# Patient Record
Sex: MALE | Race: WHITE | NOT HISPANIC OR LATINO | Employment: OTHER | URBAN - NONMETROPOLITAN AREA
[De-identification: names, ages, dates, MRNs, and addresses within clinical notes are randomized per-mention and may not be internally consistent; named-entity substitution may affect disease eponyms.]

---

## 2017-06-16 DIAGNOSIS — I10 ESSENTIAL HYPERTENSION: ICD-10-CM

## 2017-06-16 RX ORDER — METOPROLOL TARTRATE 50 MG/1
TABLET, FILM COATED ORAL
Qty: 180 TABLET | Refills: 0 | OUTPATIENT
Start: 2017-06-16

## 2017-06-17 DIAGNOSIS — I10 ESSENTIAL HYPERTENSION: ICD-10-CM

## 2017-06-19 RX ORDER — METOPROLOL TARTRATE 50 MG/1
TABLET, FILM COATED ORAL
Qty: 180 TABLET | Refills: 0 | OUTPATIENT
Start: 2017-06-19

## 2017-07-03 ENCOUNTER — APPOINTMENT (OUTPATIENT)
Dept: MRI IMAGING | Facility: HOSPITAL | Age: 52
End: 2017-07-03

## 2017-07-03 ENCOUNTER — HOSPITAL ENCOUNTER (INPATIENT)
Facility: HOSPITAL | Age: 52
LOS: 16 days | Discharge: HOME | End: 2017-07-19
Attending: EMERGENCY MEDICINE | Admitting: INTERNAL MEDICINE

## 2017-07-03 ENCOUNTER — APPOINTMENT (OUTPATIENT)
Dept: CT IMAGING | Facility: HOSPITAL | Age: 52
End: 2017-07-03

## 2017-07-03 ENCOUNTER — APPOINTMENT (OUTPATIENT)
Dept: GENERAL RADIOLOGY | Facility: HOSPITAL | Age: 52
End: 2017-07-03

## 2017-07-03 DIAGNOSIS — T78.3XXS ANGIOEDEMA, SEQUELA: ICD-10-CM

## 2017-07-03 DIAGNOSIS — R49.1 VOICE ABSENCE: ICD-10-CM

## 2017-07-03 DIAGNOSIS — T56.0X1D LEAD-INDUCED CHRONIC GOUT OF FOOT WITHOUT TOPHUS, UNSPECIFIED LATERALITY, SUBSEQUENT ENCOUNTER: ICD-10-CM

## 2017-07-03 DIAGNOSIS — R56.9 SEIZURES (HCC): ICD-10-CM

## 2017-07-03 DIAGNOSIS — R13.12 OROPHARYNGEAL DYSPHAGIA: Primary | ICD-10-CM

## 2017-07-03 DIAGNOSIS — F10.931 ALCOHOL WITHDRAWAL, WITH DELIRIUM (HCC): ICD-10-CM

## 2017-07-03 DIAGNOSIS — Z74.09 IMPAIRED MOBILITY: ICD-10-CM

## 2017-07-03 DIAGNOSIS — M1A.1790 LEAD-INDUCED CHRONIC GOUT OF FOOT WITHOUT TOPHUS, UNSPECIFIED LATERALITY, SUBSEQUENT ENCOUNTER: ICD-10-CM

## 2017-07-03 PROBLEM — T78.3XXA ANGIO-EDEMA: Status: ACTIVE | Noted: 2017-07-03

## 2017-07-03 LAB
ALBUMIN SERPL-MCNC: 4 G/DL (ref 3.5–5)
ALBUMIN/GLOB SERPL: 1.4 G/DL (ref 1.1–2.5)
ALP SERPL-CCNC: 69 U/L (ref 24–120)
ALT SERPL W P-5'-P-CCNC: 39 U/L (ref 0–54)
AMPHET+METHAMPHET UR QL: NEGATIVE
ANION GAP SERPL CALCULATED.3IONS-SCNC: 10 MMOL/L (ref 4–13)
AST SERPL-CCNC: 72 U/L (ref 7–45)
BACTERIA UR QL AUTO: ABNORMAL /HPF
BARBITURATES UR QL SCN: NEGATIVE
BASOPHILS # BLD AUTO: 0.02 10*3/MM3 (ref 0–0.2)
BASOPHILS NFR BLD AUTO: 0.2 % (ref 0–2)
BENZODIAZ UR QL SCN: NEGATIVE
BILIRUB SERPL-MCNC: 0.6 MG/DL (ref 0.1–1)
BILIRUB UR QL STRIP: NEGATIVE
BUN BLD-MCNC: 9 MG/DL (ref 5–21)
BUN/CREAT SERPL: 7.6 (ref 7–25)
CALCIUM SPEC-SCNC: 8.8 MG/DL (ref 8.4–10.4)
CANNABINOIDS SERPL QL: NEGATIVE
CHLORIDE SERPL-SCNC: 102 MMOL/L (ref 98–110)
CLARITY UR: CLEAR
CO2 SERPL-SCNC: 29 MMOL/L (ref 24–31)
COCAINE UR QL: NEGATIVE
COLOR UR: YELLOW
CREAT BLD-MCNC: 1.19 MG/DL (ref 0.5–1.4)
DEPRECATED RDW RBC AUTO: 44 FL (ref 40–54)
EOSINOPHIL # BLD AUTO: 0.03 10*3/MM3 (ref 0–0.7)
EOSINOPHIL NFR BLD AUTO: 0.3 % (ref 0–4)
ERYTHROCYTE [DISTWIDTH] IN BLOOD BY AUTOMATED COUNT: 14 % (ref 12–15)
GFR SERPL CREATININE-BSD FRML MDRD: 64 ML/MIN/1.73
GLOBULIN UR ELPH-MCNC: 2.9 GM/DL
GLUCOSE BLD-MCNC: 127 MG/DL (ref 70–100)
GLUCOSE UR STRIP-MCNC: NEGATIVE MG/DL
HCT VFR BLD AUTO: 34.1 % (ref 40–52)
HGB BLD-MCNC: 11.9 G/DL (ref 14–18)
HGB UR QL STRIP.AUTO: ABNORMAL
HYALINE CASTS UR QL AUTO: ABNORMAL /LPF
IMM GRANULOCYTES # BLD: 0.04 10*3/MM3 (ref 0–0.03)
IMM GRANULOCYTES NFR BLD: 0.3 % (ref 0–5)
KETONES UR QL STRIP: NEGATIVE
LEUKOCYTE ESTERASE UR QL STRIP.AUTO: NEGATIVE
LYMPHOCYTES # BLD AUTO: 0.99 10*3/MM3 (ref 0.72–4.86)
LYMPHOCYTES NFR BLD AUTO: 8.3 % (ref 15–45)
MCH RBC QN AUTO: 30.3 PG (ref 28–32)
MCHC RBC AUTO-ENTMCNC: 34.9 G/DL (ref 33–36)
MCV RBC AUTO: 86.8 FL (ref 82–95)
METHADONE UR QL SCN: NEGATIVE
MONOCYTES # BLD AUTO: 1.06 10*3/MM3 (ref 0.19–1.3)
MONOCYTES NFR BLD AUTO: 8.9 % (ref 4–12)
NEUTROPHILS # BLD AUTO: 9.81 10*3/MM3 (ref 1.87–8.4)
NEUTROPHILS NFR BLD AUTO: 82 % (ref 39–78)
NITRITE UR QL STRIP: NEGATIVE
OPIATES UR QL: NEGATIVE
PCP UR QL SCN: NEGATIVE
PH UR STRIP.AUTO: 7.5 [PH] (ref 5–8)
PLATELET # BLD AUTO: 245 10*3/MM3 (ref 130–400)
PMV BLD AUTO: 9.6 FL (ref 6–12)
POTASSIUM BLD-SCNC: 3 MMOL/L (ref 3.5–5.3)
PROT SERPL-MCNC: 6.9 G/DL (ref 6.3–8.7)
PROT UR QL STRIP: ABNORMAL
RBC # BLD AUTO: 3.93 10*6/MM3 (ref 4.8–5.9)
RBC # UR: ABNORMAL /HPF
REF LAB TEST METHOD: ABNORMAL
SODIUM BLD-SCNC: 141 MMOL/L (ref 135–145)
SP GR UR STRIP: 1.01 (ref 1–1.03)
SQUAMOUS #/AREA URNS HPF: ABNORMAL /HPF
UROBILINOGEN UR QL STRIP: ABNORMAL
WBC NRBC COR # BLD: 11.95 10*3/MM3 (ref 4.8–10.8)
WBC UR QL AUTO: ABNORMAL /HPF

## 2017-07-03 PROCEDURE — 80307 DRUG TEST PRSMV CHEM ANLYZR: CPT | Performed by: EMERGENCY MEDICINE

## 2017-07-03 PROCEDURE — 81001 URINALYSIS AUTO W/SCOPE: CPT | Performed by: EMERGENCY MEDICINE

## 2017-07-03 PROCEDURE — G8996 SWALLOW CURRENT STATUS: HCPCS

## 2017-07-03 PROCEDURE — G8997 SWALLOW GOAL STATUS: HCPCS

## 2017-07-03 PROCEDURE — 85025 COMPLETE CBC W/AUTO DIFF WBC: CPT | Performed by: EMERGENCY MEDICINE

## 2017-07-03 PROCEDURE — G0378 HOSPITAL OBSERVATION PER HR: HCPCS

## 2017-07-03 PROCEDURE — 99219 PR INITIAL OBSERVATION CARE/DAY 50 MINUTES: CPT | Performed by: PSYCHIATRY & NEUROLOGY

## 2017-07-03 PROCEDURE — 73030 X-RAY EXAM OF SHOULDER: CPT

## 2017-07-03 PROCEDURE — 70553 MRI BRAIN STEM W/O & W/DYE: CPT

## 2017-07-03 PROCEDURE — 99253 IP/OBS CNSLTJ NEW/EST LOW 45: CPT | Performed by: PHYSICIAN ASSISTANT

## 2017-07-03 PROCEDURE — 94799 UNLISTED PULMONARY SVC/PX: CPT

## 2017-07-03 PROCEDURE — A9577 INJ MULTIHANCE: HCPCS | Performed by: EMERGENCY MEDICINE

## 2017-07-03 PROCEDURE — 25010000002 DEXAMETHASONE PER 1 MG: Performed by: EMERGENCY MEDICINE

## 2017-07-03 PROCEDURE — 93010 ELECTROCARDIOGRAM REPORT: CPT | Performed by: INTERNAL MEDICINE

## 2017-07-03 PROCEDURE — 0 GADOBENATE DIMEGLUMINE 529 MG/ML SOLUTION: Performed by: EMERGENCY MEDICINE

## 2017-07-03 PROCEDURE — 93005 ELECTROCARDIOGRAM TRACING: CPT | Performed by: EMERGENCY MEDICINE

## 2017-07-03 PROCEDURE — 25010000003 LEVETIRACETAM IN NACL 0.75% 1000 MG/100ML SOLUTION: Performed by: PSYCHIATRY & NEUROLOGY

## 2017-07-03 PROCEDURE — 92610 EVALUATE SWALLOWING FUNCTION: CPT

## 2017-07-03 PROCEDURE — 99284 EMERGENCY DEPT VISIT MOD MDM: CPT

## 2017-07-03 PROCEDURE — 25010000002 DIPHENHYDRAMINE PER 50 MG: Performed by: EMERGENCY MEDICINE

## 2017-07-03 PROCEDURE — 80053 COMPREHEN METABOLIC PANEL: CPT | Performed by: EMERGENCY MEDICINE

## 2017-07-03 PROCEDURE — 70450 CT HEAD/BRAIN W/O DYE: CPT

## 2017-07-03 RX ORDER — AMLODIPINE BESYLATE 10 MG/1
10 TABLET ORAL
Status: DISCONTINUED | OUTPATIENT
Start: 2017-07-03 | End: 2017-07-03

## 2017-07-03 RX ORDER — FAMOTIDINE 10 MG/ML
20 INJECTION, SOLUTION INTRAVENOUS EVERY 12 HOURS SCHEDULED
Status: DISCONTINUED | OUTPATIENT
Start: 2017-07-03 | End: 2017-07-10 | Stop reason: CLARIF

## 2017-07-03 RX ORDER — HYDROCODONE BITARTRATE AND ACETAMINOPHEN 5; 325 MG/1; MG/1
1 TABLET ORAL EVERY 4 HOURS PRN
Status: DISCONTINUED | OUTPATIENT
Start: 2017-07-03 | End: 2017-07-19 | Stop reason: HOSPADM

## 2017-07-03 RX ORDER — LEVETIRACETAM 10 MG/ML
1000 INJECTION INTRAVASCULAR ONCE
Status: COMPLETED | OUTPATIENT
Start: 2017-07-03 | End: 2017-07-03

## 2017-07-03 RX ORDER — HYDROCODONE BITARTRATE AND ACETAMINOPHEN 10; 325 MG/1; MG/1
1 TABLET ORAL EVERY 4 HOURS PRN
Status: DISCONTINUED | OUTPATIENT
Start: 2017-07-03 | End: 2017-07-19 | Stop reason: HOSPADM

## 2017-07-03 RX ORDER — DEXAMETHASONE SODIUM PHOSPHATE 10 MG/ML
10 INJECTION INTRAMUSCULAR; INTRAVENOUS ONCE
Status: COMPLETED | OUTPATIENT
Start: 2017-07-03 | End: 2017-07-03

## 2017-07-03 RX ORDER — NALOXONE HCL 0.4 MG/ML
0.4 VIAL (ML) INJECTION
Status: DISCONTINUED | OUTPATIENT
Start: 2017-07-03 | End: 2017-07-05

## 2017-07-03 RX ORDER — IBUPROFEN 800 MG/1
800 TABLET ORAL EVERY 6 HOURS PRN
COMMUNITY
End: 2017-07-19 | Stop reason: HOSPADM

## 2017-07-03 RX ORDER — LORAZEPAM 2 MG/ML
2 INJECTION INTRAMUSCULAR
Status: DISCONTINUED | OUTPATIENT
Start: 2017-07-03 | End: 2017-07-04

## 2017-07-03 RX ORDER — POTASSIUM CHLORIDE 20MEQ/15ML
40 LIQUID (ML) ORAL ONCE
Status: COMPLETED | OUTPATIENT
Start: 2017-07-03 | End: 2017-07-03

## 2017-07-03 RX ORDER — FAMOTIDINE 10 MG/ML
20 INJECTION, SOLUTION INTRAVENOUS ONCE
Status: COMPLETED | OUTPATIENT
Start: 2017-07-03 | End: 2017-07-03

## 2017-07-03 RX ORDER — CLONIDINE HYDROCHLORIDE 0.1 MG/1
0.2 TABLET ORAL ONCE
Status: COMPLETED | OUTPATIENT
Start: 2017-07-03 | End: 2017-07-03

## 2017-07-03 RX ORDER — DIPHENHYDRAMINE HYDROCHLORIDE 50 MG/ML
50 INJECTION INTRAMUSCULAR; INTRAVENOUS ONCE
Status: COMPLETED | OUTPATIENT
Start: 2017-07-03 | End: 2017-07-03

## 2017-07-03 RX ORDER — SODIUM CHLORIDE 0.9 % (FLUSH) 0.9 %
1-10 SYRINGE (ML) INJECTION AS NEEDED
Status: DISCONTINUED | OUTPATIENT
Start: 2017-07-03 | End: 2017-07-19 | Stop reason: HOSPADM

## 2017-07-03 RX ORDER — NICOTINE 21 MG/24HR
1 PATCH, TRANSDERMAL 24 HOURS TRANSDERMAL EVERY 24 HOURS
Status: DISCONTINUED | OUTPATIENT
Start: 2017-07-03 | End: 2017-07-06

## 2017-07-03 RX ORDER — METOPROLOL TARTRATE 50 MG/1
50 TABLET, FILM COATED ORAL EVERY 12 HOURS
Status: DISCONTINUED | OUTPATIENT
Start: 2017-07-03 | End: 2017-07-03

## 2017-07-03 RX ORDER — METOPROLOL TARTRATE 50 MG/1
50 TABLET, FILM COATED ORAL EVERY 12 HOURS
COMMUNITY
End: 2017-07-19 | Stop reason: HOSPADM

## 2017-07-03 RX ORDER — LEVETIRACETAM 5 MG/ML
500 INJECTION INTRAVASCULAR EVERY 12 HOURS SCHEDULED
Status: DISCONTINUED | OUTPATIENT
Start: 2017-07-04 | End: 2017-07-07

## 2017-07-03 RX ORDER — ONDANSETRON 2 MG/ML
4 INJECTION INTRAMUSCULAR; INTRAVENOUS EVERY 6 HOURS PRN
Status: DISCONTINUED | OUTPATIENT
Start: 2017-07-03 | End: 2017-07-19 | Stop reason: HOSPADM

## 2017-07-03 RX ORDER — ACETAMINOPHEN 325 MG/1
650 TABLET ORAL EVERY 4 HOURS PRN
Status: DISCONTINUED | OUTPATIENT
Start: 2017-07-03 | End: 2017-07-19 | Stop reason: HOSPADM

## 2017-07-03 RX ORDER — DEXAMETHASONE SODIUM PHOSPHATE 4 MG/ML
4 INJECTION, SOLUTION INTRA-ARTICULAR; INTRALESIONAL; INTRAMUSCULAR; INTRAVENOUS; SOFT TISSUE EVERY 6 HOURS
Status: DISCONTINUED | OUTPATIENT
Start: 2017-07-03 | End: 2017-07-03

## 2017-07-03 RX ORDER — CLONIDINE HYDROCHLORIDE 0.1 MG/1
0.1 TABLET ORAL NIGHTLY
Status: DISCONTINUED | OUTPATIENT
Start: 2017-07-03 | End: 2017-07-03

## 2017-07-03 RX ADMIN — DIPHENHYDRAMINE HYDROCHLORIDE 50 MG: 50 INJECTION, SOLUTION INTRAMUSCULAR; INTRAVENOUS at 08:09

## 2017-07-03 RX ADMIN — CLONIDINE HYDROCHLORIDE 0.2 MG: 0.1 TABLET ORAL at 08:32

## 2017-07-03 RX ADMIN — POTASSIUM CHLORIDE 40 MEQ: 20 SOLUTION ORAL at 17:59

## 2017-07-03 RX ADMIN — NICARDIPINE HYDROCHLORIDE 5 MG/HR: 25 INJECTION INTRAVENOUS at 16:02

## 2017-07-03 RX ADMIN — FAMOTIDINE 20 MG: 10 INJECTION INTRAVENOUS at 22:11

## 2017-07-03 RX ADMIN — GADOBENATE DIMEGLUMINE 19 ML: 529 INJECTION, SOLUTION INTRAVENOUS at 12:19

## 2017-07-03 RX ADMIN — DEXAMETHASONE SODIUM PHOSPHATE 10 MG: 10 INJECTION, SOLUTION INTRAMUSCULAR; INTRAVENOUS at 08:09

## 2017-07-03 RX ADMIN — FAMOTIDINE 20 MG: 10 INJECTION, SOLUTION INTRAVENOUS at 08:09

## 2017-07-03 RX ADMIN — LEVETIRACETAM 1000 MG: 1000 INJECTION, SOLUTION INTRAVENOUS at 19:06

## 2017-07-03 RX ADMIN — NICOTINE 1 PATCH: 14 PATCH, EXTENDED RELEASE TRANSDERMAL at 17:58

## 2017-07-03 RX ADMIN — DEXAMETHASONE SODIUM PHOSPHATE 10 MG: 10 INJECTION, SOLUTION INTRAMUSCULAR; INTRAVENOUS at 14:06

## 2017-07-03 RX ADMIN — ACETAMINOPHEN 650 MG: 325 TABLET, FILM COATED ORAL at 20:10

## 2017-07-03 RX ADMIN — HYDROCODONE BITARTRATE AND ACETAMINOPHEN 1 TABLET: 10; 325 TABLET ORAL at 17:58

## 2017-07-03 RX ADMIN — NICARDIPINE HYDROCHLORIDE 7.5 MG/HR: 25 INJECTION INTRAVENOUS at 20:00

## 2017-07-03 NOTE — CONSULTS
Patient Care Team:  Linda Hoffman DNP, EPI as PCP - General  Linda Hoffman DNP, APRN as PCP - Family Medicine    Chief Complaint   Patient presents with   • Angioedema   • Seizures        Subjective     Nishant Savage is a 52 y.o. male who presents for evaluation.  History of Present Illness  Patient consult due to tongue swelling and biting tongue during a likely seizure this morning. The patient states that he was taking lisinopril and this morning went to talk to his father about his tongue feeling swollen and thick. During that time he blacked out and his father told EMS he was shaking throughout his body. Patient was found to have PRES on his MRI and likely had convulsive syncope due to an episode of severe hypertension.     Review of Systems  Review of Systems   Constitutional: Negative for activity change, appetite change, chills, diaphoresis, fatigue, fever and unexpected weight change.   HENT: Negative for congestion, ear discharge, ear pain, facial swelling, hearing loss, mouth sores, nosebleeds, postnasal drip, rhinorrhea, sinus pressure, sneezing, sore throat, tinnitus, trouble swallowing and voice change.         Tongue swelling, pain   Eyes: Negative for pain, discharge, redness, itching and visual disturbance.   Respiratory: Negative for apnea, cough, choking, chest tightness, shortness of breath, wheezing and stridor.    Gastrointestinal: Negative for nausea and vomiting.   Endocrine: Negative for cold intolerance and heat intolerance.   Musculoskeletal: Negative for arthralgias, back pain, gait problem, neck pain and neck stiffness.   Skin: Negative for rash.   Allergic/Immunologic: Negative for environmental allergies and food allergies.   Neurological: Negative for dizziness, tremors, seizures, syncope, facial asymmetry, speech difficulty, weakness, light-headedness, numbness and headaches.   Hematological: Negative for adenopathy. Does not bruise/bleed easily.      Psychiatric/Behavioral: Negative for behavioral problems, sleep disturbance and suicidal ideas. The patient is not nervous/anxious and is not hyperactive.        History  Past Medical History:   Diagnosis Date   • Anxiety    • Arthritis    • Gout    • HTN (hypertension)    • Hyperlipidemia      History reviewed. No pertinent surgical history.  Family History   Problem Relation Age of Onset   • Hypertension Mother    • Diabetes Mother    • Heart disease Father    • Hypertension Father    • No Known Problems Daughter    • No Known Problems Son    • Diabetes Maternal Grandmother    • No Known Problems Maternal Grandfather    • No Known Problems Paternal Grandmother    • Heart attack Paternal Grandfather    • Kidney disease Sister      Social History   Substance Use Topics   • Smoking status: Current Every Day Smoker     Packs/day: 0.33     Years: 30.00     Types: Cigarettes   • Smokeless tobacco: None   • Alcohol use 1.2 oz/week     2 Cans of beer per week      Comment: PER DAY FOR 30 YEARS       Current Facility-Administered Medications:   •  acetaminophen (TYLENOL) tablet 650 mg, 650 mg, Oral, Q4H PRN, Jairon Perez, DO  •  amLODIPine (NORVASC) tablet 10 mg, 10 mg, Oral, Q24H, Jairon Perez, DO  •  CloNIDine (CATAPRES) tablet 0.1 mg, 0.1 mg, Oral, Nightly, Jairon Perez DO  •  dexamethasone (DECADRON) injection 4 mg, 4 mg, Intravenous, Q6H, Jairon Perez DO  •  famotidine (PEPCID) injection 20 mg, 20 mg, Intravenous, Q12H, Jairon Perez DO  •  HYDROcodone-acetaminophen (NORCO)  MG per tablet 1 tablet, 1 tablet, Oral, Q4H PRN, Jairon Perez DO  •  HYDROcodone-acetaminophen (NORCO) 5-325 MG per tablet 1 tablet, 1 tablet, Oral, Q4H PRN, Jairon Perez DO  •  HYDROmorphone (DILAUDID) injection 0.5 mg, 0.5 mg, Intravenous, Q3H PRN **AND** naloxone (NARCAN) injection 0.4 mg, 0.4 mg, Intravenous, Q5 Min PRN, Jairon Perez DO  •  metoprolol tartrate (LOPRESSOR) tablet 50 mg, 50 mg, Oral, Q12H, Jairon ADAMS  DO Ana  •  niCARdipine (CARDENE) 25 mg/250 mL (0.1 mg/mL) 0.9% NS infusion, 5-15 mg/hr, Intravenous, Titrated, Jairon Perez DO, Last Rate: 75 mL/hr at 07/03/17 1645, 7.5 mg/hr at 07/03/17 1645  •  nicotine (NICODERM CQ) 14 MG/24HR patch 1 patch, 1 patch, Transdermal, Q24H, Jairon Perez DO  •  ondansetron (ZOFRAN) injection 4 mg, 4 mg, Intravenous, Q6H PRN, Jairon Perez DO  •  potassium chloride (KAYCIEL) 20 MEQ/15ML (10%) solution 40 mEq, 40 mEq, Oral, Once, Jairon Perez DO  •  sodium chloride 0.9 % flush 1-10 mL, 1-10 mL, Intravenous, PRN, Jairon Perez DO  Allergies:  Lipitor [atorvastatin]    Objective     Vital Signs  Temp:  [98 °F (36.7 °C)] 98 °F (36.7 °C)  Heart Rate:  [62-97] 62  Resp:  [18] 18  BP: (162-207)/() 177/93    Physical Exam:  Physical Exam   Constitutional: He is oriented to person, place, and time. He appears well-developed and well-nourished. No distress.   HENT:   Head: Normocephalic and atraumatic.   Right Ear: External ear normal.   Left Ear: External ear normal.   Nose: Nose normal.   Mouth/Throat: Uvula is midline and oropharynx is clear and moist.       Eyes: Conjunctivae and EOM are normal. Pupils are equal, round, and reactive to light. Right eye exhibits no discharge. Left eye exhibits no discharge. No scleral icterus.   Cardiovascular: Normal rate.    Pulmonary/Chest: Effort normal. No respiratory distress.   Neurological: He is alert and oriented to person, place, and time.   Skin: Skin is warm and dry. He is not diaphoretic.   Psychiatric: He has a normal mood and affect. His behavior is normal. Judgment and thought content normal.   Vitals reviewed.      Results Review:   I reviewed the patient's new clinical results.    Assessment/Plan     Problems Addressed this Visit     None      Visit Diagnoses     Oropharyngeal dysphagia    -  Primary    Angioedema, sequela              My findings and recommendations were discussed and questions were answered.  Continue care as directed by Neurology and Medicine. ENT will follow as needed. Tongue swelling and bruising will likely resolve. If swelling worsens or causes airway compromise patient may need trach. Due to PRES will hold steroids.    YEVGENIY [place]  07/03/17  5:37 PM

## 2017-07-03 NOTE — THERAPY EVALUATION
Acute Care - Speech Language Pathology   Swallow Initial Evaluation [facility]     Patient Name: Nishant Savage  : 1965  MRN: [id number]  Today's Date: 7/3/2017        Referring Physician: Dr. Zamarripa      Admit Date: 7/3/2017    SPEECH-LANGUAGE PATHOLOGY EVALUATION - SWALLOW  Subjective: The patient was seen on this date for a Clinical Swallow evaluation.  Patient was alert and cooperative.    Objective: Textures given included thin liquid and puree consistency.  Assessment: Difficulties were noted with thin liquid and puree consistency. Pt completed trials of pureed and thin liquid consistencies. Limited trials were proived due to lingual pain and swelling which compromised the pt's ability to manipulate the bolus. With trials presented the pt was noted to have 1-2 multiple trials per swallow as well as mild oral residue post pureed trials. However, the pt was able to clear residue with a thin liquid wash. The pt's vocal quality remained unchanged throughout PO trials. No overt s/s of aspiration were noted.   SLP Findings:  Patient presents with moderate oropharyngeal dysphagia, without esophageal component.   Recommendations: Diet Textures: thin liquid, puree consistency food.  Medications should be taken whole or crushed with thin liquids. May have water and ice between meals after oral care, under staff or family supervision and with the recommended strategies for safe swallowing.   Recommended Strategies: Upright for PO and small bites and sips. Oral care before breakfast, after all meals and PRN.   Dysphagia therapy is recommended.   Kevin Zacarias MS CCC-SLP 7/3/2017 10:33 AM  Visit Dx:     ICD-10-CM ICD-9-CM   1. Oropharyngeal dysphagia R13.12 787.22     Patient Active Problem List   Diagnosis   • Hyperlipidemia   • HTN (hypertension)   • Gout   • Anxiety   • Arthritis   • Erectile dysfunction     Past Medical History:   Diagnosis Date   • Anxiety    • Arthritis    • Gout    • HTN (hypertension)    •  Hyperlipidemia      History reviewed. No pertinent surgical history.       SWALLOW EVALUATION (last 72 hours)      Swallow Evaluation       07/03/17 0930                Rehab Evaluation    Document Type evaluation  -CS        Subjective Information no complaints;agree to therapy  -CS        General Information    Patient Profile Review yes  -CS        Onset of Illness/Injury 07/03/17  -CS        Referring Physician Dr. Zamarripa  -        Current Diet Limitations NPO  -CS        Precautions/Limitations, Vision WFL  -CS        Precautions/Limitations, Hearing WFL  -CS        Prior Level of Function- Communication functional in all spheres  -CS        Prior Level of Function- Swallowing no diet consistency restrictions  -CS        Plans/Goals Discussed With patient  -CS        Barriers to Rehab none identified  -CS        Clinical Impression    Patient's Goals For Discharge patient did not state  -CS        Family Goals For Discharge family did not state  -CS        SLP Swallowing Diagnosis moderate dysphagia  -CS        Rehab Potential/Prognosis, Swallowing good, to achieve stated therapy goals  -CS        Criteria for Skilled Therapeutic Interventions Met skilled criteria for dysphagia intervention met  -CS        FCM, Swallowing 4-->Level 4  -CS        Therapy Frequency 3-5 times/wk  -CS        Predicted Duration Therapy Interv (days) until discharge  -CS        Expected Duration Therapy Session (min) 15-30 minutes  -CS        SLP Diet Recommendation II - pureed;thin liquids  -CS        SLP Rec. for Method of Medication Administration meds whole with thin liquid;meds whole in pudding/applesauce  -CS        Monitor For Signs Of Aspiration cough;gurgly voice;throat clearing  -CS        Anticipated Discharge Disposition home  -CS        Pain Assessment    Pain Assessment 0-10  -CS        Pain Score 5  -CS        Oral Motor Structure and Function    Oral Lesions or Structural Abnormalities other (see comments)   Swollen  tongue  -CS        Dentition Assessment present and adequate  -CS        Secretion Management WNL/WFL  -CS        Mucosal Quality moist, healthy  -CS        Velar Elevation WFL (within functional limits)  -CS        Volitional Swallow no difficulties initiating volitional swallow  -CS        Volitional Cough no difficulties initiating volitional cough  -CS        Oral Musculature General Assessment lingual impairment  -CS        Lingual Strength and Mobility reduced ROM  -CS        General Feeding/Swallowing Observations    Current Feeding Method NPO  -CS        Respiratory Support Currently in Use nasal cannula in use  -CS        Observations of Posture During Feeding Upright in bed  -CS        Clinical Swallow Exam    Mode of Presentation fed by clinician;spoon;straw  -CS        Oral Residue pudding:;tongue residue  -CS        Oral Phase Results impaired oral phase, signs of dysfunction present  -CS        Pharyngeal Phase Results multiple swallows  -CS        Summary of Clinical Exam Pt completed trials of pureed and thin liquid consistencies. Limited trials were proived due to lingual pain and swelling which compromised the pt's ability to manipulate the bolus. With trials presented the pt was noted to have 1-2 multiple trials per swallow as well as mild oral residue post pureed trials. However, the pt was able to clear residue with a thin liquid wash. The pt's vocal quality remained unchanged throughout PO trials. No overt s/s of aspiration were noted.   -CS        Swallow Recommendations    Eating Assistance needs occasional supervision during self eating activity  -CS        Oral Care oral care with toothbrush and dentifrice BID and PRN  -CS        Modified Eating Strategies upright positioning 90 degrees;alternate food and liquid swallows  -CS        Other Recommendations puree;thin;meds whole;meds crushed and mixed  -CS        Recommended Diet II - pureed;thin liquids  -CS          User Key  (r) = Recorded  By, (t) = Taken By, (c) = Cosigned By    Initials Name Effective Dates    CS Kevin Zacarias MS [facility]-SLP 06/28/17 -         EDUCATION  The patient has been educated in the following areas:   Dysphagia (Swallowing Impairment).    SLP Recommendation and Plan  SLP Swallowing Diagnosis: moderate dysphagia  SLP Diet Recommendation: II - pureed, thin liquids     SLP Rec. for Method of Medication Administration: meds whole with thin liquid, meds whole in pudding/applesauce  Monitor For Signs Of Aspiration: cough, gurgly voice, throat clearing     Criteria for Skilled Therapeutic Interventions Met: skilled criteria for dysphagia intervention met  Anticipated Discharge Disposition: home  Rehab Potential/Prognosis, Swallowing: good, to achieve stated therapy goals  Therapy Frequency: 3-5 times/wk             Plan of Care Review  Plan Of Care Reviewed With: patient  Progress: no change (Initial evaluation.)  Outcome Summary/Follow up Plan: CBSE completed. ST recommends a pureed diet with thin liquids. Meds ok to be completed whole with thin liquids. If pt exhibits difficulty with pureed foods a liquid diet may be beneficial at this time. ST will follow.          IP SLP Goals       07/03/17 1027          Safely Consume Diet    Safely Consume Diet- SLP, Date Established 07/03/17  -CS      Safely Consume Diet- SLP, Time to Achieve by discharge  -CS      Safely Consume Diet- SLP, Additional Goal Pt will tolerate recommended diet as well as trials of upgraded diet consistencies w/o any overt s/s of aspiration.  -CS      Safely Consume Diet- SLP, Outcome goal ongoing  -CS        User Key  (r) = Recorded By, (t) = Taken By, (c) = Cosigned By    Initials Name Provider Type    CS Kevin Zacarias MS CCC-SLP Speech and Language Pathologist             SLP Outcome Measures (last 72 hours)      SLP Outcome Measures       07/03/17 1000          SLP Outcome Measures    Outcome Measure Used? Adult NOMS  -CS      OTHER    SLP Diagnoses Dysphagia   -CS      FCM Scores    FCM Chosen Swallowing  -CS      Swallowing FCM Score 4  -CS        User Key  (r) = Recorded By, (t) = Taken By, (c) = Cosigned By    Initials Name Effective Dates     Kevin Zacarias MS CCC-SLP 06/28/17 -            Time Calculation:         Time Calculation- SLP       07/03/17 1032          Time Calculation- SLP    SLP Start Time 0930  -CS      SLP Stop Time 1032  -CS      SLP Time Calculation (min) 62 min  -CS      SLP Received On 07/03/17  -CS      SLP Goal Re-Cert Due Date 07/13/17  -CS        User Key  (r) = Recorded By, (t) = Taken By, (c) = Cosigned By    Initials Name Provider Type    OMAYRA Zacarias MS CCC-SLP Speech and Language Pathologist          Therapy Charges for Today     Code Description Service Date Service Provider Modifiers Qty    06632912607 HC ST SWALLOWING CURRENT STATUS 7/3/2017 Kevin Zacarias MS CCC-SLP GN, CK 1    66897180799 HC ST SWALLOWING PROJECTED 7/3/2017 Kevin Zacarias MS CCC-ALEXIA GN, CI 1    [id number] HC ST EVAL ORAL PHARYNG SWALLOW 4 7/3/2017 Kevin Zacarias MS CCC-SLP GN 1          SLP G-Codes  SLP NOMS Used?: Yes  Functional Limitations: Swallowing  Swallow Current Status (): At least 40 percent but less than 60 percent impaired, limited or restricted  Swallow Goal Status (): At least 1 percent but less than 20 percent impaired, limited or restricted    Kevin Zacarias MS CCC-SLP  7/3/2017

## 2017-07-03 NOTE — PLAN OF CARE
Problem: Patient Care Overview (Adult)  Goal: Plan of Care Review  Outcome: Ongoing (interventions implemented as appropriate)    07/03/17 1024   Coping/Psychosocial Response Interventions   Plan Of Care Reviewed With patient   Patient Care Overview   Progress no change  (Initial evaluation.)   Outcome Evaluation   Outcome Summary/Follow up Plan CBSE completed. ST recommends a pureed diet with thin liquids. Meds ok to be completed whole with thin liquids. If pt exhibits difficulty with pureed foods a liquid diet may be beneficial at this time. ST will follow.

## 2017-07-03 NOTE — H&P
"    [facility]  HISTORY AND PHYSICAL    Date of Admission: 7/3/2017  Primary Care Physician: Linda Hoffman, DNP, APRN    Subjective     Chief Complaint: tongue swelling preceded possible seizure activity    History of Present Illness  This is a 52-year-old  gentleman who resides at home in [place], [place].  He sees Linda Hoffman for primary care.  He has a long-standing history of hypertension.  He tells me that this morning he went to his father's home to discuss with him the fact that his tongue had been swelling and feeling thick since early this morning.  He tells me that he was speaking with his father and ultimately the next thing that he remembers was having tunnel vision and waking up with EMS there.  His father told EMS that the patient had \"diffuse shaking throughout his body.\"  The patient tells me that he has never had a seizure.  There is no family history of seizure.  He apparently had taken his blood pressure while at his father's home and he had a 212 systolic reading.    His blood pressures typically well-controlled.  He tells me that he has been on lisinopril for \"years.\"  He has never had any problems with tongue swelling with this.  He says that his speech had become thick and he was having difficulty with swallowing.  He ultimately also bit his tongue in the ensuing convulsive episode.  This only complicated matters.  He was given IV Decadron in the emergency department.  He has been referred for admission secondary to escalated hypertension, tongue swelling, and possible new onset seizure.  However, he has been found to have PRES on his MRI and likely had convulsive syncope after an episode of severe hypertension.    Review of Systems   Constitutional: Negative.    HENT: Positive for drooling. Negative for congestion, facial swelling, sinus pressure, sore throat and trouble swallowing.    Respiratory: Positive for apnea (he has a " history of obstructive sleep apnea, but is noncompliant with his device.). Negative for cough, choking, chest tightness, shortness of breath, wheezing and stridor.    Cardiovascular: Positive for leg swelling. Negative for chest pain and palpitations.   Gastrointestinal: Negative.    Genitourinary: Negative.    Musculoskeletal: Negative.    Skin: Negative.    Neurological: Positive for seizures, syncope (likely) and speech difficulty (because of his tongue issue). Negative for dizziness, tremors, weakness, light-headedness and headaches.   Psychiatric/Behavioral: Negative.      Otherwise complete ROS reviewed and negative except as mentioned in the HPI.    Past Medical History:   Past Medical History:   Diagnosis Date   • Anxiety    • Arthritis    • Gout    • HTN (hypertension)    • Hyperlipidemia      Past Surgical History:History reviewed. No pertinent surgical history.    Social History:  reports that he has been smoking Cigarettes.  He has a 9.90 pack-year smoking history. He does not have any smokeless tobacco history on file. He reports that he drinks about 1.2 oz of alcohol per week  He reports that he does not use illicit drugs.    Family History: family history includes Diabetes in his maternal grandmother and mother; Heart attack in his paternal grandfather; Heart disease in his father; Hypertension in his father and mother; Kidney disease in his sister; No Known Problems in his daughter, maternal grandfather, paternal grandmother, and son.       Allergies:  Allergies   Allergen Reactions   • Lipitor [Atorvastatin] Rash       Medications:  Prior to Admission medications    Medication Sig Start Date End Date Taking? Authorizing Provider   cloNIDine (CATAPRES) 0.1 MG tablet Take 0.1 mg by mouth every night.   Yes Historical Provider, MD   fenofibrate (TRICOR) 145 MG tablet Take 1 tablet by mouth Every Night. 9/30/16  Yes Linda Hoffman, SUSHMA, APRN   ibuprofen (ADVIL,MOTRIN) 800 MG tablet Take 800 mg by  "mouth Every 6 (Six) Hours As Needed for Moderate Pain (4-6).   Yes Historical Provider, MD   indomethacin (INDOCIN) 50 MG capsule Take 1 capsule by mouth 3 (Three) Times a Day As Needed for mild pain (1-3).  Patient taking differently: Take 50 mg by mouth 3 (Three) Times a Day As Needed for Mild Pain (1-3) (gout). 9/30/16  Yes Linda Hoffman DNP, APRN   lisinopril-hydrochlorothiazide (PRINZIDE,ZESTORETIC) 20-25 MG per tablet Take 1 tablet by mouth Daily. 9/30/16  Yes Linda Hoffman DNP, APRN   metoprolol tartrate (LOPRESSOR) 50 MG tablet Take 50 mg by mouth Every 12 (Twelve) Hours.   Yes Historical Provider, MD   metoprolol tartrate (LOPRESSOR) 50 MG tablet Take 1 tablet by mouth 2 (Two) Times a Day. 9/30/16 7/3/17 Yes Linda Hoffman DNP, APRN   vardenafil (LEVITRA) 10 MG tablet Take 1 tablet by mouth Daily As Needed for erectile dysfunction. 9/30/16   Linda Hoffman DNP, APRN   allopurinol (ZYLOPRIM) 300 MG tablet Take 1 tablet by mouth Daily. prn 9/30/16 7/3/17  Linda Hoffman DNP, APRN   ibuprofen (ADVIL,MOTRIN) 800 MG tablet Take 1 tablet by mouth Every 6 (Six) Hours As Needed for mild pain (1-3) or moderate pain (4-6).  Patient taking differently: Take 800 mg by mouth Every 6 (Six) Hours As Needed for Moderate Pain (4-6). 9/30/16 7/3/17  Linda Hoffman DNP, APRN       Objective     Vital Signs: /93  Pulse 62  Temp 98 °F (36.7 °C)  Resp 18  Ht 68\" (172.7 cm)  Wt 200 lb (90.7 kg)  SpO2 98%  BMI 30.41 kg/m2  Physical Exam   Constitutional: He is oriented to person, place, and time. He appears well-developed and well-nourished.   HENT:   Head: Normocephalic and atraumatic.   Significant bruising to his tongue as well as a sublingual hematoma.  He does not appear to have any trouble handling secretions at this point in time.   Eyes: Conjunctivae and EOM are normal. Pupils are equal, round, and reactive to light.   Neck: Neck supple. No JVD present. No tracheal deviation present. " No thyromegaly present.   Cardiovascular: Normal rate, regular rhythm, normal heart sounds and intact distal pulses.  Exam reveals no gallop and no friction rub.    No murmur heard.  Pulmonary/Chest: Effort normal and breath sounds normal. No stridor. No respiratory distress. He has no wheezes. He has no rales. He exhibits no tenderness.   Abdominal: Soft. Bowel sounds are normal. He exhibits no distension. There is no tenderness. There is no rebound and no guarding.   Musculoskeletal: Normal range of motion. He exhibits edema (1+). He exhibits no tenderness or deformity.   Lymphadenopathy:     He has no cervical adenopathy.   Neurological: He is alert and oriented to person, place, and time. He displays normal reflexes. No cranial nerve deficit. He exhibits normal muscle tone.   Skin: Skin is warm and dry. No rash noted.   Psychiatric: He has a normal mood and affect. His behavior is normal. Judgment and thought content normal.     Results Reviewed:  Lab Results (last 24 hours)     Procedure Component Value Units Date/Time    CBC & Differential [[id number]] Collected:  07/03/17 0807    Specimen:  Blood Updated:  07/03/17 0832    Narrative:       The following orders were created for panel order CBC & Differential.  Procedure                               Abnormality         Status                     ---------                               -----------         ------                     CBC Auto Differential[[id number]]        Abnormal            Final result                 Please view results for these tests on the individual orders.    CBC Auto Differential [[id number]]  (Abnormal) Collected:  07/03/17 0807    Specimen:  Blood Updated:  07/03/17 0832     WBC 11.95 (H) 10*3/mm3      RBC 3.93 (L) 10*6/mm3      Hemoglobin 11.9 (L) g/dL      Hematocrit 34.1 (L) %      MCV 86.8 fL      MCH 30.3 pg      MCHC 34.9 g/dL      RDW 14.0 %      RDW-SD 44.0 fl      MPV 9.6 fL      Platelets 245 10*3/mm3      Neutrophil % 82.0 (H)  %      Lymphocyte % 8.3 (L) %      Monocyte % 8.9 %      Eosinophil % 0.3 %      Basophil % 0.2 %      Immature Grans % 0.3 %      Neutrophils, Absolute 9.81 (H) 10*3/mm3      Lymphocytes, Absolute 0.99 10*3/mm3      Monocytes, Absolute 1.06 10*3/mm3      Eosinophils, Absolute 0.03 10*3/mm3      Basophils, Absolute 0.02 10*3/mm3      Immature Grans, Absolute 0.04 (H) 10*3/mm3     Comprehensive Metabolic Panel [[id number]]  (Abnormal) Collected:  07/03/17 0807    Specimen:  Blood Updated:  07/03/17 0841     Glucose 127 (H) mg/dL      BUN 9 mg/dL      Creatinine 1.19 mg/dL      Sodium 141 mmol/L      Potassium 3.0 (L) mmol/L      Chloride 102 mmol/L      CO2 29.0 mmol/L      Calcium 8.8 mg/dL      Total Protein 6.9 g/dL      Albumin 4.00 g/dL      ALT (SGPT) 39 U/L      AST (SGOT) 72 (H) U/L      Alkaline Phosphatase 69 U/L      Total Bilirubin 0.6 mg/dL      eGFR Non African Amer 64 mL/min/1.73      Globulin 2.9 gm/dL      A/G Ratio 1.4 g/dL      BUN/Creatinine Ratio 7.6     Anion Gap 10.0 mmol/L     Urinalysis With / Culture If Indicated [[id number]]  (Abnormal) Collected:  07/03/17 0834    Specimen:  Urine from Urine, Clean Catch Updated:  07/03/17 0858     Color, UA Yellow     Appearance, UA Clear     pH, UA 7.5     Specific Gravity, UA 1.014     Glucose, UA Negative     Ketones, UA Negative     Bilirubin, UA Negative     Blood, UA Small (1+) (A)     Protein, UA 30 mg/dL (1+) (A)     Leuk Esterase, UA Negative     Nitrite, UA Negative     Urobilinogen, UA 0.2 E.U./dL    Urinalysis, Microscopic Only [[id number]]  (Abnormal) Collected:  07/03/17 0834    Specimen:  Urine from Urine, Clean Catch Updated:  07/03/17 0858     RBC, UA 3-5 (A) /HPF      WBC, UA 0-2 (A) /HPF      Bacteria, UA None Seen /HPF      Squamous Epithelial Cells, UA 0-2 /HPF      Hyaline Casts, UA None Seen /LPF      Methodology Automated Microscopy    Urine Drug Screen [[id number]]  (Normal) Collected:  07/03/17 0834    Specimen:  Urine from Urine, Clean  Catch Updated:  07/03/17 0930     Amphetamine Screen, Urine Negative     Barbiturates Screen, Urine Negative     Benzodiazepine Screen, Urine Negative     Cocaine Screen, Urine Negative     Methadone Screen, Urine Negative     Opiate Screen Negative     Phencyclidine (PCP), Urine Negative     THC, Screen, Urine Negative    Narrative:       Negative Thresholds For Drugs Screened in Urine:    Amphetamines          500 ng/ml  Barbiturates          200 ng/ml  Benzodiazepines       200 ng/ml  Cocaine               150 ng/ml  Methadone             150 ng/ml  Opiates               300 ng/ml  Phencyclidine         25 ng/ml  THC                      50 ng/ml    The normal value for all drugs tested is negative. This report includes final unconfirmed screening results.  A positive result by this assay can be, at your request, sent to the Reference Lab for confirmation by gas chromatography. Unconfirmed results must not be used for non-medical purposes, such as employment or legal testing. Clinical consideration should be applied to any drug of abuse test result, particularly when unconfirmed results are used.        Imaging Results (last 24 hours)     Procedure Component Value Units Date/Time    XR Shoulder 2+ View Right [[id number]] Collected:  07/03/17 0913     Updated:  07/03/17 0918    Narrative:       EXAMINATION: XR SHOULDER 2+ VW RIGHT-     7/3/2017 10:02 AM EDT     HISTORY: Right shoulder pain.     Right shoulder, 3 views.     Old healed fracture of the right clavicle at the junction of the mid and  distal one third.  Bony hypertrophy of the distal clavicle with narrowing of the outlet and  increased risk for impingement.  No AC joint separation.     Mild humeral head spurring.     High riding humeral head with the appearance of rotator cuff  insufficiency.     Summary:  1. Bony narrowing of the outlet based on the clavicle.  2. High riding humeral head.  3. MRI correlation recommended to assess the rotator cuff  integrity..  This report was finalized on 07/03/2017 09:15 by Dr. Camilo Almodovar MD.    CT Head Without Contrast [[id number]] Collected:  07/03/17 1114     Updated:  07/03/17 1123    Narrative:       EXAMINATION: CT HEAD WO CONTRAST-      7/3/2017 9:43 AM EDT     HISTORY: seizures     In order to have a CT radiation dose as low as reasonably achievable  Automated Exposure Control was utilized for adjustment of the mA and/or  KV according to patient size.     DLP in mGycm= 821.     Ill-defined low density within both occipital lobes. No mass effect. No  acute hemorrhage.     Normal ventricle size.     No skull fracture is seen.  Clear paranasal sinuses.     Summary:  1. Bilateral occipital lobe hypodensity compatible with subacute  ischemic change. MRI correlation recommended.  2. Report called to Dr. Zamarripa in the emergency room at 11:15 AM.                                   This report was finalized on 07/03/2017 11:20 by Dr. Camilo Almodovar MD.    MRI Brain With & Without Contrast [[id number]] Collected:  07/03/17 1238     Updated:  07/03/17 1306    Narrative:       MRI BRAIN W WO CONTRAST- 7/3/2017 11:57 AM CDT     HISTORY: Ischemic changes; R13.12-Dysphagia, oropharyngeal phase     COMPARISON: None.       TECHNIQUE: Multiplanar imaging of the brain was performed in a routine  fashion before and after the intravenous injection of gadolinium  contrast.     FINDINGS:   Diffusion: No restriction of diffusion to suggest acute ischemia.     Midline structures: Nondisplaced.     Ventricles: Normal in configuration and symmetric in size.     Masses: No masses or mass effect.     Basilar cisterns: Maintained.     Extra axial space: No abnormal extra-axial fluid.     Gray-white matter signal: There is abnormal signal. There is bilateral  subcortical signal on the T2 and FLAIR sequences involving the  cerebellum and posterior occipital and parietal lobes. There is no  associated enhancement. Most likely this is posterior  reversible  encephalopathy syndrome. (P RES)     Cerebellum: Abnormal signal is noted in the cerebellum most likely  associated with the pres.     Brainstem: Normal.     Enhancement: No abnormal enhancement.     Other: Proximal cervical spinal cord is normal. Bilateral globes and  orbits are normal in appearance. Normal cerebrovascular flow voids  noted. Mucous cyst is noted in the right maxillary antrum mucosal  thickening is present in the left maxillary antrum       Impression:       1. Posterior reversible encephalopathy syndrome. Follow-up in 8 weeks is  suggested.         This report was finalized on 07/03/2017 12:53 by Dr. Adolph Echols MD.        I have personally reviewed and interpreted the radiology studies and ECG obtained at time of admission.     Assessment / Plan     Assessment & Plan   1.  Possible angioedema related to lisinopril.  2.  Escalated hypertension.  3.  Possible new onset seizure disorder versus convulsive syncope.  4.  Posterior reversible encephalopathy syndrome on MRI.  5.  Tongue bite with sublingual hematoma.  6.  Tobacco abuse.  7.  Obstructive sleep apnea, noncompliant with device.  8.  Gouty arthritis.    He will be admitted to my service at [facility].  He is placed in the cardiac care unit to follow his blood pressure and also be placed on a Cardene drip.  I will escalate his antihypertensive regimen and obviously hold his lisinopril.  Also wanted to do a 2-D echocardiogram given his edema and difficult to control hypertension.    Consult neurology to evaluate the finding of PRES on his MRI.  I doubt very seriously that he had a true seizure today.  He likely had an episode related to this finding or also possibly convulsive syncope.  I doubt he will need antiepileptic drugs.  I will leave whether or not he needs an EEG to the neurologist.    Consult otolaryngology to evaluate his tongue hematoma and sublingual hematoma.  Hopefully this issue will not worsen.  He  also apparently had angioedema that preceded this issue.  Remain on IV Decadron and Pepcid.  Hold his nonsteroidal anti-inflammatory drugs for now as well.  Pain control with oral Norco or IV Dilaudid.    SCDs for DVT prophylaxis.    Code Status: Full.      I discussed the patients findings and my recommendations with the patient, his daughter, and his nurse, Ginny.     Estimated length of stay is at least overnight.     Jairon Perez DO   07/03/17   3:39 PM

## 2017-07-03 NOTE — ED NOTES
Pt resting.  No improvement in tongue swelling.  No diff swallowing or  Tolerating secretions. C/O of feeling flushed.  Requesting fan.         Aurea Laboy RN  07/03/17 4668

## 2017-07-03 NOTE — ED PROVIDER NOTES
Subjective   HPI Comments: 52-year-old gentleman presents for our facility via ambulance with a complaint of a seizure and swelling of the tongue.    Yesterday he went to sleep at about 9 o'clock he slept well woke up this morning and HIS tongue was swollen at that point he went to his dad's house drove and later he had a sensation of vomiting stars and seeing colors and lights and subsequently he recalls seeing the EMS personnel.    History is collaborated from the EMS who states that he had an aura type of experience followed by seizure-like activity which was witnessed by the dad and went activated 911.  Upon their arrival they noted him to have good eye contact and was alert oriented ×3 given the relatively good history is significant swelling of the tongue and bluish discoloration of the tongue and blood in the mouth.  There appears to be no other traumatic events of.    Patient is not complaining of chest pain no shortness of breath no blurred vision no double vision no headaches no abdominal pain at this time.  Patient is a 52 y.o. male presenting with seizures.   History provided by:  Patient, EMS personnel and relative   used: No    Seizures   Seizure activity on arrival: no    Seizure type:  Grand mal  Preceding symptoms: aura    Initial focality:  None  Return to baseline: yes    Severity:  Moderate  Timing:  Once  PTA treatment:  None  History of seizures: no        Review of Systems   Constitutional: Negative.    HENT: Negative.    Cardiovascular: Negative.    Gastrointestinal: Negative.    Genitourinary: Negative.    Musculoskeletal: Negative.    Allergic/Immunologic: Negative.    Neurological: Positive for seizures.   Hematological: Negative.    Psychiatric/Behavioral: Negative.    All other systems reviewed and are negative.      Past Medical History:   Diagnosis Date   • Anxiety    • Arthritis    • Gout    • HTN (hypertension)    • Hyperlipidemia        Allergies   Allergen  Reactions   • Lipitor [Atorvastatin] Rash       History reviewed. No pertinent surgical history.    Family History   Problem Relation Age of Onset   • Hypertension Mother    • Diabetes Mother    • Heart disease Father    • Hypertension Father    • No Known Problems Daughter    • No Known Problems Son    • Diabetes Maternal Grandmother    • No Known Problems Maternal Grandfather    • No Known Problems Paternal Grandmother    • Heart attack Paternal Grandfather    • Kidney disease Sister        Social History     Social History   • Marital status: Single     Spouse name: N/A   • Number of children: N/A   • Years of education: N/A     Social History Main Topics   • Smoking status: Current Every Day Smoker     Packs/day: 0.33     Years: 30.00     Types: Cigarettes   • Smokeless tobacco: None   • Alcohol use 1.2 oz/week     2 Cans of beer per week      Comment: PER DAY FOR 30 YEARS   • Drug use: No   • Sexual activity: Defer     Other Topics Concern   • None     Social History Narrative           Objective   Physical Exam   Constitutional: He is oriented to person, place, and time. He appears well-developed and well-nourished.   HENT:   Head: Normocephalic and atraumatic.   Eyes: EOM are normal. Pupils are equal, round, and reactive to light.   Neck: Normal range of motion.   Cardiovascular: Normal rate and regular rhythm.    Pulmonary/Chest: Effort normal and breath sounds normal.   Abdominal: Soft. Bowel sounds are normal.   Musculoskeletal: Normal range of motion.   Neurological: He is alert and oriented to person, place, and time. He has normal reflexes.   Skin: Skin is warm and dry.   Psychiatric: He has a normal mood and affect. His behavior is normal. Judgment and thought content normal.   Nursing note and vitals reviewed.      Procedures         ED Course  ED Course   Value Comment By Time    Patient is resting comfortably on reexamination daughter is at bedside.  There is no further history that I am able to  elicit in addition to what has been documented in the HPI. Ming GIBBS MD 07/03 1120    Spoke to the radiologist regarding the CT scan of the head which reveals a possible acute ischemic event in the occipital area at this time were ordering an MRI of the brain with and without IV contrast. Ming GIBBS MD 07/03 1121   Anion Gap: 10.0 (Reviewed) Ming GIBBS MD 07/03 1305    MRI:  IMPRESSION:  1. Posterior reversible encephalopathy syndrome. Follow-up in 8 weeks is  suggested. Ming GIBBS MD 07/03 1306                  MDM  Number of Diagnoses or Management Options  Angioedema, sequela: new and requires workup  Seizures: new and requires workup  Diagnosis management comments: He is angioedema most likely secondary to his medications (Lisinopril) and a seizure which is convincing story given that he has a laceration of his tongue.  His CT of the head and MRI shows no acute bleed I spoke to the daughter at bedside and the patient regarding our plan to admit Spoke to the hospitalist patient will be admitted to ICU.  I gave a total 2 doses of direct Decadron.  Repeat examination at 1:15 PM he seems to be better however there is significant amount of swelling of the tongue still.       Amount and/or Complexity of Data Reviewed  Clinical lab tests: reviewed and ordered  Tests in the radiology section of CPT®: reviewed and ordered  Discussion of test results with the performing providers: yes  Obtain history from someone other than the patient: yes  Discuss the patient with other providers: yes  Independent visualization of images, tracings, or specimens: yes    Risk of Complications, Morbidity, and/or Mortality  Presenting problems: high  Diagnostic procedures: high  Management options: high    Critical Care  Total time providing critical care: < 30 minutes    Patient Progress  Patient progress: stable      Final diagnoses:   Seizures   Angioedema, sequela            Ming GIBBS MD  07/03/17 4919

## 2017-07-03 NOTE — CONSULTS
Neurology Consult Note    Referring Provider: Jairon Perez  Reason for Consultation: Seizure, PRES      History of present illness:      52 year old with a history of HTN, gout, hyperlipidemia.  On lisinopril per his primary care provider.  This morning, he woke up with some headache but feeling tired and sports.  He checked his blood pressure at home which was 237/120.  His father's house where he lost consciousness.  At that point he suffered a generalized tonic-clonic seizure.  Seizure was of unknown duration, he did bite his tongue, no bladder or bowel incontinence.  He has no prior history of seizures, no family history of seizures.  He woke up with EMS by his side.  He was brought to the ER where MRI of the brain showed swelling and edema in the posterior region consistent with PRES.  admitted to the ICU for further monitoring.  Dates that he has been on lisinopril for years and the tongue swelling that he is exhibiting started before he had the seizure and bit his tongue.  It is unclear whether this was some angioedema prior.  He currently denies any headache, nausea or vomiting, chest pain.  He does have chronic shoulder pain and dislocated shoulders, right greater than left.  He also has a history of sleep apnea but does not wear his CPAP machine.His legs have been swelling for quite some time.  He notes drinking a case of beer a week, his last drink was 2 days ago.    Past Medical History:   Diagnosis Date   • Anxiety    • Arthritis    • Gout    • HTN (hypertension)    • Hyperlipidemia        Allergies   Allergen Reactions   • Lipitor [Atorvastatin] Rash     No current facility-administered medications on file prior to encounter.      Current Outpatient Prescriptions on File Prior to Encounter   Medication Sig   • cloNIDine (CATAPRES) 0.1 MG tablet Take 0.1 mg by mouth every night.   • fenofibrate (TRICOR) 145 MG tablet Take 1 tablet by mouth Every Night.   • indomethacin (INDOCIN) 50 MG capsule Take 1  capsule by mouth 3 (Three) Times a Day As Needed for mild pain (1-3). (Patient taking differently: Take 50 mg by mouth 3 (Three) Times a Day As Needed for Mild Pain (1-3) (gout).)   • lisinopril-hydrochlorothiazide (PRINZIDE,ZESTORETIC) 20-25 MG per tablet Take 1 tablet by mouth Daily.   • [DISCONTINUED] metoprolol tartrate (LOPRESSOR) 50 MG tablet Take 1 tablet by mouth 2 (Two) Times a Day.   • vardenafil (LEVITRA) 10 MG tablet Take 1 tablet by mouth Daily As Needed for erectile dysfunction.   • [DISCONTINUED] allopurinol (ZYLOPRIM) 300 MG tablet Take 1 tablet by mouth Daily. prn   • [DISCONTINUED] ibuprofen (ADVIL,MOTRIN) 800 MG tablet Take 1 tablet by mouth Every 6 (Six) Hours As Needed for mild pain (1-3) or moderate pain (4-6). (Patient taking differently: Take 800 mg by mouth Every 6 (Six) Hours As Needed for Moderate Pain (4-6).)       Social History     Social History   • Marital status: Single     Spouse name: N/A   • Number of children: N/A   • Years of education: N/A     Occupational History   • Not on file.     Social History Main Topics   • Smoking status: Current Every Day Smoker     Packs/day: 0.33     Years: 30.00     Types: Cigarettes   • Smokeless tobacco: Not on file   • Alcohol use 1.2 oz/week     2 Cans of beer per week      Comment: PER DAY FOR 30 YEARS   • Drug use: No   • Sexual activity: Defer     Other Topics Concern   • Not on file     Social History Narrative     Family History   Problem Relation Age of Onset   • Hypertension Mother    • Diabetes Mother    • Heart disease Father    • Hypertension Father    • No Known Problems Daughter    • No Known Problems Son    • Diabetes Maternal Grandmother    • No Known Problems Maternal Grandfather    • No Known Problems Paternal Grandmother    • Heart attack Paternal Grandfather    • Kidney disease Sister        Review of Systems  A 14 point review of systems was reviewed and was negative except for noted in HPI    Vital Signs   Temp:  [98 °F  (36.7 °C)] 98 °F (36.7 °C)  Heart Rate:  [62-97] 62  Resp:  [18] 18  BP: (162-207)/() 177/93    General Exam:  Head:  Normal cephalic, atraumatic  HEENT:  Neck supple  CVS:  Regular rate and rhythm.  No murmurs  Carotid Examination:  No bruits  Lungs:  Clear to auscultation  Abdomen:  Non-tender, Non-distended  Extremities:  No signs of peripheral edema  Skin:  No rashes  Tongue: swollen with hematoma    Neurologic Exam:    Mental Status:    -Awake, Alert, Oriented X 3  -No word finding difficulties  -No aphasia  -Mild dysarthria, tongue swelling  -Follows simple and complex commands    CN II:  Visual fields full.  Pupils equally reactive to light  CN III, IV, VI:  Extraocular Muscles full with no signs of nystagmus  CN V:  Facial sensory is symmetric with no asymetries.  CN VII:  Facial motor symmetric  CN VIII:  Gross hearing intact bilaterally  CN IX:  Palate elevates symmetrically  CN X:  Palate elevates symmetrically  CN XI:  Shoulder shrug symmetric  CN XII:  Tongue is midline on protrusion, discolored, significantly swollen    Motor: (strength out of 5:  1= minimal movement, 2 = movement in plane of gravity, 3 = movement against gravity, 4 = movement against some resistance, 5 = full strength)    -Right Upper Ext: Proximal: limited ROM chronic Distal: 5  -Left Upper Ext: Proximal: 5 Distal: 5    -Right Lower Ext: Proximal: 5 Distal: 5  -Left Lower Ext: Proximal: 5 Distal: 5    DTR:  -Right   Bicep: 2+ Tricep: 2+ Brachoradialis: 2+   Patella: 2+ Ankle: 2+ Babinski W/D  -Left   Bicep: 2+ Tricep: 2+ Brachoradialis: 2+   Patella: 2+ Ankle: 2+ Babinski W/D    Sensory:  -Intact to light touch, pinprick, temperature, pain, and proprioception    Coordination:  -Finger to nose intact, difficult on the right secondary to shoulder pain  -Heel to shin intact  -No ataxia    Gait  -No signs of ataxia  -ambulates unassisted      Results Review:  Lab Results (last 24 hours)     Procedure Component Value Units Date/Time     CBC & Differential [[id number]] Collected:  07/03/17 0807    Specimen:  Blood Updated:  07/03/17 0832    Narrative:       The following orders were created for panel order CBC & Differential.  Procedure                               Abnormality         Status                     ---------                               -----------         ------                     CBC Auto Differential[[id number]]        Abnormal            Final result                 Please view results for these tests on the individual orders.    CBC Auto Differential [[id number]]  (Abnormal) Collected:  07/03/17 0807    Specimen:  Blood Updated:  07/03/17 0832     WBC 11.95 (H) 10*3/mm3      RBC 3.93 (L) 10*6/mm3      Hemoglobin 11.9 (L) g/dL      Hematocrit 34.1 (L) %      MCV 86.8 fL      MCH 30.3 pg      MCHC 34.9 g/dL      RDW 14.0 %      RDW-SD 44.0 fl      MPV 9.6 fL      Platelets 245 10*3/mm3      Neutrophil % 82.0 (H) %      Lymphocyte % 8.3 (L) %      Monocyte % 8.9 %      Eosinophil % 0.3 %      Basophil % 0.2 %      Immature Grans % 0.3 %      Neutrophils, Absolute 9.81 (H) 10*3/mm3      Lymphocytes, Absolute 0.99 10*3/mm3      Monocytes, Absolute 1.06 10*3/mm3      Eosinophils, Absolute 0.03 10*3/mm3      Basophils, Absolute 0.02 10*3/mm3      Immature Grans, Absolute 0.04 (H) 10*3/mm3     Comprehensive Metabolic Panel [[id number]]  (Abnormal) Collected:  07/03/17 0807    Specimen:  Blood Updated:  07/03/17 0841     Glucose 127 (H) mg/dL      BUN 9 mg/dL      Creatinine 1.19 mg/dL      Sodium 141 mmol/L      Potassium 3.0 (L) mmol/L      Chloride 102 mmol/L      CO2 29.0 mmol/L      Calcium 8.8 mg/dL      Total Protein 6.9 g/dL      Albumin 4.00 g/dL      ALT (SGPT) 39 U/L      AST (SGOT) 72 (H) U/L      Alkaline Phosphatase 69 U/L      Total Bilirubin 0.6 mg/dL      eGFR Non African Amer 64 mL/min/1.73      Globulin 2.9 gm/dL      A/G Ratio 1.4 g/dL      BUN/Creatinine Ratio 7.6     Anion Gap 10.0 mmol/L     Urinalysis With / Culture  If Indicated [[id number]]  (Abnormal) Collected:  07/03/17 0834    Specimen:  Urine from Urine, Clean Catch Updated:  07/03/17 0858     Color, UA Yellow     Appearance, UA Clear     pH, UA 7.5     Specific Gravity, UA 1.014     Glucose, UA Negative     Ketones, UA Negative     Bilirubin, UA Negative     Blood, UA Small (1+) (A)     Protein, UA 30 mg/dL (1+) (A)     Leuk Esterase, UA Negative     Nitrite, UA Negative     Urobilinogen, UA 0.2 E.U./dL    Urinalysis, Microscopic Only [[id number]]  (Abnormal) Collected:  07/03/17 0834    Specimen:  Urine from Urine, Clean Catch Updated:  07/03/17 0858     RBC, UA 3-5 (A) /HPF      WBC, UA 0-2 (A) /HPF      Bacteria, UA None Seen /HPF      Squamous Epithelial Cells, UA 0-2 /HPF      Hyaline Casts, UA None Seen /LPF      Methodology Automated Microscopy    Urine Drug Screen [[id number]]  (Normal) Collected:  07/03/17 0834    Specimen:  Urine from Urine, Clean Catch Updated:  07/03/17 0930     Amphetamine Screen, Urine Negative     Barbiturates Screen, Urine Negative     Benzodiazepine Screen, Urine Negative     Cocaine Screen, Urine Negative     Methadone Screen, Urine Negative     Opiate Screen Negative     Phencyclidine (PCP), Urine Negative     THC, Screen, Urine Negative    Narrative:       Negative Thresholds For Drugs Screened in Urine:    Amphetamines          500 ng/ml  Barbiturates          200 ng/ml  Benzodiazepines       200 ng/ml  Cocaine               150 ng/ml  Methadone             150 ng/ml  Opiates               300 ng/ml  Phencyclidine         25 ng/ml  THC                      50 ng/ml    The normal value for all drugs tested is negative. This report includes final unconfirmed screening results.  A positive result by this assay can be, at your request, sent to the Reference Lab for confirmation by gas chromatography. Unconfirmed results must not be used for non-medical purposes, such as employment or legal testing. Clinical consideration should be applied to  any drug of abuse test result, particularly when unconfirmed results are used.          .  Imaging Results (last 24 hours)     Procedure Component Value Units Date/Time    XR Shoulder 2+ View Right [[id number]] Collected:  07/03/17 0913     Updated:  07/03/17 0918    Narrative:       EXAMINATION: XR SHOULDER 2+ VW RIGHT-     7/3/2017 10:02 AM EDT     HISTORY: Right shoulder pain.     Right shoulder, 3 views.     Old healed fracture of the right clavicle at the junction of the mid and  distal one third.  Bony hypertrophy of the distal clavicle with narrowing of the outlet and  increased risk for impingement.  No AC joint separation.     Mild humeral head spurring.     High riding humeral head with the appearance of rotator cuff  insufficiency.     Summary:  1. Bony narrowing of the outlet based on the clavicle.  2. High riding humeral head.  3. MRI correlation recommended to assess the rotator cuff integrity..  This report was finalized on 07/03/2017 09:15 by Dr. Camilo Almodovar MD.    CT Head Without Contrast [[id number]] Collected:  07/03/17 1114     Updated:  07/03/17 1123    Narrative:       EXAMINATION: CT HEAD WO CONTRAST-      7/3/2017 9:43 AM EDT     HISTORY: seizures     In order to have a CT radiation dose as low as reasonably achievable  Automated Exposure Control was utilized for adjustment of the mA and/or  KV according to patient size.     DLP in mGycm= 821.     Ill-defined low density within both occipital lobes. No mass effect. No  acute hemorrhage.     Normal ventricle size.     No skull fracture is seen.  Clear paranasal sinuses.     Summary:  1. Bilateral occipital lobe hypodensity compatible with subacute  ischemic change. MRI correlation recommended.  2. Report called to Dr. Zamarripa in the emergency room at 11:15 AM.                                   This report was finalized on 07/03/2017 11:20 by Dr. Camilo Almodovar MD.    MRI Brain With & Without Contrast [[id number]] Collected:  07/03/17 1238      Updated:  07/03/17 1306    Narrative:       MRI BRAIN W WO CONTRAST- 7/3/2017 11:57 AM CDT     HISTORY: Ischemic changes; R13.12-Dysphagia, oropharyngeal phase     COMPARISON: None.       TECHNIQUE: Multiplanar imaging of the brain was performed in a routine  fashion before and after the intravenous injection of gadolinium  contrast.     FINDINGS:   Diffusion: No restriction of diffusion to suggest acute ischemia.     Midline structures: Nondisplaced.     Ventricles: Normal in configuration and symmetric in size.     Masses: No masses or mass effect.     Basilar cisterns: Maintained.     Extra axial space: No abnormal extra-axial fluid.     Gray-white matter signal: There is abnormal signal. There is bilateral  subcortical signal on the T2 and FLAIR sequences involving the  cerebellum and posterior occipital and parietal lobes. There is no  associated enhancement. Most likely this is posterior reversible  encephalopathy syndrome. (P RES)     Cerebellum: Abnormal signal is noted in the cerebellum most likely  associated with the pres.     Brainstem: Normal.     Enhancement: No abnormal enhancement.     Other: Proximal cervical spinal cord is normal. Bilateral globes and  orbits are normal in appearance. Normal cerebrovascular flow voids  noted. Mucous cyst is noted in the right maxillary antrum mucosal  thickening is present in the left maxillary antrum       Impression:       1. Posterior reversible encephalopathy syndrome. Follow-up in 8 weeks is  suggested.         This report was finalized on 07/03/2017 12:53 by Dr. Adolph Echols MD.              Impression  2-year-old gentleman who has a history of hypertension, admitted with PRES most likely related to his malignant hypertension.  He apparently had a seizure today and bit his tongue.  He has significant tongue swelling.  He notes swelling prior to his seizure and may have had some angioedema secondary to his ACE inhibitor.    Plan  1.  The treatment of PRES is  as follows:   --Slowly lowering his blood pressure with no more than 25% reduction in the first 24 hours.  We should strive for a diastolic blood pressure less than 100.  On Cardene drip   --Typically, anticonvulsants are used to treat seizures associated with this disorder.  The plan will be to start him on medication and continue this until he has a repeat MRI in 4-6 weeks.  At that time, if his MRI is clear, he can be weaned off of the anti-epileptic.  An EEG at this point would be low yield.  I suspect that this is a provoked seizure secondary to his underlying medical issue.  2.  He notes a history of drinking.  We need to watch him closely for any evidence of DTs.  I suspect that his seizure is related to his PRES and not evidence of alcohol withdrawal.  3.  Tongue swelling:  Angioedema?  ENT consulted.  Thickened liquids    I discussed the patients findings and my recommendations with patient, family and consulting provider     Thank you for the opportunity to participate in his care.      Royal Campos MD  07/03/17  5:22 PM

## 2017-07-03 NOTE — PLAN OF CARE
Problem: Inpatient SLP  Goal: Dysphagia- Patient will safely consume diet as per recommendation with no signs/symptoms of aspiration  Outcome: Ongoing (interventions implemented as appropriate)    07/03/17 1027   Safely Consume Diet   Safely Consume Diet- SLP, Date Established 07/03/17   Safely Consume Diet- SLP, Time to Achieve by discharge   Safely Consume Diet- SLP, Additional Goal Pt will tolerate recommended diet as well as trials of upgraded diet consistencies w/o any overt s/s of aspiration.   Safely Consume Diet- SLP, Outcome goal ongoing

## 2017-07-04 ENCOUNTER — APPOINTMENT (OUTPATIENT)
Dept: GENERAL RADIOLOGY | Facility: HOSPITAL | Age: 52
End: 2017-07-04

## 2017-07-04 ENCOUNTER — APPOINTMENT (OUTPATIENT)
Dept: CARDIOLOGY | Facility: HOSPITAL | Age: 52
End: 2017-07-04
Attending: INTERNAL MEDICINE

## 2017-07-04 PROBLEM — R56.9 SEIZURES (HCC): Status: ACTIVE | Noted: 2017-07-04

## 2017-07-04 LAB
ANION GAP SERPL CALCULATED.3IONS-SCNC: 11 MMOL/L (ref 4–13)
ARTERIAL PATENCY WRIST A: ABNORMAL
ATMOSPHERIC PRESS: ABNORMAL MMHG
BASE EXCESS BLDA CALC-SCNC: 2.1 MMOL/L (ref -2–2)
BDY SITE: ABNORMAL
BH CV ECHO MEAS - AO MAX PG (FULL): 3 MMHG
BH CV ECHO MEAS - AO MAX PG: 10.5 MMHG
BH CV ECHO MEAS - AO MEAN PG (FULL): 2 MMHG
BH CV ECHO MEAS - AO MEAN PG: 5 MMHG
BH CV ECHO MEAS - AO ROOT AREA: 10.8 CM^2
BH CV ECHO MEAS - AO ROOT DIAM: 3.7 CM
BH CV ECHO MEAS - AO V2 MAX: 162 CM/SEC
BH CV ECHO MEAS - AO V2 MEAN: 101 CM/SEC
BH CV ECHO MEAS - AO V2 VTI: 28.4 CM
BH CV ECHO MEAS - AVA(I,A): 3.2 CM^2
BH CV ECHO MEAS - AVA(I,D): 3.2 CM^2
BH CV ECHO MEAS - AVA(V,A): 2.9 CM^2
BH CV ECHO MEAS - AVA(V,D): 2.9 CM^2
BH CV ECHO MEAS - CONTRAST EF 4CH: 70.7 ML/M^2
BH CV ECHO MEAS - EDV(CUBED): 80.1 ML
BH CV ECHO MEAS - EDV(MOD-SP4): 161 ML
BH CV ECHO MEAS - EDV(TEICH): 83.5 ML
BH CV ECHO MEAS - EF(CUBED): 78 %
BH CV ECHO MEAS - EF(MOD-SP4): 70.7 %
BH CV ECHO MEAS - EF(TEICH): 70.5 %
BH CV ECHO MEAS - ESV(CUBED): 17.6 ML
BH CV ECHO MEAS - ESV(MOD-SP4): 47.1 ML
BH CV ECHO MEAS - ESV(TEICH): 24.6 ML
BH CV ECHO MEAS - FS: 39.7 %
BH CV ECHO MEAS - IVS/LVPW: 0.66
BH CV ECHO MEAS - IVSD: 0.99 CM
BH CV ECHO MEAS - LA DIMENSION: 3.6 CM
BH CV ECHO MEAS - LA/AO: 0.97
BH CV ECHO MEAS - LAT PEAK E' VEL: 10.3 CM/SEC
BH CV ECHO MEAS - LV MASS(C)D: 194.2 GRAMS
BH CV ECHO MEAS - LV MAX PG: 7.5 MMHG
BH CV ECHO MEAS - LV MEAN PG: 3 MMHG
BH CV ECHO MEAS - LV V1 MAX: 137 CM/SEC
BH CV ECHO MEAS - LV V1 MEAN: 81 CM/SEC
BH CV ECHO MEAS - LV V1 VTI: 26.4 CM
BH CV ECHO MEAS - LVIDD: 4.3 CM
BH CV ECHO MEAS - LVIDS: 2.6 CM
BH CV ECHO MEAS - LVLD AP4: 9.3 CM
BH CV ECHO MEAS - LVLS AP4: 7.7 CM
BH CV ECHO MEAS - LVOT AREA (M): 3.5 CM^2
BH CV ECHO MEAS - LVOT AREA: 3.5 CM^2
BH CV ECHO MEAS - LVOT DIAM: 2.1 CM
BH CV ECHO MEAS - LVPWD: 1.5 CM
BH CV ECHO MEAS - MV A MAX VEL: 97.5 CM/SEC
BH CV ECHO MEAS - MV DEC TIME: 0.22 SEC
BH CV ECHO MEAS - MV E MAX VEL: 87.8 CM/SEC
BH CV ECHO MEAS - MV E/A: 0.9
BH CV ECHO MEAS - SV(AO): 305.4 ML
BH CV ECHO MEAS - SV(CUBED): 62.5 ML
BH CV ECHO MEAS - SV(LVOT): 91.4 ML
BH CV ECHO MEAS - SV(MOD-SP4): 113.9 ML
BH CV ECHO MEAS - SV(TEICH): 58.9 ML
BUN BLD-MCNC: 12 MG/DL (ref 5–21)
BUN/CREAT SERPL: 10.6 (ref 7–25)
CALCIUM SPEC-SCNC: 9.1 MG/DL (ref 8.4–10.4)
CHLORIDE SERPL-SCNC: 105 MMOL/L (ref 98–110)
CO2 SERPL-SCNC: 26 MMOL/L (ref 24–31)
CREAT BLD-MCNC: 1.13 MG/DL (ref 0.5–1.4)
E/E' RATIO: 10.6
GFR SERPL CREATININE-BSD FRML MDRD: 68 ML/MIN/1.73
GLUCOSE BLD-MCNC: 126 MG/DL (ref 70–100)
HCO3 BLDA-SCNC: 27.2 MMOL/L (ref 22–26)
HOROWITZ INDEX BLD+IHG-RTO: 60 %
LEFT ATRIUM VOLUME: 55 CM3
MAGNESIUM SERPL-MCNC: 2 MG/DL (ref 1.4–2.2)
MODALITY: ABNORMAL
PCO2 BLDA: 44.1 MM HG (ref 35–45)
PEEP RESPIRATORY: 5 CM[H2O]
PH BLDA: 7.41 PH UNITS (ref 7.35–7.45)
PO2 BLDA: 235.2 MM HG (ref 80–100)
POTASSIUM BLD-SCNC: 3.8 MMOL/L (ref 3.5–5.3)
SAO2 % BLDCOA: 99.5 % (ref 94–100)
SAO2 % BLDCOA: 99.5 % (ref 94–100)
SODIUM BLD-SCNC: 142 MMOL/L (ref 135–145)
TOTAL RATE: 10 BREATHS/MINUTE
VENT CPAP/PEEP: 5
VENTILATOR MODE: AC

## 2017-07-04 PROCEDURE — 94640 AIRWAY INHALATION TREATMENT: CPT

## 2017-07-04 PROCEDURE — 25010000002 MIDAZOLAM PER 1 MG

## 2017-07-04 PROCEDURE — 31500 INSERT EMERGENCY AIRWAY: CPT | Performed by: INTERNAL MEDICINE

## 2017-07-04 PROCEDURE — 99232 SBSQ HOSP IP/OBS MODERATE 35: CPT | Performed by: PSYCHIATRY & NEUROLOGY

## 2017-07-04 PROCEDURE — 25010000002 LEVETIRACETAM IN NACL 0.82% 500 MG/100ML SOLUTION: Performed by: PSYCHIATRY & NEUROLOGY

## 2017-07-04 PROCEDURE — 93306 TTE W/DOPPLER COMPLETE: CPT | Performed by: INTERNAL MEDICINE

## 2017-07-04 PROCEDURE — 25010000002 HALOPERIDOL LACTATE PER 5 MG: Performed by: INTERNAL MEDICINE

## 2017-07-04 PROCEDURE — 25010000002 SUCCINYLCHOLINE PER 20 MG: Performed by: INTERNAL MEDICINE

## 2017-07-04 PROCEDURE — 25010000002 PERFLUTREN 6.52 MG/ML SUSPENSION

## 2017-07-04 PROCEDURE — 0BH17EZ INSERTION OF ENDOTRACHEAL AIRWAY INTO TRACHEA, VIA NATURAL OR ARTIFICIAL OPENING: ICD-10-PCS | Performed by: INTERNAL MEDICINE

## 2017-07-04 PROCEDURE — 94799 UNLISTED PULMONARY SVC/PX: CPT

## 2017-07-04 PROCEDURE — 25010000002 SUCCINYLCHOLINE PER 20 MG

## 2017-07-04 PROCEDURE — 25010000002 PERFLUTREN (DEFINITY) 8.476 MG IN SODIUM CHLORIDE 10 ML INJECTION: Performed by: INTERNAL MEDICINE

## 2017-07-04 PROCEDURE — 82803 BLOOD GASES ANY COMBINATION: CPT

## 2017-07-04 PROCEDURE — C8929 TTE W OR WO FOL WCON,DOPPLER: HCPCS

## 2017-07-04 PROCEDURE — 25010000002 PROPOFOL 1000 MG/ML EMULSION

## 2017-07-04 PROCEDURE — 25010000002 THIAMINE PER 100 MG: Performed by: INTERNAL MEDICINE

## 2017-07-04 PROCEDURE — 25010000002 PROPOFOL 1000 MG/ML EMULSION: Performed by: INTERNAL MEDICINE

## 2017-07-04 PROCEDURE — 71010 HC CHEST PA OR AP: CPT

## 2017-07-04 PROCEDURE — 80048 BASIC METABOLIC PNL TOTAL CA: CPT | Performed by: INTERNAL MEDICINE

## 2017-07-04 PROCEDURE — 36600 WITHDRAWAL OF ARTERIAL BLOOD: CPT

## 2017-07-04 PROCEDURE — 83735 ASSAY OF MAGNESIUM: CPT | Performed by: INTERNAL MEDICINE

## 2017-07-04 PROCEDURE — 25010000002 LORAZEPAM PER 2 MG: Performed by: INTERNAL MEDICINE

## 2017-07-04 PROCEDURE — 94002 VENT MGMT INPAT INIT DAY: CPT

## 2017-07-04 RX ORDER — DIAZEPAM 10 MG/1
10 TABLET ORAL EVERY 8 HOURS SCHEDULED
Status: DISCONTINUED | OUTPATIENT
Start: 2017-07-04 | End: 2017-07-05

## 2017-07-04 RX ORDER — LORAZEPAM 2 MG/ML
1 INJECTION INTRAMUSCULAR EVERY 6 HOURS PRN
Status: DISCONTINUED | OUTPATIENT
Start: 2017-07-04 | End: 2017-07-04

## 2017-07-04 RX ORDER — LORAZEPAM 2 MG/ML
1 INJECTION INTRAMUSCULAR
Status: DISCONTINUED | OUTPATIENT
Start: 2017-07-04 | End: 2017-07-04

## 2017-07-04 RX ORDER — MIDAZOLAM HYDROCHLORIDE 1 MG/ML
5 INJECTION INTRAMUSCULAR; INTRAVENOUS ONCE
Status: COMPLETED | OUTPATIENT
Start: 2017-07-04 | End: 2017-07-04

## 2017-07-04 RX ORDER — METOPROLOL TARTRATE 50 MG/1
50 TABLET, FILM COATED ORAL EVERY 12 HOURS SCHEDULED
Status: DISCONTINUED | OUTPATIENT
Start: 2017-07-04 | End: 2017-07-05

## 2017-07-04 RX ORDER — THIAMINE HYDROCHLORIDE 100 MG/ML
100 INJECTION, SOLUTION INTRAMUSCULAR; INTRAVENOUS ONCE
Status: DISCONTINUED | OUTPATIENT
Start: 2017-07-04 | End: 2017-07-04

## 2017-07-04 RX ORDER — CHLORHEXIDINE GLUCONATE 0.12 MG/ML
15 RINSE ORAL EVERY 12 HOURS SCHEDULED
Status: DISCONTINUED | OUTPATIENT
Start: 2017-07-04 | End: 2017-07-09 | Stop reason: ALTCHOICE

## 2017-07-04 RX ORDER — LANOLIN ALCOHOL/MO/W.PET/CERES
400 CREAM (GRAM) TOPICAL DAILY
Status: DISCONTINUED | OUTPATIENT
Start: 2017-07-04 | End: 2017-07-06

## 2017-07-04 RX ORDER — ETOMIDATE 2 MG/ML
20 INJECTION INTRAVENOUS ONCE
Status: COMPLETED | OUTPATIENT
Start: 2017-07-04 | End: 2017-07-04

## 2017-07-04 RX ORDER — MIDAZOLAM HYDROCHLORIDE 1 MG/ML
INJECTION INTRAMUSCULAR; INTRAVENOUS
Status: COMPLETED
Start: 2017-07-04 | End: 2017-07-04

## 2017-07-04 RX ORDER — LORAZEPAM 2 MG/ML
2 INJECTION INTRAMUSCULAR
Status: DISCONTINUED | OUTPATIENT
Start: 2017-07-04 | End: 2017-07-05

## 2017-07-04 RX ORDER — LORAZEPAM 2 MG/ML
2 INJECTION INTRAMUSCULAR ONCE
Status: COMPLETED | OUTPATIENT
Start: 2017-07-04 | End: 2017-07-04

## 2017-07-04 RX ORDER — DIAZEPAM 5 MG/1
5 TABLET ORAL EVERY 8 HOURS SCHEDULED
Status: DISCONTINUED | OUTPATIENT
Start: 2017-07-04 | End: 2017-07-04

## 2017-07-04 RX ORDER — ALBUTEROL SULFATE 2.5 MG/3ML
2.5 SOLUTION RESPIRATORY (INHALATION)
Status: DISCONTINUED | OUTPATIENT
Start: 2017-07-04 | End: 2017-07-18

## 2017-07-04 RX ORDER — ETOMIDATE 2 MG/ML
20 INJECTION INTRAVENOUS ONCE
Status: DISCONTINUED | OUTPATIENT
Start: 2017-07-04 | End: 2017-07-04

## 2017-07-04 RX ORDER — HALOPERIDOL 5 MG/ML
5 INJECTION INTRAMUSCULAR ONCE
Status: COMPLETED | OUTPATIENT
Start: 2017-07-04 | End: 2017-07-04

## 2017-07-04 RX ORDER — SUCCINYLCHOLINE CHLORIDE 20 MG/ML
50 INJECTION INTRAMUSCULAR; INTRAVENOUS ONCE
Status: COMPLETED | OUTPATIENT
Start: 2017-07-04 | End: 2017-07-04

## 2017-07-04 RX ADMIN — HALOPERIDOL LACTATE 5 MG: 5 INJECTION, SOLUTION INTRAMUSCULAR at 14:39

## 2017-07-04 RX ADMIN — ETOMIDATE 20 MG: 2 INJECTION INTRAVENOUS at 15:14

## 2017-07-04 RX ADMIN — LORAZEPAM 2 MG: 2 INJECTION INTRAMUSCULAR; INTRAVENOUS at 20:32

## 2017-07-04 RX ADMIN — PROPOFOL 75 MCG/KG/MIN: 10 INJECTION, EMULSION INTRAVENOUS at 20:19

## 2017-07-04 RX ADMIN — CHLORHEXIDINE GLUCONATE 15 ML: 1.2 RINSE ORAL at 20:32

## 2017-07-04 RX ADMIN — NICOTINE 1 PATCH: 14 PATCH, EXTENDED RELEASE TRANSDERMAL at 17:49

## 2017-07-04 RX ADMIN — MIDAZOLAM HYDROCHLORIDE 5 MG: 1 INJECTION INTRAMUSCULAR; INTRAVENOUS at 15:28

## 2017-07-04 RX ADMIN — LEVETIRACETAM 500 MG: 5 INJECTION INTRAVENOUS at 10:23

## 2017-07-04 RX ADMIN — METOPROLOL TARTRATE 50 MG: 50 TABLET ORAL at 10:18

## 2017-07-04 RX ADMIN — SUCCINYLCHOLINE CHLORIDE 50 MG: 20 INJECTION, SOLUTION INTRAMUSCULAR; INTRAVENOUS at 15:20

## 2017-07-04 RX ADMIN — DIAZEPAM 10 MG: 10 TABLET ORAL at 13:42

## 2017-07-04 RX ADMIN — LEVETIRACETAM 500 MG: 5 INJECTION INTRAVENOUS at 20:34

## 2017-07-04 RX ADMIN — DIAZEPAM 10 MG: 10 TABLET ORAL at 08:07

## 2017-07-04 RX ADMIN — THIAMINE HYDROCHLORIDE 100 MG: 100 INJECTION, SOLUTION INTRAMUSCULAR; INTRAVENOUS at 18:29

## 2017-07-04 RX ADMIN — LORAZEPAM 2 MG: 2 INJECTION INTRAMUSCULAR; INTRAVENOUS at 22:38

## 2017-07-04 RX ADMIN — LORAZEPAM 1 MG: 2 INJECTION INTRAMUSCULAR; INTRAVENOUS at 09:52

## 2017-07-04 RX ADMIN — LORAZEPAM 1 MG: 2 INJECTION INTRAMUSCULAR; INTRAVENOUS at 17:17

## 2017-07-04 RX ADMIN — PROPOFOL 75 MCG/KG/MIN: 10 INJECTION, EMULSION INTRAVENOUS at 17:17

## 2017-07-04 RX ADMIN — MIDAZOLAM HYDROCHLORIDE 5 MG: 1 INJECTION, SOLUTION INTRAMUSCULAR; INTRAVENOUS at 15:28

## 2017-07-04 RX ADMIN — PROPOFOL 50 MCG/KG/MIN: 10 INJECTION, EMULSION INTRAVENOUS at 15:25

## 2017-07-04 RX ADMIN — LORAZEPAM 2 MG: 2 INJECTION INTRAMUSCULAR; INTRAVENOUS at 14:09

## 2017-07-04 RX ADMIN — PERFLUTREN 3 MG: 6.52 INJECTION, SUSPENSION INTRAVENOUS at 10:10

## 2017-07-04 RX ADMIN — ALBUTEROL SULFATE 2.5 MG: 2.5 SOLUTION RESPIRATORY (INHALATION) at 18:17

## 2017-07-04 RX ADMIN — ETOMIDATE 20 MG: 2 INJECTION INTRAVENOUS at 15:17

## 2017-07-04 RX ADMIN — NICARDIPINE HYDROCHLORIDE 2.5 MG/HR: 25 INJECTION INTRAVENOUS at 17:49

## 2017-07-04 RX ADMIN — SODIUM CHLORIDE 3 ML: 9 INJECTION INTRAMUSCULAR; INTRAVENOUS; SUBCUTANEOUS at 10:45

## 2017-07-04 RX ADMIN — PROPOFOL 75 MCG/KG/MIN: 10 INJECTION, EMULSION INTRAVENOUS at 23:10

## 2017-07-04 NOTE — PROCEDURES
"Intubation  Date/Time: 7/4/2017 3:28 PM  Performed by: LAURA CASH  Authorized by: LAURA CASH   Consent: The procedure was performed in an emergent situation. Verbal consent obtained. Written consent not obtained.  Risks and benefits: risks, benefits and alternatives were discussed  Consent given by: His daughter.  Required items: required blood products, implants, devices, and special equipment available  Patient identity confirmed: arm band  Time out: Immediately prior to procedure a \"time out\" was called to verify the correct patient, procedure, equipment, support staff and site/side marked as required.  Indications: airway protection (Alcohol withdrawal complicated by hyperactive activity and combativeness.)  Intubation method: video-assisted  Patient status: paralyzed (RSI)  Preoxygenation: BVM  Pretreatment medications: midazolam  Sedatives: etomidate  Paralytic: succinylcholine  Laryngoscope size: Mac 3  Tube size: 8.0 mm  Tube type: cuffed  Number of attempts: 2  Ventilation between attempts: BVM  Cricoid pressure: no  Cords visualized: yes  Post-procedure assessment: chest rise,  ETCO2 monitor and CO2 detector  Breath sounds: equal  ETT to lip: 24 cm  Tube secured with: ETT lopez  Patient tolerance: Patient tolerated the procedure well with no immediate complications      The patient minimized the amount of alcohol intake yesterday when initially interviewed.  He began to experience minor withdrawal symptoms this morning.  Dr. Campos and I placed him on scheduled Valium and as needed Ativan.  The patient has had worsening of symptoms through the course of the day.  He ultimately became very combative and required Haldol administration as well as restraints. I felt that the patient was a danger not only to himself, but others.  I discussed this with his daughter and we electively intubated him.  He will be sedated with Diprivan.     Stat 1 view CXR is pending.     Place on Pepcid (Protonix shortage) " for PUD prophylaxis.     I plan to manage his ventilator for now.     Jairon Perez,   07/04/17  3:31 PM

## 2017-07-04 NOTE — PROGRESS NOTES
Neurology Progress Note      Chief Complaint:  PRES, Seizure, possible W/D alcohol    Subjective     Subjective:    Overnight, off and on Cardene, he remains agitated at times, low grade fevers, no seizure activity but confused    Medications:  Current Facility-Administered Medications   Medication Dose Route Frequency Provider Last Rate Last Dose   • acetaminophen (TYLENOL) tablet 650 mg  650 mg Oral Q4H PRN Jairon Perez DO   650 mg at 07/03/17 2010   • famotidine (PEPCID) injection 20 mg  20 mg Intravenous Q12H Jairon Perez DO   20 mg at 07/03/17 2211   • HYDROcodone-acetaminophen (NORCO)  MG per tablet 1 tablet  1 tablet Oral Q4H PRN Jairon Perez DO   1 tablet at 07/03/17 1758   • HYDROcodone-acetaminophen (NORCO) 5-325 MG per tablet 1 tablet  1 tablet Oral Q4H PRN Jairon Perez DO       • HYDROmorphone (DILAUDID) injection 0.5 mg  0.5 mg Intravenous Q3H PRN Jairon Perez DO        And   • naloxone (NARCAN) injection 0.4 mg  0.4 mg Intravenous Q5 Min PRN Jairon Perez DO       • levETIRAcetam in NaCl 0.82% (KEPPRA) IVPB 500 mg  500 mg Intravenous Q12H Royal Campos MD       • LORazepam (ATIVAN) injection 2 mg  2 mg Intravenous Q5 Min PRN Royal Campos MD       • niCARdipine (CARDENE) 25 mg/250 mL (0.1 mg/mL) 0.9% NS infusion  5-15 mg/hr Intravenous Titrated Jairon Perez DO 50 mL/hr at 07/04/17 0636 5 mg/hr at 07/04/17 0636   • nicotine (NICODERM CQ) 14 MG/24HR patch 1 patch  1 patch Transdermal Q24H Jairon Perez DO   1 patch at 07/03/17 1758   • ondansetron (ZOFRAN) injection 4 mg  4 mg Intravenous Q6H PRN Jairon Perez DO       • sodium chloride 0.9 % flush 1-10 mL  1-10 mL Intravenous PRN Jairon Perez DO           Review of Systems:   -A 14 point review of systems is completed and is negative except for tongue pain, agitation      Objective      Vital Signs  Temp:  [98 °F (36.7 °C)-99.7 °F (37.6 °C)] 99.4 °F (37.4 °C)  Heart Rate:  [] 84  Resp:  [18-22]  20  BP: (133-207)/() 166/122    Physical Exam:  NC/AT  Tongue remains bruised, swollen  Tachycardic  CTAB  Abd NT  Ext:  No C/C/E  Diaphoretic    Neuro:  Awake, alert, oriented X 3, agitated  CN:  PERRL, Face symmetric, tongue midline, palate symmetric, hearing intact  Moving all extremities 5/5 throughout  DTRs:  1+ and symmetric, W/D to Babinski  Mild finger to nose tremor  Following all commands  Fluent, mild dysarthria     Results Review:    I reviewed the patient's new clinical results.      Results from last 7 days  Lab Units 07/03/17  0807   WBC 10*3/mm3 11.95*   HEMOGLOBIN g/dL 11.9*   HEMATOCRIT % 34.1*   PLATELETS 10*3/mm3 245          Results from last 7 days  Lab Units 07/03/17  0807   SODIUM mmol/L 141   POTASSIUM mmol/L 3.0*   CHLORIDE mmol/L 102   CO2 mmol/L 29.0   BUN mg/dL 9   CREATININE mg/dL 1.19   CALCIUM mg/dL 8.8   BILIRUBIN mg/dL 0.6   ALK PHOS U/L 69   ALT (SGPT) U/L 39   AST (SGOT) U/L 72*   GLUCOSE mg/dL 127*        No results found for: PHOS, MG, PROTIME, INR, APTT  No components found for: POCGLUC  No components found for: A1C  Lab Results   Component Value Date    HDL 36 02/04/2016     No components found for: B12  No results found for: TSH    Assessment/Plan     Hospital Problem List    Active Problems:    Angio-edema    Impression  52-year-old gentleman who has a history of hypertension, admitted EtOH use, admitted with PRES, seizure, now with some agitation.  Also bit his tongue and has some swelling, may have developed angioedema prior to the PRES.     Plan  1. PRES   --Continue to slowly lower blood pressure, goal DBP < 100, SBPs in the 170s range.    --titrate Cardene as necessary.     --Continue Keppra.  Will be on this until he has a clear MRI as o/p in 4-6 weeks.    2. History of heavy EtOH use.  Now agitated, tachycardic, diaphoretic   --Last known drink was several days ago.  I believe he is currently withdrawing.     --Spoke with Primary who will start BZD.     --This  is likely an underlying problem and most likely not the cause of his seizure.   --Monitor BPs closely on BZD, adjust Cardene as necessary   --Electrolytes, fluids per Primary.  Start Banana Bag    3. Tongue swelling: Angioedema? ENT consulted. Advance diet as tolerated     Continue ICU monitoring      Royal Campos MD  07/04/17  7:35 AM

## 2017-07-04 NOTE — NURSING NOTE
"Around 1415 patient transferred from CCU 4 to ICU 7 in a lateral transfer.  Patient tolerated move well, and daughter was at bedside.  Patient then became agitated and was trying to get out of bed frequently and did not reorient.  Several nurses at bedside to get patient back into bed each time.  At approx 1430 the patient got out of bed and refused to get back in bed.  Patient became aggressive and pushing at nurses to get out of his way.  A code gray was called and security helped assist to get patient in bed.  Dr Perez was paged and 5mg haldol IM was ordered and 4 point restraints.  Patient was placed in restraints.  Patient then began asking for \"2 budweiser's and a handful of valium\" as well as another incomprehensible request.  Patient still very agitated and kicking at footboard despite restraints.  Yelling and agitated.  Dr Perez paged again.  Patient to be intubated to protect airway, especially due to tongue edema.  Patient was then placed in the proper position and medications given per MAR and MD order.  Patient on 75mcg propofol per MD order and still intermittently agitated and pulling at restraints.   "

## 2017-07-04 NOTE — PROGRESS NOTES
"    [facility] Medicine Services  INPATIENT PROGRESS NOTE    Length of Stay: 0  Date of Admission: 7/3/2017  Primary Care Physician: Linda Hoffman, DNP, APRN    Subjective   Chief Complaint: PRES  HPI   He seems to have developed a bit of a problem with mild alcohol withdrawal overnight.  The patient told me yesterday that he does drink beer, but likely minimized his intake.  He gave a higher level of alcohol intake to Dr. campbell on his interview.  His mother is at the bedside today and does admit that he drinks beer on a regular basis.  She tells me that she has noticed that he has \"not been right\" for at least a week.  She tells me that he has exhibited confusion and strange behavior.  I explained to her that he could have been having problems with encephalopathy related to malignant hypertension and PRES.     Review of Systems   All pertinent negatives and positives are as above. All other systems have been reviewed and are negative unless otherwise stated.     Objective    Temp:  [98.4 °F (36.9 °C)-99.7 °F (37.6 °C)] 99.4 °F (37.4 °C)  Heart Rate:  [] 98  Resp:  [18-22] 18  BP: (133-191)/() 150/100  Physical Exam  Constitutional: He is oriented to person, place, and time. He appears well-developed and well-nourished. Discussed with his nurse, Shorty.  He currently is having his echocardiogram done.  His mother is at the bedside and questions were answered with her.  Head: Normocephalic and atraumatic.   Significant bruising to his tongue as well as a sublingual hematoma. No worse. He does not appear to have any trouble handling secretions at this point in time.   Eyes: Conjunctivae and EOM are normal. Pupils are equal, round, and reactive to light.   Neck: Neck supple. No JVD present. No tracheal deviation present. No thyromegaly present.   Cardiovascular: Normal rate, regular rhythm, normal heart sounds and intact distal pulses. Exam reveals no gallop and no " friction rub. No murmur heard.  Pulmonary/Chest: Effort normal and breath sounds normal. No stridor. No respiratory distress. He has no wheezes. He has no rales. He exhibits no tenderness.   Abdominal: Soft. Bowel sounds are normal. He exhibits no distension. There is no tenderness. There is no rebound and no guarding.   Musculoskeletal: Normal range of motion. He exhibits edema (1+). He exhibits no tenderness or deformity. Neurological: He is alert and oriented to person, place, and time. He displays normal reflexes. No cranial nerve deficit. He exhibits normal muscle tone. A bit tremulous with no confusion. Boisterous.   Skin: Skin is warm and dry. No rash noted.   Psychiatric: He has a normal mood and affect. His behavior is normal. Judgment and thought content normal.     Results Review:  I have reviewed the labs, radiology results, and diagnostic studies.    Laboratory Data:   Repeat BMP and magnesium level now.    I have reviewed the patient current medications.     Assessment/Plan   Assessment:   1. Possible angioedema related to lisinopril.  2. Malignant hypertension.  3. Possible new onset seizure disorder versus convulsive syncope.  4. Posterior reversible encephalopathy syndrome on MRI.  5. Tongue bite with sublingual hematoma.  6. Tobacco abuse.  7. Obstructive sleep apnea, noncompliant with device.  8. Gouty arthritis.  9. Daily alcohol use with minor withdrawal symptoms.  10. Hypokalemia.      Plan:  Recheck BMP and magnesium now.    Case discussed with Dr. Campos from neurology. He does have findings consistent with PRES on his MRI. He started the patient on Keppra last night.  No current need for EEG.  Continue to treat the patient's blood pressure conservatively and become more aggressive over the next few days.  He currently is not on Cardene, but has a blood pressure 179/113.  I would like to restart him back on his home metoprolol.  Continue to hold his other antihypertensives.  Recommend not being  placed back on lisinopril.     I consulted otolaryngology yesterday to evaluate his tongue hematoma and sublingual hematoma.  I had initially ordered steroids, but they discontinued them at this point in time we will just watch his progress.      Scheduled oral Valium with as needed IV Ativan.  Multivitamin and thiamine replacement.  Nicotine replacement.     SCDs for DVT prophylaxis.    Remain in the CCU for now.    Discharge Planning: I expect patient to be discharged to home in 2-3 days.    Jairon Perez DO   07/04/17   9:10 AM

## 2017-07-04 NOTE — PLAN OF CARE
Problem: Patient Care Overview (Adult)  Goal: Plan of Care Review  Outcome: Ongoing (interventions implemented as appropriate)    07/04/17 0643   Coping/Psychosocial Response Interventions   Plan Of Care Reviewed With patient   Patient Care Overview   Progress improving   Outcome Evaluation   Outcome Summary/Follow up Plan Increasingly restless throughout shift. VSS on cardene. Keppra for seizure prophylaxis. Continue to monitor for withdrawal.         Problem: Seizure Disorder/Epilepsy (Adult)  Goal: Signs and Symptoms of Listed Potential Problems Will be Absent or Manageable (Seizure Disorder/Epilepsy)  Outcome: Ongoing (interventions implemented as appropriate)    Problem: Fall Risk (Adult)  Goal: Identify Related Risk Factors and Signs and Symptoms  Outcome: Outcome(s) achieved Date Met:  07/04/17  Goal: Absence of Falls  Outcome: Ongoing (interventions implemented as appropriate)    Problem: Pressure Ulcer Risk (Roman Scale) (Adult,Obstetrics,Pediatric)  Goal: Identify Related Risk Factors and Signs and Symptoms  Outcome: Ongoing (interventions implemented as appropriate)  Goal: Skin Integrity  Outcome: Ongoing (interventions implemented as appropriate)

## 2017-07-05 ENCOUNTER — APPOINTMENT (OUTPATIENT)
Dept: GENERAL RADIOLOGY | Facility: HOSPITAL | Age: 52
End: 2017-07-05

## 2017-07-05 LAB
ANION GAP SERPL CALCULATED.3IONS-SCNC: 10 MMOL/L (ref 4–13)
ARTERIAL PATENCY WRIST A: ABNORMAL
ARTERIAL PATENCY WRIST A: ABNORMAL
ATMOSPHERIC PRESS: ABNORMAL MMHG
ATMOSPHERIC PRESS: ABNORMAL MMHG
BASE EXCESS BLDA CALC-SCNC: 0.4 MMOL/L (ref -2–2)
BASE EXCESS BLDA CALC-SCNC: 2 MMOL/L (ref -2–2)
BDY SITE: ABNORMAL
BDY SITE: ABNORMAL
BUN BLD-MCNC: 16 MG/DL (ref 5–21)
BUN/CREAT SERPL: 14.5 (ref 7–25)
CALCIUM SPEC-SCNC: 9.1 MG/DL (ref 8.4–10.4)
CHLORIDE SERPL-SCNC: 105 MMOL/L (ref 98–110)
CO2 SERPL-SCNC: 28 MMOL/L (ref 24–31)
CREAT BLD-MCNC: 1.1 MG/DL (ref 0.5–1.4)
DEPRECATED RDW RBC AUTO: 49.1 FL (ref 40–54)
ERYTHROCYTE [DISTWIDTH] IN BLOOD BY AUTOMATED COUNT: 14.7 % (ref 12–15)
GFR SERPL CREATININE-BSD FRML MDRD: 71 ML/MIN/1.73
GLUCOSE BLD-MCNC: 96 MG/DL (ref 70–100)
HCO3 BLDA-SCNC: 25.1 MMOL/L (ref 22–26)
HCO3 BLDA-SCNC: 26.9 MMOL/L (ref 22–26)
HCT VFR BLD AUTO: 38.1 % (ref 40–52)
HGB BLD-MCNC: 12.8 G/DL (ref 14–18)
HOROWITZ INDEX BLD+IHG-RTO: 30 %
HOROWITZ INDEX BLD+IHG-RTO: 50 %
MCH RBC QN AUTO: 30.5 PG (ref 28–32)
MCHC RBC AUTO-ENTMCNC: 33.6 G/DL (ref 33–36)
MCV RBC AUTO: 90.9 FL (ref 82–95)
MODALITY: ABNORMAL
MODALITY: ABNORMAL
PCO2 BLDA: 40.7 MM HG (ref 35–45)
PCO2 BLDA: 43 MM HG (ref 35–45)
PEEP RESPIRATORY: 5 CM[H2O]
PEEP RESPIRATORY: 5 CM[H2O]
PH BLDA: 7.41 PH UNITS (ref 7.35–7.45)
PH BLDA: 7.41 PH UNITS (ref 7.35–7.45)
PLATELET # BLD AUTO: 246 10*3/MM3 (ref 130–400)
PMV BLD AUTO: 9.6 FL (ref 6–12)
PO2 BLDA: 101.5 MM HG (ref 80–100)
PO2 BLDA: 86.5 MM HG (ref 80–100)
POTASSIUM BLD-SCNC: 3.7 MMOL/L (ref 3.5–5.3)
RBC # BLD AUTO: 4.19 10*6/MM3 (ref 4.8–5.9)
SAO2 % BLDCOA: 96.6 % (ref 94–100)
SAO2 % BLDCOA: 96.6 % (ref 94–100)
SAO2 % BLDCOA: 97.7 % (ref 94–100)
SAO2 % BLDCOA: 97.7 % (ref 94–100)
SODIUM BLD-SCNC: 143 MMOL/L (ref 135–145)
TOTAL RATE: 10 BREATHS/MINUTE
TOTAL RATE: 10 BREATHS/MINUTE
VENT CPAP/PEEP: 5
VENT CPAP/PEEP: 5
VENTILATOR MODE: ABNORMAL
VENTILATOR MODE: ABNORMAL
WBC NRBC COR # BLD: 15.95 10*3/MM3 (ref 4.8–10.8)

## 2017-07-05 PROCEDURE — 94770: CPT

## 2017-07-05 PROCEDURE — 25010000002 LORAZEPAM PER 2 MG: Performed by: INTERNAL MEDICINE

## 2017-07-05 PROCEDURE — 80048 BASIC METABOLIC PNL TOTAL CA: CPT | Performed by: INTERNAL MEDICINE

## 2017-07-05 PROCEDURE — 82803 BLOOD GASES ANY COMBINATION: CPT

## 2017-07-05 PROCEDURE — 85027 COMPLETE CBC AUTOMATED: CPT | Performed by: INTERNAL MEDICINE

## 2017-07-05 PROCEDURE — 94799 UNLISTED PULMONARY SVC/PX: CPT

## 2017-07-05 PROCEDURE — 25010000002 LEVETIRACETAM IN NACL 0.82% 500 MG/100ML SOLUTION: Performed by: PSYCHIATRY & NEUROLOGY

## 2017-07-05 PROCEDURE — 36600 WITHDRAWAL OF ARTERIAL BLOOD: CPT

## 2017-07-05 PROCEDURE — 94003 VENT MGMT INPAT SUBQ DAY: CPT

## 2017-07-05 PROCEDURE — 74000 HC ABDOMEN KUB: CPT

## 2017-07-05 PROCEDURE — 99231 SBSQ HOSP IP/OBS SF/LOW 25: CPT | Performed by: OTOLARYNGOLOGY

## 2017-07-05 PROCEDURE — 25010000002 PROPOFOL 1000 MG/ML EMULSION: Performed by: INTERNAL MEDICINE

## 2017-07-05 PROCEDURE — 25010000002 THIAMINE PER 100 MG: Performed by: INTERNAL MEDICINE

## 2017-07-05 PROCEDURE — 71010 HC CHEST PA OR AP: CPT

## 2017-07-05 PROCEDURE — 63710000001 FLINTSTONES COMPLETE 60 MG CHEWABLE TABLET: Performed by: INTERNAL MEDICINE

## 2017-07-05 PROCEDURE — 94760 N-INVAS EAR/PLS OXIMETRY 1: CPT

## 2017-07-05 PROCEDURE — 99232 SBSQ HOSP IP/OBS MODERATE 35: CPT | Performed by: PSYCHIATRY & NEUROLOGY

## 2017-07-05 RX ORDER — NALOXONE HCL 0.4 MG/ML
0.4 VIAL (ML) INJECTION
Status: DISCONTINUED | OUTPATIENT
Start: 2017-07-05 | End: 2017-07-18

## 2017-07-05 RX ORDER — METOPROLOL TARTRATE 50 MG/1
50 TABLET, FILM COATED ORAL EVERY 12 HOURS SCHEDULED
Status: DISCONTINUED | OUTPATIENT
Start: 2017-07-05 | End: 2017-07-11

## 2017-07-05 RX ORDER — LORAZEPAM 2 MG/ML
2 INJECTION INTRAMUSCULAR EVERY 4 HOURS PRN
Status: DISCONTINUED | OUTPATIENT
Start: 2017-07-05 | End: 2017-07-06

## 2017-07-05 RX ORDER — DIAZEPAM 10 MG/1
10 TABLET ORAL EVERY 8 HOURS SCHEDULED
Status: DISCONTINUED | OUTPATIENT
Start: 2017-07-05 | End: 2017-07-06

## 2017-07-05 RX ADMIN — NICARDIPINE HYDROCHLORIDE 5 MG/HR: 25 INJECTION INTRAVENOUS at 18:57

## 2017-07-05 RX ADMIN — ALBUTEROL SULFATE 2.5 MG: 2.5 SOLUTION RESPIRATORY (INHALATION) at 19:19

## 2017-07-05 RX ADMIN — FAMOTIDINE 20 MG: 10 INJECTION INTRAVENOUS at 20:17

## 2017-07-05 RX ADMIN — NICOTINE 1 PATCH: 14 PATCH, EXTENDED RELEASE TRANSDERMAL at 16:39

## 2017-07-05 RX ADMIN — METOPROLOL TARTRATE 50 MG: 50 TABLET ORAL at 12:25

## 2017-07-05 RX ADMIN — FAMOTIDINE 20 MG: 10 INJECTION INTRAVENOUS at 08:42

## 2017-07-05 RX ADMIN — THIAMINE HYDROCHLORIDE 100 MG: 100 INJECTION, SOLUTION INTRAMUSCULAR; INTRAVENOUS at 09:50

## 2017-07-05 RX ADMIN — NICARDIPINE HYDROCHLORIDE 2.5 MG/HR: 25 INJECTION INTRAVENOUS at 02:43

## 2017-07-05 RX ADMIN — LEVETIRACETAM 500 MG: 5 INJECTION INTRAVENOUS at 20:17

## 2017-07-05 RX ADMIN — PROPOFOL 75 MCG/KG/MIN: 10 INJECTION, EMULSION INTRAVENOUS at 07:39

## 2017-07-05 RX ADMIN — PROPOFOL 75 MCG/KG/MIN: 10 INJECTION, EMULSION INTRAVENOUS at 20:47

## 2017-07-05 RX ADMIN — ALBUTEROL SULFATE 2.5 MG: 2.5 SOLUTION RESPIRATORY (INHALATION) at 06:43

## 2017-07-05 RX ADMIN — PROPOFOL 75 MCG/KG/MIN: 10 INJECTION, EMULSION INTRAVENOUS at 01:55

## 2017-07-05 RX ADMIN — DIAZEPAM 10 MG: 10 TABLET ORAL at 13:50

## 2017-07-05 RX ADMIN — FAMOTIDINE 20 MG: 10 INJECTION INTRAVENOUS at 01:20

## 2017-07-05 RX ADMIN — PROPOFOL 75 MCG/KG/MIN: 10 INJECTION, EMULSION INTRAVENOUS at 04:41

## 2017-07-05 RX ADMIN — LORAZEPAM 2 MG: 2 INJECTION INTRAMUSCULAR; INTRAVENOUS at 04:40

## 2017-07-05 RX ADMIN — ALBUTEROL SULFATE 2.5 MG: 2.5 SOLUTION RESPIRATORY (INHALATION) at 12:01

## 2017-07-05 RX ADMIN — LORAZEPAM 2 MG: 2 INJECTION INTRAMUSCULAR; INTRAVENOUS at 02:44

## 2017-07-05 RX ADMIN — CHLORHEXIDINE GLUCONATE 15 ML: 1.2 RINSE ORAL at 11:18

## 2017-07-05 RX ADMIN — LORAZEPAM 2 MG: 2 INJECTION INTRAMUSCULAR; INTRAVENOUS at 00:37

## 2017-07-05 RX ADMIN — ALBUTEROL SULFATE 2.5 MG: 2.5 SOLUTION RESPIRATORY (INHALATION) at 00:08

## 2017-07-05 RX ADMIN — PROPOFOL 75 MCG/KG/MIN: 10 INJECTION, EMULSION INTRAVENOUS at 16:53

## 2017-07-05 RX ADMIN — PROPOFOL 75 MCG/KG/MIN: 10 INJECTION, EMULSION INTRAVENOUS at 09:49

## 2017-07-05 RX ADMIN — PROPOFOL 75 MCG/KG/MIN: 10 INJECTION, EMULSION INTRAVENOUS at 18:34

## 2017-07-05 RX ADMIN — LORAZEPAM 2 MG: 2 INJECTION INTRAMUSCULAR; INTRAVENOUS at 06:43

## 2017-07-05 RX ADMIN — PROPOFOL 75 MCG/KG/MIN: 10 INJECTION, EMULSION INTRAVENOUS at 23:48

## 2017-07-05 RX ADMIN — DIAZEPAM 10 MG: 10 TABLET ORAL at 22:21

## 2017-07-05 RX ADMIN — LORAZEPAM 2 MG: 2 INJECTION INTRAMUSCULAR; INTRAVENOUS at 18:43

## 2017-07-05 RX ADMIN — LEVETIRACETAM 500 MG: 5 INJECTION INTRAVENOUS at 08:35

## 2017-07-05 RX ADMIN — ACETAMINOPHEN 400 MCG: 400 TABLET ORAL at 12:25

## 2017-07-05 RX ADMIN — Medication 1 TABLET: at 12:25

## 2017-07-05 RX ADMIN — METOPROLOL TARTRATE 50 MG: 50 TABLET ORAL at 20:17

## 2017-07-05 RX ADMIN — LORAZEPAM 2 MG: 2 INJECTION INTRAMUSCULAR; INTRAVENOUS at 12:51

## 2017-07-05 RX ADMIN — NICARDIPINE HYDROCHLORIDE 5 MG/HR: 25 INJECTION INTRAVENOUS at 11:17

## 2017-07-05 RX ADMIN — PROPOFOL 75 MCG/KG/MIN: 10 INJECTION, EMULSION INTRAVENOUS at 11:18

## 2017-07-05 NOTE — SIGNIFICANT NOTE
On vent.  Will d/c from ST. Reconsult when extubated.  Daniela Bentley, MS CCC-SLP 7/5/2017 9:28 AM     07/05/17 0927   Time Calculation- SLP   SLP Start Time 0920   SLP Stop Time 0928   SLP Time Calculation (min) 8 min   SLP Received On 07/05/17

## 2017-07-05 NOTE — PROGRESS NOTES
[facility] Medicine Services  INPATIENT PROGRESS NOTE    Length of Stay: 1  Date of Admission: 7/3/2017  Primary Care Physician: Linda Hoffman, DNP, APRN    Subjective   Chief Complaint: Alcohol withdrawal  HPI   He required intubation yesterday afternoon.  Please see separate documentation.    He has required max propofol and recurrent benzodiazepine administration throughout the course of the night to calm.  Dr. Campos saw him this morning with the propofol on cause and felt the patient followed commands, but still very agitated and tremulous.    Review of Systems   Unable to assess secondary to the patient's intubated and sedated state.     Objective    Temp:  [98.5 °F (36.9 °C)-98.9 °F (37.2 °C)] 98.5 °F (36.9 °C)  Heart Rate:  [0-103] 67  Resp:  [12-20] 19  BP: (126-181)/() 135/95  FiO2 (%):  [30 %-60 %] 30 %  Physical Exam   Constitutional: No distress.   Seen and discussed with his nurse, Rehana. No family present. He is calm and sedate. ETT secure, PIVs.    HENT:   Head: Normocephalic and atraumatic.   Eyes: Pupils are equal, round, and reactive to light.   Neck: Neck supple. No JVD present.   Cardiovascular: Normal rate and regular rhythm.    Pulmonary/Chest: Effort normal and breath sounds normal.   Ventilated.    Abdominal: Soft. Bowel sounds are normal.   Musculoskeletal: He exhibits edema (trace).   Neurological:   Currently he is sedated with propofol and Ativan.  Dr. Campos examined him and felt him to be appropriate neurologically   Skin: Skin is warm and dry.       Intake/Output Summary (Last 24 hours) at 07/05/17 0756  Last data filed at 07/05/17 0600   Gross per 24 hour   Intake           1117.7 ml   Output             1425 ml   Net           -307.3 ml     Results Review:  I have reviewed the labs, radiology results, and diagnostic studies.    Laboratory Data:     Results from last 7 days  Lab Units 07/05/17  0158 07/03/17  0807   WBC 10*3/mm3 15.95*  11.95*   HEMOGLOBIN g/dL 12.8* 11.9*   HEMATOCRIT % 38.1* 34.1*   PLATELETS 10*3/mm3 246 245       Results from last 7 days  Lab Units 07/05/17  0158 07/04/17  0937 07/03/17  0807   SODIUM mmol/L 143 142 141   POTASSIUM mmol/L 3.7 3.8 3.0*   CHLORIDE mmol/L 105 105 102   CO2 mmol/L 28.0 26.0 29.0   BUN mg/dL 16 12 9   CREATININE mg/dL 1.10 1.13 1.19   CALCIUM mg/dL 9.1 9.1 8.8   BILIRUBIN mg/dL  --   --  0.6   ALK PHOS U/L  --   --  69   ALT (SGPT) U/L  --   --  39   AST (SGOT) U/L  --   --  72*   GLUCOSE mg/dL 96 126* 127*   Magnesium 2.0.     Radiology Data:   Imaging Results (last 24 hours)     Procedure Component Value Units Date/Time    XR Chest 1 View [[id number]] Collected:  07/04/17 1543     Updated:  07/04/17 1548    Narrative:       EXAMINATION: XR CHEST 1 VW- 7/4/2017 14:43 CST     HISTORY: Intubation     Comparison: None     Findings:  Endotracheal tube is in place with tip approximately 3 cm above the  jolly. Low lung volumes are present bilaterally. There is atelectasis  at the left lung base. The lung bases otherwise appear clear. The  pulmonary vasculature appears normal.       Impression:       Impression:  1. Apparent appropriate endotracheal tube position.  2. Left basilar atelectasis.  This report was finalized on 07/04/2017 15:45 by Dr. Driss Ward MD.    XR Chest 1 View [[id number]] Collected:  07/05/17 0724     Updated:  07/05/17 0728    Narrative:       Frontal supine radiograph of the chest 7/5/2017 3:10 AM CDT     History: Intubated Patient; R13.12-Dysphagia, oropharyngeal phase;  R56.9-Unspecified convulsions; T78.3XXS-Angioneurotic edema, sequela;  F10.231-Alcohol dependence with withdrawal delirium     Comparison: 07/04/2017      Findings:   Lines and tubes are stable in position. No new opacities or  pneumothoraces are visualized in the chest. The cardiomediastinal  silhouette and pulmonary vascularity are unchanged.       No acute osseous or soft tissue abnormality is noted.         Impression:       Impression:   1. No significant interval change since previous exam.        This report was finalized on 07/05/2017 07:25 by Dr. Dilshad Silver MD.        ECHO Interpretation Summary   · Hyperdyamic LVEF (>70%).  · Left ventricular wall thickness is consistent with borderline concentric hypertrophy.  · Right ventricular cavity is moderately dilated.  · Insufficient TR velocity profile to estimate the right ventricular systolic pressure.  · No significant valvular pathology.           Results from last 7 days  Lab Units 07/05/17  0322   PH, ARTERIAL pH units 7.414   PO2 ART mm Hg 86.5   PCO2, ARTERIAL mm Hg 43.0   HCO3 ART mmol/L 26.9*   Vent Mode: VC/AC  FiO2 (%):  [30 %-60 %] 30 %  S RR:  [10] 10  PEEP/CPAP (cm H2O):  [5 cm H20-10 cm H20] 5 cm H20  HI SUP:  [0 cm H20-5 cm H20] 0 cm H20  MAP (cm H2O):  [0-15] 9.6    I have reviewed the patient current medications.     Assessment/Plan   Assessment:   1. Possible angioedema related to lisinopril.  2. Malignant hypertension.  3. Possible new onset seizure disorder versus convulsive syncope.  4. Posterior reversible encephalopathy syndrome on MRI.  5. Tongue bite with sublingual hematoma.  6. Tobacco abuse.  7. Obstructive sleep apnea, noncompliant with device.  8. Gouty arthritis.  9. Daily alcohol use with Delirium tremens requiring sedation and mechanical ventilation.  10. Hypokalemia.       Plan:  He experienced worsening alcohol withdrawal throughout the course the day yesterday.  Ultimately elected to intubate the patient after discussing the case with his daughter.  He requires propofol and as needed Ativan at this point in time.  I like to place a nasogastric tube or a Dobbhoff today and continue scheduled Valium to limit the use of IV Ativan.     Case discussed with Dr. Campos from neurology. He does have findings consistent with PRES on his MRI. He started the patient on Keppra on 7/3. No current need for EEG. Continue to treat the patient's  blood pressure conservatively and become more aggressive over the next few days. He has been on and off of Cardene. To be back on metoprolol. Continue to hold his other antihypertensives. Recommend not being placed back on lisinopril.      Ootolaryngology evaluated his tongue hematoma and sublingual hematoma.  I had initially ordered steroids, but they discontinued them at this point in time. We will just watch his progress.      Multivitamin and thiamine replacement. Nicotine replacement.     SCDs for DVT prophylaxis.  We did not use Lovenox because of his tongue hematoma.     Plan for him to remain ventilated and sedated for at least another 24-48 hours to give his delirium tremens time to subside.    Jairon Perez, DO   07/05/17   7:45 AM     Approximately 35 minutes of critical care time were spent managing the patient exclusive of billable procedures. I will continue to manage ventilatory support for now.

## 2017-07-05 NOTE — PLAN OF CARE
Problem: Patient Care Overview (Adult)  Goal: Plan of Care Review  Outcome: Ongoing (interventions implemented as appropriate)    07/05/17 0509   Coping/Psychosocial Response Interventions   Plan Of Care Reviewed With patient   Patient Care Overview   Progress no change   Outcome Evaluation   Outcome Summary/Follow up Plan Patient continues to be restless through the shift. Sedation at max and PRN ativan given every two hours. Cardene still keeping BP in goal range of diastolic below 100. FiO2 turned down to 30%. Patient tolerated well.        Goal: Adult Individualization and Mutuality  Outcome: Ongoing (interventions implemented as appropriate)  Goal: Discharge Needs Assessment  Outcome: Ongoing (interventions implemented as appropriate)    Problem: Seizure Disorder/Epilepsy (Adult)  Goal: Signs and Symptoms of Listed Potential Problems Will be Absent or Manageable (Seizure Disorder/Epilepsy)  Outcome: Ongoing (interventions implemented as appropriate)    Problem: Fall Risk (Adult)  Goal: Absence of Falls  Outcome: Ongoing (interventions implemented as appropriate)    Problem: Pressure Ulcer Risk (Roman Scale) (Adult,Obstetrics,Pediatric)  Goal: Identify Related Risk Factors and Signs and Symptoms  Outcome: Ongoing (interventions implemented as appropriate)  Goal: Skin Integrity  Outcome: Ongoing (interventions implemented as appropriate)    Problem: SAFETY - NON-VIOLENT RESTRAINT  Goal: Remains free of injury from restraints (Non-Violent Restraint)  Outcome: Ongoing (interventions implemented as appropriate)  Goal: Free from restraint(s) (Non-Violent Restraint)  Outcome: Ongoing (interventions implemented as appropriate)

## 2017-07-05 NOTE — PROGRESS NOTES
Neurology Progress Note      Chief Complaint:  PRES, Seizure, DTs    Subjective     Subjective:    Nurse notes that daughter stated he drinks about a case a day.  He developed severe DT's yesterday, no seizure activity but very combative, BPs increasing, was intubated for patient safety.  Requiring large doses of propofol and Ativan to maintain sedation.  Medications:  Current Facility-Administered Medications   Medication Dose Route Frequency Provider Last Rate Last Dose   • acetaminophen (TYLENOL) tablet 650 mg  650 mg Oral Q4H PRN Jairon Perez DO   650 mg at 07/03/17 2010   • albuterol (PROVENTIL) nebulizer solution 0.083% 2.5 mg/3mL  2.5 mg Nebulization Q6H - RT Jairon Perez DO   2.5 mg at 07/05/17 0643   • chlorhexidine (PERIDEX) 0.12 % solution 15 mL  15 mL Mouth/Throat Q12H Jairon Perez DO   15 mL at 07/04/17 2032   • diazePAM (VALIUM) tablet 10 mg  10 mg Oral Q8H Jairon Perez DO   10 mg at 07/04/17 1342   • famotidine (PEPCID) injection 20 mg  20 mg Intravenous Q12H Jairon Perez DO   20 mg at 07/05/17 0120   • flintstones complete (FLINTSTONES) chewable tablet 1 tablet  1 tablet Oral Daily Jairon Perez DO       • folic acid (FOLVITE) tablet 400 mcg  400 mcg Oral Daily Royal Campos MD       • HYDROcodone-acetaminophen (NORCO)  MG per tablet 1 tablet  1 tablet Oral Q4H PRN Jairon Perez DO   1 tablet at 07/03/17 1758   • HYDROcodone-acetaminophen (NORCO) 5-325 MG per tablet 1 tablet  1 tablet Oral Q4H PRN Jairon Perez DO       • HYDROmorphone (DILAUDID) injection 0.5 mg  0.5 mg Intravenous Q3H PRN Jairon Perez DO        And   • naloxone (NARCAN) injection 0.4 mg  0.4 mg Intravenous Q5 Min PRN Jairon Perez DO       • levETIRAcetam in NaCl 0.82% (KEPPRA) IVPB 500 mg  500 mg Intravenous Q12H Royal Campos MD   500 mg at 07/04/17 2034   • LORazepam (ATIVAN) injection 2 mg  2 mg Intravenous Q2H PRN Jairon Perez DO   2 mg at 07/05/17 0643   • metoprolol tartrate  (LOPRESSOR) tablet 50 mg  50 mg Oral Q12H Jairon Perez, DO   50 mg at 07/04/17 1018   • niCARdipine (CARDENE) 25 mg/250 mL (0.1 mg/mL) 0.9% NS infusion  5-15 mg/hr Intravenous Titrated Jairon Perez DO 25 mL/hr at 07/05/17 0243 2.5 mg/hr at 07/05/17 0243   • nicotine (NICODERM CQ) 14 MG/24HR patch 1 patch  1 patch Transdermal Q24H Jairon Perez DO   1 patch at 07/04/17 1749   • ondansetron (ZOFRAN) injection 4 mg  4 mg Intravenous Q6H PRN Jairon Perez DO       • propofol (DIPRIVAN) infusion 10 mg/mL 100 mL  5-50 mcg/kg/min Intravenous Titrated Jairon Perez DO 37.3 mL/hr at 07/05/17 0441 75 mcg/kg/min at 07/05/17 0441   • sodium chloride 0.9 % flush 1-10 mL  1-10 mL Intravenous PRN Jairon Perez DO       • thiamine (B-1) 100 mg in sodium chloride 0.9 % 100 mL IVPB  100 mg Intravenous Daily Jairon Perez  mL/hr at 07/04/17 1829 100 mg at 07/04/17 1829       Review of Systems:   -Unable to obtain. Sedated      Objective      Vital Signs  Temp:  [98.5 °F (36.9 °C)-98.9 °F (37.2 °C)] 98.5 °F (36.9 °C)  Heart Rate:  [0-103] 67  Resp:  [12-20] 19  BP: (126-181)/() 135/95  FiO2 (%):  [30 %-60 %] 30 %    Physical Exam:  Off sedation:    Awakens  CTAB  RRR  Abdomen distended, NT  Ext:  No edema    Neuro:  Awake, opens eyes to command, voice  Follows commands  Restrained but actively pulling against restraints X 4, symmetric  DTR:  Difficult to assess, 2+ in P/BC bilaterally, W/D Babinski  Withdraws to pain X 4  PERRL, EOMi, Positive Dolls, positive Gag     Results Review:    I reviewed the patient's new clinical results.      Results from last 7 days  Lab Units 07/05/17  0158 07/03/17  0807   WBC 10*3/mm3 15.95* 11.95*   HEMOGLOBIN g/dL 12.8* 11.9*   HEMATOCRIT % 38.1* 34.1*   PLATELETS 10*3/mm3 246 245          Results from last 7 days  Lab Units 07/05/17  0158 07/04/17  0937 07/03/17  0807   SODIUM mmol/L 143 142 141   POTASSIUM mmol/L 3.7 3.8 3.0*   CHLORIDE mmol/L 105 105 102   CO2 mmol/L 28.0  26.0 29.0   BUN mg/dL 16 12 9   CREATININE mg/dL 1.10 1.13 1.19   CALCIUM mg/dL 9.1 9.1 8.8   BILIRUBIN mg/dL  --   --  0.6   ALK PHOS U/L  --   --  69   ALT (SGPT) U/L  --   --  39   AST (SGOT) U/L  --   --  72*   GLUCOSE mg/dL 96 126* 127*        Lab Results   Component Value Date    MG 2.0 07/04/2017     No components found for: POCGLUC  No components found for: A1C  Lab Results   Component Value Date    HDL 36 02/04/2016     No components found for: B12  No results found for: TSH     Echo results:  Interpretation Summary      · Hyperdyamic LVEF (>70%).  · Left ventricular wall thickness is consistent with borderline concentric hypertrophy.  · Right ventricular cavity is moderately dilated.  · Insufficient TR velocity profile to estimate the right ventricular systolic pressure.  · No significant valvular pathology.         Assessment/Plan     Hospital Problem List    Active Problems:    Angio-edema    Seizures    Impression  52-year-old gentleman who has a history of hypertension, admitted EtOH use (reported case/day), admitted with PRES, seizure, now DTs.   Also bit his tongue and has some swelling, may have developed angioedema prior to the PRES.      Plan  1. PRES  --DBPs under 100 overnight, continue Cardene.  Slowly lower BPs over the next 24-48 hours  --titrate Cardene as necessary.   --Continue Keppra. No evidence of seizures.  Will be on this until he has a clear MRI as o/p in 4-6 weeks.  --Plan to repeat imaging when stable or if condition worsens.  His neuro exam is stable.     2. DTs  --Continue Ativan, propofol  --Now intubated and sedated.  He was too agitated and required intubation and sedation.   --Worried about protection of airway   --Agitation was causing significant rise in blood pressure     3. Tongue swelling:  Angioedema ENT following          Royal Campos MD  07/05/17  7:32 AM

## 2017-07-05 NOTE — PLAN OF CARE
Problem: Patient Care Overview (Adult)  Goal: Plan of Care Review  Outcome: Ongoing (interventions implemented as appropriate)    07/05/17 0926   Outcome Evaluation   Outcome Summary/Follow up Plan Pt on vent. Will d/c from ST. Reconsult when extubated.         Problem: Inpatient SLP  Goal: Dysphagia- Patient will safely consume diet as per recommendation with no signs/symptoms of aspiration  Outcome: Unable to achieve outcome(s) by discharge Date Met:  07/05/17 07/03/17 1027 07/05/17 0926   Safely Consume Diet   Safely Consume Diet- SLP, Date Established 07/03/17 --    Safely Consume Diet- SLP, Time to Achieve by discharge --    Safely Consume Diet- SLP, Additional Goal Pt will tolerate recommended diet as well as trials of upgraded diet consistencies w/o any overt s/s of aspiration. --    Safely Consume Diet- SLP, Date Goal Reviewed --  07/05/17   Safely Consume Diet- SLP, Outcome --  goal no longer appropriate   Safely Consume Diet- SLP, Reason Goal Not Met --  medical status inhibits participation

## 2017-07-05 NOTE — PROGRESS NOTES
Discharge Planning Assessment  [facility]     Patient Name: Nishant Savage  MRN: [id number]  Today's Date: 7/5/2017    Admit Date: 7/3/2017          Discharge Needs Assessment       07/05/17 0824    Living Environment    Lives With alone    Living Arrangements house    Transportation Available car;family or friend will provide    Living Environment    Provides Primary Care For no one    Quality Of Family Relationships supportive    Able to Return to Prior Living Arrangements yes    Discharge Needs Assessment    Concerns To Be Addressed discharge planning concerns;substance/tobacco abuse/use concerns;financial/insurance concerns    Readmission Within The Last 30 Days no previous admission in last 30 days    Anticipated Changes Related to Illness inability to care for self    Equipment Currently Used at Home none    Current Discharge Risk financial support inadequate;substance abuse    Discharge Planning Comments Patient admitted from home where he resides alone.  Patient is currently in ICU on vent.  Noted patient has been screened by [facility] and is eligible for [place] Medicaid which is pending.  Patient will be followed closely during hospitalization.  When patient is able to communicate he will be provided with chemical dependency packet and offered assistance with substance abuse treatment options.   Patient does have a PCP.  If [place] Medicaid is still pending at time of discharge patient may require assistance with discharge medications.            Discharge Plan     None        Discharge Placement     No information found        Expected Discharge Date and Time     Expected Discharge Date Expected Discharge Time    Jul 7, 2017               Demographic Summary     None            Functional Status     None            Psychosocial     None            Abuse/Neglect     None            Legal     None            Substance Abuse     None            Patient Forms     None          HENRIETTA Nava

## 2017-07-05 NOTE — PLAN OF CARE
Problem: Patient Care Overview (Adult)  Goal: Plan of Care Review  Outcome: Ongoing (interventions implemented as appropriate)  NG tube inserted to use for meds and tube feeds of Jevity 1.2. Pt bladder scanned (545 ml) and in and out cathed (500) removed.     07/05/17 1343 07/05/17 1711   Coping/Psychosocial Response Interventions   Plan Of Care Reviewed With --  patient;mother   Patient Care Overview   Progress --  no change   Outcome Evaluation   Outcome Summary/Follow up Plan Pt sedated on mech vent. TF consult per MD to initiate enteral feeds. Start Jevity 1.2 at 20 ml/hr for 8 hrs, then advance as tolerated every 4 hrs by 10 ml/hr, to a goal rate of 57 ml/hr . Auto flush of 25 ml/hr.  --        Goal: Adult Individualization and Mutuality  Outcome: Ongoing (interventions implemented as appropriate)  Goal: Discharge Needs Assessment  Outcome: Ongoing (interventions implemented as appropriate)    Problem: Seizure Disorder/Epilepsy (Adult)  Goal: Signs and Symptoms of Listed Potential Problems Will be Absent or Manageable (Seizure Disorder/Epilepsy)  Outcome: Ongoing (interventions implemented as appropriate)    Problem: Fall Risk (Adult)  Goal: Absence of Falls  Outcome: Ongoing (interventions implemented as appropriate)    Problem: Pressure Ulcer Risk (Roman Scale) (Adult,Obstetrics,Pediatric)  Goal: Identify Related Risk Factors and Signs and Symptoms  Outcome: Ongoing (interventions implemented as appropriate)  Goal: Skin Integrity  Outcome: Ongoing (interventions implemented as appropriate)    Problem: SAFETY - NON-VIOLENT RESTRAINT  Goal: Remains free of injury from restraints (Non-Violent Restraint)  Outcome: Ongoing (interventions implemented as appropriate)  Goal: Free from restraint(s) (Non-Violent Restraint)  Outcome: Ongoing (interventions implemented as appropriate)    Problem: Nutrition, Enteral (Adult)  Goal: Signs and Symptoms of Listed Potential Problems Will be Absent or Manageable (Nutrition,  Enteral)  Outcome: Ongoing (interventions implemented as appropriate)

## 2017-07-05 NOTE — PLAN OF CARE
Problem: Patient Care Overview (Adult)  Goal: Plan of Care Review  Outcome: Ongoing (interventions implemented as appropriate)    07/05/17 1343   Coping/Psychosocial Response Interventions   Plan Of Care Reviewed With other (see comments)  (nursing)   Patient Care Overview   Progress no change   Outcome Evaluation   Outcome Summary/Follow up Plan Pt sedated on mech vent. TF consult per MD to initiate enteral feeds. Start Jevity 1.2 at 20 ml/hr for 8 hrs, then advance as tolerated every 4 hrs by 10 ml/hr, to a goal rate of 57 ml/hr . Auto flush of 25 ml/hr.          Problem: Nutrition, Enteral (Adult)  Goal: Signs and Symptoms of Listed Potential Problems Will be Absent or Manageable (Nutrition, Enteral)  Outcome: Ongoing (interventions implemented as appropriate)

## 2017-07-05 NOTE — PROGRESS NOTES
[facility] ([facility]) Progress Note:       LOS: 1 day   Patient Care Team:  Linda Hoffman DNP, EPI as PCP - General  Linda Hoffman DNP, APRN as PCP - Family Medicine    Active consulting complaint: Angioedema    Subjective     Interval History:     Status of active consulting problem: Per Rn: tongue swelling decreasing.  Plan is to wean to extubate tomorrow.  Currently on 30% FiO2.  Patient is nonresponsive due to ventilator status.      History taken from: RN    Review of Systems:    Review of Systems   Unable to perform ROS: Intubated       Objective     Vital Signs  Temp:  [97.2 °F (36.2 °C)-98.9 °F (37.2 °C)] 98.1 °F (36.7 °C)  Heart Rate:  [] 71  Resp:  [12-19] 19  BP: (111-178)/() 151/89  FiO2 (%):  [30 %-50 %] 30 %    Physical Exam:   Physical Exam   Constitutional: No distress. He is intubated.   HENT:   Head: Normocephalic and atraumatic.   Right Ear: External ear normal.   Left Ear: External ear normal.   Ecchymotic, slightly swollen tongue.  No bleeding.   Neck: Neck supple.   Pulmonary/Chest: He is intubated.   Lymphadenopathy:     He has no cervical adenopathy.   Skin: He is not diaphoretic.        Results Review:       Lab Results (last 24 hours)     Procedure Component Value Units Date/Time    CBC (No Diff) [[id number]]  (Abnormal) Collected:  07/05/17 0158    Specimen:  Blood Updated:  07/05/17 0213     WBC 15.95 (H) 10*3/mm3      RBC 4.19 (L) 10*6/mm3      Hemoglobin 12.8 (L) g/dL      Hematocrit 38.1 (L) %      MCV 90.9 fL      MCH 30.5 pg      MCHC 33.6 g/dL      RDW 14.7 %      RDW-SD 49.1 fl      MPV 9.6 fL      Platelets 246 10*3/mm3     Blood Gas, Arterial [[id number]]  (Abnormal) Collected:  07/05/17 0224    Specimen:  Arterial Blood Updated:  07/05/17 0227     Site Arterial: right radial     Gera's Test --      Documented in Rapid Comm        pH, Arterial 7.408 pH units      pCO2, Arterial 40.7 mm Hg      pO2, Arterial 101.5 (H) mm Hg      HCO3, Arterial 25.1 mmol/L       Base Excess, Arterial 0.4 mmol/L      O2 Saturation, Arterial 97.7 %      O2 Saturation Calculated 97.7 %      Barometric Pressure for Blood Gas -- mmHg       Component not reported at this site.        Modality Ventilator     FIO2 50 %      Ventilator Mode Assist     Rate 10.0 Breaths/minute      PEEP 5.0     Vent CPAP/PEEP 5.0    Narrative:       Serial Number: [id number]    : [id number]    Basic Metabolic Panel [[id number]]  (Normal) Collected:  07/05/17 0158    Specimen:  Blood Updated:  07/05/17 0229     Glucose 96 mg/dL      BUN 16 mg/dL      Creatinine 1.10 mg/dL      Sodium 143 mmol/L      Potassium 3.7 mmol/L      Chloride 105 mmol/L      CO2 28.0 mmol/L      Calcium 9.1 mg/dL      eGFR Non African Amer 71 mL/min/1.73      BUN/Creatinine Ratio 14.5     Anion Gap 10.0 mmol/L     Narrative:       GFR Normal >60  Chronic Kidney Disease <60  Kidney Failure <15    Blood Gas, Arterial [[id number]]  (Abnormal) Collected:  07/05/17 0322    Specimen:  Arterial Blood Updated:  07/05/17 0326     Site Arterial: right radial     Gera's Test --      Documented in Rapid Comm        pH, Arterial 7.414 pH units      pCO2, Arterial 43.0 mm Hg      pO2, Arterial 86.5 mm Hg      HCO3, Arterial 26.9 (H) mmol/L      Base Excess, Arterial 2.0 mmol/L      O2 Saturation, Arterial 96.6 %      O2 Saturation Calculated 96.6 %      Barometric Pressure for Blood Gas -- mmHg       Component not reported at this site.        Modality Ventilator     FIO2 30 %      Ventilator Mode Assist     Rate 10.0 Breaths/minute      PEEP 5.0     Vent CPAP/PEEP 5.0    Narrative:       Serial Number: [id number]    : [id number]        Imaging Results (last 24 hours)     Procedure Component Value Units Date/Time    XR Chest 1 View [[id number]] Collected:  07/05/17 0724     Updated:  07/05/17 0728    Narrative:       Frontal supine radiograph of the chest 7/5/2017 3:10 AM CDT     History: Intubated Patient; R13.12-Dysphagia, oropharyngeal  phase;  R56.9-Unspecified convulsions; T78.3XXS-Angioneurotic edema, sequela;  F10.231-Alcohol dependence with withdrawal delirium     Comparison: 07/04/2017      Findings:   Lines and tubes are stable in position. No new opacities or  pneumothoraces are visualized in the chest. The cardiomediastinal  silhouette and pulmonary vascularity are unchanged.       No acute osseous or soft tissue abnormality is noted.        Impression:       Impression:   1. No significant interval change since previous exam.        This report was finalized on 07/05/2017 07:25 by Dr. Dilshad Silver MD.    XR Abdomen KUB [[id number]] Collected:  07/05/17 1207     Updated:  07/05/17 1210    Narrative:       EXAMINATION:   XR ABDOMEN KUB-  7/5/2017 12:07 PM CDT     HISTORY: NG tube placement     Single view the abdomen is obtained. Nasogastric tube is present with  the distal tip satisfactorily positioned in the fundus the stomach.  Bowel gas pattern is nonspecific.       Impression:       Satisfactory placement of nasogastric tube  This report was finalized on [id number] by Dr. Adolph Echols MD.          Medication Review:     Current Facility-Administered Medications   Medication Dose Route Frequency Provider Last Rate Last Dose   • acetaminophen (TYLENOL) tablet 650 mg  650 mg Oral Q4H PRN Jairon Perez DO   650 mg at 07/03/17 2010   • albuterol (PROVENTIL) nebulizer solution 0.083% 2.5 mg/3mL  2.5 mg Nebulization Q6H - RT Jairon Perez DO   2.5 mg at 07/05/17 1201   • chlorhexidine (PERIDEX) 0.12 % solution 15 mL  15 mL Mouth/Throat Q12H Jairon Perez DO   15 mL at 07/05/17 1118   • diazePAM (VALIUM) tablet 10 mg  10 mg Nasogastric Q8H Jairon Perez DO   10 mg at 07/05/17 1350   • famotidine (PEPCID) injection 20 mg  20 mg Intravenous Q12H Jairon Perez DO   20 mg at 07/05/17 0842   • flintstones complete (FLINTSTONES) chewable tablet 1 tablet  1 tablet Oral Daily Jairon Perez DO   1 tablet at 07/05/17 1225   • folic  acid (FOLVITE) tablet 400 mcg  400 mcg Oral Daily Royal Campos MD   400 mcg at 07/05/17 1225   • HYDROcodone-acetaminophen (NORCO)  MG per tablet 1 tablet  1 tablet Oral Q4H PRN Jairon Perez DO   1 tablet at 07/03/17 1758   • HYDROcodone-acetaminophen (NORCO) 5-325 MG per tablet 1 tablet  1 tablet Oral Q4H PRN Jairon Perez DO       • HYDROmorphone (DILAUDID) injection 1 mg  1 mg Intravenous Q3H PRN Jairon Perez DO        And   • naloxone (NARCAN) injection 0.4 mg  0.4 mg Intravenous Q5 Min PRN Jairon Perez DO       • levETIRAcetam in NaCl 0.82% (KEPPRA) IVPB 500 mg  500 mg Intravenous Q12H Royal Campos MD   500 mg at 07/05/17 0835   • LORazepam (ATIVAN) injection 2 mg  2 mg Intravenous Q4H PRN Jairon Perez DO   2 mg at 07/05/17 1251   • metoprolol tartrate (LOPRESSOR) tablet 50 mg  50 mg Nasogastric Q12H Jairon Perez DO   50 mg at 07/05/17 1225   • niCARdipine (CARDENE) 25 mg/250 mL (0.1 mg/mL) 0.9% NS infusion  5-15 mg/hr Intravenous Titrated Jairon Perez DO 25 mL/hr at 07/05/17 1405 2.5 mg/hr at 07/05/17 1405   • nicotine (NICODERM CQ) 14 MG/24HR patch 1 patch  1 patch Transdermal Q24H Jairon Perez DO   1 patch at 07/05/17 1639   • ondansetron (ZOFRAN) injection 4 mg  4 mg Intravenous Q6H PRN Jairon Perez DO       • propofol (DIPRIVAN) infusion 10 mg/mL 100 mL  5-50 mcg/kg/min Intravenous Titrated Jairon Perez DO 37.3 mL/hr at 07/05/17 1653 75 mcg/kg/min at 07/05/17 1653   • sodium chloride 0.9 % flush 1-10 mL  1-10 mL Intravenous PRN Jairon Perez DO       • thiamine (B-1) 100 mg in sodium chloride 0.9 % 100 mL IVPB  100 mg Intravenous Daily Jairon Perez,  mL/hr at 07/05/17 0950 100 mg at 07/05/17 0950       Assessment/Plan     Active Problems:    Angio-edema    Seizures      Plan is to extubate tomorrow.  Tongue swelling down.  Will sign off.  Reconsult if needed.    Jairon Pierson MD  7/5/2017  6:26 PM        Jairon Pierson MD  07/05/17  6:29  PM

## 2017-07-06 ENCOUNTER — APPOINTMENT (OUTPATIENT)
Dept: GENERAL RADIOLOGY | Facility: HOSPITAL | Age: 52
End: 2017-07-06

## 2017-07-06 LAB
ANION GAP SERPL CALCULATED.3IONS-SCNC: 7 MMOL/L (ref 4–13)
ARTERIAL PATENCY WRIST A: NORMAL
ATMOSPHERIC PRESS: NORMAL MMHG
BASE EXCESS BLDA CALC-SCNC: -0.5 MMOL/L (ref -2–2)
BDY SITE: NORMAL
BUN BLD-MCNC: 15 MG/DL (ref 5–21)
BUN/CREAT SERPL: 15.3 (ref 7–25)
CALCIUM SPEC-SCNC: 8.4 MG/DL (ref 8.4–10.4)
CHLORIDE SERPL-SCNC: 108 MMOL/L (ref 98–110)
CO2 SERPL-SCNC: 27 MMOL/L (ref 24–31)
CREAT BLD-MCNC: 0.98 MG/DL (ref 0.5–1.4)
DEPRECATED RDW RBC AUTO: 48.2 FL (ref 40–54)
ERYTHROCYTE [DISTWIDTH] IN BLOOD BY AUTOMATED COUNT: 14.7 % (ref 12–15)
GFR SERPL CREATININE-BSD FRML MDRD: 81 ML/MIN/1.73
GLUCOSE BLD-MCNC: 87 MG/DL (ref 70–100)
GLUCOSE BLDC GLUCOMTR-MCNC: 104 MG/DL (ref 70–130)
HCO3 BLDA-SCNC: 23.3 MMOL/L (ref 22–26)
HCT VFR BLD AUTO: 35.4 % (ref 40–52)
HGB BLD-MCNC: 12.1 G/DL (ref 14–18)
HOROWITZ INDEX BLD+IHG-RTO: 30 %
MAGNESIUM SERPL-MCNC: 2.1 MG/DL (ref 1.4–2.2)
MCH RBC QN AUTO: 30.7 PG (ref 28–32)
MCHC RBC AUTO-ENTMCNC: 34.2 G/DL (ref 33–36)
MCV RBC AUTO: 89.8 FL (ref 82–95)
MODALITY: NORMAL
PCO2 BLDA: 35.9 MM HG (ref 35–45)
PEEP RESPIRATORY: 5 CM[H2O]
PH BLDA: 7.43 PH UNITS (ref 7.35–7.45)
PLATELET # BLD AUTO: 216 10*3/MM3 (ref 130–400)
PMV BLD AUTO: 9.7 FL (ref 6–12)
PO2 BLDA: 95.6 MM HG (ref 80–100)
POTASSIUM BLD-SCNC: 3 MMOL/L (ref 3.5–5.3)
RBC # BLD AUTO: 3.94 10*6/MM3 (ref 4.8–5.9)
SAO2 % BLDCOA: 97.5 % (ref 94–100)
SAO2 % BLDCOA: 97.5 % (ref 94–100)
SODIUM BLD-SCNC: 142 MMOL/L (ref 135–145)
TOTAL RATE: 10 BREATHS/MINUTE
VENT CPAP/PEEP: 5
VENTILATOR MODE: AC
WBC NRBC COR # BLD: 9.27 10*3/MM3 (ref 4.8–10.8)

## 2017-07-06 PROCEDURE — 94760 N-INVAS EAR/PLS OXIMETRY 1: CPT

## 2017-07-06 PROCEDURE — 25010000002 HYDROMORPHONE PER 4 MG: Performed by: INTERNAL MEDICINE

## 2017-07-06 PROCEDURE — 25010000002 THIAMINE PER 100 MG: Performed by: FAMILY MEDICINE

## 2017-07-06 PROCEDURE — 94799 UNLISTED PULMONARY SVC/PX: CPT

## 2017-07-06 PROCEDURE — 82803 BLOOD GASES ANY COMBINATION: CPT

## 2017-07-06 PROCEDURE — 94003 VENT MGMT INPAT SUBQ DAY: CPT

## 2017-07-06 PROCEDURE — 25010000002 MAGNESIUM SULFATE PER 500 MG OF MAGNESIUM: Performed by: FAMILY MEDICINE

## 2017-07-06 PROCEDURE — 25010000002 LORAZEPAM PER 2 MG: Performed by: FAMILY MEDICINE

## 2017-07-06 PROCEDURE — 85027 COMPLETE CBC AUTOMATED: CPT | Performed by: INTERNAL MEDICINE

## 2017-07-06 PROCEDURE — 25010000002 LEVETIRACETAM IN NACL 0.82% 500 MG/100ML SOLUTION: Performed by: PSYCHIATRY & NEUROLOGY

## 2017-07-06 PROCEDURE — 82962 GLUCOSE BLOOD TEST: CPT

## 2017-07-06 PROCEDURE — 87493 C DIFF AMPLIFIED PROBE: CPT | Performed by: FAMILY MEDICINE

## 2017-07-06 PROCEDURE — 25010000002 THIAMINE PER 100 MG: Performed by: INTERNAL MEDICINE

## 2017-07-06 PROCEDURE — 87999 UNLISTED MICROBIOLOGY PX: CPT | Performed by: INTERNAL MEDICINE

## 2017-07-06 PROCEDURE — 71010 HC CHEST PA OR AP: CPT

## 2017-07-06 PROCEDURE — 83735 ASSAY OF MAGNESIUM: CPT | Performed by: INTERNAL MEDICINE

## 2017-07-06 PROCEDURE — 94770: CPT

## 2017-07-06 PROCEDURE — 25010000002 PROPOFOL 1000 MG/ML EMULSION: Performed by: INTERNAL MEDICINE

## 2017-07-06 PROCEDURE — 36600 WITHDRAWAL OF ARTERIAL BLOOD: CPT

## 2017-07-06 PROCEDURE — 99232 SBSQ HOSP IP/OBS MODERATE 35: CPT | Performed by: PSYCHIATRY & NEUROLOGY

## 2017-07-06 PROCEDURE — 80048 BASIC METABOLIC PNL TOTAL CA: CPT | Performed by: INTERNAL MEDICINE

## 2017-07-06 PROCEDURE — 25010000002 LORAZEPAM PER 2 MG: Performed by: INTERNAL MEDICINE

## 2017-07-06 PROCEDURE — 63710000001 FLINTSTONES COMPLETE 60 MG CHEWABLE TABLET: Performed by: INTERNAL MEDICINE

## 2017-07-06 RX ORDER — POTASSIUM CHLORIDE 20MEQ/15ML
20 LIQUID (ML) ORAL 2 TIMES DAILY
Status: COMPLETED | OUTPATIENT
Start: 2017-07-06 | End: 2017-07-06

## 2017-07-06 RX ORDER — LORAZEPAM 2 MG/ML
2 INJECTION INTRAMUSCULAR
Status: DISCONTINUED | OUTPATIENT
Start: 2017-07-06 | End: 2017-07-15

## 2017-07-06 RX ORDER — LORAZEPAM 1 MG/1
4 TABLET ORAL
Status: DISCONTINUED | OUTPATIENT
Start: 2017-07-06 | End: 2017-07-15

## 2017-07-06 RX ORDER — AMLODIPINE BESYLATE 5 MG/1
5 TABLET ORAL
Status: DISCONTINUED | OUTPATIENT
Start: 2017-07-06 | End: 2017-07-07

## 2017-07-06 RX ORDER — HALOPERIDOL 5 MG/ML
5 INJECTION INTRAMUSCULAR EVERY 6 HOURS PRN
Status: DISCONTINUED | OUTPATIENT
Start: 2017-07-06 | End: 2017-07-11

## 2017-07-06 RX ORDER — LORAZEPAM 2 MG/ML
4 INJECTION INTRAMUSCULAR
Status: DISCONTINUED | OUTPATIENT
Start: 2017-07-06 | End: 2017-07-15

## 2017-07-06 RX ORDER — LORAZEPAM 2 MG/ML
1 INJECTION INTRAMUSCULAR
Status: DISCONTINUED | OUTPATIENT
Start: 2017-07-06 | End: 2017-07-15

## 2017-07-06 RX ORDER — LORAZEPAM 1 MG/1
1 TABLET ORAL
Status: DISCONTINUED | OUTPATIENT
Start: 2017-07-06 | End: 2017-07-15

## 2017-07-06 RX ORDER — LORAZEPAM 1 MG/1
2 TABLET ORAL
Status: DISCONTINUED | OUTPATIENT
Start: 2017-07-06 | End: 2017-07-15

## 2017-07-06 RX ADMIN — LORAZEPAM 2 MG: 2 INJECTION INTRAMUSCULAR; INTRAVENOUS at 00:54

## 2017-07-06 RX ADMIN — THIAMINE HYDROCHLORIDE 100 MG: 100 INJECTION, SOLUTION INTRAMUSCULAR; INTRAVENOUS at 08:09

## 2017-07-06 RX ADMIN — LORAZEPAM 2 MG: 2 INJECTION INTRAMUSCULAR; INTRAVENOUS at 22:44

## 2017-07-06 RX ADMIN — PROPOFOL 75 MCG/KG/MIN: 10 INJECTION, EMULSION INTRAVENOUS at 07:59

## 2017-07-06 RX ADMIN — LORAZEPAM 4 MG: 2 INJECTION INTRAMUSCULAR at 19:05

## 2017-07-06 RX ADMIN — LORAZEPAM 4 MG: 2 INJECTION INTRAMUSCULAR at 12:24

## 2017-07-06 RX ADMIN — NICARDIPINE HYDROCHLORIDE 2.5 MG/HR: 25 INJECTION INTRAVENOUS at 01:32

## 2017-07-06 RX ADMIN — THIAMINE HYDROCHLORIDE 100 ML/HR: 100 INJECTION, SOLUTION INTRAMUSCULAR; INTRAVENOUS at 11:52

## 2017-07-06 RX ADMIN — LEVETIRACETAM 500 MG: 5 INJECTION INTRAVENOUS at 08:09

## 2017-07-06 RX ADMIN — FAMOTIDINE 20 MG: 10 INJECTION INTRAVENOUS at 08:08

## 2017-07-06 RX ADMIN — AMLODIPINE BESYLATE 5 MG: 5 TABLET ORAL at 11:51

## 2017-07-06 RX ADMIN — ALBUTEROL SULFATE 2.5 MG: 2.5 SOLUTION RESPIRATORY (INHALATION) at 06:58

## 2017-07-06 RX ADMIN — NICARDIPINE HYDROCHLORIDE 10 MG/HR: 25 INJECTION INTRAVENOUS at 18:21

## 2017-07-06 RX ADMIN — Medication 1 TABLET: at 08:08

## 2017-07-06 RX ADMIN — FAMOTIDINE 20 MG: 10 INJECTION INTRAVENOUS at 20:12

## 2017-07-06 RX ADMIN — POTASSIUM CHLORIDE 20 MEQ: 20 SOLUTION ORAL at 17:10

## 2017-07-06 RX ADMIN — PROPOFOL 75 MCG/KG/MIN: 10 INJECTION, EMULSION INTRAVENOUS at 21:35

## 2017-07-06 RX ADMIN — LORAZEPAM 2 MG: 2 INJECTION INTRAMUSCULAR; INTRAVENOUS at 07:57

## 2017-07-06 RX ADMIN — NICARDIPINE HYDROCHLORIDE 12.5 MG/HR: 25 INJECTION INTRAVENOUS at 15:42

## 2017-07-06 RX ADMIN — PROPOFOL 65 MCG/KG/MIN: 10 INJECTION, EMULSION INTRAVENOUS at 02:31

## 2017-07-06 RX ADMIN — LORAZEPAM 4 MG: 2 INJECTION INTRAMUSCULAR at 13:41

## 2017-07-06 RX ADMIN — LORAZEPAM 4 MG: 2 INJECTION INTRAMUSCULAR at 20:32

## 2017-07-06 RX ADMIN — HYDROMORPHONE HYDROCHLORIDE 1 MG: 1 INJECTION, SOLUTION INTRAMUSCULAR; INTRAVENOUS; SUBCUTANEOUS at 23:23

## 2017-07-06 RX ADMIN — NICARDIPINE HYDROCHLORIDE 15 MG/HR: 25 INJECTION INTRAVENOUS at 10:52

## 2017-07-06 RX ADMIN — NICARDIPINE HYDROCHLORIDE 7.5 MG/HR: 25 INJECTION INTRAVENOUS at 20:39

## 2017-07-06 RX ADMIN — NICARDIPINE HYDROCHLORIDE 12.5 MG/HR: 25 INJECTION INTRAVENOUS at 13:17

## 2017-07-06 RX ADMIN — PROPOFOL 75 MCG/KG/MIN: 10 INJECTION, EMULSION INTRAVENOUS at 05:47

## 2017-07-06 RX ADMIN — PROPOFOL 75 MCG/KG/MIN: 10 INJECTION, EMULSION INTRAVENOUS at 16:54

## 2017-07-06 RX ADMIN — ACETAMINOPHEN 400 MCG: 400 TABLET ORAL at 08:08

## 2017-07-06 RX ADMIN — LEVETIRACETAM 500 MG: 5 INJECTION INTRAVENOUS at 20:12

## 2017-07-06 RX ADMIN — POTASSIUM CHLORIDE 20 MEQ: 20 SOLUTION ORAL at 11:52

## 2017-07-06 RX ADMIN — DIAZEPAM 10 MG: 10 TABLET ORAL at 05:47

## 2017-07-06 RX ADMIN — CHLORHEXIDINE GLUCONATE 15 ML: 1.2 RINSE ORAL at 09:29

## 2017-07-06 RX ADMIN — PROPOFOL 75 MCG/KG/MIN: 10 INJECTION, EMULSION INTRAVENOUS at 13:46

## 2017-07-06 RX ADMIN — ALBUTEROL SULFATE 2.5 MG: 2.5 SOLUTION RESPIRATORY (INHALATION) at 18:58

## 2017-07-06 RX ADMIN — PROPOFOL 75 MCG/KG/MIN: 10 INJECTION, EMULSION INTRAVENOUS at 10:52

## 2017-07-06 RX ADMIN — LORAZEPAM 4 MG: 2 INJECTION INTRAMUSCULAR at 17:07

## 2017-07-06 RX ADMIN — PROPOFOL 75 MCG/KG/MIN: 10 INJECTION, EMULSION INTRAVENOUS at 18:47

## 2017-07-06 RX ADMIN — METOPROLOL TARTRATE 50 MG: 50 TABLET ORAL at 08:09

## 2017-07-06 RX ADMIN — ALBUTEROL SULFATE 2.5 MG: 2.5 SOLUTION RESPIRATORY (INHALATION) at 12:35

## 2017-07-06 RX ADMIN — CHLORHEXIDINE GLUCONATE 15 ML: 1.2 RINSE ORAL at 21:34

## 2017-07-06 RX ADMIN — ALBUTEROL SULFATE 2.5 MG: 2.5 SOLUTION RESPIRATORY (INHALATION) at 01:14

## 2017-07-06 RX ADMIN — METOPROLOL TARTRATE 50 MG: 50 TABLET ORAL at 20:12

## 2017-07-06 NOTE — PROGRESS NOTES
[facility] Medicine Services  INPATIENT PROGRESS NOTE    Length of Stay: 2  Date of Admission: 7/3/2017  Primary Care Physician: Linda Hoffman, DNP, APRN    Subjective     Chief Complaint:   Alcohol withdrawal      HPI     The patient is intubated and remains quite agitated and is struggling against 4. restraints despite maximum propofol dosage and scheduled Valium.  I discussed at length with the patient's mother and daughter the plan for extubation today and his continuation of propofol in favor of an aggressive Ativan detoxification protocol.  The patient's previously noted tongue swelling from ACE inhibitor and a self-inflicted bite has resolved.  ENT has signed off the case.  The patient continues to require a Cardene drip for blood pressure control largely secondary to agitation from alcohol withdrawal.  He continues on antiseizure medication.      Review of Systems     Unable to obtain secondary to intubation and sedation  All pertinent negatives and positives are as above. All other systems have been reviewed and are negative unless otherwise stated.     Objective    Temp:  [98.1 °F (36.7 °C)-99.6 °F (37.6 °C)] 99.6 °F (37.6 °C)  Heart Rate:  [] 110  Resp:  [11-18] 14  BP: (111-168)/() 164/114  FiO2 (%):  [30 %] 30 %    Lab Results (last 24 hours)     Procedure Component Value Units Date/Time    CBC (No Diff) [[id number]]  (Abnormal) Collected:  07/06/17 0222    Specimen:  Blood Updated:  07/06/17 0256     WBC 9.27 10*3/mm3      RBC 3.94 (L) 10*6/mm3      Hemoglobin 12.1 (L) g/dL      Hematocrit 35.4 (L) %      MCV 89.8 fL      MCH 30.7 pg      MCHC 34.2 g/dL      RDW 14.7 %      RDW-SD 48.2 fl      MPV 9.7 fL      Platelets 216 10*3/mm3     Basic Metabolic Panel [[id number]]  (Abnormal) Collected:  07/06/17 0222    Specimen:  Blood Updated:  07/06/17 0257     Glucose 87 mg/dL      BUN 15 mg/dL      Creatinine 0.98 mg/dL      Sodium 142 mmol/L      Potassium  3.0 (L) mmol/L      Chloride 108 mmol/L      CO2 27.0 mmol/L      Calcium 8.4 mg/dL      eGFR Non African Amer 81 mL/min/1.73      BUN/Creatinine Ratio 15.3     Anion Gap 7.0 mmol/L     Narrative:       GFR Normal >60  Chronic Kidney Disease <60  Kidney Failure <15    Magnesium [[id number]]  (Normal) Collected:  07/06/17 0222    Specimen:  Blood Updated:  07/06/17 0257     Magnesium 2.1 mg/dL     Blood Gas, Arterial [[id number]] Collected:  07/06/17 0356    Specimen:  Arterial Blood Updated:  07/06/17 0401     Site Arterial: right radial     Gera's Test --      Documented in Rapid Comm        pH, Arterial 7.430 pH units      pCO2, Arterial 35.9 mm Hg      pO2, Arterial 95.6 mm Hg      HCO3, Arterial 23.3 mmol/L      Base Excess, Arterial -0.5 mmol/L      O2 Saturation, Arterial 97.5 %      O2 Saturation Calculated 97.5 %      Barometric Pressure for Blood Gas -- mmHg       Component not reported at this site.        Modality Ventilator     FIO2 30 %      Ventilator Mode AC     Rate 10.0 Breaths/minute      PEEP 5.0     Vent CPAP/PEEP 5.0    Narrative:       Serial Number: [id number]    : [id number]    POC Glucose Fingerstick [[id number]]  (Normal) Collected:  07/06/17 0738    Specimen:  Blood Updated:  07/06/17 0750     Glucose 104 mg/dL       : [id number] Azalia Ginny LMeter ID: [id number]             Imaging Results (last 24 hours)     Procedure Component Value Units Date/Time    XR Abdomen KUB [[id number]] Collected:  07/05/17 1207     Updated:  07/05/17 1210    Narrative:       EXAMINATION:   XR ABDOMEN KUB-  7/5/2017 12:07 PM CDT     HISTORY: NG tube placement     Single view the abdomen is obtained. Nasogastric tube is present with  the distal tip satisfactorily positioned in the fundus the stomach.  Bowel gas pattern is nonspecific.       Impression:       Satisfactory placement of nasogastric tube  This report was finalized on [id number] by Dr. Adolph Echols MD.    XR Chest 1 View [[id number]]  Collected:  07/06/17 0718     Updated:  07/06/17 0722    Narrative:       Frontal supine radiograph of the chest 7/6/2017 3:04 AM CDT     History: Intubated Patient; R13.12-Dysphagia, oropharyngeal phase;  R56.9-Unspecified convulsions; T78.3XXS-Angioneurotic edema, sequela;  F10.231-Alcohol dependence with withdrawal delirium     Comparison: 07/05/2017      Findings:   The endotracheal tube is stable in position. An NG tube has been  advanced into the stomach.. No new opacities or pneumothoraces are  visualized in the chest. The cardiomediastinal silhouette and pulmonary  vascularity are unchanged.       No acute osseous or soft tissue abnormality is noted.        Impression:       Impression:   1. No significant interval change since previous exam.        This report was finalized on 07/06/2017 07:19 by Dr. Dilshad Silver MD.             Intake/Output Summary (Last 24 hours) at 07/06/17 1011  Last data filed at 07/06/17 0400   Gross per 24 hour   Intake          2211.26 ml   Output             1150 ml   Net          1061.26 ml       Physical Exam    Constitutional: No distress.   Seen and discussed with his nurse, Bao. No family present. He is agitated and pulling against restraints. ETT secure, PIVs.    HENT:   Head: Normocephalic and atraumatic.   Eyes: Pupils are equal, round, and reactive to light.   Neck: Neck supple. No JVD present.   Cardiovascular: Normal rate and regular rhythm.   Pulmonary/Chest: Effort normal and breath sounds normal.   Ventilated.    Abdominal: Soft. Bowel sounds are normal.   Musculoskeletal: He exhibits edema (trace).   Neurological:   Currently he is sedated with propofol and Ativan. Dr. Campos examined him and felt him to be appropriate neurologically.   Skin: Skin is warm and dry.       Results Review:  I have reviewed the labs, radiology results, and diagnostic studies since my last progress note and made treatment changes reflective of the results.   I have reviewed the  current medications.    Assessment/Plan     Hospital Problem List     Angio-edema    Seizures      Assessment:   1. Possible angioedema related to lisinopril.  2. Malignant hypertension.  3. Possible new onset seizure disorder versus convulsive syncope.  4. Posterior reversible encephalopathy syndrome on MRI.  5. Tongue bite with sublingual hematoma.  6. Tobacco abuse.  7. Obstructive sleep apnea, noncompliant with device.  8. Gouty arthritis.  9. Daily alcohol use with Delirium tremens requiring sedation and mechanical ventilation.  10. Hypokalemia.     PLAN:  Discontinue scheduled valium in favor of alcohol detoxification order set with liberal use of IV or oral Ativan.  Extubate today.  Add amlodipine 5 mg daily per G tube and hopefully can wean Cardene.   Discontinue nicotine patch    Cristino Encinas,    07/06/17   10:11 AM     Approximately 40 minutes of critical care time were spent managing the patient exclusive of billable procedures.

## 2017-07-06 NOTE — PLAN OF CARE
Problem: Patient Care Overview (Adult)  Goal: Plan of Care Review    07/05/17 1711 07/06/17 0604   Coping/Psychosocial Response Interventions   Plan Of Care Reviewed With --  patient   Patient Care Overview   Progress no change --    Outcome Evaluation   Outcome Summary/Follow up Plan --  Cardene on hold since approx 2am. Patient rested well, aggitated with sedation vacation but still following commands.         Problem: SAFETY - NON-VIOLENT RESTRAINT  Goal: Remains free of injury from restraints (Non-Violent Restraint)  Outcome: Ongoing (interventions implemented as appropriate)  Goal: Free from restraint(s) (Non-Violent Restraint)  Outcome: Unable to achieve outcome(s) by discharge Date Met:  07/06/17

## 2017-07-06 NOTE — PROGRESS NOTES
Neurology Progress Note      Chief Complaint:  PRES, seizure, EtOH w/d    Subjective     Subjective:    No new issues overnight.  No seizures reported.  Required Cardene off and on but generally SBPs labile, DBPs 70s to 90s, occasional low 100s.  Requiring less Cardene and on Metoprolol.  Remains intubated for now.  Agitation is better but still requiring high levels of propofol.  Needing less Ativan, received one dose earlier in the night.  On Valium scheduled, last dose this am.   Requiring straight cath, 600cc last cath.    Medications:  Current Facility-Administered Medications   Medication Dose Route Frequency Provider Last Rate Last Dose   • acetaminophen (TYLENOL) tablet 650 mg  650 mg Oral Q4H PRN Jairon Perez DO   650 mg at 07/03/17 2010   • albuterol (PROVENTIL) nebulizer solution 0.083% 2.5 mg/3mL  2.5 mg Nebulization Q6H - RT Jairon Perez DO   2.5 mg at 07/06/17 0114   • chlorhexidine (PERIDEX) 0.12 % solution 15 mL  15 mL Mouth/Throat Q12H Jairon Perez DO   15 mL at 07/05/17 1118   • diazePAM (VALIUM) tablet 10 mg  10 mg Nasogastric Q8H Jairon Perez DO   10 mg at 07/06/17 0547   • famotidine (PEPCID) injection 20 mg  20 mg Intravenous Q12H Jairon Perez DO   20 mg at 07/05/17 2017   • flintstones complete (FLINTSTONES) chewable tablet 1 tablet  1 tablet Oral Daily Jairon Perez DO   1 tablet at 07/05/17 1225   • folic acid (FOLVITE) tablet 400 mcg  400 mcg Oral Daily Royal Campos MD   400 mcg at 07/05/17 1225   • HYDROcodone-acetaminophen (NORCO)  MG per tablet 1 tablet  1 tablet Oral Q4H PRN Jairon Perez DO   1 tablet at 07/03/17 1758   • HYDROcodone-acetaminophen (NORCO) 5-325 MG per tablet 1 tablet  1 tablet Oral Q4H PRN Jairon Perez DO       • HYDROmorphone (DILAUDID) injection 1 mg  1 mg Intravenous Q3H PRN Jairon Perez DO        And   • naloxone (NARCAN) injection 0.4 mg  0.4 mg Intravenous Q5 Min PRN Jairon Perez DO       • levETIRAcetam in NaCl 0.82%  (KEPPRA) IVPB 500 mg  500 mg Intravenous Q12H Royal Campos MD   500 mg at 07/05/17 2017   • LORazepam (ATIVAN) injection 2 mg  2 mg Intravenous Q4H PRN Jairon Perez DO   2 mg at 07/06/17 0054   • metoprolol tartrate (LOPRESSOR) tablet 50 mg  50 mg Nasogastric Q12H Jairon Perez DO   50 mg at 07/05/17 2017   • niCARdipine (CARDENE) 25 mg/250 mL (0.1 mg/mL) 0.9% NS infusion  5-15 mg/hr Intravenous Titrated Jairon Perez DO   Stopped at 07/06/17 0149   • nicotine (NICODERM CQ) 14 MG/24HR patch 1 patch  1 patch Transdermal Q24H Jairon Perez DO   1 patch at 07/05/17 1639   • ondansetron (ZOFRAN) injection 4 mg  4 mg Intravenous Q6H PRN Jairon Perez DO       • propofol (DIPRIVAN) infusion 10 mg/mL 100 mL  5-50 mcg/kg/min Intravenous Titrated Jairon Perez DO 37.3 mL/hr at 07/06/17 0648 75 mcg/kg/min at 07/06/17 0648   • sodium chloride 0.9 % flush 1-10 mL  1-10 mL Intravenous PRN Jairon Perez DO       • thiamine (B-1) 100 mg in sodium chloride 0.9 % 100 mL IVPB  100 mg Intravenous Daily Jairon Perez  mL/hr at 07/05/17 0950 100 mg at 07/05/17 0950       Review of Systems:   Could not be completed, sedated, intubated      Objective      Vital Signs  Temp:  [97.2 °F (36.2 °C)-98.6 °F (37 °C)] 98.6 °F (37 °C)  Heart Rate:  [] 87  Resp:  [11-18] 14  BP: (111-178)/() 161/100  FiO2 (%):  [30 %] 30 %    Physical Exam:  Sedated but breaks throught  Intubated  CTAB  RRR  Abdoment Distended  Ext:  2+ edema in feet    Neuro:  Currently on propofol  Opens eyes, tracks and follows voice  Following simple commands  PERRL, Dolls positive, Blinks  Moving all extremities actively against restraints, no focal weakness noted  W/D to pain throughout     Results Review:    I reviewed the patient's new clinical results.      Results from last 7 days  Lab Units 07/06/17  0222 07/05/17  0158 07/03/17  0807   WBC 10*3/mm3 9.27 15.95* 11.95*   HEMOGLOBIN g/dL 12.1* 12.8* 11.9*   HEMATOCRIT % 35.4* 38.1*  34.1*   PLATELETS 10*3/mm3 216 246 245          Results from last 7 days  Lab Units 07/06/17  0222 07/05/17  0158 07/04/17  0937 07/03/17  0807   SODIUM mmol/L 142 143 142 141   POTASSIUM mmol/L 3.0* 3.7 3.8 3.0*   CHLORIDE mmol/L 108 105 105 102   CO2 mmol/L 27.0 28.0 26.0 29.0   BUN mg/dL 15 16 12 9   CREATININE mg/dL 0.98 1.10 1.13 1.19   CALCIUM mg/dL 8.4 9.1 9.1 8.8   BILIRUBIN mg/dL  --   --   --  0.6   ALK PHOS U/L  --   --   --  69   ALT (SGPT) U/L  --   --   --  39   AST (SGOT) U/L  --   --   --  72*   GLUCOSE mg/dL 87 96 126* 127*        Lab Results   Component Value Date    MG 2.1 07/06/2017     No components found for: POCGLUC  No components found for: A1C  Lab Results   Component Value Date    HDL 36 02/04/2016     No components found for: B12  No results found for: TSH    Assessment/Plan     Hospital Problem List    Active Problems:    Angio-edema    Seizures    Impression  52 year old admitted with seizures, malignant HTN, PRES, now in EtOH w/d.  Currently intubated for airway protection, sedated for agitations.  Neuro exam remains stable.      Plan  1. PRES  --BPs are improving.  Continue to titrate PO meds and wean off Cardene.  I will defer to medicine regarding HTN meds  --Continue Keppra. No evidence of seizures. Will be on this until he has a clear MRI as o/p in 4-6 weeks.  --Plan to repeat imaging when stable or if condition worsens      2. DTs  --Continue Ativan, propofol, wean over the next 1-2 days, no seizures reported.  Vitals more stable on meds  --Will defer timing of extubation per medicine.  No reason from my standpoint not to extubate  --Continue Vitamin Folic Acid, Thiamine replacement.  --Watch Potassium  --Will need D/C planning and possible rehab.      3. Tongue swelling: ENT has signed off.  Tongue swelling stable.      4.  I had a long discussion with his mother yesterday explaining his current situation.  She understand the nature of PRES and understands that he is withdrawing.   She confirms heavy EtOH use          Royal Campos MD  07/06/17  6:57 AM

## 2017-07-06 NOTE — CONSULTS
CRITICAL CARE NOTE - [facility]    Nishant Savage   MR# [id number]  Acct# [id number]  7/6/2017   5:01 PM    Referring Provider: Cristino Encinas DO    Chief Complaint: Respiratory failure requiring mechanical ventilation.      HPI: We are consulted by Cristino Encinas DO to see this critically ill 52 y.o. year old male born on 1965 who presented on 7/3/2017.  He had been admitted on the July 3 because of angioedema and subsequent seizure activity.  He subsequently required intubation because of severe agitation felt to be due to ethanol withdrawal.  When an attempt was made to wean him from the ventilator he had problems with hyperventilation and he still is requiring sedation to control agitation but has not had any further seizures apparently.  He obviously can provide no history.    Past Medical History  Past Medical History:   Diagnosis Date   • Anxiety    • Arthritis    • Gout    • HTN (hypertension)    • Hyperlipidemia      History reviewed. No pertinent surgical history.  Allergies   Allergen Reactions   • Lipitor [Atorvastatin] Rash     Medications    albuterol 2.5 mg Nebulization Q6H - RT   amLODIPine 5 mg Per G Tube Q24H   chlorhexidine 15 mL Mouth/Throat Q12H   famotidine 20 mg Intravenous Q12H   levETIRAcetam 500 mg Intravenous Q12H   metoprolol tartrate 50 mg Nasogastric Q12H   IV Fluids 1000 mL + additives 100 mL/hr Intravenous Daily   potassium chloride 20 mEq Per G Tube BID   thiamine (VITAMIN B1) IVPB 100 mg Intravenous Daily       niCARdipine 5-15 mg/hr Last Rate: 12.5 mg/hr (07/06/17 1542)   propofol 5-50 mcg/kg/min Last Rate: 75 mcg/kg/min (07/06/17 1654)     Social History   reports that he has been smoking Cigarettes.  He has a 9.90 pack-year smoking history. He does not have any smokeless tobacco history on file. He reports that he drinks about 1.2 oz of alcohol per week  He reports that he does not use illicit drugs.  Family History  family history includes Diabetes  in his maternal grandmother and mother; Heart attack in his paternal grandfather; Heart disease in his father; Hypertension in his father and mother; Kidney disease in his sister; No Known Problems in his daughter, maternal grandfather, paternal grandmother, and son.  Review of Systems:  The patient notably is critically ill and connected to a ventilator.  As such patient cannot communicate and provide any history whatsoever, including any history of present illness or interval history since arrival or review of systems. The interested reviewer may note this fact, as an attempt has been made at collecting and documenting these portions of the patient history, but this information is unobtainable despite attempted review and therefore cannot be documented at this time.   Physical Exam:  Temp:  [98.4 °F (36.9 °C)-99.6 °F (37.6 °C)] 99.6 °F (37.6 °C)  Heart Rate:  [] 98  Resp:  [11-18] 14  BP: (112-178)/() 130/76  FiO2 (%):  [30 %] 30 %  Intake/Output Summary (Last 24 hours) at 07/06/17 1701  Last data filed at 07/06/17 0400   Gross per 24 hour   Intake           1334.1 ml   Output              650 ml   Net            684.1 ml     Last 2 weights    07/04/17  0400 07/06/17  0400   Weight: 182 lb 9.6 oz (82.8 kg) 185 lb 12.8 oz (84.3 kg)     SpO2 Readings from Last 3 Encounters:   07/06/17 93%   09/30/16 98%   02/04/16 96%     Ventilator Settings:  Vent Mode: VC/AC  Vt (Set, L): 0.65 L  Resp Rate (Set): 10  Pressure Support (cm H2O): 0 cm H20  FiO2 (%): 30 %  PEEP/CPAP (cm H2O): 5 cm H20  Minute Ventilation (L/min) (Obs): 11.3 L/min  Resp Rate (Observed) Vent: 17  I:E Ratio (Set): 1:2.20  I:E Ratio (Obs): 1:2  PIP Observed (cm H2O): 21 cm H2O  Plateau Pressure (cm H2O): 21 cm H2O  RSBI: 202  Physical Exam Gen.: He is a middle-aged white male who sedated and intubated on mechanical ventilatory support.  HEENT: Endotracheal tube is in place.  Neck: Supple.  Respiratory: Fair air movement bilaterally.  Cardiac:  No murmur or gallop noted.  His pulse is running in the mid 90s.  Abdomen: Soft.  Neurologic: Cannot assess as he is sedated and intubated.  Psychiatric: Cannot assess as he is sedated and intubated.  Dermatologic: No rash noted.  Extremities: SCDs are in place.  Musculoskeletal: No joint deformities noted.  Lymphatic: No adenopathy palpated    Results from last 7 days  Lab Units 07/06/17  0222 07/05/17  0158 07/03/17  0807   WBC 10*3/mm3 9.27 15.95* 11.95*   HEMOGLOBIN g/dL 12.1* 12.8* 11.9*   PLATELETS 10*3/mm3 216 246 245       Results from last 7 days  Lab Units 07/06/17  0222 07/05/17  0158 07/04/17  0937   SODIUM mmol/L 142 143 142   POTASSIUM mmol/L 3.0* 3.7 3.8   CO2 mmol/L 27.0 28.0 26.0   BUN mg/dL 15 16 12   CREATININE mg/dL 0.98 1.10 1.13   MAGNESIUM mg/dL 2.1  --  2.0   GLUCOSE mg/dL 87 96 126*       Results from last 7 days  Lab Units 07/06/17  0356 07/05/17  0322 07/05/17  0224   PH, ARTERIAL pH units 7.430 7.414 7.408   PCO2, ARTERIAL mm Hg 35.9 43.0 40.7   PO2 ART mm Hg 95.6 86.5 101.5*   FIO2 % 30 30 50        No results found for: PROBNP  Recent films:  Imaging Results (last 72 hours)     Procedure Component Value Units Date/Time    XR Chest 1 View [[id number]] Collected:  07/04/17 1543     Updated:  07/04/17 1548    Narrative:       EXAMINATION: XR CHEST 1 VW- 7/4/2017 14:43 CST     HISTORY: Intubation     Comparison: None     Findings:  Endotracheal tube is in place with tip approximately 3 cm above the  jolly. Low lung volumes are present bilaterally. There is atelectasis  at the left lung base. The lung bases otherwise appear clear. The  pulmonary vasculature appears normal.       Impression:       Impression:  1. Apparent appropriate endotracheal tube position.  2. Left basilar atelectasis.  This report was finalized on 07/04/2017 15:45 by Dr. Driss Ward MD.    XR Chest 1 View [[id number]] Collected:  07/05/17 0724     Updated:  07/05/17 0728    Narrative:       Frontal supine radiograph  of the chest 7/5/2017 3:10 AM CDT     History: Intubated Patient; R13.12-Dysphagia, oropharyngeal phase;  R56.9-Unspecified convulsions; T78.3XXS-Angioneurotic edema, sequela;  F10.231-Alcohol dependence with withdrawal delirium     Comparison: 07/04/2017      Findings:   Lines and tubes are stable in position. No new opacities or  pneumothoraces are visualized in the chest. The cardiomediastinal  silhouette and pulmonary vascularity are unchanged.       No acute osseous or soft tissue abnormality is noted.        Impression:       Impression:   1. No significant interval change since previous exam.        This report was finalized on 07/05/2017 07:25 by Dr. Dilshad Silver MD.    XR Abdomen KUB [[id number]] Collected:  07/05/17 1207     Updated:  07/05/17 1210    Narrative:       EXAMINATION:   XR ABDOMEN KUB-  7/5/2017 12:07 PM CDT     HISTORY: NG tube placement     Single view the abdomen is obtained. Nasogastric tube is present with  the distal tip satisfactorily positioned in the fundus the stomach.  Bowel gas pattern is nonspecific.       Impression:       Satisfactory placement of nasogastric tube  This report was finalized on [id number] by Dr. Adolph Echols MD.    XR Chest 1 View [[id number]] Collected:  07/06/17 0718     Updated:  07/06/17 0722    Narrative:       Frontal supine radiograph of the chest 7/6/2017 3:04 AM CDT     History: Intubated Patient; R13.12-Dysphagia, oropharyngeal phase;  R56.9-Unspecified convulsions; T78.3XXS-Angioneurotic edema, sequela;  F10.231-Alcohol dependence with withdrawal delirium     Comparison: 07/05/2017      Findings:   The endotracheal tube is stable in position. An NG tube has been  advanced into the stomach.. No new opacities or pneumothoraces are  visualized in the chest. The cardiomediastinal silhouette and pulmonary  vascularity are unchanged.       No acute osseous or soft tissue abnormality is noted.        Impression:       Impression:   1. No significant  interval change since previous exam.        This report was finalized on 07/06/2017 07:19 by Dr. Dilshad Silver MD.      My impression of chest x-ray: I suspect he has some left basilar atelectasis.  Problem list:  Patient Active Problem List   Diagnosis   • Hyperlipidemia   • HTN (hypertension)   • Gout   • Anxiety   • Arthritis   • Erectile dysfunction   • Angio-edema   • Seizures     Pulmonary Assessment:  1. Respiratory failure related to agitation from ethanol withdrawal.  2. Recent problems with angioedema which have resolved.    3. History of chronic ethanol use.    Recommend:   · Continue ventilatory support and sedation as need be to control his ethanol withdrawal.  If he just had some mild problems with agitation could consider Precedex as an adjunct to weaning (requiring rarely extensive sedation to treat his ethanol withdrawal then he is not currently be a candidate for weaning from the ventilator and did not tolerate even a brief CPAP trial earlier today.    Critical Care Time: 40 minutes from 4:20 PM until 5 PM  Critical care time was necessary to treat or prevent imminent or life-threatening deterioration of the following conditions:   1.  Respiratory failure with failure to wean from the ventilator.  2.  Severe ethanol withdrawal with recent seizure activity.  3.  Recent episode of angioedema with possible upper airway compromise.    Time was spent by me performing the following activities:   1.  Review of the patient's chart, including the condition surrounding his admission, review of the patient's imaging studies, EKG, and 2-D echocardiogram.  2.  Bedside evaluation the patient's restaurant status as part of the physical exam with conference with respiratory therapy regarding ventilator settings.  3.  Discussion of his sedation needs and possible adjustment of same with nursing staff.    Electronically signed by Indra Gallo MD on 7/6/2017 at 5:01 PM

## 2017-07-06 NOTE — PLAN OF CARE
Problem: Patient Care Overview (Adult)  Goal: Plan of Care Review  Outcome: Ongoing (interventions implemented as appropriate)    07/05/17 1711 07/06/17 0604   Coping/Psychosocial Response Interventions   Plan Of Care Reviewed With --  patient   Patient Care Overview   Progress no change --    Outcome Evaluation   Outcome Summary/Follow up Plan --  Cardene on hold since approx 2am. Patient rested well, aggitated with sedation vacation but still following commands.       Goal: Adult Individualization and Mutuality  Outcome: Ongoing (interventions implemented as appropriate)    Problem: Seizure Disorder/Epilepsy (Adult)  Goal: Signs and Symptoms of Listed Potential Problems Will be Absent or Manageable (Seizure Disorder/Epilepsy)  Outcome: Ongoing (interventions implemented as appropriate)    Problem: Fall Risk (Adult)  Goal: Absence of Falls  Outcome: Ongoing (interventions implemented as appropriate)    Problem: Pressure Ulcer Risk (Roman Scale) (Adult,Obstetrics,Pediatric)  Goal: Identify Related Risk Factors and Signs and Symptoms  Outcome: Ongoing (interventions implemented as appropriate)  Goal: Skin Integrity  Outcome: Ongoing (interventions implemented as appropriate)    Problem: SAFETY - NON-VIOLENT RESTRAINT  Goal: Remains free of injury from restraints (Non-Violent Restraint)  Outcome: Ongoing (interventions implemented as appropriate)    Problem: Nutrition, Enteral (Adult)  Goal: Signs and Symptoms of Listed Potential Problems Will be Absent or Manageable (Nutrition, Enteral)  Outcome: Ongoing (interventions implemented as appropriate)

## 2017-07-07 ENCOUNTER — APPOINTMENT (OUTPATIENT)
Dept: ULTRASOUND IMAGING | Facility: HOSPITAL | Age: 52
End: 2017-07-07

## 2017-07-07 ENCOUNTER — APPOINTMENT (OUTPATIENT)
Dept: GENERAL RADIOLOGY | Facility: HOSPITAL | Age: 52
End: 2017-07-07

## 2017-07-07 ENCOUNTER — APPOINTMENT (OUTPATIENT)
Dept: NEUROLOGY | Facility: HOSPITAL | Age: 52
End: 2017-07-07
Attending: PSYCHIATRY & NEUROLOGY

## 2017-07-07 LAB
ADV 40+41 DNA STL QL NAA+NON-PROBE: NOT DETECTED
ANION GAP SERPL CALCULATED.3IONS-SCNC: 8 MMOL/L (ref 4–13)
ARTERIAL PATENCY WRIST A: ABNORMAL
ASTRO TYP 1-8 RNA STL QL NAA+NON-PROBE: NOT DETECTED
ATMOSPHERIC PRESS: ABNORMAL MMHG
BASE EXCESS BLDA CALC-SCNC: -2.5 MMOL/L (ref -2–2)
BDY SITE: ABNORMAL
BUN BLD-MCNC: 14 MG/DL (ref 5–21)
BUN/CREAT SERPL: 13.3 (ref 7–25)
C CAYETANENSIS DNA STL QL NAA+NON-PROBE: NOT DETECTED
C DIFF TOX GENS STL QL NAA+PROBE: DETECTED
C DIFF TOX GENS STL QL NAA+PROBE: POSITIVE
CALCIUM SPEC-SCNC: 8 MG/DL (ref 8.4–10.4)
CAMPY SP DNA.DIARRHEA STL QL NAA+PROBE: NOT DETECTED
CHLORIDE SERPL-SCNC: 111 MMOL/L (ref 98–110)
CO2 SERPL-SCNC: 23 MMOL/L (ref 24–31)
CREAT BLD-MCNC: 1.05 MG/DL (ref 0.5–1.4)
CRYPTOSP STL CULT: NOT DETECTED
E COLI DNA SPEC QL NAA+PROBE: NOT DETECTED
E HISTOLYT AG STL-ACNC: NOT DETECTED
EAEC PAA PLAS AGGR+AATA ST NAA+NON-PRB: NOT DETECTED
EC STX1+STX2 GENES STL QL NAA+NON-PROBE: NOT DETECTED
EPEC EAE GENE STL QL NAA+NON-PROBE: NOT DETECTED
ETEC LTA+ST1A+ST1B TOX ST NAA+NON-PROBE: NOT DETECTED
G LAMBLIA DNA SPEC QL NAA+PROBE: NOT DETECTED
GFR SERPL CREATININE-BSD FRML MDRD: 74 ML/MIN/1.73
GLUCOSE BLD-MCNC: 119 MG/DL (ref 70–100)
HCO3 BLDA-SCNC: 20.6 MMOL/L (ref 22–26)
HOROWITZ INDEX BLD+IHG-RTO: 30 %
MODALITY: ABNORMAL
NOROVIRUS GI+II RNA STL QL NAA+NON-PROBE: NOT DETECTED
P SHIGELLOIDES DNA STL QL NAA+NON-PROBE: NOT DETECTED
PCO2 BLDA: 31.5 MM HG (ref 35–45)
PEEP RESPIRATORY: 5 CM[H2O]
PH BLDA: 7.43 PH UNITS (ref 7.35–7.45)
PO2 BLDA: 76.4 MM HG (ref 80–100)
POTASSIUM BLD-SCNC: 3.2 MMOL/L (ref 3.5–5.3)
RV RNA STL NAA+PROBE: NOT DETECTED
SALMONELLA DNA SPEC QL NAA+PROBE: NOT DETECTED
SAO2 % BLDCOA: 95.8 % (ref 94–100)
SAO2 % BLDCOA: 95.8 % (ref 94–100)
SAPO I+II+IV+V RNA STL QL NAA+NON-PROBE: NOT DETECTED
SHIGELLA SP+EIEC IPAH ST NAA+NON-PROBE: NOT DETECTED
SODIUM BLD-SCNC: 142 MMOL/L (ref 135–145)
TOTAL RATE: 10 BREATHS/MINUTE
V CHOLERAE DNA SPEC QL NAA+PROBE: NOT DETECTED
VENT CPAP/PEEP: 5
VENTILATOR MODE: AC
VIBRIO DNA SPEC NAA+PROBE: NOT DETECTED
YERSINIA STL CULT: NOT DETECTED

## 2017-07-07 PROCEDURE — 99232 SBSQ HOSP IP/OBS MODERATE 35: CPT | Performed by: PSYCHIATRY & NEUROLOGY

## 2017-07-07 PROCEDURE — 94760 N-INVAS EAR/PLS OXIMETRY 1: CPT

## 2017-07-07 PROCEDURE — 25010000002 HYDROMORPHONE PER 4 MG: Performed by: INTERNAL MEDICINE

## 2017-07-07 PROCEDURE — 71010 HC CHEST PA OR AP: CPT

## 2017-07-07 PROCEDURE — 93971 EXTREMITY STUDY: CPT

## 2017-07-07 PROCEDURE — 25010000003 LEVETIRACETAM IN NACL 0.75% 1000 MG/100ML SOLUTION: Performed by: PSYCHIATRY & NEUROLOGY

## 2017-07-07 PROCEDURE — 94799 UNLISTED PULMONARY SVC/PX: CPT

## 2017-07-07 PROCEDURE — 94770: CPT

## 2017-07-07 PROCEDURE — 95816 EEG AWAKE AND DROWSY: CPT | Performed by: PSYCHIATRY & NEUROLOGY

## 2017-07-07 PROCEDURE — 25010000002 THIAMINE PER 100 MG: Performed by: INTERNAL MEDICINE

## 2017-07-07 PROCEDURE — 25010000002 PROPOFOL 1000 MG/ML EMULSION: Performed by: INTERNAL MEDICINE

## 2017-07-07 PROCEDURE — 25010000002 MAGNESIUM SULFATE PER 500 MG OF MAGNESIUM: Performed by: FAMILY MEDICINE

## 2017-07-07 PROCEDURE — 82803 BLOOD GASES ANY COMBINATION: CPT

## 2017-07-07 PROCEDURE — 94003 VENT MGMT INPAT SUBQ DAY: CPT

## 2017-07-07 PROCEDURE — C1751 CATH, INF, PER/CENT/MIDLINE: HCPCS

## 2017-07-07 PROCEDURE — 25010000002 THIAMINE PER 100 MG: Performed by: FAMILY MEDICINE

## 2017-07-07 PROCEDURE — 25010000002 LORAZEPAM PER 2 MG: Performed by: FAMILY MEDICINE

## 2017-07-07 PROCEDURE — 80048 BASIC METABOLIC PNL TOTAL CA: CPT | Performed by: FAMILY MEDICINE

## 2017-07-07 PROCEDURE — 25010000002 HALOPERIDOL LACTATE PER 5 MG: Performed by: FAMILY MEDICINE

## 2017-07-07 PROCEDURE — 36600 WITHDRAWAL OF ARTERIAL BLOOD: CPT

## 2017-07-07 PROCEDURE — 95816 EEG AWAKE AND DROWSY: CPT

## 2017-07-07 PROCEDURE — 93931 UPPER EXTREMITY STUDY: CPT

## 2017-07-07 RX ORDER — AMLODIPINE BESYLATE 10 MG/1
10 TABLET ORAL
Status: DISCONTINUED | OUTPATIENT
Start: 2017-07-08 | End: 2017-07-10

## 2017-07-07 RX ORDER — LEVETIRACETAM 10 MG/ML
1000 INJECTION INTRAVASCULAR EVERY 12 HOURS SCHEDULED
Status: DISCONTINUED | OUTPATIENT
Start: 2017-07-07 | End: 2017-07-14

## 2017-07-07 RX ADMIN — LORAZEPAM 4 MG: 2 INJECTION INTRAMUSCULAR at 15:05

## 2017-07-07 RX ADMIN — PROPOFOL 75 MCG/KG/MIN: 10 INJECTION, EMULSION INTRAVENOUS at 06:01

## 2017-07-07 RX ADMIN — METOPROLOL TARTRATE 50 MG: 50 TABLET ORAL at 21:50

## 2017-07-07 RX ADMIN — AMLODIPINE BESYLATE 5 MG: 5 TABLET ORAL at 08:19

## 2017-07-07 RX ADMIN — CHLORHEXIDINE GLUCONATE 15 ML: 1.2 RINSE ORAL at 08:19

## 2017-07-07 RX ADMIN — LORAZEPAM 4 MG: 2 INJECTION INTRAMUSCULAR at 08:19

## 2017-07-07 RX ADMIN — ALBUTEROL SULFATE 2.5 MG: 2.5 SOLUTION RESPIRATORY (INHALATION) at 18:55

## 2017-07-07 RX ADMIN — CHLORHEXIDINE GLUCONATE 15 ML: 1.2 RINSE ORAL at 21:50

## 2017-07-07 RX ADMIN — HALOPERIDOL LACTATE 5 MG: 5 INJECTION, SOLUTION INTRAMUSCULAR at 09:51

## 2017-07-07 RX ADMIN — LORAZEPAM 4 MG: 2 INJECTION INTRAMUSCULAR at 17:27

## 2017-07-07 RX ADMIN — METRONIDAZOLE 500 MG: 500 INJECTION, SOLUTION INTRAVENOUS at 17:22

## 2017-07-07 RX ADMIN — METOPROLOL TARTRATE 50 MG: 50 TABLET ORAL at 08:19

## 2017-07-07 RX ADMIN — NICARDIPINE HYDROCHLORIDE 5 MG/HR: 25 INJECTION INTRAVENOUS at 08:56

## 2017-07-07 RX ADMIN — PROPOFOL 55 MCG/KG/MIN: 10 INJECTION, EMULSION INTRAVENOUS at 17:27

## 2017-07-07 RX ADMIN — FAMOTIDINE 20 MG: 10 INJECTION INTRAVENOUS at 08:19

## 2017-07-07 RX ADMIN — PROPOFOL 75 MCG/KG/MIN: 10 INJECTION, EMULSION INTRAVENOUS at 08:56

## 2017-07-07 RX ADMIN — HYDROMORPHONE HYDROCHLORIDE 1 MG: 1 INJECTION, SOLUTION INTRAMUSCULAR; INTRAVENOUS; SUBCUTANEOUS at 09:02

## 2017-07-07 RX ADMIN — ALBUTEROL SULFATE 2.5 MG: 2.5 SOLUTION RESPIRATORY (INHALATION) at 01:07

## 2017-07-07 RX ADMIN — NICARDIPINE HYDROCHLORIDE 5 MG/HR: 25 INJECTION INTRAVENOUS at 21:34

## 2017-07-07 RX ADMIN — PROPOFOL 75 MCG/KG/MIN: 10 INJECTION, EMULSION INTRAVENOUS at 00:58

## 2017-07-07 RX ADMIN — THIAMINE HYDROCHLORIDE 100 MG: 100 INJECTION, SOLUTION INTRAMUSCULAR; INTRAVENOUS at 08:19

## 2017-07-07 RX ADMIN — METRONIDAZOLE 500 MG: 500 INJECTION, SOLUTION INTRAVENOUS at 09:02

## 2017-07-07 RX ADMIN — LORAZEPAM 4 MG: 2 INJECTION INTRAMUSCULAR at 19:40

## 2017-07-07 RX ADMIN — LORAZEPAM 4 MG: 2 INJECTION INTRAMUSCULAR at 20:39

## 2017-07-07 RX ADMIN — METRONIDAZOLE 500 MG: 500 INJECTION, SOLUTION INTRAVENOUS at 02:14

## 2017-07-07 RX ADMIN — PROPOFOL 50 MCG/KG/MIN: 10 INJECTION, EMULSION INTRAVENOUS at 21:33

## 2017-07-07 RX ADMIN — FAMOTIDINE 20 MG: 10 INJECTION INTRAVENOUS at 21:50

## 2017-07-07 RX ADMIN — PROPOFOL 75 MCG/KG/MIN: 10 INJECTION, EMULSION INTRAVENOUS at 12:25

## 2017-07-07 RX ADMIN — NICARDIPINE HYDROCHLORIDE 5 MG/HR: 25 INJECTION INTRAVENOUS at 00:32

## 2017-07-07 RX ADMIN — THIAMINE HYDROCHLORIDE 100 ML/HR: 100 INJECTION, SOLUTION INTRAMUSCULAR; INTRAVENOUS at 08:19

## 2017-07-07 RX ADMIN — ALBUTEROL SULFATE 2.5 MG: 2.5 SOLUTION RESPIRATORY (INHALATION) at 12:00

## 2017-07-07 RX ADMIN — PROPOFOL 75 MCG/KG/MIN: 10 INJECTION, EMULSION INTRAVENOUS at 15:05

## 2017-07-07 RX ADMIN — LEVETIRACETAM 1000 MG: 1000 INJECTION, SOLUTION INTRAVENOUS at 08:19

## 2017-07-07 RX ADMIN — PROPOFOL 75 MCG/KG/MIN: 10 INJECTION, EMULSION INTRAVENOUS at 03:05

## 2017-07-07 RX ADMIN — HALOPERIDOL LACTATE 5 MG: 5 INJECTION, SOLUTION INTRAMUSCULAR at 18:40

## 2017-07-07 RX ADMIN — ALBUTEROL SULFATE 2.5 MG: 2.5 SOLUTION RESPIRATORY (INHALATION) at 06:34

## 2017-07-07 RX ADMIN — LEVETIRACETAM 1000 MG: 1000 INJECTION, SOLUTION INTRAVENOUS at 21:50

## 2017-07-07 NOTE — PROGRESS NOTES
[facility] Medicine Services  INPATIENT PROGRESS NOTE    Length of Stay: 3  Date of Admission: 7/3/2017  Primary Care Physician: Linda Hoffman, DNP, APRN    Subjective     Chief Complaint:     The patient is intubated and sedated    HPI     The patient developed recurrent diarrhea yesterday.  GI panel shows PCR positive for Clostridium difficile.  He is currently on metronidazole IV.  Temperature is elevated to 100.6.  Blood pressure is stable.  The patient failed to extubate yesterday.  Pulmonology and neurology continues to follow.  EEG is pending.  ABGs and chest x-ray is stable.  Dr. Campos and I discussed the patient's case together this morning.  Pulmonology recommends continuing ventilator until neurological issues are improved.  The patient remains on a Cardene drip for blood pressure control however it has been weaned down to a tiny dose.  Hopefully can discontinue the drip soon.      Review of Systems     Unable to obtain secondary to intubation and sedation  All pertinent negatives and positives are as above. All other systems have been reviewed and are negative unless otherwise stated.     Objective    Temp:  [97.5 °F (36.4 °C)-100.7 °F (38.2 °C)] 100.6 °F (38.1 °C)  Heart Rate:  [] 92  Resp:  [11-20] 11  BP: (111-175)/() 129/74  FiO2 (%):  [30 %] 30 %    Lab Results (last 24 hours)     Procedure Component Value Units Date/Time    Clostridium Difficile Toxin, PCR [[id number]] Collected:  07/06/17 1844    Specimen:  Stool from Per Rectum Updated:  07/06/17 1852    Gastrointestinal Panel, PCR [[id number]]  (Abnormal) Collected:  07/06/17 2327    Specimen:  Stool from Per Rectum Updated:  07/07/17 0124     Campylobacter Not Detected     Clostridium difficile (toxin A/B) Detected (A)        Due to the high asymptomatic carriage rates, especially in young children, the clinical relevance of the detection of toxigenic C. difficile from stool should be considered  in the context of other clinical findings, patient age, and risk factors including hospitalization and antibiotic exposure.        Plesiomonas shigelloides Not Detected     Salmonella Not Detected     Vibrio Not Detected     Vibrio cholerae Not Detected     Yersinia enterocolitica Not Detected     Enteroaggregative E. coli (EAEC) Not Detected     Enteropathogenic E. coli (EPEC) Not Detected     Enterotoxigenic E. coli (ETEC) lt/st Not Detected     Shiga-like toxin-producing E. coli (STEC) stx1/stx2 Not Detected     E. coli O157 Not Detected     Shigella/Enteroinvasive E. coli (EIEC) Not Detected     Cryptosporidium Not Detected     Cyclospora cayetanensis Not Detected     Entamoeba histolytica Not Detected     Giardia lamblia Not Detected     Adenovirus F40/41 Not Detected     Astrovirus Not Detected     Norovirus GI/GII Not Detected     Rotavirus A Not Detected     Sapovirus (I, II, IV or V) Not Detected    Basic Metabolic Panel [[id number]]  (Abnormal) Collected:  07/07/17 0204    Specimen:  Blood Updated:  07/07/17 0317     Glucose 119 (H) mg/dL      BUN 14 mg/dL      Creatinine 1.05 mg/dL      Sodium 142 mmol/L      Potassium 3.2 (L) mmol/L      Chloride 111 (H) mmol/L      CO2 23.0 (L) mmol/L      Calcium 8.0 (L) mg/dL      eGFR Non African Amer 74 mL/min/1.73      BUN/Creatinine Ratio 13.3     Anion Gap 8.0 mmol/L     Narrative:       GFR Normal >60  Chronic Kidney Disease <60  Kidney Failure <15    Blood Gas, Arterial [[id number]]  (Abnormal) Collected:  07/07/17 0339    Specimen:  Arterial Blood Updated:  07/07/17 0343     Site Arterial: right radial     Gera's Test --      Documented in Rapid Comm        pH, Arterial 7.434 pH units      pCO2, Arterial 31.5 (L) mm Hg      pO2, Arterial 76.4 (L) mm Hg      HCO3, Arterial 20.6 (L) mmol/L      Base Excess, Arterial -2.5 (L) mmol/L      O2 Saturation, Arterial 95.8 %      O2 Saturation Calculated 95.8 %      Barometric Pressure for Blood Gas -- mmHg        Component not reported at this site.        Modality Ventilator     FIO2 30 %      Ventilator Mode AC     Rate 10.0 Breaths/minute      PEEP 5.0     Vent CPAP/PEEP 5.0    Narrative:       Serial Number: [id number]    : [id number]          Imaging Results (last 24 hours)     Procedure Component Value Units Date/Time    XR Chest 1 View [[id number]] Collected:  07/07/17 0708     Updated:  07/07/17 0712    Narrative:       EXAMINATION:   XR CHEST 1 VW-  7/7/2017 7:08 AM CDT     HISTORY: Intubated patient     Single view the chest obtained. The lungs are clear. Cardiac silhouettes  mildly enlarged. Endotracheal tube nasogastric tube are satisfactorily  position.     This compared prior study 07/06/2017.     Obdulia and stable single view chest  This report was finalized on 07/07/2017 07:09 by Dr. Adolph Echols MD.             Intake/Output Summary (Last 24 hours) at 07/07/17 1107  Last data filed at 07/07/17 0400   Gross per 24 hour   Intake          4036.35 ml   Output                0 ml   Net          4036.35 ml       Physical Exam    Constitutional: No distress.   Seen and discussed with his nurse, Bao. No family present. He Responds to Ativan sedation well and is no longer pulling at tubes and restraints.. ETT secure, PIVs.    HENT:   Head: Normocephalic and atraumatic.   Eyes: Pupils are equal, round, and reactive to light.   Neck: Neck supple. No JVD present.   Cardiovascular: Normal rate and regular rhythm.   Pulmonary/Chest: Effort normal and breath sounds normal.   Ventilated.    Abdominal: Soft. Bowel sounds are normal.   Musculoskeletal: He exhibits edema (trace).   Neurological:   Currently he is sedated with propofol and Ativan. Dr. Campos and I discussed his case.  He continues to become very agitated when sedation is tapered.   Skin: Skin is warm and dry.          Results Review:  I have reviewed the labs, radiology results, and diagnostic studies since my last progress note and made treatment changes  reflective of the results.   I have reviewed the current medications.    Assessment/Plan     Hospital Problem List     Angio-edema    Seizures      1. Possible angioedema related to lisinopril.  2. Malignant hypertension.  3. Possible new onset seizure disorder versus convulsive syncope.  4. Posterior reversible encephalopathy syndrome on MRI.  5. Tongue bite with sublingual hematoma.  6. Tobacco abuse.  7. Obstructive sleep apnea, noncompliant with device.  8. Gouty arthritis.  9. Daily alcohol use with Delirium tremens requiring sedation and mechanical ventilation.  10. Hypokalemia.   11. C. Difficile colitis    PLAN:  Continue current alcohol withdrawal protocol  Continue Flagyl IV  Increase amlodipine to 10 mg daily     Cristino Encinas,    07/07/17   11:07 AM     Approximately 40 minutes of critical care time were spent managing the patient exclusive of billable procedures.

## 2017-07-07 NOTE — PLAN OF CARE
Problem: Patient Care Overview (Adult)  Goal: Plan of Care Review  Outcome: Ongoing (interventions implemented as appropriate)    07/07/17 0535   Coping/Psychosocial Response Interventions   Plan Of Care Reviewed With patient   Patient Care Overview   Progress no change   Outcome Evaluation   Outcome Summary/Follow up Plan cardene at 25 mg/hr. Tube feed at goal with minimal residual. In/out cath x1. Pt still requiring 75 of propofol but less prn medication        Goal: Adult Individualization and Mutuality  Outcome: Ongoing (interventions implemented as appropriate)  Goal: Discharge Needs Assessment  Outcome: Ongoing (interventions implemented as appropriate)    Problem: Seizure Disorder/Epilepsy (Adult)  Goal: Signs and Symptoms of Listed Potential Problems Will be Absent or Manageable (Seizure Disorder/Epilepsy)  Outcome: Ongoing (interventions implemented as appropriate)    Problem: Fall Risk (Adult)  Goal: Absence of Falls  Outcome: Ongoing (interventions implemented as appropriate)    Problem: Pressure Ulcer Risk (Roman Scale) (Adult,Obstetrics,Pediatric)  Goal: Identify Related Risk Factors and Signs and Symptoms  Outcome: Ongoing (interventions implemented as appropriate)  Goal: Skin Integrity  Outcome: Ongoing (interventions implemented as appropriate)    Problem: SAFETY - NON-VIOLENT RESTRAINT  Goal: Remains free of injury from restraints (Non-Violent Restraint)  Outcome: Ongoing (interventions implemented as appropriate)  Goal: Free from restraint(s) (Non-Violent Restraint)  Outcome: Ongoing (interventions implemented as appropriate)    Problem: Nutrition, Enteral (Adult)  Goal: Signs and Symptoms of Listed Potential Problems Will be Absent or Manageable (Nutrition, Enteral)  Outcome: Ongoing (interventions implemented as appropriate)

## 2017-07-07 NOTE — PLAN OF CARE
Problem: Patient Care Overview (Adult)  Goal: Plan of Care Review  Outcome: Ongoing (interventions implemented as appropriate)    07/07/17 0535   Coping/Psychosocial Response Interventions   Plan Of Care Reviewed With patient   Patient Care Overview   Progress no change       Goal: Adult Individualization and Mutuality  Outcome: Ongoing (interventions implemented as appropriate)  Goal: Discharge Needs Assessment  Outcome: Ongoing (interventions implemented as appropriate)    Problem: Seizure Disorder/Epilepsy (Adult)  Goal: Signs and Symptoms of Listed Potential Problems Will be Absent or Manageable (Seizure Disorder/Epilepsy)  Outcome: Ongoing (interventions implemented as appropriate)    Problem: Fall Risk (Adult)  Goal: Absence of Falls  Outcome: Ongoing (interventions implemented as appropriate)    Problem: Pressure Ulcer Risk (Roman Scale) (Adult,Obstetrics,Pediatric)  Goal: Identify Related Risk Factors and Signs and Symptoms  Outcome: Ongoing (interventions implemented as appropriate)  Goal: Skin Integrity  Outcome: Ongoing (interventions implemented as appropriate)    Problem: SAFETY - NON-VIOLENT RESTRAINT  Goal: Remains free of injury from restraints (Non-Violent Restraint)  Outcome: Ongoing (interventions implemented as appropriate)  Goal: Free from restraint(s) (Non-Violent Restraint)  Outcome: Ongoing (interventions implemented as appropriate)    Problem: Nutrition, Enteral (Adult)  Goal: Signs and Symptoms of Listed Potential Problems Will be Absent or Manageable (Nutrition, Enteral)  Outcome: Ongoing (interventions implemented as appropriate)

## 2017-07-07 NOTE — PROGRESS NOTES
Continued Stay Note  DONELL Rushing     Patient Name: Nishant Savage  MRN: [id number]  Today's Date: 7/7/2017    Admit Date: 7/3/2017          Discharge Plan       07/07/17 0908    Case Management/Social Work Plan    Plan unclear    Additional Comments Patient remains in ICU on vent.  Patient is eligible for [place] Medicaid per [employer], however, this is still pending.  Until Medicaid is approved, patient is without payor source.  This will limit patient's discharge options at this time. Following for medical stability.  Once stable chemical dependency treatment will need to be discussed with patient.                Discharge Codes     None        Expected Discharge Date and Time     Expected Discharge Date Expected Discharge Time    Jul 7, 2017             HENRIETTA Nava

## 2017-07-07 NOTE — NURSING NOTE
Talked to pts family this am. They stated that the pts tongue edema is worse today than it was yesterday

## 2017-07-07 NOTE — PROGRESS NOTES
PULMONARY AND CRITICAL CARE PROGRESS NOTE - [facility]    Patient: Nishant Savage    1965    MR# [id number]    Acct# [id number]  07/07/17   8:39 AM  Referring Provider: Cristino Encinas DO    Chief Complaint: Mechanically ventilated    Interval history: Remains on vent, stirring around with Diprivan infusing. He's getting ready to have an EEG done at bedside. ABG's and CXR stable. No overnight events reported. No family at bedside this morning.     Meds:    albuterol 2.5 mg Nebulization Q6H - RT   amLODIPine 5 mg Per G Tube Q24H   chlorhexidine 15 mL Mouth/Throat Q12H   famotidine 20 mg Intravenous Q12H   levETIRAcetam 1,000 mg Intravenous Q12H   metoprolol tartrate 50 mg Nasogastric Q12H   metroNIDAZOLE 500 mg Intravenous Q8H   IV Fluids 1000 mL + additives 100 mL/hr Intravenous Daily   thiamine (VITAMIN B1) IVPB 100 mg Intravenous Daily       niCARdipine 5-15 mg/hr Last Rate: 2.5 mg/hr (07/07/17 0205)   propofol 5-50 mcg/kg/min Last Rate: 75 mcg/kg/min (07/07/17 0601)     Review of Systems:   Cannot obtain due to mechanical ventilation.  The patient notably is critically ill and connected to a ventilator.  As such patient cannot communicate and provide any history whatsoever, including any history of present illness or interval history since arrival or review of systems. The interested reviewer may note this fact, as an attempt has been made at collecting and documenting these portions of the patient history, but this information is unobtainable despite attempted review and therefore cannot be documented at this time.     Ventilator Settings:  Vent Mode: VC/AC  Vt (Set, L): 0.65 L  Resp Rate (Set): 10  Pressure Support (cm H2O): 0 cm H20  FiO2 (%): 30 %  PEEP/CPAP (cm H2O): 5 cm H20  Minute Ventilation (L/min) (Obs): 14.5 L/min  Resp Rate (Observed) Vent: 20  I:E Ratio (Set): 1:1.20  I:E Ratio (Obs): 1:1.5  PIP Observed (cm H2O): 26 cm H2O  RSBI: 202    Physical Exam:  Temp:  [97.5 °F  (36.4 °C)-100.7 °F (38.2 °C)] 100.6 °F (38.1 °C)  Heart Rate:  [] 105  Resp:  [11-20] 11  BP: (111-178)/() 155/84  FiO2 (%):  [30 %] 30 %  Intake/Output Summary (Last 24 hours) at 07/07/17 0839  Last data filed at 07/07/17 0400   Gross per 24 hour   Intake          4036.35 ml   Output                0 ml   Net          4036.35 ml     SpO2 Readings from Last 3 Encounters:   07/07/17 97%   09/30/16 98%   02/04/16 96%     Physical Exam   Gen.: Sedated and intubated on mechanical ventilatory support.  HEENT: Endotracheal tube is in place. Tongue is swollen and bruised.  Neck: Supple.  Respiratory: Fair air movement bilaterally with coarse sounds in upper lobes.   Cardiac: Sinus tach. No murmur or gallop noted.   Abdomen: Soft, distended, bowel sounds present with nutrition infusing.   Neurologic: He is stirring around some.   Psychiatric: Cannot assess as he is sedated and intubated.  Dermatologic: No rash noted.  Extremities: SCDs are in place.  Musculoskeletal: No joint deformities noted.  Lymphatic: No adenopathy palpated    Results from last 7 days  Lab Units 07/06/17  0222 07/05/17  0158 07/03/17  0807   WBC 10*3/mm3 9.27 15.95* 11.95*   HEMOGLOBIN g/dL 12.1* 12.8* 11.9*   PLATELETS 10*3/mm3 216 246 245       Results from last 7 days  Lab Units 07/07/17  0204 07/06/17  0222 07/05/17  0158   SODIUM mmol/L 142 142 143   POTASSIUM mmol/L 3.2* 3.0* 3.7   BUN mg/dL 14 15 16   CREATININE mg/dL 1.05 0.98 1.10       Results from last 7 days  Lab Units 07/07/17  0339 07/06/17  0356 07/05/17  0322   PH, ARTERIAL pH units 7.434 7.430 7.414   PCO2, ARTERIAL mm Hg 31.5* 35.9 43.0   PO2 ART mm Hg 76.4* 95.6 86.5   FIO2 % 30 30 30        Recent films:  Imaging Results (last 24 hours)     Procedure Component Value Units Date/Time    XR Chest 1 View [[id number]] Collected:  07/07/17 0708     Updated:  07/07/17 0712    Narrative:       EXAMINATION:   XR CHEST 1 VW-  7/7/2017 7:08 AM CDT     HISTORY: Intubated patient        Single view the chest obtained. The lungs are clear. Cardiac silhouettes  mildly enlarged. Endotracheal tube nasogastric tube are satisfactorily  position.     This compared prior study 07/06/2017.     Obdulia and stable single view chest  This report was finalized on 07/07/2017 07:09 by Dr. Adolph Echols MD.        Films reviewed personally by me.  My interpretation: Stable from previous    Pulmonary Assessment:  1. Respiratory failure related to agitation from ethanol withdrawal.  2. Recent problems with angioedema which have resolved  3. History of chronic ethanol use  4. C-diff  5. Tongue hematoma       Recommend:   · Continue vent support until neurological issues are determined. Will defer that determination to Dr. Campos.  · Continue nebs  · Ativan PRN for withdrawal    Electronically signed by EPI Lincoln on 7/7/2017 at 8:39 AM    Physician substantive contribution:  Pertinent symptoms/interval history include: He remains sedated and intubated on mechanical ventilatory support  Respiratory exam shows pertinent findings of clear lung fields on chest exam  Plan includes: We will assess him regarding ventilator weaning when his neurologic status hopefully permits.  I have seen and examined patient personally, performing a face-to-face diagnostic evaluation with plan of care reviewed and developed with APRN and nursing staff. I have addended and/or modified the above history of present illness, physical examination, and assessment and plan to reflect my findings and impressions. Essential elements of the care plan were discussed with APRN above.  Agree with findings and assessment/plan as documented above.    Electronically signed by Indra Gallo MD, on 7/7/2017, 5:05 PM

## 2017-07-07 NOTE — PROGRESS NOTES
Neurology Progress Note      Chief Complaint:  PRES, EtOH w/d, Seizure    Subjective     Subjective:  Overnight, he remained agitations at times.  Now on Ativan as well as Diprovan.   Fighting against restraints.  BPS have been better overnight, DBPs 80s-90s.  Not following commands when more awake.  Now has C.diff.  To unstable to extubate yesterday.    Medications:  Current Facility-Administered Medications   Medication Dose Route Frequency Provider Last Rate Last Dose   • acetaminophen (TYLENOL) tablet 650 mg  650 mg Oral Q4H PRN Jairon Perez DO   650 mg at 07/03/17 2010   • albuterol (PROVENTIL) nebulizer solution 0.083% 2.5 mg/3mL  2.5 mg Nebulization Q6H - RT Jairon Perez DO   2.5 mg at 07/07/17 0634   • amLODIPine (NORVASC) tablet 5 mg  5 mg Per G Tube Q24H Cristino Encinas, DO   5 mg at 07/06/17 1151   • chlorhexidine (PERIDEX) 0.12 % solution 15 mL  15 mL Mouth/Throat Q12H Jairon Perez, DO   15 mL at 07/06/17 2134   • famotidine (PEPCID) injection 20 mg  20 mg Intravenous Q12H Jairon Perez DO   20 mg at 07/06/17 2012   • haloperidol lactate (HALDOL) injection 5 mg  5 mg Intravenous Q6H PRN Cristino Encinas DO       • HYDROcodone-acetaminophen (NORCO)  MG per tablet 1 tablet  1 tablet Oral Q4H PRN Jairon Perez DO   1 tablet at 07/03/17 1758   • HYDROcodone-acetaminophen (NORCO) 5-325 MG per tablet 1 tablet  1 tablet Oral Q4H PRN Jairon Perez DO       • HYDROmorphone (DILAUDID) injection 1 mg  1 mg Intravenous Q3H PRN Jairon Perez DO   1 mg at 07/06/17 2323    And   • naloxone (NARCAN) injection 0.4 mg  0.4 mg Intravenous Q5 Min PRN Jairon Perez DO       • levETIRAcetam in NaCl 0.75% (KEPPRA) IVPB 1,000 mg  1,000 mg Intravenous Q12H Royal Campos MD       • LORazepam (ATIVAN) tablet 1 mg  1 mg Oral Q2H PRN Cristino Encinas, DO        Or   • LORazepam (ATIVAN) injection 1 mg  1 mg Intravenous Q2H PRN Cristino Encinas, DO        Or   • LORazepam (ATIVAN) tablet 2 mg   2 mg Oral Q1H PRN Cristino Encinas, DO        Or   • LORazepam (ATIVAN) injection 2 mg  2 mg Intravenous Q1H PRN Cristino Encinas, DO   2 mg at 07/06/17 2244    Or   • LORazepam (ATIVAN) injection 2 mg  2 mg Intravenous Q15 Min PRN Cristino Encinas, DO        Or   • LORazepam (ATIVAN) injection 2 mg  2 mg Intramuscular Q15 Min PRN Cristino Encinas DO        Or   • LORazepam (ATIVAN) tablet 4 mg  4 mg Oral Q1H PRN Cristino Encinas, DO        Or   • LORazepam (ATIVAN) injection 4 mg  4 mg Intravenous Q1H PRN Cristino Encinas, DO   4 mg at 07/06/17 2032   • metoprolol tartrate (LOPRESSOR) tablet 50 mg  50 mg Nasogastric Q12H Jairon Perez DO   50 mg at 07/06/17 2012   • metroNIDAZOLE (FLAGYL) IVPB 500 mg  500 mg Intravenous Q8H Robe Dorsey, DO   500 mg at 07/07/17 0214   • multiple vitamin (M.V.I. Adult) 10 mL, thiamine (B-1) 100 mg, folic acid 1 mg, magnesium sulfate 2 g in sodium chloride 0.9 % 1,000 mL infusion  100 mL/hr Intravenous Daily Cristino Encinas DO   Stopped at 07/07/17 0514   • niCARdipine (CARDENE) 25 mg/250 mL (0.1 mg/mL) 0.9% NS infusion  5-15 mg/hr Intravenous Titrated Jairon Perez DO 25 mL/hr at 07/07/17 0205 2.5 mg/hr at 07/07/17 0205   • ondansetron (ZOFRAN) injection 4 mg  4 mg Intravenous Q6H PRN Jairon Perez DO       • propofol (DIPRIVAN) infusion 10 mg/mL 100 mL  5-50 mcg/kg/min Intravenous Titrated Jairon Perez DO 37.3 mL/hr at 07/07/17 0601 75 mcg/kg/min at 07/07/17 0601   • sodium chloride 0.9 % flush 1-10 mL  1-10 mL Intravenous PRN Jairon Perez DO       • thiamine (B-1) 100 mg in sodium chloride 0.9 % 100 mL IVPB  100 mg Intravenous Daily Jairon Perez  mL/hr at 07/06/17 0809 100 mg at 07/06/17 0809       Review of Systems:   Unable to obtain      Objective      Vital Signs  Temp:  [97.5 °F (36.4 °C)-100.7 °F (38.2 °C)] 100.5 °F (38.1 °C)  Heart Rate:  [] 90  Resp:  [11-20] 11  BP: (111-178)/() 133/74  FiO2 (%):  [30 %] 30  %    Physical Exam:  Sedated, Intubated  NC/AT, tongue swelling persists  CTAB  RRR  Abdomen distended  2+ edema in the legs, 1+ in the hands  Skin warm and dry    Neuro:  Not responsive.  Tries to open eyes to loud stimulus, painful stimuli  CN:  PERRL, Positive Doll's, Positive Blink  No w/d to pain X 4  No spontaneous movment     Results Review:    I reviewed the patient's new clinical results.      Results from last 7 days  Lab Units 07/06/17 0222 07/05/17 0158 07/03/17  0807   WBC 10*3/mm3 9.27 15.95* 11.95*   HEMOGLOBIN g/dL 12.1* 12.8* 11.9*   HEMATOCRIT % 35.4* 38.1* 34.1*   PLATELETS 10*3/mm3 216 246 245          Results from last 7 days  Lab Units 07/07/17  0204 07/06/17 0222 07/05/17 0158  07/03/17  0807   SODIUM mmol/L 142 142 143  < > 141   POTASSIUM mmol/L 3.2* 3.0* 3.7  < > 3.0*   CHLORIDE mmol/L 111* 108 105  < > 102   CO2 mmol/L 23.0* 27.0 28.0  < > 29.0   BUN mg/dL 14 15 16  < > 9   CREATININE mg/dL 1.05 0.98 1.10  < > 1.19   CALCIUM mg/dL 8.0* 8.4 9.1  < > 8.8   BILIRUBIN mg/dL  --   --   --   --  0.6   ALK PHOS U/L  --   --   --   --  69   ALT (SGPT) U/L  --   --   --   --  39   AST (SGOT) U/L  --   --   --   --  72*   GLUCOSE mg/dL 119* 87 96  < > 127*   < > = values in this interval not displayed.     Lab Results   Component Value Date    MG 2.1 07/06/2017     No components found for: POCGLUC  No components found for: A1C  Lab Results   Component Value Date    HDL 36 02/04/2016     No components found for: B12  No results found for: TSH    Assessment/Plan     Hospital Problem List    Active Problems:    Angio-edema    Seizures    Impression  52 year old admitted with seizures, malignant HTN, PRES, EtOH w/d. Agitation, intubated, sedated      Plan  1. PRES  --BPS are better   --Medicine attempting to wean cardene with additional of BP meds   --No overt seizure activity   --EEG today to rule out status   --Increase Keppra to 1000mg IV BID      2. DTs  --On Ativan, sedated  --Intubated  --On  Thiamine, FA      3. Tongue swelling: ENT has signed off. Currently intubated, more difficult to assess.  Angioedema +/- tongue biting from seizure      At this point, I suspect multiple issues are contributing to his AMS including PRES and W/D.  Will check EEG today to rule out status although on high dose propofol, Ativan and Keppra, I doubt.  I will increase Keppra to 1000mg to cover empirically.  Will follow closely.          Royal Campos MD  07/07/17  7:27 AM

## 2017-07-07 NOTE — NURSING NOTE
Dr. Sandoval at bedside. Examined the pts tongue because black and odorous. Was going to do soft tissue scan and consult ENT saw where they had already been consulted and had signed off. No new orders

## 2017-07-08 ENCOUNTER — APPOINTMENT (OUTPATIENT)
Dept: CT IMAGING | Facility: HOSPITAL | Age: 52
End: 2017-07-08

## 2017-07-08 ENCOUNTER — APPOINTMENT (OUTPATIENT)
Dept: GENERAL RADIOLOGY | Facility: HOSPITAL | Age: 52
End: 2017-07-08

## 2017-07-08 LAB
ANION GAP SERPL CALCULATED.3IONS-SCNC: 11 MMOL/L (ref 4–13)
ARTERIAL PATENCY WRIST A: ABNORMAL
ATMOSPHERIC PRESS: ABNORMAL MMHG
BASE EXCESS BLDA CALC-SCNC: -1.1 MMOL/L (ref -2–2)
BASOPHILS # BLD AUTO: 0.02 10*3/MM3 (ref 0–0.2)
BASOPHILS NFR BLD AUTO: 0.2 % (ref 0–2)
BDY SITE: ABNORMAL
BUN BLD-MCNC: 11 MG/DL (ref 5–21)
BUN/CREAT SERPL: 11.2 (ref 7–25)
CALCIUM SPEC-SCNC: 8 MG/DL (ref 8.4–10.4)
CHLORIDE SERPL-SCNC: 111 MMOL/L (ref 98–110)
CO2 SERPL-SCNC: 21 MMOL/L (ref 24–31)
CREAT BLD-MCNC: 0.98 MG/DL (ref 0.5–1.4)
DEPRECATED RDW RBC AUTO: 48.3 FL (ref 40–54)
EOSINOPHIL # BLD AUTO: 0.32 10*3/MM3 (ref 0–0.7)
EOSINOPHIL NFR BLD AUTO: 2.6 % (ref 0–4)
ERYTHROCYTE [DISTWIDTH] IN BLOOD BY AUTOMATED COUNT: 14.6 % (ref 12–15)
GFR SERPL CREATININE-BSD FRML MDRD: 81 ML/MIN/1.73
GLUCOSE BLD-MCNC: 131 MG/DL (ref 70–100)
HCO3 BLDA-SCNC: 22.8 MMOL/L (ref 22–26)
HCT VFR BLD AUTO: 31.5 % (ref 40–52)
HGB BLD-MCNC: 10.4 G/DL (ref 14–18)
HOROWITZ INDEX BLD+IHG-RTO: 30 %
IMM GRANULOCYTES # BLD: 0.12 10*3/MM3 (ref 0–0.03)
IMM GRANULOCYTES NFR BLD: 1 % (ref 0–5)
LYMPHOCYTES # BLD AUTO: 1.44 10*3/MM3 (ref 0.72–4.86)
LYMPHOCYTES NFR BLD AUTO: 11.7 % (ref 15–45)
MCH RBC QN AUTO: 29.8 PG (ref 28–32)
MCHC RBC AUTO-ENTMCNC: 33 G/DL (ref 33–36)
MCV RBC AUTO: 90.3 FL (ref 82–95)
MODALITY: ABNORMAL
MONOCYTES # BLD AUTO: 1.65 10*3/MM3 (ref 0.19–1.3)
MONOCYTES NFR BLD AUTO: 13.4 % (ref 4–12)
NEUTROPHILS # BLD AUTO: 8.75 10*3/MM3 (ref 1.87–8.4)
NEUTROPHILS NFR BLD AUTO: 71.1 % (ref 39–78)
PCO2 BLDA: 35.8 MM HG (ref 35–45)
PEEP RESPIRATORY: 5 CM[H2O]
PH BLDA: 7.42 PH UNITS (ref 7.35–7.45)
PLATELET # BLD AUTO: 199 10*3/MM3 (ref 130–400)
PMV BLD AUTO: 9.8 FL (ref 6–12)
PO2 BLDA: 71.1 MM HG (ref 80–100)
POTASSIUM BLD-SCNC: 3.5 MMOL/L (ref 3.5–5.3)
RBC # BLD AUTO: 3.49 10*6/MM3 (ref 4.8–5.9)
SAO2 % BLDCOA: 94.7 % (ref 94–100)
SAO2 % BLDCOA: 94.7 % (ref 94–100)
SODIUM BLD-SCNC: 143 MMOL/L (ref 135–145)
TOTAL RATE: 10 BREATHS/MINUTE
VENT CPAP/PEEP: 5
VENTILATOR MODE: ABNORMAL
WBC NRBC COR # BLD: 12.3 10*3/MM3 (ref 4.8–10.8)

## 2017-07-08 PROCEDURE — 87185 SC STD ENZYME DETCJ PER NZM: CPT | Performed by: INTERNAL MEDICINE

## 2017-07-08 PROCEDURE — 25010000003 LEVETIRACETAM IN NACL 0.75% 1000 MG/100ML SOLUTION: Performed by: PSYCHIATRY & NEUROLOGY

## 2017-07-08 PROCEDURE — 94799 UNLISTED PULMONARY SVC/PX: CPT

## 2017-07-08 PROCEDURE — 80048 BASIC METABOLIC PNL TOTAL CA: CPT | Performed by: FAMILY MEDICINE

## 2017-07-08 PROCEDURE — 99232 SBSQ HOSP IP/OBS MODERATE 35: CPT | Performed by: PSYCHIATRY & NEUROLOGY

## 2017-07-08 PROCEDURE — 25010000002 THIAMINE PER 100 MG: Performed by: INTERNAL MEDICINE

## 2017-07-08 PROCEDURE — 25010000002 PROPOFOL 1000 MG/ML EMULSION: Performed by: INTERNAL MEDICINE

## 2017-07-08 PROCEDURE — 25010000002 VANCOMYCIN PER 500 MG: Performed by: FAMILY MEDICINE

## 2017-07-08 PROCEDURE — 25010000002 CEFTRIAXONE: Performed by: FAMILY MEDICINE

## 2017-07-08 PROCEDURE — 94003 VENT MGMT INPAT SUBQ DAY: CPT

## 2017-07-08 PROCEDURE — 25010000002 THIAMINE PER 100 MG: Performed by: FAMILY MEDICINE

## 2017-07-08 PROCEDURE — 0 IOPAMIDOL PER 1 ML: Performed by: FAMILY MEDICINE

## 2017-07-08 PROCEDURE — 94640 AIRWAY INHALATION TREATMENT: CPT

## 2017-07-08 PROCEDURE — 25010000002 PIPERACILLIN SOD-TAZOBACTAM PER 1 G: Performed by: FAMILY MEDICINE

## 2017-07-08 PROCEDURE — 25010000002 MAGNESIUM SULFATE PER 500 MG OF MAGNESIUM: Performed by: FAMILY MEDICINE

## 2017-07-08 PROCEDURE — 25010000002 LORAZEPAM PER 2 MG: Performed by: FAMILY MEDICINE

## 2017-07-08 PROCEDURE — 87070 CULTURE OTHR SPECIMN AEROBIC: CPT | Performed by: INTERNAL MEDICINE

## 2017-07-08 PROCEDURE — 87205 SMEAR GRAM STAIN: CPT | Performed by: INTERNAL MEDICINE

## 2017-07-08 PROCEDURE — 70470 CT HEAD/BRAIN W/O & W/DYE: CPT

## 2017-07-08 PROCEDURE — 70492 CT SFT TSUE NCK W/O & W/DYE: CPT

## 2017-07-08 PROCEDURE — 94770: CPT

## 2017-07-08 PROCEDURE — 25010000002 HYDROMORPHONE PER 4 MG: Performed by: INTERNAL MEDICINE

## 2017-07-08 PROCEDURE — 87147 CULTURE TYPE IMMUNOLOGIC: CPT | Performed by: INTERNAL MEDICINE

## 2017-07-08 PROCEDURE — 82803 BLOOD GASES ANY COMBINATION: CPT

## 2017-07-08 PROCEDURE — 87186 SC STD MICRODIL/AGAR DIL: CPT | Performed by: INTERNAL MEDICINE

## 2017-07-08 PROCEDURE — 25010000002 ENOXAPARIN PER 10 MG: Performed by: FAMILY MEDICINE

## 2017-07-08 PROCEDURE — 25010000002 HALOPERIDOL LACTATE PER 5 MG: Performed by: FAMILY MEDICINE

## 2017-07-08 PROCEDURE — 85025 COMPLETE CBC W/AUTO DIFF WBC: CPT | Performed by: FAMILY MEDICINE

## 2017-07-08 PROCEDURE — 71010 HC CHEST PA OR AP: CPT

## 2017-07-08 PROCEDURE — 36600 WITHDRAWAL OF ARTERIAL BLOOD: CPT

## 2017-07-08 PROCEDURE — 62270 DX LMBR SPI PNXR: CPT | Performed by: PSYCHIATRY & NEUROLOGY

## 2017-07-08 RX ORDER — DEXTROMETHORPHAN POLISTIREX 30 MG/5ML
30 SUSPENSION ORAL 2 TIMES DAILY
Status: DISCONTINUED | OUTPATIENT
Start: 2017-07-08 | End: 2017-07-18

## 2017-07-08 RX ORDER — VANCOMYCIN HYDROCHLORIDE 1 G/200ML
1000 INJECTION, SOLUTION INTRAVENOUS EVERY 12 HOURS
Status: DISCONTINUED | OUTPATIENT
Start: 2017-07-08 | End: 2017-07-08 | Stop reason: ALTCHOICE

## 2017-07-08 RX ADMIN — PROPOFOL 50 MCG/KG/MIN: 10 INJECTION, EMULSION INTRAVENOUS at 00:27

## 2017-07-08 RX ADMIN — ALBUTEROL SULFATE 2.5 MG: 2.5 SOLUTION RESPIRATORY (INHALATION) at 00:01

## 2017-07-08 RX ADMIN — TAZOBACTAM SODIUM AND PIPERACILLIN SODIUM 3.38 G: 375; 3 INJECTION, SOLUTION INTRAVENOUS at 17:37

## 2017-07-08 RX ADMIN — TAZOBACTAM SODIUM AND PIPERACILLIN SODIUM 3.38 G: 375; 3 INJECTION, SOLUTION INTRAVENOUS at 12:42

## 2017-07-08 RX ADMIN — NICARDIPINE HYDROCHLORIDE 5 MG/HR: 25 INJECTION INTRAVENOUS at 12:42

## 2017-07-08 RX ADMIN — HYDROMORPHONE HYDROCHLORIDE 1 MG: 1 INJECTION, SOLUTION INTRAMUSCULAR; INTRAVENOUS; SUBCUTANEOUS at 19:59

## 2017-07-08 RX ADMIN — ALBUTEROL SULFATE 2.5 MG: 2.5 SOLUTION RESPIRATORY (INHALATION) at 11:24

## 2017-07-08 RX ADMIN — PROPOFOL 75 MCG/KG/MIN: 10 INJECTION, EMULSION INTRAVENOUS at 04:27

## 2017-07-08 RX ADMIN — METOPROLOL TARTRATE 50 MG: 50 TABLET ORAL at 08:17

## 2017-07-08 RX ADMIN — CHLORHEXIDINE GLUCONATE 15 ML: 1.2 RINSE ORAL at 08:26

## 2017-07-08 RX ADMIN — PROPOFOL 75 MCG/KG/MIN: 10 INJECTION, EMULSION INTRAVENOUS at 07:30

## 2017-07-08 RX ADMIN — DEXTROMETHORPHAN 30 MG: 30 SUSPENSION, EXTENDED RELEASE ORAL at 17:37

## 2017-07-08 RX ADMIN — AMLODIPINE BESYLATE 10 MG: 10 TABLET ORAL at 08:17

## 2017-07-08 RX ADMIN — PROPOFOL 75 MCG/KG/MIN: 10 INJECTION, EMULSION INTRAVENOUS at 13:25

## 2017-07-08 RX ADMIN — THIAMINE HYDROCHLORIDE 100 ML/HR: 100 INJECTION, SOLUTION INTRAMUSCULAR; INTRAVENOUS at 08:18

## 2017-07-08 RX ADMIN — FAMOTIDINE 20 MG: 10 INJECTION INTRAVENOUS at 20:30

## 2017-07-08 RX ADMIN — FAMOTIDINE 20 MG: 10 INJECTION INTRAVENOUS at 08:18

## 2017-07-08 RX ADMIN — PROPOFOL 70 MCG/KG/MIN: 10 INJECTION, EMULSION INTRAVENOUS at 18:35

## 2017-07-08 RX ADMIN — ALBUTEROL SULFATE 2.5 MG: 2.5 SOLUTION RESPIRATORY (INHALATION) at 06:43

## 2017-07-08 RX ADMIN — METOPROLOL TARTRATE 50 MG: 50 TABLET ORAL at 20:30

## 2017-07-08 RX ADMIN — CEFTRIAXONE 2 G: 2 INJECTION, POWDER, FOR SOLUTION INTRAMUSCULAR; INTRAVENOUS at 20:52

## 2017-07-08 RX ADMIN — VANCOMYCIN HYDROCHLORIDE 2000 MG: 1 INJECTION, POWDER, LYOPHILIZED, FOR SOLUTION INTRAVENOUS at 21:25

## 2017-07-08 RX ADMIN — NICARDIPINE HYDROCHLORIDE 12.5 MG/HR: 25 INJECTION INTRAVENOUS at 02:47

## 2017-07-08 RX ADMIN — ALBUTEROL SULFATE 2.5 MG: 2.5 SOLUTION RESPIRATORY (INHALATION) at 18:41

## 2017-07-08 RX ADMIN — PROPOFOL 70 MCG/KG/MIN: 10 INJECTION, EMULSION INTRAVENOUS at 16:10

## 2017-07-08 RX ADMIN — HALOPERIDOL LACTATE 5 MG: 5 INJECTION, SOLUTION INTRAMUSCULAR at 04:20

## 2017-07-08 RX ADMIN — METRONIDAZOLE 500 MG: 500 INJECTION, SOLUTION INTRAVENOUS at 02:09

## 2017-07-08 RX ADMIN — ENOXAPARIN SODIUM 40 MG: 40 INJECTION SUBCUTANEOUS at 08:26

## 2017-07-08 RX ADMIN — NICARDIPINE HYDROCHLORIDE 15 MG/HR: 25 INJECTION INTRAVENOUS at 06:27

## 2017-07-08 RX ADMIN — CHLORHEXIDINE GLUCONATE 15 ML: 1.2 RINSE ORAL at 20:30

## 2017-07-08 RX ADMIN — METRONIDAZOLE 500 MG: 500 INJECTION, SOLUTION INTRAVENOUS at 10:51

## 2017-07-08 RX ADMIN — LEVETIRACETAM 1000 MG: 1000 INJECTION, SOLUTION INTRAVENOUS at 20:30

## 2017-07-08 RX ADMIN — HYDROMORPHONE HYDROCHLORIDE 1 MG: 1 INJECTION, SOLUTION INTRAMUSCULAR; INTRAVENOUS; SUBCUTANEOUS at 03:06

## 2017-07-08 RX ADMIN — NICARDIPINE HYDROCHLORIDE 15 MG/HR: 25 INJECTION INTRAVENOUS at 04:45

## 2017-07-08 RX ADMIN — DEXTROMETHORPHAN 30 MG: 30 SUSPENSION, EXTENDED RELEASE ORAL at 08:18

## 2017-07-08 RX ADMIN — NICARDIPINE HYDROCHLORIDE 7.5 MG/HR: 25 INJECTION INTRAVENOUS at 00:13

## 2017-07-08 RX ADMIN — IOPAMIDOL 100 ML: 755 INJECTION, SOLUTION INTRAVENOUS at 12:30

## 2017-07-08 RX ADMIN — LORAZEPAM 4 MG: 2 INJECTION INTRAMUSCULAR at 02:48

## 2017-07-08 RX ADMIN — METRONIDAZOLE 500 MG: 500 INJECTION, SOLUTION INTRAVENOUS at 17:38

## 2017-07-08 RX ADMIN — PROPOFOL 75 MCG/KG/MIN: 10 INJECTION, EMULSION INTRAVENOUS at 10:51

## 2017-07-08 RX ADMIN — PROPOFOL 70 MCG/KG/MIN: 10 INJECTION, EMULSION INTRAVENOUS at 21:31

## 2017-07-08 RX ADMIN — THIAMINE HYDROCHLORIDE 100 MG: 100 INJECTION, SOLUTION INTRAMUSCULAR; INTRAVENOUS at 08:17

## 2017-07-08 RX ADMIN — NICARDIPINE HYDROCHLORIDE 10 MG/HR: 25 INJECTION INTRAVENOUS at 09:50

## 2017-07-08 RX ADMIN — LORAZEPAM 4 MG: 2 INJECTION INTRAMUSCULAR at 09:26

## 2017-07-08 RX ADMIN — LEVETIRACETAM 1000 MG: 1000 INJECTION, SOLUTION INTRAVENOUS at 08:17

## 2017-07-08 NOTE — PROGRESS NOTES
PULMONARY AND CRITICAL CARE PROGRESS NOTE - [facility]    Patient: Nishant Savage    1965    MR# [id number]    Acct# [id number]  07/08/17   10:14 AM  Referring Provider: Cristino Encinas DO    Chief Complaint: Mechanically ventilated    Interval history: Remains on ventilator with propofol gtt infusing.  Cardene is currently on hold.  RN reports he was off propofol for about an hour 2' to IV lines infiltrating and the patient was very agitated and would not follow commands.  RN also reports patient has required and average of ativan 4mg IV 4-5x daily.  Green secretions noted per nursing, respiratory culture pending.  Currently FiO2 30% with O2 sat 93%.  No other aggravating or allevitating factors.         Meds:    albuterol 2.5 mg Nebulization Q6H - RT   amLODIPine 10 mg Per G Tube Q24H   chlorhexidine 15 mL Mouth/Throat Q12H   dextromethorphan polistirex ER 30 mg Oral BID   enoxaparin 40 mg Subcutaneous Daily   famotidine 20 mg Intravenous Q12H   levETIRAcetam 1,000 mg Intravenous Q12H   metoprolol tartrate 50 mg Nasogastric Q12H   metroNIDAZOLE 500 mg Intravenous Q8H   piperacillin-tazobactam 3.375 g Intravenous Q6H   thiamine (VITAMIN B1) IVPB 100 mg Intravenous Daily       niCARdipine 5-15 mg/hr Last Rate: 10 mg/hr (07/08/17 0950)   propofol 5-50 mcg/kg/min Last Rate: 75 mcg/kg/min (07/08/17 0730)     Review of Systems:   Cannot obtain due to mechanical ventilation.  The patient notably is critically ill and connected to a ventilator.  As such patient cannot communicate and provide any history whatsoever, including any history of present illness or interval history since arrival or review of systems. The interested reviewer may note this fact, as an attempt has been made at collecting and documenting these portions of the patient history, but this information is unobtainable despite attempted review and therefore cannot be documented at this time.     Ventilator Settings:  Vent Mode:  VC/AC  Vt (Set, L): 0.65 L  Resp Rate (Set): 10  Pressure Support (cm H2O): 0 cm H20  FiO2 (%): 30 %  PEEP/CPAP (cm H2O): 5 cm H20  Minute Ventilation (L/min) (Obs): 9.06 L/min  Resp Rate (Observed) Vent: 14  I:E Ratio (Set): 1:1.90  I:E Ratio (Obs): 1:1.5  PIP Observed (cm H2O): 34 cm H2O  RSBI: 202    Physical Exam:  Temp:  [98.8 °F (37.1 °C)-99.8 °F (37.7 °C)] 98.8 °F (37.1 °C)  Heart Rate:  [] 77  Resp:  [16-21] 16  BP: (117-182)/() 155/89  FiO2 (%):  [30 %] 30 %    Intake/Output Summary (Last 24 hours) at 07/08/17 1014  Last data filed at 07/08/17 0400   Gross per 24 hour   Intake           3240.8 ml   Output             1550 ml   Net           1690.8 ml     SpO2 Readings from Last 3 Encounters:   07/08/17 96%   09/30/16 98%   02/04/16 96%     Physical Exam   Gen.: Sedated and intubated on mechanical ventilatory support.  HEENT: Endotracheal tube is in place. Tongue is swollen and bruised.  Neck: Supple.  Respiratory: Fair air movement bilaterally, no wheezing or rhonchi.   Cardiac: Sinus rhythm. No murmur or gallop noted.   Abdomen: Soft, distended, bowel sounds present with nutrition infusing.   Neurologic: He is stirring around some when touched.   Psychiatric: Cannot assess as he is sedated and intubated.  Dermatologic: No rash noted.  Extremities: SCDs are in place.  Musculoskeletal: No joint deformities noted.  Lymphatic: No adenopathy palpated    Results from last 7 days  Lab Units 07/06/17  0222 07/05/17  0158 07/03/17  0807   WBC 10*3/mm3 9.27 15.95* 11.95*   HEMOGLOBIN g/dL 12.1* 12.8* 11.9*   PLATELETS 10*3/mm3 216 246 245       Results from last 7 days  Lab Units 07/08/17  0411 07/07/17  0204 07/06/17  0222   SODIUM mmol/L 143 142 142   POTASSIUM mmol/L 3.5 3.2* 3.0*   BUN mg/dL 11 14 15   CREATININE mg/dL 0.98 1.05 0.98       Results from last 7 days  Lab Units 07/08/17  0214 07/07/17  0339 07/06/17  0356   PH, ARTERIAL pH units 7.422 7.434 7.430   PCO2, ARTERIAL mm Hg 35.8 31.5* 35.9      PO2 ART mm Hg 71.1* 76.4* 95.6   FIO2 % 30 30 30        Recent films:  Imaging Results (last 24 hours)     Procedure Component Value Units Date/Time    XR Chest 1 View [[id number]] Collected:  07/07/17 2137     Updated:  07/07/17 2153    Narrative:       EXAMINATION: XR CHEST 1 VW- 7/7/2017 9:31 PM CDT     HISTORY: Central line placement.     COMPARISON: 07/07/2017.     FINDINGS:  The left subclavian central line has been placed with tip in the  proximal SVC. No pneumothorax is identified. The endotracheal and  enteric tubes remain in place. Scattered areas of subsegmental  atelectasis are present. The heart is magnified but felt to be mildly  enlarged. The pulmonary vasculature is stable.       Impression:       Interval placement of left subclavian central line without  evidence of pneumothorax.  This report was finalized on 07/07/2017 21:50 by Dr. Driss Ward MD.    US Venous Doppler Upper Extremity Left (duplex) [[id number]] Collected:  07/08/17 0719     Updated:  07/08/17 0723    Narrative:       EXAMINATION:   US VENOUS DOPPLER UPPER EXTREMITY LEFT (DUPLEX)-   7/8/2017 7:19 AM CDT     HISTORY: Venous Doppler analysis of the left upper extremity.     Irineo vein demonstrates compression and color flow.     Left subclavian vein demonstrates color flow and augmentation.     Axillary vein demonstrates compression color flow and augmentation.     The brachial vein demonstrates color flow and compression. The left  basilic vein demonstrates lack of compression above the elbow. This lack  of compression is consistent with thrombus in the basilic vein. The  cephalic vein demonstrates color flow compression. The radial vein  demonstrates color flow compression. The ulnar vein demonstrates color  flow compression.       Impression:       Thrombus is visualized in the left basilic vein above the  elbow.  This report was finalized on 07/08/2017 07:20 by Dr. Adolph Echols MD.    US Arterial Doppler Upper Extremity Left  [[id number]] Collected:  07/08/17 0720     Updated:  07/08/17 0725    Narrative:       EXAMINATION:   US ARTERIAL DOPPLER UPPER EXTREMITY  LEFT-  7/8/2017 7:20  AM CDT     HISTORY: Left upper extremity duplex arterial analysis     On this examination the left subclavian artery demonstrates color flow  and peak systolic velocity 45 cm/s. Axillary artery demonstrates color  flow with a peak systolic velocity 104 cm/s. The brachial artery  demonstrates peak systolic velocity 130 cm/s with triphasic waveform.  The radial artery demonstrates color flow and triphasic waveform peak  systolic velocity 78 cm/s. The ulnar artery demonstrates triphasic  waveform color flow and peak systolic velocity 67 cm/s.       Impression:       Negative left upper extremity arterial duplex ultrasound  This report was finalized on 07/08/2017 07:22 by Dr. Adolph Echols MD.    XR Chest 1 View [[id number]] Collected:  07/08/17 0846     Updated:  07/08/17 0935    Narrative:       EXAMINATION:   XR CHEST 1 VW-  7/8/2017 8:45 AM CDT     HISTORY: Frontal supine radiograph of the chest 7/8/2017 3:22 AM CDT     COMPARISON: 07/07/2017.     FINDINGS:   The lungs are clear. The cardiomediastinal silhouette and pulmonary  vascularity are within normal limits. Endotracheal tube is  satisfactorily positioned. Left subclavian catheter is unchanged.     The osseous structures and surrounding soft tissues demonstrate no acute  abnormality.       Impression:       1. No radiographic evidence of acute cardiopulmonary process.     This report was finalized on 07/08/2017 09:31 by Dr. Adolph Echols MD.        Films reviewed personally by me.  My interpretation: Stable from previous    Pulmonary Assessment:  1. Respiratory failure related to agitation from ethanol withdrawal.  2. Malignant HTN   3. PRES   4. Recent problems with angioedema which have resolved  5. History of chronic ethanol use  6. C-diff  7. Tongue hematoma     Recommend:   · Continue current  mechanical ventilation.  Not candidate for weaning trials 2' to neuro status.   · Continues to require high doses of propofol and ativan  · Respiratory culture pending 2' green secretions  · Lumbar puncture today per nicole     Electronically signed by EPI Manzo on 7/8/2017 at 10:14 AM    Physician substantive contribution:  Pertinent symptoms/interval history include: His sedation has been weaned down yesterday but then sedation had to be completely interrupted temporarily due to lack of IV access and once IV access was reestablished he subsequently required higher levels of sedation.  Respiratory exam shows pertinent findings of clear fields on exam  Plan includes: Continue ventilatory support.  I have seen and examined patient personally, performing a face-to-face diagnostic evaluation with plan of care reviewed and developed with APRN and nursing staff. I have addended and/or modified the above history of present illness, physical examination, and assessment and plan to reflect my findings and impressions. Essential elements of the care plan were discussed with APRN above.  Agree with findings and assessment/plan as documented above.    Electronically signed by Indra Gallo MD, on 7/8/2017, 12:46 PM

## 2017-07-08 NOTE — PLAN OF CARE
Problem: Patient Care Overview (Adult)  Goal: Plan of Care Review  Outcome: Ongoing (interventions implemented as appropriate)    07/08/17 0538   Coping/Psychosocial Response Interventions   Plan Of Care Reviewed With patient   Patient Care Overview   Progress declining   Outcome Evaluation   Outcome Summary/Follow up Plan cardene at 15 mg/hr. pt more restless/agitated on vent tonight. left subclavian triple lumen central line placed. pt has a thrombus in the left arm. Pt is also having copious amounts of creamy yellow/green sputum from ETT. Tongue remains very swollen/black on the tip/malodorous.        Goal: Adult Individualization and Mutuality  Outcome: Ongoing (interventions implemented as appropriate)  Goal: Discharge Needs Assessment  Outcome: Ongoing (interventions implemented as appropriate)    Problem: Seizure Disorder/Epilepsy (Adult)  Goal: Signs and Symptoms of Listed Potential Problems Will be Absent or Manageable (Seizure Disorder/Epilepsy)  Outcome: Ongoing (interventions implemented as appropriate)    Problem: Fall Risk (Adult)  Goal: Absence of Falls  Outcome: Ongoing (interventions implemented as appropriate)    Problem: Pressure Ulcer Risk (Roman Scale) (Adult,Obstetrics,Pediatric)  Goal: Identify Related Risk Factors and Signs and Symptoms  Outcome: Ongoing (interventions implemented as appropriate)  Goal: Skin Integrity  Outcome: Ongoing (interventions implemented as appropriate)    Problem: SAFETY - NON-VIOLENT RESTRAINT  Goal: Remains free of injury from restraints (Non-Violent Restraint)  Outcome: Ongoing (interventions implemented as appropriate)  Goal: Free from restraint(s) (Non-Violent Restraint)  Outcome: Ongoing (interventions implemented as appropriate)    Problem: Nutrition, Enteral (Adult)  Goal: Signs and Symptoms of Listed Potential Problems Will be Absent or Manageable (Nutrition, Enteral)  Outcome: Ongoing (interventions implemented as appropriate)

## 2017-07-08 NOTE — PROGRESS NOTES
Neurology Progress Note      Chief Complaint:  PRES, heavy EtOH use, severe W/D    Subjective     Subjective:    Overnight, lost his IV lines, central line placed.  He was off Propofol for about an hour and was extremely agitated.  Reportedly opened his eyes but did not follow any commands.  Was moving all extremities at that time.  BPs in the 170s-180s/90s at that time but overall, BPs have been more stable.  DIarrhea.  C.Diff positive, on Flagyl.  Tongue swelling remains.  EEG showed no status.  Very slow throughout    Medications:  Current Facility-Administered Medications   Medication Dose Route Frequency Provider Last Rate Last Dose   • acetaminophen (TYLENOL) tablet 650 mg  650 mg Oral Q4H PRN Jairon Perez DO   650 mg at 07/03/17 2010   • albuterol (PROVENTIL) nebulizer solution 0.083% 2.5 mg/3mL  2.5 mg Nebulization Q6H - RT Jairon Perez DO   2.5 mg at 07/08/17 0643   • amLODIPine (NORVASC) tablet 10 mg  10 mg Per G Tube Q24H Cristino Encinas DO       • chlorhexidine (PERIDEX) 0.12 % solution 15 mL  15 mL Mouth/Throat Q12H Jairon Perez DO   15 mL at 07/07/17 2150   • dextromethorphan polistirex ER (DELSYM) 30 MG/5ML oral suspension 30 mg  30 mg Oral BID Robe Dorsey DO       • famotidine (PEPCID) injection 20 mg  20 mg Intravenous Q12H Jairon Perez DO   20 mg at 07/07/17 2150   • haloperidol lactate (HALDOL) injection 5 mg  5 mg Intravenous Q6H PRN Cristino Encinas DO   5 mg at 07/08/17 0420   • HYDROcodone-acetaminophen (NORCO)  MG per tablet 1 tablet  1 tablet Oral Q4H PRN Jairon Perez DO   1 tablet at 07/03/17 1758   • HYDROcodone-acetaminophen (NORCO) 5-325 MG per tablet 1 tablet  1 tablet Oral Q4H PRN Jairon Perez DO       • HYDROmorphone (DILAUDID) injection 1 mg  1 mg Intravenous Q3H PRN Jairon Perez DO   1 mg at 07/08/17 0306    And   • naloxone (NARCAN) injection 0.4 mg  0.4 mg Intravenous Q5 Min PRN Jairon Perez DO       • levETIRAcetam in NaCl 0.75%  (KEPPRA) IVPB 1,000 mg  1,000 mg Intravenous Q12H Royal Campos MD 0 mL/hr at 07/07/17 0850 1,000 mg at 07/07/17 2150   • LORazepam (ATIVAN) tablet 1 mg  1 mg Oral Q2H PRN Cristino Encinas, DO        Or   • LORazepam (ATIVAN) injection 1 mg  1 mg Intravenous Q2H PRN Cristino Encinas, DO        Or   • LORazepam (ATIVAN) tablet 2 mg  2 mg Oral Q1H PRN Cristino Encinas, DO        Or   • LORazepam (ATIVAN) injection 2 mg  2 mg Intravenous Q1H PRN Cristino Encinas, DO   2 mg at 07/06/17 2244    Or   • LORazepam (ATIVAN) injection 2 mg  2 mg Intravenous Q15 Min PRN Cristino Encinas, DO        Or   • LORazepam (ATIVAN) injection 2 mg  2 mg Intramuscular Q15 Min PRN Cristino Encinas, DO        Or   • LORazepam (ATIVAN) tablet 4 mg  4 mg Oral Q1H PRN Cristino Encinas, DO        Or   • LORazepam (ATIVAN) injection 4 mg  4 mg Intravenous Q1H PRN Cristino Encinas, DO   4 mg at 07/08/17 0248   • metoprolol tartrate (LOPRESSOR) tablet 50 mg  50 mg Nasogastric Q12H Jairon Perez, DO   50 mg at 07/07/17 2150   • metroNIDAZOLE (FLAGYL) IVPB 500 mg  500 mg Intravenous Q8H Robe Dorsey, DO   500 mg at 07/08/17 0209   • multiple vitamin (M.V.I. Adult) 10 mL, thiamine (B-1) 100 mg, folic acid 1 mg, magnesium sulfate 2 g in sodium chloride 0.9 % 1,000 mL infusion  100 mL/hr Intravenous Daily Cristino Encinas DO   Stopped at 07/07/17 1945   • niCARdipine (CARDENE) 25 mg/250 mL (0.1 mg/mL) 0.9% NS infusion  5-15 mg/hr Intravenous Titrated Jairon Perez  mL/hr at 07/08/17 0627 15 mg/hr at 07/08/17 0627   • ondansetron (ZOFRAN) injection 4 mg  4 mg Intravenous Q6H PRN Jairon C Perez, DO       • propofol (DIPRIVAN) infusion 10 mg/mL 100 mL  5-50 mcg/kg/min Intravenous Titrated Jairon Perez DO 37.3 mL/hr at 07/08/17 0730 75 mcg/kg/min at 07/08/17 0730   • sodium chloride 0.9 % flush 1-10 mL  1-10 mL Intravenous PRN Jairon Perez DO       • thiamine (B-1) 100 mg in sodium chloride 0.9 % 100 mL IVPB   100 mg Intravenous Daily Jairon Perez DO   Stopped at 07/07/17 0859       Review of Systems:   Could not obtain      Objective      Vital Signs  Temp:  [98.8 °F (37.1 °C)-99.8 °F (37.7 °C)] 98.8 °F (37.1 °C)  Heart Rate:  [] 93  Resp:  [18-21] 18  BP: (117-182)/() 160/87  FiO2 (%):  [30 %] 30 %    Physical Exam:  Intubated, sedated.  Tongue remains swollen  CTAB  RRR  Abdomen distended  2+ edema in the hands and feet    Neuro:  On max Propofol and Ativan Q 2 hours  Not awakening to loud voice or sternal rub  Eyes closed.  Pupils pinpoint, negative Doll's, negative gag  No w/d to painful stim  No spontaneous movment  DTRs absent  Equivocal Babinski     Results Review:    I reviewed the patient's new clinical results.      Results from last 7 days  Lab Units 07/06/17  0222 07/05/17  0158 07/03/17  0807   WBC 10*3/mm3 9.27 15.95* 11.95*   HEMOGLOBIN g/dL 12.1* 12.8* 11.9*   HEMATOCRIT % 35.4* 38.1* 34.1*   PLATELETS 10*3/mm3 216 246 245          Results from last 7 days  Lab Units 07/08/17  0411 07/07/17  0204 07/06/17  0222  07/03/17  0807   SODIUM mmol/L 143 142 142  < > 141   POTASSIUM mmol/L 3.5 3.2* 3.0*  < > 3.0*   CHLORIDE mmol/L 111* 111* 108  < > 102   CO2 mmol/L 21.0* 23.0* 27.0  < > 29.0   BUN mg/dL 11 14 15  < > 9   CREATININE mg/dL 0.98 1.05 0.98  < > 1.19   CALCIUM mg/dL 8.0* 8.0* 8.4  < > 8.8   BILIRUBIN mg/dL  --   --   --   --  0.6   ALK PHOS U/L  --   --   --   --  69   ALT (SGPT) U/L  --   --   --   --  39   AST (SGOT) U/L  --   --   --   --  72*   GLUCOSE mg/dL 131* 119* 87  < > 127*   < > = values in this interval not displayed.     Lab Results   Component Value Date    MG 2.1 07/06/2017     No components found for: POCGLUC  No components found for: A1C  Lab Results   Component Value Date    HDL 36 02/04/2016     No components found for: B12  No results found for: TSH    Assessment/Plan     Hospital Problem List    Active Problems:    Angio-edema    Seizures    Impression  52 year  old admitted with seizures, malignant HTN, PRES, EtOH w/d. Agitation, intubated, sedated, C.diff, low grade temps      Plan  1. PRES  --BPS are better.   Remains on Cardene but pressures are improved overall  --No overt seizure activity  --EEG showed no evidence of status.    --On Keppra to 1000mg IV BID      2. DTs  --On Ativan, Propofol  --Intubated  --On Thiamine, FA  --Wean as tolerated    3.  C.diff  --likely source of fevers  --On Flagyl  --Will attempt LP today, although my suspicion for CNS infection at this point is low.      4. Tongue swelling: Appears about the same.  Intubated currently     Neuro exam is extremely difficult given the high doses of Propofol and Ativan required to keep him sedated.  Will continue to follow closely.        Royal Campos MD  07/08/17  7:38 AM

## 2017-07-08 NOTE — PROGRESS NOTES
1135 left icu 7 for CT. ETCO2 28mmhg and satuation 100%. Bagged patient on 100% thru out transport and procedure.  ETCO2 remained 28mmhg-35mmhg from leaving icu 7 to procedure and back at room. O2 satuation remained 100-95% thru out transport, procedure and upon returning to room.  Pt tolerated transport and procedure well.    Louisa Gresham RRT

## 2017-07-08 NOTE — PROGRESS NOTES
[facility] Medicine Services  INPATIENT PROGRESS NOTE    Length of Stay: 4  Date of Admission: 7/3/2017  Primary Care Physician: Linda Hoffman, DNP, APRN    Subjective     Chief Complaint:     The patient is intubated and sedated    HPI     The patient remains febrile and he has developed a purulent oral discharge.  Patient's tongue edema continues to increase as well.   CBC for today remains pending.  A fecal management system has been initiated and his diarrhea is slowing.  ABGs look good.  The patient still becomes very agitated with Diprivan taper.  Metabolic panel is essentially normal.  I will obtain a CT scan of the head and neck assessing for infection or abscess.  Lumbar puncture is in progress per Dr. Campos.  We discussed the case together and will initiate empiric therapy for meningitis with Rocephin and vancomycin.  Both of those agents should also cover anaerobes for lingual or sublingual abscess.   ENT has signed off his case but I will ask them to return for reassessment and recommendations.  Peripheral IVs infiltrated last night and general surgery has inserted a central line.  LUE swelling and induration noted and venous ultrasound shows evidence of basilic vein thrombus.  DVT prophylaxis had been held on admission because of the sublingual hematoma and will be initiated today.      Review of Systems   Unable to obtain secondary to intubation and sedation  All pertinent negatives and positives are as above. All other systems have been reviewed and are negative unless otherwise stated.     Objective    Temp:  [98.8 °F (37.1 °C)-99.8 °F (37.7 °C)] 98.8 °F (37.1 °C)  Heart Rate:  [] 77  Resp:  [16-21] 16  BP: (117-182)/() 155/89  FiO2 (%):  [30 %] 30 %    Lab Results (last 24 hours)     Procedure Component Value Units Date/Time    Clostridium Difficile Toxin, PCR [[id number]]  (Abnormal) Collected:  07/06/17 5672    Specimen:  Stool from Per Rectum  Updated:  07/07/17 1348     C. Difficile Toxins by PCR Positive (A)    Blood Gas, Arterial [[id number]]  (Abnormal) Collected:  07/08/17 0214    Specimen:  Arterial Blood Updated:  07/08/17 0217     Site Arterial: right radial     Gera's Test --      Documented in Rapid Comm        pH, Arterial 7.422 pH units      pCO2, Arterial 35.8 mm Hg      pO2, Arterial 71.1 (L) mm Hg      HCO3, Arterial 22.8 mmol/L      Base Excess, Arterial -1.1 mmol/L      O2 Saturation, Arterial 94.7 %      O2 Saturation Calculated 94.7 %      Barometric Pressure for Blood Gas -- mmHg       Component not reported at this site.        Modality Ventilator     FIO2 30 %      Ventilator Mode Assist     Rate 10.0 Breaths/minute      PEEP 5.0     Vent CPAP/PEEP 5.0    Narrative:       Serial Number: [id number]    : [id number]    Respiratory Culture [[id number]] Collected:  07/08/17 0403    Specimen:  Sputum from NT Suction Updated:  07/08/17 0407    Basic Metabolic Panel [[id number]]  (Abnormal) Collected:  07/08/17 0411    Specimen:  Blood Updated:  07/08/17 0451     Glucose 131 (H) mg/dL      BUN 11 mg/dL      Creatinine 0.98 mg/dL      Sodium 143 mmol/L      Potassium 3.5 mmol/L      Chloride 111 (H) mmol/L      CO2 21.0 (L) mmol/L      Calcium 8.0 (L) mg/dL      eGFR Non African Amer 81 mL/min/1.73      BUN/Creatinine Ratio 11.2     Anion Gap 11.0 mmol/L     Narrative:       GFR Normal >60  Chronic Kidney Disease <60  Kidney Failure <15          Imaging Results (last 24 hours)     Procedure Component Value Units Date/Time    XR Chest 1 View [[id number]] Collected:  07/07/17 2137     Updated:  07/07/17 2153    Narrative:       EXAMINATION: XR CHEST 1 VW- 7/7/2017 9:31 PM CDT     HISTORY: Central line placement.     COMPARISON: 07/07/2017.     FINDINGS:  The left subclavian central line has been placed with tip in the  proximal SVC. No pneumothorax is identified. The endotracheal and  enteric tubes remain in place. Scattered areas of  subsegmental  atelectasis are present. The heart is magnified but felt to be mildly  enlarged. The pulmonary vasculature is stable.       Impression:       Interval placement of left subclavian central line without  evidence of pneumothorax.  This report was finalized on 07/07/2017 21:50 by Dr. Driss Ward MD.    US Venous Doppler Upper Extremity Left (duplex) [[id number]] Collected:  07/08/17 0719     Updated:  07/08/17 0723    Narrative:       EXAMINATION:   US VENOUS DOPPLER UPPER EXTREMITY LEFT (DUPLEX)-   7/8/2017 7:19 AM CDT     HISTORY: Venous Doppler analysis of the left upper extremity.     Irineo vein demonstrates compression and color flow.     Left subclavian vein demonstrates color flow and augmentation.     Axillary vein demonstrates compression color flow and augmentation.     The brachial vein demonstrates color flow and compression. The left  basilic vein demonstrates lack of compression above the elbow. This lack  of compression is consistent with thrombus in the basilic vein. The  cephalic vein demonstrates color flow compression. The radial vein  demonstrates color flow compression. The ulnar vein demonstrates color  flow compression.       Impression:       Thrombus is visualized in the left basilic vein above the  elbow.  This report was finalized on 07/08/2017 07:20 by Dr. Adolph Echols MD.    US Arterial Doppler Upper Extremity Left [[id number]] Collected:  07/08/17 0720     Updated:  07/08/17 0725    Narrative:       EXAMINATION:   US ARTERIAL DOPPLER UPPER EXTREMITY  LEFT-  7/8/2017 7:20  AM CDT     HISTORY: Left upper extremity duplex arterial analysis     On this examination the left subclavian artery demonstrates color flow  and peak systolic velocity 45 cm/s. Axillary artery demonstrates color  flow with a peak systolic velocity 104 cm/s. The brachial artery  demonstrates peak systolic velocity 130 cm/s with triphasic waveform.  The radial artery demonstrates color flow and triphasic  waveform peak  systolic velocity 78 cm/s. The ulnar artery demonstrates triphasic  waveform color flow and peak systolic velocity 67 cm/s.       Impression:       Negative left upper extremity arterial duplex ultrasound  This report was finalized on 07/08/2017 07:22 by Dr. Adolph Echols MD.    XR Chest 1 View [[id number]] Collected:  07/08/17 0846     Updated:  07/08/17 0935    Narrative:       EXAMINATION:   XR CHEST 1 VW-  7/8/2017 8:45 AM CDT     HISTORY: Frontal supine radiograph of the chest 7/8/2017 3:22 AM CDT     COMPARISON: 07/07/2017.     FINDINGS:   The lungs are clear. The cardiomediastinal silhouette and pulmonary  vascularity are within normal limits. Endotracheal tube is  satisfactorily positioned. Left subclavian catheter is unchanged.     The osseous structures and surrounding soft tissues demonstrate no acute  abnormality.       Impression:       1. No radiographic evidence of acute cardiopulmonary process.     This report was finalized on 07/08/2017 09:31 by Dr. Adolph Echols MD.             Intake/Output Summary (Last 24 hours) at 07/08/17 0957  Last data filed at 07/08/17 0400   Gross per 24 hour   Intake           3240.8 ml   Output             1550 ml   Net           1690.8 ml       Physical Exam    Constitutional: No distress.   Seen and discussed with his nurse, Bao. No family present. He responds to Ativan plus Diprivan sedation reasonably well. ETT secure, Central line present.    HENT:   Head: Normocephalic and atraumatic.   Eyes: Pupils are equal, round, and reactive to light.  The patient's tongue continues to swell and there is malodoruos purulent drainage noted from the oral cavity.  Neck: Neck supple. No JVD present.   Cardiovascular: Normal rate and regular rhythm.   Pulmonary/Chest: Effort normal and breath sounds normal.   Ventilated.    Abdominal: Soft. Bowel sounds are normal.   Musculoskeletal: He exhibits edema (trace).   Neurological:   Currently he is sedated with propofol  and Ativan. Dr. Campos and I discussed his case. He continues to become very agitated when sedation is tapered and is still not appropriate for extubation because of his lingual swelling.  Dr. Campos is attempting a lumbar puncture at present and requested to initiate treatment empirically for possible meningitis.  Skin: Skin is warm and dry.       Results Review:  I have reviewed the labs, radiology results, and diagnostic studies since my last progress note and made treatment changes reflective of the results.   I have reviewed the current medications.    Assessment/Plan     Hospital Problem List     Angio-edema    Seizures           1. Possible angioedema related to lisinopril.  2. Malignant hypertension.  3. Possible new onset seizure disorder versus convulsive syncope.  4. Posterior reversible encephalopathy syndrome on MRI.  5. Tongue bite with sublingual hematoma.  6. Tobacco abuse.  7. Obstructive sleep apnea, noncompliant with device.  8. Gouty arthritis.  9. Daily alcohol use with Delirium tremens requiring sedation and mechanical ventilation.  10. Hypokalemia.   11. C. Difficile colitilis  12. Lingual or sublingual infection or abcess      PLAN:    Rocephin 2gm IV q 12h  Vancomycin 1 g every 12 hours with pharmacy to dose thereafter  CT scan head and neck with and without contrast  Reconsult ENT  VTE prophylaxis with Lovenox    Cristino Encinas DO   07/08/17   9:57 AM     Approximately 40 minutes of critical care time were spent managing the patient exclusive of billable procedures.

## 2017-07-08 NOTE — CONSULTS
Minna Tran MD FACS Consult Note    Referring Provider: Nishant Barksdale MD    Patient Care Team:  Linda Hoffman DNP, APRN as PCP - General  Linda Hoffman DNP, APRN as PCP - Family Medicine    Chief complaint request for central line    Subjective .     History of present illness:  The patient is a 52-year-old male who is sedated on the vent and has lost venous access.  Surgical consultation has been requested for central line placement.    Review of Systems  All systems were reviewed and negative for    Constitution:chills, fevers, night sweats and weight loss, weight gain  Eyes:  double vision, blurriness and loss of vision  ENT:  earaches, hearing loss and hoarseness  Respiratory:  cough, dry, cough, productive, hemoptysis and shortness of air  Cardiovascular:  chest pressure / pain, at rest, chest pressure / pain, on exertion, irregular pulse and palpitations  Gastrointestinal: bright red blood per rectum, change in bowel habits, constipation, diarrhea, heartburn, hematemesis, melena, nausea, pain and vomiting  Genitourinary:  difficulty / inability to void, pain, blood in urine and painful urination  Integument:  itching, rash, redness and swelling  Breast:  lump / mass, nipple discharge and pain  Hematologic / Lymphatic: easy bruising and lymphadenopathy  Musculoskeletal: joint pain, muscle pain and muscle weakness  Neurological: dizziness, loss of consciousness, numbness, vertigo and weakness  Behavioral/Psych: anxiety and depression  Endocrine: diabetes, thyroid disorder      History  Past Medical History:   Diagnosis Date   • Anxiety    • Arthritis    • Gout    • HTN (hypertension)    • Hyperlipidemia    , History reviewed. No pertinent surgical history.,   Family History   Problem Relation Age of Onset   • Hypertension Mother    • Diabetes Mother    • Heart disease Father    • Hypertension Father    • No Known Problems Daughter    • No Known Problems Son    • Diabetes Maternal Grandmother     • No Known Problems Maternal Grandfather    • No Known Problems Paternal Grandmother    • Heart attack Paternal Grandfather    • Kidney disease Sister    ,   Social History   Substance Use Topics   • Smoking status: Current Every Day Smoker     Packs/day: 0.33     Years: 30.00     Types: Cigarettes   • Smokeless tobacco: None   • Alcohol use 1.2 oz/week     2 Cans of beer per week      Comment: PER DAY FOR 30 YEARS   ,   Prescriptions Prior to Admission   Medication Sig Dispense Refill Last Dose   • cloNIDine (CATAPRES) 0.1 MG tablet Take 0.1 mg by mouth every night.   Past Month at Unknown time   • fenofibrate (TRICOR) 145 MG tablet Take 1 tablet by mouth Every Night. 90 tablet 1 Past Month at Unknown time   • ibuprofen (ADVIL,MOTRIN) 800 MG tablet Take 800 mg by mouth Every 6 (Six) Hours As Needed for Moderate Pain (4-6).   Past Month at Unknown time   • indomethacin (INDOCIN) 50 MG capsule Take 1 capsule by mouth 3 (Three) Times a Day As Needed for mild pain (1-3). (Patient taking differently: Take 50 mg by mouth 3 (Three) Times a Day As Needed for Mild Pain (1-3) (gout).) 60 capsule 5 7/3/2017 at 0600   • lisinopril-hydrochlorothiazide (PRINZIDE,ZESTORETIC) 20-25 MG per tablet Take 1 tablet by mouth Daily. 90 tablet 11 7/3/2017 at 0600   • metoprolol tartrate (LOPRESSOR) 50 MG tablet Take 50 mg by mouth Every 12 (Twelve) Hours.   Past Week at Unknown time   • vardenafil (LEVITRA) 10 MG tablet Take 1 tablet by mouth Daily As Needed for erectile dysfunction. 10 tablet 5 More than a month at Unknown time    and Allergies:  Lipitor [atorvastatin]    Current Facility-Administered Medications:   •  acetaminophen (TYLENOL) tablet 650 mg, 650 mg, Oral, Q4H PRN, Jairon Perez DO, 650 mg at 07/03/17 2010  •  albuterol (PROVENTIL) nebulizer solution 0.083% 2.5 mg/3mL, 2.5 mg, Nebulization, Q6H - RT, Jairon Perez DO, 2.5 mg at 07/07/17 4095  •  [START ON 7/8/2017] amLODIPine (NORVASC) tablet 10 mg, 10 mg, Per G  Tube, Q24H, Cristino Encinas DO  •  chlorhexidine (PERIDEX) 0.12 % solution 15 mL, 15 mL, Mouth/Throat, Q12H, Jairon Perez DO, 15 mL at 07/07/17 0819  •  famotidine (PEPCID) injection 20 mg, 20 mg, Intravenous, Q12H, Jairon Perez DO, 20 mg at 07/07/17 0819  •  haloperidol lactate (HALDOL) injection 5 mg, 5 mg, Intravenous, Q6H PRN, Cristino Encinas DO, 5 mg at 07/07/17 1840  •  HYDROcodone-acetaminophen (NORCO)  MG per tablet 1 tablet, 1 tablet, Oral, Q4H PRN, Jairon Perez DO, 1 tablet at 07/03/17 1758  •  HYDROcodone-acetaminophen (NORCO) 5-325 MG per tablet 1 tablet, 1 tablet, Oral, Q4H PRN, Jairon Perez DO  •  HYDROmorphone (DILAUDID) injection 1 mg, 1 mg, Intravenous, Q3H PRN, 1 mg at 07/07/17 0902 **AND** naloxone (NARCAN) injection 0.4 mg, 0.4 mg, Intravenous, Q5 Min PRN, Jairon Perez DO  •  levETIRAcetam in NaCl 0.75% (KEPPRA) IVPB 1,000 mg, 1,000 mg, Intravenous, Q12H, Royal Campos MD, Stopped at 07/07/17 0850  •  LORazepam (ATIVAN) tablet 1 mg, 1 mg, Oral, Q2H PRN **OR** LORazepam (ATIVAN) injection 1 mg, 1 mg, Intravenous, Q2H PRN **OR** LORazepam (ATIVAN) tablet 2 mg, 2 mg, Oral, Q1H PRN **OR** LORazepam (ATIVAN) injection 2 mg, 2 mg, Intravenous, Q1H PRN, 2 mg at 07/06/17 2244 **OR** LORazepam (ATIVAN) injection 2 mg, 2 mg, Intravenous, Q15 Min PRN **OR** LORazepam (ATIVAN) injection 2 mg, 2 mg, Intramuscular, Q15 Min PRN **OR** LORazepam (ATIVAN) tablet 4 mg, 4 mg, Oral, Q1H PRN **OR** LORazepam (ATIVAN) injection 4 mg, 4 mg, Intravenous, Q1H PRN, Cristino Encinas, DO, 4 mg at 07/07/17 2039  •  metoprolol tartrate (LOPRESSOR) tablet 50 mg, 50 mg, Nasogastric, Q12H, Jairon Perez, DO, 50 mg at 07/07/17 0819  •  metroNIDAZOLE (FLAGYL) IVPB 500 mg, 500 mg, Intravenous, Q8H, Robe Dorsey, DO, 500 mg at 07/07/17 1722  •  multiple vitamin (M.V.I. Adult) 10 mL, thiamine (B-1) 100 mg, folic acid 1 mg, magnesium sulfate 2 g in sodium chloride 0.9 % 1,000 mL infusion, 100  mL/hr, Intravenous, Daily, Cristino Encinas DO, Last Rate: 100 mL/hr at 07/07/17 0819, 100 mL/hr at 07/07/17 0819  •  niCARdipine (CARDENE) 25 mg/250 mL (0.1 mg/mL) 0.9% NS infusion, 5-15 mg/hr, Intravenous, Titrated, Jairon Perez DO, Last Rate: 50 mL/hr at 07/07/17 1843, 5 mg/hr at 07/07/17 1843  •  ondansetron (ZOFRAN) injection 4 mg, 4 mg, Intravenous, Q6H PRN, Jairon Perez DO  •  propofol (DIPRIVAN) infusion 10 mg/mL 100 mL, 5-50 mcg/kg/min, Intravenous, Titrated, Jairon Perez DO, Last Rate: 24.8 mL/hr at 07/07/17 1758, 50 mcg/kg/min at 07/07/17 1758  •  sodium chloride 0.9 % flush 1-10 mL, 1-10 mL, Intravenous, PRN, Jairon Perez DO  •  thiamine (B-1) 100 mg in sodium chloride 0.9 % 100 mL IVPB, 100 mg, Intravenous, Daily, Jairon Perez DO, Stopped at 07/07/17 0859    Objective     Vital Signs   Temp:  [97.5 °F (36.4 °C)-100.6 °F (38.1 °C)] 100.6 °F (38.1 °C)  Heart Rate:  [] 109  Resp:  [11-21] 21  BP: (111-171)/(69-92) 171/88  FiO2 (%):  [30 %] 30 %    Physical Exam:  General appearance - sedated, on vent  Mental status - sedated  Neck - supple, no significant adenopathy  Chest - clear to auscultation, no wheezes, rales or rhonchi, symmetric air entry  Heart - normal rate, regular rhythm, normal S1, S2, no murmurs, rubs, clicks or gallops  Abdomen - soft, nontender, nondistended, no masses or organomegaly  Neurological - alert, oriented, normal speech, no focal findings or movement disorder noted  Musculoskeletal - no joint tenderness, deformity or swelling  Extremities - peripheral pulses normal, no pedal edema, no clubbing or cyanosis    Results Review:    Lab Results (last 24 hours)     Procedure Component Value Units Date/Time    Gastrointestinal Panel, PCR [[id number]]  (Abnormal) Collected:  07/06/17 9691    Specimen:  Stool from Per Rectum Updated:  07/07/17 0124     Campylobacter Not Detected     Clostridium difficile (toxin A/B) Detected (A)        Due to the high asymptomatic  carriage rates, especially in young children, the clinical relevance of the detection of toxigenic C. difficile from stool should be considered in the context of other clinical findings, patient age, and risk factors including hospitalization and antibiotic exposure.        Plesiomonas shigelloides Not Detected     Salmonella Not Detected     Vibrio Not Detected     Vibrio cholerae Not Detected     Yersinia enterocolitica Not Detected     Enteroaggregative E. coli (EAEC) Not Detected     Enteropathogenic E. coli (EPEC) Not Detected     Enterotoxigenic E. coli (ETEC) lt/st Not Detected     Shiga-like toxin-producing E. coli (STEC) stx1/stx2 Not Detected     E. coli O157 Not Detected     Shigella/Enteroinvasive E. coli (EIEC) Not Detected     Cryptosporidium Not Detected     Cyclospora cayetanensis Not Detected     Entamoeba histolytica Not Detected     Giardia lamblia Not Detected     Adenovirus F40/41 Not Detected     Astrovirus Not Detected     Norovirus GI/GII Not Detected     Rotavirus A Not Detected     Sapovirus (I, II, IV or V) Not Detected    Basic Metabolic Panel [[id number]]  (Abnormal) Collected:  07/07/17 0204    Specimen:  Blood Updated:  07/07/17 0317     Glucose 119 (H) mg/dL      BUN 14 mg/dL      Creatinine 1.05 mg/dL      Sodium 142 mmol/L      Potassium 3.2 (L) mmol/L      Chloride 111 (H) mmol/L      CO2 23.0 (L) mmol/L      Calcium 8.0 (L) mg/dL      eGFR Non African Amer 74 mL/min/1.73      BUN/Creatinine Ratio 13.3     Anion Gap 8.0 mmol/L     Narrative:       GFR Normal >60  Chronic Kidney Disease <60  Kidney Failure <15    Blood Gas, Arterial [[id number]]  (Abnormal) Collected:  07/07/17 0339    Specimen:  Arterial Blood Updated:  07/07/17 0343     Site Arterial: right radial     Gera's Test --      Documented in Rapid Comm        pH, Arterial 7.434 pH units      pCO2, Arterial 31.5 (L) mm Hg      pO2, Arterial 76.4 (L) mm Hg      HCO3, Arterial 20.6 (L) mmol/L      Base Excess, Arterial -2.5  (L) mmol/L      O2 Saturation, Arterial 95.8 %      O2 Saturation Calculated 95.8 %      Barometric Pressure for Blood Gas -- mmHg       Component not reported at this site.        Modality Ventilator     FIO2 30 %      Ventilator Mode AC     Rate 10.0 Breaths/minute      PEEP 5.0     Vent CPAP/PEEP 5.0    Narrative:       Serial Number: [id number]    : [id number]    Clostridium Difficile Toxin, PCR [[id number]]  (Abnormal) Collected:  07/06/17 1844    Specimen:  Stool from Per Rectum Updated:  07/07/17 1348     C. Difficile Toxins by PCR Positive (A)        Imaging Results (last 24 hours)     Procedure Component Value Units Date/Time    XR Chest 1 View [[id number]] Collected:  07/07/17 0708     Updated:  07/07/17 0712    Narrative:       EXAMINATION:   XR CHEST 1 VW-  7/7/2017 7:08 AM CDT     HISTORY: Intubated patient     Single view the chest obtained. The lungs are clear. Cardiac silhouettes  mildly enlarged. Endotracheal tube nasogastric tube are satisfactorily  position.     This compared prior study 07/06/2017.     Obdulia and stable single view chest  This report was finalized on 07/07/2017 07:09 by Dr. Adolph Echols MD.                Assessment/Plan       Need for venous access.  A central line will be placed.  Consent was received from daughter.      Minna Tran MD  07/07/17  9:13 PM

## 2017-07-08 NOTE — PLAN OF CARE
Problem: Patient Care Overview (Adult)  Goal: Plan of Care Review  Outcome: Ongoing (interventions implemented as appropriate)    07/08/17 0538   Coping/Psychosocial Response Interventions   Plan Of Care Reviewed With patient   Patient Care Overview   Progress declining   Outcome Evaluation   Outcome Summary/Follow up Plan cardene at 15 mg/hr. pt more restless/agitated on vent tonight. IV's in Left arm infiltrated left subclavian triple lumen central line placed. pt has a thrombus in the left arm. Pt is also having copious amounts of creamy yellow/green sputum from ETT        Goal: Adult Individualization and Mutuality  Outcome: Ongoing (interventions implemented as appropriate)  Goal: Discharge Needs Assessment  Outcome: Ongoing (interventions implemented as appropriate)    Problem: Seizure Disorder/Epilepsy (Adult)  Goal: Signs and Symptoms of Listed Potential Problems Will be Absent or Manageable (Seizure Disorder/Epilepsy)  Outcome: Ongoing (interventions implemented as appropriate)    Problem: Fall Risk (Adult)  Goal: Absence of Falls  Outcome: Ongoing (interventions implemented as appropriate)    Problem: Pressure Ulcer Risk (Roman Scale) (Adult,Obstetrics,Pediatric)  Goal: Identify Related Risk Factors and Signs and Symptoms  Outcome: Ongoing (interventions implemented as appropriate)  Goal: Skin Integrity  Outcome: Ongoing (interventions implemented as appropriate)    Problem: SAFETY - NON-VIOLENT RESTRAINT  Goal: Remains free of injury from restraints (Non-Violent Restraint)  Outcome: Ongoing (interventions implemented as appropriate)  Goal: Free from restraint(s) (Non-Violent Restraint)  Outcome: Ongoing (interventions implemented as appropriate)    Problem: Nutrition, Enteral (Adult)  Goal: Signs and Symptoms of Listed Potential Problems Will be Absent or Manageable (Nutrition, Enteral)  Outcome: Ongoing (interventions implemented as appropriate)

## 2017-07-08 NOTE — PROCEDURES
Procedure:  Lumbar Puncture    Indication:  AMS    Consent:  Risks and benefits explained to the family including bleeding, pain, infection, nerve damage.  Consent signed    Time Out:  Completed with nurse    Procedure:    Sterile technique was observed throughout.  The patient was placed in the right lateral position.  L3-4 interspace was palpated.  Area was prepped with Betadine solution.  5 cc of 1% Lidocaine without epi was used for anesthesia.  LP needle was introduced in 2 passes with no return of spinal fluid.  Procedure was terminated.  No blood loss.  Good hemostasis.  Area was cleaned and pressure bandaged applied    While the patient is on Lovenox, I felt that the risks outweighed the benefits given his ongoing AMS, changes on MRI and fevers.  After 2 passes I terminated the procedure to minimize the risk of bleeding and  Spoke with the PCP regarding empiric CNS coverage.  We will add Vanc and Rocephin at CNS doses for now.

## 2017-07-08 NOTE — OP NOTE
Preoperative diagnosis:  Need for central access  Postoperative diagnosis:  Same  Procedure:  Central line placement  Surgeon:  Minna Tran MD  Anesthesia:  1% lidocaine  Ivf:  None  Indications:  The patient is a 52-year-old male who is sedated on the vent and without venous access.    Description of procedure:  The patient was laid in trendelenburg position.  The left chest was prepped and draped.  1% lidocaine was used for local anesthesia.  Access needle was used to aspirate venous blood from the left subclavian vein.  A 0.035mm J wire was placed into the vein through the needle.  The needle was removed.  The skin was enlarged at the needle site.  The tract was dilated over the wire.  A triple lumen catheter was placed into the vein over the wire.  Venous access was aspirated from each line and each line was flushed with sterile saline.  The catheter was anchored with 3-0 silk x 2.  Dressings were applied.    Complications:  None  Disposition:  The patient tolerated the procedure well.  We will check a CXR for placement.

## 2017-07-09 ENCOUNTER — APPOINTMENT (OUTPATIENT)
Dept: GENERAL RADIOLOGY | Facility: HOSPITAL | Age: 52
End: 2017-07-09

## 2017-07-09 LAB
ANION GAP SERPL CALCULATED.3IONS-SCNC: 10 MMOL/L (ref 4–13)
ARTERIAL PATENCY WRIST A: NORMAL
ATMOSPHERIC PRESS: NORMAL MMHG
BASE EXCESS BLDA CALC-SCNC: -1.1 MMOL/L (ref -2–2)
BDY SITE: NORMAL
BILIRUB UR QL STRIP: NEGATIVE
BUN BLD-MCNC: 11 MG/DL (ref 5–21)
BUN/CREAT SERPL: 12.1 (ref 7–25)
CALCIUM SPEC-SCNC: 8 MG/DL (ref 8.4–10.4)
CHLORIDE SERPL-SCNC: 111 MMOL/L (ref 98–110)
CLARITY UR: CLEAR
CO2 SERPL-SCNC: 22 MMOL/L (ref 24–31)
COLOR UR: ABNORMAL
CREAT BLD-MCNC: 0.91 MG/DL (ref 0.5–1.4)
GFR SERPL CREATININE-BSD FRML MDRD: 88 ML/MIN/1.73
GLUCOSE BLD-MCNC: 108 MG/DL (ref 70–100)
GLUCOSE UR STRIP-MCNC: NEGATIVE MG/DL
HCO3 BLDA-SCNC: 22.9 MMOL/L (ref 22–26)
HGB UR QL STRIP.AUTO: NEGATIVE
HOROWITZ INDEX BLD+IHG-RTO: 30 %
KETONES UR QL STRIP: NEGATIVE
LEUKOCYTE ESTERASE UR QL STRIP.AUTO: NEGATIVE
MODALITY: NORMAL
NITRITE UR QL STRIP: NEGATIVE
PCO2 BLDA: 36.3 MM HG (ref 35–45)
PEEP RESPIRATORY: 5 CM[H2O]
PH BLDA: 7.42 PH UNITS (ref 7.35–7.45)
PH UR STRIP.AUTO: 5.5 [PH] (ref 5–8)
PO2 BLDA: 80.8 MM HG (ref 80–100)
POTASSIUM BLD-SCNC: 3.7 MMOL/L (ref 3.5–5.3)
PROT UR QL STRIP: NEGATIVE
SAO2 % BLDCOA: 96.2 % (ref 94–100)
SAO2 % BLDCOA: 96.2 % (ref 94–100)
SODIUM BLD-SCNC: 143 MMOL/L (ref 135–145)
SP GR UR STRIP: 1.02 (ref 1–1.03)
TOTAL RATE: 10 BREATHS/MINUTE
UROBILINOGEN UR QL STRIP: ABNORMAL
VENT CPAP/PEEP: 5
VENTILATOR MODE: NORMAL

## 2017-07-09 PROCEDURE — 25010000002 HALOPERIDOL LACTATE PER 5 MG: Performed by: FAMILY MEDICINE

## 2017-07-09 PROCEDURE — 82803 BLOOD GASES ANY COMBINATION: CPT

## 2017-07-09 PROCEDURE — 25010000002 PROPOFOL 1000 MG/ML EMULSION: Performed by: INTERNAL MEDICINE

## 2017-07-09 PROCEDURE — 25010000003 DEXAMETHASONE SODIUM PHOSPHATE 100 MG/10ML SOLUTION 10 ML VIAL: Performed by: FAMILY MEDICINE

## 2017-07-09 PROCEDURE — 81003 URINALYSIS AUTO W/O SCOPE: CPT | Performed by: FAMILY MEDICINE

## 2017-07-09 PROCEDURE — 25010000002 THIAMINE PER 100 MG: Performed by: INTERNAL MEDICINE

## 2017-07-09 PROCEDURE — 25010000002 ONDANSETRON PER 1 MG: Performed by: INTERNAL MEDICINE

## 2017-07-09 PROCEDURE — 94760 N-INVAS EAR/PLS OXIMETRY 1: CPT

## 2017-07-09 PROCEDURE — 71010 HC CHEST PA OR AP: CPT

## 2017-07-09 PROCEDURE — 36600 WITHDRAWAL OF ARTERIAL BLOOD: CPT

## 2017-07-09 PROCEDURE — 25010000003 LEVETIRACETAM IN NACL 0.75% 1000 MG/100ML SOLUTION: Performed by: PSYCHIATRY & NEUROLOGY

## 2017-07-09 PROCEDURE — 94770: CPT

## 2017-07-09 PROCEDURE — 99232 SBSQ HOSP IP/OBS MODERATE 35: CPT | Performed by: PSYCHIATRY & NEUROLOGY

## 2017-07-09 PROCEDURE — 94799 UNLISTED PULMONARY SVC/PX: CPT

## 2017-07-09 PROCEDURE — 25010000002 VANCOMYCIN 10 G RECONSTITUTED SOLUTION: Performed by: FAMILY MEDICINE

## 2017-07-09 PROCEDURE — 80048 BASIC METABOLIC PNL TOTAL CA: CPT | Performed by: FAMILY MEDICINE

## 2017-07-09 PROCEDURE — 94003 VENT MGMT INPAT SUBQ DAY: CPT

## 2017-07-09 PROCEDURE — 25010000002 LORAZEPAM PER 2 MG: Performed by: FAMILY MEDICINE

## 2017-07-09 PROCEDURE — 25010000002 HYDROMORPHONE PER 4 MG: Performed by: INTERNAL MEDICINE

## 2017-07-09 PROCEDURE — 25010000002 ENOXAPARIN PER 10 MG: Performed by: FAMILY MEDICINE

## 2017-07-09 PROCEDURE — 25010000002 CEFTRIAXONE: Performed by: FAMILY MEDICINE

## 2017-07-09 PROCEDURE — 99232 SBSQ HOSP IP/OBS MODERATE 35: CPT | Performed by: NURSE PRACTITIONER

## 2017-07-09 RX ORDER — CHLORHEXIDINE GLUCONATE 0.12 MG/ML
15 RINSE ORAL EVERY 12 HOURS SCHEDULED
Status: DISCONTINUED | OUTPATIENT
Start: 2017-07-09 | End: 2017-07-16

## 2017-07-09 RX ADMIN — FAMOTIDINE 20 MG: 10 INJECTION INTRAVENOUS at 08:53

## 2017-07-09 RX ADMIN — CEFTRIAXONE 2 G: 2 INJECTION, POWDER, FOR SOLUTION INTRAMUSCULAR; INTRAVENOUS at 19:27

## 2017-07-09 RX ADMIN — LORAZEPAM 4 MG: 2 INJECTION INTRAMUSCULAR at 13:03

## 2017-07-09 RX ADMIN — VANCOMYCIN HYDROCHLORIDE 1250 MG: 10 INJECTION, POWDER, LYOPHILIZED, FOR SOLUTION INTRAVENOUS at 08:54

## 2017-07-09 RX ADMIN — ALBUTEROL SULFATE 2.5 MG: 2.5 SOLUTION RESPIRATORY (INHALATION) at 00:03

## 2017-07-09 RX ADMIN — PROPOFOL 70 MCG/KG/MIN: 10 INJECTION, EMULSION INTRAVENOUS at 18:15

## 2017-07-09 RX ADMIN — ENOXAPARIN SODIUM 40 MG: 40 INJECTION SUBCUTANEOUS at 08:55

## 2017-07-09 RX ADMIN — HYDROMORPHONE HYDROCHLORIDE 1 MG: 1 INJECTION, SOLUTION INTRAMUSCULAR; INTRAVENOUS; SUBCUTANEOUS at 11:44

## 2017-07-09 RX ADMIN — NICARDIPINE HYDROCHLORIDE 5 MG/HR: 25 INJECTION INTRAVENOUS at 16:54

## 2017-07-09 RX ADMIN — PROPOFOL 70 MCG/KG/MIN: 10 INJECTION, EMULSION INTRAVENOUS at 23:06

## 2017-07-09 RX ADMIN — DEXAMETHASONE SODIUM PHOSPHATE 6 MG: 10 INJECTION, SOLUTION INTRAMUSCULAR; INTRAVENOUS at 18:14

## 2017-07-09 RX ADMIN — DEXAMETHASONE SODIUM PHOSPHATE 6 MG: 10 INJECTION, SOLUTION INTRAMUSCULAR; INTRAVENOUS at 11:57

## 2017-07-09 RX ADMIN — CEFTRIAXONE 2 G: 2 INJECTION, POWDER, FOR SOLUTION INTRAMUSCULAR; INTRAVENOUS at 07:44

## 2017-07-09 RX ADMIN — CHLORHEXIDINE GLUCONATE 15 ML: 1.2 RINSE ORAL at 08:53

## 2017-07-09 RX ADMIN — CHLORHEXIDINE GLUCONATE 15 ML: 1.2 RINSE ORAL at 21:05

## 2017-07-09 RX ADMIN — LEVETIRACETAM 1000 MG: 1000 INJECTION, SOLUTION INTRAVENOUS at 21:05

## 2017-07-09 RX ADMIN — PROPOFOL 70 MCG/KG/MIN: 10 INJECTION, EMULSION INTRAVENOUS at 11:55

## 2017-07-09 RX ADMIN — METRONIDAZOLE 500 MG: 500 INJECTION, SOLUTION INTRAVENOUS at 17:44

## 2017-07-09 RX ADMIN — PROPOFOL 70 MCG/KG/MIN: 10 INJECTION, EMULSION INTRAVENOUS at 08:55

## 2017-07-09 RX ADMIN — NICARDIPINE HYDROCHLORIDE 5 MG/HR: 25 INJECTION INTRAVENOUS at 10:49

## 2017-07-09 RX ADMIN — METOPROLOL TARTRATE 50 MG: 50 TABLET ORAL at 21:06

## 2017-07-09 RX ADMIN — AMLODIPINE BESYLATE 10 MG: 10 TABLET ORAL at 08:54

## 2017-07-09 RX ADMIN — METRONIDAZOLE 500 MG: 500 INJECTION, SOLUTION INTRAVENOUS at 02:25

## 2017-07-09 RX ADMIN — IBUPROFEN 200 MG: 100 SUSPENSION ORAL at 13:42

## 2017-07-09 RX ADMIN — METOPROLOL TARTRATE 50 MG: 50 TABLET ORAL at 08:53

## 2017-07-09 RX ADMIN — HYDROMORPHONE HYDROCHLORIDE 1 MG: 1 INJECTION, SOLUTION INTRAMUSCULAR; INTRAVENOUS; SUBCUTANEOUS at 03:21

## 2017-07-09 RX ADMIN — ALBUTEROL SULFATE 2.5 MG: 2.5 SOLUTION RESPIRATORY (INHALATION) at 06:13

## 2017-07-09 RX ADMIN — LEVETIRACETAM 1000 MG: 1000 INJECTION, SOLUTION INTRAVENOUS at 08:54

## 2017-07-09 RX ADMIN — HALOPERIDOL LACTATE 5 MG: 5 INJECTION, SOLUTION INTRAMUSCULAR at 07:27

## 2017-07-09 RX ADMIN — ACETAMINOPHEN 650 MG: 325 TABLET, FILM COATED ORAL at 10:03

## 2017-07-09 RX ADMIN — PROPOFOL 70 MCG/KG/MIN: 10 INJECTION, EMULSION INTRAVENOUS at 03:35

## 2017-07-09 RX ADMIN — PROPOFOL 70 MCG/KG/MIN: 10 INJECTION, EMULSION INTRAVENOUS at 14:56

## 2017-07-09 RX ADMIN — PROPOFOL 70 MCG/KG/MIN: 10 INJECTION, EMULSION INTRAVENOUS at 21:06

## 2017-07-09 RX ADMIN — FAMOTIDINE 20 MG: 10 INJECTION INTRAVENOUS at 21:06

## 2017-07-09 RX ADMIN — ONDANSETRON 4 MG: 2 INJECTION INTRAMUSCULAR; INTRAVENOUS at 17:15

## 2017-07-09 RX ADMIN — PROPOFOL 70 MCG/KG/MIN: 10 INJECTION, EMULSION INTRAVENOUS at 06:25

## 2017-07-09 RX ADMIN — HALOPERIDOL LACTATE 5 MG: 5 INJECTION, SOLUTION INTRAMUSCULAR at 19:09

## 2017-07-09 RX ADMIN — DEXTROMETHORPHAN 30 MG: 30 SUSPENSION, EXTENDED RELEASE ORAL at 09:09

## 2017-07-09 RX ADMIN — LORAZEPAM 2 MG: 2 INJECTION INTRAMUSCULAR; INTRAVENOUS at 03:36

## 2017-07-09 RX ADMIN — DEXAMETHASONE SODIUM PHOSPHATE 6 MG: 10 INJECTION, SOLUTION INTRAMUSCULAR; INTRAVENOUS at 23:58

## 2017-07-09 RX ADMIN — ALBUTEROL SULFATE 2.5 MG: 2.5 SOLUTION RESPIRATORY (INHALATION) at 23:56

## 2017-07-09 RX ADMIN — NICARDIPINE HYDROCHLORIDE 7.5 MG/HR: 25 INJECTION INTRAVENOUS at 21:06

## 2017-07-09 RX ADMIN — DEXTROMETHORPHAN 30 MG: 30 SUSPENSION, EXTENDED RELEASE ORAL at 17:45

## 2017-07-09 RX ADMIN — VANCOMYCIN HYDROCHLORIDE 1250 MG: 10 INJECTION, POWDER, LYOPHILIZED, FOR SOLUTION INTRAVENOUS at 21:05

## 2017-07-09 RX ADMIN — ALBUTEROL SULFATE 2.5 MG: 2.5 SOLUTION RESPIRATORY (INHALATION) at 18:05

## 2017-07-09 RX ADMIN — THIAMINE HYDROCHLORIDE 100 MG: 100 INJECTION, SOLUTION INTRAMUSCULAR; INTRAVENOUS at 09:09

## 2017-07-09 RX ADMIN — PROPOFOL 70 MCG/KG/MIN: 10 INJECTION, EMULSION INTRAVENOUS at 00:29

## 2017-07-09 RX ADMIN — HALOPERIDOL LACTATE 5 MG: 5 INJECTION, SOLUTION INTRAMUSCULAR at 01:03

## 2017-07-09 RX ADMIN — HYDROMORPHONE HYDROCHLORIDE 1 MG: 1 INJECTION, SOLUTION INTRAMUSCULAR; INTRAVENOUS; SUBCUTANEOUS at 17:14

## 2017-07-09 RX ADMIN — ALBUTEROL SULFATE 2.5 MG: 2.5 SOLUTION RESPIRATORY (INHALATION) at 11:39

## 2017-07-09 RX ADMIN — LORAZEPAM 4 MG: 2 INJECTION INTRAMUSCULAR at 05:43

## 2017-07-09 RX ADMIN — METRONIDAZOLE 500 MG: 500 INJECTION, SOLUTION INTRAVENOUS at 10:45

## 2017-07-09 NOTE — PLAN OF CARE
Problem: Patient Care Overview (Adult)  Goal: Plan of Care Review  Outcome: Ongoing (interventions implemented as appropriate)    07/09/17 6999   Coping/Psychosocial Response Interventions   Plan Of Care Reviewed With daughter;mother;sibling   Patient Care Overview   Progress no change   Outcome Evaluation   Outcome Summary/Follow up Plan patient back on cardene and still on propofol. patient did follow commands this morning but has not for most of the shift. he does respond to painful stimuli and repeated stimulus. still has copious amounts of secretions in oral cavity, have been providing oral suction as well as ET suction.

## 2017-07-09 NOTE — PROGRESS NOTES
PULMONARY AND CRITICAL CARE PROGRESS NOTE - [facility]    Patient: Nishant Savage    1965    MR# [id number]    Acct# [id number]  07/09/17   10:29 AM  Referring Provider: Cristino Encinas DO    Chief Complaint: Mechanically ventilated    Interval history: Remains on ventilator with propofol gtt infusing.  Cardene is currently off, but noted hypertension.  RN states patient has improved from a neurological standpoint, opening eyes and following some commands.  Currently FiO2 30% with O2 sat 100%.  No other aggravating or allevitating factors.         Meds:    albuterol 2.5 mg Nebulization Q6H - RT   amLODIPine 10 mg Per G Tube Q24H   ceftriaxone 2 g Intravenous Q12H   chlorhexidine 15 mL Mouth/Throat Q12H   dexamethasone 6 mg Intravenous Q6H   dextromethorphan polistirex ER 30 mg Oral BID   enoxaparin 40 mg Subcutaneous Daily   famotidine 20 mg Intravenous Q12H   levETIRAcetam 1,000 mg Intravenous Q12H   metoprolol tartrate 50 mg Nasogastric Q12H   metroNIDAZOLE 500 mg Intravenous Q8H   thiamine (VITAMIN B1) IVPB 100 mg Intravenous Daily   vancomycin 1,250 mg Intravenous Q12H       niCARdipine 5-15 mg/hr Last Rate: 5 mg/hr (07/09/17 0815)   Pharmacy to dose vancomycin     propofol 5-50 mcg/kg/min Last Rate: 70 mcg/kg/min (07/09/17 0855)     Review of Systems:   Cannot obtain due to mechanical ventilation.  The patient notably is critically ill and connected to a ventilator.  As such patient cannot communicate and provide any history whatsoever, including any history of present illness or interval history since arrival or review of systems. The interested reviewer may note this fact, as an attempt has been made at collecting and documenting these portions of the patient history, but this information is unobtainable despite attempted review and therefore cannot be documented at this time.     Ventilator Settings:  Vent Mode: VC/AC  Vt (Set, L): 0.65 L  Resp Rate (Set): 10  Pressure Support (cm  H2O): 0 cm H20  FiO2 (%): 30 %  PEEP/CPAP (cm H2O): 5 cm H20  Minute Ventilation (L/min) (Obs): 14.7 L/min  Resp Rate (Observed) Vent: 22  I:E Ratio (Set): 1:1.30  I:E Ratio (Obs): 1:1.4  PIP Observed (cm H2O): 29 cm H2O  RSBI: 202    Physical Exam:  Temp:  [98.4 °F (36.9 °C)-100 °F (37.8 °C)] 100 °F (37.8 °C)  Heart Rate:  [] 99  Resp:  [14-24] 24  BP: (110-191)/() 163/95  FiO2 (%):  [30 %] 30 %    Intake/Output Summary (Last 24 hours) at 07/09/17 1029  Last data filed at 07/09/17 0900   Gross per 24 hour   Intake           2921.6 ml   Output             2000 ml   Net            921.6 ml     SpO2 Readings from Last 3 Encounters:   07/09/17 96%   09/30/16 98%   02/04/16 96%     Physical Exam   Gen.: Sedated and intubated on mechanical ventilatory support.  HEENT: Endotracheal tube is in place. Tongue is swollen and bruised.  Neck: Supple.  Respiratory: Fair air movement bilaterally, no wheezing, mild course rhonchi worse on the right.   Cardiac: Sinus rhythm. No murmur or gallop noted.   Abdomen: Soft, distended, bowel sounds present with nutrition infusing.   Neurologic: He has been opening eyes and following commands per nursing.   Psychiatric: Cannot assess as he is sedated and intubated.  Dermatologic: No rash noted.  Extremities: SCDs are in place.  Musculoskeletal: No joint deformities noted.  Lymphatic: No adenopathy palpated    Results from last 7 days  Lab Units 07/08/17  1057 07/06/17  0222 07/05/17  0158   WBC 10*3/mm3 12.30* 9.27 15.95*   HEMOGLOBIN g/dL 10.4* 12.1* 12.8*   PLATELETS 10*3/mm3 199 216 246       Results from last 7 days  Lab Units 07/09/17  0348 07/08/17  0411 07/07/17  0204   SODIUM mmol/L 143 143 142   POTASSIUM mmol/L 3.7 3.5 3.2*   BUN mg/dL 11 11 14   CREATININE mg/dL 0.91 0.98 1.05       Results from last 7 days  Lab Units 07/09/17  0159 07/08/17  0214 07/07/17  0339   PH, ARTERIAL pH units 7.417 7.422 7.434   PCO2, ARTERIAL mm Hg 36.3 35.8 31.5*   PO2 ART mm Hg 80.8  71.1* 76.4*   FIO2 % 30 30 30        Recent films:  Imaging Results (last 24 hours)     Procedure Component Value Units Date/Time    CT Head With & Without Contrast [[id number]] Collected:  07/08/17 1233     Updated:  07/08/17 1239    Narrative:       CT BRAIN without and with contrast dated 7/8/2017 11:43 AM CDT     HISTORY: Possible sublingual abscess     COMPARISON: None      DOSE LENGTH PRODUCT: 1779 mGy cm     In order to have a CT radiation dose as low as reasonably achievable,  Automated Exposure Control was utilized for adjustment of the mA and/or  KV according to patient size.     TECHNIQUE: Serial axial tomographic images of the brain were obtained  without and with the use of intravenous contrast.      FINDINGS:   The midline structures are nondisplaced. The ventricles and basilar  cisterns are normal in size and configuration. There is no evidence of  intracranial hemorrhage or mass-effect. The gray-white matter  differentiation is maintained. The sulci have a normal configuration,  and there are no abnormal extra-axial fluid collections. The structures  of the posterior fossa are unremarkable.      The included orbits and their contents are unremarkable.    . The  visualized osseous structures and overlying soft tissues of the skull  and face are unremarkable.      There is no abnormal enhancement.       Impression:       1. Negative CT brain without and with contrast        This report was finalized on 07/08/2017 12:36 by Dr. Adolph Echols MD.    CT Soft Tissue Neck With & Without Contrast [[id number]] Collected:  07/08/17 1258     Updated:  07/08/17 1307    Narrative:       CT SOFT TISSUE NECK W WO CONTRAST- 7/8/2017 11:43 AM CDT     HISTORY: Possible sublingual or ligual abcess; R13.12-Dysphagia,  oropharyngeal phase; R56.9-Unspecified convulsions;  T78.3XXS-Angioneurotic edema, sequela; F10.231-Alcohol dependence with  withdrawal delirium      COMPARISON: NONE      DOSE LENGTH PRODUCT: 602 mGy cm.  Automated exposure control was also  utilized to decrease patient radiation dose.     TECHNIQUE: Serial helical tomographic images of the neck were obtained  in the standard fashion following the intravenous administration of  Isovue contrast.       FINDINGS:    Orbits, paranasal sinuses, and skull base: Normal     Nasopharynx: A nasogastric tube is present. Fluid is present in the  right maxillary antrum mucosal thickening is present left maxillary  antrum     Suprahyoid neck: Endotracheal tube is present. Pleural cavities obscured  by dental work. The base the tongue appears symmetric as visualized.  Submandibular glands are visualized. Sternomastoids sternocleidomastoid  muscles are symmetric.     Infrahyoid neck: Normal larynx, hypopharynx and supraglottis.     Thyroid: Normal.     Thoracic inlet: Pleural thickening is noted in the apex of the right  chest.  Lymph nodes: Normal. No lymphadenopathy.     Vascular structures: Normal.     Bones: Degenerative disc disease and degenerative spondylosis is present  in the cervical spine.     Other findings: None.       Impression:       1. No acute abnormalities identified on the enhanced CT soft tissue the  neck.  2. Nasogastric tube and endotracheal tube are present. Artifact from  dental work is noted.  3. Fluid in the right maxillary antrum is present  4. Degenerative spondylosis in the cervical spine        This report was finalized on 07/08/2017 13:04 by Dr. Adolph Echols MD.    XR Chest 1 View [[id number]] Collected:  07/09/17 0729     Updated:  07/09/17 0733    Narrative:       EXAMINATION:   XR CHEST 1 VW-  7/9/2017 7:29 AM CDT     HISTORY: Patient intubated     Single view the chest obtained. Atelectasis right lung base is noted.  Left lung is clear. Cardiac silhouettes mildly enlarged. Nasogastric  tube and endotracheal tube are satisfactorily position.       Impression:       Mild atelectasis right lung base slightly increased since  July 8  This report was  finalized on 07/09/2017 07:30 by Dr. Adolph Echols MD.        Films reviewed personally by me.  My interpretation: slight increase in R side atelectasis     Pulmonary Assessment:  1. Respiratory failure related to agitation from ethanol withdrawal.  2. Malignant HTN   3. PRES   4. Recent problems with angioedema which have resolved  5. History of chronic ethanol use  6. C-diff   7. Tongue hematoma  8. MRSA respiratory culture      Recommend:   · Continue current mechanical ventilation.  Not candidate for weaning trials 2' to neuro status, hypertension, and tongue swelling  · ENT note reviewed   · Respiratory culture with MRSA, already on Vancomycin     Electronically signed by EPI Manzo on 7/9/2017 at 10:29 AM  Physician substantive contribution:  Pertinent symptoms/interval history include: The patient is able to respond to questioning even on his Diprivan drip.  There is concern about his tongue swelling which could certainly be a problem in terms of possible extubation even if his neurologic status permits.  Respiratory exam shows pertinent findings of clear lung fields on chest exam  Plan includes: There are no plans to consider weaning the patient to extubation at present in view of his ENT and neurologic issues.  I have seen and examined patient personally, performing a face-to-face diagnostic evaluation with plan of care reviewed and developed with APRN and nursing staff. I have addended and/or modified the above history of present illness, physical examination, and assessment and plan to reflect my findings and impressions. Essential elements of the care plan were discussed with APRN above.  Agree with findings and assessment/plan as documented above.    Electronically signed by Indra Gallo MD, on 7/9/2017, 1:39 PM

## 2017-07-09 NOTE — PROGRESS NOTES
"Pharmacokinetic Follow-up Note - [facility]    Nishant Savage is a 52 y.o. male  [Ht: 67\" (170.2 cm); Wt: 232 lb 14.4 oz (106 kg)]    Estimated Creatinine Clearance: 111.5 mL/min (by C-G formula based on Cr of 0.91).   Lab Results   Component Value Date    CREATININE 0.91 07/09/2017    CREATININE 0.98 07/08/2017    CREATININE 1.05 07/07/2017      Lab Results   Component Value Date    WBC 12.30 (H) 07/08/2017    WBC 9.27 07/06/2017    WBC 15.95 (H) 07/05/2017      Microbiology Results (last 10 days)       Procedure Component Value - Date/Time      Respiratory Culture [[id number]]  (Abnormal) Collected:  07/08/17 0403    Lab Status:  Preliminary result Specimen:  Sputum from NT Suction Updated:  07/09/17 0830     Respiratory Culture Heavy growth (4+) Staphylococcus aureus (A)     BETA LACTAMASE Positive     Gram Stain Result Greater than 25 WBCs per low power field      Many (4+) Mixed gram positive evelio    Gastrointestinal Panel, PCR [[id number]]  (Abnormal) Collected:  07/06/17 2327    Lab Status:  Final result Specimen:  Stool from Per Rectum Updated:  07/07/17 0124     Campylobacter Not Detected     Clostridium difficile (toxin A/B) Detected (A)        Due to the high asymptomatic carriage rates, especially in young children, the clinical relevance of the detection of toxigenic C. difficile from stool should be considered in the context of other clinical findings, patient age, and risk factors including hospitalization and antibiotic exposure.        Plesiomonas shigelloides Not Detected     Salmonella Not Detected     Vibrio Not Detected     Vibrio cholerae Not Detected     Yersinia enterocolitica Not Detected     Enteroaggregative E. coli (EAEC) Not Detected     Enteropathogenic E. coli (EPEC) Not Detected     Enterotoxigenic E. coli (ETEC) lt/st Not Detected     Shiga-like toxin-producing E. coli (STEC) stx1/stx2 Not Detected     E. coli O157 Not Detected     Shigella/Enteroinvasive E. coli (EIEC) Not Detected "     Cryptosporidium Not Detected     Cyclospora cayetanensis Not Detected     Entamoeba histolytica Not Detected     Giardia lamblia Not Detected     Adenovirus F40/41 Not Detected     Astrovirus Not Detected     Norovirus GI/GII Not Detected     Rotavirus A Not Detected     Sapovirus (I, II, IV or V) Not Detected    Clostridium Difficile Toxin, PCR [[id number]]  (Abnormal) Collected:  07/06/17 1844    Lab Status:  Final result Specimen:  Stool from Per Rectum Updated:  07/07/17 1348     C. Difficile Toxins by PCR Positive (A)            Current Vancomycin Dose:  1250 mg IVPB every 12 hours, day 2 of therapy.    Indication for use: possible CNS infection; resp cultures growing staph    Assessment/Plan:  Vancomycin initiated last PM. SCr=0.91 (stable). Will order trough prior to AM dose tomorrow (09:00). Pharmacy will continue to monitor renal function and adjust dose accordingly.     Maureen, Pharm D  9:33 AM  7/9/2017

## 2017-07-09 NOTE — PROGRESS NOTES
Neurology Progress Note      Chief Complaint:  Seizure, Malignant HTN, PRES, EtOH w/d, Respiratory failure on Vent    Subjective     Subjective:    He remains on max dose Propofol with prn Ativan.  With that said, he started waking up overnight.  Opened eyes and following commands.  Continues to have loose stools but improving.  Off Cardene all night and blood pressures were generally controlled, although the more awake he is the more agitated he tends to get and the higher his blood pressure will go.  No seizure activity reported    Medications:  Current Facility-Administered Medications   Medication Dose Route Frequency Provider Last Rate Last Dose   • acetaminophen (TYLENOL) tablet 650 mg  650 mg Oral Q4H PRN Jairon Perez, DO   650 mg at 07/03/17 2010   • albuterol (PROVENTIL) nebulizer solution 0.083% 2.5 mg/3mL  2.5 mg Nebulization Q6H - RT Jairon Perez DO   2.5 mg at 07/09/17 0613   • amLODIPine (NORVASC) tablet 10 mg  10 mg Per G Tube Q24H Cristino Encinas, DO   10 mg at 07/08/17 0817   • cefTRIAXone (ROCEPHIN) 2 g/100 mL 0.9% NS VTB (ALEX)  2 g Intravenous Q12H Cristino Encinas, DO   2 g at 07/08/17 2052   • chlorhexidine (PERIDEX) 0.12 % solution 15 mL  15 mL Mouth/Throat Q12H Jairon Perez, DO   15 mL at 07/08/17 2030   • dextromethorphan polistirex ER (DELSYM) 30 MG/5ML oral suspension 30 mg  30 mg Oral BID Robe Dorsey, DO   30 mg at 07/08/17 1737   • enoxaparin (LOVENOX) syringe 40 mg  40 mg Subcutaneous Daily Cristino Encinas DO   40 mg at 07/08/17 0826   • famotidine (PEPCID) injection 20 mg  20 mg Intravenous Q12H Jairon Perez, DO   20 mg at 07/08/17 2030   • haloperidol lactate (HALDOL) injection 5 mg  5 mg Intravenous Q6H PRN Cristino Encinas, DO   5 mg at 07/09/17 0103   • HYDROcodone-acetaminophen (NORCO)  MG per tablet 1 tablet  1 tablet Oral Q4H PRN Jairon C Perez, DO   1 tablet at 07/03/17 1758   • HYDROcodone-acetaminophen (NORCO) 5-325 MG per tablet 1 tablet  1  tablet Oral Q4H PRN Jairon Perez DO       • HYDROmorphone (DILAUDID) injection 1 mg  1 mg Intravenous Q3H PRN Jairon Perez DO   1 mg at 07/09/17 0321    And   • naloxone (NARCAN) injection 0.4 mg  0.4 mg Intravenous Q5 Min PRN Jairon Perez DO       • levETIRAcetam in NaCl 0.75% (KEPPRA) IVPB 1,000 mg  1,000 mg Intravenous Q12H Royal Campos MD 0 mL/hr at 07/07/17 0850 1,000 mg at 07/08/17 2030   • LORazepam (ATIVAN) tablet 1 mg  1 mg Oral Q2H PRN Cristino Encinas DO        Or   • LORazepam (ATIVAN) injection 1 mg  1 mg Intravenous Q2H PRN Cristino Encinas DO        Or   • LORazepam (ATIVAN) tablet 2 mg  2 mg Oral Q1H PRN Cristino Encinas DO        Or   • LORazepam (ATIVAN) injection 2 mg  2 mg Intravenous Q1H PRN Cristino Encinas DO   2 mg at 07/09/17 0336    Or   • LORazepam (ATIVAN) injection 2 mg  2 mg Intravenous Q15 Min PRN Cristino Encinas DO        Or   • LORazepam (ATIVAN) injection 2 mg  2 mg Intramuscular Q15 Min PRN Cristino Encinas DO        Or   • LORazepam (ATIVAN) tablet 4 mg  4 mg Oral Q1H PRN Cristino Encinas DO        Or   • LORazepam (ATIVAN) injection 4 mg  4 mg Intravenous Q1H PRN Cristino Encinas DO   4 mg at 07/09/17 0543   • metoprolol tartrate (LOPRESSOR) tablet 50 mg  50 mg Nasogastric Q12H Jairon Perez, DO   50 mg at 07/08/17 2030   • metroNIDAZOLE (FLAGYL) IVPB 500 mg  500 mg Intravenous Q8H Robe Dorsey, DO   500 mg at 07/09/17 0225   • niCARdipine (CARDENE) 25 mg/250 mL (0.1 mg/mL) 0.9% NS infusion  5-15 mg/hr Intravenous Titrated Jairon Perez, DO   Stopped at 07/09/17 0422   • ondansetron (ZOFRAN) injection 4 mg  4 mg Intravenous Q6H PRN Jairon Perez DO       • Pharmacy to dose vancomycin   Does not apply Continuous PRN Cristino Encinas DO       • propofol (DIPRIVAN) infusion 10 mg/mL 100 mL  5-50 mcg/kg/min Intravenous Titrated Jairon Perez DO 34.8 mL/hr at 07/09/17 0625 70 mcg/kg/min at 07/09/17 0625   • sodium chloride 0.9 %  flush 1-10 mL  1-10 mL Intravenous PRN Jairon Perez DO       • thiamine (B-1) 100 mg in sodium chloride 0.9 % 100 mL IVPB  100 mg Intravenous Daily Jairon Perez  mL/hr at 07/08/17 0817 100 mg at 07/08/17 0817   • vancomycin 1250 mg/250 mL 0.9% NS IVPB ([facility])  1,250 mg Intravenous Q12H Cristino Encinas DO           Review of Systems:   Could not obtain      Objective      Vital Signs  Temp:  [98.4 °F (36.9 °C)-99 °F (37.2 °C)] 99 °F (37.2 °C)  Heart Rate:  [] 95  Resp:  [14-22] 20  BP: (110-191)/() 186/101  FiO2 (%):  [30 %] 30 %    Physical Exam:  Heavily sedated  NC/AT, tongue remains swollen  Sclera anicteric  Coarse BS bilaterally  RRR  Abdomen distended  2+ edema in feet and hands    Neuro:  Awakens to loud voice and pain.  Opens eyes  --Squeezes hand and wiggles toes  --Will track some with eyes  --PERRL  --Positive Blink  --Moving all extremities, more so off sedation  --No w/d to pain currently  --DTRs absent throughout, equivocal [place]i     Results Review:    I reviewed the patient's new clinical results.      Results from last 7 days  Lab Units 07/08/17  1057 07/06/17  0222 07/05/17  0158   WBC 10*3/mm3 12.30* 9.27 15.95*   HEMOGLOBIN g/dL 10.4* 12.1* 12.8*   HEMATOCRIT % 31.5* 35.4* 38.1*   PLATELETS 10*3/mm3 199 216 246          Results from last 7 days  Lab Units 07/09/17  0348 07/08/17  0411 07/07/17  0204  07/03/17  0807   SODIUM mmol/L 143 143 142  < > 141   POTASSIUM mmol/L 3.7 3.5 3.2*  < > 3.0*   CHLORIDE mmol/L 111* 111* 111*  < > 102   CO2 mmol/L 22.0* 21.0* 23.0*  < > 29.0   BUN mg/dL 11 11 14  < > 9   CREATININE mg/dL 0.91 0.98 1.05  < > 1.19   CALCIUM mg/dL 8.0* 8.0* 8.0*  < > 8.8   BILIRUBIN mg/dL  --   --   --   --  0.6   ALK PHOS U/L  --   --   --   --  69   ALT (SGPT) U/L  --   --   --   --  39   AST (SGOT) U/L  --   --   --   --  72*   GLUCOSE mg/dL 108* 131* 119*  < > 127*   < > = values in this interval not displayed.     Lab Results   Component Value Date     MG 2.1 07/06/2017     No components found for: POCGLUC  No components found for: A1C  Lab Results   Component Value Date    HDL 36 02/04/2016     No components found for: B12  No results found for: TSH    Assessment/Plan     Hospital Problem List    Active Problems:    Angio-edema    Seizures    Impression  52 year old admitted with seizures, malignant HTN, PRES, EtOH w/d. Agitation, intubated, sedated, C.diff, low grade temps      Plan  1. PRES  --BPS generally are more stable.  Off Cardene currently but use as needed to maintain more normalized BPS, DBP < 100  --No overt seizure activity reported  --EEG showed no evidence of status. --On Keppra to 1000mg IV BID.  Will remain on this until MRI normal, recheck in 6 weeks  --Attempted LP yesterday for fevers and continue AMS.  After 2 passes stopped as patient on Lovenox.  Started empiric coverage for now although doubt CNS infection  --Overall, some improvement this morning      2. DTs  --On Ativan, Propofol  --Intubated  --On Thiamine, FA  --Wean as tolerated     3. C.diff  --likely source of fevers  --On Flagyl      4. Tongue swelling: Appears about the same. Intubated currently    5.  GI:  Jevitiy 1.2 at 50/hr TF    6.  Resp:  Intubated, on Vent      More optimistic this morning with exam.   Will follow with serial exams.      Royal Campos MD  07/09/17  7:26 AM

## 2017-07-09 NOTE — PLAN OF CARE
Problem: Patient Care Overview (Adult)  Goal: Plan of Care Review  Outcome: Ongoing (interventions implemented as appropriate)    07/09/17 0455   Coping/Psychosocial Response Interventions   Plan Of Care Reviewed With patient;family   Patient Care Overview   Progress improving   Outcome Evaluation   Outcome Summary/Follow up Plan cardene off most of night. Pt now following commands. BP still rises with sedation paused. Pt continues to have copious amounts of secretions.        Goal: Adult Individualization and Mutuality  Outcome: Ongoing (interventions implemented as appropriate)  Goal: Discharge Needs Assessment  Outcome: Ongoing (interventions implemented as appropriate)    Problem: Seizure Disorder/Epilepsy (Adult)  Goal: Signs and Symptoms of Listed Potential Problems Will be Absent or Manageable (Seizure Disorder/Epilepsy)  Outcome: Ongoing (interventions implemented as appropriate)    Problem: Fall Risk (Adult)  Goal: Absence of Falls  Outcome: Ongoing (interventions implemented as appropriate)    Problem: Pressure Ulcer Risk (Roman Scale) (Adult,Obstetrics,Pediatric)  Goal: Identify Related Risk Factors and Signs and Symptoms  Outcome: Ongoing (interventions implemented as appropriate)  Goal: Skin Integrity  Outcome: Ongoing (interventions implemented as appropriate)    Problem: SAFETY - NON-VIOLENT RESTRAINT  Goal: Remains free of injury from restraints (Non-Violent Restraint)  Outcome: Ongoing (interventions implemented as appropriate)  Goal: Free from restraint(s) (Non-Violent Restraint)  Outcome: Ongoing (interventions implemented as appropriate)    Problem: Nutrition, Enteral (Adult)  Goal: Signs and Symptoms of Listed Potential Problems Will be Absent or Manageable (Nutrition, Enteral)  Outcome: Ongoing (interventions implemented as appropriate)

## 2017-07-09 NOTE — PROGRESS NOTES
[facility] Medicine Services  INPATIENT PROGRESS NOTE    Length of Stay: 5  Date of Admission: 7/3/2017  Primary Care Physician: Linda Hoffman, DNP, APRN    Subjective     Chief Complaint:     The patient is intubated and sedated    HPI     Able to follow commands when off sedation.  Less agitated off sedation now. Purulent sputum and oral secretions still present.  Tongue swelling still persistent. The patient continues to bite down on his tongue was continuously. Urine is dark. His Padron is draining well.   He continues to require maximum dose propofol and intermittent Ativan.        Review of Systems   Unable to obtain secondary to intubation and sedation  All pertinent negatives and positives are as above. All other systems have been reviewed and are negative unless otherwise stated.     Objective    Temp:  [98.4 °F (36.9 °C)-100 °F (37.8 °C)] 100 °F (37.8 °C)  Heart Rate:  [] 99  Resp:  [14-24] 24  BP: (110-191)/() 163/95  FiO2 (%):  [30 %] 30 %     Specimen:  Blood from Arm, Right Updated:  07/08/17 1118     WBC 12.30 (H) 10*3/mm3      RBC 3.49 (L) 10*6/mm3      Hemoglobin 10.4 (L) g/dL      Hematocrit 31.5 (L) %      MCV 90.3 fL      MCH 29.8 pg      MCHC 33.0 g/dL      RDW 14.6 %      RDW-SD 48.3 fl      MPV 9.8 fL      Platelets 199 10*3/mm3      Neutrophil % 71.1 %      Lymphocyte % 11.7 (L) %      Monocyte % 13.4 (H) %      Eosinophil % 2.6 %      Basophil % 0.2 %      Immature Grans % 1.0 %      Neutrophils, Absolute 8.75 (H) 10*3/mm3      Lymphocytes, Absolute 1.44 10*3/mm3      Monocytes, Absolute 1.65 (H) 10*3/mm3      Eosinophils, Absolute 0.32 10*3/mm3      Basophils, Absolute 0.02 10*3/mm3      Immature Grans, Absolute 0.12 (H) 10*3/mm3     Blood Gas, Arterial [[id number]] Collected:  07/09/17 0159    Specimen:  Arterial Blood Updated:  07/09/17 0203     Site Arterial: right radial     Gera's Test --      Documented in Rapid Comm        pH,  Arterial 7.417 pH units      pCO2, Arterial 36.3 mm Hg      pO2, Arterial 80.8 mm Hg      HCO3, Arterial 22.9 mmol/L      Base Excess, Arterial -1.1 mmol/L      O2 Saturation, Arterial 96.2 %      O2 Saturation Calculated 96.2 %      Barometric Pressure for Blood Gas -- mmHg       Component not reported at this site.        Modality Ventilator     FIO2 30 %      Ventilator Mode Assist     Rate 10.0 Breaths/minute      PEEP 5.0     Vent CPAP/PEEP 5.0    Narrative:       Serial Number: [id number]    : [id number]    Basic Metabolic Panel [[id number]]  (Abnormal) Collected:  07/09/17 0348    Specimen:  Blood Updated:  07/09/17 0406     Glucose 108 (H) mg/dL      BUN 11 mg/dL      Creatinine 0.91 mg/dL      Sodium 143 mmol/L      Potassium 3.7 mmol/L      Chloride 111 (H) mmol/L      CO2 22.0 (L) mmol/L      Calcium 8.0 (L) mg/dL      eGFR Non African Amer 88 mL/min/1.73      BUN/Creatinine Ratio 12.1     Anion Gap 10.0 mmol/L     Respiratory Culture [[id number]]  (Abnormal) Collected:  07/08/17 0403    Specimen:  Sputum from NT Suction Updated:  07/09/17 0830     Respiratory Culture Heavy growth (4+) Staphylococcus aureus (A)     BETA LACTAMASE Positive     Gram Stain Result Greater than 25 WBCs per low power field      Many (4+) Mixed gram positive evelio          Imaging Results (last 24 hours)     Procedure Component Value Units Date/Time    CT Head With & Without Contrast [[id number]] Collected:  07/08/17 1233     Updated:  07/08/17 1239    Narrative:       CT BRAIN without and with contrast dated 7/8/2017 11:43 AM CDT     HISTORY: Possible sublingual abscess     COMPARISON: None      DOSE LENGTH PRODUCT: 1779 mGy cm     In order to have a CT radiation dose as low as reasonably achievable,  Automated Exposure Control was utilized for adjustment of the mA and/or  KV according to patient size.     TECHNIQUE: Serial axial tomographic images of the brain were obtained  without and with the use of intravenous contrast.         FINDINGS:   The midline structures are nondisplaced. The ventricles and basilar  cisterns are normal in size and configuration. There is no evidence of  intracranial hemorrhage or mass-effect. The gray-white matter  differentiation is maintained. The sulci have a normal configuration,  and there are no abnormal extra-axial fluid collections. The structures  of the posterior fossa are unremarkable.      The included orbits and their contents are unremarkable.    . The  visualized osseous structures and overlying soft tissues of the skull  and face are unremarkable.      There is no abnormal enhancement.       Impression:       1. Negative CT brain without and with contrast        This report was finalized on 07/08/2017 12:36 by Dr. Adolph Echols MD.    CT Soft Tissue Neck With & Without Contrast [[id number]] Collected:  07/08/17 1258     Updated:  07/08/17 1307    Narrative:       CT SOFT TISSUE NECK W WO CONTRAST- 7/8/2017 11:43 AM CDT     HISTORY: Possible sublingual or ligual abcess; R13.12-Dysphagia,  oropharyngeal phase; R56.9-Unspecified convulsions;  T78.3XXS-Angioneurotic edema, sequela; F10.231-Alcohol dependence with  withdrawal delirium      COMPARISON: NONE      DOSE LENGTH PRODUCT: 602 mGy cm. Automated exposure control was also  utilized to decrease patient radiation dose.     TECHNIQUE: Serial helical tomographic images of the neck were obtained  in the standard fashion following the intravenous administration of  Isovue contrast.       FINDINGS:    Orbits, paranasal sinuses, and skull base: Normal     Nasopharynx: A nasogastric tube is present. Fluid is present in the  right maxillary antrum mucosal thickening is present left maxillary  antrum     Suprahyoid neck: Endotracheal tube is present. Pleural cavities obscured  by dental work. The base the tongue appears symmetric as visualized.  Submandibular glands are visualized. Sternomastoids sternocleidomastoid  muscles are symmetric.     Infrahyoid  neck: Normal larynx, hypopharynx and supraglottis.     Thyroid: Normal.     Thoracic inlet: Pleural thickening is noted in the apex of the right  chest.  Lymph nodes: Normal. No lymphadenopathy.     Vascular structures: Normal.     Bones: Degenerative disc disease and degenerative spondylosis is present  in the cervical spine.     Other findings: None.       Impression:       1. No acute abnormalities identified on the enhanced CT soft tissue the  neck.  2. Nasogastric tube and endotracheal tube are present. Artifact from  dental work is noted.  3. Fluid in the right maxillary antrum is present  4. Degenerative spondylosis in the cervical spine        This report was finalized on 07/08/2017 13:04 by Dr. Adolph Echols MD.    XR Chest 1 View [[id number]] Collected:  07/09/17 0729     Updated:  07/09/17 0733    Narrative:       EXAMINATION:   XR CHEST 1 VW-  7/9/2017 7:29 AM CDT     HISTORY: Patient intubated     Single view the chest obtained. Atelectasis right lung base is noted.  Left lung is clear. Cardiac silhouettes mildly enlarged. Nasogastric  tube and endotracheal tube are satisfactorily position.       Impression:       Mild atelectasis right lung base slightly increased since  July 8  This report was finalized on 07/09/2017 07:30 by Dr. Adolph Echols MD.             Intake/Output Summary (Last 24 hours) at 07/09/17 1011  Last data filed at 07/09/17 0900   Gross per 24 hour   Intake           2921.6 ml   Output             2000 ml   Net            921.6 ml       Physical Exam    Constitutional: No distress.   Seen and discussed with his nurse, Bao. No family present. He responds to Ativan plus Diprivan sedation well. ETT secure, Central line present.    HENT:   Head: Normocephalic and atraumatic.   Eyes: Pupils are equal, round, and reactive to light.  The patient's tongue continues to swell and there is perhaps a bit less malodoruos purulent drainage noted from the oral cavity.   Neck: Neck supple. No JVD  present.   Cardiovascular: Normal rate and regular rhythm.   Pulmonary/Chest: Effort normal and breath sounds normal.   Ventilated.    Abdominal: Soft. Bowel sounds are normal.   Musculoskeletal: He exhibits edema (trace).   Neurological:   Currently he is sedated with propofol and Ativan.  Discussed case with ENT.  Not appropriate for extubation because of his lingual swelling.  He will likely require a tracheostomy.  Patient is more responsive and will follow commands such as moving feet and hands but still becomes agitated off sedation.  Skin: Skin is warm and dry.          Results Review:  I have reviewed the labs, radiology results, and diagnostic studies since my last progress note and made treatment changes reflective of the results.   I have reviewed the current medications.    Assessment/Plan     Hospital Problem List     Angio-edema    Seizures      1. Possible angioedema related to lisinopril.  2. Malignant hypertension.  3. Possible new onset seizure disorder versus convulsive syncope.  4. Posterior reversible encephalopathy syndrome on MRI.  5. Tongue bite with sublingual hematoma.  6. Tobacco abuse.  7. Obstructive sleep apnea, noncompliant with device.  8. Gouty arthritis.  9. Daily alcohol use with Delirium tremens requiring sedation and mechanical ventilation.  10. Hypokalemia.   11. C. Difficile colitilis  12. Lingual or sublingual infection or abcess    PLAN:    Will discuss trach with family.  Decadron 6mg q 6 hoiurs  UA with C&S if indicated  CMP and CBC in a.m.  Continue current sedation      Cristino Encinas DO   07/09/17     Approximately 45 minutes of critical care time were spent managing the patient exclusive of billable procedures.     10:11 AM

## 2017-07-09 NOTE — PLAN OF CARE
Problem: Patient Care Overview (Adult)  Goal: Adult Individualization and Mutuality  Outcome: Ongoing (interventions implemented as appropriate)    07/09/17 1637   Individualization   Patient Specific Preferences have been using lubrucant on patients tongue throughout the day due to tongue protrusion

## 2017-07-09 NOTE — PLAN OF CARE
Problem: Pressure Ulcer Risk (Roman Scale) (Adult,Obstetrics,Pediatric)  Goal: Skin Integrity  Outcome: Ongoing (interventions implemented as appropriate)    07/09/17 1631   Pressure Ulcer Risk (Roman Scale) (Adult,Obstetrics,Pediatric)   Skin Integrity making progress toward outcome

## 2017-07-09 NOTE — PROGRESS NOTES
"Pharmacokinetic Initial Note - Vancomycin    Nishant JAIME Savage is a 52 y.o. male   [Ht: 67\" (170.2 cm); Wt: 202 lb 14.4 oz (92 kg)]    Estimated Creatinine Clearance: 96.5 mL/min (by C-G formula based on Cr of 0.98).   Lab Results   Component Value Date    CREATININE 0.98 07/08/2017    CREATININE 1.05 07/07/2017    CREATININE 0.98 07/06/2017      Lab Results   Component Value Date    WBC 12.30 (H) 07/08/2017    WBC 9.27 07/06/2017    WBC 15.95 (H) 07/05/2017      Baseline culture results:  Microbiology Results (last 10 days)     Procedure Component Value - Date/Time    Respiratory Culture [[id number]] Collected:  07/08/17 0403    Lab Status:  Preliminary result Specimen:  Sputum from NT Suction Updated:  07/08/17 1138     Gram Stain Result Greater than 25 WBCs per low power field      Many (4+) Mixed gram positive evelio    Gastrointestinal Panel, PCR [[id number]]  (Abnormal) Collected:  07/06/17 0247    Lab Status:  Final result Specimen:  Stool from Per Rectum Updated:  07/07/17 0124     Campylobacter Not Detected     Clostridium difficile (toxin A/B) Detected (A)        Due to the high asymptomatic carriage rates, especially in young children, the clinical relevance of the detection of toxigenic C. difficile from stool should be considered in the context of other clinical findings, patient age, and risk factors including hospitalization and antibiotic exposure.        Plesiomonas shigelloides Not Detected     Salmonella Not Detected     Vibrio Not Detected     Vibrio cholerae Not Detected     Yersinia enterocolitica Not Detected     Enteroaggregative E. coli (EAEC) Not Detected     Enteropathogenic E. coli (EPEC) Not Detected     Enterotoxigenic E. coli (ETEC) lt/st Not Detected     Shiga-like toxin-producing E. coli (STEC) stx1/stx2 Not Detected     E. coli O157 Not Detected     Shigella/Enteroinvasive E. coli (EIEC) Not Detected     Cryptosporidium Not Detected     Cyclospora cayetanensis Not Detected     Entamoeba " histolytica Not Detected     Giardia lamblia Not Detected     Adenovirus F40/41 Not Detected     Astrovirus Not Detected     Norovirus GI/GII Not Detected     Rotavirus A Not Detected     Sapovirus (I, II, IV or V) Not Detected    Clostridium Difficile Toxin, PCR [[id number]]  (Abnormal) Collected:  07/06/17 1844    Lab Status:  Final result Specimen:  Stool from Per Rectum Updated:  07/07/17 1348     C. Difficile Toxins by PCR Positive (A)          Indication for use: Central Nervous System Infection     Assessment/Plan  Initiated Vancomycin 2000 mg IVPB once, followed by 1250 mg Q12H. Will order Vancomycin trough when pharmacokinetically appropriate.  Pharmacy will monitor renal function and adjust dose accordingly.    SRINIVAS Cummins RPH  07/08/17 7:11 PM

## 2017-07-09 NOTE — PLAN OF CARE
Problem: Patient Care Overview (Adult)  Goal: Plan of Care Review  Outcome: Ongoing (interventions implemented as appropriate)    07/09/17 1641   Coping/Psychosocial Response Interventions   Plan Of Care Reviewed With daughter;mother;sibling   Patient Care Overview   Progress no change   Outcome Evaluation   Outcome Summary/Follow up Plan ENT advised Dr Encinas that steroid would be beneficial to care, this order was added. Also added was order for oral suspension ibuprofen for fever, patient is now afebrile.

## 2017-07-09 NOTE — PROGRESS NOTES
[facility] ([facility]) Progress Note:       LOS: 5 days   Patient Care Team:  Linda Hoffman DNP, APRN as PCP - General  Linda Hoffman DNP, APRN as PCP - Family Medicine    Active consulting complaint: Tongue swelling per nurses    Subjective     Interval History:     Status of active consulting problem: Tongue swelling/angioedema    History taken from: patient RN    Review of Systems:    Review of Systems  Tongue swelling per nurse  Objective     Vital Signs  Temp:  [98.4 °F (36.9 °C)-100 °F (37.8 °C)] 100 °F (37.8 °C)  Heart Rate:  [] 99  Resp:  [14-24] 24  BP: (110-191)/() 163/95  FiO2 (%):  [30 %] 30 %    Physical Exam:   Physical Exam     CONSTITUTIONAL: well nourished sedate on ventilator     COMMUNICATION AND VOICE: sedated    HEAD: normocephalic, no lesions, atraumatic, no tenderness, no masses     FACE: appearance normal, no lesions, no tenderness, no deformities, facial motion symmetric    SALIVARY GLANDS: parotid glands with no tenderness, no swelling, no masses, submandibular glands with normal size, nontender    EYES: eyelids normal, orbits normal, no proptosis, conjunctiva normal , pupils equal, round     EARS:  Hearing: response to conversational voice normal bilaterally   External Ears: auricles without lesions      NOSE:  External Nose: structure normal, no tenderness on palpation, no nasal discharge, no lesions, no evidence of trauma, nostrils patent   Intranasal Exam: nasal mucosa normal, vestibule within normal limits, inferior turbinate normal, nasal septum midline       ORAL:  Lips: upper and lower lips without lesion   Teeth: dentition within normal limits for age   Gums: gingivae healthy   Oral Mucosa: oral mucosa normal, no mucosal lesions   Floor of Mouth: Warthin’s duct patent, mucosa normal  Tongue: tongue edema with left ventral/lingual ulcerations (resting bite into tongue)  Palate: unable to visualize posterior pharynx  Oropharynx: oropharyngeal mucosa normal    NECK:  neck appearance normal, no mass,  noted without erythema or tenderness    THYROID: no overt thyromegaly, no tenderness, nodules or mass present on palpation, position midline     LYMPH NODES: no lymphadenopathy    CHEST/RESPIRATORY: respiratory effort normal - per vent    CARDIOVASCULAR: rate and rhythm normal, extremities without cyanosis or edema      NEUROLOGIC/PSYCHIATRIC:sedate on diprivan (agitated off of sedation)    Results Review:       Lab Results (last 24 hours)     Procedure Component Value Units Date/Time    CBC & Differential [[id number]] Collected:  07/08/17 1057    Specimen:  Blood Updated:  07/08/17 1118    Narrative:       The following orders were created for panel order CBC & Differential.  Procedure                               Abnormality         Status                     ---------                               -----------         ------                     CBC Auto Differential[[id number]]        Abnormal            Final result                 Please view results for these tests on the individual orders.    CBC Auto Differential [[id number]]  (Abnormal) Collected:  07/08/17 1057    Specimen:  Blood from Arm, Right Updated:  07/08/17 1118     WBC 12.30 (H) 10*3/mm3      RBC 3.49 (L) 10*6/mm3      Hemoglobin 10.4 (L) g/dL      Hematocrit 31.5 (L) %      MCV 90.3 fL      MCH 29.8 pg      MCHC 33.0 g/dL      RDW 14.6 %      RDW-SD 48.3 fl      MPV 9.8 fL      Platelets 199 10*3/mm3      Neutrophil % 71.1 %      Lymphocyte % 11.7 (L) %      Monocyte % 13.4 (H) %      Eosinophil % 2.6 %      Basophil % 0.2 %      Immature Grans % 1.0 %      Neutrophils, Absolute 8.75 (H) 10*3/mm3      Lymphocytes, Absolute 1.44 10*3/mm3      Monocytes, Absolute 1.65 (H) 10*3/mm3      Eosinophils, Absolute 0.32 10*3/mm3      Basophils, Absolute 0.02 10*3/mm3      Immature Grans, Absolute 0.12 (H) 10*3/mm3     Blood Gas, Arterial [[id number]] Collected:  07/09/17 0159    Specimen:  Arterial Blood Updated:  07/09/17  0203     Site Arterial: right radial     Gera's Test --      Documented in Rapid Comm        pH, Arterial 7.417 pH units      pCO2, Arterial 36.3 mm Hg      pO2, Arterial 80.8 mm Hg      HCO3, Arterial 22.9 mmol/L      Base Excess, Arterial -1.1 mmol/L      O2 Saturation, Arterial 96.2 %      O2 Saturation Calculated 96.2 %      Barometric Pressure for Blood Gas -- mmHg       Component not reported at this site.        Modality Ventilator     FIO2 30 %      Ventilator Mode Assist     Rate 10.0 Breaths/minute      PEEP 5.0     Vent CPAP/PEEP 5.0    Narrative:       Serial Number: [id number]    : [id number]    Basic Metabolic Panel [[id number]]  (Abnormal) Collected:  07/09/17 0348    Specimen:  Blood Updated:  07/09/17 0406     Glucose 108 (H) mg/dL      BUN 11 mg/dL      Creatinine 0.91 mg/dL      Sodium 143 mmol/L      Potassium 3.7 mmol/L      Chloride 111 (H) mmol/L      CO2 22.0 (L) mmol/L      Calcium 8.0 (L) mg/dL      eGFR Non African Amer 88 mL/min/1.73      BUN/Creatinine Ratio 12.1     Anion Gap 10.0 mmol/L     Narrative:       GFR Normal >60  Chronic Kidney Disease <60  Kidney Failure <15    Respiratory Culture [[id number]]  (Abnormal) Collected:  07/08/17 0403    Specimen:  Sputum from NT Suction Updated:  07/09/17 0830     Respiratory Culture Heavy growth (4+) Staphylococcus aureus (A)     BETA LACTAMASE Positive     Gram Stain Result Greater than 25 WBCs per low power field      Many (4+) Mixed gram positive evelio        Imaging Results (last 24 hours)     Procedure Component Value Units Date/Time    CT Head With & Without Contrast [[id number]] Collected:  07/08/17 1233     Updated:  07/08/17 1239    Narrative:       CT BRAIN without and with contrast dated 7/8/2017 11:43 AM CDT     HISTORY: Possible sublingual abscess     COMPARISON: None      DOSE LENGTH PRODUCT: 1779 mGy cm     In order to have a CT radiation dose as low as reasonably achievable,  Automated Exposure Control was utilized for adjustment  of the mA and/or  KV according to patient size.     TECHNIQUE: Serial axial tomographic images of the brain were obtained  without and with the use of intravenous contrast.      FINDINGS:   The midline structures are nondisplaced. The ventricles and basilar  cisterns are normal in size and configuration. There is no evidence of  intracranial hemorrhage or mass-effect. The gray-white matter  differentiation is maintained. The sulci have a normal configuration,  and there are no abnormal extra-axial fluid collections. The structures  of the posterior fossa are unremarkable.      The included orbits and their contents are unremarkable.    . The  visualized osseous structures and overlying soft tissues of the skull  and face are unremarkable.      There is no abnormal enhancement.       Impression:       1. Negative CT brain without and with contrast        This report was finalized on 07/08/2017 12:36 by Dr. Adolph Echols MD.    CT Soft Tissue Neck With & Without Contrast [[id number]] Collected:  07/08/17 1258     Updated:  07/08/17 1307    Narrative:       CT SOFT TISSUE NECK W WO CONTRAST- 7/8/2017 11:43 AM CDT     HISTORY: Possible sublingual or ligual abcess; R13.12-Dysphagia,  oropharyngeal phase; R56.9-Unspecified convulsions;  T78.3XXS-Angioneurotic edema, sequela; F10.231-Alcohol dependence with  withdrawal delirium      COMPARISON: NONE      DOSE LENGTH PRODUCT: 602 mGy cm. Automated exposure control was also  utilized to decrease patient radiation dose.     TECHNIQUE: Serial helical tomographic images of the neck were obtained  in the standard fashion following the intravenous administration of  Isovue contrast.       FINDINGS:    Orbits, paranasal sinuses, and skull base: Normal     Nasopharynx: A nasogastric tube is present. Fluid is present in the  right maxillary antrum mucosal thickening is present left maxillary  antrum     Suprahyoid neck: Endotracheal tube is present. Pleural cavities obscured  by  dental work. The base the tongue appears symmetric as visualized.  Submandibular glands are visualized. Sternomastoids sternocleidomastoid  muscles are symmetric.     Infrahyoid neck: Normal larynx, hypopharynx and supraglottis.     Thyroid: Normal.     Thoracic inlet: Pleural thickening is noted in the apex of the right  chest.  Lymph nodes: Normal. No lymphadenopathy.     Vascular structures: Normal.     Bones: Degenerative disc disease and degenerative spondylosis is present  in the cervical spine.     Other findings: None.       Impression:       1. No acute abnormalities identified on the enhanced CT soft tissue the  neck.  2. Nasogastric tube and endotracheal tube are present. Artifact from  dental work is noted.  3. Fluid in the right maxillary antrum is present  4. Degenerative spondylosis in the cervical spine        This report was finalized on 07/08/2017 13:04 by Dr. Adolph Echols MD.    XR Chest 1 View [[id number]] Collected:  07/09/17 0729     Updated:  07/09/17 0733    Narrative:       EXAMINATION:   XR CHEST 1 VW-  7/9/2017 7:29 AM CDT     HISTORY: Patient intubated     Single view the chest obtained. Atelectasis right lung base is noted.  Left lung is clear. Cardiac silhouettes mildly enlarged. Nasogastric  tube and endotracheal tube are satisfactorily position.       Impression:       Mild atelectasis right lung base slightly increased since  July 8  This report was finalized on 07/09/2017 07:30 by Dr. Adolph Echols MD.          Medication Review:     Current Facility-Administered Medications   Medication Dose Route Frequency Provider Last Rate Last Dose   • acetaminophen (TYLENOL) tablet 650 mg  650 mg Oral Q4H PRN Jairon Perez DO   650 mg at 07/09/17 1003   • albuterol (PROVENTIL) nebulizer solution 0.083% 2.5 mg/3mL  2.5 mg Nebulization Q6H - RT Jairon Perez DO   2.5 mg at 07/09/17 0613   • amLODIPine (NORVASC) tablet 10 mg  10 mg Per G Tube Q24H Cristino Encinas, DO   10 mg at 07/09/17  0854   • cefTRIAXone (ROCEPHIN) 2 g/100 mL 0.9% NS VTB (ALEX)  2 g Intravenous Q12H Cristino Encinas, DO   2 g at 07/09/17 0744   • chlorhexidine (PERIDEX) 0.12 % solution 15 mL  15 mL Mouth/Throat Q12H Jairon Perez, DO   15 mL at 07/09/17 0853   • dextromethorphan polistirex ER (DELSYM) 30 MG/5ML oral suspension 30 mg  30 mg Oral BID Robe Dorsey, DO   30 mg at 07/09/17 0909   • enoxaparin (LOVENOX) syringe 40 mg  40 mg Subcutaneous Daily Cristino Encinas, DO   40 mg at 07/09/17 0855   • famotidine (PEPCID) injection 20 mg  20 mg Intravenous Q12H Jairon Perez, DO   20 mg at 07/09/17 0853   • haloperidol lactate (HALDOL) injection 5 mg  5 mg Intravenous Q6H PRN Cristino Encinas, DO   5 mg at 07/09/17 0727   • HYDROcodone-acetaminophen (NORCO)  MG per tablet 1 tablet  1 tablet Oral Q4H PRN Jairon Perez DO   1 tablet at 07/03/17 1758   • HYDROcodone-acetaminophen (NORCO) 5-325 MG per tablet 1 tablet  1 tablet Oral Q4H PRN Jairon Perez DO       • HYDROmorphone (DILAUDID) injection 1 mg  1 mg Intravenous Q3H PRN Jairon Perez DO   1 mg at 07/09/17 0321    And   • naloxone (NARCAN) injection 0.4 mg  0.4 mg Intravenous Q5 Min PRN Jairon Perez DO       • levETIRAcetam in NaCl 0.75% (KEPPRA) IVPB 1,000 mg  1,000 mg Intravenous Q12H Royal Campos MD 0 mL/hr at 07/07/17 0850 1,000 mg at 07/09/17 0854   • LORazepam (ATIVAN) tablet 1 mg  1 mg Oral Q2H PRN Cristino Encinas DO        Or   • LORazepam (ATIVAN) injection 1 mg  1 mg Intravenous Q2H PRN Cristino Encinas DO        Or   • LORazepam (ATIVAN) tablet 2 mg  2 mg Oral Q1H PRN Cristino Encinas, DO        Or   • LORazepam (ATIVAN) injection 2 mg  2 mg Intravenous Q1H PRN Cristino Encinas, DO   2 mg at 07/09/17 0336    Or   • LORazepam (ATIVAN) injection 2 mg  2 mg Intravenous Q15 Min PRN Cristino Encinas DO        Or   • LORazepam (ATIVAN) injection 2 mg  2 mg Intramuscular Q15 Min PRN Cristino Encinas, DO        Or   •  LORazepam (ATIVAN) tablet 4 mg  4 mg Oral Q1H PRN Cristino Encinas DO        Or   • LORazepam (ATIVAN) injection 4 mg  4 mg Intravenous Q1H PRN Cristino Encinas DO   4 mg at 07/09/17 0543   • metoprolol tartrate (LOPRESSOR) tablet 50 mg  50 mg Nasogastric Q12H Jairon Perez DO   50 mg at 07/09/17 0853   • metroNIDAZOLE (FLAGYL) IVPB 500 mg  500 mg Intravenous Q8H Robe Dorsey, DO   500 mg at 07/09/17 0225   • niCARdipine (CARDENE) 25 mg/250 mL (0.1 mg/mL) 0.9% NS infusion  5-15 mg/hr Intravenous Titrated Jairon Perez DO 50 mL/hr at 07/09/17 0815 5 mg/hr at 07/09/17 0815   • ondansetron (ZOFRAN) injection 4 mg  4 mg Intravenous Q6H PRN Jairon Perez DO       • Pharmacy to dose vancomycin   Does not apply Continuous PRN Cristino Encinas DO       • propofol (DIPRIVAN) infusion 10 mg/mL 100 mL  5-50 mcg/kg/min Intravenous Titrated Jairon Perez DO 34.8 mL/hr at 07/09/17 0855 70 mcg/kg/min at 07/09/17 0855   • sodium chloride 0.9 % flush 1-10 mL  1-10 mL Intravenous PRN Jairon Perez DO       • thiamine (B-1) 100 mg in sodium chloride 0.9 % 100 mL IVPB  100 mg Intravenous Daily Jairon Perez  mL/hr at 07/09/17 0909 100 mg at 07/09/17 0909   • vancomycin 1250 mg/250 mL 0.9% NS IVPB ([facility])  1,250 mg Intravenous Q12H Cristino Encinas DO   1,250 mg at 07/09/17 0854       Assessment/Plan     Active Problems:    Angio-edema    Seizures  Acute respiratory failure/airway compromise  Tongue ulcerations    Plan:  Continue good oral hygiene, peridex.  Do not feel that extubation is safe until tongue edema is improved.  Tracheostomy may be necessary until improvement to wean from ventilator.  Discuss with Dr. Pierson.  Discuss with Dr. Encinas to add steroids.            Lauren Aden, EPI  07/09/17  10:09 AM

## 2017-07-09 NOTE — PLAN OF CARE
Problem: Patient Care Overview (Adult)  Goal: Discharge Needs Assessment  Outcome: Ongoing (interventions implemented as appropriate)    07/09/17 5355   Discharge Needs Assessment   Concerns To Be Addressed discharge planning concerns;medication concerns;substance/tobacco abuse/use concerns   Readmission Within The Last 30 Days no previous admission in last 30 days   Current Discharge Risk financial support inadequate;substance abuse   Discharge Planning Comments patient is still on ventilator, swelling of the tongue is severe and ENT consulted. Possibility of tracheostomy in order to become extubated. Patient is on sedation medications and does not tolerate being awake on ventilator. Family has been here throughout the day and care plan has been explained to patients mother, sister and daughter.    Current Health   Outpatient/Agency/Support Group Needs inpatient rehabilitation facility (specify)   Anticipated Changes Related to Illness inability to care for self   Self-Care   Equipment Currently Used at Home none   Living Environment   Transportation Available car;family or friend will provide

## 2017-07-09 NOTE — PLAN OF CARE
Problem: Seizure Disorder/Epilepsy (Adult)  Goal: Signs and Symptoms of Listed Potential Problems Will be Absent or Manageable (Seizure Disorder/Epilepsy)  Outcome: Ongoing (interventions implemented as appropriate)    07/09/17 7562   Seizure Disorder/Epilepsy   Problems Assessed (Seizure Disorder/Epilepsy) all   Problems Present (Seizure Disorder/Epilepsy) situational response

## 2017-07-09 NOTE — PLAN OF CARE
Problem: Pressure Ulcer Risk (Roman Scale) (Adult,Obstetrics,Pediatric)  Goal: Identify Related Risk Factors and Signs and Symptoms  Outcome: Ongoing (interventions implemented as appropriate)    07/09/17 1631   Pressure Ulcer Risk (Roman Scale)   Related Risk Factors (Pressure Ulcer Risk (Roman Scale)) cognitive impairment;critical care admission;hospitalization prolonged;infection;mechanical forces;mobility impaired;tissue perfusion altered

## 2017-07-09 NOTE — PLAN OF CARE
Problem: SAFETY - NON-VIOLENT RESTRAINT  Goal: Free from restraint(s) (Non-Violent Restraint)  Outcome: Ongoing (interventions implemented as appropriate)

## 2017-07-10 ENCOUNTER — APPOINTMENT (OUTPATIENT)
Dept: GENERAL RADIOLOGY | Facility: HOSPITAL | Age: 52
End: 2017-07-10

## 2017-07-10 ENCOUNTER — APPOINTMENT (OUTPATIENT)
Dept: CT IMAGING | Facility: HOSPITAL | Age: 52
End: 2017-07-10

## 2017-07-10 LAB
ALBUMIN SERPL-MCNC: 3.4 G/DL (ref 3.5–5)
ALBUMIN/GLOB SERPL: 1.1 G/DL (ref 1.1–2.5)
ALP SERPL-CCNC: 155 U/L (ref 24–120)
ALT SERPL W P-5'-P-CCNC: 42 U/L (ref 0–54)
ANION GAP SERPL CALCULATED.3IONS-SCNC: 9 MMOL/L (ref 4–13)
ARTERIAL PATENCY WRIST A: ABNORMAL
AST SERPL-CCNC: 51 U/L (ref 7–45)
ATMOSPHERIC PRESS: ABNORMAL MMHG
B-LACTAMASE USUAL SUSC ISLT: POSITIVE
BACTERIA SPEC RESP CULT: ABNORMAL
BASE EXCESS BLDA CALC-SCNC: -1.2 MMOL/L (ref -2–2)
BDY SITE: ABNORMAL
BILIRUB SERPL-MCNC: 0.5 MG/DL (ref 0.1–1)
BUN BLD-MCNC: 13 MG/DL (ref 5–21)
BUN/CREAT SERPL: 13.3 (ref 7–25)
CALCIUM SPEC-SCNC: 8.6 MG/DL (ref 8.4–10.4)
CHLORIDE SERPL-SCNC: 111 MMOL/L (ref 98–110)
CO2 SERPL-SCNC: 24 MMOL/L (ref 24–31)
CREAT BLD-MCNC: 0.98 MG/DL (ref 0.5–1.4)
GFR SERPL CREATININE-BSD FRML MDRD: 81 ML/MIN/1.73
GLOBULIN UR ELPH-MCNC: 3.1 GM/DL
GLUCOSE BLD-MCNC: 182 MG/DL (ref 70–100)
GRAM STN SPEC: ABNORMAL
GRAM STN SPEC: ABNORMAL
HCO3 BLDA-SCNC: 22 MMOL/L (ref 22–26)
HOROWITZ INDEX BLD+IHG-RTO: 30 %
MODALITY: ABNORMAL
PCO2 BLDA: 32.9 MM HG (ref 35–45)
PEEP RESPIRATORY: 5 CM[H2O]
PH BLDA: 7.44 PH UNITS (ref 7.35–7.45)
PO2 BLDA: 55.9 MM HG (ref 80–100)
POTASSIUM BLD-SCNC: 4.1 MMOL/L (ref 3.5–5.3)
PROT SERPL-MCNC: 6.5 G/DL (ref 6.3–8.7)
SAO2 % BLDCOA: 90.6 % (ref 94–100)
SAO2 % BLDCOA: 90.6 % (ref 94–100)
SODIUM BLD-SCNC: 144 MMOL/L (ref 135–145)
TOTAL RATE: 10 BREATHS/MINUTE
VANCOMYCIN TROUGH SERPL-MCNC: 12.99 MCG/ML (ref 10–20)
VENT CPAP/PEEP: 5

## 2017-07-10 PROCEDURE — 25010000002 VANCOMYCIN 10 G RECONSTITUTED SOLUTION: Performed by: FAMILY MEDICINE

## 2017-07-10 PROCEDURE — 25010000002 FUROSEMIDE PER 20 MG: Performed by: FAMILY MEDICINE

## 2017-07-10 PROCEDURE — 25010000003 DEXAMETHASONE SODIUM PHOSPHATE 100 MG/10ML SOLUTION 1 ML VIAL: Performed by: FAMILY MEDICINE

## 2017-07-10 PROCEDURE — 94799 UNLISTED PULMONARY SVC/PX: CPT

## 2017-07-10 PROCEDURE — 36600 WITHDRAWAL OF ARTERIAL BLOOD: CPT

## 2017-07-10 PROCEDURE — 94003 VENT MGMT INPAT SUBQ DAY: CPT

## 2017-07-10 PROCEDURE — 71010 HC CHEST PA OR AP: CPT

## 2017-07-10 PROCEDURE — 80202 ASSAY OF VANCOMYCIN: CPT | Performed by: FAMILY MEDICINE

## 2017-07-10 PROCEDURE — 82803 BLOOD GASES ANY COMBINATION: CPT

## 2017-07-10 PROCEDURE — 97162 PT EVAL MOD COMPLEX 30 MIN: CPT

## 2017-07-10 PROCEDURE — 74176 CT ABD & PELVIS W/O CONTRAST: CPT

## 2017-07-10 PROCEDURE — 25010000002 THIAMINE PER 100 MG: Performed by: INTERNAL MEDICINE

## 2017-07-10 PROCEDURE — 25010000002 ALBUMIN HUMAN 25% PER 50 ML: Performed by: FAMILY MEDICINE

## 2017-07-10 PROCEDURE — 99232 SBSQ HOSP IP/OBS MODERATE 35: CPT | Performed by: PSYCHIATRY & NEUROLOGY

## 2017-07-10 PROCEDURE — 25010000002 ENOXAPARIN PER 10 MG: Performed by: FAMILY MEDICINE

## 2017-07-10 PROCEDURE — G8978 MOBILITY CURRENT STATUS: HCPCS

## 2017-07-10 PROCEDURE — 80053 COMPREHEN METABOLIC PANEL: CPT | Performed by: FAMILY MEDICINE

## 2017-07-10 PROCEDURE — 25010000002 PROPOFOL 1000 MG/ML EMULSION: Performed by: INTERNAL MEDICINE

## 2017-07-10 PROCEDURE — 25010000002 LORAZEPAM PER 2 MG: Performed by: FAMILY MEDICINE

## 2017-07-10 PROCEDURE — 25010000003 LEVETIRACETAM IN NACL 0.75% 1000 MG/100ML SOLUTION: Performed by: PSYCHIATRY & NEUROLOGY

## 2017-07-10 PROCEDURE — P9046 ALBUMIN (HUMAN), 25%, 20 ML: HCPCS | Performed by: FAMILY MEDICINE

## 2017-07-10 PROCEDURE — 25010000002 CEFTRIAXONE: Performed by: FAMILY MEDICINE

## 2017-07-10 PROCEDURE — 94760 N-INVAS EAR/PLS OXIMETRY 1: CPT

## 2017-07-10 PROCEDURE — 25010000002 HALOPERIDOL LACTATE PER 5 MG: Performed by: FAMILY MEDICINE

## 2017-07-10 PROCEDURE — 25010000002 HYDROMORPHONE PER 4 MG: Performed by: INTERNAL MEDICINE

## 2017-07-10 PROCEDURE — 94770: CPT

## 2017-07-10 PROCEDURE — G8979 MOBILITY GOAL STATUS: HCPCS

## 2017-07-10 PROCEDURE — 74000 HC ABDOMEN KUB: CPT

## 2017-07-10 RX ORDER — AMLODIPINE BESYLATE 5 MG/1
5 TABLET ORAL
Status: DISCONTINUED | OUTPATIENT
Start: 2017-07-11 | End: 2017-07-13

## 2017-07-10 RX ORDER — FAMOTIDINE 20 MG/1
20 TABLET, FILM COATED ORAL EVERY 12 HOURS SCHEDULED
Status: DISCONTINUED | OUTPATIENT
Start: 2017-07-10 | End: 2017-07-19 | Stop reason: HOSPADM

## 2017-07-10 RX ORDER — METRONIDAZOLE 500 MG/1
500 TABLET ORAL EVERY 8 HOURS SCHEDULED
Status: DISCONTINUED | OUTPATIENT
Start: 2017-07-10 | End: 2017-07-18

## 2017-07-10 RX ORDER — ALBUMIN (HUMAN) 12.5 G/50ML
25 SOLUTION INTRAVENOUS EVERY 12 HOURS SCHEDULED
Status: COMPLETED | OUTPATIENT
Start: 2017-07-10 | End: 2017-07-10

## 2017-07-10 RX ORDER — FUROSEMIDE 10 MG/ML
40 INJECTION INTRAMUSCULAR; INTRAVENOUS EVERY 12 HOURS SCHEDULED
Status: COMPLETED | OUTPATIENT
Start: 2017-07-10 | End: 2017-07-10

## 2017-07-10 RX ORDER — FUROSEMIDE 10 MG/ML
40 INJECTION INTRAMUSCULAR; INTRAVENOUS ONCE
Status: DISCONTINUED | OUTPATIENT
Start: 2017-07-10 | End: 2017-07-10

## 2017-07-10 RX ADMIN — THIAMINE HYDROCHLORIDE 100 MG: 100 INJECTION, SOLUTION INTRAMUSCULAR; INTRAVENOUS at 08:41

## 2017-07-10 RX ADMIN — VANCOMYCIN HYDROCHLORIDE 1250 MG: 10 INJECTION, POWDER, LYOPHILIZED, FOR SOLUTION INTRAVENOUS at 08:43

## 2017-07-10 RX ADMIN — PROPOFOL 75 MCG/KG/MIN: 10 INJECTION, EMULSION INTRAVENOUS at 18:40

## 2017-07-10 RX ADMIN — LEVETIRACETAM 1000 MG: 1000 INJECTION, SOLUTION INTRAVENOUS at 08:42

## 2017-07-10 RX ADMIN — CEFTRIAXONE 2 G: 2 INJECTION, POWDER, FOR SOLUTION INTRAMUSCULAR; INTRAVENOUS at 21:59

## 2017-07-10 RX ADMIN — VANCOMYCIN HYDROCHLORIDE 1250 MG: 10 INJECTION, POWDER, LYOPHILIZED, FOR SOLUTION INTRAVENOUS at 21:59

## 2017-07-10 RX ADMIN — PROPOFOL 75 MCG/KG/MIN: 10 INJECTION, EMULSION INTRAVENOUS at 13:06

## 2017-07-10 RX ADMIN — FAMOTIDINE 20 MG: 10 INJECTION INTRAVENOUS at 08:42

## 2017-07-10 RX ADMIN — PROPOFOL 75 MCG/KG/MIN: 10 INJECTION, EMULSION INTRAVENOUS at 21:35

## 2017-07-10 RX ADMIN — LORAZEPAM 4 MG: 2 INJECTION INTRAMUSCULAR at 08:42

## 2017-07-10 RX ADMIN — CLONIDINE 1 PATCH: 0.2 PATCH TRANSDERMAL at 10:30

## 2017-07-10 RX ADMIN — FUROSEMIDE 40 MG: 10 INJECTION, SOLUTION INTRAMUSCULAR; INTRAVENOUS at 21:59

## 2017-07-10 RX ADMIN — ALBUMIN HUMAN 25 G: 0.25 SOLUTION INTRAVENOUS at 21:59

## 2017-07-10 RX ADMIN — METRONIDAZOLE 500 MG: 500 INJECTION, SOLUTION INTRAVENOUS at 10:31

## 2017-07-10 RX ADMIN — LORAZEPAM 4 MG: 2 INJECTION INTRAMUSCULAR at 02:37

## 2017-07-10 RX ADMIN — LORAZEPAM 4 MG: 2 INJECTION INTRAMUSCULAR at 16:59

## 2017-07-10 RX ADMIN — METRONIDAZOLE 500 MG: 500 TABLET, FILM COATED ORAL at 17:40

## 2017-07-10 RX ADMIN — DEXAMETHASONE SODIUM PHOSPHATE 6 MG: 10 INJECTION, SOLUTION INTRAMUSCULAR; INTRAVENOUS at 10:30

## 2017-07-10 RX ADMIN — HYDROMORPHONE HYDROCHLORIDE 1 MG: 1 INJECTION, SOLUTION INTRAMUSCULAR; INTRAVENOUS; SUBCUTANEOUS at 03:35

## 2017-07-10 RX ADMIN — LEVETIRACETAM 1000 MG: 1000 INJECTION, SOLUTION INTRAVENOUS at 21:59

## 2017-07-10 RX ADMIN — DEXTROMETHORPHAN 30 MG: 30 SUSPENSION, EXTENDED RELEASE ORAL at 17:40

## 2017-07-10 RX ADMIN — FUROSEMIDE 40 MG: 10 INJECTION, SOLUTION INTRAMUSCULAR; INTRAVENOUS at 10:30

## 2017-07-10 RX ADMIN — PROPOFOL 75 MCG/KG/MIN: 10 INJECTION, EMULSION INTRAVENOUS at 15:50

## 2017-07-10 RX ADMIN — PROPOFOL 75 MCG/KG/MIN: 10 INJECTION, EMULSION INTRAVENOUS at 04:48

## 2017-07-10 RX ADMIN — ALBUMIN HUMAN 25 G: 0.25 SOLUTION INTRAVENOUS at 10:30

## 2017-07-10 RX ADMIN — DEXTROMETHORPHAN 30 MG: 30 SUSPENSION, EXTENDED RELEASE ORAL at 08:44

## 2017-07-10 RX ADMIN — NICARDIPINE HYDROCHLORIDE 5 MG/HR: 25 INJECTION INTRAVENOUS at 04:48

## 2017-07-10 RX ADMIN — DEXAMETHASONE SODIUM PHOSPHATE 6 MG: 10 INJECTION, SOLUTION INTRAMUSCULAR; INTRAVENOUS at 04:46

## 2017-07-10 RX ADMIN — HALOPERIDOL LACTATE 5 MG: 5 INJECTION, SOLUTION INTRAMUSCULAR at 08:42

## 2017-07-10 RX ADMIN — PROPOFOL 75 MCG/KG/MIN: 10 INJECTION, EMULSION INTRAVENOUS at 07:48

## 2017-07-10 RX ADMIN — CEFTRIAXONE 2 G: 2 INJECTION, POWDER, FOR SOLUTION INTRAMUSCULAR; INTRAVENOUS at 07:42

## 2017-07-10 RX ADMIN — PROPOFOL 75 MCG/KG/MIN: 10 INJECTION, EMULSION INTRAVENOUS at 10:40

## 2017-07-10 RX ADMIN — HYDROMORPHONE HYDROCHLORIDE 1 MG: 1 INJECTION, SOLUTION INTRAMUSCULAR; INTRAVENOUS; SUBCUTANEOUS at 00:30

## 2017-07-10 RX ADMIN — HALOPERIDOL LACTATE 5 MG: 5 INJECTION, SOLUTION INTRAMUSCULAR at 01:49

## 2017-07-10 RX ADMIN — CHLORHEXIDINE GLUCONATE 15 ML: 1.2 RINSE ORAL at 08:44

## 2017-07-10 RX ADMIN — ALBUTEROL SULFATE 2.5 MG: 2.5 SOLUTION RESPIRATORY (INHALATION) at 19:32

## 2017-07-10 RX ADMIN — METOPROLOL TARTRATE 50 MG: 50 TABLET ORAL at 21:59

## 2017-07-10 RX ADMIN — LORAZEPAM 4 MG: 2 INJECTION INTRAMUSCULAR at 13:54

## 2017-07-10 RX ADMIN — HYDROMORPHONE HYDROCHLORIDE 1 MG: 1 INJECTION, SOLUTION INTRAMUSCULAR; INTRAVENOUS; SUBCUTANEOUS at 13:54

## 2017-07-10 RX ADMIN — METOPROLOL TARTRATE 50 MG: 50 TABLET ORAL at 08:40

## 2017-07-10 RX ADMIN — PROPOFOL 70 MCG/KG/MIN: 10 INJECTION, EMULSION INTRAVENOUS at 02:12

## 2017-07-10 RX ADMIN — FAMOTIDINE 20 MG: 20 TABLET, FILM COATED ORAL at 21:59

## 2017-07-10 RX ADMIN — CHLORHEXIDINE GLUCONATE 15 ML: 1.2 RINSE ORAL at 21:59

## 2017-07-10 RX ADMIN — ENOXAPARIN SODIUM 40 MG: 40 INJECTION SUBCUTANEOUS at 08:40

## 2017-07-10 RX ADMIN — DEXAMETHASONE SODIUM PHOSPHATE 6 MG: 10 INJECTION, SOLUTION INTRAMUSCULAR; INTRAVENOUS at 17:41

## 2017-07-10 RX ADMIN — AMLODIPINE BESYLATE 10 MG: 10 TABLET ORAL at 08:40

## 2017-07-10 RX ADMIN — ALBUTEROL SULFATE 2.5 MG: 2.5 SOLUTION RESPIRATORY (INHALATION) at 12:08

## 2017-07-10 RX ADMIN — ALBUTEROL SULFATE 2.5 MG: 2.5 SOLUTION RESPIRATORY (INHALATION) at 06:33

## 2017-07-10 RX ADMIN — METRONIDAZOLE 500 MG: 500 INJECTION, SOLUTION INTRAVENOUS at 01:51

## 2017-07-10 NOTE — PLAN OF CARE
Problem: Patient Care Overview (Adult)  Goal: Plan of Care Review  Outcome: Ongoing (interventions implemented as appropriate)    07/10/17 0810   Coping/Psychosocial Response Interventions   Plan Of Care Reviewed With patient   Outcome Evaluation   Outcome Summary/Follow up Plan PT eval completed. Pt. on the vent, sedation on and eyes closed. Pt. did not follow any active commands at this time. Pt. did resist PROM to L UE. PT completed PROM B UE/LE. PT will work to progres AROM/strengthening and will work to maintain joint/skin integrity while on the vent. PT will re-eval to update POC/goals once pt. extubated.          Problem: Inpatient Physical Therapy  Goal: Bed Mobility Goal LTG- PT  Outcome: Ongoing (interventions implemented as appropriate)    07/10/17 0810   Bed Mobility PT LTG   Bed Mobility PT LTG, Date Established 07/10/17   Bed Mobility PT LTG, Time to Achieve by discharge   Bed Mobility PT LTG, Activity Type roll left/roll right   Bed Mobility PT LTG, [place] Level moderate assist (50% patient effort)   Bed Mobility PT Goal LTG, Assist Device bed rails       Goal: Strength Goal LTG- PT  Outcome: Ongoing (interventions implemented as appropriate)    07/10/17 0810   Strength Goal PT LTG   Strength Goal PT LTG, Date Established 07/10/17   Strength Goal PT LTG, Time to Achieve by discharge   Strength Goal PT LTG, Measure to Achieve AAROM/AROM, B UE/LE, 10-20 reps, min assist       Goal: Physical Therapy Goal LTG- PT  Outcome: Ongoing (interventions implemented as appropriate)    07/10/17 0810   Physical Therapy PT LTG   Physical Therapy PT LTG, Date Established 07/10/17   Physical Therapy PT LTG, Time to Achieve by discharge   Physical Therapy PT LTG, Activity Type No new evidence of skin breakdown or contractures while pt. on the vent.

## 2017-07-10 NOTE — PROGRESS NOTES
"Pharmacokinetic Follow-up Note - Vancomycin    Nishant Savage is a 52 y.o. male  [Ht: 67\" (170.2 cm); Wt: 234 lb 6.4 oz (106 kg)]    Estimated Creatinine Clearance: 103.6 mL/min (by C-G formula based on Cr of 0.98).   Lab Results   Component Value Date    CREATININE 0.98 07/10/2017    CREATININE 0.91 07/09/2017    CREATININE 0.98 07/08/2017      Lab Results   Component Value Date    WBC 12.30 (H) 07/08/2017    WBC 9.27 07/06/2017    WBC 15.95 (H) 07/05/2017      Lab Results   Component Value Date    VANCOTROUGH 12.99 07/10/2017       Current Vancomycin Dose:  1250 mg IVPB every 12 hours, day 3 of therapy.    Indication for use: possible CNS infection; resp cultures growing S. aureus    Assessment/Plan:  7/10 0830 Vancomycin trough = 12.99 (11.5 hours post 1250mg dose on 7/09 at 2105); continue Vancomycin 1250mg iv q12h  Pharmacy will continue to  monitor renal function and adjust dose accordingly.    Reilly Cuevas [facility]   07/10/17 11:12 AM    "

## 2017-07-10 NOTE — PROGRESS NOTES
PULMONARY AND CRITICAL CARE PROGRESS NOTE - [facility]    Patient: Nishant Savage    1965    MR# [id number]    Acct# [id number]  07/10/17   8:07 AM  Referring Provider: Cristino Encinas DO    Chief Complaint: Mechanically ventilated    Interval history: Remains on ventilator with propofol gtt infusing.  Cardene is currently on.  He will open his eyes and follow some commands despite propofol.  Abdomen remains distended, but is now firmer than it has been in previous assessments, will obtain KUB.  Large amount of oral secretions.  Tongue is still bruised and edematous protruding form mouth.  Currently FiO2 40% with O2 sat 94%.  FiO2 has been increased since yesterday 2' AM ABGs with PO2 55.  No other aggravating or allevitating factors.       Meds:    albuterol 2.5 mg Nebulization Q6H - RT   amLODIPine 10 mg Per G Tube Q24H   ceftriaxone 2 g Intravenous Q12H   chlorhexidine 15 mL Mouth/Throat Q12H   dexamethasone 6 mg Intravenous Q6H   dextromethorphan polistirex ER 30 mg Oral BID   enoxaparin 40 mg Subcutaneous Daily   famotidine 20 mg Intravenous Q12H   furosemide 40 mg Intravenous Once   levETIRAcetam 1,000 mg Intravenous Q12H   metoprolol tartrate 50 mg Nasogastric Q12H   metroNIDAZOLE 500 mg Intravenous Q8H   thiamine (VITAMIN B1) IVPB 100 mg Intravenous Daily   vancomycin 1,250 mg Intravenous Q12H       niCARdipine 5-15 mg/hr Last Rate: 5 mg/hr (07/10/17 0448)   Pharmacy to dose vancomycin     propofol 5-50 mcg/kg/min Last Rate: 75 mcg/kg/min (07/10/17 0714)     Review of Systems:   Cannot obtain due to mechanical ventilation.  The patient notably is critically ill and connected to a ventilator.  As such patient cannot communicate and provide any history whatsoever, including any history of present illness or interval history since arrival or review of systems. The interested reviewer may note this fact, as an attempt has been made at collecting and documenting these portions of the  patient history, but this information is unobtainable despite attempted review and therefore cannot be documented at this time.     Ventilator Settings:  Vent Mode: VC/AC  Vt (Set, L): 0.65 L  Resp Rate (Set): 10  Pressure Support (cm H2O): 0 cm H20  FiO2 (%): 40 %  PEEP/CPAP (cm H2O): 5 cm H20  Minute Ventilation (L/min) (Obs): 12.6 L/min  Resp Rate (Observed) Vent: 25  I:E Ratio (Set): 1:1.10  I:E Ratio (Obs): 1:1.4  PIP Observed (cm H2O): 35 cm H2O  RSBI: 202    Physical Exam:  Temp:  [98.2 °F (36.8 °C)-100.7 °F (38.2 °C)] 98.7 °F (37.1 °C)  Heart Rate:  [] 98  Resp:  [10-24] 17  BP: (127-182)/() 157/97  FiO2 (%):  [30 %-40 %] 40 %    Intake/Output Summary (Last 24 hours) at 07/10/17 0807  Last data filed at 07/10/17 0800   Gross per 24 hour   Intake           4284.6 ml   Output             3355 ml   Net            929.6 ml     SpO2 Readings from Last 3 Encounters:   07/10/17 91%   09/30/16 98%   02/04/16 96%     Physical Exam   Gen.: Sedated and intubated on mechanical ventilatory support.  HEENT: Endotracheal tube is in place. Tongue is swollen and bruised. Oral secretions.   Neck: Supple.  Respiratory: Fair air movement bilaterally, coarse rales bilaterally.   Cardiac: Sinus rhythm. No murmur or gallop noted.   Abdomen: firmer than previous assessments, distended, bowel sounds present.   Neurologic: He has been opening eyes and following commands per nursing.   Psychiatric: Cannot assess as he is sedated and intubated.  Dermatologic: No rash noted.  Extremities: +generalized edema, SCDs are in place.  Musculoskeletal: No joint deformities noted.  Lymphatic: No adenopathy palpated    Results from last 7 days  Lab Units 07/08/17  1057 07/06/17  0222 07/05/17  0158   WBC 10*3/mm3 12.30* 9.27 15.95*   HEMOGLOBIN g/dL 10.4* 12.1* 12.8*   PLATELETS 10*3/mm3 199 216 246       Results from last 7 days  Lab Units 07/10/17  0338 07/09/17  0348 07/08/17  0411   SODIUM mmol/L 144 143 143   POTASSIUM mmol/L 4.1  3.7 3.5   BUN mg/dL 13 11 11   CREATININE mg/dL 0.98 0.91 0.98       Results from last 7 days  Lab Units 07/10/17  0255 07/09/17  0159 07/08/17  0214   PH, ARTERIAL pH units 7.443 7.417 7.422   PCO2, ARTERIAL mm Hg 32.9* 36.3 35.8   PO2 ART mm Hg 55.9* 80.8 71.1*   FIO2 % 30 30 30        Recent films:  Imaging Results (last 24 hours)     Procedure Component Value Units Date/Time    XR Chest 1 View [[id number]] Collected:  07/10/17 0708     Updated:  07/10/17 0712    Narrative:       EXAMINATION:   XR CHEST 1 VW-  7/10/2017 7:08 AM CDT     HISTORY: Intubated     Single view the chest obtained. Left lung is clear. Cardiac silhouettes  mildly enlarged unchanged from July 9. Left subclavian catheter is  present. Nasogastric tubes present. Mild atelectasis right lung base is  present.       Impression:       Atelectasis right lung base persists not significantly  changed from July 9.        2. Mild-to-moderate cardiomegaly also unchanged  This report was finalized on 07/10/2017 07:09 by Dr. Adolph Echols MD.        Films reviewed personally by me.  My interpretation: CXR still with R lung atelectasis     Pulmonary Assessment:  1. Respiratory failure related to agitation from ethanol withdrawal.  2. Malignant HTN   3. PRES   4. Recent problems with angioedema which have resolved  5. History of chronic ethanol use  6. C-diff   7. Tongue hematoma  8. MRSA respiratory culture      Recommend:   · Continue current mechanical ventilation.  Not candidate for weaning trials 2' to neuro status, hypertension, and tongue swelling.  FiO2 already increased to 40%.     · KUB today   · Patient is edematous, with course rales, and net I/O +9L, consider diuretic and also consider changing cardene to cleviprex for less volume.       Electronically signed by EPI Manzo on 7/10/2017 at 8:07 AM    Physician substantive contribution:  Pertinent symptoms/interval history include: Patient remains intubated on mechanical  ventilatory support.  Respiratory exam shows pertinent findings of minimal basilar rhonchi on exam.  Plan includes: If aggressive treatment is to be continued I think he will need tracheostomy.  I have seen and examined patient personally, performing a face-to-face diagnostic evaluation with plan of care reviewed and developed with APRN and nursing staff. I have addended and/or modified the above history of present illness, physical examination, and assessment and plan to reflect my findings and impressions. Essential elements of the care plan were discussed with APRN above.  Agree with findings and assessment/plan as documented above.    Electronically signed by Indra Gallo MD, on 7/10/2017, 11:56 AM

## 2017-07-10 NOTE — PROGRESS NOTES
Continued Stay Note   Сергей     Patient Name: Nishant Savage  MRN: [id number]  Today's Date: 7/10/2017    Admit Date: 7/3/2017          Discharge Plan       07/10/17 1114    Case Management/Social Work Plan    Plan LTAC?    Additional Comments Patient remains in ICU on vent.  Patient's payor source is now approved.  Could look at LTAC as an option when medically appropriate.  Will follow.              Discharge Codes     None        Expected Discharge Date and Time     Expected Discharge Date Expected Discharge Time    Jul 7, 2017             HENRIETTA Nava

## 2017-07-10 NOTE — PROGRESS NOTES
Automatic IV to PO Conversion Pharmacy Note - General    Nishant Savage is a 52 y.o. male who meets the following criteria for IV to PO therapy conversion :     · Tolerating oral fluids or 40ml/hour of enteral nutrition and oral route not otherwise compromised  · Receiving other oral medications on a scheduled basis    Assessment/Plan  Based on this criteria, Famotidine 20 mg IV Q12H has been changed to Famotidine 20 mg G-tube Q12H per the directives and guidelines established by [facility] Pharmacy and Therapeutics Committee and [facility] Medical Executive Committee .       Reilly Cuevas [facility]  07/10/1711:31 AM

## 2017-07-10 NOTE — PLAN OF CARE
Problem: Patient Care Overview (Adult)  Goal: Plan of Care Review  Outcome: Ongoing (interventions implemented as appropriate)    07/10/17 0441   Coping/Psychosocial Response Interventions   Plan Of Care Reviewed With patient   Patient Care Overview   Progress no change   Outcome Evaluation   Outcome Summary/Follow up Plan Pt on and off cardene throughout the night. Pt was more restless/agitated on the vent. on 75 of propofol . PRN ativan, dilaudid, and haldol given. Pt continues to follow commands. With sedation pauses pts BP siginificanly increases. Pt continues to have copious amounts of oral /ETT secreations. The secreations out of the ETT are blood tinged        Goal: Adult Individualization and Mutuality  Outcome: Ongoing (interventions implemented as appropriate)  Goal: Discharge Needs Assessment  Outcome: Ongoing (interventions implemented as appropriate)    Problem: Seizure Disorder/Epilepsy (Adult)  Goal: Signs and Symptoms of Listed Potential Problems Will be Absent or Manageable (Seizure Disorder/Epilepsy)  Outcome: Ongoing (interventions implemented as appropriate)    Problem: Fall Risk (Adult)  Goal: Absence of Falls  Outcome: Ongoing (interventions implemented as appropriate)    Problem: Pressure Ulcer Risk (Roman Scale) (Adult,Obstetrics,Pediatric)  Goal: Identify Related Risk Factors and Signs and Symptoms  Outcome: Ongoing (interventions implemented as appropriate)  Goal: Skin Integrity  Outcome: Ongoing (interventions implemented as appropriate)    Problem: SAFETY - NON-VIOLENT RESTRAINT  Goal: Remains free of injury from restraints (Non-Violent Restraint)  Outcome: Ongoing (interventions implemented as appropriate)  Goal: Free from restraint(s) (Non-Violent Restraint)  Outcome: Ongoing (interventions implemented as appropriate)    Problem: Nutrition, Enteral (Adult)  Goal: Signs and Symptoms of Listed Potential Problems Will be Absent or Manageable (Nutrition, Enteral)  Outcome: Ongoing  (interventions implemented as appropriate)

## 2017-07-10 NOTE — PLAN OF CARE
Problem: Patient Care Overview (Adult)  Goal: Plan of Care Review  Outcome: Ongoing (interventions implemented as appropriate)    07/10/17 2707   Coping/Psychosocial Response Interventions   Plan Of Care Reviewed With family   Outcome Evaluation   Outcome Summary/Follow up Plan lasix and albumin given today to balance fluid. patient remains sedated. prn ativan and dilaudid given. trach planned for later in week if angioedema does not resolve.         Problem: Seizure Disorder/Epilepsy (Adult)  Goal: Signs and Symptoms of Listed Potential Problems Will be Absent or Manageable (Seizure Disorder/Epilepsy)  Outcome: Ongoing (interventions implemented as appropriate)    Problem: Fall Risk (Adult)  Goal: Absence of Falls  Outcome: Ongoing (interventions implemented as appropriate)    Problem: Pressure Ulcer Risk (Roman Scale) (Adult,Obstetrics,Pediatric)  Goal: Identify Related Risk Factors and Signs and Symptoms  Outcome: Ongoing (interventions implemented as appropriate)    Problem: SAFETY - NON-VIOLENT RESTRAINT  Goal: Remains free of injury from restraints (Non-Violent Restraint)  Outcome: Ongoing (interventions implemented as appropriate)  Goal: Free from restraint(s) (Non-Violent Restraint)  Outcome: Ongoing (interventions implemented as appropriate)    Problem: Nutrition, Enteral (Adult)  Goal: Signs and Symptoms of Listed Potential Problems Will be Absent or Manageable (Nutrition, Enteral)  Outcome: Ongoing (interventions implemented as appropriate)

## 2017-07-10 NOTE — PROGRESS NOTES
Neurology Progress Note      Chief Complaint:  Seizure, Malignant HTN, PRES, EtOH w/d, Respiratory failure on Vent    Subjective     Subjective:    He is neurologically improving according to the nursing staff.  Overnight he was able to squeeze hands to command bilaterally.  This morning with the propofol momentarily positivity is able to re-create this.  He does have some new issues of anasarca and abdominal distention.    Medications:  Current Facility-Administered Medications   Medication Dose Route Frequency Provider Last Rate Last Dose   • acetaminophen (TYLENOL) tablet 650 mg  650 mg Oral Q4H PRN Jairon Perez, DO   650 mg at 07/09/17 1003   • albuterol (PROVENTIL) nebulizer solution 0.083% 2.5 mg/3mL  2.5 mg Nebulization Q6H - RT Jairon Perez, DO   2.5 mg at 07/10/17 0633   • amLODIPine (NORVASC) tablet 10 mg  10 mg Per G Tube Q24H Cristino Encinas, DO   10 mg at 07/10/17 0840   • cefTRIAXone (ROCEPHIN) 2 g/100 mL 0.9% NS VTB (ALEX)  2 g Intravenous Q12H Cristino Encinas, DO 0 mL/hr at 07/09/17 0800 2 g at 07/10/17 0742   • chlorhexidine (PERIDEX) 0.12 % solution 15 mL  15 mL Mouth/Throat Q12H Cristino Encinas, DO   15 mL at 07/10/17 0844   • dexamethasone sodium phosphate 6 mg in sodium chloride 0.9 % IVPB  6 mg Intravenous Q6H Cristino Encinas, DO 0 mL/hr at 07/09/17 1245 6 mg at 07/10/17 0446   • dextromethorphan polistirex ER (DELSYM) 30 MG/5ML oral suspension 30 mg  30 mg Oral BID Robe Dorsey, DO   30 mg at 07/10/17 0844   • enoxaparin (LOVENOX) syringe 40 mg  40 mg Subcutaneous Daily Cristino Encinas, DO   40 mg at 07/10/17 0840   • famotidine (PEPCID) injection 20 mg  20 mg Intravenous Q12H Jairon Perez, DO   20 mg at 07/10/17 0842   • haloperidol lactate (HALDOL) injection 5 mg  5 mg Intravenous Q6H PRN Cristino Encinas DO   5 mg at 07/10/17 0842   • HYDROcodone-acetaminophen (NORCO)  MG per tablet 1 tablet  1 tablet Oral Q4H PRN Jairon Perez DO   1 tablet at 07/03/17  1758   • HYDROcodone-acetaminophen (NORCO) 5-325 MG per tablet 1 tablet  1 tablet Oral Q4H PRN Jairon Perez, DO       • HYDROmorphone (DILAUDID) injection 1 mg  1 mg Intravenous Q3H PRN Jairon Perez, DO   1 mg at 07/10/17 0335    And   • naloxone (NARCAN) injection 0.4 mg  0.4 mg Intravenous Q5 Min PRN Jairon Perez DO       • ibuprofen (ADVIL,MOTRIN) 100 MG/5ML suspension 200 mg  200 mg Oral Q6H PRN Cristino Encinas, DO   200 mg at 07/09/17 1342   • levETIRAcetam in NaCl 0.75% (KEPPRA) IVPB 1,000 mg  1,000 mg Intravenous Q12H Royal Campos MD 0 mL/hr at 07/09/17 0930 1,000 mg at 07/10/17 0842   • LORazepam (ATIVAN) tablet 1 mg  1 mg Oral Q2H PRN Cristino Encinas DO        Or   • LORazepam (ATIVAN) injection 1 mg  1 mg Intravenous Q2H PRN Cristino Encinas DO        Or   • LORazepam (ATIVAN) tablet 2 mg  2 mg Oral Q1H PRN Cristino Encinas DO        Or   • LORazepam (ATIVAN) injection 2 mg  2 mg Intravenous Q1H PRN Cristino Encinas DO   2 mg at 07/09/17 0336    Or   • LORazepam (ATIVAN) injection 2 mg  2 mg Intravenous Q15 Min PRN Cristino Encinas DO        Or   • LORazepam (ATIVAN) injection 2 mg  2 mg Intramuscular Q15 Min PRN Cristino Encinas DO        Or   • LORazepam (ATIVAN) tablet 4 mg  4 mg Oral Q1H PRN Cristino Encinas DO        Or   • LORazepam (ATIVAN) injection 4 mg  4 mg Intravenous Q1H PRN Cristino Encinas DO   4 mg at 07/10/17 0842   • metoprolol tartrate (LOPRESSOR) tablet 50 mg  50 mg Nasogastric Q12H Jairon Perez, DO   50 mg at 07/10/17 0840   • metroNIDAZOLE (FLAGYL) IVPB 500 mg  500 mg Intravenous Q8H Robe Dorsey, DO 0 mL/hr at 07/09/17 1158 500 mg at 07/10/17 0151   • niCARdipine (CARDENE) 25 mg/250 mL (0.1 mg/mL) 0.9% NS infusion  5-15 mg/hr Intravenous Titrated Jairon Perez DO 50 mL/hr at 07/10/17 0448 5 mg/hr at 07/10/17 0448   • ondansetron (ZOFRAN) injection 4 mg  4 mg Intravenous Q6H PRN Jairon Perez DO   4 mg at 07/09/17 1715   • Pharmacy  to dose vancomycin   Does not apply Continuous PRN Cristino Encinas, DO       • propofol (DIPRIVAN) infusion 10 mg/mL 100 mL  5-50 mcg/kg/min Intravenous Titrated Jairon Perez DO 37.3 mL/hr at 07/10/17 0748 75 mcg/kg/min at 07/10/17 0748   • sodium chloride 0.9 % flush 1-10 mL  1-10 mL Intravenous PRN Jairon Perez DO       • thiamine (B-1) 100 mg in sodium chloride 0.9 % 100 mL IVPB  100 mg Intravenous Daily Jairon Perez  mL/hr at 07/10/17 0841 100 mg at 07/10/17 0841   • vancomycin 1250 mg/250 mL 0.9% NS IVPB ([facility])  1,250 mg Intravenous Q12H Cristino Encinas, DO 0 mL/hr at 07/09/17 1000 1,250 mg at 07/10/17 0843       Review of Systems:   Could not obtain      Objective      Vital Signs  Temp:  [98.2 °F (36.8 °C)-100.7 °F (38.2 °C)] 98.7 °F (37.1 °C)  Heart Rate:  [] 98  Resp:  [10-24] 17  BP: (127-175)/() 157/97  FiO2 (%):  [30 %-40 %] 40 %    Physical Exam:  Heavily sedated  --We will squeeze hands to command left greater than right  NC/AT, tongue remains swollen  Sclera anicteric  Coarse BS bilaterally  RRR  Abdomen distended  3+ edema in feet and hands    Neuro:  Awakens to loud voice and pain.  Opens eyes  --Squeezes hand and wiggles toes  --Will track some with eyes  --PERRL  --Positive Blink  --Moving all extremities, more so off sedation  --No w/d to pain currently  --DTRs absent throughout, equivocal Bab[place]i     Results Review:    I reviewed the patient's new clinical results.      Results from last 7 days  Lab Units 07/08/17  1057 07/06/17  0222 07/05/17  0158   WBC 10*3/mm3 12.30* 9.27 15.95*   HEMOGLOBIN g/dL 10.4* 12.1* 12.8*   HEMATOCRIT % 31.5* 35.4* 38.1*   PLATELETS 10*3/mm3 199 216 246          Results from last 7 days  Lab Units 07/10/17  0338 07/09/17  0348 07/08/17  0411   SODIUM mmol/L 144 143 143   POTASSIUM mmol/L 4.1 3.7 3.5   CHLORIDE mmol/L 111* 111* 111*   CO2 mmol/L 24.0 22.0* 21.0*   BUN mg/dL 13 11 11   CREATININE mg/dL 0.98 0.91 0.98   CALCIUM mg/dL  8.6 8.0* 8.0*   BILIRUBIN mg/dL 0.5  --   --    ALK PHOS U/L 155*  --   --    ALT (SGPT) U/L 42  --   --    AST (SGOT) U/L 51*  --   --    GLUCOSE mg/dL 182* 108* 131*        Lab Results   Component Value Date    MG 2.1 07/06/2017     No components found for: POCGLUC  No components found for: A1C  Lab Results   Component Value Date    HDL 36 02/04/2016     No components found for: B12  No results found for: TSH     Labs reviewed    Assessment/Plan     Hospital Problem List    Active Problems:    Angio-edema    Seizures    Impression  52 year old admitted with seizures, malignant HTN, PRES, EtOH w/d. Agitation, intubated, sedated, C.diff, low grade temps      Plan  1. PRES  --BPS generally are more stable.  Off Cardene currently but use as needed to maintain more normalized BPS, DBP < 100  --No overt seizure activity reported  --EEG showed no evidence of status. --On Keppra to 1000mg IV BID.  Will remain on this until MRI normal, recheck in 6 weeks  --Attempted LP yesterday for fevers and continue AMS.  After 2 passes stopped as patient on Lovenox.  Started empiric coverage for now although doubt CNS infection  --Neuro exam stable and improving      2. DTs  --On Ativan, Propofol  --Intubated  --On Thiamine, FA  --Wean as tolerated     3. C.diff  --likely source of fevers  --On Flagyl      4. Tongue swelling: Appears about the same. Intubated currently    5.  GI:  Jevitiy 1.2 at 50/hr TF    6.  Resp:  Intubated, on Vent      More optimistic this morning with exam.   Will follow with serial exams.      Harpreet Arroyo MD  07/10/17  9:35 AM

## 2017-07-10 NOTE — THERAPY EVALUATION
"Acute Care - Physical Therapy Initial Evaluation  [facility]     Patient Name: Nishant Savage  : 1965  MRN: [id number]  Today's Date: 7/10/2017   Onset of Illness/Injury or Date of Surgery Date: 17  Date of Referral to PT: 17  Referring Physician: Dr. Encinas      Admit Date: 7/3/2017     Visit Dx:    ICD-10-CM ICD-9-CM   1. Oropharyngeal dysphagia R13.12 787.22   2. Seizures R56.9 780.39   3. Angioedema, sequela T78.3XXS 909.9   4. Alcohol withdrawal, with delirium F10.231 291.0   5. Impaired mobility Z74.09 799.89     Patient Active Problem List   Diagnosis   • Hyperlipidemia   • HTN (hypertension)   • Gout   • Anxiety   • Arthritis   • Erectile dysfunction   • Angio-edema   • Seizures     Past Medical History:   Diagnosis Date   • Anxiety    • Arthritis    • Gout    • HTN (hypertension)    • Hyperlipidemia      History reviewed. No pertinent surgical history.       PT ASSESSMENT (last 72 hours)      PT Evaluation       07/10/17 0810 17    Rehab Evaluation    Document Type evaluation   See MAR  -MARJORIE     Subjective Information unable to respond   on vent  -MARJORIE     General Information    Patient Profile Review yes  -MARJORIE     Onset of Illness/Injury or Date of Surgery Date 17  -MARJORIE     Referring Physician Dr. Encinas  -MARJORIE     General Observations Fowlers in bed, on vent  -MARJORIE     Pertinent History Of Current Problem Tongue swelling, \"diffuse shaking throughout his body.\" Dx: Possible angioedema related to lisinopril, malignant HTN, possible new onset seizure vs. convulsive syncope, post. reversible encephalopathy syndrome, tongue bite with sublingual hematoma, Hx ETOH use; DT's. Intubated , resp. failure related to agitation from ETOH withdrawal, c-diff.  central line placement,  lumbar puncture, 7/10 cxr,  neck CT,  head CT,  L UE venous scan: thrombus in L basilic vein above the elbow,  L UE arterial doppler; negative. 7/3 R shoulder,  KUB, 7/3 brain MRI: " Post. reversible encephalopathy syndrome.   -MARJORIE     Precautions/Limitations fall precautions;oxygen therapy device and L/min   Vent, c-diff, rectal tube, B UE restraints  -MARJORIE     Prior Level of Function --   unknown, on the vent, no family present  -MARJORIE     Equipment Currently Used at Home --   unknown, on the vent, no family present  -MARJORIE     Plans/Goals Discussed With patient  -MARJORIE     Risks Reviewed patient:;LOB;nausea/vomiting;dizziness;increased discomfort;change in vital signs;increased drainage;lines disloged  -MARJORIE     Benefits Reviewed patient:;improve function;increase independence;increase strength;increase balance;decrease pain;increase knowledge;improve skin integrity  -MARJORIE     Barriers to Rehab medically complex  -MARJORIE     Living Environment    Lives With --   unknown, on the vent, no family present  -MARJORIE     Clinical Impression    Date of Referral to PT 07/06/17  -MARJORIE     PT Diagnosis Impaired mobility, weakness  -MARJORIE     Functional Level At Time Of Evaluation PROM B UE/LE, on vent  -MARJORIE     Patient/Family Goals Statement PT goal to increase strength/mobility  -MARJORIE     Criteria for Skilled Therapeutic Interventions Met yes;treatment indicated  -MARJORIE     Impairments Found (describe specific impairments) arousal, attention, and cognition;gait, locomotion, and balance  -MARJORIE     Rehab Potential fair, will monitor progress closely  -MARJORIE     Predicted Duration of Therapy Intervention (days/wks) until d/c  -MARJORIE     Vital Signs    Post Systolic BP Rehab 159  -MARJORIE     Post Treatment Diastolic   -MARJORIE     Posttreatment Heart Rate (beats/min) 98  -MARJORIE     Post SpO2 (%) 95  -MARJORIE     O2 Delivery Post Treatment supplemental O2   vent  -MARJORIE     Pre Patient Position Supine  -MARJORIE     Intra Patient Position Supine  -MARJORIE     Post Patient Position Supine  -MARJORIE     Pain Assessment    Pain Assessment Rivera-Narvaez FACES  -MARJORIE     Rivera-Narvaez FACES Pain Rating 0  -MARJORIE     Vision Assessment/Intervention    Visual Impairment unable/difficult to assess   -MARJORIE     Cognitive Assessment/Intervention    Current Cognitive/Communication Assessment impaired  -MARJORIE     Orientation Status unable/difficult to assess   sedation on  -MARJORIE     Follows Commands/Answers Questions unable/difficult to assess   Not following active commands at this time, eyes closed  -MARJORIE     Personal Safety unable/difficult to assess  -MARJORIE     Personal Safety Interventions fall prevention program maintained;muscle strengthening facilitated;nonskid shoes/slippers when out of bed  -MARJORIE     ROM (Range of Motion)    General ROM Detail PROM WFL R UE, B LE. L UE PROM impaired ~ 50% (pt. resisted ROM).  -MARJORIE     MMT (Manual Muscle Testing)    General MMT Assessment Detail No active purposeful movement observed this AM.  -MARJORIE     Muscle Tone Assessment    Muscle Tone Assessment  --  -RM    Bilateral Upper Extremities Muscle Tone Assessment  --  -RM    Bilateral Lower Extremities Muscle Tone Assessment  --  -RM    Bed Mobility, Assessment/Treatment    Bed Mobility, Comment Deferred, not following commands, sedation on.  -MARJORIE     Therapy Exercises    Bilateral Lower Extremities PROM:;10 reps;supine;ankle pumps/circles;heel slides;hip abduction/adduction  -MARJORIE     Bilateral Upper Extremity PROM:;10 reps;supine;hand pumps;elbow flexion/extension;shoulder extension/flexion;shoulder horizontal abd/add  -MARJORIE     Edema Management    Edema Amount --   B hand swelling  -MARJORIE     Positioning and Restraints    Pre-Treatment Position in bed  -MARJORIE     Post Treatment Position bed  -MARJORIE     In Bed fowlers;call light within reach;encouraged to call for assist;patient within staff view;with family/caregiver;with nsg;RUE elevated;LUE elevated;RLE elevated;LLE elevated;SCD pump applied  -MARJORIE     Restraints released:;reapplied:;soft limb   B UE  -MARJORIE       07/09/17 1632 07/09/17 1600    General Information    Equipment Currently Used at Home none  -AF     Living Environment    Transportation Available car;family or friend will provide  -AF      Muscle Tone Assessment    Muscle Tone Assessment  Bilateral Upper Extremities;Bilateral Lower Extremities  -AF    Bilateral Upper Extremities Muscle Tone Assessment  flaccid  -AF    Bilateral Lower Extremities Muscle Tone Assessment  flaccid  -AF      User Key  (r) = Recorded By, (t) = Taken By, (c) = Cosigned By    Initials Name Provider Type    MARJORIE Olsen, PT DPT Physical Therapist    AVIVA Persaud Registered Nurse    MICH Cao RN Registered Nurse          Physical Therapy Education     Title: PT OT SLP Therapies (Active)     Topic: Physical Therapy (Active)     Point: Home exercise program (Active)    Learning Progress Summary    Learner Readiness Method Response Comment Documented by Status   Patient Acceptance E NR Educated pt on progression of PT POC and benefits of activity MARJORIE 07/10/17 0810 Active                      User Key     Initials Effective Dates Name Provider Type Discipline    MARJORIE 08/02/16 -  Jaime Olsen, PT DPT Physical Therapist PT                PT Recommendation and Plan  Anticipated Discharge Disposition:  (unknown while on the vent)  Planned Therapy Interventions: (S) bed mobility training, balance training, home exercise program, patient/family education, ROM (Range of Motion), strengthening (PT will re-eval to update POC/goals once extubated. )  PT Frequency: daily, 2 times/day  Plan of Care Review  Plan Of Care Reviewed With: patient  Outcome Summary/Follow up Plan: PT eval completed. Pt. on the vent, sedation on and eyes closed. Pt. did not follow any active commands at this time. Pt. did resist PROM to L UE. PT completed PROM B UE/LE. PT will work to progres AROM/strengthening and will work to maintain joint/skin integrity while on the vent. PT will re-eval to update POC/goals once pt. extubated.           IP PT Goals       07/10/17 0810          Bed Mobility PT LTG    Bed Mobility PT LTG, Date Established 07/10/17  -MARJORIE      Bed Mobility PT LTG, Time  to Achieve by discharge  -MARJORIE      Bed Mobility PT LTG, Activity Type roll left/roll right  -MARJORIE      Bed Mobility PT LTG, [place] Level moderate assist (50% patient effort)  -MARJORIE      Bed Mobility PT Goal  LTG, Assist Device bed rails  -MARJORIE      Strength Goal PT LTG    Strength Goal PT LTG, Date Established 07/10/17  -MARJORIE      Strength Goal PT LTG, Time to Achieve by discharge  -MARJORIE      Strength Goal PT LTG, Measure to Achieve AAROM/AROM, B UE/LE, 10-20 reps, min assist  -MARJORIE      Physical Therapy PT LTG    Physical Therapy PT LTG, Date Established 07/10/17  -MARJORIE      Physical Therapy PT LTG, Time to Achieve by discharge  -MARJORIE      Physical Therapy PT LTG, Activity Type No new evidence of skin breakdown or contractures while pt. on the vent.   -MARJORIE        User Key  (r) = Recorded By, (t) = Taken By, (c) = Cosigned By    Initials Name Provider Type    MARJORIE Olsen, PT DPT Physical Therapist                Outcome Measures       07/10/17 0810          How much help from another person do you currently need...    Turning from your back to your side while in flat bed without using bedrails? 1  -MARJORIE      Moving from lying on back to sitting on the side of a flat bed without bedrails? 1  -MARJORIE      Moving to and from a bed to a chair (including a wheelchair)? 1  -MARJORIE      Standing up from a chair using your arms (e.g., wheelchair, bedside chair)? 1  -MARJORIE      Climbing 3-5 steps with a railing? 1  -MARJORIE      To walk in hospital room? 1  -MARJORIE      AM-PAC 6 Clicks Score 6  -MARJORIE      Functional Assessment    Outcome Measure Options AM-PAC 6 Clicks Basic Mobility (PT)  -MARJORIE        User Key  (r) = Recorded By, (t) = Taken By, (c) = Cosigned By    Initials Name Provider Type    MARJORIE Olsen, PT DPT Physical Therapist           Time Calculation:         PT Charges       07/10/17 1009          Time Calculation    Start Time 0810  -MARJORIE      Stop Time 0915  -MARJORIE      Time Calculation (min) 65 min  -MARJORIE      PT Received On 07/10/17   -MARJORIE      PT Goal Re-Cert Due Date 07/20/17  -MARJORIE        User Key  (r) = Recorded By, (t) = Taken By, (c) = Cosigned By    Initials Name Provider Type    MARJORIE Olsen, PT DPT Physical Therapist          Therapy Charges for Today     Code Description Service Date Service Provider Modifiers Qty    70715038452  PT MOBILITY CURRENT 7/10/2017 Jaime Olsen, PT DPT GP, CN 1    [id number] HC PT MOBILITY PROJECTED 7/10/2017 Jaime Olsen, PT DPT GP, CM 1    [id number] HC PT EVAL MOD COMPLEXITY 4 7/10/2017 Jaime Olsen, PT DPT GP 1          PT G-Codes  Outcome Measure Options: AM-PAC 6 Clicks Basic Mobility (PT)  Score: 6  Functional Limitation: Mobility: Walking and moving around  Mobility: Walking and Moving Around Current Status (): 100 percent impaired, limited or restricted  Mobility: Walking and Moving Around Goal Status (): At least 80 percent but less than 100 percent impaired, limited or restricted      Jaime Olsen PT DPT  7/10/2017

## 2017-07-10 NOTE — PROGRESS NOTES
[facility] Medicine Services  INPATIENT PROGRESS NOTE    Length of Stay: 6  Date of Admission: 7/3/2017  Primary Care Physician: Linda Hoffman, DNP, APRN    Subjective     Chief Complaint:     The patient is intubated and sedated    HPI     The patient's blood pressure has been elevated once again and he is back on a Cardene drip.  Cultures of purulent oral secretions noted yesterday positive for MSSA.  The patient is already on vancomycin.  The patient has developed diffuse edema over the past 36 hours.   The patient's tongue is still swollen and protruding.  IV antibiotics for empiric treatment of CNS infection continues.      Review of Systems     An effort to obtain secondary to intubation and sedation  All pertinent negatives and positives are as above. All other systems have been reviewed and are negative unless otherwise stated.     Objective    Temp:  [98.2 °F (36.8 °C)-100.7 °F (38.2 °C)] 98.7 °F (37.1 °C)  Heart Rate:  [] 98  Resp:  [10-24] 17  BP: (127-175)/() 157/97  FiO2 (%):  [30 %-40 %] 40 %    Lab Results (last 24 hours)     Procedure Component Value Units Date/Time    Urinalysis With / Culture If Indicated [[id number]]  (Abnormal) Collected:  07/09/17 1100    Specimen:  Urine from Urine, Clean Catch Updated:  07/09/17 1109     Color, UA Dark Yellow (A)     Appearance, UA Clear     pH, UA 5.5     Specific Gravity, UA 1.022     Glucose, UA Negative     Ketones, UA Negative     Bilirubin, UA Negative     Blood, UA Negative     Protein, UA Negative     Leuk Esterase, UA Negative     Nitrite, UA Negative     Urobilinogen, UA 1.0 E.U./dL    Narrative:       Urine microscopic not indicated.    Blood Gas, Arterial [[id number]]  (Abnormal) Collected:  07/10/17 0255    Specimen:  Arterial Blood Updated:  07/10/17 0259     Site Arterial: right radial     Gera's Test --      Documented in Rapid Comm        pH, Arterial 7.443 pH units      pCO2, Arterial 32.9  (L) mm Hg      pO2, Arterial 55.9 (L) mm Hg      HCO3, Arterial 22.0 mmol/L      Base Excess, Arterial -1.2 mmol/L      O2 Saturation, Arterial 90.6 (L) %      O2 Saturation Calculated 90.6 (L) %      Barometric Pressure for Blood Gas -- mmHg       Component not reported at this site.        Modality Ventilator     FIO2 30 %      Rate 10.0 Breaths/minute      PEEP 5.0     Vent CPAP/PEEP 5.0    Narrative:       Serial Number: [id number]    : [id number]    Comprehensive Metabolic Panel [[id number]]  (Abnormal) Collected:  07/10/17 0338    Specimen:  Blood Updated:  07/10/17 0404     Glucose 182 (H) mg/dL      BUN 13 mg/dL      Creatinine 0.98 mg/dL      Sodium 144 mmol/L      Potassium 4.1 mmol/L      Chloride 111 (H) mmol/L      CO2 24.0 mmol/L      Calcium 8.6 mg/dL      Total Protein 6.5 g/dL      Albumin 3.40 (L) g/dL      ALT (SGPT) 42 U/L      AST (SGOT) 51 (H) U/L      Alkaline Phosphatase 155 (H) U/L      Total Bilirubin 0.5 mg/dL      eGFR Non African Amer 81 mL/min/1.73      Globulin 3.1 gm/dL      A/G Ratio 1.1 g/dL      BUN/Creatinine Ratio 13.3     Anion Gap 9.0 mmol/L     Respiratory Culture [[id number]]  (Abnormal)  (Susceptibility) Collected:  07/08/17 0403    Specimen:  Sputum from NT Suction Updated:  07/10/17 0744     Respiratory Culture Heavy growth (4+) Staphylococcus aureus (A)     BETA LACTAMASE Positive     Gram Stain Result Greater than 25 WBCs per low power field      Many (4+) Mixed gram positive evelio    Narrative:       For Staphylococcus, penicillin-resistant, oxacillin-susceptible strains are resistant to B-lactamase labile penicillins but susceptible to other B-lactamase stable penicillins, B-lactamase inhibitor combinations, relavant cephems, and carbapenems.    Susceptibility      Staphylococcus aureus     [place]     Clindamycin <=0.25 ug/ml Susceptible     Erythromycin <=0.25 ug/ml Susceptible     Gentamicin <=0.5 ug/ml Susceptible     Inducible Clindamycin Resistance NEG  Negative      Levofloxacin <=0.12 ug/ml Susceptible  [1]      Oxacillin 0.5 ug/ml Susceptible     Penicillin G >=0.5 ug/ml Resistant     Tetracycline <=1 ug/ml Susceptible     Trimethoprim + Sulfamethoxazole <=10 ug/ml Susceptible     Vancomycin <=0.5 ug/ml Susceptible            [1]   Staphylococcus species may develop resistance during prolonged therapy with quinolones. Isolates that are initially susceptible may become resistant within three to four days after initiation of therapy. Testing of repeat isolates may be warranted.              Susceptibility Comments     Staphylococcus aureus      This isolate does not demonstrate inducible clindamycin resistance in vitro.               Vancomycin, Trough [[id number]]  (Normal) Collected:  07/10/17 0830    Specimen:  Blood Updated:  07/10/17 0906     Vancomycin Trough 12.99 mcg/mL           Imaging Results (last 24 hours)     Procedure Component Value Units Date/Time    XR Chest 1 View [[id number]] Collected:  07/10/17 0708     Updated:  07/10/17 0712    Narrative:       EXAMINATION:   XR CHEST 1 VW-  7/10/2017 7:08 AM CDT     HISTORY: Intubated     Single view the chest obtained. Left lung is clear. Cardiac silhouettes  mildly enlarged unchanged from July 9. Left subclavian catheter is  present. Nasogastric tubes present. Mild atelectasis right lung base is  present.       Impression:       Atelectasis right lung base persists not significantly  changed from July 9.        2. Mild-to-moderate cardiomegaly also unchanged  This report was finalized on 07/10/2017 07:09 by Dr. Adolph Echols MD.             Intake/Output Summary (Last 24 hours) at 07/10/17 0951  Last data filed at 07/10/17 0800   Gross per 24 hour   Intake           4190.6 ml   Output             3355 ml   Net            835.6 ml       Physical Exam    Constitutional: No distress.   Seen and discussed with his nurse, Abrahan. No family present. He responds to Ativan plus Diprivan sedation well. ETT secure, Central line  present.    HENT:   Head: Normocephalic and atraumatic.   Eyes: Pupils are equal, round, and reactive to light. The patient's tongue remains swollen and protruded and there is perhaps a bit less malodoruos purulent drainage noted from the oral cavity.   Neck: Neck supple. No JVD present.   Cardiovascular: Normal rate and regular rhythm.  Diffuse edema is noted in both the upper and lower extremities.  Pulmonary/Chest: Effort normal and breath sounds normal.   Ventilated.    Abdominal: Soft. Bowel sounds are normal.   Musculoskeletal: He exhibits edema (trace).   Neurological:   Currently he is sedated with propofol and Ativan.  Discussed case with ENT. Not appropriate for extubation because of his lingual swelling.  If the patient's inguinal swelling is not improved within the next 24 hours, he will likely require a tracheostomy. Patient is more responsive and will follow commands such as moving feet and hands but still becomes agitated off sedation.  Skin: Skin is warm and dry.       Results Review:  I have reviewed the labs, radiology results, and diagnostic studies since my last progress note and made treatment changes reflective of the results.   I have reviewed the current medications.    Assessment/Plan     Hospital Problem List     Angio-edema    Seizures      1. Possible angioedema related to lisinopril.  2. Malignant hypertension.  3. Possible new onset seizure disorder versus convulsive syncope.  4. Posterior reversible encephalopathy syndrome on MRI.  5. Tongue bite with sublingual hematoma.  6. Tobacco abuse.  7. Obstructive sleep apnea, noncompliant with device.  8. Gouty arthritis.  9. Daily alcohol use with Delirium tremens requiring sedation and mechanical ventilation.  10. Hypokalemia.   11. C. Difficile colitilis  12. Lingual or sublingual infection, MSSA  13. Anasarca    PLAN:  Lasix 40 mg IV every 12 hours ×2 doses   Albumin 25 g IV every 12 hours ×2 doses  Add Catapres TTS-2 patch  Decrease  amlodipine to 5mg  Increase metoprolol  Change Flagyl from IV to oral  Continue IV steroids  Continue to consider tracheostomy  I will discuss with Dr. Arroyo the need for continued empiric treatment of CNS infection    Cristino Encinas,    07/10/17   9:51 AM     Approximately 40 minutes of critical care time were spent managing the patient exclusive of billable procedures.

## 2017-07-10 NOTE — PAYOR COMM NOTE
"FROM: HANK SOLIS  PHONE: [phone]  FAX: [phone]    ID: [id number]    Nishant Wahl (52 y.o. Male)     Date of Birth Social Security Number Address Home Phone MRN    1965  [address] [facility] [address] [phone] [id number]    Congregation Marital Status          [facility] Single       Admission Date Admission Type Admitting Provider Attending Provider Department, Room/Bed    7/3/17 Emergency Cristino Encinas DO Robinson, Maurice S, DO [facility] INTENSIVE CARE, I007/1    Discharge Date Discharge Disposition Discharge Destination                      Attending Provider: Cristino Encinas DO     Allergies:  Lipitor [Atorvastatin]    Isolation:  Spore   Infection:  C.difficile (07/07/17)   Code Status:  FULL    Ht:  67\" (170.2 cm)   Wt:  234 lb 6.4 oz (106 kg)    Admission Cmt:  None   Principal Problem:  None                Active Insurance as of 7/3/2017     Primary Coverage     Payor Plan Insurance Group Employer/Plan Group    [facility]      Payor Plan Address Payor Plan Phone Number Effective From Effective To    [address] [address]  7/3/2017     [place] [address]       Subscriber Name Subscriber Birth Date Member ID       NSIHANT WAHL 1965 [id number]                 Emergency Contacts      (Rel.) Home Phone Work Phone Mobile Phone    Yvette Pollack [phone] -- [phone]               History & Physical      Jairon Perez DO at 7/3/2017  3:39 PM              [facility] Medicine Services  HISTORY AND PHYSICAL    Date of Admission: 7/3/2017  Primary Care Physician: Linda Hoffman, DNP, APRN    Subjective     Chief Complaint: tongue swelling preceded possible seizure activity    History of Present Illness  This is a 52-year-old  gentleman who resides at home in [place], [place].  He sees Linda Hoffman for primary care.  He has a long-standing history " "of hypertension.  He tells me that this morning he went to his father's home to discuss with him the fact that his tongue had been swelling and feeling thick since early this morning.  He tells me that he was speaking with his father and ultimately the next thing that he remembers was having tunnel vision and waking up with EMS there.  His father told EMS that the patient had \"diffuse shaking throughout his body.\"  The patient tells me that he has never had a seizure.  There is no family history of seizure.  He apparently had taken his blood pressure while at his father's home and he had a 212 systolic reading.    His blood pressures typically well-controlled.  He tells me that he has been on lisinopril for \"years.\"  He has never had any problems with tongue swelling with this.  He says that his speech had become thick and he was having difficulty with swallowing.  He ultimately also bit his tongue in the ensuing convulsive episode.  This only complicated matters.  He was given IV Decadron in the emergency department.  He has been referred for admission secondary to escalated hypertension, tongue swelling, and possible new onset seizure.  However, he has been found to have PRES on his MRI and likely had convulsive syncope after an episode of severe hypertension.    Review of Systems   Constitutional: Negative.    HENT: Positive for drooling. Negative for congestion, facial swelling, sinus pressure, sore throat and trouble swallowing.    Respiratory: Positive for apnea (he has a history of obstructive sleep apnea, but is noncompliant with his device.). Negative for cough, choking, chest tightness, shortness of breath, wheezing and stridor.    Cardiovascular: Positive for leg swelling. Negative for chest pain and palpitations.   Gastrointestinal: Negative.    Genitourinary: Negative.    Musculoskeletal: Negative.    Skin: Negative.    Neurological: Positive for seizures, syncope (likely) and speech difficulty (because " of his tongue issue). Negative for dizziness, tremors, weakness, light-headedness and headaches.   Psychiatric/Behavioral: Negative.      Otherwise complete ROS reviewed and negative except as mentioned in the HPI.    Past Medical History:   Past Medical History:   Diagnosis Date   • Anxiety    • Arthritis    • Gout    • HTN (hypertension)    • Hyperlipidemia      Past Surgical History:History reviewed. No pertinent surgical history.    Social History:  reports that he has been smoking Cigarettes.  He has a 9.90 pack-year smoking history. He does not have any smokeless tobacco history on file. He reports that he drinks about 1.2 oz of alcohol per week  He reports that he does not use illicit drugs.    Family History: family history includes Diabetes in his maternal grandmother and mother; Heart attack in his paternal grandfather; Heart disease in his father; Hypertension in his father and mother; Kidney disease in his sister; No Known Problems in his daughter, maternal grandfather, paternal grandmother, and son.       Allergies:  Allergies   Allergen Reactions   • Lipitor [Atorvastatin] Rash       Medications:  Prior to Admission medications    Medication Sig Start Date End Date Taking? Authorizing Provider   cloNIDine (CATAPRES) 0.1 MG tablet Take 0.1 mg by mouth every night.   Yes Historical Provider, MD   fenofibrate (TRICOR) 145 MG tablet Take 1 tablet by mouth Every Night. 9/30/16  Yes Linda Hoffman DNP, APRN   ibuprofen (ADVIL,MOTRIN) 800 MG tablet Take 800 mg by mouth Every 6 (Six) Hours As Needed for Moderate Pain (4-6).   Yes Historical Provider, MD   indomethacin (INDOCIN) 50 MG capsule Take 1 capsule by mouth 3 (Three) Times a Day As Needed for mild pain (1-3).  Patient taking differently: Take 50 mg by mouth 3 (Three) Times a Day As Needed for Mild Pain (1-3) (gout). 9/30/16  Yes Linda Hoffman DNP, APRN   lisinopril-hydrochlorothiazide (PRINZIDE,ZESTORETIC) 20-25 MG per tablet Take 1 tablet by  "mouth Daily. 9/30/16  Yes Linda Hoffman DNP, APRN   metoprolol tartrate (LOPRESSOR) 50 MG tablet Take 50 mg by mouth Every 12 (Twelve) Hours.   Yes Historical Provider, MD   metoprolol tartrate (LOPRESSOR) 50 MG tablet Take 1 tablet by mouth 2 (Two) Times a Day. 9/30/16 7/3/17 Yes Linda Hoffman DNP, APRN   vardenafil (LEVITRA) 10 MG tablet Take 1 tablet by mouth Daily As Needed for erectile dysfunction. 9/30/16   Linda Hoffman DNP, APRN   allopurinol (ZYLOPRIM) 300 MG tablet Take 1 tablet by mouth Daily. prn 9/30/16 7/3/17  Linda Hoffman DNP, APRN   ibuprofen (ADVIL,MOTRIN) 800 MG tablet Take 1 tablet by mouth Every 6 (Six) Hours As Needed for mild pain (1-3) or moderate pain (4-6).  Patient taking differently: Take 800 mg by mouth Every 6 (Six) Hours As Needed for Moderate Pain (4-6). 9/30/16 7/3/17  Linda Hoffman DNP, APRN       Objective     Vital Signs: /93  Pulse 62  Temp 98 °F (36.7 °C)  Resp 18  Ht 68\" (172.7 cm)  Wt 200 lb (90.7 kg)  SpO2 98%  BMI 30.41 kg/m2  Physical Exam   Constitutional: He is oriented to person, place, and time. He appears well-developed and well-nourished.   HENT:   Head: Normocephalic and atraumatic.   Significant bruising to his tongue as well as a sublingual hematoma.  He does not appear to have any trouble handling secretions at this point in time.   Eyes: Conjunctivae and EOM are normal. Pupils are equal, round, and reactive to light.   Neck: Neck supple. No JVD present. No tracheal deviation present. No thyromegaly present.   Cardiovascular: Normal rate, regular rhythm, normal heart sounds and intact distal pulses.  Exam reveals no gallop and no friction rub.    No murmur heard.  Pulmonary/Chest: Effort normal and breath sounds normal. No stridor. No respiratory distress. He has no wheezes. He has no rales. He exhibits no tenderness.   Abdominal: Soft. Bowel sounds are normal. He exhibits no distension. There is no tenderness. There is no " rebound and no guarding.   Musculoskeletal: Normal range of motion. He exhibits edema (1+). He exhibits no tenderness or deformity.   Lymphadenopathy:     He has no cervical adenopathy.   Neurological: He is alert and oriented to person, place, and time. He displays normal reflexes. No cranial nerve deficit. He exhibits normal muscle tone.   Skin: Skin is warm and dry. No rash noted.   Psychiatric: He has a normal mood and affect. His behavior is normal. Judgment and thought content normal.     Results Reviewed:  Lab Results (last 24 hours)     Procedure Component Value Units Date/Time    CBC & Differential [[id number]] Collected:  07/03/17 0807    Specimen:  Blood Updated:  07/03/17 0832    Narrative:       The following orders were created for panel order CBC & Differential.  Procedure                               Abnormality         Status                     ---------                               -----------         ------                     CBC Auto Differential[[id number]]        Abnormal            Final result                 Please view results for these tests on the individual orders.    CBC Auto Differential [[id number]]  (Abnormal) Collected:  07/03/17 0807    Specimen:  Blood Updated:  07/03/17 0832     WBC 11.95 (H) 10*3/mm3      RBC 3.93 (L) 10*6/mm3      Hemoglobin 11.9 (L) g/dL      Hematocrit 34.1 (L) %      MCV 86.8 fL      MCH 30.3 pg      MCHC 34.9 g/dL      RDW 14.0 %      RDW-SD 44.0 fl      MPV 9.6 fL      Platelets 245 10*3/mm3      Neutrophil % 82.0 (H) %      Lymphocyte % 8.3 (L) %      Monocyte % 8.9 %      Eosinophil % 0.3 %      Basophil % 0.2 %      Immature Grans % 0.3 %      Neutrophils, Absolute 9.81 (H) 10*3/mm3      Lymphocytes, Absolute 0.99 10*3/mm3      Monocytes, Absolute 1.06 10*3/mm3      Eosinophils, Absolute 0.03 10*3/mm3      Basophils, Absolute 0.02 10*3/mm3      Immature Grans, Absolute 0.04 (H) 10*3/mm3     Comprehensive Metabolic Panel [[id number]]  (Abnormal) Collected:   07/03/17 0807    Specimen:  Blood Updated:  07/03/17 0841     Glucose 127 (H) mg/dL      BUN 9 mg/dL      Creatinine 1.19 mg/dL      Sodium 141 mmol/L      Potassium 3.0 (L) mmol/L      Chloride 102 mmol/L      CO2 29.0 mmol/L      Calcium 8.8 mg/dL      Total Protein 6.9 g/dL      Albumin 4.00 g/dL      ALT (SGPT) 39 U/L      AST (SGOT) 72 (H) U/L      Alkaline Phosphatase 69 U/L      Total Bilirubin 0.6 mg/dL      eGFR Non African Amer 64 mL/min/1.73      Globulin 2.9 gm/dL      A/G Ratio 1.4 g/dL      BUN/Creatinine Ratio 7.6     Anion Gap 10.0 mmol/L     Urinalysis With / Culture If Indicated [[id number]]  (Abnormal) Collected:  07/03/17 0834    Specimen:  Urine from Urine, Clean Catch Updated:  07/03/17 0858     Color, UA Yellow     Appearance, UA Clear     pH, UA 7.5     Specific Gravity, UA 1.014     Glucose, UA Negative     Ketones, UA Negative     Bilirubin, UA Negative     Blood, UA Small (1+) (A)     Protein, UA 30 mg/dL (1+) (A)     Leuk Esterase, UA Negative     Nitrite, UA Negative     Urobilinogen, UA 0.2 E.U./dL    Urinalysis, Microscopic Only [[id number]]  (Abnormal) Collected:  07/03/17 0834    Specimen:  Urine from Urine, Clean Catch Updated:  07/03/17 0858     RBC, UA 3-5 (A) /HPF      WBC, UA 0-2 (A) /HPF      Bacteria, UA None Seen /HPF      Squamous Epithelial Cells, UA 0-2 /HPF      Hyaline Casts, UA None Seen /LPF      Methodology Automated Microscopy    Urine Drug Screen [[id number]]  (Normal) Collected:  07/03/17 0834    Specimen:  Urine from Urine, Clean Catch Updated:  07/03/17 0930     Amphetamine Screen, Urine Negative     Barbiturates Screen, Urine Negative     Benzodiazepine Screen, Urine Negative     Cocaine Screen, Urine Negative     Methadone Screen, Urine Negative     Opiate Screen Negative     Phencyclidine (PCP), Urine Negative     THC, Screen, Urine Negative    Narrative:       Negative Thresholds For Drugs Screened in Urine:    Amphetamines          500 ng/ml  Barbiturates           200 ng/ml  Benzodiazepines       200 ng/ml  Cocaine               150 ng/ml  Methadone             150 ng/ml  Opiates               300 ng/ml  Phencyclidine         25 ng/ml  THC                      50 ng/ml    The normal value for all drugs tested is negative. This report includes final unconfirmed screening results.  A positive result by this assay can be, at your request, sent to the Reference Lab for confirmation by gas chromatography. Unconfirmed results must not be used for non-medical purposes, such as employment or legal testing. Clinical consideration should be applied to any drug of abuse test result, particularly when unconfirmed results are used.        Imaging Results (last 24 hours)     Procedure Component Value Units Date/Time    XR Shoulder 2+ View Right [[id number]] Collected:  07/03/17 0913     Updated:  07/03/17 0918    Narrative:       EXAMINATION: XR SHOULDER 2+ VW RIGHT-     7/3/2017 10:02 AM EDT     HISTORY: Right shoulder pain.     Right shoulder, 3 views.     Old healed fracture of the right clavicle at the junction of the mid and  distal one third.  Bony hypertrophy of the distal clavicle with narrowing of the outlet and  increased risk for impingement.  No AC joint separation.     Mild humeral head spurring.     High riding humeral head with the appearance of rotator cuff  insufficiency.     Summary:  1. Bony narrowing of the outlet based on the clavicle.  2. High riding humeral head.  3. MRI correlation recommended to assess the rotator cuff integrity..  This report was finalized on 07/03/2017 09:15 by Dr. Camilo Almodovar MD.    CT Head Without Contrast [[id number]] Collected:  07/03/17 1114     Updated:  07/03/17 1123    Narrative:       EXAMINATION: CT HEAD WO CONTRAST-      7/3/2017 9:43 AM EDT     HISTORY: seizures     In order to have a CT radiation dose as low as reasonably achievable  Automated Exposure Control was utilized for adjustment of the mA and/or  KV according to patient  size.     DLP in mGycm= 821.     Ill-defined low density within both occipital lobes. No mass effect. No  acute hemorrhage.     Normal ventricle size.     No skull fracture is seen.  Clear paranasal sinuses.     Summary:  1. Bilateral occipital lobe hypodensity compatible with subacute  ischemic change. MRI correlation recommended.  2. Report called to Dr. Zamarripa in the emergency room at 11:15 AM.                                   This report was finalized on 07/03/2017 11:20 by Dr. Camilo Almodovar MD.    MRI Brain With & Without Contrast [[id number]] Collected:  07/03/17 1238     Updated:  07/03/17 1306    Narrative:       MRI BRAIN W WO CONTRAST- 7/3/2017 11:57 AM CDT     HISTORY: Ischemic changes; R13.12-Dysphagia, oropharyngeal phase     COMPARISON: None.       TECHNIQUE: Multiplanar imaging of the brain was performed in a routine  fashion before and after the intravenous injection of gadolinium  contrast.     FINDINGS:   Diffusion: No restriction of diffusion to suggest acute ischemia.     Midline structures: Nondisplaced.     Ventricles: Normal in configuration and symmetric in size.     Masses: No masses or mass effect.     Basilar cisterns: Maintained.     Extra axial space: No abnormal extra-axial fluid.     Gray-white matter signal: There is abnormal signal. There is bilateral  subcortical signal on the T2 and FLAIR sequences involving the  cerebellum and posterior occipital and parietal lobes. There is no  associated enhancement. Most likely this is posterior reversible  encephalopathy syndrome. (P RES)     Cerebellum: Abnormal signal is noted in the cerebellum most likely  associated with the pres.     Brainstem: Normal.     Enhancement: No abnormal enhancement.     Other: Proximal cervical spinal cord is normal. Bilateral globes and  orbits are normal in appearance. Normal cerebrovascular flow voids  noted. Mucous cyst is noted in the right maxillary antrum mucosal  thickening is present in the left  maxillary antrum       Impression:       1. Posterior reversible encephalopathy syndrome. Follow-up in 8 weeks is  suggested.         This report was finalized on 07/03/2017 12:53 by Dr. Adolph Echols MD.        I have personally reviewed and interpreted the radiology studies and ECG obtained at time of admission.     Assessment / Plan     Assessment & Plan   1.  Possible angioedema related to lisinopril.  2.  Escalated hypertension.  3.  Possible new onset seizure disorder versus convulsive syncope.  4.  Posterior reversible encephalopathy syndrome on MRI.  5.  Tongue bite with sublingual hematoma.  6.  Tobacco abuse.  7.  Obstructive sleep apnea, noncompliant with device.  8.  Gouty arthritis.    He will be admitted to my service at [facility].  He is placed in the cardiac care unit to follow his blood pressure and also be placed on a Cardene drip.  I will escalate his antihypertensive regimen and obviously hold his lisinopril.  Also wanted to do a 2-D echocardiogram given his edema and difficult to control hypertension.    Consult neurology to evaluate the finding of PRES on his MRI.  I doubt very seriously that he had a true seizure today.  He likely had an episode related to this finding or also possibly convulsive syncope.  I doubt he will need antiepileptic drugs.  I will leave whether or not he needs an EEG to the neurologist.    Consult otolaryngology to evaluate his tongue hematoma and sublingual hematoma.  Hopefully this issue will not worsen.  He also apparently had angioedema that preceded this issue.  Remain on IV Decadron and Pepcid.  Hold his nonsteroidal anti-inflammatory drugs for now as well.  Pain control with oral Norco or IV Dilaudid.    SCDs for DVT prophylaxis.    Code Status: Full.      I discussed the patients findings and my recommendations with the patient, his daughter, and his nurse, Ginny.     Estimated length of stay is at least overnight.     Jairon Perez, DO      07/03/17   3:39 PM               Electronically signed by Jairon Perez DO at 7/3/2017  4:10 PM           Physician Progress Notes (last 7 days) (Notes from 7/3/2017  7:52 AM through 7/10/2017  7:52 AM)      Royal Campos MD at 7/4/2017  7:35 AM  Version 2 of 2           Neurology Progress Note      Chief Complaint:  PRES, Seizure, possible W/D alcohol    Subjective     Subjective:    Overnight, off and on Cardene, he remains agitated at times, low grade fevers, no seizure activity but confused    Review of Systems:   -A 14 point review of systems is completed and is negative except for tongue pain, agitation      Objective      Vital Signs  Temp:  [98 °F (36.7 °C)-99.7 °F (37.6 °C)] 99.4 °F (37.4 °C)  Heart Rate:  [] 84  Resp:  [18-22] 20  BP: (133-207)/() 166/122    Physical Exam:  NC/AT  Tongue remains bruised, swollen  Tachycardic  CTAB  Abd NT  Ext:  No C/C/E  Diaphoretic    Neuro:  Awake, alert, oriented X 3, agitated  CN:  PERRL, Face symmetric, tongue midline, palate symmetric, hearing intact  Moving all extremities 5/5 throughout  DTRs:  1+ and symmetric, W/D to Babinski  Mild finger to nose tremor  Following all commands  Fluent, mild dysarthria     Results Review:    I reviewed the patient's new clinical results.      Results from last 7 days  Lab Units 07/03/17  0807   WBC 10*3/mm3 11.95*   HEMOGLOBIN g/dL 11.9*   HEMATOCRIT % 34.1*   PLATELETS 10*3/mm3 245          Results from last 7 days  Lab Units 07/03/17  0807   SODIUM mmol/L 141   POTASSIUM mmol/L 3.0*   CHLORIDE mmol/L 102   CO2 mmol/L 29.0   BUN mg/dL 9   CREATININE mg/dL 1.19   CALCIUM mg/dL 8.8   BILIRUBIN mg/dL 0.6   ALK PHOS U/L 69   ALT (SGPT) U/L 39   AST (SGOT) U/L 72*   GLUCOSE mg/dL 127*        No results found for: PHOS, MG, PROTIME, INR, APTT  No components found for: POCGLUC  No components found for: A1C  Lab Results   Component Value Date    HDL 36 02/04/2016     No components found for: B12  No results found  for: TSH    Assessment/Plan     Hospital Problem List    Active Problems:    Angio-edema    Impression  52-year-old gentleman who has a history of hypertension, admitted EtOH use, admitted with PRES, seizure, now with some agitation.  Also bit his tongue and has some swelling, may have developed angioedema prior to the PRES.     Plan  1. PRES   --Continue to slowly lower blood pressure, goal DBP < 100, SBPs in the 170s range.    --titrate Cardene as necessary.     --Continue Keppra.  Will be on this until he has a clear MRI as o/p in 4-6 weeks.    2. History of heavy EtOH use.  Now agitated, tachycardic, diaphoretic   --Last known drink was several days ago.  I believe he is currently withdrawing.     --Spoke with Primary who will start BZD.     --This is likely an underlying problem and most likely not the cause of his seizure.   --Monitor BPs closely on BZD, adjust Cardene as necessary   --Electrolytes, fluids per Primary.  Start Banana Bag    3. Tongue swelling: Angioedema? ENT consulted. Advance diet as tolerated     Continue ICU monitoring      Royal Campos MD  07/04/17  7:35 AM       Electronically signed by Roayl Campos MD at 7/4/2017  7:55 AM      Royal Campos MD at 7/4/2017  7:35 AM  Version 1 of 2           Neurology Progress Note      Chief Complaint:  PRES, Seizure, possible W/D alcohol    Subjective     Subjective:    Overnight, off and on Cardene, he remains agitated at times, low grade fevers, no seizure activity but confused      Objective      Vital Signs  Temp:  [98 °F (36.7 °C)-99.7 °F (37.6 °C)] 99.4 °F (37.4 °C)  Heart Rate:  [] 84  Resp:  [18-22] 20  BP: (133-207)/() 166/122    Physical Exam:  NC/AT  Tongue remains bruised, swollen  Tachycardic  CTAB  Abd NT  Ext:  No C/C/E  Diaphoretic    Neuro:  Awake, alert, oriented X 3, agitated  CN:  PERRL, Face symmetric, tongue midline, palate symmetric, hearing intact  Moving all extremities 5/5 throughout  DTRs:  1+  and symmetric, W/D to Babinski  Mild finger to nose tremor  Following all commands  Fluent, mild dysarthria     Results Review:    I reviewed the patient's new clinical results.      Results from last 7 days  Lab Units 07/03/17  0807   WBC 10*3/mm3 11.95*   HEMOGLOBIN g/dL 11.9*   HEMATOCRIT % 34.1*   PLATELETS 10*3/mm3 245          Results from last 7 days  Lab Units 07/03/17  0807   SODIUM mmol/L 141   POTASSIUM mmol/L 3.0*   CHLORIDE mmol/L 102   CO2 mmol/L 29.0   BUN mg/dL 9   CREATININE mg/dL 1.19   CALCIUM mg/dL 8.8   BILIRUBIN mg/dL 0.6   ALK PHOS U/L 69   ALT (SGPT) U/L 39   AST (SGOT) U/L 72*   GLUCOSE mg/dL 127*        No results found for: PHOS, MG, PROTIME, INR, APTT  No components found for: POCGLUC  No components found for: A1C  Lab Results   Component Value Date    HDL 36 02/04/2016     No components found for: B12  No results found for: TSH    Assessment/Plan     Hospital Problem List    Active Problems:    Angio-edema    Impression  52-year-old gentleman who has a history of hypertension, admitted EtOH use, admitted with PRES, seizure, now with some agitation.  Also bit his tongue and has some swelling, may have developed angioedema prior to the PRES.     Plan  1. PRES   --Continue to slowly lower blood pressure, goal DBP < 100, SBPs in the 170s range.    --titrate Cardene as necessary.     --Continue Keppra.  Will be on this until he has a clear MRI as o/p in 4-6 weeks.    2. History of heavy EtOH use.  Now agitated, tachycardic, diaphoretic   --Last known drink was several days ago.  I believe he is currently withdrawing.     --Spoke with Primary who will start BZD.     --This is likely an underlying problem and most likely not the cause of his seizure.   --Monitor BPs closely on BZD, adjust Cardene as necessary    3. Tongue swelling: Angioedema? ENT consulted. Advance diet as tolerated     Continue ICU monitoring      Royal Campos MD  07/04/17  7:35 AM       Electronically signed by  "Royal Campos MD at 7/4/2017  7:50 AM      Jairon Perez DO at 7/4/2017  9:10 AM  Version 1 of 1             [facility] Medicine Services  INPATIENT PROGRESS NOTE    Length of Stay: 0  Date of Admission: 7/3/2017  Primary Care Physician: Linda Hoffman, DNP, APRN    Subjective   Chief Complaint: PRES  HPI   He seems to have developed a bit of a problem with mild alcohol withdrawal overnight.  The patient told me yesterday that he does drink beer, but likely minimized his intake.  He gave a higher level of alcohol intake to Dr. campbell on his interview.  His mother is at the bedside today and does admit that he drinks beer on a regular basis.  She tells me that she has noticed that he has \"not been right\" for at least a week.  She tells me that he has exhibited confusion and strange behavior.  I explained to her that he could have been having problems with encephalopathy related to malignant hypertension and PRES.     Review of Systems   All pertinent negatives and positives are as above. All other systems have been reviewed and are negative unless otherwise stated.     Objective    Temp:  [98.4 °F (36.9 °C)-99.7 °F (37.6 °C)] 99.4 °F (37.4 °C)  Heart Rate:  [] 98  Resp:  [18-22] 18  BP: (133-191)/() 150/100  Physical Exam  Constitutional: He is oriented to person, place, and time. He appears well-developed and well-nourished. Discussed with his nurse, Shorty.  He currently is having his echocardiogram done.  His mother is at the bedside and questions were answered with her.  Head: Normocephalic and atraumatic.   Significant bruising to his tongue as well as a sublingual hematoma. No worse. He does not appear to have any trouble handling secretions at this point in time.   Eyes: Conjunctivae and EOM are normal. Pupils are equal, round, and reactive to light.   Neck: Neck supple. No JVD present. No tracheal deviation present. No thyromegaly present.   Cardiovascular: " Normal rate, regular rhythm, normal heart sounds and intact distal pulses. Exam reveals no gallop and no friction rub. No murmur heard.  Pulmonary/Chest: Effort normal and breath sounds normal. No stridor. No respiratory distress. He has no wheezes. He has no rales. He exhibits no tenderness.   Abdominal: Soft. Bowel sounds are normal. He exhibits no distension. There is no tenderness. There is no rebound and no guarding.   Musculoskeletal: Normal range of motion. He exhibits edema (1+). He exhibits no tenderness or deformity. Neurological: He is alert and oriented to person, place, and time. He displays normal reflexes. No cranial nerve deficit. He exhibits normal muscle tone. A bit tremulous with no confusion. Boisterous.   Skin: Skin is warm and dry. No rash noted.   Psychiatric: He has a normal mood and affect. His behavior is normal. Judgment and thought content normal.     Results Review:  I have reviewed the labs, radiology results, and diagnostic studies.    Laboratory Data:   Repeat BMP and magnesium level now.    I have reviewed the patient current medications.     Assessment/Plan   Assessment:   1. Possible angioedema related to lisinopril.  2. Malignant hypertension.  3. Possible new onset seizure disorder versus convulsive syncope.  4. Posterior reversible encephalopathy syndrome on MRI.  5. Tongue bite with sublingual hematoma.  6. Tobacco abuse.  7. Obstructive sleep apnea, noncompliant with device.  8. Gouty arthritis.  9. Daily alcohol use with minor withdrawal symptoms.  10. Hypokalemia.      Plan:  Recheck BMP and magnesium now.    Case discussed with Dr. Campos from neurology. He does have findings consistent with PRES on his MRI. He started the patient on Keppra last night.  No current need for EEG.  Continue to treat the patient's blood pressure conservatively and become more aggressive over the next few days.  He currently is not on Cardene, but has a blood pressure 179/113.  I would like to  restart him back on his home metoprolol.  Continue to hold his other antihypertensives.  Recommend not being placed back on lisinopril.     I consulted otolaryngology yesterday to evaluate his tongue hematoma and sublingual hematoma.  I had initially ordered steroids, but they discontinued them at this point in time we will just watch his progress.      Scheduled oral Valium with as needed IV Ativan.  Multivitamin and thiamine replacement.  Nicotine replacement.     SCDs for DVT prophylaxis.    Remain in the CCU for now.    Discharge Planning: I expect patient to be discharged to home in 2-3 days.    Jairon Perez DO   07/04/17   9:10 AM       Electronically signed by Jairon Perez DO at 7/4/2017  9:30 AM      Royal Campos MD at 7/5/2017  7:32 AM  Version 2 of 2           Neurology Progress Note      Chief Complaint:  PRES, Seizure, DTs    Subjective     Subjective:    Nurse notes that daughter stated he drinks about a case a day.  He developed severe DT's yesterday, no seizure activity but very combative, BPs increasing, was intubated for patient safety.  Requiring large doses of propofol and Ativan to maintain sedation.    Review of Systems:   -Unable to obtain. Sedated      Objective      Vital Signs  Temp:  [98.5 °F (36.9 °C)-98.9 °F (37.2 °C)] 98.5 °F (36.9 °C)  Heart Rate:  [0-103] 67  Resp:  [12-20] 19  BP: (126-181)/() 135/95  FiO2 (%):  [30 %-60 %] 30 %    Physical Exam:  Off sedation:    Awakens  CTAB  RRR  Abdomen distended, NT  Ext:  No edema    Neuro:  Awake, opens eyes to command, voice  Follows commands  Restrained but actively pulling against restraints X 4, symmetric  DTR:  Difficult to assess, 2+ in P/BC bilaterally, W/D Babinski  Withdraws to pain X 4  PERRL, EOMi, Positive Dolls, positive Gag     Results Review:    I reviewed the patient's new clinical results.      Results from last 7 days  Lab Units 07/05/17  0158 07/03/17  0807   WBC 10*3/mm3 15.95* 11.95*   HEMOGLOBIN g/dL  12.8* 11.9*   HEMATOCRIT % 38.1* 34.1*   PLATELETS 10*3/mm3 246 245          Results from last 7 days  Lab Units 07/05/17  0158 07/04/17  0937 07/03/17  0807   SODIUM mmol/L 143 142 141   POTASSIUM mmol/L 3.7 3.8 3.0*   CHLORIDE mmol/L 105 105 102   CO2 mmol/L 28.0 26.0 29.0   BUN mg/dL 16 12 9   CREATININE mg/dL 1.10 1.13 1.19   CALCIUM mg/dL 9.1 9.1 8.8   BILIRUBIN mg/dL  --   --  0.6   ALK PHOS U/L  --   --  69   ALT (SGPT) U/L  --   --  39   AST (SGOT) U/L  --   --  72*   GLUCOSE mg/dL 96 126* 127*        Lab Results   Component Value Date    MG 2.0 07/04/2017     No components found for: POCGLUC  No components found for: A1C  Lab Results   Component Value Date    HDL 36 02/04/2016     No components found for: B12  No results found for: TSH     Echo results:  Interpretation Summary      · Hyperdyamic LVEF (>70%).  · Left ventricular wall thickness is consistent with borderline concentric hypertrophy.  · Right ventricular cavity is moderately dilated.  · Insufficient TR velocity profile to estimate the right ventricular systolic pressure.  · No significant valvular pathology.         Assessment/Plan     Hospital Problem List    Active Problems:    Angio-edema    Seizures    Impression  52-year-old gentleman who has a history of hypertension, admitted EtOH use (reported case/day), admitted with PRES, seizure, now DTs.   Also bit his tongue and has some swelling, may have developed angioedema prior to the PRES.      Plan  1. PRES  --DBPs under 100 overnight, continue Cardene.  Slowly lower BPs over the next 24-48 hours  --titrate Cardene as necessary.   --Continue Keppra. No evidence of seizures.  Will be on this until he has a clear MRI as o/p in 4-6 weeks.  --Plan to repeat imaging when stable or if condition worsens.  His neuro exam is stable.     2. DTs  --Continue Ativan, propofol  --Now intubated and sedated.  He was too agitated and required intubation and sedation.   --Worried about protection of  airway   --Agitation was causing significant rise in blood pressure     3. Tongue swelling:  Angioedema ENT following          Royal Campos MD  07/05/17  7:32 AM       Electronically signed by Royal Campos MD at 7/5/2017  7:45 AM      Royal Campos MD at 7/5/2017  7:32 AM  Version 1 of 2           Neurology Progress Note      Chief Complaint:  PRES, Seizure, DTs    Subjective     Subjective:    Nurse notes that daughter stated he drinks about a case a day.  He developed severe DT's yesterday, no seizure activity but very combative, BPs increasing, was intubated for patient safety.  Requiring large doses of propofol and Ativan to maintain sedation.      Objective      Vital Signs  Temp:  [98.5 °F (36.9 °C)-98.9 °F (37.2 °C)] 98.5 °F (36.9 °C)  Heart Rate:  [0-103] 67  Resp:  [12-20] 19  BP: (126-181)/() 135/95  FiO2 (%):  [30 %-60 %] 30 %    Physical Exam:  Off sedation:    Awakens  CTAB  RRR  Abdomen distended, NT  Ext:  No edema    Neuro:  Awake, opens eyes to command, voice  Follows commands  Restrained but actively pulling against restraints X 4, symmetric  DTR:  Difficult to assess, 2+ in P/BC bilaterally, W/D Babinski  Withdraws to pain X 4  PERRL, EOMi, Positive Dolls, positive Gag     Results Review:    I reviewed the patient's new clinical results.      Results from last 7 days  Lab Units 07/05/17  0158 07/03/17  0807   WBC 10*3/mm3 15.95* 11.95*   HEMOGLOBIN g/dL 12.8* 11.9*   HEMATOCRIT % 38.1* 34.1*   PLATELETS 10*3/mm3 246 245          Results from last 7 days  Lab Units 07/05/17  0158 07/04/17  0937 07/03/17  0807   SODIUM mmol/L 143 142 141   POTASSIUM mmol/L 3.7 3.8 3.0*   CHLORIDE mmol/L 105 105 102   CO2 mmol/L 28.0 26.0 29.0   BUN mg/dL 16 12 9   CREATININE mg/dL 1.10 1.13 1.19   CALCIUM mg/dL 9.1 9.1 8.8   BILIRUBIN mg/dL  --   --  0.6   ALK PHOS U/L  --   --  69   ALT (SGPT) U/L  --   --  39   AST (SGOT) U/L  --   --  72*   GLUCOSE mg/dL 96 126* 127*        Lab Results    Component Value Date    MG 2.0 07/04/2017     No components found for: POCGLUC  No components found for: A1C  Lab Results   Component Value Date    HDL 36 02/04/2016     No components found for: B12  No results found for: TSH    Assessment/Plan     Hospital Problem List    Active Problems:    Angio-edema    Seizures    Impression  52-year-old gentleman who has a history of hypertension, admitted EtOH use (reported case/day), admitted with PRES, seizure, now DTs.   Also bit his tongue and has some swelling, may have developed angioedema prior to the PRES.      Plan  1. PRES  --DBPs under 100 overnight, continue Cardene.  Slowly lower BPs over the next 24-48 hours  --titrate Cardene as necessary.   --Continue Keppra. No evidence of seizures.  Will be on this until he has a clear MRI as o/p in 4-6 weeks.  --Plan to repeat imaging when stable or if condition worsens.  His neuro exam is stable.     2. DTs  --Continue Ativan, propofol  --Now intubated and sedated.  He was too agitated and required intubation and sedation.   --Worried about protection of airway   --Agitation was causing significant rise in blood pressure     3. Tongue swelling:  Angioedema ENT following          Royal Campos MD  07/05/17  7:32 AM       Electronically signed by Royal Campos MD at 7/5/2017  7:43 AM      Jairon Perez DO at 7/5/2017  7:45 AM  Version 2 of 2             [facility] Medicine Services  INPATIENT PROGRESS NOTE    Length of Stay: 1  Date of Admission: 7/3/2017  Primary Care Physician: Linda Hoffman, DNP, APRN    Subjective   Chief Complaint: Alcohol withdrawal  HPI   He required intubation yesterday afternoon.  Please see separate documentation.    He has required max propofol and recurrent benzodiazepine administration throughout the course of the night to calm.  Dr. Campos saw him this morning with the propofol on cause and felt the patient followed commands, but still very  agitated and tremulous.    Review of Systems   Unable to assess secondary to the patient's intubated and sedated state.     Objective    Temp:  [98.5 °F (36.9 °C)-98.9 °F (37.2 °C)] 98.5 °F (36.9 °C)  Heart Rate:  [0-103] 67  Resp:  [12-20] 19  BP: (126-181)/() 135/95  FiO2 (%):  [30 %-60 %] 30 %  Physical Exam   Constitutional: No distress.   Seen and discussed with his nurse, Rehana. No family present. He is calm and sedate. ETT secure, PIVs.    HENT:   Head: Normocephalic and atraumatic.   Eyes: Pupils are equal, round, and reactive to light.   Neck: Neck supple. No JVD present.   Cardiovascular: Normal rate and regular rhythm.    Pulmonary/Chest: Effort normal and breath sounds normal.   Ventilated.    Abdominal: Soft. Bowel sounds are normal.   Musculoskeletal: He exhibits edema (trace).   Neurological:   Currently he is sedated with propofol and Ativan.  Dr. Campos examined him and felt him to be appropriate neurologically   Skin: Skin is warm and dry.       Intake/Output Summary (Last 24 hours) at 07/05/17 0756  Last data filed at 07/05/17 0600   Gross per 24 hour   Intake           1117.7 ml   Output             1425 ml   Net           -307.3 ml     Results Review:  I have reviewed the labs, radiology results, and diagnostic studies.    Laboratory Data:     Results from last 7 days  Lab Units 07/05/17  0158 07/03/17  0807   WBC 10*3/mm3 15.95* 11.95*   HEMOGLOBIN g/dL 12.8* 11.9*   HEMATOCRIT % 38.1* 34.1*   PLATELETS 10*3/mm3 246 245       Results from last 7 days  Lab Units 07/05/17  0158 07/04/17  0937 07/03/17  0807   SODIUM mmol/L 143 142 141   POTASSIUM mmol/L 3.7 3.8 3.0*   CHLORIDE mmol/L 105 105 102   CO2 mmol/L 28.0 26.0 29.0   BUN mg/dL 16 12 9   CREATININE mg/dL 1.10 1.13 1.19   CALCIUM mg/dL 9.1 9.1 8.8   BILIRUBIN mg/dL  --   --  0.6   ALK PHOS U/L  --   --  69   ALT (SGPT) U/L  --   --  39   AST (SGOT) U/L  --   --  72*   GLUCOSE mg/dL 96 126* 127*   Magnesium 2.0.     Radiology Data:      Imaging Results (last 24 hours)     Procedure Component Value Units Date/Time    XR Chest 1 View [[id number]] Collected:  07/04/17 1543     Updated:  07/04/17 1548    Narrative:       EXAMINATION: XR CHEST 1 VW- 7/4/2017 14:43 CST     HISTORY: Intubation     Comparison: None     Findings:  Endotracheal tube is in place with tip approximately 3 cm above the  jolly. Low lung volumes are present bilaterally. There is atelectasis  at the left lung base. The lung bases otherwise appear clear. The  pulmonary vasculature appears normal.       Impression:       Impression:  1. Apparent appropriate endotracheal tube position.  2. Left basilar atelectasis.  This report was finalized on 07/04/2017 15:45 by Dr. Driss Ward MD.    XR Chest 1 View [[id number]] Collected:  07/05/17 0724     Updated:  07/05/17 0728    Narrative:       Frontal supine radiograph of the chest 7/5/2017 3:10 AM CDT     History: Intubated Patient; R13.12-Dysphagia, oropharyngeal phase;  R56.9-Unspecified convulsions; T78.3XXS-Angioneurotic edema, sequela;  F10.231-Alcohol dependence with withdrawal delirium     Comparison: 07/04/2017      Findings:   Lines and tubes are stable in position. No new opacities or  pneumothoraces are visualized in the chest. The cardiomediastinal  silhouette and pulmonary vascularity are unchanged.       No acute osseous or soft tissue abnormality is noted.        Impression:       Impression:   1. No significant interval change since previous exam.        This report was finalized on 07/05/2017 07:25 by Dr. Dilshad Silver MD.        ECHO Interpretation Summary   · Hyperdyamic LVEF (>70%).  · Left ventricular wall thickness is consistent with borderline concentric hypertrophy.  · Right ventricular cavity is moderately dilated.  · Insufficient TR velocity profile to estimate the right ventricular systolic pressure.  · No significant valvular pathology.           Results from last 7 days  Lab Units 07/05/17  0322   PH,  ARTERIAL pH units 7.414   PO2 ART mm Hg 86.5   PCO2, ARTERIAL mm Hg 43.0   HCO3 ART mmol/L 26.9*   Vent Mode: VC/AC  FiO2 (%):  [30 %-60 %] 30 %  S RR:  [10] 10  PEEP/CPAP (cm H2O):  [5 cm H20-10 cm H20] 5 cm H20  [place] SUP:  [0 cm H20-5 cm H20] 0 cm H20  MAP (cm H2O):  [0-15] 9.6    I have reviewed the patient current medications.     Assessment/Plan   Assessment:   1. Possible angioedema related to lisinopril.  2. Malignant hypertension.  3. Possible new onset seizure disorder versus convulsive syncope.  4. Posterior reversible encephalopathy syndrome on MRI.  5. Tongue bite with sublingual hematoma.  6. Tobacco abuse.  7. Obstructive sleep apnea, noncompliant with device.  8. Gouty arthritis.  9. Daily alcohol use with Delirium tremens requiring sedation and mechanical ventilation.  10. Hypokalemia.       Plan:  He experienced worsening alcohol withdrawal throughout the course the day yesterday.  Ultimately elected to intubate the patient after discussing the case with his daughter.  He requires propofol and as needed Ativan at this point in time.  I like to place a nasogastric tube or a Dobbhoff today and continue scheduled Valium to limit the use of IV Ativan.     Case discussed with Dr. Campos from neurology. He does have findings consistent with PRES on his MRI. He started the patient on Keppra on 7/3. No current need for EEG. Continue to treat the patient's blood pressure conservatively and become more aggressive over the next few days. He has been on and off of Cardene. To be back on metoprolol. Continue to hold his other antihypertensives. Recommend not being placed back on lisinopril.      Ootolaryngology evaluated his tongue hematoma and sublingual hematoma.  I had initially ordered steroids, but they discontinued them at this point in time. We will just watch his progress.      Multivitamin and thiamine replacement. Nicotine replacement.     SCDs for DVT prophylaxis.  We did not use Lovenox because of his  tongue hematoma.     Plan for him to remain ventilated and sedated for at least another 24-48 hours to give his delirium tremens time to subside.    Jairon Perez DO   07/05/17   7:45 AM     Approximately 35 minutes of critical care time were spent managing the patient exclusive of billable procedures. I will continue to manage ventilatory support for now.          Electronically signed by Jairon Perez DO at 7/5/2017  8:03 AM      Jairon Perez DO at 7/5/2017  7:45 AM  Version 1 of 2             [facility] Medicine Services  INPATIENT PROGRESS NOTE    Length of Stay: 1  Date of Admission: 7/3/2017  Primary Care Physician: Linda Hoffman, DNP, APRN    Subjective   Chief Complaint: Alcohol withdrawal  HPI   He required intubation yesterday afternoon.  Please see separate documentation.    He has required max propofol and recurrent benzodiazepine administration throughout the course of the night to calm.  Dr. Campos saw him this morning with the propofol on cause and felt the patient followed commands, but still very agitated and tremulous.    Review of Systems   Unable to assess secondary to the patient's intubated and sedated state.     Objective    Temp:  [98.5 °F (36.9 °C)-98.9 °F (37.2 °C)] 98.5 °F (36.9 °C)  Heart Rate:  [0-103] 67  Resp:  [12-20] 19  BP: (126-181)/() 135/95  FiO2 (%):  [30 %-60 %] 30 %  Physical Exam   Constitutional: No distress.   Seen and discussed with his nurse, Rehana. No family present. He is calm and sedate. ETT secure, PIVs.    HENT:   Head: Normocephalic and atraumatic.   Eyes: Pupils are equal, round, and reactive to light.   Neck: Neck supple. No JVD present.   Cardiovascular: Normal rate and regular rhythm.    Pulmonary/Chest: Effort normal and breath sounds normal.   Ventilated.    Abdominal: Soft. Bowel sounds are normal.   Musculoskeletal: He exhibits edema (trace).   Neurological:   Currently he is sedated with propofol and Ativan.   Dr. Campos examined him and felt him to be appropriate neurologically   Skin: Skin is warm and dry.       Intake/Output Summary (Last 24 hours) at 07/05/17 0756  Last data filed at 07/05/17 0600   Gross per 24 hour   Intake           1117.7 ml   Output             1425 ml   Net           -307.3 ml     Results Review:  I have reviewed the labs, radiology results, and diagnostic studies.    Laboratory Data:     Results from last 7 days  Lab Units 07/05/17  0158 07/03/17  0807   WBC 10*3/mm3 15.95* 11.95*   HEMOGLOBIN g/dL 12.8* 11.9*   HEMATOCRIT % 38.1* 34.1*   PLATELETS 10*3/mm3 246 245       Results from last 7 days  Lab Units 07/05/17  0158 07/04/17  0937 07/03/17  0807   SODIUM mmol/L 143 142 141   POTASSIUM mmol/L 3.7 3.8 3.0*   CHLORIDE mmol/L 105 105 102   CO2 mmol/L 28.0 26.0 29.0   BUN mg/dL 16 12 9   CREATININE mg/dL 1.10 1.13 1.19   CALCIUM mg/dL 9.1 9.1 8.8   BILIRUBIN mg/dL  --   --  0.6   ALK PHOS U/L  --   --  69   ALT (SGPT) U/L  --   --  39   AST (SGOT) U/L  --   --  72*   GLUCOSE mg/dL 96 126* 127*   Magnesium 2.0.     Radiology Data:   Imaging Results (last 24 hours)     Procedure Component Value Units Date/Time    XR Chest 1 View [[id number]] Collected:  07/04/17 1543     Updated:  07/04/17 1548    Narrative:       EXAMINATION: XR CHEST 1 VW- 7/4/2017 14:43 CST     HISTORY: Intubation     Comparison: None     Findings:  Endotracheal tube is in place with tip approximately 3 cm above the  jolly. Low lung volumes are present bilaterally. There is atelectasis  at the left lung base. The lung bases otherwise appear clear. The  pulmonary vasculature appears normal.       Impression:       Impression:  1. Apparent appropriate endotracheal tube position.  2. Left basilar atelectasis.  This report was finalized on 07/04/2017 15:45 by Dr. Driss Ward MD.    XR Chest 1 View [[id number]] Collected:  07/05/17 0724     Updated:  07/05/17 0728    Narrative:       Frontal supine radiograph of the chest 7/5/2017  3:10 AM CDT     History: Intubated Patient; R13.12-Dysphagia, oropharyngeal phase;  R56.9-Unspecified convulsions; T78.3XXS-Angioneurotic edema, sequela;  F10.231-Alcohol dependence with withdrawal delirium     Comparison: 07/04/2017      Findings:   Lines and tubes are stable in position. No new opacities or  pneumothoraces are visualized in the chest. The cardiomediastinal  silhouette and pulmonary vascularity are unchanged.       No acute osseous or soft tissue abnormality is noted.        Impression:       Impression:   1. No significant interval change since previous exam.        This report was finalized on 07/05/2017 07:25 by Dr. Dilshad Silver MD.        ECHO Interpretation Summary   · Hyperdyamic LVEF (>70%).  · Left ventricular wall thickness is consistent with borderline concentric hypertrophy.  · Right ventricular cavity is moderately dilated.  · Insufficient TR velocity profile to estimate the right ventricular systolic pressure.  · No significant valvular pathology.           Results from last 7 days  Lab Units 07/05/17  0322   PH, ARTERIAL pH units 7.414   PO2 ART mm Hg 86.5   PCO2, ARTERIAL mm Hg 43.0   HCO3 ART mmol/L 26.9*   Vent Mode: VC/AC  FiO2 (%):  [30 %-60 %] 30 %  S RR:  [10] 10  PEEP/CPAP (cm H2O):  [5 cm H20-10 cm H20] 5 cm H20  [place] SUP:  [0 cm H20-5 cm H20] 0 cm H20  MAP (cm H2O):  [0-15] 9.6    I have reviewed the patient current medications.     Assessment/Plan   Assessment:   1. Possible angioedema related to lisinopril.  2. Malignant hypertension.  3. Possible new onset seizure disorder versus convulsive syncope.  4. Posterior reversible encephalopathy syndrome on MRI.  5. Tongue bite with sublingual hematoma.  6. Tobacco abuse.  7. Obstructive sleep apnea, noncompliant with device.  8. Gouty arthritis.  9. Daily alcohol use with Delirium tremens requiring sedation and mechanical ventilation.  10. Hypokalemia.       Plan:  He experienced worsening alcohol withdrawal throughout the  course the day yesterday.  Ultimately elected to intubate the patient after discussing the case with his daughter.  He requires propofol and as needed Ativan at this point in time.  I like to place a nasogastric tube or a Dobbhoff today and continue scheduled Valium to limit the use of IV Ativan.     Case discussed with Dr. Campos from neurology. He does have findings consistent with PRES on his MRI. He started the patient on Keppra on 7/3. No current need for EEG. Continue to treat the patient's blood pressure conservatively and become more aggressive over the next few days. He has been on and off of Cardene. To be back on metoprolol. Continue to hold his other antihypertensives. Recommend not being placed back on lisinopril.      Ootolaryngology evaluated his tongue hematoma and sublingual hematoma.  I had initially ordered steroids, but they discontinued them at this point in time. We will just watch his progress.      Multivitamin and thiamine replacement. Nicotine replacement.     SCDs for DVT prophylaxis.     Plan for him to remain ventilated and sedated for at least another 24-48 hours to give his delirium tremens time to subside.    Jairon Perez DO   07/05/17   7:45 AM       Electronically signed by Jairon Perez DO at 7/5/2017  7:58 AM      Jairon Pierson MD at 7/5/2017  6:25 PM  Version 1 of 1         ENT/[facility] ([facility]) Progress Note:       LOS: 1 day   Patient Care Team:  Linda Hoffman DNP, APRDICKSON as PCP - General  Linda Hoffman DNP, APRN as PCP - Family Medicine    Active consulting complaint: Angioedema    Subjective     Interval History:     Status of active consulting problem: Per Rn: tongue swelling decreasing.  Plan is to wean to extubate tomorrow.  Currently on 30% FiO2.  Patient is nonresponsive due to ventilator status.      History taken from: RN    Review of Systems:    Review of Systems   Unable to perform ROS: Intubated       Objective     Vital Signs  Temp:  [97.2 °F (36.2  °C)-98.9 °F (37.2 °C)] 98.1 °F (36.7 °C)  Heart Rate:  [] 71  Resp:  [12-19] 19  BP: (111-178)/() 151/89  FiO2 (%):  [30 %-50 %] 30 %    Physical Exam:   Physical Exam   Constitutional: No distress. He is intubated.   HENT:   Head: Normocephalic and atraumatic.   Right Ear: External ear normal.   Left Ear: External ear normal.   Ecchymotic, slightly swollen tongue.  No bleeding.   Neck: Neck supple.   Pulmonary/Chest: He is intubated.   Lymphadenopathy:     He has no cervical adenopathy.   Skin: He is not diaphoretic.        Results Review:       Lab Results (last 24 hours)     Procedure Component Value Units Date/Time    CBC (No Diff) [[id number]]  (Abnormal) Collected:  07/05/17 0158    Specimen:  Blood Updated:  07/05/17 0213     WBC 15.95 (H) 10*3/mm3      RBC 4.19 (L) 10*6/mm3      Hemoglobin 12.8 (L) g/dL      Hematocrit 38.1 (L) %      MCV 90.9 fL      MCH 30.5 pg      MCHC 33.6 g/dL      RDW 14.7 %      RDW-SD 49.1 fl      MPV 9.6 fL      Platelets 246 10*3/mm3     Blood Gas, Arterial [[id number]]  (Abnormal) Collected:  07/05/17 0224    Specimen:  Arterial Blood Updated:  07/05/17 0227     Site Arterial: right radial     Gera's Test --      Documented in Rapid Comm        pH, Arterial 7.408 pH units      pCO2, Arterial 40.7 mm Hg      pO2, Arterial 101.5 (H) mm Hg      HCO3, Arterial 25.1 mmol/L      Base Excess, Arterial 0.4 mmol/L      O2 Saturation, Arterial 97.7 %      O2 Saturation Calculated 97.7 %      Barometric Pressure for Blood Gas -- mmHg       Component not reported at this site.        Modality Ventilator     FIO2 50 %      Ventilator Mode Assist     Rate 10.0 Breaths/minute      PEEP 5.0     Vent CPAP/PEEP 5.0    Narrative:       Serial Number: [id number]    : [id number]    Basic Metabolic Panel [[id number]]  (Normal) Collected:  07/05/17 0158    Specimen:  Blood Updated:  07/05/17 0229     Glucose 96 mg/dL      BUN 16 mg/dL      Creatinine 1.10 mg/dL      Sodium 143 mmol/L       Potassium 3.7 mmol/L      Chloride 105 mmol/L      CO2 28.0 mmol/L      Calcium 9.1 mg/dL      eGFR Non African Amer 71 mL/min/1.73      BUN/Creatinine Ratio 14.5     Anion Gap 10.0 mmol/L     Narrative:       GFR Normal >60  Chronic Kidney Disease <60  Kidney Failure <15    Blood Gas, Arterial [[id number]]  (Abnormal) Collected:  07/05/17 0322    Specimen:  Arterial Blood Updated:  07/05/17 0326     Site Arterial: right radial     Gera's Test --      Documented in Rapid Comm        pH, Arterial 7.414 pH units      pCO2, Arterial 43.0 mm Hg      pO2, Arterial 86.5 mm Hg      HCO3, Arterial 26.9 (H) mmol/L      Base Excess, Arterial 2.0 mmol/L      O2 Saturation, Arterial 96.6 %      O2 Saturation Calculated 96.6 %      Barometric Pressure for Blood Gas -- mmHg       Component not reported at this site.        Modality Ventilator     FIO2 30 %      Ventilator Mode Assist     Rate 10.0 Breaths/minute      PEEP 5.0     Vent CPAP/PEEP 5.0    Narrative:       Serial Number: [id number]    : [id number]        Imaging Results (last 24 hours)     Procedure Component Value Units Date/Time    XR Chest 1 View [[id number]] Collected:  07/05/17 0724     Updated:  07/05/17 0728    Narrative:       Frontal supine radiograph of the chest 7/5/2017 3:10 AM CDT     History: Intubated Patient; R13.12-Dysphagia, oropharyngeal phase;  R56.9-Unspecified convulsions; T78.3XXS-Angioneurotic edema, sequela;  F10.231-Alcohol dependence with withdrawal delirium     Comparison: 07/04/2017      Findings:   Lines and tubes are stable in position. No new opacities or  pneumothoraces are visualized in the chest. The cardiomediastinal  silhouette and pulmonary vascularity are unchanged.       No acute osseous or soft tissue abnormality is noted.        Impression:       Impression:   1. No significant interval change since previous exam.        This report was finalized on 07/05/2017 07:25 by Dr. Dilshad Silver MD.    XR Abdomen KUB [[id number]]  Collected:  07/05/17 1207     Updated:  07/05/17 1210    Narrative:       EXAMINATION:   XR ABDOMEN KUB-  7/5/2017 12:07 PM CDT     HISTORY: NG tube placement     Single view the abdomen is obtained. Nasogastric tube is present with  the distal tip satisfactorily positioned in the fundus the stomach.  Bowel gas pattern is nonspecific.       Impression:       Satisfactory placement of nasogastric tube  This report was finalized on [id number] by Dr. Adolph Echols MD.          Assessment/Plan     Active Problems:    Angio-edema    Seizures      Plan is to extubate tomorrow.  Tongue swelling down.  Will sign off.  Reconsult if needed.    Jairon Pierson MD  7/5/2017  6:26 PM        Jairon Pierson MD  07/05/17  6:29 PM         Electronically signed by Jairon Pierson MD at 7/5/2017  6:30 PM      Royal Campos MD at 7/6/2017  6:57 AM  Version 1 of 1           Neurology Progress Note      Chief Complaint:  PRES, seizure, EtOH w/d    Subjective     Subjective:    No new issues overnight.  No seizures reported.  Required Cardene off and on but generally SBPs labile, DBPs 70s to 90s, occasional low 100s.  Requiring less Cardene and on Metoprolol.  Remains intubated for now.  Agitation is better but still requiring high levels of propofol.  Needing less Ativan, received one dose earlier in the night.  On Valium scheduled, last dose this am.   Requiring straight cath, 600cc last cath.  Review of Systems:   Could not be completed, sedated, intubated      Objective      Vital Signs  Temp:  [97.2 °F (36.2 °C)-98.6 °F (37 °C)] 98.6 °F (37 °C)  Heart Rate:  [] 87  Resp:  [11-18] 14  BP: (111-178)/() 161/100  FiO2 (%):  [30 %] 30 %    Physical Exam:  Sedated but breaks throught  Intubated  CTAB  RRR  Abdoment Distended  Ext:  2+ edema in feet    Neuro:  Currently on propofol  Opens eyes, tracks and follows voice  Following simple commands  PERRL, Dolls positive, Blinks  Moving all extremities actively  against restraints, no focal weakness noted  W/D to pain throughout     Results Review:    I reviewed the patient's new clinical results.      Results from last 7 days  Lab Units 07/06/17 0222 07/05/17  0158 07/03/17  0807   WBC 10*3/mm3 9.27 15.95* 11.95*   HEMOGLOBIN g/dL 12.1* 12.8* 11.9*   HEMATOCRIT % 35.4* 38.1* 34.1*   PLATELETS 10*3/mm3 216 246 245          Results from last 7 days  Lab Units 07/06/17 0222 07/05/17  0158 07/04/17  0937 07/03/17  0807   SODIUM mmol/L 142 143 142 141   POTASSIUM mmol/L 3.0* 3.7 3.8 3.0*   CHLORIDE mmol/L 108 105 105 102   CO2 mmol/L 27.0 28.0 26.0 29.0   BUN mg/dL 15 16 12 9   CREATININE mg/dL 0.98 1.10 1.13 1.19   CALCIUM mg/dL 8.4 9.1 9.1 8.8   BILIRUBIN mg/dL  --   --   --  0.6   ALK PHOS U/L  --   --   --  69   ALT (SGPT) U/L  --   --   --  39   AST (SGOT) U/L  --   --   --  72*   GLUCOSE mg/dL 87 96 126* 127*        Lab Results   Component Value Date    MG 2.1 07/06/2017     No components found for: POCGLUC  No components found for: A1C  Lab Results   Component Value Date    HDL 36 02/04/2016     No components found for: B12  No results found for: TSH    Assessment/Plan     Hospital Problem List    Active Problems:    Angio-edema    Seizures    Impression  52 year old admitted with seizures, malignant HTN, PRES, now in EtOH w/d.  Currently intubated for airway protection, sedated for agitations.  Neuro exam remains stable.      Plan  1. PRES  --BPs are improving.  Continue to titrate PO meds and wean off Cardene.  I will defer to medicine regarding HTN meds  --Continue Keppra. No evidence of seizures. Will be on this until he has a clear MRI as o/p in 4-6 weeks.  --Plan to repeat imaging when stable or if condition worsens      2. DTs  --Continue Ativan, propofol, wean over the next 1-2 days, no seizures reported.  Vitals more stable on meds  --Will defer timing of extubation per medicine.  No reason from my standpoint not to extubate  --Continue Vitamin Folic Acid,  Thiamine replacement.  --Watch Potassium  --Will need D/C planning and possible rehab.      3. Tongue swelling: ENT has signed off.  Tongue swelling stable.      4.  I had a long discussion with his mother yesterday explaining his current situation.  She understand the nature of PRES and understands that he is withdrawing.  She confirms heavy EtOH use          Royal Campos MD  07/06/17  6:57 AM       Electronically signed by Royal Campos MD at 7/6/2017  7:09 AM      Cristino Encinas DO at 7/6/2017 10:11 AM  Version 1 of 1             [facility] Medicine Services  INPATIENT PROGRESS NOTE    Length of Stay: 2  Date of Admission: 7/3/2017  Primary Care Physician: Linda Hoffman, DNP, APRN    Subjective     Chief Complaint:   Alcohol withdrawal      HPI     The patient is intubated and remains quite agitated and is struggling against 4. restraints despite maximum propofol dosage and scheduled Valium.  I discussed at length with the patient's mother and daughter the plan for extubation today and his continuation of propofol in favor of an aggressive Ativan detoxification protocol.  The patient's previously noted tongue swelling from ACE inhibitor and a self-inflicted bite has resolved.  ENT has signed off the case.  The patient continues to require a Cardene drip for blood pressure control largely secondary to agitation from alcohol withdrawal.  He continues on antiseizure medication.      Review of Systems     Unable to obtain secondary to intubation and sedation  All pertinent negatives and positives are as above. All other systems have been reviewed and are negative unless otherwise stated.     Objective    Temp:  [98.1 °F (36.7 °C)-99.6 °F (37.6 °C)] 99.6 °F (37.6 °C)  Heart Rate:  [] 110  Resp:  [11-18] 14  BP: (111-168)/() 164/114  FiO2 (%):  [30 %] 30 %    Lab Results (last 24 hours)     Procedure Component Value Units Date/Time    CBC (No Diff)  [[id number]]  (Abnormal) Collected:  07/06/17 0222    Specimen:  Blood Updated:  07/06/17 0256     WBC 9.27 10*3/mm3      RBC 3.94 (L) 10*6/mm3      Hemoglobin 12.1 (L) g/dL      Hematocrit 35.4 (L) %      MCV 89.8 fL      MCH 30.7 pg      MCHC 34.2 g/dL      RDW 14.7 %      RDW-SD 48.2 fl      MPV 9.7 fL      Platelets 216 10*3/mm3     Basic Metabolic Panel [[id number]]  (Abnormal) Collected:  07/06/17 0222    Specimen:  Blood Updated:  07/06/17 0257     Glucose 87 mg/dL      BUN 15 mg/dL      Creatinine 0.98 mg/dL      Sodium 142 mmol/L      Potassium 3.0 (L) mmol/L      Chloride 108 mmol/L      CO2 27.0 mmol/L      Calcium 8.4 mg/dL      eGFR Non African Amer 81 mL/min/1.73      BUN/Creatinine Ratio 15.3     Anion Gap 7.0 mmol/L     Narrative:       GFR Normal >60  Chronic Kidney Disease <60  Kidney Failure <15    Magnesium [[id number]]  (Normal) Collected:  07/06/17 0222    Specimen:  Blood Updated:  07/06/17 0257     Magnesium 2.1 mg/dL     Blood Gas, Arterial [[id number]] Collected:  07/06/17 0356    Specimen:  Arterial Blood Updated:  07/06/17 0401     Site Arterial: right radial     Gera's Test --      Documented in Rapid Comm        pH, Arterial 7.430 pH units      pCO2, Arterial 35.9 mm Hg      pO2, Arterial 95.6 mm Hg      HCO3, Arterial 23.3 mmol/L      Base Excess, Arterial -0.5 mmol/L      O2 Saturation, Arterial 97.5 %      O2 Saturation Calculated 97.5 %      Barometric Pressure for Blood Gas -- mmHg       Component not reported at this site.        Modality Ventilator     FIO2 30 %      Ventilator Mode AC     Rate 10.0 Breaths/minute      PEEP 5.0     Vent CPAP/PEEP 5.0    Narrative:       Serial Number: [id number]    : [id number]    POC Glucose Fingerstick [[id number]]  (Normal) Collected:  07/06/17 0738    Specimen:  Blood Updated:  07/06/17 0750     Glucose 104 mg/dL       : [id number] Azalia Gross LMeter ID: [id number]             Imaging Results (last 24 hours)     Procedure Component Value  Units Date/Time    XR Abdomen KUB [[id number]] Collected:  07/05/17 1207     Updated:  07/05/17 1210    Narrative:       EXAMINATION:   XR ABDOMEN KUB-  7/5/2017 12:07 PM CDT     HISTORY: NG tube placement     Single view the abdomen is obtained. Nasogastric tube is present with  the distal tip satisfactorily positioned in the fundus the stomach.  Bowel gas pattern is nonspecific.       Impression:       Satisfactory placement of nasogastric tube  This report was finalized on [id number] by Dr. Adolph Echols MD.    XR Chest 1 View [[id number]] Collected:  07/06/17 0718     Updated:  07/06/17 0722    Narrative:       Frontal supine radiograph of the chest 7/6/2017 3:04 AM CDT     History: Intubated Patient; R13.12-Dysphagia, oropharyngeal phase;  R56.9-Unspecified convulsions; T78.3XXS-Angioneurotic edema, sequela;  F10.231-Alcohol dependence with withdrawal delirium     Comparison: 07/05/2017      Findings:   The endotracheal tube is stable in position. An NG tube has been  advanced into the stomach.. No new opacities or pneumothoraces are  visualized in the chest. The cardiomediastinal silhouette and pulmonary  vascularity are unchanged.       No acute osseous or soft tissue abnormality is noted.        Impression:       Impression:   1. No significant interval change since previous exam.        This report was finalized on 07/06/2017 07:19 by Dr. Dilshad Silver MD.             Intake/Output Summary (Last 24 hours) at 07/06/17 1011  Last data filed at 07/06/17 0400   Gross per 24 hour   Intake          2211.26 ml   Output             1150 ml   Net          1061.26 ml       Physical Exam    Constitutional: No distress.   Seen and discussed with his nurse, Bao. No family present. He is agitated and pulling against restraints. ETT secure, PIVs.    HENT:   Head: Normocephalic and atraumatic.   Eyes: Pupils are equal, round, and reactive to light.   Neck: Neck supple. No JVD present.   Cardiovascular: Normal rate  and regular rhythm.   Pulmonary/Chest: Effort normal and breath sounds normal.   Ventilated.    Abdominal: Soft. Bowel sounds are normal.   Musculoskeletal: He exhibits edema (trace).   Neurological:   Currently he is sedated with propofol and Ativan. Dr. Campos examined him and felt him to be appropriate neurologically.   Skin: Skin is warm and dry.       Results Review:  I have reviewed the labs, radiology results, and diagnostic studies since my last progress note and made treatment changes reflective of the results.   I have reviewed the current medications.    Assessment/Plan     Hospital Problem List     Angio-edema    Seizures      Assessment:   1. Possible angioedema related to lisinopril.  2. Malignant hypertension.  3. Possible new onset seizure disorder versus convulsive syncope.  4. Posterior reversible encephalopathy syndrome on MRI.  5. Tongue bite with sublingual hematoma.  6. Tobacco abuse.  7. Obstructive sleep apnea, noncompliant with device.  8. Gouty arthritis.  9. Daily alcohol use with Delirium tremens requiring sedation and mechanical ventilation.  10. Hypokalemia.     PLAN:  Discontinue scheduled valium in favor of alcohol detoxification order set with liberal use of IV or oral Ativan.  Extubate today.  Add amlodipine 5 mg daily per G tube and hopefully can wean Cardene.   Discontinue nicotine patch    Cristino Encinas DO   07/06/17   10:11 AM     Approximately 40 minutes of critical care time were spent managing the patient exclusive of billable procedures.          Electronically signed by Cristino Encinas DO at 7/6/2017 11:27 AM      Royal Campos MD at 7/7/2017  7:27 AM  Version 1 of 1           Neurology Progress Note      Chief Complaint:  PRES, EtOH w/d, Seizure    Subjective     Subjective:  Overnight, he remained agitations at times.  Now on Ativan as well as Diprovan.   Fighting against restraints.  BPS have been better overnight, DBPs 80s-90s.  Not following commands  when more awake.  Now has C.diff.  To unstable to extubate yesterday.    Review of Systems:   Unable to obtain      Objective      Vital Signs  Temp:  [97.5 °F (36.4 °C)-100.7 °F (38.2 °C)] 100.5 °F (38.1 °C)  Heart Rate:  [] 90  Resp:  [11-20] 11  BP: (111-178)/() 133/74  FiO2 (%):  [30 %] 30 %    Physical Exam:  Sedated, Intubated  NC/AT, tongue swelling persists  CTAB  RRR  Abdomen distended  2+ edema in the legs, 1+ in the hands  Skin warm and dry    Neuro:  Not responsive.  Tries to open eyes to loud stimulus, painful stimuli  CN:  PERRL, Positive Doll's, Positive Blink  No w/d to pain X 4  No spontaneous movment     Results Review:    I reviewed the patient's new clinical results.      Results from last 7 days  Lab Units 07/06/17  0222 07/05/17  0158 07/03/17  0807   WBC 10*3/mm3 9.27 15.95* 11.95*   HEMOGLOBIN g/dL 12.1* 12.8* 11.9*   HEMATOCRIT % 35.4* 38.1* 34.1*   PLATELETS 10*3/mm3 216 246 245          Results from last 7 days  Lab Units 07/07/17  0204 07/06/17  0222 07/05/17  0158  07/03/17  0807   SODIUM mmol/L 142 142 143  < > 141   POTASSIUM mmol/L 3.2* 3.0* 3.7  < > 3.0*   CHLORIDE mmol/L 111* 108 105  < > 102   CO2 mmol/L 23.0* 27.0 28.0  < > 29.0   BUN mg/dL 14 15 16  < > 9   CREATININE mg/dL 1.05 0.98 1.10  < > 1.19   CALCIUM mg/dL 8.0* 8.4 9.1  < > 8.8   BILIRUBIN mg/dL  --   --   --   --  0.6   ALK PHOS U/L  --   --   --   --  69   ALT (SGPT) U/L  --   --   --   --  39   AST (SGOT) U/L  --   --   --   --  72*   GLUCOSE mg/dL 119* 87 96  < > 127*   < > = values in this interval not displayed.     Lab Results   Component Value Date    MG 2.1 07/06/2017     No components found for: POCGLUC  No components found for: A1C  Lab Results   Component Value Date    HDL 36 02/04/2016     No components found for: B12  No results found for: TSH    Assessment/Plan     Hospital Problem List    Active Problems:    Angio-edema    Seizures    Impression  52 year old admitted with seizures, malignant  HTN, PRES, EtOH w/d. Agitation, intubated, sedated      Plan  1. PRES  --BPS are better   --Medicine attempting to wean cardene with additional of BP meds   --No overt seizure activity   --EEG today to rule out status   --Increase Keppra to 1000mg IV BID      2. DTs  --On Ativan, sedated  --Intubated  --On Thiamine, FA      3. Tongue swelling: ENT has signed off. Currently intubated, more difficult to assess.  Angioedema +/- tongue biting from seizure      At this point, I suspect multiple issues are contributing to his AMS including PRES and W/D.  Will check EEG today to rule out status although on high dose propofol, Ativan and Keppra, I doubt.  I will increase Keppra to 1000mg to cover empirically.  Will follow closely.          Royal Campos MD  07/07/17  7:27 AM       Electronically signed by Royal Campos MD at 7/7/2017  7:36 AM      Indra Gallo MD at 7/7/2017  8:37 AM  Version 2 of 2             PULMONARY AND CRITICAL CARE PROGRESS NOTE - [facility]    Patient: Nishant Savage    1965    MR# [id number]    Acct# [id number]  07/07/17   8:39 AM  Referring Provider: Cristino Encinas DO    Chief Complaint: Mechanically ventilated    Interval history: Remains on vent, stirring around with Diprivan infusing. He's getting ready to have an EEG done at bedside. ABG's and CXR stable. No overnight events reported. No family at bedside this morning.     Meds:    albuterol 2.5 mg Nebulization Q6H - RT   amLODIPine 5 mg Per G Tube Q24H   chlorhexidine 15 mL Mouth/Throat Q12H   famotidine 20 mg Intravenous Q12H   levETIRAcetam 1,000 mg Intravenous Q12H   metoprolol tartrate 50 mg Nasogastric Q12H   metroNIDAZOLE 500 mg Intravenous Q8H   IV Fluids 1000 mL + additives 100 mL/hr Intravenous Daily   thiamine (VITAMIN B1) IVPB 100 mg Intravenous Daily       niCARdipine 5-15 mg/hr Last Rate: 2.5 mg/hr (07/07/17 0205)   propofol 5-50 mcg/kg/min Last Rate: 75 mcg/kg/min (07/07/17 0601)          Review of Systems:   Cannot obtain due to mechanical ventilation.  The patient notably is critically ill and connected to a ventilator.  As such patient cannot communicate and provide any history whatsoever, including any history of present illness or interval history since arrival or review of systems. The interested reviewer may note this fact, as an attempt has been made at collecting and documenting these portions of the patient history, but this information is unobtainable despite attempted review and therefore cannot be documented at this time.     Ventilator Settings:  Vent Mode: VC/AC  Vt (Set, L): 0.65 L  Resp Rate (Set): 10  Pressure Support (cm H2O): 0 cm H20  FiO2 (%): 30 %  PEEP/CPAP (cm H2O): 5 cm H20  Minute Ventilation (L/min) (Obs): 14.5 L/min  Resp Rate (Observed) Vent: 20  I:E Ratio (Set): 1:1.20  I:E Ratio (Obs): 1:1.5  PIP Observed (cm H2O): 26 cm H2O  RSBI: 202    Physical Exam:  Temp:  [97.5 °F (36.4 °C)-100.7 °F (38.2 °C)] 100.6 °F (38.1 °C)  Heart Rate:  [] 105  Resp:  [11-20] 11  BP: (111-178)/() 155/84  FiO2 (%):  [30 %] 30 %  Intake/Output Summary (Last 24 hours) at 07/07/17 0839  Last data filed at 07/07/17 0400   Gross per 24 hour   Intake          4036.35 ml   Output                0 ml   Net          4036.35 ml     SpO2 Readings from Last 3 Encounters:   07/07/17 97%   09/30/16 98%   02/04/16 96%     Physical Exam   Gen.: Sedated and intubated on mechanical ventilatory support.  HEENT: Endotracheal tube is in place. Tongue is swollen and bruised.  Neck: Supple.  Respiratory: Fair air movement bilaterally with coarse sounds in upper lobes.   Cardiac: Sinus tach. No murmur or gallop noted.   Abdomen: Soft, distended, bowel sounds present with nutrition infusing.   Neurologic: He is stirring around some.   Psychiatric: Cannot assess as he is sedated and intubated.  Dermatologic: No rash noted.  Extremities: SCDs are in place.  Musculoskeletal: No joint deformities  noted.  Lymphatic: No adenopathy palpated    Results from last 7 days  Lab Units 07/06/17  0222 07/05/17  0158 07/03/17  0807   WBC 10*3/mm3 9.27 15.95* 11.95*   HEMOGLOBIN g/dL 12.1* 12.8* 11.9*   PLATELETS 10*3/mm3 216 246 245       Results from last 7 days  Lab Units 07/07/17  0204 07/06/17  0222 07/05/17  0158   SODIUM mmol/L 142 142 143   POTASSIUM mmol/L 3.2* 3.0* 3.7   BUN mg/dL 14 15 16   CREATININE mg/dL 1.05 0.98 1.10       Results from last 7 days  Lab Units 07/07/17  0339 07/06/17  0356 07/05/17  0322   PH, ARTERIAL pH units 7.434 7.430 7.414   PCO2, ARTERIAL mm Hg 31.5* 35.9 43.0   PO2 ART mm Hg 76.4* 95.6 86.5   FIO2 % 30 30 30        Recent films:  Imaging Results (last 24 hours)     Procedure Component Value Units Date/Time    XR Chest 1 View [[id number]] Collected:  07/07/17 0708     Updated:  07/07/17 0712    Narrative:       EXAMINATION:   XR CHEST 1 VW-  7/7/2017 7:08 AM CDT     HISTORY: Intubated patient     Single view the chest obtained. The lungs are clear. Cardiac silhouettes  mildly enlarged. Endotracheal tube nasogastric tube are satisfactorily  position.     This compared prior study 07/06/2017.     Obdulia and stable single view chest  This report was finalized on 07/07/2017 07:09 by Dr. Adolph Echols MD.        Films reviewed personally by me.  My interpretation: Stable from previous    Pulmonary Assessment:  1. Respiratory failure related to agitation from ethanol withdrawal.  2. Recent problems with angioedema which have resolved  3. History of chronic ethanol use  4. C-diff  5. Tongue hematoma       Recommend:   · Continue vent support until neurological issues are determined. Will defer that determination to Dr. Campos.  · Continue nebs  · Ativan PRN for withdrawal    Electronically signed by EPI Lincoln on 7/7/2017 at 8:39 AM    Physician substantive contribution:  Pertinent symptoms/interval history include: He remains sedated and intubated on mechanical ventilatory  support  Respiratory exam shows pertinent findings of clear lung fields on chest exam  Plan includes: We will assess him regarding ventilator weaning when his neurologic status hopefully permits.  I have seen and examined patient personally, performing a face-to-face diagnostic evaluation with plan of care reviewed and developed with APRN and nursing staff. I have addended and/or modified the above history of present illness, physical examination, and assessment and plan to reflect my findings and impressions. Essential elements of the care plan were discussed with APRN above.  Agree with findings and assessment/plan as documented above.    Electronically signed by Indra Gallo MD, on 7/7/2017, 5:05 PM           Electronically signed by Indra Gallo MD at 7/7/2017  5:05 PM      EIP Lincoln at 7/7/2017  8:37 AM  Version 1 of 2             PULMONARY AND CRITICAL CARE PROGRESS NOTE - [facility]    Patient: Nishant Savage    1965    MR# [id number]    Acct# [id number]  07/07/17   8:39 AM  Referring Provider: Cristino Encinas DO    Chief Complaint: Mechanically ventilated    Interval history: Remains on vent, stirring around with Diprivan infusing. He's getting ready to have an EEG done at bedside. ABG's and CXR stable. No overnight events reported. No family at bedside this morning.     Meds:    albuterol 2.5 mg Nebulization Q6H - RT   amLODIPine 5 mg Per G Tube Q24H   chlorhexidine 15 mL Mouth/Throat Q12H   famotidine 20 mg Intravenous Q12H   levETIRAcetam 1,000 mg Intravenous Q12H   metoprolol tartrate 50 mg Nasogastric Q12H   metroNIDAZOLE 500 mg Intravenous Q8H   IV Fluids 1000 mL + additives 100 mL/hr Intravenous Daily   thiamine (VITAMIN B1) IVPB 100 mg Intravenous Daily       niCARdipine 5-15 mg/hr Last Rate: 2.5 mg/hr (07/07/17 0205)   propofol 5-50 mcg/kg/min Last Rate: 75 mcg/kg/min (07/07/17 0601)     Review of Systems:   Cannot obtain due to mechanical  ventilation.  The patient notably is critically ill and connected to a ventilator.  As such patient cannot communicate and provide any history whatsoever, including any history of present illness or interval history since arrival or review of systems. The interested reviewer may note this fact, as an attempt has been made at collecting and documenting these portions of the patient history, but this information is unobtainable despite attempted review and therefore cannot be documented at this time.     Ventilator Settings:  Vent Mode: VC/AC  Vt (Set, L): 0.65 L  Resp Rate (Set): 10  Pressure Support (cm H2O): 0 cm H20  FiO2 (%): 30 %  PEEP/CPAP (cm H2O): 5 cm H20  Minute Ventilation (L/min) (Obs): 14.5 L/min  Resp Rate (Observed) Vent: 20  I:E Ratio (Set): 1:1.20  I:E Ratio (Obs): 1:1.5  PIP Observed (cm H2O): 26 cm H2O  RSBI: 202    Physical Exam:  Temp:  [97.5 °F (36.4 °C)-100.7 °F (38.2 °C)] 100.6 °F (38.1 °C)  Heart Rate:  [] 105  Resp:  [11-20] 11  BP: (111-178)/() 155/84  FiO2 (%):  [30 %] 30 %  Intake/Output Summary (Last 24 hours) at 07/07/17 0839  Last data filed at 07/07/17 0400   Gross per 24 hour   Intake          4036.35 ml   Output                0 ml   Net          4036.35 ml     SpO2 Readings from Last 3 Encounters:   07/07/17 97%   09/30/16 98%   02/04/16 96%     Physical Exam   Gen.: Sedated and intubated on mechanical ventilatory support.  HEENT: Endotracheal tube is in place. Tongue is swollen and bruised.  Neck: Supple.  Respiratory: Fair air movement bilaterally with coarse sounds in upper lobes.   Cardiac: Sinus tach. No murmur or gallop noted.   Abdomen: Soft, distended, bowel sounds present with nutrition infusing.   Neurologic: He is stirring around some.   Psychiatric: Cannot assess as he is sedated and intubated.  Dermatologic: No rash noted.  Extremities: SCDs are in place.  Musculoskeletal: No joint deformities noted.  Lymphatic: No adenopathy palpated    Results from last 7  days  Lab Units 07/06/17  0222 07/05/17  0158 07/03/17  0807   WBC 10*3/mm3 9.27 15.95* 11.95*   HEMOGLOBIN g/dL 12.1* 12.8* 11.9*   PLATELETS 10*3/mm3 216 246 245       Results from last 7 days  Lab Units 07/07/17  0204 07/06/17  0222 07/05/17  0158   SODIUM mmol/L 142 142 143   POTASSIUM mmol/L 3.2* 3.0* 3.7   BUN mg/dL 14 15 16   CREATININE mg/dL 1.05 0.98 1.10       Results from last 7 days  Lab Units 07/07/17  0339 07/06/17  0356 07/05/17  0322   PH, ARTERIAL pH units 7.434 7.430 7.414   PCO2, ARTERIAL mm Hg 31.5* 35.9 43.0   PO2 ART mm Hg 76.4* 95.6 86.5   FIO2 % 30 30 30        Recent films:  Imaging Results (last 24 hours)     Procedure Component Value Units Date/Time    XR Chest 1 View [[id number]] Collected:  07/07/17 0708     Updated:  07/07/17 0712    Narrative:       EXAMINATION:   XR CHEST 1 VW-  7/7/2017 7:08 AM CDT     HISTORY: Intubated patient     Single view the chest obtained. The lungs are clear. Cardiac silhouettes  mildly enlarged. Endotracheal tube nasogastric tube are satisfactorily  position.     This compared prior study 07/06/2017.     Obdulia and stable single view chest  This report was finalized on 07/07/2017 07:09 by Dr. Adolph Echols MD.        Films reviewed personally by me.  My interpretation: Stable from previous    Pulmonary Assessment:  6. Respiratory failure related to agitation from ethanol withdrawal.  7. Recent problems with angioedema which have resolved  8. History of chronic ethanol use  9. C-diff  10. Tongue hematoma       Recommend:   · Continue vent support until neurological issues are determined. Will defer that determination to Dr. Campos.  · Continue nebs  · Ativan PRN for withdrawal    Electronically signed by EPI Lincoln on 7/7/2017 at 8:39 AM         Electronically signed by EPI Lincoln at 7/7/2017  8:52 AM      Cristino Encinas DO at 7/7/2017 11:06 AM  Version 1 of 1             [facility]  Medicine Services  INPATIENT PROGRESS NOTE    Length of Stay: 3  Date of Admission: 7/3/2017  Primary Care Physician: Linda Hoffman, DNP, APRN    Subjective     Chief Complaint:     The patient is intubated and sedated    HPI     The patient developed recurrent diarrhea yesterday.  GI panel shows PCR positive for Clostridium difficile.  He is currently on metronidazole IV.  Temperature is elevated to 100.6.  Blood pressure is stable.  The patient failed to extubate yesterday.  Pulmonology and neurology continues to follow.  EEG is pending.  ABGs and chest x-ray is stable.  Dr. Campos and I discussed the patient's case together this morning.  Pulmonology recommends continuing ventilator until neurological issues are improved.  The patient remains on a Cardene drip for blood pressure control however it has been weaned down to a tiny dose.  Hopefully can discontinue the drip soon.      Review of Systems     Unable to obtain secondary to intubation and sedation  All pertinent negatives and positives are as above. All other systems have been reviewed and are negative unless otherwise stated.     Objective    Temp:  [97.5 °F (36.4 °C)-100.7 °F (38.2 °C)] 100.6 °F (38.1 °C)  Heart Rate:  [] 92  Resp:  [11-20] 11  BP: (111-175)/() 129/74  FiO2 (%):  [30 %] 30 %    Lab Results (last 24 hours)     Procedure Component Value Units Date/Time    Clostridium Difficile Toxin, PCR [[id number]] Collected:  07/06/17 1844    Specimen:  Stool from Per Rectum Updated:  07/06/17 1852    Gastrointestinal Panel, PCR [[id number]]  (Abnormal) Collected:  07/06/17 2327    Specimen:  Stool from Per Rectum Updated:  07/07/17 0124     Campylobacter Not Detected     Clostridium difficile (toxin A/B) Detected (A)        Due to the high asymptomatic carriage rates, especially in young children, the clinical relevance of the detection of toxigenic C. difficile from stool should be considered in the context of other clinical findings,  patient age, and risk factors including hospitalization and antibiotic exposure.        Plesiomonas shigelloides Not Detected     Salmonella Not Detected     Vibrio Not Detected     Vibrio cholerae Not Detected     Yersinia enterocolitica Not Detected     Enteroaggregative E. coli (EAEC) Not Detected     Enteropathogenic E. coli (EPEC) Not Detected     Enterotoxigenic E. coli (ETEC) lt/st Not Detected     Shiga-like toxin-producing E. coli (STEC) stx1/stx2 Not Detected     E. coli O157 Not Detected     Shigella/Enteroinvasive E. coli (EIEC) Not Detected     Cryptosporidium Not Detected     Cyclospora cayetanensis Not Detected     Entamoeba histolytica Not Detected     Giardia lamblia Not Detected     Adenovirus F40/41 Not Detected     Astrovirus Not Detected     Norovirus GI/GII Not Detected     Rotavirus A Not Detected     Sapovirus (I, II, IV or V) Not Detected    Basic Metabolic Panel [[id number]]  (Abnormal) Collected:  07/07/17 0204    Specimen:  Blood Updated:  07/07/17 0317     Glucose 119 (H) mg/dL      BUN 14 mg/dL      Creatinine 1.05 mg/dL      Sodium 142 mmol/L      Potassium 3.2 (L) mmol/L      Chloride 111 (H) mmol/L      CO2 23.0 (L) mmol/L      Calcium 8.0 (L) mg/dL      eGFR Non African Amer 74 mL/min/1.73      BUN/Creatinine Ratio 13.3     Anion Gap 8.0 mmol/L     Narrative:       GFR Normal >60  Chronic Kidney Disease <60  Kidney Failure <15    Blood Gas, Arterial [[id number]]  (Abnormal) Collected:  07/07/17 0339    Specimen:  Arterial Blood Updated:  07/07/17 0343     Site Arterial: right radial     Gera's Test --      Documented in Rapid Comm        pH, Arterial 7.434 pH units      pCO2, Arterial 31.5 (L) mm Hg      pO2, Arterial 76.4 (L) mm Hg      HCO3, Arterial 20.6 (L) mmol/L      Base Excess, Arterial -2.5 (L) mmol/L      O2 Saturation, Arterial 95.8 %      O2 Saturation Calculated 95.8 %      Barometric Pressure for Blood Gas -- mmHg       Component not reported at this site.         Modality Ventilator     FIO2 30 %      Ventilator Mode AC     Rate 10.0 Breaths/minute      PEEP 5.0     Vent CPAP/PEEP 5.0    Narrative:       Serial Number: [id number]    : [id number]          Imaging Results (last 24 hours)     Procedure Component Value Units Date/Time    XR Chest 1 View [[id number]] Collected:  07/07/17 0708     Updated:  07/07/17 0712    Narrative:       EXAMINATION:   XR CHEST 1 VW-  7/7/2017 7:08 AM CDT     HISTORY: Intubated patient     Single view the chest obtained. The lungs are clear. Cardiac silhouettes  mildly enlarged. Endotracheal tube nasogastric tube are satisfactorily  position.     This compared prior study 07/06/2017.     Obdulia and stable single view chest  This report was finalized on 07/07/2017 07:09 by Dr. Adolph Echols MD.             Intake/Output Summary (Last 24 hours) at 07/07/17 1107  Last data filed at 07/07/17 0400   Gross per 24 hour   Intake          4036.35 ml   Output                0 ml   Net          4036.35 ml       Physical Exam    Constitutional: No distress.   Seen and discussed with his nurse, Bao. No family present. He Responds to Ativan sedation well and is no longer pulling at tubes and restraints.. ETT secure, PIVs.    HENT:   Head: Normocephalic and atraumatic.   Eyes: Pupils are equal, round, and reactive to light.   Neck: Neck supple. No JVD present.   Cardiovascular: Normal rate and regular rhythm.   Pulmonary/Chest: Effort normal and breath sounds normal.   Ventilated.    Abdominal: Soft. Bowel sounds are normal.   Musculoskeletal: He exhibits edema (trace).   Neurological:   Currently he is sedated with propofol and Ativan. Dr. Campos and I discussed his case.  He continues to become very agitated when sedation is tapered.   Skin: Skin is warm and dry.          Results Review:  I have reviewed the labs, radiology results, and diagnostic studies since my last progress note and made treatment changes reflective of the results.   I have reviewed the  current medications.    Assessment/Plan     Hospital Problem List     Angio-edema    Seizures      1. Possible angioedema related to lisinopril.  2. Malignant hypertension.  3. Possible new onset seizure disorder versus convulsive syncope.  4. Posterior reversible encephalopathy syndrome on MRI.  5. Tongue bite with sublingual hematoma.  6. Tobacco abuse.  7. Obstructive sleep apnea, noncompliant with device.  8. Gouty arthritis.  9. Daily alcohol use with Delirium tremens requiring sedation and mechanical ventilation.  10. Hypokalemia.   11. C. Difficile colitis    PLAN:  Continue current alcohol withdrawal protocol  Continue Flagyl IV  Increase amlodipine to 10 mg daily     Cristino Encinas DO   07/07/17   11:07 AM     Approximately 40 minutes of critical care time were spent managing the patient exclusive of billable procedures.          Electronically signed by Cristino Encinas DO at 7/7/2017 11:29 AM      Royal Campos MD at 7/8/2017  7:37 AM  Version 1 of 1           Neurology Progress Note      Chief Complaint:  PRES, heavy EtOH use, severe W/D    Subjective     Subjective:    Overnight, lost his IV lines, central line placed.  He was off Propofol for about an hour and was extremely agitated.  Reportedly opened his eyes but did not follow any commands.  Was moving all extremities at that time.  BPs in the 170s-180s/90s at that time but overall, BPs have been more stable.  DIarrhea.  C.Diff positive, on Flagyl.  Tongue swelling remains.  EEG showed no status.  Very slow throughout        Review of Systems:   Could not obtain      Objective      Vital Signs  Temp:  [98.8 °F (37.1 °C)-99.8 °F (37.7 °C)] 98.8 °F (37.1 °C)  Heart Rate:  [] 93  Resp:  [18-21] 18  BP: (117-182)/() 160/87  FiO2 (%):  [30 %] 30 %    Physical Exam:  Intubated, sedated.  Tongue remains swollen  CTAB  RRR  Abdomen distended  2+ edema in the hands and feet    Neuro:  On max Propofol and Ativan Q 2 hours  Not  awakening to loud voice or sternal rub  Eyes closed.  Pupils pinpoint, negative Doll's, negative gag  No w/d to painful stim  No spontaneous movment  DTRs absent  Equivocal Babinski     Results Review:    I reviewed the patient's new clinical results.      Results from last 7 days  Lab Units 07/06/17  0222 07/05/17  0158 07/03/17  0807   WBC 10*3/mm3 9.27 15.95* 11.95*   HEMOGLOBIN g/dL 12.1* 12.8* 11.9*   HEMATOCRIT % 35.4* 38.1* 34.1*   PLATELETS 10*3/mm3 216 246 245          Results from last 7 days  Lab Units 07/08/17  0411 07/07/17  0204 07/06/17  0222  07/03/17  0807   SODIUM mmol/L 143 142 142  < > 141   POTASSIUM mmol/L 3.5 3.2* 3.0*  < > 3.0*   CHLORIDE mmol/L 111* 111* 108  < > 102   CO2 mmol/L 21.0* 23.0* 27.0  < > 29.0   BUN mg/dL 11 14 15  < > 9   CREATININE mg/dL 0.98 1.05 0.98  < > 1.19   CALCIUM mg/dL 8.0* 8.0* 8.4  < > 8.8   BILIRUBIN mg/dL  --   --   --   --  0.6   ALK PHOS U/L  --   --   --   --  69   ALT (SGPT) U/L  --   --   --   --  39   AST (SGOT) U/L  --   --   --   --  72*   GLUCOSE mg/dL 131* 119* 87  < > 127*   < > = values in this interval not displayed.     Lab Results   Component Value Date    MG 2.1 07/06/2017     No components found for: POCGLUC  No components found for: A1C  Lab Results   Component Value Date    HDL 36 02/04/2016     No components found for: B12  No results found for: TSH    Assessment/Plan     Hospital Problem List    Active Problems:    Angio-edema    Seizures    Impression  52 year old admitted with seizures, malignant HTN, PRES, EtOH w/d. Agitation, intubated, sedated, C.diff, low grade temps      Plan  1. PRES  --BPS are better.   Remains on Cardene but pressures are improved overall  --No overt seizure activity  --EEG showed no evidence of status.    --On Keppra to 1000mg IV BID      2. DTs  --On Ativan, Propofol  --Intubated  --On Thiamine, FA  --Wean as tolerated    3.  C.diff  --likely source of fevers  --On Flagyl  --Will attempt LP today, although my  suspicion for CNS infection at this point is low.      4. Tongue swelling: Appears about the same.  Intubated currently     Neuro exam is extremely difficult given the high doses of Propofol and Ativan required to keep him sedated.  Will continue to follow closely.        Royal Campos MD  07/08/17  7:38 AM       Electronically signed by Royal Campos MD at 7/8/2017  7:48 AM      Cristino Encinas DO at 7/8/2017  9:57 AM  Version 1 of 1             [facility] Medicine Services  INPATIENT PROGRESS NOTE    Length of Stay: 4  Date of Admission: 7/3/2017  Primary Care Physician: Linda Hoffman, DNP, APRN    Subjective     Chief Complaint:     The patient is intubated and sedated    HPI     The patient remains febrile and he has developed a purulent oral discharge.  Patient's tongue edema continues to increase as well.   CBC for today remains pending.  A fecal management system has been initiated and his diarrhea is slowing.  ABGs look good.  The patient still becomes very agitated with Diprivan taper.  Metabolic panel is essentially normal.  I will obtain a CT scan of the head and neck assessing for infection or abscess.  Lumbar puncture is in progress per Dr. Campos.  We discussed the case together and will initiate empiric therapy for meningitis with Rocephin and vancomycin.  Both of those agents should also cover anaerobes for lingual or sublingual abscess.   ENT has signed off his case but I will ask them to return for reassessment and recommendations.  Peripheral IVs infiltrated last night and general surgery has inserted a central line.  LUE swelling and induration noted and venous ultrasound shows evidence of basilic vein thrombus.  DVT prophylaxis had been held on admission because of the sublingual hematoma and will be initiated today.      Review of Systems   Unable to obtain secondary to intubation and sedation  All pertinent negatives and positives are as  above. All other systems have been reviewed and are negative unless otherwise stated.     Objective    Temp:  [98.8 °F (37.1 °C)-99.8 °F (37.7 °C)] 98.8 °F (37.1 °C)  Heart Rate:  [] 77  Resp:  [16-21] 16  BP: (117-182)/() 155/89  FiO2 (%):  [30 %] 30 %    Lab Results (last 24 hours)     Procedure Component Value Units Date/Time    Clostridium Difficile Toxin, PCR [[id number]]  (Abnormal) Collected:  07/06/17 1844    Specimen:  Stool from Per Rectum Updated:  07/07/17 1348     C. Difficile Toxins by PCR Positive (A)    Blood Gas, Arterial [[id number]]  (Abnormal) Collected:  07/08/17 0214    Specimen:  Arterial Blood Updated:  07/08/17 0217     Site Arterial: right radial     Gera's Test --      Documented in Rapid Comm        pH, Arterial 7.422 pH units      pCO2, Arterial 35.8 mm Hg      pO2, Arterial 71.1 (L) mm Hg      HCO3, Arterial 22.8 mmol/L      Base Excess, Arterial -1.1 mmol/L      O2 Saturation, Arterial 94.7 %      O2 Saturation Calculated 94.7 %      Barometric Pressure for Blood Gas -- mmHg       Component not reported at this site.        Modality Ventilator     FIO2 30 %      Ventilator Mode Assist     Rate 10.0 Breaths/minute      PEEP 5.0     Vent CPAP/PEEP 5.0    Narrative:       Serial Number: [id number]    : [id number]    Respiratory Culture [[id number]] Collected:  07/08/17 0403    Specimen:  Sputum from NT Suction Updated:  07/08/17 0407    Basic Metabolic Panel [[id number]]  (Abnormal) Collected:  07/08/17 0411    Specimen:  Blood Updated:  07/08/17 0451     Glucose 131 (H) mg/dL      BUN 11 mg/dL      Creatinine 0.98 mg/dL      Sodium 143 mmol/L      Potassium 3.5 mmol/L      Chloride 111 (H) mmol/L      CO2 21.0 (L) mmol/L      Calcium 8.0 (L) mg/dL      eGFR Non African Amer 81 mL/min/1.73      BUN/Creatinine Ratio 11.2     Anion Gap 11.0 mmol/L     Narrative:       GFR Normal >60  Chronic Kidney Disease <60  Kidney Failure <15          Imaging Results (last 24 hours)      Procedure Component Value Units Date/Time    XR Chest 1 View [[id number]] Collected:  07/07/17 2137     Updated:  07/07/17 2153    Narrative:       EXAMINATION: XR CHEST 1 VW- 7/7/2017 9:31 PM CDT     HISTORY: Central line placement.     COMPARISON: 07/07/2017.     FINDINGS:  The left subclavian central line has been placed with tip in the  proximal SVC. No pneumothorax is identified. The endotracheal and  enteric tubes remain in place. Scattered areas of subsegmental  atelectasis are present. The heart is magnified but felt to be mildly  enlarged. The pulmonary vasculature is stable.       Impression:       Interval placement of left subclavian central line without  evidence of pneumothorax.  This report was finalized on 07/07/2017 21:50 by Dr. Driss Ward MD.    US Venous Doppler Upper Extremity Left (duplex) [[id number]] Collected:  07/08/17 0719     Updated:  07/08/17 0723    Narrative:       EXAMINATION:   US VENOUS DOPPLER UPPER EXTREMITY LEFT (DUPLEX)-   7/8/2017 7:19 AM CDT     HISTORY: Venous Doppler analysis of the left upper extremity.     Irineo vein demonstrates compression and color flow.     Left subclavian vein demonstrates color flow and augmentation.     Axillary vein demonstrates compression color flow and augmentation.     The brachial vein demonstrates color flow and compression. The left  basilic vein demonstrates lack of compression above the elbow. This lack  of compression is consistent with thrombus in the basilic vein. The  cephalic vein demonstrates color flow compression. The radial vein  demonstrates color flow compression. The ulnar vein demonstrates color  flow compression.       Impression:       Thrombus is visualized in the left basilic vein above the  elbow.  This report was finalized on 07/08/2017 07:20 by Dr. Adolph Echols MD.    US Arterial Doppler Upper Extremity Left [[id number]] Collected:  07/08/17 0720     Updated:  07/08/17 0725    Narrative:       EXAMINATION:   US  ARTERIAL DOPPLER UPPER EXTREMITY  LEFT-  7/8/2017 7:20  AM CDT     HISTORY: Left upper extremity duplex arterial analysis     On this examination the left subclavian artery demonstrates color flow  and peak systolic velocity 45 cm/s. Axillary artery demonstrates color  flow with a peak systolic velocity 104 cm/s. The brachial artery  demonstrates peak systolic velocity 130 cm/s with triphasic waveform.  The radial artery demonstrates color flow and triphasic waveform peak  systolic velocity 78 cm/s. The ulnar artery demonstrates triphasic  waveform color flow and peak systolic velocity 67 cm/s.       Impression:       Negative left upper extremity arterial duplex ultrasound  This report was finalized on 07/08/2017 07:22 by Dr. Adolph Echols MD.    XR Chest 1 View [[id number]] Collected:  07/08/17 0846     Updated:  07/08/17 0935    Narrative:       EXAMINATION:   XR CHEST 1 VW-  7/8/2017 8:45 AM CDT     HISTORY: Frontal supine radiograph of the chest 7/8/2017 3:22 AM CDT     COMPARISON: 07/07/2017.     FINDINGS:   The lungs are clear. The cardiomediastinal silhouette and pulmonary  vascularity are within normal limits. Endotracheal tube is  satisfactorily positioned. Left subclavian catheter is unchanged.     The osseous structures and surrounding soft tissues demonstrate no acute  abnormality.       Impression:       1. No radiographic evidence of acute cardiopulmonary process.     This report was finalized on 07/08/2017 09:31 by Dr. Adolph Echols MD.             Intake/Output Summary (Last 24 hours) at 07/08/17 0957  Last data filed at 07/08/17 0400   Gross per 24 hour   Intake           3240.8 ml   Output             1550 ml   Net           1690.8 ml       Physical Exam    Constitutional: No distress.   Seen and discussed with his nurse, Bao. No family present. He responds to Ativan plus Diprivan sedation reasonably well. ETT secure, Central line present.    HENT:   Head: Normocephalic and atraumatic.   Eyes:  Pupils are equal, round, and reactive to light.  The patient's tongue continues to swell and there is malodoruos purulent drainage noted from the oral cavity.  Neck: Neck supple. No JVD present.   Cardiovascular: Normal rate and regular rhythm.   Pulmonary/Chest: Effort normal and breath sounds normal.   Ventilated.    Abdominal: Soft. Bowel sounds are normal.   Musculoskeletal: He exhibits edema (trace).   Neurological:   Currently he is sedated with propofol and Ativan. Dr. Campos and I discussed his case. He continues to become very agitated when sedation is tapered and is still not appropriate for extubation because of his lingual swelling.  Dr. Campos is attempting a lumbar puncture at present and requested to initiate treatment empirically for possible meningitis.  Skin: Skin is warm and dry.       Results Review:  I have reviewed the labs, radiology results, and diagnostic studies since my last progress note and made treatment changes reflective of the results.   I have reviewed the current medications.    Assessment/Plan     Hospital Problem List     Angio-edema    Seizures           1. Possible angioedema related to lisinopril.  2. Malignant hypertension.  3. Possible new onset seizure disorder versus convulsive syncope.  4. Posterior reversible encephalopathy syndrome on MRI.  5. Tongue bite with sublingual hematoma.  6. Tobacco abuse.  7. Obstructive sleep apnea, noncompliant with device.  8. Gouty arthritis.  9. Daily alcohol use with Delirium tremens requiring sedation and mechanical ventilation.  10. Hypokalemia.   11. C. Difficile colitilis  12. Lingual or sublingual infection or abcess      PLAN:    Rocephin 2gm IV q 12h  Vancomycin 1 g every 12 hours with pharmacy to dose thereafter  CT scan head and neck with and without contrast  Reconsult ENT  VTE prophylaxis with Lovenox    Cristino Encinas DO   07/08/17   9:57 AM     Approximately 40 minutes of critical care time were spent managing the patient  exclusive of billable procedures.        Electronically signed by Cristino Encinas DO at 7/8/2017 10:31 AM      Indra Gallo MD at 7/8/2017 10:14 AM  Version 2 of 2             PULMONARY AND CRITICAL CARE PROGRESS NOTE - [facility]    Patient: Nishant Savage    1965    MR# [id number]    Acct# [id number]  07/08/17   10:14 AM  Referring Provider: Cristino Encinas DO    Chief Complaint: Mechanically ventilated    Interval history: Remains on ventilator with propofol gtt infusing.  Cardene is currently on hold.  RN reports he was off propofol for about an hour 2' to IV lines infiltrating and the patient was very agitated and would not follow commands.  RN also reports patient has required and average of ativan 4mg IV 4-5x daily.  Green secretions noted per nursing, respiratory culture pending.  Currently FiO2 30% with O2 sat 93%.  No other aggravating or allevitating factors.           Review of Systems:   Cannot obtain due to mechanical ventilation.  The patient notably is critically ill and connected to a ventilator.  As such patient cannot communicate and provide any history whatsoever, including any history of present illness or interval history since arrival or review of systems. The interested reviewer may note this fact, as an attempt has been made at collecting and documenting these portions of the patient history, but this information is unobtainable despite attempted review and therefore cannot be documented at this time.     Ventilator Settings:  Vent Mode: VC/AC  Vt (Set, L): 0.65 L  Resp Rate (Set): 10  Pressure Support (cm H2O): 0 cm H20  FiO2 (%): 30 %  PEEP/CPAP (cm H2O): 5 cm H20  Minute Ventilation (L/min) (Obs): 9.06 L/min  Resp Rate (Observed) Vent: 14  I:E Ratio (Set): 1:1.90  I:E Ratio (Obs): 1:1.5  PIP Observed (cm H2O): 34 cm H2O  RSBI: 202    Physical Exam:  Temp:  [98.8 °F (37.1 °C)-99.8 °F (37.7 °C)] 98.8 °F (37.1 °C)  Heart Rate:  [] 77  Resp:  [16-21]  16  BP: (117-182)/() 155/89  FiO2 (%):  [30 %] 30 %    Intake/Output Summary (Last 24 hours) at 07/08/17 1014  Last data filed at 07/08/17 0400   Gross per 24 hour   Intake           3240.8 ml   Output             1550 ml   Net           1690.8 ml     SpO2 Readings from Last 3 Encounters:   07/08/17 96%   09/30/16 98%   02/04/16 96%     Physical Exam   Gen.: Sedated and intubated on mechanical ventilatory support.  HEENT: Endotracheal tube is in place. Tongue is swollen and bruised.  Neck: Supple.  Respiratory: Fair air movement bilaterally, no wheezing or rhonchi.   Cardiac: Sinus rhythm. No murmur or gallop noted.   Abdomen: Soft, distended, bowel sounds present with nutrition infusing.   Neurologic: He is stirring around some when touched.   Psychiatric: Cannot assess as he is sedated and intubated.  Dermatologic: No rash noted.  Extremities: SCDs are in place.  Musculoskeletal: No joint deformities noted.  Lymphatic: No adenopathy palpated    Results from last 7 days  Lab Units 07/06/17  0222 07/05/17  0158 07/03/17  0807   WBC 10*3/mm3 9.27 15.95* 11.95*   HEMOGLOBIN g/dL 12.1* 12.8* 11.9*   PLATELETS 10*3/mm3 216 246 245       Results from last 7 days  Lab Units 07/08/17  0411 07/07/17  0204 07/06/17  0222   SODIUM mmol/L 143 142 142   POTASSIUM mmol/L 3.5 3.2* 3.0*   BUN mg/dL 11 14 15   CREATININE mg/dL 0.98 1.05 0.98       Results from last 7 days  Lab Units 07/08/17  0214 07/07/17  0339 07/06/17  0356   PH, ARTERIAL pH units 7.422 7.434 7.430   PCO2, ARTERIAL mm Hg 35.8 31.5* 35.9   PO2 ART mm Hg 71.1* 76.4* 95.6   FIO2 % 30 30 30        Recent films:  Imaging Results (last 24 hours)     Procedure Component Value Units Date/Time    XR Chest 1 View [[id number]] Collected:  07/07/17 2137     Updated:  07/07/17 2153    Narrative:       EXAMINATION: XR CHEST 1 VW- 7/7/2017 9:31 PM CDT     HISTORY: Central line placement.     COMPARISON: 07/07/2017.     FINDINGS:  The left subclavian central line has been  placed with tip in the  proximal SVC. No pneumothorax is identified. The endotracheal and  enteric tubes remain in place. Scattered areas of subsegmental  atelectasis are present. The heart is magnified but felt to be mildly  enlarged. The pulmonary vasculature is stable.       Impression:       Interval placement of left subclavian central line without  evidence of pneumothorax.  This report was finalized on 07/07/2017 21:50 by Dr. Driss Ward MD.    US Venous Doppler Upper Extremity Left (duplex) [[id number]] Collected:  07/08/17 0719     Updated:  07/08/17 0723    Narrative:       EXAMINATION:   US VENOUS DOPPLER UPPER EXTREMITY LEFT (DUPLEX)-   7/8/2017 7:19 AM CDT     HISTORY: Venous Doppler analysis of the left upper extremity.     Irineo vein demonstrates compression and color flow.     Left subclavian vein demonstrates color flow and augmentation.     Axillary vein demonstrates compression color flow and augmentation.     The brachial vein demonstrates color flow and compression. The left  basilic vein demonstrates lack of compression above the elbow. This lack  of compression is consistent with thrombus in the basilic vein. The  cephalic vein demonstrates color flow compression. The radial vein  demonstrates color flow compression. The ulnar vein demonstrates color  flow compression.       Impression:       Thrombus is visualized in the left basilic vein above the  elbow.  This report was finalized on 07/08/2017 07:20 by Dr. Adolph Echols MD.    US Arterial Doppler Upper Extremity Left [[id number]] Collected:  07/08/17 0720     Updated:  07/08/17 0725    Narrative:       EXAMINATION:   US ARTERIAL DOPPLER UPPER EXTREMITY  LEFT-  7/8/2017 7:20  AM CDT     HISTORY: Left upper extremity duplex arterial analysis     On this examination the left subclavian artery demonstrates color flow  and peak systolic velocity 45 cm/s. Axillary artery demonstrates color  flow with a peak systolic velocity 104 cm/s. The  brachial artery  demonstrates peak systolic velocity 130 cm/s with triphasic waveform.  The radial artery demonstrates color flow and triphasic waveform peak  systolic velocity 78 cm/s. The ulnar artery demonstrates triphasic  waveform color flow and peak systolic velocity 67 cm/s.       Impression:       Negative left upper extremity arterial duplex ultrasound  This report was finalized on 07/08/2017 07:22 by Dr. Adolph Echols MD.    XR Chest 1 View [[id number]] Collected:  07/08/17 0846     Updated:  07/08/17 0935    Narrative:       EXAMINATION:   XR CHEST 1 VW-  7/8/2017 8:45 AM CDT     HISTORY: Frontal supine radiograph of the chest 7/8/2017 3:22 AM CDT     COMPARISON: 07/07/2017.     FINDINGS:   The lungs are clear. The cardiomediastinal silhouette and pulmonary  vascularity are within normal limits. Endotracheal tube is  satisfactorily positioned. Left subclavian catheter is unchanged.     The osseous structures and surrounding soft tissues demonstrate no acute  abnormality.       Impression:       1. No radiographic evidence of acute cardiopulmonary process.     This report was finalized on 07/08/2017 09:31 by Dr. Adolph Echols MD.        Films reviewed personally by me.  My interpretation: Stable from previous    Pulmonary Assessment:  11. Respiratory failure related to agitation from ethanol withdrawal.  12. Malignant HTN   13. PRES   14. Recent problems with angioedema which have resolved  15. History of chronic ethanol use  16. C-diff  17. Tongue hematoma     Recommend:   · Continue current mechanical ventilation.  Not candidate for weaning trials 2' to neuro status.   · Continues to require high doses of propofol and ativan  · Respiratory culture pending 2' green secretions  · Lumbar puncture today per nicole     Electronically signed by EPI Manzo on 7/8/2017 at 10:14 AM    Physician substantive contribution:  Pertinent symptoms/interval history include: His sedation has been weaned down  yesterday but then sedation had to be completely interrupted temporarily due to lack of IV access and once IV access was reestablished he subsequently required higher levels of sedation.  Respiratory exam shows pertinent findings of clear fields on exam  Plan includes: Continue ventilatory support.  I have seen and examined patient personally, performing a face-to-face diagnostic evaluation with plan of care reviewed and developed with APRN and nursing staff. I have addended and/or modified the above history of present illness, physical examination, and assessment and plan to reflect my findings and impressions. Essential elements of the care plan were discussed with APRN above.  Agree with findings and assessment/plan as documented above.    Electronically signed by Indra Gallo MD, on 7/8/2017, 12:46 PM           Electronically signed by Indra Gallo MD at 7/8/2017 12:47 PM      Trinity Medina APRN at 7/8/2017 10:14 AM  Version 1 of 2             PULMONARY AND CRITICAL CARE PROGRESS NOTE - [facility]    Patient: Nishant Savage    1965    MR# [id number]    Acct# [id number]  07/08/17   10:14 AM  Referring Provider: Cristino Encinas DO    Chief Complaint: Mechanically ventilated    Interval history: Remains on ventilator with propofol gtt infusing.  Cardene is currently on hold.  RN reports he was off propofol for about an hour 2' to IV lines infiltrating and the patient was very agitated and would not follow commands.  RN also reports patient has required and average of ativan 4mg IV 4-5x daily.  Green secretions noted per nursing, respiratory culture pending.  Currently FiO2 30% with O2 sat 93%.  No other aggravating or allevitating factors.           Review of Systems:   Cannot obtain due to mechanical ventilation.  The patient notably is critically ill and connected to a ventilator.  As such patient cannot communicate and provide any history whatsoever, including any  history of present illness or interval history since arrival or review of systems. The interested reviewer may note this fact, as an attempt has been made at collecting and documenting these portions of the patient history, but this information is unobtainable despite attempted review and therefore cannot be documented at this time.     Ventilator Settings:  Vent Mode: VC/AC  Vt (Set, L): 0.65 L  Resp Rate (Set): 10  Pressure Support (cm H2O): 0 cm H20  FiO2 (%): 30 %  PEEP/CPAP (cm H2O): 5 cm H20  Minute Ventilation (L/min) (Obs): 9.06 L/min  Resp Rate (Observed) Vent: 14  I:E Ratio (Set): 1:1.90  I:E Ratio (Obs): 1:1.5  PIP Observed (cm H2O): 34 cm H2O  RSBI: 202    Physical Exam:  Temp:  [98.8 °F (37.1 °C)-99.8 °F (37.7 °C)] 98.8 °F (37.1 °C)  Heart Rate:  [] 77  Resp:  [16-21] 16  BP: (117-182)/() 155/89  FiO2 (%):  [30 %] 30 %    Intake/Output Summary (Last 24 hours) at 07/08/17 1014  Last data filed at 07/08/17 0400   Gross per 24 hour   Intake           3240.8 ml   Output             1550 ml   Net           1690.8 ml     SpO2 Readings from Last 3 Encounters:   07/08/17 96%   09/30/16 98%   02/04/16 96%     Physical Exam   Gen.: Sedated and intubated on mechanical ventilatory support.  HEENT: Endotracheal tube is in place. Tongue is swollen and bruised.  Neck: Supple.  Respiratory: Fair air movement bilaterally, no wheezing or rhonchi.   Cardiac: Sinus rhythm. No murmur or gallop noted.   Abdomen: Soft, distended, bowel sounds present with nutrition infusing.   Neurologic: He is stirring around some when touched.   Psychiatric: Cannot assess as he is sedated and intubated.  Dermatologic: No rash noted.  Extremities: SCDs are in place.  Musculoskeletal: No joint deformities noted.  Lymphatic: No adenopathy palpated    Results from last 7 days  Lab Units 07/06/17  0222 07/05/17  0158 07/03/17  0807   WBC 10*3/mm3 9.27 15.95* 11.95*   HEMOGLOBIN g/dL 12.1* 12.8* 11.9*   PLATELETS 10*3/mm3 216 564 241         Results from last 7 days  Lab Units 07/08/17  0411 07/07/17  0204 07/06/17  0222   SODIUM mmol/L 143 142 142   POTASSIUM mmol/L 3.5 3.2* 3.0*   BUN mg/dL 11 14 15   CREATININE mg/dL 0.98 1.05 0.98       Results from last 7 days  Lab Units 07/08/17  0214 07/07/17  0339 07/06/17  0356   PH, ARTERIAL pH units 7.422 7.434 7.430   PCO2, ARTERIAL mm Hg 35.8 31.5* 35.9   PO2 ART mm Hg 71.1* 76.4* 95.6   FIO2 % 30 30 30     Pulmonary Assessment:  18. Respiratory failure related to agitation from ethanol withdrawal.  19. Malignant HTN   20. PRES   21. Recent problems with angioedema which have resolved  22. History of chronic ethanol use  23. C-diff  24. Tongue hematoma     Recommend:   · Continue current mechanical ventilation.  Not candidate for weaning trials 2' to neuro status.   · Continues to require high doses of propofol and ativan  · Respiratory culture pending 2' green secretions  · Lumbar puncture today per richuo     Electronically signed by EPI Manzo on 7/8/2017 at 10:14 AM         Electronically signed by EPI Manzo at 7/8/2017 10:33 AM      Royal Campos MD at 7/9/2017  7:26 AM  Version 1 of 1           Neurology Progress Note      Chief Complaint:  Seizure, Malignant HTN, PRES, EtOH w/d, Respiratory failure on Vent    Subjective     Subjective:    He remains on max dose Propofol with prn Ativan.  With that said, he started waking up overnight.  Opened eyes and following commands.  Continues to have loose stools but improving.  Off Cardene all night and blood pressures were generally controlled, although the more awake he is the more agitated he tends to get and the higher his blood pressure will go.  No seizure activity reported    Review of Systems:   Could not obtain      Objective      Vital Signs  Temp:  [98.4 °F (36.9 °C)-99 °F (37.2 °C)] 99 °F (37.2 °C)  Heart Rate:  [] 95  Resp:  [14-22] 20  BP: (110-191)/() 186/101  FiO2 (%):  [30 %] 30  %    Physical Exam:  Heavily sedated  NC/AT, tongue remains swollen  Sclera anicteric  Coarse BS bilaterally  RRR  Abdomen distended  2+ edema in feet and hands    Neuro:  Awakens to loud voice and pain.  Opens eyes  --Squeezes hand and wiggles toes  --Will track some with eyes  --PERRL  --Positive Blink  --Moving all extremities, more so off sedation  --No w/d to pain currently  --DTRs absent throughout, equivocal Babinksi     Results Review:    I reviewed the patient's new clinical results.      Results from last 7 days  Lab Units 07/08/17  1057 07/06/17  0222 07/05/17  0158   WBC 10*3/mm3 12.30* 9.27 15.95*   HEMOGLOBIN g/dL 10.4* 12.1* 12.8*   HEMATOCRIT % 31.5* 35.4* 38.1*   PLATELETS 10*3/mm3 199 216 246          Results from last 7 days  Lab Units 07/09/17  0348 07/08/17  0411 07/07/17  0204  07/03/17  0807   SODIUM mmol/L 143 143 142  < > 141   POTASSIUM mmol/L 3.7 3.5 3.2*  < > 3.0*   CHLORIDE mmol/L 111* 111* 111*  < > 102   CO2 mmol/L 22.0* 21.0* 23.0*  < > 29.0   BUN mg/dL 11 11 14  < > 9   CREATININE mg/dL 0.91 0.98 1.05  < > 1.19   CALCIUM mg/dL 8.0* 8.0* 8.0*  < > 8.8   BILIRUBIN mg/dL  --   --   --   --  0.6   ALK PHOS U/L  --   --   --   --  69   ALT (SGPT) U/L  --   --   --   --  39   AST (SGOT) U/L  --   --   --   --  72*   GLUCOSE mg/dL 108* 131* 119*  < > 127*   < > = values in this interval not displayed.     Lab Results   Component Value Date    MG 2.1 07/06/2017     No components found for: POCGLUC  No components found for: A1C  Lab Results   Component Value Date    HDL 36 02/04/2016     No components found for: B12  No results found for: TSH    Assessment/Plan     Hospital Problem List    Active Problems:    Angio-edema    Seizures    Impression  52 year old admitted with seizures, malignant HTN, PRES, EtOH w/d. Agitation, intubated, sedated, C.diff, low grade temps      Plan  1. PRES  --BPS generally are more stable.  Off Cardene currently but use as needed to maintain more normalized BPS,  DBP < 100  --No overt seizure activity reported  --EEG showed no evidence of status. --On Keppra to 1000mg IV BID.  Will remain on this until MRI normal, recheck in 6 weeks  --Attempted LP yesterday for fevers and continue AMS.  After 2 passes stopped as patient on Lovenox.  Started empiric coverage for now although doubt CNS infection  --Overall, some improvement this morning      2. DTs  --On Ativan, Propofol  --Intubated  --On Thiamine, FA  --Wean as tolerated     3. C.diff  --likely source of fevers  --On Flagyl      4. Tongue swelling: Appears about the same. Intubated currently    5.  GI:  Jevitiy 1.2 at 50/hr TF    6.  Resp:  Intubated, on Vent      More optimistic this morning with exam.   Will follow with serial exams.      Royal Campos MD  07/09/17  7:26 AM       Electronically signed by Royal Campos MD at 7/9/2017  7:36 AM      EPI Armenta at 7/9/2017 10:08 AM  Version 1 of 1         ENT/[facility] ([facility]) Progress Note:       LOS: 5 days   Patient Care Team:  Linda Hoffman DNP, APRN as PCP - General  Linda Hoffman DNP, APRN as PCP - Family Medicine    Active consulting complaint: Tongue swelling per nurses    Subjective     Interval History:     Status of active consulting problem: Tongue swelling/angioedema    History taken from: patient RN    Review of Systems:    Review of Systems  Tongue swelling per nurse  Objective     Vital Signs  Temp:  [98.4 °F (36.9 °C)-100 °F (37.8 °C)] 100 °F (37.8 °C)  Heart Rate:  [] 99  Resp:  [14-24] 24  BP: (110-191)/() 163/95  FiO2 (%):  [30 %] 30 %    Physical Exam:   Physical Exam     CONSTITUTIONAL: well nourished sedate on ventilator     COMMUNICATION AND VOICE: sedated    HEAD: normocephalic, no lesions, atraumatic, no tenderness, no masses     FACE: appearance normal, no lesions, no tenderness, no deformities, facial motion symmetric    SALIVARY GLANDS: parotid glands with no tenderness, no swelling, no masses, submandibular  glands with normal size, nontender    EYES: eyelids normal, orbits normal, no proptosis, conjunctiva normal , pupils equal, round     EARS:  Hearing: response to conversational voice normal bilaterally   External Ears: auricles without lesions      NOSE:  External Nose: structure normal, no tenderness on palpation, no nasal discharge, no lesions, no evidence of trauma, nostrils patent   Intranasal Exam: nasal mucosa normal, vestibule within normal limits, inferior turbinate normal, nasal septum midline       ORAL:  Lips: upper and lower lips without lesion   Teeth: dentition within normal limits for age   Gums: gingivae healthy   Oral Mucosa: oral mucosa normal, no mucosal lesions   Floor of Mouth: Warthin’s duct patent, mucosa normal  Tongue: tongue edema with left ventral/lingual ulcerations (resting bite into tongue)  Palate: unable to visualize posterior pharynx  Oropharynx: oropharyngeal mucosa normal    NECK: neck appearance normal, no mass,  noted without erythema or tenderness    THYROID: no overt thyromegaly, no tenderness, nodules or mass present on palpation, position midline     LYMPH NODES: no lymphadenopathy    CHEST/RESPIRATORY: respiratory effort normal - per vent    CARDIOVASCULAR: rate and rhythm normal, extremities without cyanosis or edema      NEUROLOGIC/PSYCHIATRIC:sedate on diprivan (agitated off of sedation)    Results Review:       Lab Results (last 24 hours)     Procedure Component Value Units Date/Time    CBC & Differential [[id number]] Collected:  07/08/17 1057    Specimen:  Blood Updated:  07/08/17 1118    Narrative:       The following orders were created for panel order CBC & Differential.  Procedure                               Abnormality         Status                     ---------                               -----------         ------                     CBC Auto Differential[[id number]]        Abnormal            Final result                 Please view results for these tests on  the individual orders.    CBC Auto Differential [[id number]]  (Abnormal) Collected:  07/08/17 1057    Specimen:  Blood from Arm, Right Updated:  07/08/17 1118     WBC 12.30 (H) 10*3/mm3      RBC 3.49 (L) 10*6/mm3      Hemoglobin 10.4 (L) g/dL      Hematocrit 31.5 (L) %      MCV 90.3 fL      MCH 29.8 pg      MCHC 33.0 g/dL      RDW 14.6 %      RDW-SD 48.3 fl      MPV 9.8 fL      Platelets 199 10*3/mm3      Neutrophil % 71.1 %      Lymphocyte % 11.7 (L) %      Monocyte % 13.4 (H) %      Eosinophil % 2.6 %      Basophil % 0.2 %      Immature Grans % 1.0 %      Neutrophils, Absolute 8.75 (H) 10*3/mm3      Lymphocytes, Absolute 1.44 10*3/mm3      Monocytes, Absolute 1.65 (H) 10*3/mm3      Eosinophils, Absolute 0.32 10*3/mm3      Basophils, Absolute 0.02 10*3/mm3      Immature Grans, Absolute 0.12 (H) 10*3/mm3     Blood Gas, Arterial [[id number]] Collected:  07/09/17 0159    Specimen:  Arterial Blood Updated:  07/09/17 0203     Site Arterial: right radial     Gera's Test --      Documented in Rapid Comm        pH, Arterial 7.417 pH units      pCO2, Arterial 36.3 mm Hg      pO2, Arterial 80.8 mm Hg      HCO3, Arterial 22.9 mmol/L      Base Excess, Arterial -1.1 mmol/L      O2 Saturation, Arterial 96.2 %      O2 Saturation Calculated 96.2 %      Barometric Pressure for Blood Gas -- mmHg       Component not reported at this site.        Modality Ventilator     FIO2 30 %      Ventilator Mode Assist     Rate 10.0 Breaths/minute      PEEP 5.0     Vent CPAP/PEEP 5.0    Narrative:       Serial Number: [id number]    : [id number]    Basic Metabolic Panel [[id number]]  (Abnormal) Collected:  07/09/17 0348    Specimen:  Blood Updated:  07/09/17 0406     Glucose 108 (H) mg/dL      BUN 11 mg/dL      Creatinine 0.91 mg/dL      Sodium 143 mmol/L      Potassium 3.7 mmol/L      Chloride 111 (H) mmol/L      CO2 22.0 (L) mmol/L      Calcium 8.0 (L) mg/dL      eGFR Non African Amer 88 mL/min/1.73      BUN/Creatinine Ratio 12.1     Anion Gap 10.0  mmol/L     Narrative:       GFR Normal >60  Chronic Kidney Disease <60  Kidney Failure <15    Respiratory Culture [[id number]]  (Abnormal) Collected:  07/08/17 0403    Specimen:  Sputum from NT Suction Updated:  07/09/17 0830     Respiratory Culture Heavy growth (4+) Staphylococcus aureus (A)     BETA LACTAMASE Positive     Gram Stain Result Greater than 25 WBCs per low power field      Many (4+) Mixed gram positive evelio        Imaging Results (last 24 hours)     Procedure Component Value Units Date/Time    CT Head With & Without Contrast [[id number]] Collected:  07/08/17 1233     Updated:  07/08/17 1239    Narrative:       CT BRAIN without and with contrast dated 7/8/2017 11:43 AM CDT     HISTORY: Possible sublingual abscess     COMPARISON: None      DOSE LENGTH PRODUCT: 1779 mGy cm     In order to have a CT radiation dose as low as reasonably achievable,  Automated Exposure Control was utilized for adjustment of the mA and/or  KV according to patient size.     TECHNIQUE: Serial axial tomographic images of the brain were obtained  without and with the use of intravenous contrast.      FINDINGS:   The midline structures are nondisplaced. The ventricles and basilar  cisterns are normal in size and configuration. There is no evidence of  intracranial hemorrhage or mass-effect. The gray-white matter  differentiation is maintained. The sulci have a normal configuration,  and there are no abnormal extra-axial fluid collections. The structures  of the posterior fossa are unremarkable.      The included orbits and their contents are unremarkable.    . The  visualized osseous structures and overlying soft tissues of the skull  and face are unremarkable.      There is no abnormal enhancement.       Impression:       1. Negative CT brain without and with contrast        This report was finalized on 07/08/2017 12:36 by Dr. Adolph Echols MD.    CT Soft Tissue Neck With & Without Contrast [[id number]] Collected:  07/08/17 1258      Updated:  07/08/17 1307    Narrative:       CT SOFT TISSUE NECK W WO CONTRAST- 7/8/2017 11:43 AM CDT     HISTORY: Possible sublingual or ligual abcess; R13.12-Dysphagia,  oropharyngeal phase; R56.9-Unspecified convulsions;  T78.3XXS-Angioneurotic edema, sequela; F10.231-Alcohol dependence with  withdrawal delirium      COMPARISON: NONE      DOSE LENGTH PRODUCT: 602 mGy cm. Automated exposure control was also  utilized to decrease patient radiation dose.     TECHNIQUE: Serial helical tomographic images of the neck were obtained  in the standard fashion following the intravenous administration of  Isovue contrast.       FINDINGS:    Orbits, paranasal sinuses, and skull base: Normal     Nasopharynx: A nasogastric tube is present. Fluid is present in the  right maxillary antrum mucosal thickening is present left maxillary  antrum     Suprahyoid neck: Endotracheal tube is present. Pleural cavities obscured  by dental work. The base the tongue appears symmetric as visualized.  Submandibular glands are visualized. Sternomastoids sternocleidomastoid  muscles are symmetric.     Infrahyoid neck: Normal larynx, hypopharynx and supraglottis.     Thyroid: Normal.     Thoracic inlet: Pleural thickening is noted in the apex of the right  chest.  Lymph nodes: Normal. No lymphadenopathy.     Vascular structures: Normal.     Bones: Degenerative disc disease and degenerative spondylosis is present  in the cervical spine.     Other findings: None.       Impression:       1. No acute abnormalities identified on the enhanced CT soft tissue the  neck.  2. Nasogastric tube and endotracheal tube are present. Artifact from  dental work is noted.  3. Fluid in the right maxillary antrum is present  4. Degenerative spondylosis in the cervical spine        This report was finalized on 07/08/2017 13:04 by Dr. Adolph Echols MD.    XR Chest 1 View [[id number]] Collected:  07/09/17 0729     Updated:  07/09/17 0733    Narrative:       EXAMINATION:    XR CHEST 1 VW-  7/9/2017 7:29 AM CDT     HISTORY: Patient intubated     Single view the chest obtained. Atelectasis right lung base is noted.  Left lung is clear. Cardiac silhouettes mildly enlarged. Nasogastric  tube and endotracheal tube are satisfactorily position.       Impression:       Mild atelectasis right lung base slightly increased since  July 8  This report was finalized on 07/09/2017 07:30 by Dr. Adolph Echols MD.          Assessment/Plan     Active Problems:    Angio-edema    Seizures  Acute respiratory failure/airway compromise  Tongue ulcerations    Plan:  Continue good oral hygiene, peridex.  Do not feel that extubation is safe until tongue edema is improved.  Tracheostomy may be necessary until improvement to wean from ventilator.  Discuss with Dr. Pierson.  Discuss with Dr. Encinas to add steroids.            EPI Márquez  07/09/17  10:09 AM       Electronically signed by EPI Armenta at 7/9/2017 10:16 AM      Cristino Encinas, DO at 7/9/2017 10:11 AM  Version 1 of 1             [facility] Medicine Services  INPATIENT PROGRESS NOTE    Length of Stay: 5  Date of Admission: 7/3/2017  Primary Care Physician: Linda Hoffman, DNP, APRN    Subjective     Chief Complaint:     The patient is intubated and sedated    HPI     Able to follow commands when off sedation.  Less agitated off sedation now. Purulent sputum and oral secretions still present.  Tongue swelling still persistent. The patient continues to bite down on his tongue was continuously. Urine is dark. His Padron is draining well.   He continues to require maximum dose propofol and intermittent Ativan.        Review of Systems   Unable to obtain secondary to intubation and sedation  All pertinent negatives and positives are as above. All other systems have been reviewed and are negative unless otherwise stated.     Objective    Temp:  [98.4 °F (36.9 °C)-100 °F (37.8 °C)] 100 °F (37.8 °C)  Heart  Rate:  [] 99  Resp:  [14-24] 24  BP: (110-191)/() 163/95  FiO2 (%):  [30 %] 30 %     Specimen:  Blood from Arm, Right Updated:  07/08/17 1118     WBC 12.30 (H) 10*3/mm3      RBC 3.49 (L) 10*6/mm3      Hemoglobin 10.4 (L) g/dL      Hematocrit 31.5 (L) %      MCV 90.3 fL      MCH 29.8 pg      MCHC 33.0 g/dL      RDW 14.6 %      RDW-SD 48.3 fl      MPV 9.8 fL      Platelets 199 10*3/mm3      Neutrophil % 71.1 %      Lymphocyte % 11.7 (L) %      Monocyte % 13.4 (H) %      Eosinophil % 2.6 %      Basophil % 0.2 %      Immature Grans % 1.0 %      Neutrophils, Absolute 8.75 (H) 10*3/mm3      Lymphocytes, Absolute 1.44 10*3/mm3      Monocytes, Absolute 1.65 (H) 10*3/mm3      Eosinophils, Absolute 0.32 10*3/mm3      Basophils, Absolute 0.02 10*3/mm3      Immature Grans, Absolute 0.12 (H) 10*3/mm3     Blood Gas, Arterial [[id number]] Collected:  07/09/17 0159    Specimen:  Arterial Blood Updated:  07/09/17 0203     Site Arterial: right radial     Gera's Test --      Documented in Rapid Comm        pH, Arterial 7.417 pH units      pCO2, Arterial 36.3 mm Hg      pO2, Arterial 80.8 mm Hg      HCO3, Arterial 22.9 mmol/L      Base Excess, Arterial -1.1 mmol/L      O2 Saturation, Arterial 96.2 %      O2 Saturation Calculated 96.2 %      Barometric Pressure for Blood Gas -- mmHg       Component not reported at this site.        Modality Ventilator     FIO2 30 %      Ventilator Mode Assist     Rate 10.0 Breaths/minute      PEEP 5.0     Vent CPAP/PEEP 5.0    Narrative:       Serial Number: [id number]    : [id number]    Basic Metabolic Panel [[id number]]  (Abnormal) Collected:  07/09/17 0348    Specimen:  Blood Updated:  07/09/17 0406     Glucose 108 (H) mg/dL      BUN 11 mg/dL      Creatinine 0.91 mg/dL      Sodium 143 mmol/L      Potassium 3.7 mmol/L      Chloride 111 (H) mmol/L      CO2 22.0 (L) mmol/L      Calcium 8.0 (L) mg/dL      eGFR Non African Amer 88 mL/min/1.73      BUN/Creatinine Ratio 12.1     Anion Gap 10.0  mmol/L     Respiratory Culture [[id number]]  (Abnormal) Collected:  07/08/17 0403    Specimen:  Sputum from NT Suction Updated:  07/09/17 0830     Respiratory Culture Heavy growth (4+) Staphylococcus aureus (A)     BETA LACTAMASE Positive     Gram Stain Result Greater than 25 WBCs per low power field      Many (4+) Mixed gram positive evelio          Imaging Results (last 24 hours)     Procedure Component Value Units Date/Time    CT Head With & Without Contrast [[id number]] Collected:  07/08/17 1233     Updated:  07/08/17 1239    Narrative:       CT BRAIN without and with contrast dated 7/8/2017 11:43 AM CDT     HISTORY: Possible sublingual abscess     COMPARISON: None      DOSE LENGTH PRODUCT: 1779 mGy cm     In order to have a CT radiation dose as low as reasonably achievable,  Automated Exposure Control was utilized for adjustment of the mA and/or  KV according to patient size.     TECHNIQUE: Serial axial tomographic images of the brain were obtained  without and with the use of intravenous contrast.      FINDINGS:   The midline structures are nondisplaced. The ventricles and basilar  cisterns are normal in size and configuration. There is no evidence of  intracranial hemorrhage or mass-effect. The gray-white matter  differentiation is maintained. The sulci have a normal configuration,  and there are no abnormal extra-axial fluid collections. The structures  of the posterior fossa are unremarkable.      The included orbits and their contents are unremarkable.    . The  visualized osseous structures and overlying soft tissues of the skull  and face are unremarkable.      There is no abnormal enhancement.       Impression:       1. Negative CT brain without and with contrast        This report was finalized on 07/08/2017 12:36 by Dr. Adolph Echols MD.    CT Soft Tissue Neck With & Without Contrast [[id number]] Collected:  07/08/17 1258     Updated:  07/08/17 1307    Narrative:       CT SOFT TISSUE NECK W WO CONTRAST-  7/8/2017 11:43 AM CDT     HISTORY: Possible sublingual or ligual abcess; R13.12-Dysphagia,  oropharyngeal phase; R56.9-Unspecified convulsions;  T78.3XXS-Angioneurotic edema, sequela; F10.231-Alcohol dependence with  withdrawal delirium      COMPARISON: NONE      DOSE LENGTH PRODUCT: 602 mGy cm. Automated exposure control was also  utilized to decrease patient radiation dose.     TECHNIQUE: Serial helical tomographic images of the neck were obtained  in the standard fashion following the intravenous administration of  Isovue contrast.       FINDINGS:    Orbits, paranasal sinuses, and skull base: Normal     Nasopharynx: A nasogastric tube is present. Fluid is present in the  right maxillary antrum mucosal thickening is present left maxillary  antrum     Suprahyoid neck: Endotracheal tube is present. Pleural cavities obscured  by dental work. The base the tongue appears symmetric as visualized.  Submandibular glands are visualized. Sternomastoids sternocleidomastoid  muscles are symmetric.     Infrahyoid neck: Normal larynx, hypopharynx and supraglottis.     Thyroid: Normal.     Thoracic inlet: Pleural thickening is noted in the apex of the right  chest.  Lymph nodes: Normal. No lymphadenopathy.     Vascular structures: Normal.     Bones: Degenerative disc disease and degenerative spondylosis is present  in the cervical spine.     Other findings: None.       Impression:       1. No acute abnormalities identified on the enhanced CT soft tissue the  neck.  2. Nasogastric tube and endotracheal tube are present. Artifact from  dental work is noted.  3. Fluid in the right maxillary antrum is present  4. Degenerative spondylosis in the cervical spine        This report was finalized on 07/08/2017 13:04 by Dr. Adolph Echols MD.    XR Chest 1 View [[id number]] Collected:  07/09/17 0729     Updated:  07/09/17 0733    Narrative:       EXAMINATION:   XR CHEST 1 VW-  7/9/2017 7:29 AM CDT     HISTORY: Patient intubated     Single  view the chest obtained. Atelectasis right lung base is noted.  Left lung is clear. Cardiac silhouettes mildly enlarged. Nasogastric  tube and endotracheal tube are satisfactorily position.       Impression:       Mild atelectasis right lung base slightly increased since  July 8  This report was finalized on 07/09/2017 07:30 by Dr. Adolph Echols MD.             Intake/Output Summary (Last 24 hours) at 07/09/17 1011  Last data filed at 07/09/17 0900   Gross per 24 hour   Intake           2921.6 ml   Output             2000 ml   Net            921.6 ml       Physical Exam    Constitutional: No distress.   Seen and discussed with his nurse, Bao. No family present. He responds to Ativan plus Diprivan sedation well. ETT secure, Central line present.    HENT:   Head: Normocephalic and atraumatic.   Eyes: Pupils are equal, round, and reactive to light.  The patient's tongue continues to swell and there is perhaps a bit less malodoruos purulent drainage noted from the oral cavity.   Neck: Neck supple. No JVD present.   Cardiovascular: Normal rate and regular rhythm.   Pulmonary/Chest: Effort normal and breath sounds normal.   Ventilated.    Abdominal: Soft. Bowel sounds are normal.   Musculoskeletal: He exhibits edema (trace).   Neurological:   Currently he is sedated with propofol and Ativan.  Discussed case with ENT.  Not appropriate for extubation because of his lingual swelling.  He will likely require a tracheostomy.  Patient is more responsive and will follow commands such as moving feet and hands but still becomes agitated off sedation.  Skin: Skin is warm and dry.          Results Review:  I have reviewed the labs, radiology results, and diagnostic studies since my last progress note and made treatment changes reflective of the results.   I have reviewed the current medications.    Assessment/Plan     Hospital Problem List     Angio-edema    Seizures      1. Possible angioedema related to lisinopril.  2. Malignant  hypertension.  3. Possible new onset seizure disorder versus convulsive syncope.  4. Posterior reversible encephalopathy syndrome on MRI.  5. Tongue bite with sublingual hematoma.  6. Tobacco abuse.  7. Obstructive sleep apnea, noncompliant with device.  8. Gouty arthritis.  9. Daily alcohol use with Delirium tremens requiring sedation and mechanical ventilation.  10. Hypokalemia.   11. C. Difficile colitilis  12. Lingual or sublingual infection or abcess    PLAN:    Will discuss trach with family.  Decadron 6mg q 6 hoiurs  UA with C&S if indicated  CMP and CBC in a.m.  Continue current sedation      Cristino Encinas DO   07/09/17     Approximately 45 minutes of critical care time were spent managing the patient exclusive of billable procedures.     10:11 AM       Electronically signed by Cristino Encinas DO at 7/9/2017 10:45 AM      Indra Gallo MD at 7/9/2017 10:29 AM  Version 2 of 2             PULMONARY AND CRITICAL CARE PROGRESS NOTE - [facility]    Patient: Nishant Savage    1965    MR# [id number]    Acct# [id number]  07/09/17   10:29 AM  Referring Provider: Cristino Encinas DO    Chief Complaint: Mechanically ventilated    Interval history: Remains on ventilator with propofol gtt infusing.  Cardene is currently off, but noted hypertension.  RN states patient has improved from a neurological standpoint, opening eyes and following some commands.  Currently FiO2 30% with O2 sat 100%.  No other aggravating or allevitating factors.       Review of Systems:   Cannot obtain due to mechanical ventilation.  The patient notably is critically ill and connected to a ventilator.  As such patient cannot communicate and provide any history whatsoever, including any history of present illness or interval history since arrival or review of systems. The interested reviewer may note this fact, as an attempt has been made at collecting and documenting these portions of the patient history,  but this information is unobtainable despite attempted review and therefore cannot be documented at this time.     Ventilator Settings:  Vent Mode: VC/AC  Vt (Set, L): 0.65 L  Resp Rate (Set): 10  Pressure Support (cm H2O): 0 cm H20  FiO2 (%): 30 %  PEEP/CPAP (cm H2O): 5 cm H20  Minute Ventilation (L/min) (Obs): 14.7 L/min  Resp Rate (Observed) Vent: 22  I:E Ratio (Set): 1:1.30  I:E Ratio (Obs): 1:1.4  PIP Observed (cm H2O): 29 cm H2O  RSBI: 202    Physical Exam:  Temp:  [98.4 °F (36.9 °C)-100 °F (37.8 °C)] 100 °F (37.8 °C)  Heart Rate:  [] 99  Resp:  [14-24] 24  BP: (110-191)/() 163/95  FiO2 (%):  [30 %] 30 %    Intake/Output Summary (Last 24 hours) at 07/09/17 1029  Last data filed at 07/09/17 0900   Gross per 24 hour   Intake           2921.6 ml   Output             2000 ml   Net            921.6 ml     SpO2 Readings from Last 3 Encounters:   07/09/17 96%   09/30/16 98%   02/04/16 96%     Physical Exam   Gen.: Sedated and intubated on mechanical ventilatory support.  HEENT: Endotracheal tube is in place. Tongue is swollen and bruised.  Neck: Supple.  Respiratory: Fair air movement bilaterally, no wheezing, mild course rhonchi worse on the right.   Cardiac: Sinus rhythm. No murmur or gallop noted.   Abdomen: Soft, distended, bowel sounds present with nutrition infusing.   Neurologic: He has been opening eyes and following commands per nursing.   Psychiatric: Cannot assess as he is sedated and intubated.  Dermatologic: No rash noted.  Extremities: SCDs are in place.  Musculoskeletal: No joint deformities noted.  Lymphatic: No adenopathy palpated    Results from last 7 days  Lab Units 07/08/17  1057 07/06/17  0222 07/05/17  0158   WBC 10*3/mm3 12.30* 9.27 15.95*   HEMOGLOBIN g/dL 10.4* 12.1* 12.8*   PLATELETS 10*3/mm3 199 216 246       Results from last 7 days  Lab Units 07/09/17  0348 07/08/17  0411 07/07/17  0204   SODIUM mmol/L 143 143 142   POTASSIUM mmol/L 3.7 3.5 3.2*   BUN mg/dL 11 11 14      CREATININE mg/dL 0.91 0.98 1.05       Results from last 7 days  Lab Units 07/09/17  0159 07/08/17  0214 07/07/17  0339   PH, ARTERIAL pH units 7.417 7.422 7.434   PCO2, ARTERIAL mm Hg 36.3 35.8 31.5*   PO2 ART mm Hg 80.8 71.1* 76.4*   FIO2 % 30 30 30        Recent films:  Imaging Results (last 24 hours)     Procedure Component Value Units Date/Time    CT Head With & Without Contrast [[id number]] Collected:  07/08/17 1233     Updated:  07/08/17 1239    Narrative:       CT BRAIN without and with contrast dated 7/8/2017 11:43 AM CDT     HISTORY: Possible sublingual abscess     COMPARISON: None      DOSE LENGTH PRODUCT: 1779 mGy cm     In order to have a CT radiation dose as low as reasonably achievable,  Automated Exposure Control was utilized for adjustment of the mA and/or  KV according to patient size.     TECHNIQUE: Serial axial tomographic images of the brain were obtained  without and with the use of intravenous contrast.      FINDINGS:   The midline structures are nondisplaced. The ventricles and basilar  cisterns are normal in size and configuration. There is no evidence of  intracranial hemorrhage or mass-effect. The gray-white matter  differentiation is maintained. The sulci have a normal configuration,  and there are no abnormal extra-axial fluid collections. The structures  of the posterior fossa are unremarkable.      The included orbits and their contents are unremarkable.    . The  visualized osseous structures and overlying soft tissues of the skull  and face are unremarkable.      There is no abnormal enhancement.       Impression:       1. Negative CT brain without and with contrast        This report was finalized on 07/08/2017 12:36 by Dr. Adolph Echols MD.    CT Soft Tissue Neck With & Without Contrast [[id number]] Collected:  07/08/17 1258     Updated:  07/08/17 1307    Narrative:       CT SOFT TISSUE NECK W WO CONTRAST- 7/8/2017 11:43 AM CDT     HISTORY: Possible sublingual or ligual abcess;  R13.12-Dysphagia,  oropharyngeal phase; R56.9-Unspecified convulsions;  T78.3XXS-Angioneurotic edema, sequela; F10.231-Alcohol dependence with  withdrawal delirium      COMPARISON: NONE      DOSE LENGTH PRODUCT: 602 mGy cm. Automated exposure control was also  utilized to decrease patient radiation dose.     TECHNIQUE: Serial helical tomographic images of the neck were obtained  in the standard fashion following the intravenous administration of  Isovue contrast.       FINDINGS:    Orbits, paranasal sinuses, and skull base: Normal     Nasopharynx: A nasogastric tube is present. Fluid is present in the  right maxillary antrum mucosal thickening is present left maxillary  antrum     Suprahyoid neck: Endotracheal tube is present. Pleural cavities obscured  by dental work. The base the tongue appears symmetric as visualized.  Submandibular glands are visualized. Sternomastoids sternocleidomastoid  muscles are symmetric.     Infrahyoid neck: Normal larynx, hypopharynx and supraglottis.     Thyroid: Normal.     Thoracic inlet: Pleural thickening is noted in the apex of the right  chest.  Lymph nodes: Normal. No lymphadenopathy.     Vascular structures: Normal.     Bones: Degenerative disc disease and degenerative spondylosis is present  in the cervical spine.     Other findings: None.       Impression:       1. No acute abnormalities identified on the enhanced CT soft tissue the  neck.  2. Nasogastric tube and endotracheal tube are present. Artifact from  dental work is noted.  3. Fluid in the right maxillary antrum is present  4. Degenerative spondylosis in the cervical spine        This report was finalized on 07/08/2017 13:04 by Dr. Adolph Echols MD.    XR Chest 1 View [[id number]] Collected:  07/09/17 0729     Updated:  07/09/17 0733    Narrative:       EXAMINATION:   XR CHEST 1 VW-  7/9/2017 7:29 AM CDT     HISTORY: Patient intubated     Single view the chest obtained. Atelectasis right lung base is noted.  Left lung  is clear. Cardiac silhouettes mildly enlarged. Nasogastric  tube and endotracheal tube are satisfactorily position.       Impression:       Mild atelectasis right lung base slightly increased since  July 8  This report was finalized on 07/09/2017 07:30 by Dr. Adolph Echols MD.        Films reviewed personally by me.  My interpretation: slight increase in R side atelectasis     Pulmonary Assessment:  25. Respiratory failure related to agitation from ethanol withdrawal.  26. Malignant HTN   27. PRES   28. Recent problems with angioedema which have resolved  29. History of chronic ethanol use  30. C-diff   31. Tongue hematoma  32. MRSA respiratory culture      Recommend:   · Continue current mechanical ventilation.  Not candidate for weaning trials 2' to neuro status, hypertension, and tongue swelling  · ENT note reviewed   · Respiratory culture with MRSA, already on Vancomycin     Electronically signed by EPI Manzo on 7/9/2017 at 10:29 AM  Physician substantive contribution:  Pertinent symptoms/interval history include: The patient is able to respond to questioning even on his Diprivan drip.  There is concern about his tongue swelling which could certainly be a problem in terms of possible extubation even if his neurologic status permits.  Respiratory exam shows pertinent findings of clear lung fields on chest exam  Plan includes: There are no plans to consider weaning the patient to extubation at present in view of his ENT and neurologic issues.  I have seen and examined patient personally, performing a face-to-face diagnostic evaluation with plan of care reviewed and developed with APRN and nursing staff. I have addended and/or modified the above history of present illness, physical examination, and assessment and plan to reflect my findings and impressions. Essential elements of the care plan were discussed with APRN above.  Agree with findings and assessment/plan as documented  above.    Electronically signed by Indra Gallo MD, on 7/9/2017, 1:39 PM               Electronically signed by Indra Gallo MD at 7/9/2017  1:41 PM      EPI Manzo at 7/9/2017 10:29 AM  Version 1 of 2             PULMONARY AND CRITICAL CARE PROGRESS NOTE - [facility]    Patient: Nishant Savage    1965    MR# [id number]    Acct# [id number]  07/09/17   10:29 AM  Referring Provider: Cristino Encinas DO    Chief Complaint: Mechanically ventilated    Interval history: Remains on ventilator with propofol gtt infusing.  Cardene is currently off, but noted hypertension.  RN states patient has improved from a neurological standpoint, opening eyes and following some commands.  Currently FiO2 30% with O2 sat 100%.  No other aggravating or allevitating factors.         Meds:    albuterol 2.5 mg Nebulization Q6H - RT   amLODIPine 10 mg Per G Tube Q24H   ceftriaxone 2 g Intravenous Q12H   chlorhexidine 15 mL Mouth/Throat Q12H   dexamethasone 6 mg Intravenous Q6H   dextromethorphan polistirex ER 30 mg Oral BID   enoxaparin 40 mg Subcutaneous Daily   famotidine 20 mg Intravenous Q12H   levETIRAcetam 1,000 mg Intravenous Q12H   metoprolol tartrate 50 mg Nasogastric Q12H   metroNIDAZOLE 500 mg Intravenous Q8H   thiamine (VITAMIN B1) IVPB 100 mg Intravenous Daily   vancomycin 1,250 mg Intravenous Q12H       niCARdipine 5-15 mg/hr Last Rate: 5 mg/hr (07/09/17 0815)   Pharmacy to dose vancomycin     propofol 5-50 mcg/kg/min Last Rate: 70 mcg/kg/min (07/09/17 0855)     Review of Systems:   Cannot obtain due to mechanical ventilation.  The patient notably is critically ill and connected to a ventilator.  As such patient cannot communicate and provide any history whatsoever, including any history of present illness or interval history since arrival or review of systems. The interested reviewer may note this fact, as an attempt has been made at collecting and documenting these portions  of the patient history, but this information is unobtainable despite attempted review and therefore cannot be documented at this time.     Ventilator Settings:  Vent Mode: VC/AC  Vt (Set, L): 0.65 L  Resp Rate (Set): 10  Pressure Support (cm H2O): 0 cm H20  FiO2 (%): 30 %  PEEP/CPAP (cm H2O): 5 cm H20  Minute Ventilation (L/min) (Obs): 14.7 L/min  Resp Rate (Observed) Vent: 22  I:E Ratio (Set): 1:1.30  I:E Ratio (Obs): 1:1.4  PIP Observed (cm H2O): 29 cm H2O  RSBI: 202    Physical Exam:  Temp:  [98.4 °F (36.9 °C)-100 °F (37.8 °C)] 100 °F (37.8 °C)  Heart Rate:  [] 99  Resp:  [14-24] 24  BP: (110-191)/() 163/95  FiO2 (%):  [30 %] 30 %    Intake/Output Summary (Last 24 hours) at 07/09/17 1029  Last data filed at 07/09/17 0900   Gross per 24 hour   Intake           2921.6 ml   Output             2000 ml   Net            921.6 ml     SpO2 Readings from Last 3 Encounters:   07/09/17 96%   09/30/16 98%   02/04/16 96%     Physical Exam   Gen.: Sedated and intubated on mechanical ventilatory support.  HEENT: Endotracheal tube is in place. Tongue is swollen and bruised.  Neck: Supple.  Respiratory: Fair air movement bilaterally, no wheezing, mild course rhonchi worse on the right.   Cardiac: Sinus rhythm. No murmur or gallop noted.   Abdomen: Soft, distended, bowel sounds present with nutrition infusing.   Neurologic: He has been opening eyes and following commands per nursing.   Psychiatric: Cannot assess as he is sedated and intubated.  Dermatologic: No rash noted.  Extremities: SCDs are in place.  Musculoskeletal: No joint deformities noted.  Lymphatic: No adenopathy palpated    Results from last 7 days  Lab Units 07/08/17  1057 07/06/17  0222 07/05/17  0158   WBC 10*3/mm3 12.30* 9.27 15.95*   HEMOGLOBIN g/dL 10.4* 12.1* 12.8*   PLATELETS 10*3/mm3 199 216 246       Results from last 7 days  Lab Units 07/09/17  0348 07/08/17  0411 07/07/17  0204   SODIUM mmol/L 143 143 142   POTASSIUM mmol/L 3.7 3.5 3.2*   BUN  mg/dL 11 11 14   CREATININE mg/dL 0.91 0.98 1.05       Results from last 7 days  Lab Units 07/09/17  0159 07/08/17  0214 07/07/17  0339   PH, ARTERIAL pH units 7.417 7.422 7.434   PCO2, ARTERIAL mm Hg 36.3 35.8 31.5*   PO2 ART mm Hg 80.8 71.1* 76.4*   FIO2 % 30 30 30        Recent films:  Imaging Results (last 24 hours)     Procedure Component Value Units Date/Time    CT Head With & Without Contrast [[id number]] Collected:  07/08/17 1233     Updated:  07/08/17 1239    Narrative:       CT BRAIN without and with contrast dated 7/8/2017 11:43 AM CDT     HISTORY: Possible sublingual abscess     COMPARISON: None      DOSE LENGTH PRODUCT: 1779 mGy cm     In order to have a CT radiation dose as low as reasonably achievable,  Automated Exposure Control was utilized for adjustment of the mA and/or  KV according to patient size.     TECHNIQUE: Serial axial tomographic images of the brain were obtained  without and with the use of intravenous contrast.      FINDINGS:   The midline structures are nondisplaced. The ventricles and basilar  cisterns are normal in size and configuration. There is no evidence of  intracranial hemorrhage or mass-effect. The gray-white matter  differentiation is maintained. The sulci have a normal configuration,  and there are no abnormal extra-axial fluid collections. The structures  of the posterior fossa are unremarkable.      The included orbits and their contents are unremarkable.    . The  visualized osseous structures and overlying soft tissues of the skull  and face are unremarkable.      There is no abnormal enhancement.       Impression:       1. Negative CT brain without and with contrast        This report was finalized on 07/08/2017 12:36 by Dr. Adolph Echols MD.    CT Soft Tissue Neck With & Without Contrast [[id number]] Collected:  07/08/17 1258     Updated:  07/08/17 1307    Narrative:       CT SOFT TISSUE NECK W WO CONTRAST- 7/8/2017 11:43 AM CDT     HISTORY: Possible sublingual or  ligual abcess; R13.12-Dysphagia,  oropharyngeal phase; R56.9-Unspecified convulsions;  T78.3XXS-Angioneurotic edema, sequela; F10.231-Alcohol dependence with  withdrawal delirium      COMPARISON: NONE      DOSE LENGTH PRODUCT: 602 mGy cm. Automated exposure control was also  utilized to decrease patient radiation dose.     TECHNIQUE: Serial helical tomographic images of the neck were obtained  in the standard fashion following the intravenous administration of  Isovue contrast.       FINDINGS:    Orbits, paranasal sinuses, and skull base: Normal     Nasopharynx: A nasogastric tube is present. Fluid is present in the  right maxillary antrum mucosal thickening is present left maxillary  antrum     Suprahyoid neck: Endotracheal tube is present. Pleural cavities obscured  by dental work. The base the tongue appears symmetric as visualized.  Submandibular glands are visualized. Sternomastoids sternocleidomastoid  muscles are symmetric.     Infrahyoid neck: Normal larynx, hypopharynx and supraglottis.     Thyroid: Normal.     Thoracic inlet: Pleural thickening is noted in the apex of the right  chest.  Lymph nodes: Normal. No lymphadenopathy.     Vascular structures: Normal.     Bones: Degenerative disc disease and degenerative spondylosis is present  in the cervical spine.     Other findings: None.       Impression:       1. No acute abnormalities identified on the enhanced CT soft tissue the  neck.  2. Nasogastric tube and endotracheal tube are present. Artifact from  dental work is noted.  3. Fluid in the right maxillary antrum is present  4. Degenerative spondylosis in the cervical spine        This report was finalized on 07/08/2017 13:04 by Dr. Adolph Echols MD.    XR Chest 1 View [[id number]] Collected:  07/09/17 0729     Updated:  07/09/17 0733    Narrative:       EXAMINATION:   XR CHEST 1 VW-  7/9/2017 7:29 AM CDT     HISTORY: Patient intubated     Single view the chest obtained. Atelectasis right lung base is  noted.  Left lung is clear. Cardiac silhouettes mildly enlarged. Nasogastric  tube and endotracheal tube are satisfactorily position.       Impression:       Mild atelectasis right lung base slightly increased since  July 8  This report was finalized on 07/09/2017 07:30 by Dr. Adolph Echols MD.        Films reviewed personally by me.  My interpretation: slight increase in R side atelectasis     Pulmonary Assessment:  33. Respiratory failure related to agitation from ethanol withdrawal.  34. Malignant HTN   35. PRES   36. Recent problems with angioedema which have resolved  37. History of chronic ethanol use  38. C-diff   39. Tongue hematoma  40. MRSA respiratory culture      Recommend:   · Continue current mechanical ventilation.  Not candidate for weaning trials 2' to neuro status, hypertension, and tongue swelling  · ENT note reviewed   · Respiratory culture with MRSA, already on Vancomycin     Electronically signed by EPI Manzo on 7/9/2017 at 10:29 AM           Electronically signed by EPI Manzo at 7/9/2017 10:42 AM

## 2017-07-11 ENCOUNTER — APPOINTMENT (OUTPATIENT)
Dept: GENERAL RADIOLOGY | Facility: HOSPITAL | Age: 52
End: 2017-07-11

## 2017-07-11 PROBLEM — J18.9 HCAP (HEALTHCARE-ASSOCIATED PNEUMONIA): Status: ACTIVE | Noted: 2017-07-11

## 2017-07-11 LAB
ABO GROUP BLD: NORMAL
ANION GAP SERPL CALCULATED.3IONS-SCNC: 13 MMOL/L (ref 4–13)
ARTERIAL PATENCY WRIST A: ABNORMAL
ATMOSPHERIC PRESS: ABNORMAL MMHG
BASE EXCESS BLDA CALC-SCNC: 1.1 MMOL/L (ref -2–2)
BDY SITE: ABNORMAL
BUN BLD-MCNC: 21 MG/DL (ref 5–21)
BUN/CREAT SERPL: 19.6 (ref 7–25)
CALCIUM SPEC-SCNC: 8.9 MG/DL (ref 8.4–10.4)
CHLORIDE SERPL-SCNC: 109 MMOL/L (ref 98–110)
CO2 SERPL-SCNC: 25 MMOL/L (ref 24–31)
CREAT BLD-MCNC: 1.07 MG/DL (ref 0.5–1.4)
GFR SERPL CREATININE-BSD FRML MDRD: 73 ML/MIN/1.73
GLUCOSE BLD-MCNC: 143 MG/DL (ref 70–100)
HCO3 BLDA-SCNC: 23.4 MMOL/L (ref 22–26)
HOROWITZ INDEX BLD+IHG-RTO: 40 %
MODALITY: ABNORMAL
PCO2 BLDA: 31.1 MM HG (ref 35–45)
PEEP RESPIRATORY: 10 CM[H2O]
PH BLDA: 7.5 PH UNITS (ref 7.35–7.45)
PO2 BLDA: 76.1 MM HG (ref 80–100)
POTASSIUM BLD-SCNC: 3.7 MMOL/L (ref 3.5–5.3)
RH BLD: NEGATIVE
SAO2 % BLDCOA: 96.3 % (ref 94–100)
SAO2 % BLDCOA: 96.3 % (ref 94–100)
SODIUM BLD-SCNC: 147 MMOL/L (ref 135–145)
TOTAL RATE: 10 BREATHS/MINUTE
VENT CPAP/PEEP: 10
VENTILATOR MODE: AC

## 2017-07-11 PROCEDURE — 82803 BLOOD GASES ANY COMBINATION: CPT

## 2017-07-11 PROCEDURE — 25010000002 FUROSEMIDE PER 20 MG: Performed by: FAMILY MEDICINE

## 2017-07-11 PROCEDURE — 25010000002 ENOXAPARIN PER 10 MG: Performed by: FAMILY MEDICINE

## 2017-07-11 PROCEDURE — 25010000002 LORAZEPAM PER 2 MG: Performed by: FAMILY MEDICINE

## 2017-07-11 PROCEDURE — 80048 BASIC METABOLIC PNL TOTAL CA: CPT | Performed by: FAMILY MEDICINE

## 2017-07-11 PROCEDURE — P9017 PLASMA 1 DONOR FRZ W/IN 8 HR: HCPCS

## 2017-07-11 PROCEDURE — 25010000002 PROPOFOL 1000 MG/ML EMULSION: Performed by: INTERNAL MEDICINE

## 2017-07-11 PROCEDURE — 71010 HC CHEST PA OR AP: CPT

## 2017-07-11 PROCEDURE — 25010000002 THIAMINE PER 100 MG: Performed by: INTERNAL MEDICINE

## 2017-07-11 PROCEDURE — 25010000002 LEVOFLOXACIN PER 250 MG: Performed by: FAMILY MEDICINE

## 2017-07-11 PROCEDURE — 94770: CPT

## 2017-07-11 PROCEDURE — 25010000003 DEXAMETHASONE SODIUM PHOSPHATE 100 MG/10ML SOLUTION 1 ML VIAL: Performed by: FAMILY MEDICINE

## 2017-07-11 PROCEDURE — 86900 BLOOD TYPING SEROLOGIC ABO: CPT | Performed by: FAMILY MEDICINE

## 2017-07-11 PROCEDURE — 97110 THERAPEUTIC EXERCISES: CPT

## 2017-07-11 PROCEDURE — 94760 N-INVAS EAR/PLS OXIMETRY 1: CPT

## 2017-07-11 PROCEDURE — 94799 UNLISTED PULMONARY SVC/PX: CPT

## 2017-07-11 PROCEDURE — 94003 VENT MGMT INPAT SUBQ DAY: CPT

## 2017-07-11 PROCEDURE — 99233 SBSQ HOSP IP/OBS HIGH 50: CPT | Performed by: PSYCHIATRY & NEUROLOGY

## 2017-07-11 PROCEDURE — 25010000003 DEXAMETHASONE SODIUM PHOSPHATE 100 MG/10ML SOLUTION 10 ML VIAL: Performed by: FAMILY MEDICINE

## 2017-07-11 PROCEDURE — 36600 WITHDRAWAL OF ARTERIAL BLOOD: CPT

## 2017-07-11 PROCEDURE — 25010000002 CEFTRIAXONE: Performed by: FAMILY MEDICINE

## 2017-07-11 PROCEDURE — 25010000003 LEVETIRACETAM IN NACL 0.75% 1000 MG/100ML SOLUTION: Performed by: PSYCHIATRY & NEUROLOGY

## 2017-07-11 PROCEDURE — 99231 SBSQ HOSP IP/OBS SF/LOW 25: CPT | Performed by: NURSE PRACTITIONER

## 2017-07-11 PROCEDURE — 86901 BLOOD TYPING SEROLOGIC RH(D): CPT | Performed by: FAMILY MEDICINE

## 2017-07-11 PROCEDURE — 36430 TRANSFUSION BLD/BLD COMPNT: CPT

## 2017-07-11 PROCEDURE — 25010000002 VANCOMYCIN 10 G RECONSTITUTED SOLUTION: Performed by: FAMILY MEDICINE

## 2017-07-11 PROCEDURE — 86927 PLASMA FRESH FROZEN: CPT

## 2017-07-11 PROCEDURE — 25010000002 PIPERACILLIN SOD-TAZOBACTAM PER 1 G: Performed by: FAMILY MEDICINE

## 2017-07-11 RX ORDER — LEVOFLOXACIN 5 MG/ML
750 INJECTION, SOLUTION INTRAVENOUS EVERY 24 HOURS
Status: DISCONTINUED | OUTPATIENT
Start: 2017-07-11 | End: 2017-07-16

## 2017-07-11 RX ORDER — FUROSEMIDE 10 MG/ML
40 INJECTION INTRAMUSCULAR; INTRAVENOUS EVERY 12 HOURS SCHEDULED
Status: COMPLETED | OUTPATIENT
Start: 2017-07-11 | End: 2017-07-11

## 2017-07-11 RX ORDER — TERAZOSIN 5 MG/1
5 CAPSULE ORAL NIGHTLY
Status: DISCONTINUED | OUTPATIENT
Start: 2017-07-11 | End: 2017-07-12

## 2017-07-11 RX ORDER — METOPROLOL TARTRATE 100 MG/1
100 TABLET ORAL EVERY 12 HOURS SCHEDULED
Status: DISCONTINUED | OUTPATIENT
Start: 2017-07-11 | End: 2017-07-14

## 2017-07-11 RX ADMIN — DEXAMETHASONE SODIUM PHOSPHATE 6 MG: 10 INJECTION, SOLUTION INTRAMUSCULAR; INTRAVENOUS at 16:19

## 2017-07-11 RX ADMIN — FUROSEMIDE 40 MG: 10 INJECTION, SOLUTION INTRAMUSCULAR; INTRAVENOUS at 11:26

## 2017-07-11 RX ADMIN — METRONIDAZOLE 500 MG: 500 TABLET, FILM COATED ORAL at 08:08

## 2017-07-11 RX ADMIN — FUROSEMIDE 40 MG: 10 INJECTION, SOLUTION INTRAMUSCULAR; INTRAVENOUS at 22:44

## 2017-07-11 RX ADMIN — THIAMINE HYDROCHLORIDE 100 MG: 100 INJECTION, SOLUTION INTRAMUSCULAR; INTRAVENOUS at 08:09

## 2017-07-11 RX ADMIN — PROPOFOL 75 MCG/KG/MIN: 10 INJECTION, EMULSION INTRAVENOUS at 16:46

## 2017-07-11 RX ADMIN — PROPOFOL 75 MCG/KG/MIN: 10 INJECTION, EMULSION INTRAVENOUS at 06:09

## 2017-07-11 RX ADMIN — PROPOFOL 75 MCG/KG/MIN: 10 INJECTION, EMULSION INTRAVENOUS at 01:13

## 2017-07-11 RX ADMIN — ALBUTEROL SULFATE 2.5 MG: 2.5 SOLUTION RESPIRATORY (INHALATION) at 01:00

## 2017-07-11 RX ADMIN — PROPOFOL 75 MCG/KG/MIN: 10 INJECTION, EMULSION INTRAVENOUS at 22:44

## 2017-07-11 RX ADMIN — ENOXAPARIN SODIUM 40 MG: 40 INJECTION SUBCUTANEOUS at 08:09

## 2017-07-11 RX ADMIN — FAMOTIDINE 20 MG: 20 TABLET, FILM COATED ORAL at 22:42

## 2017-07-11 RX ADMIN — VANCOMYCIN HYDROCHLORIDE 1250 MG: 10 INJECTION, POWDER, LYOPHILIZED, FOR SOLUTION INTRAVENOUS at 09:07

## 2017-07-11 RX ADMIN — ALBUTEROL SULFATE 2.5 MG: 2.5 SOLUTION RESPIRATORY (INHALATION) at 19:19

## 2017-07-11 RX ADMIN — CLONIDINE 1 PATCH: 0.3 PATCH TRANSDERMAL at 14:23

## 2017-07-11 RX ADMIN — CHLORHEXIDINE GLUCONATE 15 ML: 1.2 RINSE ORAL at 08:10

## 2017-07-11 RX ADMIN — TAZOBACTAM SODIUM AND PIPERACILLIN SODIUM 3.38 G: 375; 3 INJECTION, SOLUTION INTRAVENOUS at 16:18

## 2017-07-11 RX ADMIN — METRONIDAZOLE 500 MG: 500 TABLET, FILM COATED ORAL at 14:40

## 2017-07-11 RX ADMIN — TERAZOSIN HYDROCHLORIDE 5 MG: 5 CAPSULE ORAL at 22:42

## 2017-07-11 RX ADMIN — ALBUTEROL SULFATE 2.5 MG: 2.5 SOLUTION RESPIRATORY (INHALATION) at 06:29

## 2017-07-11 RX ADMIN — ALBUTEROL SULFATE 2.5 MG: 2.5 SOLUTION RESPIRATORY (INHALATION) at 11:58

## 2017-07-11 RX ADMIN — TAZOBACTAM SODIUM AND PIPERACILLIN SODIUM 3.38 G: 375; 3 INJECTION, SOLUTION INTRAVENOUS at 11:26

## 2017-07-11 RX ADMIN — METRONIDAZOLE 500 MG: 500 TABLET, FILM COATED ORAL at 22:42

## 2017-07-11 RX ADMIN — LEVOFLOXACIN 750 MG: 5 INJECTION, SOLUTION INTRAVENOUS at 11:26

## 2017-07-11 RX ADMIN — PROPOFOL 75 MCG/KG/MIN: 10 INJECTION, EMULSION INTRAVENOUS at 11:52

## 2017-07-11 RX ADMIN — LORAZEPAM 4 MG: 2 INJECTION INTRAMUSCULAR at 11:45

## 2017-07-11 RX ADMIN — METRONIDAZOLE 500 MG: 500 TABLET, FILM COATED ORAL at 00:34

## 2017-07-11 RX ADMIN — AMLODIPINE BESYLATE 5 MG: 5 TABLET ORAL at 08:09

## 2017-07-11 RX ADMIN — METOPROLOL TARTRATE 50 MG: 50 TABLET ORAL at 08:08

## 2017-07-11 RX ADMIN — FAMOTIDINE 20 MG: 20 TABLET, FILM COATED ORAL at 08:09

## 2017-07-11 RX ADMIN — DEXAMETHASONE SODIUM PHOSPHATE 6 MG: 10 INJECTION, SOLUTION INTRAMUSCULAR; INTRAVENOUS at 11:26

## 2017-07-11 RX ADMIN — DEXTROMETHORPHAN 30 MG: 30 SUSPENSION, EXTENDED RELEASE ORAL at 18:27

## 2017-07-11 RX ADMIN — VANCOMYCIN HYDROCHLORIDE 1250 MG: 10 INJECTION, POWDER, LYOPHILIZED, FOR SOLUTION INTRAVENOUS at 22:43

## 2017-07-11 RX ADMIN — DEXAMETHASONE SODIUM PHOSPHATE 6 MG: 10 INJECTION, SOLUTION INTRAMUSCULAR; INTRAVENOUS at 05:25

## 2017-07-11 RX ADMIN — CEFTRIAXONE 2 G: 2 INJECTION, POWDER, FOR SOLUTION INTRAMUSCULAR; INTRAVENOUS at 09:07

## 2017-07-11 RX ADMIN — LEVETIRACETAM 1000 MG: 1000 INJECTION, SOLUTION INTRAVENOUS at 22:43

## 2017-07-11 RX ADMIN — DEXTROMETHORPHAN 30 MG: 30 SUSPENSION, EXTENDED RELEASE ORAL at 08:10

## 2017-07-11 RX ADMIN — PROPOFOL 75 MCG/KG/MIN: 10 INJECTION, EMULSION INTRAVENOUS at 14:40

## 2017-07-11 RX ADMIN — PROPOFOL 75 MCG/KG/MIN: 10 INJECTION, EMULSION INTRAVENOUS at 09:06

## 2017-07-11 RX ADMIN — CHLORHEXIDINE GLUCONATE 15 ML: 1.2 RINSE ORAL at 22:43

## 2017-07-11 RX ADMIN — PROPOFOL 75 MCG/KG/MIN: 10 INJECTION, EMULSION INTRAVENOUS at 03:49

## 2017-07-11 RX ADMIN — LEVETIRACETAM 1000 MG: 1000 INJECTION, SOLUTION INTRAVENOUS at 08:10

## 2017-07-11 RX ADMIN — PROPOFOL 75 MCG/KG/MIN: 10 INJECTION, EMULSION INTRAVENOUS at 19:36

## 2017-07-11 RX ADMIN — DEXAMETHASONE SODIUM PHOSPHATE 6 MG: 10 INJECTION, SOLUTION INTRAMUSCULAR; INTRAVENOUS at 00:34

## 2017-07-11 NOTE — PROGRESS NOTES
[facility] Progress Note:       LOS: 7 days   Patient Care Team:  Linda Hoffman DNP, APRN as PCP - General  Linda Hoffman DNP, APRN as PCP - Family Medicine    Active consulting complaint: tongue swelling/angioedema    Subjective     Interval History:     Status of active consulting problem: tongue swelling slightly improved per RN and family after famotidine and steroids    History taken from: family RN    Review of Systems:    Review of Systems   Unable to perform ROS: Intubated       Objective     Vital Signs  Temp:  [98.7 °F (37.1 °C)-99.2 °F (37.3 °C)] 99.2 °F (37.3 °C)  Heart Rate:  [] 85  Resp:  [12-22] 21  BP: (133-206)/() 191/116  FiO2 (%):  [40 %] 40 %    Physical Exam:   Physical Exam   Constitutional: No distress. He is sedated and intubated.   HENT:   ET tube noted- on vent. Tongue with minimal improvement in edema, bruised and protruding. Left lingual ulceration healing.    Pulmonary/Chest: No stridor. He is intubated. No respiratory distress.        Results Review:       Lab Results (last 24 hours)     Procedure Component Value Units Date/Time    Basic Metabolic Panel [[id number]]  (Abnormal) Collected:  07/11/17 0142    Specimen:  Blood Updated:  07/11/17 0222     Glucose 143 (H) mg/dL      BUN 21 mg/dL      Creatinine 1.07 mg/dL      Sodium 147 (H) mmol/L      Potassium 3.7 mmol/L      Chloride 109 mmol/L      CO2 25.0 mmol/L      Calcium 8.9 mg/dL      eGFR Non African Amer 73 mL/min/1.73      BUN/Creatinine Ratio 19.6     Anion Gap 13.0 mmol/L     Narrative:       GFR Normal >60  Chronic Kidney Disease <60  Kidney Failure <15    Blood Gas, Arterial [[id number]]  (Abnormal) Collected:  07/11/17 0327    Specimen:  Arterial Blood Updated:  07/11/17 0330     Site Arterial: right radial     Gera's Test --      Documented in Rapid Comm        pH, Arterial 7.495 (H) pH units      pCO2, Arterial 31.1 (L) mm Hg      pO2, Arterial 76.1 (L) mm Hg      HCO3, Arterial 23.4 mmol/L       Base Excess, Arterial 1.1 mmol/L      O2 Saturation, Arterial 96.3 %      O2 Saturation Calculated 96.3 %      Barometric Pressure for Blood Gas -- mmHg       Component not reported at this site.        Modality Ventilator     FIO2 40 %      Ventilator Mode AC     Rate 10.0 Breaths/minute      PEEP 10.0     Vent CPAP/PEEP 10.0    Narrative:       Serial Number: [id number]    : [id number]        Imaging Results (last 24 hours)     Procedure Component Value Units Date/Time    XR Abdomen KUB [[id number]] Collected:  07/10/17 1314     Updated:  07/10/17 1319    Narrative:       HISTORY: Abdomen distended and firm.     FINDINGS: KUB radiograph demonstrates a nonspecific bowel gas pattern.  There is noted to be a distended loop of what I suspect represents the  sigmoid colon with a sigmoid volvulus in the differential. I do not see  evidence of obstruction or pneumatosis. There is no evidence of  pneumoperitoneum.       Impression:       . Nonspecific bowel gas pattern with gas in both small bowel  and colon. There is some asymmetric distention of what appears to be a  loop of the sigmoid colon with a sigmoid volvulus a differential  although considered unlikely given the lack of distention of the more  proximal bowel and the patient's age. If symptoms are persistent follow  up with CT imaging could be obtained for further characterization.  This report was finalized on 07/10/2017 13:16 by Dr. Dilshad Silver MD.    CT Abdomen Pelvis Without Contrast [[id number]] Collected:  07/10/17 2035     Updated:  07/10/17 2047    Narrative:       EXAMINATION: CT ABDOMEN PELVIS WO CONTRAST- 7/10/2017 8:35 PM CDT     HISTORY: Abdominal distention and pain, possible sigmoid volvulus.     DOSE: 1821 mGycm (Automatic exposure control technique was implemented  in an effort to keep the radiation dose as low as possible without  compromising image quality)     REPORT: Spiral CT of the abdomen and pelvis was performed  without  intravenous or oral contrast from the lung bases through the pubic  symphysis. Reconstructed coronal and sagittal images are also reviewed.  Comparison: KUB on the same date. There is no previous CT. Lung windows  demonstrate consolidation of the lower lobe laterally with air  bronchograms, probable bilateral pneumonia mixed with atelectasis. There  are tiny bilateral pleural effusions. A nasogastric tube is present in  good position. There is mild cardiomegaly. The liver and spleen appear  homogeneous. The gallbladder is slightly contracted. Pancreas and  adrenal glands are within normal limits. No nephrolithiasis is seen and  there is no hydronephrosis. There is perinephric fat stranding  bilaterally, which is nonspecific. There is no evidence of bowel  obstruction or sigmoid volvulus. The sigmoid colon is redundant. There  is moderate sigmoid diverticulosis. There is nonspecific increased  attenuation of fat planes in the pelvis, including presacral fat planes  and fat planes at the level of the aortic bifurcation and inguinal  regions. There is also mildly increased attenuation of fat planes over  the flanks, slightly greater on the right. There are fat containing  inguinal hernias bilaterally. No intra-abdominal abscess or free air is  identified. The inflammatory changes do not appear to be associated with  the sigmoid colon. The appendix is normal. There is a small  fat-containing periumbilical hernia that measures 2.6 cm. Review of bone  windows is unremarkable.       Impression:       1. No evidence of bowel obstruction or sigmoid volvulus. There is  moderate sigmoid diverticulosis without acute diverticulitis.  2. Nonspecific mild fat stranding/inflammation within the  retroperitoneum and extraperitoneal fat planes in the pelvis, without  evidence of an abscess.  3. Extensive infiltrates in the lower lobes with air bronchograms  suspicious for acute lateral lower lobe pneumonia probably combined  with  atelectasis.        This report was finalized on 07/10/2017 20:44 by Dr. Marvin Mar MD.    XR Chest 1 View [[id number]] Collected:  07/11/17 0722     Updated:  07/11/17 0726    Narrative:       EXAMINATION:   XR CHEST 1 VW-  7/11/2017 7:22 AM CDT     HISTORY: Respiratory failure.     Frontal upright radiograph of the chest 7/11/2017 3:15 AM CDT     COMPARISON: 07/10/2017.     FINDINGS:   The lungs are clear. Cardiac silhouettes mildly enlarged. Left  subclavian catheter and endotracheal tube are present and satisfactorily  positioned.      The osseous structures and surrounding soft tissues demonstrate no acute  abnormality.       Impression:       1. No radiographic evidence of acute cardiopulmonary process.        This report was finalized on 07/11/2017 07:23 by Dr. Adolph Echols MD.          Medication Review:     Current Facility-Administered Medications   Medication Dose Route Frequency Provider Last Rate Last Dose   • acetaminophen (TYLENOL) tablet 650 mg  650 mg Oral Q4H PRN Jairon Perez DO   650 mg at 07/09/17 1003   • albuterol (PROVENTIL) nebulizer solution 0.083% 2.5 mg/3mL  2.5 mg Nebulization Q6H - RT Jairon Perez DO   2.5 mg at 07/11/17 0629   • amLODIPine (NORVASC) tablet 5 mg  5 mg Per G Tube Q24H Cristino Encinas DO   5 mg at 07/11/17 0809   • chlorhexidine (PERIDEX) 0.12 % solution 15 mL  15 mL Mouth/Throat Q12H Cristino Encinas DO   15 mL at 07/11/17 0810   • CloNIDine (CATAPRES-TTS) 0.3 MG/24HR patch 1 patch  1 patch Transdermal Weekly Cristino Encinas DO       • dexamethasone sodium phosphate 6 mg in sodium chloride 0.9 % IVPB  6 mg Intravenous Q6H Cristino Encinas DO 0 mL/hr at 07/09/17 1245 6 mg at 07/11/17 0525   • dextromethorphan polistirex ER (DELSYM) 30 MG/5ML oral suspension 30 mg  30 mg Oral BID Robe Dorsey, DO   30 mg at 07/11/17 0810   • enoxaparin (LOVENOX) syringe 40 mg  40 mg Subcutaneous Daily Cristino Encinas DO   40 mg at 07/11/17 0809   •  famotidine (PEPCID) tablet 20 mg  20 mg Per G Tube Q12H Cristino Encinas, DO   20 mg at 07/11/17 0809   • HYDROcodone-acetaminophen (NORCO)  MG per tablet 1 tablet  1 tablet Oral Q4H PRN Jairon Perez DO   1 tablet at 07/03/17 1758   • HYDROcodone-acetaminophen (NORCO) 5-325 MG per tablet 1 tablet  1 tablet Oral Q4H PRN Jairon Perez DO       • HYDROmorphone (DILAUDID) injection 1 mg  1 mg Intravenous Q3H PRN Jairon Perez DO   1 mg at 07/10/17 1354    And   • naloxone (NARCAN) injection 0.4 mg  0.4 mg Intravenous Q5 Min PRN Jairon Perez DO       • ibuprofen (ADVIL,MOTRIN) 100 MG/5ML suspension 200 mg  200 mg Oral Q6H PRN Cristino Encinas, DO   200 mg at 07/09/17 1342   • levETIRAcetam in NaCl 0.75% (KEPPRA) IVPB 1,000 mg  1,000 mg Intravenous Q12H Royal Campos MD 0 mL/hr at 07/09/17 0930 1,000 mg at 07/11/17 0810   • levoFLOXacin (LEVAQUIN) 750 mg/150 mL D5W (premix) (LEVAQUIN) 750 mg  750 mg Intravenous Q24H Cristino Encinas DO       • LORazepam (ATIVAN) tablet 1 mg  1 mg Oral Q2H PRN Cristino Encinas DO        Or   • LORazepam (ATIVAN) injection 1 mg  1 mg Intravenous Q2H PRN Cristino Encinas DO        Or   • LORazepam (ATIVAN) tablet 2 mg  2 mg Oral Q1H PRN Cristino Encinas DO        Or   • LORazepam (ATIVAN) injection 2 mg  2 mg Intravenous Q1H PRN Cristino Encinas DO   2 mg at 07/09/17 0336    Or   • LORazepam (ATIVAN) injection 2 mg  2 mg Intravenous Q15 Min PRN Cristino Encinas DO        Or   • LORazepam (ATIVAN) injection 2 mg  2 mg Intramuscular Q15 Min PRN Cristino Encinas DO        Or   • LORazepam (ATIVAN) tablet 4 mg  4 mg Oral Q1H PRN Cristino Encinas DO        Or   • LORazepam (ATIVAN) injection 4 mg  4 mg Intravenous Q1H PRN Cristino Encinas DO   4 mg at 07/10/17 3272   • metoprolol tartrate (LOPRESSOR) tablet 100 mg  100 mg Nasogastric Q12H Cristino Encinas DO       • metroNIDAZOLE (FLAGYL) tablet 500 mg  500 mg Per G Tube Q8H Cristino LAGUNAS  Huang, DO   500 mg at 07/11/17 0808   • niCARdipine (CARDENE) 25 mg/250 mL (0.1 mg/mL) 0.9% NS infusion  5-15 mg/hr Intravenous Titrated Jairon Perez DO 50 mL/hr at 07/10/17 0448 5 mg/hr at 07/10/17 0448   • ondansetron (ZOFRAN) injection 4 mg  4 mg Intravenous Q6H PRN Jairon Perez DO   4 mg at 07/09/17 1715   • Pharmacy to dose vancomycin   Does not apply Continuous PRN Cristino Encinas DO       • piperacillin-tazobactam (ZOSYN) 3.375 g in iso-osmotic dextrose 50 ml (premix)  3.375 g Intravenous Q6H Cristino Encinas DO       • propofol (DIPRIVAN) infusion 10 mg/mL 100 mL  5-50 mcg/kg/min Intravenous Titrated Jairon Perez DO 37.3 mL/hr at 07/11/17 0906 75 mcg/kg/min at 07/11/17 0906   • sodium chloride 0.9 % flush 1-10 mL  1-10 mL Intravenous PRN Jairon Perez DO       • terazosin (HYTRIN) capsule 5 mg  5 mg Per G Tube Nightly Cristino Encinas DO       • thiamine (B-1) 100 mg in sodium chloride 0.9 % 100 mL IVPB  100 mg Intravenous Daily Jairon Perez  mL/hr at 07/11/17 0809 100 mg at 07/11/17 0809   • vancomycin 1250 mg/250 mL 0.9% NS IVPB ([facility])  1,250 mg Intravenous Q12H Cristino Encinas DO 0 mL/hr at 07/09/17 1000 1,250 mg at 07/11/17 0907       Assessment/Plan     Active Problems:    Angio-edema    Seizures    HCAP (healthcare-associated pneumonia)      Minimal improvement of tongue swelling after steroids and famotidine. Will discuss this patient with Dr. Pierson regarding considering FFP vs tracheostomy. Continue local care of tongue.       Beatriz Briggs, APRN  07/11/17  10:09 AM

## 2017-07-11 NOTE — PLAN OF CARE
Problem: Patient Care Overview (Adult)  Goal: Plan of Care Review    07/11/17 0702   Coping/Psychosocial Response Interventions   Plan Of Care Reviewed With family   Patient Care Overview   Progress no change   Outcome Evaluation   Outcome Summary/Follow up Plan Second dose of albumin/lasix given this shift. 3250cc UOP for 12 hours. Patient following commands when sedation paused. Copious amounts of secretions.          Problem: Pressure Ulcer Risk (Roman Scale) (Adult,Obstetrics,Pediatric)  Goal: Identify Related Risk Factors and Signs and Symptoms  Outcome: Outcome(s) achieved Date Met:  07/11/17

## 2017-07-11 NOTE — PROGRESS NOTES
Neurology Progress Note      Chief Complaint:  Seizure, Malignant HTN, PRES, EtOH w/d, Respiratory failure on Vent    Subjective     Subjective:    No neurologic changes today.  He is still requiring sedation because of agitation.  Dr. Encinas is decreasing antibiotics today to cover respiratory infections.    Medications:  Current Facility-Administered Medications   Medication Dose Route Frequency Provider Last Rate Last Dose   • acetaminophen (TYLENOL) tablet 650 mg  650 mg Oral Q4H PRN Jairon Perez, DO   650 mg at 07/09/17 1003   • albuterol (PROVENTIL) nebulizer solution 0.083% 2.5 mg/3mL  2.5 mg Nebulization Q6H - RT Jairon Perez DO   2.5 mg at 07/11/17 0629   • amLODIPine (NORVASC) tablet 5 mg  5 mg Per G Tube Q24H Cristino Encinas DO   5 mg at 07/11/17 0809   • chlorhexidine (PERIDEX) 0.12 % solution 15 mL  15 mL Mouth/Throat Q12H Cristino Encinas DO   15 mL at 07/11/17 0810   • CloNIDine (CATAPRES-TTS) 0.3 MG/24HR patch 1 patch  1 patch Transdermal Weekly Cristino Encinas DO       • dexamethasone sodium phosphate 6 mg in sodium chloride 0.9 % IVPB  6 mg Intravenous Q6H Cristino Encinas DO 0 mL/hr at 07/09/17 1245 6 mg at 07/11/17 0525   • dextromethorphan polistirex ER (DELSYM) 30 MG/5ML oral suspension 30 mg  30 mg Oral BID Robe Dorsey, DO   30 mg at 07/11/17 0810   • enoxaparin (LOVENOX) syringe 40 mg  40 mg Subcutaneous Daily Cristino Encinas DO   40 mg at 07/11/17 0809   • famotidine (PEPCID) tablet 20 mg  20 mg Per G Tube Q12H Cristino Encinas DO   20 mg at 07/11/17 0809   • HYDROcodone-acetaminophen (NORCO)  MG per tablet 1 tablet  1 tablet Oral Q4H PRN Jairon Perez DO   1 tablet at 07/03/17 1758   • HYDROcodone-acetaminophen (NORCO) 5-325 MG per tablet 1 tablet  1 tablet Oral Q4H PRN Jairon Perez, DO       • HYDROmorphone (DILAUDID) injection 1 mg  1 mg Intravenous Q3H PRN Jairon Perez DO   1 mg at 07/10/17 1354    And   • naloxone (NARCAN) injection 0.4  mg  0.4 mg Intravenous Q5 Min PRN Jairon Perez, DO       • ibuprofen (ADVIL,MOTRIN) 100 MG/5ML suspension 200 mg  200 mg Oral Q6H PRN Crisitno Encinas, DO   200 mg at 07/09/17 1342   • levETIRAcetam in NaCl 0.75% (KEPPRA) IVPB 1,000 mg  1,000 mg Intravenous Q12H Royal Campos MD 0 mL/hr at 07/09/17 0930 1,000 mg at 07/11/17 0810   • levoFLOXacin (LEVAQUIN) 750 mg/150 mL D5W (premix) (LEVAQUIN) 750 mg  750 mg Intravenous Q24H Cristino Encinas DO       • LORazepam (ATIVAN) tablet 1 mg  1 mg Oral Q2H PRN Cristino Encinas, DO        Or   • LORazepam (ATIVAN) injection 1 mg  1 mg Intravenous Q2H PRN Cristino Encinas, DO        Or   • LORazepam (ATIVAN) tablet 2 mg  2 mg Oral Q1H PRN Cristino Encinas DO        Or   • LORazepam (ATIVAN) injection 2 mg  2 mg Intravenous Q1H PRN Cristino Encinas, DO   2 mg at 07/09/17 0336    Or   • LORazepam (ATIVAN) injection 2 mg  2 mg Intravenous Q15 Min PRN Cristino Encinas DO        Or   • LORazepam (ATIVAN) injection 2 mg  2 mg Intramuscular Q15 Min PRN Cristino Encinas DO        Or   • LORazepam (ATIVAN) tablet 4 mg  4 mg Oral Q1H PRN Cristino Encinas DO        Or   • LORazepam (ATIVAN) injection 4 mg  4 mg Intravenous Q1H PRN Cristino Encinas, DO   4 mg at 07/10/17 1659   • metoprolol tartrate (LOPRESSOR) tablet 100 mg  100 mg Nasogastric Q12H Cristino Encinas DO       • metroNIDAZOLE (FLAGYL) tablet 500 mg  500 mg Per G Tube Q8H Cristino Encinas, DO   500 mg at 07/11/17 0808   • niCARdipine (CARDENE) 25 mg/250 mL (0.1 mg/mL) 0.9% NS infusion  5-15 mg/hr Intravenous Titrated Jairon Perez, DO 50 mL/hr at 07/10/17 0448 5 mg/hr at 07/10/17 0448   • ondansetron (ZOFRAN) injection 4 mg  4 mg Intravenous Q6H PRN Jairon Perez DO   4 mg at 07/09/17 1715   • Pharmacy to dose vancomycin   Does not apply Continuous PRN Cristino Encinas DO       • piperacillin-tazobactam (ZOSYN) 3.375 g in iso-osmotic dextrose 50 ml (premix)  3.375 g Intravenous Q6H  Cristino Encinas DO       • propofol (DIPRIVAN) infusion 10 mg/mL 100 mL  5-50 mcg/kg/min Intravenous Titrated Jairon Perez DO 37.3 mL/hr at 07/11/17 0906 75 mcg/kg/min at 07/11/17 0906   • sodium chloride 0.9 % flush 1-10 mL  1-10 mL Intravenous PRN Jairon Perez DO       • terazosin (HYTRIN) capsule 5 mg  5 mg Per G Tube Nightly Cristino Encinas DO       • thiamine (B-1) 100 mg in sodium chloride 0.9 % 100 mL IVPB  100 mg Intravenous Daily Jairon Perez  mL/hr at 07/11/17 0809 100 mg at 07/11/17 0809   • vancomycin 1250 mg/250 mL 0.9% NS IVPB ([facility])  1,250 mg Intravenous Q12H Cristino Encinas DO 0 mL/hr at 07/09/17 1000 1,250 mg at 07/11/17 0907       Review of Systems:   Could not obtain      Objective      Vital Signs  Temp:  [98.7 °F (37.1 °C)-99.2 °F (37.3 °C)] 99.2 °F (37.3 °C)  Heart Rate:  [] 85  Resp:  [12-22] 21  BP: (133-206)/() 191/116  FiO2 (%):  [40 %] 40 %    Physical Exam:  Heavily sedated  --We will squeeze hands to command right greater than left  NC/AT, tongue remains swollen  Sclera anicteric  Coarse BS bilaterally  RRR  Abdomen distended  4+ edema in feet and hands    Neuro:  Awakens to loud voice and pain.  Opens eyes  --Squeezes hand and wiggles toes  --Will track some with eyes  --PERRL  --Positive Blink  --Moving all extremities, more so off sedation  --No w/d to pain currently  --DTRs absent throughout, equivocal Babinksi     Results Review:    I reviewed the patient's new clinical results.      Results from last 7 days  Lab Units 07/08/17  1057 07/06/17  0222 07/05/17  0158   WBC 10*3/mm3 12.30* 9.27 15.95*   HEMOGLOBIN g/dL 10.4* 12.1* 12.8*   HEMATOCRIT % 31.5* 35.4* 38.1*   PLATELETS 10*3/mm3 199 216 246          Results from last 7 days  Lab Units 07/11/17  0142 07/10/17  0338 07/09/17  0348   SODIUM mmol/L 147* 144 143   POTASSIUM mmol/L 3.7 4.1 3.7   CHLORIDE mmol/L 109 111* 111*   CO2 mmol/L 25.0 24.0 22.0*   BUN mg/dL 21 13 11   CREATININE mg/dL  1.07 0.98 0.91   CALCIUM mg/dL 8.9 8.6 8.0*   BILIRUBIN mg/dL  --  0.5  --    ALK PHOS U/L  --  155*  --    ALT (SGPT) U/L  --  42  --    AST (SGOT) U/L  --  51*  --    GLUCOSE mg/dL 143* 182* 108*        Lab Results   Component Value Date    MG 2.1 07/06/2017     No components found for: POCGLUC  No components found for: A1C  Lab Results   Component Value Date    HDL 36 02/04/2016     No components found for: B12  No results found for: TSH     Labs reviewed    Assessment/Plan     Hospital Problem List    Active Problems:    Angio-edema    Seizures    HCAP (healthcare-associated pneumonia)    Impression  52 year old admitted with seizures, malignant HTN, PRES, EtOH w/d. Agitation, intubated, sedated, C.diff, low grade temps      Plan  1. PRES  --BPS high again.  Primary team continues to titrate medications  --EEG showed no evidence of status. --On Keppra to 1000mg IV BID.  Will remain on this until MRI normal, recheck in 6 weeks  --No signs of CNS infection      2. DTs  --On Ativan, Propofol  --Intubated  --On Thiamine, FA  --Wean as tolerated     3. C.diff  --likely source of fevers  --On Flagyl      4. Tongue swelling: Appears about the same. Intubated currently    5.  GI:  Jevitiy 1.2 at 50/hr TF    6.  Resp:  Intubated, on Vent  --considering trach        Spoke with family.  Over 35 min face to face with family in counseling.       Harpreet Arroyo MD  07/11/17  9:59 AM

## 2017-07-11 NOTE — PLAN OF CARE
Problem: Patient Care Overview (Adult)  Goal: Plan of Care Review  Outcome: Ongoing (interventions implemented as appropriate)    07/11/17 0832   Coping/Psychosocial Response Interventions   Plan Of Care Reviewed With patient   Patient Care Overview   Progress no change   Outcome Evaluation   Outcome Summary/Follow up Plan Pt. continues to be sedated on vent. Performed PROM and stretrching to all extremities. Will continue to work with pt. to prevent contractures while pt. on vent.

## 2017-07-11 NOTE — PLAN OF CARE
Problem: Patient Care Overview (Adult)  Goal: Plan of Care Review  Outcome: Ongoing (interventions implemented as appropriate)    07/11/17 6287   Coping/Psychosocial Response Interventions   Plan Of Care Reviewed With family;daughter   Patient Care Overview   Progress progress toward functional goals as expected       Goal: Adult Individualization and Mutuality  Outcome: Ongoing (interventions implemented as appropriate)  Goal: Discharge Needs Assessment  Outcome: Ongoing (interventions implemented as appropriate)    Problem: Seizure Disorder/Epilepsy (Adult)  Goal: Signs and Symptoms of Listed Potential Problems Will be Absent or Manageable (Seizure Disorder/Epilepsy)  Outcome: Ongoing (interventions implemented as appropriate)    Problem: Fall Risk (Adult)  Goal: Absence of Falls  Outcome: Ongoing (interventions implemented as appropriate)    Problem: Pressure Ulcer Risk (Roman Scale) (Adult,Obstetrics,Pediatric)  Goal: Skin Integrity  Outcome: Ongoing (interventions implemented as appropriate)    Problem: SAFETY - NON-VIOLENT RESTRAINT  Goal: Remains free of injury from restraints (Non-Violent Restraint)  Outcome: Ongoing (interventions implemented as appropriate)  Goal: Free from restraint(s) (Non-Violent Restraint)  Outcome: Ongoing (interventions implemented as appropriate)

## 2017-07-11 NOTE — PROGRESS NOTES
PULMONARY AND CRITICAL CARE PROGRESS NOTE - [facility]    Patient: Nishant Savage    1965    MR# [id number]    Acct# [id number]  07/11/17   8:38 AM  Referring Provider: Cristino Encinas DO    Chief Complaint: Mechanically ventilated    Interval history: Remains on ventilator with propofol gtt infusing.  Cardene is currently off.  He will open his eyes and follow some commands despite propofol.  Abdomen remains distended, somewhat softer after diuresis.  Still with purulent oral secretions.  Tongue is still bruised and edematous protruding form mouth, but does look slightly less swollen today.  Currently FiO2 40% with O2 sat 96%.  No other aggravating or allevitating factors.       Meds:    albuterol 2.5 mg Nebulization Q6H - RT   amLODIPine 5 mg Per G Tube Q24H   ceftriaxone 2 g Intravenous Q12H   chlorhexidine 15 mL Mouth/Throat Q12H   CloNIDine 1 patch Transdermal Weekly   dexamethasone 6 mg Intravenous Q6H   dextromethorphan polistirex ER 30 mg Oral BID   enoxaparin 40 mg Subcutaneous Daily   famotidine 20 mg Per G Tube Q12H   levETIRAcetam 1,000 mg Intravenous Q12H   metoprolol tartrate 50 mg Nasogastric Q12H   metroNIDAZOLE 500 mg Per G Tube Q8H   thiamine (VITAMIN B1) IVPB 100 mg Intravenous Daily   vancomycin 1,250 mg Intravenous Q12H       niCARdipine 5-15 mg/hr Last Rate: 5 mg/hr (07/10/17 0448)   Pharmacy to dose vancomycin     propofol 5-50 mcg/kg/min Last Rate: 75 mcg/kg/min (07/11/17 0609)     Review of Systems:   Cannot obtain due to mechanical ventilation.  The patient notably is critically ill and connected to a ventilator.  As such patient cannot communicate and provide any history whatsoever, including any history of present illness or interval history since arrival or review of systems. The interested reviewer may note this fact, as an attempt has been made at collecting and documenting these portions of the patient history, but this information is unobtainable despite  attempted review and therefore cannot be documented at this time.     Ventilator Settings:  Vent Mode: VC/AC  Vt (Set, L): 0.65 L  Resp Rate (Set): 10  Pressure Support (cm H2O): 0 cm H20  FiO2 (%): 40 %  PEEP/CPAP (cm H2O): 10 cm H20  Minute Ventilation (L/min) (Obs): 15.4 L/min  Resp Rate (Observed) Vent: 23  I:E Ratio (Set): 1:1.30  I:E Ratio (Obs): 1:1.3  PIP Observed (cm H2O): 31 cm H2O  RSBI: 202    Physical Exam:  Temp:  [98.7 °F (37.1 °C)-99.2 °F (37.3 °C)] 99.2 °F (37.3 °C)  Heart Rate:  [] 85  Resp:  [12-22] 21  BP: (133-206)/() 191/116  FiO2 (%):  [40 %] 40 %    Intake/Output Summary (Last 24 hours) at 07/11/17 0838  Last data filed at 07/11/17 0400   Gross per 24 hour   Intake          2722.37 ml   Output             7100 ml   Net         -4377.63 ml     SpO2 Readings from Last 3 Encounters:   07/11/17 98%   09/30/16 98%   02/04/16 96%     Physical Exam   Gen: Sedated and intubated on mechanical ventilatory support.  HEENT: Endotracheal tube is in place. Tongue is swollen and bruised. Oral secretions.   Neck: Supple.  Respiratory: Fair air movement bilaterally, mild rhonchi today, rales have improved today.   Cardiac: Sinus rhythm. No murmur or gallop noted.   Abdomen: firmer than previous assessments, distended, bowel sounds present.   Neurologic: He has been opening eyes and following commands per nursing.   Psychiatric: Cannot assess as he is sedated and intubated.  Dermatologic: No rash noted.  Extremities: +generalized edema, SCDs are in place.  Musculoskeletal: No joint deformities noted.  Lymphatic: No adenopathy palpated    Results from last 7 days  Lab Units 07/08/17  1057 07/06/17  0222 07/05/17  0158   WBC 10*3/mm3 12.30* 9.27 15.95*   HEMOGLOBIN g/dL 10.4* 12.1* 12.8*   PLATELETS 10*3/mm3 199 216 246       Results from last 7 days  Lab Units 07/11/17  0142 07/10/17  0338 07/09/17  0348   SODIUM mmol/L 147* 144 143   POTASSIUM mmol/L 3.7 4.1 3.7   BUN mg/dL 21 13 11   CREATININE  mg/dL 1.07 0.98 0.91       Results from last 7 days  Lab Units 07/11/17  0327 07/10/17  0255 07/09/17  0159   PH, ARTERIAL pH units 7.495* 7.443 7.417   PCO2, ARTERIAL mm Hg 31.1* 32.9* 36.3   PO2 ART mm Hg 76.1* 55.9* 80.8   FIO2 % 40 30 30        Recent films:  Imaging Results (last 24 hours)     Procedure Component Value Units Date/Time    XR Abdomen KUB [[id number]] Collected:  07/10/17 1314     Updated:  07/10/17 1319    Narrative:       HISTORY: Abdomen distended and firm.     FINDINGS: KUB radiograph demonstrates a nonspecific bowel gas pattern.  There is noted to be a distended loop of what I suspect represents the  sigmoid colon with a sigmoid volvulus in the differential. I do not see  evidence of obstruction or pneumatosis. There is no evidence of  pneumoperitoneum.       Impression:       . Nonspecific bowel gas pattern with gas in both small bowel  and colon. There is some asymmetric distention of what appears to be a  loop of the sigmoid colon with a sigmoid volvulus a differential  although considered unlikely given the lack of distention of the more  proximal bowel and the patient's age. If symptoms are persistent follow  up with CT imaging could be obtained for further characterization.  This report was finalized on 07/10/2017 13:16 by Dr. Dilshad Silver MD.    CT Abdomen Pelvis Without Contrast [[id number]] Collected:  07/10/17 2035     Updated:  07/10/17 2047    Narrative:       EXAMINATION: CT ABDOMEN PELVIS WO CONTRAST- 7/10/2017 8:35 PM CDT     HISTORY: Abdominal distention and pain, possible sigmoid volvulus.     DOSE: 1821 mGycm (Automatic exposure control technique was implemented  in an effort to keep the radiation dose as low as possible without  compromising image quality)     REPORT: Spiral CT of the abdomen and pelvis was performed without  intravenous or oral contrast from the lung bases through the pubic  symphysis. Reconstructed coronal and sagittal images are also  reviewed.  Comparison: KUB on the same date. There is no previous CT. Lung windows  demonstrate consolidation of the lower lobe laterally with air  bronchograms, probable bilateral pneumonia mixed with atelectasis. There  are tiny bilateral pleural effusions. A nasogastric tube is present in  good position. There is mild cardiomegaly. The liver and spleen appear  homogeneous. The gallbladder is slightly contracted. Pancreas and  adrenal glands are within normal limits. No nephrolithiasis is seen and  there is no hydronephrosis. There is perinephric fat stranding  bilaterally, which is nonspecific. There is no evidence of bowel  obstruction or sigmoid volvulus. The sigmoid colon is redundant. There  is moderate sigmoid diverticulosis. There is nonspecific increased  attenuation of fat planes in the pelvis, including presacral fat planes  and fat planes at the level of the aortic bifurcation and inguinal  regions. There is also mildly increased attenuation of fat planes over  the flanks, slightly greater on the right. There are fat containing  inguinal hernias bilaterally. No intra-abdominal abscess or free air is  identified. The inflammatory changes do not appear to be associated with  the sigmoid colon. The appendix is normal. There is a small  fat-containing periumbilical hernia that measures 2.6 cm. Review of bone  windows is unremarkable.       Impression:       1. No evidence of bowel obstruction or sigmoid volvulus. There is  moderate sigmoid diverticulosis without acute diverticulitis.  2. Nonspecific mild fat stranding/inflammation within the  retroperitoneum and extraperitoneal fat planes in the pelvis, without  evidence of an abscess.  3. Extensive infiltrates in the lower lobes with air bronchograms  suspicious for acute lateral lower lobe pneumonia probably combined with  atelectasis.        This report was finalized on 07/10/2017 20:44 by Dr. Marvin Mar MD.    XR Chest 1 View [[id number]] Collected:   07/11/17 0722     Updated:  07/11/17 0726    Narrative:       EXAMINATION:   XR CHEST 1 VW-  7/11/2017 7:22 AM CDT     HISTORY: Respiratory failure.     Frontal upright radiograph of the chest 7/11/2017 3:15 AM CDT     COMPARISON: 07/10/2017.     FINDINGS:   The lungs are clear. Cardiac silhouettes mildly enlarged. Left  subclavian catheter and endotracheal tube are present and satisfactorily  positioned.      The osseous structures and surrounding soft tissues demonstrate no acute  abnormality.       Impression:       1. No radiographic evidence of acute cardiopulmonary process.        This report was finalized on 07/11/2017 07:23 by Dr. Adolph Echols MD.        Films reviewed personally by me.  My interpretation: CXR remains stable     Pulmonary Assessment:  1. Respiratory failure related to agitation from ethanol withdrawal.  2. Malignant HTN   3. PRES   4. Recent problems with angioedema which are improving   5. History of chronic ethanol use  6. C-diff   7. Tongue hematoma  8. MSSA respiratory culture       Recommend:   · Continue current mechanical ventilation.  Not candidate for weaning trials 2' to neuro status, hypertension, and tongue swelling.  Most likely going to require a tracheostomy.       · Volume status much improved today after albumin/lasix per attending. net I/O -5L vs +9 yesterday     Electronically signed by EPI Manzo on 7/11/2017 at 8:38 AM    Physician substantive contribution:  Pertinent symptoms/interval history include: He remains intubated.  Again he is somewhat responsive even with sedation ongoing.  I suspect his bibasilar infiltrates on abdominal CT represent predominantly atelectasis.  Respiratory exam shows pertinent findings of fair air movement on chest exam.  Plan includes: Even if his sedation can be weaned further he would be a poor candidate for a weaning to extubation trial because his upper airway issues at present.  I have seen and examined patient  personally, performing a face-to-face diagnostic evaluation with plan of care reviewed and developed with APRN and nursing staff. I have addended and/or modified the above history of present illness, physical examination, and assessment and plan to reflect my findings and impressions. Essential elements of the care plan were discussed with APRN above.  Agree with findings and assessment/plan as documented above.    Electronically signed by Indra Gallo MD, on 7/11/2017, 3:18 PM

## 2017-07-11 NOTE — PROGRESS NOTES
[facility] Medicine Services  INPATIENT PROGRESS NOTE    Length of Stay: 7  Date of Admission: 7/3/2017  Primary Care Physician: Linda Hoffman, DNP, APRN    Subjective     Chief Complaint:     The patient is intubated and sedated    HPI     The patient was successfully diuresed yesterday over 5 L.  CT scan of the abdomen shows no evidence of volvulus or acute intra-abdominal process but extensive infiltrates in the lower lobes with air bronchograms were noted (CXR same day shows only atelectasis).  Blood pressure remains labile.  Cardene drip has been discontinued.  The patient has low-grade temperature at 99.2°.  Dr. Arroyo of neurology and I had long discussion with the family today.  He had multiple questions and the questions were answered.  I think it is evident that the patient requires a tracheostomy.  He continues to become agitated when sedation is decreased.        Review of Systems     All pertinent negatives and positives are as above. All other systems have been reviewed and are negative unless otherwise stated.     Objective    Temp:  [98.7 °F (37.1 °C)-99.2 °F (37.3 °C)] 99.2 °F (37.3 °C)  Heart Rate:  [] 85  Resp:  [12-22] 21  BP: (133-206)/() 191/116  FiO2 (%):  [40 %] 40 %    Lab Results (last 24 hours)     Procedure Component Value Units Date/Time    Basic Metabolic Panel [[id number]]  (Abnormal) Collected:  07/11/17 0142    Specimen:  Blood Updated:  07/11/17 0222     Glucose 143 (H) mg/dL      BUN 21 mg/dL      Creatinine 1.07 mg/dL      Sodium 147 (H) mmol/L      Potassium 3.7 mmol/L      Chloride 109 mmol/L      CO2 25.0 mmol/L      Calcium 8.9 mg/dL      eGFR Non African Amer 73 mL/min/1.73      BUN/Creatinine Ratio 19.6     Anion Gap 13.0 mmol/L     Blood Gas, Arterial [[id number]]  (Abnormal) Collected:  07/11/17 0327    Specimen:  Arterial Blood Updated:  07/11/17 0330     Site Arterial: right radial     Gera's Test --      Documented in  Rapid Comm        pH, Arterial 7.495 (H) pH units      pCO2, Arterial 31.1 (L) mm Hg      pO2, Arterial 76.1 (L) mm Hg      HCO3, Arterial 23.4 mmol/L      Base Excess, Arterial 1.1 mmol/L      O2 Saturation, Arterial 96.3 %      O2 Saturation Calculated 96.3 %      Barometric Pressure for Blood Gas -- mmHg       Component not reported at this site.        Modality Ventilator     FIO2 40 %      Ventilator Mode AC     Rate 10.0 Breaths/minute      PEEP 10.0     Vent CPAP/PEEP 10.0    Narrative:       Serial Number: [id number]    : [id number]          Imaging Results (last 24 hours)     Procedure Component Value Units Date/Time    XR Abdomen KUB [[id number]] Collected:  07/10/17 1314     Updated:  07/10/17 1319    Narrative:       HISTORY: Abdomen distended and firm.     FINDINGS: KUB radiograph demonstrates a nonspecific bowel gas pattern.  There is noted to be a distended loop of what I suspect represents the  sigmoid colon with a sigmoid volvulus in the differential. I do not see  evidence of obstruction or pneumatosis. There is no evidence of  pneumoperitoneum.       Impression:       . Nonspecific bowel gas pattern with gas in both small bowel  and colon. There is some asymmetric distention of what appears to be a  loop of the sigmoid colon with a sigmoid volvulus a differential  although considered unlikely given the lack of distention of the more  proximal bowel and the patient's age. If symptoms are persistent follow  up with CT imaging could be obtained for further characterization.  This report was finalized on 07/10/2017 13:16 by Dr. Dilshad Silver MD.    CT Abdomen Pelvis Without Contrast [[id number]] Collected:  07/10/17 2035     Updated:  07/10/17 2047    Narrative:       EXAMINATION: CT ABDOMEN PELVIS WO CONTRAST- 7/10/2017 8:35 PM CDT     HISTORY: Abdominal distention and pain, possible sigmoid volvulus.     DOSE: 1821 mGycm (Automatic exposure control technique was implemented  in an effort to keep  the radiation dose as low as possible without  compromising image quality)     REPORT: Spiral CT of the abdomen and pelvis was performed without  intravenous or oral contrast from the lung bases through the pubic  symphysis. Reconstructed coronal and sagittal images are also reviewed.  Comparison: KUB on the same date. There is no previous CT. Lung windows  demonstrate consolidation of the lower lobe laterally with air  bronchograms, probable bilateral pneumonia mixed with atelectasis. There  are tiny bilateral pleural effusions. A nasogastric tube is present in  good position. There is mild cardiomegaly. The liver and spleen appear  homogeneous. The gallbladder is slightly contracted. Pancreas and  adrenal glands are within normal limits. No nephrolithiasis is seen and  there is no hydronephrosis. There is perinephric fat stranding  bilaterally, which is nonspecific. There is no evidence of bowel  obstruction or sigmoid volvulus. The sigmoid colon is redundant. There  is moderate sigmoid diverticulosis. There is nonspecific increased  attenuation of fat planes in the pelvis, including presacral fat planes  and fat planes at the level of the aortic bifurcation and inguinal  regions. There is also mildly increased attenuation of fat planes over  the flanks, slightly greater on the right. There are fat containing  inguinal hernias bilaterally. No intra-abdominal abscess or free air is  identified. The inflammatory changes do not appear to be associated with  the sigmoid colon. The appendix is normal. There is a small  fat-containing periumbilical hernia that measures 2.6 cm. Review of bone  windows is unremarkable.       Impression:       1. No evidence of bowel obstruction or sigmoid volvulus. There is  moderate sigmoid diverticulosis without acute diverticulitis.  2. Nonspecific mild fat stranding/inflammation within the  retroperitoneum and extraperitoneal fat planes in the pelvis, without  evidence of an  abscess.  3. Extensive infiltrates in the lower lobes with air bronchograms  suspicious for acute lateral lower lobe pneumonia probably combined with  atelectasis.        This report was finalized on 07/10/2017 20:44 by Dr. Marvin Mar MD.    XR Chest 1 View [[id number]] Collected:  07/11/17 0722     Updated:  07/11/17 0726    Narrative:       EXAMINATION:   XR CHEST 1 VW-  7/11/2017 7:22 AM CDT     HISTORY: Respiratory failure.     Frontal upright radiograph of the chest 7/11/2017 3:15 AM CDT     COMPARISON: 07/10/2017.     FINDINGS:   The lungs are clear. Cardiac silhouettes mildly enlarged. Left  subclavian catheter and endotracheal tube are present and satisfactorily  positioned.      The osseous structures and surrounding soft tissues demonstrate no acute  abnormality.       Impression:       1. No radiographic evidence of acute cardiopulmonary process.        This report was finalized on 07/11/2017 07:23 by Dr. Adolph Echols MD.             Intake/Output Summary (Last 24 hours) at 07/11/17 0933  Last data filed at 07/11/17 0400   Gross per 24 hour   Intake          2722.37 ml   Output             7100 ml   Net         -4377.63 ml       Physical Exam    Constitutional: No distress.   Seen and discussed with his nurse, Ginny. No family present. He responds to Ativan plus Diprivan sedation well. ETT secure, Central line present.    HENT:   Head: Normocephalic and atraumatic.   Eyes: Pupils are equal, round, and reactive to light. The patient's tongue edema is reduced by about 20%.  It remains bruised and protruding.   Neck: Neck supple. No JVD present.   Cardiovascular: Normal rate and regular rhythm.  Diffuse edema is noted in both the upper and lower extremities.  Pulmonary/Chest: Effort normal and breath sounds normal.   Ventilated.    Abdominal: Soft. Bowel sounds are normal.   Musculoskeletal: He exhibits edema (trace).   Neurological:   Currently he is sedated with propofol and Ativan. Discussed case  with ENT. Not appropriate for extubation because of his lingual swelling and extreme agitation when sedation is weaned. Patient is more responsive and will follow commands such as moving feet and hands but still becomes agitated off sedation  Awakens to loud voice and pain. Opens eyes  --Squeezes hand and wiggles toes  --Will track some with eyes  --PERRL  --Positive Blink  --Moving all extremities, more so off sedation  --No w/d to pain currently  --DTRs absent throughout, equivocal [place]  Skin: Skin is warm and dry.      Results Review:  I have reviewed the labs, radiology results, and diagnostic studies since my last progress note and made treatment changes reflective of the results.   I have reviewed the current medications.    Assessment/Plan     Hospital Problem List     Angio-edema    Seizures    HCAP (healthcare-associated pneumonia)            1. Possible angioedema related to lisinopril.  2. Malignant hypertension.  3. Possible new onset seizure disorder versus convulsive syncope.  4. Posterior reversible encephalopathy syndrome on MRI.  5. Tongue bite with sublingual hematoma.  6. Tobacco abuse.  7. Obstructive sleep apnea, noncompliant with device.  8. Gouty arthritis.  9. Daily alcohol use with Delirium tremens requiring sedation and mechanical ventilation.  10. Hypokalemia.   11. C. Difficile colitilis  12. Lingual or sublingual infection, MSSA  13. Anasarca      PLAN:  De-escalate antibiotics for possible meningeal infection  Continue antibiotics for pneumonia with positive MRSA cultures based on CT findings.  Continue vancomycin and Flagyl  Discontinue Rocephin  Add Zosyn and Levaquin to current regimen.  Increase Catapres to TTS-3  Increase metoprolol to 100 mg every 12 hours  Add Hytrin 5mg per G tube daily  Trach recommended at ENT's earliest opportunity  Lasix 40 mg IV every 12 hours ×2 doses  Restart tube feeding at previous rate.    Cristino Encinas,    07/11/17   9:33 AM      Approximately 50 minutes of critical care time were spent managing the patient exclusive of billable procedures.

## 2017-07-11 NOTE — THERAPY TREATMENT NOTE
Acute Care - Physical Therapy Treatment Note  [facility]     Patient Name: Nishant Savage  : 1965  MRN: [id number]  Today's Date: 2017  Onset of Illness/Injury or Date of Surgery Date: 17  Date of Referral to PT: 17  Referring Physician: Dr. Encinas    Admit Date: 7/3/2017    Visit Dx:    ICD-10-CM ICD-9-CM   1. Oropharyngeal dysphagia R13.12 787.22   2. Seizures R56.9 780.39   3. Angioedema, sequela T78.3XXS 909.9   4. Alcohol withdrawal, with delirium F10.231 291.0   5. Impaired mobility Z74.09 799.89     Patient Active Problem List   Diagnosis   • Hyperlipidemia   • HTN (hypertension)   • Gout   • Anxiety   • Arthritis   • Erectile dysfunction   • Angio-edema   • Seizures               Adult Rehabilitation Note       17 0832          Rehab Assessment/Intervention    Discipline physical therapy assistant  -      Document Type therapy note (daily note)  -      Subjective Information unable to respond   pt's sedation off near end of tx and pt. waking up slightly  -      Precautions/Limitations oxygen therapy device and L/min   vent, restraints, c-diff  -MF      Recorded by [] Uma Restrepo PTA      Pain Assessment    Pain Assessment Rivera-Baker FACES  -      Rivera-Baker FACES Pain Rating 0  -MF      Recorded by [] Uma Restrepo PTA      Therapy Exercises    Bilateral Lower Extremities PROM:;20 reps;supine;ankle pumps/circles;calf stretch;heel slides;hip abduction/adduction;hip IR;SLR  -      Bilateral Upper Extremity PROM:;20 reps;supine;elbow flexion/extension;hand pumps;shoulder abduction/adduction;shoulder extension/flexion  -      Recorded by [] Uma Restrepo PTA      Positioning and Restraints    Pre-Treatment Position in bed  -      Post Treatment Position bed  -MF      In Bed fowlers;notified nsg;call light within reach;patient within staff view;side rails up x2;SCD pump applied  -      Restraints released:;reapplied:;notified nsg:  -       Recorded by [] Uma Restrepo PTA        User Key  (r) = Recorded By, (t) = Taken By, (c) = Cosigned By    Initials Name Effective Dates     Uma Restrepo PTA 08/02/16 -                 IP PT Goals       07/10/17 0810          Bed Mobility PT LTG    Bed Mobility PT LTG, Date Established 07/10/17  -MARJORIE      Bed Mobility PT LTG, Time to Achieve by discharge  -MARJORIE      Bed Mobility PT LTG, Activity Type roll left/roll right  -MARJORIE      Bed Mobility PT LTG, [place] Level moderate assist (50% patient effort)  -MARJORIE      Bed Mobility PT Goal  LTG, Assist Device bed rails  -MARJORIE      Strength Goal PT LTG    Strength Goal PT LTG, Date Established 07/10/17  -MARJORIE      Strength Goal PT LTG, Time to Achieve by discharge  -MARJORIE      Strength Goal PT LTG, Measure to Achieve AAROM/AROM, B UE/LE, 10-20 reps, min assist  -MARJORIE      Physical Therapy PT LTG    Physical Therapy PT LTG, Date Established 07/10/17  -MARJORIE      Physical Therapy PT LTG, Time to Achieve by discharge  -MARJORIE      Physical Therapy PT LTG, Activity Type No new evidence of skin breakdown or contractures while pt. on the vent.   -MARJORIE        User Key  (r) = Recorded By, (t) = Taken By, (c) = Cosigned By    Initials Name Provider Type    MARJORIE Olsen, PT DPT Physical Therapist          Physical Therapy Education     Title: PT OT SLP Therapies (Active)     Topic: Physical Therapy (Active)     Point: Mobility training (Active)    Learning Progress Summary    Learner Readiness Method Response Comment Documented by Status   Patient Nonacceptance E NL Pt. sedated on vent, no evidence of learning.  07/11/17 0905 Active               Point: Home exercise program (Active)    Learning Progress Summary    Learner Readiness Method Response Comment Documented by Status   Patient Nonacceptance E NL Pt. sedated on vent, no evidence of learning.  07/11/17 0905 Active    Acceptance E NR Educated pt on progression of PT POC and benefits of activity  07/10/17 0810  Active               Point: Body mechanics (Active)    Learning Progress Summary    Learner Readiness Method Response Comment Documented by Status   Patient Nonacceptance E NL Pt. sedated on vent, no evidence of learning.  07/11/17 0905 Active               Point: Precautions (Active)    Learning Progress Summary    Learner Readiness Method Response Comment Documented by Status   Patient Nonacceptance E NL Pt. sedated on vent, no evidence of learning.  07/11/17 0905 Active                      User Key     Initials Effective Dates Name Provider Type Discipline     08/02/16 -  Jaime Olsen, PT DPT Physical Therapist PT     08/02/16 -  Uma Restrepo PTA Physical Therapy Assistant PT                    PT Recommendation and Plan  Anticipated Discharge Disposition:  (unknown while on the vent)  Planned Therapy Interventions: (S) bed mobility training, balance training, home exercise program, patient/family education, ROM (Range of Motion), strengthening (PT will re-eval to update POC/goals once extubated. )  PT Frequency: daily, 2 times/day  Plan of Care Review  Plan Of Care Reviewed With: patient  Progress: no change  Outcome Summary/Follow up Plan: Pt. continues to be sedated on vent. Performed PROM and stretrching to all extremities. Will continue to work with pt. to prevent contractures while pt. on vent.          Outcome Measures       07/11/17 0832 07/10/17 0810       How much help from another person do you currently need...    Turning from your back to your side while in flat bed without using bedrails? 1  - 1  -MARJORIE     Moving from lying on back to sitting on the side of a flat bed without bedrails? 1  - 1  -MARJORIE     Moving to and from a bed to a chair (including a wheelchair)? 1  - 1  -MARJORIE     Standing up from a chair using your arms (e.g., wheelchair, bedside chair)? 1  -MF 1  -MARJORIE     Climbing 3-5 steps with a railing? 1  -MF 1  -MARJORIE     To walk in hospital room? 1  -MF 1  -MARJORIE     AM-PAC 6  Clicks Score 6  -MF 6  -MARJORIE     Functional Assessment    Outcome Measure Options AM-PAC 6 Clicks Basic Mobility (PT)  -MF AM-PAC 6 Clicks Basic Mobility (PT)  -MARJORIE       User Key  (r) = Recorded By, (t) = Taken By, (c) = Cosigned By    Initials Name Provider Type    MARJORIE Olsen, PT DPT Physical Therapist     Uma Restrepo PTA Physical Therapy Assistant           Time Calculation:         PT Charges       07/11/17 0907          Time Calculation    Start Time 0832  -      Stop Time 0900  -      Time Calculation (min) 28 min  -      PT Non-Billable Time (min) 0 min  -      PT Received On 07/11/17  -      PT Goal Re-Cert Due Date 07/20/17  -      Time Calculation- PT    Total Timed Code Minutes- PT 28 minute(s)  -        User Key  (r) = Recorded By, (t) = Taken By, (c) = Cosigned By    Initials Name Provider Type     Uma Restrepo PTA Physical Therapy Assistant          Therapy Charges for Today     Code Description Service Date Service Provider Modifiers Qty    62623409596 HC PT THER PROC EA 15 MIN 7/11/2017 Uma Restrepo PTA GP 2          PT G-Codes  Outcome Measure Options: AM-PAC 6 Clicks Basic Mobility (PT)  Score: 6  Functional Limitation: Mobility: Walking and moving around  Mobility: Walking and Moving Around Current Status (): 100 percent impaired, limited or restricted  Mobility: Walking and Moving Around Goal Status (): At least 80 percent but less than 100 percent impaired, limited or restricted    Uma Restrepo PTA  7/11/2017

## 2017-07-12 ENCOUNTER — APPOINTMENT (OUTPATIENT)
Dept: GENERAL RADIOLOGY | Facility: HOSPITAL | Age: 52
End: 2017-07-12

## 2017-07-12 ENCOUNTER — ANESTHESIA (OUTPATIENT)
Dept: PERIOP | Facility: HOSPITAL | Age: 52
End: 2017-07-12

## 2017-07-12 ENCOUNTER — APPOINTMENT (OUTPATIENT)
Dept: ULTRASOUND IMAGING | Facility: HOSPITAL | Age: 52
End: 2017-07-12

## 2017-07-12 ENCOUNTER — ANESTHESIA EVENT (OUTPATIENT)
Dept: PERIOP | Facility: HOSPITAL | Age: 52
End: 2017-07-12

## 2017-07-12 ENCOUNTER — PREP FOR SURGERY (OUTPATIENT)
Dept: OTHER | Facility: HOSPITAL | Age: 52
End: 2017-07-12

## 2017-07-12 DIAGNOSIS — T78.3XXD ANGIOEDEMA, SUBSEQUENT ENCOUNTER: Primary | ICD-10-CM

## 2017-07-12 LAB
ANION GAP SERPL CALCULATED.3IONS-SCNC: 13 MMOL/L (ref 4–13)
ARTERIAL PATENCY WRIST A: ABNORMAL
ATMOSPHERIC PRESS: ABNORMAL MMHG
BASE EXCESS BLDA CALC-SCNC: 4.6 MMOL/L (ref -2–2)
BDY SITE: ABNORMAL
BUN BLD-MCNC: 29 MG/DL (ref 5–21)
BUN/CREAT SERPL: 27.4 (ref 7–25)
CALCIUM SPEC-SCNC: 9 MG/DL (ref 8.4–10.4)
CHLORIDE SERPL-SCNC: 106 MMOL/L (ref 98–110)
CO2 SERPL-SCNC: 28 MMOL/L (ref 24–31)
CREAT BLD-MCNC: 1.06 MG/DL (ref 0.5–1.4)
DEPRECATED RDW RBC AUTO: 47.8 FL (ref 40–54)
ERYTHROCYTE [DISTWIDTH] IN BLOOD BY AUTOMATED COUNT: 14.7 % (ref 12–15)
GFR SERPL CREATININE-BSD FRML MDRD: 74 ML/MIN/1.73
GLUCOSE BLD-MCNC: 147 MG/DL (ref 70–100)
HCO3 BLDA-SCNC: 27.8 MMOL/L (ref 22–26)
HCT VFR BLD AUTO: 31.3 % (ref 40–52)
HGB BLD-MCNC: 10.4 G/DL (ref 14–18)
HOROWITZ INDEX BLD+IHG-RTO: 40 %
HYPOCHROMIA BLD QL: ABNORMAL
LYMPHOCYTES # BLD MANUAL: 3.83 10*3/MM3 (ref 0.72–4.86)
LYMPHOCYTES NFR BLD MANUAL: 23 % (ref 15–45)
LYMPHOCYTES NFR BLD MANUAL: 3 % (ref 4–12)
MCH RBC QN AUTO: 29.8 PG (ref 28–32)
MCHC RBC AUTO-ENTMCNC: 33.2 G/DL (ref 33–36)
MCV RBC AUTO: 89.7 FL (ref 82–95)
METAMYELOCYTES NFR BLD MANUAL: 2 % (ref 0–0)
MODALITY: ABNORMAL
MONOCYTES # BLD AUTO: 0.5 10*3/MM3 (ref 0.19–1.3)
MYELOCYTES NFR BLD MANUAL: 3 % (ref 0–0)
NEUTROPHILS # BLD AUTO: 11.32 10*3/MM3 (ref 1.87–8.4)
NEUTROPHILS NFR BLD MANUAL: 61 % (ref 39–78)
NEUTS BAND NFR BLD MANUAL: 7 % (ref 0–10)
PCO2 BLDA: 36.6 MM HG (ref 35–45)
PEEP RESPIRATORY: 10 CM[H2O]
PH BLDA: 7.5 PH UNITS (ref 7.35–7.45)
PLAT MORPH BLD: NORMAL
PLATELET # BLD AUTO: 349 10*3/MM3 (ref 130–400)
PMV BLD AUTO: 9.6 FL (ref 6–12)
PO2 BLDA: 76.8 MM HG (ref 80–100)
POTASSIUM BLD-SCNC: 3.3 MMOL/L (ref 3.5–5.3)
RBC # BLD AUTO: 3.49 10*6/MM3 (ref 4.8–5.9)
SAO2 % BLDCOA: 96.4 % (ref 94–100)
SAO2 % BLDCOA: 96.4 % (ref 94–100)
SCAN SLIDE: NORMAL
SODIUM BLD-SCNC: 147 MMOL/L (ref 135–145)
TOTAL RATE: 10 BREATHS/MINUTE
VARIANT LYMPHS NFR BLD MANUAL: 1 % (ref 0–5)
VENT CPAP/PEEP: 10
VENTILATOR MODE: AC
WBC MORPH BLD: NORMAL
WBC NRBC COR # BLD: 16.64 10*3/MM3 (ref 4.8–10.8)

## 2017-07-12 PROCEDURE — 25010000002 FENTANYL CITRATE (PF) 100 MCG/2ML SOLUTION: Performed by: NURSE ANESTHETIST, CERTIFIED REGISTERED

## 2017-07-12 PROCEDURE — 25010000002 DEXAMETHASONE PER 1 MG: Performed by: FAMILY MEDICINE

## 2017-07-12 PROCEDURE — 94770: CPT

## 2017-07-12 PROCEDURE — 80048 BASIC METABOLIC PNL TOTAL CA: CPT | Performed by: FAMILY MEDICINE

## 2017-07-12 PROCEDURE — 31600 PLANNED TRACHEOSTOMY: CPT | Performed by: OTOLARYNGOLOGY

## 2017-07-12 PROCEDURE — 25010000002 PIPERACILLIN SOD-TAZOBACTAM PER 1 G: Performed by: FAMILY MEDICINE

## 2017-07-12 PROCEDURE — 97110 THERAPEUTIC EXERCISES: CPT

## 2017-07-12 PROCEDURE — 25010000002 HYDROMORPHONE PER 4 MG: Performed by: INTERNAL MEDICINE

## 2017-07-12 PROCEDURE — 25010000003 POTASSIUM CHLORIDE PER 2 MEQ: Performed by: FAMILY MEDICINE

## 2017-07-12 PROCEDURE — 99024 POSTOP FOLLOW-UP VISIT: CPT | Performed by: OTOLARYNGOLOGY

## 2017-07-12 PROCEDURE — 74000 HC ABDOMEN KUB: CPT

## 2017-07-12 PROCEDURE — 25010000003 LEVETIRACETAM IN NACL 0.75% 1000 MG/100ML SOLUTION: Performed by: PSYCHIATRY & NEUROLOGY

## 2017-07-12 PROCEDURE — 25010000002 THIAMINE PER 100 MG: Performed by: INTERNAL MEDICINE

## 2017-07-12 PROCEDURE — 25010000002 VANCOMYCIN 10 G RECONSTITUTED SOLUTION: Performed by: FAMILY MEDICINE

## 2017-07-12 PROCEDURE — 76775 US EXAM ABDO BACK WALL LIM: CPT

## 2017-07-12 PROCEDURE — 99232 SBSQ HOSP IP/OBS MODERATE 35: CPT | Performed by: PSYCHIATRY & NEUROLOGY

## 2017-07-12 PROCEDURE — 85007 BL SMEAR W/DIFF WBC COUNT: CPT | Performed by: FAMILY MEDICINE

## 2017-07-12 PROCEDURE — 71010 HC CHEST PA OR AP: CPT

## 2017-07-12 PROCEDURE — 94799 UNLISTED PULMONARY SVC/PX: CPT

## 2017-07-12 PROCEDURE — 94760 N-INVAS EAR/PLS OXIMETRY 1: CPT

## 2017-07-12 PROCEDURE — 85025 COMPLETE CBC W/AUTO DIFF WBC: CPT | Performed by: FAMILY MEDICINE

## 2017-07-12 PROCEDURE — 94003 VENT MGMT INPAT SUBQ DAY: CPT

## 2017-07-12 PROCEDURE — 25010000002 PROPOFOL 1000 MG/ML EMULSION: Performed by: INTERNAL MEDICINE

## 2017-07-12 PROCEDURE — 0B110F4 BYPASS TRACHEA TO CUTANEOUS WITH TRACHEOSTOMY DEVICE, OPEN APPROACH: ICD-10-PCS | Performed by: INTERNAL MEDICINE

## 2017-07-12 PROCEDURE — 5A1955Z RESPIRATORY VENTILATION, GREATER THAN 96 CONSECUTIVE HOURS: ICD-10-PCS | Performed by: OTOLARYNGOLOGY

## 2017-07-12 PROCEDURE — 25010000003 DEXAMETHASONE SODIUM PHOSPHATE 100 MG/10ML SOLUTION 1 ML VIAL: Performed by: FAMILY MEDICINE

## 2017-07-12 PROCEDURE — 25010000002 LEVOFLOXACIN PER 250 MG: Performed by: FAMILY MEDICINE

## 2017-07-12 PROCEDURE — 82803 BLOOD GASES ANY COMBINATION: CPT

## 2017-07-12 PROCEDURE — 25010000002 PROPOFOL 10 MG/ML EMULSION: Performed by: NURSE ANESTHETIST, CERTIFIED REGISTERED

## 2017-07-12 PROCEDURE — 36600 WITHDRAWAL OF ARTERIAL BLOOD: CPT

## 2017-07-12 PROCEDURE — 93975 VASCULAR STUDY: CPT

## 2017-07-12 RX ORDER — TERAZOSIN 10 MG/1
10 CAPSULE ORAL NIGHTLY
Status: DISCONTINUED | OUTPATIENT
Start: 2017-07-12 | End: 2017-07-14

## 2017-07-12 RX ORDER — SCOLOPAMINE TRANSDERMAL SYSTEM 1 MG/1
1 PATCH, EXTENDED RELEASE TRANSDERMAL
Status: DISCONTINUED | OUTPATIENT
Start: 2017-07-12 | End: 2017-07-13

## 2017-07-12 RX ORDER — PROPOFOL 10 MG/ML
VIAL (ML) INTRAVENOUS AS NEEDED
Status: DISCONTINUED | OUTPATIENT
Start: 2017-07-12 | End: 2017-07-12 | Stop reason: SURG

## 2017-07-12 RX ORDER — DEXAMETHASONE SODIUM PHOSPHATE 4 MG/ML
4 INJECTION, SOLUTION INTRA-ARTICULAR; INTRALESIONAL; INTRAMUSCULAR; INTRAVENOUS; SOFT TISSUE EVERY 6 HOURS
Status: DISCONTINUED | OUTPATIENT
Start: 2017-07-12 | End: 2017-07-13

## 2017-07-12 RX ORDER — FENTANYL CITRATE 50 UG/ML
INJECTION, SOLUTION INTRAMUSCULAR; INTRAVENOUS AS NEEDED
Status: DISCONTINUED | OUTPATIENT
Start: 2017-07-12 | End: 2017-07-12 | Stop reason: SURG

## 2017-07-12 RX ORDER — POTASSIUM CHLORIDE 29.8 MG/ML
20 INJECTION INTRAVENOUS ONCE
Status: COMPLETED | OUTPATIENT
Start: 2017-07-12 | End: 2017-07-12

## 2017-07-12 RX ADMIN — CHLORHEXIDINE GLUCONATE 15 ML: 1.2 RINSE ORAL at 20:09

## 2017-07-12 RX ADMIN — TAZOBACTAM SODIUM AND PIPERACILLIN SODIUM 3.38 G: 375; 3 INJECTION, SOLUTION INTRAVENOUS at 00:25

## 2017-07-12 RX ADMIN — ALBUTEROL SULFATE 2.5 MG: 2.5 SOLUTION RESPIRATORY (INHALATION) at 01:10

## 2017-07-12 RX ADMIN — PROPOFOL 75 MCG/KG/MIN: 10 INJECTION, EMULSION INTRAVENOUS at 14:40

## 2017-07-12 RX ADMIN — FAMOTIDINE 20 MG: 20 TABLET, FILM COATED ORAL at 20:09

## 2017-07-12 RX ADMIN — LEVETIRACETAM 1000 MG: 1000 INJECTION, SOLUTION INTRAVENOUS at 08:43

## 2017-07-12 RX ADMIN — SCOPOLAMINE 1 PATCH: 1 PATCH, EXTENDED RELEASE TRANSDERMAL at 02:53

## 2017-07-12 RX ADMIN — PROPOFOL 100 MG: 10 INJECTION, EMULSION INTRAVENOUS at 12:47

## 2017-07-12 RX ADMIN — THIAMINE HYDROCHLORIDE 100 MG: 100 INJECTION, SOLUTION INTRAMUSCULAR; INTRAVENOUS at 08:51

## 2017-07-12 RX ADMIN — HYDROMORPHONE HYDROCHLORIDE 1 MG: 1 INJECTION, SOLUTION INTRAMUSCULAR; INTRAVENOUS; SUBCUTANEOUS at 14:55

## 2017-07-12 RX ADMIN — PROPOFOL 75 MCG/KG/MIN: 10 INJECTION, EMULSION INTRAVENOUS at 04:05

## 2017-07-12 RX ADMIN — LEVETIRACETAM 1000 MG: 1000 INJECTION, SOLUTION INTRAVENOUS at 20:09

## 2017-07-12 RX ADMIN — VANCOMYCIN HYDROCHLORIDE 1250 MG: 10 INJECTION, POWDER, LYOPHILIZED, FOR SOLUTION INTRAVENOUS at 22:13

## 2017-07-12 RX ADMIN — DEXTROMETHORPHAN 30 MG: 30 SUSPENSION, EXTENDED RELEASE ORAL at 09:22

## 2017-07-12 RX ADMIN — TERAZOSIN HYDROCHLORIDE ANHYDROUS 10 MG: 10 CAPSULE ORAL at 20:09

## 2017-07-12 RX ADMIN — DEXAMETHASONE SODIUM PHOSPHATE 6 MG: 10 INJECTION, SOLUTION INTRAMUSCULAR; INTRAVENOUS at 06:05

## 2017-07-12 RX ADMIN — AMLODIPINE BESYLATE 5 MG: 5 TABLET ORAL at 09:22

## 2017-07-12 RX ADMIN — PROPOFOL 75 MCG/KG/MIN: 10 INJECTION, EMULSION INTRAVENOUS at 17:11

## 2017-07-12 RX ADMIN — TAZOBACTAM SODIUM AND PIPERACILLIN SODIUM 3.38 MG OF PIPERACILLIN: 375; 3 INJECTION, SOLUTION INTRAVENOUS at 12:52

## 2017-07-12 RX ADMIN — METRONIDAZOLE 500 MG: 500 TABLET, FILM COATED ORAL at 14:40

## 2017-07-12 RX ADMIN — ALBUTEROL SULFATE 2.5 MG: 2.5 SOLUTION RESPIRATORY (INHALATION) at 19:43

## 2017-07-12 RX ADMIN — FAMOTIDINE 20 MG: 20 TABLET, FILM COATED ORAL at 08:44

## 2017-07-12 RX ADMIN — LEVOFLOXACIN 750 MG: 5 INJECTION, SOLUTION INTRAVENOUS at 11:29

## 2017-07-12 RX ADMIN — DEXAMETHASONE SODIUM PHOSPHATE 4 MG: 4 INJECTION, SOLUTION INTRAMUSCULAR; INTRAVENOUS at 18:11

## 2017-07-12 RX ADMIN — PROPOFOL 75 MCG/KG/MIN: 10 INJECTION, EMULSION INTRAVENOUS at 01:28

## 2017-07-12 RX ADMIN — DEXAMETHASONE SODIUM PHOSPHATE 6 MG: 10 INJECTION, SOLUTION INTRAMUSCULAR; INTRAVENOUS at 01:22

## 2017-07-12 RX ADMIN — FENTANYL CITRATE 100 MCG: 50 INJECTION, SOLUTION INTRAMUSCULAR; INTRAVENOUS at 12:49

## 2017-07-12 RX ADMIN — POTASSIUM CHLORIDE 20 MEQ: 400 INJECTION, SOLUTION INTRAVENOUS at 11:30

## 2017-07-12 RX ADMIN — METRONIDAZOLE 500 MG: 500 TABLET, FILM COATED ORAL at 22:13

## 2017-07-12 RX ADMIN — TAZOBACTAM SODIUM AND PIPERACILLIN SODIUM 3.38 G: 375; 3 INJECTION, SOLUTION INTRAVENOUS at 06:03

## 2017-07-12 RX ADMIN — VANCOMYCIN HYDROCHLORIDE 1250 MG: 10 INJECTION, POWDER, LYOPHILIZED, FOR SOLUTION INTRAVENOUS at 09:23

## 2017-07-12 RX ADMIN — PROPOFOL 75 MCG/KG/MIN: 10 INJECTION, EMULSION INTRAVENOUS at 19:49

## 2017-07-12 RX ADMIN — PROPOFOL 75 MCG/KG/MIN: 10 INJECTION, EMULSION INTRAVENOUS at 22:30

## 2017-07-12 RX ADMIN — PROPOFOL 75 MCG/KG/MIN: 10 INJECTION, EMULSION INTRAVENOUS at 11:38

## 2017-07-12 RX ADMIN — DEXTROMETHORPHAN 30 MG: 30 SUSPENSION, EXTENDED RELEASE ORAL at 18:11

## 2017-07-12 RX ADMIN — ALBUTEROL SULFATE 2.5 MG: 2.5 SOLUTION RESPIRATORY (INHALATION) at 06:32

## 2017-07-12 RX ADMIN — PROPOFOL 75 MCG/KG/MIN: 10 INJECTION, EMULSION INTRAVENOUS at 06:09

## 2017-07-12 RX ADMIN — CHLORHEXIDINE GLUCONATE 15 ML: 1.2 RINSE ORAL at 11:29

## 2017-07-12 RX ADMIN — TAZOBACTAM SODIUM AND PIPERACILLIN SODIUM 3.38 G: 375; 3 INJECTION, SOLUTION INTRAVENOUS at 18:11

## 2017-07-12 RX ADMIN — PROPOFOL 75 MCG/KG/MIN: 10 INJECTION, EMULSION INTRAVENOUS at 08:41

## 2017-07-12 RX ADMIN — ALBUTEROL SULFATE 2.5 MG: 2.5 SOLUTION RESPIRATORY (INHALATION) at 11:36

## 2017-07-12 RX ADMIN — METRONIDAZOLE 500 MG: 500 TABLET, FILM COATED ORAL at 07:00

## 2017-07-12 NOTE — PRE-PROCEDURE NOTE
Pt going to surgery via ambu bag at 100%. [place] at 24cm, etco2 35mmhg, sat 100% leaving icu 7. Trough out transport sat remained %, etco2 35-36mmhg. CNRA at this point took pt over.     Louisa Gresham RRT

## 2017-07-12 NOTE — PLAN OF CARE
Problem: Patient Care Overview (Adult)  Goal: Plan of Care Review  Outcome: Ongoing (interventions implemented as appropriate)    07/12/17 1140   Coping/Psychosocial Response Interventions   Plan Of Care Reviewed With other (see comments)  (nursing)   Patient Care Overview   Progress no change   Outcome Evaluation   Outcome Summary/Follow up Plan Pt tube feeds on hold for trach today. Recommend if TF conintues may benefit from PEG tube placement in the future. Cont to follow. Resume TF per protocol s/p trach surgery.          Problem: Nutrition, Enteral (Adult)  Goal: Signs and Symptoms of Listed Potential Problems Will be Absent or Manageable (Nutrition, Enteral)  Outcome: Ongoing (interventions implemented as appropriate)

## 2017-07-12 NOTE — PLAN OF CARE
Problem: Patient Care Overview (Adult)  Goal: Plan of Care Review  Outcome: Ongoing (interventions implemented as appropriate)    07/12/17 0836   Coping/Psychosocial Response Interventions   Plan Of Care Reviewed With patient   Patient Care Overview   Progress no change   Outcome Evaluation   Outcome Summary/Follow up Plan Pt. continues to be sedated on vent. Performed PROM and stretching to all extremities. While Dr. Arroyo was present, sedation was paused and pt. squeeze each of his hands. Will contnue to work with pt., preventing contractures, while he is here.

## 2017-07-12 NOTE — PLAN OF CARE
Problem: Patient Care Overview (Adult)  Goal: Plan of Care Review    07/12/17 0633   Coping/Psychosocial Response Interventions   Plan Of Care Reviewed With family   Patient Care Overview   Progress progress toward functional goals as expected   Outcome Evaluation   Outcome Summary/Follow up Plan NG inadvertantly removed this shift. Reinserted to 60. 1 small BM. Adequate UOP. Scopolamine patch ordered for copious secretions, behind right ear.

## 2017-07-12 NOTE — PAYOR COMM NOTE
"[id number]   [facility]     Cont in ICU     Nishant Wahl (52 y.o. Male)     Date of Birth Social Security Number Address Home Phone MRN    1965  [address] [facility]  [place] [address] [phone] [id number]    Muslim Marital Status          [facility] Single       Admission Date Admission Type Admitting Provider Attending Provider Department, Room/Bed    7/3/17 Emergency Cristino Encinas DO Robinson, Maurice S, DO [facility] INTENSIVE CARE, I007/1    Discharge Date Discharge Disposition Discharge Destination                      Attending Provider: Cristino Encinas DO     Allergies:  Lipitor [Atorvastatin]    Isolation:  Spore   Infection:  C.difficile (07/07/17)   Code Status:  FULL    Ht:  67\" (170.2 cm)   Wt:  204 lb 11.2 oz (92.9 kg)    Admission Cmt:  None   Principal Problem:  None                Active Insurance as of 7/3/2017     Primary Coverage     Payor Plan Insurance Group Employer/Plan Group    [facility] [place] [facility]      Payor Plan Address Payor Plan Phone Number Effective From Effective To    [address] [address]  7/3/2017     [place] [address]       Subscriber Name Subscriber Birth Date Member ID       NISHANT WAHL 1965 [id number]                 Emergency Contacts      (Rel.) Home Phone Work Phone Mobile Phone    Yvette Pollack [phone] -- [phone]        Patrick Amezquita RN Registered Nurse Signed  Plan of Care Date of Service: 7/12/2017  6:37 AM         Problem: Patient Care Overview (Adult)  Goal: Plan of Care Review     07/12/17 0633   Coping/Psychosocial Response Interventions   Plan Of Care Reviewed With family   Patient Care Overview   Progress progress toward functional goals as expected   Outcome Evaluation   Outcome Summary/Follow up Plan NG inadvertantly removed this shift. Reinserted to 60. 1 small BM. Adequate UOP. Scopolamine patch ordered for copious secretions, behind " right ear.                                         Physician Progress Notes (last 24 hours) (Notes from 7/11/2017  8:32 AM through 7/12/2017  8:32 AM)      Indra Gallo MD at 7/11/2017  8:38 AM  Version 2 of 2             PULMONARY AND CRITICAL CARE PROGRESS NOTE - [facility]    Patient: Nishant Savage    1965    MR# [id number]    Acct# [id number]  07/11/17   8:38 AM  Referring Provider: Cristino Encinas DO    Chief Complaint: Mechanically ventilated    Interval history: Remains on ventilator with propofol gtt infusing.  Cardene is currently off.  He will open his eyes and follow some commands despite propofol.  Abdomen remains distended, somewhat softer after diuresis.  Still with purulent oral secretions.  Tongue is still bruised and edematous protruding form mouth, but does look slightly less swollen today.  Currently FiO2 40% with O2 sat 96%.  No other aggravating or allevitating factors.       Meds:    albuterol 2.5 mg Nebulization Q6H - RT   amLODIPine 5 mg Per G Tube Q24H   ceftriaxone 2 g Intravenous Q12H   chlorhexidine 15 mL Mouth/Throat Q12H   CloNIDine 1 patch Transdermal Weekly   dexamethasone 6 mg Intravenous Q6H   dextromethorphan polistirex ER 30 mg Oral BID   enoxaparin 40 mg Subcutaneous Daily   famotidine 20 mg Per G Tube Q12H   levETIRAcetam 1,000 mg Intravenous Q12H   metoprolol tartrate 50 mg Nasogastric Q12H   metroNIDAZOLE 500 mg Per G Tube Q8H   thiamine (VITAMIN B1) IVPB 100 mg Intravenous Daily   vancomycin 1,250 mg Intravenous Q12H       niCARdipine 5-15 mg/hr Last Rate: 5 mg/hr (07/10/17 0448)   Pharmacy to dose vancomycin     propofol 5-50 mcg/kg/min Last Rate: 75 mcg/kg/min (07/11/17 0609)     Review of Systems:   Cannot obtain due to mechanical ventilation.  The patient notably is critically ill and connected to a ventilator.  As such patient cannot communicate and provide any history whatsoever, including any history of present illness or interval  history since arrival or review of systems. The interested reviewer may note this fact, as an attempt has been made at collecting and documenting these portions of the patient history, but this information is unobtainable despite attempted review and therefore cannot be documented at this time.     Ventilator Settings:  Vent Mode: VC/AC  Vt (Set, L): 0.65 L  Resp Rate (Set): 10  Pressure Support (cm H2O): 0 cm H20  FiO2 (%): 40 %  PEEP/CPAP (cm H2O): 10 cm H20  Minute Ventilation (L/min) (Obs): 15.4 L/min  Resp Rate (Observed) Vent: 23  I:E Ratio (Set): 1:1.30  I:E Ratio (Obs): 1:1.3  PIP Observed (cm H2O): 31 cm H2O  RSBI: 202    Physical Exam:  Temp:  [98.7 °F (37.1 °C)-99.2 °F (37.3 °C)] 99.2 °F (37.3 °C)  Heart Rate:  [] 85  Resp:  [12-22] 21  BP: (133-206)/() 191/116  FiO2 (%):  [40 %] 40 %    Intake/Output Summary (Last 24 hours) at 07/11/17 0838  Last data filed at 07/11/17 0400   Gross per 24 hour   Intake          2722.37 ml   Output             7100 ml   Net         -4377.63 ml     SpO2 Readings from Last 3 Encounters:   07/11/17 98%   09/30/16 98%   02/04/16 96%     Physical Exam   Gen: Sedated and intubated on mechanical ventilatory support.  HEENT: Endotracheal tube is in place. Tongue is swollen and bruised. Oral secretions.   Neck: Supple.  Respiratory: Fair air movement bilaterally, mild rhonchi today, rales have improved today.   Cardiac: Sinus rhythm. No murmur or gallop noted.   Abdomen: firmer than previous assessments, distended, bowel sounds present.   Neurologic: He has been opening eyes and following commands per nursing.   Psychiatric: Cannot assess as he is sedated and intubated.  Dermatologic: No rash noted.  Extremities: +generalized edema, SCDs are in place.  Musculoskeletal: No joint deformities noted.  Lymphatic: No adenopathy palpated    Results from last 7 days  Lab Units 07/08/17  1057 07/06/17  0222 07/05/17  0158   WBC 10*3/mm3 12.30* 9.27 15.95*   HEMOGLOBIN g/dL  10.4* 12.1* 12.8*   PLATELETS 10*3/mm3 199 216 246       Results from last 7 days  Lab Units 07/11/17  0142 07/10/17  0338 07/09/17  0348   SODIUM mmol/L 147* 144 143   POTASSIUM mmol/L 3.7 4.1 3.7   BUN mg/dL 21 13 11   CREATININE mg/dL 1.07 0.98 0.91       Results from last 7 days  Lab Units 07/11/17  0327 07/10/17  0255 07/09/17  0159   PH, ARTERIAL pH units 7.495* 7.443 7.417   PCO2, ARTERIAL mm Hg 31.1* 32.9* 36.3   PO2 ART mm Hg 76.1* 55.9* 80.8   FIO2 % 40 30 30        Recent films:  Imaging Results (last 24 hours)     Procedure Component Value Units Date/Time    XR Abdomen KUB [[id number]] Collected:  07/10/17 1314     Updated:  07/10/17 1319    Narrative:       HISTORY: Abdomen distended and firm.     FINDINGS: KUB radiograph demonstrates a nonspecific bowel gas pattern.  There is noted to be a distended loop of what I suspect represents the  sigmoid colon with a sigmoid volvulus in the differential. I do not see  evidence of obstruction or pneumatosis. There is no evidence of  pneumoperitoneum.       Impression:       . Nonspecific bowel gas pattern with gas in both small bowel  and colon. There is some asymmetric distention of what appears to be a  loop of the sigmoid colon with a sigmoid volvulus a differential  although considered unlikely given the lack of distention of the more  proximal bowel and the patient's age. If symptoms are persistent follow  up with CT imaging could be obtained for further characterization.  This report was finalized on 07/10/2017 13:16 by Dr. Dilshad Silver MD.    CT Abdomen Pelvis Without Contrast [[id number]] Collected:  07/10/17 2035     Updated:  07/10/17 2047    Narrative:       EXAMINATION: CT ABDOMEN PELVIS WO CONTRAST- 7/10/2017 8:35 PM CDT     HISTORY: Abdominal distention and pain, possible sigmoid volvulus.     DOSE: 1821 mGycm (Automatic exposure control technique was implemented  in an effort to keep the radiation dose as low as possible without  compromising  image quality)     REPORT: Spiral CT of the abdomen and pelvis was performed without  intravenous or oral contrast from the lung bases through the pubic  symphysis. Reconstructed coronal and sagittal images are also reviewed.  Comparison: KUB on the same date. There is no previous CT. Lung windows  demonstrate consolidation of the lower lobe laterally with air  bronchograms, probable bilateral pneumonia mixed with atelectasis. There  are tiny bilateral pleural effusions. A nasogastric tube is present in  good position. There is mild cardiomegaly. The liver and spleen appear  homogeneous. The gallbladder is slightly contracted. Pancreas and  adrenal glands are within normal limits. No nephrolithiasis is seen and  there is no hydronephrosis. There is perinephric fat stranding  bilaterally, which is nonspecific. There is no evidence of bowel  obstruction or sigmoid volvulus. The sigmoid colon is redundant. There  is moderate sigmoid diverticulosis. There is nonspecific increased  attenuation of fat planes in the pelvis, including presacral fat planes  and fat planes at the level of the aortic bifurcation and inguinal  regions. There is also mildly increased attenuation of fat planes over  the flanks, slightly greater on the right. There are fat containing  inguinal hernias bilaterally. No intra-abdominal abscess or free air is  identified. The inflammatory changes do not appear to be associated with  the sigmoid colon. The appendix is normal. There is a small  fat-containing periumbilical hernia that measures 2.6 cm. Review of bone  windows is unremarkable.       Impression:       1. No evidence of bowel obstruction or sigmoid volvulus. There is  moderate sigmoid diverticulosis without acute diverticulitis.  2. Nonspecific mild fat stranding/inflammation within the  retroperitoneum and extraperitoneal fat planes in the pelvis, without  evidence of an abscess.  3. Extensive infiltrates in the lower lobes with air  bronchograms  suspicious for acute lateral lower lobe pneumonia probably combined with  atelectasis.        This report was finalized on 07/10/2017 20:44 by Dr. Marvin Mar MD.    XR Chest 1 View [[id number]] Collected:  07/11/17 0722     Updated:  07/11/17 0726    Narrative:       EXAMINATION:   XR CHEST 1 VW-  7/11/2017 7:22 AM CDT     HISTORY: Respiratory failure.     Frontal upright radiograph of the chest 7/11/2017 3:15 AM CDT     COMPARISON: 07/10/2017.     FINDINGS:   The lungs are clear. Cardiac silhouettes mildly enlarged. Left  subclavian catheter and endotracheal tube are present and satisfactorily  positioned.      The osseous structures and surrounding soft tissues demonstrate no acute  abnormality.       Impression:       1. No radiographic evidence of acute cardiopulmonary process.        This report was finalized on 07/11/2017 07:23 by Dr. Adolph Echols MD.        Films reviewed personally by me.  My interpretation: CXR remains stable     Pulmonary Assessment:  1. Respiratory failure related to agitation from ethanol withdrawal.  2. Malignant HTN   3. PRES   4. Recent problems with angioedema which are improving   5. History of chronic ethanol use  6. C-diff   7. Tongue hematoma  8. MSSA respiratory culture       Recommend:   · Continue current mechanical ventilation.  Not candidate for weaning trials 2' to neuro status, hypertension, and tongue swelling.  Most likely going to require a tracheostomy.       · Volume status much improved today after albumin/lasix per attending. net I/O -5L vs +9 yesterday     Electronically signed by EPI Manzo on 7/11/2017 at 8:38 AM    Physician substantive contribution:  Pertinent symptoms/interval history include: He remains intubated.  Again he is somewhat responsive even with sedation ongoing.  I suspect his bibasilar infiltrates on abdominal CT represent predominantly atelectasis.  Respiratory exam shows pertinent findings of fair air movement  on chest exam.  Plan includes: Even if his sedation can be weaned further he would be a poor candidate for a weaning to extubation trial because his upper airway issues at present.  I have seen and examined patient personally, performing a face-to-face diagnostic evaluation with plan of care reviewed and developed with APRN and nursing staff. I have addended and/or modified the above history of present illness, physical examination, and assessment and plan to reflect my findings and impressions. Essential elements of the care plan were discussed with APRN above.  Agree with findings and assessment/plan as documented above.    Electronically signed by Indra Gallo MD, on 7/11/2017, 3:18 PM           Electronically signed by Indra Gallo MD at 7/11/2017  3:31 PM      EPI Manzo at 7/11/2017  8:38 AM  Version 1 of 2             PULMONARY AND CRITICAL CARE PROGRESS NOTE - [facility]    Patient: Nishant Savage    1965    MR# [id number]    Acct# [id number]  07/11/17   8:38 AM  Referring Provider: Cristino Encinas DO    Chief Complaint: Mechanically ventilated    Interval history: Remains on ventilator with propofol gtt infusing.  Cardene is currently off.  He will open his eyes and follow some commands despite propofol.  Abdomen remains distended, somewhat softer after diuresis.  Still with purulent oral secretions.  Tongue is still bruised and edematous protruding form mouth, but does look slightly less swollen today.  Currently FiO2 40% with O2 sat 96%.  No other aggravating or allevitating factors.       Meds:    albuterol 2.5 mg Nebulization Q6H - RT   amLODIPine 5 mg Per G Tube Q24H   ceftriaxone 2 g Intravenous Q12H   chlorhexidine 15 mL Mouth/Throat Q12H   CloNIDine 1 patch Transdermal Weekly   dexamethasone 6 mg Intravenous Q6H   dextromethorphan polistirex ER 30 mg Oral BID   enoxaparin 40 mg Subcutaneous Daily   famotidine 20 mg Per G Tube Q12H      levETIRAcetam 1,000 mg Intravenous Q12H   metoprolol tartrate 50 mg Nasogastric Q12H   metroNIDAZOLE 500 mg Per G Tube Q8H   thiamine (VITAMIN B1) IVPB 100 mg Intravenous Daily   vancomycin 1,250 mg Intravenous Q12H       niCARdipine 5-15 mg/hr Last Rate: 5 mg/hr (07/10/17 0448)   Pharmacy to dose vancomycin     propofol 5-50 mcg/kg/min Last Rate: 75 mcg/kg/min (07/11/17 0609)     Review of Systems:   Cannot obtain due to mechanical ventilation.  The patient notably is critically ill and connected to a ventilator.  As such patient cannot communicate and provide any history whatsoever, including any history of present illness or interval history since arrival or review of systems. The interested reviewer may note this fact, as an attempt has been made at collecting and documenting these portions of the patient history, but this information is unobtainable despite attempted review and therefore cannot be documented at this time.     Ventilator Settings:  Vent Mode: VC/AC  Vt (Set, L): 0.65 L  Resp Rate (Set): 10  Pressure Support (cm H2O): 0 cm H20  FiO2 (%): 40 %  PEEP/CPAP (cm H2O): 10 cm H20  Minute Ventilation (L/min) (Obs): 15.4 L/min  Resp Rate (Observed) Vent: 23  I:E Ratio (Set): 1:1.30  I:E Ratio (Obs): 1:1.3  PIP Observed (cm H2O): 31 cm H2O  RSBI: 202    Physical Exam:  Temp:  [98.7 °F (37.1 °C)-99.2 °F (37.3 °C)] 99.2 °F (37.3 °C)  Heart Rate:  [] 85  Resp:  [12-22] 21  BP: (133-206)/() 191/116  FiO2 (%):  [40 %] 40 %    Intake/Output Summary (Last 24 hours) at 07/11/17 0838  Last data filed at 07/11/17 0400   Gross per 24 hour   Intake          2722.37 ml   Output             7100 ml   Net         -4377.63 ml     SpO2 Readings from Last 3 Encounters:   07/11/17 98%   09/30/16 98%   02/04/16 96%     Physical Exam   Gen: Sedated and intubated on mechanical ventilatory support.  HEENT: Endotracheal tube is in place. Tongue is swollen and bruised. Oral secretions.   Neck: Supple.  Respiratory:  Fair air movement bilaterally, mild rhonchi today, rales have improved today.   Cardiac: Sinus rhythm. No murmur or gallop noted.   Abdomen: firmer than previous assessments, distended, bowel sounds present.   Neurologic: He has been opening eyes and following commands per nursing.   Psychiatric: Cannot assess as he is sedated and intubated.  Dermatologic: No rash noted.  Extremities: +generalized edema, SCDs are in place.  Musculoskeletal: No joint deformities noted.  Lymphatic: No adenopathy palpated    Results from last 7 days  Lab Units 07/08/17  1057 07/06/17  0222 07/05/17  0158   WBC 10*3/mm3 12.30* 9.27 15.95*   HEMOGLOBIN g/dL 10.4* 12.1* 12.8*   PLATELETS 10*3/mm3 199 216 246       Results from last 7 days  Lab Units 07/11/17  0142 07/10/17  0338 07/09/17  0348   SODIUM mmol/L 147* 144 143   POTASSIUM mmol/L 3.7 4.1 3.7   BUN mg/dL 21 13 11   CREATININE mg/dL 1.07 0.98 0.91       Results from last 7 days  Lab Units 07/11/17  0327 07/10/17  0255 07/09/17  0159   PH, ARTERIAL pH units 7.495* 7.443 7.417   PCO2, ARTERIAL mm Hg 31.1* 32.9* 36.3   PO2 ART mm Hg 76.1* 55.9* 80.8   FIO2 % 40 30 30        Recent films:  Imaging Results (last 24 hours)     Procedure Component Value Units Date/Time    XR Abdomen KUB [[id number]] Collected:  07/10/17 1314     Updated:  07/10/17 1319    Narrative:       HISTORY: Abdomen distended and firm.     FINDINGS: KUB radiograph demonstrates a nonspecific bowel gas pattern.  There is noted to be a distended loop of what I suspect represents the  sigmoid colon with a sigmoid volvulus in the differential. I do not see  evidence of obstruction or pneumatosis. There is no evidence of  pneumoperitoneum.       Impression:       . Nonspecific bowel gas pattern with gas in both small bowel  and colon. There is some asymmetric distention of what appears to be a  loop of the sigmoid colon with a sigmoid volvulus a differential  although considered unlikely given the lack of distention of  the more  proximal bowel and the patient's age. If symptoms are persistent follow  up with CT imaging could be obtained for further characterization.  This report was finalized on 07/10/2017 13:16 by Dr. Dilshad Silver MD.    CT Abdomen Pelvis Without Contrast [[id number]] Collected:  07/10/17 2035     Updated:  07/10/17 2047    Narrative:       EXAMINATION: CT ABDOMEN PELVIS WO CONTRAST- 7/10/2017 8:35 PM CDT     HISTORY: Abdominal distention and pain, possible sigmoid volvulus.     DOSE: 1821 mGycm (Automatic exposure control technique was implemented  in an effort to keep the radiation dose as low as possible without  compromising image quality)     REPORT: Spiral CT of the abdomen and pelvis was performed without  intravenous or oral contrast from the lung bases through the pubic  symphysis. Reconstructed coronal and sagittal images are also reviewed.  Comparison: KUB on the same date. There is no previous CT. Lung windows  demonstrate consolidation of the lower lobe laterally with air  bronchograms, probable bilateral pneumonia mixed with atelectasis. There  are tiny bilateral pleural effusions. A nasogastric tube is present in  good position. There is mild cardiomegaly. The liver and spleen appear  homogeneous. The gallbladder is slightly contracted. Pancreas and  adrenal glands are within normal limits. No nephrolithiasis is seen and  there is no hydronephrosis. There is perinephric fat stranding  bilaterally, which is nonspecific. There is no evidence of bowel  obstruction or sigmoid volvulus. The sigmoid colon is redundant. There  is moderate sigmoid diverticulosis. There is nonspecific increased  attenuation of fat planes in the pelvis, including presacral fat planes  and fat planes at the level of the aortic bifurcation and inguinal  regions. There is also mildly increased attenuation of fat planes over  the flanks, slightly greater on the right. There are fat containing  inguinal hernias bilaterally.  No intra-abdominal abscess or free air is  identified. The inflammatory changes do not appear to be associated with  the sigmoid colon. The appendix is normal. There is a small  fat-containing periumbilical hernia that measures 2.6 cm. Review of bone  windows is unremarkable.       Impression:       1. No evidence of bowel obstruction or sigmoid volvulus. There is  moderate sigmoid diverticulosis without acute diverticulitis.  2. Nonspecific mild fat stranding/inflammation within the  retroperitoneum and extraperitoneal fat planes in the pelvis, without  evidence of an abscess.  3. Extensive infiltrates in the lower lobes with air bronchograms  suspicious for acute lateral lower lobe pneumonia probably combined with  atelectasis.        This report was finalized on 07/10/2017 20:44 by Dr. Marvin Mar MD.    XR Chest 1 View [[id number]] Collected:  07/11/17 0722     Updated:  07/11/17 0726    Narrative:       EXAMINATION:   XR CHEST 1 VW-  7/11/2017 7:22 AM CDT     HISTORY: Respiratory failure.     Frontal upright radiograph of the chest 7/11/2017 3:15 AM CDT     COMPARISON: 07/10/2017.     FINDINGS:   The lungs are clear. Cardiac silhouettes mildly enlarged. Left  subclavian catheter and endotracheal tube are present and satisfactorily  positioned.      The osseous structures and surrounding soft tissues demonstrate no acute  abnormality.       Impression:       1. No radiographic evidence of acute cardiopulmonary process.        This report was finalized on 07/11/2017 07:23 by Dr. Adolph Echols MD.        Films reviewed personally by me.  My interpretation: CXR remains stable     Pulmonary Assessment:  9. Respiratory failure related to agitation from ethanol withdrawal.  10. Malignant HTN   11. PRES   12. Recent problems with angioedema which are improving   13. History of chronic ethanol use  14. C-diff   15. Tongue hematoma  16. MSSA respiratory culture       Recommend:   · Continue current mechanical  ventilation.  Not candidate for weaning trials 2' to neuro status, hypertension, and tongue swelling.  Most likely going to require a tracheostomy.       · Volume status much improved today after albumin/lasix per attending. net I/O +5L vs +9 yesterday     Electronically signed by EPI Manzo on 7/11/2017 at 8:38 AM         Electronically signed by EPI Manzo at 7/11/2017  8:46 AM      Cristino Encinas DO at 7/11/2017  9:33 AM  Version 1 of 1             [facility] Medicine Services  INPATIENT PROGRESS NOTE    Length of Stay: 7  Date of Admission: 7/3/2017  Primary Care Physician: Linda Hoffman, DNP, APRN    Subjective     Chief Complaint:     The patient is intubated and sedated    HPI     The patient was successfully diuresed yesterday over 5 L.  CT scan of the abdomen shows no evidence of volvulus or acute intra-abdominal process but extensive infiltrates in the lower lobes with air bronchograms were noted (CXR same day shows only atelectasis).  Blood pressure remains labile.  Cardene drip has been discontinued.  The patient has low-grade temperature at 99.2° .  Dr. Arroyo of neurology and I had long discussion with the family today.  He had multiple questions and the questions were answered.  I think it is evident that the patient requires a tracheostomy.  He continues to become agitated when sedation is decreased.        Review of Systems     All pertinent negatives and positives are as above. All other systems have been reviewed and are negative unless otherwise stated.     Objective    Temp:  [98.7 °F (37.1 °C)-99.2 °F (37.3 °C)] 99.2 °F (37.3 °C)  Heart Rate:  [] 85  Resp:  [12-22] 21  BP: (133-206)/() 191/116  FiO2 (%):  [40 %] 40 %    Lab Results (last 24 hours)     Procedure Component Value Units Date/Time    Basic Metabolic Panel [[id number]]  (Abnormal) Collected:  07/11/17 0142    Specimen:  Blood Updated:  07/11/17 0222        Glucose 143 (H) mg/dL      BUN 21 mg/dL      Creatinine 1.07 mg/dL      Sodium 147 (H) mmol/L      Potassium 3.7 mmol/L      Chloride 109 mmol/L      CO2 25.0 mmol/L      Calcium 8.9 mg/dL      eGFR Non African Amer 73 mL/min/1.73      BUN/Creatinine Ratio 19.6     Anion Gap 13.0 mmol/L     Blood Gas, Arterial [[id number]]  (Abnormal) Collected:  07/11/17 0327    Specimen:  Arterial Blood Updated:  07/11/17 0330     Site Arterial: right radial     Gera's Test --      Documented in Rapid Comm        pH, Arterial 7.495 (H) pH units      pCO2, Arterial 31.1 (L) mm Hg      pO2, Arterial 76.1 (L) mm Hg      HCO3, Arterial 23.4 mmol/L      Base Excess, Arterial 1.1 mmol/L      O2 Saturation, Arterial 96.3 %      O2 Saturation Calculated 96.3 %      Barometric Pressure for Blood Gas -- mmHg       Component not reported at this site.        Modality Ventilator     FIO2 40 %      Ventilator Mode AC     Rate 10.0 Breaths/minute      PEEP 10.0     Vent CPAP/PEEP 10.0    Narrative:       Serial Number: [id number]    : [id number]          Imaging Results (last 24 hours)     Procedure Component Value Units Date/Time    XR Abdomen KUB [[id number]] Collected:  07/10/17 1314     Updated:  07/10/17 1319    Narrative:       HISTORY: Abdomen distended and firm.     FINDINGS: KUB radiograph demonstrates a nonspecific bowel gas pattern.  There is noted to be a distended loop of what I suspect represents the  sigmoid colon with a sigmoid volvulus in the differential. I do not see  evidence of obstruction or pneumatosis. There is no evidence of  pneumoperitoneum.       Impression:       . Nonspecific bowel gas pattern with gas in both small bowel  and colon. There is some asymmetric distention of what appears to be a  loop of the sigmoid colon with a sigmoid volvulus a differential  although considered unlikely given the lack of distention of the more  proximal bowel and the patient's age. If symptoms are persistent follow  up with CT  imaging could be obtained for further characterization.  This report was finalized on 07/10/2017 13:16 by Dr. Dilshad Silver MD.    CT Abdomen Pelvis Without Contrast [[id number]] Collected:  07/10/17 2035     Updated:  07/10/17 2047    Narrative:       EXAMINATION: CT ABDOMEN PELVIS WO CONTRAST- 7/10/2017 8:35 PM CDT     HISTORY: Abdominal distention and pain, possible sigmoid volvulus.     DOSE: 1821 mGycm (Automatic exposure control technique was implemented  in an effort to keep the radiation dose as low as possible without  compromising image quality)     REPORT: Spiral CT of the abdomen and pelvis was performed without  intravenous or oral contrast from the lung bases through the pubic  symphysis. Reconstructed coronal and sagittal images are also reviewed.  Comparison: KUB on the same date. There is no previous CT. Lung windows  demonstrate consolidation of the lower lobe laterally with air  bronchograms, probable bilateral pneumonia mixed with atelectasis. There  are tiny bilateral pleural effusions. A nasogastric tube is present in  good position. There is mild cardiomegaly. The liver and spleen appear  homogeneous. The gallbladder is slightly contracted. Pancreas and  adrenal glands are within normal limits. No nephrolithiasis is seen and  there is no hydronephrosis. There is perinephric fat stranding  bilaterally, which is nonspecific. There is no evidence of bowel  obstruction or sigmoid volvulus. The sigmoid colon is redundant. There  is moderate sigmoid diverticulosis. There is nonspecific increased  attenuation of fat planes in the pelvis, including presacral fat planes  and fat planes at the level of the aortic bifurcation and inguinal  regions. There is also mildly increased attenuation of fat planes over  the flanks, slightly greater on the right. There are fat containing  inguinal hernias bilaterally. No intra-abdominal abscess or free air is  identified. The inflammatory changes do not appear  to be associated with  the sigmoid colon. The appendix is normal. There is a small  fat-containing periumbilical hernia that measures 2.6 cm. Review of bone  windows is unremarkable.       Impression:       1. No evidence of bowel obstruction or sigmoid volvulus. There is  moderate sigmoid diverticulosis without acute diverticulitis.  2. Nonspecific mild fat stranding/inflammation within the  retroperitoneum and extraperitoneal fat planes in the pelvis, without  evidence of an abscess.  3. Extensive infiltrates in the lower lobes with air bronchograms  suspicious for acute lateral lower lobe pneumonia probably combined with  atelectasis.        This report was finalized on 07/10/2017 20:44 by Dr. Marvin Mar MD.    XR Chest 1 View [[id number]] Collected:  07/11/17 0722     Updated:  07/11/17 0726    Narrative:       EXAMINATION:   XR CHEST 1 VW-  7/11/2017 7:22 AM CDT     HISTORY: Respiratory failure.     Frontal upright radiograph of the chest 7/11/2017 3:15 AM CDT     COMPARISON: 07/10/2017.     FINDINGS:   The lungs are clear. Cardiac silhouettes mildly enlarged. Left  subclavian catheter and endotracheal tube are present and satisfactorily  positioned.      The osseous structures and surrounding soft tissues demonstrate no acute  abnormality.       Impression:       1. No radiographic evidence of acute cardiopulmonary process.        This report was finalized on 07/11/2017 07:23 by Dr. Adolph Echols MD.             Intake/Output Summary (Last 24 hours) at 07/11/17 0933  Last data filed at 07/11/17 0400   Gross per 24 hour   Intake          2722.37 ml   Output             7100 ml   Net         -4377.63 ml       Physical Exam    Constitutional: No distress.   Seen and discussed with his nurse, Ginny. No family present. He responds to Ativan plus Diprivan sedation well. ETT secure, Central line present.    HENT:   Head: Normocephalic and atraumatic.   Eyes: Pupils are equal, round, and reactive to light. The  patient's tongue edema is reduced by about 20%.  It remains bruised and protruding.   Neck: Neck supple. No JVD present.   Cardiovascular: Normal rate and regular rhythm.  Diffuse edema is noted in both the upper and lower extremities.  Pulmonary/Chest: Effort normal and breath sounds normal.   Ventilated.    Abdominal: Soft. Bowel sounds are normal.   Musculoskeletal: He exhibits edema (trace).   Neurological:   Currently he is sedated with propofol and Ativan. Discussed case with ENT. Not appropriate for extubation because of his lingual swelling and extreme agitation when sedation is weaned. Patient is more responsive and will follow commands such as moving feet and hands but still becomes agitated off sedation  Awakens to loud voice and pain. Opens eyes  --Squeezes hand and wiggles toes  --Will track some with eyes  --PERRL  --Positive Blink  --Moving all extremities, more so off sedation  --No w/d to pain currently  --DTRs absent throughout, equivocal Babinksi  Skin: Skin is warm and dry.      Results Review:  I have reviewed the labs, radiology results, and diagnostic studies since my last progress note and made treatment changes reflective of the results.   I have reviewed the current medications.    Assessment/Plan     Hospital Problem List     Angio-edema    Seizures    HCAP (healthcare-associated pneumonia)            1. Possible angioedema related to lisinopril.  2. Malignant hypertension.  3. Possible new onset seizure disorder versus convulsive syncope.  4. Posterior reversible encephalopathy syndrome on MRI.  5. Tongue bite with sublingual hematoma.  6. Tobacco abuse.  7. Obstructive sleep apnea, noncompliant with device.  8. Gouty arthritis.  9. Daily alcohol use with Delirium tremens requiring sedation and mechanical ventilation.  10. Hypokalemia.   11. C. Difficile colitilis  12. Lingual or sublingual infection, MSSA  13. Anasarca      PLAN:  De-escalate antibiotics for possible meningeal  infection  Continue antibiotics for pneumonia with positive MRSA cultures based on CT findings.  Continue vancomycin and Flagyl  Discontinue Rocephin  Add Zosyn and Levaquin to current regimen.  Increase Catapres to TTS-3  Increase metoprolol to 100 mg every 12 hours  Add Hytrin 5mg per G tube daily  Trach recommended at ENT's earliest opportunity  Lasix 40 mg IV every 12 hours ×2 doses  Restart tube feeding at previous rate.    Cristino Encinas DO   07/11/17   9:33 AM     Approximately 50 minutes of critical care time were spent managing the patient exclusive of billable procedures.          Electronically signed by Cristino Encinas DO at 7/11/2017 10:26 AM      Harpreet Arroyo MD at 7/11/2017  9:59 AM  Version 1 of 1           Neurology Progress Note      Chief Complaint:  Seizure, Malignant HTN, PRES, EtOH w/d, Respiratory failure on Vent    Subjective     Subjective:    No neurologic changes today.  He is still requiring sedation because of agitation.  Dr. Encinas is decreasing antibiotics today to cover respiratory infections.    Medications:  Current Facility-Administered Medications   Medication Dose Route Frequency Provider Last Rate Last Dose   • acetaminophen (TYLENOL) tablet 650 mg  650 mg Oral Q4H PRN Jairon Perez DO   650 mg at 07/09/17 1003   • albuterol (PROVENTIL) nebulizer solution 0.083% 2.5 mg/3mL  2.5 mg Nebulization Q6H - RT Jairon Perez DO   2.5 mg at 07/11/17 0629   • amLODIPine (NORVASC) tablet 5 mg  5 mg Per G Tube Q24H Cristino Encinas DO   5 mg at 07/11/17 0809   • chlorhexidine (PERIDEX) 0.12 % solution 15 mL  15 mL Mouth/Throat Q12H Cristino Encinas DO   15 mL at 07/11/17 0810   • CloNIDine (CATAPRES-TTS) 0.3 MG/24HR patch 1 patch  1 patch Transdermal Weekly Cristino Encinas DO       • dexamethasone sodium phosphate 6 mg in sodium chloride 0.9 % IVPB  6 mg Intravenous Q6H Cristino Encinas DO 0 mL/hr at 07/09/17 1245 6 mg at 07/11/17 0525   • dextromethorphan  polistirex ER (DELSYM) 30 MG/5ML oral suspension 30 mg  30 mg Oral BID Robe Dorsey, DO   30 mg at 07/11/17 0810   • enoxaparin (LOVENOX) syringe 40 mg  40 mg Subcutaneous Daily Cristino Encinas, DO   40 mg at 07/11/17 0809   • famotidine (PEPCID) tablet 20 mg  20 mg Per G Tube Q12H Cristino Encinas DO   20 mg at 07/11/17 0809   • HYDROcodone-acetaminophen (NORCO)  MG per tablet 1 tablet  1 tablet Oral Q4H PRN Jairon Perez DO   1 tablet at 07/03/17 1758   • HYDROcodone-acetaminophen (NORCO) 5-325 MG per tablet 1 tablet  1 tablet Oral Q4H PRN Jairon Perez DO       • HYDROmorphone (DILAUDID) injection 1 mg  1 mg Intravenous Q3H PRN Jairon Perez DO   1 mg at 07/10/17 1354    And   • naloxone (NARCAN) injection 0.4 mg  0.4 mg Intravenous Q5 Min PRN Jairon Perez DO       • ibuprofen (ADVIL,MOTRIN) 100 MG/5ML suspension 200 mg  200 mg Oral Q6H PRN Cristino Encinas DO   200 mg at 07/09/17 1342   • levETIRAcetam in NaCl 0.75% (KEPPRA) IVPB 1,000 mg  1,000 mg Intravenous Q12H Royal Campos MD 0 mL/hr at 07/09/17 0930 1,000 mg at 07/11/17 0810   • levoFLOXacin (LEVAQUIN) 750 mg/150 mL D5W (premix) (LEVAQUIN) 750 mg  750 mg Intravenous Q24H Cristino Encinas DO       • LORazepam (ATIVAN) tablet 1 mg  1 mg Oral Q2H PRN Cristino Encinas DO        Or   • LORazepam (ATIVAN) injection 1 mg  1 mg Intravenous Q2H PRN Cristino Encinas DO        Or   • LORazepam (ATIVAN) tablet 2 mg  2 mg Oral Q1H PRN Cristino Encinas DO        Or   • LORazepam (ATIVAN) injection 2 mg  2 mg Intravenous Q1H PRN Cristino Encinas DO   2 mg at 07/09/17 0336    Or   • LORazepam (ATIVAN) injection 2 mg  2 mg Intravenous Q15 Min PRN Cristino Encinas, DO        Or   • LORazepam (ATIVAN) injection 2 mg  2 mg Intramuscular Q15 Min PRN Cristino Encinas, DO        Or   • LORazepam (ATIVAN) tablet 4 mg  4 mg Oral Q1H PRN Cristino Encinas, DO        Or   • LORazepam (ATIVAN) injection 4 mg  4 mg Intravenous  Q1H PRN Cristino Encinas DO   4 mg at 07/10/17 1659   • metoprolol tartrate (LOPRESSOR) tablet 100 mg  100 mg Nasogastric Q12H Cristino Encinas DO       • metroNIDAZOLE (FLAGYL) tablet 500 mg  500 mg Per G Tube Q8H Cristino Encinas DO   500 mg at 07/11/17 0808   • niCARdipine (CARDENE) 25 mg/250 mL (0.1 mg/mL) 0.9% NS infusion  5-15 mg/hr Intravenous Titrated Jairon Perez DO 50 mL/hr at 07/10/17 0448 5 mg/hr at 07/10/17 0448   • ondansetron (ZOFRAN) injection 4 mg  4 mg Intravenous Q6H PRN Jairon Perez DO   4 mg at 07/09/17 1715   • Pharmacy to dose vancomycin   Does not apply Continuous PRN Cristino Encinas DO       • piperacillin-tazobactam (ZOSYN) 3.375 g in iso-osmotic dextrose 50 ml (premix)  3.375 g Intravenous Q6H Cristino Encinas DO       • propofol (DIPRIVAN) infusion 10 mg/mL 100 mL  5-50 mcg/kg/min Intravenous Titrated Jairon Perez DO 37.3 mL/hr at 07/11/17 0906 75 mcg/kg/min at 07/11/17 0906   • sodium chloride 0.9 % flush 1-10 mL  1-10 mL Intravenous PRN Jairon Perez DO       • terazosin (HYTRIN) capsule 5 mg  5 mg Per G Tube Nightly Cristino Encinas DO       • thiamine (B-1) 100 mg in sodium chloride 0.9 % 100 mL IVPB  100 mg Intravenous Daily Jairon Perez  mL/hr at 07/11/17 0809 100 mg at 07/11/17 0809   • vancomycin 1250 mg/250 mL 0.9% NS IVPB ([facility])  1,250 mg Intravenous Q12H Cristino Encinas, DO 0 mL/hr at 07/09/17 1000 1,250 mg at 07/11/17 0907       Review of Systems:   Could not obtain      Objective      Vital Signs  Temp:  [98.7 °F (37.1 °C)-99.2 °F (37.3 °C)] 99.2 °F (37.3 °C)  Heart Rate:  [] 85  Resp:  [12-22] 21  BP: (133-206)/() 191/116  FiO2 (%):  [40 %] 40 %    Physical Exam:  Heavily sedated  --We will squeeze hands to command right greater than left  NC/AT, tongue remains swollen  Sclera anicteric  Coarse BS bilaterally  RRR  Abdomen distended  4+ edema in feet and hands    Neuro:  Awakens to loud voice and pain.  Opens  eyes  --Squeezes hand and wiggles toes  --Will track some with eyes  --PERRL  --Positive Blink  --Moving all extremities, more so off sedation  --No w/d to pain currently  --DTRs absent throughout, equivocal Babinksi     Results Review:    I reviewed the patient's new clinical results.      Results from last 7 days  Lab Units 07/08/17  1057 07/06/17  0222 07/05/17  0158   WBC 10*3/mm3 12.30* 9.27 15.95*   HEMOGLOBIN g/dL 10.4* 12.1* 12.8*   HEMATOCRIT % 31.5* 35.4* 38.1*   PLATELETS 10*3/mm3 199 216 246          Results from last 7 days  Lab Units 07/11/17  0142 07/10/17  0338 07/09/17  0348   SODIUM mmol/L 147* 144 143   POTASSIUM mmol/L 3.7 4.1 3.7   CHLORIDE mmol/L 109 111* 111*   CO2 mmol/L 25.0 24.0 22.0*   BUN mg/dL 21 13 11   CREATININE mg/dL 1.07 0.98 0.91   CALCIUM mg/dL 8.9 8.6 8.0*   BILIRUBIN mg/dL  --  0.5  --    ALK PHOS U/L  --  155*  --    ALT (SGPT) U/L  --  42  --    AST (SGOT) U/L  --  51*  --    GLUCOSE mg/dL 143* 182* 108*        Lab Results   Component Value Date    MG 2.1 07/06/2017     No components found for: POCGLUC  No components found for: A1C  Lab Results   Component Value Date    HDL 36 02/04/2016     No components found for: B12  No results found for: TSH     Labs reviewed    Assessment/Plan     Hospital Problem List    Active Problems:    Angio-edema    Seizures    HCAP (healthcare-associated pneumonia)    Impression  52 year old admitted with seizures, malignant HTN, PRES, EtOH w/d. Agitation, intubated, sedated, C.diff, low grade temps      Plan  1. PRES  --BPS high again.  Primary team continues to titrate medications  --EEG showed no evidence of status. --On Keppra to 1000mg IV BID.  Will remain on this until MRI normal, recheck in 6 weeks  --No signs of CNS infection      2. DTs  --On Ativan, Propofol  --Intubated  --On Thiamine, FA  --Wean as tolerated     3. C.diff  --likely source of fevers  --On Flagyl      4. Tongue swelling: Appears about the same. Intubated currently    5.   GI:  Jevitiy 1.2 at 50/hr TF    6.  Resp:  Intubated, on Vent  --considering trach        Spoke with family.  Over 35 min face to face with family in counseling.       Harpreet Arroyo MD  07/11/17  9:59 AM       Electronically signed by Harpreet Arroyo MD at 7/11/2017 10:21 AM      EPI Jain at 7/11/2017 10:09 AM  Version 1 of 1         ENT/[facility] ([facility]) Progress Note:       LOS: 7 days   Patient Care Team:  Linda Hoffman DNP, APRN as PCP - General  Linda Hoffman DNP, APRN as PCP - Family Medicine    Active consulting complaint: tongue swelling/angioedema    Subjective     Interval History:     Status of active consulting problem: tongue swelling slightly improved per RN and family after famotidine and steroids    History taken from: family RN    Review of Systems:    Review of Systems   Unable to perform ROS: Intubated       Objective     Vital Signs  Temp:  [98.7 °F (37.1 °C)-99.2 °F (37.3 °C)] 99.2 °F (37.3 °C)  Heart Rate:  [] 85  Resp:  [12-22] 21  BP: (133-206)/() 191/116  FiO2 (%):  [40 %] 40 %    Physical Exam:   Physical Exam   Constitutional: No distress. He is sedated and intubated.   HENT:   ET tube noted- on vent. Tongue with minimal improvement in edema, bruised and protruding. Left lingual ulceration healing.    Pulmonary/Chest: No stridor. He is intubated. No respiratory distress.        Results Review:       Lab Results (last 24 hours)     Procedure Component Value Units Date/Time    Basic Metabolic Panel [[id number]]  (Abnormal) Collected:  07/11/17 0142    Specimen:  Blood Updated:  07/11/17 0222     Glucose 143 (H) mg/dL      BUN 21 mg/dL      Creatinine 1.07 mg/dL      Sodium 147 (H) mmol/L      Potassium 3.7 mmol/L      Chloride 109 mmol/L      CO2 25.0 mmol/L      Calcium 8.9 mg/dL      eGFR Non African Amer 73 mL/min/1.73      BUN/Creatinine Ratio 19.6     Anion Gap 13.0 mmol/L     Narrative:       GFR Normal >60  Chronic Kidney Disease <60  Kidney  Failure <15    Blood Gas, Arterial [[id number]]  (Abnormal) Collected:  07/11/17 0327    Specimen:  Arterial Blood Updated:  07/11/17 0330     Site Arterial: right radial     Gera's Test --      Documented in Rapid Comm        pH, Arterial 7.495 (H) pH units      pCO2, Arterial 31.1 (L) mm Hg      pO2, Arterial 76.1 (L) mm Hg      HCO3, Arterial 23.4 mmol/L      Base Excess, Arterial 1.1 mmol/L      O2 Saturation, Arterial 96.3 %      O2 Saturation Calculated 96.3 %      Barometric Pressure for Blood Gas -- mmHg       Component not reported at this site.        Modality Ventilator     FIO2 40 %      Ventilator Mode AC     Rate 10.0 Breaths/minute      PEEP 10.0     Vent CPAP/PEEP 10.0    Narrative:       Serial Number: [id number]    : [id number]        Imaging Results (last 24 hours)     Procedure Component Value Units Date/Time    XR Abdomen KUB [[id number]] Collected:  07/10/17 1314     Updated:  07/10/17 1319    Narrative:       HISTORY: Abdomen distended and firm.     FINDINGS: KUB radiograph demonstrates a nonspecific bowel gas pattern.  There is noted to be a distended loop of what I suspect represents the  sigmoid colon with a sigmoid volvulus in the differential. I do not see  evidence of obstruction or pneumatosis. There is no evidence of  pneumoperitoneum.       Impression:       . Nonspecific bowel gas pattern with gas in both small bowel  and colon. There is some asymmetric distention of what appears to be a  loop of the sigmoid colon with a sigmoid volvulus a differential  although considered unlikely given the lack of distention of the more  proximal bowel and the patient's age. If symptoms are persistent follow  up with CT imaging could be obtained for further characterization.  This report was finalized on 07/10/2017 13:16 by Dr. Dlishad Silver MD.    CT Abdomen Pelvis Without Contrast [[id number]] Collected:  07/10/17 2035     Updated:  07/10/17 2047    Narrative:       EXAMINATION: CT ABDOMEN  PELVIS WO CONTRAST- 7/10/2017 8:35 PM CDT     HISTORY: Abdominal distention and pain, possible sigmoid volvulus.     DOSE: 1821 mGycm (Automatic exposure control technique was implemented  in an effort to keep the radiation dose as low as possible without  compromising image quality)     REPORT: Spiral CT of the abdomen and pelvis was performed without  intravenous or oral contrast from the lung bases through the pubic  symphysis. Reconstructed coronal and sagittal images are also reviewed.  Comparison: KUB on the same date. There is no previous CT. Lung windows  demonstrate consolidation of the lower lobe laterally with air  bronchograms, probable bilateral pneumonia mixed with atelectasis. There  are tiny bilateral pleural effusions. A nasogastric tube is present in  good position. There is mild cardiomegaly. The liver and spleen appear  homogeneous. The gallbladder is slightly contracted. Pancreas and  adrenal glands are within normal limits. No nephrolithiasis is seen and  there is no hydronephrosis. There is perinephric fat stranding  bilaterally, which is nonspecific. There is no evidence of bowel  obstruction or sigmoid volvulus. The sigmoid colon is redundant. There  is moderate sigmoid diverticulosis. There is nonspecific increased  attenuation of fat planes in the pelvis, including presacral fat planes  and fat planes at the level of the aortic bifurcation and inguinal  regions. There is also mildly increased attenuation of fat planes over  the flanks, slightly greater on the right. There are fat containing  inguinal hernias bilaterally. No intra-abdominal abscess or free air is  identified. The inflammatory changes do not appear to be associated with  the sigmoid colon. The appendix is normal. There is a small  fat-containing periumbilical hernia that measures 2.6 cm. Review of bone  windows is unremarkable.       Impression:       1. No evidence of bowel obstruction or sigmoid volvulus. There  is  moderate sigmoid diverticulosis without acute diverticulitis.  2. Nonspecific mild fat stranding/inflammation within the  retroperitoneum and extraperitoneal fat planes in the pelvis, without  evidence of an abscess.  3. Extensive infiltrates in the lower lobes with air bronchograms  suspicious for acute lateral lower lobe pneumonia probably combined with  atelectasis.        This report was finalized on 07/10/2017 20:44 by Dr. Marvin Mar MD.    XR Chest 1 View [[id number]] Collected:  07/11/17 0722     Updated:  07/11/17 0726    Narrative:       EXAMINATION:   XR CHEST 1 VW-  7/11/2017 7:22 AM CDT     HISTORY: Respiratory failure.     Frontal upright radiograph of the chest 7/11/2017 3:15 AM CDT     COMPARISON: 07/10/2017.     FINDINGS:   The lungs are clear. Cardiac silhouettes mildly enlarged. Left  subclavian catheter and endotracheal tube are present and satisfactorily  positioned.      The osseous structures and surrounding soft tissues demonstrate no acute  abnormality.       Impression:       1. No radiographic evidence of acute cardiopulmonary process.        This report was finalized on 07/11/2017 07:23 by Dr. Adolph Echols MD.          Medication Review:     Current Facility-Administered Medications   Medication Dose Route Frequency Provider Last Rate Last Dose   • acetaminophen (TYLENOL) tablet 650 mg  650 mg Oral Q4H PRN Jairon Perez DO   650 mg at 07/09/17 1003   • albuterol (PROVENTIL) nebulizer solution 0.083% 2.5 mg/3mL  2.5 mg Nebulization Q6H - RT Jairon Perez DO   2.5 mg at 07/11/17 0629   • amLODIPine (NORVASC) tablet 5 mg  5 mg Per G Tube Q24H Cristino Encinas DO   5 mg at 07/11/17 0809   • chlorhexidine (PERIDEX) 0.12 % solution 15 mL  15 mL Mouth/Throat Q12H Cristino Encinas DO   15 mL at 07/11/17 0810   • CloNIDine (CATAPRES-TTS) 0.3 MG/24HR patch 1 patch  1 patch Transdermal Weekly Cristino Encinas DO       • dexamethasone sodium phosphate 6 mg in sodium chloride 0.9  % IVPB  6 mg Intravenous Q6H Cristino Encinas DO 0 mL/hr at 07/09/17 1245 6 mg at 07/11/17 0525   • dextromethorphan polistirex ER (DELSYM) 30 MG/5ML oral suspension 30 mg  30 mg Oral BID Robe Hiwot Dorsey, DO   30 mg at 07/11/17 0810   • enoxaparin (LOVENOX) syringe 40 mg  40 mg Subcutaneous Daily Cristino Encinas DO   40 mg at 07/11/17 0809   • famotidine (PEPCID) tablet 20 mg  20 mg Per G Tube Q12H Cristino Encinas DO   20 mg at 07/11/17 0809   • HYDROcodone-acetaminophen (NORCO)  MG per tablet 1 tablet  1 tablet Oral Q4H PRN Jairon Perez DO   1 tablet at 07/03/17 1758   • HYDROcodone-acetaminophen (NORCO) 5-325 MG per tablet 1 tablet  1 tablet Oral Q4H PRN Jairon Perez DO       • HYDROmorphone (DILAUDID) injection 1 mg  1 mg Intravenous Q3H PRN Jairon Peerz DO   1 mg at 07/10/17 1354    And   • naloxone (NARCAN) injection 0.4 mg  0.4 mg Intravenous Q5 Min PRN Jairon Perez DO       • ibuprofen (ADVIL,MOTRIN) 100 MG/5ML suspension 200 mg  200 mg Oral Q6H PRN Cristino Encinas DO   200 mg at 07/09/17 1342   • levETIRAcetam in NaCl 0.75% (KEPPRA) IVPB 1,000 mg  1,000 mg Intravenous Q12H Royal Campos MD 0 mL/hr at 07/09/17 0930 1,000 mg at 07/11/17 0810   • levoFLOXacin (LEVAQUIN) 750 mg/150 mL D5W (premix) (LEVAQUIN) 750 mg  750 mg Intravenous Q24H Cristino Encinas DO       • LORazepam (ATIVAN) tablet 1 mg  1 mg Oral Q2H PRN Cristino Encinas DO        Or   • LORazepam (ATIVAN) injection 1 mg  1 mg Intravenous Q2H PRN Cristino Encinas DO        Or   • LORazepam (ATIVAN) tablet 2 mg  2 mg Oral Q1H PRN Cristino Encinas, DO        Or   • LORazepam (ATIVAN) injection 2 mg  2 mg Intravenous Q1H PRN Cristino Encinas, DO   2 mg at 07/09/17 0336    Or   • LORazepam (ATIVAN) injection 2 mg  2 mg Intravenous Q15 Min PRN Cristino Encinas, DO        Or   • LORazepam (ATIVAN) injection 2 mg  2 mg Intramuscular Q15 Min PRN Cristino Encinas, DO        Or   • LORazepam (ATIVAN)  tablet 4 mg  4 mg Oral Q1H PRN Cristino Encinas DO        Or   • LORazepam (ATIVAN) injection 4 mg  4 mg Intravenous Q1H PRN Cristino Encinas DO   4 mg at 07/10/17 1659   • metoprolol tartrate (LOPRESSOR) tablet 100 mg  100 mg Nasogastric Q12H Cristino Encinas DO       • metroNIDAZOLE (FLAGYL) tablet 500 mg  500 mg Per G Tube Q8H Cristino Encinas DO   500 mg at 07/11/17 0808   • niCARdipine (CARDENE) 25 mg/250 mL (0.1 mg/mL) 0.9% NS infusion  5-15 mg/hr Intravenous Titrated Jairon Perez DO 50 mL/hr at 07/10/17 0448 5 mg/hr at 07/10/17 0448   • ondansetron (ZOFRAN) injection 4 mg  4 mg Intravenous Q6H PRN Jairon Perez DO   4 mg at 07/09/17 1715   • Pharmacy to dose vancomycin   Does not apply Continuous PRN Cristino Encinas DO       • piperacillin-tazobactam (ZOSYN) 3.375 g in iso-osmotic dextrose 50 ml (premix)  3.375 g Intravenous Q6H Cristino Encinas DO       • propofol (DIPRIVAN) infusion 10 mg/mL 100 mL  5-50 mcg/kg/min Intravenous Titrated Jairon Perez DO 37.3 mL/hr at 07/11/17 0906 75 mcg/kg/min at 07/11/17 0906   • sodium chloride 0.9 % flush 1-10 mL  1-10 mL Intravenous PRN Jairon Perez DO       • terazosin (HYTRIN) capsule 5 mg  5 mg Per G Tube Nightly Cristino Encinas DO       • thiamine (B-1) 100 mg in sodium chloride 0.9 % 100 mL IVPB  100 mg Intravenous Daily Jairon Perez  mL/hr at 07/11/17 0809 100 mg at 07/11/17 0809   • vancomycin 1250 mg/250 mL 0.9% NS IVPB ([facility])  1,250 mg Intravenous Q12H Cristino Encinas DO 0 mL/hr at 07/09/17 1000 1,250 mg at 07/11/17 0907       Assessment/Plan     Active Problems:    Angio-edema    Seizures    HCAP (healthcare-associated pneumonia)      Minimal improvement of tongue swelling after steroids and famotidine. Will discuss this patient with Dr. Pierson regarding considering FFP vs tracheostomy. Continue local care of tongue.       EPI Bowie  07/11/17  10:09 AM       Electronically signed by EPI Jain  at 7/11/2017 10:52 AM

## 2017-07-12 NOTE — SIGNIFICANT NOTE
Still on vent and sedated.  Not ready per RN.  Will check tomorrow to see if starting to extubate.  Daniela Bentley, MS CCC-SLP 7/12/2017 3:56 PM     07/12/17 1554   Rehab Treatment   Discipline speech language pathologist   Rehab Evaluation   Evaluation Not Performed unable to evaluate, medical status change

## 2017-07-12 NOTE — ANESTHESIA PREPROCEDURE EVALUATION
Anesthesia Evaluation     Patient summary reviewed and Nursing notes reviewed   NPO Solid Status: > 8 hours       Airway   Mallampati: IV  TM distance: >3 FB  difficult intubation highly probable  Dental      Pulmonary - normal exam   (+) pneumonia stable , shortness of breath (Resp failure on VENT),   (-) rhonchi  Cardiovascular   Exercise tolerance: unable to assess    NYHA Classification: I  ECG reviewed  Rhythm: regular  Rate: normal    (+) hypertension (malignant hypertension) poorly controlled, hyperlipidemia  (-) past MI      Neuro/Psych  (+) seizures well controlled,    GI/Hepatic/Renal/Endo    (+) obesity, morbid obesity,     Musculoskeletal     Abdominal   (+) obese,     Abdomen: soft.   Substance History   (+) alcohol use,      OB/GYN          Other        (-) arthritis      Phys Exam Other: Tongue is swollen, intubated.                                Anesthesia Plan    ASA 3     general     intravenous induction   Anesthetic plan and risks discussed with other.

## 2017-07-12 NOTE — THERAPY TREATMENT NOTE
Acute Care - Physical Therapy Treatment Note  [facility]     Patient Name: Nishant Savage  : 1965  MRN: [id number]  Today's Date: 2017  Onset of Illness/Injury or Date of Surgery Date: 17  Date of Referral to PT: 17  Referring Physician: Dr. Encinas    Admit Date: 7/3/2017    Visit Dx:    ICD-10-CM ICD-9-CM   1. Oropharyngeal dysphagia R13.12 787.22   2. Seizures R56.9 780.39   3. Angioedema, sequela T78.3XXS 909.9   4. Alcohol withdrawal, with delirium F10.231 291.0   5. Impaired mobility Z74.09 799.89     Patient Active Problem List   Diagnosis   • Hyperlipidemia   • HTN (hypertension)   • Gout   • Anxiety   • Arthritis   • Erectile dysfunction   • Angio-edema   • Seizures   • HCAP (healthcare-associated pneumonia)               Adult Rehabilitation Note       17 0836 17 0832       Rehab Assessment/Intervention    Discipline physical therapy assistant  - physical therapy assistant  -     Document Type therapy note (daily note)  - therapy note (daily note)  -     Subjective Information unable to respond  -MF unable to respond   pt's sedation off near end of tx and pt. waking up slightly  -     Precautions/Limitations oxygen therapy device and L/min   -vent, restraints, c-diff  -MF oxygen therapy device and L/min   vent, restraints, c-diff  -MF     Recorded by [MF] Uma Restrepo PTA [MF] Uma Restrepo PTA     Pain Assessment    Pain Assessment Rivera-Baker FACES  - Rivera-Baker FACES  -     Rivera-Baker FACES Pain Rating 0  - 0  -MF     Recorded by [MF] Uma Restrepo PTA [MF] Uma Restrepo PTA     Therapy Exercises    Bilateral Lower Extremities PROM:;20 reps;supine;ankle pumps/circles;calf stretch;heel slides;hip abduction/adduction;SLR;hip IR  -MF PROM:;20 reps;supine;ankle pumps/circles;calf stretch;heel slides;hip abduction/adduction;hip IR;SLR  -MF     Bilateral Upper Extremity PROM:;20 reps;supine;elbow flexion/extension;hand  pumps;shoulder abduction/adduction;shoulder extension/flexion   while sedation paused, pt. squeezed MD's hand  -MF PROM:;20 reps;supine;elbow flexion/extension;hand pumps;shoulder abduction/adduction;shoulder extension/flexion  -MF     Recorded by [MUUL] Uma Restrepo PTA [MF] Uma Restrepo PTA     Positioning and Restraints    Pre-Treatment Position in bed  -MF in bed  -MF     Post Treatment Position bed  -MF bed  -MF     In Bed fowlers;call light within reach;patient within staff view;notified nsg;side rails up x2;SCD pump applied;LUE elevated;RUE elevated;L heel elevated;R heel elevated  -MF fowlers;notified nsg;call light within reach;patient within staff view;side rails up x2;SCD pump applied  -MF     Restraints released:;reapplied:;notified nsg:  -MF released:;reapplied:;notified nsg:  -MF     Recorded by [MULU] Uma Restrepo PTA [] Uma Restrepo PTA       User Key  (r) = Recorded By, (t) = Taken By, (c) = Cosigned By    Initials Name Effective Dates    MULU Restrepo PTA 08/02/16 -                 IP PT Goals       07/10/17 0810          Bed Mobility PT LTG    Bed Mobility PT LTG, Date Established 07/10/17  -MARJORIE      Bed Mobility PT LTG, Time to Achieve by discharge  -MARJORIE      Bed Mobility PT LTG, Activity Type roll left/roll right  -MARJORIE      Bed Mobility PT LTG, [place] Level moderate assist (50% patient effort)  -MARJORIE      Bed Mobility PT Goal  LTG, Assist Device bed rails  -MARJORIE      Strength Goal PT LTG    Strength Goal PT LTG, Date Established 07/10/17  -MARJORIE      Strength Goal PT LTG, Time to Achieve by discharge  -MARJORIE      Strength Goal PT LTG, Measure to Achieve AAROM/AROM, B UE/LE, 10-20 reps, min assist  -MARJORIE      Physical Therapy PT LTG    Physical Therapy PT LTG, Date Established 07/10/17  -MARJORIE      Physical Therapy PT LTG, Time to Achieve by discharge  -MARJORIE      Physical Therapy PT LTG, Activity Type No new evidence of skin breakdown or contractures while pt. on the vent.    -MARJORIE        User Key  (r) = Recorded By, (t) = Taken By, (c) = Cosigned By    Initials Name Provider Type    MARJORIE Olsen, PT DPT Physical Therapist          Physical Therapy Education     Title: PT OT SLP Therapies (Active)     Topic: Physical Therapy (Active)     Point: Mobility training (Active)    Learning Progress Summary    Learner Readiness Method Response Comment Documented by Status   Patient Nonacceptance E NL Pt. sedated on vent, no evidence of learning.  07/11/17 0905 Active               Point: Home exercise program (Active)    Learning Progress Summary    Learner Readiness Method Response Comment Documented by Status   Patient Nonacceptance E NL Pt. sedated on vent, no evidence of learning.  07/12/17 0837 Active    Nonacceptance E NL Pt. sedated on vent, no evidence of learning.  07/11/17 0905 Active    Acceptance E NR Educated pt on progression of PT POC and benefits of activity  07/10/17 0810 Active               Point: Body mechanics (Active)    Learning Progress Summary    Learner Readiness Method Response Comment Documented by Status   Patient Nonacceptance E NL Pt. sedated on vent, no evidence of learning.  07/11/17 0905 Active               Point: Precautions (Active)    Learning Progress Summary    Learner Readiness Method Response Comment Documented by Status   Patient Nonacceptance E NL Pt. sedated on vent, no evidence of learning.  07/11/17 0905 Active                      User Key     Initials Effective Dates Name Provider Type Discipline     08/02/16 -  Jaime Olsen, PT DPT Physical Therapist PT     08/02/16 -  Uma Restrepo PTA Physical Therapy Assistant PT                    PT Recommendation and Plan  Anticipated Discharge Disposition:  (unknown while on the vent)  Planned Therapy Interventions: (S) bed mobility training, balance training, home exercise program, patient/family education, ROM (Range of Motion), strengthening (PT will re-eval to update  POC/goals once extubated. )  PT Frequency: daily, 2 times/day  Plan of Care Review  Plan Of Care Reviewed With: patient  Progress: no change  Outcome Summary/Follow up Plan: Pt. continues to be sedated on vent. Performed PROM and stretching to all extremities. While Dr. Arroyo was present, sedation was paused and pt. squeeze each of his hands. Will contnue to work with pt., preventing contractures, while he is here.          Outcome Measures       07/12/17 0836 07/11/17 0832 07/10/17 0810    How much help from another person do you currently need...    Turning from your back to your side while in flat bed without using bedrails? 1  -MF 1  -MF 1  -MARJORIE    Moving from lying on back to sitting on the side of a flat bed without bedrails? 1  -MF 1  -MF 1  -MARJORIE    Moving to and from a bed to a chair (including a wheelchair)? 1  -MF 1  -MF 1  -MARJORIE    Standing up from a chair using your arms (e.g., wheelchair, bedside chair)? 1  -MF 1  -MF 1  -MARJORIE    Climbing 3-5 steps with a railing? 1  -MF 1  -MF 1  -MARJORIE    To walk in hospital room? 1  -MF 1  -MF 1  -MARJORIE    AM-PAC 6 Clicks Score 6  -MF 6  -MF 6  -MARJORIE    Functional Assessment    Outcome Measure Options AM-PAC 6 Clicks Basic Mobility (PT)  -MF AM-PAC 6 Clicks Basic Mobility (PT)  -MF AM-PAC 6 Clicks Basic Mobility (PT)  -MARJORIE      User Key  (r) = Recorded By, (t) = Taken By, (c) = Cosigned By    Initials Name Provider Type    MARJORIE Olsen, PT DPT Physical Therapist     Uma Restrepo, PTA Physical Therapy Assistant           Time Calculation:         PT Charges       07/12/17 0922          Time Calculation    Start Time 0836  -      Stop Time 0900  -      Time Calculation (min) 24 min  -      PT Non-Billable Time (min) 0 min  -      PT Received On 07/12/17  -      PT Goal Re-Cert Due Date 07/20/17  -      Time Calculation- PT    Total Timed Code Minutes- PT 24 minute(s)  -        User Key  (r) = Recorded By, (t) = Taken By, (c) = Cosigned By    Initials  Name Provider Type     Uma Restrepo PTA Physical Therapy Assistant          Therapy Charges for Today     Code Description Service Date Service Provider Modifiers Qty    96412911153 HC PT THER PROC EA 15 MIN 7/11/2017 Uma Restrepo PTA GP 2    25326424857 HC PT THER PROC EA 15 MIN 7/12/2017 Uma Restrepo PTA GP 2          PT G-Codes  Outcome Measure Options: AM-PAC 6 Clicks Basic Mobility (PT)  Score: 6  Functional Limitation: Mobility: Walking and moving around  Mobility: Walking and Moving Around Current Status (): 100 percent impaired, limited or restricted  Mobility: Walking and Moving Around Goal Status (): At least 80 percent but less than 100 percent impaired, limited or restricted    Uma Restrepo PTA  7/12/2017

## 2017-07-12 NOTE — PROGRESS NOTES
Neurology Progress Note      Chief Complaint:  Seizure, Malignant HTN, PRES, EtOH w/d, Respiratory failure on Vent    Subjective     Subjective:    No new issues.  Trach planned by ENT.  Waking up with sedation paused.    Medications:  Current Facility-Administered Medications   Medication Dose Route Frequency Provider Last Rate Last Dose   • acetaminophen (TYLENOL) tablet 650 mg  650 mg Oral Q4H PRN Jairon Perez DO   650 mg at 07/09/17 1003   • albuterol (PROVENTIL) nebulizer solution 0.083% 2.5 mg/3mL  2.5 mg Nebulization Q6H - RT Jairon Perez DO   2.5 mg at 07/12/17 0632   • amLODIPine (NORVASC) tablet 5 mg  5 mg Per G Tube Q24H Cristino Encinas DO   5 mg at 07/11/17 0809   • chlorhexidine (PERIDEX) 0.12 % solution 15 mL  15 mL Mouth/Throat Q12H Cristino Encinas DO   15 mL at 07/11/17 2243   • CloNIDine (CATAPRES-TTS) 0.3 MG/24HR patch 1 patch  1 patch Transdermal Weekly Cristino Encinas DO   1 patch at 07/11/17 1423   • dexamethasone sodium phosphate 6 mg in sodium chloride 0.9 % IVPB  6 mg Intravenous Q6H Cristino Encinas DO 0 mL/hr at 07/09/17 1245 6 mg at 07/12/17 0605   • dextromethorphan polistirex ER (DELSYM) 30 MG/5ML oral suspension 30 mg  30 mg Oral BID Robe Dorsey, DO   30 mg at 07/11/17 1827   • enoxaparin (LOVENOX) syringe 40 mg  40 mg Subcutaneous Daily Cristino Encinas DO   40 mg at 07/11/17 0809   • famotidine (PEPCID) tablet 20 mg  20 mg Per G Tube Q12H Cristino Encinas DO   20 mg at 07/12/17 0844   • HYDROcodone-acetaminophen (NORCO)  MG per tablet 1 tablet  1 tablet Oral Q4H PRN Jairon Perez DO   1 tablet at 07/03/17 1758   • HYDROcodone-acetaminophen (NORCO) 5-325 MG per tablet 1 tablet  1 tablet Oral Q4H PRN Jairon C Perez, DO       • HYDROmorphone (DILAUDID) injection 1 mg  1 mg Intravenous Q3H PRN Jairon Perez,    1 mg at 07/10/17 1354    And   • naloxone (NARCAN) injection 0.4 mg  0.4 mg Intravenous Q5 Min PRN Jairon Perez, DO       •  ibuprofen (ADVIL,MOTRIN) 100 MG/5ML suspension 200 mg  200 mg Oral Q6H PRN Cristino Encinas, DO   200 mg at 07/09/17 1342   • levETIRAcetam in NaCl 0.75% (KEPPRA) IVPB 1,000 mg  1,000 mg Intravenous Q12H Royal Campos MD 0 mL/hr at 07/09/17 0930 1,000 mg at 07/12/17 0843   • levoFLOXacin (LEVAQUIN) 750 mg/150 mL D5W (premix) (LEVAQUIN) 750 mg  750 mg Intravenous Q24H Cristino Encinas, DO   750 mg at 07/11/17 1126   • LORazepam (ATIVAN) tablet 1 mg  1 mg Oral Q2H PRN Cristino Encinas, DO        Or   • LORazepam (ATIVAN) injection 1 mg  1 mg Intravenous Q2H PRN Cristino Encinas, DO        Or   • LORazepam (ATIVAN) tablet 2 mg  2 mg Oral Q1H PRN Cristino Encinas, DO        Or   • LORazepam (ATIVAN) injection 2 mg  2 mg Intravenous Q1H PRN Cristino Encinas, DO   2 mg at 07/09/17 0336    Or   • LORazepam (ATIVAN) injection 2 mg  2 mg Intravenous Q15 Min PRN Cristino Encinas DO        Or   • LORazepam (ATIVAN) injection 2 mg  2 mg Intramuscular Q15 Min PRN Cristino Encinas, DO        Or   • LORazepam (ATIVAN) tablet 4 mg  4 mg Oral Q1H PRN Cristino Encinas, DO        Or   • LORazepam (ATIVAN) injection 4 mg  4 mg Intravenous Q1H PRN Cristino Encinas, DO   4 mg at 07/11/17 1145   • metoprolol tartrate (LOPRESSOR) tablet 100 mg  100 mg Nasogastric Q12H Cristino Encinas, DO       • metroNIDAZOLE (FLAGYL) tablet 500 mg  500 mg Per G Tube Q8H Cristino Encinas, DO   500 mg at 07/12/17 0700   • niCARdipine (CARDENE) 25 mg/250 mL (0.1 mg/mL) 0.9% NS infusion  5-15 mg/hr Intravenous Titrated Jairon Perez, DO 50 mL/hr at 07/10/17 0448 5 mg/hr at 07/10/17 0448   • ondansetron (ZOFRAN) injection 4 mg  4 mg Intravenous Q6H PRN Jairon Perez, DO   4 mg at 07/09/17 1715   • Pharmacy to dose vancomycin   Does not apply Continuous PRN Cristino Encinas DO       • piperacillin-tazobactam (ZOSYN) 3.375 g in iso-osmotic dextrose 50 ml (premix)  3.375 g Intravenous Q6H Cristino Encinas, DO 0 mL/hr at  07/12/17 0200 3.375 g at 07/12/17 0603   • propofol (DIPRIVAN) infusion 10 mg/mL 100 mL  5-50 mcg/kg/min Intravenous Titrated Jiaron Perez DO 37.3 mL/hr at 07/12/17 0841 75 mcg/kg/min at 07/12/17 0841   • Scopolamine (TRANSDERM-SCOP) 1.5 MG/3DAYS patch 1 patch  1 patch Transdermal Q72H Erwin Glass MD   1 patch at 07/12/17 0253   • sodium chloride 0.9 % flush 1-10 mL  1-10 mL Intravenous PRN Jairon Perez DO       • terazosin (HYTRIN) capsule 5 mg  5 mg Per G Tube Nightly Cristino Encinas DO   5 mg at 07/11/17 2242   • thiamine (B-1) 100 mg in sodium chloride 0.9 % 100 mL IVPB  100 mg Intravenous Daily Jairon Perez  mL/hr at 07/11/17 0809 100 mg at 07/11/17 0809   • vancomycin 1250 mg/250 mL 0.9% NS IVPB ([facility])  1,250 mg Intravenous Q12H Cristino Encinas DO 0 mL/hr at 07/09/17 1000 1,250 mg at 07/11/17 2243       Review of Systems:   Could not obtain      Objective      Vital Signs  Temp:  [97.7 °F (36.5 °C)-99.4 °F (37.4 °C)] 99.4 °F (37.4 °C)  Heart Rate:  [47-80] 57  Resp:  [13-23] 17  BP: (154-186)/() 176/100  FiO2 (%):  [40 %] 40 %    Physical Exam:  Heavily sedated  --We will squeeze hands to command bilaterally  NC/AT, tongue remains swollen  Sclera anicteric  Coarse BS bilaterally  RRR  Abdomen distended  4+ edema in feet and hands    Neuro:  Awakens to loud voice and pain.  Opens eyes  --Squeezes hand and wiggles toes  --Will track some with eyes  --PERRL  --Positive Blink  --Moving all extremities, more so off sedation  --No w/d to pain currently  --DTRs absent throughout, equivocal Bab[place]i     Results Review:    I reviewed the patient's new clinical results.      Results from last 7 days  Lab Units 07/12/17  0226 07/08/17  1057 07/06/17  0222   WBC 10*3/mm3 16.64* 12.30* 9.27   HEMOGLOBIN g/dL 10.4* 10.4* 12.1*   HEMATOCRIT % 31.3* 31.5* 35.4*   PLATELETS 10*3/mm3 349 199 216          Results from last 7 days  Lab Units 07/12/17  0226 07/11/17  0142 07/10/17  0338   SODIUM  mmol/L 147* 147* 144   POTASSIUM mmol/L 3.3* 3.7 4.1   CHLORIDE mmol/L 106 109 111*   CO2 mmol/L 28.0 25.0 24.0   BUN mg/dL 29* 21 13   CREATININE mg/dL 1.06 1.07 0.98   CALCIUM mg/dL 9.0 8.9 8.6   BILIRUBIN mg/dL  --   --  0.5   ALK PHOS U/L  --   --  155*   ALT (SGPT) U/L  --   --  42   AST (SGOT) U/L  --   --  51*   GLUCOSE mg/dL 147* 143* 182*        Lab Results   Component Value Date    MG 2.1 07/06/2017     No components found for: POCGLUC  No components found for: A1C  Lab Results   Component Value Date    HDL 36 02/04/2016     No components found for: B12  No results found for: TSH     Labs reviewed    Assessment/Plan     Hospital Problem List    Active Problems:    Angio-edema    Seizures    HCAP (healthcare-associated pneumonia)    Impression  52 year old admitted with seizures, malignant HTN, PRES, EtOH w/d. Agitation, intubated, sedated, C.diff, low grade temps      Plan  1. PRES  --BPS remain somewhat.  Primary team continues to titrate medications  --EEG showed no evidence of status. --On Keppra to 1000mg IV BID.  Will remain on this until MRI normal, recheck in 6 weeks  --No signs of CNS infection      2. DTs  --On Ativan, Propofol  --Intubated  --On Thiamine, FA  --Wean as tolerated     3. C.diff  --likely source of fevers  --On Flagyl      4. Tongue swelling: slowly improving    5.  GI:  Jevitiy 1.2 at 50/hr TF    6.  Resp:  Intubated, on Vent  --considering trach      Harpreet Arroyo MD  07/12/17  8:48 AM

## 2017-07-12 NOTE — PROGRESS NOTES
[facility] Medicine Services  INPATIENT PROGRESS NOTE    Length of Stay: 8  Date of Admission: 7/3/2017  Primary Care Physician: Linda Hoffman, DNP, APRN    Subjective     Chief Complaint:     The patient is intubated and sedated    HPI     The patient awakens with pause in sedation.  He is able to follow some commands.  Edema of his extremities persists.  This time remains bruised and swollen and protruding and is a definite source for upper airway obstruction if extubated.  Tracheostomy is planned for today.  Temperature elevated to 99.4 this morning.  Blood pressure remains elevated despite multiple medications.  Beta blocker held this a.m. secondary to heart rate in the low 50s.  The patient has had 2 bowel movements, both loose.  I discussed his case with his family again today and they are encouraged that he is having a tracheostomy which should aid us in eventual extubation.        Review of Systems   Unable to obtain secondary to intubation and sedation  All pertinent negatives and positives are as above. All other systems have been reviewed and are negative unless otherwise stated.     Objective    Temp:  [97.7 °F (36.5 °C)-99.4 °F (37.4 °C)] 99.4 °F (37.4 °C)  Heart Rate:  [47-80] 57  Resp:  [13-23] 17  BP: (154-182)/() 176/100  FiO2 (%):  [40 %] 40 %    Lab Results (last 24 hours)     Procedure Component Value Units Date/Time    Blood Gas, Arterial [[id number]]  (Abnormal) Collected:  07/12/17 0310    Specimen:  Arterial Blood Updated:  07/12/17 0314     Site Arterial: right radial     Gera's Test --      Documented in Rapid Comm        pH, Arterial 7.498 (H) pH units      pCO2, Arterial 36.6 mm Hg      pO2, Arterial 76.8 (L) mm Hg      HCO3, Arterial 27.8 (H) mmol/L      Base Excess, Arterial 4.6 (H) mmol/L      O2 Saturation, Arterial 96.4 %      O2 Saturation Calculated 96.4 %      Barometric Pressure for Blood Gas -- mmHg       Component not reported at  this site.        Modality Ventilator     FIO2 40 %      Ventilator Mode AC     Rate 10.0 Breaths/minute      PEEP 10.0     Vent CPAP/PEEP 10.0    Narrative:       Serial Number: [id number]    : [id number]    Basic Metabolic Panel [[id number]]  (Abnormal) Collected:  07/12/17 0226    Specimen:  Blood Updated:  07/12/17 0334     Glucose 147 (H) mg/dL      BUN 29 (H) mg/dL      Creatinine 1.06 mg/dL      Sodium 147 (H) mmol/L      Potassium 3.3 (L) mmol/L      Chloride 106 mmol/L      CO2 28.0 mmol/L      Calcium 9.0 mg/dL      eGFR Non African Amer 74 mL/min/1.73      BUN/Creatinine Ratio 27.4 (H)     Anion Gap 13.0 mmol/L     CBC Auto Differential [[id number]]  (Abnormal) Collected:  07/12/17 0226    Specimen:  Blood Updated:  07/12/17 0416     WBC 16.64 (H) 10*3/mm3      RBC 3.49 (L) 10*6/mm3      Hemoglobin 10.4 (L) g/dL      Hematocrit 31.3 (L) %      MCV 89.7 fL      MCH 29.8 pg      MCHC 33.2 g/dL      RDW 14.7 %      RDW-SD 47.8 fl      MPV 9.6 fL      Platelets 349 10*3/mm3     Scan Slide [[id number]] Collected:  07/12/17 0226    Specimen:  Blood Updated:  07/12/17 0416     Scan Slide --      See Manual Differential Results       Manual Differential [[id number]]  (Abnormal) Collected:  07/12/17 0226    Specimen:  Blood Updated:  07/12/17 0416     Neutrophil % 61.0 %      Lymphocyte % 23.0 %      Monocyte % 3.0 (L) %      Bands %  7.0 %      Metamyelocyte % 2.0 (H) %      Myelocyte % 3.0 (H) %      Atypical Lymphocyte % 1.0 %      Neutrophils Absolute 11.32 (H) 10*3/mm3      Lymphocytes Absolute 3.83 10*3/mm3      Monocytes Absolute 0.50 10*3/mm3      Hypochromia Slight/1+     WBC Morphology Normal     Platelet Morphology Normal          Imaging Results (last 24 hours)     Procedure Component Value Units Date/Time    XR Chest 1 View [[id number]] Collected:  07/12/17 0705     Updated:  07/12/17 0710    Narrative:       EXAMINATION:   XR CHEST 1 VW-  7/12/2017 7:05 AM CDT     HISTORY: Respiratory failure     Single  view the chest obtained. Left lung is clear. Mild right  infrahilar atelectasis is present. Left diaphragms indistinct consistent  with atelectasis left lower lobe. Infiltrate tube nasogastric tube are  present. Left subclavian catheter is present.     IMPRESSION  1. Atelectasis left lower lobe.        2. Mild atelectasis present in the right infrahilar region.  This report was finalized on 07/12/2017 07:07 by Dr. Adolph Echols MD.    XR Abdomen KUB [[id number]] Collected:  07/12/17 0713     Updated:  07/12/17 0716    Narrative:       EXAMINATION:   XR ABDOMEN KUB-  7/12/2017 7:13 AM CDT     HISTORY: NG tube     Single view the abdomen is obtained. Nasogastric tube is present  satisfactorily positioned in the fundus the stomach.       Impression:       Satisfactory placement of nasogastric tube  This report was finalized on 07/12/2017 07:13 by Dr. Adolph Echols MD.             Intake/Output Summary (Last 24 hours) at 07/12/17 1030  Last data filed at 07/12/17 0933   Gross per 24 hour   Intake          3534.71 ml   Output             5825 ml   Net         -2290.29 ml       Physical Exam    Constitutional: No distress.   Seen and discussed with his nurse, Brittani. Family present.  He remains sedated with Ativan and different than ETT secure, Central line present.    HENT:   Head: Normocephalic and atraumatic.   Eyes: Pupils are equal, round, and reactive to light. The patient's tongue edema is improved.  It remains bruised and protruding.   Neck: Neck supple. No JVD present.   Cardiovascular: Normal rate and regular rhythm. Diffuse edema is noted in both the upper and lower extremities.  Pulmonary/Chest: Effort normal and breath sounds normal.   Ventilated.    Abdominal: Soft. Bowel sounds are normal.   Musculoskeletal: He exhibits decreased edema compared to yesterday.   Neurological:   Currently he is sedated with propofol and Ativan.  The patient is still not appropriate for extubation because of his lingual  swelling and agitation off sedation. Patient is more responsive and will follow commands such as moving feet and hands but still becomes agitated off sedation  Awakens to loud voice and pain. Opens eyes  --Squeezes hand and wiggles toes  --Will track some with eyes  --PERRL  --Positive Blink  --Moving all extremities, more so off sedation  --No w/d to pain currently  --DTRs absent throughout, equivocal Bab[place]i  Skin: Skin is warm and dry.      Results Review:  I have reviewed the labs, radiology results, and diagnostic studies since my last progress note and made treatment changes reflective of the results.   I have reviewed the current medications.    Assessment/Plan     Hospital Problem List     Angio-edema    Seizures    HCAP (healthcare-associated pneumonia)      1. Possible angioedema related to lisinopril.  2. Malignant hypertension.  3. Possible new onset seizure disorder versus convulsive syncope.  4. Posterior reversible encephalopathy syndrome on MRI.  5. Tongue bite with sublingual hematoma.  6. Tobacco abuse.  7. Obstructive sleep apnea, noncompliant with device.  8. Gouty arthritis.  9. Daily alcohol use with Delirium tremens requiring sedation and mechanical ventilation.  10. Hypokalemia.   11. C. Difficile colitilis  12. Lingual or sublingual infection, MSSA  13. Anasarca, improved    PLAN:  Tracheostomy placement for today  Crease Decadron to 4 mg every 6 hours  Increase terazosin to 10 mg  Bilateral renal artery ultrasound to assess for possible stenosis given difficult to control blood pressure  Hold beta blocker for heart rate less than 50.    Cristino Encinas DO   07/12/17   10:30 AM     Approximately 45 minutes of critical care time were spent managing the patient exclusive of billable procedures.

## 2017-07-12 NOTE — ANESTHESIA POSTPROCEDURE EVALUATION
Patient: Nishant Savage    Procedure Summary     Date Anesthesia Start Anesthesia Stop Room / Location    07/12/17 1244 1394  [facility] OR 09 / [facility] OR       Procedure Diagnosis Surgeon Provider    TRACHEOSTOMY WITH TRACHEOSCOPY (N/A Neck) Angioedema, subsequent encounter  (Angioedema, subsequent encounter [T78.3XXD]) MD Cristi Craig CRNA          Anesthesia Type: general  Last vitals  BP      Temp      Pulse     Resp      SpO2        Post Anesthesia Care and Evaluation    Patient location during evaluation: ICU  Patient participation: complete - patient cannot participate  Level of consciousness: obtunded/minimal responses  Airway patency: patent  Anesthetic complications: No anesthetic complications  PONV Status: none  Cardiovascular status: acceptable  Respiratory status: acceptable and intubated  Hydration status: acceptable

## 2017-07-12 NOTE — PROGRESS NOTES
ENT/[facility] ([facility]) Progress Note:       LOS: 8 days   Patient Care Team:  Linda Hoffman DNP, EPI as PCP - General  Linda Hoffman DNP, APRN as PCP - Family Medicine    Active consulting complaint: Tongue swelling    Subjective     Interval History:     Status of active consulting problem: Tongue swelling persists.  His tongue still was not fit within the dental arch.  As per respiratory, his vent settings are minimal and would be able to be extubated from a pulmonary standpoint.  The problem, however, is the persistent angioedema and concern for airway obstruction.  He has received steroids, antihistamines, and FFP without significant improvement.    He is now been intubated for 8 days and the family is wishing to proceed with a tracheostomy.  I had a discussion with the mother and sister about the risks, benefits, alternatives, and potential complications of a tracheostomy.  They are interested in proceeding.    History taken from: chart family RN    Review of Systems:    Review of Systems   Unable to perform ROS: Intubated       Objective     Vital Signs  Temp:  [97.7 °F (36.5 °C)-99.4 °F (37.4 °C)] 99.4 °F (37.4 °C)  Heart Rate:  [47-80] 57  Resp:  [13-23] 17  BP: (154-186)/() 176/100  FiO2 (%):  [40 %] 40 %    Physical Exam:   Physical Exam   Constitutional: He appears well-developed and well-nourished. He is sedated and intubated.   HENT:   Head: Normocephalic and atraumatic.   Right Ear: External ear normal.   Left Ear: External ear normal.   Nose: Nose normal.   The tongue is still ecchymotic and swollen.  This does not fit within the dental arch.   Eyes: Conjunctivae are normal.   Neck: Normal range of motion. Neck supple. No tracheal deviation present. No thyromegaly present.   Pulmonary/Chest: He is intubated.   Lymphadenopathy:     He has no cervical adenopathy.   Skin: He is not diaphoretic.        Results Review:       Lab Results (last 24 hours)     Procedure Component Value Units  Date/Time    Blood Gas, Arterial [[id number]]  (Abnormal) Collected:  07/12/17 0310    Specimen:  Arterial Blood Updated:  07/12/17 0314     Site Arterial: right radial     Gera's Test --      Documented in Rapid Comm        pH, Arterial 7.498 (H) pH units      pCO2, Arterial 36.6 mm Hg      pO2, Arterial 76.8 (L) mm Hg      HCO3, Arterial 27.8 (H) mmol/L      Base Excess, Arterial 4.6 (H) mmol/L      O2 Saturation, Arterial 96.4 %      O2 Saturation Calculated 96.4 %      Barometric Pressure for Blood Gas -- mmHg       Component not reported at this site.        Modality Ventilator     FIO2 40 %      Ventilator Mode AC     Rate 10.0 Breaths/minute      PEEP 10.0     Vent CPAP/PEEP 10.0    Narrative:       Serial Number: [id number]    : [id number]    Basic Metabolic Panel [[id number]]  (Abnormal) Collected:  07/12/17 0226    Specimen:  Blood Updated:  07/12/17 0334     Glucose 147 (H) mg/dL      BUN 29 (H) mg/dL      Creatinine 1.06 mg/dL      Sodium 147 (H) mmol/L      Potassium 3.3 (L) mmol/L      Chloride 106 mmol/L      CO2 28.0 mmol/L      Calcium 9.0 mg/dL      eGFR Non African Amer 74 mL/min/1.73      BUN/Creatinine Ratio 27.4 (H)     Anion Gap 13.0 mmol/L     Narrative:       GFR Normal >60  Chronic Kidney Disease <60  Kidney Failure <15    CBC & Differential [[id number]] Collected:  07/12/17 0226    Specimen:  Blood Updated:  07/12/17 0416    Narrative:       The following orders were created for panel order CBC & Differential.  Procedure                               Abnormality         Status                     ---------                               -----------         ------                     Manual Differential[[id number]]          Abnormal            Final result               Scan Slide[[id number]]                                       Final result               CBC Auto Differential[[id number]]        Abnormal            Final result                 Please view results for these tests on the individual  orders.    CBC Auto Differential [[id number]]  (Abnormal) Collected:  07/12/17 0226    Specimen:  Blood Updated:  07/12/17 0416     WBC 16.64 (H) 10*3/mm3      RBC 3.49 (L) 10*6/mm3      Hemoglobin 10.4 (L) g/dL      Hematocrit 31.3 (L) %      MCV 89.7 fL      MCH 29.8 pg      MCHC 33.2 g/dL      RDW 14.7 %      RDW-SD 47.8 fl      MPV 9.6 fL      Platelets 349 10*3/mm3     Scan Slide [[id number]] Collected:  07/12/17 0226    Specimen:  Blood Updated:  07/12/17 0416     Scan Slide --      See Manual Differential Results       Manual Differential [[id number]]  (Abnormal) Collected:  07/12/17 0226    Specimen:  Blood Updated:  07/12/17 0416     Neutrophil % 61.0 %      Lymphocyte % 23.0 %      Monocyte % 3.0 (L) %      Bands %  7.0 %      Metamyelocyte % 2.0 (H) %      Myelocyte % 3.0 (H) %      Atypical Lymphocyte % 1.0 %      Neutrophils Absolute 11.32 (H) 10*3/mm3      Lymphocytes Absolute 3.83 10*3/mm3      Monocytes Absolute 0.50 10*3/mm3      Hypochromia Slight/1+     WBC Morphology Normal     Platelet Morphology Normal        Imaging Results (last 24 hours)     Procedure Component Value Units Date/Time    XR Chest 1 View [[id number]] Collected:  07/12/17 0705     Updated:  07/12/17 0710    Narrative:       EXAMINATION:   XR CHEST 1 VW-  7/12/2017 7:05 AM CDT     HISTORY: Respiratory failure     Single view the chest obtained. Left lung is clear. Mild right  infrahilar atelectasis is present. Left diaphragms indistinct consistent  with atelectasis left lower lobe. Infiltrate tube nasogastric tube are  present. Left subclavian catheter is present.     IMPRESSION  1. Atelectasis left lower lobe.        2. Mild atelectasis present in the right infrahilar region.  This report was finalized on 07/12/2017 07:07 by Dr. Adolph Echols MD.    XR Abdomen KUB [[id number]] Collected:  07/12/17 0713     Updated:  07/12/17 0716    Narrative:       EXAMINATION:   XR ABDOMEN KUB-  7/12/2017 7:13 AM CDT     HISTORY: NG tube      Single view the abdomen is obtained. Nasogastric tube is present  satisfactorily positioned in the fundus the stomach.       Impression:       Satisfactory placement of nasogastric tube  This report was finalized on 07/12/2017 07:13 by Dr. Adolph Echols MD.          Medication Review:     Current Facility-Administered Medications   Medication Dose Route Frequency Provider Last Rate Last Dose   • acetaminophen (TYLENOL) tablet 650 mg  650 mg Oral Q4H PRN Jairon Perez, DO   650 mg at 07/09/17 1003   • albuterol (PROVENTIL) nebulizer solution 0.083% 2.5 mg/3mL  2.5 mg Nebulization Q6H - RT Jairon Perez, DO   2.5 mg at 07/12/17 0632   • amLODIPine (NORVASC) tablet 5 mg  5 mg Per G Tube Q24H Cristino Encinas DO   5 mg at 07/11/17 0809   • chlorhexidine (PERIDEX) 0.12 % solution 15 mL  15 mL Mouth/Throat Q12H Cristino Encinas DO   15 mL at 07/11/17 2243   • CloNIDine (CATAPRES-TTS) 0.3 MG/24HR patch 1 patch  1 patch Transdermal Weekly Cristino Encinas DO   1 patch at 07/11/17 1423   • dexamethasone sodium phosphate 6 mg in sodium chloride 0.9 % IVPB  6 mg Intravenous Q6H Cristino Encinas DO 0 mL/hr at 07/09/17 1245 6 mg at 07/12/17 0605   • dextromethorphan polistirex ER (DELSYM) 30 MG/5ML oral suspension 30 mg  30 mg Oral BID oRbe Dorsey, DO   30 mg at 07/11/17 1827   • enoxaparin (LOVENOX) syringe 40 mg  40 mg Subcutaneous Daily Cristino Encinas DO   40 mg at 07/11/17 0809   • famotidine (PEPCID) tablet 20 mg  20 mg Per G Tube Q12H Cristino Encinas DO   20 mg at 07/11/17 2242   • HYDROcodone-acetaminophen (NORCO)  MG per tablet 1 tablet  1 tablet Oral Q4H PRN Jairon Perez, DO   1 tablet at 07/03/17 1758   • HYDROcodone-acetaminophen (NORCO) 5-325 MG per tablet 1 tablet  1 tablet Oral Q4H PRN Jairon Perez DO       • HYDROmorphone (DILAUDID) injection 1 mg  1 mg Intravenous Q3H PRN Jairon Perez DO   1 mg at 07/10/17 1354    And   • naloxone (NARCAN) injection 0.4 mg  0.4 mg  Intravenous Q5 Min PRN Jairon Perez, DO       • ibuprofen (ADVIL,MOTRIN) 100 MG/5ML suspension 200 mg  200 mg Oral Q6H PRN Cristino Encinas, DO   200 mg at 07/09/17 1342   • levETIRAcetam in NaCl 0.75% (KEPPRA) IVPB 1,000 mg  1,000 mg Intravenous Q12H Royal Campos MD 0 mL/hr at 07/09/17 0930 1,000 mg at 07/11/17 2243   • levoFLOXacin (LEVAQUIN) 750 mg/150 mL D5W (premix) (LEVAQUIN) 750 mg  750 mg Intravenous Q24H Cristino Encinas, DO   750 mg at 07/11/17 1126   • LORazepam (ATIVAN) tablet 1 mg  1 mg Oral Q2H PRN Cristino Encinas, DO        Or   • LORazepam (ATIVAN) injection 1 mg  1 mg Intravenous Q2H PRN Cristino Encinas, DO        Or   • LORazepam (ATIVAN) tablet 2 mg  2 mg Oral Q1H PRN Cristino Encinas DO        Or   • LORazepam (ATIVAN) injection 2 mg  2 mg Intravenous Q1H PRN Cristino Encinas DO   2 mg at 07/09/17 0336    Or   • LORazepam (ATIVAN) injection 2 mg  2 mg Intravenous Q15 Min PRN Cristino Encinas DO        Or   • LORazepam (ATIVAN) injection 2 mg  2 mg Intramuscular Q15 Min PRN Cristino Encinas DO        Or   • LORazepam (ATIVAN) tablet 4 mg  4 mg Oral Q1H PRN Cristino Encinas DO        Or   • LORazepam (ATIVAN) injection 4 mg  4 mg Intravenous Q1H PRN Cristino Encinas, DO   4 mg at 07/11/17 1145   • metoprolol tartrate (LOPRESSOR) tablet 100 mg  100 mg Nasogastric Q12H Cristino Encinas DO       • metroNIDAZOLE (FLAGYL) tablet 500 mg  500 mg Per G Tube Q8H Cristino Encinas, DO   500 mg at 07/12/17 0700   • niCARdipine (CARDENE) 25 mg/250 mL (0.1 mg/mL) 0.9% NS infusion  5-15 mg/hr Intravenous Titrated Jairon Perez, DO 50 mL/hr at 07/10/17 0448 5 mg/hr at 07/10/17 0448   • ondansetron (ZOFRAN) injection 4 mg  4 mg Intravenous Q6H PRN Jairon Perez DO   4 mg at 07/09/17 1715   • Pharmacy to dose vancomycin   Does not apply Continuous PRN Cristino Encinas DO       • piperacillin-tazobactam (ZOSYN) 3.375 g in iso-osmotic dextrose 50 ml (premix)  3.375 g  Intravenous Q6H Cristino Encinas, DO 0 mL/hr at 07/12/17 0200 3.375 g at 07/12/17 0603   • propofol (DIPRIVAN) infusion 10 mg/mL 100 mL  5-50 mcg/kg/min Intravenous Titrated Jairon Perez, DO 37.3 mL/hr at 07/12/17 0609 75 mcg/kg/min at 07/12/17 0609   • Scopolamine (TRANSDERM-SCOP) 1.5 MG/3DAYS patch 1 patch  1 patch Transdermal Q72H Erwin Glass MD   1 patch at 07/12/17 0253   • sodium chloride 0.9 % flush 1-10 mL  1-10 mL Intravenous PRN Jairon Perez DO       • terazosin (HYTRIN) capsule 5 mg  5 mg Per G Tube Nightly Cristino Encinas, DO   5 mg at 07/11/17 2242   • thiamine (B-1) 100 mg in sodium chloride 0.9 % 100 mL IVPB  100 mg Intravenous Daily Jairon Perez  mL/hr at 07/11/17 0809 100 mg at 07/11/17 0809   • vancomycin 1250 mg/250 mL 0.9% NS IVPB ([facility])  1,250 mg Intravenous Q12H Cristino Encinas, DO 0 mL/hr at 07/09/17 1000 1,250 mg at 07/11/17 2243       Assessment/Plan     Active Problems:    Angio-edema    Seizures    HCAP (healthcare-associated pneumonia)      We will proceed with tracheostomy.  We will see with a OR will allow this.  Discussed risks, benefits, alternatives, and potential, locations with the sister and mother.            Jairon Pierson MD  07/12/17  8:37 AM

## 2017-07-12 NOTE — PROGRESS NOTES
PULMONARY AND CRITICAL CARE PROGRESS NOTE - [facility]    Patient: Nishant Savage    1965    MR# [id number]    Acct# [id number]  07/12/17   9:55 AM  Referring Provider: Cristino Encinas DO    Chief Complaint: Mechanically ventilated    Interval history: Remains on ventilator with propofol gtt infusing. He will open his eyes and follow some commands despite high rate of propofol. Tongue remains bruised and edematous protruding from mouth around ETT. ABG's and CXR stable (with continued atelectasis). No overnight events other than ongoing issues with hypertension.   No other aggravating or allevitating factors.       Meds:    albuterol 2.5 mg Nebulization Q6H - RT   amLODIPine 5 mg Per G Tube Q24H   chlorhexidine 15 mL Mouth/Throat Q12H   CloNIDine 1 patch Transdermal Weekly   dexamethasone 6 mg Intravenous Q6H   dextromethorphan polistirex ER 30 mg Oral BID   enoxaparin 40 mg Subcutaneous Daily   famotidine 20 mg Per G Tube Q12H   levETIRAcetam 1,000 mg Intravenous Q12H   levoFLOXacin 750 mg Intravenous Q24H   metoprolol tartrate 100 mg Nasogastric Q12H   metroNIDAZOLE 500 mg Per G Tube Q8H   piperacillin-tazobactam 3.375 g Intravenous Q6H   Scopolamine 1 patch Transdermal Q72H   terazosin 5 mg Per G Tube Nightly   thiamine (VITAMIN B1) IVPB 100 mg Intravenous Daily   vancomycin 1,250 mg Intravenous Q12H       niCARdipine 5-15 mg/hr Last Rate: 5 mg/hr (07/10/17 0448)   Pharmacy to dose vancomycin     propofol 5-50 mcg/kg/min Last Rate: 75 mcg/kg/min (07/12/17 0428)     Review of Systems:   Cannot obtain due to mechanical ventilation.  The patient notably is critically ill and connected to a ventilator.  As such patient cannot communicate and provide any history whatsoever, including any history of present illness or interval history since arrival or review of systems. The interested reviewer may note this fact, as an attempt has been made at collecting and documenting these portions of the  patient history, but this information is unobtainable despite attempted review and therefore cannot be documented at this time.     Ventilator Settings:  Vent Mode: VC/AC  Vt (Set, L): 0.65 L  Resp Rate (Set): 10  Pressure Support (cm H2O): 0 cm H20  FiO2 (%): 40 %  PEEP/CPAP (cm H2O): 10 cm H20  Minute Ventilation (L/min) (Obs): 10.9 L/min  Resp Rate (Observed) Vent: 18  I:E Ratio (Set): 1:1.60  I:E Ratio (Obs): 1:2.5  PIP Observed (cm H2O): 37 cm H2O  RSBI: 202    Physical Exam:  Temp:  [97.7 °F (36.5 °C)-99.4 °F (37.4 °C)] 99.4 °F (37.4 °C)  Heart Rate:  [47-80] 57  Resp:  [13-23] 17  BP: (154-182)/() 176/100  FiO2 (%):  [40 %] 40 %    Intake/Output Summary (Last 24 hours) at 07/12/17 0955  Last data filed at 07/12/17 0933   Gross per 24 hour   Intake          3534.71 ml   Output             5825 ml   Net         -2290.29 ml     SpO2 Readings from Last 3 Encounters:   07/12/17 98%   09/30/16 98%   02/04/16 96%     Physical Exam   Gen: Sedated and intubated on mechanical ventilatory support.  HEENT: Endotracheal tube is in place. Tongue is swollen and bruised. Oral secretions are present.   Neck: Supple.  Respiratory: Fair air movement bilaterally with a few scattered rhonchi.   Cardiac: bradycardic rhythm in the 50's. No murmur or gallop noted.   Abdomen: distended, soft, bowel sounds present.   Neurologic: He has been opening eyes and following commands per nursing. He is sedated.   Psychiatric: Cannot assess as he is sedated and intubated.  Dermatologic: No rash noted.  Extremities: +generalized edema, SCDs are in place.  Musculoskeletal: No joint deformities noted.  Lymphatic: No adenopathy palpated    Results from last 7 days  Lab Units 07/12/17  0226 07/08/17  1057 07/06/17  0222   WBC 10*3/mm3 16.64* 12.30* 9.27   HEMOGLOBIN g/dL 10.4* 10.4* 12.1*   PLATELETS 10*3/mm3 349 199 216       Results from last 7 days  Lab Units 07/12/17  0226 07/11/17  0142 07/10/17  0338   SODIUM mmol/L 147* 147* 144      POTASSIUM mmol/L 3.3* 3.7 4.1   BUN mg/dL 29* 21 13   CREATININE mg/dL 1.06 1.07 0.98       Results from last 7 days  Lab Units 07/12/17  0310 07/11/17  0327 07/10/17  0255   PH, ARTERIAL pH units 7.498* 7.495* 7.443   PCO2, ARTERIAL mm Hg 36.6 31.1* 32.9*   PO2 ART mm Hg 76.8* 76.1* 55.9*   FIO2 % 40 40 30        Recent films:  Imaging Results (last 24 hours)     Procedure Component Value Units Date/Time    XR Chest 1 View [[id number]] Collected:  07/12/17 0705     Updated:  07/12/17 0710    Narrative:       EXAMINATION:   XR CHEST 1 VW-  7/12/2017 7:05 AM CDT     HISTORY: Respiratory failure     Single view the chest obtained. Left lung is clear. Mild right  infrahilar atelectasis is present. Left diaphragms indistinct consistent  with atelectasis left lower lobe. Infiltrate tube nasogastric tube are  present. Left subclavian catheter is present.     IMPRESSION  1. Atelectasis left lower lobe.        2. Mild atelectasis present in the right infrahilar region.  This report was finalized on 07/12/2017 07:07 by Dr. Adolph Echols MD.    XR Abdomen KUB [[id number]] Collected:  07/12/17 0713     Updated:  07/12/17 0716    Narrative:       EXAMINATION:   XR ABDOMEN KUB-  7/12/2017 7:13 AM CDT     HISTORY: NG tube     Single view the abdomen is obtained. Nasogastric tube is present  satisfactorily positioned in the fundus the stomach.       Impression:       Satisfactory placement of nasogastric tube  This report was finalized on 07/12/2017 07:13 by Dr. Adolph Echols MD.        Films reviewed personally by me.  My interpretation: LLL atelectasis     Pulmonary Assessment:  1. Respiratory failure related to agitation from ethanol withdrawal.  2. Malignant HTN   3. PRES   4. Recent problems with angioedema which are improving   5. History of chronic ethanol use  6. C-diff   7. Tongue hematoma  8. MSSA respiratory culture       Recommend:   · Continue current mechanical ventilation.  Not candidate for weaning trials 2' to  neuro status, hypertension, and tongue swelling.    · Plans for tracheostomy placement later today   · Peep decreased from 10 to 8    Electronically signed by EPI Lincoln on 7/12/2017 at 9:55 AM  Physician substantive contribution:  Pertinent symptoms/interval history include: The patient remains on the ventilator with plans for tracheostomy later today.  Respiratory exam shows pertinent findings of minimal rhonchi on chest exam  Plan includes: Agree with proceeding with tracheostomy.  I have seen and examined patient personally, performing a face-to-face diagnostic evaluation with plan of care reviewed and developed with APRN and nursing staff. I have addended and/or modified the above history of present illness, physical examination, and assessment and plan to reflect my findings and impressions. Essential elements of the care plan were discussed with APRN above.  Agree with findings and assessment/plan as documented above.    Electronically signed by Indra Gallo MD, on 7/12/2017, 11:59 AM

## 2017-07-12 NOTE — OP NOTE
Preprocedure diagnosis: Angioedema     Post procedure diagnosis: Angioedema    Procedure performed: 1) Tracheostomy  2) tracheoscopy     Surgeon: Jairon Pierson M.D.     Anesthesia: General     IV fluids: Per anesthesia     Estimated blood loss: Scant     Drains: None     Disposition: PACU     Complications: None     Specimens: None     Details:   the patient was transferred to the operating room table. A shoulder roll was placed. I infiltrated 4 mL 1% lidocaine with 1-100,000 epinephrine into the cervical skin after cleansing with alcohol. The patient was sterilely prepped and draped.     A 15 blade used to make an incision approximately 2 cm above the suprasternal notch. Superior and inferior's skin flaps were elevated. I then performed a midline lipectomy by grasping the fat using Allis clamps and trouble getting this using cautery set at 20. I then dissected through the linea alba of the strap musculature and identified the cricoid and the thyroid isthmus. I suture ligated the thyroid isthmus using tonsil clamps to clamp each end of the isthmus, incise the isthmus using cautery set at 20, and placed 3-0 Vicryl suture ligatures.      The anesthetist then deflated the cuff on the endotracheal tube. I made an incision under the second tracheal ring using a 15 blade and made a small inferiorly based Barb flap and secured this to the inferior skin with a 3-0 chromic. The endotracheal tube was withdrawn above the stoma and a 6 DCT Shiley was then placed, the inner cannula placed, this was connected to the anesthesia circuit. O2 saturation and end tidal CO2 was maintained. I then secured the trachostomy tube to the cervical skin using 4-0 silk sutures. A trach tie was placed and a dressing was placed    The anesthetist reported pressures in the 40.  As a result of performed bedside tracheoscopy through the tracheostomy to be tube.  I used the flexible fiberoptic scope and passes through the inner cannula.  The  tracheostomy tube was in excellent position.  The jolly was healthy.  There is moderate amount of mucus superiorly but not obstructive.  The scope was withdrawn and patient was placed back on the circuit.  This point the anesthetist reported that the peak pressures were in the 24  range.

## 2017-07-13 ENCOUNTER — APPOINTMENT (OUTPATIENT)
Dept: GENERAL RADIOLOGY | Facility: HOSPITAL | Age: 52
End: 2017-07-13

## 2017-07-13 LAB
ABO + RH BLD: NORMAL
ABO + RH BLD: NORMAL
ARTERIAL PATENCY WRIST A: ABNORMAL
ATMOSPHERIC PRESS: ABNORMAL MMHG
BASE EXCESS BLDA CALC-SCNC: 1.8 MMOL/L (ref -2–2)
BDY SITE: ABNORMAL
BH BB BLOOD EXPIRATION DATE: NORMAL
BH BB BLOOD EXPIRATION DATE: NORMAL
BH BB BLOOD TYPE BARCODE: 5100
BH BB BLOOD TYPE BARCODE: 5100
BH BB DISPENSE STATUS: NORMAL
BH BB DISPENSE STATUS: NORMAL
BH BB PRODUCT CODE: NORMAL
BH BB PRODUCT CODE: NORMAL
BH BB UNIT NUMBER: NORMAL
BH BB UNIT NUMBER: NORMAL
HCO3 BLDA-SCNC: 25.8 MMOL/L (ref 22–26)
HOROWITZ INDEX BLD+IHG-RTO: 40 %
MODALITY: ABNORMAL
PCO2 BLDA: 38.8 MM HG (ref 35–45)
PEEP RESPIRATORY: 10 CM[H2O]
PH BLDA: 7.44 PH UNITS (ref 7.35–7.45)
PO2 BLDA: 105.3 MM HG (ref 80–100)
SAO2 % BLDCOA: 98 % (ref 94–100)
SAO2 % BLDCOA: 98 % (ref 94–100)
TOTAL RATE: 10 BREATHS/MINUTE
UNIT  ABO: NORMAL
UNIT  ABO: NORMAL
UNIT  RH: NORMAL
UNIT  RH: NORMAL
VENT CPAP/PEEP: 10
VENTILATOR MODE: AC

## 2017-07-13 PROCEDURE — 25010000002 THIAMINE PER 100 MG: Performed by: INTERNAL MEDICINE

## 2017-07-13 PROCEDURE — 25010000002 VANCOMYCIN 10 G RECONSTITUTED SOLUTION: Performed by: FAMILY MEDICINE

## 2017-07-13 PROCEDURE — 99231 SBSQ HOSP IP/OBS SF/LOW 25: CPT | Performed by: NURSE PRACTITIONER

## 2017-07-13 PROCEDURE — 25010000002 LORAZEPAM PER 2 MG: Performed by: FAMILY MEDICINE

## 2017-07-13 PROCEDURE — 25010000002 LORAZEPAM PER 2 MG: Performed by: INTERNAL MEDICINE

## 2017-07-13 PROCEDURE — 94799 UNLISTED PULMONARY SVC/PX: CPT

## 2017-07-13 PROCEDURE — 99232 SBSQ HOSP IP/OBS MODERATE 35: CPT | Performed by: PSYCHIATRY & NEUROLOGY

## 2017-07-13 PROCEDURE — 25010000002 PROPOFOL 1000 MG/ML EMULSION: Performed by: INTERNAL MEDICINE

## 2017-07-13 PROCEDURE — 94003 VENT MGMT INPAT SUBQ DAY: CPT

## 2017-07-13 PROCEDURE — 25010000002 HYDROMORPHONE PER 4 MG: Performed by: INTERNAL MEDICINE

## 2017-07-13 PROCEDURE — 02HV33Z INSERTION OF INFUSION DEVICE INTO SUPERIOR VENA CAVA, PERCUTANEOUS APPROACH: ICD-10-PCS | Performed by: FAMILY MEDICINE

## 2017-07-13 PROCEDURE — 25010000002 DEXAMETHASONE PER 1 MG: Performed by: FAMILY MEDICINE

## 2017-07-13 PROCEDURE — 25010000002 PIPERACILLIN SOD-TAZOBACTAM PER 1 G: Performed by: FAMILY MEDICINE

## 2017-07-13 PROCEDURE — 82803 BLOOD GASES ANY COMBINATION: CPT

## 2017-07-13 PROCEDURE — 25010000003 LEVETIRACETAM IN NACL 0.75% 1000 MG/100ML SOLUTION: Performed by: PSYCHIATRY & NEUROLOGY

## 2017-07-13 PROCEDURE — 25010000002 ENOXAPARIN PER 10 MG: Performed by: FAMILY MEDICINE

## 2017-07-13 PROCEDURE — 25010000002 ZIPRASIDONE MESYLATE PER 10 MG: Performed by: FAMILY MEDICINE

## 2017-07-13 PROCEDURE — 71010 HC CHEST PA OR AP: CPT

## 2017-07-13 PROCEDURE — 36600 WITHDRAWAL OF ARTERIAL BLOOD: CPT

## 2017-07-13 PROCEDURE — C1751 CATH, INF, PER/CENT/MIDLINE: HCPCS

## 2017-07-13 PROCEDURE — 94770: CPT

## 2017-07-13 PROCEDURE — 25010000002 LEVOFLOXACIN PER 250 MG: Performed by: FAMILY MEDICINE

## 2017-07-13 PROCEDURE — 97110 THERAPEUTIC EXERCISES: CPT

## 2017-07-13 RX ORDER — ZIPRASIDONE MESYLATE 20 MG/ML
10 INJECTION, POWDER, LYOPHILIZED, FOR SOLUTION INTRAMUSCULAR EVERY 6 HOURS PRN
Status: DISCONTINUED | OUTPATIENT
Start: 2017-07-13 | End: 2017-07-16

## 2017-07-13 RX ORDER — LIDOCAINE HYDROCHLORIDE 10 MG/ML
0.5 INJECTION, SOLUTION EPIDURAL; INFILTRATION; INTRACAUDAL; PERINEURAL ONCE
Status: COMPLETED | OUTPATIENT
Start: 2017-07-13 | End: 2017-07-13

## 2017-07-13 RX ORDER — LORAZEPAM 2 MG/ML
1 INJECTION INTRAMUSCULAR EVERY 8 HOURS PRN
Status: DISCONTINUED | OUTPATIENT
Start: 2017-07-13 | End: 2017-07-19 | Stop reason: HOSPADM

## 2017-07-13 RX ORDER — LISINOPRIL 20 MG/1
20 TABLET ORAL
Status: DISCONTINUED | OUTPATIENT
Start: 2017-07-13 | End: 2017-07-13

## 2017-07-13 RX ORDER — AMLODIPINE BESYLATE 10 MG/1
10 TABLET ORAL
Status: DISCONTINUED | OUTPATIENT
Start: 2017-07-14 | End: 2017-07-19 | Stop reason: HOSPADM

## 2017-07-13 RX ADMIN — ZIPRASIDONE MESYLATE 10 MG: 20 INJECTION, POWDER, LYOPHILIZED, FOR SOLUTION INTRAMUSCULAR at 18:17

## 2017-07-13 RX ADMIN — LEVOFLOXACIN 750 MG: 5 INJECTION, SOLUTION INTRAVENOUS at 11:41

## 2017-07-13 RX ADMIN — VANCOMYCIN HYDROCHLORIDE 1250 MG: 10 INJECTION, POWDER, LYOPHILIZED, FOR SOLUTION INTRAVENOUS at 08:47

## 2017-07-13 RX ADMIN — ALBUTEROL SULFATE 2.5 MG: 2.5 SOLUTION RESPIRATORY (INHALATION) at 00:25

## 2017-07-13 RX ADMIN — TAZOBACTAM SODIUM AND PIPERACILLIN SODIUM 3.38 G: 375; 3 INJECTION, SOLUTION INTRAVENOUS at 17:18

## 2017-07-13 RX ADMIN — DEXTROMETHORPHAN 30 MG: 30 SUSPENSION, EXTENDED RELEASE ORAL at 08:47

## 2017-07-13 RX ADMIN — TAZOBACTAM SODIUM AND PIPERACILLIN SODIUM 3.38 G: 375; 3 INJECTION, SOLUTION INTRAVENOUS at 23:10

## 2017-07-13 RX ADMIN — ZIPRASIDONE MESYLATE 10 MG: 20 INJECTION, POWDER, LYOPHILIZED, FOR SOLUTION INTRAMUSCULAR at 11:41

## 2017-07-13 RX ADMIN — PROPOFOL 75 MCG/KG/MIN: 10 INJECTION, EMULSION INTRAVENOUS at 01:25

## 2017-07-13 RX ADMIN — TAZOBACTAM SODIUM AND PIPERACILLIN SODIUM 3.38 G: 375; 3 INJECTION, SOLUTION INTRAVENOUS at 11:40

## 2017-07-13 RX ADMIN — TAZOBACTAM SODIUM AND PIPERACILLIN SODIUM 3.38 G: 375; 3 INJECTION, SOLUTION INTRAVENOUS at 00:08

## 2017-07-13 RX ADMIN — LEVETIRACETAM 1000 MG: 1000 INJECTION, SOLUTION INTRAVENOUS at 20:07

## 2017-07-13 RX ADMIN — METRONIDAZOLE 500 MG: 500 TABLET, FILM COATED ORAL at 06:22

## 2017-07-13 RX ADMIN — FAMOTIDINE 20 MG: 20 TABLET, FILM COATED ORAL at 20:06

## 2017-07-13 RX ADMIN — METRONIDAZOLE 500 MG: 500 TABLET, FILM COATED ORAL at 14:31

## 2017-07-13 RX ADMIN — DEXAMETHASONE SODIUM PHOSPHATE 4 MG: 4 INJECTION, SOLUTION INTRAMUSCULAR; INTRAVENOUS at 00:08

## 2017-07-13 RX ADMIN — ENOXAPARIN SODIUM 40 MG: 40 INJECTION SUBCUTANEOUS at 08:47

## 2017-07-13 RX ADMIN — ALBUTEROL SULFATE 2.5 MG: 2.5 SOLUTION RESPIRATORY (INHALATION) at 06:40

## 2017-07-13 RX ADMIN — PROPOFOL 40 MCG/KG/MIN: 10 INJECTION, EMULSION INTRAVENOUS at 10:24

## 2017-07-13 RX ADMIN — LEVETIRACETAM 1000 MG: 1000 INJECTION, SOLUTION INTRAVENOUS at 08:43

## 2017-07-13 RX ADMIN — THIAMINE HYDROCHLORIDE 100 MG: 100 INJECTION, SOLUTION INTRAMUSCULAR; INTRAVENOUS at 08:47

## 2017-07-13 RX ADMIN — VANCOMYCIN HYDROCHLORIDE 1250 MG: 10 INJECTION, POWDER, LYOPHILIZED, FOR SOLUTION INTRAVENOUS at 20:07

## 2017-07-13 RX ADMIN — PROPOFOL 75 MCG/KG/MIN: 10 INJECTION, EMULSION INTRAVENOUS at 04:36

## 2017-07-13 RX ADMIN — TAZOBACTAM SODIUM AND PIPERACILLIN SODIUM 3.38 G: 375; 3 INJECTION, SOLUTION INTRAVENOUS at 04:11

## 2017-07-13 RX ADMIN — LORAZEPAM 4 MG: 2 INJECTION INTRAMUSCULAR at 14:57

## 2017-07-13 RX ADMIN — LORAZEPAM 1 MG: 2 INJECTION INTRAMUSCULAR; INTRAVENOUS at 23:10

## 2017-07-13 RX ADMIN — ALBUTEROL SULFATE 2.5 MG: 2.5 SOLUTION RESPIRATORY (INHALATION) at 18:29

## 2017-07-13 RX ADMIN — AMLODIPINE BESYLATE 5 MG: 5 TABLET ORAL at 08:47

## 2017-07-13 RX ADMIN — LIDOCAINE HYDROCHLORIDE 0.5 ML: 10 INJECTION, SOLUTION EPIDURAL; INFILTRATION; INTRACAUDAL; PERINEURAL at 15:37

## 2017-07-13 RX ADMIN — METRONIDAZOLE 500 MG: 500 TABLET, FILM COATED ORAL at 21:40

## 2017-07-13 RX ADMIN — CHLORHEXIDINE GLUCONATE 15 ML: 1.2 RINSE ORAL at 08:47

## 2017-07-13 RX ADMIN — FAMOTIDINE 20 MG: 20 TABLET, FILM COATED ORAL at 08:47

## 2017-07-13 RX ADMIN — PROPOFOL 20 MCG/KG/MIN: 10 INJECTION, EMULSION INTRAVENOUS at 16:01

## 2017-07-13 RX ADMIN — PROPOFOL 75 MCG/KG/MIN: 10 INJECTION, EMULSION INTRAVENOUS at 06:58

## 2017-07-13 RX ADMIN — DEXTROMETHORPHAN 30 MG: 30 SUSPENSION, EXTENDED RELEASE ORAL at 17:18

## 2017-07-13 RX ADMIN — DEXAMETHASONE SODIUM PHOSPHATE 4 MG: 4 INJECTION, SOLUTION INTRAMUSCULAR; INTRAVENOUS at 06:19

## 2017-07-13 RX ADMIN — LORAZEPAM 2 MG: 2 INJECTION INTRAMUSCULAR; INTRAVENOUS at 10:09

## 2017-07-13 RX ADMIN — CHLORHEXIDINE GLUCONATE 15 ML: 1.2 RINSE ORAL at 20:07

## 2017-07-13 RX ADMIN — HYDROMORPHONE HYDROCHLORIDE 1 MG: 1 INJECTION, SOLUTION INTRAMUSCULAR; INTRAVENOUS; SUBCUTANEOUS at 09:53

## 2017-07-13 RX ADMIN — ALBUTEROL SULFATE 2.5 MG: 2.5 SOLUTION RESPIRATORY (INHALATION) at 10:50

## 2017-07-13 RX ADMIN — TERAZOSIN HYDROCHLORIDE ANHYDROUS 10 MG: 10 CAPSULE ORAL at 20:07

## 2017-07-13 NOTE — CONSULTS
Pt had 5 [place] triple lumen PICC placed in right cephalic vein. Pt tolerated procedure well. 43 cm of catheter internal and 2 cm external. Tip confirmation by 3cg. All lumens flush and draw well. Sterile dressing applied.

## 2017-07-13 NOTE — THERAPY TREATMENT NOTE
Acute Care - Physical Therapy Treatment Note  [facility]     Patient Name: Nishant Savage  : 1965  MRN: [id number]  Today's Date: 2017  Onset of Illness/Injury or Date of Surgery Date: 17  Date of Referral to PT: 17  Referring Physician: Dr. Encinas    Admit Date: 7/3/2017    Visit Dx:    ICD-10-CM ICD-9-CM   1. Oropharyngeal dysphagia R13.12 787.22   2. Seizures R56.9 780.39   3. Angioedema, sequela T78.3XXS 909.9   4. Alcohol withdrawal, with delirium F10.231 291.0   5. Impaired mobility Z74.09 799.89     Patient Active Problem List   Diagnosis   • Hyperlipidemia   • HTN (hypertension)   • Gout   • Anxiety   • Arthritis   • Erectile dysfunction   • Angio-edema   • Seizures   • HCAP (healthcare-associated pneumonia)               Adult Rehabilitation Note       17 0823 17 0836 17 0832    Rehab Assessment/Intervention    Discipline physical therapy assistant  -SUKHDEEP physical therapy assistant  - physical therapy assistant  -    Document Type therapy note (daily note)  -SUKHDEEP therapy note (daily note)  - therapy note (daily note)  -    Subjective Information agree to therapy  -SUKHDEEP unable to respond  - unable to respond   pt's sedation off near end of tx and pt. waking up slightly  -    Precautions/Limitations oxygen therapy device and L/min   vent, restraints,c-diff  -SUKHDEEP oxygen therapy device and L/min   -vent, restraints, c-diff  -MF oxygen therapy device and L/min   vent, restraints, c-diff  -MF    Recorded by [SUKHDEEP] Britt Fuller PTA [] Uma Restrepo PTA [] Uma Restrepo, NATALIE    Vital Signs    Pretreatment Heart Rate (beats/min) 50  -SUKHDEEP      Posttreatment Heart Rate (beats/min) 55  -SUKHDEEP      Pre SpO2 (%) 96  -SUKHDEEP      Intra SpO2 (%) 99  -SUKHDEEP      Post SpO2 (%) 100  -SUKHDEEP      Recorded by [SUKHDEEP] Britt Fuller PTA      Pain Assessment    Pain Assessment No/denies pain  -SUKHDEEP Rivera-Narvaez FACES  -MF Rivera-Baker FACES  -MF    Rivera-Baker FACES Pain Rating  0   -MF 0  -MF    Recorded by [SUKHDEEP] Britt Fuller PTA [MF] Uma Restrepo PTA [MF] Uma Restrepo PTA    Therapy Exercises    Bilateral Lower Extremities PROM:;AAROM:;20 reps;supine;ankle pumps/circles;heel slides;hip abduction/adduction;hip ER;hip IR;SLR   10 SLR  -SUKHDEEP PROM:;20 reps;supine;ankle pumps/circles;calf stretch;heel slides;hip abduction/adduction;SLR;hip IR  -MF PROM:;20 reps;supine;ankle pumps/circles;calf stretch;heel slides;hip abduction/adduction;hip IR;SLR  -MF    Bilateral Upper Extremity PROM:;AAROM:;20 reps;supine;elbow flexion/extension;hand pumps;shoulder abduction/adduction;shoulder extension/flexion;shoulder ER/IR;shoulder horizontal abd/add  -SUKHDEEP PROM:;20 reps;supine;elbow flexion/extension;hand pumps;shoulder abduction/adduction;shoulder extension/flexion   while sedation paused, pt. squeezed MD's hand  - PROM:;20 reps;supine;elbow flexion/extension;hand pumps;shoulder abduction/adduction;shoulder extension/flexion  -    Recorded by [SUKHDEEP] Britt Fuller PTA [MF] Uma Restrepo PTA [MF] Uma Restrepo PTA    Positioning and Restraints    Pre-Treatment Position in bed  - in bed  - in bed  -    Post Treatment Position bed  - bed  - bed  -    In Bed fowlers;call light within reach;encouraged to call for assist;with nsg;side rails up x3;RUE elevated;LUE elevated;SCD pump applied  -SUKHDEEP fowlers;call light within reach;patient within staff view;notified nsg;side rails up x2;SCD pump applied;LUE elevated;RUE elevated;L heel elevated;R heel elevated  - fowlers;notified nsg;call light within reach;patient within staff view;side rails up x2;SCD pump applied  -    Restraints released:;reapplied:;soft limb  -SUKHDEEP released:;reapplied:;notified nsg:  -MF released:;reapplied:;notified nsg:  -MF    Recorded by [SUKHDEEP] Britt Fuller PTA [MF] Uma Restrepo, PTA [MF] Uma Restrepo, PTA      User Key  (r) = Recorded By, (t) = Taken By, (c) = Cosigned By    Initials  Name Effective Dates     Britt Fuller, PTA 08/02/16 -      Uma Restrepo, PTA 08/02/16 -                 IP PT Goals       07/10/17 0810          Bed Mobility PT LTG    Bed Mobility PT LTG, Date Established 07/10/17  -MARJORIE      Bed Mobility PT LTG, Time to Achieve by discharge  -MARJORIE      Bed Mobility PT LTG, Activity Type roll left/roll right  -MARJORIE      Bed Mobility PT LTG, Taylor Level moderate assist (50% patient effort)  -MARJORIE      Bed Mobility PT Goal  LTG, Assist Device bed rails  -MARJORIE      Strength Goal PT LTG    Strength Goal PT LTG, Date Established 07/10/17  -MARJORIE      Strength Goal PT LTG, Time to Achieve by discharge  -MARJORIE      Strength Goal PT LTG, Measure to Achieve AAROM/AROM, B UE/LE, 10-20 reps, min assist  -MARJORIE      Physical Therapy PT LTG    Physical Therapy PT LTG, Date Established 07/10/17  -MARJORIE      Physical Therapy PT LTG, Time to Achieve by discharge  -MARJORIE      Physical Therapy PT LTG, Activity Type No new evidence of skin breakdown or contractures while pt. on the vent.   -MARJORIE        User Key  (r) = Recorded By, (t) = Taken By, (c) = Cosigned By    Initials Name Provider Type    MARJORIE Olsen, PT DPT Physical Therapist          Physical Therapy Education     Title: PT OT SLP Therapies (Active)     Topic: Physical Therapy (Active)     Point: Mobility training (Active)    Learning Progress Summary    Learner Readiness Method Response Comment Documented by Status   Patient Acceptance E,D NR Benefit of activities; ex's  07/13/17 0847 Active    Nonacceptance E NL Pt. sedated on vent, no evidence of learning.  07/11/17 0905 Active               Point: Home exercise program (Active)    Learning Progress Summary    Learner Readiness Method Response Comment Documented by Status   Patient Nonacceptance E NL Pt. sedated on vent, no evidence of learning.  07/12/17 0837 Active    Nonacceptance E NL Pt. sedated on vent, no evidence of learning.  07/11/17 0905 Active    Acceptance E NR  Educated pt on progression of PT POC and benefits of activity  07/10/17 0810 Active               Point: Body mechanics (Active)    Learning Progress Summary    Learner Readiness Method Response Comment Documented by Status   Patient Nonacceptance E NL Pt. sedated on vent, no evidence of learning.  07/11/17 0905 Active               Point: Precautions (Active)    Learning Progress Summary    Learner Readiness Method Response Comment Documented by Status   Patient Nonacceptance E NL Pt. sedated on vent, no evidence of learning.  07/11/17 0905 Active                      User Key     Initials Effective Dates Name Provider Type Discipline    MARJORIE 08/02/16 -  Jaime Olsen, PT DPT Physical Therapist PT     08/02/16 -  Britt Fuller, PTA Physical Therapy Assistant PT     08/02/16 -  Uma Restrepo PTA Physical Therapy Assistant PT                    PT Recommendation and Plan  Anticipated Discharge Disposition:  (unknown while on the vent)  Planned Therapy Interventions: (S) bed mobility training, balance training, home exercise program, patient/family education, ROM (Range of Motion), strengthening (PT will re-eval to update POC/goals once extubated. )  PT Frequency: daily, 2 times/day  Plan of Care Review  Plan Of Care Reviewed With: patient  Progress: progress toward functional goals is gradual  Outcome Summary/Follow up Plan: Pt. participated approx 25% of time with cues. Exercises performed passive to active assist.  Tolerated well. Did resist at times. Will benefit from further strengthening.          Outcome Measures       07/13/17 0800 07/12/17 0836 07/11/17 0832    How much help from another person do you currently need...    Turning from your back to your side while in flat bed without using bedrails? 1  -SUKHDEEP 1  -MF 1  -MF    Moving from lying on back to sitting on the side of a flat bed without bedrails? 1  -SUKHDEEP 1  -MF 1  -MF    Moving to and from a bed to a chair (including a wheelchair)? 1   -SUKHDEEP 1  -MF 1  -MF    Standing up from a chair using your arms (e.g., wheelchair, bedside chair)? 1  -SUKHDEEP 1  -MF 1  -MF    Climbing 3-5 steps with a railing? 1  -SUKHDEEP 1  -MF 1  -MF    To walk in hospital room? 1  -SUKHDEEP 1  -MF 1  -MF    AM-PAC 6 Clicks Score 6  -SUKHDEEP 6  -MF 6  -MF    Functional Assessment    Outcome Measure Options  AM-PAC 6 Clicks Basic Mobility (PT)  -MF AM-PAC 6 Clicks Basic Mobility (PT)  -MF      User Key  (r) = Recorded By, (t) = Taken By, (c) = Cosigned By    Initials Name Provider Type    SUKHDEEP Fuller, NATALIE Physical Therapy Assistant    MULU Restrepo [facility] Physical Therapy Assistant           Time Calculation:           PT G-Codes  Outcome Measure Options: AM-PAC 6 Clicks Basic Mobility (PT)  Score: 6  Functional Limitation: Mobility: Walking and moving around  Mobility: Walking and Moving Around Current Status (): 100 percent impaired, limited or restricted  Mobility: Walking and Moving Around Goal Status (): At least 80 percent but less than 100 percent impaired, limited or restricted    Britt Fuller, PTA  7/13/2017

## 2017-07-13 NOTE — PROGRESS NOTES
[facility] Medicine Services  INPATIENT PROGRESS NOTE    Length of Stay: 9  Date of Admission: 7/3/2017  Primary Care Physician: Linda Hoffman, DNP, APRN    Subjective     Chief Complaint:     Patient is intubated and sedated    HPI     The patient continues to have agitation and requires intermittent sedation but is off propofol drip.  He is able to follow some commands. Long discussion with family again about condition and LTAC inquiry.  Renal artery ultrasound shows no evidence of stenosis.  Recurrent diarrhea is noted.      Review of Systems     All pertinent negatives and positives are as above. All other systems have been reviewed and are negative unless otherwise stated.     Objective    Temp:  [97.7 °F (36.5 °C)] 97.7 °F (36.5 °C)  Heart Rate:  [42-77] 54  Resp:  [13] 13  BP: (135-185)/() 185/102  FiO2 (%):  [40 %] 40 %    Lab Results (last 24 hours)     Procedure Component Value Units Date/Time    Blood Gas, Arterial [[id number]]  (Abnormal) Collected:  07/13/17 0317    Specimen:  Arterial Blood Updated:  07/13/17 0320     Site Arterial: right radial     Gera's Test --      Documented in Rapid Comm        pH, Arterial 7.441 pH units      pCO2, Arterial 38.8 mm Hg      pO2, Arterial 105.3 (H) mm Hg      HCO3, Arterial 25.8 mmol/L      Base Excess, Arterial 1.8 mmol/L      O2 Saturation, Arterial 98.0 %      O2 Saturation Calculated 98.0 %      Barometric Pressure for Blood Gas -- mmHg       Component not reported at this site.        Modality Ventilator     FIO2 40 %      Ventilator Mode AC     Rate 10.0 Breaths/minute      PEEP 10.0     Vent CPAP/PEEP 10.0    Narrative:       Serial Number: [id number]    : [id number]          Imaging Results (last 24 hours)     Procedure Component Value Units Date/Time    US Renal Bilateral [[id number]] Updated:  07/12/17 1949    US Renal Artery Complete [[id number]] Collected:  07/12/17 2008     Updated:  07/12/17 2016     Narrative:       EXAMINATION: US RENAL ARTERY COMPLETE- 7/12/2017 8:08 PM CDT     HISTORY: Uncontrolled hypertension.     REPORT: Sonographic images of the kidneys were obtained, examination  includes Doppler analysis of the renal arteries, using color, gray scale  and spectral Doppler techniques were duplex study. There are no  comparison studies.     The right kidney measures 10.3 x 5.5 x 4.9 cm and has normal morphology  and echogenicity. There is no hydronephrosis. The tail flow velocities  are provided on the ultrasound worksheet, however the origin the right  renal artery is obscured. The mid right renal artery, the peak systolic  velocity measures 116.8 cm/s, this corresponds with the velocity ratio  compared to the abdominal aorta of 1.72. The resistive index measures  0.55. Velocities in the distal right renal arteries are normal.     Left kidney measures 11.5 x 6.7 x 5.3 cm and has normal morphology and  echogenicity, there is no hydronephrosis on the left. Doppler images are  limited for the left kidney, both the origin and left mid renal artery  are obscured. The PSV at the distal left renal artery measures 99.2 cm/s  with a velocity ratio compared to the abdominal aorta of 1.46 and  resistive index of 0.60. The bladder appears unremarkable.       Impression:       1. Normal morphological appearance of both kidneys.  2. Limited study with obscuration of the renal artery origins as well as  the mid left renal artery, no Doppler evidence for significant renal  artery stenosis is seen however. If more detailed evaluation is  indicated clinically, CTA would be the best study.  This report was finalized on 07/12/2017 20:13 by Dr. Marvin Mar MD.    XR Chest 1 View [[id number]] Collected:  07/13/17 0705     Updated:  07/13/17 0709    Narrative:       EXAMINATION: XR CHEST 1 VW-. 7/13/2017 7:05 AM CDT     CHEST, ONE VIEW:     HISTORY: Respiratory failure     COMPARISON: 07/12/2017     A single frontal chest  radiograph was obtained.     FINDINGS:     Tracheostomy tube and NG tube identified. Left-sided central venous  catheter observed.     Patchy lower lobe airspace opacities again noted with diminished lung  volumes with shallow inspiration likely stable.                                     Impression:       1. Stable one-day appearance the chest.     This report was finalized on 07/13/2017 07:06 by Dr. Pierre Pan MD.             Intake/Output Summary (Last 24 hours) at 07/13/17 1045  Last data filed at 07/13/17 0728   Gross per 24 hour   Intake           2447.7 ml   Output             3525 ml   Net          -1077.3 ml       Physical Exam    Constitutional: No distress.   Seen and discussed with his nurse, Brittani. Family present.  Propofol has been weaned and discontinued.  Trach in place.  HENT:   Head: Normocephalic and atraumatic.   Eyes: Pupils are equal, round, and reactive to light. The patient's tongue edema is improved.  He is able to close his mouth and retract this time without difficulty  Neck: Neck supple. No JVD present.   Cardiovascular: Normal rate and regular rhythm. Diffuse edema is noted in both the upper and lower extremities.  Pulmonary/Chest: Effort normal and breath sounds normal.   Ventilated.    Abdominal: Soft. Bowel sounds are normal.   Musculoskeletal: He exhibits 1/4 edema of lower extremities.    Neurological:  Patient is more responsive and will follow commands such as moving feet and hands.  He mouths answers to questions when asked.      Results Review:  I have reviewed the labs, radiology results, and diagnostic studies since my last progress note and made treatment changes reflective of the results.   I have reviewed the current medications.    Assessment/Plan     Hospital Problem List     Angio-edema    Seizures    HCAP (healthcare-associated pneumonia)      1. Possible angioedema related to lisinopril.  2. Malignant hypertension.  3. Possible new onset seizure disorder versus  convulsive syncope.  4. Posterior reversible encephalopathy syndrome on MRI.  5. Tongue bite with sublingual hematoma.  6. Tobacco abuse.  7. Obstructive sleep apnea, noncompliant with device.  8. Gouty arthritis.  9. Daily alcohol use with Delirium tremens requiring sedation and mechanical ventilation.  10. Hypokalemia.   11. C. Difficile colitilis  12. Lingual or sublingual infection, MSSA  13. Anasarca, improved  14.  Recurrent diarrhea    PLAN:  Discontinue IV Decadron  Geodon 10 mg IM every 6 hours when necessary agitation  CBC, BMP in a.m.  LTAC inquiry.  Add lisinopril 20 mg daily to antihypertensive regimen  Restart fecal management system  Discontinue central line in favor of PICC  Increase amlodipine to 10 mg daily    Cristino Encinas DO   07/13/17   10:45 AM  Approximately 40 minutes of critical care time were spent managing the patient exclusive of billable procedures.

## 2017-07-13 NOTE — PLAN OF CARE
Problem: Patient Care Overview (Adult)  Goal: Plan of Care Review  Outcome: Ongoing (interventions implemented as appropriate)    07/13/17 1307   Coping/Psychosocial Response Interventions   Plan Of Care Reviewed With other (see comments)  (nursing)   Patient Care Overview   Progress progress toward functional goals is gradual   Outcome Evaluation   Outcome Summary/Follow up Plan TF Formula change to Vital AF 1.2 50 ml/hr conitnuous, auto flush of 25 ml/hr. per pump. Cont to follow         Problem: Nutrition, Enteral (Adult)  Goal: Signs and Symptoms of Listed Potential Problems Will be Absent or Manageable (Nutrition, Enteral)  Outcome: Ongoing (interventions implemented as appropriate)

## 2017-07-13 NOTE — PLAN OF CARE
Problem: Patient Care Overview (Adult)  Goal: Plan of Care Review  Outcome: Ongoing (interventions implemented as appropriate)    07/13/17 0848   Coping/Psychosocial Response Interventions   Plan Of Care Reviewed With patient   Patient Care Overview   Progress progress toward functional goals is gradual   Outcome Evaluation   Outcome Summary/Follow up Plan Pt. participated approx 25% of time with cues. Exercises performed passive to active assist. Tolerated well. Did resist at times. Will benefit from further strengthening.

## 2017-07-13 NOTE — PROGRESS NOTES
Neurology Progress Note      Chief Complaint:  Seizure, Malignant HTN, PRES, EtOH w/d, Respiratory failure on Vent    Subjective     Subjective:    Trach performed.  Patient is tolerating less sedation now.  He is awake and following commands.      Medications:  Current Facility-Administered Medications   Medication Dose Route Frequency Provider Last Rate Last Dose   • acetaminophen (TYLENOL) tablet 650 mg  650 mg Oral Q4H PRN Jairon Perez, DO   650 mg at 07/09/17 1003   • albuterol (PROVENTIL) nebulizer solution 0.083% 2.5 mg/3mL  2.5 mg Nebulization Q6H - RT Jairon Perez, DO   2.5 mg at 07/13/17 0640   • amLODIPine (NORVASC) tablet 5 mg  5 mg Per G Tube Q24H Cristino Encinas DO   5 mg at 07/13/17 0847   • chlorhexidine (PERIDEX) 0.12 % solution 15 mL  15 mL Mouth/Throat Q12H Cristino Encinas, DO   15 mL at 07/13/17 0847   • CloNIDine (CATAPRES-TTS) 0.3 MG/24HR patch 1 patch  1 patch Transdermal Weekly Cristino Encinas DO   1 patch at 07/11/17 1423   • dexamethasone (DECADRON) injection 4 mg  4 mg Intravenous Q6H Cristino Encinas DO   4 mg at 07/13/17 0619   • dextromethorphan polistirex ER (DELSYM) 30 MG/5ML oral suspension 30 mg  30 mg Oral BID Robe Dorsey, DO   30 mg at 07/13/17 0847   • enoxaparin (LOVENOX) syringe 40 mg  40 mg Subcutaneous Daily Cristino Encinas DO   40 mg at 07/13/17 0847   • famotidine (PEPCID) tablet 20 mg  20 mg Per G Tube Q12H Cristino Encinas DO   20 mg at 07/13/17 0847   • HYDROcodone-acetaminophen (NORCO)  MG per tablet 1 tablet  1 tablet Oral Q4H PRN Cristino Encinas DO   1 tablet at 07/03/17 1758   • HYDROcodone-acetaminophen (NORCO) 5-325 MG per tablet 1 tablet  1 tablet Oral Q4H PRN Cristino Encinas DO       • HYDROmorphone (DILAUDID) injection 1 mg  1 mg Intravenous Q3H PRN Jairon Perez,    1 mg at 07/12/17 1455    And   • naloxone (NARCAN) injection 0.4 mg  0.4 mg Intravenous Q5 Min PRN Jairon Perez DO       • ibuprofen  (ADVIL,MOTRIN) 100 MG/5ML suspension 200 mg  200 mg Oral Q6H PRN Cristino Encinas, DO   200 mg at 07/09/17 1342   • levETIRAcetam in NaCl 0.75% (KEPPRA) IVPB 1,000 mg  1,000 mg Intravenous Q12H Royal Campos MD 0 mL/hr at 07/09/17 0930 1,000 mg at 07/13/17 0843   • levoFLOXacin (LEVAQUIN) 750 mg/150 mL D5W (premix) (LEVAQUIN) 750 mg  750 mg Intravenous Q24H Cristino Encinas, DO   750 mg at 07/12/17 1129   • LORazepam (ATIVAN) tablet 1 mg  1 mg Oral Q2H PRN Cristino Encinas, DO        Or   • LORazepam (ATIVAN) injection 1 mg  1 mg Intravenous Q2H PRN Cristino Encinas, DO        Or   • LORazepam (ATIVAN) tablet 2 mg  2 mg Oral Q1H PRN Cristino Encinas, DO        Or   • LORazepam (ATIVAN) injection 2 mg  2 mg Intravenous Q1H PRN Cristino Encinas, DO   2 mg at 07/09/17 0336    Or   • LORazepam (ATIVAN) injection 2 mg  2 mg Intravenous Q15 Min PRN Cristino Encinas DO        Or   • LORazepam (ATIVAN) injection 2 mg  2 mg Intramuscular Q15 Min PRN Cristino Encinas, DO        Or   • LORazepam (ATIVAN) tablet 4 mg  4 mg Oral Q1H PRN Cristino Encinas, DO        Or   • LORazepam (ATIVAN) injection 4 mg  4 mg Intravenous Q1H PRN Cristino Encinas, DO   4 mg at 07/11/17 1145   • metoprolol tartrate (LOPRESSOR) tablet 100 mg  100 mg Nasogastric Q12H Cristino Encinas, DO       • metroNIDAZOLE (FLAGYL) tablet 500 mg  500 mg Per G Tube Q8H Cristino Encinas, DO   500 mg at 07/13/17 0622   • niCARdipine (CARDENE) 25 mg/250 mL (0.1 mg/mL) 0.9% NS infusion  5-15 mg/hr Intravenous Titrated Jairon Perez, DO 50 mL/hr at 07/10/17 0448 5 mg/hr at 07/10/17 0448   • ondansetron (ZOFRAN) injection 4 mg  4 mg Intravenous Q6H PRN Jairon Perez, DO   4 mg at 07/09/17 1715   • Pharmacy to dose vancomycin   Does not apply Continuous PRN rCistino Encinas DO       • piperacillin-tazobactam (ZOSYN) 3.375 g in iso-osmotic dextrose 50 ml (premix)  3.375 g Intravenous Q6H Cristino Encinas DO 0 mL/hr at 07/12/17 0200  3.375 g at 07/13/17 0411   • propofol (DIPRIVAN) infusion 10 mg/mL 100 mL  5-50 mcg/kg/min Intravenous Titrated Jairon Perez DO 27.3 mL/hr at 07/13/17 0846 55 mcg/kg/min at 07/13/17 0846   • Scopolamine (TRANSDERM-SCOP) 1.5 MG/3DAYS patch 1 patch  1 patch Transdermal Q72H Erwin Glass MD   1 patch at 07/12/17 0253   • sodium chloride 0.9 % flush 1-10 mL  1-10 mL Intravenous PRN Jairon Perez DO       • terazosin (HYTRIN) capsule 10 mg  10 mg Per G Tube Nightly Cristino Encinas DO   10 mg at 07/12/17 2009   • thiamine (B-1) 100 mg in sodium chloride 0.9 % 100 mL IVPB  100 mg Intravenous Daily Jairon Perez  mL/hr at 07/13/17 0847 100 mg at 07/13/17 0847   • vancomycin 1250 mg/250 mL 0.9% NS IVPB ([facility])  1,250 mg Intravenous Q12H Cristino Encinas DO 0 mL/hr at 07/09/17 1000 1,250 mg at 07/13/17 0847       Review of Systems:   Could not obtain      Objective      Vital Signs  Temp:  [97.7 °F (36.5 °C)] 97.7 °F (36.5 °C)  Heart Rate:  [42-77] 45  Resp:  [13] 13  BP: (135-180)/() 172/94  FiO2 (%):  [40 %] 40 %    Physical Exam:  Sedation lessened.   --Will move all extremities to command and answers questions yes and no appropriately with head nodding  NC/AT, tongue remains swollen  Sclera anicteric  Coarse BS bilaterally  RRR  Abdomen distended  4+ edema in feet and hands    Neuro:  Awakens to loud voice and pain.  Opens eyes  --Squeezes hand and wiggles toes  --Will track some with eyes  --PERRL  --Positive Blink  --Moving all extremities anti-gravity     Results Review:    I reviewed the patient's new clinical results.      Results from last 7 days  Lab Units 07/12/17  0226 07/08/17  1057   WBC 10*3/mm3 16.64* 12.30*   HEMOGLOBIN g/dL 10.4* 10.4*   HEMATOCRIT % 31.3* 31.5*   PLATELETS 10*3/mm3 349 199          Results from last 7 days  Lab Units 07/12/17  0226 07/11/17  0142 07/10/17  0338   SODIUM mmol/L 147* 147* 144   POTASSIUM mmol/L 3.3* 3.7 4.1   CHLORIDE mmol/L 106 109 111*   CO2 mmol/L  28.0 25.0 24.0   BUN mg/dL 29* 21 13   CREATININE mg/dL 1.06 1.07 0.98   CALCIUM mg/dL 9.0 8.9 8.6   BILIRUBIN mg/dL  --   --  0.5   ALK PHOS U/L  --   --  155*   ALT (SGPT) U/L  --   --  42   AST (SGOT) U/L  --   --  51*   GLUCOSE mg/dL 147* 143* 182*        Lab Results   Component Value Date    MG 2.1 07/06/2017     No components found for: POCGLUC  No components found for: A1C  Lab Results   Component Value Date    HDL 36 02/04/2016     No components found for: B12  No results found for: TSH     Labs reviewed    Assessment/Plan     Hospital Problem List    Active Problems:    Angio-edema    Seizures    HCAP (healthcare-associated pneumonia)    Impression  52 year old admitted with seizures, malignant HTN, PRES, EtOH w/d. Agitation, intubated, sedated, C.diff, low grade temps      Plan  1. PRES  --BPS remain somewhat elevated.  Primary team continues to titrate medications  --EEG showed no evidence of status. --On Keppra to 1000mg IV BID.  Will remain on this until MRI normal, recheck in 6 weeks  --No signs of CNS infection      2. DTs  --On Ativan, Propofol  --Intubated  --On Thiamine, FA  --Wean as tolerated     3. C.diff  --likely source of fevers  --On Flagyl      4. Tongue swelling: slowly improving    5.  GI:  Jevitiy 1.2 at 50/hr TF    6.  Resp:  I  --trach placed on 7/12 and tolerating well      Harpreet Arroyo MD  07/13/17  9:36 AM

## 2017-07-13 NOTE — PROGRESS NOTES
[facility] ([facility]) Progress Note:       LOS: 9 days   Patient Care Team:  Linda Hoffman, SUSHMA, APRN as PCP - General  Linda Hoffman DNP, APRN as PCP - Family Medicine    Active consulting complaint: s/p trach POD #1    Subjective     Interval History:     Status of active consulting problem: tongue swelling continues to mildly improve. No issues with trach per RN. He is ventilating well. Small amount of bleeding to dressing. No active bleeding.     History taken from: RN    Review of Systems:    Review of Systems   Reason unable to perform ROS: on vent.       Objective     Vital Signs  Temp:  [97.7 °F (36.5 °C)] 97.7 °F (36.5 °C)  Heart Rate:  [42-77] 50  Resp:  [10-17] 10  BP: (135-185)/() 185/102  FiO2 (%):  [40 %] 40 %    Physical Exam:   Physical Exam   Constitutional: He is cooperative. No distress.   In bed, getting changed by nurses   HENT:   Tongue swelling improving- able to fit within dental arch. Ecchymotic.    Neck: Neck supple.   Trach stable and secure with sutures. Bloody drainage noted to dressing- no active bleeding. Ventilating well.    Pulmonary/Chest: No respiratory distress.   Neurological: He is alert.        Results Review:       Lab Results (last 24 hours)     Procedure Component Value Units Date/Time    Blood Gas, Arterial [[id number]]  (Abnormal) Collected:  07/13/17 0317    Specimen:  Arterial Blood Updated:  07/13/17 0320     Site Arterial: right radial     Gera's Test --      Documented in Rapid Comm        pH, Arterial 7.441 pH units      pCO2, Arterial 38.8 mm Hg      pO2, Arterial 105.3 (H) mm Hg      HCO3, Arterial 25.8 mmol/L      Base Excess, Arterial 1.8 mmol/L      O2 Saturation, Arterial 98.0 %      O2 Saturation Calculated 98.0 %      Barometric Pressure for Blood Gas -- mmHg       Component not reported at this site.        Modality Ventilator     FIO2 40 %      Ventilator Mode AC     Rate 10.0 Breaths/minute      PEEP 10.0     Vent CPAP/PEEP 10.0    Narrative:        Serial Number: [id number]    : [id number]        Imaging Results (last 24 hours)     Procedure Component Value Units Date/Time    US Renal Bilateral [[id number]] Updated:  07/12/17 1949    US Renal Artery Complete [[id number]] Collected:  07/12/17 2008     Updated:  07/12/17 2016    Narrative:       EXAMINATION: US RENAL ARTERY COMPLETE- 7/12/2017 8:08 PM CDT     HISTORY: Uncontrolled hypertension.     REPORT: Sonographic images of the kidneys were obtained, examination  includes Doppler analysis of the renal arteries, using color, gray scale  and spectral Doppler techniques were duplex study. There are no  comparison studies.     The right kidney measures 10.3 x 5.5 x 4.9 cm and has normal morphology  and echogenicity. There is no hydronephrosis. The tail flow velocities  are provided on the ultrasound worksheet, however the origin the right  renal artery is obscured. The mid right renal artery, the peak systolic  velocity measures 116.8 cm/s, this corresponds with the velocity ratio  compared to the abdominal aorta of 1.72. The resistive index measures  0.55. Velocities in the distal right renal arteries are normal.     Left kidney measures 11.5 x 6.7 x 5.3 cm and has normal morphology and  echogenicity, there is no hydronephrosis on the left. Doppler images are  limited for the left kidney, both the origin and left mid renal artery  are obscured. The PSV at the distal left renal artery measures 99.2 cm/s  with a velocity ratio compared to the abdominal aorta of 1.46 and  resistive index of 0.60. The bladder appears unremarkable.       Impression:       1. Normal morphological appearance of both kidneys.  2. Limited study with obscuration of the renal artery origins as well as  the mid left renal artery, no Doppler evidence for significant renal  artery stenosis is seen however. If more detailed evaluation is  indicated clinically, CTA would be the best study.  This report was finalized on 07/12/2017 20:13 by   Marvin Mar MD.    XR Chest 1 View [[id number]] Collected:  07/13/17 0705     Updated:  07/13/17 0709    Narrative:       EXAMINATION: XR CHEST 1 VW-. 7/13/2017 7:05 AM CDT     CHEST, ONE VIEW:     HISTORY: Respiratory failure     COMPARISON: 07/12/2017     A single frontal chest radiograph was obtained.     FINDINGS:     Tracheostomy tube and NG tube identified. Left-sided central venous  catheter observed.     Patchy lower lobe airspace opacities again noted with diminished lung  volumes with shallow inspiration likely stable.                                     Impression:       1. Stable one-day appearance the chest.     This report was finalized on 07/13/2017 07:06 by Dr. Pierre Pan MD.          Medication Review:     Current Facility-Administered Medications   Medication Dose Route Frequency Provider Last Rate Last Dose   • acetaminophen (TYLENOL) tablet 650 mg  650 mg Oral Q4H PRN Jairon Perez DO   650 mg at 07/09/17 1003   • albuterol (PROVENTIL) nebulizer solution 0.083% 2.5 mg/3mL  2.5 mg Nebulization Q6H - RT Jairon Perez DO   2.5 mg at 07/13/17 1050   • amLODIPine (NORVASC) tablet 5 mg  5 mg Per G Tube Q24H Cristino Encinas DO   5 mg at 07/13/17 0847   • chlorhexidine (PERIDEX) 0.12 % solution 15 mL  15 mL Mouth/Throat Q12H Cristino Encinas DO   15 mL at 07/13/17 0847   • CloNIDine (CATAPRES-TTS) 0.3 MG/24HR patch 1 patch  1 patch Transdermal Weekly Cristino Encinas DO   1 patch at 07/11/17 1423   • dextromethorphan polistirex ER (DELSYM) 30 MG/5ML oral suspension 30 mg  30 mg Oral BID Robe Dorsey DO   30 mg at 07/13/17 0847   • enoxaparin (LOVENOX) syringe 40 mg  40 mg Subcutaneous Daily Cristino Encinas DO   40 mg at 07/13/17 0847   • famotidine (PEPCID) tablet 20 mg  20 mg Per G Tube Q12H Cristino Encinas DO   20 mg at 07/13/17 0847   • HYDROcodone-acetaminophen (NORCO)  MG per tablet 1 tablet  1 tablet Oral Q4H PRN Cristino Encinas,    1 tablet at 07/03/17  1758   • HYDROcodone-acetaminophen (NORCO) 5-325 MG per tablet 1 tablet  1 tablet Oral Q4H PRN Cristino Encinas DO       • HYDROmorphone (DILAUDID) injection 1 mg  1 mg Intravenous Q3H PRN Jairon Perez, DO   1 mg at 07/13/17 0953    And   • naloxone (NARCAN) injection 0.4 mg  0.4 mg Intravenous Q5 Min PRN Jairon Perez, DO       • ibuprofen (ADVIL,MOTRIN) 100 MG/5ML suspension 200 mg  200 mg Oral Q6H PRN Cristino Encinas, DO   200 mg at 07/09/17 1342   • levETIRAcetam in NaCl 0.75% (KEPPRA) IVPB 1,000 mg  1,000 mg Intravenous Q12H Royal Campos MD 0 mL/hr at 07/09/17 0930 1,000 mg at 07/13/17 0843   • levoFLOXacin (LEVAQUIN) 750 mg/150 mL D5W (premix) (LEVAQUIN) 750 mg  750 mg Intravenous Q24H Cristino Encinas, DO   750 mg at 07/12/17 1129   • lisinopril (PRINIVIL,ZESTRIL) tablet 20 mg  20 mg Per G Tube Q24H Cristino Encinas DO       • LORazepam (ATIVAN) tablet 1 mg  1 mg Oral Q2H PRN Cristino Encinas DO        Or   • LORazepam (ATIVAN) injection 1 mg  1 mg Intravenous Q2H PRN Cristino Encinas DO        Or   • LORazepam (ATIVAN) tablet 2 mg  2 mg Oral Q1H PRN Cristino Encinas DO        Or   • LORazepam (ATIVAN) injection 2 mg  2 mg Intravenous Q1H PRN Cristino Encinas DO   2 mg at 07/13/17 1009    Or   • LORazepam (ATIVAN) injection 2 mg  2 mg Intravenous Q15 Min PRN Cristino Encinas DO        Or   • LORazepam (ATIVAN) injection 2 mg  2 mg Intramuscular Q15 Min PRN Cristino Encinas DO        Or   • LORazepam (ATIVAN) tablet 4 mg  4 mg Oral Q1H PRN Cristino Encinas DO        Or   • LORazepam (ATIVAN) injection 4 mg  4 mg Intravenous Q1H PRN Cristino Encinas DO   4 mg at 07/11/17 1145   • metoprolol tartrate (LOPRESSOR) tablet 100 mg  100 mg Nasogastric Q12H Cristino Encinas DO       • metroNIDAZOLE (FLAGYL) tablet 500 mg  500 mg Per G Tube Q8H Cristino Encinas DO   500 mg at 07/13/17 0622   • niCARdipine (CARDENE) 25 mg/250 mL (0.1 mg/mL) 0.9% NS infusion  5-15 mg/hr  Intravenous Titrated Jairon Perez, DO 50 mL/hr at 07/10/17 0448 5 mg/hr at 07/10/17 0448   • ondansetron (ZOFRAN) injection 4 mg  4 mg Intravenous Q6H PRN Jairon Perez DO   4 mg at 07/09/17 1715   • Pharmacy to dose vancomycin   Does not apply Continuous PRN Cristino Encinas DO       • piperacillin-tazobactam (ZOSYN) 3.375 g in iso-osmotic dextrose 50 ml (premix)  3.375 g Intravenous Q6H Cristino Encinas, DO 0 mL/hr at 07/12/17 0200 3.375 g at 07/13/17 0411   • propofol (DIPRIVAN) infusion 10 mg/mL 100 mL  5-50 mcg/kg/min Intravenous Titrated Jairon Perez DO 19.87 mL/hr at 07/13/17 1024 40 mcg/kg/min at 07/13/17 1024   • sodium chloride 0.9 % flush 1-10 mL  1-10 mL Intravenous PRN Jairon Perez DO       • terazosin (HYTRIN) capsule 10 mg  10 mg Per G Tube Nightly Cristino Encinas, DO   10 mg at 07/12/17 2009   • thiamine (B-1) 100 mg in sodium chloride 0.9 % 100 mL IVPB  100 mg Intravenous Daily Jairon Perez,  mL/hr at 07/13/17 0847 100 mg at 07/13/17 0847   • vancomycin 1250 mg/250 mL 0.9% NS IVPB ([facility])  1,250 mg Intravenous Q12H Cristino Encinas, DO 0 mL/hr at 07/09/17 1000 1,250 mg at 07/13/17 0847   • ziprasidone (GEODON) injection 10 mg  10 mg Intramuscular Q6H PRN Cristino Encinas DO           Assessment/Plan     Active Problems:    Angio-edema    Seizures    HCAP (healthcare-associated pneumonia)      Routine trach care. Will D/C sutures around POD #5. Call for any problems.       Beatriz Briggs, APRN  07/13/17  11:21 AM

## 2017-07-13 NOTE — PROGRESS NOTES
Continued Stay Note   [facility]     Patient Name: Nishant Savage  MRN: [id number]  Today's Date: 7/13/2017    Admit Date: 7/3/2017          Discharge Plan       07/13/17 0910    Case Management/Social Work Plan    Plan LTAC?    Additional Comments Patient remains in ICU on vent.  Would patient benefit from LTAC?  Please order LTAC referral when appropriate.              Discharge Codes     None        Expected Discharge Date and Time     Expected Discharge Date Expected Discharge Time    Jul 7, 2017             HENRIETTA Nava

## 2017-07-13 NOTE — PROGRESS NOTES
PULMONARY AND CRITICAL CARE PROGRESS NOTE - [facility]    Patient: Nishant Savage    1965    MR# [id number]    Acct# [id number]  07/13/17   8:45 AM  Referring Provider: Cristino Encinas DO    Chief Complaint: Mechanically ventilated, s/p tracheostomy     Interval history: No post op issues reported. He's awake and very alert with propofol gtt being weaned off. Tongue remains bruised but looks much improved now that ETT is removed from mouth. ABG's and CXR stable. No other aggravating or allevitating factors.       Meds:    albuterol 2.5 mg Nebulization Q6H - RT   amLODIPine 5 mg Per G Tube Q24H   chlorhexidine 15 mL Mouth/Throat Q12H   CloNIDine 1 patch Transdermal Weekly   dexamethasone 4 mg Intravenous Q6H   dextromethorphan polistirex ER 30 mg Oral BID   enoxaparin 40 mg Subcutaneous Daily   famotidine 20 mg Per G Tube Q12H   levETIRAcetam 1,000 mg Intravenous Q12H   levoFLOXacin 750 mg Intravenous Q24H   metoprolol tartrate 100 mg Nasogastric Q12H   metroNIDAZOLE 500 mg Per G Tube Q8H   piperacillin-tazobactam 3.375 g Intravenous Q6H   Scopolamine 1 patch Transdermal Q72H   terazosin 10 mg Per G Tube Nightly   thiamine (VITAMIN B1) IVPB 100 mg Intravenous Daily   vancomycin 1,250 mg Intravenous Q12H       niCARdipine 5-15 mg/hr Last Rate: 5 mg/hr (07/10/17 0448)   Pharmacy to dose vancomycin     propofol 5-50 mcg/kg/min Last Rate: 75 mcg/kg/min (07/13/17 0658)     Review of Systems:   Cannot obtain due to mechanical ventilation.  The patient notably is critically ill and connected to a ventilator.  As such patient cannot communicate and provide any history whatsoever, including any history of present illness or interval history since arrival or review of systems. The interested reviewer may note this fact, as an attempt has been made at collecting and documenting these portions of the patient history, but this information is unobtainable despite attempted review and therefore cannot be  documented at this time.     Ventilator Settings:  Vent Mode: VC/AC  Vt (Set, L): 0.65 L  Resp Rate (Set): 10  Pressure Support (cm H2O): 0 cm H20  FiO2 (%): 40 %  PEEP/CPAP (cm H2O): 8 cm H20  Minute Ventilation (L/min) (Obs): 6.92 L/min  Resp Rate (Observed) Vent: 13  I:E Ratio (Set): 1:4.10  I:E Ratio (Obs): 1:2.4  PIP Observed (cm H2O): 51 cm H2O  RSBI: 202    Physical Exam:  Temp:  [97.7 °F (36.5 °C)] 97.7 °F (36.5 °C)  Heart Rate:  [42-77] 45  Resp:  [13] 13  BP: (135-181)/() 172/94  FiO2 (%):  [40 %] 40 %    Intake/Output Summary (Last 24 hours) at 07/13/17 0845  Last data filed at 07/13/17 0728   Gross per 24 hour   Intake           2859.7 ml   Output             3975 ml   Net          -1115.3 ml     SpO2 Readings from Last 3 Encounters:   07/13/17 97%   09/30/16 98%   02/04/16 96%     Physical Exam   Gen: Awake and alert despite some diprivan infusing.   HEENT: Endotracheal tube to trach with bloody drainage on dressing. Tongue is less swollen and bruised today, patient able to stick tongue out. Oral secretions are improved.   Neck: Supple, ETT to trach.  Respiratory: Fair air movement bilaterally with a few scattered rhonchi.   Cardiac: bradycardic rhythm in the 50's. No murmur or gallop noted.   Abdomen: distended, soft, bowel sounds present.   Neurologic: He is alert and following all commands. Sedation is being weaned off.  Psychiatric: Fairly calm, appears mildly agitated in trying to communicate.   Dermatologic: No rash noted.  Extremities: +generalized edema, SCDs are in place.  Musculoskeletal: No joint deformities noted.  Lymphatic: No adenopathy palpated    Results from last 7 days  Lab Units 07/12/17  0226 07/08/17  1057   WBC 10*3/mm3 16.64* 12.30*   HEMOGLOBIN g/dL 10.4* 10.4*   PLATELETS 10*3/mm3 349 199       Results from last 7 days  Lab Units 07/12/17  0226 07/11/17  0142 07/10/17  0338   SODIUM mmol/L 147* 147* 144   POTASSIUM mmol/L 3.3* 3.7 4.1   BUN mg/dL 29* 21 13   CREATININE  mg/dL 1.06 1.07 0.98       Results from last 7 days  Lab Units 07/13/17  0317 07/12/17  0310 07/11/17  0327   PH, ARTERIAL pH units 7.441 7.498* 7.495*   PCO2, ARTERIAL mm Hg 38.8 36.6 31.1*   PO2 ART mm Hg 105.3* 76.8* 76.1*   FIO2 % 40 40 40        Recent films:  Imaging Results (last 24 hours)     Procedure Component Value Units Date/Time    US Renal Bilateral [[id number]] Updated:  07/12/17 1949    US Renal Artery Complete [[id number]] Collected:  07/12/17 2008     Updated:  07/12/17 2016    Narrative:       EXAMINATION: US RENAL ARTERY COMPLETE- 7/12/2017 8:08 PM CDT     HISTORY: Uncontrolled hypertension.     REPORT: Sonographic images of the kidneys were obtained, examination  includes Doppler analysis of the renal arteries, using color, gray scale  and spectral Doppler techniques were duplex study. There are no  comparison studies.     The right kidney measures 10.3 x 5.5 x 4.9 cm and has normal morphology  and echogenicity. There is no hydronephrosis. The tail flow velocities  are provided on the ultrasound worksheet, however the origin the right  renal artery is obscured. The mid right renal artery, the peak systolic  velocity measures 116.8 cm/s, this corresponds with the velocity ratio  compared to the abdominal aorta of 1.72. The resistive index measures  0.55. Velocities in the distal right renal arteries are normal.     Left kidney measures 11.5 x 6.7 x 5.3 cm and has normal morphology and  echogenicity, there is no hydronephrosis on the left. Doppler images are  limited for the left kidney, both the origin and left mid renal artery  are obscured. The PSV at the distal left renal artery measures 99.2 cm/s  with a velocity ratio compared to the abdominal aorta of 1.46 and  resistive index of 0.60. The bladder appears unremarkable.       Impression:       1. Normal morphological appearance of both kidneys.  2. Limited study with obscuration of the renal artery origins as well as  the mid left renal  artery, no Doppler evidence for significant renal  artery stenosis is seen however. If more detailed evaluation is  indicated clinically, CTA would be the best study.  This report was finalized on 07/12/2017 20:13 by Dr. Marvin Mar MD.    XR Chest 1 View [[id number]] Collected:  07/13/17 0705     Updated:  07/13/17 0709    Narrative:       EXAMINATION: XR CHEST 1 VW-. 7/13/2017 7:05 AM CDT     CHEST, ONE VIEW:     HISTORY: Respiratory failure     COMPARISON: 07/12/2017     A single frontal chest radiograph was obtained.     FINDINGS:     Tracheostomy tube and NG tube identified. Left-sided central venous  catheter observed.     Patchy lower lobe airspace opacities again noted with diminished lung  volumes with shallow inspiration likely stable.                                     Impression:       1. Stable one-day appearance the chest.     This report was finalized on 07/13/2017 07:06 by Dr. Pierre Pan MD.        Films reviewed personally by me.  My interpretation: trach in place, stable from previous    Pulmonary Assessment:  1. Respiratory failure related to agitation from ethanol withdrawal.  2. Malignant HTN   3. PRES   4. Recent problems with angioedema which are improving   5. History of chronic ethanol use  6. C-diff   7. Tongue hematoma  8. MSSA respiratory culture    9. S/p tracheostomy placement -7-12-17   -  Recommend:   · Continue current mechanical ventilation, no vent changes made.  · Wean off propofol.  · Will need speech to evaluate and work with before we begin any wean efforts from vent.  · PT can now increase daily efforts with sedation being weaned off    Electronically signed by EPI Lincoln on 7/13/2017 at 8:45 AM    Physician substantive contribution:  Pertinent symptoms/interval history include: He is stable post tracheostomy.  Respiratory exam shows pertinent findings of reasonable air movement on chest exam.  Plan includes: Speech pathologist to evaluate patient.   Agree with continuing to try to wean sedation.  I have seen and examined patient personally, performing a face-to-face diagnostic evaluation with plan of care reviewed and developed with APRN and nursing staff. I have addended and/or modified the above history of present illness, physical examination, and assessment and plan to reflect my findings and impressions. Essential elements of the care plan were discussed with APRN above.  Agree with findings and assessment/plan as documented above.    Electronically signed by Indra Gallo MD, on 7/13/2017, 2:42 PM

## 2017-07-13 NOTE — PROGRESS NOTES
Nutrition Services    Patient Name:  Nishant Savage  YOB: 1965  MRN: [id number]  Admit Date:  7/3/2017    Nutrition follow up note. Per discussion with nursing this am, pt has had diarrhea and abdominal distention. Adjust enteral feeds to Vital  AF 1.2 at 50 ml/hr with auto flush of 25 ml/hr.  Rate may need to be adjusted as propofol is decreased. Cont to follow.      Electronically signed by:  Cherise Chaidez MS,RDN,LD  07/13/17 1:12 PM

## 2017-07-13 NOTE — PROGRESS NOTES
Continued Stay Note   [place] [facility]     Patient Name: Nishant Savage  MRN: [id number]  Today's Date: 7/13/2017    Admit Date: 7/3/2017          Discharge Plan       07/13/17 1107    Case Management/Social Work Plan    Plan LTAC referral    Additional Comments LTAC referral received.  Informed Janay Guillen with [facility] of referral.      07/13/17 0910    Case Management/Social Work Plan    Plan LTAC?    Additional Comments Patient remains in ICU on vent.  Would patient benefit from LTAC?  Please order LTAC referral when appropriate.              Discharge Codes     None        Expected Discharge Date and Time     Expected Discharge Date Expected Discharge Time    Jul 7, 2017             HENRIETTA Nava

## 2017-07-13 NOTE — PLAN OF CARE
Problem: Patient Care Overview (Adult)  Goal: Plan of Care Review  Outcome: Ongoing (interventions implemented as appropriate)    07/12/17 1140 07/13/17 0649   Coping/Psychosocial Response Interventions   Plan Of Care Reviewed With other (see comments)  (nursing) --    Patient Care Overview   Progress no change --    Outcome Evaluation   Outcome Summary/Follow up Plan --  pt more alert will follow commands and nod appropriately, VSS, belly distended hyperactive and tinkling sounds. resumed tube feeding          Problem: Seizure Disorder/Epilepsy (Adult)  Goal: Signs and Symptoms of Listed Potential Problems Will be Absent or Manageable (Seizure Disorder/Epilepsy)  Outcome: Ongoing (interventions implemented as appropriate)    Problem: Fall Risk (Adult)  Goal: Absence of Falls  Outcome: Ongoing (interventions implemented as appropriate)    Problem: Pressure Ulcer Risk (Roman Scale) (Adult,Obstetrics,Pediatric)  Goal: Skin Integrity  Outcome: Ongoing (interventions implemented as appropriate)    Problem: SAFETY - NON-VIOLENT RESTRAINT  Goal: Remains free of injury from restraints (Non-Violent Restraint)  Outcome: Ongoing (interventions implemented as appropriate)  Goal: Free from restraint(s) (Non-Violent Restraint)  Outcome: Ongoing (interventions implemented as appropriate)    Problem: Nutrition, Enteral (Adult)  Goal: Signs and Symptoms of Listed Potential Problems Will be Absent or Manageable (Nutrition, Enteral)  Outcome: Ongoing (interventions implemented as appropriate)

## 2017-07-13 NOTE — SIGNIFICANT NOTE
Spoke with nursing. She reported pt is still on vent, more alert, but agitated and not appropriate to have cuff deflated for Passy Ceci valve today. SLP will attempt Passy Ceci eval tomorrow if appropriate.     07/13/17 1224   Time Calculation- SLP   SLP Start Time 1219   SLP Stop Time 1224   SLP Time Calculation (min) 5 min   SLP Non-Billable Time (min) 5 min   SLP Received On 07/13/17   Daniela Heck CCC-SLP 7/13/2017 12:26 PM

## 2017-07-14 ENCOUNTER — APPOINTMENT (OUTPATIENT)
Dept: GENERAL RADIOLOGY | Facility: HOSPITAL | Age: 52
End: 2017-07-14

## 2017-07-14 LAB
ANION GAP SERPL CALCULATED.3IONS-SCNC: 12 MMOL/L (ref 4–13)
ARTERIAL PATENCY WRIST A: ABNORMAL
ATMOSPHERIC PRESS: ABNORMAL MMHG
BASE EXCESS BLDA CALC-SCNC: -0.7 MMOL/L (ref -2–2)
BASOPHILS # BLD AUTO: 0.04 10*3/MM3 (ref 0–0.2)
BASOPHILS NFR BLD AUTO: 0.2 % (ref 0–2)
BDY SITE: ABNORMAL
BUN BLD-MCNC: 24 MG/DL (ref 5–21)
BUN/CREAT SERPL: 24.2 (ref 7–25)
CALCIUM SPEC-SCNC: 9.1 MG/DL (ref 8.4–10.4)
CHLORIDE SERPL-SCNC: 111 MMOL/L (ref 98–110)
CO2 SERPL-SCNC: 24 MMOL/L (ref 24–31)
CREAT BLD-MCNC: 0.99 MG/DL (ref 0.5–1.4)
DEPRECATED RDW RBC AUTO: 47.7 FL (ref 40–54)
EOSINOPHIL # BLD AUTO: 0.1 10*3/MM3 (ref 0–0.7)
EOSINOPHIL NFR BLD AUTO: 0.6 % (ref 0–4)
ERYTHROCYTE [DISTWIDTH] IN BLOOD BY AUTOMATED COUNT: 14.5 % (ref 12–15)
GFR SERPL CREATININE-BSD FRML MDRD: 80 ML/MIN/1.73
GLUCOSE BLD-MCNC: 105 MG/DL (ref 70–100)
HCO3 BLDA-SCNC: 22.6 MMOL/L (ref 22–26)
HCT VFR BLD AUTO: 32.8 % (ref 40–52)
HGB BLD-MCNC: 10.9 G/DL (ref 14–18)
HOROWITZ INDEX BLD+IHG-RTO: 40 %
IMM GRANULOCYTES # BLD: 0.65 10*3/MM3 (ref 0–0.03)
IMM GRANULOCYTES NFR BLD: 4 % (ref 0–5)
LYMPHOCYTES # BLD AUTO: 3.02 10*3/MM3 (ref 0.72–4.86)
LYMPHOCYTES NFR BLD AUTO: 18.7 % (ref 15–45)
MCH RBC QN AUTO: 29.9 PG (ref 28–32)
MCHC RBC AUTO-ENTMCNC: 33.2 G/DL (ref 33–36)
MCV RBC AUTO: 90.1 FL (ref 82–95)
MODALITY: ABNORMAL
MONOCYTES # BLD AUTO: 1.29 10*3/MM3 (ref 0.19–1.3)
MONOCYTES NFR BLD AUTO: 8 % (ref 4–12)
NEUTROPHILS # BLD AUTO: 11.04 10*3/MM3 (ref 1.87–8.4)
NEUTROPHILS NFR BLD AUTO: 68.5 % (ref 39–78)
PCO2 BLDA: 33.7 MM HG (ref 35–45)
PEEP RESPIRATORY: 8 CM[H2O]
PH BLDA: 7.44 PH UNITS (ref 7.35–7.45)
PLATELET # BLD AUTO: 400 10*3/MM3 (ref 130–400)
PMV BLD AUTO: 9.8 FL (ref 6–12)
PO2 BLDA: 75.2 MM HG (ref 80–100)
POTASSIUM BLD-SCNC: 2.9 MMOL/L (ref 3.5–5.3)
RBC # BLD AUTO: 3.64 10*6/MM3 (ref 4.8–5.9)
SAO2 % BLDCOA: 95.7 % (ref 94–100)
SAO2 % BLDCOA: 95.7 % (ref 94–100)
SODIUM BLD-SCNC: 147 MMOL/L (ref 135–145)
TOTAL RATE: 10 BREATHS/MINUTE
VENT CPAP/PEEP: 8
VENTILATOR MODE: ABNORMAL
WBC NRBC COR # BLD: 16.14 10*3/MM3 (ref 4.8–10.8)

## 2017-07-14 PROCEDURE — 82803 BLOOD GASES ANY COMBINATION: CPT

## 2017-07-14 PROCEDURE — 25010000002 LEVOFLOXACIN PER 250 MG: Performed by: FAMILY MEDICINE

## 2017-07-14 PROCEDURE — 85025 COMPLETE CBC W/AUTO DIFF WBC: CPT | Performed by: FAMILY MEDICINE

## 2017-07-14 PROCEDURE — 94003 VENT MGMT INPAT SUBQ DAY: CPT

## 2017-07-14 PROCEDURE — 25010000002 ENOXAPARIN PER 10 MG: Performed by: FAMILY MEDICINE

## 2017-07-14 PROCEDURE — 25010000002 HYDROMORPHONE PER 4 MG: Performed by: FAMILY MEDICINE

## 2017-07-14 PROCEDURE — 25010000002 THIAMINE PER 100 MG: Performed by: INTERNAL MEDICINE

## 2017-07-14 PROCEDURE — 94799 UNLISTED PULMONARY SVC/PX: CPT

## 2017-07-14 PROCEDURE — 99232 SBSQ HOSP IP/OBS MODERATE 35: CPT | Performed by: PSYCHIATRY & NEUROLOGY

## 2017-07-14 PROCEDURE — 36600 WITHDRAWAL OF ARTERIAL BLOOD: CPT

## 2017-07-14 PROCEDURE — 71010 HC CHEST PA OR AP: CPT

## 2017-07-14 PROCEDURE — 94770: CPT

## 2017-07-14 PROCEDURE — 94760 N-INVAS EAR/PLS OXIMETRY 1: CPT

## 2017-07-14 PROCEDURE — 25010000002 LORAZEPAM PER 2 MG: Performed by: INTERNAL MEDICINE

## 2017-07-14 PROCEDURE — 25010000002 PIPERACILLIN SOD-TAZOBACTAM PER 1 G: Performed by: FAMILY MEDICINE

## 2017-07-14 PROCEDURE — 80048 BASIC METABOLIC PNL TOTAL CA: CPT | Performed by: FAMILY MEDICINE

## 2017-07-14 PROCEDURE — 25010000003 LEVETIRACETAM IN NACL 0.75% 1000 MG/100ML SOLUTION: Performed by: PSYCHIATRY & NEUROLOGY

## 2017-07-14 PROCEDURE — 99231 SBSQ HOSP IP/OBS SF/LOW 25: CPT | Performed by: OTOLARYNGOLOGY

## 2017-07-14 PROCEDURE — 25010000002 VANCOMYCIN 10 G RECONSTITUTED SOLUTION: Performed by: FAMILY MEDICINE

## 2017-07-14 PROCEDURE — 74000 HC ABDOMEN KUB: CPT

## 2017-07-14 PROCEDURE — 25010000002 ONDANSETRON PER 1 MG: Performed by: INTERNAL MEDICINE

## 2017-07-14 PROCEDURE — 97110 THERAPEUTIC EXERCISES: CPT

## 2017-07-14 PROCEDURE — 25010000002 MAGNESIUM SULFATE IN D5W 1G/100ML (PREMIX) 1-5 GM/100ML-% SOLUTION: Performed by: INTERNAL MEDICINE

## 2017-07-14 RX ORDER — POTASSIUM CHLORIDE 20MEQ/15ML
40 LIQUID (ML) ORAL 2 TIMES DAILY
Status: DISCONTINUED | OUTPATIENT
Start: 2017-07-14 | End: 2017-07-15

## 2017-07-14 RX ORDER — LEVETIRACETAM 100 MG/ML
1000 SOLUTION ORAL EVERY 12 HOURS SCHEDULED
Status: DISCONTINUED | OUTPATIENT
Start: 2017-07-14 | End: 2017-07-16

## 2017-07-14 RX ORDER — POTASSIUM CHLORIDE 20MEQ/15ML
40 LIQUID (ML) ORAL ONCE
Status: COMPLETED | OUTPATIENT
Start: 2017-07-14 | End: 2017-07-14

## 2017-07-14 RX ORDER — DIAZEPAM 2 MG/1
2 TABLET ORAL EVERY 8 HOURS SCHEDULED
Status: DISCONTINUED | OUTPATIENT
Start: 2017-07-14 | End: 2017-07-17

## 2017-07-14 RX ORDER — MAGNESIUM SULFATE 1 G/100ML
1 INJECTION INTRAVENOUS ONCE
Status: COMPLETED | OUTPATIENT
Start: 2017-07-14 | End: 2017-07-14

## 2017-07-14 RX ORDER — CARVEDILOL 25 MG/1
25 TABLET ORAL EVERY 12 HOURS SCHEDULED
Status: DISCONTINUED | OUTPATIENT
Start: 2017-07-14 | End: 2017-07-19 | Stop reason: HOSPADM

## 2017-07-14 RX ORDER — LOSARTAN POTASSIUM 50 MG/1
50 TABLET ORAL
Status: DISCONTINUED | OUTPATIENT
Start: 2017-07-14 | End: 2017-07-17

## 2017-07-14 RX ADMIN — ALBUTEROL SULFATE 2.5 MG: 2.5 SOLUTION RESPIRATORY (INHALATION) at 07:05

## 2017-07-14 RX ADMIN — LEVOFLOXACIN 750 MG: 5 INJECTION, SOLUTION INTRAVENOUS at 11:28

## 2017-07-14 RX ADMIN — ALBUTEROL SULFATE 2.5 MG: 2.5 SOLUTION RESPIRATORY (INHALATION) at 00:12

## 2017-07-14 RX ADMIN — METOPROLOL TARTRATE 100 MG: 100 TABLET ORAL at 08:09

## 2017-07-14 RX ADMIN — FAMOTIDINE 20 MG: 20 TABLET, FILM COATED ORAL at 08:09

## 2017-07-14 RX ADMIN — AMLODIPINE BESYLATE 10 MG: 10 TABLET ORAL at 08:09

## 2017-07-14 RX ADMIN — POTASSIUM CHLORIDE 40 MEQ: 20 SOLUTION ORAL at 17:23

## 2017-07-14 RX ADMIN — DEXTROMETHORPHAN 30 MG: 30 SUSPENSION, EXTENDED RELEASE ORAL at 08:09

## 2017-07-14 RX ADMIN — DEXTROMETHORPHAN 30 MG: 30 SUSPENSION, EXTENDED RELEASE ORAL at 17:23

## 2017-07-14 RX ADMIN — HYDROMORPHONE HYDROCHLORIDE 1 MG: 1 INJECTION, SOLUTION INTRAMUSCULAR; INTRAVENOUS; SUBCUTANEOUS at 08:08

## 2017-07-14 RX ADMIN — ENOXAPARIN SODIUM 40 MG: 40 INJECTION SUBCUTANEOUS at 08:10

## 2017-07-14 RX ADMIN — METRONIDAZOLE 500 MG: 500 TABLET, FILM COATED ORAL at 14:29

## 2017-07-14 RX ADMIN — THIAMINE HYDROCHLORIDE 100 MG: 100 INJECTION, SOLUTION INTRAMUSCULAR; INTRAVENOUS at 08:09

## 2017-07-14 RX ADMIN — ONDANSETRON 4 MG: 2 INJECTION INTRAMUSCULAR; INTRAVENOUS at 18:37

## 2017-07-14 RX ADMIN — TAZOBACTAM SODIUM AND PIPERACILLIN SODIUM 3.38 G: 375; 3 INJECTION, SOLUTION INTRAVENOUS at 17:22

## 2017-07-14 RX ADMIN — POTASSIUM CHLORIDE 40 MEQ: 20 SOLUTION ORAL at 13:06

## 2017-07-14 RX ADMIN — LEVETIRACETAM 1000 MG: 1000 INJECTION, SOLUTION INTRAVENOUS at 08:09

## 2017-07-14 RX ADMIN — HYDROMORPHONE HYDROCHLORIDE 1 MG: 1 INJECTION, SOLUTION INTRAMUSCULAR; INTRAVENOUS; SUBCUTANEOUS at 18:37

## 2017-07-14 RX ADMIN — CARVEDILOL 25 MG: 25 TABLET, FILM COATED ORAL at 21:29

## 2017-07-14 RX ADMIN — TAZOBACTAM SODIUM AND PIPERACILLIN SODIUM 3.38 G: 375; 3 INJECTION, SOLUTION INTRAVENOUS at 21:35

## 2017-07-14 RX ADMIN — LORAZEPAM 1 MG: 2 INJECTION INTRAMUSCULAR; INTRAVENOUS at 13:07

## 2017-07-14 RX ADMIN — CHLORHEXIDINE GLUCONATE 15 ML: 1.2 RINSE ORAL at 21:34

## 2017-07-14 RX ADMIN — TAZOBACTAM SODIUM AND PIPERACILLIN SODIUM 3.38 G: 375; 3 INJECTION, SOLUTION INTRAVENOUS at 04:04

## 2017-07-14 RX ADMIN — ALBUTEROL SULFATE 2.5 MG: 2.5 SOLUTION RESPIRATORY (INHALATION) at 18:58

## 2017-07-14 RX ADMIN — FAMOTIDINE 20 MG: 20 TABLET, FILM COATED ORAL at 21:29

## 2017-07-14 RX ADMIN — MAGNESIUM SULFATE HEPTAHYDRATE 1 G: 1 INJECTION, SOLUTION INTRAVENOUS at 09:17

## 2017-07-14 RX ADMIN — METRONIDAZOLE 500 MG: 500 TABLET, FILM COATED ORAL at 06:25

## 2017-07-14 RX ADMIN — DIAZEPAM 2 MG: 2 TABLET ORAL at 21:29

## 2017-07-14 RX ADMIN — POTASSIUM CHLORIDE 40 MEQ: 20 SOLUTION ORAL at 04:27

## 2017-07-14 RX ADMIN — LOSARTAN POTASSIUM 50 MG: 50 TABLET ORAL at 13:06

## 2017-07-14 RX ADMIN — CHLORHEXIDINE GLUCONATE 15 ML: 1.2 RINSE ORAL at 08:09

## 2017-07-14 RX ADMIN — LEVETIRACETAM 1000 MG: 100 SOLUTION ORAL at 22:33

## 2017-07-14 RX ADMIN — METRONIDAZOLE 500 MG: 500 TABLET, FILM COATED ORAL at 21:29

## 2017-07-14 RX ADMIN — VANCOMYCIN HYDROCHLORIDE 1250 MG: 10 INJECTION, POWDER, LYOPHILIZED, FOR SOLUTION INTRAVENOUS at 08:09

## 2017-07-14 RX ADMIN — ALBUTEROL SULFATE 2.5 MG: 2.5 SOLUTION RESPIRATORY (INHALATION) at 13:14

## 2017-07-14 RX ADMIN — TAZOBACTAM SODIUM AND PIPERACILLIN SODIUM 3.38 G: 375; 3 INJECTION, SOLUTION INTRAVENOUS at 10:34

## 2017-07-14 RX ADMIN — DIAZEPAM 2 MG: 2 TABLET ORAL at 13:06

## 2017-07-14 NOTE — PLAN OF CARE
Problem: Pressure Ulcer Risk (Roman Scale) (Adult,Obstetrics,Pediatric)  Goal: Skin Integrity  Outcome: Ongoing (interventions implemented as appropriate)    07/14/17 3926   Pressure Ulcer Risk (Roman Scale) (Adult,Obstetrics,Pediatric)   Skin Integrity making progress toward outcome

## 2017-07-14 NOTE — PLAN OF CARE
Problem: Seizure Disorder/Epilepsy (Adult)  Goal: Signs and Symptoms of Listed Potential Problems Will be Absent or Manageable (Seizure Disorder/Epilepsy)  Outcome: Ongoing (interventions implemented as appropriate)    07/14/17 1559   Seizure Disorder/Epilepsy   Problems Assessed (Seizure Disorder/Epilepsy) all   Problems Present (Seizure Disorder/Epilepsy) situational response

## 2017-07-14 NOTE — PROGRESS NOTES
[facility] Medicine Services  INPATIENT PROGRESS NOTE    Length of Stay: 10  Date of Admission: 7/3/2017  Primary Care Physician: Linda Hoffman, DNP, APRN    Subjective   Chief Complaint: alcohol withdrawal complicated by hypertension and respiratory failure  HPI   I took care of this patient the last time that I was on.  I am familiar with his case.  He ultimately required tracheostomy placement with Dr. Pierson secondary to failure to wean from ventilatory support complicated by the problems with his tongue.    He is awake and alert today.  He does not seem combative or agitated to me at this point in time, but nursing tells me that when they try to have him out of restraints that he pulls at tubes and lines.    Review of Systems   Difficult given his tracheostomy status.  He does seem to follow simple commands and does not localize any pain.    Objective    Temp:  [97.2 °F (36.2 °C)-97.8 °F (36.6 °C)] 97.2 °F (36.2 °C)  Heart Rate:  [41-88] 79  Resp:  [10-17] 14  BP: (148-218)/() 183/103  FiO2 (%):  [40 %] 40 %  Physical Exam  Constitutional: No distress.   Seen and discussed with his nurse, Bao. No family present at the moment. He is calm on tracheostomy driven ventilatory support. PICC.      Head: Normocephalic and atraumatic.   Eyes: Pupils are equal, round, and reactive to light.   Neck: Neck supple. No JVD present.   Cardiovascular: Normal rate and regular rhythm.   Pulmonary/Chest: Effort normal and breath sounds normal. Ventilated.    Abdominal: Soft. Bowel sounds are normal.   Musculoskeletal: He exhibits edema (trace).   Neurological: He seems appropriate.  He follows simple commands and tries to discuss things with me even though I cannot understand him because of the tracheostomy.  Skin: Skin is warm and dry.     Intake/Output Summary (Last 24 hours) at 07/14/17 0817  Last data filed at 07/14/17 0352   Gross per 24 hour   Intake           2154.1 ml      Output             2200 ml   Net            -45.9 ml     Results Review:  I have reviewed the labs, radiology results, and diagnostic studies.    Laboratory Data:     Results from last 7 days  Lab Units 07/14/17  0257 07/12/17  0226 07/08/17  1057   WBC 10*3/mm3 16.14* 16.64* 12.30*   HEMOGLOBIN g/dL 10.9* 10.4* 10.4*   HEMATOCRIT % 32.8* 31.3* 31.5*   PLATELETS 10*3/mm3 400 349 199       Results from last 7 days  Lab Units 07/14/17  0257 07/12/17  0226 07/11/17  0142 07/10/17  0338   SODIUM mmol/L 147* 147* 147* 144   POTASSIUM mmol/L 2.9* 3.3* 3.7 4.1   CHLORIDE mmol/L 111* 106 109 111*   CO2 mmol/L 24.0 28.0 25.0 24.0   BUN mg/dL 24* 29* 21 13   CREATININE mg/dL 0.99 1.06 1.07 0.98   CALCIUM mg/dL 9.1 9.0 8.9 8.6   BILIRUBIN mg/dL  --   --   --  0.5   ALK PHOS U/L  --   --   --  155*   ALT (SGPT) U/L  --   --   --  42   AST (SGOT) U/L  --   --   --  51*   GLUCOSE mg/dL 105* 147* 143* 182*     Culture Data:   Respiratory Culture   Date Value Ref Range Status   07/08/2017 Heavy growth (4+) Staphylococcus aureus (A)  Final     Radiology Data:   Imaging Results (last 24 hours)     Procedure Component Value Units Date/Time    US Renal Artery Complete [[id number]] Collected:  07/12/17 2008     Updated:  07/13/17 1645    Narrative:       EXAMINATION: US RENAL ARTERY COMPLETE- 7/12/2017 8:08 PM CDT     HISTORY: Uncontrolled hypertension.     REPORT: Sonographic images of the kidneys were obtained, examination  includes Doppler analysis of the renal arteries, using color, gray scale  and spectral Doppler techniques were duplex study. There are no  comparison studies.     The right kidney measures 10.3 x 5.5 x 4.9 cm and has normal morphology  and echogenicity. There is no hydronephrosis. The tail flow velocities  are provided on the ultrasound worksheet, however the origin the right  renal artery is obscured. The mid right renal artery, the peak systolic  velocity measures 116.8 cm/s, this corresponds with the velocity  ratio  compared to the abdominal aorta of 1.72. The resistive index measures  0.55. Velocities in the distal right renal arteries are normal.     Left kidney measures 11.5 x 6.7 x 5.3 cm and has normal morphology and  echogenicity, there is no hydronephrosis on the left. Doppler images are  limited for the left kidney, both the origin and left mid renal artery  are obscured. The PSV at the distal left renal artery measures 99.2 cm/s  with a velocity ratio compared to the abdominal aorta of 1.46 and  resistive index of 0.60. The bladder appears unremarkable.       Impression:       1. Normal morphological appearance of both kidneys.  2. Limited study with obscuration of the renal artery origins as well as  the mid left renal artery, no Doppler evidence for significant renal  artery stenosis is seen however. If more detailed evaluation is  indicated clinically, CTA would be the best study.  This report was finalized on 07/12/2017 20:13 by Dr. Marvin Mar MD.    US Renal Bilateral [[id number]] Collected:  07/12/17 2008     Updated:  07/13/17 1645    Narrative:       EXAMINATION: US RENAL ARTERY COMPLETE- 7/12/2017 8:08 PM CDT     HISTORY: Uncontrolled hypertension.     REPORT: Sonographic images of the kidneys were obtained, examination  includes Doppler analysis of the renal arteries, using color, gray scale  and spectral Doppler techniques were duplex study. There are no  comparison studies.     The right kidney measures 10.3 x 5.5 x 4.9 cm and has normal morphology  and echogenicity. There is no hydronephrosis. The tail flow velocities  are provided on the ultrasound worksheet, however the origin the right  renal artery is obscured. The mid right renal artery, the peak systolic  velocity measures 116.8 cm/s, this corresponds with the velocity ratio  compared to the abdominal aorta of 1.72. The resistive index measures  0.55. Velocities in the distal right renal arteries are normal.     Left kidney measures 11.5 x  6.7 x 5.3 cm and has normal morphology and  echogenicity, there is no hydronephrosis on the left. Doppler images are  limited for the left kidney, both the origin and left mid renal artery  are obscured. The PSV at the distal left renal artery measures 99.2 cm/s  with a velocity ratio compared to the abdominal aorta of 1.46 and  resistive index of 0.60. The bladder appears unremarkable.       Impression:       1. Normal morphological appearance of both kidneys.  2. Limited study with obscuration of the renal artery origins as well as  the mid left renal artery, no Doppler evidence for significant renal  artery stenosis is seen however. If more detailed evaluation is  indicated clinically, CTA would be the best study.  This report was finalized on 07/12/2017 20:13 by Dr. Marvin Mar MD.    XR Chest 1 View [[id number]] Collected:  07/14/17 0721     Updated:  07/14/17 0725    Narrative:       Frontal supine radiograph of the chest 7/14/2017 2:10 CST     History: respiratory failure, patient intubated.; R13.12-Dysphagia,  oropharyngeal phase; R56.9-Unspecified convulsions;  T78.3XXS-Angioneurotic edema, sequela; F10.231-Alcohol dependence with  withdrawal delirium; Z74.09-Other reduced mobility     Comparison: Chest x-ray dated 07/13/2017      Findings:   Right PICC in place terminating near the atrial caval junction. Left  subclavian catheter has been removed. Remaining lines and tubes are  stable in position. No new opacities or pneumothoraces are visualized in  the chest. The cardiomediastinal silhouette and pulmonary vascularity  are unchanged.       No acute osseous or soft tissue abnormality is noted.        Impression:       Impression:    No significant interval change since previous exam.        This report was finalized on 07/14/2017 07:22 by Dr. Martita Finnegan MD.          Results from last 7 days  Lab Units 07/14/17  0225   PH, ARTERIAL pH units 7.444   PO2 ART mm Hg 75.2*   PCO2, ARTERIAL mm Hg 33.7*      HCO3 ART mmol/L 22.6     I have reviewed the patient current medications.     Assessment/Plan   Assessment:   1. Possible angioedema related to lisinopril.  2. Malignant hypertension.  3. Possible new onset seizure disorder versus convulsive syncope.  4. Posterior reversible encephalopathy syndrome on MRI.  5. Tongue bite with sublingual hematoma.  6. Tobacco abuse.  7. Obstructive sleep apnea, noncompliant with device.  8. Gouty arthritis.  9. Daily alcohol use with Delirium tremens requiring sedation and mechanical ventilation.  10. Hypokalemia.   11. Clostridium difficile colitis.  12. Possible lingual or sublingual infection, MSSA on culture.  13. Bilateral lower lobe infiltrates consistent with ventilator associated pneumonia combined with atelectasis.      Plan:  He continues to have problems with difficult to control blood pressure.  Dr. Encinas had been increasing his medications recently.  I would change his metoprolol to carvedilol.  I would stop the terazosin.  I would place him on losartan.  There was some question as to whether or not he had tongue swelling on lisinopril.  However, angiotensin receptor blockers should be okay as he needs these for blood pressure control.  Continue amlodipine.    He will remain on Keppra for at least 6-8 weeks per the neurology service.     He has been on broad-spectrum antibiotics for potential pneumonia since the 11th.  The Staph in his sputum is MSSA.  I would discontinue vancomycin for now.  Continue with Zosyn and Levaquin.  He is also completing Flagyl Clostridium difficile toxin present in his stool.    Pulmonology continuing with vent weaning.    Dr. nEcinas placed him on as needed Geodon for agitation.  I would place him back on a small dose of scheduled Valium.    Replace potassium aggressively.  He has not had a recent magnesium level.    There has been some discussion about LTAC placement.  I will discuss with  today.    Jairon Perez, DO    07/14/17   8:15 AM

## 2017-07-14 NOTE — PROGRESS NOTES
PULMONARY AND CRITICAL CARE PROGRESS NOTE - [facility]    Patient: Nishant Savage    1965    MR# [id number]    Acct# [id number]  07/14/17   7:19 AM  Referring Provider: Jairon Perez DO    Chief Complaint: Mechanically ventilated, s/p tracheostomy     Interval history: He's awake and very alert, off propofol now. Tongue edema is much improved and speech is working with him. ABG's and CXR remain stable. I turned him to CPAP mode and he is tolerating well and gives me a thumbs up sign. No other aggravating or allevitating factors.       Meds:    albuterol 2.5 mg Nebulization Q6H - RT   amLODIPine 10 mg Per G Tube Q24H   chlorhexidine 15 mL Mouth/Throat Q12H   CloNIDine 1 patch Transdermal Weekly   dextromethorphan polistirex ER 30 mg Oral BID   enoxaparin 40 mg Subcutaneous Daily   famotidine 20 mg Per G Tube Q12H   levETIRAcetam 1,000 mg Intravenous Q12H   levoFLOXacin 750 mg Intravenous Q24H   metoprolol tartrate 100 mg Nasogastric Q12H   metroNIDAZOLE 500 mg Per G Tube Q8H   piperacillin-tazobactam 3.375 g Intravenous Q6H   terazosin 10 mg Per G Tube Nightly   thiamine (VITAMIN B1) IVPB 100 mg Intravenous Daily   vancomycin 1,250 mg Intravenous Q12H       niCARdipine 5-15 mg/hr Last Rate: 5 mg/hr (07/10/17 0448)   Pharmacy to dose vancomycin     propofol 5-50 mcg/kg/min Last Rate: Stopped (07/13/17 1943)     Review of Systems:   Cannot obtain due to mechanical ventilation/tracheostomy.    Ventilator Settings:  Vent Mode: VC/AC  Vt (Set, L): 0.65 L  Resp Rate (Set): 10  Pressure Support (cm H2O): 0 cm H20  FiO2 (%): 40 %  PEEP/CPAP (cm H2O): 8 cm H20  Minute Ventilation (L/min) (Obs): 15.2 L/min  Resp Rate (Observed) Vent: 24  I:E Ratio (Set): 1.00:1  I:E Ratio (Obs): 1:2.4  PIP Observed (cm H2O): 23 cm H2O  RSBI: 202    Physical Exam:  Temp:  [97.2 °F (36.2 °C)-97.8 °F (36.6 °C)] 97.2 °F (36.2 °C)  Heart Rate:  [41-76] 67  Resp:  [10-17] 14  BP: (148-218)/() 173/104  FiO2 (%):  [40 %]  40 %    Intake/Output Summary (Last 24 hours) at 07/14/17 0719  Last data filed at 07/14/17 0352   Gross per 24 hour   Intake           2514.9 ml   Output             3350 ml   Net           -835.1 ml     SpO2 Readings from Last 3 Encounters:   07/14/17 97%   09/30/16 98%   02/04/16 96%     Physical Exam   Gen: Awake and alert, following commands. Appears oriented.   HEENT: Endotracheal tube to trach with some scant bloody secretions. Tongue edema is improved.   Neck: Supple, ETT to trach.  Respiratory: Fair air movement bilaterally with a few scattered rhonchi.   Cardiac: regular rate and rhythm in the 60's. B/P remains very elevated. No murmur or gallop noted.   Abdomen: distended, soft, bowel sounds present.   Neurologic: He is alert and following all commands.   Psychiatric: Fairly calm, trying to sit up.    Dermatologic: No rash noted.  Extremities: +generalized edema, SCDs are in place.  Musculoskeletal: No joint deformities noted.  Lymphatic: No adenopathy palpated    Results from last 7 days  Lab Units 07/14/17  0257 07/12/17  0226 07/08/17  1057   WBC 10*3/mm3 16.14* 16.64* 12.30*   HEMOGLOBIN g/dL 10.9* 10.4* 10.4*   PLATELETS 10*3/mm3 400 349 199       Results from last 7 days  Lab Units 07/14/17  0257 07/12/17  0226 07/11/17  0142   SODIUM mmol/L 147* 147* 147*   POTASSIUM mmol/L 2.9* 3.3* 3.7   BUN mg/dL 24* 29* 21   CREATININE mg/dL 0.99 1.06 1.07       Results from last 7 days  Lab Units 07/14/17  0225 07/13/17  0317 07/12/17  0310   PH, ARTERIAL pH units 7.444 7.441 7.498*   PCO2, ARTERIAL mm Hg 33.7* 38.8 36.6   PO2 ART mm Hg 75.2* 105.3* 76.8*   FIO2 % 40 40 40        Recent films:  Imaging Results (last 24 hours)     Procedure Component Value Units Date/Time    US Renal Artery Complete [[id number]] Collected:  07/12/17 2008     Updated:  07/13/17 1645    Narrative:       EXAMINATION: US RENAL ARTERY COMPLETE- 7/12/2017 8:08 PM CDT     HISTORY: Uncontrolled hypertension.     REPORT: Sonographic  images of the kidneys were obtained, examination  includes Doppler analysis of the renal arteries, using color, gray scale  and spectral Doppler techniques were duplex study. There are no  comparison studies.     The right kidney measures 10.3 x 5.5 x 4.9 cm and has normal morphology  and echogenicity. There is no hydronephrosis. The tail flow velocities  are provided on the ultrasound worksheet, however the origin the right  renal artery is obscured. The mid right renal artery, the peak systolic  velocity measures 116.8 cm/s, this corresponds with the velocity ratio  compared to the abdominal aorta of 1.72. The resistive index measures  0.55. Velocities in the distal right renal arteries are normal.     Left kidney measures 11.5 x 6.7 x 5.3 cm and has normal morphology and  echogenicity, there is no hydronephrosis on the left. Doppler images are  limited for the left kidney, both the origin and left mid renal artery  are obscured. The PSV at the distal left renal artery measures 99.2 cm/s  with a velocity ratio compared to the abdominal aorta of 1.46 and  resistive index of 0.60. The bladder appears unremarkable.       Impression:       1. Normal morphological appearance of both kidneys.  2. Limited study with obscuration of the renal artery origins as well as  the mid left renal artery, no Doppler evidence for significant renal  artery stenosis is seen however. If more detailed evaluation is  indicated clinically, CTA would be the best study.  This report was finalized on 07/12/2017 20:13 by Dr. Marvin Mar MD.    US Renal Bilateral [[id number]] Collected:  07/12/17 2008     Updated:  07/13/17 1645    Narrative:       EXAMINATION: US RENAL ARTERY COMPLETE- 7/12/2017 8:08 PM CDT     HISTORY: Uncontrolled hypertension.     REPORT: Sonographic images of the kidneys were obtained, examination  includes Doppler analysis of the renal arteries, using color, gray scale  and spectral Doppler techniques were duplex  study. There are no  comparison studies.     The right kidney measures 10.3 x 5.5 x 4.9 cm and has normal morphology  and echogenicity. There is no hydronephrosis. The tail flow velocities  are provided on the ultrasound worksheet, however the origin the right  renal artery is obscured. The mid right renal artery, the peak systolic  velocity measures 116.8 cm/s, this corresponds with the velocity ratio  compared to the abdominal aorta of 1.72. The resistive index measures  0.55. Velocities in the distal right renal arteries are normal.     Left kidney measures 11.5 x 6.7 x 5.3 cm and has normal morphology and  echogenicity, there is no hydronephrosis on the left. Doppler images are  limited for the left kidney, both the origin and left mid renal artery  are obscured. The PSV at the distal left renal artery measures 99.2 cm/s  with a velocity ratio compared to the abdominal aorta of 1.46 and  resistive index of 0.60. The bladder appears unremarkable.       Impression:       1. Normal morphological appearance of both kidneys.  2. Limited study with obscuration of the renal artery origins as well as  the mid left renal artery, no Doppler evidence for significant renal  artery stenosis is seen however. If more detailed evaluation is  indicated clinically, CTA would be the best study.  This report was finalized on 07/12/2017 20:13 by Dr. Marvin Mar MD.    XR Chest 1 View [[id number]] Updated:  07/14/17 0328        Films reviewed personally by me.  My interpretation: trach in place, stable from previous    Pulmonary Assessment:  1. Respiratory failure related to agitation from ethanol withdrawal.  2. Malignant HTN-persistent  3. History of chronic ethanol use  4. C-diff   5. Tongue hematoma-improving  6. MSSA respiratory culture    7. S/p tracheostomy placement -7-12-17   -  Recommend:   · CPAP mode as tolerated for weaning efforts.  · Allow patient to sit up on the side of the bed with PT assist today, he's eager to  move around.  · Continue nebs.     Electronically signed by EPI Lincoln on 7/14/2017 at 7:19 AM    Physician substantive contribution:  Pertinent symptoms/interval history include: He is currently on CPAP and is tolerating this well.  Respiratory exam shows pertinent findings of reasonable air movement on chest exam.  Plan includes: We will try him on trach collar as he tolerates.  I have seen and examined patient personally, performing a face-to-face diagnostic evaluation with plan of care reviewed and developed with APRN and nursing staff. I have addended and/or modified the above history of present illness, physical examination, and assessment and plan to reflect my findings and impressions. Essential elements of the care plan were discussed with APRN above.  Agree with findings and assessment/plan as documented above.    Electronically signed by Indra Gallo MD, on 7/14/2017, 3:40 PM

## 2017-07-14 NOTE — PROGRESS NOTES
Neurology Progress Note      Chief Complaint:  Seizure, Malignant HTN, PRES, EtOH w/d, Respiratory failure on Vent    Subjective     Subjective:      The patient is awake this morning and utilizing the communication board correctly.  He is waking up well and following commands.  No agitation.      Medications:  Current Facility-Administered Medications   Medication Dose Route Frequency Provider Last Rate Last Dose   • acetaminophen (TYLENOL) tablet 650 mg  650 mg Oral Q4H PRN Jairon Perez DO   650 mg at 07/09/17 1003   • albuterol (PROVENTIL) nebulizer solution 0.083% 2.5 mg/3mL  2.5 mg Nebulization Q6H - RT Jairon Perez, DO   2.5 mg at 07/14/17 0705   • amLODIPine (NORVASC) tablet 10 mg  10 mg Per G Tube Q24H Cristino Encinas DO   10 mg at 07/14/17 0809   • carvedilol (COREG) tablet 25 mg  25 mg Oral Q12H Jairon Perez DO       • chlorhexidine (PERIDEX) 0.12 % solution 15 mL  15 mL Mouth/Throat Q12H Cristino Encinas DO   15 mL at 07/14/17 0809   • CloNIDine (CATAPRES-TTS) 0.3 MG/24HR patch 1 patch  1 patch Transdermal Weekly Cristino Encinas DO   1 patch at 07/11/17 1423   • dextromethorphan polistirex ER (DELSYM) 30 MG/5ML oral suspension 30 mg  30 mg Oral BID Robe Dorsey, DO   30 mg at 07/14/17 0809   • diazePAM (VALIUM) tablet 2 mg  2 mg Nasogastric Q8H Jairon Perez DO       • enoxaparin (LOVENOX) syringe 40 mg  40 mg Subcutaneous Daily Cristino Encinas DO   40 mg at 07/14/17 0810   • famotidine (PEPCID) tablet 20 mg  20 mg Per G Tube Q12H Cristino Encinas DO   20 mg at 07/14/17 0809   • HYDROcodone-acetaminophen (NORCO)  MG per tablet 1 tablet  1 tablet Oral Q4H PRN Cristino Encinas DO   1 tablet at 07/03/17 1758   • HYDROcodone-acetaminophen (NORCO) 5-325 MG per tablet 1 tablet  1 tablet Oral Q4H PRN Cristino Encinas DO       • HYDROmorphone (DILAUDID) injection 1 mg  1 mg Intravenous Q3H PRN Cristino Encinas DO   1 mg at 07/14/17 0808   • ibuprofen  (ADVIL,MOTRIN) 100 MG/5ML suspension 200 mg  200 mg Oral Q6H PRN Cristino Encinas, DO   200 mg at 07/09/17 1342   • levETIRAcetam in NaCl 0.75% (KEPPRA) IVPB 1,000 mg  1,000 mg Intravenous Q12H Royal Campos MD 0 mL/hr at 07/09/17 0930 1,000 mg at 07/14/17 0809   • levoFLOXacin (LEVAQUIN) 750 mg/150 mL D5W (premix) (LEVAQUIN) 750 mg  750 mg Intravenous Q24H Cristino Encinas, DO   750 mg at 07/13/17 1141   • LORazepam (ATIVAN) tablet 1 mg  1 mg Oral Q2H PRN Cristino Encinas DO        Or   • LORazepam (ATIVAN) injection 1 mg  1 mg Intravenous Q2H PRN Cristino Encinas DO        Or   • LORazepam (ATIVAN) tablet 2 mg  2 mg Oral Q1H PRN Cristino Encinas DO        Or   • LORazepam (ATIVAN) injection 2 mg  2 mg Intravenous Q1H PRN Cristino Encinas DO   2 mg at 07/13/17 1009    Or   • LORazepam (ATIVAN) injection 2 mg  2 mg Intravenous Q15 Min PRN Cristino Encinas DO        Or   • LORazepam (ATIVAN) injection 2 mg  2 mg Intramuscular Q15 Min PRN Cristino Encinas DO        Or   • LORazepam (ATIVAN) tablet 4 mg  4 mg Oral Q1H PRN Cristino Encinas DO        Or   • LORazepam (ATIVAN) injection 4 mg  4 mg Intravenous Q1H PRN Cristino Encinas DO   4 mg at 07/13/17 1457   • LORazepam (ATIVAN) injection 1 mg  1 mg Intravenous Q8H PRN Erwin Glass MD   1 mg at 07/13/17 2310   • losartan (COZAAR) tablet 50 mg  50 mg Oral Q24H Jairon Perez DO       • metroNIDAZOLE (FLAGYL) tablet 500 mg  500 mg Per G Tube Q8H Cristino Encinas DO   500 mg at 07/14/17 0625   • naloxone (NARCAN) injection 0.4 mg  0.4 mg Intravenous Q5 Min PRN Jairon Perez DO       • niCARdipine (CARDENE) 25 mg/250 mL (0.1 mg/mL) 0.9% NS infusion  5-15 mg/hr Intravenous Titrated Jairon Perez DO 50 mL/hr at 07/10/17 0448 5 mg/hr at 07/10/17 0448   • ondansetron (ZOFRAN) injection 4 mg  4 mg Intravenous Q6H PRN Jairon Perez DO   4 mg at 07/09/17 1715   • piperacillin-tazobactam (ZOSYN) 3.375 g in iso-osmotic dextrose 50 ml  (premix)  3.375 g Intravenous Q6H Cristino Encinas DO 0 mL/hr at 07/12/17 0200 3.375 g at 07/14/17 0404   • potassium chloride (KAYCIEL) 20 MEQ/15ML (10%) solution 40 mEq  40 mEq Nasogastric BID Jairon Perez DO       • propofol (DIPRIVAN) infusion 10 mg/mL 100 mL  5-50 mcg/kg/min Intravenous Titrated Jairon Perez DO   Stopped at 07/13/17 1943   • sodium chloride 0.9 % flush 1-10 mL  1-10 mL Intravenous PRN Jairon Perez DO       • thiamine (B-1) 100 mg in sodium chloride 0.9 % 100 mL IVPB  100 mg Intravenous Daily Jairon Perez  mL/hr at 07/14/17 0809 100 mg at 07/14/17 0809   • ziprasidone (GEODON) injection 10 mg  10 mg Intramuscular Q6H PRN Cristino Encinas DO   10 mg at 07/13/17 1817       Review of Systems:   Could not obtain      Objective      Vital Signs  Temp:  [97.2 °F (36.2 °C)-97.8 °F (36.6 °C)] 97.2 °F (36.2 °C)  Heart Rate:  [41-88] 82  Resp:  [10-17] 14  BP: (148-218)/() 176/109  FiO2 (%):  [40 %] 40 %    Physical Exam:  Awake.  Trach in place  NC/AT, tongue remains swollen  Sclera anicteric  Coarse BS bilaterally  RRR  Abdomen distended  4+ edema in feet and hands    Neuro:  -Awake and answering questions appropriately.  Following commands.    --PERRL  --Positive Blink  --Moving all extremities against resistance.      Results Review:    I reviewed the patient's new clinical results.      Results from last 7 days  Lab Units 07/14/17  0257 07/12/17 0226 07/08/17  1057   WBC 10*3/mm3 16.14* 16.64* 12.30*   HEMOGLOBIN g/dL 10.9* 10.4* 10.4*   HEMATOCRIT % 32.8* 31.3* 31.5*   PLATELETS 10*3/mm3 400 349 199          Results from last 7 days  Lab Units 07/14/17  0257 07/12/17  0226 07/11/17  0142 07/10/17  0338   SODIUM mmol/L 147* 147* 147* 144   POTASSIUM mmol/L 2.9* 3.3* 3.7 4.1   CHLORIDE mmol/L 111* 106 109 111*   CO2 mmol/L 24.0 28.0 25.0 24.0   BUN mg/dL 24* 29* 21 13   CREATININE mg/dL 0.99 1.06 1.07 0.98   CALCIUM mg/dL 9.1 9.0 8.9 8.6   BILIRUBIN mg/dL  --   --   --   0.5   ALK PHOS U/L  --   --   --  155*   ALT (SGPT) U/L  --   --   --  42   AST (SGOT) U/L  --   --   --  51*   GLUCOSE mg/dL 105* 147* 143* 182*        Lab Results   Component Value Date    MG 2.1 07/06/2017     No components found for: POCGLUC  No components found for: A1C  Lab Results   Component Value Date    HDL 36 02/04/2016     No components found for: B12  No results found for: TSH     Labs reviewed    Assessment/Plan     Hospital Problem List    Active Problems:    Angio-edema    Seizures    HCAP (healthcare-associated pneumonia)    Impression  52 year old admitted with seizures, malignant HTN, PRES, EtOH w/d. Agitation, intubated, sedated, C.diff, low grade temps      Plan  1. PRES  --BPS remain somewhat elevated.  Primary team continues to titrate medications  --EEG showed no evidence of status. --On Keppra to 1000mg IV BID.  Will remain on this until MRI normal, recheck in 6 weeks  --No signs of CNS infection      2. DTs  --no signs of agitation.     3. C.diff  --On Flagyl      4. Tongue swelling: slowly improving    5.  GI:  Jevitiy 1.2 at 50/hr TF    6.  Resp:  I  --trach placed on 7/12 and tolerating well      Harpreet Arroyo MD  07/14/17  9:33 AM

## 2017-07-14 NOTE — PLAN OF CARE
Problem: Fall Risk (Adult)  Goal: Absence of Falls  Outcome: Ongoing (interventions implemented as appropriate)    07/14/17 1956   Fall Risk (Adult)   Absence of Falls making progress toward outcome

## 2017-07-14 NOTE — PLAN OF CARE
Problem: Patient Care Overview (Adult)  Goal: Discharge Needs Assessment  Outcome: Ongoing (interventions implemented as appropriate)    07/14/17 1600   Discharge Needs Assessment   Concerns To Be Addressed adjustment to diagnosis/illness concerns;cognitive/perceptual concerns;compliance issue concerns;substance/tobacco abuse/use concerns   Readmission Within The Last 30 Days no previous admission in last 30 days   Equipment Needed After Discharge feeding device;oxygen;nutrition supplies;respiratory supplies;trach supplies   Discharge Facility/Level Of Care Needs nursing facility, skilled   Current Discharge Risk substance abuse   Discharge Disposition still a patient   Current Health   Outpatient/Agency/Support Group Needs skilled nursing facility (specify)   Self-Care   Equipment Currently Used at Home none   Living Environment   Transportation Available family or friend will provide;car

## 2017-07-14 NOTE — SIGNIFICANT NOTE
Still on vent with thick secretions per RT. Should be coming off vent soon. RT recommended waiting until weekend to attempt PMV.     07/14/17 1330   Rehab Treatment   Discipline speech language pathologist   Rehab Evaluation   Evaluation Not Performed other (see comments)  (Still on vent with thick secretions per RT. Should be coming off vent soon.)   Daniela Heck CCC-SLP 7/14/2017 1:32 PM

## 2017-07-14 NOTE — PLAN OF CARE
Problem: Patient Care Overview (Adult)  Goal: Plan of Care Review  Outcome: Ongoing (interventions implemented as appropriate)    07/13/17 1307 07/14/17 0629   Coping/Psychosocial Response Interventions   Plan Of Care Reviewed With other (see comments)  (nursing) --    Patient Care Overview   Progress progress toward functional goals is gradual --    Outcome Evaluation   Outcome Summary/Follow up Plan --  pt very anxious wants to get up and walk and is very agitated closer to the morning, off of sedation meds, gave ativan once, VSS         Problem: Seizure Disorder/Epilepsy (Adult)  Goal: Signs and Symptoms of Listed Potential Problems Will be Absent or Manageable (Seizure Disorder/Epilepsy)  Outcome: Ongoing (interventions implemented as appropriate)    Problem: Fall Risk (Adult)  Goal: Absence of Falls  Outcome: Ongoing (interventions implemented as appropriate)    Problem: Pressure Ulcer Risk (Roman Scale) (Adult,Obstetrics,Pediatric)  Goal: Skin Integrity  Outcome: Ongoing (interventions implemented as appropriate)    Problem: SAFETY - NON-VIOLENT RESTRAINT  Goal: Remains free of injury from restraints (Non-Violent Restraint)  Outcome: Ongoing (interventions implemented as appropriate)  Goal: Free from restraint(s) (Non-Violent Restraint)  Outcome: Ongoing (interventions implemented as appropriate)    Problem: Nutrition, Enteral (Adult)  Goal: Signs and Symptoms of Listed Potential Problems Will be Absent or Manageable (Nutrition, Enteral)  Outcome: Ongoing (interventions implemented as appropriate)

## 2017-07-14 NOTE — PROGRESS NOTES
ENT/[facility] ([facility]) Progress Note:       LOS: 10 days   Patient Care Team:  Linda Hoffman DNP, EPI as PCP - General  Linda Hoffman DNP, APRN as PCP - Family Medicine    Active consulting complaint: Angioedema, improving    Subjective     Interval History:     Status of active consulting problem: Postop day 2 tracheostomy.  He is doing quite well, he denies shortness of breath.  His ventilating well.  His tongue swelling is down.    History taken from: patient    Review of Systems:    Review of Systems   HENT:        Tongue swelling has decreased.     Respiratory: Negative for shortness of breath and stridor.    Musculoskeletal: Negative for neck pain.   Psychiatric/Behavioral: Positive for agitation.       Objective     Vital Signs  Temp:  [97.2 °F (36.2 °C)-97.8 °F (36.6 °C)] 97.2 °F (36.2 °C)  Heart Rate:  [41-88] 74  Resp:  [10-17] 14  BP: (148-218)/() 176/109  FiO2 (%):  [40 %] 40 %    Physical Exam:   Physical Exam   Constitutional: He appears well-developed and well-nourished.   HENT:   Head: Normocephalic and atraumatic.   Right Ear: External ear normal.   Left Ear: External ear normal.   Nose: Nose normal.   Mouth/Throat: Oropharynx is clear and moist.   Tongue swelling has nearly resolved.  There is some residual ecchymosis.  Tongue fits in the dental arch well.   Eyes: EOM are normal.   Neck: Neck supple.   Tracheostomy tube in place and secure.  No bleeding.  No stridor.  Ventilating well   Pulmonary/Chest: Effort normal.   On vent   Neurological: He is alert. No cranial nerve deficit.   Psychiatric: His mood appears anxious.        Results Review:       Lab Results (last 24 hours)     Procedure Component Value Units Date/Time    Blood Gas, Arterial [[id number]]  (Abnormal) Collected:  07/14/17 0225    Specimen:  Arterial Blood Updated:  07/14/17 0229     Site Arterial: right radial     Gera's Test --      Documented in Rapid Comm        pH, Arterial 7.444 pH units      pCO2, Arterial 33.7  (L) mm Hg      pO2, Arterial 75.2 (L) mm Hg      HCO3, Arterial 22.6 mmol/L      Base Excess, Arterial -0.7 mmol/L      O2 Saturation, Arterial 95.7 %      O2 Saturation Calculated 95.7 %      Barometric Pressure for Blood Gas -- mmHg       Component not reported at this site.        Modality Ventilator     FIO2 40 %      Ventilator Mode Assist     Rate 10.0 Breaths/minute      PEEP 8.0     Vent CPAP/PEEP 8.0    Narrative:       Serial Number: [id number]    : [id number]    CBC & Differential [[id number]] Collected:  07/14/17 0257    Specimen:  Blood Updated:  07/14/17 0317    Narrative:       The following orders were created for panel order CBC & Differential.  Procedure                               Abnormality         Status                     ---------                               -----------         ------                     CBC Auto Differential[[id number]]        Abnormal            Final result                 Please view results for these tests on the individual orders.    CBC Auto Differential [[id number]]  (Abnormal) Collected:  07/14/17 0257    Specimen:  Blood Updated:  07/14/17 0317     WBC 16.14 (H) 10*3/mm3      RBC 3.64 (L) 10*6/mm3      Hemoglobin 10.9 (L) g/dL      Hematocrit 32.8 (L) %      MCV 90.1 fL      MCH 29.9 pg      MCHC 33.2 g/dL      RDW 14.5 %      RDW-SD 47.7 fl      MPV 9.8 fL      Platelets 400 10*3/mm3      Neutrophil % 68.5 %      Lymphocyte % 18.7 %      Monocyte % 8.0 %      Eosinophil % 0.6 %      Basophil % 0.2 %      Immature Grans % 4.0 %      Neutrophils, Absolute 11.04 (H) 10*3/mm3      Lymphocytes, Absolute 3.02 10*3/mm3      Monocytes, Absolute 1.29 10*3/mm3      Eosinophils, Absolute 0.10 10*3/mm3      Basophils, Absolute 0.04 10*3/mm3      Immature Grans, Absolute 0.65 (H) 10*3/mm3     Basic Metabolic Panel [[id number]]  (Abnormal) Collected:  07/14/17 0257    Specimen:  Blood Updated:  07/14/17 0337     Glucose 105 (H) mg/dL      BUN 24 (H) mg/dL      Creatinine 0.99  mg/dL      Sodium 147 (H) mmol/L      Potassium 2.9 (C) mmol/L      Chloride 111 (H) mmol/L      CO2 24.0 mmol/L      Calcium 9.1 mg/dL      eGFR Non African Amer 80 mL/min/1.73      BUN/Creatinine Ratio 24.2     Anion Gap 12.0 mmol/L     Narrative:       GFR Normal >60  Chronic Kidney Disease <60  Kidney Failure <15        Imaging Results (last 24 hours)     Procedure Component Value Units Date/Time    US Renal Artery Complete [[id number]] Collected:  07/12/17 2008     Updated:  07/13/17 1645    Narrative:       EXAMINATION: US RENAL ARTERY COMPLETE- 7/12/2017 8:08 PM CDT     HISTORY: Uncontrolled hypertension.     REPORT: Sonographic images of the kidneys were obtained, examination  includes Doppler analysis of the renal arteries, using color, gray scale  and spectral Doppler techniques were duplex study. There are no  comparison studies.     The right kidney measures 10.3 x 5.5 x 4.9 cm and has normal morphology  and echogenicity. There is no hydronephrosis. The tail flow velocities  are provided on the ultrasound worksheet, however the origin the right  renal artery is obscured. The mid right renal artery, the peak systolic  velocity measures 116.8 cm/s, this corresponds with the velocity ratio  compared to the abdominal aorta of 1.72. The resistive index measures  0.55. Velocities in the distal right renal arteries are normal.     Left kidney measures 11.5 x 6.7 x 5.3 cm and has normal morphology and  echogenicity, there is no hydronephrosis on the left. Doppler images are  limited for the left kidney, both the origin and left mid renal artery  are obscured. The PSV at the distal left renal artery measures 99.2 cm/s  with a velocity ratio compared to the abdominal aorta of 1.46 and  resistive index of 0.60. The bladder appears unremarkable.       Impression:       1. Normal morphological appearance of both kidneys.  2. Limited study with obscuration of the renal artery origins as well as  the mid left renal  artery, no Doppler evidence for significant renal  artery stenosis is seen however. If more detailed evaluation is  indicated clinically, CTA would be the best study.  This report was finalized on 07/12/2017 20:13 by Dr. Marvin Mar MD.    US Renal Bilateral [[id number]] Collected:  07/12/17 2008     Updated:  07/13/17 1645    Narrative:       EXAMINATION: US RENAL ARTERY COMPLETE- 7/12/2017 8:08 PM CDT     HISTORY: Uncontrolled hypertension.     REPORT: Sonographic images of the kidneys were obtained, examination  includes Doppler analysis of the renal arteries, using color, gray scale  and spectral Doppler techniques were duplex study. There are no  comparison studies.     The right kidney measures 10.3 x 5.5 x 4.9 cm and has normal morphology  and echogenicity. There is no hydronephrosis. The tail flow velocities  are provided on the ultrasound worksheet, however the origin the right  renal artery is obscured. The mid right renal artery, the peak systolic  velocity measures 116.8 cm/s, this corresponds with the velocity ratio  compared to the abdominal aorta of 1.72. The resistive index measures  0.55. Velocities in the distal right renal arteries are normal.     Left kidney measures 11.5 x 6.7 x 5.3 cm and has normal morphology and  echogenicity, there is no hydronephrosis on the left. Doppler images are  limited for the left kidney, both the origin and left mid renal artery  are obscured. The PSV at the distal left renal artery measures 99.2 cm/s  with a velocity ratio compared to the abdominal aorta of 1.46 and  resistive index of 0.60. The bladder appears unremarkable.       Impression:       1. Normal morphological appearance of both kidneys.  2. Limited study with obscuration of the renal artery origins as well as  the mid left renal artery, no Doppler evidence for significant renal  artery stenosis is seen however. If more detailed evaluation is  indicated clinically, CTA would be the best study.  This  report was finalized on 07/12/2017 20:13 by Dr. Marvin Mar MD.    XR Chest 1 View [[id number]] Collected:  07/14/17 0721     Updated:  07/14/17 0725    Narrative:       Frontal supine radiograph of the chest 7/14/2017 2:10 CST     History: respiratory failure, patient intubated.; R13.12-Dysphagia,  oropharyngeal phase; R56.9-Unspecified convulsions;  T78.3XXS-Angioneurotic edema, sequela; F10.231-Alcohol dependence with  withdrawal delirium; Z74.09-Other reduced mobility     Comparison: Chest x-ray dated 07/13/2017      Findings:   Right PICC in place terminating near the atrial caval junction. Left  subclavian catheter has been removed. Remaining lines and tubes are  stable in position. No new opacities or pneumothoraces are visualized in  the chest. The cardiomediastinal silhouette and pulmonary vascularity  are unchanged.       No acute osseous or soft tissue abnormality is noted.        Impression:       Impression:    No significant interval change since previous exam.        This report was finalized on 07/14/2017 07:22 by Dr. Martita Finnegan MD.          Medication Review:     Current Facility-Administered Medications   Medication Dose Route Frequency Provider Last Rate Last Dose   • acetaminophen (TYLENOL) tablet 650 mg  650 mg Oral Q4H PRN Jairon Perez DO   650 mg at 07/09/17 1003   • albuterol (PROVENTIL) nebulizer solution 0.083% 2.5 mg/3mL  2.5 mg Nebulization Q6H - RT Jairon Perez DO   2.5 mg at 07/14/17 0705   • amLODIPine (NORVASC) tablet 10 mg  10 mg Per G Tube Q24H Cristino Encinas DO   10 mg at 07/14/17 0809   • carvedilol (COREG) tablet 25 mg  25 mg Oral Q12H Jairon Perez DO       • chlorhexidine (PERIDEX) 0.12 % solution 15 mL  15 mL Mouth/Throat Q12H Cristino Encinas DO   15 mL at 07/14/17 0809   • CloNIDine (CATAPRES-TTS) 0.3 MG/24HR patch 1 patch  1 patch Transdermal Weekly Cristino Encinas DO   1 patch at 07/11/17 1423   • dextromethorphan polistirex ER (DELSYM) 30  MG/5ML oral suspension 30 mg  30 mg Oral BID Robe Hiwot Willian, DO   30 mg at 07/14/17 0809   • diazePAM (VALIUM) tablet 2 mg  2 mg Nasogastric Q8H Jairon Perez,        • enoxaparin (LOVENOX) syringe 40 mg  40 mg Subcutaneous Daily Cristino Encinas, DO   40 mg at 07/14/17 0810   • famotidine (PEPCID) tablet 20 mg  20 mg Per G Tube Q12H Cristino Encinas, DO   20 mg at 07/14/17 0809   • HYDROcodone-acetaminophen (NORCO)  MG per tablet 1 tablet  1 tablet Oral Q4H PRN Cristino Encinas, DO   1 tablet at 07/03/17 1758   • HYDROcodone-acetaminophen (NORCO) 5-325 MG per tablet 1 tablet  1 tablet Oral Q4H PRN Cristino Encinas, DO       • HYDROmorphone (DILAUDID) injection 1 mg  1 mg Intravenous Q3H PRN Cristino Encinas, DO   1 mg at 07/14/17 0808   • ibuprofen (ADVIL,MOTRIN) 100 MG/5ML suspension 200 mg  200 mg Oral Q6H PRN Cristino Encinas, DO   200 mg at 07/09/17 1342   • levETIRAcetam in NaCl 0.75% (KEPPRA) IVPB 1,000 mg  1,000 mg Intravenous Q12H Royal Campos MD 0 mL/hr at 07/09/17 0930 1,000 mg at 07/14/17 0809   • levoFLOXacin (LEVAQUIN) 750 mg/150 mL D5W (premix) (LEVAQUIN) 750 mg  750 mg Intravenous Q24H Cristino Encinas DO   750 mg at 07/13/17 1141   • LORazepam (ATIVAN) tablet 1 mg  1 mg Oral Q2H PRN Cristino Encinas DO        Or   • LORazepam (ATIVAN) injection 1 mg  1 mg Intravenous Q2H PRN Cristino Encinas DO        Or   • LORazepam (ATIVAN) tablet 2 mg  2 mg Oral Q1H PRN Cristino Encinas DO        Or   • LORazepam (ATIVAN) injection 2 mg  2 mg Intravenous Q1H PRN Cristino Encinas, DO   2 mg at 07/13/17 1009    Or   • LORazepam (ATIVAN) injection 2 mg  2 mg Intravenous Q15 Min PRN Cristino Encinas, DO        Or   • LORazepam (ATIVAN) injection 2 mg  2 mg Intramuscular Q15 Min PRN Cristino Encinas, DO        Or   • LORazepam (ATIVAN) tablet 4 mg  4 mg Oral Q1H PRN Cristino Encinas DO        Or   • LORazepam (ATIVAN) injection 4 mg  4 mg Intravenous Q1H PRN Cristino  JANNETH Encinas, DO   4 mg at 07/13/17 1457   • LORazepam (ATIVAN) injection 1 mg  1 mg Intravenous Q8H PRN Erwin Glass MD   1 mg at 07/13/17 2310   • losartan (COZAAR) tablet 50 mg  50 mg Oral Q24H Jairon Perez DO       • magnesium sulfate in D5W 1g/100mL (PREMIX)  1 g Intravenous Once Jairon Perez DO       • metroNIDAZOLE (FLAGYL) tablet 500 mg  500 mg Per G Tube Q8H Cristino Encinas DO   500 mg at 07/14/17 0625   • naloxone (NARCAN) injection 0.4 mg  0.4 mg Intravenous Q5 Min PRN Jairon Perez DO       • niCARdipine (CARDENE) 25 mg/250 mL (0.1 mg/mL) 0.9% NS infusion  5-15 mg/hr Intravenous Titrated Jairon Perez DO 50 mL/hr at 07/10/17 0448 5 mg/hr at 07/10/17 0448   • ondansetron (ZOFRAN) injection 4 mg  4 mg Intravenous Q6H PRN Jairon Perez DO   4 mg at 07/09/17 1715   • piperacillin-tazobactam (ZOSYN) 3.375 g in iso-osmotic dextrose 50 ml (premix)  3.375 g Intravenous Q6H Cristino Encinas DO 0 mL/hr at 07/12/17 0200 3.375 g at 07/14/17 0404   • potassium chloride (KAYCIEL) 20 MEQ/15ML (10%) solution 40 mEq  40 mEq Nasogastric BID Jairon Perez DO       • propofol (DIPRIVAN) infusion 10 mg/mL 100 mL  5-50 mcg/kg/min Intravenous Titrated Jairon Perez DO   Stopped at 07/13/17 1943   • sodium chloride 0.9 % flush 1-10 mL  1-10 mL Intravenous PRN Jairon Perez DO       • thiamine (B-1) 100 mg in sodium chloride 0.9 % 100 mL IVPB  100 mg Intravenous Daily Jairon Perez  mL/hr at 07/14/17 0809 100 mg at 07/14/17 0809   • ziprasidone (GEODON) injection 10 mg  10 mg Intramuscular Q6H PRN Cristino Encinas DO   10 mg at 07/13/17 1814       Assessment/Plan     Active Problems:    Angio-edema    Seizures    HCAP (healthcare-associated pneumonia)    Continuing to wean from the vent.  Continue current tracheostomy care.  Should the swelling not recur, will consider decannulation.            Jairon Pierson MD  07/14/17  9:15 AM

## 2017-07-14 NOTE — PLAN OF CARE
Problem: Patient Care Overview (Adult)  Goal: Plan of Care Review  Outcome: Ongoing (interventions implemented as appropriate)    07/14/17 0921   Coping/Psychosocial Response Interventions   Plan Of Care Reviewed With patient   Patient Care Overview   Progress progress toward functional goals is gradual   Outcome Evaluation   Outcome Summary/Follow up Plan Pt. participated with exercising all 4 extremeties. Tolerated well.

## 2017-07-14 NOTE — THERAPY TREATMENT NOTE
Acute Care - Physical Therapy Treatment Note  [facility]     Patient Name: Nishant Savage  : 1965  MRN: [id number]  Today's Date: 2017  Onset of Illness/Injury or Date of Surgery Date: 17  Date of Referral to PT: 17  Referring Physician: Dr. Encinas    Admit Date: 7/3/2017    Visit Dx:    ICD-10-CM ICD-9-CM   1. Oropharyngeal dysphagia R13.12 787.22   2. Seizures R56.9 780.39   3. Angioedema, sequela T78.3XXS 909.9   4. Alcohol withdrawal, with delirium F10.231 291.0   5. Impaired mobility Z74.09 799.89     Patient Active Problem List   Diagnosis   • Hyperlipidemia   • HTN (hypertension)   • Gout   • Anxiety   • Arthritis   • Erectile dysfunction   • Angio-edema   • Seizures   • HCAP (healthcare-associated pneumonia)               Adult Rehabilitation Note       17 0854 17 0823 17 0836    Rehab Assessment/Intervention    Discipline physical therapy assistant  -SUKHDEEP physical therapy assistant  - physical therapy assistant  -    Document Type therapy note (daily note)  - therapy note (daily note)  - therapy note (daily note)  -    Subjective Information no complaints;agree to therapy  -SUKHDEEP agree to therapy  -SUKHDEEP unable to respond  -    Precautions/Limitations oxygen therapy device and L/min   vent, restraints, NG tube  -SUKHDEEP oxygen therapy device and L/min   vent, restraints,c-diff  -SUKHDEEP oxygen therapy device and L/min   -vent, restraints, c-diff  -MF    Recorded by [SUKHDEEP] Britt Fuller PTA [SUKHDEEP] Britt Fuller PTA [] Uma Restrepo PTA    Vital Signs    Pretreatment Heart Rate (beats/min)  50  -SUKHDEEP     Posttreatment Heart Rate (beats/min)  55  -SUKHDEEP     Pre SpO2 (%)  96  -SUKHDEEP     Intra SpO2 (%)  99  -SUKHDEEP     Post SpO2 (%)  100  -SUKHDEEP     Recorded by  [SUKHDEEP] Britt Fuller PTA     Pain Assessment    Pain Assessment No/denies pain  -SUKHDEEP No/denies pain  -SUKHDEEP Rivera-Narvaez FACES  -MF    Rivera-Baker FACES Pain Rating   0  -MF    Recorded by [SUKHDEEP] Britt Fuller PTA [SUKHDEEP]  Britt Fuller PTA [MF] Uma Restrepo PTA    Therapy Exercises    Bilateral Lower Extremities AAROM:;AROM:;20 reps;supine;ankle pumps/circles;heel slides;hip abduction/adduction;hip ER;hip IR;SAQ  -SUKHDEEP PROM:;AAROM:;20 reps;supine;ankle pumps/circles;heel slides;hip abduction/adduction;hip ER;hip IR;SLR   10 SLR  -SUKHDEEP PROM:;20 reps;supine;ankle pumps/circles;calf stretch;heel slides;hip abduction/adduction;SLR;hip IR  -MF    Bilateral Upper Extremity AAROM:;AROM:;15 reps;20 reps;supine;elbow flexion/extension;hand pumps;shoulder abduction/adduction;shoulder extension/flexion;shoulder ER/IR;shoulder horizontal abd/add  -SUKHDEEP PROM:;AAROM:;20 reps;supine;elbow flexion/extension;hand pumps;shoulder abduction/adduction;shoulder extension/flexion;shoulder ER/IR;shoulder horizontal abd/add  -SUKHDEEP PROM:;20 reps;supine;elbow flexion/extension;hand pumps;shoulder abduction/adduction;shoulder extension/flexion   while sedation paused, pt. squeezed MD's hand  -MF    Recorded by [SUKHDEEP] Britt Fuller PTA [SUKHDEEP] Britt Fuller PTA [MF] Uma Restrepo PTA    Positioning and Restraints    Pre-Treatment Position in bed  - in bed  - in bed  -    Post Treatment Position bed  - bed  - bed  -    In Bed fowlers;call light within reach;encouraged to call for assist;with nsg;side rails up x3;RUE elevated;LUE elevated;SCD pump applied  - fowlers;call light within reach;encouraged to call for assist;with nsg;side rails up x3;RUE elevated;LUE elevated;SCD pump applied  - fowlers;call light within reach;patient within staff view;notified nsg;side rails up x2;SCD pump applied;LUE elevated;RUE elevated;L heel elevated;R heel elevated  -    Restraints released:;reapplied:;soft limb  -SUKHDEEP released:;reapplied:;soft limb  -SUKHDEEP released:;reapplied:;notified nsg:  -MF    Recorded by [SUKHDEEP] Britt Fuller, PTA [SUKHDEEP] Britt Fuller, PTA [MF] Uma Restrepo, NATALIE      User Key  (r) = Recorded By, (t) = Taken By, (c) =  Cosigned By    Initials Name Effective Dates     Britt JEMMA Fuller, PTA 08/02/16 -      Uma Restrepo, PTA 08/02/16 -                 IP PT Goals       07/10/17 0810          Bed Mobility PT LTG    Bed Mobility PT LTG, Date Established 07/10/17  -MARJORIE      Bed Mobility PT LTG, Time to Achieve by discharge  -MARJORIE      Bed Mobility PT LTG, Activity Type roll left/roll right  -MARJORIE      Bed Mobility PT LTG, [place] Level moderate assist (50% patient effort)  -MARJORIE      Bed Mobility PT Goal  LTG, Assist Device bed rails  -MARJORIE      Strength Goal PT LTG    Strength Goal PT LTG, Date Established 07/10/17  -MARJORIE      Strength Goal PT LTG, Time to Achieve by discharge  -MARJORIE      Strength Goal PT LTG, Measure to Achieve AAROM/AROM, B UE/LE, 10-20 reps, min assist  -MARJORIE      Physical Therapy PT LTG    Physical Therapy PT LTG, Date Established 07/10/17  -MARJORIE      Physical Therapy PT LTG, Time to Achieve by discharge  -MARJORIE      Physical Therapy PT LTG, Activity Type No new evidence of skin breakdown or contractures while pt. on the vent.   -MARJORIE        User Key  (r) = Recorded By, (t) = Taken By, (c) = Cosigned By    Initials Name Provider Type    MARJORIE Olsen, PT DPT Physical Therapist          Physical Therapy Education     Title: PT OT SLP Therapies (Active)     Topic: Physical Therapy (Active)     Point: Mobility training (Done)    Learning Progress Summary    Learner Readiness Method Response Comment Documented by Status   Patient Acceptance E,D DU Benefits of activity;ex's  07/14/17 0920 Done    Acceptance E,D NR Benefit of activities; ex's  07/13/17 0847 Active    Nonacceptance E NL Pt. sedated on vent, no evidence of learning.  07/11/17 0905 Active               Point: Home exercise program (Active)    Learning Progress Summary    Learner Readiness Method Response Comment Documented by Status   Patient Nonacceptance E NL Pt. sedated on vent, no evidence of learning.  07/12/17 0837 Active    Nonacceptance E NL  Pt. sedated on vent, no evidence of learning.  07/11/17 0905 Active    Acceptance E NR Educated pt on progression of PT POC and benefits of activity  07/10/17 0810 Active               Point: Body mechanics (Active)    Learning Progress Summary    Learner Readiness Method Response Comment Documented by Status   Patient Nonacceptance E NL Pt. sedated on vent, no evidence of learning.  07/11/17 0905 Active               Point: Precautions (Active)    Learning Progress Summary    Learner Readiness Method Response Comment Documented by Status   Patient Nonacceptance E NL Pt. sedated on vent, no evidence of learning.  07/11/17 0905 Active                      User Key     Initials Effective Dates Name Provider Type Discipline     08/02/16 -  Jaime Olsen, PT DPT Physical Therapist PT     08/02/16 -  Britt Fuller, PTA Physical Therapy Assistant PT     08/02/16 -  Uma Restrepo, PTA Physical Therapy Assistant PT                    PT Recommendation and Plan  Anticipated Discharge Disposition:  (unknown while on the vent)  Planned Therapy Interventions: (S) bed mobility training, balance training, home exercise program, patient/family education, ROM (Range of Motion), strengthening (PT will re-eval to update POC/goals once extubated. )  PT Frequency: daily, 2 times/day  Plan of Care Review  Plan Of Care Reviewed With: patient  Progress: progress toward functional goals is gradual  Outcome Summary/Follow up Plan: Pt. participated with exercising all 4 extremeties. Tolerated well.          Outcome Measures       07/14/17 0900 07/13/17 0800 07/12/17 0836    How much help from another person do you currently need...    Turning from your back to your side while in flat bed without using bedrails? 2  -SUKHDEEP 1  -SUKHDEEP 1  -MF    Moving from lying on back to sitting on the side of a flat bed without bedrails? 2  -SUKHDEEP 1  -SUKHDEEP 1  -MF    Moving to and from a bed to a chair (including a wheelchair)? 2  -SUKHDEEP 1  -SUKHDEEP 1   -MF    Standing up from a chair using your arms (e.g., wheelchair, bedside chair)? 2  -SUKHDEEP 1  -SUKHDEEP 1  -MF    Climbing 3-5 steps with a railing? 1  -SUKHDEEP 1  -SUKHDEEP 1  -MF    To walk in hospital room? 1  -SUKHDEEP 1  -SUKHDEEP 1  -MF    AM-PAC 6 Clicks Score 10  -SUKHDEEP 6  -SUKHDEEP 6  -MF    Functional Assessment    Outcome Measure Options   AM-PAC 6 Clicks Basic Mobility (PT)  -MF      User Key  (r) = Recorded By, (t) = Taken By, (c) = Cosigned By    Initials Name Provider Type    SUKHDEEP Fuller PTA Physical Therapy Assistant    MULU Restrepo PTA Physical Therapy Assistant           Time Calculation:       Therapy Charges for Today     Code Description Service Date Service Provider Modifiers Qty    21697259034 HC PT THER PROC EA 15 MIN 7/13/2017 Britt Fuller PTA GP 2          PT G-Codes  Outcome Measure Options: AM-PAC 6 Clicks Basic Mobility (PT)  Score: 6  Functional Limitation: Mobility: Walking and moving around  Mobility: Walking and Moving Around Current Status (): 100 percent impaired, limited or restricted  Mobility: Walking and Moving Around Goal Status (): At least 80 percent but less than 100 percent impaired, limited or restricted    Britt Fuller PTA  7/14/2017

## 2017-07-14 NOTE — PROGRESS NOTES
Nutrition Services    Patient Name:  Nishant Savage  YOB: 1965  MRN: [id number]  Admit Date:  7/3/2017    Nutrition follow up. Per discussion with nursing pt has had abdominal distention/cramping. TF held since last pm. Discussed with nursing recommend restart Vital AF 1.2 at 15 ml/hr for 8 hrs, then advance as tolerated by 10 ml/hr every 6 hrs to goal rate of 50 ml/hr. If unable to tolerate my benefit from GI consult and or for parenteral nutrition. Cont to follow.      Electronically signed by:  Cherise Chaidez MS,RDN,LD  07/14/17 1:13 PM

## 2017-07-14 NOTE — PLAN OF CARE
Problem: Patient Care Overview (Adult)  Goal: Plan of Care Review  Outcome: Ongoing (interventions implemented as appropriate)    07/14/17 1604   Coping/Psychosocial Response Interventions   Plan Of Care Reviewed With patient;family   Patient Care Overview   Progress improving   Outcome Evaluation   Outcome Summary/Follow up Plan patient has trach and has been removed from mechanical ventilation as of 1600. he is tolerating off the vent, is still in soft restraints, as he pulled his external catheter off and his NG tube out this morning. NG was replaced and patient is using the urinal with assistance. he is communicating with boards and dry erase markers. Stool output has slowed tremendously today, but is still loose.

## 2017-07-14 NOTE — PROGRESS NOTES
Continued Stay Note   [facility]     Patient Name: Nishant Savage  MRN: [id number]  Today's Date: 7/14/2017    Admit Date: 7/3/2017          Discharge Plan       07/14/17 1414    Case Management/Social Work Plan    Plan LTAC precert pending    Patient/Family In Agreement With Plan yes    Additional Comments Insurance precert pending for LTAC placement.              Discharge Codes     None        Expected Discharge Date and Time     Expected Discharge Date Expected Discharge Time    Jul 7, 2017             HENRIETTA Nava

## 2017-07-14 NOTE — PLAN OF CARE
Problem: Nutrition, Enteral (Adult)  Goal: Signs and Symptoms of Listed Potential Problems Will be Absent or Manageable (Nutrition, Enteral)  Outcome: Ongoing (interventions implemented as appropriate)    07/14/17 1557   Nutrition, Enteral   Problems Assessed (Enteral Nutrition) all   Problems Present (Enteral Nutrition) diarrhea;situational response;abdominal distension

## 2017-07-15 ENCOUNTER — APPOINTMENT (OUTPATIENT)
Dept: GENERAL RADIOLOGY | Facility: HOSPITAL | Age: 52
End: 2017-07-15

## 2017-07-15 LAB
ANION GAP SERPL CALCULATED.3IONS-SCNC: 11 MMOL/L (ref 4–13)
ARTERIAL PATENCY WRIST A: NORMAL
ATMOSPHERIC PRESS: NORMAL MMHG
BASE EXCESS BLDA CALC-SCNC: -0.4 MMOL/L (ref -2–2)
BDY SITE: NORMAL
BUN BLD-MCNC: 18 MG/DL (ref 5–21)
BUN/CREAT SERPL: 20 (ref 7–25)
CALCIUM SPEC-SCNC: 8.8 MG/DL (ref 8.4–10.4)
CHLORIDE SERPL-SCNC: 110 MMOL/L (ref 98–110)
CO2 SERPL-SCNC: 25 MMOL/L (ref 24–31)
CREAT BLD-MCNC: 0.9 MG/DL (ref 0.5–1.4)
DEPRECATED RDW RBC AUTO: 47 FL (ref 40–54)
ERYTHROCYTE [DISTWIDTH] IN BLOOD BY AUTOMATED COUNT: 14.4 % (ref 12–15)
GAS FLOW AIRWAY: 35 LPM
GFR SERPL CREATININE-BSD FRML MDRD: 89 ML/MIN/1.73
GLUCOSE BLD-MCNC: 93 MG/DL (ref 70–100)
HCO3 BLDA-SCNC: 23.2 MMOL/L (ref 22–26)
HCT VFR BLD AUTO: 33.8 % (ref 40–52)
HGB BLD-MCNC: 11.2 G/DL (ref 14–18)
MAGNESIUM SERPL-MCNC: 2.1 MG/DL (ref 1.4–2.2)
MCH RBC QN AUTO: 29.8 PG (ref 28–32)
MCHC RBC AUTO-ENTMCNC: 33.1 G/DL (ref 33–36)
MCV RBC AUTO: 89.9 FL (ref 82–95)
MODALITY: NORMAL
PCO2 BLDA: 35.4 MM HG (ref 35–45)
PH BLDA: 7.43 PH UNITS (ref 7.35–7.45)
PLATELET # BLD AUTO: 366 10*3/MM3 (ref 130–400)
PMV BLD AUTO: 9.8 FL (ref 6–12)
PO2 BLDA: 85.1 MM HG (ref 80–100)
POTASSIUM BLD-SCNC: 3.4 MMOL/L (ref 3.5–5.3)
RBC # BLD AUTO: 3.76 10*6/MM3 (ref 4.8–5.9)
SAO2 % BLDCOA: 96.8 % (ref 94–100)
SAO2 % BLDCOA: 96.8 % (ref 94–100)
SODIUM BLD-SCNC: 146 MMOL/L (ref 135–145)
WBC NRBC COR # BLD: 16.11 10*3/MM3 (ref 4.8–10.8)

## 2017-07-15 PROCEDURE — 63710000001 FLINTSTONES COMPLETE 60 MG CHEWABLE TABLET: Performed by: INTERNAL MEDICINE

## 2017-07-15 PROCEDURE — 94760 N-INVAS EAR/PLS OXIMETRY 1: CPT

## 2017-07-15 PROCEDURE — 36600 WITHDRAWAL OF ARTERIAL BLOOD: CPT

## 2017-07-15 PROCEDURE — 94003 VENT MGMT INPAT SUBQ DAY: CPT

## 2017-07-15 PROCEDURE — G8997 SWALLOW GOAL STATUS: HCPCS | Performed by: SPEECH-LANGUAGE PATHOLOGIST

## 2017-07-15 PROCEDURE — 25010000002 ENOXAPARIN PER 10 MG: Performed by: FAMILY MEDICINE

## 2017-07-15 PROCEDURE — 82803 BLOOD GASES ANY COMBINATION: CPT

## 2017-07-15 PROCEDURE — 80048 BASIC METABOLIC PNL TOTAL CA: CPT | Performed by: FAMILY MEDICINE

## 2017-07-15 PROCEDURE — 25010000002 LEVOFLOXACIN PER 250 MG: Performed by: FAMILY MEDICINE

## 2017-07-15 PROCEDURE — G8996 SWALLOW CURRENT STATUS: HCPCS | Performed by: SPEECH-LANGUAGE PATHOLOGIST

## 2017-07-15 PROCEDURE — 92610 EVALUATE SWALLOWING FUNCTION: CPT | Performed by: SPEECH-LANGUAGE PATHOLOGIST

## 2017-07-15 PROCEDURE — 94799 UNLISTED PULMONARY SVC/PX: CPT

## 2017-07-15 PROCEDURE — 85027 COMPLETE CBC AUTOMATED: CPT | Performed by: FAMILY MEDICINE

## 2017-07-15 PROCEDURE — 25010000002 HYDRALAZINE PER 20 MG: Performed by: INTERNAL MEDICINE

## 2017-07-15 PROCEDURE — 83735 ASSAY OF MAGNESIUM: CPT | Performed by: INTERNAL MEDICINE

## 2017-07-15 PROCEDURE — 71010 HC CHEST PA OR AP: CPT

## 2017-07-15 PROCEDURE — 25010000002 PIPERACILLIN SOD-TAZOBACTAM PER 1 G: Performed by: FAMILY MEDICINE

## 2017-07-15 RX ORDER — HYDRALAZINE HYDROCHLORIDE 20 MG/ML
10 INJECTION INTRAMUSCULAR; INTRAVENOUS EVERY 6 HOURS PRN
Status: DISCONTINUED | OUTPATIENT
Start: 2017-07-15 | End: 2017-07-19 | Stop reason: HOSPADM

## 2017-07-15 RX ORDER — THIAMINE MONONITRATE (VIT B1) 100 MG
100 TABLET ORAL DAILY
Status: DISCONTINUED | OUTPATIENT
Start: 2017-07-15 | End: 2017-07-16

## 2017-07-15 RX ORDER — POTASSIUM CHLORIDE 750 MG/1
40 CAPSULE, EXTENDED RELEASE ORAL 2 TIMES DAILY WITH MEALS
Status: DISCONTINUED | OUTPATIENT
Start: 2017-07-15 | End: 2017-07-18

## 2017-07-15 RX ADMIN — ALBUTEROL SULFATE 2.5 MG: 2.5 SOLUTION RESPIRATORY (INHALATION) at 12:02

## 2017-07-15 RX ADMIN — LEVETIRACETAM 1000 MG: 100 SOLUTION ORAL at 21:14

## 2017-07-15 RX ADMIN — ALBUTEROL SULFATE 2.5 MG: 2.5 SOLUTION RESPIRATORY (INHALATION) at 06:50

## 2017-07-15 RX ADMIN — LEVOFLOXACIN 750 MG: 5 INJECTION, SOLUTION INTRAVENOUS at 10:58

## 2017-07-15 RX ADMIN — CARVEDILOL 25 MG: 25 TABLET, FILM COATED ORAL at 21:14

## 2017-07-15 RX ADMIN — TAZOBACTAM SODIUM AND PIPERACILLIN SODIUM 3.38 G: 375; 3 INJECTION, SOLUTION INTRAVENOUS at 04:11

## 2017-07-15 RX ADMIN — ENOXAPARIN SODIUM 40 MG: 40 INJECTION SUBCUTANEOUS at 08:48

## 2017-07-15 RX ADMIN — CHLORHEXIDINE GLUCONATE 15 ML: 1.2 RINSE ORAL at 11:57

## 2017-07-15 RX ADMIN — LEVETIRACETAM 1000 MG: 100 SOLUTION ORAL at 11:56

## 2017-07-15 RX ADMIN — ALBUTEROL SULFATE 2.5 MG: 2.5 SOLUTION RESPIRATORY (INHALATION) at 00:01

## 2017-07-15 RX ADMIN — Medication 100 MG: at 11:55

## 2017-07-15 RX ADMIN — AMLODIPINE BESYLATE 10 MG: 10 TABLET ORAL at 11:58

## 2017-07-15 RX ADMIN — DIAZEPAM 2 MG: 2 TABLET ORAL at 21:14

## 2017-07-15 RX ADMIN — TAZOBACTAM SODIUM AND PIPERACILLIN SODIUM 3.38 G: 375; 3 INJECTION, SOLUTION INTRAVENOUS at 09:35

## 2017-07-15 RX ADMIN — DEXTROMETHORPHAN 30 MG: 30 SUSPENSION, EXTENDED RELEASE ORAL at 16:36

## 2017-07-15 RX ADMIN — METRONIDAZOLE 500 MG: 500 TABLET, FILM COATED ORAL at 13:12

## 2017-07-15 RX ADMIN — METRONIDAZOLE 500 MG: 500 TABLET, FILM COATED ORAL at 21:14

## 2017-07-15 RX ADMIN — HYDRALAZINE HYDROCHLORIDE 10 MG: 20 INJECTION INTRAMUSCULAR; INTRAVENOUS at 10:58

## 2017-07-15 RX ADMIN — Medication 1 TABLET: at 11:55

## 2017-07-15 RX ADMIN — DEXTROMETHORPHAN 30 MG: 30 SUSPENSION, EXTENDED RELEASE ORAL at 11:56

## 2017-07-15 RX ADMIN — FAMOTIDINE 20 MG: 20 TABLET, FILM COATED ORAL at 21:14

## 2017-07-15 RX ADMIN — LOSARTAN POTASSIUM 50 MG: 50 TABLET ORAL at 11:58

## 2017-07-15 RX ADMIN — POTASSIUM CHLORIDE 40 MEQ: 750 CAPSULE, EXTENDED RELEASE ORAL at 13:12

## 2017-07-15 RX ADMIN — DIAZEPAM 2 MG: 2 TABLET ORAL at 13:12

## 2017-07-15 RX ADMIN — POTASSIUM CHLORIDE 40 MEQ: 20 SOLUTION ORAL at 11:56

## 2017-07-15 RX ADMIN — FAMOTIDINE 20 MG: 20 TABLET, FILM COATED ORAL at 11:57

## 2017-07-15 RX ADMIN — ALBUTEROL SULFATE 2.5 MG: 2.5 SOLUTION RESPIRATORY (INHALATION) at 19:40

## 2017-07-15 RX ADMIN — TAZOBACTAM SODIUM AND PIPERACILLIN SODIUM 3.38 G: 375; 3 INJECTION, SOLUTION INTRAVENOUS at 21:23

## 2017-07-15 RX ADMIN — TAZOBACTAM SODIUM AND PIPERACILLIN SODIUM 3.38 G: 375; 3 INJECTION, SOLUTION INTRAVENOUS at 16:34

## 2017-07-15 RX ADMIN — CARVEDILOL 25 MG: 25 TABLET, FILM COATED ORAL at 11:55

## 2017-07-15 RX ADMIN — HYDROCODONE BITARTRATE AND ACETAMINOPHEN 1 TABLET: 10; 325 TABLET ORAL at 15:50

## 2017-07-15 NOTE — THERAPY EVALUATION
Acute Care - Speech Language Pathology   Swallow Initial Evaluation [facility]     Patient Name: Nishant Savage  : 1965  MRN: [id number]  Today's Date: 7/15/2017  Onset of Illness/Injury or Date of Surgery Date: 17     Referring Physician: Dr. Zamarripa      Admit Date: 7/3/2017    SPEECH-LANGUAGE PATHOLOGY EVALUATION - SWALLOW  Subjective: The patient was seen on this date for a Clinical Swallow evaluation.  Patient was alert and cooperative.    The patient's history is significant for trach and vent now extubated and pulled DHT.   Objective: Textures given included thin liquid, nectar thick liquid, honey thick liquid, puree consistency and regular consistency.  Assessment: Difficulties were noted with none of the above consistencies, characterized by no overt s/s of aspiration, however will be more cautious with patient due to trach tube, just extubated and recent history.  SLP Findings:  Patient presents with minimal swallow, without esophageal component.   Recommendations: Diet Textures: nectar thick liquid, mechanical soft consistency food.  Medications should be taken whole with thickened liquids or puree. May have water and ice between meals after oral care, under staff or family supervision and with the recommended strategies for safe swallowing.   Recommended Strategies: Upright for PO and small bites and sips. Oral care before breakfast, after all meals and PRN.  Other Recommended Evaluations: PMV     Dysphagia therapy is recommended.   Daniela Bentley MS CCC-SLP 7/15/2017 12:05 PM    Visit Dx:     ICD-10-CM ICD-9-CM   1. Oropharyngeal dysphagia R13.12 787.22   2. Seizures R56.9 780.39   3. Angioedema, sequela T78.3XXS 909.9   4. Alcohol withdrawal, with delirium F10.231 291.0   5. Impaired mobility Z74.09 799.89     Patient Active Problem List   Diagnosis   • Hyperlipidemia   • HTN (hypertension)   • Gout   • Anxiety   • Arthritis   • Erectile dysfunction   • Angio-edema   • Seizures   • HCAP  (healthcare-associated pneumonia)     Past Medical History:   Diagnosis Date   • Anxiety    • Arthritis    • Gout    • HTN (hypertension)    • Hyperlipidemia      Past Surgical History:   Procedure Laterality Date   • TRACHEOSTOMY N/A 7/12/2017    Procedure: TRACHEOSTOMY WITH TRACHEOSCOPY;  Surgeon: Jairon Pierson MD;  Location: [facility];  Service:           SWALLOW EVALUATION (last 72 hours)      Swallow Evaluation       07/15/17 1130                Rehab Evaluation    Document Type evaluation  -KW        Subjective Information no complaints  -KW        General Information    Patient Profile Review yes  -KW        Pertinent History Of Current Problem Trach tube, on vent, DHT now pulled.  -KW        Current Diet Limitations NPO  -KW        Precautions/Limitations, Vision WFL  -KW        Precautions/Limitations, Hearing WFL  -KW        Prior Level of Function- Communication functional in all spheres  -KW        Prior Level of Function- Swallowing no diet consistency restrictions  -KW        Plans/Goals Discussed With patient;family  -KW        Barriers to Rehab none identified  -KW        Clinical Impression    Patient's Goals For Discharge return to all previous roles/activities  -KW        Family Goals For Discharge patient able to return to all previous activities/roles  -KW        SLP Swallowing Diagnosis mild dysphagia  -KW        Rehab Potential/Prognosis, Swallowing good, to achieve stated therapy goals  -KW        Criteria for Skilled Therapeutic Interventions Met demonstrates skilled criteria for intervention  -KW        FCM, Swallowing 4-->Level 4  -KW        Therapy Frequency 3-5 times/wk  -KW        Predicted Duration Therapy Interv (days) until discharge  -KW        Expected Duration Therapy Session (min) 15-30 minutes  -KW        SLP Diet Recommendation soft textures;ground;nectar/syrup-thick liquids  -KW        Recommended Feeding/Eating Techniques maintain upright posture during/after eating for 30  mins;small sips/bites  -KW        SLP Rec. for Method of Medication Administration meds whole with thickened liquid;meds whole in pudding/applesauce  -KW        Monitor For Signs Of Aspiration pneumonia;right lower lobe infiltrates  -KW        Anticipated Discharge Disposition home with assist  -KW        Pain Assessment    Pain Assessment No/denies pain  -KW        Oral Motor Structure and Function    Oral Motor Anatomy and Physiology patient demonstrates anatomy and physiology that is WNL  -KW        Dentition Assessment present and adequate  -KW        Secretion Management WNL/WFL  -KW        Mucosal Quality moist, healthy  -KW        Velar Elevation WFL (within functional limits)  -KW        Volitional Swallow no difficulties initiating volitional swallow  -KW        Volitional Cough no difficulties initiating volitional cough  -KW        Oral Musculature General Assessment WFL (within functional limits)  -KW        General Feeding/Swallowing Observations    Current Feeding Method NPO  -KW        Respiratory Support Currently in Use tracheostomy in place  -KW        Observations of Self Feeding Skills appropriate self feeding skills observed  -KW        Clinical Swallow Exam    Mode of Presentation fed by clinician;spoon;self fed;cup  -KW        Oral Phase Results intact oral phase without signs of dysfunction  -KW        Pharyngeal Phase Results susupected impaired pharyngeal phase of swallowing  -KW        Summary of Clinical Exam Swallow evaluation completed.  Cuff deflated, no cough wtih cuff deflation.  Able to acheive some voicing.  No overt s/s of aspiration with full range of consistencies, however with hx and trach tube will be more cautious until PMV placed and able to gain more accurate view with voicing. Recommend: 1) Mech soft diet with nectar thick liquids 2) Water between meals 3) PMV trials with SLP hopefully tomorrow.  -KW        Swallow Recommendations    Other Recommendations mechanical  soft;nectar thick;water between meals  -KW          User Key  (r) = Recorded By, (t) = Taken By, (c) = Cosigned By    Initials Name Effective Dates    KW Daniela CATALAN Nelsonsheila, MS [facility]-SLP 08/02/16 -         EDUCATION  The patient has been educated in the following areas:   Dysphagia (Swallowing Impairment).    SLP Recommendation and Plan  SLP Swallowing Diagnosis: mild dysphagia  SLP Diet Recommendation: soft textures, ground, nectar/syrup-thick liquids  Recommended Feeding/Eating Techniques: maintain upright posture during/after eating for 30 mins, small sips/bites  SLP Rec. for Method of Medication Administration: meds whole with thickened liquid, meds whole in pudding/applesauce  Monitor For Signs Of Aspiration: pneumonia, right lower lobe infiltrates     Criteria for Skilled Therapeutic Interventions Met: demonstrates skilled criteria for intervention  Anticipated Discharge Disposition: home with assist  Rehab Potential/Prognosis, Swallowing: good, to achieve stated therapy goals  Therapy Frequency: 3-5 times/wk             Plan of Care Review  Plan Of Care Reviewed With: patient, family, caregiver  Progress: improving  Outcome Summary/Follow up Plan: Swallow evaluation completed. Cuff deflated, no cough wtih cuff deflation. Able to acheive some voicing. No overt s/s of aspiration with full range of consistencies, however with hx and trach tube will be more cautious until PMV placed and able to gain more accurate view with voicing. Recommend: 1) Mech soft diet with nectar thick liquids 2) Water between meals 3) PMV trials with SLP hopefully tomorrow.          IP SLP Goals       07/15/17 1157 07/05/17 0926 07/03/17 1027    Safely Consume Diet    Safely Consume Diet- SLP, Date Established 07/15/17  -KW  07/03/17  -CS    Safely Consume Diet- SLP, Time to Achieve by discharge  -KW  by discharge  -CS    Safely Consume Diet- SLP, Additional Goal Patient will tolerate upgraded PO trials with SLP w/ no s/s of aspiration.  -KW   Pt will tolerate recommended diet as well as trials of upgraded diet consistencies w/o any overt s/s of aspiration.  -CS    Safely Consume Diet- SLP, Date Goal Reviewed 07/15/17  -KW 07/05/17  -KW     Safely Consume Diet- SLP, Outcome  goal no longer appropriate  -KW goal ongoing  -CS    Safely Consume Diet- SLP, Reason Goal Not Met  medical status inhibits participation  -KW       User Key  (r) = Recorded By, (t) = Taken By, (c) = Cosigned By    Initials Name Provider Type    ANASTACIO Bentley MS CCC-SLP Speech and Language Pathologist    CS Kevin Zacarias MS CCC-SLP Speech and Language Pathologist             SLP Outcome Measures (last 72 hours)      SLP Outcome Measures       07/15/17 1100          SLP Outcome Measures    Outcome Measure Used? Adult NOMS  -      FCM Scores    FCM Chosen Swallowing  -      Swallowing FCM Score 4  -KW        User Key  (r) = Recorded By, (t) = Taken By, (c) = Cosigned By    Initials Name Effective Dates    ANASTACIO Bentley MS CCC-LAEXIA 08/02/16 -            Time Calculation:         Time Calculation- SLP       07/15/17 1204          Time Calculation- SLP    SLP Start Time 1130  -KW      SLP Stop Time 1204  -KW      SLP Time Calculation (min) 34 min  -KW      SLP Received On 07/15/17  -KW      SLP Goal Re-Cert Due Date 07/25/17  -KW        User Key  (r) = Recorded By, (t) = Taken By, (c) = Cosigned By    Initials Name Provider Type    ANASTACIO Bentley MS CCC-SLP Speech and Language Pathologist          Therapy Charges for Today     Code Description Service Date Service Provider Modifiers Qty    60849659301 HC ST SWALLOWING CURRENT STATUS 7/15/2017 Daniela Bentley MS CCC-SLP GN, CK 1    29369361011 HC ST SWALLOWING PROJECTED 7/15/2017 MS LEONIDES Patino GN, CI 1    08788161238 HC ST EVAL ORAL PHARYNG SWALLOW 2 7/15/2017 MS LEONIDES Patino GN 1          SLP G-Codes  SLP NOMS Used?: Yes  Functional Limitations: Swallowing  Swallow Current Status (): At least  40 percent but less than 60 percent impaired, limited or restricted  Swallow Goal Status (): At least 1 percent but less than 20 percent impaired, limited or restricted    Daniela Bentley MS CCC-SLP  7/15/2017

## 2017-07-15 NOTE — PLAN OF CARE
Problem: Patient Care Overview (Adult)  Goal: Plan of Care Review  Outcome: Ongoing (interventions implemented as appropriate)    07/15/17 0502   Coping/Psychosocial Response Interventions   Plan Of Care Reviewed With patient   Patient Care Overview   Progress improving   Outcome Evaluation   Outcome Summary/Follow up Plan Patient tolerating being off the vent very well. Oxygen saturation adequate all night. Patient pulled out NG tube while restrained. Placed on bed pan several times through the night.        Goal: Adult Individualization and Mutuality  Outcome: Ongoing (interventions implemented as appropriate)  Goal: Discharge Needs Assessment  Outcome: Ongoing (interventions implemented as appropriate)    Problem: Seizure Disorder/Epilepsy (Adult)  Goal: Signs and Symptoms of Listed Potential Problems Will be Absent or Manageable (Seizure Disorder/Epilepsy)  Outcome: Ongoing (interventions implemented as appropriate)    Problem: Fall Risk (Adult)  Goal: Absence of Falls  Outcome: Ongoing (interventions implemented as appropriate)    Problem: Pressure Ulcer Risk (Roman Scale) (Adult,Obstetrics,Pediatric)  Goal: Skin Integrity  Outcome: Ongoing (interventions implemented as appropriate)    Problem: SAFETY - NON-VIOLENT RESTRAINT  Goal: Remains free of injury from restraints (Non-Violent Restraint)  Outcome: Ongoing (interventions implemented as appropriate)  Goal: Free from restraint(s) (Non-Violent Restraint)  Outcome: Ongoing (interventions implemented as appropriate)    Problem: Nutrition, Enteral (Adult)  Goal: Signs and Symptoms of Listed Potential Problems Will be Absent or Manageable (Nutrition, Enteral)  Outcome: Ongoing (interventions implemented as appropriate)

## 2017-07-15 NOTE — PROGRESS NOTES
[facility]  INPATIENT PROGRESS NOTE    Length of Stay: 11  Date of Admission: 7/3/2017  Primary Care Physician: Linda Hoffman, DNP, APRN    Subjective   Chief Complaint: alcohol withdrawal complicated by hypertension and respiratory failure  HPI   He was removed from ventilatory support yesterday afternoon and has done well since then.  He actually has good saturations on room air right now.    He is doing so much better today.  He actually is up and out of bed.  He is writing intelligible and thoughtful notes on a notebook pad.  He is able to converse with plugging his tracheostomy.  He wants to eat.    Review of Systems   Difficult given his tracheostomy status, but he is cooperative and communicative today.     Objective    Temp:  [97.1 °F (36.2 °C)-98 °F (36.7 °C)] 97.3 °F (36.3 °C)  Heart Rate:  [65-97] 87  Resp:  [14-21] 16  BP: (115-177)/() 158/94  FiO2 (%):  [40 %] 40 %  Physical Exam  Constitutional: No distress.   Seen and discussed with his nurse, Sadia. No family present at the moment. He is calm, polite, conversant with writing and plugging his tracheostomy. PICC.     Head: Normocephalic and atraumatic.   Eyes: Pupils are equal, round, and reactive to light.   Neck: Neck supple. No JVD present.   Cardiovascular: Normal rate and regular rhythm.   Pulmonary/Chest: Effort normal and breath sounds normal. Ventilated.    Abdominal: Soft. Bowel sounds are normal.   Musculoskeletal: He exhibits edema (trace).   Neurological: He is appropriate. He is ambulatory with assistance.   Skin: Skin is warm and dry.     Intake/Output Summary (Last 24 hours) at 07/15/17 0819  Last data filed at 07/15/17 0400   Gross per 24 hour   Intake           1576.7 ml   Output              525 ml   Net           1051.7 ml     Results Review:  I have reviewed the labs, radiology results, and diagnostic studies.    Laboratory Data:     Results from last 7 days  Lab  Units 07/15/17  0143 07/14/17  0257 07/12/17  0226   WBC 10*3/mm3 16.11* 16.14* 16.64*   HEMOGLOBIN g/dL 11.2* 10.9* 10.4*   HEMATOCRIT % 33.8* 32.8* 31.3*   PLATELETS 10*3/mm3 366 400 349       Results from last 7 days  Lab Units 07/15/17  0143 07/14/17  0257 07/12/17  0226  07/10/17  0338   SODIUM mmol/L 146* 147* 147*  < > 144   POTASSIUM mmol/L 3.4* 2.9* 3.3*  < > 4.1   CHLORIDE mmol/L 110 111* 106  < > 111*   CO2 mmol/L 25.0 24.0 28.0  < > 24.0   BUN mg/dL 18 24* 29*  < > 13   CREATININE mg/dL 0.90 0.99 1.06  < > 0.98   CALCIUM mg/dL 8.8 9.1 9.0  < > 8.6   BILIRUBIN mg/dL  --   --   --   --  0.5   ALK PHOS U/L  --   --   --   --  155*   ALT (SGPT) U/L  --   --   --   --  42   AST (SGOT) U/L  --   --   --   --  51*   GLUCOSE mg/dL 93 105* 147*  < > 182*   < > = values in this interval not displayed.    Radiology Data:   Imaging Results (last 24 hours)     Procedure Component Value Units Date/Time    XR Abdomen KUB [[id number]] Collected:  07/14/17 1254     Updated:  07/14/17 1257    Narrative:       EXAMINATION:   XR ABDOMEN KUB-  7/14/2017 12:54 PM CDT     HISTORY: NG tube placement     Single view the abdomen is obtained. Nasogastric tube is present  satisfactorily position in the fundus the stomach.       Impression:       Satisfactory placement nasogastric tube  This report was finalized on 07/14/2017 12:54 by Dr. Adolph Echols MD.    XR Chest 1 View [[id number]] Updated:  07/15/17 0351    XR Chest 1 View [[id number]] Updated:  07/15/17 0352        I have reviewed the patient current medications.     Assessment/Plan   Assessment:   1. Possible angioedema related to lisinopril.  2. Malignant hypertension.  3. Possible new onset seizure disorder versus convulsive syncope.  4. Posterior reversible encephalopathy syndrome on MRI.  5. Tongue bite with sublingual hematoma.  6. Tobacco abuse.  7. Obstructive sleep apnea, noncompliant with device.  8. Gouty arthritis.  9. Daily alcohol use with Delirium tremens  requiring sedation and mechanical ventilation.  10. Hypokalemia.   11. Clostridium difficile colitis.  12. Possible lingual or sublingual infection, MSSA on culture.  13. Bilateral lower lobe infiltrates consistent with ventilator associated pneumonia combined with atelectasis.      Plan:  Difficult to control blood pressure at times. Changed metoprolol to carvedilol on 7/14. Placed on losartan on 7/14. There was some question as to whether or not he had tongue swelling on lisinopril. However, angiotensin receptor blockers should be okay as he needs these for blood pressure control. Continue amlodipine and clonidine patch.  His blood pressure seems to be improving with these changes.  I would like to follow him on a full day of these medications before making any additional adjustments.     He will remain on Keppra for at least 6-8 weeks per the neurology service.      He has been on broad-spectrum antibiotics for potential pneumonia since the 11th. The Staph in his sputum is MSSA. Discontinued vancomycin on 7/14. Continue with Zosyn and Levaquin. He is also completing Flagyl Clostridium difficile toxin present in his stool.     Pulmonology seperated from ventilatory support during the afternoon of 7/14.      Dr. Encinas placed him on as needed Geodon for agitation. He should not need this any longer. I placed him back on a small dose of scheduled Valium as well on 7/14.     Replace potassium aggressively. Recent magnesium level is 2.1.    Speech therapy consultation.      Discharge Planning: Possible LTAC placement with pre-cert pending. He may not require this given his vast improvement over the last 24 hours.     Jairon Perez, DO   07/15/17   8:18 AM

## 2017-07-15 NOTE — PROGRESS NOTES
PULMONARY AND CRITICAL CARE PROGRESS NOTE - [facility]    Patient: Nishant Savage    1965    MR# [id number]    Acct# [id number]  07/15/17   1:06 PM  Referring Provider: Jairon Perez DO    Chief Complaint: S/p tracheostomy     Interval history: He continues to make progress, he's been off the vent since yesterday and tolerating well. He continues to want to do more activity and nursing has been encouraged to get him up in the chair today. No other aggravating or allevitating factors.       Meds:    albuterol 2.5 mg Nebulization Q6H - RT   amLODIPine 10 mg Per G Tube Q24H   carvedilol 25 mg Oral Q12H   chlorhexidine 15 mL Mouth/Throat Q12H   CloNIDine 1 patch Transdermal Weekly   dextromethorphan polistirex ER 30 mg Oral BID   diazePAM 2 mg Nasogastric Q8H   enoxaparin 40 mg Subcutaneous Daily   famotidine 20 mg Per G Tube Q12H   flintstones complete 1 tablet Oral Daily   levETIRAcetam 1,000 mg Per G Tube Q12H   levoFLOXacin 750 mg Intravenous Q24H   losartan 50 mg Oral Q24H   metroNIDAZOLE 500 mg Per G Tube Q8H   piperacillin-tazobactam 3.375 g Intravenous Q6H   potassium chloride 40 mEq Oral BID With Meals   thiamine 100 mg Oral Daily       niCARdipine 5-15 mg/hr Last Rate: 5 mg/hr (07/10/17 0448)     Review of Systems:   Cannot fully obtain due to tracheostomy but he is trying to communicate and has a cooperative attitude.     Physical Exam:  Temp:  [97.1 °F (36.2 °C)-98 °F (36.7 °C)] 97.8 °F (36.6 °C)  Heart Rate:  [65-97] 85  Resp:  [14-21] 21  BP: (115-177)/() 172/105  FiO2 (%):  [40 %] 40 %    Intake/Output Summary (Last 24 hours) at 07/15/17 1306  Last data filed at 07/15/17 1200   Gross per 24 hour   Intake           1694.7 ml   Output              325 ml   Net           1369.7 ml     SpO2 Readings from Last 3 Encounters:   07/15/17 100%   09/30/16 98%   02/04/16 96%     Physical Exam   Gen: Awake and alert, following commands, indicating that he wants to get up out of bed.      HEENT: Normocephalic and atraumatic. Tongue edema is improved.   Neck: Supple, trach in place.  Respiratory: Fair air movement bilaterally. No distress.   Cardiac: regular rate and rhythm in the 60's. B/P remains very elevated. No murmur or gallop noted.   Abdomen: distended, soft, bowel sounds present.   Neurologic: He is alert and following all commands.   Psychiatric: Fairly calm, trying to sit up.    Dermatologic: No rash noted.  Extremities: +generalized edema, SCDs are in place.  Musculoskeletal: No joint deformities noted.  Lymphatic: No adenopathy palpated    Results from last 7 days  Lab Units 07/15/17  0143 07/14/17  0257 07/12/17  0226   WBC 10*3/mm3 16.11* 16.14* 16.64*   HEMOGLOBIN g/dL 11.2* 10.9* 10.4*   PLATELETS 10*3/mm3 366 400 349       Results from last 7 days  Lab Units 07/15/17  0143 07/14/17  0257 07/12/17  0226   SODIUM mmol/L 146* 147* 147*   POTASSIUM mmol/L 3.4* 2.9* 3.3*   BUN mg/dL 18 24* 29*   CREATININE mg/dL 0.90 0.99 1.06       Results from last 7 days  Lab Units 07/15/17  0217 07/14/17  0225 07/13/17  0317 07/12/17  0310   PH, ARTERIAL pH units 7.435 7.444 7.441 7.498*   PCO2, ARTERIAL mm Hg 35.4 33.7* 38.8 36.6   PO2 ART mm Hg 85.1 75.2* 105.3* 76.8*   FIO2 %  --  40 40 40        Recent films:  Imaging Results (last 24 hours)     Procedure Component Value Units Date/Time    XR Chest 1 View [[id number]] Collected:  07/15/17 1012     Updated:  07/15/17 1017    Narrative:       HISTORY: Respiratory failure, intubated     CXR: A frontal view the chest is obtained.     Comparison: 07/14/2017     Findings: Tracheostomy tube is appropriate in position. The prior  enteric tube has been removed. A new right upper extremity PICC line is  in place with the tip near the atrial caval junction.     There is a diminished level of inspiration. There is mild elevation of  the right hemidiaphragm. There are left lower lobe opacities. There is a  questionable small left pleural effusion. There is no  pneumothorax. The  cardiomediastinal contours are similar       Impression:       1. Removal of the enteric tube with placement of a right upper extremity  PICC line. Tracheostomy tube appropriate in position.  2. Left lower lobe opacities may be patchy atelectasis. Consolidation  considered.      This report was finalized on 07/15/2017 10:14 by Dr. Sri Rivera MD.        Films reviewed personally by me.  My interpretation: trach in place, suspect atelectasis over consolidation.     Pulmonary Assessment:  1. Respiratory failure related to agitation from ethanol withdrawal.  2. Malignant HTN-persistent  3. History of chronic ethanol use  4. C-diff   5. Tongue hematoma-improving  6. MSSA respiratory culture    7. S/p tracheostomy placement -7-12-17   -  Recommend:   · Speech eval completed with plan for mech soft diet with nectar thick liquids and H2O between meals.   · Can proceed with PMV trials when speech feels he's ready  · Out of bed today  · Continue nebs.   · He's making excellent progress daily at this point.     Electronically signed by EPI Lincoln on 7/15/2017 at 1:06 PM    Physician substantive contribution:  Pertinent symptoms/interval history include: still with some diarrhea.  No increased shortness of breath  Respiratory exam shows pertinent findings of:diminished breath sounds, stable, no accessory muscle use.  Off vent.  Plan includes: mobilize, advance ST activity.  I have seen and examined patient personally, performing a face-to-face diagnostic evaluation with plan of care reviewed and developed with APRN and nursing staff. I have addended and/or modified the above history of present illness, physical examination, and assessment and plan to reflect my findings and impressions. Essential elements of the care plan were discussed with APRN above.  Agree with findings and assessment/plan as documented above.    Electronically signed by Gonzalo Petersen MD, on 7/15/2017, 4:55  PM

## 2017-07-15 NOTE — PLAN OF CARE
Problem: Patient Care Overview (Adult)  Goal: Plan of Care Review  Outcome: Ongoing (interventions implemented as appropriate)    07/15/17 1157   Coping/Psychosocial Response Interventions   Plan Of Care Reviewed With patient;family;caregiver   Patient Care Overview   Progress improving   Outcome Evaluation   Outcome Summary/Follow up Plan Swallow evaluation completed. Cuff deflated, no cough wtih cuff deflation. Able to acheive some voicing. No overt s/s of aspiration with full range of consistencies, however with hx and trach tube will be more cautious until PMV placed and able to gain more accurate view with voicing. Recommend: 1) Mech soft diet with nectar thick liquids 2) Water between meals 3) PMV trials with SLP hopefully tomorrow.         Problem: Inpatient SLP  Goal: Dysphagia- Patient will safely consume diet as per recommendation with no signs/symptoms of aspiration  Outcome: Ongoing (interventions implemented as appropriate)    07/15/17 1157   Safely Consume Diet   Safely Consume Diet- SLP, Date Established 07/15/17   Safely Consume Diet- SLP, Time to Achieve by discharge   Safely Consume Diet- SLP, Additional Goal Patient will tolerate upgraded PO trials with SLP w/ no s/s of aspiration.   Safely Consume Diet- SLP, Date Goal Reviewed 07/15/17

## 2017-07-16 ENCOUNTER — APPOINTMENT (OUTPATIENT)
Dept: ULTRASOUND IMAGING | Facility: HOSPITAL | Age: 52
End: 2017-07-16

## 2017-07-16 LAB
ANION GAP SERPL CALCULATED.3IONS-SCNC: 10 MMOL/L (ref 4–13)
BUN BLD-MCNC: 18 MG/DL (ref 5–21)
BUN/CREAT SERPL: 18.9 (ref 7–25)
CALCIUM SPEC-SCNC: 8.8 MG/DL (ref 8.4–10.4)
CHLORIDE SERPL-SCNC: 110 MMOL/L (ref 98–110)
CO2 SERPL-SCNC: 23 MMOL/L (ref 24–31)
CREAT BLD-MCNC: 0.95 MG/DL (ref 0.5–1.4)
DEPRECATED RDW RBC AUTO: 46.8 FL (ref 40–54)
ERYTHROCYTE [DISTWIDTH] IN BLOOD BY AUTOMATED COUNT: 14.4 % (ref 12–15)
GFR SERPL CREATININE-BSD FRML MDRD: 84 ML/MIN/1.73
GLUCOSE BLD-MCNC: 131 MG/DL (ref 70–100)
HCT VFR BLD AUTO: 34 % (ref 40–52)
HGB BLD-MCNC: 11.3 G/DL (ref 14–18)
MCH RBC QN AUTO: 29.4 PG (ref 28–32)
MCHC RBC AUTO-ENTMCNC: 33.2 G/DL (ref 33–36)
MCV RBC AUTO: 88.5 FL (ref 82–95)
PLATELET # BLD AUTO: 357 10*3/MM3 (ref 130–400)
PMV BLD AUTO: 9.8 FL (ref 6–12)
POTASSIUM BLD-SCNC: 3.6 MMOL/L (ref 3.5–5.3)
RBC # BLD AUTO: 3.84 10*6/MM3 (ref 4.8–5.9)
SODIUM BLD-SCNC: 143 MMOL/L (ref 135–145)
WBC NRBC COR # BLD: 19.16 10*3/MM3 (ref 4.8–10.8)

## 2017-07-16 PROCEDURE — G8979 MOBILITY GOAL STATUS: HCPCS

## 2017-07-16 PROCEDURE — G8978 MOBILITY CURRENT STATUS: HCPCS

## 2017-07-16 PROCEDURE — 93970 EXTREMITY STUDY: CPT

## 2017-07-16 PROCEDURE — 92526 ORAL FUNCTION THERAPY: CPT | Performed by: SPEECH-LANGUAGE PATHOLOGIST

## 2017-07-16 PROCEDURE — 25010000002 ENOXAPARIN PER 10 MG: Performed by: FAMILY MEDICINE

## 2017-07-16 PROCEDURE — 97116 GAIT TRAINING THERAPY: CPT

## 2017-07-16 PROCEDURE — 63710000001 FLINTSTONES COMPLETE 60 MG CHEWABLE TABLET: Performed by: INTERNAL MEDICINE

## 2017-07-16 PROCEDURE — G9175 SPEECH LANG GOAL STATUS: HCPCS | Performed by: SPEECH-LANGUAGE PATHOLOGIST

## 2017-07-16 PROCEDURE — 97164 PT RE-EVAL EST PLAN CARE: CPT

## 2017-07-16 PROCEDURE — 94760 N-INVAS EAR/PLS OXIMETRY 1: CPT

## 2017-07-16 PROCEDURE — 25010000002 PIPERACILLIN SOD-TAZOBACTAM PER 1 G: Performed by: FAMILY MEDICINE

## 2017-07-16 PROCEDURE — 85027 COMPLETE CBC AUTOMATED: CPT | Performed by: FAMILY MEDICINE

## 2017-07-16 PROCEDURE — 92597 ORAL SPEECH DEVICE EVAL: CPT | Performed by: SPEECH-LANGUAGE PATHOLOGIST

## 2017-07-16 PROCEDURE — G9176 SPEECH LANG D/C STATUS: HCPCS | Performed by: SPEECH-LANGUAGE PATHOLOGIST

## 2017-07-16 PROCEDURE — G9174 SPEECH LANG CURRENT STATUS: HCPCS | Performed by: SPEECH-LANGUAGE PATHOLOGIST

## 2017-07-16 PROCEDURE — 94799 UNLISTED PULMONARY SVC/PX: CPT

## 2017-07-16 PROCEDURE — 80048 BASIC METABOLIC PNL TOTAL CA: CPT | Performed by: FAMILY MEDICINE

## 2017-07-16 PROCEDURE — L8501 TRACHEOSTOMY SPEAKING VALVE: HCPCS | Performed by: SPEECH-LANGUAGE PATHOLOGIST

## 2017-07-16 RX ORDER — LEVOFLOXACIN 750 MG/1
750 TABLET ORAL EVERY 24 HOURS
Status: DISCONTINUED | OUTPATIENT
Start: 2017-07-16 | End: 2017-07-18

## 2017-07-16 RX ORDER — LEVETIRACETAM 500 MG/1
1000 TABLET ORAL EVERY 12 HOURS SCHEDULED
Status: DISCONTINUED | OUTPATIENT
Start: 2017-07-16 | End: 2017-07-19 | Stop reason: HOSPADM

## 2017-07-16 RX ADMIN — DEXTROMETHORPHAN 30 MG: 30 SUSPENSION, EXTENDED RELEASE ORAL at 17:32

## 2017-07-16 RX ADMIN — HYDROCODONE BITARTRATE AND ACETAMINOPHEN 1 TABLET: 5; 325 TABLET ORAL at 00:36

## 2017-07-16 RX ADMIN — FAMOTIDINE 20 MG: 20 TABLET, FILM COATED ORAL at 08:41

## 2017-07-16 RX ADMIN — POTASSIUM CHLORIDE 40 MEQ: 750 CAPSULE, EXTENDED RELEASE ORAL at 08:42

## 2017-07-16 RX ADMIN — LEVETIRACETAM 1000 MG: 500 TABLET, FILM COATED ORAL at 20:29

## 2017-07-16 RX ADMIN — FAMOTIDINE 20 MG: 20 TABLET, FILM COATED ORAL at 20:29

## 2017-07-16 RX ADMIN — HYDROCODONE BITARTRATE AND ACETAMINOPHEN 1 TABLET: 5; 325 TABLET ORAL at 20:28

## 2017-07-16 RX ADMIN — TAZOBACTAM SODIUM AND PIPERACILLIN SODIUM 3.38 G: 375; 3 INJECTION, SOLUTION INTRAVENOUS at 04:31

## 2017-07-16 RX ADMIN — ALBUTEROL SULFATE 2.5 MG: 2.5 SOLUTION RESPIRATORY (INHALATION) at 11:45

## 2017-07-16 RX ADMIN — ALBUTEROL SULFATE 2.5 MG: 2.5 SOLUTION RESPIRATORY (INHALATION) at 23:50

## 2017-07-16 RX ADMIN — ENOXAPARIN SODIUM 40 MG: 40 INJECTION SUBCUTANEOUS at 08:42

## 2017-07-16 RX ADMIN — LOSARTAN POTASSIUM 50 MG: 50 TABLET ORAL at 08:42

## 2017-07-16 RX ADMIN — HYDROCODONE BITARTRATE AND ACETAMINOPHEN 1 TABLET: 5; 325 TABLET ORAL at 04:28

## 2017-07-16 RX ADMIN — CARVEDILOL 25 MG: 25 TABLET, FILM COATED ORAL at 20:29

## 2017-07-16 RX ADMIN — METRONIDAZOLE 500 MG: 500 TABLET, FILM COATED ORAL at 14:10

## 2017-07-16 RX ADMIN — AMLODIPINE BESYLATE 10 MG: 10 TABLET ORAL at 08:42

## 2017-07-16 RX ADMIN — DIAZEPAM 2 MG: 2 TABLET ORAL at 21:10

## 2017-07-16 RX ADMIN — METRONIDAZOLE 500 MG: 500 TABLET, FILM COATED ORAL at 07:10

## 2017-07-16 RX ADMIN — DIAZEPAM 2 MG: 2 TABLET ORAL at 07:10

## 2017-07-16 RX ADMIN — Medication 1 TABLET: at 17:32

## 2017-07-16 RX ADMIN — METRONIDAZOLE 500 MG: 500 TABLET, FILM COATED ORAL at 21:10

## 2017-07-16 RX ADMIN — DEXTROMETHORPHAN 30 MG: 30 SUSPENSION, EXTENDED RELEASE ORAL at 08:42

## 2017-07-16 RX ADMIN — ALBUTEROL SULFATE 2.5 MG: 2.5 SOLUTION RESPIRATORY (INHALATION) at 01:14

## 2017-07-16 RX ADMIN — ALBUTEROL SULFATE 2.5 MG: 2.5 SOLUTION RESPIRATORY (INHALATION) at 07:02

## 2017-07-16 RX ADMIN — CARVEDILOL 25 MG: 25 TABLET, FILM COATED ORAL at 08:42

## 2017-07-16 RX ADMIN — DIAZEPAM 2 MG: 2 TABLET ORAL at 14:10

## 2017-07-16 RX ADMIN — LEVOFLOXACIN 750 MG: 750 TABLET, FILM COATED ORAL at 09:22

## 2017-07-16 RX ADMIN — HYDROCODONE BITARTRATE AND ACETAMINOPHEN 1 TABLET: 10; 325 TABLET ORAL at 14:10

## 2017-07-16 RX ADMIN — POTASSIUM CHLORIDE 40 MEQ: 750 CAPSULE, EXTENDED RELEASE ORAL at 17:31

## 2017-07-16 RX ADMIN — LEVETIRACETAM 1000 MG: 500 TABLET, FILM COATED ORAL at 09:22

## 2017-07-16 NOTE — PROGRESS NOTES
"    [facility]  INPATIENT PROGRESS NOTE    Length of Stay: 12  Date of Admission: 7/3/2017  Primary Care Physician: Linda Hoffman, DNP, APRN    Subjective   Chief Complaint: weakness  HPI   His mom says he is \"back to normal.\" He Tells me that he feels very well.  His blood pressure has improved.  He was cleared for a diet by speech therapy and has eaten well.    He complains of symmetrical pain to his bilateral lower extremities on the tops of his feet in the extensor surfaces of his legs.  I explained to he and his mother this is likely related to being in a moribound state recently.  He needs to increase activity and conduct physical therapy.    Review of Systems   All pertinent negatives and positives are as above. All other systems have been reviewed and are negative unless otherwise stated.     Objective    Temp:  [97.6 °F (36.4 °C)-99.4 °F (37.4 °C)] 97.6 °F (36.4 °C)  Heart Rate:  [78-95] 87  Resp:  [15-24] 15  BP: (103-176)/() 139/89  Physical Exam  Constitutional: No distress.   Seen and discussed with his nurse, Trinity. His mother is present. He is calm, polite, conversant with plugging his tracheostomy. PICC.    Head: Normocephalic and atraumatic.   Eyes: Pupils are equal, round, and reactive to light.   Neck: Neck supple. No JVD present. Trach clean.   Cardiovascular: Normal rate and regular rhythm.   Pulmonary/Chest: Effort normal and breath sounds normal. On room air via trach.    Abdominal: Soft. Bowel sounds are normal.   Musculoskeletal: He exhibits edema (trace).   Neurological: He is appropriate. He is ambulatory with assistance.   Skin: Skin is warm and dry.     Results Review:  I have reviewed the labs, radiology results, and diagnostic studies.    Laboratory Data:     Results from last 7 days  Lab Units 07/16/17  0230 07/15/17  0143 07/14/17  0257   WBC 10*3/mm3 19.16* 16.11* 16.14*   HEMOGLOBIN g/dL 11.3* 11.2* 10.9*   HEMATOCRIT % " 34.0* 33.8* 32.8*   PLATELETS 10*3/mm3 357 366 400       Results from last 7 days  Lab Units 07/16/17  0230 07/15/17  0143 07/14/17  0257  07/10/17  0338   SODIUM mmol/L 143 146* 147*  < > 144   POTASSIUM mmol/L 3.6 3.4* 2.9*  < > 4.1   CHLORIDE mmol/L 110 110 111*  < > 111*   CO2 mmol/L 23.0* 25.0 24.0  < > 24.0   BUN mg/dL 18 18 24*  < > 13   CREATININE mg/dL 0.95 0.90 0.99  < > 0.98   CALCIUM mg/dL 8.8 8.8 9.1  < > 8.6   BILIRUBIN mg/dL  --   --   --   --  0.5   ALK PHOS U/L  --   --   --   --  155*   ALT (SGPT) U/L  --   --   --   --  42   AST (SGOT) U/L  --   --   --   --  51*   GLUCOSE mg/dL 131* 93 105*  < > 182*   < > = values in this interval not displayed.    Radiology Data:   Imaging Results (last 24 hours)     Procedure Component Value Units Date/Time    XR Chest 1 View [[id number]] Collected:  07/15/17 1012     Updated:  07/15/17 1017    Narrative:       HISTORY: Respiratory failure, intubated     CXR: A frontal view the chest is obtained.     Comparison: 07/14/2017     Findings: Tracheostomy tube is appropriate in position. The prior  enteric tube has been removed. A new right upper extremity PICC line is  in place with the tip near the atrial caval junction.     There is a diminished level of inspiration. There is mild elevation of  the right hemidiaphragm. There are left lower lobe opacities. There is a  questionable small left pleural effusion. There is no pneumothorax. The  cardiomediastinal contours are similar       Impression:       1. Removal of the enteric tube with placement of a right upper extremity  PICC line. Tracheostomy tube appropriate in position.  2. Left lower lobe opacities may be patchy atelectasis. Consolidation  considered.      This report was finalized on 07/15/2017 10:14 by Dr. Sri Rivera MD.        I have reviewed the patient current medications.     Assessment/Plan   Assessment:   1. Possible angioedema related to lisinopril.  2. Malignant hypertension.  3. Possible new  onset seizure disorder versus convulsive syncope.  4. Posterior reversible encephalopathy syndrome on MRI.  5. Tongue bite with sublingual hematoma.  6. Tobacco abuse.  7. Obstructive sleep apnea, noncompliant with device.  8. Gouty arthritis.  9. Daily alcohol use with Delirium tremens requiring sedation and mechanical ventilation.  10. Hypokalemia.   11. Clostridium difficile colitis.  12. Possible lingual or sublingual infection, MSSA on culture.  13. Bilateral lower lobe infiltrates consistent with ventilator associated pneumonia combined with atelectasis.      Plan:  His blood pressure seems much improved over last 24 hours.  Still a few hypertensive numbers, but overall doing much better.  Changed metoprolol to carvedilol on 7/14. Placed on losartan on 7/14. There was some question as to whether or not he had tongue swelling on lisinopril. However, angiotensin receptor blockers should be okay as he needs these for blood pressure control. Continue amlodipine and clonidine patch.        He will remain on Keppra for at least 6-8 weeks per the neurology service.       He has been on broad-spectrum antibiotics for potential pneumonia since the 11th. The Staph in his sputum is MSSA. Discontinued vancomycin on 7/14. Continue with Zosyn and Levaquin. He is also completing Flagyl Clostridium difficile toxin present in his stool.  No recent stools.      Pulmonology seperated from ventilatory support during the afternoon of 7/14.  He is now on room air and doing very well with his tracheostomy.  ENT hopefully can consider decannulation in the near future.      Replace potassium aggressively. Recent magnesium level is 2.1.     Speech therapy cleared for soft foods with nectar thick liquids.  Continue physical and occupational therapy.    To the floor today.      Discharge Planning: Given his vast improvement, I do not believe that he will require LTAC placement.  His mother would like to take him home to her house with  home health at discharge.  I think that that would be completely reasonable.     Jairon Perez,    07/16/17   8:09 AM

## 2017-07-16 NOTE — THERAPY EVALUATION
Acute Care - Speech Language Pathology Passy [place] Valve Evaluation  [facility]     Patient Name: Nishant Savage  : 1965  MRN: [id number]  Today's Date: 2017  Onset of Illness/Injury or Date of Surgery Date: 17     Referring Physician: Dr. Zamarripa      Admit Date: 7/3/2017   PMV placed. Pateint had expulsion of valve x1 but otherwise seemed to tolerate. Voice harsh but intelligible. Able to place and remove with minimal cueing. Recommend use of PMV with supervision only. Patient aware of percautions including not wearing when sleeping. Difficult to determine toleration of thin liquids, therefore will complete dysphagia study tomorrow.  Daniela Bentley, MS CCC-SLP 2017 11:50 AM    Visit Dx:    ICD-10-CM ICD-9-CM   1. Oropharyngeal dysphagia R13.12 787.22   2. Seizures R56.9 780.39   3. Angioedema, sequela T78.3XXS 909.9   4. Alcohol withdrawal, with delirium F10.231 291.0   5. Impaired mobility Z74.09 799.89   6. Voice absence R49.1 784.41     Patient Active Problem List   Diagnosis   • Hyperlipidemia   • HTN (hypertension)   • Gout   • Anxiety   • Arthritis   • Erectile dysfunction   • Angio-edema   • Seizures   • HCAP (healthcare-associated pneumonia)     Past Medical History:   Diagnosis Date   • Anxiety    • Arthritis    • Gout    • HTN (hypertension)    • Hyperlipidemia      Past Surgical History:   Procedure Laterality Date   • TRACHEOSTOMY N/A 2017    Procedure: TRACHEOSTOMY WITH TRACHEOSCOPY;  Surgeon: Jairon Pierson MD;  Location: [facility];  Service:           SLP EVALUATION (last 72 hours)      SLP Evaluation       17 1055                Rehab Evaluation    Document Type therapy note (daily note);evaluation  -KW        General Information    Patient Profile Review yes  -KW        Pertinent History Of Current Problem Has trach tube, now off vent  -KW        Current Diet Limitations nectar thick liquids;mechanical soft  -KW        Precautions/Limitations, Vision WFL  -KW         Precautions/Limitations, Hearing WFL  -KW        Prior Level of Function- Communication functional in all spheres  -KW        Prior Level of Function- Swallowing no diet consistency restrictions  -KW        Plans/Goals Discussed With patient  -KW        Barriers to Rehab none identified  -KW        Clinical Impression    Functional Level At Time Of Evaluation impaired  -KW        Patient's Goals For Discharge return to all previous roles/activities  -KW        Criteria for Skilled Therapeutic Interventions Met skilled criteria for speech language intervention met  -KW        Rehab Potential good, to achieve stated therapy goals  -KW        Therapy Frequency 3-5 times/wk  -KW        Predicted Duration of Therapy Intervention (days/wks) until discharge  -KW        Expected Duration of Therapy Session (min) 15-30 minutes  -KW        Anticipated Discharge Disposition home  -KW        Pain Assessment    Pain Assessment No/denies pain  -KW        Communication Assessment/Intervention    Additional Documentation Trach/Speaking Valve Asses/Intervention (Group)  -KW        Trach/Speaking Valve Assessment    Passy-Ceci Speaking Valve Type PMV-2000 (Clear)  -KW        Articulation Skills with Trach/Valve within normal limits  -KW        Phonation with Occlusion audible at 1'  -KW        Vocal Quality on Phonation vocal quality is breathy;vocal quality is noticeably hoarse  -KW        Type of Intervention finger occlusion;tracheostomy speaking valve  -KW        Response to Intervention tolerated for  -KW        Passy-Ceci Valve Tolerance - Minutes 10  -KW        PMV Placement Recommendations Speech/RN/Trained family member & Staff  -KW        Communication Treatment Objective and Progress    Receptive Language Treatment Objectives --  -KW        Additional 1 Communication Treatment    Additional 1 Comm Treatment Objective Patient will tolerate PMV trials for 30 minutes without any s/s of distress  -KW        Status:  Additional 1 New  -KW        Additional 2 Communication Treatment    Additional 2 Comm Treatment Objective Patient will produce speech over background noise with PMV in place at 3 feet away.  -KW        Status: Additional 2 New  -KW        Additional 3 Communication Treatment    Additional 3 Comm Treatment Objective Patient will independently place and remove PMV.  -KW        Status: Additional 3 New  -KW          User Key  (r) = Recorded By, (t) = Taken By, (c) = Cosigned By    Initials Name Effective Dates    KW Daniela Bentley MS [facility]SLP 08/02/16 -            EDUCATION  The patient has been educated in the following areas:   Speaking Valve.    SLP Recommendation and Plan        Rehab Potential: good, to achieve stated therapy goals  Criteria for Skilled Therapeutic Interventions Met: skilled criteria for speech language intervention met  Anticipated Discharge Disposition: home     Therapy Frequency: 3-5 times/wk  Predicted Duration of Therapy Intervention (days/wks): until discharge  Expected Duration of Therapy Session (min): 15-30 minutes    Plan of Care Review  Plan Of Care Reviewed With: patient  Progress: improving  Outcome Summary/Follow up Plan: PMV placed.  Pateint had expulsion of valve x1 but otherwise seemed to tolerate.  Voice harsh but intelligible.  Able to place and remove with minimal cueing.  Recommend use of PMV with supervision only.  Patient aware of percautions including not wearing when sleeping.  Difficult to determine toleration of thin liquids, therefore will complete dysphagia study tomorrow.          IP SLP Goals       07/16/17 1141 07/16/17 1139 07/16/17 1131    Safely Consume Diet    Safely Consume Diet- SLP, Date Goal Reviewed   07/16/17  -KW    Safely Consume Diet- SLP, Outcome goal ongoing  -KW      SLP- Additional Goal 1    Additional Goal 1- SLP, Date Established  07/16/17  -KW     Additional Goal 1- SLP, Time to Achieve  by discharge  -KW     Additional Goal 1- SLP, Activity  Level  Patient will tolerate PMV without any s/s of dsitress and produce speech over background noise at 3 feet away.  -KW     Additional Goal 1- SLP, Additional Goal  Patient will independently place and remove PMV.  -KW     Additional Goal 1- SLP, Date Goal Reviewed  07/16/17  -KW       07/15/17 1157 07/05/17 0926 07/03/17 1027    Safely Consume Diet    Safely Consume Diet- SLP, Date Established 07/15/17  -KW  07/03/17  -CS    Safely Consume Diet- SLP, Time to Achieve by discharge  -KW  by discharge  -CS    Safely Consume Diet- SLP, Additional Goal Patient will tolerate upgraded PO trials with SLP w/ no s/s of aspiration.  -KW  Pt will tolerate recommended diet as well as trials of upgraded diet consistencies w/o any overt s/s of aspiration.  -CS    Safely Consume Diet- SLP, Date Goal Reviewed 07/15/17  -KW 07/05/17  -KW     Safely Consume Diet- SLP, Outcome  goal no longer appropriate  -KW goal ongoing  -CS    Safely Consume Diet- SLP, Reason Goal Not Met  medical status inhibits participation  -KW       User Key  (r) = Recorded By, (t) = Taken By, (c) = Cosigned By    Initials Name Provider Type    KW Daniela Bentley MS CCC-SLP Speech and Language Pathologist    OMARYA Zacarias MS CCC-SLP Speech and Language Pathologist             SLP Outcome Measures (last 72 hours)      SLP Outcome Measures       07/16/17 1100 07/15/17 1100       SLP Outcome Measures    Outcome Measure Used? Adult NOMS  -KW Adult NOMS  -KW     FCM Scores    FCM Chosen Voice Following Tracheostomy  -KW Swallowing  -KW     Swallowing FCM Score  4  -KW     Voice Following Tracheostomy FCM Score 6  -KW        User Key  (r) = Recorded By, (t) = Taken By, (c) = Cosigned By    Initials Name Effective Dates    KW Daniela Bentley MS CCC-SLP 08/02/16 -           Time Calculation:         Time Calculation- SLP       07/16/17 1148          Time Calculation- SLP    SLP Start Time 1055  -KW      SLP Stop Time 1140  -KW      SLP Time Calculation (min) 45  min  -KW      SLP Received On 07/16/17  -KW        User Key  (r) = Recorded By, (t) = Taken By, (c) = Cosigned By    Initials Name Provider Type    ANASTACIO Bentley MS CCC-SLP Speech and Language Pathologist          Therapy Charges for Today     Code Description Service Date Service Provider Modifiers Qty    88659993056 HC ST SWALLOWING CURRENT STATUS 7/15/2017 Daniela Bentley MS CCC-SLP GN, CK 1    46375955017 HC ST SWALLOWING PROJECTED 7/15/2017 Daniela Bentley MS CCC-ALEXIA GN, CI 1    78152691569 HC ST EVAL ORAL PHARYNG SWALLOW 2 7/15/2017 Daniela Bentley MS CCC-SLP GN 1    81668985720 HC ST OTHER FUNCT LIMIT CURRENT 7/16/2017 Daniela Bentley MS CCC-SLP GN, CI 1    40417016874 HC ST OTHER FUNCT LIMIT PROJECTED 7/16/2017 MS KRISTINE PatinoSLP GN, CI 1    17186875532 HC ST OTHER FUNCT LIMIT DISCHARGE 7/16/2017 Daniela Bentley MS CCC-SLP GN, CI 1    34293355978 HC ST TREATMENT SWALLOW 1 7/16/2017 Daniela Bentley MS CCC-SLP GN 1    78524091806 HC ST EVALUATION FIT VOICE PROSTH 2 7/16/2017 Daniela Bentley MS CCC-SLP  1    80721715842 HC VALVE TRACH PMV SERIES 7/16/2017 Daniela Bentley MS CCC-SLP  1             ADULT NOMS (last 72 hours)      Adult NOMS       07/16/17 1100 07/15/17 1100             FCM Scores    FCM Chosen Voice Following Tracheostomy  -KW Swallowing  -KW       Swallowing FCM Score  4  -KW       Voice Following Tracheostomy FCM Score 6  -KW          User Key  (r) = Recorded By, (t) = Taken By, (c) = Cosigned By    Initials Name Effective Dates    MS LEONIDES Clancy 08/02/16 -         SLP G-Codes  SLP NOMS Used?: Yes  Functional Limitations: Other Speech Language Pathology (voice after trach)  Swallow Current Status (): At least 40 percent but less than 60 percent impaired, limited or restricted  Swallow Goal Status (): At least 1 percent but less than 20 percent impaired, limited or restricted  Other Speech-Language Pathology Functional Limitation Current Status (): At  least 1 percent but less than 20 percent impaired, limited or restricted  Other Speech-Language Pathology Functional Limitation Goal Status (): At least 1 percent but less than 20 percent impaired, limited or restricted  Other Speech-Language Pathology Functional Limitation Discharge Status (): At least 1 percent but less than 20 percent impaired, limited or restricted      Daniela Bentley MS CCC-SLP  2017 and Acute Care - Speech Language Pathology   Swallow Treatment Note [facility]     Patient Name: Nishant Savage  : 1965  MRN: [id number]  Today's Date: 2017  Onset of Illness/Injury or Date of Surgery Date: 17     Referring Physician: Dr. Zamarripa      Admit Date: 7/3/2017    Visit Dx:      ICD-10-CM ICD-9-CM   1. Oropharyngeal dysphagia R13.12 787.22   2. Seizures R56.9 780.39   3. Angioedema, sequela T78.3XXS 909.9   4. Alcohol withdrawal, with delirium F10.231 291.0   5. Impaired mobility Z74.09 799.89   6. Voice absence R49.1 784.41     Patient Active Problem List   Diagnosis   • Hyperlipidemia   • HTN (hypertension)   • Gout   • Anxiety   • Arthritis   • Erectile dysfunction   • Angio-edema   • Seizures   • HCAP (healthcare-associated pneumonia)             Adult Rehabilitation Note       17 1055 07/15/17 1130 07/15/17 0836    Rehab Assessment/Intervention    Discipline speech language pathologist  -KW  physical therapy assistant  -MF    Subjective Information no complaints  -KW      Patient Effort, Rehab Treatment excellent  -KW      Treatment Not Performed   other (see comments)  -MF    Treatment Not Performed, Comment   Pt. off vent and is currently up with nurisng on toilet. Notified nursing that pt. will need new orders and re-mayte to continue PT therapy.  -MF    Recorded by [KW] Daniela Bentley MS CCC-SLP  [MF] Uma Restrepo PTA    Dysphagia/Swallow Intervention    Dysphagia/Swallow Therapeutic Feed Took trials of thin liquids.  Difficulty to determine safety.   No overt coughing but some wet voice vs harsh vocal quality.  Will complete Dysphagia Study Monday to determine safet diet consistency.  -KW Patient will tolerate upgraded diet consistency with SLP w/ no overt s/s of aspiration.  -KW     Recorded by [KW] Daniela Bentley MS CCC-SLP [KW] Daniela Bentley MS CCC-SLP       07/14/17 0854          Rehab Assessment/Intervention    Discipline physical therapy assistant  -SUKHDEEP      Document Type therapy note (daily note)  -SUKHDEEP      Subjective Information no complaints;agree to therapy  -SUKHDEEP      Precautions/Limitations oxygen therapy device and L/min   vent, restraints, NG tube  -SUKHDEEP      Recorded by [SUKHDEEP] Britt Fuller PTA      Pain Assessment    Pain Assessment No/denies pain  -SUKHDEEP      Recorded by [SUKHDEEP] Britt Fuller PTA      Therapy Exercises    Bilateral Lower Extremities AAROM:;AROM:;20 reps;supine;ankle pumps/circles;heel slides;hip abduction/adduction;hip ER;hip IR;SAQ  -SUKHDEEP      Bilateral Upper Extremity AAROM:;AROM:;15 reps;20 reps;supine;elbow flexion/extension;hand pumps;shoulder abduction/adduction;shoulder extension/flexion;shoulder ER/IR;shoulder horizontal abd/add  -SUKHDEEP      Recorded by [SUKHDEEP] Britt Fuller PTA      Positioning and Restraints    Pre-Treatment Position in bed  -SUKHDEEP      Post Treatment Position bed  -SUKHDEEP      In Bed fowlers;call light within reach;encouraged to call for assist;with nsg;side rails up x3;RUE elevated;LUE elevated;SCD pump applied  -SUKHDEEP      Restraints released:;reapplied:;soft limb  -SUKHDEEP      Recorded by [SUKHDEEP] Britt Fuller PTA        User Key  (r) = Recorded By, (t) = Taken By, (c) = Cosigned By    Initials Name Effective Dates    SUKHDEEP Britt Fuller, NATALIE 08/02/16 -     KW Daniela Bentley MS CCC-SLP 08/02/16 -     MF Uma Restrepo, NATALIE 08/02/16 -                   IP SLP Goals       07/16/17 1141 07/16/17 1139 07/16/17 1131    Safely Consume Diet    Safely Consume Diet- SLP, Date Goal Reviewed   07/16/17  -KW    Safely  Consume Diet- SLP, Outcome goal ongoing  -KW      SLP- Additional Goal 1    Additional Goal 1- SLP, Date Established  07/16/17  -KW     Additional Goal 1- SLP, Time to Achieve  by discharge  -KW     Additional Goal 1- SLP, Activity Level  Patient will tolerate PMV without any s/s of dsitress and produce speech over background noise at 3 feet away.  -KW     Additional Goal 1- SLP, Additional Goal  Patient will independently place and remove PMV.  -KW     Additional Goal 1- SLP, Date Goal Reviewed  07/16/17  -KW       07/15/17 1157 07/05/17 0926 07/03/17 1027    Safely Consume Diet    Safely Consume Diet- SLP, Date Established 07/15/17  -KW  07/03/17  -CS    Safely Consume Diet- SLP, Time to Achieve by discharge  -KW  by discharge  -CS    Safely Consume Diet- SLP, Additional Goal Patient will tolerate upgraded PO trials with SLP w/ no s/s of aspiration.  -KW  Pt will tolerate recommended diet as well as trials of upgraded diet consistencies w/o any overt s/s of aspiration.  -CS    Safely Consume Diet- SLP, Date Goal Reviewed 07/15/17  -KW 07/05/17  -KW     Safely Consume Diet- SLP, Outcome  goal no longer appropriate  -KW goal ongoing  -CS    Safely Consume Diet- SLP, Reason Goal Not Met  medical status inhibits participation  -KW       User Key  (r) = Recorded By, (t) = Taken By, (c) = Cosigned By    Initials Name Provider Type    ANASTACIO Bentley MS CCC-SLP Speech and Language Pathologist    OMAYRA Zacarias MS CCC-SLP Speech and Language Pathologist          EDUCATION  The patient has been educated in the following areas:   Dysphagia (Swallowing Impairment).    SLP Recommendation and Plan                       Anticipated Discharge Disposition: home        Predicted Duration of Therapy Intervention (days/wks): until discharge  Expected Duration of Therapy Session (min): 15-30 minutes       Plan of Care Review  Plan Of Care Reviewed With: patient  Progress: improving  Outcome Summary/Follow up Plan: PMV placed.   Pateint had expulsion of valve x1 but otherwise seemed to tolerate.  Voice harsh but intelligible.  Able to place and remove with minimal cueing.  Recommend use of PMV with supervision only.  Patient aware of percautions including not wearing when sleeping.  Difficult to determine toleration of thin liquids, therefore will complete dysphagia study tomorrow.       SLP Outcome Measures (last 72 hours)      SLP Outcome Measures       07/16/17 1100 07/15/17 1100       SLP Outcome Measures    Outcome Measure Used? Adult NOMS  -KW Adult NOMS  -KW     FCM Scores    FCM Chosen Voice Following Tracheostomy  -KW Swallowing  -KW     Swallowing FCM Score  4  -KW     Voice Following Tracheostomy FCM Score 6  -KW        User Key  (r) = Recorded By, (t) = Taken By, (c) = Cosigned By    Initials Name Effective Dates    ANASTACIO Bentley MS CCC-ALEXIA 08/02/16 -              Time Calculation:         Time Calculation- SLP       07/16/17 1148          Time Calculation- SLP    SLP Start Time 1055  -KW      SLP Stop Time 1140  -KW      SLP Time Calculation (min) 45 min  -KW      SLP Received On 07/16/17  -KW        User Key  (r) = Recorded By, (t) = Taken By, (c) = Cosigned By    Initials Name Provider Type    ANASTACIO Bentley MS CCC-SLP Speech and Language Pathologist          Therapy Charges for Today     Code Description Service Date Service Provider Modifiers Qty    50754489512 HC ST SWALLOWING CURRENT STATUS 7/15/2017 Daniela Bentley MS CCC-SLP GN, CK 1    99588898499 HC ST SWALLOWING PROJECTED 7/15/2017 MS LEONIDES Patino GN, CI 1    63029359288 HC ST EVAL ORAL PHARYNG SWALLOW 2 7/15/2017 MS LEONIDES Patino GN 1    55665494430 HC ST OTHER FUNCT LIMIT CURRENT 7/16/2017 MS LEONIDES Patino GN, CI 1    34765017957 HC ST OTHER FUNCT LIMIT PROJECTED 7/16/2017 Daniela Bentley MS CCC-SLP GN, CI 1    30819864388 HC ST OTHER FUNCT LIMIT DISCHARGE 7/16/2017 MS LEONIDES Patino GN, CI 1    46565028701 HC ST  TREATMENT SWALLOW 1 7/16/2017 MS LEONIDES Patino GN 1    27687869839 HC ST EVALUATION FIT VOICE PROSTH 2 7/16/2017 MS KRISTINE PatinoSLP  1    95940711886 HC VALVE TRACH PMV SERIES 7/16/2017 MS KRISTINE PatinoSLP  1          SLP G-Codes  SLP NOMS Used?: Yes  Functional Limitations: Other Speech Language Pathology (voice after trach)  Swallow Current Status (): At least 40 percent but less than 60 percent impaired, limited or restricted  Swallow Goal Status (): At least 1 percent but less than 20 percent impaired, limited or restricted  Other Speech-Language Pathology Functional Limitation Current Status (): At least 1 percent but less than 20 percent impaired, limited or restricted  Other Speech-Language Pathology Functional Limitation Goal Status (): At least 1 percent but less than 20 percent impaired, limited or restricted  Other Speech-Language Pathology Functional Limitation Discharge Status (): At least 1 percent but less than 20 percent impaired, limited or restricted      MS LEONIDES Alva  7/16/2017

## 2017-07-16 NOTE — PLAN OF CARE
Problem: Patient Care Overview (Adult)  Goal: Plan of Care Review  Outcome: Ongoing (interventions implemented as appropriate)    07/16/17 1141   Coping/Psychosocial Response Interventions   Plan Of Care Reviewed With patient   Patient Care Overview   Progress improving   Outcome Evaluation   Outcome Summary/Follow up Plan PMV placed. Pateint had expulsion of valve x1 but otherwise seemed to tolerate. Voice harsh but intelligible. Able to place and remove with minimal cueing. Recommend use of PMV with supervision only. Patient aware of percautions including not wearing when sleeping. Difficult to determine toleration of thin liquids, therefore will complete dysphagia study tomorrow.         Problem: Inpatient SLP  Goal: Dysphagia- Patient will safely consume diet as per recommendation with no signs/symptoms of aspiration  Outcome: Ongoing (interventions implemented as appropriate)    07/05/17 0926 07/15/17 1157 07/16/17 1131   Safely Consume Diet   Safely Consume Diet- SLP, Date Established --  07/15/17 --    Safely Consume Diet- SLP, Time to Achieve --  by discharge --    Safely Consume Diet- SLP, Additional Goal --  Patient will tolerate upgraded PO trials with SLP w/ no s/s of aspiration. --    Safely Consume Diet- SLP, Date Goal Reviewed --  --  07/16/17   Safely Consume Diet- SLP, Outcome --  --  --    Safely Consume Diet- SLP, Reason Goal Not Met medical status inhibits participation --  --      07/16/17 1141   Safely Consume Diet   Safely Consume Diet- SLP, Date Established --    Safely Consume Diet- SLP, Time to Achieve --    Safely Consume Diet- SLP, Additional Goal --    Safely Consume Diet- SLP, Date Goal Reviewed --    Safely Consume Diet- SLP, Outcome goal ongoing   Safely Consume Diet- SLP, Reason Goal Not Met --        Goal: SLP Additional Goal 1  Outcome: Ongoing (interventions implemented as appropriate)    07/16/17 1139   SLP- Additional Goal 1   Additional Goal 1- SLP, Date Established 07/16/17    Additional Goal 1- SLP, Time to Achieve by discharge   Additional Goal 1- SLP, Activity Level Patient will tolerate PMV without any s/s of dsitress and produce speech over background noise at 3 feet away.   Additional Goal 1- SLP, Additional Goal Patient will independently place and remove PMV.   Additional Goal 1- SLP, Date Goal Reviewed 07/16/17

## 2017-07-16 NOTE — THERAPY RE-EVALUATION
Acute Care - Physical Therapy Re-Evaluation  [facility]     Patient Name: Nishant Savage  : 1965  MRN: [id number]  Today's Date: 2017   Onset of Illness/Injury or Date of Surgery Date: 17  Date of Referral to PT: 17  Referring Physician: Dr. Perez      Admit Date: 7/3/2017     Visit Dx:    ICD-10-CM ICD-9-CM   1. Oropharyngeal dysphagia R13.12 787.22   2. Seizures R56.9 780.39   3. Angioedema, sequela T78.3XXS 909.9   4. Alcohol withdrawal, with delirium F10.231 291.0   5. Impaired mobility Z74.09 799.89   6. Voice absence R49.1 784.41     Patient Active Problem List   Diagnosis   • Hyperlipidemia   • HTN (hypertension)   • Gout   • Anxiety   • Arthritis   • Erectile dysfunction   • Angio-edema   • Seizures   • HCAP (healthcare-associated pneumonia)     Past Medical History:   Diagnosis Date   • Anxiety    • Arthritis    • Gout    • HTN (hypertension)    • Hyperlipidemia      Past Surgical History:   Procedure Laterality Date   • TRACHEOSTOMY N/A 2017    Procedure: TRACHEOSTOMY WITH TRACHEOSCOPY;  Surgeon: Jairon Pierson MD;  Location: [facility] OR;  Service:           PT ASSESSMENT (last 72 hours)      PT Evaluation       17 1441 17 1055    Rehab Evaluation    Document Type re-evaluation   See MAR  -MARJORIE therapy note (daily note);evaluation  -KW    Subjective Information agree to therapy;complains of;weakness;fatigue  -MARJORIE no complaints  -KW    Patient Effort, Rehab Treatment  excellent  -KW    General Information    Patient Profile Review yes  -MARJORIE     Onset of Illness/Injury or Date of Surgery Date 17  -MARJORIE     Referring Physician Dr. Perez  -MARJORIE     General Observations Fowlers in bed, alert, daughter in room.   -MARJORIE     Pertinent History Of Current Problem s/p  tracheostomy.  B LE venous scan: negative. 7/15 cxr,  KUB,  renal US.   -MARJORIE     Precautions/Limitations fall precautions   c-diff, trach  -MARJORIE     Prior Level of Function independent:;all  household mobility;community mobility;ADL's;dressing;bathing  -MARJORIE     Equipment Currently Used at Home --   has access to RW  -MARJORIE     Plans/Goals Discussed With patient and family;agreed upon  -MARJORIE     Risks Reviewed patient and family:;LOB;nausea/vomiting;dizziness;increased discomfort;change in vital signs  -MARJORIE     Benefits Reviewed patient and family:;improve function;increase independence;increase strength;increase balance;decrease pain;increase knowledge;improve skin integrity  -MARJORIE     Barriers to Rehab medically complex  -MARJORIE     Living Environment    Lives With parent(s)   plans to d/c home with family/parents  -MARJORIE     Living Arrangements house  -MARJORIE     Home Accessibility bed and bath on same level;stairs to enter home  -MARJORIE     Number of Stairs to Enter Home 3  -MARJORIE     Clinical Impression    Date of Referral to PT 07/16/17  -MARJORIE     PT Diagnosis Impaired mobility, weakness  -MARJORIE     Functional Level At Time Of Evaluation Min assist gait ~ 70 ft. with RW.   -MARJORIE     Patient/Family Goals Statement Retun home with family, increase strength/mobility  -MARJORIE     Criteria for Skilled Therapeutic Interventions Met yes;treatment indicated  -MARJORIE     Impairments Found (describe specific impairments) gait, locomotion, and balance  -MARJORIE     Rehab Potential good, to achieve stated therapy goals  -MARJORIE     Predicted Duration of Therapy Intervention (days/wks) until d/c  -MARJORIE     Pain Assessment    Pain Assessment No/denies pain  -MARJORIE No/denies pain  -KW    Vision Assessment/Intervention    Visual Impairment WFL   per pt  -MARJORIE     Cognitive Assessment/Intervention    Current Cognitive/Communication Assessment functional  -MARJORIE     Orientation Status oriented x 4  -MARJORIE     Follows Commands/Answers Questions able to follow multi-step instructions;100% of the time  -MARJORIE     Personal Safety --   fall risk  -MARJORIE     Personal Safety Interventions fall prevention program maintained;gait belt;muscle strengthening facilitated;nonskid shoes/slippers  when out of bed  -MARJORIE     ROM (Range of Motion)    General ROM Detail B shoulder AROM impaired ~ 25% (chronic rotator cuff problems), B elbow/hand AROM WFL. B LE AROM impaired ~ 25%  -MARJORIE     MMT (Manual Muscle Testing)    General MMT Assessment Detail B UE functionally 4-/5. B LE functionally 3+/5.   -MARJORIE     Bed Mobility, Assessment/Treatment    Bed Mobility, Assistive Device head of bed elevated;bed rails  -MARJORIE     Bed Mobility, Roll Left, Wirt supervision required  -MARJORIE     Bed Mobility, Roll Right, Wirt supervision required  -MARJORIE     Bed Mobility, Scoot/Bridge, Wirt supervision required  -MARJORIE     Bed Mob, Supine to Sit, Wirt verbal cues required;minimum assist (75% patient effort)  -MARJORIE     Bed Mob, Sit to Supine, Wirt verbal cues required;contact guard assist  -MARJORIE     Bed Mobility, Safety Issues decreased use of arms for pushing/pulling;decreased use of legs for bridging/pushing  -MARJORIE     Bed Mobility, Impairments strength decreased  -MARJORIE     Transfer Assessment/Treatment    Transfers, Sit-Stand Wirt verbal cues required;minimum assist (75% patient effort)  -MARJORIE     Transfers, Stand-Sit Wirt verbal cues required;minimum assist (75% patient effort)  -MARJORIE     Transfers, Sit-Stand-Sit, Assist Device rolling walker  -MARJORIE     Transfer, Safety Issues step length decreased  -MARJORIE     Transfer, Impairments strength decreased;impaired balance  -MARJORIE     Gait Assessment/Treatment    Gait, Wirt Level verbal cues required;minimum assist (75% patient effort)  -MARJORIE     Gait, Assistive Device rolling walker  -MARJORIE     Gait, Distance (Feet) 70  -MARJORIE     Gait, Gait Pattern Analysis swing-to gait  -MARJORIE     Gait, Gait Deviations bilateral:;mile decreased;step length decreased  -MARJORIE     Gait, Safety Issues step length decreased  -MARJORIE     Gait, Impairments strength decreased;impaired balance  -MARJORIE     Motor Skills/Interventions    Additional Documentation Balance Skills Training (Group)   -MARJORIE     Balance Skills Training    Sitting-Level of Assistance Close supervision  -MARJORIE     Sitting-Balance Support Right upper extremity supported;Left upper extremity supported;Feet supported  -MARJORIE     Standing-Level of Assistance Contact guard;Minimum assistance  -MARJORIE     Static Standing Balance Support assistive device  -MARJORIE     Gait Balance-Level of Assistance Contact guard;Minimum assistance  -MARJORIE     Gait Balance Support assistive device  -MARJORIE     Sensory Assessment/Intervention    Sensory Impairment hypersensitivity   B LE  -MARJORIE     Positioning and Restraints    Pre-Treatment Position in bed  -MARJORIE     Post Treatment Position bed  -MARJORIE     In Bed notified nsg;fowlers;call light within reach;encouraged to call for assist;with family/caregiver  -MARJORIE       07/15/17 1130 07/14/17 1600    Rehab Evaluation    Document Type evaluation  -KW     Subjective Information no complaints  -KW     General Information    Equipment Currently Used at Home  none  -AF    Living Environment    Transportation Available  family or friend will provide;car  -AF    Pain Assessment    Pain Assessment No/denies pain  -KW       07/14/17 1330 07/14/17 0854    Rehab Evaluation    Document Type  therapy note (daily note)  -SUKHDEEP    Subjective Information  no complaints;agree to therapy  -SUKHDEEP    Evaluation Not Performed other (see comments)   Still on vent with thick secretions per RT. Should be coming off vent soon.  -MB     General Information    Precautions/Limitations  oxygen therapy device and L/min   vent, restraints, NG tube  -SUKHDEEP    Pain Assessment    Pain Assessment  No/denies pain  -SUKHDEEP    Therapy Exercises    Bilateral Lower Extremities  AAROM:;AROM:;20 reps;supine;ankle pumps/circles;heel slides;hip abduction/adduction;hip ER;hip IR;SAQ  -SUKHDEEP    Bilateral Upper Extremity  AAROM:;AROM:;15 reps;20 reps;supine;elbow flexion/extension;hand pumps;shoulder abduction/adduction;shoulder extension/flexion;shoulder ER/IR;shoulder horizontal abd/add  -SUKHDEEP     Positioning and Restraints    Pre-Treatment Position  in bed  -SUKHDEEP    Post Treatment Position  bed  -SUKHDEEP    In Bed  fowlers;call light within reach;encouraged to call for assist;with nsg;side rails up x3;RUE elevated;LUE elevated;SCD pump applied  -SUKHDEEP    Restraints  released:;reapplied:;soft limb  -SUKHDEEP      User Key  (r) = Recorded By, (t) = Taken By, (c) = Cosigned By    Initials Name Provider Type    KASSIE Heck, CCC-SLP Speech and Language Pathologist    MARJORIE Olsen, PT DPT Physical Therapist    SUKHDEEP Fuller, PTA Physical Therapy Assistant    KW Daniela Bentley, MS CCC-SLP Speech and Language Pathologist    PATRICIA Garcia, RN Registered Nurse          Physical Therapy Education     Title: PT OT SLP Therapies (Active)     Topic: Physical Therapy (Active)     Point: Mobility training (Done)    Learning Progress Summary    Learner Readiness Method Response Comment Documented by Status   Patient Acceptance E VU,DU,NR Educated pt/family on progression of PT POC and benefits of activity  07/16/17 1441 Done    Acceptance E,D DU Benefits of activity;ex's  07/14/17 0920 Done    Acceptance E,D NR Benefit of activities; ex's  07/13/17 0847 Active    Nonacceptance E NL Pt. sedated on vent, no evidence of learning.  07/11/17 0905 Active   Family Acceptance E VU,DU,NR Educated pt/family on progression of PT POC and benefits of activity  07/16/17 1441 Done               Point: Home exercise program (Active)    Learning Progress Summary    Learner Readiness Method Response Comment Documented by Status   Patient Nonacceptance E NL Pt. sedated on vent, no evidence of learning.  07/12/17 0837 Active    Nonacceptance E NL Pt. sedated on vent, no evidence of learning.  07/11/17 0905 Active    Acceptance E NR Educated pt on progression of PT POC and benefits of activity  07/10/17 0810 Active               Point: Body mechanics (Active)    Learning Progress Summary    Learner Readiness Method  Response Comment Documented by Status   Patient Nonacceptance E NL Pt. sedated on vent, no evidence of learning.  07/11/17 0905 Active               Point: Precautions (Active)    Learning Progress Summary    Learner Readiness Method Response Comment Documented by Status   Patient Nonacceptance E NL Pt. sedated on vent, no evidence of learning.  07/11/17 0905 Active                      User Key     Initials Effective Dates Name Provider Type Discipline     08/02/16 -  Jaime Olsen, PT DPT Physical Therapist PT     08/02/16 -  Britt Fuller, PTA Physical Therapy Assistant PT     08/02/16 -  Uma Restrepo, PTA Physical Therapy Assistant PT                PT Recommendation and Plan  Anticipated Discharge Disposition: home with assist, home with home health, home with 24/7 care, transitional care, skilled nursing facility, inpatient rehabilitation facility (pending progress)  Planned Therapy Interventions: bed mobility training, transfer training, gait training, balance training, home exercise program, patient/family education, postural re-education, stair training, strengthening  PT Frequency: daily, 2 times/day  Plan of Care Review  Plan Of Care Reviewed With: patient  Progress: improving  Outcome Summary/Follow up Plan: PT completed Re-eval. Pt. off the vent and required min assist to stand and ambulate ~ 70 ft. with RW. Pt. with generalized weakness and hypersensitivity in B LE, worse in L LE. PT will continue to work to progress pt's functional mobility, balance, and strength. Consider rehab vs TCU vs home with family and HH, pending progress for d/c needs and location.           IP PT Goals       07/16/17 1441 07/10/17 0810       Bed Mobility PT LTG    Bed Mobility PT LTG, Date Established 07/16/17  -MARJORIE 07/10/17  -MARJORIE     Bed Mobility PT LTG, Time to Achieve by discharge  -MARJORIE by discharge  -MARJORIE     Bed Mobility PT LTG, Activity Type all bed mobility  -MARJORIE roll left/roll right  -MARJORIE     Bed  Mobility PT LTG, Jewell Level independent  -MARJORIE moderate assist (50% patient effort)  -MARJORIE     Bed Mobility PT Goal  LTG, Assist Device bed rails  -MARJORIE bed rails  -MARJORIE     Bed Mobility PT LTG, Date Goal Reviewed 07/16/17  -MARJORIE      Bed Mobility PT LTG, Outcome goal revised  -MARJORIE      Bed Mobility PT LTG, Reason Goal Not Met goals revised this date   d/c 7/10 goal, goal updated  -MARJORIE      Transfer Training PT LTG    Transfer Training PT LTG, Date Established 07/16/17  -MARJORIE      Transfer Training PT LTG, Time to Achieve by discharge  -MARJORIE      Transfer Training PT LTG, Activity Type bed to chair /chair to bed;sit to stand/stand to sit  -MARJORIE      Transfer Training PT LTG, Jewell Level conditional independence;independent  -MARJORIE      Transfer Training PT LTG, Assist Device walker, rolling  -MARJORIE      Gait Training PT LTG    Gait Training Goal PT LTG, Date Established 07/16/17  -MARJORIE      Gait Training Goal PT LTG, Time to Achieve by discharge  -MARJORIE      Gait Training Goal PT LTG, Jewell Level supervision required;conditional independence  -MARJORIE      Gait Training Goal PT LTG, Assist Device walker, rolling  -MARJORIE      Gait Training Goal PT LTG, Distance to Achieve 200  -MARJORIE      Stair Training PT LTG    Stair Training Goal PT LTG, Date Established 07/16/17  -MARJORIE      Stair Training Goal PT LTG, Time to Achieve by discharge  -MARJORIE      Stair Training Goal PT LTG, Number of Steps 3  -MARJORIE      Stair Training Goal PT LTG, Jewell Level contact guard assist  -MARJORIE      Stair Training Goal PT LTG, Distance to Achieve Attempt stairs if pt. to d/c home  -MARJORIE      Strength Goal PT LTG    Strength Goal PT LTG, Date Established  07/10/17  -MARJORIE     Strength Goal PT LTG, Time to Achieve  by discharge  -MARJORIE     Strength Goal PT LTG, Measure to Achieve  AAROM/AROM, B UE/LE, 10-20 reps, min assist  -MARJORIE     Strength Goal PT LTG, Date Goal Reviewed 07/16/17  -MARJORIE      Strength Goal PT LTG, Outcome goal partially met  -MARJORIE      Strength Goal PT LTG,  Reason Goal Not Met progress slower than expected  -MARJORIE      Physical Therapy PT LTG    Physical Therapy PT LTG, Date Established  07/10/17  -MARJORIE     Physical Therapy PT LTG, Time to Achieve  by discharge  -MARJORIE     Physical Therapy PT LTG, Activity Type  No new evidence of skin breakdown or contractures while pt. on the vent.   -MARJORIE     Physical Therapy PT LTG, Date Goal Reviewed 07/16/17  -MARJORIE      Physical Therapy PT LTG, Outcome goal met  -MARJORIE      Physical Therapy PT LTG, Reason Goal Not Met goals revised this date   d/c goal  -MARJORIE        User Key  (r) = Recorded By, (t) = Taken By, (c) = Cosigned By    Initials Name Provider Type    MARJORIE Olsen, PT DPT Physical Therapist                Outcome Measures       07/16/17 1441 07/14/17 0900       How much help from another person do you currently need...    Turning from your back to your side while in flat bed without using bedrails? 3  -MARJORIE 2  -SUKHDEEP     Moving from lying on back to sitting on the side of a flat bed without bedrails? 3  -MARJORIE 2  -SUKHDEEP     Moving to and from a bed to a chair (including a wheelchair)? 3  -MARJORIE 2  -SUKHDEEP     Standing up from a chair using your arms (e.g., wheelchair, bedside chair)? 3  -MARJORIE 2  -SUKHDEEP     Climbing 3-5 steps with a railing? 2  -MARJORIE 1  -SUKHDEEP     To walk in hospital room? 3  -MARJORIE 1  -SUKHDEEP     AM-PAC 6 Clicks Score 17  -MARJORIE 10  -SUKHDEEP     Functional Assessment    Outcome Measure Options AM-PAC 6 Clicks Basic Mobility (PT)  -MARJORIE        User Key  (r) = Recorded By, (t) = Taken By, (c) = Cosigned By    Initials Name Provider Type    MARJORIE Olsen, PT DPT Physical Therapist    SUKHDEEP Fuller, PTA Physical Therapy Assistant           Time Calculation:         PT Charges       07/16/17 1552          Time Calculation    Start Time 1441  -MARJORIE      Stop Time 1530  -MARJORIE      Time Calculation (min) 49 min  -MARJORIE      PT Received On 07/16/17  -MARJORIE      PT Goal Re-Cert Due Date 07/26/17  -MARJORIE      Time Calculation- PT    Total Timed Code Minutes- PT 19  minute(s)  -MARJORIE        User Key  (r) = Recorded By, (t) = Taken By, (c) = Cosigned By    Initials Name Provider Type    MARJORIE Olsen, PT DPT Physical Therapist          Therapy Charges for Today     Code Description Service Date Service Provider Modifiers Qty    50327646267 HC PT MOBILITY CURRENT 7/16/2017 Jaime Olsen, PT DPT GP, CK 1    48951615181 HC PT MOBILITY PROJECTED 7/16/2017 Jaime Olsen, PT DPT GP, CI 1    83785205141 HC PT RE-EVAL ESTABLISHED PLAN 2 7/16/2017 Jaime Olsen, PT DPT GP 1    09196713825 HC GAIT TRAINING EA 15 MIN 7/16/2017 Jaime Olsen, PT DPT GP 1          PT G-Codes  Outcome Measure Options: AM-PAC 6 Clicks Basic Mobility (PT)  Score: 17  Functional Limitation: Mobility: Walking and moving around  Mobility: Walking and Moving Around Current Status (): At least 40 percent but less than 60 percent impaired, limited or restricted  Mobility: Walking and Moving Around Goal Status (): At least 1 percent but less than 20 percent impaired, limited or restricted      Jaime Olsen, PT DPT  7/16/2017

## 2017-07-16 NOTE — PLAN OF CARE
Problem: Patient Care Overview (Adult)  Goal: Plan of Care Review  Outcome: Ongoing (interventions implemented as appropriate)    07/16/17 1818   Coping/Psychosocial Response Interventions   Plan Of Care Reviewed With patient   Patient Care Overview   Progress improving         Problem: Seizure Disorder/Epilepsy (Adult)  Goal: Signs and Symptoms of Listed Potential Problems Will be Absent or Manageable (Seizure Disorder/Epilepsy)  Outcome: Outcome(s) achieved Date Met:  07/16/17    Problem: Fall Risk (Adult)  Goal: Absence of Falls  Outcome: Ongoing (interventions implemented as appropriate)    Problem: Pressure Ulcer Risk (Roman Scale) (Adult,Obstetrics,Pediatric)  Goal: Skin Integrity  Outcome: Ongoing (interventions implemented as appropriate)    Problem: SAFETY - NON-VIOLENT RESTRAINT  Goal: Remains free of injury from restraints (Non-Violent Restraint)  Outcome: Outcome(s) achieved Date Met:  07/16/17  Goal: Free from restraint(s) (Non-Violent Restraint)  Outcome: Outcome(s) achieved Date Met:  07/16/17    Problem: Nutrition, Enteral (Adult)  Goal: Signs and Symptoms of Listed Potential Problems Will be Absent or Manageable (Nutrition, Enteral)  Outcome: Outcome(s) achieved Date Met:  07/16/17

## 2017-07-16 NOTE — PROGRESS NOTES
PULMONARY AND CRITICAL CARE PROGRESS NOTE - [facility]    Patient: Nishant Savage    1965    MR# [id number]    Acct# [id number]  07/16/17   8:32 AM  Referring Provider: Jairon Perez DO    Chief Complaint: S/p tracheostomy     Interval history: He's on room air, plugging his trach with strong speech. He say's he feels great and is glad to be able to eat some, he has no new complaints. He has family members at bedside visiting this morning. He looks well enough to go to the floor. No other aggravating or allevitating factors.       Meds:    albuterol 2.5 mg Nebulization Q6H - RT   amLODIPine 10 mg Per G Tube Q24H   carvedilol 25 mg Oral Q12H   CloNIDine 1 patch Transdermal Weekly   dextromethorphan polistirex ER 30 mg Oral BID   diazePAM 2 mg Nasogastric Q8H   enoxaparin 40 mg Subcutaneous Daily   famotidine 20 mg Per G Tube Q12H   flintstones complete 1 tablet Oral Daily   levETIRAcetam 1,000 mg Oral Q12H   levoFLOXacin 750 mg Oral Q24H   losartan 50 mg Oral Q24H   metroNIDAZOLE 500 mg Per G Tube Q8H   potassium chloride 40 mEq Oral BID With Meals        Review of Systems:   Review of Systems - History obtained from the patient  General ROS: negative  ENT ROS: negative  Respiratory ROS: no cough, shortness of breath, or wheezing  Cardiovascular ROS: no chest pain or dyspnea on exertion  Gastrointestinal ROS: no abdominal pain, change in bowel habits, or black or bloody stools  Musculoskeletal ROS: positive for - gait disturbance and muscular weakness  Neurological ROS: negative  Dermatological ROS: negative    Physical Exam:  Temp:  [97.6 °F (36.4 °C)-99.4 °F (37.4 °C)] 97.6 °F (36.4 °C)  Heart Rate:  [78-95] 87  Resp:  [15-24] 15  BP: (103-176)/() 139/89    Intake/Output Summary (Last 24 hours) at 07/16/17 0832  Last data filed at 07/16/17 0500   Gross per 24 hour   Intake              295 ml   Output              250 ml   Net               45 ml     SpO2 Readings from Last 3  Encounters:   07/16/17 100%   09/30/16 98%   02/04/16 96%     Physical Exam   Gen: Awake and alert, very interactive.   HEENT: Normocephalic and atraumatic. Voice is strong when patient plugs trach.   Neck: Supple, trach in place with some scant secretions.   Respiratory: Good air movement throughout. No distress.   Cardiac: regular rate and rhythm. No murmur or gallop noted.   Abdomen: non tender, soft, bowel sounds present.   Neurologic: He is alert and following all commands.   Psychiatric: Alert and oriented.   Dermatologic: No rash noted.  Extremities: Moves all extremities, some generalized weakness to legs.   Musculoskeletal: No joint deformities noted.  Lymphatic: No adenopathy palpated    Results from last 7 days  Lab Units 07/16/17  0230 07/15/17  0143 07/14/17  0257   WBC 10*3/mm3 19.16* 16.11* 16.14*   HEMOGLOBIN g/dL 11.3* 11.2* 10.9*   PLATELETS 10*3/mm3 357 366 400       Results from last 7 days  Lab Units 07/16/17  0230 07/15/17  0143 07/14/17  0257   SODIUM mmol/L 143 146* 147*   POTASSIUM mmol/L 3.6 3.4* 2.9*   BUN mg/dL 18 18 24*   CREATININE mg/dL 0.95 0.90 0.99       Results from last 7 days  Lab Units 07/15/17  0217 07/14/17  0225 07/13/17  0317 07/12/17  0310   PH, ARTERIAL pH units 7.435 7.444 7.441 7.498*   PCO2, ARTERIAL mm Hg 35.4 33.7* 38.8 36.6   PO2 ART mm Hg 85.1 75.2* 105.3* 76.8*   FIO2 %  --  40 40 40        Recent films:  Films reviewed personally by me.  My interpretation: No new today    Pulmonary Assessment:  1. Respiratory failure related to agitation from ethanol withdrawal-now resolved   2. Malignant HTN-persistent  3. History of chronic ethanol use  4. C-diff   5. Tongue hematoma-improved  6. MSSA respiratory culture    7. S/p tracheostomy placement -7-12-17, very stable   -  Recommend:   · Can proceed with PMV trials today   · Continue nebs.   · Continues to make excellent progress  · Okay to floor  · Pulmonology will sign off.     Electronically signed by Patrick  EPI Gold on 7/16/2017 at 8:32 AM      Physician substantive contribution:  Pertinent symptoms/interval history include: He has no pulmonary complaints.  Good voicing with trach occlusion  Respiratory exam shows pertinent findings of:diminished breath sounds, stable, no accessory mm use.  Plan includes: ok to floor, supportive care, pmv.  On room air, stable.  I have seen and examined patient personally, performing a face-to-face diagnostic evaluation with plan of care reviewed and developed with APRN and nursing staff. I have addended and/or modified the above history of present illness, physical examination, and assessment and plan to reflect my findings and impressions. Essential elements of the care plan were discussed with APRN above.  Agree with findings and assessment/plan as documented above.    Electronically signed by Gonzalo Petersen MD, on 7/16/2017, 1:19 PM

## 2017-07-16 NOTE — PLAN OF CARE
Problem: Patient Care Overview (Adult)  Goal: Plan of Care Review  Outcome: Ongoing (interventions implemented as appropriate)    07/16/17 1441   Coping/Psychosocial Response Interventions   Plan Of Care Reviewed With patient   Patient Care Overview   Progress improving   Outcome Evaluation   Outcome Summary/Follow up Plan PT completed Re-eval. Pt. off the vent and required min assist to stand and ambulate ~ 70 ft. with RW. Pt. with generalized weakness and hypersensitivity in B LE, worse in L LE. PT will continue to work to progress pt's functional mobility, balance, and strength. Consider rehab vs TCU vs home with family and HH, pending progress for d/c needs and location.          Problem: Inpatient Physical Therapy  Goal: Bed Mobility Goal LTG- PT  Outcome: Revised    07/16/17 1441   Bed Mobility PT LTG   Bed Mobility PT LTG, Date Established 07/16/17   Bed Mobility PT LTG, Time to Achieve by discharge   Bed Mobility PT LTG, Activity Type all bed mobility   Bed Mobility PT LTG, [place] Level independent   Bed Mobility PT Goal LTG, Assist Device bed rails   Bed Mobility PT LTG, Date Goal Reviewed 07/16/17   Bed Mobility PT LTG, Outcome goal revised   Bed Mobility PT LTG, Reason Goal Not Met goals revised this date  (d/c 7/10 goal, goal updated)       Goal: Strength Goal LTG- PT  Outcome: Ongoing (interventions implemented as appropriate)    07/10/17 0810 07/16/17 1441   Strength Goal PT LTG   Strength Goal PT LTG, Date Established 07/10/17 --    Strength Goal PT LTG, Time to Achieve by discharge --    Strength Goal PT LTG, Measure to Achieve AAROM/AROM, B UE/LE, 10-20 reps, min assist --    Strength Goal PT LTG, Date Goal Reviewed --  07/16/17   Strength Goal PT LTG, Outcome --  goal partially met   Strength Goal PT LTG, Reason Goal Not Met --  progress slower than expected       Goal: Physical Therapy Goal LTG- PT  Outcome: Outcome(s) achieved Date Met:  07/16/17    07/10/17 0810 07/16/17 1441   Physical  Therapy PT LTG   Physical Therapy PT LTG, Date Established 07/10/17 --    Physical Therapy PT LTG, Time to Achieve by discharge --    Physical Therapy PT LTG, Activity Type No new evidence of skin breakdown or contractures while pt. on the vent.  --    Physical Therapy PT LTG, Date Goal Reviewed --  07/16/17   Physical Therapy PT LTG, Outcome --  goal met   Physical Therapy PT LTG, Reason Goal Not Met --  goals revised this date  (d/c goal)       Goal: Transfer Training Goal 1 LTG- PT  Outcome: Ongoing (interventions implemented as appropriate)    07/16/17 1441   Transfer Training PT LTG   Transfer Training PT LTG, Date Established 07/16/17   Transfer Training PT LTG, Time to Achieve by discharge   Transfer Training PT LTG, Activity Type bed to chair /chair to bed;sit to stand/stand to sit   Transfer Training PT LTG, [place] Level conditional independence;independent   Transfer Training PT LTG, Assist Device walker, rolling       Goal: Gait Training Goal LTG- PT  Outcome: Ongoing (interventions implemented as appropriate)    07/16/17 1441   Gait Training PT LTG   Gait Training Goal PT LTG, Date Established 07/16/17   Gait Training Goal PT LTG, Time to Achieve by discharge   Gait Training Goal PT LTG, [place] Level supervision required;conditional independence   Gait Training Goal PT LTG, Assist Device walker, rolling   Gait Training Goal PT LTG, Distance to Achieve 200       Goal: Stair Training Goal LTG- PT  Outcome: Ongoing (interventions implemented as appropriate)    07/16/17 1441   Stair Training PT LTG   Stair Training Goal PT LTG, Date Established 07/16/17   Stair Training Goal PT LTG, Time to Achieve by discharge   Stair Training Goal PT LTG, Number of Steps 3   Stair Training Goal PT LTG, [place] Level contact guard assist   Stair Training Goal PT LTG, Distance to Achieve Attempt stairs if pt. to d/c home

## 2017-07-17 ENCOUNTER — APPOINTMENT (OUTPATIENT)
Dept: GENERAL RADIOLOGY | Facility: HOSPITAL | Age: 52
End: 2017-07-17

## 2017-07-17 LAB
ANION GAP SERPL CALCULATED.3IONS-SCNC: 11 MMOL/L (ref 4–13)
BUN BLD-MCNC: 17 MG/DL (ref 5–21)
BUN/CREAT SERPL: 17.9 (ref 7–25)
CALCIUM SPEC-SCNC: 9.3 MG/DL (ref 8.4–10.4)
CHLORIDE SERPL-SCNC: 109 MMOL/L (ref 98–110)
CO2 SERPL-SCNC: 22 MMOL/L (ref 24–31)
CREAT BLD-MCNC: 0.95 MG/DL (ref 0.5–1.4)
DEPRECATED RDW RBC AUTO: 48.4 FL (ref 40–54)
ERYTHROCYTE [DISTWIDTH] IN BLOOD BY AUTOMATED COUNT: 14.7 % (ref 12–15)
GFR SERPL CREATININE-BSD FRML MDRD: 84 ML/MIN/1.73
GLUCOSE BLD-MCNC: 86 MG/DL (ref 70–100)
HCT VFR BLD AUTO: 27.4 % (ref 40–52)
HGB BLD-MCNC: 9.2 G/DL (ref 14–18)
MCH RBC QN AUTO: 30.2 PG (ref 28–32)
MCHC RBC AUTO-ENTMCNC: 33.6 G/DL (ref 33–36)
MCV RBC AUTO: 89.8 FL (ref 82–95)
PLATELET # BLD AUTO: 493 10*3/MM3 (ref 130–400)
PMV BLD AUTO: 10.2 FL (ref 6–12)
POTASSIUM BLD-SCNC: 3.9 MMOL/L (ref 3.5–5.3)
RBC # BLD AUTO: 3.05 10*6/MM3 (ref 4.8–5.9)
SODIUM BLD-SCNC: 142 MMOL/L (ref 135–145)
WBC NRBC COR # BLD: 23.3 10*3/MM3 (ref 4.8–10.8)

## 2017-07-17 PROCEDURE — 94799 UNLISTED PULMONARY SVC/PX: CPT

## 2017-07-17 PROCEDURE — 97116 GAIT TRAINING THERAPY: CPT

## 2017-07-17 PROCEDURE — 92507 TX SP LANG VOICE COMM INDIV: CPT

## 2017-07-17 PROCEDURE — 97110 THERAPEUTIC EXERCISES: CPT

## 2017-07-17 PROCEDURE — 63710000001 FLINTSTONES COMPLETE 60 MG CHEWABLE TABLET: Performed by: INTERNAL MEDICINE

## 2017-07-17 PROCEDURE — 74230 X-RAY XM SWLNG FUNCJ C+: CPT

## 2017-07-17 PROCEDURE — 92611 MOTION FLUOROSCOPY/SWALLOW: CPT

## 2017-07-17 PROCEDURE — 85027 COMPLETE CBC AUTOMATED: CPT | Performed by: FAMILY MEDICINE

## 2017-07-17 PROCEDURE — 94760 N-INVAS EAR/PLS OXIMETRY 1: CPT

## 2017-07-17 PROCEDURE — G8996 SWALLOW CURRENT STATUS: HCPCS

## 2017-07-17 PROCEDURE — 99231 SBSQ HOSP IP/OBS SF/LOW 25: CPT | Performed by: NURSE PRACTITIONER

## 2017-07-17 PROCEDURE — 31502 CHANGE OF WINDPIPE AIRWAY: CPT | Performed by: NURSE PRACTITIONER

## 2017-07-17 PROCEDURE — 25010000002 ENOXAPARIN PER 10 MG: Performed by: FAMILY MEDICINE

## 2017-07-17 PROCEDURE — G8997 SWALLOW GOAL STATUS: HCPCS

## 2017-07-17 PROCEDURE — 80048 BASIC METABOLIC PNL TOTAL CA: CPT | Performed by: FAMILY MEDICINE

## 2017-07-17 PROCEDURE — 97530 THERAPEUTIC ACTIVITIES: CPT

## 2017-07-17 RX ORDER — COLCHICINE 0.6 MG/1
0.6 TABLET ORAL EVERY 12 HOURS SCHEDULED
Status: DISCONTINUED | OUTPATIENT
Start: 2017-07-17 | End: 2017-07-19 | Stop reason: HOSPADM

## 2017-07-17 RX ORDER — LOSARTAN POTASSIUM 50 MG/1
100 TABLET ORAL
Status: DISCONTINUED | OUTPATIENT
Start: 2017-07-18 | End: 2017-07-19 | Stop reason: HOSPADM

## 2017-07-17 RX ORDER — INDOMETHACIN 75 MG/1
75 CAPSULE, EXTENDED RELEASE ORAL 2 TIMES DAILY WITH MEALS
Status: DISCONTINUED | OUTPATIENT
Start: 2017-07-17 | End: 2017-07-19 | Stop reason: HOSPADM

## 2017-07-17 RX ORDER — DIAZEPAM 2 MG/1
2 TABLET ORAL EVERY 12 HOURS SCHEDULED
Status: DISCONTINUED | OUTPATIENT
Start: 2017-07-17 | End: 2017-07-18

## 2017-07-17 RX ADMIN — CARVEDILOL 25 MG: 25 TABLET, FILM COATED ORAL at 21:28

## 2017-07-17 RX ADMIN — INDOMETHACIN 75 MG: 75 CAPSULE, EXTENDED RELEASE ORAL at 17:21

## 2017-07-17 RX ADMIN — METRONIDAZOLE 500 MG: 500 TABLET, FILM COATED ORAL at 21:28

## 2017-07-17 RX ADMIN — ALBUTEROL SULFATE 2.5 MG: 2.5 SOLUTION RESPIRATORY (INHALATION) at 11:29

## 2017-07-17 RX ADMIN — INDOMETHACIN 75 MG: 75 CAPSULE, EXTENDED RELEASE ORAL at 11:11

## 2017-07-17 RX ADMIN — CARVEDILOL 25 MG: 25 TABLET, FILM COATED ORAL at 09:12

## 2017-07-17 RX ADMIN — METRONIDAZOLE 500 MG: 500 TABLET, FILM COATED ORAL at 14:16

## 2017-07-17 RX ADMIN — FAMOTIDINE 20 MG: 20 TABLET, FILM COATED ORAL at 09:13

## 2017-07-17 RX ADMIN — HYDROCODONE BITARTRATE AND ACETAMINOPHEN 1 TABLET: 5; 325 TABLET ORAL at 09:12

## 2017-07-17 RX ADMIN — ENOXAPARIN SODIUM 40 MG: 40 INJECTION SUBCUTANEOUS at 09:12

## 2017-07-17 RX ADMIN — HYDROCODONE BITARTRATE AND ACETAMINOPHEN 1 TABLET: 10; 325 TABLET ORAL at 06:25

## 2017-07-17 RX ADMIN — COLCHICINE 0.6 MG: 0.6 TABLET, FILM COATED ORAL at 11:11

## 2017-07-17 RX ADMIN — POTASSIUM CHLORIDE 40 MEQ: 750 CAPSULE, EXTENDED RELEASE ORAL at 09:13

## 2017-07-17 RX ADMIN — DEXTROMETHORPHAN 30 MG: 30 SUSPENSION, EXTENDED RELEASE ORAL at 09:13

## 2017-07-17 RX ADMIN — DEXTROMETHORPHAN 30 MG: 30 SUSPENSION, EXTENDED RELEASE ORAL at 17:21

## 2017-07-17 RX ADMIN — FAMOTIDINE 20 MG: 20 TABLET, FILM COATED ORAL at 21:28

## 2017-07-17 RX ADMIN — POTASSIUM CHLORIDE 40 MEQ: 750 CAPSULE, EXTENDED RELEASE ORAL at 17:21

## 2017-07-17 RX ADMIN — COLCHICINE 0.6 MG: 0.6 TABLET, FILM COATED ORAL at 21:28

## 2017-07-17 RX ADMIN — HYDROCODONE BITARTRATE AND ACETAMINOPHEN 1 TABLET: 10; 325 TABLET ORAL at 13:45

## 2017-07-17 RX ADMIN — DIAZEPAM 2 MG: 2 TABLET ORAL at 21:28

## 2017-07-17 RX ADMIN — DIAZEPAM 2 MG: 2 TABLET ORAL at 06:25

## 2017-07-17 RX ADMIN — ALBUTEROL SULFATE 2.5 MG: 2.5 SOLUTION RESPIRATORY (INHALATION) at 19:18

## 2017-07-17 RX ADMIN — LEVETIRACETAM 1000 MG: 500 TABLET, FILM COATED ORAL at 21:28

## 2017-07-17 RX ADMIN — Medication 1 TABLET: at 13:46

## 2017-07-17 RX ADMIN — LEVETIRACETAM 1000 MG: 500 TABLET, FILM COATED ORAL at 09:12

## 2017-07-17 RX ADMIN — AMLODIPINE BESYLATE 10 MG: 10 TABLET ORAL at 09:12

## 2017-07-17 RX ADMIN — LEVOFLOXACIN 750 MG: 750 TABLET, FILM COATED ORAL at 09:14

## 2017-07-17 RX ADMIN — LOSARTAN POTASSIUM 50 MG: 50 TABLET ORAL at 09:12

## 2017-07-17 RX ADMIN — HYDROCODONE BITARTRATE AND ACETAMINOPHEN 1 TABLET: 5; 325 TABLET ORAL at 16:28

## 2017-07-17 RX ADMIN — METRONIDAZOLE 500 MG: 500 TABLET, FILM COATED ORAL at 06:25

## 2017-07-17 NOTE — PAYOR COMM NOTE
"Nishant Wahl (52 y.o. Male)     Date of Birth Social Security Number Address Home Phone MRN    1965  [address] [facility]  [place] [address] [phone] [id number]    Latter-day Marital Status          [facility] Single       Admission Date Admission Type Admitting Provider Attending Provider Department, Room/Bed    7/3/17 Emergency Jairon Perez DO Hancock, John C, DO [facility] 4C, 479/1    Discharge Date Discharge Disposition Discharge Destination                      Attending Provider: Jairon Perez DO     Allergies:  Lipitor [Atorvastatin]    Isolation:  Spore   Infection:  C.difficile (07/07/17)   Code Status:  FULL    Ht:  67\" (170.2 cm)   Wt:  210 lb 11.2 oz (95.6 kg)    Admission Cmt:  None   Principal Problem:  None                Active Insurance as of 7/3/2017     Primary Coverage     Payor Plan Insurance Group Employer/Plan Group    [facility]      Payor Plan Address Payor Plan Phone Number Effective From Effective To    [address] [address]  7/3/2017     [place] [address]       Subscriber Name Subscriber Birth Date Member ID       NISHANT WAHL 1965 [id number]                 Emergency Contacts      (Rel.) Home Phone Work Phone Mobile Phone    Yvette Pollack [phone] -- [phone]           Progress Notes  Date of Service: 7/16/2017 7:32 AM  Gonzalo Petersen MD   Pulmonology      []Hide copied text       PULMONARY AND CRITICAL CARE PROGRESS NOTE - [facility]     Patient: Nishant Wahl 1965 [facility] [id number] [id number]  07/16/17 8:32 AM Referring Provider: Jairon Perez DO     Chief Complaint: S/p tracheostomy      Interval history: He's on room air, plugging his trach with strong speech. He say's he feels great and is glad to be able to eat some, he has no new complaints. He has family members at bedside visiting this morning. He looks well enough to go to the floor. No other " aggravating or allevitating factors.       Meds:     albuterol 2.5 mg Nebulization Q6H - RT   amLODIPine 10 mg Per G Tube Q24H   carvedilol 25 mg Oral Q12H   CloNIDine 1 patch Transdermal Weekly   dextromethorphan polistirex ER 30 mg Oral BID   diazePAM 2 mg Nasogastric Q8H   enoxaparin 40 mg Subcutaneous Daily   famotidine 20 mg Per G Tube Q12H   flintstones complete 1 tablet Oral Daily   levETIRAcetam 1,000 mg Oral Q12H   levoFLOXacin 750 mg Oral Q24H   losartan 50 mg Oral Q24H   metroNIDAZOLE 500 mg Per G Tube Q8H   potassium chloride 40 mEq Oral BID With Meals         Review of Systems:   Review of Systems - History obtained from the patient  General ROS: negative  ENT ROS: negative  Respiratory ROS: no cough, shortness of breath, or wheezing  Cardiovascular ROS: no chest pain or dyspnea on exertion  Gastrointestinal ROS: no abdominal pain, change in bowel habits, or black or bloody stools  Musculoskeletal ROS: positive for - gait disturbance and muscular weakness  Neurological ROS: negative  Dermatological ROS: negative     Physical Exam:  Temp: [97.6 °F (36.4 °C)-99.4 °F (37.4 °C)] 97.6 °F (36.4 °C)  Heart Rate: [78-95] 87  Resp: [15-24] 15  BP: (103-176)/() 139/89     Intake/Output Summary (Last 24 hours) at 07/16/17 0832  Last data filed at 07/16/17 0500    Gross per 24 hour   Intake 295 ml   Output 250 ml   Net 45 ml          SpO2 Readings from Last 3 Encounters:   07/16/17 100%   09/30/16 98%   02/04/16 96%      Physical Exam   Gen: Awake and alert, very interactive.   HEENT: Normocephalic and atraumatic. Voice is strong when patient plugs trach.   Neck: Supple, trach in place with some scant secretions.   Respiratory: Good air movement throughout. No distress.   Cardiac: regular rate and rhythm. No murmur or gallop noted.   Abdomen: non tender, soft, bowel sounds present.   Neurologic: He is alert and following all commands.   Psychiatric: Alert and oriented.   Dermatologic: No rash  noted.  Extremities: Moves all extremities, some generalized weakness to legs.   Musculoskeletal: No joint deformities noted.  Lymphatic: No adenopathy palpated     Results from last 7 days  Lab Units 07/16/17  0230 07/15/17  0143 07/14/17  0257   WBC 10*3/mm3 19.16* 16.11* 16.14*   HEMOGLOBIN g/dL 11.3* 11.2* 10.9*   PLATELETS 10*3/mm3 357 366 400         Results from last 7 days  Lab Units 07/16/17  0230 07/15/17  0143 07/14/17  0257   SODIUM mmol/L 143 146* 147*   POTASSIUM mmol/L 3.6 3.4* 2.9*   BUN mg/dL 18 18 24*   CREATININE mg/dL 0.95 0.90 0.99         Results from last 7 days  Lab Units 07/15/17  0217 07/14/17  0225 07/13/17  0317 07/12/17  0310   PH, ARTERIAL pH units 7.435 7.444 7.441 7.498*   PCO2, ARTERIAL mm Hg 35.4 33.7* 38.8 36.6   PO2 ART mm Hg 85.1 75.2* 105.3* 76.8*   FIO2 % --  40 40 40          Recent films:  Films reviewed personally by me. My interpretation: No new today     Pulmonary Assessment:  1. Respiratory failure related to agitation from ethanol withdrawal-now resolved   2. Malignant HTN-persistent  3. History of chronic ethanol use  4. C-diff   5. Tongue hematoma-improved  6. MSSA respiratory culture   7. S/p tracheostomy placement -7-12-17, very stable   -  Recommend:   · Can proceed with PMV trials today   · Continue nebs.   · Continues to make excellent progress  · Okay to floor  · Pulmonology will sign off.      Electronically signed by EPI Lincoln on 7/16/2017 at 8:32 AM        Physician substantive contribution:  Pertinent symptoms/interval history include: He has no pulmonary complaints. Good voicing with trach occlusion  Respiratory exam shows pertinent findings of:diminished breath sounds, stable, no accessory mm use.  Plan includes: ok to floor, supportive care, pmv. On room air, stable.  I have seen and examined patient personally, performing a face-to-face diagnostic evaluation with plan of care reviewed and developed with APRN and nursing staff. I have  "addended and/or modified the above history of present illness, physical examination, and assessment and plan to reflect my findings and impressions. Essential elements of the care plan were discussed with APRN above. Agree with findings and assessment/plan as documented above.     Electronically signed by Gonzalo Petersen MD, on 7/16/2017, 1:19 PM                       ED to Hosp-Admission (Current) on 7/3/2017              Revision History              Detailed Report            Progress Notes  Date of Service: 7/16/2017 8:09 AM  Jairon Perez DO   Medicine      []Hide copied text       [facility] Medicine Services  INPATIENT PROGRESS NOTE     Length of Stay: 12  Date of Admission: 7/3/2017  Primary Care Physician: Linda Hoffman, DNP, APRN     Subjective   Chief Complaint: weakness  HPI   His mom says he is \"back to normal.\" He Tells me that he feels very well. His blood pressure has improved. He was cleared for a diet by speech therapy and has eaten well.     He complains of symmetrical pain to his bilateral lower extremities on the tops of his feet in the extensor surfaces of his legs. I explained to he and his mother this is likely related to being in a moribound state recently. He needs to increase activity and conduct physical therapy.     Review of Systems   All pertinent negatives and positives are as above. All other systems have been reviewed and are negative unless otherwise stated.      Objective    Temp: [97.6 °F (36.4 °C)-99.4 °F (37.4 °C)] 97.6 °F (36.4 °C)  Heart Rate: [78-95] 87  Resp: [15-24] 15  BP: (103-176)/() 139/89  Physical Exam  Constitutional: No distress.   Seen and discussed with his nurse, [place]. His mother is present. He is calm, polite, conversant with plugging his tracheostomy. PICC.    Head: Normocephalic and atraumatic.   Eyes: Pupils are equal, round, and reactive to light.   Neck: Neck supple. No JVD present. Trach clean. "   Cardiovascular: Normal rate and regular rhythm.   Pulmonary/Chest: Effort normal and breath sounds normal. On room air via trach.    Abdominal: Soft. Bowel sounds are normal.   Musculoskeletal: He exhibits edema (trace).   Neurological: He is appropriate. He is ambulatory with assistance.   Skin: Skin is warm and dry.      Results Review:  I have reviewed the labs, radiology results, and diagnostic studies.     Laboratory Data:      Results from last 7 days  Lab Units 07/16/17  0230 07/15/17  0143 07/14/17  0257   WBC 10*3/mm3 19.16* 16.11* 16.14*   HEMOGLOBIN g/dL 11.3* 11.2* 10.9*   HEMATOCRIT % 34.0* 33.8* 32.8*   PLATELETS 10*3/mm3 357 366 400         Results from last 7 days  Lab Units 07/16/17  0230 07/15/17  0143 07/14/17  0257   07/10/17  0338   SODIUM mmol/L 143 146* 147* < > 144   POTASSIUM mmol/L 3.6 3.4* 2.9* < > 4.1   CHLORIDE mmol/L 110 110 111* < > 111*   CO2 mmol/L 23.0* 25.0 24.0 < > 24.0   BUN mg/dL 18 18 24* < > 13   CREATININE mg/dL 0.95 0.90 0.99 < > 0.98   CALCIUM mg/dL 8.8 8.8 9.1 < > 8.6   BILIRUBIN mg/dL --  --  --  --  0.5   ALK PHOS U/L --  --  --  --  155*   ALT (SGPT) U/L --  --  --  --  42   AST (SGOT) U/L --  --  --  --  51*   GLUCOSE mg/dL 131* 93 105* < > 182*   < > = values in this interval not displayed.     Radiology Data:   Imaging Results (last 24 hours)     Procedure Component Value Units Date/Time     XR Chest 1 View [[id number]] Collected: 07/15/17 1012       Updated: 07/15/17 1017     Narrative:       HISTORY: Respiratory failure, intubated      CXR: A frontal view the chest is obtained.      Comparison: 07/14/2017      Findings: Tracheostomy tube is appropriate in position. The prior  enteric tube has been removed. A new right upper extremity PICC line is  in place with the tip near the atrial caval junction.      There is a diminished level of inspiration. There is mild elevation of  the right hemidiaphragm. There are left lower lobe opacities. There is a  questionable  small left pleural effusion. There is no pneumothorax. The  cardiomediastinal contours are similar         Impression:       1. Removal of the enteric tube with placement of a right upper extremity  PICC line. Tracheostomy tube appropriate in position.  2. Left lower lobe opacities may be patchy atelectasis. Consolidation  considered.       This report was finalized on 07/15/2017 10:14 by Dr. Sri Rivera MD.         I have reviewed the patient current medications.      Assessment/Plan   Assessment:   1. Possible angioedema related to lisinopril.  2. Malignant hypertension.  3. Possible new onset seizure disorder versus convulsive syncope.  4. Posterior reversible encephalopathy syndrome on MRI.  5. Tongue bite with sublingual hematoma.  6. Tobacco abuse.  7. Obstructive sleep apnea, noncompliant with device.  8. Gouty arthritis.  9. Daily alcohol use with Delirium tremens requiring sedation and mechanical ventilation.  10. Hypokalemia.   11. Clostridium difficile colitis.  12. Possible lingual or sublingual infection, MSSA on culture.  13. Bilateral lower lobe infiltrates consistent with ventilator associated pneumonia combined with atelectasis.      Plan:  His blood pressure seems much improved over last 24 hours. Still a few hypertensive numbers, but overall doing much better. Changed metoprolol to carvedilol on 7/14. Placed on losartan on 7/14. There was some question as to whether or not he had tongue swelling on lisinopril. However, angiotensin receptor blockers should be okay as he needs these for blood pressure control. Continue amlodipine and clonidine patch.       He will remain on Keppra for at least 6-8 weeks per the neurology service.       He has been on broad-spectrum antibiotics for potential pneumonia since the 11th. The Staph in his sputum is MSSA. Discontinued vancomycin on 7/14. Continue with Zosyn and Levaquin. He is also completing Flagyl Clostridium difficile toxin present in his stool. No  recent stools.      Pulmonology seperated from ventilatory support during the afternoon of .  He is now on room air and doing very well with his tracheostomy. ENT hopefully can consider decannulation in the near future.      Replace potassium aggressively. Recent magnesium level is 2.1.      Speech therapy cleared for soft foods with nectar thick liquids.  Continue physical and occupational therapy.     To the floor today.       Discharge Planning: Given his vast improvement, I do not believe that he will require LTAC placement. His mother would like to take him home to her house with home health at discharge. I think that that would be completely reasonable.      Jairon Perez,    17   8:09 AM              ED to Hosp-Admission (Current) on 7/3/2017              Detailed Report            Plan of Care  Date of Service: 2017 9:06 AM  Flor Camp PTA   Physical Therapy      Problem: Patient Care Overview (Adult)  Goal: Plan of Care Review  Outcome: Ongoing (interventions implemented as appropriate)     17 0904   Coping/Psychosocial Response Interventions   Plan Of Care Reviewed With patient   Patient Care Overview   Progress declining   Outcome Evaluation   Outcome Summary/Follow up Plan Pt unable to walk farther than 15 ft today due to pain in B feet. Pt was cga with RW. Pt did perform sitting ex's x 10 reps.                      ED to Hosp-Admission (Current) on 7/3/2017              Detailed Report            Therapy Treatment Note  Date of Service: 2017 9:07 AM  Flor Camp PTA   Physical Therapy      []Hide copied text  []Hover for attribution information  Acute Care - Physical Therapy Treatment Note  [facility]     Patient Name: Nishant Savage  : 1965  MRN: [id number]  Today's Date: 2017  Onset of Illness/Injury or Date of Surgery Date: 17  Date of Referral to PT: 17  Referring Physician: Dr. Perez    Admit Date: 7/3/2017     Visit Dx:       ICD-10-CM ICD-9-CM   1. Oropharyngeal dysphagia R13.12 787.22   2. Seizures R56.9 780.39   3. Angioedema, sequela T78.3XXS 909.9   4. Alcohol withdrawal, with delirium F10.231 291.0   5. Impaired mobility Z74.09 799.89   6. Voice absence R49.1 784.41          Patient Active Problem List   Diagnosis   • Hyperlipidemia   • HTN (hypertension)   • Gout   • Anxiety   • Arthritis   • Erectile dysfunction   • Angio-edema   • Seizures   • HCAP (healthcare-associated pneumonia)                    Adult Rehabilitation Note        07/17/17 0820 07/16/17 1055 07/15/17 1130            Rehab Assessment/Intervention     Discipline physical therapy assistant  -AE speech language pathologist  -KW       Document Type therapy note (daily note)  -AE         Subjective Information agree to therapy;complains of;pain  -AE no complaints  -KW       Patient Effort, Rehab Treatment   excellent  -KW       Precautions/Limitations fall precautions   c-diff, trach  -AE         Recorded by [AE] Flor Camp PTA [KW] Daniela Bentley MS CCC-SLP       Pain Assessment     Pain Assessment 0-10  -AE         Pain Score 8  -AE         Pain Location Foot  -AE         Pain Orientation Left;Right  -AE         Pain Descriptors Aching;Burning  -AE         Pain Frequency Intermittent  -AE         Recorded by [AE] Flor Camp PTA         Dysphagia/Swallow Intervention     Dysphagia/Swallow Therapeutic Feed   Took trials of thin liquids. Difficulty to determine safety. No overt coughing but some wet voice vs harsh vocal quality. Will complete Dysphagia Study Monday to determine safet diet consistency.  -KW Patient will tolerate upgraded diet consistency with SLP w/ no overt s/s of aspiration.  -KW     Recorded by   [KW] Daniela Bentley MS CCC-SLP [KW] Daniela Bentley MS CCC-SLP     Bed Mobility, Assessment/Treatment     Bed Mobility, Comment up in chair   assisted with dressing  -AE         Recorded by [AE] Flor Camp PTA         Transfer  Assessment/Treatment     Transfers, Sit-Stand [place] minimum assist (75% patient effort);verbal cues required  -AE         Transfers, Stand-Sit [place] contact guard assist;verbal cues required  -AE         Recorded by [AE] Flor Camp PTA         Gait Assessment/Treatment     Gait, [place] Level contact guard assist  -AE         Gait, Assistive Device rolling walker  -AE         Gait, Distance (Feet) 15   Pt in too much pain to walk  -AE         Gait, Gait Pattern Analysis swing-to gait  -AE         Gait, Gait Deviations bilateral:;mile decreased;antalgic  -AE         Gait, Safety Issues step length decreased  -AE         Gait, Impairments strength decreased  -AE         Recorded by [AE] Flor Camp PTA         Therapy Exercises     Bilateral Lower Extremities AROM:;10 reps;sitting  -AE         Recorded by [AE] Flor Camp PTA         Sensory Assessment/Intervention     Sensory Impairment hypersensitivity   B LE More on L  -AE         Recorded by [AE] Flor Camp PTA         Positioning and Restraints     Pre-Treatment Position bathroom  -AE         Post Treatment Position bed  -AE         In Bed sitting EOB;call light within reach  -AE         Recorded by [AE] Flor Camp PTA           07/15/17 0836                Rehab Assessment/Intervention     Discipline physical therapy assistant  -MF         Treatment Not Performed other (see comments)  -MF         Treatment Not Performed, Comment Pt. off vent and is currently up with nurisng on toilet. Notified nursing that pt. will need new orders and re-mayte to continue PT therapy.  -MF         Recorded by [MF] Uma Restrepo PTA             User Key  (r) = Recorded By, (t) = Taken By, (c) = Cosigned By    Initials Name Effective Dates     AE Flor Camp PTA 06/22/15 -      KW Daniela Bentley MS CCC-SLP 08/02/16 -       Uma Restrepo PTA 08/02/16 -                     IP PT Goals        07/16/17 1441 07/10/17 0810        Bed Mobility PT LTG     Bed Mobility PT LTG, Date Established 07/16/17  -MARJORIE 07/10/17  -MARJORIE       Bed Mobility PT LTG, Time to Achieve by discharge  -MARJORIE by discharge  -MARJORIE       Bed Mobility PT LTG, Activity Type all bed mobility  -MARJORIE roll left/roll right  -MARJORIE       Bed Mobility PT LTG, [place] Level independent  -MARJORIE moderate assist (50% patient effort)  -MARJORIE       Bed Mobility PT Goal LTG, Assist Device bed rails  -MARJORIE bed rails  -MARJORIE       Bed Mobility PT LTG, Date Goal Reviewed 07/16/17  -MARJORIE         Bed Mobility PT LTG, Outcome goal revised  -MARJORIE         Bed Mobility PT LTG, Reason Goal Not Met goals revised this date   d/c 7/10 goal, goal updated  -MARJORIE         Transfer Training PT LTG     Transfer Training PT LTG, Date Established 07/16/17  -MARJORIE         Transfer Training PT LTG, Time to Achieve by discharge  -MARJORIE         Transfer Training PT LTG, Activity Type bed to chair /chair to bed;sit to stand/stand to sit  -MARJORIE         Transfer Training PT LTG, [place] Level conditional independence;independent  -MARJORIE         Transfer Training PT LTG, Assist Device walker, rolling  -MARJORIE         Gait Training PT LTG     Gait Training Goal PT LTG, Date Established 07/16/17  -MARJORIE         Gait Training Goal PT LTG, Time to Achieve by discharge  -MARJORIE         Gait Training Goal PT LTG, [place] Level supervision required;conditional independence  -MARJORIE         Gait Training Goal PT LTG, Assist Device walker, rolling  -MARJORIE         Gait Training Goal PT LTG, Distance to Achieve 200  -MARJORIE         Stair Training PT LTG     Stair Training Goal PT LTG, Date Established 07/16/17  -MARJORIE         Stair Training Goal PT LTG, Time to Achieve by discharge  -MARJORIE         Stair Training Goal PT LTG, Number of Steps 3  -MARJORIE         Stair Training Goal PT LTG, [place] Level contact guard assist  -MARJORIE         Stair Training Goal PT LTG, Distance to Achieve Attempt stairs if pt. to d/c home  -MARJORIE         Strength Goal PT LTG     Strength Goal PT LTG,  Date Established   07/10/17  -MARJORIE       Strength Goal PT LTG, Time to Achieve   by discharge  -MARJORIE       Strength Goal PT LTG, Measure to Achieve   AAROM/AROM, B UE/LE, 10-20 reps, min assist  -MARJORIE       Strength Goal PT LTG, Date Goal Reviewed 07/16/17  -MARJORIE         Strength Goal PT LTG, Outcome goal partially met  -MARJORIE         Strength Goal PT LTG, Reason Goal Not Met progress slower than expected  -MARJORIE         Physical Therapy PT LTG     Physical Therapy PT LTG, Date Established   07/10/17  -MARJORIE       Physical Therapy PT LTG, Time to Achieve   by discharge  -MARJORIE       Physical Therapy PT LTG, Activity Type   No new evidence of skin breakdown or contractures while pt. on the vent.  -MARJORIE       Physical Therapy PT LTG, Date Goal Reviewed 07/16/17  -MARJORIE         Physical Therapy PT LTG, Outcome goal met  -MARJORIE         Physical Therapy PT LTG, Reason Goal Not Met goals revised this date   d/c goal  -MARJORIE              User Key  (r) = Recorded By, (t) = Taken By, (c) = Cosigned By    Initials Name Provider Type     MARJORIE Olsen, PT DPT Physical Therapist                Physical Therapy Education           Title: PT OT SLP Therapies (Active)                Topic: Physical Therapy (Active)           Point: Mobility training (Active)      Learning Progress Summary               Learner Readiness Method Response Comment Documented by Status             Patient Acceptance E NR t/f's benefits of activity AE 07/17/17 0901 Active      Acceptance E LAZARO,KURT,NR Educated pt/family on progression of PT POC and benefits of activity MARJORIE 07/16/17 1441 Done      Acceptance E,D DU Benefits of activity;ex's SUKHDEEP 07/14/17 0920 Done      Acceptance E,D NR Benefit of activities; ex's  07/13/17 0847 Active      Nonacceptance E NL Pt. sedated on vent, no evidence of learning. MF 07/11/17 0905 Active    Family Acceptance E VU,DU,NR Educated pt/family on progression of PT POC and benefits of activity MARJORIE 07/16/17 1441 Done                       Point: Home  exercise program (Active)      Learning Progress Summary               Learner Readiness Method Response Comment Documented by Status             Patient Nonacceptance E NL Pt. sedated on vent, no evidence of learning.  07/12/17 0837 Active      Nonacceptance E NL Pt. sedated on vent, no evidence of learning.  07/11/17 0905 Active      Acceptance E NR Educated pt on progression of PT POC and benefits of activity  07/10/17 0810 Active                       Point: Body mechanics (Active)      Learning Progress Summary               Learner Readiness Method Response Comment Documented by Status             Patient Nonacceptance E NL Pt. sedated on vent, no evidence of learning.  07/11/17 0905 Active                       Point: Precautions (Active)      Learning Progress Summary               Learner Readiness Method Response Comment Documented by Status             Patient Nonacceptance E NL Pt. sedated on vent, no evidence of learning.  07/11/17 0905 Active                                         User Key               Initials Effective Dates Name Provider Type Discipline               AE 06/22/15 -  Flor Camp, PTA Physical Therapy Assistant PT      MARJORIE 08/02/16 -  Jaime Olsen, PT DPT Physical Therapist PT       08/02/16 -  Britt Fuller, PTA Physical Therapy Assistant PT       08/02/16 -  Uma Restrepo, PTA Physical Therapy Assistant PT                            PT Recommendation and Plan  Anticipated Discharge Disposition: home with assist, home with home health, home with 24/7 care, transitional care, skilled nursing facility, inpatient rehabilitation facility (pending progress)  Planned Therapy Interventions: bed mobility training, transfer training, gait training, balance training, home exercise program, patient/family education, postural re-education, stair training, strengthening  PT Frequency: daily, 2 times/day  Plan of Care Review  Plan Of Care Reviewed With:  patient  Progress: declining  Outcome Summary/Follow up Plan: Pt unable to walk farther than 15 ft today due to pain. Pt was cga with RW. Pt did performm sitting ex's              Outcome Measures        07/17/17 0820 07/16/17 1441              How much help from another person do you currently need...     Turning from your back to your side while in flat bed without using bedrails? 3  -AE 3  -MARJORIE       Moving from lying on back to sitting on the side of a flat bed without bedrails? 3  -AE 3  -MARJORIE       Moving to and from a bed to a chair (including a wheelchair)? 3  -AE 3  -MARJORIE       Standing up from a chair using your arms (e.g., wheelchair, bedside chair)? 3  -AE 3  -MARJORIE       Climbing 3-5 steps with a railing? 2  -AE 2  -MARJORIE       To walk in hospital room? 3  -AE 3  -MARJORIE       AM-PAC 6 Clicks Score 17  -AE 17  -MARJORIE       Functional Assessment     Outcome Measure Options AM-PAC 6 Clicks Basic Mobility (PT)  -AE AM-PAC 6 Clicks Basic Mobility (PT)  -MARJORIE            User Key  (r) = Recorded By, (t) = Taken By, (c) = Cosigned By    Initials Name Provider Type     AE Flor Camp PTA Physical Therapy Assistant     MARJORIE Olsen, PT DPT Physical Therapist            Time Calculation:           PT Charges        07/17/17 0902                Time Calculation     Start Time 0820  -AE         Stop Time 0900  -AE         Time Calculation (min) 40 min  -AE         PT Received On 07/17/17  -AE         PT Goal Re-Cert Due Date 07/26/17  -AE         Time Calculation- PT     Total Timed Code Minutes- PT 40 minute(s)  -AE              User Key  (r) = Recorded By, (t) = Taken By, (c) = Cosigned By    Initials Name Provider Type     AE Flor Camp PTA Physical Therapy Assistant            Therapy Charges for Today     Code Description Service Date Service Provider Modifiers Qty     95551943843 HC GAIT TRAINING EA 15 MIN 7/17/2017 Flor Camp PTA GP 1     46530094793 HC PT THERAPEUTIC ACT EA 15 MIN 7/17/2017 Flor Camp  PTA GP 1     99218593818 HC PT THER PROC EA 15 MIN 7/17/2017 Flor Camp, PTA GP 1            PT G-Codes  Outcome Measure Options: AM-PAC 6 Clicks Basic Mobility (PT)  Score: 17  Functional Limitation: Mobility: Walking and moving around  Mobility: Walking and Moving Around Current Status (): At least 40 percent but less than 60 percent impaired, limited or restricted  Mobility: Walking and Moving Around Goal Status (): At least 1 percent but less than 20 percent impaired, limited or restricted     Flor Camp PTA  7/17/2017                ED to Hosp-Admission (Current) on 7/3/2017              Detailed Report         Basic Metabolic Panel [LAB15] (Order [id number])   Lab   Date: 7/17/2017 Department: [address] Released By: Cristino Encinas DO (auto-released) Authorizing: Jairon Perez DO          Basic Metabolic Panel   Order: [id number]   Status:  Final result   Visible to patient:  No (Not Released)      Ref Range & Units 3:43 AM     Glucose 70 - 100 mg/dL 86   BUN 5 - 21 mg/dL 17   Comments: Specimen hemolyzed. Results may be affected.   Creatinine 0.50 - 1.40 mg/dL 0.95   Sodium 135 - 145 mmol/L 142   Potassium 3.5 - 5.3 mmol/L 3.9   Comments: Specimen hemolyzed.  Results may be affected.   Chloride 98 - 110 mmol/L 109   CO2 24.0 - 31.0 mmol/L 22.0 (L)   Calcium 8.4 - 10.4 mg/dL 9.3   eGFR Non African Amer >60 mL/min/1.73 84   BUN/Creatinine Ratio 7.0 - 25.0 17.9   Anion Gap 4.0 - 13.0 mmol/L 11.0   Resulting Agency  [facility]   Narrative   GFR Normal >60  Chronic Kidney Disease <60  Kidney Failure <15      Specimen Collected: 07/17/17  3:43 AM Last Resulted: 07/17/17  5:13 AM                              CBC (No Diff) [WNQ533] (Order [id number])   Lab   Date: 7/17/2017 Department: [address] Released By: Cristino Encinas DO (auto-released) Authorizing: Jairon Perez DO          CBC (No Diff)   Order: [id number]   Status:  Final result   Visible to  patient:  No (Not Released)      Ref Range & Units 3:43 AM     WBC 4.80 - 10.80 10*3/mm3 23.30 (H)   RBC 4.80 - 5.90 10*6/mm3 3.05 (L)   Hemoglobin 14.0 - 18.0 g/dL 9.2 (L)   Hematocrit 40.0 - 52.0 % 27.4 (L)   MCV 82.0 - 95.0 fL 89.8   MCH 28.0 - 32.0 pg 30.2   MCHC 33.0 - 36.0 g/dL 33.6   RDW 12.0 - 15.0 % 14.7   RDW-SD 40.0 - 54.0 fl 48.4   MPV 6.0 - 12.0 fL 10.2   Platelets 130 - 400 10*3/mm3 493 (H)   Resulting Agency   [facility]      Specimen Collected: 07/17/17  3:43 AM Last Resulted: 07/17/17  4:42 AM                       Related Result Highlights                Hospital Medications (active)       Dose Frequency Start End    acetaminophen (TYLENOL) tablet 650 mg 650 mg Every 4 Hours PRN 7/3/2017     Sig - Route: Take 2 tablets by mouth Every 4 (Four) Hours As Needed for Mild Pain (1-3). - Oral    albuterol (PROVENTIL) nebulizer solution 0.083% 2.5 mg/3mL 2.5 mg Every 6 Hours - RT 7/4/2017     Sig - Route: Take 2.5 mg by nebulization Every 6 (Six) Hours. - Nebulization    amLODIPine (NORVASC) tablet 10 mg 10 mg Every 24 Hours Scheduled 7/14/2017     Sig - Route: 1 tablet by Per G Tube route Daily. - Per G Tube    carvedilol (COREG) tablet 25 mg 25 mg Every 12 Hours Scheduled 7/14/2017     Sig - Route: Take 1 tablet by mouth Every 12 (Twelve) Hours. - Oral    CloNIDine (CATAPRES-TTS) 0.3 MG/24HR patch 1 patch 1 patch Weekly 7/11/2017     Sig - Route: Place 1 patch on the skin 1 (One) Time Per Week. - Transdermal    dextromethorphan polistirex ER (DELSYM) 30 MG/5ML oral suspension 30 mg 30 mg 2 Times Daily 7/8/2017     Sig - Route: Take 30 mg by mouth 2 (Two) Times a Day. - Oral    diazePAM (VALIUM) tablet 2 mg 2 mg Every 8 Hours Scheduled 7/14/2017 7/24/2017    Sig - Route: 1 tablet by Nasogastric route Every 8 (Eight) Hours. - Nasogastric    enoxaparin (LOVENOX) syringe 40 mg 40 mg Daily 7/8/2017     Sig - Route: Inject 0.4 mL under the skin Daily. - Subcutaneous    famotidine (PEPCID) tablet 20 mg 20 mg  Every 12 Hours Scheduled 7/10/2017     Sig - Route: 1 tablet by Per G Tube route Every 12 (Twelve) Hours. - Per G Tube    flintstones complete (FLINTSTONES) chewable tablet 1 tablet 1 tablet Daily 7/15/2017     Sig - Route: Chew 1 tablet Daily. - Oral    hydrALAZINE (APRESOLINE) injection 10 mg 10 mg Every 6 Hours PRN 7/15/2017     Sig - Route: Infuse 0.5 mL into a venous catheter Every 6 (Six) Hours As Needed for High Blood Pressure. - Intravenous    HYDROcodone-acetaminophen (NORCO)  MG per tablet 1 tablet 1 tablet Every 4 Hours PRN 7/3/2017 7/22/2017    Sig - Route: Take 1 tablet by mouth Every 4 (Four) Hours As Needed for Severe Pain . - Oral    HYDROcodone-acetaminophen (NORCO) 5-325 MG per tablet 1 tablet 1 tablet Every 4 Hours PRN 7/3/2017 7/22/2017    Sig - Route: Take 1 tablet by mouth Every 4 (Four) Hours As Needed for Moderate Pain . - Oral    ibuprofen (ADVIL,MOTRIN) 100 MG/5ML suspension 200 mg 200 mg Every 6 Hours PRN 7/9/2017     Sig - Route: Take 10 mL by mouth Every 6 (Six) Hours As Needed for Fever. - Oral    levETIRAcetam (KEPPRA) tablet 1,000 mg 1,000 mg Every 12 Hours Scheduled 7/16/2017     Sig - Route: Take 2 tablets by mouth Every 12 (Twelve) Hours. - Oral    levoFLOXacin (LEVAQUIN) tablet 750 mg 750 mg Every 24 Hours 7/16/2017     Sig - Route: Take 1 tablet by mouth Daily. - Oral    LORazepam (ATIVAN) injection 1 mg 1 mg Every 8 Hours PRN 7/13/2017 7/23/2017    Sig - Route: Infuse 0.5 mL into a venous catheter Every 8 (Eight) Hours As Needed for Anxiety. - Intravenous    losartan (COZAAR) tablet 50 mg 50 mg Every 24 Hours Scheduled 7/14/2017     Sig - Route: Take 1 tablet by mouth Daily. - Oral    metroNIDAZOLE (FLAGYL) tablet 500 mg 500 mg Every 8 Hours Scheduled 7/10/2017     Sig - Route: 1 tablet by Per G Tube route Every 8 (Eight) Hours. - Per G Tube    naloxone (NARCAN) injection 0.4 mg 0.4 mg Every 5 Minutes PRN 7/5/2017     Sig - Route: Infuse 1 mL into a venous catheter Every 5  "(Five) Minutes As Needed for Respiratory Depression. - Intravenous    Linked Group 1:  \"And\" Linked Group Details        ondansetron (ZOFRAN) injection 4 mg 4 mg Every 6 Hours PRN 7/3/2017     Sig - Route: Infuse 2 mL into a venous catheter Every 6 (Six) Hours As Needed for Nausea or Vomiting. - Intravenous    potassium chloride (MICRO-K) CR capsule 40 mEq 40 mEq 2 Times Daily With Meals 7/15/2017     Sig - Route: Take 4 capsules by mouth 2 (Two) Times a Day With Meals. - Oral    sodium chloride 0.9 % flush 1-10 mL 1-10 mL As Needed 7/3/2017     Sig - Route: Infuse 1-10 mL into a venous catheter As Needed for Line Care. - Intravenous          "

## 2017-07-17 NOTE — PLAN OF CARE
Problem: Patient Care Overview (Adult)  Goal: Plan of Care Review  Outcome: Ongoing (interventions implemented as appropriate)    07/17/17 0904   Coping/Psychosocial Response Interventions   Plan Of Care Reviewed With patient   Patient Care Overview   Progress declining   Outcome Evaluation   Outcome Summary/Follow up Plan Pt unable to walk farther than 15 ft today due to pain in B feet. Pt was cga with RW. Pt did perform sitting ex's x 10 reps.

## 2017-07-17 NOTE — PLAN OF CARE
Problem: Patient Care Overview (Adult)  Goal: Plan of Care Review  Outcome: Ongoing (interventions implemented as appropriate)    07/17/17 1506   Coping/Psychosocial Response Interventions   Plan Of Care Reviewed With patient;mother   Patient Care Overview   Progress progress toward functional goals as expected   Outcome Evaluation   Outcome Summary/Follow up Plan Pt wore PMV for 30 minutes with no s/s of distress. His oxygen stated at 96% when checked periodically. Pt was able to place and remove valve independently. No backpressure observed with valve removal. Pt may wear valve when someone in room.         Problem: Inpatient SLP  Goal: SLP Additional Goal 1  Outcome: Ongoing (interventions implemented as appropriate)    07/16/17 1139 07/17/17 1506   SLP- Additional Goal 1   Additional Goal 1- SLP, Date Established 07/16/17 --    Additional Goal 1- SLP, Time to Achieve by discharge --    Additional Goal 1- SLP, Activity Level Patient will tolerate PMV without any s/s of dsitress and produce speech over background noise at 3 feet away. --    Additional Goal 1- SLP, Additional Goal Patient will independently place and remove PMV. --    Additional Goal 1- SLP, Date Goal Reviewed --  07/17/17   Additional Goal 1- SLP, Outcome --  goal ongoing

## 2017-07-17 NOTE — MBS/VFSS/FEES
Acute Care - Speech Language Pathology   Swallow Re-Evaluation [facility]     Patient Name: Nishant Savage  : 1965  MRN: [id number]  Today's Date: 2017  Onset of Illness/Injury or Date of Surgery Date: 17     Referring Physician: Dr. Zamarripa      Admit Date: 7/3/2017  SPEECH-LANGUAGE PATHOLOGY EVALUTION - VFSS  Subjective: The patient was seen on this date for a VFSS(Videofluoroscopic Swallowing Study).  Patient was alert and cooperative.    The patient's history is significant for tracheostomy.   Objective: Risks/benefits were reviewed with the patient, and consent was obtained. The study was completed with SLP and Radiologist present. The patient was seen in lateral view(s). Textures given included thin liquid, nectar thick liquid, honey thick liquid, puree consistency, mechanical soft consistency and regular consistency.  Assessment: Difficulties were noted with thin liquid, nectar thick liquid, puree consistency and regular consistency, characterized by decreased base of tongue strength and decreased epiglottic inversion with nectar and thin. He had mild-moderate residue in the vallecula and pyriforms following the first thin liquid trial. He had premature loss to the vallecula secondary to a delay with pudding thick barium. He had sluggish epiglottic inversion with the second trial of thin barium. He also had decreased rotary chew of the regular solid. No definite penetration or aspiration was observed throughout the study.     Silent Thin Nectar Honey Puree Fork Mashed Mech Soft Regular Liquid Wash   Penetration             Aspiration               Residue  X            SLP Findings: Patient presents with functional swallow.   Recommendations: Diet Textures: thin liquid, regular consistency food. Medications should be taken whole with thin liquids.   Recommended Strategies: Upright for PO and small bites and sips. Oral care before breakfast, after all meals and PRN.  Dysphagia therapy is  recommended to follow-up for diet tolerance.  Daniela Heck, CCC-SLP 7/17/2017 2:16 PM    Visit Dx:     ICD-10-CM ICD-9-CM   1. Oropharyngeal dysphagia R13.12 787.22   2. Seizures R56.9 780.39   3. Angioedema, sequela T78.3XXS 909.9   4. Alcohol withdrawal, with delirium F10.231 291.0   5. Impaired mobility Z74.09 799.89   6. Voice absence R49.1 784.41     Patient Active Problem List   Diagnosis   • Hyperlipidemia   • HTN (hypertension)   • Gout   • Anxiety   • Arthritis   • Erectile dysfunction   • Angio-edema   • Seizures   • HCAP (healthcare-associated pneumonia)     Past Medical History:   Diagnosis Date   • Anxiety    • Arthritis    • Gout    • HTN (hypertension)    • Hyperlipidemia      Past Surgical History:   Procedure Laterality Date   • TRACHEOSTOMY N/A 7/12/2017    Procedure: TRACHEOSTOMY WITH TRACHEOSCOPY;  Surgeon: Jairon Pierson MD;  Location: [facility] OR;  Service:           SWALLOW EVALUATION (last 72 hours)      Swallow Evaluation       07/17/17 1321 07/15/17 1130             Rehab Evaluation    Document Type re-evaluation  -MB evaluation  -KW       Subjective Information no complaints;agree to therapy  -MB no complaints  -KW       General Information    Patient Profile Review  yes  -KW       Pertinent History Of Current Problem Trach, off vent now  -MB Trach tube, on vent, DHT now pulled.  -KW       Current Diet Limitations mechanical soft;nectar thick liquids  -MB NPO  -KW       Precautions/Limitations, Vision WFL  -MB WFL  -KW       Precautions/Limitations, Hearing WFL  -MB WFL  -KW       Prior Level of Function- Communication functional in all spheres  -MB functional in all spheres  -KW       Prior Level of Function- Swallowing no diet consistency restrictions  -MB no diet consistency restrictions  -KW       Plans/Goals Discussed With patient  -MB patient;family  -KW       Barriers to Rehab none identified  -MB none identified  -KW       Clinical Impression    Patient's Goals For Discharge  patient did not state  -MB return to all previous roles/activities  -KW       Family Goals For Discharge  patient able to return to all previous activities/roles  -KW       SLP Swallowing Diagnosis other (see comments)   WFL  -MB mild dysphagia  -KW       Rehab Potential/Prognosis, Swallowing good, to achieve stated therapy goals  -MB good, to achieve stated therapy goals  -KW       Criteria for Skilled Therapeutic Interventions Met demonstrates skilled criteria for intervention;other (see comments)   for diet tolerance  -MB demonstrates skilled criteria for intervention  -KW       FCM, Swallowing  4-->Level 4  -KW       Therapy Frequency PRN  -MB 3-5 times/wk  -KW       Predicted Duration Therapy Interv (days) 1-2 days  -MB until discharge  -KW       Expected Duration Therapy Session (min) 15-30 minutes  -MB 15-30 minutes  -KW       SLP Diet Recommendation regular textures;thin liquids  -MB soft textures;ground;nectar/syrup-thick liquids  -KW       Recommended Feeding/Eating Techniques maintain upright posture during/after eating for 30 mins  -MB maintain upright posture during/after eating for 30 mins;small sips/bites  -KW       SLP Rec. for Method of Medication Administration meds whole with thin liquid  -MB meds whole with thickened liquid;meds whole in pudding/applesauce  -KW       Monitor For Signs Of Aspiration cough;gurgly voice;throat clearing;pneumonia  -MB pneumonia;right lower lobe infiltrates  -KW       Anticipated Discharge Disposition home  -MB home with assist  -KW       Pain Assessment    Pain Assessment  No/denies pain  -KW       Cognitive Assessment/Intervention    Current Cognitive/Communication Assessment functional  -MB        Follows Commands/Answers Questions 100% of the time  -MB        Oral Motor Structure and Function    Oral Motor Anatomy and Physiology  patient demonstrates anatomy and physiology that is WNL  -KW       Dentition Assessment present and adequate  -MB present and adequate   -KW       Secretion Management WNL/WFL  -MB WNL/WFL  -KW       Mucosal Quality moist, healthy  -MB moist, healthy  -       Velar Elevation  WFL (within functional limits)  -       Volitional Swallow  no difficulties initiating volitional swallow  -       Volitional Cough  no difficulties initiating volitional cough  -       Oral Musculature General Assessment WFL (within functional limits)  -MB WFL (within functional limits)  -       General Feeding/Swallowing Observations    Current Feeding Method oral feeding methods  -MB NPO  -       Respiratory Support Currently in Use tracheostomy in place  -MB tracheostomy in place  -       Observations of Self Feeding Skills  appropriate self feeding skills observed  -       Clinical Swallow Exam    Mode of Presentation  fed by clinician;spoon;self fed;cup  -       Oral Phase Results  intact oral phase without signs of dysfunction  -       Pharyngeal Phase Results  susupected impaired pharyngeal phase of swallowing  -       Summary of Clinical Exam  Swallow evaluation completed.  Cuff deflated, no cough wtih cuff deflation.  Able to acheive some voicing.  No overt s/s of aspiration with full range of consistencies, however with hx and trach tube will be more cautious until PMV placed and able to gain more accurate view with voicing. Recommend: 1) Mech soft diet with nectar thick liquids 2) Water between meals 3) PMV trials with SLP hopefully tomorrow.  -       Videofluoroscopic Swallowing Exam    Risks/Benefits Reviewed risks/benefits explained;agreed to eval;patient  -MB        Tracheostomy Present tracheostomy present  -MB        Radiologic Views Used for Examination left lateral view  -MB        VFSS    Mode of Presentation thin:;nectar:;straw;honey:;pudding:;cohesive solid:;spoon  -MB        Pharyngeal Phase Physiologic Impairment pudding:;bolus to valleculae before initiation of response;thin:;nectar:;tongue base retraction reduced;incomplete  epiglottic inversion  -MB        Post Swallow Residue thin:;residue present pyriform sinuses;residue present in valleculae  -MB        Pharyngeal Phase Results functional swallow  -MB        Summary of VFSS Full range of consistencies presented. Pt had decreased base of tongue strength and decreased epiglottic inversion with nectar and thin. He had mild-moderate residue in the vallecula and pyriforms following the first thin liquid trial. He had premature loss to the vallecula secondary to a delay with pudding thick barium. He had sluggish epiglottic inversion with the second trial of thin barium. He also had decreased rotary chew of the regular solid. No definite penetration or aspiration was observed throughout the study.  -MB        VFSS Swallow Recommendations    Eating Assistance no assistance needed with self eating  -MB        Oral Care oral care with toothbrush and dentifrice BID and PRN  -MB        Modified Eating Strategies upright positioning 90 degrees  -MB        Recommended Diet regular textures;thin liquids  -MB        Swallow Recommendations    Other Recommendations  mechanical soft;nectar thick;water between meals  -         User Key  (r) = Recorded By, (t) = Taken By, (c) = Cosigned By    Initials Name Effective Dates    MB Daniela Heck, [facility]-SLP 08/02/16 -     KW Daniela Bentley, MS [facility]-SLP 08/02/16 -         EDUCATION  The patient has been educated in the following areas:   Dysphagia (Swallowing Impairment).    SLP Recommendation and Plan  SLP Swallowing Diagnosis: other (see comments) (WFL)  SLP Diet Recommendation: regular textures, thin liquids  Recommended Feeding/Eating Techniques: maintain upright posture during/after eating for 30 mins  SLP Rec. for Method of Medication Administration: meds whole with thin liquid  Monitor For Signs Of Aspiration: cough, gurgly voice, throat clearing, pneumonia     Criteria for Skilled Therapeutic Interventions Met: demonstrates skilled criteria for  intervention, other (see comments) (for diet tolerance)  Anticipated Discharge Disposition: home  Rehab Potential/Prognosis, Swallowing: good, to achieve stated therapy goals  Therapy Frequency: PRN             Plan of Care Review  Plan Of Care Reviewed With: patient, caregiver  Progress: improving  Outcome Summary/Follow up Plan: VFSS completed. Full range of consistencies presented. Pt had decreased base of tongue strength and decreased epiglottic inversion with nectar and thin. He had mild-moderate residue in the vallecula and pyriforms following the first thin liquid trial. He had premature loss to the vallecula secondary to a delay with pudding thick barium. He had sluggish epiglottic inversion with the second trial of thin barium. He also had decreased rotary chew of the regular solid. No definite penetration or aspiration was observed throughout the study. Recommend ugprade to regular solids and thin liquids. SLP will follow-up for diet tolerance and to work with PMV.          IP SLP Goals       07/17/17 1411 07/16/17 1141 07/16/17 1139    Safely Consume Diet    Safely Consume Diet- SLP, Date Goal Reviewed 07/17/17  -MB      Safely Consume Diet- SLP, Outcome  goal ongoing  -KW     SLP- Additional Goal 1    Additional Goal 1- SLP, Date Established   07/16/17  -KW    Additional Goal 1- SLP, Time to Achieve   by discharge  -KW    Additional Goal 1- SLP, Activity Level   Patient will tolerate PMV without any s/s of dsitress and produce speech over background noise at 3 feet away.  -KW    Additional Goal 1- SLP, Additional Goal   Patient will independently place and remove PMV.  -KW    Additional Goal 1- SLP, Date Goal Reviewed   07/16/17  -KW      07/16/17 1131 07/15/17 1157 07/05/17 0926    Safely Consume Diet    Safely Consume Diet- SLP, Date Established  07/15/17  -KW     Safely Consume Diet- SLP, Time to Achieve  by discharge  -KW     Safely Consume Diet- SLP, Additional Goal  Patient will tolerate upgraded  PO trials with SLP w/ no s/s of aspiration.  -KW     Safely Consume Diet- SLP, Date Goal Reviewed 07/16/17  -KW 07/15/17  -KW 07/05/17  -KW    Safely Consume Diet- SLP, Outcome   goal no longer appropriate  -KW    Safely Consume Diet- SLP, Reason Goal Not Met   medical status inhibits participation  -KW      User Key  (r) = Recorded By, (t) = Taken By, (c) = Cosigned By    Initials Name Provider Type    KASSIE Heck CCC-SLP Speech and Language Pathologist    KW Daniela Bentley, MS CCC-SLP Speech and Language Pathologist             SLP Outcome Measures (last 72 hours)      SLP Outcome Measures       07/17/17 1400 07/16/17 1100 07/15/17 1100    SLP Outcome Measures    Outcome Measure Used? Adult NOMS  -MB Adult NOMS  -KW Adult NOMS  -KW    FCM Scores    FCM Chosen Swallowing  -MB Voice Following Tracheostomy  -KW Swallowing  -KW    Swallowing FCM Score 6  -MB  4  -KW    Voice Following Tracheostomy FCM Score  6  -KW       User Key  (r) = Recorded By, (t) = Taken By, (c) = Cosigned By    Initials Name Effective Dates    KASSIE Heck CCC-SLP 08/02/16 -     ANASTACIO Bentley, MS CCC-SLP 08/02/16 -            Time Calculation:         Time Calculation- SLP       07/17/17 1415          Time Calculation- SLP    SLP Start Time 1321  -MB      SLP Stop Time 1415  -MB      SLP Time Calculation (min) 54 min  -MB      SLP Received On 07/17/17  -MB      SLP Goal Re-Cert Due Date 07/27/17  -MB        User Key  (r) = Recorded By, (t) = Taken By, (c) = Cosigned By    Initials Name Provider Type    KASSIE Heck CCC-SLP Speech and Language Pathologist          Therapy Charges for Today     Code Description Service Date Service Provider Modifiers Qty    55971040460 HC ST SWALLOWING CURRENT STATUS 7/17/2017 LEONIDES Michaud GN, CI 1    29909685677 HC ST SWALLOWING PROJECTED 7/17/2017 LEONIDES Michaud GN, CH 1    01676280464 HC ST MOTION FLUORO EVAL SWALLOW 4 7/17/2017 Daniela Heck  CCC-SLP GN 1          SLP G-Codes  SLP NOMS Used?: Yes  Functional Limitations: Swallowing  Swallow Current Status (): At least 1 percent but less than 20 percent impaired, limited or restricted  Swallow Goal Status (): 0 percent impaired, limited or restricted  Other Speech-Language Pathology Functional Limitation Current Status (): At least 1 percent but less than 20 percent impaired, limited or restricted  Other Speech-Language Pathology Functional Limitation Goal Status (): At least 1 percent but less than 20 percent impaired, limited or restricted  Other Speech-Language Pathology Functional Limitation Discharge Status (): At least 1 percent but less than 20 percent impaired, limited or restricted    Daniela Heck, CCC-SLP  7/17/2017

## 2017-07-17 NOTE — PLAN OF CARE
Problem: Patient Care Overview (Adult)  Goal: Plan of Care Review  Outcome: Ongoing (interventions implemented as appropriate)    07/17/17 3210   Coping/Psychosocial Response Interventions   Plan Of Care Reviewed With patient   Patient Care Overview   Progress progress toward functional goals as expected         Problem: Fall Risk (Adult)  Goal: Absence of Falls  Outcome: Ongoing (interventions implemented as appropriate)    Problem: Pressure Ulcer Risk (Roman Scale) (Adult,Obstetrics,Pediatric)  Goal: Skin Integrity  Outcome: Ongoing (interventions implemented as appropriate)

## 2017-07-17 NOTE — PROGRESS NOTES
ENT/FPRS ([facility]) Progress Note:       LOS: 13 days   Patient Care Team:  Linda Hoffman DNP, APRDICKSON as PCP - General  Linda Hoffman DNP, APRN as PCP - Family Medicine    Active consulting complaint: Angioedema, improving    Subjective     Interval History:     Status of active consulting problem: Postop day 5 tracheostomy.  He is doing very well, he denies shortness of breath. He states he is breathing well on room air via trach.  He was weaned from the vent 3 days ago.  His tongue swelling is down. He reports mild, thin secretions from trach which are not bothersome. Using PMV with supervision per ST. Scheduled for dysphagia study today.     History taken from: patient    Review of Systems:    Review of Systems   Constitutional: Negative for chills and fever.   HENT:        Tongue swelling decreased     Respiratory: Negative for shortness of breath and stridor.    Musculoskeletal: Negative for neck pain.       Objective     Vital Signs  Temp:  [97.8 °F (36.6 °C)-99.1 °F (37.3 °C)] 98.8 °F (37.1 °C)  Heart Rate:  [88-98] 97  Resp:  [18-20] 20  BP: (136-178)/(72-92) 178/72    Physical Exam:   Physical Exam   Constitutional: He is oriented to person, place, and time. He appears well-developed and well-nourished. He is cooperative. No distress.   HENT:   Head: Normocephalic and atraumatic.   Right Ear: External ear normal.   Left Ear: External ear normal.   Nose: Nose normal.   Mouth/Throat: Oropharynx is clear and moist.   Tongue swelling almost resolved. Tongue fits in the dental arch well. Tongue lacerations healing well.    Eyes: EOM are normal.   Neck: Neck supple.   Trach deflated, stable, and secure. Sutures removed. #6 deflated, cuffed Shiley removed. #6 uncuffed Shiley replaced. Stoma healthy without granulation. Voicing well with finger occlusion.     Pulmonary/Chest: Effort normal. No respiratory distress.   On room air   Neurological: He is alert and oriented to person, place, and time. No cranial  nerve deficit.   Psychiatric: He has a normal mood and affect. His behavior is normal.        Results Review:       Lab Results (last 24 hours)     Procedure Component Value Units Date/Time    CBC (No Diff) [[id number]]  (Abnormal) Collected:  07/17/17 0343    Specimen:  Blood Updated:  07/17/17 0442     WBC 23.30 (H) 10*3/mm3      RBC 3.05 (L) 10*6/mm3      Hemoglobin 9.2 (L) g/dL      Hematocrit 27.4 (L) %      MCV 89.8 fL      MCH 30.2 pg      MCHC 33.6 g/dL      RDW 14.7 %      RDW-SD 48.4 fl      MPV 10.2 fL      Platelets 493 (H) 10*3/mm3     Basic Metabolic Panel [[id number]]  (Abnormal) Collected:  07/17/17 0343    Specimen:  Blood Updated:  07/17/17 0513     Glucose 86 mg/dL      BUN 17 mg/dL       Specimen hemolyzed. Results may be affected.        Creatinine 0.95 mg/dL      Sodium 142 mmol/L      Potassium 3.9 mmol/L       Specimen hemolyzed.  Results may be affected.        Chloride 109 mmol/L      CO2 22.0 (L) mmol/L      Calcium 9.3 mg/dL      eGFR Non African Amer 84 mL/min/1.73      BUN/Creatinine Ratio 17.9     Anion Gap 11.0 mmol/L     Narrative:       GFR Normal >60  Chronic Kidney Disease <60  Kidney Failure <15        Imaging Results (last 24 hours)     ** No results found for the last 24 hours. **          Medication Review:     Current Facility-Administered Medications   Medication Dose Route Frequency Provider Last Rate Last Dose   • acetaminophen (TYLENOL) tablet 650 mg  650 mg Oral Q4H PRN Jairon Perez, DO   650 mg at 07/09/17 1003   • albuterol (PROVENTIL) nebulizer solution 0.083% 2.5 mg/3mL  2.5 mg Nebulization Q6H - RT Jairon Perez DO   2.5 mg at 07/17/17 1129   • amLODIPine (NORVASC) tablet 10 mg  10 mg Per G Tube Q24H Cristino Encinas DO   10 mg at 07/17/17 0912   • carvedilol (COREG) tablet 25 mg  25 mg Oral Q12H Jairon Perez DO   25 mg at 07/17/17 0912   • CloNIDine (CATAPRES-TTS) 0.3 MG/24HR patch 1 patch  1 patch Transdermal Weekly Cristino Encinas,    1 patch at  07/11/17 1423   • colchicine tablet 0.6 mg  0.6 mg Oral Q12H Jairon Perez, DO   0.6 mg at 07/17/17 1111   • dextromethorphan polistirex ER (DELSYM) 30 MG/5ML oral suspension 30 mg  30 mg Oral BID Robe Hiwot Dorsey, DO   30 mg at 07/17/17 0913   • diazePAM (VALIUM) tablet 2 mg  2 mg Nasogastric Q12H Jairon Perez, DO       • enoxaparin (LOVENOX) syringe 40 mg  40 mg Subcutaneous Daily Cristino Encinas, DO   40 mg at 07/17/17 0912   • famotidine (PEPCID) tablet 20 mg  20 mg Per G Tube Q12H Cristino Encinas, DO   20 mg at 07/17/17 0913   • flintstones complete (FLINTSTONES) chewable tablet 1 tablet  1 tablet Oral Daily Jairon Perez, DO   1 tablet at 07/16/17 1732   • hydrALAZINE (APRESOLINE) injection 10 mg  10 mg Intravenous Q6H PRN Jairon Perez, DO   10 mg at 07/15/17 1058   • HYDROcodone-acetaminophen (NORCO)  MG per tablet 1 tablet  1 tablet Oral Q4H PRN Cristino Encinas, DO   1 tablet at 07/17/17 0625   • HYDROcodone-acetaminophen (NORCO) 5-325 MG per tablet 1 tablet  1 tablet Oral Q4H PRN Cristino Encinas, DO   1 tablet at 07/17/17 0912   • ibuprofen (ADVIL,MOTRIN) 100 MG/5ML suspension 200 mg  200 mg Oral Q6H PRN Cristino Encinas, DO   200 mg at 07/09/17 1342   • indomethacin SR (INDOCIN SR) CR capsule 75 mg  75 mg Oral BID With Meals Jairon Perez, DO   75 mg at 07/17/17 1111   • levETIRAcetam (KEPPRA) tablet 1,000 mg  1,000 mg Oral Q12H Jairon Perez, DO   1,000 mg at 07/17/17 0912   • levoFLOXacin (LEVAQUIN) tablet 750 mg  750 mg Oral Q24H Jairon Perez, DO   750 mg at 07/17/17 0914   • lidocaine-Maalox-diphenhydramine (MIRACLE MOUTHWASH) oral suspension 30 mL  30 mL Oral 4x Daily PRN Jairon Perez DO       • LORazepam (ATIVAN) injection 1 mg  1 mg Intravenous Q8H PRN Erwin Glass MD   1 mg at 07/14/17 1307   • [START ON 7/18/2017] losartan (COZAAR) tablet 100 mg  100 mg Oral Q24H Jairon Perez DO       • metroNIDAZOLE (FLAGYL) tablet 500 mg  500 mg Per G Tube Q8H Cristino LAGUNAS  Huang, DO   500 mg at 07/17/17 0625   • naloxone (NARCAN) injection 0.4 mg  0.4 mg Intravenous Q5 Min PRN Jairon Perez DO       • ondansetron (ZOFRAN) injection 4 mg  4 mg Intravenous Q6H PRN Jairon Perez DO   4 mg at 07/14/17 1837   • potassium chloride (MICRO-K) CR capsule 40 mEq  40 mEq Oral BID With Meals Jairon Perez DO   40 mEq at 07/17/17 0913   • sodium chloride 0.9 % flush 1-10 mL  1-10 mL Intravenous PRN Jairon Perez DO           Assessment/Plan     Active Problems:    Angio-edema    Seizures    HCAP (healthcare-associated pneumonia)       Continue current tracheostomy care.  Trach changed to #6 uncuffed Shiley (nonfenestrated- there are no fenestrated available). Continue PMV as tolerated when awake and ST at bedside. Dysphagia study today. Consider capping trach if he continues to do well.       Beatriz Briggs, APRN  07/17/17  1:29 PM

## 2017-07-17 NOTE — THERAPY TREATMENT NOTE
Acute Care - Physical Therapy Treatment Note  [facility]     Patient Name: Nishant Savage  : 1965  MRN: [id number]  Today's Date: 2017  Onset of Illness/Injury or Date of Surgery Date: 17  Date of Referral to PT: 17  Referring Physician: Dr. Perez    Admit Date: 7/3/2017    Visit Dx:    ICD-10-CM ICD-9-CM   1. Oropharyngeal dysphagia R13.12 787.22   2. Seizures R56.9 780.39   3. Angioedema, sequela T78.3XXS 909.9   4. Alcohol withdrawal, with delirium F10.231 291.0   5. Impaired mobility Z74.09 799.89   6. Voice absence R49.1 784.41     Patient Active Problem List   Diagnosis   • Hyperlipidemia   • HTN (hypertension)   • Gout   • Anxiety   • Arthritis   • Erectile dysfunction   • Angio-edema   • Seizures   • HCAP (healthcare-associated pneumonia)               Adult Rehabilitation Note       17 0820 17 1055 07/15/17 1130    Rehab Assessment/Intervention    Discipline physical therapy assistant  -AE speech language pathologist  -KW     Document Type therapy note (daily note)  -AE      Subjective Information agree to therapy;complains of;pain  -AE no complaints  -KW     Patient Effort, Rehab Treatment  excellent  -KW     Precautions/Limitations fall precautions   c-diff, trach  -AE      Recorded by [AE] Flor Camp PTA [KW] Daniela Bentley MS CCC-SLP     Pain Assessment    Pain Assessment 0-10  -AE      Pain Score 8  -AE      Pain Location Foot  -AE      Pain Orientation Left;Right  -AE      Pain Descriptors Aching;Burning  -AE      Pain Frequency Intermittent  -AE      Recorded by [AE] Flor Camp PTA      Dysphagia/Swallow Intervention    Dysphagia/Swallow Therapeutic Feed  Took trials of thin liquids.  Difficulty to determine safety.  No overt coughing but some wet voice vs harsh vocal quality.  Will complete Dysphagia Study Monday to determine safet diet consistency.  -KW Patient will tolerate upgraded diet consistency with SLP w/ no overt s/s of aspiration.  -KW     Recorded by  [KW] Daniela Bentley MS CCC-SLP [KW] Daniela Bentley MS CCC-SLP    Bed Mobility, Assessment/Treatment    Bed Mobility, Comment up in chair   assisted with dressing  -AE      Recorded by [AE] Flor Camp PTA      Transfer Assessment/Treatment    Transfers, Sit-Stand [place] minimum assist (75% patient effort);verbal cues required  -AE      Transfers, Stand-Sit [place] contact guard assist;verbal cues required  -AE      Recorded by [AE] Flor Camp PTA      Gait Assessment/Treatment    Gait, [place] Level contact guard assist  -AE      Gait, Assistive Device rolling walker  -AE      Gait, Distance (Feet) 15   Pt in too much pain to walk  -AE      Gait, Gait Pattern Analysis swing-to gait  -AE      Gait, Gait Deviations bilateral:;mile decreased;antalgic  -AE      Gait, Safety Issues step length decreased  -AE      Gait, Impairments strength decreased  -AE      Recorded by [AE] Flor Camp PTA      Therapy Exercises    Bilateral Lower Extremities AROM:;10 reps;sitting  -AE      Recorded by [AE] Flor Camp PTA      Sensory Assessment/Intervention    Sensory Impairment hypersensitivity   B LE More on L  -AE      Recorded by [AE] Flor Camp PTA      Positioning and Restraints    Pre-Treatment Position bathroom  -AE      Post Treatment Position bed  -AE      In Bed sitting EOB;call light within reach  -AE      Recorded by [AE] Flor Camp PTA        07/15/17 0836          Rehab Assessment/Intervention    Discipline physical therapy assistant  -MF      Treatment Not Performed other (see comments)  -MF      Treatment Not Performed, Comment Pt. off vent and is currently up with nurisng on toilet. Notified nursing that pt. will need new orders and re-mayte to continue PT therapy.  -MF      Recorded by [MF] Uma Restrepo PTA        User Key  (r) = Recorded By, (t) = Taken By, (c) = Cosigned By    Initials Name Effective Dates    AE Flor Camp PTA 06/22/15 -      ANASTACIO Bentley, MS CCC-SLP 08/02/16 -     MF mUa Restrepo, PTA 08/02/16 -                 IP PT Goals       07/16/17 1441 07/10/17 0810       Bed Mobility PT LTG    Bed Mobility PT LTG, Date Established 07/16/17  -MARJORIE 07/10/17  -MARJORIE     Bed Mobility PT LTG, Time to Achieve by discharge  -MARJORIE by discharge  -MARJORIE     Bed Mobility PT LTG, Activity Type all bed mobility  -MARJORIE roll left/roll right  -MARJORIE     Bed Mobility PT LTG, [place] Level independent  -MARJORIE moderate assist (50% patient effort)  -MARJORIE     Bed Mobility PT Goal  LTG, Assist Device bed rails  -MARJORIE bed rails  -MARJORIE     Bed Mobility PT LTG, Date Goal Reviewed 07/16/17  -MARJORIE      Bed Mobility PT LTG, Outcome goal revised  -MARJORIE      Bed Mobility PT LTG, Reason Goal Not Met goals revised this date   d/c 7/10 goal, goal updated  -MARJORIE      Transfer Training PT LTG    Transfer Training PT LTG, Date Established 07/16/17  -MARJORIE      Transfer Training PT LTG, Time to Achieve by discharge  -MARJORIE      Transfer Training PT LTG, Activity Type bed to chair /chair to bed;sit to stand/stand to sit  -MARJORIE      Transfer Training PT LTG, [place] Level conditional independence;independent  -MARJORIE      Transfer Training PT LTG, Assist Device walker, rolling  -MARJORIE      Gait Training PT LTG    Gait Training Goal PT LTG, Date Established 07/16/17  -MARJORIE      Gait Training Goal PT LTG, Time to Achieve by discharge  -MARJORIE      Gait Training Goal PT LTG, [place] Level supervision required;conditional independence  -MARJORIE      Gait Training Goal PT LTG, Assist Device walker, rolling  -MARJORIE      Gait Training Goal PT LTG, Distance to Achieve 200  -MARJORIE      Stair Training PT LTG    Stair Training Goal PT LTG, Date Established 07/16/17  -MARJORIE      Stair Training Goal PT LTG, Time to Achieve by discharge  -MARJORIE      Stair Training Goal PT LTG, Number of Steps 3  -MARJORIE      Stair Training Goal PT LTG, [place] Level contact guard assist  -MARJORIE      Stair Training Goal PT LTG, Distance to Achieve Attempt  stairs if pt. to d/c home  -MARJORIE      Strength Goal PT LTG    Strength Goal PT LTG, Date Established  07/10/17  -MARJORIE     Strength Goal PT LTG, Time to Achieve  by discharge  -MARJORIE     Strength Goal PT LTG, Measure to Achieve  AAROM/AROM, B UE/LE, 10-20 reps, min assist  -MARJORIE     Strength Goal PT LTG, Date Goal Reviewed 07/16/17  -MARJORIE      Strength Goal PT LTG, Outcome goal partially met  -MARJORIE      Strength Goal PT LTG, Reason Goal Not Met progress slower than expected  -MARJORIE      Physical Therapy PT LTG    Physical Therapy PT LTG, Date Established  07/10/17  -MARJORIE     Physical Therapy PT LTG, Time to Achieve  by discharge  -MARJORIE     Physical Therapy PT LTG, Activity Type  No new evidence of skin breakdown or contractures while pt. on the vent.   -MARJORIE     Physical Therapy PT LTG, Date Goal Reviewed 07/16/17  -MARJORIE      Physical Therapy PT LTG, Outcome goal met  -MARJORIE      Physical Therapy PT LTG, Reason Goal Not Met goals revised this date   d/c goal  -MARJORIE        User Key  (r) = Recorded By, (t) = Taken By, (c) = Cosigned By    Initials Name Provider Type    MARJORIE Olsen, PT DPT Physical Therapist          Physical Therapy Education     Title: PT OT SLP Therapies (Active)     Topic: Physical Therapy (Active)     Point: Mobility training (Active)    Learning Progress Summary    Learner Readiness Method Response Comment Documented by Status   Patient Acceptance E NR t/f's benefits of activity AE 07/17/17 0901 Active    Acceptance E LAZARO,KURT,NR Educated pt/family on progression of PT POC and benefits of activity MARJORIE 07/16/17 1441 Done    Acceptance E,D DU Benefits of activity;ex's SUKHDEEP 07/14/17 0920 Done    Acceptance E,D NR Benefit of activities; ex's SUKHDEEP 07/13/17 0847 Active    Nonacceptance E NL Pt. sedated on vent, no evidence of learning. MF 07/11/17 0905 Active   Family Acceptance E LAZARO,KURT,NR Educated pt/family on progression of PT POC and benefits of activity MARJORIE 07/16/17 1441 Done               Point: Home exercise program (Active)     Learning Progress Summary    Learner Readiness Method Response Comment Documented by Status   Patient Nonacceptance E NL Pt. sedated on vent, no evidence of learning.  07/12/17 0837 Active    Nonacceptance E NL Pt. sedated on vent, no evidence of learning.  07/11/17 0905 Active    Acceptance E NR Educated pt on progression of PT POC and benefits of activity MARJORIE 07/10/17 0810 Active               Point: Body mechanics (Active)    Learning Progress Summary    Learner Readiness Method Response Comment Documented by Status   Patient Nonacceptance E NL Pt. sedated on vent, no evidence of learning.  07/11/17 0905 Active               Point: Precautions (Active)    Learning Progress Summary    Learner Readiness Method Response Comment Documented by Status   Patient Nonacceptance E NL Pt. sedated on vent, no evidence of learning.  07/11/17 0905 Active                      User Key     Initials Effective Dates Name Provider Type Discipline    AE 06/22/15 -  Flor Camp, PTA Physical Therapy Assistant PT     08/02/16 -  Jaime Olsen, PT DPT Physical Therapist PT     08/02/16 -  Britt Fuller, PTA Physical Therapy Assistant PT     08/02/16 -  Uma Restrepo, PTA Physical Therapy Assistant PT                    PT Recommendation and Plan  Anticipated Discharge Disposition: home with assist, home with home health, home with 24/7 care, transitional care, skilled nursing facility, inpatient rehabilitation facility (pending progress)  Planned Therapy Interventions: bed mobility training, transfer training, gait training, balance training, home exercise program, patient/family education, postural re-education, stair training, strengthening  PT Frequency: daily, 2 times/day  Plan of Care Review  Plan Of Care Reviewed With: patient  Progress: declining  Outcome Summary/Follow up Plan: Pt unable to walk farther than 15 ft today due to pain. Pt was cga with RW. Pt did performm sitting ex's           Outcome  Measures       07/17/17 0820 07/16/17 1441       How much help from another person do you currently need...    Turning from your back to your side while in flat bed without using bedrails? 3  -AE 3  -MARJORIE     Moving from lying on back to sitting on the side of a flat bed without bedrails? 3  -AE 3  -MARJORIE     Moving to and from a bed to a chair (including a wheelchair)? 3  -AE 3  -MARJORIE     Standing up from a chair using your arms (e.g., wheelchair, bedside chair)? 3  -AE 3  -MARJORIE     Climbing 3-5 steps with a railing? 2  -AE 2  -MARJORIE     To walk in hospital room? 3  -AE 3  -MARJORIE     AM-PAC 6 Clicks Score 17  -AE 17  -MARJORIE     Functional Assessment    Outcome Measure Options AM-PAC 6 Clicks Basic Mobility (PT)  -AE AM-PAC 6 Clicks Basic Mobility (PT)  -MARJORIE       User Key  (r) = Recorded By, (t) = Taken By, (c) = Cosigned By    Initials Name Provider Type    AE Flor Camp PTA Physical Therapy Assistant    MARJORIE Olsen, PT DPT Physical Therapist           Time Calculation:         PT Charges       07/17/17 0902          Time Calculation    Start Time 0820  -AE      Stop Time 0900  -AE      Time Calculation (min) 40 min  -AE      PT Received On 07/17/17  -AE      PT Goal Re-Cert Due Date 07/26/17  -AE      Time Calculation- PT    Total Timed Code Minutes- PT 40 minute(s)  -AE        User Key  (r) = Recorded By, (t) = Taken By, (c) = Cosigned By    Initials Name Provider Type    AE Flor Camp PTA Physical Therapy Assistant          Therapy Charges for Today     Code Description Service Date Service Provider Modifiers Qty    20656457488 HC GAIT TRAINING EA 15 MIN 7/17/2017 Flor Camp PTA GP 1    34920335995 HC PT THERAPEUTIC ACT EA 15 MIN 7/17/2017 Flor Camp PTA GP 1    42567660886 HC PT THER PROC EA 15 MIN 7/17/2017 Flor Camp PTA GP 1          PT G-Codes  Outcome Measure Options: AM-PAC 6 Clicks Basic Mobility (PT)  Score: 17  Functional Limitation: Mobility: Walking and moving around  Mobility: Walking  and Moving Around Current Status (): At least 40 percent but less than 60 percent impaired, limited or restricted  Mobility: Walking and Moving Around Goal Status (): At least 1 percent but less than 20 percent impaired, limited or restricted    Flor Camp, PTA  7/17/2017

## 2017-07-17 NOTE — PROGRESS NOTES
Continued Stay Note   [place]     Patient Name: Nishant Savage  MRN: [id number]  Today's Date: 7/17/2017    Admit Date: 7/3/2017          Discharge Plan       07/17/17 1013    Case Management/Social Work Plan    Plan Home with mother and home health    Patient/Family In Agreement With Plan yes    Additional Comments Pt is on 4C now and has made vast improvement. Spoke with Dr. Perez and pt now wants to go home with his mother and home health. LTAC is not necessary. Informed Janay 307Lashawn at [facility] and she said they just got a denial from the insurance anyway. Noted that pt has a trach so will need specific orders to arrange those supplies if pt goes home with the trach. Will check back tomorrow.               Discharge Codes     None        Expected Discharge Date and Time     Expected Discharge Date Expected Discharge Time    Jul 7, 2017             FLAKITA Petty

## 2017-07-17 NOTE — PLAN OF CARE
Problem: Patient Care Overview (Adult)  Goal: Plan of Care Review  Outcome: Ongoing (interventions implemented as appropriate)    07/17/17 0513   Coping/Psychosocial Response Interventions   Plan Of Care Reviewed With patient;family   Patient Care Overview   Progress improving   Outcome Evaluation   Outcome Summary/Follow up Plan Pt has rested well this shift. C/o of pain x1, PRN given. Pt remains on room air, O2 sats in mid 90's. Pt is a/o x4. Family is at bedside. VSS. WIll continue to monitor.        Goal: Adult Individualization and Mutuality  Outcome: Ongoing (interventions implemented as appropriate)  Goal: Discharge Needs Assessment  Outcome: Ongoing (interventions implemented as appropriate)    Problem: Fall Risk (Adult)  Goal: Absence of Falls  Outcome: Ongoing (interventions implemented as appropriate)    Problem: Pressure Ulcer Risk (Roman Scale) (Adult,Obstetrics,Pediatric)  Goal: Skin Integrity  Outcome: Ongoing (interventions implemented as appropriate)

## 2017-07-17 NOTE — PLAN OF CARE
Problem: Patient Care Overview (Adult)  Goal: Plan of Care Review  Outcome: Ongoing (interventions implemented as appropriate)    07/17/17 1411   Coping/Psychosocial Response Interventions   Plan Of Care Reviewed With patient;caregiver   Patient Care Overview   Progress improving   Outcome Evaluation   Outcome Summary/Follow up Plan VFSS completed. Full range of consistencies presented. Pt had decreased base of tongue strength and decreased epiglottic inversion with nectar and thin. He had mild-moderate residue in the vallecula and pyriforms following the first thin liquid trial. He had premature loss to the vallecula secondary to a delay with pudding thick barium. He had sluggish epiglottic inversion with the second trial of thin barium. He also had decreased rotary chew of the regular solid. No definite penetration or aspiration was observed throughout the study. Recommend ugprade to regular solids and thin liquids. SLP will follow-up for diet tolerance and to work with PMV.         Problem: Inpatient SLP  Goal: Dysphagia- Patient will safely consume diet as per recommendation with no signs/symptoms of aspiration  Outcome: Ongoing (interventions implemented as appropriate)    07/05/17 0926 07/15/17 1157 07/16/17 1141   Safely Consume Diet   Safely Consume Diet- SLP, Date Established --  07/15/17 --    Safely Consume Diet- SLP, Time to Achieve --  by discharge --    Safely Consume Diet- SLP, Additional Goal --  Patient will tolerate upgraded PO trials with SLP w/ no s/s of aspiration. --    Safely Consume Diet- SLP, Date Goal Reviewed --  --  --    Safely Consume Diet- SLP, Outcome --  --  goal ongoing   Safely Consume Diet- SLP, Reason Goal Not Met medical status inhibits participation --  --      07/17/17 1411   Safely Consume Diet   Safely Consume Diet- SLP, Date Established --    Safely Consume Diet- SLP, Time to Achieve --    Safely Consume Diet- SLP, Additional Goal --    Safely Consume Diet- SLP, Date Goal  Reviewed 07/17/17   Safely Consume Diet- SLP, Outcome --    Safely Consume Diet- SLP, Reason Goal Not Met --

## 2017-07-17 NOTE — PROCEDURES
Pre-procedure Diagnoses:               Procedures:     TRACHEOSTOMY REPLACEMENT [AUM577 (Custom)]               Procedure Note:      Procedure:   Tracheostomy Change     Preoperative Diagnosis:   Angioedema requiring tracheostomy     Postoperative Diagnosis:   Angioedema requiring trachestomy     Description of Procedure:  Patient was placed in upright position in bed. Existing Shiley trach was removed without incident. Stoma patent and intact without granulation. #6 Shiley nonfenestrated uncuffed trach was placed without difficulty. Patient tolerated procedure well.      Plan:   Continue current trach care. Change inner cannula daily

## 2017-07-17 NOTE — PROGRESS NOTES
[facility] Medicine Services  INPATIENT PROGRESS NOTE    Length of Stay: 13  Date of Admission: 7/3/2017  Primary Care Physician: Linda Hoffman, DNP, APRN    Subjective   Chief Complaint:   HPI   Complains of pain in his left great toe.  He does have a history of gout.     Nursing tells me that he had a formed bowel movement yesterday and is no longer having loose stool.     He denies any difficulty with breathing.  He is having no problems with secretions from the tracheostomy.  He states that he is eating fairly well, but his sister says that he has not.    Review of Systems   All pertinent negatives and positives are as above. All other systems have been reviewed and are negative unless otherwise stated.     Objective    Temp:  [97.3 °F (36.3 °C)-99.1 °F (37.3 °C)] 98.8 °F (37.1 °C)  Heart Rate:  [88-98] 98  Resp:  [18-20] 20  BP: (136-178)/(72-94) 178/72  Physical Exam  Constitutional: No distress.   Discussed with his nurse, Lynn. His sister is present. He is calm, polite, conversant with plugging his tracheostomy. PICC.    Head: Normocephalic and atraumatic.   Eyes: Pupils are equal, round, and reactive to light.   Neck: Neck supple. No JVD present. Trach clean.   Cardiovascular: Normal rate and regular rhythm.   Pulmonary/Chest: Effort normal and breath sounds normal. On room air via trach.    Abdominal: Soft. Bowel sounds are normal.   Musculoskeletal: He exhibits edema (trace).  New calor and erythema of the left great toe at the MTP joint.    Neurological: He is appropriate.   Skin: Skin is warm and dry.     Results Review:  I have reviewed the labs, radiology results, and diagnostic studies.    Laboratory Data:     Results from last 7 days  Lab Units 07/17/17  0343 07/16/17  0230 07/15/17  0143   WBC 10*3/mm3 23.30* 19.16* 16.11*   HEMOGLOBIN g/dL 9.2* 11.3* 11.2*   HEMATOCRIT % 27.4* 34.0* 33.8*   PLATELETS 10*3/mm3 493* 357 366       Results from last 7 days  Lab  Units 07/17/17  0343 07/16/17  0230 07/15/17  0143   SODIUM mmol/L 142 143 146*   POTASSIUM mmol/L 3.9 3.6 3.4*   CHLORIDE mmol/L 109 110 110   CO2 mmol/L 22.0* 23.0* 25.0   BUN mg/dL 17 18 18   CREATININE mg/dL 0.95 0.95 0.90   CALCIUM mg/dL 9.3 8.8 8.8   GLUCOSE mg/dL 86 131* 93     Radiology Data:   Imaging Results (last 24 hours)     Procedure Component Value Units Date/Time    US Venous Doppler Lower Extremity Bilateral (duplex) [[id number]] Collected:  07/16/17 1022     Updated:  07/16/17 1025    Narrative:       DUPLEX ULTRASOUND OF THE LOWER EXTREMITIES 7/16/2017 8:44 CST     HISTORY: BLE edema & pain; R13.12-Dysphagia, oropharyngeal phase;  R56.9-Unspecified convulsions; T78.3XXS-Angioneurotic edema, sequela;  F10.231-Alcohol dependence with withdrawal delirium; Z74.09-Other  reduced mobility     COMPARISON: NONE     FINDINGS:  Transverse and longitudinal grayscale and Doppler sonographic images of  bilateral lower extremities were obtained.     There is normal flow and compressibility of bilateral common femoral,  superficial femoral, popliteal, peroneal, anterior tibial, and posterior  tibial veins.       Impression:       1. Normal duplex ultrasound of bilateral lower extremities without  evidence of DVT.     This report was finalized on 07/16/2017 10:22 by Dr. Sri Rivera MD.        I have reviewed the patient current medications.     Assessment/Plan   Assessment:   1. Possible angioedema related to lisinopril prior to presentation and tongue bite.  2. Malignant hypertension at presentation and still difficult to control.   3. Possible new onset seizure disorder versus convulsive syncope.  4. Posterior reversible encephalopathy syndrome on MRI.  5. Tongue bite with sublingual hematoma, much improved.  6. Tobacco abuse.  7. Obstructive sleep apnea, noncompliant with device.  8. Gouty arthritis.  9. Daily alcohol use with recent Delirium tremens requiring sedation and mechanical ventilation.  10.  Hypokalemia, replaced.   11. Clostridium difficile colitis.  12. Possible lingual or sublingual infection, MSSA on culture.  13. Bilateral lower lobe infiltrates consistent with ventilator associated pneumonia combined with atelectasis.  14. Acute gouty arthritis of the left great toe.      Plan:  His blood pressure seems much improved over last 24 hours. Still a few hypertensive numbers, but overall doing much better. Changed metoprolol to carvedilol on 7/14. Placed on losartan on 7/14 and will increase today. There was some question as to whether or not he had tongue swelling on lisinopril. However, angiotensin receptor blockers should be okay as he needs these for blood pressure control. Continue amlodipine and clonidine patch.       He will remain on Keppra for at least 6-8 weeks per the neurology service.       He has been on broad-spectrum antibiotics for potential pneumonia since the 11th. The Staph in his sputum is MSSA. Discontinued vancomycin on 7/14. Discontinued Zosyn 7/16. Levaquin continues for now. He is also completing Flagyl Clostridium difficile toxin present in his stool. Today is day 11. No recent loose stools.  His white blood cell count continues to increase, however, his infections appear to be well-controlled. He has had infiltrates that appear to be improved on recent chest x-ray.  However, left lower lobe opacities have persisted.  He last received steroids on the 12th.    Place on indomethacin and colchicine.      Pulmonology seperated from ventilatory support during the afternoon of 7/14.  He is now on room air and doing very well with his tracheostomy. ENT hopefully can consider decannulation in the near future.      Potassium remains normal. Recent magnesium level is 2.1.    Wean Valium dosing.      Speech therapy cleared for soft foods with nectar thick liquids.  Continue physical and occupational therapy.      Discharge Planning: Given his vast improvement, I do not believe that he  will require LTAC placement. His mother would like to take him home to her house with home health at discharge. I think that that would be completely reasonable.    Jairon Perez,    07/17/17   9:25 AM

## 2017-07-17 NOTE — THERAPY TREATMENT NOTE
Acute Care - Speech Language Pathology Treatment Note  [facility]     Patient Name: Nishant Savage  : 1965  MRN: [id number]  Today's Date: 2017  Referring Physician: Dr. Zamarripa      Admit Date: 7/3/2017  Pt wore PMV for 30 minutes with no s/s of distress. His oxygen stated at 96% when checked periodically. Pt was able to place and remove valve independently. No backpressure observed with valve removal. Pt may wear valve when someone in room.  LEONIDES Dvais 2017 3:08 PM    Visit Dx:      ICD-10-CM ICD-9-CM   1. Oropharyngeal dysphagia R13.12 787.22   2. Seizures R56.9 780.39   3. Angioedema, sequela T78.3XXS 909.9   4. Alcohol withdrawal, with delirium F10.231 291.0   5. Impaired mobility Z74.09 799.89   6. Voice absence R49.1 784.41     Patient Active Problem List   Diagnosis   • Hyperlipidemia   • HTN (hypertension)   • Gout   • Anxiety   • Arthritis   • Erectile dysfunction   • Angio-edema   • Seizures   • HCAP (healthcare-associated pneumonia)              Adult Rehabilitation Note       17 1435 17 1321 17 0820    Rehab Assessment/Intervention    Discipline speech language pathologist  -MB  physical therapy assistant  -AE    Document Type therapy note (daily note)  -MB  therapy note (daily note)  -AE    Subjective Information agree to therapy;pain  -MB  agree to therapy;complains of;pain  -AE    Patient Effort, Rehab Treatment good  -MB      Precautions/Limitations   fall precautions   c-diff, trach  -AE    Recorded by [MB] KRISTINE MichaudSLP  [AE] Flor Camp PTA    Pain Assessment    Pain Assessment 0-10  -MB  0-10  -AE    Pain Score 10  -MB  8  -AE    Pain Location Foot  -MB  Foot  -AE    Pain Orientation Left  -MB  Left;Right  -AE    Pain Descriptors   Aching;Burning  -AE    Pain Frequency   Intermittent  -AE    Recorded by [MB] LEONIDES Michaud  [AE] Flor Camp PTA    Additional 1 Communication Treatment    Additional 1 Progress  100%;without cues  -MB      Recorded by [MB] LEONIDES Michaud      Additional 2 Communication Treatment    Additional 2 Progress 100%;without cues  -MB      Recorded by [MB] LEONIDES Michaud      Additional 3 Communication Treatment    Additional 3 Progress 80%;without cues  -MB      Recorded by [MB] LEONIDES Michaud      Dysphagia/Swallow Intervention    Dysphagia/Swallow Therapeutic Feed  Pt will tolerate trials of current diet with no s/s of aspiration.  -MB     Recorded by  [MB] LEONIDES Michaud     Bed Mobility, Assessment/Treatment    Bed Mobility, Comment   up in chair   assisted with dressing  -AE    Recorded by   [AE] Flor Camp PTA    Transfer Assessment/Treatment    Transfers, Sit-Stand [place]   minimum assist (75% patient effort);verbal cues required  -AE    Transfers, Stand-Sit [place]   contact guard assist;verbal cues required  -AE    Recorded by   [AE] Flor Camp PTA    Gait Assessment/Treatment    Gait, [place] Level   contact guard assist  -AE    Gait, Assistive Device   rolling walker  -AE    Gait, Distance (Feet)   15   Pt in too much pain to walk  -AE    Gait, Gait Pattern Analysis   swing-to gait  -AE    Gait, Gait Deviations   bilateral:;mile decreased;antalgic  -AE    Gait, Safety Issues   step length decreased  -AE    Gait, Impairments   strength decreased  -AE    Recorded by   [AE] Flor Camp PTA    Therapy Exercises    Bilateral Lower Extremities   AROM:;10 reps;sitting  -AE    Recorded by   [AE] Flor Camp PTA    Sensory Assessment/Intervention    Sensory Impairment   hypersensitivity   B LE More on L  -AE    Recorded by   [AE] Flor Camp PTA    Positioning and Restraints    Pre-Treatment Position   bathroom  -AE    Post Treatment Position   bed  -AE    In Bed   sitting EOB;call light within reach  -AE    Recorded by   [AE] Flor Camp PTA      07/16/17 1055 07/15/17 1130 07/15/17 0836    Rehab  Assessment/Intervention    Discipline speech language pathologist  -KW  physical therapy assistant  -MF    Subjective Information no complaints  -KW      Patient Effort, Rehab Treatment excellent  -KW      Treatment Not Performed   other (see comments)  -MF    Treatment Not Performed, Comment   Pt. off vent and is currently up with nurisng on toilet. Notified nursing that pt. will need new orders and re-mayte to continue PT therapy.  -MF    Recorded by [KW] Daniela Bentley, MS CCC-SLP  [MF] Uma Restrepo, [facility]    Dysphagia/Swallow Intervention    Dysphagia/Swallow Therapeutic Feed Took trials of thin liquids.  Difficulty to determine safety.  No overt coughing but some wet voice vs harsh vocal quality.  Will complete Dysphagia Study Monday to determine safet diet consistency.  -KW Patient will tolerate upgraded diet consistency with SLP w/ no overt s/s of aspiration.  -KW     Recorded by [KW] Daniela Bentley, MS CCC-SLP [KW] Daniela Bentley, MS CCC-SLP       User Key  (r) = Recorded By, (t) = Taken By, (c) = Cosigned By    Initials Name Effective Dates    MB Daniela Heck, [facility]-SLP 08/02/16 -     AE Flor Camp, PTA 06/22/15 -     KW Daniela Bentley, MS CCC-SLP 08/02/16 -      Uma Restrepo, [facility] 08/02/16 -               IP SLP Goals       07/17/17 1506 07/17/17 1411 07/16/17 1141    Safely Consume Diet    Safely Consume Diet- SLP, Date Goal Reviewed  07/17/17  -MB     Safely Consume Diet- SLP, Outcome   goal ongoing  -    SLP- Additional Goal 1    Additional Goal 1- SLP, Date Goal Reviewed 07/17/17  -MB      Additional Goal 1- SLP, Outcome goal ongoing  -MB        07/16/17 1139 07/16/17 1131 07/15/17 1157    Safely Consume Diet    Safely Consume Diet- SLP, Date Established   07/15/17  -KW    Safely Consume Diet- SLP, Time to Achieve   by discharge  -KW    Safely Consume Diet- SLP, Additional Goal   Patient will tolerate upgraded PO trials with SLP w/ no s/s of aspiration.  -KW    Safely Consume Diet-  SLP, Date Goal Reviewed  07/16/17  -KW 07/15/17  -KW    SLP- Additional Goal 1    Additional Goal 1- SLP, Date Established 07/16/17  -KW      Additional Goal 1- SLP, Time to Achieve by discharge  -KW      Additional Goal 1- SLP, Activity Level Patient will tolerate PMV without any s/s of dsitress and produce speech over background noise at 3 feet away.  -KW      Additional Goal 1- SLP, Additional Goal Patient will independently place and remove PMV.  -KW      Additional Goal 1- SLP, Date Goal Reviewed 07/16/17  -KW        07/05/17 0926          Safely Consume Diet    Safely Consume Diet- SLP, Date Goal Reviewed 07/05/17  -KW      Safely Consume Diet- SLP, Outcome goal no longer appropriate  -KW      Safely Consume Diet- SLP, Reason Goal Not Met medical status inhibits participation  -KW        User Key  (r) = Recorded By, (t) = Taken By, (c) = Cosigned By    Initials Name Provider Type    KASSIE Heck, CCC-SLP Speech and Language Pathologist    ANASTACIO Bentley, MS CCC-SLP Speech and Language Pathologist          EDUCATION  The patient has been educated in the following areas:   Communication Impairment.    SLP Recommendation and Plan              Anticipated Discharge Disposition: home                Plan of Care Review  Plan Of Care Reviewed With: patient, mother  Progress: progress toward functional goals as expected  Outcome Summary/Follow up Plan: Pt wore PMV for 30 minutes with no s/s of distress. His oxygen stated at 96% when checked periodically. Pt was able to place and remove valve independently. No backpressure observed with valve removal. Pt may wear valve when someone in room.         SLP Outcome Measures (last 72 hours)      SLP Outcome Measures       07/17/17 1400 07/16/17 1100 07/15/17 1100    SLP Outcome Measures    Outcome Measure Used? Adult NOMS  -MB Adult NOMS  -KW Adult NOMS  -KW    FCM Scores    FCM Chosen Swallowing  -MB Voice Following Tracheostomy  -KW Swallowing  -KW    Swallowing  FCM Score 6  -MB  4  -KW    Voice Following Tracheostomy FCM Score  6  -KW       User Key  (r) = Recorded By, (t) = Taken By, (c) = Cosigned By    Initials Name Effective Dates    LEONIDES Steen 08/02/16 -     KW Daniela Bentley, MS ERICKSON-SLP 08/02/16 -           Time Calculation:         Time Calculation- SLP       07/17/17 1507 07/17/17 1415       Time Calculation- SLP    SLP Start Time 1435  -MB 1321  -MB     SLP Stop Time 1500  -MB 1415  -MB     SLP Time Calculation (min) 25 min  -MB 54 min  -MB     SLP Received On 07/17/17  -MB 07/17/17  -MB     SLP Goal Re-Cert Due Date  07/27/17  -MB       User Key  (r) = Recorded By, (t) = Taken By, (c) = Cosigned By    Initials Name Provider Type    KRISTINE SteenSLP Speech and Language Pathologist          Therapy Charges for Today     Code Description Service Date Service Provider Modifiers Qty    14282835064 HC ST SWALLOWING CURRENT STATUS 7/17/2017 KRISTINE MichaudSLP GN, CI 1    79243942263 HC ST SWALLOWING PROJECTED 7/17/2017 LEONIDES Michaud GN, CH 1    15446695307 HC ST MOTION FLUORO EVAL SWALLOW 4 7/17/2017 KRISTINE MichaudSLP GN 1    55503827647 HC ST TREATMENT SPEECH 2 7/17/2017 LEONIDES Michaud GN 1          SLP G-Codes  SLP NOMS Used?: Yes  Functional Limitations: Swallowing  Swallow Current Status (): At least 1 percent but less than 20 percent impaired, limited or restricted  Swallow Goal Status (): 0 percent impaired, limited or restricted  Other Speech-Language Pathology Functional Limitation Current Status (): At least 1 percent but less than 20 percent impaired, limited or restricted  Other Speech-Language Pathology Functional Limitation Goal Status (): At least 1 percent but less than 20 percent impaired, limited or restricted  Other Speech-Language Pathology Functional Limitation Discharge Status (): At least 1 percent but less than 20 percent impaired, limited or  restricted    Daniela Heck, CCC-SLP  7/17/2017

## 2017-07-18 LAB
ANION GAP SERPL CALCULATED.3IONS-SCNC: 14 MMOL/L (ref 4–13)
BASOPHILS # BLD AUTO: 0.03 10*3/MM3 (ref 0–0.2)
BASOPHILS NFR BLD AUTO: 0.2 % (ref 0–2)
BUN BLD-MCNC: 22 MG/DL (ref 5–21)
BUN/CREAT SERPL: 18.8 (ref 7–25)
CALCIUM SPEC-SCNC: 9.7 MG/DL (ref 8.4–10.4)
CHLORIDE SERPL-SCNC: 108 MMOL/L (ref 98–110)
CO2 SERPL-SCNC: 22 MMOL/L (ref 24–31)
CREAT BLD-MCNC: 1.17 MG/DL (ref 0.5–1.4)
DEPRECATED RDW RBC AUTO: 47.6 FL (ref 40–54)
EOSINOPHIL # BLD AUTO: 0.32 10*3/MM3 (ref 0–0.7)
EOSINOPHIL NFR BLD AUTO: 2.3 % (ref 0–4)
ERYTHROCYTE [DISTWIDTH] IN BLOOD BY AUTOMATED COUNT: 14.4 % (ref 12–15)
GFR SERPL CREATININE-BSD FRML MDRD: 66 ML/MIN/1.73
GLUCOSE BLD-MCNC: 85 MG/DL (ref 70–100)
HCT VFR BLD AUTO: 36 % (ref 40–52)
HGB BLD-MCNC: 11.8 G/DL (ref 14–18)
IMM GRANULOCYTES # BLD: 0.1 10*3/MM3 (ref 0–0.03)
IMM GRANULOCYTES NFR BLD: 0.7 % (ref 0–5)
LYMPHOCYTES # BLD AUTO: 1.94 10*3/MM3 (ref 0.72–4.86)
LYMPHOCYTES NFR BLD AUTO: 13.7 % (ref 15–45)
MCH RBC QN AUTO: 29.5 PG (ref 28–32)
MCHC RBC AUTO-ENTMCNC: 32.8 G/DL (ref 33–36)
MCV RBC AUTO: 90 FL (ref 82–95)
MONOCYTES # BLD AUTO: 1.4 10*3/MM3 (ref 0.19–1.3)
MONOCYTES NFR BLD AUTO: 9.9 % (ref 4–12)
NEUTROPHILS # BLD AUTO: 10.41 10*3/MM3 (ref 1.87–8.4)
NEUTROPHILS NFR BLD AUTO: 73.2 % (ref 39–78)
PLATELET # BLD AUTO: 413 10*3/MM3 (ref 130–400)
PMV BLD AUTO: 10.4 FL (ref 6–12)
POTASSIUM BLD-SCNC: 3.9 MMOL/L (ref 3.5–5.3)
RBC # BLD AUTO: 4 10*6/MM3 (ref 4.8–5.9)
SODIUM BLD-SCNC: 144 MMOL/L (ref 135–145)
URATE SERPL-MCNC: 7.1 MG/DL (ref 3.5–8.5)
WBC NRBC COR # BLD: 14.2 10*3/MM3 (ref 4.8–10.8)

## 2017-07-18 PROCEDURE — 94762 N-INVAS EAR/PLS OXIMTRY CONT: CPT

## 2017-07-18 PROCEDURE — 97116 GAIT TRAINING THERAPY: CPT

## 2017-07-18 PROCEDURE — 80048 BASIC METABOLIC PNL TOTAL CA: CPT | Performed by: FAMILY MEDICINE

## 2017-07-18 PROCEDURE — 25010000002 ENOXAPARIN PER 10 MG: Performed by: FAMILY MEDICINE

## 2017-07-18 PROCEDURE — 94760 N-INVAS EAR/PLS OXIMETRY 1: CPT

## 2017-07-18 PROCEDURE — 97110 THERAPEUTIC EXERCISES: CPT

## 2017-07-18 PROCEDURE — 94799 UNLISTED PULMONARY SVC/PX: CPT

## 2017-07-18 PROCEDURE — 85025 COMPLETE CBC W/AUTO DIFF WBC: CPT | Performed by: INTERNAL MEDICINE

## 2017-07-18 PROCEDURE — 84550 ASSAY OF BLOOD/URIC ACID: CPT | Performed by: INTERNAL MEDICINE

## 2017-07-18 PROCEDURE — 92507 TX SP LANG VOICE COMM INDIV: CPT

## 2017-07-18 PROCEDURE — 99231 SBSQ HOSP IP/OBS SF/LOW 25: CPT | Performed by: NURSE PRACTITIONER

## 2017-07-18 PROCEDURE — 63710000001 FLINTSTONES COMPLETE 60 MG CHEWABLE TABLET: Performed by: INTERNAL MEDICINE

## 2017-07-18 RX ORDER — POTASSIUM CHLORIDE 750 MG/1
40 CAPSULE, EXTENDED RELEASE ORAL DAILY
Status: DISCONTINUED | OUTPATIENT
Start: 2017-07-19 | End: 2017-07-19 | Stop reason: HOSPADM

## 2017-07-18 RX ORDER — DIAZEPAM 2 MG/1
2 TABLET ORAL EVERY 12 HOURS SCHEDULED
Status: DISCONTINUED | OUTPATIENT
Start: 2017-07-18 | End: 2017-07-19 | Stop reason: HOSPADM

## 2017-07-18 RX ORDER — METRONIDAZOLE 500 MG/1
500 TABLET ORAL EVERY 8 HOURS SCHEDULED
Status: DISCONTINUED | OUTPATIENT
Start: 2017-07-18 | End: 2017-07-19 | Stop reason: HOSPADM

## 2017-07-18 RX ORDER — ALBUTEROL SULFATE 2.5 MG/3ML
2.5 SOLUTION RESPIRATORY (INHALATION) EVERY 6 HOURS PRN
Status: DISCONTINUED | OUTPATIENT
Start: 2017-07-18 | End: 2017-07-19 | Stop reason: HOSPADM

## 2017-07-18 RX ADMIN — HYDROCODONE BITARTRATE AND ACETAMINOPHEN 1 TABLET: 10; 325 TABLET ORAL at 17:11

## 2017-07-18 RX ADMIN — FAMOTIDINE 20 MG: 20 TABLET, FILM COATED ORAL at 21:12

## 2017-07-18 RX ADMIN — METRONIDAZOLE 500 MG: 500 TABLET, FILM COATED ORAL at 13:04

## 2017-07-18 RX ADMIN — DIAZEPAM 2 MG: 2 TABLET ORAL at 21:12

## 2017-07-18 RX ADMIN — COLCHICINE 0.6 MG: 0.6 TABLET, FILM COATED ORAL at 21:12

## 2017-07-18 RX ADMIN — FAMOTIDINE 20 MG: 20 TABLET, FILM COATED ORAL at 08:27

## 2017-07-18 RX ADMIN — METRONIDAZOLE 500 MG: 500 TABLET, FILM COATED ORAL at 21:12

## 2017-07-18 RX ADMIN — LEVETIRACETAM 1000 MG: 500 TABLET, FILM COATED ORAL at 08:27

## 2017-07-18 RX ADMIN — Medication 1 TABLET: at 12:53

## 2017-07-18 RX ADMIN — CARVEDILOL 25 MG: 25 TABLET, FILM COATED ORAL at 08:27

## 2017-07-18 RX ADMIN — AMLODIPINE BESYLATE 10 MG: 10 TABLET ORAL at 08:27

## 2017-07-18 RX ADMIN — CLONIDINE 1 PATCH: 0.3 PATCH TRANSDERMAL at 08:30

## 2017-07-18 RX ADMIN — ENOXAPARIN SODIUM 40 MG: 40 INJECTION SUBCUTANEOUS at 08:28

## 2017-07-18 RX ADMIN — CARVEDILOL 25 MG: 25 TABLET, FILM COATED ORAL at 21:12

## 2017-07-18 RX ADMIN — LEVOFLOXACIN 750 MG: 750 TABLET, FILM COATED ORAL at 08:27

## 2017-07-18 RX ADMIN — HYDROCODONE BITARTRATE AND ACETAMINOPHEN 1 TABLET: 10; 325 TABLET ORAL at 21:12

## 2017-07-18 RX ADMIN — INDOMETHACIN 75 MG: 75 CAPSULE, EXTENDED RELEASE ORAL at 08:26

## 2017-07-18 RX ADMIN — HYDROCODONE BITARTRATE AND ACETAMINOPHEN 1 TABLET: 10; 325 TABLET ORAL at 08:26

## 2017-07-18 RX ADMIN — ALBUTEROL SULFATE 2.5 MG: 2.5 SOLUTION RESPIRATORY (INHALATION) at 00:37

## 2017-07-18 RX ADMIN — INDOMETHACIN 75 MG: 75 CAPSULE, EXTENDED RELEASE ORAL at 17:11

## 2017-07-18 RX ADMIN — LOSARTAN POTASSIUM 100 MG: 50 TABLET ORAL at 08:28

## 2017-07-18 RX ADMIN — HYDROCODONE BITARTRATE AND ACETAMINOPHEN 1 TABLET: 5; 325 TABLET ORAL at 13:04

## 2017-07-18 RX ADMIN — ALBUTEROL SULFATE 2.5 MG: 2.5 SOLUTION RESPIRATORY (INHALATION) at 07:06

## 2017-07-18 RX ADMIN — DEXTROMETHORPHAN 30 MG: 30 SUSPENSION, EXTENDED RELEASE ORAL at 08:29

## 2017-07-18 RX ADMIN — DIAZEPAM 2 MG: 2 TABLET ORAL at 08:26

## 2017-07-18 RX ADMIN — METRONIDAZOLE 500 MG: 500 TABLET, FILM COATED ORAL at 05:13

## 2017-07-18 RX ADMIN — POTASSIUM CHLORIDE 40 MEQ: 750 CAPSULE, EXTENDED RELEASE ORAL at 08:28

## 2017-07-18 RX ADMIN — COLCHICINE 0.6 MG: 0.6 TABLET, FILM COATED ORAL at 08:27

## 2017-07-18 RX ADMIN — LEVETIRACETAM 1000 MG: 500 TABLET, FILM COATED ORAL at 21:12

## 2017-07-18 NOTE — THERAPY TREATMENT NOTE
Acute Care - Speech Language Pathology Treatment Note  [facility]     Patient Name: Nishant Savage  : 1965  MRN: [id number]  Today's Date: 2017  Referring Physician: Dr. Zamarripa      Admit Date: 7/3/2017  Pt appears to be tolerating valve with no s/s of distress. SLP educated on proper cleaning methods for valve and provided a shower collar for his trach. Pt completed trials of thin liquids with no overt s/s of aspiration.  LEONIDES Davis 2017 11:00 AM    Visit Dx:      ICD-10-CM ICD-9-CM   1. Oropharyngeal dysphagia R13.12 787.22   2. Seizures R56.9 780.39   3. Angioedema, sequela T78.3XXS 909.9   4. Alcohol withdrawal, with delirium F10.231 291.0   5. Impaired mobility Z74.09 799.89   6. Voice absence R49.1 784.41     Patient Active Problem List   Diagnosis   • Hyperlipidemia   • HTN (hypertension)   • Gout   • Anxiety   • Arthritis   • Erectile dysfunction   • Angio-edema   • Seizures   • HCAP (healthcare-associated pneumonia)              Adult Rehabilitation Note       17 1048 17 1034 17 1435    Rehab Assessment/Intervention    Discipline physical therapy assistant  -MF speech language pathologist  -MB speech language pathologist  -MB    Document Type  therapy note (daily note)  -MB therapy note (daily note)  -MB    Subjective Information  no complaints;agree to therapy  -MB agree to therapy;pain  -MB    Patient Effort, Rehab Treatment  good  -MB good  -MB    Recorded by [] Uma Restrepo, PTA [MB] LEONIDES Michaud [MB] LEONIDES Michaud    Pain Assessment    Pain Assessment   0-10  -MB    Pain Score   10  -MB    Pain Location   Foot  -MB    Pain Orientation   Left  -MB    Recorded by   [MB] LEONIDES Michaud    Additional 1 Communication Treatment    Additional 1 Progress  100%;discontinue achieved  -%;without cues  -MB    Recorded by  [MB] LEONIDES Michaud [MB] LEONIDES Michaud    Additional 2  Communication Treatment    Additional 2 Progress  100%;discontinue achieved  -%;without cues  -MB    Recorded by  [MB] LEONIDES Michaud [MB] LEONIDES Michaud    Additional 3 Communication Treatment    Additional 3 Progress   80%;without cues  -MB    Recorded by   [MB] LEONIDES Michaud    Dysphagia/Swallow Intervention    Dysphagia/Swallow Therapeutic Feed  Pt completed trials of thin liquids with no overt s/s of aspiration.   -MB     Recorded by  [MB] LEONIDES Michaud       07/17/17 1321 07/17/17 0820 07/16/17 1055    Rehab Assessment/Intervention    Discipline  physical therapy assistant  -AE speech language pathologist  -KW    Document Type  therapy note (daily note)  -AE     Subjective Information  agree to therapy;complains of;pain  -AE no complaints  -KW    Patient Effort, Rehab Treatment   excellent  -KW    Precautions/Limitations  fall precautions   c-diff, trach  -AE     Recorded by  [AE] Flor Camp PTA [KW] Daniela Bentley MS CCC-SLP    Pain Assessment    Pain Assessment  0-10  -AE     Pain Score  8  -AE     Pain Location  Foot  -AE     Pain Orientation  Left;Right  -AE     Pain Descriptors  Aching;Burning  -AE     Pain Frequency  Intermittent  -AE     Recorded by  [AE] Flor Camp PTA     Dysphagia/Swallow Intervention    Dysphagia/Swallow Therapeutic Feed Pt will tolerate trials of current diet with no s/s of aspiration.  -MB  Took trials of thin liquids.  Difficulty to determine safety.  No overt coughing but some wet voice vs harsh vocal quality.  Will complete Dysphagia Study Monday to determine safet diet consistency.  -KW    Recorded by [MB] LEONIDES Michaud  [KW] Daniela Bentley MS CCC-SLP    Bed Mobility, Assessment/Treatment    Bed Mobility, Comment  up in chair   assisted with dressing  -AE     Recorded by  [AE] Flor Camp PTA     Transfer Assessment/Treatment    Transfers, Sit-Stand [place]  minimum assist (75% patient  effort);verbal cues required  -AE     Transfers, Stand-Sit [place]  contact guard assist;verbal cues required  -AE     Recorded by  [AE] Flor Camp PTA     Gait Assessment/Treatment    Gait, [place] Level  contact guard assist  -AE     Gait, Assistive Device  rolling walker  -AE     Gait, Distance (Feet)  15   Pt in too much pain to walk  -AE     Gait, Gait Pattern Analysis  swing-to gait  -AE     Gait, Gait Deviations  bilateral:;mile decreased;antalgic  -AE     Gait, Safety Issues  step length decreased  -AE     Gait, Impairments  strength decreased  -AE     Recorded by  [AE] Flor Camp PTA     Therapy Exercises    Bilateral Lower Extremities  AROM:;10 reps;sitting  -AE     Recorded by  [AE] Flor Camp PTA     Sensory Assessment/Intervention    Sensory Impairment  hypersensitivity   B LE More on L  -AE     Recorded by  [AE] Flor Camp PTA     Positioning and Restraints    Pre-Treatment Position  bathroom  -AE     Post Treatment Position  bed  -AE     In Bed  sitting EOB;call light within reach  -AE     Recorded by  [AE] Flor Camp PTA       07/15/17 1130          Dysphagia/Swallow Intervention    Dysphagia/Swallow Therapeutic Feed Patient will tolerate upgraded diet consistency with SLP w/ no overt s/s of aspiration.  -KW      Recorded by [KW] Daniela Bentley MS CCC-SLP        User Key  (r) = Recorded By, (t) = Taken By, (c) = Cosigned By    Initials Name Effective Dates    MB Daniela Heck, [facility]-SLP 08/02/16 -     AE Flor Camp, [facility] 06/22/15 -     KW Daniela Bentley MS [facility]-SLP 08/02/16 -     MF Uma Restrepo, [facility] [place] 08/02/16 -               IP SLP Goals       07/18/17 1056 07/17/17 1506 07/17/17 1411    Safely Consume Diet    Safely Consume Diet- SLP, Date Goal Reviewed 07/18/17  -MB  07/17/17  -MB    SLP- Additional Goal 1    Additional Goal 1- SLP, Date Goal Reviewed 07/18/17  -MB 07/17/17  -MB     Additional Goal 1- SLP, Outcome  goal ongoing  -MB       07/16/17  1141 07/16/17 1139 07/16/17 1131    Safely Consume Diet    Safely Consume Diet- SLP, Date Goal Reviewed   07/16/17  -KW    Safely Consume Diet- SLP, Outcome goal ongoing  -KW      SLP- Additional Goal 1    Additional Goal 1- SLP, Date Established  07/16/17  -KW     Additional Goal 1- SLP, Time to Achieve  by discharge  -KW     Additional Goal 1- SLP, Activity Level  Patient will tolerate PMV without any s/s of dsitress and produce speech over background noise at 3 feet away.  -KW     Additional Goal 1- SLP, Additional Goal  Patient will independently place and remove PMV.  -KW     Additional Goal 1- SLP, Date Goal Reviewed  07/16/17  -KW       07/15/17 1157 07/05/17 0926       Safely Consume Diet    Safely Consume Diet- SLP, Date Established 07/15/17  -KW      Safely Consume Diet- SLP, Time to Achieve by discharge  -KW      Safely Consume Diet- SLP, Additional Goal Patient will tolerate upgraded PO trials with SLP w/ no s/s of aspiration.  -KW      Safely Consume Diet- SLP, Date Goal Reviewed 07/15/17  -KW 07/05/17  -KW     Safely Consume Diet- SLP, Outcome  goal no longer appropriate  -KW     Safely Consume Diet- SLP, Reason Goal Not Met  medical status inhibits participation  -KW       User Key  (r) = Recorded By, (t) = Taken By, (c) = Cosigned By    Initials Name Provider Type    KASSIE Heck, CCC-SLP Speech and Language Pathologist    ANASTACIO Bentley, MS CCC-SLP Speech and Language Pathologist          EDUCATION  The patient has been educated in the following areas:   Communication Impairment Dysphagia (Swallowing Impairment).    SLP Recommendation and Plan              Anticipated Discharge Disposition: home                Plan of Care Review  Plan Of Care Reviewed With: patient, family  Progress: progress toward functional goals as expected  Outcome Summary/Follow up Plan: Pt appears to be tolerating valve with no s/s of distress. SLP educated on proper cleaning methods for valve and provided a  shower collar for his trach. Pt completed trials of thin liquids with no overt s/s of aspiration.         SLP Outcome Measures (last 72 hours)      SLP Outcome Measures       07/17/17 1400 07/16/17 1100       SLP Outcome Measures    Outcome Measure Used? Adult NOMS  -MB Adult NOMS  -KW     FCM Scores    FCM Chosen Swallowing  -MB Voice Following Tracheostomy  -KW     Swallowing FCM Score 6  -MB      Voice Following Tracheostomy FCM Score  6  -KW       User Key  (r) = Recorded By, (t) = Taken By, (c) = Cosigned By    Initials Name Effective Dates    KASSIE Heck CCC-ALEXIA 08/02/16 -     KW Daniela Bentley, MS ERICKSON-SLP 08/02/16 -           Time Calculation:         Time Calculation- SLP       07/18/17 1058          Time Calculation- SLP    SLP Start Time 1034  -MB      SLP Stop Time 1048  -MB      SLP Time Calculation (min) 14 min  -MB      SLP Received On 07/18/17  -MB        User Key  (r) = Recorded By, (t) = Taken By, (c) = Cosigned By    Initials Name Provider Type    LEONIDES Steen Speech and Language Pathologist          Therapy Charges for Today     Code Description Service Date Service Provider Modifiers Qty    39832209645 HC ST SWALLOWING CURRENT STATUS 7/17/2017 LEONIDES Michaud GN, CI 1    50450236008 HC ST SWALLOWING PROJECTED 7/17/2017 LEONIDES Michaud GN, CH 1    38745751550 HC ST MOTION FLUORO EVAL SWALLOW 4 7/17/2017 LEONIDES Michaud GN 1    15446993877 HC ST TREATMENT SPEECH 2 7/17/2017 LEONIDES Michaud GN 1    39973407336 HC ST TREATMENT SPEECH 1 7/18/2017 LEONIDES Michaud GN 1          SLP G-Codes  SLP NOMS Used?: Yes  Functional Limitations: Swallowing  Swallow Current Status (): At least 1 percent but less than 20 percent impaired, limited or restricted  Swallow Goal Status (): 0 percent impaired, limited or restricted  Other Speech-Language Pathology Functional Limitation Current Status (): At least 1 percent but  less than 20 percent impaired, limited or restricted  Other Speech-Language Pathology Functional Limitation Goal Status (): At least 1 percent but less than 20 percent impaired, limited or restricted  Other Speech-Language Pathology Functional Limitation Discharge Status (): At least 1 percent but less than 20 percent impaired, limited or restricted    Daniela Heck CCC-SLP  7/18/2017

## 2017-07-18 NOTE — PROGRESS NOTES
[facility] Medicine Services  INPATIENT PROGRESS NOTE    Length of Stay: 14  Date of Admission: 7/3/2017  Primary Care Physician: Linda Hoffman, DNP, APRN    Subjective   Chief Complaint: gout  HPI   He has been up and had a shower this morning.  He has been walking the halls.  He no longer complains of any pain in his feet or legs.  The Indocin and colchicine have helped immensely.    He says that he plans to never smoke or drink alcohol again.    He wants to go home tomorrow if possible.    Review of Systems   All pertinent negatives and positives are as above. All other systems have been reviewed and are negative unless otherwise stated.     Objective    Temp:  [97.4 °F (36.3 °C)-98.5 °F (36.9 °C)] 97.4 °F (36.3 °C)  Heart Rate:  [73-97] 97  Resp:  [18-20] 18  BP: (110-129)/(69-81) 129/81  Physical Exam  Constitutional: No distress.   Discussed with his nurse, Lynn. His sister is present. He is calm, polite, conversant. PICC has been removed.    Head: Normocephalic and atraumatic.   Eyes: Pupils are equal, round, and reactive to light.   Neck: Neck supple. No JVD present. Trach clean and with PMV.   Cardiovascular: Normal rate and regular rhythm.   Pulmonary/Chest: Effort normal and breath sounds normal. On room air via trach.    Abdominal: Soft. Bowel sounds are normal.   Musculoskeletal: He exhibits edema (trace).  Improved calor and erythema of the left great toe at the MTP joint.   Neurological: He is appropriate. Good strength. Ambulatory.   Skin: Skin is warm and dry.     Results Review:  I have reviewed the labs, radiology results, and diagnostic studies.    Laboratory Data:     Results from last 7 days  Lab Units 07/18/17  0510 07/17/17  0343 07/16/17  0230   WBC 10*3/mm3 14.20* 23.30* 19.16*   HEMOGLOBIN g/dL 11.8* 9.2* 11.3*   HEMATOCRIT % 36.0* 27.4* 34.0*   PLATELETS 10*3/mm3 413* 493* 357       Results from last 7 days  Lab Units 07/18/17  0510 07/17/17  0343  07/16/17  0230   SODIUM mmol/L 144 142 143   POTASSIUM mmol/L 3.9 3.9 3.6   CHLORIDE mmol/L 108 109 110   CO2 mmol/L 22.0* 22.0* 23.0*   BUN mg/dL 22* 17 18   CREATININE mg/dL 1.17 0.95 0.95   CALCIUM mg/dL 9.7 9.3 8.8   GLUCOSE mg/dL 85 86 131*     Radiology Data:   Imaging Results (last 24 hours)     Procedure Component Value Units Date/Time    FL Video Swallow With Speech [[id number]] Collected:  07/17/17 1339     Updated:  07/17/17 1343    Narrative:       EXAMINATION: FL VIDEO SWALLOW W SPEECH-  7/17/2017 2:39 PM EDT     HISTORY: Dysphagia     COMPARISON:None     TECHNIQUE:     Image number: 1     Fluoroscopy time: 1.2 minutes     FINDINGS:     The oral and pharyngeal phase of swallowing was evaluated with multiple  barium consistencies.     Patient tolerated all consistencies well without laryngeal penetration  or aspiration.       Impression:       1. Negative dysphagia study.  This report was finalized on 07/17/2017 13:40 by Dr. Pierre Pan MD.        I have reviewed the patient current medications.     Assessment/Plan   Assessment:   1. Possible angioedema related to lisinopril prior to presentation and tongue bite.  2. Malignant hypertension at presentation and still difficult to control.   3. Possible new onset seizure disorder versus convulsive syncope.  4. Posterior reversible encephalopathy syndrome on MRI.  5. Tongue bite with sublingual hematoma, much improved.  6. Tobacco abuse.  7. Obstructive sleep apnea, noncompliant with device.  8. Daily alcohol use with recent Delirium tremens requiring sedation and mechanical ventilation.  9. Hypokalemia, replaced.   10. Clostridium difficile colitis.  11. Possible lingual or sublingual infection, MSSA on culture.  12. Bilateral lower lobe infiltrates consistent with ventilator associated pneumonia combined with atelectasis.  13. Acute gouty arthritis of the left great toe.      Plan:  His blood pressure is much improved over last 24-48 hours. Still a few  hypertensive numbers, but overall doing much better. Changed metoprolol to carvedilol on 7/14. Placed on losartan on 7/14 and will increase today. There was some question as to whether or not he had tongue swelling on lisinopril. However, angiotensin receptor blockers should be okay as he needs these for blood pressure control. Continue amlodipine and clonidine patch as well.       He will remain on Keppra for at least 6-8 weeks per the neurology service.       He had been on broad-spectrum antibiotics for potential pneumonia since the 11th. The Staph in his sputum is MSSA. Discontinued vancomycin on 7/14. Discontinued Zosyn 7/16. Levaquin to discontinue today, received 7 days. He is also completing Flagyl Clostridium difficile toxin present in his stool. Today is day 12, will treat for 14. No recent loose stools. His white blood cell count has decreased today. No signs of ongoing infections and no fevers.      Placed on indomethacin and colchicine on 7/17 for gout.       Pulmonology seperated from ventilatory support during the afternoon of 7/14. He is now on room air and doing very well with his tracheostomy. ENT hopefully can consider decannulation in the near future.      Potassium remains normal. Recent magnesium level is 2.1.     Weaned Valium dosing on 7/17.      Speech therapy cleared for regular foods with thin liquids after a dysphagia study on 7/17. Continue physical and occupational therapy.      Discharge Planning: Going home with his mother. Probably tomorrow.      Jairon Perez,    07/18/17   9:42 AM

## 2017-07-18 NOTE — PLAN OF CARE
Problem: Patient Care Overview (Adult)  Goal: Plan of Care Review  Outcome: Ongoing (interventions implemented as appropriate)    07/18/17 1515   Coping/Psychosocial Response Interventions   Plan Of Care Reviewed With patient   Patient Care Overview   Progress progress toward functional goals as expected         Problem: Fall Risk (Adult)  Goal: Absence of Falls  Outcome: Ongoing (interventions implemented as appropriate)    Problem: Pressure Ulcer Risk (Roman Scale) (Adult,Obstetrics,Pediatric)  Goal: Skin Integrity  Outcome: Outcome(s) achieved Date Met:  07/18/17

## 2017-07-18 NOTE — THERAPY TREATMENT NOTE
Acute Care - Physical Therapy Treatment Note  [facility]     Patient Name: Nishant Savage  : 1965  MRN: [id number]  Today's Date: 2017  Onset of Illness/Injury or Date of Surgery Date: 17  Date of Referral to PT: 17  Referring Physician: Dr. Perez    Admit Date: 7/3/2017    Visit Dx:    ICD-10-CM ICD-9-CM   1. Oropharyngeal dysphagia R13.12 787.22   2. Seizures R56.9 780.39   3. Angioedema, sequela T78.3XXS 909.9   4. Alcohol withdrawal, with delirium F10.231 291.0   5. Impaired mobility Z74.09 799.89   6. Voice absence R49.1 784.41     Patient Active Problem List   Diagnosis   • Hyperlipidemia   • HTN (hypertension)   • Gout   • Anxiety   • Arthritis   • Erectile dysfunction   • Angio-edema   • Seizures   • HCAP (healthcare-associated pneumonia)               Adult Rehabilitation Note       17 1048 17 1034 17 1435    Rehab Assessment/Intervention    Discipline physical therapy assistant  - speech language pathologist  -MB speech language pathologist  -MB    Document Type therapy note (daily note)  - therapy note (daily note)  -MB therapy note (daily note)  -MB    Subjective Information agree to therapy;no complaints  - no complaints;agree to therapy  -MB agree to therapy;pain  -MB    Patient Effort, Rehab Treatment  good  -MB good  -MB    Precautions/Limitations fall precautions   c-diff, impulsive, trach, but able to verbalize  -      Recorded by [] Uma Restrepo PTA [MB] LEONIDES Michaud [MB] LEONIDES Michaud    Pain Assessment    Pain Assessment No/denies pain  -  0-10  -MB    Pain Score   10  -MB    Pain Location   Foot  -MB    Pain Orientation   Left  -MB    Recorded by [] Uma Restrepo PTA  [MB] LEONIDES Michaud    Additional 1 Communication Treatment    Additional 1 Progress  100%;discontinue achieved  -%;without cues  -MB    Recorded by  [MB] LEONIDES Michaud [MB] LEONIDES Michaud     Additional 2 Communication Treatment    Additional 2 Progress  100%;discontinue achieved  -%;without cues  -MB    Recorded by  [MB] Daniela Heck CCC-SLP [MB] LEONIDES Michaud    Additional 3 Communication Treatment    Additional 3 Progress   80%;without cues  -MB    Recorded by   [MB] LEONIDES Michaud    Dysphagia/Swallow Intervention    Dysphagia/Swallow Therapeutic Feed  Pt completed trials of thin liquids with no overt s/s of aspiration.   -MB     Recorded by  [MB] LEONIDES Michaud     Bed Mobility, Assessment/Treatment    Bed Mobility, Assistive Device bed rails  -      Bed Mob, Supine to Sit, [place] conditional independence  -      Recorded by [MF] Uma Restrepo PTA      Transfer Assessment/Treatment    Transfers, Sit-Stand [place] independent  -      Transfers, Stand-Sit [place] independent  -      Transfer, Comment stood x 4  -      Recorded by [MF] Uma Restrepo PTA      Gait Assessment/Treatment    Gait, [place] Level stand by assist;contact guard assist  -      Gait, Assistive Device rolling walker  -      Gait, Distance (Feet) 200  -      Gait, Gait Deviations mile decreased;decreased heel strike;step length decreased  -      Gait, Safety Issues balance decreased during turns;step length decreased  -      Gait, Impairments impaired balance  -      Gait, Comment Pt. did well with rwx, but required it for decreased balance..  -MF      Recorded by [MF] Uma Restrepo PTA      Motor Skills/Interventions    Additional Documentation Balance Skills Training (Group)  -MF      Recorded by [] Uma Restrepo PTA      Balance Skills Training    Sitting-Level of Assistance Independent  -      Sitting-Balance Support Feet supported  -      Standing-Level of Assistance Contact guard;Close supervision  -      Static Standing Balance Support --   One UE at a time.  -      Standing-Balance Activities  Reaching for objects;Reaching across midline   standing leg exercises.  -      Standing Balance # of Minutes 8  -MF      Gait Balance-Level of Assistance Contact guard  -      Gait Balance Support assistive device;Left upper extremity supported;Right upper extremity supported  -MF      Recorded by [MF] Uma Restrepo PTA      Therapy Exercises    Bilateral Lower Extremities AROM:;20 reps;sitting;ankle pumps/circles;LAQ   Hip flexion and ABD/ADD 15 reps standing.  -MF      Recorded by [MF] Uma Restrepo PTA      Positioning and Restraints    Pre-Treatment Position in bed  -MF      Post Treatment Position chair  -MF      In Chair sitting;call light within reach;encouraged to call for assist  -MF      Recorded by [MF] Uma Restrepo PTA        07/17/17 1321 07/17/17 0820 07/16/17 1055    Rehab Assessment/Intervention    Discipline  physical therapy assistant  -AE speech language pathologist  -KW    Document Type  therapy note (daily note)  -AE     Subjective Information  agree to therapy;complains of;pain  -AE no complaints  -KW    Patient Effort, Rehab Treatment   excellent  -KW    Precautions/Limitations  fall precautions   c-diff, trach  -AE     Recorded by  [AE] Flor Camp PTA [KW] Daniela Bentley, MS CCC-SLP    Pain Assessment    Pain Assessment  0-10  -AE     Pain Score  8  -AE     Pain Location  Foot  -AE     Pain Orientation  Left;Right  -AE     Pain Descriptors  Aching;Burning  -AE     Pain Frequency  Intermittent  -AE     Recorded by  [AE] Flor Camp PTA     Dysphagia/Swallow Intervention    Dysphagia/Swallow Therapeutic Feed Pt will tolerate trials of current diet with no s/s of aspiration.  -MB  Took trials of thin liquids.  Difficulty to determine safety.  No overt coughing but some wet voice vs harsh vocal quality.  Will complete Dysphagia Study Monday to determine safet diet consistency.  -KW    Recorded by [MB] Daniela Heck, CCC-SLP  [KW] Daniela Bentley, MS CCC-SLP     Bed Mobility, Assessment/Treatment    Bed Mobility, Comment  up in chair   assisted with dressing  -AE     Recorded by  [AE] Flor Camp PTA     Transfer Assessment/Treatment    Transfers, Sit-Stand Snyder  minimum assist (75% patient effort);verbal cues required  -AE     Transfers, Stand-Sit [place]  contact guard assist;verbal cues required  -AE     Recorded by  [AE] Flor Camp PTA     Gait Assessment/Treatment    Gait, [place] Level  contact guard assist  -AE     Gait, Assistive Device  rolling walker  -AE     Gait, Distance (Feet)  15   Pt in too much pain to walk  -AE     Gait, Gait Pattern Analysis  swing-to gait  -AE     Gait, Gait Deviations  bilateral:;mile decreased;antalgic  -AE     Gait, Safety Issues  step length decreased  -AE     Gait, Impairments  strength decreased  -AE     Recorded by  [AE] Flor Camp PTA     Therapy Exercises    Bilateral Lower Extremities  AROM:;10 reps;sitting  -AE     Recorded by  [AE] Flor Camp PTA     Sensory Assessment/Intervention    Sensory Impairment  hypersensitivity   B LE More on L  -AE     Recorded by  [AE] Flor Camp PTA     Positioning and Restraints    Pre-Treatment Position  bathroom  -AE     Post Treatment Position  bed  -AE     In Bed  sitting EOB;call light within reach  -AE     Recorded by  [AE] Flor Camp PTA       07/15/17 1130          Dysphagia/Swallow Intervention    Dysphagia/Swallow Therapeutic Feed Patient will tolerate upgraded diet consistency with SLP w/ no overt s/s of aspiration.  -KW      Recorded by [KW] Daniela Bentley, MS CCC-SLP        User Key  (r) = Recorded By, (t) = Taken By, (c) = Cosigned By    Initials Name Effective Dates    KASSIE Heck [facility]-SLP 08/02/16 -     AE Flor Camp, PTA 06/22/15 -     KW Daniela Bentley MS [facility]-SLP 08/02/16 -     MF Uma Restrepo, [facility] 08/02/16 -                 IP PT Goals       07/16/17 1441 07/10/17 0810       Bed Mobility PT LTG    Bed  Mobility PT LTG, Date Established 07/16/17  -MARJORIE 07/10/17  -MARJORIE     Bed Mobility PT LTG, Time to Achieve by discharge  -MARJORIE by discharge  -MARJORIE     Bed Mobility PT LTG, Activity Type all bed mobility  -MARJORIE roll left/roll right  -MARJORIE     Bed Mobility PT LTG, [place] Level independent  -MARJORIE moderate assist (50% patient effort)  -MARJORIE     Bed Mobility PT Goal  LTG, Assist Device bed rails  -MARJORIE bed rails  -MARJORIE     Bed Mobility PT LTG, Date Goal Reviewed 07/16/17  -MARJORIE      Bed Mobility PT LTG, Outcome goal revised  -MARJORIE      Bed Mobility PT LTG, Reason Goal Not Met goals revised this date   d/c 7/10 goal, goal updated  -MARJORIE      Transfer Training PT LTG    Transfer Training PT LTG, Date Established 07/16/17  -MARJORIE      Transfer Training PT LTG, Time to Achieve by discharge  -MARJORIE      Transfer Training PT LTG, Activity Type bed to chair /chair to bed;sit to stand/stand to sit  -MARJORIE      Transfer Training PT LTG, [place] Level conditional independence;independent  -MARJORIE      Transfer Training PT LTG, Assist Device walker, rolling  -MARJORIE      Gait Training PT LTG    Gait Training Goal PT LTG, Date Established 07/16/17  -MARJORIE      Gait Training Goal PT LTG, Time to Achieve by discharge  -MARJORIE      Gait Training Goal PT LTG, [place] Level supervision required;conditional independence  -MARJORIE      Gait Training Goal PT LTG, Assist Device walker, rolling  -MARJORIE      Gait Training Goal PT LTG, Distance to Achieve 200  -MARJORIE      Stair Training PT LTG    Stair Training Goal PT LTG, Date Established 07/16/17  -MARJORIE      Stair Training Goal PT LTG, Time to Achieve by discharge  -MARJORIE      Stair Training Goal PT LTG, Number of Steps 3  -MARJORIE      Stair Training Goal PT LTG, [place] Level contact guard assist  -MARJORIE      Stair Training Goal PT LTG, Distance to Achieve Attempt stairs if pt. to d/c home  -MARJORIE      Strength Goal PT LTG    Strength Goal PT LTG, Date Established  07/10/17  -MARJORIE     Strength Goal PT LTG, Time to Achieve  by discharge  -MARJORIE      Strength Goal PT LTG, Measure to Achieve  AAROM/AROM, B UE/LE, 10-20 reps, min assist  -MARJORIE     Strength Goal PT LTG, Date Goal Reviewed 07/16/17  -MARJORIE      Strength Goal PT LTG, Outcome goal partially met  -MARJORIE      Strength Goal PT LTG, Reason Goal Not Met progress slower than expected  -MARJORIE      Physical Therapy PT LTG    Physical Therapy PT LTG, Date Established  07/10/17  -MARJORIE     Physical Therapy PT LTG, Time to Achieve  by discharge  -MARJORIE     Physical Therapy PT LTG, Activity Type  No new evidence of skin breakdown or contractures while pt. on the vent.   -MARJORIE     Physical Therapy PT LTG, Date Goal Reviewed 07/16/17  -MARJORIE      Physical Therapy PT LTG, Outcome goal met  -MARJORIE      Physical Therapy PT LTG, Reason Goal Not Met goals revised this date   d/c goal  -MARJORIE        User Key  (r) = Recorded By, (t) = Taken By, (c) = Cosigned By    Initials Name Provider Type    MARJORIE Olsen, PT DPT Physical Therapist          Physical Therapy Education     Title: PT OT SLP Therapies (Active)     Topic: Physical Therapy (Active)     Point: Mobility training (Done)    Learning Progress Summary    Learner Readiness Method Response Comment Documented by Status   Patient Acceptance E,D NR,VU Educated pt. on benefits of activity, gait, safety concerns, and progression of care.  07/18/17 1121 Done    Acceptance E NR t/f's benefits of activity  07/17/17 0901 Active    Acceptance E VU,DU,NR Educated pt/family on progression of PT POC and benefits of activity MARJORIE 07/16/17 1441 Done    Acceptance E,D DU Benefits of activity;ex's SUKHDEEP 07/14/17 0920 Done    Acceptance E,D NR Benefit of activities; ex's  07/13/17 0847 Active    Nonacceptance E NL Pt. sedated on vent, no evidence of learning.  07/11/17 0905 Active   Family Acceptance E VU,DU,NR Educated pt/family on progression of PT POC and benefits of activity MARJORIE 07/16/17 1441 Done               Point: Home exercise program (Done)    Learning Progress Summary    Learner Readiness  Method Response Comment Documented by Status   Patient Acceptance E,D NR,VU Educated pt. on benefits of activity, gait, safety concerns, and progression of care.  07/18/17 1121 Done    Nonacceptance E NL Pt. sedated on vent, no evidence of learning.  07/12/17 0837 Active    Nonacceptance E NL Pt. sedated on vent, no evidence of learning.  07/11/17 0905 Active    Acceptance E NR Educated pt on progression of PT POC and benefits of activity  07/10/17 0810 Active               Point: Body mechanics (Active)    Learning Progress Summary    Learner Readiness Method Response Comment Documented by Status   Patient Nonacceptance E NL Pt. sedated on vent, no evidence of learning.  07/11/17 0905 Active               Point: Precautions (Active)    Learning Progress Summary    Learner Readiness Method Response Comment Documented by Status   Patient Nonacceptance E NL Pt. sedated on vent, no evidence of learning.  07/11/17 0905 Active                      User Key     Initials Effective Dates Name Provider Type Discipline    AE 06/22/15 -  Flor Camp, PTA Physical Therapy Assistant PT    MARJORIE 08/02/16 -  Jaime Olsen, PT DPT Physical Therapist PT    SUKHDEEP 08/02/16 -  Britt Fuller, PTA Physical Therapy Assistant PT     08/02/16 -  Uma Restrepo, PTA Physical Therapy Assistant PT                    PT Recommendation and Plan  Anticipated Discharge Disposition: home with assist, home with home health, home with 24/7 care, transitional care, skilled nursing facility, inpatient rehabilitation facility (pending progress)  Planned Therapy Interventions: bed mobility training, transfer training, gait training, balance training, home exercise program, patient/family education, postural re-education, stair training, strengthening  PT Frequency: daily, 2 times/day  Plan of Care Review  Plan Of Care Reviewed With: patient  Progress: progress toward functional goals as expected  Outcome Summary/Follow up Plan: Pt.  progressing well with therapy. Pt. is independent with bed mobility and transfers. Pt. walked 200' with CGA/SBA with rwx. Pt. is having decreased balance at times and the rwx is beneficial for this at this time. Will continue to work on balance, strengthening, and stair climbing with pt.          Outcome Measures       07/18/17 1048 07/17/17 0820 07/16/17 1441    How much help from another person do you currently need...    Turning from your back to your side while in flat bed without using bedrails? 4  -MF 3  -AE 3  -MARJORIE    Moving from lying on back to sitting on the side of a flat bed without bedrails? 4  -MF 3  -AE 3  -MARJORIE    Moving to and from a bed to a chair (including a wheelchair)? 4  -MF 3  -AE 3  -MARJORIE    Standing up from a chair using your arms (e.g., wheelchair, bedside chair)? 4  - 3  -AE 3  -MARJORIE    Climbing 3-5 steps with a railing? 3  - 2  -AE 2  -MARJORIE    To walk in hospital room? 3  - 3  -AE 3  -MARJORIE    AM-PAC 6 Clicks Score 22  - 17  -AE 17  -MARJORIE    Functional Assessment    Outcome Measure Options AM-PAC 6 Clicks Basic Mobility (PT)  -MF AM-PAC 6 Clicks Basic Mobility (PT)  -AE AM-PAC 6 Clicks Basic Mobility (PT)  -MARJORIE      User Key  (r) = Recorded By, (t) = Taken By, (c) = Cosigned By    Initials Name Provider Type    NAYA Camp PTA Physical Therapy Assistant    MARJORIE Olsen, PT DPT Physical Therapist    MULU Restrepo PTA Physical Therapy Assistant           Time Calculation:         PT Charges       07/18/17 1124          Time Calculation    Start Time 1048  -      Stop Time 1115  -      Time Calculation (min) 27 min  -      PT Non-Billable Time (min) 4 min  -      PT Received On 07/18/17  -      PT Goal Re-Cert Due Date 07/26/17  -      Time Calculation- PT    Total Timed Code Minutes- PT 23 minute(s)  -        User Key  (r) = Recorded By, (t) = Taken By, (c) = Cosigned By    Initials Name Provider Type    MULU Restrepo PTA Physical Therapy Assistant           Therapy Charges for Today     Code Description Service Date Service Provider Modifiers Qty    64226296187 HC GAIT TRAINING EA 15 MIN 7/18/2017 Uma Restrepo PTA GP 1    07557367686 HC PT THER PROC EA 15 MIN 7/18/2017 Uma Restrepo PTA GP 1          PT G-Codes  Outcome Measure Options: AM-PAC 6 Clicks Basic Mobility (PT)  Score: 17  Functional Limitation: Mobility: Walking and moving around  Mobility: Walking and Moving Around Current Status (): At least 40 percent but less than 60 percent impaired, limited or restricted  Mobility: Walking and Moving Around Goal Status (): At least 1 percent but less than 20 percent impaired, limited or restricted    Uma Restrepo PTA  7/18/2017

## 2017-07-18 NOTE — PLAN OF CARE
Problem: Patient Care Overview (Adult)  Goal: Plan of Care Review  Outcome: Ongoing (interventions implemented as appropriate)    07/18/17 2453   Coping/Psychosocial Response Interventions   Plan Of Care Reviewed With patient   Patient Care Overview   Progress progress toward functional goals as expected   Outcome Evaluation   Outcome Summary/Follow up Plan Pt. improving with therapy. Pt. is independent for transfers. Pt. walked without rwx and was CGA-SBA without any LOB. Pt. went up and down 9 steps without difficulty. Pt. performed standing exercises and balance activities.. Pt's O2 sats 99% on room air after activity. Pt. is planning on returning home with his parents when discharged. Pt. would benefit from home health services for a short time to improve balance and strength

## 2017-07-18 NOTE — PROGRESS NOTES
Continued Stay Note   Серг[facility]     Patient Name: Nishant Savage  MRN: [id number]  Today's Date: 7/18/2017    Admit Date: 7/3/2017          Discharge Plan       07/18/17 0903    Case Management/Social Work Plan    Plan Home with mother and home health    Patient/Family In Agreement With Plan yes    Additional Comments Left a message for Dr. Dangelo Jerome's nurse [phone], to request orders for trach supplies if pt will be going home with it.               Discharge Codes     None        Expected Discharge Date and Time     Expected Discharge Date Expected Discharge Time    Jul 7, 2017             FLAKITA Petty

## 2017-07-18 NOTE — PLAN OF CARE
Problem: Patient Care Overview (Adult)  Goal: Plan of Care Review  Outcome: Ongoing (interventions implemented as appropriate)    Problem: Fall Risk (Adult)  Goal: Absence of Falls  Outcome: Ongoing (interventions implemented as appropriate)    Problem: Pressure Ulcer Risk (Roman Scale) (Adult,Obstetrics,Pediatric)  Goal: Skin Integrity  Outcome: Ongoing (interventions implemented as appropriate)

## 2017-07-18 NOTE — THERAPY TREATMENT NOTE
Acute Care - Physical Therapy Treatment Note  [facility]     Patient Name: Nishant Savage  : 1965  MRN: [id number]  Today's Date: 2017  Onset of Illness/Injury or Date of Surgery Date: 17  Date of Referral to PT: 17  Referring Physician: Dr. Perez    Admit Date: 7/3/2017    Visit Dx:    ICD-10-CM ICD-9-CM   1. Oropharyngeal dysphagia R13.12 787.22   2. Seizures R56.9 780.39   3. Angioedema, sequela T78.3XXS 909.9   4. Alcohol withdrawal, with delirium F10.231 291.0   5. Impaired mobility Z74.09 799.89   6. Voice absence R49.1 784.41     Patient Active Problem List   Diagnosis   • Hyperlipidemia   • HTN (hypertension)   • Gout   • Anxiety   • Arthritis   • Erectile dysfunction   • Angio-edema   • Seizures   • HCAP (healthcare-associated pneumonia)               Adult Rehabilitation Note       17 1549 17 1048 17 1034    Rehab Assessment/Intervention    Discipline physical therapy assistant  - physical therapy assistant  - speech language pathologist  -MB    Document Type therapy note (daily note)  - therapy note (daily note)  - therapy note (daily note)  -MB    Subjective Information agree to therapy;complains of;pain  - agree to therapy;no complaints  - no complaints;agree to therapy  -MB    Patient Effort, Rehab Treatment   good  -MB    Precautions/Limitations fall precautions   c-diff, trach  - fall precautions   c-diff, impulsive, trach, but able to verbalize  -     Recorded by [] Uma Restrepo PTA [] Uma Restrepo PTA [MB] Daniela Heck, CCC-SLP    Pain Assessment    Pain Assessment 0-10  - No/denies pain  -     Pain Score 6  -      Post Pain Score 9  -      Pain Type Acute pain  -      Pain Location Foot  -      Pain Orientation Right;Left  -      Pain Descriptors Aching  -      Pain Frequency Intermittent  -      Pain Intervention(s) Repositioned  -      Response to Interventions tolerated, notified nursing  -       Recorded by [MF] Uma Restrepo PTA [MF] Uma Restrepo PTA     Additional 1 Communication Treatment    Additional 1 Progress   100%;discontinue achieved  -MB    Recorded by   [MB] Daniela Heck CCC-SLP    Additional 2 Communication Treatment    Additional 2 Progress   100%;discontinue achieved  -MB    Recorded by   [MB] KRISTINE MichaudSLP    Dysphagia/Swallow Intervention    Dysphagia/Swallow Therapeutic Feed   Pt completed trials of thin liquids with no overt s/s of aspiration.   -MB    Recorded by   [MB] Daniela Heck CCC-SLP    Bed Mobility, Assessment/Treatment    Bed Mobility, Assistive Device  bed rails  -     Bed Mob, Supine to Sit, [place]  conditional independence  -     Bed Mobility, Comment up in chair  -MF      Recorded by [MF] Uma Restrepo PTA [MF] Uma Restrepo PTA     Transfer Assessment/Treatment    Transfers, Sit-Stand [place] independent  - independent  -MF     Transfers, Stand-Sit [place] independent  - independent  -     Transfer, Comment stood x 3  -MF stood x 4  -MF     Recorded by [MF] Uma Restrepo PTA [MF] Uma Restrepo PTA     Gait Assessment/Treatment    Gait, [place] Level stand by assist;contact guard assist  - stand by assist;contact guard assist  -     Gait, Assistive Device  rolling walker  -     Gait, Distance (Feet) --   300' CGA and 125' SBA  - 200  -MF     Gait, Gait Deviations mile decreased;decreased heel strike;step length decreased  -MF mile decreased;decreased heel strike;step length decreased  -     Gait, Safety Issues balance decreased during turns;weight-shifting ability decreased  - balance decreased during turns;step length decreased  -     Gait, Impairments impaired balance  - impaired balance  -     Gait, Comment ambulated without rwx and did not have any LOB  -MF Pt. did well with rwx, but required it for decreased balance..  -MF     Recorded by [MF] Uma  ROGER Restrepo PTA [] Uma Restrepo PTA     Stairs Assessment/Treatment    Number of Stairs 9  -      Stairs, Handrail Location left side (ascending)  -      Stairs, [place] Level verbal cues required;contact guard assist  -      Stairs, Technique Used step over step (ascending);step to step (descending)  -      Recorded by [] Uma Restrepo PTA      Motor Skills/Interventions    Additional Documentation  Balance Skills Training (Group)  -     Recorded by  [] Uma Restrepo PTA     Balance Skills Training    Sitting-Level of Assistance  Independent  -     Sitting-Balance Support  Feet supported  -     Standing-Level of Assistance  Contact guard;Close supervision  -     Static Standing Balance Support  --   One UE at a time.  -     Standing-Balance Activities  Reaching for objects;Reaching across midline   standing leg exercises.  -     Standing Balance # of Minutes  8  -     Gait Balance-Level of Assistance Close supervision;Contact guard  - Contact guard  -     Gait Balance Support No upper extremity supported  - assistive device;Left upper extremity supported;Right upper extremity supported  -     Gait Balance Activities backwards;side-stepping  -      Gait Balance # of Minutes 5  -      Recorded by [] Uma Restrepo PTA [] Uma Restrepo PTA     Therapy Exercises    Bilateral Lower Extremities AROM:;15 reps;10 reps;standing;heel raises;hip abduction/adduction;hip flexion;mini squats  - AROM:;20 reps;sitting;ankle pumps/circles;LAQ   Hip flexion and ABD/ADD 15 reps standing.  -     Recorded by [] Uma Restrepo PTA [] Uma Restrepo PTA     Positioning and Restraints    Pre-Treatment Position sitting in chair/recliner  - in bed  -     Post Treatment Position chair  - chair  -     In Chair notified nsg;sitting;call light within reach;encouraged to call for assist;with family/caregiver  - sitting;call light within  reach;encouraged to call for assist  -MF     Recorded by [MF] Uma Restrepo PTA [] Uma Restrepo, NATALIE       07/17/17 1435 07/17/17 1321 07/17/17 0820    Rehab Assessment/Intervention    Discipline speech language pathologist  -MB  physical therapy assistant  -AE    Document Type therapy note (daily note)  -MB  therapy note (daily note)  -AE    Subjective Information agree to therapy;pain  -MB  agree to therapy;complains of;pain  -AE    Patient Effort, Rehab Treatment good  -MB      Precautions/Limitations   fall precautions   c-diff, trach  -AE    Recorded by [MB] LEONIDES Michaud  [AE] Flor Camp PTA    Pain Assessment    Pain Assessment 0-10  -MB  0-10  -AE    Pain Score 10  -MB  8  -AE    Pain Location Foot  -MB  Foot  -AE    Pain Orientation Left  -MB  Left;Right  -AE    Pain Descriptors   Aching;Burning  -AE    Pain Frequency   Intermittent  -AE    Recorded by [MB] LEONIDES Michaud  [AE] Flor Camp PTA    Additional 1 Communication Treatment    Additional 1 Progress 100%;without cues  -MB      Recorded by [MB] LEONIDES Michaud      Additional 2 Communication Treatment    Additional 2 Progress 100%;without cues  -MB      Recorded by [MB] LEONIDES Michaud      Additional 3 Communication Treatment    Additional 3 Progress 80%;without cues  -MB      Recorded by [MB] LEONIDES Michaud      Dysphagia/Swallow Intervention    Dysphagia/Swallow Therapeutic Feed  Pt will tolerate trials of current diet with no s/s of aspiration.  -MB     Recorded by  [MB] LEONIDES Michaud     Bed Mobility, Assessment/Treatment    Bed Mobility, Comment   up in chair   assisted with dressing  -AE    Recorded by   [AE] Flor Camp PTA    Transfer Assessment/Treatment    Transfers, Sit-Stand [place]   minimum assist (75% patient effort);verbal cues required  -AE    Transfers, Stand-Sit [place]   contact guard assist;verbal cues required  -AE     Recorded by   [AE] Flor Camp PTA    Gait Assessment/Treatment    Gait, [place] Level   contact guard assist  -AE    Gait, Assistive Device   rolling walker  -AE    Gait, Distance (Feet)   15   Pt in too much pain to walk  -AE    Gait, Gait Pattern Analysis   swing-to gait  -AE    Gait, Gait Deviations   bilateral:;mile decreased;antalgic  -AE    Gait, Safety Issues   step length decreased  -AE    Gait, Impairments   strength decreased  -AE    Recorded by   [AE] Flor Camp PTA    Therapy Exercises    Bilateral Lower Extremities   AROM:;10 reps;sitting  -AE    Recorded by   [AE] Flor Camp PTA    Sensory Assessment/Intervention    Sensory Impairment   hypersensitivity   B LE More on L  -AE    Recorded by   [AE] Flor Camp PTA    Positioning and Restraints    Pre-Treatment Position   bathroom  -AE    Post Treatment Position   bed  -AE    In Bed   sitting EOB;call light within reach  -AE    Recorded by   [AE] Flor Camp PTA      07/16/17 1055          Rehab Assessment/Intervention    Discipline speech language pathologist  -KW      Subjective Information no complaints  -KW      Patient Effort, Rehab Treatment excellent  -KW      Recorded by [KW] Daniela Bentley MS CCC-SLP      Dysphagia/Swallow Intervention    Dysphagia/Swallow Therapeutic Feed Took trials of thin liquids.  Difficulty to determine safety.  No overt coughing but some wet voice vs harsh vocal quality.  Will complete Dysphagia Study Monday to determine safet diet consistency.  -KW      Recorded by [KW] Daniela Bentley MS CCC-SLP        User Key  (r) = Recorded By, (t) = Taken By, (c) = Cosigned By    Initials Name Effective Dates    KASSIE Heck [facility]-SLP 08/02/16 -     AE Flor Camp, PTA 06/22/15 -     KW Daniela Bentley MS CCC-SLP 08/02/16 -      Uma Restrepo, [facility] 08/02/16 -                 IP PT Goals       07/16/17 1441 07/10/17 0810       Bed Mobility PT LTG    Bed Mobility PT LTG, Date Established  07/16/17  -MARJORIE 07/10/17  -MARJORIE     Bed Mobility PT LTG, Time to Achieve by discharge  -MARJORIE by discharge  -MARJORIE     Bed Mobility PT LTG, Activity Type all bed mobility  -MARJORIE roll left/roll right  -MARJORIE     Bed Mobility PT LTG, [place] Level independent  -MARJORIE moderate assist (50% patient effort)  -MARJORIE     Bed Mobility PT Goal  LTG, Assist Device bed rails  -MARJORIE bed rails  -MARJORIE     Bed Mobility PT LTG, Date Goal Reviewed 07/16/17  -MARJORIE      Bed Mobility PT LTG, Outcome goal revised  -MARJORIE      Bed Mobility PT LTG, Reason Goal Not Met goals revised this date   d/c 7/10 goal, goal updated  -MARJORIE      Transfer Training PT LTG    Transfer Training PT LTG, Date Established 07/16/17  -MARJORIE      Transfer Training PT LTG, Time to Achieve by discharge  -MARJOREI      Transfer Training PT LTG, Activity Type bed to chair /chair to bed;sit to stand/stand to sit  -MARJORIE      Transfer Training PT LTG, [place] Level conditional independence;independent  -MARJORIE      Transfer Training PT LTG, Assist Device walker, rolling  -MARJORIE      Gait Training PT LTG    Gait Training Goal PT LTG, Date Established 07/16/17  -MARJORIE      Gait Training Goal PT LTG, Time to Achieve by discharge  -MARJORIE      Gait Training Goal PT LTG, [place] Level supervision required;conditional independence  -MARJORIE      Gait Training Goal PT LTG, Assist Device walker, rolling  -MARJORIE      Gait Training Goal PT LTG, Distance to Achieve 200  -MARJORIE      Stair Training PT LTG    Stair Training Goal PT LTG, Date Established 07/16/17  -MARJORIE      Stair Training Goal PT LTG, Time to Achieve by discharge  -MARJORIE      Stair Training Goal PT LTG, Number of Steps 3  -MARJORIE      Stair Training Goal PT LTG, [place] Level contact guard assist  -MARJORIE      Stair Training Goal PT LTG, Distance to Achieve Attempt stairs if pt. to d/c home  -MARJORIE      Strength Goal PT LTG    Strength Goal PT LTG, Date Established  07/10/17  -MARJORIE     Strength Goal PT LTG, Time to Achieve  by discharge  -MARJORIE     Strength Goal PT LTG, Measure to  Achieve  AAROM/AROM, B UE/LE, 10-20 reps, min assist  -MARJORIE     Strength Goal PT LTG, Date Goal Reviewed 07/16/17  -MARJORIE      Strength Goal PT LTG, Outcome goal partially met  -MARJORIE      Strength Goal PT LTG, Reason Goal Not Met progress slower than expected  -MARJORIE      Physical Therapy PT LTG    Physical Therapy PT LTG, Date Established  07/10/17  -MARJORIE     Physical Therapy PT LTG, Time to Achieve  by discharge  -MARJORIE     Physical Therapy PT LTG, Activity Type  No new evidence of skin breakdown or contractures while pt. on the vent.   -MARJORIE     Physical Therapy PT LTG, Date Goal Reviewed 07/16/17  -MARJORIE      Physical Therapy PT LTG, Outcome goal met  -MARJORIE      Physical Therapy PT LTG, Reason Goal Not Met goals revised this date   d/c goal  -MARJORIE        User Key  (r) = Recorded By, (t) = Taken By, (c) = Cosigned By    Initials Name Provider Type    MARJORIE Olsen, PT DPT Physical Therapist          Physical Therapy Education     Title: PT OT SLP Therapies (Active)     Topic: Physical Therapy (Active)     Point: Mobility training (Done)    Learning Progress Summary    Learner Readiness Method Response Comment Documented by Status   Patient Acceptance E,D NR,VU Educated pt. on benefits of activity, gait, safety concerns, and progression of care.  07/18/17 1121 Done    Acceptance E NR t/f's benefits of activity  07/17/17 0901 Active    Acceptance E VU,DU,NR Educated pt/family on progression of PT POC and benefits of activity MARJORIE 07/16/17 1441 Done    Acceptance E,D DU Benefits of activity;ex's  07/14/17 0920 Done    Acceptance E,D NR Benefit of activities; ex's  07/13/17 0847 Active    Nonacceptance E NL Pt. sedated on vent, no evidence of learning.  07/11/17 0905 Active   Family Acceptance E VU,DU,NR Educated pt/family on progression of PT POC and benefits of activity MARJORIE 07/16/17 1441 Done               Point: Home exercise program (Done)    Learning Progress Summary    Learner Readiness Method Response Comment Documented  by Status   Patient Acceptance E,D NR,VU Educated pt. on benefits of activity, gait, safety concerns, and progression of care.  07/18/17 1121 Done    Nonacceptance E NL Pt. sedated on vent, no evidence of learning.  07/12/17 0837 Active    Nonacceptance E NL Pt. sedated on vent, no evidence of learning.  07/11/17 0905 Active    Acceptance E NR Educated pt on progression of PT POC and benefits of activity  07/10/17 0810 Active               Point: Body mechanics (Active)    Learning Progress Summary    Learner Readiness Method Response Comment Documented by Status   Patient Nonacceptance E NL Pt. sedated on vent, no evidence of learning.  07/11/17 0905 Active               Point: Precautions (Active)    Learning Progress Summary    Learner Readiness Method Response Comment Documented by Status   Patient Nonacceptance E NL Pt. sedated on vent, no evidence of learning.  07/11/17 0905 Active                      User Key     Initials Effective Dates Name Provider Type Discipline    AE 06/22/15 -  Flor Camp, PTA Physical Therapy Assistant PT    MARJORIE 08/02/16 -  Jaime Olsen, PT DPT Physical Therapist PT     08/02/16 -  Britt Fuller, PTA Physical Therapy Assistant PT     08/02/16 -  Uma Restrepo, PTA Physical Therapy Assistant PT                    PT Recommendation and Plan  Anticipated Discharge Disposition: home with assist, home with home health, home with 24/7 care, transitional care, skilled nursing facility, inpatient rehabilitation facility (pending progress)  Planned Therapy Interventions: bed mobility training, transfer training, gait training, balance training, home exercise program, patient/family education, postural re-education, stair training, strengthening  PT Frequency: daily, 2 times/day  Plan of Care Review  Plan Of Care Reviewed With: patient  Progress: progress toward functional goals as expected  Outcome Summary/Follow up Plan: Pt. improving with therapy. Pt. is  independent for transfers. Pt. walked without rwx and was CGA-SBA without any LOB. Pt. went up and down 9 steps without  difficulty. Pt. performed standing exercises and balance activities.. Pt's O2 sats 99% on room air after activity. Pt. is planning on returning home with his parents when discharged.  Pt. would benefit from home health services for a short time to improve balance and strength          Outcome Measures       07/18/17 1549 07/18/17 1048 07/17/17 0820    How much help from another person do you currently need...    Turning from your back to your side while in flat bed without using bedrails? 4  -MF 4  -MF 3  -AE    Moving from lying on back to sitting on the side of a flat bed without bedrails? 4  -MF 4  -MF 3  -AE    Moving to and from a bed to a chair (including a wheelchair)? 4  -MF 4  -MF 3  -AE    Standing up from a chair using your arms (e.g., wheelchair, bedside chair)? 4  -MF 4  -MF 3  -AE    Climbing 3-5 steps with a railing? 3  -MF 3  -MF 2  -AE    To walk in hospital room? 3  -MF 3  -MF 3  -AE    AM-PAC 6 Clicks Score 22  -MF 22  -MF 17  -AE    Functional Assessment    Outcome Measure Options AM-PAC 6 Clicks Basic Mobility (PT)  -MF AM-PAC 6 Clicks Basic Mobility (PT)  -MF AM-PAC 6 Clicks Basic Mobility (PT)  -AE      07/16/17 1441          How much help from another person do you currently need...    Turning from your back to your side while in flat bed without using bedrails? 3  -MARJORIE      Moving from lying on back to sitting on the side of a flat bed without bedrails? 3  -MARJORIE      Moving to and from a bed to a chair (including a wheelchair)? 3  -MARJORIE      Standing up from a chair using your arms (e.g., wheelchair, bedside chair)? 3  -MARJORIE      Climbing 3-5 steps with a railing? 2  -MARJORIE      To walk in hospital room? 3  -MARJORIE      AM-PAC 6 Clicks Score 17  -MARJORIE      Functional Assessment    Outcome Measure Options AM-PAC 6 Clicks Basic Mobility (PT)  -MARJORIE        User Key  (r) = Recorded By, (t) =  Taken By, (c) = Cosigned By    Initials Name Provider Type    NAYA Camp PTA Physical Therapy Assistant    MARJORIE Olsen, PT DPT Physical Therapist    MULU Restrepo PTA Physical Therapy Assistant           Time Calculation:         PT Charges       07/18/17 1624 07/18/17 1124       Time Calculation    Start Time 1549  -MF 1048  -MF     Stop Time 1615  -MF 1115  -MF     Time Calculation (min) 26 min  -MF 27 min  -MF     PT Non-Billable Time (min) 0 min  -MF 4 min  -MF     PT Received On 07/18/17  -MF 07/18/17  -MF     PT Goal Re-Cert Due Date 07/26/17  -MF 07/26/17  -MF     Time Calculation- PT    Total Timed Code Minutes- PT 26 minute(s)  -MF 23 minute(s)  -MF       User Key  (r) = Recorded By, (t) = Taken By, (c) = Cosigned By    Initials Name Provider Type     Uma Restrepo PTA Physical Therapy Assistant          Therapy Charges for Today     Code Description Service Date Service Provider Modifiers Qty    31985302656 HC GAIT TRAINING EA 15 MIN 7/18/2017 Uma Restrepo, PTA GP 1    94951192379 HC PT THER PROC EA 15 MIN 7/18/2017 Uma Restrepo, PTA GP 1    28595018837 HC GAIT TRAINING EA 15 MIN 7/18/2017 Uma Restrepo PTA GP 1    93652050809 HC PT THER PROC EA 15 MIN 7/18/2017 Uma Restrepo PTA GP 1          PT G-Codes  Outcome Measure Options: AM-PAC 6 Clicks Basic Mobility (PT)  Score: 17  Functional Limitation: Mobility: Walking and moving around  Mobility: Walking and Moving Around Current Status (): At least 40 percent but less than 60 percent impaired, limited or restricted  Mobility: Walking and Moving Around Goal Status (): At least 1 percent but less than 20 percent impaired, limited or restricted    Uma Restrepo PTA  7/18/2017

## 2017-07-18 NOTE — PLAN OF CARE
Problem: Patient Care Overview (Adult)  Goal: Plan of Care Review  Outcome: Ongoing (interventions implemented as appropriate)    07/18/17 2690   Coping/Psychosocial Response Interventions   Plan Of Care Reviewed With patient;mother   Patient Care Overview   Progress improving   Outcome Evaluation   Outcome Summary/Follow up Plan Pt now on Regular solids/thin liquids per SLP; Cardiac diet restrictions. PO intake 67%/3meals/1 day. Pt indicates appetite is fair and continues to improve. RDN encouraged intake and will r/s weekly or FU PRN.

## 2017-07-18 NOTE — PROGRESS NOTES
[facility] Progress Note:       LOS: 14 days   Patient Care Team:  Linda Hoffman DNP, EPI as PCP - General  Linda Hoffman DNP, APRN as PCP - Family Medicine    Active consulting complaint: Angioedema, improving    Subjective     Interval History:     Status of active consulting problem: Postop day 6 tracheostomy.  He is doing very well, he denies shortness of breath, stridor, or stertor. He states he is breathing well on room air via trach. He is tolerating the PMV without difficulty. His voice in strong. His tongue swelling remains down. No dysphagia. Tolerating regular diet with thin liquids.     History taken from: patient    Review of Systems:    Review of Systems   Constitutional: Negative for chills and fever.   HENT: Negative for trouble swallowing.         Tongue swelling decreased   Respiratory: Negative for choking, shortness of breath and stridor.    Musculoskeletal: Negative for neck pain.       Objective     Vital Signs  Temp:  [97.4 °F (36.3 °C)-98.5 °F (36.9 °C)] 97.4 °F (36.3 °C)  Heart Rate:  [73-97] 97  Resp:  [18] 18  BP: (110-129)/(69-81) 129/81    Physical Exam:   Physical Exam   Constitutional: He is oriented to person, place, and time. He appears well-developed and well-nourished. He is cooperative. No distress.   HENT:   Head: Normocephalic and atraumatic.   Right Ear: External ear normal.   Left Ear: External ear normal.   Nose: Nose normal.   Mouth/Throat: Oropharynx is clear and moist.   Tongue swelling almost resolved. Tongue fits in the dental arch well. Tongue lacerations healing well.    Eyes: EOM are normal.   Neck: Neck supple.   Trach in place and secure. Voicing well with PMV.    Pulmonary/Chest: Effort normal. No respiratory distress.   On room air   Neurological: He is alert and oriented to person, place, and time. No cranial nerve deficit.   Psychiatric: He has a normal mood and affect. His behavior is normal.        Results Review:       Lab Results (last 24  hours)     Procedure Component Value Units Date/Time    CBC & Differential [[id number]] Collected:  07/18/17 0510    Specimen:  Blood Updated:  07/18/17 0551    Narrative:       The following orders were created for panel order CBC & Differential.  Procedure                               Abnormality         Status                     ---------                               -----------         ------                     CBC Auto Differential[[id number]]        Abnormal            Final result                 Please view results for these tests on the individual orders.    CBC Auto Differential [[id number]]  (Abnormal) Collected:  07/18/17 0510    Specimen:  Blood Updated:  07/18/17 0551     WBC 14.20 (H) 10*3/mm3      RBC 4.00 (L) 10*6/mm3      Hemoglobin 11.8 (L) g/dL      Hematocrit 36.0 (L) %      MCV 90.0 fL      MCH 29.5 pg      MCHC 32.8 (L) g/dL      RDW 14.4 %      RDW-SD 47.6 fl      MPV 10.4 fL      Platelets 413 (H) 10*3/mm3      Neutrophil % 73.2 %      Lymphocyte % 13.7 (L) %      Monocyte % 9.9 %      Eosinophil % 2.3 %      Basophil % 0.2 %      Immature Grans % 0.7 %      Neutrophils, Absolute 10.41 (H) 10*3/mm3      Lymphocytes, Absolute 1.94 10*3/mm3      Monocytes, Absolute 1.40 (H) 10*3/mm3      Eosinophils, Absolute 0.32 10*3/mm3      Basophils, Absolute 0.03 10*3/mm3      Immature Grans, Absolute 0.10 (H) 10*3/mm3     Basic Metabolic Panel [[id number]]  (Abnormal) Collected:  07/18/17 0510    Specimen:  Blood Updated:  07/18/17 0600     Glucose 85 mg/dL      BUN 22 (H) mg/dL      Creatinine 1.17 mg/dL      Sodium 144 mmol/L      Potassium 3.9 mmol/L      Chloride 108 mmol/L      CO2 22.0 (L) mmol/L      Calcium 9.7 mg/dL      eGFR Non African Amer 66 mL/min/1.73      BUN/Creatinine Ratio 18.8     Anion Gap 14.0 (H) mmol/L     Narrative:       GFR Normal >60  Chronic Kidney Disease <60  Kidney Failure <15    Uric Acid [[id number]]  (Normal) Collected:  07/18/17 0510    Specimen:  Blood Updated:   07/18/17 0751     Uric Acid 7.1 mg/dL         Imaging Results (last 24 hours)     Procedure Component Value Units Date/Time    FL Video Swallow With Speech [[id number]] Collected:  07/17/17 1339     Updated:  07/17/17 1343    Narrative:       EXAMINATION: FL VIDEO SWALLOW W SPEECH-  7/17/2017 2:39 PM EDT     HISTORY: Dysphagia     COMPARISON:None     TECHNIQUE:     Image number: 1     Fluoroscopy time: 1.2 minutes     FINDINGS:     The oral and pharyngeal phase of swallowing was evaluated with multiple  barium consistencies.     Patient tolerated all consistencies well without laryngeal penetration  or aspiration.       Impression:       1. Negative dysphagia study.  This report was finalized on 07/17/2017 13:40 by Dr. Pierre Pan MD.          Medication Review:     Current Facility-Administered Medications   Medication Dose Route Frequency Provider Last Rate Last Dose   • acetaminophen (TYLENOL) tablet 650 mg  650 mg Oral Q4H PRN Jairon Perez DO   650 mg at 07/09/17 1003   • albuterol (PROVENTIL) nebulizer solution 0.083% 2.5 mg/3mL  2.5 mg Nebulization Q6H PRN Jairon Perez DO       • amLODIPine (NORVASC) tablet 10 mg  10 mg Per G Tube Q24H Cristino Encinas DO   10 mg at 07/18/17 0827   • carvedilol (COREG) tablet 25 mg  25 mg Oral Q12H Jairon Perez DO   25 mg at 07/18/17 0827   • CloNIDine (CATAPRES-TTS) 0.3 MG/24HR patch 1 patch  1 patch Transdermal Weekly Cristino Encinas DO   1 patch at 07/18/17 0830   • colchicine tablet 0.6 mg  0.6 mg Oral Q12H Jairon Perez DO   0.6 mg at 07/18/17 0827   • diazePAM (VALIUM) tablet 2 mg  2 mg Nasogastric Q12H Jairon Perez DO   2 mg at 07/18/17 0826   • enoxaparin (LOVENOX) syringe 40 mg  40 mg Subcutaneous Daily Cristino Encinas DO   40 mg at 07/18/17 0828   • famotidine (PEPCID) tablet 20 mg  20 mg Per G Tube Q12H Cristino Encinas DO   20 mg at 07/18/17 0827   • flintstones complete (FLINTSTONES) chewable tablet 1 tablet  1 tablet Oral Daily Jairon ADAMS  Ana, DO   1 tablet at 07/18/17 1253   • hydrALAZINE (APRESOLINE) injection 10 mg  10 mg Intravenous Q6H PRN Jairon Perez DO   10 mg at 07/15/17 1058   • HYDROcodone-acetaminophen (NORCO)  MG per tablet 1 tablet  1 tablet Oral Q4H PRN Cristino Encinas, DO   1 tablet at 07/18/17 0826   • HYDROcodone-acetaminophen (NORCO) 5-325 MG per tablet 1 tablet  1 tablet Oral Q4H PRN Cristino Encinas, DO   1 tablet at 07/18/17 1304   • indomethacin SR (INDOCIN SR) CR capsule 75 mg  75 mg Oral BID With Meals Jairon Perez DO   75 mg at 07/18/17 0826   • levETIRAcetam (KEPPRA) tablet 1,000 mg  1,000 mg Oral Q12H Jairon Perez, DO   1,000 mg at 07/18/17 0827   • lidocaine-Maalox-diphenhydramine (MIRACLE MOUTHWASH) oral suspension 30 mL  30 mL Oral 4x Daily PRN Jairon Perez DO       • LORazepam (ATIVAN) injection 1 mg  1 mg Intravenous Q8H PRN Ewrin Glass MD   1 mg at 07/14/17 1307   • losartan (COZAAR) tablet 100 mg  100 mg Oral Q24H Jairon Perez DO   100 mg at 07/18/17 0828   • metroNIDAZOLE (FLAGYL) tablet 500 mg  500 mg Oral Q8H Jairon Perez DO   500 mg at 07/18/17 1304   • ondansetron (ZOFRAN) injection 4 mg  4 mg Intravenous Q6H PRN Jairon Perez DO   4 mg at 07/14/17 1837   • [START ON 7/19/2017] potassium chloride (MICRO-K) CR capsule 40 mEq  40 mEq Oral Daily Jairon Perez DO       • sodium chloride 0.9 % flush 1-10 mL  1-10 mL Intravenous PRN Jairon Perez DO           Assessment/Plan     Active Problems:    Angio-edema    Seizures    HCAP (healthcare-associated pneumonia)       Continue current tracheostomy care.  He is tolerating the PMV without difficulty. Will cap trach today and monitor oxygen saturations. If he does well, we will proceed with overnight pulse ox with trach capped. If he passes overnight pulse ox, will consider decannulation tomorrow.     Beatriz Briggs, APRN  07/18/17  1:21 PM

## 2017-07-18 NOTE — PLAN OF CARE
Problem: Patient Care Overview (Adult)  Goal: Plan of Care Review  Outcome: Ongoing (interventions implemented as appropriate)    07/18/17 1048   Coping/Psychosocial Response Interventions   Plan Of Care Reviewed With patient   Patient Care Overview   Progress progress toward functional goals as expected   Outcome Evaluation   Outcome Summary/Follow up Plan Pt. progressing well with therapy. Pt. is independent with bed mobility and transfers. Pt. walked 200' with CGA/SBA with rwx. Pt. is having decreased balance at times and the rwx is beneficial for this at this time. Will continue to work on balance, strengthening, and stair climbing with pt.

## 2017-07-18 NOTE — PROGRESS NOTES
Continued Stay Note  DONELL Rushing     Patient Name: Nishant Savage  MRN: [id number]  Today's Date: 7/18/2017    Admit Date: 7/3/2017          Discharge Plan       07/18/17 1448    Case Management/Social Work Plan    Plan Home with mother and home health    Patient/Family In Agreement With Plan yes    Additional Comments Spoke with MICHELE Henderson for Dr. Pierson and she said they are capping the trach and doing an overnight pulse ox. If pt does well, they will decannulate him. Will check back tomorrow.               Discharge Codes     None        Expected Discharge Date and Time     Expected Discharge Date Expected Discharge Time    Jul 7, 2017             FLAKITA Petty

## 2017-07-19 ENCOUNTER — TRANSITIONAL CARE MANAGEMENT TELEPHONE ENCOUNTER (OUTPATIENT)
Dept: FAMILY MEDICINE CLINIC | Facility: CLINIC | Age: 52
End: 2017-07-19

## 2017-07-19 VITALS
TEMPERATURE: 97.2 F | RESPIRATION RATE: 20 BRPM | SYSTOLIC BLOOD PRESSURE: 146 MMHG | HEIGHT: 67 IN | BODY MASS INDEX: 33.82 KG/M2 | WEIGHT: 215.5 LBS | OXYGEN SATURATION: 99 % | HEART RATE: 96 BPM | DIASTOLIC BLOOD PRESSURE: 86 MMHG

## 2017-07-19 LAB
ANION GAP SERPL CALCULATED.3IONS-SCNC: 12 MMOL/L (ref 4–13)
BASOPHILS # BLD AUTO: 0.03 10*3/MM3 (ref 0–0.2)
BASOPHILS NFR BLD AUTO: 0.4 % (ref 0–2)
BUN BLD-MCNC: 21 MG/DL (ref 5–21)
BUN/CREAT SERPL: 19.1 (ref 7–25)
CALCIUM SPEC-SCNC: 9.2 MG/DL (ref 8.4–10.4)
CHLORIDE SERPL-SCNC: 112 MMOL/L (ref 98–110)
CO2 SERPL-SCNC: 20 MMOL/L (ref 24–31)
CREAT BLD-MCNC: 1.1 MG/DL (ref 0.5–1.4)
DEPRECATED RDW RBC AUTO: 47.8 FL (ref 40–54)
EOSINOPHIL # BLD AUTO: 0.34 10*3/MM3 (ref 0–0.7)
EOSINOPHIL NFR BLD AUTO: 4 % (ref 0–4)
ERYTHROCYTE [DISTWIDTH] IN BLOOD BY AUTOMATED COUNT: 14.5 % (ref 12–15)
GFR SERPL CREATININE-BSD FRML MDRD: 71 ML/MIN/1.73
GLUCOSE BLD-MCNC: 87 MG/DL (ref 70–100)
HCT VFR BLD AUTO: 32 % (ref 40–52)
HGB BLD-MCNC: 10.3 G/DL (ref 14–18)
IMM GRANULOCYTES # BLD: 0.07 10*3/MM3 (ref 0–0.03)
IMM GRANULOCYTES NFR BLD: 0.8 % (ref 0–5)
LYMPHOCYTES # BLD AUTO: 1.68 10*3/MM3 (ref 0.72–4.86)
LYMPHOCYTES NFR BLD AUTO: 19.8 % (ref 15–45)
MCH RBC QN AUTO: 29 PG (ref 28–32)
MCHC RBC AUTO-ENTMCNC: 32.2 G/DL (ref 33–36)
MCV RBC AUTO: 90.1 FL (ref 82–95)
MONOCYTES # BLD AUTO: 1.18 10*3/MM3 (ref 0.19–1.3)
MONOCYTES NFR BLD AUTO: 13.9 % (ref 4–12)
NEUTROPHILS # BLD AUTO: 5.2 10*3/MM3 (ref 1.87–8.4)
NEUTROPHILS NFR BLD AUTO: 61.1 % (ref 39–78)
PLATELET # BLD AUTO: 357 10*3/MM3 (ref 130–400)
PMV BLD AUTO: 9.9 FL (ref 6–12)
POTASSIUM BLD-SCNC: 4.4 MMOL/L (ref 3.5–5.3)
RBC # BLD AUTO: 3.55 10*6/MM3 (ref 4.8–5.9)
SODIUM BLD-SCNC: 144 MMOL/L (ref 135–145)
WBC NRBC COR # BLD: 8.5 10*3/MM3 (ref 4.8–10.8)

## 2017-07-19 PROCEDURE — 25010000002 ENOXAPARIN PER 10 MG: Performed by: FAMILY MEDICINE

## 2017-07-19 PROCEDURE — G8997 SWALLOW GOAL STATUS: HCPCS

## 2017-07-19 PROCEDURE — 85025 COMPLETE CBC W/AUTO DIFF WBC: CPT | Performed by: INTERNAL MEDICINE

## 2017-07-19 PROCEDURE — 63710000001 FLINTSTONES COMPLETE 60 MG CHEWABLE TABLET: Performed by: INTERNAL MEDICINE

## 2017-07-19 PROCEDURE — 92526 ORAL FUNCTION THERAPY: CPT

## 2017-07-19 PROCEDURE — 99231 SBSQ HOSP IP/OBS SF/LOW 25: CPT | Performed by: NURSE PRACTITIONER

## 2017-07-19 PROCEDURE — 80048 BASIC METABOLIC PNL TOTAL CA: CPT | Performed by: FAMILY MEDICINE

## 2017-07-19 PROCEDURE — G8998 SWALLOW D/C STATUS: HCPCS

## 2017-07-19 RX ORDER — HYDROCODONE BITARTRATE AND ACETAMINOPHEN 5; 325 MG/1; MG/1
1 TABLET ORAL EVERY 4 HOURS PRN
Qty: 12 TABLET | Refills: 0 | Status: SHIPPED | OUTPATIENT
Start: 2017-07-19 | End: 2017-07-22

## 2017-07-19 RX ORDER — LOSARTAN POTASSIUM 100 MG/1
100 TABLET ORAL
Qty: 30 TABLET | Refills: 1 | Status: SHIPPED | OUTPATIENT
Start: 2017-07-19 | End: 2017-09-08 | Stop reason: SDUPTHER

## 2017-07-19 RX ORDER — LEVETIRACETAM 500 MG/1
500 TABLET ORAL EVERY 12 HOURS SCHEDULED
Qty: 60 TABLET | Refills: 1 | Status: SHIPPED | OUTPATIENT
Start: 2017-07-19 | End: 2017-08-17 | Stop reason: SDUPTHER

## 2017-07-19 RX ORDER — COLCHICINE 0.6 MG/1
0.6 TABLET ORAL EVERY 12 HOURS PRN
Qty: 14 TABLET | Refills: 1 | Status: SHIPPED | OUTPATIENT
Start: 2017-07-19 | End: 2017-07-26 | Stop reason: SDUPTHER

## 2017-07-19 RX ORDER — INDOMETHACIN 50 MG/1
50 CAPSULE ORAL 2 TIMES DAILY PRN
Qty: 14 CAPSULE | Refills: 1 | Status: SHIPPED | OUTPATIENT
Start: 2017-07-19 | End: 2017-08-17

## 2017-07-19 RX ORDER — CARVEDILOL 25 MG/1
25 TABLET ORAL EVERY 12 HOURS SCHEDULED
Qty: 60 TABLET | Refills: 1 | Status: SHIPPED | OUTPATIENT
Start: 2017-07-19 | End: 2017-09-11 | Stop reason: SDUPTHER

## 2017-07-19 RX ORDER — AMLODIPINE BESYLATE 10 MG/1
10 TABLET ORAL
Qty: 30 TABLET | Refills: 1 | Status: SHIPPED | OUTPATIENT
Start: 2017-07-19 | End: 2017-09-11 | Stop reason: SDUPTHER

## 2017-07-19 RX ORDER — METRONIDAZOLE 500 MG/1
500 TABLET ORAL EVERY 8 HOURS SCHEDULED
Qty: 6 TABLET | Refills: 0 | Status: SHIPPED | OUTPATIENT
Start: 2017-07-19 | End: 2017-08-17

## 2017-07-19 RX ADMIN — METRONIDAZOLE 500 MG: 500 TABLET, FILM COATED ORAL at 05:57

## 2017-07-19 RX ADMIN — POTASSIUM CHLORIDE 40 MEQ: 750 CAPSULE, EXTENDED RELEASE ORAL at 08:44

## 2017-07-19 RX ADMIN — ENOXAPARIN SODIUM 40 MG: 40 INJECTION SUBCUTANEOUS at 08:44

## 2017-07-19 RX ADMIN — HYDROCODONE BITARTRATE AND ACETAMINOPHEN 1 TABLET: 10; 325 TABLET ORAL at 05:57

## 2017-07-19 RX ADMIN — Medication 1 TABLET: at 13:44

## 2017-07-19 RX ADMIN — HYDROCODONE BITARTRATE AND ACETAMINOPHEN 1 TABLET: 10; 325 TABLET ORAL at 13:44

## 2017-07-19 RX ADMIN — LOSARTAN POTASSIUM 100 MG: 50 TABLET ORAL at 08:43

## 2017-07-19 RX ADMIN — COLCHICINE 0.6 MG: 0.6 TABLET, FILM COATED ORAL at 08:43

## 2017-07-19 RX ADMIN — CARVEDILOL 25 MG: 25 TABLET, FILM COATED ORAL at 08:43

## 2017-07-19 RX ADMIN — METRONIDAZOLE 500 MG: 500 TABLET, FILM COATED ORAL at 13:44

## 2017-07-19 RX ADMIN — LEVETIRACETAM 1000 MG: 500 TABLET, FILM COATED ORAL at 08:44

## 2017-07-19 RX ADMIN — FAMOTIDINE 20 MG: 20 TABLET, FILM COATED ORAL at 08:44

## 2017-07-19 RX ADMIN — DIAZEPAM 2 MG: 2 TABLET ORAL at 08:44

## 2017-07-19 RX ADMIN — INDOMETHACIN 75 MG: 75 CAPSULE, EXTENDED RELEASE ORAL at 08:43

## 2017-07-19 RX ADMIN — AMLODIPINE BESYLATE 10 MG: 10 TABLET ORAL at 08:43

## 2017-07-19 NOTE — DISCHARGE PLACEMENT REQUEST
"[facility]  [phone]    Nishant Wahl (52 y.o. Male)     Date of Birth Social Security Number Address Home Phone MRN    1965  [address] [facility] [id number] [phone] [id number]    Jew Marital Status          Hinduism Single       Admission Date Admission Type Admitting Provider Attending Provider Department, Room/Bed    7/3/17 Emergency Jairon Perez DO Hancock, John C, DO [address], 479/1    Discharge Date Discharge Disposition Discharge Destination         Home or Self Care             Attending Provider: Jairon Perez DO     Allergies:  Lipitor [Atorvastatin]    Isolation:  Spore   Infection:  C.difficile (17)   Code Status:  FULL    Ht:  67\" (170.2 cm)   Wt:  215 lb 8 oz (97.8 kg)    Admission Cmt:  None   Principal Problem:  None                Active Insurance as of 7/3/2017     Primary Coverage     Payor Plan Insurance Group Employer/Plan Group    [facility]      Payor Plan Address Payor Plan Phone Number Effective From Effective To    [address] [address]  7/3/2017     [place] [address]       Subscriber Name Subscriber Birth Date Member ID       NISHANT WAHL 1965 [id number]                 Emergency Contacts      (Rel.) Home Phone Work Phone Mobile Phone    Yvette Pollack [phone] -- [phone]        [address] [address]  Phone:  [phone]  Fax:          Patient:     Nishant Wahl MRN:  [id number]   [address] [facility] [address] :  1965  SSN:    Phone: [phone] Sex:  M      INSURANCE PAYOR PLAN GROUP # SUBSCRIBER ID   [facility] [place] [id number]   [id number]   Admitting Diagnosis: Angio-edema, initial encounter [T78.3XXA]  Order Date:  2017         Inpatient Consult to Case Management        (Order ID: [id number])     Diagnosis:         Priority:  " "Routine Expected Date:   Expiration Date:        Interval:   Count:    Reason for Consult? need home health for trach care     Specimen Type:   Specimen Source:   Specimen Taken Date:   Specimen Taken Time:                   Verbal Order Mode: Verbal with readback   Authorizing Provider: Jairon Perez DO  Authorizing Provider's NPI: [id number]     Order Entered By: Surekha Whittaker RN 7/19/2017  3:46 PM     Electronically signed by:              History & Physical      Jairon Perez DO at 7/3/2017  3:39 PM              [facility]  HISTORY AND PHYSICAL    Date of Admission: 7/3/2017  Primary Care Physician: Linda Hoffman, DNP, APRN    Subjective     Chief Complaint: tongue swelling preceded possible seizure activity    History of Present Illness  This is a 52-year-old  gentleman who resides at home in [place], [place].  He sees Linda Hoffman for primary care.  He has a long-standing history of hypertension.  He tells me that this morning he went to his father's home to discuss with him the fact that his tongue had been swelling and feeling thick since early this morning.  He tells me that he was speaking with his father and ultimately the next thing that he remembers was having tunnel vision and waking up with EMS there.  His father told EMS that the patient had \"diffuse shaking throughout his body.\"  The patient tells me that he has never had a seizure.  There is no family history of seizure.  He apparently had taken his blood pressure while at his father's home and he had a 212 systolic reading.    His blood pressures typically well-controlled.  He tells me that he has been on lisinopril for \"years.\"  He has never had any problems with tongue swelling with this.  He says that his speech had become thick and he was having difficulty with swallowing.  He ultimately also bit his tongue in the ensuing convulsive episode.  This only complicated " matters.  He was given IV Decadron in the emergency department.  He has been referred for admission secondary to escalated hypertension, tongue swelling, and possible new onset seizure.  However, he has been found to have PRES on his MRI and likely had convulsive syncope after an episode of severe hypertension.    Review of Systems   Constitutional: Negative.    HENT: Positive for drooling. Negative for congestion, facial swelling, sinus pressure, sore throat and trouble swallowing.    Respiratory: Positive for apnea (he has a history of obstructive sleep apnea, but is noncompliant with his device.). Negative for cough, choking, chest tightness, shortness of breath, wheezing and stridor.    Cardiovascular: Positive for leg swelling. Negative for chest pain and palpitations.   Gastrointestinal: Negative.    Genitourinary: Negative.    Musculoskeletal: Negative.    Skin: Negative.    Neurological: Positive for seizures, syncope (likely) and speech difficulty (because of his tongue issue). Negative for dizziness, tremors, weakness, light-headedness and headaches.   Psychiatric/Behavioral: Negative.      Otherwise complete ROS reviewed and negative except as mentioned in the HPI.    Past Medical History:   Past Medical History:   Diagnosis Date   • Anxiety    • Arthritis    • Gout    • HTN (hypertension)    • Hyperlipidemia      Past Surgical History:History reviewed. No pertinent surgical history.    Social History:  reports that he has been smoking Cigarettes.  He has a 9.90 pack-year smoking history. He does not have any smokeless tobacco history on file. He reports that he drinks about 1.2 oz of alcohol per week  He reports that he does not use illicit drugs.    Family History: family history includes Diabetes in his maternal grandmother and mother; Heart attack in his paternal grandfather; Heart disease in his father; Hypertension in his father and mother; Kidney disease in his sister; No Known Problems in his  "daughter, maternal grandfather, paternal grandmother, and son.       Allergies:  Allergies   Allergen Reactions   • Lipitor [Atorvastatin] Rash       Medications:  Prior to Admission medications    Medication Sig Start Date End Date Taking? Authorizing Provider   cloNIDine (CATAPRES) 0.1 MG tablet Take 0.1 mg by mouth every night.   Yes Historical Provider, MD   fenofibrate (TRICOR) 145 MG tablet Take 1 tablet by mouth Every Night. 9/30/16  Yes Linda Hoffman DNP, APRN   ibuprofen (ADVIL,MOTRIN) 800 MG tablet Take 800 mg by mouth Every 6 (Six) Hours As Needed for Moderate Pain (4-6).   Yes Historical Provider, MD   indomethacin (INDOCIN) 50 MG capsule Take 1 capsule by mouth 3 (Three) Times a Day As Needed for mild pain (1-3).  Patient taking differently: Take 50 mg by mouth 3 (Three) Times a Day As Needed for Mild Pain (1-3) (gout). 9/30/16  Yes Linda Hoffman DNP, APRN   lisinopril-hydrochlorothiazide (PRINZIDE,ZESTORETIC) 20-25 MG per tablet Take 1 tablet by mouth Daily. 9/30/16  Yes Linda Hoffman DNP, APRN   metoprolol tartrate (LOPRESSOR) 50 MG tablet Take 50 mg by mouth Every 12 (Twelve) Hours.   Yes Historical Provider, MD   metoprolol tartrate (LOPRESSOR) 50 MG tablet Take 1 tablet by mouth 2 (Two) Times a Day. 9/30/16 7/3/17 Yes Linda Hoffman DNP, APRN   vardenafil (LEVITRA) 10 MG tablet Take 1 tablet by mouth Daily As Needed for erectile dysfunction. 9/30/16   Linda Hoffman DNP, APRN   allopurinol (ZYLOPRIM) 300 MG tablet Take 1 tablet by mouth Daily. prn 9/30/16 7/3/17  Linda Hoffman DNP, APRN   ibuprofen (ADVIL,MOTRIN) 800 MG tablet Take 1 tablet by mouth Every 6 (Six) Hours As Needed for mild pain (1-3) or moderate pain (4-6).  Patient taking differently: Take 800 mg by mouth Every 6 (Six) Hours As Needed for Moderate Pain (4-6). 9/30/16 7/3/17  Crys Dalton, DNP, APRN       Objective     Vital Signs: /93  Pulse 62  Temp 98 °F (36.7 °C)  Resp 18  Ht 68\" (172.7 " cm)  Wt 200 lb (90.7 kg)  SpO2 98%  BMI 30.41 kg/m2  Physical Exam   Constitutional: He is oriented to person, place, and time. He appears well-developed and well-nourished.   HENT:   Head: Normocephalic and atraumatic.   Significant bruising to his tongue as well as a sublingual hematoma.  He does not appear to have any trouble handling secretions at this point in time.   Eyes: Conjunctivae and EOM are normal. Pupils are equal, round, and reactive to light.   Neck: Neck supple. No JVD present. No tracheal deviation present. No thyromegaly present.   Cardiovascular: Normal rate, regular rhythm, normal heart sounds and intact distal pulses.  Exam reveals no gallop and no friction rub.    No murmur heard.  Pulmonary/Chest: Effort normal and breath sounds normal. No stridor. No respiratory distress. He has no wheezes. He has no rales. He exhibits no tenderness.   Abdominal: Soft. Bowel sounds are normal. He exhibits no distension. There is no tenderness. There is no rebound and no guarding.   Musculoskeletal: Normal range of motion. He exhibits edema (1+). He exhibits no tenderness or deformity.   Lymphadenopathy:     He has no cervical adenopathy.   Neurological: He is alert and oriented to person, place, and time. He displays normal reflexes. No cranial nerve deficit. He exhibits normal muscle tone.   Skin: Skin is warm and dry. No rash noted.   Psychiatric: He has a normal mood and affect. His behavior is normal. Judgment and thought content normal.     Results Reviewed:  Lab Results (last 24 hours)     Procedure Component Value Units Date/Time    CBC & Differential [[id number]] Collected:  07/03/17 0807    Specimen:  Blood Updated:  07/03/17 0832    Narrative:       The following orders were created for panel order CBC & Differential.  Procedure                               Abnormality         Status                     ---------                               -----------         ------                     CBC  Auto Differential[[id number]]        Abnormal            Final result                 Please view results for these tests on the individual orders.    CBC Auto Differential [[id number]]  (Abnormal) Collected:  07/03/17 0807    Specimen:  Blood Updated:  07/03/17 0832     WBC 11.95 (H) 10*3/mm3      RBC 3.93 (L) 10*6/mm3      Hemoglobin 11.9 (L) g/dL      Hematocrit 34.1 (L) %      MCV 86.8 fL      MCH 30.3 pg      MCHC 34.9 g/dL      RDW 14.0 %      RDW-SD 44.0 fl      MPV 9.6 fL      Platelets 245 10*3/mm3      Neutrophil % 82.0 (H) %      Lymphocyte % 8.3 (L) %      Monocyte % 8.9 %      Eosinophil % 0.3 %      Basophil % 0.2 %      Immature Grans % 0.3 %      Neutrophils, Absolute 9.81 (H) 10*3/mm3      Lymphocytes, Absolute 0.99 10*3/mm3      Monocytes, Absolute 1.06 10*3/mm3      Eosinophils, Absolute 0.03 10*3/mm3      Basophils, Absolute 0.02 10*3/mm3      Immature Grans, Absolute 0.04 (H) 10*3/mm3     Comprehensive Metabolic Panel [[id number]]  (Abnormal) Collected:  07/03/17 0807    Specimen:  Blood Updated:  07/03/17 0841     Glucose 127 (H) mg/dL      BUN 9 mg/dL      Creatinine 1.19 mg/dL      Sodium 141 mmol/L      Potassium 3.0 (L) mmol/L      Chloride 102 mmol/L      CO2 29.0 mmol/L      Calcium 8.8 mg/dL      Total Protein 6.9 g/dL      Albumin 4.00 g/dL      ALT (SGPT) 39 U/L      AST (SGOT) 72 (H) U/L      Alkaline Phosphatase 69 U/L      Total Bilirubin 0.6 mg/dL      eGFR Non African Amer 64 mL/min/1.73      Globulin 2.9 gm/dL      A/G Ratio 1.4 g/dL      BUN/Creatinine Ratio 7.6     Anion Gap 10.0 mmol/L     Urinalysis With / Culture If Indicated [[id number]]  (Abnormal) Collected:  07/03/17 0834    Specimen:  Urine from Urine, Clean Catch Updated:  07/03/17 0858     Color, UA Yellow     Appearance, UA Clear     pH, UA 7.5     Specific Gravity, UA 1.014     Glucose, UA Negative     Ketones, UA Negative     Bilirubin, UA Negative     Blood, UA Small (1+) (A)     Protein, UA 30 mg/dL (1+) (A)     Leuk  Esterase, UA Negative     Nitrite, UA Negative     Urobilinogen, UA 0.2 E.U./dL    Urinalysis, Microscopic Only [[id number]]  (Abnormal) Collected:  07/03/17 0834    Specimen:  Urine from Urine, Clean Catch Updated:  07/03/17 0858     RBC, UA 3-5 (A) /HPF      WBC, UA 0-2 (A) /HPF      Bacteria, UA None Seen /HPF      Squamous Epithelial Cells, UA 0-2 /HPF      Hyaline Casts, UA None Seen /LPF      Methodology Automated Microscopy    Urine Drug Screen [[id number]]  (Normal) Collected:  07/03/17 0834    Specimen:  Urine from Urine, Clean Catch Updated:  07/03/17 0930     Amphetamine Screen, Urine Negative     Barbiturates Screen, Urine Negative     Benzodiazepine Screen, Urine Negative     Cocaine Screen, Urine Negative     Methadone Screen, Urine Negative     Opiate Screen Negative     Phencyclidine (PCP), Urine Negative     THC, Screen, Urine Negative    Narrative:       Negative Thresholds For Drugs Screened in Urine:    Amphetamines          500 ng/ml  Barbiturates          200 ng/ml  Benzodiazepines       200 ng/ml  Cocaine               150 ng/ml  Methadone             150 ng/ml  Opiates               300 ng/ml  Phencyclidine         25 ng/ml  THC                      50 ng/ml    The normal value for all drugs tested is negative. This report includes final unconfirmed screening results.  A positive result by this assay can be, at your request, sent to the Reference Lab for confirmation by gas chromatography. Unconfirmed results must not be used for non-medical purposes, such as employment or legal testing. Clinical consideration should be applied to any drug of abuse test result, particularly when unconfirmed results are used.        Imaging Results (last 24 hours)     Procedure Component Value Units Date/Time    XR Shoulder 2+ View Right [[id number]] Collected:  07/03/17 0913     Updated:  07/03/17 0918    Narrative:       EXAMINATION: XR SHOULDER 2+ VW RIGHT-     7/3/2017 10:02 AM EDT     HISTORY: Right shoulder  pain.     Right shoulder, 3 views.     Old healed fracture of the right clavicle at the junction of the mid and  distal one third.  Bony hypertrophy of the distal clavicle with narrowing of the outlet and  increased risk for impingement.  No AC joint separation.     Mild humeral head spurring.     High riding humeral head with the appearance of rotator cuff  insufficiency.     Summary:  1. Bony narrowing of the outlet based on the clavicle.  2. High riding humeral head.  3. MRI correlation recommended to assess the rotator cuff integrity..  This report was finalized on 07/03/2017 09:15 by Dr. Camilo Almodovar MD.    CT Head Without Contrast [[id number]] Collected:  07/03/17 1114     Updated:  07/03/17 1123    Narrative:       EXAMINATION: CT HEAD WO CONTRAST-      7/3/2017 9:43 AM EDT     HISTORY: seizures     In order to have a CT radiation dose as low as reasonably achievable  Automated Exposure Control was utilized for adjustment of the mA and/or  KV according to patient size.     DLP in mGycm= 821.     Ill-defined low density within both occipital lobes. No mass effect. No  acute hemorrhage.     Normal ventricle size.     No skull fracture is seen.  Clear paranasal sinuses.     Summary:  1. Bilateral occipital lobe hypodensity compatible with subacute  ischemic change. MRI correlation recommended.  2. Report called to Dr. Zamarripa in the emergency room at 11:15 AM.                                   This report was finalized on 07/03/2017 11:20 by Dr. Camilo Almodovar MD.    MRI Brain With & Without Contrast [[id number]] Collected:  07/03/17 1238     Updated:  07/03/17 1306    Narrative:       MRI BRAIN W WO CONTRAST- 7/3/2017 11:57 AM CDT     HISTORY: Ischemic changes; R13.12-Dysphagia, oropharyngeal phase     COMPARISON: None.       TECHNIQUE: Multiplanar imaging of the brain was performed in a routine  fashion before and after the intravenous injection of gadolinium  contrast.     FINDINGS:   Diffusion: No restriction of  diffusion to suggest acute ischemia.     Midline structures: Nondisplaced.     Ventricles: Normal in configuration and symmetric in size.     Masses: No masses or mass effect.     Basilar cisterns: Maintained.     Extra axial space: No abnormal extra-axial fluid.     Gray-white matter signal: There is abnormal signal. There is bilateral  subcortical signal on the T2 and FLAIR sequences involving the  cerebellum and posterior occipital and parietal lobes. There is no  associated enhancement. Most likely this is posterior reversible  encephalopathy syndrome. (P RES)     Cerebellum: Abnormal signal is noted in the cerebellum most likely  associated with the pres.     Brainstem: Normal.     Enhancement: No abnormal enhancement.     Other: Proximal cervical spinal cord is normal. Bilateral globes and  orbits are normal in appearance. Normal cerebrovascular flow voids  noted. Mucous cyst is noted in the right maxillary antrum mucosal  thickening is present in the left maxillary antrum       Impression:       1. Posterior reversible encephalopathy syndrome. Follow-up in 8 weeks is  suggested.         This report was finalized on 07/03/2017 12:53 by Dr. Adolph Echols MD.        I have personally reviewed and interpreted the radiology studies and ECG obtained at time of admission.     Assessment / Plan     Assessment & Plan   1.  Possible angioedema related to lisinopril.  2.  Escalated hypertension.  3.  Possible new onset seizure disorder versus convulsive syncope.  4.  Posterior reversible encephalopathy syndrome on MRI.  5.  Tongue bite with sublingual hematoma.  6.  Tobacco abuse.  7.  Obstructive sleep apnea, noncompliant with device.  8.  Gouty arthritis.    He will be admitted to my service at [facility].  He is placed in the cardiac care unit to follow his blood pressure and also be placed on a Cardene drip.  I will escalate his antihypertensive regimen and obviously hold his lisinopril.  Also wanted to  do a 2-D echocardiogram given his edema and difficult to control hypertension.    Consult neurology to evaluate the finding of PRES on his MRI.  I doubt very seriously that he had a true seizure today.  He likely had an episode related to this finding or also possibly convulsive syncope.  I doubt he will need antiepileptic drugs.  I will leave whether or not he needs an EEG to the neurologist.    Consult otolaryngology to evaluate his tongue hematoma and sublingual hematoma.  Hopefully this issue will not worsen.  He also apparently had angioedema that preceded this issue.  Remain on IV Decadron and Pepcid.  Hold his nonsteroidal anti-inflammatory drugs for now as well.  Pain control with oral Norco or IV Dilaudid.    SCDs for DVT prophylaxis.    Code Status: Full.      I discussed the patients findings and my recommendations with the patient, his daughter, and his nurse, Ginny.     Estimated length of stay is at least overnight.     Jairon Perez DO   07/03/17   3:39 PM               Electronically signed by Jairon Perez DO at 7/3/2017  4:10 PM           Discharge Summary      Jairon Perez DO at 7/19/2017 10:18 AM              [facility]  DISCHARGE SUMMARY       Date of Admission: 7/3/2017  Date of Discharge:  7/19/2017  Primary Care Physician: Linda Hoffman, SUSHMA, APRN    Presenting Problem/History of Present Illness:  Tongue swelling which preceded possible seizure activity.     Final Discharge Diagnoses:  1. Possible angioedema related to lisinopril prior to presentation and tongue bite.  2. Malignant hypertension at presentation and still difficult to control.   3. Possible new onset seizure disorder versus convulsive syncope.  4. Posterior reversible encephalopathy syndrome on MRI.  5. Tongue bite with sublingual hematoma, much improved.  6. Tobacco abuse.  7. Obstructive sleep apnea, noncompliant with device.  8. Daily alcohol use with recent  Delirium tremens requiring sedation and mechanical ventilation.  9. Hypokalemia, replaced.   10. Clostridium difficile colitis.  11. Possible lingual or sublingual infection, MSSA on culture.  12. Bilateral lower lobe infiltrates consistent with ventilator associated pneumonia combined with atelectasis.  13. Acute gouty arthritis of the left great toe.     Consults:   1. Dr. Campos and Dr. Arroyo with neurology.   2. Dr. Gallo and Dr. Petersen with pulmonary.   3. Dr. Pierson with ENT.   4. Dr. Minna Tran with general surgery.     Procedures Performed: Tracheostomy with Dr. Pierson. Central line placement by Dr. Minna Tran.      Pertinent Test Results:   Imaging Results (all)     Procedure Component Value Units Date/Time    XR Shoulder 2+ View Right [[id number]] Collected:  07/03/17 0913     Updated:  07/03/17 0918    Narrative:       EXAMINATION: XR SHOULDER 2+ VW RIGHT-     7/3/2017 10:02 AM EDT     HISTORY: Right shoulder pain.     Right shoulder, 3 views.     Old healed fracture of the right clavicle at the junction of the mid and  distal one third.  Bony hypertrophy of the distal clavicle with narrowing of the outlet and  increased risk for impingement.  No AC joint separation.     Mild humeral head spurring.     High riding humeral head with the appearance of rotator cuff  insufficiency.     Summary:  1. Bony narrowing of the outlet based on the clavicle.  2. High riding humeral head.  3. MRI correlation recommended to assess the rotator cuff integrity..  This report was finalized on 07/03/2017 09:15 by Dr. Camilo Almodovar MD.    CT Head Without Contrast [[id number]] Collected:  07/03/17 1114     Updated:  07/03/17 1123    Narrative:       EXAMINATION: CT HEAD WO CONTRAST-      7/3/2017 9:43 AM EDT     HISTORY: seizures     In order to have a CT radiation dose as low as reasonably achievable  Automated Exposure Control was utilized for adjustment of the mA and/or  KV according to patient size.     DLP in mGycm=  821.     Ill-defined low density within both occipital lobes. No mass effect. No  acute hemorrhage.     Normal ventricle size.     No skull fracture is seen.  Clear paranasal sinuses.     Summary:  1. Bilateral occipital lobe hypodensity compatible with subacute  ischemic change. MRI correlation recommended.  2. Report called to Dr. Zamarripa in the emergency room at 11:15 AM.                                   This report was finalized on 07/03/2017 11:20 by Dr. Camilo Almodovar MD.    MRI Brain With & Without Contrast [[id number]] Collected:  07/03/17 1238     Updated:  07/03/17 1306    Narrative:       MRI BRAIN W WO CONTRAST- 7/3/2017 11:57 AM CDT     HISTORY: Ischemic changes; R13.12-Dysphagia, oropharyngeal phase     COMPARISON: None.       TECHNIQUE: Multiplanar imaging of the brain was performed in a routine  fashion before and after the intravenous injection of gadolinium  contrast.     FINDINGS:   Diffusion: No restriction of diffusion to suggest acute ischemia.     Midline structures: Nondisplaced.     Ventricles: Normal in configuration and symmetric in size.     Masses: No masses or mass effect.     Basilar cisterns: Maintained.     Extra axial space: No abnormal extra-axial fluid.     Gray-white matter signal: There is abnormal signal. There is bilateral  subcortical signal on the T2 and FLAIR sequences involving the  cerebellum and posterior occipital and parietal lobes. There is no  associated enhancement. Most likely this is posterior reversible  encephalopathy syndrome. (P RES)     Cerebellum: Abnormal signal is noted in the cerebellum most likely  associated with the pres.     Brainstem: Normal.     Enhancement: No abnormal enhancement.     Other: Proximal cervical spinal cord is normal. Bilateral globes and  orbits are normal in appearance. Normal cerebrovascular flow voids  noted. Mucous cyst is noted in the right maxillary antrum mucosal  thickening is present in the left maxillary antrum        Impression:       1. Posterior reversible encephalopathy syndrome. Follow-up in 8 weeks is  suggested.         This report was finalized on 07/03/2017 12:53 by Dr. Adolph Echols MD.    XR Chest 1 View [[id number]] Collected:  07/04/17 1543     Updated:  07/04/17 1548    Narrative:       EXAMINATION: XR CHEST 1 VW- 7/4/2017 14:43 CST     HISTORY: Intubation     Comparison: None     Findings:  Endotracheal tube is in place with tip approximately 3 cm above the  jolly. Low lung volumes are present bilaterally. There is atelectasis  at the left lung base. The lung bases otherwise appear clear. The  pulmonary vasculature appears normal.       Impression:       Impression:  1. Apparent appropriate endotracheal tube position.  2. Left basilar atelectasis.  This report was finalized on 07/04/2017 15:45 by Dr. Driss Ward MD.    XR Chest 1 View [[id number]] Collected:  07/05/17 0724     Updated:  07/05/17 0728    Narrative:       Frontal supine radiograph of the chest 7/5/2017 3:10 AM CDT     History: Intubated Patient; R13.12-Dysphagia, oropharyngeal phase;  R56.9-Unspecified convulsions; T78.3XXS-Angioneurotic edema, sequela;  F10.231-Alcohol dependence with withdrawal delirium     Comparison: 07/04/2017      Findings:   Lines and tubes are stable in position. No new opacities or  pneumothoraces are visualized in the chest. The cardiomediastinal  silhouette and pulmonary vascularity are unchanged.       No acute osseous or soft tissue abnormality is noted.        Impression:       Impression:   1. No significant interval change since previous exam.        This report was finalized on 07/05/2017 07:25 by Dr. Dilshad Silver MD.    XR Abdomen KUB [[id number]] Collected:  07/05/17 1207     Updated:  07/05/17 1210    Narrative:       EXAMINATION:   XR ABDOMEN KUB-  7/5/2017 12:07 PM CDT     HISTORY: NG tube placement     Single view the abdomen is obtained. Nasogastric tube is present with  the distal tip satisfactorily  positioned in the fundus the stomach.  Bowel gas pattern is nonspecific.       Impression:       Satisfactory placement of nasogastric tube  This report was finalized on [id number] by Dr. Adolph Echols MD.    XR Chest 1 View [[id number]] Collected:  07/06/17 0718     Updated:  07/06/17 0722    Narrative:       Frontal supine radiograph of the chest 7/6/2017 3:04 AM CDT     History: Intubated Patient; R13.12-Dysphagia, oropharyngeal phase;  R56.9-Unspecified convulsions; T78.3XXS-Angioneurotic edema, sequela;  F10.231-Alcohol dependence with withdrawal delirium     Comparison: 07/05/2017      Findings:   The endotracheal tube is stable in position. An NG tube has been  advanced into the stomach.. No new opacities or pneumothoraces are  visualized in the chest. The cardiomediastinal silhouette and pulmonary  vascularity are unchanged.       No acute osseous or soft tissue abnormality is noted.        Impression:       Impression:   1. No significant interval change since previous exam.        This report was finalized on 07/06/2017 07:19 by Dr. Dilshad Silver MD.    XR Chest 1 View [[id number]] Collected:  07/07/17 0708     Updated:  07/07/17 0712    Narrative:       EXAMINATION:   XR CHEST 1 VW-  7/7/2017 7:08 AM CDT     HISTORY: Intubated patient     Single view the chest obtained. The lungs are clear. Cardiac silhouettes  mildly enlarged. Endotracheal tube nasogastric tube are satisfactorily  position.     This compared prior study 07/06/2017.     Obdulia and stable single view chest  This report was finalized on 07/07/2017 07:09 by Dr. Adolph Echols MD.    XR Chest 1 View [[id number]] Collected:  07/07/17 2137     Updated:  07/07/17 2153    Narrative:       EXAMINATION: XR CHEST 1 VW- 7/7/2017 9:31 PM CDT     HISTORY: Central line placement.     COMPARISON: 07/07/2017.     FINDINGS:  The left subclavian central line has been placed with tip in the  proximal SVC. No pneumothorax is identified. The endotracheal  and  enteric tubes remain in place. Scattered areas of subsegmental  atelectasis are present. The heart is magnified but felt to be mildly  enlarged. The pulmonary vasculature is stable.       Impression:       Interval placement of left subclavian central line without  evidence of pneumothorax.  This report was finalized on 07/07/2017 21:50 by Dr. Driss Ward MD.    US Venous Doppler Upper Extremity Left (duplex) [[id number]] Collected:  07/08/17 0719     Updated:  07/08/17 0723    Narrative:       EXAMINATION:   US VENOUS DOPPLER UPPER EXTREMITY LEFT (DUPLEX)-   7/8/2017 7:19 AM CDT     HISTORY: Venous Doppler analysis of the left upper extremity.     Irineo vein demonstrates compression and color flow.     Left subclavian vein demonstrates color flow and augmentation.     Axillary vein demonstrates compression color flow and augmentation.     The brachial vein demonstrates color flow and compression. The left  basilic vein demonstrates lack of compression above the elbow. This lack  of compression is consistent with thrombus in the basilic vein. The  cephalic vein demonstrates color flow compression. The radial vein  demonstrates color flow compression. The ulnar vein demonstrates color  flow compression.       Impression:       Thrombus is visualized in the left basilic vein above the  elbow.  This report was finalized on 07/08/2017 07:20 by Dr. Adolph Echols MD.    US Arterial Doppler Upper Extremity Left [[id number]] Collected:  07/08/17 0720     Updated:  07/08/17 0725    Narrative:       EXAMINATION:   US ARTERIAL DOPPLER UPPER EXTREMITY  LEFT-  7/8/2017 7:20  AM CDT     HISTORY: Left upper extremity duplex arterial analysis     On this examination the left subclavian artery demonstrates color flow  and peak systolic velocity 45 cm/s. Axillary artery demonstrates color  flow with a peak systolic velocity 104 cm/s. The brachial artery  demonstrates peak systolic velocity 130 cm/s with triphasic  waveform.  The radial artery demonstrates color flow and triphasic waveform peak  systolic velocity 78 cm/s. The ulnar artery demonstrates triphasic  waveform color flow and peak systolic velocity 67 cm/s.       Impression:       Negative left upper extremity arterial duplex ultrasound  This report was finalized on 07/08/2017 07:22 by Dr. Adolph Echols MD.    XR Chest 1 View [[id number]] Collected:  07/08/17 0846     Updated:  07/08/17 0935    Narrative:       EXAMINATION:   XR CHEST 1 VW-  7/8/2017 8:45 AM CDT     HISTORY: Frontal supine radiograph of the chest 7/8/2017 3:22 AM CDT     COMPARISON: 07/07/2017.     FINDINGS:   The lungs are clear. The cardiomediastinal silhouette and pulmonary  vascularity are within normal limits. Endotracheal tube is  satisfactorily positioned. Left subclavian catheter is unchanged.     The osseous structures and surrounding soft tissues demonstrate no acute  abnormality.       Impression:       1. No radiographic evidence of acute cardiopulmonary process.     This report was finalized on 07/08/2017 09:31 by Dr. Adolph Echols MD.    CT Head With & Without Contrast [[id number]] Collected:  07/08/17 1233     Updated:  07/08/17 1239    Narrative:       CT BRAIN without and with contrast dated 7/8/2017 11:43 AM CDT     HISTORY: Possible sublingual abscess     COMPARISON: None      DOSE LENGTH PRODUCT: 1779 mGy cm     In order to have a CT radiation dose as low as reasonably achievable,  Automated Exposure Control was utilized for adjustment of the mA and/or  KV according to patient size.     TECHNIQUE: Serial axial tomographic images of the brain were obtained  without and with the use of intravenous contrast.      FINDINGS:   The midline structures are nondisplaced. The ventricles and basilar  cisterns are normal in size and configuration. There is no evidence of  intracranial hemorrhage or mass-effect. The gray-white matter  differentiation is maintained. The sulci have a normal  configuration,  and there are no abnormal extra-axial fluid collections. The structures  of the posterior fossa are unremarkable.      The included orbits and their contents are unremarkable.    . The  visualized osseous structures and overlying soft tissues of the skull  and face are unremarkable.      There is no abnormal enhancement.       Impression:       1. Negative CT brain without and with contrast        This report was finalized on 07/08/2017 12:36 by Dr. Adolph Echols MD.    CT Soft Tissue Neck With & Without Contrast [[id number]] Collected:  07/08/17 1258     Updated:  07/08/17 1307    Narrative:       CT SOFT TISSUE NECK W WO CONTRAST- 7/8/2017 11:43 AM CDT     HISTORY: Possible sublingual or ligual abcess; R13.12-Dysphagia,  oropharyngeal phase; R56.9-Unspecified convulsions;  T78.3XXS-Angioneurotic edema, sequela; F10.231-Alcohol dependence with  withdrawal delirium      COMPARISON: NONE      DOSE LENGTH PRODUCT: 602 mGy cm. Automated exposure control was also  utilized to decrease patient radiation dose.     TECHNIQUE: Serial helical tomographic images of the neck were obtained  in the standard fashion following the intravenous administration of  Isovue contrast.       FINDINGS:    Orbits, paranasal sinuses, and skull base: Normal     Nasopharynx: A nasogastric tube is present. Fluid is present in the  right maxillary antrum mucosal thickening is present left maxillary  antrum     Suprahyoid neck: Endotracheal tube is present. Pleural cavities obscured  by dental work. The base the tongue appears symmetric as visualized.  Submandibular glands are visualized. Sternomastoids sternocleidomastoid  muscles are symmetric.     Infrahyoid neck: Normal larynx, hypopharynx and supraglottis.     Thyroid: Normal.     Thoracic inlet: Pleural thickening is noted in the apex of the right  chest.  Lymph nodes: Normal. No lymphadenopathy.     Vascular structures: Normal.     Bones: Degenerative disc disease and  degenerative spondylosis is present  in the cervical spine.     Other findings: None.       Impression:       1. No acute abnormalities identified on the enhanced CT soft tissue the  neck.  2. Nasogastric tube and endotracheal tube are present. Artifact from  dental work is noted.  3. Fluid in the right maxillary antrum is present  4. Degenerative spondylosis in the cervical spine        This report was finalized on 07/08/2017 13:04 by Dr. Adolph Echols MD.    XR Chest 1 View [[id number]] Collected:  07/09/17 0729     Updated:  07/09/17 0733    Narrative:       EXAMINATION:   XR CHEST 1 VW-  7/9/2017 7:29 AM CDT     HISTORY: Patient intubated     Single view the chest obtained. Atelectasis right lung base is noted.  Left lung is clear. Cardiac silhouettes mildly enlarged. Nasogastric  tube and endotracheal tube are satisfactorily position.       Impression:       Mild atelectasis right lung base slightly increased since  July 8  This report was finalized on 07/09/2017 07:30 by Dr. Adolph Echols MD.    XR Chest 1 View [[id number]] Collected:  07/10/17 0708     Updated:  07/10/17 0712    Narrative:       EXAMINATION:   XR CHEST 1 VW-  7/10/2017 7:08 AM CDT     HISTORY: Intubated     Single view the chest obtained. Left lung is clear. Cardiac silhouettes  mildly enlarged unchanged from July 9. Left subclavian catheter is  present. Nasogastric tubes present. Mild atelectasis right lung base is  present.       Impression:       Atelectasis right lung base persists not significantly  changed from July 9.        2. Mild-to-moderate cardiomegaly also unchanged  This report was finalized on 07/10/2017 07:09 by Dr. Adolph Echols MD.    XR Abdomen KUB [[id number]] Collected:  07/10/17 1314     Updated:  07/10/17 1319    Narrative:       HISTORY: Abdomen distended and firm.     FINDINGS: KUB radiograph demonstrates a nonspecific bowel gas pattern.  There is noted to be a distended loop of what I suspect represents the  sigmoid  colon with a sigmoid volvulus in the differential. I do not see  evidence of obstruction or pneumatosis. There is no evidence of  pneumoperitoneum.       Impression:       . Nonspecific bowel gas pattern with gas in both small bowel  and colon. There is some asymmetric distention of what appears to be a  loop of the sigmoid colon with a sigmoid volvulus a differential  although considered unlikely given the lack of distention of the more  proximal bowel and the patient's age. If symptoms are persistent follow  up with CT imaging could be obtained for further characterization.  This report was finalized on 07/10/2017 13:16 by Dr. Dilshad Silver MD.    CT Abdomen Pelvis Without Contrast [[id number]] Collected:  07/10/17 2035     Updated:  07/10/17 2047    Narrative:       EXAMINATION: CT ABDOMEN PELVIS WO CONTRAST- 7/10/2017 8:35 PM CDT     HISTORY: Abdominal distention and pain, possible sigmoid volvulus.     DOSE: 1821 mGycm (Automatic exposure control technique was implemented  in an effort to keep the radiation dose as low as possible without  compromising image quality)     REPORT: Spiral CT of the abdomen and pelvis was performed without  intravenous or oral contrast from the lung bases through the pubic  symphysis. Reconstructed coronal and sagittal images are also reviewed.  Comparison: KUB on the same date. There is no previous CT. Lung windows  demonstrate consolidation of the lower lobe laterally with air  bronchograms, probable bilateral pneumonia mixed with atelectasis. There  are tiny bilateral pleural effusions. A nasogastric tube is present in  good position. There is mild cardiomegaly. The liver and spleen appear  homogeneous. The gallbladder is slightly contracted. Pancreas and  adrenal glands are within normal limits. No nephrolithiasis is seen and  there is no hydronephrosis. There is perinephric fat stranding  bilaterally, which is nonspecific. There is no evidence of bowel  obstruction or  sigmoid volvulus. The sigmoid colon is redundant. There  is moderate sigmoid diverticulosis. There is nonspecific increased  attenuation of fat planes in the pelvis, including presacral fat planes  and fat planes at the level of the aortic bifurcation and inguinal  regions. There is also mildly increased attenuation of fat planes over  the flanks, slightly greater on the right. There are fat containing  inguinal hernias bilaterally. No intra-abdominal abscess or free air is  identified. The inflammatory changes do not appear to be associated with  the sigmoid colon. The appendix is normal. There is a small  fat-containing periumbilical hernia that measures 2.6 cm. Review of bone  windows is unremarkable.       Impression:       1. No evidence of bowel obstruction or sigmoid volvulus. There is  moderate sigmoid diverticulosis without acute diverticulitis.  2. Nonspecific mild fat stranding/inflammation within the  retroperitoneum and extraperitoneal fat planes in the pelvis, without  evidence of an abscess.  3. Extensive infiltrates in the lower lobes with air bronchograms  suspicious for acute lateral lower lobe pneumonia probably combined with  atelectasis.        This report was finalized on 07/10/2017 20:44 by Dr. Marvin Mar MD.    XR Chest 1 View [[id number]] Collected:  07/11/17 0722     Updated:  07/11/17 0726    Narrative:       EXAMINATION:   XR CHEST 1 VW-  7/11/2017 7:22 AM CDT     HISTORY: Respiratory failure.     Frontal upright radiograph of the chest 7/11/2017 3:15 AM CDT     COMPARISON: 07/10/2017.     FINDINGS:   The lungs are clear. Cardiac silhouettes mildly enlarged. Left  subclavian catheter and endotracheal tube are present and satisfactorily  positioned.      The osseous structures and surrounding soft tissues demonstrate no acute  abnormality.       Impression:       1. No radiographic evidence of acute cardiopulmonary process.        This report was finalized on 07/11/2017 07:23 by   Adolph Echols MD.    XR Chest 1 View [[id number]] Collected:  07/12/17 0705     Updated:  07/12/17 0710    Narrative:       EXAMINATION:   XR CHEST 1 VW-  7/12/2017 7:05 AM CDT     HISTORY: Respiratory failure     Single view the chest obtained. Left lung is clear. Mild right  infrahilar atelectasis is present. Left diaphragms indistinct consistent  with atelectasis left lower lobe. Infiltrate tube nasogastric tube are  present. Left subclavian catheter is present.     IMPRESSION  1. Atelectasis left lower lobe.        2. Mild atelectasis present in the right infrahilar region.  This report was finalized on 07/12/2017 07:07 by Dr. Adolph Echols MD.    XR Abdomen KUB [[id number]] Collected:  07/12/17 0713     Updated:  07/12/17 0716    Narrative:       EXAMINATION:   XR ABDOMEN KUB-  7/12/2017 7:13 AM CDT     HISTORY: NG tube     Single view the abdomen is obtained. Nasogastric tube is present  satisfactorily positioned in the fundus the stomach.       Impression:       Satisfactory placement of nasogastric tube  This report was finalized on 07/12/2017 07:13 by Dr. Adolph Echols MD.    XR Chest 1 View [[id number]] Collected:  07/13/17 0705     Updated:  07/13/17 0709    Narrative:       EXAMINATION: XR CHEST 1 VW-. 7/13/2017 7:05 AM CDT     CHEST, ONE VIEW:     HISTORY: Respiratory failure     COMPARISON: 07/12/2017     A single frontal chest radiograph was obtained.     FINDINGS:     Tracheostomy tube and NG tube identified. Left-sided central venous  catheter observed.     Patchy lower lobe airspace opacities again noted with diminished lung  volumes with shallow inspiration likely stable.                                     Impression:       1. Stable one-day appearance the chest.     This report was finalized on 07/13/2017 07:06 by Dr. Pierre Pan MD.    US Renal Artery Complete [[id number]] Collected:  07/12/17 2008     Updated:  07/13/17 1645    Narrative:       EXAMINATION: US RENAL ARTERY COMPLETE- 7/12/2017  8:08 PM CDT     HISTORY: Uncontrolled hypertension.     REPORT: Sonographic images of the kidneys were obtained, examination  includes Doppler analysis of the renal arteries, using color, gray scale  and spectral Doppler techniques were duplex study. There are no  comparison studies.     The right kidney measures 10.3 x 5.5 x 4.9 cm and has normal morphology  and echogenicity. There is no hydronephrosis. The tail flow velocities  are provided on the ultrasound worksheet, however the origin the right  renal artery is obscured. The mid right renal artery, the peak systolic  velocity measures 116.8 cm/s, this corresponds with the velocity ratio  compared to the abdominal aorta of 1.72. The resistive index measures  0.55. Velocities in the distal right renal arteries are normal.     Left kidney measures 11.5 x 6.7 x 5.3 cm and has normal morphology and  echogenicity, there is no hydronephrosis on the left. Doppler images are  limited for the left kidney, both the origin and left mid renal artery  are obscured. The PSV at the distal left renal artery measures 99.2 cm/s  with a velocity ratio compared to the abdominal aorta of 1.46 and  resistive index of 0.60. The bladder appears unremarkable.       Impression:       1. Normal morphological appearance of both kidneys.  2. Limited study with obscuration of the renal artery origins as well as  the mid left renal artery, no Doppler evidence for significant renal  artery stenosis is seen however. If more detailed evaluation is  indicated clinically, CTA would be the best study.  This report was finalized on 07/12/2017 20:13 by Dr. Marvin Mar MD.    US Renal Bilateral [[id number]] Collected:  07/12/17 2008     Updated:  07/13/17 1645    Narrative:       EXAMINATION: US RENAL ARTERY COMPLETE- 7/12/2017 8:08 PM CDT     HISTORY: Uncontrolled hypertension.     REPORT: Sonographic images of the kidneys were obtained, examination  includes Doppler analysis of the renal  arteries, using color, gray scale  and spectral Doppler techniques were duplex study. There are no  comparison studies.     The right kidney measures 10.3 x 5.5 x 4.9 cm and has normal morphology  and echogenicity. There is no hydronephrosis. The tail flow velocities  are provided on the ultrasound worksheet, however the origin the right  renal artery is obscured. The mid right renal artery, the peak systolic  velocity measures 116.8 cm/s, this corresponds with the velocity ratio  compared to the abdominal aorta of 1.72. The resistive index measures  0.55. Velocities in the distal right renal arteries are normal.     Left kidney measures 11.5 x 6.7 x 5.3 cm and has normal morphology and  echogenicity, there is no hydronephrosis on the left. Doppler images are  limited for the left kidney, both the origin and left mid renal artery  are obscured. The PSV at the distal left renal artery measures 99.2 cm/s  with a velocity ratio compared to the abdominal aorta of 1.46 and  resistive index of 0.60. The bladder appears unremarkable.       Impression:       1. Normal morphological appearance of both kidneys.  2. Limited study with obscuration of the renal artery origins as well as  the mid left renal artery, no Doppler evidence for significant renal  artery stenosis is seen however. If more detailed evaluation is  indicated clinically, CTA would be the best study.  This report was finalized on 07/12/2017 20:13 by Dr. Marvin Mar MD.    XR Chest 1 View [[id number]] Collected:  07/14/17 0721     Updated:  07/14/17 0725    Narrative:       Frontal supine radiograph of the chest 7/14/2017 2:10 CST     History: respiratory failure, patient intubated.; R13.12-Dysphagia,  oropharyngeal phase; R56.9-Unspecified convulsions;  T78.3XXS-Angioneurotic edema, sequela; F10.231-Alcohol dependence with  withdrawal delirium; Z74.09-Other reduced mobility     Comparison: Chest x-ray dated 07/13/2017      Findings:   Right PICC in place  terminating near the atrial caval junction. Left  subclavian catheter has been removed. Remaining lines and tubes are  stable in position. No new opacities or pneumothoraces are visualized in  the chest. The cardiomediastinal silhouette and pulmonary vascularity  are unchanged.       No acute osseous or soft tissue abnormality is noted.        Impression:       Impression:    No significant interval change since previous exam.        This report was finalized on 07/14/2017 07:22 by Dr. Martita Finnegan MD.    XR Abdomen KUB [[id number]] Collected:  07/14/17 1254     Updated:  07/14/17 1257    Narrative:       EXAMINATION:   XR ABDOMEN KUB-  7/14/2017 12:54 PM CDT     HISTORY: NG tube placement     Single view the abdomen is obtained. Nasogastric tube is present  satisfactorily position in the fundus the stomach.       Impression:       Satisfactory placement nasogastric tube  This report was finalized on 07/14/2017 12:54 by Dr. Adolph Echols MD.    XR Chest 1 View [[id number]] Collected:  07/15/17 1012     Updated:  07/15/17 1017    Narrative:       HISTORY: Respiratory failure, intubated     CXR: A frontal view the chest is obtained.     Comparison: 07/14/2017     Findings: Tracheostomy tube is appropriate in position. The prior  enteric tube has been removed. A new right upper extremity PICC line is  in place with the tip near the atrial caval junction.     There is a diminished level of inspiration. There is mild elevation of  the right hemidiaphragm. There are left lower lobe opacities. There is a  questionable small left pleural effusion. There is no pneumothorax. The  cardiomediastinal contours are similar       Impression:       1. Removal of the enteric tube with placement of a right upper extremity  PICC line. Tracheostomy tube appropriate in position.  2. Left lower lobe opacities may be patchy atelectasis. Consolidation  considered.      This report was finalized on 07/15/2017 10:14 by Dr. Sri Rivera  MD.    US Venous Doppler Lower Extremity Bilateral (duplex) [[id number]] Collected:  07/16/17 1022     Updated:  07/16/17 1025    Narrative:       DUPLEX ULTRASOUND OF THE LOWER EXTREMITIES 7/16/2017 8:44 CST     HISTORY: BLE edema & pain; R13.12-Dysphagia, oropharyngeal phase;  R56.9-Unspecified convulsions; T78.3XXS-Angioneurotic edema, sequela;  F10.231-Alcohol dependence with withdrawal delirium; Z74.09-Other  reduced mobility     COMPARISON: NONE     FINDINGS:  Transverse and longitudinal grayscale and Doppler sonographic images of  bilateral lower extremities were obtained.     There is normal flow and compressibility of bilateral common femoral,  superficial femoral, popliteal, peroneal, anterior tibial, and posterior  tibial veins.       Impression:       1. Normal duplex ultrasound of bilateral lower extremities without  evidence of DVT.     This report was finalized on 07/16/2017 10:22 by Dr. Sri Rivera MD.    FL Video Swallow With Speech [[id number]] Collected:  07/17/17 1339     Updated:  07/17/17 1343    Narrative:       EXAMINATION: FL VIDEO SWALLOW W SPEECH-  7/17/2017 2:39 PM EDT     HISTORY: Dysphagia     COMPARISON:None     TECHNIQUE:     Image number: 1     Fluoroscopy time: 1.2 minutes     FINDINGS:     The oral and pharyngeal phase of swallowing was evaluated with multiple  barium consistencies.     Patient tolerated all consistencies well without laryngeal penetration  or aspiration.       Impression:       1. Negative dysphagia study.  This report was finalized on 07/17/2017 13:40 by Dr. Pierre Pan MD.        Lab Results (all)     Procedure Component Value Units Date/Time    CBC & Differential [[id number]] Collected:  07/03/17 0807    Specimen:  Blood Updated:  07/03/17 0832    Narrative:       The following orders were created for panel order CBC & Differential.  Procedure                               Abnormality         Status                     ---------                                -----------         ------                     CBC Auto Differential[[id number]]        Abnormal            Final result                 Please view results for these tests on the individual orders.    CBC Auto Differential [[id number]]  (Abnormal) Collected:  07/03/17 0807    Specimen:  Blood Updated:  07/03/17 0832     WBC 11.95 (H) 10*3/mm3      RBC 3.93 (L) 10*6/mm3      Hemoglobin 11.9 (L) g/dL      Hematocrit 34.1 (L) %      MCV 86.8 fL      MCH 30.3 pg      MCHC 34.9 g/dL      RDW 14.0 %      RDW-SD 44.0 fl      MPV 9.6 fL      Platelets 245 10*3/mm3      Neutrophil % 82.0 (H) %      Lymphocyte % 8.3 (L) %      Monocyte % 8.9 %      Eosinophil % 0.3 %      Basophil % 0.2 %      Immature Grans % 0.3 %      Neutrophils, Absolute 9.81 (H) 10*3/mm3      Lymphocytes, Absolute 0.99 10*3/mm3      Monocytes, Absolute 1.06 10*3/mm3      Eosinophils, Absolute 0.03 10*3/mm3      Basophils, Absolute 0.02 10*3/mm3      Immature Grans, Absolute 0.04 (H) 10*3/mm3     Comprehensive Metabolic Panel [[id number]]  (Abnormal) Collected:  07/03/17 0807    Specimen:  Blood Updated:  07/03/17 0841     Glucose 127 (H) mg/dL      BUN 9 mg/dL      Creatinine 1.19 mg/dL      Sodium 141 mmol/L      Potassium 3.0 (L) mmol/L      Chloride 102 mmol/L      CO2 29.0 mmol/L      Calcium 8.8 mg/dL      Total Protein 6.9 g/dL      Albumin 4.00 g/dL      ALT (SGPT) 39 U/L      AST (SGOT) 72 (H) U/L      Alkaline Phosphatase 69 U/L      Total Bilirubin 0.6 mg/dL      eGFR Non African Amer 64 mL/min/1.73      Globulin 2.9 gm/dL      A/G Ratio 1.4 g/dL      BUN/Creatinine Ratio 7.6     Anion Gap 10.0 mmol/L     Urinalysis With / Culture If Indicated [[id number]]  (Abnormal) Collected:  07/03/17 0834    Specimen:  Urine from Urine, Clean Catch Updated:  07/03/17 0858     Color, UA Yellow     Appearance, UA Clear     pH, UA 7.5     Specific Gravity, UA 1.014     Glucose, UA Negative     Ketones, UA Negative     Bilirubin, UA Negative     Blood, UA Small  (1+) (A)     Protein, UA 30 mg/dL (1+) (A)     Leuk Esterase, UA Negative     Nitrite, UA Negative     Urobilinogen, UA 0.2 E.U./dL    Urinalysis, Microscopic Only [[id number]]  (Abnormal) Collected:  07/03/17 0834    Specimen:  Urine from Urine, Clean Catch Updated:  07/03/17 0858     RBC, UA 3-5 (A) /HPF      WBC, UA 0-2 (A) /HPF      Bacteria, UA None Seen /HPF      Squamous Epithelial Cells, UA 0-2 /HPF      Hyaline Casts, UA None Seen /LPF      Methodology Automated Microscopy    Urine Drug Screen [[id number]]  (Normal) Collected:  07/03/17 0834    Specimen:  Urine from Urine, Clean Catch Updated:  07/03/17 0930     Amphetamine Screen, Urine Negative     Barbiturates Screen, Urine Negative     Benzodiazepine Screen, Urine Negative     Cocaine Screen, Urine Negative     Methadone Screen, Urine Negative     Opiate Screen Negative     Phencyclidine (PCP), Urine Negative     THC, Screen, Urine Negative    Narrative:       Negative Thresholds For Drugs Screened in Urine:    Amphetamines          500 ng/ml  Barbiturates          200 ng/ml  Benzodiazepines       200 ng/ml  Cocaine               150 ng/ml  Methadone             150 ng/ml  Opiates               300 ng/ml  Phencyclidine         25 ng/ml  THC                      50 ng/ml    The normal value for all drugs tested is negative. This report includes final unconfirmed screening results.  A positive result by this assay can be, at your request, sent to the Reference [facility] for confirmation by gas chromatography. Unconfirmed results must not be used for non-medical purposes, such as employment or legal testing. Clinical consideration should be applied to any drug of abuse test result, particularly when unconfirmed results are used.    Basic Metabolic Panel [[id number]]  (Abnormal) Collected:  07/04/17 0937    Specimen:  Blood Updated:  07/04/17 0957     Glucose 126 (H) mg/dL      BUN 12 mg/dL      Creatinine 1.13 mg/dL      Sodium 142 mmol/L      Potassium 3.8 mmol/L       Chloride 105 mmol/L      CO2 26.0 mmol/L      Calcium 9.1 mg/dL      eGFR Non African Amer 68 mL/min/1.73      BUN/Creatinine Ratio 10.6     Anion Gap 11.0 mmol/L     Narrative:       GFR Normal >60  Chronic Kidney Disease <60  Kidney Failure <15    Magnesium [[id number]]  (Normal) Collected:  07/04/17 0937    Specimen:  Blood Updated:  07/04/17 0957     Magnesium 2.0 mg/dL     Blood Gas, Arterial [[id number]]  (Abnormal) Collected:  07/04/17 1635    Specimen:  Arterial Blood Updated:  07/04/17 1640     Site Arterial: left brachial     Gera's Test --      Documented in Rapid Comm        pH, Arterial 7.408 pH units      pCO2, Arterial 44.1 mm Hg      pO2, Arterial 235.2 (H) mm Hg      HCO3, Arterial 27.2 (H) mmol/L      Base Excess, Arterial 2.1 (H) mmol/L      O2 Saturation, Arterial 99.5 %      O2 Saturation Calculated 99.5 %      Barometric Pressure for Blood Gas -- mmHg       Component not reported at this site.        Modality Ventilator     FIO2 60 %      Ventilator Mode AC     Rate 10.0 Breaths/minute      PEEP 5.0     Vent CPAP/PEEP 5.0    Narrative:       Serial Number: [id number]    : [id number]    CBC (No Diff) [[id number]]  (Abnormal) Collected:  07/05/17 0158    Specimen:  Blood Updated:  07/05/17 0213     WBC 15.95 (H) 10*3/mm3      RBC 4.19 (L) 10*6/mm3      Hemoglobin 12.8 (L) g/dL      Hematocrit 38.1 (L) %      MCV 90.9 fL      MCH 30.5 pg      MCHC 33.6 g/dL      RDW 14.7 %      RDW-SD 49.1 fl      MPV 9.6 fL      Platelets 246 10*3/mm3     Blood Gas, Arterial [[id number]]  (Abnormal) Collected:  07/05/17 0224    Specimen:  Arterial Blood Updated:  07/05/17 0227     Site Arterial: right radial     Gera's Test --      Documented in Rapid Comm        pH, Arterial 7.408 pH units      pCO2, Arterial 40.7 mm Hg      pO2, Arterial 101.5 (H) mm Hg      HCO3, Arterial 25.1 mmol/L      Base Excess, Arterial 0.4 mmol/L      O2 Saturation, Arterial 97.7 %      O2 Saturation Calculated 97.7 %      Barometric  Pressure for Blood Gas -- mmHg       Component not reported at this site.        Modality Ventilator     FIO2 50 %      Ventilator Mode Assist     Rate 10.0 Breaths/minute      PEEP 5.0     Vent CPAP/PEEP 5.0    Narrative:       Serial Number: [id number]    : [id number]    Basic Metabolic Panel [[id number]]  (Normal) Collected:  07/05/17 0158    Specimen:  Blood Updated:  07/05/17 0229     Glucose 96 mg/dL      BUN 16 mg/dL      Creatinine 1.10 mg/dL      Sodium 143 mmol/L      Potassium 3.7 mmol/L      Chloride 105 mmol/L      CO2 28.0 mmol/L      Calcium 9.1 mg/dL      eGFR Non African Amer 71 mL/min/1.73      BUN/Creatinine Ratio 14.5     Anion Gap 10.0 mmol/L     Narrative:       GFR Normal >60  Chronic Kidney Disease <60  Kidney Failure <15    Blood Gas, Arterial [[id number]]  (Abnormal) Collected:  07/05/17 0322    Specimen:  Arterial Blood Updated:  07/05/17 0326     Site Arterial: right radial     Gera's Test --      Documented in Rapid Comm        pH, Arterial 7.414 pH units      pCO2, Arterial 43.0 mm Hg      pO2, Arterial 86.5 mm Hg      HCO3, Arterial 26.9 (H) mmol/L      Base Excess, Arterial 2.0 mmol/L      O2 Saturation, Arterial 96.6 %      O2 Saturation Calculated 96.6 %      Barometric Pressure for Blood Gas -- mmHg       Component not reported at this site.        Modality Ventilator     FIO2 30 %      Ventilator Mode Assist     Rate 10.0 Breaths/minute      PEEP 5.0     Vent CPAP/PEEP 5.0    Narrative:       Serial Number: [id number]    : [id number]    CBC (No Diff) [[id number]]  (Abnormal) Collected:  07/06/17 0222    Specimen:  Blood Updated:  07/06/17 0256     WBC 9.27 10*3/mm3      RBC 3.94 (L) 10*6/mm3      Hemoglobin 12.1 (L) g/dL      Hematocrit 35.4 (L) %      MCV 89.8 fL      MCH 30.7 pg      MCHC 34.2 g/dL      RDW 14.7 %      RDW-SD 48.2 fl      MPV 9.7 fL      Platelets 216 10*3/mm3     Basic Metabolic Panel [[id number]]  (Abnormal) Collected:  07/06/17 0222    Specimen:  Blood Updated:   07/06/17 0257     Glucose 87 mg/dL      BUN 15 mg/dL      Creatinine 0.98 mg/dL      Sodium 142 mmol/L      Potassium 3.0 (L) mmol/L      Chloride 108 mmol/L      CO2 27.0 mmol/L      Calcium 8.4 mg/dL      eGFR Non African Amer 81 mL/min/1.73      BUN/Creatinine Ratio 15.3     Anion Gap 7.0 mmol/L     Narrative:       GFR Normal >60  Chronic Kidney Disease <60  Kidney Failure <15    Magnesium [[id number]]  (Normal) Collected:  07/06/17 0222    Specimen:  Blood Updated:  07/06/17 0257     Magnesium 2.1 mg/dL     Blood Gas, Arterial [[id number]] Collected:  07/06/17 0356    Specimen:  Arterial Blood Updated:  07/06/17 0401     Site Arterial: right radial     Gera's Test --      Documented in Rapid Comm        pH, Arterial 7.430 pH units      pCO2, Arterial 35.9 mm Hg      pO2, Arterial 95.6 mm Hg      HCO3, Arterial 23.3 mmol/L      Base Excess, Arterial -0.5 mmol/L      O2 Saturation, Arterial 97.5 %      O2 Saturation Calculated 97.5 %      Barometric Pressure for Blood Gas -- mmHg       Component not reported at this site.        Modality Ventilator     FIO2 30 %      Ventilator Mode AC     Rate 10.0 Breaths/minute      PEEP 5.0     Vent CPAP/PEEP 5.0    Narrative:       Serial Number: [id number]    : [id number]    POC Glucose Fingerstick [[id number]]  (Normal) Collected:  07/06/17 0738    Specimen:  Blood Updated:  07/06/17 0750     Glucose 104 mg/dL       : [id number] Azalia Mcfarlane ID: [id number]       Gastrointestinal Panel, PCR [[id number]]  (Abnormal) Collected:  07/06/17 2327    Specimen:  Stool from Per Rectum Updated:  07/07/17 0124     Campylobacter Not Detected     Clostridium difficile (toxin A/B) Detected (A)        Due to the high asymptomatic carriage rates, especially in young children, the clinical relevance of the detection of toxigenic C. difficile from stool should be considered in the context of other clinical findings, patient age, and risk factors including hospitalization and  antibiotic exposure.        Plesiomonas shigelloides Not Detected     Salmonella Not Detected     Vibrio Not Detected     Vibrio cholerae Not Detected     Yersinia enterocolitica Not Detected     Enteroaggregative E. coli (EAEC) Not Detected     Enteropathogenic E. coli (EPEC) Not Detected     Enterotoxigenic E. coli (ETEC) lt/st Not Detected     Shiga-like toxin-producing E. coli (STEC) stx1/stx2 Not Detected     E. coli O157 Not Detected     Shigella/Enteroinvasive E. coli (EIEC) Not Detected     Cryptosporidium Not Detected     Cyclospora cayetanensis Not Detected     Entamoeba histolytica Not Detected     Giardia lamblia Not Detected     Adenovirus F40/41 Not Detected     Astrovirus Not Detected     Norovirus GI/GII Not Detected     Rotavirus A Not Detected     Sapovirus (I, II, IV or V) Not Detected    Basic Metabolic Panel [[id number]]  (Abnormal) Collected:  07/07/17 0204    Specimen:  Blood Updated:  07/07/17 0317     Glucose 119 (H) mg/dL      BUN 14 mg/dL      Creatinine 1.05 mg/dL      Sodium 142 mmol/L      Potassium 3.2 (L) mmol/L      Chloride 111 (H) mmol/L      CO2 23.0 (L) mmol/L      Calcium 8.0 (L) mg/dL      eGFR Non African Amer 74 mL/min/1.73      BUN/Creatinine Ratio 13.3     Anion Gap 8.0 mmol/L     Narrative:       GFR Normal >60  Chronic Kidney Disease <60  Kidney Failure <15    Blood Gas, Arterial [[id number]]  (Abnormal) Collected:  07/07/17 0339    Specimen:  Arterial Blood Updated:  07/07/17 0343     Site Arterial: right radial     Gera's Test --      Documented in Rapid Comm        pH, Arterial 7.434 pH units      pCO2, Arterial 31.5 (L) mm Hg      pO2, Arterial 76.4 (L) mm Hg      HCO3, Arterial 20.6 (L) mmol/L      Base Excess, Arterial -2.5 (L) mmol/L      O2 Saturation, Arterial 95.8 %      O2 Saturation Calculated 95.8 %      Barometric Pressure for Blood Gas -- mmHg       Component not reported at this site.        Modality Ventilator     FIO2 30 %      Ventilator Mode AC     Rate  10.0 Breaths/minute      PEEP 5.0     Vent CPAP/PEEP 5.0    Narrative:       Serial Number: [id number]    : [id number]    Clostridium Difficile Toxin, PCR [[id number]]  (Abnormal) Collected:  07/06/17 1844    Specimen:  Stool from Per Rectum Updated:  07/07/17 1348     C. Difficile Toxins by PCR Positive (A)    Blood Gas, Arterial [[id number]]  (Abnormal) Collected:  07/08/17 0214    Specimen:  Arterial Blood Updated:  07/08/17 0217     Site Arterial: right radial     Gera's Test --      Documented in Rapid Comm        pH, Arterial 7.422 pH units      pCO2, Arterial 35.8 mm Hg      pO2, Arterial 71.1 (L) mm Hg      HCO3, Arterial 22.8 mmol/L      Base Excess, Arterial -1.1 mmol/L      O2 Saturation, Arterial 94.7 %      O2 Saturation Calculated 94.7 %      Barometric Pressure for Blood Gas -- mmHg       Component not reported at this site.        Modality Ventilator     FIO2 30 %      Ventilator Mode Assist     Rate 10.0 Breaths/minute      PEEP 5.0     Vent CPAP/PEEP 5.0    Narrative:       Serial Number: [id number]    : [id number]    Basic Metabolic Panel [[id number]]  (Abnormal) Collected:  07/08/17 0411    Specimen:  Blood Updated:  07/08/17 0451     Glucose 131 (H) mg/dL      BUN 11 mg/dL      Creatinine 0.98 mg/dL      Sodium 143 mmol/L      Potassium 3.5 mmol/L      Chloride 111 (H) mmol/L      CO2 21.0 (L) mmol/L      Calcium 8.0 (L) mg/dL      eGFR Non African Amer 81 mL/min/1.73      BUN/Creatinine Ratio 11.2     Anion Gap 11.0 mmol/L     Narrative:       GFR Normal >60  Chronic Kidney Disease <60  Kidney Failure <15    CBC & Differential [[id number]] Collected:  07/08/17 1057    Specimen:  Blood Updated:  07/08/17 1118    Narrative:       The following orders were created for panel order CBC & Differential.  Procedure                               Abnormality         Status                     ---------                               -----------         ------                     CBC Auto  Differential[[id number]]        Abnormal            Final result                 Please view results for these tests on the individual orders.    CBC Auto Differential [[id number]]  (Abnormal) Collected:  07/08/17 1057    Specimen:  Blood from Arm, Right Updated:  07/08/17 1118     WBC 12.30 (H) 10*3/mm3      RBC 3.49 (L) 10*6/mm3      Hemoglobin 10.4 (L) g/dL      Hematocrit 31.5 (L) %      MCV 90.3 fL      MCH 29.8 pg      MCHC 33.0 g/dL      RDW 14.6 %      RDW-SD 48.3 fl      MPV 9.8 fL      Platelets 199 10*3/mm3      Neutrophil % 71.1 %      Lymphocyte % 11.7 (L) %      Monocyte % 13.4 (H) %      Eosinophil % 2.6 %      Basophil % 0.2 %      Immature Grans % 1.0 %      Neutrophils, Absolute 8.75 (H) 10*3/mm3      Lymphocytes, Absolute 1.44 10*3/mm3      Monocytes, Absolute 1.65 (H) 10*3/mm3      Eosinophils, Absolute 0.32 10*3/mm3      Basophils, Absolute 0.02 10*3/mm3      Immature Grans, Absolute 0.12 (H) 10*3/mm3     Blood Gas, Arterial [[id number]] Collected:  07/09/17 0159    Specimen:  Arterial Blood Updated:  07/09/17 0203     Site Arterial: right radial     Gera's Test --      Documented in Rapid Comm        pH, Arterial 7.417 pH units      pCO2, Arterial 36.3 mm Hg      pO2, Arterial 80.8 mm Hg      HCO3, Arterial 22.9 mmol/L      Base Excess, Arterial -1.1 mmol/L      O2 Saturation, Arterial 96.2 %      O2 Saturation Calculated 96.2 %      Barometric Pressure for Blood Gas -- mmHg       Component not reported at this site.        Modality Ventilator     FIO2 30 %      Ventilator Mode Assist     Rate 10.0 Breaths/minute      PEEP 5.0     Vent CPAP/PEEP 5.0    Narrative:       Serial Number: [id number]    : [id number]    Basic Metabolic Panel [[id number]]  (Abnormal) Collected:  07/09/17 0348    Specimen:  Blood Updated:  07/09/17 0406     Glucose 108 (H) mg/dL      BUN 11 mg/dL      Creatinine 0.91 mg/dL      Sodium 143 mmol/L      Potassium 3.7 mmol/L      Chloride 111 (H) mmol/L      CO2 22.0 (L) mmol/L       Calcium 8.0 (L) mg/dL      eGFR Non African Amer 88 mL/min/1.73      BUN/Creatinine Ratio 12.1     Anion Gap 10.0 mmol/L     Narrative:       GFR Normal >60  Chronic Kidney Disease <60  Kidney Failure <15    Urinalysis With / Culture If Indicated [[id number]]  (Abnormal) Collected:  07/09/17 1100    Specimen:  Urine from Urine, Clean Catch Updated:  07/09/17 1109     Color, UA Dark Yellow (A)     Appearance, UA Clear     pH, UA 5.5     Specific Gravity, UA 1.022     Glucose, UA Negative     Ketones, UA Negative     Bilirubin, UA Negative     Blood, UA Negative     Protein, UA Negative     Leuk Esterase, UA Negative     Nitrite, UA Negative     Urobilinogen, UA 1.0 E.U./dL    Narrative:       Urine microscopic not indicated.    Blood Gas, Arterial [[id number]]  (Abnormal) Collected:  07/10/17 0255    Specimen:  Arterial Blood Updated:  07/10/17 0259     Site Arterial: right radial     Gera's Test --      Documented in Rapid Comm        pH, Arterial 7.443 pH units      pCO2, Arterial 32.9 (L) mm Hg      pO2, Arterial 55.9 (L) mm Hg      HCO3, Arterial 22.0 mmol/L      Base Excess, Arterial -1.2 mmol/L      O2 Saturation, Arterial 90.6 (L) %      O2 Saturation Calculated 90.6 (L) %      Barometric Pressure for Blood Gas -- mmHg       Component not reported at this site.        Modality Ventilator     FIO2 30 %      Rate 10.0 Breaths/minute      PEEP 5.0     Vent CPAP/PEEP 5.0    Narrative:       Serial Number: [id number]    : [id number]    Comprehensive Metabolic Panel [[id number]]  (Abnormal) Collected:  07/10/17 0338    Specimen:  Blood Updated:  07/10/17 0404     Glucose 182 (H) mg/dL      BUN 13 mg/dL      Creatinine 0.98 mg/dL      Sodium 144 mmol/L      Potassium 4.1 mmol/L      Chloride 111 (H) mmol/L      CO2 24.0 mmol/L      Calcium 8.6 mg/dL      Total Protein 6.5 g/dL      Albumin 3.40 (L) g/dL      ALT (SGPT) 42 U/L      AST (SGOT) 51 (H) U/L      Alkaline Phosphatase 155 (H) U/L      Total Bilirubin 0.5  mg/dL      eGFR Non African Amer 81 mL/min/1.73      Globulin 3.1 gm/dL      A/G Ratio 1.1 g/dL      BUN/Creatinine Ratio 13.3     Anion Gap 9.0 mmol/L     Respiratory Culture [[id number]]  (Abnormal)  (Susceptibility) Collected:  07/08/17 0403    Specimen:  Sputum from NT Suction Updated:  07/10/17 0744     Respiratory Culture Heavy growth (4+) Staphylococcus aureus (A)     BETA LACTAMASE Positive     Gram Stain Result Greater than 25 WBCs per low power field      Many (4+) Mixed gram positive evelio    Narrative:       For Staphylococcus, penicillin-resistant, oxacillin-susceptible strains are resistant to B-lactamase labile penicillins but susceptible to other B-lactamase stable penicillins, B-lactamase inhibitor combinations, relavant cephems, and carbapenems.    Susceptibility      Staphylococcus aureus     [place]     Clindamycin <=0.25 ug/ml Susceptible     Erythromycin <=0.25 ug/ml Susceptible     Gentamicin <=0.5 ug/ml Susceptible     Inducible Clindamycin Resistance NEG  Negative     Levofloxacin <=0.12 ug/ml Susceptible  [1]      Oxacillin 0.5 ug/ml Susceptible     Penicillin G >=0.5 ug/ml Resistant     Tetracycline <=1 ug/ml Susceptible     Trimethoprim + Sulfamethoxazole <=10 ug/ml Susceptible     Vancomycin <=0.5 ug/ml Susceptible            [1]   Staphylococcus species may develop resistance during prolonged therapy with quinolones. Isolates that are initially susceptible may become resistant within three to four days after initiation of therapy. Testing of repeat isolates may be warranted.              Susceptibility Comments     Staphylococcus aureus      This isolate does not demonstrate inducible clindamycin resistance in vitro.               Vancomycin, Trough [[id number]]  (Normal) Collected:  07/10/17 0830    Specimen:  Blood Updated:  07/10/17 0906     Vancomycin Trough 12.99 mcg/mL     Basic Metabolic Panel [[id number]]  (Abnormal) Collected:  07/11/17 0142    Specimen:  Blood Updated:  07/11/17 0222      Glucose 143 (H) mg/dL      BUN 21 mg/dL      Creatinine 1.07 mg/dL      Sodium 147 (H) mmol/L      Potassium 3.7 mmol/L      Chloride 109 mmol/L      CO2 25.0 mmol/L      Calcium 8.9 mg/dL      eGFR Non African Amer 73 mL/min/1.73      BUN/Creatinine Ratio 19.6     Anion Gap 13.0 mmol/L     Narrative:       GFR Normal >60  Chronic Kidney Disease <60  Kidney Failure <15    Blood Gas, Arterial [[id number]]  (Abnormal) Collected:  07/11/17 0327    Specimen:  Arterial Blood Updated:  07/11/17 0330     Site Arterial: right radial     Gera's Test --      Documented in Rapid Comm        pH, Arterial 7.495 (H) pH units      pCO2, Arterial 31.1 (L) mm Hg      pO2, Arterial 76.1 (L) mm Hg      HCO3, Arterial 23.4 mmol/L      Base Excess, Arterial 1.1 mmol/L      O2 Saturation, Arterial 96.3 %      O2 Saturation Calculated 96.3 %      Barometric Pressure for Blood Gas -- mmHg       Component not reported at this site.        Modality Ventilator     FIO2 40 %      Ventilator Mode AC     Rate 10.0 Breaths/minute      PEEP 10.0     Vent CPAP/PEEP 10.0    Narrative:       Serial Number: [id number]    : [id number]    Blood Gas, Arterial [[id number]]  (Abnormal) Collected:  07/12/17 0310    Specimen:  Arterial Blood Updated:  07/12/17 0314     Site Arterial: right radial     Gera's Test --      Documented in Rapid Comm        pH, Arterial 7.498 (H) pH units      pCO2, Arterial 36.6 mm Hg      pO2, Arterial 76.8 (L) mm Hg      HCO3, Arterial 27.8 (H) mmol/L      Base Excess, Arterial 4.6 (H) mmol/L      O2 Saturation, Arterial 96.4 %      O2 Saturation Calculated 96.4 %      Barometric Pressure for Blood Gas -- mmHg       Component not reported at this site.        Modality Ventilator     FIO2 40 %      Ventilator Mode AC     Rate 10.0 Breaths/minute      PEEP 10.0     Vent CPAP/PEEP 10.0    Narrative:       Serial Number: [id number]    : [id number]    Basic Metabolic Panel [[id number]]  (Abnormal) Collected:  07/12/17 0226     Specimen:  Blood Updated:  07/12/17 0334     Glucose 147 (H) mg/dL      BUN 29 (H) mg/dL      Creatinine 1.06 mg/dL      Sodium 147 (H) mmol/L      Potassium 3.3 (L) mmol/L      Chloride 106 mmol/L      CO2 28.0 mmol/L      Calcium 9.0 mg/dL      eGFR Non African Amer 74 mL/min/1.73      BUN/Creatinine Ratio 27.4 (H)     Anion Gap 13.0 mmol/L     Narrative:       GFR Normal >60  Chronic Kidney Disease <60  Kidney Failure <15    CBC & Differential [[id number]] Collected:  07/12/17 0226    Specimen:  Blood Updated:  07/12/17 0416    Narrative:       The following orders were created for panel order CBC & Differential.  Procedure                               Abnormality         Status                     ---------                               -----------         ------                     Manual Differential[[id number]]          Abnormal            Final result               Scan Slide[[id number]]                                       Final result               CBC Auto Differential[[id number]]        Abnormal            Final result                 Please view results for these tests on the individual orders.    CBC Auto Differential [[id number]]  (Abnormal) Collected:  07/12/17 0226    Specimen:  Blood Updated:  07/12/17 0416     WBC 16.64 (H) 10*3/mm3      RBC 3.49 (L) 10*6/mm3      Hemoglobin 10.4 (L) g/dL      Hematocrit 31.3 (L) %      MCV 89.7 fL      MCH 29.8 pg      MCHC 33.2 g/dL      RDW 14.7 %      RDW-SD 47.8 fl      MPV 9.6 fL      Platelets 349 10*3/mm3     Scan Slide [[id number]] Collected:  07/12/17 0226    Specimen:  Blood Updated:  07/12/17 0416     Scan Slide --      See Manual Differential Results       Manual Differential [[id number]]  (Abnormal) Collected:  07/12/17 0226    Specimen:  Blood Updated:  07/12/17 0416     Neutrophil % 61.0 %      Lymphocyte % 23.0 %      Monocyte % 3.0 (L) %      Bands %  7.0 %      Metamyelocyte % 2.0 (H) %      Myelocyte % 3.0 (H) %      Atypical Lymphocyte % 1.0 %       Neutrophils Absolute 11.32 (H) 10*3/mm3      Lymphocytes Absolute 3.83 10*3/mm3      Monocytes Absolute 0.50 10*3/mm3      Hypochromia Slight/1+     WBC Morphology Normal     Platelet Morphology Normal    Blood Gas, Arterial [[id number]]  (Abnormal) Collected:  07/13/17 0317    Specimen:  Arterial Blood Updated:  07/13/17 0320     Site Arterial: right radial     Gera's Test --      Documented in Rapid Comm        pH, Arterial 7.441 pH units      pCO2, Arterial 38.8 mm Hg      pO2, Arterial 105.3 (H) mm Hg      HCO3, Arterial 25.8 mmol/L      Base Excess, Arterial 1.8 mmol/L      O2 Saturation, Arterial 98.0 %      O2 Saturation Calculated 98.0 %      Barometric Pressure for Blood Gas -- mmHg       Component not reported at this site.        Modality Ventilator     FIO2 40 %      Ventilator Mode AC     Rate 10.0 Breaths/minute      PEEP 10.0     Vent CPAP/PEEP 10.0    Narrative:       Serial Number: [id number]    : [id number]    Blood Gas, Arterial [[id number]]  (Abnormal) Collected:  07/14/17 0225    Specimen:  Arterial Blood Updated:  07/14/17 0229     Site Arterial: right radial     Gera's Test --      Documented in Rapid Comm        pH, Arterial 7.444 pH units      pCO2, Arterial 33.7 (L) mm Hg      pO2, Arterial 75.2 (L) mm Hg      HCO3, Arterial 22.6 mmol/L      Base Excess, Arterial -0.7 mmol/L      O2 Saturation, Arterial 95.7 %      O2 Saturation Calculated 95.7 %      Barometric Pressure for Blood Gas -- mmHg       Component not reported at this site.        Modality Ventilator     FIO2 40 %      Ventilator Mode Assist     Rate 10.0 Breaths/minute      PEEP 8.0     Vent CPAP/PEEP 8.0    Narrative:       Serial Number: [id number]    : [id number]    CBC & Differential [[id number]] Collected:  07/14/17 0257    Specimen:  Blood Updated:  07/14/17 0317    Narrative:       The following orders were created for panel order CBC & Differential.  Procedure                               Abnormality         Status                      ---------                               -----------         ------                     CBC Auto Differential[[id number]]        Abnormal            Final result                 Please view results for these tests on the individual orders.    CBC Auto Differential [[id number]]  (Abnormal) Collected:  07/14/17 0257    Specimen:  Blood Updated:  07/14/17 0317     WBC 16.14 (H) 10*3/mm3      RBC 3.64 (L) 10*6/mm3      Hemoglobin 10.9 (L) g/dL      Hematocrit 32.8 (L) %      MCV 90.1 fL      MCH 29.9 pg      MCHC 33.2 g/dL      RDW 14.5 %      RDW-SD 47.7 fl      MPV 9.8 fL      Platelets 400 10*3/mm3      Neutrophil % 68.5 %      Lymphocyte % 18.7 %      Monocyte % 8.0 %      Eosinophil % 0.6 %      Basophil % 0.2 %      Immature Grans % 4.0 %      Neutrophils, Absolute 11.04 (H) 10*3/mm3      Lymphocytes, Absolute 3.02 10*3/mm3      Monocytes, Absolute 1.29 10*3/mm3      Eosinophils, Absolute 0.10 10*3/mm3      Basophils, Absolute 0.04 10*3/mm3      Immature Grans, Absolute 0.65 (H) 10*3/mm3     Basic Metabolic Panel [[id number]]  (Abnormal) Collected:  07/14/17 0257    Specimen:  Blood Updated:  07/14/17 0337     Glucose 105 (H) mg/dL      BUN 24 (H) mg/dL      Creatinine 0.99 mg/dL      Sodium 147 (H) mmol/L      Potassium 2.9 (C) mmol/L      Chloride 111 (H) mmol/L      CO2 24.0 mmol/L      Calcium 9.1 mg/dL      eGFR Non African Amer 80 mL/min/1.73      BUN/Creatinine Ratio 24.2     Anion Gap 12.0 mmol/L     Narrative:       GFR Normal >60  Chronic Kidney Disease <60  Kidney Failure <15    CBC (No Diff) [[id number]]  (Abnormal) Collected:  07/15/17 0143    Specimen:  Blood Updated:  07/15/17 0217     WBC 16.11 (H) 10*3/mm3      RBC 3.76 (L) 10*6/mm3      Hemoglobin 11.2 (L) g/dL      Hematocrit 33.8 (L) %      MCV 89.9 fL      MCH 29.8 pg      MCHC 33.1 g/dL      RDW 14.4 %      RDW-SD 47.0 fl      MPV 9.8 fL      Platelets 366 10*3/mm3     Blood Gas, Arterial [[id number]] Collected:  07/15/17 0219     Specimen:  Arterial Blood Updated:  07/15/17 0223     Site Arterial: right radial     Gera's Test --      Documented in Rapid [facility]        pH, Arterial 7.435 pH units      pCO2, Arterial 35.4 mm Hg      pO2, Arterial 85.1 mm Hg      HCO3, Arterial 23.2 mmol/L      Base Excess, Arterial -0.4 mmol/L      O2 Saturation, Arterial 96.8 %      O2 Saturation Calculated 96.8 %      Barometric Pressure for Blood Gas -- mmHg       Component not reported at this site.        Modality Cool Aerosol     Flow Rate 35.00 lpm     Narrative:       Serial Number: [id number]    : [id number]    Basic Metabolic Panel [[id number]]  (Abnormal) Collected:  07/15/17 0143    Specimen:  Blood Updated:  07/15/17 0227     Glucose 93 mg/dL      BUN 18 mg/dL      Creatinine 0.90 mg/dL      Sodium 146 (H) mmol/L      Potassium 3.4 (L) mmol/L      Chloride 110 mmol/L      CO2 25.0 mmol/L      Calcium 8.8 mg/dL      eGFR Non African Amer 89 mL/min/1.73      BUN/Creatinine Ratio 20.0     Anion Gap 11.0 mmol/L     Narrative:       GFR Normal >60  Chronic Kidney Disease <60  Kidney Failure <15    Magnesium [[id number]]  (Normal) Collected:  07/15/17 0143    Specimen:  Blood Updated:  07/15/17 0227     Magnesium 2.1 mg/dL     CBC (No Diff) [[id number]]  (Abnormal) Collected:  07/16/17 0230    Specimen:  Blood Updated:  07/16/17 0253     WBC 19.16 (H) 10*3/mm3      RBC 3.84 (L) 10*6/mm3      Hemoglobin 11.3 (L) g/dL      Hematocrit 34.0 (L) %      MCV 88.5 fL      MCH 29.4 pg      MCHC 33.2 g/dL      RDW 14.4 %      RDW-SD 46.8 fl      MPV 9.8 fL      Platelets 357 10*3/mm3     Basic Metabolic Panel [[id number]]  (Abnormal) Collected:  07/16/17 0230    Specimen:  Blood Updated:  07/16/17 0305     Glucose 131 (H) mg/dL      BUN 18 mg/dL      Creatinine 0.95 mg/dL      Sodium 143 mmol/L      Potassium 3.6 mmol/L      Chloride 110 mmol/L      CO2 23.0 (L) mmol/L      Calcium 8.8 mg/dL      eGFR Non African Amer 84 mL/min/1.73      BUN/Creatinine Ratio 18.9      Anion Gap 10.0 mmol/L     Narrative:       GFR Normal >60  Chronic Kidney Disease <60  Kidney Failure <15    CBC (No Diff) [[id number]]  (Abnormal) Collected:  07/17/17 0343    Specimen:  Blood Updated:  07/17/17 0442     WBC 23.30 (H) 10*3/mm3      RBC 3.05 (L) 10*6/mm3      Hemoglobin 9.2 (L) g/dL      Hematocrit 27.4 (L) %      MCV 89.8 fL      MCH 30.2 pg      MCHC 33.6 g/dL      RDW 14.7 %      RDW-SD 48.4 fl      MPV 10.2 fL      Platelets 493 (H) 10*3/mm3     Basic Metabolic Panel [[id number]]  (Abnormal) Collected:  07/17/17 0343    Specimen:  Blood Updated:  07/17/17 0513     Glucose 86 mg/dL      BUN 17 mg/dL       Specimen hemolyzed. Results may be affected.        Creatinine 0.95 mg/dL      Sodium 142 mmol/L      Potassium 3.9 mmol/L       Specimen hemolyzed.  Results may be affected.        Chloride 109 mmol/L      CO2 22.0 (L) mmol/L      Calcium 9.3 mg/dL      eGFR Non African Amer 84 mL/min/1.73      BUN/Creatinine Ratio 17.9     Anion Gap 11.0 mmol/L     Narrative:       GFR Normal >60  Chronic Kidney Disease <60  Kidney Failure <15    CBC & Differential [[id number]] Collected:  07/18/17 0510    Specimen:  Blood Updated:  07/18/17 0551    Narrative:       The following orders were created for panel order CBC & Differential.  Procedure                               Abnormality         Status                     ---------                               -----------         ------                     CBC Auto Differential[[id number]]        Abnormal            Final result                 Please view results for these tests on the individual orders.    CBC Auto Differential [[id number]]  (Abnormal) Collected:  07/18/17 0510    Specimen:  Blood Updated:  07/18/17 0551     WBC 14.20 (H) 10*3/mm3      RBC 4.00 (L) 10*6/mm3      Hemoglobin 11.8 (L) g/dL      Hematocrit 36.0 (L) %      MCV 90.0 fL      MCH 29.5 pg      MCHC 32.8 (L) g/dL      RDW 14.4 %      RDW-SD 47.6 fl      MPV 10.4 fL      Platelets 413 (H)  10*3/mm3      Neutrophil % 73.2 %      Lymphocyte % 13.7 (L) %      Monocyte % 9.9 %      Eosinophil % 2.3 %      Basophil % 0.2 %      Immature Grans % 0.7 %      Neutrophils, Absolute 10.41 (H) 10*3/mm3      Lymphocytes, Absolute 1.94 10*3/mm3      Monocytes, Absolute 1.40 (H) 10*3/mm3      Eosinophils, Absolute 0.32 10*3/mm3      Basophils, Absolute 0.03 10*3/mm3      Immature Grans, Absolute 0.10 (H) 10*3/mm3     Basic Metabolic Panel [[id number]]  (Abnormal) Collected:  07/18/17 0510    Specimen:  Blood Updated:  07/18/17 0600     Glucose 85 mg/dL      BUN 22 (H) mg/dL      Creatinine 1.17 mg/dL      Sodium 144 mmol/L      Potassium 3.9 mmol/L      Chloride 108 mmol/L      CO2 22.0 (L) mmol/L      Calcium 9.7 mg/dL      eGFR Non African Amer 66 mL/min/1.73      BUN/Creatinine Ratio 18.8     Anion Gap 14.0 (H) mmol/L     Narrative:       GFR Normal >60  Chronic Kidney Disease <60  Kidney Failure <15    Uric Acid [[id number]]  (Normal) Collected:  07/18/17 0510    Specimen:  Blood Updated:  07/18/17 0751     Uric Acid 7.1 mg/dL     CBC & Differential [[id number]] Collected:  07/19/17 0550    Specimen:  Blood Updated:  07/19/17 0557    Narrative:       The following orders were created for panel order CBC & Differential.  Procedure                               Abnormality         Status                     ---------                               -----------         ------                     CBC Auto Differential[[id number]]        Abnormal            Final result                 Please view results for these tests on the individual orders.    CBC Auto Differential [[id number]]  (Abnormal) Collected:  07/19/17 0550    Specimen:  Blood Updated:  07/19/17 0557     WBC 8.50 10*3/mm3      RBC 3.55 (L) 10*6/mm3      Hemoglobin 10.3 (L) g/dL      Hematocrit 32.0 (L) %      MCV 90.1 fL      MCH 29.0 pg      MCHC 32.2 (L) g/dL      RDW 14.5 %      RDW-SD 47.8 fl      MPV 9.9 fL      Platelets 357 10*3/mm3      Neutrophil %  61.1 %      Lymphocyte % 19.8 %      Monocyte % 13.9 (H) %      Eosinophil % 4.0 %      Basophil % 0.4 %      Immature Grans % 0.8 %      Neutrophils, Absolute 5.20 10*3/mm3      Lymphocytes, Absolute 1.68 10*3/mm3      Monocytes, Absolute 1.18 10*3/mm3      Eosinophils, Absolute 0.34 10*3/mm3      Basophils, Absolute 0.03 10*3/mm3      Immature Grans, Absolute 0.07 (H) 10*3/mm3     Basic Metabolic Panel [[id number]]  (Abnormal) Collected:  07/19/17 0550    Specimen:  Blood Updated:  07/19/17 0618     Glucose 87 mg/dL      BUN 21 mg/dL      Creatinine 1.10 mg/dL      Sodium 144 mmol/L      Potassium 4.4 mmol/L      Chloride 112 (H) mmol/L      CO2 20.0 (L) mmol/L      Calcium 9.2 mg/dL      eGFR Non African Amer 71 mL/min/1.73      BUN/Creatinine Ratio 19.1     Anion Gap 12.0 mmol/L     Narrative:       GFR Normal >60  Chronic Kidney Disease <60  Kidney Failure <15        Hospital Course:  The patient is a 52 y.o. male who presented to [facility] with Complaints of a seizure and elevated blood pressure.  He also complained of his tongue have been swelling prior to the seizure activity and then additionally biting his tongue.  He was found to have posterior reversible encephalopathy syndrome on an MRI done in the emergency department.  He was admitted to the intensive care unit and neurology consulted as well.  We were initially very tactful about his blood pressure control.  We did not want him to strictly to because of his PRES and malignant hypertension.  Dr. Campos placed him on Keppra.  The patient on my first day with him, the day of admission, denied any significant alcohol intake.  He told me that he had a few beers on occasion.  However, by hospital day #2 the patient was tremulous and displaying signs of alcohol withdrawal.  His daughter did endorse that he indeed did drink significantly more alcohol than what he had admitted to.  He was placed on scheduled Valium and as needed Ativan.   Unfortunately, he became increasingly combative and agitated.  He developed delirium tremens.  Dr. Campos and I felt that we had no choice but to place him on ventilatory support to get him through his withdrawal period.     His intensive care course was complicated by possible ventilator associated pneumonia, Clostridium difficile colitis, and he eventually required tracheostomy placement to wean from ventilatory support.  He was fortunately able to do this and ultimately rebounded very nicely after coming off of ventilatory support late in the evening of July 14th. He is now on room air and doing very well with his tracheostomy. ENT hopefully can consider decannulation in the near future.  They will see him as an outpatient setting.  He has been capping his trach and also using a Passy-Ceci valve.    His blood pressure is much improved over last 48-72 hours. Still a few hypertensive numbers, but overall doing much better. Changed metoprolol to carvedilol on 7/14. Placed on losartan on 7/14 and will increase today. There was some question as to whether or not he had tongue swelling on lisinopril. However, angiotensin receptor blockers should be okay as he needs these for blood pressure control. Continue amlodipine, Change clonidine patch back to oral at this point.       He will remain on Keppra for at least 6-8 weeks per the neurology service.  I will decrease his dose to 500 mg twice per day for now and continue.  He will follow-up with neurology in 3 weeks and have an outpatient MRI.  Also instructed he and his family that he is not to drive for 90 days post his seizure      He had been on broad-spectrum antibiotics for potential pneumonia since the 11th. The Staph in his sputum was MSSA. Discontinued vancomycin on 7/14. Discontinued Zosyn 7/16. Levaquin discontinued on 7/18, received 7 days. He is also completing Flagyl Clostridium difficile toxin present in his stool. Today is day 13, will treat for 14. No  "recent loose stools. His white blood cell count has finally normalized today. No signs of ongoing infections and no fevers.      Placed on indomethacin and colchicine on 7/17 for gout.  He has done very well with this and at this point in time can start taking them on an as-needed basis.  He is going to discuss with primary care next week about when to reinitiate allopurinol.  His uric acid was 7.1.  He knows to not take allopurinol in the acute phase of acute gout flare.      Speech therapy cleared for regular foods with thin liquids after a dysphagia study on 7/17.  He did well with physical and occupational therapy.  At this point in time, he is ambulatory and able to conduct all of his own activities of daily living.  For safety purposes, he is going to be staying with his mother for at least the first future.    Condition on Discharge: Good.     Physical Exam on Discharge:  /86  Pulse 96  Temp 97.2 °F (36.2 °C)  Resp 20  Ht 67\" (170.2 cm)  Wt 215 lb 8 oz (97.8 kg)  SpO2 99%  BMI 33.75 kg/m2  Physical Exam  Constitutional: No distress.   Seen and discussed with his nurse, Elin. His sister and mother are present. He is calm, polite, conversant. PICC has been removed.    Head: Normocephalic and atraumatic.   Eyes: Pupils are equal, round, and reactive to light.   Neck: Neck supple. No JVD present. Trach clean and with PMV.   Cardiovascular: Normal rate and regular rhythm.   Pulmonary/Chest: Effort normal and breath sounds normal. On room air via trach.    Abdominal: Soft. Bowel sounds are normal.   Musculoskeletal: He exhibits edema (trace).  Improved calor and erythema of the left great toe at the MTP joint.   Neurological: He is appropriate. Good strength. Ambulatory.   Skin: Skin is warm and dry.     Discharge Disposition:  Home to his mother's house.     Discharge Medications:   Nishant Savage   Home Medication Instructions EMMETT:[id number]    Printed on:07/19/17 1019   Medication Information    "                   amLODIPine (NORVASC) 10 MG tablet  Take 1 tablet by mouth Daily.             carvedilol (COREG) 25 MG tablet  Take 1 tablet by mouth Every 12 (Twelve) Hours.             cloNIDine (CATAPRES) 0.1 MG tablet  Take 0.1 mg by mouth every night.             colchicine 0.6 MG tablet  Take 1 tablet by mouth Every 12 (Twelve) Hours As Needed for Muscle / Joint Pain.             fenofibrate (TRICOR) 145 MG tablet  Take 1 tablet by mouth Every Night.             HYDROcodone-acetaminophen (NORCO) 5-325 MG per tablet  Take 1 tablet by mouth Every 4 (Four) Hours As Needed for Moderate Pain  for up to 3 days.             indomethacin (INDOCIN) 50 MG capsule  Take 1 capsule by mouth 2 (Two) Times a Day As Needed for Mild Pain . Do not take for greater then 3-5 days.             levETIRAcetam (KEPPRA) 500 MG tablet  Take 1 tablet by mouth Every 12 (Twelve) Hours.             losartan (COZAAR) 100 MG tablet  Take 1 tablet by mouth Daily.             metroNIDAZOLE (FLAGYL) 500 MG tablet  Take 1 tablet by mouth Every 8 (Eight) Hours. Indications: Infection Within the Abdomen             vardenafil (LEVITRA) 10 MG tablet  Take 1 tablet by mouth Daily As Needed for erectile dysfunction.               Discharge Diet:   Diet Instructions     Diet: Regular; Thin Liquids, No Restrictions       Discharge Diet:  Regular   Fluid Consistency:  Thin Liquids, No Restrictions               Activity at Discharge:   Activity Instructions     Activity as Tolerated           Other Instructions (Specify)       No driving for 90 days.               Follow-up Appointments:   1.  EPI [facility] in 1 week.  2.  Dr. Pierson to be determined.   3.  EPI Pardo in 3 weeks.    Test Results Pending at Discharge: None.    Jairon Perez DO  07/19/17  10:19 AM    Time: 40 minutes.            Electronically signed by Jairon Perez DO at 7/19/2017 10:35 AM

## 2017-07-19 NOTE — PAYOR COMM NOTE
"Nishant Wahl (52 y.o. Male)     Date of Birth Social Security Number Address Home Phone MRN    1965  [id number]  [place] [address] [phone] [id number]    Anglican Marital Status          [facility] Single       Admission Date Admission Type Admitting Provider Attending Provider Department, Room/Bed    7/3/17 Emergency Jairon Perez DO Hancock, John C, DO [facility] 4C, 479/1    Discharge Date Discharge Disposition Discharge Destination         Home or Self Care             Attending Provider: Jairon Perez DO     Allergies:  Lipitor [Atorvastatin]    Isolation:  Spore   Infection:  C.difficile (07/07/17)   Code Status:  FULL    Ht:  67\" (170.2 cm)   Wt:  215 lb 8 oz (97.8 kg)    Admission Cmt:  None   Principal Problem:  None                Active Insurance as of 7/3/2017     Primary Coverage     Payor Plan Insurance Group Employer/Plan Group    [facility]      Payor Plan Address Payor Plan Phone Number Effective From Effective To    [address] [address]  7/3/2017     [place] [address]       Subscriber Name Subscriber Birth Date Member ID       NISHANT WAHL 1965 [id number]                 Emergency Contacts      (Rel.) Home Phone Work Phone Mobile Phone    Yvette Pollack [phone] -- [phone]                                                            Discharge Summaries       Date of Service: 7/19/2017 10:18 AM           Jairon Perez DO    Medicine                                  Hide copied text                                                                                                                                                         [facility] Medicine Services    DISCHARGE SUMMARY               Date of Admission: 7/3/2017    Date of Discharge:  7/19/2017    Primary Care Physician: Linda Hoffman, DNP, APRN         Presenting Problem/History of " Present Illness:    Tongue swelling which preceded possible seizure activity.          Final Discharge Diagnoses:    1. Possible angioedema related to lisinopril prior to presentation and tongue bite.    2. Malignant hypertension at presentation and still difficult to control.     3. Possible new onset seizure disorder versus convulsive syncope.    4. Posterior reversible encephalopathy syndrome on MRI.    5. Tongue bite with sublingual hematoma, much improved.    6. Tobacco abuse.    7. Obstructive sleep apnea, noncompliant with device.    8. Daily alcohol use with recent Delirium tremens requiring sedation and mechanical ventilation.    9. Hypokalemia, replaced.     10. Clostridium difficile colitis.    11. Possible lingual or sublingual infection, MSSA on culture.    12. Bilateral lower lobe infiltrates consistent with ventilator associated pneumonia combined with atelectasis.    13. Acute gouty arthritis of the left great toe.         Consults:     1. Dr. Campos and Dr. Arroyo with neurology.     2. Dr. Gallo and Dr. Petersen with pulmonary.     3. Dr. Pierson with ENT.     4. Dr. Minna Tran with general surgery.          Procedures Performed: Tracheostomy with Dr. Pierson. Central line placement by Dr. Minna Tran.           Pertinent Test Results:       Imaging Results (all)               Procedure     Component     Value     Units     Date/Time               XR Shoulder 2+ View Right [[id number]]     Collected:  07/03/17 0913                   Updated:  07/03/17 0918             Narrative:                EXAMINATION: XR SHOULDER 2+ VW RIGHT-          7/3/2017 10:02 AM EDT          HISTORY: Right shoulder pain.          Right shoulder, 3 views.          Old healed fracture of the right clavicle at the junction of the mid and    distal one third.    Bony hypertrophy of the distal clavicle with narrowing of the outlet and    increased risk for impingement.    No AC joint separation.          Mild humeral head  spurring.          High riding humeral head with the appearance of rotator cuff    insufficiency.          Summary:    1. Bony narrowing of the outlet based on the clavicle.    2. High riding humeral head.    3. MRI correlation recommended to assess the rotator cuff integrity..    This report was finalized on 07/03/2017 09:15 by Dr. Camilo Almodovar MD.             CT Head Without Contrast [[id number]]     Collected:  07/03/17 1114                   Updated:  07/03/17 1123             Narrative:                EXAMINATION: CT HEAD WO CONTRAST-           7/3/2017 9:43 AM EDT          HISTORY: seizures          In order to have a CT radiation dose as low as reasonably achievable    Automated Exposure Control was utilized for adjustment of the mA and/or    KV according to patient size.          DLP in mGycm= 821.          Ill-defined low density within both occipital lobes. No mass effect. No    acute hemorrhage.          Normal ventricle size.          No skull fracture is seen.    Clear paranasal sinuses.          Summary:    1. Bilateral occipital lobe hypodensity compatible with subacute    ischemic change. MRI correlation recommended.    2. Report called to Dr. Zamarripa in the emergency room at 11:15 AM.                                                                      This report was finalized on 07/03/2017 11:20 by Dr. Camilo Almodovar MD.             MRI Brain With & Without Contrast [[id number]]     Collected:  07/03/17 1238                   Updated:  07/03/17 1306             Narrative:                MRI BRAIN W WO CONTRAST- 7/3/2017 11:57 AM CDT          HISTORY: Ischemic changes; R13.12-Dysphagia, oropharyngeal phase          COMPARISON: None.            TECHNIQUE: Multiplanar imaging of the brain was performed in a routine    fashion before and after the intravenous injection of gadolinium    contrast.          FINDINGS:     Diffusion: No restriction of diffusion to suggest acute ischemia.          Midline  structures: Nondisplaced.          Ventricles: Normal in configuration and symmetric in size.          Masses: No masses or mass effect.          Basilar cisterns: Maintained.          Extra axial space: No abnormal extra-axial fluid.          Gray-white matter signal: There is abnormal signal. There is bilateral    subcortical signal on the T2 and FLAIR sequences involving the    cerebellum and posterior occipital and parietal lobes. There is no    associated enhancement. Most likely this is posterior reversible    encephalopathy syndrome. (P RES)          Cerebellum: Abnormal signal is noted in the cerebellum most likely    associated with the pres.          Brainstem: Normal.          Enhancement: No abnormal enhancement.          Other: Proximal cervical spinal cord is normal. Bilateral globes and    orbits are normal in appearance. Normal cerebrovascular flow voids    noted. Mucous cyst is noted in the right maxillary antrum mucosal    thickening is present in the left maxillary antrum                   Impression:                1. Posterior reversible encephalopathy syndrome. Follow-up in 8 weeks is    suggested.                 This report was finalized on 07/03/2017 12:53 by Dr. Adolph Echols MD.             XR Chest 1 View [[id number]]     Collected:  07/04/17 1543                   Updated:  07/04/17 1548             Narrative:                EXAMINATION: XR CHEST 1 VW- 7/4/2017 14:43 CST          HISTORY: Intubation          Comparison: None          Findings:    Endotracheal tube is in place with tip approximately 3 cm above the    jolly. Low lung volumes are present bilaterally. There is atelectasis    at the left lung base. The lung bases otherwise appear clear. The    pulmonary vasculature appears normal.                   Impression:                Impression:    1. Apparent appropriate endotracheal tube position.    2. Left basilar atelectasis.    This report was finalized on 07/04/2017 15:45 by  Dr. Driss Ward MD.             XR Chest 1 View [[id number]]     Collected:  07/05/17 0724                   Updated:  07/05/17 0728             Narrative:                Frontal supine radiograph of the chest 7/5/2017 3:10 AM CDT          History: Intubated Patient; R13.12-Dysphagia, oropharyngeal phase;    R56.9-Unspecified convulsions; T78.3XXS-Angioneurotic edema, sequela;    F10.231-Alcohol dependence with withdrawal delirium          Comparison: 07/04/2017           Findings:     Lines and tubes are stable in position. No new opacities or    pneumothoraces are visualized in the chest. The cardiomediastinal    silhouette and pulmonary vascularity are unchanged.            No acute osseous or soft tissue abnormality is noted.                    Impression:                Impression:     1. No significant interval change since previous exam.                This report was finalized on 07/05/2017 07:25 by Dr. Dilshad Silver MD.             XR Abdomen KUB [[id number]]     Collected:  07/05/17 1207                   Updated:  07/05/17 1210             Narrative:                EXAMINATION:   XR ABDOMEN KUB-  7/5/2017 12:07 PM CDT          HISTORY: NG tube placement          Single view the abdomen is obtained. Nasogastric tube is present with    the distal tip satisfactorily positioned in the fundus the stomach.    Bowel gas pattern is nonspecific.                   Impression:                Satisfactory placement of nasogastric tube    This report was finalized on [id number] by Dr. Adolph Echols MD.             XR Chest 1 View [[id number]]     Collected:  07/06/17 0718                   Updated:  07/06/17 0722             Narrative:                Frontal supine radiograph of the chest 7/6/2017 3:04 AM CDT          History: Intubated Patient; R13.12-Dysphagia, oropharyngeal phase;    R56.9-Unspecified convulsions; T78.3XXS-Angioneurotic edema, sequela;    F10.231-Alcohol dependence with withdrawal  delirium          Comparison: 07/05/2017           Findings:     The endotracheal tube is stable in position. An NG tube has been    advanced into the stomach.. No new opacities or pneumothoraces are    visualized in the chest. The cardiomediastinal silhouette and pulmonary    vascularity are unchanged.            No acute osseous or soft tissue abnormality is noted.                    Impression:                Impression:     1. No significant interval change since previous exam.                This report was finalized on 07/06/2017 07:19 by Dr. Dilshad Silver MD.             XR Chest 1 View [[id number]]     Collected:  07/07/17 0708                   Updated:  07/07/17 0712             Narrative:                EXAMINATION:   XR CHEST 1 VW-  7/7/2017 7:08 AM CDT          HISTORY: Intubated patient          Single view the chest obtained. The lungs are clear. Cardiac silhouettes    mildly enlarged. Endotracheal tube nasogastric tube are satisfactorily    position.          This compared prior study 07/06/2017.          Obdulia and stable single view chest    This report was finalized on 07/07/2017 07:09 by Dr. Adolph Echols MD.             XR Chest 1 View [[id number]]     Collected:  07/07/17 2137                   Updated:  07/07/17 2153             Narrative:                EXAMINATION: XR CHEST 1 VW- 7/7/2017 9:31 PM CDT          HISTORY: Central line placement.          COMPARISON: 07/07/2017.          FINDINGS:    The left subclavian central line has been placed with tip in the    proximal SVC. No pneumothorax is identified. The endotracheal and    enteric tubes remain in place. Scattered areas of subsegmental    atelectasis are present. The heart is magnified but felt to be mildly    enlarged. The pulmonary vasculature is stable.                   Impression:                Interval placement of left subclavian central line without    evidence of pneumothorax.    This report was finalized on 07/07/2017  21:50 by Dr. Driss Ward MD.             US Venous Doppler Upper Extremity Left (duplex) [[id number]]     Collected:  07/08/17 0719                   Updated:  07/08/17 0723             Narrative:                EXAMINATION:   US VENOUS DOPPLER UPPER EXTREMITY LEFT (DUPLEX)-     7/8/2017 7:19 AM CDT          HISTORY: Venous Doppler analysis of the left upper extremity.          Irineo vein demonstrates compression and color flow.          Left subclavian vein demonstrates color flow and augmentation.          Axillary vein demonstrates compression color flow and augmentation.          The brachial vein demonstrates color flow and compression. The left    basilic vein demonstrates lack of compression above the elbow. This lack    of compression is consistent with thrombus in the basilic vein. The    cephalic vein demonstrates color flow compression. The radial vein    demonstrates color flow compression. The ulnar vein demonstrates color    flow compression.                   Impression:                Thrombus is visualized in the left basilic vein above the    elbow.    This report was finalized on 07/08/2017 07:20 by Dr. Adolph Echols MD.             US Arterial Doppler Upper Extremity Left [[id number]]     Collected:  07/08/17 0720                   Updated:  07/08/17 0725             Narrative:                EXAMINATION:   US ARTERIAL DOPPLER UPPER EXTREMITY  LEFT-  7/8/2017 7:20    AM CDT          HISTORY: Left upper extremity duplex arterial analysis          On this examination the left subclavian artery demonstrates color flow    and peak systolic velocity 45 cm/s. Axillary artery demonstrates color    flow with a peak systolic velocity 104 cm/s. The brachial artery    demonstrates peak systolic velocity 130 cm/s with triphasic waveform.    The radial artery demonstrates color flow and triphasic waveform peak    systolic velocity 78 cm/s. The ulnar artery demonstrates triphasic    waveform color flow and  peak systolic velocity 67 cm/s.                   Impression:                Negative left upper extremity arterial duplex ultrasound    This report was finalized on 07/08/2017 07:22 by Dr. Adolph Echols MD.             XR Chest 1 View [[id number]]     Collected:  07/08/17 0846                   Updated:  07/08/17 0935             Narrative:                EXAMINATION:   XR CHEST 1 VW-  7/8/2017 8:45 AM CDT          HISTORY: Frontal supine radiograph of the chest 7/8/2017 3:22 AM CDT          COMPARISON: 07/07/2017.          FINDINGS:     The lungs are clear. The cardiomediastinal silhouette and pulmonary    vascularity are within normal limits. Endotracheal tube is    satisfactorily positioned. Left subclavian catheter is unchanged.          The osseous structures and surrounding soft tissues demonstrate no acute    abnormality.                   Impression:                1. No radiographic evidence of acute cardiopulmonary process.          This report was finalized on 07/08/2017 09:31 by Dr. Adolph Echols MD.             CT Head With & Without Contrast [[id number]]     Collected:  07/08/17 1233                   Updated:  07/08/17 1239             Narrative:                CT BRAIN without and with contrast dated 7/8/2017 11:43 AM CDT          HISTORY: Possible sublingual abscess          COMPARISON: None           DOSE LENGTH PRODUCT: 1779 mGy cm          In order to have a CT radiation dose as low as reasonably achievable,    Automated Exposure Control was utilized for adjustment of the mA and/or    KV according to patient size.          TECHNIQUE: Serial axial tomographic images of the brain were obtained    without and with the use of intravenous contrast.           FINDINGS:     The midline structures are nondisplaced. The ventricles and basilar    cisterns are normal in size and configuration. There is no evidence of    intracranial hemorrhage or mass-effect. The gray-white matter    differentiation is  maintained. The sulci have a normal configuration,    and there are no abnormal extra-axial fluid collections. The structures    of the posterior fossa are unremarkable.           The included orbits and their contents are unremarkable.    . The    visualized osseous structures and overlying soft tissues of the skull    and face are unremarkable.           There is no abnormal enhancement.                   Impression:                1. Negative CT brain without and with contrast                This report was finalized on 07/08/2017 12:36 by Dr. Adolph Echols MD.             CT Soft Tissue Neck With & Without Contrast [[id number]]     Collected:  07/08/17 1258                   Updated:  07/08/17 1307             Narrative:                CT SOFT TISSUE NECK W WO CONTRAST- 7/8/2017 11:43 AM CDT          HISTORY: Possible sublingual or ligual abcess; R13.12-Dysphagia,    oropharyngeal phase; R56.9-Unspecified convulsions;    T78.3XXS-Angioneurotic edema, sequela; F10.231-Alcohol dependence with    withdrawal delirium           COMPARISON: NONE           DOSE LENGTH PRODUCT: 602 mGy cm. Automated exposure control was also    utilized to decrease patient radiation dose.          TECHNIQUE: Serial helical tomographic images of the neck were obtained    in the standard fashion following the intravenous administration of    Isovue contrast.            FINDINGS:      Orbits, paranasal sinuses, and skull base: Normal          Nasopharynx: A nasogastric tube is present. Fluid is present in the    right maxillary antrum mucosal thickening is present left maxillary    antrum          Suprahyoid neck: Endotracheal tube is present. Pleural cavities obscured    by dental work. The base the tongue appears symmetric as visualized.    Submandibular glands are visualized. Sternomastoids sternocleidomastoid    muscles are symmetric.          Infrahyoid neck: Normal larynx, hypopharynx and supraglottis.          Thyroid: Normal.           Thoracic inlet: Pleural thickening is noted in the apex of the right    chest.    Lymph nodes: Normal. No lymphadenopathy.          Vascular structures: Normal.          Bones: Degenerative disc disease and degenerative spondylosis is present    in the cervical spine.          Other findings: None.                   Impression:                1. No acute abnormalities identified on the enhanced CT soft tissue the    neck.    2. Nasogastric tube and endotracheal tube are present. Artifact from    dental work is noted.    3. Fluid in the right maxillary antrum is present    4. Degenerative spondylosis in the cervical spine                This report was finalized on 07/08/2017 13:04 by Dr. Adolph Echols MD.             XR Chest 1 View [[id number]]     Collected:  07/09/17 0729                   Updated:  07/09/17 0733             Narrative:                EXAMINATION:   XR CHEST 1 -  7/9/2017 7:29 AM CDT          HISTORY: Patient intubated          Single view the chest obtained. Atelectasis right lung base is noted.    Left lung is clear. Cardiac silhouettes mildly enlarged. Nasogastric    tube and endotracheal tube are satisfactorily position.                   Impression:                Mild atelectasis right lung base slightly increased since    July 8    This report was finalized on 07/09/2017 07:30 by Dr. Adolph Echols MD.             XR Chest 1 View [[id number]]     Collected:  07/10/17 0708                   Updated:  07/10/17 0712             Narrative:                EXAMINATION:   XR CHEST 1 -  7/10/2017 7:08 AM CDT          HISTORY: Intubated          Single view the chest obtained. Left lung is clear. Cardiac silhouettes    mildly enlarged unchanged from July 9. Left subclavian catheter is    present. Nasogastric tubes present. Mild atelectasis right lung base is    present.                   Impression:                Atelectasis right lung base persists not significantly    changed from July  9.                2. Mild-to-moderate cardiomegaly also unchanged    This report was finalized on 07/10/2017 07:09 by Dr. Adolph Echols MD.             XR Abdomen KUB [[id number]]     Collected:  07/10/17 1314                   Updated:  07/10/17 1319             Narrative:                HISTORY: Abdomen distended and firm.          FINDINGS: KUB radiograph demonstrates a nonspecific bowel gas pattern.    There is noted to be a distended loop of what I suspect represents the    sigmoid colon with a sigmoid volvulus in the differential. I do not see    evidence of obstruction or pneumatosis. There is no evidence of    pneumoperitoneum.                   Impression:                . Nonspecific bowel gas pattern with gas in both small bowel    and colon. There is some asymmetric distention of what appears to be a    loop of the sigmoid colon with a sigmoid volvulus a differential    although considered unlikely given the lack of distention of the more    proximal bowel and the patient's age. If symptoms are persistent follow    up with CT imaging could be obtained for further characterization.    This report was finalized on 07/10/2017 13:16 by Dr. Dilshad Silver MD.             CT Abdomen Pelvis Without Contrast [[id number]]     Collected:  07/10/17 2035                   Updated:  07/10/17 2047             Narrative:                EXAMINATION: CT ABDOMEN PELVIS WO CONTRAST- 7/10/2017 8:35 PM CDT          HISTORY: Abdominal distention and pain, possible sigmoid volvulus.          DOSE: 1821 mGycm (Automatic exposure control technique was implemented    in an effort to keep the radiation dose as low as possible without    compromising image quality)          REPORT: Spiral CT of the abdomen and pelvis was performed without    intravenous or oral contrast from the lung bases through the pubic    symphysis. Reconstructed coronal and sagittal images are also reviewed.    Comparison: KUB on the same date. There is  no previous CT. Lung windows    demonstrate consolidation of the lower lobe laterally with air    bronchograms, probable bilateral pneumonia mixed with atelectasis. There    are tiny bilateral pleural effusions. A nasogastric tube is present in    good position. There is mild cardiomegaly. The liver and spleen appear    homogeneous. The gallbladder is slightly contracted. Pancreas and    adrenal glands are within normal limits. No nephrolithiasis is seen and    there is no hydronephrosis. There is perinephric fat stranding    bilaterally, which is nonspecific. There is no evidence of bowel    obstruction or sigmoid volvulus. The sigmoid colon is redundant. There    is moderate sigmoid diverticulosis. There is nonspecific increased    attenuation of fat planes in the pelvis, including presacral fat planes    and fat planes at the level of the aortic bifurcation and inguinal    regions. There is also mildly increased attenuation of fat planes over    the flanks, slightly greater on the right. There are fat containing    inguinal hernias bilaterally. No intra-abdominal abscess or free air is    identified. The inflammatory changes do not appear to be associated with    the sigmoid colon. The appendix is normal. There is a small    fat-containing periumbilical hernia that measures 2.6 cm. Review of bone    windows is unremarkable.                   Impression:                1. No evidence of bowel obstruction or sigmoid volvulus. There is    moderate sigmoid diverticulosis without acute diverticulitis.    2. Nonspecific mild fat stranding/inflammation within the    retroperitoneum and extraperitoneal fat planes in the pelvis, without    evidence of an abscess.    3. Extensive infiltrates in the lower lobes with air bronchograms    suspicious for acute lateral lower lobe pneumonia probably combined with    atelectasis.                This report was finalized on 07/10/2017 20:44 by Dr. Marvin Mar MD.             XR  Chest 1 View [[id number]]     Collected:  07/11/17 0722                   Updated:  07/11/17 0726             Narrative:                EXAMINATION:   XR CHEST 1 VW-  7/11/2017 7:22 AM CDT          HISTORY: Respiratory failure.          Frontal upright radiograph of the chest 7/11/2017 3:15 AM CDT          COMPARISON: 07/10/2017.          FINDINGS:     The lungs are clear. Cardiac silhouettes mildly enlarged. Left    subclavian catheter and endotracheal tube are present and satisfactorily    positioned.           The osseous structures and surrounding soft tissues demonstrate no acute    abnormality.                   Impression:                1. No radiographic evidence of acute cardiopulmonary process.                This report was finalized on 07/11/2017 07:23 by Dr. Adolph Echols MD.             XR Chest 1 View [[id number]]     Collected:  07/12/17 0705                   Updated:  07/12/17 0710             Narrative:                EXAMINATION:   XR CHEST 1 VW-  7/12/2017 7:05 AM CDT          HISTORY: Respiratory failure          Single view the chest obtained. Left lung is clear. Mild right    infrahilar atelectasis is present. Left diaphragms indistinct consistent    with atelectasis left lower lobe. Infiltrate tube nasogastric tube are    present. Left subclavian catheter is present.          IMPRESSION    1. Atelectasis left lower lobe.                2. Mild atelectasis present in the right infrahilar region.    This report was finalized on 07/12/2017 07:07 by Dr. Adolph Echols MD.             XR Abdomen KUB [[id number]]     Collected:  07/12/17 0713                   Updated:  07/12/17 0716             Narrative:                EXAMINATION:   XR ABDOMEN KUB-  7/12/2017 7:13 AM CDT          HISTORY: NG tube          Single view the abdomen is obtained. Nasogastric tube is present    satisfactorily positioned in the fundus the stomach.                   Impression:                Satisfactory placement of  nasogastric tube    This report was finalized on 07/12/2017 07:13 by Dr. Adolph Echols MD.             XR Chest 1 View [[id number]]     Collected:  07/13/17 0705                   Updated:  07/13/17 0709             Narrative:                EXAMINATION: XR CHEST 1 VW-. 7/13/2017 7:05 AM CDT          CHEST, ONE VIEW:          HISTORY: Respiratory failure          COMPARISON: 07/12/2017          A single frontal chest radiograph was obtained.          FINDINGS:          Tracheostomy tube and NG tube identified. Left-sided central venous    catheter observed.          Patchy lower lobe airspace opacities again noted with diminished lung    volumes with shallow inspiration likely stable.                         Impression:                1. Stable one-day appearance the chest.          This report was finalized on 07/13/2017 07:06 by Dr. Pierre Pan MD.             US Renal Artery Complete [[id number]]     Collected:  07/12/17 2008                   Updated:  07/13/17 1645             Narrative:                EXAMINATION: US RENAL ARTERY COMPLETE- 7/12/2017 8:08 PM CDT          HISTORY: Uncontrolled hypertension.          REPORT: Sonographic images of the kidneys were obtained, examination    includes Doppler analysis of the renal arteries, using color, gray scale    and spectral Doppler techniques were duplex study. There are no    comparison studies.          The right kidney measures 10.3 x 5.5 x 4.9 cm and has normal morphology    and echogenicity. There is no hydronephrosis. The tail flow velocities    are provided on the ultrasound worksheet, however the origin the right    renal artery is obscured. The mid right renal artery, the peak systolic    velocity measures 116.8 cm/s, this corresponds with the velocity ratio    compared to the abdominal aorta of 1.72. The resistive index measures    0.55. Velocities in the distal right renal arteries are normal.          Left kidney measures 11.5 x 6.7 x 5.3 cm and has  normal morphology and    echogenicity, there is no hydronephrosis on the left. Doppler images are    limited for the left kidney, both the origin and left mid renal artery    are obscured. The PSV at the distal left renal artery measures 99.2 cm/s    with a velocity ratio compared to the abdominal aorta of 1.46 and    resistive index of 0.60. The bladder appears unremarkable.                   Impression:                1. Normal morphological appearance of both kidneys.    2. Limited study with obscuration of the renal artery origins as well as    the mid left renal artery, no Doppler evidence for significant renal    artery stenosis is seen however. If more detailed evaluation is    indicated clinically, CTA would be the best study.    This report was finalized on 07/12/2017 20:13 by Dr. Marvin Mar MD.             US Renal Bilateral [[id number]]     Collected:  07/12/17 2008                   Updated:  07/13/17 1645             Narrative:                EXAMINATION: US RENAL ARTERY COMPLETE- 7/12/2017 8:08 PM CDT          HISTORY: Uncontrolled hypertension.          REPORT: Sonographic images of the kidneys were obtained, examination    includes Doppler analysis of the renal arteries, using color, gray scale    and spectral Doppler techniques were duplex study. There are no    comparison studies.          The right kidney measures 10.3 x 5.5 x 4.9 cm and has normal morphology    and echogenicity. There is no hydronephrosis. The tail flow velocities    are provided on the ultrasound worksheet, however the origin the right    renal artery is obscured. The mid right renal artery, the peak systolic    velocity measures 116.8 cm/s, this corresponds with the velocity ratio    compared to the abdominal aorta of 1.72. The resistive index measures    0.55. Velocities in the distal right renal arteries are normal.          Left kidney measures 11.5 x 6.7 x 5.3 cm and has normal morphology and    echogenicity, there is  no hydronephrosis on the left. Doppler images are    limited for the left kidney, both the origin and left mid renal artery    are obscured. The PSV at the distal left renal artery measures 99.2 cm/s    with a velocity ratio compared to the abdominal aorta of 1.46 and    resistive index of 0.60. The bladder appears unremarkable.                   Impression:                1. Normal morphological appearance of both kidneys.    2. Limited study with obscuration of the renal artery origins as well as    the mid left renal artery, no Doppler evidence for significant renal    artery stenosis is seen however. If more detailed evaluation is    indicated clinically, CTA would be the best study.    This report was finalized on 07/12/2017 20:13 by Dr. Marvin Mar MD.             XR Chest 1 View [[id number]]     Collected:  07/14/17 0721                   Updated:  07/14/17 0725             Narrative:                Frontal supine radiograph of the chest 7/14/2017 2:10 CST          History: respiratory failure, patient intubated.; R13.12-Dysphagia,    oropharyngeal phase; R56.9-Unspecified convulsions;    T78.3XXS-Angioneurotic edema, sequela; F10.231-Alcohol dependence with    withdrawal delirium; Z74.09-Other reduced mobility          Comparison: Chest x-ray dated 07/13/2017           Findings:     Right PICC in place terminating near the atrial caval junction. Left    subclavian catheter has been removed. Remaining lines and tubes are    stable in position. No new opacities or pneumothoraces are visualized in    the chest. The cardiomediastinal silhouette and pulmonary vascularity    are unchanged.            No acute osseous or soft tissue abnormality is noted.                    Impression:                Impression:      No significant interval change since previous exam.                This report was finalized on 07/14/2017 07:22 by Dr. Martita Finnegan MD.             XR Abdomen KUB [[id number]]     Collected:   07/14/17 1254                   Updated:  07/14/17 1257             Narrative:                EXAMINATION:   XR ABDOMEN KUB-  7/14/2017 12:54 PM CDT          HISTORY: NG tube placement          Single view the abdomen is obtained. Nasogastric tube is present    satisfactorily position in the fundus the stomach.                   Impression:                Satisfactory placement nasogastric tube    This report was finalized on 07/14/2017 12:54 by Dr. Adolph Echols MD.             XR Chest 1 View [[id number]]     Collected:  07/15/17 1012                   Updated:  07/15/17 1017             Narrative:                HISTORY: Respiratory failure, intubated          CXR: A frontal view the chest is obtained.          Comparison: 07/14/2017          Findings: Tracheostomy tube is appropriate in position. The prior    enteric tube has been removed. A new right upper extremity PICC line is    in place with the tip near the atrial caval junction.          There is a diminished level of inspiration. There is mild elevation of    the right hemidiaphragm. There are left lower lobe opacities. There is a    questionable small left pleural effusion. There is no pneumothorax. The    cardiomediastinal contours are similar                   Impression:                1. Removal of the enteric tube with placement of a right upper extremity    PICC line. Tracheostomy tube appropriate in position.    2. Left lower lobe opacities may be patchy atelectasis. Consolidation    considered.           This report was finalized on 07/15/2017 10:14 by Dr. Sri Rivera MD.             US Venous Doppler Lower Extremity Bilateral (duplex) [[id number]]     Collected:  07/16/17 1022                   Updated:  07/16/17 1025             Narrative:                DUPLEX ULTRASOUND OF THE LOWER EXTREMITIES 7/16/2017 8:44 CST          HISTORY: BLE edema & pain; R13.12-Dysphagia, oropharyngeal phase;    R56.9-Unspecified convulsions;  T78.3XXS-Angioneurotic edema, sequela;    F10.231-Alcohol dependence with withdrawal delirium; Z74.09-Other    reduced mobility          COMPARISON: NONE          FINDINGS:    Transverse and longitudinal grayscale and Doppler sonographic images of    bilateral lower extremities were obtained.          There is normal flow and compressibility of bilateral common femoral,    superficial femoral, popliteal, peroneal, anterior tibial, and posterior    tibial veins.                   Impression:                1. Normal duplex ultrasound of bilateral lower extremities without    evidence of DVT.          This report was finalized on 07/16/2017 10:22 by Dr. Sri Rivera MD.             FL Video Swallow With Speech [[id number]]     Collected:  07/17/17 1339                   Updated:  07/17/17 1343             Narrative:                EXAMINATION: FL VIDEO SWALLOW W SPEECH-  7/17/2017 2:39 PM EDT          HISTORY: Dysphagia          COMPARISON:None          TECHNIQUE:          Image number: 1          Fluoroscopy time: 1.2 minutes          FINDINGS:          The oral and pharyngeal phase of swallowing was evaluated with multiple    barium consistencies.          Patient tolerated all consistencies well without laryngeal penetration    or aspiration.                   Impression:                1. Negative dysphagia study.    This report was finalized on 07/17/2017 13:40 by Dr. Pierre Pan MD.                       Lab Results (all)               Procedure     Component     Value     Units     Date/Time                       CBC & Differential [[id number]]     Collected:  07/03/17 0807             Specimen:  Blood     Updated:  07/03/17 0832             Narrative:                The following orders were created for panel order CBC & Differential.    Procedure                               Abnormality         Status                       ---------                               -----------         ------                        CBC Auto Differential[[id number]]        Abnormal            Final result                      Please view results for these tests on the individual orders.             CBC Auto Differential [[id number]]  (Abnormal)     Collected:  07/03/17 0807             Specimen:  Blood     Updated:  07/03/17 0832                   WBC     11.95 (H)     10*3/mm3                         RBC     3.93 (L)     10*6/mm3                         Hemoglobin     11.9 (L)     g/dL                         Hematocrit     34.1 (L)     %                         MCV     86.8     fL                         MCH     30.3     pg                         MCHC     34.9     g/dL                         RDW     14.0     %                         RDW-SD     44.0     fl                         MPV     9.6     fL                         Platelets     245     10*3/mm3                         Neutrophil %     82.0 (H)     %                         Lymphocyte %     8.3 (L)     %                         Monocyte %     8.9     %                         Eosinophil %     0.3     %                         Basophil %     0.2     %                         Immature Grans %     0.3     %                         Neutrophils, Absolute     9.81 (H)     10*3/mm3                         Lymphocytes, Absolute     0.99     10*3/mm3                         Monocytes, Absolute     1.06     10*3/mm3                         Eosinophils, Absolute     0.03     10*3/mm3                         Basophils, Absolute     0.02     10*3/mm3                         Immature Grans, Absolute     0.04 (H)     10*3/mm3                   Comprehensive Metabolic Panel [[id number]]  (Abnormal)     Collected:  07/03/17 0807             Specimen:  Blood     Updated:  07/03/17 0841                   Glucose     127 (H)     mg/dL                         BUN     9     mg/dL                         Creatinine     1.19     mg/dL                         Sodium     141     mmol/L                          Potassium     3.0 (L)     mmol/L                         Chloride     102     mmol/L                         CO2     29.0     mmol/L                         Calcium     8.8     mg/dL                         Total Protein     6.9     g/dL                         Albumin     4.00     g/dL                         ALT (SGPT)     39     U/L                         AST (SGOT)     72 (H)     U/L                         Alkaline Phosphatase     69     U/L                         Total Bilirubin     0.6     mg/dL                         eGFR Non African Amer     64     mL/min/1.73                         Globulin     2.9     gm/dL                         A/G Ratio     1.4     g/dL                         BUN/Creatinine Ratio     7.6                   Anion Gap     10.0     mmol/L                   Urinalysis With / Culture If Indicated [[id number]]  (Abnormal)     Collected:  07/03/17 0834             Specimen:  Urine from Urine, Clean Catch     Updated:  07/03/17 0858                   Color, UA     Yellow                   Appearance, UA     Clear                   pH, UA     7.5                   Specific [place], UA     1.014                   Glucose, UA     Negative                   Ketones, UA     Negative                   Bilirubin, UA     Negative                   Blood, UA     Small (1+) (A)                   Protein, UA     30 mg/dL (1+) (A)                   Leuk Esterase, UA     Negative                   Nitrite, UA     Negative                   Urobilinogen, UA     0.2 E.U./dL             Urinalysis, Microscopic Only [[id number]]  (Abnormal)     Collected:  07/03/17 0834             Specimen:  Urine from Urine, Clean Catch     Updated:  07/03/17 0858                   RBC, UA     3-5 (A)     /HPF                         WBC, UA     0-2 (A)     /HPF                         Bacteria, UA     None Seen     /HPF                         Squamous Epithelial Cells, UA     0-2     /HPF                          Hyaline Casts, UA     None Seen     /LPF                         Methodology     Automated Microscopy             Urine Drug Screen [[id number]]  (Normal)     Collected:  07/03/17 0834             Specimen:  Urine from Urine, Clean Catch     Updated:  07/03/17 0930                   Amphetamine Screen, Urine     Negative                   Barbiturates Screen, Urine     Negative                   Benzodiazepine Screen, Urine     Negative                   Cocaine Screen, Urine     Negative                   Methadone Screen, Urine     Negative                   Opiate Screen     Negative                   Phencyclidine (PCP), Urine     Negative                   THC, Screen, Urine     Negative             Narrative:                Negative Thresholds For Drugs Screened in Urine:         Amphetamines          500 ng/ml    Barbiturates          200 ng/ml    Benzodiazepines       200 ng/ml    Cocaine               150 ng/ml    Methadone             150 ng/ml    Opiates               300 ng/ml    Phencyclidine         25 ng/ml    THC                                                       50 ng/ml         The normal value for all drugs tested is negative. This report includes final unconfirmed screening results.  A positive result by this assay can be, at your request, sent to the Reference Lab for confirmation by gas chromatography. Unconfirmed results must not be used for non-medical purposes, such as employment or legal testing. Clinical consideration should be applied to any drug of abuse test result, particularly when unconfirmed results are used.             Basic Metabolic Panel [[id number]]  (Abnormal)     Collected:  07/04/17 0937             Specimen:  Blood     Updated:  07/04/17 0957                   Glucose     126 (H)     mg/dL                         BUN     12     mg/dL                         Creatinine     1.13     mg/dL                         Sodium     142     mmol/L                         Potassium      3.8     mmol/L                         Chloride     105     mmol/L                         CO2     26.0     mmol/L                         Calcium     9.1     mg/dL                         eGFR Non African Amer     68     mL/min/1.73                         BUN/Creatinine Ratio     10.6                   Anion Gap     11.0     mmol/L                   Narrative:                GFR Normal >60    Chronic Kidney Disease <60    Kidney Failure <15             Magnesium [[id number]]  (Normal)     Collected:  07/04/17 0937             Specimen:  Blood     Updated:  07/04/17 0957                   Magnesium     2.0     mg/dL                   Blood Gas, Arterial [[id number]]  (Abnormal)     Collected:  07/04/17 1635             Specimen:  Arterial Blood     Updated:  07/04/17 1640                   Site     Arterial: left brachial                   Gera's Test     --                             Documented in [facility]                           pH, Arterial     7.408     pH units                         pCO2, Arterial     44.1     mm Hg                         pO2, Arterial     235.2 (H)     mm Hg                         HCO3, Arterial     27.2 (H)     mmol/L                         Base Excess, Arterial     2.1 (H)     mmol/L                         O2 Saturation, Arterial     99.5     %                         O2 Saturation Calculated     99.5     %                         Barometric Pressure for Blood Gas     --     mmHg                                   Component not reported at this site.                           Modality     Ventilator                   FIO2     60     %                         Ventilator Mode     AC                   Rate     10.0     Breaths/minute                         PEEP     5.0                   Vent CPAP/PEEP     5.0             Narrative:                Serial Number: [id number]    : [id number]             CBC (No Diff) [[id number]]  (Abnormal)     Collected:  07/05/17 0156              Specimen:  Blood     Updated:  07/05/17 0213                   WBC     15.95 (H)     10*3/mm3                         RBC     4.19 (L)     10*6/mm3                         Hemoglobin     12.8 (L)     g/dL                         Hematocrit     38.1 (L)     %                         MCV     90.9     fL                         MCH     30.5     pg                         MCHC     33.6     g/dL                         RDW     14.7     %                         RDW-SD     49.1     fl                         MPV     9.6     fL                         Platelets     246     10*3/mm3                   Blood Gas, Arterial [[id number]]  (Abnormal)     Collected:  07/05/17 0224             Specimen:  Arterial Blood     Updated:  07/05/17 0227                   Site     Arterial: right radial                   Gera's Test     --                             Documented in [facility]                           pH, Arterial     7.408     pH units                         pCO2, Arterial     40.7     mm Hg                         pO2, Arterial     101.5 (H)     mm Hg                         HCO3, Arterial     25.1     mmol/L                         Base Excess, Arterial     0.4     mmol/L                         O2 Saturation, Arterial     97.7     %                         O2 Saturation Calculated     97.7     %                         Barometric Pressure for Blood Gas     --     mmHg                                   Component not reported at this site.                           Modality     Ventilator                   FIO2     50     %                         Ventilator Mode     Assist                   Rate     10.0     Breaths/minute                         PEEP     5.0                   Vent CPAP/PEEP     5.0             Narrative:                Serial Number: [id number]    : [id number]             Basic Metabolic Panel [[id number]]  (Normal)     Collected:  07/05/17 0158             Specimen:  Blood     Updated:  07/05/17  0229                   Glucose     96     mg/dL                         BUN     16     mg/dL                         Creatinine     1.10     mg/dL                         Sodium     143     mmol/L                         Potassium     3.7     mmol/L                         Chloride     105     mmol/L                         CO2     28.0     mmol/L                         Calcium     9.1     mg/dL                         eGFR Non African Amer     71     mL/min/1.73                         BUN/Creatinine Ratio     14.5                   Anion Gap     10.0     mmol/L                   Narrative:                GFR Normal >60    Chronic Kidney Disease <60    Kidney Failure <15             Blood Gas, Arterial [[id number]]  (Abnormal)     Collected:  07/05/17 0322             Specimen:  Arterial Blood     Updated:  07/05/17 0326                   Site     Arterial: right radial                   Gera's Test     --                             Documented in [facility]                           pH, Arterial     7.414     pH units                         pCO2, Arterial     43.0     mm Hg                         pO2, Arterial     86.5     mm Hg                         HCO3, Arterial     26.9 (H)     mmol/L                         Base Excess, Arterial     2.0     mmol/L                         O2 Saturation, Arterial     96.6     %                         O2 Saturation Calculated     96.6     %                         Barometric Pressure for Blood Gas     --     mmHg                                   Component not reported at this site.                           Modality     Ventilator                   FIO2     30     %                         Ventilator Mode     Assist                   Rate     10.0     Breaths/minute                         PEEP     5.0                   Vent CPAP/PEEP     5.0             Narrative:                Serial Number: [id number]    : [id number]             CBC (No Diff) [[id number]]  (Abnormal)      Collected:  07/06/17 0222             Specimen:  Blood     Updated:  07/06/17 0256                   WBC     9.27     10*3/mm3                         RBC     3.94 (L)     10*6/mm3                         Hemoglobin     12.1 (L)     g/dL                         Hematocrit     35.4 (L)     %                         MCV     89.8     fL                         MCH     30.7     pg                         MCHC     34.2     g/dL                         RDW     14.7     %                         RDW-SD     48.2     fl                         MPV     9.7     fL                         Platelets     216     10*3/mm3                   Basic Metabolic Panel [[id number]]  (Abnormal)     Collected:  07/06/17 0222             Specimen:  Blood     Updated:  07/06/17 0257                   Glucose     87     mg/dL                         BUN     15     mg/dL                         Creatinine     0.98     mg/dL                         Sodium     142     mmol/L                         Potassium     3.0 (L)     mmol/L                         Chloride     108     mmol/L                         CO2     27.0     mmol/L                         Calcium     8.4     mg/dL                         eGFR Non African Amer     81     mL/min/1.73                         BUN/Creatinine Ratio     15.3                   Anion Gap     7.0     mmol/L                   Narrative:                GFR Normal >60    Chronic Kidney Disease <60    Kidney Failure <15             Magnesium [[id number]]  (Normal)     Collected:  07/06/17 0222             Specimen:  Blood     Updated:  07/06/17 0257                   Magnesium     2.1     mg/dL                   Blood Gas, Arterial [[id number]]     Collected:  07/06/17 0356             Specimen:  Arterial Blood     Updated:  07/06/17 0401                   Site     Arterial: right radial                   Gera's Test     --                             Documented in Rapid Comm                           pH,  Arterial     7.430     pH units                         pCO2, Arterial     35.9     mm Hg                         pO2, Arterial     95.6     mm Hg                         HCO3, Arterial     23.3     mmol/L                         Base Excess, Arterial     -0.5     mmol/L                         O2 Saturation, Arterial     97.5     %                         O2 Saturation Calculated     97.5     %                         Barometric Pressure for Blood Gas     --     mmHg                                   Component not reported at this site.                           Modality     Ventilator                   FIO2     30     %                         Ventilator Mode     AC                   Rate     10.0     Breaths/minute                         PEEP     5.0                   Vent CPAP/PEEP     5.0             Narrative:                Serial Number: [id number]    : [id number]             POC Glucose Fingerstick [[id number]]  (Normal)     Collected:  07/06/17 0738             Specimen:  Blood     Updated:  07/06/17 0750                   Glucose     104     mg/dL                                   : [id number] Azalia Gross LMeter ID: [id number]                     Gastrointestinal Panel, PCR [[id number]]  (Abnormal)     Collected:  07/06/17 2327             Specimen:  Stool from Per Rectum     Updated:  07/07/17 0124                   Campylobacter     Not Detected                   Clostridium difficile (toxin A/B)     Detected (A)                                  Due to the high asymptomatic carriage rates, especially in young children, the clinical relevance of the detection of toxigenic C. difficile from stool should be considered in the context of other clinical findings, patient age, and risk factors including hospitalization and antibiotic exposure.                           Plesiomonas shigelloides     Not Detected                   Salmonella     Not Detected                   Vibrio     Not Detected                    Vibrio cholerae     Not Detected                   Yersinia enterocolitica     Not Detected                   Enteroaggregative E. coli (EAEC)     Not Detected                   Enteropathogenic E. coli (EPEC)     Not Detected                   Enterotoxigenic E. coli (ETEC) lt/st     Not Detected                   Shiga-like toxin-producing E. coli (STEC) stx1/stx2     Not Detected                   E. coli O157     Not Detected                   Shigella/Enteroinvasive E. coli (EIEC)     Not Detected                   Cryptosporidium     Not Detected                   Cyclospora cayetanensis     Not Detected                   Entamoeba histolytica     Not Detected                   Giardia lamblia     Not Detected                   Adenovirus F40/41     Not Detected                   Astrovirus     Not Detected                   Norovirus GI/GII     Not Detected                   Rotavirus A     Not Detected                   Sapovirus (I, II, IV or V)     Not Detected             Basic Metabolic Panel [[id number]]  (Abnormal)     Collected:  07/07/17 0204             Specimen:  Blood     Updated:  07/07/17 0317                   Glucose     119 (H)     mg/dL                         BUN     14     mg/dL                         Creatinine     1.05     mg/dL                         Sodium     142     mmol/L                         Potassium     3.2 (L)     mmol/L                         Chloride     111 (H)     mmol/L                         CO2     23.0 (L)     mmol/L                         Calcium     8.0 (L)     mg/dL                         eGFR Non African Amer     74     mL/min/1.73                         BUN/Creatinine Ratio     13.3                   Anion Gap     8.0     mmol/L                   Narrative:                GFR Normal >60    Chronic Kidney Disease <60    Kidney Failure <15             Blood Gas, Arterial [[id number]]  (Abnormal)     Collected:  07/07/17 0339             Specimen:   Arterial Blood     Updated:  07/07/17 0343                   Site     Arterial: right radial                   Gera's Test     --                             Documented in Rapid Comm                           pH, Arterial     7.434     pH units                         pCO2, Arterial     31.5 (L)     mm Hg                         pO2, Arterial     76.4 (L)     mm Hg                         HCO3, Arterial     20.6 (L)     mmol/L                         Base Excess, Arterial     -2.5 (L)     mmol/L                         O2 Saturation, Arterial     95.8     %                         O2 Saturation Calculated     95.8     %                         Barometric Pressure for Blood Gas     --     mmHg                                   Component not reported at this site.                           Modality     Ventilator                   FIO2     30     %                         Ventilator Mode     AC                   Rate     10.0     Breaths/minute                         PEEP     5.0                   Vent CPAP/PEEP     5.0             Narrative:                Serial Number: [id number]    : [id number]             Clostridium Difficile Toxin, PCR [[id number]]  (Abnormal)     Collected:  07/06/17 1844             Specimen:  Stool from Per Rectum     Updated:  07/07/17 1348                   C. Difficile Toxins by PCR     Positive (A)             Blood Gas, Arterial [[id number]]  (Abnormal)     Collected:  07/08/17 0214             Specimen:  Arterial Blood     Updated:  07/08/17 0217                   Site     Arterial: right radial                   Gear's Test     --                             Documented in Rapid Comm                           pH, Arterial     7.422     pH units                         pCO2, Arterial     35.8     mm Hg                         pO2, Arterial     71.1 (L)     mm Hg                         HCO3, Arterial     22.8     mmol/L                         Base Excess, Arterial     -1.1     mmol/L                          O2 Saturation, Arterial     94.7     %                         O2 Saturation Calculated     94.7     %                         Barometric Pressure for Blood Gas     --     mmHg                                   Component not reported at this site.                           Modality     Ventilator                   FIO2     30     %                         Ventilator Mode     Assist                   Rate     10.0     Breaths/minute                         PEEP     5.0                   Vent CPAP/PEEP     5.0             Narrative:                Serial Number: [id number]    : [id number]             Basic Metabolic Panel [[id number]]  (Abnormal)     Collected:  07/08/17 0411             Specimen:  Blood     Updated:  07/08/17 0451                   Glucose     131 (H)     mg/dL                         BUN     11     mg/dL                         Creatinine     0.98     mg/dL                         Sodium     143     mmol/L                         Potassium     3.5     mmol/L                         Chloride     111 (H)     mmol/L                         CO2     21.0 (L)     mmol/L                         Calcium     8.0 (L)     mg/dL                         eGFR Non African Amer     81     mL/min/1.73                         BUN/Creatinine Ratio     11.2                   Anion Gap     11.0     mmol/L                   Narrative:                GFR Normal >60    Chronic Kidney Disease <60    Kidney Failure <15             CBC & Differential [[id number]]     Collected:  07/08/17 1057             Specimen:  Blood     Updated:  07/08/17 1118             Narrative:                The following orders were created for panel order CBC & Differential.    Procedure                               Abnormality         Status                       ---------                               -----------         ------                       CBC Auto Differential[[id number]]        Abnormal            Final result                       Please view results for these tests on the individual orders.             CBC Auto Differential [[id number]]  (Abnormal)     Collected:  07/08/17 1057             Specimen:  Blood from Arm, Right     Updated:  07/08/17 1118                   WBC     12.30 (H)     10*3/mm3                         RBC     3.49 (L)     10*6/mm3                         Hemoglobin     10.4 (L)     g/dL                         Hematocrit     31.5 (L)     %                         MCV     90.3     fL                         MCH     29.8     pg                         MCHC     33.0     g/dL                         RDW     14.6     %                         RDW-SD     48.3     fl                         MPV     9.8     fL                         Platelets     199     10*3/mm3                         Neutrophil %     71.1     %                         Lymphocyte %     11.7 (L)     %                         Monocyte %     13.4 (H)     %                         Eosinophil %     2.6     %                         Basophil %     0.2     %                         Immature Grans %     1.0     %                         Neutrophils, Absolute     8.75 (H)     10*3/mm3                         Lymphocytes, Absolute     1.44     10*3/mm3                         Monocytes, Absolute     1.65 (H)     10*3/mm3                         Eosinophils, Absolute     0.32     10*3/mm3                         Basophils, Absolute     0.02     10*3/mm3                         Immature Grans, Absolute     0.12 (H)     10*3/mm3                   Blood Gas, Arterial [[id number]]     Collected:  07/09/17 0159             Specimen:  Arterial Blood     Updated:  07/09/17 0203                   Site     Arterial: right radial                   Gera's Test     --                             Documented in Rapid Comm                           pH, Arterial     7.417     pH units                         pCO2, Arterial     36.3     mm Hg                          pO2, Arterial     80.8     mm Hg                         HCO3, Arterial     22.9     mmol/L                         Base Excess, Arterial     -1.1     mmol/L                         O2 Saturation, Arterial     96.2     %                         O2 Saturation Calculated     96.2     %                         Barometric Pressure for Blood Gas     --     mmHg                                   Component not reported at this site.                           Modality     Ventilator                   FIO2     30     %                         Ventilator Mode     Assist                   Rate     10.0     Breaths/minute                         PEEP     5.0                   Vent CPAP/PEEP     5.0             Narrative:                Serial Number: [id number]    : [id number]             Basic Metabolic Panel [[id number]]  (Abnormal)     Collected:  07/09/17 0348             Specimen:  Blood     Updated:  07/09/17 0406                   Glucose     108 (H)     mg/dL                         BUN     11     mg/dL                         Creatinine     0.91     mg/dL                         Sodium     143     mmol/L                         Potassium     3.7     mmol/L                         Chloride     111 (H)     mmol/L                         CO2     22.0 (L)     mmol/L                         Calcium     8.0 (L)     mg/dL                         eGFR Non African Amer     88     mL/min/1.73                         BUN/Creatinine Ratio     12.1                   Anion Gap     10.0     mmol/L                   Narrative:                GFR Normal >60    Chronic Kidney Disease <60    Kidney Failure <15             Urinalysis With / Culture If Indicated [[id number]]  (Abnormal)     Collected:  07/09/17 1100             Specimen:  Urine from Urine, Clean Catch     Updated:  07/09/17 1109                   Color, UA     Dark Yellow (A)                   Appearance, UA     Clear                   pH, UA     5.5                    Specific Gravity, UA     1.022                   Glucose, UA     Negative                   Ketones, UA     Negative                   Bilirubin, UA     Negative                   Blood, UA     Negative                   Protein, UA     Negative                   Leuk Esterase, UA     Negative                   Nitrite, UA     Negative                   Urobilinogen, UA     1.0 E.U./dL             Narrative:                Urine microscopic not indicated.             Blood Gas, Arterial [[id number]]  (Abnormal)     Collected:  07/10/17 0255             Specimen:  Arterial Blood     Updated:  07/10/17 0259                   Site     Arterial: right radial                   Gera's Test     --                             Documented in Rapid Comm                           pH, Arterial     7.443     pH units                         pCO2, Arterial     32.9 (L)     mm Hg                         pO2, Arterial     55.9 (L)     mm Hg                         HCO3, Arterial     22.0     mmol/L                         Base Excess, Arterial     -1.2     mmol/L                         O2 Saturation, Arterial     90.6 (L)     %                         O2 Saturation Calculated     90.6 (L)     %                         Barometric Pressure for Blood Gas     --     mmHg                                   Component not reported at this site.                           Modality     Ventilator                   FIO2     30     %                         Rate     10.0     Breaths/minute                         PEEP     5.0                   Vent CPAP/PEEP     5.0             Narrative:                Serial Number: [id number]    : [id number]             Comprehensive Metabolic Panel [[id number]]  (Abnormal)     Collected:  07/10/17 0338             Specimen:  Blood     Updated:  07/10/17 0404                   Glucose     182 (H)     mg/dL                         BUN     13     mg/dL                         Creatinine     0.98     mg/dL                          Sodium     144     mmol/L                         Potassium     4.1     mmol/L                         Chloride     111 (H)     mmol/L                         CO2     24.0     mmol/L                         Calcium     8.6     mg/dL                         Total Protein     6.5     g/dL                         Albumin     3.40 (L)     g/dL                         ALT (SGPT)     42     U/L                         AST (SGOT)     51 (H)     U/L                         Alkaline Phosphatase     155 (H)     U/L                         Total Bilirubin     0.5     mg/dL                         eGFR Non African Amer     81     mL/min/1.73                         Globulin     3.1     gm/dL                         A/G Ratio     1.1     g/dL                         BUN/Creatinine Ratio     13.3                   Anion Gap     9.0     mmol/L                   Respiratory Culture [[id number]]  (Abnormal)  (Susceptibility)     Collected:  07/08/17 0403             Specimen:  Sputum from NT Suction     Updated:  07/10/17 0744                   Respiratory Culture     Heavy growth (4+) Staphylococcus aureus (A)                   BETA LACTAMASE     Positive                   Gram Stain Result     Greater than 25 WBCs per low power field                         Many (4+) Mixed gram positive evelio             Narrative:                For Staphylococcus, penicillin-resistant, oxacillin-susceptible strains are resistant to B-lactamase labile penicillins but susceptible to other B-lactamase stable penicillins, B-lactamase inhibitor combinations, relavant cephems, and carbapenems.                               Susceptibility                                  Staphylococcus aureus                   [place]                   Clindamycin     <=0.25 ug/ml     Susceptible                   Erythromycin     <=0.25 ug/ml     Susceptible                   Gentamicin     <=0.5 ug/ml     Susceptible                   Inducible  Clindamycin Resistance     NEG      Negative                   Levofloxacin     <=0.12 ug/ml     Susceptible  [1]                    Oxacillin     0.5 ug/ml     Susceptible                   Penicillin G     >=0.5 ug/ml     Resistant                   Tetracycline     <=1 ug/ml     Susceptible                   Trimethoprim + Sulfamethoxazole     <=10 ug/ml     Susceptible                   Vancomycin     <=0.5 ug/ml     Susceptible                                                         [1]            Staphylococcus species may develop resistance during prolonged therapy with quinolones. Isolates that are initially susceptible may become resistant within three to four days after initiation of therapy. Testing of repeat isolates may be warranted.                                                Susceptibility Comments                       Staphylococcus aureus                           This isolate does not demonstrate inducible clindamycin resistance in vitro.                                           Vancomycin, Trough [[id number]]  (Normal)     Collected:  07/10/17 0830             Specimen:  Blood     Updated:  07/10/17 0906                   Vancomycin Trough     12.99     mcg/mL                   Basic Metabolic Panel [[id number]]  (Abnormal)     Collected:  07/11/17 0142             Specimen:  Blood     Updated:  07/11/17 0222                   Glucose     143 (H)     mg/dL                         BUN     21     mg/dL                         Creatinine     1.07     mg/dL                         Sodium     147 (H)     mmol/L                         Potassium     3.7     mmol/L                         Chloride     109     mmol/L                         CO2     25.0     mmol/L                         Calcium     8.9     mg/dL                         eGFR Non African Amer     73     mL/min/1.73                         BUN/Creatinine Ratio     19.6                   Anion Gap     13.0     mmol/L                    Narrative:                GFR Normal >60    Chronic Kidney Disease <60    Kidney Failure <15             Blood Gas, Arterial [[id number]]  (Abnormal)     Collected:  07/11/17 0327             Specimen:  Arterial Blood     Updated:  07/11/17 0330                   Site     Arterial: right radial                   Gera's Test     --                             Documented in Rapid Comm                           pH, Arterial     7.495 (H)     pH units                         pCO2, Arterial     31.1 (L)     mm Hg                         pO2, Arterial     76.1 (L)     mm Hg                         HCO3, Arterial     23.4     mmol/L                         Base Excess, Arterial     1.1     mmol/L                         O2 Saturation, Arterial     96.3     %                         O2 Saturation Calculated     96.3     %                         Barometric Pressure for Blood Gas     --     mmHg                                   Component not reported at this site.                           Modality     Ventilator                   FIO2     40     %                         Ventilator Mode     AC                   Rate     10.0     Breaths/minute                         PEEP     10.0                   Vent CPAP/PEEP     10.0             Narrative:                Serial Number: [id number]    : [id number]             Blood Gas, Arterial [[id number]]  (Abnormal)     Collected:  07/12/17 0310             Specimen:  Arterial Blood     Updated:  07/12/17 0314                   Site     Arterial: right radial                   Gera's Test     --                             Documented in Rapid Comm                           pH, Arterial     7.498 (H)     pH units                         pCO2, Arterial     36.6     mm Hg                         pO2, Arterial     76.8 (L)     mm Hg                         HCO3, Arterial     27.8 (H)     mmol/L                         Base Excess, Arterial     4.6 (H)     mmol/L                          O2 Saturation, Arterial     96.4     %                         O2 Saturation Calculated     96.4     %                         Barometric Pressure for Blood Gas     --     mmHg                                   Component not reported at this site.                           Modality     Ventilator                   FIO2     40     %                         Ventilator Mode     AC                   Rate     10.0     Breaths/minute                         PEEP     10.0                   Vent CPAP/PEEP     10.0             Narrative:                Serial Number: [id number]    : [id number]             Basic Metabolic Panel [[id number]]  (Abnormal)     Collected:  07/12/17 0226             Specimen:  Blood     Updated:  07/12/17 0334                   Glucose     147 (H)     mg/dL                         BUN     29 (H)     mg/dL                         Creatinine     1.06     mg/dL                         Sodium     147 (H)     mmol/L                         Potassium     3.3 (L)     mmol/L                         Chloride     106     mmol/L                         CO2     28.0     mmol/L                         Calcium     9.0     mg/dL                         eGFR Non African Amer     74     mL/min/1.73                         BUN/Creatinine Ratio     27.4 (H)                   Anion Gap     13.0     mmol/L                   Narrative:                GFR Normal >60    Chronic Kidney Disease <60    Kidney Failure <15             CBC & Differential [[id number]]     Collected:  07/12/17 0226             Specimen:  Blood     Updated:  07/12/17 0416             Narrative:                The following orders were created for panel order CBC & Differential.    Procedure                               Abnormality         Status                       ---------                               -----------         ------                       Manual Differential[[id number]]          Abnormal            Final result                  Scan Slide[[id number]]                                       Final result                 CBC Auto Differential[[id number]]        Abnormal            Final result                      Please view results for these tests on the individual orders.             CBC Auto Differential [[id number]]  (Abnormal)     Collected:  07/12/17 0226             Specimen:  Blood     Updated:  07/12/17 0416                   WBC     16.64 (H)     10*3/mm3                         RBC     3.49 (L)     10*6/mm3                         Hemoglobin     10.4 (L)     g/dL                         Hematocrit     31.3 (L)     %                         MCV     89.7     fL                         MCH     29.8     pg                         MCHC     33.2     g/dL                         RDW     14.7     %                         RDW-SD     47.8     fl                         MPV     9.6     fL                         Platelets     349     10*3/mm3                   Scan Slide [[id number]]     Collected:  07/12/17 0226             Specimen:  Blood     Updated:  07/12/17 0416                   Scan Slide     --                             See Manual Differential Results                     Manual Differential [[id number]]  (Abnormal)     Collected:  07/12/17 0226             Specimen:  Blood     Updated:  07/12/17 0416                   Neutrophil %     61.0     %                         Lymphocyte %     23.0     %                         Monocyte %     3.0 (L)     %                         Bands %      7.0     %                         Metamyelocyte %     2.0 (H)     %                         Myelocyte %     3.0 (H)     %                         Atypical Lymphocyte %     1.0     %                         Neutrophils Absolute     11.32 (H)     10*3/mm3                         Lymphocytes Absolute     3.83     10*3/mm3                         Monocytes Absolute     0.50     10*3/mm3                         Hypochromia     Slight/1+                    WBC Morphology     Normal                   Platelet Morphology     Normal             Blood Gas, Arterial [[id number]]  (Abnormal)     Collected:  07/13/17 0317             Specimen:  Arterial Blood     Updated:  07/13/17 0320                   Site     Arterial: right radial                   [facility]era's Test     --                             Documented in [facility]                           pH, Arterial     7.441     pH units                         pCO2, Arterial     38.8     mm Hg                         pO2, Arterial     105.3 (H)     mm Hg                         HCO3, Arterial     25.8     mmol/L                         Base Excess, Arterial     1.8     mmol/L                         O2 Saturation, Arterial     98.0     %                         O2 Saturation Calculated     98.0     %                         Barometric Pressure for Blood Gas     --     mmHg                                   Component not reported at this site.                           Modality     Ventilator                   FIO2     40     %                         Ventilator Mode     AC                   Rate     10.0     Breaths/minute                         PEEP     10.0                   Vent CPAP/PEEP     10.0             Narrative:                Serial Number: [id number]    : [id number]             Blood Gas, Arterial [[id number]]  (Abnormal)     Collected:  07/14/17 0225             Specimen:  Arterial Blood     Updated:  07/14/17 0229                   Site     Arterial: right radial                   [facility]era's Test     --                             Documented in [facility]                           pH, Arterial     7.444     pH units                         pCO2, Arterial     33.7 (L)     mm Hg                         pO2, Arterial     75.2 (L)     mm Hg                         HCO3, Arterial     22.6     mmol/L                         Base Excess, Arterial     -0.7     mmol/L                         O2 Saturation, Arterial      95.7     %                         O2 Saturation Calculated     95.7     %                         Barometric Pressure for Blood Gas     --     mmHg                                   Component not reported at this site.                           Modality     Ventilator                   FIO2     40     %                         Ventilator Mode     Assist                   Rate     10.0     Breaths/minute                         PEEP     8.0                   Vent CPAP/PEEP     8.0             Narrative:                Serial Number: [id number]    : [id number]             CBC & Differential [[id number]]     Collected:  07/14/17 0257             Specimen:  Blood     Updated:  07/14/17 0317             Narrative:                The following orders were created for panel order CBC & Differential.    Procedure                               Abnormality         Status                       ---------                               -----------         ------                       CBC Auto Differential[[id number]]        Abnormal            Final result                      Please view results for these tests on the individual orders.             CBC Auto Differential [[id number]]  (Abnormal)     Collected:  07/14/17 0257             Specimen:  Blood     Updated:  07/14/17 0317                   WBC     16.14 (H)     10*3/mm3                         RBC     3.64 (L)     10*6/mm3                         Hemoglobin     10.9 (L)     g/dL                         Hematocrit     32.8 (L)     %                         MCV     90.1     fL                         MCH     29.9     pg                         MCHC     33.2     g/dL                         RDW     14.5     %                         RDW-SD     47.7     fl                         MPV     9.8     fL                         Platelets     400     10*3/mm3                         Neutrophil %     68.5     %                         Lymphocyte %     18.7     %                          Monocyte %     8.0     %                         Eosinophil %     0.6     %                         Basophil %     0.2     %                         Immature Grans %     4.0     %                         Neutrophils, Absolute     11.04 (H)     10*3/mm3                         Lymphocytes, Absolute     3.02     10*3/mm3                         Monocytes, Absolute     1.29     10*3/mm3                         Eosinophils, Absolute     0.10     10*3/mm3                         Basophils, Absolute     0.04     10*3/mm3                         Immature Grans, Absolute     0.65 (H)     10*3/mm3                   Basic Metabolic Panel [[id number]]  (Abnormal)     Collected:  07/14/17 0257             Specimen:  Blood     Updated:  07/14/17 0337                   Glucose     105 (H)     mg/dL                         BUN     24 (H)     mg/dL                         Creatinine     0.99     mg/dL                         Sodium     147 (H)     mmol/L                         Potassium     2.9 (C)     mmol/L                         Chloride     111 (H)     mmol/L                         CO2     24.0     mmol/L                         Calcium     9.1     mg/dL                         eGFR Non African Amer     80     mL/min/1.73                         BUN/Creatinine Ratio     24.2                   Anion Gap     12.0     mmol/L                   Narrative:                GFR Normal >60    Chronic Kidney Disease <60    Kidney Failure <15             CBC (No Diff) [[id number]]  (Abnormal)     Collected:  07/15/17 0143             Specimen:  Blood     Updated:  07/15/17 0217                   WBC     16.11 (H)     10*3/mm3                         RBC     3.76 (L)     10*6/mm3                         Hemoglobin     11.2 (L)     g/dL                         Hematocrit     33.8 (L)     %                         MCV     89.9     fL                         MCH     29.8     pg                         MCHC     33.1     g/dL                          RDW     14.4     %                         RDW-SD     47.0     fl                         MPV     9.8     fL                         Platelets     366     10*3/mm3                   Blood Gas, Arterial [[id number]]     Collected:  07/15/17 0217             Specimen:  Arterial Blood     Updated:  07/15/17 0223                   Site     Arterial: right radial                   [facility]era's Test     --                             Documented in [facility]                           pH, Arterial     7.435     pH units                         pCO2, Arterial     35.4     mm Hg                         pO2, Arterial     85.1     mm Hg                         HCO3, Arterial     23.2     mmol/L                         Base Excess, Arterial     -0.4     mmol/L                         O2 Saturation, Arterial     96.8     %                         O2 Saturation Calculated     96.8     %                         Barometric Pressure for Blood Gas     --     mmHg                                   Component not reported at this site.                           Modality     Cool Aerosol                   Flow Rate     35.00     lpm                   Narrative:                Serial Number: [id number]    : [id number]             Basic Metabolic Panel [[id number]]  (Abnormal)     Collected:  07/15/17 0143             Specimen:  Blood     Updated:  07/15/17 0227                   Glucose     93     mg/dL                         BUN     18     mg/dL                         Creatinine     0.90     mg/dL                         Sodium     146 (H)     mmol/L                         Potassium     3.4 (L)     mmol/L                         Chloride     110     mmol/L                         CO2     25.0     mmol/L                         Calcium     8.8     mg/dL                         eGFR Non African Amer     89     mL/min/1.73                         BUN/Creatinine Ratio     20.0                   Anion Gap     11.0     mmol/L                    Narrative:                GFR Normal >60    Chronic Kidney Disease <60    Kidney Failure <15             Magnesium [[id number]]  (Normal)     Collected:  07/15/17 0143             Specimen:  Blood     Updated:  07/15/17 0227                   Magnesium     2.1     mg/dL                   CBC (No Diff) [[id number]]  (Abnormal)     Collected:  07/16/17 0230             Specimen:  Blood     Updated:  07/16/17 0253                   WBC     19.16 (H)     10*3/mm3                         RBC     3.84 (L)     10*6/mm3                         Hemoglobin     11.3 (L)     g/dL                         Hematocrit     34.0 (L)     %                         MCV     88.5     fL                         MCH     29.4     pg                         MCHC     33.2     g/dL                         RDW     14.4     %                         RDW-SD     46.8     fl                         MPV     9.8     fL                         Platelets     357     10*3/mm3                   Basic Metabolic Panel [[id number]]  (Abnormal)     Collected:  07/16/17 0230             Specimen:  Blood     Updated:  07/16/17 0305                   Glucose     131 (H)     mg/dL                         BUN     18     mg/dL                         Creatinine     0.95     mg/dL                         Sodium     143     mmol/L                         Potassium     3.6     mmol/L                         Chloride     110     mmol/L                         CO2     23.0 (L)     mmol/L                         Calcium     8.8     mg/dL                         eGFR Non African Amer     84     mL/min/1.73                         BUN/Creatinine Ratio     18.9                   Anion Gap     10.0     mmol/L                   Narrative:                GFR Normal >60    Chronic Kidney Disease <60    Kidney Failure <15             CBC (No Diff) [[id number]]  (Abnormal)     Collected:  07/17/17 0343             Specimen:  Blood     Updated:  07/17/17 0443                    WBC     23.30 (H)     10*3/mm3                         RBC     3.05 (L)     10*6/mm3                         Hemoglobin     9.2 (L)     g/dL                         Hematocrit     27.4 (L)     %                         MCV     89.8     fL                         MCH     30.2     pg                         MCHC     33.6     g/dL                         RDW     14.7     %                         RDW-SD     48.4     fl                         MPV     10.2     fL                         Platelets     493 (H)     10*3/mm3                   Basic Metabolic Panel [[id number]]  (Abnormal)     Collected:  07/17/17 0343             Specimen:  Blood     Updated:  07/17/17 0513                   Glucose     86     mg/dL                         BUN     17     mg/dL                                   Specimen hemolyzed. Results may be affected.                           Creatinine     0.95     mg/dL                         Sodium     142     mmol/L                         Potassium     3.9     mmol/L                                   Specimen hemolyzed.  Results may be affected.                           Chloride     109     mmol/L                         CO2     22.0 (L)     mmol/L                         Calcium     9.3     mg/dL                         eGFR Non African Amer     84     mL/min/1.73                         BUN/Creatinine Ratio     17.9                   Anion Gap     11.0     mmol/L                   Narrative:                GFR Normal >60    Chronic Kidney Disease <60    Kidney Failure <15             CBC & Differential [[id number]]     Collected:  07/18/17 0510             Specimen:  Blood     Updated:  07/18/17 0551             Narrative:                The following orders were created for panel order CBC & Differential.    Procedure                               Abnormality         Status                       ---------                               -----------         ------                       CBC Auto  Differential[[id number]]        Abnormal            Final result                      Please view results for these tests on the individual orders.             CBC Auto Differential [[id number]]  (Abnormal)     Collected:  07/18/17 0510             Specimen:  Blood     Updated:  07/18/17 0551                   WBC     14.20 (H)     10*3/mm3                         RBC     4.00 (L)     10*6/mm3                         Hemoglobin     11.8 (L)     g/dL                         Hematocrit     36.0 (L)     %                         MCV     90.0     fL                         MCH     29.5     pg                         MCHC     32.8 (L)     g/dL                         RDW     14.4     %                         RDW-SD     47.6     fl                         MPV     10.4     fL                         Platelets     413 (H)     10*3/mm3                         Neutrophil %     73.2     %                         Lymphocyte %     13.7 (L)     %                         Monocyte %     9.9     %                         Eosinophil %     2.3     %                         Basophil %     0.2     %                         Immature Grans %     0.7     %                         Neutrophils, Absolute     10.41 (H)     10*3/mm3                         Lymphocytes, Absolute     1.94     10*3/mm3                         Monocytes, Absolute     1.40 (H)     10*3/mm3                         Eosinophils, Absolute     0.32     10*3/mm3                         Basophils, Absolute     0.03     10*3/mm3                         Immature Grans, Absolute     0.10 (H)     10*3/mm3                   Basic Metabolic Panel [[id number]]  (Abnormal)     Collected:  07/18/17 0510             Specimen:  Blood     Updated:  07/18/17 0600                   Glucose     85     mg/dL                         BUN     22 (H)     mg/dL                         Creatinine     1.17     mg/dL                         Sodium     144     mmol/L                          Potassium     3.9     mmol/L                         Chloride     108     mmol/L                         CO2     22.0 (L)     mmol/L                         Calcium     9.7     mg/dL                         eGFR Non African Amer     66     mL/min/1.73                         BUN/Creatinine Ratio     18.8                   Anion Gap     14.0 (H)     mmol/L                   Narrative:                GFR Normal >60    Chronic Kidney Disease <60    Kidney Failure <15             Uric Acid [[id number]]  (Normal)     Collected:  07/18/17 0510             Specimen:  Blood     Updated:  07/18/17 0751                   Uric Acid     7.1     mg/dL                   CBC & Differential [[id number]]     Collected:  07/19/17 0550             Specimen:  Blood     Updated:  07/19/17 0557             Narrative:                The following orders were created for panel order CBC & Differential.    Procedure                               Abnormality         Status                       ---------                               -----------         ------                       CBC Auto Differential[[id number]]        Abnormal            Final result                      Please view results for these tests on the individual orders.             CBC Auto Differential [[id number]]  (Abnormal)     Collected:  07/19/17 0550             Specimen:  Blood     Updated:  07/19/17 0557                   WBC     8.50     10*3/mm3                         RBC     3.55 (L)     10*6/mm3                         Hemoglobin     10.3 (L)     g/dL                         Hematocrit     32.0 (L)     %                         MCV     90.1     fL                         MCH     29.0     pg                         MCHC     32.2 (L)     g/dL                         RDW     14.5     %                         RDW-SD     47.8     fl                         MPV     9.9     fL                         Platelets     357     10*3/mm3                         Neutrophil %      61.1     %                         Lymphocyte %     19.8     %                         Monocyte %     13.9 (H)     %                         Eosinophil %     4.0     %                         Basophil %     0.4     %                         Immature Grans %     0.8     %                         Neutrophils, Absolute     5.20     10*3/mm3                         Lymphocytes, Absolute     1.68     10*3/mm3                         Monocytes, Absolute     1.18     10*3/mm3                         Eosinophils, Absolute     0.34     10*3/mm3                         Basophils, Absolute     0.03     10*3/mm3                         Immature Grans, Absolute     0.07 (H)     10*3/mm3                   Basic Metabolic Panel [[id number]]  (Abnormal)     Collected:  07/19/17 0550             Specimen:  Blood     Updated:  07/19/17 0618                   Glucose     87     mg/dL                         BUN     21     mg/dL                         Creatinine     1.10     mg/dL                         Sodium     144     mmol/L                         Potassium     4.4     mmol/L                         Chloride     112 (H)     mmol/L                         CO2     20.0 (L)     mmol/L                         Calcium     9.2     mg/dL                         eGFR Non African Amer     71     mL/min/1.73                         BUN/Creatinine Ratio     19.1                   Anion Gap     12.0     mmol/L                   Narrative:                GFR Normal >60    Chronic Kidney Disease <60    Kidney Failure <15                     Hospital Course:    The patient is a 52 y.o. male who presented to [facility] with Complaints of a seizure and elevated blood pressure.  He also complained of his tongue have been swelling prior to the seizure activity and then additionally biting his tongue.  He was found to have posterior reversible encephalopathy syndrome on an MRI done in the emergency department.  He was admitted to the  intensive care unit and neurology consulted as well.  We were initially very tactful about his blood pressure control.  We did not want him to strictly to because of his PRES and malignant hypertension.  Dr. Campos placed him on Keppra.  The patient on my first day with him, the day of admission, denied any significant alcohol intake.  He told me that he had a few beers on occasion.  However, by hospital day #2 the patient was tremulous and displaying signs of alcohol withdrawal.  His daughter did endorse that he indeed did drink significantly more alcohol than what he had admitted to.  He was placed on scheduled Valium and as needed Ativan.  Unfortunately, he became increasingly combative and agitated.  He developed delirium tremens.  Dr. Campos and I felt that we had no choice but to place him on ventilatory support to get him through his withdrawal period.          His intensive care course was complicated by possible ventilator associated pneumonia, Clostridium difficile colitis, and he eventually required tracheostomy placement to wean from ventilatory support.  He was fortunately able to do this and ultimately rebounded very nicely after coming off of ventilatory support late in the evening of July 14th. He is now on room air and doing very well with his tracheostomy. ENT hopefully can consider decannulation in the near future.  They will see him as an outpatient setting.  He has been capping his trach and also using a Passy-Ceci valve.         His blood pressure is much improved over last 48-72 hours. Still a few hypertensive numbers, but overall doing much better. Changed metoprolol to carvedilol on 7/14. Placed on losartan on 7/14 and will increase today. There was some question as to whether or not he had tongue swelling on lisinopril. However, angiotensin receptor blockers should be okay as he needs these for blood pressure control. Continue amlodipine, Change clonidine patch back to oral at this point.        "    He will remain on Keppra for at least 6-8 weeks per the neurology service.  I will decrease his dose to 500 mg twice per day for now and continue.  He will follow-up with neurology in 3 weeks and have an outpatient MRI.  Also instructed he and his family that he is not to drive for 90 days post his seizure          He had been on broad-spectrum antibiotics for potential pneumonia since the 11th. The Staph in his sputum was MSSA. Discontinued vancomycin on 7/14. Discontinued Zosyn 7/16. Levaquin discontinued on 7/18, received 7 days. He is also completing Flagyl Clostridium difficile toxin present in his stool. Today is day 13, will treat for 14. No recent loose stools. His white blood cell count has finally normalized today. No signs of ongoing infections and no fevers.           Placed on indomethacin and colchicine on 7/17 for gout.  He has done very well with this and at this point in time can start taking them on an as-needed basis.  He is going to discuss with primary care next week about when to reinitiate allopurinol.  His uric acid was 7.1.  He knows to not take allopurinol in the acute phase of acute gout flare.          Speech therapy cleared for regular foods with thin liquids after a dysphagia study on 7/17.  He did well with physical and occupational therapy.  At this point in time, he is ambulatory and able to conduct all of his own activities of daily living.  For safety purposes, he is going to be staying with his mother for at least the first future.         Condition on Discharge: Good.          Physical Exam on Discharge:    /86  Pulse 96  Temp 97.2 °F (36.2 °C)  Resp 20  Ht 67\" (170.2 cm)  Wt 215 lb 8 oz (97.8 kg)  SpO2 99%  BMI 33.75 kg/m2    Physical Exam    Constitutional: No distress.   Seen and discussed with his nurse, Elin. His sister and mother are present. He is calm, polite, conversant. PICC has been removed.      Head: Normocephalic and atraumatic.     Eyes: Pupils " are equal, round, and reactive to light.     Neck: Neck supple. No JVD present. Trach clean and with PMV.     Cardiovascular: Normal rate and regular rhythm.     Pulmonary/Chest: Effort normal and breath sounds normal. On room air via trach.      Abdominal: Soft. Bowel sounds are normal.   Musculoskeletal: He exhibits edema (trace).  Improved calor and erythema of the left great toe at the MTP joint.   Neurological: He is appropriate. Good strength. Ambulatory.     Skin: Skin is warm and dry.          Discharge Disposition:    Home to his mother's house.          Discharge Medications:                        Nishant Savage       Home Medication Instructions     EMMETT:[id number]             Printed on:07/19/17 1011         Medication Information                                                                                                                 amLODIPine (NORVASC) 10 MG tablet    Take 1 tablet by mouth Daily.                                                         carvedilol (COREG) 25 MG tablet    Take 1 tablet by mouth Every 12 (Twelve) Hours.                                                         cloNIDine (CATAPRES) 0.1 MG tablet    Take 0.1 mg by mouth every night.                                                         colchicine 0.6 MG tablet    Take 1 tablet by mouth Every 12 (Twelve) Hours As Needed for Muscle / Joint Pain.                                                         fenofibrate (TRICOR) 145 MG tablet    Take 1 tablet by mouth Every Night.                                                         HYDROcodone-acetaminophen (NORCO) 5-325 MG per tablet    Take 1 tablet by mouth Every 4 (Four) Hours As Needed for Moderate Pain  for up to 3 days.                                                         indomethacin (INDOCIN) 50 MG capsule    Take 1 capsule by mouth 2 (Two) Times a Day As Needed for Mild Pain . Do not take for greater then 3-5 days.                                                          levETIRAcetam (KEPPRA) 500 MG tablet    Take 1 tablet by mouth Every 12 (Twelve) Hours.                                                         losartan (COZAAR) 100 MG tablet    Take 1 tablet by mouth Daily.                                                         metroNIDAZOLE (FLAGYL) 500 MG tablet    Take 1 tablet by mouth Every 8 (Eight) Hours. Indications: Infection Within the Abdomen                                                         vardenafil (LEVITRA) 10 MG tablet    Take 1 tablet by mouth Daily As Needed for erectile dysfunction.                                                              Discharge Diet:           Diet Instructions                Diet: Regular; Thin Liquids, No Restrictions                    Discharge Diet:  Regular       Fluid Consistency:  Thin Liquids, No Restrictions                                            Activity at Discharge:           Activity Instructions                Activity as Tolerated                              Other Instructions (Specify)                    No driving for 90 days.                                            Follow-up Appointments:     1.  EPI [facility] in 1 week.    2.  Dr. Pierson to be determined.     3.  EPI [facility]ardo in 3 weeks.         Test Results Pending at Discharge: None.         Jairon Perez DO    07/19/17    10:19 AM         Time: 40 minutes.                                                                           Discharge Order     Start     Ordered    07/19/17 1017  Discharge patient  Once     Expected Discharge Date:  07/19/17    Discharge Disposition:  Home or Self Care        07/19/17 1018

## 2017-07-19 NOTE — PROGRESS NOTES
[facility] Progress Note:       LOS: 15 days   Patient Care Team:  Linda Hoffman DNP, EPI as PCP - General  Linda Hoffman DNP, APRN as PCP - Family Medicine    Active consulting complaint: Angioedema, improving    Subjective     Interval History:     Status of active consulting problem: Postop day 7 tracheostomy.  He is doing very well. He states he is breathing well and denies shortness of breath, stridor, or stertor while trach capped. He reports he completed overnight pulse ox without difficulty. His voice is strong. His tongue swelling continues to improve. No dysphagia. Tolerating regular diet with thin liquids.     History taken from: patient    Review of Systems:    Review of Systems   Constitutional: Negative for chills and fever.   HENT: Negative for trouble swallowing.         Tongue swelling decreased   Respiratory: Negative for choking, shortness of breath and stridor.    Musculoskeletal: Negative for neck pain.       Objective     Vital Signs  Temp:  [97.2 °F (36.2 °C)-99 °F (37.2 °C)] 97.2 °F (36.2 °C)  Heart Rate:  [85-96] 96  Resp:  [18-20] 20  BP: (116-146)/(65-86) 146/86    Physical Exam:   Physical Exam   Constitutional: He is oriented to person, place, and time. He appears well-developed and well-nourished. He is cooperative. No distress.   HENT:   Head: Normocephalic and atraumatic.   Right Ear: External ear normal.   Left Ear: External ear normal.   Nose: Nose normal.   Mouth/Throat: Oropharynx is clear and moist.   Tongue swelling almost resolved. Tongue fits in the dental arch well. Tongue lacerations healing well.    Eyes: EOM are normal.   Neck: Neck supple.   Trach is capped and in place and secure. Voicing well   Pulmonary/Chest: Effort normal. No respiratory distress.   On room air   Neurological: He is alert and oriented to person, place, and time. No cranial nerve deficit.   Psychiatric: He has a normal mood and affect. His behavior is normal.        Results Review:        Lab Results (last 24 hours)     Procedure Component Value Units Date/Time    CBC & Differential [[id number]] Collected:  07/19/17 0550    Specimen:  Blood Updated:  07/19/17 0557    Narrative:       The following orders were created for panel order CBC & Differential.  Procedure                               Abnormality         Status                     ---------                               -----------         ------                     CBC Auto Differential[[id number]]        Abnormal            Final result                 Please view results for these tests on the individual orders.    CBC Auto Differential [[id number]]  (Abnormal) Collected:  07/19/17 0550    Specimen:  Blood Updated:  07/19/17 0557     WBC 8.50 10*3/mm3      RBC 3.55 (L) 10*6/mm3      Hemoglobin 10.3 (L) g/dL      Hematocrit 32.0 (L) %      MCV 90.1 fL      MCH 29.0 pg      MCHC 32.2 (L) g/dL      RDW 14.5 %      RDW-SD 47.8 fl      MPV 9.9 fL      Platelets 357 10*3/mm3      Neutrophil % 61.1 %      Lymphocyte % 19.8 %      Monocyte % 13.9 (H) %      Eosinophil % 4.0 %      Basophil % 0.4 %      Immature Grans % 0.8 %      Neutrophils, Absolute 5.20 10*3/mm3      Lymphocytes, Absolute 1.68 10*3/mm3      Monocytes, Absolute 1.18 10*3/mm3      Eosinophils, Absolute 0.34 10*3/mm3      Basophils, Absolute 0.03 10*3/mm3      Immature Grans, Absolute 0.07 (H) 10*3/mm3     Basic Metabolic Panel [[id number]]  (Abnormal) Collected:  07/19/17 0550    Specimen:  Blood Updated:  07/19/17 0618     Glucose 87 mg/dL      BUN 21 mg/dL      Creatinine 1.10 mg/dL      Sodium 144 mmol/L      Potassium 4.4 mmol/L      Chloride 112 (H) mmol/L      CO2 20.0 (L) mmol/L      Calcium 9.2 mg/dL      eGFR Non African Amer 71 mL/min/1.73      BUN/Creatinine Ratio 19.1     Anion Gap 12.0 mmol/L     Narrative:       GFR Normal >60  Chronic Kidney Disease <60  Kidney Failure <15        Imaging Results (last 24 hours)     ** No results found for the last 24 hours. **           Medication Review:     Current Facility-Administered Medications   Medication Dose Route Frequency Provider Last Rate Last Dose   • acetaminophen (TYLENOL) tablet 650 mg  650 mg Oral Q4H PRN Jairon Perez DO   650 mg at 07/09/17 1003   • albuterol (PROVENTIL) nebulizer solution 0.083% 2.5 mg/3mL  2.5 mg Nebulization Q6H PRN Jairon Perez DO       • amLODIPine (NORVASC) tablet 10 mg  10 mg Per G Tube Q24H Cristino Encinas DO   10 mg at 07/19/17 0843   • carvedilol (COREG) tablet 25 mg  25 mg Oral Q12H Jairon Perez DO   25 mg at 07/19/17 0843   • CloNIDine (CATAPRES-TTS) 0.3 MG/24HR patch 1 patch  1 patch Transdermal Weekly Cristino Encinas DO   1 patch at 07/18/17 0830   • colchicine tablet 0.6 mg  0.6 mg Oral Q12H Jairon Perez DO   0.6 mg at 07/19/17 0843   • diazePAM (VALIUM) tablet 2 mg  2 mg Oral Q12H Jairon Perez DO   2 mg at 07/19/17 0844   • enoxaparin (LOVENOX) syringe 40 mg  40 mg Subcutaneous Daily Cristino Encinas DO   40 mg at 07/19/17 0844   • famotidine (PEPCID) tablet 20 mg  20 mg Per G Tube Q12H Cristino Encinas DO   20 mg at 07/19/17 0844   • flintstones complete (FLINTSTONES) chewable tablet 1 tablet  1 tablet Oral Daily Jairon Perez DO   1 tablet at 07/18/17 1253   • hydrALAZINE (APRESOLINE) injection 10 mg  10 mg Intravenous Q6H PRN Jairon Perez DO   10 mg at 07/15/17 1058   • HYDROcodone-acetaminophen (NORCO)  MG per tablet 1 tablet  1 tablet Oral Q4H PRN Cristino Encinas DO   1 tablet at 07/19/17 0557   • HYDROcodone-acetaminophen (NORCO) 5-325 MG per tablet 1 tablet  1 tablet Oral Q4H PRN Cristino Encinas DO   1 tablet at 07/18/17 1304   • indomethacin SR (INDOCIN SR) CR capsule 75 mg  75 mg Oral BID With Meals Jairon Perez DO   75 mg at 07/19/17 0843   • levETIRAcetam (KEPPRA) tablet 1,000 mg  1,000 mg Oral Q12H Jairon Perez DO   1,000 mg at 07/19/17 0844   • lidocaine-Maalox-diphenhydramine (MIRACLE MOUTHWASH) oral suspension 30 mL  30 mL  Oral 4x Daily PRN Jairon Perez, DO       • LORazepam (ATIVAN) injection 1 mg  1 mg Intravenous Q8H PRN Erwin Glass MD   1 mg at 07/14/17 1307   • losartan (COZAAR) tablet 100 mg  100 mg Oral Q24H Jairon Perez, DO   100 mg at 07/19/17 0843   • metroNIDAZOLE (FLAGYL) tablet 500 mg  500 mg Oral Q8H Jairon Perez, DO   500 mg at 07/19/17 0557   • ondansetron (ZOFRAN) injection 4 mg  4 mg Intravenous Q6H PRN Jairon Perez, DO   4 mg at 07/14/17 1837   • potassium chloride (MICRO-K) CR capsule 40 mEq  40 mEq Oral Daily Jairon Perez DO   40 mEq at 07/19/17 0844   • sodium chloride 0.9 % flush 1-10 mL  1-10 mL Intravenous PRN Jairon Perez DO           Assessment/Plan     Active Problems:    Angio-edema    Seizures    HCAP (healthcare-associated pneumonia)       Continue current tracheostomy care. He is doing well with his tracheostomy capped. Discussed overnight pulse ox results with Dr. Pierson. We will leave the trach in for another 2-3 weeks to ensure he does not have any recurrent angioedema/tongue swelling. Patient is agreeable to this. We will have him follow-up in the office in 2-3 weeks for decannulation.     Beatriz Briggs, APRN  07/19/17  10:16 AM

## 2017-07-19 NOTE — DISCHARGE PLACEMENT REQUEST
"Colette Julien [phone]  Nishant Wahl (52 y.o. Male)     Date of Birth Social Security Number Address Home Phone MRN    1965  [id number] [facility] [facility] [phone] [id number]    Nondenominational Marital Status          [facility] Single       Admission Date Admission Type Admitting Provider Attending Provider Department, Room/Bed    7/3/17 Emergency Jairon Perez DO Hancock, John C, DO [facility] 4C, 479/1    Discharge Date Discharge Disposition Discharge Destination         Home or Self Care             Attending Provider: Jairon Perez DO     Allergies:  Lipitor [Atorvastatin]    Isolation:  Spore   Infection:  C.difficile (07/07/17)   Code Status:  FULL    Ht:  67\" (170.2 cm)   Wt:  215 lb 8 oz (97.8 kg)    Admission Cmt:  None   Principal Problem:  None                Active Insurance as of 7/3/2017     Primary Coverage     Payor Plan Insurance Group Employer/Plan Group    [facility]      Payor Plan Address Payor Plan Phone Number Effective From Effective To    [address] [address]  7/3/2017     [place] [address]       Subscriber Name Subscriber Birth Date Member ID       NISHANT WAHL 1965 [id number]                 Emergency Contacts      (Rel.) Home Phone Work Phone Mobile Phone    Yvette Pollack [phone] -- [phone]               History & Physical      Jairon Perez DO at 7/3/2017  3:39 PM              [facility] Medicine Services  HISTORY AND PHYSICAL    Date of Admission: 7/3/2017  Primary Care Physician: Linda Hoffman, DNP, APRN    Subjective     Chief Complaint: tongue swelling preceded possible seizure activity    History of Present Illness  This is a 52-year-old  gentleman who resides at home in [place], [place].  He sees Linda Hoffman for primary care.  He has a long-standing history of hypertension.  He tells me that this morning he went to " "his father's home to discuss with him the fact that his tongue had been swelling and feeling thick since early this morning.  He tells me that he was speaking with his father and ultimately the next thing that he remembers was having tunnel vision and waking up with EMS there.  His father told EMS that the patient had \"diffuse shaking throughout his body.\"  The patient tells me that he has never had a seizure.  There is no family history of seizure.  He apparently had taken his blood pressure while at his father's home and he had a 212 systolic reading.    His blood pressures typically well-controlled.  He tells me that he has been on lisinopril for \"years.\"  He has never had any problems with tongue swelling with this.  He says that his speech had become thick and he was having difficulty with swallowing.  He ultimately also bit his tongue in the ensuing convulsive episode.  This only complicated matters.  He was given IV Decadron in the emergency department.  He has been referred for admission secondary to escalated hypertension, tongue swelling, and possible new onset seizure.  However, he has been found to have PRES on his MRI and likely had convulsive syncope after an episode of severe hypertension.    Review of Systems   Constitutional: Negative.    HENT: Positive for drooling. Negative for congestion, facial swelling, sinus pressure, sore throat and trouble swallowing.    Respiratory: Positive for apnea (he has a history of obstructive sleep apnea, but is noncompliant with his device.). Negative for cough, choking, chest tightness, shortness of breath, wheezing and stridor.    Cardiovascular: Positive for leg swelling. Negative for chest pain and palpitations.   Gastrointestinal: Negative.    Genitourinary: Negative.    Musculoskeletal: Negative.    Skin: Negative.    Neurological: Positive for seizures, syncope (likely) and speech difficulty (because of his tongue issue). Negative for dizziness, tremors, " weakness, light-headedness and headaches.   Psychiatric/Behavioral: Negative.      Otherwise complete ROS reviewed and negative except as mentioned in the HPI.    Past Medical History:   Past Medical History:   Diagnosis Date   • Anxiety    • Arthritis    • Gout    • HTN (hypertension)    • Hyperlipidemia      Past Surgical History:History reviewed. No pertinent surgical history.    Social History:  reports that he has been smoking Cigarettes.  He has a 9.90 pack-year smoking history. He does not have any smokeless tobacco history on file. He reports that he drinks about 1.2 oz of alcohol per week  He reports that he does not use illicit drugs.    Family History: family history includes Diabetes in his maternal grandmother and mother; Heart attack in his paternal grandfather; Heart disease in his father; Hypertension in his father and mother; Kidney disease in his sister; No Known Problems in his daughter, maternal grandfather, paternal grandmother, and son.       Allergies:  Allergies   Allergen Reactions   • Lipitor [Atorvastatin] Rash       Medications:  Prior to Admission medications    Medication Sig Start Date End Date Taking? Authorizing Provider   cloNIDine (CATAPRES) 0.1 MG tablet Take 0.1 mg by mouth every night.   Yes Historical Provider, MD   fenofibrate (TRICOR) 145 MG tablet Take 1 tablet by mouth Every Night. 9/30/16  Yes Linda Hoffman DNP, APRN   ibuprofen (ADVIL,MOTRIN) 800 MG tablet Take 800 mg by mouth Every 6 (Six) Hours As Needed for Moderate Pain (4-6).   Yes Historical Provider, MD   indomethacin (INDOCIN) 50 MG capsule Take 1 capsule by mouth 3 (Three) Times a Day As Needed for mild pain (1-3).  Patient taking differently: Take 50 mg by mouth 3 (Three) Times a Day As Needed for Mild Pain (1-3) (gout). 9/30/16  Yes Linda Hoffman DNP, APRN   lisinopril-hydrochlorothiazide (PRINZIDE,ZESTORETIC) 20-25 MG per tablet Take 1 tablet by mouth Daily. 9/30/16  Yes Linda Hoffman DNP, APRN   "  metoprolol tartrate (LOPRESSOR) 50 MG tablet Take 50 mg by mouth Every 12 (Twelve) Hours.   Yes Historical Provider, MD   metoprolol tartrate (LOPRESSOR) 50 MG tablet Take 1 tablet by mouth 2 (Two) Times a Day. 9/30/16 7/3/17 Yes Linda Hoffman DNP, APRN   vardenafil (LEVITRA) 10 MG tablet Take 1 tablet by mouth Daily As Needed for erectile dysfunction. 9/30/16   Linda Hoffman DNP, APRN   allopurinol (ZYLOPRIM) 300 MG tablet Take 1 tablet by mouth Daily. prn 9/30/16 7/3/17  Linda Hoffman DNP, APRN   ibuprofen (ADVIL,MOTRIN) 800 MG tablet Take 1 tablet by mouth Every 6 (Six) Hours As Needed for mild pain (1-3) or moderate pain (4-6).  Patient taking differently: Take 800 mg by mouth Every 6 (Six) Hours As Needed for Moderate Pain (4-6). 9/30/16 7/3/17  Linda Hoffman DNP, APRN       Objective     Vital Signs: /93  Pulse 62  Temp 98 °F (36.7 °C)  Resp 18  Ht 68\" (172.7 cm)  Wt 200 lb (90.7 kg)  SpO2 98%  BMI 30.41 kg/m2  Physical Exam   Constitutional: He is oriented to person, place, and time. He appears well-developed and well-nourished.   HENT:   Head: Normocephalic and atraumatic.   Significant bruising to his tongue as well as a sublingual hematoma.  He does not appear to have any trouble handling secretions at this point in time.   Eyes: Conjunctivae and EOM are normal. Pupils are equal, round, and reactive to light.   Neck: Neck supple. No JVD present. No tracheal deviation present. No thyromegaly present.   Cardiovascular: Normal rate, regular rhythm, normal heart sounds and intact distal pulses.  Exam reveals no gallop and no friction rub.    No murmur heard.  Pulmonary/Chest: Effort normal and breath sounds normal. No stridor. No respiratory distress. He has no wheezes. He has no rales. He exhibits no tenderness.   Abdominal: Soft. Bowel sounds are normal. He exhibits no distension. There is no tenderness. There is no rebound and no guarding.   Musculoskeletal: Normal range " of motion. He exhibits edema (1+). He exhibits no tenderness or deformity.   Lymphadenopathy:     He has no cervical adenopathy.   Neurological: He is alert and oriented to person, place, and time. He displays normal reflexes. No cranial nerve deficit. He exhibits normal muscle tone.   Skin: Skin is warm and dry. No rash noted.   Psychiatric: He has a normal mood and affect. His behavior is normal. Judgment and thought content normal.     Results Reviewed:  Lab Results (last 24 hours)     Procedure Component Value Units Date/Time    CBC & Differential [[id number]] Collected:  07/03/17 0807    Specimen:  Blood Updated:  07/03/17 0832    Narrative:       The following orders were created for panel order CBC & Differential.  Procedure                               Abnormality         Status                     ---------                               -----------         ------                     CBC Auto Differential[[id number]]        Abnormal            Final result                 Please view results for these tests on the individual orders.    CBC Auto Differential [[id number]]  (Abnormal) Collected:  07/03/17 0807    Specimen:  Blood Updated:  07/03/17 0832     WBC 11.95 (H) 10*3/mm3      RBC 3.93 (L) 10*6/mm3      Hemoglobin 11.9 (L) g/dL      Hematocrit 34.1 (L) %      MCV 86.8 fL      MCH 30.3 pg      MCHC 34.9 g/dL      RDW 14.0 %      RDW-SD 44.0 fl      MPV 9.6 fL      Platelets 245 10*3/mm3      Neutrophil % 82.0 (H) %      Lymphocyte % 8.3 (L) %      Monocyte % 8.9 %      Eosinophil % 0.3 %      Basophil % 0.2 %      Immature Grans % 0.3 %      Neutrophils, Absolute 9.81 (H) 10*3/mm3      Lymphocytes, Absolute 0.99 10*3/mm3      Monocytes, Absolute 1.06 10*3/mm3      Eosinophils, Absolute 0.03 10*3/mm3      Basophils, Absolute 0.02 10*3/mm3      Immature Grans, Absolute 0.04 (H) 10*3/mm3     Comprehensive Metabolic Panel [[id number]]  (Abnormal) Collected:  07/03/17 0807    Specimen:  Blood Updated:  07/03/17  0841     Glucose 127 (H) mg/dL      BUN 9 mg/dL      Creatinine 1.19 mg/dL      Sodium 141 mmol/L      Potassium 3.0 (L) mmol/L      Chloride 102 mmol/L      CO2 29.0 mmol/L      Calcium 8.8 mg/dL      Total Protein 6.9 g/dL      Albumin 4.00 g/dL      ALT (SGPT) 39 U/L      AST (SGOT) 72 (H) U/L      Alkaline Phosphatase 69 U/L      Total Bilirubin 0.6 mg/dL      eGFR Non African Amer 64 mL/min/1.73      Globulin 2.9 gm/dL      A/G Ratio 1.4 g/dL      BUN/Creatinine Ratio 7.6     Anion Gap 10.0 mmol/L     Urinalysis With / Culture If Indicated [[id number]]  (Abnormal) Collected:  07/03/17 0834    Specimen:  Urine from Urine, Clean Catch Updated:  07/03/17 0858     Color, UA Yellow     Appearance, UA Clear     pH, UA 7.5     Specific Gravity, UA 1.014     Glucose, UA Negative     Ketones, UA Negative     Bilirubin, UA Negative     Blood, UA Small (1+) (A)     Protein, UA 30 mg/dL (1+) (A)     Leuk Esterase, UA Negative     Nitrite, UA Negative     Urobilinogen, UA 0.2 E.U./dL    Urinalysis, Microscopic Only [[id number]]  (Abnormal) Collected:  07/03/17 0834    Specimen:  Urine from Urine, Clean Catch Updated:  07/03/17 0858     RBC, UA 3-5 (A) /HPF      WBC, UA 0-2 (A) /HPF      Bacteria, UA None Seen /HPF      Squamous Epithelial Cells, UA 0-2 /HPF      Hyaline Casts, UA None Seen /LPF      Methodology Automated Microscopy    Urine Drug Screen [[id number]]  (Normal) Collected:  07/03/17 0834    Specimen:  Urine from Urine, Clean Catch Updated:  07/03/17 0930     Amphetamine Screen, Urine Negative     Barbiturates Screen, Urine Negative     Benzodiazepine Screen, Urine Negative     Cocaine Screen, Urine Negative     Methadone Screen, Urine Negative     Opiate Screen Negative     Phencyclidine (PCP), Urine Negative     THC, Screen, Urine Negative    Narrative:       Negative Thresholds For Drugs Screened in Urine:    Amphetamines          500 ng/ml  Barbiturates          200 ng/ml  Benzodiazepines       200 ng/ml  Cocaine                150 ng/ml  Methadone             150 ng/ml  Opiates               300 ng/ml  Phencyclidine         25 ng/ml  THC                      50 ng/ml    The normal value for all drugs tested is negative. This report includes final unconfirmed screening results.  A positive result by this assay can be, at your request, sent to the Reference Lab for confirmation by gas chromatography. Unconfirmed results must not be used for non-medical purposes, such as employment or legal testing. Clinical consideration should be applied to any drug of abuse test result, particularly when unconfirmed results are used.        Imaging Results (last 24 hours)     Procedure Component Value Units Date/Time    XR Shoulder 2+ View Right [[id number]] Collected:  07/03/17 0913     Updated:  07/03/17 0918    Narrative:       EXAMINATION: XR SHOULDER 2+ VW RIGHT-     7/3/2017 10:02 AM EDT     HISTORY: Right shoulder pain.     Right shoulder, 3 views.     Old healed fracture of the right clavicle at the junction of the mid and  distal one third.  Bony hypertrophy of the distal clavicle with narrowing of the outlet and  increased risk for impingement.  No AC joint separation.     Mild humeral head spurring.     High riding humeral head with the appearance of rotator cuff  insufficiency.     Summary:  1. Bony narrowing of the outlet based on the clavicle.  2. High riding humeral head.  3. MRI correlation recommended to assess the rotator cuff integrity..  This report was finalized on 07/03/2017 09:15 by Dr. Camilo Almodovar MD.    CT Head Without Contrast [[id number]] Collected:  07/03/17 1114     Updated:  07/03/17 1123    Narrative:       EXAMINATION: CT HEAD WO CONTRAST-      7/3/2017 9:43 AM EDT     HISTORY: seizures     In order to have a CT radiation dose as low as reasonably achievable  Automated Exposure Control was utilized for adjustment of the mA and/or  KV according to patient size.     DLP in mGycm= 821.     Ill-defined low density  within both occipital lobes. No mass effect. No  acute hemorrhage.     Normal ventricle size.     No skull fracture is seen.  Clear paranasal sinuses.     Summary:  1. Bilateral occipital lobe hypodensity compatible with subacute  ischemic change. MRI correlation recommended.  2. Report called to Dr. Zamarripa in the emergency room at 11:15 AM.                                   This report was finalized on 07/03/2017 11:20 by Dr. Camilo Almodovar MD.    MRI Brain With & Without Contrast [[id number]] Collected:  07/03/17 1238     Updated:  07/03/17 1306    Narrative:       MRI BRAIN W WO CONTRAST- 7/3/2017 11:57 AM CDT     HISTORY: Ischemic changes; R13.12-Dysphagia, oropharyngeal phase     COMPARISON: None.       TECHNIQUE: Multiplanar imaging of the brain was performed in a routine  fashion before and after the intravenous injection of gadolinium  contrast.     FINDINGS:   Diffusion: No restriction of diffusion to suggest acute ischemia.     Midline structures: Nondisplaced.     Ventricles: Normal in configuration and symmetric in size.     Masses: No masses or mass effect.     Basilar cisterns: Maintained.     Extra axial space: No abnormal extra-axial fluid.     Gray-white matter signal: There is abnormal signal. There is bilateral  subcortical signal on the T2 and FLAIR sequences involving the  cerebellum and posterior occipital and parietal lobes. There is no  associated enhancement. Most likely this is posterior reversible  encephalopathy syndrome. (P RES)     Cerebellum: Abnormal signal is noted in the cerebellum most likely  associated with the pres.     Brainstem: Normal.     Enhancement: No abnormal enhancement.     Other: Proximal cervical spinal cord is normal. Bilateral globes and  orbits are normal in appearance. Normal cerebrovascular flow voids  noted. Mucous cyst is noted in the right maxillary antrum mucosal  thickening is present in the left maxillary antrum       Impression:       1. Posterior reversible  encephalopathy syndrome. Follow-up in 8 weeks is  suggested.         This report was finalized on 07/03/2017 12:53 by Dr. Adolph Echols MD.        I have personally reviewed and interpreted the radiology studies and ECG obtained at time of admission.     Assessment / Plan     Assessment & Plan   1.  Possible angioedema related to lisinopril.  2.  Escalated hypertension.  3.  Possible new onset seizure disorder versus convulsive syncope.  4.  Posterior reversible encephalopathy syndrome on MRI.  5.  Tongue bite with sublingual hematoma.  6.  Tobacco abuse.  7.  Obstructive sleep apnea, noncompliant with device.  8.  Gouty arthritis.    He will be admitted to my service at [facility].  He is placed in the cardiac care unit to follow his blood pressure and also be placed on a Cardene drip.  I will escalate his antihypertensive regimen and obviously hold his lisinopril.  Also wanted to do a 2-D echocardiogram given his edema and difficult to control hypertension.    Consult neurology to evaluate the finding of PRES on his MRI.  I doubt very seriously that he had a true seizure today.  He likely had an episode related to this finding or also possibly convulsive syncope.  I doubt he will need antiepileptic drugs.  I will leave whether or not he needs an EEG to the neurologist.    Consult otolaryngology to evaluate his tongue hematoma and sublingual hematoma.  Hopefully this issue will not worsen.  He also apparently had angioedema that preceded this issue.  Remain on IV Decadron and Pepcid.  Hold his nonsteroidal anti-inflammatory drugs for now as well.  Pain control with oral Norco or IV Dilaudid.    SCDs for DVT prophylaxis.    Code Status: Full.      I discussed the patients findings and my recommendations with the patient, his daughter, and his nurse, Ginny.     Estimated length of stay is at least overnight.     Jairon Perez,    07/03/17   3:39 PM               Electronically signed by Jairon ADAMS  DO Ana at 7/3/2017  4:10 PM        Operative/Procedure Notes (last 24 hours) (Notes from 7/18/2017  1:40 PM through 7/19/2017  1:40 PM)     No notes of this type exist for this encounter.           Physician Progress Notes (last 24 hours) (Notes from 7/18/2017  1:41 PM through 7/19/2017  1:41 PM)      EPI Jain at 7/19/2017  8:56 AM  Version 1 of 1         ENT/[facility]PR[facility] ([facility]) Progress Note:       LOS: 15 days   Patient Care Team:  Linda Hoffman DNP, APRN as PCP - General  Linda Hoffman DNP, APRN as PCP - Family Medicine    Active consulting complaint: Angioedema, improving    Subjective     Interval History:     Status of active consulting problem: Postop day 7 tracheostomy.  He is doing very well. He states he is breathing well and denies shortness of breath, stridor, or stertor while trach capped. He reports he completed overnight pulse ox without difficulty. His voice is strong. His tongue swelling continues to improve. No dysphagia. Tolerating regular diet with thin liquids.     History taken from: patient    Review of Systems:    Review of Systems   Constitutional: Negative for chills and fever.   HENT: Negative for trouble swallowing.         Tongue swelling decreased   Respiratory: Negative for choking, shortness of breath and stridor.    Musculoskeletal: Negative for neck pain.       Objective     Vital Signs  Temp:  [97.2 °F (36.2 °C)-99 °F (37.2 °C)] 97.2 °F (36.2 °C)  Heart Rate:  [85-96] 96  Resp:  [18-20] 20  BP: (116-146)/(65-86) 146/86    Physical Exam:   Physical Exam   Constitutional: He is oriented to person, place, and time. He appears well-developed and well-nourished. He is cooperative. No distress.   HENT:   Head: Normocephalic and atraumatic.   Right Ear: External ear normal.   Left Ear: External ear normal.   Nose: Nose normal.   Mouth/Throat: Oropharynx is clear and moist.   Tongue swelling almost resolved. Tongue fits in the dental arch well. Tongue lacerations  healing well.    Eyes: EOM are normal.   Neck: Neck supple.   Trach is capped and in place and secure. Voicing well   Pulmonary/Chest: Effort normal. No respiratory distress.   On room air   Neurological: He is alert and oriented to person, place, and time. No cranial nerve deficit.   Psychiatric: He has a normal mood and affect. His behavior is normal.        Results Review:       Lab Results (last 24 hours)     Procedure Component Value Units Date/Time    CBC & Differential [[id number]] Collected:  07/19/17 0550    Specimen:  Blood Updated:  07/19/17 0557    Narrative:       The following orders were created for panel order CBC & Differential.  Procedure                               Abnormality         Status                     ---------                               -----------         ------                     CBC Auto Differential[[id number]]        Abnormal            Final result                 Please view results for these tests on the individual orders.    CBC Auto Differential [[id number]]  (Abnormal) Collected:  07/19/17 0550    Specimen:  Blood Updated:  07/19/17 0557     WBC 8.50 10*3/mm3      RBC 3.55 (L) 10*6/mm3      Hemoglobin 10.3 (L) g/dL      Hematocrit 32.0 (L) %      MCV 90.1 fL      MCH 29.0 pg      MCHC 32.2 (L) g/dL      RDW 14.5 %      RDW-SD 47.8 fl      MPV 9.9 fL      Platelets 357 10*3/mm3      Neutrophil % 61.1 %      Lymphocyte % 19.8 %      Monocyte % 13.9 (H) %      Eosinophil % 4.0 %      Basophil % 0.4 %      Immature Grans % 0.8 %      Neutrophils, Absolute 5.20 10*3/mm3      Lymphocytes, Absolute 1.68 10*3/mm3      Monocytes, Absolute 1.18 10*3/mm3      Eosinophils, Absolute 0.34 10*3/mm3      Basophils, Absolute 0.03 10*3/mm3      Immature Grans, Absolute 0.07 (H) 10*3/mm3     Basic Metabolic Panel [[id number]]  (Abnormal) Collected:  07/19/17 0550    Specimen:  Blood Updated:  07/19/17 0618     Glucose 87 mg/dL      BUN 21 mg/dL      Creatinine 1.10 mg/dL      Sodium 144  mmol/L      Potassium 4.4 mmol/L      Chloride 112 (H) mmol/L      CO2 20.0 (L) mmol/L      Calcium 9.2 mg/dL      eGFR Non African Amer 71 mL/min/1.73      BUN/Creatinine Ratio 19.1     Anion Gap 12.0 mmol/L     Narrative:       GFR Normal >60  Chronic Kidney Disease <60  Kidney Failure <15        Imaging Results (last 24 hours)     ** No results found for the last 24 hours. **          Medication Review:     Current Facility-Administered Medications   Medication Dose Route Frequency Provider Last Rate Last Dose   • acetaminophen (TYLENOL) tablet 650 mg  650 mg Oral Q4H PRN Jairon Perez DO   650 mg at 07/09/17 1003   • albuterol (PROVENTIL) nebulizer solution 0.083% 2.5 mg/3mL  2.5 mg Nebulization Q6H PRN Jairon Perez DO       • amLODIPine (NORVASC) tablet 10 mg  10 mg Per G Tube Q24H Cristino Encinas DO   10 mg at 07/19/17 0843   • carvedilol (COREG) tablet 25 mg  25 mg Oral Q12H Jairon Perez DO   25 mg at 07/19/17 0843   • CloNIDine (CATAPRES-TTS) 0.3 MG/24HR patch 1 patch  1 patch Transdermal Weekly Cristino Encinas DO   1 patch at 07/18/17 0830   • colchicine tablet 0.6 mg  0.6 mg Oral Q12H Jairon Perez DO   0.6 mg at 07/19/17 0843   • diazePAM (VALIUM) tablet 2 mg  2 mg Oral Q12H Jairon Perez DO   2 mg at 07/19/17 0844   • enoxaparin (LOVENOX) syringe 40 mg  40 mg Subcutaneous Daily Cristino Encinas DO   40 mg at 07/19/17 0844   • famotidine (PEPCID) tablet 20 mg  20 mg Per G Tube Q12H Cristino Encinas DO   20 mg at 07/19/17 0844   • flintstones complete (FLINTSTONES) chewable tablet 1 tablet  1 tablet Oral Daily Jairon Perez DO   1 tablet at 07/18/17 1253   • hydrALAZINE (APRESOLINE) injection 10 mg  10 mg Intravenous Q6H PRN Jairon Perez DO   10 mg at 07/15/17 1058   • HYDROcodone-acetaminophen (NORCO)  MG per tablet 1 tablet  1 tablet Oral Q4H PRN Cristino Encinas DO   1 tablet at 07/19/17 0557   • HYDROcodone-acetaminophen (NORCO) 5-325 MG per tablet 1 tablet  1  tablet Oral Q4H PRN Cristino Encinas, DO   1 tablet at 07/18/17 1304   • indomethacin SR (INDOCIN SR) CR capsule 75 mg  75 mg Oral BID With Meals Jairon Perez DO   75 mg at 07/19/17 0843   • levETIRAcetam (KEPPRA) tablet 1,000 mg  1,000 mg Oral Q12H Jairon Perez, DO   1,000 mg at 07/19/17 0844   • lidocaine-Maalox-diphenhydramine (MIRACLE MOUTHWASH) oral suspension 30 mL  30 mL Oral 4x Daily PRN Jairon Perez DO       • LORazepam (ATIVAN) injection 1 mg  1 mg Intravenous Q8H PRN Erwin Glass MD   1 mg at 07/14/17 1307   • losartan (COZAAR) tablet 100 mg  100 mg Oral Q24H Jairon Perez, DO   100 mg at 07/19/17 0843   • metroNIDAZOLE (FLAGYL) tablet 500 mg  500 mg Oral Q8H Jairon Perez, DO   500 mg at 07/19/17 0557   • ondansetron (ZOFRAN) injection 4 mg  4 mg Intravenous Q6H PRN Jairon Perez DO   4 mg at 07/14/17 1837   • potassium chloride (MICRO-K) CR capsule 40 mEq  40 mEq Oral Daily Jairon Perez DO   40 mEq at 07/19/17 0844   • sodium chloride 0.9 % flush 1-10 mL  1-10 mL Intravenous PRN Jairon Perez DO           Assessment/Plan     Active Problems:    Angio-edema    Seizures    HCAP (healthcare-associated pneumonia)       Continue current tracheostomy care. He is doing well with his tracheostomy capped. Discussed overnight pulse ox results with Dr. Pierson. We will leave the trach in for another 2-3 weeks to ensure he does not have any recurrent angioedema/tongue swelling. Patient is agreeable to this. We will have him follow-up in the office in 2-3 weeks for decannulation.     EPI Bowie  07/19/17  10:16 AM         Electronically signed by EPI Jain at 7/19/2017 10:23 AM        Consult Notes (last 24 hours) (Notes from 7/18/2017  1:41 PM through 7/19/2017  1:41 PM)     No notes of this type exist for this encounter.        Respiratory Therapy NoteInpatient Consult to Case Management  [UTC238] (Order [id number])   Consult   Date: 7/19/2017 Department:  [address] Released By: Trinity Bryson RN (auto-released) Authorizing: Jairon Pierson MD   Order History   Inpatient   Date/Time Action Taken User Additional Information   07/19/17 1328 Release Trinity Bryson RN (auto-released) From Order: [id number]   Order Details   Frequency Duration Priority Order Class   Once 1  occurrence Routine Hospital Performed   Order Questions   Question Answer Comment   Reason for Consult? SUCTION HEAD; CATHETER; 6 SHILEY; TIES; INTERCANULA AND DRESSING KITS    Verbal Order Info   Action Created on Order Mode Entered by Responsible Provider Signed by Signed on   Ordering 07/19/17 1327 Verbal with readback COMPA Lopez MD     Consult Order Info   ID Description Priority Start Date Start Time   [id number] Inpatient Consult to Case Management  Routine 07/19/2017  1:28 PM   Provider Specialty Referred to   ______________________________________ _____________________________________   Reprint Order Requisition   Inpatient Consult to Case Management  (Order #[id number]) on 7/19/17   Encounter   View Encounter       Order Provider Info       Office phone Pager E-mail   Ordering User Trinity Bryson RN -- -- --   Authorizing Provider Jairon Pierson [phone] -- --   Attending Provider Jairon Perez [phone] -- --   Order Transmittal Tracking   Inpatient Consult to Case Management  (Order #[id number]) on 7/19/17     s (last 24 hours) (Notes from 7/18/2017  1:41 PM through 7/19/2017  1:41 PM)     No notes of this type exist for this encounter.

## 2017-07-19 NOTE — PROGRESS NOTES
Continued Stay Note  [facility]     Patient Name: Nishant Savage  MRN: [id number]  Today's Date: 7/19/2017    Admit Date: 7/3/2017          Discharge Plan       07/19/17 1606    Case Management/Social Work Plan    Plan Home with [facility]    Patient/Family In Agreement With Plan yes    Final Note    Final Note Rec'd orders for home health. Pt has already chosen [facility] so faxed the orders to them at [phone] and called them at [phone]. They will touch base with pt. Checked with [employer] and spoke with Jodi to make sure the trach supplies were covered by insurance and she said Mylene (who Colette was working with) has left until Monday and she did not mention the trach supplies so they were not aware. Refaxed the referral to them at [phone] and they will have the supplies for pt tomorrow. Informed pt's nurse Elin and she has given pt the supplies he needs, and she said he does not have anything to suction. [facility]       07/19/17 1347    Case Management/Social Work Plan    Plan Home with Mother    Additional Comments Pt prefers [employer] for trach supplies that have been ordered. Spoke with Mylene there [phone] and faxed needed information/orders to her at [phone]. They will deliver to pt's home.  RN concerned with no HH care being ordered, left message for Dr Pierson's office.                Discharge Codes     None        Expected Discharge Date and Time     Expected Discharge Date Expected Discharge Time    Jul 19, 2017             FLAKITA Petty

## 2017-07-19 NOTE — DISCHARGE PLACEMENT REQUEST
"[facility]  [phone]    Nishant Wahl (52 y.o. Male)     Date of Birth Social Security Number Address Home Phone MRN    1965  [address] [facility] [id number] [phone] [id number]    Mandaen Marital Status          Hoahaoism Single       Admission Date Admission Type Admitting Provider Attending Provider Department, Room/Bed    7/3/17 Emergency Jairon Perez DO Hancock, John C, DO [address], 479/1    Discharge Date Discharge Disposition Discharge Destination         Home or Self Care             Attending Provider: Jairon Perez DO     Allergies:  Lipitor [Atorvastatin]    Isolation:  Spore   Infection:  C.difficile (17)   Code Status:  FULL    Ht:  67\" (170.2 cm)   Wt:  215 lb 8 oz (97.8 kg)    Admission Cmt:  None   Principal Problem:  None                Active Insurance as of 7/3/2017     Primary Coverage     Payor Plan Insurance Group Employer/Plan Group    [facility] [employer] [facility]      Payor Plan Address Payor Plan Phone Number Effective From Effective To    [address] [address]  7/3/2017     [place] [address]       Subscriber Name Subscriber Birth Date Member ID       NISHANT WAHL 1965 [id number]                 Emergency Contacts      (Rel.) Home Phone Work Phone Mobile Phone    Yvette Pollack [phone] -- [phone]        [address] [address]  Phone:  [phone]  Fax:          Patient:     Nishant Wahl MRN:  [id number]   [address] [facility] [address] :  1965  SSN:    Phone: [phone] Sex:  M      INSURANCE PAYOR PLAN GROUP # SUBSCRIBER ID   [facility] [place] [id number]   [id number]   Admitting Diagnosis: Angio-edema, initial encounter [T78.3XXA]  Order Date:  2017               Inpatient Consult to Case Management        (Order ID: [id number])     Diagnosis:    " "     Priority:  Routine Expected Date:   Expiration Date:        Interval:  Once Count:    Reason for Consult? SUCTION HEAD; CATHETER; 6 SHILEY; TIES; INTERCANULA AND DRESSING KITS     Specimen Type:   Specimen Source:   Specimen Taken Date:   Specimen Taken Time:                         Verbal Order Mode: Verbal with readback   Authorizing Provider: Jairon Pierson MD  Authorizing Provider's NPI: [id number]     Order Entered By: Trinity Bryson RN 7/19/2017  1:27 PM     Electronically signed by:                  History & Physical      Jairon Perez DO at 7/3/2017  3:39 PM              [facility] Medicine Services  HISTORY AND PHYSICAL    Date of Admission: 7/3/2017  Primary Care Physician: Linda Hoffman, DNP, APRN    Subjective     Chief Complaint: tongue swelling preceded possible seizure activity    History of Present Illness  This is a 52-year-old  gentleman who resides at home in [place], [place].  He sees Linda Hoffman for primary care.  He has a long-standing history of hypertension.  He tells me that this morning he went to his father's home to discuss with him the fact that his tongue had been swelling and feeling thick since early this morning.  He tells me that he was speaking with his father and ultimately the next thing that he remembers was having tunnel vision and waking up with EMS there.  His father told EMS that the patient had \"diffuse shaking throughout his body.\"  The patient tells me that he has never had a seizure.  There is no family history of seizure.  He apparently had taken his blood pressure while at his father's home and he had a 212 systolic reading.    His blood pressures typically well-controlled.  He tells me that he has been on lisinopril for \"years.\"  He has never had any problems with tongue swelling with this.  He says that his speech had become thick and he was having difficulty with swallowing.  He ultimately also bit his " tongue in the ensuing convulsive episode.  This only complicated matters.  He was given IV Decadron in the emergency department.  He has been referred for admission secondary to escalated hypertension, tongue swelling, and possible new onset seizure.  However, he has been found to have PRES on his MRI and likely had convulsive syncope after an episode of severe hypertension.    Review of Systems   Constitutional: Negative.    HENT: Positive for drooling. Negative for congestion, facial swelling, sinus pressure, sore throat and trouble swallowing.    Respiratory: Positive for apnea (he has a history of obstructive sleep apnea, but is noncompliant with his device.). Negative for cough, choking, chest tightness, shortness of breath, wheezing and stridor.    Cardiovascular: Positive for leg swelling. Negative for chest pain and palpitations.   Gastrointestinal: Negative.    Genitourinary: Negative.    Musculoskeletal: Negative.    Skin: Negative.    Neurological: Positive for seizures, syncope (likely) and speech difficulty (because of his tongue issue). Negative for dizziness, tremors, weakness, light-headedness and headaches.   Psychiatric/Behavioral: Negative.      Otherwise complete ROS reviewed and negative except as mentioned in the HPI.    Past Medical History:   Past Medical History:   Diagnosis Date   • Anxiety    • Arthritis    • Gout    • HTN (hypertension)    • Hyperlipidemia      Past Surgical History:History reviewed. No pertinent surgical history.    Social History:  reports that he has been smoking Cigarettes.  He has a 9.90 pack-year smoking history. He does not have any smokeless tobacco history on file. He reports that he drinks about 1.2 oz of alcohol per week  He reports that he does not use illicit drugs.    Family History: family history includes Diabetes in his maternal grandmother and mother; Heart attack in his paternal grandfather; Heart disease in his father; Hypertension in his father and  mother; Kidney disease in his sister; No Known Problems in his daughter, maternal grandfather, paternal grandmother, and son.       Allergies:  Allergies   Allergen Reactions   • Lipitor [Atorvastatin] Rash       Medications:  Prior to Admission medications    Medication Sig Start Date End Date Taking? Authorizing Provider   cloNIDine (CATAPRES) 0.1 MG tablet Take 0.1 mg by mouth every night.   Yes Historical Provider, MD   fenofibrate (TRICOR) 145 MG tablet Take 1 tablet by mouth Every Night. 9/30/16  Yes Linda Hoffman DNP, APRN   ibuprofen (ADVIL,MOTRIN) 800 MG tablet Take 800 mg by mouth Every 6 (Six) Hours As Needed for Moderate Pain (4-6).   Yes Historical Provider, MD   indomethacin (INDOCIN) 50 MG capsule Take 1 capsule by mouth 3 (Three) Times a Day As Needed for mild pain (1-3).  Patient taking differently: Take 50 mg by mouth 3 (Three) Times a Day As Needed for Mild Pain (1-3) (gout). 9/30/16  Yes Linda Hoffman DNP, APRN   lisinopril-hydrochlorothiazide (PRINZIDE,ZESTORETIC) 20-25 MG per tablet Take 1 tablet by mouth Daily. 9/30/16  Yes Linda Hoffman DNP, APRN   metoprolol tartrate (LOPRESSOR) 50 MG tablet Take 50 mg by mouth Every 12 (Twelve) Hours.   Yes Historical Provider, MD   metoprolol tartrate (LOPRESSOR) 50 MG tablet Take 1 tablet by mouth 2 (Two) Times a Day. 9/30/16 7/3/17 Yes Linda Hoffman DNP, APRN   vardenafil (LEVITRA) 10 MG tablet Take 1 tablet by mouth Daily As Needed for erectile dysfunction. 9/30/16   Linda Hoffman DNP, APRN   allopurinol (ZYLOPRIM) 300 MG tablet Take 1 tablet by mouth Daily. prn 9/30/16 7/3/17  Linda Hoffman DNP, APRN   ibuprofen (ADVIL,MOTRIN) 800 MG tablet Take 1 tablet by mouth Every 6 (Six) Hours As Needed for mild pain (1-3) or moderate pain (4-6).  Patient taking differently: Take 800 mg by mouth Every 6 (Six) Hours As Needed for Moderate Pain (4-6). 9/30/16 7/3/17  Linda Hoffman, DNP, APRN       Objective     Vital Signs: BP  "177/93  Pulse 62  Temp 98 °F (36.7 °C)  Resp 18  Ht 68\" (172.7 cm)  Wt 200 lb (90.7 kg)  SpO2 98%  BMI 30.41 kg/m2  Physical Exam   Constitutional: He is oriented to person, place, and time. He appears well-developed and well-nourished.   HENT:   Head: Normocephalic and atraumatic.   Significant bruising to his tongue as well as a sublingual hematoma.  He does not appear to have any trouble handling secretions at this point in time.   Eyes: Conjunctivae and EOM are normal. Pupils are equal, round, and reactive to light.   Neck: Neck supple. No JVD present. No tracheal deviation present. No thyromegaly present.   Cardiovascular: Normal rate, regular rhythm, normal heart sounds and intact distal pulses.  Exam reveals no gallop and no friction rub.    No murmur heard.  Pulmonary/Chest: Effort normal and breath sounds normal. No stridor. No respiratory distress. He has no wheezes. He has no rales. He exhibits no tenderness.   Abdominal: Soft. Bowel sounds are normal. He exhibits no distension. There is no tenderness. There is no rebound and no guarding.   Musculoskeletal: Normal range of motion. He exhibits edema (1+). He exhibits no tenderness or deformity.   Lymphadenopathy:     He has no cervical adenopathy.   Neurological: He is alert and oriented to person, place, and time. He displays normal reflexes. No cranial nerve deficit. He exhibits normal muscle tone.   Skin: Skin is warm and dry. No rash noted.   Psychiatric: He has a normal mood and affect. His behavior is normal. Judgment and thought content normal.     Results Reviewed:  Lab Results (last 24 hours)     Procedure Component Value Units Date/Time    CBC & Differential [[id number]] Collected:  07/03/17 0807    Specimen:  Blood Updated:  07/03/17 0832    Narrative:       The following orders were created for panel order CBC & Differential.  Procedure                               Abnormality         Status                     ---------                   "             -----------         ------                     CBC Auto Differential[[id number]]        Abnormal            Final result                 Please view results for these tests on the individual orders.    CBC Auto Differential [[id number]]  (Abnormal) Collected:  07/03/17 0807    Specimen:  Blood Updated:  07/03/17 0832     WBC 11.95 (H) 10*3/mm3      RBC 3.93 (L) 10*6/mm3      Hemoglobin 11.9 (L) g/dL      Hematocrit 34.1 (L) %      MCV 86.8 fL      MCH 30.3 pg      MCHC 34.9 g/dL      RDW 14.0 %      RDW-SD 44.0 fl      MPV 9.6 fL      Platelets 245 10*3/mm3      Neutrophil % 82.0 (H) %      Lymphocyte % 8.3 (L) %      Monocyte % 8.9 %      Eosinophil % 0.3 %      Basophil % 0.2 %      Immature Grans % 0.3 %      Neutrophils, Absolute 9.81 (H) 10*3/mm3      Lymphocytes, Absolute 0.99 10*3/mm3      Monocytes, Absolute 1.06 10*3/mm3      Eosinophils, Absolute 0.03 10*3/mm3      Basophils, Absolute 0.02 10*3/mm3      Immature Grans, Absolute 0.04 (H) 10*3/mm3     Comprehensive Metabolic Panel [[id number]]  (Abnormal) Collected:  07/03/17 0807    Specimen:  Blood Updated:  07/03/17 0841     Glucose 127 (H) mg/dL      BUN 9 mg/dL      Creatinine 1.19 mg/dL      Sodium 141 mmol/L      Potassium 3.0 (L) mmol/L      Chloride 102 mmol/L      CO2 29.0 mmol/L      Calcium 8.8 mg/dL      Total Protein 6.9 g/dL      Albumin 4.00 g/dL      ALT (SGPT) 39 U/L      AST (SGOT) 72 (H) U/L      Alkaline Phosphatase 69 U/L      Total Bilirubin 0.6 mg/dL      eGFR Non African Amer 64 mL/min/1.73      Globulin 2.9 gm/dL      A/G Ratio 1.4 g/dL      BUN/Creatinine Ratio 7.6     Anion Gap 10.0 mmol/L     Urinalysis With / Culture If Indicated [[id number]]  (Abnormal) Collected:  07/03/17 0834    Specimen:  Urine from Urine, Clean Catch Updated:  07/03/17 0858     Color, UA Yellow     Appearance, UA Clear     pH, UA 7.5     Specific Gravity, UA 1.014     Glucose, UA Negative     Ketones, UA Negative     Bilirubin, UA Negative     Blood,  UA Small (1+) (A)     Protein, UA 30 mg/dL (1+) (A)     Leuk Esterase, UA Negative     Nitrite, UA Negative     Urobilinogen, UA 0.2 E.U./dL    Urinalysis, Microscopic Only [[id number]]  (Abnormal) Collected:  07/03/17 0834    Specimen:  Urine from Urine, Clean Catch Updated:  07/03/17 0858     RBC, UA 3-5 (A) /HPF      WBC, UA 0-2 (A) /HPF      Bacteria, UA None Seen /HPF      Squamous Epithelial Cells, UA 0-2 /HPF      Hyaline Casts, UA None Seen /LPF      Methodology Automated Microscopy    Urine Drug Screen [[id number]]  (Normal) Collected:  07/03/17 0834    Specimen:  Urine from Urine, Clean Catch Updated:  07/03/17 0930     Amphetamine Screen, Urine Negative     Barbiturates Screen, Urine Negative     Benzodiazepine Screen, Urine Negative     Cocaine Screen, Urine Negative     Methadone Screen, Urine Negative     Opiate Screen Negative     Phencyclidine (PCP), Urine Negative     THC, Screen, Urine Negative    Narrative:       Negative Thresholds For Drugs Screened in Urine:    Amphetamines          500 ng/ml  Barbiturates          200 ng/ml  Benzodiazepines       200 ng/ml  Cocaine               150 ng/ml  Methadone             150 ng/ml  Opiates               300 ng/ml  Phencyclidine         25 ng/ml  THC                      50 ng/ml    The normal value for all drugs tested is negative. This report includes final unconfirmed screening results.  A positive result by this assay can be, at your request, sent to the Reference Lab for confirmation by gas chromatography. Unconfirmed results must not be used for non-medical purposes, such as employment or legal testing. Clinical consideration should be applied to any drug of abuse test result, particularly when unconfirmed results are used.        Imaging Results (last 24 hours)     Procedure Component Value Units Date/Time    XR Shoulder 2+ View Right [[id number]] Collected:  07/03/17 0913     Updated:  07/03/17 0918    Narrative:       EXAMINATION: XR SHOULDER 2+ VW  RIGHT-     7/3/2017 10:02 AM EDT     HISTORY: Right shoulder pain.     Right shoulder, 3 views.     Old healed fracture of the right clavicle at the junction of the mid and  distal one third.  Bony hypertrophy of the distal clavicle with narrowing of the outlet and  increased risk for impingement.  No AC joint separation.     Mild humeral head spurring.     High riding humeral head with the appearance of rotator cuff  insufficiency.     Summary:  1. Bony narrowing of the outlet based on the clavicle.  2. High riding humeral head.  3. MRI correlation recommended to assess the rotator cuff integrity..  This report was finalized on 07/03/2017 09:15 by Dr. Camilo Almodovar MD.    CT Head Without Contrast [[id number]] Collected:  07/03/17 1114     Updated:  07/03/17 1123    Narrative:       EXAMINATION: CT HEAD WO CONTRAST-      7/3/2017 9:43 AM EDT     HISTORY: seizures     In order to have a CT radiation dose as low as reasonably achievable  Automated Exposure Control was utilized for adjustment of the mA and/or  KV according to patient size.     DLP in mGycm= 821.     Ill-defined low density within both occipital lobes. No mass effect. No  acute hemorrhage.     Normal ventricle size.     No skull fracture is seen.  Clear paranasal sinuses.     Summary:  1. Bilateral occipital lobe hypodensity compatible with subacute  ischemic change. MRI correlation recommended.  2. Report called to Dr. Zamarripa in the emergency room at 11:15 AM.                                   This report was finalized on 07/03/2017 11:20 by Dr. Camilo Almodovar MD.    MRI Brain With & Without Contrast [[id number]] Collected:  07/03/17 1238     Updated:  07/03/17 1306    Narrative:       MRI BRAIN W WO CONTRAST- 7/3/2017 11:57 AM CDT     HISTORY: Ischemic changes; R13.12-Dysphagia, oropharyngeal phase     COMPARISON: None.       TECHNIQUE: Multiplanar imaging of the brain was performed in a routine  fashion before and after the intravenous injection of  gadolinium  contrast.     FINDINGS:   Diffusion: No restriction of diffusion to suggest acute ischemia.     Midline structures: Nondisplaced.     Ventricles: Normal in configuration and symmetric in size.     Masses: No masses or mass effect.     Basilar cisterns: Maintained.     Extra axial space: No abnormal extra-axial fluid.     Gray-white matter signal: There is abnormal signal. There is bilateral  subcortical signal on the T2 and FLAIR sequences involving the  cerebellum and posterior occipital and parietal lobes. There is no  associated enhancement. Most likely this is posterior reversible  encephalopathy syndrome. (P RES)     Cerebellum: Abnormal signal is noted in the cerebellum most likely  associated with the pres.     Brainstem: Normal.     Enhancement: No abnormal enhancement.     Other: Proximal cervical spinal cord is normal. Bilateral globes and  orbits are normal in appearance. Normal cerebrovascular flow voids  noted. Mucous cyst is noted in the right maxillary antrum mucosal  thickening is present in the left maxillary antrum       Impression:       1. Posterior reversible encephalopathy syndrome. Follow-up in 8 weeks is  suggested.         This report was finalized on 07/03/2017 12:53 by Dr. Adolph Echols MD.        I have personally reviewed and interpreted the radiology studies and ECG obtained at time of admission.     Assessment / Plan     Assessment & Plan   1.  Possible angioedema related to lisinopril.  2.  Escalated hypertension.  3.  Possible new onset seizure disorder versus convulsive syncope.  4.  Posterior reversible encephalopathy syndrome on MRI.  5.  Tongue bite with sublingual hematoma.  6.  Tobacco abuse.  7.  Obstructive sleep apnea, noncompliant with device.  8.  Gouty arthritis.    He will be admitted to my service at [facility].  He is placed in the cardiac care unit to follow his blood pressure and also be placed on a Cardene drip.  I will escalate his  antihypertensive regimen and obviously hold his lisinopril.  Also wanted to do a 2-D echocardiogram given his edema and difficult to control hypertension.    Consult neurology to evaluate the finding of PRES on his MRI.  I doubt very seriously that he had a true seizure today.  He likely had an episode related to this finding or also possibly convulsive syncope.  I doubt he will need antiepileptic drugs.  I will leave whether or not he needs an EEG to the neurologist.    Consult otolaryngology to evaluate his tongue hematoma and sublingual hematoma.  Hopefully this issue will not worsen.  He also apparently had angioedema that preceded this issue.  Remain on IV Decadron and Pepcid.  Hold his nonsteroidal anti-inflammatory drugs for now as well.  Pain control with oral Norco or IV Dilaudid.    SCDs for DVT prophylaxis.    Code Status: Full.      I discussed the patients findings and my recommendations with the patient, his daughter, and his nurse, Ginny.     Estimated length of stay is at least overnight.     Jairon Perez DO   07/03/17   3:39 PM               Electronically signed by Jairon Perez DO at 7/3/2017  4:10 PM           Operative/Procedure Notes (last 72 hours) (Notes from 7/16/2017  3:55 PM through 7/19/2017  3:55 PM)      EPI Jain at 7/17/2017  1:59 PM  Version 1 of 1     Pre-procedure Diagnoses:    1. History of angioedema [Z87.898]           Procedures:    1. TRACHEOSTOMY REPLACEMENT [EQF745 (Custom)]               Pre-procedure Diagnoses:               Procedures:     TRACHEOSTOMY REPLACEMENT [HFI837 (Custom)]               Procedure Note:      Procedure:   Tracheostomy Change     Preoperative Diagnosis:   Angioedema requiring tracheostomy     Postoperative Diagnosis:   Angioedema requiring trachestomy     Description of Procedure:  Patient was placed in upright position in bed. Existing Shiley trach was removed without incident. Stoma patent and intact without granulation. #6  Shiley nonfenestrated uncuffed trach was placed without difficulty. Patient tolerated procedure well.      Plan:   Continue current trach care. Change inner cannula daily             Electronically signed by EPI Jain at 7/17/2017  2:01 PM           Discharge Summary      Jairon Perez DO at 7/19/2017 10:18 AM              [facility] Medicine Services  DISCHARGE SUMMARY       Date of Admission: 7/3/2017  Date of Discharge:  7/19/2017  Primary Care Physician: Linda Hoffman, SUSHMA, APRN    Presenting Problem/History of Present Illness:  Tongue swelling which preceded possible seizure activity.     Final Discharge Diagnoses:  1. Possible angioedema related to lisinopril prior to presentation and tongue bite.  2. Malignant hypertension at presentation and still difficult to control.   3. Possible new onset seizure disorder versus convulsive syncope.  4. Posterior reversible encephalopathy syndrome on MRI.  5. Tongue bite with sublingual hematoma, much improved.  6. Tobacco abuse.  7. Obstructive sleep apnea, noncompliant with device.  8. Daily alcohol use with recent Delirium tremens requiring sedation and mechanical ventilation.  9. Hypokalemia, replaced.   10. Clostridium difficile colitis.  11. Possible lingual or sublingual infection, MSSA on culture.  12. Bilateral lower lobe infiltrates consistent with ventilator associated pneumonia combined with atelectasis.  13. Acute gouty arthritis of the left great toe.     Consults:   1. Dr. Campos and Dr. Arroyo with neurology.   2. Dr. Gallo and Dr. Petersen with pulmonary.   3. Dr. Pierson with ENT.   4. Dr. Minna Tran with general surgery.     Procedures Performed: Tracheostomy with Dr. Pierson. Central line placement by Dr. Minna Tran.      Pertinent Test Results:   Imaging Results (all)     Procedure Component Value Units Date/Time    XR Shoulder 2+ View Right [[id number]] Collected:  07/03/17 0913     Updated:   07/03/17 0918    Narrative:       EXAMINATION: XR SHOULDER 2+ VW RIGHT-     7/3/2017 10:02 AM EDT     HISTORY: Right shoulder pain.     Right shoulder, 3 views.     Old healed fracture of the right clavicle at the junction of the mid and  distal one third.  Bony hypertrophy of the distal clavicle with narrowing of the outlet and  increased risk for impingement.  No AC joint separation.     Mild humeral head spurring.     High riding humeral head with the appearance of rotator cuff  insufficiency.     Summary:  1. Bony narrowing of the outlet based on the clavicle.  2. High riding humeral head.  3. MRI correlation recommended to assess the rotator cuff integrity..  This report was finalized on 07/03/2017 09:15 by Dr. Camilo Almodovar MD.    CT Head Without Contrast [[id number]] Collected:  07/03/17 1114     Updated:  07/03/17 1123    Narrative:       EXAMINATION: CT HEAD WO CONTRAST-      7/3/2017 9:43 AM EDT     HISTORY: seizures     In order to have a CT radiation dose as low as reasonably achievable  Automated Exposure Control was utilized for adjustment of the mA and/or  KV according to patient size.     DLP in mGycm= 821.     Ill-defined low density within both occipital lobes. No mass effect. No  acute hemorrhage.     Normal ventricle size.     No skull fracture is seen.  Clear paranasal sinuses.     Summary:  1. Bilateral occipital lobe hypodensity compatible with subacute  ischemic change. MRI correlation recommended.  2. Report called to Dr. Zamarripa in the emergency room at 11:15 AM.                                   This report was finalized on 07/03/2017 11:20 by Dr. Camilo Almodovar MD.    MRI Brain With & Without Contrast [[id number]] Collected:  07/03/17 1238     Updated:  07/03/17 1306    Narrative:       MRI BRAIN W WO CONTRAST- 7/3/2017 11:57 AM CDT     HISTORY: Ischemic changes; R13.12-Dysphagia, oropharyngeal phase     COMPARISON: None.       TECHNIQUE: Multiplanar imaging of the brain was performed in a  routine  fashion before and after the intravenous injection of gadolinium  contrast.     FINDINGS:   Diffusion: No restriction of diffusion to suggest acute ischemia.     Midline structures: Nondisplaced.     Ventricles: Normal in configuration and symmetric in size.     Masses: No masses or mass effect.     Basilar cisterns: Maintained.     Extra axial space: No abnormal extra-axial fluid.     Gray-white matter signal: There is abnormal signal. There is bilateral  subcortical signal on the T2 and FLAIR sequences involving the  cerebellum and posterior occipital and parietal lobes. There is no  associated enhancement. Most likely this is posterior reversible  encephalopathy syndrome. (P RES)     Cerebellum: Abnormal signal is noted in the cerebellum most likely  associated with the pres.     Brainstem: Normal.     Enhancement: No abnormal enhancement.     Other: Proximal cervical spinal cord is normal. Bilateral globes and  orbits are normal in appearance. Normal cerebrovascular flow voids  noted. Mucous cyst is noted in the right maxillary antrum mucosal  thickening is present in the left maxillary antrum       Impression:       1. Posterior reversible encephalopathy syndrome. Follow-up in 8 weeks is  suggested.         This report was finalized on 07/03/2017 12:53 by Dr. Adolph Echols MD.    XR Chest 1 View [[id number]] Collected:  07/04/17 1543     Updated:  07/04/17 1548    Narrative:       EXAMINATION: XR CHEST 1 VW- 7/4/2017 14:43 CST     HISTORY: Intubation     Comparison: None     Findings:  Endotracheal tube is in place with tip approximately 3 cm above the  jolly. Low lung volumes are present bilaterally. There is atelectasis  at the left lung base. The lung bases otherwise appear clear. The  pulmonary vasculature appears normal.       Impression:       Impression:  1. Apparent appropriate endotracheal tube position.  2. Left basilar atelectasis.  This report was finalized on 07/04/2017 15:45 by   Driss Ward MD.    XR Chest 1 View [[id number]] Collected:  07/05/17 0724     Updated:  07/05/17 0728    Narrative:       Frontal supine radiograph of the chest 7/5/2017 3:10 AM CDT     History: Intubated Patient; R13.12-Dysphagia, oropharyngeal phase;  R56.9-Unspecified convulsions; T78.3XXS-Angioneurotic edema, sequela;  F10.231-Alcohol dependence with withdrawal delirium     Comparison: 07/04/2017      Findings:   Lines and tubes are stable in position. No new opacities or  pneumothoraces are visualized in the chest. The cardiomediastinal  silhouette and pulmonary vascularity are unchanged.       No acute osseous or soft tissue abnormality is noted.        Impression:       Impression:   1. No significant interval change since previous exam.        This report was finalized on 07/05/2017 07:25 by Dr. Dilshad Silver MD.    XR Abdomen KUB [[id number]] Collected:  07/05/17 1207     Updated:  07/05/17 1210    Narrative:       EXAMINATION:   XR ABDOMEN KUB-  7/5/2017 12:07 PM CDT     HISTORY: NG tube placement     Single view the abdomen is obtained. Nasogastric tube is present with  the distal tip satisfactorily positioned in the fundus the stomach.  Bowel gas pattern is nonspecific.       Impression:       Satisfactory placement of nasogastric tube  This report was finalized on [id number] by Dr. Adolph Echosl MD.    XR Chest 1 View [[id number]] Collected:  07/06/17 0718     Updated:  07/06/17 0722    Narrative:       Frontal supine radiograph of the chest 7/6/2017 3:04 AM CDT     History: Intubated Patient; R13.12-Dysphagia, oropharyngeal phase;  R56.9-Unspecified convulsions; T78.3XXS-Angioneurotic edema, sequela;  F10.231-Alcohol dependence with withdrawal delirium     Comparison: 07/05/2017      Findings:   The endotracheal tube is stable in position. An NG tube has been  advanced into the stomach.. No new opacities or pneumothoraces are  visualized in the chest. The cardiomediastinal silhouette and  pulmonary  vascularity are unchanged.       No acute osseous or soft tissue abnormality is noted.        Impression:       Impression:   1. No significant interval change since previous exam.        This report was finalized on 07/06/2017 07:19 by Dr. Dilshad Silver MD.    XR Chest 1 View [[id number]] Collected:  07/07/17 0708     Updated:  07/07/17 0712    Narrative:       EXAMINATION:   XR CHEST 1 VW-  7/7/2017 7:08 AM CDT     HISTORY: Intubated patient     Single view the chest obtained. The lungs are clear. Cardiac silhouettes  mildly enlarged. Endotracheal tube nasogastric tube are satisfactorily  position.     This compared prior study 07/06/2017.     Obdulia and stable single view chest  This report was finalized on 07/07/2017 07:09 by Dr. Adolph Echols MD.    XR Chest 1 View [[id number]] Collected:  07/07/17 2137     Updated:  07/07/17 2153    Narrative:       EXAMINATION: XR CHEST 1 VW- 7/7/2017 9:31 PM CDT     HISTORY: Central line placement.     COMPARISON: 07/07/2017.     FINDINGS:  The left subclavian central line has been placed with tip in the  proximal SVC. No pneumothorax is identified. The endotracheal and  enteric tubes remain in place. Scattered areas of subsegmental  atelectasis are present. The heart is magnified but felt to be mildly  enlarged. The pulmonary vasculature is stable.       Impression:       Interval placement of left subclavian central line without  evidence of pneumothorax.  This report was finalized on 07/07/2017 21:50 by Dr. Driss Ward MD.    US Venous Doppler Upper Extremity Left (duplex) [[id number]] Collected:  07/08/17 0719     Updated:  07/08/17 0723    Narrative:       EXAMINATION:   US VENOUS DOPPLER UPPER EXTREMITY LEFT (DUPLEX)-   7/8/2017 7:19 AM CDT     HISTORY: Venous Doppler analysis of the left upper extremity.     Irineo vein demonstrates compression and color flow.     Left subclavian vein demonstrates color flow and augmentation.     Axillary vein  demonstrates compression color flow and augmentation.     The brachial vein demonstrates color flow and compression. The left  basilic vein demonstrates lack of compression above the elbow. This lack  of compression is consistent with thrombus in the basilic vein. The  cephalic vein demonstrates color flow compression. The radial vein  demonstrates color flow compression. The ulnar vein demonstrates color  flow compression.       Impression:       Thrombus is visualized in the left basilic vein above the  elbow.  This report was finalized on 07/08/2017 07:20 by Dr. Adolph Echols MD.    US Arterial Doppler Upper Extremity Left [[id number]] Collected:  07/08/17 0720     Updated:  07/08/17 0725    Narrative:       EXAMINATION:   US ARTERIAL DOPPLER UPPER EXTREMITY  LEFT-  7/8/2017 7:20  AM CDT     HISTORY: Left upper extremity duplex arterial analysis     On this examination the left subclavian artery demonstrates color flow  and peak systolic velocity 45 cm/s. Axillary artery demonstrates color  flow with a peak systolic velocity 104 cm/s. The brachial artery  demonstrates peak systolic velocity 130 cm/s with triphasic waveform.  The radial artery demonstrates color flow and triphasic waveform peak  systolic velocity 78 cm/s. The ulnar artery demonstrates triphasic  waveform color flow and peak systolic velocity 67 cm/s.       Impression:       Negative left upper extremity arterial duplex ultrasound  This report was finalized on 07/08/2017 07:22 by Dr. Adolph Echols MD.    XR Chest 1 View [[id number]] Collected:  07/08/17 0846     Updated:  07/08/17 0935    Narrative:       EXAMINATION:   XR CHEST 1 VW-  7/8/2017 8:45 AM CDT     HISTORY: Frontal supine radiograph of the chest 7/8/2017 3:22 AM CDT     COMPARISON: 07/07/2017.     FINDINGS:   The lungs are clear. The cardiomediastinal silhouette and pulmonary  vascularity are within normal limits. Endotracheal tube is  satisfactorily positioned. Left subclavian catheter  is unchanged.     The osseous structures and surrounding soft tissues demonstrate no acute  abnormality.       Impression:       1. No radiographic evidence of acute cardiopulmonary process.     This report was finalized on 07/08/2017 09:31 by Dr. Adolph Echols MD.    CT Head With & Without Contrast [[id number]] Collected:  07/08/17 1233     Updated:  07/08/17 1239    Narrative:       CT BRAIN without and with contrast dated 7/8/2017 11:43 AM CDT     HISTORY: Possible sublingual abscess     COMPARISON: None      DOSE LENGTH PRODUCT: 1779 mGy cm     In order to have a CT radiation dose as low as reasonably achievable,  Automated Exposure Control was utilized for adjustment of the mA and/or  KV according to patient size.     TECHNIQUE: Serial axial tomographic images of the brain were obtained  without and with the use of intravenous contrast.      FINDINGS:   The midline structures are nondisplaced. The ventricles and basilar  cisterns are normal in size and configuration. There is no evidence of  intracranial hemorrhage or mass-effect. The gray-white matter  differentiation is maintained. The sulci have a normal configuration,  and there are no abnormal extra-axial fluid collections. The structures  of the posterior fossa are unremarkable.      The included orbits and their contents are unremarkable.    . The  visualized osseous structures and overlying soft tissues of the skull  and face are unremarkable.      There is no abnormal enhancement.       Impression:       1. Negative CT brain without and with contrast        This report was finalized on 07/08/2017 12:36 by Dr. Adolph Echols MD.    CT Soft Tissue Neck With & Without Contrast [[id number]] Collected:  07/08/17 1258     Updated:  07/08/17 1307    Narrative:       CT SOFT TISSUE NECK W WO CONTRAST- 7/8/2017 11:43 AM CDT     HISTORY: Possible sublingual or ligual abcess; R13.12-Dysphagia,  oropharyngeal phase; R56.9-Unspecified  convulsions;  T78.3XXS-Angioneurotic edema, sequela; F10.231-Alcohol dependence with  withdrawal delirium      COMPARISON: NONE      DOSE LENGTH PRODUCT: 602 mGy cm. Automated exposure control was also  utilized to decrease patient radiation dose.     TECHNIQUE: Serial helical tomographic images of the neck were obtained  in the standard fashion following the intravenous administration of  Isovue contrast.       FINDINGS:    Orbits, paranasal sinuses, and skull base: Normal     Nasopharynx: A nasogastric tube is present. Fluid is present in the  right maxillary antrum mucosal thickening is present left maxillary  antrum     Suprahyoid neck: Endotracheal tube is present. Pleural cavities obscured  by dental work. The base the tongue appears symmetric as visualized.  Submandibular glands are visualized. Sternomastoids sternocleidomastoid  muscles are symmetric.     Infrahyoid neck: Normal larynx, hypopharynx and supraglottis.     Thyroid: Normal.     Thoracic inlet: Pleural thickening is noted in the apex of the right  chest.  Lymph nodes: Normal. No lymphadenopathy.     Vascular structures: Normal.     Bones: Degenerative disc disease and degenerative spondylosis is present  in the cervical spine.     Other findings: None.       Impression:       1. No acute abnormalities identified on the enhanced CT soft tissue the  neck.  2. Nasogastric tube and endotracheal tube are present. Artifact from  dental work is noted.  3. Fluid in the right maxillary antrum is present  4. Degenerative spondylosis in the cervical spine        This report was finalized on 07/08/2017 13:04 by Dr. Adolph Echols MD.    XR Chest 1 View [[id number]] Collected:  07/09/17 0729     Updated:  07/09/17 0733    Narrative:       EXAMINATION:   XR CHEST 1 VW-  7/9/2017 7:29 AM CDT     HISTORY: Patient intubated     Single view the chest obtained. Atelectasis right lung base is noted.  Left lung is clear. Cardiac silhouettes mildly enlarged.  Nasogastric  tube and endotracheal tube are satisfactorily position.       Impression:       Mild atelectasis right lung base slightly increased since  July 8  This report was finalized on 07/09/2017 07:30 by Dr. Adolph Echols MD.    XR Chest 1 View [[id number]] Collected:  07/10/17 0708     Updated:  07/10/17 0712    Narrative:       EXAMINATION:   XR CHEST 1 VW-  7/10/2017 7:08 AM CDT     HISTORY: Intubated     Single view the chest obtained. Left lung is clear. Cardiac silhouettes  mildly enlarged unchanged from July 9. Left subclavian catheter is  present. Nasogastric tubes present. Mild atelectasis right lung base is  present.       Impression:       Atelectasis right lung base persists not significantly  changed from July 9.        2. Mild-to-moderate cardiomegaly also unchanged  This report was finalized on 07/10/2017 07:09 by Dr. Adolph Echols MD.    XR Abdomen KUB [[id number]] Collected:  07/10/17 1314     Updated:  07/10/17 1319    Narrative:       HISTORY: Abdomen distended and firm.     FINDINGS: KUB radiograph demonstrates a nonspecific bowel gas pattern.  There is noted to be a distended loop of what I suspect represents the  sigmoid colon with a sigmoid volvulus in the differential. I do not see  evidence of obstruction or pneumatosis. There is no evidence of  pneumoperitoneum.       Impression:       . Nonspecific bowel gas pattern with gas in both small bowel  and colon. There is some asymmetric distention of what appears to be a  loop of the sigmoid colon with a sigmoid volvulus a differential  although considered unlikely given the lack of distention of the more  proximal bowel and the patient's age. If symptoms are persistent follow  up with CT imaging could be obtained for further characterization.  This report was finalized on 07/10/2017 13:16 by Dr. Dilshad Silver MD.    CT Abdomen Pelvis Without Contrast [[id number]] Collected:  07/10/17 2035     Updated:  07/10/17 2047    Narrative:        EXAMINATION: CT ABDOMEN PELVIS WO CONTRAST- 7/10/2017 8:35 PM CDT     HISTORY: Abdominal distention and pain, possible sigmoid volvulus.     DOSE: 1821 mGycm (Automatic exposure control technique was implemented  in an effort to keep the radiation dose as low as possible without  compromising image quality)     REPORT: Spiral CT of the abdomen and pelvis was performed without  intravenous or oral contrast from the lung bases through the pubic  symphysis. Reconstructed coronal and sagittal images are also reviewed.  Comparison: KUB on the same date. There is no previous CT. Lung windows  demonstrate consolidation of the lower lobe laterally with air  bronchograms, probable bilateral pneumonia mixed with atelectasis. There  are tiny bilateral pleural effusions. A nasogastric tube is present in  good position. There is mild cardiomegaly. The liver and spleen appear  homogeneous. The gallbladder is slightly contracted. Pancreas and  adrenal glands are within normal limits. No nephrolithiasis is seen and  there is no hydronephrosis. There is perinephric fat stranding  bilaterally, which is nonspecific. There is no evidence of bowel  obstruction or sigmoid volvulus. The sigmoid colon is redundant. There  is moderate sigmoid diverticulosis. There is nonspecific increased  attenuation of fat planes in the pelvis, including presacral fat planes  and fat planes at the level of the aortic bifurcation and inguinal  regions. There is also mildly increased attenuation of fat planes over  the flanks, slightly greater on the right. There are fat containing  inguinal hernias bilaterally. No intra-abdominal abscess or free air is  identified. The inflammatory changes do not appear to be associated with  the sigmoid colon. The appendix is normal. There is a small  fat-containing periumbilical hernia that measures 2.6 cm. Review of bone  windows is unremarkable.       Impression:       1. No evidence of bowel obstruction or sigmoid  volvulus. There is  moderate sigmoid diverticulosis without acute diverticulitis.  2. Nonspecific mild fat stranding/inflammation within the  retroperitoneum and extraperitoneal fat planes in the pelvis, without  evidence of an abscess.  3. Extensive infiltrates in the lower lobes with air bronchograms  suspicious for acute lateral lower lobe pneumonia probably combined with  atelectasis.        This report was finalized on 07/10/2017 20:44 by Dr. Marvin Mar MD.    XR Chest 1 View [[id number]] Collected:  07/11/17 0722     Updated:  07/11/17 0726    Narrative:       EXAMINATION:   XR CHEST 1 VW-  7/11/2017 7:22 AM CDT     HISTORY: Respiratory failure.     Frontal upright radiograph of the chest 7/11/2017 3:15 AM CDT     COMPARISON: 07/10/2017.     FINDINGS:   The lungs are clear. Cardiac silhouettes mildly enlarged. Left  subclavian catheter and endotracheal tube are present and satisfactorily  positioned.      The osseous structures and surrounding soft tissues demonstrate no acute  abnormality.       Impression:       1. No radiographic evidence of acute cardiopulmonary process.        This report was finalized on 07/11/2017 07:23 by Dr. Adolph Echols MD.    XR Chest 1 View [[id number]] Collected:  07/12/17 0705     Updated:  07/12/17 0710    Narrative:       EXAMINATION:   XR CHEST 1 -  7/12/2017 7:05 AM CDT     HISTORY: Respiratory failure     Single view the chest obtained. Left lung is clear. Mild right  infrahilar atelectasis is present. Left diaphragms indistinct consistent  with atelectasis left lower lobe. Infiltrate tube nasogastric tube are  present. Left subclavian catheter is present.     IMPRESSION  1. Atelectasis left lower lobe.        2. Mild atelectasis present in the right infrahilar region.  This report was finalized on 07/12/2017 07:07 by Dr. Adolph Echols MD.    XR Abdomen KUB [[id number]] Collected:  07/12/17 0713     Updated:  07/12/17 0716    Narrative:       EXAMINATION:   XR ABDOMEN  KUB-  7/12/2017 7:13 AM CDT     HISTORY: NG tube     Single view the abdomen is obtained. Nasogastric tube is present  satisfactorily positioned in the fundus the stomach.       Impression:       Satisfactory placement of nasogastric tube  This report was finalized on 07/12/2017 07:13 by Dr. Adolph Echols MD.    XR Chest 1 View [[id number]] Collected:  07/13/17 0705     Updated:  07/13/17 0709    Narrative:       EXAMINATION: XR CHEST 1 VW-. 7/13/2017 7:05 AM CDT     CHEST, ONE VIEW:     HISTORY: Respiratory failure     COMPARISON: 07/12/2017     A single frontal chest radiograph was obtained.     FINDINGS:     Tracheostomy tube and NG tube identified. Left-sided central venous  catheter observed.     Patchy lower lobe airspace opacities again noted with diminished lung  volumes with shallow inspiration likely stable.                                     Impression:       1. Stable one-day appearance the chest.     This report was finalized on 07/13/2017 07:06 by Dr. Pierre Pan MD.    US Renal Artery Complete [[id number]] Collected:  07/12/17 2008     Updated:  07/13/17 1645    Narrative:       EXAMINATION: US RENAL ARTERY COMPLETE- 7/12/2017 8:08 PM CDT     HISTORY: Uncontrolled hypertension.     REPORT: Sonographic images of the kidneys were obtained, examination  includes Doppler analysis of the renal arteries, using color, gray scale  and spectral Doppler techniques were duplex study. There are no  comparison studies.     The right kidney measures 10.3 x 5.5 x 4.9 cm and has normal morphology  and echogenicity. There is no hydronephrosis. The tail flow velocities  are provided on the ultrasound worksheet, however the origin the right  renal artery is obscured. The mid right renal artery, the peak systolic  velocity measures 116.8 cm/s, this corresponds with the velocity ratio  compared to the abdominal aorta of 1.72. The resistive index measures  0.55. Velocities in the distal right renal arteries are normal.      Left kidney measures 11.5 x 6.7 x 5.3 cm and has normal morphology and  echogenicity, there is no hydronephrosis on the left. Doppler images are  limited for the left kidney, both the origin and left mid renal artery  are obscured. The PSV at the distal left renal artery measures 99.2 cm/s  with a velocity ratio compared to the abdominal aorta of 1.46 and  resistive index of 0.60. The bladder appears unremarkable.       Impression:       1. Normal morphological appearance of both kidneys.  2. Limited study with obscuration of the renal artery origins as well as  the mid left renal artery, no Doppler evidence for significant renal  artery stenosis is seen however. If more detailed evaluation is  indicated clinically, CTA would be the best study.  This report was finalized on 07/12/2017 20:13 by Dr. Marvin Mar MD.    US Renal Bilateral [[id number]] Collected:  07/12/17 2008     Updated:  07/13/17 1645    Narrative:       EXAMINATION: US RENAL ARTERY COMPLETE- 7/12/2017 8:08 PM CDT     HISTORY: Uncontrolled hypertension.     REPORT: Sonographic images of the kidneys were obtained, examination  includes Doppler analysis of the renal arteries, using color, gray scale  and spectral Doppler techniques were duplex study. There are no  comparison studies.     The right kidney measures 10.3 x 5.5 x 4.9 cm and has normal morphology  and echogenicity. There is no hydronephrosis. The tail flow velocities  are provided on the ultrasound worksheet, however the origin the right  renal artery is obscured. The mid right renal artery, the peak systolic  velocity measures 116.8 cm/s, this corresponds with the velocity ratio  compared to the abdominal aorta of 1.72. The resistive index measures  0.55. Velocities in the distal right renal arteries are normal.     Left kidney measures 11.5 x 6.7 x 5.3 cm and has normal morphology and  echogenicity, there is no hydronephrosis on the left. Doppler images are  limited for the left  kidney, both the origin and left mid renal artery  are obscured. The PSV at the distal left renal artery measures 99.2 cm/s  with a velocity ratio compared to the abdominal aorta of 1.46 and  resistive index of 0.60. The bladder appears unremarkable.       Impression:       1. Normal morphological appearance of both kidneys.  2. Limited study with obscuration of the renal artery origins as well as  the mid left renal artery, no Doppler evidence for significant renal  artery stenosis is seen however. If more detailed evaluation is  indicated clinically, CTA would be the best study.  This report was finalized on 07/12/2017 20:13 by Dr. Marvin Mar MD.    XR Chest 1 View [[id number]] Collected:  07/14/17 0721     Updated:  07/14/17 0725    Narrative:       Frontal supine radiograph of the chest 7/14/2017 2:10 CST     History: respiratory failure, patient intubated.; R13.12-Dysphagia,  oropharyngeal phase; R56.9-Unspecified convulsions;  T78.3XXS-Angioneurotic edema, sequela; F10.231-Alcohol dependence with  withdrawal delirium; Z74.09-Other reduced mobility     Comparison: Chest x-ray dated 07/13/2017      Findings:   Right PICC in place terminating near the atrial caval junction. Left  subclavian catheter has been removed. Remaining lines and tubes are  stable in position. No new opacities or pneumothoraces are visualized in  the chest. The cardiomediastinal silhouette and pulmonary vascularity  are unchanged.       No acute osseous or soft tissue abnormality is noted.        Impression:       Impression:    No significant interval change since previous exam.        This report was finalized on 07/14/2017 07:22 by Dr. Martita Finnegan MD.    XR Abdomen KUB [[id number]] Collected:  07/14/17 1254     Updated:  07/14/17 1257    Narrative:       EXAMINATION:   XR ABDOMEN KUB-  7/14/2017 12:54 PM CDT     HISTORY: NG tube placement     Single view the abdomen is obtained. Nasogastric tube is present  satisfactorily  position in the fundus the stomach.       Impression:       Satisfactory placement nasogastric tube  This report was finalized on 07/14/2017 12:54 by Dr. Adolph Echols MD.    XR Chest 1 View [[id number]] Collected:  07/15/17 1012     Updated:  07/15/17 1017    Narrative:       HISTORY: Respiratory failure, intubated     CXR: A frontal view the chest is obtained.     Comparison: 07/14/2017     Findings: Tracheostomy tube is appropriate in position. The prior  enteric tube has been removed. A new right upper extremity PICC line is  in place with the tip near the atrial caval junction.     There is a diminished level of inspiration. There is mild elevation of  the right hemidiaphragm. There are left lower lobe opacities. There is a  questionable small left pleural effusion. There is no pneumothorax. The  cardiomediastinal contours are similar       Impression:       1. Removal of the enteric tube with placement of a right upper extremity  PICC line. Tracheostomy tube appropriate in position.  2. Left lower lobe opacities may be patchy atelectasis. Consolidation  considered.      This report was finalized on 07/15/2017 10:14 by Dr. Sri Rivera MD.    US Venous Doppler Lower Extremity Bilateral (duplex) [[id number]] Collected:  07/16/17 1022     Updated:  07/16/17 1025    Narrative:       DUPLEX ULTRASOUND OF THE LOWER EXTREMITIES 7/16/2017 8:44 CST     HISTORY: BLE edema & pain; R13.12-Dysphagia, oropharyngeal phase;  R56.9-Unspecified convulsions; T78.3XXS-Angioneurotic edema, sequela;  F10.231-Alcohol dependence with withdrawal delirium; Z74.09-Other  reduced mobility     COMPARISON: NONE     FINDINGS:  Transverse and longitudinal grayscale and Doppler sonographic images of  bilateral lower extremities were obtained.     There is normal flow and compressibility of bilateral common femoral,  superficial femoral, popliteal, peroneal, anterior tibial, and posterior  tibial veins.       Impression:       1. Normal  duplex ultrasound of bilateral lower extremities without  evidence of DVT.     This report was finalized on 07/16/2017 10:22 by Dr. Sri Rivera MD.    FL Video Swallow With Speech [[id number]] Collected:  07/17/17 1339     Updated:  07/17/17 1343    Narrative:       EXAMINATION: FL VIDEO SWALLOW W SPEECH-  7/17/2017 2:39 PM EDT     HISTORY: Dysphagia     COMPARISON:None     TECHNIQUE:     Image number: 1     Fluoroscopy time: 1.2 minutes     FINDINGS:     The oral and pharyngeal phase of swallowing was evaluated with multiple  barium consistencies.     Patient tolerated all consistencies well without laryngeal penetration  or aspiration.       Impression:       1. Negative dysphagia study.  This report was finalized on 07/17/2017 13:40 by Dr. Pierre Pan MD.        Lab Results (all)     Procedure Component Value Units Date/Time    CBC & Differential [[id number]] Collected:  07/03/17 0807    Specimen:  Blood Updated:  07/03/17 0832    Narrative:       The following orders were created for panel order CBC & Differential.  Procedure                               Abnormality         Status                     ---------                               -----------         ------                     CBC Auto Differential[[id number]]        Abnormal            Final result                 Please view results for these tests on the individual orders.    CBC Auto Differential [[id number]]  (Abnormal) Collected:  07/03/17 0807    Specimen:  Blood Updated:  07/03/17 0832     WBC 11.95 (H) 10*3/mm3      RBC 3.93 (L) 10*6/mm3      Hemoglobin 11.9 (L) g/dL      Hematocrit 34.1 (L) %      MCV 86.8 fL      MCH 30.3 pg      MCHC 34.9 g/dL      RDW 14.0 %      RDW-SD 44.0 fl      MPV 9.6 fL      Platelets 245 10*3/mm3      Neutrophil % 82.0 (H) %      Lymphocyte % 8.3 (L) %      Monocyte % 8.9 %      Eosinophil % 0.3 %      Basophil % 0.2 %      Immature Grans % 0.3 %      Neutrophils, Absolute 9.81 (H) 10*3/mm3      Lymphocytes,  Absolute 0.99 10*3/mm3      Monocytes, Absolute 1.06 10*3/mm3      Eosinophils, Absolute 0.03 10*3/mm3      Basophils, Absolute 0.02 10*3/mm3      Immature Grans, Absolute 0.04 (H) 10*3/mm3     Comprehensive Metabolic Panel [[id number]]  (Abnormal) Collected:  07/03/17 0807    Specimen:  Blood Updated:  07/03/17 0841     Glucose 127 (H) mg/dL      BUN 9 mg/dL      Creatinine 1.19 mg/dL      Sodium 141 mmol/L      Potassium 3.0 (L) mmol/L      Chloride 102 mmol/L      CO2 29.0 mmol/L      Calcium 8.8 mg/dL      Total Protein 6.9 g/dL      Albumin 4.00 g/dL      ALT (SGPT) 39 U/L      AST (SGOT) 72 (H) U/L      Alkaline Phosphatase 69 U/L      Total Bilirubin 0.6 mg/dL      eGFR Non African Amer 64 mL/min/1.73      Globulin 2.9 gm/dL      A/G Ratio 1.4 g/dL      BUN/Creatinine Ratio 7.6     Anion Gap 10.0 mmol/L     Urinalysis With / Culture If Indicated [[id number]]  (Abnormal) Collected:  07/03/17 0834    Specimen:  Urine from Urine, Clean Catch Updated:  07/03/17 0858     Color, UA Yellow     Appearance, UA Clear     pH, UA 7.5     Specific Gravity, UA 1.014     Glucose, UA Negative     Ketones, UA Negative     Bilirubin, UA Negative     Blood, UA Small (1+) (A)     Protein, UA 30 mg/dL (1+) (A)     Leuk Esterase, UA Negative     Nitrite, UA Negative     Urobilinogen, UA 0.2 E.U./dL    Urinalysis, Microscopic Only [[id number]]  (Abnormal) Collected:  07/03/17 0834    Specimen:  Urine from Urine, Clean Catch Updated:  07/03/17 0858     RBC, UA 3-5 (A) /HPF      WBC, UA 0-2 (A) /HPF      Bacteria, UA None Seen /HPF      Squamous Epithelial Cells, UA 0-2 /HPF      Hyaline Casts, UA None Seen /LPF      Methodology Automated Microscopy    Urine Drug Screen [[id number]]  (Normal) Collected:  07/03/17 0834    Specimen:  Urine from Urine, Clean Catch Updated:  07/03/17 0930     Amphetamine Screen, Urine Negative     Barbiturates Screen, Urine Negative     Benzodiazepine Screen, Urine Negative     Cocaine Screen, Urine Negative      Methadone Screen, Urine Negative     Opiate Screen Negative     Phencyclidine (PCP), Urine Negative     THC, Screen, Urine Negative    Narrative:       Negative Thresholds For Drugs Screened in Urine:    Amphetamines          500 ng/ml  Barbiturates          200 ng/ml  Benzodiazepines       200 ng/ml  Cocaine               150 ng/ml  Methadone             150 ng/ml  Opiates               300 ng/ml  Phencyclidine         25 ng/ml  THC                      50 ng/ml    The normal value for all drugs tested is negative. This report includes final unconfirmed screening results.  A positive result by this assay can be, at your request, sent to the [facility] for confirmation by gas chromatography. Unconfirmed results must not be used for non-medical purposes, such as employment or legal testing. Clinical consideration should be applied to any drug of abuse test result, particularly when unconfirmed results are used.    Basic Metabolic Panel [[id number]]  (Abnormal) Collected:  07/04/17 0937    Specimen:  Blood Updated:  07/04/17 0957     Glucose 126 (H) mg/dL      BUN 12 mg/dL      Creatinine 1.13 mg/dL      Sodium 142 mmol/L      Potassium 3.8 mmol/L      Chloride 105 mmol/L      CO2 26.0 mmol/L      Calcium 9.1 mg/dL      eGFR Non African Amer 68 mL/min/1.73      BUN/Creatinine Ratio 10.6     Anion Gap 11.0 mmol/L     Narrative:       GFR Normal >60  Chronic Kidney Disease <60  Kidney Failure <15    Magnesium [[id number]]  (Normal) Collected:  07/04/17 0937    Specimen:  Blood Updated:  07/04/17 0957     Magnesium 2.0 mg/dL     Blood Gas, Arterial [[id number]]  (Abnormal) Collected:  07/04/17 1635    Specimen:  Arterial Blood Updated:  07/04/17 1640     Site Arterial: left brachial     Gera's Test --      Documented in Rapid Comm        pH, Arterial 7.408 pH units      pCO2, Arterial 44.1 mm Hg      pO2, Arterial 235.2 (H) mm Hg      HCO3, Arterial 27.2 (H) mmol/L      Base Excess, Arterial 2.1 (H) mmol/L      O2  Saturation, Arterial 99.5 %      O2 Saturation Calculated 99.5 %      Barometric Pressure for Blood Gas -- mmHg       Component not reported at this site.        Modality Ventilator     FIO2 60 %      Ventilator Mode AC     Rate 10.0 Breaths/minute      PEEP 5.0     Vent CPAP/PEEP 5.0    Narrative:       Serial Number: [id number]    : [id number]    CBC (No Diff) [[id number]]  (Abnormal) Collected:  07/05/17 0158    Specimen:  Blood Updated:  07/05/17 0213     WBC 15.95 (H) 10*3/mm3      RBC 4.19 (L) 10*6/mm3      Hemoglobin 12.8 (L) g/dL      Hematocrit 38.1 (L) %      MCV 90.9 fL      MCH 30.5 pg      MCHC 33.6 g/dL      RDW 14.7 %      RDW-SD 49.1 fl      MPV 9.6 fL      Platelets 246 10*3/mm3     Blood Gas, Arterial [[id number]]  (Abnormal) Collected:  07/05/17 0224    Specimen:  Arterial Blood Updated:  07/05/17 0227     Site Arterial: right radial     Gera's Test --      Documented in Rapid Comm        pH, Arterial 7.408 pH units      pCO2, Arterial 40.7 mm Hg      pO2, Arterial 101.5 (H) mm Hg      HCO3, Arterial 25.1 mmol/L      Base Excess, Arterial 0.4 mmol/L      O2 Saturation, Arterial 97.7 %      O2 Saturation Calculated 97.7 %      Barometric Pressure for Blood Gas -- mmHg       Component not reported at this site.        Modality Ventilator     FIO2 50 %      Ventilator Mode Assist     Rate 10.0 Breaths/minute      PEEP 5.0     Vent CPAP/PEEP 5.0    Narrative:       Serial Number: [id number]    : [id number]    Basic Metabolic Panel [[id number]]  (Normal) Collected:  07/05/17 0158    Specimen:  Blood Updated:  07/05/17 0229     Glucose 96 mg/dL      BUN 16 mg/dL      Creatinine 1.10 mg/dL      Sodium 143 mmol/L      Potassium 3.7 mmol/L      Chloride 105 mmol/L      CO2 28.0 mmol/L      Calcium 9.1 mg/dL      eGFR Non African Amer 71 mL/min/1.73      BUN/Creatinine Ratio 14.5     Anion Gap 10.0 mmol/L     Narrative:       GFR Normal >60  Chronic Kidney Disease <60  Kidney Failure <15    Blood Gas, Arterial  [[id number]]  (Abnormal) Collected:  07/05/17 0322    Specimen:  Arterial Blood Updated:  07/05/17 0326     Site Arterial: right radial     Gera's Test --      Documented in Rapid Comm        pH, Arterial 7.414 pH units      pCO2, Arterial 43.0 mm Hg      pO2, Arterial 86.5 mm Hg      HCO3, Arterial 26.9 (H) mmol/L      Base Excess, Arterial 2.0 mmol/L      O2 Saturation, Arterial 96.6 %      O2 Saturation Calculated 96.6 %      Barometric Pressure for Blood Gas -- mmHg       Component not reported at this site.        Modality Ventilator     FIO2 30 %      Ventilator Mode Assist     Rate 10.0 Breaths/minute      PEEP 5.0     Vent CPAP/PEEP 5.0    Narrative:       Serial Number: [id number]    : [id number]    CBC (No Diff) [[id number]]  (Abnormal) Collected:  07/06/17 0222    Specimen:  Blood Updated:  07/06/17 0256     WBC 9.27 10*3/mm3      RBC 3.94 (L) 10*6/mm3      Hemoglobin 12.1 (L) g/dL      Hematocrit 35.4 (L) %      MCV 89.8 fL      MCH 30.7 pg      MCHC 34.2 g/dL      RDW 14.7 %      RDW-SD 48.2 fl      MPV 9.7 fL      Platelets 216 10*3/mm3     Basic Metabolic Panel [[id number]]  (Abnormal) Collected:  07/06/17 0222    Specimen:  Blood Updated:  07/06/17 0257     Glucose 87 mg/dL      BUN 15 mg/dL      Creatinine 0.98 mg/dL      Sodium 142 mmol/L      Potassium 3.0 (L) mmol/L      Chloride 108 mmol/L      CO2 27.0 mmol/L      Calcium 8.4 mg/dL      eGFR Non African Amer 81 mL/min/1.73      BUN/Creatinine Ratio 15.3     Anion Gap 7.0 mmol/L     Narrative:       GFR Normal >60  Chronic Kidney Disease <60  Kidney Failure <15    Magnesium [[id number]]  (Normal) Collected:  07/06/17 0222    Specimen:  Blood Updated:  07/06/17 0257     Magnesium 2.1 mg/dL     Blood Gas, Arterial [[id number]] Collected:  07/06/17 0356    Specimen:  Arterial Blood Updated:  07/06/17 0401     Site Arterial: right radial     Gera's Test --      Documented in Rapid Comm        pH, Arterial 7.430 pH units      pCO2, Arterial 35.9 mm Hg       pO2, Arterial 95.6 mm Hg      HCO3, Arterial 23.3 mmol/L      Base Excess, Arterial -0.5 mmol/L      O2 Saturation, Arterial 97.5 %      O2 Saturation Calculated 97.5 %      Barometric Pressure for Blood Gas -- mmHg       Component not reported at this site.        Modality Ventilator     FIO2 30 %      Ventilator Mode AC     Rate 10.0 Breaths/minute      PEEP 5.0     Vent CPAP/PEEP 5.0    Narrative:       Serial Number: [id number]    : [id number]    POC Glucose Fingerstick [[id number]]  (Normal) Collected:  07/06/17 0738    Specimen:  Blood Updated:  07/06/17 0750     Glucose 104 mg/dL       : [id number] Azalia Gross LMeter ID: [id number]       Gastrointestinal Panel, PCR [[id number]]  (Abnormal) Collected:  07/06/17 2327    Specimen:  Stool from Per Rectum Updated:  07/07/17 0124     Campylobacter Not Detected     Clostridium difficile (toxin A/B) Detected (A)        Due to the high asymptomatic carriage rates, especially in young children, the clinical relevance of the detection of toxigenic C. difficile from stool should be considered in the context of other clinical findings, patient age, and risk factors including hospitalization and antibiotic exposure.        Plesiomonas shigelloides Not Detected     Salmonella Not Detected     Vibrio Not Detected     Vibrio cholerae Not Detected     Yersinia enterocolitica Not Detected     Enteroaggregative E. coli (EAEC) Not Detected     Enteropathogenic E. coli (EPEC) Not Detected     Enterotoxigenic E. coli (ETEC) lt/st Not Detected     Shiga-like toxin-producing E. coli (STEC) stx1/stx2 Not Detected     E. coli O157 Not Detected     Shigella/Enteroinvasive E. coli (EIEC) Not Detected     Cryptosporidium Not Detected     Cyclospora cayetanensis Not Detected     Entamoeba histolytica Not Detected     Giardia lamblia Not Detected     Adenovirus F40/41 Not Detected     Astrovirus Not Detected     Norovirus GI/GII Not Detected     Rotavirus A Not Detected      Sapovirus (I, II, IV or V) Not Detected    Basic Metabolic Panel [[id number]]  (Abnormal) Collected:  07/07/17 0204    Specimen:  Blood Updated:  07/07/17 0317     Glucose 119 (H) mg/dL      BUN 14 mg/dL      Creatinine 1.05 mg/dL      Sodium 142 mmol/L      Potassium 3.2 (L) mmol/L      Chloride 111 (H) mmol/L      CO2 23.0 (L) mmol/L      Calcium 8.0 (L) mg/dL      eGFR Non African Amer 74 mL/min/1.73      BUN/Creatinine Ratio 13.3     Anion Gap 8.0 mmol/L     Narrative:       GFR Normal >60  Chronic Kidney Disease <60  Kidney Failure <15    Blood Gas, Arterial [[id number]]  (Abnormal) Collected:  07/07/17 0339    Specimen:  Arterial Blood Updated:  07/07/17 0343     Site Arterial: right radial     Gera's Test --      Documented in Rapid Comm        pH, Arterial 7.434 pH units      pCO2, Arterial 31.5 (L) mm Hg      pO2, Arterial 76.4 (L) mm Hg      HCO3, Arterial 20.6 (L) mmol/L      Base Excess, Arterial -2.5 (L) mmol/L      O2 Saturation, Arterial 95.8 %      O2 Saturation Calculated 95.8 %      Barometric Pressure for Blood Gas -- mmHg       Component not reported at this site.        Modality Ventilator     FIO2 30 %      Ventilator Mode AC     Rate 10.0 Breaths/minute      PEEP 5.0     Vent CPAP/PEEP 5.0    Narrative:       Serial Number: [id number]    : [id number]    Clostridium Difficile Toxin, PCR [[id number]]  (Abnormal) Collected:  07/06/17 1844    Specimen:  Stool from Per Rectum Updated:  07/07/17 1348     C. Difficile Toxins by PCR Positive (A)    Blood Gas, Arterial [[id number]]  (Abnormal) Collected:  07/08/17 0214    Specimen:  Arterial Blood Updated:  07/08/17 0217     Site Arterial: right radial     Gera's Test --      Documented in Rapid Comm        pH, Arterial 7.422 pH units      pCO2, Arterial 35.8 mm Hg      pO2, Arterial 71.1 (L) mm Hg      HCO3, Arterial 22.8 mmol/L      Base Excess, Arterial -1.1 mmol/L      O2 Saturation, Arterial 94.7 %      O2 Saturation Calculated 94.7 %      Barometric  Pressure for Blood Gas -- mmHg       Component not reported at this site.        Modality Ventilator     FIO2 30 %      Ventilator Mode Assist     Rate 10.0 Breaths/minute      PEEP 5.0     Vent CPAP/PEEP 5.0    Narrative:       Serial Number: [id number]    : [id number]    Basic Metabolic Panel [[id number]]  (Abnormal) Collected:  07/08/17 0411    Specimen:  Blood Updated:  07/08/17 0451     Glucose 131 (H) mg/dL      BUN 11 mg/dL      Creatinine 0.98 mg/dL      Sodium 143 mmol/L      Potassium 3.5 mmol/L      Chloride 111 (H) mmol/L      CO2 21.0 (L) mmol/L      Calcium 8.0 (L) mg/dL      eGFR Non African Amer 81 mL/min/1.73      BUN/Creatinine Ratio 11.2     Anion Gap 11.0 mmol/L     Narrative:       GFR Normal >60  Chronic Kidney Disease <60  Kidney Failure <15    CBC & Differential [[id number]] Collected:  07/08/17 1057    Specimen:  Blood Updated:  07/08/17 1118    Narrative:       The following orders were created for panel order CBC & Differential.  Procedure                               Abnormality         Status                     ---------                               -----------         ------                     CBC Auto Differential[[id number]]        Abnormal            Final result                 Please view results for these tests on the individual orders.    CBC Auto Differential [[id number]]  (Abnormal) Collected:  07/08/17 1057    Specimen:  Blood from Arm, Right Updated:  07/08/17 1118     WBC 12.30 (H) 10*3/mm3      RBC 3.49 (L) 10*6/mm3      Hemoglobin 10.4 (L) g/dL      Hematocrit 31.5 (L) %      MCV 90.3 fL      MCH 29.8 pg      MCHC 33.0 g/dL      RDW 14.6 %      RDW-SD 48.3 fl      MPV 9.8 fL      Platelets 199 10*3/mm3      Neutrophil % 71.1 %      Lymphocyte % 11.7 (L) %      Monocyte % 13.4 (H) %      Eosinophil % 2.6 %      Basophil % 0.2 %      Immature Grans % 1.0 %      Neutrophils, Absolute 8.75 (H) 10*3/mm3      Lymphocytes, Absolute 1.44 10*3/mm3      Monocytes, Absolute 1.65 (H)  10*3/mm3      Eosinophils, Absolute 0.32 10*3/mm3      Basophils, Absolute 0.02 10*3/mm3      Immature Grans, Absolute 0.12 (H) 10*3/mm3     Blood Gas, Arterial [[id number]] Collected:  07/09/17 0159    Specimen:  Arterial Blood Updated:  07/09/17 0203     Site Arterial: right radial     Gera's Test --      Documented in Rapid Comm        pH, Arterial 7.417 pH units      pCO2, Arterial 36.3 mm Hg      pO2, Arterial 80.8 mm Hg      HCO3, Arterial 22.9 mmol/L      Base Excess, Arterial -1.1 mmol/L      O2 Saturation, Arterial 96.2 %      O2 Saturation Calculated 96.2 %      Barometric Pressure for Blood Gas -- mmHg       Component not reported at this site.        Modality Ventilator     FIO2 30 %      Ventilator Mode Assist     Rate 10.0 Breaths/minute      PEEP 5.0     Vent CPAP/PEEP 5.0    Narrative:       Serial Number: [id number]    : [id number]    Basic Metabolic Panel [[id number]]  (Abnormal) Collected:  07/09/17 0348    Specimen:  Blood Updated:  07/09/17 0406     Glucose 108 (H) mg/dL      BUN 11 mg/dL      Creatinine 0.91 mg/dL      Sodium 143 mmol/L      Potassium 3.7 mmol/L      Chloride 111 (H) mmol/L      CO2 22.0 (L) mmol/L      Calcium 8.0 (L) mg/dL      eGFR Non African Amer 88 mL/min/1.73      BUN/Creatinine Ratio 12.1     Anion Gap 10.0 mmol/L     Narrative:       GFR Normal >60  Chronic Kidney Disease <60  Kidney Failure <15    Urinalysis With / Culture If Indicated [[id number]]  (Abnormal) Collected:  07/09/17 1100    Specimen:  Urine from Urine, Clean Catch Updated:  07/09/17 1109     Color, UA Dark Yellow (A)     Appearance, UA Clear     pH, UA 5.5     Specific Gravity, UA 1.022     Glucose, UA Negative     Ketones, UA Negative     Bilirubin, UA Negative     Blood, UA Negative     Protein, UA Negative     Leuk Esterase, UA Negative     Nitrite, UA Negative     Urobilinogen, UA 1.0 E.U./dL    Narrative:       Urine microscopic not indicated.    Blood Gas, Arterial [[id number]]  (Abnormal) Collected:   07/10/17 0255    Specimen:  Arterial Blood Updated:  07/10/17 0259     Site Arterial: right radial     Gera's Test --      Documented in Rapid Comm        pH, Arterial 7.443 pH units      pCO2, Arterial 32.9 (L) mm Hg      pO2, Arterial 55.9 (L) mm Hg      HCO3, Arterial 22.0 mmol/L      Base Excess, Arterial -1.2 mmol/L      O2 Saturation, Arterial 90.6 (L) %      O2 Saturation Calculated 90.6 (L) %      Barometric Pressure for Blood Gas -- mmHg       Component not reported at this site.        Modality Ventilator     FIO2 30 %      Rate 10.0 Breaths/minute      PEEP 5.0     Vent CPAP/PEEP 5.0    Narrative:       Serial Number: [id number]    : [id number]    Comprehensive Metabolic Panel [[id number]]  (Abnormal) Collected:  07/10/17 0338    Specimen:  Blood Updated:  07/10/17 0404     Glucose 182 (H) mg/dL      BUN 13 mg/dL      Creatinine 0.98 mg/dL      Sodium 144 mmol/L      Potassium 4.1 mmol/L      Chloride 111 (H) mmol/L      CO2 24.0 mmol/L      Calcium 8.6 mg/dL      Total Protein 6.5 g/dL      Albumin 3.40 (L) g/dL      ALT (SGPT) 42 U/L      AST (SGOT) 51 (H) U/L      Alkaline Phosphatase 155 (H) U/L      Total Bilirubin 0.5 mg/dL      eGFR Non African Amer 81 mL/min/1.73      Globulin 3.1 gm/dL      A/G Ratio 1.1 g/dL      BUN/Creatinine Ratio 13.3     Anion Gap 9.0 mmol/L     Respiratory Culture [[id number]]  (Abnormal)  (Susceptibility) Collected:  07/08/17 0403    Specimen:  Sputum from NT Suction Updated:  07/10/17 0744     Respiratory Culture Heavy growth (4+) Staphylococcus aureus (A)     BETA LACTAMASE Positive     Gram Stain Result Greater than 25 WBCs per low power field      Many (4+) Mixed gram positive evelio    Narrative:       For Staphylococcus, penicillin-resistant, oxacillin-susceptible strains are resistant to B-lactamase labile penicillins but susceptible to other B-lactamase stable penicillins, B-lactamase inhibitor combinations, relavant cephems, and carbapenems.    Susceptibility       Staphylococcus aureus     [place]     Clindamycin <=0.25 ug/ml Susceptible     Erythromycin <=0.25 ug/ml Susceptible     Gentamicin <=0.5 ug/ml Susceptible     Inducible Clindamycin Resistance NEG  Negative     Levofloxacin <=0.12 ug/ml Susceptible  [1]      Oxacillin 0.5 ug/ml Susceptible     Penicillin G >=0.5 ug/ml Resistant     Tetracycline <=1 ug/ml Susceptible     Trimethoprim + Sulfamethoxazole <=10 ug/ml Susceptible     Vancomycin <=0.5 ug/ml Susceptible            [1]   Staphylococcus species may develop resistance during prolonged therapy with quinolones. Isolates that are initially susceptible may become resistant within three to four days after initiation of therapy. Testing of repeat isolates may be warranted.              Susceptibility Comments     Staphylococcus aureus      This isolate does not demonstrate inducible clindamycin resistance in vitro.               Vancomycin, Trough [[id number]]  (Normal) Collected:  07/10/17 0830    Specimen:  Blood Updated:  07/10/17 0906     Vancomycin Trough 12.99 mcg/mL     Basic Metabolic Panel [[id number]]  (Abnormal) Collected:  07/11/17 0142    Specimen:  Blood Updated:  07/11/17 0222     Glucose 143 (H) mg/dL      BUN 21 mg/dL      Creatinine 1.07 mg/dL      Sodium 147 (H) mmol/L      Potassium 3.7 mmol/L      Chloride 109 mmol/L      CO2 25.0 mmol/L      Calcium 8.9 mg/dL      eGFR Non African Amer 73 mL/min/1.73      BUN/Creatinine Ratio 19.6     Anion Gap 13.0 mmol/L     Narrative:       GFR Normal >60  Chronic Kidney Disease <60  Kidney Failure <15    Blood Gas, Arterial [[id number]]  (Abnormal) Collected:  07/11/17 0327    Specimen:  Arterial Blood Updated:  07/11/17 0330     Site Arterial: right radial     Gera's Test --      Documented in Rapid Comm        pH, Arterial 7.495 (H) pH units      pCO2, Arterial 31.1 (L) mm Hg      pO2, Arterial 76.1 (L) mm Hg      HCO3, Arterial 23.4 mmol/L      Base Excess, Arterial 1.1 mmol/L      O2 Saturation, Arterial 96.3  %      O2 Saturation Calculated 96.3 %      Barometric Pressure for Blood Gas -- mmHg       Component not reported at this site.        Modality Ventilator     FIO2 40 %      Ventilator Mode AC     Rate 10.0 Breaths/minute      PEEP 10.0     Vent CPAP/PEEP 10.0    Narrative:       Serial Number: [id number]    : [id number]    Blood Gas, Arterial [[id number]]  (Abnormal) Collected:  07/12/17 0310    Specimen:  Arterial Blood Updated:  07/12/17 0314     Site Arterial: right radial     Gera's Test --      Documented in Rapid Comm        pH, Arterial 7.498 (H) pH units      pCO2, Arterial 36.6 mm Hg      pO2, Arterial 76.8 (L) mm Hg      HCO3, Arterial 27.8 (H) mmol/L      Base Excess, Arterial 4.6 (H) mmol/L      O2 Saturation, Arterial 96.4 %      O2 Saturation Calculated 96.4 %      Barometric Pressure for Blood Gas -- mmHg       Component not reported at this site.        Modality Ventilator     FIO2 40 %      Ventilator Mode AC     Rate 10.0 Breaths/minute      PEEP 10.0     Vent CPAP/PEEP 10.0    Narrative:       Serial Number: [id number]    : [id number]    Basic Metabolic Panel [[id number]]  (Abnormal) Collected:  07/12/17 0226    Specimen:  Blood Updated:  07/12/17 0334     Glucose 147 (H) mg/dL      BUN 29 (H) mg/dL      Creatinine 1.06 mg/dL      Sodium 147 (H) mmol/L      Potassium 3.3 (L) mmol/L      Chloride 106 mmol/L      CO2 28.0 mmol/L      Calcium 9.0 mg/dL      eGFR Non African Amer 74 mL/min/1.73      BUN/Creatinine Ratio 27.4 (H)     Anion Gap 13.0 mmol/L     Narrative:       GFR Normal >60  Chronic Kidney Disease <60  Kidney Failure <15    CBC & Differential [[id number]] Collected:  07/12/17 0226    Specimen:  Blood Updated:  07/12/17 0416    Narrative:       The following orders were created for panel order CBC & Differential.  Procedure                               Abnormality         Status                     ---------                               -----------         ------                      Manual Differential[[id number]]          Abnormal            Final result               Scan Slide[[id number]]                                       Final result               CBC Auto Differential[[id number]]        Abnormal            Final result                 Please view results for these tests on the individual orders.    CBC Auto Differential [[id number]]  (Abnormal) Collected:  07/12/17 0226    Specimen:  Blood Updated:  07/12/17 0416     WBC 16.64 (H) 10*3/mm3      RBC 3.49 (L) 10*6/mm3      Hemoglobin 10.4 (L) g/dL      Hematocrit 31.3 (L) %      MCV 89.7 fL      MCH 29.8 pg      MCHC 33.2 g/dL      RDW 14.7 %      RDW-SD 47.8 fl      MPV 9.6 fL      Platelets 349 10*3/mm3     Scan Slide [[id number]] Collected:  07/12/17 0226    Specimen:  Blood Updated:  07/12/17 0416     Scan Slide --      See Manual Differential Results       Manual Differential [[id number]]  (Abnormal) Collected:  07/12/17 0226    Specimen:  Blood Updated:  07/12/17 0416     Neutrophil % 61.0 %      Lymphocyte % 23.0 %      Monocyte % 3.0 (L) %      Bands %  7.0 %      Metamyelocyte % 2.0 (H) %      Myelocyte % 3.0 (H) %      Atypical Lymphocyte % 1.0 %      Neutrophils Absolute 11.32 (H) 10*3/mm3      Lymphocytes Absolute 3.83 10*3/mm3      Monocytes Absolute 0.50 10*3/mm3      Hypochromia Slight/1+     WBC Morphology Normal     Platelet Morphology Normal    Blood Gas, Arterial [[id number]]  (Abnormal) Collected:  07/13/17 0317    Specimen:  Arterial Blood Updated:  07/13/17 0320     Site Arterial: right radial     Gera's Test --      Documented in Rapid Comm        pH, Arterial 7.441 pH units      pCO2, Arterial 38.8 mm Hg      pO2, Arterial 105.3 (H) mm Hg      HCO3, Arterial 25.8 mmol/L      Base Excess, Arterial 1.8 mmol/L      O2 Saturation, Arterial 98.0 %      O2 Saturation Calculated 98.0 %      Barometric Pressure for Blood Gas -- mmHg       Component not reported at this site.        Modality Ventilator     FIO2 40 %       Ventilator Mode AC     Rate 10.0 Breaths/minute      PEEP 10.0     Vent CPAP/PEEP 10.0    Narrative:       Serial Number: [id number]    : [id number]    Blood Gas, Arterial [[id number]]  (Abnormal) Collected:  07/14/17 0225    Specimen:  Arterial Blood Updated:  07/14/17 0229     Site Arterial: right radial     Gera's Test --      Documented in Rapid Comm        pH, Arterial 7.444 pH units      pCO2, Arterial 33.7 (L) mm Hg      pO2, Arterial 75.2 (L) mm Hg      HCO3, Arterial 22.6 mmol/L      Base Excess, Arterial -0.7 mmol/L      O2 Saturation, Arterial 95.7 %      O2 Saturation Calculated 95.7 %      Barometric Pressure for Blood Gas -- mmHg       Component not reported at this site.        Modality Ventilator     FIO2 40 %      Ventilator Mode Assist     Rate 10.0 Breaths/minute      PEEP 8.0     Vent CPAP/PEEP 8.0    Narrative:       Serial Number: [id number]    : [id number]    CBC & Differential [[id number]] Collected:  07/14/17 0257    Specimen:  Blood Updated:  07/14/17 0317    Narrative:       The following orders were created for panel order CBC & Differential.  Procedure                               Abnormality         Status                     ---------                               -----------         ------                     CBC Auto Differential[[id number]]        Abnormal            Final result                 Please view results for these tests on the individual orders.    CBC Auto Differential [[id number]]  (Abnormal) Collected:  07/14/17 0257    Specimen:  Blood Updated:  07/14/17 0317     WBC 16.14 (H) 10*3/mm3      RBC 3.64 (L) 10*6/mm3      Hemoglobin 10.9 (L) g/dL      Hematocrit 32.8 (L) %      MCV 90.1 fL      MCH 29.9 pg      MCHC 33.2 g/dL      RDW 14.5 %      RDW-SD 47.7 fl      MPV 9.8 fL      Platelets 400 10*3/mm3      Neutrophil % 68.5 %      Lymphocyte % 18.7 %      Monocyte % 8.0 %      Eosinophil % 0.6 %      Basophil % 0.2 %      Immature Grans % 4.0 %      Neutrophils, Absolute  11.04 (H) 10*3/mm3      Lymphocytes, Absolute 3.02 10*3/mm3      Monocytes, Absolute 1.29 10*3/mm3      Eosinophils, Absolute 0.10 10*3/mm3      Basophils, Absolute 0.04 10*3/mm3      Immature Grans, Absolute 0.65 (H) 10*3/mm3     Basic Metabolic Panel [[id number]]  (Abnormal) Collected:  07/14/17 0257    Specimen:  Blood Updated:  07/14/17 0337     Glucose 105 (H) mg/dL      BUN 24 (H) mg/dL      Creatinine 0.99 mg/dL      Sodium 147 (H) mmol/L      Potassium 2.9 (C) mmol/L      Chloride 111 (H) mmol/L      CO2 24.0 mmol/L      Calcium 9.1 mg/dL      eGFR Non African Amer 80 mL/min/1.73      BUN/Creatinine Ratio 24.2     Anion Gap 12.0 mmol/L     Narrative:       GFR Normal >60  Chronic Kidney Disease <60  Kidney Failure <15    CBC (No Diff) [[id number]]  (Abnormal) Collected:  07/15/17 0143    Specimen:  Blood Updated:  07/15/17 0217     WBC 16.11 (H) 10*3/mm3      RBC 3.76 (L) 10*6/mm3      Hemoglobin 11.2 (L) g/dL      Hematocrit 33.8 (L) %      MCV 89.9 fL      MCH 29.8 pg      MCHC 33.1 g/dL      RDW 14.4 %      RDW-SD 47.0 fl      MPV 9.8 fL      Platelets 366 10*3/mm3     Blood Gas, Arterial [[id number]] Collected:  07/15/17 0217    Specimen:  Arterial Blood Updated:  07/15/17 0223     Site Arterial: right radial     Gera's Test --      Documented in Rapid Comm        pH, Arterial 7.435 pH units      pCO2, Arterial 35.4 mm Hg      pO2, Arterial 85.1 mm Hg      HCO3, Arterial 23.2 mmol/L      Base Excess, Arterial -0.4 mmol/L      O2 Saturation, Arterial 96.8 %      O2 Saturation Calculated 96.8 %      Barometric Pressure for Blood Gas -- mmHg       Component not reported at this site.        Modality Cool Aerosol     Flow Rate 35.00 lpm     Narrative:       Serial Number: [id number]    : [id number]    Basic Metabolic Panel [[id number]]  (Abnormal) Collected:  07/15/17 0143    Specimen:  Blood Updated:  07/15/17 0227     Glucose 93 mg/dL      BUN 18 mg/dL      Creatinine 0.90 mg/dL      Sodium 146 (H) mmol/L       Potassium 3.4 (L) mmol/L      Chloride 110 mmol/L      CO2 25.0 mmol/L      Calcium 8.8 mg/dL      eGFR Non African Amer 89 mL/min/1.73      BUN/Creatinine Ratio 20.0     Anion Gap 11.0 mmol/L     Narrative:       GFR Normal >60  Chronic Kidney Disease <60  Kidney Failure <15    Magnesium [[id number]]  (Normal) Collected:  07/15/17 0143    Specimen:  Blood Updated:  07/15/17 0227     Magnesium 2.1 mg/dL     CBC (No Diff) [[id number]]  (Abnormal) Collected:  07/16/17 0230    Specimen:  Blood Updated:  07/16/17 0253     WBC 19.16 (H) 10*3/mm3      RBC 3.84 (L) 10*6/mm3      Hemoglobin 11.3 (L) g/dL      Hematocrit 34.0 (L) %      MCV 88.5 fL      MCH 29.4 pg      MCHC 33.2 g/dL      RDW 14.4 %      RDW-SD 46.8 fl      MPV 9.8 fL      Platelets 357 10*3/mm3     Basic Metabolic Panel [[id number]]  (Abnormal) Collected:  07/16/17 0230    Specimen:  Blood Updated:  07/16/17 0305     Glucose 131 (H) mg/dL      BUN 18 mg/dL      Creatinine 0.95 mg/dL      Sodium 143 mmol/L      Potassium 3.6 mmol/L      Chloride 110 mmol/L      CO2 23.0 (L) mmol/L      Calcium 8.8 mg/dL      eGFR Non African Amer 84 mL/min/1.73      BUN/Creatinine Ratio 18.9     Anion Gap 10.0 mmol/L     Narrative:       GFR Normal >60  Chronic Kidney Disease <60  Kidney Failure <15    CBC (No Diff) [[id number]]  (Abnormal) Collected:  07/17/17 0343    Specimen:  Blood Updated:  07/17/17 0442     WBC 23.30 (H) 10*3/mm3      RBC 3.05 (L) 10*6/mm3      Hemoglobin 9.2 (L) g/dL      Hematocrit 27.4 (L) %      MCV 89.8 fL      MCH 30.2 pg      MCHC 33.6 g/dL      RDW 14.7 %      RDW-SD 48.4 fl      MPV 10.2 fL      Platelets 493 (H) 10*3/mm3     Basic Metabolic Panel [[id number]]  (Abnormal) Collected:  07/17/17 0343    Specimen:  Blood Updated:  07/17/17 0513     Glucose 86 mg/dL      BUN 17 mg/dL       Specimen hemolyzed. Results may be affected.        Creatinine 0.95 mg/dL      Sodium 142 mmol/L      Potassium 3.9 mmol/L       Specimen hemolyzed.  Results may be  affected.        Chloride 109 mmol/L      CO2 22.0 (L) mmol/L      Calcium 9.3 mg/dL      eGFR Non African Amer 84 mL/min/1.73      BUN/Creatinine Ratio 17.9     Anion Gap 11.0 mmol/L     Narrative:       GFR Normal >60  Chronic Kidney Disease <60  Kidney Failure <15    CBC & Differential [[id number]] Collected:  07/18/17 0510    Specimen:  Blood Updated:  07/18/17 0551    Narrative:       The following orders were created for panel order CBC & Differential.  Procedure                               Abnormality         Status                     ---------                               -----------         ------                     CBC Auto Differential[[id number]]        Abnormal            Final result                 Please view results for these tests on the individual orders.    CBC Auto Differential [[id number]]  (Abnormal) Collected:  07/18/17 0510    Specimen:  Blood Updated:  07/18/17 0551     WBC 14.20 (H) 10*3/mm3      RBC 4.00 (L) 10*6/mm3      Hemoglobin 11.8 (L) g/dL      Hematocrit 36.0 (L) %      MCV 90.0 fL      MCH 29.5 pg      MCHC 32.8 (L) g/dL      RDW 14.4 %      RDW-SD 47.6 fl      MPV 10.4 fL      Platelets 413 (H) 10*3/mm3      Neutrophil % 73.2 %      Lymphocyte % 13.7 (L) %      Monocyte % 9.9 %      Eosinophil % 2.3 %      Basophil % 0.2 %      Immature Grans % 0.7 %      Neutrophils, Absolute 10.41 (H) 10*3/mm3      Lymphocytes, Absolute 1.94 10*3/mm3      Monocytes, Absolute 1.40 (H) 10*3/mm3      Eosinophils, Absolute 0.32 10*3/mm3      Basophils, Absolute 0.03 10*3/mm3      Immature Grans, Absolute 0.10 (H) 10*3/mm3     Basic Metabolic Panel [[id number]]  (Abnormal) Collected:  07/18/17 0510    Specimen:  Blood Updated:  07/18/17 0600     Glucose 85 mg/dL      BUN 22 (H) mg/dL      Creatinine 1.17 mg/dL      Sodium 144 mmol/L      Potassium 3.9 mmol/L      Chloride 108 mmol/L      CO2 22.0 (L) mmol/L      Calcium 9.7 mg/dL      eGFR Non African Amer 66 mL/min/1.73      BUN/Creatinine Ratio  18.8     Anion Gap 14.0 (H) mmol/L     Narrative:       GFR Normal >60  Chronic Kidney Disease <60  Kidney Failure <15    Uric Acid [[id number]]  (Normal) Collected:  07/18/17 0510    Specimen:  Blood Updated:  07/18/17 0751     Uric Acid 7.1 mg/dL     CBC & Differential [[id number]] Collected:  07/19/17 0550    Specimen:  Blood Updated:  07/19/17 0557    Narrative:       The following orders were created for panel order CBC & Differential.  Procedure                               Abnormality         Status                     ---------                               -----------         ------                     CBC Auto Differential[[id number]]        Abnormal            Final result                 Please view results for these tests on the individual orders.    CBC Auto Differential [[id number]]  (Abnormal) Collected:  07/19/17 0550    Specimen:  Blood Updated:  07/19/17 0557     WBC 8.50 10*3/mm3      RBC 3.55 (L) 10*6/mm3      Hemoglobin 10.3 (L) g/dL      Hematocrit 32.0 (L) %      MCV 90.1 fL      MCH 29.0 pg      MCHC 32.2 (L) g/dL      RDW 14.5 %      RDW-SD 47.8 fl      MPV 9.9 fL      Platelets 357 10*3/mm3      Neutrophil % 61.1 %      Lymphocyte % 19.8 %      Monocyte % 13.9 (H) %      Eosinophil % 4.0 %      Basophil % 0.4 %      Immature Grans % 0.8 %      Neutrophils, Absolute 5.20 10*3/mm3      Lymphocytes, Absolute 1.68 10*3/mm3      Monocytes, Absolute 1.18 10*3/mm3      Eosinophils, Absolute 0.34 10*3/mm3      Basophils, Absolute 0.03 10*3/mm3      Immature Grans, Absolute 0.07 (H) 10*3/mm3     Basic Metabolic Panel [[id number]]  (Abnormal) Collected:  07/19/17 0550    Specimen:  Blood Updated:  07/19/17 0618     Glucose 87 mg/dL      BUN 21 mg/dL      Creatinine 1.10 mg/dL      Sodium 144 mmol/L      Potassium 4.4 mmol/L      Chloride 112 (H) mmol/L      CO2 20.0 (L) mmol/L      Calcium 9.2 mg/dL      eGFR Non African Amer 71 mL/min/1.73      BUN/Creatinine Ratio 19.1     Anion Gap 12.0 mmol/L      Narrative:       GFR Normal >60  Chronic Kidney Disease <60  Kidney Failure <15        Hospital Course:  The patient is a 52 y.o. male who presented to [facility] with Complaints of a seizure and elevated blood pressure.  He also complained of his tongue have been swelling prior to the seizure activity and then additionally biting his tongue.  He was found to have posterior reversible encephalopathy syndrome on an MRI done in the emergency department.  He was admitted to the intensive care unit and neurology consulted as well.  We were initially very tactful about his blood pressure control.  We did not want him to strictly to because of his PRES and malignant hypertension.  Dr. Campos placed him on Keppra.  The patient on my first day with him, the day of admission, denied any significant alcohol intake.  He told me that he had a few beers on occasion.  However, by hospital day #2 the patient was tremulous and displaying signs of alcohol withdrawal.  His daughter did endorse that he indeed did drink significantly more alcohol than what he had admitted to.  He was placed on scheduled Valium and as needed Ativan.  Unfortunately, he became increasingly combative and agitated.  He developed delirium tremens.  Dr. Campos and I felt that we had no choice but to place him on ventilatory support to get him through his withdrawal period.     His intensive care course was complicated by possible ventilator associated pneumonia, Clostridium difficile colitis, and he eventually required tracheostomy placement to wean from ventilatory support.  He was fortunately able to do this and ultimately rebounded very nicely after coming off of ventilatory support late in the evening of July 14th. He is now on room air and doing very well with his tracheostomy. ENT hopefully can consider decannulation in the near future.  They will see him as an outpatient setting.  He has been capping his trach and also using a Passy-Ceci  valve.    His blood pressure is much improved over last 48-72 hours. Still a few hypertensive numbers, but overall doing much better. Changed metoprolol to carvedilol on 7/14. Placed on losartan on 7/14 and will increase today. There was some question as to whether or not he had tongue swelling on lisinopril. However, angiotensin receptor blockers should be okay as he needs these for blood pressure control. Continue amlodipine, Change clonidine patch back to oral at this point.       He will remain on Keppra for at least 6-8 weeks per the neurology service.  I will decrease his dose to 500 mg twice per day for now and continue.  He will follow-up with neurology in 3 weeks and have an outpatient MRI.  Also instructed he and his family that he is not to drive for 90 days post his seizure      He had been on broad-spectrum antibiotics for potential pneumonia since the 11th. The Staph in his sputum was MSSA. Discontinued vancomycin on 7/14. Discontinued Zosyn 7/16. Levaquin discontinued on 7/18, received 7 days. He is also completing Flagyl Clostridium difficile toxin present in his stool. Today is day 13, will treat for 14. No recent loose stools. His white blood cell count has finally normalized today. No signs of ongoing infections and no fevers.      Placed on indomethacin and colchicine on 7/17 for gout.  He has done very well with this and at this point in time can start taking them on an as-needed basis.  He is going to discuss with primary care next week about when to reinitiate allopurinol.  His uric acid was 7.1.  He knows to not take allopurinol in the acute phase of acute gout flare.      Speech therapy cleared for regular foods with thin liquids after a dysphagia study on 7/17.  He did well with physical and occupational therapy.  At this point in time, he is ambulatory and able to conduct all of his own activities of daily living.  For safety purposes, he is going to be staying with his mother for at least  "the first future.    Condition on Discharge: Good.     Physical Exam on Discharge:  /86  Pulse 96  Temp 97.2 °F (36.2 °C)  Resp 20  Ht 67\" (170.2 cm)  Wt 215 lb 8 oz (97.8 kg)  SpO2 99%  BMI 33.75 kg/m2  Physical Exam  Constitutional: No distress.   Seen and discussed with his nurse, Elin. His sister and mother are present. He is calm, polite, conversant. PICC has been removed.    Head: Normocephalic and atraumatic.   Eyes: Pupils are equal, round, and reactive to light.   Neck: Neck supple. No JVD present. Trach clean and with PMV.   Cardiovascular: Normal rate and regular rhythm.   Pulmonary/Chest: Effort normal and breath sounds normal. On room air via trach.    Abdominal: Soft. Bowel sounds are normal.   Musculoskeletal: He exhibits edema (trace).  Improved calor and erythema of the left great toe at the MTP joint.   Neurological: He is appropriate. Good strength. Ambulatory.   Skin: Skin is warm and dry.     Discharge Disposition:  Home to his mother's house.     Discharge Medications:   Nishant Savage   Home Medication Instructions EMMETT:[id number]    Printed on:07/19/17 1012   Medication Information                      amLODIPine (NORVASC) 10 MG tablet  Take 1 tablet by mouth Daily.             carvedilol (COREG) 25 MG tablet  Take 1 tablet by mouth Every 12 (Twelve) Hours.             cloNIDine (CATAPRES) 0.1 MG tablet  Take 0.1 mg by mouth every night.             colchicine 0.6 MG tablet  Take 1 tablet by mouth Every 12 (Twelve) Hours As Needed for Muscle / Joint Pain.             fenofibrate (TRICOR) 145 MG tablet  Take 1 tablet by mouth Every Night.             HYDROcodone-acetaminophen (NORCO) 5-325 MG per tablet  Take 1 tablet by mouth Every 4 (Four) Hours As Needed for Moderate Pain  for up to 3 days.             indomethacin (INDOCIN) 50 MG capsule  Take 1 capsule by mouth 2 (Two) Times a Day As Needed for Mild Pain . Do not take for greater then 3-5 days.             levETIRAcetam " (KEPPRA) 500 MG tablet  Take 1 tablet by mouth Every 12 (Twelve) Hours.             losartan (COZAAR) 100 MG tablet  Take 1 tablet by mouth Daily.             metroNIDAZOLE (FLAGYL) 500 MG tablet  Take 1 tablet by mouth Every 8 (Eight) Hours. Indications: Infection Within the Abdomen             vardenafil (LEVITRA) 10 MG tablet  Take 1 tablet by mouth Daily As Needed for erectile dysfunction.               Discharge Diet:   Diet Instructions     Diet: Regular; Thin Liquids, No Restrictions       Discharge Diet:  Regular   Fluid Consistency:  Thin Liquids, No Restrictions               Activity at Discharge:   Activity Instructions     Activity as Tolerated           Other Instructions (Specify)       No driving for 90 days.               Follow-up Appointments:   1.  EPI [facility]ote in 1 week.  2.  Dr. Pierson to be determined.   3.  EPI Pardo in 3 weeks.    Test Results Pending at Discharge: None.    Jairon Perez DO  07/19/17  10:19 AM    Time: 40 minutes.            Electronically signed by Jairon Perez DO at 7/19/2017 10:35 AM

## 2017-07-19 NOTE — THERAPY DISCHARGE NOTE
Acute Care - Speech Language Pathology   Swallow Treatment Note/Discharge   [facility]     Patient Name: Nishant Savage  : 1965  MRN: [id number]  Today's Date: 2017  Onset of Illness/Injury or Date of Surgery Date: 17     Referring Physician: Dr. Zamarripa      Admit Date: 7/3/2017  Pt's trach now capped. Pt completed trials of thin liquids with no overt s/s of aspiration. SLP will sign off.  Daniela Heck CCC-SLP 2017 9:56 AM    Visit Dx:      ICD-10-CM ICD-9-CM   1. Oropharyngeal dysphagia R13.12 787.22   2. Seizures R56.9 780.39   3. Angioedema, sequela T78.3XXS 909.9   4. Alcohol withdrawal, with delirium F10.231 291.0   5. Impaired mobility Z74.09 799.89   6. Voice absence R49.1 784.41     Patient Active Problem List   Diagnosis   • Hyperlipidemia   • HTN (hypertension)   • Gout   • Anxiety   • Arthritis   • Erectile dysfunction   • Angio-edema   • Seizures   • HCAP (healthcare-associated pneumonia)             Adult Rehabilitation Note       17 0941 17 1549 17 1048    Rehab Assessment/Intervention    Discipline speech language pathologist  -MB physical therapy assistant  -MF physical therapy assistant  -    Document Type therapy note (daily note);discharge summary  -MB therapy note (daily note)  - therapy note (daily note)  -    Subjective Information no complaints;agree to therapy  -MB agree to therapy;complains of;pain  - agree to therapy;no complaints  -    Patient Effort, Rehab Treatment good  -MB      Precautions/Limitations  fall precautions   c-diff, trach  - fall precautions   c-diff, impulsive, trach, but able to verbalize  -    Recorded by [MB] Daniela Heck CCC-SLP [] Uma Restrepo PTA [] Uma Restrepo PTA    Pain Assessment    Pain Assessment  0-10  - No/denies pain  -    Pain Score  6  -MF     Post Pain Score  9  -     Pain Type  Acute pain  -     Pain Location  Foot  -     Pain Orientation  Right;Left  -      Pain Descriptors  Aching  -     Pain Frequency  Intermittent  -     Pain Intervention(s)  Repositioned  -     Response to Interventions  tolerated, notified nursing  -MF     Recorded by  [MF] Uma Restrepo PTA [MF] Uma Restrepo PTA    Additional 3 Communication Treatment    Additional 3 Progress discontinue achieved  -MB      Comments: Additional 3 Pt's trach capped  -MB      Recorded by [MB] Daniela Heck CCC-SLP      Dysphagia/Swallow Intervention    Dysphagia/Swallow Therapeutic Feed Pt completed trials of thin liquids with no overt s/s of aspiration. Discontinue - goal met  -MB      Recorded by [MB] Daniela Heck CCC-SLP      Bed Mobility, Assessment/Treatment    Bed Mobility, Assistive Device   bed rails  -    Bed Mob, Supine to Sit, [place]   conditional independence  -    Bed Mobility, Comment  up in chair  -     Recorded by  [MF] Uma Restrepo PTA [MF] Uma Restrepo PTA    Transfer Assessment/Treatment    Transfers, Sit-Stand [place]  independent  - independent  -    Transfers, Stand-Sit [place]  independent  - independent  -    Transfer, Comment  stood x 3  -MF stood x 4  -MF    Recorded by  [MF] Uma Restrepo PTA [MF] Uma Restrepo PTA    Gait Assessment/Treatment    Gait, [place] Level  stand by assist;contact guard assist  - stand by assist;contact guard assist  -    Gait, Assistive Device   rolling walker  -    Gait, Distance (Feet)  --   300' CGA and 125' SBA  - 200  -MF    Gait, Gait Deviations  mile decreased;decreased heel strike;step length decreased  - mile decreased;decreased heel strike;step length decreased  -    Gait, Safety Issues  balance decreased during turns;weight-shifting ability decreased  - balance decreased during turns;step length decreased  -    Gait, Impairments  impaired balance  - impaired balance  -    Gait, Comment  ambulated without rwx and did not have any LOB   - Pt. did well with rwx, but required it for decreased balance..  -    Recorded by  [] Uma Restrepo PTA [] Uma Restrepo PTA    Stairs Assessment/Treatment    Number of Stairs  9  -     Stairs, Handrail Location  left side (ascending)  -     Stairs, [place] Level  verbal cues required;contact guard assist  -     Stairs, Technique Used  step over step (ascending);step to step (descending)  -MF     Recorded by  [] Uma Restrepo PTA     Motor Skills/Interventions    Additional Documentation   Balance Skills Training (Group)  -    Recorded by   [] Uma Restrepo PTA    Balance Skills Training    Sitting-Level of Assistance   Independent  -    Sitting-Balance Support   Feet supported  -    Standing-Level of Assistance   Contact guard;Close supervision  -    Static Standing Balance Support   --   One UE at a time.  -    Standing-Balance Activities   Reaching for objects;Reaching across midline   standing leg exercises.  -    Standing Balance # of Minutes   8  -    Gait Balance-Level of Assistance  Close supervision;Contact guard  - Contact guard  -    Gait Balance Support  No upper extremity supported  - assistive device;Left upper extremity supported;Right upper extremity supported  -    Gait Balance Activities  backwards;side-stepping  -     Gait Balance # of Minutes  5  -     Recorded by  [] Uma Restrepo PTA [] Uma Restrepo PTA    Therapy Exercises    Bilateral Lower Extremities  AROM:;15 reps;10 reps;standing;heel raises;hip abduction/adduction;hip flexion;mini squats  - AROM:;20 reps;sitting;ankle pumps/circles;LAQ   Hip flexion and ABD/ADD 15 reps standing.  -    Recorded by  [] Uma Restrepo PTA [MF] Uma Restrepo PTA    Positioning and Restraints    Pre-Treatment Position  sitting in chair/recliner  - in bed  -    Post Treatment Position  chair  - chair  -    In Chair  notified nsg;sitting;call  light within reach;encouraged to call for assist;with family/caregiver  -MF sitting;call light within reach;encouraged to call for assist  -MF    Recorded by  [MF] Uma Restrepo PTA [MF] Uma Restrepo PTA      07/18/17 1034 07/17/17 1435 07/17/17 1321    Rehab Assessment/Intervention    Discipline speech language pathologist  -MB speech language pathologist  -MB     Document Type therapy note (daily note)  -MB therapy note (daily note)  -MB     Subjective Information no complaints;agree to therapy  -MB agree to therapy;pain  -MB     Patient Effort, Rehab Treatment good  -MB good  -MB     Recorded by [MB] LEONIDES Michaud [MB] LEONIDES Michaud     Pain Assessment    Pain Assessment  0-10  -MB     Pain Score  10  -MB     Pain Location  Foot  -MB     Pain Orientation  Left  -MB     Recorded by  [MB] LEONIDES Michaud     Additional 1 Communication Treatment    Additional 1 Progress 100%;discontinue achieved  -%;without cues  -MB     Recorded by [MB] LEONIDES Michaud [MB] LEONIDES Michaud     Additional 2 Communication Treatment    Additional 2 Progress 100%;discontinue achieved  -%;without cues  -MB     Recorded by [MB] LEONIDES Michaud [MB] LEONIDES Michaud     Additional 3 Communication Treatment    Additional 3 Progress  80%;without cues  -MB     Recorded by  [MB] LEONIDES Michaud     Dysphagia/Swallow Intervention    Dysphagia/Swallow Therapeutic Feed Pt completed trials of thin liquids with no overt s/s of aspiration.   -MB  Pt will tolerate trials of current diet with no s/s of aspiration.  -MB    Recorded by [MB] LEONIDES Michaud  [MB] LEONIDES Michaud      07/17/17 0820 07/16/17 1055       Rehab Assessment/Intervention    Discipline physical therapy assistant  -AE speech language pathologist  -KW     Document Type therapy note (daily note)  -AE      Subjective Information agree to  therapy;complains of;pain  -AE no complaints  -KW     Patient Effort, Rehab Treatment  excellent  -KW     Precautions/Limitations fall precautions   c-diff, trach  -AE      Recorded by [AE] Flor Camp PTA [KW] Daniela Bentley MS CCC-SLP     Pain Assessment    Pain Assessment 0-10  -AE      Pain Score 8  -AE      Pain Location Foot  -AE      Pain Orientation Left;Right  -AE      Pain Descriptors Aching;Burning  -AE      Pain Frequency Intermittent  -AE      Recorded by [AE] Flor Camp PTA      Dysphagia/Swallow Intervention    Dysphagia/Swallow Therapeutic Feed  Took trials of thin liquids.  Difficulty to determine safety.  No overt coughing but some wet voice vs harsh vocal quality.  Will complete Dysphagia Study Monday to determine safet diet consistency.  -KW     Recorded by  [KW] Daniela Bentley MS CCC-SLP     Bed Mobility, Assessment/Treatment    Bed Mobility, Comment up in chair   assisted with dressing  -AE      Recorded by [AE] Flor Camp PTA      Transfer Assessment/Treatment    Transfers, Sit-Stand [place] minimum assist (75% patient effort);verbal cues required  -AE      Transfers, Stand-Sit [place] contact guard assist;verbal cues required  -AE      Recorded by [AE] Flor Camp PTA      Gait Assessment/Treatment    Gait, [place] Level contact guard assist  -AE      Gait, Assistive Device rolling walker  -AE      Gait, Distance (Feet) 15   Pt in too much pain to walk  -AE      Gait, Gait Pattern Analysis swing-to gait  -AE      Gait, Gait Deviations bilateral:;mile decreased;antalgic  -AE      Gait, Safety Issues step length decreased  -AE      Gait, Impairments strength decreased  -AE      Recorded by [AE] Flor Camp PTA      Therapy Exercises    Bilateral Lower Extremities AROM:;10 reps;sitting  -AE      Recorded by [AE] Flor Camp PTA      Sensory Assessment/Intervention    Sensory Impairment hypersensitivity   B LE More on L  -AE      Recorded by [AE]  Flor Camp PTA      Positioning and Restraints    Pre-Treatment Position bathroom  -AE      Post Treatment Position bed  -AE      In Bed sitting EOB;call light within reach  -AE      Recorded by [AE] Flor Camp PTA        User Key  (r) = Recorded By, (t) = Taken By, (c) = Cosigned By    Initials Name Effective Dates    MB Yulisadeep Heck, [facility]-SLP 08/02/16 -     AE Flor Camp, PTA 06/22/15 -     KW Daniela Bentley, MS [facility]-SLP 08/02/16 -     MF Uma Restrepo, PTA 08/02/16 -               IP SLP Goals       07/19/17 0953 07/18/17 1056 07/17/17 1506    Safely Consume Diet    Safely Consume Diet- SLP, Date Goal Reviewed 07/19/17  -MB 07/18/17  -MB     Safely Consume Diet- SLP, Outcome goal met  -MB      SLP- Additional Goal 1    Additional Goal 1- SLP, Date Goal Reviewed 07/19/17  -MB 07/18/17  -MB 07/17/17  -MB    Additional Goal 1- SLP, Outcome goal met  -MB  goal ongoing  -MB      07/17/17 1411 07/16/17 1141 07/16/17 1139    Safely Consume Diet    Safely Consume Diet- SLP, Date Goal Reviewed 07/17/17  -MB      Safely Consume Diet- SLP, Outcome  goal ongoing  -KW     SLP- Additional Goal 1    Additional Goal 1- SLP, Date Established   07/16/17  -KW    Additional Goal 1- SLP, Time to Achieve   by discharge  -KW    Additional Goal 1- SLP, Activity Level   Patient will tolerate PMV without any s/s of dsitress and produce speech over background noise at 3 feet away.  -KW    Additional Goal 1- SLP, Additional Goal   Patient will independently place and remove PMV.  -KW    Additional Goal 1- SLP, Date Goal Reviewed   07/16/17  -KW      07/16/17 1131 07/15/17 1157       Safely Consume Diet    Safely Consume Diet- SLP, Date Established  07/15/17  -KW     Safely Consume Diet- SLP, Time to Achieve  by discharge  -KW     Safely Consume Diet- SLP, Additional Goal  Patient will tolerate upgraded PO trials with SLP w/ no s/s of aspiration.  -KW     Safely Consume Diet- SLP, Date Goal Reviewed 07/16/17  -KW  07/15/17  -ANASTACIO       User Key  (r) = Recorded By, (t) = Taken By, (c) = Cosigned By    Initials Name Provider Type    KASSIE Heck CCC-SLP Speech and Language Pathologist    ANASTACIO Bentley MS CCC-SLP Speech and Language Pathologist          EDUCATION  The patient has been educated in the following areas:   Dysphagia (Swallowing Impairment).    SLP Recommendation and Plan                       Anticipated Discharge Disposition: home                   Plan of Care Review  Plan Of Care Reviewed With: patient, family  Progress: progress toward functional goals as expected  Outcome Summary/Follow up Plan: Pt's trach now capped. Pt completed trials of thin liquids with no overt s/s of aspiration. SLP will sign off.       SLP Outcome Measures (last 72 hours)      SLP Outcome Measures       07/19/17 0900 07/17/17 1400 07/16/17 1100    SLP Outcome Measures    Outcome Measure Used? Adult NOMS  -MB Adult NOMS  -MB Adult NOMS  -KW    FCM Scores    FCM Chosen Swallowing  -MB Swallowing  -MB Voice Following Tracheostomy  -KW    Swallowing FCM Score 7  -MB 6  -MB     Voice Following Tracheostomy FCM Score   6  -KW      User Key  (r) = Recorded By, (t) = Taken By, (c) = Cosigned By    Initials Name Effective Dates    LEONIDES Steen 08/02/16 -     ANASTACIO Bentley MS CCC-ALEXIA 08/02/16 -              Time Calculation:         Time Calculation- SLP       07/19/17 0955          Time Calculation- SLP    SLP Start Time 0941  -MB      SLP Stop Time 0949  -MB      SLP Time Calculation (min) 8 min  -MB      SLP Received On 07/19/17  -MB        User Key  (r) = Recorded By, (t) = Taken By, (c) = Cosigned By    Initials Name Provider Type    KASSIE Heck CCC-SLP Speech and Language Pathologist          Therapy Charges for Today     Code Description Service Date Service Provider Modifiers Qty    15756122240 HC ST TREATMENT SPEECH 1 7/18/2017 LEONIDES Michaud GN 1    12742175971 HC ST SWALLOWING  PROJECTED 7/19/2017 LEONIDES Michaud, CH 1    98343765955 HC ST SWALLOWING DISCHARGE 7/19/2017 LEONIDES Michaud,  1    [id number] HC ST TREATMENT SWALLOW 1 7/19/2017 LEONIDES Michaud 1          SLP G-Codes  SLP NOMS Used?: Yes  Functional Limitations: Swallowing  Swallow Current Status (): At least 1 percent but less than 20 percent impaired, limited or restricted  Swallow Goal Status (): 0 percent impaired, limited or restricted  Swallow Discharge Status (): 0 percent impaired, limited or restricted  Other Speech-Language Pathology Functional Limitation Current Status (): At least 1 percent but less than 20 percent impaired, limited or restricted  Other Speech-Language Pathology Functional Limitation Goal Status (): At least 1 percent but less than 20 percent impaired, limited or restricted  Other Speech-Language Pathology Functional Limitation Discharge Status (): At least 1 percent but less than 20 percent impaired, limited or restricted    SLP Discharge Summary  Anticipated Discharge Disposition: home  Reason for Discharge: All goals achieved  Outcomes Achieved: Able to achieve all goals within established timeline    LEONIDES Davis  7/19/2017

## 2017-07-19 NOTE — DISCHARGE SUMMARY
[facility] Medicine Services  DISCHARGE SUMMARY       Date of Admission: 7/3/2017  Date of Discharge:  7/19/2017  Primary Care Physician: Linda Hoffman, DNP, APRN    Presenting Problem/History of Present Illness:  Tongue swelling which preceded possible seizure activity.     Final Discharge Diagnoses:  1. Possible angioedema related to lisinopril prior to presentation and tongue bite.  2. Malignant hypertension at presentation and still difficult to control.   3. Possible new onset seizure disorder versus convulsive syncope.  4. Posterior reversible encephalopathy syndrome on MRI.  5. Tongue bite with sublingual hematoma, much improved.  6. Tobacco abuse.  7. Obstructive sleep apnea, noncompliant with device.  8. Daily alcohol use with recent Delirium tremens requiring sedation and mechanical ventilation.  9. Hypokalemia, replaced.   10. Clostridium difficile colitis.  11. Possible lingual or sublingual infection, MSSA on culture.  12. Bilateral lower lobe infiltrates consistent with ventilator associated pneumonia combined with atelectasis.  13. Acute gouty arthritis of the left great toe.     Consults:   1. Dr. Campos and Dr. Arroyo with neurology.   2. Dr. Gallo and Dr. Petersen with pulmonary.   3. Dr. Pierson with ENT.   4. Dr. Minna Tran with general surgery.     Procedures Performed: Tracheostomy with Dr. Pierson. Central line placement by Dr. Minna Tran.      Pertinent Test Results:   Imaging Results (all)     Procedure Component Value Units Date/Time    XR Shoulder 2+ View Right [[id number]] Collected:  07/03/17 0913     Updated:  07/03/17 0918    Narrative:       EXAMINATION: XR SHOULDER 2+ VW RIGHT-     7/3/2017 10:02 AM EDT     HISTORY: Right shoulder pain.     Right shoulder, 3 views.     Old healed fracture of the right clavicle at the junction of the mid and  distal one third.  Bony hypertrophy of the distal clavicle with narrowing of the outlet  and  increased risk for impingement.  No AC joint separation.     Mild humeral head spurring.     High riding humeral head with the appearance of rotator cuff  insufficiency.     Summary:  1. Bony narrowing of the outlet based on the clavicle.  2. High riding humeral head.  3. MRI correlation recommended to assess the rotator cuff integrity..  This report was finalized on 07/03/2017 09:15 by Dr. Camilo Almodovar MD.    CT Head Without Contrast [[id number]] Collected:  07/03/17 1114     Updated:  07/03/17 1123    Narrative:       EXAMINATION: CT HEAD WO CONTRAST-      7/3/2017 9:43 AM EDT     HISTORY: seizures     In order to have a CT radiation dose as low as reasonably achievable  Automated Exposure Control was utilized for adjustment of the mA and/or  KV according to patient size.     DLP in mGycm= 821.     Ill-defined low density within both occipital lobes. No mass effect. No  acute hemorrhage.     Normal ventricle size.     No skull fracture is seen.  Clear paranasal sinuses.     Summary:  1. Bilateral occipital lobe hypodensity compatible with subacute  ischemic change. MRI correlation recommended.  2. Report called to Dr. Zamarripa in the emergency room at 11:15 AM.                                   This report was finalized on 07/03/2017 11:20 by Dr. Camilo Almodovar MD.    MRI Brain With & Without Contrast [[id number]] Collected:  07/03/17 1238     Updated:  07/03/17 1306    Narrative:       MRI BRAIN W WO CONTRAST- 7/3/2017 11:57 AM CDT     HISTORY: Ischemic changes; R13.12-Dysphagia, oropharyngeal phase     COMPARISON: None.       TECHNIQUE: Multiplanar imaging of the brain was performed in a routine  fashion before and after the intravenous injection of gadolinium  contrast.     FINDINGS:   Diffusion: No restriction of diffusion to suggest acute ischemia.     Midline structures: Nondisplaced.     Ventricles: Normal in configuration and symmetric in size.     Masses: No masses or mass effect.     Basilar cisterns:  Maintained.     Extra axial space: No abnormal extra-axial fluid.     Gray-white matter signal: There is abnormal signal. There is bilateral  subcortical signal on the T2 and FLAIR sequences involving the  cerebellum and posterior occipital and parietal lobes. There is no  associated enhancement. Most likely this is posterior reversible  encephalopathy syndrome. (P RES)     Cerebellum: Abnormal signal is noted in the cerebellum most likely  associated with the pres.     Brainstem: Normal.     Enhancement: No abnormal enhancement.     Other: Proximal cervical spinal cord is normal. Bilateral globes and  orbits are normal in appearance. Normal cerebrovascular flow voids  noted. Mucous cyst is noted in the right maxillary antrum mucosal  thickening is present in the left maxillary antrum       Impression:       1. Posterior reversible encephalopathy syndrome. Follow-up in 8 weeks is  suggested.         This report was finalized on 07/03/2017 12:53 by Dr. Adolph Echols MD.    XR Chest 1 View [[id number]] Collected:  07/04/17 1543     Updated:  07/04/17 1548    Narrative:       EXAMINATION: XR CHEST 1 VW- 7/4/2017 14:43 CST     HISTORY: Intubation     Comparison: None     Findings:  Endotracheal tube is in place with tip approximately 3 cm above the  jolly. Low lung volumes are present bilaterally. There is atelectasis  at the left lung base. The lung bases otherwise appear clear. The  pulmonary vasculature appears normal.       Impression:       Impression:  1. Apparent appropriate endotracheal tube position.  2. Left basilar atelectasis.  This report was finalized on 07/04/2017 15:45 by Dr. Driss Ward MD.    XR Chest 1 View [[id number]] Collected:  07/05/17 0724     Updated:  07/05/17 0728    Narrative:       Frontal supine radiograph of the chest 7/5/2017 3:10 AM CDT     History: Intubated Patient; R13.12-Dysphagia, oropharyngeal phase;  R56.9-Unspecified convulsions; T78.3XXS-Angioneurotic edema,  sequela;  F10.231-Alcohol dependence with withdrawal delirium     Comparison: 07/04/2017      Findings:   Lines and tubes are stable in position. No new opacities or  pneumothoraces are visualized in the chest. The cardiomediastinal  silhouette and pulmonary vascularity are unchanged.       No acute osseous or soft tissue abnormality is noted.        Impression:       Impression:   1. No significant interval change since previous exam.        This report was finalized on 07/05/2017 07:25 by Dr. Dilshad Silver MD.    XR Abdomen KUB [[id number]] Collected:  07/05/17 1207     Updated:  07/05/17 1210    Narrative:       EXAMINATION:   XR ABDOMEN KUB-  7/5/2017 12:07 PM CDT     HISTORY: NG tube placement     Single view the abdomen is obtained. Nasogastric tube is present with  the distal tip satisfactorily positioned in the fundus the stomach.  Bowel gas pattern is nonspecific.       Impression:       Satisfactory placement of nasogastric tube  This report was finalized on [id number] by Dr. Adolph Echols MD.    XR Chest 1 View [[id number]] Collected:  07/06/17 0718     Updated:  07/06/17 0722    Narrative:       Frontal supine radiograph of the chest 7/6/2017 3:04 AM CDT     History: Intubated Patient; R13.12-Dysphagia, oropharyngeal phase;  R56.9-Unspecified convulsions; T78.3XXS-Angioneurotic edema, sequela;  F10.231-Alcohol dependence with withdrawal delirium     Comparison: 07/05/2017      Findings:   The endotracheal tube is stable in position. An NG tube has been  advanced into the stomach.. No new opacities or pneumothoraces are  visualized in the chest. The cardiomediastinal silhouette and pulmonary  vascularity are unchanged.       No acute osseous or soft tissue abnormality is noted.        Impression:       Impression:   1. No significant interval change since previous exam.        This report was finalized on 07/06/2017 07:19 by Dr. Dilshad Silver MD.    XR Chest 1 View [[id number]] Collected:   07/07/17 0708     Updated:  07/07/17 0712    Narrative:       EXAMINATION:   XR CHEST 1 VW-  7/7/2017 7:08 AM CDT     HISTORY: Intubated patient     Single view the chest obtained. The lungs are clear. Cardiac silhouettes  mildly enlarged. Endotracheal tube nasogastric tube are satisfactorily  position.     This compared prior study 07/06/2017.     Obdulia and stable single view chest  This report was finalized on 07/07/2017 07:09 by Dr. Adolph Echols MD.    XR Chest 1 View [[id number]] Collected:  07/07/17 2137     Updated:  07/07/17 2153    Narrative:       EXAMINATION: XR CHEST 1 VW- 7/7/2017 9:31 PM CDT     HISTORY: Central line placement.     COMPARISON: 07/07/2017.     FINDINGS:  The left subclavian central line has been placed with tip in the  proximal SVC. No pneumothorax is identified. The endotracheal and  enteric tubes remain in place. Scattered areas of subsegmental  atelectasis are present. The heart is magnified but felt to be mildly  enlarged. The pulmonary vasculature is stable.       Impression:       Interval placement of left subclavian central line without  evidence of pneumothorax.  This report was finalized on 07/07/2017 21:50 by Dr. Driss Ward MD.    US Venous Doppler Upper Extremity Left (duplex) [[id number]] Collected:  07/08/17 0719     Updated:  07/08/17 0723    Narrative:       EXAMINATION:   US VENOUS DOPPLER UPPER EXTREMITY LEFT (DUPLEX)-   7/8/2017 7:19 AM CDT     HISTORY: Venous Doppler analysis of the left upper extremity.     Irineo vein demonstrates compression and color flow.     Left subclavian vein demonstrates color flow and augmentation.     Axillary vein demonstrates compression color flow and augmentation.     The brachial vein demonstrates color flow and compression. The left  basilic vein demonstrates lack of compression above the elbow. This lack  of compression is consistent with thrombus in the basilic vein. The  cephalic vein demonstrates color flow compression. The  radial vein  demonstrates color flow compression. The ulnar vein demonstrates color  flow compression.       Impression:       Thrombus is visualized in the left basilic vein above the  elbow.  This report was finalized on 07/08/2017 07:20 by Dr. Adolph Echols MD.    US Arterial Doppler Upper Extremity Left [[id number]] Collected:  07/08/17 0720     Updated:  07/08/17 0725    Narrative:       EXAMINATION:   US ARTERIAL DOPPLER UPPER EXTREMITY  LEFT-  7/8/2017 7:20  AM CDT     HISTORY: Left upper extremity duplex arterial analysis     On this examination the left subclavian artery demonstrates color flow  and peak systolic velocity 45 cm/s. Axillary artery demonstrates color  flow with a peak systolic velocity 104 cm/s. The brachial artery  demonstrates peak systolic velocity 130 cm/s with triphasic waveform.  The radial artery demonstrates color flow and triphasic waveform peak  systolic velocity 78 cm/s. The ulnar artery demonstrates triphasic  waveform color flow and peak systolic velocity 67 cm/s.       Impression:       Negative left upper extremity arterial duplex ultrasound  This report was finalized on 07/08/2017 07:22 by Dr. Adolph Echols MD.    XR Chest 1 View [[id number]] Collected:  07/08/17 0846     Updated:  07/08/17 0935    Narrative:       EXAMINATION:   XR CHEST 1 VW-  7/8/2017 8:45 AM CDT     HISTORY: Frontal supine radiograph of the chest 7/8/2017 3:22 AM CDT     COMPARISON: 07/07/2017.     FINDINGS:   The lungs are clear. The cardiomediastinal silhouette and pulmonary  vascularity are within normal limits. Endotracheal tube is  satisfactorily positioned. Left subclavian catheter is unchanged.     The osseous structures and surrounding soft tissues demonstrate no acute  abnormality.       Impression:       1. No radiographic evidence of acute cardiopulmonary process.     This report was finalized on 07/08/2017 09:31 by Dr. Adolph Echols MD.    CT Head With & Without Contrast [[id number]] Collected:   07/08/17 1233     Updated:  07/08/17 1239    Narrative:       CT BRAIN without and with contrast dated 7/8/2017 11:43 AM CDT     HISTORY: Possible sublingual abscess     COMPARISON: None      DOSE LENGTH PRODUCT: 1779 mGy cm     In order to have a CT radiation dose as low as reasonably achievable,  Automated Exposure Control was utilized for adjustment of the mA and/or  KV according to patient size.     TECHNIQUE: Serial axial tomographic images of the brain were obtained  without and with the use of intravenous contrast.      FINDINGS:   The midline structures are nondisplaced. The ventricles and basilar  cisterns are normal in size and configuration. There is no evidence of  intracranial hemorrhage or mass-effect. The gray-white matter  differentiation is maintained. The sulci have a normal configuration,  and there are no abnormal extra-axial fluid collections. The structures  of the posterior fossa are unremarkable.      The included orbits and their contents are unremarkable.    . The  visualized osseous structures and overlying soft tissues of the skull  and face are unremarkable.      There is no abnormal enhancement.       Impression:       1. Negative CT brain without and with contrast        This report was finalized on 07/08/2017 12:36 by Dr. Adolph Echols MD.    CT Soft Tissue Neck With & Without Contrast [[id number]] Collected:  07/08/17 1258     Updated:  07/08/17 1307    Narrative:       CT SOFT TISSUE NECK W WO CONTRAST- 7/8/2017 11:43 AM CDT     HISTORY: Possible sublingual or ligual abcess; R13.12-Dysphagia,  oropharyngeal phase; R56.9-Unspecified convulsions;  T78.3XXS-Angioneurotic edema, sequela; F10.231-Alcohol dependence with  withdrawal delirium      COMPARISON: NONE      DOSE LENGTH PRODUCT: 602 mGy cm. Automated exposure control was also  utilized to decrease patient radiation dose.     TECHNIQUE: Serial helical tomographic images of the neck were obtained  in the standard fashion  following the intravenous administration of  Isovue contrast.       FINDINGS:    Orbits, paranasal sinuses, and skull base: Normal     Nasopharynx: A nasogastric tube is present. Fluid is present in the  right maxillary antrum mucosal thickening is present left maxillary  antrum     Suprahyoid neck: Endotracheal tube is present. Pleural cavities obscured  by dental work. The base the tongue appears symmetric as visualized.  Submandibular glands are visualized. Sternomastoids sternocleidomastoid  muscles are symmetric.     Infrahyoid neck: Normal larynx, hypopharynx and supraglottis.     Thyroid: Normal.     Thoracic inlet: Pleural thickening is noted in the apex of the right  chest.  Lymph nodes: Normal. No lymphadenopathy.     Vascular structures: Normal.     Bones: Degenerative disc disease and degenerative spondylosis is present  in the cervical spine.     Other findings: None.       Impression:       1. No acute abnormalities identified on the enhanced CT soft tissue the  neck.  2. Nasogastric tube and endotracheal tube are present. Artifact from  dental work is noted.  3. Fluid in the right maxillary antrum is present  4. Degenerative spondylosis in the cervical spine        This report was finalized on 07/08/2017 13:04 by Dr. Adolph Echols MD.    XR Chest 1 View [[id number]] Collected:  07/09/17 0729     Updated:  07/09/17 0733    Narrative:       EXAMINATION:   XR CHEST 1 VW-  7/9/2017 7:29 AM CDT     HISTORY: Patient intubated     Single view the chest obtained. Atelectasis right lung base is noted.  Left lung is clear. Cardiac silhouettes mildly enlarged. Nasogastric  tube and endotracheal tube are satisfactorily position.       Impression:       Mild atelectasis right lung base slightly increased since  July 8  This report was finalized on 07/09/2017 07:30 by Dr. Adolph Echols MD.    XR Chest 1 View [[id number]] Collected:  07/10/17 0708     Updated:  07/10/17 0712    Narrative:       EXAMINATION:   XR  CHEST 1 VW-  7/10/2017 7:08 AM CDT     HISTORY: Intubated     Single view the chest obtained. Left lung is clear. Cardiac silhouettes  mildly enlarged unchanged from July 9. Left subclavian catheter is  present. Nasogastric tubes present. Mild atelectasis right lung base is  present.       Impression:       Atelectasis right lung base persists not significantly  changed from July 9.        2. Mild-to-moderate cardiomegaly also unchanged  This report was finalized on 07/10/2017 07:09 by Dr. Adolph Echols MD.    XR Abdomen KUB [[id number]] Collected:  07/10/17 1314     Updated:  07/10/17 1319    Narrative:       HISTORY: Abdomen distended and firm.     FINDINGS: KUB radiograph demonstrates a nonspecific bowel gas pattern.  There is noted to be a distended loop of what I suspect represents the  sigmoid colon with a sigmoid volvulus in the differential. I do not see  evidence of obstruction or pneumatosis. There is no evidence of  pneumoperitoneum.       Impression:       . Nonspecific bowel gas pattern with gas in both small bowel  and colon. There is some asymmetric distention of what appears to be a  loop of the sigmoid colon with a sigmoid volvulus a differential  although considered unlikely given the lack of distention of the more  proximal bowel and the patient's age. If symptoms are persistent follow  up with CT imaging could be obtained for further characterization.  This report was finalized on 07/10/2017 13:16 by Dr. Dilshad Silver MD.    CT Abdomen Pelvis Without Contrast [[id number]] Collected:  07/10/17 2035     Updated:  07/10/17 2047    Narrative:       EXAMINATION: CT ABDOMEN PELVIS WO CONTRAST- 7/10/2017 8:35 PM CDT     HISTORY: Abdominal distention and pain, possible sigmoid volvulus.     DOSE: 1821 mGycm (Automatic exposure control technique was implemented  in an effort to keep the radiation dose as low as possible without  compromising image quality)     REPORT: Spiral CT of the abdomen and  pelvis was performed without  intravenous or oral contrast from the lung bases through the pubic  symphysis. Reconstructed coronal and sagittal images are also reviewed.  Comparison: KUB on the same date. There is no previous CT. Lung windows  demonstrate consolidation of the lower lobe laterally with air  bronchograms, probable bilateral pneumonia mixed with atelectasis. There  are tiny bilateral pleural effusions. A nasogastric tube is present in  good position. There is mild cardiomegaly. The liver and spleen appear  homogeneous. The gallbladder is slightly contracted. Pancreas and  adrenal glands are within normal limits. No nephrolithiasis is seen and  there is no hydronephrosis. There is perinephric fat stranding  bilaterally, which is nonspecific. There is no evidence of bowel  obstruction or sigmoid volvulus. The sigmoid colon is redundant. There  is moderate sigmoid diverticulosis. There is nonspecific increased  attenuation of fat planes in the pelvis, including presacral fat planes  and fat planes at the level of the aortic bifurcation and inguinal  regions. There is also mildly increased attenuation of fat planes over  the flanks, slightly greater on the right. There are fat containing  inguinal hernias bilaterally. No intra-abdominal abscess or free air is  identified. The inflammatory changes do not appear to be associated with  the sigmoid colon. The appendix is normal. There is a small  fat-containing periumbilical hernia that measures 2.6 cm. Review of bone  windows is unremarkable.       Impression:       1. No evidence of bowel obstruction or sigmoid volvulus. There is  moderate sigmoid diverticulosis without acute diverticulitis.  2. Nonspecific mild fat stranding/inflammation within the  retroperitoneum and extraperitoneal fat planes in the pelvis, without  evidence of an abscess.  3. Extensive infiltrates in the lower lobes with air bronchograms  suspicious for acute lateral lower lobe  pneumonia probably combined with  atelectasis.        This report was finalized on 07/10/2017 20:44 by Dr. Marvin Mar MD.    XR Chest 1 View [[id number]] Collected:  07/11/17 0722     Updated:  07/11/17 0726    Narrative:       EXAMINATION:   XR CHEST 1 VW-  7/11/2017 7:22 AM CDT     HISTORY: Respiratory failure.     Frontal upright radiograph of the chest 7/11/2017 3:15 AM CDT     COMPARISON: 07/10/2017.     FINDINGS:   The lungs are clear. Cardiac silhouettes mildly enlarged. Left  subclavian catheter and endotracheal tube are present and satisfactorily  positioned.      The osseous structures and surrounding soft tissues demonstrate no acute  abnormality.       Impression:       1. No radiographic evidence of acute cardiopulmonary process.        This report was finalized on 07/11/2017 07:23 by Dr. Adolph Echols MD.    XR Chest 1 View [[id number]] Collected:  07/12/17 0705     Updated:  07/12/17 0710    Narrative:       EXAMINATION:   XR CHEST 1 VW-  7/12/2017 7:05 AM CDT     HISTORY: Respiratory failure     Single view the chest obtained. Left lung is clear. Mild right  infrahilar atelectasis is present. Left diaphragms indistinct consistent  with atelectasis left lower lobe. Infiltrate tube nasogastric tube are  present. Left subclavian catheter is present.     IMPRESSION  1. Atelectasis left lower lobe.        2. Mild atelectasis present in the right infrahilar region.  This report was finalized on 07/12/2017 07:07 by Dr. Adolph Echols MD.    XR Abdomen KUB [[id number]] Collected:  07/12/17 0713     Updated:  07/12/17 0716    Narrative:       EXAMINATION:   XR ABDOMEN KUB-  7/12/2017 7:13 AM CDT     HISTORY: NG tube     Single view the abdomen is obtained. Nasogastric tube is present  satisfactorily positioned in the fundus the stomach.       Impression:       Satisfactory placement of nasogastric tube  This report was finalized on 07/12/2017 07:13 by Dr. Adolph Echols MD.    XR Chest 1 View [[id number]]  Collected:  07/13/17 0705     Updated:  07/13/17 0709    Narrative:       EXAMINATION: XR CHEST 1 VW-. 7/13/2017 7:05 AM CDT     CHEST, ONE VIEW:     HISTORY: Respiratory failure     COMPARISON: 07/12/2017     A single frontal chest radiograph was obtained.     FINDINGS:     Tracheostomy tube and NG tube identified. Left-sided central venous  catheter observed.     Patchy lower lobe airspace opacities again noted with diminished lung  volumes with shallow inspiration likely stable.                                     Impression:       1. Stable one-day appearance the chest.     This report was finalized on 07/13/2017 07:06 by Dr. Pierre Pan MD.    US Renal Artery Complete [[id number]] Collected:  07/12/17 2008     Updated:  07/13/17 1645    Narrative:       EXAMINATION: US RENAL ARTERY COMPLETE- 7/12/2017 8:08 PM CDT     HISTORY: Uncontrolled hypertension.     REPORT: Sonographic images of the kidneys were obtained, examination  includes Doppler analysis of the renal arteries, using color, gray scale  and spectral Doppler techniques were duplex study. There are no  comparison studies.     The right kidney measures 10.3 x 5.5 x 4.9 cm and has normal morphology  and echogenicity. There is no hydronephrosis. The tail flow velocities  are provided on the ultrasound worksheet, however the origin the right  renal artery is obscured. The mid right renal artery, the peak systolic  velocity measures 116.8 cm/s, this corresponds with the velocity ratio  compared to the abdominal aorta of 1.72. The resistive index measures  0.55. Velocities in the distal right renal arteries are normal.     Left kidney measures 11.5 x 6.7 x 5.3 cm and has normal morphology and  echogenicity, there is no hydronephrosis on the left. Doppler images are  limited for the left kidney, both the origin and left mid renal artery  are obscured. The PSV at the distal left renal artery measures 99.2 cm/s  with a velocity ratio compared to the abdominal  aorta of 1.46 and  resistive index of 0.60. The bladder appears unremarkable.       Impression:       1. Normal morphological appearance of both kidneys.  2. Limited study with obscuration of the renal artery origins as well as  the mid left renal artery, no Doppler evidence for significant renal  artery stenosis is seen however. If more detailed evaluation is  indicated clinically, CTA would be the best study.  This report was finalized on 07/12/2017 20:13 by Dr. Marvin Mar MD.    US Renal Bilateral [[id number]] Collected:  07/12/17 2008     Updated:  07/13/17 1645    Narrative:       EXAMINATION: US RENAL ARTERY COMPLETE- 7/12/2017 8:08 PM CDT     HISTORY: Uncontrolled hypertension.     REPORT: Sonographic images of the kidneys were obtained, examination  includes Doppler analysis of the renal arteries, using color, gray scale  and spectral Doppler techniques were duplex study. There are no  comparison studies.     The right kidney measures 10.3 x 5.5 x 4.9 cm and has normal morphology  and echogenicity. There is no hydronephrosis. The tail flow velocities  are provided on the ultrasound worksheet, however the origin the right  renal artery is obscured. The mid right renal artery, the peak systolic  velocity measures 116.8 cm/s, this corresponds with the velocity ratio  compared to the abdominal aorta of 1.72. The resistive index measures  0.55. Velocities in the distal right renal arteries are normal.     Left kidney measures 11.5 x 6.7 x 5.3 cm and has normal morphology and  echogenicity, there is no hydronephrosis on the left. Doppler images are  limited for the left kidney, both the origin and left mid renal artery  are obscured. The PSV at the distal left renal artery measures 99.2 cm/s  with a velocity ratio compared to the abdominal aorta of 1.46 and  resistive index of 0.60. The bladder appears unremarkable.       Impression:       1. Normal morphological appearance of both kidneys.  2. Limited study  with obscuration of the renal artery origins as well as  the mid left renal artery, no Doppler evidence for significant renal  artery stenosis is seen however. If more detailed evaluation is  indicated clinically, CTA would be the best study.  This report was finalized on 07/12/2017 20:13 by Dr. Marvin Mar MD.    XR Chest 1 View [[id number]] Collected:  07/14/17 0721     Updated:  07/14/17 0725    Narrative:       Frontal supine radiograph of the chest 7/14/2017 2:10 CST     History: respiratory failure, patient intubated.; R13.12-Dysphagia,  oropharyngeal phase; R56.9-Unspecified convulsions;  T78.3XXS-Angioneurotic edema, sequela; F10.231-Alcohol dependence with  withdrawal delirium; Z74.09-Other reduced mobility     Comparison: Chest x-ray dated 07/13/2017      Findings:   Right PICC in place terminating near the atrial caval junction. Left  subclavian catheter has been removed. Remaining lines and tubes are  stable in position. No new opacities or pneumothoraces are visualized in  the chest. The cardiomediastinal silhouette and pulmonary vascularity  are unchanged.       No acute osseous or soft tissue abnormality is noted.        Impression:       Impression:    No significant interval change since previous exam.        This report was finalized on 07/14/2017 07:22 by Dr. Martita Finnegan MD.    XR Abdomen KUB [[id number]] Collected:  07/14/17 1254     Updated:  07/14/17 1257    Narrative:       EXAMINATION:   XR ABDOMEN KUB-  7/14/2017 12:54 PM CDT     HISTORY: NG tube placement     Single view the abdomen is obtained. Nasogastric tube is present  satisfactorily position in the fundus the stomach.       Impression:       Satisfactory placement nasogastric tube  This report was finalized on 07/14/2017 12:54 by Dr. Adolph Echols MD.    XR Chest 1 View [[id number]] Collected:  07/15/17 1012     Updated:  07/15/17 1017    Narrative:       HISTORY: Respiratory failure, intubated     CXR: A frontal view the chest  is obtained.     Comparison: 07/14/2017     Findings: Tracheostomy tube is appropriate in position. The prior  enteric tube has been removed. A new right upper extremity PICC line is  in place with the tip near the atrial caval junction.     There is a diminished level of inspiration. There is mild elevation of  the right hemidiaphragm. There are left lower lobe opacities. There is a  questionable small left pleural effusion. There is no pneumothorax. The  cardiomediastinal contours are similar       Impression:       1. Removal of the enteric tube with placement of a right upper extremity  PICC line. Tracheostomy tube appropriate in position.  2. Left lower lobe opacities may be patchy atelectasis. Consolidation  considered.      This report was finalized on 07/15/2017 10:14 by Dr. Sri Rivera MD.    US Venous Doppler Lower Extremity Bilateral (duplex) [[id number]] Collected:  07/16/17 1022     Updated:  07/16/17 1025    Narrative:       DUPLEX ULTRASOUND OF THE LOWER EXTREMITIES 7/16/2017 8:44 CST     HISTORY: BLE edema & pain; R13.12-Dysphagia, oropharyngeal phase;  R56.9-Unspecified convulsions; T78.3XXS-Angioneurotic edema, sequela;  F10.231-Alcohol dependence with withdrawal delirium; Z74.09-Other  reduced mobility     COMPARISON: NONE     FINDINGS:  Transverse and longitudinal grayscale and Doppler sonographic images of  bilateral lower extremities were obtained.     There is normal flow and compressibility of bilateral common femoral,  superficial femoral, popliteal, peroneal, anterior tibial, and posterior  tibial veins.       Impression:       1. Normal duplex ultrasound of bilateral lower extremities without  evidence of DVT.     This report was finalized on 07/16/2017 10:22 by Dr. Sri Rivera MD.    FL Video Swallow With Speech [[id number]] Collected:  07/17/17 1339     Updated:  07/17/17 1343    Narrative:       EXAMINATION: FL VIDEO SWALLOW W SPEECH-  7/17/2017 2:39 PM EDT     HISTORY:  Dysphagia     COMPARISON:None     TECHNIQUE:     Image number: 1     Fluoroscopy time: 1.2 minutes     FINDINGS:     The oral and pharyngeal phase of swallowing was evaluated with multiple  barium consistencies.     Patient tolerated all consistencies well without laryngeal penetration  or aspiration.       Impression:       1. Negative dysphagia study.  This report was finalized on 07/17/2017 13:40 by Dr. Pierre Pan MD.        Lab Results (all)     Procedure Component Value Units Date/Time    CBC & Differential [[id number]] Collected:  07/03/17 0807    Specimen:  Blood Updated:  07/03/17 0832    Narrative:       The following orders were created for panel order CBC & Differential.  Procedure                               Abnormality         Status                     ---------                               -----------         ------                     CBC Auto Differential[[id number]]        Abnormal            Final result                 Please view results for these tests on the individual orders.    CBC Auto Differential [[id number]]  (Abnormal) Collected:  07/03/17 0807    Specimen:  Blood Updated:  07/03/17 0832     WBC 11.95 (H) 10*3/mm3      RBC 3.93 (L) 10*6/mm3      Hemoglobin 11.9 (L) g/dL      Hematocrit 34.1 (L) %      MCV 86.8 fL      MCH 30.3 pg      MCHC 34.9 g/dL      RDW 14.0 %      RDW-SD 44.0 fl      MPV 9.6 fL      Platelets 245 10*3/mm3      Neutrophil % 82.0 (H) %      Lymphocyte % 8.3 (L) %      Monocyte % 8.9 %      Eosinophil % 0.3 %      Basophil % 0.2 %      Immature Grans % 0.3 %      Neutrophils, Absolute 9.81 (H) 10*3/mm3      Lymphocytes, Absolute 0.99 10*3/mm3      Monocytes, Absolute 1.06 10*3/mm3      Eosinophils, Absolute 0.03 10*3/mm3      Basophils, Absolute 0.02 10*3/mm3      Immature Grans, Absolute 0.04 (H) 10*3/mm3     Comprehensive Metabolic Panel [[id number]]  (Abnormal) Collected:  07/03/17 0807    Specimen:  Blood Updated:  07/03/17 0841     Glucose 127 (H) mg/dL      BUN  9 mg/dL      Creatinine 1.19 mg/dL      Sodium 141 mmol/L      Potassium 3.0 (L) mmol/L      Chloride 102 mmol/L      CO2 29.0 mmol/L      Calcium 8.8 mg/dL      Total Protein 6.9 g/dL      Albumin 4.00 g/dL      ALT (SGPT) 39 U/L      AST (SGOT) 72 (H) U/L      Alkaline Phosphatase 69 U/L      Total Bilirubin 0.6 mg/dL      eGFR Non African Amer 64 mL/min/1.73      Globulin 2.9 gm/dL      A/G Ratio 1.4 g/dL      BUN/Creatinine Ratio 7.6     Anion Gap 10.0 mmol/L     Urinalysis With / Culture If Indicated [[id number]]  (Abnormal) Collected:  07/03/17 0834    Specimen:  Urine from Urine, Clean Catch Updated:  07/03/17 0858     Color, UA Yellow     Appearance, UA Clear     pH, UA 7.5     Specific Gravity, UA 1.014     Glucose, UA Negative     Ketones, UA Negative     Bilirubin, UA Negative     Blood, UA Small (1+) (A)     Protein, UA 30 mg/dL (1+) (A)     Leuk Esterase, UA Negative     Nitrite, UA Negative     Urobilinogen, UA 0.2 E.U./dL    Urinalysis, Microscopic Only [[id number]]  (Abnormal) Collected:  07/03/17 0834    Specimen:  Urine from Urine, Clean Catch Updated:  07/03/17 0858     RBC, UA 3-5 (A) /HPF      WBC, UA 0-2 (A) /HPF      Bacteria, UA None Seen /HPF      Squamous Epithelial Cells, UA 0-2 /HPF      Hyaline Casts, UA None Seen /LPF      Methodology Automated Microscopy    Urine Drug Screen [[id number]]  (Normal) Collected:  07/03/17 0834    Specimen:  Urine from Urine, Clean Catch Updated:  07/03/17 0930     Amphetamine Screen, Urine Negative     Barbiturates Screen, Urine Negative     Benzodiazepine Screen, Urine Negative     Cocaine Screen, Urine Negative     Methadone Screen, Urine Negative     Opiate Screen Negative     Phencyclidine (PCP), Urine Negative     THC, Screen, Urine Negative    Narrative:       Negative Thresholds For Drugs Screened in Urine:    Amphetamines          500 ng/ml  Barbiturates          200 ng/ml  Benzodiazepines       200 ng/ml  Cocaine               150 ng/ml  Methadone              150 ng/ml  Opiates               300 ng/ml  Phencyclidine         25 ng/ml  THC                      50 ng/ml    The normal value for all drugs tested is negative. This report includes final unconfirmed screening results.  A positive result by this assay can be, at your request, sent to the [facility] for confirmation by gas chromatography. Unconfirmed results must not be used for non-medical purposes, such as employment or legal testing. Clinical consideration should be applied to any drug of abuse test result, particularly when unconfirmed results are used.    Basic Metabolic Panel [[id number]]  (Abnormal) Collected:  07/04/17 0937    Specimen:  Blood Updated:  07/04/17 0957     Glucose 126 (H) mg/dL      BUN 12 mg/dL      Creatinine 1.13 mg/dL      Sodium 142 mmol/L      Potassium 3.8 mmol/L      Chloride 105 mmol/L      CO2 26.0 mmol/L      Calcium 9.1 mg/dL      eGFR Non African Amer 68 mL/min/1.73      BUN/Creatinine Ratio 10.6     Anion Gap 11.0 mmol/L     Narrative:       GFR Normal >60  Chronic Kidney Disease <60  Kidney Failure <15    Magnesium [[id number]]  (Normal) Collected:  07/04/17 0937    Specimen:  Blood Updated:  07/04/17 0957     Magnesium 2.0 mg/dL     Blood Gas, Arterial [[id number]]  (Abnormal) Collected:  07/04/17 1635    Specimen:  Arterial Blood Updated:  07/04/17 1640     Site Arterial: left brachial     Gera's Test --      Documented in Rapid Comm        pH, Arterial 7.408 pH units      pCO2, Arterial 44.1 mm Hg      pO2, Arterial 235.2 (H) mm Hg      HCO3, Arterial 27.2 (H) mmol/L      Base Excess, Arterial 2.1 (H) mmol/L      O2 Saturation, Arterial 99.5 %      O2 Saturation Calculated 99.5 %      Barometric Pressure for Blood Gas -- mmHg       Component not reported at this site.        Modality Ventilator     FIO2 60 %      Ventilator Mode AC     Rate 10.0 Breaths/minute      PEEP 5.0     Vent CPAP/PEEP 5.0    Narrative:       Serial Number: [id number]    : [id number]    CBC  (No Diff) [[id number]]  (Abnormal) Collected:  07/05/17 0158    Specimen:  Blood Updated:  07/05/17 0213     WBC 15.95 (H) 10*3/mm3      RBC 4.19 (L) 10*6/mm3      Hemoglobin 12.8 (L) g/dL      Hematocrit 38.1 (L) %      MCV 90.9 fL      MCH 30.5 pg      MCHC 33.6 g/dL      RDW 14.7 %      RDW-SD 49.1 fl      MPV 9.6 fL      Platelets 246 10*3/mm3     Blood Gas, Arterial [[id number]]  (Abnormal) Collected:  07/05/17 0224    Specimen:  Arterial Blood Updated:  07/05/17 0227     Site Arterial: right radial     Gera's Test --      Documented in Rapid [facility]        pH, Arterial 7.408 pH units      pCO2, Arterial 40.7 mm Hg      pO2, Arterial 101.5 (H) mm Hg      HCO3, Arterial 25.1 mmol/L      Base Excess, Arterial 0.4 mmol/L      O2 Saturation, Arterial 97.7 %      O2 Saturation Calculated 97.7 %      Barometric Pressure for Blood Gas -- mmHg       Component not reported at this site.        Modality Ventilator     FIO2 50 %      Ventilator Mode Assist     Rate 10.0 Breaths/minute      PEEP 5.0     Vent CPAP/PEEP 5.0    Narrative:       Serial Number: [id number]    : [id number]    Basic Metabolic Panel [[id number]]  (Normal) Collected:  07/05/17 0158    Specimen:  Blood Updated:  07/05/17 0229     Glucose 96 mg/dL      BUN 16 mg/dL      Creatinine 1.10 mg/dL      Sodium 143 mmol/L      Potassium 3.7 mmol/L      Chloride 105 mmol/L      CO2 28.0 mmol/L      Calcium 9.1 mg/dL      eGFR Non African Amer 71 mL/min/1.73      BUN/Creatinine Ratio 14.5     Anion Gap 10.0 mmol/L     Narrative:       GFR Normal >60  Chronic Kidney Disease <60  Kidney Failure <15    Blood Gas, Arterial [[id number]]  (Abnormal) Collected:  07/05/17 0322    Specimen:  Arterial Blood Updated:  07/05/17 0326     Site Arterial: right radial     Gera's Test --      Documented in Rapid Comm        pH, Arterial 7.414 pH units      pCO2, Arterial 43.0 mm Hg      pO2, Arterial 86.5 mm Hg      HCO3, Arterial 26.9 (H) mmol/L      Base Excess, Arterial 2.0 mmol/L       O2 Saturation, Arterial 96.6 %      O2 Saturation Calculated 96.6 %      Barometric Pressure for Blood Gas -- mmHg       Component not reported at this site.        Modality Ventilator     FIO2 30 %      Ventilator Mode Assist     Rate 10.0 Breaths/minute      PEEP 5.0     Vent CPAP/PEEP 5.0    Narrative:       Serial Number: [id number]    : [id number]    CBC (No Diff) [[id number]]  (Abnormal) Collected:  07/06/17 0222    Specimen:  Blood Updated:  07/06/17 0256     WBC 9.27 10*3/mm3      RBC 3.94 (L) 10*6/mm3      Hemoglobin 12.1 (L) g/dL      Hematocrit 35.4 (L) %      MCV 89.8 fL      MCH 30.7 pg      MCHC 34.2 g/dL      RDW 14.7 %      RDW-SD 48.2 fl      MPV 9.7 fL      Platelets 216 10*3/mm3     Basic Metabolic Panel [[id number]]  (Abnormal) Collected:  07/06/17 0222    Specimen:  Blood Updated:  07/06/17 0257     Glucose 87 mg/dL      BUN 15 mg/dL      Creatinine 0.98 mg/dL      Sodium 142 mmol/L      Potassium 3.0 (L) mmol/L      Chloride 108 mmol/L      CO2 27.0 mmol/L      Calcium 8.4 mg/dL      eGFR Non African Amer 81 mL/min/1.73      BUN/Creatinine Ratio 15.3     Anion Gap 7.0 mmol/L     Narrative:       GFR Normal >60  Chronic Kidney Disease <60  Kidney Failure <15    Magnesium [[id number]]  (Normal) Collected:  07/06/17 0222    Specimen:  Blood Updated:  07/06/17 0257     Magnesium 2.1 mg/dL     Blood Gas, Arterial [[id number]] Collected:  07/06/17 0356    Specimen:  Arterial Blood Updated:  07/06/17 0401     Site Arterial: right radial     Gera's Test --      Documented in Rapid [facility]        pH, Arterial 7.430 pH units      pCO2, Arterial 35.9 mm Hg      pO2, Arterial 95.6 mm Hg      HCO3, Arterial 23.3 mmol/L      Base Excess, Arterial -0.5 mmol/L      O2 Saturation, Arterial 97.5 %      O2 Saturation Calculated 97.5 %      Barometric Pressure for Blood Gas -- mmHg       Component not reported at this site.        Modality Ventilator     FIO2 30 %      Ventilator Mode AC     Rate 10.0 Breaths/minute       PEEP 5.0     Vent CPAP/PEEP 5.0    Narrative:       Serial Number: [id number]    : [id number]    POC Glucose Fingerstick [[id number]]  (Normal) Collected:  07/06/17 0738    Specimen:  Blood Updated:  07/06/17 0750     Glucose 104 mg/dL       : [id number] Azalia Mcfarlane ID: [id number]       Gastrointestinal Panel, PCR [[id number]]  (Abnormal) Collected:  07/06/17 2327    Specimen:  Stool from Per Rectum Updated:  07/07/17 0124     Campylobacter Not Detected     Clostridium difficile (toxin A/B) Detected (A)        Due to the high asymptomatic carriage rates, especially in young children, the clinical relevance of the detection of toxigenic C. difficile from stool should be considered in the context of other clinical findings, patient age, and risk factors including hospitalization and antibiotic exposure.        Plesiomonas shigelloides Not Detected     Salmonella Not Detected     Vibrio Not Detected     Vibrio cholerae Not Detected     Yersinia enterocolitica Not Detected     Enteroaggregative E. coli (EAEC) Not Detected     Enteropathogenic E. coli (EPEC) Not Detected     Enterotoxigenic E. coli (ETEC) lt/st Not Detected     Shiga-like toxin-producing E. coli (STEC) stx1/stx2 Not Detected     E. coli O157 Not Detected     Shigella/Enteroinvasive E. coli (EIEC) Not Detected     Cryptosporidium Not Detected     Cyclospora cayetanensis Not Detected     Entamoeba histolytica Not Detected     Giardia lamblia Not Detected     Adenovirus F40/41 Not Detected     Astrovirus Not Detected     Norovirus GI/GII Not Detected     Rotavirus A Not Detected     Sapovirus (I, II, IV or V) Not Detected    Basic Metabolic Panel [[id number]]  (Abnormal) Collected:  07/07/17 0204    Specimen:  Blood Updated:  07/07/17 0317     Glucose 119 (H) mg/dL      BUN 14 mg/dL      Creatinine 1.05 mg/dL      Sodium 142 mmol/L      Potassium 3.2 (L) mmol/L      Chloride 111 (H) mmol/L      CO2 23.0 (L) mmol/L      Calcium 8.0 (L) mg/dL       eGFR Non African Amer 74 mL/min/1.73      BUN/Creatinine Ratio 13.3     Anion Gap 8.0 mmol/L     Narrative:       GFR Normal >60  Chronic Kidney Disease <60  Kidney Failure <15    Blood Gas, Arterial [[id number]]  (Abnormal) Collected:  07/07/17 0339    Specimen:  Arterial Blood Updated:  07/07/17 0343     Site Arterial: right radial     Gera's Test --      Documented in Rapid Comm        pH, Arterial 7.434 pH units      pCO2, Arterial 31.5 (L) mm Hg      pO2, Arterial 76.4 (L) mm Hg      HCO3, Arterial 20.6 (L) mmol/L      Base Excess, Arterial -2.5 (L) mmol/L      O2 Saturation, Arterial 95.8 %      O2 Saturation Calculated 95.8 %      Barometric Pressure for Blood Gas -- mmHg       Component not reported at this site.        Modality Ventilator     FIO2 30 %      Ventilator Mode AC     Rate 10.0 Breaths/minute      PEEP 5.0     Vent CPAP/PEEP 5.0    Narrative:       Serial Number: [id number]    : [id number]    Clostridium Difficile Toxin, PCR [[id number]]  (Abnormal) Collected:  07/06/17 1844    Specimen:  Stool from Per Rectum Updated:  07/07/17 1348     C. Difficile Toxins by PCR Positive (A)    Blood Gas, Arterial [[id number]]  (Abnormal) Collected:  07/08/17 0214    Specimen:  Arterial Blood Updated:  07/08/17 0217     Site Arterial: right radial     Gera's Test --      Documented in Rapid [facility]        pH, Arterial 7.422 pH units      pCO2, Arterial 35.8 mm Hg      pO2, Arterial 71.1 (L) mm Hg      HCO3, Arterial 22.8 mmol/L      Base Excess, Arterial -1.1 mmol/L      O2 Saturation, Arterial 94.7 %      O2 Saturation Calculated 94.7 %      Barometric Pressure for Blood Gas -- mmHg       Component not reported at this site.        Modality Ventilator     FIO2 30 %      Ventilator Mode Assist     Rate 10.0 Breaths/minute      PEEP 5.0     Vent CPAP/PEEP 5.0    Narrative:       Serial Number: [id number]    : [id number]    Basic Metabolic Panel [[id number]]  (Abnormal) Collected:  07/08/17 0411    Specimen:  Blood  Updated:  07/08/17 0451     Glucose 131 (H) mg/dL      BUN 11 mg/dL      Creatinine 0.98 mg/dL      Sodium 143 mmol/L      Potassium 3.5 mmol/L      Chloride 111 (H) mmol/L      CO2 21.0 (L) mmol/L      Calcium 8.0 (L) mg/dL      eGFR Non African Amer 81 mL/min/1.73      BUN/Creatinine Ratio 11.2     Anion Gap 11.0 mmol/L     Narrative:       GFR Normal >60  Chronic Kidney Disease <60  Kidney Failure <15    CBC & Differential [[id number]] Collected:  07/08/17 1057    Specimen:  Blood Updated:  07/08/17 1118    Narrative:       The following orders were created for panel order CBC & Differential.  Procedure                               Abnormality         Status                     ---------                               -----------         ------                     CBC Auto Differential[[id number]]        Abnormal            Final result                 Please view results for these tests on the individual orders.    CBC Auto Differential [[id number]]  (Abnormal) Collected:  07/08/17 1057    Specimen:  Blood from Arm, Right Updated:  07/08/17 1118     WBC 12.30 (H) 10*3/mm3      RBC 3.49 (L) 10*6/mm3      Hemoglobin 10.4 (L) g/dL      Hematocrit 31.5 (L) %      MCV 90.3 fL      MCH 29.8 pg      MCHC 33.0 g/dL      RDW 14.6 %      RDW-SD 48.3 fl      MPV 9.8 fL      Platelets 199 10*3/mm3      Neutrophil % 71.1 %      Lymphocyte % 11.7 (L) %      Monocyte % 13.4 (H) %      Eosinophil % 2.6 %      Basophil % 0.2 %      Immature Grans % 1.0 %      Neutrophils, Absolute 8.75 (H) 10*3/mm3      Lymphocytes, Absolute 1.44 10*3/mm3      Monocytes, Absolute 1.65 (H) 10*3/mm3      Eosinophils, Absolute 0.32 10*3/mm3      Basophils, Absolute 0.02 10*3/mm3      Immature Grans, Absolute 0.12 (H) 10*3/mm3     Blood Gas, Arterial [[id number]] Collected:  07/09/17 0159    Specimen:  Arterial Blood Updated:  07/09/17 0203     Site Arterial: right radial     Gera's Test --      Documented in Rapid Comm        pH, Arterial 7.417 pH  units      pCO2, Arterial 36.3 mm Hg      pO2, Arterial 80.8 mm Hg      HCO3, Arterial 22.9 mmol/L      Base Excess, Arterial -1.1 mmol/L      O2 Saturation, Arterial 96.2 %      O2 Saturation Calculated 96.2 %      Barometric Pressure for Blood Gas -- mmHg       Component not reported at this site.        Modality Ventilator     FIO2 30 %      Ventilator Mode Assist     Rate 10.0 Breaths/minute      PEEP 5.0     Vent CPAP/PEEP 5.0    Narrative:       Serial Number: [id number]    : [id number]    Basic Metabolic Panel [[id number]]  (Abnormal) Collected:  07/09/17 0348    Specimen:  Blood Updated:  07/09/17 0406     Glucose 108 (H) mg/dL      BUN 11 mg/dL      Creatinine 0.91 mg/dL      Sodium 143 mmol/L      Potassium 3.7 mmol/L      Chloride 111 (H) mmol/L      CO2 22.0 (L) mmol/L      Calcium 8.0 (L) mg/dL      eGFR Non African Amer 88 mL/min/1.73      BUN/Creatinine Ratio 12.1     Anion Gap 10.0 mmol/L     Narrative:       GFR Normal >60  Chronic Kidney Disease <60  Kidney Failure <15    Urinalysis With / Culture If Indicated [[id number]]  (Abnormal) Collected:  07/09/17 1100    Specimen:  Urine from Urine, Clean Catch Updated:  07/09/17 1109     Color, UA Dark Yellow (A)     Appearance, UA Clear     pH, UA 5.5     Specific Gravity, UA 1.022     Glucose, UA Negative     Ketones, UA Negative     Bilirubin, UA Negative     Blood, UA Negative     Protein, UA Negative     Leuk Esterase, UA Negative     Nitrite, UA Negative     Urobilinogen, UA 1.0 E.U./dL    Narrative:       Urine microscopic not indicated.    Blood Gas, Arterial [[id number]]  (Abnormal) Collected:  07/10/17 0255    Specimen:  Arterial Blood Updated:  07/10/17 0259     Site Arterial: right radial     Gera's Test --      Documented in Rapid Comm        pH, Arterial 7.443 pH units      pCO2, Arterial 32.9 (L) mm Hg      pO2, Arterial 55.9 (L) mm Hg      HCO3, Arterial 22.0 mmol/L      Base Excess, Arterial -1.2 mmol/L      O2 Saturation, Arterial 90.6 (L)  %      O2 Saturation Calculated 90.6 (L) %      Barometric Pressure for Blood Gas -- mmHg       Component not reported at this site.        Modality Ventilator     FIO2 30 %      Rate 10.0 Breaths/minute      PEEP 5.0     Vent CPAP/PEEP 5.0    Narrative:       Serial Number: [id number]    : [id number]    Comprehensive Metabolic Panel [[id number]]  (Abnormal) Collected:  07/10/17 0338    Specimen:  Blood Updated:  07/10/17 0404     Glucose 182 (H) mg/dL      BUN 13 mg/dL      Creatinine 0.98 mg/dL      Sodium 144 mmol/L      Potassium 4.1 mmol/L      Chloride 111 (H) mmol/L      CO2 24.0 mmol/L      Calcium 8.6 mg/dL      Total Protein 6.5 g/dL      Albumin 3.40 (L) g/dL      ALT (SGPT) 42 U/L      AST (SGOT) 51 (H) U/L      Alkaline Phosphatase 155 (H) U/L      Total Bilirubin 0.5 mg/dL      eGFR Non African Amer 81 mL/min/1.73      Globulin 3.1 gm/dL      A/G Ratio 1.1 g/dL      BUN/Creatinine Ratio 13.3     Anion Gap 9.0 mmol/L     Respiratory Culture [[id number]]  (Abnormal)  (Susceptibility) Collected:  07/08/17 0403    Specimen:  Sputum from NT Suction Updated:  07/10/17 0744     Respiratory Culture Heavy growth (4+) Staphylococcus aureus (A)     BETA LACTAMASE Positive     Gram Stain Result Greater than 25 WBCs per low power field      Many (4+) Mixed gram positive evelio    Narrative:       For Staphylococcus, penicillin-resistant, oxacillin-susceptible strains are resistant to B-lactamase labile penicillins but susceptible to other B-lactamase stable penicillins, B-lactamase inhibitor combinations, relavant cephems, and carbapenems.    Susceptibility      Staphylococcus aureus     [facility]     Clindamycin <=0.25 ug/ml Susceptible     Erythromycin <=0.25 ug/ml Susceptible     Gentamicin <=0.5 ug/ml Susceptible     Inducible Clindamycin Resistance NEG  Negative     Levofloxacin <=0.12 ug/ml Susceptible  [1]      Oxacillin 0.5 ug/ml Susceptible     Penicillin G >=0.5 ug/ml Resistant     Tetracycline <=1 ug/ml  Susceptible     Trimethoprim + Sulfamethoxazole <=10 ug/ml Susceptible     Vancomycin <=0.5 ug/ml Susceptible            [1]   Staphylococcus species may develop resistance during prolonged therapy with quinolones. Isolates that are initially susceptible may become resistant within three to four days after initiation of therapy. Testing of repeat isolates may be warranted.              Susceptibility Comments     Staphylococcus aureus      This isolate does not demonstrate inducible clindamycin resistance in vitro.               Vancomycin, Trough [[id number]]  (Normal) Collected:  07/10/17 0830    Specimen:  Blood Updated:  07/10/17 0906     Vancomycin Trough 12.99 mcg/mL     Basic Metabolic Panel [[id number]]  (Abnormal) Collected:  07/11/17 0142    Specimen:  Blood Updated:  07/11/17 0222     Glucose 143 (H) mg/dL      BUN 21 mg/dL      Creatinine 1.07 mg/dL      Sodium 147 (H) mmol/L      Potassium 3.7 mmol/L      Chloride 109 mmol/L      CO2 25.0 mmol/L      Calcium 8.9 mg/dL      eGFR Non African Amer 73 mL/min/1.73      BUN/Creatinine Ratio 19.6     Anion Gap 13.0 mmol/L     Narrative:       GFR Normal >60  Chronic Kidney Disease <60  Kidney Failure <15    Blood Gas, Arterial [[id number]]  (Abnormal) Collected:  07/11/17 0327    Specimen:  Arterial Blood Updated:  07/11/17 0330     Site Arterial: right radial     Gera's Test --      Documented in Rapid Comm        pH, Arterial 7.495 (H) pH units      pCO2, Arterial 31.1 (L) mm Hg      pO2, Arterial 76.1 (L) mm Hg      HCO3, Arterial 23.4 mmol/L      Base Excess, Arterial 1.1 mmol/L      O2 Saturation, Arterial 96.3 %      O2 Saturation Calculated 96.3 %      Barometric Pressure for Blood Gas -- mmHg       Component not reported at this site.        Modality Ventilator     FIO2 40 %      Ventilator Mode AC     Rate 10.0 Breaths/minute      PEEP 10.0     Vent CPAP/PEEP 10.0    Narrative:       Serial Number: [id number]    : [id number]    Blood Gas, Arterial  [[id number]]  (Abnormal) Collected:  07/12/17 0310    Specimen:  Arterial Blood Updated:  07/12/17 0314     Site Arterial: right radial     Gera's Test --      Documented in Rapid Comm        pH, Arterial 7.498 (H) pH units      pCO2, Arterial 36.6 mm Hg      pO2, Arterial 76.8 (L) mm Hg      HCO3, Arterial 27.8 (H) mmol/L      Base Excess, Arterial 4.6 (H) mmol/L      O2 Saturation, Arterial 96.4 %      O2 Saturation Calculated 96.4 %      Barometric Pressure for Blood Gas -- mmHg       Component not reported at this site.        Modality Ventilator     FIO2 40 %      Ventilator Mode AC     Rate 10.0 Breaths/minute      PEEP 10.0     Vent CPAP/PEEP 10.0    Narrative:       Serial Number: [id number]    : [id number]    Basic Metabolic Panel [[id number]]  (Abnormal) Collected:  07/12/17 0226    Specimen:  Blood Updated:  07/12/17 0334     Glucose 147 (H) mg/dL      BUN 29 (H) mg/dL      Creatinine 1.06 mg/dL      Sodium 147 (H) mmol/L      Potassium 3.3 (L) mmol/L      Chloride 106 mmol/L      CO2 28.0 mmol/L      Calcium 9.0 mg/dL      eGFR Non African Amer 74 mL/min/1.73      BUN/Creatinine Ratio 27.4 (H)     Anion Gap 13.0 mmol/L     Narrative:       GFR Normal >60  Chronic Kidney Disease <60  Kidney Failure <15    CBC & Differential [[id number]] Collected:  07/12/17 0226    Specimen:  Blood Updated:  07/12/17 0416    Narrative:       The following orders were created for panel order CBC & Differential.  Procedure                               Abnormality         Status                     ---------                               -----------         ------                     Manual Differential[[id number]]          Abnormal            Final result               Scan Slide[[id number]]                                       Final result               CBC Auto Differential[[id number]]        Abnormal            Final result                 Please view results for these tests on the individual orders.    CBC Auto Differential  [[id number]]  (Abnormal) Collected:  07/12/17 0226    Specimen:  Blood Updated:  07/12/17 0416     WBC 16.64 (H) 10*3/mm3      RBC 3.49 (L) 10*6/mm3      Hemoglobin 10.4 (L) g/dL      Hematocrit 31.3 (L) %      MCV 89.7 fL      MCH 29.8 pg      MCHC 33.2 g/dL      RDW 14.7 %      RDW-SD 47.8 fl      MPV 9.6 fL      Platelets 349 10*3/mm3     Scan Slide [[id number]] Collected:  07/12/17 0226    Specimen:  Blood Updated:  07/12/17 0416     Scan Slide --      See Manual Differential Results       Manual Differential [[id number]]  (Abnormal) Collected:  07/12/17 0226    Specimen:  Blood Updated:  07/12/17 0416     Neutrophil % 61.0 %      Lymphocyte % 23.0 %      Monocyte % 3.0 (L) %      Bands %  7.0 %      Metamyelocyte % 2.0 (H) %      Myelocyte % 3.0 (H) %      Atypical Lymphocyte % 1.0 %      Neutrophils Absolute 11.32 (H) 10*3/mm3      Lymphocytes Absolute 3.83 10*3/mm3      Monocytes Absolute 0.50 10*3/mm3      Hypochromia Slight/1+     WBC Morphology Normal     Platelet Morphology Normal    Blood Gas, Arterial [[id number]]  (Abnormal) Collected:  07/13/17 0317    Specimen:  Arterial Blood Updated:  07/13/17 0320     Site Arterial: right radial     Gera's Test --      Documented in Rapid Comm        pH, Arterial 7.441 pH units      pCO2, Arterial 38.8 mm Hg      pO2, Arterial 105.3 (H) mm Hg      HCO3, Arterial 25.8 mmol/L      Base Excess, Arterial 1.8 mmol/L      O2 Saturation, Arterial 98.0 %      O2 Saturation Calculated 98.0 %      Barometric Pressure for Blood Gas -- mmHg       Component not reported at this site.        Modality Ventilator     FIO2 40 %      Ventilator Mode AC     Rate 10.0 Breaths/minute      PEEP 10.0     Vent CPAP/PEEP 10.0    Narrative:       Serial Number: [id number]    : [id number]    Blood Gas, Arterial [[id number]]  (Abnormal) Collected:  07/14/17 0225    Specimen:  Arterial Blood Updated:  07/14/17 0229     Site Arterial: right radial     Gera's Test --      Documented in Rapid Comm           pH, Arterial 7.444 pH units      pCO2, Arterial 33.7 (L) mm Hg      pO2, Arterial 75.2 (L) mm Hg      HCO3, Arterial 22.6 mmol/L      Base Excess, Arterial -0.7 mmol/L      O2 Saturation, Arterial 95.7 %      O2 Saturation Calculated 95.7 %      Barometric Pressure for Blood Gas -- mmHg       Component not reported at this site.        Modality Ventilator     FIO2 40 %      Ventilator Mode Assist     Rate 10.0 Breaths/minute      PEEP 8.0     Vent CPAP/PEEP 8.0    Narrative:       Serial Number: [id number]    : [id number]    CBC & Differential [[id number]] Collected:  07/14/17 0257    Specimen:  Blood Updated:  07/14/17 0317    Narrative:       The following orders were created for panel order CBC & Differential.  Procedure                               Abnormality         Status                     ---------                               -----------         ------                     CBC Auto Differential[[id number]]        Abnormal            Final result                 Please view results for these tests on the individual orders.    CBC Auto Differential [[id number]]  (Abnormal) Collected:  07/14/17 0257    Specimen:  Blood Updated:  07/14/17 0317     WBC 16.14 (H) 10*3/mm3      RBC 3.64 (L) 10*6/mm3      Hemoglobin 10.9 (L) g/dL      Hematocrit 32.8 (L) %      MCV 90.1 fL      MCH 29.9 pg      MCHC 33.2 g/dL      RDW 14.5 %      RDW-SD 47.7 fl      MPV 9.8 fL      Platelets 400 10*3/mm3      Neutrophil % 68.5 %      Lymphocyte % 18.7 %      Monocyte % 8.0 %      Eosinophil % 0.6 %      Basophil % 0.2 %      Immature Grans % 4.0 %      Neutrophils, Absolute 11.04 (H) 10*3/mm3      Lymphocytes, Absolute 3.02 10*3/mm3      Monocytes, Absolute 1.29 10*3/mm3      Eosinophils, Absolute 0.10 10*3/mm3      Basophils, Absolute 0.04 10*3/mm3      Immature Grans, Absolute 0.65 (H) 10*3/mm3     Basic Metabolic Panel [[id number]]  (Abnormal) Collected:  07/14/17 0257    Specimen:  Blood Updated:  07/14/17 0337     Glucose  105 (H) mg/dL      BUN 24 (H) mg/dL      Creatinine 0.99 mg/dL      Sodium 147 (H) mmol/L      Potassium 2.9 (C) mmol/L      Chloride 111 (H) mmol/L      CO2 24.0 mmol/L      Calcium 9.1 mg/dL      eGFR Non African Amer 80 mL/min/1.73      BUN/Creatinine Ratio 24.2     Anion Gap 12.0 mmol/L     Narrative:       GFR Normal >60  Chronic Kidney Disease <60  Kidney Failure <15    CBC (No Diff) [[id number]]  (Abnormal) Collected:  07/15/17 0143    Specimen:  Blood Updated:  07/15/17 0217     WBC 16.11 (H) 10*3/mm3      RBC 3.76 (L) 10*6/mm3      Hemoglobin 11.2 (L) g/dL      Hematocrit 33.8 (L) %      MCV 89.9 fL      MCH 29.8 pg      MCHC 33.1 g/dL      RDW 14.4 %      RDW-SD 47.0 fl      MPV 9.8 fL      Platelets 366 10*3/mm3     Blood Gas, Arterial [[id number]] Collected:  07/15/17 0217    Specimen:  Arterial Blood Updated:  07/15/17 0223     Site Arterial: right radial     Gera's Test --      Documented in Rapid Comm        pH, Arterial 7.435 pH units      pCO2, Arterial 35.4 mm Hg      pO2, Arterial 85.1 mm Hg      HCO3, Arterial 23.2 mmol/L      Base Excess, Arterial -0.4 mmol/L      O2 Saturation, Arterial 96.8 %      O2 Saturation Calculated 96.8 %      Barometric Pressure for Blood Gas -- mmHg       Component not reported at this site.        Modality Cool Aerosol     Flow Rate 35.00 lpm     Narrative:       Serial Number: [id number]    : [id number]    Basic Metabolic Panel [[id number]]  (Abnormal) Collected:  07/15/17 0143    Specimen:  Blood Updated:  07/15/17 0227     Glucose 93 mg/dL      BUN 18 mg/dL      Creatinine 0.90 mg/dL      Sodium 146 (H) mmol/L      Potassium 3.4 (L) mmol/L      Chloride 110 mmol/L      CO2 25.0 mmol/L      Calcium 8.8 mg/dL      eGFR Non African Amer 89 mL/min/1.73      BUN/Creatinine Ratio 20.0     Anion Gap 11.0 mmol/L     Narrative:       GFR Normal >60  Chronic Kidney Disease <60  Kidney Failure <15    Magnesium [[id number]]  (Normal) Collected:  07/15/17 0143    Specimen:  Blood  Updated:  07/15/17 0227     Magnesium 2.1 mg/dL     CBC (No Diff) [[id number]]  (Abnormal) Collected:  07/16/17 0230    Specimen:  Blood Updated:  07/16/17 0253     WBC 19.16 (H) 10*3/mm3      RBC 3.84 (L) 10*6/mm3      Hemoglobin 11.3 (L) g/dL      Hematocrit 34.0 (L) %      MCV 88.5 fL      MCH 29.4 pg      MCHC 33.2 g/dL      RDW 14.4 %      RDW-SD 46.8 fl      MPV 9.8 fL      Platelets 357 10*3/mm3     Basic Metabolic Panel [[id number]]  (Abnormal) Collected:  07/16/17 0230    Specimen:  Blood Updated:  07/16/17 0305     Glucose 131 (H) mg/dL      BUN 18 mg/dL      Creatinine 0.95 mg/dL      Sodium 143 mmol/L      Potassium 3.6 mmol/L      Chloride 110 mmol/L      CO2 23.0 (L) mmol/L      Calcium 8.8 mg/dL      eGFR Non African Amer 84 mL/min/1.73      BUN/Creatinine Ratio 18.9     Anion Gap 10.0 mmol/L     Narrative:       GFR Normal >60  Chronic Kidney Disease <60  Kidney Failure <15    CBC (No Diff) [[id number]]  (Abnormal) Collected:  07/17/17 0343    Specimen:  Blood Updated:  07/17/17 0442     WBC 23.30 (H) 10*3/mm3      RBC 3.05 (L) 10*6/mm3      Hemoglobin 9.2 (L) g/dL      Hematocrit 27.4 (L) %      MCV 89.8 fL      MCH 30.2 pg      MCHC 33.6 g/dL      RDW 14.7 %      RDW-SD 48.4 fl      MPV 10.2 fL      Platelets 493 (H) 10*3/mm3     Basic Metabolic Panel [[id number]]  (Abnormal) Collected:  07/17/17 0343    Specimen:  Blood Updated:  07/17/17 0513     Glucose 86 mg/dL      BUN 17 mg/dL       Specimen hemolyzed. Results may be affected.        Creatinine 0.95 mg/dL      Sodium 142 mmol/L      Potassium 3.9 mmol/L       Specimen hemolyzed.  Results may be affected.        Chloride 109 mmol/L      CO2 22.0 (L) mmol/L      Calcium 9.3 mg/dL      eGFR Non African Amer 84 mL/min/1.73      BUN/Creatinine Ratio 17.9     Anion Gap 11.0 mmol/L     Narrative:       GFR Normal >60  Chronic Kidney Disease <60  Kidney Failure <15    CBC & Differential [[id number]] Collected:  07/18/17 0510    Specimen:  Blood Updated:   07/18/17 0551    Narrative:       The following orders were created for panel order CBC & Differential.  Procedure                               Abnormality         Status                     ---------                               -----------         ------                     CBC Auto Differential[[id number]]        Abnormal            Final result                 Please view results for these tests on the individual orders.    CBC Auto Differential [[id number]]  (Abnormal) Collected:  07/18/17 0510    Specimen:  Blood Updated:  07/18/17 0551     WBC 14.20 (H) 10*3/mm3      RBC 4.00 (L) 10*6/mm3      Hemoglobin 11.8 (L) g/dL      Hematocrit 36.0 (L) %      MCV 90.0 fL      MCH 29.5 pg      MCHC 32.8 (L) g/dL      RDW 14.4 %      RDW-SD 47.6 fl      MPV 10.4 fL      Platelets 413 (H) 10*3/mm3      Neutrophil % 73.2 %      Lymphocyte % 13.7 (L) %      Monocyte % 9.9 %      Eosinophil % 2.3 %      Basophil % 0.2 %      Immature Grans % 0.7 %      Neutrophils, Absolute 10.41 (H) 10*3/mm3      Lymphocytes, Absolute 1.94 10*3/mm3      Monocytes, Absolute 1.40 (H) 10*3/mm3      Eosinophils, Absolute 0.32 10*3/mm3      Basophils, Absolute 0.03 10*3/mm3      Immature Grans, Absolute 0.10 (H) 10*3/mm3     Basic Metabolic Panel [[id number]]  (Abnormal) Collected:  07/18/17 0510    Specimen:  Blood Updated:  07/18/17 0600     Glucose 85 mg/dL      BUN 22 (H) mg/dL      Creatinine 1.17 mg/dL      Sodium 144 mmol/L      Potassium 3.9 mmol/L      Chloride 108 mmol/L      CO2 22.0 (L) mmol/L      Calcium 9.7 mg/dL      eGFR Non African Amer 66 mL/min/1.73      BUN/Creatinine Ratio 18.8     Anion Gap 14.0 (H) mmol/L     Narrative:       GFR Normal >60  Chronic Kidney Disease <60  Kidney Failure <15    Uric Acid [[id number]]  (Normal) Collected:  07/18/17 0510    Specimen:  Blood Updated:  07/18/17 0751     Uric Acid 7.1 mg/dL     CBC & Differential [[id number]] Collected:  07/19/17 0550    Specimen:  Blood Updated:  07/19/17 0557     Narrative:       The following orders were created for panel order CBC & Differential.  Procedure                               Abnormality         Status                     ---------                               -----------         ------                     CBC Auto Differential[[id number]]        Abnormal            Final result                 Please view results for these tests on the individual orders.    CBC Auto Differential [[id number]]  (Abnormal) Collected:  07/19/17 0550    Specimen:  Blood Updated:  07/19/17 0557     WBC 8.50 10*3/mm3      RBC 3.55 (L) 10*6/mm3      Hemoglobin 10.3 (L) g/dL      Hematocrit 32.0 (L) %      MCV 90.1 fL      MCH 29.0 pg      MCHC 32.2 (L) g/dL      RDW 14.5 %      RDW-SD 47.8 fl      MPV 9.9 fL      Platelets 357 10*3/mm3      Neutrophil % 61.1 %      Lymphocyte % 19.8 %      Monocyte % 13.9 (H) %      Eosinophil % 4.0 %      Basophil % 0.4 %      Immature Grans % 0.8 %      Neutrophils, Absolute 5.20 10*3/mm3      Lymphocytes, Absolute 1.68 10*3/mm3      Monocytes, Absolute 1.18 10*3/mm3      Eosinophils, Absolute 0.34 10*3/mm3      Basophils, Absolute 0.03 10*3/mm3      Immature Grans, Absolute 0.07 (H) 10*3/mm3     Basic Metabolic Panel [[id number]]  (Abnormal) Collected:  07/19/17 0550    Specimen:  Blood Updated:  07/19/17 0618     Glucose 87 mg/dL      BUN 21 mg/dL      Creatinine 1.10 mg/dL      Sodium 144 mmol/L      Potassium 4.4 mmol/L      Chloride 112 (H) mmol/L      CO2 20.0 (L) mmol/L      Calcium 9.2 mg/dL      eGFR Non African Amer 71 mL/min/1.73      BUN/Creatinine Ratio 19.1     Anion Gap 12.0 mmol/L     Narrative:       GFR Normal >60  Chronic Kidney Disease <60  Kidney Failure <15        Hospital Course:  The patient is a 52 y.o. male who presented to [facility] with Complaints of a seizure and elevated blood pressure.  He also complained of his tongue have been swelling prior to the seizure activity and then additionally biting his tongue.   He was found to have posterior reversible encephalopathy syndrome on an MRI done in the emergency department.  He was admitted to the intensive care unit and neurology consulted as well.  We were initially very tactful about his blood pressure control.  We did not want him to strictly to because of his PRES and malignant hypertension.  Dr. Campos placed him on Keppra.  The patient on my first day with him, the day of admission, denied any significant alcohol intake.  He told me that he had a few beers on occasion.  However, by hospital day #2 the patient was tremulous and displaying signs of alcohol withdrawal.  His daughter did endorse that he indeed did drink significantly more alcohol than what he had admitted to.  He was placed on scheduled Valium and as needed Ativan.  Unfortunately, he became increasingly combative and agitated.  He developed delirium tremens.  Dr. Campos and I felt that we had no choice but to place him on ventilatory support to get him through his withdrawal period.     His intensive care course was complicated by possible ventilator associated pneumonia, Clostridium difficile colitis, and he eventually required tracheostomy placement to wean from ventilatory support.  He was fortunately able to do this and ultimately rebounded very nicely after coming off of ventilatory support late in the evening of July 14th. He is now on room air and doing very well with his tracheostomy. ENT hopefully can consider decannulation in the near future.  They will see him as an outpatient setting.  He has been capping his trach and also using a Passy-Bynum valve.    His blood pressure is much improved over last 48-72 hours. Still a few hypertensive numbers, but overall doing much better. Changed metoprolol to carvedilol on 7/14. Placed on losartan on 7/14 and will increase today. There was some question as to whether or not he had tongue swelling on lisinopril. However, angiotensin receptor blockers should be okay  "as he needs these for blood pressure control. Continue amlodipine, Change clonidine patch back to oral at this point.       He will remain on Keppra for at least 6-8 weeks per the neurology service.  I will decrease his dose to 500 mg twice per day for now and continue.  He will follow-up with neurology in 3 weeks and have an outpatient MRI.  Also instructed he and his family that he is not to drive for 90 days post his seizure      He had been on broad-spectrum antibiotics for potential pneumonia since the 11th. The Staph in his sputum was MSSA. Discontinued vancomycin on 7/14. Discontinued Zosyn 7/16. Levaquin discontinued on 7/18, received 7 days. He is also completing Flagyl Clostridium difficile toxin present in his stool. Today is day 13, will treat for 14. No recent loose stools. His white blood cell count has finally normalized today. No signs of ongoing infections and no fevers.      Placed on indomethacin and colchicine on 7/17 for gout.  He has done very well with this and at this point in time can start taking them on an as-needed basis.  He is going to discuss with primary care next week about when to reinitiate allopurinol.  His uric acid was 7.1.  He knows to not take allopurinol in the acute phase of acute gout flare.      Speech therapy cleared for regular foods with thin liquids after a dysphagia study on 7/17.  He did well with physical and occupational therapy.  At this point in time, he is ambulatory and able to conduct all of his own activities of daily living.  For safety purposes, he is going to be staying with his mother for at least the first future.    Condition on Discharge: Good.     Physical Exam on Discharge:  /86  Pulse 96  Temp 97.2 °F (36.2 °C)  Resp 20  Ht 67\" (170.2 cm)  Wt 215 lb 8 oz (97.8 kg)  SpO2 99%  BMI 33.75 kg/m2  Physical Exam  Constitutional: No distress.   Seen and discussed with his nurse, Elin. His sister and mother are present. He is calm, polite, " conversant. PICC has been removed.    Head: Normocephalic and atraumatic.   Eyes: Pupils are equal, round, and reactive to light.   Neck: Neck supple. No JVD present. Trach clean and with PMV.   Cardiovascular: Normal rate and regular rhythm.   Pulmonary/Chest: Effort normal and breath sounds normal. On room air via trach.    Abdominal: Soft. Bowel sounds are normal.   Musculoskeletal: He exhibits edema (trace).  Improved calor and erythema of the left great toe at the MTP joint.   Neurological: He is appropriate. Good strength. Ambulatory.   Skin: Skin is warm and dry.     Discharge Disposition:  Home to his mother's house.     Discharge Medications:   Nishant Savage   Home Medication Instructions EMMETT:[id number]    Printed on:07/19/17 0658   Medication Information                      amLODIPine (NORVASC) 10 MG tablet  Take 1 tablet by mouth Daily.             carvedilol (COREG) 25 MG tablet  Take 1 tablet by mouth Every 12 (Twelve) Hours.             cloNIDine (CATAPRES) 0.1 MG tablet  Take 0.1 mg by mouth every night.             colchicine 0.6 MG tablet  Take 1 tablet by mouth Every 12 (Twelve) Hours As Needed for Muscle / Joint Pain.             fenofibrate (TRICOR) 145 MG tablet  Take 1 tablet by mouth Every Night.             HYDROcodone-acetaminophen (NORCO) 5-325 MG per tablet  Take 1 tablet by mouth Every 4 (Four) Hours As Needed for Moderate Pain  for up to 3 days.             indomethacin (INDOCIN) 50 MG capsule  Take 1 capsule by mouth 2 (Two) Times a Day As Needed for Mild Pain . Do not take for greater then 3-5 days.             levETIRAcetam (KEPPRA) 500 MG tablet  Take 1 tablet by mouth Every 12 (Twelve) Hours.             losartan (COZAAR) 100 MG tablet  Take 1 tablet by mouth Daily.             metroNIDAZOLE (FLAGYL) 500 MG tablet  Take 1 tablet by mouth Every 8 (Eight) Hours. Indications: Infection Within the Abdomen             vardenafil (LEVITRA) 10 MG tablet  Take 1 tablet by mouth Daily  As Needed for erectile dysfunction.               Discharge Diet:   Diet Instructions     Diet: Regular; Thin Liquids, No Restrictions       Discharge Diet:  Regular   Fluid Consistency:  Thin Liquids, No Restrictions               Activity at Discharge:   Activity Instructions     Activity as Tolerated           Other Instructions (Specify)       No driving for 90 days.               Follow-up Appointments:   1.  EPI [facility]ote in 1 week.  2.  Dr. Pierson to be determined.   3.  EPI Pardo in 3 weeks.    Test Results Pending at Discharge: None.    Jairon Perez DO  07/19/17  10:19 AM    Time: 40 minutes.

## 2017-07-19 NOTE — PROGRESS NOTES
Continued Stay Note   [place]     Patient Name: Nishant Savage  MRN: [id number]  Today's Date: 7/19/2017    Admit Date: 7/3/2017          Discharge Plan       07/19/17 1302    Case Management/Social Work Plan    Plan HOME WITH MOTHER    Patient/Family In Agreement With Plan yes    Additional Comments [facility] AT [facility] ASKING IF SUPPLIES NEEDED FOR TRACHE      07/19/17 1052    Case Management/Social Work Plan    Plan Home with mother    Patient/Family In Agreement With Plan yes    Additional Comments Pt is being d/c'ed home today. He has been independently ambulating. He did well with his overnight pulse ox but they are not going to decannulate for a few weeks. Left a message for Dr. Dangelo Jerome's nurse [phone], to see if pt will need anything for the capped trach so it can be ordered.               Discharge Codes     None        Expected Discharge Date and Time     Expected Discharge Date Expected Discharge Time    Jul 19, 2017             Trinity Bryson RN

## 2017-07-19 NOTE — PLAN OF CARE
Problem: Patient Care Overview (Adult)  Goal: Plan of Care Review  Outcome: Ongoing (interventions implemented as appropriate)    07/19/17 0527   Coping/Psychosocial Response Interventions   Plan Of Care Reviewed With patient   Patient Care Overview   Progress improving   Outcome Evaluation   Outcome Summary/Follow up Plan Pt rested well this shift. C/o of pain x1, PRN med given. Pt remains on room air. Trach is capped off. VSS. Will continue to monitor.        Goal: Adult Individualization and Mutuality  Outcome: Ongoing (interventions implemented as appropriate)  Goal: Discharge Needs Assessment  Outcome: Ongoing (interventions implemented as appropriate)    Problem: Fall Risk (Adult)  Goal: Absence of Falls  Outcome: Ongoing (interventions implemented as appropriate)

## 2017-07-19 NOTE — PLAN OF CARE
Problem: Patient Care Overview (Adult)  Goal: Plan of Care Review  Outcome: Ongoing (interventions implemented as appropriate)    07/19/17 0953   Coping/Psychosocial Response Interventions   Plan Of Care Reviewed With patient;family   Outcome Evaluation   Outcome Summary/Follow up Plan Pt's trach now capped. Pt completed trials of thin liquids with no overt s/s of aspiration. SLP will sign off.         Problem: Inpatient SLP  Goal: Dysphagia- Patient will safely consume diet as per recommendation with no signs/symptoms of aspiration  Outcome: Outcome(s) achieved Date Met:  07/19/17    07/15/17 1157 07/19/17 0953   Safely Consume Diet   Safely Consume Diet- SLP, Date Established 07/15/17 --    Safely Consume Diet- SLP, Time to Achieve by discharge --    Safely Consume Diet- SLP, Additional Goal Patient will tolerate upgraded PO trials with SLP w/ no s/s of aspiration. --    Safely Consume Diet- SLP, Date Goal Reviewed --  07/19/17   Safely Consume Diet- SLP, Outcome --  goal met       Goal: SLP Additional Goal 1  Outcome: Outcome(s) achieved Date Met:  07/19/17 07/16/17 1139 07/19/17 0953   SLP- Additional Goal 1   Additional Goal 1- SLP, Date Established 07/16/17 --    Additional Goal 1- SLP, Time to Achieve by discharge --    Additional Goal 1- SLP, Activity Level Patient will tolerate PMV without any s/s of dsitress and produce speech over background noise at 3 feet away. --    Additional Goal 1- SLP, Additional Goal Patient will independently place and remove PMV. --    Additional Goal 1- SLP, Date Goal Reviewed --  07/19/17   Additional Goal 1- SLP, Outcome --  goal met

## 2017-07-19 NOTE — PROGRESS NOTES
Continued Stay Note   [facility]     Patient Name: Nishant Savage  MRN: [id number]  Today's Date: 7/19/2017    Admit Date: 7/3/2017          Discharge Plan       07/19/17 1052    Case Management/Social Work Plan    Plan Home with mother    Patient/Family In Agreement With Plan yes    Additional Comments Pt is being d/c'ed home today. He has been independently ambulating. He did well with his overnight pulse ox but they are not going to decannulate for a few weeks. Left a message for Dr. Dangelo Jerome's nurse [phone], to see if pt will need anything for the capped trach so it can be ordered.               Discharge Codes     None        Expected Discharge Date and Time     Expected Discharge Date Expected Discharge Time    Jul 19, 2017             FLAKITA Petty

## 2017-07-19 NOTE — PROGRESS NOTES
Continued Stay Note  [facility]     Patient Name: Nishant Savage  MRN: [id number]  Today's Date: 7/19/2017    Admit Date: 7/3/2017          Discharge Plan       07/19/17 1347    Case Management/Social Work Plan    Plan Home with Mother    Additional Comments Pt prefers [facility] for trach supplies that have been ordered. Spoke with Mylene there [phone] and faxed needed information/orders to her at [phone]. They will deliver to pt's home.  RN concerned with no HH care being ordered, left message for Dr Pierson's office.        07/19/17 1302    Case Management/Social Work Plan    Plan HOME WITH MOTHER    Patient/Family In Agreement With Plan yes    Additional Comments VM TO [facility] AT [facility] ASKING IF SUPPLIES NEEDED FOR TRACHE      07/19/17 1052    Case Management/Social Work Plan    Plan Home with mother    Patient/Family In Agreement With Plan yes    Additional Comments Pt is being d/c'ed home today. He has been independently ambulating. He did well with his overnight pulse ox but they are not going to decannulate for a few weeks. Left a message for Dr. Dangelo Jerome's nurse [phone], to see if pt will need anything for the capped trach so it can be ordered.               Discharge Codes     None        Expected Discharge Date and Time     Expected Discharge Date Expected Discharge Time    Jul 19, 2017             FLAKITA Mireles

## 2017-07-20 ENCOUNTER — TRANSITIONAL CARE MANAGEMENT TELEPHONE ENCOUNTER (OUTPATIENT)
Dept: FAMILY MEDICINE CLINIC | Facility: CLINIC | Age: 52
End: 2017-07-20

## 2017-07-20 NOTE — PAYOR COMM NOTE
"DC HOME 7-19-17  [id number]    Nishant Wahl (52 y.o. Male)     Date of Birth Social Security Number Address Home Phone MRN    1965  [id number]  [place] [address] [phone] [id number]    Tenriism Marital Status          [facility] Single       Admission Date Admission Type Admitting Provider Attending Provider Department, Room/Bed    7/3/17 Emergency Jairon Perez DO  [facility] 4C, 479/1    Discharge Date Discharge Disposition Discharge Destination        7/19/2017 Home or Self Care Home            Attending Provider: (none)    Allergies:  Lipitor [Atorvastatin]    Isolation:  Spore   Infection:  C.difficile (07/07/17)   Code Status:  Prior    Ht:  67\" (170.2 cm)   Wt:  215 lb 8 oz (97.8 kg)    Admission Cmt:  None   Principal Problem:  None                Active Insurance as of 7/3/2017     Primary Coverage     Payor Plan Insurance Group Employer/Plan Group    [facility]      Payor Plan Address Payor Plan Phone Number Effective From Effective To    [address] [address]  7/3/2017     [place] [address]       Subscriber Name Subscriber Birth Date Member ID       NISHANT WAHL 1965 [id number]                 Emergency Contacts      (Rel.) Home Phone Work Phone Mobile Phone    Yvette Pollack [phone] -- [phone]               Discharge Summary      Jairon Perez DO at 7/19/2017 10:18 AM              [facility]  DISCHARGE SUMMARY       Date of Admission: 7/3/2017  Date of Discharge:  7/19/2017  Primary Care Physician: Linda Hoffman, DNP, APRN    Presenting Problem/History of Present Illness:  Tongue swelling which preceded possible seizure activity.     Final Discharge Diagnoses:  1. Possible angioedema related to lisinopril prior to presentation and tongue bite.  2. Malignant hypertension at presentation and still difficult to control.   3. Possible " new onset seizure disorder versus convulsive syncope.  4. Posterior reversible encephalopathy syndrome on MRI.  5. Tongue bite with sublingual hematoma, much improved.  6. Tobacco abuse.  7. Obstructive sleep apnea, noncompliant with device.  8. Daily alcohol use with recent Delirium tremens requiring sedation and mechanical ventilation.  9. Hypokalemia, replaced.   10. Clostridium difficile colitis.  11. Possible lingual or sublingual infection, MSSA on culture.  12. Bilateral lower lobe infiltrates consistent with ventilator associated pneumonia combined with atelectasis.  13. Acute gouty arthritis of the left great toe.     Consults:   1. Dr. Campos and Dr. Arroyo with neurology.   2. Dr. Gallo and Dr. Petersen with pulmonary.   3. Dr. Pierson with ENT.   4. Dr. Minna Tran with general surgery.     Procedures Performed: Tracheostomy with Dr. Pierson. Central line placement by Dr. Minna Tran.      Pertinent Test Results:   Imaging Results (all)     Procedure Component Value Units Date/Time    XR Shoulder 2+ View Right [[id number]] Collected:  07/03/17 0913     Updated:  07/03/17 0918    Narrative:       EXAMINATION: XR SHOULDER 2+ VW RIGHT-     7/3/2017 10:02 AM EDT     HISTORY: Right shoulder pain.     Right shoulder, 3 views.     Old healed fracture of the right clavicle at the junction of the mid and  distal one third.  Bony hypertrophy of the distal clavicle with narrowing of the outlet and  increased risk for impingement.  No AC joint separation.     Mild humeral head spurring.     High riding humeral head with the appearance of rotator cuff  insufficiency.     Summary:  1. Bony narrowing of the outlet based on the clavicle.  2. High riding humeral head.  3. MRI correlation recommended to assess the rotator cuff integrity..  This report was finalized on 07/03/2017 09:15 by Dr. Camilo Almodovar MD.    CT Head Without Contrast [[id number]] Collected:  07/03/17 1114     Updated:  07/03/17 1123    Narrative:        EXAMINATION: CT HEAD WO CONTRAST-      7/3/2017 9:43 AM EDT     HISTORY: seizures     In order to have a CT radiation dose as low as reasonably achievable  Automated Exposure Control was utilized for adjustment of the mA and/or  KV according to patient size.     DLP in mGycm= 821.     Ill-defined low density within both occipital lobes. No mass effect. No  acute hemorrhage.     Normal ventricle size.     No skull fracture is seen.  Clear paranasal sinuses.     Summary:  1. Bilateral occipital lobe hypodensity compatible with subacute  ischemic change. MRI correlation recommended.  2. Report called to Dr. Zamarripa in the emergency room at 11:15 AM.                                   This report was finalized on 07/03/2017 11:20 by Dr. Camilo Almodovar MD.    MRI Brain With & Without Contrast [[id number]] Collected:  07/03/17 1238     Updated:  07/03/17 1306    Narrative:       MRI BRAIN W WO CONTRAST- 7/3/2017 11:57 AM CDT     HISTORY: Ischemic changes; R13.12-Dysphagia, oropharyngeal phase     COMPARISON: None.       TECHNIQUE: Multiplanar imaging of the brain was performed in a routine  fashion before and after the intravenous injection of gadolinium  contrast.     FINDINGS:   Diffusion: No restriction of diffusion to suggest acute ischemia.     Midline structures: Nondisplaced.     Ventricles: Normal in configuration and symmetric in size.     Masses: No masses or mass effect.     Basilar cisterns: Maintained.     Extra axial space: No abnormal extra-axial fluid.     Gray-white matter signal: There is abnormal signal. There is bilateral  subcortical signal on the T2 and FLAIR sequences involving the  cerebellum and posterior occipital and parietal lobes. There is no  associated enhancement. Most likely this is posterior reversible  encephalopathy syndrome. (P RES)     Cerebellum: Abnormal signal is noted in the cerebellum most likely  associated with the pres.     Brainstem: Normal.     Enhancement: No abnormal  enhancement.     Other: Proximal cervical spinal cord is normal. Bilateral globes and  orbits are normal in appearance. Normal cerebrovascular flow voids  noted. Mucous cyst is noted in the right maxillary antrum mucosal  thickening is present in the left maxillary antrum       Impression:       1. Posterior reversible encephalopathy syndrome. Follow-up in 8 weeks is  suggested.         This report was finalized on 07/03/2017 12:53 by Dr. Adolph Echols MD.    XR Chest 1 View [[id number]] Collected:  07/04/17 1543     Updated:  07/04/17 1548    Narrative:       EXAMINATION: XR CHEST 1 VW- 7/4/2017 14:43 CST     HISTORY: Intubation     Comparison: None     Findings:  Endotracheal tube is in place with tip approximately 3 cm above the  jolly. Low lung volumes are present bilaterally. There is atelectasis  at the left lung base. The lung bases otherwise appear clear. The  pulmonary vasculature appears normal.       Impression:       Impression:  1. Apparent appropriate endotracheal tube position.  2. Left basilar atelectasis.  This report was finalized on 07/04/2017 15:45 by Dr. Driss Ward MD.    XR Chest 1 View [[id number]] Collected:  07/05/17 0724     Updated:  07/05/17 0728    Narrative:       Frontal supine radiograph of the chest 7/5/2017 3:10 AM CDT     History: Intubated Patient; R13.12-Dysphagia, oropharyngeal phase;  R56.9-Unspecified convulsions; T78.3XXS-Angioneurotic edema, sequela;  F10.231-Alcohol dependence with withdrawal delirium     Comparison: 07/04/2017      Findings:   Lines and tubes are stable in position. No new opacities or  pneumothoraces are visualized in the chest. The cardiomediastinal  silhouette and pulmonary vascularity are unchanged.       No acute osseous or soft tissue abnormality is noted.        Impression:       Impression:   1. No significant interval change since previous exam.        This report was finalized on 07/05/2017 07:25 by Dr. Dilshad Silver MD.    XR Abdomen KUB  [[id number]] Collected:  07/05/17 1207     Updated:  07/05/17 1210    Narrative:       EXAMINATION:   XR ABDOMEN KUB-  7/5/2017 12:07 PM CDT     HISTORY: NG tube placement     Single view the abdomen is obtained. Nasogastric tube is present with  the distal tip satisfactorily positioned in the fundus the stomach.  Bowel gas pattern is nonspecific.       Impression:       Satisfactory placement of nasogastric tube  This report was finalized on [id number] by Dr. Adolph Echols MD.    XR Chest 1 View [[id number]] Collected:  07/06/17 0718     Updated:  07/06/17 0722    Narrative:       Frontal supine radiograph of the chest 7/6/2017 3:04 AM CDT     History: Intubated Patient; R13.12-Dysphagia, oropharyngeal phase;  R56.9-Unspecified convulsions; T78.3XXS-Angioneurotic edema, sequela;  F10.231-Alcohol dependence with withdrawal delirium     Comparison: 07/05/2017      Findings:   The endotracheal tube is stable in position. An NG tube has been  advanced into the stomach.. No new opacities or pneumothoraces are  visualized in the chest. The cardiomediastinal silhouette and pulmonary  vascularity are unchanged.       No acute osseous or soft tissue abnormality is noted.        Impression:       Impression:   1. No significant interval change since previous exam.        This report was finalized on 07/06/2017 07:19 by Dr. Dilshad Silver MD.    XR Chest 1 View [[id number]] Collected:  07/07/17 0708     Updated:  07/07/17 0712    Narrative:       EXAMINATION:   XR CHEST 1 VW-  7/7/2017 7:08 AM CDT     HISTORY: Intubated patient     Single view the chest obtained. The lungs are clear. Cardiac silhouettes  mildly enlarged. Endotracheal tube nasogastric tube are satisfactorily  position.     This compared prior study 07/06/2017.     Obdulia and stable single view chest  This report was finalized on 07/07/2017 07:09 by Dr. Adolph Echols MD.    XR Chest 1 View [[id number]] Collected:  07/07/17 2137     Updated:  07/07/17 2153       Narrative:       EXAMINATION: XR CHEST 1 VW- 7/7/2017 9:31 PM CDT     HISTORY: Central line placement.     COMPARISON: 07/07/2017.     FINDINGS:  The left subclavian central line has been placed with tip in the  proximal SVC. No pneumothorax is identified. The endotracheal and  enteric tubes remain in place. Scattered areas of subsegmental  atelectasis are present. The heart is magnified but felt to be mildly  enlarged. The pulmonary vasculature is stable.       Impression:       Interval placement of left subclavian central line without  evidence of pneumothorax.  This report was finalized on 07/07/2017 21:50 by Dr. Driss Ward MD.    US Venous Doppler Upper Extremity Left (duplex) [[id number]] Collected:  07/08/17 0719     Updated:  07/08/17 0723    Narrative:       EXAMINATION:   US VENOUS DOPPLER UPPER EXTREMITY LEFT (DUPLEX)-   7/8/2017 7:19 AM CDT     HISTORY: Venous Doppler analysis of the left upper extremity.     Irineo vein demonstrates compression and color flow.     Left subclavian vein demonstrates color flow and augmentation.     Axillary vein demonstrates compression color flow and augmentation.     The brachial vein demonstrates color flow and compression. The left  basilic vein demonstrates lack of compression above the elbow. This lack  of compression is consistent with thrombus in the basilic vein. The  cephalic vein demonstrates color flow compression. The radial vein  demonstrates color flow compression. The ulnar vein demonstrates color  flow compression.       Impression:       Thrombus is visualized in the left basilic vein above the  elbow.  This report was finalized on 07/08/2017 07:20 by Dr. Adolph Echols MD.    US Arterial Doppler Upper Extremity Left [[id number]] Collected:  07/08/17 0720     Updated:  07/08/17 0725    Narrative:       EXAMINATION:   US ARTERIAL DOPPLER UPPER EXTREMITY  LEFT-  7/8/2017 7:20  AM CDT     HISTORY: Left upper extremity duplex arterial analysis     On  this examination the left subclavian artery demonstrates color flow  and peak systolic velocity 45 cm/s. Axillary artery demonstrates color  flow with a peak systolic velocity 104 cm/s. The brachial artery  demonstrates peak systolic velocity 130 cm/s with triphasic waveform.  The radial artery demonstrates color flow and triphasic waveform peak  systolic velocity 78 cm/s. The ulnar artery demonstrates triphasic  waveform color flow and peak systolic velocity 67 cm/s.       Impression:       Negative left upper extremity arterial duplex ultrasound  This report was finalized on 07/08/2017 07:22 by Dr. Adolph Echols MD.    XR Chest 1 View [[id number]] Collected:  07/08/17 0846     Updated:  07/08/17 0935    Narrative:       EXAMINATION:   XR CHEST 1 VW-  7/8/2017 8:45 AM CDT     HISTORY: Frontal supine radiograph of the chest 7/8/2017 3:22 AM CDT     COMPARISON: 07/07/2017.     FINDINGS:   The lungs are clear. The cardiomediastinal silhouette and pulmonary  vascularity are within normal limits. Endotracheal tube is  satisfactorily positioned. Left subclavian catheter is unchanged.     The osseous structures and surrounding soft tissues demonstrate no acute  abnormality.       Impression:       1. No radiographic evidence of acute cardiopulmonary process.     This report was finalized on 07/08/2017 09:31 by Dr. Adolph Echols MD.    CT Head With & Without Contrast [[id number]] Collected:  07/08/17 1233     Updated:  07/08/17 1239    Narrative:       CT BRAIN without and with contrast dated 7/8/2017 11:43 AM CDT     HISTORY: Possible sublingual abscess     COMPARISON: None      DOSE LENGTH PRODUCT: 1779 mGy cm     In order to have a CT radiation dose as low as reasonably achievable,  Automated Exposure Control was utilized for adjustment of the mA and/or  KV according to patient size.     TECHNIQUE: Serial axial tomographic images of the brain were obtained  without and with the use of intravenous contrast.       FINDINGS:   The midline structures are nondisplaced. The ventricles and basilar  cisterns are normal in size and configuration. There is no evidence of  intracranial hemorrhage or mass-effect. The gray-white matter  differentiation is maintained. The sulci have a normal configuration,  and there are no abnormal extra-axial fluid collections. The structures  of the posterior fossa are unremarkable.      The included orbits and their contents are unremarkable.    . The  visualized osseous structures and overlying soft tissues of the skull  and face are unremarkable.      There is no abnormal enhancement.       Impression:       1. Negative CT brain without and with contrast        This report was finalized on 07/08/2017 12:36 by Dr. Adolph Echols MD.    CT Soft Tissue Neck With & Without Contrast [[id number]] Collected:  07/08/17 1258     Updated:  07/08/17 1307    Narrative:       CT SOFT TISSUE NECK W WO CONTRAST- 7/8/2017 11:43 AM CDT     HISTORY: Possible sublingual or ligual abcess; R13.12-Dysphagia,  oropharyngeal phase; R56.9-Unspecified convulsions;  T78.3XXS-Angioneurotic edema, sequela; F10.231-Alcohol dependence with  withdrawal delirium      COMPARISON: NONE      DOSE LENGTH PRODUCT: 602 mGy cm. Automated exposure control was also  utilized to decrease patient radiation dose.     TECHNIQUE: Serial helical tomographic images of the neck were obtained  in the standard fashion following the intravenous administration of  Isovue contrast.       FINDINGS:    Orbits, paranasal sinuses, and skull base: Normal     Nasopharynx: A nasogastric tube is present. Fluid is present in the  right maxillary antrum mucosal thickening is present left maxillary  antrum     Suprahyoid neck: Endotracheal tube is present. Pleural cavities obscured  by dental work. The base the tongue appears symmetric as visualized.  Submandibular glands are visualized. Sternomastoids sternocleidomastoid  muscles are symmetric.     Infrahyoid  neck: Normal larynx, hypopharynx and supraglottis.     Thyroid: Normal.     Thoracic inlet: Pleural thickening is noted in the apex of the right  chest.  Lymph nodes: Normal. No lymphadenopathy.     Vascular structures: Normal.     Bones: Degenerative disc disease and degenerative spondylosis is present  in the cervical spine.     Other findings: None.       Impression:       1. No acute abnormalities identified on the enhanced CT soft tissue the  neck.  2. Nasogastric tube and endotracheal tube are present. Artifact from  dental work is noted.  3. Fluid in the right maxillary antrum is present  4. Degenerative spondylosis in the cervical spine        This report was finalized on 07/08/2017 13:04 by Dr. Adolph Echols MD.    XR Chest 1 View [[id number]] Collected:  07/09/17 0729     Updated:  07/09/17 0733    Narrative:       EXAMINATION:   XR CHEST 1 -  7/9/2017 7:29 AM CDT     HISTORY: Patient intubated     Single view the chest obtained. Atelectasis right lung base is noted.  Left lung is clear. Cardiac silhouettes mildly enlarged. Nasogastric  tube and endotracheal tube are satisfactorily position.       Impression:       Mild atelectasis right lung base slightly increased since  July 8  This report was finalized on 07/09/2017 07:30 by Dr. Adolph Echols MD.    XR Chest 1 View [[id number]] Collected:  07/10/17 0708     Updated:  07/10/17 0712    Narrative:       EXAMINATION:   XR CHEST 1 -  7/10/2017 7:08 AM CDT     HISTORY: Intubated     Single view the chest obtained. Left lung is clear. Cardiac silhouettes  mildly enlarged unchanged from July 9. Left subclavian catheter is  present. Nasogastric tubes present. Mild atelectasis right lung base is  present.       Impression:       Atelectasis right lung base persists not significantly  changed from July 9.        2. Mild-to-moderate cardiomegaly also unchanged  This report was finalized on 07/10/2017 07:09 by Dr. Adolph Echols MD.    XR Abdomen KUB  [[id number]] Collected:  07/10/17 1314     Updated:  07/10/17 1319    Narrative:       HISTORY: Abdomen distended and firm.     FINDINGS: KUB radiograph demonstrates a nonspecific bowel gas pattern.  There is noted to be a distended loop of what I suspect represents the  sigmoid colon with a sigmoid volvulus in the differential. I do not see  evidence of obstruction or pneumatosis. There is no evidence of  pneumoperitoneum.       Impression:       . Nonspecific bowel gas pattern with gas in both small bowel  and colon. There is some asymmetric distention of what appears to be a  loop of the sigmoid colon with a sigmoid volvulus a differential  although considered unlikely given the lack of distention of the more  proximal bowel and the patient's age. If symptoms are persistent follow  up with CT imaging could be obtained for further characterization.  This report was finalized on 07/10/2017 13:16 by Dr. Dilshad Silver MD.    CT Abdomen Pelvis Without Contrast [[id number]] Collected:  07/10/17 2035     Updated:  07/10/17 2047    Narrative:       EXAMINATION: CT ABDOMEN PELVIS WO CONTRAST- 7/10/2017 8:35 PM CDT     HISTORY: Abdominal distention and pain, possible sigmoid volvulus.     DOSE: 1821 mGycm (Automatic exposure control technique was implemented  in an effort to keep the radiation dose as low as possible without  compromising image quality)     REPORT: Spiral CT of the abdomen and pelvis was performed without  intravenous or oral contrast from the lung bases through the pubic  symphysis. Reconstructed coronal and sagittal images are also reviewed.  Comparison: KUB on the same date. There is no previous CT. Lung windows  demonstrate consolidation of the lower lobe laterally with air  bronchograms, probable bilateral pneumonia mixed with atelectasis. There  are tiny bilateral pleural effusions. A nasogastric tube is present in  good position. There is mild cardiomegaly. The liver and spleen  appear  homogeneous. The gallbladder is slightly contracted. Pancreas and  adrenal glands are within normal limits. No nephrolithiasis is seen and  there is no hydronephrosis. There is perinephric fat stranding  bilaterally, which is nonspecific. There is no evidence of bowel  obstruction or sigmoid volvulus. The sigmoid colon is redundant. There  is moderate sigmoid diverticulosis. There is nonspecific increased  attenuation of fat planes in the pelvis, including presacral fat planes  and fat planes at the level of the aortic bifurcation and inguinal  regions. There is also mildly increased attenuation of fat planes over  the flanks, slightly greater on the right. There are fat containing  inguinal hernias bilaterally. No intra-abdominal abscess or free air is  identified. The inflammatory changes do not appear to be associated with  the sigmoid colon. The appendix is normal. There is a small  fat-containing periumbilical hernia that measures 2.6 cm. Review of bone  windows is unremarkable.       Impression:       1. No evidence of bowel obstruction or sigmoid volvulus. There is  moderate sigmoid diverticulosis without acute diverticulitis.  2. Nonspecific mild fat stranding/inflammation within the  retroperitoneum and extraperitoneal fat planes in the pelvis, without  evidence of an abscess.  3. Extensive infiltrates in the lower lobes with air bronchograms  suspicious for acute lateral lower lobe pneumonia probably combined with  atelectasis.        This report was finalized on 07/10/2017 20:44 by Dr. Marvin Mar MD.    XR Chest 1 View [[id number]] Collected:  07/11/17 0722     Updated:  07/11/17 0726    Narrative:       EXAMINATION:   XR CHEST 1 VW-  7/11/2017 7:22 AM CDT     HISTORY: Respiratory failure.     Frontal upright radiograph of the chest 7/11/2017 3:15 AM CDT     COMPARISON: 07/10/2017.     FINDINGS:   The lungs are clear. Cardiac silhouettes mildly enlarged. Left  subclavian catheter and  endotracheal tube are present and satisfactorily  positioned.      The osseous structures and surrounding soft tissues demonstrate no acute  abnormality.       Impression:       1. No radiographic evidence of acute cardiopulmonary process.        This report was finalized on 07/11/2017 07:23 by Dr. Adolph Echols MD.    XR Chest 1 View [[id number]] Collected:  07/12/17 0705     Updated:  07/12/17 0710    Narrative:       EXAMINATION:   XR CHEST 1 VW-  7/12/2017 7:05 AM CDT     HISTORY: Respiratory failure     Single view the chest obtained. Left lung is clear. Mild right  infrahilar atelectasis is present. Left diaphragms indistinct consistent  with atelectasis left lower lobe. Infiltrate tube nasogastric tube are  present. Left subclavian catheter is present.     IMPRESSION  1. Atelectasis left lower lobe.        2. Mild atelectasis present in the right infrahilar region.  This report was finalized on 07/12/2017 07:07 by Dr. Adolph Echols MD.    XR Abdomen KUB [[id number]] Collected:  07/12/17 0713     Updated:  07/12/17 0716    Narrative:       EXAMINATION:   XR ABDOMEN KUB-  7/12/2017 7:13 AM CDT     HISTORY: NG tube     Single view the abdomen is obtained. Nasogastric tube is present  satisfactorily positioned in the fundus the stomach.       Impression:       Satisfactory placement of nasogastric tube  This report was finalized on 07/12/2017 07:13 by Dr. Adolph Echols MD.    XR Chest 1 View [[id number]] Collected:  07/13/17 0705     Updated:  07/13/17 0709    Narrative:       EXAMINATION: XR CHEST 1 VW-. 7/13/2017 7:05 AM CDT     CHEST, ONE VIEW:     HISTORY: Respiratory failure     COMPARISON: 07/12/2017     A single frontal chest radiograph was obtained.     FINDINGS:     Tracheostomy tube and NG tube identified. Left-sided central venous  catheter observed.     Patchy lower lobe airspace opacities again noted with diminished lung  volumes with shallow inspiration likely stable.                                      Impression:       1. Stable one-day appearance the chest.     This report was finalized on 07/13/2017 07:06 by Dr. Pierre Pan MD.    US Renal Artery Complete [[id number]] Collected:  07/12/17 2008     Updated:  07/13/17 1645    Narrative:       EXAMINATION: US RENAL ARTERY COMPLETE- 7/12/2017 8:08 PM CDT     HISTORY: Uncontrolled hypertension.     REPORT: Sonographic images of the kidneys were obtained, examination  includes Doppler analysis of the renal arteries, using color, gray scale  and spectral Doppler techniques were duplex study. There are no  comparison studies.     The right kidney measures 10.3 x 5.5 x 4.9 cm and has normal morphology  and echogenicity. There is no hydronephrosis. The tail flow velocities  are provided on the ultrasound worksheet, however the origin the right  renal artery is obscured. The mid right renal artery, the peak systolic  velocity measures 116.8 cm/s, this corresponds with the velocity ratio  compared to the abdominal aorta of 1.72. The resistive index measures  0.55. Velocities in the distal right renal arteries are normal.     Left kidney measures 11.5 x 6.7 x 5.3 cm and has normal morphology and  echogenicity, there is no hydronephrosis on the left. Doppler images are  limited for the left kidney, both the origin and left mid renal artery  are obscured. The PSV at the distal left renal artery measures 99.2 cm/s  with a velocity ratio compared to the abdominal aorta of 1.46 and  resistive index of 0.60. The bladder appears unremarkable.       Impression:       1. Normal morphological appearance of both kidneys.  2. Limited study with obscuration of the renal artery origins as well as  the mid left renal artery, no Doppler evidence for significant renal  artery stenosis is seen however. If more detailed evaluation is  indicated clinically, CTA would be the best study.  This report was finalized on 07/12/2017 20:13 by Dr. Marvin Mar MD.    US Renal Bilateral  [[id number]] Collected:  07/12/17 2008     Updated:  07/13/17 1645    Narrative:       EXAMINATION: US RENAL ARTERY COMPLETE- 7/12/2017 8:08 PM CDT     HISTORY: Uncontrolled hypertension.     REPORT: Sonographic images of the kidneys were obtained, examination  includes Doppler analysis of the renal arteries, using color, gray scale  and spectral Doppler techniques were duplex study. There are no  comparison studies.     The right kidney measures 10.3 x 5.5 x 4.9 cm and has normal morphology  and echogenicity. There is no hydronephrosis. The tail flow velocities  are provided on the ultrasound worksheet, however the origin the right  renal artery is obscured. The mid right renal artery, the peak systolic  velocity measures 116.8 cm/s, this corresponds with the velocity ratio  compared to the abdominal aorta of 1.72. The resistive index measures  0.55. Velocities in the distal right renal arteries are normal.     Left kidney measures 11.5 x 6.7 x 5.3 cm and has normal morphology and  echogenicity, there is no hydronephrosis on the left. Doppler images are  limited for the left kidney, both the origin and left mid renal artery  are obscured. The PSV at the distal left renal artery measures 99.2 cm/s  with a velocity ratio compared to the abdominal aorta of 1.46 and  resistive index of 0.60. The bladder appears unremarkable.       Impression:       1. Normal morphological appearance of both kidneys.  2. Limited study with obscuration of the renal artery origins as well as  the mid left renal artery, no Doppler evidence for significant renal  artery stenosis is seen however. If more detailed evaluation is  indicated clinically, CTA would be the best study.  This report was finalized on 07/12/2017 20:13 by Dr. Marvin Mar MD.    XR Chest 1 View [[id number]] Collected:  07/14/17 0721     Updated:  07/14/17 0725    Narrative:       Frontal supine radiograph of the chest 7/14/2017 2:10 CST     History: respiratory  failure, patient intubated.; R13.12-Dysphagia,  oropharyngeal phase; R56.9-Unspecified convulsions;  T78.3XXS-Angioneurotic edema, sequela; F10.231-Alcohol dependence with  withdrawal delirium; Z74.09-Other reduced mobility     Comparison: Chest x-ray dated 07/13/2017      Findings:   Right PICC in place terminating near the atrial caval junction. Left  subclavian catheter has been removed. Remaining lines and tubes are  stable in position. No new opacities or pneumothoraces are visualized in  the chest. The cardiomediastinal silhouette and pulmonary vascularity  are unchanged.       No acute osseous or soft tissue abnormality is noted.        Impression:       Impression:    No significant interval change since previous exam.        This report was finalized on 07/14/2017 07:22 by Dr. Martita Finnegan MD.    XR Abdomen KUB [[id number]] Collected:  07/14/17 1254     Updated:  07/14/17 1257    Narrative:       EXAMINATION:   XR ABDOMEN KUB-  7/14/2017 12:54 PM CDT     HISTORY: NG tube placement     Single view the abdomen is obtained. Nasogastric tube is present  satisfactorily position in the fundus the stomach.       Impression:       Satisfactory placement nasogastric tube  This report was finalized on 07/14/2017 12:54 by Dr. Adolph Echols MD.    XR Chest 1 View [[id number]] Collected:  07/15/17 1012     Updated:  07/15/17 1017    Narrative:       HISTORY: Respiratory failure, intubated     CXR: A frontal view the chest is obtained.     Comparison: 07/14/2017     Findings: Tracheostomy tube is appropriate in position. The prior  enteric tube has been removed. A new right upper extremity PICC line is  in place with the tip near the atrial caval junction.     There is a diminished level of inspiration. There is mild elevation of  the right hemidiaphragm. There are left lower lobe opacities. There is a  questionable small left pleural effusion. There is no pneumothorax. The  cardiomediastinal contours are similar        Impression:       1. Removal of the enteric tube with placement of a right upper extremity  PICC line. Tracheostomy tube appropriate in position.  2. Left lower lobe opacities may be patchy atelectasis. Consolidation  considered.      This report was finalized on 07/15/2017 10:14 by Dr. Sri Rivera MD.    US Venous Doppler Lower Extremity Bilateral (duplex) [[id number]] Collected:  07/16/17 1022     Updated:  07/16/17 1025    Narrative:       DUPLEX ULTRASOUND OF THE LOWER EXTREMITIES 7/16/2017 8:44 CST     HISTORY: BLE edema & pain; R13.12-Dysphagia, oropharyngeal phase;  R56.9-Unspecified convulsions; T78.3XXS-Angioneurotic edema, sequela;  F10.231-Alcohol dependence with withdrawal delirium; Z74.09-Other  reduced mobility     COMPARISON: NONE     FINDINGS:  Transverse and longitudinal grayscale and Doppler sonographic images of  bilateral lower extremities were obtained.     There is normal flow and compressibility of bilateral common femoral,  superficial femoral, popliteal, peroneal, anterior tibial, and posterior  tibial veins.       Impression:       1. Normal duplex ultrasound of bilateral lower extremities without  evidence of DVT.     This report was finalized on 07/16/2017 10:22 by Dr. Sri Rivera MD.    FL Video Swallow With Speech [[id number]] Collected:  07/17/17 1339     Updated:  07/17/17 1343    Narrative:       EXAMINATION: FL VIDEO SWALLOW W SPEECH-  7/17/2017 2:39 PM EDT     HISTORY: Dysphagia     COMPARISON:None     TECHNIQUE:     Image number: 1     Fluoroscopy time: 1.2 minutes     FINDINGS:     The oral and pharyngeal phase of swallowing was evaluated with multiple  barium consistencies.     Patient tolerated all consistencies well without laryngeal penetration  or aspiration.       Impression:       1. Negative dysphagia study.  This report was finalized on 07/17/2017 13:40 by Dr. Pierre Pan MD.        Lab Results (all)     Procedure Component Value Units Date/Time    CBC &  Differential [[id number]] Collected:  07/03/17 0807    Specimen:  Blood Updated:  07/03/17 0832    Narrative:       The following orders were created for panel order CBC & Differential.  Procedure                               Abnormality         Status                     ---------                               -----------         ------                     CBC Auto Differential[[id number]]        Abnormal            Final result                 Please view results for these tests on the individual orders.    CBC Auto Differential [[id number]]  (Abnormal) Collected:  07/03/17 0807    Specimen:  Blood Updated:  07/03/17 0832     WBC 11.95 (H) 10*3/mm3      RBC 3.93 (L) 10*6/mm3      Hemoglobin 11.9 (L) g/dL      Hematocrit 34.1 (L) %      MCV 86.8 fL      MCH 30.3 pg      MCHC 34.9 g/dL      RDW 14.0 %      RDW-SD 44.0 fl      MPV 9.6 fL      Platelets 245 10*3/mm3      Neutrophil % 82.0 (H) %      Lymphocyte % 8.3 (L) %      Monocyte % 8.9 %      Eosinophil % 0.3 %      Basophil % 0.2 %      Immature Grans % 0.3 %      Neutrophils, Absolute 9.81 (H) 10*3/mm3      Lymphocytes, Absolute 0.99 10*3/mm3      Monocytes, Absolute 1.06 10*3/mm3      Eosinophils, Absolute 0.03 10*3/mm3      Basophils, Absolute 0.02 10*3/mm3      Immature Grans, Absolute 0.04 (H) 10*3/mm3     Comprehensive Metabolic Panel [[id number]]  (Abnormal) Collected:  07/03/17 0807    Specimen:  Blood Updated:  07/03/17 0841     Glucose 127 (H) mg/dL      BUN 9 mg/dL      Creatinine 1.19 mg/dL      Sodium 141 mmol/L      Potassium 3.0 (L) mmol/L      Chloride 102 mmol/L      CO2 29.0 mmol/L      Calcium 8.8 mg/dL      Total Protein 6.9 g/dL      Albumin 4.00 g/dL      ALT (SGPT) 39 U/L      AST (SGOT) 72 (H) U/L      Alkaline Phosphatase 69 U/L      Total Bilirubin 0.6 mg/dL      eGFR Non African Amer 64 mL/min/1.73      Globulin 2.9 gm/dL      A/G Ratio 1.4 g/dL      BUN/Creatinine Ratio 7.6     Anion Gap 10.0 mmol/L     Urinalysis With / Culture If  Indicated [[id number]]  (Abnormal) Collected:  07/03/17 0834    Specimen:  Urine from Urine, Clean Catch Updated:  07/03/17 0858     Color, UA Yellow     Appearance, UA Clear     pH, UA 7.5     Specific Gravity, UA 1.014     Glucose, UA Negative     Ketones, UA Negative     Bilirubin, UA Negative     Blood, UA Small (1+) (A)     Protein, UA 30 mg/dL (1+) (A)     Leuk Esterase, UA Negative     Nitrite, UA Negative     Urobilinogen, UA 0.2 E.U./dL    Urinalysis, Microscopic Only [[id number]]  (Abnormal) Collected:  07/03/17 0834    Specimen:  Urine from Urine, Clean Catch Updated:  07/03/17 0858     RBC, UA 3-5 (A) /HPF      WBC, UA 0-2 (A) /HPF      Bacteria, UA None Seen /HPF      Squamous Epithelial Cells, UA 0-2 /HPF      Hyaline Casts, UA None Seen /LPF      Methodology Automated Microscopy    Urine Drug Screen [[id number]]  (Normal) Collected:  07/03/17 0834    Specimen:  Urine from Urine, Clean Catch Updated:  07/03/17 0930     Amphetamine Screen, Urine Negative     Barbiturates Screen, Urine Negative     Benzodiazepine Screen, Urine Negative     Cocaine Screen, Urine Negative     Methadone Screen, Urine Negative     Opiate Screen Negative     Phencyclidine (PCP), Urine Negative     THC, Screen, Urine Negative    Narrative:       Negative Thresholds For Drugs Screened in Urine:    Amphetamines          500 ng/ml  Barbiturates          200 ng/ml  Benzodiazepines       200 ng/ml  Cocaine               150 ng/ml  Methadone             150 ng/ml  Opiates               300 ng/ml  Phencyclidine         25 ng/ml  THC                      50 ng/ml    The normal value for all drugs tested is negative. This report includes final unconfirmed screening results.  A positive result by this assay can be, at your request, sent to the [facility] for confirmation by gas chromatography. Unconfirmed results must not be used for non-medical purposes, such as employment or legal testing. Clinical consideration should be applied to any  drug of abuse test result, particularly when unconfirmed results are used.    Basic Metabolic Panel [[id number]]  (Abnormal) Collected:  07/04/17 0937    Specimen:  Blood Updated:  07/04/17 0957     Glucose 126 (H) mg/dL      BUN 12 mg/dL      Creatinine 1.13 mg/dL      Sodium 142 mmol/L      Potassium 3.8 mmol/L      Chloride 105 mmol/L      CO2 26.0 mmol/L      Calcium 9.1 mg/dL      eGFR Non African Amer 68 mL/min/1.73      BUN/Creatinine Ratio 10.6     Anion Gap 11.0 mmol/L     Narrative:       GFR Normal >60  Chronic Kidney Disease <60  Kidney Failure <15    Magnesium [[id number]]  (Normal) Collected:  07/04/17 0937    Specimen:  Blood Updated:  07/04/17 0957     Magnesium 2.0 mg/dL     Blood Gas, Arterial [[id number]]  (Abnormal) Collected:  07/04/17 1635    Specimen:  Arterial Blood Updated:  07/04/17 1640     Site Arterial: left brachial     Gera's Test --      Documented in Rapid [facility]        pH, Arterial 7.408 pH units      pCO2, Arterial 44.1 mm Hg      pO2, Arterial 235.2 (H) mm Hg      HCO3, Arterial 27.2 (H) mmol/L      Base Excess, Arterial 2.1 (H) mmol/L      O2 Saturation, Arterial 99.5 %      O2 Saturation Calculated 99.5 %      Barometric Pressure for Blood Gas -- mmHg       Component not reported at this site.        Modality Ventilator     FIO2 60 %      Ventilator Mode AC     Rate 10.0 Breaths/minute      PEEP 5.0     Vent CPAP/PEEP 5.0    Narrative:       Serial Number: [id number]    : [id number]    CBC (No Diff) [[id number]]  (Abnormal) Collected:  07/05/17 0158    Specimen:  Blood Updated:  07/05/17 0213     WBC 15.95 (H) 10*3/mm3      RBC 4.19 (L) 10*6/mm3      Hemoglobin 12.8 (L) g/dL      Hematocrit 38.1 (L) %      MCV 90.9 fL      MCH 30.5 pg      MCHC 33.6 g/dL      RDW 14.7 %      RDW-SD 49.1 fl      MPV 9.6 fL      Platelets 246 10*3/mm3     Blood Gas, Arterial [[id number]]  (Abnormal) Collected:  07/05/17 0224    Specimen:  Arterial Blood Updated:  07/05/17 0227     Site Arterial: right  radial     Gera's Test --      Documented in Rapid [facility]        pH, Arterial 7.408 pH units      pCO2, Arterial 40.7 mm Hg      pO2, Arterial 101.5 (H) mm Hg      HCO3, Arterial 25.1 mmol/L      Base Excess, Arterial 0.4 mmol/L      O2 Saturation, Arterial 97.7 %      O2 Saturation Calculated 97.7 %      Barometric Pressure for Blood Gas -- mmHg       Component not reported at this site.        Modality Ventilator     FIO2 50 %      Ventilator Mode Assist     Rate 10.0 Breaths/minute      PEEP 5.0     Vent CPAP/PEEP 5.0    Narrative:       Serial Number: [id number]    : [id number]    Basic Metabolic Panel [[id number]]  (Normal) Collected:  07/05/17 0158    Specimen:  Blood Updated:  07/05/17 0229     Glucose 96 mg/dL      BUN 16 mg/dL      Creatinine 1.10 mg/dL      Sodium 143 mmol/L      Potassium 3.7 mmol/L      Chloride 105 mmol/L      CO2 28.0 mmol/L      Calcium 9.1 mg/dL      eGFR Non African Amer 71 mL/min/1.73      BUN/Creatinine Ratio 14.5     Anion Gap 10.0 mmol/L     Narrative:       GFR Normal >60  Chronic Kidney Disease <60  Kidney Failure <15    Blood Gas, Arterial [[id number]]  (Abnormal) Collected:  07/05/17 0322    Specimen:  Arterial Blood Updated:  07/05/17 0326     Site Arterial: right radial     Gera's Test --      Documented in Rapid [facility]        pH, Arterial 7.414 pH units      pCO2, Arterial 43.0 mm Hg      pO2, Arterial 86.5 mm Hg      HCO3, Arterial 26.9 (H) mmol/L      Base Excess, Arterial 2.0 mmol/L      O2 Saturation, Arterial 96.6 %      O2 Saturation Calculated 96.6 %      Barometric Pressure for Blood Gas -- mmHg       Component not reported at this site.        Modality Ventilator     FIO2 30 %      Ventilator Mode Assist     Rate 10.0 Breaths/minute      PEEP 5.0     Vent CPAP/PEEP 5.0    Narrative:       Serial Number: [id number]    : [id number]    CBC (No Diff) [[id number]]  (Abnormal) Collected:  07/06/17 0222    Specimen:  Blood Updated:  07/06/17 0256     WBC 9.27 10*3/mm3      RBC  3.94 (L) 10*6/mm3      Hemoglobin 12.1 (L) g/dL      Hematocrit 35.4 (L) %      MCV 89.8 fL      MCH 30.7 pg      MCHC 34.2 g/dL      RDW 14.7 %      RDW-SD 48.2 fl      MPV 9.7 fL      Platelets 216 10*3/mm3     Basic Metabolic Panel [[id number]]  (Abnormal) Collected:  07/06/17 0222    Specimen:  Blood Updated:  07/06/17 0257     Glucose 87 mg/dL      BUN 15 mg/dL      Creatinine 0.98 mg/dL      Sodium 142 mmol/L      Potassium 3.0 (L) mmol/L      Chloride 108 mmol/L      CO2 27.0 mmol/L      Calcium 8.4 mg/dL      eGFR Non African Amer 81 mL/min/1.73      BUN/Creatinine Ratio 15.3     Anion Gap 7.0 mmol/L     Narrative:       GFR Normal >60  Chronic Kidney Disease <60  Kidney Failure <15    Magnesium [[id number]]  (Normal) Collected:  07/06/17 0222    Specimen:  Blood Updated:  07/06/17 0257     Magnesium 2.1 mg/dL     Blood Gas, Arterial [[id number]] Collected:  07/06/17 0356    Specimen:  Arterial Blood Updated:  07/06/17 0401     Site Arterial: right radial     Gera's Test --      Documented in Rapid [facility]        pH, Arterial 7.430 pH units      pCO2, Arterial 35.9 mm Hg      pO2, Arterial 95.6 mm Hg      HCO3, Arterial 23.3 mmol/L      Base Excess, Arterial -0.5 mmol/L      O2 Saturation, Arterial 97.5 %      O2 Saturation Calculated 97.5 %      Barometric Pressure for Blood Gas -- mmHg       Component not reported at this site.        Modality Ventilator     FIO2 30 %      Ventilator Mode AC     Rate 10.0 Breaths/minute      PEEP 5.0     Vent CPAP/PEEP 5.0    Narrative:       Serial Number: [id number]    : [id number]    POC Glucose Fingerstick [[id number]]  (Normal) Collected:  07/06/17 0738    Specimen:  Blood Updated:  07/06/17 0750     Glucose 104 mg/dL       : [id number] Azalia Gross LMeter ID: [id number]       Gastrointestinal Panel, PCR [[id number]]  (Abnormal) Collected:  07/06/17 2327    Specimen:  Stool from Per Rectum Updated:  07/07/17 0124     Campylobacter Not Detected     Clostridium difficile  (toxin A/B) Detected (A)        Due to the high asymptomatic carriage rates, especially in young children, the clinical relevance of the detection of toxigenic C. difficile from stool should be considered in the context of other clinical findings, patient age, and risk factors including hospitalization and antibiotic exposure.        Plesiomonas shigelloides Not Detected     Salmonella Not Detected     Vibrio Not Detected     Vibrio cholerae Not Detected     Yersinia enterocolitica Not Detected     Enteroaggregative E. coli (EAEC) Not Detected     Enteropathogenic E. coli (EPEC) Not Detected     Enterotoxigenic E. coli (ETEC) lt/st Not Detected     Shiga-like toxin-producing E. coli (STEC) stx1/stx2 Not Detected     E. coli O157 Not Detected     Shigella/Enteroinvasive E. coli (EIEC) Not Detected     Cryptosporidium Not Detected     Cyclospora cayetanensis Not Detected     Entamoeba histolytica Not Detected     Giardia lamblia Not Detected     Adenovirus F40/41 Not Detected     Astrovirus Not Detected     Norovirus GI/GII Not Detected     Rotavirus A Not Detected     Sapovirus (I, II, IV or V) Not Detected    Basic Metabolic Panel [[id number]]  (Abnormal) Collected:  07/07/17 0204    Specimen:  Blood Updated:  07/07/17 0317     Glucose 119 (H) mg/dL      BUN 14 mg/dL      Creatinine 1.05 mg/dL      Sodium 142 mmol/L      Potassium 3.2 (L) mmol/L      Chloride 111 (H) mmol/L      CO2 23.0 (L) mmol/L      Calcium 8.0 (L) mg/dL      eGFR Non African Amer 74 mL/min/1.73      BUN/Creatinine Ratio 13.3     Anion Gap 8.0 mmol/L     Narrative:       GFR Normal >60  Chronic Kidney Disease <60  Kidney Failure <15    Blood Gas, Arterial [[id number]]  (Abnormal) Collected:  07/07/17 0339    Specimen:  Arterial Blood Updated:  07/07/17 0343     Site Arterial: right radial     Gera's Test --      Documented in Rapid [facility]        pH, Arterial 7.434 pH units      pCO2, Arterial 31.5 (L) mm Hg      pO2, Arterial 76.4 (L) mm Hg       HCO3, Arterial 20.6 (L) mmol/L      Base Excess, Arterial -2.5 (L) mmol/L      O2 Saturation, Arterial 95.8 %      O2 Saturation Calculated 95.8 %      Barometric Pressure for Blood Gas -- mmHg       Component not reported at this site.        Modality Ventilator     FIO2 30 %      Ventilator Mode AC     Rate 10.0 Breaths/minute      PEEP 5.0     Vent CPAP/PEEP 5.0    Narrative:       Serial Number: [id number]    : [id number]    Clostridium Difficile Toxin, PCR [[id number]]  (Abnormal) Collected:  07/06/17 1844    Specimen:  Stool from Per Rectum Updated:  07/07/17 1348     C. Difficile Toxins by PCR Positive (A)    Blood Gas, Arterial [[id number]]  (Abnormal) Collected:  07/08/17 0214    Specimen:  Arterial Blood Updated:  07/08/17 0217     Site Arterial: right radial     Gera's Test --      Documented in Rapid [facility]        pH, Arterial 7.422 pH units      pCO2, Arterial 35.8 mm Hg      pO2, Arterial 71.1 (L) mm Hg      HCO3, Arterial 22.8 mmol/L      Base Excess, Arterial -1.1 mmol/L      O2 Saturation, Arterial 94.7 %      O2 Saturation Calculated 94.7 %      Barometric Pressure for Blood Gas -- mmHg       Component not reported at this site.        Modality Ventilator     FIO2 30 %      Ventilator Mode Assist     Rate 10.0 Breaths/minute      PEEP 5.0     Vent CPAP/PEEP 5.0    Narrative:       Serial Number: [id number]    : [id number]    Basic Metabolic Panel [[id number]]  (Abnormal) Collected:  07/08/17 0411    Specimen:  Blood Updated:  07/08/17 0451     Glucose 131 (H) mg/dL      BUN 11 mg/dL      Creatinine 0.98 mg/dL      Sodium 143 mmol/L      Potassium 3.5 mmol/L      Chloride 111 (H) mmol/L      CO2 21.0 (L) mmol/L      Calcium 8.0 (L) mg/dL      eGFR Non African Amer 81 mL/min/1.73      BUN/Creatinine Ratio 11.2     Anion Gap 11.0 mmol/L     Narrative:       GFR Normal >60  Chronic Kidney Disease <60  Kidney Failure <15    CBC & Differential [[id number]] Collected:  07/08/17 1057    Specimen:  Blood Updated:   07/08/17 1118    Narrative:       The following orders were created for panel order CBC & Differential.  Procedure                               Abnormality         Status                     ---------                               -----------         ------                     CBC Auto Differential[[id number]]        Abnormal            Final result                 Please view results for these tests on the individual orders.    CBC Auto Differential [[id number]]  (Abnormal) Collected:  07/08/17 1057    Specimen:  Blood from Arm, Right Updated:  07/08/17 1118     WBC 12.30 (H) 10*3/mm3      RBC 3.49 (L) 10*6/mm3      Hemoglobin 10.4 (L) g/dL      Hematocrit 31.5 (L) %      MCV 90.3 fL      MCH 29.8 pg      MCHC 33.0 g/dL      RDW 14.6 %      RDW-SD 48.3 fl      MPV 9.8 fL      Platelets 199 10*3/mm3      Neutrophil % 71.1 %      Lymphocyte % 11.7 (L) %      Monocyte % 13.4 (H) %      Eosinophil % 2.6 %      Basophil % 0.2 %      Immature Grans % 1.0 %      Neutrophils, Absolute 8.75 (H) 10*3/mm3      Lymphocytes, Absolute 1.44 10*3/mm3      Monocytes, Absolute 1.65 (H) 10*3/mm3      Eosinophils, Absolute 0.32 10*3/mm3      Basophils, Absolute 0.02 10*3/mm3      Immature Grans, Absolute 0.12 (H) 10*3/mm3     Blood Gas, Arterial [[id number]] Collected:  07/09/17 0159    Specimen:  Arterial Blood Updated:  07/09/17 0203     Site Arterial: right radial     Gera's Test --      Documented in Rapid [facility]        pH, Arterial 7.417 pH units      pCO2, Arterial 36.3 mm Hg      pO2, Arterial 80.8 mm Hg      HCO3, Arterial 22.9 mmol/L      Base Excess, Arterial -1.1 mmol/L      O2 Saturation, Arterial 96.2 %      O2 Saturation Calculated 96.2 %      Barometric Pressure for Blood Gas -- mmHg       Component not reported at this site.        Modality Ventilator     FIO2 30 %      Ventilator Mode Assist     Rate 10.0 Breaths/minute      PEEP 5.0     Vent CPAP/PEEP 5.0    Narrative:       Serial Number: [id number]    : [id number]     Basic Metabolic Panel [[id number]]  (Abnormal) Collected:  07/09/17 0348    Specimen:  Blood Updated:  07/09/17 0406     Glucose 108 (H) mg/dL      BUN 11 mg/dL      Creatinine 0.91 mg/dL      Sodium 143 mmol/L      Potassium 3.7 mmol/L      Chloride 111 (H) mmol/L      CO2 22.0 (L) mmol/L      Calcium 8.0 (L) mg/dL      eGFR Non African Amer 88 mL/min/1.73      BUN/Creatinine Ratio 12.1     Anion Gap 10.0 mmol/L     Narrative:       GFR Normal >60  Chronic Kidney Disease <60  Kidney Failure <15    Urinalysis With / Culture If Indicated [[id number]]  (Abnormal) Collected:  07/09/17 1100    Specimen:  Urine from Urine, Clean Catch Updated:  07/09/17 1109     Color, UA Dark Yellow (A)     Appearance, UA Clear     pH, UA 5.5     Specific Gravity, UA 1.022     Glucose, UA Negative     Ketones, UA Negative     Bilirubin, UA Negative     Blood, UA Negative     Protein, UA Negative     Leuk Esterase, UA Negative     Nitrite, UA Negative     Urobilinogen, UA 1.0 E.U./dL    Narrative:       Urine microscopic not indicated.    Blood Gas, Arterial [[id number]]  (Abnormal) Collected:  07/10/17 0255    Specimen:  Arterial Blood Updated:  07/10/17 0259     Site Arterial: right radial     Gera's Test --      Documented in Rapid [facility]        pH, Arterial 7.443 pH units      pCO2, Arterial 32.9 (L) mm Hg      pO2, Arterial 55.9 (L) mm Hg      HCO3, Arterial 22.0 mmol/L      Base Excess, Arterial -1.2 mmol/L      O2 Saturation, Arterial 90.6 (L) %      O2 Saturation Calculated 90.6 (L) %      Barometric Pressure for Blood Gas -- mmHg       Component not reported at this site.        Modality Ventilator     FIO2 30 %      Rate 10.0 Breaths/minute      PEEP 5.0     Vent CPAP/PEEP 5.0    Narrative:       Serial Number: [id number]    : [id number]    Comprehensive Metabolic Panel [[id number]]  (Abnormal) Collected:  07/10/17 0338    Specimen:  Blood Updated:  07/10/17 0404     Glucose 182 (H) mg/dL      BUN 13 mg/dL      Creatinine 0.98 mg/dL       Sodium 144 mmol/L      Potassium 4.1 mmol/L      Chloride 111 (H) mmol/L      CO2 24.0 mmol/L      Calcium 8.6 mg/dL      Total Protein 6.5 g/dL      Albumin 3.40 (L) g/dL      ALT (SGPT) 42 U/L      AST (SGOT) 51 (H) U/L      Alkaline Phosphatase 155 (H) U/L      Total Bilirubin 0.5 mg/dL      eGFR Non African Amer 81 mL/min/1.73      Globulin 3.1 gm/dL      A/G Ratio 1.1 g/dL      BUN/Creatinine Ratio 13.3     Anion Gap 9.0 mmol/L     Respiratory Culture [[id number]]  (Abnormal)  (Susceptibility) Collected:  07/08/17 0403    Specimen:  Sputum from NT Suction Updated:  07/10/17 0744     Respiratory Culture Heavy growth (4+) Staphylococcus aureus (A)     BETA LACTAMASE Positive     Gram Stain Result Greater than 25 WBCs per low power field      Many (4+) Mixed gram positive evelio    Narrative:       For Staphylococcus, penicillin-resistant, oxacillin-susceptible strains are resistant to B-lactamase labile penicillins but susceptible to other B-lactamase stable penicillins, B-lactamase inhibitor combinations, relavant cephems, and carbapenems.    Susceptibility      Staphylococcus aureus     [place]     Clindamycin <=0.25 ug/ml Susceptible     Erythromycin <=0.25 ug/ml Susceptible     Gentamicin <=0.5 ug/ml Susceptible     Inducible Clindamycin Resistance NEG  Negative     Levofloxacin <=0.12 ug/ml Susceptible  [1]      Oxacillin 0.5 ug/ml Susceptible     Penicillin G >=0.5 ug/ml Resistant     Tetracycline <=1 ug/ml Susceptible     Trimethoprim + Sulfamethoxazole <=10 ug/ml Susceptible     Vancomycin <=0.5 ug/ml Susceptible            [1]   Staphylococcus species may develop resistance during prolonged therapy with quinolones. Isolates that are initially susceptible may become resistant within three to four days after initiation of therapy. Testing of repeat isolates may be warranted.              Susceptibility Comments     Staphylococcus aureus      This isolate does not demonstrate inducible clindamycin resistance  in vitro.               Vancomycin, Trough [[id number]]  (Normal) Collected:  07/10/17 0830    Specimen:  Blood Updated:  07/10/17 0906     Vancomycin Trough 12.99 mcg/mL     Basic Metabolic Panel [[id number]]  (Abnormal) Collected:  07/11/17 0142    Specimen:  Blood Updated:  07/11/17 0222     Glucose 143 (H) mg/dL      BUN 21 mg/dL      Creatinine 1.07 mg/dL      Sodium 147 (H) mmol/L      Potassium 3.7 mmol/L      Chloride 109 mmol/L      CO2 25.0 mmol/L      Calcium 8.9 mg/dL      eGFR Non African Amer 73 mL/min/1.73      BUN/Creatinine Ratio 19.6     Anion Gap 13.0 mmol/L     Narrative:       GFR Normal >60  Chronic Kidney Disease <60  Kidney Failure <15    Blood Gas, Arterial [[id number]]  (Abnormal) Collected:  07/11/17 0327    Specimen:  Arterial Blood Updated:  07/11/17 0330     Site Arterial: right radial     Gera's Test --      Documented in Rapid Comm        pH, Arterial 7.495 (H) pH units      pCO2, Arterial 31.1 (L) mm Hg      pO2, Arterial 76.1 (L) mm Hg      HCO3, Arterial 23.4 mmol/L      Base Excess, Arterial 1.1 mmol/L      O2 Saturation, Arterial 96.3 %      O2 Saturation Calculated 96.3 %      Barometric Pressure for Blood Gas -- mmHg       Component not reported at this site.        Modality Ventilator     FIO2 40 %      Ventilator Mode AC     Rate 10.0 Breaths/minute      PEEP 10.0     Vent CPAP/PEEP 10.0    Narrative:       Serial Number: [id number]    : [id number]    Blood Gas, Arterial [[id number]]  (Abnormal) Collected:  07/12/17 0310    Specimen:  Arterial Blood Updated:  07/12/17 0314     Site Arterial: right radial     Gera's Test --      Documented in Rapid Comm        pH, Arterial 7.498 (H) pH units      pCO2, Arterial 36.6 mm Hg      pO2, Arterial 76.8 (L) mm Hg      HCO3, Arterial 27.8 (H) mmol/L      Base Excess, Arterial 4.6 (H) mmol/L      O2 Saturation, Arterial 96.4 %      O2 Saturation Calculated 96.4 %      Barometric Pressure for Blood Gas -- mmHg       Component not reported  at this site.        Modality Ventilator     FIO2 40 %      Ventilator Mode AC     Rate 10.0 Breaths/minute      PEEP 10.0     Vent CPAP/PEEP 10.0    Narrative:       Serial Number: [id number]    : [id number]    Basic Metabolic Panel [[id number]]  (Abnormal) Collected:  07/12/17 0226    Specimen:  Blood Updated:  07/12/17 0334     Glucose 147 (H) mg/dL      BUN 29 (H) mg/dL      Creatinine 1.06 mg/dL      Sodium 147 (H) mmol/L      Potassium 3.3 (L) mmol/L      Chloride 106 mmol/L      CO2 28.0 mmol/L      Calcium 9.0 mg/dL      eGFR Non African Amer 74 mL/min/1.73      BUN/Creatinine Ratio 27.4 (H)     Anion Gap 13.0 mmol/L     Narrative:       GFR Normal >60  Chronic Kidney Disease <60  Kidney Failure <15    CBC & Differential [[id number]] Collected:  07/12/17 0226    Specimen:  Blood Updated:  07/12/17 0416    Narrative:       The following orders were created for panel order CBC & Differential.  Procedure                               Abnormality         Status                     ---------                               -----------         ------                     Manual Differential[[id number]]          Abnormal            Final result               Scan Slide[[id number]]                                       Final result               CBC Auto Differential[[id number]]        Abnormal            Final result                 Please view results for these tests on the individual orders.    CBC Auto Differential [[id number]]  (Abnormal) Collected:  07/12/17 0226    Specimen:  Blood Updated:  07/12/17 0416     WBC 16.64 (H) 10*3/mm3      RBC 3.49 (L) 10*6/mm3      Hemoglobin 10.4 (L) g/dL      Hematocrit 31.3 (L) %      MCV 89.7 fL      MCH 29.8 pg      MCHC 33.2 g/dL      RDW 14.7 %      RDW-SD 47.8 fl      MPV 9.6 fL      Platelets 349 10*3/mm3     Scan Slide [[id number]] Collected:  07/12/17 0226    Specimen:  Blood Updated:  07/12/17 0416     Scan Slide --      See Manual Differential Results       Manual  Differential [[id number]]  (Abnormal) Collected:  07/12/17 0226    Specimen:  Blood Updated:  07/12/17 0416     Neutrophil % 61.0 %      Lymphocyte % 23.0 %      Monocyte % 3.0 (L) %      Bands %  7.0 %      Metamyelocyte % 2.0 (H) %      Myelocyte % 3.0 (H) %      Atypical Lymphocyte % 1.0 %      Neutrophils Absolute 11.32 (H) 10*3/mm3      Lymphocytes Absolute 3.83 10*3/mm3      Monocytes Absolute 0.50 10*3/mm3      Hypochromia Slight/1+     WBC Morphology Normal     Platelet Morphology Normal    Blood Gas, Arterial [[id number]]  (Abnormal) Collected:  07/13/17 0317    Specimen:  Arterial Blood Updated:  07/13/17 0320     Site Arterial: right radial     Gera's Test --      Documented in Rapid Comm        pH, Arterial 7.441 pH units      pCO2, Arterial 38.8 mm Hg      pO2, Arterial 105.3 (H) mm Hg      HCO3, Arterial 25.8 mmol/L      Base Excess, Arterial 1.8 mmol/L      O2 Saturation, Arterial 98.0 %      O2 Saturation Calculated 98.0 %      Barometric Pressure for Blood Gas -- mmHg       Component not reported at this site.        Modality Ventilator     FIO2 40 %      Ventilator Mode AC     Rate 10.0 Breaths/minute      PEEP 10.0     Vent CPAP/PEEP 10.0    Narrative:       Serial Number: [id number]    : [id number]    Blood Gas, Arterial [[id number]]  (Abnormal) Collected:  07/14/17 0225    Specimen:  Arterial Blood Updated:  07/14/17 0229     Site Arterial: right radial     Gera's Test --      Documented in Rapid Comm        pH, Arterial 7.444 pH units      pCO2, Arterial 33.7 (L) mm Hg      pO2, Arterial 75.2 (L) mm Hg      HCO3, Arterial 22.6 mmol/L      Base Excess, Arterial -0.7 mmol/L      O2 Saturation, Arterial 95.7 %      O2 Saturation Calculated 95.7 %      Barometric Pressure for Blood Gas -- mmHg       Component not reported at this site.        Modality Ventilator     FIO2 40 %      Ventilator Mode Assist     Rate 10.0 Breaths/minute      PEEP 8.0     Vent CPAP/PEEP 8.0    Narrative:       Serial  Number: [id number]    : [id number]    CBC & Differential [[id number]] Collected:  07/14/17 0257    Specimen:  Blood Updated:  07/14/17 0317    Narrative:       The following orders were created for panel order CBC & Differential.  Procedure                               Abnormality         Status                     ---------                               -----------         ------                     CBC Auto Differential[[id number]]        Abnormal            Final result                 Please view results for these tests on the individual orders.    CBC Auto Differential [[id number]]  (Abnormal) Collected:  07/14/17 0257    Specimen:  Blood Updated:  07/14/17 0317     WBC 16.14 (H) 10*3/mm3      RBC 3.64 (L) 10*6/mm3      Hemoglobin 10.9 (L) g/dL      Hematocrit 32.8 (L) %      MCV 90.1 fL      MCH 29.9 pg      MCHC 33.2 g/dL      RDW 14.5 %      RDW-SD 47.7 fl      MPV 9.8 fL      Platelets 400 10*3/mm3      Neutrophil % 68.5 %      Lymphocyte % 18.7 %      Monocyte % 8.0 %      Eosinophil % 0.6 %      Basophil % 0.2 %      Immature Grans % 4.0 %      Neutrophils, Absolute 11.04 (H) 10*3/mm3      Lymphocytes, Absolute 3.02 10*3/mm3      Monocytes, Absolute 1.29 10*3/mm3      Eosinophils, Absolute 0.10 10*3/mm3      Basophils, Absolute 0.04 10*3/mm3      Immature Grans, Absolute 0.65 (H) 10*3/mm3     Basic Metabolic Panel [[id number]]  (Abnormal) Collected:  07/14/17 0257    Specimen:  Blood Updated:  07/14/17 0337     Glucose 105 (H) mg/dL      BUN 24 (H) mg/dL      Creatinine 0.99 mg/dL      Sodium 147 (H) mmol/L      Potassium 2.9 (C) mmol/L      Chloride 111 (H) mmol/L      CO2 24.0 mmol/L      Calcium 9.1 mg/dL      eGFR Non African Amer 80 mL/min/1.73      BUN/Creatinine Ratio 24.2     Anion Gap 12.0 mmol/L     Narrative:       GFR Normal >60  Chronic Kidney Disease <60  Kidney Failure <15    CBC (No Diff) [[id number]]  (Abnormal) Collected:  07/15/17 0143    Specimen:  Blood Updated:  07/15/17 0217     WBC  16.11 (H) 10*3/mm3      RBC 3.76 (L) 10*6/mm3      Hemoglobin 11.2 (L) g/dL      Hematocrit 33.8 (L) %      MCV 89.9 fL      MCH 29.8 pg      MCHC 33.1 g/dL      RDW 14.4 %      RDW-SD 47.0 fl      MPV 9.8 fL      Platelets 366 10*3/mm3     Blood Gas, Arterial [[id number]] Collected:  07/15/17 0217    Specimen:  Arterial Blood Updated:  07/15/17 0223     Site Arterial: right radial     Gera's Test --      Documented in Rapid Comm        pH, Arterial 7.435 pH units      pCO2, Arterial 35.4 mm Hg      pO2, Arterial 85.1 mm Hg      HCO3, Arterial 23.2 mmol/L      Base Excess, Arterial -0.4 mmol/L      O2 Saturation, Arterial 96.8 %      O2 Saturation Calculated 96.8 %      Barometric Pressure for Blood Gas -- mmHg       Component not reported at this site.        Modality Cool Aerosol     Flow Rate 35.00 lpm     Narrative:       Serial Number: [id number]    : [id number]    Basic Metabolic Panel [[id number]]  (Abnormal) Collected:  07/15/17 0143    Specimen:  Blood Updated:  07/15/17 0227     Glucose 93 mg/dL      BUN 18 mg/dL      Creatinine 0.90 mg/dL      Sodium 146 (H) mmol/L      Potassium 3.4 (L) mmol/L      Chloride 110 mmol/L      CO2 25.0 mmol/L      Calcium 8.8 mg/dL      eGFR Non African Amer 89 mL/min/1.73      BUN/Creatinine Ratio 20.0     Anion Gap 11.0 mmol/L     Narrative:       GFR Normal >60  Chronic Kidney Disease <60  Kidney Failure <15    Magnesium [[id number]]  (Normal) Collected:  07/15/17 0143    Specimen:  Blood Updated:  07/15/17 0227     Magnesium 2.1 mg/dL     CBC (No Diff) [[id number]]  (Abnormal) Collected:  07/16/17 0230    Specimen:  Blood Updated:  07/16/17 0253     WBC 19.16 (H) 10*3/mm3      RBC 3.84 (L) 10*6/mm3      Hemoglobin 11.3 (L) g/dL      Hematocrit 34.0 (L) %      MCV 88.5 fL      MCH 29.4 pg      MCHC 33.2 g/dL      RDW 14.4 %      RDW-SD 46.8 fl      MPV 9.8 fL      Platelets 357 10*3/mm3     Basic Metabolic Panel [[id number]]  (Abnormal) Collected:  07/16/17 0230    Specimen:   Blood Updated:  07/16/17 0305     Glucose 131 (H) mg/dL      BUN 18 mg/dL      Creatinine 0.95 mg/dL      Sodium 143 mmol/L      Potassium 3.6 mmol/L      Chloride 110 mmol/L      CO2 23.0 (L) mmol/L      Calcium 8.8 mg/dL      eGFR Non African Amer 84 mL/min/1.73      BUN/Creatinine Ratio 18.9     Anion Gap 10.0 mmol/L     Narrative:       GFR Normal >60  Chronic Kidney Disease <60  Kidney Failure <15    CBC (No Diff) [[id number]]  (Abnormal) Collected:  07/17/17 0343    Specimen:  Blood Updated:  07/17/17 0442     WBC 23.30 (H) 10*3/mm3      RBC 3.05 (L) 10*6/mm3      Hemoglobin 9.2 (L) g/dL      Hematocrit 27.4 (L) %      MCV 89.8 fL      MCH 30.2 pg      MCHC 33.6 g/dL      RDW 14.7 %      RDW-SD 48.4 fl      MPV 10.2 fL      Platelets 493 (H) 10*3/mm3     Basic Metabolic Panel [[id number]]  (Abnormal) Collected:  07/17/17 0343    Specimen:  Blood Updated:  07/17/17 0513     Glucose 86 mg/dL      BUN 17 mg/dL       Specimen hemolyzed. Results may be affected.        Creatinine 0.95 mg/dL      Sodium 142 mmol/L      Potassium 3.9 mmol/L       Specimen hemolyzed.  Results may be affected.        Chloride 109 mmol/L      CO2 22.0 (L) mmol/L      Calcium 9.3 mg/dL      eGFR Non African Amer 84 mL/min/1.73      BUN/Creatinine Ratio 17.9     Anion Gap 11.0 mmol/L     Narrative:       GFR Normal >60  Chronic Kidney Disease <60  Kidney Failure <15    CBC & Differential [[id number]] Collected:  07/18/17 0510    Specimen:  Blood Updated:  07/18/17 0551    Narrative:       The following orders were created for panel order CBC & Differential.  Procedure                               Abnormality         Status                     ---------                               -----------         ------                     CBC Auto Differential[[id number]]        Abnormal            Final result                 Please view results for these tests on the individual orders.    CBC Auto Differential [[id number]]  (Abnormal) Collected:   07/18/17 0510    Specimen:  Blood Updated:  07/18/17 0551     WBC 14.20 (H) 10*3/mm3      RBC 4.00 (L) 10*6/mm3      Hemoglobin 11.8 (L) g/dL      Hematocrit 36.0 (L) %      MCV 90.0 fL      MCH 29.5 pg      MCHC 32.8 (L) g/dL      RDW 14.4 %      RDW-SD 47.6 fl      MPV 10.4 fL      Platelets 413 (H) 10*3/mm3      Neutrophil % 73.2 %      Lymphocyte % 13.7 (L) %      Monocyte % 9.9 %      Eosinophil % 2.3 %      Basophil % 0.2 %      Immature Grans % 0.7 %      Neutrophils, Absolute 10.41 (H) 10*3/mm3      Lymphocytes, Absolute 1.94 10*3/mm3      Monocytes, Absolute 1.40 (H) 10*3/mm3      Eosinophils, Absolute 0.32 10*3/mm3      Basophils, Absolute 0.03 10*3/mm3      Immature Grans, Absolute 0.10 (H) 10*3/mm3     Basic Metabolic Panel [[id number]]  (Abnormal) Collected:  07/18/17 0510    Specimen:  Blood Updated:  07/18/17 0600     Glucose 85 mg/dL      BUN 22 (H) mg/dL      Creatinine 1.17 mg/dL      Sodium 144 mmol/L      Potassium 3.9 mmol/L      Chloride 108 mmol/L      CO2 22.0 (L) mmol/L      Calcium 9.7 mg/dL      eGFR Non African Amer 66 mL/min/1.73      BUN/Creatinine Ratio 18.8     Anion Gap 14.0 (H) mmol/L     Narrative:       GFR Normal >60  Chronic Kidney Disease <60  Kidney Failure <15    Uric Acid [[id number]]  (Normal) Collected:  07/18/17 0510    Specimen:  Blood Updated:  07/18/17 0751     Uric Acid 7.1 mg/dL     CBC & Differential [[id number]] Collected:  07/19/17 0550    Specimen:  Blood Updated:  07/19/17 0557    Narrative:       The following orders were created for panel order CBC & Differential.  Procedure                               Abnormality         Status                     ---------                               -----------         ------                     CBC Auto Differential[[id number]]        Abnormal            Final result                 Please view results for these tests on the individual orders.    CBC Auto Differential [[id number]]  (Abnormal) Collected:  07/19/17 0550     Specimen:  Blood Updated:  07/19/17 0557     WBC 8.50 10*3/mm3      RBC 3.55 (L) 10*6/mm3      Hemoglobin 10.3 (L) g/dL      Hematocrit 32.0 (L) %      MCV 90.1 fL      MCH 29.0 pg      MCHC 32.2 (L) g/dL      RDW 14.5 %      RDW-SD 47.8 fl      MPV 9.9 fL      Platelets 357 10*3/mm3      Neutrophil % 61.1 %      Lymphocyte % 19.8 %      Monocyte % 13.9 (H) %      Eosinophil % 4.0 %      Basophil % 0.4 %      Immature Grans % 0.8 %      Neutrophils, Absolute 5.20 10*3/mm3      Lymphocytes, Absolute 1.68 10*3/mm3      Monocytes, Absolute 1.18 10*3/mm3      Eosinophils, Absolute 0.34 10*3/mm3      Basophils, Absolute 0.03 10*3/mm3      Immature Grans, Absolute 0.07 (H) 10*3/mm3     Basic Metabolic Panel [[id number]]  (Abnormal) Collected:  07/19/17 0550    Specimen:  Blood Updated:  07/19/17 0618     Glucose 87 mg/dL      BUN 21 mg/dL      Creatinine 1.10 mg/dL      Sodium 144 mmol/L      Potassium 4.4 mmol/L      Chloride 112 (H) mmol/L      CO2 20.0 (L) mmol/L      Calcium 9.2 mg/dL      eGFR Non African Amer 71 mL/min/1.73      BUN/Creatinine Ratio 19.1     Anion Gap 12.0 mmol/L     Narrative:       GFR Normal >60  Chronic Kidney Disease <60  Kidney Failure <15        Hospital Course:  The patient is a 52 y.o. male who presented to [facility] with Complaints of a seizure and elevated blood pressure.  He also complained of his tongue have been swelling prior to the seizure activity and then additionally biting his tongue.  He was found to have posterior reversible encephalopathy syndrome on an MRI done in the emergency department.  He was admitted to the intensive care unit and neurology consulted as well.  We were initially very tactful about his blood pressure control.  We did not want him to strictly to because of his PRES and malignant hypertension.  Dr. Campos placed him on Keppra.  The patient on my first day with him, the day of admission, denied any significant alcohol intake.  He told me that he  had a few beers on occasion.  However, by hospital day #2 the patient was tremulous and displaying signs of alcohol withdrawal.  His daughter did endorse that he indeed did drink significantly more alcohol than what he had admitted to.  He was placed on scheduled Valium and as needed Ativan.  Unfortunately, he became increasingly combative and agitated.  He developed delirium tremens.  Dr. Campos and I felt that we had no choice but to place him on ventilatory support to get him through his withdrawal period.     His intensive care course was complicated by possible ventilator associated pneumonia, Clostridium difficile colitis, and he eventually required tracheostomy placement to wean from ventilatory support.  He was fortunately able to do this and ultimately rebounded very nicely after coming off of ventilatory support late in the evening of July 14th. He is now on room air and doing very well with his tracheostomy. ENT hopefully can consider decannulation in the near future.  They will see him as an outpatient setting.  He has been capping his trach and also using a Passy-Ceci valve.    His blood pressure is much improved over last 48-72 hours. Still a few hypertensive numbers, but overall doing much better. Changed metoprolol to carvedilol on 7/14. Placed on losartan on 7/14 and will increase today. There was some question as to whether or not he had tongue swelling on lisinopril. However, angiotensin receptor blockers should be okay as he needs these for blood pressure control. Continue amlodipine, Change clonidine patch back to oral at this point.       He will remain on Keppra for at least 6-8 weeks per the neurology service.  I will decrease his dose to 500 mg twice per day for now and continue.  He will follow-up with neurology in 3 weeks and have an outpatient MRI.  Also instructed he and his family that he is not to drive for 90 days post his seizure      He had been on broad-spectrum antibiotics for  "potential pneumonia since the 11th. The Staph in his sputum was MSSA. Discontinued vancomycin on 7/14. Discontinued Zosyn 7/16. Levaquin discontinued on 7/18, received 7 days. He is also completing Flagyl Clostridium difficile toxin present in his stool. Today is day 13, will treat for 14. No recent loose stools. His white blood cell count has finally normalized today. No signs of ongoing infections and no fevers.      Placed on indomethacin and colchicine on 7/17 for gout.  He has done very well with this and at this point in time can start taking them on an as-needed basis.  He is going to discuss with primary care next week about when to reinitiate allopurinol.  His uric acid was 7.1.  He knows to not take allopurinol in the acute phase of acute gout flare.      Speech therapy cleared for regular foods with thin liquids after a dysphagia study on 7/17.  He did well with physical and occupational therapy.  At this point in time, he is ambulatory and able to conduct all of his own activities of daily living.  For safety purposes, he is going to be staying with his mother for at least the first future.    Condition on Discharge: Good.     Physical Exam on Discharge:  /86  Pulse 96  Temp 97.2 °F (36.2 °C)  Resp 20  Ht 67\" (170.2 cm)  Wt 215 lb 8 oz (97.8 kg)  SpO2 99%  BMI 33.75 kg/m2  Physical Exam  Constitutional: No distress.   Seen and discussed with his nurse, Elin. His sister and mother are present. He is calm, polite, conversant. PICC has been removed.    Head: Normocephalic and atraumatic.   Eyes: Pupils are equal, round, and reactive to light.   Neck: Neck supple. No JVD present. Trach clean and with PMV.   Cardiovascular: Normal rate and regular rhythm.   Pulmonary/Chest: Effort normal and breath sounds normal. On room air via trach.    Abdominal: Soft. Bowel sounds are normal.   Musculoskeletal: He exhibits edema (trace).  Improved calor and erythema of the left great toe at the MTP joint. "   Neurological: He is appropriate. Good strength. Ambulatory.   Skin: Skin is warm and dry.     Discharge Disposition:  Home to his mother's house.     Discharge Medications:   Nishant Savage   Home Medication Instructions EMMETT:[id number]    Printed on:07/19/17 1018   Medication Information                      amLODIPine (NORVASC) 10 MG tablet  Take 1 tablet by mouth Daily.             carvedilol (COREG) 25 MG tablet  Take 1 tablet by mouth Every 12 (Twelve) Hours.             cloNIDine (CATAPRES) 0.1 MG tablet  Take 0.1 mg by mouth every night.             colchicine 0.6 MG tablet  Take 1 tablet by mouth Every 12 (Twelve) Hours As Needed for Muscle / Joint Pain.             fenofibrate (TRICOR) 145 MG tablet  Take 1 tablet by mouth Every Night.             HYDROcodone-acetaminophen (NORCO) 5-325 MG per tablet  Take 1 tablet by mouth Every 4 (Four) Hours As Needed for Moderate Pain  for up to 3 days.             indomethacin (INDOCIN) 50 MG capsule  Take 1 capsule by mouth 2 (Two) Times a Day As Needed for Mild Pain . Do not take for greater then 3-5 days.             levETIRAcetam (KEPPRA) 500 MG tablet  Take 1 tablet by mouth Every 12 (Twelve) Hours.             losartan (COZAAR) 100 MG tablet  Take 1 tablet by mouth Daily.             metroNIDAZOLE (FLAGYL) 500 MG tablet  Take 1 tablet by mouth Every 8 (Eight) Hours. Indications: Infection Within the Abdomen             vardenafil (LEVITRA) 10 MG tablet  Take 1 tablet by mouth Daily As Needed for erectile dysfunction.               Discharge Diet:   Diet Instructions     Diet: Regular; Thin Liquids, No Restrictions       Discharge Diet:  Regular   Fluid Consistency:  Thin Liquids, No Restrictions               Activity at Discharge:   Activity Instructions     Activity as Tolerated           Other Instructions (Specify)       No driving for 90 days.               Follow-up Appointments:   1.  EPI Cote in 1 week.  2.  Dr. Pierson to be determined.    3.  EPI Pardo in 3 weeks.    Test Results Pending at Discharge: None.    Jairon Perez DO  07/19/17  10:19 AM    Time: 40 minutes.            Electronically signed by Jairon Perez DO at 7/19/2017 10:35 AM

## 2017-07-20 NOTE — PLAN OF CARE
Problem: Inpatient Physical Therapy  Goal: Bed Mobility Goal LTG- PT  Outcome: Outcome(s) achieved Date Met:  07/20/17 07/20/17 0745   Bed Mobility PT LTG   Bed Mobility PT LTG, Date Goal Reviewed 07/20/17   Bed Mobility PT LTG, Outcome goal met       Goal: Strength Goal LTG- PT  Outcome: Outcome(s) achieved Date Met:  07/20/17 07/20/17 0745   Strength Goal PT LTG   Strength Goal PT LTG, Date Goal Reviewed 07/20/17   Strength Goal PT LTG, Outcome goal met       Goal: Physical Therapy Goal LTG- PT  Outcome: Outcome(s) achieved Date Met:  07/20/17 07/20/17 0745   Physical Therapy PT LTG   Physical Therapy PT LTG, Date Goal Reviewed 07/20/17   Physical Therapy PT LTG, Outcome goal met       Goal: Transfer Training Goal 1 LTG- PT  Outcome: Outcome(s) achieved Date Met:  07/20/17 07/20/17 0745   Transfer Training PT LTG   Transfer Training PT LTG, Date Goal Reviewed 07/20/17   Transfer Training PT LTG, Outcome goal met       Goal: Gait Training Goal LTG- PT  Outcome: Outcome(s) achieved Date Met:  07/20/17 07/20/17 0745   Gait Training PT LTG   Gait Training Goal PT LTG, Date Goal Reviewed 07/20/17   Gait Training Goal PT LTG, Outcome goal met       Goal: Stair Training Goal LTG- PT  Outcome: Outcome(s) achieved Date Met:  07/20/17 07/20/17 0745   Stair Training PT LTG   Stair Training Goal PT LTG, Date Goal Reviewed 07/20/17   Stair Training Goal PT LTG, Outcome goal met

## 2017-07-20 NOTE — THERAPY DISCHARGE NOTE
Acute Care - Physical Therapy Discharge Summary  [facility]       Patient Name: Nishant Savage  : 1965  MRN: [id number]    Today's Date: 2017  Onset of Illness/Injury or Date of Surgery Date: 17    Date of Referral to PT: 17  Referring Physician: Dr. Perez      Admit Date: 7/3/2017      PT Recommendation and Plan    Visit Dx:    ICD-10-CM ICD-9-CM   1. Oropharyngeal dysphagia R13.12 787.22   2. Seizures R56.9 780.39   3. Angioedema, sequela T78.3XXS 909.9   4. Alcohol withdrawal, with delirium F10.231 291.0   5. Impaired mobility Z74.09 799.89   6. Voice absence R49.1 784.41   7. 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    Turning from your back to your side while in flat bed without using bedrails? 4  -MF 4  -MF 3  -AE    Moving from lying on back to sitting on the side of a flat bed without bedrails? 4  -MF 4  -MF 3  -AE    Moving to and from a bed to a chair (including a wheelchair)? 4  -MF 4  -MF 3  -AE    Standing up from a chair using your arms (e.g., wheelchair, bedside chair)? 4  -MF 4  -MF 3  -AE    Climbing 3-5 steps with a railing? 3  -MF 3  -MF 2  -AE    To walk in hospital room? 3  -MF 3  -MF 3  -AE    AM-PAC 6 Clicks Score 22  -MF 22  -MF 17  -AE    Functional Assessment    Outcome Measure Options AM-PAC 6 Clicks Basic Mobility (PT)  - AM-PAC 6 Clicks Basic Mobility (PT)  -MF AM-PAC 6 Clicks Basic Mobility (PT)  -AE      User Key  (r) = Recorded By, (t) = Taken By, (c) = Cosigned By    Initials Name Provider Type    AE Flor Camp PTA Physical Therapy Assistant    MF Uma E. Restrepo, PTA Physical Therapy Assistant                      IP PT Goals       17 0745 17 1441 07/10/17 0810    Bed Mobility PT LTG    Bed Mobility PT LTG, Date  Established  07/16/17  -MARJORIE 07/10/17  -MARJORIE    Bed Mobility PT LTG, Time to Achieve  by discharge  -MARJORIE by discharge  -MARJORIE    Bed Mobility PT LTG, Activity Type  all bed mobility  -MARJORIE roll left/roll right  -MARJORIE    Bed Mobility PT LTG, [place] Level  independent  -MARJORIE moderate assist (50% patient effort)  -MARJORIE    Bed Mobility PT Goal  LTG, Assist Device  bed rails  -MARJORIE bed rails  -MARJORIE    Bed Mobility PT LTG, Date Goal Reviewed 07/20/17  -MF 07/16/17  -MARJORIE     Bed Mobility PT LTG, Outcome goal met  -MF goal revised  -MARJORIE     Bed Mobility PT LTG, Reason Goal Not Met  goals revised this date   d/c 7/10 goal, goal updated  -MARJORIE     Transfer Training PT LTG    Transfer Training PT LTG, Date Established  07/16/17  -MARJORIE     Transfer Training PT LTG, Time to Achieve  by discharge  -MARJORIE     Transfer Training PT LTG, Activity Type  bed to chair /chair to bed;sit to stand/stand to sit  -MARJORIE     Transfer Training PT LTG, [place] Level  conditional independence;independent  -MARJORIE     Transfer Training PT LTG, Assist Device  walker, rolling  -MARJORIE     Transfer Training PT  LTG, Date Goal Reviewed 07/20/17  -      Transfer Training PT LTG, Outcome goal met  -MF      Gait Training PT LTG    Gait Training Goal PT LTG, Date Established  07/16/17  -MARJORIE     Gait Training Goal PT LTG, Time to Achieve  by discharge  -MARJORIE     Gait Training Goal PT LTG, [place] Level  supervision required;conditional independence  -MARJORIE     Gait Training Goal PT LTG, Assist Device  walker, rolling  -MARJORIE     Gait Training Goal PT LTG, Distance to Achieve  200  -MARJORIE     Gait Training Goal PT LTG, Date Goal Reviewed 07/20/17  -MF      Gait Training Goal PT LTG, Outcome goal met  -MF      Stair Training PT LTG    Stair Training Goal PT LTG, Date Established  07/16/17  -MARJORIE     Stair Training Goal PT LTG, Time to Achieve  by discharge  -MARJORIE     Stair Training Goal PT LTG, Number of Steps  3  -MARJORIE     Stair Training Goal PT LTG, [place] Level  contact guard assist  -MARJORIE      Stair Training Goal PT LTG, Distance to Achieve  Attempt stairs if pt. to d/c home  -MARJORIE     Stair Training Goal PT LTG, Date Goal Reviewed 07/20/17  -      Stair Training Goal PT LTG, Outcome goal met  -      Strength Goal PT LTG    Strength Goal PT LTG, Date Established   07/10/17  -MARJORIE    Strength Goal PT LTG, Time to Achieve   by discharge  -MARJORIE    Strength Goal PT LTG, Measure to Achieve   AAROM/AROM, B UE/LE, 10-20 reps, min assist  -MARJORIE    Strength Goal PT LTG, Date Goal Reviewed 07/20/17  - 07/16/17  -MARJORIE     Strength Goal PT LTG, Outcome goal met  - goal partially met  -MARJORIE     Strength Goal PT LTG, Reason Goal Not Met  progress slower than expected  -MARJORIE     Physical Therapy PT LTG    Physical Therapy PT LTG, Date Established   07/10/17  -MARJORIE    Physical Therapy PT LTG, Time to Achieve   by discharge  -MARJORIE    Physical Therapy PT LTG, Activity Type   No new evidence of skin breakdown or contractures while pt. on the vent.   -MARJORIE    Physical Therapy PT LTG, Date Goal Reviewed 07/20/17  - 07/16/17  -MARJORIE     Physical Therapy PT LTG, Outcome goal met  - goal met  -MARJORIE     Physical Therapy PT LTG, Reason Goal Not Met  goals revised this date   d/c goal  -MARJORIE       User Key  (r) = Recorded By, (t) = Taken By, (c) = Cosigned By    Initials Name Provider Type    MARJORIE Olsen, PT DPT Physical Therapist    MULU Restrepo, PTA Physical Therapy Assistant              PT Discharge Summary  Anticipated Discharge Disposition: home with assist  Reason for Discharge: Discharge from facility  Outcomes Achieved: Able to achieve all goals within established timeline  Discharge Destination: Home with assist      Uma Restrepo, NATALIE   7/20/2017

## 2017-07-21 DIAGNOSIS — I10 ESSENTIAL HYPERTENSION: Primary | ICD-10-CM

## 2017-07-21 RX ORDER — CLONIDINE HYDROCHLORIDE 0.1 MG/1
0.1 TABLET ORAL NIGHTLY
Qty: 60 TABLET | Refills: 2 | Status: SHIPPED | OUTPATIENT
Start: 2017-07-21 | End: 2017-10-11

## 2017-07-21 NOTE — TELEPHONE ENCOUNTER
[facility] home health nurse called requesting refill for Clonidine. Patient BP elevated.   The patient actually came

## 2017-07-26 ENCOUNTER — OFFICE VISIT (OUTPATIENT)
Dept: FAMILY MEDICINE CLINIC | Facility: CLINIC | Age: 52
End: 2017-07-26

## 2017-07-26 VITALS
DIASTOLIC BLOOD PRESSURE: 82 MMHG | WEIGHT: 201 LBS | BODY MASS INDEX: 31.55 KG/M2 | HEIGHT: 67 IN | SYSTOLIC BLOOD PRESSURE: 122 MMHG | HEART RATE: 85 BPM | OXYGEN SATURATION: 99 % | TEMPERATURE: 98.1 F | RESPIRATION RATE: 16 BRPM

## 2017-07-26 DIAGNOSIS — Z91.199 NON-COMPLIANT PATIENT: ICD-10-CM

## 2017-07-26 DIAGNOSIS — F10.10 ALCOHOL ABUSE: ICD-10-CM

## 2017-07-26 DIAGNOSIS — A49.01 STAPH AUREUS INFECTION: ICD-10-CM

## 2017-07-26 DIAGNOSIS — Z98.890 HX OF TRACHEOSTOMY: ICD-10-CM

## 2017-07-26 DIAGNOSIS — Z71.6 ENCOUNTER FOR SMOKING CESSATION COUNSELING: ICD-10-CM

## 2017-07-26 DIAGNOSIS — M10.9 ACUTE GOUT INVOLVING TOE OF LEFT FOOT, UNSPECIFIED CAUSE: ICD-10-CM

## 2017-07-26 DIAGNOSIS — A49.01 MSSA (METHICILLIN SUSCEPTIBLE STAPHYLOCOCCUS AUREUS) INFECTION: ICD-10-CM

## 2017-07-26 DIAGNOSIS — M47.892 OTHER OSTEOARTHRITIS OF SPINE, CERVICAL REGION: ICD-10-CM

## 2017-07-26 DIAGNOSIS — G47.33 OBSTRUCTIVE SLEEP APNEA: ICD-10-CM

## 2017-07-26 DIAGNOSIS — Z72.0 TOBACCO ABUSE: ICD-10-CM

## 2017-07-26 DIAGNOSIS — Z09 HOSPITAL DISCHARGE FOLLOW-UP: Primary | ICD-10-CM

## 2017-07-26 DIAGNOSIS — N52.9 ERECTILE DYSFUNCTION, UNSPECIFIED ERECTILE DYSFUNCTION TYPE: ICD-10-CM

## 2017-07-26 PROCEDURE — 99214 OFFICE O/P EST MOD 30 MIN: CPT | Performed by: NURSE PRACTITIONER

## 2017-07-26 RX ORDER — VARDENAFIL HYDROCHLORIDE 10 MG/1
10 TABLET ORAL DAILY PRN
Qty: 10 TABLET | Refills: 5 | Status: SHIPPED | OUTPATIENT
Start: 2017-07-26 | End: 2018-05-16 | Stop reason: SDUPTHER

## 2017-07-26 RX ORDER — COLCHICINE 0.6 MG/1
TABLET ORAL
Qty: 14 TABLET | Refills: 3 | Status: SHIPPED | OUTPATIENT
Start: 2017-07-26 | End: 2017-09-12 | Stop reason: SDUPTHER

## 2017-07-26 RX ORDER — HYDROCODONE BITARTRATE AND ACETAMINOPHEN 10; 325 MG/1; MG/1
1 TABLET ORAL EVERY 6 HOURS PRN
Qty: 9 TABLET | Refills: 0 | Status: SHIPPED | OUTPATIENT
Start: 2017-07-26 | End: 2017-08-23

## 2017-07-26 NOTE — PROGRESS NOTES
Transitional Care Follow Up Visit  Subjective     Nishant Savage is a 52 y.o. male who presents for a transitional care management visit.    Within 48 business hours after discharge our office contacted him via telephone to coordinate his care and needs.      I reviewed and discussed the details of that call along with the discharge summary, hospital problems, inpatient lab results, inpatient diagnostic studies, and consultation reports with Nishant.    Date of TCM Phone Call 7/20/2017   [facility]   Date of Admission 7/3/2017   Date of Discharge 7/19/2017   Discharge Disposition Home or Self Care       History of Present Illness   Nishant presented to [facility] 7/3/17 for complaints of a seizure and elevated BP.  He complained of tongue swelling and biting his tongue. He had posterioir reversible encephalopathy syndrome on MRI.  He was admitted to ICU and neuro was consulted.  He was placed on Keppra.  He denied any significant alcohol intake however on day 2 he displaying  Signs of alcohol withdrawal.  Family however, endorsed he did drink significantly more alcohol and was experiencing signs of alcohol withdrawal.  He was give valium and ativan as needed. He became increasing live combative and agitated and had delirium tremens.   He was put on ventilatory  Support to get him through his withdrawal period. Health was complicated by possible ventilator associated pneumonia, Cdiff and eventually required tracheostomy placement to wean from ventilatory support.  He rebounded very nicely.  His pressure improved within 48-72 hours, and was capping his trach and using the Passy-Ceci valve.  Ent with consider decannulation in the near future. His BP meds were adjusted and he was to continue amlodipine, clonidine to oral, continue Keppra for 6-8 weeks, put on broad spectrum antibiotics for potential pneumonia, Staph and MSSA in suptum. He was placed on indomethacin and colchicine for gout.  He did well with  speech therapy and occupational therapy.  He was discharged and instructed to follow up with PCP.      Discharge diagnosis:  Possible angioedema related to lisinopril prior to presentation and tongue bite  Malignant hypertension at presentation and still difficult to control  Possible new onset seizure disorder versus convulsive syncope  Posterior reversible encephalopathy syndrome on MRI  Tongue bite with sublingual hematoma, much improved  Tobacco abuse  Obstructive sleeep apnea, mom comppliant with device  Daily alcohol use with recent delirium tremens requiring sedation and mechanical ventilation  Hypokalemia, replaced  Clostridium difficile colitis  Possible lingual or sublingual infection, MSSA on culture  Bilateral lower lob infiltrates consistent with ventilator associated pneumonia compbined with atelectasis  Acute gouty arthritis of left great toe    Consults:  Dr. Campos and Dr Arroyo with neurology  Dr. Gallo and Dr. Petersen with pulmonary  Dr. Pierson with ENT  Dr. Minna Tran with general surgery.      I have reviewed all tests and imaging during hospitalization  Xray Right Shoulder:  Bony narrowing of the outlet based on the clavicle. High riding humeral head.  MRI correlation recommended to assess the rotator cuff integrity    CT head W/O contrast:  Bilateral occipital lobe hypodensity compatible with subacute ischemic change    MRI Brain with and without contrast:  Posterior reversible encephalopathy syndrome, follow up in 8 weeks    CXR: Apparent appropriate endotracheal tube position, left basilar atelectasis.    KUB: satisfactory placement of ng tube. Nonspecific bowel gas pattern with gas in both small bowel and colon.  There is some asymmetric distention of what appears to be loop of the sigmoid colon with a sigmoid volvulus a differential  Although considered unlikely given the lack of distention of the more proximal bowel and the pts age.      Venous doppler upper extremity left:  Thrombus is  visualized in the left basilic vein above the elbow    Arterial doppler upper extremity Left: Negative left upper extremity arterial duplex US    CT head with and without contrast:  Negative CT brain without contrast    CT soft tissue neck with and without contrast:  No acute abnormalities identified on the enhanced CT soft tissue the neck.  Nasogastric tube and endotracheal tube present.  Artifact from the dental work is noted.  Fluid in the right maxillary antrum is present.  Degenerative spondylosis in cervical spine.     CT abdomen Pelvis without contrast: No evidence of bowel obstruction or sigmoid volvulus.  Three is mod sigmoid diverticulosis without acute diverticulitis.  Nonspecific mild fat stranding/inflammation within the retroperitoneum and extraperitoneal fat planes in the pelvis without evidence of an abscess.  Extensive infiltrates in the lower lobes with air bronchograms suspicious for acute lateral lower lobe pneumonia probably combined with atelectasis    Renal artery Complete:  Normal morphological appearance of both kidneys.  Limited study with obscuration of the renal artery origins as well as the mid left renal artery, no dopple evidience for significant renal artery stenosis is seen. However, if more detailed evaluation is indicated clinically, CTA would be the best study.      US Duplex of the lower extremities:  Normal duplex US of bilateral lower extremities without evidence of DVT.      FL Video Swallow with speech:  Negative dysphagia study    Labs: all reviewed:  CBC with diff, CMP, UA with culture, ua microscopic only, Urine drug screen, BMP, Magnesium, CBC, Arterial blood gas, POC glucose fingerstick  GI panel, PCR. Cdiff, respiratory culture, rowdy tests    All medications were reviewed pt is to continue as prescribed    Has follow up with Chichi ruiz 3 weeks, has appt with Dr. Pierson to be determined               The following portions of the patient's history were reviewed and  updated as appropriate: allergies, current medications, past family history, past medical history, past social history, past surgical history and problem list.    Review of Systems   Constitutional: Positive for activity change, appetite change and fatigue.   HENT: Negative.    Respiratory: Negative for apnea, cough, chest tightness and shortness of breath.    Cardiovascular: Negative for chest pain, palpitations and leg swelling.   Musculoskeletal: Negative.    Hematological: Negative for adenopathy.   Psychiatric/Behavioral: Negative for agitation and behavioral problems. The patient is not nervous/anxious.    All other systems reviewed and are negative.      Objective   Physical Exam   Constitutional: He is oriented to person, place, and time. Vital signs are normal. He appears well-developed and well-nourished. He is cooperative.   HENT:   Head: Normocephalic and atraumatic.       Right Ear: Hearing, tympanic membrane, external ear and ear canal normal.   Left Ear: Hearing, tympanic membrane, external ear and ear canal normal.   Nose: Nose normal.   Mouth/Throat: Uvula is midline and oropharynx is clear and moist.   Eyes: Conjunctivae and EOM are normal. Pupils are equal, round, and reactive to light.   Neck: Normal range of motion. Neck supple.   Cardiovascular: Normal rate, regular rhythm, normal heart sounds and intact distal pulses.    Pulmonary/Chest: Effort normal and breath sounds normal.   Abdominal: Soft. Normal appearance and bowel sounds are normal.   Musculoskeletal: Normal range of motion.      During the foot exam he had a monofilament test not performed.     Neurological: He is alert and oriented to person, place, and time. He has normal strength. No cranial nerve deficit or sensory deficit. He displays a negative Romberg sign.   Skin: Skin is warm, dry and intact.   Psychiatric: He has a normal mood and affect. His speech is normal and behavior is normal. Judgment and thought content normal.  Cognition and memory are normal.   Nursing note and vitals reviewed.      Assessment/Plan   Nishant was seen today for follow-up.    Diagnoses and all orders for this visit:    Hospital discharge follow-up       -    Continue all medications as prescribed       -    Keep all follow up appts as scheduled       -    Continue living with mother until able to live on own    Non-compliant patient      -  Discussed recent hospitalization and the need to be compliant with all meds and appts    Obstructive sleep apnea        -does not wear appliance says he doesn't like it    Staph aureus infection  MSSA (methicillin susceptible Staphylococcus aureus) infection      - continue all meds as prescribed    Hx of tracheostomy       -   Follow up with Dr. Pierson for removal    Acute gout involving toe of left foot, unspecified cause  -     HYDROcodone-acetaminophen (NORCO)  MG per tablet; Take 1 tablet by mouth Every 6 (Six) Hours As Needed for Moderate Pain (4-6) or Severe Pain  (7-10) (Gout).  -     colchicine 0.6 MG tablet; Take 2 pills now, 1 pill in one hr.  Then Can take daily        -     KVNG query complete. Treatment plan to include limited course of prescribed controlled substance. Risks including addiction, benefits, and alternatives presented to patient.         -     The patient has read and signed the [facility] Controlled Substance Contract.  I will continue to see patient for regular follow up appointments.  They are well controlled on their                medication.  KVNG is updated every 3 months. The patient is aware of the potential for addiction and dependence.   The patient has read and signed the [facility] Controlled              Substance Contract.  I will continue to see patient for regular follow up appointments.  They are well controlled on their medication.  VKNG is updated every 3 months. The patient is              aware of the potential for addiction. The patient has read and  signed the [facility] Controlled Substance Contract.  I will continue to see patient for regular follow up appointments.               They are well controlled on their medication.  KVNG is updated every 3 months. The patient is aware of the potential for addiction and dependence.on and dependence.    Other osteoarthritis of spine, cervical region    Erectile dysfunction, unspecified erectile dysfunction type  -     vardenafil (LEVITRA) 10 MG tablet; Take 1 tablet by mouth Daily As Needed for erectile dysfunction.    Alcohol abuse       -   Refrain from alcohol use, if starts drinking again, recommend AA    Tobacco abuse  Encounter for smoking cessation counseling  Tobacco abuse: Smoking Cessation discussed today for  >3 minutes.  I advised the patient regarding the risks of continued tobacco use.  We discussed methods in which to succeed at smoking cessation.  Handouts were offered to the patient regarding tobacco cessation. The patient has expressed a willingness to quit.  We discussed benefits/risks of oral medications to include Chantix/Wellbutrin. Discussed benefits and risks to nicotine patches, gum. Discussed no benefit and no recommendation at this time to switch to E. Cigarettes as health hazards are not yet known.  We discussed lifestyle changes, discussed BH cessation class and handout offered to patient today.  Discussed to consider ration technique to quit with time (Each day set out the number of cigarettes that you allow yourself to smoke.  Each day decrease this number by 1 cigarrette until you get to a point that you feel you need another cigarette.  You can hold at that level x 1 week.  Continue to decrease until you either are tobacco free or until you cannot decline any further). When you cannot decrease this any further you can return and we can discussed medication options again.  Initial goal with reduction technique is 25% in 3 months.    1.   Smoking cessation  a. Set a quit  date  b. We discussed the benefits/risks of oral medication s to include Chantix and               Wellbutrin, nicoderm gum/patches   c. Chew gum, candy, when has desire to smoke  d. Get a straw to hold in your hand and keep your hands busy  E.  Each day decrease the number of cigarettes by 1 cigarette until you get to a               point that you feel you need another cigarette.  You can hold at that level x 1               week.  Continue to decreased until you either are tobacco free or until ou cannot               decline any further.  When you cannot decrease this any further you can return                and we can discuss medication options again.  Initial goal with reduction                technique Is 25% in 3 months.    f. Risks, benefits, side effects, (depression, suicidal thoughts, behavior change,               agitation, hostility  g. Develop behavior interventions to cope with temptations     2 Develop a quit contract  a. Pick a date that you plan to quit  b.  Write down all the reasons your want to quit (children, , healthy, money)  c. Make a table and one side why you smoke, the other side why you need to quit  d. Don’t buy cartons, buy one pack at a time so cigarettes are not available  e. Write down ways that you plan to stop the cravings (chew gum, eat candy, drink              a glass of water, kiss your child, take a walk, read a book, throw a ball to the               dog, deep breathing exercises)  f. Reduce stress in life  g. Sign contract and have a supportive witness to sign and post on refrigerator  h. Throw away all smoking paraphernalia - left over cigarettes,, matches, lighters,               ashtrays, cigarette lighter in car  i. Earmark the money you are spending on cigarettes and put money spent into a               bank (Make a plan to reward yourself when you quit)  j. Avoid too much caffeine, it will cause jitters when trying to quit.  k. In place of smoking cigarettes  try sunflower seeds, sugar free lollipops, gum,                   carrot or celery sticks  l. Try a cup of herbal tea when you have craving to smoke, it will relax you  m. Instead of a smoking break at work play a game of SigFig  n. Create a smoke free zone, don’t allow anyone to smoke in your home, car, or               sitting next to you in a restaurant  o. Make actual no smoking signs and post in your house  p. Make an index card and any time you are tempted to light up, take it out and               read it---Smoking increases the risk of lung, bladder, pancreatic, mouth,               esophageal and other cancers.  Smoking increases the risk of heart disease,                stroke, high blood pressure. Smoking reduces levels of folate, low levels can               increase the risk for depression, alzheimers disease and risk of heart disease.                Smoking affects mental capacity and memory. Smoking contributes               to thin bones. Smoking increases likelihood of impotence. Smoking affects the               ability to smell and taste. Smoking results in low birthrate babies  q. Sit in a different chair at breakfast or take a different route to work  r. Don’t bottle up your emotions.  If something makes you angry, express it instead               of smothering with cigarette smoke  s. Search the internet on ways to quit smoking.   t. Report any side effects of smoking cessation medications to your health care               Provider  U.         Http://smokefree.gov/smokefreetxt/ and www.heart.org      Return in about 2 weeks (around 8/9/2017).    Linda Hoffman, SUSHMA, APRN-BC  07/26/2017

## 2017-07-31 ENCOUNTER — TELEPHONE (OUTPATIENT)
Dept: FAMILY MEDICINE CLINIC | Facility: CLINIC | Age: 52
End: 2017-07-31

## 2017-07-31 PROBLEM — A49.01 MSSA (METHICILLIN SUSCEPTIBLE STAPHYLOCOCCUS AUREUS) INFECTION: Status: ACTIVE | Noted: 2017-07-31

## 2017-07-31 NOTE — PATIENT INSTRUCTIONS
Tobacco Use Disorder  Tobacco use disorder (TUD) is a mental disorder. It is the long-term use of tobacco in spite of related health problems or difficulty with normal life activities. Tobacco is most commonly smoked as cigarettes and less commonly as cigars or pipes. Smokeless chewing tobacco and snuff are also popular. People with TUD get a feeling of extreme pleasure (euphoria) from using tobacco and have a desire to use it again and again. Repeated use of tobacco can cause problems.  The addictive effects of tobacco are due mainly to the ingredient nicotine. Nicotine also causes a rush of adrenaline (epinephrine) in the body. This leads to increased blood pressure, heart rate, and breathing rate. These changes may cause problems for people with high blood pressure, weak hearts, or lung disease. High doses of nicotine in children and pets can lead to seizures and death.   Tobacco contains a number of other unsafe chemicals. These chemicals are especially harmful when inhaled as smoke and can damage almost every organ in the body. Smokers live shorter lives than nonsmokers and are at risk of dying from a number of diseases and cancers. Tobacco smoke can also cause health problems for nonsmokers (due to inhaling secondhand smoke). Smoking is also a fire hazard.   TUD usually starts in the late teenage years and is most common in young adults between the ages of 18 and 25 years. People who start smoking earlier in life are more likely to continue smoking as adults. TUD is somewhat more common in men than women. People with TUD are at higher risk for using alcohol and other drugs of abuse.  RISK FACTORS  Risk factors for TUD include:   · Having family members with the disorder.  · Being around people who use tobacco.  · Having an existing mental health issue such as schizophrenia, depression, bipolar disorder, ADHD, or posttraumatic stress disorder (PTSD).  SIGNS AND SYMPTOMS   People with tobacco use disorder have  two or more of the following signs and symptoms within 12 months:   · Use of more tobacco over a longer period than intended.    · Not able to cut down or control tobacco use.    · A lot of time spent obtaining or using tobacco.    · Strong desire or urge to use tobacco (craving). Cravings may last for 6 months or longer after quitting.  · Use of tobacco even when use leads to major problems at work, school, or home.    · Use of tobacco even when use leads to relationship problems.    · Giving up or cutting down on important life activities because of tobacco use.    · Repeatedly using tobacco in situations where it puts you or others in physical danger, like smoking in bed.    · Use of tobacco even when it is known that a physical or mental problem is likely related to tobacco use.      Physical problems are numerous and may include chronic bronchitis, emphysema, lung and other cancers, gum disease, high blood pressure, heart disease, and stroke.      Mental problems caused by tobacco may include difficulty sleeping and anxiety.  · Need to use greater amounts of tobacco to get the same effect. This means you have developed a tolerance.    · Withdrawal symptoms as a result of stopping or rapidly cutting back use. These symptoms may last a month or more after quitting and include the following:      Depressed, anxious, or irritable mood.      Difficulty concentrating.        Increased appetite.    Restlessness or trouble sleeping.      Use of tobacco to avoid withdrawal symptoms.  DIAGNOSIS   Tobacco use disorder is diagnosed by your health care provider. A diagnosis may be made by:  · Your health care provider asking questions about your tobacco use and any problems it may be causing.  · A physical exam.  · Lab tests.  · You may be referred to a mental health professional or addiction specialist.  The severity of tobacco use disorder depends on the number of signs and symptoms you have:   · Mild--Two or three  symptoms.  · Moderate--Four or five symptoms.    · Severe--Six or more symptoms.    TREATMENT   Many people with tobacco use disorder are unable to quit on their own and need help. Treatment options include the following:  · Nicotine replacement therapy (NRT). NRT provides nicotine without the other harmful chemicals in tobacco. NRT gradually lowers the dosage of nicotine in the body and reduces withdrawal symptoms. NRT is available in over-the-counter forms (gum, lozenges, and skin patches) as well as prescription forms (mouth inhaler and nasal spray).  · Medicines. This may include:    Antidepressant medicine that may reduce nicotine cravings.    A medicine that acts on nicotine receptors in the brain to reduce cravings and withdrawal symptoms. It may also block the effects of tobacco in people with TUD who relapse.  · Counseling or talk therapy. A form of talk therapy called behavioral therapy is commonly used to treat people with TUD. Behavioral therapy looks at triggers for tobacco use, how to avoid them, and how to cope with cravings. It is most effective in person or by phone but is also available in self-help forms (books and Internet websites).  · Support groups. These provide emotional support, advice, and guidance for quitting tobacco.  The most effective treatment for TUD is usually a combination of medicine, talk therapy, and support groups.  HOME CARE INSTRUCTIONS  · Keep all follow-up visits as directed by your health care provider. This is important.  · Take medicines only as directed by your health care provider.  · Check with your health care provider before starting new prescription or over-the-counter medicines.  SEEK MEDICAL CARE IF:  · You are not able to take your medicines as prescribed.  · Treatment is not helping your TUD and your symptoms get worse.  SEEK IMMEDIATE MEDICAL CARE IF:  · You have serious thoughts about hurting yourself or others.  · You have trouble breathing, chest pain,  sudden weakness, or sudden numbness in part of your body.     This information is not intended to replace advice given to you by your health care provider. Make sure you discuss any questions you have with your health care provider.     Document Released: 08/23/2005 Document Revised: 04/10/2017 Document Reviewed: 02/13/2015  [employer] Interactive Patient Education ©2017 [employer]

## 2017-08-09 ENCOUNTER — OFFICE VISIT (OUTPATIENT)
Dept: OTOLARYNGOLOGY | Facility: CLINIC | Age: 52
End: 2017-08-09

## 2017-08-09 VITALS
HEART RATE: 80 BPM | WEIGHT: 205 LBS | SYSTOLIC BLOOD PRESSURE: 121 MMHG | RESPIRATION RATE: 20 BRPM | TEMPERATURE: 98 F | BODY MASS INDEX: 32.18 KG/M2 | DIASTOLIC BLOOD PRESSURE: 78 MMHG | HEIGHT: 67 IN

## 2017-08-09 DIAGNOSIS — T78.3XXD ANGIO-EDEMA, SUBSEQUENT ENCOUNTER: Primary | ICD-10-CM

## 2017-08-09 DIAGNOSIS — Z93.0 STATUS POST TRACHEOSTOMY (HCC): ICD-10-CM

## 2017-08-09 PROCEDURE — 99213 OFFICE O/P EST LOW 20 MIN: CPT | Performed by: OTOLARYNGOLOGY

## 2017-08-09 NOTE — PROGRESS NOTES
PRIMARY CARE PROVIDER: Linda Hoffman, DNP, APRN  REFERRING PROVIDER: No ref. provider found    Chief Complaint   Patient presents with   • Follow-up     HOSPITAL TRACH F/U       Subjective   History of Present Illness:  Nishant Savage is a  52 y.o. male who is here for follow up. He has had a tracheostomy that he feels is ready for decannulation. He is s/p tracheostomy on 7/12/17 due to angioedema. He does report some numbness to his tongue, but denies any recurrent tongue swelling or angioedema. He denies any dysphagia, SOB, stridor, or stertor. He states he continues to breath well with his trach capped. His voice is strong.     Review of Systems:  Review of Systems   Constitutional: Negative for chills and fever.   HENT: Negative for sore throat, trouble swallowing and voice change.    Respiratory: Negative for choking, shortness of breath and stridor.    All other systems reviewed and are negative.      Past History:  Past Medical History:   Diagnosis Date   • Angio-edema 7/3/2017   • Anxiety    • Arthritis    • Clostridium difficile colitis    • Erectile dysfunction 10/6/2016   • Gout    • HCAP (healthcare-associated pneumonia) 7/11/2017   • Hyperlipidemia    • Malignant hypertension    • MSSA (methicillin susceptible Staphylococcus aureus) infection 7/31/2017   • Obstructive sleep apnea    • Seizures 7/4/2017     Past Surgical History:   Procedure Laterality Date   • TRACHEOSTOMY N/A 7/12/2017    Procedure: TRACHEOSTOMY WITH TRACHEOSCOPY;  Surgeon: Jairon Pierson MD;  Location: [facility];  Service:      Family History   Problem Relation Age of Onset   • Hypertension Mother    • Diabetes Mother    • Heart disease Father    • Hypertension Father    • No Known Problems Daughter    • No Known Problems Son    • Diabetes Maternal Grandmother    • No Known Problems Maternal Grandfather    • No Known Problems Paternal Grandmother    • Heart attack Paternal Grandfather    • Kidney disease Sister      Social  History   Substance Use Topics   • Smoking status: Former Smoker     Packs/day: 0.33     Years: 30.00     Types: Cigarettes     Quit date: 7/3/2017   • Smokeless tobacco: None   • Alcohol use No      Comment: PER DAY FOR 30 YEARS; Last drink July 3, 2017     Allergies:  Lipitor [atorvastatin] and Lisinopril    Current Outpatient Prescriptions:   •  amLODIPine (NORVASC) 10 MG tablet, Take 1 tablet by mouth Daily., Disp: 30 tablet, Rfl: 1  •  carvedilol (COREG) 25 MG tablet, Take 1 tablet by mouth Every 12 (Twelve) Hours., Disp: 60 tablet, Rfl: 1  •  CloNIDine (CATAPRES) 0.1 MG tablet, Take 1 tablet by mouth Every Night., Disp: 60 tablet, Rfl: 2  •  colchicine 0.6 MG tablet, Take 2 pills now, 1 pill in one hr.  Then Can take daily, Disp: 14 tablet, Rfl: 3  •  fenofibrate (TRICOR) 145 MG tablet, Take 1 tablet by mouth Every Night., Disp: 90 tablet, Rfl: 1  •  HYDROcodone-acetaminophen (NORCO)  MG per tablet, Take 1 tablet by mouth Every 6 (Six) Hours As Needed for Moderate Pain (4-6) or Severe Pain  (7-10) (Gout)., Disp: 9 tablet, Rfl: 0  •  indomethacin (INDOCIN) 50 MG capsule, Take 1 capsule by mouth 2 (Two) Times a Day As Needed for Mild Pain . Do not take for greater then 3-5 days., Disp: 14 capsule, Rfl: 1  •  levETIRAcetam (KEPPRA) 500 MG tablet, Take 1 tablet by mouth Every 12 (Twelve) Hours., Disp: 60 tablet, Rfl: 1  •  losartan (COZAAR) 100 MG tablet, Take 1 tablet by mouth Daily., Disp: 30 tablet, Rfl: 1  •  metroNIDAZOLE (FLAGYL) 500 MG tablet, Take 1 tablet by mouth Every 8 (Eight) Hours. Indications: Infection Within the Abdomen, Disp: 6 tablet, Rfl: 0  •  vardenafil (LEVITRA) 10 MG tablet, Take 1 tablet by mouth Daily As Needed for erectile dysfunction., Disp: 10 tablet, Rfl: 5      Objective     Vital Signs:  Temp:  [98 °F (36.7 °C)] 98 °F (36.7 °C)  Heart Rate:  [80] 80  Resp:  [20] 20  BP: (121)/(78) 121/78    Physical Exam:  Physical Exam   Constitutional: He is oriented to person, place, and  time. He appears well-developed and well-nourished. He is cooperative. No distress.   HENT:   Head: Normocephalic and atraumatic.   Right Ear: External ear normal.   Left Ear: External ear normal.   Nose: Nose normal. No nasal deformity.   Mouth/Throat: Uvula is midline, oropharynx is clear and moist and mucous membranes are normal.   Eyes: Conjunctivae, EOM and lids are normal.   Neck: Phonation normal. Neck supple.   Tracheostomy tube in place- decannulated and occlusive dressing placed. Stoma is healthy.   Mirror exam normal- bilateral vocal cord with normal mobility   Pulmonary/Chest: Effort normal. No stridor. No respiratory distress.   Lymphadenopathy:        Head (right side): No submental, no submandibular, no tonsillar, no preauricular, no posterior auricular and no occipital adenopathy present.        Head (left side): No submental, no submandibular, no tonsillar, no preauricular, no posterior auricular and no occipital adenopathy present.     He has no cervical adenopathy.   Neurological: He is alert and oriented to person, place, and time. No cranial nerve deficit.   Psychiatric: He has a normal mood and affect. His behavior is normal. Judgment and thought content normal.     Assessment   Assessment:  1. Angio-edema, subsequent encounter    2. Status post tracheostomy        Plan   Plan:    He is doing well. The tracheostomy tube was removed.  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  Family was instructed on daily dressing changes. Supplies were given.     Return in about 2 weeks (around 8/23/2017), or if symptoms worsen or fail to improve, for Recheck.    My findings and recommendations were discussed and questions were answered.     Jairon Pierson MD  08/09/17  4:28 PM

## 2017-08-17 ENCOUNTER — OFFICE VISIT (OUTPATIENT)
Dept: NEUROLOGY | Facility: CLINIC | Age: 52
End: 2017-08-17

## 2017-08-17 VITALS
OXYGEN SATURATION: 99 % | HEART RATE: 64 BPM | RESPIRATION RATE: 20 BRPM | HEIGHT: 68 IN | DIASTOLIC BLOOD PRESSURE: 98 MMHG | BODY MASS INDEX: 30.16 KG/M2 | WEIGHT: 199 LBS | SYSTOLIC BLOOD PRESSURE: 142 MMHG

## 2017-08-17 DIAGNOSIS — R56.9 SEIZURES (HCC): ICD-10-CM

## 2017-08-17 DIAGNOSIS — E78.5 HYPERLIPIDEMIA, UNSPECIFIED HYPERLIPIDEMIA TYPE: Primary | ICD-10-CM

## 2017-08-17 DIAGNOSIS — I67.83 POSTERIOR REVERSIBLE ENCEPHALOPATHY SYNDROME: ICD-10-CM

## 2017-08-17 DIAGNOSIS — I15.9 SECONDARY HYPERTENSION: ICD-10-CM

## 2017-08-17 PROCEDURE — 99214 OFFICE O/P EST MOD 30 MIN: CPT | Performed by: CLINICAL NURSE SPECIALIST

## 2017-08-17 RX ORDER — OMEPRAZOLE 10 MG/1
10 CAPSULE, DELAYED RELEASE ORAL DAILY
COMMUNITY
End: 2017-11-20 | Stop reason: SDUPTHER

## 2017-08-17 RX ORDER — RANITIDINE HCL 75 MG
75 TABLET ORAL 2 TIMES DAILY
COMMUNITY
End: 2018-02-14

## 2017-08-17 RX ORDER — LEVETIRACETAM 500 MG/1
500 TABLET ORAL EVERY 12 HOURS SCHEDULED
Qty: 60 TABLET | Refills: 1 | Status: SHIPPED | OUTPATIENT
Start: 2017-08-17 | End: 2017-09-11 | Stop reason: SDUPTHER

## 2017-08-17 NOTE — PATIENT INSTRUCTIONS
"You Can Quit Smoking  If you are ready to quit smoking or are thinking about it, congratulations! You have chosen to help yourself be healthier and live longer! There are lots of different ways to quit smoking. Nicotine gum, nicotine patches, a nicotine inhaler, or nicotine nasal spray can help with physical craving. Hypnosis, support groups, and medicines help break the habit of smoking.  TIPS TO GET OFF AND STAY OFF CIGARETTES  · Learn to predict your moods. Do not let a bad situation be your excuse to have a cigarette. Some situations in your life might tempt you to have a cigarette.  · Ask friends and co-workers not to smoke around you.  · Make your home smoke-free.  · Never have \"just one\" cigarette. It leads to wanting another and another. Remind yourself of your decision to quit.  · On a card, make a list of your reasons for not smoking. Read it at least the same number of times a day as you have a cigarette. Tell yourself everyday, \"I do not want to smoke. I choose not to smoke.\"  · Ask someone at home or work to help you with your plan to quit smoking.  · Have something planned after you eat or have a cup of coffee. Take a walk or get other exercise to perk you up. This will help to keep you from overeating.  · Try a relaxation exercise to calm you down and decrease your stress. Remember, you may be tense and nervous the first two weeks after you quit. This will pass.  · Find new activities to keep your hands busy. Play with a pen, coin, or rubber band. Doodle or draw things on paper.  · Brush your teeth right after eating. This will help cut down the craving for the taste of tobacco after meals. You can try mouthwash too.  · Try gum, breath mints, or diet candy to keep something in your mouth.  IF YOU SMOKE AND WANT TO QUIT:  · Do not stock up on cigarettes. Never buy a carton. Wait until one pack is finished before you buy another.  · Never carry cigarettes with you at work or at home.  · Keep cigarettes " "as far away from you as possible. Leave them with someone else.  · Never carry matches or a lighter with you.  · Ask yourself, \"Do I need this cigarette or is this just a reflex?\"  · Bet with someone that you can quit. Put cigarette money in a "[employer] bank every morning. If you smoke, you give up the money. If you do not smoke, by the end of the week, you keep the money.  · Keep trying. It takes 21 days to change a habit!  · Talk to your doctor about using medicines to help you quit. These include nicotine replacement gum, lozenges, or skin patches.     This information is not intended to replace advice given to you by your health care provider. Make sure you discuss any questions you have with your health care provider.     Document Released: 10/14/2010 Document Revised: 03/11/2013 Document Reviewed: 05/03/2016  [employer] Patient Education ©2017 [employer]  Epilepsy  Epilepsy is a disorder in which a person has repeated seizures over time. A seizure is a release of abnormal electrical activity in the brain. Seizures can cause a change in attention, behavior, or the ability to remain awake and alert (altered mental status). Seizures often involve uncontrollable shaking (convulsions).   Most people with epilepsy lead normal lives. However, people with epilepsy are at an increased risk of falls, accidents, and injuries. Therefore, it is important to begin treatment right away.  CAUSES   Epilepsy has many possible causes. Anything that disturbs the normal pattern of brain cell activity can lead to seizures. This may include:   · Head injury.  · Birth trauma.  · High fever as a child.  · Stroke.  · Bleeding into or around the brain.  · Certain drugs.  · Prolonged low oxygen, such as what occurs after CPR efforts.  · Abnormal brain development.  · Certain illnesses, such as meningitis, encephalitis (brain infection), malaria, and other infections.  · An imbalance of nerve signaling chemicals " (neurotransmitters).    SIGNS AND SYMPTOMS   The symptoms of a seizure can vary greatly from one person to another. Right before a seizure, you may have a warning (aura) that a seizure is about to occur. An aura may include the following symptoms:  · Fear or anxiety.  · Nausea.  · Feeling like the room is spinning (vertigo).  · Vision changes, such as seeing flashing lights or spots.  Common symptoms during a seizure include:  · Abnormal sensations, such as an abnormal smell or a bitter taste in the mouth.    · Sudden, general body stiffness.    · Convulsions that involve rhythmic jerking of the face, arm, or leg on one or both sides.    · Sudden change in consciousness.      Appearing to be awake but not responding.      Appearing to be asleep but cannot be awakened.    · Grimacing, chewing, lip smacking, drooling, tongue biting, or loss of bowel or bladder control.  After a seizure, you may feel sleepy for a while.   DIAGNOSIS   Your health care provider will ask about your symptoms and take a medical history. Descriptions from any witnesses to your seizures will be very helpful in the diagnosis. A physical exam, including a detailed neurological exam, is necessary. Various tests may be done, such as:   · An electroencephalogram (EEG). This is a painless test of your brain waves. In this test, a diagram is created of your brain waves. These diagrams can be interpreted by a specialist.  · An MRI of the brain.    · A CT scan of the brain.    · A spinal tap (lumbar puncture, LP).  · Blood tests to check for signs of infection or abnormal blood chemistry.  TREATMENT   There is no cure for epilepsy, but it is generally treatable. Once epilepsy is diagnosed, it is important to begin treatment as soon as possible. For most people with epilepsy, seizures can be controlled with medicines. The following may also be used:  · A pacemaker for the brain (vagus nerve stimulator) can be used for people with seizures that are not  well controlled by medicine.  · Surgery on the brain.  For some people, epilepsy eventually goes away.  HOME CARE INSTRUCTIONS   ·  Follow your health care provider's recommendations on driving and safety in normal activities.  · Get enough rest. Lack of sleep can cause seizures.  · Only take over-the-counter or prescription medicines as directed by your health care provider. Take any prescribed medicine exactly as directed.  · Avoid any known triggers of your seizures.  · Keep a seizure diary. Record what you recall about any seizure, especially any possible trigger.    · Make sure the people you live and work with know that you are prone to seizures. They should receive instructions on how to help you. In general, a witness to a seizure should:      Cushion your head and body.      Turn you on your side.      Avoid unnecessarily restraining you.      Not place anything inside your mouth.      Call for emergency medical help if there is any question about what has occurred.    · Follow up with your health care provider as directed. You may need regular blood tests to monitor the levels of your medicine.    SEEK MEDICAL CARE IF:   · You develop signs of infection or other illness. This might increase the risk of a seizure.    · You seem to be having more frequent seizures.    · Your seizure pattern is changing.    SEEK IMMEDIATE MEDICAL CARE IF:   · You have a seizure that does not stop after a few moments.    · You have a seizure that causes any difficulty in breathing.    · You have a seizure that results in a very severe headache.    · You have a seizure that leaves you with the inability to speak or use a part of your body.       This information is not intended to replace advice given to you by your health care provider. Make sure you discuss any questions you have with your health care provider.     Document Released: 12/18/2006 Document Revised: 04/10/2017 Document Reviewed: 07/30/2014  [employer]  Patient Education ©2017 [employer]

## 2017-08-17 NOTE — PROGRESS NOTES
Subjective     Chief Complaint   Patient presents with   • Seizures     hospital follow up       Nishant Savage is a 52 y.o. male right handed  at [employer] in [place]. He is here today for follow up for seizure in the setting of PRES. He is accompanied by his mother. He presented to [facility] ED after tongue swelling and seizure. He was admitted to ICU and BP was tactfully controlled. However, on day 2 the patient began to show signs of DT as he was not completely forthright with use of ETOH. Dr. ANTONIETA Campos, neurology [facility], and [facility] ist, then to get the patient through DTs to sedate and intubate. Unfortunately the patient developed healthcare associated pneumonia, C. Diff, and eventually required tracheostomy which patient had reversed last week. The patient was initially started on Keppra 1000 mg BID and at discharge was reduced to 500 mg BID. Since discharge he denies seizure, staring spell, incontinence, tongue biting, or involuntary tremor. BP have been improved but is have some elevated BP that is managed by PCP. He has also not resumed ETOH and has stopped smoking. Patient states for the most part has returned to his baseline but does have times notice some cognition changes.     Seizures    This is a new problem. Episode onset: 7/3/17. Associated symptoms include sleepiness and confusion. Pertinent negatives include no headaches, no sore throat, no chest pain, no nausea, no vomiting and no diarrhea. Characteristics include rhythmic jerking, loss of consciousness and bit tongue. Characteristics do not include apnea. angioedema ETOH, PRES        Current Outpatient Prescriptions   Medication Sig Dispense Refill   • amLODIPine (NORVASC) 10 MG tablet Take 1 tablet by mouth Daily. 30 tablet 1   • carvedilol (COREG) 25 MG tablet Take 1 tablet by mouth Every 12 (Twelve) Hours. 60 tablet 1   • CloNIDine (CATAPRES) 0.1 MG tablet Take 1 tablet by mouth Every Night. 60 tablet 2   • colchicine 0.6  MG tablet Take 2 pills now, 1 pill in one hr.  Then Can take daily 14 tablet 3   • HYDROcodone-acetaminophen (NORCO)  MG per tablet Take 1 tablet by mouth Every 6 (Six) Hours As Needed for Moderate Pain (4-6) or Severe Pain  (7-10) (Gout). 9 tablet 0   • levETIRAcetam (KEPPRA) 500 MG tablet Take 1 tablet by mouth Every 12 (Twelve) Hours. 60 tablet 1   • losartan (COZAAR) 100 MG tablet Take 1 tablet by mouth Daily. 30 tablet 1   • omeprazole (PRILOSEC) 10 MG capsule Take 10 mg by mouth Daily.     • raNITIdine (ZANTAC) 75 MG tablet Take 75 mg by mouth 2 (Two) Times a Day.     • vardenafil (LEVITRA) 10 MG tablet Take 1 tablet by mouth Daily As Needed for erectile dysfunction. 10 tablet 5     No current facility-administered medications for this visit.        Past Medical History:   Diagnosis Date   • Angio-edema 7/3/2017   • Anxiety    • Arthritis    • Clostridium difficile colitis    • Erectile dysfunction 10/6/2016   • Gout    • HCAP (healthcare-associated pneumonia) 7/11/2017   • Hyperlipidemia    • Malignant hypertension    • MSSA (methicillin susceptible Staphylococcus aureus) infection 7/31/2017   • Obstructive sleep apnea    • Seizures 7/4/2017       Past Surgical History:   Procedure Laterality Date   • TRACHEOSTOMY N/A 7/12/2017    Procedure: TRACHEOSTOMY WITH TRACHEOSCOPY;  Surgeon: Jairon Pierson MD;  Location: [facility];  Service:        family history includes Diabetes in his maternal grandmother and mother; Heart attack in his paternal grandfather; Heart disease in his father; Hypertension in his father and mother; Kidney disease in his sister; No Known Problems in his daughter, maternal grandfather, paternal grandmother, and son.    Social History   Substance Use Topics   • Smoking status: Former Smoker     Packs/day: 0.33     Years: 30.00     Types: Cigarettes     Quit date: 7/3/2017   • Smokeless tobacco: None   • Alcohol use No      Comment: PER DAY FOR 30 YEARS; Last drink July 3, 2017  "      Review of Systems   Constitutional: Negative.  Negative for fatigue and fever.   HENT: Negative.  Negative for rhinorrhea and sore throat.         Tracheostomy reversed 8/2017   Eyes: Negative.    Respiratory: Negative.  Negative for apnea, choking and shortness of breath.    Cardiovascular: Negative.  Negative for chest pain.   Gastrointestinal: Negative.  Negative for constipation, diarrhea, nausea and vomiting.   Endocrine: Negative.    Genitourinary: Negative.  Negative for dysuria and frequency.   Musculoskeletal: Negative for arthralgias and myalgias.   Skin: Negative.    Allergic/Immunologic: Negative.    Neurological: Positive for seizures and loss of consciousness. Negative for dizziness, tremors, weakness and headaches.   Hematological: Negative.  Negative for adenopathy.   Psychiatric/Behavioral: Positive for confusion. Negative for agitation and hallucinations.   All other systems reviewed and are negative.      Objective     /98 (BP Location: Left arm, Patient Position: Sitting, Cuff Size: Adult)  Pulse 64  Resp 20  Ht 68\" (172.7 cm)  Wt 199 lb (90.3 kg)  SpO2 99%  BMI 30.26 kg/m2, Body mass index is 30.26 kg/(m^2).    Physical Exam   Constitutional: He is oriented to person, place, and time. He appears well-developed and well-nourished.   HENT:   Head: Normocephalic and atraumatic.   Right Ear: External ear normal.   Left Ear: External ear normal.   Nose: Nose normal.   Mouth/Throat: Oropharynx is clear and moist.   Eyes: Conjunctivae, EOM and lids are normal. Pupils are equal, round, and reactive to light.   Neck: Normal range of motion. Neck supple. Carotid bruit is not present.   Cardiovascular: Normal rate, regular rhythm, S1 normal, S2 normal and normal heart sounds.    Pulmonary/Chest: Effort normal and breath sounds normal.   Abdominal: Soft. Bowel sounds are normal.   Musculoskeletal: Normal range of motion.   Neurological: He is alert and oriented to person, place, and time. " He has normal strength and normal reflexes. He displays no tremor. No cranial nerve deficit or sensory deficit. He exhibits normal muscle tone. He displays a negative Romberg sign. Coordination and gait normal. GCS eye subscore is 4. GCS verbal subscore is 5. GCS motor subscore is 6.   Reflex Scores:       Tricep reflexes are 2+ on the right side and 2+ on the left side.       Bicep reflexes are 2+ on the right side and 2+ on the left side.       Brachioradialis reflexes are 2+ on the right side and 2+ on the left side.       Patellar reflexes are 2+ on the right side and 2+ on the left side.       Achilles reflexes are 2+ on the right side and 2+ on the left side.  Skin: Skin is warm and dry.   Psychiatric: He has a normal mood and affect. His speech is normal and behavior is normal. Cognition and memory are normal.   Nursing note and vitals reviewed.      Results for orders placed or performed during the hospital encounter of 07/03/17   Clostridium Difficile Toxin, PCR   Result Value Ref Range    C. Difficile Toxins by PCR Positive (A) Negative   Gastrointestinal Panel, PCR   Result Value Ref Range    Campylobacter Not Detected Not Detected    Clostridium difficile (toxin A/B) Detected (A) Not Detected, NA formed stool, C diff not applicable on patient less than one year of age    Plesiomonas shigelloides Not Detected Not Detected    Salmonella Not Detected Not Detected    Vibrio Not Detected Not Detected    Vibrio cholerae Not Detected Not Detected    Yersinia enterocolitica Not Detected Not Detected    Enteroaggregative E. coli (EAEC) Not Detected Not Detected    Enteropathogenic E. coli (EPEC) Not Detected Not Detected    Enterotoxigenic E. coli (ETEC) lt/st Not Detected Not Detected    Shiga-like toxin-producing E. coli (STEC) stx1/stx2 Not Detected Not Detected    E. coli O157 Not Detected Not Detected    Shigella/Enteroinvasive E. coli (EIEC) Not Detected Not Detected    Cryptosporidium Not Detected Not  Detected    Cyclospora cayetanensis Not Detected Not Detected    Entamoeba histolytica Not Detected Not Detected    Giardia lamblia Not Detected Not Detected    Adenovirus F40/41 Not Detected Not Detected    Astrovirus Not Detected Not Detected    Norovirus GI/GII Not Detected Not Detected    Rotavirus A Not Detected Not Detected    Sapovirus (I, II, IV or V) Not Detected Not Detected   Respiratory Culture   Result Value Ref Range    Respiratory Culture Heavy growth (4+) Staphylococcus aureus (A)     BETA LACTAMASE Positive     Gram Stain Result Greater than 25 WBCs per low power field     Gram Stain Result Many (4+) Mixed gram positive evelio        Susceptibility    Staphylococcus aureus - REMINGTON*     Clindamycin <=0.25 Susceptible ug/ml     Erythromycin <=0.25 Susceptible ug/ml     Gentamicin <=0.5 Susceptible ug/ml     Inducible Clindamycin Resistance NEG Negative      Levofloxacin* <=0.12 Susceptible ug/ml      * Staphylococcus species may develop resistance during prolonged therapy with quinolones. Isolates that are initially susceptible may become resistant within three to four days after initiation of therapy. Testing of repeat isolates may be warranted.      Oxacillin 0.5 Susceptible ug/ml     Penicillin G >=0.5 Resistant ug/ml     Tetracycline <=1 Susceptible ug/ml     Trimethoprim + Sulfamethoxazole <=10 Susceptible ug/ml     Vancomycin <=0.5 Susceptible ug/ml     *   This isolate does not demonstrate inducible clindamycin resistance in vitro.     Comprehensive Metabolic Panel   Result Value Ref Range    Glucose 127 (H) 70 - 100 mg/dL    BUN 9 5 - 21 mg/dL    Creatinine 1.19 0.50 - 1.40 mg/dL    Sodium 141 135 - 145 mmol/L    Potassium 3.0 (L) 3.5 - 5.3 mmol/L    Chloride 102 98 - 110 mmol/L    CO2 29.0 24.0 - 31.0 mmol/L    Calcium 8.8 8.4 - 10.4 mg/dL    Total Protein 6.9 6.3 - 8.7 g/dL    Albumin 4.00 3.50 - 5.00 g/dL    ALT (SGPT) 39 0 - 54 U/L    AST (SGOT) 72 (H) 7 - 45 U/L    Alkaline Phosphatase 69 24  - 120 U/L    Total Bilirubin 0.6 0.1 - 1.0 mg/dL    eGFR Non African Amer 64 >60 mL/min/1.73    Globulin 2.9 gm/dL    A/G Ratio 1.4 1.1 - 2.5 g/dL    BUN/Creatinine Ratio 7.6 7.0 - 25.0    Anion Gap 10.0 4.0 - 13.0 mmol/L   Urinalysis With / Culture If Indicated   Result Value Ref Range    Color, UA Yellow Yellow, Straw    Appearance, UA Clear Clear    pH, UA 7.5 5.0 - 8.0    Specific Gravity, UA 1.014 1.005 - 1.030    Glucose, UA Negative Negative    Ketones, UA Negative Negative    Bilirubin, UA Negative Negative    Blood, UA Small (1+) (A) Negative    Protein, UA 30 mg/dL (1+) (A) Negative    Leuk Esterase, UA Negative Negative    Nitrite, UA Negative Negative    Urobilinogen, UA 0.2 E.U./dL 0.2 - 1.0 E.U./dL   Urine Drug Screen   Result Value Ref Range    Amphetamine Screen, Urine Negative Negative    Barbiturates Screen, Urine Negative Negative    Benzodiazepine Screen, Urine Negative Negative    Cocaine Screen, Urine Negative Negative    Methadone Screen, Urine Negative Negative    Opiate Screen Negative Negative    Phencyclidine (PCP), Urine Negative Negative    THC, Screen, Urine Negative Negative   CBC Auto Differential   Result Value Ref Range    WBC 11.95 (H) 4.80 - 10.80 10*3/mm3    RBC 3.93 (L) 4.80 - 5.90 10*6/mm3    Hemoglobin 11.9 (L) 14.0 - 18.0 g/dL    Hematocrit 34.1 (L) 40.0 - 52.0 %    MCV 86.8 82.0 - 95.0 fL    MCH 30.3 28.0 - 32.0 pg    MCHC 34.9 33.0 - 36.0 g/dL    RDW 14.0 12.0 - 15.0 %    RDW-SD 44.0 40.0 - 54.0 fl    MPV 9.6 6.0 - 12.0 fL    Platelets 245 130 - 400 10*3/mm3    Neutrophil % 82.0 (H) 39.0 - 78.0 %    Lymphocyte % 8.3 (L) 15.0 - 45.0 %    Monocyte % 8.9 4.0 - 12.0 %    Eosinophil % 0.3 0.0 - 4.0 %    Basophil % 0.2 0.0 - 2.0 %    Immature Grans % 0.3 0.0 - 5.0 %    Neutrophils, Absolute 9.81 (H) 1.87 - 8.40 10*3/mm3    Lymphocytes, Absolute 0.99 0.72 - 4.86 10*3/mm3    Monocytes, Absolute 1.06 0.19 - 1.30 10*3/mm3    Eosinophils, Absolute 0.03 0.00 - 0.70 10*3/mm3     Basophils, Absolute 0.02 0.00 - 0.20 10*3/mm3    Immature Grans, Absolute 0.04 (H) 0.00 - 0.03 10*3/mm3   Urinalysis, Microscopic Only   Result Value Ref Range    RBC, UA 3-5 (A) None Seen /HPF    WBC, UA 0-2 (A) None Seen /HPF    Bacteria, UA None Seen None Seen /HPF    Squamous Epithelial Cells, UA 0-2 None Seen, 0-2 /HPF    Hyaline Casts, UA None Seen None Seen /LPF    Methodology Automated Microscopy    Basic Metabolic Panel   Result Value Ref Range    Glucose 126 (H) 70 - 100 mg/dL    BUN 12 5 - 21 mg/dL    Creatinine 1.13 0.50 - 1.40 mg/dL    Sodium 142 135 - 145 mmol/L    Potassium 3.8 3.5 - 5.3 mmol/L    Chloride 105 98 - 110 mmol/L    CO2 26.0 24.0 - 31.0 mmol/L    Calcium 9.1 8.4 - 10.4 mg/dL    eGFR Non African Amer 68 >60 mL/min/1.73    BUN/Creatinine Ratio 10.6 7.0 - 25.0    Anion Gap 11.0 4.0 - 13.0 mmol/L   Magnesium   Result Value Ref Range    Magnesium 2.0 1.4 - 2.2 mg/dL   Blood Gas, Arterial   Result Value Ref Range    Site Arterial: left brachial     Gera's Test      pH, Arterial 7.408 7.350 - 7.450 pH units    pCO2, Arterial 44.1 35.0 - 45.0 mm Hg    pO2, Arterial 235.2 (H) 80.0 - 100.0 mm Hg    HCO3, Arterial 27.2 (H) 22.0 - 26.0 mmol/L    Base Excess, Arterial 2.1 (H) -2.0 - 2.0 mmol/L    O2 Saturation, Arterial 99.5 94.0 - 100.0 %    O2 Saturation Calculated 99.5 94.0 - 100.0 %    Barometric Pressure for Blood Gas  mmHg    Modality Ventilator     FIO2 60 %    Ventilator Mode AC     Rate 10.0 Breaths/minute    PEEP 5.0     Vent CPAP/PEEP 5.0    CBC (No Diff)   Result Value Ref Range    WBC 15.95 (H) 4.80 - 10.80 10*3/mm3    RBC 4.19 (L) 4.80 - 5.90 10*6/mm3    Hemoglobin 12.8 (L) 14.0 - 18.0 g/dL    Hematocrit 38.1 (L) 40.0 - 52.0 %    MCV 90.9 82.0 - 95.0 fL    MCH 30.5 28.0 - 32.0 pg    MCHC 33.6 33.0 - 36.0 g/dL    RDW 14.7 12.0 - 15.0 %    RDW-SD 49.1 40.0 - 54.0 fl    MPV 9.6 6.0 - 12.0 fL    Platelets 246 130 - 400 10*3/mm3   Basic Metabolic Panel   Result Value Ref Range    Glucose 96  70 - 100 mg/dL    BUN 16 5 - 21 mg/dL    Creatinine 1.10 0.50 - 1.40 mg/dL    Sodium 143 135 - 145 mmol/L    Potassium 3.7 3.5 - 5.3 mmol/L    Chloride 105 98 - 110 mmol/L    CO2 28.0 24.0 - 31.0 mmol/L    Calcium 9.1 8.4 - 10.4 mg/dL    eGFR Non African Amer 71 >60 mL/min/1.73    BUN/Creatinine Ratio 14.5 7.0 - 25.0    Anion Gap 10.0 4.0 - 13.0 mmol/L   Blood Gas, Arterial   Result Value Ref Range    Site Arterial: right radial     Gera's Test      pH, Arterial 7.408 7.350 - 7.450 pH units    pCO2, Arterial 40.7 35.0 - 45.0 mm Hg    pO2, Arterial 101.5 (H) 80.0 - 100.0 mm Hg    HCO3, Arterial 25.1 22.0 - 26.0 mmol/L    Base Excess, Arterial 0.4 -2.0 - 2.0 mmol/L    O2 Saturation, Arterial 97.7 94.0 - 100.0 %    O2 Saturation Calculated 97.7 94.0 - 100.0 %    Barometric Pressure for Blood Gas  mmHg    Modality Ventilator     FIO2 50 %    Ventilator Mode Assist     Rate 10.0 Breaths/minute    PEEP 5.0     Vent CPAP/PEEP 5.0    Blood Gas, Arterial   Result Value Ref Range    Site Arterial: right radial     Gera's Test      pH, Arterial 7.414 7.350 - 7.450 pH units    pCO2, Arterial 43.0 35.0 - 45.0 mm Hg    pO2, Arterial 86.5 80.0 - 100.0 mm Hg    HCO3, Arterial 26.9 (H) 22.0 - 26.0 mmol/L    Base Excess, Arterial 2.0 -2.0 - 2.0 mmol/L    O2 Saturation, Arterial 96.6 94.0 - 100.0 %    O2 Saturation Calculated 96.6 94.0 - 100.0 %    Barometric Pressure for Blood Gas  mmHg    Modality Ventilator     FIO2 30 %    Ventilator Mode Assist     Rate 10.0 Breaths/minute    PEEP 5.0     Vent CPAP/PEEP 5.0    CBC (No Diff)   Result Value Ref Range    WBC 9.27 4.80 - 10.80 10*3/mm3    RBC 3.94 (L) 4.80 - 5.90 10*6/mm3    Hemoglobin 12.1 (L) 14.0 - 18.0 g/dL    Hematocrit 35.4 (L) 40.0 - 52.0 %    MCV 89.8 82.0 - 95.0 fL    MCH 30.7 28.0 - 32.0 pg    MCHC 34.2 33.0 - 36.0 g/dL    RDW 14.7 12.0 - 15.0 %    RDW-SD 48.2 40.0 - 54.0 fl    MPV 9.7 6.0 - 12.0 fL    Platelets 216 130 - 400 10*3/mm3   Basic Metabolic Panel   Result  Value Ref Range    Glucose 87 70 - 100 mg/dL    BUN 15 5 - 21 mg/dL    Creatinine 0.98 0.50 - 1.40 mg/dL    Sodium 142 135 - 145 mmol/L    Potassium 3.0 (L) 3.5 - 5.3 mmol/L    Chloride 108 98 - 110 mmol/L    CO2 27.0 24.0 - 31.0 mmol/L    Calcium 8.4 8.4 - 10.4 mg/dL    eGFR Non African Amer 81 >60 mL/min/1.73    BUN/Creatinine Ratio 15.3 7.0 - 25.0    Anion Gap 7.0 4.0 - 13.0 mmol/L   Magnesium   Result Value Ref Range    Magnesium 2.1 1.4 - 2.2 mg/dL   Blood Gas, Arterial   Result Value Ref Range    Site Arterial: right radial     Gera's Test      pH, Arterial 7.430 7.350 - 7.450 pH units    pCO2, Arterial 35.9 35.0 - 45.0 mm Hg    pO2, Arterial 95.6 80.0 - 100.0 mm Hg    HCO3, Arterial 23.3 22.0 - 26.0 mmol/L    Base Excess, Arterial -0.5 -2.0 - 2.0 mmol/L    O2 Saturation, Arterial 97.5 94.0 - 100.0 %    O2 Saturation Calculated 97.5 94.0 - 100.0 %    Barometric Pressure for Blood Gas  mmHg    Modality Ventilator     FIO2 30 %    Ventilator Mode AC     Rate 10.0 Breaths/minute    PEEP 5.0     Vent CPAP/PEEP 5.0    Basic Metabolic Panel   Result Value Ref Range    Glucose 119 (H) 70 - 100 mg/dL    BUN 14 5 - 21 mg/dL    Creatinine 1.05 0.50 - 1.40 mg/dL    Sodium 142 135 - 145 mmol/L    Potassium 3.2 (L) 3.5 - 5.3 mmol/L    Chloride 111 (H) 98 - 110 mmol/L    CO2 23.0 (L) 24.0 - 31.0 mmol/L    Calcium 8.0 (L) 8.4 - 10.4 mg/dL    eGFR Non African Amer 74 >60 mL/min/1.73    BUN/Creatinine Ratio 13.3 7.0 - 25.0    Anion Gap 8.0 4.0 - 13.0 mmol/L   Blood Gas, Arterial   Result Value Ref Range    Site Arterial: right radial     Gera's Test      pH, Arterial 7.434 7.350 - 7.450 pH units    pCO2, Arterial 31.5 (L) 35.0 - 45.0 mm Hg    pO2, Arterial 76.4 (L) 80.0 - 100.0 mm Hg    HCO3, Arterial 20.6 (L) 22.0 - 26.0 mmol/L    Base Excess, Arterial -2.5 (L) -2.0 - 2.0 mmol/L    O2 Saturation, Arterial 95.8 94.0 - 100.0 %    O2 Saturation Calculated 95.8 94.0 - 100.0 %    Barometric Pressure for Blood Gas  mmHg     Modality Ventilator     FIO2 30 %    Ventilator Mode AC     Rate 10.0 Breaths/minute    PEEP 5.0     Vent CPAP/PEEP 5.0    Basic Metabolic Panel   Result Value Ref Range    Glucose 131 (H) 70 - 100 mg/dL    BUN 11 5 - 21 mg/dL    Creatinine 0.98 0.50 - 1.40 mg/dL    Sodium 143 135 - 145 mmol/L    Potassium 3.5 3.5 - 5.3 mmol/L    Chloride 111 (H) 98 - 110 mmol/L    CO2 21.0 (L) 24.0 - 31.0 mmol/L    Calcium 8.0 (L) 8.4 - 10.4 mg/dL    eGFR Non African Amer 81 >60 mL/min/1.73    BUN/Creatinine Ratio 11.2 7.0 - 25.0    Anion Gap 11.0 4.0 - 13.0 mmol/L   Blood Gas, Arterial   Result Value Ref Range    Site Arterial: right radial     Gera's Test      pH, Arterial 7.422 7.350 - 7.450 pH units    pCO2, Arterial 35.8 35.0 - 45.0 mm Hg    pO2, Arterial 71.1 (L) 80.0 - 100.0 mm Hg    HCO3, Arterial 22.8 22.0 - 26.0 mmol/L    Base Excess, Arterial -1.1 -2.0 - 2.0 mmol/L    O2 Saturation, Arterial 94.7 94.0 - 100.0 %    O2 Saturation Calculated 94.7 94.0 - 100.0 %    Barometric Pressure for Blood Gas  mmHg    Modality Ventilator     FIO2 30 %    Ventilator Mode Assist     Rate 10.0 Breaths/minute    PEEP 5.0     Vent CPAP/PEEP 5.0    CBC Auto Differential   Result Value Ref Range    WBC 12.30 (H) 4.80 - 10.80 10*3/mm3    RBC 3.49 (L) 4.80 - 5.90 10*6/mm3    Hemoglobin 10.4 (L) 14.0 - 18.0 g/dL    Hematocrit 31.5 (L) 40.0 - 52.0 %    MCV 90.3 82.0 - 95.0 fL    MCH 29.8 28.0 - 32.0 pg    MCHC 33.0 33.0 - 36.0 g/dL    RDW 14.6 12.0 - 15.0 %    RDW-SD 48.3 40.0 - 54.0 fl    MPV 9.8 6.0 - 12.0 fL    Platelets 199 130 - 400 10*3/mm3    Neutrophil % 71.1 39.0 - 78.0 %    Lymphocyte % 11.7 (L) 15.0 - 45.0 %    Monocyte % 13.4 (H) 4.0 - 12.0 %    Eosinophil % 2.6 0.0 - 4.0 %    Basophil % 0.2 0.0 - 2.0 %    Immature Grans % 1.0 0.0 - 5.0 %    Neutrophils, Absolute 8.75 (H) 1.87 - 8.40 10*3/mm3    Lymphocytes, Absolute 1.44 0.72 - 4.86 10*3/mm3    Monocytes, Absolute 1.65 (H) 0.19 - 1.30 10*3/mm3    Eosinophils, Absolute 0.32 0.00 -  0.70 10*3/mm3    Basophils, Absolute 0.02 0.00 - 0.20 10*3/mm3    Immature Grans, Absolute 0.12 (H) 0.00 - 0.03 10*3/mm3   Basic Metabolic Panel   Result Value Ref Range    Glucose 108 (H) 70 - 100 mg/dL    BUN 11 5 - 21 mg/dL    Creatinine 0.91 0.50 - 1.40 mg/dL    Sodium 143 135 - 145 mmol/L    Potassium 3.7 3.5 - 5.3 mmol/L    Chloride 111 (H) 98 - 110 mmol/L    CO2 22.0 (L) 24.0 - 31.0 mmol/L    Calcium 8.0 (L) 8.4 - 10.4 mg/dL    eGFR Non African Amer 88 >60 mL/min/1.73    BUN/Creatinine Ratio 12.1 7.0 - 25.0    Anion Gap 10.0 4.0 - 13.0 mmol/L   Blood Gas, Arterial   Result Value Ref Range    Site Arterial: right radial     Gera's Test      pH, Arterial 7.417 7.350 - 7.450 pH units    pCO2, Arterial 36.3 35.0 - 45.0 mm Hg    pO2, Arterial 80.8 80.0 - 100.0 mm Hg    HCO3, Arterial 22.9 22.0 - 26.0 mmol/L    Base Excess, Arterial -1.1 -2.0 - 2.0 mmol/L    O2 Saturation, Arterial 96.2 94.0 - 100.0 %    O2 Saturation Calculated 96.2 94.0 - 100.0 %    Barometric Pressure for Blood Gas  mmHg    Modality Ventilator     FIO2 30 %    Ventilator Mode Assist     Rate 10.0 Breaths/minute    PEEP 5.0     Vent CPAP/PEEP 5.0    Urinalysis With / Culture If Indicated   Result Value Ref Range    Color, UA Dark Yellow (A) Yellow, Straw    Appearance, UA Clear Clear    pH, UA 5.5 5.0 - 8.0    Specific Gravity, UA 1.022 1.005 - 1.030    Glucose, UA Negative Negative    Ketones, UA Negative Negative    Bilirubin, UA Negative Negative    Blood, UA Negative Negative    Protein, UA Negative Negative    Leuk Esterase, UA Negative Negative    Nitrite, UA Negative Negative    Urobilinogen, UA 1.0 E.U./dL 0.2 - 1.0 E.U./dL   Comprehensive Metabolic Panel   Result Value Ref Range    Glucose 182 (H) 70 - 100 mg/dL    BUN 13 5 - 21 mg/dL    Creatinine 0.98 0.50 - 1.40 mg/dL    Sodium 144 135 - 145 mmol/L    Potassium 4.1 3.5 - 5.3 mmol/L    Chloride 111 (H) 98 - 110 mmol/L    CO2 24.0 24.0 - 31.0 mmol/L    Calcium 8.6 8.4 - 10.4 mg/dL     Total Protein 6.5 6.3 - 8.7 g/dL    Albumin 3.40 (L) 3.50 - 5.00 g/dL    ALT (SGPT) 42 0 - 54 U/L    AST (SGOT) 51 (H) 7 - 45 U/L    Alkaline Phosphatase 155 (H) 24 - 120 U/L    Total Bilirubin 0.5 0.1 - 1.0 mg/dL    eGFR Non African Amer 81 >60 mL/min/1.73    Globulin 3.1 gm/dL    A/G Ratio 1.1 1.1 - 2.5 g/dL    BUN/Creatinine Ratio 13.3 7.0 - 25.0    Anion Gap 9.0 4.0 - 13.0 mmol/L   Vancomycin, Trough   Result Value Ref Range    Vancomycin Trough 12.99 10.00 - 20.00 mcg/mL   Blood Gas, Arterial   Result Value Ref Range    Site Arterial: right radial     Gera's Test      pH, Arterial 7.443 7.350 - 7.450 pH units    pCO2, Arterial 32.9 (L) 35.0 - 45.0 mm Hg    pO2, Arterial 55.9 (L) 80.0 - 100.0 mm Hg    HCO3, Arterial 22.0 22.0 - 26.0 mmol/L    Base Excess, Arterial -1.2 -2.0 - 2.0 mmol/L    O2 Saturation, Arterial 90.6 (L) 94.0 - 100.0 %    O2 Saturation Calculated 90.6 (L) 94.0 - 100.0 %    Barometric Pressure for Blood Gas  mmHg    Modality Ventilator     FIO2 30 %    Rate 10.0 Breaths/minute    PEEP 5.0     Vent CPAP/PEEP 5.0    Basic Metabolic Panel   Result Value Ref Range    Glucose 143 (H) 70 - 100 mg/dL    BUN 21 5 - 21 mg/dL    Creatinine 1.07 0.50 - 1.40 mg/dL    Sodium 147 (H) 135 - 145 mmol/L    Potassium 3.7 3.5 - 5.3 mmol/L    Chloride 109 98 - 110 mmol/L    CO2 25.0 24.0 - 31.0 mmol/L    Calcium 8.9 8.4 - 10.4 mg/dL    eGFR Non African Amer 73 >60 mL/min/1.73    BUN/Creatinine Ratio 19.6 7.0 - 25.0    Anion Gap 13.0 4.0 - 13.0 mmol/L   Blood Gas, Arterial   Result Value Ref Range    Site Arterial: right radial     Gera's Test      pH, Arterial 7.495 (H) 7.350 - 7.450 pH units    pCO2, Arterial 31.1 (L) 35.0 - 45.0 mm Hg    pO2, Arterial 76.1 (L) 80.0 - 100.0 mm Hg    HCO3, Arterial 23.4 22.0 - 26.0 mmol/L    Base Excess, Arterial 1.1 -2.0 - 2.0 mmol/L    O2 Saturation, Arterial 96.3 94.0 - 100.0 %    O2 Saturation Calculated 96.3 94.0 - 100.0 %    Barometric Pressure for Blood Gas  mmHg     Modality Ventilator     FIO2 40 %    Ventilator Mode AC     Rate 10.0 Breaths/minute    PEEP 10.0     Vent CPAP/PEEP 10.0    Basic Metabolic Panel   Result Value Ref Range    Glucose 147 (H) 70 - 100 mg/dL    BUN 29 (H) 5 - 21 mg/dL    Creatinine 1.06 0.50 - 1.40 mg/dL    Sodium 147 (H) 135 - 145 mmol/L    Potassium 3.3 (L) 3.5 - 5.3 mmol/L    Chloride 106 98 - 110 mmol/L    CO2 28.0 24.0 - 31.0 mmol/L    Calcium 9.0 8.4 - 10.4 mg/dL    eGFR Non African Amer 74 >60 mL/min/1.73    BUN/Creatinine Ratio 27.4 (H) 7.0 - 25.0    Anion Gap 13.0 4.0 - 13.0 mmol/L   CBC Auto Differential   Result Value Ref Range    WBC 16.64 (H) 4.80 - 10.80 10*3/mm3    RBC 3.49 (L) 4.80 - 5.90 10*6/mm3    Hemoglobin 10.4 (L) 14.0 - 18.0 g/dL    Hematocrit 31.3 (L) 40.0 - 52.0 %    MCV 89.7 82.0 - 95.0 fL    MCH 29.8 28.0 - 32.0 pg    MCHC 33.2 33.0 - 36.0 g/dL    RDW 14.7 12.0 - 15.0 %    RDW-SD 47.8 40.0 - 54.0 fl    MPV 9.6 6.0 - 12.0 fL    Platelets 349 130 - 400 10*3/mm3   Scan Slide   Result Value Ref Range    Scan Slide     Blood Gas, Arterial   Result Value Ref Range    Site Arterial: right radial     Gera's Test      pH, Arterial 7.498 (H) 7.350 - 7.450 pH units    pCO2, Arterial 36.6 35.0 - 45.0 mm Hg    pO2, Arterial 76.8 (L) 80.0 - 100.0 mm Hg    HCO3, Arterial 27.8 (H) 22.0 - 26.0 mmol/L    Base Excess, Arterial 4.6 (H) -2.0 - 2.0 mmol/L    O2 Saturation, Arterial 96.4 94.0 - 100.0 %    O2 Saturation Calculated 96.4 94.0 - 100.0 %    Barometric Pressure for Blood Gas  mmHg    Modality Ventilator     FIO2 40 %    Ventilator Mode AC     Rate 10.0 Breaths/minute    PEEP 10.0     Vent CPAP/PEEP 10.0    Blood Gas, Arterial   Result Value Ref Range    Site Arterial: right radial     Gera's Test      pH, Arterial 7.441 7.350 - 7.450 pH units    pCO2, Arterial 38.8 35.0 - 45.0 mm Hg    pO2, Arterial 105.3 (H) 80.0 - 100.0 mm Hg    HCO3, Arterial 25.8 22.0 - 26.0 mmol/L    Base Excess, Arterial 1.8 -2.0 - 2.0 mmol/L    O2 Saturation,  Arterial 98.0 94.0 - 100.0 %    O2 Saturation Calculated 98.0 94.0 - 100.0 %    Barometric Pressure for Blood Gas  mmHg    Modality Ventilator     FIO2 40 %    Ventilator Mode AC     Rate 10.0 Breaths/minute    PEEP 10.0     Vent CPAP/PEEP 10.0    Basic Metabolic Panel   Result Value Ref Range    Glucose 105 (H) 70 - 100 mg/dL    BUN 24 (H) 5 - 21 mg/dL    Creatinine 0.99 0.50 - 1.40 mg/dL    Sodium 147 (H) 135 - 145 mmol/L    Potassium 2.9 (C) 3.5 - 5.3 mmol/L    Chloride 111 (H) 98 - 110 mmol/L    CO2 24.0 24.0 - 31.0 mmol/L    Calcium 9.1 8.4 - 10.4 mg/dL    eGFR Non African Amer 80 >60 mL/min/1.73    BUN/Creatinine Ratio 24.2 7.0 - 25.0    Anion Gap 12.0 4.0 - 13.0 mmol/L   CBC Auto Differential   Result Value Ref Range    WBC 16.14 (H) 4.80 - 10.80 10*3/mm3    RBC 3.64 (L) 4.80 - 5.90 10*6/mm3    Hemoglobin 10.9 (L) 14.0 - 18.0 g/dL    Hematocrit 32.8 (L) 40.0 - 52.0 %    MCV 90.1 82.0 - 95.0 fL    MCH 29.9 28.0 - 32.0 pg    MCHC 33.2 33.0 - 36.0 g/dL    RDW 14.5 12.0 - 15.0 %    RDW-SD 47.7 40.0 - 54.0 fl    MPV 9.8 6.0 - 12.0 fL    Platelets 400 130 - 400 10*3/mm3    Neutrophil % 68.5 39.0 - 78.0 %    Lymphocyte % 18.7 15.0 - 45.0 %    Monocyte % 8.0 4.0 - 12.0 %    Eosinophil % 0.6 0.0 - 4.0 %    Basophil % 0.2 0.0 - 2.0 %    Immature Grans % 4.0 0.0 - 5.0 %    Neutrophils, Absolute 11.04 (H) 1.87 - 8.40 10*3/mm3    Lymphocytes, Absolute 3.02 0.72 - 4.86 10*3/mm3    Monocytes, Absolute 1.29 0.19 - 1.30 10*3/mm3    Eosinophils, Absolute 0.10 0.00 - 0.70 10*3/mm3    Basophils, Absolute 0.04 0.00 - 0.20 10*3/mm3    Immature Grans, Absolute 0.65 (H) 0.00 - 0.03 10*3/mm3   Blood Gas, Arterial   Result Value Ref Range    Site Arterial: right radial     Gera's Test      pH, Arterial 7.444 7.350 - 7.450 pH units    pCO2, Arterial 33.7 (L) 35.0 - 45.0 mm Hg    pO2, Arterial 75.2 (L) 80.0 - 100.0 mm Hg    HCO3, Arterial 22.6 22.0 - 26.0 mmol/L    Base Excess, Arterial -0.7 -2.0 - 2.0 mmol/L    O2 Saturation, Arterial  95.7 94.0 - 100.0 %    O2 Saturation Calculated 95.7 94.0 - 100.0 %    Barometric Pressure for Blood Gas  mmHg    Modality Ventilator     FIO2 40 %    Ventilator Mode Assist     Rate 10.0 Breaths/minute    PEEP 8.0     Vent CPAP/PEEP 8.0    Basic Metabolic Panel   Result Value Ref Range    Glucose 93 70 - 100 mg/dL    BUN 18 5 - 21 mg/dL    Creatinine 0.90 0.50 - 1.40 mg/dL    Sodium 146 (H) 135 - 145 mmol/L    Potassium 3.4 (L) 3.5 - 5.3 mmol/L    Chloride 110 98 - 110 mmol/L    CO2 25.0 24.0 - 31.0 mmol/L    Calcium 8.8 8.4 - 10.4 mg/dL    eGFR Non African Amer 89 >60 mL/min/1.73    BUN/Creatinine Ratio 20.0 7.0 - 25.0    Anion Gap 11.0 4.0 - 13.0 mmol/L   CBC (No Diff)   Result Value Ref Range    WBC 16.11 (H) 4.80 - 10.80 10*3/mm3    RBC 3.76 (L) 4.80 - 5.90 10*6/mm3    Hemoglobin 11.2 (L) 14.0 - 18.0 g/dL    Hematocrit 33.8 (L) 40.0 - 52.0 %    MCV 89.9 82.0 - 95.0 fL    MCH 29.8 28.0 - 32.0 pg    MCHC 33.1 33.0 - 36.0 g/dL    RDW 14.4 12.0 - 15.0 %    RDW-SD 47.0 40.0 - 54.0 fl    MPV 9.8 6.0 - 12.0 fL    Platelets 366 130 - 400 10*3/mm3   Magnesium   Result Value Ref Range    Magnesium 2.1 1.4 - 2.2 mg/dL   Blood Gas, Arterial   Result Value Ref Range    Site Arterial: right radial     Gera's Test      pH, Arterial 7.435 7.350 - 7.450 pH units    pCO2, Arterial 35.4 35.0 - 45.0 mm Hg    pO2, Arterial 85.1 80.0 - 100.0 mm Hg    HCO3, Arterial 23.2 22.0 - 26.0 mmol/L    Base Excess, Arterial -0.4 -2.0 - 2.0 mmol/L    O2 Saturation, Arterial 96.8 94.0 - 100.0 %    O2 Saturation Calculated 96.8 94.0 - 100.0 %    Barometric Pressure for Blood Gas  mmHg    Modality Cool Aerosol     Flow Rate 35.00 lpm   Basic Metabolic Panel   Result Value Ref Range    Glucose 131 (H) 70 - 100 mg/dL    BUN 18 5 - 21 mg/dL    Creatinine 0.95 0.50 - 1.40 mg/dL    Sodium 143 135 - 145 mmol/L    Potassium 3.6 3.5 - 5.3 mmol/L    Chloride 110 98 - 110 mmol/L    CO2 23.0 (L) 24.0 - 31.0 mmol/L    Calcium 8.8 8.4 - 10.4 mg/dL    eGFR Non   Amer 84 >60 mL/min/1.73    BUN/Creatinine Ratio 18.9 7.0 - 25.0    Anion Gap 10.0 4.0 - 13.0 mmol/L   CBC (No Diff)   Result Value Ref Range    WBC 19.16 (H) 4.80 - 10.80 10*3/mm3    RBC 3.84 (L) 4.80 - 5.90 10*6/mm3    Hemoglobin 11.3 (L) 14.0 - 18.0 g/dL    Hematocrit 34.0 (L) 40.0 - 52.0 %    MCV 88.5 82.0 - 95.0 fL    MCH 29.4 28.0 - 32.0 pg    MCHC 33.2 33.0 - 36.0 g/dL    RDW 14.4 12.0 - 15.0 %    RDW-SD 46.8 40.0 - 54.0 fl    MPV 9.8 6.0 - 12.0 fL    Platelets 357 130 - 400 10*3/mm3   Basic Metabolic Panel   Result Value Ref Range    Glucose 86 70 - 100 mg/dL    BUN 17 5 - 21 mg/dL    Creatinine 0.95 0.50 - 1.40 mg/dL    Sodium 142 135 - 145 mmol/L    Potassium 3.9 3.5 - 5.3 mmol/L    Chloride 109 98 - 110 mmol/L    CO2 22.0 (L) 24.0 - 31.0 mmol/L    Calcium 9.3 8.4 - 10.4 mg/dL    eGFR Non African Amer 84 >60 mL/min/1.73    BUN/Creatinine Ratio 17.9 7.0 - 25.0    Anion Gap 11.0 4.0 - 13.0 mmol/L   CBC (No Diff)   Result Value Ref Range    WBC 23.30 (H) 4.80 - 10.80 10*3/mm3    RBC 3.05 (L) 4.80 - 5.90 10*6/mm3    Hemoglobin 9.2 (L) 14.0 - 18.0 g/dL    Hematocrit 27.4 (L) 40.0 - 52.0 %    MCV 89.8 82.0 - 95.0 fL    MCH 30.2 28.0 - 32.0 pg    MCHC 33.6 33.0 - 36.0 g/dL    RDW 14.7 12.0 - 15.0 %    RDW-SD 48.4 40.0 - 54.0 fl    MPV 10.2 6.0 - 12.0 fL    Platelets 493 (H) 130 - 400 10*3/mm3   Basic Metabolic Panel   Result Value Ref Range    Glucose 85 70 - 100 mg/dL    BUN 22 (H) 5 - 21 mg/dL    Creatinine 1.17 0.50 - 1.40 mg/dL    Sodium 144 135 - 145 mmol/L    Potassium 3.9 3.5 - 5.3 mmol/L    Chloride 108 98 - 110 mmol/L    CO2 22.0 (L) 24.0 - 31.0 mmol/L    Calcium 9.7 8.4 - 10.4 mg/dL    eGFR Non African Amer 66 >60 mL/min/1.73    BUN/Creatinine Ratio 18.8 7.0 - 25.0    Anion Gap 14.0 (H) 4.0 - 13.0 mmol/L   CBC Auto Differential   Result Value Ref Range    WBC 14.20 (H) 4.80 - 10.80 10*3/mm3    RBC 4.00 (L) 4.80 - 5.90 10*6/mm3    Hemoglobin 11.8 (L) 14.0 - 18.0 g/dL    Hematocrit 36.0 (L) 40.0 -  52.0 %    MCV 90.0 82.0 - 95.0 fL    MCH 29.5 28.0 - 32.0 pg    MCHC 32.8 (L) 33.0 - 36.0 g/dL    RDW 14.4 12.0 - 15.0 %    RDW-SD 47.6 40.0 - 54.0 fl    MPV 10.4 6.0 - 12.0 fL    Platelets 413 (H) 130 - 400 10*3/mm3    Neutrophil % 73.2 39.0 - 78.0 %    Lymphocyte % 13.7 (L) 15.0 - 45.0 %    Monocyte % 9.9 4.0 - 12.0 %    Eosinophil % 2.3 0.0 - 4.0 %    Basophil % 0.2 0.0 - 2.0 %    Immature Grans % 0.7 0.0 - 5.0 %    Neutrophils, Absolute 10.41 (H) 1.87 - 8.40 10*3/mm3    Lymphocytes, Absolute 1.94 0.72 - 4.86 10*3/mm3    Monocytes, Absolute 1.40 (H) 0.19 - 1.30 10*3/mm3    Eosinophils, Absolute 0.32 0.00 - 0.70 10*3/mm3    Basophils, Absolute 0.03 0.00 - 0.20 10*3/mm3    Immature Grans, Absolute 0.10 (H) 0.00 - 0.03 10*3/mm3   Uric Acid   Result Value Ref Range    Uric Acid 7.1 3.5 - 8.5 mg/dL   Basic Metabolic Panel   Result Value Ref Range    Glucose 87 70 - 100 mg/dL    BUN 21 5 - 21 mg/dL    Creatinine 1.10 0.50 - 1.40 mg/dL    Sodium 144 135 - 145 mmol/L    Potassium 4.4 3.5 - 5.3 mmol/L    Chloride 112 (H) 98 - 110 mmol/L    CO2 20.0 (L) 24.0 - 31.0 mmol/L    Calcium 9.2 8.4 - 10.4 mg/dL    eGFR Non African Amer 71 >60 mL/min/1.73    BUN/Creatinine Ratio 19.1 7.0 - 25.0    Anion Gap 12.0 4.0 - 13.0 mmol/L   CBC Auto Differential   Result Value Ref Range    WBC 8.50 4.80 - 10.80 10*3/mm3    RBC 3.55 (L) 4.80 - 5.90 10*6/mm3    Hemoglobin 10.3 (L) 14.0 - 18.0 g/dL    Hematocrit 32.0 (L) 40.0 - 52.0 %    MCV 90.1 82.0 - 95.0 fL    MCH 29.0 28.0 - 32.0 pg    MCHC 32.2 (L) 33.0 - 36.0 g/dL    RDW 14.5 12.0 - 15.0 %    RDW-SD 47.8 40.0 - 54.0 fl    MPV 9.9 6.0 - 12.0 fL    Platelets 357 130 - 400 10*3/mm3    Neutrophil % 61.1 39.0 - 78.0 %    Lymphocyte % 19.8 15.0 - 45.0 %    Monocyte % 13.9 (H) 4.0 - 12.0 %    Eosinophil % 4.0 0.0 - 4.0 %    Basophil % 0.4 0.0 - 2.0 %    Immature Grans % 0.8 0.0 - 5.0 %    Neutrophils, Absolute 5.20 1.87 - 8.40 10*3/mm3    Lymphocytes, Absolute 1.68 0.72 - 4.86 10*3/mm3     Monocytes, Absolute 1.18 0.19 - 1.30 10*3/mm3    Eosinophils, Absolute 0.34 0.00 - 0.70 10*3/mm3    Basophils, Absolute 0.03 0.00 - 0.20 10*3/mm3    Immature Grans, Absolute 0.07 (H) 0.00 - 0.03 10*3/mm3   POC Glucose Fingerstick   Result Value Ref Range    Glucose 104 70 - 130 mg/dL   Adult Transthoracic Echo Complete With Contrast   Result Value Ref Range    IVSd 0.99 cm    LVIDd 4.3 cm    LVIDs 2.6 cm    LVPWd 1.5 cm    IVS/LVPW 0.66     FS 39.7 %    EDV(Teich) 83.5 ml    ESV(Teich) 24.6 ml    EF(Teich) 70.5 %    EDV(cubed) 80.1 ml    ESV(cubed) 17.6 ml    EF(cubed) 78.0 %    LV mass(C)d 194.2 grams    SV(Teich) 58.9 ml    SV(cubed) 62.5 ml    Ao root diam 3.7 cm    Ao root area 10.8 cm^2    LA dimension 3.6 cm    LA/Ao 0.97     LVOT diam 2.1 cm    LVOT area 3.5 cm^2    LVOT area(traced) 3.5 cm^2    LVLd ap4 9.3 cm    EDV(MOD-sp4) 161.0 ml    LVLs ap4 7.7 cm    ESV(MOD-sp4) 47.1 ml    EF(MOD-sp4) 70.7 %    SV(MOD-sp4) 113.9 ml    CONTRAST EF 4CH 70.7 ml/m^2    MV E max javier 87.8 cm/sec    MV A max javier 97.5 cm/sec    MV E/A 0.9     MV dec time 0.22 sec    Ao pk javier 162.0 cm/sec    Ao max PG 10.5 mmHg    Ao max PG (full) 3.0 mmHg    Ao V2 mean 101.0 cm/sec    Ao mean PG 5.0 mmHg    Ao mean PG (full) 2.0 mmHg    Ao V2 VTI 28.4 cm    LAUREL(I,A) 3.2 cm^2    LAUREL(I,D) 3.2 cm^2    LAUREL(V,A) 2.9 cm^2    LAUREL(V,D) 2.9 cm^2    LV V1 max PG 7.5 mmHg    LV V1 mean PG 3.0 mmHg    LV V1 max 137.0 cm/sec    LV V1 mean 81.0 cm/sec    LV V1 VTI 26.4 cm    SV(Ao) 305.4 ml    SV(LVOT) 91.4 ml    LA volume 55.0 cm3    E/E' ratio 10.6     Lat Peak E' Javier 10.3 cm/sec   Prepare Fresh Frozen Plasma, 2 Units   Result Value Ref Range    Product Code V4686SU2     Unit Number [id number]     UNIT  ABO O     UNIT  RH POS     Dispense Status PT     Blood Type OP     Blood Expiration Date 201707121710     Blood Type Barcode 5100     Product Code H2862O28     Unit Number [id number]     UNIT  ABO O     UNIT  RH POS     Dispense Status PT      Blood Type [facility]     Blood Expiration Date [id number]     Blood Type Barcode 5100    ABO / Rh   Result Value Ref Range    ABO Type O     RH type Negative    Manual Differential   Result Value Ref Range    Neutrophil % 61.0 39.0 - 78.0 %    Lymphocyte % 23.0 15.0 - 45.0 %    Monocyte % 3.0 (L) 4.0 - 12.0 %    Bands %  7.0 0.0 - 10.0 %    Metamyelocyte % 2.0 (H) 0.0 - 0.0 %    Myelocyte % 3.0 (H) 0.0 - 0.0 %    Atypical Lymphocyte % 1.0 0.0 - 5.0 %    Neutrophils Absolute 11.32 (H) 1.87 - 8.40 10*3/mm3    Lymphocytes Absolute 3.83 0.72 - 4.86 10*3/mm3    Monocytes Absolute 0.50 0.19 - 1.30 10*3/mm3    Hypochromia Slight/1+ None Seen    WBC Morphology Normal Normal    Platelet Morphology Normal Normal      MRI BRAIN:Findings: This is an abnormal EEG recording secondary to slowing of the background rhythm.  This could be consistent with a metabolic or medication related encephalopathy.  No definitive seizure activity was noted.  Clinical correlation is recommended.      EEG:IMPRESSION:  1. Posterior reversible encephalopathy syndrome. Follow-up in 8 weeks is  suggested.             ASSESSMENT/PLAN    Diagnoses and all orders for this visit:    Hyperlipidemia, unspecified hyperlipidemia type  -     MRI Brain With & Without Contrast; Future    Secondary hypertension  -     MRI Brain With & Without Contrast; Future    Seizures  -     MRI Brain With & Without Contrast; Future    Posterior reversible encephalopathy syndrome  -     MRI Brain With & Without Contrast; Future    Other orders  -     raNITIdine (ZANTAC) 75 MG tablet; Take 75 mg by mouth 2 (Two) Times a Day.  -     omeprazole (PRILOSEC) 10 MG capsule; Take 10 mg by mouth Daily.  -     levETIRAcetam (KEPPRA) 500 MG tablet; Take 1 tablet by mouth Every 12 (Twelve) Hours.    MEDICAL DECISION MAKING:    Seizure in the setting of PRES:   1. Continue with plan outlined by Dr. Arroyo by repeating MRI brain   2. If MRI brain improved will d/c Keppra and repeat EEG 6  weeks later  3. No driving for 90 days beginning from 7/3/17  4. Counseled on ETOH use  5. Patient denies tobacco use since discharge.  6. Seizure precautions were discussed to include no tub baths, no swimming, avoiding lack of sleep, and avoiding known triggers. Education given of things that may contribute to a seizure to include, but not limited to: stressful situations, fever, fatigue, lack of sleep, low blood sugar, hyperventilation, flashing lights, and caffeine. Instructions given to take seizure medications as prescribed. Education given to family member on what to do during a seizure and care following the seizure. Education given to contact this office prior to stopping or changing any medications.  7. Elevated BMI -      Patient given printed education with AVS for diet/exerise with AVS and encouraged to include 30 minutes of exercise 3-4 times weekly.  8. BP managed by PCP    At least 25 minutes spent in counseling and answering questions.     allergies and all known medications/prescriptions have been reviewed using resources available on this encounter.    Return in about 6 weeks (around 9/28/2017).        EPI Silveira

## 2017-08-18 ENCOUNTER — HOSPITAL ENCOUNTER (OUTPATIENT)
Dept: GENERAL RADIOLOGY | Facility: HOSPITAL | Age: 52
Discharge: HOME | End: 2017-08-18
Admitting: NURSE PRACTITIONER

## 2017-08-18 ENCOUNTER — OFFICE VISIT (OUTPATIENT)
Dept: FAMILY MEDICINE CLINIC | Facility: CLINIC | Age: 52
End: 2017-08-18

## 2017-08-18 VITALS
TEMPERATURE: 97.9 F | BODY MASS INDEX: 31.08 KG/M2 | OXYGEN SATURATION: 98 % | DIASTOLIC BLOOD PRESSURE: 70 MMHG | SYSTOLIC BLOOD PRESSURE: 116 MMHG | WEIGHT: 198 LBS | RESPIRATION RATE: 16 BRPM | HEART RATE: 91 BPM | HEIGHT: 67 IN

## 2017-08-18 DIAGNOSIS — I10 ESSENTIAL HYPERTENSION: ICD-10-CM

## 2017-08-18 DIAGNOSIS — M79.672 LEFT FOOT PAIN: Primary | ICD-10-CM

## 2017-08-18 DIAGNOSIS — E83.42 HYPOMAGNESEMIA: ICD-10-CM

## 2017-08-18 PROCEDURE — 73630 X-RAY EXAM OF FOOT: CPT

## 2017-08-18 PROCEDURE — 99213 OFFICE O/P EST LOW 20 MIN: CPT | Performed by: NURSE PRACTITIONER

## 2017-08-18 NOTE — PROGRESS NOTES
Chief Complaint   Patient presents with   • trach removed     follow up needs lab repeat        Allergies   Allergen Reactions   • Lipitor [Atorvastatin] Rash   • Lisinopril Angioedema       HPI:  Nishant Savage is a 52 y.o. male presents today for follow up after having his trach removed.    He was recently in the hospital for seizure activity and intoxication, tongue swelling, and posterioir reversible encephalopathy syndrome.  He was admitted to ICU, was placed on Keppra, and a vent. He had difficulties with alcohol withdrawal, and complications of ventilator associated pneumonia, and cdiff. He had to have a tracheostomy placement to wean from ventilatory support.  He rebounded very nicely.  His BP meds were adjusted and he was to continue amlodipine, clonidine to oral, continue Keppra for 6-8 weeks, put on broad spectrum antibiotics for potential pneumonia, Staph and MSSA in suptum. He was placed on indomethacin and colchicine for gout.  He did well with speech therapy and occupational therapy.  He was discharged and has been doing very well at home with the except of a swollen painful foot.  Rates pain 8/10.  Chronic problems include HTN stable with amlodipine, losartan, coreg, and clonidine, gout stable with colchicine, GERD stable with omeprazole, ED stable with vardenafil.      PCP currently listed as Linda Hoffman, DNP, APRN.     Past Medical History:   Diagnosis Date   • Angio-edema 7/3/2017   • Anxiety    • Arthritis    • Clostridium difficile colitis    • Erectile dysfunction 10/6/2016   • Gout    • HCAP (healthcare-associated pneumonia) 7/11/2017   • Hyperlipidemia    • Malignant hypertension    • MSSA (methicillin susceptible Staphylococcus aureus) infection 7/31/2017   • Obstructive sleep apnea    • Seizures 7/4/2017     Past Surgical History:   Procedure Laterality Date   • TRACHEOSTOMY N/A 7/12/2017    Procedure: TRACHEOSTOMY WITH TRACHEOSCOPY;  Surgeon: Jairon Pierson MD;  Location: [facility];   Service:      Social History     Social History   • Marital status: Single     Spouse name: N/A   • Number of children: N/A   • Years of education: N/A     Occupational History   • Not on file.     Social History Main Topics   • Smoking status: Former Smoker     Packs/day: 0.33     Years: 30.00     Types: Cigarettes     Quit date: 7/3/2017   • Smokeless tobacco: Never Used   • Alcohol use No      Comment: PER DAY FOR 30 YEARS; Last drink July 3, 2017   • Drug use: No   • Sexual activity: Defer     Other Topics Concern   • Not on file     Social History Narrative     Family History   Problem Relation Age of Onset   • Hypertension Mother    • Diabetes Mother    • Heart disease Father    • Hypertension Father    • No Known Problems Daughter    • No Known Problems Son    • Diabetes Maternal Grandmother    • No Known Problems Maternal Grandfather    • No Known Problems Paternal Grandmother    • Heart attack Paternal Grandfather    • Kidney disease Sister        Current Outpatient Prescriptions:   •  amLODIPine (NORVASC) 10 MG tablet, Take 1 tablet by mouth Daily., Disp: 30 tablet, Rfl: 1  •  carvedilol (COREG) 25 MG tablet, Take 1 tablet by mouth Every 12 (Twelve) Hours., Disp: 60 tablet, Rfl: 1  •  CloNIDine (CATAPRES) 0.1 MG tablet, Take 1 tablet by mouth Every Night., Disp: 60 tablet, Rfl: 2  •  colchicine 0.6 MG tablet, Take 2 pills now, 1 pill in one hr.  Then Can take daily, Disp: 14 tablet, Rfl: 3  •  HYDROcodone-acetaminophen (NORCO)  MG per tablet, Take 1 tablet by mouth Every 6 (Six) Hours As Needed for Moderate Pain (4-6) or Severe Pain  (7-10) (Gout)., Disp: 9 tablet, Rfl: 0  •  levETIRAcetam (KEPPRA) 500 MG tablet, Take 1 tablet by mouth Every 12 (Twelve) Hours., Disp: 60 tablet, Rfl: 1  •  losartan (COZAAR) 100 MG tablet, Take 1 tablet by mouth Daily., Disp: 30 tablet, Rfl: 1  •  omeprazole (PRILOSEC) 10 MG capsule, Take 10 mg by mouth Daily., Disp: , Rfl:   •  raNITIdine (ZANTAC) 75 MG tablet, Take  "75 mg by mouth 2 (Two) Times a Day., Disp: , Rfl:   •  vardenafil (LEVITRA) 10 MG tablet, Take 1 tablet by mouth Daily As Needed for erectile dysfunction., Disp: 10 tablet, Rfl: 5  :  REVIEW OF SYMPTOMS: (Positives bolded)   General:  weight loss, fever, chills, night sweats, fatigue, appetite loss  HEENT:  blurry vision, eye discharge, dry eyes, decreased vision   Respiratory: shortness of breath, cough, hemoptysis, wheezing, pleurisy,   Cardiovascular:  chest pain, PND, palpitation, edema, orthopnea, syncope  Musckelo: Arthralgia, myalgia, muscle weakness, joint swelling, NSAID use  Skin: rash, pruritis, sores, nail changes, change in wart/mole, itching  Psychiatric: depression, anxiety, anti-depressants, insomnia, mood changes  \"All other systems reviewed and negative, except as listed above.”    OBJECTIVE:  Physical Exam   Constitutional: The patient is oriented to person, place, and time. The patient appears well-developed and well-nourished. No distress.   HEENT:   Head: Normocephalic and atraumatic. Document trach site  Right Ear: Tympanic membrane and external ear normal.   Left Ear: Tympanic membrane and external ear normal.   Nose:  . Boggy turbinates.  No epistaxis.  No foreign bodies.   Mouth/Throat: Normal dentition. No uvula swelling. No Posterior erythema present. No oropharyngeal exudate, posterior oropharyngeal edema or tonsillar abscesses.   Eyes: Conjunctivae and EOM are normal. Pupils are equal, round, and reactive to light.   Neck: Normal range of motion. No thyromegaly present. Healing trach site  Cardiovascular: Normal rate, regular rhythm, normal heart sounds and intact distal pulses.    CHEST/LUNG: Inspection- symmetric chest wall no pectus deformity. Decreased effort, no distress, no use of accessory muscles. Palpation- nontender sternum, ribline.  No abnormal pulsations. Auscultation- Breath sounds coarse throughout all lung fields, decreased effort.  Normal tracheal sounds, Normal " "bronchial sounds overlying sternum, Bronchovessicular sounds decreased and coarse between scapulae posteriorly and with vessicular breath sounds heard throughout periphery. Adventitious sounds- No wheezes, coarse rhonchi, no rales. No e/o consolidation.   Abdominal: Soft. Bowel sounds are normal. Exhibits no mass. There is no tenderness. There is no rebound and no guarding.   Musculoskeletal:   Has 1+ swelling of the whole left foot.  Has good ROM of ankle but complaints of pain in the toes and at the base of the foot.   Lymphadenopathy: No anterior/posterior cervical adenopathy.   Neurological: Alert and oriented to person, place, and time. No noted cranial nerve deficit. Moves all 4, symmetrical.   Skin: Skin is warm. No rash noted.   Psychiatric: Normal mood and affect. The behavior is normal. Judgment and thought content appear normal.     /70  Pulse 91  Temp 97.9 °F (36.6 °C)  Resp 16  Ht 67\" (170.2 cm)  Wt 198 lb (89.8 kg)  SpO2 98%  BMI 31.01 kg/m2    Assessment/Plan  Nishant was seen today for trach removed.    Diagnoses and all orders for this visit:    Left foot pain  -     XR Foot 3+ View Left    Essential hypertension        Continue all meds as prescribed          Hypomagnesemia         Take otc magnesium daily    An After Visit Summary was printed and given to the patient at discharge.    Return in about 1 month (around 9/18/2017).         Linda Hoffman, SUSHMA, APRN-BC  08/18/2017    "

## 2017-08-19 LAB
ALBUMIN SERPL-MCNC: 4.2 G/DL (ref 3.5–5)
ALBUMIN/GLOB SERPL: 1.4 G/DL (ref 1.1–2.5)
ALP SERPL-CCNC: 110 U/L (ref 24–120)
ALT SERPL-CCNC: 30 U/L (ref 0–54)
AST SERPL-CCNC: 18 U/L (ref 7–45)
BASOPHILS # BLD AUTO: 0.02 10*3/MM3 (ref 0–0.2)
BASOPHILS NFR BLD AUTO: 0.3 % (ref 0–2)
BILIRUB SERPL-MCNC: 0.5 MG/DL (ref 0.1–1)
BUN SERPL-MCNC: 12 MG/DL (ref 5–21)
BUN/CREAT SERPL: 10.7 (ref 7–25)
CALCIUM SERPL-MCNC: 9.1 MG/DL (ref 8.4–10.4)
CHLORIDE SERPL-SCNC: 108 MMOL/L (ref 98–110)
CO2 SERPL-SCNC: 18 MMOL/L (ref 24–31)
CREAT SERPL-MCNC: 1.12 MG/DL (ref 0.5–1.4)
EOSINOPHIL # BLD AUTO: 0.1 10*3/MM3 (ref 0–0.7)
EOSINOPHIL NFR BLD AUTO: 1.4 % (ref 0–4)
ERYTHROCYTE [DISTWIDTH] IN BLOOD BY AUTOMATED COUNT: 16.1 % (ref 12–15)
GLOBULIN SER CALC-MCNC: 3.1 GM/DL
GLUCOSE SERPL-MCNC: 99 MG/DL (ref 70–100)
HCT VFR BLD AUTO: 38.7 % (ref 40–52)
HGB BLD-MCNC: 12.7 G/DL (ref 14–18)
IMM GRANULOCYTES # BLD: 0.03 10*3/MM3 (ref 0–0.03)
IMM GRANULOCYTES NFR BLD: 0.4 % (ref 0–5)
LYMPHOCYTES # BLD AUTO: 2.13 10*3/MM3 (ref 0.72–4.86)
LYMPHOCYTES NFR BLD AUTO: 29.4 % (ref 15–45)
MAGNESIUM SERPL-MCNC: 1.8 MG/DL (ref 1.4–2.2)
MCH RBC QN AUTO: 28.7 PG (ref 28–32)
MCHC RBC AUTO-ENTMCNC: 32.8 G/DL (ref 33–36)
MCV RBC AUTO: 87.4 FL (ref 82–95)
MONOCYTES # BLD AUTO: 0.77 10*3/MM3 (ref 0.19–1.3)
MONOCYTES NFR BLD AUTO: 10.6 % (ref 4–12)
NEUTROPHILS # BLD AUTO: 4.19 10*3/MM3 (ref 1.87–8.4)
NEUTROPHILS NFR BLD AUTO: 57.9 % (ref 39–78)
PLATELET # BLD AUTO: 320 10*3/MM3 (ref 130–400)
POTASSIUM SERPL-SCNC: 3.8 MMOL/L (ref 3.5–5.3)
PROT SERPL-MCNC: 7.3 G/DL (ref 6.3–8.7)
RBC # BLD AUTO: 4.43 10*6/MM3 (ref 4.8–5.9)
SODIUM SERPL-SCNC: 144 MMOL/L (ref 135–145)
WBC # BLD AUTO: 7.24 10*3/MM3 (ref 4.8–10.8)

## 2017-08-23 ENCOUNTER — OFFICE VISIT (OUTPATIENT)
Dept: OTOLARYNGOLOGY | Facility: CLINIC | Age: 52
End: 2017-08-23

## 2017-08-23 VITALS
TEMPERATURE: 97 F | BODY MASS INDEX: 31.08 KG/M2 | DIASTOLIC BLOOD PRESSURE: 93 MMHG | SYSTOLIC BLOOD PRESSURE: 131 MMHG | HEART RATE: 80 BPM | WEIGHT: 198 LBS | HEIGHT: 67 IN | RESPIRATION RATE: 20 BRPM

## 2017-08-23 DIAGNOSIS — Z93.0 STATUS POST TRACHEOSTOMY (HCC): ICD-10-CM

## 2017-08-23 DIAGNOSIS — T78.3XXD ANGIO-EDEMA, SUBSEQUENT ENCOUNTER: Primary | ICD-10-CM

## 2017-08-23 PROCEDURE — 99213 OFFICE O/P EST LOW 20 MIN: CPT | Performed by: OTOLARYNGOLOGY

## 2017-08-23 NOTE — PROGRESS NOTES
PRIMARY CARE PROVIDER: Linda Hoffman, DNP, APRN  REFERRING PROVIDER: No ref. provider found    Chief Complaint   Patient presents with   • Follow-up     Trach Site       Subjective   History of Present Illness:  Nishant Savage is a  52 y.o. male who returns to the office 2 weeks following decannulation of his tracheostomy tube.  He is no longer placing dressings on this.  He has not noted any ear escape or whistling when he sneezed, cough, or spoken.  Denies any pain.  Denies shortness of breath.  His voice is normal.    Review of Systems:  Review of Systems   Constitutional: Negative for fatigue and fever.   HENT: Negative for trouble swallowing and voice change.    Respiratory: Negative for shortness of breath.    Skin: Positive for wound.       Past History:  Past Medical History:   Diagnosis Date   • Angio-edema 7/3/2017   • Anxiety    • Arthritis    • Clostridium difficile colitis    • Erectile dysfunction 10/6/2016   • Gout    • HCAP (healthcare-associated pneumonia) 7/11/2017   • Hyperlipidemia    • Malignant hypertension    • MSSA (methicillin susceptible Staphylococcus aureus) infection 7/31/2017   • Obstructive sleep apnea    • Seizures 7/4/2017     Past Surgical History:   Procedure Laterality Date   • TRACHEOSTOMY N/A 7/12/2017    Procedure: TRACHEOSTOMY WITH TRACHEOSCOPY;  Surgeon: Jairon Pierson MD;  Location: [facility] OR;  Service:      Family History   Problem Relation Age of Onset   • Hypertension Mother    • Diabetes Mother    • Heart disease Father    • Hypertension Father    • No Known Problems Daughter    • No Known Problems Son    • Diabetes Maternal Grandmother    • No Known Problems Maternal Grandfather    • No Known Problems Paternal Grandmother    • Heart attack Paternal Grandfather    • Kidney disease Sister      Social History   Substance Use Topics   • Smoking status: Former Smoker     Packs/day: 0.33     Years: 30.00     Types: Cigarettes     Quit date: 7/3/2017   • Smokeless  tobacco: Never Used   • Alcohol use No      Comment: PER DAY FOR 30 YEARS; Last drink July 3, 2017     Allergies:  Lipitor [atorvastatin] and Lisinopril    Current Outpatient Prescriptions:   •  amLODIPine (NORVASC) 10 MG tablet, Take 1 tablet by mouth Daily., Disp: 30 tablet, Rfl: 1  •  carvedilol (COREG) 25 MG tablet, Take 1 tablet by mouth Every 12 (Twelve) Hours., Disp: 60 tablet, Rfl: 1  •  CloNIDine (CATAPRES) 0.1 MG tablet, Take 1 tablet by mouth Every Night., Disp: 60 tablet, Rfl: 2  •  colchicine 0.6 MG tablet, Take 2 pills now, 1 pill in one hr.  Then Can take daily, Disp: 14 tablet, Rfl: 3  •  levETIRAcetam (KEPPRA) 500 MG tablet, Take 1 tablet by mouth Every 12 (Twelve) Hours., Disp: 60 tablet, Rfl: 1  •  losartan (COZAAR) 100 MG tablet, Take 1 tablet by mouth Daily., Disp: 30 tablet, Rfl: 1  •  omeprazole (PRILOSEC) 10 MG capsule, Take 10 mg by mouth Daily., Disp: , Rfl:   •  raNITIdine (ZANTAC) 75 MG tablet, Take 75 mg by mouth 2 (Two) Times a Day., Disp: , Rfl:   •  vardenafil (LEVITRA) 10 MG tablet, Take 1 tablet by mouth Daily As Needed for erectile dysfunction., Disp: 10 tablet, Rfl: 5      Objective     Vital Signs:  Temp:  [97 °F (36.1 °C)] 97 °F (36.1 °C)  Heart Rate:  [80] 80  Resp:  [20] 20  BP: (131)/(93) 131/93    Physical Exam:  Physical Exam   Constitutional: He is oriented to person, place, and time. He appears well-developed and well-nourished. He is cooperative. No distress.   HENT:   Head: Normocephalic and atraumatic.   Right Ear: External ear normal.   Left Ear: External ear normal.   Nose: Nose normal.   Mouth/Throat: Oropharynx is clear and moist.   Eyes: Conjunctivae and EOM are normal. Pupils are equal, round, and reactive to light. Right eye exhibits no discharge. Left eye exhibits no discharge. No scleral icterus.   Neck: Normal range of motion. Neck supple. No JVD present. No tracheal deviation present. No thyromegaly present.       Pulmonary/Chest: Effort normal. No stridor.    Musculoskeletal: Normal range of motion. He exhibits no edema or deformity.   Lymphadenopathy:     He has no cervical adenopathy.   Neurological: He is alert and oriented to person, place, and time. He has normal strength. No cranial nerve deficit. Coordination normal.   Skin: Skin is warm and dry. No rash noted. He is not diaphoretic. No erythema. No pallor.   Psychiatric: He has a normal mood and affect. His speech is normal and behavior is normal. Judgment and thought content normal. Cognition and memory are normal.   Nursing note and vitals reviewed.      Assessment   Assessment:  1. Angio-edema, subsequent encounter    2. Status post tracheostomy        Plan   Plan:      Fistula has closed.  Follow-up as needed      My findings and recommendations were discussed and questions were answered.     Jairon Pierson MD  08/23/17  1:15 PM

## 2017-08-25 ENCOUNTER — HOSPITAL ENCOUNTER (OUTPATIENT)
Dept: MRI IMAGING | Facility: HOSPITAL | Age: 52
End: 2017-08-25

## 2017-08-28 ENCOUNTER — TELEPHONE (OUTPATIENT)
Dept: FAMILY MEDICINE CLINIC | Facility: CLINIC | Age: 52
End: 2017-08-28

## 2017-08-28 NOTE — TELEPHONE ENCOUNTER
Spoke to patient's mother Ivonne with lab results, she states that he is now having issues with his legs swelling.

## 2017-08-29 ENCOUNTER — HOSPITAL ENCOUNTER (OUTPATIENT)
Dept: MRI IMAGING | Facility: HOSPITAL | Age: 52
Discharge: HOME | End: 2017-08-29
Admitting: CLINICAL NURSE SPECIALIST

## 2017-08-29 DIAGNOSIS — I15.9 SECONDARY HYPERTENSION: ICD-10-CM

## 2017-08-29 DIAGNOSIS — R56.9 SEIZURES (HCC): ICD-10-CM

## 2017-08-29 DIAGNOSIS — E78.5 HYPERLIPIDEMIA, UNSPECIFIED HYPERLIPIDEMIA TYPE: ICD-10-CM

## 2017-08-29 DIAGNOSIS — I67.83 POSTERIOR REVERSIBLE ENCEPHALOPATHY SYNDROME: ICD-10-CM

## 2017-08-29 PROCEDURE — 70553 MRI BRAIN STEM W/O & W/DYE: CPT

## 2017-08-29 PROCEDURE — A9577 INJ MULTIHANCE: HCPCS | Performed by: CLINICAL NURSE SPECIALIST

## 2017-08-29 PROCEDURE — 0 GADOBENATE DIMEGLUMINE 529 MG/ML SOLUTION: Performed by: CLINICAL NURSE SPECIALIST

## 2017-08-29 RX ADMIN — GADOBENATE DIMEGLUMINE 10 ML: 529 INJECTION, SOLUTION INTRAVENOUS at 17:00

## 2017-08-29 NOTE — TELEPHONE ENCOUNTER
I HAVE CALLED AND LEFT THE PATIENT A VOICEMAIL TO GIVE US A CALL BACK TO GET SCHEDULED. I WILL TRY BACK IN JUST A LITTLE BIT.

## 2017-09-01 ENCOUNTER — TELEPHONE (OUTPATIENT)
Dept: NEUROLOGY | Facility: CLINIC | Age: 52
End: 2017-09-01

## 2017-09-01 NOTE — TELEPHONE ENCOUNTER
I called Mrs Savage and spoke with her about Lukasz's Keppra. She is very concerned that he will not be weaned off of it before he is seen by Chichi Gage 10/5/17. I did let her know that Chichi is out of the office and will address the Keppra when she returns. I did tell Mrs Savage that I will call her with instructions after chichi's return on 9/11/17. She did voice understanding.

## 2017-09-05 ENCOUNTER — OFFICE VISIT (OUTPATIENT)
Dept: FAMILY MEDICINE CLINIC | Facility: CLINIC | Age: 52
End: 2017-09-05

## 2017-09-05 VITALS
SYSTOLIC BLOOD PRESSURE: 124 MMHG | BODY MASS INDEX: 32.71 KG/M2 | RESPIRATION RATE: 16 BRPM | TEMPERATURE: 98.7 F | DIASTOLIC BLOOD PRESSURE: 70 MMHG | HEART RATE: 87 BPM | OXYGEN SATURATION: 96 % | WEIGHT: 208.4 LBS | HEIGHT: 67 IN

## 2017-09-05 DIAGNOSIS — M79.605 BILATERAL LEG AND FOOT PAIN: ICD-10-CM

## 2017-09-05 DIAGNOSIS — M79.671 BILATERAL LEG AND FOOT PAIN: ICD-10-CM

## 2017-09-05 DIAGNOSIS — R60.0 LOCALIZED EDEMA: Primary | ICD-10-CM

## 2017-09-05 DIAGNOSIS — M79.672 BILATERAL LEG AND FOOT PAIN: ICD-10-CM

## 2017-09-05 DIAGNOSIS — M79.604 BILATERAL LEG AND FOOT PAIN: ICD-10-CM

## 2017-09-05 PROCEDURE — 99214 OFFICE O/P EST MOD 30 MIN: CPT | Performed by: NURSE PRACTITIONER

## 2017-09-05 RX ORDER — POTASSIUM CHLORIDE 750 MG/1
10 TABLET, EXTENDED RELEASE ORAL DAILY
Qty: 30 TABLET | Refills: 0 | Status: SHIPPED | OUTPATIENT
Start: 2017-09-05 | End: 2017-10-05 | Stop reason: SDUPTHER

## 2017-09-05 RX ORDER — FUROSEMIDE 20 MG/1
20 TABLET ORAL DAILY
Qty: 30 TABLET | Refills: 0 | Status: SHIPPED | OUTPATIENT
Start: 2017-09-05 | End: 2017-10-05 | Stop reason: SDUPTHER

## 2017-09-05 RX ORDER — IBUPROFEN 600 MG/1
600 TABLET ORAL EVERY 6 HOURS PRN
Qty: 90 TABLET | Refills: 1 | Status: SHIPPED | OUTPATIENT
Start: 2017-09-05 | End: 2018-03-07

## 2017-09-05 NOTE — PROGRESS NOTES
Chief Complaint   Patient presents with   • Leg Swelling     and feet, started August 31. Left leg swells worse      Allergies   Allergen Reactions   • Lipitor [Atorvastatin] Rash   • Lisinopril Angioedema     HPI:  Lukasz Savage is a 52 y.o. male presents today with 3+ edema bilateral extremities, says that his feet are down in the mornings but as he is up during the day they swell, then they get tight and hurt.  .  (He was recently in the hospital for seizure activity and intoxication, tongue swelling, and posterioir reversible encephalopathy syndrome.  He was admitted to ICU, was placed on Keppra, and a vent. He had difficulties with alcohol withdrawal, and complications of ventilator associated pneumonia, and cdiff. He had to have a tracheostomy placement to wean from ventilatory support.  He rebounded very nicely)  Rates pain 8/10.  Chronic problems include HTN stable with amlodipine, losartan, coreg, and clonidine, gout stable with colchicine, GERD stable with omeprazole, ED stable with vardenafil.      Past Medical History:   Diagnosis Date   • Angio-edema 7/3/2017   • Anxiety    • Arthritis    • Clostridium difficile colitis    • Erectile dysfunction 10/6/2016   • Gout    • HCAP (healthcare-associated pneumonia) 7/11/2017   • Hyperlipidemia    • Malignant hypertension    • MSSA (methicillin susceptible Staphylococcus aureus) infection 7/31/2017   • Obstructive sleep apnea    • Seizures 7/4/2017     Past Surgical History:   Procedure Laterality Date   • TRACHEOSTOMY N/A 7/12/2017    Procedure: TRACHEOSTOMY WITH TRACHEOSCOPY;  Surgeon: Jairon Pierson MD;  Location: [facility];  Service:      Social History     Social History   • Marital status: Single     Spouse name: N/A   • Number of children: N/A   • Years of education: N/A     Social History Main Topics   • Smoking status: Former Smoker     Packs/day: 0.33     Years: 30.00     Types: Cigarettes     Quit date: 7/3/2017   • Smokeless tobacco: Never  Used   • Alcohol use No      Comment: PER DAY FOR 30 YEARS; Last drink July 3, 2017   • Drug use: No   • Sexual activity: Defer     Other Topics Concern   • None     Social History Narrative     Family History   Problem Relation Age of Onset   • Hypertension Mother    • Diabetes Mother    • Heart disease Father    • Hypertension Father    • No Known Problems Daughter    • No Known Problems Son    • Diabetes Maternal Grandmother    • No Known Problems Maternal Grandfather    • No Known Problems Paternal Grandmother    • Heart attack Paternal Grandfather    • Kidney disease Sister        Current Outpatient Prescriptions on File Prior to Visit   Medication Sig Dispense Refill   • amLODIPine (NORVASC) 10 MG tablet Take 1 tablet by mouth Daily. 30 tablet 1   • carvedilol (COREG) 25 MG tablet Take 1 tablet by mouth Every 12 (Twelve) Hours. 60 tablet 1   • CloNIDine (CATAPRES) 0.1 MG tablet Take 1 tablet by mouth Every Night. 60 tablet 2   • colchicine 0.6 MG tablet Take 2 pills now, 1 pill in one hr.  Then Can take daily 14 tablet 3   • levETIRAcetam (KEPPRA) 500 MG tablet Take 1 tablet by mouth Every 12 (Twelve) Hours. 60 tablet 1   • losartan (COZAAR) 100 MG tablet Take 1 tablet by mouth Daily. 30 tablet 1   • omeprazole (PRILOSEC) 10 MG capsule Take 10 mg by mouth Daily.     • raNITIdine (ZANTAC) 75 MG tablet Take 75 mg by mouth 2 (Two) Times a Day.     • vardenafil (LEVITRA) 10 MG tablet Take 1 tablet by mouth Daily As Needed for erectile dysfunction. 10 tablet 5     No current facility-administered medications on file prior to visit.         REVIEW OF SYMPTOMS: (Positives bolded)  General:  weight loss, fever, chills, night sweats, fatigue, appetite loss  Respiratory: shortness of breath, cough, hemoptysis, wheezing, pleurisy,   Cardiovascular:  chest pain, PND, palpitation, edema, orthopnea, syncope, swelling of extremities  Gastro: Nausea, vomiting, diarrhea, hematemesis, abdominal pain, constipation  Genito:  "hematuria, dysuria, glycosuria, hesitancy, frequency, incontinence  Musckelo: Arthralgia, myalgia, muscle weakness, joint swelling, NSAID use  Skin: rash, pruritis, sores, nail changes, skin thickening, change in wart/mole, itching, rash, new lesions, pruritus, nail changes  Neuro:  Migraine, numbness, ataxia, tremor, vertigo, weakness, memory loss,  \"All other systems reviewed and negative, except as listed above.”      OBJECTIVE:  Constitutional:  Appearance-No acute distress, Consistent with stated age. Orientation- Oriented x 3, alert Posture-Not doubled over. Gait-Normal pace, normal arm movement. Posture- Normal Build and Nutrition-Well developed and well nourished.  General- Patient is pleasant and cooperative with the interview and exam.    Integumentary: General-No rashes, ulcers or lesions. No edema.  Palpation- Normal skin moisture/turgor. Skin is warm to touch, no increased warmth. Capillary refill is normal bilateral Upper and lower extremity. Has 3+ edema in lower extremities bilaterally.   Faint pedal pulse most likely due to edema.        Head/Neck: Head- normocephalic and atraumatic.  Neck- without visible/palpable lumps or pulsations.  Palpation- No bony tenderness about head/neck along frontal, occiptial, temporal, parietal, mastoid, jawline, zygoma, orbit or any other location.  NO temporal artery tenderness. No TMJ tenderness. Normal cervical ROM.   Neck Supple.  Thyroid-No thyromegaly, no nodules    Eye: Bilaterally PERRLA, EOMI.  No discharge.  Upper and lower eyelids are normal. Sclera/conjunctiva normal without discharge. Cornea is normal and clear. Lens is normal.  Eyeball appears normal. No ciliary flushing, no conjunctival injection.    ENMT:  Pinna- normal without tenderness or erythema.  External auditory canal Left- normal without erythema or discharge, no excessive cerumen. External auditory canal Right-normal without erythema or discharge, no excessive cerumen. TM left- Grey/pearly, " normal light reflex and anatomy TM Right- Grey/pearly, normal light reflex and anatomy Hearing Assessment-normal to conversational speech at 2-5 feet.  Nose and sinus-No sinus tenderness along frontal/maxillary region. External appearance normal and midline. Nares- bilateral quiet airflow, no discharge. Nasal mucosa- No bleeding noted and no ulcerations observed. Pink, moist. Turbinates non boggy. Lips- normal color, moist without cracks/lesions Oral Cavity/Palate- hard/soft palate intact without lesions, oral mucosa pink and moist. Dentition assessed and discussed appropriate oral care. Tongue normal midline.  Oropharynx- no pharyngeal erythema, Uvula midline. No post nasal drip. No exudate. Salivary glands- Non tender to palpation    CHEST/LUNG: Inspection- symmetric chest wall no pectus deformity. Normal effort, no distress, no use of accessory muscles. Palpation- nontender sternum, ribline.  No abnormal pulsations. Auscultation- Breath sounds normal throughout all lung fields.  Normal tracheal sounds, Normal bronchial sounds overlying sternum, Bronchovessicular sounds normal between scapulae posteriorly, Normal vessicular breath sounds heard throughout periphery. Lungs are clear today. Adventitious sounds- No wheezes, rales, rhonchi.     CARDIOVASCULAR:  Carotid artery- normal, no bruits or abnormal pulsations. Jugular vein- no pulsations. Palpation/Percussion- Normal PMI, no palpable thrill  Auscultation- Regular rate and rhythm. No murmur noted in sitting, supine positions. Extremities- no digital clubbing, cyanosis, edema, increased warmth.    ABDOMEN: Inspection- normal and no visible pulsations. Normal contour. Auscultation- Bowel sounds normal, no abdominal bruits. Palpation/Percussion- soft, non-tender, no rebound tenderness, no rigidity (guarding), no jar tenderness, no masses.  Liver-no hepatomegaly, Spleen - no splenomegaly, Hernias- none. Rectal not examined.     Peripheral Vascular: Upper extremity  Left- Normal temperature with pink nailbeds and no ulcerations.  Upper extremity Right- Normal temperature with pink nailbeds and no ulcerations.  Lower extremity- Normal temperature with pink nailbeds, 3+ edema and no ulcerations.  Pedal hair intact.  Normal capillary refill.     Neuropsych: Oriented- Person, place, time. (AAOx3), Mood/affect- normal and congruent. Able to articulate well. Speech-Normal speech, normal rate, normal tone, normal use of language, volume and coherence.  Thought content- normal with ability to perform basic computations and apply abstract thought/reason. Associations- intact, no SI/HI, no hallucinations, delusions, obsessions.  Judgment/insight- Appropriate. Memory-Recall intact, remote and recent memory intact. Knowledge- Age appropriate fund of knowledge, concentration and attention span normal.    Lymphatic: Head/Neck- normal size and non tender to palpation. Axillary- Head and neck LN are normal size and non tender to palpation. Femoral and Inguinal- normal size and non tender to palpation.      Assessment/Plan:  Lukasz was seen today for leg swelling.    Diagnoses and all orders for this visit:    Localized edema  -     furosemide (LASIX) 20 MG tablet; Take 1 tablet by mouth Daily.  -     potassium chloride (K-DUR,KLOR-CON) 10 MEQ CR tablet; Take 1 tablet by mouth Daily.  -     Compression Full Lower Body Stockings    Bilateral leg and foot pain  -     ibuprofen (ADVIL,MOTRIN) 600 MG tablet; Take 1 tablet by mouth Every 6 (Six) Hours As Needed for Mild Pain .    ELEVATE FEET  EAT POTASSIUM RICH FOODS    Return in about 1 week (around 9/12/2017).    Linda Hoffman, DNP, APRN-BC    09/05/2017

## 2017-09-05 NOTE — PATIENT INSTRUCTIONS
Edema  Edema is an abnormal buildup of fluids in your body tissues. Edema is somewhat dependent on gravity to pull the fluid to the lowest place in your body. That makes the condition more common in the legs and thighs (lower extremities). Painless swelling of the feet and ankles is common and becomes more likely as you get older. It is also common in looser tissues, like around your eyes.   When the affected area is squeezed, the fluid may move out of that spot and leave a dent for a few moments. This dent is called pitting.   CAUSES   There are many possible causes of edema. Eating too much salt and being on your feet or sitting for a long time can cause edema in your legs and ankles. Hot weather may make edema worse. Common medical causes of edema include:  · Heart failure.  · Liver disease.  · Kidney disease.  · Weak blood vessels in your legs.  · Cancer.  · An injury.  · Pregnancy.  · Some medications.  · Obesity.   SYMPTOMS   Edema is usually painless. Your skin may look swollen or shiny.   DIAGNOSIS   Your health care provider may be able to diagnose edema by asking about your medical history and doing a physical exam. You may need to have tests such as X-rays, an electrocardiogram, or blood tests to check for medical conditions that may cause edema.   TREATMENT   Edema treatment depends on the cause. If you have heart, liver, or kidney disease, you need the treatment appropriate for these conditions. General treatment may include:  · Elevation of the affected body part above the level of your heart.  · Compression of the affected body part. Pressure from elastic bandages or support stockings squeezes the tissues and forces fluid back into the blood vessels. This keeps fluid from entering the tissues.  · Restriction of fluid and salt intake.  · Use of a water pill (diuretic). These medications are appropriate only for some types of edema. They pull fluid out of your body and make you urinate more often. This  gets rid of fluid and reduces swelling, but diuretics can have side effects. Only use diuretics as directed by your health care provider.  HOME CARE INSTRUCTIONS   · Keep the affected body part above the level of your heart when you are lying down.    · Do not sit still or stand for prolonged periods.    · Do not put anything directly under your knees when lying down.  · Do not wear constricting clothing or garters on your upper legs.    · Exercise your legs to work the fluid back into your blood vessels. This may help the swelling go down.    · Wear elastic bandages or support stockings to reduce ankle swelling as directed by your health care provider.    · Eat a low-salt diet to reduce fluid if your health care provider recommends it.    · Only take medicines as directed by your health care provider.   SEEK MEDICAL CARE IF:   · Your edema is not responding to treatment.  · You have heart, liver, or kidney disease and notice symptoms of edema.  · You have edema in your legs that does not improve after elevating them.    · You have sudden and unexplained weight gain.  SEEK IMMEDIATE MEDICAL CARE IF:   · You develop shortness of breath or chest pain.    · You cannot breathe when you lie down.  · You develop pain, redness, or warmth in the swollen areas.    · You have heart, liver, or kidney disease and suddenly get edema.  · You have a fever and your symptoms suddenly get worse.  MAKE SURE YOU:   · Understand these instructions.  · Will watch your condition.  · Will get help right away if you are not doing well or get worse.     This information is not intended to replace advice given to you by your health care provider. Make sure you discuss any questions you have with your health care provider.     Document Released: 12/18/2006 Document Revised: 04/10/2017 Document Reviewed: 10/10/2014  "[employer] Interactive Patient Education ©2017 "[employer] Inc.

## 2017-09-08 DIAGNOSIS — I15.9 SECONDARY HYPERTENSION: Primary | ICD-10-CM

## 2017-09-08 RX ORDER — LOSARTAN POTASSIUM 100 MG/1
100 TABLET ORAL
Qty: 30 TABLET | Refills: 1 | Status: SHIPPED | OUTPATIENT
Start: 2017-09-08 | End: 2017-11-08 | Stop reason: SDUPTHER

## 2017-09-11 ENCOUNTER — OFFICE VISIT (OUTPATIENT)
Dept: FAMILY MEDICINE CLINIC | Facility: CLINIC | Age: 52
End: 2017-09-11

## 2017-09-11 ENCOUNTER — OFFICE VISIT (OUTPATIENT)
Dept: VASCULAR SURGERY | Facility: CLINIC | Age: 52
End: 2017-09-11

## 2017-09-11 VITALS
DIASTOLIC BLOOD PRESSURE: 70 MMHG | BODY MASS INDEX: 31.14 KG/M2 | WEIGHT: 198.4 LBS | TEMPERATURE: 98 F | HEIGHT: 67 IN | HEART RATE: 87 BPM | SYSTOLIC BLOOD PRESSURE: 108 MMHG | OXYGEN SATURATION: 96 %

## 2017-09-11 VITALS
DIASTOLIC BLOOD PRESSURE: 76 MMHG | HEART RATE: 76 BPM | BODY MASS INDEX: 30.01 KG/M2 | SYSTOLIC BLOOD PRESSURE: 118 MMHG | WEIGHT: 198 LBS | HEIGHT: 68 IN

## 2017-09-11 DIAGNOSIS — M25.572 ACUTE BILATERAL ANKLE PAIN: Primary | ICD-10-CM

## 2017-09-11 DIAGNOSIS — M25.571 ACUTE BILATERAL ANKLE PAIN: Primary | ICD-10-CM

## 2017-09-11 DIAGNOSIS — L08.9 SKIN INFECTION: ICD-10-CM

## 2017-09-11 DIAGNOSIS — R60.9 EDEMA, UNSPECIFIED TYPE: ICD-10-CM

## 2017-09-11 DIAGNOSIS — R56.9 SEIZURE (HCC): ICD-10-CM

## 2017-09-11 DIAGNOSIS — M79.89 PAIN AND SWELLING OF TOE, RIGHT: ICD-10-CM

## 2017-09-11 DIAGNOSIS — I10 ESSENTIAL HYPERTENSION: ICD-10-CM

## 2017-09-11 DIAGNOSIS — I15.9 SECONDARY HYPERTENSION: ICD-10-CM

## 2017-09-11 DIAGNOSIS — E78.5 HYPERLIPIDEMIA, UNSPECIFIED HYPERLIPIDEMIA TYPE: ICD-10-CM

## 2017-09-11 DIAGNOSIS — I73.9 CLAUDICATION (HCC): Primary | ICD-10-CM

## 2017-09-11 DIAGNOSIS — M79.674 PAIN AND SWELLING OF TOE, RIGHT: ICD-10-CM

## 2017-09-11 PROCEDURE — 99214 OFFICE O/P EST MOD 30 MIN: CPT | Performed by: NURSE PRACTITIONER

## 2017-09-11 RX ORDER — CLINDAMYCIN HYDROCHLORIDE 300 MG/1
300 CAPSULE ORAL 3 TIMES DAILY
Qty: 40 CAPSULE | Refills: 0 | Status: SHIPPED | OUTPATIENT
Start: 2017-09-11 | End: 2017-09-29

## 2017-09-11 RX ORDER — LEVETIRACETAM 500 MG/1
500 TABLET ORAL EVERY 12 HOURS SCHEDULED
Qty: 60 TABLET | Refills: 1 | Status: SHIPPED | OUTPATIENT
Start: 2017-09-11 | End: 2017-09-29 | Stop reason: ALTCHOICE

## 2017-09-11 RX ORDER — CARVEDILOL 25 MG/1
25 TABLET ORAL EVERY 12 HOURS SCHEDULED
Qty: 60 TABLET | Refills: 1 | Status: SHIPPED | OUTPATIENT
Start: 2017-09-11 | End: 2017-11-08 | Stop reason: SDUPTHER

## 2017-09-11 RX ORDER — AMLODIPINE BESYLATE 10 MG/1
10 TABLET ORAL
Qty: 30 TABLET | Refills: 1 | Status: SHIPPED | OUTPATIENT
Start: 2017-09-11 | End: 2017-10-11

## 2017-09-11 NOTE — PROGRESS NOTES
Chief Complaint   Patient presents with   • Edema     bilateral legs legs improved feet still swollen and painful     Allergies   Allergen Reactions   • Lipitor [Atorvastatin] Rash   • Lisinopril Angioedema     HPI:  Lukasz Savage is a 52 y.o. male presents today for follow up for edema bilateral lower extremities.  The edema is improved from last visit in the legs still has some edema in the feet.  His right 2nd digit toe is painful. Also needs refills on meds.      He had a hospitalization early July for seizure activity, intoxication, tongue swelling and posterior reversible encephalopathy syndrome.   His hospital stay included admission to ICU, trach being placed and put on ventilatory support.  He had difficulties with alcohol withdrawal, and complications of ventilator associated pneumonia, and cdiff. He rebounded very nicely.  His BP meds were adjusted and he was to continue amlodipine, clonidine to oral, continue Keppra for 6-8 weeks, put on broad spectrum antibiotics for potential pneumonia, Staph and MSSA in suptum. He was placed on indomethacin and colchicine for gout.  He did well with speech therapy and occupational therapy.  He was discharged and has been doing very well at home with the except of a swollen painful foot. Chronic problems include HTN stable with amlodipine, losartan, coreg, and clonidine, gout stable with colchicine, GERD stable with omeprazole, ED stable with vardenafil.      Past Medical History:   Diagnosis Date   • Angio-edema 7/3/2017   • Anxiety    • Arthritis    • Clostridium difficile colitis    • Erectile dysfunction 10/6/2016   • Gout    • HCAP (healthcare-associated pneumonia) 7/11/2017   • Hyperlipidemia    • Malignant hypertension    • MSSA (methicillin susceptible Staphylococcus aureus) infection 7/31/2017   • Obstructive sleep apnea    • Seizures 7/4/2017     Past Surgical History:   Procedure Laterality Date   • TRACHEOSTOMY N/A 7/12/2017    Procedure: TRACHEOSTOMY  WITH TRACHEOSCOPY;  Surgeon: Jairon Pierson MD;  Location: [facility];  Service:      Social History     Social History   • Marital status: Single     Spouse name: N/A   • Number of children: N/A   • Years of education: N/A     Social History Main Topics   • Smoking status: Former Smoker     Packs/day: 0.33     Years: 30.00     Types: Cigarettes     Quit date: 7/3/2017   • Smokeless tobacco: Never Used   • Alcohol use No      Comment: PER DAY FOR 30 YEARS; Last drink July 3, 2017   • Drug use: No   • Sexual activity: Defer     Other Topics Concern   • None     Social History Narrative     Family History   Problem Relation Age of Onset   • Hypertension Mother    • Diabetes Mother    • Heart disease Father    • Hypertension Father    • No Known Problems Daughter    • No Known Problems Son    • Diabetes Maternal Grandmother    • No Known Problems Maternal Grandfather    • No Known Problems Paternal Grandmother    • Heart attack Paternal Grandfather    • Kidney disease Sister        Current Outpatient Prescriptions on File Prior to Visit   Medication Sig Dispense Refill   • amLODIPine (NORVASC) 10 MG tablet Take 1 tablet by mouth Daily. 30 tablet 1   • carvedilol (COREG) 25 MG tablet Take 1 tablet by mouth Every 12 (Twelve) Hours. 60 tablet 1   • CloNIDine (CATAPRES) 0.1 MG tablet Take 1 tablet by mouth Every Night. 60 tablet 2   • colchicine 0.6 MG tablet Take 2 pills now, 1 pill in one hr.  Then Can take daily 14 tablet 3   • furosemide (LASIX) 20 MG tablet Take 1 tablet by mouth Daily. 30 tablet 0   • ibuprofen (ADVIL,MOTRIN) 600 MG tablet Take 1 tablet by mouth Every 6 (Six) Hours As Needed for Mild Pain . 90 tablet 1   • levETIRAcetam (KEPPRA) 500 MG tablet Take 1 tablet by mouth Every 12 (Twelve) Hours. 60 tablet 1   • losartan (COZAAR) 100 MG tablet Take 1 tablet by mouth Daily. 30 tablet 1   • omeprazole (PRILOSEC) 10 MG capsule Take 10 mg by mouth Daily.     • potassium chloride (K-DUR,KLOR-CON) 10 MEQ CR  "tablet Take 1 tablet by mouth Daily. 30 tablet 0   • raNITIdine (ZANTAC) 75 MG tablet Take 75 mg by mouth 2 (Two) Times a Day.     • vardenafil (LEVITRA) 10 MG tablet Take 1 tablet by mouth Daily As Needed for erectile dysfunction. 10 tablet 5     No current facility-administered medications on file prior to visit.         REVIEW OF SYMPTOMS: (Positives bolded)  General:  weight loss, fever, chills, night sweats, fatigue, appetite loss  Respiratory: shortness of breath, cough, hemoptysis, wheezing, pleurisy,   Cardiovascular:  chest pain, PND, palpitation, edema, orthopnea, syncope, swelling of extremities  Gastro: Nausea, vomiting, diarrhea, hematemesis, abdominal pain, constipation  Genito: hematuria, dysuria, glycosuria, hesitancy, frequency, incontinence  Musckelo: Arthralgia, myalgia, muscle weakness, joint swelling, NSAID use  Skin: rash, pruritis, sores, nail changes, skin thickening, change in wart/mole,   Neuro:  Migraine, numbness, ataxia, tremor, vertigo, weakness, memory loss,  \"All other systems reviewed and negative, except as listed above.”      OBJECTIVE:  Constitutional:  Appearance-No acute distress, Consistent with stated age. Orientation- Oriented x 3, alert Posture-Not doubled over. Gait-Limping with a cane. Posture- Normal Build and Nutrition-Well developed and well nourished.  General- Patient is pleasant and cooperative with the interview and exam.    Integumentary: General-No rashes, ulcers or lesions. No edema.  Palpation- Normal skin moisture/turgor. Skin is warm to touch, no increased warmth. Capillary refill is normal bilateral Upper and lower extremity.  SEE FOOT EXAM    Head/Neck: Head- normocephalic and atraumatic.  Neck- without visible/palpable lumps or pulsations.  Palpation- No bony tenderness about head/neck along frontal, occiptial, temporal, parietal, mastoid, jawline, zygoma, orbit or any other location.  NO temporal artery tenderness. No TMJ tenderness. Normal cervical ROM. "   Neck Supple.  Thyroid-No thyromegaly, no nodules    CHEST/LUNG: Inspection- symmetric chest wall no pectus deformity. Normal effort, no distress, no use of accessory muscles. Palpation- nontender sternum, ribline.  No abnormal pulsations. Auscultation- Breath sounds normal throughout all lung fields.  Normal tracheal sounds, Normal bronchial sounds overlying sternum, Bronchovessicular sounds normal between scapulae posteriorly, Normal vessicular breath sounds heard throughout periphery. Lungs are clear today. Adventitious sounds- No wheezes, rales, rhonchi.     CARDIOVASCULAR:  Carotid artery- normal, no bruits or abnormal pulsations. Jugular vein- no pulsations. Palpation/Percussion- Normal PMI, no palpable thrill  Auscultation- Regular rate and rhythm. No murmur noted in sitting, supine positions. Extremities- no digital clubbing, cyanosis, edema, increased warmth.    ABDOMEN: Inspection- normal and no visible pulsations. Normal contour. Auscultation- Bowel sounds normal, no abdominal bruits. Palpation/Percussion- soft, non-tender, no rebound tenderness, no rigidity (guarding), no jar tenderness, no masses.  Liver-no hepatomegaly, Spleen - no splenomegaly, Hernias- none. Rectal not examined.     Peripheral Vascular: Upper extremity Left- Normal temperature with pink nailbeds and no ulcerations.  Upper extremity Right- Normal temperature with pink nailbeds and no ulcerations.  Lower extremity- Normal temperature with pink nailbeds and no ulcerations. DP pulses 2+ bilaterally.  Pedal hair intact.  Normal capillary refill. Edema- No edema.    Neurological: General- Moves all 4 extremities symmetrically. Symmetrical face and body posture. Cranial nerves- individually evaluated II-XII and intact. PERRLA, Normal EOMI, visual/special senses appear intact, Face is symmetrical and normal sensation/movement, normal tongue, normal strength/posture of neck musculature. Balance- Romberg intact.  Reflexes- ntact with DTR 2+  patellar, Achilles, bicep, brachial,tricep. Ankle clonus normal with 2 beats.  Strength- 5/5 bilateral UE and LE. Soft touch- intact bilateral UE and LE.  Temperature sensation- intact bilateral UE and LE. Cerebellar testing-Rapid alternating movements intact.  Heel shin intact. Able to walk normal gait, normal heel toe walking. Neck- supple.      Foot exam:   RIGHT: 2+ edema, redness, painful, improved from last visit.   The 2nd toe is tender to touch and fire engine red.  Has decreased sensation and pain, has faint pedal and dorsal pedis pulses.        LEFT: 2+ edema and redness, painful improved from last visit.      Neuropsych: Oriented- Person, place, time. (AAOx3), Mood/affect- normal and congruent. Able to articulate well. Speech-Normal speech, normal rate, normal tone, normal use of language, volume and coherence.  Thought content- normal with ability to perform basic computations and apply abstract thought/reason. Associations- intact, no SI/HI, no hallucinations, delusions, obsessions.  Judgment/insight- Appropriate. Memory-Recall intact, remote and recent memory intact. Knowledge- Age appropriate fund of knowledge, concentration and attention span normal.    Lymphatic: Head/Neck- normal size and non tender to palpation. Axillary- Head and neck LN are normal size and non tender to palpation. Femoral and Inguinal- normal size and non tender to palpation.      Assessment/Plan:  Lukasz was seen today for edema.    Diagnoses and all orders for this visit:    Claudication  Edema, unspecified type  -     Ambulatory Referral to Vascular Surgery    Essential hypertension  -     amLODIPine (NORVASC) 10 MG tablet; Take 1 tablet by mouth Daily.  -     carvedilol (COREG) 25 MG tablet; Take 1 tablet by mouth Every 12 (Twelve) Hours.    Seizure  -     levETIRAcetam (KEPPRA) 500 MG tablet; Take 1 tablet by mouth Every 12 (Twelve) Hours.    Skin infection  -     clindamycin (CLEOCIN) 300 MG capsule; Take 1 capsule by  mouth 3 (Three) Times a Day.        Return Dr. Jones today.    Linda Hoffman, DNP, APRN-BC  09/11/2017

## 2017-09-11 NOTE — PROGRESS NOTES
09/11/2017      Linda Hoffman DNP, APRN  [address] [address]    Lukasz Savage  1965    Chief Complaint   Patient presents with   • Leg Swelling     Swelling bilateral feet and ankles with pain.Second toe on right foot discoloration.       Dear Linda Hoffman DNP,*:      HPI  I had the pleasure of seeing your patient Lukasz Savage in the office today.  Thank you kindly for this consultation.  As you recall, Lukasz Savage is a 52 y.o.  male who you are currently following for Routine health maintenance.  He does have a history of recent hospitalization in July for seizure activity, intoxication, swelling, and posterior reversible encephalopathy syndrome.  His stay included admission to ICU, and trach in place for ventilatory support.  He did have, patient's due to alcohol withdrawal and ventilator associated pneumonia and C. difficile.  He seems to be doing well and states he will be seeing Dr. Rasmussen soon to hopefully wean from his Keppra.  He has had continued swelling to his lower extremities however patient states this has improved.  He does have some tenderness to his left foot and states his pain is related to gout and has previously taken colchicine for this.  He was seen today by his primary care physician and has a painful right second toe.  The top of his toe is tender to touch with erythema.  He reports this came up after wearing compression stockings.  He does also have complaints of bilateral ankle pain.  His feet are nice and warm with palpable bounding pulses.  He does also have a strong signal onto the toes.    Past Medical History:   Diagnosis Date   • Angio-edema 7/3/2017   • Anxiety    • Arthritis    • Clostridium difficile colitis    • Erectile dysfunction 10/6/2016   • Gout    • HCAP (healthcare-associated pneumonia) 7/11/2017   • Hyperlipidemia    • Malignant hypertension    • MSSA (methicillin susceptible Staphylococcus aureus) infection  7/31/2017   • Obstructive sleep apnea    • Seizures 7/4/2017       Past Surgical History:   Procedure Laterality Date   • TRACHEOSTOMY N/A 7/12/2017    Procedure: TRACHEOSTOMY WITH TRACHEOSCOPY;  Surgeon: Jairon Pierson MD;  Location: [facility];  Service:        Family History   Problem Relation Age of Onset   • Hypertension Mother    • Diabetes Mother    • Heart disease Father    • Hypertension Father    • No Known Problems Daughter    • No Known Problems Son    • Diabetes Maternal Grandmother    • No Known Problems Maternal Grandfather    • No Known Problems Paternal Grandmother    • Heart attack Paternal Grandfather    • Kidney disease Sister        Social History     Social History   • Marital status: Single     Spouse name: N/A   • Number of children: N/A   • Years of education: N/A     Occupational History   • Not on file.     Social History Main Topics   • Smoking status: Former Smoker     Packs/day: 0.33     Years: 30.00     Types: Cigarettes     Quit date: 7/3/2017   • Smokeless tobacco: Never Used   • Alcohol use No      Comment: PER DAY FOR 30 YEARS; Last drink July 3, 2017   • Drug use: No   • Sexual activity: Defer     Other Topics Concern   • Not on file     Social History Narrative       Allergies   Allergen Reactions   • Lipitor [Atorvastatin] Rash   • Lisinopril Angioedema       Prior to Admission medications    Medication Sig Start Date End Date Taking? Authorizing Provider   amLODIPine (NORVASC) 10 MG tablet Take 1 tablet by mouth Daily. 9/11/17  Yes Linda Hoffman DNP, APRN   carvedilol (COREG) 25 MG tablet Take 1 tablet by mouth Every 12 (Twelve) Hours. 9/11/17  Yes Linda Hoffman DNP, APRN   clindamycin (CLEOCIN) 300 MG capsule Take 1 capsule by mouth 3 (Three) Times a Day. 9/11/17  Yes Linda Hoffman DNP, APRN   CloNIDine (CATAPRES) 0.1 MG tablet Take 1 tablet by mouth Every Night. 7/21/17  Yes Linda Hoffman DNP, APRN   furosemide (LASIX) 20 MG tablet Take 1 tablet by mouth  Daily. 9/5/17  Yes Linda Hoffman DNP, APRN   ibuprofen (ADVIL,MOTRIN) 600 MG tablet Take 1 tablet by mouth Every 6 (Six) Hours As Needed for Mild Pain . 9/5/17  Yes Linda Hoffman DNP, APRN   levETIRAcetam (KEPPRA) 500 MG tablet Take 1 tablet by mouth Every 12 (Twelve) Hours. 9/11/17  Yes Linda Hoffman DNP, APRN   losartan (COZAAR) 100 MG tablet Take 1 tablet by mouth Daily. 9/8/17  Yes EPI Summers   potassium chloride (K-DUR,KLOR-CON) 10 MEQ CR tablet Take 1 tablet by mouth Daily. 9/5/17  Yes Linda Hoffman DNP, APRN   raNITIdine (ZANTAC) 75 MG tablet Take 75 mg by mouth 2 (Two) Times a Day.   Yes Historical Provider, MD   vardenafil (LEVITRA) 10 MG tablet Take 1 tablet by mouth Daily As Needed for erectile dysfunction. 7/26/17  Yes Linda Hoffman DNP, APRN   colchicine 0.6 MG tablet Take 2 pills now, 1 pill in one hr.  Then Can take daily 7/26/17   Linda Hoffman DNP, APRN   omeprazole (PRILOSEC) 10 MG capsule Take 10 mg by mouth Daily.    Historical Provider, MD   amLODIPine (NORVASC) 10 MG tablet Take 1 tablet by mouth Daily. 7/19/17 9/11/17  Jairon Perez,    carvedilol (COREG) 25 MG tablet Take 1 tablet by mouth Every 12 (Twelve) Hours. 7/19/17 9/11/17  Jairon Perez,    levETIRAcetam (KEPPRA) 500 MG tablet Take 1 tablet by mouth Every 12 (Twelve) Hours. 8/17/17 9/11/17  EPI Cohen       Review of Systems   Constitutional: Negative.    HENT: Negative.    Eyes: Negative.    Respiratory: Negative.    Cardiovascular: Positive for leg swelling (ostly to bilateral feet).   Gastrointestinal: Negative.    Endocrine: Negative.    Genitourinary: Negative.    Musculoskeletal: Positive for arthralgias, gait problem (states his ankles bilaterally are painful) and myalgias.   Skin: Positive for color change.   Allergic/Immunologic: Negative.    Hematological: Negative.    Psychiatric/Behavioral: Negative.  Self-injury: 2 right second toe and top of left foot.   All  "other systems reviewed and are negative.      /76  Pulse 76  Ht 68\" (172.7 cm)  Wt 198 lb (89.8 kg)  BMI 30.11 kg/m2  Physical Exam   Constitutional: He is oriented to person, place, and time. He appears well-developed and well-nourished.   HENT:   Head: Normocephalic and atraumatic.   Eyes: Pupils are equal, round, and reactive to light. No scleral icterus.   Neck: Normal range of motion. Neck supple. No thyromegaly present.   Cardiovascular: Normal rate, regular rhythm, normal heart sounds and intact distal pulses.    Great Doppler signals to toes  Tenderness to her right second toe and erythema to top of toe only   Pulmonary/Chest: Effort normal and breath sounds normal.   Abdominal: Soft. Bowel sounds are normal.   Musculoskeletal: Normal range of motion.   Neurological: He is alert and oriented to person, place, and time.   Skin: Skin is warm and dry.   Psychiatric: He has a normal mood and affect. His behavior is normal. Judgment and thought content normal.   Nursing note and vitals reviewed.      Xr Foot 3+ View Left    Result Date: 8/18/2017  Narrative: EXAMINATION: XR FOOT 3+ VW LEFT- 8/18/2017 10:44 AM CDT  HISTORY: Left foot pain.  REPORT: 3 views of the left foot were obtained. There are no comparison studies.  There is extensive overgrowth and spurring of the first metatarsal head with severe narrowing of the first MTP joint. There is also spurring of the articular surface of the proximal phalanx of the great toe at the first MTP joint. No fractures identified. There is a small benign-appearing exostosis involving the shaft of the proximal phalanx of the third digit. Spurs are noted at the heads of the second and third metatarsals. There is dorsal soft tissue swelling over the distal foot particularly at the first MTP joint level.      Impression: Severe arthritic changes at the first metatarsophalangeal joint. Clinical correlation is recommended. No fractures identified. There are also spurs " at the heads of second and third metatarsals and a small exostosis of the shaft of the proximal phalanx of the third digit. This report was finalized on 08/18/2017 10:51 by Dr. Marvin Mar MD.    Mri Brain With & Without Contrast    Result Date: 8/29/2017  Narrative: EXAMINATION: MRI BRAIN W WO CONTRAST-  8/29/2017 3:01 PM CDT  HISTORY: PRES, seizure; E78.5-Hyperlipidemia, unspecified; I15.9-Secondary hypertension, unspecified; R56.9-Unspecified convulsions; I67.83-Posterior reversible encephalopathy syndrome.  Pre and postcontrast brain MRI compared with 07/03/2017.  No acute parenchymal ischemia based on the DWI sequence.  The posterior cortical and subcortical increased signal noted previously on the FLAIR sequences has essentially completely resolved.  No evidence of hemorrhage and no abnormal calcification is seen.  Postcontrast images show no abnormal parenchymal enhancement.  Summary: 1. Interval resolution of posterior cortical and subcortical FLAIR signal. 2. No stroke, hemorrhage, or abnormal enhancement. 3. No new or increased abnormality. This report was finalized on [id number] by Dr. Camilo Almodovar MD.      Patient Active Problem List   Diagnosis   • Hyperlipidemia   • HTN (hypertension)   • Gout   • Anxiety   • Arthritis   • Erectile dysfunction   • Angio-edema   • Seizures   • HCAP (healthcare-associated pneumonia)   • MSSA (methicillin susceptible Staphylococcus aureus) infection         ICD-10-CM ICD-9-CM   1. Acute bilateral ankle pain M25.571 719.47    M25.572 338.19   2. Hyperlipidemia, unspecified hyperlipidemia type E78.5 272.4   3. Secondary hypertension I15.9 405.99   4. Pain and swelling of toe, right M79.661 729.5    M79.89        Plan: After thoroughly evaluating Lukasz Savage, I believe the best course of action is to Remain conservative from vascular standpoint.  His circulation appears to be normal.  For completeness, I will obtain ABIs.  He does have bounding palpable  pulses.  His complaint is with tenderness to his right second toe which is tender to touch and has erythema to that,.  He also does have lower extremity swelling mostly to his feet which according to notes has improved.  I will have him see Dr. Vines related to his foot and ankle pain. The patient can continue taking her current medication regimen as previously planned.  This was all discussed in full with complete understanding.    Thank you for allowing me to participate in the care of your patient.  Please do not hesitate with any questions or concerns.  I will keep you aware of any further encounters with Lukasz Savage.        Sincerely yours,         EPI Benson, SUSHMA, APRN

## 2017-09-12 ENCOUNTER — HOSPITAL ENCOUNTER (OUTPATIENT)
Dept: GENERAL RADIOLOGY | Facility: HOSPITAL | Age: 52
Discharge: HOME | End: 2017-09-12
Attending: PODIATRIST | Admitting: PODIATRIST

## 2017-09-12 ENCOUNTER — HOSPITAL ENCOUNTER (OUTPATIENT)
Dept: GENERAL RADIOLOGY | Facility: HOSPITAL | Age: 52
Discharge: HOME | End: 2017-09-12
Attending: PODIATRIST

## 2017-09-12 ENCOUNTER — OFFICE VISIT (OUTPATIENT)
Dept: PODIATRY | Facility: CLINIC | Age: 52
End: 2017-09-12

## 2017-09-12 VITALS
BODY MASS INDEX: 30.31 KG/M2 | RESPIRATION RATE: 18 BRPM | SYSTOLIC BLOOD PRESSURE: 116 MMHG | HEART RATE: 79 BPM | DIASTOLIC BLOOD PRESSURE: 90 MMHG | HEIGHT: 68 IN | WEIGHT: 200 LBS | OXYGEN SATURATION: 98 %

## 2017-09-12 DIAGNOSIS — G40.309 GENERALIZED SEIZURE DISORDER (HCC): Primary | ICD-10-CM

## 2017-09-12 DIAGNOSIS — M25.572 CHRONIC PAIN OF BOTH ANKLES: ICD-10-CM

## 2017-09-12 DIAGNOSIS — M79.672 FOOT PAIN, BILATERAL: ICD-10-CM

## 2017-09-12 DIAGNOSIS — M10.9 ACUTE GOUT INVOLVING TOE OF RIGHT FOOT, UNSPECIFIED CAUSE: Primary | ICD-10-CM

## 2017-09-12 DIAGNOSIS — M79.671 FOOT PAIN, BILATERAL: ICD-10-CM

## 2017-09-12 DIAGNOSIS — G89.29 CHRONIC PAIN OF BOTH ANKLES: ICD-10-CM

## 2017-09-12 DIAGNOSIS — M21.962 FOOT DEFORMITY, LEFT: ICD-10-CM

## 2017-09-12 DIAGNOSIS — M10.9 ACUTE GOUT INVOLVING TOE OF LEFT FOOT, UNSPECIFIED CAUSE: ICD-10-CM

## 2017-09-12 DIAGNOSIS — I67.83 POSTERIOR REVERSIBLE ENCEPHALOPATHY SYNDROME (PRES): ICD-10-CM

## 2017-09-12 DIAGNOSIS — M20.5X1 HALLUX LIMITUS, ACQUIRED, RIGHT: ICD-10-CM

## 2017-09-12 DIAGNOSIS — M25.571 CHRONIC PAIN OF BOTH ANKLES: ICD-10-CM

## 2017-09-12 DIAGNOSIS — M20.5X2 HALLUX LIMITUS, ACQUIRED, LEFT: ICD-10-CM

## 2017-09-12 PROCEDURE — 99203 OFFICE O/P NEW LOW 30 MIN: CPT | Performed by: PODIATRIST

## 2017-09-12 PROCEDURE — 73610 X-RAY EXAM OF ANKLE: CPT

## 2017-09-12 PROCEDURE — 73630 X-RAY EXAM OF FOOT: CPT

## 2017-09-12 RX ORDER — COLCHICINE 0.6 MG/1
TABLET ORAL
Qty: 14 TABLET | Refills: 3 | Status: SHIPPED | OUTPATIENT
Start: 2017-09-12 | End: 2018-02-14

## 2017-09-12 NOTE — PROGRESS NOTES
Patient was advised of xray results and verbalized understanding. He stated that all questions were answered. Patient was advised to call our office if he had any further questions or concerns.

## 2017-09-12 NOTE — PROGRESS NOTES
Patient was advised of MRI results and verbalized understanding. He stated that all questions were answered. Patient was advised to call our office if he had any further questions or concerns.

## 2017-09-12 NOTE — PROGRESS NOTES
[facility] - PODIATRY    Today's Date: 09/12/17    Patient Name: Lukasz Savage  MRN: [id number]  CSN: [id number]  PCP: Linda Hoffman, DNP, APRN  Referring Provider: No ref. provider found    SUBJECTIVE     Chief Complaint   Patient presents with   • Left Ankle - Ankle Pain, Lower Extremity Issue, Gout   • Right Ankle - Ankle Pain, Lower Extremity Issue, Gout     HPI: Lukasz Savage, a 52 y.o.male, comes to clinic as a(n) new patient complaining of foot pain and ankle pain. Patient has h/o anxiety, arthritis, Gout, ED, HLD, HTN, Sleep Apnea, Seizure. States that in early July he had a seizure which required hospitalization. He eneded up getting HCAP, MSSA, and CDiff infections during that time. Was originally planned for LTACH and then nursing home but had improvement sooner than expected and was released to home. Since that time he has had ongoing leg, ankle and foot pain, especially when walking. Now walks with a cane due to weakness in legs and unsteadiness. Relates he has not had PT since being discharged. Has recently (past 3 days) had redness with a sharp burning pain to his right 2nd toe and left forefoot. Relates previously having gout and that the current pain feels similar. Has previosuly taken colchicine but does not have any currently. Also was prescribed allopurinol but has not started the medication. Denies wounds. Denies trauma but admits having feet in an elevated position for a long period due to his hospitalization. Admits pain at 6/10 level and described as burning, aching and throbbing. Denies any constitutional symptoms. No other pedal complaints at this time.    Past Medical History:   Diagnosis Date   • Angio-edema 7/3/2017   • Anxiety    • Arthritis    • Clostridium difficile colitis    • Erectile dysfunction 10/6/2016   • Gout    • HCAP (healthcare-associated pneumonia) 7/11/2017   • Hyperlipidemia    • Malignant hypertension    • MSSA (methicillin susceptible  Staphylococcus aureus) infection 7/31/2017   • Obstructive sleep apnea    • Seizures 7/4/2017     Past Surgical History:   Procedure Laterality Date   • TRACHEOSTOMY N/A 7/12/2017    Procedure: TRACHEOSTOMY WITH TRACHEOSCOPY;  Surgeon: Jairon Pierson MD;  Location: [facility];  Service:      Family History   Problem Relation Age of Onset   • Hypertension Mother    • Diabetes Mother    • Heart disease Father    • Hypertension Father    • No Known Problems Daughter    • No Known Problems Son    • Diabetes Maternal Grandmother    • No Known Problems Maternal Grandfather    • No Known Problems Paternal Grandmother    • Heart attack Paternal Grandfather    • Kidney disease Sister      Social History     Social History   • Marital status: Single     Spouse name: N/A   • Number of children: N/A   • Years of education: N/A     Occupational History   • Not on file.     Social History Main Topics   • Smoking status: Former Smoker     Packs/day: 0.33     Years: 30.00     Types: Cigarettes     Quit date: 7/3/2017   • Smokeless tobacco: Never Used   • Alcohol use No      Comment: PER DAY FOR 30 YEARS; Last drink July 3, 2017   • Drug use: No   • Sexual activity: Defer     Other Topics Concern   • Not on file     Social History Narrative     Allergies   Allergen Reactions   • Lipitor [Atorvastatin] Rash   • Lisinopril Angioedema     Current Outpatient Prescriptions   Medication Sig Dispense Refill   • amLODIPine (NORVASC) 10 MG tablet Take 1 tablet by mouth Daily. 30 tablet 1   • carvedilol (COREG) 25 MG tablet Take 1 tablet by mouth Every 12 (Twelve) Hours. 60 tablet 1   • clindamycin (CLEOCIN) 300 MG capsule Take 1 capsule by mouth 3 (Three) Times a Day. 40 capsule 0   • CloNIDine (CATAPRES) 0.1 MG tablet Take 1 tablet by mouth Every Night. 60 tablet 2   • colchicine 0.6 MG tablet Take 2 pills now, 1 pill in one hr.  Then Can take daily 14 tablet 3   • furosemide (LASIX) 20 MG tablet Take 1 tablet by mouth Daily. 30 tablet 0    • ibuprofen (ADVIL,MOTRIN) 600 MG tablet Take 1 tablet by mouth Every 6 (Six) Hours As Needed for Mild Pain . 90 tablet 1   • levETIRAcetam (KEPPRA) 500 MG tablet Take 1 tablet by mouth Every 12 (Twelve) Hours. 60 tablet 1   • losartan (COZAAR) 100 MG tablet Take 1 tablet by mouth Daily. 30 tablet 1   • omeprazole (PRILOSEC) 10 MG capsule Take 10 mg by mouth Daily.     • potassium chloride (K-DUR,KLOR-CON) 10 MEQ CR tablet Take 1 tablet by mouth Daily. 30 tablet 0   • raNITIdine (ZANTAC) 75 MG tablet Take 75 mg by mouth 2 (Two) Times a Day.     • vardenafil (LEVITRA) 10 MG tablet Take 1 tablet by mouth Daily As Needed for erectile dysfunction. 10 tablet 5     No current facility-administered medications for this visit.      Review of Systems   Constitutional: Negative for chills and fever.   HENT: Negative for congestion.    Respiratory: Negative for shortness of breath.    Cardiovascular: Positive for leg swelling. Negative for chest pain.   Gastrointestinal: Negative for constipation, diarrhea, nausea and vomiting.   Musculoskeletal: Positive for arthralgias.   Skin: Positive for color change. Negative for wound.   Neurological: Negative for numbness.       OBJECTIVE     Vitals:    09/12/17 1026   BP: 116/90   Pulse: 79   Resp: 18   SpO2: 98%       PHYSICAL EXAM  GEN:   A&Ox3, NAD. Pt presents to clinic ambulating with cane and wearing Casual Shoes. Accompanied by mother.     NEURO:   Protective sensation intact to 10/10 sites Right foot, 10/10 site Left foot using [place] monofilament  Light touch sensation present  No Tinel's or Villeux sign.    VASC:  Skin temperature Warm to Warm proximal to distal sabi, increased warmth to right 2nd toe, left 2nd MTPJ  DP pulses 2/4 Right, 2/4 Left  PT pulses 2/4 Right, 2/4 Left  CFT 3 sec sabi  Pedal hair growth present  1+ pitting edema noted sabi  Varicosities absent sabi    MUSC/SKEL:  Muscle Strength Right foot Dorsiflexors 3/5, Plantarflexors 4+/5, Evertors 3+/5,  Invertors 3+/5  Muscle Strength Left foot Dorsiflexors 3/5, Plantarflexors 4+/5, Evertors 3+/5, Invertors 3+/5  ROM of the 1st MTPJ sabi is greatly decreased with mild pain and crepitus  ROM of the MTJ is full without pain or crepitus    ROM of the STJ is full without pain or crepitus     ROM of the ankle joint is full with light pain and no crepitus    POP of right 2nd toe, left 2nd MTPJ, anterior ankle sabi  Rectus foot type to right  Increased medial and dorsal bony prominence to 1st MTPJ saib  Gait pattern: Steppage  C-shaped left foot with adduction of digits    DERM:  Pedal nails x10 are within normal limits of length and thickness  Webspaces are Clean, Dry, and Intact  Skin is normal in turgor and texture with no open wounds or sores appreciated.  Mild fading erythema to left 2nd MTPJ, no fluctuance or crepitus noted  Erythema noted to dorsal right 2nd digit with light swelling and mild enlargement of joints, no fluctuance or crepitus noted.        RADIOLOGY/NUCLEAR:  Xr Foot 3+ View Left    Result Date: 8/18/2017  Narrative: EXAMINATION: XR FOOT 3+ VW LEFT- 8/18/2017 10:44 AM CDT  HISTORY: Left foot pain.  REPORT: 3 views of the left foot were obtained. There are no comparison studies.  There is extensive overgrowth and spurring of the first metatarsal head with severe narrowing of the first MTP joint. There is also spurring of the articular surface of the proximal phalanx of the great toe at the first MTP joint. No fractures identified. There is a small benign-appearing exostosis involving the shaft of the proximal phalanx of the third digit. Spurs are noted at the heads of the second and third metatarsals. There is dorsal soft tissue swelling over the distal foot particularly at the first MTP joint level.      Impression: Severe arthritic changes at the first metatarsophalangeal joint. Clinical correlation is recommended. No fractures identified. There are also spurs at the heads of second and third  metatarsals and a small exostosis of the shaft of the proximal phalanx of the third digit. This report was finalized on 08/18/2017 10:51 by Dr. Marvin Mar MD.    Mri Brain With & Without Contrast    Result Date: 8/29/2017  Narrative: EXAMINATION: MRI BRAIN W WO CONTRAST-  8/29/2017 3:01 PM CDT  HISTORY: PRES, seizure; E78.5-Hyperlipidemia, unspecified; I15.9-Secondary hypertension, unspecified; R56.9-Unspecified convulsions; I67.83-Posterior reversible encephalopathy syndrome.  Pre and postcontrast brain MRI compared with 07/03/2017.  No acute parenchymal ischemia based on the DWI sequence.  The posterior cortical and subcortical increased signal noted previously on the FLAIR sequences has essentially completely resolved.  No evidence of hemorrhage and no abnormal calcification is seen.  Postcontrast images show no abnormal parenchymal enhancement.  Summary: 1. Interval resolution of posterior cortical and subcortical FLAIR signal. 2. No stroke, hemorrhage, or abnormal enhancement. 3. No new or increased abnormality. This report was finalized on [id number] by Dr. Camilo Almodovar MD.    Xr Ankle 3+ View Bilateral    Result Date: 9/12/2017  Narrative: EXAMINATION: XR ANKLE 3+ VW BILATERAL- 9/12/2017 12:40 PM CDT  HISTORY: Bilateral ankle pain.  REPORT: 3 views of each ankle were obtained. There are no comparison studies.  On the left, alignment is normal. No fractures identified. The joint spaces are preserved. There is mild circumferential soft tissue swelling.  On the right, there is also mild circumferential soft tissue swelling and normal bony alignment without fracture. The joint spaces are preserved on the right. On the lateral view of the right ankle there is moderate dorsal spurring at the mid foot.      Impression: No acute osseous abnormality. Degenerative spurring of the midfoot dorsally on the right is noted. There is circumferential soft tissue swelling at each ankle. This report was finalized on  09/12/2017 12:41 by Dr. Marvin Mar MD.    Xr Foot 3+ View Bilateral    Result Date: 9/12/2017  Narrative: EXAMINATION: XR FOOT 3+ VW BILATERAL- 9/12/2017 12:41 PM CDT  HISTORY: Bilateral foot pain.  REPORT: 3 views of each foot were obtained. There are no comparison studies.  On the right, there is severe narrowing and sclerosis with spurring at the first metatarsophalangeal joint. There is bony remodeling at this location. No acute fractures identified. The other joint spaces are preserved. Is mild soft tissue swelling adjacent to the fifth metatarsophalangeal joint. On the lateral view, dorsal spurring is present at the first MCP joint and midfoot. There is dorsal soft tissue swelling.  On the left, there are similar changes, with severe narrowing at the first metatarsophalangeal joint with sclerosis and spurring, not as advanced as on the right. This also spurring at the head of the second metatarsal with mild subluxation of the second metatarsophalangeal joint. No fractures seen on the left. There is a small exostosis at the proximal shaft of the proximal phalanx of the third digit. This appears benign. The other joint spaces are preserved. There is moderate dorsal soft tissue swelling of the left foot.      Impression: Severe osteoarthritis involving the first metatarsophalangeal joints with extensive spurring. Mild degenerative changes at the head of the second left metatarsal with mild subluxation of the second metatarsophalangeal joint. There is a small benign-appearing exostosis at the proximal shaft of the proximal phalanx of the third left digit. Dorsal soft tissue swelling is present bilaterally, greater on the left. This report was finalized on 09/12/2017 12:45 by Dr. Marvin Mar MD.      LABORATORY/CULTURE RESULTS:      PATHOLOGY RESULTS:       ASSESSMENT/PLAN     Lukasz was seen today for ankle pain, lower extremity issue, gout, ankle pain, lower extremity issue and gout.    Diagnoses and  all orders for this visit:    Acute gout involving toe of right foot, unspecified cause    Acute gout involving toe of left foot, unspecified cause  -     colchicine 0.6 MG tablet; Take 2 pills now, 1 pill in one hr.  Then Can take daily    Foot pain, bilateral  -     XR Ankle 3+ View Bilateral; Future  -     XR Foot 3+ View Bilateral; Future    Chronic pain of both ankles    Foot deformity, left    Hallux limitus, acquired, left    Hallux limitus, acquired, right      Comprehensive lower extremity examination and evaluation was performed.  Discussed findings and treatment plan including risks, benefits, and treatment options with patient in detail. Patient agreed with treatment plan.  Given multiple ailments of the foot and ankle, will systematically treat each starting with acute gout flare with use of colchicine, take as written. Upon re-evaluation, will consider PT for LE strengthening s/p seizure/coma. Arthritic changes and foot deformity may contribute to pt's pain but will be the last to be treated.   An After Visit Summary was printed and given to the patient at discharge, including (if requested) any available informative/educational handouts regarding diagnosis, treatment, or medications. All questions were answered to patient/family satisfaction. Should symptoms fail to improve or worsen they agree to call or return to clinic or to go to the Emergency Department. Discussed the importance of following up with any needed screening tests/labs/specialist appointments and any requested follow-up recommended by me today. Importance of maintaining follow-up discussed and patient accepts that missed appointments can delay diagnosis and potentially lead to worsening of conditions.  Return in about 2 weeks (around 9/26/2017)., or sooner if acute issues arise.        This document has been electronically signed by Abebe Vines DPM on September 12, 2017 4:45 PM

## 2017-09-12 NOTE — TELEPHONE ENCOUNTER
PLEASE instruct patient to wean and d/c keppra. Take one tablet at HS for 5 days then stop. Will repeat EEG in 6 weeks. No driving until seen in follow up and seizure precautions.  Also, see if we can move his appointment until after the EEG in 6 weeks.(he is scheduled for 10/6/17)  thanks

## 2017-09-18 ENCOUNTER — OFFICE VISIT (OUTPATIENT)
Dept: VASCULAR SURGERY | Facility: CLINIC | Age: 52
End: 2017-09-18

## 2017-09-18 VITALS
DIASTOLIC BLOOD PRESSURE: 84 MMHG | SYSTOLIC BLOOD PRESSURE: 132 MMHG | HEIGHT: 68 IN | WEIGHT: 198 LBS | BODY MASS INDEX: 30.01 KG/M2 | HEART RATE: 86 BPM

## 2017-09-18 DIAGNOSIS — M79.89 LEG SWELLING: Primary | ICD-10-CM

## 2017-09-18 PROCEDURE — 99213 OFFICE O/P EST LOW 20 MIN: CPT | Performed by: NURSE PRACTITIONER

## 2017-09-18 NOTE — PROGRESS NOTES
09/18/2017       Linda Hoffman DNP, APRN  [address] [address]    Lukasz Savage  1965    Chief Complaint   Patient presents with   • Follow-up     Bilateral leg swelling is improved.       Dear Linda Hoffman DNP, APRN        HPI  I had the pleasure of seeing your patient Lukasz Savage in the office today.  Thank you kindly for this consultation.  As you recall, Lukasz Savage is a 52 y.o.  male who you are currently following for Routine health maintenance.  He does have a history of recent hospitalization in July for seizure activity, intoxication, swelling, and posterior reversible encephalopathy syndrome.  His stay included admission to ICU, and trach in place for ventilatory support.  He did have, patient's due to alcohol withdrawal and ventilator associated pneumonia and C. difficile.  He seems to be doing well and states he will be seeing Dr. Rasmussen soon to hopefully wean from his Keppra.  He has had continued swelling to his lower extremities however patient states this has improved.  He does have some tenderness to his left foot and states his pain is related to gout and has previously taken colchicine for this.   His feet are nice and warm with palpable bounding pulses.  He does also have a strong signal onto the toes.  He is feeling much better and reports the pain is almost completely gone.        Review of Systems   Constitutional: Negative.    HENT: Negative.    Eyes: Negative.    Respiratory: Negative.    Cardiovascular: Positive for leg swelling (ostly to bilateral feet).   Gastrointestinal: Negative.    Endocrine: Negative.    Genitourinary: Negative.    Musculoskeletal: Positive for arthralgias, gait problem (states his ankles bilaterally are painful) and myalgias.   Skin: Positive for color change.   Allergic/Immunologic: Negative.    Hematological: Negative.    Psychiatric/Behavioral: Negative.  Self-injury: 2 right second toe and top of left  "foot.   All other systems reviewed and are negative.      /84  Pulse 86  Ht 68\" (172.7 cm)  Wt 198 lb (89.8 kg)  BMI 30.11 kg/m2  Physical Exam   Constitutional: He is oriented to person, place, and time. He appears well-developed and well-nourished.   HENT:   Head: Normocephalic and atraumatic.   Eyes: Pupils are equal, round, and reactive to light. No scleral icterus.   Neck: Normal range of motion. Neck supple. No thyromegaly present.   Cardiovascular: Normal rate, regular rhythm, normal heart sounds and intact distal pulses.    Great Doppler signals to toes  Tenderness to her right second toe and erythema to top of toe only   Pulmonary/Chest: Effort normal and breath sounds normal.   Abdominal: Soft. Bowel sounds are normal.   Musculoskeletal: Normal range of motion.   Neurological: He is alert and oriented to person, place, and time.   Skin: Skin is warm and dry.   Psychiatric: He has a normal mood and affect. His behavior is normal. Judgment and thought content normal.   Nursing note and vitals reviewed.      Mri Brain With & Without Contrast    Result Date: 8/29/2017  Narrative: EXAMINATION: MRI BRAIN W WO CONTRAST-  8/29/2017 3:01 PM CDT  HISTORY: PRES, seizure; E78.5-Hyperlipidemia, unspecified; I15.9-Secondary hypertension, unspecified; R56.9-Unspecified convulsions; I67.83-Posterior reversible encephalopathy syndrome.  Pre and postcontrast brain MRI compared with 07/03/2017.  No acute parenchymal ischemia based on the DWI sequence.  The posterior cortical and subcortical increased signal noted previously on the FLAIR sequences has essentially completely resolved.  No evidence of hemorrhage and no abnormal calcification is seen.  Postcontrast images show no abnormal parenchymal enhancement.  Summary: 1. Interval resolution of posterior cortical and subcortical FLAIR signal. 2. No stroke, hemorrhage, or abnormal enhancement. 3. No new or increased abnormality. This report was finalized on " [id number] by Dr. Camilo Almodovar MD.    Xr Ankle 3+ View Bilateral    Result Date: 9/12/2017  Narrative: EXAMINATION: XR ANKLE 3+ VW BILATERAL- 9/12/2017 12:40 PM CDT  HISTORY: Bilateral ankle pain.  REPORT: 3 views of each ankle were obtained. There are no comparison studies.  On the left, alignment is normal. No fractures identified. The joint spaces are preserved. There is mild circumferential soft tissue swelling.  On the right, there is also mild circumferential soft tissue swelling and normal bony alignment without fracture. The joint spaces are preserved on the right. On the lateral view of the right ankle there is moderate dorsal spurring at the mid foot.      Impression: No acute osseous abnormality. Degenerative spurring of the midfoot dorsally on the right is noted. There is circumferential soft tissue swelling at each ankle. This report was finalized on 09/12/2017 12:41 by Dr. Marvin Mar MD.    Xr Foot 3+ View Bilateral    Result Date: 9/12/2017  Narrative: EXAMINATION: XR FOOT 3+ VW BILATERAL- 9/12/2017 12:41 PM CDT  HISTORY: Bilateral foot pain.  REPORT: 3 views of each foot were obtained. There are no comparison studies.  On the right, there is severe narrowing and sclerosis with spurring at the first metatarsophalangeal joint. There is bony remodeling at this location. No acute fractures identified. The other joint spaces are preserved. Is mild soft tissue swelling adjacent to the fifth metatarsophalangeal joint. On the lateral view, dorsal spurring is present at the first MCP joint and midfoot. There is dorsal soft tissue swelling.  On the left, there are similar changes, with severe narrowing at the first metatarsophalangeal joint with sclerosis and spurring, not as advanced as on the right. This also spurring at the head of the second metatarsal with mild subluxation of the second metatarsophalangeal joint. No fractures seen on the left. There is a small exostosis at the proximal shaft of  the proximal phalanx of the third digit. This appears benign. The other joint spaces are preserved. There is moderate dorsal soft tissue swelling of the left foot.      Impression: Severe osteoarthritis involving the first metatarsophalangeal joints with extensive spurring. Mild degenerative changes at the head of the second left metatarsal with mild subluxation of the second metatarsophalangeal joint. There is a small benign-appearing exostosis at the proximal shaft of the proximal phalanx of the third left digit. Dorsal soft tissue swelling is present bilaterally, greater on the left. This report was finalized on 09/12/2017 12:45 by Dr. Marvin Mar MD.      Patient Active Problem List   Diagnosis   • Hyperlipidemia   • HTN (hypertension)   • Gout   • Anxiety   • Arthritis   • Erectile dysfunction   • Angio-edema   • Seizures   • HCAP (healthcare-associated pneumonia)   • MSSA (methicillin susceptible Staphylococcus aureus) infection         ICD-10-CM ICD-9-CM   1. Leg swelling M79.89 729.81       Plan: After thoroughly evaluating Lukasz Savage, I believe the best course of action is to Remain conservative from vascular standpoint.  His circulation appears to be normal. He does have bounding palpable pulses.  His complaint is with tenderness to his right second toe which was tender and Dr. Vines treated for gout.  His symptoms have improved.    He also does have lower extremity swelling mostly to his feet which according to notes has improved.  I will have him see Dr. Vines related to his foot and ankle pain.  He can follow up with us as needed.  The patient can continue taking her current medication regimen as previously planned.  This was all discussed in full with complete understanding.    Thank you for allowing me to participate in the care of your patient.  Please do not hesitate with any questions or concerns.  I will keep you aware of any further encounters with Lukasz Savage.        Sincerely  yours,         Marita Groves, APRN    Linda Hoffman, DNP, APRN

## 2017-09-25 ENCOUNTER — TELEPHONE (OUTPATIENT)
Dept: PODIATRY | Facility: CLINIC | Age: 52
End: 2017-09-25

## 2017-09-25 NOTE — TELEPHONE ENCOUNTER
Left message on machine for a call back to discuss Colchicine rejection sent from [facility] with patient.

## 2017-09-26 NOTE — TELEPHONE ENCOUNTER
Patient returned my call and stated he was able to get 9 Colchicine tablets through his insurance. He stated after that he is purchasing them at 7 dollars a tablet. Patient states he does not require a prior authorization.

## 2017-09-29 ENCOUNTER — OFFICE VISIT (OUTPATIENT)
Dept: PODIATRY | Facility: CLINIC | Age: 52
End: 2017-09-29

## 2017-09-29 VITALS
BODY MASS INDEX: 30.46 KG/M2 | DIASTOLIC BLOOD PRESSURE: 78 MMHG | HEART RATE: 86 BPM | WEIGHT: 201 LBS | HEIGHT: 68 IN | SYSTOLIC BLOOD PRESSURE: 120 MMHG

## 2017-09-29 DIAGNOSIS — M79.672 FOOT PAIN, BILATERAL: Primary | ICD-10-CM

## 2017-09-29 DIAGNOSIS — M79.671 FOOT PAIN, BILATERAL: Primary | ICD-10-CM

## 2017-09-29 DIAGNOSIS — M1A.0710 CHRONIC GOUT OF RIGHT FOOT, UNSPECIFIED CAUSE: ICD-10-CM

## 2017-09-29 DIAGNOSIS — M21.962 FOOT DEFORMITY, LEFT: ICD-10-CM

## 2017-09-29 PROBLEM — M21.969 FOOT DEFORMITY: Status: ACTIVE | Noted: 2017-09-29

## 2017-09-29 PROCEDURE — 99212 OFFICE O/P EST SF 10 MIN: CPT | Performed by: PODIATRIST

## 2017-09-29 NOTE — PATIENT INSTRUCTIONS
Gout  Gout is painful swelling that can occur in some of your joints. Gout is a type of arthritis. This condition is caused by having too much uric acid in your body. Uric acid is a chemical that forms when your body breaks down substances called purines. Purines are important for building body proteins.  When your body has too much uric acid, sharp crystals can form and build up inside your joints. This causes pain and swelling. Gout attacks can happen quickly and be very painful (acute gout). Over time, the attacks can affect more joints and become more frequent (chronic gout). Gout can also cause uric acid to build up under your skin and inside your kidneys.  CAUSES  This condition is caused by too much uric acid in your blood. This can occur because:  · Your kidneys do not remove enough uric acid from your blood. This is the most common cause.  · Your body makes too much uric acid. This can occur with some cancers and cancer treatments. It can also occur if your body is breaking down too many red blood cells (hemolytic anemia).  · You eat too many foods that are high in purines. These foods include organ meats and some seafood. Alcohol, especially beer, is also high in purines.  A gout attack may be triggered by trauma or stress.  RISK FACTORS  This condition is more likely to develop in people who:  · Have a family history of gout.  · Are male and middle-aged.  · Are female and have gone through menopause.  · Are obese.  · Frequently drink alcohol, especially beer.  · Are dehydrated.  · Lose weight too quickly.  · Have an organ transplant.  · Have lead poisoning.  · Take certain medicines, including aspirin, cyclosporine, diuretics, levodopa, and niacin.  · Have kidney disease or psoriasis.  SYMPTOMS  An attack of acute gout happens quickly. It usually occurs in just one joint. The most common place is the big toe. Attacks often start at night. Other joints that may be affected include joints of the feet,  ankle, knee, fingers, wrist, or elbow. Symptoms may include:  · Severe pain.  · Warmth.  · Swelling.  · Stiffness.  · Tenderness. The affected joint may be very painful to touch.  · Shiny, red, or purple skin.  · Chills and fever.  Chronic gout may cause symptoms more frequently. More joints may be involved. You may also have white or yellow lumps (tophi) on your hands or feet or in other areas near your joints.  DIAGNOSIS  This condition is diagnosed based on your symptoms, medical history, and physical exam. You may have tests, such as:  · Blood tests to measure uric acid levels.  · Removal of joint fluid with a needle (aspiration) to look for uric acid crystals.  · X-rays to look for joint damage.  TREATMENT  Treatment for this condition has two phases: treating an acute attack and preventing future attacks. Acute gout treatment may include medicines to reduce pain and swelling, including:  · NSAIDs.  · Steroids. These are strong anti-inflammatory medicines that can be taken by mouth (orally) or injected into a joint.  · Colchicine. This medicine relieves pain and swelling when it is taken soon after an attack. It can be given orally or through an IV tube.  Preventive treatment may include:  · Daily use of smaller doses of NSAIDs or colchicine.  · Use of a medicine that reduces uric acid levels in your blood.  · Changes to your diet. You may need to see a specialist about healthy eating (dietitian).  HOME CARE INSTRUCTIONS  During a Gout Attack  · If directed, apply ice to the affected area:    Put ice in a plastic bag.    Place a towel between your skin and the bag.    Leave the ice on for 20 minutes, 2-3 times a day.  · Rest the joint as much as possible. If the affected joint is in your leg, you may be given crutches to use.  · Raise (elevate) the affected joint above the level of your heart as often as possible.  · Drink enough fluids to keep your urine clear or pale yellow.  · Take over-the-counter and  prescription medicines only as told by your health care provider.  · Do not drive or operate heavy machinery while taking prescription pain medicine.  · Follow instructions from your health care provider about eating or drinking restrictions.  · Return to your normal activities as told by your health care provider. Ask your health care provider what activities are safe for you.  Avoiding Future Gout Attacks  · Follow a low-purine diet as told by your dietitian or health care provider. Avoid foods and drinks that are high in purines, including liver, kidney, anchovies, asparagus, herring, mushrooms, mussels, and beer.  · Limit alcohol intake to no more than 1 drink a day for nonpregnant women and 2 drinks a day for men. One drink equals 12 oz of beer, 5 oz of wine, or 1½ oz of hard liquor.  · Maintain a healthy weight or lose weight if you are overweight. If you want to lose weight, talk with your health care provider. It is important that you do not lose weight too quickly.  · Start or maintain an exercise program as told by your health care provider.  · Drink enough fluids to keep your urine clear or pale yellow.  · Take over-the-counter and prescription medicines only as told by your health care provider.  · Keep all follow-up visits as told by your health care provider. This is important.  SEEK MEDICAL CARE IF:  · You have another gout attack.  · You continue to have symptoms of a gout attack after10 days of treatment.  · You have side effects from your medicines.  · You have chills or a fever.  · You have burning pain when you urinate.  · You have pain in your lower back or belly.  SEEK IMMEDIATE MEDICAL CARE IF:  · You have severe or uncontrolled pain.  · You cannot urinate.     This information is not intended to replace advice given to you by your health care provider. Make sure you discuss any questions you have with your health care provider.     Document Released: 12/15/2001 Document Revised: 04/10/2017  Document Reviewed: 09/29/2016  bright box Interactive Patient Education ©2017 [employer]

## 2017-09-29 NOTE — PROGRESS NOTES
[facility] - PODIATRY    Today's Date: 09/29/17    Patient Name: Lukasz Savage  MRN: [id number]  CSN: [id number]  PCP: Linda Hoffman, DNP, APRN  Referring Provider: No ref. provider found    SUBJECTIVE     Chief Complaint   Patient presents with   • Follow-up     Gout left foot andd swelling right second toe follow up.Pain improved.     HPI: Lukasz Savage, a 52 y.o.male, comes to clinic as a(n) esetablished patient complaining of foot pain and ankle pain. Patient has h/o anxiety, arthritis, Gout, ED, HLD, HTN, Sleep Apnea, Seizure. States that in early July he had a seizure which required hospitalization. He eneded up getting HCAP, MSSA, and CDiff infections during that time. Was originally planned for LTACH and then nursing home but had improvement sooner than expected and was released to home. States that he took the colchicine and his pain has improved significantly. Also notes that the weakness in his feet and legs and improved dramatically. Denies wounds. Denies trauma but admits having feet in an elevated position for a long period due to his hospitalization. Denies pain. Denies any constitutional symptoms. No other pedal complaints at this time.    Past Medical History:   Diagnosis Date   • Angio-edema 7/3/2017   • Anxiety    • Arthritis    • Clostridium difficile colitis    • Erectile dysfunction 10/6/2016   • Gout    • HCAP (healthcare-associated pneumonia) 7/11/2017   • Hyperlipidemia    • Malignant hypertension    • MSSA (methicillin susceptible Staphylococcus aureus) infection 7/31/2017   • Obstructive sleep apnea    • Seizures 7/4/2017     Past Surgical History:   Procedure Laterality Date   • TRACHEOSTOMY N/A 7/12/2017    Procedure: TRACHEOSTOMY WITH TRACHEOSCOPY;  Surgeon: Jairon Pierson MD;  Location: [facility] OR;  Service:      Family History   Problem Relation Age of Onset   • Hypertension Mother    • Diabetes Mother    • Heart disease Father    • Hypertension Father     • No Known Problems Daughter    • No Known Problems Son    • Diabetes Maternal Grandmother    • No Known Problems Maternal Grandfather    • No Known Problems Paternal Grandmother    • Heart attack Paternal Grandfather    • Kidney disease Sister      Social History     Social History   • Marital status: Single     Spouse name: N/A   • Number of children: N/A   • Years of education: N/A     Occupational History   • Not on file.     Social History Main Topics   • Smoking status: Former Smoker     Packs/day: 0.33     Years: 30.00     Types: Cigarettes     Quit date: 7/3/2017   • Smokeless tobacco: Never Used   • Alcohol use No      Comment: PER DAY FOR 30 YEARS; Last drink July 3, 2017   • Drug use: No   • Sexual activity: Defer     Other Topics Concern   • Not on file     Social History Narrative     Allergies   Allergen Reactions   • Lipitor [Atorvastatin] Rash   • Lisinopril Angioedema     Current Outpatient Prescriptions   Medication Sig Dispense Refill   • amLODIPine (NORVASC) 10 MG tablet Take 1 tablet by mouth Daily. 30 tablet 1   • carvedilol (COREG) 25 MG tablet Take 1 tablet by mouth Every 12 (Twelve) Hours. 60 tablet 1   • CloNIDine (CATAPRES) 0.1 MG tablet Take 1 tablet by mouth Every Night. 60 tablet 2   • colchicine 0.6 MG tablet Take 2 pills now, 1 pill in one hr.  Then Can take daily 14 tablet 3   • furosemide (LASIX) 20 MG tablet Take 1 tablet by mouth Daily. 30 tablet 0   • ibuprofen (ADVIL,MOTRIN) 600 MG tablet Take 1 tablet by mouth Every 6 (Six) Hours As Needed for Mild Pain . 90 tablet 1   • losartan (COZAAR) 100 MG tablet Take 1 tablet by mouth Daily. 30 tablet 1   • omeprazole (PRILOSEC) 10 MG capsule Take 10 mg by mouth Daily.     • potassium chloride (K-DUR,KLOR-CON) 10 MEQ CR tablet Take 1 tablet by mouth Daily. 30 tablet 0   • raNITIdine (ZANTAC) 75 MG tablet Take 75 mg by mouth 2 (Two) Times a Day.     • vardenafil (LEVITRA) 10 MG tablet Take 1 tablet by mouth Daily As Needed for erectile  dysfunction. 10 tablet 5     No current facility-administered medications for this visit.      Review of Systems   Constitutional: Negative for chills and fever.   HENT: Negative for congestion.    Respiratory: Negative for shortness of breath.    Cardiovascular: Positive for leg swelling. Negative for chest pain.   Gastrointestinal: Negative for constipation, diarrhea, nausea and vomiting.   Musculoskeletal: Positive for arthralgias.   Skin: Positive for color change. Negative for wound.   Neurological: Negative for numbness.       OBJECTIVE     Vitals:    09/29/17 0927   BP: 120/78   Pulse: 86       PHYSICAL EXAM  GEN:   A&Ox3, NAD. Pt presents to clinic ambulating with cane and wearing Casual Shoes. Accompanied by mother.     NEURO:   Protective sensation intact to 10/10 sites Right foot, 10/10 site Left foot using [place] monofilament  Light touch sensation present  No Tinel's or V[place] sign.    VASC:  Skin temperature Warm to Warm proximal to distal sabi, increased warmth to right 2nd toe, left 2nd MTPJ  DP pulses 2/4 Right, 2/4 Left  PT pulses 2/4 Right, 2/4 Left  CFT 3 sec sabi  Pedal hair growth present  1+ pitting edema noted sabi - improved  Varicosities absent sabi    MUSC/SKEL:  Muscle Strength Right foot Dorsiflexors 5/5, Plantarflexors 5/5, Evertors 5/5, Invertors 5/5  Muscle Strength Left foot Dorsiflexors 5/5, Plantarflexors 5/5, Evertors 5/5, Invertors 5/5  ROM of the 1st MTPJ sabi is greatly decreased with mild pain and crepitus  ROM of the MTJ is full without pain or crepitus    ROM of the STJ is full without pain or crepitus     ROM of the ankle joint is full with light pain and no crepitus    No POP of right 2nd toe, left 2nd MTPJ, anterior ankle sabi  Tophus type changes to right 2nd DIPJ   Rectus foot type to right  Increased medial and dorsal bony prominence to 1st MTPJ sabi  Gait pattern: Normal  C-shaped left foot with adduction of digits    DERM:  Pedal nails x10 are within normal  limits of length and thickness  Webspaces are Clean, Dry, and Intact  Skin is normal in turgor and texture with no open wounds or sores appreciated.  Mild fading erythema to left 2nd MTPJ, no fluctuance or crepitus noted  Light erythema noted to dorsal right 2nd digit with light swelling and mild enlargement of joints, no fluctuance or crepitus noted.        RADIOLOGY/NUCLEAR:  Xr Ankle 3+ View Bilateral    Result Date: 9/12/2017  Narrative: EXAMINATION: XR ANKLE 3+ VW BILATERAL- 9/12/2017 12:40 PM CDT  HISTORY: Bilateral ankle pain.  REPORT: 3 views of each ankle were obtained. There are no comparison studies.  On the left, alignment is normal. No fractures identified. The joint spaces are preserved. There is mild circumferential soft tissue swelling.  On the right, there is also mild circumferential soft tissue swelling and normal bony alignment without fracture. The joint spaces are preserved on the right. On the lateral view of the right ankle there is moderate dorsal spurring at the mid foot.      Impression: No acute osseous abnormality. Degenerative spurring of the midfoot dorsally on the right is noted. There is circumferential soft tissue swelling at each ankle. This report was finalized on 09/12/2017 12:41 by Dr. Marvin Mar MD.    Xr Foot 3+ View Bilateral    Result Date: 9/12/2017  Narrative: EXAMINATION: XR FOOT 3+ VW BILATERAL- 9/12/2017 12:41 PM CDT  HISTORY: Bilateral foot pain.  REPORT: 3 views of each foot were obtained. There are no comparison studies.  On the right, there is severe narrowing and sclerosis with spurring at the first metatarsophalangeal joint. There is bony remodeling at this location. No acute fractures identified. The other joint spaces are preserved. Is mild soft tissue swelling adjacent to the fifth metatarsophalangeal joint. On the lateral view, dorsal spurring is present at the first MCP joint and midfoot. There is dorsal soft tissue swelling.  On the left, there are  similar changes, with severe narrowing at the first metatarsophalangeal joint with sclerosis and spurring, not as advanced as on the right. This also spurring at the head of the second metatarsal with mild subluxation of the second metatarsophalangeal joint. No fractures seen on the left. There is a small exostosis at the proximal shaft of the proximal phalanx of the third digit. This appears benign. The other joint spaces are preserved. There is moderate dorsal soft tissue swelling of the left foot.      Impression: Severe osteoarthritis involving the first metatarsophalangeal joints with extensive spurring. Mild degenerative changes at the head of the second left metatarsal with mild subluxation of the second metatarsophalangeal joint. There is a small benign-appearing exostosis at the proximal shaft of the proximal phalanx of the third left digit. Dorsal soft tissue swelling is present bilaterally, greater on the left. This report was finalized on 09/12/2017 12:45 by Dr. Marvin Mar MD.      LABORATORY/CULTURE RESULTS:      PATHOLOGY RESULTS:       ASSESSMENT/PLAN     Lukasz was seen today for follow-up.    Diagnoses and all orders for this visit:    Foot pain, bilateral    Foot deformity, left    Chronic gout of right foot, unspecified cause      Comprehensive lower extremity examination and evaluation was performed.  Discussed findings and treatment plan including risks, benefits, and treatment options with patient in detail. Patient agreed with treatment plan.  Given multiple ailments of the foot and ankle, will systematically treat each starting with acute gout flare with use of colchicine, take as written. Upon re-evaluation, will consider PT for LE strengthening s/p seizure/coma. Arthritic changes and foot deformity may contribute to pt's pain but will be the last to be treated.   At this time no further treatment needed as gout appears to be resolved and no longer has weakness in feet and legs.   An  After Visit Summary was printed and given to the patient at discharge, including (if requested) any available informative/educational handouts regarding diagnosis, treatment, or medications. All questions were answered to patient/family satisfaction. Should symptoms fail to improve or worsen they agree to call or return to clinic or to go to the Emergency Department. Discussed the importance of following up with any needed screening tests/labs/specialist appointments and any requested follow-up recommended by me today. Importance of maintaining follow-up discussed and patient accepts that missed appointments can delay diagnosis and potentially lead to worsening of conditions.  Return if symptoms worsen or fail to improve., or sooner if acute issues arise.        This document has been electronically signed by Abebe Vines DPM on September 29, 2017 9:56 AM

## 2017-10-03 ENCOUNTER — HOSPITAL ENCOUNTER (OUTPATIENT)
Dept: NEUROLOGY | Facility: HOSPITAL | Age: 52
Discharge: HOME | End: 2017-10-03
Admitting: CLINICAL NURSE SPECIALIST

## 2017-10-03 DIAGNOSIS — G40.309 GENERALIZED SEIZURE DISORDER (HCC): ICD-10-CM

## 2017-10-03 DIAGNOSIS — I67.83 POSTERIOR REVERSIBLE ENCEPHALOPATHY SYNDROME (PRES): ICD-10-CM

## 2017-10-03 PROCEDURE — 95816 EEG AWAKE AND DROWSY: CPT

## 2017-10-03 PROCEDURE — 95816 EEG AWAKE AND DROWSY: CPT | Performed by: PSYCHIATRY & NEUROLOGY

## 2017-10-05 DIAGNOSIS — R60.0 LOCALIZED EDEMA: ICD-10-CM

## 2017-10-05 RX ORDER — POTASSIUM CHLORIDE 750 MG/1
10 TABLET, EXTENDED RELEASE ORAL DAILY
Qty: 30 TABLET | Refills: 1 | Status: SHIPPED | OUTPATIENT
Start: 2017-10-05 | End: 2017-11-06 | Stop reason: SDUPTHER

## 2017-10-05 RX ORDER — FUROSEMIDE 20 MG/1
20 TABLET ORAL DAILY
Qty: 30 TABLET | Refills: 1 | Status: SHIPPED | OUTPATIENT
Start: 2017-10-05 | End: 2017-11-06 | Stop reason: SDUPTHER

## 2017-10-11 ENCOUNTER — OFFICE VISIT (OUTPATIENT)
Dept: FAMILY MEDICINE CLINIC | Facility: CLINIC | Age: 52
End: 2017-10-11

## 2017-10-11 VITALS
HEIGHT: 68 IN | SYSTOLIC BLOOD PRESSURE: 134 MMHG | WEIGHT: 203.2 LBS | RESPIRATION RATE: 18 BRPM | DIASTOLIC BLOOD PRESSURE: 77 MMHG | OXYGEN SATURATION: 98 % | HEART RATE: 82 BPM | BODY MASS INDEX: 30.8 KG/M2 | TEMPERATURE: 98 F

## 2017-10-11 DIAGNOSIS — Z87.891 FORMER SMOKER: ICD-10-CM

## 2017-10-11 DIAGNOSIS — T50.905A DRUG-INDUCED HYPOKALEMIA: ICD-10-CM

## 2017-10-11 DIAGNOSIS — I10 HYPERTENSION, UNSPECIFIED TYPE: ICD-10-CM

## 2017-10-11 DIAGNOSIS — E87.6 DRUG-INDUCED HYPOKALEMIA: ICD-10-CM

## 2017-10-11 DIAGNOSIS — Z28.21 INFLUENZA VACCINATION DECLINED: ICD-10-CM

## 2017-10-11 DIAGNOSIS — M1A.0790 IDIOPATHIC CHRONIC GOUT OF ANKLE WITHOUT TOPHUS, UNSPECIFIED LATERALITY: ICD-10-CM

## 2017-10-11 DIAGNOSIS — R60.9 PERIPHERAL EDEMA: Primary | ICD-10-CM

## 2017-10-11 PROCEDURE — 99214 OFFICE O/P EST MOD 30 MIN: CPT | Performed by: FAMILY MEDICINE

## 2017-10-11 RX ORDER — CLONIDINE HYDROCHLORIDE 0.1 MG/1
0.1 TABLET ORAL NIGHTLY
Qty: 60 TABLET | Refills: 2 | Status: SHIPPED | OUTPATIENT
Start: 2017-10-11 | End: 2018-05-16 | Stop reason: SDUPTHER

## 2017-10-11 RX ORDER — SPIRONOLACTONE 25 MG/1
25 TABLET ORAL DAILY
Qty: 30 TABLET | Refills: 1 | Status: SHIPPED | OUTPATIENT
Start: 2017-10-11 | End: 2017-11-20 | Stop reason: SDUPTHER

## 2017-10-11 NOTE — PROGRESS NOTES
Chief Complaint   Patient presents with   • Consult     Patient is having trouble with his legs swelling. He said he has had several test done to see why they are swelling and noone knows what is causing the swelling. He said that he has been dealing with this since July 2017.         History:  Luaksz Savage is a 52 y.o. male presents who today for evaluation of the above problems.  PCP currently listed as Linda Hoffman, DNP, APRN.   The patient has been having swelling bilateral legs since middle of July.  The patient has been treated for gout, he has started colchicine.  He has been here previously and was sent for evaluation of circulation.  [place]  New sx pressure and numbness/tingling of legs along the outer aspect of bilateral legs.  He has been on norvasc since July.  We discussed SE of this medication.  Similar symptoms back to July, Doppler bilateral LE completed and negative.  He has history of HTN.  He is on coreg 25 mg BID.  He is on losartan 100mg daily.  He is also on clonidine as needed.  I will start him on aldactone 12.5mg, stop norvasc today.   Last K+ was 3.8.  We will check another potassium on Monday.  Discussed we may be able to stop the K+ as well.  We will check this on Monday and consider stopping it if needed. Check BP 4 x daily x 1 week.  IF >180 SBP take a clonidine.  Caution with driving.  We talked about venous insufficiency, compression hosing. He has these but not using them.  Stopped using them when this got better.  I recommended again to resume using these.  He does smoke.  He quit 07/2017.  Seizure x 1.      Lab Results   Component Value Date    K 3.8 08/18/2017         Lukasz Savage  has a past medical history of Angio-edema (7/3/2017); Anxiety; Arthritis; Clostridium difficile colitis; Erectile dysfunction (10/6/2016); Gout; HCAP (healthcare-associated pneumonia) (7/11/2017); Hyperlipidemia; Malignant hypertension; MSSA (methicillin susceptible Staphylococcus  aureus) infection (7/31/2017); Obstructive sleep apnea; and Seizures (7/4/2017).    Allergies   Allergen Reactions   • Lipitor [Atorvastatin] Rash   • Lisinopril Angioedema     Past Medical History:   Diagnosis Date   • Angio-edema 7/3/2017   • Anxiety    • Arthritis    • Clostridium difficile colitis    • Erectile dysfunction 10/6/2016   • Gout    • HCAP (healthcare-associated pneumonia) 7/11/2017   • Hyperlipidemia    • Malignant hypertension    • MSSA (methicillin susceptible Staphylococcus aureus) infection 7/31/2017   • Obstructive sleep apnea    • Seizures 7/4/2017     Past Surgical History:   Procedure Laterality Date   • TRACHEOSTOMY N/A 7/12/2017    Procedure: TRACHEOSTOMY WITH TRACHEOSCOPY;  Surgeon: Jairon Pierson MD;  Location: [facility];  Service:      Family History   Problem Relation Age of Onset   • Hypertension Mother    • Diabetes Mother    • Heart disease Father    • Hypertension Father    • No Known Problems Daughter    • No Known Problems Son    • Diabetes Maternal Grandmother    • No Known Problems Maternal Grandfather    • No Known Problems Paternal Grandmother    • Heart attack Paternal Grandfather    • Kidney disease Sister        Current Outpatient Prescriptions on File Prior to Visit   Medication Sig Dispense Refill   • amLODIPine (NORVASC) 10 MG tablet Take 1 tablet by mouth Daily. 30 tablet 1   • carvedilol (COREG) 25 MG tablet Take 1 tablet by mouth Every 12 (Twelve) Hours. 60 tablet 1   • CloNIDine (CATAPRES) 0.1 MG tablet Take 1 tablet by mouth Every Night. 60 tablet 2   • colchicine 0.6 MG tablet Take 2 pills now, 1 pill in one hr.  Then Can take daily 14 tablet 3   • furosemide (LASIX) 20 MG tablet Take 1 tablet by mouth Daily. 30 tablet 1   • ibuprofen (ADVIL,MOTRIN) 600 MG tablet Take 1 tablet by mouth Every 6 (Six) Hours As Needed for Mild Pain . 90 tablet 1   • losartan (COZAAR) 100 MG tablet Take 1 tablet by mouth Daily. 30 tablet 1   • omeprazole (PRILOSEC) 10 MG capsule Take  10 mg by mouth Daily.     • potassium chloride (K-DUR,KLOR-CON) 10 MEQ CR tablet Take 1 tablet by mouth Daily. 30 tablet 1   • raNITIdine (ZANTAC) 75 MG tablet Take 75 mg by mouth 2 (Two) Times a Day.     • vardenafil (LEVITRA) 10 MG tablet Take 1 tablet by mouth Daily As Needed for erectile dysfunction. 10 tablet 5     No current facility-administered medications on file prior to visit.        Family history, surgical history, past medical history, Allergies and meds reviewed with patient today and updated in [facility] EMR.     ROS:  Review of Systems   Constitutional: Negative for activity change, appetite change, chills, diaphoresis, fatigue and fever.   HENT: Negative for congestion, ear discharge, ear pain, facial swelling, hearing loss, rhinorrhea, sinus pressure, sneezing, sore throat and tinnitus.    Eyes: Negative for pain, discharge, redness and visual disturbance.   Respiratory: Negative for apnea, cough, choking, chest tightness, shortness of breath and wheezing.    Cardiovascular: Positive for leg swelling. Negative for chest pain and palpitations.        HTN, obesity w/ central adiposity.   Gastrointestinal: Negative for abdominal pain, constipation, diarrhea, nausea and vomiting.   Genitourinary: Negative for difficulty urinating, flank pain, frequency, penile pain and urgency.   Musculoskeletal: Negative for arthralgias, back pain, gait problem, joint swelling, myalgias and neck pain.   Skin: Negative for color change, pallor and rash.   Allergic/Immunologic: Negative for environmental allergies and food allergies.   Neurological: Negative for dizziness, seizures, weakness, numbness and headaches.   Hematological: Negative for adenopathy. Does not bruise/bleed easily.   Psychiatric/Behavioral: Negative for agitation, behavioral problems, confusion, hallucinations, self-injury, sleep disturbance and suicidal ideas.       OBJECTIVE:  Vitals:    10/11/17 1445   BP: 134/77   Pulse: 82   Resp: 18   Temp: 98 °F  "(36.7 °C)   SpO2: 98%   Weight: 203 lb 3.2 oz (92.2 kg)   Height: 68\" (172.7 cm)     Physical Exam   Constitutional: He is oriented to person, place, and time. He appears well-developed and well-nourished.   HENT:   Head: Normocephalic and atraumatic.   Right Ear: External ear normal.   Left Ear: External ear normal.   Nose: Nose normal.   Mouth/Throat: Oropharynx is clear and moist.   Eyes: Conjunctivae and EOM are normal. Pupils are equal, round, and reactive to light.   Neck: Normal range of motion. Neck supple. No thyromegaly present.   Cardiovascular: Normal rate, regular rhythm, normal heart sounds and intact distal pulses.    No murmur heard.  Pulmonary/Chest: Effort normal and breath sounds normal. No respiratory distress. He has no wheezes. He exhibits no tenderness.   Abdominal: Soft. Bowel sounds are normal. There is no tenderness. There is no rebound and no guarding.   No fluid wave, no ascites.   Musculoskeletal: Normal range of motion. He exhibits no tenderness.        Right lower leg: He exhibits swelling.        Left lower leg: He exhibits swelling.        Right foot: There is swelling.        Left foot: There is swelling.   Peripheral edema 2+ bilateral LE to mid shin and 1+ to knee.    Lymphadenopathy:     He has no cervical adenopathy.   Neurological: He is alert and oriented to person, place, and time. No cranial nerve deficit. Coordination normal.   Skin: Skin is warm and dry. No rash noted.   Psychiatric: He has a normal mood and affect. His behavior is normal. Judgment and thought content normal.   Nursing note and vitals reviewed.      Assessment/Plan:  Peripheral edema: Stop norvasc, will add aldactone 25mg daily.  Repeat K+ Monday.  Consider stopping K+ if 4.0 or greater.  Continue lasix as needed. Wear compression hosing.  If worsening pain, if worsening pressure, needs to f/u in office or go to ER.  Up and down in sx since 07/2017.  Vein mapping study to be considered as next line.  As " stated labs on Monday f/u with mukesh next week for reassessment of lower extremity edema.  He has no breathing issues. NO major changes in weight.  He notes sx are up and down. DASH diet discussed with patient.  Hosing d/w patient.  Compliance d/w patient. Seizure risk d/w pt. He was on keppra.  No driving at present.  Discussed Seizure precaution.  Following with Dr. Rasmussen.      HTN: Suspect essential HTN. Good BP control is encouraged with Goal BP based on JNC 8 guidelines:  For the general population <60 yr old goal BP <140/90 and for those >60 <150/90.  For patients of all ages with Diabetes, CKD, Known CAD the goal is <140/90. Reviewed typical first line agents to include thiazide diuretic or ace-I or ARB or CCB alone or in combo. At goal yes.  If not already owned, I recommended to the patient to obtain electronic home BP machine with upper arm blood pressure cuff and to check regularly as instructed.  Keep BP log and bring to subsequent visits.    · Offered handout on HTN educational topics: HTN, Low sodium diet.   These were provided if patient requested these today.  · ADA diet encouraged.  · Monitor cholesterol regularly  · Monitor weight with goal of BMI <30.  · Consider taking Rx at night as recent study supports this may decreased chances of leading to DM.   · Consider bringing home BP cuff to office to compare readings with ours.  · Checking BP at home can help to lower BP.  Certain medications and substances can increase BP to include: NSAIDS, caffeine, decongestants, nicotine.    · Well controlled, BP goal for age is <140/90 per JNC 8 guidelines and stop norvasc as SE is peripheral edema.  We will add spironolactone. Monitor K+ closely.      Discussed the federal guidelines suggest a healthy goal BMI of 18.5-24.9 for people 18-65 and 23-30 for people age 65 and older.  Overweight is considered BMI 25-30, Obesity 30-40 and Morbid obesity is defined as >100 lb overweight or BMI >40.  With a BMI  above goal, it is recommended to utilize a diet/exercise program to get back into the appropriate range.  Consider referral to dietician.  For BMI >40 consider referral to bariatrics.    · If not already monitoring intake.  I would recommend at least to keep a food diary.  Document everything that is consumed into a food diary.  Studies have shown that patients can lose up to 2x the weight by keeping track of foods.    · Offered handout on weight loss techniques.   · Apps that may be of benefit include [employer] Pal, Lose it.    · Regular exercise encouraged.  Start with walking 5-10 minutes at a pace that is difficult to carry a conversation. If chest pain/SOA stop and f/u in office.    · Would encourage that you discuss the above further with PCP.      Orders Placed Today:  Lukasz was seen today for consult.    Diagnoses and all orders for this visit:    Peripheral edema  -     Comprehensive Metabolic Panel  -     Magnesium    Idiopathic chronic gout of ankle without tophus, unspecified laterality    Hypertension, unspecified type  -     CloNIDine (CATAPRES) 0.1 MG tablet; Take 1 tablet by mouth Every Night. Check BP 4x daily. If> 180 SBP take x1.  -     Comprehensive Metabolic Panel  -     Magnesium    Drug-induced hypokalemia    Former smoker    Influenza vaccination declined    BMI 30.0-30.9,adult    Other orders  -     spironolactone (ALDACTONE) 25 MG tablet; Take 1 tablet by mouth Daily.        Risks/benefits of current and new medications discussed with the patient and or family today.  The patient/family are aware and accept that if there any side effects they should call or return to clinic as soon as possible.  Appropriate F/U discussed for topics addressed today. All questions were answered to the satisfactory state of patient/family.  Should symptoms fail to improve or worsen they agree to call or return to clinic or to go to the ER. Education handouts were offered on any new Rx if requested.   Discussed the importance of following up with any needed screening tests/labs/specialist appointments and any requested follow-up recommended by me today.  Importance of maintaining follow-up discussed and patient accepts that missed appointments can delay diagnosis and potentially lead to worsening of conditions.    An After Visit Summary was printed and given to the patient at discharge.  Follow-up: No Follow-up on file.         Tommie Almeida M.D. 10/11/2017

## 2017-10-11 NOTE — PATIENT INSTRUCTIONS
Spironolactone tablets  What is this medicine?  SPIRONOLACTONE (lupillo on oh LAK tone) is a diuretic. It helps you make more urine and to lose excess water from your body. This medicine is used to treat high blood pressure, and edema or swelling from heart, kidney, or liver disease. It is also used to treat patients who make too much aldosterone or have low potassium.  This medicine may be used for other purposes; ask your health care provider or pharmacist if you have questions.  COMMON BRAND NAME(S): Aldactone  What should I tell my health care provider before I take this medicine?  They need to know if you have any of these conditions:  -high blood level of potassium  -kidney disease or trouble making urine  -liver disease  -an unusual or allergic reaction to spironolactone, other medicines, foods, dyes, or preservatives  -pregnant or trying to get pregnant  -breast-feeding  How should I use this medicine?  Take this medicine by mouth with a drink of water. Follow the directions on your prescription label. You can take it with or without food. If it upsets your stomach, take it with food. Do not take your medicine more often than directed. Remember that you will need to pass more urine after taking this medicine. Do not take your doses at a time of day that will cause you problems. Do not take at bedtime.  Talk to your pediatrician regarding the use of this medicine in children. While this drug may be prescribed for selected conditions, precautions do apply.  Overdosage: If you think you have taken too much of this medicine contact a poison control center or emergency room at once.  NOTE: This medicine is only for you. Do not share this medicine with others.  What if I miss a dose?  If you miss a dose, take it as soon as you can. If it is almost time for your next dose, take only that dose. Do not take double or extra doses.  What may interact with this medicine?  Do not take this medicine with any of the  following medications:  -eplerenone  This medicine may also interact with the following medications:  -corticosteroids  -digoxin  -lithium  -medicines for high blood pressure like ACE inhibitors  -skeletal muscle relaxants like tubocurarine  -NSAIDs, medicines for pain and inflammation, like ibuprofen or naproxen  -potassium products like salt substitute or supplements  -pressor amines like norepinephrine  -some diuretics  This list may not describe all possible interactions. Give your health care provider a list of all the medicines, herbs, non-prescription drugs, or dietary supplements you use. Also tell them if you smoke, drink alcohol, or use illegal drugs. Some items may interact with your medicine.  What should I watch for while using this medicine?  Visit your doctor or health care professional for regular checks on your progress. Check your blood pressure as directed. Ask your doctor what your blood pressure should be, and when you should contact them.  You may need to be on a special diet while taking this medicine. Ask your doctor. Also, ask how many glasses of fluid you need to drink a day. You must not get dehydrated.  This medicine may make you feel confused, dizzy or lightheaded. Drinking alcohol and taking some medicines can make this worse. Do not drive, use machinery, or do anything that needs mental alertness until you know how this medicine affects you. Do not sit or stand up quickly.  What side effects may I notice from receiving this medicine?  Side effects that you should report to your doctor or health care professional as soon as possible:  -allergic reactions such as skin rash or itching, hives, swelling of the lips, mouth, tongue, or throat  -black or tarry stools  -fast, irregular heartbeat  -fever  -muscle pain, cramps  -numbness, tingling in hands or feet  -trouble breathing  -trouble passing urine  -unusual bleeding  -unusually weak or tired  Side effects that usually do not require  medical attention (report to your doctor or health care professional if they continue or are bothersome):  -change in voice or hair growth  -confusion  -dizzy, drowsy  -dry mouth, increased thirst  -enlarged or tender breasts  -headache  -irregular menstrual periods  -sexual difficulty, unable to have an erection  -stomach upset  This list may not describe all possible side effects. Call your doctor for medical advice about side effects. You may report side effects to FDA at 9-071-FDA-6885.  Where should I keep my medicine?  Keep out of the reach of children.  Store below 25 degrees C (77 degrees F). Throw away any unused medicine after the expiration date.  NOTE: This sheet is a summary. It may not cover all possible information. If you have questions about this medicine, talk to your doctor, pharmacist, or health care provider.     © 2017, [employer]. (2011-08-30 12:51:30)    Peripheral Edema  Peripheral edema is swelling that is caused by a buildup of fluid. Peripheral edema most often affects the lower legs, ankles, and feet. It can also develop in the arms, hands, and face. The area of the body that has peripheral edema will look swollen. It may also feel heavy or warm. Your clothes may start to feel tight. Pressing on the area may make a temporary dent in your skin. You may not be able to move your arm or leg as much as usual.  There are many causes of peripheral edema. It can be a complication of other diseases, such as congestive heart failure, kidney disease, or a problem with your blood circulation. It also can be a side effect of certain medicines. It often happens to women during pregnancy. Sometimes, the cause is not known. Treating the underlying condition is often the only treatment for peripheral edema.  HOME CARE INSTRUCTIONS  Pay attention to any changes in your symptoms. Take these actions to help with your discomfort:  · Raise (elevate) your legs while you are sitting or lying  down.  · Move around often to prevent stiffness and to lessen swelling. Do not sit or stand for long periods of time.  · Wear support stockings as told by your health care provider.  · Follow instructions from your health care provider about limiting salt (sodium) in your diet. Sometimes eating less salt can reduce swelling.  · Take over-the-counter and prescription medicines only as told by your health care provider. Your health care provider may prescribe medicine to help your body get rid of excess water (diuretic).  · Keep all follow-up visits as told by your health care provider. This is important.  SEEK MEDICAL CARE IF:  · You have a fever.  · Your edema starts suddenly or is getting worse, especially if you are pregnant or have a medical condition.  · You have swelling in only one leg.  · You have increased swelling and pain in your legs.  SEEK IMMEDIATE MEDICAL CARE IF:  · You develop shortness of breath, especially when you are lying down.  · You have pain in your chest or abdomen.  · You feel weak.  · You faint.     This information is not intended to replace advice given to you by your health care provider. Make sure you discuss any questions you have with your health care provider.     Document Released: 01/25/2006 Document Revised: 04/10/2017 Document Reviewed: 06/29/2016  [employer] Interactive Patient Education ©2017 [employer]    Venous Stasis or Chronic Venous Insufficiency  Chronic venous insufficiency, also called venous stasis, is a condition that affects the veins in the legs. The condition prevents blood from being pumped through these veins effectively. Blood may no longer be pumped effectively from the legs back to the heart. This condition can range from mild to severe. With proper treatment, you should be able to continue with an active life.  CAUSES   Chronic venous insufficiency occurs when the vein walls become stretched, weakened, or damaged or when valves within the vein are damaged.  Some common causes of this include:  · High blood pressure inside the veins (venous hypertension).  · Increased blood pressure in the leg veins from long periods of sitting or standing.  · A blood clot that blocks blood flow in a vein (deep vein thrombosis).  · Inflammation of a superficial vein (phlebitis) that causes a blood clot to form.  RISK FACTORS  Various things can make you more likely to develop chronic venous insufficiency, including:  · Family history of this condition.  · Obesity.  · Pregnancy.  · Sedentary lifestyle.  · Smoking.  · Jobs requiring long periods of standing or sitting in one place.  · Being a certain age. Women in their 40s and 50s and men in their 70s are more likely to develop this condition.  SIGNS AND SYMPTOMS   Symptoms may include:   · Varicose veins.  · Skin breakdown or ulcers.  · Reddened or discolored skin on the leg.  · Brown, smooth, tight, and painful skin just above the ankle, usually on the inside surface (lipodermatosclerosis).  · Swelling.  DIAGNOSIS   To diagnose this condition, your health care provider will take a medical history and do a physical exam. The following tests may be ordered to confirm the diagnosis:  · Duplex ultrasound--A procedure that produces a picture of a blood vessel and nearby organs and also provides information on blood flow through the blood vessel.  · Plethysmography--A procedure that tests blood flow.  · A venogram, or venography--A procedure used to look at the veins using X-ray and dye.  TREATMENT  The goals of treatment are to help you return to an active life and to minimize pain or disability. Treatment will depend on the severity of the condition. Medical procedures may be needed for severe cases. Treatment options may include:   · Use of compression stockings. These can help with symptoms and lower the chances of the problem getting worse, but they do not cure the problem.  · Sclerotherapy--A procedure involving an injection of a  "material that \"dissolves\" the damaged veins. Other veins in the network of blood vessels take over the function of the damaged veins.  · Surgery to remove the vein or cut off blood flow through the vein (vein stripping or laser ablation surgery).  · Surgery to repair a valve.  HOME CARE INSTRUCTIONS   · Wear compression stockings as directed by your health care provider.  · Only take over-the-counter or prescription medicines for pain, discomfort, or fever as directed by your health care provider.  · Follow up with your health care provider as directed.  SEEK MEDICAL CARE IF:   · You have redness, swelling, or increasing pain in the affected area.  · You see a red streak or line that extends up or down from the affected area.  · You have a breakdown or loss of skin in the affected area, even if the breakdown is small.  · You have an injury to the affected area.  SEEK IMMEDIATE MEDICAL CARE IF:   · You have an injury and open wound in the affected area.  · Your pain is severe and does not improve with medicine.  · You have sudden numbness or weakness in the foot or ankle below the affected area, or you have trouble moving your foot or ankle.  · You have a fever or persistent symptoms for more than 2-3 days.  · You have a fever and your symptoms suddenly get worse.  MAKE SURE YOU:   · Understand these instructions.  · Will watch your condition.  · Will get help right away if you are not doing well or get worse.     This information is not intended to replace advice given to you by your health care provider. Make sure you discuss any questions you have with your health care provider.     Document Released: 04/22/2008 Document Revised: 10/08/2014 Document Reviewed: 08/25/2014  [employer] Interactive Patient Education ©2017 [employer]  Calorie Counting for Weight Loss  Calories are energy you get from the things you eat and drink. Your body uses this energy to keep you going throughout the day. The number of calories " you eat affects your weight. When you eat more calories than your body needs, your body stores the extra calories as fat. When you eat fewer calories than your body needs, your body burns fat to get the energy it needs.  Calorie counting means keeping track of how many calories you eat and drink each day. If you make sure to eat fewer calories than your body needs, you should lose weight. In order for calorie counting to work, you will need to eat the number of calories that are right for you in a day to lose a healthy amount of weight per week. A healthy amount of weight to lose per week is usually 1-2 lb (0.5-0.9 kg). A dietitian can determine how many calories you need in a day and give you suggestions on how to reach your calorie goal.   WHAT IS MY MY PLAN?  My goal is to have __________ calories per day.   If I have this many calories per day, I should lose around __________ pounds per week.  WHAT DO I NEED TO KNOW ABOUT CALORIE COUNTING?  In order to meet your daily calorie goal, you will need to:  · Find out how many calories are in each food you would like to eat. Try to do this before you eat.  · Decide how much of the food you can eat.  · Write down what you ate and how many calories it had. Doing this is called keeping a food log.  WHERE DO I FIND CALORIE INFORMATION?  The number of calories in a food can be found on a Nutrition Facts label. Note that all the information on a label is based on a specific serving of the food. If a food does not have a Nutrition Facts label, try to look up the calories online or ask your dietitian for help.  HOW DO I DECIDE HOW MUCH TO EAT?  To decide how much of the food you can eat, you will need to consider both the number of calories in one serving and the size of one serving. This information can be found on the Nutrition Facts label. If a food does not have a Nutrition Facts label, look up the information online or ask your dietitian for help.  Remember that calories  are listed per serving. If you choose to have more than one serving of a food, you will have to multiply the calories per serving by the amount of servings you plan to eat. For example, the label on a package of bread might say that a serving size is 1 slice and that there are 90 calories in a serving. If you eat 1 slice, you will have eaten 90 calories. If you eat 2 slices, you will have eaten 180 calories.  HOW DO I KEEP A FOOD LOG?  After each meal, record the following information in your food log:  · What you ate.  · How much of it you ate.  · How many calories it had.  · Then, add up your calories.  Keep your food log near you, such as in a small notebook in your pocket. Another option is to use a mobile marta or website. Some programs will calculate calories for you and show you how many calories you have left each time you add an item to the log.  WHAT ARE SOME CALORIE COUNTING TIPS?  · Use your calories on foods and drinks that will fill you up and not leave you hungry. Some examples of this include foods like nuts and nut butters, vegetables, lean proteins, and high-fiber foods (more than 5 g fiber per serving).  · Eat nutritious foods and avoid empty calories. Empty calories are calories you get from foods or beverages that do not have many nutrients, such as candy and soda. It is better to have a nutritious high-calorie food (such as an avocado) than a food with few nutrients (such as a bag of chips).  · Know how many calories are in the foods you eat most often. This way, you do not have to look up how many calories they have each time you eat them.  · Look out for foods that may seem like low-calorie foods but are really high-calorie foods, such as baked goods, soda, and fat-free candy.  · Pay attention to calories in drinks. Drinks such as sodas, specialty coffee drinks, alcohol, and juices have a lot of calories yet do not fill you up. Choose low-calorie drinks like water and diet drinks.  · Focus your  calorie counting efforts on higher calorie items. Logging the calories in a garden salad that contains only vegetables is less important than calculating the calories in a milk shake.  · Find a way of tracking calories that works for you. Get creative. Most people who are successful find ways to keep track of how much they eat in a day, even if they do not count every calorie.  WHAT ARE SOME PORTION CONTROL TIPS?  · Know how many calories are in a serving. This will help you know how many servings of a certain food you can have.  · Use a measuring cup to measure serving sizes. This is helpful when you start out. With time, you will be able to estimate serving sizes for some foods.  · Take some time to put servings of different foods on your favorite plates, bowls, and cups so you know what a serving looks like.  · Try not to eat straight from a bag or box. Doing this can lead to overeating. Put the amount you would like to eat in a cup or on a plate to make sure you are eating the right portion.  · Use smaller plates, glasses, and bowls to prevent overeating. This is a quick and easy way to practice portion control. If your plate is smaller, less food can fit on it.  · Try not to multitask while eating, such as watching TV or using your computer. If it is time to eat, sit down at a table and enjoy your food. Doing this will help you to start recognizing when you are full. It will also make you more aware of what and how much you are eating.  HOW CAN I CALORIE COUNT WHEN EATING OUT?  · Ask for smaller portion sizes or child-sized portions.  · Consider sharing an entree and sides instead of getting your own entree.  · If you get your own entree, eat only half. Ask for a box at the beginning of your meal and put the rest of your entree in it so you are not tempted to eat it.  · Look for the calories on the menu. If calories are listed, choose the lower calorie options.  · Choose dishes that include vegetables, fruits,  "whole grains, low-fat dairy products, and lean protein. Focusing on smart food choices from each of the 5 food groups can help you stay on track at restaurants.  · Choose items that are boiled, broiled, grilled, or steamed.  · Choose water, milk, unsweetened iced tea, or other drinks without added sugars. If you want an alcoholic beverage, choose a lower calorie option. For example, a regular jose ramon can have up to 700 calories and a glass of wine has around 150.  · Stay away from items that are buttered, battered, fried, or served with cream sauce. Items labeled \"crispy\" are usually fried, unless stated otherwise.  · Ask for dressings, sauces, and syrups on the side. These are usually very high in calories, so do not eat much of them.  · Watch out for salads. Many people think salads are a healthy option, but this is often not the case. Many salads come with bradley, fried chicken, lots of cheese, fried chips, and dressing. All of these items have a lot of calories. If you want a salad, choose a garden salad and ask for grilled meats or steak. Ask for the dressing on the side, or ask for olive oil and vinegar or lemon to use as dressing.  · Estimate how many servings of a food you are given. For example, a serving of cooked rice is ½ cup or about the size of half a tennis ball or one cupcake wrapper. Knowing serving sizes will help you be aware of how much food you are eating at restaurants. The list below tells you how big or small some common portion sizes are based on everyday objects.    1 oz--4 stacked dice.    3 oz--1 deck of cards.    1 tsp--1 dice.    1 Tbsp--½ a Ping-Pong ball.    2 Tbsp--1 Ping-Pong ball.    ½ cup--1 tennis ball or 1 cupcake wrapper.    1 cup--1 baseball.     This information is not intended to replace advice given to you by your health care provider. Make sure you discuss any questions you have with your health care provider.     Document Released: 12/18/2006 Document Revised: 01/08/2016 " Document Reviewed: 10/23/2014  [employer] Interactive Patient Education ©2017 [employer]    Exercising to Lose Weight  Exercising can help you to lose weight. In order to lose weight through exercise, you need to do vigorous-intensity exercise. You can tell that you are exercising with vigorous intensity if you are breathing very hard and fast and cannot hold a conversation while exercising.  Moderate-intensity exercise helps to maintain your current weight. You can tell that you are exercising at a moderate level if you have a higher heart rate and faster breathing, but you are still able to hold a conversation.  HOW OFTEN SHOULD I EXERCISE?  Choose an activity that you enjoy and set realistic goals. Your health care provider can help you to make an activity plan that works for you. Exercise regularly as directed by your health care provider. This may include:  · Doing resistance training twice each week, such as:    Push-ups.    Sit-ups.    Lifting weights.    Using resistance bands.  · Doing a given intensity of exercise for a given amount of time. Choose from these options:    150 minutes of moderate-intensity exercise every week.    75 minutes of vigorous-intensity exercise every week.    A mix of moderate-intensity and vigorous-intensity exercise every week.  Children, pregnant women, people who are out of shape, people who are overweight, and older adults may need to consult a health care provider for individual recommendations. If you have any sort of medical condition, be sure to consult your health care provider before starting a new exercise program.  WHAT ARE SOME ACTIVITIES THAT CAN HELP ME TO LOSE WEIGHT?   · Walking at a rate of at least 4.5 miles an hour.  · Jogging or running at a rate of 5 miles per hour.  · Biking at a rate of at least 10 miles per hour.  · Lap swimming.  · Roller-skating or in-line skating.  · Cross-country skiing.  · Vigorous competitive sports, such as football, basketball,  and soccer.  · Jumping rope.  · Aerobic dancing.  HOW CAN I BE MORE ACTIVE IN MY DAY-TO-DAY ACTIVITIES?  · Use the stairs instead of the elevator.  · Take a walk during your lunch break.  · If you drive, park your car farther away from work or school.  · If you take public transportation, get off one stop early and walk the rest of the way.  · Make all of your phone calls while standing up and walking around.  · Get up, stretch, and walk around every 30 minutes throughout the day.  WHAT GUIDELINES SHOULD I FOLLOW WHILE EXERCISING?  · Do not exercise so much that you hurt yourself, feel dizzy, or get very short of breath.  · Consult your health care provider prior to starting a new exercise program.  · Wear comfortable clothes and shoes with good support.  · Drink plenty of water while you exercise to prevent dehydration or heat stroke. Body water is lost during exercise and must be replaced.  · Work out until you breathe faster and your heart beats faster.     This information is not intended to replace advice given to you by your health care provider. Make sure you discuss any questions you have with your health care provider.     Document Released: 01/20/2012 Document Revised: 01/08/2016 Document Reviewed: 05/21/2015  [employer] Interactive Patient Education ©2017 [employer]

## 2017-10-12 ENCOUNTER — OFFICE VISIT (OUTPATIENT)
Dept: NEUROLOGY | Facility: CLINIC | Age: 52
End: 2017-10-12

## 2017-10-12 VITALS
HEART RATE: 102 BPM | RESPIRATION RATE: 18 BRPM | DIASTOLIC BLOOD PRESSURE: 100 MMHG | HEIGHT: 68 IN | OXYGEN SATURATION: 96 % | WEIGHT: 200 LBS | BODY MASS INDEX: 30.31 KG/M2 | SYSTOLIC BLOOD PRESSURE: 130 MMHG

## 2017-10-12 DIAGNOSIS — E78.5 HYPERLIPIDEMIA, UNSPECIFIED HYPERLIPIDEMIA TYPE: ICD-10-CM

## 2017-10-12 DIAGNOSIS — I10 HYPERTENSION, UNSPECIFIED TYPE: ICD-10-CM

## 2017-10-12 DIAGNOSIS — R56.9 SEIZURES (HCC): Primary | ICD-10-CM

## 2017-10-12 PROCEDURE — 99213 OFFICE O/P EST LOW 20 MIN: CPT | Performed by: CLINICAL NURSE SPECIALIST

## 2017-10-12 RX ORDER — RANITIDINE HCL 75 MG
75 TABLET ORAL 2 TIMES DAILY
Status: CANCELLED | OUTPATIENT
Start: 2017-10-12

## 2017-10-12 NOTE — PROGRESS NOTES
Subjective     Chief Complaint   Patient presents with   • Seizures     patient stated zero signs of another seizures since last visit         Nishant Savage is a 52 y.o. male right handed  at [employer] in [place]. He is here today for follow up for seizure in the setting of PRES. He is accompanied by himself. He did have repeat MRI brain that I have reviewed. He had stopped keppra about 4 weeks ago and had a repeat EEG 10/3/17 that was read as normal. Since stopping Keppra, he denies seizure, staring spell, involuntary tremor, tongue biting, and incontinence.   As you recall, He presented to [facility] ED after tongue swelling and seizure. He was admitted to ICU and BP was tactfully controlled. However, on day 2 the patient began to show signs of DT as he was not completely forthright with use of ETOH. Dr. ANTONIETA Campos, neurology [facility], and [facility] ist, then to get the patient through DTs to sedate and intubate. Unfortunately the patient developed healthcare associated pneumonia, C. Diff, and eventually required tracheostomy which patient had reversed last week. The patient was initially started on Keppra 1000 mg BID and at discharge was reduced to 500 mg BID. Since discharge he denies seizure, staring spell, incontinence, tongue biting, or involuntary tremor. BP have been improved but is have some elevated BP that is managed by PCP. He has also not resumed ETOH and has stopped smoking. Patient states for the most part has returned to his baseline but does have times notice some cognition changes.     Seizures    This is a new problem. Episode onset: 7/3/17. Associated symptoms include sleepiness and confusion. Pertinent negatives include no headaches, no sore throat, no chest pain, no nausea, no vomiting and no diarrhea. Characteristics include rhythmic jerking, loss of consciousness and bit tongue. Characteristics do not include apnea. angioedema The episode was witnessed. Possible causes include  change in alcohol use. ETOH, PRES        Current Outpatient Prescriptions   Medication Sig Dispense Refill   • carvedilol (COREG) 25 MG tablet Take 1 tablet by mouth Every 12 (Twelve) Hours. 60 tablet 1   • CloNIDine (CATAPRES) 0.1 MG tablet Take 1 tablet by mouth Every Night. Check BP 4x daily. If> 180 SBP take x1. 60 tablet 2   • colchicine 0.6 MG tablet Take 2 pills now, 1 pill in one hr.  Then Can take daily 14 tablet 3   • furosemide (LASIX) 20 MG tablet Take 1 tablet by mouth Daily. 30 tablet 1   • ibuprofen (ADVIL,MOTRIN) 600 MG tablet Take 1 tablet by mouth Every 6 (Six) Hours As Needed for Mild Pain . 90 tablet 1   • losartan (COZAAR) 100 MG tablet Take 1 tablet by mouth Daily. 30 tablet 1   • omeprazole (PRILOSEC) 10 MG capsule Take 10 mg by mouth Daily.     • potassium chloride (K-DUR,KLOR-CON) 10 MEQ CR tablet Take 1 tablet by mouth Daily. 30 tablet 1   • raNITIdine (ZANTAC) 75 MG tablet Take 75 mg by mouth 2 (Two) Times a Day.     • spironolactone (ALDACTONE) 25 MG tablet Take 1 tablet by mouth Daily. 30 tablet 1   • vardenafil (LEVITRA) 10 MG tablet Take 1 tablet by mouth Daily As Needed for erectile dysfunction. 10 tablet 5     No current facility-administered medications for this visit.        Past Medical History:   Diagnosis Date   • Angio-edema 7/3/2017   • Anxiety    • Arthritis    • Clostridium difficile colitis    • Erectile dysfunction 10/6/2016   • Gout    • HCAP (healthcare-associated pneumonia) 7/11/2017   • Hyperlipidemia    • Malignant hypertension    • MSSA (methicillin susceptible Staphylococcus aureus) infection 7/31/2017   • Obstructive sleep apnea    • Seizures 7/4/2017       Past Surgical History:   Procedure Laterality Date   • TRACHEOSTOMY N/A 7/12/2017    Procedure: TRACHEOSTOMY WITH TRACHEOSCOPY;  Surgeon: Jairon Pierson MD;  Location: [facility];  Service:        family history includes Diabetes in his maternal grandmother and mother; Heart attack in his paternal grandfather;  "Heart disease in his father; Hypertension in his father and mother; Kidney disease in his sister; No Known Problems in his daughter, maternal grandfather, paternal grandmother, and son.    Social History   Substance Use Topics   • Smoking status: Former Smoker     Packs/day: 0.33     Years: 30.00     Types: Cigarettes     Quit date: 7/3/2017   • Smokeless tobacco: Never Used   • Alcohol use No      Comment: PER DAY FOR 30 YEARS; Last drink July 3, 2017       Review of Systems   Constitutional: Negative.  Negative for fatigue and fever.   HENT: Negative.  Negative for rhinorrhea and sore throat.         Tracheostomy reversed 8/2017   Eyes: Negative.    Respiratory: Negative.  Negative for apnea, choking and shortness of breath.    Cardiovascular: Negative.  Negative for chest pain.   Gastrointestinal: Negative.  Negative for constipation, diarrhea, nausea and vomiting.   Endocrine: Negative.    Genitourinary: Negative.  Negative for dysuria and frequency.   Musculoskeletal: Negative for arthralgias and myalgias.   Skin: Negative.    Allergic/Immunologic: Negative.    Neurological: Positive for seizures and loss of consciousness. Negative for dizziness, tremors, weakness and headaches.   Hematological: Negative.  Negative for adenopathy.   Psychiatric/Behavioral: Positive for confusion. Negative for agitation and hallucinations.   All other systems reviewed and are negative.      Objective     /100 (BP Location: Left arm, Patient Position: Sitting, Cuff Size: Adult)  Pulse 102  Resp 18  Ht 68\" (172.7 cm)  Wt 200 lb (90.7 kg)  SpO2 96%  BMI 30.41 kg/m2, Body mass index is 30.41 kg/(m^2).    Physical Exam   Constitutional: He is oriented to person, place, and time. He appears well-developed and well-nourished.   HENT:   Head: Normocephalic and atraumatic.   Right Ear: External ear normal.   Left Ear: External ear normal.   Nose: Nose normal.   Mouth/Throat: Oropharynx is clear and moist.   Eyes: " Conjunctivae, EOM and lids are normal. Pupils are equal, round, and reactive to light.   Neck: Normal range of motion. Neck supple. Carotid bruit is not present.   Cardiovascular: Normal rate, regular rhythm, S1 normal, S2 normal and normal heart sounds.    Pulmonary/Chest: Effort normal and breath sounds normal.   Abdominal: Soft. Bowel sounds are normal.   Musculoskeletal: Normal range of motion.   Neurological: He is alert and oriented to person, place, and time. He has normal strength and normal reflexes. He displays no tremor. No cranial nerve deficit or sensory deficit. He exhibits normal muscle tone. He displays a negative Romberg sign. Coordination and gait normal. GCS eye subscore is 4. GCS verbal subscore is 5. GCS motor subscore is 6.   Reflex Scores:       Tricep reflexes are 2+ on the right side and 2+ on the left side.       Bicep reflexes are 2+ on the right side and 2+ on the left side.       Brachioradialis reflexes are 2+ on the right side and 2+ on the left side.       Patellar reflexes are 2+ on the right side and 2+ on the left side.       Achilles reflexes are 2+ on the right side and 2+ on the left side.  Skin: Skin is warm and dry.   Psychiatric: He has a normal mood and affect. His speech is normal and behavior is normal. Cognition and memory are normal.   Nursing note and vitals reviewed.      Results for orders placed or performed in visit on 08/18/17   Comprehensive Metabolic Panel   Result Value Ref Range    Glucose 99 70 - 100 mg/dL    BUN 12 5 - 21 mg/dL    Creatinine 1.12 0.50 - 1.40 mg/dL    eGFR Non African Am 69 >60 mL/min/1.73    eGFR African Am 84 >60 mL/min/1.73    BUN/Creatinine Ratio 10.7 7.0 - 25.0    Sodium 144 135 - 145 mmol/L    Potassium 3.8 3.5 - 5.3 mmol/L    Chloride 108 98 - 110 mmol/L    Total CO2 18.0 (L) 24.0 - 31.0 mmol/L    Calcium 9.1 8.4 - 10.4 mg/dL    Total Protein 7.3 6.3 - 8.7 g/dL    Albumin 4.20 3.50 - 5.00 g/dL    Globulin 3.1 gm/dL    A/G Ratio 1.4 1.1  - 2.5 g/dL    Total Bilirubin 0.5 0.1 - 1.0 mg/dL    Alkaline Phosphatase 110 24 - 120 U/L    AST (SGOT) 18 7 - 45 U/L    ALT (SGPT) 30 0 - 54 U/L   Magnesium   Result Value Ref Range    Magnesium 1.8 1.4 - 2.2 mg/dL   CBC & Differential   Result Value Ref Range    WBC 7.24 4.80 - 10.80 10*3/mm3    RBC 4.43 (L) 4.80 - 5.90 10*6/mm3    Hemoglobin 12.7 (L) 14.0 - 18.0 g/dL    Hematocrit 38.7 (L) 40.0 - 52.0 %    MCV 87.4 82.0 - 95.0 fL    MCH 28.7 28.0 - 32.0 pg    MCHC 32.8 (L) 33.0 - 36.0 g/dL    RDW 16.1 (H) 12.0 - 15.0 %    Platelets 320 130 - 400 10*3/mm3    Neutrophil Rel % 57.9 39.0 - 78.0 %    Lymphocyte Rel % 29.4 15.0 - 45.0 %    Monocyte Rel % 10.6 4.0 - 12.0 %    Eosinophil Rel % 1.4 0.0 - 4.0 %    Basophil Rel % 0.3 0.0 - 2.0 %    Neutrophils Absolute 4.19 1.87 - 8.40 10*3/mm3    Lymphocytes Absolute 2.13 0.72 - 4.86 10*3/mm3    Monocytes Absolute 0.77 0.19 - 1.30 10*3/mm3    Eosinophils Absolute 0.10 0.00 - 0.70 10*3/mm3    Basophils Absolute 0.02 0.00 - 0.20 10*3/mm3    Immature Granulocyte Rel % 0.4 0.0 - 5.0 %    Immature Grans Absolute 0.03 0.00 - 0.03 10*3/mm3      MRI BRAIN:. Interval resolution of posterior cortical and subcortical FLAIR  signal.  2. No stroke, hemorrhage, or abnormal enhancement.  3. No new or increased abnormality.  This report was finalized on [id number] by Dr. Camilo Almodovar MD.      EEG: Sleep Architecture:  No sleep architecture seen  Photic Stimulation:  Photic driving is prominent near the baseline background activity  Hyperventilation:  No EEG changes     Asymetries:  None  Epileptiform Activities:  None     Findings: Normal awake EEG     Harpreet Arroyo MD         ASSESSMENT/PLAN    Diagnoses and all orders for this visit:    Hyperlipidemia, unspecified hyperlipidemia type    Hypertension, unspecified type    Seizures    Other orders  -     Cancel: raNITIdine (ZANTAC) 75 MG tablet; Take 1 tablet by mouth 2 (Two) Times a Day.    Seizure in the setting of PRES:   1.  Remain off AED.  2. Patient not to drive until October 30 as he did stop Keppra on 9/17/17. He is to report any spells concerning to be seiuzre like.  3. Counseled on behaviors/events that could lower seizure threshold.  4. Counseled on ETOH use  5. Patient denies tobacco.  6. Seizure precautions were discussed to include no tub baths, no swimming, avoiding lack of sleep, and avoiding known triggers. Education given of things that may contribute to a seizure to include, but not limited to: stressful situations, fever, fatigue, lack of sleep, low blood sugar, hyperventilation, flashing lights, and caffeine. Instructions given to take seizure medications as prescribed. Education given to family member on what to do during a seizure and care following the seizure. Education given to contact this office prior to stopping or changing any medications.  7. Elevated BMI -       Patient given printed education with AVS for diet/exerise with AVS and encouraged to include 30 minutes of exercise 3-4 times weekly.  8. BP managed by PCP, he is to monitor and if not improved he is to notify his PCP.       allergies and all known medications/prescriptions have been reviewed using resources available on this encounter.    Return in about 4 months (around 2/12/2018).        EPI Silveira

## 2017-10-12 NOTE — PATIENT INSTRUCTIONS
Epilepsy  Epilepsy is a disorder in which a person has repeated seizures over time. A seizure is a release of abnormal electrical activity in the brain. Seizures can cause a change in attention, behavior, or the ability to remain awake and alert (altered mental status). Seizures often involve uncontrollable shaking (convulsions).   Most people with epilepsy lead normal lives. However, people with epilepsy are at an increased risk of falls, accidents, and injuries. Therefore, it is important to begin treatment right away.  CAUSES   Epilepsy has many possible causes. Anything that disturbs the normal pattern of brain cell activity can lead to seizures. This may include:   · Head injury.  · Birth trauma.  · High fever as a child.  · Stroke.  · Bleeding into or around the brain.  · Certain drugs.  · Prolonged low oxygen, such as what occurs after CPR efforts.  · Abnormal brain development.  · Certain illnesses, such as meningitis, encephalitis (brain infection), malaria, and other infections.  · An imbalance of nerve signaling chemicals (neurotransmitters).    SIGNS AND SYMPTOMS   The symptoms of a seizure can vary greatly from one person to another. Right before a seizure, you may have a warning (aura) that a seizure is about to occur. An aura may include the following symptoms:  · Fear or anxiety.  · Nausea.  · Feeling like the room is spinning (vertigo).  · Vision changes, such as seeing flashing lights or spots.  Common symptoms during a seizure include:  · Abnormal sensations, such as an abnormal smell or a bitter taste in the mouth.    · Sudden, general body stiffness.    · Convulsions that involve rhythmic jerking of the face, arm, or leg on one or both sides.    · Sudden change in consciousness.      Appearing to be awake but not responding.      Appearing to be asleep but cannot be awakened.    · Grimacing, chewing, lip smacking, drooling, tongue biting, or loss of bowel or bladder control.  After a seizure,  you may feel sleepy for a while.   DIAGNOSIS   Your health care provider will ask about your symptoms and take a medical history. Descriptions from any witnesses to your seizures will be very helpful in the diagnosis. A physical exam, including a detailed neurological exam, is necessary. Various tests may be done, such as:   · An electroencephalogram (EEG). This is a painless test of your brain waves. In this test, a diagram is created of your brain waves. These diagrams can be interpreted by a specialist.  · An MRI of the brain.    · A CT scan of the brain.    · A spinal tap (lumbar puncture, LP).  · Blood tests to check for signs of infection or abnormal blood chemistry.  TREATMENT   There is no cure for epilepsy, but it is generally treatable. Once epilepsy is diagnosed, it is important to begin treatment as soon as possible. For most people with epilepsy, seizures can be controlled with medicines. The following may also be used:  · A pacemaker for the brain (vagus nerve stimulator) can be used for people with seizures that are not well controlled by medicine.  · Surgery on the brain.  For some people, epilepsy eventually goes away.  HOME CARE INSTRUCTIONS   ·  Follow your health care provider's recommendations on driving and safety in normal activities.  · Get enough rest. Lack of sleep can cause seizures.  · Only take over-the-counter or prescription medicines as directed by your health care provider. Take any prescribed medicine exactly as directed.  · Avoid any known triggers of your seizures.  · Keep a seizure diary. Record what you recall about any seizure, especially any possible trigger.    · Make sure the people you live and work with know that you are prone to seizures. They should receive instructions on how to help you. In general, a witness to a seizure should:      Cushion your head and body.      Turn you on your side.      Avoid unnecessarily restraining you.      Not place anything inside your  mouth.      Call for emergency medical help if there is any question about what has occurred.    · Follow up with your health care provider as directed. You may need regular blood tests to monitor the levels of your medicine.    SEEK MEDICAL CARE IF:   · You develop signs of infection or other illness. This might increase the risk of a seizure.    · You seem to be having more frequent seizures.    · Your seizure pattern is changing.    SEEK IMMEDIATE MEDICAL CARE IF:   · You have a seizure that does not stop after a few moments.    · You have a seizure that causes any difficulty in breathing.    · You have a seizure that results in a very severe headache.    · You have a seizure that leaves you with the inability to speak or use a part of your body.       This information is not intended to replace advice given to you by your health care provider. Make sure you discuss any questions you have with your health care provider.     Document Released: 12/18/2006 Document Revised: 04/10/2017 Document Reviewed: 07/30/2014  [employer] Patient Education ©2017 [employer]  BMI for Adults  Body mass index (BMI) is a number that is calculated from a person's weight and height. In most adults, the number is used to find how much of an adult's weight is made up of fat. BMI is not as accurate as a direct measure of body fat.  HOW IS BMI CALCULATED?  BMI is calculated by dividing weight in kilograms by height in meters squared. It can also be calculated by dividing weight in pounds by height in inches squared, then multiplying the resulting number by 703. Charts are available to help you find your BMI quickly and easily without doing this calculation.   HOW IS BMI INTERPRETED?  Health care professionals use BMI charts to identify whether an adult is underweight, at a normal weight, or overweight based on the following guidelines:  · Underweight: BMI less than 18.5.  · Normal weight: BMI between 18.5 and  24.9.  · Overweight: BMI between 25 and 29.9.  · Obese: BMI of 30 and above.  BMI is usually interpreted the same for males and females.  Weight includes both fat and muscle, so someone with a muscular build, such as an athlete, may have a BMI that is higher than 24.9. In cases like these, BMI may not accurately depict body fat. To determine if excess body fat is the cause of a BMI of 25 or higher, further assessments may need to be done by a health care provider.  WHY IS BMI A USEFUL TOOL?  BMI is used to identify a possible weight problem that may be related to a medical problem or may increase the risk for medical problems. BMI can also be used to promote changes to reach a healthy weight.     This information is not intended to replace advice given to you by your health care provider. Make sure you discuss any questions you have with your health care provider.     Document Released: 08/29/2005 Document Revised: 01/08/2016 Document Reviewed: 05/15/2015  [employer] Interactive Patient Education ©2017 [employer]

## 2017-10-13 DIAGNOSIS — I10 ESSENTIAL HYPERTENSION: ICD-10-CM

## 2017-10-13 RX ORDER — AMLODIPINE BESYLATE 10 MG/1
TABLET ORAL
Qty: 30 TABLET | Refills: 0 | OUTPATIENT
Start: 2017-10-13

## 2017-10-13 RX ORDER — CARVEDILOL 25 MG/1
TABLET ORAL
Qty: 60 TABLET | Refills: 0 | OUTPATIENT
Start: 2017-10-13

## 2017-10-13 RX ORDER — LEVETIRACETAM 500 MG/1
TABLET ORAL
Qty: 60 TABLET | Refills: 0 | OUTPATIENT
Start: 2017-10-13

## 2017-10-18 DIAGNOSIS — N17.9 AKI (ACUTE KIDNEY INJURY) (HCC): Primary | ICD-10-CM

## 2017-10-18 LAB
ALBUMIN SERPL-MCNC: 4.2 G/DL (ref 3.5–5)
ALBUMIN/GLOB SERPL: 1.2 G/DL (ref 1.1–2.5)
ALP SERPL-CCNC: 122 U/L (ref 24–120)
ALT SERPL-CCNC: 41 U/L (ref 0–54)
AST SERPL-CCNC: 25 U/L (ref 7–45)
BILIRUB SERPL-MCNC: 0.4 MG/DL (ref 0.1–1)
BUN SERPL-MCNC: 25 MG/DL (ref 5–21)
BUN/CREAT SERPL: 15.4 (ref 7–25)
CALCIUM SERPL-MCNC: 10 MG/DL (ref 8.4–10.4)
CHLORIDE SERPL-SCNC: 101 MMOL/L (ref 98–110)
CO2 SERPL-SCNC: 30 MMOL/L (ref 24–31)
CREAT SERPL-MCNC: 1.62 MG/DL (ref 0.5–1.4)
GLOBULIN SER CALC-MCNC: 3.5 GM/DL
GLUCOSE SERPL-MCNC: 96 MG/DL (ref 70–100)
MAGNESIUM SERPL-MCNC: 1.8 MG/DL (ref 1.4–2.2)
POTASSIUM SERPL-SCNC: 4.9 MMOL/L (ref 3.5–5.3)
PROT SERPL-MCNC: 7.7 G/DL (ref 6.3–8.7)
SODIUM SERPL-SCNC: 143 MMOL/L (ref 135–145)

## 2017-10-19 ENCOUNTER — TELEPHONE (OUTPATIENT)
Dept: FAMILY MEDICINE CLINIC | Facility: CLINIC | Age: 52
End: 2017-10-19

## 2017-10-23 ENCOUNTER — OFFICE VISIT (OUTPATIENT)
Dept: FAMILY MEDICINE CLINIC | Facility: CLINIC | Age: 52
End: 2017-10-23

## 2017-10-23 VITALS
TEMPERATURE: 98.3 F | OXYGEN SATURATION: 98 % | DIASTOLIC BLOOD PRESSURE: 60 MMHG | WEIGHT: 200.9 LBS | HEIGHT: 68 IN | BODY MASS INDEX: 30.45 KG/M2 | SYSTOLIC BLOOD PRESSURE: 110 MMHG | HEART RATE: 100 BPM

## 2017-10-23 DIAGNOSIS — G89.29 CHRONIC PAIN OF BOTH KNEES: Primary | ICD-10-CM

## 2017-10-23 DIAGNOSIS — M25.561 CHRONIC PAIN OF BOTH KNEES: Primary | ICD-10-CM

## 2017-10-23 DIAGNOSIS — M25.562 CHRONIC PAIN OF BOTH KNEES: Primary | ICD-10-CM

## 2017-10-23 PROCEDURE — 99213 OFFICE O/P EST LOW 20 MIN: CPT | Performed by: NURSE PRACTITIONER

## 2017-10-23 NOTE — PROGRESS NOTES
Chief Complaint   Patient presents with   • bilateral knee pain     D/c'ed all steroids thats when the pain started     Allergies   Allergen Reactions   • Lipitor [Atorvastatin] Rash   • Lisinopril Angioedema     HPI: Lukasz Savage is a 52 y.o. male presents today with complaints of severe pain, difficulty bending knees, difficulty walking.  Says since Dr. Almeida took him off all the NSAIDS he can not function with daily activities.  He started having swelling and pain, and numbness and tingling in bilateral legs since middle of July, and Gout.  Since that time the swelling is greatly improved but since he has been off the NSAIDS he says pain is unabearable.      He quit 07/2017.  Seizure x 1.       Lukasz Savage  has a past medical history of Angio-edema (7/3/2017); Anxiety; Arthritis; Clostridium difficile colitis; Erectile dysfunction (10/6/2016); Gout; HCAP (healthcare-associated pneumonia) (7/11/2017); Hyperlipidemia; Malignant hypertension; MSSA (methicillin susceptible Staphylococcus aureus) infection (7/31/2017); Obstructive sleep apnea; and Seizures (7/4/2017).    PCP currently listed as Linda Hoffman, DNP, APRN.     Past Medical History:   Diagnosis Date   • Angio-edema 7/3/2017   • Anxiety    • Arthritis    • Clostridium difficile colitis    • Erectile dysfunction 10/6/2016   • Gout    • HCAP (healthcare-associated pneumonia) 7/11/2017   • Hyperlipidemia    • Malignant hypertension    • MSSA (methicillin susceptible Staphylococcus aureus) infection 7/31/2017   • Obstructive sleep apnea    • Seizures 7/4/2017     Past Surgical History:   Procedure Laterality Date   • TRACHEOSTOMY N/A 7/12/2017    Procedure: TRACHEOSTOMY WITH TRACHEOSCOPY;  Surgeon: Jairon Pierson MD;  Location: [facility];  Service:      Social History     Social History   • Marital status: Single     Spouse name: N/A   • Number of children: N/A   • Years of education: N/A     Social History Main Topics   • Smoking status:  Former Smoker     Packs/day: 0.33     Years: 30.00     Types: Cigarettes     Quit date: 7/3/2017   • Smokeless tobacco: Never Used   • Alcohol use No      Comment: PER DAY FOR 30 YEARS; Last drink July 3, 2017   • Drug use: No   • Sexual activity: Defer     Other Topics Concern   • None     Social History Narrative     Family History   Problem Relation Age of Onset   • Hypertension Mother    • Diabetes Mother    • Heart disease Father    • Hypertension Father    • No Known Problems Daughter    • No Known Problems Son    • Diabetes Maternal Grandmother    • No Known Problems Maternal Grandfather    • No Known Problems Paternal Grandmother    • Heart attack Paternal Grandfather    • Kidney disease Sister        Current Outpatient Prescriptions on File Prior to Visit   Medication Sig Dispense Refill   • carvedilol (COREG) 25 MG tablet Take 1 tablet by mouth Every 12 (Twelve) Hours. 60 tablet 1   • furosemide (LASIX) 20 MG tablet Take 1 tablet by mouth Daily. 30 tablet 1   • losartan (COZAAR) 100 MG tablet Take 1 tablet by mouth Daily. 30 tablet 1   • omeprazole (PRILOSEC) 10 MG capsule Take 10 mg by mouth Daily.     • potassium chloride (K-DUR,KLOR-CON) 10 MEQ CR tablet Take 1 tablet by mouth Daily. 30 tablet 1   • raNITIdine (ZANTAC) 75 MG tablet Take 75 mg by mouth 2 (Two) Times a Day.     • spironolactone (ALDACTONE) 25 MG tablet Take 1 tablet by mouth Daily. 30 tablet 1   • vardenafil (LEVITRA) 10 MG tablet Take 1 tablet by mouth Daily As Needed for erectile dysfunction. 10 tablet 5   • CloNIDine (CATAPRES) 0.1 MG tablet Take 1 tablet by mouth Every Night. Check BP 4x daily. If> 180 SBP take x1. 60 tablet 2   • colchicine 0.6 MG tablet Take 2 pills now, 1 pill in one hr.  Then Can take daily 14 tablet 3   • ibuprofen (ADVIL,MOTRIN) 600 MG tablet Take 1 tablet by mouth Every 6 (Six) Hours As Needed for Mild Pain . 90 tablet 1     No current facility-administered medications on file prior to visit.       BOLD  "indicates positive   General:  weight loss, fever, chills, appetite loss, activity change  SKIN: change in wart/mole, itching, rash, new lesions, nail changes  HEENT:   ear pain, sore throat, sinus pressure, blurry vision, eye pain, dry eyes, tinnitus  Respiratory: cough, difficulty breathing, wheezing, hemoptysis   Cardiovascular:  chest pain, shortness of breath, swelling of extremities, syncope  Musckelo: joint pain, muscle cramps, arthralgia’s, muscle weakness, joint swelling, NSAID use  Neuro:  headache, tremors, numbness, memory loss, irritability, dizziness  Psychiatric: anxiety, depression, insomnia, change in sleep pattern, mood changes  \"All other systems reviewed and negative, except as listed above.”    OBJECTIVE:  Constitutional:  Appearance-No acute distress, but looks like he is in a lot of pain. Orientation- Oriented x 3, alert Posture- leaning over cane. Gait-Abnormal pace, he is bend over cane and walking with cane between his legs and he swings his geet outto the side.   Grunts and moans with every movement. General- Patient is pleasant and cooperative with the interview and exam.    Integumentary: General-No rashes, ulcers or lesions. No edema.  Palpation- Normal skin moisture/turgor. Skin is warm to touch, no increased warmth. Capillary refill is normal bilateral Upper and lower extremity.     CHEST/LUNG: Inspection- symmetric chest wall no pectus deformity. Normal effort, no distress, no use of accessory muscles. Palpation- nontender sternum, ribline.  No abnormal pulsations. Auscultation- Breath sounds normal throughout all lung fields.  Normal tracheal sounds, Normal bronchial sounds overlying sternum, Bronchovessicular sounds normal between scapulae posteriorly, Normal vessicular breath sounds heard throughout periphery. Lungs are clear today. Adventitious sounds- No wheezes, rales, rhonchi.     CARDIOVASCULAR: arotid artery- normal, no bruits or abnormal pulsations. Jugular vein- no " pulsations. Palpation/Percussion- Normal PMI, no palpable thrill  Auscultation- Regular rate and rhythm. No murmur noted in sitting, supine positions. Extremities- no digital clubbing, cyanosis, edema, increased warmth.    ABDOMEN: Inspection- normal and no visible pulsations. Normal contour. Auscultation- Bowel sounds normal, no abdominal bruits. Palpation/Percussion- soft, non-tender, no rebound tenderness, no rigidity (guarding), no jar tenderness, no masses.  Liver-no hepatomegaly, Spleen - no splenomegaly, Hernias- none. Rectal not examined.     Musculoskeletal:   hands/wrists/elbows.  Bilateral Lower extremity- Gait abnormal, has tenderness that he yells that hurts and moves on table to keep me from pushing anywhere else.   He has tenderness of the whole lower extremity.   Has pain anywhere I touch on the knees, below knees, and behinds knees. He grunts in pain just getting on the table.  Theres pain any where you touch him.  He grunts in pain trying to lay down.  Unable to bend knees or do straight raises.  He complains with pain with any movement  .  Spine/Ribs- No deformities, masses or tenderness, no known fractures, normal strength, Normal ROM. Normal stability No tenderness along C/T/L spine.  Normal appearing ROM about spine.          Assessment/Plan:  Lukasz was seen today for bilateral knee pain.    Diagnoses and all orders for this visit:    Chronic pain of both knees  Due to the severity of the pain, and difficulty he has sitting, laying and even walking.  He was given options of going to the ER or going to Orthopedic urgent care as I told him I didn't have anything in the office for pain and that he did not need to be put back on NSAIDS due to kidney function. Pt agreed to go on to the ER>

## 2017-10-25 PROBLEM — R60.9 PERIPHERAL EDEMA: Status: ACTIVE | Noted: 2017-10-25

## 2017-11-01 ENCOUNTER — RESULTS ENCOUNTER (OUTPATIENT)
Dept: FAMILY MEDICINE CLINIC | Facility: CLINIC | Age: 52
End: 2017-11-01

## 2017-11-01 DIAGNOSIS — N17.9 AKI (ACUTE KIDNEY INJURY) (HCC): ICD-10-CM

## 2017-11-04 DIAGNOSIS — R60.0 LOCALIZED EDEMA: ICD-10-CM

## 2017-11-04 DIAGNOSIS — E87.6 HYPOKALEMIA: Primary | ICD-10-CM

## 2017-11-04 RX ORDER — FUROSEMIDE 20 MG/1
TABLET ORAL
Qty: 30 TABLET | Refills: 0 | Status: CANCELLED | OUTPATIENT
Start: 2017-11-04

## 2017-11-06 DIAGNOSIS — R60.0 LOCALIZED EDEMA: ICD-10-CM

## 2017-11-06 RX ORDER — FUROSEMIDE 20 MG/1
20 TABLET ORAL DAILY
Qty: 30 TABLET | Refills: 0 | Status: SHIPPED | OUTPATIENT
Start: 2017-11-06 | End: 2017-11-20 | Stop reason: SDUPTHER

## 2017-11-06 RX ORDER — POTASSIUM CHLORIDE 750 MG/1
10 TABLET, EXTENDED RELEASE ORAL DAILY
Qty: 30 TABLET | Refills: 2 | Status: SHIPPED | OUTPATIENT
Start: 2017-11-06 | End: 2017-11-20 | Stop reason: SDUPTHER

## 2017-11-06 RX ORDER — POTASSIUM CHLORIDE 750 MG/1
TABLET, FILM COATED, EXTENDED RELEASE ORAL
Qty: 30 TABLET | Refills: 0 | Status: SHIPPED | OUTPATIENT
Start: 2017-11-06 | End: 2017-11-20 | Stop reason: SDUPTHER

## 2017-11-07 LAB
ALBUMIN SERPL-MCNC: 3.8 G/DL (ref 3.5–5)
ALBUMIN/GLOB SERPL: 1.2 G/DL (ref 1.1–2.5)
ALP SERPL-CCNC: 169 U/L (ref 24–120)
ALT SERPL-CCNC: 22 U/L (ref 0–54)
AST SERPL-CCNC: 19 U/L (ref 7–45)
BILIRUB SERPL-MCNC: 0.4 MG/DL (ref 0.1–1)
BUN SERPL-MCNC: 26 MG/DL (ref 5–21)
BUN/CREAT SERPL: 12.9 (ref 7–25)
CALCIUM SERPL-MCNC: 9.1 MG/DL (ref 8.4–10.4)
CHLORIDE SERPL-SCNC: 96 MMOL/L (ref 98–110)
CO2 SERPL-SCNC: 25 MMOL/L (ref 24–31)
CREAT SERPL-MCNC: 2.01 MG/DL (ref 0.5–1.4)
GFR SERPLBLD CREATININE-BSD FMLA CKD-EPI: 35 ML/MIN/1.73
GFR SERPLBLD CREATININE-BSD FMLA CKD-EPI: 43 ML/MIN/1.73
GLOBULIN SER CALC-MCNC: 3.3 GM/DL
GLUCOSE SERPL-MCNC: 87 MG/DL (ref 70–100)
POTASSIUM SERPL-SCNC: 4.5 MMOL/L (ref 3.5–5.3)
PROT SERPL-MCNC: 7.1 G/DL (ref 6.3–8.7)
SODIUM SERPL-SCNC: 137 MMOL/L (ref 135–145)

## 2017-11-08 DIAGNOSIS — N17.9 AKI (ACUTE KIDNEY INJURY) (HCC): Primary | ICD-10-CM

## 2017-11-08 DIAGNOSIS — I10 ESSENTIAL HYPERTENSION: ICD-10-CM

## 2017-11-08 DIAGNOSIS — I15.9 SECONDARY HYPERTENSION: ICD-10-CM

## 2017-11-08 RX ORDER — LOSARTAN POTASSIUM 100 MG/1
100 TABLET ORAL
Qty: 30 TABLET | Refills: 0 | Status: SHIPPED | OUTPATIENT
Start: 2017-11-08 | End: 2017-11-20 | Stop reason: SDUPTHER

## 2017-11-08 RX ORDER — CARVEDILOL 25 MG/1
25 TABLET ORAL EVERY 12 HOURS SCHEDULED
Qty: 60 TABLET | Refills: 0 | Status: SHIPPED | OUTPATIENT
Start: 2017-11-08 | End: 2017-11-20 | Stop reason: SDUPTHER

## 2017-11-10 DIAGNOSIS — I15.9 SECONDARY HYPERTENSION: ICD-10-CM

## 2017-11-10 DIAGNOSIS — I10 ESSENTIAL HYPERTENSION: ICD-10-CM

## 2017-11-11 RX ORDER — CARVEDILOL 25 MG/1
TABLET ORAL
Qty: 60 TABLET | Refills: 0 | OUTPATIENT
Start: 2017-11-11

## 2017-11-11 RX ORDER — LOSARTAN POTASSIUM 100 MG/1
TABLET ORAL
Qty: 30 TABLET | Refills: 0 | OUTPATIENT
Start: 2017-11-11

## 2017-11-11 RX ORDER — SPIRONOLACTONE 25 MG/1
TABLET ORAL
Qty: 30 TABLET | Refills: 0 | OUTPATIENT
Start: 2017-11-11

## 2017-11-15 ENCOUNTER — RESULTS ENCOUNTER (OUTPATIENT)
Dept: FAMILY MEDICINE CLINIC | Facility: CLINIC | Age: 52
End: 2017-11-15

## 2017-11-15 DIAGNOSIS — N17.9 AKI (ACUTE KIDNEY INJURY) (HCC): ICD-10-CM

## 2017-11-20 ENCOUNTER — OFFICE VISIT (OUTPATIENT)
Dept: FAMILY MEDICINE CLINIC | Facility: CLINIC | Age: 52
End: 2017-11-20

## 2017-11-20 VITALS
TEMPERATURE: 97.9 F | HEART RATE: 90 BPM | DIASTOLIC BLOOD PRESSURE: 88 MMHG | SYSTOLIC BLOOD PRESSURE: 124 MMHG | OXYGEN SATURATION: 96 % | WEIGHT: 200.6 LBS | BODY MASS INDEX: 30.4 KG/M2 | HEIGHT: 68 IN

## 2017-11-20 DIAGNOSIS — K21.9 GASTROESOPHAGEAL REFLUX DISEASE WITHOUT ESOPHAGITIS: ICD-10-CM

## 2017-11-20 DIAGNOSIS — R60.0 LOCALIZED EDEMA: ICD-10-CM

## 2017-11-20 DIAGNOSIS — M25.50 ARTHRALGIA, UNSPECIFIED JOINT: ICD-10-CM

## 2017-11-20 DIAGNOSIS — N52.9 ERECTILE DYSFUNCTION, UNSPECIFIED ERECTILE DYSFUNCTION TYPE: ICD-10-CM

## 2017-11-20 DIAGNOSIS — I15.9 SECONDARY HYPERTENSION: ICD-10-CM

## 2017-11-20 DIAGNOSIS — Z23 FLU VACCINE NEED: Primary | ICD-10-CM

## 2017-11-20 DIAGNOSIS — I10 HYPERTENSION, UNSPECIFIED TYPE: ICD-10-CM

## 2017-11-20 DIAGNOSIS — I10 ESSENTIAL HYPERTENSION: ICD-10-CM

## 2017-11-20 PROCEDURE — 90686 IIV4 VACC NO PRSV 0.5 ML IM: CPT | Performed by: NURSE PRACTITIONER

## 2017-11-20 PROCEDURE — 90471 IMMUNIZATION ADMIN: CPT | Performed by: NURSE PRACTITIONER

## 2017-11-20 PROCEDURE — 99214 OFFICE O/P EST MOD 30 MIN: CPT | Performed by: NURSE PRACTITIONER

## 2017-11-20 RX ORDER — SPIRONOLACTONE 25 MG/1
25 TABLET ORAL DAILY
Qty: 90 TABLET | Refills: 1 | Status: SHIPPED | OUTPATIENT
Start: 2017-11-20 | End: 2018-02-12 | Stop reason: SDUPTHER

## 2017-11-20 RX ORDER — POTASSIUM CHLORIDE 750 MG/1
10 TABLET, EXTENDED RELEASE ORAL DAILY
Qty: 90 TABLET | Refills: 1 | Status: SHIPPED | OUTPATIENT
Start: 2017-11-20 | End: 2018-02-14 | Stop reason: ALTCHOICE

## 2017-11-20 RX ORDER — OMEPRAZOLE 10 MG/1
10 CAPSULE, DELAYED RELEASE ORAL DAILY
Qty: 90 CAPSULE | Refills: 1 | Status: SHIPPED | OUTPATIENT
Start: 2017-11-20 | End: 2018-05-16 | Stop reason: SDUPTHER

## 2017-11-20 RX ORDER — CARVEDILOL 25 MG/1
25 TABLET ORAL EVERY 12 HOURS SCHEDULED
Qty: 180 TABLET | Refills: 1 | Status: SHIPPED | OUTPATIENT
Start: 2017-11-20 | End: 2018-05-08 | Stop reason: SDUPTHER

## 2017-11-20 RX ORDER — FUROSEMIDE 20 MG/1
20 TABLET ORAL DAILY
Qty: 90 TABLET | Refills: 1 | Status: SHIPPED | OUTPATIENT
Start: 2017-11-20 | End: 2018-04-09

## 2017-11-20 RX ORDER — LOSARTAN POTASSIUM 100 MG/1
100 TABLET ORAL
Qty: 90 TABLET | Refills: 1 | Status: SHIPPED | OUTPATIENT
Start: 2017-11-20 | End: 2018-05-16 | Stop reason: SDUPTHER

## 2017-11-20 NOTE — PATIENT INSTRUCTIONS

## 2017-11-20 NOTE — PROGRESS NOTES
Chief Complaint   Patient presents with   • Hypertension   • Hyperlipidemia   • Gout   • Anxiety      Allergies   Allergen Reactions   • Lipitor [Atorvastatin] Rash   • Lisinopril Angioedema     HPI: Lukasz Savage is a 52 y.o. male presents today with for chronic visit, with med refills and labs.  He is greatly improved from the last visit (he was walking with a cane) he is walking without cane, he is feeling very good.  Says he feels better then he has felt in months.     Past Medical History:   Diagnosis Date   • Angio-edema 7/3/2017   • Anxiety    • Arthritis    • Clostridium difficile colitis    • Erectile dysfunction 10/6/2016   • Gout    • HCAP (healthcare-associated pneumonia) 7/11/2017   • Hyperlipidemia    • Malignant hypertension    • MSSA (methicillin susceptible Staphylococcus aureus) infection 7/31/2017   • Obstructive sleep apnea    • Seizures 7/4/2017     Past Surgical History:   Procedure Laterality Date   • TRACHEOSTOMY N/A 7/12/2017    Procedure: TRACHEOSTOMY WITH TRACHEOSCOPY;  Surgeon: Jairon Pierson MD;  Location: [facility];  Service:      Social History     Social History   • Marital status: Single     Spouse name: N/A   • Number of children: N/A   • Years of education: N/A     Social History Main Topics   • Smoking status: Former Smoker     Packs/day: 0.33     Years: 30.00     Types: Cigarettes     Quit date: 7/3/2017   • Smokeless tobacco: Never Used   • Alcohol use No      Comment: PER DAY FOR 30 YEARS; Last drink July 3, 2017   • Drug use: No   • Sexual activity: Defer     Other Topics Concern   • None     Social History Narrative     Family History   Problem Relation Age of Onset   • Hypertension Mother    • Diabetes Mother    • Heart disease Father    • Hypertension Father    • No Known Problems Daughter    • No Known Problems Son    • Diabetes Maternal Grandmother    • No Known Problems Maternal Grandfather    • No Known Problems Paternal Grandmother    • Heart attack Paternal  Grandfather    • Kidney disease Sister        Current Outpatient Prescriptions on File Prior to Visit   Medication Sig Dispense Refill   • carvedilol (COREG) 25 MG tablet Take 1 tablet by mouth Every 12 (Twelve) Hours. 60 tablet 0   • CloNIDine (CATAPRES) 0.1 MG tablet Take 1 tablet by mouth Every Night. Check BP 4x daily. If> 180 SBP take x1. 60 tablet 2   • furosemide (LASIX) 20 MG tablet Take 1 tablet by mouth Daily. 30 tablet 0   • losartan (COZAAR) 100 MG tablet Take 1 tablet by mouth Daily. 30 tablet 0   • potassium chloride (K-DUR,KLOR-CON) 10 MEQ CR tablet Take 1 tablet by mouth Daily. 30 tablet 2   • spironolactone (ALDACTONE) 25 MG tablet Take 1 tablet by mouth Daily. 30 tablet 1   • vardenafil (LEVITRA) 10 MG tablet Take 1 tablet by mouth Daily As Needed for erectile dysfunction. 10 tablet 5   • [DISCONTINUED] potassium chloride (K-DUR) 10 MEQ CR tablet TAKE ONE TABLET BY MOUTH DAILY 30 tablet 0   • colchicine 0.6 MG tablet Take 2 pills now, 1 pill in one hr.  Then Can take daily 14 tablet 3   • ibuprofen (ADVIL,MOTRIN) 600 MG tablet Take 1 tablet by mouth Every 6 (Six) Hours As Needed for Mild Pain . 90 tablet 1   • omeprazole (PRILOSEC) 10 MG capsule Take 10 mg by mouth Daily. As needed.     • raNITIdine (ZANTAC) 75 MG tablet Take 75 mg by mouth 2 (Two) Times a Day.       No current facility-administered medications on file prior to visit.         REVIEW OF SYMPTOMS: (Positives bolded)  General:  weight loss, fever, chills, night sweats, fatigue, appetite loss  Respiratory: shortness of breath, cough, hemoptysis, wheezing, pleurisy,   Cardiovascular:  chest pain, PND, palpitation, edema, orthopnea, syncope, swelling of extremities  Gastro: Nausea, vomiting, diarrhea, hematemesis, abdominal pain, constipation  Genito: hematuria, dysuria, glycosuria, hesitancy, frequency, incontinence  Musckelo: Arthralgia, myalgia, muscle weakness, joint swelling, NSAID use  Skin: rash, pruritis, sores, nail changes,  "skin thickening, change in wart/mole, itching, rash, new lesions, pruritus, nail changes  Neuro:  Migraine, numbness, ataxia, tremor, vertigo, weakness, memory loss,  \"All other systems reviewed and negative, except as listed above.”      OBJECTIVE:  Constitutional:  Appearance-No acute distress, Consistent with stated age. Orientation- Oriented x 3, alert Posture-Not doubled over. Gait-Normal pace, normal arm movement. Posture- Normal Build and Nutrition-Well developed and well nourished.  General- Patient is pleasant and cooperative with the interview and exam.    Integumentary: General-No rashes, ulcers or lesions. No edema.  Palpation- Normal skin moisture/turgor. Skin is warm to touch, no increased warmth. Capillary refill is normal bilateral Upper and lower extremity.     Head/Neck: Head- normocephalic and atraumatic.  Neck- without visible/palpable lumps or pulsations.  Palpation- No bony tenderness about head/neck along frontal, occiptial, temporal, parietal, mastoid, jawline, zygoma, orbit or any other location.  NO temporal artery tenderness. No TMJ tenderness. Normal cervical ROM.   Neck Supple.  Thyroid-No thyromegaly, no nodules    Eye: Bilaterally PERRLA, EOMI.  No discharge.  Upper and lower eyelids are normal. Sclera/conjunctiva normal without discharge. Cornea is normal and clear. Lens is normal.  Eyeball appears normal. No ciliary flushing, no conjunctival injection.    ENMT:  Pinna- normal without tenderness or erythema.  External auditory canal Left- normal without erythema or discharge, no excessive cerumen. External auditory canal Right-normal without erythema or discharge, no excessive cerumen. TM left- Grey/pearly, normal light reflex and anatomy TM Right- Grey/pearly, normal light reflex and anatomy Hearing Assessment-normal to conversational speech at 2-5 feet.  Nose and sinus-No sinus tenderness along frontal/maxillary region. External appearance normal and midline. Nares- bilateral quiet " airflow, no discharge. Nasal mucosa- No bleeding noted and no ulcerations observed. Pink, moist. Turbinates non boggy. Lips- normal color, moist without cracks/lesions Oral Cavity/Palate- hard/soft palate intact without lesions, oral mucosa pink and moist. Dentition assessed and discussed appropriate oral care. Tongue normal midline.  Oropharynx- no pharyngeal erythema, Uvula midline. No post nasal drip. No exudate. Salivary glands- Non tender to palpation    CHEST/LUNG: Inspection- symmetric chest wall no pectus deformity. Normal effort, no distress, no use of accessory muscles. Palpation- nontender sternum, ribline.  No abnormal pulsations. Auscultation- Breath sounds normal throughout all lung fields.  Normal tracheal sounds, Normal bronchial sounds overlying sternum, Bronchovessicular sounds normal between scapulae posteriorly, Normal vessicular breath sounds heard throughout periphery. Lungs are clear today. Adventitious sounds- No wheezes, rales, rhonchi.     CARDIOVASCULAR:  Carotid artery- normal, no bruits or abnormal pulsations. Jugular vein- no pulsations. Palpation/Percussion- Normal PMI, no palpable thrill  Auscultation- Regular rate and rhythm. No murmur noted in sitting, supine positions. Extremities- no digital clubbing, cyanosis, edema, increased warmth.    Peripheral Vascular: Upper extremity Left- Normal temperature with pink nailbeds and no ulcerations.  Upper extremity Right- Normal temperature with pink nailbeds and no ulcerations.  Lower extremity- Normal temperature with pink nailbeds and no ulcerations. DP pulses 2+ bilaterally.  Pedal hair intact.  Normal capillary refill. Edema- No edema.    Musculoskeletal: Generalized-No generalized swelling or edema of extremities, no digital clubbing or cyanosis, neurovascularly intact all four extremities.  Upper extremity- Symmetrical posture.  No visible deformity.  Normal sensation along medial and lateral upper extremity proximally and distally.   NO tenderness overlying shoulder, lateral/medial epicondyle.  5/5 and strength 5/5 bilateral UE.  Elbow palpated, no tenderness overlying olecranon.  Normal supination, pronation to active/passive ROM and to resisted rotation. Bicep insertion/tricep insertion appear normal without obvious pathology. Rotator cuff evaluated and intact.  Normal wrist ROM bilaterally. Normal hand movement, intrinsic muscles of hands normal. No tenderness to palpation of hands/wrists/elbows.  Lower extremity- Chronic pain of lower extremities, tenderness on palpation of the lower extremities, and bilateral feet.  Has 2+ edema, tender to palpation, no erythema of surrounding tissue, decreased strength and tone.  Decreased ROM.  Decreased ROM of Knee.  No tenderness at tibial tuberosity. Decreased ROM of ankle.   Spine/Ribs- No deformities, masses or tenderness, no known fractures, normal strength, Normal ROM. Normal stability No tenderness along C/T/L spine.  Normal appearing ROM about spine.      Neuropsych: Oriented- Person, place, time. (AAOx3), Mood/affect- normal and congruent. Able to articulate well. Speech-Normal speech, normal rate, normal tone, normal use of language, volume and coherence.  Thought content- normal with ability to perform basic computations and apply abstract thought/reason. Associations- intact, no SI/HI, no hallucinations, delusions, obsessions.  Judgment/insight- Appropriate. Memory-Recall intact, remote and recent memory intact. Knowledge- Age appropriate fund of knowledge, concentration and attention span normal.    Lymphatic: Head/Neck- normal size and non tender to palpation. Axillary- Head and neck LN are normal size and non tender to palpation. Femoral and Inguinal- normal size and non tender to palpation.      Assessment/Plan:  Lukasz was seen today for hypertension, hyperlipidemia, gout and anxiety.    Diagnoses and all orders for this visit:    Essential hypertension  -     CBC & Differential  -      Comprehensive Metabolic Panel  -     Lipid Panel  -     carvedilol (COREG) 25 MG tablet; Take 1 tablet by mouth Every 12 (Twelve) Hours. For high blood pressure    Secondary hypertension  -     losartan (COZAAR) 100 MG tablet; Take 1 tablet by mouth Daily. For high blood pressure    Hypertension, unspecified type    Erectile dysfunction, unspecified erectile dysfunction type    Localized edema  -     furosemide (LASIX) 20 MG tablet; Take 1 tablet by mouth Daily. For edema in lower extremities  -     potassium chloride (K-DUR,KLOR-CON) 10 MEQ CR tablet; Take 1 tablet by mouth Daily. For low potassium    Gastroesophageal reflux disease without esophagitis  -     omeprazole (PRILOSEC) 10 MG capsule; Take 1 capsule by mouth Daily. For acid reflux    Arthralgia, unspecified joint  -     Ambulatory Referral to Physical Therapy Evaluate and treat    Obesity: Federal guidelines recommend that people under the age of 65 should have a BMI of 18.5-24.9 and people age 65 and older should have a BMI of 23-30. The patient BMI 30.5 is outside this range and we recommended/discussed today to utilize a diet/exercise program to get back into the appropriate range.  Today we encouraged roughly a 1 lb per week weight loss with initial goal of 5% weight loss. The initial step is to document everything that is consumed into a food diary.  Studies have shown that patients can lose up to 2x the weight by keeping track of foods.     a. Discussed if eating out for a meal, consider cutting food in half and placing into to-go container.  Individually portion any foods coming into the home based on package.    b. Consider using smaller plates.    c. Consider drinking 12 oz of water 30 minutes before meal as way to suppress appetite.   d. Cut back on soft drinks/juices.  Discussed 1 can of regular soft drink has roughly 150-170 Calories per day.    e. Increase exercise as able. It is recommended to try to exercise minimum 10 minutes 5 days  per week.  The goal over the next 2-4 weeks is to walk 30 minutes per day 5 days per week at pace difficult to hold conversation.   f. Would like to see back in roughly 3 months.      Return in about 6 months (around 5/20/2018), or if symptoms worsen or fail to improve.  .  Linda Hoffman, DNP, APRN-BC  11/20/2017      Addendum:  During office visit pt told me he was not taking spironlactone.  I told him to check at home with his bottles because he needs to be on it.  He called back and says he was taking it not taking norvasc.

## 2017-11-21 LAB
ALBUMIN SERPL-MCNC: 4.6 G/DL (ref 3.5–5)
ALBUMIN/GLOB SERPL: 1.3 G/DL (ref 1.1–2.5)
ALP SERPL-CCNC: 138 U/L (ref 24–120)
ALT SERPL-CCNC: 34 U/L (ref 0–54)
AST SERPL-CCNC: 26 U/L (ref 7–45)
BASOPHILS # BLD AUTO: 0.05 10*3/MM3 (ref 0–0.2)
BASOPHILS NFR BLD AUTO: 0.5 % (ref 0–2)
BILIRUB SERPL-MCNC: 0.5 MG/DL (ref 0.1–1)
BUN SERPL-MCNC: 22 MG/DL (ref 5–21)
BUN/CREAT SERPL: 14.1 (ref 7–25)
CALCIUM SERPL-MCNC: 10.3 MG/DL (ref 8.4–10.4)
CHLORIDE SERPL-SCNC: 97 MMOL/L (ref 98–110)
CHOLEST SERPL-MCNC: 273 MG/DL (ref 130–200)
CO2 SERPL-SCNC: 28 MMOL/L (ref 24–31)
CREAT SERPL-MCNC: 1.56 MG/DL (ref 0.5–1.4)
EOSINOPHIL # BLD AUTO: 0.44 10*3/MM3 (ref 0–0.7)
EOSINOPHIL NFR BLD AUTO: 4.3 % (ref 0–4)
ERYTHROCYTE [DISTWIDTH] IN BLOOD BY AUTOMATED COUNT: 17.9 % (ref 12–15)
GFR SERPLBLD CREATININE-BSD FMLA CKD-EPI: 47 ML/MIN/1.73
GFR SERPLBLD CREATININE-BSD FMLA CKD-EPI: 57 ML/MIN/1.73
GLOBULIN SER CALC-MCNC: 3.6 GM/DL
GLUCOSE SERPL-MCNC: 112 MG/DL (ref 70–100)
HCT VFR BLD AUTO: 40.1 % (ref 40–52)
HDLC SERPL-MCNC: 38 MG/DL
HGB BLD-MCNC: 12.7 G/DL (ref 14–18)
IMM GRANULOCYTES # BLD: 0.05 10*3/MM3 (ref 0–0.03)
IMM GRANULOCYTES NFR BLD: 0.5 % (ref 0–5)
LDLC SERPL CALC-MCNC: ABNORMAL MG/DL
LYMPHOCYTES # BLD AUTO: 2.73 10*3/MM3 (ref 0.72–4.86)
LYMPHOCYTES NFR BLD AUTO: 26.8 % (ref 15–45)
MCH RBC QN AUTO: 27.7 PG (ref 28–32)
MCHC RBC AUTO-ENTMCNC: 31.7 G/DL (ref 33–36)
MCV RBC AUTO: 87.4 FL (ref 82–95)
MONOCYTES # BLD AUTO: 0.71 10*3/MM3 (ref 0.19–1.3)
MONOCYTES NFR BLD AUTO: 7 % (ref 4–12)
NEUTROPHILS # BLD AUTO: 6.2 10*3/MM3 (ref 1.87–8.4)
NEUTROPHILS NFR BLD AUTO: 60.9 % (ref 39–78)
PLATELET # BLD AUTO: 370 10*3/MM3 (ref 130–400)
POTASSIUM SERPL-SCNC: 5.4 MMOL/L (ref 3.5–5.3)
PROT SERPL-MCNC: 8.2 G/DL (ref 6.3–8.7)
RBC # BLD AUTO: 4.59 10*6/MM3 (ref 4.8–5.9)
SODIUM SERPL-SCNC: 140 MMOL/L (ref 135–145)
TRIGL SERPL-MCNC: 520 MG/DL (ref 0–149)
VLDLC SERPL CALC-MCNC: ABNORMAL MG/DL
WBC # BLD AUTO: 10.18 10*3/MM3 (ref 4.8–10.8)

## 2017-11-24 DIAGNOSIS — E78.49 OTHER HYPERLIPIDEMIA: ICD-10-CM

## 2017-11-24 DIAGNOSIS — I10 ESSENTIAL HYPERTENSION: ICD-10-CM

## 2017-11-24 DIAGNOSIS — R89.9 ABNORMAL LABORATORY TEST: Primary | ICD-10-CM

## 2017-11-24 RX ORDER — FENOFIBRATE 145 MG/1
145 TABLET, COATED ORAL DAILY
Qty: 90 TABLET | Refills: 0 | Status: SHIPPED | OUTPATIENT
Start: 2017-11-24 | End: 2018-01-31 | Stop reason: SDUPTHER

## 2017-12-14 DIAGNOSIS — D50.8 OTHER IRON DEFICIENCY ANEMIA: Primary | ICD-10-CM

## 2017-12-19 ENCOUNTER — APPOINTMENT (OUTPATIENT)
Dept: LAB | Facility: HOSPITAL | Age: 52
End: 2017-12-19

## 2018-01-08 DIAGNOSIS — E78.5 HYPERLIPIDEMIA, UNSPECIFIED HYPERLIPIDEMIA TYPE: Primary | ICD-10-CM

## 2018-01-09 ENCOUNTER — APPOINTMENT (OUTPATIENT)
Dept: LAB | Facility: HOSPITAL | Age: 53
End: 2018-01-09

## 2018-01-11 DIAGNOSIS — I10 HYPERTENSION, UNSPECIFIED TYPE: ICD-10-CM

## 2018-01-14 RX ORDER — SPIRONOLACTONE 25 MG/1
TABLET ORAL
Qty: 30 TABLET | Refills: 0 | OUTPATIENT
Start: 2018-01-14

## 2018-01-14 NOTE — TELEPHONE ENCOUNTER
Patient potassium level last noted to be 5.4 in Nov.  Patient has not had more recent labs drawn. I was uncomfortable sending this rx in when patient is noted to have elevated potassium level, is on potassium supplements, and the spironolactone. I will defer to you since you know the patient better.

## 2018-01-17 NOTE — TELEPHONE ENCOUNTER
I have called [facility] pharmacy they filled a rx for the patient 01/11/2018 for 30 days patient has enough medication until 02/10/18. Patient has been called and notified that he will need to be seen before further rx given

## 2018-01-31 DIAGNOSIS — R89.9 ABNORMAL LABORATORY TEST: ICD-10-CM

## 2018-02-02 RX ORDER — FENOFIBRATE 145 MG/1
TABLET, COATED ORAL
Qty: 90 TABLET | Refills: 0 | Status: SHIPPED | OUTPATIENT
Start: 2018-02-02 | End: 2018-05-16 | Stop reason: SDUPTHER

## 2018-02-10 DIAGNOSIS — I10 HYPERTENSION, UNSPECIFIED TYPE: ICD-10-CM

## 2018-02-10 RX ORDER — SPIRONOLACTONE 25 MG/1
TABLET ORAL
Qty: 30 TABLET | Refills: 0 | Status: CANCELLED | OUTPATIENT
Start: 2018-02-10

## 2018-02-12 DIAGNOSIS — I10 HYPERTENSION, UNSPECIFIED TYPE: ICD-10-CM

## 2018-02-12 RX ORDER — SPIRONOLACTONE 25 MG/1
25 TABLET ORAL DAILY
Qty: 30 TABLET | Refills: 0 | Status: SHIPPED | OUTPATIENT
Start: 2018-02-12 | End: 2018-03-20 | Stop reason: SDUPTHER

## 2018-02-14 ENCOUNTER — OFFICE VISIT (OUTPATIENT)
Dept: FAMILY MEDICINE CLINIC | Facility: CLINIC | Age: 53
End: 2018-02-14

## 2018-02-14 VITALS
WEIGHT: 208.7 LBS | SYSTOLIC BLOOD PRESSURE: 122 MMHG | HEIGHT: 68 IN | OXYGEN SATURATION: 98 % | BODY MASS INDEX: 31.63 KG/M2 | TEMPERATURE: 97.8 F | HEART RATE: 82 BPM | DIASTOLIC BLOOD PRESSURE: 81 MMHG | RESPIRATION RATE: 18 BRPM

## 2018-02-14 DIAGNOSIS — D50.8 OTHER IRON DEFICIENCY ANEMIA: ICD-10-CM

## 2018-02-14 DIAGNOSIS — I10 ESSENTIAL HYPERTENSION: ICD-10-CM

## 2018-02-14 DIAGNOSIS — E78.2 MIXED HYPERLIPIDEMIA: ICD-10-CM

## 2018-02-14 DIAGNOSIS — I10 HYPERTENSION, UNSPECIFIED TYPE: ICD-10-CM

## 2018-02-14 DIAGNOSIS — F33.1 MODERATE EPISODE OF RECURRENT MAJOR DEPRESSIVE DISORDER (HCC): ICD-10-CM

## 2018-02-14 DIAGNOSIS — E78.49 OTHER HYPERLIPIDEMIA: ICD-10-CM

## 2018-02-14 DIAGNOSIS — M1A.4420: Primary | ICD-10-CM

## 2018-02-14 DIAGNOSIS — N52.9 ERECTILE DYSFUNCTION, UNSPECIFIED ERECTILE DYSFUNCTION TYPE: ICD-10-CM

## 2018-02-14 DIAGNOSIS — R89.9 ABNORMAL LABORATORY TEST: ICD-10-CM

## 2018-02-14 DIAGNOSIS — E66.9 OBESITY (BMI 30-39.9): ICD-10-CM

## 2018-02-14 DIAGNOSIS — F41.9 ANXIETY: ICD-10-CM

## 2018-02-14 PROCEDURE — 96372 THER/PROPH/DIAG INJ SC/IM: CPT | Performed by: NURSE PRACTITIONER

## 2018-02-14 PROCEDURE — 99214 OFFICE O/P EST MOD 30 MIN: CPT | Performed by: NURSE PRACTITIONER

## 2018-02-14 RX ORDER — DEXAMETHASONE SODIUM PHOSPHATE 10 MG/ML
10 INJECTION INTRAMUSCULAR; INTRAVENOUS ONCE
Status: COMPLETED | OUTPATIENT
Start: 2018-02-14 | End: 2018-02-14

## 2018-02-14 RX ADMIN — DEXAMETHASONE SODIUM PHOSPHATE 10 MG: 10 INJECTION INTRAMUSCULAR; INTRAVENOUS at 11:40

## 2018-02-14 NOTE — PROGRESS NOTES
Chief Complaint   Patient presents with   • Hand Pain     Patient has pain and heat in his left hand. Patient has not injured his hand. Symptoms started 2 days ago.       Allergies   Allergen Reactions   • Lipitor [Atorvastatin] Rash   • Lisinopril Angioedema       HPI: Lukasz Savage is a 53 y.o. male presents today for chronic problems, labs and refills.  HTN stable with carvediolol, clonidine, spironlactone and losartan.  Hyperlipidemia stable with fenofibrate, GERD stable with omeprazole, ED stable with vardenafi, and leg swelling stable does not take lasix every day, and says ibuprofen he only takes every once and a while do to kidney status.  Says that he has Gout in his left hand and he can't even make a fist or grab anything.  He says that he has noticed being depressed, does want to get out of bed, doesn't go to [employer], hasn't cleaned his house, feels hopeless    HTN  Currently takes carvedilol, clonidine, spironolactone and losartan  Takes medications as prescribed without missed doses, reports no episodes of hypotension or any additional adverse effects from medications(s); was seen last November 2017 for HTN.  Patient reports BP's at home are normal. Denies (chest pain/chest pressure or discomfort, shortness of breath with exertion, headaches, palpitations, irregular heart beat, tachycardia, lower extremity edema, orthopnea, near-syncope).         Lukasz Savage  has a past medical history of Angio-edema (7/3/2017); Anxiety; Arthritis; Clostridium difficile colitis; Erectile dysfunction (10/6/2016); Gout; HCAP (healthcare-associated pneumonia) (7/11/2017); Hyperlipidemia; Malignant hypertension; MSSA (methicillin susceptible Staphylococcus aureus) infection (7/31/2017); Obstructive sleep apnea; and Seizures (7/4/2017). Gout      Past Medical History:   Diagnosis Date   • Angio-edema 7/3/2017   • Anxiety    • Arthritis    • Clostridium difficile colitis    • Erectile dysfunction 10/6/2016   • Gout     • HCAP (healthcare-associated pneumonia) 7/11/2017   • Hyperlipidemia    • Malignant hypertension    • MSSA (methicillin susceptible Staphylococcus aureus) infection 7/31/2017   • Obstructive sleep apnea    • Seizures 7/4/2017     Past Surgical History:   Procedure Laterality Date   • TRACHEOSTOMY N/A 7/12/2017    Procedure: TRACHEOSTOMY WITH TRACHEOSCOPY;  Surgeon: Jairon Pierson MD;  Location: [facility];  Service:      Social History     Social History   • Marital status: Single     Spouse name: N/A   • Number of children: N/A   • Years of education: N/A     Social History Main Topics   • Smoking status: Former Smoker     Packs/day: 0.33     Years: 30.00     Types: Cigarettes     Quit date: 7/3/2017   • Smokeless tobacco: Never Used   • Alcohol use No      Comment: PER DAY FOR 30 YEARS; Last drink July 3, 2017   • Drug use: No   • Sexual activity: Defer     Other Topics Concern   • None     Social History Narrative     Family History   Problem Relation Age of Onset   • Hypertension Mother    • Diabetes Mother    • Heart disease Father    • Hypertension Father    • No Known Problems Daughter    • No Known Problems Son    • Diabetes Maternal Grandmother    • No Known Problems Maternal Grandfather    • No Known Problems Paternal Grandmother    • Heart attack Paternal Grandfather    • Kidney disease Sister        Current Outpatient Prescriptions on File Prior to Visit   Medication Sig Dispense Refill   • carvedilol (COREG) 25 MG tablet Take 1 tablet by mouth Every 12 (Twelve) Hours. For high blood pressure 180 tablet 1   • CloNIDine (CATAPRES) 0.1 MG tablet Take 1 tablet by mouth Every Night. Check BP 4x daily. If> 180 SBP take x1. 60 tablet 2   • fenofibrate (TRICOR) 145 MG tablet TAKE ONE TABLET BY MOUTH DAILY 90 tablet 0   • furosemide (LASIX) 20 MG tablet Take 1 tablet by mouth Daily. For edema in lower extremities 90 tablet 1   • ibuprofen (ADVIL,MOTRIN) 600 MG tablet Take 1 tablet by mouth Every 6 (Six)  "Hours As Needed for Mild Pain . 90 tablet 1   • losartan (COZAAR) 100 MG tablet Take 1 tablet by mouth Daily. For high blood pressure 90 tablet 1   • omeprazole (PRILOSEC) 10 MG capsule Take 1 capsule by mouth Daily. For acid reflux 90 capsule 1   • spironolactone (ALDACTONE) 25 MG tablet Take 1 tablet by mouth Daily. 30 tablet 0   • vardenafil (LEVITRA) 10 MG tablet Take 1 tablet by mouth Daily As Needed for erectile dysfunction. 10 tablet 5   • [DISCONTINUED] colchicine 0.6 MG tablet Take 2 pills now, 1 pill in one hr.  Then Can take daily 14 tablet 3   • [DISCONTINUED] potassium chloride (K-DUR,KLOR-CON) 10 MEQ CR tablet Take 1 tablet by mouth Daily. For low potassium 90 tablet 1   • [DISCONTINUED] raNITIdine (ZANTAC) 75 MG tablet Take 75 mg by mouth 2 (Two) Times a Day.       No current facility-administered medications on file prior to visit.         REVIEW OF SYMPTOMS: (Positives bolded)  General:  weight loss, fever, chills, night sweats, fatigue, appetite loss  HEENT:  blurry vision, eye pain, eye discharge, dry eyes  Respiratory: shortness of breath, cough, hemoptysis, wheezing, pleurisy,   Cardiovascular:  chest pain, PND, palpitation, edema, orthopnea, syncope, swelling of extremities  Gastro: Nausea, vomiting, diarrhea, hematemesis, abdominal pain, constipation  Genito: hematuria, dysuria, glycosuria, hesitancy, frequency, incontinence  Musckelo: Arthralgia, myalgia, muscle weakness, joint swelling, NSAID use  Skin: rash, pruritis, sores, nail changes, skin thickening, change in wart/mole, itching, rash, new lesions, pruritus, nail changes  Neuro:  Migraine, numbness, ataxia, tremor, vertigo, weakness, memory loss  \"All other systems reviewed and negative, except as listed above.”      OBJECTIVE:  Constitutional:  Appearance-No acute distress, Consistent with stated age. Orientation- Oriented x 3, alert Posture-Not doubled over. Gait-Normal pace, normal arm movement. Posture- Normal Build and " Nutrition-Well developed and well nourished.  General- Patient is pleasant and cooperative with the interview and exam.    Integumentary: General-No rashes, ulcers or lesions. No edema.  Palpation- Normal skin moisture/turgor. Skin is warm to touch, no increased warmth. Capillary refill is normal bilateral Upper and lower extremity.     Head/Neck: Head- normocephalic and atraumatic.  Neck- without visible/palpable lumps or pulsations.  Palpation- No bony tenderness about head/neck along frontal, occiptial, temporal, parietal, mastoid, jawline, zygoma, orbit or any other location.  NO temporal artery tenderness. No TMJ tenderness. Normal cervical ROM.   Neck Supple.  Thyroid-No thyromegaly, no nodules    Eye: Bilaterally PERRLA, EOMI.  No discharge.  Upper and lower eyelids are normal. Sclera/conjunctiva normal without discharge. Cornea is normal and clear. Lens is normal.  Eyeball appears normal. No ciliary flushing, no conjunctival injection.    ENMT:  Pinna- normal without tenderness or erythema.  External auditory canal Left- normal without erythema or discharge, no excessive cerumen. External auditory canal Right-normal without erythema or discharge, no excessive cerumen. TM left- Grey/pearly, normal light reflex and anatomy TM Right- Grey/pearly, normal light reflex and anatomy Hearing Assessment-normal to conversational speech at 2-5 feet.  Nose and sinus-No sinus tenderness along frontal/maxillary region. External appearance normal and midline. Nares- bilateral quiet airflow, no discharge. Nasal mucosa- No bleeding noted and no ulcerations observed. Pink, moist. Turbinates non boggy. Lips- normal color, moist without cracks/lesions Oral Cavity/Palate- hard/soft palate intact without lesions, oral mucosa pink and moist. Dentition assessed and discussed appropriate oral care. Tongue normal midline.  Oropharynx- no pharyngeal erythema, Uvula midline. No post nasal drip. No exudate. Salivary glands- Non tender to  palpation    CHEST/LUNG: Inspection- symmetric chest wall no pectus deformity. Normal effort, no distress, no use of accessory muscles. Palpation- nontender sternum, ribline.  No abnormal pulsations. Auscultation- Breath sounds normal throughout all lung fields.  Normal tracheal sounds, Normal bronchial sounds overlying sternum, Bronchovessicular sounds normal between scapulae posteriorly, Normal vessicular breath sounds heard throughout periphery. Lungs are clear today. Adventitious sounds- No wheezes, rales, rhonchi.     CARDIOVASCULAR:  Carotid artery- normal, no bruits or abnormal pulsations. Jugular vein- no pulsations. Palpation/Percussion- Normal PMI, no palpable thrill  Auscultation- Regular rate and rhythm. No murmur noted in sitting, supine positions. Extremities- no digital clubbing, cyanosis, edema, increased warmth.    ABDOMEN: Inspection- normal and no visible pulsations. Normal contour. Auscultation- Bowel sounds normal, no abdominal bruits. Palpation/Percussion- soft, non-tender, no rebound tenderness, no rigidity (guarding), no jar tenderness, no masses.  Liver-no hepatomegaly, Spleen - no splenomegaly, Hernias- none. Rectal not examined.     Peripheral Vascular: Upper extremity Left- Normal temperature with pink nailbeds and no ulcerations.  Upper extremity Right- Normal temperature with pink nailbeds and no ulcerations.  Lower extremity- Normal temperature with pink nailbeds and no ulcerations. DP pulses 2+ bilaterally.  Pedal hair intact.  Normal capillary refill. Edema- No edema.    Musculoskeletal: Generalized-No generalized swelling or edema of extremities, no digital clubbing or cyanosis, neurovascularly intact all four extremities.  L Upper extremity- Symmetrical posture.  Normal sensation along medial and lateral upper extremity proximally and distally.  NO tenderness overlying shoulder, lateral/medial epicondyle.  Elbow palpated, no tenderness overlying olecranon.  Normal supination,  pronation to active/passive ROM and to resisted rotation. Bicep insertion/tricep insertion appear normal without obvious pathology. Rotator cuff evaluated and intact.  Normal wrist ROM bilaterally. Normal hand movement, intrinsic muscles of hands normal.  Has 1+ edema in the left hand.  Has tenderness on palpation of the whole hand, has decreased sensation of hand.  Has decreased ROM of left hand.   3/5 and strength 3/5 .   R Upper extremity- Symmetrical posture.  No visible deformity.  Normal sensation along medial and lateral upper extremity proximally and distally.  NO tenderness overlying shoulder, lateral/medial epicondyle.  5/5 and strength 5/5.  Elbow palpated, no tenderness overlying olecranon.  Normal supination, pronation to active/passive ROM and to resisted rotation. Bicep insertion/tricep insertion appear normal without obvious pathology. Rotator cuff evaluated and intact.  Normal wrist ROM bilaterally. Normal hand movement, intrinsic muscles of hands normal. No tenderness to palpation of hands/wrists/elbows.  Lower extremity- Not tender to palpation, no pain, no swelling, edema or erythema of surrounding tissue, normal strength and tone.  Normal appearing hip ROM bilaterally without pain.  Knee ROM normal at 0-120 degrees. No tenderness overlying trochanters, no tenderness about patella, quad tendon, patellar tendon.  No tenderness at tibial tuberosity. Ankle normal ROM not tender to palpation along medial/lateral malleolus. Normal movement of toes, no tenderness bilateral feet/toes.  Normal foot type. Calves symmetrical.  Stretching demonstrated today.  Spine/Ribs- No deformities, masses or tenderness, no known fractures, normal strength, Normal ROM. Normal stability No tenderness along C/T/L spine.  Normal appearing ROM about spine.        Neuropsych: Oriented- Person, place, time. (AAOx3), Mood/affect- normal and congruent. Able to articulate well. Speech-Normal speech, normal rate, normal  tone, normal use of language, volume and coherence.  Thought content- normal with ability to perform basic computations and apply abstract thought/reason. Associations- intact, no SI/HI, no hallucinations, delusions, obsessions.  Judgment/insight- Appropriate. Memory-Recall intact, remote and recent memory intact. Knowledge- Age appropriate fund of knowledge, concentration and attention span normal.    Lymphatic: Head/Neck- normal size and non tender to palpation. Axillary- Head and neck LN are normal size and non tender to palpation. Femoral and Inguinal- normal size and non tender to palpation.      Assessment/Plan:  Lukasz was seen today for hand pain.    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        -     Pt has colchicine at home didn't know if he could take it.        -      Continue colchicine as prescribed, avoid the allopurinol    Erectile dysfunction, unspecified erectile dysfunction type      -  Continue levitra as directed    Obesity (BMI 30-39.9)   BMI:  31.17    Follow up plan:   Lose 5 pounds by next visit     -  Documentation of education   The patient BMI is outside this range and we recommended/discussed today to utilize a diet/exercise program to get back into the appropriate range.  [facility]  guidelines recommend that people under the age of 65 should have a BMI of 18.5-24.9  Food diary:  Bring to next visit  tial step is to document everything that is consumed. Pt's that have a food diary  Lose 2x the weight  by keeping track of foods.   Choose one bad food weekly and eliminate it from your diet.  Replace with one healthy food  Exercise diary: Goal over next 2-4 weeks is walk 30 minutes per day 5 days per week at pace difficult to hold conversation.   Drink more water, less soda.   Cut back on portion sizes.   Today we encouraged roughly a 1 lb per week weight loss with initial goal of 5% weight loss.  Avoid fast foods, eat more salads  If eating out for a meal, consider cutting food in half and placing into a to go container.  Individually portion any foods coming into the home   Use smaller plates  Drink 12 ozof water 30 minutes before meal as way to suppress appetite.  Discussed Contrave, metformin, topamax, Belviq and the cost of medications  Pt declines script for meds  Offered referral to dietician,  declines dietician referral   Encouraged internet programs or self help books  Set  Goals that are realistic   Educated on ways to measure food  Baseball: 1 cup good for green salad, frozen yogurt, medium piece of fruit  Handful:  ½ cup good cut fruit, cooked vegetables, pasta, rice  Egg:  ¼ cup good for dried fruit  Deck of cards: 3 ounces good for meat and poultry  Check book:  3 ounces grilled fish  Plate Method:  reduce plate size to 9 inch dinner plate.  Half of plate should be filled with non-starchy vegetables (broccoli, lettuce, cauliflower, tomatoes), ¼ plate with lean source of protein (lean chicken, turkey, fish), remaining ½ with whole grains (brown rice, potato, whole grain breads  Avoid liquid calories (regular soda, juice, coffee with cream)  Focus  on water, seltzer water and other non-calorie drinks  Replace regular sugar with non-caloric sweeteners  Avoid skipping meals: plan small  regular meals throughout the day in order to keep your hunger controlled  Consider using meal replacements if unable to plan a healthy meal (protein shake, high protein bar)  Replace all white bread with whole wheat/whole grain alternative  Swap regular salad dressings (mayonnaise, butter, or low fat or fat free alternative)  Avoid high fat, high calorie, high carbohydrate snakes (cookies, pastries, cakes)  Snack on fruits, low fat dairy (yogurt, cottage cheese)            Management plan:  Take medications as prescribed; return to the clinic of new or worsening concerns.       Risks/benefits of current and new medications discussed with the patient and or family today.  The patient/family are aware and accept that if there any side effects they should call or return to clinic as soon as possible.  Appropriate F/U discussed for topics addressed today. All questions were answered to the satisfactory state of patient/family.  Should symptoms fail to improve or worsen they agree to call or return to clinic or to go to the ER. Education handouts were offered on any new Rx if requested.  Discussed the importance of following up with any needed screening tests/labs/specialist appointments and any requested follow-up recommended by me today.  Importance of maintaining follow-up discussed and patient accepts that missed appointments can delay diagnosis and potentially lead to worsening of conditions.      Return in about 6 months (around 8/14/2018) for Recheck 1 month depressed.     Linda Hoffman, DNP, APRN-BC  02/14/2018

## 2018-02-14 NOTE — PATIENT INSTRUCTIONS
BMI:      BMI Denominator exceptions:  pts with a documented medical reason   Elderly >65 for whom weight reduction/weight gain would complicate other underlying health conditions such as:        Illness or physical  Disability        Mental illness, dementia, confusion        Nutritional deficiency, such as vitamin/mineral deficiency        Pts in an urgent or emergent medical situation where times is of the essence and to         delay treatment would jeopardize the  Pt's health status    MUST HAVE      -  Documentation of BMI     -  Documentation of follow-up plan if outside normal parameters     -  (or) documentation regarding the medical reason why a follow-up plan was not          when the BMI is outside normal parameters     -  (or) Documentation of why the pt is not eligible for BMI measurement    Follow up plan:  (proposed outline of treatment)     -  Documentation of educatio   The patient BMI is outside this range and we recommended/discussed today to utilize a diet/exercise program to get back into the appropriate range.  Federal guidelines recommend that people under the age of 65 should have a BMI of 18.5-24.9  Food diary:  Bring to next visit  tial step is to document everything that is consumed. Pt's that have a food diary  Lose 2x the weight  by keeping track of foods.   Choose one bad food weekly and eliminate it from your diet.  Replace with one healthy food  Exercise diary: Goal over next 2-4 weeks is walk 30 minutes per day 5 days per week at pace difficult to hold conversation.   Drink more water, less soda.   Cut back on portion sizes.   Today we encouraged roughly a 1 lb per week weight loss with initial goal of 5% weight loss.  Avoid fast foods, eat more salads  If eating out for a meal, consider cutting food in half and placing into a to go container.  Individually portion any foods coming into the home   Use smaller plates  Drink 12 ozof water 30 minutes before meal as way to suppress  appetite.  Discussed Contrave, metformin, topamax, Belviq and the cost of medications  Encouraged internet programs or self help books  Set  Goals that are realistic   Educated on ways to measure food  Baseball: 1 cup good for green salad, frozen yogurt, medium piece of fruit  Handful:  ½ cup good cut fruit, cooked vegetables, pasta, rice  Egg:  ¼ cup good for dried fruit  Deck of cards: 3 ounces good for meat and poultry  Check book:  3 ounces grilled fish  Plate Method:  reduce plate size to 9 inch dinner plate.  Half of plate should be filled with non-starchy vegetables (broccoli, lettuce, cauliflower, tomatoes), ¼ plate with lean source of protein (lean chicken, turkey, fish), remaining ½ with whole grains (brown rice, potato, whole grain breads  Avoid liquid calories (regular soda, juice, coffee with cream)  Focus  on water, seltzer water and other non-calorie drinks  Replace regular sugar with non-caloric sweeteners  Avoid skipping meals: plan small regular meals throughout the day in order to keep your hunger controlled  Consider using meal replacements if unable to plan a healthy meal (protein shake, high protein bar)  Replace all white bread with whole wheat/whole grain alternative  Swap regular salad dressings (mayonnaise, butter, or low fat or fat free alternative)  Avoid high fat, high calorie, high carbohydrate snakes (cookies, pastries, cakes)  Snack on fruits, low fat dairy (yogurt, cottage cheese)

## 2018-02-15 LAB
ALBUMIN SERPL-MCNC: 4.1 G/DL (ref 3.5–5)
ALBUMIN/GLOB SERPL: 1.3 G/DL (ref 1.1–2.5)
ALP SERPL-CCNC: 74 U/L (ref 24–120)
ALT SERPL-CCNC: 21 U/L (ref 0–54)
AST SERPL-CCNC: 20 U/L (ref 7–45)
BASOPHILS # BLD AUTO: 0.08 10*3/MM3 (ref 0–0.2)
BASOPHILS NFR BLD AUTO: 0.8 % (ref 0–2)
BILIRUB SERPL-MCNC: 0.5 MG/DL (ref 0.1–1)
BUN SERPL-MCNC: 39 MG/DL (ref 5–21)
BUN/CREAT SERPL: 15.6 (ref 7–25)
CALCIUM SERPL-MCNC: 9.2 MG/DL (ref 8.4–10.4)
CHLORIDE SERPL-SCNC: 103 MMOL/L (ref 98–110)
CHOLEST SERPL-MCNC: 250 MG/DL (ref 130–200)
CO2 SERPL-SCNC: 21 MMOL/L (ref 24–31)
CREAT SERPL-MCNC: 2.5 MG/DL (ref 0.5–1.4)
EOSINOPHIL # BLD AUTO: 0.29 10*3/MM3 (ref 0–0.7)
EOSINOPHIL NFR BLD AUTO: 2.8 % (ref 0–4)
ERYTHROCYTE [DISTWIDTH] IN BLOOD BY AUTOMATED COUNT: 15.5 % (ref 12–15)
GFR SERPLBLD CREATININE-BSD FMLA CKD-EPI: 27 ML/MIN/1.73
GFR SERPLBLD CREATININE-BSD FMLA CKD-EPI: 33 ML/MIN/1.73
GLOBULIN SER CALC-MCNC: 3.2 GM/DL
GLUCOSE SERPL-MCNC: 85 MG/DL (ref 70–100)
HCT VFR BLD AUTO: 39.6 % (ref 40–52)
HDLC SERPL-MCNC: 13 MG/DL
HGB BLD-MCNC: 12.3 G/DL (ref 14–18)
IMM GRANULOCYTES # BLD: 0.04 10*3/MM3 (ref 0–0.03)
IMM GRANULOCYTES NFR BLD: 0.4 % (ref 0–5)
LDLC SERPL CALC-MCNC: ABNORMAL MG/DL
LYMPHOCYTES # BLD AUTO: 2.71 10*3/MM3 (ref 0.72–4.86)
LYMPHOCYTES NFR BLD AUTO: 26.1 % (ref 15–45)
MCH RBC QN AUTO: 27.8 PG (ref 28–32)
MCHC RBC AUTO-ENTMCNC: 31.1 G/DL (ref 33–36)
MCV RBC AUTO: 89.4 FL (ref 82–95)
MONOCYTES # BLD AUTO: 1.01 10*3/MM3 (ref 0.19–1.3)
MONOCYTES NFR BLD AUTO: 9.7 % (ref 4–12)
NEUTROPHILS # BLD AUTO: 6.26 10*3/MM3 (ref 1.87–8.4)
NEUTROPHILS NFR BLD AUTO: 60.2 % (ref 39–78)
NRBC BLD AUTO-RTO: 0 /100 WBC (ref 0–0)
PLATELET # BLD AUTO: 300 10*3/MM3 (ref 130–400)
POTASSIUM SERPL-SCNC: 5.2 MMOL/L (ref 3.5–5.3)
PROT SERPL-MCNC: 7.3 G/DL (ref 6.3–8.7)
RBC # BLD AUTO: 4.43 10*6/MM3 (ref 4.8–5.9)
SODIUM SERPL-SCNC: 140 MMOL/L (ref 135–145)
TRIGL SERPL-MCNC: 511 MG/DL (ref 0–149)
VLDLC SERPL CALC-MCNC: ABNORMAL MG/DL
WBC # BLD AUTO: 10.39 10*3/MM3 (ref 4.8–10.8)

## 2018-02-19 ENCOUNTER — TELEPHONE (OUTPATIENT)
Dept: FAMILY MEDICINE CLINIC | Facility: CLINIC | Age: 53
End: 2018-02-19

## 2018-02-19 DIAGNOSIS — M1A.09X0 CHRONIC GOUT OF MULTIPLE SITES, UNSPECIFIED CAUSE: Primary | ICD-10-CM

## 2018-02-19 DIAGNOSIS — E78.2 MIXED HYPERLIPIDEMIA: Primary | ICD-10-CM

## 2018-02-19 RX ORDER — COLCHICINE 0.6 MG/1
TABLET ORAL
Qty: 30 TABLET | Refills: 2 | Status: SHIPPED | OUTPATIENT
Start: 2018-02-19 | End: 2018-05-16 | Stop reason: SDUPTHER

## 2018-02-20 ENCOUNTER — TELEPHONE (OUTPATIENT)
Dept: FAMILY MEDICINE CLINIC | Facility: CLINIC | Age: 53
End: 2018-02-20

## 2018-02-20 RX ORDER — COLCHICINE 0.6 MG/1
TABLET ORAL
Qty: 14 TABLET | Refills: 0 | OUTPATIENT
Start: 2018-02-20

## 2018-02-20 NOTE — TELEPHONE ENCOUNTER
Patient came in to discuss labs, He is willing to go to urology. He states that he missed apt with hematology, and is willing to go back if they will see him.

## 2018-02-21 DIAGNOSIS — N28.9 KIDNEY DISEASE: Primary | ICD-10-CM

## 2018-02-21 LAB
URATE SERPL-MCNC: 9.4 MG/DL (ref 3.5–8.5)
WRITTEN AUTHORIZATION: NORMAL

## 2018-02-21 NOTE — TELEPHONE ENCOUNTER
Please give pt phone number to hematology.  Tell him he needs to call hematology and reschedule.  I will make referral to nephology not urology.

## 2018-02-23 ENCOUNTER — TELEPHONE (OUTPATIENT)
Dept: FAMILY MEDICINE CLINIC | Facility: CLINIC | Age: 53
End: 2018-02-23

## 2018-02-23 NOTE — TELEPHONE ENCOUNTER
I have called the patients mother she will contact Hematology and set the appt back up for the patient that he missed in January

## 2018-02-24 DIAGNOSIS — M1A.09X0 CHRONIC GOUT OF MULTIPLE SITES, UNSPECIFIED CAUSE: Primary | ICD-10-CM

## 2018-02-28 DIAGNOSIS — E78.2 MIXED HYPERLIPIDEMIA: Primary | ICD-10-CM

## 2018-03-02 RX ORDER — CHLORAL HYDRATE 500 MG
2000 CAPSULE ORAL
Qty: 180 CAPSULE | Refills: 1 | Status: SHIPPED | OUTPATIENT
Start: 2018-03-02 | End: 2018-05-16 | Stop reason: SDUPTHER

## 2018-03-06 ENCOUNTER — TRANSCRIBE ORDERS (OUTPATIENT)
Dept: ADMINISTRATIVE | Facility: HOSPITAL | Age: 53
End: 2018-03-06

## 2018-03-06 DIAGNOSIS — I10 ESSENTIAL HYPERTENSION, MALIGNANT: ICD-10-CM

## 2018-03-06 DIAGNOSIS — N18.4 CHRONIC KIDNEY DISEASE, STAGE IV (SEVERE) (HCC): Primary | ICD-10-CM

## 2018-03-07 ENCOUNTER — OFFICE VISIT (OUTPATIENT)
Dept: FAMILY MEDICINE CLINIC | Facility: CLINIC | Age: 53
End: 2018-03-07

## 2018-03-07 VITALS
HEIGHT: 68 IN | OXYGEN SATURATION: 98 % | TEMPERATURE: 98.1 F | HEART RATE: 87 BPM | BODY MASS INDEX: 32.74 KG/M2 | WEIGHT: 216 LBS | DIASTOLIC BLOOD PRESSURE: 89 MMHG | SYSTOLIC BLOOD PRESSURE: 122 MMHG | RESPIRATION RATE: 18 BRPM

## 2018-03-07 DIAGNOSIS — M10.022 ACUTE IDIOPATHIC GOUT OF LEFT ELBOW: Primary | ICD-10-CM

## 2018-03-07 PROCEDURE — 99213 OFFICE O/P EST LOW 20 MIN: CPT | Performed by: NURSE PRACTITIONER

## 2018-03-07 PROCEDURE — 96372 THER/PROPH/DIAG INJ SC/IM: CPT | Performed by: NURSE PRACTITIONER

## 2018-03-07 RX ORDER — DEXAMETHASONE SODIUM PHOSPHATE 4 MG/ML
4 INJECTION, SOLUTION INTRA-ARTICULAR; INTRALESIONAL; INTRAMUSCULAR; INTRAVENOUS; SOFT TISSUE ONCE
Status: COMPLETED | OUTPATIENT
Start: 2018-03-07 | End: 2018-03-07

## 2018-03-07 RX ORDER — METHYLPREDNISOLONE 4 MG/1
TABLET ORAL
Qty: 1 EACH | Refills: 1 | Status: SHIPPED | OUTPATIENT
Start: 2018-03-07 | End: 2018-03-19

## 2018-03-07 RX ORDER — DEXAMETHASONE SODIUM PHOSPHATE 4 MG/ML
4 INJECTION, SOLUTION INTRA-ARTICULAR; INTRALESIONAL; INTRAMUSCULAR; INTRAVENOUS; SOFT TISSUE ONCE
Qty: 1 ML | Refills: 0 | Status: SHIPPED | OUTPATIENT
Start: 2018-03-07 | End: 2018-03-07

## 2018-03-07 RX ADMIN — DEXAMETHASONE SODIUM PHOSPHATE 4 MG: 4 INJECTION, SOLUTION INTRA-ARTICULAR; INTRALESIONAL; INTRAMUSCULAR; INTRAVENOUS; SOFT TISSUE at 15:07

## 2018-03-07 NOTE — PATIENT INSTRUCTIONS
Gout  Gout is painful swelling that can occur in some of your joints. Gout is a type of arthritis. This condition is caused by having too much uric acid in your body. Uric acid is a chemical that forms when your body breaks down substances called purines. Purines are important for building body proteins.  When your body has too much uric acid, sharp crystals can form and build up inside your joints. This causes pain and swelling. Gout attacks can happen quickly and be very painful (acute gout). Over time, the attacks can affect more joints and become more frequent (chronic gout). Gout can also cause uric acid to build up under your skin and inside your kidneys.  What are the causes?  This condition is caused by too much uric acid in your blood. This can occur because:  · Your kidneys do not remove enough uric acid from your blood. This is the most common cause.  · Your body makes too much uric acid. This can occur with some cancers and cancer treatments. It can also occur if your body is breaking down too many red blood cells (hemolytic anemia).  · You eat too many foods that are high in purines. These foods include organ meats and some seafood. Alcohol, especially beer, is also high in purines.  A gout attack may be triggered by trauma or stress.  What increases the risk?  This condition is more likely to develop in people who:  · Have a family history of gout.  · Are male and middle-aged.  · Are female and have gone through menopause.  · Are obese.  · Frequently drink alcohol, especially beer.  · Are dehydrated.  · Lose weight too quickly.  · Have an organ transplant.  · Have lead poisoning.  · Take certain medicines, including aspirin, cyclosporine, diuretics, levodopa, and niacin.  · Have kidney disease or psoriasis.  What are the signs or symptoms?  An attack of acute gout happens quickly. It usually occurs in just one joint. The most common place is the big toe. Attacks often start at night. Other joints that  may be affected include joints of the feet, ankle, knee, fingers, wrist, or elbow. Symptoms may include:  · Severe pain.  · Warmth.  · Swelling.  · Stiffness.  · Tenderness. The affected joint may be very painful to touch.  · Shiny, red, or purple skin.  · Chills and fever.  Chronic gout may cause symptoms more frequently. More joints may be involved. You may also have white or yellow lumps (tophi) on your hands or feet or in other areas near your joints.  How is this diagnosed?  This condition is diagnosed based on your symptoms, medical history, and physical exam. You may have tests, such as:  · Blood tests to measure uric acid levels.  · Removal of joint fluid with a needle (aspiration) to look for uric acid crystals.  · X-rays to look for joint damage.  How is this treated?  Treatment for this condition has two phases: treating an acute attack and preventing future attacks. Acute gout treatment may include medicines to reduce pain and swelling, including:  · NSAIDs.  · Steroids. These are strong anti-inflammatory medicines that can be taken by mouth (orally) or injected into a joint.  · Colchicine. This medicine relieves pain and swelling when it is taken soon after an attack. It can be given orally or through an IV tube.  Preventive treatment may include:  · Daily use of smaller doses of NSAIDs or colchicine.  · Use of a medicine that reduces uric acid levels in your blood.  · Changes to your diet. You may need to see a specialist about healthy eating (dietitian).  Follow these instructions at home:  During a Gout Attack   · If directed, apply ice to the affected area:  ¨ Put ice in a plastic bag.  ¨ Place a towel between your skin and the bag.  ¨ Leave the ice on for 20 minutes, 2-3 times a day.  · Rest the joint as much as possible. If the affected joint is in your leg, you may be given crutches to use.  · Raise (elevate) the affected joint above the level of your heart as often as possible.  · Drink enough  fluids to keep your urine clear or pale yellow.  · Take over-the-counter and prescription medicines only as told by your health care provider.  · Do not drive or operate heavy machinery while taking prescription pain medicine.  · Follow instructions from your health care provider about eating or drinking restrictions.  · Return to your normal activities as told by your health care provider. Ask your health care provider what activities are safe for you.  Avoiding Future Gout Attacks   · Follow a low-purine diet as told by your dietitian or health care provider. Avoid foods and drinks that are high in purines, including liver, kidney, anchovies, asparagus, herring, mushrooms, mussels, and beer.  · Limit alcohol intake to no more than 1 drink a day for nonpregnant women and 2 drinks a day for men. One drink equals 12 oz of beer, 5 oz of wine, or 1½ oz of hard liquor.  · Maintain a healthy weight or lose weight if you are overweight. If you want to lose weight, talk with your health care provider. It is important that you do not lose weight too quickly.  · Start or maintain an exercise program as told by your health care provider.  · Drink enough fluids to keep your urine clear or pale yellow.  · Take over-the-counter and prescription medicines only as told by your health care provider.  · Keep all follow-up visits as told by your health care provider. This is important.  Contact a health care provider if:  · You have another gout attack.  · You continue to have symptoms of a gout attack after10 days of treatment.  · You have side effects from your medicines.  · You have chills or a fever.  · You have burning pain when you urinate.  · You have pain in your lower back or belly.  Get help right away if:  · You have severe or uncontrolled pain.  · You cannot urinate.  This information is not intended to replace advice given to you by your health care provider. Make sure you discuss any questions you have with your health  care provider.  Document Released: 12/15/2001 Document Revised: 05/25/2017 Document Reviewed: 09/29/2016  [employer] Interactive Patient Education © 2017 [employer]    Gout diet: What's allowed, what's not  By [facility] Staff   Definition  Gout, a painful form of arthritis, occurs when high levels of uric acid in the blood cause crystals to form and accumulate around a joint.  Uric acid is produced when the body breaks down a chemical called purine. Purine occurs naturally in your body, but it's also found in certain foods. Uric acid is eliminated from the body in urine.  A gout diet may help decrease uric acid levels in the blood. While a gout diet is not a cure, it may lower the risk of recurring painful gout attacks and slow the progression of joint damage. Medication also is needed to manage pain and to lower levels of uric acid.  Purpose  A little history  Gout has been associated for centuries with overindulgence in meats, seafood and alcohol. The condition was, in fact, considered a disease of the wealthiest people -- those who could afford such eating habits. And long before the cause of gout was understood, doctors had observed some benefit of a restricted diet on gout management.  For many years, treatment for gout focused on eliminating all foods that had moderate to high amounts of purine. The list of foods to avoid was long, which made the diet difficult to follow.  Current understanding  More recent research on gout has created a clearer picture of the role of diet in disease management. Some foods should be avoided, but not all foods with purines should be eliminated. And some foods should be included in your diet to control uric acid levels.  The purpose of a gout diet today is to address all factors related to disease risk and management. Above all, the goals are a healthy weight and healthy eating -- a message that applies to lowering the risk of many diseases.  Diet details  The general  principles of a gout diet are essentially the same as recommendations for a balanced, healthy diet:  Weight loss. Being overweight increases the risk of developing gout, and losing weight lowers the risk of gout. Research suggests that reducing the number of calories and losing weight -- even without a purine-restricted diet -- lowers uric acid levels and reduces the number of gout attacks. Losing weight also lessens the overall stress on joints.   Complex carbs. Eat more fruits, vegetables and whole grains, which provide complex carbohydrates. Avoid foods such as white bread, cakes, candy, sugar-sweetened beverages and products with high-fructose corn syrup.   Water. Keep yourself hydrated by drinking water. An increase in water consumption has been linked to fewer gout attacks. Aim for eight to 16 glasses of fluids a day with at least half of that as water. A glass is 8 ounces (237 milliliters). Talk to your doctor about appropriate fluid intake goals for you.   Fats. Cut back on saturated fats from red meats, fatty poultry and high-fat dairy products.   Proteins. Limit daily proteins from lean meat, fish and poultry to 4 to 6 ounces (113 to 170 grams). Add protein to your diet with low-fat or fat-free dairy products, such as low-fat yogurt or skim milk, which are associated with reduced uric acid levels.  Recommendations for specific foods or supplements include the following:  High-purine vegetables. Studies have shown that vegetables high in purines do not increase the risk of gout or recurring gout attacks. A healthy diet based on lots of fruits and vegetables can include high-purine vegetables, such as asparagus, spinach, peas, cauliflower or mushrooms. You can also eat beans or lentils, which are moderately high in purines but are also a good source of protein.   Organ and glandular meats. Avoid meats such as liver, kidney and sweetbreads, which have high purine levels and contribute to high blood levels of  uric acid.   Selected seafood. Avoid the following types of seafood, which are higher in purines than others: anchovies, herring, sardines, mussels, scallops, trout, yusuf, mackerel and tuna.   Alcohol. The metabolism of alcohol in your body is thought to increase uric acid production, and alcohol contributes to dehydration. Beer is associated with an increased risk of gout and recurring attacks, as are distilled liquors to some extent. The effect of wine is not as well-understood. If you drink alcohol, talk to your doctor about what is appropriate for you.   Vitamin C. Vitamin C may help lower uric acid levels. Talk to your doctor about whether a 500-milligram vitamin C supplement fits into your diet and medication plan.   Coffee. Some research suggests that moderate coffee consumption may be associated with a reduced risk of gout, particularly with regular caffeinated coffee. Drinking coffee may not be appropriate for other medical conditions. Talk to your doctor about how much coffee is right for you.   Cherries. There is some evidence that eating cherries is associated with a reduced risk of gout attacks.  A sample menu  Here's a look at what you might eat during a typical day on a gout diet:  Breakfast  Whole-grain, unsweetened cereal with skim or low-fat milk   1 cup fresh strawberries   Coffee   Water  Lunch  Roasted chicken breast slices (2 ounces) on a whole-grain roll with mustard   Mixed green salad with balsamic vinegar and olive oil dressing   Skim or low-fat milk   Water  Afternoon snack  1 cup fresh cherries   Water  Dinner  Roasted salmon (3-4 ounces)   Roasted or steamed green beans   1/2 cup whole-grain pasta with olive oil and lemon pepper   Water   Low-fat yogurt   1 cup fresh melon   Caffeine-free beverage, such as herbal tea

## 2018-03-07 NOTE — PROGRESS NOTES
Chief Complaint   Patient presents with   • Gout     Patient has Gout in his left arm. Patient said he is in extreme pain from elbow to tip of his fingers.         Allergies   Allergen Reactions   • Lipitor [Atorvastatin] Rash   • Lisinopril Angioedema       HPI:  Lukasz Savage is a 53 y.o. male presents today with complaints of gout in his left elbow, to the tip of the fingers.   He seen kidney Dr. Mcgarry and was told his kidnys are only functioning at 27% and he can't have any allopurinol, colchicine, or nsaids. Has been taking tylenol but it is not helping.   Denies any alcohol use, has ate red meat in the last week.  He begs for norco but I don't think it is the solution and I discussed this with him.     HPI: Lukasz Savage is a 53 y.o. male presents today for chronic problems, labs and refills.  HTN stable with carvediolol, clonidine, spironlactone and losartan.  Hyperlipidemia stable with fenofibrate, GERD stable with omeprazole, ED stable with vardenafi, and leg swelling stable does not take lasix every day, and says ibuprofen he only takes every once and a while do to kidney status.  Says that he has Gout in his left hand and he can't even make a fist or grab anything.  He says that he has noticed being depressed, does want to get out of bed, doesn't go to [employer], hasn't cleaned his house, feels hopeless    Past Medical History:   Diagnosis Date   • Angio-edema 7/3/2017   • Anxiety    • Arthritis    • Clostridium difficile colitis    • Erectile dysfunction 10/6/2016   • Gout    • HCAP (healthcare-associated pneumonia) 7/11/2017   • Hyperlipidemia    • Malignant hypertension    • MSSA (methicillin susceptible Staphylococcus aureus) infection 7/31/2017   • Obstructive sleep apnea    • Seizures 7/4/2017     Past Surgical History:   Procedure Laterality Date   • TRACHEOSTOMY N/A 7/12/2017    Procedure: TRACHEOSTOMY WITH TRACHEOSCOPY;  Surgeon: Jairon Pierson MD;  Location: [facility];  Service:       Social History     Social History   • Marital status: Single     Social History Main Topics   • Smoking status: Former Smoker     Packs/day: 0.33     Years: 30.00     Types: Cigarettes     Quit date: 7/3/2017   • Smokeless tobacco: Never Used   • Alcohol use No      Comment: PER DAY FOR 30 YEARS; Last drink July 3, 2017   • Drug use: No   • Sexual activity: Defer     Family History   Problem Relation Age of Onset   • Hypertension Mother    • Diabetes Mother    • Heart disease Father    • Hypertension Father    • No Known Problems Daughter    • No Known Problems Son    • Diabetes Maternal Grandmother    • No Known Problems Maternal Grandfather    • No Known Problems Paternal Grandmother    • Heart attack Paternal Grandfather    • Kidney disease Sister        Current Outpatient Prescriptions on File Prior to Visit   Medication Sig Dispense Refill   • carvedilol (COREG) 25 MG tablet Take 1 tablet by mouth Every 12 (Twelve) Hours. For high blood pressure 180 tablet 1   • CloNIDine (CATAPRES) 0.1 MG tablet Take 1 tablet by mouth Every Night. Check BP 4x daily. If> 180 SBP take x1. 60 tablet 2   • colchicine 0.6 MG tablet Take 2 tabs now, repeat 1 tab in an hour.  May repeat same steps again in 24 hours if needed.  He has chronic gout 30 tablet 2   • fenofibrate (TRICOR) 145 MG tablet TAKE ONE TABLET BY MOUTH DAILY 90 tablet 0   • furosemide (LASIX) 20 MG tablet Take 1 tablet by mouth Daily. For edema in lower extremities 90 tablet 1   • losartan (COZAAR) 100 MG tablet Take 1 tablet by mouth Daily. For high blood pressure 90 tablet 1   • Omega-3 Fatty Acids (FISH OIL) 1000 MG capsule capsule Take 2 capsules by mouth Daily With Breakfast. 180 capsule 1   • omeprazole (PRILOSEC) 10 MG capsule Take 1 capsule by mouth Daily. For acid reflux 90 capsule 1   • sertraline (ZOLOFT) 50 MG tablet Take 0.5 of tab nightly  X 7 days then increase 1 tab nightly 60 tablet 0   • spironolactone (ALDACTONE) 25 MG tablet Take 1 tablet by  "mouth Daily. 30 tablet 0   • vardenafil (LEVITRA) 10 MG tablet Take 1 tablet by mouth Daily As Needed for erectile dysfunction. 10 tablet 5   • [DISCONTINUED] ibuprofen (ADVIL,MOTRIN) 600 MG tablet Take 1 tablet by mouth Every 6 (Six) Hours As Needed for Mild Pain . 90 tablet 1     No current facility-administered medications on file prior to visit.         ROS:  REVIEW OF SYMPTOMS: (Positives bolded)  General:  weight loss, fever, chills, night sweats, fatigue, appetite loss  HEENT:  blurry vision, eye pain, eye discharge, dry eyes, decreased vision, sore throat tinnitus, bloody nose, hearin gloss, sinus pain/pressure, ear pain/pressure.   Respiratory: shortness of breath, cough, hemoptysis, wheezing, pleurisy,   Cardiovascular:  chest pain, PND, palpitation, edema, orthopnea, syncope, swelling of extremities  Gastro: Nausea, vomiting, diarrhea, hematemesis, abdominal pain, constipation  Genito: hematuria, dysuria, glycosuria, hesitancy, frequency, incontinence  Musckelo: Arthralgia, myalgia, muscle weakness, joint swelling, NSAID use  Skin: rash, pruritis, sores, nail changes, skin thickening, change in wart/mole, itching, rash, new lesions, pruritus, nail changes  Neuro:  Migraine, numbness, ataxia, tremor, vertigo, weakness, memory loss, Irritability, dizziness  \"All other systems reviewed and negative, except as listed above.”      OBJECTIVE:  Constitutional:  Alert, oriented x 3, well developed, well nourished. Consistent with stated age. Not in acute distress.  Has normal posture. Gait and station abnormal.  Behavior appropriate. Patient is pleasant and cooperative with the interview and exam.    Skin: No rashes, no visible scars or suspicious moles noted.  Skin is warm to touch. Normal appropriate skin turgor.  Capillary refill is normal bilateral Upper and lower extremity.     Head/Neck: Head is normocephalic and atraumatic.  Neck without visible/palpable lumps.  No thyromegaly.    CHEST/LUNG: No use of " accessory muscles, chest non-tender on palpation.  Breath sounds normal throughout all lung fields.  No wheezes, rales, rhonchi.    CARDIOVASCULAR:  Inspection: Carotid artery bilateral normal, no bruits, pulse regular/irregular.  Palpation/Percussion Bilateral normal pulsations.  Auscultation: Regular rate and rhythm. No murmur noted in sitting, supine, standing or squatting positions.    MUSCULOSKELETAL:   Has left arm in a sling, tenderness on palpation of the whole elbow, can not move up and down or side to side.  Has 1+ edema elbow.          Left: No callus formation, no pre-ulcer areas, no hammer toe, no claw toe, no deformity.   Abnormal:  redness, warmth, tenderness on palpation:      LYMPH: Cervical Nodes-normal, size; non-tender to palpation. Axillary Nodes- normal size; non-tender to palpation.   Femoral Nodes- normal, size; non-tender to palpation.      Assessment:  Lukasz was seen today for gout.    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        -     MethylPREDNISolone (MEDROL, PARKER,) 4 MG tablet; Take as directed on package instructions.  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 Discuss triggers of attacks and need for diet and lifestyle changes  b. In selected pts, provide instructions for immediately initiating  drug therapy when signs and       Symptoms occur  c. Pharmacologic treatment: Start drugs within 24 hours of onset of attack and do not interrupt during       an attack  d. Acute attacks usually resolve within 48 hours if managed properly  • Pharmacologic Treatment:  a.  Initially prescribe one agent (NSAIDS, oral colchicine or systemic corticosteroid)  b.  If pt does not respond to initial monotherapy, add a second agent          Education:  • Gradual weight reduction  • Teach about healthy diets:    a. Encourage consumption of low fat or non fat dairy products and vegetables  b. Limit meat, seafood with high purine content (sardines, shellfish), fruit juice, table salt (sauces and       gravies) and sugar (table sugar, sweetened beverages, desserts)  c. Avoid high fructose soda and foods and organ meats (sweetbread, liver, kidney)  • Limit alcohol use; avoid any alcohol use during periods of frequent gout attacks or advanced gout under poor control  • Increase fluid intake to 3 Liters/day  • Advise pts with chronic gout on drug treatment that control of disease is a lifelong commitment  • Although low dose aspirin may elevated serum urate, advise pts who are using aspirin as a cardiovascular disease prophylactic to remain on the therapy      Return in about 1 week (around 3/14/2018), or if symptoms worsen or fail to improve.      Linda Hoffman, DNP, APRN-BC  03/07/2018

## 2018-03-13 DIAGNOSIS — F33.1 MODERATE EPISODE OF RECURRENT MAJOR DEPRESSIVE DISORDER (HCC): ICD-10-CM

## 2018-03-19 ENCOUNTER — APPOINTMENT (OUTPATIENT)
Dept: LAB | Facility: HOSPITAL | Age: 53
End: 2018-03-19

## 2018-03-19 ENCOUNTER — CONSULT (OUTPATIENT)
Dept: ONCOLOGY | Facility: CLINIC | Age: 53
End: 2018-03-19

## 2018-03-19 VITALS
HEART RATE: 72 BPM | HEIGHT: 68 IN | TEMPERATURE: 97.1 F | SYSTOLIC BLOOD PRESSURE: 126 MMHG | OXYGEN SATURATION: 96 % | RESPIRATION RATE: 16 BRPM | DIASTOLIC BLOOD PRESSURE: 84 MMHG

## 2018-03-19 DIAGNOSIS — E78.5 HYPERLIPIDEMIA, UNSPECIFIED HYPERLIPIDEMIA TYPE: Primary | ICD-10-CM

## 2018-03-19 DIAGNOSIS — D64.9 ANEMIA, UNSPECIFIED TYPE: Primary | ICD-10-CM

## 2018-03-19 DIAGNOSIS — E78.2 MIXED HYPERLIPIDEMIA: Primary | ICD-10-CM

## 2018-03-19 LAB
ALBUMIN SERPL-MCNC: 4.3 G/DL (ref 3.5–5)
ALBUMIN/GLOB SERPL: 1.2 G/DL (ref 1.1–2.5)
ALP SERPL-CCNC: 58 U/L (ref 24–120)
ALT SERPL W P-5'-P-CCNC: 37 U/L (ref 0–54)
ANION GAP SERPL CALCULATED.3IONS-SCNC: 15 MMOL/L (ref 4–13)
AST SERPL-CCNC: 40 U/L (ref 7–45)
BASOPHILS # BLD AUTO: 0.05 10*3/MM3 (ref 0–0.2)
BASOPHILS NFR BLD AUTO: 0.5 % (ref 0–2)
BILIRUB SERPL-MCNC: 0.6 MG/DL (ref 0.1–1)
BUN BLD-MCNC: 37 MG/DL (ref 5–21)
BUN/CREAT SERPL: 17.6 (ref 7–25)
CALCIUM SPEC-SCNC: 9.8 MG/DL (ref 8.4–10.4)
CHLORIDE SERPL-SCNC: 103 MMOL/L (ref 98–110)
CO2 SERPL-SCNC: 27 MMOL/L (ref 24–31)
CREAT BLD-MCNC: 2.1 MG/DL (ref 0.5–1.4)
CYTOLOGIST CVX/VAG CYTO: NORMAL
DEPRECATED RDW RBC AUTO: 49.6 FL (ref 40–54)
EOSINOPHIL # BLD AUTO: 0.34 10*3/MM3 (ref 0–0.7)
EOSINOPHIL NFR BLD AUTO: 3.7 % (ref 0–4)
ERYTHROCYTE [DISTWIDTH] IN BLOOD BY AUTOMATED COUNT: 15.7 % (ref 12–15)
FERRITIN SERPL-MCNC: 115 NG/ML (ref 17.9–464)
FOLATE SERPL-MCNC: >20 NG/ML
GFR SERPL CREATININE-BSD FRML MDRD: 33 ML/MIN/1.73
GLOBULIN UR ELPH-MCNC: 3.6 GM/DL
GLUCOSE BLD-MCNC: 104 MG/DL (ref 70–100)
HCT VFR BLD AUTO: 39.1 % (ref 40–52)
HGB BLD-MCNC: 12.8 G/DL (ref 14–18)
HOLD SPECIMEN: NORMAL
IMM GRANULOCYTES # BLD: 0.06 10*3/MM3 (ref 0–0.03)
IMM GRANULOCYTES NFR BLD: 0.7 % (ref 0–5)
IRON 24H UR-MRATE: 117 MCG/DL (ref 42–180)
IRON SATN MFR SERPL: 28 % (ref 20–45)
LDH SERPL-CCNC: 533 U/L (ref 265–665)
LYMPHOCYTES # BLD AUTO: 2.81 10*3/MM3 (ref 0.72–4.86)
LYMPHOCYTES NFR BLD AUTO: 30.6 % (ref 15–45)
MCH RBC QN AUTO: 28.4 PG (ref 28–32)
MCHC RBC AUTO-ENTMCNC: 32.7 G/DL (ref 33–36)
MCV RBC AUTO: 86.7 FL (ref 82–95)
MONOCYTES # BLD AUTO: 0.57 10*3/MM3 (ref 0.19–1.3)
MONOCYTES NFR BLD AUTO: 6.2 % (ref 4–12)
NEUTROPHILS # BLD AUTO: 5.35 10*3/MM3 (ref 1.87–8.4)
NEUTROPHILS NFR BLD AUTO: 58.3 % (ref 39–78)
NRBC BLD MANUAL-RTO: 0 /100 WBC (ref 0–0)
PATH INTERP BLD-IMP: NORMAL
PLATELET # BLD AUTO: 319 10*3/MM3 (ref 130–400)
PMV BLD AUTO: 9.7 FL (ref 6–12)
POTASSIUM BLD-SCNC: 4.5 MMOL/L (ref 3.5–5.3)
PROT SERPL-MCNC: 7.9 G/DL (ref 6.3–8.7)
RBC # BLD AUTO: 4.51 10*6/MM3 (ref 4.8–5.9)
SODIUM BLD-SCNC: 145 MMOL/L (ref 135–145)
TIBC SERPL-MCNC: 420 MCG/DL (ref 225–420)
VIT B12 BLD-MCNC: 407 PG/ML (ref 239–931)
WBC NRBC COR # BLD: 9.18 10*3/MM3 (ref 4.8–10.8)

## 2018-03-19 PROCEDURE — 85025 COMPLETE CBC W/AUTO DIFF WBC: CPT | Performed by: INTERNAL MEDICINE

## 2018-03-19 PROCEDURE — 36415 COLL VENOUS BLD VENIPUNCTURE: CPT

## 2018-03-19 PROCEDURE — 85060 BLOOD SMEAR INTERPRETATION: CPT | Performed by: INTERNAL MEDICINE

## 2018-03-19 PROCEDURE — 82232 ASSAY OF BETA-2 PROTEIN: CPT | Performed by: INTERNAL MEDICINE

## 2018-03-19 PROCEDURE — 83883 ASSAY NEPHELOMETRY NOT SPEC: CPT | Performed by: INTERNAL MEDICINE

## 2018-03-19 PROCEDURE — 82784 ASSAY IGA/IGD/IGG/IGM EACH: CPT | Performed by: INTERNAL MEDICINE

## 2018-03-19 PROCEDURE — 82746 ASSAY OF FOLIC ACID SERUM: CPT | Performed by: INTERNAL MEDICINE

## 2018-03-19 PROCEDURE — 82668 ASSAY OF ERYTHROPOIETIN: CPT | Performed by: INTERNAL MEDICINE

## 2018-03-19 PROCEDURE — 82607 VITAMIN B-12: CPT | Performed by: INTERNAL MEDICINE

## 2018-03-19 PROCEDURE — 83540 ASSAY OF IRON: CPT | Performed by: INTERNAL MEDICINE

## 2018-03-19 PROCEDURE — 84165 PROTEIN E-PHORESIS SERUM: CPT | Performed by: INTERNAL MEDICINE

## 2018-03-19 PROCEDURE — 83615 LACTATE (LD) (LDH) ENZYME: CPT | Performed by: INTERNAL MEDICINE

## 2018-03-19 PROCEDURE — 80053 COMPREHEN METABOLIC PANEL: CPT | Performed by: INTERNAL MEDICINE

## 2018-03-19 PROCEDURE — 83550 IRON BINDING TEST: CPT | Performed by: INTERNAL MEDICINE

## 2018-03-19 PROCEDURE — 99205 OFFICE O/P NEW HI 60 MIN: CPT | Performed by: INTERNAL MEDICINE

## 2018-03-19 PROCEDURE — 86334 IMMUNOFIX E-PHORESIS SERUM: CPT | Performed by: INTERNAL MEDICINE

## 2018-03-19 PROCEDURE — 82728 ASSAY OF FERRITIN: CPT | Performed by: INTERNAL MEDICINE

## 2018-03-19 PROCEDURE — 82525 ASSAY OF COPPER: CPT | Performed by: INTERNAL MEDICINE

## 2018-03-19 NOTE — PROGRESS NOTES
[facility]  HEMATOLOGY & ONCOLOGY        Subjective     VISIT DIAGNOSIS:   Encounter Diagnosis   Name Primary?   • Anemia, unspecified type Yes       REASON FOR VISIT:     Chief Complaint   Patient presents with   • Anemia     consult        HEMATOLOGY / ONCOLOGY HISTORY:    No history exists.     [No treatment plan]  Cancer Staging Information:  No matching staging information was found for the patient.      INTERVAL HISTORY  Patient ID: Lukasz Savage is a 53 y.o. year old male referred for anemia. Also has chronic kideny disease. He just started seeing neprologist and work-up is in progress. He underwent c-scope a year ago. Nothing was found per pt. No brbpr. Has no energy/motivation to do things. Denies cp/spb/wt loss/night sweats.      Review of Systems   Constitutional: Positive for fatigue. Negative for appetite change.   HENT: Negative.    Eyes: Negative.    Respiratory: Negative.    Cardiovascular: Negative.    Gastrointestinal: Negative.    Genitourinary: Negative.    Musculoskeletal: Negative.    Skin: Negative.    Neurological: Negative.    Hematological: Negative.    Psychiatric/Behavioral:        Pt believes he is depressed. He was started on zoloft last week            Medications:    Current Outpatient Prescriptions   Medication Sig Dispense Refill   • carvedilol (COREG) 25 MG tablet Take 1 tablet by mouth Every 12 (Twelve) Hours. For high blood pressure 180 tablet 1   • CloNIDine (CATAPRES) 0.1 MG tablet Take 1 tablet by mouth Every Night. Check BP 4x daily. If> 180 SBP take x1. 60 tablet 2   • colchicine 0.6 MG tablet Take 2 tabs now, repeat 1 tab in an hour.  May repeat same steps again in 24 hours if needed.  He has chronic gout 30 tablet 2   • fenofibrate (TRICOR) 145 MG tablet TAKE ONE TABLET BY MOUTH DAILY 90 tablet 0   • furosemide (LASIX) 20 MG tablet Take 1 tablet by mouth Daily. For edema in lower extremities 90 tablet 1   • losartan (COZAAR) 100 MG tablet Take 1  tablet by mouth Daily. For high blood pressure 90 tablet 1   • Omega-3 Fatty Acids (FISH OIL) 1000 MG capsule capsule Take 2 capsules by mouth Daily With Breakfast. 180 capsule 1   • omeprazole (PRILOSEC) 10 MG capsule Take 1 capsule by mouth Daily. For acid reflux 90 capsule 1   • sertraline (ZOLOFT) 50 MG tablet Take 0.5 of tab nightly  X 7 days then increase 1 tab nightly 60 tablet 0   • spironolactone (ALDACTONE) 25 MG tablet Take 1 tablet by mouth Daily. 30 tablet 0   • vardenafil (LEVITRA) 10 MG tablet Take 1 tablet by mouth Daily As Needed for erectile dysfunction. 10 tablet 5     No current facility-administered medications for this visit.        ALLERGIES:    Allergies   Allergen Reactions   • Lipitor [Atorvastatin] Rash   • Lisinopril Angioedema       Objective      Vitals:    03/19/18 1029   BP: 126/84   Pulse: 72   Resp: 16   Temp: 97.1 °F (36.2 °C)   SpO2: 96%       Current Status 3/19/2018   ECOG score 0       General Appearance: Patient is awake, alert, oriented and in no acute distress. Patient is welldeveloped, wellnourished, and appears stated age.  HEENT: Normocephalic. Sclerae clear, conjunctiva pink, extraocular movements intact, pupils, round, reactive to light and  accommodation. Mouth and throat are clear with moist oral mucosa.  NECK: Supple, no jugular venous distention, thyroid not enlarged.  LYMPH: No cervical, supraclavicular, axillary, or inguinal lymphadenopathy.  CHEST: Equal bilateral expansion, AP  diameter normal, resonant percussion note  LUNGS: Good air movement, no rales, rhonchi, rubs or wheezes with auscultation  CARDIO: Regular sinus rhythm, no murmurs, gallops or rubs.  ABDOMEN: Nondistended, soft, No tenderness, no guarding, no rebound, No hepatosplenomegaly. No abdominal masses. Bowel sounds positive. No hernia  GENITALIA: Not examined.  BREASTS: Not examined.  MUSKEL: No joint swelling, decreased motion, or inflammation  EXTREMS: No edema, clubbing, cyanosis, No varicose  veins.  NEURO: Grossly nonfocal, Gait is coordinated and smooth, Cognition is preserved.  SKIN: No rashes, no ecchymoses, no petechia.  PSYCH: Oriented to time, place and person. Memory is preserved. Mood and affect appear normal      RECENT LABS:  Orders Only on 03/19/2018   Component Date Value Ref Range Status   • Glucose 03/19/2018 104* 70 - 100 mg/dL Final   • BUN 03/19/2018 37* 5 - 21 mg/dL Final   • Creatinine 03/19/2018 2.10* 0.50 - 1.40 mg/dL Final   • Sodium 03/19/2018 145  135 - 145 mmol/L Final   • Potassium 03/19/2018 4.5  3.5 - 5.3 mmol/L Final   • Chloride 03/19/2018 103  98 - 110 mmol/L Final   • CO2 03/19/2018 27.0  24.0 - 31.0 mmol/L Final   • Calcium 03/19/2018 9.8  8.4 - 10.4 mg/dL Final   • Total Protein 03/19/2018 7.9  6.3 - 8.7 g/dL Final   • Albumin 03/19/2018 4.30  3.50 - 5.00 g/dL Final   • ALT (SGPT) 03/19/2018 37  0 - 54 U/L Final   • AST (SGOT) 03/19/2018 40  7 - 45 U/L Final   • Alkaline Phosphatase 03/19/2018 58  24 - 120 U/L Final   • Total Bilirubin 03/19/2018 0.6  0.1 - 1.0 mg/dL Final   • eGFR Non African Amer 03/19/2018 33* >60 mL/min/1.73 Final   • Globulin 03/19/2018 3.6  gm/dL Final   • A/G Ratio 03/19/2018 1.2  1.1 - 2.5 g/dL Final   • BUN/Creatinine Ratio 03/19/2018 17.6  7.0 - 25.0 Final   • Anion Gap 03/19/2018 15.0* 4.0 - 13.0 mmol/L Final   • WBC 03/19/2018 9.18  4.80 - 10.80 10*3/mm3 Final   • RBC 03/19/2018 4.51* 4.80 - 5.90 10*6/mm3 Final   • Hemoglobin 03/19/2018 12.8* 14.0 - 18.0 g/dL Final   • Hematocrit 03/19/2018 39.1* 40.0 - 52.0 % Final   • MCV 03/19/2018 86.7  82.0 - 95.0 fL Final   • MCH 03/19/2018 28.4  28.0 - 32.0 pg Final   • MCHC 03/19/2018 32.7* 33.0 - 36.0 g/dL Final   • RDW 03/19/2018 15.7* 12.0 - 15.0 % Final   • RDW-SD 03/19/2018 49.6  40.0 - 54.0 fl Final   • MPV 03/19/2018 9.7  6.0 - 12.0 fL Final   • Platelets 03/19/2018 319  130 - 400 10*3/mm3 Final   • Neutrophil % 03/19/2018 58.3  39.0 - 78.0 % Final   • Lymphocyte % 03/19/2018 30.6  15.0 - 45.0  % Final   • Monocyte % 03/19/2018 6.2  4.0 - 12.0 % Final   • Eosinophil % 03/19/2018 3.7  0.0 - 4.0 % Final   • Basophil % 03/19/2018 0.5  0.0 - 2.0 % Final   • Immature Grans % 03/19/2018 0.7  0.0 - 5.0 % Final   • Neutrophils, Absolute 03/19/2018 5.35  1.87 - 8.40 10*3/mm3 Final   • Lymphocytes, Absolute 03/19/2018 2.81  0.72 - 4.86 10*3/mm3 Final   • Monocytes, Absolute 03/19/2018 0.57  0.19 - 1.30 10*3/mm3 Final   • Eosinophils, Absolute 03/19/2018 0.34  0.00 - 0.70 10*3/mm3 Final   • Basophils, Absolute 03/19/2018 0.05  0.00 - 0.20 10*3/mm3 Final   • Immature Grans, Absolute 03/19/2018 0.06* 0.00 - 0.03 10*3/mm3 Final   • nRBC 03/19/2018 0.0  0.0 - 0.0 /100 WBC Final       RADIOLOGY:  No results found.         Assessment/Plan     1. Anemia: CKD vs nutritional: suspect multifactorial. Check iron panel, b12, folate ferritin, myeloma panel, epoeitin level. Bone marrow biopsy.  rtc in 2 weeks to go over labs    2. CKD: seeing nepnrology too. Yet to moose biopsy.    3. HTN: on losartan, furosemide, coreg    4. Depression: on zoloft  5. High triglyceride: on fenofibrate  6. ED: on vardenafil.       Time Spent: 60 minutes; greater than  50% of time was spent in patient counseling and care coordination.     Jenny Woody MD    3/19/2018    11:22 AM

## 2018-03-20 ENCOUNTER — HOSPITAL ENCOUNTER (OUTPATIENT)
Dept: ULTRASOUND IMAGING | Facility: HOSPITAL | Age: 53
Discharge: HOME | End: 2018-03-20
Attending: INTERNAL MEDICINE | Admitting: INTERNAL MEDICINE

## 2018-03-20 DIAGNOSIS — I10 HYPERTENSION, UNSPECIFIED TYPE: ICD-10-CM

## 2018-03-20 DIAGNOSIS — N18.4 CHRONIC KIDNEY DISEASE, STAGE IV (SEVERE) (HCC): ICD-10-CM

## 2018-03-20 DIAGNOSIS — I10 ESSENTIAL HYPERTENSION, MALIGNANT: ICD-10-CM

## 2018-03-20 LAB
ALBUMIN SERPL-MCNC: 3.9 G/DL (ref 2.9–4.4)
ALBUMIN/GLOB SERPL: 1.2 {RATIO} (ref 0.7–1.7)
ALPHA1 GLOB FLD ELPH-MCNC: 0.2 G/DL (ref 0–0.4)
ALPHA2 GLOB SERPL ELPH-MCNC: 0.8 G/DL (ref 0.4–1)
B-GLOBULIN SERPL ELPH-MCNC: 1.3 G/DL (ref 0.7–1.3)
ETHNIC BACKGROUND STATED: 7.5 MIU/ML (ref 2.6–18.5)
GAMMA GLOB SERPL ELPH-MCNC: 1 G/DL (ref 0.4–1.8)
GLOBULIN SER CALC-MCNC: 3.3 G/DL (ref 2.2–3.9)
IGA SERPL-MCNC: 240 MG/DL (ref 90–386)
IGG SERPL-MCNC: 1005 MG/DL (ref 700–1600)
IGM SERPL-MCNC: 117 MG/DL (ref 20–172)
INTERPRETATION SERPL IEP-IMP: ABNORMAL
KAPPA LC SERPL-MCNC: 21 MG/L (ref 3.3–19.4)
KAPPA LC/LAMBDA SER: 1.43 {RATIO} (ref 0.26–1.65)
LAMBDA LC FREE SERPL-MCNC: 14.7 MG/L (ref 5.7–26.3)
Lab: ABNORMAL
M-SPIKE: ABNORMAL G/DL
PROT SERPL-MCNC: 7.2 G/DL (ref 6–8.5)

## 2018-03-20 PROCEDURE — 93975 VASCULAR STUDY: CPT

## 2018-03-20 PROCEDURE — 76775 US EXAM ABDO BACK WALL LIM: CPT

## 2018-03-20 RX ORDER — SPIRONOLACTONE 25 MG/1
TABLET ORAL
Qty: 30 TABLET | Refills: 2 | Status: SHIPPED | OUTPATIENT
Start: 2018-03-20 | End: 2018-05-16 | Stop reason: SDUPTHER

## 2018-03-21 LAB — B2 MICROGLOB SERPL-MCNC: 3.5 MG/L (ref 0.6–2.4)

## 2018-03-22 ENCOUNTER — APPOINTMENT (OUTPATIENT)
Dept: LAB | Facility: HOSPITAL | Age: 53
End: 2018-03-22
Attending: INTERNAL MEDICINE

## 2018-03-22 ENCOUNTER — HOSPITAL ENCOUNTER (OUTPATIENT)
Dept: CT IMAGING | Facility: HOSPITAL | Age: 53
Discharge: HOME | End: 2018-03-22
Attending: INTERNAL MEDICINE | Admitting: INTERNAL MEDICINE

## 2018-03-22 VITALS
DIASTOLIC BLOOD PRESSURE: 89 MMHG | HEART RATE: 73 BPM | OXYGEN SATURATION: 100 % | HEIGHT: 68 IN | RESPIRATION RATE: 14 BRPM | WEIGHT: 212 LBS | TEMPERATURE: 98 F | SYSTOLIC BLOOD PRESSURE: 136 MMHG | BODY MASS INDEX: 32.13 KG/M2

## 2018-03-22 DIAGNOSIS — D64.9 ANEMIA, UNSPECIFIED TYPE: ICD-10-CM

## 2018-03-22 LAB
APTT PPP: 31 SECONDS (ref 24.1–34.8)
BASOPHILS # BLD AUTO: 0.06 10*3/MM3 (ref 0–0.2)
BASOPHILS NFR BLD AUTO: 0.8 % (ref 0–2)
COPPER SERPL-MCNC: 94 UG/DL (ref 72–166)
DEPRECATED RDW RBC AUTO: 49.1 FL (ref 40–54)
EOSINOPHIL # BLD AUTO: 0.19 10*3/MM3 (ref 0–0.7)
EOSINOPHIL NFR BLD AUTO: 2.5 % (ref 0–4)
ERYTHROCYTE [DISTWIDTH] IN BLOOD BY AUTOMATED COUNT: 15.7 % (ref 12–15)
HCT VFR BLD AUTO: 36.3 % (ref 40–52)
HGB BLD-MCNC: 12.2 G/DL (ref 14–18)
IMM GRANULOCYTES # BLD: 0.05 10*3/MM3 (ref 0–0.03)
IMM GRANULOCYTES NFR BLD: 0.7 % (ref 0–5)
INR PPP: 0.94 (ref 0.91–1.09)
LYMPHOCYTES # BLD AUTO: 1.9 10*3/MM3 (ref 0.72–4.86)
LYMPHOCYTES NFR BLD AUTO: 24.7 % (ref 15–45)
MCH RBC QN AUTO: 29 PG (ref 28–32)
MCHC RBC AUTO-ENTMCNC: 33.6 G/DL (ref 33–36)
MCV RBC AUTO: 86.2 FL (ref 82–95)
MONOCYTES # BLD AUTO: 0.54 10*3/MM3 (ref 0.19–1.3)
MONOCYTES NFR BLD AUTO: 7 % (ref 4–12)
NEUTROPHILS # BLD AUTO: 4.95 10*3/MM3 (ref 1.87–8.4)
NEUTROPHILS NFR BLD AUTO: 64.3 % (ref 39–78)
NRBC BLD MANUAL-RTO: 0 /100 WBC (ref 0–0)
PLATELET # BLD AUTO: 330 10*3/MM3 (ref 130–400)
PMV BLD AUTO: 9.8 FL (ref 6–12)
PROTHROMBIN TIME: 12.8 SECONDS (ref 11.9–14.6)
RBC # BLD AUTO: 4.21 10*6/MM3 (ref 4.8–5.9)
WBC NRBC COR # BLD: 7.69 10*3/MM3 (ref 4.8–10.8)

## 2018-03-22 PROCEDURE — 84166 PROTEIN E-PHORESIS/URINE/CSF: CPT | Performed by: INTERNAL MEDICINE

## 2018-03-22 PROCEDURE — 88264 CHROMOSOME ANALYSIS 20-25: CPT | Performed by: INTERNAL MEDICINE

## 2018-03-22 PROCEDURE — 86335 IMMUNFIX E-PHORSIS/URINE/CSF: CPT | Performed by: INTERNAL MEDICINE

## 2018-03-22 PROCEDURE — 88313 SPECIAL STAINS GROUP 2: CPT | Performed by: INTERNAL MEDICINE

## 2018-03-22 PROCEDURE — 25010000002 MIDAZOLAM PER 1 MG: Performed by: RADIOLOGY

## 2018-03-22 PROCEDURE — 85610 PROTHROMBIN TIME: CPT | Performed by: INTERNAL MEDICINE

## 2018-03-22 PROCEDURE — 88280 CHROMOSOME KARYOTYPE STUDY: CPT | Performed by: INTERNAL MEDICINE

## 2018-03-22 PROCEDURE — 88237 TISSUE CULTURE BONE MARROW: CPT | Performed by: INTERNAL MEDICINE

## 2018-03-22 PROCEDURE — 84156 ASSAY OF PROTEIN URINE: CPT | Performed by: INTERNAL MEDICINE

## 2018-03-22 PROCEDURE — 77012 CT SCAN FOR NEEDLE BIOPSY: CPT

## 2018-03-22 PROCEDURE — 88184 FLOWCYTOMETRY/ TC 1 MARKER: CPT | Performed by: INTERNAL MEDICINE

## 2018-03-22 PROCEDURE — 25010000002 FENTANYL CITRATE (PF) 100 MCG/2ML SOLUTION: Performed by: RADIOLOGY

## 2018-03-22 PROCEDURE — 88311 DECALCIFY TISSUE: CPT | Performed by: INTERNAL MEDICINE

## 2018-03-22 PROCEDURE — 88185 FLOWCYTOMETRY/TC ADD-ON: CPT | Performed by: INTERNAL MEDICINE

## 2018-03-22 PROCEDURE — 85025 COMPLETE CBC W/AUTO DIFF WBC: CPT | Performed by: INTERNAL MEDICINE

## 2018-03-22 PROCEDURE — 85730 THROMBOPLASTIN TIME PARTIAL: CPT | Performed by: INTERNAL MEDICINE

## 2018-03-22 PROCEDURE — 81050 URINALYSIS VOLUME MEASURE: CPT | Performed by: INTERNAL MEDICINE

## 2018-03-22 PROCEDURE — 88305 TISSUE EXAM BY PATHOLOGIST: CPT | Performed by: INTERNAL MEDICINE

## 2018-03-22 RX ORDER — MIDAZOLAM HYDROCHLORIDE 1 MG/ML
INJECTION INTRAMUSCULAR; INTRAVENOUS
Status: COMPLETED | OUTPATIENT
Start: 2018-03-22 | End: 2018-03-22

## 2018-03-22 RX ORDER — SODIUM CHLORIDE 0.9 % (FLUSH) 0.9 %
1-10 SYRINGE (ML) INJECTION AS NEEDED
Status: CANCELLED | OUTPATIENT
Start: 2018-03-22

## 2018-03-22 RX ORDER — FENTANYL CITRATE 50 UG/ML
INJECTION, SOLUTION INTRAMUSCULAR; INTRAVENOUS
Status: COMPLETED | OUTPATIENT
Start: 2018-03-22 | End: 2018-03-22

## 2018-03-22 RX ORDER — LIDOCAINE HYDROCHLORIDE 10 MG/ML
INJECTION, SOLUTION INFILTRATION; PERINEURAL
Status: COMPLETED | OUTPATIENT
Start: 2018-03-22 | End: 2018-03-22

## 2018-03-22 RX ADMIN — FENTANYL CITRATE 25 MCG: 50 INJECTION, SOLUTION INTRAMUSCULAR; INTRAVENOUS at 12:43

## 2018-03-22 RX ADMIN — MIDAZOLAM 1 MG: 1 INJECTION INTRAMUSCULAR; INTRAVENOUS at 12:43

## 2018-03-22 RX ADMIN — LIDOCAINE HYDROCHLORIDE 16 ML: 10 INJECTION, SOLUTION INFILTRATION; PERINEURAL at 12:45

## 2018-03-22 RX ADMIN — FENTANYL CITRATE 25 MCG: 50 INJECTION, SOLUTION INTRAMUSCULAR; INTRAVENOUS at 12:50

## 2018-03-22 NOTE — NURSING NOTE
L hip site clean, dry and intact. Soft, no bleeding or hematoma. Bandaid over site, report called to [facility] in COU.

## 2018-03-22 NOTE — PRE-PROCEDURE NOTE
Risks and benefits were explained. H & P reviewed with no changes. Questionswere  answered. Pt gave consent verbally.

## 2018-03-23 LAB
ALBUMIN 24H MFR UR ELPH: 12.4 %
ALPHA1 GLOB 24H MFR UR ELPH: 6.5 %
ALPHA2 GLOB 24H MFR UR ELPH: 20 %
B-GLOBULIN MFR UR ELPH: 21.4 %
GAMMA GLOB 24H MFR UR ELPH: 39.7 %
HIV 1 & 2 AB SER-IMP: NORMAL
INTERPRETATION UR IFE-IMP: NORMAL
M PROTEIN 24H MFR UR ELPH: NORMAL %
PROT 24H UR-MRATE: 114 MG/24 HR (ref 30–150)
PROT UR-MCNC: 4.4 MG/DL

## 2018-03-27 LAB
CYTO UR: NORMAL
LAB AP CASE REPORT: NORMAL
Lab: NORMAL
PATH REPORT.FINAL DX SPEC: NORMAL
PATH REPORT.GROSS SPEC: NORMAL

## 2018-04-09 ENCOUNTER — OFFICE VISIT (OUTPATIENT)
Dept: ONCOLOGY | Facility: CLINIC | Age: 53
End: 2018-04-09

## 2018-04-09 VITALS
WEIGHT: 214.4 LBS | HEIGHT: 68 IN | RESPIRATION RATE: 16 BRPM | OXYGEN SATURATION: 96 % | BODY MASS INDEX: 32.49 KG/M2 | DIASTOLIC BLOOD PRESSURE: 78 MMHG | SYSTOLIC BLOOD PRESSURE: 126 MMHG | HEART RATE: 76 BPM | TEMPERATURE: 97.7 F

## 2018-04-09 DIAGNOSIS — D50.8 OTHER IRON DEFICIENCY ANEMIA: Primary | ICD-10-CM

## 2018-04-09 DIAGNOSIS — D63.1 ANEMIA IN CHRONIC KIDNEY DISEASE, UNSPECIFIED CKD STAGE: Primary | ICD-10-CM

## 2018-04-09 DIAGNOSIS — N18.9 ANEMIA IN CHRONIC KIDNEY DISEASE, UNSPECIFIED CKD STAGE: Primary | ICD-10-CM

## 2018-04-09 PROCEDURE — 99214 OFFICE O/P EST MOD 30 MIN: CPT | Performed by: INTERNAL MEDICINE

## 2018-04-09 NOTE — PROGRESS NOTES
[facility]  HEMATOLOGY & ONCOLOGY    Cancer Staging Information:  No matching staging information was found for the patient.      Subjective     VISIT DIAGNOSIS:   Encounter Diagnosis   Name Primary?   • Anemia in chronic kidney disease, unspecified CKD stage Yes       REASON FOR VISIT:     Chief Complaint   Patient presents with   • Anemia     f/u        HEMATOLOGY / ONCOLOGY HISTORY:    No history exists.           INTERVAL HISTORY  Patient ID: Lukasz Savage is a 53 y.o. year old male No matching staging information was found for the patient.    Past Medical History:   Past Medical History:   Diagnosis Date   • Angio-edema 7/3/2017   • Anxiety    • Arthritis    • Clostridium difficile colitis    • Erectile dysfunction 10/6/2016   • Gout    • HCAP (healthcare-associated pneumonia) 7/11/2017   • Hyperlipidemia    • Malignant hypertension    • MSSA (methicillin susceptible Staphylococcus aureus) infection 7/31/2017   • Obstructive sleep apnea    • Seizures 7/4/2017     Past Surgical History:   Past Surgical History:   Procedure Laterality Date   • TRACHEOSTOMY N/A 7/12/2017    Procedure: TRACHEOSTOMY WITH TRACHEOSCOPY;  Surgeon: Jairon Pierson MD;  Location: [facility];  Service:      Social History:   Social History     Social History   • Marital status: Single     Spouse name: N/A   • Number of children: N/A   • Years of education: N/A     Occupational History   • Not on file.     Social History Main Topics   • Smoking status: Former Smoker     Packs/day: 0.33     Years: 30.00     Types: Cigarettes     Quit date: 7/3/2017   • Smokeless tobacco: Never Used   • Alcohol use No      Comment: PER DAY FOR 30 YEARS; Last drink July 3, 2017   • Drug use: No   • Sexual activity: Defer     Other Topics Concern   • Not on file     Social History Narrative   • No narrative on file     Family History:   Family History   Problem Relation Age of Onset   • Hypertension Mother    • Diabetes Mother    • Heart  disease Father    • Hypertension Father    • No Known Problems Daughter    • No Known Problems Son    • Diabetes Maternal Grandmother    • No Known Problems Maternal Grandfather    • No Known Problems Paternal Grandmother    • Heart attack Paternal Grandfather    • Kidney disease Sister        Review of Systems   Constitutional: Positive for fatigue. Negative for appetite change.   HENT: Negative.    Eyes: Negative.    Respiratory: Negative.    Cardiovascular: Negative.    Gastrointestinal: Negative.    Genitourinary: Negative.    Musculoskeletal: Negative.    Skin: Negative.    Neurological: Negative.    Hematological: Negative.    Psychiatric/Behavioral:        Pt believes he is depressed. He was started on zoloft last week        Performance Status:  Asymptomatic    Medications:    Current Outpatient Prescriptions   Medication Sig Dispense Refill   • carvedilol (COREG) 25 MG tablet Take 1 tablet by mouth Every 12 (Twelve) Hours. For high blood pressure 180 tablet 1   • CloNIDine (CATAPRES) 0.1 MG tablet Take 1 tablet by mouth Every Night. Check BP 4x daily. If> 180 SBP take x1. 60 tablet 2   • colchicine 0.6 MG tablet Take 2 tabs now, repeat 1 tab in an hour.  May repeat same steps again in 24 hours if needed.  He has chronic gout 30 tablet 2   • fenofibrate (TRICOR) 145 MG tablet TAKE ONE TABLET BY MOUTH DAILY 90 tablet 0   • losartan (COZAAR) 100 MG tablet Take 1 tablet by mouth Daily. For high blood pressure 90 tablet 1   • Omega-3 Fatty Acids (FISH OIL) 1000 MG capsule capsule Take 2 capsules by mouth Daily With Breakfast. 180 capsule 1   • omeprazole (PRILOSEC) 10 MG capsule Take 1 capsule by mouth Daily. For acid reflux 90 capsule 1   • sertraline (ZOLOFT) 50 MG tablet Take 0.5 of tab nightly  X 7 days then increase 1 tab nightly 60 tablet 0   • spironolactone (ALDACTONE) 25 MG tablet TAKE ONE TABLET BY MOUTH DAILY 30 tablet 2   • vardenafil (LEVITRA) 10 MG tablet Take 1 tablet by mouth Daily As Needed for  "erectile dysfunction. 10 tablet 5     No current facility-administered medications for this visit.        ALLERGIES:    Allergies   Allergen Reactions   • Lipitor [Atorvastatin] Rash   • Lisinopril Angioedema       Objective      Vitals:    04/09/18 1443   BP: 126/78   Pulse: 76   Resp: 16   Temp: 97.7 °F (36.5 °C)   TempSrc: Temporal Artery    SpO2: 96%   Weight: 97.3 kg (214 lb 6.4 oz)   Height: 172.7 cm (67.99\")         Current Status 4/9/2018   ECOG score 0         Physical Exam    RECENT LABS:  No visits with results within 7 Day(s) from this visit.   Latest known visit with results is:   Hospital Outpatient Visit on 03/22/2018   Component Date Value Ref Range Status   • PTT 03/22/2018 31.0  24.1 - 34.8 seconds Final   • Protime 03/22/2018 12.8  11.9 - 14.6 Seconds Final   • INR 03/22/2018 0.94  0.91 - 1.09 Final   • WBC 03/22/2018 7.69  4.80 - 10.80 10*3/mm3 Final   • RBC 03/22/2018 4.21* 4.80 - 5.90 10*6/mm3 Final   • Hemoglobin 03/22/2018 12.2* 14.0 - 18.0 g/dL Final   • Hematocrit 03/22/2018 36.3* 40.0 - 52.0 % Final   • MCV 03/22/2018 86.2  82.0 - 95.0 fL Final   • MCH 03/22/2018 29.0  28.0 - 32.0 pg Final   • MCHC 03/22/2018 33.6  33.0 - 36.0 g/dL Final   • RDW 03/22/2018 15.7* 12.0 - 15.0 % Final   • RDW-SD 03/22/2018 49.1  40.0 - 54.0 fl Final   • MPV 03/22/2018 9.8  6.0 - 12.0 fL Final   • Platelets 03/22/2018 330  130 - 400 10*3/mm3 Final   • Neutrophil % 03/22/2018 64.3  39.0 - 78.0 % Final   • Lymphocyte % 03/22/2018 24.7  15.0 - 45.0 % Final   • Monocyte % 03/22/2018 7.0  4.0 - 12.0 % Final   • Eosinophil % 03/22/2018 2.5  0.0 - 4.0 % Final   • Basophil % 03/22/2018 0.8  0.0 - 2.0 % Final   • Immature Grans % 03/22/2018 0.7  0.0 - 5.0 % Final   • Neutrophils, Absolute 03/22/2018 4.95  1.87 - 8.40 10*3/mm3 Final   • Lymphocytes, Absolute 03/22/2018 1.90  0.72 - 4.86 10*3/mm3 Final   • Monocytes, Absolute 03/22/2018 0.54  0.19 - 1.30 10*3/mm3 Final   • Eosinophils, Absolute 03/22/2018 0.19  0.00 - " 0.70 10*3/mm3 Final   • Basophils, Absolute 03/22/2018 0.06  0.00 - 0.20 10*3/mm3 Final   • Immature Grans, Absolute 03/22/2018 0.05* 0.00 - 0.03 10*3/mm3 Final   • nRBC 03/22/2018 0.0  0.0 - 0.0 /100 WBC Final   • Case Report 03/27/2018    Final                    Value:Surgical Pathology Report                         Case: [id number]                                  Authorizing Provider:  Jenny Woody, Collected:           03/22/2018 12:00 PM                                 MD                                                                           Ordering Location:     [facility] CT  Received:            03/22/2018 02:09 PM          Pathologist:           Bessy Brice MD                                                          Specimens:   1) - Iliac Crest, Left - Aspirate, Bone Marrow, Aspirate/Clot                                       2) - Iliac Crest, Left - Biopsy, Bone Marrow, Biopsy                                      • Final Diagnosis 03/27/2018    Final                    Value:This result contains rich text formatting which cannot be displayed here.   • Gross Description 03/27/2018    Final                    Value:This result contains rich text formatting which cannot be displayed here.   • Microscopic Description 03/27/2018    Final                    Value:This result contains rich text formatting which cannot be displayed here.       RADIOLOGY:  Us Renal Artery Complete    Result Date: 3/20/2018  Narrative: EXAMINATION:  US RENAL ARTERY COMPLETE-  3/20/2018 10:23 AM CDT  HISTORY: N18.4, I10; N18.4-Chronic kidney disease, stage 4 (severe); I10-Essential (primary) hypertension  COMPARISON: 7/12/2017  TECHNIQUE: Bilateral renal ultrasound examination was performed. Ultrasound evaluation of the renal arteries was performed using spectral analysis/duplex Doppler and color flow technique.  FINDINGS:  The left kidney measures 10.76 cm in dnvs-we-kryn length.  The right  kidney measures 10.1 cm in bhwj-ly-wplr length.  Normal echogenicity of the renal cortex is observed without renal masses.  There is no pelvocaliectasis to indicate obstruction. There are no perinephric abnormalities.  The urinary bladder is mildly distended without focal bladder wall abnormalities. The prostate gland is minimally prominent measuring 4 cm transverse projection  Evaluation of the renal arteries demonstrate no significantly increased flow velocities to indicate renal artery stenosis.  The right proximal renal artery flow velocity measures 128 cm/s in the left proximal renal artery measures 145 cm/s.  Normal resistive indices appreciated.  Please see the ultrasonographer's renal artery worksheet for more flow velocity detail.  CT angiography of the renal arteries may be considered if further evaluation warranted.      Impression:  1. Negative bilateral renal ultrasound. 2. No ultrasound evidence of hemodynamically significant steno-occlusive disease. This report was finalized on 03/20/2018 13:02 by Dr. Pierre Pan MD.    Us Renal Bilateral    Result Date: 3/20/2018  Narrative: EXAMINATION:  US RENAL ARTERY COMPLETE-  3/20/2018 10:23 AM CDT  HISTORY: N18.4, I10; N18.4-Chronic kidney disease, stage 4 (severe); I10-Essential (primary) hypertension  COMPARISON: 7/12/2017  TECHNIQUE: Bilateral renal ultrasound examination was performed. Ultrasound evaluation of the renal arteries was performed using spectral analysis/duplex Doppler and color flow technique.  FINDINGS:  The left kidney measures 10.76 cm in keja-si-oiyi length.  The right kidney measures 10.1 cm in xshr-dp-vcet length.  Normal echogenicity of the renal cortex is observed without renal masses.  There is no pelvocaliectasis to indicate obstruction. There are no perinephric abnormalities.  The urinary bladder is mildly distended without focal bladder wall abnormalities. The prostate gland is minimally prominent measuring 4 cm transverse  projection  Evaluation of the renal arteries demonstrate no significantly increased flow velocities to indicate renal artery stenosis.  The right proximal renal artery flow velocity measures 128 cm/s in the left proximal renal artery measures 145 cm/s.  Normal resistive indices appreciated.  Please see the ultrasonographer's renal artery worksheet for more flow velocity detail.  CT angiography of the renal arteries may be considered if further evaluation warranted.      Impression:  1. Negative bilateral renal ultrasound. 2. No ultrasound evidence of hemodynamically significant steno-occlusive disease. This report was finalized on 03/20/2018 13:02 by Dr. Pierre Pan MD.    Ct Guided Biopsy Bone Marrow    Result Date: 3/22/2018  Narrative: EXAMINATION:  CT GUIDED BIOPSY BONE MARROW-  3/22/2018 12:20 PM CDT  HISTORY: D64.9-Anemia, unspecified.  COMPARISON: No comparison study.  CONSENT: The CT-guided bone marrow biopsy procedure as well as potential complications, such as bleeding and infection, were discussed with the patient. Questions were answered. The patient agreed to the procedure.  PROCEDURE: Initial localization CT images were obtained. The skin was marked. Sterile gloves were utilized. The skin was prepped with ChloraPrep and sterilely draped. The patient was given IV Versed and fentanyl during the procedure. Nursing was present for the redemonstration and monitoring. 1% lidocaine was infiltrated into the soft tissues for local anesthesia. The bone marrow biopsy needle was advanced to the surface of the posterior left ilium. Images were obtained demonstrating the needle in good position. The drill was then attached and the bone marrow needle was advanced into the bone marrow. 10 cc of bone marrow was then aspirated. The drill was reconnected to the bone and a core sample of the bone was obtained with a drill. The needle was removed from the patient. Hemostasis was obtained. The patient tolerated the  procedure well without complication. He left the department in good condition.      Impression: CT guidance was utilized for bone marrow biopsy and aspiration, as described above. There were no immediate complications. This report was finalized on 03/22/2018 13:55 by Dr. Cooper Prince MD.           Assessment/Plan  Lukasz Savage is a 53 y.o. year old male     Patient Active Problem List   Diagnosis   • Hyperlipidemia   • HTN (hypertension)   • Gout   • Anxiety   • Arthritis   • Erectile dysfunction   • Angio-edema   • Seizures   • HCAP (healthcare-associated pneumonia)   • MSSA (methicillin susceptible Staphylococcus aureus) infection   • Foot deformity   • Chronic gout of right foot   • Peripheral edema          1. Anemia: iron studies low normal. Bone marrow bx 2+ iron. Folate and B12 nl. Myeloma panel negative. BM bx no evidence of malignancy. Suspect anemia of CKD because erythropoietin is 7. This is low for an anemic patient. Will follow. If Hg less than 10 with adequate iron store will give procrit.    2. CKD: seeing nepnrology too. Yet to get a biopsy.     3. HTN: on losartan, furosemide, coreg     4. Depression: on zoloft  5. High triglyceride: on fenofibrate  6. ED: on vardenafil.          Jenny Woody MD    4/9/2018    3:30 PM

## 2018-05-08 DIAGNOSIS — I10 ESSENTIAL HYPERTENSION: ICD-10-CM

## 2018-05-10 RX ORDER — CARVEDILOL 25 MG/1
TABLET ORAL
Qty: 180 TABLET | Refills: 0 | Status: SHIPPED | OUTPATIENT
Start: 2018-05-10 | End: 2018-05-16 | Stop reason: SDUPTHER

## 2018-05-11 DIAGNOSIS — R89.9 ABNORMAL LABORATORY TEST: ICD-10-CM

## 2018-05-11 DIAGNOSIS — F33.1 MODERATE EPISODE OF RECURRENT MAJOR DEPRESSIVE DISORDER (HCC): ICD-10-CM

## 2018-05-11 DIAGNOSIS — I15.9 SECONDARY HYPERTENSION: ICD-10-CM

## 2018-05-11 DIAGNOSIS — K21.9 GASTROESOPHAGEAL REFLUX DISEASE WITHOUT ESOPHAGITIS: ICD-10-CM

## 2018-05-11 RX ORDER — LOSARTAN POTASSIUM 100 MG/1
TABLET ORAL
Qty: 90 TABLET | Refills: 0 | OUTPATIENT
Start: 2018-05-11

## 2018-05-11 RX ORDER — FENOFIBRATE 145 MG/1
TABLET, COATED ORAL
Qty: 90 TABLET | Refills: 0 | OUTPATIENT
Start: 2018-05-11

## 2018-05-11 RX ORDER — OMEPRAZOLE 10 MG/1
CAPSULE, DELAYED RELEASE ORAL
Qty: 90 CAPSULE | Refills: 0 | OUTPATIENT
Start: 2018-05-11

## 2018-05-11 RX ORDER — POTASSIUM CHLORIDE 750 MG/1
TABLET, FILM COATED, EXTENDED RELEASE ORAL
Qty: 90 TABLET | Refills: 0 | OUTPATIENT
Start: 2018-05-11

## 2018-05-11 RX ORDER — FUROSEMIDE 20 MG/1
TABLET ORAL
Qty: 90 TABLET | Refills: 0 | OUTPATIENT
Start: 2018-05-11

## 2018-05-16 ENCOUNTER — OFFICE VISIT (OUTPATIENT)
Dept: FAMILY MEDICINE CLINIC | Facility: CLINIC | Age: 53
End: 2018-05-16

## 2018-05-16 VITALS
DIASTOLIC BLOOD PRESSURE: 72 MMHG | HEIGHT: 68 IN | HEART RATE: 78 BPM | RESPIRATION RATE: 18 BRPM | SYSTOLIC BLOOD PRESSURE: 126 MMHG | BODY MASS INDEX: 31.07 KG/M2 | OXYGEN SATURATION: 97 % | TEMPERATURE: 97.8 F | WEIGHT: 205 LBS

## 2018-05-16 DIAGNOSIS — M1A.09X0 CHRONIC GOUT OF MULTIPLE SITES, UNSPECIFIED CAUSE: ICD-10-CM

## 2018-05-16 DIAGNOSIS — F33.1 MODERATE EPISODE OF RECURRENT MAJOR DEPRESSIVE DISORDER (HCC): ICD-10-CM

## 2018-05-16 DIAGNOSIS — R45.86 MOOD SWING: Primary | ICD-10-CM

## 2018-05-16 DIAGNOSIS — I10 ESSENTIAL HYPERTENSION: ICD-10-CM

## 2018-05-16 DIAGNOSIS — R89.9 ABNORMAL LABORATORY TEST: ICD-10-CM

## 2018-05-16 DIAGNOSIS — E55.9 VITAMIN D DEFICIENCY: ICD-10-CM

## 2018-05-16 DIAGNOSIS — E78.2 MIXED HYPERLIPIDEMIA: ICD-10-CM

## 2018-05-16 DIAGNOSIS — N52.9 ERECTILE DYSFUNCTION, UNSPECIFIED ERECTILE DYSFUNCTION TYPE: ICD-10-CM

## 2018-05-16 DIAGNOSIS — I10 HYPERTENSION, UNSPECIFIED TYPE: ICD-10-CM

## 2018-05-16 DIAGNOSIS — I15.9 SECONDARY HYPERTENSION: ICD-10-CM

## 2018-05-16 DIAGNOSIS — K21.9 GASTROESOPHAGEAL REFLUX DISEASE WITHOUT ESOPHAGITIS: ICD-10-CM

## 2018-05-16 PROCEDURE — 99214 OFFICE O/P EST MOD 30 MIN: CPT | Performed by: NURSE PRACTITIONER

## 2018-05-16 RX ORDER — VARDENAFIL HYDROCHLORIDE 10 MG/1
10 TABLET ORAL DAILY PRN
Qty: 10 TABLET | Refills: 5 | Status: SHIPPED | OUTPATIENT
Start: 2018-05-16 | End: 2018-11-20 | Stop reason: SDUPTHER

## 2018-05-16 RX ORDER — COLCHICINE 0.6 MG/1
TABLET ORAL
Qty: 30 TABLET | Refills: 2 | Status: SHIPPED | OUTPATIENT
Start: 2018-05-16 | End: 2018-07-27 | Stop reason: SDUPTHER

## 2018-05-16 RX ORDER — SPIRONOLACTONE 25 MG/1
25 TABLET ORAL DAILY
Qty: 90 TABLET | Refills: 1 | Status: SHIPPED | OUTPATIENT
Start: 2018-05-16 | End: 2018-11-20 | Stop reason: SDUPTHER

## 2018-05-16 RX ORDER — OMEPRAZOLE 10 MG/1
10 CAPSULE, DELAYED RELEASE ORAL DAILY
Qty: 90 CAPSULE | Refills: 1 | Status: SHIPPED | OUTPATIENT
Start: 2018-05-16 | End: 2018-11-20 | Stop reason: SDUPTHER

## 2018-05-16 RX ORDER — CLONIDINE HYDROCHLORIDE 0.1 MG/1
0.1 TABLET ORAL NIGHTLY
Qty: 60 TABLET | Refills: 2 | Status: SHIPPED | OUTPATIENT
Start: 2018-05-16 | End: 2018-11-20 | Stop reason: SDUPTHER

## 2018-05-16 RX ORDER — QUETIAPINE FUMARATE 100 MG/1
100 TABLET, FILM COATED ORAL NIGHTLY
Qty: 90 TABLET | Refills: 1 | Status: SHIPPED | OUTPATIENT
Start: 2018-05-16 | End: 2018-06-04

## 2018-05-16 RX ORDER — CARVEDILOL 25 MG/1
25 TABLET ORAL 2 TIMES DAILY WITH MEALS
Qty: 180 TABLET | Refills: 1 | Status: SHIPPED | OUTPATIENT
Start: 2018-05-16 | End: 2018-07-27 | Stop reason: SDUPTHER

## 2018-05-16 RX ORDER — FENOFIBRATE 145 MG/1
145 TABLET, COATED ORAL DAILY
Qty: 90 TABLET | Refills: 1 | Status: SHIPPED | OUTPATIENT
Start: 2018-05-16 | End: 2018-11-20

## 2018-05-16 RX ORDER — CHLORAL HYDRATE 500 MG
2000 CAPSULE ORAL
Qty: 180 CAPSULE | Refills: 1 | Status: SHIPPED | OUTPATIENT
Start: 2018-05-16 | End: 2018-11-20 | Stop reason: SDUPTHER

## 2018-05-16 RX ORDER — LOSARTAN POTASSIUM 100 MG/1
100 TABLET ORAL
Qty: 90 TABLET | Refills: 1 | Status: SHIPPED | OUTPATIENT
Start: 2018-05-16 | End: 2018-11-20 | Stop reason: SDUPTHER

## 2018-05-16 NOTE — PROGRESS NOTES
CC:  HTN, hyperlipidemia, depression.       HPI  Lukasz Savage is a 53 y.o. male presents today for refills, labs, and follow up for HTN, hyperlipidemia, Depression, Gout, ED and Obstructive sleep apnea.  Denies any pain today.  Upset because he went to see rhematologist in [place] and says he spent all of about 2 minutes with him and all he told pt was take tylenol.      Chronic problems:  HTN stable with carvediolol, clonidine, spironlactone and losartan.  Hyperlipidemia stable with fenofibrate and omega 3 fatty acids,  GERD stable with omeprazole, ED stable with vardenafi,  Gout multiple joints stable with colchicine, depression/anxiety stable with quetiapine, arthritis/RA stable with otc tylenol, and sleep apnea stable with CPAP.     PCP:  iLnda Hoffman, DNP, APRN    Allergies   Allergen Reactions   • Lipitor [Atorvastatin] Rash   • Lisinopril Angioedema       Past Medical History:   Diagnosis Date   • Angio-edema 7/3/2017   • Anxiety    • Arthritis    • Clostridium difficile colitis    • Erectile dysfunction 10/6/2016   • Gout    • HCAP (healthcare-associated pneumonia) 7/11/2017   • Hyperlipidemia    • Malignant hypertension    • MSSA (methicillin susceptible Staphylococcus aureus) infection 7/31/2017   • Obstructive sleep apnea    • Seizures 7/4/2017       Past Surgical History:   Procedure Laterality Date   • TRACHEOSTOMY N/A 7/12/2017    Procedure: TRACHEOSTOMY WITH TRACHEOSCOPY;  Surgeon: Jairon Pierson MD;  Location: [facility];  Service:        Social History     Social History   • Marital status: Single     Social History Main Topics   • Smoking status: Former Smoker     Packs/day: 0.33     Years: 30.00     Types: Cigarettes     Quit date: 7/3/2017   • Smokeless tobacco: Never Used   • Alcohol use No      Comment: PER DAY FOR 30 YEARS; Last drink July 3, 2017   • Drug use: No   • Sexual activity: Defer     Other Topics Concern   • Not on file       Family History   Problem Relation Age  of Onset   • Hypertension Mother    • Diabetes Mother    • Heart disease Father    • Hypertension Father    • No Known Problems Daughter    • No Known Problems Son    • Diabetes Maternal Grandmother    • No Known Problems Maternal Grandfather    • No Known Problems Paternal Grandmother    • Heart attack Paternal Grandfather    • Kidney disease Sister        Current Outpatient Prescriptions on File Prior to Visit   Medication Sig Dispense Refill   • carvedilol (COREG) 25 MG tablet TAKE ONE TABLET BY MOUTH EVERY 12 HOURS FOR HIGH BLOOD PRESSURE 180 tablet 0   • CloNIDine (CATAPRES) 0.1 MG tablet Take 1 tablet by mouth Every Night. Check BP 4x daily. If> 180 SBP take x1. 60 tablet 2   • colchicine 0.6 MG tablet Take 2 tabs now, repeat 1 tab in an hour.  May repeat same steps again in 24 hours if needed.  He has chronic gout 30 tablet 2   • fenofibrate (TRICOR) 145 MG tablet TAKE ONE TABLET BY MOUTH DAILY 90 tablet 0   • losartan (COZAAR) 100 MG tablet Take 1 tablet by mouth Daily. For high blood pressure 90 tablet 1   • Omega-3 Fatty Acids (FISH OIL) 1000 MG capsule capsule Take 2 capsules by mouth Daily With Breakfast. 180 capsule 1   • omeprazole (PRILOSEC) 10 MG capsule Take 1 capsule by mouth Daily. For acid reflux 90 capsule 1   • spironolactone (ALDACTONE) 25 MG tablet TAKE ONE TABLET BY MOUTH DAILY 30 tablet 2   • vardenafil (LEVITRA) 10 MG tablet Take 1 tablet by mouth Daily As Needed for erectile dysfunction. 10 tablet 5   • [DISCONTINUED] sertraline (ZOLOFT) 50 MG tablet Take 0.5 of tab nightly  X 7 days then increase 1 tab nightly 60 tablet 0     No current facility-administered medications on file prior to visit.         REVIEW OF SYMPTOMS: (Positives bolded)  General:  weight loss, fever, chills, night sweats, fatigue, appetite loss  HEENT:  blurry vision, eye pain, eye discharge, dry eyes, decreased vision  Respiratory: shortness of breath, cough, hemoptysis, wheezing, pleurisy,   Cardiovascular:  chest  "pain, PND, palpitation, edema, orthopnea, syncope, swelling of extremities  Gastro: Nausea, vomiting, diarrhea, hematemesis, abdominal pain, constipation  Genito: hematuria, dysuria, glycosuria, hesitancy, frequency, incontinence  Musckelo: Arthralgia, myalgia, muscle weakness, joint swelling, NSAID use  Skin: rash, pruritis, sores, nail changes, skin thickening, change in wart/mole, itching, rash, new lesions, pruritus, nail changes  Neuro:  Migraine, numbness, ataxia, tremor, vertigo, weakness, memory loss  \"All other systems reviewed and negative, except as listed above.”      OBJECTIVE:  Constitutional:  Appearance-No acute distress, Consistent with stated age. Orientation- Oriented x 3, alert Posture-Not doubled over. Gait-Normal pace, normal arm movement. Posture- Normal Build and Nutrition-Well developed and well nourished.  General- Patient is pleasant and cooperative with the interview and exam.    Integumentary: General-No rashes, ulcers or lesions. No edema.  Palpation- Normal skin moisture/turgor. Skin is warm to touch, no increased warmth. Capillary refill is normal bilateral Upper and lower extremity.     Head/Neck: Head- normocephalic and atraumatic.  Neck- without visible/palpable lumps or pulsations.  Palpation- No bony tenderness about head/neck along frontal, occiptial, temporal, parietal, mastoid, jawline, zygoma, orbit or any other location.  NO temporal artery tenderness. No TMJ tenderness. Normal cervical ROM.   Neck Supple.  Thyroid-No thyromegaly, no nodules    Eye: Bilaterally PERRLA, EOMI.  No discharge.  Upper and lower eyelids are normal. Sclera/conjunctiva normal without discharge. Cornea is normal and clear. Lens is normal.  Eyeball appears normal. No ciliary flushing, no conjunctival injection.      CHEST/LUNG: Inspection- symmetric chest wall no pectus deformity. Normal effort, no distress, no use of accessory muscles. Palpation- nontender sternum, ribline.  No abnormal pulsations. " Auscultation- Breath sounds normal throughout all lung fields.  Normal tracheal sounds, Normal bronchial sounds overlying sternum, Bronchovessicular sounds normal between scapulae posteriorly, Normal vessicular breath sounds heard throughout periphery. Lungs are clear today. Adventitious sounds- No wheezes, rales, rhonchi.     CARDIOVASCULAR:  Carotid artery- normal, no bruits or abnormal pulsations. Jugular vein- no pulsations. Palpation/Percussion- Normal PMI, no palpable thrill  Auscultation- Regular rate and rhythm. No murmur noted in sitting, supine positions. Extremities- no digital clubbing, cyanosis, edema, increased warmth.    ABDOMEN: Inspection- normal and no visible pulsations. Normal contour. Auscultation- Bowel sounds normal, no abdominal bruits. Palpation/Percussion- soft, non-tender, no rebound tenderness, no rigidity (guarding), no jar tenderness, no masses.  Liver-no hepatomegaly, Spleen - no splenomegaly, Hernias- none. Rectal not examined.     Peripheral Vascular: Upper extremity Left- Normal temperature with pink nailbeds and no ulcerations.  Upper extremity Right- Normal temperature with pink nailbeds and no ulcerations.  Lower extremity- Normal temperature with pink nailbeds and no ulcerations. DP pulses 2+ bilaterally.  Pedal hair intact.  Normal capillary refill. Edema- No edema.    Musculoskeletal: Generalized-No generalized swelling or edema of extremities, no digital clubbing or cyanosis, neurovascularly intact all four extremities.  Upper extremity- Symmetrical posture.  No visible deformity.  Normal sensation along medial and lateral upper extremity proximally and distally.  NO tenderness overlying shoulder, lateral/medial epicondyle.  5/5 and strength 5/5 bilateral UE.  Elbow palpated, no tenderness overlying olecranon.  Normal supination, pronation to active/passive ROM and to resisted rotation. Bicep insertion/tricep insertion appear normal without obvious pathology. Rotator cuff  evaluated and intact.  Normal wrist ROM bilaterally. Normal hand movement, intrinsic muscles of hands normal. No tenderness to palpation of hands/wrists/elbows.  Lower extremity- Not tender to palpation, no pain, no swelling, edema or erythema of surrounding tissue, normal strength and tone.  Normal appearing hip ROM bilaterally without pain.  Knee ROM normal at 0-120 degrees. No tenderness overlying trochanters, no tenderness about patella, quad tendon, patellar tendon.  No tenderness at tibial tuberosity. Ankle normal ROM not tender to palpation along medial/lateral malleolus. Normal movement of toes, no tenderness bilateral feet/toes.  Normal foot type. Calves symmetrical.  Stretching demonstrated today.  Spine/Ribs- No deformities, masses or tenderness, no known fractures, normal strength, Normal ROM. Normal stability No tenderness along C/T/L spine.  Normal appearing ROM about spine.      Neurological: General- Moves all 4 extremities symmetrically. Symmetrical face and body posture. Cranial nerves- individually evaluated II-XII and intact. PERRLA, Normal EOMI, visual/special senses appear intact, Face is symmetrical and normal sensation/movement, normal tongue, normal strength/posture of neck musculature. Balance- Romberg intact.  Reflexes- ntact with DTR 2+ patellar, Achilles, bicep, brachial,tricep. Ankle clonus normal with 2 beats.  Strength- 5/5 bilateral UE and LE. Soft touch- intact bilateral UE and LE.  Temperature sensation- intact bilateral UE and LE. Cerebellar testing-Rapid alternating movements intact.  Heel shin intact. Able to walk normal gait, normal heel toe walking. Neck- supple.      Neuropsych: Oriented- Person, place, time. (AAOx3), Mood/affect- normal and congruent. Able to articulate well. Speech-Normal speech, normal rate, normal tone, normal use of language, volume and coherence.  Thought content- normal with ability to perform basic computations and apply abstract thought/reason.  Associations- intact, no SI/HI, no hallucinations, delusions, obsessions.  Judgment/insight- Appropriate. Memory-Recall intact, remote and recent memory intact. Knowledge- Age appropriate fund of knowledge, concentration and attention span normal.    Lymphatic: Head/Neck- normal size and non tender to palpation. Axillary- Head and neck LN are normal size and non tender to palpation. Femoral and Inguinal- normal size and non tender to palpation.      Assessment/Plan:  Lukasz was seen today for med refill.    Diagnoses and all orders for this visit:    Mood swing  -     QUEtiapine (SEROQUEL) 100 MG tablet; Take 1 tablet by mouth Every Night.    Essential hypertension  -     carvedilol (COREG) 25 MG tablet; Take 1 tablet by mouth 2 (Two) Times a Day With Meals.  -     Comprehensive metabolic panel  -     CBC & Differential    Abnormal laboratory test  -     fenofibrate (TRICOR) 145 MG tablet; Take 1 tablet by mouth Daily.    Secondary hypertension  -     losartan (COZAAR) 100 MG tablet; Take 1 tablet by mouth Daily. For high blood pressure    Mixed hyperlipidemia  -     Omega-3 Fatty Acids (FISH OIL) 1000 MG capsule capsule; Take 2 capsules by mouth Daily With Breakfast.    Gastroesophageal reflux disease without esophagitis  -     omeprazole (PRILOSEC) 10 MG capsule; Take 1 capsule by mouth Daily. For acid reflux    Moderate episode of recurrent major depressive disorder  -     QUEtiapine (SEROQUEL) 100 MG tablet; Take 1 tablet by mouth Every Night.    Hypertension, unspecified type  -     spironolactone (ALDACTONE) 25 MG tablet; Take 1 tablet by mouth Daily.  -     CloNIDine (CATAPRES) 0.1 MG tablet; Take 1 tablet by mouth Every Night. Check BP 4x daily. If> 180 SBP take x1.  -     Lipid Panel    Erectile dysfunction, unspecified erectile dysfunction type  -     vardenafil (LEVITRA) 10 MG tablet; Take 1 tablet by mouth Daily As Needed for erectile dysfunction.    Chronic gout of multiple sites, unspecified cause  -      colchicine 0.6 MG tablet; Take 2 tabs now, repeat 1 tab in an hour.  May repeat same steps again in 24 hours if needed.  He has chronic gout    Vitamin D deficiency  -     Vitamin D 25 Hydroxy        Morbid obesity:  BMI:  31.2     Weight:    205    Follow up plan:  Lose at least 3 pounds before next chronic visit, exercise at least 3 times a week for 30 minute increments, eat baked instead of fried foods, and eat healthier     -  Documentation of education   The patient BMI is outside this range and we recommended/discussed today to utilize a diet/exercise program to get back into the appropriate range.  Federal guidelines recommend that people under the age of 65 should have a BMI of 18.5-24.9  Food diary:  Bring to next visit  tial step is to document everything that is consumed. Pt's that have a food diary  Lose 2x the weight  by keeping track of foods.   Choose one bad food weekly and eliminate it from your diet.  Replace with one healthy food  Exercise diary: Goal over next 2-4 weeks is walk 30 minutes per day 5 days per week at pace difficult to hold conversation.   Drink more water, less soda.   Cut back on portion sizes.   Today we encouraged roughly a 1 lb per week weight loss with initial goal of 5% weight loss.  Avoid fast foods, eat more salads  If eating out for a meal, consider cutting food in half and placing into a to go container.  Individually portion any foods coming into the home   Use smaller plates  Drink 12 ozof water 30 minutes before meal as way to suppress appetite.  Discussed Contrave, metformin, topamax, Belviq and the cost of medications  Encouraged internet programs or self help books  Set  Goals that are realistic   Educated on ways to measure food  Baseball: 1 cup good for green salad, frozen yogurt, medium piece of fruit  Handful:  ½ cup good cut fruit, cooked vegetables, pasta, rice  Egg:  ¼ cup good for dried fruit  Deck of cards: 3 ounces good for meat and poultry  Check  book:  3 ounces grilled fish  Plate Method:  reduce plate size to 9 inch dinner plate.  Half of plate should be filled with non-starchy vegetables (broccoli, lettuce, cauliflower, tomatoes), ¼ plate with lean source of protein (lean chicken, turkey, fish), remaining ½ with whole grains (brown rice, potato, whole grain breads  Avoid liquid calories (regular soda, juice, coffee with cream)  Focus  on water, seltzer water and other non-calorie drinks  Replace regular sugar with non-caloric sweeteners  Avoid skipping meals: plan small regular meals throughout the day in order to keep your hunger controlled  Consider using meal replacements if unable to plan a healthy meal (protein shake, high protein bar)  Replace all white bread with whole wheat/whole grain alternative  Swap regular salad dressings (mayonnaise, butter, or low fat or fat free alternative)  Avoid high fat, high calorie, high carbohydrate snakes (cookies, pastries, cakes)  Snack on fruits, low fat dairy (yogurt, cottage cheese)            Management plan:  Take medications as prescribed; return to the clinic of new or worsening concerns.       Risks/benefits of current and new medications discussed with the patient and or family today.  The patient/family are aware and accept that if there any side effects they should call or return to clinic as soon as possible.  Appropriate F/U discussed for topics addressed today. All questions were answered to the satisfactory state of patient/family.  Should symptoms fail to improve or worsen they agree to call or return to clinic or to go to the ER. Education handouts were offered on any new Rx if requested.  Discussed the importance of following up with any needed screening tests/labs/specialist appointments and any requested follow-up recommended by me today.  Importance of maintaining follow-up discussed and patient accepts that missed appointments can delay diagnosis and potentially lead to worsening of  conditions.    Return in about 6 months (around 11/16/2018) for Recheck  htn, hyperlipidemia and depression.    Linda Hoffman, DNP, APRN  5/16/2018

## 2018-05-17 LAB
25(OH)D3+25(OH)D2 SERPL-MCNC: 41.2 NG/ML (ref 30–100)
ALBUMIN SERPL-MCNC: 4.2 G/DL (ref 3.5–5)
ALBUMIN/GLOB SERPL: 1.4 G/DL (ref 1.1–2.5)
ALP SERPL-CCNC: 56 U/L (ref 24–120)
ALT SERPL-CCNC: 30 U/L (ref 0–54)
AST SERPL-CCNC: 41 U/L (ref 7–45)
BASOPHILS # BLD AUTO: 0.05 10*3/MM3 (ref 0–0.2)
BASOPHILS NFR BLD AUTO: 0.9 % (ref 0–2)
BILIRUB SERPL-MCNC: 0.4 MG/DL (ref 0.1–1)
BUN SERPL-MCNC: 28 MG/DL (ref 5–21)
BUN/CREAT SERPL: 14.7 (ref 7–25)
CALCIUM SERPL-MCNC: 9.9 MG/DL (ref 8.4–10.4)
CHLORIDE SERPL-SCNC: 105 MMOL/L (ref 98–110)
CHOLEST SERPL-MCNC: 229 MG/DL (ref 130–200)
CO2 SERPL-SCNC: 28 MMOL/L (ref 24–31)
CREAT SERPL-MCNC: 1.9 MG/DL (ref 0.5–1.4)
EOSINOPHIL # BLD AUTO: 0.26 10*3/MM3 (ref 0–0.7)
EOSINOPHIL NFR BLD AUTO: 4.6 % (ref 0–4)
ERYTHROCYTE [DISTWIDTH] IN BLOOD BY AUTOMATED COUNT: 14 % (ref 12–15)
GFR SERPLBLD CREATININE-BSD FMLA CKD-EPI: 37 ML/MIN/1.73
GFR SERPLBLD CREATININE-BSD FMLA CKD-EPI: 45 ML/MIN/1.73
GLOBULIN SER CALC-MCNC: 3 GM/DL
GLUCOSE SERPL-MCNC: 110 MG/DL (ref 70–100)
HCT VFR BLD AUTO: 41 % (ref 40–52)
HDLC SERPL-MCNC: 39 MG/DL
HGB BLD-MCNC: 13.1 G/DL (ref 14–18)
IMM GRANULOCYTES # BLD: 0.02 10*3/MM3 (ref 0–0.03)
IMM GRANULOCYTES NFR BLD: 0.4 % (ref 0–5)
LDLC SERPL CALC-MCNC: 126 MG/DL (ref 0–99)
LYMPHOCYTES # BLD AUTO: 1.83 10*3/MM3 (ref 0.72–4.86)
LYMPHOCYTES NFR BLD AUTO: 32.1 % (ref 15–45)
MCH RBC QN AUTO: 29.6 PG (ref 28–32)
MCHC RBC AUTO-ENTMCNC: 32 G/DL (ref 33–36)
MCV RBC AUTO: 92.6 FL (ref 82–95)
MONOCYTES # BLD AUTO: 0.64 10*3/MM3 (ref 0.19–1.3)
MONOCYTES NFR BLD AUTO: 11.2 % (ref 4–12)
NEUTROPHILS # BLD AUTO: 2.9 10*3/MM3 (ref 1.87–8.4)
NEUTROPHILS NFR BLD AUTO: 50.8 % (ref 39–78)
NRBC BLD AUTO-RTO: 0 /100 WBC (ref 0–0)
PLATELET # BLD AUTO: 304 10*3/MM3 (ref 130–400)
POTASSIUM SERPL-SCNC: 4.6 MMOL/L (ref 3.5–5.3)
PROT SERPL-MCNC: 7.2 G/DL (ref 6.3–8.7)
RBC # BLD AUTO: 4.43 10*6/MM3 (ref 4.8–5.9)
SODIUM SERPL-SCNC: 144 MMOL/L (ref 135–145)
TRIGL SERPL-MCNC: 321 MG/DL (ref 0–149)
VLDLC SERPL CALC-MCNC: 64.2 MG/DL
WBC # BLD AUTO: 5.7 10*3/MM3 (ref 4.8–10.8)

## 2018-05-29 ENCOUNTER — OFFICE VISIT (OUTPATIENT)
Dept: FAMILY MEDICINE CLINIC | Facility: CLINIC | Age: 53
End: 2018-05-29

## 2018-05-29 VITALS
WEIGHT: 217.2 LBS | HEART RATE: 72 BPM | SYSTOLIC BLOOD PRESSURE: 140 MMHG | DIASTOLIC BLOOD PRESSURE: 88 MMHG | BODY MASS INDEX: 33.03 KG/M2 | RESPIRATION RATE: 18 BRPM | OXYGEN SATURATION: 100 %

## 2018-05-29 DIAGNOSIS — M25.511 CHRONIC PAIN OF BOTH SHOULDERS: Primary | ICD-10-CM

## 2018-05-29 DIAGNOSIS — G89.29 CHRONIC PAIN OF BOTH SHOULDERS: Primary | ICD-10-CM

## 2018-05-29 DIAGNOSIS — M25.512 CHRONIC PAIN OF BOTH SHOULDERS: Primary | ICD-10-CM

## 2018-05-29 PROCEDURE — 99214 OFFICE O/P EST MOD 30 MIN: CPT | Performed by: NURSE PRACTITIONER

## 2018-05-29 NOTE — PATIENT INSTRUCTIONS
Shoulder Pain  Many things can cause shoulder pain, including:  · An injury to the area.  · Overuse of the shoulder.  · Arthritis.  The source of the pain can be:  · Inflammation.  · An injury to the shoulder joint.  · An injury to a tendon, ligament, or bone.  Follow these instructions at home:  Take these actions to help with your pain:  · Squeeze a soft ball or a foam pad as much as possible. This helps to keep the shoulder from swelling. It also helps to strengthen the arm.  · Take over-the-counter and prescription medicines only as told by your health care provider.  · If directed, apply ice to the area:  ¨ Put ice in a plastic bag.  ¨ Place a towel between your skin and the bag.  ¨ Leave the ice on for 20 minutes, 2-3 times per day. Stop applying ice if it does not help with the pain.  · If you were given a shoulder sling or immobilizer:  ¨ Wear it as told.  ¨ Remove it to shower or bathe.  ¨ Move your arm as little as possible, but keep your hand moving to prevent swelling.  Contact a health care provider if:  · Your pain gets worse.  · Your pain is not relieved with medicines.  · New pain develops in your arm, hand, or fingers.  Get help right away if:  · Your arm, hand, or fingers:  ¨ Tingle.  ¨ Become numb.  ¨ Become swollen.  ¨ Become painful.  ¨ Turn white or blue.  This information is not intended to replace advice given to you by your health care provider. Make sure you discuss any questions you have with your health care provider.  Document Released: 09/27/2006 Document Revised: 08/13/2017 Document Reviewed: 04/11/2016  [employer] Interactive Patient Education © 2017 [employer]

## 2018-05-29 NOTE — PROGRESS NOTES
Chief Complaint   Patient presents with   • Shoulder Pain     Referral           HPI  Lukasz Savage is a 53 y.o. male presents today for follow up for complaints of bilateral shoulder pain that has been going on for several months.   He says that his left shoulder comes out of joint and he has to pop it back in.  Has numbness and tingling down his arms into palms.  Says when he sits still the pain is 0/10 but when he has to move them pain is 5/10.      Chronic problems: HTN stable with carvediolol, clonidine, spironlactone and losartan.  Hyperlipidemia stable with fenofibrate and omega 3 fatty acids,  GERD stable with omeprazole, ED stable with vardenafi,  Gout multiple joints stable with colchicine, depression/anxiety stable with quetiapine, arthritis/RA stable with otc tylenol, and sleep apnea stable with CPAP.     PCP:  Linda Hoffman, DNP, APRN    Allergies   Allergen Reactions   • Lipitor [Atorvastatin] Rash   • Lisinopril Angioedema       Past Medical History:   Diagnosis Date   • Angio-edema 7/3/2017   • Anxiety    • Arthritis    • Clostridium difficile colitis    • Erectile dysfunction 10/6/2016   • Gout    • HCAP (healthcare-associated pneumonia) 7/11/2017   • Hyperlipidemia    • Malignant hypertension    • MSSA (methicillin susceptible Staphylococcus aureus) infection 7/31/2017   • Obstructive sleep apnea    • Seizures 7/4/2017       Past Surgical History:   Procedure Laterality Date   • TRACHEOSTOMY N/A 7/12/2017    Procedure: TRACHEOSTOMY WITH TRACHEOSCOPY;  Surgeon: Jairon Pierson MD;  Location: [facility];  Service:        Social History     Social History   • Marital status: Single     Social History Main Topics   • Smoking status: Former Smoker     Packs/day: 0.33     Years: 30.00     Types: Cigarettes     Quit date: 7/3/2017   • Smokeless tobacco: Never Used   • Alcohol use No      Comment: PER DAY FOR 30 YEARS; Last drink July 3, 2017   • Drug use: No   • Sexual activity: Defer     Other  Topics Concern   • Not on file       Family History   Problem Relation Age of Onset   • Hypertension Mother    • Diabetes Mother    • Heart disease Father    • Hypertension Father    • No Known Problems Daughter    • No Known Problems Son    • Diabetes Maternal Grandmother    • No Known Problems Maternal Grandfather    • No Known Problems Paternal Grandmother    • Heart attack Paternal Grandfather    • Kidney disease Sister        Current Outpatient Prescriptions on File Prior to Visit   Medication Sig Dispense Refill   • carvedilol (COREG) 25 MG tablet Take 1 tablet by mouth 2 (Two) Times a Day With Meals. 180 tablet 1   • CloNIDine (CATAPRES) 0.1 MG tablet Take 1 tablet by mouth Every Night. Check BP 4x daily. If> 180 SBP take x1. 60 tablet 2   • colchicine 0.6 MG tablet Take 2 tabs now, repeat 1 tab in an hour.  May repeat same steps again in 24 hours if needed.  He has chronic gout 30 tablet 2   • fenofibrate (TRICOR) 145 MG tablet Take 1 tablet by mouth Daily. 90 tablet 1   • losartan (COZAAR) 100 MG tablet Take 1 tablet by mouth Daily. For high blood pressure 90 tablet 1   • Omega-3 Fatty Acids (FISH OIL) 1000 MG capsule capsule Take 2 capsules by mouth Daily With Breakfast. 180 capsule 1   • omeprazole (PRILOSEC) 10 MG capsule Take 1 capsule by mouth Daily. For acid reflux 90 capsule 1   • QUEtiapine (SEROQUEL) 100 MG tablet Take 1 tablet by mouth Every Night. 90 tablet 1   • spironolactone (ALDACTONE) 25 MG tablet Take 1 tablet by mouth Daily. 90 tablet 1   • vardenafil (LEVITRA) 10 MG tablet Take 1 tablet by mouth Daily As Needed for erectile dysfunction. 10 tablet 5     No current facility-administered medications on file prior to visit.         REVIEW OF SYMPTOMS: (Positives bolded)  General:  weight loss, fever, chills, night sweats, fatigue, appetite loss  HEENT:  blurry vision, eye pain, eye discharge, dry eyes  Respiratory: shortness of breath, cough, hemoptysis, wheezing, pleurisy,   Cardiovascular:   "chest pain, PND, palpitation, edema, orthopnea, syncope, swelling of extremities  Gastro: Nausea, vomiting, diarrhea, hematemesis, abdominal pain, constipation  Genito: hematuria, dysuria, glycosuria, hesitancy, frequency, incontinence  Musckelo: Arthralgia, myalgia, muscle weakness, joint swelling, NSAID use  Skin: rash, pruritis, sores, nail changes, skin thickening, change in wart/mole, itching, rash, new lesions, pruritus, nail changes  Neuro:  Migraine, numbness, ataxia, tremor, vertigo, weakness, memory loss  \"All other systems reviewed and negative, except as listed above.”      OBJECTIVE:  Constitutional:  Appearance-No acute distress, Consistent with stated age. Orientation- Oriented x 3, alert Posture-Not doubled over. Gait-Normal pace, normal arm movement. Posture- Normal Build and Nutrition-Well developed and well nourished.  General- Patient is pleasant and cooperative with the interview and exam.    Integumentary: General-No rashes, ulcers or lesions. No edema.  Palpation- Normal skin moisture/turgor. Skin is warm to touch, no increased warmth. Capillary refill is normal bilateral Upper and lower extremity.     Head/Neck: Head- normocephalic and atraumatic.  Neck- without visible/palpable lumps or pulsations.  Palpation- No bony tenderness about head/neck along frontal, occiptial, temporal, parietal, mastoid, jawline, zygoma, orbit or any other location.  NO temporal artery tenderness. No TMJ tenderness. Normal cervical ROM.   Neck Supple.  Thyroid-No thyromegaly, no nodules    CHEST/LUNG: Inspection- symmetric chest wall no pectus deformity. Normal effort, no distress, no use of accessory muscles. Palpation- nontender sternum, ribline.  No abnormal pulsations. Auscultation- Breath sounds normal throughout all lung fields.  Normal tracheal sounds, Normal bronchial sounds overlying sternum, Bronchovessicular sounds normal between scapulae posteriorly, Normal vessicular breath sounds heard throughout " periphery. Lungs are clear today. Adventitious sounds- No wheezes, rales, rhonchi.     CARDIOVASCULAR:  Carotid artery- normal, no bruits or abnormal pulsations. Jugular vein- no pulsations. Palpation/Percussion- Normal PMI, no palpable thrill  Auscultation- Regular rate and rhythm. No murmur noted in sitting, supine positions. Extremities- no digital clubbing, cyanosis, edema, increased warmth.    Peripheral Vascular: Upper extremity Left- Normal temperature with pink nailbeds and no ulcerations.  Upper extremity Right- Normal temperature with pink nailbeds and no ulcerations.  Lower extremity- Normal temperature with pink nailbeds and no ulcerations. DP pulses 2+ bilaterally.  Pedal hair intact.  Normal capillary refill. Edema- No edema.    Musculoskeletal: Generalized-No generalized swelling or edema of extremities, no digital clubbing or cyanosis, neurovascularly intact all four extremities.  Bilateral Upper extremities- Symmetrical posture.  No visible deformity.  Normal sensation along medial and lateral upper extremity proximally and distally.  Has tenderness on palpation of bilateral shoulders and shoulder blades.  He is not able to put arms behind back, he can't cross arms over to opposite shoulder,  Can pronate and supinate with pain.   3/5, strength 3/5.  Decreased ROM. Elbow palpated, no tenderness overlying olecranon.  Rotator cuff evaluated and intact.  Normal wrist ROM bilaterally. Normal hand movement, intrinsic muscles of hands normal. No tenderness to palpation of hands/wrists/elbows.  Lower extremity- Not tender to palpation, no pain, no swelling, edema or erythema of surrounding tissue, normal strength and tone.  Normal appearing hip ROM bilaterally without pain.  Knee ROM normal at 0-120 degrees. No tenderness overlying trochanters, no tenderness about patella, quad tendon, patellar tendon.  No tenderness at tibial tuberosity. Ankle normal ROM not tender to palpation along medial/lateral  malleolus. Normal movement of toes, no tenderness bilateral feet/toes.  Normal foot type. Calves symmetrical.  Stretching demonstrated today.      Neurological: General- Moves all 4 extremities symmetrically. Symmetrical face and body posture. Cranial nerves- individually evaluated II-XII and intact. PERRLA, Normal EOMI, visual/special senses appear intact, Face is symmetrical and normal sensation/movement, normal tongue, normal strength/posture of neck musculature. Balance- Romberg intact.  Reflexes- ntact with DTR 2+ patellar, Achilles, bicep, brachial,tricep. Ankle clonus normal with 2 beats.  Strength- 5/5 bilateral UE and LE. Soft touch- intact bilateral UE and LE.  Temperature sensation- intact bilateral UE and LE. Cerebellar testing-Rapid alternating movements intact.  Heel shin intact. Able to walk normal gait, normal heel toe walking. Neck- supple.      Neuropsych: Oriented- Person, place, time. (AAOx3), Mood/affect- normal and congruent. Able to articulate well. Speech-Normal speech, normal rate, normal tone, normal use of language, volume and coherence.  Thought content- normal with ability to perform basic computations and apply abstract thought/reason. Associations- intact, no SI/HI, no hallucinations, delusions, obsessions.  Judgment/insight- Appropriate. Memory-Recall intact, remote and recent memory intact. Knowledge- Age appropriate fund of knowledge, concentration and attention span normal.    Lymphatic: Head/Neck- normal size and non tender to palpation. Axillary- Head and neck LN are normal size and non tender to palpation. Femoral and Inguinal- normal size and non tender to palpation.      Assessment/Plan:  Lukasz was seen today for shoulder pain.    Diagnoses and all orders for this visit:    Chronic pain of both shoulders  -     Ambulatory Referral to Orthopedic Surgery      Morbid obesity:  BMI:   33.0    Weight:   217.3     Follow up plan:  Lose at least 3 pounds before next chronic visit,  exercise at least 3 times a week for 30 minute increments, eat baked instead of fried foods, and eat healthier     -  Documentation of education   The patient BMI is outside this range and we recommended/discussed today to utilize a diet/exercise program to get back into the appropriate range.  Federal guidelines recommend that people under the age of 65 should have a BMI of 18.5-24.9  Food diary:  Bring to next visit  tial step is to document everything that is consumed. Pt's that have a food diary  Lose 2x the weight  by keeping track of foods.   Choose one bad food weekly and eliminate it from your diet.  Replace with one healthy food  Exercise diary: Goal over next 2-4 weeks is walk 30 minutes per day 5 days per week at pace difficult to hold conversation.   Drink more water, less soda.   Cut back on portion sizes.   Today we encouraged roughly a 1 lb per week weight loss with initial goal of 5% weight loss.  Avoid fast foods, eat more salads  If eating out for a meal, consider cutting food in half and placing into a to go container.  Individually portion any foods coming into the home   Use smaller plates  Drink 12 ozof water 30 minutes before meal as way to suppress appetite.  Discussed Contrave, metformin, topamax, Belviq and the cost of medications  Encouraged internet programs or self help books  Set  Goals that are realistic   Educated on ways to measure food  Baseball: 1 cup good for green salad, frozen yogurt, medium piece of fruit  Handful:  ½ cup good cut fruit, cooked vegetables, pasta, rice  Egg:  ¼ cup good for dried fruit  Deck of cards: 3 ounces good for meat and poultry  Check book:  3 ounces grilled fish  Plate Method:  reduce plate size to 9 inch dinner plate.  Half of plate should be filled with non-starchy vegetables (broccoli, lettuce, cauliflower, tomatoes), ¼ plate with lean source of protein (lean chicken, turkey, fish), remaining ½ with whole grains (brown rice, potato, whole grain  breads  Avoid liquid calories (regular soda, juice, coffee with cream)  Focus  on water, seltzer water and other non-calorie drinks  Replace regular sugar with non-caloric sweeteners  Avoid skipping meals: plan small regular meals throughout the day in order to keep your hunger controlled  Consider using meal replacements if unable to plan a healthy meal (protein shake, high protein bar)  Replace all white bread with whole wheat/whole grain alternative  Swap regular salad dressings (mayonnaise, butter, or low fat or fat free alternative)  Avoid high fat, high calorie, high carbohydrate snakes (cookies, pastries, cakes)  Snack on fruits, low fat dairy (yogurt, cottage cheese)            Management plan:  Take medications as prescribed; return to the clinic of new or worsening concerns.       Risks/benefits of current and new medications discussed with the patient and or family today.  The patient/family are aware and accept that if there any side effects they should call or return to clinic as soon as possible.  Appropriate F/U discussed for topics addressed today. All questions were answered to the satisfactory state of patient/family.  Should symptoms fail to improve or worsen they agree to call or return to clinic or to go to the ER. Education handouts were offered on any new Rx if requested.  Discussed the importance of following up with any needed screening tests/labs/specialist appointments and any requested follow-up recommended by me today.  Importance of maintaining follow-up discussed and patient accepts that missed appointments can delay diagnosis and potentially lead to worsening of conditions.    Return in about 1 month (around 6/29/2018).    Linda Hoffman, DNP, APRN  5/29/2018

## 2018-06-04 ENCOUNTER — OFFICE VISIT (OUTPATIENT)
Dept: FAMILY MEDICINE CLINIC | Facility: CLINIC | Age: 53
End: 2018-06-04

## 2018-06-04 VITALS
HEART RATE: 82 BPM | HEIGHT: 68 IN | DIASTOLIC BLOOD PRESSURE: 88 MMHG | OXYGEN SATURATION: 100 % | BODY MASS INDEX: 31.77 KG/M2 | RESPIRATION RATE: 18 BRPM | SYSTOLIC BLOOD PRESSURE: 148 MMHG | WEIGHT: 209.6 LBS | TEMPERATURE: 98.2 F

## 2018-06-04 DIAGNOSIS — F33.1 MODERATE EPISODE OF RECURRENT MAJOR DEPRESSIVE DISORDER (HCC): Primary | ICD-10-CM

## 2018-06-04 DIAGNOSIS — G89.4 CHRONIC PAIN SYNDROME: ICD-10-CM

## 2018-06-04 PROCEDURE — 96372 THER/PROPH/DIAG INJ SC/IM: CPT | Performed by: NURSE PRACTITIONER

## 2018-06-04 PROCEDURE — 99214 OFFICE O/P EST MOD 30 MIN: CPT | Performed by: NURSE PRACTITIONER

## 2018-06-04 RX ORDER — KETOROLAC TROMETHAMINE 30 MG/ML
30 INJECTION, SOLUTION INTRAMUSCULAR; INTRAVENOUS ONCE
Qty: 1 ML | Refills: 0 | Status: SHIPPED | OUTPATIENT
Start: 2018-06-04 | End: 2018-06-04

## 2018-06-04 RX ORDER — KETOROLAC TROMETHAMINE 30 MG/ML
30 INJECTION, SOLUTION INTRAMUSCULAR; INTRAVENOUS ONCE
Status: DISCONTINUED | OUTPATIENT
Start: 2018-06-04 | End: 2018-06-04

## 2018-06-04 RX ORDER — KETOROLAC TROMETHAMINE 30 MG/ML
30 INJECTION, SOLUTION INTRAMUSCULAR; INTRAVENOUS ONCE
Status: COMPLETED | OUTPATIENT
Start: 2018-06-04 | End: 2018-06-04

## 2018-06-04 RX ORDER — VENLAFAXINE HYDROCHLORIDE 37.5 MG/1
37.5 CAPSULE, EXTENDED RELEASE ORAL DAILY
Qty: 30 CAPSULE | Refills: 0 | Status: SHIPPED | OUTPATIENT
Start: 2018-06-04 | End: 2018-06-28 | Stop reason: DRUGHIGH

## 2018-06-04 RX ADMIN — KETOROLAC TROMETHAMINE 30 MG: 30 INJECTION, SOLUTION INTRAMUSCULAR; INTRAVENOUS at 16:10

## 2018-06-04 NOTE — PATIENT INSTRUCTIONS
Chronic Pain, Adult  Chronic pain is a type of pain that lasts or keeps coming back (recurs) for at least six months. You may have chronic headaches, abdominal pain, or body pain. Chronic pain may be related to an illness, such as fibromyalgia or complex regional pain syndrome. Sometimes the cause of chronic pain is not known.  Chronic pain can make it hard for you to do daily activities. If not treated, chronic pain can lead to other health problems, including anxiety and depression. Treatment depends on the cause and severity of your pain. You may need to work with a pain specialist to come up with a treatment plan. The plan may include medicine, counseling, and physical therapy. Many people benefit from a combination of two or more types of treatment to control their pain.  Follow these instructions at home:  Lifestyle   · Consider keeping a pain diary to share with your health care providers.  · Consider talking with a mental health care provider (psychologist) about how to cope with chronic pain.  · Consider joining a chronic pain support group.  · Try to control or lower your stress levels. Talk to your health care provider about strategies to do this.  General instructions     · Take over-the-counter and prescription medicines only as told by your health care provider.  · Follow your treatment plan as told by your health care provider. This may include:  ¨ Gentle, regular exercise.  ¨ Eating a healthy diet that includes foods such as vegetables, fruits, fish, and lean meats.  ¨ Cognitive or behavioral therapy.  ¨ Working with a physical therapist.  ¨ Meditation or yoga.  ¨ Acupuncture or massage therapy.  ¨ Aroma, color, light, or sound therapy.  ¨ Local electrical stimulation.  ¨ Shots (injections) of numbing or pain-relieving medicines into the spine or the area of pain.  · Check your pain level as told by your health care provider. Ask your health care provider if you should use a pain scale.  · Learn as  much as you can about how to manage your chronic pain. Ask your health care provider if an intensive pain rehabilitation program or a chronic pain specialist would be helpful.  · Keep all follow-up visits as told by your health care provider. This is important.  Contact a health care provider if:  · Your pain gets worse.  · You have new pain.  · You have trouble sleeping.  · You have trouble doing your normal activities.  · Your pain is not controlled with treatment.  · Your have side effects from pain medicine.  · You feel weak.  Get help right away if:  · You lose feeling or have numbness in your body.  · You lose control of bowel or bladder function.  · Your pain suddenly gets much worse.  · You develop shaking or chills.  · You develop confusion.  · You develop chest pain.  · You have trouble breathing or shortness of breath.  · You pass out.  · You have thoughts about hurting yourself or others.  This information is not intended to replace advice given to you by your health care provider. Make sure you discuss any questions you have with your health care provider.  Document Released: 09/09/2003 Document Revised: 08/17/2017 Document Reviewed: 06/06/2017  [employer] Patient Education © 2017 [employer]

## 2018-06-04 NOTE — PROGRESS NOTES
Chief Complaint   Patient presents with   • Shoulder Pain     Pt states he is having shoulder, back, left knee and wrist pain.          HPI  Lukasz Savage is a 53 y.o. male presents today for depression says that the seroquel isn't working needs something else doesn't care about nothing.  Is not suicidal or homicidal.  Complains of pain everywhere, back, shoulders legs, gout. Sertraline has been tried before according to pt.     Chronic problems: HTN stable with carvediolol, clonidine, spironlactone and losartan.  Hyperlipidemia stable with fenofibrate and omega 3 fatty acids,  GERD stable with omeprazole, ED stable with vardenafi,  Gout multiple joints stable with colchicine, depression/anxiety stable with quetiapine, arthritis/RA stable with otc tylenol, and sleep apnea stable with CPAP.   He also was admitted to hospital 7/3/17 for the following:  Possible angioedema related to lisinopril prior to presentation and tongue bite. Malignant hypertension at presentation and still difficult to control. Possible new onset seizure disorder versus convulsive syncope.  Posterior reversible encephalopathy syndrome on MRI. Tongue bite with sublingual hematoma, much improved.  Tobacco abuse.  Obstructive sleep apnea, noncompliant with device.  Daily alcohol use with recent Delirium tremens requiring sedation and mechanical ventilation. 9. Hypokalemia, replaced.   Clostridium difficile colitis.  Possible lingual or sublingual infection, MSSA on culture.  Bilateral lower lobe infiltrates consistent with ventilator associated pneumonia combined with atelectasis.  Acute gouty arthritis of the left great toe.    PCP:  Linda Hoffman, DNP, APRN    Allergies   Allergen Reactions   • Lipitor [Atorvastatin] Rash   • Lisinopril Angioedema       Past Medical History:   Diagnosis Date   • Angio-edema 7/3/2017   • Anxiety    • Arthritis    • Clostridium difficile colitis    • Erectile dysfunction 10/6/2016   • Gout    • HCAP  (healthcare-associated pneumonia) 7/11/2017   • Hyperlipidemia    • Malignant hypertension    • MSSA (methicillin susceptible Staphylococcus aureus) infection 7/31/2017   • Obstructive sleep apnea    • Seizures 7/4/2017       Past Surgical History:   Procedure Laterality Date   • TRACHEOSTOMY N/A 7/12/2017    Procedure: TRACHEOSTOMY WITH TRACHEOSCOPY;  Surgeon: Jairon Pierson MD;  Location: [facility];  Service:        Social History     Social History   • Marital status: Single     Social History Main Topics   • Smoking status: Former Smoker     Packs/day: 0.33     Years: 30.00     Types: Cigarettes     Quit date: 7/3/2017   • Smokeless tobacco: Never Used   • Alcohol use No      Comment: PER DAY FOR 30 YEARS; Last drink July 3, 2017   • Drug use: No   • Sexual activity: Defer     Other Topics Concern   • Not on file       Family History   Problem Relation Age of Onset   • Hypertension Mother    • Diabetes Mother    • Heart disease Father    • Hypertension Father    • No Known Problems Daughter    • No Known Problems Son    • Diabetes Maternal Grandmother    • No Known Problems Maternal Grandfather    • No Known Problems Paternal Grandmother    • Heart attack Paternal Grandfather    • Kidney disease Sister        Current Outpatient Prescriptions on File Prior to Visit   Medication Sig Dispense Refill   • carvedilol (COREG) 25 MG tablet Take 1 tablet by mouth 2 (Two) Times a Day With Meals. 180 tablet 1   • CloNIDine (CATAPRES) 0.1 MG tablet Take 1 tablet by mouth Every Night. Check BP 4x daily. If> 180 SBP take x1. 60 tablet 2   • colchicine 0.6 MG tablet Take 2 tabs now, repeat 1 tab in an hour.  May repeat same steps again in 24 hours if needed.  He has chronic gout 30 tablet 2   • fenofibrate (TRICOR) 145 MG tablet Take 1 tablet by mouth Daily. 90 tablet 1   • losartan (COZAAR) 100 MG tablet Take 1 tablet by mouth Daily. For high blood pressure 90 tablet 1   • Omega-3 Fatty Acids (FISH OIL) 1000 MG capsule  "capsule Take 2 capsules by mouth Daily With Breakfast. 180 capsule 1   • omeprazole (PRILOSEC) 10 MG capsule Take 1 capsule by mouth Daily. For acid reflux 90 capsule 1   • QUEtiapine (SEROQUEL) 100 MG tablet Take 1 tablet by mouth Every Night. 90 tablet 1   • spironolactone (ALDACTONE) 25 MG tablet Take 1 tablet by mouth Daily. 90 tablet 1   • vardenafil (LEVITRA) 10 MG tablet Take 1 tablet by mouth Daily As Needed for erectile dysfunction. 10 tablet 5     No current facility-administered medications on file prior to visit.         REVIEW OF SYMPTOMS: (Positives bolded)  General:  weight loss, fever, chills, night sweats, fatigue, appetite loss  HEENT:  blurry vision, eye pain, eye discharge, dry eyes, decreased vision,  Respiratory: shortness of breath, cough, hemoptysis, wheezing, pleurisy,   Cardiovascular:  chest pain, PND, palpitation, edema, orthopnea, syncope, swelling of extremities  Gastro: Nausea, vomiting, diarrhea, hematemesis, abdominal pain, constipation  Genito: hematuria, dysuria, glycosuria, hesitancy, frequency, incontinence  Musckelo: Arthralgia, myalgia, muscle weakness, joint swelling, NSAID use  Skin: rash, pruritis, sores, nail changes, skin thickening, change in wart/mole, itching, rash, new lesions, pruritus, nail changes  Neuro:  Migraine, numbness, ataxia, tremor, vertigo, weakness, memory loss, depression  \"All other systems reviewed and negative, except as listed above.”      OBJECTIVE:  Constitutional:  Appearance-No acute distress, Consistent with stated age. Orientation- Oriented x 3, alert Posture-Not doubled over. Gait-Normal pace, normal arm movement. Posture- Normal Build and Nutrition-Well developed and well nourished.  General- Patient is pleasant and cooperative with the interview and exam.    Integumentary: General-No rashes, ulcers or lesions. No edema.  Palpation- Normal skin moisture/turgor. Skin is warm to touch, no increased warmth. Capillary refill is normal bilateral " Upper and lower extremity.     Head/Neck: Head- normocephalic and atraumatic.  Neck- without visible/palpable lumps or pulsations.  Palpation- No bony tenderness about head/neck along frontal, occiptial, temporal, parietal, mastoid, jawline, zygoma, orbit or any other location.  NO temporal artery tenderness. No TMJ tenderness. Normal cervical ROM.   Neck Supple.  Thyroid-No thyromegaly, no nodules    CHEST/LUNG: Inspection- symmetric chest wall no pectus deformity. Normal effort, no distress, no use of accessory muscles. Palpation- nontender sternum, ribline.  No abnormal pulsations. Auscultation- Breath sounds normal throughout all lung fields.  Normal tracheal sounds, Normal bronchial sounds overlying sternum, Bronchovessicular sounds normal between scapulae posteriorly, Normal vessicular breath sounds heard throughout periphery. Lungs are clear today. Adventitious sounds- No wheezes, rales, rhonchi.     CARDIOVASCULAR:  Carotid artery- normal, no bruits or abnormal pulsations. Jugular vein- no pulsations. Palpation/Percussion- Normal PMI, no palpable thrill  Auscultation- Regular rate and rhythm. No murmur noted in sitting, supine positions. Extremities- no digital clubbing, cyanosis, edema, increased warmth.    ABDOMEN: Inspection- normal and no visible pulsations. Normal contour. Auscultation- Bowel sounds normal, no abdominal bruits. Palpation/Percussion- soft, non-tender, no rebound tenderness, no rigidity (guarding), no jar tenderness, no masses.  Liver-no hepatomegaly, Spleen - no splenomegaly, Hernias- none. Rectal not examined.     Peripheral Vascular: Upper extremity Left- Normal temperature with pink nailbeds and no ulcerations.  Upper extremity Right- Normal temperature with pink nailbeds and no ulcerations.  Lower extremity- Normal temperature with pink nailbeds and no ulcerations. DP pulses 2+ bilaterally.  Pedal hair intact.  Normal capillary refill. Edema- No edema.    Musculoskeletal:  Generalized-No generalized swelling or edema of extremities, no digital clubbing or cyanosis, neurovascularly intact all four extremities.  Upper extremity- Symmetrical posture.  No visible deformity.  Normal sensation along medial and lateral upper extremity proximally and distally.  NO tenderness overlying shoulder, lateral/medial epicondyle.  5/5 and strength 5/5 bilateral UE.  Elbow palpated, no tenderness overlying olecranon.  Normal supination, pronation to active/passive ROM and to resisted rotation. Bicep insertion/tricep insertion appear normal without obvious pathology. Rotator cuff evaluated and intact.  Normal wrist ROM bilaterally. Normal hand movement, intrinsic muscles of hands normal. No tenderness to palpation of hands/wrists/elbows.  Lower extremity- Not tender to palpation, no pain, no swelling, edema or erythema of surrounding tissue, normal strength and tone.  Normal appearing hip ROM bilaterally without pain.  Knee ROM normal at 0-120 degrees. No tenderness overlying trochanters, no tenderness about patella, quad tendon, patellar tendon.  No tenderness at tibial tuberosity. Ankle normal ROM not tender to palpation along medial/lateral malleolus. Normal movement of toes, no tenderness bilateral feet/toes.  Normal foot type. Calves symmetrical.  Stretching demonstrated today.  Spine/Ribs- No deformities, masses or tenderness, no known fractures, normal strength, Normal ROM. Normal stability No tenderness along C/T/L spine.  Normal appearing ROM about spine.      Neurological: General- Moves all 4 extremities symmetrically. Symmetrical face and body posture. Cranial nerves- individually evaluated II-XII and intact. PERRLA, Normal EOMI, visual/special senses appear intact, Face is symmetrical and normal sensation/movement, normal tongue, normal strength/posture of neck musculature. Balance- Romberg intact.  Reflexes- ntact with DTR 2+ patellar, Achilles, bicep, brachial,tricep. Ankle clonus  normal with 2 beats.  Strength- 5/5 bilateral UE and LE. Soft touch- intact bilateral UE and LE.  Temperature sensation- intact bilateral UE and LE. Cerebellar testing-Rapid alternating movements intact.  Heel shin intact. Able to walk normal gait, normal heel toe walking. Neck- supple.      Neuropsych: Oriented- Person, place, time. (AAOx3), Mood/affect- normal and congruent. Able to articulate well. Speech-Normal speech, normal rate, normal tone, normal use of language, volume and coherence.  Thought content- normal with ability to perform basic computations and apply abstract thought/reason. Associations- intact, no SI/HI, no hallucinations, delusions, obsessions.  Judgment/insight- Appropriate. Memory-Recall intact, remote and recent memory intact. Knowledge- Age appropriate fund of knowledge, concentration and attention span normal.    Lymphatic: Head/Neck- normal size and non tender to palpation. Axillary- Head and neck LN are normal size and non tender to palpation. Femoral and Inguinal- normal size and non tender to palpation.      Assessment/Plan:  Lkuasz was seen today for shoulder pain.    Diagnoses and all orders for this visit:    Moderate episode of recurrent major depressive disorder  -     Effexor 37.5 twice daily            -     Stop the seroquel     Chronic pain syndrome  -     ketorolac (TORADOL) 30 MG/ML injection; Inject 30 mg into the shoulder, thigh, or buttocks 1 (One) Time for 1 dose.  -     Ambulatory Referral to Pain Management    Morbid obesity:  BMI:    31.9   Weight:    209  Deferred today due to other problems does not want to discuss          Management plan:  Take medications as prescribed; return to the clinic of new or worsening concerns.       Risks/benefits of current and new medications discussed with the patient and or family today.  The patient/family are aware and accept that if there any side effects they should call or return to clinic as soon as possible.  Appropriate  F/U discussed for topics addressed today. All questions were answered to the satisfactory state of patient/family.  Should symptoms fail to improve or worsen they agree to call or return to clinic or to go to the ER. Education handouts were offered on any new Rx if requested.  Discussed the importance of following up with any needed screening tests/labs/specialist appointments and any requested follow-up recommended by me today.  Importance of maintaining follow-up discussed and patient accepts that missed appointments can delay diagnosis and potentially lead to worsening of conditions.    Return in about 1 week (around 6/11/2018), or if symptoms worsen or fail to improve.    Linda Hoffman, DNP, APRN  6/4/2018

## 2018-06-28 ENCOUNTER — OFFICE VISIT (OUTPATIENT)
Dept: FAMILY MEDICINE CLINIC | Facility: CLINIC | Age: 53
End: 2018-06-28

## 2018-06-28 VITALS
SYSTOLIC BLOOD PRESSURE: 160 MMHG | HEART RATE: 79 BPM | DIASTOLIC BLOOD PRESSURE: 120 MMHG | WEIGHT: 216.2 LBS | BODY MASS INDEX: 32.77 KG/M2 | HEIGHT: 68 IN | OXYGEN SATURATION: 98 % | RESPIRATION RATE: 18 BRPM | TEMPERATURE: 98.1 F

## 2018-06-28 DIAGNOSIS — I10 ESSENTIAL HYPERTENSION: Primary | ICD-10-CM

## 2018-06-28 DIAGNOSIS — F33.1 MODERATE EPISODE OF RECURRENT MAJOR DEPRESSIVE DISORDER (HCC): ICD-10-CM

## 2018-06-28 PROCEDURE — 99214 OFFICE O/P EST MOD 30 MIN: CPT | Performed by: NURSE PRACTITIONER

## 2018-06-28 RX ORDER — CLONIDINE HYDROCHLORIDE 0.1 MG/1
0.2 TABLET ORAL ONCE
Status: COMPLETED | OUTPATIENT
Start: 2018-06-28 | End: 2018-06-28

## 2018-06-28 RX ORDER — VENLAFAXINE HYDROCHLORIDE 75 MG/1
75 CAPSULE, EXTENDED RELEASE ORAL DAILY
Qty: 30 CAPSULE | Refills: 1 | Status: SHIPPED | OUTPATIENT
Start: 2018-06-28 | End: 2018-07-27 | Stop reason: SDUPTHER

## 2018-06-28 RX ADMIN — CLONIDINE HYDROCHLORIDE 0.2 MG: 0.1 TABLET ORAL at 14:03

## 2018-06-29 ENCOUNTER — HOSPITAL ENCOUNTER (OUTPATIENT)
Dept: PREADMISSION TESTING | Age: 53
Discharge: HOME | End: 2018-07-03
Payer: MEDICAID

## 2018-06-29 VITALS — HEIGHT: 68 IN | WEIGHT: 220 LBS | BODY MASS INDEX: 33.34 KG/M2

## 2018-06-29 LAB
ANION GAP SERPL CALCULATED.3IONS-SCNC: 15 MMOL/L (ref 7–19)
BASOPHILS ABSOLUTE: 0.1 K/UL (ref 0–0.2)
BASOPHILS RELATIVE PERCENT: 0.9 % (ref 0–1)
BUN BLDV-MCNC: 17 MG/DL (ref 6–20)
CALCIUM SERPL-MCNC: 9.2 MG/DL (ref 8.6–10)
CHLORIDE BLD-SCNC: 103 MMOL/L (ref 98–111)
CO2: 22 MMOL/L (ref 22–29)
CREAT SERPL-MCNC: 1.6 MG/DL (ref 0.5–1.2)
EOSINOPHILS ABSOLUTE: 0.6 K/UL (ref 0–0.6)
EOSINOPHILS RELATIVE PERCENT: 6.7 % (ref 0–5)
GFR NON-AFRICAN AMERICAN: 46
GLUCOSE BLD-MCNC: 99 MG/DL (ref 74–109)
HCT VFR BLD CALC: 38.8 % (ref 42–52)
HEMOGLOBIN: 12.5 G/DL (ref 14–18)
LYMPHOCYTES ABSOLUTE: 2.9 K/UL (ref 1.1–4.5)
LYMPHOCYTES RELATIVE PERCENT: 33.8 % (ref 20–40)
MCH RBC QN AUTO: 29 PG (ref 27–31)
MCHC RBC AUTO-ENTMCNC: 32.2 G/DL (ref 33–37)
MCV RBC AUTO: 90 FL (ref 80–94)
MONOCYTES ABSOLUTE: 0.9 K/UL (ref 0–0.9)
MONOCYTES RELATIVE PERCENT: 10.8 % (ref 0–10)
NEUTROPHILS ABSOLUTE: 4 K/UL (ref 1.5–7.5)
NEUTROPHILS RELATIVE PERCENT: 47.4 % (ref 50–65)
PDW BLD-RTO: 13.6 % (ref 11.5–14.5)
PLATELET # BLD: 235 K/UL (ref 130–400)
PMV BLD AUTO: 9.6 FL (ref 9.4–12.4)
POTASSIUM SERPL-SCNC: 4.2 MMOL/L (ref 3.5–5)
RBC # BLD: 4.31 M/UL (ref 4.7–6.1)
SODIUM BLD-SCNC: 140 MMOL/L (ref 136–145)
WBC # BLD: 8.5 K/UL (ref 4.8–10.8)

## 2018-06-29 PROCEDURE — 85025 COMPLETE CBC W/AUTO DIFF WBC: CPT

## 2018-06-29 PROCEDURE — 80048 BASIC METABOLIC PNL TOTAL CA: CPT

## 2018-06-29 RX ORDER — LOSARTAN POTASSIUM 100 MG/1
50 TABLET ORAL DAILY
COMMUNITY

## 2018-06-29 RX ORDER — VENLAFAXINE HYDROCHLORIDE 75 MG/1
150 CAPSULE, EXTENDED RELEASE ORAL DAILY
COMMUNITY
End: 2020-03-13

## 2018-06-29 RX ORDER — SPIRONOLACTONE 25 MG/1
25 TABLET ORAL DAILY
COMMUNITY
End: 2020-03-13

## 2018-06-29 RX ORDER — OMEPRAZOLE 10 MG/1
10 CAPSULE, DELAYED RELEASE ORAL DAILY
COMMUNITY

## 2018-06-29 RX ORDER — CARVEDILOL 25 MG/1
25 TABLET ORAL 2 TIMES DAILY WITH MEALS
COMMUNITY
End: 2020-03-13

## 2018-06-29 RX ORDER — FENOFIBRATE 145 MG/1
145 TABLET, COATED ORAL NIGHTLY
COMMUNITY
End: 2020-03-13

## 2018-06-30 LAB
EKG P AXIS: -4 DEGREES
EKG P-R INTERVAL: 176 MS
EKG Q-T INTERVAL: 374 MS
EKG QRS DURATION: 90 MS
EKG QTC CALCULATION (BAZETT): 388 MS
EKG T AXIS: -2 DEGREES

## 2018-07-02 ENCOUNTER — ANESTHESIA EVENT (OUTPATIENT)
Dept: OPERATING ROOM | Age: 53
End: 2018-07-02
Payer: MEDICAID

## 2018-07-02 ENCOUNTER — HOSPITAL ENCOUNTER (OUTPATIENT)
Age: 53
Setting detail: OUTPATIENT SURGERY
Discharge: HOME | End: 2018-07-02
Attending: ORTHOPAEDIC SURGERY | Admitting: ORTHOPAEDIC SURGERY
Payer: MEDICAID

## 2018-07-02 ENCOUNTER — ANESTHESIA (OUTPATIENT)
Dept: OPERATING ROOM | Age: 53
End: 2018-07-02
Payer: MEDICAID

## 2018-07-02 VITALS
HEIGHT: 68 IN | DIASTOLIC BLOOD PRESSURE: 99 MMHG | WEIGHT: 216 LBS | SYSTOLIC BLOOD PRESSURE: 148 MMHG | HEART RATE: 55 BPM | TEMPERATURE: 96.9 F | RESPIRATION RATE: 14 BRPM | OXYGEN SATURATION: 93 % | BODY MASS INDEX: 32.74 KG/M2

## 2018-07-02 VITALS
TEMPERATURE: 97.7 F | DIASTOLIC BLOOD PRESSURE: 77 MMHG | OXYGEN SATURATION: 100 % | SYSTOLIC BLOOD PRESSURE: 107 MMHG | RESPIRATION RATE: 15 BRPM

## 2018-07-02 DIAGNOSIS — M25.312 SHOULDER INSTABILITY, LEFT: Primary | ICD-10-CM

## 2018-07-02 PROCEDURE — 2720000000 HC MISC SURG SUPPLY STERILE $0-50: Performed by: ORTHOPAEDIC SURGERY

## 2018-07-02 PROCEDURE — 6360000002 HC RX W HCPCS: Performed by: ORTHOPAEDIC SURGERY

## 2018-07-02 PROCEDURE — 3600000014 HC SURGERY LEVEL 4 ADDTL 15MIN: Performed by: ORTHOPAEDIC SURGERY

## 2018-07-02 PROCEDURE — 64415 NJX AA&/STRD BRCH PLXS IMG: CPT | Performed by: NURSE ANESTHETIST, CERTIFIED REGISTERED

## 2018-07-02 PROCEDURE — 6360000002 HC RX W HCPCS

## 2018-07-02 PROCEDURE — 7100000000 HC PACU RECOVERY - FIRST 15 MIN: Performed by: ORTHOPAEDIC SURGERY

## 2018-07-02 PROCEDURE — 6370000000 HC RX 637 (ALT 250 FOR IP): Performed by: ORTHOPAEDIC SURGERY

## 2018-07-02 PROCEDURE — 3700000000 HC ANESTHESIA ATTENDED CARE: Performed by: ORTHOPAEDIC SURGERY

## 2018-07-02 PROCEDURE — 7100000001 HC PACU RECOVERY - ADDTL 15 MIN: Performed by: ORTHOPAEDIC SURGERY

## 2018-07-02 PROCEDURE — 7100000010 HC PHASE II RECOVERY - FIRST 15 MIN: Performed by: ORTHOPAEDIC SURGERY

## 2018-07-02 PROCEDURE — 2500000003 HC RX 250 WO HCPCS: Performed by: NURSE ANESTHETIST, CERTIFIED REGISTERED

## 2018-07-02 PROCEDURE — 3600000004 HC SURGERY LEVEL 4 BASE: Performed by: ORTHOPAEDIC SURGERY

## 2018-07-02 PROCEDURE — 2580000003 HC RX 258: Performed by: ORTHOPAEDIC SURGERY

## 2018-07-02 PROCEDURE — 3700000001 HC ADD 15 MINUTES (ANESTHESIA): Performed by: ORTHOPAEDIC SURGERY

## 2018-07-02 PROCEDURE — 6360000002 HC RX W HCPCS: Performed by: NURSE ANESTHETIST, CERTIFIED REGISTERED

## 2018-07-02 PROCEDURE — C1776 JOINT DEVICE (IMPLANTABLE): HCPCS | Performed by: ORTHOPAEDIC SURGERY

## 2018-07-02 DEVICE — ANCHOR SUT DIA14MM 1 SFT SGL LD MB JUGGERKNOT: Type: IMPLANTABLE DEVICE | Site: SHOULDER | Status: FUNCTIONAL

## 2018-07-02 DEVICE — KIT INSTR 1.4MM CRV FLX DRL BIT OBT DISP FOR SFT ANCHR: Type: IMPLANTABLE DEVICE | Site: SHOULDER | Status: FUNCTIONAL

## 2018-07-02 RX ORDER — ROCURONIUM BROMIDE 10 MG/ML
INJECTION, SOLUTION INTRAVENOUS PRN
Status: DISCONTINUED | OUTPATIENT
Start: 2018-07-02 | End: 2018-07-02 | Stop reason: SDUPTHER

## 2018-07-02 RX ORDER — SODIUM CHLORIDE, SODIUM LACTATE, POTASSIUM CHLORIDE, CALCIUM CHLORIDE 600; 310; 30; 20 MG/100ML; MG/100ML; MG/100ML; MG/100ML
INJECTION, SOLUTION INTRAVENOUS CONTINUOUS
Status: DISCONTINUED | OUTPATIENT
Start: 2018-07-02 | End: 2018-07-02 | Stop reason: HOSPADM

## 2018-07-02 RX ORDER — LABETALOL HYDROCHLORIDE 5 MG/ML
5 INJECTION, SOLUTION INTRAVENOUS EVERY 10 MIN PRN
Status: DISCONTINUED | OUTPATIENT
Start: 2018-07-02 | End: 2018-07-02 | Stop reason: HOSPADM

## 2018-07-02 RX ORDER — MEPERIDINE HYDROCHLORIDE 50 MG/ML
12.5 INJECTION INTRAMUSCULAR; INTRAVENOUS; SUBCUTANEOUS EVERY 5 MIN PRN
Status: DISCONTINUED | OUTPATIENT
Start: 2018-07-02 | End: 2018-07-02 | Stop reason: HOSPADM

## 2018-07-02 RX ORDER — MORPHINE SULFATE 1 MG/ML
2 INJECTION, SOLUTION EPIDURAL; INTRATHECAL; INTRAVENOUS EVERY 5 MIN PRN
Status: DISCONTINUED | OUTPATIENT
Start: 2018-07-02 | End: 2018-07-02 | Stop reason: HOSPADM

## 2018-07-02 RX ORDER — EPHEDRINE SULFATE 50 MG/ML
INJECTION, SOLUTION INTRAVENOUS PRN
Status: DISCONTINUED | OUTPATIENT
Start: 2018-07-02 | End: 2018-07-02 | Stop reason: SDUPTHER

## 2018-07-02 RX ORDER — ROPIVACAINE HYDROCHLORIDE 5 MG/ML
INJECTION, SOLUTION EPIDURAL; INFILTRATION; PERINEURAL PRN
Status: DISCONTINUED | OUTPATIENT
Start: 2018-07-02 | End: 2018-07-02 | Stop reason: SDUPTHER

## 2018-07-02 RX ORDER — PROMETHAZINE HYDROCHLORIDE 25 MG/ML
6.25 INJECTION, SOLUTION INTRAMUSCULAR; INTRAVENOUS
Status: DISCONTINUED | OUTPATIENT
Start: 2018-07-02 | End: 2018-07-02 | Stop reason: HOSPADM

## 2018-07-02 RX ORDER — FENTANYL CITRATE 50 UG/ML
INJECTION, SOLUTION INTRAMUSCULAR; INTRAVENOUS
Status: COMPLETED
Start: 2018-07-02 | End: 2018-07-02

## 2018-07-02 RX ORDER — HYDROMORPHONE HCL IN 0.9% NACL 0.5 MG/ML
0.5 SYRINGE (ML) INTRAVENOUS EVERY 5 MIN PRN
Status: DISCONTINUED | OUTPATIENT
Start: 2018-07-02 | End: 2018-07-02 | Stop reason: HOSPADM

## 2018-07-02 RX ORDER — OXYCODONE HYDROCHLORIDE AND ACETAMINOPHEN 5; 325 MG/1; MG/1
2 TABLET ORAL ONCE
Status: COMPLETED | OUTPATIENT
Start: 2018-07-02 | End: 2018-07-02

## 2018-07-02 RX ORDER — ONDANSETRON 2 MG/ML
INJECTION INTRAMUSCULAR; INTRAVENOUS PRN
Status: DISCONTINUED | OUTPATIENT
Start: 2018-07-02 | End: 2018-07-02 | Stop reason: SDUPTHER

## 2018-07-02 RX ORDER — DEXAMETHASONE SODIUM PHOSPHATE 10 MG/ML
INJECTION INTRAMUSCULAR; INTRAVENOUS PRN
Status: DISCONTINUED | OUTPATIENT
Start: 2018-07-02 | End: 2018-07-02 | Stop reason: SDUPTHER

## 2018-07-02 RX ORDER — MORPHINE SULFATE 1 MG/ML
4 INJECTION, SOLUTION EPIDURAL; INTRATHECAL; INTRAVENOUS EVERY 5 MIN PRN
Status: DISCONTINUED | OUTPATIENT
Start: 2018-07-02 | End: 2018-07-02 | Stop reason: HOSPADM

## 2018-07-02 RX ORDER — MIDAZOLAM HYDROCHLORIDE 1 MG/ML
INJECTION INTRAMUSCULAR; INTRAVENOUS
Status: COMPLETED
Start: 2018-07-02 | End: 2018-07-02

## 2018-07-02 RX ORDER — METOCLOPRAMIDE HYDROCHLORIDE 5 MG/ML
10 INJECTION INTRAMUSCULAR; INTRAVENOUS
Status: DISCONTINUED | OUTPATIENT
Start: 2018-07-02 | End: 2018-07-02 | Stop reason: HOSPADM

## 2018-07-02 RX ORDER — OXYCODONE AND ACETAMINOPHEN 10; 325 MG/1; MG/1
1 TABLET ORAL EVERY 6 HOURS PRN
Qty: 40 TABLET | Refills: 0 | Status: SHIPPED | OUTPATIENT
Start: 2018-07-02 | End: 2018-07-09

## 2018-07-02 RX ORDER — HYDRALAZINE HYDROCHLORIDE 20 MG/ML
5 INJECTION INTRAMUSCULAR; INTRAVENOUS EVERY 10 MIN PRN
Status: DISCONTINUED | OUTPATIENT
Start: 2018-07-02 | End: 2018-07-02 | Stop reason: HOSPADM

## 2018-07-02 RX ORDER — FENTANYL CITRATE 50 UG/ML
INJECTION, SOLUTION INTRAMUSCULAR; INTRAVENOUS PRN
Status: DISCONTINUED | OUTPATIENT
Start: 2018-07-02 | End: 2018-07-02 | Stop reason: SDUPTHER

## 2018-07-02 RX ORDER — PROPOFOL 10 MG/ML
INJECTION, EMULSION INTRAVENOUS PRN
Status: DISCONTINUED | OUTPATIENT
Start: 2018-07-02 | End: 2018-07-02 | Stop reason: SDUPTHER

## 2018-07-02 RX ORDER — LIDOCAINE HYDROCHLORIDE 10 MG/ML
INJECTION, SOLUTION EPIDURAL; INFILTRATION; INTRACAUDAL; PERINEURAL PRN
Status: DISCONTINUED | OUTPATIENT
Start: 2018-07-02 | End: 2018-07-02 | Stop reason: SDUPTHER

## 2018-07-02 RX ORDER — HYDROMORPHONE HCL IN 0.9% NACL 0.5 MG/ML
0.25 SYRINGE (ML) INTRAVENOUS EVERY 5 MIN PRN
Status: DISCONTINUED | OUTPATIENT
Start: 2018-07-02 | End: 2018-07-02 | Stop reason: HOSPADM

## 2018-07-02 RX ORDER — GLYCOPYRROLATE 0.2 MG/ML
INJECTION INTRAMUSCULAR; INTRAVENOUS PRN
Status: DISCONTINUED | OUTPATIENT
Start: 2018-07-02 | End: 2018-07-02 | Stop reason: SDUPTHER

## 2018-07-02 RX ORDER — DIPHENHYDRAMINE HYDROCHLORIDE 50 MG/ML
12.5 INJECTION INTRAMUSCULAR; INTRAVENOUS
Status: DISCONTINUED | OUTPATIENT
Start: 2018-07-02 | End: 2018-07-02 | Stop reason: HOSPADM

## 2018-07-02 RX ORDER — ONDANSETRON 4 MG/1
4 TABLET, FILM COATED ORAL DAILY PRN
Qty: 20 TABLET | Refills: 0 | Status: SHIPPED | OUTPATIENT
Start: 2018-07-02 | End: 2019-11-11

## 2018-07-02 RX ADMIN — DEXAMETHASONE SODIUM PHOSPHATE 10 MG: 10 INJECTION INTRAMUSCULAR; INTRAVENOUS at 09:41

## 2018-07-02 RX ADMIN — ROPIVACAINE HYDROCHLORIDE 20 ML: 5 INJECTION, SOLUTION EPIDURAL; INFILTRATION; PERINEURAL at 09:23

## 2018-07-02 RX ADMIN — SODIUM CHLORIDE, SODIUM LACTATE, POTASSIUM CHLORIDE, AND CALCIUM CHLORIDE: 600; 310; 30; 20 INJECTION, SOLUTION INTRAVENOUS at 10:04

## 2018-07-02 RX ADMIN — Medication 2 G: at 09:48

## 2018-07-02 RX ADMIN — OXYCODONE HYDROCHLORIDE AND ACETAMINOPHEN 2 TABLET: 5; 325 TABLET ORAL at 12:25

## 2018-07-02 RX ADMIN — LIDOCAINE HYDROCHLORIDE 5 ML: 10 INJECTION, SOLUTION EPIDURAL; INFILTRATION; INTRACAUDAL; PERINEURAL at 09:31

## 2018-07-02 RX ADMIN — ROCURONIUM BROMIDE 40 MG: 10 INJECTION INTRAVENOUS at 09:31

## 2018-07-02 RX ADMIN — Medication 4 MG: at 11:41

## 2018-07-02 RX ADMIN — EPHEDRINE SULFATE 20 MG: 50 INJECTION, SOLUTION INTRAMUSCULAR; INTRAVENOUS; SUBCUTANEOUS at 09:48

## 2018-07-02 RX ADMIN — PROPOFOL 200 MG: 10 INJECTION, EMULSION INTRAVENOUS at 09:31

## 2018-07-02 RX ADMIN — MIDAZOLAM 2 MG: 1 INJECTION INTRAMUSCULAR; INTRAVENOUS at 09:18

## 2018-07-02 RX ADMIN — FENTANYL CITRATE 100 MCG: 50 INJECTION, SOLUTION INTRAMUSCULAR; INTRAVENOUS at 09:31

## 2018-07-02 RX ADMIN — GLYCOPYRROLATE 0.4 MG: 0.2 INJECTION, SOLUTION INTRAMUSCULAR; INTRAVENOUS at 11:09

## 2018-07-02 RX ADMIN — ONDANSETRON HYDROCHLORIDE 4 MG: 2 SOLUTION INTRAMUSCULAR; INTRAVENOUS at 09:41

## 2018-07-02 RX ADMIN — EPHEDRINE SULFATE 20 MG: 50 INJECTION, SOLUTION INTRAMUSCULAR; INTRAVENOUS; SUBCUTANEOUS at 09:57

## 2018-07-02 RX ADMIN — SODIUM CHLORIDE, SODIUM LACTATE, POTASSIUM CHLORIDE, AND CALCIUM CHLORIDE: 600; 310; 30; 20 INJECTION, SOLUTION INTRAVENOUS at 09:09

## 2018-07-02 RX ADMIN — NEOSTIGMINE METHYLSULFATE 3 MG: 1 INJECTION, SOLUTION INTRAMUSCULAR; INTRAVENOUS; SUBCUTANEOUS at 11:09

## 2018-07-02 ASSESSMENT — PAIN DESCRIPTION - LOCATION: LOCATION: SHOULDER

## 2018-07-02 ASSESSMENT — PAIN SCALES - GENERAL
PAINLEVEL_OUTOF10: 7
PAINLEVEL_OUTOF10: 6
PAINLEVEL_OUTOF10: 7
PAINLEVEL_OUTOF10: 7

## 2018-07-02 ASSESSMENT — PAIN DESCRIPTION - DESCRIPTORS: DESCRIPTORS: ACHING;SHARP

## 2018-07-02 ASSESSMENT — PAIN DESCRIPTION - FREQUENCY: FREQUENCY: CONTINUOUS

## 2018-07-02 ASSESSMENT — LIFESTYLE VARIABLES: SMOKING_STATUS: 0

## 2018-07-02 ASSESSMENT — PAIN DESCRIPTION - ORIENTATION: ORIENTATION: LEFT

## 2018-07-02 NOTE — ANESTHESIA PRE PROCEDURE
Department of Anesthesiology  Preprocedure Note       Name:  Louis Ferrell   Age:  47 y.o.  :  1965                                          MRN:  [id number]         Date:  2018      Surgeon: Abdulkadir Merino):  Eliezer Hartman MD    Procedure: Procedure(s):  SHOULDER ARTHROSCOPIC BANKART REPAIR    Medications prior to admission:   Prior to Admission medications    Medication Sig Start Date End Date Taking? Authorizing Provider   oxyCODONE-acetaminophen (PERCOCET)  MG per tablet Take 1 tablet by mouth every 6 hours as needed for Pain for up to 7 days. . 18 Yes Eliezer Hartman MD   ondansetron [facility]) 4 MG tablet Take 1 tablet by mouth daily as needed for Nausea or Vomiting 18  Yes Eliezer Hartman MD   spironolactone (ALDACTONE) 25 MG tablet Take 25 mg by mouth daily    Historical Provider, MD   carvedilol (COREG) 25 MG tablet Take 25 mg by mouth 2 times daily (with meals)    Historical Provider, MD   omeprazole (PRILOSEC) 10 MG delayed release capsule Take 10 mg by mouth daily    Historical Provider, MD   losartan (COZAAR) 100 MG tablet Take 100 mg by mouth daily    Historical Provider, MD   fenofibrate (TRICOR) 145 MG tablet Take 145 mg by mouth nightly    Historical Provider, MD   venlafaxine (EFFEXOR XR) 75 MG extended release capsule Take 75 mg by mouth daily    Historical Provider, MD       Current medications:    No current facility-administered medications for this encounter. Allergies: Allergies   Allergen Reactions    Lisinopril Swelling     BRAIN SWELLING/COMA    Lipitor [Atorvastatin] Rash       Problem List:  There is no problem list on file for this patient.       Past Medical History:        Diagnosis Date    GERD (gastroesophageal reflux disease)     Hyperlipidemia     Hypertension     Seizure ([facility] [address] 2017       Past Surgical History:        Procedure Laterality Date    COLONOSCOPY      TRACHEOTOMY      NOW CLOSED       Social History:    Social History   Substance Use Topics    Smoking status: Former Smoker     Packs/day: 0.50     Years: 20.00     Types: Cigarettes     Quit date: 6/29/2015    Smokeless tobacco: Current User    Alcohol use Yes      Comment: OCC                                Ready to quit: Not Answered  Counseling given: Not Answered      Vital Signs (Current): There were no vitals filed for this visit. BP Readings from Last 3 Encounters:   No data found for BP       NPO Status:                                                                                 BMI:   Wt Readings from Last 3 Encounters:   06/29/18 220 lb (99.8 kg)     There is no height or weight on file to calculate BMI.    CBC:   Lab Results   Component Value Date    WBC 8.5 06/29/2018    RBC 4.31 06/29/2018    HGB 12.5 06/29/2018    HCT 38.8 06/29/2018    MCV 90.0 06/29/2018    RDW 13.6 06/29/2018     06/29/2018       CMP:   Lab Results   Component Value Date     06/29/2018    K 4.2 06/29/2018     06/29/2018    CO2 22 06/29/2018    BUN 17 06/29/2018    CREATININE 1.6 06/29/2018    LABGLOM 46 06/29/2018    GLUCOSE 99 06/29/2018    CALCIUM 9.2 06/29/2018       POC Tests: No results for input(s): POCGLU, POCNA, POCK, POCCL, POCBUN, POCHEMO, POCHCT in the last 72 hours.     Coags: No results found for: PROTIME, INR, APTT    HCG (If Applicable): No results found for: PREGTESTUR, PREGSERUM, HCG, HCGQUANT     ABGs: No results found for: PHART, PO2ART, SBP8WXV, KUU3ONN, BEART, C4ZJTNRG     Type & Screen (If Applicable):  No results found for: [facility]    Anesthesia Evaluation  Patient summary reviewed and Nursing notes reviewed no history of anesthetic complications:   Airway: Mallampati: II        Dental:          Pulmonary:       (-) not a current smoker                           Cardiovascular:    (+) hypertension: no interval change,       ECG reviewed               Beta Blocker:  Dose within 24 Hrs         Neuro/Psych:   (+) seizures: well controlled and no interval change,             GI/Hepatic/Renal:   (+) GERD: no interval change, morbid obesity          Endo/Other:              Pt had PAT visit. Abdominal:           Vascular: negative vascular ROS. Anesthesia Plan      general and regional     ASA 3     (Interscalene block)      MIPS: Postoperative opioids intended and Prophylactic antiemetics administered. Anesthetic plan and risks discussed with patient.                       VLADIMIR Gaffney - CRNA   7/2/2018

## 2018-07-02 NOTE — H&P
Pt Name: Marti Mcfarlane  MRN: [id number]  YOB: 1965  Date: 7/1/2018      HPI: 47y.o. year old male with chronic pain of the left shoulder. Anterior labral tear seen on MRI. Chronic pain has been non responsive to conservative treatments, not limited to, NSAIDs, corticosteroid injections, and physical therapy. Past Medical/Surgical History:   Past Medical History:   Diagnosis Date    GERD (gastroesophageal reflux disease)     Hyperlipidemia     Hypertension     Seizure ([address] 07/03/2017     Past Surgical History:   Procedure Laterality Date    COLONOSCOPY      TRACHEOTOMY      NOW CLOSED         Social History:    Smoking:  No Smoking History = 0   Alcohol:complete abstinence from alcohol    Medications:   Prior to Admission medications    Medication Sig Start Date End Date Taking? Authorizing Provider   spironolactone (ALDACTONE) 25 MG tablet Take 25 mg by mouth daily    Historical Provider, MD   carvedilol (COREG) 25 MG tablet Take 25 mg by mouth 2 times daily (with meals)    Historical Provider, MD   omeprazole (PRILOSEC) 10 MG delayed release capsule Take 10 mg by mouth daily    Historical Provider, MD   losartan (COZAAR) 100 MG tablet Take 100 mg by mouth daily    Historical Provider, MD   fenofibrate (TRICOR) 145 MG tablet Take 145 mg by mouth nightly    Historical Provider, MD   venlafaxine (EFFEXOR XR) 75 MG extended release capsule Take 75 mg by mouth daily    Historical Provider, MD       Allergies: Allergies   Allergen Reactions    Lisinopril Swelling     BRAIN SWELLING/COMA    Lipitor [Atorvastatin] Rash       Review of systems:     * Constitutional: negative     * HEENT: negative     * Skin: negative     * Cardiac: negative     * Respiratory: negative     * GI: negative     * : negative     * Neuro: negative     * CNS: negative     * Extremities: negative     * Endcrine: negative     Physical Exam:   There were no vitals taken for this visit.     - General: normal development and appearance     - HEENT: normocephalic, RAQUEL     - Skin: pink, warm, normal tone     - Neck: supple, no masses,     - Chest: no rales or wheezes, normal expansion     - Heart: RRR, no murmurs/gallops     - Abdomen: soft, nondistended, nontender, normal sounds     - Vascular: normal pulses, color, tone     - Pysch/Neuro: normal affect, mood, alert and oriented     - Musculoskeletal: Physical exam of the patient's left shoulder shows the skin is clean, dry, and intact. No deformities, lesions, or errythema noted. NVI distally and laterally. Tenderness to palpation anteriorlly. Decreased range of motion and weakness secondary to pain. Impression: Bankart tear of the left shoulder. Surgical Plan: Arthroscopic Bankart repair of the left shoulder.       Electronically signed by Carrie Power PA-C on 7/1/2018 at 9:07 PM

## 2018-07-03 RX ORDER — FUROSEMIDE 20 MG/1
20 TABLET ORAL DAILY
Qty: 30 TABLET | Refills: 0 | Status: SHIPPED | OUTPATIENT
Start: 2018-07-03 | End: 2018-11-20 | Stop reason: SDUPTHER

## 2018-07-03 NOTE — TELEPHONE ENCOUNTER
Patient was last given this rx on 05/11/18 he is having a lot of bilateral lower leg edema and is out of this rx please review and send to pharmacy

## 2018-07-03 NOTE — OP NOTE
[facility] [address] [facility]                                 OPERATIVE REPORT    PATIENT NAME: Mehrdad Franklin                    :        1965  MED REC NO:   [id number]                              ROOM:  ACCOUNT NO:   [de-identified]                           ADMIT DATE: 2018  PROVIDER:     Heather Morris MD      DATE OF PROCEDURE:  2018    PREOPERATIVE DIAGNOSIS:  Left shoulder anteroinferior labral tear (Bankart  tear). POSTOPERATIVE DIAGNOSES:  1. Left shoulder anteroinferior labral tear (Bankart tear). 2.  Left shoulder posteroinferior labral tear (reverse Bankart tear). OPERATION PERFORMED:  1. Left shoulder arthroscopic anteroinferior labral repair (Bankart  repair). 2.  Left shoulder arthroscopic posteroinferior labral repair (posterior  Bankart repair). SURGEON:  Heather Morris MD    ASSISTANT:  Cletis Phoenix, PA-C. The assistant surgeon was scrubbed and  present throughout the entire procedure. The assistant aided in limb  positioning as well as management of the arthroscopic camera. He was also  responsible for wound closure. ANESTHESIA:  General endotracheal anesthesia. ESTIMATED BLOOD LOSS:  Minimal.    COMPLICATIONS:  None. CONDITION:  Stable. BRIEF HISTORY:  This is a 56-year-old male who presented to outpatient  clinic with complaints of recurrent left shoulder instability. The patient  described multiple dislocations. MRI showed an anteroinferior labral tear. Based on this, we elected to take the patient to the operating room for the  above-mentioned procedure. After the risks and benefits had been discussed  with the patient, he agreed to proceed. OPERATIVE PROCEDURE:  The patient was interviewed in the preanesthesia area  where his left shoulder was marked with a marking pen.   He was then  administered an ultrasound-guided regional block per the anesthesia team.   The patient was to 7 o'clock  position. The Spectrum suture device was used to advance the capsule up  and then passed beneath the labrum creating an advancement. The suture was  then tied with an [facility] sliding knot back to about three half-hitches. Once  this was tied, the humeral head did center well in the glenoid. We then  went ahead and placed two more sutures in a similar fashion grabbing the  capsular tissue with the labrum. Attention was then turned to the posterior labral tear, where two [employer]  JuggerKnot anchors were placed through the port of [place]. Once again,  a [employer] suture passer was used to shuttle sutures and these two were  tied with [facility] sliding knots back to about three half-hitches  reapproximating the posterior labral tear. Once this was complete, images  were captured of the humeral head at the center of the glenoid. The scope  was withdrawn from the shoulder. The portal sites were closed with 3-0  nylon suture. The patient awoke from anesthesia without difficulty and was  transferred to the PACU in stable condition. All sponge, needle, and  instrument counts were correct at the end of the procedure.         Merlene Mckeon MD    D: 07/02/2018 14:55:13      T: 07/02/2018 17:49:59     [id number]  Job#: [id number]     Doc#: [id number]    CC:

## 2018-07-27 ENCOUNTER — OFFICE VISIT (OUTPATIENT)
Dept: FAMILY MEDICINE CLINIC | Facility: CLINIC | Age: 53
End: 2018-07-27

## 2018-07-27 VITALS
BODY MASS INDEX: 33.28 KG/M2 | WEIGHT: 219.6 LBS | HEIGHT: 68 IN | OXYGEN SATURATION: 98 % | TEMPERATURE: 98.6 F | DIASTOLIC BLOOD PRESSURE: 76 MMHG | HEART RATE: 76 BPM | SYSTOLIC BLOOD PRESSURE: 138 MMHG | RESPIRATION RATE: 18 BRPM

## 2018-07-27 DIAGNOSIS — I10 ESSENTIAL HYPERTENSION: ICD-10-CM

## 2018-07-27 DIAGNOSIS — Z98.890 S/P SHOULDER SURGERY: Primary | ICD-10-CM

## 2018-07-27 DIAGNOSIS — F33.1 MODERATE EPISODE OF RECURRENT MAJOR DEPRESSIVE DISORDER (HCC): ICD-10-CM

## 2018-07-27 DIAGNOSIS — M1A.09X0 CHRONIC GOUT OF MULTIPLE SITES, UNSPECIFIED CAUSE: ICD-10-CM

## 2018-07-27 PROCEDURE — 99214 OFFICE O/P EST MOD 30 MIN: CPT | Performed by: NURSE PRACTITIONER

## 2018-07-27 RX ORDER — COLCHICINE 0.6 MG/1
TABLET ORAL
Qty: 30 TABLET | Refills: 2 | Status: SHIPPED | OUTPATIENT
Start: 2018-07-27 | End: 2018-11-20 | Stop reason: SDUPTHER

## 2018-07-27 RX ORDER — CARVEDILOL 25 MG/1
25 TABLET ORAL 2 TIMES DAILY WITH MEALS
Qty: 180 TABLET | Refills: 1 | Status: SHIPPED | OUTPATIENT
Start: 2018-07-27 | End: 2018-11-20 | Stop reason: SDUPTHER

## 2018-07-27 RX ORDER — VENLAFAXINE HYDROCHLORIDE 150 MG/1
150 CAPSULE, EXTENDED RELEASE ORAL DAILY
Qty: 90 CAPSULE | Refills: 1 | Status: SHIPPED | OUTPATIENT
Start: 2018-07-27 | End: 2018-11-20 | Stop reason: SDUPTHER

## 2018-07-27 NOTE — PROGRESS NOTES
Chief Complaint   Patient presents with   • Anxiety     Pt states he is better than he was.            HPI  Lukasz Savage is a 53 y.o. male presents today for follow up of HTN, depression and gout.  He recently had a left shoulder scope and is doing very well.  Rates pain in shoulder 2/10    HTN  Takes medications as prescribed without missed doses, reports no episodes of hypotension or any additional adverse effects from medications(s); Patient reports BP's at home are normal.  Denies chest pain/chest pressure or discomfort, shortness of breath with exertion, headaches, palpitations, irregular heart beat, tachycardia, lower extremity edema, orthopnea, near-syncope. Says that his depression is improved but still feels like there is room or employment.      Chronic problems:  HTN stable with carvediolol, clonidine, spironlactone and losartan.  Hyperlipidemia stable with fenofibrate and omega 3 fatty acids,  GERD stable with omeprazole, ED stable with vardenafi,  Gout multiple joints stable with colchicine, depression/anxiety stable with quetiapine, arthritis/RA stable with otc tylenol, and sleep apnea stable with CPAP. Posterior reversible encephalopathy syndrome on MRI. Tongue bite with sublingual hematoma, much improved.  Tobacco abuse.  Obstructive sleep apnea, noncompliant with device.  Daily alcohol use with recent Delirium tremens requiring sedation and mechanical ventilation. 9. Hypokalemia, replaced.   Clostridium difficile colitis.  Possible lingual or sublingual infection, MSSA on culture.  Bilateral lower lobe infiltrates consistent with ventilator associated pneumonia combined with atelectasis.  Acute gouty arthritis of the left great toe.    PCP:  Linda Hoffman, DNP, APRN    Allergies   Allergen Reactions   • Lipitor [Atorvastatin] Rash   • Lisinopril Angioedema       Past Medical History:   Diagnosis Date   • Angio-edema 7/3/2017   • Anxiety    • Arthritis    • Clostridium difficile colitis     • Erectile dysfunction 10/6/2016   • Gout    • HCAP (healthcare-associated pneumonia) 7/11/2017   • Hyperlipidemia    • Malignant hypertension    • MSSA (methicillin susceptible Staphylococcus aureus) infection 7/31/2017   • Obstructive sleep apnea    • Seizures (CMS/HCC) 7/4/2017       Past Surgical History:   Procedure Laterality Date   • TRACHEOSTOMY N/A 7/12/2017    Procedure: TRACHEOSTOMY WITH TRACHEOSCOPY;  Surgeon: Jairon Pierson MD;  Location: [facility] OR;  Service:        Social History     Social History   • Marital status: Single     Social History Main Topics   • Smoking status: Former Smoker     Packs/day: 0.33     Years: 30.00     Types: Cigarettes     Quit date: 7/3/2017   • Smokeless tobacco: Never Used   • Alcohol use No      Comment: PER DAY FOR 30 YEARS; Last drink July 3, 2017   • Drug use: No   • Sexual activity: Defer     Other Topics Concern   • Not on file       Family History   Problem Relation Age of Onset   • Hypertension Mother    • Diabetes Mother    • Heart disease Father    • Hypertension Father    • No Known Problems Daughter    • No Known Problems Son    • Diabetes Maternal Grandmother    • No Known Problems Maternal Grandfather    • No Known Problems Paternal Grandmother    • Heart attack Paternal Grandfather    • Kidney disease Sister        Current Outpatient Prescriptions on File Prior to Visit   Medication Sig Dispense Refill   • CloNIDine (CATAPRES) 0.1 MG tablet Take 1 tablet by mouth Every Night. Check BP 4x daily. If> 180 SBP take x1. 60 tablet 2   • fenofibrate (TRICOR) 145 MG tablet Take 1 tablet by mouth Daily. 90 tablet 1   • furosemide (LASIX) 20 MG tablet Take 1 tablet by mouth Daily. 30 tablet 0   • losartan (COZAAR) 100 MG tablet Take 1 tablet by mouth Daily. For high blood pressure 90 tablet 1   • Omega-3 Fatty Acids (FISH OIL) 1000 MG capsule capsule Take 2 capsules by mouth Daily With Breakfast. 180 capsule 1   • omeprazole (PRILOSEC) 10 MG capsule Take 1 capsule  "by mouth Daily. For acid reflux 90 capsule 1   • spironolactone (ALDACTONE) 25 MG tablet Take 1 tablet by mouth Daily. 90 tablet 1   • vardenafil (LEVITRA) 10 MG tablet Take 1 tablet by mouth Daily As Needed for erectile dysfunction. 10 tablet 5   • [DISCONTINUED] carvedilol (COREG) 25 MG tablet Take 1 tablet by mouth 2 (Two) Times a Day With Meals. 180 tablet 1   • [DISCONTINUED] colchicine 0.6 MG tablet Take 2 tabs now, repeat 1 tab in an hour.  May repeat same steps again in 24 hours if needed.  He has chronic gout 30 tablet 2   • [DISCONTINUED] venlafaxine XR (EFFEXOR-XR) 75 MG 24 hr capsule Take 1 capsule by mouth Daily. 30 capsule 1     No current facility-administered medications on file prior to visit.         REVIEW OF SYMPTOMS: (Positives bolded)  General:  weight loss, fever, chills, night sweats, fatigue, appetite loss  HEENT:  blurry vision, eye pain, eye discharge, dry eyes, decreased vision, sore throat tinnitus, bloody nose, hearin gloss, sinus pain/pressure, ear pain/pressure.   Respiratory: shortness of breath, cough, hemoptysis, wheezing, pleurisy,   Cardiovascular:  chest pain, PND, palpitation, edema, orthopnea, syncope, swelling of extremities  Gastro: Nausea, vomiting, diarrhea, hematemesis, abdominal pain, constipation  Genito: hematuria, dysuria, glycosuria, hesitancy, frequency, incontinence  Musckelo: Arthralgia, myalgia, muscle weakness, joint swelling, NSAID use, Recently had left shoulder surgery  Skin: rash, pruritis, sores, nail changes, skin thickening, change in wart/mole, itching, rash, new lesions, pruritus, nail changes  Neuro:  Migraine, numbness, ataxia, tremor, vertigo, weakness, memory loss  \"All other systems reviewed and negative, except as listed above.”      OBJECTIVE:  Constitutional:  Appearance-No acute distress, Consistent with stated age. Orientation- Oriented x 3, alert Posture-Not doubled over. Gait-Normal pace, normal arm movement. Posture- Normal Build and " Nutrition-Well developed and well nourished.  General- Patient is pleasant and cooperative with the interview and exam.    Integumentary: General-No rashes, ulcers or lesions. No edema.  Palpation- Normal skin moisture/turgor. Skin is warm to touch, no increased warmth. Capillary refill is normal bilateral Upper and lower extremity.     Head/Neck: Head- normocephalic and atraumatic.  Neck- without visible/palpable lumps or pulsations.  Palpation- No bony tenderness about head/neck along frontal, occiptial, temporal, parietal, mastoid, jawline, zygoma, orbit or any other location.  NO temporal artery tenderness. No TMJ tenderness. Normal cervical ROM.   Neck Supple.  Thyroid-No thyromegaly, no nodules    Eye: Bilaterally PERRLA, EOMI.  No discharge.  Upper and lower eyelids are normal. Sclera/conjunctiva normal without discharge. Cornea is normal and clear. Lens is normal.  Eyeball appears normal. No ciliary flushing, no conjunctival injection.    ENMT:  Pinna- normal without tenderness or erythema.  External auditory canal Left- normal without erythema or discharge, no excessive cerumen. External auditory canal Right-normal without erythema or discharge, no excessive cerumen. TM left- Grey/pearly, normal light reflex and anatomy TM Right- Grey/pearly, normal light reflex and anatomy Hearing Assessment-normal to conversational speech at 2-5 feet.  Nose and sinus-No sinus tenderness along frontal/maxillary region. External appearance normal and midline. Nares- bilateral quiet airflow, no discharge. Nasal mucosa- No bleeding noted and no ulcerations observed. Pink, moist. Turbinates non boggy. Lips- normal color, moist without cracks/lesions Oral Cavity/Palate- hard/soft palate intact without lesions, oral mucosa pink and moist. Dentition assessed and discussed appropriate oral care. Tongue normal midline.  Oropharynx- no pharyngeal erythema, Uvula midline. No post nasal drip. No exudate. Salivary glands- Non tender to  palpation    CHEST/LUNG: Inspection- symmetric chest wall no pectus deformity. Normal effort, no distress, no use of accessory muscles. Palpation- nontender sternum, ribline.  No abnormal pulsations. Auscultation- Breath sounds normal throughout all lung fields.  Normal tracheal sounds, Normal bronchial sounds overlying sternum, Bronchovessicular sounds normal between scapulae posteriorly, Normal vessicular breath sounds heard throughout periphery. Lungs are clear today. Adventitious sounds- No wheezes, rales, rhonchi.     CARDIOVASCULAR:  Carotid artery- normal, no bruits or abnormal pulsations. Jugular vein- no pulsations. Palpation/Percussion- Normal PMI, no palpable thrill  Auscultation- Regular rate and rhythm. No murmur noted in sitting, supine positions. Extremities- no digital clubbing, cyanosis, edema, increased warmth.    ABDOMEN: Inspection- normal and no visible pulsations. Normal contour. Auscultation- Bowel sounds normal, no abdominal bruits. Palpation/Percussion- soft, non-tender, no rebound tenderness, no rigidity (guarding), no jar tenderness, no masses.  Liver-no hepatomegaly, Spleen - no splenomegaly, Hernias- none. Rectal not examined.     Peripheral Vascular: Upper extremity Left- Normal temperature with pink nailbeds and no ulcerations.  Upper extremity Right- Normal temperature with pink nailbeds and no ulcerations.  Lower extremity- Normal temperature with pink nailbeds and no ulcerations. DP pulses 2+ bilaterally.  Pedal hair intact.  Normal capillary refill. Edema- No edema.    Musculoskeletal: Generalized-No generalized swelling or edema of extremities, no digital clubbing or cyanosis, neurovascularly intact all four extremities.  L Upper extremity- Symmetrical posture.  No visible deformity.  Normal sensation along medial and lateral upper extremity proximally and distally.  Is in a sling s/p shoulder scope.  Has tenderness overlying the whole shoulder, Surgical site is well healed with  good approximation of edges, no redness or drainage.  5/5 and strength 5/5 bilateral UE.  Elbow palpated, no tenderness overlying olecranon.  Normal supination, pronation to active/passive ROM and to resisted rotation. Bicep insertion/tricep insertion appear normal without obvious pathology. Rotator cuff evaluated and intact.  Normal wrist ROM bilaterally. Normal hand movement, intrinsic muscles of hands normal. No tenderness to palpation of hands/wrists/elbows.  Lower extremity- Not tender to palpation, no pain, no swelling, edema or erythema of surrounding tissue, normal strength and tone.  Normal appearing hip ROM bilaterally without pain.  Knee ROM normal at 0-120 degrees. No tenderness overlying trochanters, no tenderness about patella, quad tendon, patellar tendon.  No tenderness at tibial tuberosity. Ankle normal ROM not tender to palpation along medial/lateral malleolus. Normal movement of toes, no tenderness bilateral feet/toes.  Normal foot type. Calves symmetrical.  Stretching demonstrated today.  Spine/Ribs- No deformities, masses or tenderness, no known fractures, normal strength, Normal ROM. Normal stability No tenderness along C/T/L spine.  Normal appearing ROM about spine.      Neurological: General- Moves all 4 extremities symmetrically. Symmetrical face and body posture. Cranial nerves- individually evaluated II-XII and intact. PERRLA, Normal EOMI, visual/special senses appear intact, Face is symmetrical and normal sensation/movement, normal tongue, normal strength/posture of neck musculature. Balance- Romberg intact.  Reflexes- ntact with DTR 2+ patellar, Achilles, bicep, brachial,tricep. Ankle clonus normal with 2 beats.  Strength- 5/5 bilateral UE and LE. Soft touch- intact bilateral UE and LE.  Temperature sensation- intact bilateral UE and LE. Cerebellar testing-Rapid alternating movements intact.  Heel shin intact. Able to walk normal gait, normal heel toe walking. Neck- supple.       Neuropsych: Oriented- Person, place, time. (AAOx3), Mood/affect- normal and congruent. Able to articulate well. Speech-Normal speech, normal rate, normal tone, normal use of language, volume and coherence.  Thought content- normal with ability to perform basic computations and apply abstract thought/reason. Associations- intact, no SI/HI, no hallucinations, delusions, obsessions.  Judgment/insight- Appropriate. Memory-Recall intact, remote and recent memory intact. Knowledge- Age appropriate fund of knowledge, concentration and attention span normal.    Lymphatic: Head/Neck- normal size and non tender to palpation. Axillary- Head and neck LN are normal size and non tender to palpation. Femoral and Inguinal- normal size and non tender to palpation.      Assessment/Plan:  Lukasz was seen today for anxiety.    Diagnoses and all orders for this visit:    Essential hypertension  -     carvedilol (COREG) 25 MG tablet; Take 1 tablet by mouth 2 (Two) Times a Day With Meals.    Chronic gout of multiple sites, unspecified cause  -     colchicine 0.6 MG tablet; Take 2 tabs now, repeat 1 tab in an hour.  May repeat same steps again in 24 hours if needed.  He has chronic gout    Moderate episode of recurrent major depressive disorder (CMS/HCC)         -    PHQ-9 score   9  -     venlafaxine XR (EFFEXOR-XR) 150 MG 24 hr capsule; Take 1 capsule by mouth Daily.    S/P left shoulder surgery           -    Continue wearing sling as directed      Morbid obesity:  BMI:  33.4     Weight:    219    Follow up plan:  Lose at least 3 pounds before next chronic visit, exercise at least 3 times a week for 30 minute increments, eat baked instead of fried foods, and eat healthier     -  Documentation of education   The patient BMI is outside this range and we recommended/discussed today to utilize a diet/exercise program to get back into the appropriate range.  Federal guidelines recommend that people under the age of 65 should have a BMI  of 18.5-24.9  Food diary:  Bring to next visit  tial step is to document everything that is consumed. Pt's that have a food diary  Lose 2x the weight  by keeping track of foods.   Choose one bad food weekly and eliminate it from your diet.  Replace with one healthy food  Exercise diary: Goal over next 2-4 weeks is walk 30 minutes per day 5 days per week at pace difficult to hold conversation.   Drink more water, less soda.   Cut back on portion sizes.   Today we encouraged roughly a 1 lb per week weight loss with initial goal of 5% weight loss.  Avoid fast foods, eat more salads  If eating out for a meal, consider cutting food in half and placing into a to go container.  Individually portion any foods coming into the home   Use smaller plates  Drink 12 ozof water 30 minutes before meal as way to suppress appetite.  Discussed Contrave, metformin, topamax, Belviq and the cost of medications  Encouraged internet programs or self help books  Set  Goals that are realistic   Educated on ways to measure food  Baseball: 1 cup good for green salad, frozen yogurt, medium piece of fruit  Handful:  ½ cup good cut fruit, cooked vegetables, pasta, rice  Egg:  ¼ cup good for dried fruit  Deck of cards: 3 ounces good for meat and poultry  Check book:  3 ounces grilled fish  Plate Method:  reduce plate size to 9 inch dinner plate.  Half of plate should be filled with non-starchy vegetables (broccoli, lettuce, cauliflower, tomatoes), ¼ plate with lean source of protein (lean chicken, turkey, fish), remaining ½ with whole grains (brown rice, potato, whole grain breads  Avoid liquid calories (regular soda, juice, coffee with cream)  Focus  on water, seltzer water and other non-calorie drinks  Replace regular sugar with non-caloric sweeteners  Avoid skipping meals: plan small regular meals throughout the day in order to keep your hunger controlled  Consider using meal replacements if unable to plan a healthy meal (protein shake, high  protein bar)  Replace all white bread with whole wheat/whole grain alternative  Swap regular salad dressings (mayonnaise, butter, or low fat or fat free alternative)  Avoid high fat, high calorie, high carbohydrate snakes (cookies, pastries, cakes)  Snack on fruits, low fat dairy (yogurt, cottage cheese)            Management plan:  Take medications as prescribed; return to the clinic of new or worsening concerns.       Risks/benefits of current and new medications discussed with the patient and or family today.  The patient/family are aware and accept that if there any side effects they should call or return to clinic as soon as possible.  Appropriate F/U discussed for topics addressed today. All questions were answered to the satisfactory state of patient/family.  Should symptoms fail to improve or worsen they agree to call or return to clinic or to go to the ER. Education handouts were offered on any new Rx if requested.  Discussed the importance of following up with any needed screening tests/labs/specialist appointments and any requested follow-up recommended by me today.  Importance of maintaining follow-up discussed and patient accepts that missed appointments can delay diagnosis and potentially lead to worsening of conditions.    Return in about 6 months (around 1/27/2019).    Linda Hoffman, DNP, APRN  7/27/2018

## 2018-07-27 NOTE — PROGRESS NOTES
Subjective   Lukasz Savage is a 53 y.o. male.     Lukasz Savage is a 53 year old white male here for follow up for anxiety/depression and HTN.  He says that he is doing really good      Anxiety   Presents for follow-up visit. Symptoms include depressed mood, excessive worry and suicidal ideas. Patient reports no chest pain, compulsions, confusion, feeling of choking, impotence, insomnia, irritability, palpitations, panic, restlessness or shortness of breath. Symptoms occur occasionally. The severity of symptoms is mild. The patient sleeps 8 hours per night. The quality of sleep is fair. Nighttime awakenings: one to two.     His past medical history is significant for depression. Compliance with medications is 0-25%.   Depression   Visit Type: follow-up  Patient presents with the following symptoms: depressed mood, excessive worry, fatigue and suicidal ideas.  Patient is not experiencing: compulsions, confusion, feelings of hopelessness, impotence, insomnia, irritability, palpitations, panic, restlessness and shortness of breath.  Frequency of symptoms: occasionally   Severity: mild   Sleep per night: 8 hours  Sleep quality: fair  Nighttime awakenings: one to two  Compliance with medications:  0-25%        Hypertension   This is a chronic problem. The current episode started more than 1 year ago. The problem has been gradually improving since onset. The problem is controlled. Associated symptoms include anxiety. Pertinent negatives include no chest pain, palpitations, peripheral edema or shortness of breath. There are no associated agents to hypertension. Risk factors for coronary artery disease include dyslipidemia, family history, male gender, obesity, sedentary lifestyle and stress. Past treatments include ACE inhibitors, lifestyle changes and diuretics. Current antihypertension treatment includes beta blockers, lifestyle changes and diuretics. The current treatment provides mild improvement. There are no  compliance problems.  There is no history of kidney disease, heart failure or retinopathy.        The following portions of the patient's history were reviewed and updated as appropriate: allergies, current medications, past family history, past medical history, past social history, past surgical history and problem list.    Review of Systems   Constitutional: Negative for irritability.   Respiratory: Negative for shortness of breath.    Cardiovascular: Negative for chest pain and palpitations.   Genitourinary: Negative for impotence.   Neurological: Negative for confusion.   Psychiatric/Behavioral: Positive for suicidal ideas and depressed mood. The patient does not have insomnia.        Objective   Physical Exam      Assessment/Plan   Lukasz was seen today for anxiety.    Diagnoses and all orders for this visit:    Essential hypertension  -     carvedilol (COREG) 25 MG tablet; Take 1 tablet by mouth 2 (Two) Times a Day With Meals.    Chronic gout of multiple sites, unspecified cause  -     colchicine 0.6 MG tablet; Take 2 tabs now, repeat 1 tab in an hour.  May repeat same steps again in 24 hours if needed.  He has chronic gout    Moderate episode of recurrent major depressive disorder (CMS/HCC)  -     venlafaxine XR (EFFEXOR-XR) 150 MG 24 hr capsule; Take 1 capsule by mouth Daily.    Return in about 6 months (around 1/27/2019).    Linda Hoffman, DNP, APRN-BC

## 2018-07-27 NOTE — PATIENT INSTRUCTIONS
"Hypertension  Hypertension, commonly called high blood pressure, is when the force of blood pumping through the arteries is too strong. The arteries are the blood vessels that carry blood from the heart throughout the body. Hypertension forces the heart to work harder to pump blood and may cause arteries to become narrow or stiff. Having untreated or uncontrolled hypertension can cause heart attacks, strokes, kidney disease, and other problems.  A blood pressure reading consists of a higher number over a lower number. Ideally, your blood pressure should be below 120/80. The first (\"top\") number is called the systolic pressure. It is a measure of the pressure in your arteries as your heart beats. The second (\"bottom\") number is called the diastolic pressure. It is a measure of the pressure in your arteries as the heart relaxes.  What are the causes?  The cause of this condition is not known.  What increases the risk?  Some risk factors for high blood pressure are under your control. Others are not.  Factors you can change  · Smoking.  · Having type 2 diabetes mellitus, high cholesterol, or both.  · Not getting enough exercise or physical activity.  · Being overweight.  · Having too much fat, sugar, calories, or salt (sodium) in your diet.  · Drinking too much alcohol.  Factors that are difficult or impossible to change  · Having chronic kidney disease.  · Having a family history of high blood pressure.  · Age. Risk increases with age.  · Race. You may be at higher risk if you are -American.  · Gender. Men are at higher risk than women before age 45. After age 65, women are at higher risk than men.  · Having obstructive sleep apnea.  · Stress.  What are the signs or symptoms?  Extremely high blood pressure (hypertensive crisis) may cause:  · Headache.  · Anxiety.  · Shortness of breath.  · Nosebleed.  · Nausea and vomiting.  · Severe chest pain.  · Jerky movements you cannot control (seizures).    How is this " diagnosed?  This condition is diagnosed by measuring your blood pressure while you are seated, with your arm resting on a surface. The cuff of the blood pressure monitor will be placed directly against the skin of your upper arm at the level of your heart. It should be measured at least twice using the same arm. Certain conditions can cause a difference in blood pressure between your right and left arms.  Certain factors can cause blood pressure readings to be lower or higher than normal (elevated) for a short period of time:  · When your blood pressure is higher when you are in a health care provider's office than when you are at home, this is called white coat hypertension. Most people with this condition do not need medicines.  · When your blood pressure is higher at home than when you are in a health care provider's office, this is called masked hypertension. Most people with this condition may need medicines to control blood pressure.    If you have a high blood pressure reading during one visit or you have normal blood pressure with other risk factors:  · You may be asked to return on a different day to have your blood pressure checked again.  · You may be asked to monitor your blood pressure at home for 1 week or longer.    If you are diagnosed with hypertension, you may have other blood or imaging tests to help your health care provider understand your overall risk for other conditions.  How is this treated?  This condition is treated by making healthy lifestyle changes, such as eating healthy foods, exercising more, and reducing your alcohol intake. Your health care provider may prescribe medicine if lifestyle changes are not enough to get your blood pressure under control, and if:  · Your systolic blood pressure is above 130.  · Your diastolic blood pressure is above 80.    Your personal target blood pressure may vary depending on your medical conditions, your age, and other factors.  Follow these  instructions at home:  Eating and drinking  · Eat a diet that is high in fiber and potassium, and low in sodium, added sugar, and fat. An example eating plan is called the DASH (Dietary Approaches to Stop Hypertension) diet. To eat this way:  ? Eat plenty of fresh fruits and vegetables. Try to fill half of your plate at each meal with fruits and vegetables.  ? Eat whole grains, such as whole wheat pasta, brown rice, or whole grain bread. Fill about one quarter of your plate with whole grains.  ? Eat or drink low-fat dairy products, such as skim milk or low-fat yogurt.  ? Avoid fatty cuts of meat, processed or cured meats, and poultry with skin. Fill about one quarter of your plate with lean proteins, such as fish, chicken without skin, beans, eggs, and tofu.  ? Avoid premade and processed foods. These tend to be higher in sodium, added sugar, and fat.  · Reduce your daily sodium intake. Most people with hypertension should eat less than 1,500 mg of sodium a day.  · Limit alcohol intake to no more than 1 drink a day for nonpregnant women and 2 drinks a day for men. One drink equals 12 oz of beer, 5 oz of wine, or 1½ oz of hard liquor.  Lifestyle  · Work with your health care provider to maintain a healthy body weight or to lose weight. Ask what an ideal weight is for you.  · Get at least 30 minutes of exercise that causes your heart to beat faster (aerobic exercise) most days of the week. Activities may include walking, swimming, or biking.  · Include exercise to strengthen your muscles (resistance exercise), such as pilates or lifting weights, as part of your weekly exercise routine. Try to do these types of exercises for 30 minutes at least 3 days a week.  · Do not use any products that contain nicotine or tobacco, such as cigarettes and e-cigarettes. If you need help quitting, ask your health care provider.  · Monitor your blood pressure at home as told by your health care provider.  · Keep all follow-up visits as  told by your health care provider. This is important.  Medicines  · Take over-the-counter and prescription medicines only as told by your health care provider. Follow directions carefully. Blood pressure medicines must be taken as prescribed.  · Do not skip doses of blood pressure medicine. Doing this puts you at risk for problems and can make the medicine less effective.  · Ask your health care provider about side effects or reactions to medicines that you should watch for.  Contact a health care provider if:  · You think you are having a reaction to a medicine you are taking.  · You have headaches that keep coming back (recurring).  · You feel dizzy.  · You have swelling in your ankles.  · You have trouble with your vision.  Get help right away if:  · You develop a severe headache or confusion.  · You have unusual weakness or numbness.  · You feel faint.  · You have severe pain in your chest or abdomen.  · You vomit repeatedly.  · You have trouble breathing.  Summary  · Hypertension is when the force of blood pumping through your arteries is too strong. If this condition is not controlled, it may put you at risk for serious complications.  · Your personal target blood pressure may vary depending on your medical conditions, your age, and other factors. For most people, a normal blood pressure is less than 120/80.  · Hypertension is treated with lifestyle changes, medicines, or a combination of both. Lifestyle changes include weight loss, eating a healthy, low-sodium diet, exercising more, and limiting alcohol.  This information is not intended to replace advice given to you by your health care provider. Make sure you discuss any questions you have with your health care provider.  Document Released: 12/18/2006 Document Revised: 11/15/2017 Document Reviewed: 11/15/2017  [employer] Interactive Patient Education © 2018 [employer]

## 2018-07-30 DIAGNOSIS — E78.5 HYPERLIPIDEMIA, UNSPECIFIED HYPERLIPIDEMIA TYPE: Primary | ICD-10-CM

## 2018-07-31 ENCOUNTER — APPOINTMENT (OUTPATIENT)
Dept: LAB | Facility: HOSPITAL | Age: 53
End: 2018-07-31

## 2018-07-31 ENCOUNTER — OFFICE VISIT (OUTPATIENT)
Dept: ONCOLOGY | Facility: CLINIC | Age: 53
End: 2018-07-31

## 2018-07-31 VITALS
OXYGEN SATURATION: 94 % | BODY MASS INDEX: 33.33 KG/M2 | SYSTOLIC BLOOD PRESSURE: 122 MMHG | HEIGHT: 68 IN | RESPIRATION RATE: 18 BRPM | TEMPERATURE: 97.1 F | WEIGHT: 219.9 LBS | DIASTOLIC BLOOD PRESSURE: 70 MMHG | HEART RATE: 82 BPM

## 2018-07-31 DIAGNOSIS — E78.2 MIXED HYPERLIPIDEMIA: Primary | ICD-10-CM

## 2018-07-31 DIAGNOSIS — N18.30 ANEMIA OF CHRONIC KIDNEY FAILURE, STAGE 3 (MODERATE) (HCC): Primary | ICD-10-CM

## 2018-07-31 DIAGNOSIS — D63.1 ANEMIA OF CHRONIC KIDNEY FAILURE, STAGE 3 (MODERATE) (HCC): Primary | ICD-10-CM

## 2018-07-31 LAB
ALBUMIN SERPL-MCNC: 4.4 G/DL (ref 3.5–5)
ALBUMIN/GLOB SERPL: 1.6 G/DL (ref 1.1–2.5)
ALP SERPL-CCNC: 71 U/L (ref 24–120)
ALT SERPL W P-5'-P-CCNC: 33 U/L (ref 0–54)
ANION GAP SERPL CALCULATED.3IONS-SCNC: 13 MMOL/L (ref 4–13)
AST SERPL-CCNC: 35 U/L (ref 7–45)
BASOPHILS # BLD AUTO: 0.07 10*3/MM3 (ref 0–0.2)
BASOPHILS NFR BLD AUTO: 1 % (ref 0–2)
BILIRUB SERPL-MCNC: 0.2 MG/DL (ref 0.1–1)
BUN BLD-MCNC: 29 MG/DL (ref 5–21)
BUN/CREAT SERPL: 12.4 (ref 7–25)
CALCIUM SPEC-SCNC: 9.5 MG/DL (ref 8.4–10.4)
CHLORIDE SERPL-SCNC: 105 MMOL/L (ref 98–110)
CO2 SERPL-SCNC: 26 MMOL/L (ref 24–31)
CREAT BLD-MCNC: 2.33 MG/DL (ref 0.5–1.4)
DEPRECATED RDW RBC AUTO: 46.9 FL (ref 40–54)
EOSINOPHIL # BLD AUTO: 0.35 10*3/MM3 (ref 0–0.7)
EOSINOPHIL NFR BLD AUTO: 4.8 % (ref 0–4)
ERYTHROCYTE [DISTWIDTH] IN BLOOD BY AUTOMATED COUNT: 14.3 % (ref 12–15)
GFR SERPL CREATININE-BSD FRML MDRD: 30 ML/MIN/1.73
GLOBULIN UR ELPH-MCNC: 2.8 GM/DL
GLUCOSE BLD-MCNC: 110 MG/DL (ref 70–100)
HCT VFR BLD AUTO: 38.8 % (ref 40–52)
HGB BLD-MCNC: 12.7 G/DL (ref 14–18)
HOLD SPECIMEN: NORMAL
HOLD SPECIMEN: NORMAL
IMM GRANULOCYTES # BLD: 0.03 10*3/MM3 (ref 0–0.03)
IMM GRANULOCYTES NFR BLD: 0.4 % (ref 0–5)
LYMPHOCYTES # BLD AUTO: 2.35 10*3/MM3 (ref 0.72–4.86)
LYMPHOCYTES NFR BLD AUTO: 32.1 % (ref 15–45)
MCH RBC QN AUTO: 29.4 PG (ref 28–32)
MCHC RBC AUTO-ENTMCNC: 32.7 G/DL (ref 33–36)
MCV RBC AUTO: 89.8 FL (ref 82–95)
MONOCYTES # BLD AUTO: 0.89 10*3/MM3 (ref 0.19–1.3)
MONOCYTES NFR BLD AUTO: 12.2 % (ref 4–12)
NEUTROPHILS # BLD AUTO: 3.62 10*3/MM3 (ref 1.87–8.4)
NEUTROPHILS NFR BLD AUTO: 49.5 % (ref 39–78)
NRBC BLD MANUAL-RTO: 0 /100 WBC (ref 0–0)
PLATELET # BLD AUTO: 251 10*3/MM3 (ref 130–400)
PMV BLD AUTO: 9.9 FL (ref 6–12)
POTASSIUM BLD-SCNC: 4.6 MMOL/L (ref 3.5–5.3)
PROT SERPL-MCNC: 7.2 G/DL (ref 6.3–8.7)
RBC # BLD AUTO: 4.32 10*6/MM3 (ref 4.8–5.9)
SODIUM BLD-SCNC: 144 MMOL/L (ref 135–145)
WBC NRBC COR # BLD: 7.31 10*3/MM3 (ref 4.8–10.8)

## 2018-07-31 PROCEDURE — 36415 COLL VENOUS BLD VENIPUNCTURE: CPT

## 2018-07-31 PROCEDURE — 80053 COMPREHEN METABOLIC PANEL: CPT | Performed by: INTERNAL MEDICINE

## 2018-07-31 PROCEDURE — 99214 OFFICE O/P EST MOD 30 MIN: CPT | Performed by: INTERNAL MEDICINE

## 2018-07-31 PROCEDURE — 85025 COMPLETE CBC W/AUTO DIFF WBC: CPT | Performed by: INTERNAL MEDICINE

## 2018-07-31 NOTE — PROGRESS NOTES
[facility]  HEMATOLOGY & ONCOLOGY    Cancer Staging Information:  No matching staging information was found for the patient.      Subjective     VISIT DIAGNOSIS:   Encounter Diagnosis   Name Primary?   • Anemia of chronic kidney failure, stage 3 (moderate) Yes       REASON FOR VISIT:     Chief Complaint   Patient presents with   • Follow-up     Anemia: he is here for f/u visit today        HEMATOLOGY / ONCOLOGY HISTORY:    No history exists.           INTERVAL HISTORY  Patient ID: Lukasz Savage is a 53 y.o. year old male here for f/u with no new issues or concerns.  Past Medical History:   Past Medical History:   Diagnosis Date   • Angio-edema 7/3/2017   • Anxiety    • Arthritis    • Clostridium difficile colitis    • Erectile dysfunction 10/6/2016   • Gout    • HCAP (healthcare-associated pneumonia) 7/11/2017   • Hyperlipidemia    • Malignant hypertension    • MSSA (methicillin susceptible Staphylococcus aureus) infection 7/31/2017   • Obstructive sleep apnea    • Seizures (CMS/HCC) 7/4/2017     Past Surgical History:   Past Surgical History:   Procedure Laterality Date   • TRACHEOSTOMY N/A 7/12/2017    Procedure: TRACHEOSTOMY WITH TRACHEOSCOPY;  Surgeon: Jairon Pierson MD;  Location: [facility];  Service:      Social History:   Social History     Social History   • Marital status: Single     Spouse name: N/A   • Number of children: N/A   • Years of education: N/A     Occupational History   • Not on file.     Social History Main Topics   • Smoking status: Former Smoker     Packs/day: 0.33     Years: 30.00     Types: Cigarettes     Quit date: 7/3/2017   • Smokeless tobacco: Never Used   • Alcohol use No      Comment: PER DAY FOR 30 YEARS; Last drink July 3, 2017   • Drug use: No   • Sexual activity: Defer     Other Topics Concern   • Not on file     Social History Narrative   • No narrative on file     Family History:   Family History   Problem Relation Age of Onset   • Hypertension Mother    •  Diabetes Mother    • Heart disease Father    • Hypertension Father    • No Known Problems Daughter    • No Known Problems Son    • Diabetes Maternal Grandmother    • No Known Problems Maternal Grandfather    • No Known Problems Paternal Grandmother    • Heart attack Paternal Grandfather    • Kidney disease Sister        Review of Systems   Constitutional: Positive for fatigue. Negative for appetite change.   HENT: Negative.    Eyes: Negative.    Respiratory: Negative.    Cardiovascular: Negative.    Gastrointestinal: Negative.    Genitourinary: Negative.    Musculoskeletal: Negative.    Skin: Negative.    Neurological: Negative.    Hematological: Negative.    Psychiatric/Behavioral:        Pt believes he is depressed. He was started on zoloft         Performance Status:  Asymptomatic    Medications:    Current Outpatient Prescriptions   Medication Sig Dispense Refill   • carvedilol (COREG) 25 MG tablet Take 1 tablet by mouth 2 (Two) Times a Day With Meals. 180 tablet 1   • CloNIDine (CATAPRES) 0.1 MG tablet Take 1 tablet by mouth Every Night. Check BP 4x daily. If> 180 SBP take x1. 60 tablet 2   • colchicine 0.6 MG tablet Take 2 tabs now, repeat 1 tab in an hour.  May repeat same steps again in 24 hours if needed.  He has chronic gout 30 tablet 2   • fenofibrate (TRICOR) 145 MG tablet Take 1 tablet by mouth Daily. 90 tablet 1   • furosemide (LASIX) 20 MG tablet Take 1 tablet by mouth Daily. 30 tablet 0   • losartan (COZAAR) 100 MG tablet Take 1 tablet by mouth Daily. For high blood pressure 90 tablet 1   • Omega-3 Fatty Acids (FISH OIL) 1000 MG capsule capsule Take 2 capsules by mouth Daily With Breakfast. 180 capsule 1   • omeprazole (PRILOSEC) 10 MG capsule Take 1 capsule by mouth Daily. For acid reflux 90 capsule 1   • spironolactone (ALDACTONE) 25 MG tablet Take 1 tablet by mouth Daily. 90 tablet 1   • vardenafil (LEVITRA) 10 MG tablet Take 1 tablet by mouth Daily As Needed for erectile dysfunction. 10 tablet 5  "  • venlafaxine XR (EFFEXOR-XR) 150 MG 24 hr capsule Take 1 capsule by mouth Daily. 90 capsule 1     No current facility-administered medications for this visit.        ALLERGIES:    Allergies   Allergen Reactions   • Lipitor [Atorvastatin] Rash   • Lisinopril Angioedema       Objective      Vitals:    07/31/18 1053   BP: 122/70   Pulse: 82   Resp: 18   Temp: 97.1 °F (36.2 °C)   TempSrc: Tympanic   SpO2: 94%   Weight: 99.7 kg (219 lb 14.4 oz)   Height: 172.7 cm (67.99\")         Current Status 7/31/2018   ECOG score 0         Physical Exam    General Appearance: Patient is awake, alert, oriented and in no acute distress. Patient is welldeveloped, wellnourished, and appears stated age.  HEENT: Normocephalic. Sclerae clear, conjunctiva pink, extraocular movements intact, pupils, round, reactive to light and  accommodation. Mouth and throat are clear with moist oral mucosa.  NECK: Supple, no jugular venous distention, thyroid not enlarged.  LYMPH: No cervical, supraclavicular, axillary, or inguinal lymphadenopathy.  CHEST: Equal bilateral expansion, AP  diameter normal, resonant percussion note  LUNGS: Good air movement, no rales, rhonchi, rubs or wheezes with auscultation  CARDIO: Regular sinus rhythm, no murmurs, gallops or rubs.  ABDOMEN: Nondistended, soft, No tenderness, no guarding, no rebound, No hepatosplenomegaly. No abdominal masses. Bowel sounds positive. No hernia  GENITALIA: Not examined.  BREASTS: Not examined.  MUSKEL: No joint swelling, decreased motion, or inflammation  EXTREMS: No edema, clubbing, cyanosis, No varicose veins.  NEURO: Grossly nonfocal, Gait is coordinated and smooth, Cognition is preserved.  SKIN: No rashes, no ecchymoses, no petechia.  PSYCH: Oriented to time, place and person. Memory is preserved. Mood and affect appear normal  RECENT LABS:  Orders Only on 07/30/2018   Component Date Value Ref Range Status   • WBC 07/31/2018 7.31  4.80 - 10.80 10*3/mm3 Final   • RBC 07/31/2018 4.32* " 4.80 - 5.90 10*6/mm3 Final   • Hemoglobin 07/31/2018 12.7* 14.0 - 18.0 g/dL Final   • Hematocrit 07/31/2018 38.8* 40.0 - 52.0 % Final   • MCV 07/31/2018 89.8  82.0 - 95.0 fL Final   • MCH 07/31/2018 29.4  28.0 - 32.0 pg Final   • MCHC 07/31/2018 32.7* 33.0 - 36.0 g/dL Final   • RDW 07/31/2018 14.3  12.0 - 15.0 % Final   • RDW-SD 07/31/2018 46.9  40.0 - 54.0 fl Final   • MPV 07/31/2018 9.9  6.0 - 12.0 fL Final   • Platelets 07/31/2018 251  130 - 400 10*3/mm3 Final   • Neutrophil % 07/31/2018 49.5  39.0 - 78.0 % Final   • Lymphocyte % 07/31/2018 32.1  15.0 - 45.0 % Final   • Monocyte % 07/31/2018 12.2* 4.0 - 12.0 % Final   • Eosinophil % 07/31/2018 4.8* 0.0 - 4.0 % Final   • Basophil % 07/31/2018 1.0  0.0 - 2.0 % Final   • Immature Grans % 07/31/2018 0.4  0.0 - 5.0 % Final   • Neutrophils, Absolute 07/31/2018 3.62  1.87 - 8.40 10*3/mm3 Final   • Lymphocytes, Absolute 07/31/2018 2.35  0.72 - 4.86 10*3/mm3 Final   • Monocytes, Absolute 07/31/2018 0.89  0.19 - 1.30 10*3/mm3 Final   • Eosinophils, Absolute 07/31/2018 0.35  0.00 - 0.70 10*3/mm3 Final   • Basophils, Absolute 07/31/2018 0.07  0.00 - 0.20 10*3/mm3 Final   • Immature Grans, Absolute 07/31/2018 0.03  0.00 - 0.03 10*3/mm3 Final   • nRBC 07/31/2018 0.0  0.0 - 0.0 /100 WBC Final       RADIOLOGY:  No results found.         Assessment/Plan  Lukasz Savage is a 53 y.o. year old male seeing for anemia due to ckd with no issues.    Patient Active Problem List   Diagnosis   • Hyperlipidemia   • HTN (hypertension)   • Gout   • Anxiety   • Arthritis   • Erectile dysfunction   • Angio-edema   • Seizures (CMS/HCC)   • HCAP (healthcare-associated pneumonia)   • MSSA (methicillin susceptible Staphylococcus aureus) infection   • Foot deformity   • Chronic gout of right foot   • Peripheral edema   • S/P shoulder surgery          1. Anemia: iron studies low normal. Bone marrow bx 2+ iron. Folate and B12 nl. Myeloma panel negative. BM bx no evidence of malignancy. Suspect  anemia of CKD because erythropoietin is 7. This is low for an anemic patient. Will follow. If Hg less than 10 with adequate iron store will give procrit.  --Labs reviewed, hemoglobin today of 12.7. rtc in 3 months with cbc.  2. CKD: seeing nepnrology too.    3. HTN: on losartan, furosemide, coreg     4. Depression: on zoloft  5. High triglyceride: on fenofibrate  6. ED: on vardenafil.          Jenny Woody MD    7/31/2018    11:10 AM

## 2018-09-04 DIAGNOSIS — I10 HYPERTENSION, UNSPECIFIED TYPE: ICD-10-CM

## 2018-09-04 RX ORDER — CLONIDINE HYDROCHLORIDE 0.1 MG/1
TABLET ORAL
Qty: 60 TABLET | Refills: 2 | OUTPATIENT
Start: 2018-09-04

## 2018-10-16 DIAGNOSIS — E78.5 HYPERLIPIDEMIA, UNSPECIFIED HYPERLIPIDEMIA TYPE: Primary | ICD-10-CM

## 2018-10-23 RX ORDER — QUETIAPINE FUMARATE 100 MG/1
TABLET, FILM COATED ORAL
Qty: 90 TABLET | Refills: 0 | Status: SHIPPED | OUTPATIENT
Start: 2018-10-23 | End: 2018-11-20 | Stop reason: SDUPTHER

## 2018-10-29 ENCOUNTER — APPOINTMENT (OUTPATIENT)
Dept: LAB | Facility: HOSPITAL | Age: 53
End: 2018-10-29

## 2018-11-20 ENCOUNTER — OFFICE VISIT (OUTPATIENT)
Dept: FAMILY MEDICINE CLINIC | Facility: CLINIC | Age: 53
End: 2018-11-20

## 2018-11-20 VITALS
SYSTOLIC BLOOD PRESSURE: 146 MMHG | WEIGHT: 211 LBS | OXYGEN SATURATION: 99 % | HEIGHT: 68 IN | HEART RATE: 97 BPM | TEMPERATURE: 97.8 F | RESPIRATION RATE: 18 BRPM | DIASTOLIC BLOOD PRESSURE: 98 MMHG | BODY MASS INDEX: 31.98 KG/M2

## 2018-11-20 DIAGNOSIS — E78.2 MIXED HYPERLIPIDEMIA: ICD-10-CM

## 2018-11-20 DIAGNOSIS — I10 HYPERTENSION, UNSPECIFIED TYPE: ICD-10-CM

## 2018-11-20 DIAGNOSIS — F33.1 MODERATE EPISODE OF RECURRENT MAJOR DEPRESSIVE DISORDER (HCC): ICD-10-CM

## 2018-11-20 DIAGNOSIS — K21.9 GASTROESOPHAGEAL REFLUX DISEASE WITHOUT ESOPHAGITIS: ICD-10-CM

## 2018-11-20 DIAGNOSIS — R89.9 ABNORMAL LABORATORY TEST: ICD-10-CM

## 2018-11-20 DIAGNOSIS — R53.83 FATIGUE, UNSPECIFIED TYPE: ICD-10-CM

## 2018-11-20 DIAGNOSIS — I15.9 SECONDARY HYPERTENSION: ICD-10-CM

## 2018-11-20 DIAGNOSIS — Z23 NEEDS FLU SHOT: Primary | ICD-10-CM

## 2018-11-20 DIAGNOSIS — N52.9 ERECTILE DYSFUNCTION, UNSPECIFIED ERECTILE DYSFUNCTION TYPE: ICD-10-CM

## 2018-11-20 DIAGNOSIS — M1A.09X0 CHRONIC GOUT OF MULTIPLE SITES, UNSPECIFIED CAUSE: ICD-10-CM

## 2018-11-20 DIAGNOSIS — I10 ESSENTIAL HYPERTENSION: ICD-10-CM

## 2018-11-20 PROCEDURE — 90674 CCIIV4 VAC NO PRSV 0.5 ML IM: CPT | Performed by: NURSE PRACTITIONER

## 2018-11-20 PROCEDURE — 90471 IMMUNIZATION ADMIN: CPT | Performed by: NURSE PRACTITIONER

## 2018-11-20 PROCEDURE — 99214 OFFICE O/P EST MOD 30 MIN: CPT | Performed by: NURSE PRACTITIONER

## 2018-11-20 RX ORDER — CARVEDILOL 25 MG/1
25 TABLET ORAL 2 TIMES DAILY WITH MEALS
Qty: 180 TABLET | Refills: 1 | Status: SHIPPED | OUTPATIENT
Start: 2018-11-20 | End: 2019-05-20 | Stop reason: SDUPTHER

## 2018-11-20 RX ORDER — FUROSEMIDE 20 MG/1
20 TABLET ORAL DAILY
Qty: 30 TABLET | Refills: 0 | Status: SHIPPED | OUTPATIENT
Start: 2018-11-20 | End: 2018-12-17 | Stop reason: SDUPTHER

## 2018-11-20 RX ORDER — CLONIDINE HYDROCHLORIDE 0.1 MG/1
0.1 TABLET ORAL NIGHTLY
Qty: 60 TABLET | Refills: 2 | Status: SHIPPED | OUTPATIENT
Start: 2018-11-20 | End: 2019-05-20 | Stop reason: SDUPTHER

## 2018-11-20 RX ORDER — QUETIAPINE FUMARATE 100 MG/1
100 TABLET, FILM COATED ORAL
Qty: 90 TABLET | Refills: 0 | Status: SHIPPED | OUTPATIENT
Start: 2018-11-20 | End: 2019-02-22 | Stop reason: SDUPTHER

## 2018-11-20 RX ORDER — LOSARTAN POTASSIUM 100 MG/1
100 TABLET ORAL
Qty: 90 TABLET | Refills: 1 | Status: SHIPPED | OUTPATIENT
Start: 2018-11-20 | End: 2019-04-27 | Stop reason: SDUPTHER

## 2018-11-20 RX ORDER — CHLORAL HYDRATE 500 MG
2000 CAPSULE ORAL
Qty: 180 CAPSULE | Refills: 1 | Status: SHIPPED | OUTPATIENT
Start: 2018-11-20 | End: 2019-05-20 | Stop reason: SDUPTHER

## 2018-11-20 RX ORDER — FENOFIBRATE 145 MG/1
145 TABLET, COATED ORAL DAILY
Qty: 90 TABLET | Refills: 1 | Status: SHIPPED | OUTPATIENT
Start: 2018-11-20 | End: 2019-05-20 | Stop reason: SDUPTHER

## 2018-11-20 RX ORDER — SPIRONOLACTONE 25 MG/1
25 TABLET ORAL DAILY
Qty: 90 TABLET | Refills: 1 | Status: SHIPPED | OUTPATIENT
Start: 2018-11-20 | End: 2019-04-27 | Stop reason: SDUPTHER

## 2018-11-20 RX ORDER — OMEPRAZOLE 10 MG/1
10 CAPSULE, DELAYED RELEASE ORAL DAILY
Qty: 90 CAPSULE | Refills: 1 | Status: SHIPPED | OUTPATIENT
Start: 2018-11-20 | End: 2019-05-20 | Stop reason: SDUPTHER

## 2018-11-20 RX ORDER — COLCHICINE 0.6 MG/1
TABLET ORAL
Qty: 30 TABLET | Refills: 2 | Status: SHIPPED | OUTPATIENT
Start: 2018-11-20 | End: 2019-06-20 | Stop reason: HOSPADM

## 2018-11-20 RX ORDER — VENLAFAXINE HYDROCHLORIDE 150 MG/1
150 CAPSULE, EXTENDED RELEASE ORAL DAILY
Qty: 90 CAPSULE | Refills: 1 | Status: SHIPPED | OUTPATIENT
Start: 2018-11-20 | End: 2019-05-20 | Stop reason: SDUPTHER

## 2018-11-20 RX ORDER — VARDENAFIL HYDROCHLORIDE 10 MG/1
10 TABLET ORAL DAILY PRN
Qty: 10 TABLET | Refills: 5 | Status: SHIPPED | OUTPATIENT
Start: 2018-11-20 | End: 2019-05-20

## 2018-11-20 NOTE — PROGRESS NOTES
Chief Complaint   Patient presents with   • Anxiety     Pt is here for followup and medication refills.      • Hypertension          HPI  Lukasz Savage is a 53 y.o. male presents today for follow up and refills for HTN, gout, hyperlipidemia, ed and depression.  His bp is significantly elevated but he says he has not taking his bp meds (carvedilol, clonidine, losartan, spironolactone) because he had been at [employer] hunting.  He does monitor it at home.  He denies any chest pain, shortness of breath or headache.  He does complain of knee and foot pain but this is not a new problem.  He does see ortho for this.  Denies pain right now    Chronic problems: HTN elevated today has not taken carvedilol, spironolactone or clonidine, gout stable with colchicine for flairs, hyperlipidemia stable with fenofibrate, depression stable with venlafaxine and quetiapine, gerd stable with omeprazole, edema stable with prn furosemide    PCP:  Linda Hoffman, DNP, APRN    Allergies   Allergen Reactions   • Lipitor [Atorvastatin] Rash   • Lisinopril Angioedema       Past Medical History:   Diagnosis Date   • Angio-edema 7/3/2017   • Anxiety    • Arthritis    • Clostridium difficile colitis    • Erectile dysfunction 10/6/2016   • Gout    • HCAP (healthcare-associated pneumonia) 2017   • Hyperlipidemia    • Malignant hypertension    • MSSA (methicillin susceptible Staphylococcus aureus) infection 2017   • Obstructive sleep apnea    • Seizures (CMS/HCC) 2017       History reviewed. No pertinent surgical history.    Social History     Socioeconomic History   • Marital status: Single     Spouse name: Not on file   • Number of children: Not on file   • Years of education: Not on file   • Highest education level: Not on file   Tobacco Use   • Smoking status: Former Smoker     Packs/day: 0.33     Years: 30.00     Pack years: 9.90     Types: Cigarettes     Last attempt to quit: 7/3/2017     Years since quittin.3   •  Smokeless tobacco: Never Used   Substance and Sexual Activity   • Alcohol use: No     Comment: PER DAY FOR 30 YEARS; Last drink July 3, 2017   • Drug use: No   • Sexual activity: Defer       Family History   Problem Relation Age of Onset   • Hypertension Mother    • Diabetes Mother    • Heart disease Father    • Hypertension Father    • No Known Problems Daughter    • No Known Problems Son    • Diabetes Maternal Grandmother    • No Known Problems Maternal Grandfather    • No Known Problems Paternal Grandmother    • Heart attack Paternal Grandfather    • Kidney disease Sister        Current Outpatient Medications on File Prior to Visit   Medication Sig Dispense Refill   • [DISCONTINUED] carvedilol (COREG) 25 MG tablet Take 1 tablet by mouth 2 (Two) Times a Day With Meals. 180 tablet 1   • [DISCONTINUED] CloNIDine (CATAPRES) 0.1 MG tablet Take 1 tablet by mouth Every Night. Check BP 4x daily. If> 180 SBP take x1. 60 tablet 2   • [DISCONTINUED] colchicine 0.6 MG tablet Take 2 tabs now, repeat 1 tab in an hour.  May repeat same steps again in 24 hours if needed.  He has chronic gout 30 tablet 2   • [DISCONTINUED] furosemide (LASIX) 20 MG tablet Take 1 tablet by mouth Daily. 30 tablet 0   • [DISCONTINUED] losartan (COZAAR) 100 MG tablet Take 1 tablet by mouth Daily. For high blood pressure 90 tablet 1   • [DISCONTINUED] Omega-3 Fatty Acids (FISH OIL) 1000 MG capsule capsule Take 2 capsules by mouth Daily With Breakfast. 180 capsule 1   • [DISCONTINUED] omeprazole (PRILOSEC) 10 MG capsule Take 1 capsule by mouth Daily. For acid reflux 90 capsule 1   • [DISCONTINUED] QUEtiapine (SEROquel) 100 MG tablet TAKE ONE TABLET BY MOUTH EVERY NIGHT AT BEDTIME 90 tablet 0   • [DISCONTINUED] spironolactone (ALDACTONE) 25 MG tablet Take 1 tablet by mouth Daily. 90 tablet 1   • [DISCONTINUED] vardenafil (LEVITRA) 10 MG tablet Take 1 tablet by mouth Daily As Needed for erectile dysfunction. 10 tablet 5   • [DISCONTINUED] venlafaxine XR  "(EFFEXOR-XR) 150 MG 24 hr capsule Take 1 capsule by mouth Daily. 90 capsule 1   • [DISCONTINUED] fenofibrate (TRICOR) 145 MG tablet Take 1 tablet by mouth Daily. 90 tablet 1     No current facility-administered medications on file prior to visit.         REVIEW OF SYMPTOMS: (Positives bolded)  General:  weight loss, fever, chills, night sweats, fatigue, appetite loss  HEENT:  blurry vision, eye pain, eye discharge, dry eyes, decreased vision, sore throat tinnitus, bloody nose, hearin gloss, sinus pain/pressure, ear pain/pressure.   Respiratory: shortness of breath, cough, hemoptysis, wheezing, pleurisy,   Cardiovascular:  chest pain, PND, palpitation, edema, orthopnea, syncope, swelling of extremities  Gastro: Nausea, vomiting, diarrhea, hematemesis, abdominal pain, constipation  Genito: hematuria, dysuria, glycosuria, hesitancy, frequency, incontinence  Musckelo: Arthralgia, myalgia, muscle weakness, joint swelling, NSAID use  Skin: rash, pruritis, sores, nail changes, skin thickening, change in wart/mole, itching, rash, new lesions, pruritus, nail changes  Neuro:  Migraine, numbness, ataxia, tremor, vertigo, weakness, memory loss  \"All other systems reviewed and negative, except as listed above.”      OBJECTIVE:  Constitutional:  Appearance-No acute distress, Consistent with stated age. Orientation- Oriented x 3, alert Posture-Not doubled over. Gait-Normal pace, normal arm movement. Posture- Normal Build and Nutrition-Well developed and well nourished.  General- Patient is pleasant and cooperative with the interview and exam.    Integumentary: General-No rashes, ulcers or lesions. No edema.  Palpation- Normal skin moisture/turgor. Skin is warm to touch, no increased warmth. Capillary refill is normal bilateral Upper and lower extremity.     Eye: Bilaterally PERRLA, EOMI.  No discharge.  Upper and lower eyelids are normal. Sclera/conjunctiva normal without discharge. Cornea is normal and clear. Lens is normal.  " Eyeball appears normal. No ciliary flushing, no conjunctival injection.    ENMT:  Pinna- normal without tenderness or erythema.  External auditory canal Left- normal without erythema or discharge, no excessive cerumen. External auditory canal Right-normal without erythema or discharge, no excessive cerumen. TM left- Grey/pearly, normal light reflex and anatomy TM Right- Grey/pearly, normal light reflex and anatomy Hearing Assessment-normal to conversational speech at 2-5 feet.  Nose and sinus-No sinus tenderness along frontal/maxillary region. External appearance normal and midline. Nares- bilateral quiet airflow, no discharge. Nasal mucosa- No bleeding noted and no ulcerations observed. Pink, moist. Turbinates non boggy. Lips- normal color, moist without cracks/lesions Oral Cavity/Palate- hard/soft palate intact without lesions, oral mucosa pink and moist. Dentition assessed and discussed appropriate oral care. Tongue normal midline.  Oropharynx- no pharyngeal erythema, Uvula midline. No post nasal drip. No exudate. Salivary glands- Non tender to palpation    CHEST/LUNG: Inspection- symmetric chest wall no pectus deformity. Normal effort, no distress, no use of accessory muscles. Palpation- nontender sternum, ribline.  No abnormal pulsations. Auscultation- Breath sounds normal throughout all lung fields.  Normal tracheal sounds, Normal bronchial sounds overlying sternum, Bronchovessicular sounds normal between scapulae posteriorly, Normal vessicular breath sounds heard throughout periphery. Lungs are clear today. Adventitious sounds- No wheezes, rales, rhonchi.     CARDIOVASCULAR:  Carotid artery- normal, no bruits or abnormal pulsations. Jugular vein- no pulsations. Palpation/Percussion- Normal PMI, no palpable thrill  Auscultation- Regular rate and rhythm. No murmur noted in sitting, supine positions. Extremities- no digital clubbing, cyanosis, edema, increased warmth.    ABDOMEN: Inspection- normal and no  visible pulsations. Normal contour. Auscultation- Bowel sounds normal, no abdominal bruits. Palpation/Percussion- soft, non-tender, no rebound tenderness, no rigidity (guarding), no jar tenderness, no masses.  Liver-no hepatomegaly, Spleen - no splenomegaly, Hernias- none. Rectal not examined.     Peripheral Vascular: Upper extremity Left- Normal temperature with pink nailbeds and no ulcerations.  Upper extremity Right- Normal temperature with pink nailbeds and no ulcerations.  Lower extremity- Normal temperature with pink nailbeds and no ulcerations. DP pulses 2+ bilaterally.  Pedal hair intact.  Normal capillary refill. Edema- 1+  Edema.bilateral ankles.    Musculoskeletal:  digital clubbing or cyanosis, neurovascularly intact all four extremities.  Upper extremity- Symmetrical posture.  No visible deformity.  Normal sensation along medial and lateral upper extremity proximally and distally.  NO tenderness overlying shoulder, lateral/medial epicondyle.  5/5 and strength 5/5 bilateral UE.  Elbow palpated, no tenderness overlying olecranon.  Normal supination, pronation to active/passive ROM and to resisted rotation. Bicep insertion/tricep insertion appear normal without obvious pathology. Rotator cuff evaluated and intact.  Normal wrist ROM bilaterally. Normal hand movement, intrinsic muscles of hands normal. No tenderness to palpation of hands/wrists/elbows.  Lower extremity- Not tender to palpation, no pain, no swelling, edema or erythema of surrounding tissue, normal strength and tone.  Normal appearing hip ROM bilaterally without pain.  Knee ROM normal at 0-120 degrees. No tenderness overlying trochanters, no tenderness about patella, quad tendon, patellar tendon.  No tenderness at tibial tuberosity. Ankle normal ROM not tender to palpation along medial/lateral malleolus. Normal movement of toes, no tenderness bilateral feet/toes.  Normal foot type. Calves symmetrical.  Stretching demonstrated  today.  Spine/Ribs- No deformities, masses or tenderness, no known fractures, normal strength, Normal ROM. Normal stability No tenderness along C/T/L spine.  Normal appearing ROM about spine.      Neurological: General- Moves all 4 extremities symmetrically. Symmetrical face and body posture. Cranial nerves- individually evaluated II-XII and intact. PERRLA, Normal EOMI, visual/special senses appear intact, Face is symmetrical and normal sensation/movement, normal tongue, normal strength/posture of neck musculature. Balance- Romberg intact.  Reflexes- ntact with DTR 2+ patellar, Achilles, bicep, brachial,tricep. Ankle clonus normal with 2 beats.  Strength- 5/5 bilateral UE and LE. Soft touch- intact bilateral UE and LE.  Temperature sensation- intact bilateral UE and LE. Cerebellar testing-Rapid alternating movements intact.  Heel shin intact. Able to walk normal gait, normal heel toe walking. Neck- supple.      Neuropsych: Oriented- Person, place, time. (AAOx3), Mood/affect- normal and congruent. Able to articulate well. Speech-Normal speech, normal rate, normal tone, normal use of language, volume and coherence.  Thought content- normal with ability to perform basic computations and apply abstract thought/reason. Associations- intact, no SI/HI, no hallucinations, delusions, obsessions.  Judgment/insight- Appropriate. Memory-Recall intact, remote and recent memory intact. Knowledge- Age appropriate fund of knowledge, concentration and attention span normal.    Lymphatic: Head/Neck- normal size and non tender to palpation. Axillary- Head and neck LN are normal size and non tender to palpation. Femoral and Inguinal- normal size and non tender to palpation.      Assessment/Plan:  Lukasz was seen today for anxiety and hypertension.    Diagnoses and all orders for this visit:    Needs flu shot  -     Flucelvax Quad=>4Years ([id number])    Essential hypertension  -     carvedilol (COREG) 25 MG tablet; Take 1 tablet by  mouth 2 (Two) Times a Day With Meals.  -     CloNIDine (CATAPRES) 0.1 MG tablet; Take 1 tablet by mouth Every Night. Check BP 4x daily. If> 180 SBP take x1.  -     furosemide (LASIX) 20 MG tablet; Take 1 tablet by mouth Daily.  -     spironolactone (ALDACTONE) 25 MG tablet; Take 1 tablet by mouth Daily.        -     Monitor bp and if continues to be elevated follow up        -     Take all meds as prescribed    Chronic gout of multiple sites, unspecified cause  -     colchicine 0.6 MG tablet; Take 2 tabs now, repeat 1 tab in an hour.  May repeat same steps again in 24 hours if needed.  He has chronic gout    Secondary hypertension  -     losartan (COZAAR) 100 MG tablet; Take 1 tablet by mouth Daily. For high blood pressure    Gastroesophageal reflux disease without esophagitis  -     omeprazole (PRILOSEC) 10 MG capsule; Take 1 capsule by mouth Daily. For acid reflux    Erectile dysfunction, unspecified erectile dysfunction type  -     vardenafil (LEVITRA) 10 MG tablet; Take 1 tablet by mouth Daily As Needed for erectile dysfunction.    Moderate episode of recurrent major depressive disorder (CMS/HCC)  -     QUEtiapine (SEROquel) 100 MG tablet; Take 1 tablet by mouth every night at bedtime.  -     venlafaxine XR (EFFEXOR-XR) 150 MG 24 hr capsule; Take 1 capsule by mouth Daily.    Mixed hyperlipidemia  -     Omega-3 Fatty Acids (FISH OIL) 1000 MG capsule capsule; Take 2 capsules by mouth Daily With Breakfast.    Abnormal laboratory test  -     fenofibrate (TRICOR) 145 MG tablet; Take 1 tablet by mouth Daily.    Fatigue, unspecified type  -     Iron  -     Vitamin B12 and Folate  -     Ferritin  -     CBC and Differential  -     Reticulocytes        Morbid obesity:  BMI:   32.1    Weight:    211    Follow up plan:  Lose at least 3 pounds before next chronic visit, exercise at least 3 times a week for 30 minute increments, eat baked instead of fried foods, and eat healthier     -  Documentation of education   The  patient BMI is outside this range and we recommended/discussed today to utilize a diet/exercise program to get back into the appropriate range.  Federal guidelines recommend that people under the age of 65 should have a BMI of 18.5-24.9  Food diary:  Bring to next visit  tial step is to document everything that is consumed. Pt's that have a food diary  Lose 2x the weight  by keeping track of foods.   Choose one bad food weekly and eliminate it from your diet.  Replace with one healthy food  Exercise diary: Goal over next 2-4 weeks is walk 30 minutes per day 5 days per week at pace difficult to hold conversation.   Drink more water, less soda.   Cut back on portion sizes.   Today we encouraged roughly a 1 lb per week weight loss with initial goal of 5% weight loss.  Avoid fast foods, eat more salads  If eating out for a meal, consider cutting food in half and placing into a to go container.  Individually portion any foods coming into the home   Use smaller plates  Drink 12 ozof water 30 minutes before meal as way to suppress appetite.  Discussed Contrave, metformin, topamax, Belviq and the cost of medications  Encouraged internet programs or self help books  Set  Goals that are realistic   Educated on ways to measure food  Baseball: 1 cup good for green salad, frozen yogurt, medium piece of fruit  Handful:  ½ cup good cut fruit, cooked vegetables, pasta, rice  Egg:  ¼ cup good for dried fruit  Deck of cards: 3 ounces good for meat and poultry  Check book:  3 ounces grilled fish  Plate Method:  reduce plate size to 9 inch dinner plate.  Half of plate should be filled with non-starchy vegetables (broccoli, lettuce, cauliflower, tomatoes), ¼ plate with lean source of protein (lean chicken, turkey, fish), remaining ½ with whole grains (brown rice, potato, whole grain breads  Avoid liquid calories (regular soda, juice, coffee with cream)  Focus  on water, seltzer water and other non-calorie drinks  Replace regular sugar  with non-caloric sweeteners  Avoid skipping meals: plan small regular meals throughout the day in order to keep your hunger controlled  Consider using meal replacements if unable to plan a healthy meal (protein shake, high protein bar)  Replace all white bread with whole wheat/whole grain alternative  Swap regular salad dressings (mayonnaise, butter, or low fat or fat free alternative)  Avoid high fat, high calorie, high carbohydrate snakes (cookies, pastries, cakes)  Snack on fruits, low fat dairy (yogurt, cottage cheese)            Management plan:  Take medications as prescribed; return to the clinic of new or worsening concerns.       Risks/benefits of current and new medications discussed with the patient and or family today.  The patient/family are aware and accept that if there any side effects they should call or return to clinic as soon as possible.  Appropriate F/U discussed for topics addressed today. All questions were answered to the satisfactory state of patient/family.  Should symptoms fail to improve or worsen they agree to call or return to clinic or to go to the ER. Education handouts were offered on any new Rx if requested.  Discussed the importance of following up with any needed screening tests/labs/specialist appointments and any requested follow-up recommended by me today.  Importance of maintaining follow-up discussed and patient accepts that missed appointments can delay diagnosis and potentially lead to worsening of conditions.    Return in about 6 months (around 5/20/2019) for Recheck htn hyperlipidemia.    Linda Hoffman, DNP, APRN  11/20/2018

## 2018-11-21 LAB
BASOPHILS # BLD AUTO: 0.05 10*3/MM3 (ref 0–0.2)
BASOPHILS NFR BLD AUTO: 0.8 % (ref 0–2)
EOSINOPHIL # BLD AUTO: 0.11 10*3/MM3 (ref 0–0.7)
EOSINOPHIL NFR BLD AUTO: 1.7 % (ref 0–4)
ERYTHROCYTE [DISTWIDTH] IN BLOOD BY AUTOMATED COUNT: 14.4 % (ref 12–15)
FERRITIN SERPL-MCNC: 67.8 NG/ML (ref 17.9–464)
FOLATE SERPL-MCNC: 4.6 NG/ML
HCT VFR BLD AUTO: 39.8 % (ref 40–52)
HGB BLD-MCNC: 13.1 G/DL (ref 14–18)
IMM GRANULOCYTES # BLD: 0.03 10*3/MM3 (ref 0–0.03)
IMM GRANULOCYTES NFR BLD: 0.5 % (ref 0–5)
IRON SERPL-MCNC: 85 MCG/DL (ref 42–180)
LYMPHOCYTES # BLD AUTO: 2.4 10*3/MM3 (ref 0.72–4.86)
LYMPHOCYTES NFR BLD AUTO: 37 % (ref 15–45)
MCH RBC QN AUTO: 30.2 PG (ref 28–32)
MCHC RBC AUTO-ENTMCNC: 32.9 G/DL (ref 33–36)
MCV RBC AUTO: 91.7 FL (ref 82–95)
MONOCYTES # BLD AUTO: 0.44 10*3/MM3 (ref 0.19–1.3)
MONOCYTES NFR BLD AUTO: 6.8 % (ref 4–12)
NEUTROPHILS # BLD AUTO: 3.46 10*3/MM3 (ref 1.87–8.4)
NEUTROPHILS NFR BLD AUTO: 53.2 % (ref 39–78)
NRBC BLD AUTO-RTO: 0 /100 WBC (ref 0–0)
PLATELET # BLD AUTO: 318 10*3/MM3 (ref 130–400)
RBC # BLD AUTO: 4.34 10*6/MM3 (ref 4.8–5.9)
RETICS/RBC NFR AUTO: 2.18 % (ref 0.6–1.8)
VIT B12 SERPL-MCNC: 338 PG/ML (ref 239–931)
WBC # BLD AUTO: 6.49 10*3/MM3 (ref 4.8–10.8)

## 2018-11-26 DIAGNOSIS — R79.89 ABNORMAL CBC: Primary | ICD-10-CM

## 2018-11-26 DIAGNOSIS — R70.1 RETICULOCYTOSIS: ICD-10-CM

## 2018-12-17 DIAGNOSIS — I10 ESSENTIAL HYPERTENSION: ICD-10-CM

## 2018-12-17 RX ORDER — FUROSEMIDE 20 MG/1
TABLET ORAL
Qty: 30 TABLET | Refills: 0 | Status: ON HOLD | OUTPATIENT
Start: 2018-12-17 | End: 2019-06-04

## 2018-12-26 ENCOUNTER — RESULTS ENCOUNTER (OUTPATIENT)
Dept: FAMILY MEDICINE CLINIC | Facility: CLINIC | Age: 53
End: 2018-12-26

## 2018-12-26 DIAGNOSIS — R70.1 RETICULOCYTOSIS: ICD-10-CM

## 2018-12-26 DIAGNOSIS — R79.89 ABNORMAL CBC: ICD-10-CM

## 2019-01-19 DIAGNOSIS — I10 ESSENTIAL HYPERTENSION: ICD-10-CM

## 2019-01-21 RX ORDER — CLONIDINE HYDROCHLORIDE 0.1 MG/1
TABLET ORAL
Qty: 60 TABLET | Refills: 2 | OUTPATIENT
Start: 2019-01-21

## 2019-01-31 ENCOUNTER — TELEPHONE (OUTPATIENT)
Dept: FAMILY MEDICINE CLINIC | Facility: CLINIC | Age: 54
End: 2019-01-31

## 2019-02-01 DIAGNOSIS — M25.511 ACUTE PAIN OF RIGHT SHOULDER: Primary | ICD-10-CM

## 2019-02-16 ENCOUNTER — NURSE TRIAGE (OUTPATIENT)
Dept: CALL CENTER | Facility: HOSPITAL | Age: 54
End: 2019-02-16

## 2019-02-16 NOTE — TELEPHONE ENCOUNTER
Reason for Disposition  • Message left on identified answering machine.    Additional Information  • Negative: Caller is angry or rude (e.g., hangs up, verbally abusive, yelling)  • Negative: Caller hangs up  • Negative: Caller has already spoken with the PCP and has no further questions.  • Negative: Caller has already spoken with another triager and has no further questions.  • Negative: Caller has already spoken with another triager or PCP AND has further questions AND triager able to answer questions.  • Negative: Busy signal.  First attempt to contact caller.  Follow-up call scheduled within 15 minutes.  • Negative: No answer.  First attempt to contact caller.  Follow-up call scheduled within 15 minutes.    Protocols used: NO CONTACT OR DUPLICATE CONTACT CALL-ADULT-

## 2019-02-16 NOTE — TELEPHONE ENCOUNTER
"Caller has been having his right face go numb four times since last night, unknown onset time. Now having slurred speech. Explained could be a stroke, needs emergent care in ER.     Reason for Disposition  • [1] Numbness (i.e., loss of sensation) of the face, arm / hand, or leg / foot on one side of the body AND [2] sudden onset AND [3] present now    Additional Information  • Negative: [1] SEVERE weakness (i.e., unable to walk or barely able to walk, requires support) AND [2] new onset or worsening  • Negative: [1] Weakness (i.e., paralysis, loss of muscle strength) of the face, arm / hand, or leg / foot on one side of the body AND [2] sudden onset AND [3] present now    Answer Assessment - Initial Assessment Questions  1. SYMPTOM: \"What is the main symptom you are concerned about?\" (e.g., weakness, numbness)      Rt facial numbness 4 times since last night, speech slurred  2. ONSET: \"When did this start?\" (minutes, hours, days; while sleeping)      Last night, cannot give specific time  3. LAST NORMAL: \"When was the last time you were normal (no symptoms)?\"      Last night  4. PATTERN \"Does this come and go, or has it been constant since it started?\"  \"Is it present now?\"      Intermittent  5. CARDIAC SYMPTOMS: \"Have you had any of the following symptoms: chest pain, difficulty breathing, palpitations?\"      None  6. NEUROLOGIC SYMPTOMS: \"Have you had any of the following symptoms: headache, dizziness, vision loss, double vision, changes in speech, unsteady on your feet?\"     Slurred speech  7. OTHER SYMPTOMS: \"Do you have any other symptoms?\"      HTN  8. PREGNANCY: \"Is there any chance you are pregnant?\" \"When was your last menstrual period?\"      NA    Protocols used: NEUROLOGIC DEFICIT-ADULT-[facility]      "

## 2019-02-16 NOTE — TELEPHONE ENCOUNTER
Message left on [facility].  Return call went unanswered Voicemail box is full. Unable to leave message.  Reason for Disposition  • No answer.  First attempt to contact caller.  Follow-up call scheduled within 15 minutes.    Additional Information  • Negative: Caller is angry or rude (e.g., hangs up, verbally abusive, yelling)  • Negative: Caller hangs up  • Negative: Caller has already spoken with the PCP and has no further questions.  • Negative: Caller has already spoken with another triager and has no further questions.  • Negative: Caller has already spoken with another triager or PCP AND has further questions AND triager able to answer questions.  • Negative: Busy signal.  First attempt to contact caller.  Follow-up call scheduled within 15 minutes.    Protocols used: NO CONTACT OR DUPLICATE CONTACT CALL-[facility]

## 2019-02-19 ENCOUNTER — TELEPHONE (OUTPATIENT)
Dept: FAMILY MEDICINE CLINIC | Facility: CLINIC | Age: 54
End: 2019-02-19

## 2019-02-22 ENCOUNTER — OFFICE VISIT (OUTPATIENT)
Dept: FAMILY MEDICINE CLINIC | Facility: CLINIC | Age: 54
End: 2019-02-22

## 2019-02-22 VITALS
HEIGHT: 68 IN | HEART RATE: 89 BPM | OXYGEN SATURATION: 97 % | RESPIRATION RATE: 20 BRPM | WEIGHT: 203 LBS | TEMPERATURE: 98.4 F | DIASTOLIC BLOOD PRESSURE: 90 MMHG | BODY MASS INDEX: 30.77 KG/M2 | SYSTOLIC BLOOD PRESSURE: 116 MMHG

## 2019-02-22 DIAGNOSIS — R20.2 TINGLING OF LEFT ARM AND LEFT SIDE OF FACE: Primary | ICD-10-CM

## 2019-02-22 DIAGNOSIS — G43.909 MIGRAINE WITHOUT STATUS MIGRAINOSUS, NOT INTRACTABLE, UNSPECIFIED MIGRAINE TYPE: ICD-10-CM

## 2019-02-22 DIAGNOSIS — I73.9 CLAUDICATION (HCC): ICD-10-CM

## 2019-02-22 DIAGNOSIS — R56.9 SEIZURES (HCC): ICD-10-CM

## 2019-02-22 DIAGNOSIS — F33.1 MODERATE EPISODE OF RECURRENT MAJOR DEPRESSIVE DISORDER (HCC): ICD-10-CM

## 2019-02-22 DIAGNOSIS — R89.9 ABNORMAL LABORATORY TEST: ICD-10-CM

## 2019-02-22 PROCEDURE — 99214 OFFICE O/P EST MOD 30 MIN: CPT | Performed by: NURSE PRACTITIONER

## 2019-02-22 PROCEDURE — 93000 ELECTROCARDIOGRAM COMPLETE: CPT | Performed by: NURSE PRACTITIONER

## 2019-02-22 RX ORDER — QUETIAPINE FUMARATE 100 MG/1
100 TABLET, FILM COATED ORAL
Qty: 90 TABLET | Refills: 0 | Status: SHIPPED | OUTPATIENT
Start: 2019-02-22 | End: 2019-06-20 | Stop reason: HOSPADM

## 2019-02-22 RX ORDER — ALLOPURINOL 100 MG/1
100 TABLET ORAL DAILY
Status: ON HOLD | COMMUNITY
End: 2019-06-04 | Stop reason: DRUGHIGH

## 2019-02-22 RX ORDER — FENOFIBRATE 145 MG/1
TABLET, COATED ORAL
Qty: 90 TABLET | Refills: 1 | OUTPATIENT
Start: 2019-02-22

## 2019-02-22 NOTE — PROGRESS NOTES
Chief Complaint   Patient presents with   • Tingling     Tingling left side of face and left arm.         HPI  Lukasz Savage is a 54 y.o. male presents today with complaints of tingling in his left jaw and left arm from wrist up to elbow.  Says it lasts about 7-10 minutes each episode.  Says this his the 5th time in 4 weeks.  Denies any shortness of breath, denies chest pain, palpitations or vertigo.  Says he feels weird and has been having migraines.  Has not had any seizures, and has not had any leg pain.     Chronic problems: HTN elevated today has not taken carvedilol, spironolactone or clonidine, gout stable with colchicine for flairs, hyperlipidemia stable with fenofibrate, depression stable with venlafaxine and quetiapine, gerd stable with omeprazole, edema stable with prn furosemide    PCP:  Linda Hoffman, DNP, APRN    Allergies   Allergen Reactions   • Lipitor [Atorvastatin] Rash   • Lisinopril Angioedema       Past Medical History:   Diagnosis Date   • Angio-edema 7/3/2017   • Anxiety    • Arthritis    • Clostridium difficile colitis    • Erectile dysfunction 10/6/2016   • Gout    • HCAP (healthcare-associated pneumonia) 7/11/2017   • Hyperlipidemia    • Malignant hypertension    • MSSA (methicillin susceptible Staphylococcus aureus) infection 7/31/2017   • Obstructive sleep apnea    • Seizures (CMS/HCC) 7/4/2017       Past Surgical History:   Procedure Laterality Date   • TRACHEOSTOMY N/A 7/12/2017    Procedure: TRACHEOSTOMY WITH TRACHEOSCOPY;  Surgeon: Jairon Pierson MD;  Location: [facility] OR;  Service:        Social History     Socioeconomic History   • Marital status: Single     Spouse name: Not on file   • Number of children: Not on file   • Years of education: Not on file   • Highest education level: Not on file   Tobacco Use   • Smoking status: Former Smoker     Packs/day: 0.33     Years: 30.00     Pack years: 9.90     Types: Cigarettes     Last attempt to quit: 7/3/2017     Years  since quittin.6   • Smokeless tobacco: Never Used   Substance and Sexual Activity   • Alcohol use: No     Comment: PER DAY FOR 30 YEARS; Last drink July 3, 2017   • Drug use: No   • Sexual activity: Defer       Family History   Problem Relation Age of Onset   • Hypertension Mother    • Diabetes Mother    • Heart disease Father    • Hypertension Father    • No Known Problems Daughter    • No Known Problems Son    • Diabetes Maternal Grandmother    • No Known Problems Maternal Grandfather    • No Known Problems Paternal Grandmother    • Heart attack Paternal Grandfather    • Kidney disease Sister        Current Outpatient Medications on File Prior to Visit   Medication Sig Dispense Refill   • allopurinol (ZYLOPRIM) 100 MG tablet Take 100 mg by mouth Daily.     • carvedilol (COREG) 25 MG tablet Take 1 tablet by mouth 2 (Two) Times a Day With Meals. 180 tablet 1   • CloNIDine (CATAPRES) 0.1 MG tablet Take 1 tablet by mouth Every Night. Check BP 4x daily. If> 180 SBP take x1. 60 tablet 2   • colchicine 0.6 MG tablet Take 2 tabs now, repeat 1 tab in an hour.  May repeat same steps again in 24 hours if needed.  He has chronic gout 30 tablet 2   • fenofibrate (TRICOR) 145 MG tablet Take 1 tablet by mouth Daily. 90 tablet 1   • furosemide (LASIX) 20 MG tablet TAKE ONE TABLET BY MOUTH DAILY 30 tablet 0   • losartan (COZAAR) 100 MG tablet Take 1 tablet by mouth Daily. For high blood pressure 90 tablet 1   • Omega-3 Fatty Acids (FISH OIL) 1000 MG capsule capsule Take 2 capsules by mouth Daily With Breakfast. 180 capsule 1   • omeprazole (PRILOSEC) 10 MG capsule Take 1 capsule by mouth Daily. For acid reflux 90 capsule 1   • spironolactone (ALDACTONE) 25 MG tablet Take 1 tablet by mouth Daily. 90 tablet 1   • vardenafil (LEVITRA) 10 MG tablet Take 1 tablet by mouth Daily As Needed for erectile dysfunction. 10 tablet 5   • venlafaxine XR (EFFEXOR-XR) 150 MG 24 hr capsule Take 1 capsule by mouth Daily. 90 capsule 1   •  "[DISCONTINUED] QUEtiapine (SEROquel) 100 MG tablet Take 1 tablet by mouth every night at bedtime. 90 tablet 0     No current facility-administered medications on file prior to visit.         REVIEW OF SYMPTOMS: (Positives bolded)  General:  weight loss, fever, chills, night sweats, fatigue, appetite loss  HEENT:  blurry vision, eye pain, eye discharge, dry eyes, decreased vision  Respiratory: shortness of breath, cough, hemoptysis, wheezing, pleurisy,   Cardiovascular:  chest pain, PND, palpitation, edema, orthopnea, syncope, swelling of extremities  Gastro: Nausea, vomiting, diarrhea, hematemesis, abdominal pain, constipation  Genito: hematuria, dysuria, glycosuria, hesitancy, frequency, incontinence  Musckelo: Arthralgia, myalgia, muscle weakness, joint swelling, NSAID use  Skin: rash, pruritis, sores, nail changes, skin thickening, change in wart/mole, itching, rash, new lesions, pruritus, nail changes  Neuro:  Migraine, numbness, ataxia, tremor, vertigo, weakness, memory loss, tingling side of face and left arm  \"All other systems reviewed and negative, except as listed above.”      OBJECTIVE:  Constitutional:  Appearance-No acute distress, Consistent with stated age. Orientation- Oriented x 3, alert Posture-Not doubled over. Gait-Normal pace, normal arm movement. Posture- Normal Build and Nutrition-Well developed and well nourished.  General- Patient is pleasant and cooperative with the interview and exam.    Integumentary: General-No rashes, ulcers or lesions. No edema.  Palpation- Normal skin moisture/turgor. Skin is warm to touch, no increased warmth. Capillary refill is normal bilateral Upper and lower extremity.     Head/Neck: Head- normocephalic and atraumatic.  Neck- without visible/palpable lumps or pulsations.  Palpation- No bony tenderness about head/neck along frontal, occiptial, temporal, parietal, mastoid, jawline, zygoma, orbit or any other location.  NO temporal artery tenderness. No TMJ " tenderness. Normal cervical ROM.   Neck Supple.  Thyroid-No thyromegaly, no nodules    CHEST/LUNG: Inspection- symmetric chest wall no pectus deformity. Normal effort, no distress, no use of accessory muscles. Palpation- nontender sternum, ribline.  No abnormal pulsations. Auscultation- Breath sounds normal throughout all lung fields.  Normal tracheal sounds, Normal bronchial sounds overlying sternum, Bronchovessicular sounds normal between scapulae posteriorly, Normal vessicular breath sounds heard throughout periphery. Lungs are clear today. Adventitious sounds- No wheezes, rales, rhonchi.     CARDIOVASCULAR:  Carotid artery- normal, no bruits or abnormal pulsations. Jugular vein- no pulsations. Palpation/Percussion- Normal PMI, no palpable thrill  Auscultation- Regular rate and rhythm. No murmur noted in sitting, supine positions. Extremities- no digital clubbing, cyanosis, edema, increased warmth.    ABDOMEN: Inspection- normal and no visible pulsations. Normal contour. Auscultation- Bowel sounds normal, no abdominal bruits. Palpation/Percussion- soft, non-tender, no rebound tenderness, no rigidity (guarding), no jar tenderness, no masses.  Liver-no hepatomegaly, Spleen - no splenomegaly, Hernias- none. Rectal not examined.     Peripheral Vascular: Upper extremity Left- Normal temperature with pink nailbeds and no ulcerations.  Upper extremity Right- Normal temperature with pink nailbeds and no ulcerations.  Lower extremity- Normal temperature with pink nailbeds and no ulcerations. DP pulses 2+ bilaterally.  Pedal hair intact.  Normal capillary refill. Edema- No edema.    Musculoskeletal:  digital clubbing or cyanosis, neurovascularly intact all four extremities.  Upper extremity- Symmetrical posture.  No visible deformity.  Normal sensation along medial and lateral upper extremity proximally and distally.  NO tenderness overlying shoulder, lateral/medial epicondyle.  5/5 and strength 5/5 bilateral UE.   Elbow palpated, no tenderness overlying olecranon.  Normal supination, pronation to active/passive ROM and to resisted rotation. Bicep insertion/tricep insertion appear normal without obvious pathology. Rotator cuff evaluated and intact.  Normal wrist ROM bilaterally. Normal hand movement, intrinsic muscles of hands normal. No tenderness to palpation of hands/wrists/elbows.  Lower extremity- Not tender to palpation, no pain, no swelling, edema or erythema of surrounding tissue, normal strength and tone.  Normal appearing hip ROM bilaterally without pain.  Knee ROM normal at 0-120 degrees. No tenderness overlying trochanters, no tenderness about patella, quad tendon, patellar tendon.  No tenderness at tibial tuberosity. Ankle normal ROM not tender to palpation along medial/lateral malleolus. Normal movement of toes, no tenderness bilateral feet/toes.  Normal foot type. Calves symmetrical.  Stretching demonstrated today.  Spine/Ribs- No deformities, masses or tenderness, no known fractures, normal strength, Normal ROM. Normal stability No tenderness along C/T/L spine.  Normal appearing ROM about spine.      Neurological: General- Moves all 4 extremities symmetrically. Symmetrical face and body posture. Cranial nerves- individually evaluated II-XII and intact. PERRLA, Normal EOMI, visual/special senses appear intact, Face is symmetrical and normal sensation/movement, normal tongue, normal strength/posture of neck musculature. Balance- Romberg intact.  Reflexes- ntact with DTR 2+ patellar, Achilles, bicep, brachial,tricep. Ankle clonus normal with 2 beats.  Strength- 5/5 bilateral UE and LE. Soft touch- intact bilateral UE and LE.  Temperature sensation- intact bilateral UE and LE. Cerebellar testing-Rapid alternating movements intact.  Heel shin intact. Able to walk normal gait, normal heel toe walking. Neck- supple.      Neuropsych: Oriented- Person, place, time. (AAOx3), Mood/affect- normal and congruent. Able to  articulate well. Speech-Normal speech, normal rate, normal tone, normal use of language, volume and coherence.  Thought content- normal with ability to perform basic computations and apply abstract thought/reason. Associations- intact, no SI/HI, no hallucinations, delusions, obsessions.  Judgment/insight- Appropriate. Memory-Recall intact, remote and recent memory intact. Knowledge- Age appropriate fund of knowledge, concentration and attention span normal.    Lymphatic: Head/Neck- normal size and non tender to palpation. Axillary- Head and neck LN are normal size and non tender to palpation. Femoral and Inguinal- normal size and non tender to palpation.      Assessment/Plan:  Lukasz was seen today for tingling.    Diagnoses and all orders for this visit:    Tingling of left arm and left side of face  Migraine without status migrainosus, not intractable, unspecified migraine type        -      Referral to neuro    -     ECG 12 Lead      ECG 12 Lead  Date/Time: 2/22/2019 1:30 PM  Performed by: Linda Hoffman DNP, EPI  Authorized by: Linda Hoffman DNP, APRDICKSON   Comparison: compared with previous ECG from 7/4/2017  Comparison to previous ECG: Other EKG was abnormal this one was normal  Rhythm: sinus rhythm  Rate: normal  BPM: 82  Conduction: conduction normal  ST Segments: ST segments normal  T Waves: T waves normal  QRS axis: normal  Other: no other findings    Clinical impression: normal ECG            Moderate episode of recurrent major depressive disorder (CMS/HCC)  -     QUEtiapine (SEROquel) 100 MG tablet; Take 1 tablet by mouth every night at bedtime.    Seizures (CMS/HCC)        -     No seizures     Claudication (CMS/HCC)        -      Denies any problems       Discussed with pt if symptoms worsen go directly to ER.     Morbid obesity:  BMI: 30.9      Weight:    203    Follow up plan:  Lose at least 3 pounds before next chronic visit, exercise at least 3 times a week for 30 minute increments, eat  baked instead of fried foods, and eat healthier     -  Documentation of education   The patient BMI is outside this range and we recommended/discussed today to utilize a diet/exercise program to get back into the appropriate range.  Federal guidelines recommend that people under the age of 65 should have a BMI of 18.5-24.9  Food diary:  Bring to next visit  tial step is to document everything that is consumed. Pt's that have a food diary  Lose 2x the weight  by keeping track of foods.   Choose one bad food weekly and eliminate it from your diet.  Replace with one healthy food  Exercise diary: Goal over next 2-4 weeks is walk 30 minutes per day 5 days per week at pace difficult to hold conversation.   Drink more water, less soda.   Cut back on portion sizes.   Today we encouraged roughly a 1 lb per week weight loss with initial goal of 5% weight loss.  Avoid fast foods, eat more salads  If eating out for a meal, consider cutting food in half and placing into a to go container.  Individually portion any foods coming into the home   Use smaller plates  Drink 12 ozof water 30 minutes before meal as way to suppress appetite.  Discussed Contrave, metformin, topamax, Belviq and the cost of medications  Encouraged internet programs or self help books  Set  Goals that are realistic   Educated on ways to measure food  Baseball: 1 cup good for green salad, frozen yogurt, medium piece of fruit  Handful:  ½ cup good cut fruit, cooked vegetables, pasta, rice  Egg:  ¼ cup good for dried fruit  Deck of cards: 3 ounces good for meat and poultry  Check book:  3 ounces grilled fish  Plate Method:  reduce plate size to 9 inch dinner plate.  Half of plate should be filled with non-starchy vegetables (broccoli, lettuce, cauliflower, tomatoes), ¼ plate with lean source of protein (lean chicken, turkey, fish), remaining ½ with whole grains (brown rice, potato, whole grain breads  Avoid liquid calories (regular soda, juice, coffee with  cream)  Focus  on water, seltzer water and other non-calorie drinks  Replace regular sugar with non-caloric sweeteners  Avoid skipping meals: plan small regular meals throughout the day in order to keep your hunger controlled  Consider using meal replacements if unable to plan a healthy meal (protein shake, high protein bar)  Replace all white bread with whole wheat/whole grain alternative  Swap regular salad dressings (mayonnaise, butter, or low fat or fat free alternative)  Avoid high fat, high calorie, high carbohydrate snakes (cookies, pastries, cakes)  Snack on fruits, low fat dairy (yogurt, cottage cheese)            Management plan:  Take medications as prescribed; return to the clinic of new or worsening concerns.       Risks/benefits of current and new medications discussed with the patient and or family today.  The patient/family are aware and accept that if there any side effects they should call or return to clinic as soon as possible.  Appropriate F/U discussed for topics addressed today. All questions were answered to the satisfactory state of patient/family.  Should symptoms fail to improve or worsen they agree to call or return to clinic or to go to the ER. Education handouts were offered on any new Rx if requested.  Discussed the importance of following up with any needed screening tests/labs/specialist appointments and any requested follow-up recommended by me today.  Importance of maintaining follow-up discussed and patient accepts that missed appointments can delay diagnosis and potentially lead to worsening of conditions.    Return in about 1 month (around 3/22/2019) for Recheck migraine.    Linda Hoffman, DNP, APRN  2/22/2019

## 2019-02-28 ENCOUNTER — OFFICE VISIT (OUTPATIENT)
Dept: NEUROLOGY | Facility: CLINIC | Age: 54
End: 2019-02-28

## 2019-02-28 VITALS
BODY MASS INDEX: 31.07 KG/M2 | HEIGHT: 68 IN | DIASTOLIC BLOOD PRESSURE: 78 MMHG | SYSTOLIC BLOOD PRESSURE: 136 MMHG | WEIGHT: 205 LBS | HEART RATE: 80 BPM

## 2019-02-28 DIAGNOSIS — Z87.898 HISTORY OF SEIZURES: ICD-10-CM

## 2019-02-28 DIAGNOSIS — R29.818 TRANSIENT NEUROLOGIC DEFICIT: Primary | ICD-10-CM

## 2019-02-28 DIAGNOSIS — I67.83 POSTERIOR REVERSIBLE ENCEPHALOPATHY SYNDROME (PRES): ICD-10-CM

## 2019-02-28 DIAGNOSIS — G43.009 MIGRAINE WITHOUT AURA AND WITHOUT STATUS MIGRAINOSUS, NOT INTRACTABLE: ICD-10-CM

## 2019-02-28 PROCEDURE — 99214 OFFICE O/P EST MOD 30 MIN: CPT | Performed by: PHYSICIAN ASSISTANT

## 2019-03-20 ENCOUNTER — APPOINTMENT (OUTPATIENT)
Dept: NEUROLOGY | Facility: HOSPITAL | Age: 54
End: 2019-03-20

## 2019-03-20 ENCOUNTER — APPOINTMENT (OUTPATIENT)
Dept: MRI IMAGING | Facility: HOSPITAL | Age: 54
End: 2019-03-20

## 2019-03-20 ENCOUNTER — APPOINTMENT (OUTPATIENT)
Dept: ULTRASOUND IMAGING | Facility: HOSPITAL | Age: 54
End: 2019-03-20

## 2019-03-25 ENCOUNTER — NURSE TRIAGE (OUTPATIENT)
Dept: CALL CENTER | Facility: HOSPITAL | Age: 54
End: 2019-03-25

## 2019-03-25 NOTE — TELEPHONE ENCOUNTER
"    Reason for Disposition  • Question about upcoming scheduled test, no triage required and triager able to answer question    Additional Information  • Negative: [1] Caller is not with the adult (patient) AND [2] reporting urgent symptoms  • Negative: Lab result questions  • Negative: Medication questions  • Negative: Caller cannot be reached by phone  • Negative: Caller has already spoken to PCP or another triager  • Negative: RN needs further essential information from caller in order to complete triage  • Negative: Requesting regular office appointment  • Negative: [1] Caller requesting NON-URGENT health information AND [2] PCP's office is the best resource  • Negative: Health Information question, no triage required and triager able to answer question  • Negative: General information question, no triage required and triager able to answer question    Answer Assessment - Initial Assessment Questions  1. REASON FOR CALL or QUESTION: \"What is your reason for calling today?\" or \"How can I best help you?\" or \"What question do you have that I can help answer?\"      Time of arrival for test tomorrow    Protocols used: INFORMATION ONLY CALL-ADULT-      "

## 2019-03-26 ENCOUNTER — HOSPITAL ENCOUNTER (OUTPATIENT)
Dept: NEUROLOGY | Facility: HOSPITAL | Age: 54
Discharge: HOME | End: 2019-03-26
Admitting: PHYSICIAN ASSISTANT

## 2019-03-26 DIAGNOSIS — G43.009 MIGRAINE WITHOUT AURA AND WITHOUT STATUS MIGRAINOSUS, NOT INTRACTABLE: ICD-10-CM

## 2019-03-26 DIAGNOSIS — R29.818 TRANSIENT NEUROLOGIC DEFICIT: ICD-10-CM

## 2019-03-26 DIAGNOSIS — Z87.898 HISTORY OF SEIZURES: ICD-10-CM

## 2019-03-26 PROCEDURE — 95816 EEG AWAKE AND DROWSY: CPT | Performed by: PSYCHIATRY & NEUROLOGY

## 2019-03-26 PROCEDURE — 95816 EEG AWAKE AND DROWSY: CPT

## 2019-03-31 NOTE — PROGRESS NOTES
Subjective   Lukasz Savage is a 54 y.o. male is here today for follow-up.    History of PRES with seizures, AED D/C'd 9/2017 with no further seizure.  Last ETOH 7/2017.  New symptoms over the last 3 weeks including numbness on the left side of his body including the face.  Some weakness in the left side of the face.  The symptoms are associated with headache.  This headache tends to dominate on the right but will eventually involve both sides.  No tonic-clonic seizure activity or persistent neurologic deficit.  Relates that these episodes are brief, lasting 4-8 minutes and resolving completely.  No recent testing.      Neurologic Problem   The patient's primary symptoms include focal sensory loss, focal weakness, a loss of balance, memory loss and slurred speech. The patient's pertinent negatives include no weakness. This is a new problem. The current episode started 1 to 4 weeks ago. The neurological problem developed suddenly. The problem is unchanged. There was left-sided focality noted. Associated symptoms include fatigue, headaches, light-headedness and nausea. Past treatments include sleep, bed rest, position change and acetaminophen. The treatment provided no relief. (PRES 2017)   Migraine    This is a new problem. The current episode started more than 1 year ago. The problem occurs intermittently. The problem has been unchanged. The pain is located in the left unilateral region. The pain does not radiate. The pain quality is similar to prior headaches. The pain is severe. Associated symptoms include a loss of balance, nausea, phonophobia, photophobia and scalp tenderness. Pertinent negatives include no weakness. The symptoms are aggravated by activity, bright light, noise and Valsalva. He has tried acetaminophen, antidepressants and NSAIDs for the symptoms. The treatment provided no relief. His past medical history is significant for migraine headaches. (PRES 2017)        The following portions of the  patient's history were reviewed and updated as appropriate: allergies, current medications, past family history, past medical history, past social history, past surgical history and problem list.    Review of Systems   Constitutional: Positive for fatigue.   HENT: Negative for trouble swallowing.    Eyes: Positive for photophobia.   Respiratory: Negative.    Cardiovascular: Negative.    Gastrointestinal: Positive for nausea.   Endocrine: Negative.    Genitourinary: Negative.    Musculoskeletal: Negative.    Skin: Negative.    Allergic/Immunologic: Negative.    Neurological: Positive for focal weakness, light-headedness and loss of balance. Negative for facial asymmetry, speech difficulty and weakness.   Hematological: Negative.    Psychiatric/Behavioral: Positive for dysphoric mood, memory loss and sleep disturbance.       Objective   Physical Exam   Constitutional: He is oriented to person, place, and time.   HENT:   Head: Normocephalic.   Right Ear: Hearing, tympanic membrane, external ear and ear canal normal.   Left Ear: Hearing, tympanic membrane, external ear and ear canal normal.   Nose: Nose normal.   Mouth/Throat: Uvula is midline, oropharynx is clear and moist and mucous membranes are normal.   Eyes: Conjunctivae, EOM and lids are normal. Pupils are equal, round, and reactive to light.   Fundoscopic exam:       The right eye shows no hemorrhage and no papilledema. The right eye shows red reflex.        The left eye shows no hemorrhage and no papilledema. The left eye shows red reflex.   Neck: Trachea normal, normal range of motion and phonation normal. No JVD present. Carotid bruit is not present.   Cardiovascular: Normal rate, regular rhythm and normal heart sounds.   No murmur heard.  Pulmonary/Chest: Effort normal and breath sounds normal.   Abdominal: Soft. Normal appearance.   Neurological: He is alert and oriented to person, place, and time. He has normal strength. He displays no atrophy and no  tremor. No cranial nerve deficit or sensory deficit. He exhibits normal muscle tone. Coordination and gait normal. GCS eye subscore is 4. GCS verbal subscore is 5. GCS motor subscore is 6.   Reflex Scores:       Tricep reflexes are 2+ on the right side and 2+ on the left side.       Bicep reflexes are 2+ on the right side and 2+ on the left side.       Brachioradialis reflexes are 2+ on the right side and 2+ on the left side.       Patellar reflexes are 2+ on the right side and 2+ on the left side.       Achilles reflexes are 2+ on the right side and 2+ on the left side.  Skin: Skin is warm, dry and intact.   Psychiatric: He has a normal mood and affect. His speech is normal and behavior is normal. Judgment and thought content normal. Cognition and memory are normal.   Nursing note and vitals reviewed.        Assessment/Plan   Lukasz was seen today for neurologic problem and migraine.    Diagnoses and all orders for this visit:    Transient neurologic deficit  -     MRI Brain With & Without Contrast; Future  -     US Carotid Bilateral; Future  -     EEG; Future  -     CBC & Differential  -     Comprehensive Metabolic Panel  -     Ammonia  -     aspirin 81 MG tablet; Take 1 tablet by mouth Daily.    Migraine without aura and without status migrainosus, not intractable  -     MRI Brain With & Without Contrast; Future  -     EEG; Future    Posterior reversible encephalopathy syndrome (PRES)  -     MRI Brain With & Without Contrast; Future    History of seizures  -     MRI Brain With & Without Contrast; Future  -     EEG; Future    Today's findings and expectations are reviewed with the patient.  The above testing recommendations are reviewed as well.  Repeat imaging of the brain and EEG as well as laboratory studies.  He will start an aspirin 81 mg/day.    Follow-up in 6 to 8 weeks.  Expectations, recommendations, safety issues, stroke signs and symptoms, patient and family questions are reviewed in detail  today.      20 minutes of a 25 minute outpatient visit was spent in counseling and coordination of care today.           Dictated utilizing Dragon dictation.

## 2019-04-02 ENCOUNTER — HOSPITAL ENCOUNTER (OUTPATIENT)
Dept: MRI IMAGING | Facility: HOSPITAL | Age: 54
Discharge: HOME | End: 2019-04-02

## 2019-04-02 ENCOUNTER — HOSPITAL ENCOUNTER (OUTPATIENT)
Dept: ULTRASOUND IMAGING | Facility: HOSPITAL | Age: 54
Discharge: HOME | End: 2019-04-02
Admitting: PHYSICIAN ASSISTANT

## 2019-04-02 DIAGNOSIS — R29.818 TRANSIENT NEUROLOGIC DEFICIT: ICD-10-CM

## 2019-04-02 DIAGNOSIS — Z87.898 HISTORY OF SEIZURES: ICD-10-CM

## 2019-04-02 DIAGNOSIS — G43.009 MIGRAINE WITHOUT AURA AND WITHOUT STATUS MIGRAINOSUS, NOT INTRACTABLE: ICD-10-CM

## 2019-04-02 DIAGNOSIS — I67.83 POSTERIOR REVERSIBLE ENCEPHALOPATHY SYNDROME (PRES): ICD-10-CM

## 2019-04-02 LAB — CREAT BLDA-MCNC: 1.5 MG/DL (ref 0.6–1.3)

## 2019-04-02 PROCEDURE — 93880 EXTRACRANIAL BILAT STUDY: CPT | Performed by: SURGERY

## 2019-04-02 PROCEDURE — 70553 MRI BRAIN STEM W/O & W/DYE: CPT

## 2019-04-02 PROCEDURE — 93880 EXTRACRANIAL BILAT STUDY: CPT

## 2019-04-02 PROCEDURE — 0 GADOBENATE DIMEGLUMINE 529 MG/ML SOLUTION: Performed by: PHYSICIAN ASSISTANT

## 2019-04-02 PROCEDURE — 82565 ASSAY OF CREATININE: CPT

## 2019-04-02 PROCEDURE — A9577 INJ MULTIHANCE: HCPCS | Performed by: PHYSICIAN ASSISTANT

## 2019-04-02 RX ADMIN — GADOBENATE DIMEGLUMINE 10 ML: 529 INJECTION, SOLUTION INTRAVENOUS at 12:22

## 2019-04-03 ENCOUNTER — OFFICE VISIT (OUTPATIENT)
Dept: NEUROLOGY | Facility: CLINIC | Age: 54
End: 2019-04-03

## 2019-04-03 ENCOUNTER — TELEPHONE (OUTPATIENT)
Dept: FAMILY MEDICINE CLINIC | Facility: CLINIC | Age: 54
End: 2019-04-03

## 2019-04-03 VITALS
SYSTOLIC BLOOD PRESSURE: 122 MMHG | DIASTOLIC BLOOD PRESSURE: 84 MMHG | WEIGHT: 207 LBS | HEIGHT: 68 IN | HEART RATE: 82 BPM | BODY MASS INDEX: 31.37 KG/M2

## 2019-04-03 DIAGNOSIS — D49.6 BRAIN NEOPLASM (HCC): Primary | ICD-10-CM

## 2019-04-03 DIAGNOSIS — R56.9 SEIZURES (HCC): ICD-10-CM

## 2019-04-03 PROCEDURE — 99214 OFFICE O/P EST MOD 30 MIN: CPT | Performed by: PHYSICIAN ASSISTANT

## 2019-04-03 RX ORDER — DEXAMETHASONE 2 MG/1
2 TABLET ORAL 2 TIMES DAILY WITH MEALS
Qty: 60 TABLET | Refills: 4 | Status: SHIPPED | OUTPATIENT
Start: 2019-04-03 | End: 2019-04-10

## 2019-04-03 RX ORDER — LEVETIRACETAM 500 MG/1
500 TABLET ORAL 2 TIMES DAILY
Qty: 60 TABLET | Refills: 3 | Status: SHIPPED | OUTPATIENT
Start: 2019-04-03 | End: 2019-04-10

## 2019-04-03 RX ORDER — MULTIVITAMIN WITH IRON
100 TABLET ORAL DAILY
Qty: 30 TABLET | Refills: 3 | Status: SHIPPED | OUTPATIENT
Start: 2019-04-03 | End: 2019-07-10 | Stop reason: SDUPTHER

## 2019-04-03 NOTE — PROGRESS NOTES
Subjective   Lukasz Savage is a 54 y.o. male is here today for follow-up.    History of PRES with seizures, AED D/C'd 9/2017 with no further seizure.  Last ETOH 7/2017.  New symptoms over the last 3 weeks including numbness on the left side of his body including the face.  Some weakness in the left side of the face.  The symptoms are associated with headache.  This headache tends to dominate on the right but will eventually involve both sides.  No tonic-clonic seizure activity or persistent neurologic deficit.  Relates that these episodes are brief, lasting 4-8 minutes and resolving completely.      Since last seen, he feels he has had increased difficulty with weakness in his left hand and continues to have occasional episodes of transient symptoms in the left upper extremity, face and at times left lower extremity.  Symptoms include involuntary twitching and moving of the left upper extremity, face on the left without loss of consciousness or generalized seizure-like activity.  The patient also has occasional stumbling involving the left lower extremity and will drop objects from his left hand.  He feels as though his headache has intensified somewhat.    28 March 2019 EEG did not show seizure activity.  2 April 2019 carotid vertebral ultrasound not showing any significant stenosis or issues.  2 April 2019 MRI of the brain shows substantial abnormalities.  Requested lab studies were not completed yet.    MRI of the brain with and without contrast from [facility] yesterday is reviewed with the patient and his family.  Previous MRIs from July 2017 and August 2017 are also reviewed.  The patient has changes in the right frontal lobe consistent with a likely primary, higher grade brain tumor.  Findings appear to be confined to the right side, there are 2 masslike structures in the posterior right frontal lobe, there is no midline shift.      Neurologic Problem   The patient's primary symptoms include  focal sensory loss, focal weakness, a loss of balance, memory loss, slurred speech and weakness. This is a chronic problem. The current episode started more than 1 month ago. The neurological problem developed suddenly. The problem is unchanged. There was left-sided focality noted. Associated symptoms include fatigue, headaches, light-headedness and nausea. Pertinent negatives include no vomiting. Past treatments include sleep, bed rest, position change and acetaminophen. The treatment provided no relief. (PRES 2017)   Migraine    This is a chronic problem. The current episode started more than 1 year ago. The problem occurs intermittently. The problem has been unchanged. The pain is located in the left unilateral region. The pain does not radiate. The pain quality is similar to prior headaches. The pain is severe. Associated symptoms include a loss of balance, nausea, phonophobia, photophobia, scalp tenderness and weakness. Pertinent negatives include no vomiting. The symptoms are aggravated by activity, bright light, noise and Valsalva. He has tried acetaminophen, antidepressants and NSAIDs for the symptoms. The treatment provided no relief. His past medical history is significant for migraine headaches. (PRES 2017)        The following portions of the patient's history were reviewed and updated as appropriate: allergies, current medications, past family history, past medical history, past social history, past surgical history and problem list.    Review of Systems   Constitutional: Positive for fatigue.   HENT: Negative for trouble swallowing.    Eyes: Positive for photophobia.   Respiratory: Negative.    Cardiovascular: Negative.    Gastrointestinal: Positive for nausea. Negative for vomiting.   Endocrine: Negative.    Genitourinary: Negative.    Musculoskeletal: Positive for gait problem.   Skin: Negative.    Allergic/Immunologic: Negative.    Neurological: Positive for focal weakness, weakness,  light-headedness, headache, loss of balance and memory problem. Negative for facial asymmetry and speech difficulty.   Hematological: Negative.    Psychiatric/Behavioral: Positive for dysphoric mood, memory loss and sleep disturbance.       Objective   Physical Exam   Constitutional: He is oriented to person, place, and time.   HENT:   Head: Normocephalic and atraumatic.   Right Ear: Hearing, tympanic membrane, external ear and ear canal normal.   Left Ear: Hearing, tympanic membrane, external ear and ear canal normal.   Nose: Nose normal.   Mouth/Throat: Uvula is midline, oropharynx is clear and moist and mucous membranes are normal.   Eyes: Conjunctivae, EOM and lids are normal. Pupils are equal, round, and reactive to light.   Fundoscopic exam:       The right eye shows no hemorrhage and no papilledema. The right eye shows red reflex.        The left eye shows no hemorrhage and no papilledema. The left eye shows red reflex.   Neck: Trachea normal, normal range of motion and phonation normal. No JVD present. Carotid bruit is not present.   Cardiovascular: Normal rate, regular rhythm and normal heart sounds.   No murmur heard.  Pulmonary/Chest: Effort normal and breath sounds normal.   Abdominal: Normal appearance.   Neurological: He is alert and oriented to person, place, and time. He displays no atrophy and no tremor. No cranial nerve deficit or sensory deficit. He exhibits normal muscle tone. Coordination and gait normal. GCS eye subscore is 4. GCS verbal subscore is 5. GCS motor subscore is 6.   Reflex Scores:       Tricep reflexes are 2+ on the right side and 2+ on the left side.       Bicep reflexes are 2+ on the right side and 2+ on the left side.       Brachioradialis reflexes are 2+ on the right side and 2+ on the left side.       Patellar reflexes are 2+ on the right side and 2+ on the left side.       Achilles reflexes are 2+ on the right side and 2+ on the left side.  Mild left  weakness, mild left  pronator drift.  Moving     Skin: Skin is warm, dry and intact.   Psychiatric: He has a normal mood and affect. His speech is normal and behavior is normal. Judgment and thought content normal. Cognition and memory are normal.   Nursing note and vitals reviewed.        Assessment/Plan   Lukasz was seen today for neurologic problem.    Diagnoses and all orders for this visit:    Brain neoplasm (CMS/HCC)  -     Ambulatory Referral to Neurosurgery  -     CT chest w wo contrast; Future  -     CT abdomen pelvis w contrast; Future  -     levETIRAcetam (KEPPRA) 500 MG tablet; Take 1 tablet by mouth 2 (Two) Times a Day.  -     vitamin B-6 (PYRIDOXINE) 100 MG tablet; Take 1 tablet by mouth Daily.  -     dexamethasone (DECADRON) 2 MG tablet; Take 1 tablet by mouth 2 (Two) Times a Day With Meals.    Seizures (CMS/HCC)    The patient appears to have a primary brain tumor in the right frontal lobe.  There are 2 masslike structures with changes typical of a higher grade astrocytoma or perhaps glioblastoma.  Other etiologies are considered.    CT scans of the chest, abdomen and pelvis are recommended.  This is explained to the patient and his family.  The patient is agreeable to proceed with this.  Patient also needs to obtain previously ordered blood work.    The patient will resume Keppra 500 mg twice per day.  Vitamin B6 100 mg daily is recommended.  Decadron 2 mg twice per day is also recommended.    Findings, expectations, follow-up study recommendations, neurosurgery consultation, medication recommendations and potential side effects, seizure precautions including avoidance of driving, operation of heavy machinery, operation of off-road vehicles, working at heights, working around water, etc. patient questions, family questions and follow-up appointment date and time are discussed in detail with the patient and his family.  The patient's primary support person is his mother.    Patient and his mother are advised to present  to the emergency department or call with any significant changes in condition neurologically.  They expressed understanding.      20 minutes of a 25 minute outpatient visit was spent in counseling and coordination of care today.           Dictated utilizing Dragon dictation.

## 2019-04-05 LAB
BASOPHILS # BLD AUTO: 0.05 10*3/MM3 (ref 0–0.2)
BASOPHILS NFR BLD AUTO: 0.5 % (ref 0–1.5)
EOSINOPHIL # BLD AUTO: 0.11 10*3/MM3 (ref 0–0.4)
EOSINOPHIL NFR BLD AUTO: 1 % (ref 0.3–6.2)
ERYTHROCYTE [DISTWIDTH] IN BLOOD BY AUTOMATED COUNT: 15.3 % (ref 12.3–15.4)
HCT VFR BLD AUTO: 42.5 % (ref 37.5–51)
HGB BLD-MCNC: 13.4 G/DL (ref 13–17.7)
IMM GRANULOCYTES # BLD AUTO: 0.05 10*3/MM3 (ref 0–0.05)
IMM GRANULOCYTES NFR BLD AUTO: 0.5 % (ref 0–0.5)
LYMPHOCYTES # BLD AUTO: 1.45 10*3/MM3 (ref 0.7–3.1)
LYMPHOCYTES NFR BLD AUTO: 13.5 % (ref 19.6–45.3)
MCH RBC QN AUTO: 29.1 PG (ref 26.6–33)
MCHC RBC AUTO-ENTMCNC: 31.5 G/DL (ref 31.5–35.7)
MCV RBC AUTO: 92.2 FL (ref 79–97)
MONOCYTES # BLD AUTO: 0.5 10*3/MM3 (ref 0.1–0.9)
MONOCYTES NFR BLD AUTO: 4.6 % (ref 5–12)
NEUTROPHILS # BLD AUTO: 8.61 10*3/MM3 (ref 1.4–7)
NEUTROPHILS NFR BLD AUTO: 79.9 % (ref 42.7–76)
NRBC BLD AUTO-RTO: 0.1 /100 WBC (ref 0–0)
PLATELET # BLD AUTO: 421 10*3/MM3 (ref 140–450)
RBC # BLD AUTO: 4.61 10*6/MM3 (ref 4.14–5.8)
RETICS/RBC NFR AUTO: 1.85 % (ref 0.5–1.5)
WBC # BLD AUTO: 10.77 10*3/MM3 (ref 3.4–10.8)

## 2019-04-08 ENCOUNTER — OFFICE VISIT (OUTPATIENT)
Dept: NEUROSURGERY | Facility: CLINIC | Age: 54
End: 2019-04-08

## 2019-04-08 VITALS
SYSTOLIC BLOOD PRESSURE: 128 MMHG | BODY MASS INDEX: 30.07 KG/M2 | WEIGHT: 198.4 LBS | DIASTOLIC BLOOD PRESSURE: 80 MMHG | HEIGHT: 68 IN

## 2019-04-08 DIAGNOSIS — F17.210 CIGARETTE SMOKER: ICD-10-CM

## 2019-04-08 DIAGNOSIS — R56.9 SEIZURES (HCC): ICD-10-CM

## 2019-04-08 DIAGNOSIS — D43.2 NEOPLASM OF UNCERTAIN BEHAVIOR OF BRAIN (HCC): Primary | ICD-10-CM

## 2019-04-08 PROBLEM — D49.6 BRAIN NEOPLASM (HCC): Status: RESOLVED | Noted: 2019-04-03 | Resolved: 2019-04-08

## 2019-04-08 PROCEDURE — 99204 OFFICE O/P NEW MOD 45 MIN: CPT | Performed by: NEUROLOGICAL SURGERY

## 2019-04-08 RX ORDER — DEXAMETHASONE 1 MG
TABLET ORAL
Refills: 4 | COMMUNITY
Start: 2019-04-03 | End: 2019-04-09

## 2019-04-08 NOTE — PATIENT INSTRUCTIONS
PATIENT TO CONTINUE TO FOLLOW UP WITH HIS PRIMARY CARE PROVIDER FOR YEARLY PHYSICAL EXAMS TO ENSURE COMPLETE HEALTH MAINTENANCE      BMI for Adults  Body mass index (BMI) is a number that is calculated from a person's weight and height. In most adults, the number is used to find how much of an adult's weight is made up of fat. BMI is not as accurate as a direct measure of body fat.  How is BMI calculated?  BMI is calculated by dividing weight in kilograms by height in meters squared. It can also be calculated by dividing weight in pounds by height in inches squared, then multiplying the resulting number by 703. Charts are available to help you find your BMI quickly and easily without doing this calculation.  How is BMI interpreted?  Health care professionals use BMI charts to identify whether an adult is underweight, at a normal weight, or overweight based on the following guidelines:  · Underweight: BMI less than 18.5.  · Normal weight: BMI between 18.5 and 24.9.  · Overweight: BMI between 25 and 29.9.  · Obese: BMI of 30 and above.    BMI is usually interpreted the same for males and females.  Weight includes both fat and muscle, so someone with a muscular build, such as an athlete, may have a BMI that is higher than 24.9. In cases like these, BMI may not accurately depict body fat. To determine if excess body fat is the cause of a BMI of 25 or higher, further assessments may need to be done by a health care provider.  Why is BMI a useful tool?  BMI is used to identify a possible weight problem that may be related to a medical problem or may increase the risk for medical problems. BMI can also be used to promote changes to reach a healthy weight.  This information is not intended to replace advice given to you by your health care provider. Make sure you discuss any questions you have with your health care provider.  Document Released: 08/29/2005 Document Revised: 04/27/2017 Document Reviewed: 05/15/2015  Andrew  Interactive Patient Education © 2018 [employer]        Steps to Quit Smoking  Smoking tobacco can be harmful to your health and can affect almost every organ in your body. Smoking puts you, and those around you, at risk for developing many serious chronic diseases. Quitting smoking is difficult, but it is one of the best things that you can do for your health. It is never too late to quit.  What are the benefits of quitting smoking?  When you quit smoking, you lower your risk of developing serious diseases and conditions, such as:  · Lung cancer or lung disease, such as COPD.  · Heart disease.  · Stroke.  · Heart attack.  · Infertility.  · Osteoporosis and bone fractures.    Additionally, symptoms such as coughing, wheezing, and shortness of breath may get better when you quit. You may also find that you get sick less often because your body is stronger at fighting off colds and infections. If you are pregnant, quitting smoking can help to reduce your chances of having a baby of low birth weight.  How do I get ready to quit?  When you decide to quit smoking, create a plan to make sure that you are successful. Before you quit:  · Pick a date to quit. Set a date within the next two weeks to give you time to prepare.  · Write down the reasons why you are quitting. Keep this list in places where you will see it often, such as on your bathroom mirror or in your car or wallet.  · Identify the people, places, things, and activities that make you want to smoke (triggers) and avoid them. Make sure to take these actions:  ? Throw away all cigarettes at home, at work, and in your car.  ? Throw away smoking accessories, such as ashtrays and lighters.  ? Clean your car and make sure to empty the ashtray.  ? Clean your home, including curtains and carpets.  · Tell your family, friends, and coworkers that you are quitting. Support from your loved ones can make quitting easier.  · Talk with your health care provider about your  options for quitting smoking.  · Find out what treatment options are covered by your health insurance.    What strategies can I use to quit smoking?  Talk with your healthcare provider about different strategies to quit smoking. Some strategies include:  · Quitting smoking altogether instead of gradually lessening how much you smoke over a period of time. Research shows that quitting “cold turkey” is more successful than gradually quitting.  · Attending in-person counseling to help you build problem-solving skills. You are more likely to have success in quitting if you attend several counseling sessions. Even short sessions of 10 minutes can be effective.  · Finding resources and support systems that can help you to quit smoking and remain smoke-free after you quit. These resources are most helpful when you use them often. They can include:  ? Online chats with a counselor.  ? Telephone quitlines.  ? Printed self-help materials.  ? Support groups or group counseling.  ? Text messaging programs.  ? Mobile phone applications.  · Taking medicines to help you quit smoking. (If you are pregnant or breastfeeding, talk with your health care provider first.) Some medicines contain nicotine and some do not. Both types of medicines help with cravings, but the medicines that include nicotine help to relieve withdrawal symptoms. Your health care provider may recommend:  ? Nicotine patches, gum, or lozenges.  ? Nicotine inhalers or sprays.  ? Non-nicotine medicine that is taken by mouth.    Talk with your health care provider about combining strategies, such as taking medicines while you are also receiving in-person counseling. Using these two strategies together makes you more likely to succeed in quitting than if you used either strategy on its own.  If you are pregnant or breastfeeding, talk with your health care provider about finding counseling or other support strategies to quit smoking. Do not take medicine to help you  quit smoking unless told to do so by your health care provider.  What things can I do to make it easier to quit?  Quitting smoking might feel overwhelming at first, but there is a lot that you can do to make it easier. Take these important actions:  · Reach out to your family and friends and ask that they support and encourage you during this time. Call telephone quitlines, reach out to support groups, or work with a counselor for support.  · Ask people who smoke to avoid smoking around you.  · Avoid places that trigger you to smoke, such as bars, parties, or smoke-break areas at work.  · Spend time around people who do not smoke.  · Lessen stress in your life, because stress can be a smoking trigger for some people. To lessen stress, try:  ? Exercising regularly.  ? Deep-breathing exercises.  ? Yoga.  ? Meditating.  ? Performing a body scan. This involves closing your eyes, scanning your body from head to toe, and noticing which parts of your body are particularly tense. Purposefully relax the muscles in those areas.  · Download or purchase mobile phone or tablet apps (applications) that can help you stick to your quit plan by providing reminders, tips, and encouragement. There are many free apps, such as QuitGuide from the CDC (Centers for Disease Control and Prevention). You can find other support for quitting smoking (smoking cessation) through smokefree.gov and other websites.    How will I feel when I quit smoking?  Within the first 24 hours of quitting smoking, you may start to feel some withdrawal symptoms. These symptoms are usually most noticeable 2-3 days after quitting, but they usually do not last beyond 2-3 weeks. Changes or symptoms that you might experience include:  · Mood swings.  · Restlessness, anxiety, or irritation.  · Difficulty concentrating.  · Dizziness.  · Strong cravings for sugary foods in addition to nicotine.  · Mild weight gain.  · Constipation.  · Nausea.  · Coughing or a sore  throat.  · Changes in how your medicines work in your body.  · A depressed mood.  · Difficulty sleeping (insomnia).    After the first 2-3 weeks of quitting, you may start to notice more positive results, such as:  · Improved sense of smell and taste.  · Decreased coughing and sore throat.  · Slower heart rate.  · Lower blood pressure.  · Clearer skin.  · The ability to breathe more easily.  · Fewer sick days.    Quitting smoking is very challenging for most people. Do not get discouraged if you are not successful the first time. Some people need to make many attempts to quit before they achieve long-term success. Do your best to stick to your quit plan, and talk with your health care provider if you have any questions or concerns.  This information is not intended to replace advice given to you by your health care provider. Make sure you discuss any questions you have with your health care provider.  Document Released: 12/12/2002 Document Revised: 07/24/2018 Document Reviewed: 05/03/2016  [employer] Interactive Patient Education © 2019 [employer]        **PATIENT IS STOP THE DECADRON (STEROID)**

## 2019-04-08 NOTE — PROGRESS NOTES
Patient: Lukasz Savage  : 1965    Primary Care Provider: Linda Hoffman, DNP, APRN    Requesting Provider: Royal Todd*        History    Chief Complaint: seizure   Chief Complaint   Patient presents with   • Brain Tumor     patient has imaging @ [facility] and is here to discuss results/recommendations       History of Present Illness: 54-year-old gentleman who presents from his neurology office after left facial and upper extremity focal motor seizure.  MRI was read as abnormal he is here to discuss the MRI results.  In 2017 he was hospitalized for PRES syndrome.  Presented as a seizure at that time and was intubated trached with an extended hospital stay and recovery.  He did very well however and fully recovered.  In 2019 he started to develop left facial twitching and left upper extremity twitching.  He has been started on Keppra 500 mg twice daily for just a few days.  He has not seen any improvement in the focal motor seizures.  He also relates some difficulty with speech and focus and concentration.  He is currently not driving he also is currently not working.  He denies any headaches no nausea no vomiting.    Review of Systems   Neurological: Positive for seizures.   All other systems reviewed and are negative.      Past Medical History:   Past Medical History:   Diagnosis Date   • Angio-edema 7/3/2017   • Anxiety    • Arthritis    • Clostridium difficile colitis    • Erectile dysfunction 10/6/2016   • Gout    • HCAP (healthcare-associated pneumonia) 2017   • Hyperlipidemia    • Malignant hypertension    • MSSA (methicillin susceptible Staphylococcus aureus) infection 2017   • Obstructive sleep apnea    • Seizures (CMS/HCC) 2017       Past Surgical History:   Past Surgical History:   Procedure Laterality Date   • TRACHEOSTOMY N/A 2017    Procedure: TRACHEOSTOMY WITH TRACHEOSCOPY;  Surgeon: Jairon Pierson MD;  Location: [facility] OR;  Service:         Family History: family history includes Diabetes in his maternal grandmother and mother; Heart attack in his paternal grandfather; Heart disease in his father; Hypertension in his father and mother; Kidney disease in his sister; No Known Problems in his daughter, maternal grandfather, paternal grandmother, and son.    Social History:  reports that he quit smoking about 21 months ago. His smoking use included cigarettes. He has a 9.90 pack-year smoking history. He has never used smokeless tobacco. He reports that he does not drink alcohol or use drugs.    Medications:    (Not in a hospital admission)    Allergies:  Lipitor [atorvastatin] and Lisinopril    Physical Exam:     Physical Exam   Constitutional: He is oriented to person, place, and time. He appears well-developed and well-nourished.   Eyes: EOM are normal. Pupils are equal, round, and reactive to light.   Cardiovascular: Normal rate and regular rhythm.   Pulmonary/Chest: Effort normal. No respiratory distress.   Abdominal: Soft. He exhibits no distension. There is no tenderness.   Neurological: He is oriented to person, place, and time. He has normal strength. He has a normal Finger-Nose-Finger Test, a normal Romberg Test and a normal Tandem Gait Test. Gait normal.   Reflex Scores:       Tricep reflexes are 3+ on the right side and 3+ on the left side.       Bicep reflexes are 3+ on the right side and 3+ on the left side.       Patellar reflexes are 3+ on the right side and 3+ on the left side.       Achilles reflexes are 3+ on the right side and 3+ on the left side.  Psychiatric: His speech is normal.       Neurologic Exam     Mental Status   Oriented to person, place, and time.   Attention: normal.   Speech: speech is normal   Level of consciousness: alert  Knowledge: good.     Cranial Nerves     CN II   Visual fields full to confrontation.     CN III, IV, VI   Pupils are equal, round, and reactive to light.  Extraocular motions are normal.     CN V    Facial sensation intact.     CN VII   Right facial weakness: none  Left facial weakness: Left central facial asymmetry on inspection but no true weakness per se.    CN VIII   CN VIII normal.     CN IX, X   CN IX normal.   CN X normal.     CN XI   CN XI normal.     CN XII   CN XII normal.     Motor Exam   Muscle bulk: normal  Overall muscle tone: normal  Right arm pronator drift: absent  Left arm pronator drift: absent    Strength   Strength 5/5 throughout.     Sensory Exam   Light touch normal.   Pinprick normal.     Gait, Coordination, and Reflexes     Gait  Gait: normal    Coordination   Romberg: negative  Finger to nose coordination: normal  Tandem walking coordination: normal    Tremor   Resting tremor: absent  Intention tremor: absent  Action tremor: absent    Reflexes   Right biceps: 3+  Left biceps: 3+  Right triceps: 3+  Left triceps: 3+  Right patellar: 3+  Left patellar: 3+  Right achilles: 3+  Left achilles: 3+  Right Koenig: absent  Left Koenig: absent  Right ankle clonus: present  Left ankle clonus: present        Independent Review of Radiographic Studies:   MRI of the brain with and without contrast shows a right FLAIR changes in the frontal convexity with a fullness of the sulcal gyral pattern.  There is a deep mesial area that is distinct from the other changes.  These findings are new compared to July 2017.    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  He is also experiencing focal motor seizures because of this.  Were to stop his Decadron as this can interfere with her biopsy result.  I am sent a message to Galen Todd recommending an increase in his Keppra dosing.  We discussed a biopsy.  The risks and benefits were discussed at length which include but are not limited to infection, bleeding, paralysis, spinal fluid leak, bowel bladder incontinence, speech and memory problems, stroke, coma, and death.  He and his family acknowledged  understand this.  His questions and concerns were addressed.  We will get him preop and scheduled for a right stereotactic brain biopsy as soon as possible.  1. 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 Seizures (CMS/HCC)    3. Cigarette smoker    4. BMI 30.0-30.9,adult          Return for postoperatively.      Dillon Trevino MD

## 2019-04-08 NOTE — H&P
Patient: Lukasz Savage  : 1965    Primary Care Provider: Linda Hoffman, DNP, APRN    Requesting Provider: Royal Todd*        History    Chief Complaint: seizure   Chief Complaint   Patient presents with   • Brain Tumor     patient has imaging @ [facility] and is here to discuss results/recommendations       History of Present Illness: 54-year-old gentleman who presents from his neurology office after left facial and upper extremity focal motor seizure.  MRI was read as abnormal he is here to discuss the MRI results.  In 2017 he was hospitalized for PRES syndrome.  Presented as a seizure at that time and was intubated trached with an extended hospital stay and recovery.  He did very well however and fully recovered.  In 2019 he started to develop left facial twitching and left upper extremity twitching.  He has been started on Keppra 500 mg twice daily for just a few days.  He has not seen any improvement in the focal motor seizures.  He also relates some difficulty with speech and focus and concentration.  He is currently not driving he also is currently not working.  He denies any headaches no nausea no vomiting.    Review of Systems   Neurological: Positive for seizures.   All other systems reviewed and are negative.      Past Medical History:   Past Medical History:   Diagnosis Date   • Angio-edema 7/3/2017   • Anxiety    • Arthritis    • Clostridium difficile colitis    • Erectile dysfunction 10/6/2016   • Gout    • HCAP (healthcare-associated pneumonia) 2017   • Hyperlipidemia    • Malignant hypertension    • MSSA (methicillin susceptible Staphylococcus aureus) infection 2017   • Obstructive sleep apnea    • Seizures (CMS/HCC) 2017       Past Surgical History:   Past Surgical History:   Procedure Laterality Date   • TRACHEOSTOMY N/A 2017    Procedure: TRACHEOSTOMY WITH TRACHEOSCOPY;  Surgeon: Jairon Pierson MD;  Location: [facility] OR;  Service:         Family History: family history includes Diabetes in his maternal grandmother and mother; Heart attack in his paternal grandfather; Heart disease in his father; Hypertension in his father and mother; Kidney disease in his sister; No Known Problems in his daughter, maternal grandfather, paternal grandmother, and son.    Social History:  reports that he quit smoking about 21 months ago. His smoking use included cigarettes. He has a 9.90 pack-year smoking history. He has never used smokeless tobacco. He reports that he does not drink alcohol or use drugs.    Medications:    (Not in a hospital admission)    Allergies:  Lipitor [atorvastatin] and Lisinopril    Physical Exam:     Physical Exam   Constitutional: He is oriented to person, place, and time. He appears well-developed and well-nourished.   Eyes: EOM are normal. Pupils are equal, round, and reactive to light.   Cardiovascular: Normal rate and regular rhythm.   Pulmonary/Chest: Effort normal. No respiratory distress.   Abdominal: Soft. He exhibits no distension. There is no tenderness.   Neurological: He is oriented to person, place, and time. He has normal strength. He has a normal Finger-Nose-Finger Test, a normal Romberg Test and a normal Tandem Gait Test. Gait normal.   Reflex Scores:       Tricep reflexes are 3+ on the right side and 3+ on the left side.       Bicep reflexes are 3+ on the right side and 3+ on the left side.       Patellar reflexes are 3+ on the right side and 3+ on the left side.       Achilles reflexes are 3+ on the right side and 3+ on the left side.  Psychiatric: His speech is normal.       Neurologic Exam     Mental Status   Oriented to person, place, and time.   Attention: normal.   Speech: speech is normal   Level of consciousness: alert  Knowledge: good.     Cranial Nerves     CN II   Visual fields full to confrontation.     CN III, IV, VI   Pupils are equal, round, and reactive to light.  Extraocular motions are normal.     CN V    Facial sensation intact.     CN VII   Right facial weakness: none  Left facial weakness: Left central facial asymmetry on inspection but no true weakness per se.    CN VIII   CN VIII normal.     CN IX, X   CN IX normal.   CN X normal.     CN XI   CN XI normal.     CN XII   CN XII normal.     Motor Exam   Muscle bulk: normal  Overall muscle tone: normal  Right arm pronator drift: absent  Left arm pronator drift: absent    Strength   Strength 5/5 throughout.     Sensory Exam   Light touch normal.   Pinprick normal.     Gait, Coordination, and Reflexes     Gait  Gait: normal    Coordination   Romberg: negative  Finger to nose coordination: normal  Tandem walking coordination: normal    Tremor   Resting tremor: absent  Intention tremor: absent  Action tremor: absent    Reflexes   Right biceps: 3+  Left biceps: 3+  Right triceps: 3+  Left triceps: 3+  Right patellar: 3+  Left patellar: 3+  Right achilles: 3+  Left achilles: 3+  Right Koenig: absent  Left Koenig: absent  Right ankle clonus: present  Left ankle clonus: present        Independent Review of Radiographic Studies:   MRI of the brain with and without contrast shows a right FLAIR changes in the frontal convexity with a fullness of the sulcal gyral pattern.  There is a deep mesial area that is distinct from the other changes.  These findings are new compared to July 2017.    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  He is also experiencing focal motor seizures because of this.  Were to stop his Decadron as this can interfere with her biopsy result.  I am sent a message to Galen Todd recommending an increase in his Keppra dosing.  We discussed a biopsy.  The risks and benefits were discussed at length which include but are not limited to infection, bleeding, paralysis, spinal fluid leak, bowel bladder incontinence, speech and memory problems, stroke, coma, and death.  He and his family acknowledged  understand this.  His questions and concerns were addressed.  We will get him preop and scheduled for a right stereotactic brain biopsy as soon as possible.  1. 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 Seizures (CMS/HCC)    3. Cigarette smoker    4. BMI 30.0-30.9,adult          Return for postoperatively.      Dillon Trevino MD

## 2019-04-09 ENCOUNTER — APPOINTMENT (OUTPATIENT)
Dept: PREADMISSION TESTING | Facility: HOSPITAL | Age: 54
End: 2019-04-09

## 2019-04-09 VITALS
SYSTOLIC BLOOD PRESSURE: 112 MMHG | HEART RATE: 75 BPM | HEIGHT: 66 IN | OXYGEN SATURATION: 98 % | RESPIRATION RATE: 20 BRPM | DIASTOLIC BLOOD PRESSURE: 77 MMHG | BODY MASS INDEX: 31.68 KG/M2 | WEIGHT: 197.09 LBS

## 2019-04-09 DIAGNOSIS — R56.9 SEIZURES (HCC): ICD-10-CM

## 2019-04-09 DIAGNOSIS — D43.2 NEOPLASM OF UNCERTAIN BEHAVIOR OF BRAIN (HCC): ICD-10-CM

## 2019-04-09 LAB
ALBUMIN SERPL-MCNC: 3.9 G/DL (ref 3.5–5)
ALBUMIN/GLOB SERPL: 1.2 G/DL (ref 1.1–2.5)
ALP SERPL-CCNC: 51 U/L (ref 24–120)
ALT SERPL W P-5'-P-CCNC: 22 U/L (ref 0–54)
ANION GAP SERPL CALCULATED.3IONS-SCNC: 10 MMOL/L (ref 4–13)
AST SERPL-CCNC: 50 U/L (ref 7–45)
BILIRUB SERPL-MCNC: 0.6 MG/DL (ref 0.1–1)
BUN BLD-MCNC: 28 MG/DL (ref 5–21)
BUN/CREAT SERPL: 16 (ref 7–25)
CALCIUM SPEC-SCNC: 9.1 MG/DL (ref 8.4–10.4)
CHLORIDE SERPL-SCNC: 101 MMOL/L (ref 98–110)
CO2 SERPL-SCNC: 32 MMOL/L (ref 24–31)
CREAT BLD-MCNC: 1.75 MG/DL (ref 0.5–1.4)
DEPRECATED RDW RBC AUTO: 46.2 FL (ref 40–54)
ERYTHROCYTE [DISTWIDTH] IN BLOOD BY AUTOMATED COUNT: 14.7 % (ref 12–15)
GFR SERPL CREATININE-BSD FRML MDRD: 41 ML/MIN/1.73
GLOBULIN UR ELPH-MCNC: 3.2 GM/DL
GLUCOSE BLD-MCNC: 97 MG/DL (ref 70–100)
HCT VFR BLD AUTO: 38.4 % (ref 40–52)
HGB BLD-MCNC: 12.5 G/DL (ref 14–18)
MCH RBC QN AUTO: 28.2 PG (ref 28–32)
MCHC RBC AUTO-ENTMCNC: 32.6 G/DL (ref 33–36)
MCV RBC AUTO: 86.5 FL (ref 82–95)
PLATELET # BLD AUTO: 383 10*3/MM3 (ref 130–400)
PMV BLD AUTO: 9.8 FL (ref 6–12)
POTASSIUM BLD-SCNC: 3.5 MMOL/L (ref 3.5–5.3)
PROT SERPL-MCNC: 7.1 G/DL (ref 6.3–8.7)
RBC # BLD AUTO: 4.44 10*6/MM3 (ref 4.8–5.9)
SODIUM BLD-SCNC: 143 MMOL/L (ref 135–145)
WBC NRBC COR # BLD: 9.25 10*3/MM3 (ref 4.8–10.8)

## 2019-04-09 PROCEDURE — 36415 COLL VENOUS BLD VENIPUNCTURE: CPT

## 2019-04-09 PROCEDURE — 80053 COMPREHEN METABOLIC PANEL: CPT | Performed by: NEUROLOGICAL SURGERY

## 2019-04-09 PROCEDURE — 85027 COMPLETE CBC AUTOMATED: CPT | Performed by: NEUROLOGICAL SURGERY

## 2019-04-09 NOTE — DISCHARGE INSTRUCTIONS
DAY OF SURGERY INSTRUCTIONS        YOUR SURGEON: dr lemos    PROCEDURE: ***CRANIOTOMY WITH BIOPSY RIGHT    DATE OF SURGERY: ***4/15/2019    ARRIVAL TIME: AS DIRECTED BY OFFICE    YOU MAY TAKE THE FOLLOWING MEDICATION(S) THE MORNING OF SURGERY WITH A SIP OF WATER: ***coreg, keppra    ALL OTHER HOME MEDICATIONS CHECK WITH YOUR DOCTOR              MANAGING PAIN AFTER SURGERY    We know you are probably wondering what your pain will be like after surgery.  Following surgery it is unrealistic to expect you will not have pain.   Pain is how our bodies let us know that something is wrong or cautions us to be careful.  That said, our goal is to make your pain tolerable.    Methods we may use to treat your pain include (oral or IV medications, PCAs, epidurals, nerve blocks, etc.)   While some procedures require IV pain medications for a short time after surgery, transitioning to pain medications by mouth allows for better management of pain.   Your nurse will encourage you to take oral pain medications whenever possible.  IV medications work almost immediately, but only last a short while.  Taking medications by mouth allows for a more constant level of medication in your blood stream for a longer period of time.      Once your pain is out of control it is harder to get back under control.  It is important you are aware when your next dose of pain medication is due.  If you are admitted, your nurse may write the time of your next dose on the white board in your room to help you remember.      We are interested in your pain and encourage you to inform us about aggravating factors during your visit.   Many times a simple repositioning every few hours can make a big difference.    If your physician says it is okay, do not let your pain prevent you from getting out of bed. Be sure to call your nurse for assistance prior to getting up so you do not fall.      Before surgery, please decide your tolerable pain goal.  These faces  help describe the pain ratings we use on a 0-10 scale.   Be prepared to tell us your goal and whether or not you take pain or anxiety medications at home.            BEFORE YOU COME TO THE HOSPITAL  (Pre-op instructions)  • Do not eat, drink, smoke or chew gum after midnight the night before surgery.  This also includes no mints.  • Morning of surgery take only the medicines you have been instructed with a sip of water unless otherwise instructed  by your physician.  • Do not shave, wear makeup or dark nail polish.  • Remove all jewelry including rings.  • Leave anything you consider valuable at home.  • Leave your suitcase in the car until after your surgery.  • Bring the following with you if applicable:  o Picture ID and insurance, Medicare or Medicaid cards  o Co-pay/deductible required by insurance (cash, check, credit card)  o Copy of advance directive, living will or power-of- documents if not brought to PAT  o CPAP or BIPAP mask and tubing  o Relaxation aids (MP3 player, book, magazine)  • On the day of surgery check in at registration located at the main entrance of the hospital.       Outpatient Surgery Guidelines, Adult  Outpatient procedures are those for which the person having the procedure is allowed to go home the same day as the procedure. Various procedures are done on an outpatient basis. You should follow some general guidelines if you will be having an outpatient procedure.  LET YOUR HEALTH CARE PROVIDER KNOW ABOUT:  · Any allergies you have.  · All medicines you are taking, including vitamins, herbs, eye drops, creams, and over-the-counter medicines.  · Previous problems you or members of your family have had with the use of anesthetics.  · Any blood disorders you have.  · Previous surgeries you have had.  · Medical conditions you have.  RISKS AND COMPLICATIONS  Your health care provider will discuss possible risks and complications with you before surgery. Common risks and  complications include:    · Problems due to the use of anesthetics.  · Blood loss and replacement (does not apply to minor surgical procedures).  · Temporary increase in pain due to surgery.  · Uncorrected pain or problems that the surgery was meant to correct.  · Infection.  · New damage.  BEFORE THE PROCEDURE  · Ask your health care provider about changing or stopping your regular medicines. You may need to stop taking certain medicines in the days or weeks before the procedure.  · Stop smoking at least 2 weeks before surgery. This lowers your risk for complications during and after surgery. Ask your health care provider for help with this if needed.  · Eat your usual meals and a light supper the day before surgery. Continue fluid intake. Do not drink alcohol.  · Do not eat or drink after midnight the night before your surgery.   · Arrange for someone to take you home and to stay with you for 24 hours after the procedure. Medicine given for your procedure may affect your ability to drive or to care for yourself.  · Call your health care provider's office if you develop an illness or problem that may prevent you from safely having your procedure.  AFTER THE PROCEDURE  After surgery, you will be taken to a recovery area, where your progress will be monitored. If there are no complications, you will be allowed to go home when you are awake, stable, and taking fluids well. You may have numbness around the surgical site. Healing will take some time. You will have tenderness at the surgical site and may have some swelling and bruising. You may also have some nausea.  HOME CARE INSTRUCTIONS  · Do not drive for 24 hours, or as directed by your health care provider. Do not drive while taking prescription pain medicines.  · Do not drink alcohol for 24 hours.  · Do not make important decisions or sign legal documents for 24 hours.  · You may resume a normal diet and activities as directed.  · Do not lift anything heavier  than 10 pounds (4.5 kg) or play contact sports until your health care provider says it is okay.  · Change your bandages (dressings) as directed.  · Only take over-the-counter or prescription medicines as directed by your health care provider.  · Follow up with your health care provider as directed.  SEEK MEDICAL CARE IF:  · You have increased bleeding (more than a small spot) from the surgical site.  · You have redness, swelling, or increasing pain in the wound.  · You see pus coming from the wound.  · You have a fever.  · You notice a bad smell coming from the wound or dressing.  · You feel lightheaded or faint.  · You develop a rash.  · You have trouble breathing.  · You develop allergies.  MAKE SURE YOU:  · Understand these instructions.  · Will watch your condition.  · Will get help right away if you are not doing well or get worse.     This information is not intended to replace advice given to you by your health care provider. Make sure you discuss any questions you have with your health care provider.     Document Released: 09/12/2002 Document Revised: 05/03/2016 Document Reviewed: 05/22/2014  [employer] Interactive Patient Education ©2016 [employer]       Fall Prevention in Hospitals, Adult  As a hospital patient, your condition and the treatments you receive can increase your risk for falls. Some additional risk factors for falls in a hospital include:  · Being in an unfamiliar environment.  · Being on bed rest.  · Your surgery.  · Taking certain medicines.  · Your tubing requirements, such as intravenous (IV) therapy or catheters.  It is important that you learn how to decrease fall risks while at the hospital. Below are important tips that can help prevent falls.  SAFETY TIPS FOR PREVENTING FALLS  Talk about your risk of falling.  · Ask your health care provider why you are at risk for falling. Is it your medicine, illness, tubing placement, or something else?  · Make a plan with your health care  provider to keep you safe from falls.  · Ask your health care provider or pharmacist about side effects of your medicines. Some medicines can make you dizzy or affect your coordination.  Ask for help.  · Ask for help before getting out of bed. You may need to press your call button.  · Ask for assistance in getting safely to the toilet.  · Ask for a walker or cane to be put at your bedside. Ask that most of the side rails on your bed be placed up before your health care provider leaves the room.  · Ask family or friends to sit with you.  · Ask for things that are out of your reach, such as your glasses, hearing aids, telephone, bedside table, or call button.  Follow these tips to avoid falling:  · Stay lying or seated, rather than standing, while waiting for help.  · Wear rubber-soled slippers or shoes whenever you walk in the hospital.  · Avoid quick, sudden movements.  ¨ Change positions slowly.  ¨ Sit on the side of your bed before standing.  ¨ Stand up slowly and wait before you start to walk.  · Let your health care provider know if there is a spill on the floor.  · Pay careful attention to the medical equipment, electrical cords, and tubes around you.  · When you need help, use your call button by your bed or in the bathroom. Wait for one of your health care providers to help you.  · If you feel dizzy or unsure of your footing, return to bed and wait for assistance.  · Avoid being distracted by the TV, telephone, or another person in your room.  · Do not lean or support yourself on rolling objects, such as IV poles or bedside tables.     This information is not intended to replace advice given to you by your health care provider. Make sure you discuss any questions you have with your health care provider.     Document Released: 12/15/2001 Document Revised: 01/08/2016 Document Reviewed: 08/25/2013  [employer] Interactive Patient Education ©2016 [employer]       Surgical Site Infections FAQs  What is a Surgical  Site Infection (SSI)?  A surgical site infection is an infection that occurs after surgery in the part of the body where the surgery took place. Most patients who have surgery do not develop an infection. However, infections develop in about 1 to 3 out of every 100 patients who have surgery.  Some of the common symptoms of a surgical site infection are:  · Redness and pain around the area where you had surgery  · Drainage of cloudy fluid from your surgical wound  · Fever  Can SSIs be treated?  Yes. Most surgical site infections can be treated with antibiotics. The antibiotic given to you depends on the bacteria (germs) causing the infection. Sometimes patients with SSIs also need another surgery to treat the infection.  What are some of the things that hospitals are doing to prevent SSIs?  To prevent SSIs, doctors, nurses, and other healthcare providers:  · Clean their hands and arms up to their elbows with an antiseptic agent just before the surgery.  · Clean their hands with soap and water or an alcohol-based hand rub before and after caring for each patient.  · May remove some of your hair immediately before your surgery using electric clippers if the hair is in the same area where the procedure will occur. They should not shave you with a razor.  · Wear special hair covers, masks, gowns, and gloves during surgery to keep the surgery area clean.  · Give you antibiotics before your surgery starts. In most cases, you should get antibiotics within 60 minutes before the surgery starts and the antibiotics should be stopped within 24 hours after surgery.  · Clean the skin at the site of your surgery with a special soap that kills germs.  What can I do to help prevent SSIs?  Before your surgery:  · Tell your doctor about other medical problems you may have. Health problems such as allergies, diabetes, and obesity could affect your surgery and your treatment.  · Quit smoking. Patients who smoke get more infections. Talk  to your doctor about how you can quit before your surgery.  · Do not shave near where you will have surgery. Shaving with a razor can irritate your skin and make it easier to develop an infection.  At the time of your surgery:  · Speak up if someone tries to shave you with a razor before surgery. Ask why you need to be shaved and talk with your surgeon if you have any concerns.  · Ask if you will get antibiotics before surgery.  After your surgery:  · Make sure that your healthcare providers clean their hands before examining you, either with soap and water or an alcohol-based hand rub.  · If you do not see your providers clean their hands, please ask them to do so.  · Family and friends who visit you should not touch the surgical wound or dressings.  · Family and friends should clean their hands with soap and water or an alcohol-based hand rub before and after visiting you. If you do not see them clean their hands, ask them to clean their hands.  What do I need to do when I go home from the hospital?  · Before you go home, your doctor or nurse should explain everything you need to know about taking care of your wound. Make sure you understand how to care for your wound before you leave the hospital.  · Always clean your hands before and after caring for your wound.  · Before you go home, make sure you know who to contact if you have questions or problems after you get home.  · If you have any symptoms of an infection, such as redness and pain at the surgery site, drainage, or fever, call your doctor immediately.  If you have additional questions, please ask your doctor or nurse.  Developed and co-sponsored by The Society for Healthcare Epidemiology of [place] (SHEA); Infectious Diseases Society of [place] (IDSA); American Hospital Association; Association for Professionals in Infection Control and Epidemiology (APIC); Centers for Disease Control and Prevention (CDC); and The Joint Commission.     This information  is not intended to replace advice given to you by your health care provider. Make sure you discuss any questions you have with your health care provider.     Document Released: 12/23/2014 Document Revised: 01/08/2016 Document Reviewed: 03/02/2016  [employer] Interactive Patient Education ©2016 [employer]     PATIENT/FAMILY/RESPONSIBLE PARTY VERBALIZES UNDERSTANDING OF ABOVE EDUCATION.  COPY OF PAIN SCALE GIVEN AND REVIEWED WITH VERBALIZED UNDERSTANDING.

## 2019-04-10 ENCOUNTER — OFFICE VISIT (OUTPATIENT)
Dept: FAMILY MEDICINE CLINIC | Facility: CLINIC | Age: 54
End: 2019-04-10

## 2019-04-10 VITALS
HEART RATE: 77 BPM | RESPIRATION RATE: 18 BRPM | BODY MASS INDEX: 31.69 KG/M2 | WEIGHT: 197.2 LBS | SYSTOLIC BLOOD PRESSURE: 115 MMHG | OXYGEN SATURATION: 99 % | DIASTOLIC BLOOD PRESSURE: 74 MMHG | TEMPERATURE: 97.8 F | HEIGHT: 66 IN

## 2019-04-10 DIAGNOSIS — Z01.818 ENCOUNTER FOR PRE-OPERATIVE EXAMINATION: Primary | ICD-10-CM

## 2019-04-10 DIAGNOSIS — D49.6 BRAIN NEOPLASM (HCC): ICD-10-CM

## 2019-04-10 PROCEDURE — 99214 OFFICE O/P EST MOD 30 MIN: CPT | Performed by: NURSE PRACTITIONER

## 2019-04-10 RX ORDER — LEVETIRACETAM 1000 MG/1
1000 TABLET ORAL 2 TIMES DAILY
Qty: 60 TABLET | Refills: 1 | Status: SHIPPED | OUTPATIENT
Start: 2019-04-10 | End: 2019-04-16 | Stop reason: HOSPADM

## 2019-04-10 NOTE — PROGRESS NOTES
Chief Complaint   Patient presents with   • Brain tumor     Pt is here for surgery clearance for brain tumor.           Subjective   Lukasz Savage is a 54 y.o. male is here for surgical clearance for brain biopsy tomorrow. Initially, he was complaining of migraines and weakness and I made the referral to [facility].   He complained of increased difficulty with weakness in left hand, generalized seizure like activity, and transient symptoms (involuntary twitching) in Left upper extremity, face and left lower extremity.  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 He was referred further to Dr. Trevino for further workup and evaluation and is scheduled for a brain biopsy tomorrow.  Denies any seizure activity in the last 3 day, denies any drug or ETOH use/abuse since 7/2017.  He is joking in the office and says that the nurses at neurology have called and left several messages but he hasn't had time to call them back.  I called [facility] office and asked to speak to him.  I explained pt was in my office and commented that his office had called multiple times.  [facility] stated that they were trying to call him to increase his Keppra to 1000mg twice daily and that they sent in B6 100 mg daily.  I relayed this message to patient and mother.        Labs were reviewed as below.  Kidney function mildly elevated, however he won't be getting any dye, so this will be fine. I also double checked with Jose Frausto.    Contains abnormal data CBC (No Diff)   Order: [id number]   Status:  Final result   Visible to patient:  No (Not Released)    Ref Range & Units 1d ago 6d ago 4mo ago 8mo ago   WBC 4.80 - 10.80 10*3/mm3 9.25  10.77 R 6.49  7.31    RBC 4.80 - 5.90 10*6/mm3 4.44 Abnormally low   4.61 R 4.34 Abnormally low   4.32 Abnormally low     Hemoglobin 14.0 - 18.0 g/dL 12.5 Abnormally low   13.4 R  13.1 Abnormally low   12.7 Abnormally low     Hematocrit 40.0 - 52.0 % 38.4 Abnormally low   42.5 R 39.8 Abnormally low   38.8 Abnormally low     MCV 82.0 - 95.0 fL 86.5  92.2 R 91.7  89.8    MCH 28.0 - 32.0 pg 28.2  29.1 R 30.2  29.4    MCHC 33.0 - 36.0 g/dL 32.6 Abnormally low   31.5 R 32.9 Abnormally low   32.7 Abnormally low     RDW 12.0 - 15.0 % 14.7  15.3 R 14.4  14.3    RDW-SD 40.0 - 54.0 fl 46.2    46.9    MPV 6.0 - 12.0 fL 9.8    9.9    Platelets 130 - 400 10*3/mm3 383  421 R 318  251    Resulting Agency   [facility]         Specimen Collected: 04/09/19 14:05 Last Resulted: 04/09/19 14:39           Contains abnormal data Comprehensive metabolic panel   Order: [id number]   Status:  Final result   Visible to patient:  No (Not Released) Dx:  Seizures (CMS/HCC); Neoplasm of uncer...    Ref Range & Units 1d ago 8d ago 8mo ago 10mo ago   Glucose 70 - 100 mg/dL 97   110 Abnormally high   110 Abnormally high     BUN 5 - 21 mg/dL 28 Abnormally high    29 Abnormally high   28 Abnormally high     Creatinine 0.50 - 1.40 mg/dL 1.75 Abnormally high   1.50 Abnormally high  R, CM 2.33 Abnormally high   1.90 Abnormally high     Sodium 135 - 145 mmol/L 143   144  144    Potassium 3.5 - 5.3 mmol/L 3.5   4.6  4.6    Chloride 98 - 110 mmol/L 101   105  105    CO2 24.0 - 31.0 mmol/L 32.0 Abnormally high    26.0     Calcium 8.4 - 10.4 mg/dL 9.1   9.5  9.9    Total Protein 6.3 - 8.7 g/dL 7.1   7.2  7.2    Albumin 3.50 - 5.00 g/dL 3.90   4.40  4.20    ALT (SGPT) 0 - 54 U/L 22   33  30    AST (SGOT) 7 - 45 U/L 50 Abnormally high    35  41    Alkaline Phosphatase 24 - 120 U/L 51   71  56    Total Bilirubin 0.1 - 1.0 mg/dL 0.6   0.2  0.4    eGFR Non African Amer >60 mL/min/1.73 41 Abnormally low    30 Abnormally low   37 Abnormally low     Globulin gm/dL 3.2   2.8     A/G Ratio 1.1 - 2.5 g/dL 1.2   1.6  1.4    BUN/Creatinine Ratio 7.0 - 25.0 16.0   12.4  14.7    Anion Gap 4.0 - 13.0 mmol/L 10.0   13.0      Resulting Agency   [facility]               The following portions of the patient's history were reviewed and updated as appropriate: allergies, current medications, past family history, past medical history, past social history, past surgical history and problem list.      Current Outpatient Medications:   •  allopurinol (ZYLOPRIM) 100 MG tablet, Take 100 mg by mouth Daily., Disp: , Rfl:   •  aspirin 81 MG tablet, Take 1 tablet by mouth Daily., Disp: , Rfl:   •  carvedilol (COREG) 25 MG tablet, Take 1 tablet by mouth 2 (Two) Times a Day With Meals., Disp: 180 tablet, Rfl: 1  •  CloNIDine (CATAPRES) 0.1 MG tablet, Take 1 tablet by mouth Every Night. Check BP 4x daily. If> 180 SBP take x1., Disp: 60 tablet, Rfl: 2  •  colchicine 0.6 MG tablet, Take 2 tabs now, repeat 1 tab in an hour.  May repeat same steps again in 24 hours if needed.  He has chronic gout, Disp: 30 tablet, Rfl: 2  •  fenofibrate (TRICOR) 145 MG tablet, Take 1 tablet by mouth Daily., Disp: 90 tablet, Rfl: 1  •  furosemide (LASIX) 20 MG tablet, TAKE ONE TABLET BY MOUTH DAILY (Patient taking differently: prn), Disp: 30 tablet, Rfl: 0  •  levETIRAcetam (KEPPRA) 1000 MG tablet, Take 1 tablet by mouth 2 (Two) Times a Day., Disp: 60 tablet, Rfl: 1  •  losartan (COZAAR) 100 MG tablet, Take 1 tablet by mouth Daily. For high blood pressure, Disp: 90 tablet, Rfl: 1  •  Omega-3 Fatty Acids (FISH OIL) 1000 MG capsule capsule, Take 2 capsules by mouth Daily With Breakfast., Disp: 180 capsule, Rfl: 1  •  omeprazole (PRILOSEC) 10 MG capsule, Take 1 capsule by mouth Daily. For acid reflux, Disp: 90 capsule, Rfl: 1  •  QUEtiapine (SEROquel) 100 MG tablet, Take 1 tablet by mouth every night at bedtime., Disp: 90 tablet, Rfl: 0  •  spironolactone (ALDACTONE) 25 MG tablet, Take 1 tablet by mouth Daily., Disp: 90 tablet, Rfl: 1  •  vardenafil (LEVITRA) 10 MG tablet, Take 1 tablet by mouth Daily As Needed for erectile dysfunction., Disp: 10 tablet, Rfl: 5  •   venlafaxine XR (EFFEXOR-XR) 150 MG 24 hr capsule, Take 1 capsule by mouth Daily., Disp: 90 capsule, Rfl: 1  •  vitamin B-6 (PYRIDOXINE) 100 MG tablet, Take 1 tablet by mouth Daily., Disp: 30 tablet, Rfl: 3  Allergies   Allergen Reactions   • Lipitor [Atorvastatin] Rash   • Lisinopril Angioedema     Swell tongue     Past Medical History:   Diagnosis Date   • Angio-edema 7/3/2017   • Anxiety    • Arthritis    • Clostridium difficile colitis    • Erectile dysfunction 10/6/2016   • GERD (gastroesophageal reflux disease)    • Gout    • HCAP (healthcare-associated pneumonia) 7/11/2017   • Hyperlipidemia    • Malignant hypertension    • MSSA (methicillin susceptible Staphylococcus aureus) infection 7/31/2017   • Obstructive sleep apnea    • Seizures (CMS/HCC) 7/4/2017     Past Surgical History:   Procedure Laterality Date   • COLONOSCOPY     • SHOULDER ARTHROSCOPY Left    • TRACHEOSTOMY N/A 7/12/2017    Procedure: TRACHEOSTOMY WITH TRACHEOSCOPY;  Surgeon: Jairon Pierson MD;  Location: [facility];  Service:        Family History   Problem Relation Age of Onset   • Hypertension Mother    • Diabetes Mother    • Heart disease Father    • Hypertension Father    • No Known Problems Daughter    • No Known Problems Son    • Diabetes Maternal Grandmother    • No Known Problems Maternal Grandfather    • No Known Problems Paternal Grandmother    • Heart attack Paternal Grandfather    • Kidney disease Sister        REVIEW OF SYMPTOMS: (Positives bolded)  General:  weight loss, fever, chills, night sweats, fatigue, appetite loss  HEENT:  blurry vision, eye pain, eye discharge, dry eyes, decreased vision  Respiratory: shortness of breath, cough, hemoptysis, wheezing, pleurisy,   Cardiovascular:  chest pain, PND, palpitation, edema, orthopnea, syncope  Gastro: Nausea, vomiting, diarrhea, hematemesis, abdominal pain, constipation  Genito: hematuria, dysuria, glycosuria, hesitancy, frequency, incontinence  Musckelo: Arthralgia, myalgia,  "muscle weakness, joint swelling, NSAID use  Skin: rash, pruritis, sores, nail changes, skin thickening, change in wart/mole, itching   Neuro:  Migraine, numbness, ataxia, tremor, vertigo, weakness, memory loss  \"All other systems reviewed and negative, except as listed above.”      OBJECTIVE:  Constitutional:  No acute distress, Consistent with stated age. Oriented x 3, alert Posture. Patient is pleasant and cooperative with the interview and exam.    Integumentary: No rashes, ulcers or lesions. No edema.  Normal skin moisture/turgor. Skin is warm to touch, no increased warmth. Capillary refill is normal bilateral Upper and lower extremity.     Eye: Bilaterally PERRLA, EOMI.  No discharge.  Upper and lower eyelids are normal. Sclera/conjunctiva normal without discharge. Cornea is normal and clear. Lens is normal.  Eyeball appears normal. No ciliary flushing, no conjunctival injection.    ENMT:  Canals  normal without erythema or discharge, no excessive cerumen. Tympanic membranes Grey/pearly, normal light reflex and anatomy. Hearing normal to conversational speech at 2-5 feet. Nares airflow, no discharge. Dentition assessed and discussed appropriate oral care. Tongue normal midline.  no pharyngeal erythema, Uvula midline. No post nasal drip.     CHEST/LUNG:Lungs clear throughout lung fields without rale, rhonchi or wheezes.  Normal effort, no distress, no use of accessory muscles. nontender sternum, ribline.  No abnormal pulsations.      CARDIOVASCULAR:  Regular rate and rhythm. No murmur noted in sitting, supine positions. digital clubbing, cyanosis, edema, increased warmth.  normal, no bruits or abnormal pulsations.      ABDOMEN: normal and no visible pulsations. Normal contour. Bowel sounds normal, no abdominal bruits. Abd soft, non-tender, no rebound tenderness, no rigidity (guarding), no jar tenderness, no masses.  no hepatomegaly, no splenomegaly     Neuropsych: Able to articulate well. Normal speech, normal " "rate, normal tone, normal use of language, volume and coherence.  Thought- normal with ability to perform basic computations and apply abstract thought/reason. No hallucinations, delusions, obsessions.   Appropriate judgement and memory.    Neuro: CN2-12 intact, Muscle bulk: normal, Strength 5/5 bilaterally, Gait normal, Speech normal.        /74 (BP Location: Left arm, Patient Position: Sitting, Cuff Size: Adult)   Pulse 77   Temp 97.8 °F (36.6 °C) (Oral)   Resp 18   Ht 168 cm (66.14\")   Wt 89.4 kg (197 lb 3.2 oz)   SpO2 99%   BMI 31.69 kg/m²     ASSESSMENT  Lukasz was seen today for brain tumor.    Diagnoses and all orders for this visit:    Encounter for pre-operative examination      -     Pt is cleared for surgery      -     Low surgical risk          Brain neoplasm (CMS/HCC)  -     levETIRAcetam (KEPPRA) 1000 MG tablet; Take 1 tablet by mouth 2 (Two) Times a Day.        -     Educated to take the B6 100 mg daily to help decrease irritability from Keppra.        -     Educated that increasing the keppra may increase drowsiness     Return in about 1 week (around 4/17/2019) for Recheck brain biopsy.       Findings, expectations, follow-up study recommendations, followups,  medication recommendations and potential side effects, seizure precautions including avoidance of driving, operation of heavy machinery, operation of off-road vehicles, working at heights, working around water, etc. patient questions, family questions and follow-up appointment date and time are discussed in detail with the patient and his family.  The patient's primary support person is his mother.    Linda Hoffman, DNP, APRN-BC   04/10/2019    "

## 2019-04-11 LAB
BILIRUB UR QL STRIP: NEGATIVE
CLARITY UR: CLEAR
COLOR UR: YELLOW
GLUCOSE UR STRIP-MCNC: NEGATIVE MG/DL
HGB UR QL STRIP.AUTO: NEGATIVE
KETONES UR QL STRIP: NEGATIVE
LEUKOCYTE ESTERASE UR QL STRIP.AUTO: NEGATIVE
NITRITE UR QL STRIP: NEGATIVE
PH UR STRIP.AUTO: 6.5 [PH] (ref 5–8)
PROT UR QL STRIP: NEGATIVE
SP GR UR STRIP: 1.02 (ref 1–1.03)
UROBILINOGEN UR QL STRIP: NORMAL

## 2019-04-11 PROCEDURE — 81003 URINALYSIS AUTO W/O SCOPE: CPT | Performed by: NEUROLOGICAL SURGERY

## 2019-04-15 ENCOUNTER — APPOINTMENT (OUTPATIENT)
Dept: CT IMAGING | Facility: HOSPITAL | Age: 54
End: 2019-04-15

## 2019-04-15 ENCOUNTER — HOSPITAL ENCOUNTER (INPATIENT)
Facility: HOSPITAL | Age: 54
LOS: 1 days | Discharge: HOME | End: 2019-04-16
Attending: NEUROLOGICAL SURGERY | Admitting: NEUROLOGICAL SURGERY

## 2019-04-15 ENCOUNTER — ANESTHESIA EVENT (OUTPATIENT)
Dept: PERIOP | Facility: HOSPITAL | Age: 54
End: 2019-04-15

## 2019-04-15 ENCOUNTER — ANESTHESIA (OUTPATIENT)
Dept: PERIOP | Facility: HOSPITAL | Age: 54
End: 2019-04-15

## 2019-04-15 DIAGNOSIS — Z91.81 RISK FOR FALLS: Primary | ICD-10-CM

## 2019-04-15 DIAGNOSIS — D43.2 NEOPLASM OF UNCERTAIN BEHAVIOR OF BRAIN (HCC): ICD-10-CM

## 2019-04-15 DIAGNOSIS — R56.9 SEIZURES (HCC): ICD-10-CM

## 2019-04-15 PROBLEM — D49.6 NEOPLASM, BRAIN (HCC): Status: ACTIVE | Noted: 2019-04-15

## 2019-04-15 LAB
ABO GROUP BLD: NORMAL
BLD GP AB SCN SERPL QL: NEGATIVE
RH BLD: NEGATIVE
T&S EXPIRATION DATE: NORMAL

## 2019-04-15 PROCEDURE — 81121 IDH2 COMMON VARIANTS: CPT

## 2019-04-15 PROCEDURE — 25010000002 ONDANSETRON PER 1 MG: Performed by: NURSE ANESTHETIST, CERTIFIED REGISTERED

## 2019-04-15 PROCEDURE — 70450 CT HEAD/BRAIN W/O DYE: CPT

## 2019-04-15 PROCEDURE — 88341 IMHCHEM/IMCYTCHM EA ADD ANTB: CPT

## 2019-04-15 PROCEDURE — 25010000002 FENTANYL CITRATE (PF) 250 MCG/5ML SOLUTION: Performed by: NURSE ANESTHETIST, CERTIFIED REGISTERED

## 2019-04-15 PROCEDURE — 25010000002 DEXAMETHASONE PER 1 MG: Performed by: NURSE ANESTHETIST, CERTIFIED REGISTERED

## 2019-04-15 PROCEDURE — 94799 UNLISTED PULMONARY SVC/PX: CPT

## 2019-04-15 PROCEDURE — 25010000002 MORPHINE PER 10 MG

## 2019-04-15 PROCEDURE — 86850 RBC ANTIBODY SCREEN: CPT | Performed by: NEUROLOGICAL SURGERY

## 2019-04-15 PROCEDURE — 81120 IDH1 COMMON VARIANTS: CPT

## 2019-04-15 PROCEDURE — 25010000002 PROPOFOL 10 MG/ML EMULSION: Performed by: NURSE ANESTHETIST, CERTIFIED REGISTERED

## 2019-04-15 PROCEDURE — 61750 INCISE SKULL/BRAIN BIOPSY: CPT | Performed by: NEUROLOGICAL SURGERY

## 2019-04-15 PROCEDURE — 88342 IMHCHEM/IMCYTCHM 1ST ANTB: CPT

## 2019-04-15 PROCEDURE — 03HY32Z INSERTION OF MONITORING DEVICE INTO UPPER ARTERY, PERCUTANEOUS APPROACH: ICD-10-PCS | Performed by: ANESTHESIOLOGY

## 2019-04-15 PROCEDURE — 25010000002 SUCCINYLCHOLINE PER 20 MG: Performed by: NURSE ANESTHETIST, CERTIFIED REGISTERED

## 2019-04-15 PROCEDURE — 25010000002 FENTANYL CITRATE (PF) 100 MCG/2ML SOLUTION: Performed by: NURSE ANESTHETIST, CERTIFIED REGISTERED

## 2019-04-15 PROCEDURE — 25010000002 MORPHINE PER 10 MG: Performed by: NEUROLOGICAL SURGERY

## 2019-04-15 PROCEDURE — 86901 BLOOD TYPING SEROLOGIC RH(D): CPT | Performed by: NEUROLOGICAL SURGERY

## 2019-04-15 PROCEDURE — 86900 BLOOD TYPING SEROLOGIC ABO: CPT | Performed by: NEUROLOGICAL SURGERY

## 2019-04-15 PROCEDURE — 25010000002 MORPHINE PER 10 MG: Performed by: NURSE PRACTITIONER

## 2019-04-15 PROCEDURE — 81287 MGMT GENE PRMTR MTHYLTN ALYS: CPT

## 2019-04-15 PROCEDURE — C1713 ANCHOR/SCREW BN/BN,TIS/BN: HCPCS | Performed by: NEUROLOGICAL SURGERY

## 2019-04-15 PROCEDURE — 88381 MICRODISSECTION MANUAL: CPT

## 2019-04-15 PROCEDURE — 00B03ZX EXCISION OF BRAIN, PERCUTANEOUS APPROACH, DIAGNOSTIC: ICD-10-PCS | Performed by: NEUROLOGICAL SURGERY

## 2019-04-15 PROCEDURE — 81345 TERT GENE TARGETED SEQ ALYS: CPT

## 2019-04-15 PROCEDURE — 88321 CONSLTJ&REPRT SLD PREP ELSWR: CPT | Performed by: NEUROLOGICAL SURGERY

## 2019-04-15 PROCEDURE — 88331 PATH CONSLTJ SURG 1 BLK 1SPC: CPT | Performed by: PATHOLOGY

## 2019-04-15 PROCEDURE — 88307 TISSUE EXAM BY PATHOLOGIST: CPT | Performed by: NEUROLOGICAL SURGERY

## 2019-04-15 PROCEDURE — 25010000002 DEXAMETHASONE PER 1 MG: Performed by: NURSE PRACTITIONER

## 2019-04-15 PROCEDURE — 25010000002 MIDAZOLAM PER 1 MG: Performed by: NURSE ANESTHETIST, CERTIFIED REGISTERED

## 2019-04-15 PROCEDURE — 25010000002 CEFAZOLIN PER 500 MG: Performed by: NURSE PRACTITIONER

## 2019-04-15 DEVICE — HEMO ABS GELFOAM SPNG PORCN SZ100: Type: IMPLANTABLE DEVICE | Status: FUNCTIONAL

## 2019-04-15 DEVICE — IMPLANTABLE DEVICE
Type: IMPLANTABLE DEVICE | Status: FUNCTIONAL
Brand: THINFLAP SYSTEM

## 2019-04-15 DEVICE — HEMO ABS GELFOAM PWDR PORCN 1GM: Type: IMPLANTABLE DEVICE | Status: FUNCTIONAL

## 2019-04-15 RX ORDER — MIDAZOLAM HYDROCHLORIDE 1 MG/ML
2 INJECTION INTRAMUSCULAR; INTRAVENOUS
Status: DISCONTINUED | OUTPATIENT
Start: 2019-04-15 | End: 2019-04-15 | Stop reason: HOSPADM

## 2019-04-15 RX ORDER — MORPHINE SULFATE 2 MG/ML
2 INJECTION, SOLUTION INTRAMUSCULAR; INTRAVENOUS
Status: DISCONTINUED | OUTPATIENT
Start: 2019-04-15 | End: 2019-04-16 | Stop reason: HOSPADM

## 2019-04-15 RX ORDER — ROCURONIUM BROMIDE 10 MG/ML
INJECTION, SOLUTION INTRAVENOUS AS NEEDED
Status: DISCONTINUED | OUTPATIENT
Start: 2019-04-15 | End: 2019-04-15 | Stop reason: SURG

## 2019-04-15 RX ORDER — NALOXONE HCL 0.4 MG/ML
0.4 VIAL (ML) INJECTION
Status: DISCONTINUED | OUTPATIENT
Start: 2019-04-15 | End: 2019-04-16 | Stop reason: HOSPADM

## 2019-04-15 RX ORDER — SODIUM CHLORIDE 0.9 % (FLUSH) 0.9 %
3 SYRINGE (ML) INJECTION EVERY 12 HOURS SCHEDULED
Status: DISCONTINUED | OUTPATIENT
Start: 2019-04-15 | End: 2019-04-15 | Stop reason: HOSPADM

## 2019-04-15 RX ORDER — NALOXONE HCL 0.4 MG/ML
0.4 VIAL (ML) INJECTION
Status: DISCONTINUED | OUTPATIENT
Start: 2019-04-15 | End: 2019-04-15

## 2019-04-15 RX ORDER — FLUMAZENIL 0.1 MG/ML
0.2 INJECTION INTRAVENOUS AS NEEDED
Status: DISCONTINUED | OUTPATIENT
Start: 2019-04-15 | End: 2019-04-15 | Stop reason: HOSPADM

## 2019-04-15 RX ORDER — SODIUM CHLORIDE 0.9 % (FLUSH) 0.9 %
1-10 SYRINGE (ML) INJECTION AS NEEDED
Status: DISCONTINUED | OUTPATIENT
Start: 2019-04-15 | End: 2019-04-15 | Stop reason: HOSPADM

## 2019-04-15 RX ORDER — BUPIVACAINE HCL/0.9 % NACL/PF 0.1 %
2 PLASTIC BAG, INJECTION (ML) EPIDURAL EVERY 8 HOURS
Status: COMPLETED | OUTPATIENT
Start: 2019-04-15 | End: 2019-04-16

## 2019-04-15 RX ORDER — LABETALOL HYDROCHLORIDE 5 MG/ML
10 INJECTION, SOLUTION INTRAVENOUS
Status: DISCONTINUED | OUTPATIENT
Start: 2019-04-15 | End: 2019-04-16 | Stop reason: HOSPADM

## 2019-04-15 RX ORDER — LIDOCAINE HYDROCHLORIDE 40 MG/ML
SOLUTION TOPICAL AS NEEDED
Status: DISCONTINUED | OUTPATIENT
Start: 2019-04-15 | End: 2019-04-15 | Stop reason: SURG

## 2019-04-15 RX ORDER — QUETIAPINE FUMARATE 100 MG/1
100 TABLET, FILM COATED ORAL NIGHTLY
Status: DISCONTINUED | OUTPATIENT
Start: 2019-04-15 | End: 2019-04-16 | Stop reason: HOSPADM

## 2019-04-15 RX ORDER — CLONIDINE HYDROCHLORIDE 0.1 MG/1
0.1 TABLET ORAL NIGHTLY
Status: DISCONTINUED | OUTPATIENT
Start: 2019-04-15 | End: 2019-04-16 | Stop reason: HOSPADM

## 2019-04-15 RX ORDER — ONDANSETRON 2 MG/ML
4 INJECTION INTRAMUSCULAR; INTRAVENOUS AS NEEDED
Status: DISCONTINUED | OUTPATIENT
Start: 2019-04-15 | End: 2019-04-15 | Stop reason: HOSPADM

## 2019-04-15 RX ORDER — LIDOCAINE HYDROCHLORIDE 20 MG/ML
INJECTION, SOLUTION INFILTRATION; PERINEURAL AS NEEDED
Status: DISCONTINUED | OUTPATIENT
Start: 2019-04-15 | End: 2019-04-15 | Stop reason: SURG

## 2019-04-15 RX ORDER — MORPHINE SULFATE 2 MG/ML
1 INJECTION, SOLUTION INTRAMUSCULAR; INTRAVENOUS EVERY 4 HOURS PRN
Status: DISCONTINUED | OUTPATIENT
Start: 2019-04-15 | End: 2019-04-15

## 2019-04-15 RX ORDER — MORPHINE SULFATE 2 MG/ML
INJECTION, SOLUTION INTRAMUSCULAR; INTRAVENOUS
Status: COMPLETED
Start: 2019-04-15 | End: 2019-04-15

## 2019-04-15 RX ORDER — LEVETIRACETAM 500 MG/1
1500 TABLET ORAL 2 TIMES DAILY
Status: DISCONTINUED | OUTPATIENT
Start: 2019-04-15 | End: 2019-04-16 | Stop reason: HOSPADM

## 2019-04-15 RX ORDER — SUCCINYLCHOLINE CHLORIDE 20 MG/ML
INJECTION INTRAMUSCULAR; INTRAVENOUS AS NEEDED
Status: DISCONTINUED | OUTPATIENT
Start: 2019-04-15 | End: 2019-04-15 | Stop reason: SURG

## 2019-04-15 RX ORDER — NALOXONE HCL 0.4 MG/ML
0.04 VIAL (ML) INJECTION AS NEEDED
Status: DISCONTINUED | OUTPATIENT
Start: 2019-04-15 | End: 2019-04-15 | Stop reason: HOSPADM

## 2019-04-15 RX ORDER — VENLAFAXINE HYDROCHLORIDE 75 MG/1
150 CAPSULE, EXTENDED RELEASE ORAL DAILY
Status: DISCONTINUED | OUTPATIENT
Start: 2019-04-15 | End: 2019-04-16 | Stop reason: HOSPADM

## 2019-04-15 RX ORDER — FENOFIBRATE 145 MG/1
145 TABLET, COATED ORAL DAILY
Status: DISCONTINUED | OUTPATIENT
Start: 2019-04-15 | End: 2019-04-16 | Stop reason: HOSPADM

## 2019-04-15 RX ORDER — SODIUM CHLORIDE 9 MG/ML
INJECTION, SOLUTION INTRAVENOUS AS NEEDED
Status: DISCONTINUED | OUTPATIENT
Start: 2019-04-15 | End: 2019-04-15 | Stop reason: HOSPADM

## 2019-04-15 RX ORDER — HYDROCODONE BITARTRATE AND ACETAMINOPHEN 7.5; 325 MG/1; MG/1
2 TABLET ORAL EVERY 4 HOURS PRN
Status: DISCONTINUED | OUTPATIENT
Start: 2019-04-15 | End: 2019-04-16 | Stop reason: HOSPADM

## 2019-04-15 RX ORDER — FENTANYL CITRATE 50 UG/ML
25 INJECTION, SOLUTION INTRAMUSCULAR; INTRAVENOUS AS NEEDED
Status: DISCONTINUED | OUTPATIENT
Start: 2019-04-15 | End: 2019-04-15 | Stop reason: HOSPADM

## 2019-04-15 RX ORDER — SODIUM CHLORIDE, SODIUM LACTATE, POTASSIUM CHLORIDE, CALCIUM CHLORIDE 600; 310; 30; 20 MG/100ML; MG/100ML; MG/100ML; MG/100ML
1000 INJECTION, SOLUTION INTRAVENOUS CONTINUOUS
Status: DISCONTINUED | OUTPATIENT
Start: 2019-04-15 | End: 2019-04-15 | Stop reason: HOSPADM

## 2019-04-15 RX ORDER — DEXAMETHASONE SODIUM PHOSPHATE 4 MG/ML
4 INJECTION, SOLUTION INTRA-ARTICULAR; INTRALESIONAL; INTRAMUSCULAR; INTRAVENOUS; SOFT TISSUE EVERY 6 HOURS
Status: DISCONTINUED | OUTPATIENT
Start: 2019-04-15 | End: 2019-04-16 | Stop reason: HOSPADM

## 2019-04-15 RX ORDER — HYDRALAZINE HYDROCHLORIDE 20 MG/ML
5 INJECTION INTRAMUSCULAR; INTRAVENOUS
Status: DISCONTINUED | OUTPATIENT
Start: 2019-04-15 | End: 2019-04-15 | Stop reason: HOSPADM

## 2019-04-15 RX ORDER — GLYCOPYRROLATE 0.2 MG/ML
INJECTION INTRAMUSCULAR; INTRAVENOUS AS NEEDED
Status: DISCONTINUED | OUTPATIENT
Start: 2019-04-15 | End: 2019-04-15 | Stop reason: SURG

## 2019-04-15 RX ORDER — BUPIVACAINE HCL/0.9 % NACL/PF 0.1 %
2 PLASTIC BAG, INJECTION (ML) EPIDURAL ONCE
Status: COMPLETED | OUTPATIENT
Start: 2019-04-15 | End: 2019-04-15

## 2019-04-15 RX ORDER — SODIUM CHLORIDE 0.9 % (FLUSH) 0.9 %
3 SYRINGE (ML) INJECTION AS NEEDED
Status: DISCONTINUED | OUTPATIENT
Start: 2019-04-15 | End: 2019-04-15 | Stop reason: HOSPADM

## 2019-04-15 RX ORDER — SODIUM CHLORIDE, SODIUM LACTATE, POTASSIUM CHLORIDE, CALCIUM CHLORIDE 600; 310; 30; 20 MG/100ML; MG/100ML; MG/100ML; MG/100ML
100 INJECTION, SOLUTION INTRAVENOUS CONTINUOUS
Status: DISCONTINUED | OUTPATIENT
Start: 2019-04-15 | End: 2019-04-15 | Stop reason: HOSPADM

## 2019-04-15 RX ORDER — IPRATROPIUM BROMIDE AND ALBUTEROL SULFATE 2.5; .5 MG/3ML; MG/3ML
3 SOLUTION RESPIRATORY (INHALATION) ONCE AS NEEDED
Status: DISCONTINUED | OUTPATIENT
Start: 2019-04-15 | End: 2019-04-15 | Stop reason: HOSPADM

## 2019-04-15 RX ORDER — ONDANSETRON 2 MG/ML
4 INJECTION INTRAMUSCULAR; INTRAVENOUS EVERY 6 HOURS PRN
Status: DISCONTINUED | OUTPATIENT
Start: 2019-04-15 | End: 2019-04-16 | Stop reason: HOSPADM

## 2019-04-15 RX ORDER — METOCLOPRAMIDE HYDROCHLORIDE 5 MG/ML
5 INJECTION INTRAMUSCULAR; INTRAVENOUS
Status: DISCONTINUED | OUTPATIENT
Start: 2019-04-15 | End: 2019-04-15 | Stop reason: HOSPADM

## 2019-04-15 RX ORDER — OXYCODONE AND ACETAMINOPHEN 10; 325 MG/1; MG/1
1 TABLET ORAL ONCE AS NEEDED
Status: COMPLETED | OUTPATIENT
Start: 2019-04-15 | End: 2019-04-15

## 2019-04-15 RX ORDER — CARVEDILOL 25 MG/1
25 TABLET ORAL 2 TIMES DAILY WITH MEALS
Status: DISCONTINUED | OUTPATIENT
Start: 2019-04-15 | End: 2019-04-16 | Stop reason: HOSPADM

## 2019-04-15 RX ORDER — LORAZEPAM 2 MG/ML
2 INJECTION INTRAMUSCULAR EVERY 4 HOURS PRN
Status: DISCONTINUED | OUTPATIENT
Start: 2019-04-15 | End: 2019-04-16 | Stop reason: HOSPADM

## 2019-04-15 RX ORDER — PROPOFOL 10 MG/ML
VIAL (ML) INTRAVENOUS AS NEEDED
Status: DISCONTINUED | OUTPATIENT
Start: 2019-04-15 | End: 2019-04-15 | Stop reason: SURG

## 2019-04-15 RX ORDER — ACETAMINOPHEN 500 MG
1000 TABLET ORAL ONCE
Status: DISCONTINUED | OUTPATIENT
Start: 2019-04-15 | End: 2019-04-15 | Stop reason: HOSPADM

## 2019-04-15 RX ORDER — LABETALOL HYDROCHLORIDE 5 MG/ML
5 INJECTION, SOLUTION INTRAVENOUS
Status: DISCONTINUED | OUTPATIENT
Start: 2019-04-15 | End: 2019-04-15 | Stop reason: HOSPADM

## 2019-04-15 RX ORDER — SODIUM CHLORIDE 9 MG/ML
75 INJECTION, SOLUTION INTRAVENOUS CONTINUOUS
Status: DISCONTINUED | OUTPATIENT
Start: 2019-04-15 | End: 2019-04-16 | Stop reason: HOSPADM

## 2019-04-15 RX ORDER — MIDAZOLAM HYDROCHLORIDE 1 MG/ML
1 INJECTION INTRAMUSCULAR; INTRAVENOUS
Status: DISCONTINUED | OUTPATIENT
Start: 2019-04-15 | End: 2019-04-15 | Stop reason: HOSPADM

## 2019-04-15 RX ORDER — PHENYLEPHRINE HCL IN 0.9% NACL 0.8MG/10ML
SYRINGE (ML) INTRAVENOUS AS NEEDED
Status: DISCONTINUED | OUTPATIENT
Start: 2019-04-15 | End: 2019-04-15 | Stop reason: SURG

## 2019-04-15 RX ORDER — LEVETIRACETAM 500 MG/1
1000 TABLET ORAL 2 TIMES DAILY
Status: DISCONTINUED | OUTPATIENT
Start: 2019-04-15 | End: 2019-04-15

## 2019-04-15 RX ORDER — LOSARTAN POTASSIUM 50 MG/1
100 TABLET ORAL
Status: DISCONTINUED | OUTPATIENT
Start: 2019-04-15 | End: 2019-04-16 | Stop reason: HOSPADM

## 2019-04-15 RX ORDER — FUROSEMIDE 20 MG/1
20 TABLET ORAL DAILY
Status: DISCONTINUED | OUTPATIENT
Start: 2019-04-15 | End: 2019-04-16 | Stop reason: HOSPADM

## 2019-04-15 RX ORDER — MEPERIDINE HYDROCHLORIDE 25 MG/ML
12.5 INJECTION INTRAMUSCULAR; INTRAVENOUS; SUBCUTANEOUS
Status: DISCONTINUED | OUTPATIENT
Start: 2019-04-15 | End: 2019-04-15 | Stop reason: HOSPADM

## 2019-04-15 RX ORDER — HYDROCODONE BITARTRATE AND ACETAMINOPHEN 7.5; 325 MG/1; MG/1
1 TABLET ORAL EVERY 4 HOURS PRN
Status: DISCONTINUED | OUTPATIENT
Start: 2019-04-15 | End: 2019-04-15

## 2019-04-15 RX ORDER — SPIRONOLACTONE 25 MG/1
25 TABLET ORAL DAILY
Status: DISCONTINUED | OUTPATIENT
Start: 2019-04-15 | End: 2019-04-16 | Stop reason: HOSPADM

## 2019-04-15 RX ORDER — DEXAMETHASONE SODIUM PHOSPHATE 4 MG/ML
INJECTION, SOLUTION INTRA-ARTICULAR; INTRALESIONAL; INTRAMUSCULAR; INTRAVENOUS; SOFT TISSUE AS NEEDED
Status: DISCONTINUED | OUTPATIENT
Start: 2019-04-15 | End: 2019-04-15 | Stop reason: SURG

## 2019-04-15 RX ORDER — FENTANYL CITRATE 50 UG/ML
INJECTION, SOLUTION INTRAMUSCULAR; INTRAVENOUS AS NEEDED
Status: DISCONTINUED | OUTPATIENT
Start: 2019-04-15 | End: 2019-04-15 | Stop reason: SURG

## 2019-04-15 RX ORDER — PANTOPRAZOLE SODIUM 40 MG/1
40 TABLET, DELAYED RELEASE ORAL EVERY MORNING
Status: DISCONTINUED | OUTPATIENT
Start: 2019-04-15 | End: 2019-04-16 | Stop reason: HOSPADM

## 2019-04-15 RX ORDER — BACITRACIN ZINC 500 [USP'U]/G
OINTMENT TOPICAL AS NEEDED
Status: DISCONTINUED | OUTPATIENT
Start: 2019-04-15 | End: 2019-04-15 | Stop reason: HOSPADM

## 2019-04-15 RX ORDER — MAGNESIUM HYDROXIDE 1200 MG/15ML
LIQUID ORAL AS NEEDED
Status: DISCONTINUED | OUTPATIENT
Start: 2019-04-15 | End: 2019-04-15 | Stop reason: HOSPADM

## 2019-04-15 RX ORDER — LEVETIRACETAM 5 MG/ML
500 INJECTION INTRAVASCULAR EVERY 12 HOURS SCHEDULED
Status: DISCONTINUED | OUTPATIENT
Start: 2019-04-15 | End: 2019-04-15 | Stop reason: SDUPTHER

## 2019-04-15 RX ORDER — MORPHINE SULFATE 2 MG/ML
2 INJECTION, SOLUTION INTRAMUSCULAR; INTRAVENOUS
Status: DISCONTINUED | OUTPATIENT
Start: 2019-04-15 | End: 2019-04-15 | Stop reason: HOSPADM

## 2019-04-15 RX ORDER — ONDANSETRON 2 MG/ML
INJECTION INTRAMUSCULAR; INTRAVENOUS AS NEEDED
Status: DISCONTINUED | OUTPATIENT
Start: 2019-04-15 | End: 2019-04-15 | Stop reason: SURG

## 2019-04-15 RX ADMIN — QUETIAPINE FUMARATE 100 MG: 100 TABLET ORAL at 20:10

## 2019-04-15 RX ADMIN — CARVEDILOL 25 MG: 25 TABLET, FILM COATED ORAL at 17:16

## 2019-04-15 RX ADMIN — FENTANYL CITRATE 25 MCG: 50 INJECTION, SOLUTION INTRAMUSCULAR; INTRAVENOUS at 10:27

## 2019-04-15 RX ADMIN — SODIUM CHLORIDE, POTASSIUM CHLORIDE, SODIUM LACTATE AND CALCIUM CHLORIDE 1000 ML: 600; 310; 30; 20 INJECTION, SOLUTION INTRAVENOUS at 07:17

## 2019-04-15 RX ADMIN — Medication 2 G: at 08:45

## 2019-04-15 RX ADMIN — OXYCODONE HYDROCHLORIDE AND ACETAMINOPHEN 1 TABLET: 10; 325 TABLET ORAL at 10:28

## 2019-04-15 RX ADMIN — EPHEDRINE SULFATE 10 MG: 50 INJECTION INTRAMUSCULAR; INTRAVENOUS; SUBCUTANEOUS at 09:12

## 2019-04-15 RX ADMIN — PANTOPRAZOLE SODIUM 40 MG: 40 TABLET, DELAYED RELEASE ORAL at 14:22

## 2019-04-15 RX ADMIN — MORPHINE SULFATE 1 MG: 2 INJECTION, SOLUTION INTRAMUSCULAR; INTRAVENOUS at 14:23

## 2019-04-15 RX ADMIN — LOSARTAN POTASSIUM 100 MG: 50 TABLET, FILM COATED ORAL at 14:18

## 2019-04-15 RX ADMIN — Medication 160 MCG: at 09:06

## 2019-04-15 RX ADMIN — FENTANYL CITRATE 25 MCG: 50 INJECTION, SOLUTION INTRAMUSCULAR; INTRAVENOUS at 10:32

## 2019-04-15 RX ADMIN — Medication 80 MCG: at 09:12

## 2019-04-15 RX ADMIN — EPHEDRINE SULFATE 10 MG: 50 INJECTION INTRAMUSCULAR; INTRAVENOUS; SUBCUTANEOUS at 09:13

## 2019-04-15 RX ADMIN — HYDROCODONE BITARTRATE AND ACETAMINOPHEN 1 TABLET: 7.5; 325 TABLET ORAL at 15:32

## 2019-04-15 RX ADMIN — SODIUM CHLORIDE 2 G: 9 INJECTION, SOLUTION INTRAVENOUS at 16:04

## 2019-04-15 RX ADMIN — MORPHINE SULFATE 2 MG: 2 INJECTION, SOLUTION INTRAMUSCULAR; INTRAVENOUS at 17:15

## 2019-04-15 RX ADMIN — DEXAMETHASONE SODIUM PHOSPHATE 4 MG: 4 INJECTION, SOLUTION INTRA-ARTICULAR; INTRALESIONAL; INTRAMUSCULAR; INTRAVENOUS; SOFT TISSUE at 21:33

## 2019-04-15 RX ADMIN — SODIUM CHLORIDE, POTASSIUM CHLORIDE, SODIUM LACTATE AND CALCIUM CHLORIDE 1000 ML: 600; 310; 30; 20 INJECTION, SOLUTION INTRAVENOUS at 07:19

## 2019-04-15 RX ADMIN — DEXAMETHASONE SODIUM PHOSPHATE 4 MG: 4 INJECTION, SOLUTION INTRA-ARTICULAR; INTRALESIONAL; INTRAMUSCULAR; INTRAVENOUS; SOFT TISSUE at 16:04

## 2019-04-15 RX ADMIN — FENOFIBRATE 145 MG: 145 TABLET ORAL at 14:18

## 2019-04-15 RX ADMIN — VENLAFAXINE HYDROCHLORIDE 150 MG: 75 CAPSULE, EXTENDED RELEASE ORAL at 14:18

## 2019-04-15 RX ADMIN — PROPOFOL 150 MG: 10 INJECTION, EMULSION INTRAVENOUS at 08:29

## 2019-04-15 RX ADMIN — SUCCINYLCHOLINE CHLORIDE 160 MG: 20 INJECTION, SOLUTION INTRAMUSCULAR; INTRAVENOUS at 08:29

## 2019-04-15 RX ADMIN — LEVETIRACETAM 1500 MG: 500 TABLET, FILM COATED ORAL at 17:16

## 2019-04-15 RX ADMIN — MIDAZOLAM HYDROCHLORIDE 2 MG: 1 INJECTION, SOLUTION INTRAMUSCULAR; INTRAVENOUS at 08:11

## 2019-04-15 RX ADMIN — MORPHINE SULFATE 2 MG: 2 INJECTION, SOLUTION INTRAMUSCULAR; INTRAVENOUS at 21:28

## 2019-04-15 RX ADMIN — FENTANYL CITRATE 25 MCG: 50 INJECTION, SOLUTION INTRAMUSCULAR; INTRAVENOUS at 10:35

## 2019-04-15 RX ADMIN — ONDANSETRON HYDROCHLORIDE 4 MG: 2 SOLUTION INTRAMUSCULAR; INTRAVENOUS at 09:50

## 2019-04-15 RX ADMIN — SODIUM CHLORIDE 2 G: 9 INJECTION, SOLUTION INTRAVENOUS at 23:46

## 2019-04-15 RX ADMIN — SODIUM CHLORIDE 75 ML/HR: 9 INJECTION, SOLUTION INTRAVENOUS at 11:39

## 2019-04-15 RX ADMIN — MORPHINE SULFATE 2 MG: 2 INJECTION, SOLUTION INTRAMUSCULAR; INTRAVENOUS at 19:28

## 2019-04-15 RX ADMIN — FENTANYL CITRATE 150 MCG: 50 INJECTION INTRAMUSCULAR; INTRAVENOUS at 08:45

## 2019-04-15 RX ADMIN — ROCURONIUM BROMIDE 40 MG: 10 INJECTION INTRAVENOUS at 08:40

## 2019-04-15 RX ADMIN — GLYCOPYRROLATE 0.2 MG: 0.2 INJECTION, SOLUTION INTRAMUSCULAR; INTRAVENOUS at 09:28

## 2019-04-15 RX ADMIN — HYDROCODONE BITARTRATE AND ACETAMINOPHEN 2 TABLET: 7.5; 325 TABLET ORAL at 20:31

## 2019-04-15 RX ADMIN — Medication 80 MCG: at 09:28

## 2019-04-15 RX ADMIN — MORPHINE SULFATE 2 MG: 2 INJECTION, SOLUTION INTRAMUSCULAR; INTRAVENOUS at 23:46

## 2019-04-15 RX ADMIN — DEXAMETHASONE SODIUM PHOSPHATE 4 MG: 4 INJECTION, SOLUTION INTRAMUSCULAR; INTRAVENOUS at 09:50

## 2019-04-15 RX ADMIN — GLYCOPYRROLATE 0.2 MG: 0.2 INJECTION, SOLUTION INTRAMUSCULAR; INTRAVENOUS at 09:23

## 2019-04-15 RX ADMIN — FENTANYL CITRATE 25 MCG: 50 INJECTION, SOLUTION INTRAMUSCULAR; INTRAVENOUS at 10:30

## 2019-04-15 RX ADMIN — ROCURONIUM BROMIDE 10 MG: 10 INJECTION INTRAVENOUS at 08:29

## 2019-04-15 RX ADMIN — SPIRONOLACTONE 25 MG: 25 TABLET ORAL at 14:19

## 2019-04-15 RX ADMIN — Medication 80 MCG: at 09:21

## 2019-04-15 RX ADMIN — EPHEDRINE SULFATE 10 MG: 50 INJECTION INTRAMUSCULAR; INTRAVENOUS; SUBCUTANEOUS at 09:21

## 2019-04-15 RX ADMIN — LIDOCAINE HYDROCHLORIDE 1 EACH: 40 SOLUTION TOPICAL at 08:30

## 2019-04-15 RX ADMIN — LIDOCAINE HYDROCHLORIDE 100 MG: 20 INJECTION, SOLUTION INFILTRATION; PERINEURAL at 08:30

## 2019-04-15 RX ADMIN — FENTANYL CITRATE 100 MCG: 50 INJECTION INTRAMUSCULAR; INTRAVENOUS at 08:29

## 2019-04-15 NOTE — ANESTHESIA POSTPROCEDURE EVALUATION
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) [D43.2])      (Seizures (CMS/HCC) [R56.9])    Surgeon:  Dillon Trevino MD Provider:  Ludmila Ford CRNA    Anesthesia Type:  general ASA Status:  3          Anesthesia Type: general  Last vitals  BP   153/82 (04/15/19 1055)   Temp   97.2 °F (36.2 °C) (04/15/19 1055)   Pulse   74 (04/15/19 1055)   Resp   10 (04/15/19 1055)     SpO2   100 % (04/15/19 1055)     Post Anesthesia Care and Evaluation    Patient location during evaluation: PACU  Patient participation: complete - patient participated  Level of consciousness: awake and alert  Pain management: adequate  Airway patency: patent  Anesthetic complications: No anesthetic complications    Cardiovascular status: acceptable  Respiratory status: acceptable  Hydration status: acceptable    Comments: Blood pressure 153/82, pulse 74, temperature 97.2 °F (36.2 °C), temperature source Temporal, resp. rate 10, SpO2 100 %.    Pt discharged from PACU based on dinh score >8

## 2019-04-15 NOTE — ANESTHESIA PROCEDURE NOTES
Airway  Urgency: elective    Airway not difficult    General Information and Staff    Patient location during procedure: OR  CRNA: Ludmila Ford CRNA    Indications and Patient Condition  Indications for airway management: airway protection    Preoxygenated: yes  MILS maintained throughout  Mask difficulty assessment: 1 - vent by mask    Final Airway Details  Final airway type: endotracheal airway      Successful airway: ETT  Cuffed: yes   Successful intubation technique: direct laryngoscopy  Facilitating devices/methods: intubating stylet  Endotracheal tube insertion site: oral  Blade: Ward  Blade size: 2  ETT size (mm): 7.0  Cormack-Lehane Classification: grade I - full view of glottis  Placement verified by: chest auscultation, capnometry and palpation of cuff   Cuff volume (mL): 6  Measured from: teeth  ETT to teeth (cm): 21  Number of attempts at approach: 1

## 2019-04-15 NOTE — ANESTHESIA PREPROCEDURE EVALUATION
Anesthesia Evaluation     NPO Solid Status: > 8 hours  NPO Liquid Status: > 8 hours           Airway   Mallampati: II  TM distance: >3 FB  Neck ROM: full  No difficulty expected  Comment: 2017 tracheostomy  Dental - normal exam         Pulmonary    (+) sleep apnea, rhonchi, wheezes,     ROS comment: Recent upper resp infection . No antibiotics very hoarse voice that patient states gets better over the day   PE comment: Wheezes and rhonchi scattered that clear with cough  Recent cold and very hoarse sounding speech. Patient states this gets better as the day goes on  Cardiovascular   Exercise tolerance: good (4-7 METS)    Rhythm: regular  Rate: normal    (+) hypertension, hyperlipidemia,       Neuro/Psych  (+) seizures,       ROS Comment: Focal seizures on left face and left arm   GI/Hepatic/Renal/Endo    (+)  GERD well controlled,      Musculoskeletal     Abdominal  - normal exam   Substance History      OB/GYN          Other   (+) arthritis                     Anesthesia Plan    ASA 3     general     intravenous induction   Anesthetic plan, all risks, benefits, and alternatives have been provided, discussed and informed consent has been obtained with: patient.

## 2019-04-15 NOTE — ANESTHESIA PROCEDURE NOTES
Arterial Line    Pre-sedation assessment completed: 4/15/2019 8:12 AM    Patient reassessed immediately prior to procedure    Patient location during procedure: pre-op  Start time: 4/15/2019 8:15 AM  Stop Time:4/15/2019 8:20 AM       Line placed for hemodynamic monitoring, ABGs/Labs/ISTAT and MD/Surgeon request (requested by Dr. Trevino).  Performed By   Anesthesiologist: Kylie Gamble MD  CRNA: Ludmila Ford CRNA  Preanesthetic Checklist  Completed: patient identified, site marked, surgical consent, pre-op evaluation, timeout performed, IV checked, risks and benefits discussed and monitors and equipment checked  Arterial Line Prep   Sterile Tech: mask, cap and gloves  Prep: ChloraPrep  Patient monitoring: continuous pulse oximetry  Arterial Line Procedure   Laterality:right  Location:  radial artery  Catheter size: 20 G   Guidance: ultrasound guided  Number of attempts: 2 (attempted on left, unable to thread catheter)  Successful placement: yes          Post Assessment   Dressing Type: occlusive dressing applied, secured with tape and wrist guard applied.   Complications no  Circ/Move/Sens Assessment: normal and unchanged.   Patient Tolerance: patient tolerated the procedure well with no apparent complications  Additional Notes  Attempted by Ludmila Amin CRNA on left under ultrasound guidance. Unable to pass catheter. One attempt on right under ultrasound. Difficult placement as positional to allow catheter to thread, however successful on first attempt with repositioning.

## 2019-04-16 VITALS
BODY MASS INDEX: 33.4 KG/M2 | SYSTOLIC BLOOD PRESSURE: 147 MMHG | HEART RATE: 80 BPM | HEIGHT: 67 IN | TEMPERATURE: 98.1 F | WEIGHT: 212.8 LBS | DIASTOLIC BLOOD PRESSURE: 91 MMHG | OXYGEN SATURATION: 97 % | RESPIRATION RATE: 14 BRPM

## 2019-04-16 LAB
ANION GAP SERPL CALCULATED.3IONS-SCNC: 10 MMOL/L (ref 4–13)
BUN BLD-MCNC: 19 MG/DL (ref 5–21)
BUN/CREAT SERPL: 12.9 (ref 7–25)
CALCIUM SPEC-SCNC: 8.7 MG/DL (ref 8.4–10.4)
CHLORIDE SERPL-SCNC: 106 MMOL/L (ref 98–110)
CO2 SERPL-SCNC: 24 MMOL/L (ref 24–31)
CREAT BLD-MCNC: 1.47 MG/DL (ref 0.5–1.4)
DEPRECATED RDW RBC AUTO: 45.3 FL (ref 40–54)
ERYTHROCYTE [DISTWIDTH] IN BLOOD BY AUTOMATED COUNT: 14.4 % (ref 12–15)
GFR SERPL CREATININE-BSD FRML MDRD: 50 ML/MIN/1.73
GLUCOSE BLD-MCNC: 140 MG/DL (ref 70–100)
HCT VFR BLD AUTO: 35.2 % (ref 40–52)
HGB BLD-MCNC: 11.6 G/DL (ref 14–18)
MCH RBC QN AUTO: 28.5 PG (ref 28–32)
MCHC RBC AUTO-ENTMCNC: 33 G/DL (ref 33–36)
MCV RBC AUTO: 86.5 FL (ref 82–95)
PLATELET # BLD AUTO: 356 10*3/MM3 (ref 130–400)
PMV BLD AUTO: 10.3 FL (ref 6–12)
POTASSIUM BLD-SCNC: 4.6 MMOL/L (ref 3.5–5.3)
RBC # BLD AUTO: 4.07 10*6/MM3 (ref 4.8–5.9)
SODIUM BLD-SCNC: 140 MMOL/L (ref 135–145)
WBC NRBC COR # BLD: 9.91 10*3/MM3 (ref 4.8–10.8)

## 2019-04-16 PROCEDURE — 99024 POSTOP FOLLOW-UP VISIT: CPT | Performed by: NEUROLOGICAL SURGERY

## 2019-04-16 PROCEDURE — 25010000002 MORPHINE PER 10 MG: Performed by: NEUROLOGICAL SURGERY

## 2019-04-16 PROCEDURE — 97165 OT EVAL LOW COMPLEX 30 MIN: CPT | Performed by: OCCUPATIONAL THERAPIST

## 2019-04-16 PROCEDURE — 25010000002 DEXAMETHASONE PER 1 MG: Performed by: NURSE PRACTITIONER

## 2019-04-16 PROCEDURE — 80048 BASIC METABOLIC PNL TOTAL CA: CPT | Performed by: NURSE PRACTITIONER

## 2019-04-16 PROCEDURE — 85027 COMPLETE CBC AUTOMATED: CPT | Performed by: NURSE PRACTITIONER

## 2019-04-16 PROCEDURE — 97161 PT EVAL LOW COMPLEX 20 MIN: CPT

## 2019-04-16 RX ORDER — HYDROCODONE BITARTRATE AND ACETAMINOPHEN 7.5; 325 MG/1; MG/1
1 TABLET ORAL EVERY 8 HOURS PRN
Qty: 45 TABLET | Refills: 0 | Status: SHIPPED | OUTPATIENT
Start: 2019-04-16 | End: 2019-04-25

## 2019-04-16 RX ORDER — LEVETIRACETAM 750 MG/1
1500 TABLET ORAL 2 TIMES DAILY
Qty: 120 TABLET | Refills: 2 | Status: SHIPPED | OUTPATIENT
Start: 2019-04-16 | End: 2019-06-06 | Stop reason: HOSPADM

## 2019-04-16 RX ADMIN — FENOFIBRATE 145 MG: 145 TABLET ORAL at 08:13

## 2019-04-16 RX ADMIN — SPIRONOLACTONE 25 MG: 25 TABLET ORAL at 08:14

## 2019-04-16 RX ADMIN — HYDROCODONE BITARTRATE AND ACETAMINOPHEN 2 TABLET: 7.5; 325 TABLET ORAL at 09:20

## 2019-04-16 RX ADMIN — HYDROCODONE BITARTRATE AND ACETAMINOPHEN 2 TABLET: 7.5; 325 TABLET ORAL at 01:50

## 2019-04-16 RX ADMIN — PANTOPRAZOLE SODIUM 40 MG: 40 TABLET, DELAYED RELEASE ORAL at 07:12

## 2019-04-16 RX ADMIN — MORPHINE SULFATE 2 MG: 2 INJECTION, SOLUTION INTRAMUSCULAR; INTRAVENOUS at 03:47

## 2019-04-16 RX ADMIN — DEXAMETHASONE SODIUM PHOSPHATE 4 MG: 4 INJECTION, SOLUTION INTRA-ARTICULAR; INTRALESIONAL; INTRAMUSCULAR; INTRAVENOUS; SOFT TISSUE at 09:20

## 2019-04-16 RX ADMIN — MORPHINE SULFATE 2 MG: 2 INJECTION, SOLUTION INTRAMUSCULAR; INTRAVENOUS at 08:22

## 2019-04-16 RX ADMIN — MORPHINE SULFATE 2 MG: 2 INJECTION, SOLUTION INTRAMUSCULAR; INTRAVENOUS at 06:01

## 2019-04-16 RX ADMIN — LOSARTAN POTASSIUM 100 MG: 50 TABLET, FILM COATED ORAL at 08:14

## 2019-04-16 RX ADMIN — CARVEDILOL 25 MG: 25 TABLET, FILM COATED ORAL at 08:13

## 2019-04-16 RX ADMIN — DEXAMETHASONE SODIUM PHOSPHATE 4 MG: 4 INJECTION, SOLUTION INTRA-ARTICULAR; INTRALESIONAL; INTRAMUSCULAR; INTRAVENOUS; SOFT TISSUE at 03:47

## 2019-04-16 RX ADMIN — VENLAFAXINE HYDROCHLORIDE 150 MG: 75 CAPSULE, EXTENDED RELEASE ORAL at 08:14

## 2019-04-16 RX ADMIN — LEVETIRACETAM 1500 MG: 500 TABLET, FILM COATED ORAL at 08:13

## 2019-04-19 ENCOUNTER — OFFICE VISIT (OUTPATIENT)
Dept: NEUROSURGERY | Facility: CLINIC | Age: 54
End: 2019-04-19

## 2019-04-19 VITALS — HEIGHT: 67 IN | BODY MASS INDEX: 33.39 KG/M2 | WEIGHT: 212.74 LBS

## 2019-04-19 DIAGNOSIS — F17.210 CIGARETTE SMOKER: ICD-10-CM

## 2019-04-19 DIAGNOSIS — D43.2 NEOPLASM OF UNCERTAIN BEHAVIOR OF BRAIN (HCC): Primary | ICD-10-CM

## 2019-04-19 PROCEDURE — 99024 POSTOP FOLLOW-UP VISIT: CPT | Performed by: NURSE PRACTITIONER

## 2019-04-19 NOTE — PROGRESS NOTES
Patient seen today to have pain is removed.  4 staples were removed from the head.  Incision is clean dry and intact.  Patient was instructed to go home clean the wound with soap and water and dry hair.  Told to call if he has any questions or concerns.  We will follow-up with the patient once pathology results are available

## 2019-04-19 NOTE — PATIENT INSTRUCTIONS
PATIENT TO CONTINUE TO FOLLOW UP WITH HIS PRIMARY CARE PROVIDER FOR YEARLY PHYSICAL EXAMS TO ENSURE COMPLETE HEALTH MAINTENANCE      BMI for Adults  Body mass index (BMI) is a number that is calculated from a person's weight and height. In most adults, the number is used to find how much of an adult's weight is made up of fat. BMI is not as accurate as a direct measure of body fat.  How is BMI calculated?  BMI is calculated by dividing weight in kilograms by height in meters squared. It can also be calculated by dividing weight in pounds by height in inches squared, then multiplying the resulting number by 703. Charts are available to help you find your BMI quickly and easily without doing this calculation.  How is BMI interpreted?  Health care professionals use BMI charts to identify whether an adult is underweight, at a normal weight, or overweight based on the following guidelines:  · Underweight: BMI less than 18.5.  · Normal weight: BMI between 18.5 and 24.9.  · Overweight: BMI between 25 and 29.9.  · Obese: BMI of 30 and above.    BMI is usually interpreted the same for males and females.  Weight includes both fat and muscle, so someone with a muscular build, such as an athlete, may have a BMI that is higher than 24.9. In cases like these, BMI may not accurately depict body fat. To determine if excess body fat is the cause of a BMI of 25 or higher, further assessments may need to be done by a health care provider.  Why is BMI a useful tool?  BMI is used to identify a possible weight problem that may be related to a medical problem or may increase the risk for medical problems. BMI can also be used to promote changes to reach a healthy weight.  This information is not intended to replace advice given to you by your health care provider. Make sure you discuss any questions you have with your health care provider.  Document Released: 08/29/2005 Document Revised: 04/27/2017 Document Reviewed: 05/15/2015  Andrew  Interactive Patient Education © 2018 [employer]        Steps to Quit Smoking  Smoking tobacco can be harmful to your health and can affect almost every organ in your body. Smoking puts you, and those around you, at risk for developing many serious chronic diseases. Quitting smoking is difficult, but it is one of the best things that you can do for your health. It is never too late to quit.  What are the benefits of quitting smoking?  When you quit smoking, you lower your risk of developing serious diseases and conditions, such as:  · Lung cancer or lung disease, such as COPD.  · Heart disease.  · Stroke.  · Heart attack.  · Infertility.  · Osteoporosis and bone fractures.    Additionally, symptoms such as coughing, wheezing, and shortness of breath may get better when you quit. You may also find that you get sick less often because your body is stronger at fighting off colds and infections. If you are pregnant, quitting smoking can help to reduce your chances of having a baby of low birth weight.  How do I get ready to quit?  When you decide to quit smoking, create a plan to make sure that you are successful. Before you quit:  · Pick a date to quit. Set a date within the next two weeks to give you time to prepare.  · Write down the reasons why you are quitting. Keep this list in places where you will see it often, such as on your bathroom mirror or in your car or wallet.  · Identify the people, places, things, and activities that make you want to smoke (triggers) and avoid them. Make sure to take these actions:  ? Throw away all cigarettes at home, at work, and in your car.  ? Throw away smoking accessories, such as ashtrays and lighters.  ? Clean your car and make sure to empty the ashtray.  ? Clean your home, including curtains and carpets.  · Tell your family, friends, and coworkers that you are quitting. Support from your loved ones can make quitting easier.  · Talk with your health care provider about your  options for quitting smoking.  · Find out what treatment options are covered by your health insurance.    What strategies can I use to quit smoking?  Talk with your healthcare provider about different strategies to quit smoking. Some strategies include:  · Quitting smoking altogether instead of gradually lessening how much you smoke over a period of time. Research shows that quitting “cold turkey” is more successful than gradually quitting.  · Attending in-person counseling to help you build problem-solving skills. You are more likely to have success in quitting if you attend several counseling sessions. Even short sessions of 10 minutes can be effective.  · Finding resources and support systems that can help you to quit smoking and remain smoke-free after you quit. These resources are most helpful when you use them often. They can include:  ? Online chats with a counselor.  ? Telephone quitlines.  ? Printed self-help materials.  ? Support groups or group counseling.  ? Text messaging programs.  ? Mobile phone applications.  · Taking medicines to help you quit smoking. (If you are pregnant or breastfeeding, talk with your health care provider first.) Some medicines contain nicotine and some do not. Both types of medicines help with cravings, but the medicines that include nicotine help to relieve withdrawal symptoms. Your health care provider may recommend:  ? Nicotine patches, gum, or lozenges.  ? Nicotine inhalers or sprays.  ? Non-nicotine medicine that is taken by mouth.    Talk with your health care provider about combining strategies, such as taking medicines while you are also receiving in-person counseling. Using these two strategies together makes you more likely to succeed in quitting than if you used either strategy on its own.  If you are pregnant or breastfeeding, talk with your health care provider about finding counseling or other support strategies to quit smoking. Do not take medicine to help you  quit smoking unless told to do so by your health care provider.  What things can I do to make it easier to quit?  Quitting smoking might feel overwhelming at first, but there is a lot that you can do to make it easier. Take these important actions:  · Reach out to your family and friends and ask that they support and encourage you during this time. Call telephone quitlines, reach out to support groups, or work with a counselor for support.  · Ask people who smoke to avoid smoking around you.  · Avoid places that trigger you to smoke, such as bars, parties, or smoke-break areas at work.  · Spend time around people who do not smoke.  · Lessen stress in your life, because stress can be a smoking trigger for some people. To lessen stress, try:  ? Exercising regularly.  ? Deep-breathing exercises.  ? Yoga.  ? Meditating.  ? Performing a body scan. This involves closing your eyes, scanning your body from head to toe, and noticing which parts of your body are particularly tense. Purposefully relax the muscles in those areas.  · Download or purchase mobile phone or tablet apps (applications) that can help you stick to your quit plan by providing reminders, tips, and encouragement. There are many free apps, such as QuitGuide from the CDC (Centers for Disease Control and Prevention). You can find other support for quitting smoking (smoking cessation) through smokefree.gov and other websites.    How will I feel when I quit smoking?  Within the first 24 hours of quitting smoking, you may start to feel some withdrawal symptoms. These symptoms are usually most noticeable 2-3 days after quitting, but they usually do not last beyond 2-3 weeks. Changes or symptoms that you might experience include:  · Mood swings.  · Restlessness, anxiety, or irritation.  · Difficulty concentrating.  · Dizziness.  · Strong cravings for sugary foods in addition to nicotine.  · Mild weight gain.  · Constipation.  · Nausea.  · Coughing or a sore  throat.  · Changes in how your medicines work in your body.  · A depressed mood.  · Difficulty sleeping (insomnia).    After the first 2-3 weeks of quitting, you may start to notice more positive results, such as:  · Improved sense of smell and taste.  · Decreased coughing and sore throat.  · Slower heart rate.  · Lower blood pressure.  · Clearer skin.  · The ability to breathe more easily.  · Fewer sick days.    Quitting smoking is very challenging for most people. Do not get discouraged if you are not successful the first time. Some people need to make many attempts to quit before they achieve long-term success. Do your best to stick to your quit plan, and talk with your health care provider if you have any questions or concerns.  This information is not intended to replace advice given to you by your health care provider. Make sure you discuss any questions you have with your health care provider.  Document Released: 12/12/2002 Document Revised: 07/24/2018 Document Reviewed: 05/03/2016  [employer] Interactive Patient Education © 2019 [employer].

## 2019-04-25 ENCOUNTER — TELEPHONE (OUTPATIENT)
Dept: NEUROSURGERY | Facility: CLINIC | Age: 54
End: 2019-04-25

## 2019-04-25 NOTE — TELEPHONE ENCOUNTER
Dr King called and spoke w/Dr Trevino stating the pathology specimen along with the MRI imaging is going to [facility] for review.  She didn't give any clue as to what this was showing as of right now.  She states this could take another 1-2 weeks.  I have notified the patient's mother and explained this & she understands we will call her as soon as we know something.      raine quarles CMA

## 2019-04-27 DIAGNOSIS — I15.9 SECONDARY HYPERTENSION: ICD-10-CM

## 2019-04-27 DIAGNOSIS — I10 HYPERTENSION, UNSPECIFIED TYPE: ICD-10-CM

## 2019-04-29 RX ORDER — SPIRONOLACTONE 25 MG/1
TABLET ORAL
Qty: 90 TABLET | Refills: 1 | Status: ON HOLD | OUTPATIENT
Start: 2019-04-29 | End: 2019-06-04

## 2019-04-29 RX ORDER — LOSARTAN POTASSIUM 100 MG/1
TABLET ORAL
Qty: 90 TABLET | Refills: 1 | Status: ON HOLD | OUTPATIENT
Start: 2019-04-29 | End: 2019-06-04

## 2019-05-01 ENCOUNTER — OFFICE VISIT (OUTPATIENT)
Dept: NEUROLOGY | Facility: CLINIC | Age: 54
End: 2019-05-01

## 2019-05-01 VITALS
SYSTOLIC BLOOD PRESSURE: 126 MMHG | HEIGHT: 67 IN | HEART RATE: 82 BPM | BODY MASS INDEX: 30.45 KG/M2 | DIASTOLIC BLOOD PRESSURE: 82 MMHG | WEIGHT: 194 LBS

## 2019-05-01 DIAGNOSIS — D43.2 NEOPLASM OF UNCERTAIN BEHAVIOR OF BRAIN (HCC): ICD-10-CM

## 2019-05-01 DIAGNOSIS — R56.9 SEIZURES (HCC): Primary | ICD-10-CM

## 2019-05-01 PROCEDURE — 99214 OFFICE O/P EST MOD 30 MIN: CPT | Performed by: PHYSICIAN ASSISTANT

## 2019-05-02 ENCOUNTER — TELEPHONE (OUTPATIENT)
Dept: NEUROSURGERY | Facility: CLINIC | Age: 54
End: 2019-05-02

## 2019-05-02 NOTE — TELEPHONE ENCOUNTER
Patient's daughter calls to see if we have a final reading on her dad's pathology.  I called her back but she didn't answer so I left a message letting her know we do not have the final back yet.      raine quarles CMA

## 2019-05-09 ENCOUNTER — OFFICE VISIT (OUTPATIENT)
Dept: NEUROSURGERY | Facility: CLINIC | Age: 54
End: 2019-05-09

## 2019-05-09 VITALS — HEIGHT: 67 IN | WEIGHT: 194 LBS | BODY MASS INDEX: 30.45 KG/M2

## 2019-05-09 DIAGNOSIS — F17.210 CIGARETTE SMOKER: ICD-10-CM

## 2019-05-09 DIAGNOSIS — C71.9 ASTROCYTOMA BRAIN TUMOR (HCC): ICD-10-CM

## 2019-05-09 DIAGNOSIS — C71.9 ASTROCYTOMA BRAIN TUMOR (HCC): Primary | ICD-10-CM

## 2019-05-09 DIAGNOSIS — R56.9 SEIZURES (HCC): Primary | ICD-10-CM

## 2019-05-09 PROCEDURE — 99024 POSTOP FOLLOW-UP VISIT: CPT | Performed by: NEUROLOGICAL SURGERY

## 2019-05-09 RX ORDER — DEXAMETHASONE 1 MG
2 TABLET ORAL 2 TIMES DAILY WITH MEALS
Refills: 4 | Status: ON HOLD | COMMUNITY
Start: 2019-05-01 | End: 2019-06-20 | Stop reason: SDUPTHER

## 2019-05-09 RX ORDER — ESLICARBAZEPINE ACETATE 800 MG/1
1600 TABLET ORAL DAILY
Qty: 60 TABLET | Refills: 3
Start: 2019-05-09 | End: 2019-05-17 | Stop reason: ALTCHOICE

## 2019-05-09 RX ORDER — LEVETIRACETAM 500 MG/1
TABLET ORAL
Refills: 3 | Status: ON HOLD | COMMUNITY
Start: 2019-05-01 | End: 2019-06-03

## 2019-05-09 RX ORDER — ESLICARBAZEPINE ACETATE 800 MG/1
800 TABLET ORAL DAILY
Qty: 30 TABLET
Start: 2019-05-09 | End: 2019-05-09 | Stop reason: SDUPTHER

## 2019-05-09 NOTE — PROGRESS NOTES
Subjective   Lukasz Savage is a 54 y.o. male is here today for follow-up.    History of PRES with seizures, AED D/C'd 9/2017 with no further seizure.  Last ETOH 7/2017.  New symptoms over the last 3 weeks including numbness on the left side of his body including the face.  Some weakness in the left side of the face.  The symptoms are associated with headache.  This headache tends to dominate on the right but will eventually involve both sides.  No tonic-clonic seizure activity or persistent neurologic deficit.  Relates that these episodes are brief, lasting 4-8 minutes and resolving completely.      Recently presented with increased difficulty with weakness in his left hand and continues to have occasional episodes of transient symptoms in the left upper extremity, face and at times left lower extremity.  Symptoms include involuntary twitching and moving of the left upper extremity, face on the left without loss of consciousness or generalized seizure-like activity.  The patient also has occasional stumbling involving the left lower extremity and will drop objects from his left hand.  He feels as though his headache has intensified somewhat.    28 March 2019 EEG did not show seizure activity.  2 April 2019 carotid vertebral ultrasound not showing any significant stenosis or issues.  2 April 2019 MRI of the brain shows substantial abnormalities.    The patient is now status post craniotomy and is healing well from this.  He follows with Dr. Trevino of neurosurgery.  Follow-up pathology continues to be pending.    The patient has had his Keppra increased to 1500 mg twice per day but continues to have some breakthrough seizure-like activity.      Neurologic Problem   The patient's primary symptoms include focal sensory loss, focal weakness, a loss of balance, memory loss, slurred speech and weakness. This is a chronic problem. The current episode started more than 1 month ago. The neurological problem developed  suddenly. The problem is unchanged. There was left-sided focality noted. Associated symptoms include fatigue, headaches and nausea. Pertinent negatives include no vomiting. Past treatments include sleep, bed rest, position change and acetaminophen. The treatment provided no relief. (PRES 2017)   Migraine    This is a chronic problem. The current episode started more than 1 year ago. The problem occurs intermittently. The problem has been unchanged. The pain is located in the left unilateral region. The pain does not radiate. The pain quality is similar to prior headaches. The pain is severe. Associated symptoms include a loss of balance, nausea, phonophobia, photophobia, scalp tenderness, seizures and weakness. Pertinent negatives include no vomiting. The symptoms are aggravated by activity, bright light, noise and Valsalva. He has tried acetaminophen, antidepressants and NSAIDs for the symptoms. The treatment provided no relief. His past medical history is significant for migraine headaches. (PRES 2017)        The following portions of the patient's history were reviewed and updated as appropriate: allergies, current medications, past family history, past medical history, past social history, past surgical history and problem list.    Review of Systems   Constitutional: Positive for fatigue.   HENT: Negative for trouble swallowing.    Eyes: Positive for photophobia.   Respiratory: Negative.    Cardiovascular: Negative.    Gastrointestinal: Positive for nausea. Negative for vomiting.   Endocrine: Negative.    Genitourinary: Negative.    Musculoskeletal: Positive for gait problem.   Skin: Negative.    Allergic/Immunologic: Negative.    Neurological: Positive for focal weakness, seizures, weakness, headache, loss of balance and memory problem. Negative for facial asymmetry and speech difficulty.   Hematological: Negative.    Psychiatric/Behavioral: Positive for dysphoric mood, memory loss and sleep disturbance.        Objective   Physical Exam   Constitutional: He is oriented to person, place, and time.   HENT:   Head: Normocephalic and atraumatic.   Right Ear: Hearing and external ear normal.   Left Ear: Hearing and external ear normal.   Nose: Nose normal.   Mouth/Throat: Uvula is midline, oropharynx is clear and moist and mucous membranes are normal.   Eyes: Conjunctivae, EOM and lids are normal. Pupils are equal, round, and reactive to light.   Fundoscopic exam:       The right eye shows no hemorrhage and no papilledema. The right eye shows red reflex.        The left eye shows no hemorrhage and no papilledema. The left eye shows red reflex.   Neck: Trachea normal, normal range of motion and phonation normal. No JVD present. Carotid bruit is not present.   Cardiovascular: Normal rate, regular rhythm and normal heart sounds.   No murmur heard.  Pulmonary/Chest: Effort normal and breath sounds normal.   Neurological: He is alert and oriented to person, place, and time. He displays no atrophy and no tremor. No cranial nerve deficit or sensory deficit. He exhibits normal muscle tone. Coordination and gait normal. GCS eye subscore is 4. GCS verbal subscore is 5. GCS motor subscore is 6.   Reflex Scores:       Tricep reflexes are 2+ on the right side and 2+ on the left side.       Bicep reflexes are 2+ on the right side and 2+ on the left side.       Brachioradialis reflexes are 2+ on the right side and 2+ on the left side.       Patellar reflexes are 2+ on the right side and 2+ on the left side.       Achilles reflexes are 2+ on the right side and 2+ on the left side.  Mild left  weakness, mild left pronator drift.  Moving     Skin: Skin is warm, dry and intact.   Psychiatric: He has a normal mood and affect. His speech is normal and behavior is normal. Judgment and thought content normal. Cognition and memory are normal.   Nursing note and vitals reviewed.        Assessment/Plan   Lukasz was seen today for brain  neoplasm.    Diagnoses and all orders for this visit:    Seizures (CMS/HCC)  -     APTIOM 800 MG tablet tablet; Take 1 tablet by mouth Daily.    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  He is following up with neurosurgery as scheduled.    He continues to have breakthrough seizure.  We will add Aptiom 400 mg/day for 1 week and then increase to 800 mg/day.    Today's findings, medication recommendations, patient and family questions are reviewed in detail.    15 minutes of a 25 minute outpatient visit was spent in counseling and coordination of care today.           Dictated utilizing Dragon dictation.

## 2019-05-09 NOTE — PROGRESS NOTES
SUBJECTIVE:  Patient ID: Lukasz Savage is a 54 y.o. male is here today for follow-up.    Chief Complaint: Brain tumor  Chief Complaint   Patient presents with   • Brain Tumor     patient is here to discuss pathology results; patient underwent biopsy on 4/15/19 @ [facility].       HPI  54-year-old gentleman went to the operating room on April 15, 2019 for a right frontal biopsy under stereotactic guidance.  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  He is still complaining of left focal motor facial seizures.  He is on Keppra and per report has been started on a second agent but that is not available to me.  The family feels that the seizures are not improved with the second agent.  Otherwise he denies any headaches no nausea no vomiting no cognitive difficulty.    The following portions of the patient's history were reviewed and updated as appropriate: allergies, current medications, past family history, past medical history, past social history, past surgical history and problem list.    OBJECTIVE:    Review of Systems   Neurological: Positive for seizures.   All other systems reviewed and are negative.         Physical Exam   Constitutional: He is oriented to person, place, and time. He appears well-developed and well-nourished.   HENT:   Head: Normocephalic and atraumatic.   Right Ear: Hearing normal.   Left Ear: Hearing normal.   Eyes: EOM are normal. Pupils are equal, round, and reactive to light.   Neck: Normal range of motion.   Neurological: He is alert and oriented to person, place, and time. He has normal strength and normal reflexes. No cranial nerve deficit or sensory deficit. He displays a negative Romberg sign. GCS eye subscore is 4. GCS verbal subscore is 5. GCS motor subscore is 6. He displays no Babinski's sign on the right side. He displays no Babinski's sign on the left side.   Psychiatric: His speech is normal. Judgment normal. Cognition and memory are normal.       Neurologic  Exam     Mental Status   Oriented to person, place, and time.   Speech: speech is normal     Cranial Nerves     CN III, IV, VI   Pupils are equal, round, and reactive to light.  Extraocular motions are normal.     Motor Exam     Strength   Strength 5/5 throughout.   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  In my opinion this is not amenable to a maximal safe surgical resection I think any effective surgery that would give a survival benefit would likely severely disabled him.  I am going to insist that he get a second opinion from the [facility] we will make a referral to Drew Gutierrez.  We will also make a referral to our radiation oncology and our local oncologist for chemotherapeutic discussions.  I did extensive discussion with the patient and his family.  Their questions and concerns were patiently addressed.  I will reach out to the [facility] to discuss the case with Dr. Gutierrez and we will get him set up for radiation oncology and oncology referrals.  I will see him in follow-up in about 3 months with a repeat MRI of the brain      1. Astrocytoma brain tumor (CMS/HCC)    2. Cigarette smoker    3. BMI 30.0-30.9,adult            Return in about 3 months (around 8/9/2019) for follow up w/scan - DR MEJIA.      Dillon Mejia MD

## 2019-05-09 NOTE — PATIENT INSTRUCTIONS
PATIENT TO CONTINUE TO FOLLOW UP WITH HIS PRIMARY CARE PROVIDER FOR YEARLY PHYSICAL EXAMS TO ENSURE COMPLETE HEALTH MAINTENANCE      BMI for Adults  Body mass index (BMI) is a number that is calculated from a person's weight and height. In most adults, the number is used to find how much of an adult's weight is made up of fat. BMI is not as accurate as a direct measure of body fat.  How is BMI calculated?  BMI is calculated by dividing weight in kilograms by height in meters squared. It can also be calculated by dividing weight in pounds by height in inches squared, then multiplying the resulting number by 703. Charts are available to help you find your BMI quickly and easily without doing this calculation.  How is BMI interpreted?  Health care professionals use BMI charts to identify whether an adult is underweight, at a normal weight, or overweight based on the following guidelines:  · Underweight: BMI less than 18.5.  · Normal weight: BMI between 18.5 and 24.9.  · Overweight: BMI between 25 and 29.9.  · Obese: BMI of 30 and above.    BMI is usually interpreted the same for males and females.  Weight includes both fat and muscle, so someone with a muscular build, such as an athlete, may have a BMI that is higher than 24.9. In cases like these, BMI may not accurately depict body fat. To determine if excess body fat is the cause of a BMI of 25 or higher, further assessments may need to be done by a health care provider.  Why is BMI a useful tool?  BMI is used to identify a possible weight problem that may be related to a medical problem or may increase the risk for medical problems. BMI can also be used to promote changes to reach a healthy weight.  This information is not intended to replace advice given to you by your health care provider. Make sure you discuss any questions you have with your health care provider.  Document Released: 08/29/2005 Document Revised: 04/27/2017 Document Reviewed: 05/15/2015  Andrew  Interactive Patient Education © 2018 [employer]

## 2019-05-10 ENCOUNTER — TELEPHONE (OUTPATIENT)
Dept: NEUROLOGY | Facility: CLINIC | Age: 54
End: 2019-05-10

## 2019-05-10 ENCOUNTER — HOSPITAL ENCOUNTER (EMERGENCY)
Facility: HOSPITAL | Age: 54
Discharge: HOME | End: 2019-05-10
Admitting: EMERGENCY MEDICINE

## 2019-05-10 ENCOUNTER — APPOINTMENT (OUTPATIENT)
Dept: CT IMAGING | Facility: HOSPITAL | Age: 54
End: 2019-05-10

## 2019-05-10 VITALS
BODY MASS INDEX: 29.55 KG/M2 | HEART RATE: 69 BPM | WEIGHT: 195 LBS | TEMPERATURE: 97.9 F | OXYGEN SATURATION: 98 % | HEIGHT: 68 IN | DIASTOLIC BLOOD PRESSURE: 68 MMHG | SYSTOLIC BLOOD PRESSURE: 110 MMHG | RESPIRATION RATE: 16 BRPM
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
## 2019-05-10 DIAGNOSIS — S39.012A STRAIN OF LUMBAR REGION, INITIAL ENCOUNTER: ICD-10-CM

## 2019-05-10 DIAGNOSIS — S20.229A CONTUSION OF BACK, UNSPECIFIED LATERALITY, INITIAL ENCOUNTER: ICD-10-CM

## 2019-05-10 PROCEDURE — 25010000002 ORPHENADRINE CITRATE PER 60 MG: Performed by: NURSE PRACTITIONER

## 2019-05-10 PROCEDURE — 72131 CT LUMBAR SPINE W/O DYE: CPT

## 2019-05-10 PROCEDURE — 96372 THER/PROPH/DIAG INJ SC/IM: CPT

## 2019-05-10 PROCEDURE — 99283 EMERGENCY DEPT VISIT LOW MDM: CPT

## 2019-05-10 PROCEDURE — 25010000002 ONDANSETRON PER 1 MG: Performed by: NURSE PRACTITIONER

## 2019-05-10 RX ORDER — OXYCODONE HYDROCHLORIDE AND ACETAMINOPHEN 5; 325 MG/1; MG/1
1 TABLET ORAL EVERY 4 HOURS PRN
Qty: 12 TABLET | Refills: 0 | Status: SHIPPED | OUTPATIENT
Start: 2019-05-10 | End: 2019-05-17

## 2019-05-10 RX ORDER — ORPHENADRINE CITRATE 30 MG/ML
60 INJECTION INTRAMUSCULAR; INTRAVENOUS ONCE
Status: COMPLETED | OUTPATIENT
Start: 2019-05-10 | End: 2019-05-10

## 2019-05-10 RX ORDER — CYCLOBENZAPRINE HCL 10 MG
10 TABLET ORAL 3 TIMES DAILY PRN
Qty: 20 TABLET | Refills: 0 | Status: SHIPPED | OUTPATIENT
Start: 2019-05-10 | End: 2019-05-17

## 2019-05-10 RX ORDER — ONDANSETRON 2 MG/ML
4 INJECTION INTRAMUSCULAR; INTRAVENOUS ONCE
Status: COMPLETED | OUTPATIENT
Start: 2019-05-10 | End: 2019-05-10

## 2019-05-10 RX ADMIN — ORPHENADRINE CITRATE 60 MG: 30 INJECTION INTRAMUSCULAR; INTRAVENOUS at 11:10

## 2019-05-10 RX ADMIN — ONDANSETRON HYDROCHLORIDE 4 MG: 2 SOLUTION INTRAMUSCULAR; INTRAVENOUS at 11:12

## 2019-05-10 RX ADMIN — HYDROMORPHONE HYDROCHLORIDE 1 MG: 1 INJECTION, SOLUTION INTRAMUSCULAR; INTRAVENOUS; SUBCUTANEOUS at 11:11

## 2019-05-10 NOTE — TELEPHONE ENCOUNTER
----- Message from YEVGENIY Ibanez sent at 5/9/2019  5:18 PM CDT -----  I talked to Ivonne (Mom) and increased Aptiom to 1600 mg per day, sent Rx and invited them for more samples if they need them until precert and such

## 2019-05-14 PROBLEM — Z71.9 ENCOUNTER FOR CONSULTATION: Status: ACTIVE | Noted: 2019-05-14

## 2019-05-16 NOTE — PROGRESS NOTES
[facility]  MD Surekha Shirley BSN, PA-C  _______________________________________________  [facility]  Department of Radiation Oncology  [address] [place] [id number]  Office: [phone]  Fax: [phone]    DATE:  05/17/2019  PATIENT:   Lukasz Savage 1965                                 MEDICAL RECORD #:  [id number]                                                       REASON FOR CONSULTATION:  Lukasz Savage is a very pleasant male that has been referred to our office by Dillon Trevino MD to discuss radiotherapy options for Grade 3 Anaplastic Astrocytoma, 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 He is here today in clinic with his family. Reports activity change, fatigue, dizziness, seizures, speech difficulty, back pain, weakness, light-headedness, and headaches. Denies appetite change, unexpected weight change, nausea/vomiting, diarrhea, tremors, and syncope. He follows .     History of Present Illness:  07/03/2017 - CT Head without contrast:  • Bilateral occipital lobe hypodensity compatible with subacute ischemic change. MRI correlation recommended.    07/03/2017 - MRI brain with and without contrast:  • Posterior reversible encephalopathy syndrome.   • Follow-up in 8 weeks is suggested.    07/08/2017 - CT head with and without contrast:  • Negative CT brain without and with contrast    08/29/2017 - MRI brain with and without contrast:  • Interval resolution of posterior cortical and subcortical FLAIR signal.  • No stroke, hemorrhage, or abnormal enhancement.  • No new or increased abnormality    04/02/2019 - MRI Brain with and without contrast:  • There is an approximately 4.5 x 4.5 x 3.5 cm focus of masslike FLAIR hyperintensity in the posterior right frontal lobe (series 401-image 23 and series 901-image 16).   • There is a second discrete FLAIR hyperintense nodule in the subcortical white matter of the paramedian posterior  right frontal lobe immediately above the corpus callosum measuring 1.1 x 1.9 x 1.5 cm (series 401-image 22 and series 901-image 16).   • Notable diffusion signal hyperintensity in this second area with a faint signal hyperintensity in the larger area.   • ADC shows areas of low signal centrally within the larger area of FLAIR abnormality as well as diffusely throughout the smaller nodular hyperintensity (series 204-images 172 and 156).     04/08/2019 - Appointment with :  • Presents from his neurology office after left facial and upper extremity focal motor seizure. MRI was read as abnormal he is here to discuss the MRI results.  In July 2017 he was hospitalized for PRES syndrome. Presented as a seizure at that time and was intubated trached with an extended hospital stay and recovery.  He did very well however and fully recovered.    • In February 2019 he started to develop left facial twitching and left upper extremity twitching.  He has been started on Keppra 500 mg twice daily for just a few days.    • He has not seen any improvement in the focal motor seizures.    • He also relates some difficulty with speech and focus and concentration.    Recommendations:  • We will get him preop and scheduled for a right stereotactic brain biopsy as soon as possible.    04/15/2019 - Craniotomy with biopsy per :  1-3.  Brain, right frontal, biopsy:  • Anaplastic Infiltrating Glioma (see comment)    04/15/2019 - CT Head without contrast:  • Status post right craniotomy with a tract of air extending to the medial right frontal lobe which is presumably related to a biopsy tract.  • Ill-defined hyperdense area in the medial right frontal lobe corresponds with the known mass seen on recent MRI exam.    05/09/2019 - Appointment with :  • Pathology is consistent with Grade 3 Anaplastic Astrocytoma.   • In my opinion this is not amenable to a maximal safe surgical resection I think any effective surgery  that would give a survival benefit would likely severely disabled him.   • I am going to insist that he get a second opinion from the [facility] we will make a referral to Drew Gutierrez.   • We will also make a referral to our radiation oncology and our local oncologist for chemotherapeutic discussions.   • I did extensive discussion with the patient and his family. Their questions and concerns were patiently addressed. I will reach out to the [facility] to discuss the case with Dr. Gutierrez and we will get him set up for radiation oncology and oncology referrals.   •  I will see him in follow-up in about 3 months with a repeat MRI of the brain    05/10/2019 - Presented to ER after falling in the bathroom earlier this morning.  Patient states he slipped and fell while getting out of the bathtub striking his back on the bathtub.  He denies syncope or chest pain before this event.  He states he just slipped and fell. He denies any other injury.     05/10/2019 - CT lumbar spine with and without contrast:  • No CT evidence of acute osseous abnormality.  • Multilevel disc degeneration and spondylosis    History obtained from  PATIENT, FAMILY and CHART    PAST MEDICAL HISTORY  Past Medical History:   Diagnosis Date   • Angio-edema 7/3/2017   • Anxiety    • Arthritis    • Cancer (CMS/HCC) 05/09/2019    Brain cancer diagnoised yesterday  Dr Trevino    • Clostridium difficile colitis    • Erectile dysfunction 10/6/2016   • GERD (gastroesophageal reflux disease)    • Gout    • HCAP (healthcare-associated pneumonia) 7/11/2017   • Hyperlipidemia    • Malignant hypertension    • MSSA (methicillin susceptible Staphylococcus aureus) infection 7/31/2017   • Obstructive sleep apnea    • Seizures (CMS/HCC) 7/4/2017      PAST SURGICAL HISTORY  Past Surgical History:   Procedure Laterality Date   • COLONOSCOPY     • CRANIOTOMY Right 4/15/2019    Procedure: CRANIOTOMY WITH BIOPSY RIGHT;  Surgeon: Uriel  Dillon RUSS MD;  Location:  [facility] OR;  Service: Neurosurgery   • SHOULDER ARTHROSCOPY Left    • TRACHEOSTOMY N/A 2017    Procedure: TRACHEOSTOMY WITH TRACHEOSCOPY;  Surgeon: Jairon Pierson MD;  Location:  [facility] OR;  Service:       FAMILY HISTORY  family history includes Diabetes in his maternal grandmother and mother; Heart attack in his paternal grandfather; Heart disease in his father; Hypertension in his father and mother; Kidney disease in his sister; No Known Problems in his daughter, maternal grandfather, paternal grandmother, and son.     SOCIAL HISTORY  Social History     Tobacco Use   • Smoking status: Former Smoker     Packs/day: 0.33     Years: 30.00     Pack years: 9.90     Types: Cigarettes     Last attempt to quit: 7/3/2017     Years since quittin.8   • Smokeless tobacco: Never Used   Substance Use Topics   • Alcohol use: Yes     Comment: used to drink alot but now just ocasional beer since has seizure in 2017   • Drug use: No     ALLERGIES  Lipitor [atorvastatin] and Lisinopril     MEDICATIONS    Current Outpatient Medications:   •  allopurinol (ZYLOPRIM) 100 MG tablet, Take 100 mg by mouth Daily., Disp: , Rfl:   •  aspirin 81 MG tablet, Take 1 tablet by mouth Daily., Disp: , Rfl:   •  colchicine 0.6 MG tablet, Take 2 tabs now, repeat 1 tab in an hour.  May repeat same steps again in 24 hours if needed.  He has chronic gout, Disp: 30 tablet, Rfl: 2  •  cyclobenzaprine (FLEXERIL) 10 MG tablet, Take 1 tablet by mouth 3 (Three) Times a Day As Needed for Muscle Spasms., Disp: 60 tablet, Rfl: 0  •  dexamethasone (DECADRON) 1 MG tablet, , Disp: , Rfl: 4  •  furosemide (LASIX) 20 MG tablet, TAKE ONE TABLET BY MOUTH DAILY (Patient taking differently: prn), Disp: 30 tablet, Rfl: 0  •  levETIRAcetam (KEPPRA) 750 MG tablet, Take 2 tablets by mouth 2 (Two) Times a Day., Disp: 120 tablet, Rfl: 2  •  losartan (COZAAR) 100 MG tablet, TAKE ONE TABLET BY MOUTH DAILY FOR HIGH BLOOD PRESSURE, Disp: 90 tablet,  Rfl: 1  •  QUEtiapine (SEROquel) 100 MG tablet, Take 1 tablet by mouth every night at bedtime., Disp: 90 tablet, Rfl: 0  •  spironolactone (ALDACTONE) 25 MG tablet, TAKE ONE TABLET BY MOUTH DAILY, Disp: 90 tablet, Rfl: 1  •  vitamin B-6 (PYRIDOXINE) 100 MG tablet, Take 1 tablet by mouth Daily., Disp: 30 tablet, Rfl: 3  •  ALPRAZolam (XANAX) 0.25 MG tablet, Take 1 tablet by mouth 2 (Two) Times a Day As Needed for Anxiety., Disp: 60 tablet, Rfl: 0  •  carvedilol (COREG) 25 MG tablet, Take 1 tablet by mouth 2 (Two) Times a Day With Meals., Disp: 180 tablet, Rfl: 1  •  CloNIDine (CATAPRES) 0.1 MG tablet, Take 1 tablet by mouth Every Night. Check BP 4x daily. If> 180 SBP take x1., Disp: 60 tablet, Rfl: 2  •  divalproex (DEPAKOTE) 500 MG DR tablet, Take 1 tablet by mouth 2 (Two) Times a Day., Disp: 120 tablet, Rfl: 3  •  fenofibrate (TRICOR) 145 MG tablet, Take 1 tablet by mouth Daily., Disp: 90 tablet, Rfl: 1  •  levETIRAcetam (KEPPRA) 500 MG tablet, , Disp: , Rfl: 3  •  Omega-3 Fatty Acids (FISH OIL) 1000 MG capsule capsule, Take 2 capsules by mouth Daily With Breakfast., Disp: 180 capsule, Rfl: 1  •  omeprazole (PRILOSEC) 10 MG capsule, Take 1 capsule by mouth Daily. For acid reflux, Disp: 90 capsule, Rfl: 1  •  oxyCODONE-acetaminophen (PERCOCET)  MG per tablet, Take 1 tablet by mouth Every 4 (Four) Hours As Needed for Moderate Pain ., Disp: 60 tablet, Rfl: 0  •  venlafaxine XR (EFFEXOR-XR) 150 MG 24 hr capsule, Take 1 capsule by mouth Daily., Disp: 90 capsule, Rfl: 1    The following portions of the patient's history were reviewed and updated as appropriate: allergies, current medications, past family history, past medical history, past social history, past surgical history and problem list.    REVIEW OF SYSTEMS  Review of Systems   Constitutional: Positive for activity change and fatigue. Negative for appetite change, chills, diaphoresis, fever and unexpected weight change.   Eyes: Negative.    Respiratory:  "Negative.    Cardiovascular: Negative.    Gastrointestinal: Negative.         Omeprazole   Endocrine: Negative.    Genitourinary: Negative.    Musculoskeletal: Negative.         Back pain  Had Percocet from ER but out now   Skin: Negative.    Allergic/Immunologic: Negative.    Neurological: Positive for dizziness, seizures, speech difficulty, weakness, light-headedness and headaches. Negative for tremors and syncope.   Hematological: Negative.    Psychiatric/Behavioral:        Depression/ anxiety  Had PRES in 2016   Depression/anxiety associated with this     PHYSICAL EXAM  VITAL SIGNS:   Vitals:    05/17/19 1045   BP: 110/66   Weight: 88.9 kg (196 lb)   Height: 172.7 cm (68\")   PainSc:   2   PainLoc: Head     General:  Alert and oriented, no acute distress, well developed, Vitals reviewed.  Head/Neck:  Normocephalic, without obvious abnormality, atraumatic.  The trachea is midline.   Nose/Sinuses:  Nares normal. Septum midline. Mucosa normal. No drainage or sinus tenderness.  Mouth/Throat:  Mucosa moist, no lesions; pharynx without erythema, edema or exudate.  Neck:  supple, no mass, non-tender  Eyes: No gross abnormalities,  no scleral jaundice or erythema.    Ears: Ears appear intact with no abnormalities noted, hearing grossly normal  Chest:  Respiratory efforts are normal and unlabored, chest is clear to auscultation. There are no wheezes, rhonchi, rales.  Cardiovascular: Regular rate and rhythm without murmurs, rubs, or gallops.   Abdomen:  Soft, non-tender, normal bowel sounds; no noted organomegaly or masses. Very tender left and right flank area due to recent fall.  Extremities:  ROME well, warm to touch, no evidence of cyanosis or edema.  Lymphatics:  No evidence of adenopathy in the cervical or supraclavicular areas.  Skin: No suspicious lesions or rashes of concern, skin color, texture, turgor are normal  Neurologic:  Alert and oriented, multiple rapid seizures noted during our office visit  Psych: Mood " and affect are appropriate for circumstance. Eye contact is appropriate.     Performance Status: ECOG (0) Fully active, able to carry on all predisease performance without restriction    Clinical Quality Measures  -Pain Documented by Standardized Tool, FPS Pain Score: 2  Pain severity quantified; pain present, followup plan documented.Plan: Pain medication per other physicians.   -Advanced Care Planning Care plan discussed, no care plan provided  -Body Mass Index Screening and Follow-Up Plan Patient's Body mass index is 29.8 kg/m². BMI is above normal parameters. Recommendations include: educational material.  -Tobacco Use: Screening and Cessation Intervention Social History    Tobacco Use      Smoking status: Former Smoker        Packs/day: 0.33        Years: 30.00        Pack years: 9.9        Types: Cigarettes        Quit date: 7/3/2017        Years since quittin.8      Smokeless tobacco: Never Used    ASSESSMENT AND PLAN  1. Astrocytoma brain tumor (CMS/HCC)    2. Seizures (CMS/HCC)    3. Former smoker    4. Encounter for consultation        RECOMMENDATIONS: Lukasz Savage is a very pleasant male that has been referred to our office by Dillon Trevino MD to discuss radiotherapy options for Grade 3 Anaplastic Astrocytoma, he is here today with his daughter and his mother.  Mr. Savage is alert and oriented and verbally responds appropriately although multiple rapid seizures noted during our office visit. His medication was recently increased per his family.    We have dicussed the indications and rationale of radiation therapy according to the NCCN Guidelines to the brain for Grade 3 Anaplastic Astrocytoma with the patient and family today. I have extensively reviewed the risks, benefits and alternatives of therapy. We discussed the risks and benefits of stereotactic radiosurgery vs whole brain radiation. The risks include headaches, hair loss in the area treated, nausea, vomiting, fatigue, hearing loss,  skin and scalp changes, trouble with memory and speech, seizures and progression of disease in spite of therapy with either local or systemic failure.      The option of definitive surgery was discussed by Dr. Trevino as well as [facility] neurosurgery, has been agreed upon that this lesion is not amenable to a maximal safe surgical resection and would likely severely disabled him.  He is scheduled to see medical oncologist, Dr. Doyle on Friday the 24th for evaluation.    I have seen, examined and reviewed this patient's medication list, appropriate labs and imaging studies.  I reviewed relevant medical records and other physicians notes.  I discussed the goals and plans of care with the patient and family and answered all questions.     The patient and his family verbalize understanding of this discussion, voice no further questions and wish to proceed with recommended therapy of combined modality Temodar and radiation therapy for definitive treatment, anticipate a course of 6008-2675 cGy, final treatment plan to be determined    We will perform CT simulation on May 20th (Monday) to initiate the treatment planning with the intent to begin treatments as soon as possible. Thank you for allowing me to assist in this patients care.    Today's appointment time was spent in counseling and coordination of care as follows: diagnosis, intent of treatment, discussing radiation therapy specifics: logistics, possible and probable side effects and after effects, staging of cancer, standard of care for this stage of this cancer and treatment options  Oral Jessica III, MD  05/17/2019

## 2019-05-17 ENCOUNTER — HOSPITAL ENCOUNTER (OUTPATIENT)
Dept: RADIATION ONCOLOGY | Facility: HOSPITAL | Age: 54
Setting detail: RADIATION/ONCOLOGY SERIES
End: 2019-05-17

## 2019-05-17 ENCOUNTER — CONSULT (OUTPATIENT)
Dept: RADIATION ONCOLOGY | Facility: HOSPITAL | Age: 54
End: 2019-05-17

## 2019-05-17 VITALS
BODY MASS INDEX: 29.7 KG/M2 | DIASTOLIC BLOOD PRESSURE: 66 MMHG | HEIGHT: 68 IN | WEIGHT: 196 LBS | SYSTOLIC BLOOD PRESSURE: 110 MMHG

## 2019-05-17 DIAGNOSIS — R56.9 SEIZURES (HCC): ICD-10-CM

## 2019-05-17 DIAGNOSIS — C71.9 ASTROCYTOMA BRAIN TUMOR (HCC): Primary | ICD-10-CM

## 2019-05-17 DIAGNOSIS — Z71.9 ENCOUNTER FOR CONSULTATION: ICD-10-CM

## 2019-05-17 DIAGNOSIS — Z87.891 FORMER SMOKER: ICD-10-CM

## 2019-05-17 DIAGNOSIS — R56.9 SEIZURES (HCC): Primary | ICD-10-CM

## 2019-05-17 PROCEDURE — G0463 HOSPITAL OUTPT CLINIC VISIT: HCPCS | Performed by: RADIOLOGY

## 2019-05-17 RX ORDER — ESLICARBAZEPINE ACETATE 800 MG/1
800 TABLET ORAL DAILY
Qty: 60 TABLET | Refills: 3 | Status: CANCELLED | OUTPATIENT
Start: 2019-05-17

## 2019-05-17 RX ORDER — OXYCODONE AND ACETAMINOPHEN 10; 325 MG/1; MG/1
1 TABLET ORAL EVERY 4 HOURS PRN
Qty: 60 TABLET | Refills: 0 | Status: ON HOLD | OUTPATIENT
Start: 2019-05-17 | End: 2019-06-14

## 2019-05-17 RX ORDER — DIVALPROEX SODIUM 500 MG/1
500 TABLET, DELAYED RELEASE ORAL 2 TIMES DAILY
Qty: 120 TABLET | Refills: 3 | Status: SHIPPED | OUTPATIENT
Start: 2019-05-17 | End: 2019-05-31 | Stop reason: SDUPTHER

## 2019-05-17 RX ORDER — CYCLOBENZAPRINE HCL 10 MG
10 TABLET ORAL 3 TIMES DAILY PRN
Qty: 60 TABLET | Refills: 0 | Status: SHIPPED | OUTPATIENT
Start: 2019-05-17 | End: 2019-06-20 | Stop reason: HOSPADM

## 2019-05-20 ENCOUNTER — OFFICE VISIT (OUTPATIENT)
Dept: FAMILY MEDICINE CLINIC | Facility: CLINIC | Age: 54
End: 2019-05-20

## 2019-05-20 ENCOUNTER — HOSPITAL ENCOUNTER (OUTPATIENT)
Dept: RADIATION ONCOLOGY | Facility: HOSPITAL | Age: 54
Discharge: HOME | End: 2019-05-20

## 2019-05-20 VITALS
BODY MASS INDEX: 29.64 KG/M2 | WEIGHT: 195.6 LBS | DIASTOLIC BLOOD PRESSURE: 94 MMHG | RESPIRATION RATE: 18 BRPM | SYSTOLIC BLOOD PRESSURE: 145 MMHG | OXYGEN SATURATION: 99 % | HEART RATE: 73 BPM | HEIGHT: 68 IN | TEMPERATURE: 98.2 F

## 2019-05-20 DIAGNOSIS — E87.6 HYPOKALEMIA: ICD-10-CM

## 2019-05-20 DIAGNOSIS — I10 ESSENTIAL HYPERTENSION: Primary | ICD-10-CM

## 2019-05-20 DIAGNOSIS — K21.9 GASTROESOPHAGEAL REFLUX DISEASE WITHOUT ESOPHAGITIS: ICD-10-CM

## 2019-05-20 DIAGNOSIS — R89.9 ABNORMAL LABORATORY TEST: ICD-10-CM

## 2019-05-20 DIAGNOSIS — E78.2 MIXED HYPERLIPIDEMIA: ICD-10-CM

## 2019-05-20 DIAGNOSIS — F33.1 MODERATE EPISODE OF RECURRENT MAJOR DEPRESSIVE DISORDER (HCC): ICD-10-CM

## 2019-05-20 DIAGNOSIS — F41.8 ANXIETY WITH DEPRESSION: ICD-10-CM

## 2019-05-20 DIAGNOSIS — E83.42 HYPOMAGNESEMIA: ICD-10-CM

## 2019-05-20 PROCEDURE — 99214 OFFICE O/P EST MOD 30 MIN: CPT | Performed by: NURSE PRACTITIONER

## 2019-05-20 PROCEDURE — 77334 RADIATION TREATMENT AID(S): CPT | Performed by: RADIOLOGY

## 2019-05-20 RX ORDER — CARVEDILOL 25 MG/1
25 TABLET ORAL 2 TIMES DAILY WITH MEALS
Qty: 180 TABLET | Refills: 1 | Status: SHIPPED | OUTPATIENT
Start: 2019-05-20 | End: 2019-06-20 | Stop reason: HOSPADM

## 2019-05-20 RX ORDER — CLONIDINE HYDROCHLORIDE 0.1 MG/1
0.1 TABLET ORAL NIGHTLY
Qty: 60 TABLET | Refills: 2 | Status: SHIPPED | OUTPATIENT
Start: 2019-05-20 | End: 2019-06-20 | Stop reason: HOSPADM

## 2019-05-20 RX ORDER — ALPRAZOLAM 0.25 MG/1
0.25 TABLET ORAL 2 TIMES DAILY PRN
Qty: 60 TABLET | Refills: 0 | Status: SHIPPED | OUTPATIENT
Start: 2019-05-20 | End: 2020-01-06 | Stop reason: SDUPTHER

## 2019-05-20 RX ORDER — FENOFIBRATE 145 MG/1
145 TABLET, COATED ORAL DAILY
Qty: 90 TABLET | Refills: 1 | Status: SHIPPED | OUTPATIENT
Start: 2019-05-20 | End: 2019-07-19 | Stop reason: HOSPADM

## 2019-05-20 RX ORDER — VENLAFAXINE HYDROCHLORIDE 150 MG/1
150 CAPSULE, EXTENDED RELEASE ORAL DAILY
Qty: 90 CAPSULE | Refills: 1 | Status: SHIPPED | OUTPATIENT
Start: 2019-05-20 | End: 2019-12-10 | Stop reason: SDUPTHER

## 2019-05-20 RX ORDER — CHLORAL HYDRATE 500 MG
2000 CAPSULE ORAL
Qty: 180 CAPSULE | Refills: 1 | Status: SHIPPED | OUTPATIENT
Start: 2019-05-20 | End: 2019-11-04 | Stop reason: SDUPTHER

## 2019-05-20 RX ORDER — OMEPRAZOLE 10 MG/1
10 CAPSULE, DELAYED RELEASE ORAL DAILY
Qty: 90 CAPSULE | Refills: 1 | Status: SHIPPED | OUTPATIENT
Start: 2019-05-20 | End: 2019-07-22 | Stop reason: SDUPTHER

## 2019-05-20 NOTE — PROGRESS NOTES
Chief Complaint   Patient presents with   • Brain Tumor     Pt is here for followup for brain tumor.          Subjective    Lukasz Savage is a 54 y.o. male here for follow up for brain tumor and s/p craniotomy follow up for Grade 3 Anaplastic Astrocytoma, not amendable to resection. (followed by Dr. Trevino of neurosurgery). Pathology is pending.  Lukasz and his mother are here today for follow up, he is now walking with a cane,  Admits to having problems with activity change, fatigue, back pain, dizziness, speech difficulty, weakness, seizures, and headaches. He went for his referral in [place] and says that he is going to do treatments here.  I asked him if he wanted to be referred to [facility], [facility], [facility], or [facility].  He says that there would be too much travel and cost associated with it and he is confidence in Dr. Hernandez. He had slipped and fell in the bathroom getting out of tub and struck his back on the tub 5/9/19 and he says he went to ER for evaluation that night.   See below for past CTS.  He says he is not drinking and last time he drank was 7/2017.  He says when he gets a bad headache he gets weakness on left side of face and numbness on the left side of body.  It improves once the headache is gone.  Mother says that he is having small seizures sometimes 2 times a day.  None today.  He says that he is really struggling with anxiety, says he lays and thinks about all of this.  Wants to know if he can have something to calm his nerves.  Denies suicidal or homicidal ideations.   Blood pressure is mildly elevated today, however, he says he was really frustrated because he was late for appt, and is tired of everyone asking him how he is. Says that his blood pressure is normal at home and sometimes he has dizziness and it is lower.                                        History of Present Illness:  07/03/2017 - CT Head without contrast:  · Bilateral occipital lobe hypodensity compatible  with subacute ischemic change. MRI correlation recommended.     07/03/2017 - MRI brain with and without contrast:  · Posterior reversible encephalopathy syndrome.   · Follow-up in 8 weeks is suggested.     07/08/2017 - CT head with and without contrast:  · Negative CT brain without and with contrast     08/29/2017 - MRI brain with and without contrast:  · Interval resolution of posterior cortical and subcortical FLAIR signal.  · No stroke, hemorrhage, or abnormal enhancement.  · No new or increased abnormality    The following portions of the patient's history were reviewed and updated as appropriate: allergies, current medications, past family history, past medical history, past social history, past surgical history and problem list.      Current Outpatient Medications:   •  allopurinol (ZYLOPRIM) 100 MG tablet, Take 100 mg by mouth Daily., Disp: , Rfl:   •  aspirin 81 MG tablet, Take 1 tablet by mouth Daily., Disp: , Rfl:   •  carvedilol (COREG) 25 MG tablet, Take 1 tablet by mouth 2 (Two) Times a Day With Meals., Disp: 180 tablet, Rfl: 1  •  CloNIDine (CATAPRES) 0.1 MG tablet, Take 1 tablet by mouth Every Night. Check BP 4x daily. If> 180 SBP take x1., Disp: 60 tablet, Rfl: 2  •  colchicine 0.6 MG tablet, Take 2 tabs now, repeat 1 tab in an hour.  May repeat same steps again in 24 hours if needed.  He has chronic gout, Disp: 30 tablet, Rfl: 2  •  cyclobenzaprine (FLEXERIL) 10 MG tablet, Take 1 tablet by mouth 3 (Three) Times a Day As Needed for Muscle Spasms., Disp: 60 tablet, Rfl: 0  •  dexamethasone (DECADRON) 1 MG tablet, , Disp: , Rfl: 4  •  divalproex (DEPAKOTE) 500 MG DR tablet, Take 1 tablet by mouth 2 (Two) Times a Day., Disp: 120 tablet, Rfl: 3  •  fenofibrate (TRICOR) 145 MG tablet, Take 1 tablet by mouth Daily., Disp: 90 tablet, Rfl: 1  •  furosemide (LASIX) 20 MG tablet, TAKE ONE TABLET BY MOUTH DAILY (Patient taking differently: prn), Disp: 30 tablet, Rfl: 0  •  levETIRAcetam (KEPPRA) 500 MG tablet,  , Disp: , Rfl: 3  •  levETIRAcetam (KEPPRA) 750 MG tablet, Take 2 tablets by mouth 2 (Two) Times a Day., Disp: 120 tablet, Rfl: 2  •  losartan (COZAAR) 100 MG tablet, TAKE ONE TABLET BY MOUTH DAILY FOR HIGH BLOOD PRESSURE, Disp: 90 tablet, Rfl: 1  •  Omega-3 Fatty Acids (FISH OIL) 1000 MG capsule capsule, Take 2 capsules by mouth Daily With Breakfast., Disp: 180 capsule, Rfl: 1  •  omeprazole (PRILOSEC) 10 MG capsule, Take 1 capsule by mouth Daily. For acid reflux, Disp: 90 capsule, Rfl: 1  •  oxyCODONE-acetaminophen (PERCOCET)  MG per tablet, Take 1 tablet by mouth Every 4 (Four) Hours As Needed for Moderate Pain ., Disp: 60 tablet, Rfl: 0  •  QUEtiapine (SEROquel) 100 MG tablet, Take 1 tablet by mouth every night at bedtime., Disp: 90 tablet, Rfl: 0  •  spironolactone (ALDACTONE) 25 MG tablet, TAKE ONE TABLET BY MOUTH DAILY, Disp: 90 tablet, Rfl: 1  •  venlafaxine XR (EFFEXOR-XR) 150 MG 24 hr capsule, Take 1 capsule by mouth Daily., Disp: 90 capsule, Rfl: 1  •  vitamin B-6 (PYRIDOXINE) 100 MG tablet, Take 1 tablet by mouth Daily., Disp: 30 tablet, Rfl: 3  Allergies   Allergen Reactions   • Lipitor [Atorvastatin] Rash   • Lisinopril Angioedema     Swell tongue     Past Medical History:   Diagnosis Date   • Angio-edema 7/3/2017   • Anxiety    • Arthritis    • Cancer (CMS/HCC) 05/09/2019    Brain cancer diagnoised yesterday  Dr Trevino    • Clostridium difficile colitis    • Erectile dysfunction 10/6/2016   • GERD (gastroesophageal reflux disease)    • Gout    • HCAP (healthcare-associated pneumonia) 7/11/2017   • Hyperlipidemia    • Malignant hypertension    • MSSA (methicillin susceptible Staphylococcus aureus) infection 7/31/2017   • Obstructive sleep apnea    • Seizures (CMS/HCC) 7/4/2017     Past Surgical History:   Procedure Laterality Date   • COLONOSCOPY     • CRANIOTOMY Right 4/15/2019    Procedure: CRANIOTOMY WITH BIOPSY RIGHT;  Surgeon: Dillon Trevino MD;  Location: [facility] OR;  Service:  "Neurosurgery   • SHOULDER ARTHROSCOPY Left    • TRACHEOSTOMY N/A 7/12/2017    Procedure: TRACHEOSTOMY WITH TRACHEOSCOPY;  Surgeon: Jairon Pierson MD;  Location: [facility];  Service:      Family History   Problem Relation Age of Onset   • Hypertension Mother    • Diabetes Mother    • Heart disease Father    • Hypertension Father    • No Known Problems Daughter    • No Known Problems Son    • Diabetes Maternal Grandmother    • No Known Problems Maternal Grandfather    • No Known Problems Paternal Grandmother    • Heart attack Paternal Grandfather    • Kidney disease Sister        REVIEW OF SYMPTOMS: (Positives bolded)  General:  weight loss, fever, chills, night sweats, fatigue, appetite loss  HEENT:  blurry vision, eye pain, eye discharge, dry eyes, decreased vision  Respiratory: shortness of breath, cough, hemoptysis, wheezing, pleurisy,   Cardiovascular:  chest pain, PND, palpitation, edema, orthopnea, syncope  Gastro: Nausea, vomiting, diarrhea, hematemesis, abdominal pain, constipation  Genito: hematuria, dysuria, glycosuria, hesitancy, frequency, incontinence  Musckelo: Arthralgia, myalgia, muscle weakness, joint swelling, NSAID use  Skin: rash, pruritis, sores, nail changes, skin thickening, change in wart/mole, itching   Neuro:  Migraine, numbness, ataxia, tremor, vertigo, weakness, memory loss, headache.  \"All other systems reviewed and negative, except as listed above.”      OBJECTIVE:  Constitutional:  No acute distress, Consistent with stated age. Oriented x 3, alert Posture. Patient is pleasant and cooperative with the interview and exam. Gait is steady with cane    Integumentary: No rashes, ulcers or lesions. No edema.  Normal skin moisture/turgor. Skin is warm to touch, no increased warmth. Capillary refill is normal bilateral Upper and lower extremity.     Eye: Bilaterally PERRLA, EOMI.  No discharge.  Upper and lower eyelids are normal. Sclera/conjunctiva normal without discharge. Cornea is normal " "and clear. Lens is normal.  Eyeball appears normal. No ciliary flushing, no conjunctival injection.    ENMT:  Canals  normal without erythema or discharge, no excessive cerumen. Tympanic membranes Grey/pearly, normal light reflex and anatomy. Hearing normal to conversational speech at 2-5 feet. Nares airflow, no discharge. Dentition assessed and discussed appropriate oral care. Tongue normal midline.  no pharyngeal erythema, Uvula midline. No post nasal drip. Healing area from crainotomy    CHEST/LUNG:Lungs clear throughout lung fields without rale, rhonchi or wheezes.  Normal effort, no distress, no use of accessory muscles. nontender sternum, ribline.  No abnormal pulsations.      CARDIOVASCULAR:  Regular rate and rhythm. No murmur noted in sitting, supine positions. digital clubbing, cyanosis, edema, increased warmth.  normal, no bruits or abnormal pulsations.      ABDOMEN: normal and no visible pulsations. Normal contour. Bowel sounds normal, no abdominal bruits. Abd soft, non-tender, no rebound tenderness, no rigidity (guarding), no jar tenderness, no masses.  no hepatomegaly, no splenomegaly     Neuro & Neuropsych: Normal speech, more difficult to understand then usual.  Thought- normal with ability to perform basic computations and apply abstract thought/reason. No hallucinations, delusions, obsessions.   Appropriate judgement and memory.  CN II - 12 intact except for left facial weakness and facial asymmetry.          /94 (BP Location: Left arm, Patient Position: Sitting, Cuff Size: Adult)   Pulse 73   Temp 98.2 °F (36.8 °C) (Oral)   Resp 18   Ht 172.7 cm (67.99\")   Wt 88.7 kg (195 lb 9.6 oz)   SpO2 99%   BMI 29.75 kg/m²     ASSESSMENT  Problems Addressed this Visit        Unprioritized    HTN (hypertension) - Primary    Relevant Medications    carvedilol (COREG) 25 MG tablet    CloNIDine (CATAPRES) 0.1 MG tablet    Hyperlipidemia    Relevant Medications    fenofibrate (TRICOR) 145 MG tablet    " Omega-3 Fatty Acids (FISH OIL) 1000 MG capsule capsule      Other Visit Diagnoses     Anxiety with depression        Moderate episode of recurrent major depressive disorder (CMS/[facility])        Relevant Medications    ALPRAZolam (XANAX) 0.25 MG tablet    venlafaxine XR (EFFEXOR-XR) 150 MG 24 hr capsule    Hypokalemia        Relevant Medications    ALPRAZolam (XANAX) 0.25 MG tablet    Other Relevant Orders    Comprehensive metabolic panel (Completed)    ToxASSURE Select 13 Discrete -    Hypomagnesemia        Relevant Orders    Magnesium (Completed)    Abnormal laboratory test        Relevant Medications    fenofibrate (TRICOR) 145 MG tablet    Gastroesophageal reflux disease without esophagitis        Relevant Medications    omeprazole (PRILOSEC) 10 MG capsule            -        [facility] Report:     As part of this patient's treatment plan, I am prescribing controlled substances. The patient has been made aware of appropriate use of such medications, including potential risk of opiod use, somnolence, limited ability to drive and /or work safely, and potential for dependence or overdose, and opiods should be used sparingly and are not recommended for long term use.  It has also been made clear that these medications are for use by this patient only, without concomitant use of alcohol or other substances unless prescribed.     Patient has completed prescribing agreement detailing terms of continued prescribing of controlled substances, including monitoring [facility] reports, urine drug screening yearly an random drug screens to monitor patients compliance to treatment plan, and pill counts if necessary. The patient is aware that inappropriate use will result in cessation of prescribing such medications.    Toxassure:  I have ordered a urine drug screen to monitor patients predisposition to and patterns of drug use/misuse in order to establish and maintain the safe and effective use of analgesics in the treatment of chronic  pain      [facility] report has been reviewed by: Linda Hoffman DNP, APRN.  The report was scanned into the patient's chart.      -     Pt is aware of the potential for addiction and dependence.    Addiction was discussed.  The  Risks, benefits and alternative options of drug therapy discussed.  It was reinforced about the risks and benefits of opioids.  It was reinforced that patient may not call early for medication, may not ask for increase in the number of pills given monthly or increase in dosage of medication, may not jump around to different pharmacies or providers and may not receive any narcotics from other providers including ER, or the contract will be broken and narcotics will no longer be prescribed from this facility if any of these criteria has been broken.Last Dose was: he is prescribed oxycodone by [facility], does not have anything for nerves    Last Fill was:  new  Date of last KVNG: today    Does take oxycodone for pain          Return in about 1 month (around 6/20/2019) for Recheck brain tumor, htn, hyperlipidemia.    Linda Hoffman DNP, APRN-BC   05/20/2019

## 2019-05-21 LAB
ALBUMIN SERPL-MCNC: 3.7 G/DL (ref 3.5–5.2)
ALBUMIN/GLOB SERPL: 1.2 G/DL
ALP SERPL-CCNC: 60 U/L (ref 39–117)
ALT SERPL-CCNC: 25 U/L (ref 1–41)
AST SERPL-CCNC: 30 U/L (ref 1–40)
BILIRUB SERPL-MCNC: 0.3 MG/DL (ref 0.2–1.2)
BUN SERPL-MCNC: 28 MG/DL (ref 6–20)
BUN/CREAT SERPL: 18.4 (ref 7–25)
CALCIUM SERPL-MCNC: 9.9 MG/DL (ref 8.6–10.5)
CHLORIDE SERPL-SCNC: 107 MMOL/L (ref 98–107)
CO2 SERPL-SCNC: 20.4 MMOL/L (ref 22–29)
CREAT SERPL-MCNC: 1.52 MG/DL (ref 0.76–1.27)
GLOBULIN SER CALC-MCNC: 3 GM/DL
GLUCOSE SERPL-MCNC: 104 MG/DL (ref 65–99)
MAGNESIUM SERPL-MCNC: 2.3 MG/DL (ref 1.6–2.6)
POTASSIUM SERPL-SCNC: 4.5 MMOL/L (ref 3.5–5.2)
PROT SERPL-MCNC: 6.7 G/DL (ref 6–8.5)
SODIUM SERPL-SCNC: 141 MMOL/L (ref 136–145)

## 2019-05-28 PROCEDURE — 77334 RADIATION TREATMENT AID(S): CPT | Performed by: RADIOLOGY

## 2019-05-28 PROCEDURE — 77470 SPECIAL RADIATION TREATMENT: CPT | Performed by: RADIOLOGY

## 2019-05-28 PROCEDURE — 77295 3-D RADIOTHERAPY PLAN: CPT | Performed by: RADIOLOGY

## 2019-05-28 PROCEDURE — 77300 RADIATION THERAPY DOSE PLAN: CPT | Performed by: RADIOLOGY

## 2019-05-29 DIAGNOSIS — I10 ESSENTIAL HYPERTENSION: ICD-10-CM

## 2019-05-29 DIAGNOSIS — F33.1 MODERATE EPISODE OF RECURRENT MAJOR DEPRESSIVE DISORDER (HCC): ICD-10-CM

## 2019-05-29 DIAGNOSIS — N28.9 ABNORMAL KIDNEY FUNCTION: Primary | ICD-10-CM

## 2019-05-29 DIAGNOSIS — K21.9 GASTROESOPHAGEAL REFLUX DISEASE WITHOUT ESOPHAGITIS: ICD-10-CM

## 2019-05-29 LAB
CYTO UR: NORMAL
LAB AP CASE REPORT: NORMAL
LAB AP DIAGNOSIS COMMENT: NORMAL
Lab: NORMAL
PATH REPORT.FINAL DX SPEC: NORMAL
PATH REPORT.GROSS SPEC: NORMAL

## 2019-05-29 RX ORDER — CARVEDILOL 25 MG/1
TABLET ORAL
Qty: 180 TABLET | Refills: 1 | OUTPATIENT
Start: 2019-05-29

## 2019-05-29 RX ORDER — VENLAFAXINE HYDROCHLORIDE 150 MG/1
CAPSULE, EXTENDED RELEASE ORAL
Qty: 90 CAPSULE | Refills: 1 | OUTPATIENT
Start: 2019-05-29

## 2019-05-29 RX ORDER — OMEPRAZOLE 10 MG/1
CAPSULE, DELAYED RELEASE ORAL
Qty: 90 CAPSULE | Refills: 1 | OUTPATIENT
Start: 2019-05-29

## 2019-05-31 ENCOUNTER — TELEPHONE (OUTPATIENT)
Dept: NEUROLOGY | Facility: CLINIC | Age: 54
End: 2019-05-31

## 2019-05-31 DIAGNOSIS — R56.9 SEIZURES (HCC): Primary | ICD-10-CM

## 2019-05-31 DIAGNOSIS — C71.9 ASTROCYTOMA BRAIN TUMOR (HCC): ICD-10-CM

## 2019-05-31 RX ORDER — DIVALPROEX SODIUM 500 MG/1
1000 TABLET, DELAYED RELEASE ORAL 2 TIMES DAILY
Qty: 120 TABLET | Refills: 3 | Status: ON HOLD | OUTPATIENT
Start: 2019-05-31 | End: 2019-06-14

## 2019-05-31 NOTE — TELEPHONE ENCOUNTER
Ivonne said Lukasz is having more seizures and they are more severe.  Notified Galen wants him to increase Depakote to 2 BID.  She is to call on Monday with a progress report.

## 2019-06-03 ENCOUNTER — APPOINTMENT (OUTPATIENT)
Dept: CT IMAGING | Facility: HOSPITAL | Age: 54
End: 2019-06-03

## 2019-06-03 ENCOUNTER — APPOINTMENT (OUTPATIENT)
Dept: MRI IMAGING | Facility: HOSPITAL | Age: 54
End: 2019-06-03

## 2019-06-03 ENCOUNTER — HOSPITAL ENCOUNTER (INPATIENT)
Facility: HOSPITAL | Age: 54
LOS: 3 days | Discharge: HOME | End: 2019-06-06
Attending: EMERGENCY MEDICINE | Admitting: NEUROLOGICAL SURGERY

## 2019-06-03 ENCOUNTER — TELEPHONE (OUTPATIENT)
Dept: NEUROLOGY | Facility: CLINIC | Age: 54
End: 2019-06-03

## 2019-06-03 DIAGNOSIS — G47.33 OBSTRUCTIVE SLEEP APNEA: ICD-10-CM

## 2019-06-03 DIAGNOSIS — D33.2 BENIGN NEOPLASM OF BRAIN, UNSPECIFIED BRAIN REGION (HCC): Primary | ICD-10-CM

## 2019-06-03 DIAGNOSIS — R47.81 SLURRED SPEECH: ICD-10-CM

## 2019-06-03 DIAGNOSIS — R29.898 LEFT ARM WEAKNESS: ICD-10-CM

## 2019-06-03 DIAGNOSIS — R56.9 SEIZURES (HCC): ICD-10-CM

## 2019-06-03 DIAGNOSIS — R56.9 SEIZURE (HCC): ICD-10-CM

## 2019-06-03 DIAGNOSIS — Z78.9 DECREASED ACTIVITIES OF DAILY LIVING (ADL): ICD-10-CM

## 2019-06-03 LAB
ALBUMIN SERPL-MCNC: 4.1 G/DL (ref 3.5–5)
ALBUMIN/GLOB SERPL: 1.3 G/DL (ref 1.1–2.5)
ALP SERPL-CCNC: 66 U/L (ref 24–120)
ALT SERPL W P-5'-P-CCNC: 26 U/L (ref 0–54)
ANION GAP SERPL CALCULATED.3IONS-SCNC: 10 MMOL/L (ref 4–13)
ANION GAP SERPL CALCULATED.3IONS-SCNC: 11 MMOL/L (ref 4–13)
AST SERPL-CCNC: 26 U/L (ref 7–45)
BASOPHILS # BLD AUTO: 0.03 10*3/MM3 (ref 0–0.2)
BASOPHILS # BLD AUTO: 0.03 10*3/MM3 (ref 0–0.2)
BASOPHILS NFR BLD AUTO: 0.3 % (ref 0–2)
BASOPHILS NFR BLD AUTO: 0.3 % (ref 0–2)
BILIRUB SERPL-MCNC: 0.4 MG/DL (ref 0.1–1)
BUN BLD-MCNC: 34 MG/DL (ref 5–21)
BUN BLD-MCNC: 35 MG/DL (ref 5–21)
BUN/CREAT SERPL: 22.5 (ref 7–25)
BUN/CREAT SERPL: 28.7 (ref 7–25)
CALCIUM SPEC-SCNC: 9.8 MG/DL (ref 8.4–10.4)
CALCIUM SPEC-SCNC: 9.9 MG/DL (ref 8.4–10.4)
CHLORIDE SERPL-SCNC: 109 MMOL/L (ref 98–110)
CHLORIDE SERPL-SCNC: 114 MMOL/L (ref 98–110)
CO2 SERPL-SCNC: 19 MMOL/L (ref 24–31)
CO2 SERPL-SCNC: 23 MMOL/L (ref 24–31)
CREAT BLD-MCNC: 1.22 MG/DL (ref 0.5–1.4)
CREAT BLD-MCNC: 1.51 MG/DL (ref 0.5–1.4)
DEPRECATED RDW RBC AUTO: 46 FL (ref 40–54)
DEPRECATED RDW RBC AUTO: 47.6 FL (ref 40–54)
EOSINOPHIL # BLD AUTO: 0.02 10*3/MM3 (ref 0–0.7)
EOSINOPHIL # BLD AUTO: 0.02 10*3/MM3 (ref 0–0.7)
EOSINOPHIL NFR BLD AUTO: 0.2 % (ref 0–4)
EOSINOPHIL NFR BLD AUTO: 0.2 % (ref 0–4)
ERYTHROCYTE [DISTWIDTH] IN BLOOD BY AUTOMATED COUNT: 15.1 % (ref 12–15)
ERYTHROCYTE [DISTWIDTH] IN BLOOD BY AUTOMATED COUNT: 15.3 % (ref 12–15)
ETHANOL UR QL: <0.01 %
GFR SERPL CREATININE-BSD FRML MDRD: 49 ML/MIN/1.73
GFR SERPL CREATININE-BSD FRML MDRD: 62 ML/MIN/1.73
GLOBULIN UR ELPH-MCNC: 3.1 GM/DL
GLUCOSE BLD-MCNC: 101 MG/DL (ref 70–100)
GLUCOSE BLD-MCNC: 103 MG/DL (ref 70–100)
HCT VFR BLD AUTO: 41.8 % (ref 40–52)
HCT VFR BLD AUTO: 42.4 % (ref 40–52)
HGB BLD-MCNC: 14.3 G/DL (ref 14–18)
HGB BLD-MCNC: 14.5 G/DL (ref 14–18)
IMM GRANULOCYTES # BLD AUTO: 0.05 10*3/MM3 (ref 0–0.05)
IMM GRANULOCYTES # BLD AUTO: 0.06 10*3/MM3 (ref 0–0.05)
IMM GRANULOCYTES NFR BLD AUTO: 0.5 % (ref 0–5)
IMM GRANULOCYTES NFR BLD AUTO: 0.5 % (ref 0–5)
INR PPP: 0.98 (ref 0.91–1.09)
LYMPHOCYTES # BLD AUTO: 1.8 10*3/MM3 (ref 0.72–4.86)
LYMPHOCYTES # BLD AUTO: 2.63 10*3/MM3 (ref 0.72–4.86)
LYMPHOCYTES NFR BLD AUTO: 16.3 % (ref 15–45)
LYMPHOCYTES NFR BLD AUTO: 23.1 % (ref 15–45)
MAGNESIUM SERPL-MCNC: 2.1 MG/DL (ref 1.4–2.2)
MCH RBC QN AUTO: 28.6 PG (ref 28–32)
MCH RBC QN AUTO: 29 PG (ref 28–32)
MCHC RBC AUTO-ENTMCNC: 33.7 G/DL (ref 33–36)
MCHC RBC AUTO-ENTMCNC: 34.7 G/DL (ref 33–36)
MCV RBC AUTO: 83.6 FL (ref 82–95)
MCV RBC AUTO: 84.8 FL (ref 82–95)
MONOCYTES # BLD AUTO: 0.2 10*3/MM3 (ref 0.19–1.3)
MONOCYTES # BLD AUTO: 0.65 10*3/MM3 (ref 0.19–1.3)
MONOCYTES NFR BLD AUTO: 1.8 % (ref 4–12)
MONOCYTES NFR BLD AUTO: 5.7 % (ref 4–12)
NEUTROPHILS # BLD AUTO: 8 10*3/MM3 (ref 1.87–8.4)
NEUTROPHILS # BLD AUTO: 8.95 10*3/MM3 (ref 1.87–8.4)
NEUTROPHILS NFR BLD AUTO: 70.2 % (ref 39–78)
NEUTROPHILS NFR BLD AUTO: 80.9 % (ref 39–78)
NRBC BLD AUTO-RTO: 0 /100 WBC (ref 0–0.2)
NRBC BLD AUTO-RTO: 0 /100 WBC (ref 0–0.2)
PLATELET # BLD AUTO: 390 10*3/MM3 (ref 130–400)
PLATELET # BLD AUTO: 418 10*3/MM3 (ref 130–400)
PMV BLD AUTO: 9.3 FL (ref 6–12)
PMV BLD AUTO: 9.3 FL (ref 6–12)
POTASSIUM BLD-SCNC: 4.1 MMOL/L (ref 3.5–5.3)
POTASSIUM BLD-SCNC: 4.2 MMOL/L (ref 3.5–5.3)
PROT SERPL-MCNC: 7.2 G/DL (ref 6.3–8.7)
PROTHROMBIN TIME: 13.3 SECONDS (ref 11.9–14.6)
RBC # BLD AUTO: 5 10*6/MM3 (ref 4.8–5.9)
RBC # BLD AUTO: 5 10*6/MM3 (ref 4.8–5.9)
SODIUM BLD-SCNC: 143 MMOL/L (ref 135–145)
SODIUM BLD-SCNC: 143 MMOL/L (ref 135–145)
VALPROATE SERPL-MCNC: 73.7 MCG/ML (ref 50–100)
WBC NRBC COR # BLD: 11.05 10*3/MM3 (ref 4.8–10.8)
WBC NRBC COR # BLD: 11.39 10*3/MM3 (ref 4.8–10.8)

## 2019-06-03 PROCEDURE — 80177 DRUG SCRN QUAN LEVETIRACETAM: CPT | Performed by: PSYCHIATRY & NEUROLOGY

## 2019-06-03 PROCEDURE — 80307 DRUG TEST PRSMV CHEM ANLYZR: CPT | Performed by: PSYCHIATRY & NEUROLOGY

## 2019-06-03 PROCEDURE — 0 GADOBENATE DIMEGLUMINE 529 MG/ML SOLUTION: Performed by: NEUROLOGICAL SURGERY

## 2019-06-03 PROCEDURE — 85025 COMPLETE CBC W/AUTO DIFF WBC: CPT | Performed by: EMERGENCY MEDICINE

## 2019-06-03 PROCEDURE — 80053 COMPREHEN METABOLIC PANEL: CPT | Performed by: EMERGENCY MEDICINE

## 2019-06-03 PROCEDURE — 70553 MRI BRAIN STEM W/O & W/DYE: CPT

## 2019-06-03 PROCEDURE — 25010000002 DEXAMETHASONE PER 1 MG: Performed by: EMERGENCY MEDICINE

## 2019-06-03 PROCEDURE — 99223 1ST HOSP IP/OBS HIGH 75: CPT | Performed by: PSYCHIATRY & NEUROLOGY

## 2019-06-03 PROCEDURE — 25010000002 HEPARIN (PORCINE) PER 1000 UNITS: Performed by: NEUROLOGICAL SURGERY

## 2019-06-03 PROCEDURE — 80164 ASSAY DIPROPYLACETIC ACD TOT: CPT | Performed by: EMERGENCY MEDICINE

## 2019-06-03 PROCEDURE — 99284 EMERGENCY DEPT VISIT MOD MDM: CPT

## 2019-06-03 PROCEDURE — A9577 INJ MULTIHANCE: HCPCS | Performed by: NEUROLOGICAL SURGERY

## 2019-06-03 PROCEDURE — 83735 ASSAY OF MAGNESIUM: CPT | Performed by: EMERGENCY MEDICINE

## 2019-06-03 PROCEDURE — 85610 PROTHROMBIN TIME: CPT | Performed by: EMERGENCY MEDICINE

## 2019-06-03 PROCEDURE — 85025 COMPLETE CBC W/AUTO DIFF WBC: CPT | Performed by: NEUROLOGICAL SURGERY

## 2019-06-03 PROCEDURE — 70450 CT HEAD/BRAIN W/O DYE: CPT

## 2019-06-03 RX ORDER — DEXAMETHASONE SODIUM PHOSPHATE 10 MG/ML
10 INJECTION INTRAMUSCULAR; INTRAVENOUS ONCE
Status: COMPLETED | OUTPATIENT
Start: 2019-06-03 | End: 2019-06-03

## 2019-06-03 RX ORDER — ASPIRIN 81 MG/1
81 TABLET, CHEWABLE ORAL DAILY
Status: DISCONTINUED | OUTPATIENT
Start: 2019-06-04 | End: 2019-06-06 | Stop reason: HOSPADM

## 2019-06-03 RX ORDER — FUROSEMIDE 20 MG/1
20 TABLET ORAL DAILY
Status: DISCONTINUED | OUTPATIENT
Start: 2019-06-04 | End: 2019-06-04 | Stop reason: SDUPTHER

## 2019-06-03 RX ORDER — LEVETIRACETAM 500 MG/1
1500 TABLET ORAL EVERY 12 HOURS SCHEDULED
Status: DISCONTINUED | OUTPATIENT
Start: 2019-06-03 | End: 2019-06-06 | Stop reason: HOSPADM

## 2019-06-03 RX ORDER — DEXAMETHASONE SODIUM PHOSPHATE 4 MG/ML
4 INJECTION, SOLUTION INTRA-ARTICULAR; INTRALESIONAL; INTRAMUSCULAR; INTRAVENOUS; SOFT TISSUE EVERY 6 HOURS
Status: DISCONTINUED | OUTPATIENT
Start: 2019-06-04 | End: 2019-06-06 | Stop reason: HOSPADM

## 2019-06-03 RX ORDER — SODIUM CHLORIDE 0.9 % (FLUSH) 0.9 %
3 SYRINGE (ML) INJECTION EVERY 12 HOURS SCHEDULED
Status: DISCONTINUED | OUTPATIENT
Start: 2019-06-03 | End: 2019-06-06 | Stop reason: HOSPADM

## 2019-06-03 RX ORDER — COLCHICINE 0.6 MG/1
0.6 TABLET ORAL DAILY
Status: DISCONTINUED | OUTPATIENT
Start: 2019-06-04 | End: 2019-06-06 | Stop reason: HOSPADM

## 2019-06-03 RX ORDER — CYCLOBENZAPRINE HCL 10 MG
10 TABLET ORAL 3 TIMES DAILY PRN
Status: DISCONTINUED | OUTPATIENT
Start: 2019-06-03 | End: 2019-06-06 | Stop reason: HOSPADM

## 2019-06-03 RX ORDER — OXYCODONE AND ACETAMINOPHEN 10; 325 MG/1; MG/1
1 TABLET ORAL EVERY 4 HOURS PRN
Status: DISCONTINUED | OUTPATIENT
Start: 2019-06-03 | End: 2019-06-06 | Stop reason: HOSPADM

## 2019-06-03 RX ORDER — VENLAFAXINE HYDROCHLORIDE 75 MG/1
150 CAPSULE, EXTENDED RELEASE ORAL DAILY
Status: DISCONTINUED | OUTPATIENT
Start: 2019-06-04 | End: 2019-06-06 | Stop reason: HOSPADM

## 2019-06-03 RX ORDER — SPIRONOLACTONE 25 MG/1
25 TABLET ORAL DAILY
Status: DISCONTINUED | OUTPATIENT
Start: 2019-06-04 | End: 2019-06-06 | Stop reason: HOSPADM

## 2019-06-03 RX ORDER — LOSARTAN POTASSIUM 50 MG/1
100 TABLET ORAL DAILY
Status: DISCONTINUED | OUTPATIENT
Start: 2019-06-04 | End: 2019-06-06 | Stop reason: HOSPADM

## 2019-06-03 RX ORDER — ALPRAZOLAM 0.25 MG/1
0.25 TABLET ORAL 2 TIMES DAILY PRN
Status: DISCONTINUED | OUTPATIENT
Start: 2019-06-03 | End: 2019-06-06 | Stop reason: HOSPADM

## 2019-06-03 RX ORDER — CARVEDILOL 25 MG/1
25 TABLET ORAL 2 TIMES DAILY WITH MEALS
Status: DISCONTINUED | OUTPATIENT
Start: 2019-06-03 | End: 2019-06-06 | Stop reason: HOSPADM

## 2019-06-03 RX ORDER — DIVALPROEX SODIUM 500 MG/1
1000 TABLET, DELAYED RELEASE ORAL 2 TIMES DAILY
Status: DISCONTINUED | OUTPATIENT
Start: 2019-06-03 | End: 2019-06-06 | Stop reason: HOSPADM

## 2019-06-03 RX ORDER — QUETIAPINE FUMARATE 100 MG/1
100 TABLET, FILM COATED ORAL NIGHTLY
Status: DISCONTINUED | OUTPATIENT
Start: 2019-06-03 | End: 2019-06-06 | Stop reason: HOSPADM

## 2019-06-03 RX ORDER — HEPARIN SODIUM 5000 [USP'U]/ML
5000 INJECTION, SOLUTION INTRAVENOUS; SUBCUTANEOUS EVERY 12 HOURS SCHEDULED
Status: DISCONTINUED | OUTPATIENT
Start: 2019-06-03 | End: 2019-06-06 | Stop reason: HOSPADM

## 2019-06-03 RX ORDER — SODIUM CHLORIDE 0.9 % (FLUSH) 0.9 %
3-10 SYRINGE (ML) INJECTION AS NEEDED
Status: DISCONTINUED | OUTPATIENT
Start: 2019-06-03 | End: 2019-06-06 | Stop reason: HOSPADM

## 2019-06-03 RX ORDER — ALLOPURINOL 100 MG/1
100 TABLET ORAL DAILY
Status: DISCONTINUED | OUTPATIENT
Start: 2019-06-04 | End: 2019-06-04 | Stop reason: SDUPTHER

## 2019-06-03 RX ORDER — LANOLIN ALCOHOL/MO/W.PET/CERES
100 CREAM (GRAM) TOPICAL DAILY
Status: DISCONTINUED | OUTPATIENT
Start: 2019-06-04 | End: 2019-06-06 | Stop reason: HOSPADM

## 2019-06-03 RX ORDER — PANTOPRAZOLE SODIUM 40 MG/1
40 TABLET, DELAYED RELEASE ORAL DAILY
Status: DISCONTINUED | OUTPATIENT
Start: 2019-06-04 | End: 2019-06-06 | Stop reason: HOSPADM

## 2019-06-03 RX ORDER — CLONIDINE HYDROCHLORIDE 0.1 MG/1
0.1 TABLET ORAL NIGHTLY
Status: DISCONTINUED | OUTPATIENT
Start: 2019-06-03 | End: 2019-06-06 | Stop reason: HOSPADM

## 2019-06-03 RX ADMIN — GADOBENATE DIMEGLUMINE 15 ML: 529 INJECTION, SOLUTION INTRAVENOUS at 23:15

## 2019-06-03 RX ADMIN — CARVEDILOL 25 MG: 25 TABLET, FILM COATED ORAL at 22:38

## 2019-06-03 RX ADMIN — LEVETIRACETAM 1500 MG: 500 TABLET, FILM COATED ORAL at 22:38

## 2019-06-03 RX ADMIN — DEXAMETHASONE SODIUM PHOSPHATE 10 MG: 10 INJECTION INTRAMUSCULAR; INTRAVENOUS at 20:11

## 2019-06-03 RX ADMIN — DIVALPROEX SODIUM 1000 MG: 500 TABLET, DELAYED RELEASE ORAL at 22:38

## 2019-06-03 RX ADMIN — SODIUM CHLORIDE, PRESERVATIVE FREE 3 ML: 5 INJECTION INTRAVENOUS at 22:38

## 2019-06-03 RX ADMIN — CLONIDINE HYDROCHLORIDE 0.1 MG: 0.1 TABLET ORAL at 22:38

## 2019-06-03 RX ADMIN — HEPARIN SODIUM 5000 UNITS: 5000 INJECTION INTRAVENOUS; SUBCUTANEOUS at 22:38

## 2019-06-03 RX ADMIN — QUETIAPINE FUMARATE 100 MG: 100 TABLET ORAL at 22:38

## 2019-06-03 NOTE — TELEPHONE ENCOUNTER
Ivonne said Lukasz is sleeping almost all the time since the Depakote was increased.  He is still having seizures when he is awake and he has almost lost the use of his left arm.  Notified that Galen wants her to take him to ER.

## 2019-06-04 ENCOUNTER — APPOINTMENT (OUTPATIENT)
Dept: NEUROLOGY | Facility: HOSPITAL | Age: 54
End: 2019-06-04

## 2019-06-04 LAB
AMPHET+METHAMPHET UR QL: NEGATIVE
BARBITURATES UR QL SCN: NEGATIVE
BENZODIAZ UR QL SCN: NEGATIVE
CANNABINOIDS SERPL QL: NEGATIVE
COCAINE UR QL: NEGATIVE
METHADONE UR QL SCN: NEGATIVE
OPIATES UR QL: NEGATIVE
PCP UR QL SCN: NEGATIVE
VALPROATE SERPL-MCNC: 83.3 MCG/ML (ref 50–100)

## 2019-06-04 PROCEDURE — 80307 DRUG TEST PRSMV CHEM ANLYZR: CPT | Performed by: PSYCHIATRY & NEUROLOGY

## 2019-06-04 PROCEDURE — 99233 SBSQ HOSP IP/OBS HIGH 50: CPT | Performed by: PSYCHIATRY & NEUROLOGY

## 2019-06-04 PROCEDURE — 97161 PT EVAL LOW COMPLEX 20 MIN: CPT

## 2019-06-04 PROCEDURE — 95816 EEG AWAKE AND DROWSY: CPT

## 2019-06-04 PROCEDURE — 95816 EEG AWAKE AND DROWSY: CPT | Performed by: PSYCHIATRY & NEUROLOGY

## 2019-06-04 PROCEDURE — 25010000002 DEXAMETHASONE PER 1 MG: Performed by: EMERGENCY MEDICINE

## 2019-06-04 PROCEDURE — 99024 POSTOP FOLLOW-UP VISIT: CPT | Performed by: NURSE PRACTITIONER

## 2019-06-04 PROCEDURE — 80164 ASSAY DIPROPYLACETIC ACD TOT: CPT | Performed by: PSYCHIATRY & NEUROLOGY

## 2019-06-04 PROCEDURE — 25010000002 HEPARIN (PORCINE) PER 1000 UNITS: Performed by: NEUROLOGICAL SURGERY

## 2019-06-04 PROCEDURE — 97165 OT EVAL LOW COMPLEX 30 MIN: CPT

## 2019-06-04 PROCEDURE — 92523 SPEECH SOUND LANG COMPREHEN: CPT | Performed by: SPEECH-LANGUAGE PATHOLOGIST

## 2019-06-04 RX ORDER — SPIRONOLACTONE 25 MG/1
25 TABLET ORAL DAILY
COMMUNITY
End: 2019-06-20 | Stop reason: HOSPADM

## 2019-06-04 RX ORDER — ALLOPURINOL 300 MG/1
300 TABLET ORAL DAILY
Status: DISCONTINUED | OUTPATIENT
Start: 2019-06-05 | End: 2019-06-06 | Stop reason: HOSPADM

## 2019-06-04 RX ORDER — LACOSAMIDE 50 MG/1
100 TABLET ORAL EVERY 12 HOURS SCHEDULED
Status: DISCONTINUED | OUTPATIENT
Start: 2019-06-05 | End: 2019-06-06 | Stop reason: HOSPADM

## 2019-06-04 RX ORDER — LOSARTAN POTASSIUM 100 MG/1
100 TABLET ORAL DAILY
COMMUNITY
End: 2019-07-03 | Stop reason: HOSPADM

## 2019-06-04 RX ORDER — FUROSEMIDE 20 MG/1
20 TABLET ORAL DAILY PRN
Status: ON HOLD | COMMUNITY
End: 2019-06-14

## 2019-06-04 RX ORDER — LACOSAMIDE 50 MG/1
50 TABLET ORAL EVERY 12 HOURS SCHEDULED
Status: DISCONTINUED | OUTPATIENT
Start: 2019-06-05 | End: 2019-06-04

## 2019-06-04 RX ORDER — ALLOPURINOL 300 MG/1
300 TABLET ORAL DAILY
COMMUNITY
End: 2019-07-19 | Stop reason: HOSPADM

## 2019-06-04 RX ORDER — LACOSAMIDE 50 MG/1
50 TABLET ORAL ONCE
Status: COMPLETED | OUTPATIENT
Start: 2019-06-04 | End: 2019-06-04

## 2019-06-04 RX ORDER — FUROSEMIDE 20 MG/1
20 TABLET ORAL DAILY PRN
Status: DISCONTINUED | OUTPATIENT
Start: 2019-06-04 | End: 2019-06-06 | Stop reason: HOSPADM

## 2019-06-04 RX ADMIN — SODIUM CHLORIDE 50 MG: 9 INJECTION, SOLUTION INTRAVENOUS at 21:40

## 2019-06-04 RX ADMIN — ALPRAZOLAM 0.25 MG: 0.25 TABLET ORAL at 09:52

## 2019-06-04 RX ADMIN — SODIUM CHLORIDE, PRESERVATIVE FREE 3 ML: 5 INJECTION INTRAVENOUS at 21:41

## 2019-06-04 RX ADMIN — Medication 100 MG: at 09:51

## 2019-06-04 RX ADMIN — DEXAMETHASONE SODIUM PHOSPHATE 4 MG: 4 INJECTION, SOLUTION INTRA-ARTICULAR; INTRALESIONAL; INTRAMUSCULAR; INTRAVENOUS; SOFT TISSUE at 13:51

## 2019-06-04 RX ADMIN — CARVEDILOL 25 MG: 25 TABLET, FILM COATED ORAL at 18:26

## 2019-06-04 RX ADMIN — DIVALPROEX SODIUM 1000 MG: 500 TABLET, DELAYED RELEASE ORAL at 21:40

## 2019-06-04 RX ADMIN — HEPARIN SODIUM 5000 UNITS: 5000 INJECTION INTRAVENOUS; SUBCUTANEOUS at 09:52

## 2019-06-04 RX ADMIN — DEXAMETHASONE SODIUM PHOSPHATE 4 MG: 4 INJECTION, SOLUTION INTRA-ARTICULAR; INTRALESIONAL; INTRAMUSCULAR; INTRAVENOUS; SOFT TISSUE at 06:40

## 2019-06-04 RX ADMIN — LEVETIRACETAM 1500 MG: 500 TABLET, FILM COATED ORAL at 21:40

## 2019-06-04 RX ADMIN — LOSARTAN POTASSIUM 100 MG: 50 TABLET, FILM COATED ORAL at 09:50

## 2019-06-04 RX ADMIN — SODIUM CHLORIDE 50 MG: 9 INJECTION, SOLUTION INTRAVENOUS at 10:38

## 2019-06-04 RX ADMIN — DEXAMETHASONE SODIUM PHOSPHATE 4 MG: 4 INJECTION, SOLUTION INTRA-ARTICULAR; INTRALESIONAL; INTRAMUSCULAR; INTRAVENOUS; SOFT TISSUE at 00:30

## 2019-06-04 RX ADMIN — DEXAMETHASONE SODIUM PHOSPHATE 4 MG: 4 INJECTION, SOLUTION INTRA-ARTICULAR; INTRALESIONAL; INTRAMUSCULAR; INTRAVENOUS; SOFT TISSUE at 18:30

## 2019-06-04 RX ADMIN — PANTOPRAZOLE SODIUM 40 MG: 40 TABLET, DELAYED RELEASE ORAL at 09:51

## 2019-06-04 RX ADMIN — VENLAFAXINE HYDROCHLORIDE 150 MG: 75 CAPSULE, EXTENDED RELEASE ORAL at 09:52

## 2019-06-04 RX ADMIN — DIVALPROEX SODIUM 1000 MG: 500 TABLET, DELAYED RELEASE ORAL at 09:51

## 2019-06-04 RX ADMIN — SPIRONOLACTONE 25 MG: 25 TABLET ORAL at 09:52

## 2019-06-04 RX ADMIN — LACOSAMIDE 50 MG: 50 TABLET, FILM COATED ORAL at 21:45

## 2019-06-04 RX ADMIN — QUETIAPINE FUMARATE 100 MG: 100 TABLET ORAL at 21:40

## 2019-06-04 RX ADMIN — CARVEDILOL 25 MG: 25 TABLET, FILM COATED ORAL at 09:51

## 2019-06-04 RX ADMIN — SODIUM CHLORIDE 50 MG: 9 INJECTION, SOLUTION INTRAVENOUS at 00:29

## 2019-06-04 RX ADMIN — SODIUM CHLORIDE, PRESERVATIVE FREE 3 ML: 5 INJECTION INTRAVENOUS at 09:52

## 2019-06-04 RX ADMIN — HEPARIN SODIUM 5000 UNITS: 5000 INJECTION INTRAVENOUS; SUBCUTANEOUS at 21:40

## 2019-06-04 RX ADMIN — FUROSEMIDE 20 MG: 20 TABLET ORAL at 09:51

## 2019-06-04 RX ADMIN — LEVETIRACETAM 1500 MG: 500 TABLET, FILM COATED ORAL at 09:51

## 2019-06-04 RX ADMIN — ASPIRIN 81 MG: 81 TABLET, CHEWABLE ORAL at 09:50

## 2019-06-04 RX ADMIN — ALLOPURINOL 100 MG: 100 TABLET ORAL at 09:51

## 2019-06-04 NOTE — THERAPY EVALUATION
Acute Care - Physical Therapy Initial Evaluation  [facility]     Patient Name: Lukasz Savage  : 1965  MRN: [id number]  Today's Date: 2019   Onset of Illness/Injury or Date of Surgery: 19  Date of Referral to PT: 19  Referring Physician: Dr. Crowe      Admit Date: 6/3/2019    Visit Dx:     ICD-10-CM ICD-9-CM   1. Benign neoplasm of brain, unspecified brain region (CMS/HCC) D33.2 225.0   2. Left arm weakness R29.898 729.89   3. Decreased activities of daily living (ADL) R68.89 780.99   4. Slurred speech R47.81 784.59     Patient Active Problem List   Diagnosis   • Hyperlipidemia   • HTN (hypertension)   • Gout   • Anxiety   • Arthritis   • Erectile dysfunction   • Angio-edema   • Seizures (CMS/HCC)   • HCAP (healthcare-associated pneumonia)   • MSSA (methicillin susceptible Staphylococcus aureus) infection   • Foot deformity   • Chronic gout of right foot   • Peripheral edema   • S/P shoulder surgery   • Claudication (CMS/HCC)   • Cigarette smoker   • BMI 30.0-30.9,adult   • BMI 33.0-33.9,adult   • Astrocytoma brain tumor (CMS/HCC)   • Encounter for consultation   • Benign neoplasm of brain (CMS/HCC)     Past Medical History:   Diagnosis Date   • Angio-edema 7/3/2017   • Anxiety    • Arthritis    • Cancer (CMS/HCC) 2019    Brain cancer diagnoised yesterday  Dr Trevino    • Clostridium difficile colitis    • Erectile dysfunction 10/6/2016   • GERD (gastroesophageal reflux disease)    • Gout    • HCAP (healthcare-associated pneumonia) 2017   • Hyperlipidemia    • Malignant hypertension    • MSSA (methicillin susceptible Staphylococcus aureus) infection 2017   • Obstructive sleep apnea    • Seizures (CMS/HCC) 2017     Past Surgical History:   Procedure Laterality Date   • COLONOSCOPY     • CRANIOTOMY Right 4/15/2019    Procedure: CRANIOTOMY WITH BIOPSY RIGHT;  Surgeon: Dillon Trevino MD;  Location: [facility];  Service: Neurosurgery   • SHOULDER ARTHROSCOPY Left    •  TRACHEOSTOMY N/A 7/12/2017    Procedure: TRACHEOSTOMY WITH TRACHEOSCOPY;  Surgeon: Jairon Pierson MD;  Location: [facility] OR;  Service:         PT ASSESSMENT (last 12 hours)      Physical Therapy Evaluation     Row Name 06/04/19 0756          PT Evaluation Time/Intention    Subjective Information  complains of;pain  -MS (r) AL (t) MS (c)     Document Type  evaluation  -MS (r) AL (t) MS (c)     Mode of Treatment  concurrent therapy;physical therapy  -MS (r) AL (t) MS (c)     Row Name 06/04/19 0756          General Information    Patient Profile Reviewed?  yes  -MS (r) AL (t) MS (c)     Onset of Illness/Injury or Date of Surgery  06/03/19  -MS (r) AL (t) MS (c)     Referring Physician  Dr. Crowe  -MS (r) AL (t) MS (c)     Patient Observations  alert;cooperative;agree to therapy  -MS (r) AL (t) MS (c)     Patient/Family Observations  Pt.'s mom in room  -MS (r) AL (t) MS (c)     General Observations of Patient  Pt. was in bed in wright's position on RA and slurred speech  -MS (r) AL (t) MS (c)     Prior Level of Function  independent:;gait;community mobility;all household mobility;bathing;transfer  -MS (r) AL (t) MS (c)     Equipment Currently Used at Home  cane, straight  -MS (r) AL (t) MS (c)     Pertinent History of Current Functional Problem  Benign neoplasm of brain, focal part seizures, L arm weakness  -MS (r) AL (t) MS (c)     Existing Precautions/Restrictions  seizures;fall  -MS (r) AL (t) MS (c)     Risks Reviewed  patient:;LOB;dizziness;nausea/vomiting  -MS (r) AL (t) MS (c)     Benefits Reviewed  patient:;increase knowledge;improve function;increase independence  -MS (r) AL (t) MS (c)     Barriers to Rehab  none identified  -MS (r) AL (t) MS (c)     Row Name 06/04/19 0756          Relationship/Environment    Primary Source of Support/Comfort  parent  -MS (r) AL (t) MS (c)     Lives With  alone  -MS (r) AL (t) MS (c)     Family Caregiver if Needed  parent(s)  -MS (r) AL (t) MS (c)     Row Name 06/04/19 0756           Resource/Environmental Concerns    Current Living Arrangements  home/apartment/condo  -MS (r) AL (t) MS (c)     Row Name 06/04/19 0756          Home Main Entrance    Number of Stairs, Main Entrance  five  -MS (r) AL (t) MS (c)     Stair Railings, Main Entrance  none  -MS (r) AL (t) MS (c)     Stairs Comment, Main Entrance  Pt. stated he might go to live and parent's house which as 2 steps into house  -MS (r) AL (t) MS (c)     Row Name 06/04/19 0756          Cognitive Assessment/Interventions    Additional Documentation  Cognitive Assessment/Intervention (Group)  -MS (r) AL (t) MS (c)     Row Name 06/04/19 0752          Cognitive Assessment/Intervention- PT/OT    Affect/Mental Status (Cognitive)  WFL  -MS (r) AL (t) MS (c)     Orientation Status (Cognition)  oriented x 4  -MS (r) AL (t) MS (c)     Follows Commands (Cognition)  WNL  -MS (r) AL (t) MS (c)     Cognitive Function (Cognitive)  WNL  -MS (r) AL (t) MS (c)     Personal Safety Interventions  fall prevention program maintained;gait belt;nonskid shoes/slippers when out of bed;supervised activity  -MS (r) AL (t) MS (c)     Row Name 06/04/19 0757          Safety Issues, Functional Mobility    Impairments Affecting Function (Mobility)  coordination  -MS (r) AL (t) MS (c)     Row Name 06/04/19 0756          Bed Mobility Assessment/Treatment    Bed Mobility Assessment/Treatment  supine-sit  -MS (r) AL (t) MS (c)     Supine-Sit [place] (Bed Mobility)  conditional independence  -MS (r) AL (t) MS (c)     Assistive Device (Bed Mobility)  bed rails  -MS (r) AL (t) MS (c)     Row Name 06/04/19 075          Transfer Assessment/Treatment    Transfer Assessment/Treatment  sit-stand transfer;stand-sit transfer  -MS (r) AL (t) MS (c)     Sit-Stand [place] (Transfers)  independent  -MS (r) AL (t) MS (c)     Stand-Sit [place] (Transfers)  independent  -MS (r) AL (t) MS (c)     Row Name 06/04/19 0752          Gait/Stairs Assessment/Training     [place] Level (Gait)  supervision  -MS (r) AL (t) MS (c)     Distance in Feet (Gait)  Pt. ambulated 500ft on even surfaces with head movements and left trunk lean.   -MS (r) AL (t) MS (c)     Negotiation (Stairs)  stairs independence;number of steps;ascending technique;descending technique  -MS (r) AL (t) MS (c)     [place] Level (Stairs)  supervision  -MS (r) AL (t) MS (c)     Handrail Location (Stairs)  none  -MS (r) AL (t) MS (c)     Number of Steps (Stairs)  10  -MS (r) AL (t) MS (c)     Ascending Technique (Stairs)  step-over-step  -MS (r) AL (t) MS (c)     Descending Technique (Stairs)  step-to-step  -MS (r) AL (t) MS (c)     Stairs, Impairments  coordination impaired;other (see comments) impaired with descending steps with foot placement  -MS (r) AL (t) MS (c)     Row Name 06/04/19 0756          General ROM    GENERAL ROM COMMENTS  B LE AROM WNL  -MS (r) AL (t) MS (c)     Row Name 06/04/19 0756          MMT (Manual Muscle Testing)    General MMT Comments  B LE gross strength 5/5  -MS (r) AL (t) MS (c)     Row Name 06/04/19 0756          Motor Assessment/Intervention    Additional Documentation  Balance (Group)  -MS (r) AL (t) MS (c)     Row Name 06/04/19 0756          Balance    Balance  static sitting balance;static standing balance  -MS (r) AL (t) MS (c)     Row Name 06/04/19 0756          Static Sitting Balance    Level of [place] (Unsupported Sitting, Static Balance)  independent  -MS (r) AL (t) MS (c)     Sitting Position (Unsupported Sitting, Static Balance)  sitting on edge of bed  -MS (r) AL (t) MS (c)     Time Able to Maintain Position (Unsupported Sitting, Static Balance)  2 to 3 minutes  -MS (r) AL (t) MS (c)     Row Name 06/04/19 0756          Static Standing Balance    Level of [place] (Unsupported Standing, Static Balance)  independent  -MS (r) AL (t) MS (c)     Time Able to Maintain Position (Unsupported Standing, Static Balance)  1 to 2 minutes  -MS (r) AL (t) MS (c)      Comment (Unsupported Standing, Static Balance)  Able to remain upright with EO with hip width stance  -MS (r) AL (t) MS (c)     Row Name 06/04/19 0756          Sensory Assessment/Intervention    Sensory General Assessment  no sensation deficits identified;other (see comments) for B LE  -MS (r) AL (t) MS (c)     Row Name 06/04/19 0756          Pain Assessment    Additional Documentation  Pain Scale: Numbers Pre/Post-Treatment (Group)  -MS (r) AL (t) MS (c)     Row Name 06/04/19 0756          Pain Scale: Numbers Pre/Post-Treatment    Pain Scale: Numbers, Pretreatment  2/10  -MS (r) AL (t) MS (c)     Pain Scale: Numbers, Post-Treatment  2/10  -MS (r) AL (t) MS (c)     Pain Location  other (see comments) ribs from previous rib fx  -MS (r) AL (t) MS (c)     Pain Intervention(s)  Repositioned;Ambulation/increased activity  -MS (r) AL (t) MS (c)     Row Name             [REMOVED] Wound 04/15/19 1000 Right head incision    Wound - Properties Group Date first assessed: 04/15/19  -JAQUELINE Time first assessed: 1000  -JAQUELINE Side: Right  -JAQUELINE Location: head  -JAQUELINE Type: incision  -JAQUELINE Resolution Date: 06/04/19  -DB Resolution Time: 1233  -DB    Row Name 06/04/19 0756          Plan of Care Review    Plan of Care Reviewed With  patient  -MS (r) AL (t) MS (c)     Row Name 06/04/19 0756          Physical Therapy Clinical Impression    Date of Referral to PT  06/03/19  -MS (r) AL (t) MS (c)     Patient/Family Goals Statement (PT Clinical Impression)  Pt. stated to return home to his house  -MS (r) AL (t) MS (c)     Criteria for Skilled Interventions Met (PT Clinical Impression)  no  -MS (r) AL (t) MS (c)     Pathology/Pathophysiology Noted (Describe Specifically for Each System)  neuromuscular  -MS (r) AL (t) MS (c)     Rehab Potential (PT Clinical Summary)  good, to achieve stated therapy goals  -MS (r) AL (t) MS (c)     Care Plan Review (PT)  evaluation/treatment results reviewed;risks/benefits reviewed;patient/other agree to care plan  -MS  (r) AL (t) MS (c)     Row Name 06/04/19 0756          Positioning and Restraints    Pre-Treatment Position  in bed  -MS (r) AL (t) MS (c)     Post Treatment Position  chair  -MS (r) AL (t) MS (c)     In Chair  sitting;call light within reach;encouraged to call for assist;with family/caregiver;with OT  -MS (r) AL (t) MS (c)     Row Name 06/04/19 0756          Physical Therapy Discharge Summary    Additional Documentation  Discharge Summary, PT Eval (Group)  -MS (r) AL (t) MS (c)     Row Name 06/04/19 0756          Discharge Summary, PT Eval    Reason for Discharge (PT Discharge Summary)  no further needs identified  -MS (r) AL (t) MS (c)     Outcomes Achieved Upon Discharge (PT Discharge Summary)  evaluation only  -MS (r) AL (t) MS (c)     Row Name 06/04/19 0756          Living Environment    Home Accessibility  stairs to enter home  -MS (r) AL (t) MS (c)       User Key  (r) = Recorded By, (t) = Taken By, (c) = Cosigned By    Initials Name Provider Type    MS Silvina Grover R, PT, DPT, NCS Physical Therapist    DB Isabella Cárdenas, RNA Registered Nurse    Art Latif, RN Registered Nurse    Brandon Silver, PT Student PT Student        Physical Therapy Education     Title: PT OT SLP Therapies (In Progress)     Topic: Physical Therapy (In Progress)     Point: Precautions (Done)     Learning Progress Summary           Patient Acceptance, E,TB, VU,DU by AL at 6/4/2019 10:01 AM    Comment:  PT educated pt. as being a fall risk and needs someone around when getting up OOB.                               User Key     Initials Effective Dates Name Provider Type Discipline    AL 04/24/19 -  Brandon Phillips, PT Student PT Student PT              PT Recommendation and Plan  Anticipated Discharge Disposition (PT): home, home with assist  Therapy Frequency (PT Clinical Impression): evaluation only  Outcome Summary/Treatment Plan (PT)  Anticipated Discharge Disposition (PT): home, home with assist  Reason for Discharge (PT Discharge  Summary): no further needs identified  Plan of Care Reviewed With: patient  Progress: improving  Outcome Summary: Pt. was in bed at start of evaluation. Pt. performed bed mobility and transfers independently. Pt. ambulated with supervision with decreased gait speed and L lateral lean possibly due to L sided seizures and weakness in L UE. He ascended and descended 10 steps with supervision with no handrails. PT recommends home or home with assist depending on pt.'s progression with treatment at hospital.   Outcome Measures     Row Name 06/04/19 1000 06/04/19 0756          How much help from another person do you currently need...    Turning from your back to your side while in flat bed without using bedrails?  --  4  -MS (r) AL (t) MS (c)     Moving from lying on back to sitting on the side of a flat bed without bedrails?  --  4  -MS (r) AL (t) MS (c)     Moving to and from a bed to a chair (including a wheelchair)?  --  4  -MS (r) AL (t) MS (c)     Standing up from a chair using your arms (e.g., wheelchair, bedside chair)?  --  4  -MS (r) AL (t) MS (c)     Climbing 3-5 steps with a railing?  --  3  -MS (r) AL (t) MS (c)     To walk in hospital room?  --  3  -MS (r) AL (t) MS (c)     AM-PAC 6 Clicks Score  --  22  -DB (r) AL (t)        How much help from another is currently needed...    Putting on and taking off regular lower body clothing?  4  -CH (r) LH (t) CH (c)  --     Bathing (including washing, rinsing, and drying)  3  -CH (r) LH (t) CH (c)  --     Toileting (which includes using toilet bed pan or urinal)  4  -CH (r) LH (t) CH (c)  --     Putting on and taking off regular upper body clothing  4  -CH (r) LH (t) CH (c)  --     Taking care of personal grooming (such as brushing teeth)  4  -CH (r) LH (t) CH (c)  --     Eating meals  4  -CH (r) LH (t) CH (c)  --     Score  23  -CH (r) LH (t)  --        Functional Assessment    Outcome Measure Options  AM-PAC 6 Clicks Daily Activity (OT)  -CH (r) LH (t) CH (c)   AM-PAC 6 Clicks Basic Mobility (PT)  -MS (r) AL (t) MS (c)       User Key  (r) = Recorded By, (t) = Taken By, (c) = Cosigned By    Initials Name Provider Type    Katie Butt, OTR/L Occupational Therapist    Silvina Jarrell EMANUEL, PT, DPT, NCS Physical Therapist    DB Isabella Cárdenas, RNA Registered Nurse    LH Colby Marti, OT Student OT Student    Brandon Silver, PT Student PT Student         Time Calculation:   PT Charges     Row Name 06/04/19 1002             Time Calculation    Start Time  0756 PT chart reviewed from 0741 to 0750  -MS (r) AL (t) MS (c)      Stop Time  0846  -MS (r) AL (t) MS (c)      Time Calculation (min)  50 min  -MS (r) AL (t)      PT Received On  06/04/19  -MS (r) AL (t) MS (c)        User Key  (r) = Recorded By, (t) = Taken By, (c) = Cosigned By    Initials Name Provider Type    Silvina Jarrell EMANUEL, PT, DPT, NCS Physical Therapist    Brandon Silver, PT Student PT Student        Therapy Charges for Today     Code Description Service Date Service Provider Modifiers Qty    34918700156 HC PT EVAL LOW COMPLEXITY 4 6/4/2019 Brandon Phillips, PT Student GP 1          PT G-Codes  Outcome Measure Options: AM-PAC 6 Clicks Daily Activity (OT)  AM-PAC 6 Clicks Score: 22  Score: 23      BRANDON Phillips PT Student  6/4/2019

## 2019-06-04 NOTE — PLAN OF CARE
Problem: Patient Care Overview  Goal: Plan of Care Review  Outcome: Ongoing (interventions implemented as appropriate)   06/04/19 1043   Coping/Psychosocial   Plan of Care Reviewed With patient;mother   Plan of Care Review   Progress no change  (Initial Evaluation )   OTHER   Outcome Summary Speech/Language/Cognitive evaluation completed. Consulted due to slurred speech s/p seizure activity. 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 Patient displayed no cognitive or speech deficits upon evaluation on this date and time. His attention to task was functional. His mother was present and tells me that his affect and personality is also baseline. SLP discussed possible future changes in cognition due to area of mass. His mother verbalizes concerns with not being able to understand him at baseline due to mumbled speech. ST advised patient to over articulate and speak loudly when he is not understood by his mother or others. Patient does not present with an impairment that warrants further speech services. SLP to d/c at this time. If concerns arise please reconsult.

## 2019-06-04 NOTE — ACP (ADVANCE CARE PLANNING)
"Date of First Steps ACP interview: 6/4/2019  Location of interview: Pt's room  First Steps ACP Facilitator: Dora Gay RN  Referral Source: nurse  Present for facilitation: Patient    SUMMARY OF ADVANCE CARE PLANNING DISCUSSION:  Lukasz visited for consult for First Steps facilitation. We reviewed purpose and goals for advance care planning.    Lukasz states \" I have taken care of this with an  and do not need any thing else!\"  I explained about the consult and offered any other assistance from pastoral care. He was appreciative but declined any further assistance.    Advance care/living will document finished during this facilitation? no    Time spent on preparation, facilitation and documentation was under 30 minutes.    RECOMMENDATIONS/FOLLOW-UP:  Patient states already has, encouraged to bring copy for medical records.    CONSULT/NOTE ROUTED  yes    Dora Gay RN  "

## 2019-06-04 NOTE — PLAN OF CARE
Problem: Patient Care Overview  Goal: Plan of Care Review   06/04/19 0957   Coping/Psychosocial   Plan of Care Reviewed With patient   Plan of Care Review   Progress improving   OTHER   Outcome Summary Pt. was in bed at start of evaluation. Pt. performed bed mobility and transfers independently. Pt. ambulated with supervision with decreased gait speed and L lateral lean possibly due to L sided seizures and weakness in L UE. He ascended and descended 10 steps with supervision with no handrails. PT recommends home or home with assist depending on pt.'s progression with treatment at hospital.

## 2019-06-04 NOTE — THERAPY DISCHARGE NOTE
Acute Care - Occupational Therapy Initial Eval/Discharge  [facility]     Patient Name: Lukasz Savage  : 1965  MRN: [id number]  Today's Date: 2019  Onset of Illness/Injury or Date of Surgery: (P) 19  Date of Referral to OT: (P) 19  Referring Physician: (P) Dr. Crowe      Admit Date: 6/3/2019       ICD-10-CM ICD-9-CM   1. Benign neoplasm of brain, unspecified brain region (CMS/HCC) D33.2 225.0   2. Left arm weakness R29.898 729.89   3. Decreased activities of daily living (ADL) R68.89 780.99   4. Slurred speech R47.81 784.59     Patient Active Problem List   Diagnosis   • Hyperlipidemia   • HTN (hypertension)   • Gout   • Anxiety   • Arthritis   • Erectile dysfunction   • Angio-edema   • Seizures (CMS/HCC)   • HCAP (healthcare-associated pneumonia)   • MSSA (methicillin susceptible Staphylococcus aureus) infection   • Foot deformity   • Chronic gout of right foot   • Peripheral edema   • S/P shoulder surgery   • Claudication (CMS/HCC)   • Cigarette smoker   • BMI 30.0-30.9,adult   • BMI 33.0-33.9,adult   • Astrocytoma brain tumor (CMS/HCC)   • Encounter for consultation   • Benign neoplasm of brain (CMS/HCC)     Past Medical History:   Diagnosis Date   • Angio-edema 7/3/2017   • Anxiety    • Arthritis    • Cancer (CMS/HCC) 2019    Brain cancer diagnoised yesterday  Dr Trevino    • Clostridium difficile colitis    • Erectile dysfunction 10/6/2016   • GERD (gastroesophageal reflux disease)    • Gout    • HCAP (healthcare-associated pneumonia) 2017   • Hyperlipidemia    • Malignant hypertension    • MSSA (methicillin susceptible Staphylococcus aureus) infection 2017   • Obstructive sleep apnea    • Seizures (CMS/HCC) 2017     Past Surgical History:   Procedure Laterality Date   • COLONOSCOPY     • CRANIOTOMY Right 4/15/2019    Procedure: CRANIOTOMY WITH BIOPSY RIGHT;  Surgeon: Dillon Trevino MD;  Location: [facility];  Service: Neurosurgery   • SHOULDER ARTHROSCOPY  Left    • TRACHEOSTOMY N/A 7/12/2017    Procedure: TRACHEOSTOMY WITH TRACHEOSCOPY;  Surgeon: Jairon Pierson MD;  Location: [facility] OR;  Service:           OT ASSESSMENT FLOWSHEET (last 12 hours)      Occupational Therapy Evaluation     Row Name 06/04/19 0800 06/04/19 0756                OT Evaluation Time/Intention    Subjective Information  complains of;pain  (Pended)   -  --       Document Type  evaluation  (Pended)   -  --       Mode of Treatment  occupational therapy  (Pended)   -  --          General Information    Patient Profile Reviewed?  yes  (Pended)   -  --       Onset of Illness/Injury or Date of Surgery  06/03/19  (Pended)   -  --       Referring Physician  Dr. Crowe  (Pended)   -  --       Patient Observations  alert;cooperative;agree to therapy  (Pended)   -  --       Patient/Family Observations  pt. mom in room   (Pended)   -  --       General Observations of Patient  Pt. was in bed in wright's position on RA and slurred speech  (Pended)   -  --       Prior Level of Function  independent:;gait;community mobility;all household mobility;bathing;transfer  (Pended)   -  --       Equipment Currently Used at Home  cane, straight  (Pended)   -  --       Pertinent History of Current Functional Problem  Benign neoplasm of brain, focal part seizures, L arm weakness  (Pended)   -  --       Existing Precautions/Restrictions  seizures  (Pended)   -  --       Risks Reviewed  patient:;LOB;dizziness;nausea/vomiting  (Pended)   -  --       Benefits Reviewed  patient:;increase knowledge;improve function;increase independence  (Pended)   -  --       Barriers to Rehab  none identified  (Pended)   -  --          Relationship/Environment    Lives With  alone  (Pended)   -  --       Family Caregiver if Needed  parent(s)  (Pended)   -  --       Primary Roles/Responsibilities  other (see comments)  (Pended)  unemployed   -  --          Resource/Environmental Concerns    Current  Living Arrangements  home/apartment/condo  (Pended)   -  --          Home Main Entrance    Number of Stairs, Main Entrance  two  (Pended)   -  --       Stair Railings, Main Entrance  none  (Pended)   -  --       Stairs Comment, Main Entrance  pt. house has 5 stairs to enter; no handrails; tub/shower not walk in at pt. house. Pt plans to move back in w/parents. Parents home entrance reflected above.Pt. parent bathroom is walk-in w/built in bench.    (Pended)   -  --          Cognitive Assessment/Interventions    Additional Documentation  Cognitive Assessment/Intervention (Group)  (Pended)   -  --          Cognitive Assessment/Intervention- PT/OT    Affect/Mental Status (Cognitive)  WFL  (Pended)   -  --       Orientation Status (Cognition)  oriented x 4  (Pended)   -  --       Follows Commands (Cognition)  WNL  (Pended)   -  --       Cognitive Function (Cognitive)  WNL  (Pended)   -  --       Personal Safety Interventions  fall prevention program maintained;muscle strengthening facilitated;gait belt;nonskid shoes/slippers when out of bed;supervised activity  (Pended)   -  --          Safety Issues, Functional Mobility    Impairments Affecting Function (Mobility)  pain  (Pended)   -  --          Bed Mobility Assessment/Treatment    Bed Mobility Assessment/Treatment  supine-sit  (Pended)   -  --       Supine-Sit K[place] (Bed Mobility)  independent  (Pended)   -  --          Functional Mobility    Functional Mobility- Ind. Level  contact guard assist;1 person  (Pended)   -  --       Functional Mobility- Comment  pt. ambulated in hallway and up/down stairs in stairway  (Pended)   -  --          Transfer Assessment/Treatment    Transfer Assessment/Treatment  sit-stand transfer;stand-sit transfer  (Pended)   -  --          Sit-Stand Transfer    Sit-Stand Kinder (Transfers)  independent  (Pended)   -  --          Stand-Sit Transfer    Stand-Sit Kinder (Transfers)   independent  (Pended)   -  --          ADL Assessment/Intervention    BADL Assessment/Intervention  lower body dressing  (Pended)   -  --          Lower Body Dressing Assessment/Training    Lower Body Dressing [place] Level  independent;don;doff;shoes/slippers  (Pended)   -  --  (Pended)   -       Lower Body Dressing Position  edge of bed sitting  (Pended)   -  --  (Pended)   -          BADL Safety/Performance    Impairments, BADL Safety/Performance  other (see comments)  (Pended)  impaired LUE motor coordination w/EC  -  --  (Pended)   -          General ROM    GENERAL ROM COMMENTS  BUE ROM WNL   (Pended)   -  --          MMT (Manual Muscle Testing)    General MMT Comments  RUE strength 5/5. LUE Strength 4+/5. Pt. has noticable less composite fist strength in L manual.   (Pended)   -  --          Motor Assessment/Interventions    Additional Documentation  Balance (Group);Fine Motor Testing & Training (Group)  (Pended)   -  --          Balance    Balance  static sitting balance;static standing balance;dynamic sitting balance  (Pended)   -  --          Static Sitting Balance    Level of [place] (Unsupported Sitting, Static Balance)  independent  (Pended)   -  --       Sitting Position (Unsupported Sitting, Static Balance)  sitting on edge of bed  (Pended)   -  --       Time Able to Maintain Position (Unsupported Sitting, Static Balance)  2 to 3 minutes  (Pended)   -  --          Dynamic Sitting Balance    Level of [place], Reaches Outside Midline (Sitting, Dynamic Balance)  independent  (Pended)   -  --       Sitting Position, Reaches Outside Midline (Sitting, Dynamic Balance)  sitting on edge of bed  (Pended)   -  --       Comment, Reaches Outside Midline (Sitting, Dynamic Balance)  perfromed during LBD   (Pended)   -  --          Static Standing Balance    Level of [place] (Unsupported Standing, Static Balance)  independent  (Pended)   -  --       Time Able  to Maintain Position (Unsupported Standing, Static Balance)  1 to 2 minutes  (Pended)   -  --          Fine Motor Testing & Training    Comment, Fine Motor Coordination  pt. able to acheive opposition w/B hands   (Pended)   -  --          Sensory Assessment/Intervention    Sensory General Assessment  no sensation deficits identified  (Pended)   -  --       Additional Documentation  Vision Assessment/Intervention (Group)  (Pended)   -  --          Vision Assessment/Intervention    Impact of Vision Impairment on Function (Vision)  Tested visual tracking WNL.Peripheral vision tested WNL.    (Pended)   -  --          Positioning and Restraints    Pre-Treatment Position  in bed  (Pended)   -  --       Post Treatment Position  chair  (Pended)   -  --       In Chair  call light within reach;encouraged to call for assist;with family/caregiver  (Pended)   -  --          Pain Assessment    Additional Documentation  Pain Scale: Numbers Pre/Post-Treatment (Group)  (Pended)   -  --          Pain Scale: Numbers Pre/Post-Treatment    Pain Scale: Numbers, Pretreatment  2/10  (Pended)   -  --       Pain Scale: Numbers, Post-Treatment  2/10  (Pended)   -  --       Pain Location - Side  Left  (Pended)   -  --       Pain Location  other (see comments)  (Pended)  ribs from previous fracture   -  --       Pain Intervention(s)  Repositioned;Ambulation/increased activity  (Pended)   -  --          [REMOVED] Wound 04/15/19 1000 Right head incision    Wound - Properties Group Date first assessed: 04/15/19  -JAQUELINE Time first assessed: 1000  -JAQUELINE Side: Right  -JAQUELINE Location: head  -JAQUELINE Type: incision  -JAQUELINE Resolution Date: 06/04/19  -DB Resolution Time: 1233  -DB       Coping    Observed Emotional State  accepting;calm;cooperative  (Pended)   -  --          Plan of Care Review    Plan of Care Reviewed With  patient  (Pended)   -  --          Clinical Impression (OT)    Date of Referral to OT  06/03/19  (Pended)   -   --       Patient/Family Goals Statement (OT Eval)  return home and live w/pt. parents   (Pended)   -  --       Criteria for Skilled Therapeutic Interventions Met (OT Eval)  no problems identified which require skilled intervention  (Pended)   -  --       Therapy Frequency (OT Eval)  evaluation only  (Pended)   -  --       Care Plan Review (OT)  evaluation/treatment results reviewed  (Pended)   -  --       Anticipated Discharge Disposition (OT)  other (see comments);home with assist  (Pended)  pending on fx/l decline with chemotherapy.   -  --          Living Environment    Home Accessibility  stairs to enter home  (Pended)   -  --         User Key  (r) = Recorded By, (t) = Taken By, (c) = Cosigned By    Initials Name Effective Dates    DB Isabella Cárdenas, RNA 06/02/17 -     Art Latif, RN 08/02/16 -      Colby Marti, OT Student 04/15/19 -           Occupational Therapy Education     Title: PT OT SLP Therapies (In Progress)     Topic: Occupational Therapy (In Progress)     Point: ADL training (Done)     Description: Instruct learner(s) on proper safety adaptation and remediation techniques during self care or transfers.   Instruct in proper use of assistive devices.    Learning Progress Summary           Patient Acceptance, E, VU by  at 6/4/2019 10:07 AM    Comment:  Pt. education on OT service and adaptive tools for fine motor handling.                   Point: Precautions (Done)     Description: Instruct learner(s) on prescribed precautions during self-care and functional transfers.    Learning Progress Summary           Patient Acceptance, E, VU by  at 6/4/2019 10:07 AM    Comment:  Pt. education on OT service and adaptive tools for fine motor handling.                   Point: Body mechanics (Done)     Description: Instruct learner(s) on proper positioning and spine alignment during self-care, functional mobility activities and/or exercises.    Learning Progress Summary            Patient Acceptance, JAIME VU by  at 6/4/2019 10:07 AM    Comment:  Pt. education on OT service and adaptive tools for fine motor handling.                               User Key     Initials Effective Dates Name Provider Type Discipline     04/15/19 -  Colby Marti, OT Student OT Student OT                OT Recommendation and Plan  Outcome Summary/Treatment Plan (OT)  Anticipated Discharge Disposition (OT): (P) other (see comments), home with assist(pending on fx/l decline with chemotherapy. )  Therapy Frequency (OT Eval): (P) evaluation only  Plan of Care Review  Plan of Care Reviewed With: (P) patient  Plan of Care Reviewed With: (P) patient  Outcome Summary: (P) OT eval complete. Pt. report pain in L rib as 2/10. A&Ox 4. Pt. sup-sit independently. Pt. performed LBD indepedently dojn/doff socks and donning shoes. Pt. sit-stand/stand-sit independently. Pt. ambulated in hallway and in stairwell w/CGA x 1. Pt. current performance skills do not require skilled OT services at this time. D/c home w/assist.          Outcome Measures     Row Name 06/04/19 1000 06/04/19 0756          How much help from another person do you currently need...    Turning from your back to your side while in flat bed without using bedrails?  --  4  (Pended)   -AL     Moving from lying on back to sitting on the side of a flat bed without bedrails?  --  4  (Pended)   -AL     Moving to and from a bed to a chair (including a wheelchair)?  --  4  (Pended)   -AL     Standing up from a chair using your arms (e.g., wheelchair, bedside chair)?  --  4  (Pended)   -AL     Climbing 3-5 steps with a railing?  --  3  (Pended)   -AL     To walk in hospital room?  --  3  (Pended)   -AL     AM-PAC 6 Clicks Score  --  22  -DB (r) AL (t)        How much help from another is currently needed...    Putting on and taking off regular lower body clothing?  4  (Pended)   -  --     Bathing (including washing, rinsing, and drying)  3  (Pended)   -  --      Toileting (which includes using toilet bed pan or urinal)  4  (Pended)   -  --     Putting on and taking off regular upper body clothing  4  (Pended)   -  --     Taking care of personal grooming (such as brushing teeth)  4  (Pended)   -  --     Eating meals  4  (Pended)   -  --     Score  23  (Pended)   -  --        Functional Assessment    Outcome Measure Options  AM-PAC 6 Clicks Daily Activity (OT)  (Pended)   -  AM-PAC 6 Clicks Basic Mobility (PT)  (Pended)   -AL       User Key  (r) = Recorded By, (t) = Taken By, (c) = Cosigned By    Initials Name Provider Type    DB Isabella Cárdenas, RNA Registered Nurse     Colby Marti, OT Student OT Student    Brandon Silver, PT Student PT Student          Time Calculation:   Time Calculation- OT     Row Name 06/04/19 1016             Time Calculation-     OT Start Time  0756  (Pended)   -      OT Stop Time  0849  (Pended)   -      OT Time Calculation (min)  53 min  (Pended)   -      OT Received On  06/04/19  (Pended)   -        User Key  (r) = Recorded By, (t) = Taken By, (c) = Cosigned By    Initials Name Provider Type     Colby Marti, OT Student OT Student        Therapy Suggested Charges     Code   Minutes Charges    None           Therapy Charges for Today     Code Description Service Date Service Provider Modifiers Qty    [id number]  OT EVAL LOW COMPLEXITY 4 6/4/2019 Colby Marti, OT Student GO 1               OT Discharge Summary  Anticipated Discharge Disposition (OT): (P) other (see comments), home with assist(pending on fx/l decline with chemotherapy. )    Colby Marti OT Student  6/4/2019

## 2019-06-04 NOTE — CONSULTS
Neurology Consult Note    Patient:  Lukasz Savage   YOB: 1965  MRN:  [id number]  Date of Admission:  6/3/2019  5:58 PM    Date: 6/3/2019    Referring Provider: Delio Crowe MD   Reason for Consultation: focal partial seizures, on 2 agents      History of present illness:     This is a 54 y.o. 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  Upon his arrival to ED he presented with left arm weakness.  He is on Depakote and Keppra. His Valproate level was 73, magnesium level was 2.1.     He used to drink significantly and in fact patient first began having seizures in 7/3/2017 and was thought to have PRES due to hypertension on MRI and during that hospitalization he went through alcohol withdrawal as well as DT's His daughter at that time reported he drank a case of beer a day.He was started on Keppra at that time which was later weaned off of in September 2017. .  He had a F/u MRI on 8/29/2017 which showed reversal of PRES and no sign of brain tumor by report.   He had no further seizures until February of 2019 when he developed left facial with left upper extremity twitching and restarted on Keppra but he also noted speech difficulties and concentration problems. He was found to have an abnormal MRI in April 2019 with a right frontal lobe lesion as well as a second lesion subcortical white matter of the paramedian posterior right  frontal lobe and underwent a right frontal stereotactic brain biopsy on April 15 2019 with pathology showing grade 3 anaplastic astrocytoma.     He was seen by Dr Minor for radiation treatments and Dr Trevino neurosurgery who performed the biopsy.    He has been followed in Neurology by Galen Todd PA-C and he was placed back on Keppra increased to 1500 mg BID as seizures continued   He was placed on Aptiom 800 mg and then increased to  1600 mg but seizures continued and then on Depakote 500 mg BID and increased on 5/31/2019 to 1000 mg BID and apparently off of Aptiom  Wife complained patient slept most of the day and still had seizures of left facial twitching and left arm twitching.  He noticed that for past 3 days his left arm was weak and he woke up today with left arm weakness.     Patient also has migraine headaches from review of records.     Currently patient states his last seizure was 1.5 hours ago and lasted 3 minutes consisting of left facial and arm twitching    Patient has had 3 EEGs since last July:   1. EEG 3/27/19:  Interpretation:  This is a normal awake  EEG    2. EEG 10/3/2017:  Findings: Normal awake EEG  3. EEG 7/10/2017  This is an abnormal EEG recording secondary to slowing of the background rhythm.  This could be consistent with a metabolic or medication related encephalopathy.  No definitive seizure activity was noted.  Clinical correlation is recommended.    Past Medical History:   Diagnosis Date   • Angio-edema 7/3/2017   • Anxiety    • Arthritis    • Cancer (CMS/[facility]) 05/09/2019    Brain cancer diagnoised yesterday  Dr Trevino    • Clostridium difficile colitis    • Erectile dysfunction 10/6/2016   • GERD (gastroesophageal reflux disease)    • Gout    • HCAP (healthcare-associated pneumonia) 7/11/2017   • Hyperlipidemia    • Malignant hypertension    • MSSA (methicillin susceptible Staphylococcus aureus) infection 7/31/2017   • Obstructive sleep apnea    • Seizures (CMS/[facility]) 7/4/2017       Past Surgical History:   Procedure Laterality Date   • COLONOSCOPY     • CRANIOTOMY Right 4/15/2019    Procedure: CRANIOTOMY WITH BIOPSY RIGHT;  Surgeon: Dillon Trevino MD;  Location: [facility] OR;  Service: Neurosurgery   • SHOULDER ARTHROSCOPY Left    • TRACHEOSTOMY N/A 7/12/2017    Procedure: TRACHEOSTOMY WITH TRACHEOSCOPY;  Surgeon: Jairon Pierson MD;  Location: [facility] OR;  Service:        Prior to Admission medications     Medication Sig Start Date End Date Taking? Authorizing Provider   allopurinol (ZYLOPRIM) 100 MG tablet Take 100 mg by mouth Daily.   Yes Phil Constantino MD   ALPRAZolam (XANAX) 0.25 MG tablet Take 1 tablet by mouth 2 (Two) Times a Day As Needed for Anxiety. 5/20/19  Yes Linda Hoffman DNP, APRN   aspirin 81 MG tablet Take 1 tablet by mouth Daily. 2/28/19  Yes Royal Todd PA   carvedilol (COREG) 25 MG tablet Take 1 tablet by mouth 2 (Two) Times a Day With Meals. 5/20/19  Yes Linda Hoffman DNP, APRN   CloNIDine (CATAPRES) 0.1 MG tablet Take 1 tablet by mouth Every Night. Check BP 4x daily. If> 180 SBP take x1. 5/20/19  Yes Linda Hoffman DNP, APRN   colchicine 0.6 MG tablet Take 2 tabs now, repeat 1 tab in an hour.  May repeat same steps again in 24 hours if needed.  He has chronic gout 11/20/18  Yes Linda Hoffman DNP, APRN   cyclobenzaprine (FLEXERIL) 10 MG tablet Take 1 tablet by mouth 3 (Three) Times a Day As Needed for Muscle Spasms. 5/17/19  Yes Oral Jessica III, MD   dexamethasone (DECADRON) 1 MG tablet  5/1/19  Yes ProviderPhil MD   divalproex (DEPAKOTE) 500 MG DR tablet Take 2 tablets by mouth 2 (Two) Times a Day. 5/31/19  Yes Royal Todd PA   fenofibrate (TRICOR) 145 MG tablet Take 1 tablet by mouth Daily. 5/20/19  Yes Linda Hoffman DNP, APRN   furosemide (LASIX) 20 MG tablet TAKE ONE TABLET BY MOUTH DAILY  Patient taking differently: prn 12/17/18  Yes Linda Hoffman DNP, APRN   levETIRAcetam (KEPPRA) 750 MG tablet Take 2 tablets by mouth 2 (Two) Times a Day. 4/16/19  Yes Tommie Thornton APRN   losartan (COZAAR) 100 MG tablet TAKE ONE TABLET BY MOUTH DAILY FOR HIGH BLOOD PRESSURE 4/29/19  Yes Dalton, Crys, DNP, APRN   oxyCODONE-acetaminophen (PERCOCET)  MG per tablet Take 1 tablet by mouth Every 4 (Four) Hours As Needed for Moderate Pain . 5/17/19  Yes Oral Jessica III, MD   QUEtiapine (SEROquel) 100 MG tablet  Take 1 tablet by mouth every night at bedtime. 2/22/19  Yes Linda Hoffman DNP, APRN   spironolactone (ALDACTONE) 25 MG tablet TAKE ONE TABLET BY MOUTH DAILY 4/29/19  Yes Linda Hoffman DNP, APRN   venlafaxine XR (EFFEXOR-XR) 150 MG 24 hr capsule Take 1 capsule by mouth Daily. 5/20/19  Yes Linda Hoffman DNP, APRN   vitamin B-6 (PYRIDOXINE) 100 MG tablet Take 1 tablet by mouth Daily. 4/3/19  Yes Royal Todd PA   levETIRAcetam (KEPPRA) 500 MG tablet  5/1/19   Provider, MD Phil   Omega-3 Fatty Acids (FISH OIL) 1000 MG capsule capsule Take 2 capsules by mouth Daily With Breakfast. 5/20/19   Linda Hoffman DNP, APRN   omeprazole (PRILOSEC) 10 MG capsule Take 1 capsule by mouth Daily. For acid reflux 5/20/19   Linda Hoffman DNP, APRN       Hospital scheduled medications:     [START ON 6/4/2019] allopurinol 100 mg Oral Daily   [START ON 6/4/2019] aspirin 81 mg Oral Daily   carvedilol 25 mg Oral BID With Meals   CloNIDine 0.1 mg Oral Nightly   [START ON 6/4/2019] colchicine 0.6 mg Oral Daily   [START ON 6/4/2019] dexamethasone 4 mg Intravenous Q6H   divalproex 1,000 mg Oral BID   [START ON 6/4/2019] furosemide 20 mg Oral Daily   heparin (porcine) 5,000 Units Subcutaneous Q12H   levETIRAcetam 1,500 mg Oral Q12H   [START ON 6/4/2019] losartan 100 mg Oral Daily   [START ON 6/4/2019] pantoprazole 40 mg Oral Daily   QUEtiapine 100 mg Oral Nightly   sodium chloride 3 mL Intravenous Q12H   [START ON 6/4/2019] spironolactone 25 mg Oral Daily   [START ON 6/4/2019] venlafaxine  mg Oral Daily   [START ON 6/4/2019] vitamin B-6 100 mg Oral Daily     Hospital PRN medications:  ALPRAZolam  •  cyclobenzaprine  •  oxyCODONE-acetaminophen  •  sodium chloride    Allergies   Allergen Reactions   • Lipitor [Atorvastatin] Rash   • Lisinopril Angioedema     Swell tongue       Social History     Socioeconomic History   • Marital status: Single     Spouse name: Not on file   • Number of children:  2   • Years of education: Not on file   • Highest education level: Not on file   Occupational History   • Occupation: ELECTRIAL WORK   Tobacco Use   • Smoking status: Former Smoker     Packs/day: 0.33     Years: 30.00     Pack years: 9.90     Types: Cigarettes     Last attempt to quit: 7/3/2017     Years since quittin.9   • Smokeless tobacco: Never Used   Substance and Sexual Activity   • Alcohol use: No     Frequency: Never     Comment: used to drink alot but now just ocasional beer since has seizure in 2017   • Drug use: No   • Sexual activity: Defer     Family History   Problem Relation Age of Onset   • Hypertension Mother    • Diabetes Mother    • Heart disease Father    • Hypertension Father    • No Known Problems Daughter    • No Known Problems Son    • Diabetes Maternal Grandmother    • No Known Problems Maternal Grandfather    • No Known Problems Paternal Grandmother    • Heart attack Paternal Grandfather    • Kidney disease Sister        Review of Systems  A 14 point review of systems was reviewed and was negative except for weakness of left arm  He denies headache    Vital Signs   Temp:  [97.8 °F (36.6 °C)-97.9 °F (36.6 °C)] 97.8 °F (36.6 °C)  Heart Rate:  [75-89] 78  Resp:  [18] 18  BP: (118-143)/(85-98) 143/90    General Exam:  Head:  Normal cephalic,-  HEENT:  Neck supple  CVS:  Regular rate and rhythm.  No murmurs  Carotid Examination:  No bruits  Lungs:  Clear to auscultation  Abdomen:  Non-tender, Non-distended  Extremities:  No signs of peripheral edema  Skin:  No rashes    Neurologic Exam:    Mental Status:    -Awake, Alert, Oriented  To person, place.  Initially thought it was may then corrected to Edyta 3, 2019. Thought it was  or Monday (it's Monday) Knew it was Spring and that the president was [employer]. -No word finding difficulties  -No aphasia  -No dysarthria  -Follows simple and complex commands    CN II:  Visual fields full.  Pupils equally reactive to light  CN III, IV, VI:   Extraocular Muscles full with no signs of nystagmus  CN V:  Facial sensory is symmetric   CN VII:  Facial motor asymmetric smile in that the right side will lag behind the left side but otherwise smile is symmetric.   CN VIII:  Gross hearing intact bilaterally  CN IX/X:  Palate elevates symmetrically  CN XI:  Shoulder shrug symmetric  CN XII:  Tongue is midline on protrusion    Motor: (strength out of 5:  1= minimal movement, 2 = movement in plane of gravity, 3 = movement against gravity, 4 = movement against some resistance, 5 = full strength)    -Right Upper Ext: Proximal: 5 Distal: 5  -Left Upper Ext: Proximal: 5 Distal: wrist flex/extension 5/5 but interossei and hand  is weak--3-    -Right Lower Ext: Proximal: 5 Distal: 5  -Left Lower Ext: Proximal: 5 Distal: 5    DTR:  -Right   Bicep: 2+ Triceps: 2+ Brachioradialis: 2+   Patella: 2+ Ankle: 2+ Neg Babinski  -Left   Bicep: 3+ Triceps: 3+ Brachioradialis: 3+   Patella: 3+ Ankle: 2+ Neg Babinski    Sensory:  -Intact to light touch, pinprick    Coordination:  -Finger to nose intact  -Heel to shin intact  Fine finger movement--none on the left and normal on the right  -No ataxia    Gait  -Not tested for safety reasons      Results Review:  Lab Results (last 7 days)     Procedure Component Value Units Date/Time    Valproic Acid Level, Total [[id number]]  (Normal) Collected:  06/03/19 1843    Specimen:  Blood Updated:  06/03/19 1913     Valproic Acid 73.7 mcg/mL     Comprehensive Metabolic Panel [[id number]]  (Abnormal) Collected:  06/03/19 1843    Specimen:  Blood Updated:  06/03/19 1910     Glucose 101 mg/dL      BUN 34 mg/dL      Creatinine 1.51 mg/dL      Sodium 143 mmol/L      Potassium 4.1 mmol/L      Chloride 109 mmol/L      CO2 23.0 mmol/L      Calcium 9.8 mg/dL      Total Protein 7.2 g/dL      Albumin 4.10 g/dL      ALT (SGPT) 26 U/L      AST (SGOT) 26 U/L      Alkaline Phosphatase 66 U/L      Total Bilirubin 0.4 mg/dL      eGFR Non  Amer 49  mL/min/1.73      Globulin 3.1 gm/dL      A/G Ratio 1.3 g/dL      BUN/Creatinine Ratio 22.5     Anion Gap 11.0 mmol/L     Narrative:       GFR Normal >60  Chronic Kidney Disease <60  Kidney Failure <15    Magnesium [[id number]]  (Normal) Collected:  06/03/19 1843    Specimen:  Blood Updated:  06/03/19 1910     Magnesium 2.1 mg/dL     Protime-INR [[id number]]  (Normal) Collected:  06/03/19 1843    Specimen:  Blood Updated:  06/03/19 1907     Protime 13.3 Seconds      INR 0.98    CBC & Differential [[id number]] Collected:  06/03/19 1843    Specimen:  Blood Updated:  06/03/19 1858    Narrative:       The following orders were created for panel order CBC & Differential.  Procedure                               Abnormality         Status                     ---------                               -----------         ------                     CBC Auto Differential[[id number]]        Abnormal            Final result                 Please view results for these tests on the individual orders.    CBC Auto Differential [[id number]]  (Abnormal) Collected:  06/03/19 1843    Specimen:  Blood Updated:  06/03/19 1858     WBC 11.39 10*3/mm3      RBC 5.00 10*6/mm3      Hemoglobin 14.3 g/dL      Hematocrit 42.4 %      MCV 84.8 fL      MCH 28.6 pg      MCHC 33.7 g/dL      RDW 15.3 %      RDW-SD 47.6 fl      MPV 9.3 fL      Platelets 390 10*3/mm3      Neutrophil % 70.2 %      Lymphocyte % 23.1 %      Monocyte % 5.7 %      Eosinophil % 0.2 %      Basophil % 0.3 %      Immature Grans % 0.5 %      Neutrophils, Absolute 8.00 10*3/mm3      Lymphocytes, Absolute 2.63 10*3/mm3      Monocytes, Absolute 0.65 10*3/mm3      Eosinophils, Absolute 0.02 10*3/mm3      Basophils, Absolute 0.03 10*3/mm3      Immature Grans, Absolute 0.06 10*3/mm3      nRBC 0.0 /100 WBC           .  Imaging Results (last 24 hours)     Procedure Component Value Units Date/Time    CT Head Without Contrast [[id number]] Collected:  06/03/19 1925     Updated:  06/03/19 1934     Narrative:       EXAMINATION: CT HEAD WO CONTRAST- 6/3/2019 7:25 PM CDT     HISTORY: Bilateral upper extremity weakness, history of brain tumor  (anaplastic infiltrating glioma) and previous right-sided frontal  craniotomy.     DOSE: 633 mGycm (Automatic exposure control technique was implemented in  an effort to keep the radiation dose as low as possible without  compromising image quality)     REPORT: Spiral CT of the head was performed without contrast,  reconstructed coronal and sagittal images are also reviewed.     COMPARISON: CT the head without contrast 04/15/2019, MRI of the brain  with without contrast 04/02/2019.     There is evidence of previous right craniotomy, with resolution of a  small amount of pneumocephalus is seen on the previous head CT. There  are 2 ill-defined masslike areas of increased attenuation within the  right frontal lobe, largest is medially and abuts the falx, just  superior to the right frontal horn. This measures approximately 3.3 cm,  appears to significantly increase in size compared with the previous  head CT, when it measured approximately 1.8 cm. This mass appears to  involve the corpus callosum on the right and has mild mass effect on the  frontal horn. There are areas of increased attenuation is in the lateral  aspect of the right frontal lobe and is ill-defined, measuring up to 1.5  cm. This also appears slightly increased compared with the previous CT.  The ventricles and basal cisterns are within normal limits and there is  no hydrocephalus. No intracranial hemorrhage is identified. There is no  midline shift or evidence of tonsillar or uncal herniation.       Impression:       1. Evidence of previous right craniotomy for biopsy of 2 right frontal  masses which appear increased in size compared with the previous  examinations, concerning for tumor progression. No intracranial  hemorrhage or midline shift is identified.     This report was finalized on 06/03/2019 19:31 by  Dr. Marvin Mar MD.              Impression    1. Focal motor seizures of left upper and left face  2. Grade 3 anaplastic astrocytoma  3. Left upper extremity/left facial  weakness may be Melchor's or extension of tumor.  3. Untreated sleep apnea ---may lower seizure threshold.     Plan    · Agree with EEG--obtain at 7 AM   · Add IV Vimpat for tonight--will need to determine if his insurance will cover this   · Obtain Keppra level  · Daily valproate levels  · UDS  · ETOH level      I discussed the patients findings and my recommendations with patient and nursing staff    Doreen Parker MD  06/03/19  10:09 PM

## 2019-06-04 NOTE — THERAPY DISCHARGE NOTE
Acute Care - Speech Language Pathology Initial Eval/Discharge  [facility]     Patient Name: Lukasz Savage  : 1965  MRN: [id number]  Today's Date: 2019  Onset of Illness/Injury or Date of Surgery: (P) 19     Referring Physician: (P) Dr. Crowe      Admit Date: 6/3/2019   Speech/Language/Cognitive evaluation completed. Consulted due to slurred speech s/p seizure activity. 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 Patient displayed no cognitive or speech deficits upon evaluation on this date and time. His attention to task was functional. His mother was present and tells me that his affect and personality is also baseline. SLP discussed possible future changes in cognition due to area of mass. His mother verbalizes concerns with not being able to understand him at baseline due to mumbled speech. ST advised patient to over articulate and speak loudly when he is not understood by his mother or others. Patient does not present with an impairment that warrants further speech services. SLP to d/c at this time. If concerns arise please reconsult.   Silvina Lake, MS CCC-SLP 2019 10:50 AM    Visit Dx:    ICD-10-CM ICD-9-CM   1. Benign neoplasm of brain, unspecified brain region (CMS/HCC) D33.2 225.0   2. Left arm weakness R29.898 729.89   3. Decreased activities of daily living (ADL) R68.89 780.99   4. Slurred speech R47.81 784.59     Patient Active Problem List   Diagnosis   • Hyperlipidemia   • HTN (hypertension)   • Gout   • Anxiety   • Arthritis   • Erectile dysfunction   • Angio-edema   • Seizures (CMS/HCC)   • HCAP (healthcare-associated pneumonia)   • MSSA (methicillin susceptible Staphylococcus aureus) infection   • Foot deformity   • Chronic gout of right foot   • Peripheral edema   • S/P shoulder surgery   • Claudication (CMS/HCC)   • Cigarette smoker   • BMI 30.0-30.9,adult   • BMI 33.0-33.9,adult   •  Astrocytoma brain tumor (CMS/HCC)   • Encounter for consultation   • Benign neoplasm of brain (CMS/HCC)     Past Medical History:   Diagnosis Date   • Angio-edema 7/3/2017   • Anxiety    • Arthritis    • Cancer (CMS/HCC) 05/09/2019    Brain cancer diagnoised yesterday  Dr Trevino    • Clostridium difficile colitis    • Erectile dysfunction 10/6/2016   • GERD (gastroesophageal reflux disease)    • Gout    • HCAP (healthcare-associated pneumonia) 7/11/2017   • Hyperlipidemia    • Malignant hypertension    • MSSA (methicillin susceptible Staphylococcus aureus) infection 7/31/2017   • Obstructive sleep apnea    • Seizures (CMS/HCC) 7/4/2017     Past Surgical History:   Procedure Laterality Date   • COLONOSCOPY     • CRANIOTOMY Right 4/15/2019    Procedure: CRANIOTOMY WITH BIOPSY RIGHT;  Surgeon: Dillon Trevino MD;  Location: [facility] OR;  Service: Neurosurgery   • SHOULDER ARTHROSCOPY Left    • TRACHEOSTOMY N/A 7/12/2017    Procedure: TRACHEOSTOMY WITH TRACHEOSCOPY;  Surgeon: Jairon Pierson MD;  Location:  [facility] OR;  Service:           SLP EVALUATION (last 72 hours)      SLP SLC Evaluation     Row Name 06/04/19 0988                   Communication Assessment/Intervention    Document Type  evaluation;discharge evaluation/summary  -MM        Subjective Information  no complaints  -MM        Patient Observations  alert;cooperative;agree to therapy  -MM        Patient/Family Observations  Mother present.  -MM        Patient Effort  good  -MM        Symptoms Noted During/After Treatment  none  -MM           General Information    Patient Profile Reviewed  yes  -MM        Pertinent History Of Current Problem  Right frontal and subcortical white matter of paramedian right frontal lobe Grade 3 anaplastic astrocytoma, not amendable to resection, seizures   -MM        Precautions/Limitations, Vision  WFL;for purposes of eval  -MM        Precautions/Limitations, Hearing  WFL;for purposes of eval  -MM        Patient Level of  Education  Did not assess  -MM        Prior Level of Function-Communication  WFL  -MM        Plans/Goals Discussed with  patient and family;agreed upon  -MM        Barriers to Rehab  none identified  -MM        Patient's Goals for Discharge  return to all previous roles/activities  -MM        Family Goals for Discharge  patient able to return to previous activities/roles  -MM           Pain Assessment    Additional Documentation  Pain Scale: FACES Pre/Post-Treatment (Group)  -MM           Pain Scale: FACES Pre/Post-Treatment    Pain: FACES Scale, Pretreatment  0-->no hurt  -MM        Pain: FACES Scale, Post-Treatment  0-->no hurt  -MM           Comprehension Assessment/Intervention    Comprehension Assessment/Intervention  Auditory Comprehension  -MM           Auditory Comprehension Assessment/Intervention    Auditory Comprehension (Communication)  WFL  -MM        Able to Identify Objects/Pictures (Communication)  WFL  -MM        Answers Questions (Communication)  WFL  -MM        Able to Follow Commands (Communication)  WFL  -MM        Narrative Discourse  WFL  -MM        Successful Auditory Strategies (Communication)  decrease environmental distractions  -MM           Expression Assessment/Intervention    Expression Assessment/Intervention  verbal expression  -MM           Verbal Expression Assessment/Intervention    Verbal Expression  WFL  -MM        Automatic Speech (Communication)  WFL  -MM        Phrase Completion  WFL  -MM        Confrontational Naming  WFL  -MM        Sentence Formulation  WFL  -MM        Conversational Discourse/Fluency  WFL  -MM           Oral Motor Structure and Function    Oral Motor Structure and Function  WFL  -MM           Oral Musculature and Cranial Nerve Assessment    Oral Motor General Assessment  WFL  -MM           Motor Speech Assessment/Intervention    Motor Speech Function  WFL  -MM           Cursory Voice Assessment/Intervention    Quality and Resonance (Voice)  WFL  -MM            Cognitive Assessment Intervention- SLP    Cognitive Function (Cognition)  WFL  -MM        Orientation Status (Cognition)  WFL  -MM        Memory (Cognitive)  WFL  -MM        Attention (Cognitive)  WFL  -MM        Thought Organization (Cognitive)  WFL  -MM        Reasoning (Cognitive)  WFL  -MM        Problem Solving (Cognitive)  WFL  -MM        Functional Math (Cognitive)  WFL  -MM        Executive Function (Cognition)  WFL  -MM        Pragmatics (Communication)  WFL  -MM        Right Hemisphere Function  WFL  -MM           SLP Clinical Impressions    SLP Diagnosis  Slurred speech secondary to seizure  -MM        Rehab Potential/Prognosis  guarded  -MM        SLC Criteria for Skilled Therapy Interventions Met  no problems identified which require skilled intervention  -MM        Functional Impact  no impact on function  -MM           Recommendations    [place]rapy Frequency ([place] [facility] [place]uation only  -MM        Predicted Duration Therapy Intervention (Days)  until discharge  -MM        Anticipated Dischage Disposition  home  -          User Key  (r) = Recorded By, (t) = Taken By, (c) = Cosigned By    Initials Name Effective Dates    MM Silvina Lake, MS CCC-SLP 05/24/19 -            EDUCATION  The patient has been educated in the following areas:   Cognitive Impairment Communication Impairment.      SLP Recommendation and Plan  SLP Diagnosis: Slurred speech secondary to seizure     SLC Criteria for Skilled Therapy Interventions Met: no problems identified which require skilled intervention  SLP Diagnosis: Slurred speech secondary to seizure  Anticipated Dischage Disposition: home     Predicted Duration Therapy Intervention (Days): until discharge    Plan of Care Reviewed With: patient, mother  Plan of Care Review  Plan of Care Reviewed With: patient, mother  Progress: no change(Initial Evaluation )  Outcome Summary: Speech/Language/Cognitive evaluation completed. Consulted due to slurred speech s/p  seizure activity. 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 Patient displayed no cognitive or speech deficits upon evaluation on this date and time. His attention to task was functional. His mother was present and tells me that his affect and personality is also baseline. SLP discussed possible future changes in cognition due to area of mass. His mother verbalizes concerns with not being able to understand him at baseline due to mumbled speech. ST advised patient to over articulate and speak loudly when he is not understood by his mother or others. Patient does not present with an impairment that warrants further speech services. SLP to d/c at this time. If concerns arise please reconsult.               Time Calculation:   Time Calculation- SLP     Row Name 06/04/19 1049             Time Calculation- SLP    SLP Start Time  0950  -MM      SLP Stop Time  1049  -MM      SLP Time Calculation (min)  59 min  -MM      SLP Received On  06/04/19  -MM        User Key  (r) = Recorded By, (t) = Taken By, (c) = Cosigned By    Initials Name Provider Type    Silvina Lopez MS CCC-SLP Speech and Language Pathologist          Therapy Charges for Today     Code Description Service Date Service Provider Modifiers Qty    [id number] [facility] EVAL SPEECH AND PROD W LANG  4 6/4/2019 Silvina Lake MS CCC-SLP GN 1                   SLP Discharge Summary  Anticipated Dischage Disposition: home  Reason for Discharge: other (see comments)(Evaluation only )  Progress Toward Achieving Short/long Term Goals: other (see comments)(Evaluation only )  Discharge Destination: other (see comments)(Patient remains in acute care)    Silvina Lake MS CCC-SLP  6/4/2019

## 2019-06-04 NOTE — PLAN OF CARE
Problem: Patient Care Overview  Goal: Plan of Care Review  Outcome: Ongoing (interventions implemented as appropriate)   06/04/19 0334   Coping/Psychosocial   Plan of Care Reviewed With patient   Plan of Care Review   Progress no change   OTHER   Outcome Summary pt alert and oriented x4, NIH =2, left  < right , denies N/T, ambulaates well with stand-by assistance. VSS, safety maintained, will continue to monitor.       Problem: Fall Risk (Adult)  Goal: Identify Related Risk Factors and Signs and Symptoms  Outcome: Outcome(s) achieved Date Met: 06/04/19    Goal: Absence of Fall  Outcome: Ongoing (interventions implemented as appropriate)      Problem: Pain, Chronic (Adult)  Goal: Identify Related Risk Factors and Signs and Symptoms  Outcome: Outcome(s) achieved Date Met: 06/04/19    Goal: Acceptable Pain/Comfort Level and Functional Ability  Outcome: Ongoing (interventions implemented as appropriate)

## 2019-06-04 NOTE — PLAN OF CARE
Problem: Patient Care Overview  Goal: Plan of Care Review  Outcome: Ongoing (interventions implemented as appropriate)   06/04/19 1611   Coping/Psychosocial   Plan of Care Reviewed With patient   Plan of Care Review   Progress improving   OTHER   Outcome Summary A&O X4. Slurred speech. No neuro changes. Safety maintained. Family at bedside. Some c/o back pain, medication offered but not needed. Will continue to monitor.      Goal: Individualization and Mutuality  Outcome: Ongoing (interventions implemented as appropriate)    Goal: Discharge Needs Assessment  Outcome: Ongoing (interventions implemented as appropriate)    Goal: Interprofessional Rounds/Family Conf  Outcome: Ongoing (interventions implemented as appropriate)      Problem: Fall Risk (Adult)  Goal: Absence of Fall  Outcome: Ongoing (interventions implemented as appropriate)      Problem: Pain, Chronic (Adult)  Goal: Acceptable Pain/Comfort Level and Functional Ability  Outcome: Ongoing (interventions implemented as appropriate)

## 2019-06-04 NOTE — ED PROVIDER NOTES
Subjective   54-year-old male presenting to the emergency department with left arm weakness.  Patient has a history of brain cancer which was recently diagnosed as a high-grade astrocytoma.  Patient states that he has been having increased seizure-like activity and then yesterday he noted to have left arm weakness.  Family states that is substantially worse today.  Patient denies any fevers chills headaches or any other visual symptoms.  Patient denies any other motor dysfunction.            Review of Systems   HENT: Negative.    Eyes: Negative.    Respiratory: Negative.    Cardiovascular: Negative.    Gastrointestinal: Negative.    Endocrine: Negative.    Genitourinary: Negative.    Musculoskeletal: Negative.    Skin: Negative.    Allergic/Immunologic: Negative.    Neurological: Positive for seizures and weakness.   Hematological: Negative.    Psychiatric/Behavioral: Negative.    All other systems reviewed and are negative.      Past Medical History:   Diagnosis Date   • Angio-edema 7/3/2017   • Anxiety    • Arthritis    • Cancer (CMS/HCC) 05/09/2019    Brain cancer diagnoised yesterday  Dr Trevino    • Clostridium difficile colitis    • Erectile dysfunction 10/6/2016   • GERD (gastroesophageal reflux disease)    • Gout    • HCAP (healthcare-associated pneumonia) 7/11/2017   • Hyperlipidemia    • Malignant hypertension    • MSSA (methicillin susceptible Staphylococcus aureus) infection 7/31/2017   • Obstructive sleep apnea    • Seizures (CMS/HCC) 7/4/2017       Allergies   Allergen Reactions   • Lipitor [Atorvastatin] Rash   • Lisinopril Angioedema     Swell tongue       Past Surgical History:   Procedure Laterality Date   • COLONOSCOPY     • CRANIOTOMY Right 4/15/2019    Procedure: CRANIOTOMY WITH BIOPSY RIGHT;  Surgeon: Dillon Trevino MD;  Location: [facility];  Service: Neurosurgery   • SHOULDER ARTHROSCOPY Left    • TRACHEOSTOMY N/A 7/12/2017    Procedure: TRACHEOSTOMY WITH TRACHEOSCOPY;  Surgeon: Jairon CATALAN  MD Dangelo;  Location: [facility] OR;  Service:        Family History   Problem Relation Age of Onset   • Hypertension Mother    • Diabetes Mother    • Heart disease Father    • Hypertension Father    • No Known Problems Daughter    • No Known Problems Son    • Diabetes Maternal Grandmother    • No Known Problems Maternal Grandfather    • No Known Problems Paternal Grandmother    • Heart attack Paternal Grandfather    • Kidney disease Sister        Social History     Socioeconomic History   • Marital status: Single     Spouse name: Not on file   • Number of children: 2   • Years of education: Not on file   • Highest education level: Not on file   Occupational History   • Occupation: ELECTRIAL WORK   Tobacco Use   • Smoking status: Former Smoker     Packs/day: 0.33     Years: 30.00     Pack years: 9.90     Types: Cigarettes     Last attempt to quit: 7/3/2017     Years since quittin.9   • Smokeless tobacco: Never Used   Substance and Sexual Activity   • Alcohol use: Yes     Comment: used to drink alot but now just ocasional beer since has seizure in 2017   • Drug use: No   • Sexual activity: Defer           Objective   Physical Exam   Constitutional: He is oriented to person, place, and time. He appears well-developed and well-nourished.   HENT:   Head: Normocephalic and atraumatic.   Nose: Nose normal.   Eyes: EOM are normal. Pupils are equal, round, and reactive to light.   Neck: Normal range of motion. Neck supple.   Cardiovascular: Normal rate, regular rhythm and normal heart sounds.   Pulmonary/Chest: Effort normal and breath sounds normal.   Abdominal: Soft. He exhibits no distension. There is no tenderness. There is no rebound and no guarding.   Musculoskeletal: Normal range of motion.   Neurological: He is alert and oriented to person, place, and time. No cranial nerve deficit or sensory deficit.   Patient has difficulty with left arm flexion extension at the shoulder as well as drift is being appreciated.   Patient has difficulty with shoulder shrug on the left.  Neurologically otherwise patient is intact.   Skin: Skin is warm and dry. Capillary refill takes less than 2 seconds.   Psychiatric: He has a normal mood and affect. His behavior is normal.   Nursing note and vitals reviewed.      Procedures           ED Course  ED Course as of Jun 03 1935   Mon Jun 03, 2019 1934 Patient to be admitted to Dr. Crowe service after discussion with him.  Patient will have an MRI brain with and without contrast as well as an EEG and be started on Decadron.  [AP]      ED Course User Index  [AP] Baljinder Zamarripa MD                  [facility]      Final diagnoses:   Benign neoplasm of brain, unspecified brain region (CMS/HCC)   Left arm weakness            Baljinder Zamarripa MD  06/03/19 1935

## 2019-06-04 NOTE — H&P
NEUROSURGERY INITIAL HOSPITAL ENCOUNTER    Assessment/Plan:   Lukasz Savage is a 54 y.o. male.  He has a medical history of  Malignant hypertension, MSSA, Obstructive sleep apnea, Grade 3 Anaplastic Astrocytoma and Seizures.He presents with a complaint of increased seizure-like activity and progressively worsening slurred speech, left facial weakness, and left upper extremity weakness. Physical exam findings of slurred speech, mild left facial, left upper extremity weakness, and left pronator drift.  MRI shows 2 stable intracranial masses, and no evidence of hemorrhage.    Differential Diagnosis:   Grade 3 Anaplastic Astrocytoma   Seizure disorder    Recommendations:  Grade 3 anaplastic astrocytoma: Imaging reveals stable lesion.  Differential diagnosis for neurological decline is progressive tumor growth versus exacerbation of known focal motor seizures. Admit to the floor for frequent neuro checks.  No indication for Decadron at this time.    Exacerbation of known focal motor seizures.  The patient presents with waxing and waning left upper extremity twitching and worsening facial droop.  This sounds most consistent with exacerbation of his seizures.  At his last visit these were noted to be uncontrolled.  We will obtain levels of Depakote.  The patient has been started on Vimpat by neurology. EEG. Evaluation by speech therapy given slurred speech    I discussed the patients findings and my recommendations with patient and family    Thank you very much for this interesting consult.     Level of Risk: Moderate due to: mild exacerbation of one chronic illness  MDM: Moderate Complexity  Mod = 15045, High=99223  ___________________________________________________________________    Reason for consult:  Increased left facial and upper extremity weakness    Chief Complaint:   Chief Complaint   Patient presents with   • Cancer   • Extremity Weakness     HPI: Lukasz Savage is a 54 y.o. male with a significant  comorbidity Malignant hypertension, MSSA, Obstructive sleep apnea, Grade 3 Anaplastic Astrocytoma and Seizures.  He currently takes Keppra and Depakote as prescribed.  He presented to [facility] emergency room yesterday with with a complaint of increased seizure-like activity and progressively worsening slurred speech, left facial weakness, and left upper extremity weakness.  The patient has a previously biopsy confirmed grade 3 anaplastic astrocytoma by my partner Dr. Trevino.  At his last follow-up appointment he was complaining of worsening seizure-like activity.  This included tremulous and shaking of his left hand and left face.  Despite being on 2 medications these have been getting progressively worse.  Additionally the patient complains of worsening function of his left upper extremity left face.  This is a mixed picture of both a progressive decline which is not surprising given its location and the motor strip as well as intermittent waxing and waning which is more consistent with epileptiform activity.  The patient has not had any loss of consciousness although his mother does state that intermittently he is now staring off and it takes several seconds to get his attention again which suggest some aspect of generalizability.  He has no bowel or bladder incontinence.  He has had no tonic-clonic seizures.  No other signs of infection and his biopsy sites are clean on his head.    Review of Systems   HENT: Negative.    Eyes: Negative.    Respiratory: Negative.    Cardiovascular: Negative.    Gastrointestinal: Negative.    Endocrine: Negative.    Genitourinary: Negative.    Musculoskeletal: Negative.    Skin: Negative.    Allergic/Immunologic: Negative.    Neurological: Positive for seizures and weakness.   Hematological: Negative.    Psychiatric/Behavioral: Negative.    All other systems reviewed and are negative.     Past Medical History:  has a past medical history of Angio-edema (7/3/2017), Anxiety,  Arthritis, Cancer (CMS/[facility]) (05/09/2019), Clostridium difficile colitis, Erectile dysfunction (10/6/2016), GERD (gastroesophageal reflux disease), Gout, HCAP (healthcare-associated pneumonia) (7/11/2017), Hyperlipidemia, Malignant hypertension, MSSA (methicillin susceptible Staphylococcus aureus) infection (7/31/2017), Obstructive sleep apnea, and Seizures (CMS/[facility]) (7/4/2017).    Past Surgical History:  has a past surgical history that includes Tracheostomy tube placement (N/A, 7/12/2017); Shoulder arthroscopy (Left); Colonoscopy; and Craniotomy (Right, 4/15/2019).    Family History: family history includes Diabetes in his maternal grandmother and mother; Heart attack in his paternal grandfather; Heart disease in his father; Hypertension in his father and mother; Kidney disease in his sister; No Known Problems in his daughter, maternal grandfather, paternal grandmother, and son.    Social History:  reports that he quit smoking about 23 months ago. His smoking use included cigarettes. He has a 9.90 pack-year smoking history. He has never used smokeless tobacco. He reports that he does not drink alcohol or use drugs.    Allergies: Lipitor [atorvastatin] and Lisinopril    Home Medications:   Current Facility-Administered Medications:   •  allopurinol (ZYLOPRIM) tablet 100 mg, 100 mg, Oral, Daily, Delio Crowe MD  •  ALPRAZolam (XANAX) tablet 0.25 mg, 0.25 mg, Oral, BID PRN, Delio Crowe MD  •  aspirin chewable tablet 81 mg, 81 mg, Oral, Daily, Delio Crowe MD  •  carvedilol (COREG) tablet 25 mg, 25 mg, Oral, BID With Meals, Delio Crowe MD, 25 mg at 06/03/19 2238  •  CloNIDine (CATAPRES) tablet 0.1 mg, 0.1 mg, Oral, Nightly, Delio Crowe MD, 0.1 mg at 06/03/19 2238  •  colchicine tablet 0.6 mg, 0.6 mg, Oral, Daily, Delio Crowe MD  •  cyclobenzaprine (FLEXERIL) tablet 10 mg, 10 mg, Oral, TID PRN, Delio Crowe MD  •  dexamethasone (DECADRON)  injection 4 mg, 4 mg, Intravenous, Q6H, Baljinder Zamarripa MD, 4 mg at 06/04/19 0640  •  divalproex (DEPAKOTE) DR tablet 1,000 mg, 1,000 mg, Oral, BID, Delio Crowe MD, 1,000 mg at 06/03/19 2238  •  furosemide (LASIX) tablet 20 mg, 20 mg, Oral, Daily, Delio Crowe MD  •  heparin (porcine) 5000 UNIT/ML injection 5,000 Units, 5,000 Units, Subcutaneous, Q12H, Delio Crowe MD, 5,000 Units at 06/03/19 2238  •  lacosamide (VIMPAT) 50 mg in sodium chloride 0.9 % 50 mL IVPB, 50 mg, Intravenous, Q12H, Doreen Zuniga MD, Last Rate: 100 mL/hr at 06/04/19 0029, 50 mg at 06/04/19 0029  •  levETIRAcetam (KEPPRA) tablet 1,500 mg, 1,500 mg, Oral, Q12H, Delio Crowe MD, 1,500 mg at 06/03/19 2238  •  losartan (COZAAR) tablet 100 mg, 100 mg, Oral, Daily, Delio Crowe MD  •  oxyCODONE-acetaminophen (PERCOCET)  MG per tablet 1 tablet, 1 tablet, Oral, Q4H PRN, Delio Crowe MD  •  pantoprazole (PROTONIX) EC tablet 40 mg, 40 mg, Oral, Daily, Delio Crowe MD  •  QUEtiapine (SEROquel) tablet 100 mg, 100 mg, Oral, Nightly, Delio Crowe MD, 100 mg at 06/03/19 2238  •  sodium chloride 0.9 % flush 3 mL, 3 mL, Intravenous, Q12H, Delio Crowe MD, 3 mL at 06/03/19 2238  •  sodium chloride 0.9 % flush 3-10 mL, 3-10 mL, Intravenous, PRN, Delio Crowe MD  •  spironolactone (ALDACTONE) tablet 25 mg, 25 mg, Oral, Daily, Delio Crowe MD  •  venlafaxine XR (EFFEXOR-XR) 24 hr capsule 150 mg, 150 mg, Oral, Daily, Delio Crowe MD  •  vitamin B-6 (PYRIDOXINE) tablet 100 mg, 100 mg, Oral, Daily, Delio Crowe MD    Medications: Scheduled Meds:  allopurinol 100 mg Oral Daily   aspirin 81 mg Oral Daily   carvedilol 25 mg Oral BID With Meals   CloNIDine 0.1 mg Oral Nightly   colchicine 0.6 mg Oral Daily   dexamethasone 4 mg Intravenous Q6H   divalproex 1,000 mg Oral BID   furosemide 20 mg Oral Daily   heparin  (porcine) 5,000 Units Subcutaneous Q12H   lacosamide (VIMPAT) IVPB 50 mg Intravenous Q12H   levETIRAcetam 1,500 mg Oral Q12H   losartan 100 mg Oral Daily   pantoprazole 40 mg Oral Daily   QUEtiapine 100 mg Oral Nightly   sodium chloride 3 mL Intravenous Q12H   spironolactone 25 mg Oral Daily   venlafaxine  mg Oral Daily   vitamin B-6 100 mg Oral Daily     Continuous Infusions:   PRN Meds:.ALPRAZolam  •  cyclobenzaprine  •  oxyCODONE-acetaminophen  •  sodium chloride    Vital Signs  Temp:  [97.8 °F (36.6 °C)-98.2 °F (36.8 °C)] 98.2 °F (36.8 °C)  Heart Rate:  [70-89] 70  Resp:  [18] 18  BP: (113-143)/(71-98) 113/71    Physical Exam  Physical Exam   Constitutional: He is oriented to person, place, and time. He appears well-developed and well-nourished.  Non-toxic appearance. He does not have a sickly appearance. He does not appear ill. No distress.   HENT:   Head: Normocephalic and atraumatic.   Right Ear: Hearing normal.   Left Ear: Hearing normal.   Mouth/Throat: Mucous membranes are normal.   Eyes: Conjunctivae and EOM are normal. Pupils are equal, round, and reactive to light.   Fundoscopic exam:       The right eye shows no exudate, no hemorrhage and no papilledema.        The left eye shows no exudate, no hemorrhage and no papilledema.   Neck: Trachea normal, normal range of motion and full passive range of motion without pain. Neck supple.   Cardiovascular: Normal rate and regular rhythm.   Pulses:       Dorsalis pedis pulses are 2+ on the right side, and 2+ on the left side.        Posterior tibial pulses are 2+ on the right side, and 2+ on the left side.   Pulmonary/Chest: Effort normal. No accessory muscle usage. No apnea, no tachypnea and no bradypnea. No respiratory distress.   Abdominal: Soft. Normal appearance.     Vascular Status -  His right foot exhibits no edema. His left foot exhibits no edema.  Neurological: He is alert and oriented to person, place, and time. He has a normal  Finger-Nose-Finger Test, a normal Heel to Merino Test and a normal Tandem Gait Test. Gait normal.   Skin: Skin is warm, dry and intact. No rash noted. No erythema.   Psychiatric: He has a normal mood and affect. His behavior is normal. His speech is slurred.   Nursing note and vitals reviewed.      Neurologic Exam     Mental Status   Oriented to person, place, and time.   Registration: recalls 3 of 3 objects. Recall at 5 minutes: recalls 3 of 3 objects.   Attention: normal. Concentration: normal.   Speech: slurred   Level of consciousness: alert  Knowledge: consistent with education.     Cranial Nerves     CN II   Visual acuity: normal    CN III, IV, VI   Pupils are equal, round, and reactive to light.  Extraocular motions are normal.   Diplopia: none    CN V   Facial sensation intact.   Right corneal reflex: normal  Left corneal reflex: normal    CN VII   Right facial weakness: none  Left facial weakness: central    CN VIII   Hearing: intact    CN IX, X   Palate: symmetric  Right gag reflex: normal  Left gag reflex: normal    CN XI   Right trapezius strength: normal  Left trapezius strength: normal    CN XII   CN XII normal.   Tongue deviation: none    Motor Exam   Muscle bulk: normal  Overall muscle tone: normal  Right arm tone: normal  Left arm tone: normal  Right arm pronator drift: absent  Left arm pronator drift: present  Right leg tone: normal  Left leg tone: normal    Strength   Strength 5/5 except as noted.   Left deltoid: 4/5  Left biceps: 4/5  Left triceps: 4/5    Sensory Exam   Light touch normal.   Proprioception normal.     Gait, Coordination, and Reflexes     Gait  Gait: normal    Coordination   Finger to nose coordination: normal  Heel to shin coordination: normal  Tandem walking coordination: normal    Tremor   Resting tremor: present  Intention tremor: present  Action tremor: absent    Reflexes   Reflexes 2+ except as noted.   Right plantar: normal  Left plantar: normal  Right Koenig: absent  Left  Arya: absent    Results Review:   Independent review and interpretation of imaging  Imaging Results (last 24 hours)     Procedure Component Value Units Date/Time    MRI Brain With & Without Contrast [[id number]] Collected:  06/04/19 0755     Updated:  06/04/19 0815    Narrative:       EXAM: MRI BRAIN W WO CONTRAST- - 6/3/2019 9:49 PM CDT     HISTORY: new L arm weakness; D33.2-Benign neoplasm of brain,  unspecified; R29.898-Other symptoms and signs involving the  musculoskeletal system. 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     COMPARISON: 06/03/2019.      TECHNIQUE: Routine protocol MRI performed of the brain without and with  intravenous contrast.     FINDINGS:  There is a masslike appearance at the right frontal lobe, parafalcine  location measuring about 2.9 x 1.4 cm, previously 1.7 x 1.1 cm, with  mass effect and possible extension into the corpus callosum. The  aforementioned mass does efface the adjacent sulci, but there is no  significant midline shift. The second previously described larger area  has indistinct borders and is difficult to measure, appearing similar to  slightly increased in size and involves the right motor cortex. There is  associated faint enhancement and mild restricted diffusion. The mass  lesions do not appear to cross midline. There is some questionable  precontrast T1 hyperintensity such as axial series serial one image 1  22-99. This could represent hemorrhage/necrosis,  postbiopsy/posttreatment changes or possibly artifact. On T2 star  series, the biopsy tract is visualized.     Ventricles, cisterns and sulci are normal in size and configuration.  Sella within normal limits. Brainstem and posterior fossa are within  normal limits.      Visualized contrasted vessels and noncontrasted flow voids are within  normal limits. Visualized portions of the orbits, paranasal  sinuses and  mastoid air cells are within normal limits. Visualized overlying soft  tissues within normal limits.          Impression:       1. Increased size of right frontal lobe mass lesions compared to  04/02/2019, which are described in more detail above.     This report was finalized on 06/04/2019 08:12 by Dr Sindy Mota MD.    CT Head Without Contrast [[id number]] Collected:  06/03/19 1925     Updated:  06/03/19 1934    Narrative:       EXAMINATION: CT HEAD WO CONTRAST- 6/3/2019 7:25 PM CDT     HISTORY: Bilateral upper extremity weakness, history of brain tumor  (anaplastic infiltrating glioma) and previous right-sided frontal  craniotomy.     DOSE: 633 mGycm (Automatic exposure control technique was implemented in  an effort to keep the radiation dose as low as possible without  compromising image quality)     REPORT: Spiral CT of the head was performed without contrast,  reconstructed coronal and sagittal images are also reviewed.     COMPARISON: CT the head without contrast 04/15/2019, MRI of the brain  with without contrast 04/02/2019.     There is evidence of previous right craniotomy, with resolution of a  small amount of pneumocephalus is seen on the previous head CT. There  are 2 ill-defined masslike areas of increased attenuation within the  right frontal lobe, largest is medially and abuts the falx, just  superior to the right frontal horn. This measures approximately 3.3 cm,  appears to significantly increase in size compared with the previous  head CT, when it measured approximately 1.8 cm. This mass appears to  involve the corpus callosum on the right and has mild mass effect on the  frontal horn. There are areas of increased attenuation is in the lateral  aspect of the right frontal lobe and is ill-defined, measuring up to 1.5  cm. This also appears slightly increased compared with the previous CT.  The ventricles and basal cisterns are within normal limits and there is  no hydrocephalus. No  intracranial hemorrhage is identified. There is no  midline shift or evidence of tonsillar or uncal herniation.       Impression:       1. Evidence of previous right craniotomy for biopsy of 2 right frontal  masses which appear increased in size compared with the previous  examinations, concerning for tumor progression. No intracranial  hemorrhage or midline shift is identified.     This report was finalized on 06/03/2019 19:31 by Dr. Marvin Mar MD.        MRI brain:    MRI spine:   CT Head:  CT c-spine:  CT t-spine:  CT l-spine:  X-ray:    I reviewed the patient's new clinical results.  Lab Results (last 24 hours)     Procedure Component Value Units Date/Time    Urine Drug Screen - Urine, Clean Catch [[id number]]  (Normal) Collected:  06/04/19 0725    Specimen:  Urine, Clean Catch Updated:  06/04/19 0752     Amphetamine Screen, Urine Negative     Barbiturates Screen, Urine Negative     Benzodiazepine Screen, Urine Negative     Cocaine Screen, Urine Negative     Methadone Screen, Urine Negative     Opiate Screen Negative     Phencyclidine (PCP), Urine Negative     THC, Screen, Urine Negative    Narrative:       Negative Thresholds For Drugs Screened in Urine:    Amphetamines          500 ng/ml  Barbiturates          200 ng/ml  Benzodiazepines       200 ng/ml  Cocaine               150 ng/ml  Methadone             150 ng/ml  Opiates               300 ng/ml  Phencyclidine         25 ng/ml  THC                      50 ng/ml    The normal value for all drugs tested is negative. This report includes final unconfirmed screening results.  A positive result by this assay can be, at your request, sent to the Reference Lab for confirmation by gas chromatography. Unconfirmed results must not be used for non-medical purposes, such as employment or legal testing. Clinical consideration should be applied to any drug of abuse test result, particularly when unconfirmed results are used.    Valproic Acid Level, Total [[id number]]   (Normal) Collected:  06/04/19 0617    Specimen:  Blood Updated:  06/04/19 0642     Valproic Acid 83.3 mcg/mL     Basic Metabolic Panel [[id number]]  (Abnormal) Collected:  06/03/19 2250    Specimen:  Blood Updated:  06/03/19 2308     Glucose 103 mg/dL      BUN 35 mg/dL      Creatinine 1.22 mg/dL      Sodium 143 mmol/L      Potassium 4.2 mmol/L      Chloride 114 mmol/L      CO2 19.0 mmol/L      Calcium 9.9 mg/dL      eGFR Non African Amer 62 mL/min/1.73      BUN/Creatinine Ratio 28.7     Anion Gap 10.0 mmol/L     Narrative:       GFR Normal >60  Chronic Kidney Disease <60  Kidney Failure <15    CBC Auto Differential [[id number]]  (Abnormal) Collected:  06/03/19 2250    Specimen:  Blood Updated:  06/03/19 2258     WBC 11.05 10*3/mm3      RBC 5.00 10*6/mm3      Hemoglobin 14.5 g/dL      Hematocrit 41.8 %      MCV 83.6 fL      MCH 29.0 pg      MCHC 34.7 g/dL      RDW 15.1 %      RDW-SD 46.0 fl      MPV 9.3 fL      Platelets 418 10*3/mm3      Neutrophil % 80.9 %      Lymphocyte % 16.3 %      Monocyte % 1.8 %      Eosinophil % 0.2 %      Basophil % 0.3 %      Immature Grans % 0.5 %      Neutrophils, Absolute 8.95 10*3/mm3      Lymphocytes, Absolute 1.80 10*3/mm3      Monocytes, Absolute 0.20 10*3/mm3      Eosinophils, Absolute 0.02 10*3/mm3      Basophils, Absolute 0.03 10*3/mm3      Immature Grans, Absolute 0.05 10*3/mm3      nRBC 0.0 /100 WBC     Levetiracetam Level (Keppra) [[id number]] Collected:  06/03/19 2250    Specimen:  Blood Updated:  06/03/19 2255    Ethanol [[id number]]  (Normal) Collected:  06/03/19 1843    Specimen:  Blood Updated:  06/03/19 2243     Ethanol % <0.010 %     Narrative:       Not for legal purposes. Chain of Custody not followed.     Valproic Acid Level, Total [[id number]]  (Normal) Collected:  06/03/19 1843    Specimen:  Blood Updated:  06/03/19 1913     Valproic Acid 73.7 mcg/mL     Comprehensive Metabolic Panel [[id number]]  (Abnormal) Collected:  06/03/19 1843    Specimen:  Blood Updated:   06/03/19 1910     Glucose 101 mg/dL      BUN 34 mg/dL      Creatinine 1.51 mg/dL      Sodium 143 mmol/L      Potassium 4.1 mmol/L      Chloride 109 mmol/L      CO2 23.0 mmol/L      Calcium 9.8 mg/dL      Total Protein 7.2 g/dL      Albumin 4.10 g/dL      ALT (SGPT) 26 U/L      AST (SGOT) 26 U/L      Alkaline Phosphatase 66 U/L      Total Bilirubin 0.4 mg/dL      eGFR Non African Amer 49 mL/min/1.73      Globulin 3.1 gm/dL      A/G Ratio 1.3 g/dL      BUN/Creatinine Ratio 22.5     Anion Gap 11.0 mmol/L     Narrative:       GFR Normal >60  Chronic Kidney Disease <60  Kidney Failure <15    Magnesium [[id number]]  (Normal) Collected:  06/03/19 1843    Specimen:  Blood Updated:  06/03/19 1910     Magnesium 2.1 mg/dL     Protime-INR [[id number]]  (Normal) Collected:  06/03/19 1843    Specimen:  Blood Updated:  06/03/19 1907     Protime 13.3 Seconds      INR 0.98    CBC & Differential [[id number]] Collected:  06/03/19 1843    Specimen:  Blood Updated:  06/03/19 1858    Narrative:       The following orders were created for panel order CBC & Differential.  Procedure                               Abnormality         Status                     ---------                               -----------         ------                     CBC Auto Differential[[id number]]        Abnormal            Final result                 Please view results for these tests on the individual orders.    CBC Auto Differential [[id number]]  (Abnormal) Collected:  06/03/19 1843    Specimen:  Blood Updated:  06/03/19 1858     WBC 11.39 10*3/mm3      RBC 5.00 10*6/mm3      Hemoglobin 14.3 g/dL      Hematocrit 42.4 %      MCV 84.8 fL      MCH 28.6 pg      MCHC 33.7 g/dL      RDW 15.3 %      RDW-SD 47.6 fl      MPV 9.3 fL      Platelets 390 10*3/mm3      Neutrophil % 70.2 %      Lymphocyte % 23.1 %      Monocyte % 5.7 %      Eosinophil % 0.2 %      Basophil % 0.3 %      Immature Grans % 0.5 %      Neutrophils, Absolute 8.00 10*3/mm3      Lymphocytes, Absolute  2.63 10*3/mm3      Monocytes, Absolute 0.65 10*3/mm3      Eosinophils, Absolute 0.02 10*3/mm3      Basophils, Absolute 0.03 10*3/mm3      Immature Grans, Absolute 0.06 10*3/mm3      nRBC 0.0 /100 WBC           Jose Frausto, APRN

## 2019-06-04 NOTE — PLAN OF CARE
Problem: Patient Care Overview  Goal: Plan of Care Review   06/04/19 1009   Coping/Psychosocial   Plan of Care Reviewed With patient   OTHER   Outcome Summary OT eval complete. Pt. report pain in L rib as 2/10. A&Ox 4. Pt. sup-sit independently. Pt. performed LBD indepedently don/doff socks and independently don shoes. Pt. sit-stand/stand-sit independently. Pt. ambulated in hallway and in stairwell w/CGA x 1. Pt. current performance skills do not require skilled OT services at this time. D/c home w/assist.

## 2019-06-05 ENCOUNTER — DOCUMENTATION (OUTPATIENT)
Dept: ONCOLOGY | Facility: HOSPITAL | Age: 54
End: 2019-06-05

## 2019-06-05 LAB — VALPROATE SERPL-MCNC: 72.4 MCG/ML (ref 50–100)

## 2019-06-05 PROCEDURE — 99233 SBSQ HOSP IP/OBS HIGH 50: CPT | Performed by: PSYCHIATRY & NEUROLOGY

## 2019-06-05 PROCEDURE — 25010000002 HEPARIN (PORCINE) PER 1000 UNITS: Performed by: NEUROLOGICAL SURGERY

## 2019-06-05 PROCEDURE — 80164 ASSAY DIPROPYLACETIC ACD TOT: CPT | Performed by: PSYCHIATRY & NEUROLOGY

## 2019-06-05 PROCEDURE — 25010000002 DEXAMETHASONE PER 1 MG: Performed by: EMERGENCY MEDICINE

## 2019-06-05 PROCEDURE — 99024 POSTOP FOLLOW-UP VISIT: CPT | Performed by: NURSE PRACTITIONER

## 2019-06-05 RX ADMIN — DIVALPROEX SODIUM 1000 MG: 500 TABLET, DELAYED RELEASE ORAL at 09:11

## 2019-06-05 RX ADMIN — QUETIAPINE FUMARATE 100 MG: 100 TABLET ORAL at 21:29

## 2019-06-05 RX ADMIN — LACOSAMIDE 100 MG: 50 TABLET, FILM COATED ORAL at 21:29

## 2019-06-05 RX ADMIN — DEXAMETHASONE SODIUM PHOSPHATE 4 MG: 4 INJECTION, SOLUTION INTRA-ARTICULAR; INTRALESIONAL; INTRAMUSCULAR; INTRAVENOUS; SOFT TISSUE at 19:47

## 2019-06-05 RX ADMIN — SODIUM CHLORIDE, PRESERVATIVE FREE 3 ML: 5 INJECTION INTRAVENOUS at 09:13

## 2019-06-05 RX ADMIN — DEXAMETHASONE SODIUM PHOSPHATE 4 MG: 4 INJECTION, SOLUTION INTRA-ARTICULAR; INTRALESIONAL; INTRAMUSCULAR; INTRAVENOUS; SOFT TISSUE at 09:13

## 2019-06-05 RX ADMIN — CARVEDILOL 25 MG: 25 TABLET, FILM COATED ORAL at 09:12

## 2019-06-05 RX ADMIN — Medication 100 MG: at 09:11

## 2019-06-05 RX ADMIN — CARVEDILOL 25 MG: 25 TABLET, FILM COATED ORAL at 17:41

## 2019-06-05 RX ADMIN — LACOSAMIDE 100 MG: 50 TABLET, FILM COATED ORAL at 09:11

## 2019-06-05 RX ADMIN — ALLOPURINOL 300 MG: 300 TABLET ORAL at 09:11

## 2019-06-05 RX ADMIN — SPIRONOLACTONE 25 MG: 25 TABLET ORAL at 09:11

## 2019-06-05 RX ADMIN — ASPIRIN 81 MG: 81 TABLET, CHEWABLE ORAL at 09:12

## 2019-06-05 RX ADMIN — LOSARTAN POTASSIUM 100 MG: 50 TABLET, FILM COATED ORAL at 09:11

## 2019-06-05 RX ADMIN — PANTOPRAZOLE SODIUM 40 MG: 40 TABLET, DELAYED RELEASE ORAL at 09:11

## 2019-06-05 RX ADMIN — LEVETIRACETAM 1500 MG: 500 TABLET, FILM COATED ORAL at 21:29

## 2019-06-05 RX ADMIN — DEXAMETHASONE SODIUM PHOSPHATE 4 MG: 4 INJECTION, SOLUTION INTRA-ARTICULAR; INTRALESIONAL; INTRAMUSCULAR; INTRAVENOUS; SOFT TISSUE at 00:50

## 2019-06-05 RX ADMIN — DEXAMETHASONE SODIUM PHOSPHATE 4 MG: 4 INJECTION, SOLUTION INTRA-ARTICULAR; INTRALESIONAL; INTRAMUSCULAR; INTRAVENOUS; SOFT TISSUE at 14:01

## 2019-06-05 RX ADMIN — DIVALPROEX SODIUM 1000 MG: 500 TABLET, DELAYED RELEASE ORAL at 21:29

## 2019-06-05 RX ADMIN — HEPARIN SODIUM 5000 UNITS: 5000 INJECTION INTRAVENOUS; SUBCUTANEOUS at 21:29

## 2019-06-05 RX ADMIN — LEVETIRACETAM 1500 MG: 500 TABLET, FILM COATED ORAL at 09:11

## 2019-06-05 RX ADMIN — SODIUM CHLORIDE, PRESERVATIVE FREE 3 ML: 5 INJECTION INTRAVENOUS at 21:29

## 2019-06-05 RX ADMIN — CLONIDINE HYDROCHLORIDE 0.1 MG: 0.1 TABLET ORAL at 21:29

## 2019-06-05 RX ADMIN — HEPARIN SODIUM 5000 UNITS: 5000 INJECTION INTRAVENOUS; SUBCUTANEOUS at 09:13

## 2019-06-05 RX ADMIN — VENLAFAXINE HYDROCHLORIDE 150 MG: 75 CAPSULE, EXTENDED RELEASE ORAL at 09:11

## 2019-06-05 NOTE — PROGRESS NOTES
"5-17-19 - request for social work consultation - patient is being seen for radiation therapy consult, is due to see dr winters next fri with plans to have both chemo and radiation treatments.  He has medicaid benefits and is in process of applying for disability after being denied in 2017.  His mother and sister are present with him on this date and they live close by/are a good support system.  He feels like he is coping with things well but self admits he has had problems with depression/anxiety in the past.  He currently takes seroquel, efexor and has for a couple of years which is perscribed by a doctor in [place].  He doesn't feel like they are effective anymore.  He has a lot of back pain, dizziness and recently fell in the shower.  He likes \"piddling\" with electric stuff to keep busy and prefers to be left alone when upset.  He has not been sleeping good due to anxiety and pain and this has increased in the last 2 months.  Family/patient to contact this worker for further needs if necessary.    "

## 2019-06-05 NOTE — PLAN OF CARE
Problem: Patient Care Overview  Goal: Plan of Care Review  Outcome: Ongoing (interventions implemented as appropriate)   06/05/19 5310   Coping/Psychosocial   Plan of Care Reviewed With patient   Plan of Care Review   Progress improving   OTHER   Outcome Summary AOx4, pain level 2/10 left middle back. no neuro changes. no s/s seizure activity. VSS. Safety maintained.        Problem: Fall Risk (Adult)  Goal: Absence of Fall  Outcome: Ongoing (interventions implemented as appropriate)      Problem: Pain, Chronic (Adult)  Goal: Acceptable Pain/Comfort Level and Functional Ability  Outcome: Ongoing (interventions implemented as appropriate)      Problem: Seizure Disorder/Epilepsy (Adult)  Goal: Signs and Symptoms of Listed Potential Problems Will be Absent, Minimized or Managed (Seizure Disorder/Epilepsy)  Outcome: Ongoing (interventions implemented as appropriate)

## 2019-06-05 NOTE — PAYOR COMM NOTE
"FROM: HANK SOLIS  PHONE: [phone]  FAX: [phone]    ID: [id number]    Lukasz Wahl Mary (54 y.o. Male)     Date of Birth Social Security Number Address Home Phone MRN    1965  [id number]  [place] [address] [phone] [id number]    Church Marital Status          [facility] [place] Single       Admission Date Admission Type Admitting Provider Attending Provider Department, Room/Bed    6/3/19 Emergency Delio Crowe MD [place], William Lee, MD [facility] 3A, 352/1    Discharge Date Discharge Disposition Discharge Destination                       Attending Provider:  Delio Crowe MD    Allergies:  Lipitor [Atorvastatin], Lisinopril    Isolation:  None   Infection:  C.difficile (07/07/17)   Code Status:  CPR    Ht:  172.7 cm (68\")   Wt:  83.9 kg (185 lb)    Admission Cmt:  None   Principal Problem:  None                Active Insurance as of 6/3/2019     Primary Coverage     Payor Plan Insurance Group Employer/Plan Group    [employer] [facility] [employer] [place]      Payor Plan Address Payor Plan Phone Number Payor Plan Fax Number Effective Dates    [address] [address]   7/1/2017 - None Entered    [place] [address]       Subscriber Name Subscriber Birth Date Member ID       LUKASZ WAHL MARY 1965 [id number]                 Emergency Contacts      (Rel.) Home Phone Work Phone Mobile Phone    Ivonne Wahl (Mother) [phone] -- --    Yvette Pollack (Daughter) [phone] -- [phone]               History & Physical      Delio Crowe MD at 6/4/2019  9:27 AM          NEUROSURGERY INITIAL HOSPITAL ENCOUNTER    Assessment/Plan:   Lukasz Wahl is a 54 y.o. male.  He has a medical history of  Malignant hypertension, MSSA, Obstructive sleep apnea, Grade 3 Anaplastic Astrocytoma and Seizures.He presents with a complaint of increased seizure-like activity and progressively worsening slurred speech, left " facial weakness, and left upper extremity weakness. Physical exam findings of slurred speech, mild left facial, left upper extremity weakness, and left pronator drift.  MRI shows 2 stable intracranial masses, and no evidence of hemorrhage.    Differential Diagnosis:   Grade 3 Anaplastic Astrocytoma   Seizure disorder    Recommendations:  Grade 3 anaplastic astrocytoma: Imaging reveals stable lesion.  Differential diagnosis for neurological decline is progressive tumor growth versus exacerbation of known focal motor seizures. Admit to the floor for frequent neuro checks.  No indication for Decadron at this time.    Exacerbation of known focal motor seizures.  The patient presents with waxing and waning left upper extremity twitching and worsening facial droop.  This sounds most consistent with exacerbation of his seizures.  At his last visit these were noted to be uncontrolled.  We will obtain levels of Depakote.  The patient has been started on Vimpat by neurology. EEG. Evaluation by speech therapy given slurred speech    I discussed the patients findings and my recommendations with patient and family    Thank you very much for this interesting consult.     Level of Risk: Moderate due to: mild exacerbation of one chronic illness  MDM: Moderate Complexity  Mod = 00753, High=99223  ___________________________________________________________________    Reason for consult:  Increased left facial and upper extremity weakness    Chief Complaint:   Chief Complaint   Patient presents with   • Cancer   • Extremity Weakness     HPI: Lukasz Savage is a 54 y.o. male with a significant comorbidity Malignant hypertension, MSSA, Obstructive sleep apnea, Grade 3 Anaplastic Astrocytoma and Seizures.  He currently takes Keppra and Depakote as prescribed.  He presented to [facility] emergency room yesterday with with a complaint of increased seizure-like activity and progressively worsening slurred speech, left facial  weakness, and left upper extremity weakness.  The patient has a previously biopsy confirmed grade 3 anaplastic astrocytoma by my partner Dr. Trevino.  At his last follow-up appointment he was complaining of worsening seizure-like activity.  This included tremulous and shaking of his left hand and left face.  Despite being on 2 medications these have been getting progressively worse.  Additionally the patient complains of worsening function of his left upper extremity left face.  This is a mixed picture of both a progressive decline which is not surprising given its location and the motor strip as well as intermittent waxing and waning which is more consistent with epileptiform activity.  The patient has not had any loss of consciousness although his mother does state that intermittently he is now staring off and it takes several seconds to get his attention again which suggest some aspect of generalizability.  He has no bowel or bladder incontinence.  He has had no tonic-clonic seizures.  No other signs of infection and his biopsy sites are clean on his head.    Review of Systems   HENT: Negative.    Eyes: Negative.    Respiratory: Negative.    Cardiovascular: Negative.    Gastrointestinal: Negative.    Endocrine: Negative.    Genitourinary: Negative.    Musculoskeletal: Negative.    Skin: Negative.    Allergic/Immunologic: Negative.    Neurological: Positive for seizures and weakness.   Hematological: Negative.    Psychiatric/Behavioral: Negative.    All other systems reviewed and are negative.     Past Medical History:  has a past medical history of Angio-edema (7/3/2017), Anxiety, Arthritis, Cancer (CMS/HCC) (05/09/2019), Clostridium difficile colitis, Erectile dysfunction (10/6/2016), GERD (gastroesophageal reflux disease), Gout, HCAP (healthcare-associated pneumonia) (7/11/2017), Hyperlipidemia, Malignant hypertension, MSSA (methicillin susceptible Staphylococcus aureus) infection (7/31/2017), Obstructive sleep  apnea, and Seizures (CMS/HCC) (7/4/2017).    Past Surgical History:  has a past surgical history that includes Tracheostomy tube placement (N/A, 7/12/2017); Shoulder arthroscopy (Left); Colonoscopy; and Craniotomy (Right, 4/15/2019).    Family History: family history includes Diabetes in his maternal grandmother and mother; Heart attack in his paternal grandfather; Heart disease in his father; Hypertension in his father and mother; Kidney disease in his sister; No Known Problems in his daughter, maternal grandfather, paternal grandmother, and son.    Social History:  reports that he quit smoking about 23 months ago. His smoking use included cigarettes. He has a 9.90 pack-year smoking history. He has never used smokeless tobacco. He reports that he does not drink alcohol or use drugs.    Allergies: Lipitor [atorvastatin] and Lisinopril    Home Medications:   Current Facility-Administered Medications:   •  allopurinol (ZYLOPRIM) tablet 100 mg, 100 mg, Oral, Daily, Delio Crowe MD  •  ALPRAZolam (XANAX) tablet 0.25 mg, 0.25 mg, Oral, BID PRN, Delio Crowe MD  •  aspirin chewable tablet 81 mg, 81 mg, Oral, Daily, Delio Crowe MD  •  carvedilol (COREG) tablet 25 mg, 25 mg, Oral, BID With Meals, Delio Crowe MD, 25 mg at 06/03/19 2238  •  CloNIDine (CATAPRES) tablet 0.1 mg, 0.1 mg, Oral, Nightly, Delio Crowe MD, 0.1 mg at 06/03/19 2238  •  colchicine tablet 0.6 mg, 0.6 mg, Oral, Daily, Delio Crowe MD  •  cyclobenzaprine (FLEXERIL) tablet 10 mg, 10 mg, Oral, TID PRN, Delio Crowe MD  •  dexamethasone (DECADRON) injection 4 mg, 4 mg, Intravenous, Q6H, Baljinder Zamarripa MD, 4 mg at 06/04/19 0640  •  divalproex (DEPAKOTE) DR tablet 1,000 mg, 1,000 mg, Oral, BID, Delio Crowe MD, 1,000 mg at 06/03/19 2238  •  furosemide (LASIX) tablet 20 mg, 20 mg, Oral, Daily, Delio Crowe MD  •  heparin (porcine) 5000 UNIT/ML injection 5,000  Units, 5,000 Units, Subcutaneous, Q12H, Delio Crowe MD, 5,000 Units at 06/03/19 2238  •  lacosamide (VIMPAT) 50 mg in sodium chloride 0.9 % 50 mL IVPB, 50 mg, Intravenous, Q12H, Doreen Zuniga MD, Last Rate: 100 mL/hr at 06/04/19 0029, 50 mg at 06/04/19 0029  •  levETIRAcetam (KEPPRA) tablet 1,500 mg, 1,500 mg, Oral, Q12H, Delio Crowe MD, 1,500 mg at 06/03/19 2238  •  losartan (COZAAR) tablet 100 mg, 100 mg, Oral, Daily, Delio Crowe MD  •  oxyCODONE-acetaminophen (PERCOCET)  MG per tablet 1 tablet, 1 tablet, Oral, Q4H PRN, Delio Crowe MD  •  pantoprazole (PROTONIX) EC tablet 40 mg, 40 mg, Oral, Daily, Delio Crowe MD  •  QUEtiapine (SEROquel) tablet 100 mg, 100 mg, Oral, Nightly, Delio Crowe MD, 100 mg at 06/03/19 2238  •  sodium chloride 0.9 % flush 3 mL, 3 mL, Intravenous, Q12H, Delio Crowe MD, 3 mL at 06/03/19 2238  •  sodium chloride 0.9 % flush 3-10 mL, 3-10 mL, Intravenous, PRN, Delio Crowe MD  •  spironolactone (ALDACTONE) tablet 25 mg, 25 mg, Oral, Daily, Delio Crowe MD  •  venlafaxine XR (EFFEXOR-XR) 24 hr capsule 150 mg, 150 mg, Oral, Daily, Delio Crowe MD  •  vitamin B-6 (PYRIDOXINE) tablet 100 mg, 100 mg, Oral, Daily, Delio Crowe MD    Medications: Scheduled Meds:  allopurinol 100 mg Oral Daily   aspirin 81 mg Oral Daily   carvedilol 25 mg Oral BID With Meals   CloNIDine 0.1 mg Oral Nightly   colchicine 0.6 mg Oral Daily   dexamethasone 4 mg Intravenous Q6H   divalproex 1,000 mg Oral BID   furosemide 20 mg Oral Daily   heparin (porcine) 5,000 Units Subcutaneous Q12H   lacosamide (VIMPAT) IVPB 50 mg Intravenous Q12H   levETIRAcetam 1,500 mg Oral Q12H   losartan 100 mg Oral Daily   pantoprazole 40 mg Oral Daily   QUEtiapine 100 mg Oral Nightly   sodium chloride 3 mL Intravenous Q12H   spironolactone 25 mg Oral Daily   venlafaxine  mg Oral Daily   vitamin  B-6 100 mg Oral Daily     Continuous Infusions:   PRN Meds:.ALPRAZolam  •  cyclobenzaprine  •  oxyCODONE-acetaminophen  •  sodium chloride    Vital Signs  Temp:  [97.8 °F (36.6 °C)-98.2 °F (36.8 °C)] 98.2 °F (36.8 °C)  Heart Rate:  [70-89] 70  Resp:  [18] 18  BP: (113-143)/(71-98) 113/71    Physical Exam  Physical Exam   Constitutional: He is oriented to person, place, and time. He appears well-developed and well-nourished.  Non-toxic appearance. He does not have a sickly appearance. He does not appear ill. No distress.   HENT:   Head: Normocephalic and atraumatic.   Right Ear: Hearing normal.   Left Ear: Hearing normal.   Mouth/Throat: Mucous membranes are normal.   Eyes: Conjunctivae and EOM are normal. Pupils are equal, round, and reactive to light.   Fundoscopic exam:       The right eye shows no exudate, no hemorrhage and no papilledema.        The left eye shows no exudate, no hemorrhage and no papilledema.   Neck: Trachea normal, normal range of motion and full passive range of motion without pain. Neck supple.   Cardiovascular: Normal rate and regular rhythm.   Pulses:       Dorsalis pedis pulses are 2+ on the right side, and 2+ on the left side.        Posterior tibial pulses are 2+ on the right side, and 2+ on the left side.   Pulmonary/Chest: Effort normal. No accessory muscle usage. No apnea, no tachypnea and no bradypnea. No respiratory distress.   Abdominal: Soft. Normal appearance.     Vascular Status -  His right foot exhibits no edema. His left foot exhibits no edema.  Neurological: He is alert and oriented to person, place, and time. He has a normal Finger-Nose-Finger Test, a normal Heel to Merino Test and a normal Tandem Gait Test. Gait normal.   Skin: Skin is warm, dry and intact. No rash noted. No erythema.   Psychiatric: He has a normal mood and affect. His behavior is normal. His speech is slurred.   Nursing note and vitals reviewed.      Neurologic Exam     Mental Status   Oriented to person,  place, and time.   Registration: recalls 3 of 3 objects. Recall at 5 minutes: recalls 3 of 3 objects.   Attention: normal. Concentration: normal.   Speech: slurred   Level of consciousness: alert  Knowledge: consistent with education.     Cranial Nerves     CN II   Visual acuity: normal    CN III, IV, VI   Pupils are equal, round, and reactive to light.  Extraocular motions are normal.   Diplopia: none    CN V   Facial sensation intact.   Right corneal reflex: normal  Left corneal reflex: normal    CN VII   Right facial weakness: none  Left facial weakness: central    CN VIII   Hearing: intact    CN IX, X   Palate: symmetric  Right gag reflex: normal  Left gag reflex: normal    CN XI   Right trapezius strength: normal  Left trapezius strength: normal    CN XII   CN XII normal.   Tongue deviation: none    Motor Exam   Muscle bulk: normal  Overall muscle tone: normal  Right arm tone: normal  Left arm tone: normal  Right arm pronator drift: absent  Left arm pronator drift: present  Right leg tone: normal  Left leg tone: normal    Strength   Strength 5/5 except as noted.   Left deltoid: 4/5  Left biceps: 4/5  Left triceps: 4/5    Sensory Exam   Light touch normal.   Proprioception normal.     Gait, Coordination, and Reflexes     Gait  Gait: normal    Coordination   Finger to nose coordination: normal  Heel to shin coordination: normal  Tandem walking coordination: normal    Tremor   Resting tremor: present  Intention tremor: present  Action tremor: absent    Reflexes   Reflexes 2+ except as noted.   Right plantar: normal  Left plantar: normal  Right Koenig: absent  Left Koenig: absent    Results Review:   Independent review and interpretation of imaging  Imaging Results (last 24 hours)     Procedure Component Value Units Date/Time    MRI Brain With & Without Contrast [[id number]] Collected:  06/04/19 0755     Updated:  06/04/19 0815    Narrative:       EXAM: MRI BRAIN W WO CONTRAST- - 6/3/2019 9:49 PM CDT     HISTORY:  new L arm weakness; D33.2-Benign neoplasm of brain,  unspecified; R29.898-Other symptoms and signs involving the  musculoskeletal system. 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     COMPARISON: 06/03/2019.      TECHNIQUE: Routine protocol MRI performed of the brain without and with  intravenous contrast.     FINDINGS:  There is a masslike appearance at the right frontal lobe, parafalcine  location measuring about 2.9 x 1.4 cm, previously 1.7 x 1.1 cm, with  mass effect and possible extension into the corpus callosum. The  aforementioned mass does efface the adjacent sulci, but there is no  significant midline shift. The second previously described larger area  has indistinct borders and is difficult to measure, appearing similar to  slightly increased in size and involves the right motor cortex. There is  associated faint enhancement and mild restricted diffusion. The mass  lesions do not appear to cross midline. There is some questionable  precontrast T1 hyperintensity such as axial series serial one image 1  22-99. This could represent hemorrhage/necrosis,  postbiopsy/posttreatment changes or possibly artifact. On T2 star  series, the biopsy tract is visualized.     Ventricles, cisterns and sulci are normal in size and configuration.  Sella within normal limits. Brainstem and posterior fossa are within  normal limits.      Visualized contrasted vessels and noncontrasted flow voids are within  normal limits. Visualized portions of the orbits, paranasal sinuses and  mastoid air cells are within normal limits. Visualized overlying soft  tissues within normal limits.          Impression:       1. Increased size of right frontal lobe mass lesions compared to  04/02/2019, which are described in more detail above.     This report was finalized on 06/04/2019 08:12 by Dr Sindy Mota MD.    CT Head Without  Contrast [[id number]] Collected:  06/03/19 1925     Updated:  06/03/19 1934    Narrative:       EXAMINATION: CT HEAD WO CONTRAST- 6/3/2019 7:25 PM CDT     HISTORY: Bilateral upper extremity weakness, history of brain tumor  (anaplastic infiltrating glioma) and previous right-sided frontal  craniotomy.     DOSE: 633 mGycm (Automatic exposure control technique was implemented in  an effort to keep the radiation dose as low as possible without  compromising image quality)     REPORT: Spiral CT of the head was performed without contrast,  reconstructed coronal and sagittal images are also reviewed.     COMPARISON: CT the head without contrast 04/15/2019, MRI of the brain  with without contrast 04/02/2019.     There is evidence of previous right craniotomy, with resolution of a  small amount of pneumocephalus is seen on the previous head CT. There  are 2 ill-defined masslike areas of increased attenuation within the  right frontal lobe, largest is medially and abuts the falx, just  superior to the right frontal horn. This measures approximately 3.3 cm,  appears to significantly increase in size compared with the previous  head CT, when it measured approximately 1.8 cm. This mass appears to  involve the corpus callosum on the right and has mild mass effect on the  frontal horn. There are areas of increased attenuation is in the lateral  aspect of the right frontal lobe and is ill-defined, measuring up to 1.5  cm. This also appears slightly increased compared with the previous CT.  The ventricles and basal cisterns are within normal limits and there is  no hydrocephalus. No intracranial hemorrhage is identified. There is no  midline shift or evidence of tonsillar or uncal herniation.       Impression:       1. Evidence of previous right craniotomy for biopsy of 2 right frontal  masses which appear increased in size compared with the previous  examinations, concerning for tumor progression. No intracranial  hemorrhage or  midline shift is identified.     This report was finalized on 06/03/2019 19:31 by Dr. Marvin Mar MD.        MRI brain:    MRI spine:   CT Head:  CT c-spine:  CT t-spine:  CT l-spine:  X-ray:    I reviewed the patient's new clinical results.  Lab Results (last 24 hours)     Procedure Component Value Units Date/Time    Urine Drug Screen - Urine, Clean Catch [[id number]]  (Normal) Collected:  06/04/19 0725    Specimen:  Urine, Clean Catch Updated:  06/04/19 0752     Amphetamine Screen, Urine Negative     Barbiturates Screen, Urine Negative     Benzodiazepine Screen, Urine Negative     Cocaine Screen, Urine Negative     Methadone Screen, Urine Negative     Opiate Screen Negative     Phencyclidine (PCP), Urine Negative     THC, Screen, Urine Negative    Narrative:       Negative Thresholds For Drugs Screened in Urine:    Amphetamines          500 ng/ml  Barbiturates          200 ng/ml  Benzodiazepines       200 ng/ml  Cocaine               150 ng/ml  Methadone             150 ng/ml  Opiates               300 ng/ml  Phencyclidine         25 ng/ml  THC                      50 ng/ml    The normal value for all drugs tested is negative. This report includes final unconfirmed screening results.  A positive result by this assay can be, at your request, sent to the Reference Lab for confirmation by gas chromatography. Unconfirmed results must not be used for non-medical purposes, such as employment or legal testing. Clinical consideration should be applied to any drug of abuse test result, particularly when unconfirmed results are used.    Valproic Acid Level, Total [[id number]]  (Normal) Collected:  06/04/19 0617    Specimen:  Blood Updated:  06/04/19 0642     Valproic Acid 83.3 mcg/mL     Basic Metabolic Panel [[id number]]  (Abnormal) Collected:  06/03/19 2250    Specimen:  Blood Updated:  06/03/19 2308     Glucose 103 mg/dL      BUN 35 mg/dL      Creatinine 1.22 mg/dL      Sodium 143 mmol/L      Potassium 4.2 mmol/L       Chloride 114 mmol/L      CO2 19.0 mmol/L      Calcium 9.9 mg/dL      eGFR Non African Amer 62 mL/min/1.73      BUN/Creatinine Ratio 28.7     Anion Gap 10.0 mmol/L     Narrative:       GFR Normal >60  Chronic Kidney Disease <60  Kidney Failure <15    CBC Auto Differential [[id number]]  (Abnormal) Collected:  06/03/19 2250    Specimen:  Blood Updated:  06/03/19 2258     WBC 11.05 10*3/mm3      RBC 5.00 10*6/mm3      Hemoglobin 14.5 g/dL      Hematocrit 41.8 %      MCV 83.6 fL      MCH 29.0 pg      MCHC 34.7 g/dL      RDW 15.1 %      RDW-SD 46.0 fl      MPV 9.3 fL      Platelets 418 10*3/mm3      Neutrophil % 80.9 %      Lymphocyte % 16.3 %      Monocyte % 1.8 %      Eosinophil % 0.2 %      Basophil % 0.3 %      Immature Grans % 0.5 %      Neutrophils, Absolute 8.95 10*3/mm3      Lymphocytes, Absolute 1.80 10*3/mm3      Monocytes, Absolute 0.20 10*3/mm3      Eosinophils, Absolute 0.02 10*3/mm3      Basophils, Absolute 0.03 10*3/mm3      Immature Grans, Absolute 0.05 10*3/mm3      nRBC 0.0 /100 WBC     Levetiracetam Level (Keppra) [[id number]] Collected:  06/03/19 2250    Specimen:  Blood Updated:  06/03/19 2255    Ethanol [[id number]]  (Normal) Collected:  06/03/19 1843    Specimen:  Blood Updated:  06/03/19 2243     Ethanol % <0.010 %     Narrative:       Not for legal purposes. Chain of Custody not followed.     Valproic Acid Level, Total [[id number]]  (Normal) Collected:  06/03/19 1843    Specimen:  Blood Updated:  06/03/19 1913     Valproic Acid 73.7 mcg/mL     Comprehensive Metabolic Panel [[id number]]  (Abnormal) Collected:  06/03/19 1843    Specimen:  Blood Updated:  06/03/19 1910     Glucose 101 mg/dL      BUN 34 mg/dL      Creatinine 1.51 mg/dL      Sodium 143 mmol/L      Potassium 4.1 mmol/L      Chloride 109 mmol/L      CO2 23.0 mmol/L      Calcium 9.8 mg/dL      Total Protein 7.2 g/dL      Albumin 4.10 g/dL      ALT (SGPT) 26 U/L      AST (SGOT) 26 U/L      Alkaline Phosphatase 66 U/L      Total Bilirubin 0.4  mg/dL      eGFR Non African Amer 49 mL/min/1.73      Globulin 3.1 gm/dL      A/G Ratio 1.3 g/dL      BUN/Creatinine Ratio 22.5     Anion Gap 11.0 mmol/L     Narrative:       GFR Normal >60  Chronic Kidney Disease <60  Kidney Failure <15    Magnesium [[id number]]  (Normal) Collected:  06/03/19 1843    Specimen:  Blood Updated:  06/03/19 1910     Magnesium 2.1 mg/dL     Protime-INR [[id number]]  (Normal) Collected:  06/03/19 1843    Specimen:  Blood Updated:  06/03/19 1907     Protime 13.3 Seconds      INR 0.98    CBC & Differential [[id number]] Collected:  06/03/19 1843    Specimen:  Blood Updated:  06/03/19 1858    Narrative:       The following orders were created for panel order CBC & Differential.  Procedure                               Abnormality         Status                     ---------                               -----------         ------                     CBC Auto Differential[[id number]]        Abnormal            Final result                 Please view results for these tests on the individual orders.    CBC Auto Differential [[id number]]  (Abnormal) Collected:  06/03/19 1843    Specimen:  Blood Updated:  06/03/19 1858     WBC 11.39 10*3/mm3      RBC 5.00 10*6/mm3      Hemoglobin 14.3 g/dL      Hematocrit 42.4 %      MCV 84.8 fL      MCH 28.6 pg      MCHC 33.7 g/dL      RDW 15.3 %      RDW-SD 47.6 fl      MPV 9.3 fL      Platelets 390 10*3/mm3      Neutrophil % 70.2 %      Lymphocyte % 23.1 %      Monocyte % 5.7 %      Eosinophil % 0.2 %      Basophil % 0.3 %      Immature Grans % 0.5 %      Neutrophils, Absolute 8.00 10*3/mm3      Lymphocytes, Absolute 2.63 10*3/mm3      Monocytes, Absolute 0.65 10*3/mm3      Eosinophils, Absolute 0.02 10*3/mm3      Basophils, Absolute 0.03 10*3/mm3      Immature Grans, Absolute 0.06 10*3/mm3      nRBC 0.0 /100 WBC           EPI [place]        Electronically signed by Delio Crowe MD at 6/4/2019  1:49 PM          Emergency Department Notes       Baljinder Zamarripa MD at 6/3/2019  7:32 PM          Subjective   54-year-old male presenting to the emergency department with left arm weakness.  Patient has a history of brain cancer which was recently diagnosed as a high-grade astrocytoma.  Patient states that he has been having increased seizure-like activity and then yesterday he noted to have left arm weakness.  Family states that is substantially worse today.  Patient denies any fevers chills headaches or any other visual symptoms.  Patient denies any other motor dysfunction.            Review of Systems   HENT: Negative.    Eyes: Negative.    Respiratory: Negative.    Cardiovascular: Negative.    Gastrointestinal: Negative.    Endocrine: Negative.    Genitourinary: Negative.    Musculoskeletal: Negative.    Skin: Negative.    Allergic/Immunologic: Negative.    Neurological: Positive for seizures and weakness.   Hematological: Negative.    Psychiatric/Behavioral: Negative.    All other systems reviewed and are negative.      Past Medical History:   Diagnosis Date   • Angio-edema 7/3/2017   • Anxiety    • Arthritis    • Cancer (CMS/[facility]) 05/09/2019    Brain cancer diagnoised yesterday  Dr Trevino    • Clostridium difficile colitis    • Erectile dysfunction 10/6/2016   • GERD (gastroesophageal reflux disease)    • Gout    • HCAP (healthcare-associated pneumonia) 7/11/2017   • Hyperlipidemia    • Malignant hypertension    • MSSA (methicillin susceptible Staphylococcus aureus) infection 7/31/2017   • Obstructive sleep apnea    • Seizures (CMS/[facility]) 7/4/2017       Allergies   Allergen Reactions   • Lipitor [Atorvastatin] Rash   • Lisinopril Angioedema     Swell tongue       Past Surgical History:   Procedure Laterality Date   • COLONOSCOPY     • CRANIOTOMY Right 4/15/2019    Procedure: CRANIOTOMY WITH BIOPSY RIGHT;  Surgeon: Dillon Trevino MD;  Location: [facility] OR;  Service: Neurosurgery   • SHOULDER ARTHROSCOPY Left    • TRACHEOSTOMY N/A 7/12/2017    Procedure:  TRACHEOSTOMY WITH TRACHEOSCOPY;  Surgeon: Jairon Pierson MD;  Location: [facility];  Service:        Family History   Problem Relation Age of Onset   • Hypertension Mother    • Diabetes Mother    • Heart disease Father    • Hypertension Father    • No Known Problems Daughter    • No Known Problems Son    • Diabetes Maternal Grandmother    • No Known Problems Maternal Grandfather    • No Known Problems Paternal Grandmother    • Heart attack Paternal Grandfather    • Kidney disease Sister        Social History     Socioeconomic History   • Marital status: Single     Spouse name: Not on file   • Number of children: 2   • Years of education: Not on file   • Highest education level: Not on file   Occupational History   • Occupation: ELECTRIAL WORK   Tobacco Use   • Smoking status: Former Smoker     Packs/day: 0.33     Years: 30.00     Pack years: 9.90     Types: Cigarettes     Last attempt to quit: 7/3/2017     Years since quittin.9   • Smokeless tobacco: Never Used   Substance and Sexual Activity   • Alcohol use: Yes     Comment: used to drink alot but now just ocasional beer since has seizure in 2017   • Drug use: No   • Sexual activity: Defer           Objective   Physical Exam   Constitutional: He is oriented to person, place, and time. He appears well-developed and well-nourished.   HENT:   Head: Normocephalic and atraumatic.   Nose: Nose normal.   Eyes: EOM are normal. Pupils are equal, round, and reactive to light.   Neck: Normal range of motion. Neck supple.   Cardiovascular: Normal rate, regular rhythm and normal heart sounds.   Pulmonary/Chest: Effort normal and breath sounds normal.   Abdominal: Soft. He exhibits no distension. There is no tenderness. There is no rebound and no guarding.   Musculoskeletal: Normal range of motion.   Neurological: He is alert and oriented to person, place, and time. No cranial nerve deficit or sensory deficit.   Patient has difficulty with left arm flexion extension at the  shoulder as well as drift is being appreciated.  Patient has difficulty with shoulder shrug on the left.  Neurologically otherwise patient is intact.   Skin: Skin is warm and dry. Capillary refill takes less than 2 seconds.   Psychiatric: He has a normal mood and affect. His behavior is normal.   Nursing note and vitals reviewed.      Procedures          ED Course  ED Course as of Jun 03 1935   Mon Jun 03, 2019 1934 Patient to be admitted to Dr. Crowe service after discussion with him.  Patient will have an MRI brain with and without contrast as well as an EEG and be started on Decadron.  [AP]      ED Course User Index  [AP] Baljinder Zamarripa MD                  [facility]      Final diagnoses:   Benign neoplasm of brain, unspecified brain region (CMS/HCC)   Left arm weakness            Baljinder Zamarripa MD  06/03/19 1935      Electronically signed by Baljinder Zamarripa MD at 6/3/2019  7:35 PM       ICU Vital Signs     Row Name 06/05/19 0816 06/05/19 0532 06/05/19 0003 06/04/19 2115 06/04/19 2039       Vitals    Temp  97.8 °F (36.6 °C)  98 °F (36.7 °C)  98 °F (36.7 °C)  98.6 °F (37 °C)  --    Temp src  Oral  Oral  Oral  Oral  --    Pulse  70  78  78  77  --    Heart Rate Source  Monitor  Monitor  Monitor  Monitor  --    Resp  16  16  16  16  --    Resp Rate Source  Visual  Visual  Visual  Visual  --    BP  134/61  127/72  121/78  112/73  --    BP Location  Left arm  Left arm  Left arm  Right arm  --    BP Method  Automatic  Automatic  Automatic  Automatic  --    Patient Position  Lying  Lying  Lying  Lying  --       Oxygen Therapy    SpO2  98 %  98 %  98 %  97 %  --    Pulse Oximetry Type  Intermittent  --  Intermittent  Intermittent  --    Device (Oxygen Therapy)  room air  --  room air  room air  room air    Row Name 06/04/19 1600 06/04/19 1001 06/04/19 0930 06/04/19 0345 06/03/19 2342       Vitals    Temp  97.6 °F (36.4 °C)  97.8 °F (36.6 °C)  --  98.2 °F (36.8 °C)  98.2 °F (36.8 °C)    Temp src  Oral  Oral  --  Oral  Oral  "   Pulse  76  75  --  70  87    Heart Rate Source  Monitor  Monitor  --  Monitor  Monitor    Resp  20  18  --  18  18    Resp Rate Source  Visual  Visual  --  Visual  Visual    BP  111/78  146/84  --  113/71  118/82    BP Location  Left arm  Left arm  --  Left arm  Left arm    BP Method  Automatic  Automatic  --  Automatic  Automatic    Patient Position  Lying  Lying  --  Lying  Lying       Oxygen Therapy    SpO2  98 %  96 %  --  98 %  98 %    Pulse Oximetry Type  Intermittent  Intermittent  --  --  --    Device (Oxygen Therapy)  room air  room air  room air  room air  room air    Row Name 06/03/19 2034 06/03/19 2030 06/03/19 2012 06/03/19 1902 06/03/19 1846       Height and Weight    Height  172.7 cm (68\")  --  --  --  --    Weight  83.9 kg (185 lb)  --  --  --  --    Ideal Body Weight (IBW) (kg)  70.89  --  --  --  --    BSA (Calculated - sq m)  1.98 sq meters  --  --  --  --    BMI (Calculated)  28.1  --  --  --  --    Weight in (lb) to have BMI = 25  164.1  --  --  --  --       Vitals    Temp  97.8 °F (36.6 °C)  --  --  --  --    Temp src  Oral  --  --  --  --    Pulse  78  --  76  77  81    Heart Rate Source  Monitor  --  --  --  --    Resp  18  --  --  --  --    Resp Rate Source  Visual  --  --  --  --    BP  143/90  --  129/90  132/88  124/98    BP Location  Left arm  --  --  --  --    BP Method  Automatic  --  --  --  --    Patient Position  Lying  --  --  --  --       Oxygen Therapy    SpO2  100 %  --  100 %  100 %  100 %    Pulse Oximetry Type  Intermittent  --  --  --  --    Device (Oxygen Therapy)  room air  room air  --  --  --    Row Name 06/03/19 1831 06/03/19 1816 06/03/19 1804 06/03/19 1747          Height and Weight    Height  --  --  --  172.7 cm (68\")     Weight  --  --  --  83.9 kg (185 lb)     Ideal Body Weight (IBW) (kg)  --  --  --  70.89     BSA (Calculated - sq m)  --  --  --  1.98 sq meters     BMI (Calculated)  --  --  --  28.1     Weight in (lb) to have BMI = 25  --  --  --  164.1        " Vitals    Temp  --  --  --  97.9 °F (36.6 °C)     Pulse  75  82  83  89     Resp  --  --  --  18     BP  125/89  118/86  122/95  139/85        Oxygen Therapy    SpO2  100 %  100 %  100 %  100 %         Hospital Medications (all)       Dose Frequency Start End    allopurinol (ZYLOPRIM) tablet 300 mg 300 mg Daily 6/5/2019     Sig - Route: Take 1 tablet by mouth Daily. - Oral    ALPRAZolam (XANAX) tablet 0.25 mg 0.25 mg 2 Times Daily PRN 6/3/2019     Sig - Route: Take 1 tablet by mouth 2 (Two) Times a Day As Needed for Anxiety. - Oral    aspirin chewable tablet 81 mg 81 mg Daily 6/4/2019     Sig - Route: Chew 1 tablet Daily. - Oral    carvedilol (COREG) tablet 25 mg 25 mg 2 Times Daily With Meals 6/3/2019     Sig - Route: Take 1 tablet by mouth 2 (Two) Times a Day With Meals. - Oral    CloNIDine (CATAPRES) tablet 0.1 mg 0.1 mg Nightly 6/3/2019     Sig - Route: Take 1 tablet by mouth Every Night. - Oral    colchicine tablet 0.6 mg 0.6 mg Daily 6/4/2019     Sig - Route: Take 1 tablet by mouth Daily. - Oral    cyclobenzaprine (FLEXERIL) tablet 10 mg 10 mg 3 Times Daily PRN 6/3/2019     Sig - Route: Take 1 tablet by mouth 3 (Three) Times a Day As Needed for Muscle Spasms. - Oral    dexamethasone (DECADRON) injection 10 mg 10 mg Once 6/3/2019 6/3/2019    Sig - Route: Infuse 1 mL into a venous catheter 1 (One) Time. - Intravenous    dexamethasone (DECADRON) injection 4 mg 4 mg Every 6 Hours 6/4/2019     Sig - Route: Infuse 1 mL into a venous catheter Every 6 (Six) Hours. - Intravenous    divalproex (DEPAKOTE) DR tablet 1,000 mg 1,000 mg 2 Times Daily 6/3/2019     Sig - Route: Take 2 tablets by mouth 2 (Two) Times a Day. - Oral    furosemide (LASIX) tablet 20 mg 20 mg Daily PRN 6/4/2019     Sig - Route: Take 1 tablet by mouth Daily As Needed (swelling). - Oral    gadobenate dimeglumine (MULTIHANCE) injection 15 mL 15 mL Once in Imaging 6/3/2019 6/3/2019    Sig - Route: Infuse 15 mL into a venous catheter Once. - Intravenous     heparin (porcine) 5000 UNIT/ML injection 5,000 Units 5,000 Units Every 12 Hours Scheduled 6/3/2019     Sig - Route: Inject 1 mL under the skin into the appropriate area as directed Every 12 (Twelve) Hours. - Subcutaneous    lacosamide (VIMPAT) tablet 100 mg 100 mg Every 12 Hours Scheduled 6/5/2019     Sig - Route: Take 2 tablets by mouth Every 12 (Twelve) Hours. - Oral    lacosamide (VIMPAT) tablet 50 mg 50 mg Once 6/4/2019 6/4/2019    Sig - Route: Take 1 tablet by mouth 1 (One) Time. - Oral    levETIRAcetam (KEPPRA) tablet 1,500 mg 1,500 mg Every 12 Hours Scheduled 6/3/2019     Sig - Route: Take 3 tablets by mouth Every 12 (Twelve) Hours. - Oral    losartan (COZAAR) tablet 100 mg 100 mg Daily 6/4/2019     Sig - Route: Take 2 tablets by mouth Daily. - Oral    oxyCODONE-acetaminophen (PERCOCET)  MG per tablet 1 tablet 1 tablet Every 4 Hours PRN 6/3/2019     Sig - Route: Take 1 tablet by mouth Every 4 (Four) Hours As Needed for Moderate Pain . - Oral    pantoprazole (PROTONIX) EC tablet 40 mg 40 mg Daily 6/4/2019     Sig - Route: Take 1 tablet by mouth Daily. - Oral    QUEtiapine (SEROquel) tablet 100 mg 100 mg Nightly 6/3/2019     Sig - Route: Take 1 tablet by mouth Every Night. - Oral    sodium chloride 0.9 % flush 3 mL 3 mL Every 12 Hours Scheduled 6/3/2019     Sig - Route: Infuse 3 mL into a venous catheter Every 12 (Twelve) Hours. - Intravenous    sodium chloride 0.9 % flush 3-10 mL 3-10 mL As Needed 6/3/2019     Sig - Route: Infuse 3-10 mL into a venous catheter As Needed for Line Care. - Intravenous    spironolactone (ALDACTONE) tablet 25 mg 25 mg Daily 6/4/2019     Sig - Route: Take 1 tablet by mouth Daily. - Oral    venlafaxine XR (EFFEXOR-XR) 24 hr capsule 150 mg 150 mg Daily 6/4/2019     Sig - Route: Take 2 capsules by mouth Daily. - Oral    vitamin B-6 (PYRIDOXINE) tablet 100 mg 100 mg Daily 6/4/2019     Sig - Route: Take 2 tablets by mouth Daily. - Oral    allopurinol (ZYLOPRIM) tablet 100 mg  (Discontinued) 100 mg Daily 6/4/2019 6/4/2019    Sig - Route: Take 1 tablet by mouth Daily. - Oral    Reason for Discontinue: Reorder    furosemide (LASIX) tablet 20 mg (Discontinued) 20 mg Daily 6/4/2019 6/4/2019    Sig - Route: Take 1 tablet by mouth Daily. - Oral    Reason for Discontinue: Reorder    lacosamide (VIMPAT) 50 mg in sodium chloride 0.9 % 50 mL IVPB (Discontinued) 50 mg Every 12 Hours Scheduled 6/3/2019 6/4/2019    Sig - Route: Infuse 50 mg into a venous catheter Every 12 (Twelve) Hours. - Intravenous    lacosamide (VIMPAT) tablet 50 mg (Discontinued) 50 mg Every 12 Hours Scheduled 6/5/2019 6/4/2019    Sig - Route: Take 1 tablet by mouth Every 12 (Twelve) Hours. - Oral          Lab Results (last 48 hours)     Procedure Component Value Units Date/Time    Valproic Acid Level, Total [[id number]]  (Normal) Collected:  06/05/19 0605    Specimen:  Blood Updated:  06/05/19 0649     Valproic Acid 72.4 mcg/mL     Urine Drug Screen - Urine, Clean Catch [[id number]]  (Normal) Collected:  06/04/19 0725    Specimen:  Urine, Clean Catch Updated:  06/04/19 0752     Amphetamine Screen, Urine Negative     Barbiturates Screen, Urine Negative     Benzodiazepine Screen, Urine Negative     Cocaine Screen, Urine Negative     Methadone Screen, Urine Negative     Opiate Screen Negative     Phencyclidine (PCP), Urine Negative     THC, Screen, Urine Negative    Narrative:       Negative Thresholds For Drugs Screened in Urine:    Amphetamines          500 ng/ml  Barbiturates          200 ng/ml  Benzodiazepines       200 ng/ml  Cocaine               150 ng/ml  Methadone             150 ng/ml  Opiates               300 ng/ml  Phencyclidine         25 ng/ml  THC                      50 ng/ml    The normal value for all drugs tested is negative. This report includes final unconfirmed screening results.  A positive result by this assay can be, at your request, sent to the Reference Lab for confirmation by gas chromatography.  Unconfirmed results must not be used for non-medical purposes, such as employment or legal testing. Clinical consideration should be applied to any drug of abuse test result, particularly when unconfirmed results are used.    Valproic Acid Level, Total [[id number]]  (Normal) Collected:  06/04/19 0617    Specimen:  Blood Updated:  06/04/19 0642     Valproic Acid 83.3 mcg/mL     Basic Metabolic Panel [[id number]]  (Abnormal) Collected:  06/03/19 2250    Specimen:  Blood Updated:  06/03/19 2308     Glucose 103 mg/dL      BUN 35 mg/dL      Creatinine 1.22 mg/dL      Sodium 143 mmol/L      Potassium 4.2 mmol/L      Chloride 114 mmol/L      CO2 19.0 mmol/L      Calcium 9.9 mg/dL      eGFR Non African Amer 62 mL/min/1.73      BUN/Creatinine Ratio 28.7     Anion Gap 10.0 mmol/L     Narrative:       GFR Normal >60  Chronic Kidney Disease <60  Kidney Failure <15    CBC Auto Differential [[id number]]  (Abnormal) Collected:  06/03/19 2250    Specimen:  Blood Updated:  06/03/19 2258     WBC 11.05 10*3/mm3      RBC 5.00 10*6/mm3      Hemoglobin 14.5 g/dL      Hematocrit 41.8 %      MCV 83.6 fL      MCH 29.0 pg      MCHC 34.7 g/dL      RDW 15.1 %      RDW-SD 46.0 fl      MPV 9.3 fL      Platelets 418 10*3/mm3      Neutrophil % 80.9 %      Lymphocyte % 16.3 %      Monocyte % 1.8 %      Eosinophil % 0.2 %      Basophil % 0.3 %      Immature Grans % 0.5 %      Neutrophils, Absolute 8.95 10*3/mm3      Lymphocytes, Absolute 1.80 10*3/mm3      Monocytes, Absolute 0.20 10*3/mm3      Eosinophils, Absolute 0.02 10*3/mm3      Basophils, Absolute 0.03 10*3/mm3      Immature Grans, Absolute 0.05 10*3/mm3      nRBC 0.0 /100 WBC     Levetiracetam Level (Keppra) [[id number]] Collected:  06/03/19 2250    Specimen:  Blood Updated:  06/03/19 2255    Ethanol [[id number]]  (Normal) Collected:  06/03/19 1843    Specimen:  Blood Updated:  06/03/19 2243     Ethanol % <0.010 %     Narrative:       Not for legal purposes. Chain of Custody not followed.      Valproic Acid Level, Total [[id number]]  (Normal) Collected:  06/03/19 1843    Specimen:  Blood Updated:  06/03/19 1913     Valproic Acid 73.7 mcg/mL     Comprehensive Metabolic Panel [[id number]]  (Abnormal) Collected:  06/03/19 1843    Specimen:  Blood Updated:  06/03/19 1910     Glucose 101 mg/dL      BUN 34 mg/dL      Creatinine 1.51 mg/dL      Sodium 143 mmol/L      Potassium 4.1 mmol/L      Chloride 109 mmol/L      CO2 23.0 mmol/L      Calcium 9.8 mg/dL      Total Protein 7.2 g/dL      Albumin 4.10 g/dL      ALT (SGPT) 26 U/L      AST (SGOT) 26 U/L      Alkaline Phosphatase 66 U/L      Total Bilirubin 0.4 mg/dL      eGFR Non African Amer 49 mL/min/1.73      Globulin 3.1 gm/dL      A/G Ratio 1.3 g/dL      BUN/Creatinine Ratio 22.5     Anion Gap 11.0 mmol/L     Narrative:       GFR Normal >60  Chronic Kidney Disease <60  Kidney Failure <15    Magnesium [[id number]]  (Normal) Collected:  06/03/19 1843    Specimen:  Blood Updated:  06/03/19 1910     Magnesium 2.1 mg/dL     Protime-INR [[id number]]  (Normal) Collected:  06/03/19 1843    Specimen:  Blood Updated:  06/03/19 1907     Protime 13.3 Seconds      INR 0.98    CBC & Differential [[id number]] Collected:  06/03/19 1843    Specimen:  Blood Updated:  06/03/19 1858    Narrative:       The following orders were created for panel order CBC & Differential.  Procedure                               Abnormality         Status                     ---------                               -----------         ------                     CBC Auto Differential[[id number]]        Abnormal            Final result                 Please view results for these tests on the individual orders.    CBC Auto Differential [[id number]]  (Abnormal) Collected:  06/03/19 1843    Specimen:  Blood Updated:  06/03/19 1858     WBC 11.39 10*3/mm3      RBC 5.00 10*6/mm3      Hemoglobin 14.3 g/dL      Hematocrit 42.4 %      MCV 84.8 fL      MCH 28.6 pg      MCHC 33.7 g/dL      RDW 15.3 %      RDW-SD  47.6 fl      MPV 9.3 fL      Platelets 390 10*3/mm3      Neutrophil % 70.2 %      Lymphocyte % 23.1 %      Monocyte % 5.7 %      Eosinophil % 0.2 %      Basophil % 0.3 %      Immature Grans % 0.5 %      Neutrophils, Absolute 8.00 10*3/mm3      Lymphocytes, Absolute 2.63 10*3/mm3      Monocytes, Absolute 0.65 10*3/mm3      Eosinophils, Absolute 0.02 10*3/mm3      Basophils, Absolute 0.03 10*3/mm3      Immature Grans, Absolute 0.06 10*3/mm3      nRBC 0.0 /100 WBC           Lab Results (last 48 hours)     Procedure Component Value Units Date/Time    Valproic Acid Level, Total [[id number]]  (Normal) Collected:  06/05/19 0605    Specimen:  Blood Updated:  06/05/19 0649     Valproic Acid 72.4 mcg/mL     Urine Drug Screen - Urine, Clean Catch [[id number]]  (Normal) Collected:  06/04/19 0725    Specimen:  Urine, Clean Catch Updated:  06/04/19 0752     Amphetamine Screen, Urine Negative     Barbiturates Screen, Urine Negative     Benzodiazepine Screen, Urine Negative     Cocaine Screen, Urine Negative     Methadone Screen, Urine Negative     Opiate Screen Negative     Phencyclidine (PCP), Urine Negative     THC, Screen, Urine Negative    Narrative:       Negative Thresholds For Drugs Screened in Urine:    Amphetamines          500 ng/ml  Barbiturates          200 ng/ml  Benzodiazepines       200 ng/ml  Cocaine               150 ng/ml  Methadone             150 ng/ml  Opiates               300 ng/ml  Phencyclidine         25 ng/ml  THC                      50 ng/ml    The normal value for all drugs tested is negative. This report includes final unconfirmed screening results.  A positive result by this assay can be, at your request, sent to the Reference Lab for confirmation by gas chromatography. Unconfirmed results must not be used for non-medical purposes, such as employment or legal testing. Clinical consideration should be applied to any drug of abuse test result, particularly when unconfirmed results are used.    Valproic  Acid Level, Total [[id number]]  (Normal) Collected:  06/04/19 0617    Specimen:  Blood Updated:  06/04/19 0642     Valproic Acid 83.3 mcg/mL     Basic Metabolic Panel [[id number]]  (Abnormal) Collected:  06/03/19 2250    Specimen:  Blood Updated:  06/03/19 2308     Glucose 103 mg/dL      BUN 35 mg/dL      Creatinine 1.22 mg/dL      Sodium 143 mmol/L      Potassium 4.2 mmol/L      Chloride 114 mmol/L      CO2 19.0 mmol/L      Calcium 9.9 mg/dL      eGFR Non African Amer 62 mL/min/1.73      BUN/Creatinine Ratio 28.7     Anion Gap 10.0 mmol/L     Narrative:       GFR Normal >60  Chronic Kidney Disease <60  Kidney Failure <15    CBC Auto Differential [[id number]]  (Abnormal) Collected:  06/03/19 2250    Specimen:  Blood Updated:  06/03/19 2258     WBC 11.05 10*3/mm3      RBC 5.00 10*6/mm3      Hemoglobin 14.5 g/dL      Hematocrit 41.8 %      MCV 83.6 fL      MCH 29.0 pg      MCHC 34.7 g/dL      RDW 15.1 %      RDW-SD 46.0 fl      MPV 9.3 fL      Platelets 418 10*3/mm3      Neutrophil % 80.9 %      Lymphocyte % 16.3 %      Monocyte % 1.8 %      Eosinophil % 0.2 %      Basophil % 0.3 %      Immature Grans % 0.5 %      Neutrophils, Absolute 8.95 10*3/mm3      Lymphocytes, Absolute 1.80 10*3/mm3      Monocytes, Absolute 0.20 10*3/mm3      Eosinophils, Absolute 0.02 10*3/mm3      Basophils, Absolute 0.03 10*3/mm3      Immature Grans, Absolute 0.05 10*3/mm3      nRBC 0.0 /100 WBC     Levetiracetam Level (Keppra) [[id number]] Collected:  06/03/19 2250    Specimen:  Blood Updated:  06/03/19 2255    Ethanol [[id number]]  (Normal) Collected:  06/03/19 1843    Specimen:  Blood Updated:  06/03/19 2243     Ethanol % <0.010 %     Narrative:       Not for legal purposes. Chain of Custody not followed.     Valproic Acid Level, Total [[id number]]  (Normal) Collected:  06/03/19 1843    Specimen:  Blood Updated:  06/03/19 1913     Valproic Acid 73.7 mcg/mL     Comprehensive Metabolic Panel [[id number]]  (Abnormal) Collected:  06/03/19 1843     Specimen:  Blood Updated:  06/03/19 1910     Glucose 101 mg/dL      BUN 34 mg/dL      Creatinine 1.51 mg/dL      Sodium 143 mmol/L      Potassium 4.1 mmol/L      Chloride 109 mmol/L      CO2 23.0 mmol/L      Calcium 9.8 mg/dL      Total Protein 7.2 g/dL      Albumin 4.10 g/dL      ALT (SGPT) 26 U/L      AST (SGOT) 26 U/L      Alkaline Phosphatase 66 U/L      Total Bilirubin 0.4 mg/dL      eGFR Non African Amer 49 mL/min/1.73      Globulin 3.1 gm/dL      A/G Ratio 1.3 g/dL      BUN/Creatinine Ratio 22.5     Anion Gap 11.0 mmol/L     Narrative:       GFR Normal >60  Chronic Kidney Disease <60  Kidney Failure <15    Magnesium [[id number]]  (Normal) Collected:  06/03/19 1843    Specimen:  Blood Updated:  06/03/19 1910     Magnesium 2.1 mg/dL     Protime-INR [[id number]]  (Normal) Collected:  06/03/19 1843    Specimen:  Blood Updated:  06/03/19 1907     Protime 13.3 Seconds      INR 0.98    CBC & Differential [[id number]] Collected:  06/03/19 1843    Specimen:  Blood Updated:  06/03/19 1858    Narrative:       The following orders were created for panel order CBC & Differential.  Procedure                               Abnormality         Status                     ---------                               -----------         ------                     CBC Auto Differential[[id number]]        Abnormal            Final result                 Please view results for these tests on the individual orders.    CBC Auto Differential [[id number]]  (Abnormal) Collected:  06/03/19 1843    Specimen:  Blood Updated:  06/03/19 1858     WBC 11.39 10*3/mm3      RBC 5.00 10*6/mm3      Hemoglobin 14.3 g/dL      Hematocrit 42.4 %      MCV 84.8 fL      MCH 28.6 pg      MCHC 33.7 g/dL      RDW 15.3 %      RDW-SD 47.6 fl      MPV 9.3 fL      Platelets 390 10*3/mm3      Neutrophil % 70.2 %      Lymphocyte % 23.1 %      Monocyte % 5.7 %      Eosinophil % 0.2 %      Basophil % 0.3 %      Immature Grans % 0.5 %      Neutrophils, Absolute 8.00 10*3/mm3       Lymphocytes, Absolute 2.63 10*3/mm3      Monocytes, Absolute 0.65 10*3/mm3      Eosinophils, Absolute 0.02 10*3/mm3      Basophils, Absolute 0.03 10*3/mm3      Immature Grans, Absolute 0.06 10*3/mm3      nRBC 0.0 /100 WBC         ECG/EMG Results (last 48 hours)     ** No results found for the last 48 hours. **          Orders (last 48 hrs)     Start     Ordered    19 0900  allopurinol (ZYLOPRIM) tablet 300 mg  Daily      19 1739    19 0900  lacosamide (VIMPAT) tablet 50 mg  Every 12 Hours Scheduled,   Status:  Discontinued      19 21119 0900  lacosamide (VIMPAT) tablet 100 mg  Every 12 Hours Scheduled      19 2230  lacosamide (VIMPAT) tablet 50 mg  Once      19 174  furosemide (LASIX) tablet 20 mg  Daily PRN      19 1741    19 1535  PT Plan of Care Cert / Re-Cert  Once     Comments:  Physical Therapy Plan of Care  Initial Certification  Certification Period: 2019 - 2019    Patient Name: Lukasz Savage  : 1965    (D33.2) Benign neoplasm of brain, unspecified brain region (CMS/HCC)  (primary encounter diagnosis)  (R29.898) Left arm weakness  (R68.89) Decreased activities of daily living (ADL)  (R47.81) Slurred speech                  Assessment  PT Assessment  Criteria for Skilled Interventions Met (PT Clinical Impression): no                  Plan  PT Plan  Therapy Frequency (PT Clinical Impression): evaluation only  Outcome Summary: Pt. was in bed at start of evaluation. Pt. performed bed mobility and transfers independently. Pt. ambulated with supervision with decreased gait speed and L lateral lean possibly due to L sided seizures and weakness in L UE. He ascended and descended 10 steps with supervision with no handrails. PT recommends home or home with assist depending on pt.'s progression with treatment at hospital.         Silvina Grover, PT, DPT, NCS  2019            By cosigning this  order, either electronically or physically, I certify that the therapy services are furnished while this patient is under my care, the services outline above are required by this patient, and will be reviewed every 90 days.        M.D.:__________________________________________ Date: ______________    19 1534    19 1050  SLP Plan of Care Cert / Re-Cert  Once     Comments:  Speech Language Pathology Plan of Care  Initial Certification  Certification Period: 2019 - 2019    Patient Name: Lukasz Savage  : 1965    (D33.2) Benign neoplasm of brain, unspecified brain region (CMS/HCC)  (primary encounter diagnosis)  (R29.898) Left arm weakness  (R68.89) Decreased activities of daily living (ADL)  (R47.81) Slurred speech                Assessment  SLP Assessment  SLP Diagnosis: Slurred speech secondary to seizure  Rehab Potential/Prognosis: guarded  Plan of Care Reviewed With: patient, mother  SLC Criteria for Skilled Therapy Interventions Met: no problems identified which require skilled intervention                    Plan    SLP Plan  Therapy Frequency (SLP SLC): evaluation only  Predicted Duration Therapy Intervention (Days): until discharge        Silvina Lake, MS CCC-SLP  2019      By cosigning this order, either electronically or physically, I certify that the therapy services are furnished while this patient is under my care, the services outline above are required by this patient, and will be reviewed every 90 days.        M.D.:__________________________________________ Date: ______________    19 1049    19 0927  SLP Consult: Eval & Treat Other; Slurred speech  Once      19 09  allopurinol (ZYLOPRIM) tablet 100 mg  Daily,   Status:  Discontinued      19  aspirin chewable tablet 81 mg  Daily      19  colchicine tablet 0.6 mg  Daily      19 09  furosemide  (LASIX) tablet 20 mg  Daily,   Status:  Discontinued      06/03/19 2135 06/04/19 0900  losartan (COZAAR) tablet 100 mg  Daily      06/03/19 2135 06/04/19 0900  pantoprazole (PROTONIX) EC tablet 40 mg  Daily      06/03/19 2135 06/04/19 0900  spironolactone (ALDACTONE) tablet 25 mg  Daily      06/03/19 2135 06/04/19 0900  venlafaxine XR (EFFEXOR-XR) 24 hr capsule 150 mg  Daily      06/03/19 2135 06/04/19 0900  vitamin B-6 (PYRIDOXINE) tablet 100 mg  Daily      06/03/19 2135 06/04/19 0800  Inpatient Consult to Advance Care Planning  Once     Provider:  (Not yet assigned)    06/03/19 2153 06/04/19 0800  Inpatient Spiritual Care Consult  Once     Provider:  (Not yet assigned)    06/03/19 2156 06/04/19 0600  Valproic Acid Level, Total  Daily      06/03/19 2330 06/04/19 0130  dexamethasone (DECADRON) injection 4 mg  Every 6 Hours      06/03/19 1931 06/04/19 0000  Vital Signs  Every 4 Hours      06/03/19 2135 06/04/19 0000  Neuro Checks  Every 4 Hours      06/03/19 2135 06/03/19 2330  lacosamide (VIMPAT) 50 mg in sodium chloride 0.9 % 50 mL IVPB  Every 12 Hours Scheduled,   Status:  Discontinued      06/03/19 2315 06/03/19 2315  gadobenate dimeglumine (MULTIHANCE) injection 15 mL  Once in Imaging      06/03/19 2225 06/03/19 2230  carvedilol (COREG) tablet 25 mg  2 Times Daily With Meals      06/03/19 2135 06/03/19 2230  CloNIDine (CATAPRES) tablet 0.1 mg  Nightly      06/03/19 2135 06/03/19 2230  divalproex (DEPAKOTE) DR tablet 1,000 mg  2 Times Daily      06/03/19 2135 06/03/19 2230  levETIRAcetam (KEPPRA) tablet 1,500 mg  Every 12 Hours Scheduled      06/03/19 2135 06/03/19 2230  QUEtiapine (SEROquel) tablet 100 mg  Nightly      06/03/19 2135 06/03/19 2230  sodium chloride 0.9 % flush 3 mL  Every 12 Hours Scheduled      06/03/19 2135 06/03/19 2230  heparin (porcine) 5000 UNIT/ML injection 5,000 Units  Every 12 Hours Scheduled      06/03/19 2135 06/03/19 2221   Levetiracetam Level (Keppra)  Once      06/03/19 2223    06/03/19 2223  Ethanol  Once      06/03/19 2222 06/03/19 2222  Urine Drug Screen - Urine, Clean Catch  Once      06/03/19 2222 06/03/19 2146  Inpatient Neurology Consult General  Once     Specialty:  Neurology  Provider:  Doreen Zuniga MD    06/03/19 2146 06/03/19 2135  OT Consult: Eval & Treat arm weakness  Once      06/03/19 2135 06/03/19 2135  PT Consult: Eval & Treat arm weakness  Once      06/03/19 2135 06/03/19 2135  Basic Metabolic Panel  STAT      06/03/19 2135 06/03/19 2135  CBC Auto Differential  STAT      06/03/19 2135 06/03/19 2134  Place Sequential Compression Device  Once      06/03/19 2135 06/03/19 2134  Maintain Sequential Compression Device  Continuous      06/03/19 2135 06/03/19 2134  Ambulate Patient  Every Shift      06/03/19 2135 06/03/19 2134  Notify Provider (With Default Parameters)  Until Discontinued      06/03/19 2135 06/03/19 2133  Code Status and Medical Interventions:  Continuous      06/03/19 2135 06/03/19 2133  Intake & Output  Every Shift      06/03/19 2135 06/03/19 2133  Weigh Patient  Once      06/03/19 2135 06/03/19 2133  Oxygen Therapy- Nasal Cannula; Titrate for SPO2: 90% - 95%  Continuous,   Status:  Canceled      06/03/19 2135 06/03/19 2133  Insert Peripheral IV  Once      06/03/19 2135 06/03/19 2133  Saline Lock & Maintain IV Access  Continuous      06/03/19 2135 06/03/19 2132  sodium chloride 0.9 % flush 3-10 mL  As Needed      06/03/19 2135 06/03/19 2132  oxyCODONE-acetaminophen (PERCOCET)  MG per tablet 1 tablet  Every 4 Hours PRN      06/03/19 2135 06/03/19 2130  cyclobenzaprine (FLEXERIL) tablet 10 mg  3 Times Daily PRN      06/03/19 2135 06/03/19 2130  EEG  STAT      06/03/19 2129 06/03/19 2129  ALPRAZolam (XANAX) tablet 0.25 mg  2 Times Daily PRN      06/03/19 2135 06/03/19 2129  Diet Regular  Diet Effective Now      06/03/19 2128  "   06/03/19 1935  Inpatient Admission  Once      06/03/19 1934    06/03/19 1933  dexamethasone (DECADRON) injection 10 mg  Once      06/03/19 1931 06/03/19 1932  EEG  Once,   Status:  Canceled      06/03/19 1931    06/03/19 1930  MRI Brain With & Without Contrast  1 Time Imaging      06/03/19 1931    06/03/19 1824  Valproic Acid Level, Total  Once      06/03/19 1823    06/03/19 1823  CBC & Differential  Once      06/03/19 1823    06/03/19 1823  Comprehensive Metabolic Panel  Once      06/03/19 1823    06/03/19 1823  Protime-INR  Once      06/03/19 1823    06/03/19 1823  Magnesium  STAT      06/03/19 1823    06/03/19 1823  CT Head Without Contrast  1 Time Imaging      06/03/19 1823    06/03/19 1823  CBC Auto Differential  PROCEDURE ONCE      06/03/19 1823    --  allopurinol (ZYLOPRIM) 300 MG tablet  Daily      06/04/19 1622    --  furosemide (LASIX) 20 MG tablet  Daily PRN      06/04/19 1622    --  losartan (COZAAR) 100 MG tablet  Daily      06/04/19 1625    --  spironolactone (ALDACTONE) 25 MG tablet  Daily      06/04/19 1626           Physician Progress Notes (last 48 hours) (Notes from 6/3/2019  8:33 AM through 6/5/2019  8:33 AM)      Tommie Thornton APRN at 6/5/2019  7:51 AM          Lukasz Savage  54 y.o.      Chief complaint:   No complaints    Subjective  No events    Temp:  [97.6 °F (36.4 °C)-98.6 °F (37 °C)] 98 °F (36.7 °C)  Heart Rate:  [75-78] 78  Resp:  [16-20] 16  BP: (111-146)/(72-84) 127/72      Objective:  General Appearance:  Comfortable, well-appearing, in no acute distress and not in pain.    Vital signs: (most recent): Blood pressure 127/72, pulse 78, temperature 98 °F (36.7 °C), temperature source Oral, resp. rate 16, height 172.7 cm (68\"), weight 83.9 kg (185 lb), SpO2 98 %.  Vital signs are normal.  No fever.    Output: Producing urine.    HEENT: Normal HEENT exam.    Lungs:  Normal effort and normal respiratory rate.    Heart: Normal rate.  Regular rhythm.    Chest: Symmetric " chest wall expansion.   Extremities: Normal range of motion.    Skin:  Warm and dry.    Abdomen: Abdomen is soft and non-distended.  Bowel sounds are normal.   There is no abdominal tenderness.     Pulses: Distal pulses are intact.        Neurologic Exam     Mental Status   Oriented to person, place, and time.   Attention: normal. Concentration: normal.   Speech: speech is normal   Level of consciousness: alert    Cranial Nerves   Cranial nerves II through XII intact.     Motor Exam   Muscle bulk: normal    Strength   Strength 5/5 throughout.     Sensory Exam   Light touch normal.     Gait, Coordination, and Reflexes     Gait  Gait: normal        Benign neoplasm of brain (CMS/HCC)      Lab Results (last 24 hours)     Procedure Component Value Units Date/Time    Valproic Acid Level, Total [[id number]]  (Normal) Collected:  06/05/19 0605    Specimen:  Blood Updated:  06/05/19 0649     Valproic Acid 72.4 mcg/mL     Urine Drug Screen - Urine, Clean Catch [[id number]]  (Normal) Collected:  06/04/19 0725    Specimen:  Urine, Clean Catch Updated:  06/04/19 0752     Amphetamine Screen, Urine Negative     Barbiturates Screen, Urine Negative     Benzodiazepine Screen, Urine Negative     Cocaine Screen, Urine Negative     Methadone Screen, Urine Negative     Opiate Screen Negative     Phencyclidine (PCP), Urine Negative     THC, Screen, Urine Negative    Narrative:       Negative Thresholds For Drugs Screened in Urine:    Amphetamines          500 ng/ml  Barbiturates          200 ng/ml  Benzodiazepines       200 ng/ml  Cocaine               150 ng/ml  Methadone             150 ng/ml  Opiates               300 ng/ml  Phencyclidine         25 ng/ml  THC                      50 ng/ml    The normal value for all drugs tested is negative. This report includes final unconfirmed screening results.  A positive result by this assay can be, at your request, sent to the Reference Lab for confirmation by gas chromatography. Unconfirmed  results must not be used for non-medical purposes, such as employment or legal testing. Clinical consideration should be applied to any drug of abuse test result, particularly when unconfirmed results are used.              Plan:   No seizures overnight.  Patient is working with neurologist regarding adjusting of the medications.  From a neurosurgical standpoint the patient can be discharged whenever neurologist is satisfied with medication adjustments and treatments.  Patient hopefully to start chemo and radiation next week.    EPI Morales      Electronically signed by Tommie Thornton APRN at 6/5/2019  7:54 AM     Doreen Zuniga MD at 6/4/2019  9:15 PM            Neurology Progress Note      Date of admission: 6/3/2019  5:58 PM  Date of visit: 6/4/2019    Chief Complaint:  F/u seizure    Subjective     Subjective:    He has not had any more seizures except some left facial twitching. His left arm is a bit stronger but he has received increased steroids. EEG showed slowing but there were no epileptiform discharges. He is tolerating Vimpat    He is to start on chemo next week -or when it arrives-and radiation treatments once the chemo comes in.  Medications:  Current Facility-Administered Medications   Medication Dose Route Frequency Provider Last Rate Last Dose   • [START ON 6/5/2019] allopurinol (ZYLOPRIM) tablet 300 mg  300 mg Oral Daily Delio Crowe MD       • ALPRAZolam (XANAX) tablet 0.25 mg  0.25 mg Oral BID PRN Delio Crowe MD   0.25 mg at 06/04/19 0952   • aspirin chewable tablet 81 mg  81 mg Oral Daily Delio Crowe MD   81 mg at 06/04/19 0950   • carvedilol (COREG) tablet 25 mg  25 mg Oral BID With Meals Delio Crowe MD   25 mg at 06/04/19 1826   • CloNIDine (CATAPRES) tablet 0.1 mg  0.1 mg Oral Nightly Delio Crowe MD   0.1 mg at 06/03/19 2238   • colchicine tablet 0.6 mg  0.6 mg Oral Daily Delio Crowe MD       •  cyclobenzaprine (FLEXERIL) tablet 10 mg  10 mg Oral TID PRN Delio Crowe MD       • dexamethasone (DECADRON) injection 4 mg  4 mg Intravenous Q6H Baljinder Zamarripa MD   4 mg at 06/04/19 1830   • divalproex (DEPAKOTE) DR tablet 1,000 mg  1,000 mg Oral BID Delio Crowe MD   1,000 mg at 06/04/19 0951   • furosemide (LASIX) tablet 20 mg  20 mg Oral Daily PRN Delio Crowe MD       • heparin (porcine) 5000 UNIT/ML injection 5,000 Units  5,000 Units Subcutaneous Q12H Delio Crowe MD   5,000 Units at 06/04/19 0952   • lacosamide (VIMPAT) 50 mg in sodium chloride 0.9 % 50 mL IVPB  50 mg Intravenous Q12H Doreen Zuniga  mL/hr at 06/04/19 1038 50 mg at 06/04/19 1038   • levETIRAcetam (KEPPRA) tablet 1,500 mg  1,500 mg Oral Q12H Delio Crowe MD   1,500 mg at 06/04/19 0951   • losartan (COZAAR) tablet 100 mg  100 mg Oral Daily Delio Crowe MD   100 mg at 06/04/19 0950   • oxyCODONE-acetaminophen (PERCOCET)  MG per tablet 1 tablet  1 tablet Oral Q4H PRN Delio Crowe MD       • pantoprazole (PROTONIX) EC tablet 40 mg  40 mg Oral Daily Delio Crowe MD   40 mg at 06/04/19 0951   • QUEtiapine (SEROquel) tablet 100 mg  100 mg Oral Nightly Delio Crowe MD   100 mg at 06/03/19 2238   • sodium chloride 0.9 % flush 3 mL  3 mL Intravenous Q12H Delio Crowe MD   3 mL at 06/04/19 0952   • sodium chloride 0.9 % flush 3-10 mL  3-10 mL Intravenous PRN Delio Crowe MD       • spironolactone (ALDACTONE) tablet 25 mg  25 mg Oral Daily Delio Crowe MD   25 mg at 06/04/19 0952   • venlafaxine XR (EFFEXOR-XR) 24 hr capsule 150 mg  150 mg Oral Daily Delio Crowe MD   150 mg at 06/04/19 0952   • vitamin B-6 (PYRIDOXINE) tablet 100 mg  100 mg Oral Daily Delio Crowe MD   100 mg at 06/04/19 0951       Review of Systems:   -A 14 point review of systems is completed and is negative  except for 2 episodes of right face twitching today    Objective     Objective      Vital Signs  Temp:  [97.6 °F (36.4 °C)-98.2 °F (36.8 °C)] 97.6 °F (36.4 °C)  Heart Rate:  [70-87] 76  Resp:  [18-20] 20  BP: (111-146)/(71-84) 111/78    Physical Exam:    HEENT:  Neck supple  CVS:  Regular rate and rhythm.  No murmurs  Carotid Examination:  No bruits  Lungs:  Clear to auscultation  Abdomen:  Non-tender, Non-distended  Extremities:  No signs of peripheral edema    Neurologic Exam:    -Awake, Alert, Oriented X 3  -No word finding difficulties  -No aphasia  -No dysarthria  -Follows simple and complex commands    Cranial nerves II through XII intact. EOMI No facial sensory asymmetry.  Subtle decreased in left smile  Hearing intact bilaterally. Tongue protrudes midline    Motor: (strength out of 5:  1= minimal movement, 2 = movement in plane of gravity, 3 = movement against gravity, 4 = movement against some resistance, 5 = full strength)    -Right Upper Ext: Proximal: 5 Distal: 5  -Left Upper Ext: Proximal: 5 Distal: 4+--hand  is stronger than yesterday Wrist dorsiflexion 5/5 and interossei 4+/5    -Right Lower Ext: Proximal: 5 Distal: 5  -Left Lower Ext: Proximal: 5 Distal: 5    DTR:  2+ throughout in all four extremities    Sensory:  -Intact to light touch, pinprick, temperature, pain, and proprioception    Coordination/Gait:  -No ataxia     Results Review:    I reviewed the patient's new clinical results.    Lab Results (last 24 hours)     Procedure Component Value Units Date/Time    Urine Drug Screen - Urine, Clean Catch [[id number]]  (Normal) Collected:  06/04/19 0733    Specimen:  Urine, Clean Catch Updated:  06/04/19 0750     Amphetamine Screen, Urine Negative     Barbiturates Screen, Urine Negative     Benzodiazepine Screen, Urine Negative     Cocaine Screen, Urine Negative     Methadone Screen, Urine Negative     Opiate Screen Negative     Phencyclidine (PCP), Urine Negative     THC, Screen, Urine Negative     Narrative:       Negative Thresholds For Drugs Screened in Urine:    Amphetamines          500 ng/ml  Barbiturates          200 ng/ml  Benzodiazepines       200 ng/ml  Cocaine               150 ng/ml  Methadone             150 ng/ml  Opiates               300 ng/ml  Phencyclidine         25 ng/ml  THC                      50 ng/ml    The normal value for all drugs tested is negative. This report includes final unconfirmed screening results.  A positive result by this assay can be, at your request, sent to the Reference Lab for confirmation by gas chromatography. Unconfirmed results must not be used for non-medical purposes, such as employment or legal testing. Clinical consideration should be applied to any drug of abuse test result, particularly when unconfirmed results are used.    Valproic Acid Level, Total [[id number]]  (Normal) Collected:  06/04/19 0617    Specimen:  Blood Updated:  06/04/19 0642     Valproic Acid 83.3 mcg/mL     Basic Metabolic Panel [[id number]]  (Abnormal) Collected:  06/03/19 2250    Specimen:  Blood Updated:  06/03/19 2308     Glucose 103 mg/dL      BUN 35 mg/dL      Creatinine 1.22 mg/dL      Sodium 143 mmol/L      Potassium 4.2 mmol/L      Chloride 114 mmol/L      CO2 19.0 mmol/L      Calcium 9.9 mg/dL      eGFR Non African Amer 62 mL/min/1.73      BUN/Creatinine Ratio 28.7     Anion Gap 10.0 mmol/L     Narrative:       GFR Normal >60  Chronic Kidney Disease <60  Kidney Failure <15    CBC Auto Differential [[id number]]  (Abnormal) Collected:  06/03/19 2250    Specimen:  Blood Updated:  06/03/19 2258     WBC 11.05 10*3/mm3      RBC 5.00 10*6/mm3      Hemoglobin 14.5 g/dL      Hematocrit 41.8 %      MCV 83.6 fL      MCH 29.0 pg      MCHC 34.7 g/dL      RDW 15.1 %      RDW-SD 46.0 fl      MPV 9.3 fL      Platelets 418 10*3/mm3      Neutrophil % 80.9 %      Lymphocyte % 16.3 %      Monocyte % 1.8 %      Eosinophil % 0.2 %      Basophil % 0.3 %      Immature Grans % 0.5 %      Neutrophils,  Absolute 8.95 10*3/mm3      Lymphocytes, Absolute 1.80 10*3/mm3      Monocytes, Absolute 0.20 10*3/mm3      Eosinophils, Absolute 0.02 10*3/mm3      Basophils, Absolute 0.03 10*3/mm3      Immature Grans, Absolute 0.05 10*3/mm3      nRBC 0.0 /100 WBC     Levetiracetam Level (Keppra) [[id number]] Collected:  06/03/19 2250    Specimen:  Blood Updated:  06/03/19 2255    Ethanol [[id number]]  (Normal) Collected:  06/03/19 1843    Specimen:  Blood Updated:  06/03/19 2243     Ethanol % <0.010 %     Narrative:       Not for legal purposes. Chain of Custody not followed.         Imaging Results (last 24 hours)     Procedure Component Value Units Date/Time    MRI Brain With & Without Contrast [[id number]] Collected:  06/04/19 0755     Updated:  06/04/19 0815    Narrative:       EXAM: MRI BRAIN W WO CONTRAST- - 6/3/2019 9:49 PM CDT     HISTORY: new L arm weakness; D33.2-Benign neoplasm of brain,  unspecified; R29.898-Other symptoms and signs involving the  musculoskeletal system. 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     COMPARISON: 06/03/2019.      TECHNIQUE: Routine protocol MRI performed of the brain without and with  intravenous contrast.     FINDINGS:  There is a masslike appearance at the right frontal lobe, parafalcine  location measuring about 2.9 x 1.4 cm, previously 1.7 x 1.1 cm, with  mass effect and possible extension into the corpus callosum. The  aforementioned mass does efface the adjacent sulci, but there is no  significant midline shift. The second previously described larger area  has indistinct borders and is difficult to measure, appearing similar to  slightly increased in size and involves the right motor cortex. There is  associated faint enhancement and mild restricted diffusion. The mass  lesions do not appear to cross midline. There is some questionable  precontrast T1 hyperintensity such as  axial series serial one image 1  22-99. This could represent hemorrhage/necrosis,  postbiopsy/posttreatment changes or possibly artifact. On T2 star  series, the biopsy tract is visualized.     Ventricles, cisterns and sulci are normal in size and configuration.  Sella within normal limits. Brainstem and posterior fossa are within  normal limits.      Visualized contrasted vessels and noncontrasted flow voids are within  normal limits. Visualized portions of the orbits, paranasal sinuses and  mastoid air cells are within normal limits. Visualized overlying soft  tissues within normal limits.          Impression:       1. Increased size of right frontal lobe mass lesions compared to  04/02/2019, which are described in more detail above.     This report was finalized on 06/04/2019 08:12 by Dr Sindy Mota MD.          Assessment/Plan     Hospital Problem List      Benign neoplasm of brain (CMS/HCC)    Impression:  1. Seizure disorder  2. Astrocytoma --larger than before. Planning chemo and radiation treatment perhaps next week.       Plan:     · Increase Vimpat to 100 mg every 12--may not tolerate this rapid of increase but we can see how he does.  · Give extra 50 mg Vimpat tonight to equal 100 mg tonight  · Begin 100 mg every 12 hours tomorrow.   · Monitor Valproate levels --they are increasing and may be increasing too fast.  Not a steady state yet and we may have to back off.   · Will need to monitor liver functions and ammonia levels over time.   · If he continues to have seizures will obtain a continuous EEG.        Doreen Parker MD  06/04/19  9:15 PM        Electronically signed by Doreen Zuniga MD at 6/4/2019  9:35 PM          Consult Notes (last 48 hours) (Notes from 6/3/2019  8:33 AM through 6/5/2019  8:33 AM)      Doreen Zuniga MD at 6/3/2019 10:09 PM      Consult Orders    1. Inpatient Neurology Consult General [[id number]] ordered by Delio Crowe MD at 06/03/19  2146                    Neurology Consult Note    Patient:  Lukasz Savage   YOB: 1965  MRN:  [id number]  Date of Admission:  6/3/2019  5:58 PM    Date: 6/3/2019    Referring Provider: Delio Crowe MD   Reason for Consultation: focal partial seizures, on 2 agents      History of present illness:     This is a 54 y.o. 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  Upon his arrival to ED he presented with left arm weakness.  He is on Depakote and Keppra. His Valproate level was 73, magnesium level was 2.1.     He used to drink significantly and in fact patient first began having seizures in 7/3/2017 and was thought to have PRES due to hypertension on MRI and during that hospitalization he went through alcohol withdrawal as well as DT's His daughter at that time reported he drank a case of beer a day.He was started on Keppra at that time which was later weaned off of in September 2017. .  He had a F/u MRI on 8/29/2017 which showed reversal of PRES and no sign of brain tumor by report.   He had no further seizures until February of 2019 when he developed left facial with left upper extremity twitching and restarted on Keppra but he also noted speech difficulties and concentration problems. He was found to have an abnormal MRI in April 2019 with a right frontal lobe lesion as well as a second lesion subcortical white matter of the paramedian posterior right  frontal lobe and underwent a right frontal stereotactic brain biopsy on April 15 2019 with pathology showing grade 3 anaplastic astrocytoma.     He was seen by Dr Minor for radiation treatments and Dr Trevino neurosurgery who performed the biopsy.    He has been followed in Neurology by Galen Todd PA-C and he was placed back on Keppra increased to 1500 mg BID as seizures continued   He was placed on Aptiom 800 mg and  then increased to 1600 mg but seizures continued and then on Depakote 500 mg BID and increased on 5/31/2019 to 1000 mg BID and apparently off of Aptiom  Wife complained patient slept most of the day and still had seizures of left facial twitching and left arm twitching.  He noticed that for past 3 days his left arm was weak and he woke up today with left arm weakness.     Patient also has migraine headaches from review of records.     Currently patient states his last seizure was 1.5 hours ago and lasted 3 minutes consisting of left facial and arm twitching    Patient has had 3 EEGs since last July:   1. EEG 3/27/19:  Interpretation:  This is a normal awake  EEG    2. EEG 10/3/2017:  Findings: Normal awake EEG  3. EEG 7/10/2017  This is an abnormal EEG recording secondary to slowing of the background rhythm.  This could be consistent with a metabolic or medication related encephalopathy.  No definitive seizure activity was noted.  Clinical correlation is recommended.    Past Medical History:   Diagnosis Date   • Angio-edema 7/3/2017   • Anxiety    • Arthritis    • Cancer (CMS/[facility]) 05/09/2019    Brain cancer diagnoised yesterday  Dr Trevino    • Clostridium difficile colitis    • Erectile dysfunction 10/6/2016   • GERD (gastroesophageal reflux disease)    • Gout    • HCAP (healthcare-associated pneumonia) 7/11/2017   • Hyperlipidemia    • Malignant hypertension    • MSSA (methicillin susceptible Staphylococcus aureus) infection 7/31/2017   • Obstructive sleep apnea    • Seizures (CMS/[facility]) 7/4/2017       Past Surgical History:   Procedure Laterality Date   • COLONOSCOPY     • CRANIOTOMY Right 4/15/2019    Procedure: CRANIOTOMY WITH BIOPSY RIGHT;  Surgeon: Dillon Trevino MD;  Location: [facility] OR;  Service: Neurosurgery   • SHOULDER ARTHROSCOPY Left    • TRACHEOSTOMY N/A 7/12/2017    Procedure: TRACHEOSTOMY WITH TRACHEOSCOPY;  Surgeon: Jairon Pierson MD;  Location: [facility] OR;  Service:        Prior to Admission  medications    Medication Sig Start Date End Date Taking? Authorizing Provider   allopurinol (ZYLOPRIM) 100 MG tablet Take 100 mg by mouth Daily.   Yes Phil Constantino MD   ALPRAZolam (XANAX) 0.25 MG tablet Take 1 tablet by mouth 2 (Two) Times a Day As Needed for Anxiety. 5/20/19  Yes Linda Hoffman DNP, APRN   aspirin 81 MG tablet Take 1 tablet by mouth Daily. 2/28/19  Yes Royal Todd PA   carvedilol (COREG) 25 MG tablet Take 1 tablet by mouth 2 (Two) Times a Day With Meals. 5/20/19  Yes Linda Hoffman DNP, APRN   CloNIDine (CATAPRES) 0.1 MG tablet Take 1 tablet by mouth Every Night. Check BP 4x daily. If> 180 SBP take x1. 5/20/19  Yes Linda Hoffman DNP, APRN   colchicine 0.6 MG tablet Take 2 tabs now, repeat 1 tab in an hour.  May repeat same steps again in 24 hours if needed.  He has chronic gout 11/20/18  Yes Linda Hoffman DNP, APRN   cyclobenzaprine (FLEXERIL) 10 MG tablet Take 1 tablet by mouth 3 (Three) Times a Day As Needed for Muscle Spasms. 5/17/19  Yes Oral Jessica III, MD   dexamethasone (DECADRON) 1 MG tablet  5/1/19  Yes ProviderPhil MD   divalproex (DEPAKOTE) 500 MG DR tablet Take 2 tablets by mouth 2 (Two) Times a Day. 5/31/19  Yes Royal Todd PA   fenofibrate (TRICOR) 145 MG tablet Take 1 tablet by mouth Daily. 5/20/19  Yes Linda Hoffman DNP, APRN   furosemide (LASIX) 20 MG tablet TAKE ONE TABLET BY MOUTH DAILY  Patient taking differently: prn 12/17/18  Yes Linda Hoffman DNP, APRN   levETIRAcetam (KEPPRA) 750 MG tablet Take 2 tablets by mouth 2 (Two) Times a Day. 4/16/19  Yes Tommie Thornton APRN   losartan (COZAAR) 100 MG tablet TAKE ONE TABLET BY MOUTH DAILY FOR HIGH BLOOD PRESSURE 4/29/19  Yes Dalton, Crys, DNP, APRN   oxyCODONE-acetaminophen (PERCOCET)  MG per tablet Take 1 tablet by mouth Every 4 (Four) Hours As Needed for Moderate Pain . 5/17/19  Yes Oral Jessica III, MD   QUEtiapine (SEROquel) 100  MG tablet Take 1 tablet by mouth every night at bedtime. 2/22/19  Yes Linda Hoffman DNP, APRN   spironolactone (ALDACTONE) 25 MG tablet TAKE ONE TABLET BY MOUTH DAILY 4/29/19  Yes Linda Hoffman DNP, APRN   venlafaxine XR (EFFEXOR-XR) 150 MG 24 hr capsule Take 1 capsule by mouth Daily. 5/20/19  Yes Linda Hoffman DNP, APRN   vitamin B-6 (PYRIDOXINE) 100 MG tablet Take 1 tablet by mouth Daily. 4/3/19  Yes Royal Todd PA   levETIRAcetam (KEPPRA) 500 MG tablet  5/1/19   Provider, MD Phil   Omega-3 Fatty Acids (FISH OIL) 1000 MG capsule capsule Take 2 capsules by mouth Daily With Breakfast. 5/20/19   Linda Hoffman DNP, APRN   omeprazole (PRILOSEC) 10 MG capsule Take 1 capsule by mouth Daily. For acid reflux 5/20/19   Linda Hoffman DNP, APRN       Hospital scheduled medications:     [START ON 6/4/2019] allopurinol 100 mg Oral Daily   [START ON 6/4/2019] aspirin 81 mg Oral Daily   carvedilol 25 mg Oral BID With Meals   CloNIDine 0.1 mg Oral Nightly   [START ON 6/4/2019] colchicine 0.6 mg Oral Daily   [START ON 6/4/2019] dexamethasone 4 mg Intravenous Q6H   divalproex 1,000 mg Oral BID   [START ON 6/4/2019] furosemide 20 mg Oral Daily   heparin (porcine) 5,000 Units Subcutaneous Q12H   levETIRAcetam 1,500 mg Oral Q12H   [START ON 6/4/2019] losartan 100 mg Oral Daily   [START ON 6/4/2019] pantoprazole 40 mg Oral Daily   QUEtiapine 100 mg Oral Nightly   sodium chloride 3 mL Intravenous Q12H   [START ON 6/4/2019] spironolactone 25 mg Oral Daily   [START ON 6/4/2019] venlafaxine  mg Oral Daily   [START ON 6/4/2019] vitamin B-6 100 mg Oral Daily     Hospital PRN medications:  ALPRAZolam  •  cyclobenzaprine  •  oxyCODONE-acetaminophen  •  sodium chloride    Allergies   Allergen Reactions   • Lipitor [Atorvastatin] Rash   • Lisinopril Angioedema     Swell tongue       Social History     Socioeconomic History   • Marital status: Single     Spouse name: Not on file   • Number of  children: 2   • Years of education: Not on file   • Highest education level: Not on file   Occupational History   • Occupation: ELECTRIAL WORK   Tobacco Use   • Smoking status: Former Smoker     Packs/day: 0.33     Years: 30.00     Pack years: 9.90     Types: Cigarettes     Last attempt to quit: 7/3/2017     Years since quittin.9   • Smokeless tobacco: Never Used   Substance and Sexual Activity   • Alcohol use: No     Frequency: Never     Comment: used to drink alot but now just ocasional beer since has seizure in 2017   • Drug use: No   • Sexual activity: Defer     Family History   Problem Relation Age of Onset   • Hypertension Mother    • Diabetes Mother    • Heart disease Father    • Hypertension Father    • No Known Problems Daughter    • No Known Problems Son    • Diabetes Maternal Grandmother    • No Known Problems Maternal Grandfather    • No Known Problems Paternal Grandmother    • Heart attack Paternal Grandfather    • Kidney disease Sister        Review of Systems  A 14 point review of systems was reviewed and was negative except for weakness of left arm  He denies headache    Vital Signs   Temp:  [97.8 °F (36.6 °C)-97.9 °F (36.6 °C)] 97.8 °F (36.6 °C)  Heart Rate:  [75-89] 78  Resp:  [18] 18  BP: (118-143)/(85-98) 143/90    General Exam:  Head:  Normal cephalic,-  HEENT:  Neck supple  CVS:  Regular rate and rhythm.  No murmurs  Carotid Examination:  No bruits  Lungs:  Clear to auscultation  Abdomen:  Non-tender, Non-distended  Extremities:  No signs of peripheral edema  Skin:  No rashes    Neurologic Exam:    Mental Status:    -Awake, Alert, Oriented  To person, place.  Initially thought it was may then corrected to Edyta 3, 2019. Thought it was  or Monday (it's Monday) Knew it was Spring and that the president was TrSAFCell. -No word finding difficulties  -No aphasia  -No dysarthria  -Follows simple and complex commands    CN II:  Visual fields full.  Pupils equally reactive to light  CN III, IV,  VI:  Extraocular Muscles full with no signs of nystagmus  CN V:  Facial sensory is symmetric   CN VII:  Facial motor asymmetric smile in that the right side will lag behind the left side but otherwise smile is symmetric.   CN VIII:  Gross hearing intact bilaterally  CN IX/X:  Palate elevates symmetrically  CN XI:  Shoulder shrug symmetric  CN XII:  Tongue is midline on protrusion    Motor: (strength out of 5:  1= minimal movement, 2 = movement in plane of gravity, 3 = movement against gravity, 4 = movement against some resistance, 5 = full strength)    -Right Upper Ext: Proximal: 5 Distal: 5  -Left Upper Ext: Proximal: 5 Distal: wrist flex/extension 5/5 but interossei and hand  is weak--3-    -Right Lower Ext: Proximal: 5 Distal: 5  -Left Lower Ext: Proximal: 5 Distal: 5    DTR:  -Right   Bicep: 2+ Triceps: 2+ Brachioradialis: 2+   Patella: 2+ Ankle: 2+ Neg Babinski  -Left   Bicep: 3+ Triceps: 3+ Brachioradialis: 3+   Patella: 3+ Ankle: 2+ Neg Babinski    Sensory:  -Intact to light touch, pinprick    Coordination:  -Finger to nose intact  -Heel to shin intact  Fine finger movement--none on the left and normal on the right  -No ataxia    Gait  -Not tested for safety reasons      Results Review:  Lab Results (last 7 days)     Procedure Component Value Units Date/Time    Valproic Acid Level, Total [[id number]]  (Normal) Collected:  06/03/19 1843    Specimen:  Blood Updated:  06/03/19 1913     Valproic Acid 73.7 mcg/mL     Comprehensive Metabolic Panel [[id number]]  (Abnormal) Collected:  06/03/19 1843    Specimen:  Blood Updated:  06/03/19 1910     Glucose 101 mg/dL      BUN 34 mg/dL      Creatinine 1.51 mg/dL      Sodium 143 mmol/L      Potassium 4.1 mmol/L      Chloride 109 mmol/L      CO2 23.0 mmol/L      Calcium 9.8 mg/dL      Total Protein 7.2 g/dL      Albumin 4.10 g/dL      ALT (SGPT) 26 U/L      AST (SGOT) 26 U/L      Alkaline Phosphatase 66 U/L      Total Bilirubin 0.4 mg/dL      eGFR Non  Amer 49  mL/min/1.73      Globulin 3.1 gm/dL      A/G Ratio 1.3 g/dL      BUN/Creatinine Ratio 22.5     Anion Gap 11.0 mmol/L     Narrative:       GFR Normal >60  Chronic Kidney Disease <60  Kidney Failure <15    Magnesium [[id number]]  (Normal) Collected:  06/03/19 1843    Specimen:  Blood Updated:  06/03/19 1910     Magnesium 2.1 mg/dL     Protime-INR [[id number]]  (Normal) Collected:  06/03/19 1843    Specimen:  Blood Updated:  06/03/19 1907     Protime 13.3 Seconds      INR 0.98    CBC & Differential [[id number]] Collected:  06/03/19 1843    Specimen:  Blood Updated:  06/03/19 1858    Narrative:       The following orders were created for panel order CBC & Differential.  Procedure                               Abnormality         Status                     ---------                               -----------         ------                     CBC Auto Differential[[id number]]        Abnormal            Final result                 Please view results for these tests on the individual orders.    CBC Auto Differential [[id number]]  (Abnormal) Collected:  06/03/19 1843    Specimen:  Blood Updated:  06/03/19 1858     WBC 11.39 10*3/mm3      RBC 5.00 10*6/mm3      Hemoglobin 14.3 g/dL      Hematocrit 42.4 %      MCV 84.8 fL      MCH 28.6 pg      MCHC 33.7 g/dL      RDW 15.3 %      RDW-SD 47.6 fl      MPV 9.3 fL      Platelets 390 10*3/mm3      Neutrophil % 70.2 %      Lymphocyte % 23.1 %      Monocyte % 5.7 %      Eosinophil % 0.2 %      Basophil % 0.3 %      Immature Grans % 0.5 %      Neutrophils, Absolute 8.00 10*3/mm3      Lymphocytes, Absolute 2.63 10*3/mm3      Monocytes, Absolute 0.65 10*3/mm3      Eosinophils, Absolute 0.02 10*3/mm3      Basophils, Absolute 0.03 10*3/mm3      Immature Grans, Absolute 0.06 10*3/mm3      nRBC 0.0 /100 WBC           .  Imaging Results (last 24 hours)     Procedure Component Value Units Date/Time    CT Head Without Contrast [[id number]] Collected:  06/03/19 1925     Updated:  06/03/19 1934     Narrative:       EXAMINATION: CT HEAD WO CONTRAST- 6/3/2019 7:25 PM CDT     HISTORY: Bilateral upper extremity weakness, history of brain tumor  (anaplastic infiltrating glioma) and previous right-sided frontal  craniotomy.     DOSE: 633 mGycm (Automatic exposure control technique was implemented in  an effort to keep the radiation dose as low as possible without  compromising image quality)     REPORT: Spiral CT of the head was performed without contrast,  reconstructed coronal and sagittal images are also reviewed.     COMPARISON: CT the head without contrast 04/15/2019, MRI of the brain  with without contrast 04/02/2019.     There is evidence of previous right craniotomy, with resolution of a  small amount of pneumocephalus is seen on the previous head CT. There  are 2 ill-defined masslike areas of increased attenuation within the  right frontal lobe, largest is medially and abuts the falx, just  superior to the right frontal horn. This measures approximately 3.3 cm,  appears to significantly increase in size compared with the previous  head CT, when it measured approximately 1.8 cm. This mass appears to  involve the corpus callosum on the right and has mild mass effect on the  frontal horn. There are areas of increased attenuation is in the lateral  aspect of the right frontal lobe and is ill-defined, measuring up to 1.5  cm. This also appears slightly increased compared with the previous CT.  The ventricles and basal cisterns are within normal limits and there is  no hydrocephalus. No intracranial hemorrhage is identified. There is no  midline shift or evidence of tonsillar or uncal herniation.       Impression:       1. Evidence of previous right craniotomy for biopsy of 2 right frontal  masses which appear increased in size compared with the previous  examinations, concerning for tumor progression. No intracranial  hemorrhage or midline shift is identified.     This report was finalized on 06/03/2019 19:31 by  Dr. Marvin Mar MD.              Impression    1. Focal motor seizures of left upper and left face  2. Grade 3 anaplastic astrocytoma  3. Left upper extremity/left facial  weakness may be Melchor's or extension of tumor.  3. Untreated sleep apnea ---may lower seizure threshold.     Plan    · Agree with EEG--obtain at 7 AM   · Add IV Vimpat for tonight--will need to determine if his insurance will cover this   · Obtain Keppra level  · Daily valproate levels  · UDS  · ETOH level      I discussed the patients findings and my recommendations with patient and nursing staff    Doreen Parker MD  06/03/19  10:09 PM      Electronically signed by Doreen Zuniga MD at 6/3/2019 11:31 PM

## 2019-06-05 NOTE — PROGRESS NOTES
Neurology Progress Note      Date of admission: 6/3/2019  5:58 PM  Date of visit: 6/5/2019    Chief Complaint:  F/u seizures    Subjective     Subjective:    Patient started on Vimpat and continues on Keppra and Depakote. He is noting increased sedation but no longer having seizures except in left neck that will twitch.  He no longer is having twitching of face or left arm and left arm is stronger with the steroids and no further seizure  He has had 6 events since yesterday but all lasting under 1 minute and only involving neck muscles.   Mother states it can spread to face and then arm but that is not happening now since he is on Vimpat but also on a higher Depakote dose. Depakote in the past made him sleepy at the higher dose.   He stays over at night with his mom whose home is 750 feet away from his.     Medications:  Current Facility-Administered Medications   Medication Dose Route Frequency Provider Last Rate Last Dose   • allopurinol (ZYLOPRIM) tablet 300 mg  300 mg Oral Daily Delio Crowe MD   300 mg at 06/05/19 0911   • ALPRAZolam (XANAX) tablet 0.25 mg  0.25 mg Oral BID PRN Delio Crowe MD   0.25 mg at 06/04/19 0952   • aspirin chewable tablet 81 mg  81 mg Oral Daily Delio Crowe MD   81 mg at 06/05/19 0912   • carvedilol (COREG) tablet 25 mg  25 mg Oral BID With Meals Delio Crowe MD   25 mg at 06/05/19 0912   • CloNIDine (CATAPRES) tablet 0.1 mg  0.1 mg Oral Nightly Delio Crowe MD   0.1 mg at 06/03/19 2238   • colchicine tablet 0.6 mg  0.6 mg Oral Daily Delio Crowe MD       • cyclobenzaprine (FLEXERIL) tablet 10 mg  10 mg Oral TID PRN Delio Crowe MD       • dexamethasone (DECADRON) injection 4 mg  4 mg Intravenous Q6H Baljinder Zamarripa MD   4 mg at 06/05/19 1401   • divalproex (DEPAKOTE) DR tablet 1,000 mg  1,000 mg Oral BID Delio Crowe MD   1,000 mg at 06/05/19 0911   • furosemide (LASIX) tablet 20 mg  20 mg Oral  Daily PRN Delio Crowe MD       • heparin (porcine) 5000 UNIT/ML injection 5,000 Units  5,000 Units Subcutaneous Q12H Delio Crowe MD   5,000 Units at 06/05/19 0913   • lacosamide (VIMPAT) tablet 100 mg  100 mg Oral Q12H Doreen Zuniga MD   100 mg at 06/05/19 0911   • levETIRAcetam (KEPPRA) tablet 1,500 mg  1,500 mg Oral Q12H Delio Crowe MD   1,500 mg at 06/05/19 0911   • losartan (COZAAR) tablet 100 mg  100 mg Oral Daily Delio Crowe MD   100 mg at 06/05/19 0911   • oxyCODONE-acetaminophen (PERCOCET)  MG per tablet 1 tablet  1 tablet Oral Q4H PRN Delio Crowe MD       • pantoprazole (PROTONIX) EC tablet 40 mg  40 mg Oral Daily Delio Crowe MD   40 mg at 06/05/19 0911   • QUEtiapine (SEROquel) tablet 100 mg  100 mg Oral Nightly Delio Crowe MD   100 mg at 06/04/19 2140   • sodium chloride 0.9 % flush 3 mL  3 mL Intravenous Q12H Delio Crowe MD   3 mL at 06/05/19 0913   • sodium chloride 0.9 % flush 3-10 mL  3-10 mL Intravenous PRN Delio Crowe MD       • spironolactone (ALDACTONE) tablet 25 mg  25 mg Oral Daily Delio Crowe MD   25 mg at 06/05/19 0911   • venlafaxine XR (EFFEXOR-XR) 24 hr capsule 150 mg  150 mg Oral Daily Delio Crowe MD   150 mg at 06/05/19 0911   • vitamin B-6 (PYRIDOXINE) tablet 100 mg  100 mg Oral Daily Delio Crowe MD   100 mg at 06/05/19 0911       Review of Systems:   -A 14 point review of systems is completed and is negative except for neck twitching a few times    Objective     Objective      Vital Signs  Temp:  [97.8 °F (36.6 °C)-98.6 °F (37 °C)] 98.4 °F (36.9 °C)  Heart Rate:  [70-78] 75  Resp:  [16-20] 20  BP: (107-134)/(61-81) 122/80    Physical Exam:    HEENT:  Neck supple  CVS:  Regular rate and rhythm.  No murmurs  Carotid Examination:  No bruits  Lungs:  Clear to auscultation  Abdomen:  Non-tender, Non-distended  Extremities:  No signs  of peripheral edema    Neurologic Exam:    -Awake, Alert, Oriented X 3  -No word finding difficulties  -No aphasia  -No dysarthria  -Follows simple and complex commands    Cranial nerves II through XII intact.EOMI No facial weakness  Tremor of left anterior neck muscles. Lasting under 30 seconds    Motor: (strength out of 5:  1= minimal movement, 2 = movement in plane of gravity, 3 = movement against gravity, 4 = movement against some resistance, 5 = full strength)    -Right Upper Ext: Proximal: 5 Distal: 5  -Left Upper Ext: Proximal: 5 Distal: 4+--improved    -Right Lower Ext: Proximal: 5 Distal:5  -Left Lower Ext: Proximal: 5 Distal: 5    DTR:  2+ throughout in all four extremities    Sensory:  -Intact to light touch, pinprick    Coordination/Gait:  -No ataxia     Results Review:    I reviewed the patient's new clinical results.    Lab Results (last 24 hours)     Procedure Component Value Units Date/Time    Valproic Acid Level, Total [[id number]]  (Normal) Collected:  06/05/19 0605    Specimen:  Blood Updated:  06/05/19 0649     Valproic Acid 72.4 mcg/mL         Imaging Results (last 24 hours)     ** No results found for the last 24 hours. **          Assessment/Plan     Hospital Problem List      Benign neoplasm of brain (CMS/HCC)    Impression:  1. 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 Grade 3 anaplastic astrocytoma  3. H/O untreated sleep apnea ---may lower seizure threshold. and may contribute to him feeling more sleepy during the day as he is right now.    4. Valproate levels not at a steady state as he was increased back up to 1000 mg BID since this admission. Should obtain a level in 4 days and again in 1 month. Goal is to get this > 80 and < 100 if possible.      Ref. Range 6/3/2019 18:43 6/4/2019 06:17 6/5/2019 06:05   Valproic Acid 50.0 - 100.0 mcg/mL 73.7 83.3 72.4     Seizure meds contributing to sleepiness as well.     Plan:  · Continue current seizure meds  Not at steady state yet  · Will need  close monitoring of valproate levels.   · He's agreed to current regimen and possible will be able to reduce once radiation and chemo started.   · Follow up with Neurology  · Follow up with Desi Gutierrez for sleep evaluation and treatment   · Can increase Vimpat as outpatient--this current dose may work--he's only had this twice so will need to monitor  · Will need outpatient follow up of his Depakote levels.   · He has appointment with Galen Todd PA-C in Neurology already scheduled for 7/10. He will need to follow [facility]  levels though before then.       Doreen Parker MD  06/05/19  4:48 PM

## 2019-06-05 NOTE — PLAN OF CARE
Problem: Patient Care Overview  Goal: Plan of Care Review  Outcome: Ongoing (interventions implemented as appropriate)   06/05/19 0601   Coping/Psychosocial   Plan of Care Reviewed With patient   Plan of Care Review   Progress no change   OTHER   Outcome Summary pt alert and oriented x4, left  remains weaker than right, up without assistance, safety maintained, will continue to monitor.       Problem: Fall Risk (Adult)  Goal: Absence of Fall  Outcome: Ongoing (interventions implemented as appropriate)      Problem: Pain, Chronic (Adult)  Goal: Acceptable Pain/Comfort Level and Functional Ability  Outcome: Ongoing (interventions implemented as appropriate)

## 2019-06-05 NOTE — PROGRESS NOTES
"Lukasz Savage  54 y.o.      Chief complaint:   No complaints    Subjective  No events    Temp:  [97.6 °F (36.4 °C)-98.6 °F (37 °C)] 98 °F (36.7 °C)  Heart Rate:  [75-78] 78  Resp:  [16-20] 16  BP: (111-146)/(72-84) 127/72      Objective:  General Appearance:  Comfortable, well-appearing, in no acute distress and not in pain.    Vital signs: (most recent): Blood pressure 127/72, pulse 78, temperature 98 °F (36.7 °C), temperature source Oral, resp. rate 16, height 172.7 cm (68\"), weight 83.9 kg (185 lb), SpO2 98 %.  Vital signs are normal.  No fever.    Output: Producing urine.    HEENT: Normal HEENT exam.    Lungs:  Normal effort and normal respiratory rate.    Heart: Normal rate.  Regular rhythm.    Chest: Symmetric chest wall expansion.   Extremities: Normal range of motion.    Skin:  Warm and dry.    Abdomen: Abdomen is soft and non-distended.  Bowel sounds are normal.   There is no abdominal tenderness.     Pulses: Distal pulses are intact.        Neurologic Exam     Mental Status   Oriented to person, place, and time.   Attention: normal. Concentration: normal.   Speech: speech is normal   Level of consciousness: alert    Cranial Nerves   Cranial nerves II through XII intact.     Motor Exam   Muscle bulk: normal    Strength   Strength 5/5 throughout.     Sensory Exam   Light touch normal.     Gait, Coordination, and Reflexes     Gait  Gait: normal        Benign neoplasm of brain (CMS/HCC)      Lab Results (last 24 hours)     Procedure Component Value Units Date/Time    Valproic Acid Level, Total [[id number]]  (Normal) Collected:  06/05/19 0605    Specimen:  Blood Updated:  06/05/19 0649     Valproic Acid 72.4 mcg/mL     Urine Drug Screen - Urine, Clean Catch [[id number]]  (Normal) Collected:  06/04/19 0725    Specimen:  Urine, Clean Catch Updated:  06/04/19 0752     Amphetamine Screen, Urine Negative     Barbiturates Screen, Urine Negative     Benzodiazepine Screen, Urine Negative     Cocaine Screen, Urine " Negative     Methadone Screen, Urine Negative     Opiate Screen Negative     Phencyclidine (PCP), Urine Negative     THC, Screen, Urine Negative    Narrative:       Negative Thresholds For Drugs Screened in Urine:    Amphetamines          500 ng/ml  Barbiturates          200 ng/ml  Benzodiazepines       200 ng/ml  Cocaine               150 ng/ml  Methadone             150 ng/ml  Opiates               300 ng/ml  Phencyclidine         25 ng/ml  THC                      50 ng/ml    The normal value for all drugs tested is negative. This report includes final unconfirmed screening results.  A positive result by this assay can be, at your request, sent to the Reference Lab for confirmation by gas chromatography. Unconfirmed results must not be used for non-medical purposes, such as employment or legal testing. Clinical consideration should be applied to any drug of abuse test result, particularly when unconfirmed results are used.              Plan:   No seizures overnight.  Patient is working with neurologist regarding adjusting of the medications.  From a neurosurgical standpoint the patient can be discharged whenever neurologist is satisfied with medication adjustments and treatments.  Patient hopefully to start chemo and radiation next week.    EPI Morales

## 2019-06-05 NOTE — PROGRESS NOTES
Neurology Progress Note      Date of admission: 6/3/2019  5:58 PM  Date of visit: 6/4/2019    Chief Complaint:  F/u seizure    Subjective     Subjective:    He has not had any more seizures except some left facial twitching. His left arm is a bit stronger but he has received increased steroids. EEG showed slowing but there were no epileptiform discharges. He is tolerating Vimpat    He is to start on chemo next week -or when it arrives-and radiation treatments once the chemo comes in.  Medications:  Current Facility-Administered Medications   Medication Dose Route Frequency Provider Last Rate Last Dose   • [START ON 6/5/2019] allopurinol (ZYLOPRIM) tablet 300 mg  300 mg Oral Daily Delio Crowe MD       • ALPRAZolam (XANAX) tablet 0.25 mg  0.25 mg Oral BID PRN Delio Crowe MD   0.25 mg at 06/04/19 0952   • aspirin chewable tablet 81 mg  81 mg Oral Daily Delio Crowe MD   81 mg at 06/04/19 0950   • carvedilol (COREG) tablet 25 mg  25 mg Oral BID With Meals Delio Crowe MD   25 mg at 06/04/19 1826   • CloNIDine (CATAPRES) tablet 0.1 mg  0.1 mg Oral Nightly Delio Crowe MD   0.1 mg at 06/03/19 2238   • colchicine tablet 0.6 mg  0.6 mg Oral Daily Delio Crowe MD       • cyclobenzaprine (FLEXERIL) tablet 10 mg  10 mg Oral TID PRN Delio Crowe MD       • dexamethasone (DECADRON) injection 4 mg  4 mg Intravenous Q6H Baljinder Zamarripa MD   4 mg at 06/04/19 1830   • divalproex (DEPAKOTE) DR tablet 1,000 mg  1,000 mg Oral BID Delio Crowe MD   1,000 mg at 06/04/19 0951   • furosemide (LASIX) tablet 20 mg  20 mg Oral Daily PRN Delio Crowe MD       • heparin (porcine) 5000 UNIT/ML injection 5,000 Units  5,000 Units Subcutaneous Q12H Delio Crowe MD   5,000 Units at 06/04/19 0952   • lacosamide (VIMPAT) 50 mg in sodium chloride 0.9 % 50 mL IVPB  50 mg Intravenous Q12H Doreen Zuniga  mL/hr at 06/04/19 1038  50 mg at 06/04/19 1038   • levETIRAcetam (KEPPRA) tablet 1,500 mg  1,500 mg Oral Q12H Delio Crowe MD   1,500 mg at 06/04/19 0951   • losartan (COZAAR) tablet 100 mg  100 mg Oral Daily Delio Crowe MD   100 mg at 06/04/19 0950   • oxyCODONE-acetaminophen (PERCOCET)  MG per tablet 1 tablet  1 tablet Oral Q4H PRN Delio Crowe MD       • pantoprazole (PROTONIX) EC tablet 40 mg  40 mg Oral Daily Delio Crowe MD   40 mg at 06/04/19 0951   • QUEtiapine (SEROquel) tablet 100 mg  100 mg Oral Nightly Delio Crowe MD   100 mg at 06/03/19 2238   • sodium chloride 0.9 % flush 3 mL  3 mL Intravenous Q12H Delio Crowe MD   3 mL at 06/04/19 0952   • sodium chloride 0.9 % flush 3-10 mL  3-10 mL Intravenous PRN Delio Crowe MD       • spironolactone (ALDACTONE) tablet 25 mg  25 mg Oral Daily Delio Crowe MD   25 mg at 06/04/19 0952   • venlafaxine XR (EFFEXOR-XR) 24 hr capsule 150 mg  150 mg Oral Daily Delio Crowe MD   150 mg at 06/04/19 0952   • vitamin B-6 (PYRIDOXINE) tablet 100 mg  100 mg Oral Daily Delio Crowe MD   100 mg at 06/04/19 0951       Review of Systems:   -A 14 point review of systems is completed and is negative except for 2 episodes of left face twitching today    Objective     Objective      Vital Signs  Temp:  [97.6 °F (36.4 °C)-98.2 °F (36.8 °C)] 97.6 °F (36.4 °C)  Heart Rate:  [70-87] 76  Resp:  [18-20] 20  BP: (111-146)/(71-84) 111/78    Physical Exam:    HEENT:  Neck supple  CVS:  Regular rate and rhythm.  No murmurs  Carotid Examination:  No bruits  Lungs:  Clear to auscultation  Abdomen:  Non-tender, Non-distended  Extremities:  No signs of peripheral edema    Neurologic Exam:    -Awake, Alert, Oriented X 3  -No word finding difficulties  -No aphasia  -No dysarthria  -Follows simple and complex commands    Cranial nerves II through XII intact. EOMI No facial sensory asymmetry.  Subtle  decreased in left smile  Hearing intact bilaterally. Tongue protrudes midline    Motor: (strength out of 5:  1= minimal movement, 2 = movement in plane of gravity, 3 = movement against gravity, 4 = movement against some resistance, 5 = full strength)    -Right Upper Ext: Proximal: 5 Distal: 5  -Left Upper Ext: Proximal: 5 Distal: 4+--hand  is stronger than yesterday Wrist dorsiflexion 5/5 and interossei 4+/5    -Right Lower Ext: Proximal: 5 Distal: 5  -Left Lower Ext: Proximal: 5 Distal: 5    DTR:  2+ throughout in all four extremities    Sensory:  -Intact to light touch, pinprick, temperature, pain, and proprioception    Coordination/Gait:  -No ataxia     Results Review:    I reviewed the patient's new clinical results.    Lab Results (last 24 hours)     Procedure Component Value Units Date/Time    Urine Drug Screen - Urine, Clean Catch [[id number]]  (Normal) Collected:  06/04/19 0725    Specimen:  Urine, Clean Catch Updated:  06/04/19 0752     Amphetamine Screen, Urine Negative     Barbiturates Screen, Urine Negative     Benzodiazepine Screen, Urine Negative     Cocaine Screen, Urine Negative     Methadone Screen, Urine Negative     Opiate Screen Negative     Phencyclidine (PCP), Urine Negative     THC, Screen, Urine Negative    Narrative:       Negative Thresholds For Drugs Screened in Urine:    Amphetamines          500 ng/ml  Barbiturates          200 ng/ml  Benzodiazepines       200 ng/ml  Cocaine               150 ng/ml  Methadone             150 ng/ml  Opiates               300 ng/ml  Phencyclidine         25 ng/ml  THC                      50 ng/ml    The normal value for all drugs tested is negative. This report includes final unconfirmed screening results.  A positive result by this assay can be, at your request, sent to the Reference Lab for confirmation by gas chromatography. Unconfirmed results must not be used for non-medical purposes, such as employment or legal testing. Clinical consideration  should be applied to any drug of abuse test result, particularly when unconfirmed results are used.    Valproic Acid Level, Total [[id number]]  (Normal) Collected:  06/04/19 0617    Specimen:  Blood Updated:  06/04/19 0642     Valproic Acid 83.3 mcg/mL     Basic Metabolic Panel [[id number]]  (Abnormal) Collected:  06/03/19 2250    Specimen:  Blood Updated:  06/03/19 2308     Glucose 103 mg/dL      BUN 35 mg/dL      Creatinine 1.22 mg/dL      Sodium 143 mmol/L      Potassium 4.2 mmol/L      Chloride 114 mmol/L      CO2 19.0 mmol/L      Calcium 9.9 mg/dL      eGFR Non African Amer 62 mL/min/1.73      BUN/Creatinine Ratio 28.7     Anion Gap 10.0 mmol/L     Narrative:       GFR Normal >60  Chronic Kidney Disease <60  Kidney Failure <15    CBC Auto Differential [[id number]]  (Abnormal) Collected:  06/03/19 2250    Specimen:  Blood Updated:  06/03/19 2258     WBC 11.05 10*3/mm3      RBC 5.00 10*6/mm3      Hemoglobin 14.5 g/dL      Hematocrit 41.8 %      MCV 83.6 fL      MCH 29.0 pg      MCHC 34.7 g/dL      RDW 15.1 %      RDW-SD 46.0 fl      MPV 9.3 fL      Platelets 418 10*3/mm3      Neutrophil % 80.9 %      Lymphocyte % 16.3 %      Monocyte % 1.8 %      Eosinophil % 0.2 %      Basophil % 0.3 %      Immature Grans % 0.5 %      Neutrophils, Absolute 8.95 10*3/mm3      Lymphocytes, Absolute 1.80 10*3/mm3      Monocytes, Absolute 0.20 10*3/mm3      Eosinophils, Absolute 0.02 10*3/mm3      Basophils, Absolute 0.03 10*3/mm3      Immature Grans, Absolute 0.05 10*3/mm3      nRBC 0.0 /100 WBC     Levetiracetam Level (Keppra) [[id number]] Collected:  06/03/19 2250    Specimen:  Blood Updated:  06/03/19 2255    Ethanol [[id number]]  (Normal) Collected:  06/03/19 1843    Specimen:  Blood Updated:  06/03/19 2243     Ethanol % <0.010 %     Narrative:       Not for legal purposes. Chain of Custody not followed.         Imaging Results (last 24 hours)     Procedure Component Value Units Date/Time    MRI Brain With & Without Contrast  [[id number]] Collected:  06/04/19 0755     Updated:  06/04/19 0815    Narrative:       EXAM: MRI BRAIN W WO CONTRAST- - 6/3/2019 9:49 PM CDT     HISTORY: new L arm weakness; D33.2-Benign neoplasm of brain,  unspecified; R29.898-Other symptoms and signs involving the  musculoskeletal system. 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     COMPARISON: 06/03/2019.      TECHNIQUE: Routine protocol MRI performed of the brain without and with  intravenous contrast.     FINDINGS:  There is a masslike appearance at the right frontal lobe, parafalcine  location measuring about 2.9 x 1.4 cm, previously 1.7 x 1.1 cm, with  mass effect and possible extension into the corpus callosum. The  aforementioned mass does efface the adjacent sulci, but there is no  significant midline shift. The second previously described larger area  has indistinct borders and is difficult to measure, appearing similar to  slightly increased in size and involves the right motor cortex. There is  associated faint enhancement and mild restricted diffusion. The mass  lesions do not appear to cross midline. There is some questionable  precontrast T1 hyperintensity such as axial series serial one image 1  22-99. This could represent hemorrhage/necrosis,  postbiopsy/posttreatment changes or possibly artifact. On T2 star  series, the biopsy tract is visualized.     Ventricles, cisterns and sulci are normal in size and configuration.  Sella within normal limits. Brainstem and posterior fossa are within  normal limits.      Visualized contrasted vessels and noncontrasted flow voids are within  normal limits. Visualized portions of the orbits, paranasal sinuses and  mastoid air cells are within normal limits. Visualized overlying soft  tissues within normal limits.          Impression:       1. Increased size of right frontal lobe mass lesions compared  to  04/02/2019, which are described in more detail above.     This report was finalized on 06/04/2019 08:12 by Dr Sindy Mota MD.          Assessment/Plan     Hospital Problem List      Benign neoplasm of brain (CMS/HCC)    Impression:  1. Seizure disorder  2. Astrocytoma --larger than before. Planning chemo and radiation treatment perhaps next week.       Plan:     · Increase Vimpat to 100 mg every 12--may not tolerate this rapid of increase but we can see how he does.  · Give extra 50 mg Vimpat tonight to equal 100 mg tonight  · Begin 100 mg every 12 hours tomorrow.   · Monitor Valproate levels --they are increasing and may be increasing too fast.  Not a steady state yet and we may have to back off.   · Will need to monitor liver functions and ammonia levels over time.   · If he continues to have seizures will obtain a continuous EEG.        Doreen Parker MD  06/04/19  9:15 PM

## 2019-06-06 VITALS
OXYGEN SATURATION: 98 % | TEMPERATURE: 98.2 F | SYSTOLIC BLOOD PRESSURE: 124 MMHG | HEIGHT: 68 IN | RESPIRATION RATE: 18 BRPM | HEART RATE: 74 BPM | BODY MASS INDEX: 28.04 KG/M2 | DIASTOLIC BLOOD PRESSURE: 70 MMHG | WEIGHT: 185 LBS

## 2019-06-06 LAB — VALPROATE SERPL-MCNC: 69.4 MCG/ML (ref 50–100)

## 2019-06-06 PROCEDURE — 25010000002 DEXAMETHASONE PER 1 MG: Performed by: EMERGENCY MEDICINE

## 2019-06-06 PROCEDURE — 99024 POSTOP FOLLOW-UP VISIT: CPT | Performed by: NURSE PRACTITIONER

## 2019-06-06 PROCEDURE — 25010000002 HEPARIN (PORCINE) PER 1000 UNITS: Performed by: NEUROLOGICAL SURGERY

## 2019-06-06 PROCEDURE — 99233 SBSQ HOSP IP/OBS HIGH 50: CPT | Performed by: PSYCHIATRY & NEUROLOGY

## 2019-06-06 PROCEDURE — 80164 ASSAY DIPROPYLACETIC ACD TOT: CPT | Performed by: PSYCHIATRY & NEUROLOGY

## 2019-06-06 RX ORDER — LACOSAMIDE 100 MG/1
100 TABLET ORAL EVERY 12 HOURS SCHEDULED
Qty: 60 TABLET | Refills: 0 | Status: SHIPPED | OUTPATIENT
Start: 2019-06-06 | End: 2019-06-07 | Stop reason: SDUPTHER

## 2019-06-06 RX ORDER — LEVETIRACETAM 750 MG/1
1500 TABLET ORAL EVERY 12 HOURS SCHEDULED
Qty: 60 TABLET | Refills: 0 | Status: SHIPPED | OUTPATIENT
Start: 2019-06-06 | End: 2019-06-20 | Stop reason: HOSPADM

## 2019-06-06 RX ORDER — METHYLPREDNISOLONE 4 MG/1
TABLET ORAL
Qty: 1 EACH | Refills: 0 | Status: SHIPPED | OUTPATIENT
Start: 2019-06-06 | End: 2019-06-14

## 2019-06-06 RX ORDER — DIVALPROEX SODIUM 500 MG/1
1000 TABLET, DELAYED RELEASE ORAL 2 TIMES DAILY
Qty: 60 TABLET | Refills: 2 | Status: SHIPPED | OUTPATIENT
Start: 2019-06-06 | End: 2019-06-20 | Stop reason: HOSPADM

## 2019-06-06 RX ADMIN — Medication 100 MG: at 09:29

## 2019-06-06 RX ADMIN — LACOSAMIDE 100 MG: 50 TABLET, FILM COATED ORAL at 09:29

## 2019-06-06 RX ADMIN — ASPIRIN 81 MG: 81 TABLET, CHEWABLE ORAL at 09:29

## 2019-06-06 RX ADMIN — CARVEDILOL 25 MG: 25 TABLET, FILM COATED ORAL at 09:30

## 2019-06-06 RX ADMIN — DEXAMETHASONE SODIUM PHOSPHATE 4 MG: 4 INJECTION, SOLUTION INTRA-ARTICULAR; INTRALESIONAL; INTRAMUSCULAR; INTRAVENOUS; SOFT TISSUE at 06:56

## 2019-06-06 RX ADMIN — SPIRONOLACTONE 25 MG: 25 TABLET ORAL at 09:29

## 2019-06-06 RX ADMIN — PANTOPRAZOLE SODIUM 40 MG: 40 TABLET, DELAYED RELEASE ORAL at 09:29

## 2019-06-06 RX ADMIN — LEVETIRACETAM 1500 MG: 500 TABLET, FILM COATED ORAL at 09:29

## 2019-06-06 RX ADMIN — DEXAMETHASONE SODIUM PHOSPHATE 4 MG: 4 INJECTION, SOLUTION INTRA-ARTICULAR; INTRALESIONAL; INTRAMUSCULAR; INTRAVENOUS; SOFT TISSUE at 02:24

## 2019-06-06 RX ADMIN — ALLOPURINOL 300 MG: 300 TABLET ORAL at 09:30

## 2019-06-06 RX ADMIN — VENLAFAXINE HYDROCHLORIDE 150 MG: 75 CAPSULE, EXTENDED RELEASE ORAL at 09:29

## 2019-06-06 RX ADMIN — DIVALPROEX SODIUM 1000 MG: 500 TABLET, DELAYED RELEASE ORAL at 09:29

## 2019-06-06 RX ADMIN — LOSARTAN POTASSIUM 100 MG: 50 TABLET, FILM COATED ORAL at 09:29

## 2019-06-06 RX ADMIN — SODIUM CHLORIDE, PRESERVATIVE FREE 3 ML: 5 INJECTION INTRAVENOUS at 09:32

## 2019-06-06 NOTE — PLAN OF CARE
Problem: Patient Care Overview  Goal: Plan of Care Review  Outcome: Outcome(s) achieved Date Met: 06/06/19 06/06/19 9703   Coping/Psychosocial   Plan of Care Reviewed With patient   Plan of Care Review   Progress improving   OTHER   Outcome Summary AOx4, no s/s seizure activity. VSS. pt verb understanding of dc instructions

## 2019-06-06 NOTE — DISCHARGE SUMMARY
Date of Discharge:  6/6/2019    Discharge Diagnosis: Grade 3 astrocytoma, seizures    Presenting Problem/History of Present Illness  Benign neoplasm of brain, unspecified brain region (CMS/HCC) [D33.2]       Hospital Course  Patient is a 54 y.o. male presented with seizures.  The patient does have a known grade 3 astrocytoma.  He is currently waiting to start his chemo and radiation which hopefully should be started in the next week.  The patient was on Depakote and Keppra however he had worsening seizures.  Is brought into the emergency room because of the seizures.  He was seen and evaluated by neurology without change his medications.  The patient has not had any facial twitching in the last 24 hours.  He has complained of some twitching in his neck twice however this does appear to be under better control and he feels like he is doing better at this time.  He was also started on high-dose steroids.  It is felt that this time the patient is stable and suitable to be discharged home.  We will place the patient on a Medrol Dosepak and once this is finished he can resume his usual steroid dose.  He has been started on Vimpat 100 mg twice a day.  His Depakote has been changed to 1000 milligrams twice a day.  Keppra has been changed to 1500 mg twice a day.  The patient does have an appointment with neurology on Edyta 10 however it was recommended that he see them sooner for lab work and monitoring.  I will set up an appointment to see Galen Todd in 1 week unless neurology feels he needs to be seen sooner. he does have an appointment with Dr. Trevino in August for follow-up from starting the chemo and radiation with repeat imaging.  He also does have an appointment this month with his PCP.    Procedures Performed         Consults:   Consults     Date and Time Order Name Status Description    6/3/2019 2146 Inpatient Neurology Consult General Completed             Condition on Discharge: Stable    Vital Signs  Temp:   [97.8 °F (36.6 °C)-98.4 °F (36.9 °C)] 98.4 °F (36.9 °C)  Heart Rate:  [63-75] 63  Resp:  [16-20] 18  BP: (104-134)/(61-85) 104/72    Physical Exam:   Physical Exam   Constitutional: He is oriented to person, place, and time. He appears well-developed and well-nourished.   HENT:   Head: Normocephalic.   Eyes: Conjunctivae, EOM and lids are normal. Pupils are equal, round, and reactive to light.   Neck: Normal range of motion.   Cardiovascular: Normal rate, regular rhythm and normal heart sounds.   Pulmonary/Chest: Effort normal and breath sounds normal.   Abdominal: Normal appearance.   Musculoskeletal: Normal range of motion.   Neurological: He is alert and oriented to person, place, and time. He has normal strength and normal reflexes. He displays normal reflexes. No cranial nerve deficit or sensory deficit. Gait normal. GCS eye subscore is 4. GCS verbal subscore is 5. GCS motor subscore is 6.   Skin: Skin is warm.   Psychiatric: He has a normal mood and affect. His speech is normal and behavior is normal. Thought content normal. Cognition and memory are normal.        Neurologic Exam     Mental Status   Oriented to person, place, and time.   Attention: normal. Concentration: normal.   Speech: speech is normal   Level of consciousness: alert  Normal comprehension.     Cranial Nerves   Cranial nerves II through XII intact.     CN III, IV, VI   Pupils are equal, round, and reactive to light.  Extraocular motions are normal.     Motor Exam   Muscle bulk: normal    Strength   Strength 5/5 throughout.     Sensory Exam   Light touch normal.     Gait, Coordination, and Reflexes     Gait  Gait: normal         Discharge Disposition  Home or Self Care    Discharge Medications     Discharge Medications      New Medications      Instructions Start Date   lacosamide 100 MG tablet tablet  Commonly known as:  VIMPAT   100 mg, Oral, Every 12 Hours Scheduled      methylPREDNISolone 4 MG tablet  Commonly known as:  MEDROL (PARKER)    Take as directed on package instructions.         Changes to Medications      Instructions Start Date   ALPRAZolam 0.25 MG tablet  Commonly known as:  XANAX  What changed:  when to take this   0.25 mg, Oral, 2 Times Daily PRN      CloNIDine 0.1 MG tablet  Commonly known as:  CATAPRES  What changed:    · when to take this  · reasons to take this  · additional instructions   0.1 mg, Oral, Nightly, Check BP 4x daily. If> 180 SBP take x1.      colchicine 0.6 MG tablet  What changed:    · how much to take  · how to take this  · when to take this  · reasons to take this  · additional instructions   Take 2 tabs now, repeat 1 tab in an hour.  May repeat same steps again in 24 hours if needed.  He has chronic gout      divalproex 500 MG DR tablet  Commonly known as:  DEPAKOTE  What changed:  Another medication with the same name was added. Make sure you understand how and when to take each.   1,000 mg, Oral, 2 Times Daily      divalproex 500 MG DR tablet  Commonly known as:  DEPAKOTE  What changed:  You were already taking a medication with the same name, and this prescription was added. Make sure you understand how and when to take each.   1,000 mg, Oral, 2 Times Daily      levETIRAcetam 750 MG tablet  Commonly known as:  KEPPRA  What changed:  when to take this   1,500 mg, Oral, Every 12 Hours Scheduled         Continue These Medications      Instructions Start Date   allopurinol 300 MG tablet  Commonly known as:  ZYLOPRIM   300 mg, Oral, Daily      aspirin 81 MG tablet   81 mg, Oral, Daily      carvedilol 25 MG tablet  Commonly known as:  COREG   25 mg, Oral, 2 Times Daily With Meals      cyclobenzaprine 10 MG tablet  Commonly known as:  FLEXERIL   10 mg, Oral, 3 Times Daily PRN      dexamethasone 1 MG tablet  Commonly known as:  DECADRON   1 mg, Oral, 4 Times Daily      fenofibrate 145 MG tablet  Commonly known as:  TRICOR   145 mg, Oral, Daily      fish oil 1000 MG capsule capsule   2,000 mg, Oral, Daily With  Breakfast      furosemide 20 MG tablet  Commonly known as:  LASIX   20 mg, Oral, Daily PRN      losartan 100 MG tablet  Commonly known as:  COZAAR   100 mg, Oral, Daily      omeprazole 10 MG capsule  Commonly known as:  PRILOSEC   10 mg, Oral, Daily, For acid reflux      oxyCODONE-acetaminophen  MG per tablet  Commonly known as:  PERCOCET   1 tablet, Oral, Every 4 Hours PRN      QUEtiapine 100 MG tablet  Commonly known as:  SEROquel   100 mg, Oral, Every Night at Bedtime      spironolactone 25 MG tablet  Commonly known as:  ALDACTONE   25 mg, Oral, Daily      venlafaxine  MG 24 hr capsule  Commonly known as:  EFFEXOR-XR   150 mg, Oral, Daily      vitamin B-6 100 MG tablet  Commonly known as:  PYRIDOXINE   100 mg, Oral, Daily             Discharge Diet:   Diet Instructions     Advance Diet As Tolerated            Activity at Discharge:   Activity Instructions     Activity as Tolerated            Follow-up Appointments  Future Appointments   Date Time Provider Department Center   6/19/2019  2:15 PM Linda Hoffman, DNP, APRN [facility] None   6/21/2019 12:35 PM [facility] BILLING ONLY  [facility]   7/10/2019  1:20 PM Royal Todd PA [facility] None   8/27/2019 12:30 PM Dillon Trevino MD [facility] None     Additional Instructions for the Follow-ups that You Need to Schedule     Call MD With Problems / Concerns   As directed      Instructions: If pt has increase in back or leg pain, loss of motor strength or sensation, drainage from wound, fever, loss of consciousness, seizures, neck or arm pain, N/T, N/V, difficulty swallowing or breathing, call office or go to ER    Order Comments:  Instructions: If pt has increase in back or leg pain, loss of motor strength or sensation, drainage from wound, fever, loss of consciousness, seizures, neck or arm pain, N/T, N/V, difficulty swallowing or breathing, call office or go to ER                Test Results Pending at Discharge   Order Current  Status    Levetiracetam Level (Keppra) In process    Valproic Acid Level, Total In process           Tommie Thornton, APRN  06/06/19  7:49 AM    Time: Discharge 20 min

## 2019-06-06 NOTE — PROGRESS NOTES
Neurology Progress Note      Date of admission: 6/3/2019  5:58 PM  Date of visit: 6/6/2019    Chief Complaint:  F/u seizures    Subjective     Subjective:    No seizures overnight.    Medications:  Current Facility-Administered Medications   Medication Dose Route Frequency Provider Last Rate Last Dose   • allopurinol (ZYLOPRIM) tablet 300 mg  300 mg Oral Daily Delio Crowe MD   300 mg at 06/06/19 0930   • ALPRAZolam (XANAX) tablet 0.25 mg  0.25 mg Oral BID PRN Delio Crowe MD   0.25 mg at 06/04/19 0952   • aspirin chewable tablet 81 mg  81 mg Oral Daily Delio Crowe MD   81 mg at 06/06/19 0929   • carvedilol (COREG) tablet 25 mg  25 mg Oral BID With Meals Delio Crowe MD   25 mg at 06/06/19 0930   • CloNIDine (CATAPRES) tablet 0.1 mg  0.1 mg Oral Nightly Delio Crowe MD   0.1 mg at 06/05/19 2129   • colchicine tablet 0.6 mg  0.6 mg Oral Daily Delio Crowe MD       • cyclobenzaprine (FLEXERIL) tablet 10 mg  10 mg Oral TID PRN Delio Crowe MD       • dexamethasone (DECADRON) injection 4 mg  4 mg Intravenous Q6H Baljinder Zamarripa MD   4 mg at 06/06/19 0656   • divalproex (DEPAKOTE) DR tablet 1,000 mg  1,000 mg Oral BID Delio Crowe MD   1,000 mg at 06/06/19 0929   • furosemide (LASIX) tablet 20 mg  20 mg Oral Daily PRN Delio Crowe MD       • heparin (porcine) 5000 UNIT/ML injection 5,000 Units  5,000 Units Subcutaneous Q12H Delio Crowe MD   5,000 Units at 06/05/19 2129   • lacosamide (VIMPAT) tablet 100 mg  100 mg Oral Q12H Doreen Zuniga MD   100 mg at 06/06/19 0929   • levETIRAcetam (KEPPRA) tablet 1,500 mg  1,500 mg Oral Q12H Delio Crowe MD   1,500 mg at 06/06/19 0929   • losartan (COZAAR) tablet 100 mg  100 mg Oral Daily Delio Crowe MD   100 mg at 06/06/19 0929   • oxyCODONE-acetaminophen (PERCOCET)  MG per tablet 1 tablet  1 tablet Oral Q4H PRN Delio Crowe  MD Alan       • pantoprazole (PROTONIX) EC tablet 40 mg  40 mg Oral Daily Delio Crowe MD   40 mg at 06/06/19 0929   • QUEtiapine (SEROquel) tablet 100 mg  100 mg Oral Nightly Delio Crowe MD   100 mg at 06/05/19 2129   • sodium chloride 0.9 % flush 3 mL  3 mL Intravenous Q12H Delio Crowe MD   3 mL at 06/06/19 0932   • sodium chloride 0.9 % flush 3-10 mL  3-10 mL Intravenous PRN Delio Crowe MD       • spironolactone (ALDACTONE) tablet 25 mg  25 mg Oral Daily Delio Crowe MD   25 mg at 06/06/19 0929   • venlafaxine XR (EFFEXOR-XR) 24 hr capsule 150 mg  150 mg Oral Daily Delio Crowe MD   150 mg at 06/06/19 0929   • vitamin B-6 (PYRIDOXINE) tablet 100 mg  100 mg Oral Daily Delio Crowe MD   100 mg at 06/06/19 0929       Review of Systems:   -A 14 point review of systems is completed and is negative except for neck twitching a few times    Objective     Objective      Vital Signs  Temp:  [97.8 °F (36.6 °C)-98.4 °F (36.9 °C)] 98.2 °F (36.8 °C)  Heart Rate:  [63-75] 74  Resp:  [18-20] 18  BP: (104-124)/(70-85) 124/70    Physical Exam:    HEENT:  Neck supple  CVS:  Regular rate and rhythm.  No murmurs  Carotid Examination:  No bruits  Lungs:  Clear to auscultation  Abdomen:  Non-tender, Non-distended  Extremities:  No signs of peripheral edema    Neurologic Exam:    -Awake, Alert, Oriented X 3  -No word finding difficulties  -No aphasia  -No dysarthria  -Follows simple and complex commands    Cranial nerves II through XII intact.EOMI No facial weakness  Tremor of right anterior neck muscles. Lasting under 30 seconds    Motor: (strength out of 5:  1= minimal movement, 2 = movement in plane of gravity, 3 = movement against gravity, 4 = movement against some resistance, 5 = full strength)    -Right Upper Ext: Proximal: 5 Distal: 5  -Left Upper Ext: Proximal: 5 Distal: 4+--improved    -Right Lower Ext: Proximal: 5 Distal:5  -Left Lower Ext:  Proximal: 5 Distal: 5    DTR:  2+ throughout in all four extremities    Sensory:  -Intact to light touch, pinprick    Coordination/Gait:  -No ataxia     Results Review:    I reviewed the patient's new clinical results.    Lab Results (last 24 hours)     Procedure Component Value Units Date/Time    Valproic Acid Level, Total [[id number]]  (Normal) Collected:  06/06/19 0607    Specimen:  Blood Updated:  06/06/19 0800     Valproic Acid 69.4 mcg/mL         Imaging Results (last 24 hours)     ** No results found for the last 24 hours. **          Assessment/Plan     Hospital Problem List      Benign neoplasm of brain (CMS/HCC)    Impression:  1. 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 Grade 3 anaplastic astrocytoma  3. H/O untreated sleep apnea ---may lower seizure threshold. and may contribute to him feeling more sleepy during the day as he is right now.    4. Valproate levels not at a steady state as he was increased back up to 1000 mg BID since this admission. Should obtain a level in 4 days and again in 1 month. Goal is to get this > 80 and < 100 if possible.      Ref. Range 6/3/2019 18:43 6/4/2019 06:17 6/5/2019 06:05   Valproic Acid 50.0 - 100.0 mcg/mL 73.7 83.3 72.4   Today is 69.4  Seizure meds contributing to sleepiness as well.     Plan:  · Continue current seizure meds  Not at steady state yet  · Will need close monitoring of valproate levels.   · He's agreed to current regimen and possible will be able to reduce once radiation and chemo started.   · Follow up with Neurology  · Follow up with Desi Gutierrez for sleep evaluation and treatment   · Can increase Vimpat as outpatient--this current dose may work--he's only had this twice so will need to monitor  · Will need outpatient follow up of his Depakote levels.   · He has appointment with Galen Todd PA-C in Neurology already scheduled for 7/10. He will need to follow [facility]  levels though before then.   · OK to D/C today with seizure precautions  · Will  order follow up Valproic acid level in one week.      Harpreet Arroyo MD  06/06/19  11:25 AM

## 2019-06-06 NOTE — PAYOR COMM NOTE
"[facility]  [place],  [phone]  OR   FAX   [phone]    Lukasz Wahl Mary (54 y.o. Male)     Date of Birth Social Security Number Address Home Phone MRN    [id number]  [id number] [facility]  [place] [id number] [phone] [id number]    Oriental orthodox Marital Status          [facility] Single       Admission Date Admission Type Admitting Provider Attending Provider Department, Room/Bed    6/3/19 Emergency Delio Crowe MD  [facility] 3A, 352/1    Discharge Date Discharge Disposition Discharge Destination        6/6/2019 Home or Self Care              Attending Provider:  (none)   Allergies:  Lipitor [Atorvastatin], Lisinopril    Isolation:  None   Infection:  C.difficile (07/07/17)   Code Status:  CPR    Ht:  172.7 cm (68\")   Wt:  83.9 kg (185 lb)    Admission Cmt:  None   Principal Problem:  None                Active Insurance as of 6/3/2019     Primary Coverage     Payor Plan Insurance Group Employer/Plan Group    [facility]      Payor Plan Address Payor Plan Phone Number Payor Plan Fax Number Effective Dates    [address] [address]   7/1/2017 - None Entered    [place] [address]       Subscriber Name Subscriber Birth Date Member ID       LUKASZ WAHL MARY 1965 [id number]                 Emergency Contacts      (Rel.) Home Phone Work Phone Mobile Phone    Ivonne Wahl (Mother) [phone] -- --    Yvette Pollack (Daughter) [phone] -- [phone]               Discharge Summary      Tommie Thornton APRN at 6/6/2019  7:49 AM            Date of Discharge:  6/6/2019    Discharge Diagnosis: Grade 3 astrocytoma, seizures    Presenting Problem/History of Present Illness  Benign neoplasm of brain, unspecified brain region (CMS/HCC) [D33.2]       Hospital Course  Patient is a 54 y.o. male presented with seizures.  The patient does have a known grade 3 astrocytoma.  He is currently waiting to start his chemo " and radiation which hopefully should be started in the next week.  The patient was on Depakote and Keppra however he had worsening seizures.  Is brought into the emergency room because of the seizures.  He was seen and evaluated by neurology without change his medications.  The patient has not had any facial twitching in the last 24 hours.  He has complained of some twitching in his neck twice however this does appear to be under better control and he feels like he is doing better at this time.  He was also started on high-dose steroids.  It is felt that this time the patient is stable and suitable to be discharged home.  We will place the patient on a Medrol Dosepak and once this is finished he can resume his usual steroid dose.  He has been started on Vimpat 100 mg twice a day.  His Depakote has been changed to 1000 milligrams twice a day.  Keppra has been changed to 1500 mg twice a day.  The patient does have an appointment with neurology on Edyta 10 however it was recommended that he see them sooner for lab work and monitoring.  I will set up an appointment to see Galen Newsomees in 1 week unless neurology feels he needs to be seen sooner. he does have an appointment with Dr. Trevino in August for follow-up from starting the chemo and radiation with repeat imaging.  He also does have an appointment this month with his PCP.    Procedures Performed         Consults:   Consults     Date and Time Order Name Status Description    6/3/2019 2146 Inpatient Neurology Consult General Completed             Condition on Discharge: Stable    Vital Signs  Temp:  [97.8 °F (36.6 °C)-98.4 °F (36.9 °C)] 98.4 °F (36.9 °C)  Heart Rate:  [63-75] 63  Resp:  [16-20] 18  BP: (104-134)/(61-85) 104/72    Physical Exam:   Physical Exam   Constitutional: He is oriented to person, place, and time. He appears well-developed and well-nourished.   HENT:   Head: Normocephalic.   Eyes: Conjunctivae, EOM and lids are normal. Pupils are equal, round,  and reactive to light.   Neck: Normal range of motion.   Cardiovascular: Normal rate, regular rhythm and normal heart sounds.   Pulmonary/Chest: Effort normal and breath sounds normal.   Abdominal: Normal appearance.   Musculoskeletal: Normal range of motion.   Neurological: He is alert and oriented to person, place, and time. He has normal strength and normal reflexes. He displays normal reflexes. No cranial nerve deficit or sensory deficit. Gait normal. GCS eye subscore is 4. GCS verbal subscore is 5. GCS motor subscore is 6.   Skin: Skin is warm.   Psychiatric: He has a normal mood and affect. His speech is normal and behavior is normal. Thought content normal. Cognition and memory are normal.        Neurologic Exam     Mental Status   Oriented to person, place, and time.   Attention: normal. Concentration: normal.   Speech: speech is normal   Level of consciousness: alert  Normal comprehension.     Cranial Nerves   Cranial nerves II through XII intact.     CN III, IV, VI   Pupils are equal, round, and reactive to light.  Extraocular motions are normal.     Motor Exam   Muscle bulk: normal    Strength   Strength 5/5 throughout.     Sensory Exam   Light touch normal.     Gait, Coordination, and Reflexes     Gait  Gait: normal         Discharge Disposition  Home or Self Care    Discharge Medications     Discharge Medications      New Medications      Instructions Start Date   lacosamide 100 MG tablet tablet  Commonly known as:  VIMPAT   100 mg, Oral, Every 12 Hours Scheduled      methylPREDNISolone 4 MG tablet  Commonly known as:  MEDROL (PARKER)   Take as directed on package instructions.         Changes to Medications      Instructions Start Date   ALPRAZolam 0.25 MG tablet  Commonly known as:  XANAX  What changed:  when to take this   0.25 mg, Oral, 2 Times Daily PRN      CloNIDine 0.1 MG tablet  Commonly known as:  CATAPRES  What changed:    · when to take this  · reasons to take this  · additional instructions    0.1 mg, Oral, Nightly, Check BP 4x daily. If> 180 SBP take x1.      colchicine 0.6 MG tablet  What changed:    · how much to take  · how to take this  · when to take this  · reasons to take this  · additional instructions   Take 2 tabs now, repeat 1 tab in an hour.  May repeat same steps again in 24 hours if needed.  He has chronic gout      divalproex 500 MG DR tablet  Commonly known as:  DEPAKOTE  What changed:  Another medication with the same name was added. Make sure you understand how and when to take each.   1,000 mg, Oral, 2 Times Daily      divalproex 500 MG DR tablet  Commonly known as:  DEPAKOTE  What changed:  You were already taking a medication with the same name, and this prescription was added. Make sure you understand how and when to take each.   1,000 mg, Oral, 2 Times Daily      levETIRAcetam 750 MG tablet  Commonly known as:  KEPPRA  What changed:  when to take this   1,500 mg, Oral, Every 12 Hours Scheduled         Continue These Medications      Instructions Start Date   allopurinol 300 MG tablet  Commonly known as:  ZYLOPRIM   300 mg, Oral, Daily      aspirin 81 MG tablet   81 mg, Oral, Daily      carvedilol 25 MG tablet  Commonly known as:  COREG   25 mg, Oral, 2 Times Daily With Meals      cyclobenzaprine 10 MG tablet  Commonly known as:  FLEXERIL   10 mg, Oral, 3 Times Daily PRN      dexamethasone 1 MG tablet  Commonly known as:  DECADRON   1 mg, Oral, 4 Times Daily      fenofibrate 145 MG tablet  Commonly known as:  TRICOR   145 mg, Oral, Daily      fish oil 1000 MG capsule capsule   2,000 mg, Oral, Daily With Breakfast      furosemide 20 MG tablet  Commonly known as:  LASIX   20 mg, Oral, Daily PRN      losartan 100 MG tablet  Commonly known as:  COZAAR   100 mg, Oral, Daily      omeprazole 10 MG capsule  Commonly known as:  PRILOSEC   10 mg, Oral, Daily, For acid reflux      oxyCODONE-acetaminophen  MG per tablet  Commonly known as:  PERCOCET   1 tablet, Oral, Every 4 Hours PRN       QUEtiapine 100 MG tablet  Commonly known as:  SEROquel   100 mg, Oral, Every Night at Bedtime      spironolactone 25 MG tablet  Commonly known as:  ALDACTONE   25 mg, Oral, Daily      venlafaxine  MG 24 hr capsule  Commonly known as:  EFFEXOR-XR   150 mg, Oral, Daily      vitamin B-6 100 MG tablet  Commonly known as:  PYRIDOXINE   100 mg, Oral, Daily             Discharge Diet:   Diet Instructions     Advance Diet As Tolerated            Activity at Discharge:   Activity Instructions     Activity as Tolerated            Follow-up Appointments  Future Appointments   Date Time Provider Department Center   6/19/2019  2:15 PM Linda Hoffman, SUSHMA, APRN [facility] None   6/21/2019 12:35 PM [facility] ONC BILLING ONLY [facility]   7/10/2019  1:20 PM Royal Todd PA [facility] None   8/27/2019 12:30 PM Dillon Trevino MD [facility] None     Additional Instructions for the Follow-ups that You Need to Schedule     Call MD With Problems / Concerns   As directed      Instructions: If pt has increase in back or leg pain, loss of motor strength or sensation, drainage from wound, fever, loss of consciousness, seizures, neck or arm pain, N/T, N/V, difficulty swallowing or breathing, call office or go to ER    Order Comments:  Instructions: If pt has increase in back or leg pain, loss of motor strength or sensation, drainage from wound, fever, loss of consciousness, seizures, neck or arm pain, N/T, N/V, difficulty swallowing or breathing, call office or go to ER                Test Results Pending at Discharge   Order Current Status    Levetiracetam Level (Keppra) In process    Valproic Acid Level, Total In process           EPI Morales  06/06/19  7:49 AM    Time: Discharge 20 min            Electronically signed by Tommie Thornton APRN at 6/6/2019  7:57 AM

## 2019-06-06 NOTE — PLAN OF CARE
Problem: Patient Care Overview  Goal: Plan of Care Review  Outcome: Ongoing (interventions implemented as appropriate)   06/06/19 6653   Coping/Psychosocial   Plan of Care Reviewed With patient   Plan of Care Review   Progress no change   OTHER   Outcome Summary pt alert and oriented x4, no seizure activity reported by pt or obseved by nursing during shift. Pt c/o mild back pain, pain medication offered and refused. VSS, safety maintained,       Problem: Fall Risk (Adult)  Goal: Absence of Fall  Outcome: Ongoing (interventions implemented as appropriate)      Problem: Pain, Chronic (Adult)  Goal: Acceptable Pain/Comfort Level and Functional Ability  Outcome: Ongoing (interventions implemented as appropriate)      Problem: Seizure Disorder/Epilepsy (Adult)  Goal: Signs and Symptoms of Listed Potential Problems Will be Absent, Minimized or Managed (Seizure Disorder/Epilepsy)  Outcome: Ongoing (interventions implemented as appropriate)

## 2019-06-07 ENCOUNTER — READMISSION MANAGEMENT (OUTPATIENT)
Dept: CALL CENTER | Facility: HOSPITAL | Age: 54
End: 2019-06-07

## 2019-06-07 LAB — LEVETIRACETAM SERPL-MCNC: 30.5 UG/ML (ref 10–40)

## 2019-06-07 NOTE — OUTREACH NOTE
Prep Survey      Responses   Facility patient discharged from?  [facility]   Is patient eligible?  Yes   Discharge diagnosis  Grade 3 astrocytoma, seizures,   Does the patient have one of the following disease processes/diagnoses(primary or secondary)?  Other   Does the patient have Home health ordered?  No   Is there a DME ordered?  No   Comments regarding appointments  See AVS   Prep survey completed?  Yes          Shobha Lentz RN

## 2019-06-09 ENCOUNTER — DOCUMENTATION (OUTPATIENT)
Dept: RADIATION ONCOLOGY | Facility: HOSPITAL | Age: 54
End: 2019-06-09

## 2019-06-09 NOTE — PROGRESS NOTES
This patient's treatment planning was completed on 05/28/2019.  We will check to coordinate initiation of concomitant chemoradiation with his medical oncologist.

## 2019-06-10 ENCOUNTER — READMISSION MANAGEMENT (OUTPATIENT)
Dept: CALL CENTER | Facility: HOSPITAL | Age: 54
End: 2019-06-10

## 2019-06-10 ENCOUNTER — TRANSITIONAL CARE MANAGEMENT TELEPHONE ENCOUNTER (OUTPATIENT)
Dept: FAMILY MEDICINE CLINIC | Facility: CLINIC | Age: 54
End: 2019-06-10

## 2019-06-10 NOTE — OUTREACH NOTE
Medical Week 1 Survey      Responses   Facility patient discharged from?  [facility]   Does the patient have one of the following disease processes/diagnoses(primary or secondary)?  Other   Is there a successful TCM telephone encounter documented?  No   Week 1 attempt successful?  No   Unsuccessful attempts  Attempt 1          Lyn Saleh RN

## 2019-06-11 ENCOUNTER — READMISSION MANAGEMENT (OUTPATIENT)
Dept: CALL CENTER | Facility: HOSPITAL | Age: 54
End: 2019-06-11

## 2019-06-11 ENCOUNTER — OFFICE VISIT (OUTPATIENT)
Dept: FAMILY MEDICINE CLINIC | Facility: CLINIC | Age: 54
End: 2019-06-11

## 2019-06-11 VITALS
SYSTOLIC BLOOD PRESSURE: 125 MMHG | HEART RATE: 89 BPM | TEMPERATURE: 97.9 F | HEIGHT: 68 IN | BODY MASS INDEX: 28.82 KG/M2 | DIASTOLIC BLOOD PRESSURE: 81 MMHG | OXYGEN SATURATION: 98 % | RESPIRATION RATE: 18 BRPM | WEIGHT: 190.2 LBS

## 2019-06-11 DIAGNOSIS — N52.9 ERECTILE DYSFUNCTION, UNSPECIFIED ERECTILE DYSFUNCTION TYPE: ICD-10-CM

## 2019-06-11 DIAGNOSIS — F41.9 ANXIETY: ICD-10-CM

## 2019-06-11 DIAGNOSIS — R56.9 SEIZURES (HCC): ICD-10-CM

## 2019-06-11 DIAGNOSIS — C71.9 ASTROCYTOMA (HCC): Primary | ICD-10-CM

## 2019-06-11 DIAGNOSIS — E87.5 HYPERKALEMIA: ICD-10-CM

## 2019-06-11 PROCEDURE — 99214 OFFICE O/P EST MOD 30 MIN: CPT | Performed by: NURSE PRACTITIONER

## 2019-06-11 NOTE — OUTREACH NOTE
Medical Week 1 Survey      Responses   Facility patient discharged from?  [facility]   Does the patient have one of the following disease processes/diagnoses(primary or secondary)?  Other   Is there a successful TCM telephone encounter documented?  Yes          Antonio Vasquez RN

## 2019-06-11 NOTE — PROGRESS NOTES
Chief Complaint   Patient presents with   • Brain Tumor     Pt is here for followup for brain tumor.           Lukasz Savage is a   54 y.o. male here for follow up for brain tumor.  He says that he is going to start the chemo this week.  He is still having seizures and his last one was this am and lasting 3 minutes.  He also had a seizure last night that lasted bout 16 minutes.  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 (followed by Dr. Trevino of neurosurgery). Lukasz and his mother are here today for follow up, he is now walking with a cane,  Still having problems with walking, seizures and sleeping all the time.  Says he sleeps anywhere for 12-20 hours a day.   He does have associated fatigue, dizziness, speech difficulty, weakness, and headaches. He is going to do chemo treatments with Dr. Hernandez.   He says he is not drinking and last time he drank was 7/2017.  He says that he is really struggling with anxiety, says he lays and thinks about all of this. Denies suicidal or homicidal ideations. Complains about rib pain today rating it 2/10                                       History of Present Illness:  07/03/2017 - CT Head without contrast:  · Bilateral occipital lobe hypodensity compatible with subacute ischemic change. MRI correlation recommended.     07/03/2017 - MRI brain with and without contrast:  · Posterior reversible encephalopathy syndrome.   · Follow-up in 8 weeks is suggested.     07/08/2017 - CT head with and without contrast:  · Negative CT brain without and with contrast     08/29/2017 - MRI brain with and without contrast:  · Interval resolution of posterior cortical and subcortical FLAIR signal.  · No stroke, hemorrhage, or abnormal enhancement.  · No new or increased abnormality    Chronic problems: astrocytoma, anxiety/depression stable with alprazolam, seroquel, and venlafaxine, gout stable with allopurinol and colchincine, , HTN stable  with losartan, carvediolol, clonidine, spironolactone, hyperlipidemia stable with omega 3 fatty acids and fenofibrate, seizures not stable with divalproex and levetiracetam, edema stable with furosemide, pain stable with oxycodone, GERD stable with omeprazole    PCP:  Linda Hoffman, DNP, APRN    Allergies   Allergen Reactions   • Lipitor [Atorvastatin] Rash   • Lisinopril Angioedema     Swell tongue       Past Medical History:   Diagnosis Date   • Angio-edema 7/3/2017   • Anxiety    • Arthritis    • Cancer (CMS/HCC) 2019    Brain cancer diagnoised yesterday  Dr Trevino    • Clostridium difficile colitis    • Erectile dysfunction 10/6/2016   • GERD (gastroesophageal reflux disease)    • Gout    • HCAP (healthcare-associated pneumonia) 2017   • Hyperlipidemia    • Malignant hypertension    • MSSA (methicillin susceptible Staphylococcus aureus) infection 2017   • Obstructive sleep apnea    • Seizures (CMS/[facility]) 2017       Past Surgical History:   Procedure Laterality Date   • COLONOSCOPY     • CRANIOTOMY Right 4/15/2019    Procedure: CRANIOTOMY WITH BIOPSY RIGHT;  Surgeon: Dillon Trevino MD;  Location: [facility] OR;  Service: Neurosurgery   • SHOULDER ARTHROSCOPY Left    • TRACHEOSTOMY N/A 2017    Procedure: TRACHEOSTOMY WITH TRACHEOSCOPY;  Surgeon: Jairon Pierson MD;  Location: [facility] OR;  Service:        Social History     Socioeconomic History   • Marital status: Single     Spouse name: Not on file   • Number of children: 2   • Years of education: Not on file   • Highest education level: Not on file   Occupational History   • Occupation: ELECTRIAL WORK   Tobacco Use   • Smoking status: Former Smoker     Packs/day: 0.33     Years: 30.00     Pack years: 9.90     Types: Cigarettes     Last attempt to quit: 7/3/2017     Years since quittin.9   • Smokeless tobacco: Never Used   Substance and Sexual Activity   • Alcohol use: No     Frequency: Never     Comment: used to drink alot but now  just ocasional beer since has seizure in 2017   • Drug use: No   • Sexual activity: Defer       Family History   Problem Relation Age of Onset   • Hypertension Mother    • Diabetes Mother    • Heart disease Father    • Hypertension Father    • No Known Problems Daughter    • No Known Problems Son    • Diabetes Maternal Grandmother    • No Known Problems Maternal Grandfather    • No Known Problems Paternal Grandmother    • Heart attack Paternal Grandfather    • Kidney disease Sister        Current Outpatient Medications on File Prior to Visit   Medication Sig Dispense Refill   • allopurinol (ZYLOPRIM) 300 MG tablet Take 300 mg by mouth Daily.     • ALPRAZolam (XANAX) 0.25 MG tablet Take 1 tablet by mouth 2 (Two) Times a Day As Needed for Anxiety. (Patient taking differently: Take 0.25 mg by mouth 3 (Three) Times a Day As Needed for Anxiety.) 60 tablet 0   • aspirin 81 MG tablet Take 1 tablet by mouth Daily.     • carvedilol (COREG) 25 MG tablet Take 1 tablet by mouth 2 (Two) Times a Day With Meals. 180 tablet 1   • CloNIDine (CATAPRES) 0.1 MG tablet Take 1 tablet by mouth Every Night. Check BP 4x daily. If> 180 SBP take x1. (Patient taking differently: Take 0.1 mg by mouth 2 (Two) Times a Day As Needed for High Blood Pressure. Check BP 4x daily. If> 180 SBP take x1.) 60 tablet 2   • colchicine 0.6 MG tablet Take 2 tabs now, repeat 1 tab in an hour.  May repeat same steps again in 24 hours if needed.  He has chronic gout (Patient taking differently: Take 0.6 mg by mouth Daily As Needed. Take 2 tabs now, repeat 1 tab in an hour.  May repeat same steps again in 24 hours if needed.  He has chronic gout) 30 tablet 2   • cyclobenzaprine (FLEXERIL) 10 MG tablet Take 1 tablet by mouth 3 (Three) Times a Day As Needed for Muscle Spasms. 60 tablet 0   • dexamethasone (DECADRON) 1 MG tablet Take 1 mg by mouth 4 (Four) Times a Day.  4   • divalproex (DEPAKOTE) 500 MG DR tablet Take 2 tablets by mouth 2 (Two) Times a Day. 120  tablet 3   • divalproex (DEPAKOTE) 500 MG DR tablet Take 2 tablets by mouth 2 (Two) Times a Day. 60 tablet 2   • fenofibrate (TRICOR) 145 MG tablet Take 1 tablet by mouth Daily. 90 tablet 1   • furosemide (LASIX) 20 MG tablet Take 20 mg by mouth Daily As Needed (Swelling).     • lacosamide (VIMPAT) 100 MG tablet tablet Take 1 tablet by mouth Every 12 (Twelve) Hours. 60 tablet 0   • levETIRAcetam (KEPPRA) 750 MG tablet Take 2 tablets by mouth Every 12 (Twelve) Hours. 60 tablet 0   • losartan (COZAAR) 100 MG tablet Take 100 mg by mouth Daily.     • methylPREDNISolone (MEDROL, PARKER,) 4 MG tablet Take as directed on package instructions. 1 each 0   • Omega-3 Fatty Acids (FISH OIL) 1000 MG capsule capsule Take 2 capsules by mouth Daily With Breakfast. 180 capsule 1   • omeprazole (PRILOSEC) 10 MG capsule Take 1 capsule by mouth Daily. For acid reflux 90 capsule 1   • oxyCODONE-acetaminophen (PERCOCET)  MG per tablet Take 1 tablet by mouth Every 4 (Four) Hours As Needed for Moderate Pain . 60 tablet 0   • QUEtiapine (SEROquel) 100 MG tablet Take 1 tablet by mouth every night at bedtime. 90 tablet 0   • spironolactone (ALDACTONE) 25 MG tablet Take 25 mg by mouth Daily.     • venlafaxine XR (EFFEXOR-XR) 150 MG 24 hr capsule Take 1 capsule by mouth Daily. 90 capsule 1   • vitamin B-6 (PYRIDOXINE) 100 MG tablet Take 1 tablet by mouth Daily. 30 tablet 3     No current facility-administered medications on file prior to visit.         REVIEW OF SYMPTOMS: (Positives bolded)  General:  weight loss, fever, chills, night sweats, fatigue, appetite loss  HEENT:  blurry vision, eye pain, eye discharge, dry eyes, decreased vision,   Respiratory: shortness of breath, cough, hemoptysis, wheezing, pleurisy,   Cardiovascular:  chest pain, PND, palpitation, edema, orthopnea, syncope, swelling of extremities  Gastro: Nausea, vomiting, diarrhea, hematemesis, abdominal pain, constipation  Genito: hematuria, dysuria, glycosuria, hesitancy,  "frequency, incontinence  Musckelo: Arthralgia, myalgia, muscle weakness, joint swelling, NSAID use  Skin: rash, pruritis, sores, nail changes, skin thickening  Neuro:  Migraine, numbness, ataxia, tremor, vertigo, weakness, memory loss, anxiety, headache  \"All other systems reviewed and negative, except as listed above.”      OBJECTIVE:  Constitutional:  -No acute distress, Consistent with stated age.  Oriented x 3, alert Posture-Not doubled over. Normal pace, normal arm movement. Patient is pleasant and cooperative with the interview and exam.    Integumentary: No rashes, ulcers or lesions. No edema.  Capillary refill is normal bilateral Upper and lower extremity.     Eye: Bilaterally PERRLA, EOMI.  No discharge.  Upper and lower eyelids are normal. Sclera/conjunctiva normal without discharge. Cornea is normal and clear. Lens is normal.  Eyeball appears normal. No ciliary flushing, no conjunctival injection.    CHEST/LUNG:  Normal effort, no distress, no use of accessory muscles. Breath sounds normal throughout all lung fields.  Lungs are clear today. No wheezes, rales, rhonchi.     CARDIOVASCULAR:  RRR, no bruits or abnormal pulsations. No murmur noted in sitting, supine positions. no digital clubbing, cyanosis, edema, increased warmth.    ABDOMEN: normal, Bowel sounds in all 4 quadrants. Soft, non-tender, no rebound tenderness, no rigidity (guarding), no jar tenderness, no masses.  no hepatomegaly/splenomegaly, Hernias- none. Rectal not examined.     Peripheral Vascular: Normal temperature with pink nailbeds and no ulcerations. Pedal hair intact.  Normal capillary refill. Has 1+ lower extremity edema.    Neuropsych: normal mood and congruent affect. Able to articulate well, normal speech, normal rate, normal tone, normal use of language, volume and coherence.  no SI/HI, no hallucinations, delusions, obsessions.  Judgement- Appropriate. Memory-Recall intact, remote and recent memory intact. Knowledge- Age " appropriate fund of knowledge, concentration and attention span normal.    Lymphatic: Head/Neck- normal size and non tender to palpation. Axillary- Head and neck LN are normal size and non tender to palpation.    Lukasz was seen today for brain tumor.    Diagnoses and all orders for this visit:    Astrocytoma (CMS/HCC)        Continue follow up and treatment with Dr. Hernandez    Seizures (CMS/HCC)       Continue to follow up with neuro and take divalproex and keppra  as prescribed    Hyperkalemia       Continue  Increasing potassium in diet    Anxiety       Continue to take seroquel and alprazolam as prescribed    Erectile dysfunction, unspecified erectile dysfunction type      Continue taking meds as directed         obesity:  BMI:  27.8     Weight:  183    Does not want to discuss obesity.  Says he has bigger fish to [place].       Management plan:  Take medications as prescribed; return to the clinic of new or worsening concerns.       Risks/benefits of current and new medications discussed with the patient and or family today.  The patient/family are aware and accept that if there any side effects they should call or return to clinic as soon as possible.  Appropriate F/U discussed for topics addressed today. All questions were answered to the satisfactory state of patient/family.  Should symptoms fail to improve or worsen they agree to call or return to clinic or to go to the ER. Education handouts were offered on any new Rx if requested.  Discussed the importance of following up with any needed screening tests/labs/specialist appointments and any requested follow-up recommended by me today.  Importance of maintaining follow-up discussed and patient accepts that missed appointments can delay diagnosis and potentially lead to worsening of conditions.    Return in about 1 month (around 7/11/2019) for Recheck.    Linda Hoffman, DNP, APRN  6/11/2019

## 2019-06-12 ENCOUNTER — HOSPITAL ENCOUNTER (OUTPATIENT)
Dept: RADIATION ONCOLOGY | Facility: HOSPITAL | Age: 54
Discharge: HOME | End: 2019-06-12

## 2019-06-12 ENCOUNTER — NURSE TRIAGE (OUTPATIENT)
Dept: CALL CENTER | Facility: HOSPITAL | Age: 54
End: 2019-06-12

## 2019-06-12 DIAGNOSIS — C71.9 ASTROCYTOMA BRAIN TUMOR (HCC): ICD-10-CM

## 2019-06-12 DIAGNOSIS — R56.9 SEIZURES (HCC): ICD-10-CM

## 2019-06-12 PROCEDURE — 77280 THER RAD SIMULAJ FIELD SMPL: CPT | Performed by: RADIOLOGY

## 2019-06-12 PROCEDURE — 77412 RADIATION TX DELIVERY LVL 3: CPT | Performed by: RADIOLOGY

## 2019-06-12 RX ORDER — LACOSAMIDE 100 MG/1
100 TABLET ORAL EVERY 12 HOURS SCHEDULED
Qty: 30 TABLET | Refills: 0 | OUTPATIENT
Start: 2019-06-12 | End: 2019-06-20 | Stop reason: HOSPADM

## 2019-06-13 ENCOUNTER — READMISSION MANAGEMENT (OUTPATIENT)
Dept: CALL CENTER | Facility: HOSPITAL | Age: 54
End: 2019-06-13

## 2019-06-13 ENCOUNTER — HOSPITAL ENCOUNTER (OUTPATIENT)
Dept: RADIATION ONCOLOGY | Facility: HOSPITAL | Age: 54
Discharge: HOME | End: 2019-06-13

## 2019-06-13 PROCEDURE — 77412 RADIATION TX DELIVERY LVL 3: CPT | Performed by: RADIOLOGY

## 2019-06-13 PROCEDURE — 77417 THER RADIOLOGY PORT IMAGE(S): CPT | Performed by: RADIOLOGY

## 2019-06-13 NOTE — OUTREACH NOTE
Medical Week 2 Survey      Responses   Facility patient discharged from?  [place]   Does the patient have one of the following disease processes/diagnoses(primary or secondary)?  Other   Week 2 attempt successful?  No   Unsuccessful attempts  Attempt 1          Radha Lozada RN

## 2019-06-14 ENCOUNTER — APPOINTMENT (OUTPATIENT)
Dept: NEUROLOGY | Facility: HOSPITAL | Age: 54
End: 2019-06-14

## 2019-06-14 ENCOUNTER — HOSPITAL ENCOUNTER (INPATIENT)
Facility: HOSPITAL | Age: 54
LOS: 6 days | Discharge: HOME | End: 2019-06-20
Attending: EMERGENCY MEDICINE | Admitting: INTERNAL MEDICINE

## 2019-06-14 ENCOUNTER — DOCUMENTATION (OUTPATIENT)
Dept: RADIATION ONCOLOGY | Facility: HOSPITAL | Age: 54
End: 2019-06-14

## 2019-06-14 ENCOUNTER — APPOINTMENT (OUTPATIENT)
Dept: GENERAL RADIOLOGY | Facility: HOSPITAL | Age: 54
End: 2019-06-14

## 2019-06-14 ENCOUNTER — READMISSION MANAGEMENT (OUTPATIENT)
Dept: CALL CENTER | Facility: HOSPITAL | Age: 54
End: 2019-06-14

## 2019-06-14 ENCOUNTER — APPOINTMENT (OUTPATIENT)
Dept: CT IMAGING | Facility: HOSPITAL | Age: 54
End: 2019-06-14

## 2019-06-14 DIAGNOSIS — Z78.9 IMPAIRED MOBILITY AND ADLS: ICD-10-CM

## 2019-06-14 DIAGNOSIS — C71.9 ASTROCYTOMA BRAIN TUMOR (HCC): ICD-10-CM

## 2019-06-14 DIAGNOSIS — G40.901 STATUS EPILEPTICUS (HCC): Primary | ICD-10-CM

## 2019-06-14 DIAGNOSIS — Z74.09 IMPAIRED MOBILITY AND ADLS: ICD-10-CM

## 2019-06-14 DIAGNOSIS — R13.10 DYSPHAGIA, UNSPECIFIED TYPE: ICD-10-CM

## 2019-06-14 DIAGNOSIS — Z74.09 IMPAIRED MOBILITY: ICD-10-CM

## 2019-06-14 PROBLEM — E87.5 HYPERKALEMIA: Status: ACTIVE | Noted: 2019-06-14

## 2019-06-14 PROBLEM — N17.9 ACUTE KIDNEY INJURY (HCC): Status: ACTIVE | Noted: 2019-06-14

## 2019-06-14 LAB
A-A DO2: ABNORMAL MMHG
ALBUMIN SERPL-MCNC: 3.5 G/DL (ref 3.5–5)
ALBUMIN/GLOB SERPL: 1.3 G/DL (ref 1.1–2.5)
ALP SERPL-CCNC: 64 U/L (ref 24–120)
ALT SERPL W P-5'-P-CCNC: 35 U/L (ref 0–54)
ANION GAP SERPL CALCULATED.3IONS-SCNC: 5 MMOL/L (ref 4–13)
ANION GAP SERPL CALCULATED.3IONS-SCNC: 7 MMOL/L (ref 4–13)
ARTERIAL PATENCY WRIST A: ABNORMAL
AST SERPL-CCNC: 39 U/L (ref 7–45)
ATMOSPHERIC PRESS: 754 MMHG
BASE EXCESS BLDA CALC-SCNC: -2.9 MMOL/L (ref 0–2)
BASOPHILS # BLD AUTO: 0.02 10*3/MM3 (ref 0–0.2)
BASOPHILS NFR BLD AUTO: 0.2 % (ref 0–2)
BDY SITE: ABNORMAL
BILIRUB SERPL-MCNC: 0.3 MG/DL (ref 0.1–1)
BODY TEMPERATURE: 37 C
BUN BLD-MCNC: 26 MG/DL (ref 5–21)
BUN BLD-MCNC: 27 MG/DL (ref 5–21)
BUN/CREAT SERPL: 15.9 (ref 7–25)
BUN/CREAT SERPL: 16.9 (ref 7–25)
CALCIUM SPEC-SCNC: 8.9 MG/DL (ref 8.4–10.4)
CALCIUM SPEC-SCNC: 8.9 MG/DL (ref 8.4–10.4)
CHLORIDE SERPL-SCNC: 110 MMOL/L (ref 98–110)
CHLORIDE SERPL-SCNC: 110 MMOL/L (ref 98–110)
CK SERPL-CCNC: 139 U/L (ref 0–203)
CO2 SERPL-SCNC: 24 MMOL/L (ref 24–31)
CO2 SERPL-SCNC: 28 MMOL/L (ref 24–31)
COHGB MFR BLD: 0.5 % (ref 0–5)
CREAT BLD-MCNC: 1.54 MG/DL (ref 0.5–1.4)
CREAT BLD-MCNC: 1.7 MG/DL (ref 0.5–1.4)
D-LACTATE SERPL-SCNC: 1.7 MMOL/L (ref 0.5–2)
D-LACTATE SERPL-SCNC: 2.1 MMOL/L (ref 0.5–2)
DEPRECATED RDW RBC AUTO: 47.5 FL (ref 40–54)
EOSINOPHIL # BLD AUTO: 0.03 10*3/MM3 (ref 0–0.7)
EOSINOPHIL NFR BLD AUTO: 0.3 % (ref 0–4)
ERYTHROCYTE [DISTWIDTH] IN BLOOD BY AUTOMATED COUNT: 15.1 % (ref 12–15)
GFR SERPL CREATININE-BSD FRML MDRD: 42 ML/MIN/1.73
GFR SERPL CREATININE-BSD FRML MDRD: 47 ML/MIN/1.73
GLOBULIN UR ELPH-MCNC: 2.8 GM/DL
GLUCOSE BLD-MCNC: 113 MG/DL (ref 70–100)
GLUCOSE BLD-MCNC: 81 MG/DL (ref 70–100)
GLUCOSE BLDC GLUCOMTR-MCNC: 75 MG/DL (ref 70–130)
HCO3 BLDA-SCNC: 19.5 MMOL/L (ref 20–26)
HCT VFR BLD AUTO: 39 % (ref 40–52)
HCT VFR BLD CALC: 40.6 % (ref 38–51)
HGB BLD-MCNC: 12.9 G/DL (ref 14–18)
HGB BLDA-MCNC: 13.3 G/DL (ref 14–18)
HOLD SPECIMEN: NORMAL
HOROWITZ INDEX BLD+IHG-RTO: 30 %
IMM GRANULOCYTES # BLD AUTO: 0.15 10*3/MM3 (ref 0–0.05)
IMM GRANULOCYTES NFR BLD AUTO: 1.3 % (ref 0–5)
LYMPHOCYTES # BLD AUTO: 2.1 10*3/MM3 (ref 0.72–4.86)
LYMPHOCYTES NFR BLD AUTO: 18.7 % (ref 15–45)
Lab: ABNORMAL
MAGNESIUM SERPL-MCNC: 2.3 MG/DL (ref 1.4–2.2)
MCH RBC QN AUTO: 28.4 PG (ref 28–32)
MCHC RBC AUTO-ENTMCNC: 33.1 G/DL (ref 33–36)
MCV RBC AUTO: 85.9 FL (ref 82–95)
METHGB BLD QL: 1.1 % (ref 0–3)
MODALITY: ABNORMAL
MONOCYTES # BLD AUTO: 0.86 10*3/MM3 (ref 0.19–1.3)
MONOCYTES NFR BLD AUTO: 7.7 % (ref 4–12)
NEUTROPHILS # BLD AUTO: 8.05 10*3/MM3 (ref 1.87–8.4)
NEUTROPHILS NFR BLD AUTO: 71.8 % (ref 39–78)
NOTE: ABNORMAL
NRBC BLD AUTO-RTO: 0 /100 WBC (ref 0–0.2)
OXYHGB MFR BLDV: 97.7 % (ref 94–99)
PCO2 BLDA: 27 MM HG (ref 35–45)
PCO2 TEMP ADJ BLD: ABNORMAL MM HG (ref 35–45)
PEEP RESPIRATORY: 5 CM[H2O]
PH BLDA: 7.47 PH UNITS (ref 7.35–7.45)
PH, TEMP CORRECTED: ABNORMAL PH UNITS (ref 7.35–7.45)
PHENYTOIN SERPL-MCNC: 16.4 MCG/ML (ref 10–20)
PLATELET # BLD AUTO: 274 10*3/MM3 (ref 130–400)
PMV BLD AUTO: 10.4 FL (ref 6–12)
PO2 BLDA: 135 MM HG (ref 83–108)
PO2 TEMP ADJ BLD: ABNORMAL MM HG (ref 83–108)
POTASSIUM BLD-SCNC: 3.9 MMOL/L (ref 3.5–5.3)
POTASSIUM BLD-SCNC: 5.4 MMOL/L (ref 3.5–5.3)
POTASSIUM BLDA-SCNC: 3.8 MMOL/L (ref 3.5–5.2)
PROT SERPL-MCNC: 6.3 G/DL (ref 6.3–8.7)
RBC # BLD AUTO: 4.54 10*6/MM3 (ref 4.8–5.9)
SAO2 % BLDCOA: 99.3 % (ref 94–99)
SET MECH RESP RATE: 14
SODIUM BLD-SCNC: 141 MMOL/L (ref 135–145)
SODIUM BLD-SCNC: 143 MMOL/L (ref 135–145)
SODIUM BLDA-SCNC: 141 MMOL/L (ref 136–145)
VALPROATE SERPL-MCNC: 84.4 MCG/ML (ref 50–100)
VENTILATOR MODE: AC
VT ON VENT VENT: 600 ML
WBC NRBC COR # BLD: 11.21 10*3/MM3 (ref 4.8–10.8)
WHOLE BLOOD HOLD SPECIMEN: NORMAL
WHOLE BLOOD HOLD SPECIMEN: NORMAL

## 2019-06-14 PROCEDURE — 94002 VENT MGMT INPAT INIT DAY: CPT

## 2019-06-14 PROCEDURE — 94660 CPAP INITIATION&MGMT: CPT

## 2019-06-14 PROCEDURE — 25010000002 PROPOFOL 1000 MG/ML EMULSION: Performed by: EMERGENCY MEDICINE

## 2019-06-14 PROCEDURE — 94799 UNLISTED PULMONARY SVC/PX: CPT

## 2019-06-14 PROCEDURE — 80177 DRUG SCRN QUAN LEVETIRACETAM: CPT | Performed by: PSYCHIATRY & NEUROLOGY

## 2019-06-14 PROCEDURE — 80053 COMPREHEN METABOLIC PANEL: CPT | Performed by: EMERGENCY MEDICINE

## 2019-06-14 PROCEDURE — 74018 RADEX ABDOMEN 1 VIEW: CPT

## 2019-06-14 PROCEDURE — 25010000002 LORAZEPAM PER 2 MG: Performed by: INTERNAL MEDICINE

## 2019-06-14 PROCEDURE — 85025 COMPLETE CBC W/AUTO DIFF WBC: CPT | Performed by: EMERGENCY MEDICINE

## 2019-06-14 PROCEDURE — 25010000002 LEVETRIRACETAM PER 10 MG: Performed by: PSYCHIATRY & NEUROLOGY

## 2019-06-14 PROCEDURE — 83605 ASSAY OF LACTIC ACID: CPT | Performed by: INTERNAL MEDICINE

## 2019-06-14 PROCEDURE — 82375 ASSAY CARBOXYHB QUANT: CPT

## 2019-06-14 PROCEDURE — 25010000002 PROPOFOL 1000 MG/ML EMULSION: Performed by: INTERNAL MEDICINE

## 2019-06-14 PROCEDURE — 95816 EEG AWAKE AND DROWSY: CPT

## 2019-06-14 PROCEDURE — 83050 HGB METHEMOGLOBIN QUAN: CPT

## 2019-06-14 PROCEDURE — 5A1945Z RESPIRATORY VENTILATION, 24-96 CONSECUTIVE HOURS: ICD-10-PCS | Performed by: INTERNAL MEDICINE

## 2019-06-14 PROCEDURE — 80185 ASSAY OF PHENYTOIN TOTAL: CPT | Performed by: PSYCHIATRY & NEUROLOGY

## 2019-06-14 PROCEDURE — 72125 CT NECK SPINE W/O DYE: CPT

## 2019-06-14 PROCEDURE — 71045 X-RAY EXAM CHEST 1 VIEW: CPT

## 2019-06-14 PROCEDURE — 70450 CT HEAD/BRAIN W/O DYE: CPT

## 2019-06-14 PROCEDURE — 82962 GLUCOSE BLOOD TEST: CPT

## 2019-06-14 PROCEDURE — 25010000002 ENOXAPARIN PER 10 MG: Performed by: INTERNAL MEDICINE

## 2019-06-14 PROCEDURE — 95816 EEG AWAKE AND DROWSY: CPT | Performed by: PSYCHIATRY & NEUROLOGY

## 2019-06-14 PROCEDURE — 83735 ASSAY OF MAGNESIUM: CPT | Performed by: EMERGENCY MEDICINE

## 2019-06-14 PROCEDURE — 99291 CRITICAL CARE FIRST HOUR: CPT

## 2019-06-14 PROCEDURE — 0BH18EZ INSERTION OF ENDOTRACHEAL AIRWAY INTO TRACHEA, VIA NATURAL OR ARTIFICIAL OPENING ENDOSCOPIC: ICD-10-PCS | Performed by: EMERGENCY MEDICINE

## 2019-06-14 PROCEDURE — 99024 POSTOP FOLLOW-UP VISIT: CPT | Performed by: NURSE PRACTITIONER

## 2019-06-14 PROCEDURE — 31500 INSERT EMERGENCY AIRWAY: CPT

## 2019-06-14 PROCEDURE — 36600 WITHDRAWAL OF ARTERIAL BLOOD: CPT

## 2019-06-14 PROCEDURE — 82805 BLOOD GASES W/O2 SATURATION: CPT

## 2019-06-14 PROCEDURE — 25010000002 SUCCINYLCHOLINE PER 20 MG: Performed by: EMERGENCY MEDICINE

## 2019-06-14 PROCEDURE — 82550 ASSAY OF CK (CPK): CPT | Performed by: INTERNAL MEDICINE

## 2019-06-14 PROCEDURE — 80164 ASSAY DIPROPYLACETIC ACD TOT: CPT | Performed by: EMERGENCY MEDICINE

## 2019-06-14 PROCEDURE — 99291 CRITICAL CARE FIRST HOUR: CPT | Performed by: PSYCHIATRY & NEUROLOGY

## 2019-06-14 PROCEDURE — 25010000002 FOSPHENYTOIN 500 MG PE/10ML SOLUTION 10 ML VIAL: Performed by: EMERGENCY MEDICINE

## 2019-06-14 RX ORDER — PANTOPRAZOLE SODIUM 40 MG/10ML
40 INJECTION, POWDER, LYOPHILIZED, FOR SOLUTION INTRAVENOUS
Status: DISCONTINUED | OUTPATIENT
Start: 2019-06-14 | End: 2019-06-14

## 2019-06-14 RX ORDER — ONDANSETRON 2 MG/ML
4 INJECTION INTRAMUSCULAR; INTRAVENOUS EVERY 6 HOURS PRN
Status: DISCONTINUED | OUTPATIENT
Start: 2019-06-14 | End: 2019-06-20 | Stop reason: HOSPADM

## 2019-06-14 RX ORDER — SODIUM CHLORIDE 0.9 % (FLUSH) 0.9 %
3-10 SYRINGE (ML) INJECTION AS NEEDED
Status: DISCONTINUED | OUTPATIENT
Start: 2019-06-14 | End: 2019-06-20 | Stop reason: HOSPADM

## 2019-06-14 RX ORDER — SUCCINYLCHOLINE CHLORIDE 20 MG/ML
200 INJECTION INTRAMUSCULAR; INTRAVENOUS ONCE
Status: COMPLETED | OUTPATIENT
Start: 2019-06-14 | End: 2019-06-14

## 2019-06-14 RX ORDER — ACETAMINOPHEN 325 MG/1
650 TABLET ORAL EVERY 4 HOURS PRN
Status: DISCONTINUED | OUTPATIENT
Start: 2019-06-14 | End: 2019-06-20 | Stop reason: HOSPADM

## 2019-06-14 RX ORDER — FAMOTIDINE 10 MG/ML
20 INJECTION, SOLUTION INTRAVENOUS EVERY 12 HOURS SCHEDULED
Status: DISCONTINUED | OUTPATIENT
Start: 2019-06-14 | End: 2019-06-14

## 2019-06-14 RX ORDER — ETOMIDATE 2 MG/ML
15 INJECTION INTRAVENOUS ONCE
Status: COMPLETED | OUTPATIENT
Start: 2019-06-14 | End: 2019-06-14

## 2019-06-14 RX ORDER — SODIUM CHLORIDE 9 MG/ML
20 INJECTION, SOLUTION INTRAVENOUS CONTINUOUS
Status: DISCONTINUED | OUTPATIENT
Start: 2019-06-14 | End: 2019-06-19

## 2019-06-14 RX ORDER — VALPROIC ACID 250 MG/5ML
500 SOLUTION ORAL EVERY 6 HOURS SCHEDULED
Status: DISCONTINUED | OUTPATIENT
Start: 2019-06-14 | End: 2019-06-15

## 2019-06-14 RX ORDER — LIDOCAINE HYDROCHLORIDE 20 MG/ML
INJECTION, SOLUTION INTRAVENOUS
Status: COMPLETED
Start: 2019-06-14 | End: 2019-06-14

## 2019-06-14 RX ORDER — CHLORHEXIDINE GLUCONATE 0.12 MG/ML
15 RINSE ORAL EVERY 12 HOURS SCHEDULED
Status: DISCONTINUED | OUTPATIENT
Start: 2019-06-14 | End: 2019-06-18

## 2019-06-14 RX ORDER — LANSOPRAZOLE
30 KIT EVERY MORNING
Status: DISCONTINUED | OUTPATIENT
Start: 2019-06-15 | End: 2019-06-15 | Stop reason: SDUPTHER

## 2019-06-14 RX ORDER — SODIUM CHLORIDE 0.9 % (FLUSH) 0.9 %
3 SYRINGE (ML) INJECTION EVERY 12 HOURS SCHEDULED
Status: DISCONTINUED | OUTPATIENT
Start: 2019-06-14 | End: 2019-06-20 | Stop reason: HOSPADM

## 2019-06-14 RX ORDER — LORAZEPAM 2 MG/ML
1 INJECTION INTRAMUSCULAR
Status: DISCONTINUED | OUTPATIENT
Start: 2019-06-14 | End: 2019-06-20 | Stop reason: HOSPADM

## 2019-06-14 RX ORDER — ACETAMINOPHEN 650 MG/1
650 SUPPOSITORY RECTAL EVERY 4 HOURS PRN
Status: DISCONTINUED | OUTPATIENT
Start: 2019-06-14 | End: 2019-06-19

## 2019-06-14 RX ADMIN — PROPOFOL 5 MCG/KG/MIN: 10 INJECTION, EMULSION INTRAVENOUS at 04:04

## 2019-06-14 RX ADMIN — CHLORHEXIDINE GLUCONATE 15 ML: 1.2 RINSE ORAL at 09:50

## 2019-06-14 RX ADMIN — PROPOFOL 25 MCG/KG/MIN: 10 INJECTION, EMULSION INTRAVENOUS at 09:43

## 2019-06-14 RX ADMIN — LIDOCAINE HYDROCHLORIDE 100 MG: 20 INJECTION, SOLUTION INTRAVENOUS at 03:57

## 2019-06-14 RX ADMIN — SODIUM CHLORIDE 500 ML: 9 INJECTION, SOLUTION INTRAVENOUS at 07:46

## 2019-06-14 RX ADMIN — SUCCINYLCHOLINE CHLORIDE 200 MG: 20 INJECTION, SOLUTION INTRAMUSCULAR; INTRAVENOUS; PARENTERAL at 04:00

## 2019-06-14 RX ADMIN — VALPROIC ACID 500 MG: 250 SOLUTION ORAL at 13:23

## 2019-06-14 RX ADMIN — SODIUM CHLORIDE 100 ML/HR: 9 INJECTION, SOLUTION INTRAVENOUS at 09:44

## 2019-06-14 RX ADMIN — ENOXAPARIN SODIUM 40 MG: 40 INJECTION SUBCUTANEOUS at 09:50

## 2019-06-14 RX ADMIN — SODIUM CHLORIDE 1660 MG PE: 9 INJECTION, SOLUTION INTRAVENOUS at 04:17

## 2019-06-14 RX ADMIN — CHLORHEXIDINE GLUCONATE 15 ML: 1.2 RINSE ORAL at 20:15

## 2019-06-14 RX ADMIN — PROPOFOL 75 MCG/KG/MIN: 10 INJECTION, EMULSION INTRAVENOUS at 17:42

## 2019-06-14 RX ADMIN — VALPROIC ACID 500 MG: 250 SOLUTION ORAL at 17:42

## 2019-06-14 RX ADMIN — LEVETIRACETAM 1250 MG: 500 INJECTION, SOLUTION INTRAVENOUS at 12:46

## 2019-06-14 RX ADMIN — LORAZEPAM 1 MG: 2 INJECTION INTRAMUSCULAR; INTRAVENOUS at 18:02

## 2019-06-14 RX ADMIN — PROPOFOL 75 MCG/KG/MIN: 10 INJECTION, EMULSION INTRAVENOUS at 23:08

## 2019-06-14 RX ADMIN — PROPOFOL 75 MCG/KG/MIN: 10 INJECTION, EMULSION INTRAVENOUS at 15:04

## 2019-06-14 RX ADMIN — PANTOPRAZOLE SODIUM 40 MG: 40 INJECTION, POWDER, FOR SOLUTION INTRAVENOUS at 09:50

## 2019-06-14 RX ADMIN — LORAZEPAM 1 MG: 2 INJECTION INTRAMUSCULAR; INTRAVENOUS at 13:03

## 2019-06-14 RX ADMIN — PROPOFOL 75 MCG/KG/MIN: 10 INJECTION, EMULSION INTRAVENOUS at 20:16

## 2019-06-14 RX ADMIN — SODIUM CHLORIDE 150 MG: 9 INJECTION, SOLUTION INTRAVENOUS at 12:46

## 2019-06-14 RX ADMIN — LEVETIRACETAM 1250 MG: 500 INJECTION, SOLUTION INTRAVENOUS at 20:16

## 2019-06-14 RX ADMIN — SODIUM CHLORIDE, PRESERVATIVE FREE 3 ML: 5 INJECTION INTRAVENOUS at 09:49

## 2019-06-14 RX ADMIN — ETOMIDATE 15 MG: 2 INJECTION INTRAVENOUS at 04:00

## 2019-06-14 RX ADMIN — SODIUM CHLORIDE, PRESERVATIVE FREE 3 ML: 5 INJECTION INTRAVENOUS at 20:16

## 2019-06-14 NOTE — H&P
[facility] Medicine Services  HISTORY AND PHYSICAL    Date of Admission: 6/14/2019  Primary Care Physician: Linda Hoffman, DNP, APRN    Subjective     Chief Complaint: seizures    History of Present Illness    Mr. Savage is a 53 yo M who presents with a seizure.  Patient has a history of a known grade 3 astrocytoma.  He has been following with neurology and neurosurgery.  Patient is supposed to undergo chemotherapy and radiation therapy.  Patient was diagnosed in April 2019.      Per ER report, the patient passed out and started seizing and may have had head trauma.  When he arrived he was very agitated and confused and then had repeated seizures.  He was subsequently intubated for continued seizures and airway protection.    Patient was intubated and sedated when I arrived to being hooked up to EEG.  No family at bedside.    In the ER, it was noted that he had a creatinine of 1.7, potassium of 5.4, and a mild leukocytosis thought to be reactive.  Patient also has a mild anemia.  Patient's baseline creatinine is that of chronic kidney disease stage III and is approximately 1.2-1.3.      Review of Systems   Unable to perform ROS: Intubated      Otherwise complete ROS reviewed and negative except as mentioned in the HPI.    Past Medical History:   Past Medical History:   Diagnosis Date   • Angio-edema 7/3/2017   • Anxiety    • Arthritis    • Cancer (CMS/HCC) 05/09/2019    Brain cancer diagnoised yesterday  Dr Trevino    • Clostridium difficile colitis    • Erectile dysfunction 10/6/2016   • GERD (gastroesophageal reflux disease)    • Gout    • HCAP (healthcare-associated pneumonia) 7/11/2017   • Hyperlipidemia    • Malignant hypertension    • MSSA (methicillin susceptible Staphylococcus aureus) infection 7/31/2017   • Obstructive sleep apnea    • Seizures (CMS/HCC) 7/4/2017     Past Surgical History:  Past Surgical History:   Procedure Laterality Date   • COLONOSCOPY     •  CRANIOTOMY Right 4/15/2019    Procedure: CRANIOTOMY WITH BIOPSY RIGHT;  Surgeon: Dillon Trevino MD;  Location:  [facility] OR;  Service: Neurosurgery   • SHOULDER ARTHROSCOPY Left    • TRACHEOSTOMY N/A 7/12/2017    Procedure: TRACHEOSTOMY WITH TRACHEOSCOPY;  Surgeon: Jairon Pierson MD;  Location:  [facility] OR;  Service:      Social History:  reports that he quit smoking about 1 years ago. His smoking use included cigarettes. He has a 9.90 pack-year smoking history. He has never used smokeless tobacco. He reports that he does not drink alcohol or use drugs.    Family History: family history includes Diabetes in his maternal grandmother and mother; Heart attack in his paternal grandfather; Heart disease in his father; Hypertension in his father and mother; Kidney disease in his sister; No Known Problems in his daughter, maternal grandfather, paternal grandmother, and son.       Allergies:  Allergies   Allergen Reactions   • Lipitor [Atorvastatin] Rash   • Lisinopril Angioedema     Swell tongue     Medications:  Prior to Admission medications    Medication Sig Start Date End Date Taking? Authorizing Provider   allopurinol (ZYLOPRIM) 300 MG tablet Take 300 mg by mouth Daily.    Provider, MD Phil   ALPRAZolam (XANAX) 0.25 MG tablet Take 1 tablet by mouth 2 (Two) Times a Day As Needed for Anxiety.  Patient taking differently: Take 0.25 mg by mouth 3 (Three) Times a Day As Needed for Anxiety. 5/20/19   Linda Hoffman DNP, APRN   aspirin 81 MG tablet Take 1 tablet by mouth Daily. 2/28/19   Royal Todd PA   carvedilol (COREG) 25 MG tablet Take 1 tablet by mouth 2 (Two) Times a Day With Meals. 5/20/19   Linda Hoffman DNP, APRN   CloNIDine (CATAPRES) 0.1 MG tablet Take 1 tablet by mouth Every Night. Check BP 4x daily. If> 180 SBP take x1.  Patient taking differently: Take 0.1 mg by mouth 2 (Two) Times a Day As Needed for High Blood Pressure. Check BP 4x daily. If> 180 SBP take x1. 5/20/19   [place]alton  Linda Carballo DNP, APRN   colchicine 0.6 MG tablet Take 2 tabs now, repeat 1 tab in an hour.  May repeat same steps again in 24 hours if needed.  He has chronic gout  Patient taking differently: Take 0.6 mg by mouth Daily As Needed. Take 2 tabs now, repeat 1 tab in an hour.  May repeat same steps again in 24 hours if needed.  He has chronic gout 11/20/18   Linda Hoffman DNP, APRN   cyclobenzaprine (FLEXERIL) 10 MG tablet Take 1 tablet by mouth 3 (Three) Times a Day As Needed for Muscle Spasms. 5/17/19   Oral Jessica III, MD   dexamethasone (DECADRON) 1 MG tablet Take 1 mg by mouth 4 (Four) Times a Day. 5/1/19   Phil Constantino MD   divalproex (DEPAKOTE) 500 MG DR tablet Take 2 tablets by mouth 2 (Two) Times a Day. 5/31/19   Royal Todd PA   divalproex (DEPAKOTE) 500 MG DR tablet Take 2 tablets by mouth 2 (Two) Times a Day. 6/6/19   Tommie Thornton APRN   fenofibrate (TRICOR) 145 MG tablet Take 1 tablet by mouth Daily. 5/20/19   Linda Hoffman DNP, APRN   furosemide (LASIX) 20 MG tablet Take 20 mg by mouth Daily As Needed (Swelling).    Phil Constantino MD   lacosamide (VIMPAT) 100 MG tablet tablet Take 1 tablet by mouth Every 12 (Twelve) Hours. 6/12/19   Harpreet Arroyo MD   levETIRAcetam (KEPPRA) 750 MG tablet Take 2 tablets by mouth Every 12 (Twelve) Hours. 6/6/19   Tommie Thornton APRN   losartan (COZAAR) 100 MG tablet Take 100 mg by mouth Daily.    Phil Constantino MD   Omega-3 Fatty Acids (FISH OIL) 1000 MG capsule capsule Take 2 capsules by mouth Daily With Breakfast. 5/20/19   Linda Hoffman DNP, APRN   omeprazole (PRILOSEC) 10 MG capsule Take 1 capsule by mouth Daily. For acid reflux 5/20/19   Linda Hoffman DNP, APRN   oxyCODONE-acetaminophen (PERCOCET)  MG per tablet Take 1 tablet by mouth Every 4 (Four) Hours As Needed for Moderate Pain . 5/17/19   Oral Jessica III, MD   QUEtiapine (SEROquel) 100 MG tablet Take 1 tablet by mouth every  "night at bedtime. 2/22/19   Linda Hoffman DNP, APRN   spironolactone (ALDACTONE) 25 MG tablet Take 25 mg by mouth Daily.    Provider, MD Phil   venlafaxine XR (EFFEXOR-XR) 150 MG 24 hr capsule Take 1 capsule by mouth Daily. 5/20/19   Linda Hoffman DNP, APRDICKSON   vitamin B-6 (PYRIDOXINE) 100 MG tablet Take 1 tablet by mouth Daily. 4/3/19   Royal Todd PA   methylPREDNISolone (MEDROL, PARKER,) 4 MG tablet Take as directed on package instructions. 6/6/19 6/14/19  Tommie Thornton APRN     Objective     Vital Signs: /94 (BP Location: Left arm, Patient Position: Lying)   Pulse 73   Temp 97.6 °F (36.4 °C) (Rectal)   Resp 23   Ht 172.7 cm (68\")   Wt 83 kg (183 lb)   SpO2 99%   BMI 27.83 kg/m²   Physical Exam   Constitutional: No distress. He is sedated and intubated.   HENT:   Head: Normocephalic and atraumatic.   Eyes: Conjunctivae are normal. No scleral icterus.   Neck: Neck supple. No JVD present.   Cardiovascular: Normal rate and regular rhythm. Exam reveals no gallop and no friction rub.   No murmur heard.  Pulmonary/Chest: Effort normal and breath sounds normal. No stridor. He is intubated. No respiratory distress. He has no wheezes. He has no rales.   Mild vent dyssynchrony   Abdominal: Soft. Bowel sounds are normal. He exhibits no distension and no mass. There is no tenderness. There is no guarding.   Musculoskeletal: He exhibits no edema.   Neurological:   Intubated and sedated    Skin: Skin is warm and dry. He is not diaphoretic. No erythema.   Nursing note and vitals reviewed.          Results Reviewed:  Lab Results (last 24 hours)     Procedure Component Value Units Date/Time    Blood Gas, Arterial With Co-Ox [[id number]]  (Abnormal) Collected:  06/14/19 0539    Specimen:  Arterial Blood Updated:  06/14/19 0540     Site Right Radial     Gera's Test N/A     pH, Arterial 7.467 pH units      Comment: 83 Value above reference range        pCO2, Arterial 27.0 mm Hg      " Comment: 84 Value below reference range        pO2, Arterial 135.0 mm Hg      Comment: 83 Value above reference range        HCO3, Arterial 19.5 mmol/L      Comment: 84 Value below reference range        Base Excess, Arterial -2.9 mmol/L      Comment: 84 Value below reference range        O2 Saturation, Arterial 99.3 %      Comment: 83 Value above reference range        Hemoglobin, Blood Gas 13.3 g/dL      Comment: 84 Value below reference range        Hematocrit, Blood Gas 40.6 %      Oxyhemoglobin 97.7 %      Methemoglobin 1.10 %      Carboxyhemoglobin 0.5 %      A-a Gradiant -- mmHg      Comment: UNABLE TO CALCULATE        Temperature 37.0 C      Sodium, Arterial 141 mmol/L      Potassium, Arterial 3.8 mmol/L      Barometric Pressure for Blood Gas 754 mmHg      Modality Ventilator     FIO2 30 %      Ventilator Mode AC     Set Tidal Volume 600     Set Mech Resp Rate 14.0     PEEP 5.0     Note --     Collected by [id number]     Comment: Meter: [id number]     :  [id number]        pH, Temp Corrected -- pH Units      pCO2, Temperature Corrected -- mm Hg      pO2, Temperature Corrected -- mm Hg     [place] Draw [[id number]] Collected:  06/14/19 0349    Specimen:  Blood Updated:  06/14/19 0500    Narrative:       The following orders were created for panel order [place] Draw.  Procedure                               Abnormality         Status                     ---------                               -----------         ------                     Light Blue Top[[id number]]                                   Final result               Green Top (Gel)[[id number]]                                  Final result               Lavender Top[[id number]]                                     Final result               Red Top[[id number]]                                          Final result                 Please view results for these tests on the individual orders.    Light Blue Top [[id number]] Collected:  06/14/19 0349     Specimen:  Blood Updated:  06/14/19 0500     Extra Tube hold for add-on     Comment: Auto resulted       Green Top (Gel) [[id number]] Collected:  06/14/19 0349    Specimen:  Blood Updated:  06/14/19 0500     Extra Tube Hold for add-ons.     Comment: Auto resulted.       Lavender Top [[id number]] Collected:  06/14/19 0349    Specimen:  Blood Updated:  06/14/19 0500     Extra Tube hold for add-on     Comment: Auto resulted       Red Top [[id number]] Collected:  06/14/19 0349    Specimen:  Blood Updated:  06/14/19 0500     Extra Tube Hold for add-ons.     Comment: Auto resulted.       CBC & Differential [[id number]] Collected:  06/14/19 0349    Specimen:  Blood Updated:  06/14/19 0420    Narrative:       The following orders were created for panel order CBC & Differential.  Procedure                               Abnormality         Status                     ---------                               -----------         ------                     CBC Auto Differential[[id number]]        Abnormal            Final result                 Please view results for these tests on the individual orders.    CBC Auto Differential [[id number]]  (Abnormal) Collected:  06/14/19 0349    Specimen:  Blood Updated:  06/14/19 0420     WBC 11.21 10*3/mm3      RBC 4.54 10*6/mm3      Hemoglobin 12.9 g/dL      Hematocrit 39.0 %      MCV 85.9 fL      MCH 28.4 pg      MCHC 33.1 g/dL      RDW 15.1 %      RDW-SD 47.5 fl      MPV 10.4 fL      Platelets 274 10*3/mm3      Neutrophil % 71.8 %      Lymphocyte % 18.7 %      Monocyte % 7.7 %      Eosinophil % 0.3 %      Basophil % 0.2 %      Immature Grans % 1.3 %      Neutrophils, Absolute 8.05 10*3/mm3      Lymphocytes, Absolute 2.10 10*3/mm3      Monocytes, Absolute 0.86 10*3/mm3      Eosinophils, Absolute 0.03 10*3/mm3      Basophils, Absolute 0.02 10*3/mm3      Immature Grans, Absolute 0.15 10*3/mm3      nRBC 0.0 /100 WBC     Magnesium [[id number]]  (Abnormal) Collected:  06/14/19 0349    Specimen:  Blood  Updated:  06/14/19 0419     Magnesium 2.3 mg/dL     Comprehensive Metabolic Panel [[id number]]  (Abnormal) Collected:  06/14/19 0349    Specimen:  Blood Updated:  06/14/19 0419     Glucose 113 mg/dL      BUN 27 mg/dL      Creatinine 1.70 mg/dL      Sodium 143 mmol/L      Potassium 5.4 mmol/L      Chloride 110 mmol/L      CO2 28.0 mmol/L      Calcium 8.9 mg/dL      Total Protein 6.3 g/dL      Albumin 3.50 g/dL      ALT (SGPT) 35 U/L      AST (SGOT) 39 U/L      Alkaline Phosphatase 64 U/L      Total Bilirubin 0.3 mg/dL      eGFR Non African Amer 42 mL/min/1.73      Globulin 2.8 gm/dL      A/G Ratio 1.3 g/dL      BUN/Creatinine Ratio 15.9     Anion Gap 5.0 mmol/L     Narrative:       GFR Normal >60  Chronic Kidney Disease <60  Kidney Failure <15    Valproic Acid Level, Total [[id number]]  (Normal) Collected:  06/14/19 0349    Specimen:  Blood Updated:  06/14/19 0417     Valproic Acid 84.4 mcg/mL         Imaging Results (last 24 hours)     Procedure Component Value Units Date/Time    XR Chest 1 View [[id number]] Collected:  06/14/19 0705     Updated:  06/14/19 0709    Narrative:       EXAM: XR CHEST 1 VW- - 6/14/2019 4:15 AM CDT     HISTORY: post intubation       COMPARISON: 07/15/2017.      TECHNIQUE:  1 images.  Frontal view of the chest.     FINDINGS:    Endotracheal tube tip projects 1.5 cm from the jolly. No pneumothorax  or large pleural effusion. No dense focal consolidation. Prominent  cardiac silhouette. Upper mediastinum within normal limits. No acute or  suspicious bony findings. Probable old right clavicle fracture.          Impression:       1. Endotracheal tube tip projects 1.5 cm above the jolly.  2. No focal consolidation.  This report was finalized on 06/14/2019 07:06 by Dr Sindy Mota MD.    CT Head Without Contrast [[id number]] Updated:  06/14/19 0502    CT Cervical Spine Without Contrast [[id number]] Updated:  06/14/19 0502        I have personally reviewed and interpreted the radiology studies and  ECG obtained at time of admission.     Assessment / Plan     Assessment:   Active Hospital Problems    Diagnosis   • Status epilepticus (CMS/HCC)   • Hyperkalemia   • Acute kidney injury (CMS/HCC) on chronic kidney disease stage 3   • Astrocytoma brain tumor (CMS/HCC)   • Seizures (CMS/HCC)       Plan:   1.  Admit to ICU  2.  Neurology and neurosurgery consult  3.  IVF with normal saline, bolus then 100 mL/hr  4.  Monitor serial creatinine and potassium   5.  AEDs per neurology  6.  Decreased TV on ventilator to 8mL/kg  7.  Daily ABG and CXR while intubated   8.  Recheck BMP this AM for Cr and Potassium  9.  Check CK   10.  EEG per neurology   11.  Insert OG   12.  Insert ramirez   13.  PPI for GI PPx (take PPI at home), enoxaparin for VTE prophylaxis        Code Status: Full code, mother to make decisions if he is unable     I discussed the patient's findings and my recommendations with the patient and bedside RN April     Estimated length of stay ?     Jairon Webb MD   06/14/19   7:21 AM

## 2019-06-14 NOTE — NURSING NOTE
Mother at bedside, states pt seizures started in April of this year and became progressively worse and he went for testing and was diagnosed with astrocytoma.

## 2019-06-14 NOTE — CONSULTS
NEUROSURGERY INITIAL HOSPITAL ENCOUNTER    Assessment/Plan:   Lukasz Savage is a 54 y.o. male.  He has a significant medical history of malignant hypertension, MSSA, Obstructive sleep apnea, Grade 3 Anaplastic Astrocytoma and Seizures.  He presents with a complaint of increased seizure-like activity.  Physical exam findings of he is currently intubated on low dose propofol, pupil irregularity, roving eye movement, and decorticate posturing.  His imaging shows stable, unchanged intracranial masses, and no evidence of hemorrhage.    Differential Diagnosis:   Grade 3 Anaplastic Astrocytoma   Seizure disorder    Recommendations:  Grade 3 anaplastic astrocytoma: Imaging reveals stable lesion.  Differential diagnosis for neurological decline is progressive tumor growth versus exacerbation of known focal motor seizures.  Obtain levels of Keppra and Depakote. EEG.     I discussed the patients findings and my recommendations with patient, family and consulting provider    Thank you very much for this interesting consult.     Level of Risk: High due to:  illness with threat to life or bodily function  MDM: High Complexity  Mod = 42783, High=99223  ___________________________________________________________________    Reason for consult: Worsening seizure activity    Chief Complaint:   Chief Complaint   Patient presents with   • Seizures     HPI: Lukasz Savage is a 54 y.o. male with a significant comorbidity  malignant hypertension, MSSA, Obstructive sleep apnea, Grade 3 Anaplastic Astrocytoma and Seizures.     Mr. Savage is a 54-year-old male presented to the ED with seizure-like activity.  Family states Mr. Savage has received 2 radiation treatments and 2 doses of oral chemotherapy over the past week.  At approximately 11:30 PM last evening, Mr. Savage reportedly began to vomit which gradually progressed over the course of several hours.  At approximately 12:30 AM, Mr. Savage slumped forward, falling from a  seated position striking the frontal part of his head on the floor.  At this point, Mr. Savage was breathing, but was somnolent and confused.  At approximately 2 AM, EMS was notified, and he was transported to [facility] ED for further evaluation.  Per the ED report, Mr. Savage had a syncopal-like episode and started seizing.  He was subsequently intubated for airway control and due to continued seizure-like activity.    Review of Systems   Intubated    Past Medical History:  has a past medical history of Angio-edema (7/3/2017), Anxiety, Arthritis, Cancer (CMS/[facility]) (05/09/2019), Clostridium difficile colitis, Erectile dysfunction (10/6/2016), GERD (gastroesophageal reflux disease), Gout, HCAP (healthcare-associated pneumonia) (7/11/2017), Hyperlipidemia, Malignant hypertension, MSSA (methicillin susceptible Staphylococcus aureus) infection (7/31/2017), Obstructive sleep apnea, and Seizures (CMS/[facility]) (7/4/2017).    Past Surgical History:  has a past surgical history that includes Tracheostomy tube placement (N/A, 7/12/2017); Shoulder arthroscopy (Left); Colonoscopy; and Craniotomy (Right, 4/15/2019).    Family History: family history includes Diabetes in his maternal grandmother and mother; Heart attack in his paternal grandfather; Heart disease in his father; Hypertension in his father and mother; Kidney disease in his sister; No Known Problems in his daughter, maternal grandfather, paternal grandmother, and son.    Social History:  reports that he quit smoking about 1 years ago. His smoking use included cigarettes. He has a 9.90 pack-year smoking history. He has never used smokeless tobacco. He reports that he does not drink alcohol or use drugs.    Allergies: Lipitor [atorvastatin] and Lisinopril    Home Medications:   Current Facility-Administered Medications:   •  acetaminophen (TYLENOL) tablet 650 mg, 650 mg, Oral, Q4H PRN **OR** acetaminophen (TYLENOL) suppository 650 mg, 650 mg, Rectal, Q4H PRN,  Robe Dorsey DO  •  chlorhexidine (PERIDEX) 0.12 % solution 15 mL, 15 mL, Mouth/Throat, Q12H, Robe Dorsey DO, 15 mL at 06/14/19 0950  •  enoxaparin (LOVENOX) syringe 40 mg, 40 mg, Subcutaneous, Q24H, Jairon Webb MD, 40 mg at 06/14/19 0950  •  lacosamide (VIMPAT) 150 mg in sodium chloride 0.9 % 50 mL IVPB, 150 mg, Intravenous, Q12H, Doreen Zuniga MD  •  levETIRAcetam (KEPPRA) 1,250 mg in sodium chloride 0.9 % 100 mL IVPB, 1,250 mg, Intravenous, Q12H, Doreen Zuniga MD  •  LORazepam (ATIVAN) injection 1 mg, 1 mg, Intravenous, Q5 Min PRN, Jairon Webb MD  •  ondansetron (ZOFRAN) injection 4 mg, 4 mg, Intravenous, Q6H PRN, Robe Dorsey DO  •  pantoprazole (PROTONIX) injection 40 mg, 40 mg, Intravenous, Q AM, Jairon Webb MD, 40 mg at 06/14/19 0950  •  propofol (DIPRIVAN) infusion 10 mg/mL 100 mL, 5-50 mcg/kg/min, Intravenous, Titrated, Ned Wilson MD, Last Rate: 12.45 mL/hr at 06/14/19 0943, 25 mcg/kg/min at 06/14/19 0943  •  sodium chloride 0.9 % flush 3 mL, 3 mL, Intravenous, Q12H, Robe Dorsey DO, 3 mL at 06/14/19 0949  •  sodium chloride 0.9 % flush 3-10 mL, 3-10 mL, Intravenous, PRN, Robe Dorsey DO  •  sodium chloride 0.9 % infusion, 100 mL/hr, Intravenous, Continuous, Jairon Webb MD, Last Rate: 100 mL/hr at 06/14/19 0944, 100 mL/hr at 06/14/19 0944  •  Valproic Acid (DEPAKENE) syrup 500 mg, 500 mg, Oral, Q6H, Doreen Zuniga MD    Medications: Scheduled Meds:  chlorhexidine 15 mL Mouth/Throat Q12H   enoxaparin 40 mg Subcutaneous Q24H   lacosamide (VIMPAT) IVPB 150 mg Intravenous Q12H   levETIRAcetam 1,250 mg Intravenous Q12H   pantoprazole 40 mg Intravenous Q AM   sodium chloride 3 mL Intravenous Q12H   Valproic Acid 500 mg Oral Q6H     Continuous Infusions:  propofol 5-50 mcg/kg/min Last Rate: 25 mcg/kg/min (06/14/19 0943)   sodium chloride 100 mL/hr Last Rate: 100 mL/hr (06/14/19 0944)     PRN Meds:.•  acetaminophen  **OR** acetaminophen  •  LORazepam  •  ondansetron  •  sodium chloride    Vital Signs  Temp:  [96.6 °F (35.9 °C)-97.9 °F (36.6 °C)] 97.9 °F (36.6 °C)  Heart Rate:  [62-85] 74  Resp:  [14-23] 23  BP: ()/(61-99) 109/81  FiO2 (%):  [30 %] 30 %    Physical Exam  Physical Exam   Constitutional: He appears well-developed and well-nourished. He appears ill. He is intubated.   HENT:   Head: Normocephalic and atraumatic.   Mouth/Throat: Mucous membranes are normal.   Eyes: Conjunctivae are normal.   Neck: Trachea normal and full passive range of motion without pain. Neck supple.   Cardiovascular: Normal rate and regular rhythm.   Pulmonary/Chest: He is intubated.   Abdominal: Soft. Normal appearance.   Skin: Skin is warm, dry and intact. He is not diaphoretic.   Nursing note and vitals reviewed.    Neurologic Exam     Mental Status   Intubated     Cranial Nerves     CN IX, X   CN X normal.   Roving eye movement     Motor Exam   Muscle bulk: normal  Decorticate posturing     Results Review:   Independent review and interpretation of imaging  Imaging Results (last 24 hours)     Procedure Component Value Units Date/Time    CT Cervical Spine Without Contrast [[id number]] Collected:  06/14/19 0809     Updated:  06/14/19 0814    Narrative:       EXAMINATION:  CT CERVICAL SPINE WO CONTRAST-  6/14/2019 4:46 AM CDT     HISTORY: fall, seizure      COMPARISON: No comparison study.     TECHNIQUE: Radiation dose equals  mGy-cm.  Automated exposure  control dose reduction technique was implemented.     Thin section axial imaging was obtained without intravenous contrast.  2-D sagittal and coronal reconstruction images were generated.     FINDINGS: Examination was sent to [facility] for preliminary  interpretation.     The facets are appropriately aligned.     The vertebral bodies are maintained. Prevertebral soft tissues are  normal without acute fracture or subluxation.     There is multilevel disc degeneration with disc space  narrowing endplate  irregularities and osteophyte formation C6-C7, C5-C6, C4-C5 and to a  lesser degree C3-C4.     There is mild posterior listhesis CT for on C5 and mild anterolisthesis  of C3 on C4.     There is relative canal foraminal narrowing at multiple levels  particularly at C5-C6.     There is no CT evidence of posttraumatic disc herniation.       Impression:       1. No CT evidence of acute bony injury to the cervical spine.  2. Multilevel disc degeneration spondylosis.  This report was finalized on 06/14/2019 08:11 by Dr. Pierre Pan MD.    CT Head Without Contrast [[id number]] Collected:  06/14/19 0755     Updated:  06/14/19 0812    Narrative:       EXAMINATION: CT HEAD WO CONTRAST-  6/14/2019 7:55 AM CDT     CT SCAN OF THE HEAD, WITHOUT CONTRAST:      HISTORY: Seizures, history of brain tumor previous right-sided frontal  craniotomy.     COMPARISONS: 06/03/2019 head CT      TECHNIQUE:     Radiation dose equals  mGy-cm.  Automated exposure control dose  reduction technique was implemented.        CT evaluation of the head without intravenous contrast. 5 mm transaxial  images were obtained.   2-D sagittal and coronal reconstruction images  were generated.     FINDINGS: Examination was sent to [facility] for preliminary  interpretation.     Changes from right craniotomy observed.     The right sided high density lesion is again observed superiorly in the  right frontal lobe, near the midline, extending from the corpus callosum  was noted previously and is essentially unchanged. Second probable high  density lesion observed more laterally in the right frontal lobe near  the craniotomy defect. The CT appearance is most likely unchanged.     There is no mass effect or midline shift.     There is no hydrocephalus.     There is no developing hemorrhage.     Mucosal thickening observed in the ethmoid sinuses.             Impression:       1. Stable CT appearance of the head compared to 06/03/2019, as  described  above.                              This report was finalized on 06/14/2019 08:08 by Dr. Pierre Pan MD.    XR Chest 1 View [[id number]] Collected:  06/14/19 0705     Updated:  06/14/19 0709    Narrative:       EXAM: XR CHEST 1 VW- - 6/14/2019 4:15 AM CDT     HISTORY: post intubation       COMPARISON: 07/15/2017.      TECHNIQUE:  1 images.  Frontal view of the chest.     FINDINGS:    Endotracheal tube tip projects 1.5 cm from the jolly. No pneumothorax  or large pleural effusion. No dense focal consolidation. Prominent  cardiac silhouette. Upper mediastinum within normal limits. No acute or  suspicious bony findings. Probable old right clavicle fracture.          Impression:       1. Endotracheal tube tip projects 1.5 cm above the jolly.  2. No focal consolidation.  This report was finalized on 06/14/2019 07:06 by Dr Sindy Mota MD.        MRI brain:  MRI spine:   CT Head:    CT c-spine:  CT t-spine:  CT l-spine:  X-ray:    I reviewed the patient's new clinical results.  Lab Results (last 24 hours)     Procedure Component Value Units Date/Time    Lactic Acid, Plasma [[id number]]  (Abnormal) Collected:  06/14/19 0914    Specimen:  Blood Updated:  06/14/19 0958     Lactate 2.1 mmol/L     Lactic Acid, Reflex Timer (This will reflex a repeat order 3-3:15 hours after ordered.) [[id number]] Collected:  06/14/19 0914    Specimen:  Blood Updated:  06/14/19 0958    Basic Metabolic Panel [[id number]]  (Abnormal) Collected:  06/14/19 0914    Specimen:  Blood Updated:  06/14/19 0951     Glucose 81 mg/dL      BUN 26 mg/dL      Creatinine 1.54 mg/dL      Sodium 141 mmol/L      Potassium 3.9 mmol/L      Chloride 110 mmol/L      CO2 24.0 mmol/L      Calcium 8.9 mg/dL      eGFR Non African Amer 47 mL/min/1.73      BUN/Creatinine Ratio 16.9     Anion Gap 7.0 mmol/L     Narrative:       GFR Normal >60  Chronic Kidney Disease <60  Kidney Failure <15    CK [[id number]]  (Normal) Collected:  06/14/19 0349    Specimen:   Blood Updated:  06/14/19 0804     Creatine Kinase 139 U/L     Phenytoin Level, Total [[id number]]  (Normal) Collected:  06/14/19 0724    Specimen:  Blood Updated:  06/14/19 0750     Phenytoin Level 16.4 mcg/mL     Levetiracetam Level (Keppra) [[id number]] Collected:  06/14/19 0724    Specimen:  Blood Updated:  06/14/19 0731    Blood Gas, Arterial With Co-Ox [[id number]]  (Abnormal) Collected:  06/14/19 0539    Specimen:  Arterial Blood Updated:  06/14/19 0540     Site Right Radial     Gera's Test N/A     pH, Arterial 7.467 pH units      Comment: 83 Value above reference range        pCO2, Arterial 27.0 mm Hg      Comment: 84 Value below reference range        pO2, Arterial 135.0 mm Hg      Comment: 83 Value above reference range        HCO3, Arterial 19.5 mmol/L      Comment: 84 Value below reference range        Base Excess, Arterial -2.9 mmol/L      Comment: 84 Value below reference range        O2 Saturation, Arterial 99.3 %      Comment: 83 Value above reference range        Hemoglobin, Blood Gas 13.3 g/dL      Comment: 84 Value below reference range        Hematocrit, Blood Gas 40.6 %      Oxyhemoglobin 97.7 %      Methemoglobin 1.10 %      Carboxyhemoglobin 0.5 %      A-a Gradiant -- mmHg      Comment: UNABLE TO CALCULATE        Temperature 37.0 C      Sodium, Arterial 141 mmol/L      Potassium, Arterial 3.8 mmol/L      Barometric Pressure for Blood Gas 754 mmHg      Modality Ventilator     FIO2 30 %      Ventilator Mode AC     Set Tidal Volume 600     Set Mech Resp Rate 14.0     PEEP 5.0     Note --     Collected by [id number]     Comment: Meter: [id number]     :  [id number]        pH, Temp Corrected -- pH Units      pCO2, Temperature Corrected -- mm Hg      pO2, Temperature Corrected -- mm Hg     [place] Draw [[id number]] Collected:  06/14/19 0349    Specimen:  Blood Updated:  06/14/19 0500    Narrative:       The following orders were created for panel order [place] Draw.  Procedure                                Abnormality         Status                     ---------                               -----------         ------                     Light Blue Top[[id number]]                                   Final result               Green Top (Gel)[[id number]]                                  Final result               Lavender Top[[id number]]                                     Final result               Red Top[[id number]]                                          Final result                 Please view results for these tests on the individual orders.    Light Blue Top [[id number]] Collected:  06/14/19 0349    Specimen:  Blood Updated:  06/14/19 0500     Extra Tube hold for add-on     Comment: Auto resulted       Green Top (Gel) [[id number]] Collected:  06/14/19 0349    Specimen:  Blood Updated:  06/14/19 0500     Extra Tube Hold for add-ons.     Comment: Auto resulted.       Lavender Top [[id number]] Collected:  06/14/19 0349    Specimen:  Blood Updated:  06/14/19 0500     Extra Tube hold for add-on     Comment: Auto resulted       Red Top [[id number]] Collected:  06/14/19 0349    Specimen:  Blood Updated:  06/14/19 0500     Extra Tube Hold for add-ons.     Comment: Auto resulted.       CBC & Differential [[id number]] Collected:  06/14/19 0349    Specimen:  Blood Updated:  06/14/19 0420    Narrative:       The following orders were created for panel order CBC & Differential.  Procedure                               Abnormality         Status                     ---------                               -----------         ------                     CBC Auto Differential[[id number]]        Abnormal            Final result                 Please view results for these tests on the individual orders.    CBC Auto Differential [[id number]]  (Abnormal) Collected:  06/14/19 0349    Specimen:  Blood Updated:  06/14/19 0420     WBC 11.21 10*3/mm3      RBC 4.54 10*6/mm3      Hemoglobin 12.9 g/dL      Hematocrit 39.0 %      MCV 85.9 fL       MCH 28.4 pg      MCHC 33.1 g/dL      RDW 15.1 %      RDW-SD 47.5 fl      MPV 10.4 fL      Platelets 274 10*3/mm3      Neutrophil % 71.8 %      Lymphocyte % 18.7 %      Monocyte % 7.7 %      Eosinophil % 0.3 %      Basophil % 0.2 %      Immature Grans % 1.3 %      Neutrophils, Absolute 8.05 10*3/mm3      Lymphocytes, Absolute 2.10 10*3/mm3      Monocytes, Absolute 0.86 10*3/mm3      Eosinophils, Absolute 0.03 10*3/mm3      Basophils, Absolute 0.02 10*3/mm3      Immature Grans, Absolute 0.15 10*3/mm3      nRBC 0.0 /100 WBC     Magnesium [[id number]]  (Abnormal) Collected:  06/14/19 0349    Specimen:  Blood Updated:  06/14/19 0419     Magnesium 2.3 mg/dL     Comprehensive Metabolic Panel [[id number]]  (Abnormal) Collected:  06/14/19 0349    Specimen:  Blood Updated:  06/14/19 0419     Glucose 113 mg/dL      BUN 27 mg/dL      Creatinine 1.70 mg/dL      Sodium 143 mmol/L      Potassium 5.4 mmol/L      Chloride 110 mmol/L      CO2 28.0 mmol/L      Calcium 8.9 mg/dL      Total Protein 6.3 g/dL      Albumin 3.50 g/dL      ALT (SGPT) 35 U/L      AST (SGOT) 39 U/L      Alkaline Phosphatase 64 U/L      Total Bilirubin 0.3 mg/dL      eGFR Non African Amer 42 mL/min/1.73      Globulin 2.8 gm/dL      A/G Ratio 1.3 g/dL      BUN/Creatinine Ratio 15.9     Anion Gap 5.0 mmol/L     Narrative:       GFR Normal >60  Chronic Kidney Disease <60  Kidney Failure <15    Valproic Acid Level, Total [[id number]]  (Normal) Collected:  06/14/19 0349    Specimen:  Blood Updated:  06/14/19 0417     Valproic Acid 84.4 mcg/mL         Jose Frausto APRN

## 2019-06-14 NOTE — PROGRESS NOTES
This patient started radiation for his grade 3 astrocytoma on June 12, 2019.  He has received 2 treatment fractions and at this time is admitted with status epilepticus and is intubated.    At this time we will hold his radiation until he recovers from his current acute situation is able to resume radiotherapy treatments.    We will reevaluate him next week for consideration of resuming radiation.

## 2019-06-14 NOTE — NURSING NOTE
Pt to CCU 9 per stretcher from services hospitalist with diagnosis status epilepticus, seizures. Pt sedated with propofol at 20 mcg/kg/min and intubated. Pt decorticate posturing for approximately 30 sec upon transfer to bed. PERRL, 4 mm. Intermittent twitch noted to left side of face and left hand.

## 2019-06-14 NOTE — CONSULTS
NEUROSURGERY INITIAL HOSPITAL ENCOUNTER    Assessment/Plan:   Lukasz Savage is a 54 y.o. male.  He has a medical history of  Malignant hypertension, MSSA, Obstructive sleep apnea, Grade 3 Anaplastic Astrocytoma and Seizures.He presents with a complaint of increased seizure-like activity and unresponsiveness. Physical exam findings intubation, not following commands, and extension posturing.  CT shows 2 stable intracranial masses, and no evidence of hemorrhage or herniation.     Differential Diagnosis:   Grade 3 Anaplastic Astrocytoma   Seizure disorder     Recommendations:  Grade 3 anaplastic astrocytoma: Imaging reveals stable lesion.  Differential diagnosis for neurological decline favors seizures vs status epilepticus. Admit to Hospitsalist and/or neurology service. Stat EEG.     and an extensive discussion with the patient's mother. Answer questions about prognosis, seizure management, and medications. Ship to your questions regarding whether or not the imaging stable. I informed her that regardless of imaging's clinical condition is continuing to decline rather rapidly.     I discussed the patients findings and my recommendations with family, nursing staff and consulting provider    Thank you very much for this interesting consult.     Level of Risk: High due to:  illness with threat to life or bodily function and abrupt change in neurological status  MDM: High Complexity  Mod = 55526, High=99223  ___________________________________________________________________    Reason for consult  Seizure in the setting of known grade 3 anaplastic astrocytoma    Chief Complaint:   Chief Complaint   Patient presents with   • Seizures       HPI: Lukasz Savage is a 54 y.o. male with a significant comorbidity  Anaplastic astrocytoma status post craniotomy by my partner Dr. Trevino you also is a known history of seizures with recent admission for uncontrollable simple partial seizures.       the patient was admitted  on 6/4/2019 for worsening left hand tremulous and seizure like activity. At that time he was on Keppra and Depakote. He was evaluated by neurology, Dr. Arroyo, who began then Pat on a taper. The patient's seizures were controlled and he was discharged home.    He been doing well at home until the night of  6/14/2019. The patient lives with his mother. Mother noticed that he had a sudden bout of vomiting at home. She often get to the bathroom and the patient seemed confused. Over the next 3 to 4 hours she had multiple interactions with patient. He was breathing during all this but his level of consciousness continue to decrease. This culminated in a grand mal seizure. After this the patient fell striking a sidetable. This fall was only a couple of feet and did not result in visible contusions. Due to the mother's inability to get the patient up she elected to activate EMS. Patient was transferred to [facility] were not contrasted CT was performed which was deemed stable to both the 6/4/2019 head CT and a recent MRI which is obtained as an outpatient. Their search was consult after further evaluation.    Review of Systems   Unable to perform ROS: Intubated        Past Medical History:  has a past medical history of Angio-edema (7/3/2017), Anxiety, Arthritis, Cancer (CMS/[facility]) (05/09/2019), Clostridium difficile colitis, Erectile dysfunction (10/6/2016), GERD (gastroesophageal reflux disease), Gout, HCAP (healthcare-associated pneumonia) (7/11/2017), Hyperlipidemia, Malignant hypertension, MSSA (methicillin susceptible Staphylococcus aureus) infection (7/31/2017), Obstructive sleep apnea, and Seizures (CMS/[facility]) (7/4/2017).    Past Surgical History:  has a past surgical history that includes Tracheostomy tube placement (N/A, 7/12/2017); Shoulder arthroscopy (Left); Colonoscopy; and Craniotomy (Right, 4/15/2019).    Family History: family history includes Diabetes in his maternal grandmother and mother; Heart attack  in his paternal grandfather; Heart disease in his father; Hypertension in his father and mother; Kidney disease in his sister; No Known Problems in his daughter, maternal grandfather, paternal grandmother, and son.    Social History:  reports that he quit smoking about 1 years ago. His smoking use included cigarettes. He has a 9.90 pack-year smoking history. He has never used smokeless tobacco. He reports that he does not drink alcohol or use drugs.    Allergies: Lipitor [atorvastatin] and Lisinopril    Home Medications:   Current Facility-Administered Medications:   •  acetaminophen (TYLENOL) tablet 650 mg, 650 mg, Oral, Q4H PRN **OR** acetaminophen (TYLENOL) suppository 650 mg, 650 mg, Rectal, Q4H PRN, Robe Dorsey DO  •  chlorhexidine (PERIDEX) 0.12 % solution 15 mL, 15 mL, Mouth/Throat, Q12H, Robe Dorsey DO  •  enoxaparin (LOVENOX) syringe 40 mg, 40 mg, Subcutaneous, Q24H, Jairon Webb MD  •  LORazepam (ATIVAN) injection 1 mg, 1 mg, Intravenous, Q5 Min PRN, Jairon Webb MD  •  ondansetron (ZOFRAN) injection 4 mg, 4 mg, Intravenous, Q6H PRN, Robe Dorsey DO  •  pantoprazole (PROTONIX) injection 40 mg, 40 mg, Intravenous, Q AM, Jairon Webb MD  •  propofol (DIPRIVAN) infusion 10 mg/mL 100 mL, 5-50 mcg/kg/min, Intravenous, Titrated, Ned Wilson MD, Last Rate: 9.96 mL/hr at 06/14/19 0504, 20 mcg/kg/min at 06/14/19 0504  •  sodium chloride 0.9 % flush 3 mL, 3 mL, Intravenous, Q12H, Robe Dorsey DO  •  sodium chloride 0.9 % flush 3-10 mL, 3-10 mL, Intravenous, PRN, Robe Dorsey DO  •  sodium chloride 0.9 % infusion, 100 mL/hr, Intravenous, Continuous, Jairon Webb MD    Medications: Scheduled Meds:  chlorhexidine 15 mL Mouth/Throat Q12H   enoxaparin 40 mg Subcutaneous Q24H   pantoprazole 40 mg Intravenous Q AM   sodium chloride 3 mL Intravenous Q12H     Continuous Infusions:  propofol 5-50 mcg/kg/min Last Rate: 20 mcg/kg/min (06/14/19 3454)   sodium  chloride 100 mL/hr      PRN Meds:.•  acetaminophen **OR** acetaminophen  •  LORazepam  •  ondansetron  •  sodium chloride    Vital Signs  Temp:  [96.6 °F (35.9 °C)-97.7 °F (36.5 °C)] 97.6 °F (36.4 °C)  Heart Rate:  [62-85] 73  Resp:  [14-23] 23  BP: ()/(61-99) 124/94  FiO2 (%):  [30 %] 30 %    Physical Exam  Physical Exam   Constitutional: He appears well-developed and well-nourished.   HENT:   Head: Normocephalic and atraumatic.   Eyes: Conjunctivae are normal. Pupils are equal, round, and reactive to light.   Fundoscopic exam:       The right eye shows no exudate, no hemorrhage and no papilledema.        The left eye shows no exudate, no hemorrhage and no papilledema.   Neck: Normal range of motion. Neck supple.   Cardiovascular:   Pulses:       Dorsalis pedis pulses are 2+ on the right side, and 2+ on the left side.        Posterior tibial pulses are 2+ on the right side, and 2+ on the left side.   Pulmonary/Chest: Effort normal. No respiratory distress.     Vascular Status -  His right foot exhibits no edema. His left foot exhibits no edema.  Neurological:   Reflex Scores:       Tricep reflexes are 2+ on the right side and 2+ on the left side.       Bicep reflexes are 2+ on the right side and 2+ on the left side.       Brachioradialis reflexes are 2+ on the right side and 2+ on the left side.       Patellar reflexes are 2+ on the right side and 2+ on the left side.       Achilles reflexes are 2+ on the right side and 2+ on the left side.  Skin: Skin is warm. No rash noted. No erythema.       Neurologic Exam     Mental Status   Level of consciousness: unresponsive to painful stimuli    Cranial Nerves     CN III, IV, VI   Pupils are equal, round, and reactive to light.    CN V   Right corneal reflex: normal  Left corneal reflex: normal    CN IX, X   Right gag reflex: normal   Roving eye movements with actively responsive pupils that are symmetric.  Gaze preference to the left     Motor Exam  Extensive  posturing of upper and lower extremities to external rub.     Gait, Coordination, and Reflexes     Reflexes   Right brachioradialis: 2+  Left brachioradialis: 2+  Right biceps: 2+  Left biceps: 2+  Right triceps: 2+  Left triceps: 2+  Right patellar: 2+  Left patellar: 2+  Right achilles: 2+  Left achilles: 2+      Results Review:   Independent review and interpretation of imaging  Imaging Results (last 24 hours)     Procedure Component Value Units Date/Time    CT Cervical Spine Without Contrast [[id number]] Collected:  06/14/19 0809     Updated:  06/14/19 0814    Narrative:       EXAMINATION:  CT CERVICAL SPINE WO CONTRAST-  6/14/2019 4:46 AM CDT     HISTORY: fall, seizure      COMPARISON: No comparison study.     TECHNIQUE: Radiation dose equals  mGy-cm.  Automated exposure  control dose reduction technique was implemented.     Thin section axial imaging was obtained without intravenous contrast.  2-D sagittal and coronal reconstruction images were generated.     FINDINGS: Examination was sent to statrad for preliminary  interpretation.     The facets are appropriately aligned.     The vertebral bodies are maintained. Prevertebral soft tissues are  normal without acute fracture or subluxation.     There is multilevel disc degeneration with disc space narrowing endplate  irregularities and osteophyte formation C6-C7, C5-C6, C4-C5 and to a  lesser degree C3-C4.     There is mild posterior listhesis CT for on C5 and mild anterolisthesis  of C3 on C4.     There is relative canal foraminal narrowing at multiple levels  particularly at C5-C6.     There is no CT evidence of posttraumatic disc herniation.       Impression:       1. No CT evidence of acute bony injury to the cervical spine.  2. Multilevel disc degeneration spondylosis.  This report was finalized on 06/14/2019 08:11 by Dr. Pierre Pan MD.    CT Head Without Contrast [[id number]] Collected:  06/14/19 0755     Updated:  06/14/19 0812    Narrative:        EXAMINATION: CT HEAD WO CONTRAST-  6/14/2019 7:55 AM CDT     CT SCAN OF THE HEAD, WITHOUT CONTRAST:      HISTORY: Seizures, history of brain tumor previous right-sided frontal  craniotomy.     COMPARISONS: 06/03/2019 head CT      TECHNIQUE:     Radiation dose equals  mGy-cm.  Automated exposure control dose  reduction technique was implemented.        CT evaluation of the head without intravenous contrast. 5 mm transaxial  images were obtained.   2-D sagittal and coronal reconstruction images  were generated.     FINDINGS: Examination was sent to statrad for preliminary  interpretation.     Changes from right craniotomy observed.     The right sided high density lesion is again observed superiorly in the  right frontal lobe, near the midline, extending from the corpus callosum  was noted previously and is essentially unchanged. Second probable high  density lesion observed more laterally in the right frontal lobe near  the craniotomy defect. The CT appearance is most likely unchanged.     There is no mass effect or midline shift.     There is no hydrocephalus.     There is no developing hemorrhage.     Mucosal thickening observed in the ethmoid sinuses.             Impression:       1. Stable CT appearance of the head compared to 06/03/2019, as described  above.                              This report was finalized on 06/14/2019 08:08 by Dr. Pierre Pan MD.    XR Chest 1 View [[id number]] Collected:  06/14/19 0705     Updated:  06/14/19 0709    Narrative:       EXAM: XR CHEST 1 VW- - 6/14/2019 4:15 AM CDT     HISTORY: post intubation       COMPARISON: 07/15/2017.      TECHNIQUE:  1 images.  Frontal view of the chest.     FINDINGS:    Endotracheal tube tip projects 1.5 cm from the jolly. No pneumothorax  or large pleural effusion. No dense focal consolidation. Prominent  cardiac silhouette. Upper mediastinum within normal limits. No acute or  suspicious bony findings. Probable old right clavicle  fracture.          Impression:       1. Endotracheal tube tip projects 1.5 cm above the jolly.  2. No focal consolidation.  This report was finalized on 06/14/2019 07:06 by Dr Sindy Mota MD.        MRI brain:  MRI spine:   CT Head:  CT c-spine:  CT t-spine:  CT l-spine:  X-ray:    I reviewed the patient's new clinical results.  Lab Results (last 24 hours)     Procedure Component Value Units Date/Time    CK [[id number]]  (Normal) Collected:  06/14/19 0349    Specimen:  Blood Updated:  06/14/19 0804     Creatine Kinase 139 U/L     Phenytoin Level, Total [[id number]]  (Normal) Collected:  06/14/19 0724    Specimen:  Blood Updated:  06/14/19 0750     Phenytoin Level 16.4 mcg/mL     Levetiracetam Level (Keppra) [[id number]] Collected:  06/14/19 0724    Specimen:  Blood Updated:  06/14/19 0731    Blood Gas, Arterial With Co-Ox [[id number]]  (Abnormal) Collected:  06/14/19 0539    Specimen:  Arterial Blood Updated:  06/14/19 0540     Site Right Radial     Gera's Test N/A     pH, Arterial 7.467 pH units      Comment: 83 Value above reference range        pCO2, Arterial 27.0 mm Hg      Comment: 84 Value below reference range        pO2, Arterial 135.0 mm Hg      Comment: 83 Value above reference range        HCO3, Arterial 19.5 mmol/L      Comment: 84 Value below reference range        Base Excess, Arterial -2.9 mmol/L      Comment: 84 Value below reference range        O2 Saturation, Arterial 99.3 %      Comment: 83 Value above reference range        Hemoglobin, Blood Gas 13.3 g/dL      Comment: 84 Value below reference range        Hematocrit, Blood Gas 40.6 %      Oxyhemoglobin 97.7 %      Methemoglobin 1.10 %      Carboxyhemoglobin 0.5 %      A-a Gradiant -- mmHg      Comment: UNABLE TO CALCULATE        Temperature 37.0 C      Sodium, Arterial 141 mmol/L      Potassium, Arterial 3.8 mmol/L      Barometric Pressure for Blood Gas 754 mmHg      Modality Ventilator     FIO2 30 %      Ventilator Mode AC     Set Tidal  Volume 600     Set [facility] Resp Rate 14.0     PEEP 5.0     Note --     Collected by [id number]     Comment: Meter: [id number]     :  [id number]        pH, Temp Corrected -- pH Units      pCO2, Temperature Corrected -- mm Hg      pO2, Temperature Corrected -- mm Hg     Mora Draw [[id number]] Collected:  06/14/19 0349    Specimen:  Blood Updated:  06/14/19 0500    Narrative:       The following orders were created for panel order [facility] Draw.  Procedure                               Abnormality         Status                     ---------                               -----------         ------                     Light Blue Top[[id number]]                                   Final result               Green Top (Gel)[[id number]]                                  Final result               Lavender Top[[id number]]                                     Final result               Red Top[[id number]]                                          Final result                 Please view results for these tests on the individual orders.    Light Blue Top [[id number]] Collected:  06/14/19 0349    Specimen:  Blood Updated:  06/14/19 0500     Extra Tube hold for add-on     Comment: Auto resulted       Green Top (Gel) [[id number]] Collected:  06/14/19 0349    Specimen:  Blood Updated:  06/14/19 0500     Extra Tube Hold for add-ons.     Comment: Auto resulted.       Lavender Top [[id number]] Collected:  06/14/19 0349    Specimen:  Blood Updated:  06/14/19 0500     Extra Tube hold for add-on     Comment: Auto resulted       Red Top [[id number]] Collected:  06/14/19 0349    Specimen:  Blood Updated:  06/14/19 0500     Extra Tube Hold for add-ons.     Comment: Auto resulted.       CBC & Differential [[id number]] Collected:  06/14/19 0349    Specimen:  Blood Updated:  06/14/19 0420    Narrative:       The following orders were created for panel order CBC & Differential.  Procedure                               Abnormality         Status                      ---------                               -----------         ------                     CBC Auto Differential[[id number]]        Abnormal            Final result                 Please view results for these tests on the individual orders.    CBC Auto Differential [[id number]]  (Abnormal) Collected:  06/14/19 0349    Specimen:  Blood Updated:  06/14/19 0420     WBC 11.21 10*3/mm3      RBC 4.54 10*6/mm3      Hemoglobin 12.9 g/dL      Hematocrit 39.0 %      MCV 85.9 fL      MCH 28.4 pg      MCHC 33.1 g/dL      RDW 15.1 %      RDW-SD 47.5 fl      MPV 10.4 fL      Platelets 274 10*3/mm3      Neutrophil % 71.8 %      Lymphocyte % 18.7 %      Monocyte % 7.7 %      Eosinophil % 0.3 %      Basophil % 0.2 %      Immature Grans % 1.3 %      Neutrophils, Absolute 8.05 10*3/mm3      Lymphocytes, Absolute 2.10 10*3/mm3      Monocytes, Absolute 0.86 10*3/mm3      Eosinophils, Absolute 0.03 10*3/mm3      Basophils, Absolute 0.02 10*3/mm3      Immature Grans, Absolute 0.15 10*3/mm3      nRBC 0.0 /100 WBC     Magnesium [[id number]]  (Abnormal) Collected:  06/14/19 0349    Specimen:  Blood Updated:  06/14/19 0419     Magnesium 2.3 mg/dL     Comprehensive Metabolic Panel [[id number]]  (Abnormal) Collected:  06/14/19 0349    Specimen:  Blood Updated:  06/14/19 0419     Glucose 113 mg/dL      BUN 27 mg/dL      Creatinine 1.70 mg/dL      Sodium 143 mmol/L      Potassium 5.4 mmol/L      Chloride 110 mmol/L      CO2 28.0 mmol/L      Calcium 8.9 mg/dL      Total Protein 6.3 g/dL      Albumin 3.50 g/dL      ALT (SGPT) 35 U/L      AST (SGOT) 39 U/L      Alkaline Phosphatase 64 U/L      Total Bilirubin 0.3 mg/dL      eGFR Non African Amer 42 mL/min/1.73      Globulin 2.8 gm/dL      A/G Ratio 1.3 g/dL      BUN/Creatinine Ratio 15.9     Anion Gap 5.0 mmol/L     Narrative:       GFR Normal >60  Chronic Kidney Disease <60  Kidney Failure <15    Valproic Acid Level, Total [[id number]]  (Normal) Collected:  06/14/19 0349    Specimen:  Blood  Updated:  06/14/19 0417     Valproic Acid 84.4 mcg/mL           Delio Crowe MD

## 2019-06-14 NOTE — PROGRESS NOTES
Discharge Planning Assessment  [facility]     Patient Name: Lukasz Savage  MRN: [id number]  Today's Date: 6/14/2019    Admit Date: 6/14/2019    Discharge Needs Assessment     Row Name 06/14/19 1054       Living Environment    Lives With  alone    Current Living Arrangements  home/apartment/condo    Primary Care Provided by  self    Provides Primary Care For  no one    Quality of Family Relationships  unable to assess    Able to Return to Prior Arrangements  yes       Transition Planning    Patient/Family Anticipated Services at Transition         Discharge Needs Assessment    Readmission Within the Last 30 Days  no previous admission in last 30 days    Concerns to be Addressed  discharge planning    Equipment Currently Used at Home  none    Current Discharge Risk  chronically ill    Discharge Coordination/Progress  Patient currently in CCU on vent.  Chart reviewed.  Patient resides alone, has a PCP and RX coverage.  SW will follow for plan/needs.        Discharge Plan    No documentation.       Destination      No service coordination in this encounter.      Durable Medical Equipment      No service coordination in this encounter.      Dialysis/Infusion      No service coordination in this encounter.      Home Medical Care      No service coordination in this encounter.      Therapy      No service coordination in this encounter.      Community Resources      No service coordination in this encounter.          Demographic Summary    No documentation.       Functional Status    No documentation.       Psychosocial    No documentation.       Abuse/Neglect    No documentation.       Legal    No documentation.       Substance Abuse    No documentation.       Patient Forms    No documentation.           HENRIETTA Nava

## 2019-06-14 NOTE — CONSULTS
MEDICAL ONCOLOGY CONSULTATION    Pt Name: Lukasz Savage  MRN: [id number]  [id number]  YOB: 1965  Date of evaluation: 6/14/2019   CCU 9    Reason for Consultation:  Continuity of care    Requesting Physician:  Hospitalist    History Obtained From:  FAMILY and CHART    HISTORY OF PRESENT ILLNESS:    Mr. 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  He presented to the hospital with seizure activity and was admitted for treatment.  Medical oncology consultation was requested for continuity of care.    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 Trevino for diagnosis of primary brain tumor, grade 3 astrocytoma. The patient was being seen by neurology with complaints of left facial and upper extremity.    4/2/2019-MRI brain with contrast showed changes in the frontal convexity with a fullness of the sulcal gyral pattern. Specifically, 4.5 x 4.5 x 3.5 cm right frontal lesion. Second, discrete hyperintense nodule measuring 1.1 x 1.9 x 1.5 cm in the subcortical white matter of the paramedian posterior right frontal lobe immediately above the corpus callosum. This was concerning for high-grade glioma.   4/15/2019-he underwent a craniotomy with stereotactic biopsy by Dr. Dillon Trevino at . 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 Further molecular analysis at [facility] revealed IDH1/2 wild type, MGMT non-methylated, TERT mutation. Complex cytogenetics changes A maximum safe resection was not possible due to concerns of significant sequela. Second opinion was recommended at the [facility].   5/17/2019-he was seen by   Oral Jessica. Recommended concurrent chemoradiation.   5/24/2019-he was first seen by me. Recommended concurrent chemoradiation with Temodar.      Past Medical History:    Past Medical History:   Diagnosis Date   • Angio-edema 7/3/2017   • Anxiety    • Arthritis    • Cancer (CMS/HCC) 05/09/2019    Brain cancer diagnoised yesterday  Dr Trevino    • Clostridium difficile colitis    • Erectile dysfunction 10/6/2016   • GERD (gastroesophageal reflux disease)    • Gout    • HCAP (healthcare-associated pneumonia) 7/11/2017   • Hyperlipidemia    • Malignant hypertension    • MSSA (methicillin susceptible Staphylococcus aureus) infection 7/31/2017   • Obstructive sleep apnea    • Seizures (CMS/HCC) 7/4/2017       Past Surgical History:    Past Surgical History:   Procedure Laterality Date   • COLONOSCOPY     • CRANIOTOMY Right 4/15/2019    Procedure: CRANIOTOMY WITH BIOPSY RIGHT;  Surgeon: Dillon Trevino MD;  Location: [facility];  Service: Neurosurgery   • SHOULDER ARTHROSCOPY Left    • TRACHEOSTOMY N/A 7/12/2017    Procedure: TRACHEOSTOMY WITH TRACHEOSCOPY;  Surgeon: Jairon Pierson MD;  Location: [facility] OR;  Service:        Immunizations:    Immunization History   Administered Date(s) Administered   • Flu Vaccine Quad PF >36MO 11/20/2017   • flucelvax quad pfs =>4 YRS 11/20/2018       Current Hospital Medications:      Current Facility-Administered Medications:   •  acetaminophen (TYLENOL) tablet 650 mg, 650 mg, Oral, Q4H PRN **OR** acetaminophen (TYLENOL) suppository 650 mg, 650 mg, Rectal, Q4H PRN, Robe Dorsey DO  •  chlorhexidine (PERIDEX) 0.12 % solution 15 mL, 15 mL, Mouth/Throat, Q12H, Robe Dorsey DO, 15 mL at 06/14/19 0950  •  enoxaparin (LOVENOX) syringe 40 mg, 40 mg, Subcutaneous, Q24H, Jairon Webb MD, 40 mg at 06/14/19 0950  •  LORazepam (ATIVAN) injection 1 mg, 1 mg, Intravenous, Q5 Min PRN, Jairon Webb MD  •  ondansetron (ZOFRAN) injection 4 mg, 4 mg, Intravenous, Q6H PRN,  Robe Dorsey DO  •  pantoprazole (PROTONIX) injection 40 mg, 40 mg, Intravenous, Q AM, Jairon Webb MD, 40 mg at 19 0950  •  propofol (DIPRIVAN) infusion 10 mg/mL 100 mL, 5-50 mcg/kg/min, Intravenous, Titrated, Ned Wilson MD, Last Rate: 12.45 mL/hr at 19, 25 mcg/kg/min at 1943  •  sodium chloride 0.9 % flush 3 mL, 3 mL, Intravenous, Q12H, Robe Dorsey DO, 3 mL at 19 0949  •  sodium chloride 0.9 % flush 3-10 mL, 3-10 mL, Intravenous, PRN, Robe Dorsey DO  •  sodium chloride 0.9 % infusion, 100 mL/hr, Intravenous, Continuous, Jairon Webb MD, Last Rate: 100 mL/hr at 19, 100 mL/hr at 19    Allergies:   Allergies   Allergen Reactions   • Lipitor [Atorvastatin] Rash   • Lisinopril Angioedema     Swell tongue       Social History:    Social History     Socioeconomic History   • Marital status: Single     Spouse name: Not on file   • Number of children: 2   • Years of education: Not on file   • Highest education level: Not on file   Occupational History   • Occupation: ELECTRIAL WORK   Tobacco Use   • Smoking status: Former Smoker     Packs/day: 0.33     Years: 30.00     Pack years: 9.90     Types: Cigarettes     Last attempt to quit: 7/3/2017     Years since quittin.9   • Smokeless tobacco: Never Used   Substance and Sexual Activity   • Alcohol use: No     Frequency: Never     Comment: used to drink alot but now just ocasional beer since has seizure in 2017   • Drug use: No   • Sexual activity: Defer       Family History:   Family History   Problem Relation Age of Onset   • Hypertension Mother    • Diabetes Mother    • Heart disease Father    • Hypertension Father    • No Known Problems Daughter    • No Known Problems Son    • Diabetes Maternal Grandmother    • No Known Problems Maternal Grandfather    • No Known Problems Paternal Grandmother    • Heart attack Paternal Grandfather    • Kidney disease Sister        REVIEW OF  "SYSTEMS:    Constitutional: no fever    Lungs: positive intubation  CVS: no palpitation, no chest pain  GI: no abdominal pain, no nausea , no vomiting  DIPAK: no dysuria, frequency and urgency, no hematuria  Musculoskeletal: no joint pain, swelling , stiffness  Endocrine: no polyuria, polydipsia  Hematology: no anemia, no easy brusing or bleeding  Dermatology: no skin rash, no eczema, no pruritus  Psychiatry: no depression, no anxiety,no panic attacks, no suicide ideation  Neurology: positive for anaplastic glioma, seizures    Vitals:    /81   Pulse 74   Temp 97.9 °F (36.6 °C) (Oral)   Resp 23   Ht 172.7 cm (68\")   Wt 83 kg (183 lb)   SpO2 100%   BMI 27.83 kg/m²     PHYSICAL EXAM:    CONSTITUTIONAL: Intubated, sedated in CCU   NECK: Supple, no masses   CHEST/LUNGS: CTA bilaterally, normal respiratory effort   CARDIOVASCULAR: RRR, no murmurs  ABDOMEN: soft non-tender, active bowel sounds, no HSM  EXTREMITIES: BLE SCD's  SKIN: warm, dry with no rashes or lesions    CBC  Results from last 7 days   Lab Units 06/14/19  0349   WBC 10*3/mm3 11.21*   HEMOGLOBIN g/dL 12.9*   HEMATOCRIT % 39.0*   PLATELETS 10*3/mm3 274         Lab Results   Component Value Date     06/14/2019    K 3.9 06/14/2019     06/14/2019    CO2 24.0 06/14/2019    BUN 26 (H) 06/14/2019    CREATININE 1.54 (H) 06/14/2019    GLUCOSE 81 06/14/2019    CALCIUM 8.9 06/14/2019    BILITOT 0.3 06/14/2019    ALKPHOS 64 06/14/2019    AST 39 06/14/2019    ALT 35 06/14/2019    AGRATIO 1.3 06/14/2019    GLOB 2.8 06/14/2019       Lab Results   Component Value Date    INR 0.98 06/03/2019    INR 0.94 03/22/2018    PROTIME 13.3 06/03/2019    PROTIME 12.8 03/22/2018       Cultures:    No results found for: BLOODCX  No components found for: URINCX     CT HEAD WO CONTRAST-  6/14/2019 7:55 AM CDT     HISTORY: Seizures, history of brain tumor previous right-sided frontal  craniotomy.     COMPARISONS: 06/03/2019 head CT      TECHNIQUE:     Radiation dose " equals  mGy-cm.  Automated exposure control dose  reduction technique was implemented.        CT evaluation of the head without intravenous contrast. 5 mm transaxial  images were obtained.   2-D sagittal and coronal reconstruction images  were generated.     FINDINGS: Examination was sent to statrad for preliminary  interpretation.     Changes from right craniotomy observed.     The right sided high density lesion is again observed superiorly in the  right frontal lobe, near the midline, extending from the corpus callosum  was noted previously and is essentially unchanged. Second probable high  density lesion observed more laterally in the right frontal lobe near  the craniotomy defect. The CT appearance is most likely unchanged.     There is no mass effect or midline shift.     There is no hydrocephalus.     There is no developing hemorrhage.     Mucosal thickening observed in the ethmoid sinuses.           IMPRESSION:  1. Stable CT appearance of the head compared to 06/03/2019, as described  above.                            ASSESSMENT/PLAN:  Anaplastic glioma    He has completed 2 XRT treatment and did take Temodar for 2 days.  Temodar on hold at present    WBC 11.21  HGB 12.9  ,000      Seizure activity    CCU monitoring  Vimpat  Depakene  Keppra    Intubated, sedated in CCU    Propofol to be weaned tonight    Discussed with Pt's mother and daughter      YEVGENIY Hammond    06/14/19  10:58 AM

## 2019-06-14 NOTE — PLAN OF CARE
Problem: Patient Care Overview  Goal: Plan of Care Review   06/14/19 7229   OTHER   Outcome Summary Called the [facility] here to let them know that he is in our unit as his mother said he had an appointment at 0840, they were unable to verify and thought someone here is aware and has taken him off the schedule.Don't know when he last voided Dr. Webb has ordered to place a ramirez, and to give bolus after EEG tech is done testing.

## 2019-06-14 NOTE — PLAN OF CARE
Problem: Patient Care Overview  Goal: Plan of Care Review   06/14/19 0801   OTHER   Outcome Summary Sedation paused at 0741 for EEG tech/test.

## 2019-06-14 NOTE — PLAN OF CARE
Problem: Patient Care Overview  Goal: Plan of Care Review   06/14/19 1001   OTHER   Outcome Summary Dr. Webb ordered verbal order not to do protocol in and out cathx2  he ordered to place ramirez this am, close to 1000ml filled the bag immediately.

## 2019-06-14 NOTE — ED PROVIDER NOTES
Subjective   Patient with a history of a grade 3 astrocytoma has history of seizures at home he had an episode in which he passed out started seizing may have hit his head EMS got there the patient was very agitated and combative had another episode of seizure was taken vomiting on the Valium on arrival to ER the patient obtunded with a low GCS score airway has been protected discussed this with the family went ahead and intubate the patient        Seizures   Seizure activity on arrival: yes    Seizure type:  Focal  Initial focality:  Facial  Episode characteristics: abnormal movements, combativeness, confusion, focal shaking, incontinence, stiffening, tongue biting and unresponsiveness    Episode characteristics: no apnea, no limpness and fully responsive    Postictal symptoms: confusion    Return to baseline: no    Severity:  Severe  Timing:  Intermittent  Progression:  Worsening  Context: intracranial lesion    Context: not alcohol withdrawal, not cerebral palsy, not change in medication, not sleeping less, not family hx of seizures and not previous head injury    Recent head injury:  No recent head injuries  PTA treatment:  Diazepam  History of seizures: yes        Review of Systems   Unable to perform ROS: Mental status change   Neurological: Positive for seizures.       Past Medical History:   Diagnosis Date   • Angio-edema 7/3/2017   • Anxiety    • Arthritis    • Cancer (CMS/[facility]) 05/09/2019    Brain cancer diagnoised yesterday  Dr Trevino    • Clostridium difficile colitis    • Erectile dysfunction 10/6/2016   • GERD (gastroesophageal reflux disease)    • Gout    • HCAP (healthcare-associated pneumonia) 7/11/2017   • Hyperlipidemia    • Malignant hypertension    • MSSA (methicillin susceptible Staphylococcus aureus) infection 7/31/2017   • Obstructive sleep apnea    • Seizures (CMS/[facility]) 7/4/2017       Allergies   Allergen Reactions   • Lipitor [Atorvastatin] Rash   • Lisinopril Angioedema     Swell tongue        Past Surgical History:   Procedure Laterality Date   • COLONOSCOPY     • CRANIOTOMY Right 4/15/2019    Procedure: CRANIOTOMY WITH BIOPSY RIGHT;  Surgeon: Dillon Trevino MD;  Location: [facility] OR;  Service: Neurosurgery   • SHOULDER ARTHROSCOPY Left    • TRACHEOSTOMY N/A 2017    Procedure: TRACHEOSTOMY WITH TRACHEOSCOPY;  Surgeon: Jairon Pierson MD;  Location:  [facility] OR;  Service:        Family History   Problem Relation Age of Onset   • Hypertension Mother    • Diabetes Mother    • Heart disease Father    • Hypertension Father    • No Known Problems Daughter    • No Known Problems Son    • Diabetes Maternal Grandmother    • No Known Problems Maternal Grandfather    • No Known Problems Paternal Grandmother    • Heart attack Paternal Grandfather    • Kidney disease Sister        Social History     Socioeconomic History   • Marital status: Single     Spouse name: Not on file   • Number of children: 2   • Years of education: Not on file   • Highest education level: Not on file   Occupational History   • Occupation: ELECTRIAL WORK   Tobacco Use   • Smoking status: Former Smoker     Packs/day: 0.33     Years: 30.00     Pack years: 9.90     Types: Cigarettes     Last attempt to quit: 7/3/2017     Years since quittin.9   • Smokeless tobacco: Never Used   Substance and Sexual Activity   • Alcohol use: No     Frequency: Never     Comment: used to drink alot but now just ocasional beer since has seizure in 2017   • Drug use: No   • Sexual activity: Defer           Objective   Physical Exam   Constitutional: He appears well-developed and well-nourished. He appears distressed.   HENT:   Head: Normocephalic and atraumatic.   Eyes: Conjunctivae and EOM are normal. Pupils are equal, round, and reactive to light.   Neck: Normal range of motion. Neck supple.   Cardiovascular: Normal rate, regular rhythm and normal heart sounds.   Pulmonary/Chest: Effort normal and breath sounds normal. Tachypnea noted.   Abdominal:  Soft. Bowel sounds are normal.   Musculoskeletal: Normal range of motion.   Neurological: He is alert. He has normal reflexes. He is disoriented. No cranial nerve deficit or sensory deficit. GCS eye subscore is 2. GCS verbal subscore is 2. GCS motor subscore is 2.   Focal twitching left forearm is noted   Skin: Skin is warm and dry.   Psychiatric: He has a normal mood and affect.   Nursing note and vitals reviewed.      Intubation  Date/Time: 6/14/2019 4:22 AM  Performed by: Ned Wilson MD  Authorized by: Ned Wilson MD     Consent:     Consent obtained:  Emergent situation    Consent given by:  Parent    Risks discussed:  Aspiration, bleeding, brain injury, death, hypoxia, dental trauma, laryngeal injury and pneumothorax    Alternatives discussed:  Delayed treatment  Pre-procedure details:     Patient status:  Unresponsive    Mallampati score:  III    Pretreatment medications:  Lidocaine    Paralytics:  Succinylcholine  Procedure details:     Preoxygenation:  Bag valve mask    CPR in progress: no      Intubation method:  Oral    Oral intubation technique:  Video-assisted    Laryngoscope blade:  Ward 4    Tube size (mm):  7.5    Tube type:  Cuffed    Number of attempts:  1    Ventilation between attempts: no      Cricoid pressure: no      Tube visualized through cords: yes    Placement assessment:     Tube secured with:  Adhesive tape    Breath sounds:  Equal    Placement verification: chest rise, condensation, CXR verification, direct visualization and equal breath sounds    Post-procedure details:     Patient tolerance of procedure:  Tolerated well, no immediate complications    Critical Care  Performed by: Ned Wilson MD  Authorized by: Ned Wilson MD     Critical care provider statement:     Critical care time (minutes):  45    Critical care time was exclusive of:  Separately billable procedures and treating other patients    Critical care was necessary to treat or prevent imminent or  life-threatening deterioration of the following conditions:  CNS failure or compromise    Critical care was time spent personally by me on the following activities:  Blood draw for specimens, discussions with consultants, discussions with primary provider, examination of patient, review of old charts, re-evaluation of patient's condition, obtaining history from patient or surrogate, ordering and review of laboratory studies, ordering and performing treatments and interventions, ordering and review of radiographic studies, pulse oximetry, development of treatment plan with patient or surrogate and evaluation of patient's response to treatment               ED Course  ED Course as of Jun 14 0555 Fri Jun 14, 2019   0422 Side and intubated.  And was given Cerebyx.  He was still having some seizure activity after that was placed on the prevent I have discussed this case with Dr. Crowe and also with Dr. Gonzalez with consultation  [TS]      ED Course User Index  [TS] Ned Wilson MD                  [facility]      Final diagnoses:   Status epilepticus (CMS/HCC)   Astrocytoma brain tumor (CMS/HCC)            Ned Wilson MD  06/14/19 9588

## 2019-06-14 NOTE — PLAN OF CARE
Problem: Patient Care Overview  Goal: Plan of Care Review  Outcome: Ongoing (interventions implemented as appropriate)   06/14/19 9372   OTHER   Outcome Summary Pt sedated on mechanical vent, intubated on 6/14/19. Con to follow.

## 2019-06-14 NOTE — PAYOR COMM NOTE
"FROM: HANK SOLIS  PHONE: [phone]  FAX: [phone]    ID: [id number]    Lukasz Wahl Mary (54 y.o. Male)     Date of Birth Social Security Number Address Home Phone MRN    [id number]  [id number]  [place] [address] [id number]    Zoroastrian Marital Status          [facility] Single       Admission Date Admission Type Admitting Provider Attending Provider Department, Room/Bed    6/14/19 Emergency Jairon Webb MD Fleming, John Eric, MD [facility] CARDIAC CARE, C009/1    Discharge Date Discharge Disposition Discharge Destination                       Attending Provider:  Jairon Webb MD    Allergies:  Lipitor [Atorvastatin], Lisinopril    Isolation:  None   Infection:  C.difficile (07/07/17)   Code Status:  CPR    Ht:  172.7 cm (68\")   Wt:  83 kg (183 lb)    Admission Cmt:  None   Principal Problem:  None                Active Insurance as of 6/14/2019     Primary Coverage     Payor Plan Insurance Group Employer/Plan Group    Formerly [facility] [employer] [facility] [employer] [place]      Payor Plan Address Payor Plan Phone Number Payor Plan Fax Number Effective Dates    [address] [address]   7/1/2017 - None Entered    [place] [address]       Subscriber Name Subscriber Birth Date Member ID       LUKASZ WAHL MARY 1965 [id number]                 Emergency Contacts      (Rel.) Home Phone Work Phone Mobile Phone    Ivonne Wahl (Mother) [phone] -- --    Yvette Pollack (Daughter) [phone] -- [phone]               History & Physical      Jairon Webb MD at 6/14/2019  7:21 AM              [facility]  HISTORY AND PHYSICAL    Date of Admission: 6/14/2019  Primary Care Physician: Linda Hoffman, DNP, APRN    Subjective     Chief Complaint: seizures    History of Present Illness    Mr. Wahl is a 55 yo M who presents with a seizure.  Patient has a history of a known grade 3 " astrocytoma.  He has been following with neurology and neurosurgery.  Patient is supposed to undergo chemotherapy and radiation therapy.  Patient was diagnosed in April 2019.      Per ER report, the patient passed out and started seizing and may have had head trauma.  When he arrived he was very agitated and confused and then had repeated seizures.  He was subsequently intubated for continued seizures and airway protection.    Patient was intubated and sedated when I arrived to being hooked up to EEG.  No family at bedside.    In the ER, it was noted that he had a creatinine of 1.7, potassium of 5.4, and a mild leukocytosis thought to be reactive.  Patient also has a mild anemia.  Patient's baseline creatinine is that of chronic kidney disease stage III and is approximately 1.2-1.3.      Review of Systems   Unable to perform ROS: Intubated      Otherwise complete ROS reviewed and negative except as mentioned in the HPI.    Past Medical History:   Past Medical History:   Diagnosis Date   • Angio-edema 7/3/2017   • Anxiety    • Arthritis    • Cancer (CMS/HCC) 05/09/2019    Brain cancer diagnoised yesterday  Dr Trevino    • Clostridium difficile colitis    • Erectile dysfunction 10/6/2016   • GERD (gastroesophageal reflux disease)    • Gout    • HCAP (healthcare-associated pneumonia) 7/11/2017   • Hyperlipidemia    • Malignant hypertension    • MSSA (methicillin susceptible Staphylococcus aureus) infection 7/31/2017   • Obstructive sleep apnea    • Seizures (CMS/HCC) 7/4/2017     Past Surgical History:  Past Surgical History:   Procedure Laterality Date   • COLONOSCOPY     • CRANIOTOMY Right 4/15/2019    Procedure: CRANIOTOMY WITH BIOPSY RIGHT;  Surgeon: Dillon Trevino MD;  Location: [facility] OR;  Service: Neurosurgery   • SHOULDER ARTHROSCOPY Left    • TRACHEOSTOMY N/A 7/12/2017    Procedure: TRACHEOSTOMY WITH TRACHEOSCOPY;  Surgeon: Jairon Pierson MD;  Location: [facility] OR;  Service:      Social History:  reports  that he quit smoking about 1 years ago. His smoking use included cigarettes. He has a 9.90 pack-year smoking history. He has never used smokeless tobacco. He reports that he does not drink alcohol or use drugs.    Family History: family history includes Diabetes in his maternal grandmother and mother; Heart attack in his paternal grandfather; Heart disease in his father; Hypertension in his father and mother; Kidney disease in his sister; No Known Problems in his daughter, maternal grandfather, paternal grandmother, and son.       Allergies:  Allergies   Allergen Reactions   • Lipitor [Atorvastatin] Rash   • Lisinopril Angioedema     Swell tongue     Medications:  Prior to Admission medications    Medication Sig Start Date End Date Taking? Authorizing Provider   allopurinol (ZYLOPRIM) 300 MG tablet Take 300 mg by mouth Daily.    Provider, MD Phil   ALPRAZolam (XANAX) 0.25 MG tablet Take 1 tablet by mouth 2 (Two) Times a Day As Needed for Anxiety.  Patient taking differently: Take 0.25 mg by mouth 3 (Three) Times a Day As Needed for Anxiety. 5/20/19   Linda Hoffman DNP, APRN   aspirin 81 MG tablet Take 1 tablet by mouth Daily. 2/28/19   Royal Todd PA   carvedilol (COREG) 25 MG tablet Take 1 tablet by mouth 2 (Two) Times a Day With Meals. 5/20/19   Linda Hoffman DNP, APRN   CloNIDine (CATAPRES) 0.1 MG tablet Take 1 tablet by mouth Every Night. Check BP 4x daily. If> 180 SBP take x1.  Patient taking differently: Take 0.1 mg by mouth 2 (Two) Times a Day As Needed for High Blood Pressure. Check BP 4x daily. If> 180 SBP take x1. 5/20/19   Linda Hoffman DNP, APRN   colchicine 0.6 MG tablet Take 2 tabs now, repeat 1 tab in an hour.  May repeat same steps again in 24 hours if needed.  He has chronic gout  Patient taking differently: Take 0.6 mg by mouth Daily As Needed. Take 2 tabs now, repeat 1 tab in an hour.  May repeat same steps again in 24 hours if needed.  He has chronic gout  11/20/18   Linda Hoffman DNP, APRN   cyclobenzaprine (FLEXERIL) 10 MG tablet Take 1 tablet by mouth 3 (Three) Times a Day As Needed for Muscle Spasms. 5/17/19   Oral Jessica III, MD   dexamethasone (DECADRON) 1 MG tablet Take 1 mg by mouth 4 (Four) Times a Day. 5/1/19   Phil Constantino MD   divalproex (DEPAKOTE) 500 MG DR tablet Take 2 tablets by mouth 2 (Two) Times a Day. 5/31/19   Royal Todd PA   divalproex (DEPAKOTE) 500 MG DR tablet Take 2 tablets by mouth 2 (Two) Times a Day. 6/6/19   Tommie Thornton APRN   fenofibrate (TRICOR) 145 MG tablet Take 1 tablet by mouth Daily. 5/20/19   Linda Hoffman DNP, APRN   furosemide (LASIX) 20 MG tablet Take 20 mg by mouth Daily As Needed (Swelling).    Phil Constantino MD   lacosamide (VIMPAT) 100 MG tablet tablet Take 1 tablet by mouth Every 12 (Twelve) Hours. 6/12/19   Harpreet Arroyo MD   levETIRAcetam (KEPPRA) 750 MG tablet Take 2 tablets by mouth Every 12 (Twelve) Hours. 6/6/19   Tommie Thornton APRN   losartan (COZAAR) 100 MG tablet Take 100 mg by mouth Daily.    Phil Constantino MD   Omega-3 Fatty Acids (FISH OIL) 1000 MG capsule capsule Take 2 capsules by mouth Daily With Breakfast. 5/20/19   Linda Hoffman DNP, APRN   omeprazole (PRILOSEC) 10 MG capsule Take 1 capsule by mouth Daily. For acid reflux 5/20/19   Linda Hoffman DNP, APRN   oxyCODONE-acetaminophen (PERCOCET)  MG per tablet Take 1 tablet by mouth Every 4 (Four) Hours As Needed for Moderate Pain . 5/17/19   Oral Jessica III, MD   QUEtiapine (SEROquel) 100 MG tablet Take 1 tablet by mouth every night at bedtime. 2/22/19   Linda Hoffman DNP, APRN   spironolactone (ALDACTONE) 25 MG tablet Take 25 mg by mouth Daily.    Provider, MD Phil   venlafaxine XR (EFFEXOR-XR) 150 MG 24 hr capsule Take 1 capsule by mouth Daily. 5/20/19   Linda Hoffman, SUSHMA, APRN   vitamin B-6 (PYRIDOXINE) 100 MG tablet Take 1 tablet by mouth  "Daily. 4/3/19   Royal Todd PA   methylPREDNISolone (MEDROL, PARKER,) 4 MG tablet Take as directed on package instructions. 6/6/19 6/14/19  Tommie Thornton APRN     Objective     Vital Signs: /94 (BP Location: Left arm, Patient Position: Lying)   Pulse 73   Temp 97.6 °F (36.4 °C) (Rectal)   Resp 23   Ht 172.7 cm (68\")   Wt 83 kg (183 lb)   SpO2 99%   BMI 27.83 kg/m²    Physical Exam   Constitutional: No distress. He is sedated and intubated.   HENT:   Head: Normocephalic and atraumatic.   Eyes: Conjunctivae are normal. No scleral icterus.   Neck: Neck supple. No JVD present.   Cardiovascular: Normal rate and regular rhythm. Exam reveals no gallop and no friction rub.   No murmur heard.  Pulmonary/Chest: Effort normal and breath sounds normal. No stridor. He is intubated. No respiratory distress. He has no wheezes. He has no rales.   Mild vent dyssynchrony   Abdominal: Soft. Bowel sounds are normal. He exhibits no distension and no mass. There is no tenderness. There is no guarding.   Musculoskeletal: He exhibits no edema.   Neurological:   Intubated and sedated    Skin: Skin is warm and dry. He is not diaphoretic. No erythema.   Nursing note and vitals reviewed.          Results Reviewed:  Lab Results (last 24 hours)     Procedure Component Value Units Date/Time    Blood Gas, Arterial With Co-Ox [[id number]]  (Abnormal) Collected:  06/14/19 0539    Specimen:  Arterial Blood Updated:  06/14/19 0540     Site Right Radial     Gera's Test N/A     pH, Arterial 7.467 pH units      Comment: 83 Value above reference range        pCO2, Arterial 27.0 mm Hg      Comment: 84 Value below reference range        pO2, Arterial 135.0 mm Hg      Comment: 83 Value above reference range        HCO3, Arterial 19.5 mmol/L      Comment: 84 Value below reference range        Base Excess, Arterial -2.9 mmol/L      Comment: 84 Value below reference range        O2 Saturation, Arterial 99.3 %      Comment: 83 Value " above reference range        Hemoglobin, Blood Gas 13.3 g/dL      Comment: 84 Value below reference range        Hematocrit, Blood Gas 40.6 %      Oxyhemoglobin 97.7 %      Methemoglobin 1.10 %      Carboxyhemoglobin 0.5 %      A-a Gradiant -- mmHg      Comment: UNABLE TO CALCULATE        Temperature 37.0 C      Sodium, Arterial 141 mmol/L      Potassium, Arterial 3.8 mmol/L      Barometric Pressure for Blood Gas 754 mmHg      Modality Ventilator     FIO2 30 %      Ventilator Mode AC     Set Tidal Volume 600     Set Mech Resp Rate 14.0     PEEP 5.0     Note --     Collected by [id number]     Comment: Meter: [id number]     :  [id number]        pH, Temp Corrected -- pH Units      pCO2, Temperature Corrected -- mm Hg      pO2, Temperature Corrected -- mm Hg     [place] Draw [[id number]] Collected:  06/14/19 0349    Specimen:  Blood Updated:  06/14/19 0500    Narrative:       The following orders were created for panel order [place] Draw.  Procedure                               Abnormality         Status                     ---------                               -----------         ------                     Light Blue Top[[id number]]                                   Final result               Green Top (Gel)[[id number]]                                  Final result               Lavender Top[[id number]]                                     Final result               Red Top[[id number]]                                          Final result                 Please view results for these tests on the individual orders.    Light Blue Top [[id number]] Collected:  06/14/19 0349    Specimen:  Blood Updated:  06/14/19 0500     Extra Tube hold for add-on     Comment: Auto resulted       Green Top (Gel) [[id number]] Collected:  06/14/19 0349    Specimen:  Blood Updated:  06/14/19 0500     Extra Tube Hold for add-ons.     Comment: Auto resulted.       Lavender Top [[id number]] Collected:  06/14/19 0349    Specimen:  Blood  Updated:  06/14/19 0500     Extra Tube hold for add-on     Comment: Auto resulted       Red Top [[id number]] Collected:  06/14/19 0349    Specimen:  Blood Updated:  06/14/19 0500     Extra Tube Hold for add-ons.     Comment: Auto resulted.       CBC & Differential [[id number]] Collected:  06/14/19 0349    Specimen:  Blood Updated:  06/14/19 0420    Narrative:       The following orders were created for panel order CBC & Differential.  Procedure                               Abnormality         Status                     ---------                               -----------         ------                     CBC Auto Differential[[id number]]        Abnormal            Final result                 Please view results for these tests on the individual orders.    CBC Auto Differential [[id number]]  (Abnormal) Collected:  06/14/19 0349    Specimen:  Blood Updated:  06/14/19 0420     WBC 11.21 10*3/mm3      RBC 4.54 10*6/mm3      Hemoglobin 12.9 g/dL      Hematocrit 39.0 %      MCV 85.9 fL      MCH 28.4 pg      MCHC 33.1 g/dL      RDW 15.1 %      RDW-SD 47.5 fl      MPV 10.4 fL      Platelets 274 10*3/mm3      Neutrophil % 71.8 %      Lymphocyte % 18.7 %      Monocyte % 7.7 %      Eosinophil % 0.3 %      Basophil % 0.2 %      Immature Grans % 1.3 %      Neutrophils, Absolute 8.05 10*3/mm3      Lymphocytes, Absolute 2.10 10*3/mm3      Monocytes, Absolute 0.86 10*3/mm3      Eosinophils, Absolute 0.03 10*3/mm3      Basophils, Absolute 0.02 10*3/mm3      Immature Grans, Absolute 0.15 10*3/mm3      nRBC 0.0 /100 WBC     Magnesium [[id number]]  (Abnormal) Collected:  06/14/19 0349    Specimen:  Blood Updated:  06/14/19 0419     Magnesium 2.3 mg/dL     Comprehensive Metabolic Panel [[id number]]  (Abnormal) Collected:  06/14/19 0349    Specimen:  Blood Updated:  06/14/19 0419     Glucose 113 mg/dL      BUN 27 mg/dL      Creatinine 1.70 mg/dL      Sodium 143 mmol/L      Potassium 5.4 mmol/L      Chloride 110 mmol/L      CO2 28.0 mmol/L       Calcium 8.9 mg/dL      Total Protein 6.3 g/dL      Albumin 3.50 g/dL      ALT (SGPT) 35 U/L      AST (SGOT) 39 U/L      Alkaline Phosphatase 64 U/L      Total Bilirubin 0.3 mg/dL      eGFR Non African Amer 42 mL/min/1.73      Globulin 2.8 gm/dL      A/G Ratio 1.3 g/dL      BUN/Creatinine Ratio 15.9     Anion Gap 5.0 mmol/L     Narrative:       GFR Normal >60  Chronic Kidney Disease <60  Kidney Failure <15    Valproic Acid Level, Total [[id number]]  (Normal) Collected:  06/14/19 0349    Specimen:  Blood Updated:  06/14/19 0417     Valproic Acid 84.4 mcg/mL         Imaging Results (last 24 hours)     Procedure Component Value Units Date/Time    XR Chest 1 View [[id number]] Collected:  06/14/19 0705     Updated:  06/14/19 0709    Narrative:       EXAM: XR CHEST 1 VW- - 6/14/2019 4:15 AM CDT     HISTORY: post intubation       COMPARISON: 07/15/2017.      TECHNIQUE:  1 images.  Frontal view of the chest.     FINDINGS:    Endotracheal tube tip projects 1.5 cm from the jolly. No pneumothorax  or large pleural effusion. No dense focal consolidation. Prominent  cardiac silhouette. Upper mediastinum within normal limits. No acute or  suspicious bony findings. Probable old right clavicle fracture.          Impression:       1. Endotracheal tube tip projects 1.5 cm above the jolly.  2. No focal consolidation.  This report was finalized on 06/14/2019 07:06 by Dr Sindy Mota MD.    CT Head Without Contrast [[id number]] Updated:  06/14/19 0502    CT Cervical Spine Without Contrast [[id number]] Updated:  06/14/19 0502        I have personally reviewed and interpreted the radiology studies and ECG obtained at time of admission.     Assessment / Plan     Assessment:   Active Hospital Problems    Diagnosis   • Status epilepticus (CMS/HCC)   • Hyperkalemia   • Acute kidney injury (CMS/HCC) on chronic kidney disease stage 3   • Astrocytoma brain tumor (CMS/HCC)   • Seizures (CMS/HCC)       Plan:   1.  Admit to ICU  2.  Neurology  and neurosurgery consult  3.  IVF with normal saline, bolus then 100 mL/hr  4.  Monitor serial creatinine and potassium   5.  AEDs per neurology  6.  Decreased TV on ventilator to 8mL/kg  7.  Daily ABG and CXR while intubated   8.  Recheck BMP this AM for Cr and Potassium  9.  Check CK   10.  EEG per neurology   11.  Insert OG   12.  Insert ramirez   13.  PPI for GI PPx (take PPI at home), enoxaparin for VTE prophylaxis        Code Status: Full code, mother to make decisions if he is unable     I discussed the patient's findings and my recommendations with the patient and bedside RN April     Estimated length of stay ?     Jairon Webb MD   06/14/19   7:21 AM            Electronically signed by Jairon Webb MD at 6/14/2019  8:00 AM          Emergency Department Notes      Ned Wilson MD at 6/14/2019  4:18 AM      Procedure Orders    1. Intubation [[id number]] ordered by Ned Wilson MD at 06/14/19 0422     2. Critical Care [[id number]] ordered by Ned Wilson MD at 06/14/19 0522                Subjective   Patient with a history of a grade 3 astrocytoma has history of seizures at home he had an episode in which he passed out started seizing may have hit his head EMS got there the patient was very agitated and combative had another episode of seizure was taken vomiting on the Valium on arrival to ER the patient obtunded with a low GCS score airway has been protected discussed this with the family went ahead and intubate the patient        Seizures   Seizure activity on arrival: yes    Seizure type:  Focal  Initial focality:  Facial  Episode characteristics: abnormal movements, combativeness, confusion, focal shaking, incontinence, stiffening, tongue biting and unresponsiveness    Episode characteristics: no apnea, no limpness and fully responsive    Postictal symptoms: confusion    Return to baseline: no    Severity:  Severe  Timing:  Intermittent  Progression:  Worsening  Context: intracranial lesion     Context: not alcohol withdrawal, not cerebral palsy, not change in medication, not sleeping less, not family hx of seizures and not previous head injury    Recent head injury:  No recent head injuries  PTA treatment:  Diazepam  History of seizures: yes        Review of Systems   Unable to perform ROS: Mental status change   Neurological: Positive for seizures.       Past Medical History:   Diagnosis Date   • Angio-edema 7/3/2017   • Anxiety    • Arthritis    • Cancer (CMS/[facility]) 05/09/2019    Brain cancer diagnoised yesterday  Dr Trevino    • Clostridium difficile colitis    • Erectile dysfunction 10/6/2016   • GERD (gastroesophageal reflux disease)    • Gout    • HCAP (healthcare-associated pneumonia) 7/11/2017   • Hyperlipidemia    • Malignant hypertension    • MSSA (methicillin susceptible Staphylococcus aureus) infection 7/31/2017   • Obstructive sleep apnea    • Seizures (CMS/[facility]) 7/4/2017       Allergies   Allergen Reactions   • Lipitor [Atorvastatin] Rash   • Lisinopril Angioedema     Swell tongue       Past Surgical History:   Procedure Laterality Date   • COLONOSCOPY     • CRANIOTOMY Right 4/15/2019    Procedure: CRANIOTOMY WITH BIOPSY RIGHT;  Surgeon: Dillon Trevino MD;  Location:  [facility] OR;  Service: Neurosurgery   • SHOULDER ARTHROSCOPY Left    • TRACHEOSTOMY N/A 7/12/2017    Procedure: TRACHEOSTOMY WITH TRACHEOSCOPY;  Surgeon: Jairon Pierson MD;  Location:  [facility] OR;  Service:        Family History   Problem Relation Age of Onset   • Hypertension Mother    • Diabetes Mother    • Heart disease Father    • Hypertension Father    • No Known Problems Daughter    • No Known Problems Son    • Diabetes Maternal Grandmother    • No Known Problems Maternal Grandfather    • No Known Problems Paternal Grandmother    • Heart attack Paternal Grandfather    • Kidney disease Sister        Social History     Socioeconomic History   • Marital status: Single     Spouse name: Not on file   • Number of children: 2   •  Years of education: Not on file   • Highest education level: Not on file   Occupational History   • Occupation: ELECTRIAL WORK   Tobacco Use   • Smoking status: Former Smoker     Packs/day: 0.33     Years: 30.00     Pack years: 9.90     Types: Cigarettes     Last attempt to quit: 7/3/2017     Years since quittin.9   • Smokeless tobacco: Never Used   Substance and Sexual Activity   • Alcohol use: No     Frequency: Never     Comment: used to drink alot but now just ocasional beer since has seizure in 2017   • Drug use: No   • Sexual activity: Defer           Objective   Physical Exam   Constitutional: He appears well-developed and well-nourished. He appears distressed.   HENT:   Head: Normocephalic and atraumatic.   Eyes: Conjunctivae and EOM are normal. Pupils are equal, round, and reactive to light.   Neck: Normal range of motion. Neck supple.   Cardiovascular: Normal rate, regular rhythm and normal heart sounds.   Pulmonary/Chest: Effort normal and breath sounds normal. Tachypnea noted.   Abdominal: Soft. Bowel sounds are normal.   Musculoskeletal: Normal range of motion.   Neurological: He is alert. He has normal reflexes. He is disoriented. No cranial nerve deficit or sensory deficit. GCS eye subscore is 2. GCS verbal subscore is 2. GCS motor subscore is 2.   Focal twitching left forearm is noted   Skin: Skin is warm and dry.   Psychiatric: He has a normal mood and affect.   Nursing note and vitals reviewed.      Intubation  Date/Time: 2019 4:22 AM  Performed by: Ned Wilson MD  Authorized by: Ned Wilson MD     Consent:     Consent obtained:  Emergent situation    Consent given by:  Parent    Risks discussed:  Aspiration, bleeding, brain injury, death, hypoxia, dental trauma, laryngeal injury and pneumothorax    Alternatives discussed:  Delayed treatment  Pre-procedure details:     Patient status:  Unresponsive    Mallampati score:  III    Pretreatment medications:  Lidocaine    Paralytics:   Succinylcholine  Procedure details:     Preoxygenation:  Bag valve mask    CPR in progress: no      Intubation method:  Oral    Oral intubation technique:  Video-assisted    Laryngoscope blade:  Ward 4    Tube size (mm):  7.5    Tube type:  Cuffed    Number of attempts:  1    Ventilation between attempts: no      Cricoid pressure: no      Tube visualized through cords: yes    Placement assessment:     Tube secured with:  Adhesive tape    Breath sounds:  Equal    Placement verification: chest rise, condensation, CXR verification, direct visualization and equal breath sounds    Post-procedure details:     Patient tolerance of procedure:  Tolerated well, no immediate complications    Critical Care  Performed by: Ned Wilson MD  Authorized by: Ned Wilson MD     Critical care provider statement:     Critical care time (minutes):  45    Critical care time was exclusive of:  Separately billable procedures and treating other patients    Critical care was necessary to treat or prevent imminent or life-threatening deterioration of the following conditions:  CNS failure or compromise    Critical care was time spent personally by me on the following activities:  Blood draw for specimens, discussions with consultants, discussions with primary provider, examination of patient, review of old charts, re-evaluation of patient's condition, obtaining history from patient or surrogate, ordering and review of laboratory studies, ordering and performing treatments and interventions, ordering and review of radiographic studies, pulse oximetry, development of treatment plan with patient or surrogate and evaluation of patient's response to treatment              ED Course  ED Course as of Jun 14 0555 Fri Jun 14, 2019   0422 Side and intubated.  And was given Cerebyx.  He was still having some seizure activity after that was placed on the prevent I have discussed this case with Dr. Crowe and also with Dr. Gonzalez with  "consultation  [TS]      ED Course User Index  [TS] Ned Wilson MD                  [place]      Final diagnoses:   Status epilepticus (CMS/HCC)   Astrocytoma brain tumor (CMS/HCC)            Ned Wilson MD  06/14/19 0555      Electronically signed by Ned Wilson MD at 6/14/2019  5:55 AM       ICU Vital Signs     Row Name 06/14/19 0710 06/14/19 0700 06/14/19 0635 06/14/19 0616 06/14/19 0601       Vitals    Temp  --  --  97.6 °F (36.4 °C)  96.6 °F (35.9 °C)  --    Temp src  --  --  Rectal  Rectal  --    Pulse  --  --  73  68  72    Heart Rate Source  --  --  Monitor  --  --    Resp  --  --  23  15 a/c vent  --    Resp Rate Source  --  --  Monitor  --  --    Resp Rate (Observed) Vent  --  17  --  --  --    BP  --  --  124/94  106/73  93/64    BP Location  --  --  Left arm  --  --    BP Method  --  --  Automatic  --  --    Patient Position  --  --  Lying  --  --       Oxygen Therapy    SpO2  --  --  99 %  100 %  100 %    Pulse Oximetry Type  --  --  Continuous  --  --    Device (Oxygen Therapy)  ventilator  --  ventilator  --  --    Oxygen Concentration (%)  30  --  --  --  --    Row Name 06/14/19 0546 06/14/19 0532 06/14/19 0516 06/14/19 0507 06/14/19 0432       Vitals    Pulse  69  68  72  70  65    BP  106/75  108/76  92/63  97/67  138/93       Oxygen Therapy    SpO2  100 %  100 %  100 %  100 %  100 %    Row Name 06/14/19 0431 06/14/19 0417 06/14/19 0416 06/14/19 0406 06/14/19 0401       Vitals    Pulse  62  68  71  62  70    Heart Rate Source  --  --  --  Monitor  --    Resp  --  --  --  14  --    Resp Rate Source  --  --  --  Ventilator  --    Resp Rate (Observed) Vent  --  --  --  24  --    BP  --  --  144/97  --  155/99       Oxygen Therapy    SpO2  100 %  100 %  100 %  100 %  96 %    Device (Oxygen Therapy)  --  --  --  ventilator  --    Oxygen Concentration (%)  --  --  --  30  --    Row Name 06/14/19 0357 06/14/19 0347 06/14/19 0340             Height and Weight    Height  --  --  172.7 cm (68\")      " "Weight  --  --  83 kg (183 lb)      Ideal Body Weight (IBW) (kg)  --  --  70.89      BSA (Calculated - sq m)  --  --  1.97 sq meters      BMI (Calculated)  --  --  27.8      Weight in (lb) to have BMI = 25  --  --  164.1         Vitals    Temp  --  --  97.7 °F (36.5 °C)      Pulse  85  70  62      Resp  --  --  16      BP  116/61  116/81  117/71         Oxygen Therapy    SpO2  --  100 %  100 %          Hospital Medications (all)       Dose Frequency Start End    acetaminophen (TYLENOL) suppository 650 mg 650 mg Every 4 Hours PRN 6/14/2019     Sig - Route: Insert 1 suppository into the rectum Every 4 (Four) Hours As Needed for Fever (temperature greater than 101.5 F or headache). - Rectal    Linked Group 1:  \"Or\" Linked Group Details        acetaminophen (TYLENOL) tablet 650 mg 650 mg Every 4 Hours PRN 6/14/2019     Sig - Route: Take 2 tablets by mouth Every 4 (Four) Hours As Needed for Headache or Fever (fever greater than 101.5 F). - Oral    Linked Group 1:  \"Or\" Linked Group Details        chlorhexidine (PERIDEX) 0.12 % solution 15 mL 15 mL Every 12 Hours Scheduled 6/14/2019     Sig - Route: Apply 15 mL to the mouth or throat Every 12 (Twelve) Hours. - Mouth/Throat    enoxaparin (LOVENOX) syringe 40 mg 40 mg Every 24 Hours 6/14/2019     Sig - Route: Inject 0.4 mL under the skin into the appropriate area as directed Daily. - Subcutaneous    etomidate (AMIDATE) injection 15 mg 15 mg Once 6/14/2019 6/14/2019    Sig - Route: Infuse 7.5 mL into a venous catheter 1 (One) Time. - Intravenous    fosphenytoin (Cerebyx) 1,660 mg PE in sodium chloride 0.9 % 100 mL IVPB 20 mg PE/kg × 83 kg Once 6/14/2019 6/14/2019    Sig - Route: Infuse 1,660 mg PE into a venous catheter 1 (One) Time. - Intravenous    Cosign for Ordering: Accepted by Ned Wilson MD on 6/14/2019  6:21 AM    lidocaine (cardiac) (XYLOCAINE) injection 100 mg 100 mg Once 6/14/2019 6/14/2019    Sig - Route: Infuse 5 mL into a venous catheter 1 (One) Time. - " Intravenous    LORazepam (ATIVAN) injection 1 mg 1 mg Every 5 Minutes PRN 6/14/2019 6/24/2019    Sig - Route: Infuse 0.5 mL into a venous catheter Every 5 (Five) Minutes As Needed for Seizures. - Intravenous    ondansetron (ZOFRAN) injection 4 mg 4 mg Every 6 Hours PRN 6/14/2019     Sig - Route: Infuse 2 mL into a venous catheter Every 6 (Six) Hours As Needed for Nausea or Vomiting. - Intravenous    pantoprazole (PROTONIX) injection 40 mg 40 mg Every Early Morning 6/14/2019     Sig - Route: Infuse 10 mL into a venous catheter Every Morning. - Intravenous    propofol (DIPRIVAN) infusion 10 mg/mL 100 mL 5-50 mcg/kg/min × 83 kg Titrated 6/14/2019     Sig - Route: Infuse 415-4,150 mcg/min into a venous catheter Dose Adjusted By Provider As Needed. - Intravenous    sodium chloride 0.9 % bolus 500 mL 500 mL Once 6/14/2019 6/14/2019    Sig - Route: Infuse 500 mL into a venous catheter 1 (One) Time. - Intravenous    sodium chloride 0.9 % flush 3 mL 3 mL Every 12 Hours Scheduled 6/14/2019     Sig - Route: Infuse 3 mL into a venous catheter Every 12 (Twelve) Hours. - Intravenous    sodium chloride 0.9 % flush 3-10 mL 3-10 mL As Needed 6/14/2019     Sig - Route: Infuse 3-10 mL into a venous catheter As Needed for Line Care. - Intravenous    sodium chloride 0.9 % infusion 100 mL/hr Continuous 6/14/2019     Sig - Route: Infuse 100 mL/hr into a venous catheter Continuous. - Intravenous    succinylcholine (ANECTINE) injection 200 mg 200 mg Once 6/14/2019 6/14/2019    Sig - Route: Infuse 10 mL into a venous catheter 1 (One) Time. - Intravenous    famotidine (PEPCID) injection 20 mg (Discontinued) 20 mg Every 12 Hours Scheduled 6/14/2019 6/14/2019    Sig - Route: Infuse 2 mL into a venous catheter Every 12 (Twelve) Hours. - Intravenous            Lab Results (last 24 hours)     Procedure Component Value Units Date/Time    CK [[id number]]  (Normal) Collected:  06/14/19 0349    Specimen:  Blood Updated:  06/14/19 0804     Creatine  Kinase 139 U/L     Phenytoin Level, Total [[id number]]  (Normal) Collected:  06/14/19 0724    Specimen:  Blood Updated:  06/14/19 0750     Phenytoin Level 16.4 mcg/mL     Levetiracetam Level (Keppra) [[id number]] Collected:  06/14/19 0724    Specimen:  Blood Updated:  06/14/19 0731    Blood Gas, Arterial With Co-Ox [[id number]]  (Abnormal) Collected:  06/14/19 0539    Specimen:  Arterial Blood Updated:  06/14/19 0540     Site Right Radial     Gera's Test N/A     pH, Arterial 7.467 pH units      Comment: 83 Value above reference range        pCO2, Arterial 27.0 mm Hg      Comment: 84 Value below reference range        pO2, Arterial 135.0 mm Hg      Comment: 83 Value above reference range        HCO3, Arterial 19.5 mmol/L      Comment: 84 Value below reference range        Base Excess, Arterial -2.9 mmol/L      Comment: 84 Value below reference range        O2 Saturation, Arterial 99.3 %      Comment: 83 Value above reference range        Hemoglobin, Blood Gas 13.3 g/dL      Comment: 84 Value below reference range        Hematocrit, Blood Gas 40.6 %      Oxyhemoglobin 97.7 %      Methemoglobin 1.10 %      Carboxyhemoglobin 0.5 %      A-a Gradiant -- mmHg      Comment: UNABLE TO CALCULATE        Temperature 37.0 C      Sodium, Arterial 141 mmol/L      Potassium, Arterial 3.8 mmol/L      Barometric Pressure for Blood Gas 754 mmHg      Modality Ventilator     FIO2 30 %      Ventilator Mode AC     Set Tidal Volume 600     Set Mech Resp Rate 14.0     PEEP 5.0     Note --     Collected by [id number]     Comment: Meter: [id number]     :  [id number]        pH, Temp Corrected -- pH Units      pCO2, Temperature Corrected -- mm Hg      pO2, Temperature Corrected -- mm Hg     [place] Draw [[id number]] Collected:  06/14/19 0349    Specimen:  Blood Updated:  06/14/19 0500    Narrative:       The following orders were created for panel order [place] Draw.  Procedure                               Abnormality         Status                      ---------                               -----------         ------                     Light Blue Top[[id number]]                                   Final result               Green Top (Gel)[[id number]]                                  Final result               Lavender Top[[id number]]                                     Final result               Red Top[[id number]]                                          Final result                 Please view results for these tests on the individual orders.    Light Blue Top [[id number]] Collected:  06/14/19 0349    Specimen:  Blood Updated:  06/14/19 0500     Extra Tube hold for add-on     Comment: Auto resulted       Green Top (Gel) [[id number]] Collected:  06/14/19 0349    Specimen:  Blood Updated:  06/14/19 0500     Extra Tube Hold for add-ons.     Comment: Auto resulted.       Lavender Top [[id number]] Collected:  06/14/19 0349    Specimen:  Blood Updated:  06/14/19 0500     Extra Tube hold for add-on     Comment: Auto resulted       Red Top [[id number]] Collected:  06/14/19 0349    Specimen:  Blood Updated:  06/14/19 0500     Extra Tube Hold for add-ons.     Comment: Auto resulted.       CBC & Differential [[id number]] Collected:  06/14/19 0349    Specimen:  Blood Updated:  06/14/19 0420    Narrative:       The following orders were created for panel order CBC & Differential.  Procedure                               Abnormality         Status                     ---------                               -----------         ------                     CBC Auto Differential[[id number]]        Abnormal            Final result                 Please view results for these tests on the individual orders.    CBC Auto Differential [[id number]]  (Abnormal) Collected:  06/14/19 0349    Specimen:  Blood Updated:  06/14/19 0420     WBC 11.21 10*3/mm3      RBC 4.54 10*6/mm3      Hemoglobin 12.9 g/dL      Hematocrit 39.0 %      MCV 85.9 fL      MCH 28.4 pg      MCHC 33.1 g/dL       RDW 15.1 %      RDW-SD 47.5 fl      MPV 10.4 fL      Platelets 274 10*3/mm3      Neutrophil % 71.8 %      Lymphocyte % 18.7 %      Monocyte % 7.7 %      Eosinophil % 0.3 %      Basophil % 0.2 %      Immature Grans % 1.3 %      Neutrophils, Absolute 8.05 10*3/mm3      Lymphocytes, Absolute 2.10 10*3/mm3      Monocytes, Absolute 0.86 10*3/mm3      Eosinophils, Absolute 0.03 10*3/mm3      Basophils, Absolute 0.02 10*3/mm3      Immature Grans, Absolute 0.15 10*3/mm3      nRBC 0.0 /100 WBC     Magnesium [[id number]]  (Abnormal) Collected:  06/14/19 0349    Specimen:  Blood Updated:  06/14/19 0419     Magnesium 2.3 mg/dL     Comprehensive Metabolic Panel [[id number]]  (Abnormal) Collected:  06/14/19 0349    Specimen:  Blood Updated:  06/14/19 0419     Glucose 113 mg/dL      BUN 27 mg/dL      Creatinine 1.70 mg/dL      Sodium 143 mmol/L      Potassium 5.4 mmol/L      Chloride 110 mmol/L      CO2 28.0 mmol/L      Calcium 8.9 mg/dL      Total Protein 6.3 g/dL      Albumin 3.50 g/dL      ALT (SGPT) 35 U/L      AST (SGOT) 39 U/L      Alkaline Phosphatase 64 U/L      Total Bilirubin 0.3 mg/dL      eGFR Non African Amer 42 mL/min/1.73      Globulin 2.8 gm/dL      A/G Ratio 1.3 g/dL      BUN/Creatinine Ratio 15.9     Anion Gap 5.0 mmol/L     Narrative:       GFR Normal >60  Chronic Kidney Disease <60  Kidney Failure <15    Valproic Acid Level, Total [[id number]]  (Normal) Collected:  06/14/19 0349    Specimen:  Blood Updated:  06/14/19 0417     Valproic Acid 84.4 mcg/mL         Imaging Results (last 24 hours)     Procedure Component Value Units Date/Time    CT Cervical Spine Without Contrast [[id number]] Collected:  06/14/19 0809     Updated:  06/14/19 0814    Narrative:       EXAMINATION:  CT CERVICAL SPINE WO CONTRAST-  6/14/2019 4:46 AM CDT     HISTORY: fall, seizure      COMPARISON: No comparison study.     TECHNIQUE: Radiation dose equals  mGy-cm.  Automated exposure  control dose reduction technique was implemented.      Thin section axial imaging was obtained without intravenous contrast.  2-D sagittal and coronal reconstruction images were generated.     FINDINGS: Examination was sent to [facility] for preliminary  interpretation.     The facets are appropriately aligned.     The vertebral bodies are maintained. Prevertebral soft tissues are  normal without acute fracture or subluxation.     There is multilevel disc degeneration with disc space narrowing endplate  irregularities and osteophyte formation C6-C7, C5-C6, C4-C5 and to a  lesser degree C3-C4.     There is mild posterior listhesis CT for on C5 and mild anterolisthesis  of C3 on C4.     There is relative canal foraminal narrowing at multiple levels  particularly at C5-C6.     There is no CT evidence of posttraumatic disc herniation.       Impression:       1. No CT evidence of acute bony injury to the cervical spine.  2. Multilevel disc degeneration spondylosis.  This report was finalized on 06/14/2019 08:11 by Dr. Pierre Pan MD.    CT Head Without Contrast [[id number]] Collected:  06/14/19 0755     Updated:  06/14/19 0812    Narrative:       EXAMINATION: CT HEAD WO CONTRAST-  6/14/2019 7:55 AM CDT     CT SCAN OF THE HEAD, WITHOUT CONTRAST:      HISTORY: Seizures, history of brain tumor previous right-sided frontal  craniotomy.     COMPARISONS: 06/03/2019 head CT      TECHNIQUE:     Radiation dose equals  mGy-cm.  Automated exposure control dose  reduction technique was implemented.        CT evaluation of the head without intravenous contrast. 5 mm transaxial  images were obtained.   2-D sagittal and coronal reconstruction images  were generated.     FINDINGS: Examination was sent to [facility] for preliminary  interpretation.     Changes from right craniotomy observed.     The right sided high density lesion is again observed superiorly in the  right frontal lobe, near the midline, extending from the corpus callosum  was noted previously and is essentially  unchanged. Second probable high  density lesion observed more laterally in the right frontal lobe near  the craniotomy defect. The CT appearance is most likely unchanged.     There is no mass effect or midline shift.     There is no hydrocephalus.     There is no developing hemorrhage.     Mucosal thickening observed in the ethmoid sinuses.             Impression:       1. Stable CT appearance of the head compared to 06/03/2019, as described  above.                              This report was finalized on 06/14/2019 08:08 by Dr. Pierre Pan MD.    XR Chest 1 View [[id number]] Collected:  06/14/19 0705     Updated:  06/14/19 0709    Narrative:       EXAM: XR CHEST 1 VW- - 6/14/2019 4:15 AM CDT     HISTORY: post intubation       COMPARISON: 07/15/2017.      TECHNIQUE:  1 images.  Frontal view of the chest.     FINDINGS:    Endotracheal tube tip projects 1.5 cm from the jolly. No pneumothorax  or large pleural effusion. No dense focal consolidation. Prominent  cardiac silhouette. Upper mediastinum within normal limits. No acute or  suspicious bony findings. Probable old right clavicle fracture.          Impression:       1. Endotracheal tube tip projects 1.5 cm above the jolly.  2. No focal consolidation.  This report was finalized on 06/14/2019 07:06 by Dr Sindy Mota MD.        ECG/EMG Results (last 24 hours)     ** No results found for the last 24 hours. **        Orders (last 24 hrs)     Start     Ordered    06/15/19 0600  Valproic Acid Level, Total  Morning Draw      06/14/19 0655    06/15/19 0600  CBC Auto Differential  Morning Draw      06/14/19 0703    06/15/19 0600  Comprehensive Metabolic Panel  Morning Draw      06/14/19 0703    06/15/19 0600  Blood Gas, Arterial  Morning Draw      06/14/19 0753    06/15/19 0600  XR Chest 1 View  Daily      06/14/19 0753    06/14/19 0900  sodium chloride 0.9 % flush 3 mL  Every 12 Hours Scheduled      06/14/19 0703    06/14/19 0900  chlorhexidine (PERIDEX) 0.12 %  solution 15 mL  Every 12 Hours Scheduled      06/14/19 0703    06/14/19 0900  famotidine (PEPCID) injection 20 mg  Every 12 Hours Scheduled,   Status:  Discontinued      06/14/19 0703    06/14/19 0845  pantoprazole (PROTONIX) injection 40 mg  Every Early Morning      06/14/19 0754    06/14/19 0845  enoxaparin (LOVENOX) syringe 40 mg  Every 24 Hours      06/14/19 0758    06/14/19 0815  sodium chloride 0.9 % bolus 500 mL  Once      06/14/19 0726    06/14/19 0815  sodium chloride 0.9 % infusion  Continuous      06/14/19 0726    06/14/19 0800  Oral Care & Teeth Brushing  Every 4 Hours     Comments:  Pala Teeth at Least 2x/day    06/14/19 0703 06/14/19 0757  Insert Indwelling Urinary Catheter  Once      06/14/19 0756 06/14/19 0757  Assess Need for Indwelling Urinary Catheter - Follow Removal Protocol  Continuous     Comments:  Indwelling Urinary Catheter Removal Criteria  Discontinue Indwelling Urinary Catheter Unless One of the Following is Present  Urinary Retention or Obstruction  Chronic Padron Catheter Use  End of Life  Critical Illness with Strict I/O   Tract or Abdominal Surgery  Stage 3/4 Sacral / Perineal Wound  Required Activity Restriction: Trauma  Required Activity Restriction: Spine Surgery  If Patient is Being Followed by Urology Contact Them PRIOR to Removal  Do Not Remove Indwelling Urinary Catheter Order is Present with a CLINICAL REASON to Maintain the Catheter. Provider is Required to Include a Clinical Reason to Maintain a Urinary Catheter    Chronic Padron Catheter Use (Present on Admission)  Assess for Continued Need & Document Medical Necessity  If Infection is Suspected, Contact the Provider        06/14/19 0756 06/14/19 0757  Catheter Care  Every Shift      06/14/19 0756 06/14/19 0757  Nasogastric Tube Insertion  Once     Comments:  INSERT OG    06/14/19 0756    06/14/19 0756  Ventilator - AC/VC; (14); 30; 92%; 5; 540; 8  Continuous      06/14/19 0755    06/14/19 0755  Seizure  Precautions  Continuous      06/14/19 0754    06/14/19 0754  LORazepam (ATIVAN) injection 1 mg  Every 5 Minutes PRN      06/14/19 0754    06/14/19 0753  Lactic Acid, Plasma  Once      06/14/19 0753    06/14/19 0752  Basic Metabolic Panel  Once      06/14/19 0753    06/14/19 0752  CK  Once      06/14/19 0753    06/14/19 0704  Daily Weights  Daily      06/14/19 0703    06/14/19 0704  Spontaneous Awakening Trial  Daily      06/14/19 0703    06/14/19 0701  Seizure Precautions  Continuous      06/14/19 0703    06/14/19 0701  Elevate HOB  Continuous      06/14/19 0703    06/14/19 0701  NPO Diet  Diet Effective Now      06/14/19 0703    06/14/19 0701  Inpatient Consult to Nutrition Services  Once     Provider:  (Not yet assigned)    06/14/19 0703    06/14/19 0701  Blood Gas, Arterial  Once     Comments:  One Hour After Intubation      06/14/19 0703    06/14/19 0701  RN May Place Order For ABG As Needed for Respiratory Distress  Continuous      06/14/19 0703    06/14/19 0701  XR Chest 1 View  1 Time Imaging      06/14/19 0703    06/14/19 0701  Place Sequential Compression Device  Once      06/14/19 0703    06/14/19 0701  Maintain Sequential Compression Device  Continuous      06/14/19 0703    06/14/19 0700  ondansetron (ZOFRAN) injection 4 mg  Every 6 Hours PRN      06/14/19 0703    06/14/19 0700  Code Status and Medical Interventions:  Continuous      06/14/19 0703    06/14/19 0700  Vital Signs Every Hour and Per Hospital Policy Based on Patient Condition  Every Hour      06/14/19 0703    06/14/19 0700  Cardiac Monitoring  Continuous      06/14/19 0703    06/14/19 0700  Continuous Pulse Oximetry  Continuous      06/14/19 0703    06/14/19 0700  Strict Bed Rest  Until Discontinued      06/14/19 0703    06/14/19 0700  Use Mobility Guidelines for Advancement of Activity  Continuous      06/14/19 0703    06/14/19 0700  Height & Weight  Once      06/14/19 0703    06/14/19 0700  Intake and Output  Every Hour      06/14/19 9289     06/14/19 0700  Insert Peripheral IV  Once      06/14/19 0703    06/14/19 0700  Saline Lock & Maintain IV Access  Continuous      06/14/19 0703    06/14/19 0700  Inpatient Neurology Consult General  Once     Specialty:  Neurology  Provider:  Doreen Zuniga MD    06/14/19 0703    06/14/19 0700  Inpatient Neurosurgery Consult  Once     Specialty:  Neurosurgery  Provider:  Delio Crowe MD    06/14/19 0703    06/14/19 0659  acetaminophen (TYLENOL) tablet 650 mg  Every 4 Hours PRN      06/14/19 0703    06/14/19 0659  acetaminophen (TYLENOL) suppository 650 mg  Every 4 Hours PRN      06/14/19 0703    06/14/19 0659  sodium chloride 0.9 % flush 3-10 mL  As Needed      06/14/19 0703    06/14/19 0656  Phenytoin Level, Total  Once      06/14/19 0655    06/14/19 0656  EEG  Once     Comments:  One hour exam on and off propofol    06/14/19 0656    06/14/19 0655  Levetiracetam Level (Keppra)  Once      06/14/19 0655    06/14/19 0556  EEG  Once,   Status:  Canceled      06/14/19 0555    06/14/19 0555  Inpatient Neurology Consult General  Once     Specialty:  Neurology  Provider:  Doreen Zuniga MD    06/14/19 0555    06/14/19 0554  Inpatient Admission  Once      06/14/19 0555    06/14/19 0554  Neurosurgery (on-call MD unless specified)  Once     Specialty:  Neurosurgery  Provider:  Delio Crowe MD    06/14/19 0555    06/14/19 0541  Blood Gas, Arterial With Co-Ox  Once      06/14/19 0539    06/14/19 0523  Critical Care  Once     Comments:  This order was created via procedure documentation    06/14/19 0522    06/14/19 0517  XR Abdomen KUB  1 Time Imaging,   Status:  Canceled      06/14/19 0517    06/14/19 0423  Intubation  Once     Comments:  This order was created via procedure documentation    06/14/19 0422    06/14/19 0420  CBC Auto Differential  Once      06/14/19 0419    06/14/19 0419  Ventilator - AC/VC; (14); 30; 92%; 5; 600  Continuous,   Status:  Canceled      06/14/19 0418    06/14/19  0406  Magnesium  Once      06/14/19 0406    06/14/19 0400  lidocaine (cardiac) (XYLOCAINE) injection 100 mg  Once      06/14/19 0358    06/14/19 0400  succinylcholine (ANECTINE) injection 200 mg  Once      06/14/19 0358    06/14/19 0400  etomidate (AMIDATE) injection 15 mg  Once      06/14/19 0358    06/14/19 0356  Comprehensive Metabolic Panel  STAT      06/14/19 0357    06/14/19 0356  Valproic Acid Level, Total  STAT      06/14/19 0357    06/14/19 0356  Blood Gas, Arterial With Co-Ox  STAT      06/14/19 0357    06/14/19 0356  XR Chest 1 View  1 Time Imaging      06/14/19 0357    06/14/19 0356  CT Cervical Spine Without Contrast  1 Time Imaging      06/14/19 0357    06/14/19 0354  propofol (DIPRIVAN) infusion 10 mg/mL 100 mL  Titrated      06/14/19 0352    06/14/19 0354  CBC & Differential  STAT      06/14/19 0357    06/14/19 0350  fosphenytoin (Cerebyx) 1,660 mg PE in sodium chloride 0.9 % 100 mL IVPB  Once      06/14/19 0348    06/14/19 0349  [place] Draw  STAT      06/14/19 0349    06/14/19 0349  Light Blue Top  PROCEDURE ONCE      06/14/19 0349    06/14/19 0349  Green Top (Gel)  PROCEDURE ONCE      06/14/19 0349    06/14/19 0349  Lavender Top  PROCEDURE ONCE      06/14/19 0349    06/14/19 0349  Red Top  PROCEDURE ONCE      06/14/19 0349    06/14/19 0348  CT Head Without Contrast  1 Time Imaging      06/14/19 0348    Unscheduled  Subglottic Suctioning Must Be Done Every 6 Hours  As Needed      06/14/19 0703    Unscheduled  Blood Gas, Arterial  As Needed      06/14/19 0753          Ventilator/Non-Invasive Ventilation Settings (From admission, onward)    Start     Ordered    06/14/19 0756  Ventilator - AC/VC; (14); 30; 92%; 5; 540; 8  Continuous     Question Answer Comment   Vent Mode AC/VC    Breath rate  14   FiO2 30    FiO2 titrate for Sp02% =/> 92%    PEEP 5    Tidal Volume 540    Tidal Volume ML/Kg 8        06/14/19 0755    06/14/19 0419  Ventilator - AC/VC; (14); 30; 92%; 5; 600  Continuous,   Status:   Canceled     Question Answer Comment   Vent Mode AC/VC    Breath rate  14   FiO2 30    FiO2 titrate for Sp02% =/> 92%    PEEP 5    Tidal Volume 600        06/14/19 0419          Physician Progress Notes (last 24 hours) (Notes from 6/13/2019  8:17 AM through 6/14/2019  8:17 AM)     No notes of this type exist for this encounter.        Consult Notes (last 24 hours) (Notes from 6/13/2019  8:17 AM through 6/14/2019  8:17 AM)     No notes of this type exist for this encounter.

## 2019-06-14 NOTE — OUTREACH NOTE
Medical Week 2 Survey      Responses   Facility patient discharged from?  [facility]   Does the patient have one of the following disease processes/diagnoses(primary or secondary)?  Other   Week 2 attempt successful?  No   Revoke  Readmitted          Angy Russell RN

## 2019-06-14 NOTE — CONSULTS
"    Neurology Consult Note    Patient:  Lukasz Savage   YOB: 1965  MRN:  [id number]  Date of Admission:  6/14/2019  3:46 AM    Date: 6/14/2019    Referring Provider: Robe Dorsey DO  Reason for Consultation: Seizure      History of present illness:     This is a 54 y.o. 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     Per daughter patient was at home and throwing up last night and did so after taking his meds. However his Depakote level was in therapeutic range at 84.4      Per daughter while at home he was \"talking and not making sense\"   He  had lost of consciousness and began seizing and may have hit his head. When EMS arrived patient was  post ictal, combative and agitated.  Then had another seizure en route and again in the ED. He was given IV Fosphenytoin but continued to seize and then  was intubated in ER and placed on propofol drip.    Currently at home  patient is taking Keppra 1500 mg BID and Depakote 1000 mg BID and Vimpat 100 mg BID.  At the time of his last discharge he went a day or so  without Vimpat due to initial  insurance denial of Vimpat but ultimately he was approved.       Background:  Patient first began having seizures in 7/3/2017 when he was admitted for PRES due to hypertension and noncompliance with his medications He went through alcohol withdrawal/DT's during that hospitalization. His daughter at that time reported he drank a case of beer a day. He was started on Keppra and then weaned off as it was assumed the seizures were due to alcohol withdrawal.   He was seizure free until February 2019 when he  when he developed left facial with left upper extremity twitching and restarted on Keppra but he also noted speech difficulties and concentration problems. He was found to have an abnormal MRI in April 2019 with a right frontal lobe lesion as " well as a second lesion subcortical white matter of the paramedian posterior right frontal lobe and underwent a right frontal stereotactic brain biopsy on April 15 2019 with pathology showing grade 3 anaplastic astrocytoma.     He has been followed in Neurology by Galen Todd PA-C and he was placed back on Keppra increased to 1500 mg BID as seizures continued   He was placed on Trokendi but unclear why this was not continued and it is my understanding insurance would not allow this. He was then given samples of Aptiom 800 mg and then increased to 1600 mg but apparently he never took this.  Insurance denied this too.  His  seizures continued and then he was started on Depakote 500 mg BID and increased on 5/31/2019 to 1000 mg BID Wife complained patient slept most of the day and yet still had seizures of left facial twitching and left arm twitching.        Patient had a recent admission on 6/3/2019 for left arm weakness and possible seizures. Prior to that hospitalization he was on Keppra and Valproate and had a Valproate level of 73 and Keppra level of 30.5 (but this was after IV bolus of Keppra) and while on 1000 mg Bid.     He noticed  for 3 days prior to his June 3rd 2019 admission  his left arm was weak and he woke up  with left arm weakness. He was having twitching of his left face, neck muscles and arm up to 6 to 9 times a day and lasting over 5 minutes at times.  During this hospitalization he was started on dexamethasone and  Vimpat and symptoms remarkably improved.  However once again insurance denied Vimpat but he continued on dexamethasone.  .       Patient has had 4 EEGs since last July:   1. EEG 6/4/2019  Interpretation:  This is an abnormal EEG recording secondary to slowing of the background rhythm which may be seen as the result of a diffuse cerebral disturbance and is nonspecific but may be consistent with a metabolic or medication related encephalopathy. There was no definite focal slowing to  "suggest focal post ictal event but a generalized post ictal event cannot be excluded.   No definitive seizure activity was noted.  Clinical correlation is recommended.    2.. EEG 3/27/19:  Interpretation:  This is a normal awake  EEG     3. . EEG 10/3/2017:  Findings: Normal awake EEG    4. EEG 7/10/2017  This is an abnormal EEG recording secondary to slowing of the background rhythm.  This could be consistent with a metabolic or medication related encephalopathy.  No definitive seizure activity was noted.  Clinical correlation is recommended.      His last MRI of the brain with and without contrast 6/4/2019  1. Increased size of right frontal lobe mass lesions compared to 04/02/2019  \"FINDINGS:  There is a mass like appearance at the right frontal lobe, parafalcine location measuring about 2.9 x 1.4 cm, previously 1.7 x 1.1 cm, with mass effect and possible extension into the corpus callosum. The aforementioned mass does efface the adjacent sulci, but there is no significant midline shift. The second previously described larger area has indistinct borders and is difficult to measure, appearing similar to  slightly increased in size and involves the right motor cortex. There is associated faint enhancement and mild restricted diffusion. The mass lesions do not appear to cross midline. There is some questionable  precontrast T1 hyperintensity such as axial series serial one image 1 22-99. This could represent hemorrhage/necrosis, postbiopsy/posttreatment changes or possibly artifact. On T2 star series, the biopsy tract is visualized\"      Past Medical History:   Diagnosis Date   • Angio-edema 7/3/2017   • Anxiety    • Arthritis    • Cancer (CMS/HCC) 05/09/2019    Brain cancer diagnoised yesterday  Dr Trevino    • Clostridium difficile colitis    • Erectile dysfunction 10/6/2016   • GERD (gastroesophageal reflux disease)    • Gout    • HCAP (healthcare-associated pneumonia) 7/11/2017   • Hyperlipidemia    • Malignant " hypertension    • MSSA (methicillin susceptible Staphylococcus aureus) infection 7/31/2017   • Obstructive sleep apnea    • Seizures (CMS/HCC) 7/4/2017       Past Surgical History:   Procedure Laterality Date   • COLONOSCOPY     • CRANIOTOMY Right 4/15/2019    Procedure: CRANIOTOMY WITH BIOPSY RIGHT;  Surgeon: Dillon Trevino MD;  Location: [facility] OR;  Service: Neurosurgery   • SHOULDER ARTHROSCOPY Left    • TRACHEOSTOMY N/A 7/12/2017    Procedure: TRACHEOSTOMY WITH TRACHEOSCOPY;  Surgeon: Jairon Pierson MD;  Location:  [facility] OR;  Service:        Prior to Admission medications    Medication Sig Start Date End Date Taking? Authorizing Provider   allopurinol (ZYLOPRIM) 300 MG tablet Take 300 mg by mouth Daily.    Provider, MD Phil   ALPRAZolam (XANAX) 0.25 MG tablet Take 1 tablet by mouth 2 (Two) Times a Day As Needed for Anxiety.  Patient taking differently: Take 0.25 mg by mouth 3 (Three) Times a Day As Needed for Anxiety. 5/20/19   Linda Hoffman DNP, APRN   aspirin 81 MG tablet Take 1 tablet by mouth Daily. 2/28/19   Royal Todd PA   carvedilol (COREG) 25 MG tablet Take 1 tablet by mouth 2 (Two) Times a Day With Meals. 5/20/19   Linda Hoffman DNP, APRN   CloNIDine (CATAPRES) 0.1 MG tablet Take 1 tablet by mouth Every Night. Check BP 4x daily. If> 180 SBP take x1.  Patient taking differently: Take 0.1 mg by mouth 2 (Two) Times a Day As Needed for High Blood Pressure. Check BP 4x daily. If> 180 SBP take x1. 5/20/19   Linda Hoffman DNP, APRN   colchicine 0.6 MG tablet Take 2 tabs now, repeat 1 tab in an hour.  May repeat same steps again in 24 hours if needed.  He has chronic gout  Patient taking differently: Take 0.6 mg by mouth Daily As Needed. Take 2 tabs now, repeat 1 tab in an hour.  May repeat same steps again in 24 hours if needed.  He has chronic gout 11/20/18   Linda Hoffman DNP, APRN   cyclobenzaprine (FLEXERIL) 10 MG tablet Take 1 tablet by mouth 3 (Three)  Times a Day As Needed for Muscle Spasms. 5/17/19   Oral Jessica III, MD   dexamethasone (DECADRON) 1 MG tablet Take 1 mg by mouth 4 (Four) Times a Day. 5/1/19   Phil Constantino MD   divalproex (DEPAKOTE) 500 MG DR tablet Take 2 tablets by mouth 2 (Two) Times a Day. 5/31/19   Royal Todd PA   divalproex (DEPAKOTE) 500 MG DR tablet Take 2 tablets by mouth 2 (Two) Times a Day. 6/6/19   Tommie Thornton APRN   fenofibrate (TRICOR) 145 MG tablet Take 1 tablet by mouth Daily. 5/20/19   Linda Hoffman DNP, APRN   furosemide (LASIX) 20 MG tablet Take 20 mg by mouth Daily As Needed (Swelling).    Phil Constantino MD   lacosamide (VIMPAT) 100 MG tablet tablet Take 1 tablet by mouth Every 12 (Twelve) Hours. 6/12/19   Harpreet Arroyo MD   levETIRAcetam (KEPPRA) 750 MG tablet Take 2 tablets by mouth Every 12 (Twelve) Hours. 6/6/19   Tommie Thornton APRN   losartan (COZAAR) 100 MG tablet Take 100 mg by mouth Daily.    Phil Constantino MD   Omega-3 Fatty Acids (FISH OIL) 1000 MG capsule capsule Take 2 capsules by mouth Daily With Breakfast. 5/20/19   Linda Hoffman DNP, APRN   omeprazole (PRILOSEC) 10 MG capsule Take 1 capsule by mouth Daily. For acid reflux 5/20/19   Linda Hoffman DNP, APRN   oxyCODONE-acetaminophen (PERCOCET)  MG per tablet Take 1 tablet by mouth Every 4 (Four) Hours As Needed for Moderate Pain . 5/17/19   Oral Jessica III, MD   QUEtiapine (SEROquel) 100 MG tablet Take 1 tablet by mouth every night at bedtime. 2/22/19   Linda Hoffman DNP, APRN   spironolactone (ALDACTONE) 25 MG tablet Take 25 mg by mouth Daily.    Phil Constantino MD   venlafaxine XR (EFFEXOR-XR) 150 MG 24 hr capsule Take 1 capsule by mouth Daily. 5/20/19   Linda Hoffman, DNP, APRN   vitamin B-6 (PYRIDOXINE) 100 MG tablet Take 1 tablet by mouth Daily. 4/3/19   Royal Todd PA   methylPREDNISolone (MEDROL, PARKER,) 4 MG tablet Take as directed on package  instructions. 19  Tommie Thornton APRN       [facility] scheduled medications:     chlorhexidine 15 mL Mouth/Throat Q12H   enoxaparin 40 mg Subcutaneous Q24H   pantoprazole 40 mg Intravenous Q AM   sodium chloride 3 mL Intravenous Q12H     Hospital PRN medications:  •  acetaminophen **OR** acetaminophen  •  LORazepam  •  ondansetron  •  sodium chloride    Allergies   Allergen Reactions   • Lipitor [Atorvastatin] Rash   • Lisinopril Angioedema     Swell tongue       Social History     Socioeconomic History   • Marital status: Single     Spouse name: Not on file   • Number of children: 2   • Years of education: Not on file   • Highest education level: Not on file   Occupational History   • Occupation: ELECTRIAL WORK   Tobacco Use   • Smoking status: Former Smoker     Packs/day: 0.33     Years: 30.00     Pack years: 9.90     Types: Cigarettes     Last attempt to quit: 7/3/2017     Years since quittin.9   • Smokeless tobacco: Never Used   Substance and Sexual Activity   • Alcohol use: No     Frequency: Never     Comment: used to drink alot but now just ocasional beer since has seizure in 2017   • Drug use: No   • Sexual activity: Defer     Family History   Problem Relation Age of Onset   • Hypertension Mother    • Diabetes Mother    • Heart disease Father    • Hypertension Father    • No Known Problems Daughter    • No Known Problems Son    • Diabetes Maternal Grandmother    • No Known Problems Maternal Grandfather    • No Known Problems Paternal Grandmother    • Heart attack Paternal Grandfather    • Kidney disease Sister        Review of Systems  A 14 point review of systems was unobtainable as patient is intubated and sedated    Vital Signs   Temp:  [96.6 °F (35.9 °C)-97.9 °F (36.6 °C)] 97.9 °F (36.6 °C)  Heart Rate:  [62-85] 74  Resp:  [14-23] 23  BP: ()/(61-99) 109/81  FiO2 (%):  [30 %] 30 %    General Exam:  Head:  Normal cephalic, atraumatic  HEENT:  Neck supple  Fundoscopic Exam:  No  signs of disc edema  CVS:  Regular rate and rhythm.  No murmurs  Carotid Examination:  No bruits  Lungs:  Clear to auscultation  Abdomen:  Non-tender, Non-distended  Extremities:  No signs of peripheral edema  Skin:  No rashes    Neurologic Exam:    Mental Status:    -Awake, Alert, Oriented X 3  -No word finding difficulties  -No aphasia  -No dysarthria  -Follows simple and complex commands    CN II:  Visual fields full.  Pupils equally reactive to light  CN III, IV, VI:  Extraocular Muscles full with no signs of nystagmus  CN V:  Facial sensory is symmetric   CN VII:  Facial motor symmetric  CN VIII:  Gross hearing intact bilaterally  CN IX/X:  Palate elevates symmetrically  CN XI:  Shoulder shrug symmetric  CN XII:  Tongue is midline on protrusion    Motor: (strength out of 5:  1= minimal movement, 2 = movement in plane of gravity, 3 = movement against gravity, 4 = movement against some resistance, 5 = full strength)    -Right Upper Ext: Proximal: 5 Distal: 5  -Left Upper Ext: Proximal: 5 Distal: 5    -Right Lower Ext: Proximal: 5 Distal: 5  -Left Lower Ext: Proximal: 5 Distal: 5    DTR:  -Right   Bicep: 2+ Triceps: 2+ Brachioradialis: 2+   Patella: 2+ Ankle: 2+ Neg Babinski  -Left   Bicep: 2+ Triceps: 2+ Brachioradialis: 2+   Patella: 2+ Ankle: 2+ Neg Babinski    Sensory:  -Intact to light touch, pinprick, temperature, pain, and proprioception    Coordination:  -Finger to nose intact  -Heel to shin intact  -No ataxia    Gait  -No signs of ataxia  -ambulates unassisted      Results Review:  Lab Results (last 7 days)     Procedure Component Value Units Date/Time    Lactic Acid, Plasma [[id number]]  (Abnormal) Collected:  06/14/19 0914    Specimen:  Blood Updated:  06/14/19 0958     Lactate 2.1 mmol/L     Lactic Acid, Reflex Timer (This will reflex a repeat order 3-3:15 hours after ordered.) [[id number]] Collected:  06/14/19 0914    Specimen:  Blood Updated:  06/14/19 0958    Basic Metabolic Panel [[id number]]   (Abnormal) Collected:  06/14/19 0914    Specimen:  Blood Updated:  06/14/19 0951     Glucose 81 mg/dL      BUN 26 mg/dL      Creatinine 1.54 mg/dL      Sodium 141 mmol/L      Potassium 3.9 mmol/L      Chloride 110 mmol/L      CO2 24.0 mmol/L      Calcium 8.9 mg/dL      eGFR Non African Amer 47 mL/min/1.73      BUN/Creatinine Ratio 16.9     Anion Gap 7.0 mmol/L     Narrative:       GFR Normal >60  Chronic Kidney Disease <60  Kidney Failure <15    CK [[id number]]  (Normal) Collected:  06/14/19 0349    Specimen:  Blood Updated:  06/14/19 0804     Creatine Kinase 139 U/L     Phenytoin Level, Total [[id number]]  (Normal) Collected:  06/14/19 0724    Specimen:  Blood Updated:  06/14/19 0750     Phenytoin Level 16.4 mcg/mL     Levetiracetam Level (Keppra) [[id number]] Collected:  06/14/19 0724    Specimen:  Blood Updated:  06/14/19 0731    Blood Gas, Arterial With Co-Ox [[id number]]  (Abnormal) Collected:  06/14/19 0539    Specimen:  Arterial Blood Updated:  06/14/19 0540     Site Right Radial     Gera's Test N/A     pH, Arterial 7.467 pH units      Comment: 83 Value above reference range        pCO2, Arterial 27.0 mm Hg      Comment: 84 Value below reference range        pO2, Arterial 135.0 mm Hg      Comment: 83 Value above reference range        HCO3, Arterial 19.5 mmol/L      Comment: 84 Value below reference range        Base Excess, Arterial -2.9 mmol/L      Comment: 84 Value below reference range        O2 Saturation, Arterial 99.3 %      Comment: 83 Value above reference range        Hemoglobin, Blood Gas 13.3 g/dL      Comment: 84 Value below reference range        Hematocrit, Blood Gas 40.6 %      Oxyhemoglobin 97.7 %      Methemoglobin 1.10 %      Carboxyhemoglobin 0.5 %      A-a Gradiant -- mmHg      Comment: UNABLE TO CALCULATE        Temperature 37.0 C      Sodium, Arterial 141 mmol/L      Potassium, Arterial 3.8 mmol/L      Barometric Pressure for Blood Gas 754 mmHg      Modality Ventilator     FIO2 30 %       Ventilator Mode AC     Set Tidal Volume 600     Set [facility] Resp Rate 14.0     PEEP 5.0     Note --     Collected by [id number]     Comment: Meter: [id number]     :  [id number]        pH, Temp Corrected -- pH Units      pCO2, Temperature Corrected -- mm Hg      pO2, Temperature Corrected -- mm Hg     [place] Draw [[id number]] Collected:  06/14/19 0349    Specimen:  Blood Updated:  06/14/19 0500    Narrative:       The following orders were created for panel order [place] Draw.  Procedure                               Abnormality         Status                     ---------                               -----------         ------                     Light Blue Top[[id number]]                                   Final result               Green Top (Gel)[[id number]]                                  Final result               Lavender Top[[id number]]                                     Final result               Red Top[[id number]]                                          Final result                 Please view results for these tests on the individual orders.    Light Blue Top [[id number]] Collected:  06/14/19 0349    Specimen:  Blood Updated:  06/14/19 0500     Extra Tube hold for add-on     Comment: Auto resulted       Green Top (Gel) [[id number]] Collected:  06/14/19 0349    Specimen:  Blood Updated:  06/14/19 0500     Extra Tube Hold for add-ons.     Comment: Auto resulted.       Lavender Top [[id number]] Collected:  06/14/19 0349    Specimen:  Blood Updated:  06/14/19 0500     Extra Tube hold for add-on     Comment: Auto resulted       Red Top [[id number]] Collected:  06/14/19 0349    Specimen:  Blood Updated:  06/14/19 0500     Extra Tube Hold for add-ons.     Comment: Auto resulted.       CBC & Differential [[id number]] Collected:  06/14/19 0349    Specimen:  Blood Updated:  06/14/19 0420    Narrative:       The following orders were created for panel order CBC & Differential.  Procedure                                Abnormality         Status                     ---------                               -----------         ------                     CBC Auto Differential[[id number]]        Abnormal            Final result                 Please view results for these tests on the individual orders.    CBC Auto Differential [[id number]]  (Abnormal) Collected:  06/14/19 0349    Specimen:  Blood Updated:  06/14/19 0420     WBC 11.21 10*3/mm3      RBC 4.54 10*6/mm3      Hemoglobin 12.9 g/dL      Hematocrit 39.0 %      MCV 85.9 fL      MCH 28.4 pg      MCHC 33.1 g/dL      RDW 15.1 %      RDW-SD 47.5 fl      MPV 10.4 fL      Platelets 274 10*3/mm3      Neutrophil % 71.8 %      Lymphocyte % 18.7 %      Monocyte % 7.7 %      Eosinophil % 0.3 %      Basophil % 0.2 %      Immature Grans % 1.3 %      Neutrophils, Absolute 8.05 10*3/mm3      Lymphocytes, Absolute 2.10 10*3/mm3      Monocytes, Absolute 0.86 10*3/mm3      Eosinophils, Absolute 0.03 10*3/mm3      Basophils, Absolute 0.02 10*3/mm3      Immature Grans, Absolute 0.15 10*3/mm3      nRBC 0.0 /100 WBC     Magnesium [[id number]]  (Abnormal) Collected:  06/14/19 0349    Specimen:  Blood Updated:  06/14/19 0419     Magnesium 2.3 mg/dL     Comprehensive Metabolic Panel [[id number]]  (Abnormal) Collected:  06/14/19 0349    Specimen:  Blood Updated:  06/14/19 0419     Glucose 113 mg/dL      BUN 27 mg/dL      Creatinine 1.70 mg/dL      Sodium 143 mmol/L      Potassium 5.4 mmol/L      Chloride 110 mmol/L      CO2 28.0 mmol/L      Calcium 8.9 mg/dL      Total Protein 6.3 g/dL      Albumin 3.50 g/dL      ALT (SGPT) 35 U/L      AST (SGOT) 39 U/L      Alkaline Phosphatase 64 U/L      Total Bilirubin 0.3 mg/dL      eGFR Non African Amer 42 mL/min/1.73      Globulin 2.8 gm/dL      A/G Ratio 1.3 g/dL      BUN/Creatinine Ratio 15.9     Anion Gap 5.0 mmol/L     Narrative:       GFR Normal >60  Chronic Kidney Disease <60  Kidney Failure <15    Valproic Acid Level, Total [[id number]]  (Normal) Collected:   06/14/19 0349    Specimen:  Blood Updated:  06/14/19 0417     Valproic Acid 84.4 mcg/mL           .  Imaging Results (last 24 hours)     Procedure Component Value Units Date/Time    CT Cervical Spine Without Contrast [[id number]] Collected:  06/14/19 0809     Updated:  06/14/19 0814    Narrative:       EXAMINATION:  CT CERVICAL SPINE WO CONTRAST-  6/14/2019 4:46 AM CDT     HISTORY: fall, seizure      COMPARISON: No comparison study.     TECHNIQUE: Radiation dose equals  mGy-cm.  Automated exposure  control dose reduction technique was implemented.     Thin section axial imaging was obtained without intravenous contrast.  2-D sagittal and coronal reconstruction images were generated.     FINDINGS: Examination was sent to [facility] for preliminary  interpretation.     The facets are appropriately aligned.     The vertebral bodies are maintained. Prevertebral soft tissues are  normal without acute fracture or subluxation.     There is multilevel disc degeneration with disc space narrowing endplate  irregularities and osteophyte formation C6-C7, C5-C6, C4-C5 and to a  lesser degree C3-C4.     There is mild posterior listhesis CT for on C5 and mild anterolisthesis  of C3 on C4.     There is relative canal foraminal narrowing at multiple levels  particularly at C5-C6.     There is no CT evidence of posttraumatic disc herniation.       Impression:       1. No CT evidence of acute bony injury to the cervical spine.  2. Multilevel disc degeneration spondylosis.  This report was finalized on 06/14/2019 08:11 by Dr. Pierre Pan MD.    CT Head Without Contrast [[id number]] Collected:  06/14/19 0755     Updated:  06/14/19 0812    Narrative:       EXAMINATION: CT HEAD WO CONTRAST-  6/14/2019 7:55 AM CDT     CT SCAN OF THE HEAD, WITHOUT CONTRAST:      HISTORY: Seizures, history of brain tumor previous right-sided frontal  craniotomy.     COMPARISONS: 06/03/2019 head CT      TECHNIQUE:     Radiation dose equals  mGy-cm.   Automated exposure control dose  reduction technique was implemented.        CT evaluation of the head without intravenous contrast. 5 mm transaxial  images were obtained.   2-D sagittal and coronal reconstruction images  were generated.     FINDINGS: Examination was sent to [facility] for preliminary  interpretation.     Changes from right craniotomy observed.     The right sided high density lesion is again observed superiorly in the  right frontal lobe, near the midline, extending from the corpus callosum  was noted previously and is essentially unchanged. Second probable high  density lesion observed more laterally in the right frontal lobe near  the craniotomy defect. The CT appearance is most likely unchanged.     There is no mass effect or midline shift.     There is no hydrocephalus.     There is no developing hemorrhage.     Mucosal thickening observed in the ethmoid sinuses.             Impression:       1. Stable CT appearance of the head compared to 06/03/2019, as described  above.                              This report was finalized on 06/14/2019 08:08 by Dr. Pierre Pan MD.    XR Chest 1 View [[id number]] Collected:  06/14/19 0705     Updated:  06/14/19 0709    Narrative:       EXAM: XR CHEST 1 VW- - 6/14/2019 4:15 AM CDT     HISTORY: post intubation       COMPARISON: 07/15/2017.      TECHNIQUE:  1 images.  Frontal view of the chest.     FINDINGS:    Endotracheal tube tip projects 1.5 cm from the jolly. No pneumothorax  or large pleural effusion. No dense focal consolidation. Prominent  cardiac silhouette. Upper mediastinum within normal limits. No acute or  suspicious bony findings. Probable old right clavicle fracture.          Impression:       1. Endotracheal tube tip projects 1.5 cm above the jolly.  2. No focal consolidation.  This report was finalized on 06/14/2019 07:06 by Dr Sindy Mota MD.          EEG 6/14/2019  Interpretation: This is an abnormal EEG recording secondary to slowing of  the background rhythm and a burst suppression pattern.  This may be seen as the result of metabolic and  medication related encephalopathy.  No definitive seizure activity was noted.  Clinical correlation is recommended.    Impression  1. Status epilepticus, with seizures aborted with propofol. Suspect he went into status after emesis when he threw up his AED's.   2.  Grade 3 Anaplastic Astrocytoma x 2 of right frontal lobe--larger than before but same as a week ago  3. When propofol is stopped he will begin to posture with his left side today and this occurred during his EEG. No EEG evidence of seizure activity is associated with posturing. EEG today shows burst suppression pattern which is the  goal pattern of EEG while on propofol in order to abort seizures.         Plan    · Obtain Dilantin level, valproic level, Keppra level and lacosamide.  · Increase Vimpat to 150 mg BID  · EEG ordered and done--see above  · Will need to keep in burst suppression pattern today and lighten tomorrow  · He will need repeat EEG either tomorrow or Sunday--depends on how he is doing clinically.     85 minutes of critical care time was performed ,during this time I consulted with the nursing staff and also with other providers in regard to the patient's care. I talked with the family about test results    I discussed the patients findings and my recommendations with family, nursing staff and Dr Katie Parker MD  06/14/19  10:53 AM

## 2019-06-15 ENCOUNTER — APPOINTMENT (OUTPATIENT)
Dept: NEUROLOGY | Facility: HOSPITAL | Age: 54
End: 2019-06-15

## 2019-06-15 ENCOUNTER — APPOINTMENT (OUTPATIENT)
Dept: GENERAL RADIOLOGY | Facility: HOSPITAL | Age: 54
End: 2019-06-15

## 2019-06-15 LAB
ALBUMIN SERPL-MCNC: 2.7 G/DL (ref 3.5–5)
ALBUMIN/GLOB SERPL: 1.1 G/DL (ref 1.1–2.5)
ALP SERPL-CCNC: 50 U/L (ref 24–120)
ALT SERPL W P-5'-P-CCNC: 48 U/L (ref 0–54)
ANION GAP SERPL CALCULATED.3IONS-SCNC: 8 MMOL/L (ref 4–13)
ARTERIAL PATENCY WRIST A: POSITIVE
ARTERIAL PATENCY WRIST A: POSITIVE
AST SERPL-CCNC: 50 U/L (ref 7–45)
ATMOSPHERIC PRESS: 747 MMHG
ATMOSPHERIC PRESS: 749 MMHG
BASE EXCESS BLDA CALC-SCNC: -2.5 MMOL/L (ref 0–2)
BASE EXCESS BLDA CALC-SCNC: -3.2 MMOL/L (ref 0–2)
BASOPHILS # BLD AUTO: 0.04 10*3/MM3 (ref 0–0.2)
BASOPHILS NFR BLD AUTO: 0.3 % (ref 0–2)
BDY SITE: ABNORMAL
BDY SITE: ABNORMAL
BILIRUB SERPL-MCNC: 0.7 MG/DL (ref 0.1–1)
BODY TEMPERATURE: 37 C
BODY TEMPERATURE: 37 C
BUN BLD-MCNC: 17 MG/DL (ref 5–21)
BUN/CREAT SERPL: 14 (ref 7–25)
CALCIUM SPEC-SCNC: 8.2 MG/DL (ref 8.4–10.4)
CHLORIDE SERPL-SCNC: 115 MMOL/L (ref 98–110)
CO2 SERPL-SCNC: 20 MMOL/L (ref 24–31)
CREAT BLD-MCNC: 1.21 MG/DL (ref 0.5–1.4)
D-LACTATE SERPL-SCNC: 0.7 MMOL/L (ref 0.5–2)
DEPRECATED RDW RBC AUTO: 48.2 FL (ref 40–54)
EOSINOPHIL # BLD AUTO: 0.17 10*3/MM3 (ref 0–0.7)
EOSINOPHIL NFR BLD AUTO: 1.2 % (ref 0–4)
ERYTHROCYTE [DISTWIDTH] IN BLOOD BY AUTOMATED COUNT: 15.7 % (ref 12–15)
GFR SERPL CREATININE-BSD FRML MDRD: 63 ML/MIN/1.73
GLOBULIN UR ELPH-MCNC: 2.4 GM/DL
GLUCOSE BLD-MCNC: 68 MG/DL (ref 70–100)
GLUCOSE BLDC GLUCOMTR-MCNC: 80 MG/DL (ref 70–130)
HCO3 BLDA-SCNC: 18.6 MMOL/L (ref 20–26)
HCO3 BLDA-SCNC: 19.7 MMOL/L (ref 20–26)
HCT VFR BLD AUTO: 35.2 % (ref 40–52)
HGB BLD-MCNC: 11.9 G/DL (ref 14–18)
HOROWITZ INDEX BLD+IHG-RTO: 30 %
HOROWITZ INDEX BLD+IHG-RTO: 30 %
IMM GRANULOCYTES # BLD AUTO: 0.09 10*3/MM3 (ref 0–0.05)
IMM GRANULOCYTES NFR BLD AUTO: 0.6 % (ref 0–5)
LYMPHOCYTES # BLD AUTO: 3.03 10*3/MM3 (ref 0.72–4.86)
LYMPHOCYTES NFR BLD AUTO: 21.8 % (ref 15–45)
Lab: ABNORMAL
Lab: ABNORMAL
MCH RBC QN AUTO: 28.8 PG (ref 28–32)
MCHC RBC AUTO-ENTMCNC: 33.8 G/DL (ref 33–36)
MCV RBC AUTO: 85.2 FL (ref 82–95)
MODALITY: ABNORMAL
MODALITY: ABNORMAL
MONOCYTES # BLD AUTO: 1.24 10*3/MM3 (ref 0.19–1.3)
MONOCYTES NFR BLD AUTO: 8.9 % (ref 4–12)
NEUTROPHILS # BLD AUTO: 9.35 10*3/MM3 (ref 1.87–8.4)
NEUTROPHILS NFR BLD AUTO: 67.2 % (ref 39–78)
NRBC BLD AUTO-RTO: 0.3 /100 WBC (ref 0–0.2)
PCO2 BLDA: 24.5 MM HG (ref 35–45)
PCO2 BLDA: 26.3 MM HG (ref 35–45)
PEEP RESPIRATORY: 5 CM[H2O]
PEEP RESPIRATORY: 5 CM[H2O]
PH BLDA: 7.48 PH UNITS (ref 7.35–7.45)
PH BLDA: 7.49 PH UNITS (ref 7.35–7.45)
PLATELET # BLD AUTO: 185 10*3/MM3 (ref 130–400)
PMV BLD AUTO: 10.9 FL (ref 6–12)
PO2 BLDA: 112 MM HG (ref 83–108)
PO2 BLDA: 121 MM HG (ref 83–108)
POTASSIUM BLD-SCNC: 3.9 MMOL/L (ref 3.5–5.3)
PROT SERPL-MCNC: 5.1 G/DL (ref 6.3–8.7)
RBC # BLD AUTO: 4.13 10*6/MM3 (ref 4.8–5.9)
SAO2 % BLDCOA: 98.5 % (ref 94–99)
SAO2 % BLDCOA: 98.8 % (ref 94–99)
SET MECH RESP RATE: 12
SET MECH RESP RATE: 12
SODIUM BLD-SCNC: 143 MMOL/L (ref 135–145)
VALPROATE SERPL-MCNC: 61.5 MCG/ML (ref 50–100)
VENTILATOR MODE: AC
VENTILATOR MODE: AC
VT ON VENT VENT: 540 ML
VT ON VENT VENT: 540 ML
WBC NRBC COR # BLD: 13.92 10*3/MM3 (ref 4.8–10.8)

## 2019-06-15 PROCEDURE — 82962 GLUCOSE BLOOD TEST: CPT

## 2019-06-15 PROCEDURE — 25010000002 CEFTRIAXONE PER 250 MG: Performed by: INTERNAL MEDICINE

## 2019-06-15 PROCEDURE — 25010000002 DEXAMETHASONE PER 1 MG: Performed by: NEUROLOGICAL SURGERY

## 2019-06-15 PROCEDURE — 80053 COMPREHEN METABOLIC PANEL: CPT | Performed by: INTERNAL MEDICINE

## 2019-06-15 PROCEDURE — 87040 BLOOD CULTURE FOR BACTERIA: CPT | Performed by: INTERNAL MEDICINE

## 2019-06-15 PROCEDURE — 36600 WITHDRAWAL OF ARTERIAL BLOOD: CPT

## 2019-06-15 PROCEDURE — 99024 POSTOP FOLLOW-UP VISIT: CPT | Performed by: NURSE PRACTITIONER

## 2019-06-15 PROCEDURE — 94799 UNLISTED PULMONARY SVC/PX: CPT

## 2019-06-15 PROCEDURE — 82803 BLOOD GASES ANY COMBINATION: CPT

## 2019-06-15 PROCEDURE — 99291 CRITICAL CARE FIRST HOUR: CPT | Performed by: PSYCHIATRY & NEUROLOGY

## 2019-06-15 PROCEDURE — 71045 X-RAY EXAM CHEST 1 VIEW: CPT

## 2019-06-15 PROCEDURE — 85025 COMPLETE CBC W/AUTO DIFF WBC: CPT | Performed by: INTERNAL MEDICINE

## 2019-06-15 PROCEDURE — 25010000002 LORAZEPAM PER 2 MG: Performed by: INTERNAL MEDICINE

## 2019-06-15 PROCEDURE — 25010000002 ENOXAPARIN PER 10 MG: Performed by: INTERNAL MEDICINE

## 2019-06-15 PROCEDURE — 80164 ASSAY DIPROPYLACETIC ACD TOT: CPT | Performed by: PSYCHIATRY & NEUROLOGY

## 2019-06-15 PROCEDURE — 94003 VENT MGMT INPAT SUBQ DAY: CPT

## 2019-06-15 PROCEDURE — 25010000002 LEVETRIRACETAM PER 10 MG: Performed by: PSYCHIATRY & NEUROLOGY

## 2019-06-15 PROCEDURE — 83605 ASSAY OF LACTIC ACID: CPT | Performed by: INTERNAL MEDICINE

## 2019-06-15 PROCEDURE — 95951 HC EEG WITH VIDEO RECORDING EACH 24 HRS: CPT

## 2019-06-15 PROCEDURE — 25010000002 PROPOFOL 1000 MG/ML EMULSION: Performed by: INTERNAL MEDICINE

## 2019-06-15 PROCEDURE — 95951 EEG CONTINUOUS MONITORING: CPT | Performed by: PSYCHIATRY & NEUROLOGY

## 2019-06-15 PROCEDURE — 25010000002 AZITHROMYCIN PER 500 MG: Performed by: INTERNAL MEDICINE

## 2019-06-15 RX ORDER — DEXAMETHASONE SODIUM PHOSPHATE 10 MG/ML
6 INJECTION INTRAMUSCULAR; INTRAVENOUS EVERY 6 HOURS
Status: DISCONTINUED | OUTPATIENT
Start: 2019-06-15 | End: 2019-06-19

## 2019-06-15 RX ORDER — VALPROIC ACID 250 MG/5ML
750 SOLUTION ORAL EVERY 6 HOURS SCHEDULED
Status: DISCONTINUED | OUTPATIENT
Start: 2019-06-15 | End: 2019-06-16

## 2019-06-15 RX ORDER — PANTOPRAZOLE SODIUM 40 MG/10ML
40 INJECTION, POWDER, LYOPHILIZED, FOR SOLUTION INTRAVENOUS
Status: DISCONTINUED | OUTPATIENT
Start: 2019-06-15 | End: 2019-06-17

## 2019-06-15 RX ADMIN — VALPROIC ACID 500 MG: 250 SOLUTION ORAL at 12:03

## 2019-06-15 RX ADMIN — ENOXAPARIN SODIUM 40 MG: 40 INJECTION SUBCUTANEOUS at 08:45

## 2019-06-15 RX ADMIN — LEVETIRACETAM 1250 MG: 500 INJECTION, SOLUTION INTRAVENOUS at 08:46

## 2019-06-15 RX ADMIN — DEXAMETHASONE SODIUM PHOSPHATE 6 MG: 10 INJECTION INTRAMUSCULAR; INTRAVENOUS at 12:04

## 2019-06-15 RX ADMIN — PROPOFOL 75 MCG/KG/MIN: 10 INJECTION, EMULSION INTRAVENOUS at 12:02

## 2019-06-15 RX ADMIN — SODIUM CHLORIDE 100 ML/HR: 9 INJECTION, SOLUTION INTRAVENOUS at 01:35

## 2019-06-15 RX ADMIN — LEVETIRACETAM 1250 MG: 500 INJECTION, SOLUTION INTRAVENOUS at 22:18

## 2019-06-15 RX ADMIN — SODIUM CHLORIDE, PRESERVATIVE FREE 3 ML: 5 INJECTION INTRAVENOUS at 22:24

## 2019-06-15 RX ADMIN — SODIUM CHLORIDE 100 ML/HR: 9 INJECTION, SOLUTION INTRAVENOUS at 19:41

## 2019-06-15 RX ADMIN — SODIUM CHLORIDE 150 MG: 9 INJECTION, SOLUTION INTRAVENOUS at 01:13

## 2019-06-15 RX ADMIN — LORAZEPAM 1 MG: 2 INJECTION INTRAMUSCULAR; INTRAVENOUS at 01:13

## 2019-06-15 RX ADMIN — PROPOFOL 75 MCG/KG/MIN: 10 INJECTION, EMULSION INTRAVENOUS at 02:35

## 2019-06-15 RX ADMIN — VALPROIC ACID 750 MG: 250 SOLUTION ORAL at 18:18

## 2019-06-15 RX ADMIN — PROPOFOL 75 MCG/KG/MIN: 10 INJECTION, EMULSION INTRAVENOUS at 04:57

## 2019-06-15 RX ADMIN — PROPOFOL 75 MCG/KG/MIN: 10 INJECTION, EMULSION INTRAVENOUS at 07:54

## 2019-06-15 RX ADMIN — VALPROIC ACID 500 MG: 250 SOLUTION ORAL at 00:12

## 2019-06-15 RX ADMIN — CHLORHEXIDINE GLUCONATE 15 ML: 1.2 RINSE ORAL at 22:22

## 2019-06-15 RX ADMIN — PANTOPRAZOLE SODIUM 40 MG: 40 INJECTION, POWDER, FOR SOLUTION INTRAVENOUS at 12:04

## 2019-06-15 RX ADMIN — AZITHROMYCIN MONOHYDRATE 500 MG: 500 INJECTION, POWDER, LYOPHILIZED, FOR SOLUTION INTRAVENOUS at 23:56

## 2019-06-15 RX ADMIN — DEXAMETHASONE SODIUM PHOSPHATE 6 MG: 10 INJECTION INTRAMUSCULAR; INTRAVENOUS at 18:18

## 2019-06-15 RX ADMIN — CEFTRIAXONE SODIUM 1 G: 1 INJECTION, POWDER, FOR SOLUTION INTRAMUSCULAR; INTRAVENOUS at 23:13

## 2019-06-15 RX ADMIN — PROPOFOL 75 MCG/KG/MIN: 10 INJECTION, EMULSION INTRAVENOUS at 14:05

## 2019-06-15 RX ADMIN — CHLORHEXIDINE GLUCONATE 15 ML: 1.2 RINSE ORAL at 08:44

## 2019-06-15 RX ADMIN — LORAZEPAM 1 MG: 2 INJECTION INTRAMUSCULAR; INTRAVENOUS at 01:35

## 2019-06-15 RX ADMIN — SODIUM CHLORIDE 150 MG: 9 INJECTION, SOLUTION INTRAVENOUS at 12:04

## 2019-06-15 RX ADMIN — VALPROIC ACID 500 MG: 250 SOLUTION ORAL at 06:06

## 2019-06-15 NOTE — PLAN OF CARE
Problem: Patient Care Overview  Goal: Plan of Care Review  Outcome: Ongoing (interventions implemented as appropriate)   06/15/19 0335   OTHER   Outcome Summary pt VSS, afebrile, no signs of pain. withdrawals from pain on all extremities. ativan given per MAR. Propofol remains on 75. will continue to monitor.    Coping/Psychosocial   Plan of Care Reviewed With patient   Plan of Care Review   Progress no change     Goal: Individualization and Mutuality  Outcome: Ongoing (interventions implemented as appropriate)    Goal: Discharge Needs Assessment  Outcome: Ongoing (interventions implemented as appropriate)    Goal: Interprofessional Rounds/Family Conf  Outcome: Ongoing (interventions implemented as appropriate)      Problem: Fall Risk (Adult)  Goal: Identify Related Risk Factors and Signs and Symptoms  Outcome: Ongoing (interventions implemented as appropriate)    Goal: Absence of Fall  Outcome: Ongoing (interventions implemented as appropriate)      Problem: Restraint, Nonbehavioral (Nonviolent)  Goal: Rationale and Justification  Outcome: Ongoing (interventions implemented as appropriate)    Goal: Nonbehavioral (Nonviolent) Restraint: Absence of Injury/Harm  Outcome: Ongoing (interventions implemented as appropriate)    Goal: Nonbehavioral (Nonviolent) Restraint: Achievement of Discontinuation Criteria  Outcome: Ongoing (interventions implemented as appropriate)    Goal: Nonbehavioral (Nonviolent) Restraint: Preservation of Dignity and Wellbeing  Outcome: Ongoing (interventions implemented as appropriate)      Problem: Seizure Disorder/Epilepsy (Adult)  Goal: Signs and Symptoms of Listed Potential Problems Will be Absent, Minimized or Managed (Seizure Disorder/Epilepsy)  Outcome: Ongoing (interventions implemented as appropriate)

## 2019-06-15 NOTE — PROGRESS NOTES
Neurology Progress Note      Date of admission: 6/14/2019  3:46 AM  Date of visit: 6/15/2019    Chief Complaint:  F/u seizures    Subjective     Subjective:    Patient had 3 events yesterday/last night interpreted by nursing as seizures for which he received ativan--this was while he was on propofol   Nursing described left sided posturing and twitching of face and chest and arm on the left.  Per nursing he needs 75 mics of propofol to stop moving--and he attempted to pull out tube on lower dose    Patient has received dose of radiation and chemo  Medications:  Current Facility-Administered Medications   Medication Dose Route Frequency Provider Last Rate Last Dose   • acetaminophen (TYLENOL) tablet 650 mg  650 mg Oral Q4H PRN Robe Dorsey DO        Or   • acetaminophen (TYLENOL) suppository 650 mg  650 mg Rectal Q4H PRN Robe Dorsey DO       • chlorhexidine (PERIDEX) 0.12 % solution 15 mL  15 mL Mouth/Throat Q12H Robe Dorsey DO   15 mL at 06/15/19 0844   • dexamethasone (DECADRON) injection 6 mg  6 mg Intravenous Q6H Dillon Trevino MD   6 mg at 06/15/19 1204   • enoxaparin (LOVENOX) syringe 40 mg  40 mg Subcutaneous Q24H Jairon Webb MD   40 mg at 06/15/19 0845   • lacosamide (VIMPAT) 150 mg in sodium chloride 0.9 % 50 mL IVPB  150 mg Intravenous Q12H Doreen Zuniga  mL/hr at 06/15/19 1204 150 mg at 06/15/19 1204   • levETIRAcetam (KEPPRA) 1,250 mg in sodium chloride 0.9 % 100 mL IVPB  1,250 mg Intravenous Q12H Doreen Zuniga MD   1,250 mg at 06/15/19 0846   • LORazepam (ATIVAN) injection 1 mg  1 mg Intravenous Q5 Min PRN Jairon Webb MD   1 mg at 06/15/19 0135   • ondansetron (ZOFRAN) injection 4 mg  4 mg Intravenous Q6H PRN Robe Dorsey DO       • pantoprazole (PROTONIX) injection 40 mg  40 mg Intravenous Q AM Dillon Trevino MD   40 mg at 06/15/19 1204   • propofol (DIPRIVAN) infusion 10 mg/mL 100 mL  5-75 mcg/kg/min Intravenous Titrated  Jairon Webb MD 37.4 mL/hr at 06/15/19 1202 75 mcg/kg/min at 06/15/19 1202   • sodium chloride 0.9 % flush 3 mL  3 mL Intravenous Q12H Robe Dorsey DO   3 mL at 06/14/19 2016   • sodium chloride 0.9 % flush 3-10 mL  3-10 mL Intravenous PRN Robe Dorsey DO       • sodium chloride 0.9 % infusion  100 mL/hr Intravenous Continuous Jiaron Webb  mL/hr at 06/15/19 0135 100 mL/hr at 06/15/19 0135   • Valproic Acid (DEPAKENE) syrup 500 mg  500 mg Oral Q6H Doreen Zuniga MD   500 mg at 06/15/19 1203       Review of Systems:   -A 14 point review of systems is unobtainable due to intubation and sedation  Objective     Objective      Vital Signs  Temp:  [97.6 °F (36.4 °C)-98.5 °F (36.9 °C)] 98.5 °F (36.9 °C)  Heart Rate:  [68-77] 77  Resp:  [17-20] 18  BP: ()/(62-78) 93/65  FiO2 (%):  [30 %] 30 %    Physical Exam:    HEENT:  Neck supple  CVS:  Regular rate and rhythm.  No murmurs  Carotid Examination:  No bruits  Lungs:  Clear to auscultation  Abdomen:  Non-tender, Non-distended  Extremities:  No signs of peripheral edema    Neurologic Exam:    -Intubated.  Not following commands--had him off propofol for 20 minutes.     Cranial nerves II through XII intact.Pupils react. No doll's eyes. No facial asymmetry.  He breathes over the vent   Motor: (strength out of 5:  1= minimal movement, 2 = movement in plane of gravity, 3 = movement against gravity, 4 = movement against some resistance, 5 = full strength)    -He moves all 4 extremities spontaneously Has increased tone in the left upper and lower extremity.    DTR:  2++ throughout in all four extremities    Sensory:  -Intact to light touch, pinprick, temperature, pain, and proprioception    Coordination/Gait:  -No clear ataxia with the limited movement seen (not reliable assessment)     Results Review:    I reviewed the patient's new clinical results.    Lab Results (last 24 hours)     Procedure Component Value Units Date/Time    POC  Glucose Once [[id number]]  (Normal) Collected:  06/15/19 0359    Specimen:  Blood Updated:  06/15/19 0418     Glucose 80 mg/dL      Comment: : [id number] Damon (Scott) DanaMeter ID: [id number]       Blood Gas, Arterial [[id number]]  (Abnormal) Collected:  06/15/19 0350    Specimen:  Arterial Blood Updated:  06/15/19 0401     Site Right Radial     Gera's Test Positive     pH, Arterial 7.482 pH units      Comment: 83 Value above reference range        pCO2, Arterial 26.3 mm Hg      Comment: 84 Value below reference range        pO2, Arterial 112.0 mm Hg      Comment: 83 Value above reference range        HCO3, Arterial 19.7 mmol/L      Comment: 84 Value below reference range        Base Excess, Arterial -2.5 mmol/L      Comment: 84 Value below reference range        O2 Saturation, Arterial 98.5 %      Temperature 37.0 C      Barometric Pressure for Blood Gas 749 mmHg      Modality Ventilator     FIO2 30 %      Ventilator Mode AC     Set Tidal Volume 540     Set Mech Resp Rate 12.0     PEEP 5.0     Collected by [id number]     Comment: Meter: [id number]     :  [id number]       Valproic Acid Level, Total [[id number]]  (Normal) Collected:  06/15/19 0228    Specimen:  Blood Updated:  06/15/19 0319     Valproic Acid 61.5 mcg/mL     Comprehensive Metabolic Panel [[id number]]  (Abnormal) Collected:  06/15/19 0228    Specimen:  Blood Updated:  06/15/19 0314     Glucose 68 mg/dL      BUN 17 mg/dL      Creatinine 1.21 mg/dL      Sodium 143 mmol/L      Potassium 3.9 mmol/L      Chloride 115 mmol/L      CO2 20.0 mmol/L      Calcium 8.2 mg/dL      Total Protein 5.1 g/dL      Albumin 2.70 g/dL      ALT (SGPT) 48 U/L      AST (SGOT) 50 U/L      Alkaline Phosphatase 50 U/L      Total Bilirubin 0.7 mg/dL      eGFR Non African Amer 63 mL/min/1.73      Globulin 2.4 gm/dL      A/G Ratio 1.1 g/dL      BUN/Creatinine Ratio 14.0     Anion Gap 8.0 mmol/L     Narrative:       GFR Normal >60  Chronic Kidney Disease <60  Kidney  Failure <15    CBC Auto Differential [[id number]]  (Abnormal) Collected:  06/15/19 0228    Specimen:  Blood Updated:  06/15/19 0301     WBC 13.92 10*3/mm3      RBC 4.13 10*6/mm3      Hemoglobin 11.9 g/dL      Hematocrit 35.2 %      MCV 85.2 fL      MCH 28.8 pg      MCHC 33.8 g/dL      RDW 15.7 %      RDW-SD 48.2 fl      MPV 10.9 fL      Platelets 185 10*3/mm3      Neutrophil % 67.2 %      Lymphocyte % 21.8 %      Monocyte % 8.9 %      Eosinophil % 1.2 %      Basophil % 0.3 %      Immature Grans % 0.6 %      Neutrophils, Absolute 9.35 10*3/mm3      Lymphocytes, Absolute 3.03 10*3/mm3      Monocytes, Absolute 1.24 10*3/mm3      Eosinophils, Absolute 0.17 10*3/mm3      Basophils, Absolute 0.04 10*3/mm3      Immature Grans, Absolute 0.09 10*3/mm3      nRBC 0.3 /100 WBC     POC Glucose Once [[id number]]  (Normal) Collected:  06/14/19 1609    Specimen:  Blood Updated:  06/14/19 1630     Glucose 75 mg/dL      Comment: : [id number] Lopez AprilMeter ID: [id number]       Lactic Acid, Reflex [[id number]]  (Normal) Collected:  06/14/19 1327    Specimen:  Blood Updated:  06/14/19 1356     Lactate 1.7 mmol/L     Lactic Acid, Reflex Timer (This will reflex a repeat order 3-3:15 hours after ordered.) [[id number]] Collected:  06/14/19 0914    Specimen:  Blood Updated:  06/14/19 1300     Extra Tube Hold for add-ons.     Comment: Auto resulted.           Imaging Results (last 24 hours)     Procedure Component Value Units Date/Time    XR Chest 1 View [[id number]] Collected:  06/15/19 0810     Updated:  06/15/19 0813    Narrative:       Frontal upright radiograph of the chest 6/15/2019 3:45 AM CDT     COMPARISON: 06/14/2019.     HISTORY: Patient intubated.     FINDINGS:   The lungs are clear. The cardiac silhouette is normal. Endotracheal tube  and nasogastric tube are satisfactorily position.      The osseous structures and surrounding soft tissues demonstrate no acute  abnormality.       Impression:       1. No radiographic evidence  of acute cardiopulmonary process.        This report was finalized on 06/15/2019 08:10 by Dr. Adolph Echols MD.    XR Abdomen KUB [[id number]] Collected:  06/14/19 1313     Updated:  06/14/19 1317    Narrative:       XR ABDOMEN KUB- 6/14/2019 12:25 PM CDT     HISTORY: New OG needing to give meds via this route; G40.901-Epilepsy,  unspecified, not intractable, with status epilepticus; C71.9-Malignant  neoplasm of brain, unspecified       COMPARISON: 07/14/2017     FINDINGS:  There is a nonspecific bowel gas pattern. Nasogastric tube is present  the distal tip satisfactorily positioned in the region of the body the  stomach. Endotracheal tube is present just proximal to the jolly.     No acute skeletal abnormality is identified.        Impression:       1. Nasogastric tube satisfactorily position distal tip region of the  body the stomach.         This report was finalized on 06/14/2019 13:14 by Dr. Adolph Echols MD.          Assessment/Plan     Hospital Problem List      Seizures (CMS/HCC)    Astrocytoma brain tumor (CMS/HCC)    Status epilepticus (CMS/HCC)    Hyperkalemia    Acute kidney injury (CMS/HCC) on chronic kidney disease stage 3    Impression:  1. Seizures   Depakote level on Depakene dropped and may have precipitated break through seizures.  With all of his EEGs and all of his seizures not one EEG has shown a clear epileptiform discharge.and he is on 3 anticonvulsants plus propofol in burst suppression pattern when EEG last checked and yet nursing thinks he is still having seizures.   Plan:  Increase depakene to 750 mg q 6 hours.   · Continue IV Vimpat 200 mg Bid  · Continue IV Keppra 1250 mg Bid.  · Continuous EEG--once EEG is hooked up will begin to lower propofol and see if moves/twitching correlates with seizures.     · Daily valproate levels    35 minutes of critical care time was performed, during this time I consulted with the nursing staff and also with other providers in regard to the patient's  care.     Doreen Parker MD  06/15/19  12:49 PM

## 2019-06-15 NOTE — PROGRESS NOTES
Neurosurgery Daily Progress Note    Assessment:   Lukasz Savage is a 54 y.o. with a significant PMH of malignant hypertension, MSSA, Obstructive sleep apnea, Grade 3 Anaplastic Astrocytoma and Seizures.  He presents with a complaint of increased seizure-like activity.  Physical exam findings of he is currently intubated on low dose propofol, pupil irregularity, roving eye movement, and decorticate posturing.  His imaging shows stable, unchanged intracranial masses, and no evidence of hemorrhage.    DDX:     Seizures (CMS/HCC)    Astrocytoma brain tumor (CMS/HCC)    Status epilepticus (CMS/HCC)    Hyperkalemia    Acute kidney injury (CMS/HCC) on chronic kidney disease stage 3    Patient Active Problem List   Diagnosis   • Hyperlipidemia   • HTN (hypertension)   • Gout   • Anxiety   • Arthritis   • Erectile dysfunction   • Angio-edema   • Seizures (CMS/HCC)   • HCAP (healthcare-associated pneumonia)   • MSSA (methicillin susceptible Staphylococcus aureus) infection   • Foot deformity   • Chronic gout of right foot   • Peripheral edema   • S/P shoulder surgery   • Claudication (CMS/HCC)   • Cigarette smoker   • BMI 30.0-30.9,adult   • BMI 33.0-33.9,adult   • Astrocytoma brain tumor (CMS/HCC)   • Encounter for consultation   • Benign neoplasm of brain (CMS/HCC)   • Status epilepticus (CMS/HCC)   • Hyperkalemia   • Acute kidney injury (CMS/HCC) on chronic kidney disease stage 3     Plan:   Neuro: Grade 3 Anaplastic Astrocytoma and Seizures   Stable   Remains intubated/ sedated   CT head stable/ unchanged    EEG: This is an abnormal EEG recording secondary to slowing of the background rhythm and a burst suppression pattern.  This may be seen as the result of metabolic and  medication related  encephalopathy.  No definitive seizure activity was noted.  Clinical correlation is recommended.                 Addendum: A burst suppression pattern is the ideal goal for treating seizures.     Per Dr. Jessica   This patient  "started radiation for his grade 3 astrocytoma on June 12, 2019.  He has received 2 treatment fractions and at this time is admitted with status epilepticus and is intubated.     At this time we will hold his radiation until he recovers from his current acute situation is able to resume radiotherapy treatments.     We will reevaluate him next week for consideration of resuming radiation.    Continue current plan of care.    Chief complaint:   Seizures    Subjective:  Symptoms:  Stable.    Intubated/ sedated    Temp:  [97.6 °F (36.4 °C)-97.9 °F (36.6 °C)] 97.6 °F (36.4 °C)  Heart Rate:  [67-79] 73  Resp:  [17-20] 18  BP: ()/(62-92) 95/67  FiO2 (%):  [30 %] 30 %    Objective:  General Appearance:  Comfortable and in no acute distress.    Vital signs: (most recent): Blood pressure 95/67, pulse 73, temperature 97.6 °F (36.4 °C), temperature source Axillary, resp. rate 18, height 172.7 cm (68\"), weight 82.8 kg (182 lb 9.6 oz), SpO2 99 %.        Neurologic Exam     Mental Status   Intubated.  No seizure like activity at present.     Cranial Nerves     CN III, IV, VI   Pupils are equal, round, and reactive to light.    Motor Exam   Overall muscle tone: normal    Drains:   NG/OG Tube Orogastric (Active)   Placement Verification X-ray 6/15/2019  7:45 AM   Secured at (cm) 70 6/15/2019  7:45 AM   Status Clamped 6/15/2019  4:00 AM   Surrounding Skin Dry;Intact 6/15/2019  4:00 AM       Urethral Catheter (Active)   Daily Indications Acute retention 6/15/2019  7:45 AM   Site Assessment Clean;Skin intact 6/15/2019  7:45 AM   Collection Container Standard drainage bag 6/15/2019  7:45 AM   Securement Method Securing device 6/15/2019  7:45 AM   Catheter care complete Yes 6/15/2019  7:45 AM   Output (mL) 200 mL 6/15/2019  4:00 AM       [REMOVED] NG/OG Tube Nasogastric 18 Fr Right nostril (Removed)   Site Assessment Clean;Dry;Intact 6/14/2019  4:30 AM   Securement nasal septal tie 6/14/2019  4:30 AM   Status Suction-low " intermittent 6/14/2019  4:30 AM   Drainage Appearance None 6/14/2019  4:30 AM   Surrounding Skin Intact 6/14/2019  4:30 AM     Imaging Results (last 24 hours)     Procedure Component Value Units Date/Time    XR Chest 1 View [[id number]] Collected:  06/15/19 0810     Updated:  06/15/19 0813    Narrative:       Frontal upright radiograph of the chest 6/15/2019 3:45 AM CDT     COMPARISON: 06/14/2019.     HISTORY: Patient intubated.     FINDINGS:   The lungs are clear. The cardiac silhouette is normal. Endotracheal tube  and nasogastric tube are satisfactorily position.      The osseous structures and surrounding soft tissues demonstrate no acute  abnormality.       Impression:       1. No radiographic evidence of acute cardiopulmonary process.        This report was finalized on 06/15/2019 08:10 by Dr. Adolph Echols MD.    XR Abdomen KUB [[id number]] Collected:  06/14/19 1313     Updated:  06/14/19 1317    Narrative:       XR ABDOMEN KUB- 6/14/2019 12:25 PM CDT     HISTORY: New OG needing to give meds via this route; G40.901-Epilepsy,  unspecified, not intractable, with status epilepticus; C71.9-Malignant  neoplasm of brain, unspecified       COMPARISON: 07/14/2017     FINDINGS:  There is a nonspecific bowel gas pattern. Nasogastric tube is present  the distal tip satisfactorily positioned in the region of the body the  stomach. Endotracheal tube is present just proximal to the jolly.     No acute skeletal abnormality is identified.        Impression:       1. Nasogastric tube satisfactorily position distal tip region of the  body the stomach.         This report was finalized on 06/14/2019 13:14 by Dr. Adolph Echols MD.        Lab Results (last 24 hours)     Procedure Component Value Units Date/Time    POC Glucose Once [[id number]]  (Normal) Collected:  06/15/19 0359    Specimen:  Blood Updated:  06/15/19 0418     Glucose 80 mg/dL      Comment: : [id number] Nelson  DanaMeter ID: [id number]       Blood  Gas, Arterial [[id number]]  (Abnormal) Collected:  06/15/19 0350    Specimen:  Arterial Blood Updated:  06/15/19 0401     Site Right Radial     Gera's Test Positive     pH, Arterial 7.482 pH units      Comment: 83 Value above reference range        pCO2, Arterial 26.3 mm Hg      Comment: 84 Value below reference range        pO2, Arterial 112.0 mm Hg      Comment: 83 Value above reference range        HCO3, Arterial 19.7 mmol/L      Comment: 84 Value below reference range        Base Excess, Arterial -2.5 mmol/L      Comment: 84 Value below reference range        O2 Saturation, Arterial 98.5 %      Temperature 37.0 C      Barometric Pressure for Blood Gas 749 mmHg      Modality Ventilator     FIO2 30 %      Ventilator Mode AC     Set Tidal Volume 540     Set Mech Resp Rate 12.0     PEEP 5.0     Collected by [id number]     Comment: Meter: [id number]     :  [id number]       Valproic Acid Level, Total [[id number]]  (Normal) Collected:  06/15/19 0228    Specimen:  Blood Updated:  06/15/19 0319     Valproic Acid 61.5 mcg/mL     Comprehensive Metabolic Panel [[id number]]  (Abnormal) Collected:  06/15/19 0228    Specimen:  Blood Updated:  06/15/19 0314     Glucose 68 mg/dL      BUN 17 mg/dL      Creatinine 1.21 mg/dL      Sodium 143 mmol/L      Potassium 3.9 mmol/L      Chloride 115 mmol/L      CO2 20.0 mmol/L      Calcium 8.2 mg/dL      Total Protein 5.1 g/dL      Albumin 2.70 g/dL      ALT (SGPT) 48 U/L      AST (SGOT) 50 U/L      Alkaline Phosphatase 50 U/L      Total Bilirubin 0.7 mg/dL      eGFR Non African Amer 63 mL/min/1.73      Globulin 2.4 gm/dL      A/G Ratio 1.1 g/dL      BUN/Creatinine Ratio 14.0     Anion Gap 8.0 mmol/L     Narrative:       GFR Normal >60  Chronic Kidney Disease <60  Kidney Failure <15    CBC Auto Differential [[id number]]  (Abnormal) Collected:  06/15/19 0228    Specimen:  Blood Updated:  06/15/19 0301     WBC 13.92 10*3/mm3      RBC 4.13 10*6/mm3      Hemoglobin 11.9 g/dL       Hematocrit 35.2 %      MCV 85.2 fL      MCH 28.8 pg      MCHC 33.8 g/dL      RDW 15.7 %      RDW-SD 48.2 fl      MPV 10.9 fL      Platelets 185 10*3/mm3      Neutrophil % 67.2 %      Lymphocyte % 21.8 %      Monocyte % 8.9 %      Eosinophil % 1.2 %      Basophil % 0.3 %      Immature Grans % 0.6 %      Neutrophils, Absolute 9.35 10*3/mm3      Lymphocytes, Absolute 3.03 10*3/mm3      Monocytes, Absolute 1.24 10*3/mm3      Eosinophils, Absolute 0.17 10*3/mm3      Basophils, Absolute 0.04 10*3/mm3      Immature Grans, Absolute 0.09 10*3/mm3      nRBC 0.3 /100 WBC     POC Glucose Once [[id number]]  (Normal) Collected:  06/14/19 1609    Specimen:  Blood Updated:  06/14/19 1630     Glucose 75 mg/dL      Comment: : [id number] Lianes AprilMeter ID: [id number]       Lactic Acid, Reflex [[id number]]  (Normal) Collected:  06/14/19 1327    Specimen:  Blood Updated:  06/14/19 1356     Lactate 1.7 mmol/L     Lactic Acid, Reflex Timer (This will reflex a repeat order 3-3:15 hours after ordered.) [[id number]] Collected:  06/14/19 0914    Specimen:  Blood Updated:  06/14/19 1300     Extra Tube Hold for add-ons.     Comment: Auto resulted.       Lactic Acid, Plasma [[id number]]  (Abnormal) Collected:  06/14/19 0914    Specimen:  Blood Updated:  06/14/19 0958     Lactate 2.1 mmol/L     Basic Metabolic Panel [[id number]]  (Abnormal) Collected:  06/14/19 0914    Specimen:  Blood Updated:  06/14/19 0951     Glucose 81 mg/dL      BUN 26 mg/dL      Creatinine 1.54 mg/dL      Sodium 141 mmol/L      Potassium 3.9 mmol/L      Chloride 110 mmol/L      CO2 24.0 mmol/L      Calcium 8.9 mg/dL      eGFR Non African Amer 47 mL/min/1.73      BUN/Creatinine Ratio 16.9     Anion Gap 7.0 mmol/L     Narrative:       GFR Normal >60  Chronic Kidney Disease <60  Kidney Failure <15        [id number]  Jose Frausto, APRN

## 2019-06-15 NOTE — PROGRESS NOTES
PROGRESS NOTE  Patient name: Lukasz Savage  Patient : 1965  VISIT # [id number]  MR #[id number]   CCU 9    SUBJECTIVE:  He continues in CCU. Episodes of focal seizures overnight. Discussed with the family at bedside.    INTERVAL HISTORY:  Mr. 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  He presented to the hospital with seizure activity and was admitted for treatment.  Medical oncology consultation was requested for continuity of care.     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 Trevino for diagnosis of primary brain tumor, grade 3 astrocytoma. The patient was being seen by neurology with complaints of left facial and upper extremity.     · 2019-MRI brain with contrast showed changes in the frontal convexity with a fullness of the sulcal gyral pattern. Specifically, 4.5 x 4.5 x 3.5 cm right frontal lesion. Second, discrete hyperintense nodule measuring 1.1 x 1.9 x 1.5 cm in the subcortical white matter of the paramedian posterior right frontal lobe immediately above the corpus callosum. This was concerning for high-grade glioma.   · 4/15/2019-he underwent a craniotomy with stereotactic biopsy by Dr. Dillon Trevino at [facility]. 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 Further molecular analysis at [facility] revealed IDH1/2 wild type, MGMT non-methylated, TERT mutation. Complex cytogenetics changes A maximum safe resection was not possible due to concerns of significant sequela. Second opinion was recommended at the [facility].   · 2019-he was seen by Dr. Oral Jessica. Recommended concurrent chemoradiation.    · 5/24/2019-he was first seen by me. Recommended concurrent chemoradiation with Temodar.     REVIEW OF SYSTEMS:    Constitutional: no fever                      Lungs: positive intubation  CVS: no palpitation, no chest pain  GI: no abdominal pain, no nausea , no vomiting  DIPAK: no dysuria, frequency and urgency, no hematuria  Musculoskeletal: no joint pain, swelling , stiffness  Endocrine: no polyuria, polydipsia  Hematology: no anemia, no easy brusing or bleeding  Dermatology: no skin rash, no eczema, no pruritus  Neurology: positive for anaplastic glioma, seizures      OBJECTIVE:    Vitals:    06/15/19 0602   BP: 91/65   Pulse: 73   Resp: 18   Temp:    SpO2: 100%       Intake/Output Summary (Last 24 hours) at 6/15/2019 0720  Last data filed at 6/15/2019 0400  Gross per 24 hour   Intake 1392.8 ml   Output 1500 ml   Net -107.2 ml     PHYSICAL EXAM:    CONSTITUTIONAL: Intubated, sedated in CCU   NECK: Supple, no masses   CHEST/LUNGS: CTA bilaterally, normal respiratory effort   CARDIOVASCULAR: RRR, no murmurs  ABDOMEN: soft non-tender, active bowel sounds, no HSM  EXTREMITIES: BLE SCD's  SKIN: warm, dry with no rashes or lesions      CBC  Results from last 7 days   Lab Units 06/15/19  0228 06/14/19  0349   WBC 10*3/mm3 13.92* 11.21*   HEMOGLOBIN g/dL 11.9* 12.9*   HEMATOCRIT % 35.2* 39.0*   PLATELETS 10*3/mm3 185 274         Lab Results   Component Value Date     06/15/2019    K 3.9 06/15/2019     (H) 06/15/2019    CO2 20.0 (L) 06/15/2019    BUN 17 06/15/2019    CREATININE 1.21 06/15/2019    GLUCOSE 68 (L) 06/15/2019    CALCIUM 8.2 (L) 06/15/2019    BILITOT 0.7 06/15/2019    ALKPHOS 50 06/15/2019    AST 50 (H) 06/15/2019    ALT 48 06/15/2019    AGRATIO 1.1 06/15/2019    GLOB 2.4 06/15/2019       Lab Results   Component Value Date    INR 0.98 06/03/2019    INR 0.94 03/22/2018    PROTIME 13.3 06/03/2019    PROTIME 12.8 03/22/2018       Cultures:    No results found for: BLOODCX  No components found for:  URINCX    CT HEAD WO CONTRAST-  6/14/2019 7:55 AM CDT     HISTORY: Seizures, history of brain tumor previous right-sided frontal  craniotomy.     COMPARISONS: 06/03/2019 head CT      TECHNIQUE:     Radiation dose equals  mGy-cm.  Automated exposure control dose  reduction technique was implemented.        CT evaluation of the head without intravenous contrast. 5 mm transaxial  images were obtained.   2-D sagittal and coronal reconstruction images  were generated.     FINDINGS: Examination was sent to statrad for preliminary  interpretation.     Changes from right craniotomy observed.     The right sided high density lesion is again observed superiorly in the  right frontal lobe, near the midline, extending from the corpus callosum  was noted previously and is essentially unchanged. Second probable high  density lesion observed more laterally in the right frontal lobe near  the craniotomy defect. The CT appearance is most likely unchanged.     There is no mass effect or midline shift.     There is no hydrocephalus.     There is no developing hemorrhage.     Mucosal thickening observed in the ethmoid sinuses.           IMPRESSION:  1. Stable CT appearance of the head compared to 06/03/2019, as described  above.                              ASSESSMENT/PLAN:  Anaplastic glioma     He has completed 2 XRT treatment and did take Temodar for 2 days -- 2/12 and 2/13.  Temodar on hold at present     CBC today  WBC 13.92  HGB 11.9  ,000        Seizure activity     CCU monitoring  Vimpat  Depakene  Keppra     Intubated, sedated in CCU     Propofol - not weaned because of intermittent seizures     Discussed with Pt's mother this morning    Discussed with Dr. Trevino    Goals of care-I have discussed with the family.  The family is not ready to make the patient DNR at this time. We discussed about the futility of sustaining life-support for too long if seizures does not improve. They acknowledged understanding but  would like to provide full support for now.     YEVGENIY Hammond    06/15/19  7:20 AM  I have seen, examined and reviewed this patient medication list and appropriate labs and imaging studies. I reviewed others physician’s notes. I have addended and/or modified the above history of present illness, physical examination, and assessment and plan to reflect my findings and impressions.  I discussed essential elements of the care plan with Timmy Pratt and the patient as well. I answered all the questions to the patient’s satisfaction.  I concur with above stated.

## 2019-06-15 NOTE — PROGRESS NOTES
[facility] Medicine Services  INPATIENT PROGRESS NOTE    Patient Name: Lukasz Savage  Date of Admission: 6/14/2019  Today's Date: 06/15/19  Length of Stay: 1  Primary Care Physician: Linda Hoffman, DNP, APRN    Subjective   Chief Complaint: seizures  HPI     Patient seen and examined at bedside.  Patient reportedly had approximately 4 seizure-like activity, that was treated with Ativan.  Patient had some mild hypotension.  Patient overall is stable and synchronizing a little bit better with ventilator.  He does continue to over breathe the ventilator.        Review of Systems   Unable to perform ROS: Intubated        All pertinent negatives and positives are as above. All other systems have been reviewed and are negative unless otherwise stated.     Objective    Temp:  [97.6 °F (36.4 °C)-97.9 °F (36.6 °C)] 97.6 °F (36.4 °C)  Heart Rate:  [67-79] 73  Resp:  [17-20] 18  BP: ()/(62-92) 95/67  FiO2 (%):  [30 %] 30 %  Physical Exam  Constitutional: No distress. He is sedated and intubated.   HENT:   Head: Normocephalic and atraumatic.   Eyes: Conjunctivae are normal. No scleral icterus.   Neck: Neck supple. No JVD present.   Cardiovascular: Normal rate and regular rhythm. Exam reveals no gallop and no friction rub.   No murmur heard.  Pulmonary/Chest: Effort normal and breath sounds normal. No stridor. He is intubated. No respiratory distress. He has no wheezes. He has no rales. Adequate vent synchrony.  Overbreathing the ventilator  Abdominal: Soft. Bowel sounds are normal. He exhibits no distension and no mass. There is no tenderness. There is no guarding.   Musculoskeletal: He exhibits no edema.   Neurological:   Intubated and sedated    Skin: Skin is warm and dry. He is not diaphoretic. No erythema.   Nursing note and vitals reviewed.      Results Review:  I have reviewed the labs, radiology results, and diagnostic studies.    Laboratory Data:   Results from last  7 days   Lab Units 06/15/19  0228 06/14/19  0349   WBC 10*3/mm3 13.92* 11.21*   HEMOGLOBIN g/dL 11.9* 12.9*   HEMATOCRIT % 35.2* 39.0*   PLATELETS 10*3/mm3 185 274        Results from last 7 days   Lab Units 06/15/19  0228 06/14/19  0914 06/14/19  0539 06/14/19  0349   SODIUM mmol/L 143 141  --  143   SODIUM, ARTERIAL mmol/L  --   --  141  --    POTASSIUM mmol/L 3.9 3.9  --  5.4*   CHLORIDE mmol/L 115* 110  --  110   CO2 mmol/L 20.0* 24.0  --  28.0   BUN mg/dL 17 26*  --  27*   CREATININE mg/dL 1.21 1.54*  --  1.70*   CALCIUM mg/dL 8.2* 8.9  --  8.9   BILIRUBIN mg/dL 0.7  --   --  0.3   ALK PHOS U/L 50  --   --  64   ALT (SGPT) U/L 48  --   --  35   AST (SGOT) U/L 50*  --   --  39   GLUCOSE mg/dL 68* 81  --  113*       Culture Data:        Radiology Data:   Imaging Results (last 24 hours)     Procedure Component Value Units Date/Time    XR Chest 1 View [[id number]] Collected:  06/15/19 0810     Updated:  06/15/19 0813    Narrative:       Frontal upright radiograph of the chest 6/15/2019 3:45 AM CDT     COMPARISON: 06/14/2019.     HISTORY: Patient intubated.     FINDINGS:   The lungs are clear. The cardiac silhouette is normal. Endotracheal tube  and nasogastric tube are satisfactorily position.      The osseous structures and surrounding soft tissues demonstrate no acute  abnormality.       Impression:       1. No radiographic evidence of acute cardiopulmonary process.        This report was finalized on 06/15/2019 08:10 by Dr. Adolph Echols MD.    XR Abdomen KUB [[id number]] Collected:  06/14/19 1313     Updated:  06/14/19 1317    Narrative:       XR ABDOMEN KUB- 6/14/2019 12:25 PM CDT     HISTORY: New OG needing to give meds via this route; G40.901-Epilepsy,  unspecified, not intractable, with status epilepticus; C71.9-Malignant  neoplasm of brain, unspecified       COMPARISON: 07/14/2017     FINDINGS:  There is a nonspecific bowel gas pattern. Nasogastric tube is present  the distal tip satisfactorily positioned  in the region of the body the  stomach. Endotracheal tube is present just proximal to the jolly.     No acute skeletal abnormality is identified.        Impression:       1. Nasogastric tube satisfactorily position distal tip region of the  body the stomach.         This report was finalized on 06/14/2019 13:14 by Dr. Adolph Echols MD.          I have reviewed the patient's current medications.     Assessment/Plan     Active Hospital Problems    Diagnosis   • Status epilepticus (CMS/HCC)   • Hyperkalemia   • Acute kidney injury (CMS/HCC) on chronic kidney disease stage 3   • Astrocytoma brain tumor (CMS/HCC)   • Seizures (CMS/HCC)       Plan:   1.  Continue ICU care   2.  Neurology, oncology, and neurosurgery consult  3.  IVF with normal saline, bolus then 100 mL/hr  4. Creatinine and potassium improved    5.  AEDs per neurology  6.  Continue TV on ventilator to 8mL/kg  7.  Daily ABG and CXR while intubated   8.  AM CBC and BMP   9.  CK was normal   10.  EEG:   Interpretation: This is an abnormal EEG recording secondary to slowing of the background rhythm and a  burst suppression pattern.  This may be seen as the result of metabolic and  medication related  encephalopathy.  No definitive seizure activity was noted.  Clinical correlation is recommended.      Addendum: A burst suppression pattern is the ideal goal for treating seizures.     11.  Continue OG  12.  Continue ramirez   13.  PPI for GI PPx (take PPI at home), enoxaparin for VTE prophylaxis  14.  Patient had ~4 episodes of seizure like activity in the last 24 hours, would not recommend weaning sedation significantly today.   15.  Overbreathing the ventilator    Discussed case with neurosurgery and oncology this AM.  Patients prognosis is very poor.                Discharge Planning: I expect the patient to be discharged to ? In ? Days.    Jairon Webb MD   06/15/19   8:26 AM

## 2019-06-15 NOTE — PLAN OF CARE
Problem: Patient Care Overview  Goal: Plan of Care Review   06/15/19 3236   OTHER   Outcome Summary Just ended 25 min sedation vacation with Dr. Parker at the bedside several times to assess she directed that we do a sedation vacation, and when the EEG tech arrives to turn off the sedation for as long as the pt could safely tolerate.

## 2019-06-15 NOTE — PLAN OF CARE
Problem: Patient Care Overview  Goal: Plan of Care Review   06/15/19 1810   OTHER   Outcome Summary Did not have to give Ativan today for seizure like activity. Dr. Parker had me pause sedation during the shift for about 25 minutes. Continuous EEG started around 1700, the tech will probably take it down around 5 tomorrow unless Dr. Parker decides to earlier. Pt has lots of secretions.

## 2019-06-15 NOTE — PLAN OF CARE
Problem: Patient Care Overview  Goal: Plan of Care Review   06/15/19 7629   OTHER   Outcome Summary Dr. Webb does not want sedation vacation at this time. Patient is getting Ativan during each shift for seizure like movement as well as the high rate of diprivan.

## 2019-06-15 NOTE — PLAN OF CARE
Problem: Patient Care Overview  Goal: Plan of Care Review   06/15/19 0750   OTHER   Outcome Summary Dr. Trevino at bedside does not want to turn sedation off due to seizure concerns at this time.

## 2019-06-16 ENCOUNTER — APPOINTMENT (OUTPATIENT)
Dept: GENERAL RADIOLOGY | Facility: HOSPITAL | Age: 54
End: 2019-06-16

## 2019-06-16 PROBLEM — D72.829 LEUKOCYTOSIS: Status: ACTIVE | Noted: 2019-06-16

## 2019-06-16 LAB
ANION GAP SERPL CALCULATED.3IONS-SCNC: 9 MMOL/L (ref 4–13)
BACTERIA UR QL AUTO: ABNORMAL /HPF
BILIRUB UR QL STRIP: NEGATIVE
BUN BLD-MCNC: 13 MG/DL (ref 5–21)
BUN/CREAT SERPL: 12.1 (ref 7–25)
CALCIUM SPEC-SCNC: 8.1 MG/DL (ref 8.4–10.4)
CHLORIDE SERPL-SCNC: 114 MMOL/L (ref 98–110)
CLARITY UR: CLEAR
CO2 SERPL-SCNC: 18 MMOL/L (ref 24–31)
COLOR UR: YELLOW
CREAT BLD-MCNC: 1.07 MG/DL (ref 0.5–1.4)
DEPRECATED RDW RBC AUTO: 46.6 FL (ref 40–54)
ERYTHROCYTE [DISTWIDTH] IN BLOOD BY AUTOMATED COUNT: 15.6 % (ref 12–15)
GFR SERPL CREATININE-BSD FRML MDRD: 72 ML/MIN/1.73
GLUCOSE BLD-MCNC: 101 MG/DL (ref 70–100)
GLUCOSE UR STRIP-MCNC: NEGATIVE MG/DL
HCT VFR BLD AUTO: 35.2 % (ref 40–52)
HGB BLD-MCNC: 11.8 G/DL (ref 14–18)
HGB UR QL STRIP.AUTO: ABNORMAL
HYALINE CASTS UR QL AUTO: ABNORMAL /LPF
KETONES UR QL STRIP: NEGATIVE
LEUKOCYTE ESTERASE UR QL STRIP.AUTO: NEGATIVE
MCH RBC QN AUTO: 28.3 PG (ref 28–32)
MCHC RBC AUTO-ENTMCNC: 33.5 G/DL (ref 33–36)
MCV RBC AUTO: 84.4 FL (ref 82–95)
NITRITE UR QL STRIP: NEGATIVE
PH UR STRIP.AUTO: 6 [PH] (ref 5–8)
PLATELET # BLD AUTO: 196 10*3/MM3 (ref 130–400)
PMV BLD AUTO: 11.1 FL (ref 6–12)
POTASSIUM BLD-SCNC: 3.3 MMOL/L (ref 3.5–5.3)
PROCALCITONIN SERPL-MCNC: <0.25 NG/ML
PROT UR QL STRIP: NEGATIVE
RBC # BLD AUTO: 4.17 10*6/MM3 (ref 4.8–5.9)
RBC # UR: ABNORMAL /HPF
REF LAB TEST METHOD: ABNORMAL
SODIUM BLD-SCNC: 141 MMOL/L (ref 135–145)
SP GR UR STRIP: 1.02 (ref 1–1.03)
SQUAMOUS #/AREA URNS HPF: ABNORMAL /HPF
UROBILINOGEN UR QL STRIP: ABNORMAL
VALPROATE SERPL-MCNC: 80.4 MCG/ML (ref 50–100)
WBC NRBC COR # BLD: 15.75 10*3/MM3 (ref 4.8–10.8)
WBC UR QL AUTO: ABNORMAL /HPF

## 2019-06-16 PROCEDURE — 71045 X-RAY EXAM CHEST 1 VIEW: CPT

## 2019-06-16 PROCEDURE — 25010000002 DEXAMETHASONE PER 1 MG: Performed by: NEUROLOGICAL SURGERY

## 2019-06-16 PROCEDURE — 94799 UNLISTED PULMONARY SVC/PX: CPT

## 2019-06-16 PROCEDURE — 99291 CRITICAL CARE FIRST HOUR: CPT | Performed by: PSYCHIATRY & NEUROLOGY

## 2019-06-16 PROCEDURE — 25010000002 ENOXAPARIN PER 10 MG: Performed by: INTERNAL MEDICINE

## 2019-06-16 PROCEDURE — 80164 ASSAY DIPROPYLACETIC ACD TOT: CPT | Performed by: PSYCHIATRY & NEUROLOGY

## 2019-06-16 PROCEDURE — 87205 SMEAR GRAM STAIN: CPT | Performed by: INTERNAL MEDICINE

## 2019-06-16 PROCEDURE — 25010000002 PIPERACILLIN SOD-TAZOBACTAM PER 1 G: Performed by: INTERNAL MEDICINE

## 2019-06-16 PROCEDURE — 94003 VENT MGMT INPAT SUBQ DAY: CPT

## 2019-06-16 PROCEDURE — 84145 PROCALCITONIN (PCT): CPT | Performed by: INTERNAL MEDICINE

## 2019-06-16 PROCEDURE — 99024 POSTOP FOLLOW-UP VISIT: CPT | Performed by: NEUROLOGICAL SURGERY

## 2019-06-16 PROCEDURE — 25010000002 VANCOMYCIN PER 500 MG: Performed by: INTERNAL MEDICINE

## 2019-06-16 PROCEDURE — 85027 COMPLETE CBC AUTOMATED: CPT | Performed by: INTERNAL MEDICINE

## 2019-06-16 PROCEDURE — 87070 CULTURE OTHR SPECIMN AEROBIC: CPT | Performed by: INTERNAL MEDICINE

## 2019-06-16 PROCEDURE — 25010000002 LEVETRIRACETAM PER 10 MG: Performed by: PSYCHIATRY & NEUROLOGY

## 2019-06-16 PROCEDURE — 80048 BASIC METABOLIC PNL TOTAL CA: CPT | Performed by: INTERNAL MEDICINE

## 2019-06-16 PROCEDURE — 25010000002 MAGNESIUM SULFATE 2 GM/50ML SOLUTION: Performed by: INTERNAL MEDICINE

## 2019-06-16 PROCEDURE — 81001 URINALYSIS AUTO W/SCOPE: CPT | Performed by: INTERNAL MEDICINE

## 2019-06-16 RX ORDER — DIVALPROEX SODIUM 500 MG/1
1000 TABLET, DELAYED RELEASE ORAL EVERY 8 HOURS SCHEDULED
Status: DISCONTINUED | OUTPATIENT
Start: 2019-06-16 | End: 2019-06-16

## 2019-06-16 RX ORDER — MAGNESIUM SULFATE HEPTAHYDRATE 40 MG/ML
2 INJECTION, SOLUTION INTRAVENOUS ONCE
Status: COMPLETED | OUTPATIENT
Start: 2019-06-16 | End: 2019-06-16

## 2019-06-16 RX ORDER — POTASSIUM CHLORIDE 20MEQ/15ML
40 LIQUID (ML) ORAL DAILY
Status: DISCONTINUED | OUTPATIENT
Start: 2019-06-16 | End: 2019-06-20 | Stop reason: HOSPADM

## 2019-06-16 RX ORDER — VANCOMYCIN HYDROCHLORIDE 1 G/200ML
1000 INJECTION, SOLUTION INTRAVENOUS EVERY 12 HOURS
Status: DISCONTINUED | OUTPATIENT
Start: 2019-06-16 | End: 2019-06-17

## 2019-06-16 RX ADMIN — CHLORHEXIDINE GLUCONATE 15 ML: 1.2 RINSE ORAL at 09:13

## 2019-06-16 RX ADMIN — PANTOPRAZOLE SODIUM 40 MG: 40 INJECTION, POWDER, FOR SOLUTION INTRAVENOUS at 06:30

## 2019-06-16 RX ADMIN — DEXAMETHASONE SODIUM PHOSPHATE 6 MG: 10 INJECTION INTRAMUSCULAR; INTRAVENOUS at 12:19

## 2019-06-16 RX ADMIN — DEXAMETHASONE SODIUM PHOSPHATE 6 MG: 10 INJECTION INTRAMUSCULAR; INTRAVENOUS at 00:54

## 2019-06-16 RX ADMIN — MAGNESIUM SULFATE HEPTAHYDRATE 2 G: 40 INJECTION, SOLUTION INTRAVENOUS at 09:12

## 2019-06-16 RX ADMIN — DEXAMETHASONE SODIUM PHOSPHATE 6 MG: 10 INJECTION INTRAMUSCULAR; INTRAVENOUS at 06:31

## 2019-06-16 RX ADMIN — SODIUM CHLORIDE 150 MG: 9 INJECTION, SOLUTION INTRAVENOUS at 13:10

## 2019-06-16 RX ADMIN — CHLORHEXIDINE GLUCONATE 15 ML: 1.2 RINSE ORAL at 21:14

## 2019-06-16 RX ADMIN — VALPROATE SODIUM 750 MG: 100 INJECTION, SOLUTION INTRAVENOUS at 21:14

## 2019-06-16 RX ADMIN — VANCOMYCIN HYDROCHLORIDE 1000 MG: 1 INJECTION, SOLUTION INTRAVENOUS at 21:21

## 2019-06-16 RX ADMIN — LEVETIRACETAM 1250 MG: 500 INJECTION, SOLUTION INTRAVENOUS at 21:11

## 2019-06-16 RX ADMIN — TAZOBACTAM SODIUM AND PIPERACILLIN SODIUM 4.5 G: 500; 4 INJECTION, SOLUTION INTRAVENOUS at 10:00

## 2019-06-16 RX ADMIN — ENOXAPARIN SODIUM 40 MG: 40 INJECTION SUBCUTANEOUS at 09:12

## 2019-06-16 RX ADMIN — VALPROIC ACID 750 MG: 250 SOLUTION ORAL at 12:19

## 2019-06-16 RX ADMIN — TAZOBACTAM SODIUM AND PIPERACILLIN SODIUM 4.5 G: 500; 4 INJECTION, SOLUTION INTRAVENOUS at 22:58

## 2019-06-16 RX ADMIN — SODIUM CHLORIDE 150 MG: 9 INJECTION, SOLUTION INTRAVENOUS at 02:19

## 2019-06-16 RX ADMIN — TAZOBACTAM SODIUM AND PIPERACILLIN SODIUM 4.5 G: 500; 4 INJECTION, SOLUTION INTRAVENOUS at 14:19

## 2019-06-16 RX ADMIN — DEXAMETHASONE SODIUM PHOSPHATE 6 MG: 10 INJECTION INTRAMUSCULAR; INTRAVENOUS at 17:52

## 2019-06-16 RX ADMIN — LEVETIRACETAM 1250 MG: 500 INJECTION, SOLUTION INTRAVENOUS at 09:12

## 2019-06-16 RX ADMIN — VALPROIC ACID 750 MG: 250 SOLUTION ORAL at 00:54

## 2019-06-16 RX ADMIN — SODIUM CHLORIDE, PRESERVATIVE FREE 3 ML: 5 INJECTION INTRAVENOUS at 21:15

## 2019-06-16 RX ADMIN — DEXAMETHASONE SODIUM PHOSPHATE 6 MG: 10 INJECTION INTRAMUSCULAR; INTRAVENOUS at 23:47

## 2019-06-16 RX ADMIN — VALPROIC ACID 750 MG: 250 SOLUTION ORAL at 06:30

## 2019-06-16 RX ADMIN — POTASSIUM CHLORIDE 40 MEQ: 20 SOLUTION ORAL at 10:01

## 2019-06-16 RX ADMIN — VANCOMYCIN HYDROCHLORIDE 1000 MG: 1 INJECTION, SOLUTION INTRAVENOUS at 09:12

## 2019-06-16 NOTE — PLAN OF CARE
Problem: Patient Care Overview  Goal: Plan of Care Review   06/16/19 1401   OTHER   Outcome Summary About an hour ago pt woke up and started following commands, he recognized his family and nodded appropriately. I called Dr. Parker and let her know then I called Dr. Webb and he said we could do weaning trials, the pt passed those and Dr. Webb was called again he said to extubate pt. I spoke with Ivonne his mother before this process as Dr. Webb requested and she said we will re intubate if needed.

## 2019-06-16 NOTE — PLAN OF CARE
Problem: Patient Care Overview  Goal: Plan of Care Review   06/16/19 0736   OTHER   Outcome Summary No seizure like activity this shift. Sedation has been off since 1700 previous shift. Pt will not follow commands. Does move R side extremities. L side only moves in response to painful stimuli. Pt charted sepsis + BC, sputum, and UA collected. Ax started, rocephin and zithromycin started.      Goal: Individualization and Mutuality  Outcome: Ongoing (interventions implemented as appropriate)    Goal: Discharge Needs Assessment  Outcome: Ongoing (interventions implemented as appropriate)    Goal: Interprofessional Rounds/Family Conf  Outcome: Ongoing (interventions implemented as appropriate)      Problem: Fall Risk (Adult)  Goal: Identify Related Risk Factors and Signs and Symptoms  Outcome: Ongoing (interventions implemented as appropriate)    Goal: Absence of Fall  Outcome: Ongoing (interventions implemented as appropriate)      Problem: Restraint, Nonbehavioral (Nonviolent)  Goal: Rationale and Justification  Outcome: Ongoing (interventions implemented as appropriate)    Goal: Nonbehavioral (Nonviolent) Restraint: Absence of Injury/Harm  Outcome: Ongoing (interventions implemented as appropriate)    Goal: Nonbehavioral (Nonviolent) Restraint: Achievement of Discontinuation Criteria  Outcome: Ongoing (interventions implemented as appropriate)    Goal: Nonbehavioral (Nonviolent) Restraint: Preservation of Dignity and Wellbeing  Outcome: Ongoing (interventions implemented as appropriate)      Problem: Seizure Disorder/Epilepsy (Adult)  Goal: Signs and Symptoms of Listed Potential Problems Will be Absent, Minimized or Managed (Seizure Disorder/Epilepsy)  Outcome: Ongoing (interventions implemented as appropriate)      Problem: Skin Injury Risk (Adult)  Goal: Identify Related Risk Factors and Signs and Symptoms  Outcome: Ongoing (interventions implemented as appropriate)    Goal: Skin Health and Integrity  Outcome:  Ongoing (interventions implemented as appropriate)

## 2019-06-16 NOTE — PROGRESS NOTES
PROGRESS NOTE  Patient name: Lukasz Savage  Patient : 1965  VISIT # [id number]  MR #[id number]   CCU 9    SUBJECTIVE:  He continues in CCU.  Propofol weaned - OFF at 5 PM last night without overt seizure activity.    INTERVAL HISTORY:  Mr. 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  He presented to the hospital with seizure activity and was admitted for treatment.  Medical oncology consultation was requested for continuity of care.     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 Trevino for diagnosis of primary brain tumor, grade 3 astrocytoma. The patient was being seen by neurology with complaints of left facial and upper extremity.     · 2019-MRI brain with contrast showed changes in the frontal convexity with a fullness of the sulcal gyral pattern. Specifically, 4.5 x 4.5 x 3.5 cm right frontal lesion. Second, discrete hyperintense nodule measuring 1.1 x 1.9 x 1.5 cm in the subcortical white matter of the paramedian posterior right frontal lobe immediately above the corpus callosum. This was concerning for high-grade glioma.   · 4/15/2019-he underwent a craniotomy with stereotactic biopsy by Dr. Dillon Trevino at [facility]. 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 Further molecular analysis at [facility] revealed IDH1/2 wild type, MGMT non-methylated, TERT mutation. Complex cytogenetics changes A maximum safe resection was not possible due to concerns of significant sequela. Second opinion was recommended at the [facility].   · 2019-he was seen by Dr. Oral Jessica. Recommended concurrent chemoradiation.    · 5/24/2019-he was first seen by me. Recommended concurrent chemoradiation with Temodar.     REVIEW OF SYSTEMS:    Constitutional: no fever                      Lungs: positive intubation  CVS: no palpitation, no chest pain  GI: no abdominal pain, no nausea , no vomiting  DIPAK: no dysuria, frequency and urgency, no hematuria  Musculoskeletal: no joint pain, swelling , stiffness  Endocrine: no polyuria, polydipsia  Hematology: no anemia, no easy brusing or bleeding  Dermatology: no skin rash, no eczema, no pruritus  Neurology: positive for anaplastic glioma, seizures      OBJECTIVE:    Vitals:    06/16/19 0405   BP: 128/90   Pulse: 81   Resp: 17   Temp: 98.7 °F (37.1 °C)   SpO2: 98%       Intake/Output Summary (Last 24 hours) at 6/16/2019 0710  Last data filed at 6/15/2019 2000  Gross per 24 hour   Intake 2100 ml   Output 1125 ml   Net 975 ml     PHYSICAL EXAM:    CONSTITUTIONAL: Intubated,  in CCU   NECK: Supple, no masses   CHEST/LUNGS: CTA bilaterally, normal respiratory effort   CARDIOVASCULAR: RRR, no murmurs  ABDOMEN: soft non-tender, active bowel sounds, no HSM  EXTREMITIES: BLE SCD's  SKIN: warm, dry with no rashes or lesions      CBC  Results from last 7 days   Lab Units 06/16/19  0148 06/15/19  0228 06/14/19  0349   WBC 10*3/mm3 15.75* 13.92* 11.21*   HEMOGLOBIN g/dL 11.8* 11.9* 12.9*   HEMATOCRIT % 35.2* 35.2* 39.0*   PLATELETS 10*3/mm3 196 185 274         Lab Results   Component Value Date     06/16/2019    K 3.3 (L) 06/16/2019     (H) 06/16/2019    CO2 18.0 (L) 06/16/2019    BUN 13 06/16/2019    CREATININE 1.07 06/16/2019    GLUCOSE 101 (H) 06/16/2019    CALCIUM 8.1 (L) 06/16/2019    BILITOT 0.7 06/15/2019    ALKPHOS 50 06/15/2019    AST 50 (H) 06/15/2019    ALT 48 06/15/2019    AGRATIO 1.1 06/15/2019    GLOB 2.4 06/15/2019       Lab Results   Component Value Date    INR 0.98 06/03/2019    INR 0.94 03/22/2018    PROTIME 13.3 06/03/2019    PROTIME 12.8 03/22/2018       Cultures:    No results  found for: BLOODCX  No components found for: URINCX    CT HEAD WO CONTRAST-  6/14/2019 7:55 AM CDT     HISTORY: Seizures, history of brain tumor previous right-sided frontal  craniotomy.     COMPARISONS: 06/03/2019 head CT      TECHNIQUE:     Radiation dose equals  mGy-cm.  Automated exposure control dose  reduction technique was implemented.        CT evaluation of the head without intravenous contrast. 5 mm transaxial  images were obtained.   2-D sagittal and coronal reconstruction images  were generated.     FINDINGS: Examination was sent to statrad for preliminary  interpretation.     Changes from right craniotomy observed.     The right sided high density lesion is again observed superiorly in the  right frontal lobe, near the midline, extending from the corpus callosum  was noted previously and is essentially unchanged. Second probable high  density lesion observed more laterally in the right frontal lobe near  the craniotomy defect. The CT appearance is most likely unchanged.     There is no mass effect or midline shift.     There is no hydrocephalus.     There is no developing hemorrhage.     Mucosal thickening observed in the ethmoid sinuses.           IMPRESSION:  1. Stable CT appearance of the head compared to 06/03/2019, as described  above.                              ASSESSMENT/PLAN:  Anaplastic glioma     He has completed 2 XRT treatment and did take Temodar for 2 days -- 2/12 and 2/13.  Temodar on hold at present     CBC today 6/16/2019  WBC 15.75  HGB 11.8  ,000        Seizure activity     CCU monitoring  Vimpat  Depakenmahsa Pachecoppra     Intubated in CCU     Propofol was weaned off at 1700 hrs. on 6/15/2019.  No overt seizure activity on EEG.  Possible MRI today.  He is moving on the right side but not the left.  He is not responding to commands at present.       Goals of care- Dr. Doyle discussed with the family on 6/15/2019.  The family is not ready to make the patient DNR at this  time. We discussed about the futility of sustaining life-support for too long if seizures does not improve. They acknowledged understanding but would like to provide full support for now.     YEVGENIY Hammond    06/16/19  7:10 AM

## 2019-06-16 NOTE — PROGRESS NOTES
[facility] Medicine [facility]  INPATIENT PROGRESS NOTE    Patient Name: Lukasz Savage  Date of Admission: 6/14/2019  Today's Date: 06/16/19  Length of Stay: 2  Primary Care Physician: Linda Hoffman, DNP, APRN    Subjective   Chief Complaint: worsening sputum production  HPI     Patient was seen and examined at bedside.  Patient moves his right extremities spontaneously, does not do much with his left side, his left lower extremity moves weakly, his left upper extremity does not move to painful stimuli.  Patient is significantly overbreathing the vent and is appearing breast very restless.  He does not open his eyes and he does not follow commands.  The patient has been off sedation since yesterday.  Overnight, the patient was noted to have worsening sputum production, and a worsening leukocytosis.  Cultures have been obtained.  Antibiotics of azithromycin and ceftriaxone have been administered, I have changed these to vancomycin and Zosyn.  Patient has very poor prognosis, we will continue discussions with the family.        Review of Systems   Unable to perform ROS: Intubated        All pertinent negatives and positives are as above. All other systems have been reviewed and are negative unless otherwise stated.     Objective    Temp:  [98 °F (36.7 °C)-98.7 °F (37.1 °C)] 98.7 °F (37.1 °C)  Heart Rate:  [70-96] 77  Resp:  [17-26] 19  BP: ()/() 124/95  FiO2 (%):  [30 %] 30 %  Physical Exam  Constitutional: No distress. He is intubated.  HENT:   Head: Normocephalic and atraumatic.   Eyes: Conjunctivae are normal. No scleral icterus.   Neck: Neck supple. No JVD present.   Cardiovascular: Normal rate and regular rhythm. Exam reveals no gallop and no friction rub.   No murmur heard.  Pulmonary/Chest: Effort normal and breath sounds normal. No stridor. He is intubated. No respiratory distress. He has no wheezes. He has no rales. Adequate vent synchrony.   Overbreathing the ventilator significantly   Abdominal: Soft. Bowel sounds are normal. He exhibits no distension and no mass. There is no tenderness. There is no guarding.   Musculoskeletal: He exhibits no edema.   Neurological:   Intubated.  Does not move left upper extremity to painful stimuli.  Left upper extremity moves weakly to painful stimuli.  Right upper and lower extremities move spontaneously  Skin: Skin is warm and dry. He is not diaphoretic. No erythema.   Nursing note and vitals reviewed.          Results Review:  I have reviewed the labs, radiology results, and diagnostic studies.    Laboratory Data:   Results from last 7 days   Lab Units 06/16/19  0148 06/15/19  0228 06/14/19  0349   WBC 10*3/mm3 15.75* 13.92* 11.21*   HEMOGLOBIN g/dL 11.8* 11.9* 12.9*   HEMATOCRIT % 35.2* 35.2* 39.0*   PLATELETS 10*3/mm3 196 185 274        Results from last 7 days   Lab Units 06/16/19  0148 06/15/19  0228 06/14/19  0914  06/14/19  0349   SODIUM mmol/L 141 143 141  --  143   SODIUM, ARTERIAL   --   --   --    < >  --    POTASSIUM mmol/L 3.3* 3.9 3.9  --  5.4*   CHLORIDE mmol/L 114* 115* 110  --  110   CO2 mmol/L 18.0* 20.0* 24.0  --  28.0   BUN mg/dL 13 17 26*  --  27*   CREATININE mg/dL 1.07 1.21 1.54*  --  1.70*   CALCIUM mg/dL 8.1* 8.2* 8.9  --  8.9   BILIRUBIN mg/dL  --  0.7  --   --  0.3   ALK PHOS U/L  --  50  --   --  64   ALT (SGPT) U/L  --  48  --   --  35   AST (SGOT) U/L  --  50*  --   --  39   GLUCOSE mg/dL 101* 68* 81  --  113*    < > = values in this interval not displayed.       Culture Data:        Radiology Data:   Imaging Results (last 24 hours)     Procedure Component Value Units Date/Time    XR Chest 1 View [[id number]] Updated:  06/16/19 0419    XR Chest 1 View [[id number]] Collected:  06/15/19 0810     Updated:  06/15/19 0813    Narrative:       Frontal upright radiograph of the chest 6/15/2019 3:45 AM CDT     COMPARISON: 06/14/2019.     HISTORY: Patient intubated.     FINDINGS:   The lungs are  clear. The cardiac silhouette is normal. Endotracheal tube  and nasogastric tube are satisfactorily position.      The osseous structures and surrounding soft tissues demonstrate no acute  abnormality.       Impression:       1. No radiographic evidence of acute cardiopulmonary process.        This report was finalized on 06/15/2019 08:10 by Dr. Adolph Echols MD.          I have reviewed the patient's current medications.     Assessment/Plan     Active Hospital Problems    Diagnosis   • Leukocytosis   • Status epilepticus (CMS/HCC)   • Hyperkalemia   • Acute kidney injury (CMS/HCC) on chronic kidney disease stage 3   • Astrocytoma brain tumor (CMS/HCC)   • Seizures (CMS/HCC)       Plan:  1.  Continue ICU care   2.  Neurology, oncology, and neurosurgery consulted.  Appreciate assitance  3.  IVF   4.  Replace potassium 40 meq PO and 2 gm magnesium   5.  AEDs per neurology  6.  Continue TV on ventilator to 8mL/kg, Plateau pressure 19; May increase Vmax as patient seems to wanting more flow   7.  Daily ABG and CXR while intubated   8.  AM CBC and BMP   9.  Continue OG; Continue ramirez   10.  PPI for GI PPx (take PPI at home), enoxaparin for VTE prophylaxis  14.  Not moving left upper extremity; responds minimally to pain on the left lower extremity   15.  Overbreathing the ventilator significantly with increased work of breathing when not having sedation   16.  BC x 2, sputum culture pending  17.  UA unremarkable  18.  Decadron 6 mg IV q6h   19.  Vanc and zosyn for now given recent hospitalizations   20.  Leukocytosis worsening and sputum has worsened          Addendum:   Wean ventilator.  Patient extubated to room air.               Discharge Planning: I expect the patient to be discharged to ? In ? Days.      Jairon Webb MD   06/16/19   7:49 AM

## 2019-06-16 NOTE — PROGRESS NOTES
"Pharmacy Dosing Service  Pharmacokinetics  Vancomycin Initial Evaluation    Assessment/Action/Plan:  Initiated Vancomycin 1000 mg IVPB every 12 hours. Vancomycin levels not ordered at this time. . Current vancomycin end date: 6/22/19. Pharmacy will monitor renal function and adjust dose accordingly.     Subjective:  Lukasz Savage is a 54 y.o. male with a Vancomycin \"Pharmacy to Dose\" consult for the treatment of Pneumonia .    Objective:  Ht: 172.7 cm (68\"); Wt: 82.8 kg (182 lb 9.6 oz)  Estimated Creatinine Clearance: 83.8 mL/min (by C-G formula based on SCr of 1.07 mg/dL).   Lab Results   Component Value Date    CREATININE 1.07 06/16/2019    CREATININE 1.21 06/15/2019    CREATININE 1.54 (H) 06/14/2019    CREATININE 1.50 (H) 04/02/2019    CREATININE 1.6 (H) 06/29/2018      Lab Results   Component Value Date    WBC 15.75 (H) 06/16/2019    WBC 13.92 (H) 06/15/2019    WBC 11.21 (H) 06/14/2019      Baseline culture results:  Microbiology Results (last 10 days)       ** No results found for the last 240 hours. **          Antimicrobials:  Anti-Infectives (From admission, onward)      Ordered     Dose/Rate Route Frequency Start Stop    06/16/19 0738  piperacillin-tazobactam (ZOSYN) 4.5 g in iso-osmotic dextrose 100 mL IVPB (premix)     Ordering Provider:  Jairon Webb MD    4.5 g  over 4 Hours Intravenous Every 8 Hours 06/16/19 1400 06/23/19 1359    06/16/19 0755  vancomycin (VANCOCIN) in iso-osmotic dextrose IVPB 1 g (premix) 200 mL     Ordering Provider:  Jairon Webb MD    1,000 mg Intravenous Every 12 Hours 06/16/19 0900 06/23/19 0859    06/16/19 0738  piperacillin-tazobactam (ZOSYN) 4.5 g in iso-osmotic dextrose 100 mL IVPB (premix)     Ordering Provider:  Jairon Webb MD    4.5 g  over 30 Minutes Intravenous Once 06/16/19 0830      06/16/19 0738  Pharmacy to dose vancomycin     Ordering Provider:  Jairon Webb MD     Does not apply Continuous PRN 06/16/19 0737 06/23/19 0736      "       Art Bethea, PharmD  06/16/19 7:55 AM

## 2019-06-16 NOTE — PROGRESS NOTES
"Progress Note    Patient:  Lukasz Savage  YOB: 1965  MRN: [id number]   Admit date: 6/14/2019   Admitting Physician: Jairon Webb MD  Primary Care Physician: Linda Hoffman, DNP, APRN    Chief Complaint/Interval History: Propofol weaned overnight.  Long-term EEG monitoring started.  No overt seizure activity reported by nursing staff overnight    Intake/Output Summary (Last 24 hours) at 6/16/2019 0755  Last data filed at 6/16/2019 0400  Gross per 24 hour   Intake 2100 ml   Output 1650 ml   Net 450 ml     Allergies:   Allergies   Allergen Reactions   • Lipitor [Atorvastatin] Rash   • Lisinopril Angioedema     Swell tongue     Current Scheduled Medications:     chlorhexidine 15 mL Mouth/Throat Q12H   dexamethasone 6 mg Intravenous Q6H   enoxaparin 40 mg Subcutaneous Q24H   lacosamide (VIMPAT) IVPB 150 mg Intravenous Q12H   levETIRAcetam 1,250 mg Intravenous Q12H   magnesium sulfate 2 g Intravenous Once   pantoprazole 40 mg Intravenous Q AM   piperacillin-tazobactam 4.5 g Intravenous Once   piperacillin-tazobactam 4.5 g Intravenous Q8H   potassium chloride 40 mEq Oral Daily   sodium chloride 3 mL Intravenous Q12H   Valproic Acid 750 mg Per G Tube Q6H   vancomycin 1,000 mg Intravenous Q12H     Current PRN Medications:  •  acetaminophen **OR** acetaminophen  •  LORazepam  •  ondansetron  •  Pharmacy to dose vancomycin  •  sodium chloride    Review of Systems   Neurological: Positive for seizures.   All other systems reviewed and are negative.      Vital Signs:  /95   Pulse 77   Temp 98.7 °F (37.1 °C) (Axillary)   Resp 19   Ht 172.7 cm (68\")   Wt 82.8 kg (182 lb 9.6 oz)   SpO2 98%   BMI 27.76 kg/m²     Physical Exam   Constitutional: He appears well-developed and well-nourished.   Cardiovascular: Normal rate, regular rhythm and normal heart sounds.   Pulmonary/Chest: Effort normal and breath sounds normal. No respiratory distress.   Abdominal: Soft. He exhibits no distension. " "There is no tenderness.     Vital signs reviewed.  Line/IV site: No erythema, warmth, induration, or tenderness.    Objective:  Vital signs: (most recent): Blood pressure 124/95, pulse 77, temperature 98.7 °F (37.1 °C), temperature source Axillary, resp. rate 19, height 172.7 cm (68\"), weight 82.8 kg (182 lb 9.6 oz), SpO2 98 %.    Lungs:  Normal effort.  He is not in respiratory distress.    Heart: Normal rate.  Regular rhythm.    Abdomen: Abdomen is soft and non-distended.        Neurologic Exam  Opens eyes to voice.  Roving eye movements.  Pupils equal round reactive to light  Moving right upper and lower extremity purposely but not following commands  Some spontaneous movement with left side, increased tone, response to noxious stimulus but not following commands  Lab Results:  ABG: Results from last 7 days   Lab Units 06/15/19  2325 06/15/19  0350 06/14/19  0539   PH, ARTERIAL pH units 7.488* 7.482* 7.467*   PCO2, ARTERIAL mm Hg 24.5* 26.3* 27.0*   PO2 ART mm Hg 121.0* 112.0* 135.0*   VENTILATOR MODE  AC AC AC     CBC: Results from last 7 days   Lab Units 06/16/19  0148 06/15/19  0228 06/14/19  0349   WBC 10*3/mm3 15.75* 13.92* 11.21*   HEMOGLOBIN g/dL 11.8* 11.9* 12.9*   HEMATOCRIT % 35.2* 35.2* 39.0*   PLATELETS 10*3/mm3 196 185 274     BMP:  Results from last 7 days   Lab Units 06/16/19  0148 06/15/19  0228 06/14/19  0914 06/14/19  0539 06/14/19  0349   SODIUM mmol/L 141 143 141  --  143   SODIUM, ARTERIAL mmol/L  --   --   --  141  --    POTASSIUM mmol/L 3.3* 3.9 3.9  --  5.4*   CHLORIDE mmol/L 114* 115* 110  --  110   CO2 mmol/L 18.0* 20.0* 24.0  --  28.0   BUN mg/dL 13 17 26*  --  27*   CREATININE mg/dL 1.07 1.21 1.54*  --  1.70*   GLUCOSE mg/dL 101* 68* 81  --  113*   CALCIUM mg/dL 8.1* 8.2* 8.9  --  8.9   ALT (SGPT) U/L  --  48  --   --  35     Culture Results:        Seizures (CMS/HCC)    Astrocytoma brain tumor (CMS/HCC)    Status epilepticus (CMS/HCC)    Hyperkalemia    Acute kidney injury (CMS/HCC) " on chronic kidney disease stage 3    Leukocytosis      Radiology:     Additional Studies Reviewed:     Impression/Recommendations:   CV: Heart rate blood pressure stable   RESP:oxygenating and ventilating well    GI: N.p.o.  : BUN/creatinine urine output adequate  NEURO: More alert off propofol.  Still not following commands.  Left side less spontaneous and weaker than right consistent with postictal state.  Awaiting interpretation of long-term EEG monitoring.  I did have an extensive discussion with the patient's mother.  All her questions and concerns were addressed.  I do feel that this is all seizure related neurologic change.  I think given the proper supportive care and control of the seizures his neurologic exam should improve although it may take several days.  ID:  Dillon Trevino MD

## 2019-06-16 NOTE — PROGRESS NOTES
Neurology Progress Note      Date of admission: 6/14/2019  3:46 AM  Date of visit: 6/16/2019    Chief Complaint:  F/u Seizure    Subjective     Subjective:  Patient is extubated today.  He still not moving his left upper extremity.  He was not moving his left lower extremity but has now started to.  He is talking but his words are dysarthric.  He can name of who his mother is in the daughter and answering questions but it is difficult to understand.    Mother now provides a history and I have not been able to run into her before the daughter apparently did not have this all of this information.  He had taken Temodar 150 mg  Wednesday and Thursday that is the day before and the day of admission.  He had radiation therapy to his brain on Tuesday and Wednesday that is 2 days and the day before admission.      That evening of admission he had thrown up in the emesis was starting to go back down his throat so the mother helped sit him up he rolled off the sofa onto the floor he was rolling around the floor she believes trying to get to a place where he could help himself up then he just passed out came unresponsive.  She did not note any seizure-like activity EMS were called.  Apparently on route the EMS witnessed seizure and gave him Valium    Medications:  Current Facility-Administered Medications   Medication Dose Route Frequency Provider Last Rate Last Dose   • acetaminophen (TYLENOL) tablet 650 mg  650 mg Oral Q4H PRN Robe Dorsey DO        Or   • acetaminophen (TYLENOL) suppository 650 mg  650 mg Rectal Q4H PRN Robe Dorsey DO       • chlorhexidine (PERIDEX) 0.12 % solution 15 mL  15 mL Mouth/Throat Q12H Robe Dorsey DO   15 mL at 06/16/19 0913   • dexamethasone (DECADRON) injection 6 mg  6 mg Intravenous Q6H Dillon Trevino MD   6 mg at 06/16/19 1219   • enoxaparin (LOVENOX) syringe 40 mg  40 mg Subcutaneous Q24H Jairon Webb MD   40 mg at 06/16/19 0912   • lacosamide (VIMPAT) 150  mg in sodium chloride 0.9 % 50 mL IVPB  150 mg Intravenous Q12H Doreen Zuniga  mL/hr at 06/16/19 1310 150 mg at 06/16/19 1310   • levETIRAcetam (KEPPRA) 1,250 mg in sodium chloride 0.9 % 100 mL IVPB  1,250 mg Intravenous Q12H Doreen Zuniga MD   1,250 mg at 06/16/19 0912   • LORazepam (ATIVAN) injection 1 mg  1 mg Intravenous Q5 Min PRN Jairon Webb MD   1 mg at 06/15/19 0135   • ondansetron (ZOFRAN) injection 4 mg  4 mg Intravenous Q6H PRN Robe Dorsey DO       • pantoprazole (PROTONIX) injection 40 mg  40 mg Intravenous Q AM Dillon Trevino MD   40 mg at 06/16/19 0630   • piperacillin-tazobactam (ZOSYN) 4.5 g in iso-osmotic dextrose 100 mL IVPB (premix)  4.5 g Intravenous Q8H Jairon Webb MD   4.5 g at 06/16/19 1419   • potassium chloride (KAYCIEL) 20 MEQ/15ML (10%) solution 40 mEq  40 mEq Oral Daily Jairon Webb MD   40 mEq at 06/16/19 1001   • sodium chloride 0.9 % flush 3 mL  3 mL Intravenous Q12H Robe Dorsey DO   3 mL at 06/15/19 2224   • sodium chloride 0.9 % flush 3-10 mL  3-10 mL Intravenous PRN Robe Dorsey DO       • sodium chloride 0.9 % infusion  100 mL/hr Intravenous Continuous Jairon Webb  mL/hr at 06/15/19 1941 100 mL/hr at 06/15/19 1941   • Valproic Acid (DEPAKENE) syrup 750 mg  750 mg Per G Tube Q6H Doreen Zuniga MD   750 mg at 06/16/19 1219   • vancomycin (VANCOCIN) in iso-osmotic dextrose IVPB 1 g (premix) 200 mL  1,000 mg Intravenous Q12H Jairon Webb MD   1,000 mg at 06/16/19 0912       Review of Systems:   -A 14 point review of systems is completed and is negative except for dysarthria and inability to move his left arm  Objective     Objective      Vital Signs  Temp:  [98 °F (36.7 °C)-98.7 °F (37.1 °C)] 98.6 °F (37 °C)  Heart Rate:  [68-96] 84  Resp:  [17-26] 19  BP: (111-164)/() 145/87  FiO2 (%):  [30 %] 30 %    Physical Exam:    HEENT:  Neck supple  CVS:  Regular rate and rhythm.  No  murmurs  Carotid Examination:  No bruits  Lungs:  Clear to auscultation  Abdomen:  Non-tender, Non-distended  Extremities:  No signs of peripheral edema    Neurologic Exam:    -Awake, Alert, very dysarthric and difficult to understand  -Follows simple commands    Cranial nerves II through XII intact.  Pupils react.  Extraocular muscles are intact.  There is no facial motor asymmetry.  Hearing is intact to voice.  He is able to turn his head to the right and to the left.  Tongue protrudes midline.    Motor: (strength out of 5:  1= minimal movement, 2 = movement in plane of gravity, 3 = movement against gravity, 4 = movement against some resistance, 5 = full strength)      He moves the right upper and bilateral lower extremities to command and spontaneously.  He is moving the left leg less than the right leg.  No overt weakness was noted but suboptimal effort for strength testing occurred.  The left upper extremity is flaccid.    DTR:  Reflexes are quite brisk.  Plantars are flexor    Sensory:  -Intact to light touch but at this point time cannot determine if there is an asymmetry    Coordination/Gait:  -No ataxia but evaluation is not adequate to fully assess given his state     Results Review:    I reviewed the patient's new clinical results.    Lab Results (last 24 hours)     Procedure Component Value Units Date/Time    Procalcitonin [[id number]]  (Normal) Collected:  06/16/19 0148    Specimen:  Blood Updated:  06/16/19 0839     Procalcitonin <0.25 ng/mL     Narrative:       SIRS, sepsis, severe sepsis, and septic shock are categorized according to the criteria of the consensus conference of the American College of Chest Physicians/Society of Critical Care Medicine.    PCT < 0.5 ng/mL     Systemic infection (sepsis) is not likely.    PCT >0.5 and < 2.0 ng/mL Systemic infection (sepsis) is possible, but other conditions are known to elevate PCT as well.    PCT > 2.0 ng/mL     Systemic infection (sepsis) is likely,  unless other causes are known.      PCT > 10.0 ng/mL    Important systemic inflammatory response, almost exclusively due to severe bacterial sepsis or septic shock.    PCT values of < 0.5 ng/mL do not exclude an infection, because localized infections (without systemic signs) may be associated with such low concentrations, or a systemic infection in its initial stages (<6 hours).  Increased PCT can occur without infection.  PCT concentrations between 0.5 and 2.0 ng/mL should be interpreted taking into account the patients history.  It is recommended to retest PCT within 6-24 hours if any concentrations < 2.0 ng/mL are obtained.    Basic Metabolic Panel [[id number]]  (Abnormal) Collected:  06/16/19 0148    Specimen:  Blood Updated:  06/16/19 0256     Glucose 101 mg/dL      BUN 13 mg/dL      Creatinine 1.07 mg/dL      Sodium 141 mmol/L      Potassium 3.3 mmol/L      Chloride 114 mmol/L      CO2 18.0 mmol/L      Calcium 8.1 mg/dL      eGFR Non African Amer 72 mL/min/1.73      BUN/Creatinine Ratio 12.1     Anion Gap 9.0 mmol/L     Narrative:       GFR Normal >60  Chronic Kidney Disease <60  Kidney Failure <15    Valproic Acid Level, Total [[id number]]  (Normal) Collected:  06/16/19 0148    Specimen:  Blood Updated:  06/16/19 0250     Valproic Acid 80.4 mcg/mL     CBC (No Diff) [[id number]]  (Abnormal) Collected:  06/16/19 0148    Specimen:  Blood Updated:  06/16/19 0237     WBC 15.75 10*3/mm3      RBC 4.17 10*6/mm3      Hemoglobin 11.8 g/dL      Hematocrit 35.2 %      MCV 84.4 fL      MCH 28.3 pg      MCHC 33.5 g/dL      RDW 15.6 %      RDW-SD 46.6 fl      MPV 11.1 fL      Platelets 196 10*3/mm3     Urinalysis, Microscopic Only - Urine, Catheter [[id number]]  (Abnormal) Collected:  06/16/19 0008    Specimen:  Urine, Catheter Updated:  06/16/19 0019     RBC, UA Too Numerous to Count /HPF      WBC, UA 0-2 /HPF      Bacteria, UA None Seen /HPF      Squamous Epithelial Cells, UA 0-2 /HPF      Hyaline Casts, UA None Seen /LPF       Methodology Automated Microscopy    Urinalysis With Culture If Indicated - Urine, Catheter [[id number]]  (Abnormal) Collected:  06/16/19 0008    Specimen:  Urine, Catheter Updated:  06/16/19 0019     Color, UA Yellow     Appearance, UA Clear     pH, UA 6.0     Specific Gravity, UA 1.017     Glucose, UA Negative     Ketones, UA Negative     Bilirubin, UA Negative     Blood, UA Large (3+)     Protein, UA Negative     Leuk Esterase, UA Negative     Nitrite, UA Negative     Urobilinogen, UA 1.0 E.U./dL    Blood Gas, Arterial [[id number]]  (Abnormal) Collected:  06/15/19 2325    Specimen:  Arterial Blood Updated:  06/15/19 2329     Site Right Radial     Gera's Test Positive     pH, Arterial 7.488 pH units      Comment: 83 Value above reference range        pCO2, Arterial 24.5 mm Hg      Comment: 84 Value below reference range        pO2, Arterial 121.0 mm Hg      Comment: 83 Value above reference range        HCO3, Arterial 18.6 mmol/L      Comment: 84 Value below reference range        Base Excess, Arterial -3.2 mmol/L      Comment: 84 Value below reference range        O2 Saturation, Arterial 98.8 %      Temperature 37.0 C      Barometric Pressure for Blood Gas 747 mmHg      Modality Ventilator     FIO2 30 %      Ventilator Mode AC     Set Tidal Volume 540     Set Mech Resp Rate 12.0     PEEP 5.0     Collected by [id number]     Comment: Meter: [id number]     :  [id number]       Lactic Acid, Plasma [[id number]]  (Normal) Collected:  06/15/19 2206    Specimen:  Blood Updated:  06/15/19 2229     Lactate 0.7 mmol/L     Blood Culture - Blood, Arm, Left [[id number]] Collected:  06/15/19 2206    Specimen:  Blood from Arm, Left Updated:  06/15/19 2216        Imaging Results (last 24 hours)     Procedure Component Value Units Date/Time    XR Chest 1 View [[id number]] Collected:  06/16/19 0759     Updated:  06/16/19 0803    Narrative:       Frontal upright radiograph of the chest 6/16/2019 3:49 AM CDT     COMPARISON:  06/15/2019.     FINDINGS:   The lungs are clear. The cardiac silhouettes upper limits of normal.  Nasogastric tube and endotracheal tube are satisfactorily position.      The osseous structures and surrounding soft tissues demonstrate no acute  abnormality.       Impression:       1. No radiographic evidence of acute cardiopulmonary process.        This report was finalized on 06/16/2019 08:00 by Dr. Adolph Echols MD.          Assessment/Plan     Hospital Problem List      Seizures (CMS/HCC)    Astrocytoma brain tumor (CMS/HCC)    Status epilepticus (CMS/HCC)    Hyperkalemia    Acute kidney injury (CMS/HCC) on chronic kidney disease stage 3    Leukocytosis    Impression:  1.  Reported seizures is not clear if the syncopal and unresponsiveness episode was related to seizure.  Her mother who witnessed this there were no seizures however EMS reported seizures apparently seizures in the ER-- what is interesting is that all his EEG is not a single epileptiform discharge has been found  2.  Left upper extremity flaccid, left lower extremity weak.  This may be a Melchor's except we just do not have a good enough description to say he sees that long.  The other issue is that once he was given steroids he seems to be doing better  3. Grade 3 anaplastic astrocytoma, inoperable.  Status post radiation x2 this past week status post chemotherapy x2 with Temodar 150 mg  4.  H/O untreated sleep apnea --- this can lower seizure threshold.  He does need to have this addressed as an outpatient    Plan:    · Continue on current antiepileptic medications.    · Will need to closely monitor Depakote  · Change Depakene to Depakote  · Change IV Keppra to oral    30 minutes of critical care time was performed ,during this time I consulted with the nursing staff and also with other providers in regard to the patient's care. I talked with the family about test results, answered their questions.    Doreen Parker MD  06/16/19  3:22  PM

## 2019-06-16 NOTE — PROGRESS NOTES
"Progress Note    Patient:  Lukasz Savage  YOB: 1965  MRN: [id number]   Admit date: 6/14/2019   Admitting Physician: Jairon Webb MD  Primary Care Physician: Linda Hoffman, DNP, APRN    Chief Complaint/Interval History: Seizure, grade 3 glioma.  History reviewed.  Question of seizure activity overnight per the nursing staff.  On 3 antiepileptics.    Intake/Output Summary (Last 24 hours) at 6/16/2019 0752  Last data filed at 6/15/2019 2000  Gross per 24 hour   Intake 2100 ml   Output 1125 ml   Net 975 ml     Allergies:   Allergies   Allergen Reactions   • Lipitor [Atorvastatin] Rash   • Lisinopril Angioedema     Swell tongue     Current Scheduled Medications:     chlorhexidine 15 mL Mouth/Throat Q12H   dexamethasone 6 mg Intravenous Q6H   enoxaparin 40 mg Subcutaneous Q24H   lacosamide (VIMPAT) IVPB 150 mg Intravenous Q12H   levETIRAcetam 1,250 mg Intravenous Q12H   pantoprazole 40 mg Intravenous Q AM   piperacillin-tazobactam 4.5 g Intravenous Once   piperacillin-tazobactam 4.5 g Intravenous Q8H   sodium chloride 3 mL Intravenous Q12H   Valproic Acid 750 mg Per G Tube Q6H     Current PRN Medications:  •  acetaminophen **OR** acetaminophen  •  LORazepam  •  ondansetron  •  Pharmacy to dose vancomycin  •  sodium chloride    Review of Systems   Neurological: Positive for seizures.   All other systems reviewed and are negative.      Vital Signs:  /95   Pulse 77   Temp 98.7 °F (37.1 °C) (Axillary)   Resp 19   Ht 172.7 cm (68\")   Wt 82.8 kg (182 lb 9.6 oz)   SpO2 98%   BMI 27.76 kg/m²     Physical Exam   Constitutional: He appears well-developed and well-nourished.   Cardiovascular: Normal rate, regular rhythm and normal heart sounds.   Pulmonary/Chest: Effort normal and breath sounds normal. No respiratory distress.   Abdominal: Soft. He exhibits no distension. There is no tenderness.     Vital signs reviewed.  Line/IV site: No erythema, warmth, induration, or " "tenderness.    Objective:  Vital signs: (most recent): Blood pressure 124/95, pulse 77, temperature 98.7 °F (37.1 °C), temperature source Axillary, resp. rate 19, height 172.7 cm (68\"), weight 82.8 kg (182 lb 9.6 oz), SpO2 98 %.    Lungs:  Normal effort.  He is not in respiratory distress.    Heart: Normal rate.  Regular rhythm.    Abdomen: Abdomen is soft and non-distended.        Neurologic Exam  Sedated on propofol   Pupils equal round reactive to light  lab Results:  ABG: Results from last 7 days   Lab Units 06/15/19  2325 06/15/19  0350 06/14/19  0539   PH, ARTERIAL pH units 7.488* 7.482* 7.467*   PCO2, ARTERIAL mm Hg 24.5* 26.3* 27.0*   PO2 ART mm Hg 121.0* 112.0* 135.0*   VENTILATOR MODE  AC AC AC     CBC: Results from last 7 days   Lab Units 06/16/19  0148 06/15/19  0228 06/14/19  0349   WBC 10*3/mm3 15.75* 13.92* 11.21*   HEMOGLOBIN g/dL 11.8* 11.9* 12.9*   HEMATOCRIT % 35.2* 35.2* 39.0*   PLATELETS 10*3/mm3 196 185 274     BMP:  Results from last 7 days   Lab Units 06/16/19  0148 06/15/19  0228 06/14/19  0914 06/14/19  0539 06/14/19  0349   SODIUM mmol/L 141 143 141  --  143   SODIUM, ARTERIAL mmol/L  --   --   --  141  --    POTASSIUM mmol/L 3.3* 3.9 3.9  --  5.4*   CHLORIDE mmol/L 114* 115* 110  --  110   CO2 mmol/L 18.0* 20.0* 24.0  --  28.0   BUN mg/dL 13 17 26*  --  27*   CREATININE mg/dL 1.07 1.21 1.54*  --  1.70*   GLUCOSE mg/dL 101* 68* 81  --  113*   CALCIUM mg/dL 8.1* 8.2* 8.9  --  8.9   ALT (SGPT) U/L  --  48  --   --  35     Culture Results:        Seizures (CMS/HCC)    Astrocytoma brain tumor (CMS/HCC)    Status epilepticus (CMS/HCC)    Hyperkalemia    Acute kidney injury (CMS/HCC) on chronic kidney disease stage 3    Leukocytosis      Radiology:   The scan of the brain without contrast upon admission shows stable right frontal mass and some surrounding vasogenic edema  Additional Studies Reviewed:     Impression/Recommendations:   CV: Heart rate and blood pressure stable  RESP: Oxygenating " and ventilating well.  GI: N.p.o.  : BUN/creatinine within normal limits  NEURO: I question of ongoing seizure activity.  Currently on 3 antiepileptics and propofol.  Will discuss with neurology.  ID: On empiric antibiotics  Dillon Trevino MD

## 2019-06-17 ENCOUNTER — APPOINTMENT (OUTPATIENT)
Dept: MRI IMAGING | Facility: HOSPITAL | Age: 54
End: 2019-06-17

## 2019-06-17 ENCOUNTER — APPOINTMENT (OUTPATIENT)
Dept: GENERAL RADIOLOGY | Facility: HOSPITAL | Age: 54
End: 2019-06-17

## 2019-06-17 PROBLEM — D64.9 ANEMIA: Status: ACTIVE | Noted: 2019-06-17

## 2019-06-17 LAB
ALBUMIN SERPL-MCNC: 2.9 G/DL (ref 3.5–5)
ALBUMIN/GLOB SERPL: 1.1 G/DL (ref 1.1–2.5)
ALP SERPL-CCNC: 57 U/L (ref 24–120)
ALT SERPL W P-5'-P-CCNC: 65 U/L (ref 0–54)
ANION GAP SERPL CALCULATED.3IONS-SCNC: 5 MMOL/L (ref 4–13)
AST SERPL-CCNC: 46 U/L (ref 7–45)
BASOPHILS # BLD AUTO: 0.01 10*3/MM3 (ref 0–0.2)
BASOPHILS NFR BLD AUTO: 0.1 % (ref 0–2)
BILIRUB SERPL-MCNC: 0.5 MG/DL (ref 0.1–1)
BUN BLD-MCNC: 15 MG/DL (ref 5–21)
BUN/CREAT SERPL: 13.9 (ref 7–25)
CALCIUM SPEC-SCNC: 8.4 MG/DL (ref 8.4–10.4)
CHLORIDE SERPL-SCNC: 111 MMOL/L (ref 98–110)
CO2 SERPL-SCNC: 24 MMOL/L (ref 24–31)
CREAT BLD-MCNC: 1.08 MG/DL (ref 0.5–1.4)
DEPRECATED RDW RBC AUTO: 47.3 FL (ref 40–54)
EOSINOPHIL # BLD AUTO: 0.01 10*3/MM3 (ref 0–0.7)
EOSINOPHIL NFR BLD AUTO: 0.1 % (ref 0–4)
ERYTHROCYTE [DISTWIDTH] IN BLOOD BY AUTOMATED COUNT: 15.1 % (ref 12–15)
GFR SERPL CREATININE-BSD FRML MDRD: 72 ML/MIN/1.73
GLOBULIN UR ELPH-MCNC: 2.6 GM/DL
GLUCOSE BLD-MCNC: 86 MG/DL (ref 70–100)
HCT VFR BLD AUTO: 33.9 % (ref 40–52)
HGB BLD-MCNC: 11.4 G/DL (ref 14–18)
IMM GRANULOCYTES # BLD AUTO: 0.1 10*3/MM3 (ref 0–0.05)
IMM GRANULOCYTES NFR BLD AUTO: 0.6 % (ref 0–5)
LYMPHOCYTES # BLD AUTO: 1.71 10*3/MM3 (ref 0.72–4.86)
LYMPHOCYTES NFR BLD AUTO: 11 % (ref 15–45)
MCH RBC QN AUTO: 28.6 PG (ref 28–32)
MCHC RBC AUTO-ENTMCNC: 33.6 G/DL (ref 33–36)
MCV RBC AUTO: 85.2 FL (ref 82–95)
MONOCYTES # BLD AUTO: 0.75 10*3/MM3 (ref 0.19–1.3)
MONOCYTES NFR BLD AUTO: 4.8 % (ref 4–12)
NEUTROPHILS # BLD AUTO: 13 10*3/MM3 (ref 1.87–8.4)
NEUTROPHILS NFR BLD AUTO: 83.4 % (ref 39–78)
NRBC BLD AUTO-RTO: 0 /100 WBC (ref 0–0.2)
PLATELET # BLD AUTO: 195 10*3/MM3 (ref 130–400)
PMV BLD AUTO: 11.4 FL (ref 6–12)
POTASSIUM BLD-SCNC: 3.6 MMOL/L (ref 3.5–5.3)
PROT SERPL-MCNC: 5.5 G/DL (ref 6.3–8.7)
RBC # BLD AUTO: 3.98 10*6/MM3 (ref 4.8–5.9)
SODIUM BLD-SCNC: 140 MMOL/L (ref 135–145)
VALPROATE SERPL-MCNC: 39.5 MCG/ML (ref 50–100)
WBC NRBC COR # BLD: 15.58 10*3/MM3 (ref 4.8–10.8)

## 2019-06-17 PROCEDURE — 97166 OT EVAL MOD COMPLEX 45 MIN: CPT | Performed by: OCCUPATIONAL THERAPIST

## 2019-06-17 PROCEDURE — 97162 PT EVAL MOD COMPLEX 30 MIN: CPT

## 2019-06-17 PROCEDURE — 25010000002 DEXAMETHASONE PER 1 MG: Performed by: NEUROLOGICAL SURGERY

## 2019-06-17 PROCEDURE — 80164 ASSAY DIPROPYLACETIC ACD TOT: CPT | Performed by: PSYCHIATRY & NEUROLOGY

## 2019-06-17 PROCEDURE — 99291 CRITICAL CARE FIRST HOUR: CPT | Performed by: PSYCHIATRY & NEUROLOGY

## 2019-06-17 PROCEDURE — 70553 MRI BRAIN STEM W/O & W/DYE: CPT

## 2019-06-17 PROCEDURE — 25010000002 PIPERACILLIN SOD-TAZOBACTAM PER 1 G: Performed by: INTERNAL MEDICINE

## 2019-06-17 PROCEDURE — 99024 POSTOP FOLLOW-UP VISIT: CPT | Performed by: NURSE PRACTITIONER

## 2019-06-17 PROCEDURE — 97116 GAIT TRAINING THERAPY: CPT

## 2019-06-17 PROCEDURE — 25010000002 ENOXAPARIN PER 10 MG: Performed by: INTERNAL MEDICINE

## 2019-06-17 PROCEDURE — 0 GADOBENATE DIMEGLUMINE 529 MG/ML SOLUTION: Performed by: INTERNAL MEDICINE

## 2019-06-17 PROCEDURE — 97535 SELF CARE MNGMENT TRAINING: CPT | Performed by: OCCUPATIONAL THERAPIST

## 2019-06-17 PROCEDURE — 25010000002 LEVETRIRACETAM PER 10 MG: Performed by: PSYCHIATRY & NEUROLOGY

## 2019-06-17 PROCEDURE — 85025 COMPLETE CBC W/AUTO DIFF WBC: CPT | Performed by: INTERNAL MEDICINE

## 2019-06-17 PROCEDURE — 80053 COMPREHEN METABOLIC PANEL: CPT | Performed by: INTERNAL MEDICINE

## 2019-06-17 PROCEDURE — 92610 EVALUATE SWALLOWING FUNCTION: CPT

## 2019-06-17 PROCEDURE — A9577 INJ MULTIHANCE: HCPCS | Performed by: INTERNAL MEDICINE

## 2019-06-17 RX ORDER — PANTOPRAZOLE SODIUM 40 MG/1
40 TABLET, DELAYED RELEASE ORAL
Status: DISCONTINUED | OUTPATIENT
Start: 2019-06-18 | End: 2019-06-20 | Stop reason: HOSPADM

## 2019-06-17 RX ORDER — LACOSAMIDE 50 MG/1
150 TABLET ORAL EVERY 12 HOURS SCHEDULED
Status: DISCONTINUED | OUTPATIENT
Start: 2019-06-18 | End: 2019-06-20 | Stop reason: HOSPADM

## 2019-06-17 RX ORDER — QUETIAPINE FUMARATE 25 MG/1
50 TABLET, FILM COATED ORAL NIGHTLY
Status: DISCONTINUED | OUTPATIENT
Start: 2019-06-17 | End: 2019-06-20 | Stop reason: HOSPADM

## 2019-06-17 RX ORDER — VENLAFAXINE HYDROCHLORIDE 75 MG/1
150 CAPSULE, EXTENDED RELEASE ORAL DAILY
Status: DISCONTINUED | OUTPATIENT
Start: 2019-06-17 | End: 2019-06-20 | Stop reason: HOSPADM

## 2019-06-17 RX ADMIN — VALPROATE SODIUM 750 MG: 100 INJECTION, SOLUTION INTRAVENOUS at 13:19

## 2019-06-17 RX ADMIN — SODIUM CHLORIDE 150 MG: 9 INJECTION, SOLUTION INTRAVENOUS at 01:41

## 2019-06-17 RX ADMIN — SODIUM CHLORIDE 150 MG: 9 INJECTION, SOLUTION INTRAVENOUS at 13:52

## 2019-06-17 RX ADMIN — LEVETIRACETAM 1250 MG: 500 TABLET, FILM COATED ORAL at 21:19

## 2019-06-17 RX ADMIN — DIVALPROEX SODIUM 750 MG: 500 TABLET, DELAYED RELEASE ORAL at 16:10

## 2019-06-17 RX ADMIN — DEXAMETHASONE SODIUM PHOSPHATE 6 MG: 10 INJECTION INTRAMUSCULAR; INTRAVENOUS at 05:17

## 2019-06-17 RX ADMIN — VALPROATE SODIUM 750 MG: 100 INJECTION, SOLUTION INTRAVENOUS at 05:17

## 2019-06-17 RX ADMIN — SODIUM CHLORIDE 100 ML/HR: 9 INJECTION, SOLUTION INTRAVENOUS at 02:48

## 2019-06-17 RX ADMIN — CHLORHEXIDINE GLUCONATE 15 ML: 1.2 RINSE ORAL at 21:19

## 2019-06-17 RX ADMIN — LEVETIRACETAM 1250 MG: 500 INJECTION, SOLUTION INTRAVENOUS at 08:28

## 2019-06-17 RX ADMIN — SODIUM CHLORIDE, PRESERVATIVE FREE 3 ML: 5 INJECTION INTRAVENOUS at 21:17

## 2019-06-17 RX ADMIN — QUETIAPINE FUMARATE 50 MG: 25 TABLET, FILM COATED ORAL at 21:19

## 2019-06-17 RX ADMIN — VENLAFAXINE HYDROCHLORIDE 150 MG: 75 CAPSULE, EXTENDED RELEASE ORAL at 12:13

## 2019-06-17 RX ADMIN — PANTOPRAZOLE SODIUM 40 MG: 40 INJECTION, POWDER, FOR SOLUTION INTRAVENOUS at 05:17

## 2019-06-17 RX ADMIN — GADOBENATE DIMEGLUMINE 17 ML: 529 INJECTION, SOLUTION INTRAVENOUS at 17:30

## 2019-06-17 RX ADMIN — SODIUM CHLORIDE, PRESERVATIVE FREE 3 ML: 5 INJECTION INTRAVENOUS at 08:29

## 2019-06-17 RX ADMIN — SODIUM CHLORIDE 100 ML/HR: 9 INJECTION, SOLUTION INTRAVENOUS at 13:26

## 2019-06-17 RX ADMIN — DEXAMETHASONE SODIUM PHOSPHATE 6 MG: 10 INJECTION INTRAMUSCULAR; INTRAVENOUS at 13:19

## 2019-06-17 RX ADMIN — TAZOBACTAM SODIUM AND PIPERACILLIN SODIUM 4.5 G: 500; 4 INJECTION, SOLUTION INTRAVENOUS at 13:19

## 2019-06-17 RX ADMIN — DIVALPROEX SODIUM 750 MG: 500 TABLET, DELAYED RELEASE ORAL at 22:58

## 2019-06-17 RX ADMIN — DEXAMETHASONE SODIUM PHOSPHATE 6 MG: 10 INJECTION INTRAMUSCULAR; INTRAVENOUS at 22:59

## 2019-06-17 RX ADMIN — TAZOBACTAM SODIUM AND PIPERACILLIN SODIUM 4.5 G: 500; 4 INJECTION, SOLUTION INTRAVENOUS at 21:19

## 2019-06-17 RX ADMIN — ENOXAPARIN SODIUM 40 MG: 40 INJECTION SUBCUTANEOUS at 08:28

## 2019-06-17 RX ADMIN — TAZOBACTAM SODIUM AND PIPERACILLIN SODIUM 4.5 G: 500; 4 INJECTION, SOLUTION INTRAVENOUS at 05:17

## 2019-06-17 NOTE — NURSING NOTE
Pt became extremely agitated when SCD's were applied, begged to take them off. Lovenox is ordered for VTE.

## 2019-06-17 NOTE — THERAPY EVALUATION
Acute Care - Occupational Therapy Initial Evaluation  [facility]     Patient Name: Lukasz Savage  : 1965  MRN: [id number]  Today's Date: 2019  Onset of Illness/Injury or Date of Surgery: 19  Date of Referral to OT: 19  Referring Physician: Dr. Webb    Admit Date: 2019       ICD-10-CM ICD-9-CM   1. Status epilepticus (CMS/HCC) G40.901 345.3   2. 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 Dysphagia, unspecified type R13.10 787.20   4. Impaired mobility Z74.09 799.89   5. Impaired mobility and ADLs Z74.09 799.89     Patient Active Problem List   Diagnosis   • Hyperlipidemia   • HTN (hypertension)   • Gout   • Anxiety   • Arthritis   • Erectile dysfunction   • Angio-edema   • Seizures (CMS/HCC)   • HCAP (healthcare-associated pneumonia)   • MSSA (methicillin susceptible Staphylococcus aureus) infection   • Foot deformity   • Chronic gout of right foot   • Peripheral edema   • S/P shoulder surgery   • Claudication (CMS/HCC)   • Cigarette smoker   • BMI 30.0-30.9,adult   • BMI 33.0-33.9,adult   • Astrocytoma brain tumor (CMS/HCC)   • Encounter for consultation   • Benign neoplasm of brain (CMS/HCC)   • Status epilepticus (CMS/HCC)   • Hyperkalemia   • Acute kidney injury (CMS/HCC) on chronic kidney disease stage 3   • Leukocytosis   • Anemia     Past Medical History:   Diagnosis Date   • Anemia 2019   • Angio-edema 7/3/2017   • Anxiety    • Arthritis    • Cancer (CMS/HCC) 2019    Brain cancer diagnoised yesterday  Dr Trevino    • Clostridium difficile colitis    • Erectile dysfunction 10/6/2016   • GERD (gastroesophageal reflux disease)    • Gout    • HCAP (healthcare-associated pneumonia) 2017   • Hyperlipidemia    • Malignant hypertension    • MSSA (methicillin susceptible Staphylococcus aureus) infection 2017   • Obstructive sleep apnea    • Seizures (CMS/HCC) 2017     Past Surgical History:   Procedure Laterality Date   • COLONOSCOPY     •  CRANIOTOMY Right 4/15/2019    Procedure: CRANIOTOMY WITH BIOPSY RIGHT;  Surgeon: Dillon Trevino MD;  Location:  [facility] OR;  Service: Neurosurgery   • SHOULDER ARTHROSCOPY Left    • TRACHEOSTOMY N/A 7/12/2017    Procedure: TRACHEOSTOMY WITH TRACHEOSCOPY;  Surgeon: Jairon Pierson MD;  Location:  [facility] OR;  Service:           OT ASSESSMENT FLOWSHEET (last 12 hours)      Occupational Therapy Evaluation     Row Name 06/17/19 0930                   OT Evaluation Time/Intention    Subjective Information  complains of;pain;weakness;fatigue  -MM        Document Type  evaluation see MAR  -MM        Mode of Treatment  occupational therapy  -MM        Comment  Pt and daughter report recent L rib fxs   (Significant)   -MM           General Information    Patient Profile Reviewed?  yes  -MM        Onset of Illness/Injury or Date of Surgery  06/14/19  -MM        Referring Physician  Dr. Webb  -MM        Patient Observations  alert;cooperative;agree to therapy  -MM        Patient/Family Observations  daughter present  -MM        General Observations of Patient  fowlers, bed alarm  -MM        Prior Level of Function  independent:;all household mobility;community mobility;transfer;bed mobility;ADL's;feeding;grooming;dressing;bathing  -MM        Equipment Currently Used at Home  cane, straight  -MM        Pertinent History of Current Functional Problem  Admitted with seizure. Dx: grade 3 astrocytoma, seizures with h/o seizures, MILES on CKD 3. L sided deficits from previous episodes, Pt began raditiation and chemo treatments June 13.  -MM        Existing Precautions/Restrictions  fall  -MM        Limitations/Impairments  safety/cognitive  -MM        Risks Reviewed  patient and family:;nausea/vomiting;dizziness;increased discomfort;change in vital signs;increased drainage;lines disloged  -MM        Benefits Reviewed  patient and family:;increase independence;improve function;increase strength;increase balance;decrease pain;decrease  risk of DVT;improve skin integrity;increase knowledge  -MM        Barriers to Rehab  medically complex;previous functional deficit  -MM           Relationship/Environment    Primary Source of Support/Comfort  parent  -MM        Lives With  parent(s)  -MM        Name(s) of Who Lives With Patient  Ivonne  -MM           Resource/Environmental Concerns    Current Living Arrangements  home/apartment/condo  -MM           Home Main Entrance    Number of Stairs, Main Entrance  three  -MM           Cognitive Assessment/Interventions    Additional Documentation  Cognitive Assessment/Intervention (Group)  -MM           Cognitive Assessment/Intervention- PT/OT    Affect/Mental Status (Cognitive)  WFL  -MM        Orientation Status (Cognition)  oriented x 4  -MM        Follows Commands (Cognition)  WFL  -MM        Cognitive Function (Cognitive)  --  -MM        Safety Deficit (Cognitive)  mild deficit;moderate deficit;problem solving;impulsivity;awareness of need for assistance;insight into deficits/self awareness  -MM        Personal Safety Interventions  elopement precautions initiated;fall prevention program maintained;gait belt;muscle strengthening facilitated;nonskid shoes/slippers when out of bed  -MM           Safety Issues, Functional Mobility    Impairments Affecting Function (Mobility)  balance;endurance/activity tolerance;postural/trunk control  -MM           Bed Mobility Assessment/Treatment    Bed Mobility Assessment/Treatment  supine-sit;sit-supine  -MM        Supine-Sit [place] (Bed Mobility)  contact guard  -MM        Sit-Supine [place] (Bed Mobility)  contact guard  -MM        Assistive Device (Bed Mobility)  head of bed elevated  -MM           Transfer Assessment/Treatment    Transfer Assessment/Treatment  sit-stand transfer;stand-sit transfer  -MM           Sit-Stand Transfer    Sit-Stand [place] (Transfers)  contact guard;2 person assist  -MM           Stand-Sit Transfer    Stand-Sit [place]  (Transfers)  contact guard;2 person assist  -MM           ADL Assessment/Intervention    BADL Assessment/Intervention  lower body dressing  -MM           Lower Body Dressing Assessment/Training    Lower Body Dressing [place] Level  don;socks;maximum assist (25% patient effort)  -MM        Lower Body Dressing Position  edge of bed sitting  -MM           BADL Safety/Performance    Impairments, BADL Safety/Performance  cognition;endurance/activity tolerance;motor planning;trunk/postural control  -MM           General ROM    GENERAL ROM COMMENTS  BUE WNL, except L shoulder 50% impaired  -MM           MMT (Manual Muscle Testing)    General MMT Comments  RUE 5/5, LUE 4+/5  -MM           Motor Assessment/Interventions    Additional Documentation  Fine Motor Testing & Training (Group);Gross Motor Coordination (Group)  -MM           Gross Motor Coordination    Gross Motor Skill, Impairments Detail  Finger to nose impaired BUE, RUE past pointing, LUE unable to complete task  -MM           Fine Motor Testing & Training    Comment, Fine Motor Coordination  Functionally impaired BUE  -MM           Sensory Assessment/Intervention    Sensory General Assessment  no sensation deficits identified  -MM           Vision Assessment/Intervention    Visual Processing Deficit  tiffanie-inattention/neglect, left  (Significant)   -MM           Positioning and Restraints    Pre-Treatment Position  in bed  -MM        Post Treatment Position  bed  -MM        In Bed  notified nsg;fowlers;call light within reach;encouraged to call for assist;exit alarm on;patient within staff view;with family/caregiver  -MM           Pain Scale: Numbers Pre/Post-Treatment    Pain Scale: Numbers, Pretreatment  0/10 - no pain  -MM        Pain Scale: Numbers, Post-Treatment  0/10 - no pain  -MM           Coping    Observed Emotional State  accepting;cooperative  -MM           Plan of Care Review    Plan of Care Reviewed With  patient;daughter;mother  -MM            Clinical Impression (OT)    Date of Referral to OT  06/17/19  -MM        OT Diagnosis  impaired mobility and adls  -MM        Patient/Family Goals Statement (OT Eval)  return home  -MM        Criteria for Skilled Therapeutic Interventions Met (OT Eval)  yes;treatment indicated  -MM        Rehab Potential (OT Eval)  good, to achieve stated therapy goals  -MM        Therapy Frequency (OT Eval)  daily  -MM        Predicted Duration of Therapy Intervention (Therapy Eval)  until d/c  -MM        Care Plan Review (OT)  evaluation/treatment results reviewed;care plan/treatment goals reviewed;risks/benefits reviewed;current/potential barriers reviewed;patient/other agree to care plan  -MM        Care Plan Review, Other Participant (OT Eval)  daughter;mother  -MM        Anticipated Discharge Disposition (OT)  home with assist;home with home health;home with OP services  -MM           Planned OT Interventions    Planned Therapy Interventions (OT Eval)  activity tolerance training;adaptive equipment training;BADL retraining;functional balance retraining;neuromuscular control/coordination retraining;occupation/activity based interventions;cognitive/visual perception retraining;patient/caregiver education/training;transfer/mobility retraining;passive ROM/stretching  -MM           OT Goals    Bathing Goal Selection (OT)  bathing, OT goal 1  -MM        Dressing Goal Selection (OT)  dressing, OT goal 1  -MM        Coordination Goal Selection (OT)  coordination, OT goal 1  -MM        Additional Documentation  Coordination Goal Selection (OT) (Row)  -MM           Bathing Goal 1 (OT)    Activity/Assistive Device (Bathing Goal 1, OT)  bathing skills, all  -MM        [place] Level/Cues Needed (Bathing Goal 1, OT)  supervision required;set-up required  -MM        Time Frame (Bathing Goal 1, OT)  10 days  -MM        Progress/Outcomes (Bathing Goal 1, OT)  goal ongoing  -MM           Dressing Goal 1 (OT)    Activity/Assistive Device  (Dressing Goal 1, OT)  dressing skills, all  -MM        [place]/Cues Needed (Dressing Goal 1, OT)  supervision required;set-up required  -MM        Time Frame (Dressing Goal 1, OT)  10 days  -MM        Progress/Outcome (Dressing Goal 1, OT)  goal ongoing  -MM           Coordination Goal 1 (OT)    Activity/Assistive Device (Coordination Goal 1, OT)  FM written ex program;GM written ex program;FM task;GM task  -MM        [place] Level/Cues Needed (Coordination Goal 1, OT)  conditional independence;verbal cues required  -MM        Time Frame (Coordination Goal 1, OT)  10 days  -MM        Progress/Outcomes (Coordination Goal 1, OT)  goal ongoing  -MM           Living Environment    Home Accessibility  stairs to enter home;tub/shower is not walk in  -MM          User Key  (r) = Recorded By, (t) = Taken By, (c) = Cosigned By    Initials Name Effective Dates    Aaron Perry OTR/SRINIVAS 04/03/18 -          Occupational Therapy Education     Title: PT OT SLP Therapies (Not Started)     Topic: Occupational Therapy (In Progress)     Point: ADL training (Done)     Description: Instruct learner(s) on proper safety adaptation and remediation techniques during self care or transfers.   Instruct in proper use of assistive devices.    Learning Progress Summary           Patient Acceptance, E, VU,NR by SARI at 6/17/2019 11:30 AM    Comment:  OT role, benefits, POC, d/c planning.   Family Acceptance, E, VU,NR by SARI at 6/17/2019 11:30 AM    Comment:  OT role, benefits, POC, d/c planning.                   Point: Precautions (Done)     Description: Instruct learner(s) on prescribed precautions during self-care and functional transfers.    Learning Progress Summary           Patient Acceptance, E, VU,NR by SARI at 6/17/2019 11:30 AM    Comment:  OT role, benefits, POC, d/c planning.   Family Acceptance, E, VU,NR by SARI at 6/17/2019 11:30 AM    Comment:  OT role, benefits, POC, d/c planning.                               User Key      Initials Effective Dates Name Provider Type Discipline     04/03/18 -  Aaron Barron, OTR/L Occupational Therapist OT                  OT Recommendation and Plan  Outcome Summary/Treatment Plan (OT)  Anticipated Discharge Disposition (OT): home with assist, home with home health, home with OP services  Planned Therapy Interventions (OT Eval): activity tolerance training, adaptive equipment training, BADL retraining, functional balance retraining, neuromuscular control/coordination retraining, occupation/activity based interventions, cognitive/visual perception retraining, patient/caregiver education/training, transfer/mobility retraining, passive ROM/stretching  Therapy Frequency (OT Eval): daily  Plan of Care Review  Plan of Care Reviewed With: patient, daughter, mother  Plan of Care Reviewed With: patient, daughter, mother  Outcome Summary: OT eval completed. pt reports no pain. Pt's daughter reports recent L rib fractures. Pt is impulsive and has decreased processing. pt has L inattention. Pt has decreased coordination on L side. Pt CGA x2 for functional mobility with verbal cues. Pt max A to kaur socks. Skilled OT recommended to address adls, functional mobility and education. Recommended d/c home with assist and HH/OP pending pt progress.    Outcome Measures     Row Name 06/17/19 1100 06/17/19 0915          How much help from another person do you currently need...    Turning from your back to your side while in flat bed without using bedrails?  --  3  -MARJORIE     Moving from lying on back to sitting on the side of a flat bed without bedrails?  --  3  -MARJORIE     Moving to and from a bed to a chair (including a wheelchair)?  --  3  -MARJORIE     Standing up from a chair using your arms (e.g., wheelchair, bedside chair)?  --  3  -MARJORIE     Climbing 3-5 steps with a railing?  --  3  -MARJORIE     To walk in hospital room?  --  3  -MARJORIE     AM-PAC 6 Clicks Score  --  18  -MARJORIE        How much help from another is currently  needed...    Putting on and taking off regular lower body clothing?  2  -MM  --     Bathing (including washing, rinsing, and drying)  2  -MM  --     Toileting (which includes using toilet bed pan or urinal)  2  -MM  --     Putting on and taking off regular upper body clothing  2  -MM  --     Taking care of personal grooming (such as brushing teeth)  3  -MM  --     Eating meals  3  -MM  --     Score  14  -MM  --        Functional Assessment    Outcome Measure Options  AM-PAC 6 Clicks Daily Activity (OT)  -MM  AM-PAC 6 Clicks Basic Mobility (PT)  -MARJORIE       User Key  (r) = Recorded By, (t) = Taken By, (c) = Cosigned By    Initials Name Provider Type    Jaime Haile, PT DPT Physical Therapist    MM Aaron Barron, OTR/L Occupational Therapist          Time Calculation:   Time Calculation- OT     Row Name 06/17/19 1404 06/17/19 0916          Time Calculation- OT    OT Start Time  --  0916 +15 min chart review  -MM     OT Stop Time  --  1010  -MM     OT Time Calculation (min)  --  54 min  -MM     Total Timed Code Minutes- OT  --  10 minute(s)  -MM     OT Received On  --  06/17/19  -MM     OT Goal Re-Cert Due Date  --  06/27/19  -MM        Timed Charges    15610 - Gait Training Minutes   24  -SUKHDEEP  --     50606 - OT Self Care/Mgmt Minutes  --  10  -MM       User Key  (r) = Recorded By, (t) = Taken By, (c) = Cosigned By    Initials Name Provider Type    Britt Zeng, PTA Physical Therapy Assistant    MM Aaron Barron, OTR/L Occupational Therapist        Therapy Charges for Today     Code Description Service Date Service Provider Modifiers Qty    98877917696 HC OT SELF CARE/MGMT/TRAIN EA 15 MIN 6/17/2019 Aaron Barron, OTR/L GO 1    63164747254 HC OT EVAL MOD COMPLEXITY 4 6/17/2019 Aaron Barron OTR/L GO 1               FRANTZ Rosado/SRINIVAS  6/17/2019

## 2019-06-17 NOTE — NURSING NOTE
Paged Dr. Simmons because pt failed bedside swallow and anti-seizure meds are po. Orders changed see MAR.

## 2019-06-17 NOTE — THERAPY EVALUATION
Acute Care - Speech Language Pathology   Swallow Initial Evaluation [facility]     Patient Name: Lukasz Savage  : 1965  MRN: [id number]  Today's Date: 2019  Onset of Illness/Injury or Date of Surgery: 19     Referring Physician: Dr. Webb      Admit Date: 2019    Bedside swallow eval completed. Pt s/p extubation on 2019. Pt was presented a full range of consistencies excluding mechanical soft. Pt was noted to have decreased visual attention, especially on L, requiring verbal and tactile cues to increase awareness of presence of spoon and straw. Decreased labial seal on spoon, which initially resulted in inability to extract nectar thick bolus via straw, though efficient when cues were provided. No noted concerns with oral transit. Initially appeared to have weak laryngeal elevation with delayed swlalow initiation, yet improved with further trials. Impulsive with bolus size with thin via straw. Adequate rotary chew with regular solid with mild oral residue, cleared with a thin liquid wash. No overt s/s of aspiration. Cannot fully r/o aspiration at this time. RECOMMENDATIONS: D/C NPO status, ok to start regular solid diet consistency with regular/thin liquid consistency; meds whole with thin liquids; cue pt to encourage decreased impulsivity with thin liquids; RN to monitor for increased congestion; General feeding/aspiration precautions with PO. ST to monitor diet toleration PRN and to tx oral musculature strengthening tasks. ST will also monitor to eval speech-language/cognitive fx, secondary to observed dysarthria and cognitive impairment, as well as visual impairment. Thanks!   Amira Ricks, CCC-SLP 2019 9:47 AM    Visit Dx:     ICD-10-CM ICD-9-CM   1. Status epilepticus (CMS/HCC) G40.901 345.3   2. 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 Dysphagia, unspecified type R13.10 787.20     Patient Active Problem List   Diagnosis   • Hyperlipidemia   • HTN  (hypertension)   • Gout   • Anxiety   • Arthritis   • Erectile dysfunction   • Angio-edema   • Seizures (CMS/HCC)   • HCAP (healthcare-associated pneumonia)   • MSSA (methicillin susceptible Staphylococcus aureus) infection   • Foot deformity   • Chronic gout of right foot   • Peripheral edema   • S/P shoulder surgery   • Claudication (CMS/HCC)   • Cigarette smoker   • BMI 30.0-30.9,adult   • BMI 33.0-33.9,adult   • Astrocytoma brain tumor (CMS/HCC)   • Encounter for consultation   • Benign neoplasm of brain (CMS/HCC)   • Status epilepticus (CMS/HCC)   • Hyperkalemia   • Acute kidney injury (CMS/HCC) on chronic kidney disease stage 3   • Leukocytosis   • Anemia     Past Medical History:   Diagnosis Date   • Anemia 6/17/2019   • Angio-edema 7/3/2017   • Anxiety    • Arthritis    • Cancer (CMS/HCC) 05/09/2019    Brain cancer diagnoised yesterday  Dr Trevino    • Clostridium difficile colitis    • Erectile dysfunction 10/6/2016   • GERD (gastroesophageal reflux disease)    • Gout    • HCAP (healthcare-associated pneumonia) 7/11/2017   • Hyperlipidemia    • Malignant hypertension    • MSSA (methicillin susceptible Staphylococcus aureus) infection 7/31/2017   • Obstructive sleep apnea    • Seizures (CMS/HCC) 7/4/2017     Past Surgical History:   Procedure Laterality Date   • COLONOSCOPY     • CRANIOTOMY Right 4/15/2019    Procedure: CRANIOTOMY WITH BIOPSY RIGHT;  Surgeon: Dillon Trevino MD;  Location: [facility];  Service: Neurosurgery   • SHOULDER ARTHROSCOPY Left    • TRACHEOSTOMY N/A 7/12/2017    Procedure: TRACHEOSTOMY WITH TRACHEOSCOPY;  Surgeon: Jairon Pierson MD;  Location: [facility];  Service:         SWALLOW EVALUATION (last 72 hours)      SLP Adult Swallow Evaluation     Row Name 06/17/19 0843                   Rehab Evaluation    Document Type  evaluation  -TM        Subjective Information  no complaints  -TM        Patient Observations  cooperative Fatigued, confused, distracted, impulsive  -TM         Patient Effort  adequate  -TM           General Information    Patient Profile Reviewed  yes  -TM        Pertinent History Of Current Problem  Intubated 06/14/2019, extubated 06/16/2019. Acute seizures. Recent dx of astrocytoma with hx of 2x chemo and radiation tx prior to acute admit per family report. R craniotomy with biopsy. Hx of CKD, c-diff, sleep apnea, pneumonia, GERD, MSSA.   -TM        Current Method of Nutrition  NPO  -TM        Precautions/Limitations, Vision  vision impairment, left;vision impairment, bilaterally;other (see comments) Appeared to have L neglect, yet also difficulty tracking   -TM        Precautions/Limitations, Hearing  WFL  -TM        Prior Level of Function-Communication  cognitive-linguistic impairment;motor speech impairment  -TM        Prior Level of Function-Swallowing  no diet consistency restrictions  -TM        Plans/Goals Discussed with  patient;family;other (see comments) RN, Robe  -TM        Barriers to Rehab  cognitive status;previous functional deficit;visual deficit  -TM        Patient's Goals for Discharge  return to PO diet  -TM        Family Goals for Discharge  patient able to return to PO diet  -TM           Pain Assessment    Additional Documentation  Pain Scale: Numbers Pre/Post-Treatment (Group)  -TM           Pain Scale: Numbers Pre/Post-Treatment    Pain Scale: Numbers, Pretreatment  0/10 - no pain  -TM        Pain Scale: Numbers, Post-Treatment  0/10 - no pain  -TM           Oral Motor and Function    Dentition Assessment  natural, present and adequate  -TM        Secretion Management  WNL/WFL  -TM        Mucosal Quality  ulcerated;other (see comments) Lingually, yet pt reported no lingual pain   -TM           Oral Musculature and Cranial Nerve Assessment    Oral Motor General Assessment  oral labial or buccal impairment;lingual impairment;mandibular impairment;vocal impairment  -TM        Mandibular Impairment Detail, Cranial Nerve V (Trigeminal)  reduced  mandibular ROM;reduced strength bilaterally  -TM        Oral Labial or Buccal Impairment, Detail, Cranial Nerve VII (Facial):  reduced ROM;reduced strength bilaterally  -TM        Lingual Impairment, Detail. Cranial Nerves IX, XII (Glossopharyngeal and Hypoglossal)  reduced lingual ROM;reduced strength  -TM        Vocal Impairment, Detail. Cranial Nerve X (Vagus)  vocal quality abnormality (see comments);other (see comments) Mildly hoarse vocal quality, family agreed   -TM           General Eating/Swallowing Observations    Respiratory Support Currently in Use  room air  -TM        Eating/Swallowing Skills  fed by SLP  -TM        Positioning During Eating  upright 90 degree;upright in bed  -TM        Utensils Used  spoon;straw  -TM        Consistencies Trialed  pudding thick;honey-thick liquids;nectar/syrup-thick liquids;thin liquids;regular textures  -TM           Respiratory    Respiratory Status  WFL  -TM           Clinical Swallow Eval    Oral Prep Phase  impaired  -TM        Oral Transit  WFL  -TM        Oral Residue  WFL  -TM        Pharyngeal Phase  WFL  -TM        Esophageal Phase  unremarkable  -TM        Clinical Swallow Evaluation Summary  Bedside swallow eval completed. Pt s/p extubation on 06/16/2019. Pt was presented a full range of consistencies excluding mechanical soft. Pt was noted to have decreased visual attention, especially on L, requiring verbal and tactile cues to increase awareness of presence of spoon and straw. Decreased labial seal on spoon, which initially resulted in inability to extract nectar thick bolus via straw, though efficient when cues were provided. No noted concerns with oral transit. Initially appeared to have weak laryngeal elevation with delayed swlalow initiation, yet improved with further trials. Impulsive with bolus size with thin via straw. Adequate rotary chew with regular solid with mild oral residue, cleared with a thin liquid wash. No overt s/s of aspiration. Cannot  fully r/o aspiration at this time.   -TM           Oral Prep Concerns    Oral Prep Concerns  incomplete or weak lip closure around spoon;oral holding  -TM        Oral Holding  pudding  -TM        Incomplete or Weak Lip Closure Around Spoon  nectar  -TM        Oral Prep Concerns, Comment  Oral hold/delayed initiation x1 with pudding thick, which was the initial trial of the assessment.  -TM           Clinical Impression    SLP Swallowing Diagnosis  mild;functional oral phase;functional pharyngeal phase  -TM        Functional Impact  risk of aspiration/pneumonia  -TM        Rehab Potential/Prognosis, Swallowing  good, to achieve stated therapy goals  -TM        Swallow Criteria for Skilled Therapeutic Interventions Met  demonstrates skilled criteria  -TM           Recommendations    Therapy Frequency (Swallow)  PRN  -TM        Predicted Duration Therapy Intervention (Days)  until discharge  -TM        SLP Diet Recommendation  regular textures;thin liquids  -TM        Recommended Precautions and Strategies  upright posture during/after eating;small bites of food and sips of liquid  -TM        SLP Rec. for Method of Medication Administration  meds whole;with thin liquids  -TM        Monitor for Signs of Aspiration  yes;cough;gurgly voice;throat clearing;pneumonia;right lower lobe infiltrates  -TM        Anticipated Dischage Disposition  unknown  -TM           Swallow Goals (SLP)    Oral Nutrition/Hydration Goal Selection (SLP)  oral nutrition/hydration, SLP goal 1  -TM        Labial Strengthening Goal Selection (SLP)  labial strengthening, SLP goal 1  -TM        Lingual Strengthening Goal Selection (SLP)  lingual strengthening, SLP goal 1  -TM        Additional Documentation  labial strengthening goal selection (SLP);lingual strengthening goal selection (SLP)  -TM           Oral Nutrition/Hydration Goal 1 (SLP)    Oral Nutrition/Hydration Goal 1, SLP  Pt will tolerate LRD without overt s/s of aspiration.  -TM         Time Frame (Oral Nutrition/Hydration Goal 1, SLP)  by discharge  -TM        Barriers (Oral Nutrition/Hydration Goal 1, SLP)  Cognition   -TM        Progress/Outcomes (Oral Nutrition/Hydration Goal 1, SLP)  goal ongoing  -TM           Labial Strengthening Goal 1 (SLP)    Activity (Labial Strengthening Goal 1, SLP)  increase labial tone  -TM        Increase Labial Tone  labial resistance exercises  -TM        [place]/Accuracy (Labial Strengthening Goal 1, SLP)  independently (over 90% accuracy)  -TM        Time Frame (Labial Strengthening Goal 1, SLP)  by discharge  -TM        Barriers (Labial Strengthening Goal 1, SLP)  Cognition  -TM        Progress/Outcomes (Labial Strengthening Goal 1, SLP)  goal ongoing  -TM           Lingual Strengthening Goal 1 (SLP)    Activity (Lingual Strengthening Goal 1, SLP)  increase lingual tone/sensation/control/coordination/movement  -TM        Increase Lingual Tone/Sensation/Control/Coordination/Movement  lingual movement exercises  -TM        [place]/Accuracy (Lingual Strengthening Goal 1, SLP)  independently (over 90% accuracy)  -TM        Time Frame (Lingual Strengthening Goal 1, SLP)  by discharge  -TM        Barriers (Lingual Strengthening Goal 1, SLP)  Cognition  -TM        Progress/Outcomes (Lingual Strengthening Goal 1, SLP)  goal ongoing  -TM          User Key  (r) = Recorded By, (t) = Taken By, (c) = Cosigned By    Initials Name Effective Dates    TM Amira Ricks CCC-SLP 08/02/16 -           EDUCATION  The patient has been educated in the following areas:   Dysphagia (Swallowing Impairment).    SLP Recommendation and Plan  SLP Swallowing Diagnosis: mild, functional oral phase, functional pharyngeal phase  SLP Diet Recommendation: regular textures, thin liquids  Recommended Precautions and Strategies: upright posture during/after eating, small bites of food and sips of liquid  SLP Rec. for Method of Medication Administration: meds whole, with thin liquids      Monitor for Signs of Aspiration: yes, cough, gurgly voice, throat clearing, pneumonia, right lower lobe infiltrates     Swallow Criteria for Skilled Therapeutic Interventions Met: demonstrates skilled criteria  Anticipated Dischage Disposition: unknown  Rehab Potential/Prognosis, Swallowing: good, to achieve stated therapy goals  Therapy Frequency (Swallow): PRN  Predicted Duration Therapy Intervention (Days): until discharge       Plan of Care Reviewed With: patient, family, other (see comments)(Robe CHATMAN)  Plan of Care Review  Plan of Care Reviewed With: patient, family, other (see comments)(Robe CHATMAN)  Progress: (Eval)  Outcome Summary: Bedside swallow eval completed. Pt s/p extubation on 06/16/2019. Pt was presented a full range of consistencies excluding mechanical soft. Pt was noted to have decreased visual attention, especially on L, requiring verbal and tactile cues to increase awareness of presence of spoon and straw. Decreased labial seal on spoon, which initially resulted in inability to extract nectar thick bolus via straw, though efficient when cues were provided. No noted concerns with oral transit. Initially appeared to have weak laryngeal elevation with delayed swlalow initiation, yet improved with further trials. Impulsive with bolus size with thin via straw. Adequate rotary chew with regular solid with mild oral residue, cleared with a thin liquid wash. No overt s/s of aspiration. Cannot fully r/o aspiration at this time. RECOMMENDATIONS: D/C NPO status, ok to start regular solid diet consistency with regular/thin liquid consistency; meds whole with thin liquids; cue pt to encourage decreased impulsivity with thin liquids; RN to monitor for increased congestion; General feeding/aspiration precautions with PO. ST to monitor diet toleration PRN and to tx oral musculature strengthening tasks. ST will also monitor to eval speech-language/cognitive fx, secondary to observed dysarthria and cognitive  impairment, as well as visual impairment. Thanks!     SLP GOALS     Row Name 06/17/19 0843             Oral Nutrition/Hydration Goal 1 (SLP)    Oral Nutrition/Hydration Goal 1, SLP  Pt will tolerate LRD without overt s/s of aspiration.  -TM      Time Frame (Oral Nutrition/Hydration Goal 1, SLP)  by discharge  -TM      Barriers (Oral Nutrition/Hydration Goal 1, SLP)  Cognition   -TM      Progress/Outcomes (Oral Nutrition/Hydration Goal 1, SLP)  goal ongoing  -TM         Labial Strengthening Goal 1 (SLP)    Activity (Labial Strengthening Goal 1, SLP)  increase labial tone  -TM      Increase Labial Tone  labial resistance exercises  -TM      [place]/Accuracy (Labial Strengthening Goal 1, SLP)  independently (over 90% accuracy)  -TM      Time Frame (Labial Strengthening Goal 1, SLP)  by discharge  -TM      Barriers (Labial Strengthening Goal 1, SLP)  Cognition  -TM      Progress/Outcomes (Labial Strengthening Goal 1, SLP)  goal ongoing  -TM         Lingual Strengthening Goal 1 (SLP)    Activity (Lingual Strengthening Goal 1, SLP)  increase lingual tone/sensation/control/coordination/movement  -TM      Increase Lingual Tone/Sensation/Control/Coordination/Movement  lingual movement exercises  -TM      [place]/Accuracy (Lingual Strengthening Goal 1, SLP)  independently (over 90% accuracy)  -TM      Time Frame (Lingual Strengthening Goal 1, SLP)  by discharge  -TM      Barriers (Lingual Strengthening Goal 1, SLP)  Cognition  -TM      Progress/Outcomes (Lingual Strengthening Goal 1, SLP)  goal ongoing  -TM        User Key  (r) = Recorded By, (t) = Taken By, (c) = Cosigned By    Initials Name Provider Type    TM Amira Ricks CCC-SLP Speech and Language Pathologist             Time Calculation:   Time Calculation- SLP     Row Name 06/17/19 0945             Time Calculation- SLP    SLP Start Time  0843  -TM      SLP Stop Time  0922  -TM      SLP Time Calculation (min)  39 min  -TM      SLP Received On  06/17/19   -      SLP Goal Re-Cert Due Date  06/27/19  -        User Key  (r) = Recorded By, (t) = Taken By, (c) = Cosigned By    Initials Name Provider Type    Amira Sales CCC-SLP Speech and Language Pathologist          Therapy Charges for Today     Code Description Service Date Service Provider Modifiers Qty    [id number] HC ST EVAL ORAL PHARYNG SWALLOW 3 6/17/2019 Amira Ricks CCC-SLP GN 1               LEONIDES Lewis  6/17/2019

## 2019-06-17 NOTE — PROGRESS NOTES
Continued Stay Note   [place]     Patient Name: Lukasz Savage  MRN: [id number]  Today's Date: 6/17/2019    Admit Date: 6/14/2019    Discharge Plan     Row Name 06/17/19 1039       Plan    Plan  acute rehab vs SNF    Plan Comments  Patient is now extubated, remains in restraints.  Awaiting therapy evaluations.  Patient may require placement upon discharge.        Discharge Codes    No documentation.             HENRIETTA Nava

## 2019-06-17 NOTE — PROGRESS NOTES
"    [facility] Medicine Services  INPATIENT PROGRESS NOTE    Patient Name: Lukasz Savage  Date of Admission: 6/14/2019  Today's Date: 06/17/19  Length of Stay: 3  Primary Care Physician: Linda Hoffman, DNP, APRN    Subjective   Chief Complaint: \"doing better\"  HPI     Patient seen and examined at bedside.  Patient awake and alert but still somewhat confused.  Can not move and squeeze with left hand and moving left leg better.  Bedside swallow failed.  Will have speech evaluate.  Patient remains restrained as he is trying to climb out of bed and is a harm to himself.  Patient denies any complaints this morning.          Review of Systems   Constitutional: Positive for fatigue. Negative for activity change, appetite change, chills and fever.   Respiratory: Negative for cough and shortness of breath.    Cardiovascular: Negative for chest pain and palpitations.   Gastrointestinal: Negative for abdominal distention, abdominal pain, constipation, diarrhea, nausea and vomiting.   Neurological: Positive for weakness.        All pertinent negatives and positives are as above. All other systems have been reviewed and are negative unless otherwise stated.     Objective    Temp:  [97.5 °F (36.4 °C)-98.6 °F (37 °C)] 97.5 °F (36.4 °C)  Heart Rate:  [61-95] 65  Resp:  [12-19] 17  BP: (108-158)/() 116/77  FiO2 (%):  [30 %] 30 %  Physical Exam  Constitutional: No distress. He is on room air.    HENT:   Head: Normocephalic and atraumatic.   Eyes: Conjunctivae are normal. No scleral icterus.   Neck: Neck supple. No JVD present.   Cardiovascular: Normal rate and regular rhythm. Exam reveals no gallop and no friction rub.   No murmur heard.  Pulmonary/Chest: Effort normal and breath sounds normal. No stridor.No respiratory distress. He has no wheezes. He has no rales.   Abdominal: Soft. Bowel sounds are normal. He exhibits no distension and no mass. There is no tenderness. There is no " guarding.   Musculoskeletal: He exhibits no edema.   Neurological:   Restless.  Moving all extremities.  Strength on right upper and lower both proximal and distal is normal.  Weakness of left upper and lower both proximally and distally.    Skin: Skin is warm and dry. He is not diaphoretic. No erythema.   Nursing note and vitals reviewed.          Results Review:  I have reviewed the labs, radiology results, and diagnostic studies.    Laboratory Data:   Results from last 7 days   Lab Units 06/17/19  0515 06/16/19  0148 06/15/19  0228   WBC 10*3/mm3 15.58* 15.75* 13.92*   HEMOGLOBIN g/dL 11.4* 11.8* 11.9*   HEMATOCRIT % 33.9* 35.2* 35.2*   PLATELETS 10*3/mm3 195 196 185        Results from last 7 days   Lab Units 06/17/19  0515 06/16/19  0148 06/15/19  0228  06/14/19  0349   SODIUM mmol/L 140 141 143   < > 143   SODIUM, ARTERIAL   --   --   --    < >  --    POTASSIUM mmol/L 3.6 3.3* 3.9   < > 5.4*   CHLORIDE mmol/L 111* 114* 115*   < > 110   CO2 mmol/L 24.0 18.0* 20.0*   < > 28.0   BUN mg/dL 15 13 17   < > 27*   CREATININE mg/dL 1.08 1.07 1.21   < > 1.70*   CALCIUM mg/dL 8.4 8.1* 8.2*   < > 8.9   BILIRUBIN mg/dL 0.5  --  0.7  --  0.3   ALK PHOS U/L 57  --  50  --  64   ALT (SGPT) U/L 65*  --  48  --  35   AST (SGOT) U/L 46*  --  50*  --  39   GLUCOSE mg/dL 86 101* 68*   < > 113*    < > = values in this interval not displayed.       Culture Data:   Blood Culture   Date Value Ref Range Status   06/15/2019 No growth at 24 hours  Preliminary       Radiology Data:   Imaging Results (last 24 hours)     Procedure Component Value Units Date/Time    XR Chest 1 View [[id number]] Collected:  06/16/19 0759     Updated:  06/16/19 0803    Narrative:       Frontal upright radiograph of the chest 6/16/2019 3:49 AM CDT     COMPARISON: 06/15/2019.     FINDINGS:   The lungs are clear. The cardiac silhouettes upper limits of normal.  Nasogastric tube and endotracheal tube are satisfactorily position.      The osseous structures and  surrounding soft tissues demonstrate no acute  abnormality.       Impression:       1. No radiographic evidence of acute cardiopulmonary process.        This report was finalized on 06/16/2019 08:00 by Dr. Adolph Echols MD.          I have reviewed the patient's current medications.     Assessment/Plan     Active Hospital Problems    Diagnosis   • Anemia   • Leukocytosis   • Status epilepticus (CMS/HCC)   • Hyperkalemia   • Acute kidney injury (CMS/HCC) on chronic kidney disease stage 3   • Astrocytoma brain tumor (CMS/HCC)   • Seizures (CMS/HCC)     Plan:  1.  Continue ICU care   2.  Neurology, oncology, and neurosurgery consulted.  Appreciate assitance  3.  IVF   4.  Electrolytes stable  5.  AEDs per neurology  6.  Weaned to extubated 6/17  7.  AM CBC and BMP, magnesium, phosphorus  8.  D/C ramirez   9.  PPI for GI PPx (take PPI at home), enoxaparin for VTE prophylaxis  10.  BC x 2, sputum culture NGTD  11.  Decadron 6 mg IV q6h   12.  Vanc and zosyn for now given recent hospitalizations - Will d/c vancomycin, continue on zosyn for now.  13.  Neurochecks  14.  PT/OT/SLP       Case discussed with Dr. Romero and Robe CHATMAN.               Discharge Planning: I expect the patient to be discharged to ? In ? Days.     Jairon Webb MD   06/17/19   7:32 AM

## 2019-06-17 NOTE — PROGRESS NOTES
"    PROGRESS NOTE  Patient name: Lukasz Savage  Patient : 1965  VISIT # [id number]  MR #[id number]   CCU 9    SUBJECTIVE:  He continues in CCU.   \"Seizures\" resolved  More awake and stronger     INTERVAL HISTORY:  Mr. 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  He presented to the hospital with seizure activity and was admitted for treatment.  Medical oncology consultation was requested for continuity of care.     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 Trevino for diagnosis of primary brain tumor, grade 3 astrocytoma. The patient was being seen by neurology with complaints of left facial and upper extremity.     · 2019-MRI brain with contrast showed changes in the frontal convexity with a fullness of the sulcal gyral pattern. Specifically, 4.5 x 4.5 x 3.5 cm right frontal lesion. Second, discrete hyperintense nodule measuring 1.1 x 1.9 x 1.5 cm in the subcortical white matter of the paramedian posterior right frontal lobe immediately above the corpus callosum. This was concerning for high-grade glioma.   · 4/15/2019-he underwent a craniotomy with stereotactic biopsy by Dr. Dillon Trevino at [facility]. 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 Further molecular analysis at [facility] revealed IDH1/2 wild type, MGMT non-methylated, TERT mutation. Complex cytogenetics changes A maximum safe resection was not possible due to concerns of significant sequela. Second opinion was recommended at the [facility].   · 2019-he was seen by Dr. Oral Jessica. Recommended concurrent chemoradiation.   · 2019-he was first seen by " me. Recommended concurrent chemoradiation with Temodar.     REVIEW OF SYSTEMS:    Constitutional: no fever                      Lungs: positive intubation  CVS: no palpitation, no chest pain  GI: no abdominal pain, no nausea , no vomiting  DIPAK: no dysuria, frequency and urgency, no hematuria  Musculoskeletal: no joint pain, swelling , stiffness  Endocrine: no polyuria, polydipsia  Hematology: no anemia, no easy brusing or bleeding  Dermatology: no skin rash, no eczema, no pruritus  Neurology: positive for anaplastic glioma, seizures      OBJECTIVE:    Vitals:    06/17/19 0600   BP: 116/77   Pulse: 65   Resp: 17   Temp:    SpO2: 99%       Intake/Output Summary (Last 24 hours) at 6/17/2019 0736  Last data filed at 6/17/2019 0358  Gross per 24 hour   Intake 5253.5 ml   Output 3800 ml   Net 1453.5 ml     PHYSICAL EXAM:    CONSTITUTIONAL: Intubated,  in CCU   NECK: Supple, no masses   CHEST/LUNGS: CTA bilaterally, normal respiratory effort   CARDIOVASCULAR: RRR, no murmurs  ABDOMEN: soft non-tender, active bowel sounds, no HSM  EXTREMITIES: BLE SCD's  SKIN: warm, dry with no rashes or lesions      CBC  Results from last 7 days   Lab Units 06/17/19  0515 06/16/19  0148 06/15/19  0228   WBC 10*3/mm3 15.58* 15.75* 13.92*   HEMOGLOBIN g/dL 11.4* 11.8* 11.9*   HEMATOCRIT % 33.9* 35.2* 35.2*   PLATELETS 10*3/mm3 195 196 185         Lab Results   Component Value Date     06/17/2019    K 3.6 06/17/2019     (H) 06/17/2019    CO2 24.0 06/17/2019    BUN 15 06/17/2019    CREATININE 1.08 06/17/2019    GLUCOSE 86 06/17/2019    CALCIUM 8.4 06/17/2019    BILITOT 0.5 06/17/2019    ALKPHOS 57 06/17/2019    AST 46 (H) 06/17/2019    ALT 65 (H) 06/17/2019    AGRATIO 1.1 06/17/2019    GLOB 2.6 06/17/2019       Lab Results   Component Value Date    INR 0.98 06/03/2019    INR 0.94 03/22/2018    PROTIME 13.3 06/03/2019    PROTIME 12.8 03/22/2018       Cultures:    Lab Results   Component Value Date    BLOODCX No growth at 24 hours  06/15/2019     No components found for: URINCX    CT HEAD WO CONTRAST-  6/14/2019 7:55 AM CDT     HISTORY: Seizures, history of brain tumor previous right-sided frontal  craniotomy.     COMPARISONS: 06/03/2019 head CT      TECHNIQUE:     Radiation dose equals  mGy-cm.  Automated exposure control dose  reduction technique was implemented.        CT evaluation of the head without intravenous contrast. 5 mm transaxial  images were obtained.   2-D sagittal and coronal reconstruction images  were generated.     FINDINGS: Examination was sent to statrad for preliminary  interpretation.     Changes from right craniotomy observed.     The right sided high density lesion is again observed superiorly in the  right frontal lobe, near the midline, extending from the corpus callosum  was noted previously and is essentially unchanged. Second probable high  density lesion observed more laterally in the right frontal lobe near  the craniotomy defect. The CT appearance is most likely unchanged.     There is no mass effect or midline shift.     There is no hydrocephalus.     There is no developing hemorrhage.     Mucosal thickening observed in the ethmoid sinuses.           IMPRESSION:  1. Stable CT appearance of the head compared to 06/03/2019, as described  above.                              ASSESSMENT/PLAN:  Anaplastic glioma     He has completed 2 XRT treatment and did take Temodar for 2 days -- 6/12/19 and 6/13/19.  Temodar on hold at present     CBC today 6/17/2019  WBC 15.58  HGB 11.4  ,000        Seizure activity     CCU monitoring  Vimpat  Depakene  Keppra        Propofol was weaned off at 1700 hrs. on 6/15/2019.    No overt seizure activity on EEG.  Possible MRI today.    He is moving on the right side, left improving.      Goals of care- Dr. Doyle discussed with the family on 6/15/2019.  The family is not ready to make the patient DNR at this time. We discussed about the futility of sustaining  life-support for too long if seizures does not improve. They acknowledged understanding but would like to provide full support for now.     Delio Romero MD    06/17/19  7:36 AM

## 2019-06-17 NOTE — PLAN OF CARE
"Problem: Patient Care Overview  Goal: Plan of Care Review  Outcome: Ongoing (interventions implemented as appropriate)   06/17/19 0530   OTHER   Outcome Summary pt following commands, moving left leg well, left arm still weak but does  with it now, more agitated throughout the night with some confusion to situation and place, AED's all given IV, no c/o pain, remains in restraints, just \"wants to go home\", urine adequate, condom cath in place, would benefit with a sitter, continue to monitor   Coping/Psychosocial   Plan of Care Reviewed With patient;family   Plan of Care Review   Progress improving     Goal: Individualization and Mutuality  Outcome: Ongoing (interventions implemented as appropriate)    Goal: Discharge Needs Assessment  Outcome: Ongoing (interventions implemented as appropriate)    Goal: Interprofessional Rounds/Family Conf  Outcome: Ongoing (interventions implemented as appropriate)      Problem: Fall Risk (Adult)  Goal: Identify Related Risk Factors and Signs and Symptoms  Outcome: Ongoing (interventions implemented as appropriate)    Goal: Absence of Fall  Outcome: Ongoing (interventions implemented as appropriate)      Problem: Restraint, Nonbehavioral (Nonviolent)  Goal: Rationale and Justification  Outcome: Ongoing (interventions implemented as appropriate)    Goal: Nonbehavioral (Nonviolent) Restraint: Absence of Injury/Harm  Outcome: Ongoing (interventions implemented as appropriate)    Goal: Nonbehavioral (Nonviolent) Restraint: Achievement of Discontinuation Criteria  Outcome: Ongoing (interventions implemented as appropriate)    Goal: Nonbehavioral (Nonviolent) Restraint: Preservation of Dignity and Wellbeing  Outcome: Ongoing (interventions implemented as appropriate)      Problem: Seizure Disorder/Epilepsy (Adult)  Goal: Signs and Symptoms of Listed Potential Problems Will be Absent, Minimized or Managed (Seizure Disorder/Epilepsy)  Outcome: Ongoing (interventions implemented as " appropriate)      Problem: Skin Injury Risk (Adult)  Goal: Identify Related Risk Factors and Signs and Symptoms  Outcome: Ongoing (interventions implemented as appropriate)    Goal: Skin Health and Integrity  Outcome: Ongoing (interventions implemented as appropriate)

## 2019-06-17 NOTE — PLAN OF CARE
Problem: Patient Care Overview  Goal: Plan of Care Review  Outcome: Ongoing (interventions implemented as appropriate)   06/17/19 0942   OTHER   Outcome Summary Bedside swallow eval completed. Pt s/p extubation on 06/16/2019. Pt was presented a full range of consistencies excluding mechanical soft. Pt was noted to have decreased visual attention, especially on L, requiring verbal and tactile cues to increase awareness of presence of spoon and straw. Decreased labial seal on spoon, which initially resulted in inability to extract nectar thick bolus via straw, though efficient when cues were provided. No noted concerns with oral transit. Initially appeared to have weak laryngeal elevation with delayed swlalow initiation, yet improved with further trials. Impulsive with bolus size with thin via straw. Adequate rotary chew with regular solid with mild oral residue, cleared with a thin liquid wash. No overt s/s of aspiration. Cannot fully r/o aspiration at this time. RECOMMENDATIONS: D/C NPO status, ok to start regular solid diet consistency with regular/thin liquid consistency; meds whole with thin liquids; cue pt to encourage decreased impulsivity with thin liquids; RN to monitor for increased congestion; General feeding/aspiration precautions with PO. ST to monitor diet toleration PRN and to tx oral musculature strengthening tasks. ST will also monitor to eval speech-language/cognitive fx, secondary to observed dysarthria and cognitive impairment, as well as visual impairment. Thanks!    Coping/Psychosocial   Plan of Care Reviewed With patient;family;other (see comments)  (RN, Robe)   Plan of Care Review   Progress (Eval)

## 2019-06-17 NOTE — PROGRESS NOTES
"Lukasz Savage  54 y.o.      Chief complaint:   Seizures, brain tumor.  Patient has been extubated and does not appear to be in any respiratory distress.    Subjective:  Symptoms:  Stable.    Diet:  NPO.    Activity level: Returning to normal.    Pain:  He reports no pain.      No events    Temp:  [97.5 °F (36.4 °C)-98.6 °F (37 °C)] 97.5 °F (36.4 °C)  Heart Rate:  [61-95] 65  Resp:  [12-19] 17  BP: (108-158)/() 116/77  FiO2 (%):  [30 %] 30 %      Objective:  General Appearance:  Comfortable, well-appearing, in no acute distress and not in pain.    Vital signs: (most recent): Blood pressure 116/77, pulse 65, temperature 97.5 °F (36.4 °C), temperature source Axillary, resp. rate 17, height 172.7 cm (68\"), weight 86.8 kg (191 lb 4.8 oz), SpO2 99 %.  Vital signs are normal.  No fever.    Output: Producing urine.    HEENT: Normal HEENT exam.    Lungs:  Normal effort and normal respiratory rate.  He is not in respiratory distress.    Heart: Normal rate.  Regular rhythm.    Chest: Symmetric chest wall expansion.   Extremities: Normal range of motion.    Skin:  Warm and dry.    Abdomen: Abdomen is soft and non-distended.  Bowel sounds are normal.   There is no abdominal tenderness.     Pulses: Distal pulses are intact.        Neurologic Exam     Mental Status   Oriented to person, place, and time.   Attention: normal. Concentration: normal.   Speech: speech is normal   Level of consciousness: alert    Cranial Nerves   Cranial nerves II through XII intact.     Motor Exam   Muscle bulk: normal    Strength   Strength 5/5 throughout.     Sensory Exam   Light touch normal.         Seizures (CMS/HCC)    Astrocytoma brain tumor (CMS/HCC)    Status epilepticus (CMS/HCC)    Hyperkalemia    Acute kidney injury (CMS/HCC) on chronic kidney disease stage 3    Leukocytosis    Anemia      Lab Results (last 24 hours)     Procedure Component Value Units Date/Time    Comprehensive Metabolic Panel [[id number]]  (Abnormal) Collected: "  06/17/19 0515    Specimen:  Blood Updated:  06/17/19 0607     Glucose 86 mg/dL      BUN 15 mg/dL      Creatinine 1.08 mg/dL      Sodium 140 mmol/L      Potassium 3.6 mmol/L      Chloride 111 mmol/L      CO2 24.0 mmol/L      Calcium 8.4 mg/dL      Total Protein 5.5 g/dL      Albumin 2.90 g/dL      ALT (SGPT) 65 U/L      AST (SGOT) 46 U/L      Alkaline Phosphatase 57 U/L      Total Bilirubin 0.5 mg/dL      eGFR Non African Amer 72 mL/min/1.73      Globulin 2.6 gm/dL      A/G Ratio 1.1 g/dL      BUN/Creatinine Ratio 13.9     Anion Gap 5.0 mmol/L     Narrative:       GFR Normal >60  Chronic Kidney Disease <60  Kidney Failure <15    Valproic Acid Level, Total [[id number]]  (Abnormal) Collected:  06/17/19 0515    Specimen:  Blood Updated:  06/17/19 0559     Valproic Acid 39.5 mcg/mL     CBC & Differential [[id number]] Collected:  06/17/19 0515    Specimen:  Blood Updated:  06/17/19 0536    Narrative:       The following orders were created for panel order CBC & Differential.  Procedure                               Abnormality         Status                     ---------                               -----------         ------                     CBC Auto Differential[[id number]]        Abnormal            Final result                 Please view results for these tests on the individual orders.    CBC Auto Differential [[id number]]  (Abnormal) Collected:  06/17/19 0515    Specimen:  Blood Updated:  06/17/19 0536     WBC 15.58 10*3/mm3      RBC 3.98 10*6/mm3      Hemoglobin 11.4 g/dL      Hematocrit 33.9 %      MCV 85.2 fL      MCH 28.6 pg      MCHC 33.6 g/dL      RDW 15.1 %      RDW-SD 47.3 fl      MPV 11.4 fL      Platelets 195 10*3/mm3      Neutrophil % 83.4 %      Lymphocyte % 11.0 %      Monocyte % 4.8 %      Eosinophil % 0.1 %      Basophil % 0.1 %      Immature Grans % 0.6 %      Neutrophils, Absolute 13.00 10*3/mm3      Lymphocytes, Absolute 1.71 10*3/mm3      Monocytes, Absolute 0.75 10*3/mm3      Eosinophils,  Absolute 0.01 10*3/mm3      Basophils, Absolute 0.01 10*3/mm3      Immature Grans, Absolute 0.10 10*3/mm3      nRBC 0.0 /100 WBC     Blood Culture - Blood, Arm, Left [[id number]] Collected:  06/15/19 2206    Specimen:  Blood from Arm, Left Updated:  06/16/19 2230     Blood Culture No growth at 24 hours    Respiratory Culture - Sputum, ET Suction [[id number]] Collected:  06/16/19 1406    Specimen:  Sputum from ET Suction Updated:  06/16/19 1650    Procalcitonin [[id number]]  (Normal) Collected:  06/16/19 0148    Specimen:  Blood Updated:  06/16/19 0839     Procalcitonin <0.25 ng/mL     Narrative:       SIRS, sepsis, severe sepsis, and septic shock are categorized according to the criteria of the consensus conference of the American College of Chest Physicians/Society of Critical Care Medicine.    PCT < 0.5 ng/mL     Systemic infection (sepsis) is not likely.    PCT >0.5 and < 2.0 ng/mL Systemic infection (sepsis) is possible, but other conditions are known to elevate PCT as well.    PCT > 2.0 ng/mL     Systemic infection (sepsis) is likely, unless other causes are known.      PCT > 10.0 ng/mL    Important systemic inflammatory response, almost exclusively due to severe bacterial sepsis or septic shock.    PCT values of < 0.5 ng/mL do not exclude an infection, because localized infections (without systemic signs) may be associated with such low concentrations, or a systemic infection in its initial stages (<6 hours).  Increased PCT can occur without infection.  PCT concentrations between 0.5 and 2.0 ng/mL should be interpreted taking into account the patients history.  It is recommended to retest PCT within 6-24 hours if any concentrations < 2.0 ng/mL are obtained.              Plan:   Heart rate and blood pressure stable  Oxygen level stable  N.p.o.  Adequate urine output  Neuro exam stable, mild disorientation  Okay from neurosurgical standpoint to be transferred to the floor when okay with admitting    Tommie HALL  Hillary, EPI

## 2019-06-17 NOTE — PLAN OF CARE
Patient Name: Lisbet Foley  Procedure Date: 3/11/2019 2:38 PM  MRN: [id number]  Account Number: [id number]  YOB: 1951  Age: 67  Attending MD: Ke Meredith MD  Grafts or Implants: No  Procedure:            Upper GI endoscopy  Indications:          Heartburn  Providers:            Ke Meredith MD  Referring MD:         Dima Brown MD  Sedation:             Propofol per Anesthesia  Procedure:       Pre-Anesthesia Assessment:       - Prior to the procedure, a History and Physical was performed, and       patient medications and allergies were reviewed. The patient's tolerance       of previous anesthesia was also reviewed. The risks and benefits of the       procedure and the sedation options and risks were discussed with the       patient. All questions were answered, and informed consent was obtained.       Prior Anticoagulants: The patient has taken no previous anticoagulant or       antiplatelet agents. ASA Grade Assessment: III - A patient with severe       systemic disease. After reviewing the risks and benefits, the patient       was deemed in satisfactory condition to undergo the procedure.       The endoscope was passed under direct vision. The endoscope was       introduced through the mouth, and advanced to the second part of       duodenum. The physical status of the patient was re-assessed after the       procedure. The upper GI endoscopy was accomplished without difficulty.       The patient tolerated the procedure well.  Findings:       LA Grade B (one or more mucosal breaks greater than 5 mm, not extending       between the tops of two mucosal folds) esophagitis with no bleeding was       found 36 to 37 cm from the incisors. Biopsies were taken with a cold       forceps for histology. Verification of patient identification for the       specimen was done by the physician, nurse and technician. Estimated       blood loss: none.       Multiple 5 mm plaques were found in the  Problem: Patient Care Overview  Goal: Plan of Care Review  Outcome: Ongoing (interventions implemented as appropriate)   06/17/19 1960   OTHER   Outcome Summary OT eval completed. pt reports no pain. Pt's daughter reports recent L rib fractures. Pt is impulsive and has decreased processing. pt has L inattention. Pt has decreased coordination on L side. Pt CGA x2 for functional mobility with verbal cues. Pt max A to kaur socks. Skilled OT recommended to address adls, functional mobility and education. Recommended d/c home with assist and HH/OP pending pt progress.   Coping/Psychosocial   Plan of Care Reviewed With patient;spouse   Plan of Care Review   Progress improving          middle third of the esophagus,       24 cm from the incisors. Biopsies were taken with a cold forceps for       histology. Verification of patient identification for the specimen was       done by the physician, nurse and technician. Estimated blood loss: none.  Impression:       - LA Grade B reflux esophagitis. Rule out Roa's esophagus. Biopsied.       - Multiple plaques in the middle third of the esophagus. Biopsied.  Recommendation:       - Patient has a contact number available for emergencies. The signs and       symptoms of potential delayed complications were discussed with the       patient. Return to normal activities tomorrow. Written discharge       instructions were provided to the patient.       - Resume previous diet today.       - Await pathology results.  Complications:        No immediate complications.  Estimated Blood Loss: Estimated blood loss: none.  MD Ke Ramos MD  3/11/2019 3:53:37 PM  This report has been signed electronically by the above.  Number of Addenda: 0

## 2019-06-17 NOTE — PROGRESS NOTES
Neurology Progress Note      Date of admission: 6/14/2019  3:46 AM  Date of visit: 6/17/2019    Chief Complaint:  Seizure    Subjective     Subjective:    He walked the [place] today with PT and he ate without difficulties and he is now cleared for liquids. Was wild last night  But doing better today. He is still with some cognitive issues and speech difficulty  Medications:  Current Facility-Administered Medications   Medication Dose Route Frequency Provider Last Rate Last Dose   • acetaminophen (TYLENOL) tablet 650 mg  650 mg Oral Q4H PRN Robe Dorsey DO        Or   • acetaminophen (TYLENOL) suppository 650 mg  650 mg Rectal Q4H PRN Robe Dorsey DO       • chlorhexidine (PERIDEX) 0.12 % solution 15 mL  15 mL Mouth/Throat Q12H Robe Dorsey DO   15 mL at 06/16/19 2114   • dexamethasone (DECADRON) injection 6 mg  6 mg Intravenous Q6H Dillon Trevino MD   6 mg at 06/17/19 1319   • enoxaparin (LOVENOX) syringe 40 mg  40 mg Subcutaneous Q24H Jairon Webb MD   40 mg at 06/17/19 0828   • lacosamide (VIMPAT) 150 mg in sodium chloride 0.9 % 50 mL IVPB  150 mg Intravenous Q12H Doreen Zuniga  mL/hr at 06/17/19 0141 150 mg at 06/17/19 0141   • levETIRAcetam (KEPPRA) 1,250 mg in sodium chloride 0.9 % 100 mL IVPB  1,250 mg Intravenous Q12H Doreen Zuniga MD   1,250 mg at 06/17/19 0828   • LORazepam (ATIVAN) injection 1 mg  1 mg Intravenous Q5 Min PRN Jairon Webb MD   1 mg at 06/15/19 0135   • ondansetron (ZOFRAN) injection 4 mg  4 mg Intravenous Q6H PRN Robe Dorsey DO       • [START ON 6/18/2019] pantoprazole (PROTONIX) EC tablet 40 mg  40 mg Oral Q AM Jairon Webb MD       • piperacillin-tazobactam (ZOSYN) 4.5 g in iso-osmotic dextrose 100 mL IVPB (premix)  4.5 g Intravenous Q8H Jairon Webb MD   4.5 g at 06/17/19 1319   • potassium chloride (KAYCIEL) 20 MEQ/15ML (10%) solution 40 mEq  40 mEq Oral Daily Jairon Webb MD   40 mEq at  "06/16/19 1001   • QUEtiapine (SEROquel) tablet 50 mg  50 mg Oral Nightly Jairon Webb MD       • sodium chloride 0.9 % flush 3 mL  3 mL Intravenous Q12H Robe Dorsey DO   3 mL at 06/17/19 0829   • sodium chloride 0.9 % flush 3-10 mL  3-10 mL Intravenous PRN Robe Dorsey DO       • sodium chloride 0.9 % infusion  100 mL/hr Intravenous Continuous Jairon Webb  mL/hr at 06/17/19 1326 100 mL/hr at 06/17/19 1326   • valproate (DEPACON) 750 mg in sodium chloride 0.9 % 100 mL IVPB  750 mg Intravenous Q6H Doreen Zuniga MD   750 mg at 06/17/19 1319   • venlafaxine XR (EFFEXOR-XR) 24 hr capsule 150 mg  150 mg Oral Daily Jairon Webb MD   150 mg at 06/17/19 1213       Review of Systems:   -A 14 point review of systems is completed and is negative except for wanting to go home, speech and cognitive issues    Objective     Objective      Vital Signs  Temp:  [97.5 °F (36.4 °C)-98.6 °F (37 °C)] 97.6 °F (36.4 °C)  Heart Rate:  [50-95] 67  Resp:  [12-19] 13  BP: ()/() 114/74  FiO2 (%):  [30 %] 30 %    Physical Exam:    HEENT:  Neck supple  CVS:  Regular rate and rhythm.  No murmurs  Carotid Examination:  No bruits  Lungs:  Clear to auscultation  Abdomen:  Non-tender, Non-distended  Extremities:  No signs of peripheral edema    Neurologic Exam:    -Awake, Alert, Oriented to person place and thinks its July but not able to come up with that name--could say '&\" Did get the year \"19\" but could not come up with \"1019\"  -Some word finding difficulties  -Some dysarthria  -Follows simple and complex commands    Cranial nerves II through XII intact.EOMI No facial asymmetry. No clear motor asymmetry to face but there was a slight lag in smile on the left.     Motor: (strength out of 5:  1= minimal movement, 2 = movement in plane of gravity, 3 = movement against gravity, 4 = movement against some resistance, 5 = full strength)    -Right Upper Ext: Proximal: 5 Distal: 5  -Left Upper " Ext: Proximal: 5 Distal: 5    -Right Lower Ext: Proximal: 5 Distal: 5  -Left Lower Ext: Proximal: 5 Distal: 5    DTR:  2+ throughout in all four extremities    Sensory:  -Intact to light touch, pinprick  Coordination/Gait:  -No ataxia--Walked around unit and slightly off balanced--mildly broad based but needing  assistance.      Results Review:    I reviewed the patient's new clinical results.    Lab Results (last 24 hours)     Procedure Component Value Units Date/Time    Respiratory Culture - Sputum, ET Suction [[id number]] Collected:  06/16/19 1406    Specimen:  Sputum from ET Suction Updated:  06/17/19 1056     Respiratory Culture Light growth (2+) Normal Respiratory [place]    Comprehensive Metabolic Panel [[id number]]  (Abnormal) Collected:  06/17/19 0515    Specimen:  Blood Updated:  06/17/19 0607     Glucose 86 mg/dL      BUN 15 mg/dL      Creatinine 1.08 mg/dL      Sodium 140 mmol/L      Potassium 3.6 mmol/L      Chloride 111 mmol/L      CO2 24.0 mmol/L      Calcium 8.4 mg/dL      Total Protein 5.5 g/dL      Albumin 2.90 g/dL      ALT (SGPT) 65 U/L      AST (SGOT) 46 U/L      Alkaline Phosphatase 57 U/L      Total Bilirubin 0.5 mg/dL      eGFR Non African Amer 72 mL/min/1.73      Globulin 2.6 gm/dL      A/G Ratio 1.1 g/dL      BUN/Creatinine Ratio 13.9     Anion Gap 5.0 mmol/L     Narrative:       GFR Normal >60  Chronic Kidney Disease <60  Kidney Failure <15    Valproic Acid Level, Total [[id number]]  (Abnormal) Collected:  06/17/19 0515    Specimen:  Blood Updated:  06/17/19 0559     Valproic Acid 39.5 mcg/mL     CBC & Differential [[id number]] Collected:  06/17/19 0515    Specimen:  Blood Updated:  06/17/19 0536    Narrative:       The following orders were created for panel order CBC & Differential.  Procedure                               Abnormality         Status                     ---------                               -----------         ------                     CBC Auto Differential[[id number]]         Abnormal            Final result                 Please view results for these tests on the individual orders.    CBC Auto Differential [[id number]]  (Abnormal) Collected:  06/17/19 0515    Specimen:  Blood Updated:  06/17/19 0536     WBC 15.58 10*3/mm3      RBC 3.98 10*6/mm3      Hemoglobin 11.4 g/dL      Hematocrit 33.9 %      MCV 85.2 fL      MCH 28.6 pg      MCHC 33.6 g/dL      RDW 15.1 %      RDW-SD 47.3 fl      MPV 11.4 fL      Platelets 195 10*3/mm3      Neutrophil % 83.4 %      Lymphocyte % 11.0 %      Monocyte % 4.8 %      Eosinophil % 0.1 %      Basophil % 0.1 %      Immature Grans % 0.6 %      Neutrophils, Absolute 13.00 10*3/mm3      Lymphocytes, Absolute 1.71 10*3/mm3      Monocytes, Absolute 0.75 10*3/mm3      Eosinophils, Absolute 0.01 10*3/mm3      Basophils, Absolute 0.01 10*3/mm3      Immature Grans, Absolute 0.10 10*3/mm3      nRBC 0.0 /100 WBC     Blood Culture - Blood, Arm, Left [[id number]] Collected:  06/15/19 2206    Specimen:  Blood from Arm, Left Updated:  06/16/19 2230     Blood Culture No growth at 24 hours        Imaging Results (last 24 hours)     ** No results found for the last 24 hours. **          Assessment/Plan     Hospital Problem List      Seizures (CMS/HCC)    Astrocytoma brain tumor (CMS/HCC)    Status epilepticus (CMS/HCC)    Hyperkalemia    Acute kidney injury (CMS/HCC) on chronic kidney disease stage 3    Leukocytosis    Anemia    Impression:  1. Reported seizures and on 3 AEDs  2. Valproate level dropped--missed a dose of his anticonvulsant yesterday  3. 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  4. Was agitated and requiring restraints so MRI held But now can likely cooperate  5. Slight subtle left facial twitch--per family this is his seizure.  Cannot state this is seizure from looking at it However is so localized and small it may not be picked up on EEG.     Plan:  · MRI of brain  · Change all AED meds to PO  · Extra dose of  Valproate today      35 minutes of critical care time was performed ,during this time I consulted with the nursing staff and also with other providers in regard to the patient's care and examined the patient. I talked with the family about test results and answered all of their questions.    Doreen Parker MD  06/17/19  1:27 PM

## 2019-06-18 LAB
ANION GAP SERPL CALCULATED.3IONS-SCNC: 7 MMOL/L (ref 4–13)
BUN BLD-MCNC: 20 MG/DL (ref 5–21)
BUN/CREAT SERPL: 18.3 (ref 7–25)
CALCIUM SPEC-SCNC: 8.3 MG/DL (ref 8.4–10.4)
CHLORIDE SERPL-SCNC: 112 MMOL/L (ref 98–110)
CO2 SERPL-SCNC: 22 MMOL/L (ref 24–31)
CREAT BLD-MCNC: 1.09 MG/DL (ref 0.5–1.4)
DEPRECATED RDW RBC AUTO: 47.1 FL (ref 40–54)
ERYTHROCYTE [DISTWIDTH] IN BLOOD BY AUTOMATED COUNT: 15.3 % (ref 12–15)
GFR SERPL CREATININE-BSD FRML MDRD: 71 ML/MIN/1.73
GLUCOSE BLD-MCNC: 102 MG/DL (ref 70–100)
HCT VFR BLD AUTO: 32.6 % (ref 40–52)
HGB BLD-MCNC: 11 G/DL (ref 14–18)
MAGNESIUM SERPL-MCNC: 1.8 MG/DL (ref 1.4–2.2)
MCH RBC QN AUTO: 28.6 PG (ref 28–32)
MCHC RBC AUTO-ENTMCNC: 33.7 G/DL (ref 33–36)
MCV RBC AUTO: 84.9 FL (ref 82–95)
PHOSPHATE SERPL-MCNC: 3.4 MG/DL (ref 2.5–4.5)
PLATELET # BLD AUTO: 178 10*3/MM3 (ref 130–400)
PMV BLD AUTO: 11.3 FL (ref 6–12)
POTASSIUM BLD-SCNC: 3.8 MMOL/L (ref 3.5–5.3)
RBC # BLD AUTO: 3.84 10*6/MM3 (ref 4.8–5.9)
SODIUM BLD-SCNC: 141 MMOL/L (ref 135–145)
VALPROATE SERPL-MCNC: 32.4 MCG/ML (ref 50–100)
WBC NRBC COR # BLD: 9.12 10*3/MM3 (ref 4.8–10.8)

## 2019-06-18 PROCEDURE — 97116 GAIT TRAINING THERAPY: CPT

## 2019-06-18 PROCEDURE — 77412 RADIATION TX DELIVERY LVL 3: CPT | Performed by: RADIOLOGY

## 2019-06-18 PROCEDURE — 97535 SELF CARE MNGMENT TRAINING: CPT | Performed by: OCCUPATIONAL THERAPIST

## 2019-06-18 PROCEDURE — 97110 THERAPEUTIC EXERCISES: CPT

## 2019-06-18 PROCEDURE — 84100 ASSAY OF PHOSPHORUS: CPT | Performed by: INTERNAL MEDICINE

## 2019-06-18 PROCEDURE — 92526 ORAL FUNCTION THERAPY: CPT | Performed by: SPEECH-LANGUAGE PATHOLOGIST

## 2019-06-18 PROCEDURE — 80048 BASIC METABOLIC PNL TOTAL CA: CPT | Performed by: INTERNAL MEDICINE

## 2019-06-18 PROCEDURE — 83735 ASSAY OF MAGNESIUM: CPT | Performed by: INTERNAL MEDICINE

## 2019-06-18 PROCEDURE — 80164 ASSAY DIPROPYLACETIC ACD TOT: CPT | Performed by: PSYCHIATRY & NEUROLOGY

## 2019-06-18 PROCEDURE — 99024 POSTOP FOLLOW-UP VISIT: CPT | Performed by: NURSE PRACTITIONER

## 2019-06-18 PROCEDURE — 25010000002 DEXAMETHASONE PER 1 MG: Performed by: NEUROLOGICAL SURGERY

## 2019-06-18 PROCEDURE — 25010000002 PIPERACILLIN SOD-TAZOBACTAM PER 1 G: Performed by: INTERNAL MEDICINE

## 2019-06-18 PROCEDURE — 25010000002 ENOXAPARIN PER 10 MG: Performed by: INTERNAL MEDICINE

## 2019-06-18 PROCEDURE — 85027 COMPLETE CBC AUTOMATED: CPT | Performed by: INTERNAL MEDICINE

## 2019-06-18 PROCEDURE — 99233 SBSQ HOSP IP/OBS HIGH 50: CPT | Performed by: PSYCHIATRY & NEUROLOGY

## 2019-06-18 RX ORDER — OXYCODONE HYDROCHLORIDE AND ACETAMINOPHEN 5; 325 MG/1; MG/1
2 TABLET ORAL EVERY 6 HOURS PRN
Status: DISCONTINUED | OUTPATIENT
Start: 2019-06-18 | End: 2019-06-20 | Stop reason: HOSPADM

## 2019-06-18 RX ADMIN — DEXAMETHASONE SODIUM PHOSPHATE 6 MG: 10 INJECTION INTRAMUSCULAR; INTRAVENOUS at 18:01

## 2019-06-18 RX ADMIN — LEVETIRACETAM 1250 MG: 500 TABLET, FILM COATED ORAL at 08:51

## 2019-06-18 RX ADMIN — SODIUM CHLORIDE 100 ML/HR: 9 INJECTION, SOLUTION INTRAVENOUS at 01:03

## 2019-06-18 RX ADMIN — SODIUM CHLORIDE, PRESERVATIVE FREE 3 ML: 5 INJECTION INTRAVENOUS at 20:36

## 2019-06-18 RX ADMIN — LACOSAMIDE 150 MG: 50 TABLET, FILM COATED ORAL at 06:03

## 2019-06-18 RX ADMIN — LACOSAMIDE 150 MG: 50 TABLET, FILM COATED ORAL at 20:36

## 2019-06-18 RX ADMIN — DIVALPROEX SODIUM 750 MG: 500 TABLET, DELAYED RELEASE ORAL at 11:37

## 2019-06-18 RX ADMIN — LEVETIRACETAM 1250 MG: 500 TABLET, FILM COATED ORAL at 20:36

## 2019-06-18 RX ADMIN — ENOXAPARIN SODIUM 40 MG: 40 INJECTION SUBCUTANEOUS at 08:51

## 2019-06-18 RX ADMIN — SODIUM CHLORIDE, PRESERVATIVE FREE 3 ML: 5 INJECTION INTRAVENOUS at 08:52

## 2019-06-18 RX ADMIN — OXYCODONE HYDROCHLORIDE AND ACETAMINOPHEN 2 TABLET: 5; 325 TABLET ORAL at 11:35

## 2019-06-18 RX ADMIN — DIVALPROEX SODIUM 750 MG: 500 TABLET, DELAYED RELEASE ORAL at 06:03

## 2019-06-18 RX ADMIN — DEXAMETHASONE SODIUM PHOSPHATE 6 MG: 10 INJECTION INTRAMUSCULAR; INTRAVENOUS at 00:00

## 2019-06-18 RX ADMIN — DEXAMETHASONE SODIUM PHOSPHATE 6 MG: 10 INJECTION INTRAMUSCULAR; INTRAVENOUS at 06:03

## 2019-06-18 RX ADMIN — POTASSIUM CHLORIDE 40 MEQ: 20 SOLUTION ORAL at 08:52

## 2019-06-18 RX ADMIN — QUETIAPINE FUMARATE 50 MG: 25 TABLET, FILM COATED ORAL at 20:35

## 2019-06-18 RX ADMIN — TAZOBACTAM SODIUM AND PIPERACILLIN SODIUM 4.5 G: 500; 4 INJECTION, SOLUTION INTRAVENOUS at 06:03

## 2019-06-18 RX ADMIN — DIVALPROEX SODIUM 750 MG: 500 TABLET, DELAYED RELEASE ORAL at 18:01

## 2019-06-18 RX ADMIN — PANTOPRAZOLE SODIUM 40 MG: 40 TABLET, DELAYED RELEASE ORAL at 06:03

## 2019-06-18 RX ADMIN — VENLAFAXINE HYDROCHLORIDE 150 MG: 75 CAPSULE, EXTENDED RELEASE ORAL at 08:51

## 2019-06-18 RX ADMIN — DEXAMETHASONE SODIUM PHOSPHATE 6 MG: 10 INJECTION INTRAMUSCULAR; INTRAVENOUS at 11:38

## 2019-06-18 NOTE — NURSING NOTE
Per the flowsheet charting all IV access was documented to be in the right arm but were actually in the right arm documentation was corrected.

## 2019-06-18 NOTE — PLAN OF CARE
Problem: Patient Care Overview  Goal: Plan of Care Review  Outcome: Ongoing (interventions implemented as appropriate)   06/18/19 1610   OTHER   Outcome Summary VSS. No seizure activity. pain treated with prn meds. Pt ambulating to BR with assistance. Pt confused to time only. Saftey maintained, will continue to monitor   Coping/Psychosocial   Plan of Care Reviewed With patient   Plan of Care Review   Progress improving     Goal: Individualization and Mutuality  Outcome: Ongoing (interventions implemented as appropriate)    Goal: Discharge Needs Assessment  Outcome: Ongoing (interventions implemented as appropriate)    Goal: Interprofessional Rounds/Family Conf  Outcome: Ongoing (interventions implemented as appropriate)      Problem: Fall Risk (Adult)  Goal: Identify Related Risk Factors and Signs and Symptoms  Outcome: Ongoing (interventions implemented as appropriate)    Goal: Absence of Fall  Outcome: Ongoing (interventions implemented as appropriate)      Problem: Seizure Disorder/Epilepsy (Adult)  Goal: Signs and Symptoms of Listed Potential Problems Will be Absent, Minimized or Managed (Seizure Disorder/Epilepsy)  Outcome: Ongoing (interventions implemented as appropriate)      Problem: Skin Injury Risk (Adult)  Goal: Identify Related Risk Factors and Signs and Symptoms  Outcome: Ongoing (interventions implemented as appropriate)    Goal: Skin Health and Integrity  Outcome: Ongoing (interventions implemented as appropriate)      Problem: Pain, Chronic (Adult)  Goal: Identify Related Risk Factors and Signs and Symptoms  Outcome: Ongoing (interventions implemented as appropriate)    Goal: Acceptable Pain/Comfort Level and Functional Ability  Outcome: Ongoing (interventions implemented as appropriate)

## 2019-06-18 NOTE — PLAN OF CARE
Problem: Restraint, Nonbehavioral (Nonviolent)  Goal: Rationale and Justification  Outcome: Revised

## 2019-06-18 NOTE — PLAN OF CARE
Problem: Skin Injury Risk (Adult)  Goal: Skin Health and Integrity  Outcome: Ongoing (interventions implemented as appropriate)   06/18/19 5596   Skin Injury Risk (Adult)   Skin Health and Integrity making progress toward outcome

## 2019-06-18 NOTE — PLAN OF CARE
Problem: Patient Care Overview  Goal: Plan of Care Review  Outcome: Ongoing (interventions implemented as appropriate)   06/18/19 6136   OTHER   Outcome Summary OT tx completed. Pt completed self-feeding task for increased coordination and motor planning of LUE. Min A opening packages. Set-up and verbal cues for fork and spoon use and bringing liquids to mouth, in L hand, Verbal cues for use of LUE in task. Mod spillage noted with self-feeding. Continue OT POC.    Coping/Psychosocial   Plan of Care Reviewed With patient   Plan of Care Review   Progress improving

## 2019-06-18 NOTE — PROGRESS NOTES
"    PROGRESS NOTE  Patient name: Lukasz Savage  Patient : 1965  VISIT # [id number]  MR #[id number]   CCU 9    SUBJECTIVE:  He continues in CCU.   \"Seizures\" resolved  More awake and stronger     INTERVAL HISTORY:  Mr. 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  He presented to the hospital with seizure activity and was admitted for treatment.  Medical oncology consultation was requested for continuity of care.     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 Trevino for diagnosis of primary brain tumor, grade 3 astrocytoma. The patient was being seen by neurology with complaints of left facial and upper extremity.     · 2019-MRI brain with contrast showed changes in the frontal convexity with a fullness of the sulcal gyral pattern. Specifically, 4.5 x 4.5 x 3.5 cm right frontal lesion. Second, discrete hyperintense nodule measuring 1.1 x 1.9 x 1.5 cm in the subcortical white matter of the paramedian posterior right frontal lobe immediately above the corpus callosum. This was concerning for high-grade glioma.   · 4/15/2019-he underwent a craniotomy with stereotactic biopsy by Dr. Dillon Trevino at [facility]. 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 Further molecular analysis at [facility] revealed IDH1/2 wild type, MGMT non-methylated, TERT mutation. Complex cytogenetics changes A maximum safe resection was not possible due to concerns of significant sequela. Second opinion was recommended at the [facility].   · 2019-he was seen by Dr. Oral Jessica. Recommended concurrent chemoradiation.   · 2019-he was first seen by " me. Recommended concurrent chemoradiation with Temodar.     REVIEW OF SYSTEMS:    Constitutional: no fever                      Lungs: normal respiratory effort     CVS: no palpitation, no chest pain  GI: no abdominal pain, no nausea , no vomiting  DIPAK: no dysuria, frequency and urgency, no hematuria  Musculoskeletal: no joint pain, swelling , stiffness  Endocrine: no polyuria, polydipsia  Hematology: Anemia, no easy brusing or bleeding  Dermatology: no skin rash, no eczema, no pruritus  Neurology: positive for anaplastic glioma, seizures      OBJECTIVE:    Vitals:    06/18/19 0502   BP: 129/94   Pulse: 55   Resp: 12   Temp:    SpO2: 96%       Intake/Output Summary (Last 24 hours) at 6/18/2019 0730  Last data filed at 6/18/2019 0400  Gross per 24 hour   Intake 2596.53 ml   Output 1001 ml   Net 1595.53 ml     PHYSICAL EXAM:    CONSTITUTIONAL: Feeling better, no seizure activity   NECK: Supple, no masses   CHEST/LUNGS: CTA bilaterally, normal respiratory effort   CARDIOVASCULAR: RRR, no murmurs  ABDOMEN: soft non-tender, active bowel sounds, no HSM  EXTREMITIES: BLE SCD's  SKIN: warm, dry with no rashes or lesions      CBC  Results from last 7 days   Lab Units 06/18/19  0330 06/17/19  0515 06/16/19  0148   WBC 10*3/mm3 9.12 15.58* 15.75*   HEMOGLOBIN g/dL 11.0* 11.4* 11.8*   HEMATOCRIT % 32.6* 33.9* 35.2*   PLATELETS 10*3/mm3 178 195 196         Lab Results   Component Value Date     06/18/2019    K 3.8 06/18/2019     (H) 06/18/2019    CO2 22.0 (L) 06/18/2019    BUN 20 06/18/2019    CREATININE 1.09 06/18/2019    GLUCOSE 102 (H) 06/18/2019    CALCIUM 8.3 (L) 06/18/2019    BILITOT 0.5 06/17/2019    ALKPHOS 57 06/17/2019    AST 46 (H) 06/17/2019    ALT 65 (H) 06/17/2019    AGRATIO 1.1 06/17/2019    GLOB 2.6 06/17/2019       Lab Results   Component Value Date    INR 0.98 06/03/2019    INR 0.94 03/22/2018    PROTIME 13.3 06/03/2019    PROTIME 12.8 03/22/2018       Cultures:    Lab Results   Component Value Date     BLOODCX No growth at 2 days 06/15/2019     No components found for: URINCX    CT HEAD WO CONTRAST-  6/14/2019 7:55 AM CDT     HISTORY: Seizures, history of brain tumor previous right-sided frontal  craniotomy.     COMPARISONS: 06/03/2019 head CT      TECHNIQUE:     Radiation dose equals  mGy-cm.  Automated exposure control dose  reduction technique was implemented.        CT evaluation of the head without intravenous contrast. 5 mm transaxial  images were obtained.   2-D sagittal and coronal reconstruction images  were generated.     FINDINGS: Examination was sent to statrad for preliminary  interpretation.     Changes from right craniotomy observed.     The right sided high density lesion is again observed superiorly in the  right frontal lobe, near the midline, extending from the corpus callosum  was noted previously and is essentially unchanged. Second probable high  density lesion observed more laterally in the right frontal lobe near  the craniotomy defect. The CT appearance is most likely unchanged.     There is no mass effect or midline shift.     There is no hydrocephalus.     There is no developing hemorrhage.     Mucosal thickening observed in the ethmoid sinuses.           IMPRESSION:  1. Stable CT appearance of the head compared to 06/03/2019, as described  above.                          MRI of brain on 6/17/2019    IMPRESSION:  1. Approximately 3 cm enhancing partially necrotic tumor immediately above  the right corpus callosum body with associated FLAIR signal immediately above it.  2. Faint enhancement and FLAIR signal extending to the right precentral gyrus, consistent with additional tumor extension.  This report was finalized on 06/17/2019 21:09 by Dr Eugene Thomas, .     ASSESSMENT/PLAN:  Anaplastic glioma  - He completed two (2) XRT treatment fractions and took Temodar for 2 days -- 6/12/19 and 6/13/19.    - Temodar on hold at present.  - MRI of brain on 6/17/2019 documented stable tumor  size with increased enhancement.      CBC today 6/18/2019  WBC 9.12, normalized  HGB 11.0  ,000        Seizure activity     CCU monitoring  Vimpat  Depakene  Keppra     No overt seizure activity on EEG on 6/16/2019.    He is moving extremities equally today with right > left strength, improving.   MRI of brain on 6/17/2019 documented stable tumor size with increased enhancement.  Ambulated with PT yesterday.   Okay from Oncology standpoint to transfer to regular room when appropriate with others.      Goals of care- Dr. Doyle discussed with the family on 6/15/2019, continue with Full Code status.      Lauren Narvaez, EPI    06/18/19  7:30 AM     I personally saw and examined this patient, performing a face-to-face diagnostic evaluation with plan of care reviewed and developed with  MICHELE Ferguson and nursing staff.   I have addended and/or modified the above history of present illness, physical examination, and assessment and plan to reflect my findings and impressions.   Essential elements of the care plan were discussed with  MICHELE Ferguson .   Agree with findings and assessment/plan as documented above.  Questions were encouraged, asked and answered to their understanding and satisfaction.    Delio Romero MD  6/18/2019 8:22 AM

## 2019-06-18 NOTE — PLAN OF CARE
Problem: Fall Risk (Adult)  Goal: Identify Related Risk Factors and Signs and Symptoms   06/18/19 3523   Fall Risk (Adult)   Related Risk Factors (Fall Risk) confusion/agitation;culprit medication(s);fatigue/slow reaction;fear of falling;gait/mobility problems;history of falls   Signs and Symptoms (Fall Risk) presence of risk factors

## 2019-06-18 NOTE — PLAN OF CARE
Problem: Seizure Disorder/Epilepsy (Adult)  Goal: Signs and Symptoms of Listed Potential Problems Will be Absent, Minimized or Managed (Seizure Disorder/Epilepsy)   06/18/19 0730   Goal/Outcome Evaluation   Problems Assessed (Seizure Disorder/Epilepsy) antiepileptic drug side effects/toxicity;situational response   Problems Present (Seizure/Epilepsy) antiepileptic drug side effects/toxicity;injury      06/18/19 0730   Goal/Outcome Evaluation   Problems Assessed (Seizure Disorder/Epilepsy) antiepileptic drug side effects/toxicity;situational response   Problems Present (Seizure/Epilepsy) antiepileptic drug side effects/toxicity;injury

## 2019-06-18 NOTE — PLAN OF CARE
Problem: Restraint, Nonbehavioral (Nonviolent)  Goal: Rationale and Justification  Outcome: Outcome(s) achieved Date Met: 06/18/19 06/18/19 0728   Restraint, Nonbehavioral (Nonviolent)   Rationale and Justification prevent harm to self;prevent line/tube removal

## 2019-06-18 NOTE — THERAPY TREATMENT NOTE
Acute Care - Speech Language Pathology   Swallow Treatment Note [facility]     Patient Name: Lukasz Savage  : 1965  MRN: [id number]  Today's Date: 2019  Onset of Illness/Injury or Date of Surgery: 19     Referring Physician: Dr. Webb      Admit Date: 2019  ST tx completed. Pt eating lunch of regular solids and thin liquids upon ST arrival. Pt able to adequately masticate and clear regular solids without difficulty. 1x delayed cough noted with thin liquids via straw. Lungs are documented as clear at this time. Pt okay to remain on current diet of regular solids and thin liquids. Pt talking with ST and family members appropriately and joking with ST and OT. ST to continue to follow PRN.   Harriet Gilbert, CCC-SLP 2019 1:48 PM      Visit Dx:      ICD-10-CM ICD-9-CM   1. Status epilepticus (CMS/HCC) G40.901 345.3   2. 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 Dysphagia, unspecified type R13.10 787.20   4. Impaired mobility Z74.09 799.89   5. Impaired mobility and ADLs Z74.09 799.89     Patient Active Problem List   Diagnosis   • Hyperlipidemia   • HTN (hypertension)   • Gout   • Anxiety   • Arthritis   • Erectile dysfunction   • Angio-edema   • Seizures (CMS/HCC)   • HCAP (healthcare-associated pneumonia)   • MSSA (methicillin susceptible Staphylococcus aureus) infection   • Foot deformity   • Chronic gout of right foot   • Peripheral edema   • S/P shoulder surgery   • Claudication (CMS/HCC)   • Cigarette smoker   • BMI 30.0-30.9,adult   • BMI 33.0-33.9,adult   • Astrocytoma brain tumor (CMS/HCC)   • Encounter for consultation   • Benign neoplasm of brain (CMS/HCC)   • Status epilepticus (CMS/HCC)   • Hyperkalemia   • Acute kidney injury (CMS/HCC) on chronic kidney disease stage 3   • Leukocytosis   • Anemia       Therapy Treatment  Rehabilitation Treatment Summary     Row Name 19 1326 19 0824          Treatment Time/Intention    Discipline  speech  language pathologist  -BN  physical therapy assistant  -SUKHDEEP     Document Type  therapy note (daily note)  -BN  therapy note (daily note)  -SUKHDEEP     Subjective Information  no complaints  -BN  no complaints  -JP2     Mode of Treatment  individual therapy;speech-language pathology  -BN  --     Patient/Family Observations  pt family present  -BN  --     Patient Effort  good  -BN  --     Existing Precautions/Restrictions  --  fall  -SUKHDEEP     Treatment Considerations/Comments  --  can be slightly confused at times  -JP3     Recorded by [BN] Harriet Gilbert CCC-SLP 06/18/19 1342 [SUKHDEEP] Britt Fuller, [facility] 06/18/19 0825  [JP2] Britt Fuller, PTA 06/18/19 0913  [JP3] Britt Fuller, PTA 06/18/19 0945     Row Name 06/18/19 0824             Bed Mobility Assessment/Treatment    Scooting/Bridging Todd (Bed Mobility)  supervision;verbal cues  -SUKHDEEP      Supine-Sit Todd (Bed Mobility)  supervision  -SUKHDEEP      Sit-Supine Todd (Bed Mobility)  supervision  -SUKHDEEP      Assistive Device (Bed Mobility)  head of bed elevated  -SUKHDEEP      Recorded by [SUKHDEEP] Britt Fuller, PTA 06/18/19 0913      Row Name 06/18/19 0824             Sit-Stand Transfer    Sit-Stand Todd (Transfers)  verbal cues;stand by assist  -SUKHDEEP      Recorded by [SUKHDEEP] Britt Fuller, PTA 06/18/19 0913      Row Name 06/18/19 0824             Stand-Sit Transfer    Stand-Sit Todd (Transfers)  supervision;verbal cues  -SUKHDEEP      Recorded by [SUKHDEEP] Britt Fuller, PTA 06/18/19 0913      Row Name 06/18/19 0824             Gait/Stairs Assessment/Training    Todd Level (Gait)  contact guard  -SUKHDEEP      Distance in Feet (Gait)  200  -SUKHDEEP      Deviations/Abnormal Patterns (Gait)  gait speed decreased  -SUKHDEEP      Recorded by [SUKHDEEP] Britt Fuller, PTA 06/18/19 0914      Row Name 06/18/19 0824             Therapeutic Exercise    Lower Extremity Range of Motion (Therapeutic Exercise)  hip flexion/extension, bilateral;hip  abduction/adduction, bilateral;knee flexion/extension, bilateral;ankle dorsiflexion/plantar flexion, bilateral  -SUKHDEEP      Exercise Type (Therapeutic Exercise)  AROM (active range of motion)  -SUKHDEEP      Position (Therapeutic Exercise)  seated;standing  -SUKHDEEP      Sets/Reps (Therapeutic Exercise)  20  -SUKHDEEP      Recorded by [SUKHDEEP] Britt Fuller, [facility] 06/18/19 0917      Row Name 06/18/19 0824             Standing Balance Activity    Comment (Standing, Balance Training)  side steps, backwards,braiding, one leg stance.cga to min assist  -SUKHDEEP      Recorded by [SUKHDEEP] Britt Fuller, [facility] 06/18/19 0917      Row Name 06/18/19 0824             Positioning and Restraints    Pre-Treatment Position  in bed  -SUKHDEEP      Post Treatment Position  bed  -SUKHDEEP      In Bed  notified nsg;fowlers;call light within reach;encouraged to call for assist;exit alarm on;with family/caregiver  -SUKHDEEP      Recorded by [SUKHDEEP] Britt Fuller, [facility] 06/18/19 0917      Row Name 06/18/19 1326             Pain Assessment    Additional Documentation  Pain Scale: FACES Pre/Post-Treatment (Group)  -BN      Recorded by [BN] Harriet Gilbert CCC-SLP 06/18/19 1342      Row Name 06/18/19 0824             Pain Scale: Numbers Pre/Post-Treatment    Pain Scale: Numbers, Pretreatment  0/10 - no pain  -SUKHDEEP      Recorded by [SUKHDEEP] Britt Fuller, [facility] 06/18/19 0917      Row Name 06/18/19 1326             Pain Scale: FACES Pre/Post-Treatment    Pain: FACES Scale, Pretreatment  0-->no hurt  -BN      Pain: FACES Scale, Post-Treatment  0-->no hurt  -BN      Recorded by [BN] Harriet Gilbert, CCC-SLP 06/18/19 1342      Row Name 06/18/19 1326             Outcome Summary/Treatment Plan (SLP)    Daily Summary of Progress (SLP)  progress toward functional goals is good  -BN      Barriers to Overall Progress (SLP)  n/a  -BN      Plan for Continued Treatment (SLP)  continue to follow  -BN      Anticipated Dischage Disposition  unknown  -BN      Recorded by [BN] Harriet Gilbert  Patrick, CCC-SLP 06/18/19 1342        User Key  (r) = Recorded By, (t) = Taken By, (c) = Cosigned By    Initials Name Effective Dates Discipline    SUKHDEEP Britt Fuller, PTA 08/02/16 -  PT    BN Harriet Gilbert, CCC-SLP 04/03/18 -  SLP          Outcome Summary  Outcome Summary/Treatment Plan (SLP)  Daily Summary of Progress (SLP): progress toward functional goals is good (06/18/19 1326 : Harriet Gilbert, CCC-SLP)  Barriers to Overall Progress (SLP): n/a (06/18/19 1326 : Harriet Gilbert, CCC-SLP)  Plan for Continued Treatment (SLP): continue to follow (06/18/19 1326 : Harriet Gilbert, CCC-SLP)  Anticipated Dischage Disposition: unknown (06/18/19 1326 : Harriet Gilbert, [facility]-SLP)      SLP GOALS     Row Name 06/18/19 1326 06/17/19 0843          Oral Nutrition/Hydration Goal 1 (SLP)    Oral Nutrition/Hydration Goal 1, SLP  Pt will tolerate LRD without overt s/s of aspiration.  -BN  Pt will tolerate LRD without overt s/s of aspiration.  -TM     Time Frame (Oral Nutrition/Hydration Goal 1, SLP)  by discharge  -BN  by discharge  -TM     Barriers (Oral Nutrition/Hydration Goal 1, SLP)  Cognition   -BN  Cognition   -TM     Progress/Outcomes (Oral Nutrition/Hydration Goal 1, SLP)  continuing progress toward goal  -BN  goal ongoing  -TM        Labial Strengthening Goal 1 (SLP)    Activity (Labial Strengthening Goal 1, SLP)  increase labial tone  -BN  increase labial tone  -TM     Increase Labial Tone  labial resistance exercises  -BN  labial resistance exercises  -TM     Dickinson/Accuracy (Labial Strengthening Goal 1, SLP)  independently (over 90% accuracy)  -BN  independently (over 90% accuracy)  -TM     Time Frame (Labial Strengthening Goal 1, SLP)  by discharge  -BN  by discharge  -TM     Barriers (Labial Strengthening Goal 1, SLP)  Cognition  -BN  Cognition  -TM     Progress/Outcomes (Labial Strengthening Goal 1, SLP)  goal ongoing  -BN  goal ongoing  -TM        Lingual Strengthening  Goal 1 (SLP)    Activity (Lingual Strengthening Goal 1, SLP)  increase lingual tone/sensation/control/coordination/movement  -BN  increase lingual tone/sensation/control/coordination/movement  -TM     Increase Lingual Tone/Sensation/Control/Coordination/Movement  lingual movement exercises  -BN  lingual movement exercises  -TM     [place]et/Accuracy (Lingual Strengthening Goal 1, SLP)  independently (over 90% accuracy)  -BN  independently (over 90% accuracy)  -TM     Time Frame (Lingual Strengthening Goal 1, SLP)  by discharge  -BN  by discharge  -TM     Barriers (Lingual Strengthening Goal 1, SLP)  Cognition  -BN  Cognition  -TM     Progress/Outcomes (Lingual Strengthening Goal 1, SLP)  goal ongoing  -BN  goal ongoing  -TM       User Key  (r) = Recorded By, (t) = Taken By, (c) = Cosigned By    Initials Name Provider Type    Amira Sales CCC-SLP Speech and Language Pathologist    Harriet Huston CCC-SLP Speech and Language Pathologist          EDUCATION  The patient has been educated in the following areas:   Dysphagia (Swallowing Impairment).    SLP Recommendation and Plan  Daily Summary of Progress (SLP): progress toward functional goals is good  Barriers to Overall Progress (SLP): n/a  Plan for Continued Treatment (SLP): continue to follow  Anticipated Dischage Disposition: unknown                    Time Calculation:   Time Calculation- SLP     Row Name 06/18/19 1347             Time Calculation- SLP    SLP Start Time  1326  -BN      SLP Stop Time  1337  -      SLP Time Calculation (min)  11 min  -      SLP Received On  06/18/19  -        User Key  (r) = Recorded By, (t) = Taken By, (c) = Cosigned By    Initials Name Provider Type    Harriet Huston CCC-SLP Speech and Language Pathologist          Therapy Charges for Today     Code Description Service Date Service Provider Modifiers Qty    [id number]  ST TREATMENT SWALLOW 1 6/18/2019 Harriet Gilbert CCC-SLP GN 1                  Harriet Gilbert, ERICKSON-SLP  6/18/2019

## 2019-06-18 NOTE — PROGRESS NOTES
"Progress Note    Patient:  Lukasz Savage  YOB: 1965  MRN: [id number]   Admit date: 6/14/2019   Admitting Physician: En Caro MD  Primary Care Physician: Linda Hoffman, DNP, APRN    Chief Complaint/Interval History:  Seizures, brain tumor. No seizures overnight. Confusion improving. Following commands. Moving all extremities purposefully and symmetrically.     Intake/Output Summary (Last 24 hours) at 6/18/2019 0719  Last data filed at 6/18/2019 0400  Gross per 24 hour   Intake 2596.53 ml   Output 1001 ml   Net 1595.53 ml     Allergies:   Allergies   Allergen Reactions   • Lipitor [Atorvastatin] Rash   • Lisinopril Angioedema     Swell tongue     Current Scheduled Medications:     chlorhexidine 15 mL Mouth/Throat Q12H   dexamethasone 6 mg Intravenous Q6H   divalproex 750 mg Oral Q6H   enoxaparin 40 mg Subcutaneous Q24H   lacosamide 150 mg Oral Q12H   levETIRAcetam 1,250 mg Oral BID   pantoprazole 40 mg Oral Q AM   piperacillin-tazobactam 4.5 g Intravenous Q8H   potassium chloride 40 mEq Oral Daily   QUEtiapine 50 mg Oral Nightly   sodium chloride 3 mL Intravenous Q12H   venlafaxine  mg Oral Daily     Current PRN Medications:  •  acetaminophen **OR** acetaminophen  •  LORazepam  •  ondansetron  •  sodium chloride    Review of Systems   Constitutional: Negative for fever.   Neurological: Positive for seizures.   All other systems reviewed and are negative.      Vital Signs:  /94 (BP Location: Left arm, Patient Position: Lying)   Pulse 55   Temp 98.4 °F (36.9 °C) (Oral)   Resp 12   Ht 172.7 cm (68\")   Wt 88.5 kg (195 lb 2.1 oz)   SpO2 96%   BMI 29.67 kg/m²     Physical Exam   Constitutional: He is oriented to person, place, and time. Vital signs are normal. He appears well-developed and well-nourished.   Cardiovascular: Normal rate, regular rhythm, normal heart sounds and intact distal pulses.   Pulmonary/Chest: Effort normal and breath sounds normal. No " "respiratory distress.   Abdominal: Soft. Bowel sounds are normal. He exhibits no distension. There is no tenderness.   Musculoskeletal: Normal range of motion.   Neurological: He is oriented to person, place, and time. He has normal strength.   Skin: Skin is warm and dry.   Psychiatric: His speech is normal.     Vital signs reviewed.  Line/IV site: No erythema, warmth, induration, or tenderness.    Objective:  General Appearance:  Comfortable, well-appearing, in no acute distress and not in pain.    Vital signs: (most recent): Blood pressure 129/94, pulse 55, temperature 98.4 °F (36.9 °C), temperature source Oral, resp. rate 12, height 172.7 cm (68\"), weight 88.5 kg (195 lb 2.1 oz), SpO2 96 %.  Vital signs are normal.  No fever.    Output: Producing urine.    HEENT: Normal HEENT exam.    Lungs:  Normal effort and normal respiratory rate.  He is not in respiratory distress.    Heart: Normal rate.  Regular rhythm.    Chest: Symmetric chest wall expansion.   Extremities: Normal range of motion.    Skin:  Warm and dry.    Abdomen: Abdomen is soft and non-distended.  Bowel sounds are normal.   There is no abdominal tenderness.     Pulses: Distal pulses are intact.        Neurologic Exam     Mental Status   Oriented to person, place, and time.   Attention: normal. Concentration: normal.   Speech: speech is normal   Level of consciousness: alert    Cranial Nerves   Cranial nerves II through XII intact.     Motor Exam   Muscle bulk: normal    Strength   Strength 5/5 throughout.     Sensory Exam   Light touch normal.       Lab Results:  ABG: Results from last 7 days   Lab Units 06/15/19  2325 06/15/19  0350 06/14/19  0539   PH, ARTERIAL pH units 7.488* 7.482* 7.467*   PCO2, ARTERIAL mm Hg 24.5* 26.3* 27.0*   PO2 ART mm Hg 121.0* 112.0* 135.0*   VENTILATOR MODE  AC AC AC     CBC: Results from last 7 days   Lab Units 06/18/19  0330 06/17/19  0515 06/16/19  0148 06/15/19  0228 06/14/19  0349   WBC 10*3/mm3 9.12 15.58* 15.75* " 13.92* 11.21*   HEMOGLOBIN g/dL 11.0* 11.4* 11.8* 11.9* 12.9*   HEMATOCRIT % 32.6* 33.9* 35.2* 35.2* 39.0*   PLATELETS 10*3/mm3 178 195 196 185 274     BMP:  Results from last 7 days   Lab Units 06/18/19  0330 06/17/19  0515 06/16/19  0148 06/15/19  0228 06/14/19  0914 06/14/19  0539 06/14/19  0349   SODIUM mmol/L 141 140 141 143 141  --  143   SODIUM, ARTERIAL mmol/L  --   --   --   --   --  141  --    POTASSIUM mmol/L 3.8 3.6 3.3* 3.9 3.9  --  5.4*   CHLORIDE mmol/L 112* 111* 114* 115* 110  --  110   CO2 mmol/L 22.0* 24.0 18.0* 20.0* 24.0  --  28.0   BUN mg/dL 20 15 13 17 26*  --  27*   CREATININE mg/dL 1.09 1.08 1.07 1.21 1.54*  --  1.70*   GLUCOSE mg/dL 102* 86 101* 68* 81  --  113*   CALCIUM mg/dL 8.3* 8.4 8.1* 8.2* 8.9  --  8.9   ALT (SGPT) U/L  --  65*  --  48  --   --  35     Culture Results:   Blood Culture   Date Value Ref Range Status   06/15/2019 No growth at 2 days  Preliminary     Respiratory Culture   Date Value Ref Range Status   06/16/2019 Light growth (2+) Normal Respiratory [place]  Preliminary         Seizures (CMS/HCC)    Astrocytoma brain tumor (CMS/HCC)    Status epilepticus (CMS/HCC)    Hyperkalemia    Acute kidney injury (CMS/HCC) on chronic kidney disease stage 3    Leukocytosis    Anemia      Radiology:  MRI shows stable size of tumor however it does appear to be enhancing more than previous MRI    Additional Studies Reviewed:     Impression/Recommendations:   Heart rate and blood pressure stable  Oxygen level stable  Adequate oral intake  Adequate urine output  Neuro exam stable, mild disorientation  Okay from neurosurgical standpoint to be transferred to the floor when okay with admitting        Tommie Thornton, APRN

## 2019-06-18 NOTE — PROGRESS NOTES
[facility] Medicine Services  INPATIENT PROGRESS NOTE    Patient Name: Lukasz Savage  Date of Admission: 6/14/2019  Today's Date: 06/18/19  Length of Stay: 4  Primary Care Physician: Linda Hoffman, DNP, APRN    Subjective   Chief Complaint: follow-up seizures and Astrocytoma  HPI   Patient denies any pain this morning.  He reports no new neuro deficits.  He states that he feels like he has symmetrical strength involving both his upper extremities and lower extremities.  He reports that he was able to walk with physical therapy yesterday.  States that his appetite has been good.  He is eager to get out of the intensive care unit and moved to a regular floor.  He voices no new complaints at this time.    Review of Systems     All pertinent negatives and positives are as above. All other systems have been reviewed and are negative unless otherwise stated.     Objective    Temp:  [97.6 °F (36.4 °C)-98.4 °F (36.9 °C)] 98.4 °F (36.9 °C)  Heart Rate:  [55-79] 55  Resp:  [12-22] 12  BP: ()/() 129/94  Physical Exam   Constitutional: No distress.   HENT:   Head: Normocephalic.   Mouth/Throat: No oropharyngeal exudate.   Eyes: No scleral icterus.   Neck: No tracheal deviation present.   Cardiovascular: Normal rate and regular rhythm.   Pulmonary/Chest: Effort normal. No respiratory distress.   Abdominal: Soft.   Musculoskeletal: He exhibits no edema.   Neurological: He is alert. No cranial nerve deficit.   Appears to have symmetrical motor strength to bilateral UEs and LEs; no new focal deficits appreciated at this time   Skin: Skin is warm and dry. He is not diaphoretic.   Psychiatric: He has a normal mood and affect.   Vitals reviewed.      Results Review:  I have reviewed the labs, radiology results, and diagnostic studies.    Laboratory Data:   Results from last 7 days   Lab Units 06/18/19  0330 06/17/19  0515 06/16/19  0148   WBC 10*3/mm3 9.12 15.58* 15.75*    HEMOGLOBIN g/dL 11.0* 11.4* 11.8*   HEMATOCRIT % 32.6* 33.9* 35.2*   PLATELETS 10*3/mm3 178 195 196        Results from last 7 days   Lab Units 06/18/19  0330 06/17/19  0515 06/16/19  0148 06/15/19  0228  06/14/19  0349   SODIUM mmol/L 141 140 141 143   < > 143   SODIUM, ARTERIAL   --   --   --   --    < >  --    POTASSIUM mmol/L 3.8 3.6 3.3* 3.9   < > 5.4*   CHLORIDE mmol/L 112* 111* 114* 115*   < > 110   CO2 mmol/L 22.0* 24.0 18.0* 20.0*   < > 28.0   BUN mg/dL 20 15 13 17   < > 27*   CREATININE mg/dL 1.09 1.08 1.07 1.21   < > 1.70*   CALCIUM mg/dL 8.3* 8.4 8.1* 8.2*   < > 8.9   BILIRUBIN mg/dL  --  0.5  --  0.7  --  0.3   ALK PHOS U/L  --  57  --  50  --  64   ALT (SGPT) U/L  --  65*  --  48  --  35   AST (SGOT) U/L  --  46*  --  50*  --  39   GLUCOSE mg/dL 102* 86 101* 68*   < > 113*    < > = values in this interval not displayed.       Culture Data:   Blood Culture   Date Value Ref Range Status   06/15/2019 No growth at 2 days  Preliminary     Respiratory Culture   Date Value Ref Range Status   06/16/2019 Light growth (2+) Normal Respiratory [place]  Preliminary       Radiology Data:   Imaging Results (last 24 hours)     Procedure Component Value Units Date/Time    MRI Brain With & Without Contrast [[id number]] Collected:  06/17/19 1842     Updated:  06/17/19 2113    Narrative:          History:  54-year-old with seizures. Anaplastic astrocytoma.     Reference   Brain MRI June 2019.     Technique  Routine pre and postcontrast enhanced brain MRI.     Findings  There is no diffusion abnormality.     3.7 cm of FLAIR hyperintensity involving the inferior/posterior right  frontal lobe with involvement of the cortex is unchanged. This is  immediately cephalad to the right lateral ventricular body. There is 3  cm necrotic enhancing soft tissue immediately below this area of FLAIR  signal causing mass effect but not clearly invading the right corpus  callosum body (see sagittal postcontrast images). This is all  consistent  with patient's known astrocytoma. There is additional FLAIR signal on  both sides of the right central sulcus with faint contrast enhancement  in the precentral gyrus. This is compatible with tumor extension. No  tumor is visible in the left cerebral hemisphere or posterior fossa.  There is no brain herniation. No hemorrhage is seen. Dural venous  sinuses are patent. Ventricular system maintains normal caliber.          Impression:       Approximately 3 cm enhancing partially necrotic tumor immediately above  the right corpus callosum body. Associated FLAIR signal is noted  immediately above it. Faint enhancement and FLAIR signal extending to  the right precentral gyrus, consistent with additional tumor extension.  This report was finalized on 06/17/2019 21:09 by Dr Eugene Thomas, .          I have reviewed the patient's current medications.     Assessment/Plan     Active Hospital Problems    Diagnosis   • Anemia   • Leukocytosis   • Status epilepticus (CMS/HCC)   • Hyperkalemia   • Acute kidney injury (CMS/HCC) on chronic kidney disease stage 3   • Astrocytoma brain tumor (CMS/HCC)   • Seizures (CMS/HCC)     Plan:  1.  MRI Brain and EEG results reviewed  2.  IV Decadron  3.  PO Depakote, Vimpat, and Keppra  4.  IVFs to KVO  5.  Advanced to regular diet  6.  Seizure precautions  7.  PT and OT  8.  Appreciate consultants - NSGY, Neurology, and Oncology following  9.  D/C Zosyn and monitor off of antimicrobial therapy  10.  Transfer to neuro floor today      En Caro MD   06/18/19   7:30 AM

## 2019-06-18 NOTE — PLAN OF CARE
Problem: Restraint, Nonbehavioral (Nonviolent)  Goal: Nonbehavioral (Nonviolent) Restraint: Achievement of Discontinuation Criteria  Outcome: Outcome(s) achieved Date Met: 06/18/19 06/18/19 0729   Restraint, Nonbehavioral (Nonviolent)   Nonbehavioral (Nonviolent) Restraint: Achievement of Discontinuation Criteria met

## 2019-06-18 NOTE — PLAN OF CARE
Problem: Restraint, Nonbehavioral (Nonviolent)  Goal: Nonbehavioral (Nonviolent) Restraint: Preservation of Dignity and Wellbeing  Outcome: Outcome(s) achieved Date Met: 06/18/19 06/18/19 0729   Restraint, Nonbehavioral (Nonviolent)   Nonbehavioral (Nonviolent) Restraint: Preservation of Dignity and Wellbeing met

## 2019-06-18 NOTE — PLAN OF CARE
Problem: Patient Care Overview  Goal: Plan of Care Review  Outcome: Ongoing (interventions implemented as appropriate)   06/18/19 5285   OTHER   Outcome Summary Pt. has good bed mobility. Stands and sits with SBA. Ambulates 200' with CGA. Standing balance activities with minimal assist. Will benefit from strengthening and balance activities.   Coping/Psychosocial   Plan of Care Reviewed With patient

## 2019-06-18 NOTE — PLAN OF CARE
Problem: Patient Care Overview  Goal: Plan of Care Review   06/18/19 7661   OTHER   Outcome Summary Patient without any complaints of seizure activity. Patient oriented to person, place, situation but is confused to time. Patient VSS remain stable and patient may be sent to the floor today.    Coping/Psychosocial   Plan of Care Reviewed With patient;daughter;mother

## 2019-06-18 NOTE — PLAN OF CARE
Problem: Pain, Chronic (Adult)  Goal: Identify Related Risk Factors and Signs and Symptoms   06/18/19 0731   Pain, Chronic (Adult)   Related Risk Factors (Chronic Pain) disease process;coping ineffective;physical disability;procedures/treatments;tumor progression   Signs and Symptoms (Chronic Pain) fear of reinjury;guarding behavior/splinting/limp;impaired thought process/concentration;pacing/restlessness;verbalization of pain/discomfort for a prolonged time period

## 2019-06-18 NOTE — PLAN OF CARE
Problem: Patient Care Overview  Goal: Plan of Care Review  Outcome: Ongoing (interventions implemented as appropriate)   06/18/19 1344   OTHER   Outcome Summary ST tx completed. Pt eating lunch of regular solids and thin liquids upon ST arrival. Pt able to adequately masticate and clear regular solids without difficulty. 1x delayed cough noted with thin liquids via straw. Lungs are documented as clear at this time. Pt okay to remain on current diet of regular solids and thin liquids. Pt talking with ST and family members appropriately and joking with ST and OT. ST to continue to follow PRN.    Coping/Psychosocial   Plan of Care Reviewed With patient;family   Plan of Care Review   Progress improving

## 2019-06-18 NOTE — PLAN OF CARE
Problem: Fall Risk (Adult)  Goal: Absence of Fall  Outcome: Ongoing (interventions implemented as appropriate)   06/18/19 0748   Fall Risk (Adult)   Absence of Fall making progress toward outcome

## 2019-06-18 NOTE — PLAN OF CARE
Problem: Restraint, Nonbehavioral (Nonviolent)  Goal: Nonbehavioral (Nonviolent) Restraint: Absence of Injury/Harm  Outcome: Outcome(s) achieved Date Met: 06/18/19 06/18/19 0728   Restraint, Nonbehavioral (Nonviolent)   Nonbehavioral (Nonviolent) Restraint: Absence of Injury/Harm met

## 2019-06-18 NOTE — PLAN OF CARE
Problem: Skin Injury Risk (Adult)  Goal: Identify Related Risk Factors and Signs and Symptoms  Outcome: Ongoing (interventions implemented as appropriate)   06/18/19 9973   Skin Injury Risk (Adult)   Related Risk Factors (Skin Injury Risk) cognitive impairment;critical care admission

## 2019-06-18 NOTE — PLAN OF CARE
Problem: Pain, Chronic (Adult)  Goal: Acceptable Pain/Comfort Level and Functional Ability  Outcome: Ongoing (interventions implemented as appropriate)   06/18/19 6720   Pain, Chronic (Adult)   Acceptable Pain/Comfort Level and Functional Ability making progress toward outcome

## 2019-06-19 LAB
BACTERIA SPEC RESP CULT: NORMAL
GRAM STN SPEC: NORMAL
LEVETIRACETAM SERPL-MCNC: 45.2 UG/ML (ref 10–40)
VALPROATE SERPL-MCNC: 51.4 MCG/ML (ref 50–100)

## 2019-06-19 PROCEDURE — 97110 THERAPEUTIC EXERCISES: CPT

## 2019-06-19 PROCEDURE — 25010000002 TEMOZOLOMIDE PER 5 MG: Performed by: INTERNAL MEDICINE

## 2019-06-19 PROCEDURE — 25010000002 ENOXAPARIN PER 10 MG: Performed by: INTERNAL MEDICINE

## 2019-06-19 PROCEDURE — 97116 GAIT TRAINING THERAPY: CPT

## 2019-06-19 PROCEDURE — 97530 THERAPEUTIC ACTIVITIES: CPT | Performed by: OCCUPATIONAL THERAPIST

## 2019-06-19 PROCEDURE — 94799 UNLISTED PULMONARY SVC/PX: CPT

## 2019-06-19 PROCEDURE — 99024 POSTOP FOLLOW-UP VISIT: CPT | Performed by: NURSE PRACTITIONER

## 2019-06-19 PROCEDURE — 92526 ORAL FUNCTION THERAPY: CPT

## 2019-06-19 PROCEDURE — 77412 RADIATION TX DELIVERY LVL 3: CPT | Performed by: RADIOLOGY

## 2019-06-19 PROCEDURE — 94760 N-INVAS EAR/PLS OXIMETRY 1: CPT

## 2019-06-19 PROCEDURE — 25010000002 DEXAMETHASONE PER 1 MG: Performed by: NEUROLOGICAL SURGERY

## 2019-06-19 PROCEDURE — 97535 SELF CARE MNGMENT TRAINING: CPT | Performed by: OCCUPATIONAL THERAPIST

## 2019-06-19 PROCEDURE — 99232 SBSQ HOSP IP/OBS MODERATE 35: CPT | Performed by: PSYCHIATRY & NEUROLOGY

## 2019-06-19 PROCEDURE — 80164 ASSAY DIPROPYLACETIC ACD TOT: CPT | Performed by: PSYCHIATRY & NEUROLOGY

## 2019-06-19 RX ORDER — PROMETHAZINE HYDROCHLORIDE 25 MG/1
12.5 TABLET ORAL EVERY 6 HOURS PRN
Status: DISCONTINUED | OUTPATIENT
Start: 2019-06-19 | End: 2019-06-20 | Stop reason: HOSPADM

## 2019-06-19 RX ORDER — TEMOZOLOMIDE 140 MG/1
140 CAPSULE ORAL NIGHTLY
Status: DISCONTINUED | OUTPATIENT
Start: 2019-06-19 | End: 2019-06-20 | Stop reason: HOSPADM

## 2019-06-19 RX ORDER — TEMOZOLOMIDE 5 MG/1
10 CAPSULE ORAL NIGHTLY
Status: DISCONTINUED | OUTPATIENT
Start: 2019-06-19 | End: 2019-06-20 | Stop reason: HOSPADM

## 2019-06-19 RX ADMIN — OXYCODONE HYDROCHLORIDE AND ACETAMINOPHEN 2 TABLET: 5; 325 TABLET ORAL at 21:26

## 2019-06-19 RX ADMIN — VENLAFAXINE HYDROCHLORIDE 150 MG: 75 CAPSULE, EXTENDED RELEASE ORAL at 08:44

## 2019-06-19 RX ADMIN — DEXAMETHASONE SODIUM PHOSPHATE 6 MG: 10 INJECTION INTRAMUSCULAR; INTRAVENOUS at 00:09

## 2019-06-19 RX ADMIN — DEXAMETHASONE SODIUM PHOSPHATE 6 MG: 10 INJECTION INTRAMUSCULAR; INTRAVENOUS at 17:42

## 2019-06-19 RX ADMIN — SODIUM CHLORIDE, PRESERVATIVE FREE 3 ML: 5 INJECTION INTRAVENOUS at 21:14

## 2019-06-19 RX ADMIN — LACOSAMIDE 150 MG: 50 TABLET, FILM COATED ORAL at 08:44

## 2019-06-19 RX ADMIN — DEXAMETHASONE SODIUM PHOSPHATE 6 MG: 10 INJECTION INTRAMUSCULAR; INTRAVENOUS at 06:20

## 2019-06-19 RX ADMIN — DIVALPROEX SODIUM 1250 MG: 500 TABLET, DELAYED RELEASE ORAL at 06:21

## 2019-06-19 RX ADMIN — TEMOZOLOMIDE 10 MG: 5 CAPSULE ORAL at 21:17

## 2019-06-19 RX ADMIN — POTASSIUM CHLORIDE 40 MEQ: 20 SOLUTION ORAL at 08:44

## 2019-06-19 RX ADMIN — PANTOPRAZOLE SODIUM 40 MG: 40 TABLET, DELAYED RELEASE ORAL at 06:21

## 2019-06-19 RX ADMIN — LEVETIRACETAM 1250 MG: 500 TABLET, FILM COATED ORAL at 21:15

## 2019-06-19 RX ADMIN — DEXAMETHASONE SODIUM PHOSPHATE 6 MG: 10 INJECTION INTRAMUSCULAR; INTRAVENOUS at 11:19

## 2019-06-19 RX ADMIN — SODIUM CHLORIDE, PRESERVATIVE FREE 3 ML: 5 INJECTION INTRAVENOUS at 06:21

## 2019-06-19 RX ADMIN — QUETIAPINE FUMARATE 50 MG: 25 TABLET, FILM COATED ORAL at 21:14

## 2019-06-19 RX ADMIN — SODIUM CHLORIDE, PRESERVATIVE FREE 3 ML: 5 INJECTION INTRAVENOUS at 08:58

## 2019-06-19 RX ADMIN — DIVALPROEX SODIUM 1250 MG: 500 TABLET, DELAYED RELEASE ORAL at 00:09

## 2019-06-19 RX ADMIN — LACOSAMIDE 150 MG: 50 TABLET, FILM COATED ORAL at 21:14

## 2019-06-19 RX ADMIN — ENOXAPARIN SODIUM 40 MG: 40 INJECTION SUBCUTANEOUS at 08:44

## 2019-06-19 RX ADMIN — DIVALPROEX SODIUM 1250 MG: 500 TABLET, DELAYED RELEASE ORAL at 21:15

## 2019-06-19 RX ADMIN — TEMOZOLOMIDE 140 MG: 140 CAPSULE ORAL at 21:16

## 2019-06-19 RX ADMIN — DIVALPROEX SODIUM 1250 MG: 500 TABLET, DELAYED RELEASE ORAL at 15:25

## 2019-06-19 RX ADMIN — LEVETIRACETAM 1250 MG: 500 TABLET, FILM COATED ORAL at 08:44

## 2019-06-19 RX ADMIN — SODIUM CHLORIDE, PRESERVATIVE FREE 3 ML: 5 INJECTION INTRAVENOUS at 00:10

## 2019-06-19 NOTE — PLAN OF CARE
Problem: Patient Care Overview  Goal: Plan of Care Review  Outcome: Ongoing (interventions implemented as appropriate)   06/19/19 0920   OTHER   Outcome Summary Clinical SLP dysphagia tx completed 6/19/2019. Pt sitting on edge of bed with breakfast tray upon ST arrival. PO trials of regular consistency and thin liquids observed with adequate mastication and oral clearing of solids. No overt s/s of PO trials given. Pt lungs currently documented as clear. ST cannot fully rule out aspiration w/ PO. Pt feels speech and communication are back to baseline status. ST services no longer warranted. MD to reconsult if there is a change or further concerns.    Coping/Psychosocial   Plan of Care Reviewed With patient;family   Plan of Care Review   Progress improving

## 2019-06-19 NOTE — THERAPY DISCHARGE NOTE
Acute Care - Speech Language Pathology   Swallow Treatment Note/Discharge   [facility]     Patient Name: Lukasz Savage  : 1965  MRN: [id number]  Today's Date: 2019  Onset of Illness/Injury or Date of Surgery: 19     Referring Physician: Dr. Webb      Admit Date: 2019    Clinical SLP dysphagia tx completed 2019. Pt sitting on edge of bed with breakfast tray upon ST arrival. PO trials of regular consistency and thin liquids observed with adequate mastication and oral clearing of solids. No overt s/s of PO trials given. Pt lungs currently documented as clear. ST cannot fully rule out aspiration w/ PO. Pt feels speech and communication are back to baseline status. ST services no longer warranted. MD to reconsult if there is a change or further concerns.   Emi Boyer  Clinician    Visit Dx:      ICD-10-CM ICD-9-CM   1. Status epilepticus (CMS/HCC) G40.901 345.3   2. 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 Dysphagia, unspecified type R13.10 787.20   4. Impaired mobility Z74.09 799.89   5. Impaired mobility and ADLs Z74.09 799.89     Patient Active Problem List   Diagnosis   • Hyperlipidemia   • HTN (hypertension)   • Gout   • Anxiety   • Arthritis   • Erectile dysfunction   • Angio-edema   • Seizures (CMS/HCC)   • HCAP (healthcare-associated pneumonia)   • MSSA (methicillin susceptible Staphylococcus aureus) infection   • Foot deformity   • Chronic gout of right foot   • Peripheral edema   • S/P shoulder surgery   • Claudication (CMS/HCC)   • Cigarette smoker   • BMI 30.0-30.9,adult   • BMI 33.0-33.9,adult   • Astrocytoma brain tumor (CMS/HCC)   • Encounter for consultation   • Benign neoplasm of brain (CMS/HCC)   • Status epilepticus (CMS/HCC)   • Hyperkalemia   • Acute kidney injury (CMS/HCC) on chronic kidney disease stage 3   • Leukocytosis   • Anemia       Therapy Treatment  Rehabilitation Treatment Summary     Row Name 19 0852              Treatment Time/Intention    Discipline  speech language pathologist  (Pended)   -LT      Document Type  discharge treatment  (Pended)   -LT      Subjective Information  no complaints  (Pended)   -LT      Mode of Treatment  individual therapy;speech-language pathology  (Pended)   -LT      Patient/Family Observations  mother present  (Pended)   -LT      Care Plan Review  care plan/treatment goals reviewed;risks/benefits reviewed;current/potential barriers reviewed;patient/other agree to care plan  (Pended)   -LT      Patient Effort  good  (Pended)   -LT      Recorded by [LT] Emi Boyer Speech Therapy Student 06/19/19 0917      Row Name 06/19/19 0852             Pain Assessment    Additional Documentation  Pain Scale: FACES Pre/Post-Treatment (Group)  (Pended)   -LT      Recorded by [LT] Emi Boyer Speech Therapy Student 06/19/19 0917      Row Name 06/19/19 0852             Pain Scale: FACES Pre/Post-Treatment    Pain: FACES Scale, Pretreatment  0-->no hurt  (Pended)   -LT      Pain: FACES Scale, Post-Treatment  0-->no hurt  (Pended)   -LT      Recorded by [LT] Emi Boyer Speech Therapy Student 06/19/19 0917      Row Name 06/19/19 0852             Outcome Summary/Treatment Plan (SLP)    Daily Summary of Progress (SLP)  prepare for discharge  (Pended)   -LT      Barriers to Overall Progress (SLP)  n/a  (Pended)   -LT      Plan for Continued Treatment (SLP)  discharge from speech services  (Pended)   -LT      Anticipated Dischage Disposition  unknown  (Pended)   -LT      Recorded by [LT] Emi Boyer Speech Therapy Student 06/19/19 0917        User Key  (r) = Recorded By, (t) = Taken By, (c) = Cosigned By    Initials Name Effective Dates Discipline    LT Emi Boyer Speech Therapy Student 04/24/19 -  SLP        Outcome Summary  Outcome Summary/Treatment Plan (SLP)  Daily Summary of Progress (SLP): (P) prepare for discharge (06/19/19 0852 : Emi Boyer Speech Therapy Student)  Barriers to Overall  Progress (SLP): (P) n/a (06/19/19 0852 : Emi Boyer, Speech Therapy Student)  Plan for Continued Treatment (SLP): (P) discharge from speech services (06/19/19 0852 : Emi Boyer, Speech Therapy Student)  Anticipated Dischage Disposition: (P) unknown (06/19/19 0923 : Emi Boyer, Speech Therapy Student)  Reason for Discharge: (P) all goals and outcomes met, no further needs identified (06/19/19 0923 : Emi Boyer, Speech Therapy Student)  SLP GOALS     Row Name 06/19/19 0900 06/18/19 1326 06/17/19 0843       Oral Nutrition/Hydration Goal 1 (SLP)    Oral Nutrition/Hydration Goal 1, SLP  Pt will tolerate LRD without overt s/s of aspiration.  (Pended)   -LT  Pt will tolerate LRD without overt s/s of aspiration.  -BN  Pt will tolerate LRD without overt s/s of aspiration.  -TM    Time Frame (Oral Nutrition/Hydration Goal 1, SLP)  by discharge  (Pended)   -LT  by discharge  -BN  by discharge  -TM    Barriers (Oral Nutrition/Hydration Goal 1, SLP)  --  Cognition   -BN  Cognition   -TM    Progress/Outcomes (Oral Nutrition/Hydration Goal 1, SLP)  goal met  (Pended)   -LT  continuing progress toward goal  -BN  goal ongoing  -TM       Labial Strengthening Goal 1 (SLP)    Activity (Labial Strengthening Goal 1, SLP)  increase labial tone  (Pended)   -LT  increase labial tone  -BN  increase labial tone  -TM    Increase Labial Tone  labial resistance exercises  (Pended)   -LT  labial resistance exercises  -BN  labial resistance exercises  -TM    [place]/Accuracy (Labial Strengthening Goal 1, SLP)  independently (over 90% accuracy)  (Pended)   -LT  independently (over 90% accuracy)  -BN  independently (over 90% accuracy)  -TM    Time Frame (Labial Strengthening Goal 1, SLP)  by discharge  (Pended)   -LT  by discharge  -BN  by discharge  -TM    Barriers (Labial Strengthening Goal 1, SLP)  --  Cognition  -BN  Cognition  -TM    Progress/Outcomes (Labial Strengthening Goal 1, SLP)  goal no longer appropriate  (Pended)    -LT  goal ongoing  -BN  goal ongoing  -TM       Lingual Strengthening Goal 1 (SLP)    Activity (Lingual Strengthening Goal 1, SLP)  increase lingual tone/sensation/control/coordination/movement  (Pended)   -LT  increase lingual tone/sensation/control/coordination/movement  -BN  increase lingual tone/sensation/control/coordination/movement  -TM    Increase Lingual Tone/Sensation/Control/Coordination/Movement  lingual movement exercises  (Pended)   -LT  lingual movement exercises  -BN  lingual movement exercises  -TM    [place]/Accuracy (Lingual Strengthening Goal 1, SLP)  independently (over 90% accuracy)  (Pended)   -LT  independently (over 90% accuracy)  -BN  independently (over 90% accuracy)  -TM    Time Frame (Lingual Strengthening Goal 1, SLP)  by discharge  (Pended)   -LT  by discharge  -BN  by discharge  -TM    Barriers (Lingual Strengthening Goal 1, SLP)  --  Cognition  -BN  Cognition  -TM    Progress/Outcomes (Lingual Strengthening Goal 1, SLP)  goal no longer appropriate  (Pended)   -LT  goal ongoing  -BN  goal ongoing  -TM      User Key  (r) = Recorded By, (t) = Taken By, (c) = Cosigned By    Initials Name Provider Type    TM Amira Ricks, CCC-SLP Speech and Language Pathologist    BN Harriet Gilbert CCC-SLP Speech and Language Pathologist    LT Emi Boyer, Speech Therapy Student Speech Therapy Student          EDUCATION  The patient has been educated in the following areas:   Dysphagia (Swallowing Impairment).    SLP Recommendation and Plan  Daily Summary of Progress (SLP): (P) prepare for discharge  Barriers to Overall Progress (SLP): (P) n/a  Plan for Continued Treatment (SLP): (P) discharge from speech services  Anticipated Dischage Disposition: (P) unknown        Reason for Discharge: (P) all goals and outcomes met, no further needs identified           Time Calculation:   Time Calculation- SLP     Row Name 06/19/19 5082             Time Calculation- SLP    SLP Start Time  1283   (Pended)   -LT      SLP Stop Time  0910  (Pended)   -LT      SLP Time Calculation (min)  18 min  (Pended)   -LT      SLP Received On  06/19/19  (Pended)   -LT        User Key  (r) = Recorded By, (t) = Taken By, (c) = Cosigned By    Initials Name Provider Type    LT Emi Boyer Speech Therapy Student Speech Therapy Student          Therapy Charges for Today     Code Description Service Date Service Provider Modifiers Qty    [id number]  ST TREATMENT SWALLOW 1 6/19/2019 Emi Boyer Speech Therapy Student GN 1               SLP Discharge Summary  Anticipated Dischage Disposition: (P) unknown  Reason for Discharge: (P) all goals and outcomes met, no further needs identified  Progress Toward Achieving Short/long Term Goals: (P) all goals met within established timelines  Discharge Destination: (P) other (see comments)(still in acute care)    Mary Galarza Therapy Student  6/19/2019

## 2019-06-19 NOTE — THERAPY TREATMENT NOTE
Acute Care - Physical Therapy Treatment Note  [facility]     Patient Name: Lukasz Savage  : 1965  MRN: [id number]  Today's Date: 2019  Onset of Illness/Injury or Date of Surgery: 19  Date of Referral to PT: 19  Referring Physician: Dr. Webb    Admit Date: 2019    Visit Dx:    ICD-10-CM ICD-9-CM   1. Status epilepticus (CMS/HCC) G40.901 345.3   2. 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 Dysphagia, unspecified type R13.10 787.20   4. Impaired mobility Z74.09 799.89   5. Impaired mobility and ADLs Z74.09 799.89     Patient Active Problem List   Diagnosis   • Hyperlipidemia   • HTN (hypertension)   • Gout   • Anxiety   • Arthritis   • Erectile dysfunction   • Angio-edema   • Seizures (CMS/HCC)   • HCAP (healthcare-associated pneumonia)   • MSSA (methicillin susceptible Staphylococcus aureus) infection   • Foot deformity   • Chronic gout of right foot   • Peripheral edema   • S/P shoulder surgery   • Claudication (CMS/HCC)   • Cigarette smoker   • BMI 30.0-30.9,adult   • BMI 33.0-33.9,adult   • Astrocytoma brain tumor (CMS/HCC)   • Encounter for consultation   • Benign neoplasm of brain (CMS/HCC)   • Status epilepticus (CMS/HCC)   • Hyperkalemia   • Acute kidney injury (CMS/HCC) on chronic kidney disease stage 3   • Leukocytosis   • Anemia       Therapy Treatment    Rehabilitation Treatment Summary     Row Name 19 1525 19 1342 19 1148       Treatment Time/Intention    Discipline  physical therapist  -JE  physical therapy assistant  -NW  occupational therapist  -JJ    Document Type  —  —  therapy note (daily note)  -JJ    Subjective Information  —  —  no complaints  -JJ    Mode of Treatment  —  —  individual therapy;occupational therapy  -JJ    Patient/Family Observations  no family present  -FIORDALIZA  —  mother present in room   -JJ    Care Plan Review  evaluation/treatment results reviewed;care plan/treatment goals reviewed;risks/benefits  reviewed;current/potential barriers reviewed;patient/other agree to care plan  -JE  —  evaluation/treatment results reviewed;care plan/treatment goals reviewed;risks/benefits reviewed;current/potential barriers reviewed;patient/other agree to care plan  -JJ    Total Minutes, Physical Therapy Treatment  45  -JE2  —  —    Therapy Frequency (PT Clinical Impression)  daily  -JE3  —  —    Therapy Frequency (OT Eval)  —  —  daily  -JJ    Patient Effort  good  -JE3  —  good  -JJ    Comment  —  radiation  -NW  —    Existing Precautions/Restrictions  fall  -JE2  —  fall  -JJ    Patient Response to Treatment  no increase c/os  -JE2  —  —    Recorded by [JE] Janay Allison, PT 06/19/19 1533  [JE2] Janay Allison, PT 06/19/19 1615  [JE3] Janay Allison, PT 06/19/19 1536 [NW] Dulce Maria Mendez, PTA 06/19/19 1342 [JJ] Sri Gamble OTR/L 06/19/19 1212    Row Name 06/19/19 1038 06/19/19 0852          Treatment Time/Intention    Discipline  physical therapy assistant  -NW  speech language pathologist  -BN,LT,BN2     Document Type  —  discharge treatment  -BN,LT,BN2     Subjective Information  —  no complaints  -BN,LT,BN2     Mode of Treatment  —  individual therapy;speech-language pathology  -BN,LT,BN2     Patient/Family Observations  —  mother present  -BN,LT,BN2     Care Plan Review  —  care plan/treatment goals reviewed;risks/benefits reviewed;current/potential barriers reviewed;patient/other agree to care plan  -BN,LT,BN2     Patient Effort  —  good  -BN,LT,BN2     Comment  eating breakfast x1 working w/ nsg getting shower ck on x2 will ck bk  -NW  —     Recorded by [NW] Dulce Maria Mendez, PTA 06/19/19 1040 [BN,LT,BN2] Harriet Gilbert, CCC-SLP (r) Emi Boyer, Speech Therapy Student (t) Harriet Gilbert, CCC-SLP (c) 06/19/19 0933     Row Name 06/19/19 1525             Cognitive Assessment/Intervention- PT/OT    Orientation Status (Cognition)  oriented x 4  -JE      Follows Commands (Cognition)  WNL   -JE2      Safety Deficit (Cognitive)  safety precautions awareness  -JE2      Recorded by [JE] Janay Allison, PT 06/19/19 1536  [JE2] Janay Allison, PT 06/19/19 1615      Row Name 06/19/19 1525 06/19/19 1148          Bed Mobility Assessment/Treatment    Bed Mobility Assessment/Treatment  supine-sit;sit-supine;scooting/bridging  -JE  supine-sit  -JJ     Scooting/Bridging [place] (Bed Mobility)  supervision;independent  -JE  —     Supine-Sit [place] (Bed Mobility)  supervision;independent  -JE  supervision  -JJ     Sit-Supine [place] (Bed Mobility)  supervision;independent  -JE  —     Assistive Device (Bed Mobility)  head of bed elevated  -JE  —     Comment (Bed Mobility)  —  pt sitting EOB at end of session with family present in room   -JJ     Recorded by [JE] Janay Allison, PT 06/19/19 1615 [JJ] Sri Gamble OTR/L 06/19/19 1403     Row Name 06/19/19 1525             Transfer Assessment/Treatment    Transfer Assessment/Treatment  sit-stand transfer;stand-sit transfer  -JE      Recorded by [JE] Janay Allison, PT 06/19/19 1615      Row Name 06/19/19 1525             Sit-Stand Transfer    Sit-Stand [place] (Transfers)  stand by assist  -JE      Recorded by [JE] Janay Allison, PT 06/19/19 1615      Row Name 06/19/19 1525             Stand-Sit Transfer    Stand-Sit [place] (Transfers)  stand by assist  -JE      Recorded by [JE] Janay Allison, PT 06/19/19 1615      Row Name 06/19/19 1525             Gait/Stairs Assessment/Training    92514 - Gait Training Minutes   27  -JE      Gait/Stairs Assessment/Training  gait/ambulation independence;distance ambulated;gait pattern;gait deviations  -      [place] Level (Gait)  contact guard;verbal cues  -JE      Distance in Feet (Gait)  250 ft  -JE      Pattern (Gait)  step-through  -JE      Deviations/Abnormal Patterns (Gait)  base of support, narrow  -JE      Left Sided Gait Deviations  — L foot IR  -JE      Handrail Location  (Stairs)  none  -JE      Number of Steps (Stairs)  5  -JE      Ascending Technique (Stairs)  step-to-step;step-over-step  -JE      Descending Technique (Stairs)  step-to-step;step-over-step  -JE      Comment (Gait/Stairs)  initially performed steps - step over step, education for improved control and stability w/ step to step  -JE      Recorded by [JE] Janay Allison, PT 06/19/19 1610      Row Name 06/19/19 1148             ADL Assessment/Intervention    BADL Assessment/Intervention  feeding  -JJ      Recorded by [JJ] Sri Gamble OTR/L 06/19/19 1212      Row Name 06/19/19 1148             Self-Feeding Assessment/Training    [place] Level (Feeding)  scoop food and bring to mouth  -JJ      Comment (Feeding)  simulated feeding with bendable adaptive spoon, increased independence noted with decreased spillage.   -JJ      Recorded by [JJ] Sri Gamble OTR/L 06/19/19 1212      Row Name 06/19/19 1525 06/19/19 1148          Motor Skills Assessment/Interventions    Additional Documentation  Balance (Group);Therapeutic Exercise (Group)  -  Fine Motor Testing & Training (Group);Therapeutic Exercise Interventions (Group)  -JJ     Recorded by [JE] Janay Allison, PT 06/19/19 1610 [JJ] Sri Gamble, OTR/L 06/19/19 1403     Row Name 06/19/19 1525             Therapeutic Exercise    Therapeutic Exercise  standing, lower extremities  -      Additional Documentation  Therapeutic Exercise (Row)  -JE      90683 - PT Therapeutic Exercise Minutes  18  -JE      Recorded by [JE] Janay Allison, PT 06/19/19 1610      Row Name 06/19/19 1525             Lower Extremity Standing Therapeutic Exercise    Performed, Standing Lower Extremity (Therapeutic Exercise)  hip flexion/extension;mini-squats  -JE      Sets/Reps Detail, Standing Lower Extremity (Therapeutic Exercise)  10  -JE      Recorded by [JE] Janay Allison, PT 06/19/19 1610      Row Name 06/19/19 1525             Balance    Balance  static sitting  balance;dynamic balance activity;static standing balance  -JE      Recorded by [JE] Janay Allison, PT 06/19/19 1604      Row Name 06/19/19 1525             Static Sitting Balance    Level of [place] (Unsupported Sitting, Static Balance)  independent  -JE      Sitting Position (Unsupported Sitting, Static Balance)  sitting on edge of bed  -JE      Recorded by [JE] Janay Allison, PT 06/19/19 1604      Row Name 06/19/19 1525             Static Standing Balance    Level of [place] (Supported Standing, Static Balance)  contact guard assist;standby assist  -      Comment (Supported Standing, Static Balance)  handrail in hallway  -JE      Level of [place] (Unsupported Standing, Static Balance)  contact guard assist;standby assist  -JE      Recorded by [JE] Janay Allison, PT 06/19/19 1604      Row Name 06/19/19 1525             Dynamic Balance Activity    Therapeutic Training Performed (Dynamic Balance)  backward walking;braiding;side stepping;other (see comments) toe/heel walking  -      Support Needed for Balance (Dynamic Balance Training)  uses one upper extremity part time for support;CGA;SBA;1 person assist  -      Progressive Balance Activity (Dynamic Balance Training)  other (see comments) head turns and counting backwards w/ gait  -JE      Recorded by [JE] Janay Allison, PT 06/19/19 1604      Row Name 06/19/19 1148             Fine Motor Testing & Training    Training Activity, Fine Motor Coordination  manipulation of pegs  -      Comment, Fine Motor Coordination  14 pegs, required assist Min A and verbal cues for placing cues back in holes.   -JJ      Recorded by [JJ] Sri Gamble OTR/L 06/19/19 1212      Row Name 06/19/19 1525 06/19/19 1148          Positioning and Restraints    Pre-Treatment Position  —  in bed  -JJ     Post Treatment Position  bed  -JE  bed  -JJ     In Bed  fowlers;call light within reach;encouraged to call for assist;side rails up x2  -JE  fowlers;call  light within reach;encouraged to call for assist;notified nsg;with family/caregiver;side rails up x2  -JJ     Recorded by [JE] Janay Allison, PT 06/19/19 1604 [JJ] Sri Gamble OTR/L 06/19/19 1212     Row Name 06/19/19 1525 06/19/19 1148 06/19/19 0852       Pain Assessment    Additional Documentation  Pain Scale: Numbers Pre/Post-Treatment (Group)  -JE  Pain Scale: Numbers Pre/Post-Treatment (Group)  -JJ  Pain Scale: FACES Pre/Post-Treatment (Group)  -BN,LT,BN2    Recorded by [JE] Janay Allison, PT 06/19/19 1536 [JJ] Sri Gamble OTR/L 06/19/19 1212 [BN,LT,BN2] Harriet Gilbert [facility]-SLP (r) Emi Boyer, Speech Therapy Student (t) Harriet Gilbert CCC-SLP (c) 06/19/19 0933    Row Name 06/19/19 1525 06/19/19 1148          Pain Scale: Numbers Pre/Post-Treatment    Pain Scale: Numbers, Pretreatment  3/10  -JE  3/10  -JJ     Pain Scale: Numbers, Post-Treatment  4/10  -JE2  3/10  -JJ     Pain Location - Side  Left  -JE  Left  -JJ     Pain Location - Orientation  other (see comments)  -JE  —     Pain Location  other (see comments) side, ribs  -JE  back ribs  -JJ     Pain Intervention(s)  Medication (See MAR);Ambulation/increased activity  -JE  Repositioned  -JJ     Recorded by [JE] Janay Allison, PT 06/19/19 1536  [JE2] Janay Allison, PT 06/19/19 1604 [JJ] Sri Gamble OTR/L 06/19/19 1212     Row Name 06/19/19 0852             Pain Scale: FACES Pre/Post-Treatment    Pain: FACES Scale, Pretreatment  0-->no hurt  -BN,LT,BN2      Pain: FACES Scale, Post-Treatment  0-->no hurt  -BN,LT,BN2      Recorded by [BN,LT,BN2] Harriet Glibert [facility]-SLP (r) Emi Boyer, Speech Therapy Student (t) Harriet Gilbert, CCC-SLP (c) 06/19/19 0985      Row Name 06/19/19 152             Sensory Assessment/Intervention    Sensory General Assessment  other (see comments) no reported or  noted c/os N/T  -JE      Recorded by [FIORDALIZA] Janay Allison, PT 06/19/19 1536      Row Name 06/19/19  1525             Coping    Observed Emotional State  accepting;calm;cooperative  -FIORDALIZA      Verbalized Emotional State  acceptance  -JE      Recorded by [JE] Janay Allison, PT 06/19/19 1536      Row Name 06/19/19 1525 06/19/19 1148          Plan of Care Review    Plan of Care Reviewed With  patient  -FIORDALIZA  patient;family  -JJ     Recorded by [JE] Janay Allison, PT 06/19/19 1536 [JJ] Sri Gamble OTR/L 06/19/19 1212     Row Name 06/19/19 1148             Outcome Summary/Treatment Plan (OT)    Daily Summary of Progress (OT)  progress toward functional goals is good  -JJ      Anticipated Discharge Disposition (OT)  home with assist;home with home health;home with OP services  -JJ      Recorded by [JJ] Sri Gamble OTR/L 06/19/19 1212      Row Name 06/19/19 1525             Outcome Summary/Treatment Plan (PT)    Daily Summary of Progress (PT)  progress toward functional goals is good  -FIORDALIZA      Barriers to Overall Progress (PT)  pain  -FIORDALIZA      Plan for Continued Treatment (PT)  continue to work on strengthening and balance improving I w/ functional mobility to reduce fall risk  -FIORDALIZA      Anticipated Discharge Disposition (PT)  home with assist;home with home health;home with OP services HH vs OP therapy  -FIORDALIZA      Recorded by [JE] Janay Allison, PT 06/19/19 1604      Row Name 06/19/19 0852             Outcome Summary/Treatment Plan (SLP)    Daily Summary of Progress (SLP)  prepare for discharge  -BN,LT,BN2      Barriers to Overall Progress (SLP)  n/a  -BN,LT,BN2      Plan for Continued Treatment (SLP)  discharge from speech services  -BN,LT,BN2      Anticipated Dischage Disposition  unknown  -BN,LT,BN2      Recorded by [BN,LT,BN2] Harriet Gilbert CCC-SLP (r) Emi Boyer Speech Therapy Student (t) Harriet Gilbert CCC-SLP (c) 06/19/19 0998        User Key  (r) = Recorded By, (t) = Taken By, (c) = Cosigned By    Initials Name Effective Dates Discipline    NW Dulce Maria Mendez, PTA 08/02/16  -  PT    Harriet Huston, CCC-SLP 04/03/18 -  SLP    Janay Ford, PT 08/02/18 -  PT    Sri Newby, OTR/L 10/12/18 -  OT    Emi Singh, Speech Therapy Student 04/24/19 -  SLP               Rehab Goal Summary     Row Name 06/19/19 0900             Swallow Goals (SLP)    Oral Nutrition/Hydration Goal Selection (SLP)  oral nutrition/hydration, SLP goal 1  -BN (r) LT (t) BN (c)      Labial Strengthening Goal Selection (SLP)  labial strengthening, SLP goal 1  -BN (r) LT (t) BN (c)      Lingual Strengthening Goal Selection (SLP)  lingual strengthening, SLP goal 1  -BN (r) LT (t) BN (c)      Additional Documentation  labial strengthening goal selection (SLP);lingual strengthening goal selection (SLP)  -BN (r) LT (t) BN (c)         Oral Nutrition/Hydration Goal 1 (SLP)    Oral Nutrition/Hydration Goal 1, SLP  Pt will tolerate LRD without overt s/s of aspiration.  -BN (r) LT (t) BN (c)      Time Frame (Oral Nutrition/Hydration Goal 1, SLP)  by discharge  -BN (r) LT (t) BN (c)      Progress/Outcomes (Oral Nutrition/Hydration Goal 1, SLP)  goal met  -BN (r) LT (t) BN (c)         Labial Strengthening Goal 1 (SLP)    Activity (Labial Strengthening Goal 1, SLP)  increase labial tone  -BN (r) LT (t) BN (c)      Increase Labial Tone  labial resistance exercises  -BN (r) LT (t) BN (c)      [place]/Accuracy (Labial Strengthening Goal 1, SLP)  independently (over 90% accuracy)  -BN (r) LT (t) BN (c)      Time Frame (Labial Strengthening Goal 1, SLP)  by discharge  -BN (r) LT (t) BN (c)      Progress/Outcomes (Labial Strengthening Goal 1, SLP)  goal no longer appropriate  -BN (r) LT (t) BN (c)         Lingual Strengthening Goal 1 (SLP)    Activity (Lingual Strengthening Goal 1, SLP)  increase lingual tone/sensation/control/coordination/movement  -BN (r) LT (t) BN (c)      Increase Lingual Tone/Sensation/Control/Coordination/Movement  lingual movement exercises  -BN (r) LT (t) BN (c)       [place]/Accuracy (Lingual Strengthening Goal 1, SLP)  independently (over 90% accuracy)  -BN (r) LT (t) BN (c)      Time Frame (Lingual Strengthening Goal 1, SLP)  by discharge  -BN (r) LT (t) BN (c)      Progress/Outcomes (Lingual Strengthening Goal 1, SLP)  goal no longer appropriate  -BN (r) LT (t) BN (c)        User Key  (r) = Recorded By, (t) = Taken By, (c) = Cosigned By    Initials Name Provider Type Discipline    Harriet Huston, CCC-SLP Speech and Language Pathologist SLP    Emi Singh, Speech Therapy Student Speech Therapy Student SLP          Physical Therapy Education     Title: PT OT SLP Therapies (Not Started)     Topic: Physical Therapy (In Progress)     Point: Mobility training (Done)     Learning Progress Summary           Patient Acceptance, E, VU,NR by MARJORIE at 6/17/2019  9:15 AM    Comment:  Educated pt/daughter on progression of PT POC and benefits of activity   Family Acceptance, E, VU,NR by MARJORIE at 6/17/2019  9:15 AM    Comment:  Educated pt/daughter on progression of PT POC and benefits of activity   Mother Acceptance, E, VU,NR by AMRJORIE at 6/17/2019  9:15 AM    Comment:  Educated pt/daughter on progression of PT POC and benefits of activity                   Point: Home exercise program (Done)     Learning Progress Summary           Patient Acceptance, E, VU,NR by HARMAN at 6/19/2019  3:37 AM                               User Key     Initials Effective Dates Name Provider Type Discipline    MARJORIE 08/02/16 -  Jaime Olsen PT DPT Physical Therapist PT    VS 08/02/16 -  Kat Mccarthy LPN Licensed Nurse Nurse                PT Recommendation and Plan  Anticipated Discharge Disposition (PT): home with assist, home with home health, home with OP services(HH vs OP therapy)  Therapy Frequency (PT Clinical Impression): daily  Outcome Summary/Treatment Plan (PT)  Daily Summary of Progress (PT): progress toward functional goals is good  Barriers to Overall Progress (PT): pain  Plan for  Continued Treatment (PT): continue to work on strengthening and balance improving I w/ functional mobility to reduce fall risk  Anticipated Discharge Disposition (PT): home with assist, home with home health, home with OP services(HH vs OP therapy)  Plan of Care Reviewed With: patient  Progress: improving  Outcome Summary: PT visit completed this date w/ pt performing bed mobility w/ supervision to I, tfers w/ SBA and gait 250 ft w/ CGA w/out a device.  Demonstrates narrow YARED and IR L foot reporting old injury w/ chronic problem.  No LOB noted, clears feet w/out difficulty w/ good step length.  Ascended/descended 5 steps w/out a HR w/ CGA step over step initially, w/ education for increase safety then performed 2 nd time step to step.  Pt performed LE strengthening and balance exercises w/ CGA/SBA w/ and w/out a single UE support.  Pt motivated to improve and very cooperative w/ therapy.    Outcome Measures     Row Name 06/19/19 1213 06/17/19 1100 06/17/19 0915       How much help from another person do you currently need...    Turning from your back to your side while in flat bed without using bedrails?  —  —  3  -MARJORIE    Moving from lying on back to sitting on the side of a flat bed without bedrails?  —  —  3  -MARJORIE    Moving to and from a bed to a chair (including a wheelchair)?  —  —  3  -MARJORIE    Standing up from a chair using your arms (e.g., wheelchair, bedside chair)?  —  —  3  -MARJORIE    Climbing 3-5 steps with a railing?  —  —  3  -MARJORIE    To walk in hospital room?  —  —  3  -MARJORIE    AM-PAC 6 Clicks Score  —  —  18  -MARJORIE       How much help from another is currently needed...    Putting on and taking off regular lower body clothing?  2  -JJ  2  -MM  —    Bathing (including washing, rinsing, and drying)  2  -JJ  2  -MM  —    Toileting (which includes using toilet bed pan or urinal)  2  -JJ  2  -MM  —    Putting on and taking off regular upper body clothing  3  -JJ  2  -MM  —    Taking care of personal grooming (such as  brushing teeth)  3  -JJ  3  -MM  —    Eating meals  3  -JJ  3  -MM  —    Score  15  -JJ  14  -MM  —       Functional Assessment    Outcome Measure Options  AM-PAC 6 Clicks Daily Activity (OT)  -JJ  AM-PAC 6 Clicks Daily Activity (OT)  -MM  AM-PAC 6 Clicks Basic Mobility (PT)  -MARJORIE      User Key  (r) = Recorded By, (t) = Taken By, (c) = Cosigned By    Initials Name Provider Type    Jaime Haile, PT DPT Physical Therapist    MM Aaron Barron, OTR/L Occupational Therapist    Sri Newby, OTR/L Occupational Therapist         Time Calculation:   PT Charges     Row Name 06/19/19 1610 06/19/19 1525          Time Calculation    Start Time  1525  -JE  —     Stop Time  1610  -JE  —     Time Calculation (min)  45 min  -JE  —     PT Received On  06/19/19  -JE  —     PT Goal Re-Cert Due Date  06/27/19  -JE  —        Timed Charges    56962 - PT Therapeutic Exercise Minutes  —  18 -JE     00780 - Gait Training Minutes   —  27 -JE       User Key  (r) = Recorded By, (t) = Taken By, (c) = Cosigned By    Initials Name Provider Type    Janay Ford, PT Physical Therapist        Therapy Charges for Today     Code Description Service Date Service Provider Modifiers Qty    31055659794 HC PT THER PROC EA 15 MIN 6/19/2019 Janay Allison, PT GP 1    57709685809 HC GAIT TRAINING EA 15 MIN 6/19/2019 Janay Allison, PT GP 2          PT G-Codes  Outcome Measure Options: AM-PAC 6 Clicks Daily Activity (OT)  AM-PAC 6 Clicks Score: 18  Score: 15    Janay Allison, PT  6/19/2019

## 2019-06-19 NOTE — PLAN OF CARE
Problem: Patient Care Overview  Goal: Plan of Care Review  Outcome: Ongoing (interventions implemented as appropriate)   06/19/19 6651   OTHER   Outcome Summary Pt reports he is doing well, states his appetite is good. PO intake 75% of 1 documented meal. Pt states he usually eats at least 4 meals daily however tries to eat 6. Encourage PO intake, will continue to follow.    Coping/Psychosocial   Plan of Care Reviewed With patient   Plan of Care Review   Progress improving

## 2019-06-19 NOTE — PROGRESS NOTES
"    [facility] Medicine Services  INPATIENT PROGRESS NOTE    Patient Name: Lukasz Savage  Date of Admission: 6/14/2019  Today's Date: 06/19/19  Length of Stay: 5  Primary Care Physician: Linda Hoffman, DNP, APRN    Subjective   Chief Complaint: follow-up seizures and Astrocytoma  HPI   Patient feels a lot better today.  He denies having any seizures.  A family member at bedside does report that they noticed some \"twitching\" in the left side of his cheek.  He has been out of bed ambulating with physical therapy.  In fact, he wanted to get out of bed and show me how well he could walk.  Denies any pain.    Review of Systems   Neurological: Positive for seizures.        All pertinent negatives and positives are as above. All other systems have been reviewed and are negative unless otherwise stated.     Objective    Temp:  [97.6 °F (36.4 °C)-98.7 °F (37.1 °C)] 98 °F (36.7 °C)  Heart Rate:  [61-82] 82  Resp:  [16-20] 18  BP: (125-150)/(79-98) 150/94  Physical Exam   Constitutional: No distress.   HENT:   Head: Normocephalic.   Mouth/Throat: No oropharyngeal exudate.   Eyes: No scleral icterus.   Neck: No tracheal deviation present.   Pulmonary/Chest: Effort normal. No respiratory distress.   Abdominal: Soft.   Musculoskeletal: He exhibits no edema.   Neurological: He is alert. No cranial nerve deficit.   I ambulated with patient in the hallway this evening.  He did well.  Steady gait and required no assistance on my part   Skin: Skin is warm and dry. He is not diaphoretic.   Psychiatric: He has a normal mood and affect.   Vitals reviewed.      Results Review:  I have reviewed the labs, radiology results, and diagnostic studies.    Laboratory Data:   Results from last 7 days   Lab Units 06/18/19  0330 06/17/19  0515 06/16/19  0148   WBC 10*3/mm3 9.12 15.58* 15.75*   HEMOGLOBIN g/dL 11.0* 11.4* 11.8*   HEMATOCRIT % 32.6* 33.9* 35.2*   PLATELETS 10*3/mm3 178 195 196        Results " from last 7 days   Lab Units 06/18/19  0330 06/17/19  0515 06/16/19  0148 06/15/19  0228  06/14/19  0349   SODIUM mmol/L 141 140 141 143   < > 143   SODIUM, ARTERIAL   --   --   --   --    < >  --    POTASSIUM mmol/L 3.8 3.6 3.3* 3.9   < > 5.4*   CHLORIDE mmol/L 112* 111* 114* 115*   < > 110   CO2 mmol/L 22.0* 24.0 18.0* 20.0*   < > 28.0   BUN mg/dL 20 15 13 17   < > 27*   CREATININE mg/dL 1.09 1.08 1.07 1.21   < > 1.70*   CALCIUM mg/dL 8.3* 8.4 8.1* 8.2*   < > 8.9   BILIRUBIN mg/dL  --  0.5  --  0.7  --  0.3   ALK PHOS U/L  --  57  --  50  --  64   ALT (SGPT) U/L  --  65*  --  48  --  35   AST (SGOT) U/L  --  46*  --  50*  --  39   GLUCOSE mg/dL 102* 86 101* 68*   < > 113*    < > = values in this interval not displayed.       Culture Data:   Blood Culture   Date Value Ref Range Status   06/15/2019 No growth at 2 days  Preliminary     Respiratory Culture   Date Value Ref Range Status   06/16/2019 Light growth (2+) Normal Respiratory [place]  Preliminary       Radiology Data:   Imaging Results (last 24 hours)     ** No results found for the last 24 hours. **          I have reviewed the patient's current medications.     Assessment/Plan     Active Hospital Problems    Diagnosis   • Anemia   • Leukocytosis   • Status epilepticus (CMS/HCC)   • Hyperkalemia   • Acute kidney injury (CMS/HCC) on chronic kidney disease stage 3   • Astrocytoma brain tumor (CMS/HCC)   • Seizures (CMS/HCC)     Plan:  1.  MRI Brain and EEG results reviewed  2.  IV to PO Decadron with taper  3.  PO Depakote, Vimpat, and Keppra  4.  IVFs off  5.  Regular diet  6.  Seizure precautions  7.  PT and OT  8.  Appreciate consultants - NSGY, Neurology, and Oncology following  9.  Continue to monitor off of antimicrobial therapy  10.  D/C telemetry      En Caro MD   06/19/19   6:09 PM

## 2019-06-19 NOTE — PLAN OF CARE
Problem: Patient Care Overview  Goal: Plan of Care Review  Outcome: Ongoing (interventions implemented as appropriate)   06/19/19 1406   OTHER   Outcome Summary OT tx completed. Pt completed a simulated feeding task with bendable spoon for decreased spillage and increased independence with self-feeding. FMC task completed with pegs. Pt required Min A for placement of pegs back into hole. Recommend d/c home with assist and HH/OP services. Continue OT POC.,   Coping/Psychosocial   Plan of Care Reviewed With patient   Plan of Care Review   Progress improving

## 2019-06-19 NOTE — PROGRESS NOTES
Neurology Progress Note      Date of admission: 6/14/2019  3:46 AM  Date of visit: 6/18/2019    Chief Complaint:  F/U seizures    Subjective     Subjective:      No further seizures but had a couple episodes of subtle left facial twitches. He did undergo radiation treatment today.  His chemo is on hold for now    Medications:  Current Facility-Administered Medications   Medication Dose Route Frequency Provider Last Rate Last Dose   • acetaminophen (TYLENOL) tablet 650 mg  650 mg Oral Q4H PRN Robe Dorsey DO        Or   • acetaminophen (TYLENOL) suppository 650 mg  650 mg Rectal Q4H PRN Robe Dorsey DO       • dexamethasone (DECADRON) injection 6 mg  6 mg Intravenous Q6H Dillon Trevino MD   6 mg at 06/18/19 1801   • divalproex (DEPAKOTE) DR tablet 750 mg  750 mg Oral Q6H Doreen Zuniga MD   750 mg at 06/18/19 1801   • enoxaparin (LOVENOX) syringe 40 mg  40 mg Subcutaneous Q24H Jairon Webb MD   40 mg at 06/18/19 0851   • lacosamide (VIMPAT) tablet 150 mg  150 mg Oral Q12H Doreen Zuniga MD   150 mg at 06/18/19 0603   • levETIRAcetam (KEPPRA) tablet 1,250 mg  1,250 mg Oral BID Doreen Zuniga MD   1,250 mg at 06/18/19 0851   • LORazepam (ATIVAN) injection 1 mg  1 mg Intravenous Q5 Min PRN Jairon Webb MD   1 mg at 06/15/19 0135   • ondansetron (ZOFRAN) injection 4 mg  4 mg Intravenous Q6H PRN Robe Dorsey DO       • oxyCODONE-acetaminophen (PERCOCET) 5-325 MG per tablet 2 tablet  2 tablet Oral Q6H PRN En Caro MD   2 tablet at 06/18/19 1135   • pantoprazole (PROTONIX) EC tablet 40 mg  40 mg Oral Q AM Jairon Webb MD   40 mg at 06/18/19 0603   • potassium chloride (KAYCIEL) 20 MEQ/15ML (10%) solution 40 mEq  40 mEq Oral Daily Jairon Webb MD   40 mEq at 06/18/19 0823   • QUEtiapine (SEROquel) tablet 50 mg  50 mg Oral Nightly WebbJairon davidson MD   50 mg at 06/17/19 7095   • sodium chloride 0.9 % flush 3 mL  3 mL Intravenous  Q12H Robe Dorsey DO   3 mL at 06/18/19 0852   • sodium chloride 0.9 % flush 3-10 mL  3-10 mL Intravenous PRN Robe Dorsey DO       • sodium chloride 0.9 % infusion  20 mL/hr Intravenous Continuous En Caro MD 20 mL/hr at 06/18/19 0853 20 mL/hr at 06/18/19 0853   • venlafaxine XR (EFFEXOR-XR) 24 hr capsule 150 mg  150 mg Oral Daily Jairon Webb MD   150 mg at 06/18/19 0851       Review of Systems:   -A 14 point review of systems is completed and is negative   Objective     Objective      Vital Signs  Temp:  [97.8 °F (36.6 °C)-98.4 °F (36.9 °C)] 97.8 °F (36.6 °C)  Heart Rate:  [55-68] 66  Resp:  [12-22] 18  BP: (115-150)/(69-98) 147/94    Physical Exam:    HEENT:  Neck supple  CVS:  Regular rate and rhythm.  No murmurs  Carotid Examination:  No bruits  Lungs:  Clear to auscultation  Abdomen:  Non-tender, Non-distended  Extremities:  No signs of peripheral edema    Neurologic Exam:    -Awake, Alert, Oriented X 3  -No word finding difficulties  -No aphasia  -No dysarthria  -Follows simple and complex commands    Cranial nerves II through XII intact. Eomi No facial asymmetry hearing intact to voice. Head can turn bilaterally     Motor: (strength out of 5:  1= minimal movement, 2 = movement in plane of gravity, 3 = movement against gravity, 4 = movement against some resistance, 5 = full strength)    He moves all extremities against gravity    DTR:  2+ throughout in all four extremities    Sensory:  -Intact to light touch    Coordination/Gait:  -No ataxia     Results Review:    I reviewed the patient's new clinical results.    Lab Results (last 24 hours)     Procedure Component Value Units Date/Time    Respiratory Culture - Sputum, ET Suction [[id number]] Collected:  06/16/19 1406    Specimen:  Sputum from ET Suction Updated:  06/18/19 1019     Respiratory Culture Light growth (2+) Normal Respiratory [place]     Gram Stain Less than 25 WBCs per low power field      Few (2+) Epithelial cells  per low power field      Few (2+) Mixed gram positive evelio      Few (2+) Gram negative bacilli    Basic Metabolic Panel [[id number]]  (Abnormal) Collected:  06/18/19 0330    Specimen:  Blood Updated:  06/18/19 0447     Glucose 102 mg/dL      BUN 20 mg/dL      Creatinine 1.09 mg/dL      Sodium 141 mmol/L      Potassium 3.8 mmol/L      Chloride 112 mmol/L      CO2 22.0 mmol/L      Calcium 8.3 mg/dL      eGFR Non African Amer 71 mL/min/1.73      BUN/Creatinine Ratio 18.3     Anion Gap 7.0 mmol/L     Narrative:       GFR Normal >60  Chronic Kidney Disease <60  Kidney Failure <15    Phosphorus [[id number]]  (Normal) Collected:  06/18/19 0330    Specimen:  Blood Updated:  06/18/19 0447     Phosphorus 3.4 mg/dL     Magnesium [[id number]]  (Normal) Collected:  06/18/19 0330    Specimen:  Blood Updated:  06/18/19 0447     Magnesium 1.8 mg/dL     Valproic Acid Level, Total [[id number]]  (Abnormal) Collected:  06/18/19 0330    Specimen:  Blood Updated:  06/18/19 0441     Valproic Acid 32.4 mcg/mL     CBC (No Diff) [[id number]]  (Abnormal) Collected:  06/18/19 0330    Specimen:  Blood Updated:  06/18/19 0414     WBC 9.12 10*3/mm3      RBC 3.84 10*6/mm3      Hemoglobin 11.0 g/dL      Hematocrit 32.6 %      MCV 84.9 fL      MCH 28.6 pg      MCHC 33.7 g/dL      RDW 15.3 %      RDW-SD 47.1 fl      MPV 11.3 fL      Platelets 178 10*3/mm3     Blood Culture - Blood, Arm, Left [[id number]] Collected:  06/15/19 2206    Specimen:  Blood from Arm, Left Updated:  06/17/19 2230     Blood Culture No growth at 2 days        Imaging Results (last 24 hours)     Procedure Component Value Units Date/Time    MRI Brain With & Without Contrast [[id number]] Collected:  06/17/19 1842     Updated:  06/17/19 2113    Narrative:          History:  54-year-old with seizures. Anaplastic astrocytoma.     Reference   Brain MRI June 2019.     Technique  Routine pre and postcontrast enhanced brain MRI.     Findings  There is no diffusion abnormality.     3.7 cm of  FLAIR hyperintensity involving the inferior/posterior right  frontal lobe with involvement of the cortex is unchanged. This is  immediately cephalad to the right lateral ventricular body. There is 3  cm necrotic enhancing soft tissue immediately below this area of FLAIR  signal causing mass effect but not clearly invading the right corpus  callosum body (see sagittal postcontrast images). This is all consistent  with patient's known astrocytoma. There is additional FLAIR signal on  both sides of the right central sulcus with faint contrast enhancement  in the precentral gyrus. This is compatible with tumor extension. No  tumor is visible in the left cerebral hemisphere or posterior fossa.  There is no brain herniation. No hemorrhage is seen. Dural venous  sinuses are patent. Ventricular system maintains normal caliber.          Impression:       Approximately 3 cm enhancing partially necrotic tumor immediately above  the right corpus callosum body. Associated FLAIR signal is noted  immediately above it. Faint enhancement and FLAIR signal extending to  the right precentral gyrus, consistent with additional tumor extension.  This report was finalized on 06/17/2019 21:09 by Dr Eugene Thomas, .          Assessment/Plan     Hospital Problem List      Seizures (CMS/HCC)    Astrocytoma brain tumor (CMS/HCC)    Status epilepticus (CMS/HCC)    Hyperkalemia    Acute kidney injury (CMS/HCC) on chronic kidney disease stage 3    Leukocytosis    Anemia    Impression:  1. Seizures  2. Inoperable anaplastic glioma x 2  And had received on Temodar/XRT x 2 this past week and XRT today  3. Subtherapeutic Depakote level  Plan:  · Sister wanting to start him on CBD oil for his nausea when he gets chemo  · We discussed Zyprexa which is very effective in treating nausea in cancer patients.  · Increase Depakote   · Continue to monitor valproate levels.        Doreen Parker MD  06/18/19  8:07 PM

## 2019-06-19 NOTE — PLAN OF CARE
Problem: Patient Care Overview  Goal: Plan of Care Review  Outcome: Ongoing (interventions implemented as appropriate)   06/19/19 2218   OTHER   Outcome Summary PT visit completed this date w/ pt performing bed mobility w/ supervision to I, tfers w/ SBA and gait 250 ft w/ CGA w/out a device. Demonstrates narrow YARED and IR L foot reporting old injury w/ chronic problem. No LOB noted, clears feet w/out difficulty w/ good step length. Ascended/descended 5 steps w/out a HR w/ CGA step over step initially, w/ education for increase safety then performed 2 nd time step to step. Pt performed LE strengthening and balance exercises w/ CGA/SBA w/ and w/out a single UE support. Pt motivated to improve and very cooperative w/ therapy.    Coping/Psychosocial   Plan of Care Reviewed With patient   Plan of Care Review   Progress improving

## 2019-06-19 NOTE — THERAPY TREATMENT NOTE
Acute Care - Occupational Therapy Treatment Note  [facility]     Patient Name: Lukasz Savage  : 1965  MRN: [id number]  Today's Date: 2019  Onset of Illness/Injury or Date of Surgery: 19  Date of Referral to OT: 19  Referring Physician: Dr. Webb    Admit Date: 2019       ICD-10-CM ICD-9-CM   1. Status epilepticus (CMS/HCC) G40.901 345.3   2. 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 Dysphagia, unspecified type R13.10 787.20   4. Impaired mobility Z74.09 799.89   5. Impaired mobility and ADLs Z74.09 799.89     Patient Active Problem List   Diagnosis   • Hyperlipidemia   • HTN (hypertension)   • Gout   • Anxiety   • Arthritis   • Erectile dysfunction   • Angio-edema   • Seizures (CMS/HCC)   • HCAP (healthcare-associated pneumonia)   • MSSA (methicillin susceptible Staphylococcus aureus) infection   • Foot deformity   • Chronic gout of right foot   • Peripheral edema   • S/P shoulder surgery   • Claudication (CMS/HCC)   • Cigarette smoker   • BMI 30.0-30.9,adult   • BMI 33.0-33.9,adult   • Astrocytoma brain tumor (CMS/HCC)   • Encounter for consultation   • Benign neoplasm of brain (CMS/HCC)   • Status epilepticus (CMS/HCC)   • Hyperkalemia   • Acute kidney injury (CMS/HCC) on chronic kidney disease stage 3   • Leukocytosis   • Anemia     Past Medical History:   Diagnosis Date   • Anemia 2019   • Angio-edema 7/3/2017   • Anxiety    • Arthritis    • Cancer (CMS/HCC) 2019    Brain cancer diagnoised yesterday  Dr Trevino    • Clostridium difficile colitis    • Erectile dysfunction 10/6/2016   • GERD (gastroesophageal reflux disease)    • Gout    • HCAP (healthcare-associated pneumonia) 2017   • Hyperlipidemia    • Malignant hypertension    • MSSA (methicillin susceptible Staphylococcus aureus) infection 2017   • Obstructive sleep apnea    • Seizures (CMS/HCC) 2017     Past Surgical History:   Procedure Laterality Date   • COLONOSCOPY     • CRANIOTOMY  Right 4/15/2019    Procedure: CRANIOTOMY WITH BIOPSY RIGHT;  Surgeon: Dillon Trevino MD;  Location: [facility] OR;  Service: Neurosurgery   • SHOULDER ARTHROSCOPY Left    • TRACHEOSTOMY N/A 7/12/2017    Procedure: TRACHEOSTOMY WITH TRACHEOSCOPY;  Surgeon: Jairon Pierson MD;  Location: [facility] OR;  Service:        Therapy Treatment    Rehabilitation Treatment Summary     Row Name 06/19/19 1342 06/19/19 1148 06/19/19 1038       Treatment Time/Intention    Discipline  physical therapy assistant  -NW  occupational therapist  -JJ  physical therapy assistant  -NW    Document Type  --  therapy note (daily note)  -JJ  --    Subjective Information  --  no complaints  -JJ  --    Mode of Treatment  --  individual therapy;occupational therapy  -JJ  --    Patient/Family Observations  --  mother present in room   -JJ  --    Care Plan Review  --  evaluation/treatment results reviewed;care plan/treatment goals reviewed;risks/benefits reviewed;current/potential barriers reviewed;patient/other agree to care plan  -JJ  --    Therapy Frequency (OT Eval)  --  daily  -JJ  --    Patient Effort  --  good  -JJ  --    Comment  radiation  -NW  --  eating breakfast x1 working w/ nsg getting shower ck on x2 will ck bk  -NW    Existing Precautions/Restrictions  --  fall  -JJ  --    Recorded by [NW] Dulce Maria Mendez, PTA 06/19/19 1342 [JJ] Sri Gamble OTR/SRINIVAS 06/19/19 1212 [NW] Dulce Maria Mendez, PTA 06/19/19 1040    Row Name 06/19/19 0852             Treatment Time/Intention    Discipline  speech language pathologist  -BN,LT,BN2      Document Type  discharge treatment  -BN,LT,BN2      Subjective Information  no complaints  -BN,LT,BN2      Mode of Treatment  individual therapy;speech-language pathology  -BN,LT,BN2      Patient/Family Observations  mother present  -BN,LT,BN2      Care Plan Review  care plan/treatment goals reviewed;risks/benefits reviewed;current/potential barriers reviewed;patient/other agree to care plan  -BN,LT,BN2       Patient Effort  good  -BN,LT,BN2      Recorded by [BN,LT,BN2] Harriet Gilbert, ERICKSON-SLP (r) Emi Boyer, Speech Therapy Student (t) Harriet Gilbert, CCC-SLP (c) 06/19/19 0933      Row Name 06/19/19 1148             Bed Mobility Assessment/Treatment    Bed Mobility Assessment/Treatment  supine-sit  -JJ      Supine-Sit Sidney (Bed Mobility)  supervision  -JJ      Comment (Bed Mobility)  pt sitting EOB at end of session with family present in room   -JJ      Recorded by [JJ] Sri Gamble OTR/L 06/19/19 1403      Row Name 06/19/19 1148             ADL Assessment/Intervention    BADL Assessment/Intervention  feeding  -JJ      Recorded by [JJ] Sri Gamble OTR/L 06/19/19 1212      Row Name 06/19/19 1148             Self-Feeding Assessment/Training    S[place]ney Level (Feeding)  scoop food and bring to mouth  -JJ      Comment (Feeding)  simulated feeding with bendable adaptive spoon, increased independence noted with decreased spillage.   -JJ      Recorded by [JJ] Sri Gamble OTR/L 06/19/19 1212      Row Name 06/19/19 1148             Motor Skills Assessment/Interventions    Additional Documentation  Fine Motor Testing & Training (Group);Therapeutic Exercise Interventions (Group)  -JJ      Recorded by [JJ] Sri Gamble OTR/L 06/19/19 1403      Row Name 06/19/19 1148             Fine Motor Testing & Training    Training Activity, Fine Motor Coordination  manipulation of pegs  -JJ      Comment, Fine Motor Coordination  14 pegs, required assist Min A and verbal cues for placing cues back in holes.   -JJ      Recorded by [JJ] Sri Gamble OTR/L 06/19/19 1212      Row Name 06/19/19 1148             Positioning and Restraints    Pre-Treatment Position  in bed  -JJ      Post Treatment Position  bed  -JJ      In Bed  fowlers;call light within reach;encouraged to call for assist;notified nsg;with family/caregiver;side rails up x2  -JJ      Recorded by [JJ] Mavis  Sri OTR/L 06/19/19 1212      Row Name 06/19/19 1148 06/19/19 0852          Pain Assessment    Additional Documentation  Pain Scale: Numbers Pre/Post-Treatment (Group)  -JJ  Pain Scale: FACES Pre/Post-Treatment (Group)  -BN,LT,BN2     Recorded by [JJ] Sri Gamble OTR/L 06/19/19 1212 [BN,LT,BN2] Harriet Gilbert [facility]-SLP (r) Emi Boyer, Speech Therapy Student (t) Harriet Gilbert CCC-SLP (c) 06/19/19 0933     Row Name 06/19/19 1148             Pain Scale: Numbers Pre/Post-Treatment    Pain Scale: Numbers, Pretreatment  3/10  -JJ      Pain Scale: Numbers, Post-Treatment  3/10  -JJ      Pain Location - Side  Left  -JJ      Pain Location  back ribs  -JJ      Pain Intervention(s)  Repositioned  -JJ      Recorded by [JJ] Sri Gamble OTR/L 06/19/19 1212      Row Name 06/19/19 0852             Pain Scale: FACES Pre/Post-Treatment    Pain: FACES Scale, Pretreatment  0-->no hurt  -BN,LT,BN2      Pain: FACES Scale, Post-Treatment  0-->no hurt  -BN,LT,BN2      Recorded by [BN,LT,BN2] Harriet Gilbert [facility]-SLP (r) Emi Boyer, Speech Therapy Student (t) Harriet Gilbert CCC-SLP (c) 06/19/19 0933      Row Name 06/19/19 1148             Plan of Care Review    Plan of Care Reviewed With  patient;family  -JJ      Recorded by [JJ] Sri Gamble OTR/L 06/19/19 1212      Row Name 06/19/19 1148             Outcome Summary/Treatment Plan (OT)    Daily Summary of Progress (OT)  progress toward functional goals is good  -JJ      Anticipated Discharge Disposition (OT)  home with assist;home with home health;home with OP services  -J      Recorded by [JJ] Sri Gamble OTR/L 06/19/19 1212      Row Name 06/19/19 0852             Outcome Summary/Treatment Plan (SLP)    Daily Summary of Progress (SLP)  prepare for discharge  -SERINALT,BN2      Barriers to Overall Progress (SLP)  n/a  -SERINALT,BN2      Plan for Continued Treatment (SLP)  discharge from speech services  -SERINALT,SERINA2       Anticipated Dischage Disposition  unknown  -BN,LT,BN2      Recorded by [BN,LT,BN2] Harriet Gilbert CCC-SLP (r) Emi Boyer, Speech Therapy Student (t) Harriet Gilbert CCC-SLP (c) 06/19/19 0933        User Key  (r) = Recorded By, (t) = Taken By, (c) = Cosigned By    Initials Name Effective Dates Discipline    NW Dulce Maria Mendez, PTA 08/02/16 -  PT    Harriet Huston CCC-SLP 04/03/18 -  SLP    Sri Newby, OTR/L 10/12/18 -  OT    LT Emi Boyer, Speech Therapy Student 04/24/19 -  SLP           Rehab Goal Summary     Row Name 06/19/19 0900             Swallow Goals (SLP)    Oral Nutrition/Hydration Goal Selection (SLP)  oral nutrition/hydration, SLP goal 1  -BN (r) LT (t) BN (c)      Labial Strengthening Goal Selection (SLP)  labial strengthening, SLP goal 1  -BN (r) LT (t) BN (c)      Lingual Strengthening Goal Selection (SLP)  lingual strengthening, SLP goal 1  -BN (r) LT (t) BN (c)      Additional Documentation  labial strengthening goal selection (SLP);lingual strengthening goal selection (SLP)  -BN (r) LT (t) BN (c)         Oral Nutrition/Hydration Goal 1 (SLP)    Oral Nutrition/Hydration Goal 1, SLP  Pt will tolerate LRD without overt s/s of aspiration.  -BN (r) LT (t) BN (c)      Time Frame (Oral Nutrition/Hydration Goal 1, SLP)  by discharge  -BN (r) LT (t) BN (c)      Progress/Outcomes (Oral Nutrition/Hydration Goal 1, SLP)  goal met  -BN (r) LT (t) BN (c)         Labial Strengthening Goal 1 (SLP)    Activity (Labial Strengthening Goal 1, SLP)  increase labial tone  -BN (r) LT (t) BN (c)      Increase Labial Tone  labial resistance exercises  -BN (r) LT (t) BN (c)      [facility]/Accuracy (Labial Strengthening Goal 1, SLP)  independently (over 90% accuracy)  -BN (r) LT (t) BN (c)      Time Frame (Labial Strengthening Goal 1, SLP)  by discharge  -BN (r) LT (t) BN (c)      Progress/Outcomes (Labial Strengthening Goal 1, SLP)  goal no longer appropriate  -BN (r) LT (t)  BN (c)         Lingual Strengthening Goal 1 (SLP)    Activity (Lingual Strengthening Goal 1, SLP)  increase lingual tone/sensation/control/coordination/movement  -BN (r) LT (t) BN (c)      Increase Lingual Tone/Sensation/Control/Coordination/Movement  lingual movement exercises  -BN (r) LT (t) BN (c)      [place]/Accuracy (Lingual Strengthening Goal 1, SLP)  independently (over 90% accuracy)  -BN (r) LT (t) BN (c)      Time Frame (Lingual Strengthening Goal 1, SLP)  by discharge  -BN (r) LT (t) BN (c)      Progress/Outcomes (Lingual Strengthening Goal 1, SLP)  goal no longer appropriate  -BN (r) LT (t) BN (c)        User Key  (r) = Recorded By, (t) = Taken By, (c) = Cosigned By    Initials Name Provider Type Discipline    Harriet Huston, CCC-SLP Speech and Language Pathologist SLP    Emi Singh, Speech Therapy Student Speech Therapy Student SLP        Occupational Therapy Education     Title: PT OT SLP Therapies (Not Started)     Topic: Occupational Therapy (In Progress)     Point: ADL training (Done)     Description: Instruct learner(s) on proper safety adaptation and remediation techniques during self care or transfers.   Instruct in proper use of assistive devices.    Learning Progress Summary           Patient Acceptance, E, VU by BERNARDO at 6/18/2019  1:55 PM    Comment:  Incorportating LUE in self-care tasks, adaptive equipment use.    Acceptance, E, VU,NR by MM at 6/17/2019 11:30 AM    Comment:  OT role, benefits, POC, d/c planning.   Family Acceptance, E, VU by BERNARDO at 6/18/2019  1:55 PM    Comment:  Incorportating LUE in self-care tasks, adaptive equipment use.    Acceptance, E, VU,NR by MM at 6/17/2019 11:30 AM    Comment:  OT role, benefits, POC, d/c planning.                   Point: Home exercise program (Done)     Description: Instruct learner(s) on appropriate technique for monitoring, assisting and/or progressing therapeutic exercises/activities.    Learning Progress Summary            Patient Acceptance, E, VU by BERNARDO at 6/18/2019  1:55 PM    Comment:  Incorportating LUE in self-care tasks, adaptive equipment use.   Family Acceptance, E, VU by BERNARDO at 6/18/2019  1:55 PM    Comment:  Incorportating LUE in self-care tasks, adaptive equipment use.                   Point: Precautions (Done)     Description: Instruct learner(s) on prescribed precautions during self-care and functional transfers.    Learning Progress Summary           Patient Acceptance, E, VU,NR by SARI at 6/17/2019 11:30 AM    Comment:  OT role, benefits, POC, d/c planning.   Family Acceptance, E, VU,NR by SARI at 6/17/2019 11:30 AM    Comment:  OT role, benefits, POC, d/c planning.                               User Key     Initials Effective Dates Name Provider Type Discipline     04/03/18 -  Aaron Barron, OTR/L Occupational Therapist OT    BERNARDO 10/12/18 -  Sri Gamble OTR/L Occupational Therapist OT                OT Recommendation and Plan  Outcome Summary/Treatment Plan (OT)  Daily Summary of Progress (OT): progress toward functional goals is good  Anticipated Discharge Disposition (OT): home with assist, home with home health, home with OP services  Therapy Frequency (OT Eval): daily  Daily Summary of Progress (OT): progress toward functional goals is good  Plan of Care Review  Plan of Care Reviewed With: patient  Plan of Care Reviewed With: patient  Outcome Summary: OT tx completed. Pt completed a simulated feeding task with bendable spoon for decreased spillage and increased independence with self-feeding. FMC task completed with pegs. Pt required Min A for placement of pegs back into hole. Recommend d/c home with assist and HH/OP services. Continue OT POC.,  Outcome Measures     Row Name 06/19/19 1213 06/17/19 1100 06/17/19 0915       How much help from another person do you currently need...    Turning from your back to your side while in flat bed without using bedrails?  --  --  3  -MARJORIE    Moving from lying on back  to sitting on the side of a flat bed without bedrails?  --  --  3  -MARJORIE    Moving to and from a bed to a chair (including a wheelchair)?  --  --  3  -MARJORIE    Standing up from a chair using your arms (e.g., wheelchair, bedside chair)?  --  --  3  -MARJORIE    Climbing 3-5 steps with a railing?  --  --  3  -MARJORIE    To walk in hospital room?  --  --  3  -MARJORIE    AM-PAC 6 Clicks Score  --  --  18  -MARJORIE       How much help from another is currently needed...    Putting on and taking off regular lower body clothing?  2  -JJ  2  -MM  --    Bathing (including washing, rinsing, and drying)  2  -JJ  2  -MM  --    Toileting (which includes using toilet bed pan or urinal)  2  -JJ  2  -MM  --    Putting on and taking off regular upper body clothing  3  -JJ  2  -MM  --    Taking care of personal grooming (such as brushing teeth)  3  -JJ  3  -MM  --    Eating meals  3  -JJ  3  -MM  --    Score  15  -JJ  14  -MM  --       Functional Assessment    Outcome Measure Options  AM-PAC 6 Clicks Daily Activity (OT)  -JJ  AM-PAC 6 Clicks Daily Activity (OT)  -MM  AM-PAC 6 Clicks Basic Mobility (PT)  -MARJORIE      User Key  (r) = Recorded By, (t) = Taken By, (c) = Cosigned By    Initials Name Provider Type    Jaime Haile, PT DPT Physical Therapist    MM Aaron Barron, OTR/L Occupational Therapist    Sri Newby OTR/L Occupational Therapist           Time Calculation:   Time Calculation- OT     Row Name 06/19/19 1404             Time Calculation- OT    OT Start Time  1148 add 22 minutes for additional tx time completed from [id number]  -      OT Stop Time  1226  -      OT Time Calculation (min)  38 min  -      Total Timed Code Minutes- OT  60 minute(s)  -      OT Received On  06/19/19  -        User Key  (r) = Recorded By, (t) = Taken By, (c) = Cosigned By    Initials Name Provider Type    Sri Newby OTR/L Occupational Therapist        Therapy Charges for Today     Code Description Service Date Service Provider  Modifiers Qty    44928091341 HC OT SELF CARE/MGMT/TRAIN EA 15 MIN 6/18/2019 Sri Gamble OTR/L GO 3    63999673045 HC OT SELF CARE/MGMT/TRAIN EA 15 MIN 6/19/2019 Sri Gamble OTR/L GO 2    32462727335 HC OT THERAPEUTIC ACT EA 15 MIN 6/19/2019 Sri Gamble OTR/L GO 2               Sri Gamble OTR/SRINIVAS  6/19/2019

## 2019-06-19 NOTE — PROGRESS NOTES
Neurology Progress Note      Date of admission: 6/14/2019  3:46 AM  Date of visit: 6/19/2019    Chief Complaint:  F/u Seizures    Subjective     Subjective:    No further seizure. He is speaking normally today and his cognition has significantly improved.  He is answering questions quickly and correctly,. He is joking around and is in good spirits  Medications:  Current Facility-Administered Medications   Medication Dose Route Frequency Provider Last Rate Last Dose   • acetaminophen (TYLENOL) tablet 650 mg  650 mg Oral Q4H PRN Robe Dorsey DO        Or   • acetaminophen (TYLENOL) suppository 650 mg  650 mg Rectal Q4H PRN Robe Dorsey DO       • dexamethasone (DECADRON) injection 6 mg  6 mg Intravenous Q6H Dillon Trevino MD   6 mg at 06/19/19 1119   • divalproex (DEPAKOTE) DR tablet 1,250 mg  1,250 mg Oral Q8H Doreen Zuniga MD   1,250 mg at 06/19/19 1525   • enoxaparin (LOVENOX) syringe 40 mg  40 mg Subcutaneous Q24H Jairon Webb MD   40 mg at 06/19/19 0844   • lacosamide (VIMPAT) tablet 150 mg  150 mg Oral Q12H Doreen Zuniga MD   150 mg at 06/19/19 0844   • levETIRAcetam (KEPPRA) tablet 1,250 mg  1,250 mg Oral BID Doreen Zuniga MD   1,250 mg at 06/19/19 0844   • LORazepam (ATIVAN) injection 1 mg  1 mg Intravenous Q5 Min PRN Jairon Webb MD   1 mg at 06/15/19 0135   • ondansetron (ZOFRAN) injection 4 mg  4 mg Intravenous Q6H PRN Robe Dorsey DO       • oxyCODONE-acetaminophen (PERCOCET) 5-325 MG per tablet 2 tablet  2 tablet Oral Q6H PRN En Caro MD   2 tablet at 06/18/19 1135   • pantoprazole (PROTONIX) EC tablet 40 mg  40 mg Oral Q AM Jairon Webb MD   40 mg at 06/19/19 0621   • potassium chloride (KAYCIEL) 20 MEQ/15ML (10%) solution 40 mEq  40 mEq Oral Daily Jairon Webb MD   40 mEq at 06/19/19 0844   • QUEtiapine (SEROquel) tablet 50 mg  50 mg Oral Nightly Jairon Webb MD   50 mg at 06/18/19 2035   • sodium  chloride 0.9 % flush 3 mL  3 mL Intravenous Q12H Robe Dorsey, DO   3 mL at 06/19/19 0858   • sodium chloride 0.9 % flush 3-10 mL  3-10 mL Intravenous PRN Robe Dorsey, DO   3 mL at 06/19/19 0621   • sodium chloride 0.9 % infusion  20 mL/hr Intravenous Continuous En Caro MD 20 mL/hr at 06/18/19 0853 20 mL/hr at 06/18/19 0853   • venlafaxine XR (EFFEXOR-XR) 24 hr capsule 150 mg  150 mg Oral Daily Jairon Webb MD   150 mg at 06/19/19 0844       Review of Systems:   -A 14 point review of systems is completed and is negative    Objective     Objective      Vital Signs  Temp:  [97.6 °F (36.4 °C)-98.7 °F (37.1 °C)] 98 °F (36.7 °C)  Heart Rate:  [61-82] 82  Resp:  [16-20] 18  BP: (125-150)/(79-98) 150/94    Physical Exam:    HEENT:  Neck supple  CVS:  Regular rate and rhythm.  No murmurs  Carotid Examination:  No bruits  Lungs:  Clear to auscultation  Abdomen:  Non-tender, Non-distended  Extremities:  No signs of peripheral edema    Neurologic Exam:    -Awake, Alert, Oriented X 3  -No word finding difficulties  -No aphasia  -No dysarthria  -Follows simple and complex commands    Cranial nerves II through XII intact. EOMI No facial sensory loss.  No facial motor asymmetry.  Hearing intact to voice. Turns head side to side      Motor: (strength out of 5:  1= minimal movement, 2 = movement in plane of gravity, 3 = movement against gravity, 4 = movement against some resistance, 5 = full strength)    -Right Upper Ext: Proximal: 5 Distal: 5  -Left Upper Ext: Proximal: 5 Distal: 5    -Right Lower Ext: Proximal: 5 Distal: 5  -Left Lower Ext: Proximal: 5 Distal: 5    DTR:  2+ throughout in all four extremities    Sensory:  -Intact to light touch    Coordination/Gait:  -No ataxia     Results Review:    I reviewed the patient's new clinical results.    Lab Results (last 24 hours)     Procedure Component Value Units Date/Time    Respiratory Culture - Sputum, ET Suction [[id number]] Collected:  06/16/19  1406    Specimen:  Sputum from ET Suction Updated:  06/19/19 0805     Respiratory Culture Moderate growth (3+) Normal Respiratory Evelio     Gram Stain Less than 25 WBCs per low power field      Few (2+) Epithelial cells per low power field      Few (2+) Mixed gram positive evelio      Few (2+) Gram negative bacilli    Levetiracetam Level (Keppra) [[id number]]  (Abnormal) Collected:  06/14/19 0724    Specimen:  Blood Updated:  06/19/19 0716     Levetiracetam 45.2 ug/mL      Comment: This test was developed and its performance characteristics  determined by [facility]. It has not been cleared or approved  by the Food and Drug Administration.       Narrative:       Performed at:   - [address]  [id number]  : Yuko Sheridan MD, Phone:  [phone]    Valproic Acid Level, Total [[id number]]  (Normal) Collected:  06/19/19 0606    Specimen:  Blood Updated:  06/19/19 0702     Valproic Acid 51.4 mcg/mL     Blood Culture - Blood, Arm, Left [[id number]] Collected:  06/15/19 2206    Specimen:  Blood from Arm, Left Updated:  06/18/19 2230     Blood Culture No growth at 3 days        Imaging Results (last 24 hours)     ** No results found for the last 24 hours. **          Assessment/Plan     Hospital Problem List      Seizures (CMS/HCC)    Astrocytoma brain tumor (CMS/HCC)    Status epilepticus (CMS/HCC)    Hyperkalemia    Acute kidney injury (CMS/HCC) on chronic kidney disease stage 3    Leukocytosis    Anemia    Impression:  1. Seizures  2. Valproate barely therapeutic but should continue to increase on current dose  3.  Astrocytoma S/p XRT today again  Plan:  Monitor valproate levels and continue at current dose  Continue Vimpat and Keppra   The latter was  Reduced slightly due to renal problems and the above levels are from admission.         Doreen Parker MD  06/19/19  4:01 PM

## 2019-06-19 NOTE — PROGRESS NOTES
"    PROGRESS NOTE  Patient name: Lukasz Savage  Patient : 1965  VISIT # [id number]  MR #[id number]   CCU 9    SUBJECTIVE: Transfer to regular room.  \"Seizures\" resolved  Ambulated 6 steps yesterday.       INTERVAL HISTORY:  Mr. 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  He presented to the hospital with seizure activity and was admitted for treatment.  Medical oncology consultation was requested for continuity of care.     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 Trevino for diagnosis of primary brain tumor, grade 3 astrocytoma. The patient was being seen by neurology with complaints of left facial and upper extremity.     · 2019-MRI brain with contrast showed changes in the frontal convexity with a fullness of the sulcal gyral pattern. Specifically, 4.5 x 4.5 x 3.5 cm right frontal lesion. Second, discrete hyperintense nodule measuring 1.1 x 1.9 x 1.5 cm in the subcortical white matter of the paramedian posterior right frontal lobe immediately above the corpus callosum. This was concerning for high-grade glioma.   · 4/15/2019-he underwent a craniotomy with stereotactic biopsy by Dr. Dillon Trevino at [facility]. 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 Further molecular analysis at [facility] revealed IDH1/2 wild type, MGMT non-methylated, TERT mutation. Complex cytogenetics changes A maximum safe resection was not possible due to concerns of significant sequela. Second opinion was recommended at the [facility].   · 2019-he was seen by Dr. Oral Jessica. Recommended concurrent chemoradiation.   · 2019-he was first " seen by me. Recommended concurrent chemoradiation with Temodar.     REVIEW OF SYSTEMS:    Constitutional: no fever                      Lungs: normal respiratory effort     CVS: no palpitation, no chest pain  GI: no abdominal pain, no nausea , no vomiting  DIPAK: no dysuria, frequency and urgency, no hematuria  Musculoskeletal: no joint pain, swelling , stiffness  Endocrine: no polyuria, polydipsia  Hematology: Anemia, no easy brusing or bleeding  Dermatology: no skin rash, no eczema, no pruritus  Neurology: positive for anaplastic glioma, seizures      OBJECTIVE:    Vitals:    06/19/19 0700   BP: 139/92   Pulse: 71   Resp: 18   Temp: 98.7 °F (37.1 °C)   SpO2: 96%     No intake or output data in the 24 hours ending 06/19/19 0728  PHYSICAL EXAM:    CONSTITUTIONAL: Feeling better, no seizure activity   NECK: Supple, no masses   CHEST/LUNGS: CTA bilaterally, normal respiratory effort   CARDIOVASCULAR: RRR, no murmurs  ABDOMEN: soft non-tender, active bowel sounds, no HSM  EXTREMITIES: BLE SCD's  SKIN: warm, dry with no rashes or lesions      CBC  Results from last 7 days   Lab Units 06/18/19  0330 06/17/19  0515 06/16/19  0148   WBC 10*3/mm3 9.12 15.58* 15.75*   HEMOGLOBIN g/dL 11.0* 11.4* 11.8*   HEMATOCRIT % 32.6* 33.9* 35.2*   PLATELETS 10*3/mm3 178 195 196         Lab Results   Component Value Date     06/18/2019    K 3.8 06/18/2019     (H) 06/18/2019    CO2 22.0 (L) 06/18/2019    BUN 20 06/18/2019    CREATININE 1.09 06/18/2019    GLUCOSE 102 (H) 06/18/2019    CALCIUM 8.3 (L) 06/18/2019    BILITOT 0.5 06/17/2019    ALKPHOS 57 06/17/2019    AST 46 (H) 06/17/2019    ALT 65 (H) 06/17/2019    AGRATIO 1.1 06/17/2019    GLOB 2.6 06/17/2019       Lab Results   Component Value Date    INR 0.98 06/03/2019    INR 0.94 03/22/2018    PROTIME 13.3 06/03/2019    PROTIME 12.8 03/22/2018       Cultures:    Lab Results   Component Value Date    BLOODCX No growth at 3 days 06/15/2019     No components found for:  URINCX    CT HEAD WO CONTRAST-  6/14/2019 7:55 AM CDT     HISTORY: Seizures, history of brain tumor previous right-sided frontal  craniotomy.     COMPARISONS: 06/03/2019 head CT      TECHNIQUE:     Radiation dose equals  mGy-cm.  Automated exposure control dose  reduction technique was implemented.        CT evaluation of the head without intravenous contrast. 5 mm transaxial  images were obtained.   2-D sagittal and coronal reconstruction images  were generated.     FINDINGS: Examination was sent to statrad for preliminary  interpretation.     Changes from right craniotomy observed.     The right sided high density lesion is again observed superiorly in the  right frontal lobe, near the midline, extending from the corpus callosum  was noted previously and is essentially unchanged. Second probable high  density lesion observed more laterally in the right frontal lobe near  the craniotomy defect. The CT appearance is most likely unchanged.     There is no mass effect or midline shift.     There is no hydrocephalus.     There is no developing hemorrhage.     Mucosal thickening observed in the ethmoid sinuses.           IMPRESSION:  1. Stable CT appearance of the head compared to 06/03/2019, as described  above.                          MRI of brain on 6/17/2019    IMPRESSION:  1. Approximately 3 cm enhancing partially necrotic tumor immediately above  the right corpus callosum body with associated FLAIR signal immediately above it.  2. Faint enhancement and FLAIR signal extending to the right precentral gyrus, consistent with additional tumor extension.  This report was finalized on 06/17/2019 21:09 by Dr Eugene Thomas, .     ASSESSMENT/PLAN:  Anaplastic glioma  - He completed two (2) XRT treatment fractions and took Temodar for 2 days -- 6/12/19 and 6/13/19.    - XRT resumed on 6/18/2019  - Resume Temodar today.  - MRI of brain on 6/17/2019 documented stable tumor size with increased enhancement.      CBC on  6/18/2019  WBC 9.12, normalized  HGB 11.0  ,000        Seizure activity, resolved  - Dr. Trevino assisting and managing antiepileptic medications  · Vimpat  · Depakene  · Keppra     No overt seizure activity on EEG on 6/16/2019.  No further seizure activity.  MRI of brain on 6/17/2019 documented stable tumor size with increased enhancement.  Strength improving right > left.   Ambulated 6 steps yesterday.   Resume Temodar today. Spoke with COMPA Parrish to request family to bring Temodar from home.      EPI Ferguson    06/19/19  7:28 AM     I personally saw and examined this patient 6/19/2019 AM, performing a face-to-face diagnostic evaluation with plan of care reviewed and developed with  MICHELE Ferguson and nursing staff.   I have addended and/or modified the above history of present illness, physical examination, and assessment and plan to reflect my findings and impressions.   Essential elements of the care plan were discussed with  MICHELE Ferguson .   Agree with findings and assessment/plan as documented above.  Questions were encouraged, asked and answered to their understanding and satisfaction.    Delio Romero MD  6/20/2019 5:42 AM

## 2019-06-19 NOTE — PLAN OF CARE
Problem: Patient Care Overview  Goal: Plan of Care Review  Outcome: Ongoing (interventions implemented as appropriate)   06/19/19 0330   OTHER   Outcome Summary No seizure activity noted this shift. Has rested good. Denies pain and discomfort thus far this shift. Confused to time Oriented to the rest. Up with assist only. Bed check on. Safety maintained.    Coping/Psychosocial   Plan of Care Reviewed With patient   Plan of Care Review   Progress improving       Problem: Fall Risk (Adult)  Goal: Identify Related Risk Factors and Signs and Symptoms  Outcome: Outcome(s) achieved Date Met: 06/19/19    Goal: Absence of Fall  Outcome: Ongoing (interventions implemented as appropriate)      Problem: Seizure Disorder/Epilepsy (Adult)  Goal: Signs and Symptoms of Listed Potential Problems Will be Absent, Minimized or Managed (Seizure Disorder/Epilepsy)  Outcome: Ongoing (interventions implemented as appropriate)      Problem: Skin Injury Risk (Adult)  Goal: Identify Related Risk Factors and Signs and Symptoms  Outcome: Outcome(s) achieved Date Met: 06/19/19    Goal: Skin Health and Integrity  Outcome: Ongoing (interventions implemented as appropriate)      Problem: Pain, Chronic (Adult)  Goal: Identify Related Risk Factors and Signs and Symptoms  Outcome: Outcome(s) achieved Date Met: 06/19/19    Goal: Acceptable Pain/Comfort Level and Functional Ability  Outcome: Ongoing (interventions implemented as appropriate)

## 2019-06-19 NOTE — PROGRESS NOTES
"Lukasz Savage  54 y.o.      Chief complaint:   No complaints    Subjective  No events    Temp:  [97.6 °F (36.4 °C)-98.7 °F (37.1 °C)] 98.7 °F (37.1 °C)  Heart Rate:  [61-71] 71  Resp:  [16-18] 18  BP: (125-147)/(79-98) 139/92      Objective:  General Appearance:  Comfortable, well-appearing, in no acute distress and not in pain.    Vital signs: (most recent): Blood pressure 139/92, pulse 71, temperature 98.7 °F (37.1 °C), temperature source Oral, resp. rate 18, height 172.7 cm (68\"), weight 88.5 kg (195 lb 2.1 oz), SpO2 96 %.  Vital signs are normal.  No fever.    Output: Producing urine.    HEENT: Normal HEENT exam.    Lungs:  Normal effort and normal respiratory rate.  He is not in respiratory distress.    Heart: Normal rate.  Regular rhythm.    Chest: Symmetric chest wall expansion.   Extremities: Normal range of motion.    Skin:  Warm and dry.    Abdomen: Abdomen is soft and non-distended.  Bowel sounds are normal.   There is no abdominal tenderness.     Pulses: Distal pulses are intact.        Neurologic Exam     Mental Status   Oriented to person, place, and time.   Attention: normal. Concentration: normal.   Speech: speech is normal   Level of consciousness: alert    Cranial Nerves   Cranial nerves II through XII intact.     Motor Exam   Muscle bulk: normal    Strength   Strength 5/5 throughout.     Sensory Exam   Light touch normal.         Seizures (CMS/HCC)    Astrocytoma brain tumor (CMS/HCC)    Status epilepticus (CMS/HCC)    Hyperkalemia    Acute kidney injury (CMS/HCC) on chronic kidney disease stage 3    Leukocytosis    Anemia      Lab Results (last 24 hours)     Procedure Component Value Units Date/Time    Levetiracetam Level (Keppra) [[id number]]  (Abnormal) Collected:  06/14/19 0724    Specimen:  Blood Updated:  06/19/19 0716     Levetiracetam 45.2 ug/mL      Comment: This test was developed and its performance characteristics  determined by [employer]. It has not been cleared or approved  by the " Food and Drug Administration.       Narrative:       Performed at:  01 - [facility]  [id number]  : Yuko Sheridan MD, Phone:  [phone]    Valproic Acid Level, Total [[id number]]  (Normal) Collected:  06/19/19 0606    Specimen:  Blood Updated:  06/19/19 0702     Valproic Acid 51.4 mcg/mL     Blood Culture - Blood, Arm, Left [[id number]] Collected:  06/15/19 2206    Specimen:  Blood from Arm, Left Updated:  06/18/19 2230     Blood Culture No growth at 3 days    Respiratory Culture - Sputum, ET Suction [[id number]] Collected:  06/16/19 1406    Specimen:  Sputum from ET Suction Updated:  06/18/19 1019     Respiratory Culture Light growth (2+) Normal Respiratory Evelio     Gram Stain Less than 25 WBCs per low power field      Few (2+) Epithelial cells per low power field      Few (2+) Mixed gram positive evelio      Few (2+) Gram negative bacilli              Plan:   No seizures overnight.  Continue with current seizure medications.  Continue with plan for chemo and radiation    Tommie Thornton, APRN

## 2019-06-19 NOTE — THERAPY TREATMENT NOTE
Acute Care - Physical Therapy Treatment Note  [facility]     Patient Name: Lukasz Savage  : 1965  MRN: [id number]  Today's Date: 2019  Onset of Illness/Injury or Date of Surgery: 19  Date of Referral to PT: 19  Referring Physician: Dr. Webb    Admit Date: 2019    Visit Dx:    ICD-10-CM ICD-9-CM   1. Status epilepticus (CMS/HCC) G40.901 345.3   2. 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 Dysphagia, unspecified type R13.10 787.20   4. Impaired mobility Z74.09 799.89   5. Impaired mobility and ADLs Z74.09 799.89     Patient Active Problem List   Diagnosis   • Hyperlipidemia   • HTN (hypertension)   • Gout   • Anxiety   • Arthritis   • Erectile dysfunction   • Angio-edema   • Seizures (CMS/HCC)   • HCAP (healthcare-associated pneumonia)   • MSSA (methicillin susceptible Staphylococcus aureus) infection   • Foot deformity   • Chronic gout of right foot   • Peripheral edema   • S/P shoulder surgery   • Claudication (CMS/HCC)   • Cigarette smoker   • BMI 30.0-30.9,adult   • BMI 33.0-33.9,adult   • Astrocytoma brain tumor (CMS/HCC)   • Encounter for consultation   • Benign neoplasm of brain (CMS/HCC)   • Status epilepticus (CMS/HCC)   • Hyperkalemia   • Acute kidney injury (CMS/HCC) on chronic kidney disease stage 3   • Leukocytosis   • Anemia       Therapy Treatment    Rehabilitation Treatment Summary     Row Name 19 1525 19 1342 19 1148       Treatment Time/Intention    Discipline  physical therapist  -FIORDALIZA  physical therapy assistant  -NW  occupational therapist  -BERNARDO    Document Type  --  --  therapy note (daily note)  -JJEB    Subjective Information  --  --  no complaints  -JJEB    Mode of Treatment  --  --  individual therapy;occupational therapy  -JJ    Patient/Family Observations  no family present  -FIORDALIZA  --  mother present in room   -BERNARDO    Care Plan Review  evaluation/treatment results reviewed;care plan/treatment goals reviewed;risks/benefits  reviewed;current/potential barriers reviewed;patient/other agree to care plan  -JE  --  evaluation/treatment results reviewed;care plan/treatment goals reviewed;risks/benefits reviewed;current/potential barriers reviewed;patient/other agree to care plan  -JJ    Total Minutes, Physical Therapy Treatment  45  -JE2  --  --    Therapy Frequency (PT Clinical Impression)  daily  -JE3  --  --    Therapy Frequency (OT Eval)  --  --  daily  -JJ    Patient Effort  good  -JE3  --  good  -JJ    Comment  --  radiation  -NW  --    Existing Precautions/Restrictions  fall  -JE2  --  fall  -JJ    Patient Response to Treatment  no increase c/os  -JE2  --  --    Recorded by [JE] Janay Allison, PT 06/19/19 1533  [JE2] Janay Allison, PT 06/19/19 1615  [JE3] Janay Allison, PT 06/19/19 1536 [NW] Dulce Maria Mendez, PTA 06/19/19 1342 [JJ] Sri Gamble OTR/L 06/19/19 1212    Row Name 06/19/19 1038 06/19/19 0852          Treatment Time/Intention    Discipline  physical therapy assistant  -NW  speech language pathologist  -BN,LT,BN2     Document Type  --  discharge treatment  -BN,LT,BN2     Subjective Information  --  no complaints  -BN,LT,BN2     Mode of Treatment  --  individual therapy;speech-language pathology  -BN,LT,BN2     Patient/Family Observations  --  mother present  -BN,LT,BN2     Care Plan Review  --  care plan/treatment goals reviewed;risks/benefits reviewed;current/potential barriers reviewed;patient/other agree to care plan  -BN,LT,BN2     Patient Effort  --  good  -BN,LT,BN2     Comment  eating breakfast x1 working w/ nsg getting shower ck on x2 will ck bk  -NW  --     Recorded by [NW] Dulce Maria Mendez, PTA 06/19/19 1040 [BN,LT,BN2] Harriet Gilbert, CCC-SLP (r) Emi Boyer, Speech Therapy Student (t) Harriet Gilbert CCC-SLP (c) 06/19/19 0933     Row Name 06/19/19 1520             Cognitive Assessment/Intervention- PT/OT    Orientation Status (Cognition)  oriented x 4  -JE      Follows Commands  (Cognition)  WNL  -JE2      Safety Deficit (Cognitive)  safety precautions awareness  -JE2      Recorded by [JE] Janay Allison, PT 06/19/19 1536  [JE2] Janay Allison, PT 06/19/19 1615      Row Name 06/19/19 1525 06/19/19 1148          Bed Mobility Assessment/Treatment    Bed Mobility Assessment/Treatment  supine-sit;sit-supine;scooting/bridging  -JE  supine-sit  -JJ     Scooting/Bridging [place] (Bed Mobility)  supervision;independent  -JE  --     Supine-Sit [place] (Bed Mobility)  supervision;independent  -JE  supervision  -JJ     Sit-Supine [place] (Bed Mobility)  supervision;independent  -JE  --     Assistive Device (Bed Mobility)  head of bed elevated  -JE  --     Comment (Bed Mobility)  --  pt sitting EOB at end of session with family present in room   -JJ     Recorded by [JE] Janay Allison, PT 06/19/19 1615 [JJ] Sri Gamble OTR/L 06/19/19 1403     Row Name 06/19/19 1525             Transfer Assessment/Treatment    Transfer Assessment/Treatment  sit-stand transfer;stand-sit transfer  -JE      Recorded by [JE] Janay Allison, PT 06/19/19 1615      Row Name 06/19/19 1525             Sit-Stand Transfer    Sit-Stand [place] (Transfers)  stand by assist  -JE      Recorded by [JE] Janay Allison, PT 06/19/19 1615      Row Name 06/19/19 1525             Stand-Sit Transfer    Stand-Sit [place] (Transfers)  stand by assist  -JE      Recorded by [JE] Janay Allison, PT 06/19/19 1615      Row Name 06/19/19 1525             Gait/Stairs Assessment/Training    77159 - Gait Training Minutes   27  -JE      Gait/Stairs Assessment/Training  gait/ambulation independence;distance ambulated;gait pattern;gait deviations  -      [place] Level (Gait)  contact guard;verbal cues  -      Distance in Feet (Gait)  250 ft  -JE      Pattern (Gait)  step-through  -JE      Deviations/Abnormal Patterns (Gait)  base of support, narrow  -      Left Sided Gait Deviations  -- L foot IR  -JE       Handrail Location (Stairs)  none  -JE      Number of Steps (Stairs)  5  -JE      Ascending Technique (Stairs)  step-to-step;step-over-step  -JE      Descending Technique (Stairs)  step-to-step;step-over-step  -JE      Comment (Gait/Stairs)  initially performed steps - step over step, education for improved control and stability w/ step to step  -JE      Recorded by [JE] Janay Allison, PT 06/19/19 1610      Row Name 06/19/19 1148             ADL Assessment/Intervention    BADL Assessment/Intervention  feeding  -JJ      Recorded by [JJ] Sri Gamble OTR/L 06/19/19 1212      Row Name 06/19/19 1148             Self-Feeding Assessment/Training    [place] Level (Feeding)  scoop food and bring to mouth  -JJ      Comment (Feeding)  simulated feeding with bendable adaptive spoon, increased independence noted with decreased spillage.   -JJ      Recorded by [JJ] Sri Gamble OTR/L 06/19/19 1212      Row Name 06/19/19 1525 06/19/19 1148          Motor Skills Assessment/Interventions    Additional Documentation  Balance (Group);Therapeutic Exercise (Group)  -  Fine Motor Testing & Training (Group);Therapeutic Exercise Interventions (Group)  -JJ     Recorded by [JE] Janay Allison, PT 06/19/19 1610 [JJ] Sri Gamble OTR/L 06/19/19 1403     Row Name 06/19/19 1525             Therapeutic Exercise    Therapeutic Exercise  standing, lower extremities  -      Additional Documentation  Therapeutic Exercise (Row)  -      11939 - PT Therapeutic Exercise Minutes  18  -JE      Recorded by [JE] Janay Allison, PT 06/19/19 1610      Row Name 06/19/19 1525             Lower Extremity Standing Therapeutic Exercise    Performed, Standing Lower Extremity (Therapeutic Exercise)  hip flexion/extension;mini-squats  -JE      Sets/Reps Detail, Standing Lower Extremity (Therapeutic Exercise)  10  -JE      Recorded by [JE] Janay Allison, PT 06/19/19 1610      Row Name 06/19/19 1525             Balance     Balance  static sitting balance;dynamic balance activity;static standing balance  -      Recorded by [JE] Janay Allison, PT 06/19/19 1604      Row Name 06/19/19 1525             Static Sitting Balance    Level of [place] (Unsupported Sitting, Static Balance)  independent  -JE      Sitting Position (Unsupported Sitting, Static Balance)  sitting on edge of bed  -JE      Recorded by [JE] Janay Allison, PT 06/19/19 1604      Row Name 06/19/19 1525             Static Standing Balance    Level of [place] (Supported Standing, Static Balance)  contact guard assist;standby assist  -      Comment (Supported Standing, Static Balance)  handrail in hallway  -JE      Level of [place] (Unsupported Standing, Static Balance)  contact guard assist;standby assist  -JE      Recorded by [JE] Janay Allison, PT 06/19/19 1604      Row Name 06/19/19 1525             Dynamic Balance Activity    Therapeutic Training Performed (Dynamic Balance)  backward walking;braiding;side stepping;other (see comments) toe/heel walking  -      Support Needed for Balance (Dynamic Balance Training)  uses one upper extremity part time for support;CGA;SBA;1 person assist  -      Progressive Balance Activity (Dynamic Balance Training)  other (see comments) head turns and counting backwards w/ gait  -      Recorded by [JE] Janay Allison, PT 06/19/19 1604      Row Name 06/19/19 1148             Fine Motor Testing & Training    Training Activity, Fine Motor Coordination  manipulation of pegs  -      Comment, Fine Motor Coordination  14 pegs, required assist Min A and verbal cues for placing cues back in holes.   -JJ      Recorded by [JJ] Sri Gamble OTR/L 06/19/19 1212      Row Name 06/19/19 1525 06/19/19 1148          Positioning and Restraints    Pre-Treatment Position  --  in bed  -JJ     Post Treatment Position  bed  -JE  bed  -JJ     In Bed  fowlers;call light within reach;encouraged to call for assist;side rails  up x2  -JE  fowlers;call light within reach;encouraged to call for assist;notified nsg;with family/caregiver;side rails up x2  -JJ     Recorded by [JE] Janay Allison, PT 06/19/19 1604 [JJ] Sri Gamble OTR/L 06/19/19 1212     Row Name 06/19/19 1525 06/19/19 1148 06/19/19 0852       Pain Assessment    Additional Documentation  Pain Scale: Numbers Pre/Post-Treatment (Group)  -JE  Pain Scale: Numbers Pre/Post-Treatment (Group)  -JJ  Pain Scale: FACES Pre/Post-Treatment (Group)  -BN,LT,BN2    Recorded by [JE] Janay Allison, PT 06/19/19 1536 [JJ] Sri Gamble OTR/L 06/19/19 1212 [BN,LT,BN2] Harriet Gilbert [facility]SLP (r) Emi Boyer, Speech Therapy Student (t) Harriet Gilbert CCC-SLP (c) 06/19/19 0933    Row Name 06/19/19 1525 06/19/19 1148          Pain Scale: Numbers Pre/Post-Treatment    Pain Scale: Numbers, Pretreatment  3/10  -JE  3/10  -JJ     Pain Scale: Numbers, Post-Treatment  4/10  -JE2  3/10  -JJ     Pain Location - Side  Left  -JE  Left  -JJ     Pain Location - Orientation  other (see comments)  -  --     Pain Location  other (see comments) side, ribs  -JE  back ribs  -JJ     Pain Intervention(s)  Medication (See MAR);Ambulation/increased activity  -JE  Repositioned  -JJ     Recorded by [JE] Janay Allison, PT 06/19/19 1536  [JE2] Janay Allison, PT 06/19/19 1604 [JJ] Sri Gamble OTR/L 06/19/19 1212     Row Name 06/19/19 0852             Pain Scale: FACES Pre/Post-Treatment    Pain: FACES Scale, Pretreatment  0-->no hurt  -BN,LT,BN2      Pain: FACES Scale, Post-Treatment  0-->no hurt  -BN,LT,BN2      Recorded by [BN,LT,BN2] Harriet Gilbert, ERICKSON-SLP (r) Emi Boyer, Speech Therapy Student (t) Harriet Gilbert, CCC-SLP (c) 06/19/19 0933      Row Name 06/19/19 1525             Sensory Assessment/Intervention    Sensory General Assessment  other (see comments) no reported or  noted c/os N/T  -JE      Recorded by [JE] Janay Allison, PT 06/19/19  1536      Row Name 06/19/19 1525             Coping    Observed Emotional State  accepting;calm;cooperative  -FIORDALIZA      Verbalized Emotional State  acceptance  -JE      Recorded by [JE] Janay Allison, PT 06/19/19 1536      Row Name 06/19/19 1525 06/19/19 1148          Plan of Care Review    Plan of Care Reviewed With  patient  -FIORDALIZA  patient;family  -JJ     Recorded by [JE] Janay Allison, PT 06/19/19 1536 [JJ] Sri Gamble OTR/L 06/19/19 1212     Row Name 06/19/19 1148             Outcome Summary/Treatment Plan (OT)    Daily Summary of Progress (OT)  progress toward functional goals is good  -JJ      Anticipated Discharge Disposition (OT)  home with assist;home with home health;home with OP services  -JJ      Recorded by [JJ] Sri Gamble OTR/L 06/19/19 1212      Row Name 06/19/19 1525             Outcome Summary/Treatment Plan (PT)    Daily Summary of Progress (PT)  progress toward functional goals is good  -FIORDALIZA      Barriers to Overall Progress (PT)  pain  -FIORDALIZA      Plan for Continued Treatment (PT)  continue to work on strengthening and balance improving I w/ functional mobility to reduce fall risk  -FIORDALIZA      Anticipated Discharge Disposition (PT)  home with assist;home with home health;home with OP services HH vs OP therapy  -JE      Recorded by [JE] Janay Allison, PT 06/19/19 1604      Row Name 06/19/19 0852             Outcome Summary/Treatment Plan (SLP)    Daily Summary of Progress (SLP)  prepare for discharge  -BN,LT,BN2      Barriers to Overall Progress (SLP)  n/a  -BN,LT,BN2      Plan for Continued Treatment (SLP)  discharge from speech services  -BN,LT,BN2      Anticipated Dischage Disposition  unknown  -BN,LT,BN2      Recorded by [BN,LT,BN2] Harriet Gilbert CCC-SLP (r) Emi Boyer Speech Therapy Student (t) Harriet Gilbert CCC-SLP (c) 06/19/19 6609        User Key  (r) = Recorded By, (t) = Taken By, (c) = Cosigned By    Initials Name Effective Dates Discipline    NW  Dulce Maria Mendez, PTA 08/02/16 -  PT    Harriet Huston, CCC-SLP 04/03/18 -  SLP    Janay Ford, PT 08/02/18 -  PT    Sri Newby, OTR/L 10/12/18 -  OT    Emi Singh, Speech Therapy Student 04/24/19 -  SLP               Rehab Goal Summary     Row Name 06/19/19 1600 06/19/19 0900          Transfer Goal 1 (PT)    Activity/Assistive Device (Transfer Goal 1, PT)  sit-to-stand/stand-to-sit;bed-to-chair/chair-to-bed  -JE  --     [place] Level/Cues Needed (Transfer Goal 1, PT)  standby assist  -JE  --     Progress/Outcome (Transfer Goal 1, PT)  good progress toward goal  -JE  --        Gait Training Goal 1 (PT)    Activity/Assistive Device (Gait Training Goal 1, PT)  gait (walking locomotion);decrease fall risk;improve balance and speed;increase endurance/gait distance  -JE  --     [place] Level (Gait Training Goal 1, PT)  contact guard assist  -JE  --     Distance (Gait Goal 1, PT)  250 ft  -JE  --     Progress/Outcome (Gait Training Goal 1, PT)  good progress toward goal  -JE  --        Stairs Goal 1 (PT)    Activity/Assistive Device (Stairs Goal 1, PT)  ascending stairs;descending stairs  -JE  --     [place] Level/Cues Needed (Stairs Goal 1, PT)  contact guard assist  -JE  --     Number of Stairs (Stairs Goal 1, PT)  5  -JE  --     Progress/Outcome (Stairs Goal 1, PT)  good progress toward goal  -JE  --        [place] Goals (SLP)    Oral Nutrition/Hydration Goal Selection (SLP)  --  oral nutrition/hydration, SLP goal 1  -BN (r) LT (t) BN (c)     Labial Strengthening Goal Selection (SLP)  --  labial strengthening, SLP goal 1  -BN (r) LT (t) BN (c)     Lingual Strengthening Goal Selection (SLP)  --  lingual strengthening, SLP goal 1  -BN (r) LT (t) BN (c)     Additional Documentation  --  labial strengthening goal selection (SLP);lingual strengthening goal selection (SLP)  -BN (r) LT (t) BN (c)        Oral Nutrition/Hydration Goal 1 (SLP)    Oral Nutrition/Hydration Goal 1,  SLP  --  Pt will tolerate LRD without overt s/s of aspiration.  -BN (r) LT (t) BN (c)     Time Frame (Oral Nutrition/Hydration Goal 1, SLP)  --  by discharge  -BN (r) LT (t) BN (c)     Progress/Outcomes (Oral Nutrition/Hydration Goal 1, SLP)  --  goal met  -BN (r) LT (t) BN (c)        Labial Strengthening Goal 1 (SLP)    Activity (Labial Strengthening Goal 1, SLP)  --  increase labial tone  -BN (r) LT (t) BN (c)     Increase Labial Tone  --  labial resistance exercises  -BN (r) LT (t) BN (c)     [place]/Accuracy (Labial Strengthening Goal 1, SLP)  --  independently (over 90% accuracy)  -BN (r) LT (t) BN (c)     Time Frame (Labial Strengthening Goal 1, SLP)  --  by discharge  -BN (r) LT (t) BN (c)     Progress/Outcomes (Labial Strengthening Goal 1, SLP)  --  goal no longer appropriate  -BN (r) LT (t) BN (c)        Lingual Strengthening Goal 1 (SLP)    Activity (Lingual Strengthening Goal 1, SLP)  --  increase lingual tone/sensation/control/coordination/movement  -BN (r) LT (t) BN (c)     Increase Lingual Tone/Sensation/Control/Coordination/Movement  --  lingual movement exercises  -BN (r) LT (t) BN (c)     [place]/Accuracy (Lingual Strengthening Goal 1, SLP)  --  independently (over 90% accuracy)  -BN (r) LT (t) BN (c)     Time Frame (Lingual Strengthening Goal 1, SLP)  --  by discharge  -BN (r) LT (t) BN (c)     Progress/Outcomes (Lingual Strengthening Goal 1, SLP)  --  goal no longer appropriate  -BN (r) LT (t) BN (c)       User Key  (r) = Recorded By, (t) = Taken By, (c) = Cosigned By    Initials Name Provider Type Discipline    Harriet Huston, CCC-SLP Speech and Language Pathologist SLP    Janay Ford, PT Physical Therapist PT    Emi Singh, Speech Therapy Student Speech Therapy Student SLP          Physical Therapy Education     Title: PT OT SLP Therapies (Not Started)     Topic: Physical Therapy (In Progress)     Point: Mobility training (Done)     Learning Progress Summary            Patient Acceptance, E,TB,D, VU,DU,NR by FIORDALIZA at 6/19/2019  4:19 PM    Comment:  Education for safety w/ ascend/descending step w/ improved tech; education for improved technique w/ exercise for strengthening and balance    Acceptance, E, VU,NR by MARJORIE at 6/17/2019  9:15 AM    Comment:  Educated pt/daughter on progression of PT POC and benefits of activity   Family Acceptance, E, VU,NR by MARJORIE at 6/17/2019  9:15 AM    Comment:  Educated pt/daughter on progression of PT POC and benefits of activity   Mother Acceptance, E, VU,NR by MARJORIE at 6/17/2019  9:15 AM    Comment:  Educated pt/daughter on progression of PT POC and benefits of activity                   Point: Home exercise program (Done)     Learning Progress Summary           Patient Acceptance, E,TB,D, VU,DU,NR by FIORDALIZA at 6/19/2019  4:19 PM    Comment:  Education for safety w/ ascend/descending step w/ improved tech; education for improved technique w/ exercise for strengthening and balance    Acceptance, E, VU,NR by HARMAN at 6/19/2019  3:37 AM                   Point: Precautions (Done)     Learning Progress Summary           Patient Acceptance, E,TB,D, VU,DU,NR by FIORDALIZA at 6/19/2019  4:19 PM    Comment:  Education for safety w/ ascend/descending step w/ improved tech; education for improved technique w/ exercise for strengthening and balance                               User Key     Initials Effective Dates Name Provider Type Discipline     08/02/16 -  Jaime Olsen, PT DPT Physical Therapist PT    VS 08/02/16 -  Kat Mccarthy LPN Licensed Nurse Nurse     08/02/18 -  Janay Allison, PT Physical Therapist PT                PT Recommendation and Plan  Anticipated Discharge Disposition (PT): home with assist, home with home health, home with OP services(HH vs OP therapy)  Therapy Frequency (PT Clinical Impression): daily  Outcome Summary/Treatment Plan (PT)  Daily Summary of Progress (PT): progress toward functional goals is good  Barriers to Overall  Progress (PT): pain  Plan for Continued Treatment (PT): continue to work on strengthening and balance improving I w/ functional mobility to reduce fall risk  Anticipated Discharge Disposition (PT): home with assist, home with home health, home with OP services(HH vs OP therapy)  Plan of Care Reviewed With: patient  Progress: improving  Outcome Summary: PT visit completed this date w/ pt performing bed mobility w/ supervision to I, tfers w/ SBA and gait 250 ft w/ CGA w/out a device.  Demonstrates narrow YARED and IR L foot reporting old injury w/ chronic problem.  No LOB noted, clears feet w/out difficulty w/ good step length.  Ascended/descended 5 steps w/out a HR w/ CGA step over step initially, w/ education for increase safety then performed 2 nd time step to step.  Pt performed LE strengthening and balance exercises w/ CGA/SBA w/ and w/out a single UE support.  Pt motivated to improve and very cooperative w/ therapy.    Outcome Measures     Row Name 06/19/19 1213 06/17/19 1100 06/17/19 0915       How much help from another person do you currently need...    Turning from your back to your side while in flat bed without using bedrails?  --  --  3  -MARJORIE    Moving from lying on back to sitting on the side of a flat bed without bedrails?  --  --  3  -MARJORIE    Moving to and from a bed to a chair (including a wheelchair)?  --  --  3  -MARJORIE    Standing up from a chair using your arms (e.g., wheelchair, bedside chair)?  --  --  3  -MARJORIE    Climbing 3-5 steps with a railing?  --  --  3  -MARJORIE    To walk in hospital room?  --  --  3  -MARJORIE    AM-PAC 6 Clicks Score  --  --  18  -MARJORIE       How much help from another is currently needed...    Putting on and taking off regular lower body clothing?  2  -JJ  2  -MM  --    Bathing (including washing, rinsing, and drying)  2  -JJ  2  -MM  --    Toileting (which includes using toilet bed pan or urinal)  2  -JJ  2  -MM  --    Putting on and taking off regular upper body clothing  3  -JJ  2  -MM  --     Taking care of personal grooming (such as brushing teeth)  3  -JJ  3  -MM  --    Eating meals  3  -JJ  3  -MM  --    Score  15  -J  14  -MM  --       Functional Assessment    Outcome Measure Options  AM-PAC 6 Clicks Daily Activity (OT)  -JJ  AM-PAC 6 Clicks Daily Activity (OT)  -MM  AM-PAC 6 Clicks Basic Mobility (PT)  -MARJORIE      User Key  (r) = Recorded By, (t) = Taken By, (c) = Cosigned By    Initials Name Provider Type    Jaime Haile, PT DPT Physical Therapist    MM Aaron Barron, OTR/L Occupational Therapist    Sri Newby, OTR/L Occupational Therapist         Time Calculation:   PT Charges     Row Name 06/19/19 1610 06/19/19 1525          Time Calculation    Start Time  1525  -JE  --     Stop Time  1610  -  --     Time Calculation (min)  45 min  -  --     PT Received On  06/19/19  -  --     PT Goal Re-Cert Due Date  06/27/19  -  --        Timed Charges    32051 - PT Therapeutic Exercise Minutes  --  18 -JE     15466 - Gait Training Minutes   --  27 -JE       User Key  (r) = Recorded By, (t) = Taken By, (c) = Cosigned By    Initials Name Provider Type    Janay Ford, PT Physical Therapist        Therapy Charges for Today     Code Description Service Date Service Provider Modifiers Qty    98019982800 HC PT THER PROC EA 15 MIN 6/19/2019 Janay Allison, PT GP 1    [id number] HC GAIT TRAINING EA 15 MIN 6/19/2019 Janay Allison, PT GP 2          PT G-Codes  Outcome Measure Options: AM-PAC 6 Clicks Daily Activity (OT)  AM-PAC 6 Clicks Score: 18  Score: 15    Janay Allison, PT  6/19/2019

## 2019-06-20 VITALS
DIASTOLIC BLOOD PRESSURE: 85 MMHG | WEIGHT: 195.13 LBS | HEART RATE: 83 BPM | OXYGEN SATURATION: 92 % | RESPIRATION RATE: 18 BRPM | BODY MASS INDEX: 29.57 KG/M2 | SYSTOLIC BLOOD PRESSURE: 148 MMHG | HEIGHT: 68 IN | TEMPERATURE: 97.5 F

## 2019-06-20 DIAGNOSIS — R56.9 SEIZURES (HCC): Primary | ICD-10-CM

## 2019-06-20 DIAGNOSIS — C71.9 ASTROCYTOMA BRAIN TUMOR (HCC): ICD-10-CM

## 2019-06-20 LAB
BACTERIA SPEC AEROBE CULT: NORMAL
VALPROATE SERPL-MCNC: 44.5 MCG/ML (ref 50–100)

## 2019-06-20 PROCEDURE — 63710000001 DEXAMETHASONE PER 0.25 MG: Performed by: INTERNAL MEDICINE

## 2019-06-20 PROCEDURE — 97116 GAIT TRAINING THERAPY: CPT

## 2019-06-20 PROCEDURE — 97110 THERAPEUTIC EXERCISES: CPT

## 2019-06-20 PROCEDURE — 77336 RADIATION PHYSICS CONSULT: CPT | Performed by: RADIOLOGY

## 2019-06-20 PROCEDURE — 25010000002 ENOXAPARIN PER 10 MG: Performed by: INTERNAL MEDICINE

## 2019-06-20 PROCEDURE — 80164 ASSAY DIPROPYLACETIC ACD TOT: CPT | Performed by: PSYCHIATRY & NEUROLOGY

## 2019-06-20 PROCEDURE — 77412 RADIATION TX DELIVERY LVL 3: CPT | Performed by: RADIOLOGY

## 2019-06-20 PROCEDURE — 97535 SELF CARE MNGMENT TRAINING: CPT

## 2019-06-20 PROCEDURE — 99233 SBSQ HOSP IP/OBS HIGH 50: CPT | Performed by: PSYCHIATRY & NEUROLOGY

## 2019-06-20 RX ORDER — DEXAMETHASONE 2 MG/1
TABLET ORAL
Qty: 48 TABLET | Refills: 0 | Status: SHIPPED | OUTPATIENT
Start: 2019-06-20 | End: 2019-06-25

## 2019-06-20 RX ORDER — LACOSAMIDE 150 MG/1
150 TABLET ORAL EVERY 12 HOURS SCHEDULED
Qty: 60 TABLET | Refills: 0 | Status: SHIPPED | OUTPATIENT
Start: 2019-06-20 | End: 2019-06-20 | Stop reason: HOSPADM

## 2019-06-20 RX ORDER — DIVALPROEX SODIUM 500 MG/1
1500 TABLET, DELAYED RELEASE ORAL EVERY 8 HOURS SCHEDULED
Status: DISCONTINUED | OUTPATIENT
Start: 2019-06-20 | End: 2019-06-20 | Stop reason: HOSPADM

## 2019-06-20 RX ORDER — QUETIAPINE FUMARATE 50 MG/1
50 TABLET, FILM COATED ORAL NIGHTLY
Qty: 30 TABLET | Refills: 0 | Status: SHIPPED | OUTPATIENT
Start: 2019-06-20 | End: 2019-07-19 | Stop reason: HOSPADM

## 2019-06-20 RX ORDER — DIVALPROEX SODIUM 500 MG/1
1500 TABLET, DELAYED RELEASE ORAL EVERY 8 HOURS SCHEDULED
Qty: 90 TABLET | Refills: 0 | Status: ON HOLD | OUTPATIENT
Start: 2019-06-20 | End: 2019-07-19 | Stop reason: SDUPTHER

## 2019-06-20 RX ORDER — LEVETIRACETAM 500 MG/1
1250 TABLET ORAL 2 TIMES DAILY
Qty: 30 TABLET | Refills: 0 | Status: SHIPPED | OUTPATIENT
Start: 2019-06-20 | End: 2019-08-07 | Stop reason: DRUGHIGH

## 2019-06-20 RX ORDER — OXYCODONE HYDROCHLORIDE AND ACETAMINOPHEN 5; 325 MG/1; MG/1
1 TABLET ORAL EVERY 6 HOURS PRN
Qty: 10 TABLET | Refills: 0 | Status: SHIPPED | OUTPATIENT
Start: 2019-06-20 | End: 2019-06-25

## 2019-06-20 RX ORDER — LACOSAMIDE 50 MG/1
150 TABLET ORAL EVERY 12 HOURS SCHEDULED
Qty: 180 TABLET | Refills: 0 | Status: SHIPPED | OUTPATIENT
Start: 2019-06-20 | End: 2019-07-20

## 2019-06-20 RX ADMIN — PANTOPRAZOLE SODIUM 40 MG: 40 TABLET, DELAYED RELEASE ORAL at 05:42

## 2019-06-20 RX ADMIN — VALPROATE SODIUM 500 MG: 100 INJECTION, SOLUTION INTRAVENOUS at 14:15

## 2019-06-20 RX ADMIN — ACETAMINOPHEN 650 MG: 325 TABLET, FILM COATED ORAL at 08:25

## 2019-06-20 RX ADMIN — OXYCODONE HYDROCHLORIDE AND ACETAMINOPHEN 2 TABLET: 5; 325 TABLET ORAL at 12:06

## 2019-06-20 RX ADMIN — VENLAFAXINE HYDROCHLORIDE 150 MG: 75 CAPSULE, EXTENDED RELEASE ORAL at 08:20

## 2019-06-20 RX ADMIN — ENOXAPARIN SODIUM 40 MG: 40 INJECTION SUBCUTANEOUS at 08:21

## 2019-06-20 RX ADMIN — DEXAMETHASONE 6 MG: 4 TABLET ORAL at 13:44

## 2019-06-20 RX ADMIN — SODIUM CHLORIDE, PRESERVATIVE FREE 3 ML: 5 INJECTION INTRAVENOUS at 08:21

## 2019-06-20 RX ADMIN — OXYCODONE HYDROCHLORIDE AND ACETAMINOPHEN 2 TABLET: 5; 325 TABLET ORAL at 05:47

## 2019-06-20 RX ADMIN — DEXAMETHASONE 6 MG: 4 TABLET ORAL at 05:42

## 2019-06-20 RX ADMIN — DIVALPROEX SODIUM 1500 MG: 500 TABLET, DELAYED RELEASE ORAL at 13:44

## 2019-06-20 RX ADMIN — LACOSAMIDE 150 MG: 50 TABLET, FILM COATED ORAL at 08:20

## 2019-06-20 RX ADMIN — LEVETIRACETAM 1250 MG: 500 TABLET, FILM COATED ORAL at 08:19

## 2019-06-20 RX ADMIN — POTASSIUM CHLORIDE 40 MEQ: 20 SOLUTION ORAL at 08:22

## 2019-06-20 RX ADMIN — DIVALPROEX SODIUM 1250 MG: 500 TABLET, DELAYED RELEASE ORAL at 05:42

## 2019-06-20 NOTE — THERAPY TREATMENT NOTE
Acute Care - Physical Therapy Treatment Note  [facility]     Patient Name: Lukasz Savage  : 1965  MRN: [id number]  Today's Date: 2019  Onset of Illness/Injury or Date of Surgery: 19  Date of Referral to PT: 19  Referring Physician: Dr. Webb    Admit Date: 2019    Visit Dx:    ICD-10-CM ICD-9-CM   1. Status epilepticus (CMS/HCC) G40.901 345.3   2. 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 Dysphagia, unspecified type R13.10 787.20   4. Impaired mobility Z74.09 799.89   5. Impaired mobility and ADLs Z74.09 799.89     Patient Active Problem List   Diagnosis   • Hyperlipidemia   • HTN (hypertension)   • Gout   • Anxiety   • Arthritis   • Erectile dysfunction   • Angio-edema   • Seizures (CMS/HCC)   • HCAP (healthcare-associated pneumonia)   • MSSA (methicillin susceptible Staphylococcus aureus) infection   • Foot deformity   • Chronic gout of right foot   • Peripheral edema   • S/P shoulder surgery   • Claudication (CMS/HCC)   • Cigarette smoker   • BMI 30.0-30.9,adult   • BMI 33.0-33.9,adult   • Astrocytoma brain tumor (CMS/HCC)   • Encounter for consultation   • Benign neoplasm of brain (CMS/HCC)   • Status epilepticus (CMS/HCC)   • Hyperkalemia   • Acute kidney injury (CMS/HCC) on chronic kidney disease stage 3   • Leukocytosis   • Anemia       Therapy Treatment    Rehabilitation Treatment Summary     Row Name 19 1020 19 1011 19 0920       Treatment Time/Intention    Discipline  physical therapy assistant  -NW  occupational therapy assistant  -TS  physical therapy assistant  -NW    Document Type  therapy note (daily note)  -NW  therapy note (daily note)  -TS  --  -NW    Subjective Information  no complaints  -NW  --  --    Patient/Family Observations  family present  -NW  --  --    Patient Effort  good  -NW  --  --    Comment  --  --  radiation  -NW    Existing Precautions/Restrictions  fall  -NW  --  --    Recorded by [NW] Dulce Maria Mendez, NATALIE  06/20/19 1120 [TS] Enma Ellis, AGUILAR/L 06/20/19 1020 [NW] Dulce Maria Mendez, hospitals 06/20/19 0921    Row Name 06/20/19 1020             Safety Issues, Functional Mobility    Impairments Affecting Function (Mobility)  balance  -NW      Recorded by [NW] Dulce Maria Mendez, hospitals 06/20/19 1120      Row Name 06/20/19 1020             Bed Mobility Assessment/Treatment    Comment (Bed Mobility)  standing in room  -NW      Recorded by [NW] Dulce Maria Mendez, hospitals 06/20/19 1120      Row Name 06/20/19 1020             Stand-Sit Transfer    Assistive Device (Stand-Sit Transfers)  -- return to room independently after rx  -NW      Recorded by [NW] Dulce Maria Mendez, hospitals 06/20/19 1120      Row Name 06/20/19 1020             Gait/Stairs Assessment/Training    [place] Level (Gait)  conditional independence  -NW      Distance in Feet (Gait)  999  -NW      Pattern (Gait)  step-through  -NW      Handrail Location (Stairs)  none  -NW      Number of Steps (Stairs)  5  -NW      Ascending Technique (Stairs)  step-to-step  -NW      Descending Technique (Stairs)  step-to-step  -NW      Recorded by [NW] Dulce Maria Mendez, hospitals 06/20/19 1120      Row Name 06/20/19 1020             Dynamic Balance Activity    Therapeutic Training Performed (Dynamic Balance)  backward walking;figure eights;braiding;functional activities with reaching or leaning any direction;obstacle course;side stepping;360 degree turns  -NW      Comment (Dynamic Balance Training)  had pt working on functional activites w/ no LOB or concerns   -NW      Recorded by [NW] Dulce Maria Mendez, hospitals 06/20/19 1120      Row Name 06/20/19 1020             Positioning and Restraints    Pre-Treatment Position  standing in room  -NW      Post Treatment Position  other  -NW      Other Position  return to room independently  -NW      Recorded by [NW] Dulce Maria Mendez, hospitals 06/20/19 1120      Row Name 06/20/19 1020             Pain Scale: Numbers Pre/Post-Treatment    Pain Scale: Numbers,  Pretreatment  2/10  -NW      Pain Location - Side  Left  -NW      Pain Location - Orientation  -- ribs  -NW      Recorded by [NW] Dulce Maria Mendez, PTA 06/20/19 1120        User Key  (r) = Recorded By, (t) = Taken By, (c) = Cosigned By    Initials Name Effective Dates Discipline    TS Rhonda Enma DICKSON, AGUILAR/L 08/02/16 -  OT    NW Dulce Maria Mendez, PTA 08/02/16 -  PT                   Physical Therapy Education     Title: PT OT SLP Therapies (Not Started)     Topic: Physical Therapy (In Progress)     Point: Mobility training (Done)     Learning Progress Summary           Patient Acceptance, E,TB,D, VU,DU,NR by FIORDALIZA at 6/19/2019  4:19 PM    Comment:  Education for safety w/ ascend/descending step w/ improved tech; education for improved technique w/ exercise for strengthening and balance    Acceptance, E, VU,NR by MARJORIE at 6/17/2019  9:15 AM    Comment:  Educated pt/daughter on progression of PT POC and benefits of activity   Family Acceptance, E, VU,NR by MARJORIE at 6/17/2019  9:15 AM    Comment:  Educated pt/daughter on progression of PT POC and benefits of activity   Mother Acceptance, E, VU,NR by MARJORIE at 6/17/2019  9:15 AM    Comment:  Educated pt/daughter on progression of PT POC and benefits of activity                   Point: Home exercise program (Done)     Learning Progress Summary           Patient Acceptance, E,TB,D, VU,DU,NR by FIORDALIZA at 6/19/2019  4:19 PM    Comment:  Education for safety w/ ascend/descending step w/ improved tech; education for improved technique w/ exercise for strengthening and balance    Acceptance, E, VU,NR by HARMAN at 6/19/2019  3:37 AM                   Point: Precautions (Done)     Learning Progress Summary           Patient Acceptance, E,TB,D, VU,DU,NR by FIORDALIZA at 6/19/2019  4:19 PM    Comment:  Education for safety w/ ascend/descending step w/ improved tech; education for improved technique w/ exercise for strengthening and balance                               User Key     Initials Effective  Dates Name Provider Type Discipline    MARJORIE 08/02/16 -  Jaime Olsen, PT DPT Physical Therapist PT    VS 08/02/16 -  Kat Mccarthy, LPN Licensed Nurse Nurse     08/02/18 -  Janay Allison, PT Physical Therapist PT                PT Recommendation and Plan        Outcome Measures     Row Name 06/20/19 1100 06/19/19 1213          How much help from another person do you currently need...    Turning from your back to your side while in flat bed without using bedrails?  4  -NW  --     Moving from lying on back to sitting on the side of a flat bed without bedrails?  4  -NW  --     Moving to and from a bed to a chair (including a wheelchair)?  4  -NW  --     Standing up from a chair using your arms (e.g., wheelchair, bedside chair)?  4  -NW  --     Climbing 3-5 steps with a railing?  4  -NW  --     To walk in hospital room?  4  -NW  --     AM-PAC 6 Clicks Score  24  -NW  --        How much help from another is currently needed...    Putting on and taking off regular lower body clothing?  --  2  -JJ     Bathing (including washing, rinsing, and drying)  --  2  -JJ     Toileting (which includes using toilet bed pan or urinal)  --  2  -JJ     Putting on and taking off regular upper body clothing  --  3  -JJ     Taking care of personal grooming (such as brushing teeth)  --  3  -JJ     Eating meals  --  3  -JJ     Score  --  15  -JJ        Functional Assessment    Outcome Measure Options  AM-PAC 6 Clicks Basic Mobility (PT)  -NW  AM-PAC 6 Clicks Daily Activity (OT)  -JJ       User Key  (r) = Recorded By, (t) = Taken By, (c) = Cosigned By    Initials Name Provider Type    NW Dulce Maria Mendez, NATALIE Physical Therapy Assistant    Sri Newby, OTR/L Occupational Therapist         Time Calculation:   PT Charges     Row Name 06/20/19 1120             Time Calculation    Start Time  1020  -NW      Stop Time  1050  -NW      Time Calculation (min)  30 min  -NW      PT Received On  06/20/19  -NW      PT Goal Re-Cert Due  Date  06/27/19  -NW         Time Calculation- PT    Total Timed Code Minutes- PT  30 minute(s)  -NW         Timed Charges    88283 - PT Therapeutic Exercise Minutes  15  -NW      78355 - Gait Training Minutes   15  -NW        User Key  (r) = Recorded By, (t) = Taken By, (c) = Cosigned By    Initials Name Provider Type    NW Dulce Maria Mendez PTA Physical Therapy Assistant        Therapy Charges for Today     Code Description Service Date Service Provider Modifiers Qty    73161258785 HC PT THER PROC EA 15 MIN 6/20/2019 Dulce Maria Mendez PTA GP 1    43299200238 HC GAIT TRAINING EA 15 MIN 6/20/2019 Dulce Maria Mendez, NATALIE GP 1          PT G-Codes  Outcome Measure Options: AM-PAC 6 Clicks Basic Mobility (PT)  AM-PAC 6 Clicks Score: 24  Score: 15    Dulce Maria Mendez PTA  6/20/2019

## 2019-06-20 NOTE — PROGRESS NOTES
Neurology Progress Note      Date of admission: 6/14/2019  3:46 AM  Date of visit: 6/20/2019    Chief Complaint: Follow-up seizures    Subjective     Subjective:    No further reports of seizure activity.  However Dr. Webb did witness some left facial twitching today.  His Depakote level despite increasing dosage is actually decreasing  Medications:  Current Facility-Administered Medications   Medication Dose Route Frequency Provider Last Rate Last Dose   • acetaminophen (TYLENOL) tablet 650 mg  650 mg Oral Q4H PRN Robe Dorsey DO   650 mg at 06/20/19 0825   • dexamethasone (DECADRON) tablet 6 mg  6 mg Oral Q8H En Caro MD   6 mg at 06/20/19 1344   • divalproex (DEPAKOTE) DR tablet 1,500 mg  1,500 mg Oral Q8H Doreen Zuniga MD   1,500 mg at 06/20/19 1344   • enoxaparin (LOVENOX) syringe 40 mg  40 mg Subcutaneous Q24H Jairon Webb MD   40 mg at 06/20/19 0821   • lacosamide (VIMPAT) tablet 150 mg  150 mg Oral Q12H Doreen Zuniga MD   150 mg at 06/20/19 0820   • levETIRAcetam (KEPPRA) tablet 1,250 mg  1,250 mg Oral BID Doreen Zuniga MD   1,250 mg at 06/20/19 0819   • LORazepam (ATIVAN) injection 1 mg  1 mg Intravenous Q5 Min PRN Jairon Webb MD   1 mg at 06/15/19 0135   • ondansetron (ZOFRAN) injection 4 mg  4 mg Intravenous Q6H PRN Robe Dorsey DO       • oxyCODONE-acetaminophen (PERCOCET) 5-325 MG per tablet 2 tablet  2 tablet Oral Q6H PRN En Caro MD   2 tablet at 06/20/19 1206   • pantoprazole (PROTONIX) EC tablet 40 mg  40 mg Oral Q AM Jairon Webb MD   40 mg at 06/20/19 0542   • potassium chloride (KAYCIEL) 20 MEQ/15ML (10%) solution 40 mEq  40 mEq Oral Daily Jairon Webb MD   40 mEq at 06/20/19 0822   • promethazine (PHENERGAN) tablet 12.5 mg  12.5 mg Oral Q6H PRN En Caro MD       • QUEtiapine (SEROquel) tablet 50 mg  50 mg Oral Nightly Jairon Webb MD   50 mg at 06/19/19 2114   • sodium  chloride 0.9 % flush 3 mL  3 mL Intravenous Q12H Robe Dorsey, DO   3 mL at 06/20/19 0821   • sodium chloride 0.9 % flush 3-10 mL  3-10 mL Intravenous PRN Robe Dorsey, DO   3 mL at 06/19/19 0621   • temozolomide (TEMODAR) chemo capsule 140 mg  140 mg Oral Nightly En Caro MD   140 mg at 06/19/19 2116    And   • temozolomide (TEMODAR) chemo capsule 10 mg  10 mg Oral Nightly En Caro MD   10 mg at 06/19/19 2117   • valproate (DEPACON) 500 mg in sodium chloride 0.9 % 50 mL IVPB  500 mg Intravenous Once Doreen Zuniga MD       • venlafaxine XR (EFFEXOR-XR) 24 hr capsule 150 mg  150 mg Oral Daily Jairon Webb MD   150 mg at 06/20/19 0820       Review of Systems:   -A 14 point review of systems is completed and is negative.    Objective     Objective      Vital Signs  Temp:  [97.5 °F (36.4 °C)-98.4 °F (36.9 °C)] 97.5 °F (36.4 °C)  Heart Rate:  [71-89] 83  Resp:  [16-18] 18  BP: (134-157)/(81-98) 148/85    Physical Exam:    HEENT:  Neck supple  CVS:  Regular rate and rhythm.  No murmurs  Carotid Examination:  No bruits  Lungs:  Clear to auscultation  Abdomen:  Non-tender, Non-distended  Extremities:  No signs of peripheral edema    Neurologic Exam:    -Awake, Alert, Oriented X 3  -No word finding difficulties  -No aphasia  -No dysarthria  -Follows simple and complex commands    Cranial nerves II through XII intact. EOMi No facial weakness     Motor: (strength out of 5:  1= minimal movement, 2 = movement in plane of gravity, 3 = movement against gravity, 4 = movement against some resistance, 5 = full strength)    -Right Upper Ext: Proximal: 5 Distal: 5  -Left Upper Ext: Proximal: 5 Distal: 5    -Right Lower Ext: Proximal: 5 Distal: 5  -Left Lower Ext: Proximal: 5 Distal: 5    DTR:  2+ throughout in all four extremities    Sensory:  -Intact to light touch, pinprick, temperature, pain, and proprioception    Coordination/Gait:  -No ataxia     Results Review:    I reviewed  the patient's new clinical results.    Lab Results (last 24 hours)     Procedure Component Value Units Date/Time    Valproic Acid Level, Total [[id number]]  (Abnormal) Collected:  06/20/19 0630    Specimen:  Blood Updated:  06/20/19 0718     Valproic Acid 44.5 mcg/mL     Blood Culture - Blood, Arm, Left [[id number]] Collected:  06/15/19 2206    Specimen:  Blood from Arm, Left Updated:  06/19/19 2230     Blood Culture No growth at 4 days        Imaging Results (last 24 hours)     ** No results found for the last 24 hours. **          Assessment/Plan     Hospital Problem List      Seizures (CMS/HCC)    Astrocytoma brain tumor (CMS/HCC)    Status epilepticus (CMS/HCC)    Hyperkalemia    Acute kidney injury (CMS/HCC) on chronic kidney disease stage 3    Leukocytosis    Anemia    Impression:  1. Reported status epilepticus.  This had resolved .  He was intubated, on a propofol drip, continued on Vimpat, higher dose Keppra, and Depacon and was placed on continuous EEG.   2.  When his valproate level was down in the 40's he did have some subtle left facial twitching at the time of discharge.  We have been adjusting his Depakote dosage upwards.  Spite increase metformin thousand milligrams every 8-12 50 every 8 his valproate level dropped so his Depakote dosage will be increased again  3.  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  His typical 2 PM dose will be increased to that dose from 1250 and he should have the 1500 mg tonight as well.  · Tomorrow he will continue Depakote 1500 mg every 8 hours  · Continue Keppra 1250 mg every 12 hours  · Continue Vimpat 150 mg every 12 hours.  His previous hospitalization when he was on 200 mg every 12 hours he became bradycardic.  Unclear if this was due to Vimpat but when we reduced it back down to the 150 every 12 hours he no longer  was bradycardic  · I discussed this with YEVGENIY Blair.  He will be getting a free valproate level as well as a total valproate level in 4 days.  · His Depakote level will need to be closely monitored between now and the time of his neurology follow-up appointment.  Depakote will recheck another steady state after 30 days once again will need to be monitored.  · Because he was an alcoholic an ammonia level will need to be closely followed while he is on Depakote. As  long as he does not start drinking again as long as his ammonia level remains below 100 we can continue with his current dosage.  · When the ammonia levels checked it should be done fasting and in the morning and to obtain the sample sample when there is a very good flow to slow flow can alter the results      Doreen Parker MD  06/20/19  1:54 PM

## 2019-06-20 NOTE — THERAPY TREATMENT NOTE
Acute Care - Occupational Therapy Treatment Note  [facility]     Patient Name: Lukasz Savage  : 1965  MRN: [id number]  Today's Date: 2019  Onset of Illness/Injury or Date of Surgery: 19  Date of Referral to OT: 19  Referring Physician: Dr. Webb    Admit Date: 2019       ICD-10-CM ICD-9-CM   1. Status epilepticus (CMS/HCC) G40.901 345.3   2. 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 Dysphagia, unspecified type R13.10 787.20   4. Impaired mobility Z74.09 799.89   5. Impaired mobility and ADLs Z74.09 799.89     Patient Active Problem List   Diagnosis   • Hyperlipidemia   • HTN (hypertension)   • Gout   • Anxiety   • Arthritis   • Erectile dysfunction   • Angio-edema   • Seizures (CMS/HCC)   • HCAP (healthcare-associated pneumonia)   • MSSA (methicillin susceptible Staphylococcus aureus) infection   • Foot deformity   • Chronic gout of right foot   • Peripheral edema   • S/P shoulder surgery   • Claudication (CMS/HCC)   • Cigarette smoker   • BMI 30.0-30.9,adult   • BMI 33.0-33.9,adult   • Astrocytoma brain tumor (CMS/HCC)   • Encounter for consultation   • Benign neoplasm of brain (CMS/HCC)   • Status epilepticus (CMS/HCC)   • Hyperkalemia   • Acute kidney injury (CMS/HCC) on chronic kidney disease stage 3   • Leukocytosis   • Anemia     Past Medical History:   Diagnosis Date   • Anemia 2019   • Angio-edema 7/3/2017   • Anxiety    • Arthritis    • Cancer (CMS/HCC) 2019    Brain cancer diagnoised yesterday  Dr Trevino    • Clostridium difficile colitis    • Erectile dysfunction 10/6/2016   • GERD (gastroesophageal reflux disease)    • Gout    • HCAP (healthcare-associated pneumonia) 2017   • Hyperlipidemia    • Malignant hypertension    • MSSA (methicillin susceptible Staphylococcus aureus) infection 2017   • Obstructive sleep apnea    • Seizures (CMS/HCC) 2017     Past Surgical History:   Procedure Laterality Date   • COLONOSCOPY     • CRANIOTOMY  Right 4/15/2019    Procedure: CRANIOTOMY WITH BIOPSY RIGHT;  Surgeon: Dillon Trevino MD;  Location: [facility] OR;  Service: Neurosurgery   • SHOULDER ARTHROSCOPY Left    • TRACHEOSTOMY N/A 7/12/2017    Procedure: TRACHEOSTOMY WITH TRACHEOSCOPY;  Surgeon: Jairon Pierson MD;  Location:  [facility] OR;  Service:        Therapy Treatment    Rehabilitation Treatment Summary     Row Name 06/20/19 1020 06/20/19 1011 06/20/19 0920       Treatment Time/Intention    Discipline  physical therapy assistant  -NW  occupational therapy assistant  -TS  physical therapy assistant  -NW    Document Type  therapy note (daily note)  -NW  therapy note (daily note)  -TS  --  -NW    Subjective Information  no complaints  -NW  no complaints  -TS2  --    Patient/Family Observations  family present  -NW  mother present  -TS2  --    Patient Effort  good  -NW  good  -TS2  --    Comment  --  --  radiation  -NW    Existing Precautions/Restrictions  fall  -NW  fall  -TS2  --    Recorded by [NW] Dulce Maria Mendez, PTA 06/20/19 1120 [TS] Enma Ellis, AGUILAR/L 06/20/19 1020  [TS2] Enma Ellis, AGUILAR/L 06/20/19 1223 [NW] Dulce Maria Mendez, PTA 06/20/19 0921    Row Name 06/20/19 1011             Cognitive Assessment/Intervention- PT/OT    Personal Safety Interventions  fall prevention program maintained  -TS      Recorded by [TS] Enma Ellis, AGUILAR/L 06/20/19 1223      Row Name 06/20/19 1020             Safety Issues, Functional Mobility    Impairments Affecting Function (Mobility)  balance  -NW      Recorded by [NW] Dulce Maria Mendez, PTA 06/20/19 1120      Row Name 06/20/19 1020             Bed Mobility Assessment/Treatment    Comment (Bed Mobility)  standing in room  -NW      Recorded by [NW] Dulce Maria Mendez, PTA 06/20/19 1120      Row Name 06/20/19 1011             Functional Mobility    Functional Mobility- Comment  pt up ad yogesh in room  -TS      Recorded by [TS] Enma Ellis AGUILAR/L 06/20/19 1223      Row Name 06/20/19  1011             Transfer Assessment/Treatment    Transfer Assessment/Treatment  sit-stand transfer;stand-sit transfer  -TS      Recorded by [TS] Enma Ellis COTA/L 06/20/19 1223      Row Name 06/20/19 1011             Sit-Stand Transfer    Sit-Stand [place] (Transfers)  independent  -TS      Recorded by [TS] Enma Ellis COTA/L 06/20/19 1223      Row Name 06/20/19 1020 06/20/19 1011          Stand-Sit Transfer    Stand-Sit [place] (Transfers)  --  independent  -TS     Assistive Device (Stand-Sit Transfers)  -- return to room independently after rx  -NW  --     Recorded by [NW] Dulce Maria Mendez, PTA 06/20/19 1120 [TS] Enma Ellis COTA/L 06/20/19 1223     Row Name 06/20/19 1020             Gait/Stairs Assessment/Training    [place] Level (Gait)  conditional independence  -NW      Distance in Feet (Gait)  999  -NW      Pattern (Gait)  step-through  -NW      Handrail Location (Stairs)  none  -NW      Number of Steps (Stairs)  5  -NW      Ascending Technique (Stairs)  step-to-step  -NW      Descending Technique (Stairs)  step-to-step  -NW      Recorded by [NW] Dulce Maria Mendez, PTA 06/20/19 1120      Row Name 06/20/19 1011             ADL Assessment/Intervention    BADL Assessment/Intervention  bathing  -TS      Recorded by [TS] Enma Ellis AGUILAR/L 06/20/19 1223      Row Name 06/20/19 1011             Bathing Assessment/Intervention    Comment (Bathing)  discussed TB bathing modifications using LH bath sponge and seated on tub bench  -TS      Recorded by [TS] Enma Ellis AGUILAR/L 06/20/19 1223      Row Name 06/20/19 1020             Dynamic Balance Activity    Therapeutic Training Performed (Dynamic Balance)  backward walking;figure eights;braiding;functional activities with reaching or leaning any direction;obstacle course;side stepping;360 degree turns  -NW      Comment (Dynamic Balance Training)  had pt working on functional activites w/ no LOB or concerns    -NW      Recorded by [NW] Dulce Maria Mendez, PTA 06/20/19 1120      Row Name 06/20/19 1011             Fine Motor Testing & Training    Comment, Fine Motor Coordination  Issued FM/GM HEP for pt. Pt voiced understanding  -TS      Recorded by [TS] Enma Ellis COTA/L 06/20/19 1223      Row Name 06/20/19 1020 06/20/19 1011          Positioning and Restraints    Pre-Treatment Position  standing in room  -NW  in bed  -TS     Post Treatment Position  other  -NW  other  -TS     Other Position  return to room independently  -NW  return to room independently standing in room  -TS     Recorded by [NW] Dulce Maria Mendez, PTA 06/20/19 1120 [TS] Enma Ellis COTA/L 06/20/19 1223     Row Name 06/20/19 1020             Pain Scale: Numbers Pre/Post-Treatment    Pain Scale: Numbers, Pretreatment  2/10  -NW      Pain Location - Side  Left  -NW      Pain Location - Orientation  -- ribs  -NW      Recorded by [NW] Dulce Maria Mendez, PTA 06/20/19 1120      Row Name 06/20/19 1011             Outcome Summary/Treatment Plan (OT)    Daily Summary of Progress (OT)  progress toward functional goals is good  -TS      Recorded by [TS] Enma Ellis COTA/SRINIVAS 06/20/19 1223        User Key  (r) = Recorded By, (t) = Taken By, (c) = Cosigned By    Initials Name Effective Dates Discipline    TS Enma Ellis COTA/L 08/02/16 -  OT    NW Dulce Maria Mendez, PTA 08/02/16 -  PT           Rehab Goal Summary     Row Name 06/20/19 1200             Bathing Goal 1 (OT)    Activity/Assistive Device (Bathing Goal 1, OT)  bathing skills, all  -TS      [place] Level/Cues Needed (Bathing Goal 1, OT)  supervision required;set-up required  -TS      Time Frame (Bathing Goal 1, OT)  10 days  -TS      Progress/Outcomes (Bathing Goal 1, OT)  goal not met  -TS         Dressing Goal 1 (OT)    Activity/Assistive Device (Dressing Goal 1, OT)  dressing skills, all  -TS      [place]/Cues Needed (Dressing Goal 1, OT)  supervision  required;set-up required  -TS      Time Frame (Dressing Goal 1, OT)  10 days  -TS      Progress/Outcome (Dressing Goal 1, OT)  goal not met  -TS         Coordination Goal 1 (OT)    Activity/Assistive Device (Coordination Goal 1, OT)  FM written ex program;GM written ex program;FM task;GM task  -TS      [place] Level/Cues Needed (Coordination Goal 1, OT)  conditional independence;verbal cues required  -TS      Time Frame (Coordination Goal 1, OT)  10 days  -TS      Progress/Outcomes (Coordination Goal 1, OT)  goal met  -TS        User Key  (r) = Recorded By, (t) = Taken By, (c) = Cosigned By    Initials Name Provider Type Discipline    Enma Richards COTA/L Occupational Therapy Assistant OT        Occupational Therapy Education     Title: PT OT SLP Therapies (Not Started)     Topic: Occupational Therapy (In Progress)     Point: ADL training (Done)     Description: Instruct learner(s) on proper safety adaptation and remediation techniques during self care or transfers.   Instruct in proper use of assistive devices.    Learning Progress Summary           Patient Acceptance, E, VU by BERNARDO at 6/18/2019  1:55 PM    Comment:  Incorportating LUE in self-care tasks, adaptive equipment use.    Acceptance, E, VU,NR by SARI at 6/17/2019 11:30 AM    Comment:  OT role, benefits, POC, d/c planning.   Family Acceptance, E, VU by BERNARDO at 6/18/2019  1:55 PM    Comment:  Incorportating LUE in self-care tasks, adaptive equipment use.    Acceptance, E, VU,NR by SARI at 6/17/2019 11:30 AM    Comment:  OT role, benefits, POC, d/c planning.                   Point: Home exercise program (Done)     Description: Instruct learner(s) on appropriate technique for monitoring, assisting and/or progressing therapeutic exercises/activities.    Learning Progress Summary           Patient Acceptance, E, VU by BERNARDO at 6/18/2019  1:55 PM    Comment:  Incorportating LUE in self-care tasks, adaptive equipment use.   Family Acceptance, E, VU by BERNARDO at  6/18/2019  1:55 PM    Comment:  Incorportating LUE in self-care tasks, adaptive equipment use.                   Point: Precautions (Done)     Description: Instruct learner(s) on prescribed precautions during self-care and functional transfers.    Learning Progress Summary           Patient Acceptance, E, VU,NR by  at 6/17/2019 11:30 AM    Comment:  OT role, benefits, POC, d/c planning.   Family Acceptance, E, VU,NR by MM at 6/17/2019 11:30 AM    Comment:  OT role, benefits, POC, d/c planning.                               User Key     Initials Effective Dates Name Provider Type Discipline     04/03/18 -  Aaron Barron, OTR/L Occupational Therapist OT    JJEB 10/12/18 -  Sri Gamble OTR/L Occupational Therapist OT                OT Recommendation and Plan  Outcome Summary/Treatment Plan (OT)  Daily Summary of Progress (OT): progress toward functional goals is good  Daily Summary of Progress (OT): progress toward functional goals is good  Plan of Care Review  Plan of Care Reviewed With: patient  Plan of Care Reviewed With: patient  Outcome Summary: Pt up in room ad yogesh. Issued and educated on FM/GM HEP for coordination. AGUILAR discussed with pt and pt mother ADL modification and work simplification. Pt is currently back to his baseline prior to hospital admit and is discharged from OT this date.   Outcome Measures     Row Name 06/20/19 1200 06/20/19 1100 06/19/19 1213       How much help from another person do you currently need...    Turning from your back to your side while in flat bed without using bedrails?  --  4  -NW  --    Moving from lying on back to sitting on the side of a flat bed without bedrails?  --  4  -NW  --    Moving to and from a bed to a chair (including a wheelchair)?  --  4  -NW  --    Standing up from a chair using your arms (e.g., wheelchair, bedside chair)?  --  4  -NW  --    Climbing 3-5 steps with a railing?  --  4  -NW  --    To walk in hospital room?  --  4  -NW  --    AM-PAC  6 Clicks Score  --  24  -NW  --       How much help from another is currently needed...    Putting on and taking off regular lower body clothing?  4  -TS  --  2  -JJ    Bathing (including washing, rinsing, and drying)  4  -TS  --  2  -JJ    Toileting (which includes using toilet bed pan or urinal)  4  -TS  --  2  -JJ    Putting on and taking off regular upper body clothing  4  -TS  --  3  -JJ    Taking care of personal grooming (such as brushing teeth)  4  -TS  --  3  -JJ    Eating meals  4  -TS  --  3  -JJ    Score  24  -TS  --  15  -JJ       Functional Assessment    Outcome Measure Options  AM-PAC 6 Clicks Daily Activity (OT)  -TS  AM-PAC 6 Clicks Basic Mobility (PT)  -NW  AM-PAC 6 Clicks Daily Activity (OT)  -JJ      User Key  (r) = Recorded By, (t) = Taken By, (c) = Cosigned By    Initials Name Provider Type    TS Enma Ellis COTA/L Occupational Therapy Assistant    Dulce Maria Laird, NATALIE Physical Therapy Assistant    Sri Newby OTR/L Occupational Therapist           Time Calculation:   Time Calculation- OT     Row Name 06/20/19 1225 06/20/19 1120          Time Calculation- OT    OT Start Time  1011  -TS  --     OT Stop Time  1021  -TS  --     OT Time Calculation (min)  10 min  -TS  --     Total Timed Code Minutes- OT  10 minute(s)  -TS  --     OT Received On  06/20/19  -TS  --        Timed Charges    91400 - Gait Training Minutes   --  15  -NW     31272 - OT Self Care/Mgmt Minutes  10  -TS  --       User Key  (r) = Recorded By, (t) = Taken By, (c) = Cosigned By    Initials Name Provider Type     Enma Ellis COTA/L Occupational Therapy Assistant    Dulce Maria Laird, NATALIE Physical Therapy Assistant        Therapy Charges for Today     Code Description Service Date Service Provider Modifiers Qty    [id number]  OT SELF CARE/MGMT/TRAIN EA 15 MIN 6/20/2019 Enma Ellis COTA/L GO 1               Enma FORMAN. CHRISTAL Ellis  6/20/2019

## 2019-06-20 NOTE — PLAN OF CARE
Problem: Patient Care Overview  Goal: Plan of Care Review  Outcome: Ongoing (interventions implemented as appropriate)   06/20/19 8296   OTHER   Outcome Summary Pt A&Ox4. C/o left lower rib pain. PRN medication given with good relief. Speech is slurred. No seizure activity witnessed this shift. Meds to bed. Discharge home today.    Coping/Psychosocial   Plan of Care Reviewed With patient;mother   Plan of Care Review   Progress improving     Goal: Individualization and Mutuality  Outcome: Ongoing (interventions implemented as appropriate)    Goal: Discharge Needs Assessment  Outcome: Ongoing (interventions implemented as appropriate)    Goal: Interprofessional Rounds/Family Conf  Outcome: Ongoing (interventions implemented as appropriate)      Problem: Fall Risk (Adult)  Goal: Absence of Fall  Outcome: Ongoing (interventions implemented as appropriate)      Problem: Seizure Disorder/Epilepsy (Adult)  Goal: Signs and Symptoms of Listed Potential Problems Will be Absent, Minimized or Managed (Seizure Disorder/Epilepsy)  Outcome: Ongoing (interventions implemented as appropriate)      Problem: Skin Injury Risk (Adult)  Goal: Skin Health and Integrity  Outcome: Ongoing (interventions implemented as appropriate)      Problem: Pain, Chronic (Adult)  Goal: Acceptable Pain/Comfort Level and Functional Ability  Outcome: Ongoing (interventions implemented as appropriate)

## 2019-06-20 NOTE — PLAN OF CARE
Problem: Patient Care Overview  Goal: Plan of Care Review  Outcome: Ongoing (interventions implemented as appropriate)   06/20/19 0440   OTHER   Outcome Summary Medicated for pain as needed. Rested well this shift. Will cont to monitor.    Coping/Psychosocial   Plan of Care Reviewed With patient   Plan of Care Review   Progress improving       Problem: Fall Risk (Adult)  Goal: Absence of Fall  Outcome: Ongoing (interventions implemented as appropriate)      Problem: Seizure Disorder/Epilepsy (Adult)  Goal: Signs and Symptoms of Listed Potential Problems Will be Absent, Minimized or Managed (Seizure Disorder/Epilepsy)  Outcome: Ongoing (interventions implemented as appropriate)      Problem: Skin Injury Risk (Adult)  Goal: Skin Health and Integrity  Outcome: Ongoing (interventions implemented as appropriate)      Problem: Pain, Chronic (Adult)  Goal: Acceptable Pain/Comfort Level and Functional Ability  Outcome: Ongoing (interventions implemented as appropriate)

## 2019-06-20 NOTE — PROGRESS NOTES
PROGRESS NOTE  Patient name: Lukasz Savage  Patient : 1965  VISIT # [id number]  MR #[id number]   CCU 9    SUBJECTIVE: Participating with therapy without difficulty.  Denies seizure activity, although reports twitching to his left side of face intermittently.  Eager to be discharged.       INTERVAL HISTORY:  Mr. 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  He presented to the hospital with seizure activity and was admitted for treatment.  Medical oncology consultation was requested for continuity of care.     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 Trevino for diagnosis of primary brain tumor, grade 3 astrocytoma. The patient was being seen by neurology with complaints of left facial and upper extremity.     · 2019-MRI brain with contrast showed changes in the frontal convexity with a fullness of the sulcal gyral pattern. Specifically, 4.5 x 4.5 x 3.5 cm right frontal lesion. Second, discrete hyperintense nodule measuring 1.1 x 1.9 x 1.5 cm in the subcortical white matter of the paramedian posterior right frontal lobe immediately above the corpus callosum. This was concerning for high-grade glioma.   · 4/15/2019-he underwent a craniotomy with stereotactic biopsy by Dr. Dillon Trevino at [facility]. 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 Further molecular analysis at [facility] revealed IDH1/2 wild type, MGMT non-methylated, TERT mutation. Complex cytogenetics changes A maximum safe resection was not possible due to concerns of significant sequela. Second opinion was recommended at the [facility].   · 2019-he was  seen by Dr. Oral Jessica. Recommended concurrent chemoradiation.   · 5/24/2019-he was first seen by me. Recommended concurrent chemoradiation with Temodar.     REVIEW OF SYSTEMS:    Constitutional: no fever                      Lungs: normal respiratory effort     CVS: no palpitation, no chest pain  GI: no abdominal pain, no nausea , no vomiting  DIPAK: no dysuria, frequency and urgency, no hematuria  Musculoskeletal: no joint pain, swelling , stiffness  Endocrine: no polyuria, polydipsia  Hematology: Anemia, no easy brusing or bleeding  Dermatology: no skin rash, no eczema, no pruritus  Neurology: positive for anaplastic glioma, seizures      OBJECTIVE:    Vitals:    06/20/19 0500   BP: 146/81   Pulse: 88   Resp: 16   Temp: 98.4 °F (36.9 °C)   SpO2: 97%       Intake/Output Summary (Last 24 hours) at 6/20/2019 0646  Last data filed at 6/19/2019 1700  Gross per 24 hour   Intake 200 ml   Output --   Net 200 ml     PHYSICAL EXAM:    CONSTITUTIONAL: Feeling better, no seizure activity   NECK: Supple, no masses   CHEST/LUNGS: CTA bilaterally, normal respiratory effort   CARDIOVASCULAR: RRR, no murmurs  ABDOMEN: soft non-tender, active bowel sounds, no HSM  EXTREMITIES: BLE SCD's  SKIN: warm, dry with no rashes or lesions      CBC  Results from last 7 days   Lab Units 06/18/19  0330 06/17/19  0515 06/16/19  0148   WBC 10*3/mm3 9.12 15.58* 15.75*   HEMOGLOBIN g/dL 11.0* 11.4* 11.8*   HEMATOCRIT % 32.6* 33.9* 35.2*   PLATELETS 10*3/mm3 178 195 196         Lab Results   Component Value Date     06/18/2019    K 3.8 06/18/2019     (H) 06/18/2019    CO2 22.0 (L) 06/18/2019    BUN 20 06/18/2019    CREATININE 1.09 06/18/2019    GLUCOSE 102 (H) 06/18/2019    CALCIUM 8.3 (L) 06/18/2019    BILITOT 0.5 06/17/2019    ALKPHOS 57 06/17/2019    AST 46 (H) 06/17/2019    ALT 65 (H) 06/17/2019    AGRATIO 1.1 06/17/2019    GLOB 2.6 06/17/2019       Lab Results   Component Value Date    INR 0.98 06/03/2019    INR 0.94 03/22/2018     PROTIME 13.3 06/03/2019    PROTIME 12.8 03/22/2018       Cultures:    Lab Results   Component Value Date    BLOODCX No growth at 4 days 06/15/2019     No components found for: URINCX    CT HEAD WO CONTRAST-  6/14/2019 7:55 AM CDT     HISTORY: Seizures, history of brain tumor previous right-sided frontal  craniotomy.     COMPARISONS: 06/03/2019 head CT      TECHNIQUE:     Radiation dose equals  mGy-cm.  Automated exposure control dose  reduction technique was implemented.        CT evaluation of the head without intravenous contrast. 5 mm transaxial  images were obtained.   2-D sagittal and coronal reconstruction images  were generated.     FINDINGS: Examination was sent to statrad for preliminary  interpretation.     Changes from right craniotomy observed.     The right sided high density lesion is again observed superiorly in the  right frontal lobe, near the midline, extending from the corpus callosum  was noted previously and is essentially unchanged. Second probable high  density lesion observed more laterally in the right frontal lobe near  the craniotomy defect. The CT appearance is most likely unchanged.     There is no mass effect or midline shift.     There is no hydrocephalus.     There is no developing hemorrhage.     Mucosal thickening observed in the ethmoid sinuses.           IMPRESSION:  1. Stable CT appearance of the head compared to 06/03/2019, as described  above.                          MRI of brain on 6/17/2019    IMPRESSION:  1. Approximately 3 cm enhancing partially necrotic tumor immediately above  the right corpus callosum body with associated FLAIR signal immediately above it.  2. Faint enhancement and FLAIR signal extending to the right precentral gyrus, consistent with additional tumor extension.  This report was finalized on 06/17/2019 21:09 by Dr Eugene Thomas, .     ASSESSMENT/PLAN:  Anaplastic glioma  - He completed two (2) XRT treatment fractions and took Temodar for 2 days --  6/12/19 and 6/13/19.    - XRT resumed on 6/18/2019, fraction #5 planned for today   - Temodar resumed on 6/19/2019  - MRI of brain on 6/17/2019 documented stable tumor size with increased enhancement.      CBC on 6/18/2019  WBC 9.12, normalized  HGB 11.0  ,000     CBC in AM     Seizure activity, resolved  - Dr. Trevino assisting  - Internal medicine managing antiepileptic medications  · Vimpat  · Depakene  · Keppra  · Decadron now oral and tapering      No overt seizure activity on EEG on 6/16/2019.  No further seizure activity.  MRI of brain on 6/17/2019 documented stable tumor size with increased enhancement.  Strength improving right > left and participating with physical therapy without difficulty.  Resumed XRT on 6/18/2019 and Temodar on 6/19/2019.      EPI Ferguson    06/20/19  6:46 AM     I personally saw and examined this patient, performing a face-to-face diagnostic evaluation with plan of care reviewed and developed with  MICHELE Ferguson and nursing staff.   I have addended and/or modified the above history of present illness, physical examination, and assessment and plan to reflect my findings and impressions.   Essential elements of the care plan were discussed with  MICHELE Ferguson .   Agree with findings and assessment/plan as documented above.  Questions were encouraged, asked and answered to their understanding and satisfaction.    Delio Romero MD  6/20/2019 2:09 PM

## 2019-06-20 NOTE — THERAPY DISCHARGE NOTE
Acute Care - Occupational Therapy Discharge Summary  [facility]     Patient Name: Lukasz Savage  : 1965  MRN: [id number]    Today's Date: 2019  Onset of Illness/Injury or Date of Surgery: 19    Date of Referral to OT: 19  Referring Physician: Dr. Webb      Admit Date: 2019        OT Recommendation and Plan    Visit Dx:    ICD-10-CM ICD-9-CM   1. Status epilepticus (CMS/Formerly [facility]) G40.901 345.3   2. Astrocytoma brain tumor (CMS/Formerly [facility]) C71.9 191.9   3. Dysphagia, unspecified type R13.10 787.20   4. Impaired mobility Z74.09 799.89   5. Impaired mobility and ADLs Z74.09 799.89         Time Calculation- OT     Row Name 19 1225 19 1120          Time Calculation- OT    OT Start Time  1011  -TS  --     OT Stop Time  1021  -TS  --     OT Time Calculation (min)  10 min  -TS  --     Total Timed Code Minutes- OT  10 minute(s)  -TS  --     OT Received On  19  -TS  --        Timed Charges    30769 - Gait Training Minutes   --  15  -NW     05783 - OT Self Care/Mgmt Minutes  10  -TS  --       User Key  (r) = Recorded By, (t) = Taken By, (c) = Cosigned By    Initials Name Provider Type    TS Enma Ellis, AGUILAR/L Occupational Therapy Assistant    NW Dulce Maria Mendez, PTA Physical Therapy Assistant            Rehab Goal Summary     Row Name 19 1200             Bathing Goal 1 (OT)    Activity/Assistive Device (Bathing Goal 1, OT)  bathing skills, all  -TS      [place] Level/Cues Needed (Bathing Goal 1, OT)  supervision required;set-up required  -TS      Time Frame (Bathing Goal 1, OT)  10 days  -TS      Progress/Outcomes (Bathing Goal 1, OT)  goal not met  -TS         Dressing Goal 1 (OT)    Activity/Assistive Device (Dressing Goal 1, OT)  dressing skills, all  -TS      [place]/Cues Needed (Dressing Goal 1, OT)  supervision required;set-up required  -TS      Time Frame (Dressing Goal 1, OT)  10 days  -TS      Progress/Outcome (Dressing Goal 1, OT)  goal not met   -TS         Coordination Goal 1 (OT)    Activity/Assistive Device (Coordination Goal 1, OT)  FM written ex program;GM written ex program;FM task;GM task  -TS      [place] Level/Cues Needed (Coordination Goal 1, OT)  conditional independence;verbal cues required  -TS      Time Frame (Coordination Goal 1, OT)  10 days  -TS      Progress/Outcomes (Coordination Goal 1, OT)  goal met  -TS        User Key  (r) = Recorded By, (t) = Taken By, (c) = Cosigned By    Initials Name Provider Type Discipline    TS Enma Ellis COTA/L Occupational Therapy Assistant OT          Outcome Measures     Row Name 06/20/19 1200 06/20/19 1100 06/19/19 1213       How much help from another person do you currently need...    Turning from your back to your side while in flat bed without using bedrails?  --  4  -NW  --    Moving from lying on back to sitting on the side of a flat bed without bedrails?  --  4  -NW  --    Moving to and from a bed to a chair (including a wheelchair)?  --  4  -NW  --    Standing up from a chair using your arms (e.g., wheelchair, bedside chair)?  --  4  -NW  --    Climbing 3-5 steps with a railing?  --  4  -NW  --    To walk in hospital room?  --  4  -NW  --    AM-PAC 6 Clicks Score  --  24  -NW  --       How much help from another is currently needed...    Putting on and taking off regular lower body clothing?  4  -TS  --  2  -JJ    Bathing (including washing, rinsing, and drying)  4  -TS  --  2  -JJ    Toileting (which includes using toilet bed pan or urinal)  4  -TS  --  2  -JJ    Putting on and taking off regular upper body clothing  4  -TS  --  3  -JJ    Taking care of personal grooming (such as brushing teeth)  4  -TS  --  3  -JJ    Eating meals  4  -TS  --  3  -JJ    Score  24  -TS  --  15  -JJ       Functional Assessment    Outcome Measure Options  AM-PAC 6 Clicks Daily Activity (OT)  -TS  AM-PAC 6 Clicks Basic Mobility (PT)  -NW  AM-PAC 6 Clicks Daily Activity (OT)  -JJ      User Key  (r) =  Recorded By, (t) = Taken By, (c) = Cosigned By    Initials Name Provider Type    TS Enma Ellis AGUILAR/L Occupational Therapy Assistant    Dulce Maria Laird, NATALIE Physical Therapy Assistant    Sri Newby OTR/L Occupational Therapist          Therapy Suggested Charges     Code   Minutes Charges    40933 (CPT®) Hc Ot Neuromusc Re Education Ea 15 Min      32051 (CPT®) Hc Ot Ther Proc Ea 15 Min      46075 (CPT®) Hc Ot Therapeutic Act Ea 15 Min      80687 (CPT®) Hc Ot Manual Therapy Ea 15 Min      28633 (CPT®) Hc Ot Iontophoresis Ea 15 Min      71402 (CPT®) Hc Ot Elec Stim Ea-Per 15 Min      40669 (CPT®) Hc Ot Ultrasound Ea 15 Min      45166 (CPT®) Hc Ot Self Care/Mgmt/Train Ea 15 Min 10 1    Total  10 1          Therapy Charges for Today     Code Description Service Date Service Provider Modifiers Qty    79616295856 HC OT SELF CARE/MGMT/TRAIN EA 15 MIN 6/20/2019 Enma Ellis COTA/L GO 1          OT Discharge Summary  Reason for Discharge: Discharge from facility, At baseline function  Outcomes Achieved: Refer to plan of care for updates on goals achieved  Discharge Destination: Home with assist      JEFF Burrell/SRINIVAS  6/20/2019

## 2019-06-21 ENCOUNTER — READMISSION MANAGEMENT (OUTPATIENT)
Dept: CALL CENTER | Facility: HOSPITAL | Age: 54
End: 2019-06-21

## 2019-06-21 ENCOUNTER — APPOINTMENT (OUTPATIENT)
Dept: GENERAL RADIOLOGY | Facility: HOSPITAL | Age: 54
End: 2019-06-21

## 2019-06-21 ENCOUNTER — HOSPITAL ENCOUNTER (OUTPATIENT)
Dept: RADIATION ONCOLOGY | Facility: HOSPITAL | Age: 54
Setting detail: RADIATION/ONCOLOGY SERIES
End: 2019-06-21

## 2019-06-21 ENCOUNTER — APPOINTMENT (OUTPATIENT)
Dept: CT IMAGING | Facility: HOSPITAL | Age: 54
End: 2019-06-21

## 2019-06-21 ENCOUNTER — HOSPITAL ENCOUNTER (EMERGENCY)
Facility: HOSPITAL | Age: 54
Discharge: ANOTHER HOSPITAL | End: 2019-06-21
Attending: EMERGENCY MEDICINE | Admitting: EMERGENCY MEDICINE

## 2019-06-21 VITALS
RESPIRATION RATE: 17 BRPM | WEIGHT: 195 LBS | HEIGHT: 68 IN | DIASTOLIC BLOOD PRESSURE: 96 MMHG | BODY MASS INDEX: 29.55 KG/M2 | TEMPERATURE: 98.5 F | HEART RATE: 79 BPM | OXYGEN SATURATION: 99 % | SYSTOLIC BLOOD PRESSURE: 156 MMHG

## 2019-06-21 DIAGNOSIS — R56.9 FOCAL SEIZURE (HCC): Primary | ICD-10-CM

## 2019-06-21 LAB
ALBUMIN SERPL-MCNC: 3.3 G/DL (ref 3.5–5)
ALBUMIN/GLOB SERPL: 1.2 G/DL (ref 1.1–2.5)
ALP SERPL-CCNC: 59 U/L (ref 24–120)
ALT SERPL W P-5'-P-CCNC: 181 U/L (ref 0–54)
AMMONIA BLD-SCNC: 52 UMOL/L (ref 9–33)
AMPHET+METHAMPHET UR QL: POSITIVE
ANION GAP SERPL CALCULATED.3IONS-SCNC: 8 MMOL/L (ref 4–13)
AST SERPL-CCNC: 111 U/L (ref 7–45)
BARBITURATES UR QL SCN: NEGATIVE
BASOPHILS # BLD AUTO: 0.03 10*3/MM3 (ref 0–0.2)
BASOPHILS NFR BLD AUTO: 0.3 % (ref 0–2)
BENZODIAZ UR QL SCN: NEGATIVE
BILIRUB SERPL-MCNC: 0.2 MG/DL (ref 0.1–1)
BILIRUB UR QL STRIP: NEGATIVE
BUN BLD-MCNC: 23 MG/DL (ref 5–21)
BUN/CREAT SERPL: 23.5 (ref 7–25)
CALCIUM SPEC-SCNC: 9.2 MG/DL (ref 8.4–10.4)
CANNABINOIDS SERPL QL: NEGATIVE
CHLORIDE SERPL-SCNC: 105 MMOL/L (ref 98–110)
CLARITY UR: CLEAR
CO2 SERPL-SCNC: 28 MMOL/L (ref 24–31)
COCAINE UR QL: NEGATIVE
COLOR UR: YELLOW
CREAT BLD-MCNC: 0.98 MG/DL (ref 0.5–1.4)
D-LACTATE SERPL-SCNC: 1.4 MMOL/L (ref 0.5–2)
DEPRECATED RDW RBC AUTO: 47.6 FL (ref 40–54)
EOSINOPHIL # BLD AUTO: 0.02 10*3/MM3 (ref 0–0.7)
EOSINOPHIL NFR BLD AUTO: 0.2 % (ref 0–4)
ERYTHROCYTE [DISTWIDTH] IN BLOOD BY AUTOMATED COUNT: 15.4 % (ref 12–15)
ETHANOL UR QL: <0.01 %
GFR SERPL CREATININE-BSD FRML MDRD: 80 ML/MIN/1.73
GLOBULIN UR ELPH-MCNC: 2.8 GM/DL
GLUCOSE BLD-MCNC: 76 MG/DL (ref 70–100)
GLUCOSE UR STRIP-MCNC: NEGATIVE MG/DL
HCT VFR BLD AUTO: 34.1 % (ref 40–52)
HGB BLD-MCNC: 11.7 G/DL (ref 14–18)
HGB UR QL STRIP.AUTO: NEGATIVE
HOLD SPECIMEN: NORMAL
HOLD SPECIMEN: NORMAL
IMM GRANULOCYTES # BLD AUTO: 0.19 10*3/MM3 (ref 0–0.05)
IMM GRANULOCYTES NFR BLD AUTO: 1.7 % (ref 0–5)
INR PPP: 0.94 (ref 0.91–1.09)
KETONES UR QL STRIP: NEGATIVE
LEUKOCYTE ESTERASE UR QL STRIP.AUTO: NEGATIVE
LIPASE SERPL-CCNC: 70 U/L (ref 23–203)
LYMPHOCYTES # BLD AUTO: 2.84 10*3/MM3 (ref 0.72–4.86)
LYMPHOCYTES NFR BLD AUTO: 26 % (ref 15–45)
MAGNESIUM SERPL-MCNC: 1.8 MG/DL (ref 1.4–2.2)
MCH RBC QN AUTO: 29.3 PG (ref 28–32)
MCHC RBC AUTO-ENTMCNC: 34.3 G/DL (ref 33–36)
MCV RBC AUTO: 85.3 FL (ref 82–95)
METHADONE UR QL SCN: NEGATIVE
MONOCYTES # BLD AUTO: 1.25 10*3/MM3 (ref 0.19–1.3)
MONOCYTES NFR BLD AUTO: 11.5 % (ref 4–12)
NEUTROPHILS # BLD AUTO: 6.58 10*3/MM3 (ref 1.87–8.4)
NEUTROPHILS NFR BLD AUTO: 60.3 % (ref 39–78)
NITRITE UR QL STRIP: NEGATIVE
NRBC BLD AUTO-RTO: 0 /100 WBC (ref 0–0.2)
OPIATES UR QL: NEGATIVE
PCP UR QL SCN: NEGATIVE
PH UR STRIP.AUTO: 7 [PH] (ref 5–8)
PHOSPHATE SERPL-MCNC: 3.9 MG/DL (ref 2.5–4.5)
PLATELET # BLD AUTO: 279 10*3/MM3 (ref 130–400)
PMV BLD AUTO: 9.9 FL (ref 6–12)
POTASSIUM BLD-SCNC: 3.9 MMOL/L (ref 3.5–5.3)
PROT SERPL-MCNC: 6.1 G/DL (ref 6.3–8.7)
PROT UR QL STRIP: NEGATIVE
PROTHROMBIN TIME: 12.8 SECONDS (ref 11.9–14.6)
RBC # BLD AUTO: 4 10*6/MM3 (ref 4.8–5.9)
SODIUM BLD-SCNC: 141 MMOL/L (ref 135–145)
SP GR UR STRIP: 1.03 (ref 1–1.03)
UROBILINOGEN UR QL STRIP: NORMAL
VALPROATE SERPL-MCNC: 102 MCG/ML (ref 50–100)
WBC NRBC COR # BLD: 10.91 10*3/MM3 (ref 4.8–10.8)
WHOLE BLOOD HOLD SPECIMEN: NORMAL
WHOLE BLOOD HOLD SPECIMEN: NORMAL

## 2019-06-21 PROCEDURE — 70450 CT HEAD/BRAIN W/O DYE: CPT

## 2019-06-21 PROCEDURE — 82140 ASSAY OF AMMONIA: CPT | Performed by: EMERGENCY MEDICINE

## 2019-06-21 PROCEDURE — 93010 ELECTROCARDIOGRAM REPORT: CPT | Performed by: INTERNAL MEDICINE

## 2019-06-21 PROCEDURE — 99285 EMERGENCY DEPT VISIT HI MDM: CPT

## 2019-06-21 PROCEDURE — 25010000003 LEVETIRACETAM IN NACL 0.54% 1500 MG/100ML SOLUTION: Performed by: EMERGENCY MEDICINE

## 2019-06-21 PROCEDURE — 99223 1ST HOSP IP/OBS HIGH 75: CPT | Performed by: HOSPITALIST

## 2019-06-21 PROCEDURE — 80307 DRUG TEST PRSMV CHEM ANLYZR: CPT | Performed by: EMERGENCY MEDICINE

## 2019-06-21 PROCEDURE — 96367 TX/PROPH/DG ADDL SEQ IV INF: CPT

## 2019-06-21 PROCEDURE — 96375 TX/PRO/DX INJ NEW DRUG ADDON: CPT

## 2019-06-21 PROCEDURE — 83735 ASSAY OF MAGNESIUM: CPT | Performed by: EMERGENCY MEDICINE

## 2019-06-21 PROCEDURE — 83605 ASSAY OF LACTIC ACID: CPT | Performed by: EMERGENCY MEDICINE

## 2019-06-21 PROCEDURE — 81003 URINALYSIS AUTO W/O SCOPE: CPT | Performed by: EMERGENCY MEDICINE

## 2019-06-21 PROCEDURE — 83690 ASSAY OF LIPASE: CPT | Performed by: EMERGENCY MEDICINE

## 2019-06-21 PROCEDURE — 80164 ASSAY DIPROPYLACETIC ACD TOT: CPT | Performed by: EMERGENCY MEDICINE

## 2019-06-21 PROCEDURE — 84100 ASSAY OF PHOSPHORUS: CPT | Performed by: EMERGENCY MEDICINE

## 2019-06-21 PROCEDURE — 77412 RADIATION TX DELIVERY LVL 3: CPT | Performed by: RADIOLOGY

## 2019-06-21 PROCEDURE — 96365 THER/PROPH/DIAG IV INF INIT: CPT

## 2019-06-21 PROCEDURE — 85610 PROTHROMBIN TIME: CPT | Performed by: EMERGENCY MEDICINE

## 2019-06-21 PROCEDURE — 71045 X-RAY EXAM CHEST 1 VIEW: CPT

## 2019-06-21 PROCEDURE — 80177 DRUG SCRN QUAN LEVETIRACETAM: CPT | Performed by: EMERGENCY MEDICINE

## 2019-06-21 PROCEDURE — 80053 COMPREHEN METABOLIC PANEL: CPT | Performed by: EMERGENCY MEDICINE

## 2019-06-21 PROCEDURE — 93005 ELECTROCARDIOGRAM TRACING: CPT | Performed by: EMERGENCY MEDICINE

## 2019-06-21 PROCEDURE — 25010000002 LORAZEPAM PER 2 MG: Performed by: EMERGENCY MEDICINE

## 2019-06-21 PROCEDURE — 85025 COMPLETE CBC W/AUTO DIFF WBC: CPT | Performed by: EMERGENCY MEDICINE

## 2019-06-21 RX ORDER — LEVETIRACETAM 15 MG/ML
1500 INJECTION INTRAVASCULAR ONCE
Status: COMPLETED | OUTPATIENT
Start: 2019-06-21 | End: 2019-06-21

## 2019-06-21 RX ORDER — LORAZEPAM 2 MG/ML
0.5 INJECTION INTRAMUSCULAR ONCE
Status: COMPLETED | OUTPATIENT
Start: 2019-06-21 | End: 2019-06-21

## 2019-06-21 RX ADMIN — SODIUM CHLORIDE 1000 ML: 9 INJECTION, SOLUTION INTRAVENOUS at 19:36

## 2019-06-21 RX ADMIN — LEVETIRACETAM 1500 MG: 15 INJECTION INTRAVENOUS at 19:38

## 2019-06-21 RX ADMIN — SODIUM CHLORIDE 200 MG: 9 INJECTION, SOLUTION INTRAVENOUS at 20:06

## 2019-06-21 RX ADMIN — LORAZEPAM 0.5 MG: 2 INJECTION INTRAMUSCULAR; INTRAVENOUS at 19:34

## 2019-06-21 NOTE — THERAPY DISCHARGE NOTE
Acute Care - Physical Therapy Discharge Summary  [facility]       Patient Name: Lukasz Savage  : 1965  MRN: [id number]    Today's Date: 2019  Onset of Illness/Injury or Date of Surgery: 19    Date of Referral to PT: 19  Referring Physician: Dr. Webb      Admit Date: 2019      PT Recommendation and Plan    Visit Dx:    ICD-10-CM ICD-9-CM   1. Status epilepticus (CMS/HCC) G40.901 345.3   2. Astrocytoma brain tumor (CMS/Formerly [facility]) C71.9 191.9   3. Dysphagia, unspecified type R13.10 787.20   4. Impaired mobility Z74.09 799.89   5. Impaired mobility and ADLs Z74.09 799.89       Outcome Measures     Row Name 19 1200 19 1100 19 1213       How much help from another person do you currently need...    Turning from your back to your side while in flat bed without using bedrails?  --  4  -NW  --    Moving from lying on back to sitting on the side of a flat bed without bedrails?  --  4  -NW  --    Moving to and from a bed to a chair (including a wheelchair)?  --  4  -NW  --    Standing up from a chair using your arms (e.g., wheelchair, bedside chair)?  --  4  -NW  --    Climbing 3-5 steps with a railing?  --  4  -NW  --    To walk in hospital room?  --  4  -NW  --    AM-PAC 6 Clicks Score  --  24  -NW  --       How much help from another is currently needed...    Putting on and taking off regular lower body clothing?  4  -TS  --  2  -JJ    Bathing (including washing, rinsing, and drying)  4  -TS  --  2  -JJ    Toileting (which includes using toilet bed pan or urinal)  4  -TS  --  2  -JJ    Putting on and taking off regular upper body clothing  4  -TS  --  3  -JJ    Taking care of personal grooming (such as brushing teeth)  4  -TS  --  3  -JJ    Eating meals  4  -TS  --  3  -JJ    Score  24  -TS  --  15  -JJ       Functional Assessment    Outcome Measure Options  AM-PAC 6 Clicks Daily Activity (OT)  -TS  AM-PAC 6 Clicks Basic Mobility (PT)  -NW  AM-PAC 6 Clicks Daily Activity  (OT)  -BERNARDO      User Key  (r) = Recorded By, (t) = Taken By, (c) = Cosigned By    Initials Name Provider Type    Enma Richards, AGUILAR/L Occupational Therapy Assistant    Dulce Maria Laird PTA Physical Therapy Assistant    Sri Newby OTR/L Occupational Therapist              Rehab Goal Summary     Row Name 06/21/19 1157             Transfer Goal 1 (PT)    Activity/Assistive Device (Transfer Goal 1, PT)  sit-to-stand/stand-to-sit;bed-to-chair/chair-to-bed  -NW      [place] Level/Cues Needed (Transfer Goal 1, PT)  standby assist  -NW      Progress/Outcome (Transfer Goal 1, PT)  goal met  -NW         Gait Training Goal 1 (PT)    Activity/Assistive Device (Gait Training Goal 1, PT)  gait (walking locomotion);decrease fall risk;improve balance and speed;increase endurance/gait distance  -NW      [place] Level (Gait Training Goal 1, PT)  contact guard assist  -NW      Distance (Gait Goal 1, PT)  250 ft  -NW      Progress/Outcome (Gait Training Goal 1, PT)  goal met  -NW         Stairs Goal 1 (PT)    Activity/Assistive Device (Stairs Goal 1, PT)  ascending stairs;descending stairs  -NW      [place] Level/Cues Needed (Stairs Goal 1, PT)  contact guard assist  -NW      Number of Stairs (Stairs Goal 1, PT)  5  -NW      Progress/Outcome (Stairs Goal 1, PT)  goal met  -NW        User Key  (r) = Recorded By, (t) = Taken By, (c) = Cosigned By    Initials Name Provider Type Discipline    NW Dulce Maria Mendez PTA Physical Therapy Assistant PT          Therapy Charges for Today     Code Description Service Date Service Provider Modifiers Qty    92710140048 HC PT THER PROC EA 15 MIN 6/20/2019 Dulce Maria Mendez PTA GP 1    74517490005 HC GAIT TRAINING EA 15 MIN 6/20/2019 Dulce Maria Mendez PTA GP 1          PT Discharge Summary  Anticipated Discharge Disposition (PT): home  Reason for Discharge: Discharge from facility  Outcomes Achieved: Refer to plan of care for updates on goals  achieved  Discharge Destination: Home with home health      Dulce Maria Mendez, PTA   6/21/2019

## 2019-06-21 NOTE — PAYOR COMM NOTE
"DC HOME 6-20-19  [id number]    Lukasz Wahl Mary (54 y.o. Male)     Date of Birth Social Security Number Address Home Phone MRN    1965  [address] [facility]  [place] [address] [id number]    [employer] Status          [facility]       Admission Date Admission Type Admitting Provider Attending Provider Department, Room/Bed    6/14/19 Emergency Jairon Webb MD  [facility] 3A, 349/1    Discharge Date Discharge Disposition Discharge Destination        6/20/2019 Home or Self Care              Attending Provider:  (none)   Allergies:  Lipitor [Atorvastatin], Lisinopril    Isolation:  None   Infection:  C.difficile (07/07/17)   Code Status:  Prior    Ht:  172.7 cm (68\")   Wt:  88.5 kg (195 lb 2.1 oz)    Admission Cmt:  None   Principal Problem:  None                Active Insurance as of 6/14/2019     Primary Coverage     Payor Plan Insurance Group Employer/Plan Group    [facility] [employer] [facility] [place] [facility]      Payor Plan Address Payor Plan Phone Number Payor Plan Fax Number Effective Dates    [address] [address]   7/1/2017 - None Entered    [place] [address]       Subscriber Name Subscriber Birth Date Member ID       LUKASZ WAHL MARY 1965 [id number]                 Emergency Contacts      (Rel.) Home Phone Work Phone Mobile Phone    Ivonne Wahl (Mother) [phone] -- --    JakiYvette (Daughter) [phone] -- [phone]               Discharge Summary      Jairon Webb MD at 6/20/2019  2:03 PM                [facility] Medicine Services  DISCHARGE SUMMARY       Date of Admission: 6/14/2019  Date of Discharge:  6/20/2019  Primary Care Physician: Linda Hoffman, DNP, APRN    Presenting Problem/History of Present Illness:  Seizures    Final Discharge Diagnoses:  Active Hospital Problems    Diagnosis   • Anemia   • Leukocytosis   • Status epilepticus (CMS/HCC)   • " Hyperkalemia   • Acute kidney injury (CMS/HCC) on chronic kidney disease stage 3   • Astrocytoma brain tumor (CMS/HCC)   • Seizures (CMS/HCC)       Consults:   Neurosurgery  Neurology  Oncology      Procedures Performed: Intubation    Pertinent Test Results:   Imaging Results (last 7 days)     Procedure Component Value Units Date/Time    MRI Brain With & Without Contrast [[id number]] Collected:  06/17/19 1842     Updated:  06/17/19 2113    Narrative:          History:  54-year-old with seizures. Anaplastic astrocytoma.     Reference   Brain MRI June 2019.     Technique  Routine pre and postcontrast enhanced brain MRI.     Findings  There is no diffusion abnormality.     3.7 cm of FLAIR hyperintensity involving the inferior/posterior right  frontal lobe with involvement of the cortex is unchanged. This is  immediately cephalad to the right lateral ventricular body. There is 3  cm necrotic enhancing soft tissue immediately below this area of FLAIR  signal causing mass effect but not clearly invading the right corpus  callosum body (see sagittal postcontrast images). This is all consistent  with patient's known astrocytoma. There is additional FLAIR signal on  both sides of the right central sulcus with faint contrast enhancement  in the precentral gyrus. This is compatible with tumor extension. No  tumor is visible in the left cerebral hemisphere or posterior fossa.  There is no brain herniation. No hemorrhage is seen. Dural venous  sinuses are patent. Ventricular system maintains normal caliber.          Impression:       Approximately 3 cm enhancing partially necrotic tumor immediately above  the right corpus callosum body. Associated FLAIR signal is noted  immediately above it. Faint enhancement and FLAIR signal extending to  the right precentral gyrus, consistent with additional tumor extension.  This report was finalized on 06/17/2019 21:09 by Dr Eugene Thomas, .    XR Chest 1 View [[id number]] Collected:  06/16/19  0759     Updated:  06/16/19 0803    Narrative:       Frontal upright radiograph of the chest 6/16/2019 3:49 AM CDT     COMPARISON: 06/15/2019.     FINDINGS:   The lungs are clear. The cardiac silhouettes upper limits of normal.  Nasogastric tube and endotracheal tube are satisfactorily position.      The osseous structures and surrounding soft tissues demonstrate no acute  abnormality.       Impression:       1. No radiographic evidence of acute cardiopulmonary process.        This report was finalized on 06/16/2019 08:00 by Dr. Adolph Echols MD.    XR Chest 1 View [[id number]] Collected:  06/15/19 0810     Updated:  06/15/19 0813    Narrative:       Frontal upright radiograph of the chest 6/15/2019 3:45 AM CDT     COMPARISON: 06/14/2019.     HISTORY: Patient intubated.     FINDINGS:   The lungs are clear. The cardiac silhouette is normal. Endotracheal tube  and nasogastric tube are satisfactorily position.      The osseous structures and surrounding soft tissues demonstrate no acute  abnormality.       Impression:       1. No radiographic evidence of acute cardiopulmonary process.        This report was finalized on 06/15/2019 08:10 by Dr. Adolph Echols MD.    XR Abdomen KUB [[id number]] Collected:  06/14/19 1313     Updated:  06/14/19 1317    Narrative:       XR ABDOMEN KUB- 6/14/2019 12:25 PM CDT     HISTORY: New OG needing to give meds via this route; G40.901-Epilepsy,  unspecified, not intractable, with status epilepticus; C71.9-Malignant  neoplasm of brain, unspecified       COMPARISON: 07/14/2017     FINDINGS:  There is a nonspecific bowel gas pattern. Nasogastric tube is present  the distal tip satisfactorily positioned in the region of the body the  stomach. Endotracheal tube is present just proximal to the jolly.     No acute skeletal abnormality is identified.        Impression:       1. Nasogastric tube satisfactorily position distal tip region of the  body the stomach.         This report was finalized  on 06/14/2019 13:14 by Dr. Adolph Echols MD.    CT Cervical Spine Without Contrast [[id number]] Collected:  06/14/19 0809     Updated:  06/14/19 0814    Narrative:       EXAMINATION:  CT CERVICAL SPINE WO CONTRAST-  6/14/2019 4:46 AM CDT     HISTORY: fall, seizure      COMPARISON: No comparison study.     TECHNIQUE: Radiation dose equals  mGy-cm.  Automated exposure  control dose reduction technique was implemented.     Thin section axial imaging was obtained without intravenous contrast.  2-D sagittal and coronal reconstruction images were generated.     FINDINGS: Examination was sent to statrad for preliminary  interpretation.     The facets are appropriately aligned.     The vertebral bodies are maintained. Prevertebral soft tissues are  normal without acute fracture or subluxation.     There is multilevel disc degeneration with disc space narrowing endplate  irregularities and osteophyte formation C6-C7, C5-C6, C4-C5 and to a  lesser degree C3-C4.     There is mild posterior listhesis CT for on C5 and mild anterolisthesis  of C3 on C4.     There is relative canal foraminal narrowing at multiple levels  particularly at C5-C6.     There is no CT evidence of posttraumatic disc herniation.       Impression:       1. No CT evidence of acute bony injury to the cervical spine.  2. Multilevel disc degeneration spondylosis.  This report was finalized on 06/14/2019 08:11 by Dr. Pierre Pan MD.    CT Head Without Contrast [[id number]] Collected:  06/14/19 0755     Updated:  06/14/19 0812    Narrative:       EXAMINATION: CT HEAD WO CONTRAST-  6/14/2019 7:55 AM CDT     CT SCAN OF THE HEAD, WITHOUT CONTRAST:      HISTORY: Seizures, history of brain tumor previous right-sided frontal  craniotomy.     COMPARISONS: 06/03/2019 head CT      TECHNIQUE:     Radiation dose equals  mGy-cm.  Automated exposure control dose  reduction technique was implemented.        CT evaluation of the head without intravenous  contrast. 5 mm transaxial  images were obtained.   2-D sagittal and coronal reconstruction images  were generated.     FINDINGS: Examination was sent to statrad for preliminary  interpretation.     Changes from right craniotomy observed.     The right sided high density lesion is again observed superiorly in the  right frontal lobe, near the midline, extending from the corpus callosum  was noted previously and is essentially unchanged. Second probable high  density lesion observed more laterally in the right frontal lobe near  the craniotomy defect. The CT appearance is most likely unchanged.     There is no mass effect or midline shift.     There is no hydrocephalus.     There is no developing hemorrhage.     Mucosal thickening observed in the ethmoid sinuses.             Impression:       1. Stable CT appearance of the head compared to 06/03/2019, as described  above.                              This report was finalized on 06/14/2019 08:08 by Dr. Pierre Pan MD.    XR Chest 1 View [[id number]] Collected:  06/14/19 0705     Updated:  06/14/19 0709    Narrative:       EXAM: XR CHEST 1 VW- - 6/14/2019 4:15 AM CDT     HISTORY: post intubation       COMPARISON: 07/15/2017.      TECHNIQUE:  1 images.  Frontal view of the chest.     FINDINGS:    Endotracheal tube tip projects 1.5 cm from the jolly. No pneumothorax  or large pleural effusion. No dense focal consolidation. Prominent  cardiac silhouette. Upper mediastinum within normal limits. No acute or  suspicious bony findings. Probable old right clavicle fracture.          Impression:       1. Endotracheal tube tip projects 1.5 cm above the jolly.  2. No focal consolidation.  This report was finalized on 06/14/2019 07:06 by Dr Sindy Mota MD.        Lab Results (last 7 days)     Procedure Component Value Units Date/Time    Valproic Acid Level, Total [[id number]]  (Abnormal) Collected:  06/20/19 0630    Specimen:  Blood Updated:  06/20/19 0718     Valproic Acid  44.5 mcg/mL     Blood Culture - Blood, Arm, Left [[id number]] Collected:  06/15/19 2206    Specimen:  Blood from Arm, Left Updated:  06/19/19 2230     Blood Culture No growth at 4 days    Respiratory Culture - Sputum, ET Suction [[id number]] Collected:  06/16/19 1406    Specimen:  Sputum from ET Suction Updated:  06/19/19 0805     Respiratory Culture Moderate growth (3+) Normal Respiratory Evelio     Gram Stain Less than 25 WBCs per low power field      Few (2+) Epithelial cells per low power field      Few (2+) Mixed gram positive evelio      Few (2+) Gram negative bacilli    Levetiracetam Level (Keppra) [[id number]]  (Abnormal) Collected:  06/14/19 0724    Specimen:  Blood Updated:  06/19/19 0716     Levetiracetam 45.2 ug/mL      Comment: This test was developed and its performance characteristics  determined by [facility]. It has not been cleared or approved  by the Food and Drug Administration.       Narrative:       Performed at:  [address] [place] [address]  [id number]  : Yuko Sheridan MD, Phone:  [phone]    Valproic Acid Level, Total [[id number]]  (Normal) Collected:  06/19/19 0606    Specimen:  Blood Updated:  06/19/19 0702     Valproic Acid 51.4 mcg/mL     Basic Metabolic Panel [[id number]]  (Abnormal) Collected:  06/18/19 0330    Specimen:  Blood Updated:  06/18/19 0447     Glucose 102 mg/dL      BUN 20 mg/dL      Creatinine 1.09 mg/dL      Sodium 141 mmol/L      Potassium 3.8 mmol/L      Chloride 112 mmol/L      CO2 22.0 mmol/L      Calcium 8.3 mg/dL      eGFR Non African Amer 71 mL/min/1.73      BUN/Creatinine Ratio 18.3     Anion Gap 7.0 mmol/L     Narrative:       GFR Normal >60  Chronic Kidney Disease <60  Kidney Failure <15    Phosphorus [[id number]]  (Normal) Collected:  06/18/19 0330    Specimen:  Blood Updated:  06/18/19 0447     Phosphorus 3.4 mg/dL     Magnesium [[id number]]  (Normal) Collected:  06/18/19 0330    Specimen:  Blood Updated:  06/18/19 0447      Magnesium 1.8 mg/dL     Valproic Acid Level, Total [[id number]]  (Abnormal) Collected:  06/18/19 0330    Specimen:  Blood Updated:  06/18/19 0441     Valproic Acid 32.4 mcg/mL     CBC (No Diff) [[id number]]  (Abnormal) Collected:  06/18/19 0330    Specimen:  Blood Updated:  06/18/19 0414     WBC 9.12 10*3/mm3      RBC 3.84 10*6/mm3      Hemoglobin 11.0 g/dL      Hematocrit 32.6 %      MCV 84.9 fL      MCH 28.6 pg      MCHC 33.7 g/dL      RDW 15.3 %      RDW-SD 47.1 fl      MPV 11.3 fL      Platelets 178 10*3/mm3     Comprehensive Metabolic Panel [[id number]]  (Abnormal) Collected:  06/17/19 0515    Specimen:  Blood Updated:  06/17/19 0607     Glucose 86 mg/dL      BUN 15 mg/dL      Creatinine 1.08 mg/dL      Sodium 140 mmol/L      Potassium 3.6 mmol/L      Chloride 111 mmol/L      CO2 24.0 mmol/L      Calcium 8.4 mg/dL      Total Protein 5.5 g/dL      Albumin 2.90 g/dL      ALT (SGPT) 65 U/L      AST (SGOT) 46 U/L      Alkaline Phosphatase 57 U/L      Total Bilirubin 0.5 mg/dL      eGFR Non African Amer 72 mL/min/1.73      Globulin 2.6 gm/dL      A/G Ratio 1.1 g/dL      BUN/Creatinine Ratio 13.9     Anion Gap 5.0 mmol/L     Narrative:       GFR Normal >60  Chronic Kidney Disease <60  Kidney Failure <15    Valproic Acid Level, Total [[id number]]  (Abnormal) Collected:  06/17/19 0515    Specimen:  Blood Updated:  06/17/19 0559     Valproic Acid 39.5 mcg/mL     CBC & Differential [[id number]] Collected:  06/17/19 0515    Specimen:  Blood Updated:  06/17/19 0536    Narrative:       The following orders were created for panel order CBC & Differential.  Procedure                               Abnormality         Status                     ---------                               -----------         ------                     CBC Auto Differential[[id number]]        Abnormal            Final result                 Please view results for these tests on the individual orders.    CBC Auto Differential [[id number]]  (Abnormal)  Collected:  06/17/19 0515    Specimen:  Blood Updated:  06/17/19 0536     WBC 15.58 10*3/mm3      RBC 3.98 10*6/mm3      Hemoglobin 11.4 g/dL      Hematocrit 33.9 %      MCV 85.2 fL      MCH 28.6 pg      MCHC 33.6 g/dL      RDW 15.1 %      RDW-SD 47.3 fl      MPV 11.4 fL      Platelets 195 10*3/mm3      Neutrophil % 83.4 %      Lymphocyte % 11.0 %      Monocyte % 4.8 %      Eosinophil % 0.1 %      Basophil % 0.1 %      Immature Grans % 0.6 %      Neutrophils, Absolute 13.00 10*3/mm3      Lymphocytes, Absolute 1.71 10*3/mm3      Monocytes, Absolute 0.75 10*3/mm3      Eosinophils, Absolute 0.01 10*3/mm3      Basophils, Absolute 0.01 10*3/mm3      Immature Grans, Absolute 0.10 10*3/mm3      nRBC 0.0 /100 WBC     Procalcitonin [[id number]]  (Normal) Collected:  06/16/19 0148    Specimen:  Blood Updated:  06/16/19 0839     Procalcitonin <0.25 ng/mL     Narrative:       SIRS, sepsis, severe sepsis, and septic shock are categorized according to the criteria of the consensus conference of the American College of Chest Physicians/Society of Critical Care Medicine.    PCT < 0.5 ng/mL     Systemic infection (sepsis) is not likely.    PCT >0.5 and < 2.0 ng/mL Systemic infection (sepsis) is possible, but other conditions are known to elevate PCT as well.    PCT > 2.0 ng/mL     Systemic infection (sepsis) is likely, unless other causes are known.      PCT > 10.0 ng/mL    Important systemic inflammatory response, almost exclusively due to severe bacterial sepsis or septic shock.    PCT values of < 0.5 ng/mL do not exclude an infection, because localized infections (without systemic signs) may be associated with such low concentrations, or a systemic infection in its initial stages (<6 hours).  Increased PCT can occur without infection.  PCT concentrations between 0.5 and 2.0 ng/mL should be interpreted taking into account the patients history.  It is recommended to retest PCT within 6-24 hours if any concentrations < 2.0 ng/mL are  obtained.    Basic Metabolic Panel [[id number]]  (Abnormal) Collected:  06/16/19 0148    Specimen:  Blood Updated:  06/16/19 0256     Glucose 101 mg/dL      BUN 13 mg/dL      Creatinine 1.07 mg/dL      Sodium 141 mmol/L      Potassium 3.3 mmol/L      Chloride 114 mmol/L      CO2 18.0 mmol/L      Calcium 8.1 mg/dL      eGFR Non African Amer 72 mL/min/1.73      BUN/Creatinine Ratio 12.1     Anion Gap 9.0 mmol/L     Narrative:       GFR Normal >60  Chronic Kidney Disease <60  Kidney Failure <15    Valproic Acid Level, Total [[id number]]  (Normal) Collected:  06/16/19 0148    Specimen:  Blood Updated:  06/16/19 0250     Valproic Acid 80.4 mcg/mL     CBC (No Diff) [[id number]]  (Abnormal) Collected:  06/16/19 0148    Specimen:  Blood Updated:  06/16/19 0237     WBC 15.75 10*3/mm3      RBC 4.17 10*6/mm3      Hemoglobin 11.8 g/dL      Hematocrit 35.2 %      MCV 84.4 fL      MCH 28.3 pg      MCHC 33.5 g/dL      RDW 15.6 %      RDW-SD 46.6 fl      MPV 11.1 fL      Platelets 196 10*3/mm3     Urinalysis, Microscopic Only - Urine, Catheter [[id number]]  (Abnormal) Collected:  06/16/19 0008    Specimen:  Urine, Catheter Updated:  06/16/19 0019     RBC, UA Too Numerous to Count /HPF      WBC, UA 0-2 /HPF      Bacteria, UA None Seen /HPF      Squamous Epithelial Cells, UA 0-2 /HPF      Hyaline Casts, UA None Seen /LPF      Methodology Automated Microscopy    Urinalysis With Culture If Indicated - Urine, Catheter [[id number]]  (Abnormal) Collected:  06/16/19 0008    Specimen:  Urine, Catheter Updated:  06/16/19 0019     Color, UA Yellow     Appearance, UA Clear     pH, UA 6.0     Specific Gravity, UA 1.017     Glucose, UA Negative     Ketones, UA Negative     Bilirubin, UA Negative     Blood, UA Large (3+)     Protein, UA Negative     Leuk Esterase, UA Negative     Nitrite, UA Negative     Urobilinogen, UA 1.0 E.U./dL    Blood Gas, Arterial [[id number]]  (Abnormal) Collected:  06/15/19 2325    Specimen:  Arterial Blood Updated:  06/15/19  2329     Site Right Radial     Gera's Test Positive     pH, Arterial 7.488 pH units      Comment: 83 Value above reference range        pCO2, Arterial 24.5 mm Hg      Comment: 84 Value below reference range        pO2, Arterial 121.0 mm Hg      Comment: 83 Value above reference range        HCO3, Arterial 18.6 mmol/L      Comment: 84 Value below reference range        Base Excess, Arterial -3.2 mmol/L      Comment: 84 Value below reference range        O2 Saturation, Arterial 98.8 %      Temperature 37.0 C      Barometric Pressure for Blood Gas 747 mmHg      Modality Ventilator     FIO2 30 %      Ventilator Mode AC     Set Tidal Volume 540     Set Mech Resp Rate 12.0     PEEP 5.0     Collected by [id number]     Comment: Meter: [id number]     :  [id number]       Lactic Acid, Plasma [[id number]]  (Normal) Collected:  06/15/19 2206    Specimen:  Blood Updated:  06/15/19 2229     Lactate 0.7 mmol/L     POC Glucose Once [[id number]]  (Normal) Collected:  06/15/19 0359    Specimen:  Blood Updated:  06/15/19 0418     Glucose 80 mg/dL      Comment: : [id number] Damon (Scott) DanaMeter ID: [id number]       Blood Gas, Arterial [[id number]]  (Abnormal) Collected:  06/15/19 0350    Specimen:  Arterial Blood Updated:  06/15/19 0401     Site Right Radial     Gera's Test Positive     pH, Arterial 7.482 pH units      Comment: 83 Value above reference range        pCO2, Arterial 26.3 mm Hg      Comment: 84 Value below reference range        pO2, Arterial 112.0 mm Hg      Comment: 83 Value above reference range        HCO3, Arterial 19.7 mmol/L      Comment: 84 Value below reference range        Base Excess, Arterial -2.5 mmol/L      Comment: 84 Value below reference range        O2 Saturation, Arterial 98.5 %      Temperature 37.0 C      Barometric Pressure for Blood Gas 749 mmHg      Modality Ventilator     FIO2 30 %      Ventilator Mode AC     Set Tidal Volume 540     Set Mech Resp Rate 12.0     PEEP 5.0      Collected by [id number]     Comment: Meter: [id number]     :  [id number]       Valproic Acid Level, Total [[id number]]  (Normal) Collected:  06/15/19 0228    Specimen:  Blood Updated:  06/15/19 0319     Valproic Acid 61.5 mcg/mL     Comprehensive Metabolic Panel [[id number]]  (Abnormal) Collected:  06/15/19 0228    Specimen:  Blood Updated:  06/15/19 0314     Glucose 68 mg/dL      BUN 17 mg/dL      Creatinine 1.21 mg/dL      Sodium 143 mmol/L      Potassium 3.9 mmol/L      Chloride 115 mmol/L      CO2 20.0 mmol/L      Calcium 8.2 mg/dL      Total Protein 5.1 g/dL      Albumin 2.70 g/dL      ALT (SGPT) 48 U/L      AST (SGOT) 50 U/L      Alkaline Phosphatase 50 U/L      Total Bilirubin 0.7 mg/dL      eGFR Non African Amer 63 mL/min/1.73      Globulin 2.4 gm/dL      A/G Ratio 1.1 g/dL      BUN/Creatinine Ratio 14.0     Anion Gap 8.0 mmol/L     Narrative:       GFR Normal >60  Chronic Kidney Disease <60  Kidney Failure <15    CBC Auto Differential [[id number]]  (Abnormal) Collected:  06/15/19 0228    Specimen:  Blood Updated:  06/15/19 0301     WBC 13.92 10*3/mm3      RBC 4.13 10*6/mm3      Hemoglobin 11.9 g/dL      Hematocrit 35.2 %      MCV 85.2 fL      MCH 28.8 pg      MCHC 33.8 g/dL      RDW 15.7 %      RDW-SD 48.2 fl      MPV 10.9 fL      Platelets 185 10*3/mm3      Neutrophil % 67.2 %      Lymphocyte % 21.8 %      Monocyte % 8.9 %      Eosinophil % 1.2 %      Basophil % 0.3 %      Immature Grans % 0.6 %      Neutrophils, Absolute 9.35 10*3/mm3      Lymphocytes, Absolute 3.03 10*3/mm3      Monocytes, Absolute 1.24 10*3/mm3      Eosinophils, Absolute 0.17 10*3/mm3      Basophils, Absolute 0.04 10*3/mm3      Immature Grans, Absolute 0.09 10*3/mm3      nRBC 0.3 /100 WBC     POC Glucose Once [[id number]]  (Normal) Collected:  06/14/19 1609    Specimen:  Blood Updated:  06/14/19 1630     Glucose 75 mg/dL      Comment: : [id number] John AprilMeter ID: [id number]       Lactic Acid, Reflex [[id number]]  (Normal)  Collected:  06/14/19 1327    Specimen:  Blood Updated:  06/14/19 1356     Lactate 1.7 mmol/L     Lactic Acid, Reflex Timer (This will reflex a repeat order 3-3:15 hours after ordered.) [[id number]] Collected:  06/14/19 0914    Specimen:  Blood Updated:  06/14/19 1300     Extra Tube Hold for add-ons.     Comment: Auto resulted.       Lactic Acid, Plasma [[id number]]  (Abnormal) Collected:  06/14/19 0914    Specimen:  Blood Updated:  06/14/19 0958     Lactate 2.1 mmol/L     Basic Metabolic Panel [[id number]]  (Abnormal) Collected:  06/14/19 0914    Specimen:  Blood Updated:  06/14/19 0951     Glucose 81 mg/dL      BUN 26 mg/dL      Creatinine 1.54 mg/dL      Sodium 141 mmol/L      Potassium 3.9 mmol/L      Chloride 110 mmol/L      CO2 24.0 mmol/L      Calcium 8.9 mg/dL      eGFR Non African Amer 47 mL/min/1.73      BUN/Creatinine Ratio 16.9     Anion Gap 7.0 mmol/L     Narrative:       GFR Normal >60  Chronic Kidney Disease <60  Kidney Failure <15    CK [[id number]]  (Normal) Collected:  06/14/19 0349    Specimen:  Blood Updated:  06/14/19 0804     Creatine Kinase 139 U/L     Phenytoin Level, Total [[id number]]  (Normal) Collected:  06/14/19 0724    Specimen:  Blood Updated:  06/14/19 0750     Phenytoin Level 16.4 mcg/mL     Blood Gas, Arterial With Co-Ox [[id number]]  (Abnormal) Collected:  06/14/19 0539    Specimen:  Arterial Blood Updated:  06/14/19 0540     Site Right Radial     Gera's Test N/A     pH, Arterial 7.467 pH units      Comment: 83 Value above reference range        pCO2, Arterial 27.0 mm Hg      Comment: 84 Value below reference range        pO2, Arterial 135.0 mm Hg      Comment: 83 Value above reference range        HCO3, Arterial 19.5 mmol/L      Comment: 84 Value below reference range        Base Excess, Arterial -2.9 mmol/L      Comment: 84 Value below reference range        O2 Saturation, Arterial 99.3 %      Comment: 83 Value above reference range        Hemoglobin, Blood Gas 13.3 g/dL      Comment:  84 Value below reference range        Hematocrit, Blood Gas 40.6 %      Oxyhemoglobin 97.7 %      Methemoglobin 1.10 %      Carboxyhemoglobin 0.5 %      A-a Gradiant -- mmHg      Comment: UNABLE TO CALCULATE        Temperature 37.0 C      Sodium, Arterial 141 mmol/L      Potassium, Arterial 3.8 mmol/L      Barometric Pressure for Blood Gas 754 mmHg      Modality Ventilator     FIO2 30 %      Ventilator Mode AC     Set Tidal Volume 600     Set Mech Resp Rate 14.0     PEEP 5.0     Note --     Collected by [id number]     Comment: Meter: [id number]     :  [id number]        pH, Temp Corrected -- pH Units      pCO2, Temperature Corrected -- mm Hg      pO2, Temperature Corrected -- mm Hg     [place] [facility] [[id number]] Collected:  06/14/19 0349    Specimen:  Blood Updated:  06/14/19 0500    Narrative:       The following orders were created for panel order [place].  Procedure                               Abnormality         Status                     ---------                               -----------         ------                     Light Blue Top[[id number]]                                   Final result               Green Top (Gel)[[id number]]                                  Final result               Lavender Top[[id number]]                                     Final result               Red Top[[id number]]                                          Final result                 Please view results for these tests on the individual orders.    Light Blue Top [[id number]] Collected:  06/14/19 0349    Specimen:  Blood Updated:  06/14/19 0500     Extra Tube hold for add-on     Comment: Auto resulted       Green Top (Gel) [[id number]] Collected:  06/14/19 0349    Specimen:  Blood Updated:  06/14/19 0500     Extra Tube Hold for add-ons.     Comment: Auto resulted.       Lavender Top [[id number]] Collected:  06/14/19 0349    Specimen:  Blood Updated:  06/14/19 0500     Extra Tube hold for add-on     Comment: Auto resulted        Red Top [[id number]] Collected:  06/14/19 0349    Specimen:  Blood Updated:  06/14/19 0500     Extra Tube Hold for add-ons.     Comment: Auto resulted.       CBC & Differential [[id number]] Collected:  06/14/19 0349    Specimen:  Blood Updated:  06/14/19 0420    Narrative:       The following orders were created for panel order CBC & Differential.  Procedure                               Abnormality         Status                     ---------                               -----------         ------                     CBC Auto Differential[[id number]]        Abnormal            Final result                 Please view results for these tests on the individual orders.    CBC Auto Differential [[id number]]  (Abnormal) Collected:  06/14/19 0349    Specimen:  Blood Updated:  06/14/19 0420     WBC 11.21 10*3/mm3      RBC 4.54 10*6/mm3      Hemoglobin 12.9 g/dL      Hematocrit 39.0 %      MCV 85.9 fL      MCH 28.4 pg      MCHC 33.1 g/dL      RDW 15.1 %      RDW-SD 47.5 fl      MPV 10.4 fL      Platelets 274 10*3/mm3      Neutrophil % 71.8 %      Lymphocyte % 18.7 %      Monocyte % 7.7 %      Eosinophil % 0.3 %      Basophil % 0.2 %      Immature Grans % 1.3 %      Neutrophils, Absolute 8.05 10*3/mm3      Lymphocytes, Absolute 2.10 10*3/mm3      Monocytes, Absolute 0.86 10*3/mm3      Eosinophils, Absolute 0.03 10*3/mm3      Basophils, Absolute 0.02 10*3/mm3      Immature Grans, Absolute 0.15 10*3/mm3      nRBC 0.0 /100 WBC     Magnesium [[id number]]  (Abnormal) Collected:  06/14/19 0349    Specimen:  Blood Updated:  06/14/19 0419     Magnesium 2.3 mg/dL     Comprehensive Metabolic Panel [[id number]]  (Abnormal) Collected:  06/14/19 0349    Specimen:  Blood Updated:  06/14/19 0419     Glucose 113 mg/dL      BUN 27 mg/dL      Creatinine 1.70 mg/dL      Sodium 143 mmol/L      Potassium 5.4 mmol/L      Chloride 110 mmol/L      CO2 28.0 mmol/L      Calcium 8.9 mg/dL      Total Protein 6.3 g/dL      Albumin 3.50 g/dL      ALT  "(SGPT) 35 U/L      AST (SGOT) 39 U/L      Alkaline Phosphatase 64 U/L      Total Bilirubin 0.3 mg/dL      eGFR Non African Amer 42 mL/min/1.73      Globulin 2.8 gm/dL      A/G Ratio 1.3 g/dL      BUN/Creatinine Ratio 15.9     Anion Gap 5.0 mmol/L     Narrative:       GFR Normal >60  Chronic Kidney Disease <60  Kidney Failure <15    Valproic Acid Level, Total [[id number]]  (Normal) Collected:  06/14/19 0349    Specimen:  Blood Updated:  06/14/19 0417     Valproic Acid 84.4 mcg/mL         Hospital Course:  The patient is a 54 y.o. male who presented to [facility] with seizures.    HPI 6/14/19 by Dr. Webb:  \" Mr. Savage is a 53 yo M who presents with a seizure.  Patient has a history of a known grade 3 astrocytoma.  He has been following with neurology and neurosurgery.  Patient is supposed to undergo chemotherapy and radiation therapy.  Patient was diagnosed in April 2019.       Per ER report, the patient passed out and started seizing and may have had head trauma.  When he arrived he was very agitated and confused and then had repeated seizures.  He was subsequently intubated for continued seizures and airway protection.     Patient was intubated and sedated when I arrived to being hooked up to EEG.  No family at bedside.     In the ER, it was noted that he had a creatinine of 1.7, potassium of 5.4, and a mild leukocytosis thought to be reactive.  Patient also has a mild anemia.  Patient's baseline creatinine is that of chronic kidney disease stage III and is approximately 1.2-1.3.\"    Hospital Course:  Patient was intubated in the ER due to concerns for status epilepticus.  Neurology was consulted and EEG was obtained.  When propofol was paused on EEG he was shown to posture.  He also showed burst-suppression pattern.  Patient was keep intubated and deeply sedated for several days on propofol.  Patient was weaned off propofol after starting on steroids and became awake and following commands on 6/16.  " "Patient was weaned and extubated to nasal cannula.      Patient was initially treated with vancomycin and zosyn empirically for possible pneumonia but these were eventually stopped as patient improved.    Neurosurgery was consulted.  Not a surgical candidate.  Patient has poor prognosis.  Patient was started on steroids with some improvement seen immediately.  These will be tapered and neurosurgery to finish taper and long-term plans for steroids.    Oncology was consulted, patient has completed two radiation treatments and temodar for 2 days.  These were held during admission.    Per neurology:  Patient was tried on 200 mg BID of lacosamide but was found to have bradycardia, neurology thought might be related to the medication and thus he did not tolerate it.  Patient also had issues with insurance approval for lacosamide at last admission which resulted in delayed obtaining and administration of this medication which may have contributed to his readmission.  Patient will have anti-epileptic levels drawn by Galen Todd on Monday and followed by neurology and follow-up with them on 7/10/19.    Patient will be tapered on steroids to some degree, but will defer further tapering to neurosurgery, radiation oncology, and oncology.  See taper schedule below.      PT and OT consulted and worked with patient he eventually returned to baseline.    Physical Exam on Discharge:  /85 (BP Location: Right arm, Patient Position: Sitting)   Pulse 83   Temp 97.5 °F (36.4 °C) (Oral)   Resp 18   Ht 172.7 cm (68\")   Wt 88.5 kg (195 lb 2.1 oz)   SpO2 92%   BMI 29.67 kg/m²    Physical Exam   Constitutional: He is oriented to person, place, and time. No distress.   HENT:   Head: Normocephalic and atraumatic.   Eyes: Conjunctivae are normal. No scleral icterus.   Neck: Neck supple. No JVD present.   Cardiovascular: Normal rate, regular rhythm and intact distal pulses. Exam reveals no gallop and no friction rub.   No murmur " heard.  Pulmonary/Chest: Effort normal and breath sounds normal. No stridor. No respiratory distress. He has no wheezes.   Abdominal: Soft. Bowel sounds are normal. He exhibits no distension. There is no tenderness. There is no guarding.   Musculoskeletal: He exhibits no edema.   Neurological: He is alert and oriented to person, place, and time.   Mild left facial twitching   Skin: Skin is warm and dry. He is not diaphoretic. No erythema.   Psychiatric: He has a normal mood and affect. His behavior is normal.   Nursing note and vitals reviewed.      Condition on Discharge: Stable, poor porgnosis    Discharge Disposition:  Home or Self Care    Discharge Medications:     Discharge Medications      New Medications      Instructions Start Date   oxyCODONE-acetaminophen 5-325 MG per tablet  Commonly known as:  PERCOCET   1 tablet, Oral, Every 6 Hours PRN         Changes to Medications      Instructions Start Date   dexamethasone 1 MG tablet  Commonly known as:  DECADRON  What changed:  See the new instructions.   Take 6 tablets by mouth 2 (Two) Times a Day for 3 days, THEN 3 tablets 2 (Two) Times a Day for 10 days.   Start Date:  6/20/2019     divalproex 500 MG DR tablet  Commonly known as:  DEPAKOTE  What changed:    · how much to take  · when to take this   1,500 mg, Oral, Every 8 Hours Scheduled      Lacosamide 150 MG tablet  What changed:    · medication strength  · how much to take   150 mg, Oral, Every 12 Hours Scheduled      levETIRAcetam 250 MG tablet  Commonly known as:  KEPPRA  What changed:    · medication strength  · how much to take  · when to take this   1,250 mg, Oral, 2 Times Daily      QUEtiapine 50 MG tablet  Commonly known as:  SEROquel  What changed:    · medication strength  · how much to take  · when to take this   50 mg, Oral, Nightly         Continue These Medications      Instructions Start Date   allopurinol 300 MG tablet  Commonly known as:  ZYLOPRIM   300 mg, Oral, Daily      ALPRAZolam 0.25  MG tablet  Commonly known as:  XANAX   0.25 mg, Oral, 2 Times Daily PRN      aspirin 81 MG tablet   81 mg, Oral, Daily      fenofibrate 145 MG tablet  Commonly known as:  TRICOR   145 mg, Oral, Daily      fish oil 1000 MG capsule capsule   2,000 mg, Oral, Daily With Breakfast      losartan 100 MG tablet  Commonly known as:  COZAAR   100 mg, Oral, Daily      omeprazole 10 MG capsule  Commonly known as:  PRILOSEC   10 mg, Oral, Daily, For acid reflux      venlafaxine  MG 24 hr capsule  Commonly known as:  EFFEXOR-XR   150 mg, Oral, Daily         Stop These Medications    carvedilol 25 MG tablet  Commonly known as:  COREG     CloNIDine 0.1 MG tablet  Commonly known as:  CATAPRES     colchicine 0.6 MG tablet     cyclobenzaprine 10 MG tablet  Commonly known as:  FLEXERIL     spironolactone 25 MG tablet  Commonly known as:  ALDACTONE        ASK your doctor about these medications      Instructions Start Date   vitamin B-6 100 MG tablet  Commonly known as:  PYRIDOXINE   100 mg, Oral, Daily           Discharge Diet:   Diet Instructions     Diet: Regular; Thin      Discharge Diet:  Regular    Fluid Consistency:  Thin        Regular solids with thin liquids 6/19/2019 9:25                  Activity at Discharge:   Activity Instructions     Activity as Tolerated      Other Activity Instructions      Activity Instructions: No driving.          Follow-up Appointments:   Future Appointments   Date Time Provider Department Center   6/21/2019  8:50 AM  PAD TRUBEAM SRS LINEAR ACCELERATOR [facility]   6/21/2019 12:35 PM PAD RAD ONC BILLING ONLY [facility]   6/24/2019  8:50 AM  PAD TRUBEAM SRS LINEAR ACCELERATOR [facility]   6/25/2019  8:50 AM  PAD TRUBEAM SRS LINEAR ACCELERATOR [facility]   6/26/2019  8:50 AM  PAD TRUBEAM SRS LINEAR ACCELERATOR [facility]   6/27/2019  8:50 AM  PAD TRUBEAM SRS LINEAR ACCELERATOR [facility]   6/28/2019  8:50 AM  PAD TRUBEAM SRS LINEAR ACCELERATOR [facility]    7/1/2019  8:50 AM BH PAD TRUBEAM SRS LINEAR ACCELERATOR [facility]   7/2/2019  8:50 AM BH PAD TRUBEAM SRS LINEAR ACCELERATOR [facility]   7/3/2019  8:50 AM BH PAD TRUBEAM SRS LINEAR ACCELERATOR [facility]   7/4/2019  8:50 AM BH PAD TRUBEAM SRS LINEAR ACCELERATOR [facility]   7/5/2019  8:50 AM BH PAD TRUBEAM SRS LINEAR ACCELERATOR [facility]   7/8/2019  8:50 AM BH PAD TRUBEAM SRS LINEAR ACCELERATOR [facility]   7/9/2019  8:50 AM BH PAD TRUBEAM SRS LINEAR ACCELERATOR [facility]   7/10/2019  8:50 AM BH PAD TRUBEAM SRS LINEAR ACCELERATOR [facility]   7/10/2019  1:20 PM Royal Todd PA [facility] N PAD None   7/11/2019  8:50 AM BH PAD TRUBEAM SRS LINEAR ACCELERATOR [facility] RO [facility]   7/12/2019  8:50 AM BH PAD TRUBEAM SRS LINEAR ACCELERATOR [facility] RO P[facility]   7/15/2019  8:50 AM BH PAD TRUBEAM SRS LINEAR ACCELERATOR [facility]   7/16/2019  8:50 AM BH PAD TRUBEAM SRS LINEAR ACCELERATOR BH [facility] RO P[facility]   7/17/2019  8:50 AM BH PAD TRUBEAM SRS LINEAR ACCELERATOR [facility]   7/17/2019 11:00 AM Desi Gutierrez APRN [facility] N PAD None   7/18/2019  8:50 AM BH PAD TRUBEAM SRS LINEAR ACCELERATOR BH P[facility] RO PAD   7/19/2019  8:50 AM BH PAD TRUBEAM SRS LINEAR ACCELERATOR BH [facility]AD RO PAD   7/22/2019  8:50 AM BH PAD TRUBEAM SRS LINEAR ACCELERATOR BH [facility] RO PAD   7/23/2019  8:50 AM BH PAD TRUBEAM SRS LINEAR ACCELERATOR BH PAD RO PAD   7/24/2019  8:50 AM BH PAD TRUBEAM SRS LINEAR ACCELERATOR BH [facility] RO P[facility]   8/27/2019 11:00 AM PAD MRI 1 BH PAD MRI PAD   8/27/2019 12:30 PM Dillon Trevino MD [facility] NS PAD None       Test Results Pending at Discharge: None    Jairon Webb MD  06/20/19  2:03 PM    Time: 45 minutes.         Electronically signed by Jairon Webb MD at 6/20/2019  8:34 PM

## 2019-06-22 ENCOUNTER — READMISSION MANAGEMENT (OUTPATIENT)
Dept: CALL CENTER | Facility: HOSPITAL | Age: 54
End: 2019-06-22

## 2019-06-22 ENCOUNTER — HOSPITAL ENCOUNTER (INPATIENT)
Age: 54
LOS: 1 days | Discharge: HOME | DRG: 101 | End: 2019-06-23
Attending: HOSPITALIST | Admitting: INTERNAL MEDICINE
Payer: MEDICAID

## 2019-06-22 DIAGNOSIS — R56.9 SEIZURES (HCC): Primary | ICD-10-CM

## 2019-06-22 PROBLEM — R41.0 CONFUSION: Status: ACTIVE | Noted: 2019-06-22

## 2019-06-22 PROBLEM — R29.810 FACIAL DROOP: Status: ACTIVE | Noted: 2019-06-22

## 2019-06-22 PROBLEM — R26.9 GAIT ABNORMALITY: Status: ACTIVE | Noted: 2019-06-22

## 2019-06-22 PROBLEM — R47.1 DYSARTHRIA: Status: ACTIVE | Noted: 2019-06-22

## 2019-06-22 PROBLEM — C71.9 ASTROCYTOMA (HCC): Status: ACTIVE | Noted: 2019-06-22

## 2019-06-22 PROBLEM — R53.1 WEAKNESS: Status: ACTIVE | Noted: 2019-06-22

## 2019-06-22 PROBLEM — R13.19 OTHER DYSPHAGIA: Status: ACTIVE | Noted: 2019-06-22

## 2019-06-22 PROCEDURE — 6370000000 HC RX 637 (ALT 250 FOR IP): Performed by: INTERNAL MEDICINE

## 2019-06-22 PROCEDURE — 6370000000 HC RX 637 (ALT 250 FOR IP): Performed by: PSYCHIATRY & NEUROLOGY

## 2019-06-22 PROCEDURE — 92610 EVALUATE SWALLOWING FUNCTION: CPT

## 2019-06-22 PROCEDURE — 1210000000 HC MED SURG R&B

## 2019-06-22 PROCEDURE — 99223 1ST HOSP IP/OBS HIGH 75: CPT | Performed by: PSYCHIATRY & NEUROLOGY

## 2019-06-22 PROCEDURE — 96376 TX/PRO/DX INJ SAME DRUG ADON: CPT

## 2019-06-22 PROCEDURE — 6370000000 HC RX 637 (ALT 250 FOR IP): Performed by: HOSPITALIST

## 2019-06-22 PROCEDURE — 2580000003 HC RX 258: Performed by: INTERNAL MEDICINE

## 2019-06-22 PROCEDURE — G0378 HOSPITAL OBSERVATION PER HR: HCPCS

## 2019-06-22 PROCEDURE — 96374 THER/PROPH/DIAG INJ IV PUSH: CPT

## 2019-06-22 PROCEDURE — 2580000003 HC RX 258: Performed by: HOSPITALIST

## 2019-06-22 PROCEDURE — G0379 DIRECT REFER HOSPITAL OBSERV: HCPCS

## 2019-06-22 PROCEDURE — 6360000002 HC RX W HCPCS: Performed by: HOSPITALIST

## 2019-06-22 PROCEDURE — 99233 SBSQ HOSP IP/OBS HIGH 50: CPT | Performed by: INTERNAL MEDICINE

## 2019-06-22 PROCEDURE — 96375 TX/PRO/DX INJ NEW DRUG ADDON: CPT

## 2019-06-22 RX ORDER — DIVALPROEX SODIUM 500 MG/1
1500 TABLET, DELAYED RELEASE ORAL EVERY 8 HOURS SCHEDULED
Status: DISCONTINUED | OUTPATIENT
Start: 2019-06-22 | End: 2019-06-23 | Stop reason: HOSPADM

## 2019-06-22 RX ORDER — CHLORAL HYDRATE 500 MG
2000 CAPSULE ORAL
COMMUNITY
End: 2020-03-13

## 2019-06-22 RX ORDER — DIVALPROEX SODIUM 500 MG/1
1500 TABLET, DELAYED RELEASE ORAL 3 TIMES DAILY
COMMUNITY
End: 2020-03-13

## 2019-06-22 RX ORDER — DEXAMETHASONE SODIUM PHOSPHATE 4 MG/ML
4 INJECTION, SOLUTION INTRA-ARTICULAR; INTRALESIONAL; INTRAMUSCULAR; INTRAVENOUS; SOFT TISSUE EVERY 6 HOURS
Status: DISCONTINUED | OUTPATIENT
Start: 2019-06-22 | End: 2019-06-23 | Stop reason: HOSPADM

## 2019-06-22 RX ORDER — SODIUM CHLORIDE 0.9 % (FLUSH) 0.9 %
10 SYRINGE (ML) INJECTION PRN
Status: DISCONTINUED | OUTPATIENT
Start: 2019-06-22 | End: 2019-06-23 | Stop reason: HOSPADM

## 2019-06-22 RX ORDER — ACETAMINOPHEN 325 MG/1
650 TABLET ORAL EVERY 4 HOURS PRN
Status: DISCONTINUED | OUTPATIENT
Start: 2019-06-22 | End: 2019-06-23 | Stop reason: HOSPADM

## 2019-06-22 RX ORDER — SODIUM CHLORIDE 9 MG/ML
INJECTION, SOLUTION INTRAVENOUS CONTINUOUS
Status: DISCONTINUED | OUTPATIENT
Start: 2019-06-22 | End: 2019-06-22

## 2019-06-22 RX ORDER — LACOSAMIDE 50 MG/1
150 TABLET ORAL EVERY 12 HOURS
COMMUNITY

## 2019-06-22 RX ORDER — LANOLIN ALCOHOL/MO/W.PET/CERES
100 CREAM (GRAM) TOPICAL DAILY
COMMUNITY
End: 2020-03-13

## 2019-06-22 RX ORDER — QUETIAPINE FUMARATE 50 MG/1
50 TABLET, FILM COATED ORAL NIGHTLY
COMMUNITY
End: 2020-03-13

## 2019-06-22 RX ORDER — PREGABALIN 75 MG/1
150 CAPSULE ORAL 2 TIMES DAILY
Status: DISCONTINUED | OUTPATIENT
Start: 2019-06-22 | End: 2019-06-23 | Stop reason: HOSPADM

## 2019-06-22 RX ORDER — LACOSAMIDE 50 MG/1
200 TABLET ORAL 2 TIMES DAILY
Status: DISCONTINUED | OUTPATIENT
Start: 2019-06-22 | End: 2019-06-23 | Stop reason: HOSPADM

## 2019-06-22 RX ORDER — ALLOPURINOL 300 MG/1
300 TABLET ORAL DAILY
COMMUNITY
End: 2020-03-13

## 2019-06-22 RX ORDER — OXYCODONE HYDROCHLORIDE AND ACETAMINOPHEN 5; 325 MG/1; MG/1
1 TABLET ORAL EVERY 6 HOURS PRN
Status: DISCONTINUED | OUTPATIENT
Start: 2019-06-22 | End: 2019-06-23 | Stop reason: HOSPADM

## 2019-06-22 RX ORDER — LEVETIRACETAM 500 MG/1
1250 TABLET ORAL 2 TIMES DAILY
COMMUNITY
End: 2019-09-26

## 2019-06-22 RX ORDER — ONDANSETRON 2 MG/ML
4 INJECTION INTRAMUSCULAR; INTRAVENOUS EVERY 6 HOURS PRN
Status: DISCONTINUED | OUTPATIENT
Start: 2019-06-22 | End: 2019-06-23 | Stop reason: HOSPADM

## 2019-06-22 RX ORDER — LORAZEPAM 2 MG/ML
2 INJECTION INTRAMUSCULAR EVERY 4 HOURS PRN
Status: DISCONTINUED | OUTPATIENT
Start: 2019-06-22 | End: 2019-06-23 | Stop reason: HOSPADM

## 2019-06-22 RX ORDER — ALPRAZOLAM 0.25 MG/1
0.25 TABLET ORAL 2 TIMES DAILY PRN
COMMUNITY

## 2019-06-22 RX ORDER — DEXAMETHASONE 2 MG/1
2 TABLET ORAL 2 TIMES DAILY WITH MEALS
COMMUNITY
End: 2020-03-13

## 2019-06-22 RX ORDER — SODIUM CHLORIDE 0.9 % (FLUSH) 0.9 %
10 SYRINGE (ML) INJECTION EVERY 12 HOURS SCHEDULED
Status: DISCONTINUED | OUTPATIENT
Start: 2019-06-22 | End: 2019-06-23 | Stop reason: HOSPADM

## 2019-06-22 RX ORDER — OXYCODONE AND ACETAMINOPHEN 7.5; 325 MG/1; MG/1
1 TABLET ORAL EVERY 4 HOURS PRN
COMMUNITY
End: 2019-12-17 | Stop reason: SDUPTHER

## 2019-06-22 RX ADMIN — DEXAMETHASONE SODIUM PHOSPHATE 4 MG: 4 INJECTION, SOLUTION INTRAMUSCULAR; INTRAVENOUS at 06:13

## 2019-06-22 RX ADMIN — LEVETIRACETAM 1250 MG: 500 TABLET, FILM COATED ORAL at 01:18

## 2019-06-22 RX ADMIN — DEXAMETHASONE SODIUM PHOSPHATE 4 MG: 4 INJECTION, SOLUTION INTRAMUSCULAR; INTRAVENOUS at 17:15

## 2019-06-22 RX ADMIN — SODIUM CHLORIDE 75 ML/HR: 9 INJECTION, SOLUTION INTRAVENOUS at 01:01

## 2019-06-22 RX ADMIN — DEXAMETHASONE SODIUM PHOSPHATE 4 MG: 4 INJECTION, SOLUTION INTRAMUSCULAR; INTRAVENOUS at 13:17

## 2019-06-22 RX ADMIN — LACOSAMIDE 200 MG: 50 TABLET, FILM COATED ORAL at 09:56

## 2019-06-22 RX ADMIN — OXYCODONE HYDROCHLORIDE AND ACETAMINOPHEN 1 TABLET: 5; 325 TABLET ORAL at 01:20

## 2019-06-22 RX ADMIN — DIVALPROEX SODIUM 1500 MG: 500 TABLET, DELAYED RELEASE ORAL at 21:19

## 2019-06-22 RX ADMIN — LEVETIRACETAM 1250 MG: 500 TABLET, FILM COATED ORAL at 21:20

## 2019-06-22 RX ADMIN — DEXAMETHASONE SODIUM PHOSPHATE 4 MG: 4 INJECTION, SOLUTION INTRAMUSCULAR; INTRAVENOUS at 01:19

## 2019-06-22 RX ADMIN — Medication 10 ML: at 21:20

## 2019-06-22 RX ADMIN — DIVALPROEX SODIUM 1500 MG: 500 TABLET, DELAYED RELEASE ORAL at 06:13

## 2019-06-22 RX ADMIN — LORAZEPAM 2 MG: 2 INJECTION INTRAMUSCULAR; INTRAVENOUS at 17:15

## 2019-06-22 RX ADMIN — DIVALPROEX SODIUM 1500 MG: 500 TABLET, DELAYED RELEASE ORAL at 13:17

## 2019-06-22 RX ADMIN — PREGABALIN 150 MG: 75 CAPSULE ORAL at 21:20

## 2019-06-22 RX ADMIN — OXYCODONE HYDROCHLORIDE AND ACETAMINOPHEN 1 TABLET: 5; 325 TABLET ORAL at 10:04

## 2019-06-22 RX ADMIN — Medication 10 ML: at 09:56

## 2019-06-22 RX ADMIN — LACOSAMIDE 200 MG: 50 TABLET, FILM COATED ORAL at 01:02

## 2019-06-22 RX ADMIN — PREGABALIN 150 MG: 75 CAPSULE ORAL at 10:49

## 2019-06-22 RX ADMIN — DIVALPROEX SODIUM 1500 MG: 500 TABLET, DELAYED RELEASE ORAL at 01:19

## 2019-06-22 RX ADMIN — Medication 10 ML: at 13:17

## 2019-06-22 RX ADMIN — LEVETIRACETAM 1250 MG: 500 TABLET, FILM COATED ORAL at 09:54

## 2019-06-22 RX ADMIN — LACOSAMIDE 200 MG: 50 TABLET, FILM COATED ORAL at 21:20

## 2019-06-22 ASSESSMENT — PAIN SCALES - GENERAL
PAINLEVEL_OUTOF10: 7
PAINLEVEL_OUTOF10: 7
PAINLEVEL_OUTOF10: 0

## 2019-06-22 ASSESSMENT — ENCOUNTER SYMPTOMS
BACK PAIN: 0
NAUSEA: 0
CONSTIPATION: 0
VOMITING: 0
SHORTNESS OF BREATH: 0
DIARRHEA: 0

## 2019-06-22 NOTE — OUTREACH NOTE
Medical Week 1 Survey      Responses   Facility patient discharged from?  [facility]   Does the patient have one of the following disease processes/diagnoses(primary or secondary)?  Other   Is there a successful TCM telephone encounter documented?  No   Week 1 attempt successful?  No   Unsuccessful attempts  Attempt 1          Susanne Newsome RN

## 2019-06-22 NOTE — H&P
[address] - History & Physical      PCP: VLADIMIR Gustafson CNP    Date of Admission: (Not on file)    Date of Service: 6/21/2019    Chief Complaint:  Recurrent seizure     History Of Present Illness: The patient is a 49 y.o. male with PMH of un operable astrocytoma of the brain, on chem radiation, had multiple admission for seizures was intubated before came to [facility] [place] ER for seizures in E and LLE was given keppra 1500 IVpb, Vimpat 200 IVPB and ativan, head CT no new edema or shift, EKG -ve, was told by DR. Damian Carrizales in ER pt did not need ICU bed  Past Medical History:        Diagnosis Date    GERD (gastroesophageal reflux disease)     Hyperlipidemia     Hypertension     Seizure ([address] 07/03/2017       Past Surgical History:        Procedure Laterality Date    COLONOSCOPY      OR SHLDR ARTHROSCOP,SURG,CAPSULORRHAPHY Left 7/2/2018    SHOULDER ARTHROSCOPIC BANKART REPAIR, ANTERIOR, POSTERIOR, AND INFERIOR LABRAL REPAIR performed by Bree Wilcox MD at [address] Medications:  Prior to Admission medications    Medication Sig Start Date End Date Taking?  Authorizing Provider   ondansetron (ZOFRAN) 4 MG tablet Take 1 tablet by mouth daily as needed for Nausea or Vomiting 7/2/18   Bree Wilcox MD   spironolactone (ALDACTONE) 25 MG tablet Take 25 mg by mouth daily    Historical Provider, MD   carvedilol (COREG) 25 MG tablet Take 25 mg by mouth 2 times daily (with meals)    Historical Provider, MD   omeprazole (PRILOSEC) 10 MG delayed release capsule Take 10 mg by mouth daily    Historical Provider, MD   losartan (COZAAR) 100 MG tablet Take 100 mg by mouth daily    Historical Provider, MD   fenofibrate (TRICOR) 145 MG tablet Take 145 mg by mouth nightly    Historical Provider, MD   venlafaxine (EFFEXOR XR) 75 MG extended release capsule Take 75 mg by mouth daily    Historical Provider, MD       Allergies:    Lisinopril and Lipitor [atorvastatin]    Social Data:  Imaging:   No orders to display     CBC:No results for input(s): WBC, HGB, HCT, PLT in the last 72 hours. BMP:No results for input(s): NA, K, CL, CO2, BUN, CREATININE, CALCIUM, PHOS in the last 72 hours. Invalid input(s): Juanis Renae results for input(s): AST, ALT, BILIDIR, BILITOT, ALKPHOS in the last 72 hours. Coag Panel: No results for input(s): INR, PROTIME, APTT in the last 72 hours. Cardiac Enzymes: No results for input(s): Kenny Mylar in the last 72 hours. ABGs:No results found for: PHART, PO2ART, GYF6FYB  Urinalysis:No results found for: NITRU, WBCUA, BACTERIA, RBCUA, BLOODU, [address], [address] [place] Problems    Diagnosis Date Noted    Seizures ([facility] [R56.9] 06/21/2019       Assessment and Plan: Active Problems:    [facility]  Resolved Problems:    * No resolved hospital problems.  *    Refractory seizures admitted as above, increased Vimpat from 150 bid to 200 bid, continue keppra 1250 bid, continue depokate 1500 tid LFTs elevated check ammonia might need to lower dose if high, consult neuro , dexamethasne IV  DVT prophylaxis with  SCD           Michelle Melendez  Hospitalist service  6/21/2019  10:31 PM

## 2019-06-22 NOTE — PROGRESS NOTES
Hospitalist Progress Note    Patient:  Diaz Olivas  YOB: 1965  Date of Service: 6/22/2019  MRN: [id number]   Acct: [de-identified]   Primary Care Physician: VLADIMIR Rao CNP  Advance Directive: Full Code  Admit Date: 6/22/2019       Hospital Day: 0  Referring Provider: Fredo Barnes DO    Patient Seen, Chart, Consults, Notes, Labs, Radiology studies reviewed. Subjective:  Diaz Olivas is a 49 y.o. male  whom we are following for astrocytoma and refractory focal seizures. Greatly appreciate neurology's help. He was started on Lyrica this morning. As of right now, his seizures in his left leg and arm have subsided. It appears as though that he has responded to the drug. This is his fourth antiseizure medication.     Allergies:  Lisinopril and Lipitor [atorvastatin]    Medicines:  Current Facility-Administered Medications   Medication Dose Route Frequency Provider Last Rate Last Dose    sodium chloride flush 0.9 % injection 10 mL  10 mL Intravenous 2 times per day Susie Cee MD   10 mL at 06/22/19 0956    sodium chloride flush 0.9 % injection 10 mL  10 mL Intravenous PRN Susie Cee MD        magnesium hydroxide (MILK OF MAGNESIA) 400 MG/5ML suspension 30 mL  30 mL Oral Daily PRN Susie Cee MD        ondansetron [facility]) injection 4 mg  4 mg Intravenous Q6H PRN Susie Cee MD        0.9 % sodium chloride infusion   Intravenous Continuous Susie Cee MD 75 mL/hr at 06/22/19 0101 75 mL/hr at 06/22/19 0101    acetaminophen (TYLENOL) tablet 650 mg  650 mg Oral Q4H PRN Susie Cee MD        LORazepam (ATIVAN) injection 2 mg  2 mg Intravenous Q4H PRN Susie Cee MD        lacosamide (VIMPAT) tablet 200 mg  200 mg Oral BID Susie Cee MD   200 mg at 06/22/19 0956    levETIRAcetam (KEPPRA) tablet 1,250 mg  1,250 mg Oral BID Susie Cee MD   1,250 mg at 06/22/19 0954    divalproex (DEPAKOTE) DR tablet 1,500 mg  1,500 mg Oral 3 times per day [facility] connections:     Talks on phone: Not on file     Gets together: Not on file     Attends Orthodoxy service: Not on file     Active member of club or organization: Not on file     Attends meetings of clubs or organizations: Not on file     Relationship status: Not on file    Intimate partner violence:     Fear of current or ex partner: Not on file     Emotionally abused: Not on file     Physically abused: Not on file     Forced sexual activity: Not on file   Other Topics Concern    Not on file   Social History Narrative    Not on file         Review of Systems:    Review of Systems   Constitutional: Negative for activity change and fever. Respiratory: Negative for shortness of breath. Cardiovascular: Negative for chest pain and leg swelling. Gastrointestinal: Negative for constipation, diarrhea, nausea and vomiting. Genitourinary: Negative for difficulty urinating and dysuria. Musculoskeletal: Positive for gait problem. Negative for back pain and neck pain. Neurological: Positive for weakness. Negative for dizziness and light-headedness. Objective:  Blood pressure 134/86, pulse 78, temperature 98.2 °F (36.8 °C), temperature source Temporal, resp. rate 22, SpO2 98 %. Intake/Output Summary (Last 24 hours) at 6/22/2019 1313  Last data filed at 6/22/2019 0615  Gross per 24 hour   Intake 392.5 ml   Output --   Net 392.5 ml       Physical Exam   Constitutional: He is oriented to person, place, and time. He appears well-developed and well-nourished. HENT:   Head: Normocephalic and atraumatic. Mouth/Throat: Oropharynx is clear and moist.   Eyes: Pupils are equal, round, and reactive to light. Conjunctivae are normal.   Neck: No JVD present. Cardiovascular: Normal rate, regular rhythm and normal heart sounds. Pulmonary/Chest: Effort normal and breath sounds normal.   Abdominal: Soft. Musculoskeletal: He exhibits no edema.    Neurological: He is alert and oriented to person, place, and time. He exhibits abnormal muscle tone. Skin: Skin is warm and dry. Vitals reviewed. Labs:  BMP: No results for input(s): NA, K, CL, CO2, PHOS, BUN, CREATININE, CALCIUM in the last 72 hours. CBC: No results for input(s): WBC, HGB, HCT, MCV, PLT in the last 72 hours. LIVER PROFILE: No results for input(s): AST, ALT, LIPASE, BILIDIR, BILITOT, ALKPHOS in the last 72 hours. Invalid input(s): AMYLASE,  ALB  PT/INR: No results for input(s): PROTIME, INR in the last 72 hours. APTT: No results for input(s): APTT in the last 72 hours. BNP:  No results for input(s): BNP in the last 72 hours. Ionized Calcium:No results for input(s): IONCA in the last 72 hours. Magnesium:No results for input(s): MG in the last 72 hours. Phosphorus:No results for input(s): PHOS in the last 72 hours. HgbA1C: No results for input(s): LABA1C in the last 72 hours. Hepatic: No results for input(s): ALKPHOS, ALT, AST, PROT, BILITOT, BILIDIR, LABALBU in the last 72 hours. Lactic Acid: No results for input(s): LACTA in the last 72 hours. Troponin: No results for input(s): CKTOTAL, CKMB, TROPONINT in the last 72 hours. ABGs: No results for input(s): PH, PCO2, PO2, HCO3, O2SAT in the last 72 hours. CRP:  No results for input(s): CRP in the last 72 hours. Sed Rate:  No results for input(s): SEDRATE in the last 72 hours. Cultures:   No results for input(s): CULTURE in the last 72 hours. No results for input(s): BC, [place] in the last 72 hours. No results for input(s): CXSURG in the last 72 hours. Radiology reports as per the Radiologist  Radiology: No results found. Assessment   Refractory focal seizures. Astrocytoma. Plan:  Increase activity. Continue Lyrica. Possibly home tomorrow if he remains stable.     Cornelius Scales,

## 2019-06-22 NOTE — CONSULTS
acetaminophen (TYLENOL) tablet 650 mg, 650 mg, Oral, Q4H PRN, Lyle Wise MD    LORazepam (ATIVAN) injection 2 mg, 2 mg, Intravenous, Q4H PRN, Lyle Wise MD    lacosamide (VIMPAT) tablet 200 mg, 200 mg, Oral, BID, Lyle Wise MD, 200 mg at 06/22/19 0956    levETIRAcetam (KEPPRA) tablet 1,250 mg, 1,250 mg, Oral, BID, Lyle Wise MD, 1,250 mg at 06/22/19 0954    divalproex (DEPAKOTE) DR tablet 1,500 mg, 1,500 mg, Oral, 3 times per day, Lyle Wise MD, 1,500 mg at 06/22/19 0713    dexamethasone (DECADRON) injection 4 mg, 4 mg, Intravenous, Q6H, Lyle Wise MD, 4 mg at 06/22/19 0395    oxyCODONE-acetaminophen (PERCOCET) 5-325 MG per tablet 1 tablet, 1 tablet, Oral, Q6H PRN, Lyle Wise MD, 1 tablet at 06/22/19 0120    pregabalin (LYRICA) capsule 150 mg, 150 mg, Oral, BID, Charissa Caldera MD    Allergies:  Lisinopril and Lipitor [atorvastatin]    Social History:   TOBACCO:   reports that he quit smoking about 3 years ago. His smoking use included cigarettes. He has a 10.00 pack-year smoking history. He uses smokeless tobacco.  ETOH:   reports that he drinks alcohol. Family History:   No family history on file. Physical Exam:    Vitals: /84   Pulse 72   Temp 97.4 °F (36.3 °C) (Temporal)   Resp 16   SpO2 99%     Constitutional - well developed, well nourished. Eyes - conjunctiva normal.  Pupils react to light  Ear, nose, throat -hearing intact to finger rub No scars, masses, or lesions over external nose or ears, no atrophy of tongue  Neck-symmetric, no masses noted, no jugular vein distension  Respiration- chest wall appears symmetric, good expansion,   normal effort without use of accessory muscles  Musculoskeletal - no significant wasting of muscles noted, no bony deformities  Extremities-no clubbing, cyanosis or edema  Skin - warm, dry, and intact. No rash, erythema, or pallor.   Psychiatric - mood, affect, and behavior appear normal.      Neurological exam  Awake, alert, fluent oriented x 3 slow  Attention and concentration appear impaired  Recent and remote memory appears impaired  Speech witht dysarthria  No clear issues with language of fund of knowledge    Cranial Nerve Exam   CN II- Visual fields grossly unremarkable  CN III, IV,VI-EOMI, No nystagmus, conjugate eye movements, no ptosis  CN V-sensation intact to LT over face  CN VII-left lower facial droop  CN VIII-Hearing intact to finger rub  CN IX and X- Palate elevates in midline  CN XI-good shoulder shrug  CN XII-Tongue midline with no fasciculations or fibrillations    Motor Exam  giveway weakness left arm and left leg no cogwheeling, normal tone    Sensory Exam  Sensation intact to light touch and temperature upper and lower extremities bilaterally    Reflexes   2+ biceps bilaterally  2+ brachioradialis  2+patella  2+ ankle jerks  No clonus ankles  No Escamilla's sign bilateral hands    Tremors- no tremors in hands or head noted    Gait  Not tested    Coordination  Finger to nose-slow left        CBC: No results for input(s): WBC, HGB, PLT in the last 72 hours. BMP:  No results for input(s): NA, K, CL, CO2, BUN, CREATININE, GLUCOSE in the last 72 hours. Hepatic: No results for input(s): AST, ALT, ALB, BILITOT, ALKPHOS in the last 72 hours. Lipids: No results for input(s): CHOL, HDL in the last 72 hours. Invalid input(s): LDLCALCU    INR: No results for input(s): INR in the last 72 hours.         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  Continues with focal seizures despite multiple antiepileptics in the past, intubation with coma induction and relatively appropriate doses of 3 current antiepileptics   Exam with left sided weakness  CT prior to transfer mass noted with no hemorrhage    DW with patient and mother that most likely we will not be able to stop these seizure    After multiple antiepileptics and current 3 antiepileptic medicine I suspect the chance of resolving his seizures is about 1%    Will add Lyrica to his regimen to see if 4th drug has any benefit  Patient and family to discuss code status    He appears to be a hospice candidate but family wish to continue treatment at this time. On decadron    Consult oncology for any other recommendations      Please feel free to call with any questions   [phone] (cell phone)    Dr Suyapa Benitez certified in Neurology  Board Certified in Clinical Neurophysiology  Fellowship Trained in [facility] Neurophysiology    EMR Dragon/transcription disclaimer:Significant part of this  encounter note is electronic transcription/translation of spoken language to printed text. The electronic translation of spoken language may be erroneous, or at times, nonsensical words or phrases may be inadvertently transcribed.  Although I have reviewed the note for such errors, some may still exist.

## 2019-06-22 NOTE — PROGRESS NOTES
Bee Hayden transferred to [facility] from [phone] via bed. Reason for transfer: Lower level of care. Explained reason for transfer to Patient and Family. Belongings: Clothing/Wallet with patient at bedside . Soft chart transferred with patient: Yes. Telemetry box number N/A transferred with patient: N/A. Report given to: Ceci Motley, via telephone.       Electronically signed by Roanna Bernheim, RN on 6/22/2019 at 6:11 PM

## 2019-06-22 NOTE — PROGRESS NOTES
Strategies: Alternate solids and liquids;Small bites/sips;Upright as possible for all oral intake;Remain upright for 30-45 minutes after meals;Eat/Feed slowly      General  Chart Reviewed: Yes  Behavior/Cognition: Alert; Cooperative  Respiratory Status: Room air  Follows Directions: Simple  Patient Positioning: Upright in bed  Baseline Vocal Quality: Weak  Consistencies Administered: Reg solid;Mechanical soft;Puree; Thin    Oral Phase Dysfunction  Oral Phase  Oral Phase: Exceptions  Oral Phase Dysfunction  Impaired Mastication: Reg Solid  Oral Phase  Oral Phase - Comment: Patient demonstrating with mild decrease for oral prep and mastication with more solids. Patient does have sore on his tongue patient and his mother are uncertain if he had bit his tongue during seizures or if it was due to intubation. Oral transit of thin liquids measuring 1/2 seconds in length. Min to no residue noted post swallows. Indicators of Pharyngeal Phase Dysfunction   Pharyngeal Phase  Pharyngeal Phase: Exceptions  Indicators of Pharyngeal Phase Dysfunction  Decreased Laryngeal Elevation: All  Pharyngeal Phase   Pharyngeal: Mild decreased/sluggish laryngeal elevation noted with all consistency trials, however no overt s/s of aspiration with regular solid, soft solid, puree or thin liquids via cup/straw.        Education  Patient Education Response: Demonstrated understanding    Electronically signed by Cintia Nath SLP on 6/22/2019 at 11:26 AM

## 2019-06-23 ENCOUNTER — READMISSION MANAGEMENT (OUTPATIENT)
Dept: CALL CENTER | Facility: HOSPITAL | Age: 54
End: 2019-06-23

## 2019-06-23 VITALS
RESPIRATION RATE: 18 BRPM | TEMPERATURE: 97.2 F | HEART RATE: 65 BPM | OXYGEN SATURATION: 98 % | SYSTOLIC BLOOD PRESSURE: 124 MMHG | DIASTOLIC BLOOD PRESSURE: 82 MMHG

## 2019-06-23 PROBLEM — R47.1 DYSARTHRIA: Status: RESOLVED | Noted: 2019-06-22 | Resolved: 2019-06-23

## 2019-06-23 PROBLEM — R41.0 CONFUSION: Status: RESOLVED | Noted: 2019-06-22 | Resolved: 2019-06-23

## 2019-06-23 PROBLEM — R26.9 GAIT ABNORMALITY: Status: RESOLVED | Noted: 2019-06-22 | Resolved: 2019-06-23

## 2019-06-23 PROBLEM — R13.19 OTHER DYSPHAGIA: Status: RESOLVED | Noted: 2019-06-22 | Resolved: 2019-06-23

## 2019-06-23 PROBLEM — R29.810 FACIAL DROOP: Status: RESOLVED | Noted: 2019-06-22 | Resolved: 2019-06-23

## 2019-06-23 LAB
ANION GAP SERPL CALCULATED.3IONS-SCNC: 11 MMOL/L (ref 7–19)
BUN BLDV-MCNC: 23 MG/DL (ref 6–20)
CALCIUM SERPL-MCNC: 8.7 MG/DL (ref 8.6–10)
CHLORIDE BLD-SCNC: 104 MMOL/L (ref 98–111)
CO2: 28 MMOL/L (ref 22–29)
CREAT SERPL-MCNC: 1.2 MG/DL (ref 0.5–1.2)
GFR NON-AFRICAN AMERICAN: >60
GLUCOSE BLD-MCNC: 100 MG/DL (ref 74–109)
HCT VFR BLD CALC: 36.4 % (ref 42–52)
HEMOGLOBIN: 11.5 G/DL (ref 14–18)
MCH RBC QN AUTO: 28.8 PG (ref 27–31)
MCHC RBC AUTO-ENTMCNC: 31.6 G/DL (ref 33–37)
MCV RBC AUTO: 91 FL (ref 80–94)
PDW BLD-RTO: 16 % (ref 11.5–14.5)
PLATELET # BLD: 260 K/UL (ref 130–400)
PMV BLD AUTO: 10.1 FL (ref 9.4–12.4)
POTASSIUM REFLEX MAGNESIUM: 4.1 MMOL/L (ref 3.5–5)
RBC # BLD: 4 M/UL (ref 4.7–6.1)
SODIUM BLD-SCNC: 143 MMOL/L (ref 136–145)
VALPROIC ACID LEVEL: 104.4 UG/ML (ref 50–100)
WBC # BLD: 10.1 K/UL (ref 4.8–10.8)

## 2019-06-23 PROCEDURE — 80164 ASSAY DIPROPYLACETIC ACD TOT: CPT

## 2019-06-23 PROCEDURE — 6370000000 HC RX 637 (ALT 250 FOR IP): Performed by: INTERNAL MEDICINE

## 2019-06-23 PROCEDURE — 6360000002 HC RX W HCPCS: Performed by: INTERNAL MEDICINE

## 2019-06-23 PROCEDURE — 36415 COLL VENOUS BLD VENIPUNCTURE: CPT

## 2019-06-23 PROCEDURE — 97161 PT EVAL LOW COMPLEX 20 MIN: CPT

## 2019-06-23 PROCEDURE — G0379 DIRECT REFER HOSPITAL OBSERV: HCPCS

## 2019-06-23 PROCEDURE — 80048 BASIC METABOLIC PNL TOTAL CA: CPT

## 2019-06-23 PROCEDURE — 85027 COMPLETE CBC AUTOMATED: CPT

## 2019-06-23 PROCEDURE — 97530 THERAPEUTIC ACTIVITIES: CPT

## 2019-06-23 PROCEDURE — G0378 HOSPITAL OBSERVATION PER HR: HCPCS

## 2019-06-23 PROCEDURE — 96376 TX/PRO/DX INJ SAME DRUG ADON: CPT

## 2019-06-23 PROCEDURE — 99233 SBSQ HOSP IP/OBS HIGH 50: CPT | Performed by: PSYCHIATRY & NEUROLOGY

## 2019-06-23 PROCEDURE — 99239 HOSP IP/OBS DSCHRG MGMT >30: CPT | Performed by: INTERNAL MEDICINE

## 2019-06-23 RX ORDER — PREGABALIN 150 MG/1
150 CAPSULE ORAL 2 TIMES DAILY
Qty: 60 CAPSULE | Refills: 3 | Status: SHIPPED | OUTPATIENT
Start: 2019-06-23 | End: 2019-08-28

## 2019-06-23 RX ORDER — DIAZEPAM 10 MG/2ML
10 GEL RECTAL
Qty: 15 EACH | Refills: 3 | Status: SHIPPED | OUTPATIENT
Start: 2019-06-23 | End: 2020-03-13

## 2019-06-23 RX ORDER — DIAZEPAM 10 MG/2ML
10 GEL RECTAL
Status: DISCONTINUED | OUTPATIENT
Start: 2019-06-23 | End: 2019-06-23 | Stop reason: HOSPADM

## 2019-06-23 RX ADMIN — LEVETIRACETAM 1250 MG: 500 TABLET, FILM COATED ORAL at 11:18

## 2019-06-23 RX ADMIN — LACOSAMIDE 200 MG: 50 TABLET, FILM COATED ORAL at 11:18

## 2019-06-23 RX ADMIN — DEXAMETHASONE SODIUM PHOSPHATE 4 MG: 4 INJECTION, SOLUTION INTRAMUSCULAR; INTRAVENOUS at 05:53

## 2019-06-23 RX ADMIN — PREGABALIN 150 MG: 75 CAPSULE ORAL at 11:18

## 2019-06-23 RX ADMIN — DEXAMETHASONE SODIUM PHOSPHATE 4 MG: 4 INJECTION, SOLUTION INTRAMUSCULAR; INTRAVENOUS at 02:06

## 2019-06-23 NOTE — DISCHARGE INSTR - DIET

## 2019-06-23 NOTE — PROGRESS NOTES
Rounded with Dr. Mya Koch in Mr. Carrillo Melissa room. Dr. Mya Koch said it's ok from his standpoint to discharge him home. He said that at this point, seizures are just basically part of it and we just have to take it one day at a time. He would like him to have #2 Diastat 20 mg per rectum PRN at home in case of seizures so that he doesn't have to come to the hospital every time he has a seizure.  But he did inform the patient and his mother that if the seizures are unrelieved by medication then to bring him to the hospital. Electronically signed by Suzanna Hanson RN on 6/23/2019 at 11:31 AM

## 2019-06-23 NOTE — PROGRESS NOTES
Orientation     Social/Functional History  Social/Functional History  Lives With: Daughter, Family  Type of Home: House  Home Access: Stairs to enter without rails  Entrance Stairs - Number of Steps: 2  Bathroom Shower/Tub: Walk-in shower  Bathroom Toilet: Handicap height  Bathroom Equipment: Built-in shower seat  Home Equipment: Cane, Wheelchair-manual, Rolling walker  Receives Help From: Family  Cognition        Objective          AROM RLE (degrees)  RLE AROM: WFL  AROM LLE (degrees)  LLE AROM : WFL  LLE General AROM: TONE PRESENT IN L LE  AROM RUE (degrees)  RUE AROM : WFL  AROM LUE (degrees)  LUE General AROM: LIMITED COMPARED TO R  Strength RLE  Strength RLE: WFL  Strength LLE  Comment: ABLE TO MOVE HIP AND ANKLE AGAINST GRAVITY; DIFFICULTY WITH FOOT CLEARANCE WHEN WALKING  Tone LLE  LLE Tone: Hypertonic  Coordination  Rapid Alternating Movements: Normal  Sensation  Overall Sensation Status: WFL(Pt ABLE TO IDENTIFY LOCATION OF LIGHT TOUCH ON FEET. REPORTS BOTH FEET AND LEGS FEEL THE SAME)  Bed mobility  Supine to Sit: Stand by assistance  Transfers  Sit to Stand: Contact guard assistance  Stand to sit: Contact guard assistance  Ambulation  Ambulation?: Yes  Ambulation 1  Surface: level tile  Device: Rolling Walker;Single point cane  Other Apparatus: (GT BELT)  Assistance: Minimal assistance;Contact guard assistance  Quality of Gait: Pt HAS DIFFICULTY CLEARING L LE DURING THE ADVANCEMENT OF THE LEG. LOB NEEDING CORRECTION FROM PT. Gait Deviations: Deviated path; Slow Vandana; Shuffles;Decreased step length;Decreased step height  Distance: 10 FT WITH SPC, 40 FT WITH RW  Comments: USED RW TO INCREASE STABILITY.  Pt HAS DIFFICLTY PUTTING L HAND ON RW, BUT CAN HOLD ON ONCE HAND IS PLACED              Plan   Plan  Times per week: 7  Times per day: Daily  Plan weeks: 2  Current Treatment Recommendations: Strengthening, Gait Training, Patient/Caregiver Education & Training, Balance Training, Functional Mobility Training, Endurance Training, Positioning, Transfer Training  Safety Devices  Type of devices: Nurse notified, Gait belt(LEFT ON [employer] NURSE NOTIFIED AND ON HER WAY.  Pt SITTING UP AND STABLE)    G-Code       OutComes Score                                                  AM-PAC Score             Goals  Short term goals  Time Frame for Short term goals: 14 DAYS TILL RE-EVAL  Short term goal 1:  FT, RW, SBA  Short term goal 2: SUP<>SIT, SUPERVISION  Short term goal 3: SIT<>STAND, SUPERVISION  Patient Goals   Patient goals : D/C HOME WITH FAMILY SUPPORT       Therapy Time   Individual Concurrent Group Co-treatment   Time In           Time Out           Minutes                   Gabrielle Kendrick    Electronically signed by Gabrielle Kendrick PT Student, 6/23/2019, 1:29 PM

## 2019-06-23 NOTE — DISCHARGE SUMMARY
Discharge Summary    Faheem Gayle  :  1965  MRN:  [id number]    Admit date:  2019  Discharge date:  19    Admitting Physician:  Felicitas Valencia DO    Advance Directive: Full Code    Consults: neurology    Primary Care Physician:  Raejean Sicard, APRN - CNP    Discharge Diagnoses:  Principal Problem:    Seizures ([address]  Active Problems:    Astrocytoma ([address]    Weakness  Resolved Problems:    Confusion    Facial droop    Dysarthria    Other dysphagia    Gait abnormality      Significant Diagnostic Studies:   No results found. CBC:   Recent Labs     19  0233   WBC 10.1   HGB 11.5*        BMP:    Recent Labs     19      K 4.1      CO2 28   BUN 23*   CREATININE 1.2   GLUCOSE 100     INR: No results for input(s): INR in the last 72 hours. Lipids: No results for input(s): CHOL, HDL in the last 72 hours. Invalid input(s): LDLCALCU  ABGs:No results for input(s): PHART, TBV1WNB, PO2ART, OFY8VGZ, BEART, HGBAE, R7GMOJLO, CARBOXHGBART, 02THERAPY in the last 72 hours. HgBA1c:  No results for input(s): LABA1C in the last 72 hours. Procedures: None  Hospital Course: Mr. Medardo Lynch was admitted on the  because of refractory focal seizures of the left arm and leg. He has a history of an inoperable astrocytoma. Consultation was obtained with neurology. He was started on Lyrica in addition to his other medications. He did have some improvement. On the morning of the  his focal seizures were much less frequent. At this point I felt that he had reached maximal hospital benefit was stable for discharge. Physical Exam:  Vital Signs: /82   Pulse 65   Temp 97.2 °F (36.2 °C) (Temporal)   Resp 18   SpO2 98%   General appearance:. Alert and Cooperative   HEENT: Normocephalic. Chest: clear to auscultation bilaterally without wheezes or rhonchi.   Cardiac: Normal heart tones with regular rate and rhythm, S1, S2 normal. No murmurs, gallops, or rubs auscultated. Abdomen:soft, non-tender; normal bowel sounds, no masses, no organomegaly. Extremities: No clubbing or cyanosis. No peripheral edema. Peripheral pulses palpable. Neurologic: Grossly intact. Discharge Medications:       Laila Balderrama   Home Medication Instructions DZN:[id number]    Printed on:06/23/19 9975   Medication Information                      allopurinol (ZYLOPRIM) 300 MG tablet  Take 300 mg by mouth daily             ALPRAZolam (XANAX) 0.25 MG tablet  Take 0.25 mg by mouth 2 times daily as needed for Sleep. aspirin 81 MG tablet  Take 81 mg by mouth daily             carvedilol (COREG) 25 MG tablet  Take 25 mg by mouth 2 times daily (with meals)             dexamethasone (DECADRON) 2 MG tablet  Take 2 mg by mouth 2 times daily (with meals) Starting 6/21 take 3 tabs by mouth 2 times a day for 3 days, then 1.5 tabs 2 times a day for 10 days             diazepam (DIASTAT) 10 MG GEL  Place 10 mg rectally once as needed (seizure) for up to 1 dose. divalproex (DEPAKOTE) 500 MG DR tablet  Take 1,500 mg by mouth 3 times daily             fenofibrate (TRICOR) 145 MG tablet  Take 145 mg by mouth nightly             lacosamide (VIMPAT) 50 MG TABS tablet  Take 150 mg by mouth every 12 hours. levETIRAcetam (KEPPRA) 500 MG tablet  Take 1,250 mg by mouth 2 times daily             losartan (COZAAR) 100 MG tablet  Take 100 mg by mouth daily             Omega-3 Fatty Acids (FISH OIL) 1000 MG CAPS  Take 2,000 mg by mouth daily (with breakfast)             omeprazole (PRILOSEC) 10 MG delayed release capsule  Take 10 mg by mouth daily             ondansetron (ZOFRAN) 4 MG tablet  Take 1 tablet by mouth daily as needed for Nausea or Vomiting             oxyCODONE-acetaminophen (PERCOCET) 5-325 MG per tablet  Take 1 tablet by mouth every 6 hours as needed for Pain. pregabalin (LYRICA) 150 MG capsule  Take 1 capsule by mouth 2 times daily. QUEtiapine (SEROQUEL) 50 MG tablet  Take 50 mg by mouth nightly             spironolactone (ALDACTONE) 25 MG tablet  Take 25 mg by mouth daily             venlafaxine (EFFEXOR XR) 75 MG extended release capsule  Take 150 mg by mouth daily              vitamin B-6 (PYRIDOXINE) 50 MG tablet  Take 100 mg by mouth daily                 Discharge Instructions: Follow up with VLADIMIR Wolf CNP in 3-5 days. Take medications as directed. Resume activity as tolerated. Diet: DIET GENERAL;     Disposition: Patient is medically stable and will be discharged to home. Time spent on discharge> 30 minutes.     Signed:  Smitha Levine DO

## 2019-06-23 NOTE — PROGRESS NOTES
Patient:   Kleber Hou  MR#:    [id number]   Room:    8879/634-38   YOB: 1965  Date of Progress Note: 6/23/2019  Time of Note                           12:41 PM  Consulting Physician:   Marleen Cool M.D. Attending Physician:  Edrick Halsted, DO     Chief complaint seizures    S:This 49 y.o. male  with complicated history is seen for evaluation of recurrent seizures. The mother provides the history and indicates that about 2 years ago the patient had a seizure and was diagnoses with PRESS. He was also drinking alcohol at that time. He was having some cognitive issues and imbalance over the last 2 years. In April 2019 he was diagnosed with a grade 3 right frontal astrocytoma after undergoing biopsy but was felt to be a non operative candidate due to the location in the right frontal region. Hershell Broach He was having recurrent left facial twitching along with intermittent weakness in the left arm and left leg. He continued to have focal seizures of the left face and arm which despite treatment continued to occur. A few weeks ago he was admitted to [facility] in [place] for recurrent seizures requiring intubation and initiation of comatose state to break the seizures. The mother indicates that he had prolonged period ( 3 days ) before he woke up. He has been placed on multiple antiepileptics  And is currently on Vimpat, Keppra and Depakote at good doses and he continues to have breakthrough focal seizures on the left. One the day of admission the patient had a more violent shaking episode of the left face, arm, torso and leg and went to [facility] ER where he was give more Vimpat and Keppra and transferred to [facility] due to lack of neurology coverage at [facility]. He was also started on chemotherapy and radiation about 2 weeks ago. His code status has no been determined. Doing better. No seizures overnight.         REVIEW OF SYSTEMS:  Constitutional: No fevers No chills  Neck:No stiffness  Respiratory: No shortness of breath  Cardiovascular: No chest pain No palpitations  Gastrointestinal: No abdominal pain    Genitourinary: No Dysuria  Neurological: No headache, no confusion    Past Medical History:      Diagnosis Date    Cancer [facility]     brain    GERD (gastroesophageal reflux disease)     Hyperlipidemia     Hypertension     Seizure ([facility] 07/03/2017       Past Surgical History:      Procedure Laterality Date    COLONOSCOPY      ID SHLDR ARTHROSCOP,SURG,CAPSULORRHAPHY Left 7/2/2018    SHOULDER ARTHROSCOPIC BANKART REPAIR, ANTERIOR, POSTERIOR, AND INFERIOR LABRAL REPAIR performed by Shiela Esteban MD at [facility]       Medications in Hospital:      Current Facility-Administered Medications:     diazepam (DIASTAT) rectal gel 10 mg, 10 mg, Rectal, Once PRN, Rachel Cassette, DO    sodium chloride flush 0.9 % injection 10 mL, 10 mL, Intravenous, 2 times per day, Rachel Cassette, DO, 10 mL at 06/22/19 2120    sodium chloride flush 0.9 % injection 10 mL, 10 mL, Intravenous, PRN, Rachel Cassette, DO, 10 mL at 06/22/19 1317    magnesium hydroxide (MILK OF MAGNESIA) 400 MG/5ML suspension 30 mL, 30 mL, Oral, Daily PRN, Rachel Cassette, DO    ondansetron [facility]) injection 4 mg, 4 mg, Intravenous, Q6H PRN, Rachel Cassette, DO    acetaminophen (TYLENOL) tablet 650 mg, 650 mg, Oral, Q4H PRN, Rachel Cassette, DO    LORazepam (ATIVAN) injection 2 mg, 2 mg, Intravenous, Q4H PRN, Rachel Cassette, DO, 2 mg at 06/22/19 1715    lacosamide (VIMPAT) tablet 200 mg, 200 mg, Oral, BID, Rachel Cassette, DO, 200 mg at 06/23/19 1118    levETIRAcetam (KEPPRA) tablet 1,250 mg, 1,250 mg, Oral, BID, Rachel Cassette, DO, 1,250 mg at 06/23/19 1118    [Held by provider] divalproex (DEPAKOTE) DR tablet 1,500 mg, 1,500 mg, Oral, 3 times per day, Rachel Cassette, DO, Stopped at 06/23/19 0430    dexamethasone (DECADRON) injection 4 mg, 4 mg, Intravenous, Q6H, Kaleigh Diaz Olson, DO, 4 mg at 06/23/19 0553    oxyCODONE-acetaminophen (PERCOCET) 5-325 MG per tablet 1 tablet, 1 tablet, Oral, Q6H PRN, Felicitas Ariasi, DO, 1 tablet at 06/22/19 1004    pregabalin (LYRICA) capsule 150 mg, 150 mg, Oral, BID, Felicitas Alexyhari, DO, 150 mg at 06/23/19 1118    Allergies:  Lisinopril and Lipitor [atorvastatin]    Social History:   TOBACCO:   reports that he quit smoking about 3 years ago. His smoking use included cigarettes. He has a 10.00 pack-year smoking history. He uses smokeless tobacco.  ETOH:   reports that he drinks alcohol. Family History:   No family history on file. PHYSICAL EXAM:  /82   Pulse 65   Temp 97.2 °F (36.2 °C) (Temporal)   Resp 18   SpO2 98%       Constitutional - well developed, well nourished. Eyes - conjunctiva normal.   Ear, nose, throat - No scars, masses, or lesions over external nose or ears, no atrophy of tongue  Neck-symmetric, no masses noted, no jugular vein distension  Respiration- chest wall appears symmetric, good expansion,   normal effort without use of accessory muscles  Musculoskeletal - no significant wasting of muscles noted, no bony deformities  Extremities-no clubbing, cyanosis or edema  Skin - warm, dry, and intact. No rash, erythema, or pallor. Psychiatric - mood, affect, and behavior appear normal.      Neurological exam  Awake, alert, fluent oriented appropriate affect  Attention and concentration appear appropriate  Recent and remote memory appears unremarkable  Speech normal without dysarthria  No clear issues with language of fund of knowledge     Cranial Nerve Exam     CN III, IV,VI-EOMI, No nystagmus, conjugate eye movements, no ptosis    CN VII-no facial assymetry       Motor Exam  dri      Tremors- no tremors in hands or head noted     Gait  Not tested    Nursing/pcp notes, imaging,labs and vitals reviewed.      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 improved  Continues with focal seizures despite multiple antiepileptics in the past, intubation with coma induction and relatively appropriate doses of 3 current antiepileptics   Exam with left sided weakness  CT prior to transfer mass noted with no hemorrhage     DW with patient and mother that most likely we will not be able to stop these seizure     After multiple antiepileptics and current 3 antiepileptic medicine I suspect the chance of resolving his seizures is about 1%     Will add Lyrica to his regimen to see if 4th drug has any benefit  Patient and family to discuss code status     He appears to be a hospice candidate but family wish to continue treatment at this time.     On decadron    Ok to DC home with Diastat for home use for breakthrough seizures    Depakote level 104 ok    Ok to DC from neuro standpoint  Patient to follow up with neurology staff at [address] [facility]  Dr Rehan Pineda

## 2019-06-23 NOTE — OUTREACH NOTE
Medical Week 1 Survey      Responses   Facility patient discharged from?  [facility]   Does the patient have one of the following disease processes/diagnoses(primary or secondary)?  Other   Is there a successful TCM telephone encounter documented?  No   Week 1 attempt successful?  Yes   Call start time  0853   Revoke  Readmitted [Admitted to [facility]. Spoke with daughter.]          Radha Lozada RN

## 2019-06-23 NOTE — PROGRESS NOTES
Dr. Madai Belcher regarding patients effexor.  Electronically signed by Leticia Snyder RN on 6/23/2019 at 11:15 AM

## 2019-06-24 ENCOUNTER — HOSPITAL ENCOUNTER (OUTPATIENT)
Dept: RADIATION ONCOLOGY | Facility: HOSPITAL | Age: 54
Discharge: HOME | End: 2019-06-24

## 2019-06-24 PROCEDURE — 77417 THER RADIOLOGY PORT IMAGE(S): CPT | Performed by: RADIOLOGY

## 2019-06-24 PROCEDURE — 77412 RADIATION TX DELIVERY LVL 3: CPT | Performed by: RADIOLOGY

## 2019-06-25 ENCOUNTER — HOSPITAL ENCOUNTER (OUTPATIENT)
Dept: RADIATION ONCOLOGY | Facility: HOSPITAL | Age: 54
Discharge: HOME | End: 2019-06-25

## 2019-06-25 PROCEDURE — 77412 RADIATION TX DELIVERY LVL 3: CPT | Performed by: RADIOLOGY

## 2019-06-25 RX ORDER — OXYCODONE AND ACETAMINOPHEN 7.5; 325 MG/1; MG/1
1 TABLET ORAL EVERY 4 HOURS PRN
Qty: 40 TABLET | Refills: 0 | Status: SHIPPED | OUTPATIENT
Start: 2019-06-25 | End: 2020-06-04 | Stop reason: DRUGHIGH

## 2019-06-25 RX ORDER — DEXAMETHASONE 4 MG/1
4 TABLET ORAL
Qty: 90 TABLET | Refills: 0 | Status: SHIPPED | OUTPATIENT
Start: 2019-06-25 | End: 2019-07-19 | Stop reason: HOSPADM

## 2019-06-26 ENCOUNTER — HOSPITAL ENCOUNTER (OUTPATIENT)
Dept: INFUSION THERAPY | Age: 54
Discharge: HOME | End: 2019-06-26
Payer: MEDICAID

## 2019-06-26 ENCOUNTER — HOSPITAL ENCOUNTER (OUTPATIENT)
Dept: RADIATION ONCOLOGY | Facility: HOSPITAL | Age: 54
Discharge: HOME | End: 2019-06-26

## 2019-06-26 ENCOUNTER — TRANSITIONAL CARE MANAGEMENT TELEPHONE ENCOUNTER (OUTPATIENT)
Dept: FAMILY MEDICINE CLINIC | Facility: CLINIC | Age: 54
End: 2019-06-26

## 2019-06-26 LAB — LEVETIRACETAM SERPL-MCNC: 17.6 UG/ML (ref 10–40)

## 2019-06-26 PROCEDURE — 85025 COMPLETE CBC W/AUTO DIFF WBC: CPT

## 2019-06-26 PROCEDURE — 36415 COLL VENOUS BLD VENIPUNCTURE: CPT

## 2019-06-26 PROCEDURE — 80053 COMPREHEN METABOLIC PANEL: CPT

## 2019-06-26 PROCEDURE — 77412 RADIATION TX DELIVERY LVL 3: CPT | Performed by: RADIOLOGY

## 2019-06-26 PROCEDURE — 77336 RADIATION PHYSICS CONSULT: CPT | Performed by: RADIOLOGY

## 2019-06-27 ENCOUNTER — HOSPITAL ENCOUNTER (OUTPATIENT)
Dept: RADIATION ONCOLOGY | Facility: HOSPITAL | Age: 54
Discharge: HOME | End: 2019-06-27

## 2019-06-27 ENCOUNTER — OFFICE VISIT (OUTPATIENT)
Dept: FAMILY MEDICINE CLINIC | Facility: CLINIC | Age: 54
End: 2019-06-27

## 2019-06-27 VITALS
HEIGHT: 68 IN | HEART RATE: 93 BPM | TEMPERATURE: 98.2 F | WEIGHT: 196.6 LBS | RESPIRATION RATE: 18 BRPM | OXYGEN SATURATION: 98 % | BODY MASS INDEX: 29.8 KG/M2 | SYSTOLIC BLOOD PRESSURE: 144 MMHG | DIASTOLIC BLOOD PRESSURE: 82 MMHG

## 2019-06-27 DIAGNOSIS — C71.9 ASTROCYTOMA BRAIN TUMOR (HCC): ICD-10-CM

## 2019-06-27 DIAGNOSIS — R56.9 SEIZURES (HCC): ICD-10-CM

## 2019-06-27 DIAGNOSIS — Z09 HOSPITAL DISCHARGE FOLLOW-UP: Primary | ICD-10-CM

## 2019-06-27 PROCEDURE — 77412 RADIATION TX DELIVERY LVL 3: CPT | Performed by: RADIOLOGY

## 2019-06-27 PROCEDURE — 99214 OFFICE O/P EST MOD 30 MIN: CPT | Performed by: NURSE PRACTITIONER

## 2019-06-27 RX ORDER — PREGABALIN 150 MG/1
150 CAPSULE ORAL 2 TIMES DAILY
COMMUNITY
End: 2019-07-03 | Stop reason: HOSPADM

## 2019-06-28 ENCOUNTER — RESULTS ENCOUNTER (OUTPATIENT)
Dept: FAMILY MEDICINE CLINIC | Facility: CLINIC | Age: 54
End: 2019-06-28

## 2019-06-28 ENCOUNTER — TELEPHONE (OUTPATIENT)
Dept: NEUROLOGY | Facility: CLINIC | Age: 54
End: 2019-06-28

## 2019-06-28 ENCOUNTER — HOSPITAL ENCOUNTER (OUTPATIENT)
Dept: RADIATION ONCOLOGY | Facility: HOSPITAL | Age: 54
Discharge: HOME | End: 2019-06-28

## 2019-06-28 DIAGNOSIS — E87.0 HYPERNATREMIA: Primary | ICD-10-CM

## 2019-06-28 DIAGNOSIS — N28.9 ABNORMAL KIDNEY FUNCTION: ICD-10-CM

## 2019-06-28 LAB
ALBUMIN SERPL-MCNC: 3.4 G/DL (ref 3.5–5.2)
ALBUMIN/GLOB SERPL: 1.3 G/DL
ALP SERPL-CCNC: 66 U/L (ref 39–117)
ALT SERPL-CCNC: 184 U/L (ref 1–41)
AST SERPL-CCNC: 50 U/L (ref 1–40)
BILIRUB SERPL-MCNC: 0.2 MG/DL (ref 0.2–1.2)
BUN SERPL-MCNC: 20 MG/DL (ref 6–20)
BUN/CREAT SERPL: 15 (ref 7–25)
CALCIUM SERPL-MCNC: 9.3 MG/DL (ref 8.6–10.5)
CHLORIDE SERPL-SCNC: 108 MMOL/L (ref 98–107)
CO2 SERPL-SCNC: 23.8 MMOL/L (ref 22–29)
CREAT SERPL-MCNC: 1.33 MG/DL (ref 0.76–1.27)
GLOBULIN SER CALC-MCNC: 2.7 GM/DL
GLUCOSE SERPL-MCNC: 147 MG/DL (ref 65–99)
POTASSIUM SERPL-SCNC: 4.2 MMOL/L (ref 3.5–5.2)
PROT SERPL-MCNC: 6.1 G/DL (ref 6–8.5)
SODIUM SERPL-SCNC: 146 MMOL/L (ref 136–145)

## 2019-06-28 PROCEDURE — 77412 RADIATION TX DELIVERY LVL 3: CPT | Performed by: RADIOLOGY

## 2019-06-28 NOTE — TELEPHONE ENCOUNTER
Ivonne said Lukasz's seizures have improved since he started taking Lyrica.  He isn't always rational and he is losing the use of his left arm and leg.  He has an appointment scheduled with [facility] 7-10-19.

## 2019-06-29 ENCOUNTER — HOSPITAL ENCOUNTER (INPATIENT)
Facility: HOSPITAL | Age: 54
LOS: 4 days | Discharge: HOME HEALTH | End: 2019-07-03
Attending: EMERGENCY MEDICINE | Admitting: INTERNAL MEDICINE

## 2019-06-29 ENCOUNTER — APPOINTMENT (OUTPATIENT)
Dept: CT IMAGING | Facility: HOSPITAL | Age: 54
End: 2019-06-29

## 2019-06-29 DIAGNOSIS — R41.82 ALTERED MENTAL STATUS, UNSPECIFIED ALTERED MENTAL STATUS TYPE: ICD-10-CM

## 2019-06-29 DIAGNOSIS — C71.9 ANAPLASTIC GLIOMA OF BRAIN (HCC): Chronic | ICD-10-CM

## 2019-06-29 DIAGNOSIS — C71.9 ASTROCYTOMA BRAIN TUMOR (HCC): ICD-10-CM

## 2019-06-29 DIAGNOSIS — Z74.09 IMPAIRED FUNCTIONAL MOBILITY, BALANCE, GAIT, AND ENDURANCE: ICD-10-CM

## 2019-06-29 DIAGNOSIS — R56.9 SEIZURES (HCC): ICD-10-CM

## 2019-06-29 DIAGNOSIS — E16.2 HYPOGLYCEMIA: Primary | ICD-10-CM

## 2019-06-29 DIAGNOSIS — D49.6 BRAIN TUMOR (HCC): ICD-10-CM

## 2019-06-29 DIAGNOSIS — G81.94 LEFT HEMIPARESIS (HCC): ICD-10-CM

## 2019-06-29 DIAGNOSIS — R13.10 DYSPHAGIA, UNSPECIFIED TYPE: ICD-10-CM

## 2019-06-29 LAB
ALBUMIN SERPL-MCNC: 3.5 G/DL (ref 3.5–5)
ALBUMIN/GLOB SERPL: 1.2 G/DL (ref 1.1–2.5)
ALP SERPL-CCNC: 51 U/L (ref 24–120)
ALT SERPL W P-5'-P-CCNC: 173 U/L (ref 0–54)
ANION GAP SERPL CALCULATED.3IONS-SCNC: 11 MMOL/L (ref 4–13)
AST SERPL-CCNC: 68 U/L (ref 7–45)
BASOPHILS # BLD AUTO: 0.07 10*3/MM3 (ref 0–0.2)
BASOPHILS NFR BLD AUTO: 0.7 % (ref 0–2)
BILIRUB SERPL-MCNC: 0.4 MG/DL (ref 0.1–1)
BUN BLD-MCNC: 33 MG/DL (ref 5–21)
BUN/CREAT SERPL: 24.1 (ref 7–25)
CALCIUM SPEC-SCNC: 8.9 MG/DL (ref 8.4–10.4)
CHLORIDE SERPL-SCNC: 108 MMOL/L (ref 98–110)
CO2 SERPL-SCNC: 23 MMOL/L (ref 24–31)
CREAT BLD-MCNC: 1.37 MG/DL (ref 0.5–1.4)
DEPRECATED RDW RBC AUTO: 53.3 FL (ref 40–54)
EOSINOPHIL # BLD AUTO: 0 10*3/MM3 (ref 0–0.7)
EOSINOPHIL NFR BLD AUTO: 0 % (ref 0–4)
ERYTHROCYTE [DISTWIDTH] IN BLOOD BY AUTOMATED COUNT: 16.9 % (ref 12–15)
GFR SERPL CREATININE-BSD FRML MDRD: 54 ML/MIN/1.73
GLOBULIN UR ELPH-MCNC: 2.9 GM/DL
GLUCOSE BLD-MCNC: 131 MG/DL (ref 70–100)
GLUCOSE BLDC GLUCOMTR-MCNC: 102 MG/DL (ref 70–130)
GLUCOSE BLDC GLUCOMTR-MCNC: 145 MG/DL (ref 70–130)
GLUCOSE BLDC GLUCOMTR-MCNC: 69 MG/DL (ref 70–130)
GLUCOSE BLDC GLUCOMTR-MCNC: 71 MG/DL (ref 70–130)
HCT VFR BLD AUTO: 36.5 % (ref 40–52)
HGB BLD-MCNC: 12 G/DL (ref 14–18)
HOLD SPECIMEN: NORMAL
HOLD SPECIMEN: NORMAL
IMM GRANULOCYTES # BLD AUTO: 0.38 10*3/MM3 (ref 0–0.05)
IMM GRANULOCYTES NFR BLD AUTO: 4 % (ref 0–5)
LYMPHOCYTES # BLD AUTO: 2.71 10*3/MM3 (ref 0.72–4.86)
LYMPHOCYTES NFR BLD AUTO: 28.3 % (ref 15–45)
MCH RBC QN AUTO: 28.6 PG (ref 28–32)
MCHC RBC AUTO-ENTMCNC: 32.9 G/DL (ref 33–36)
MCV RBC AUTO: 86.9 FL (ref 82–95)
MONOCYTES # BLD AUTO: 0.46 10*3/MM3 (ref 0.19–1.3)
MONOCYTES NFR BLD AUTO: 4.8 % (ref 4–12)
NEUTROPHILS # BLD AUTO: 5.97 10*3/MM3 (ref 1.87–8.4)
NEUTROPHILS NFR BLD AUTO: 62.2 % (ref 39–78)
NRBC BLD AUTO-RTO: 0.2 /100 WBC (ref 0–0.2)
PLATELET # BLD AUTO: 308 10*3/MM3 (ref 130–400)
PMV BLD AUTO: 9.5 FL (ref 6–12)
POTASSIUM BLD-SCNC: 4.1 MMOL/L (ref 3.5–5.3)
PROT SERPL-MCNC: 6.4 G/DL (ref 6.3–8.7)
RBC # BLD AUTO: 4.2 10*6/MM3 (ref 4.8–5.9)
SODIUM BLD-SCNC: 142 MMOL/L (ref 135–145)
VALPROATE SERPL-MCNC: 93.3 MCG/ML (ref 50–100)
WBC NRBC COR # BLD: 9.59 10*3/MM3 (ref 4.8–10.8)

## 2019-06-29 PROCEDURE — 80164 ASSAY DIPROPYLACETIC ACD TOT: CPT | Performed by: EMERGENCY MEDICINE

## 2019-06-29 PROCEDURE — 80053 COMPREHEN METABOLIC PANEL: CPT | Performed by: EMERGENCY MEDICINE

## 2019-06-29 PROCEDURE — 25010000003 LEVETIRACETAM IN NACL 0.75% 1000 MG/100ML SOLUTION: Performed by: EMERGENCY MEDICINE

## 2019-06-29 PROCEDURE — 25010000002 ONDANSETRON PER 1 MG: Performed by: FAMILY MEDICINE

## 2019-06-29 PROCEDURE — 93010 ELECTROCARDIOGRAM REPORT: CPT | Performed by: INTERNAL MEDICINE

## 2019-06-29 PROCEDURE — 70450 CT HEAD/BRAIN W/O DYE: CPT

## 2019-06-29 PROCEDURE — 94760 N-INVAS EAR/PLS OXIMETRY 1: CPT

## 2019-06-29 PROCEDURE — 92610 EVALUATE SWALLOWING FUNCTION: CPT

## 2019-06-29 PROCEDURE — 99285 EMERGENCY DEPT VISIT HI MDM: CPT

## 2019-06-29 PROCEDURE — 63710000001 DEXAMETHASONE PER 0.25 MG: Performed by: FAMILY MEDICINE

## 2019-06-29 PROCEDURE — 82962 GLUCOSE BLOOD TEST: CPT

## 2019-06-29 PROCEDURE — 94799 UNLISTED PULMONARY SVC/PX: CPT

## 2019-06-29 PROCEDURE — 93005 ELECTROCARDIOGRAM TRACING: CPT | Performed by: EMERGENCY MEDICINE

## 2019-06-29 PROCEDURE — 99232 SBSQ HOSP IP/OBS MODERATE 35: CPT | Performed by: PSYCHIATRY & NEUROLOGY

## 2019-06-29 PROCEDURE — 85025 COMPLETE CBC W/AUTO DIFF WBC: CPT | Performed by: EMERGENCY MEDICINE

## 2019-06-29 PROCEDURE — 25010000002 DEXAMETHASONE PER 1 MG: Performed by: EMERGENCY MEDICINE

## 2019-06-29 RX ORDER — PREGABALIN 75 MG/1
150 CAPSULE ORAL EVERY 12 HOURS SCHEDULED
Status: DISCONTINUED | OUTPATIENT
Start: 2019-06-29 | End: 2019-06-30

## 2019-06-29 RX ORDER — PROMETHAZINE HYDROCHLORIDE 12.5 MG/1
12.5 TABLET ORAL EVERY 4 HOURS PRN
COMMUNITY
End: 2019-07-22 | Stop reason: SDUPTHER

## 2019-06-29 RX ORDER — TEMOZOLOMIDE 5 MG/1
10 CAPSULE ORAL DAILY
Status: DISCONTINUED | OUTPATIENT
Start: 2019-06-29 | End: 2019-06-29 | Stop reason: SDUPTHER

## 2019-06-29 RX ORDER — DIVALPROEX SODIUM 500 MG/1
1500 TABLET, DELAYED RELEASE ORAL EVERY 8 HOURS SCHEDULED
Status: DISCONTINUED | OUTPATIENT
Start: 2019-06-30 | End: 2019-06-29

## 2019-06-29 RX ORDER — DEXAMETHASONE 4 MG/1
4 TABLET ORAL
Status: DISCONTINUED | OUTPATIENT
Start: 2019-06-29 | End: 2019-07-03 | Stop reason: HOSPADM

## 2019-06-29 RX ORDER — ASPIRIN 81 MG/1
81 TABLET, CHEWABLE ORAL DAILY
Status: DISCONTINUED | OUTPATIENT
Start: 2019-06-30 | End: 2019-07-03 | Stop reason: HOSPADM

## 2019-06-29 RX ORDER — DIVALPROEX SODIUM 125 MG/1
1500 CAPSULE, COATED PELLETS ORAL EVERY 8 HOURS SCHEDULED
Status: DISCONTINUED | OUTPATIENT
Start: 2019-06-29 | End: 2019-07-03 | Stop reason: HOSPADM

## 2019-06-29 RX ORDER — TEMOZOLOMIDE 140 MG/1
140 CAPSULE ORAL NIGHTLY
Status: DISCONTINUED | OUTPATIENT
Start: 2019-06-29 | End: 2019-07-03 | Stop reason: HOSPADM

## 2019-06-29 RX ORDER — BISACODYL 10 MG
10 SUPPOSITORY, RECTAL RECTAL DAILY PRN
Status: DISCONTINUED | OUTPATIENT
Start: 2019-06-29 | End: 2019-07-03 | Stop reason: HOSPADM

## 2019-06-29 RX ORDER — DEXTROSE, SODIUM CHLORIDE, AND POTASSIUM CHLORIDE 5; .45; .15 G/100ML; G/100ML; G/100ML
125 INJECTION INTRAVENOUS CONTINUOUS
Status: DISCONTINUED | OUTPATIENT
Start: 2019-06-29 | End: 2019-06-30

## 2019-06-29 RX ORDER — SODIUM CHLORIDE 0.9 % (FLUSH) 0.9 %
3-10 SYRINGE (ML) INJECTION AS NEEDED
Status: DISCONTINUED | OUTPATIENT
Start: 2019-06-29 | End: 2019-07-03 | Stop reason: HOSPADM

## 2019-06-29 RX ORDER — LACOSAMIDE 50 MG/1
150 TABLET ORAL EVERY 12 HOURS SCHEDULED
Status: DISCONTINUED | OUTPATIENT
Start: 2019-06-29 | End: 2019-07-03 | Stop reason: HOSPADM

## 2019-06-29 RX ORDER — FAMOTIDINE 10 MG/ML
20 INJECTION, SOLUTION INTRAVENOUS EVERY 12 HOURS SCHEDULED
Status: DISCONTINUED | OUTPATIENT
Start: 2019-06-29 | End: 2019-07-01 | Stop reason: ALTCHOICE

## 2019-06-29 RX ORDER — SULFAMETHOXAZOLE AND TRIMETHOPRIM 800; 160 MG/1; MG/1
1 TABLET ORAL 3 TIMES WEEKLY
COMMUNITY
Start: 2019-07-09 | End: 2019-07-19 | Stop reason: HOSPADM

## 2019-06-29 RX ORDER — TEMOZOLOMIDE 5 MG/1
10 CAPSULE ORAL NIGHTLY
Status: DISCONTINUED | OUTPATIENT
Start: 2019-06-29 | End: 2019-07-03 | Stop reason: HOSPADM

## 2019-06-29 RX ORDER — NICOTINE 21 MG/24HR
1 PATCH, TRANSDERMAL 24 HOURS TRANSDERMAL
Status: DISCONTINUED | OUTPATIENT
Start: 2019-06-29 | End: 2019-07-03 | Stop reason: HOSPADM

## 2019-06-29 RX ORDER — DIVALPROEX SODIUM 500 MG/1
1500 TABLET, DELAYED RELEASE ORAL EVERY 8 HOURS SCHEDULED
Status: DISCONTINUED | OUTPATIENT
Start: 2019-06-29 | End: 2019-06-29

## 2019-06-29 RX ORDER — SODIUM CHLORIDE 0.9 % (FLUSH) 0.9 %
10 SYRINGE (ML) INJECTION AS NEEDED
Status: DISCONTINUED | OUTPATIENT
Start: 2019-06-29 | End: 2019-07-03 | Stop reason: HOSPADM

## 2019-06-29 RX ORDER — DEXAMETHASONE SODIUM PHOSPHATE 10 MG/ML
10 INJECTION INTRAMUSCULAR; INTRAVENOUS ONCE
Status: COMPLETED | OUTPATIENT
Start: 2019-06-29 | End: 2019-06-29

## 2019-06-29 RX ORDER — LEVETIRACETAM 10 MG/ML
1000 INJECTION INTRAVASCULAR ONCE
Status: COMPLETED | OUTPATIENT
Start: 2019-06-29 | End: 2019-06-29

## 2019-06-29 RX ORDER — NALOXONE HCL 0.4 MG/ML
0.4 VIAL (ML) INJECTION
Status: DISCONTINUED | OUTPATIENT
Start: 2019-06-29 | End: 2019-07-03 | Stop reason: HOSPADM

## 2019-06-29 RX ORDER — SODIUM CHLORIDE 0.9 % (FLUSH) 0.9 %
3 SYRINGE (ML) INJECTION EVERY 12 HOURS SCHEDULED
Status: DISCONTINUED | OUTPATIENT
Start: 2019-06-29 | End: 2019-07-03 | Stop reason: HOSPADM

## 2019-06-29 RX ORDER — MORPHINE SULFATE 2 MG/ML
1 INJECTION, SOLUTION INTRAMUSCULAR; INTRAVENOUS EVERY 4 HOURS PRN
Status: DISCONTINUED | OUTPATIENT
Start: 2019-06-29 | End: 2019-07-02 | Stop reason: RX

## 2019-06-29 RX ORDER — ONDANSETRON 2 MG/ML
4 INJECTION INTRAMUSCULAR; INTRAVENOUS EVERY 6 HOURS PRN
Status: DISCONTINUED | OUTPATIENT
Start: 2019-06-29 | End: 2019-07-03 | Stop reason: HOSPADM

## 2019-06-29 RX ORDER — SODIUM CHLORIDE 9 MG/ML
100 INJECTION, SOLUTION INTRAVENOUS CONTINUOUS
Status: DISCONTINUED | OUTPATIENT
Start: 2019-06-29 | End: 2019-06-30

## 2019-06-29 RX ORDER — ALPRAZOLAM 0.25 MG/1
0.25 TABLET ORAL 2 TIMES DAILY PRN
Status: DISCONTINUED | OUTPATIENT
Start: 2019-06-29 | End: 2019-07-03 | Stop reason: HOSPADM

## 2019-06-29 RX ORDER — VENLAFAXINE HYDROCHLORIDE 75 MG/1
150 CAPSULE, EXTENDED RELEASE ORAL DAILY
Status: DISCONTINUED | OUTPATIENT
Start: 2019-06-30 | End: 2019-07-03 | Stop reason: HOSPADM

## 2019-06-29 RX ORDER — QUETIAPINE FUMARATE 25 MG/1
50 TABLET, FILM COATED ORAL NIGHTLY
Status: DISCONTINUED | OUTPATIENT
Start: 2019-06-29 | End: 2019-07-03 | Stop reason: HOSPADM

## 2019-06-29 RX ORDER — DIVALPROEX SODIUM 125 MG/1
1500 CAPSULE, COATED PELLETS ORAL 3 TIMES DAILY
Status: DISCONTINUED | OUTPATIENT
Start: 2019-06-29 | End: 2019-06-29

## 2019-06-29 RX ADMIN — NICOTINE 1 PATCH: 21 PATCH, EXTENDED RELEASE TRANSDERMAL at 18:20

## 2019-06-29 RX ADMIN — LEVETIRACETAM 1000 MG: 10 INJECTION INTRAVENOUS at 10:45

## 2019-06-29 RX ADMIN — DEXAMETHASONE 4 MG: 4 TABLET ORAL at 18:20

## 2019-06-29 RX ADMIN — DIVALPROEX SODIUM 1500 MG: 125 CAPSULE, COATED PELLETS ORAL at 17:34

## 2019-06-29 RX ADMIN — POTASSIUM CHLORIDE, DEXTROSE MONOHYDRATE AND SODIUM CHLORIDE 125 ML/HR: 150; 5; 450 INJECTION, SOLUTION INTRAVENOUS at 12:34

## 2019-06-29 RX ADMIN — LEVETIRACETAM 1250 MG: 500 TABLET, FILM COATED ORAL at 20:44

## 2019-06-29 RX ADMIN — PREGABALIN 150 MG: 75 CAPSULE ORAL at 20:44

## 2019-06-29 RX ADMIN — LACOSAMIDE 150 MG: 50 TABLET, FILM COATED ORAL at 20:49

## 2019-06-29 RX ADMIN — FAMOTIDINE 20 MG: 10 INJECTION, SOLUTION INTRAVENOUS at 20:45

## 2019-06-29 RX ADMIN — SODIUM CHLORIDE, PRESERVATIVE FREE 3 ML: 5 INJECTION INTRAVENOUS at 20:49

## 2019-06-29 RX ADMIN — QUETIAPINE FUMARATE 50 MG: 25 TABLET, FILM COATED ORAL at 20:44

## 2019-06-29 RX ADMIN — TEMOZOLOMIDE 10 MG: 5 CAPSULE ORAL at 20:46

## 2019-06-29 RX ADMIN — ONDANSETRON 4 MG: 2 INJECTION, SOLUTION INTRAMUSCULAR; INTRAVENOUS at 20:45

## 2019-06-29 RX ADMIN — ALPRAZOLAM 0.25 MG: 0.25 TABLET ORAL at 15:17

## 2019-06-29 RX ADMIN — TEMOZOLOMIDE 140 MG: 140 CAPSULE ORAL at 20:46

## 2019-06-29 RX ADMIN — DEXAMETHASONE SODIUM PHOSPHATE 10 MG: 10 INJECTION INTRAMUSCULAR; INTRAVENOUS at 10:45

## 2019-06-30 LAB
ALBUMIN SERPL-MCNC: 3.5 G/DL (ref 3.5–5)
ALBUMIN/GLOB SERPL: 1.2 G/DL (ref 1.1–2.5)
ALP SERPL-CCNC: 54 U/L (ref 24–120)
ALT SERPL W P-5'-P-CCNC: 157 U/L (ref 0–54)
AMMONIA BLD-SCNC: 154 UMOL/L (ref 9–33)
AMMONIA BLD-SCNC: 37 UMOL/L (ref 9–33)
ANION GAP SERPL CALCULATED.3IONS-SCNC: 9 MMOL/L (ref 4–13)
AST SERPL-CCNC: 54 U/L (ref 7–45)
BASOPHILS # BLD AUTO: 0.06 10*3/MM3 (ref 0–0.2)
BASOPHILS NFR BLD AUTO: 0.6 % (ref 0–2)
BILIRUB SERPL-MCNC: 0.5 MG/DL (ref 0.1–1)
BUN BLD-MCNC: 32 MG/DL (ref 5–21)
BUN/CREAT SERPL: 25.6 (ref 7–25)
CALCIUM SPEC-SCNC: 8.8 MG/DL (ref 8.4–10.4)
CHLORIDE SERPL-SCNC: 111 MMOL/L (ref 98–110)
CO2 SERPL-SCNC: 21 MMOL/L (ref 24–31)
CREAT BLD-MCNC: 1.25 MG/DL (ref 0.5–1.4)
DEPRECATED RDW RBC AUTO: 49.6 FL (ref 40–54)
EOSINOPHIL # BLD AUTO: 0.01 10*3/MM3 (ref 0–0.7)
EOSINOPHIL NFR BLD AUTO: 0.1 % (ref 0–4)
ERYTHROCYTE [DISTWIDTH] IN BLOOD BY AUTOMATED COUNT: 16.2 % (ref 12–15)
GFR SERPL CREATININE-BSD FRML MDRD: 60 ML/MIN/1.73
GLOBULIN UR ELPH-MCNC: 2.9 GM/DL
GLUCOSE BLD-MCNC: 70 MG/DL (ref 70–100)
GLUCOSE BLDC GLUCOMTR-MCNC: 103 MG/DL (ref 70–130)
GLUCOSE BLDC GLUCOMTR-MCNC: 109 MG/DL (ref 70–130)
GLUCOSE BLDC GLUCOMTR-MCNC: 83 MG/DL (ref 70–130)
GLUCOSE BLDC GLUCOMTR-MCNC: 85 MG/DL (ref 70–130)
HCT VFR BLD AUTO: 39.9 % (ref 40–52)
HGB BLD-MCNC: 13.4 G/DL (ref 14–18)
IMM GRANULOCYTES # BLD AUTO: 0.32 10*3/MM3 (ref 0–0.05)
IMM GRANULOCYTES NFR BLD AUTO: 3.3 % (ref 0–5)
LYMPHOCYTES # BLD AUTO: 3.06 10*3/MM3 (ref 0.72–4.86)
LYMPHOCYTES NFR BLD AUTO: 31.6 % (ref 15–45)
MCH RBC QN AUTO: 28.9 PG (ref 28–32)
MCHC RBC AUTO-ENTMCNC: 33.6 G/DL (ref 33–36)
MCV RBC AUTO: 86.2 FL (ref 82–95)
MONOCYTES # BLD AUTO: 0.53 10*3/MM3 (ref 0.19–1.3)
MONOCYTES NFR BLD AUTO: 5.5 % (ref 4–12)
NEUTROPHILS # BLD AUTO: 5.7 10*3/MM3 (ref 1.87–8.4)
NEUTROPHILS NFR BLD AUTO: 58.9 % (ref 39–78)
NRBC BLD AUTO-RTO: 0 /100 WBC (ref 0–0.2)
PLATELET # BLD AUTO: 276 10*3/MM3 (ref 130–400)
PMV BLD AUTO: 9.7 FL (ref 6–12)
POTASSIUM BLD-SCNC: 4.5 MMOL/L (ref 3.5–5.3)
PROT SERPL-MCNC: 6.4 G/DL (ref 6.3–8.7)
RBC # BLD AUTO: 4.63 10*6/MM3 (ref 4.8–5.9)
SODIUM BLD-SCNC: 141 MMOL/L (ref 135–145)
WBC NRBC COR # BLD: 9.68 10*3/MM3 (ref 4.8–10.8)

## 2019-06-30 PROCEDURE — 80053 COMPREHEN METABOLIC PANEL: CPT | Performed by: FAMILY MEDICINE

## 2019-06-30 PROCEDURE — 82140 ASSAY OF AMMONIA: CPT | Performed by: FAMILY MEDICINE

## 2019-06-30 PROCEDURE — 82140 ASSAY OF AMMONIA: CPT | Performed by: PSYCHIATRY & NEUROLOGY

## 2019-06-30 PROCEDURE — 82962 GLUCOSE BLOOD TEST: CPT

## 2019-06-30 PROCEDURE — 63710000001 DEXAMETHASONE PER 0.25 MG: Performed by: FAMILY MEDICINE

## 2019-06-30 PROCEDURE — 99233 SBSQ HOSP IP/OBS HIGH 50: CPT | Performed by: PSYCHIATRY & NEUROLOGY

## 2019-06-30 PROCEDURE — 92610 EVALUATE SWALLOWING FUNCTION: CPT

## 2019-06-30 PROCEDURE — 94760 N-INVAS EAR/PLS OXIMETRY 1: CPT

## 2019-06-30 PROCEDURE — 85025 COMPLETE CBC W/AUTO DIFF WBC: CPT | Performed by: FAMILY MEDICINE

## 2019-06-30 PROCEDURE — 94799 UNLISTED PULMONARY SVC/PX: CPT

## 2019-06-30 RX ORDER — PREGABALIN 100 MG/1
100 CAPSULE ORAL EVERY 12 HOURS SCHEDULED
Status: DISCONTINUED | OUTPATIENT
Start: 2019-06-30 | End: 2019-07-03 | Stop reason: HOSPADM

## 2019-06-30 RX ADMIN — DEXAMETHASONE 4 MG: 4 TABLET ORAL at 18:02

## 2019-06-30 RX ADMIN — TEMOZOLOMIDE 140 MG: 140 CAPSULE ORAL at 23:07

## 2019-06-30 RX ADMIN — LEVETIRACETAM 1250 MG: 500 TABLET, FILM COATED ORAL at 23:05

## 2019-06-30 RX ADMIN — DIVALPROEX SODIUM 1500 MG: 125 CAPSULE, COATED PELLETS ORAL at 15:11

## 2019-06-30 RX ADMIN — DEXAMETHASONE 4 MG: 4 TABLET ORAL at 08:24

## 2019-06-30 RX ADMIN — NICOTINE 1 PATCH: 21 PATCH, EXTENDED RELEASE TRANSDERMAL at 18:02

## 2019-06-30 RX ADMIN — SODIUM CHLORIDE 100 ML/HR: 9 INJECTION, SOLUTION INTRAVENOUS at 05:33

## 2019-06-30 RX ADMIN — FAMOTIDINE 20 MG: 10 INJECTION, SOLUTION INTRAVENOUS at 08:24

## 2019-06-30 RX ADMIN — ALPRAZOLAM 0.25 MG: 0.25 TABLET ORAL at 10:37

## 2019-06-30 RX ADMIN — FAMOTIDINE 20 MG: 10 INJECTION, SOLUTION INTRAVENOUS at 23:14

## 2019-06-30 RX ADMIN — PREGABALIN 150 MG: 75 CAPSULE ORAL at 08:24

## 2019-06-30 RX ADMIN — LACOSAMIDE 150 MG: 50 TABLET, FILM COATED ORAL at 23:04

## 2019-06-30 RX ADMIN — ALPRAZOLAM 0.25 MG: 0.25 TABLET ORAL at 23:03

## 2019-06-30 RX ADMIN — ASPIRIN 81 MG: 81 TABLET, CHEWABLE ORAL at 08:24

## 2019-06-30 RX ADMIN — DIVALPROEX SODIUM 1500 MG: 125 CAPSULE, COATED PELLETS ORAL at 23:04

## 2019-06-30 RX ADMIN — PREGABALIN 100 MG: 100 CAPSULE ORAL at 21:19

## 2019-06-30 RX ADMIN — DEXAMETHASONE 4 MG: 4 TABLET ORAL at 11:47

## 2019-06-30 RX ADMIN — QUETIAPINE FUMARATE 50 MG: 25 TABLET, FILM COATED ORAL at 23:05

## 2019-06-30 RX ADMIN — LEVETIRACETAM 1250 MG: 500 TABLET, FILM COATED ORAL at 08:24

## 2019-06-30 RX ADMIN — SODIUM CHLORIDE, PRESERVATIVE FREE 3 ML: 5 INJECTION INTRAVENOUS at 21:18

## 2019-06-30 RX ADMIN — DIVALPROEX SODIUM 1500 MG: 125 CAPSULE, COATED PELLETS ORAL at 00:03

## 2019-06-30 RX ADMIN — TEMOZOLOMIDE 10 MG: 5 CAPSULE ORAL at 23:07

## 2019-06-30 RX ADMIN — DIVALPROEX SODIUM 1500 MG: 125 CAPSULE, COATED PELLETS ORAL at 06:22

## 2019-06-30 RX ADMIN — VENLAFAXINE HYDROCHLORIDE 150 MG: 75 CAPSULE, EXTENDED RELEASE ORAL at 08:24

## 2019-06-30 RX ADMIN — LACOSAMIDE 150 MG: 50 TABLET, FILM COATED ORAL at 08:24

## 2019-07-01 ENCOUNTER — TELEPHONE (OUTPATIENT)
Dept: FAMILY MEDICINE CLINIC | Facility: CLINIC | Age: 54
End: 2019-07-01

## 2019-07-01 DIAGNOSIS — R56.9 SEIZURES (HCC): Primary | ICD-10-CM

## 2019-07-01 DIAGNOSIS — C71.9 ASTROCYTOMA BRAIN TUMOR (HCC): ICD-10-CM

## 2019-07-01 PROBLEM — R74.01 ELEVATED TRANSAMINASE LEVEL: Status: ACTIVE | Noted: 2019-07-01

## 2019-07-01 LAB
GLUCOSE BLDC GLUCOMTR-MCNC: 74 MG/DL (ref 70–130)
GLUCOSE BLDC GLUCOMTR-MCNC: 87 MG/DL (ref 70–130)

## 2019-07-01 PROCEDURE — 99233 SBSQ HOSP IP/OBS HIGH 50: CPT | Performed by: PSYCHIATRY & NEUROLOGY

## 2019-07-01 PROCEDURE — 99222 1ST HOSP IP/OBS MODERATE 55: CPT | Performed by: CLINICAL NURSE SPECIALIST

## 2019-07-01 PROCEDURE — 77412 RADIATION TX DELIVERY LVL 3: CPT | Performed by: RADIOLOGY

## 2019-07-01 PROCEDURE — 94799 UNLISTED PULMONARY SVC/PX: CPT

## 2019-07-01 PROCEDURE — 94760 N-INVAS EAR/PLS OXIMETRY 1: CPT

## 2019-07-01 PROCEDURE — 77417 THER RADIOLOGY PORT IMAGE(S): CPT | Performed by: RADIOLOGY

## 2019-07-01 PROCEDURE — 63710000001 DEXAMETHASONE PER 0.25 MG: Performed by: FAMILY MEDICINE

## 2019-07-01 PROCEDURE — 82962 GLUCOSE BLOOD TEST: CPT

## 2019-07-01 PROCEDURE — 99497 ADVNCD CARE PLAN 30 MIN: CPT | Performed by: CLINICAL NURSE SPECIALIST

## 2019-07-01 RX ORDER — FAMOTIDINE 20 MG/1
20 TABLET, FILM COATED ORAL
Status: DISCONTINUED | OUTPATIENT
Start: 2019-07-01 | End: 2019-07-03 | Stop reason: HOSPADM

## 2019-07-01 RX ORDER — LACTULOSE 20 G/30ML
20 SOLUTION ORAL DAILY
Status: DISCONTINUED | OUTPATIENT
Start: 2019-07-01 | End: 2019-07-02

## 2019-07-01 RX ADMIN — SODIUM CHLORIDE, PRESERVATIVE FREE 3 ML: 5 INJECTION INTRAVENOUS at 08:11

## 2019-07-01 RX ADMIN — PREGABALIN 100 MG: 100 CAPSULE ORAL at 08:10

## 2019-07-01 RX ADMIN — LACOSAMIDE 150 MG: 50 TABLET, FILM COATED ORAL at 08:36

## 2019-07-01 RX ADMIN — ASPIRIN 81 MG: 81 TABLET, CHEWABLE ORAL at 08:10

## 2019-07-01 RX ADMIN — PREGABALIN 100 MG: 100 CAPSULE ORAL at 21:13

## 2019-07-01 RX ADMIN — SODIUM CHLORIDE, PRESERVATIVE FREE 3 ML: 5 INJECTION INTRAVENOUS at 21:20

## 2019-07-01 RX ADMIN — LEVETIRACETAM 1250 MG: 500 TABLET, FILM COATED ORAL at 21:13

## 2019-07-01 RX ADMIN — TEMOZOLOMIDE 140 MG: 140 CAPSULE ORAL at 21:14

## 2019-07-01 RX ADMIN — TEMOZOLOMIDE 10 MG: 5 CAPSULE ORAL at 21:14

## 2019-07-01 RX ADMIN — NICOTINE 1 PATCH: 21 PATCH, EXTENDED RELEASE TRANSDERMAL at 17:49

## 2019-07-01 RX ADMIN — DEXAMETHASONE 4 MG: 4 TABLET ORAL at 17:49

## 2019-07-01 RX ADMIN — DEXAMETHASONE 4 MG: 4 TABLET ORAL at 08:01

## 2019-07-01 RX ADMIN — DEXAMETHASONE 4 MG: 4 TABLET ORAL at 13:00

## 2019-07-01 RX ADMIN — QUETIAPINE FUMARATE 50 MG: 25 TABLET, FILM COATED ORAL at 21:14

## 2019-07-01 RX ADMIN — FAMOTIDINE 20 MG: 20 TABLET, FILM COATED ORAL at 17:49

## 2019-07-01 RX ADMIN — DIVALPROEX SODIUM 1375 MG: 125 CAPSULE, COATED PELLETS ORAL at 21:11

## 2019-07-01 RX ADMIN — DIVALPROEX SODIUM 1500 MG: 125 CAPSULE, COATED PELLETS ORAL at 08:01

## 2019-07-01 RX ADMIN — DIVALPROEX SODIUM 1500 MG: 125 CAPSULE, COATED PELLETS ORAL at 13:00

## 2019-07-01 RX ADMIN — LACTULOSE 20 G: 20 SOLUTION ORAL at 13:00

## 2019-07-01 RX ADMIN — FAMOTIDINE 20 MG: 10 INJECTION, SOLUTION INTRAVENOUS at 08:10

## 2019-07-01 RX ADMIN — LACOSAMIDE 150 MG: 50 TABLET, FILM COATED ORAL at 21:13

## 2019-07-01 RX ADMIN — VENLAFAXINE HYDROCHLORIDE 150 MG: 75 CAPSULE, EXTENDED RELEASE ORAL at 08:10

## 2019-07-01 RX ADMIN — LEVETIRACETAM 1250 MG: 500 TABLET, FILM COATED ORAL at 08:10

## 2019-07-01 NOTE — PLAN OF CARE
"Problem: Skin Injury Risk (Adult)  Goal: Identify Related Risk Factors and Signs and Symptoms  Outcome: Outcome(s) achieved Date Met: 07/01/19    Goal: Skin Health and Integrity  Outcome: Ongoing (interventions implemented as appropriate)      Problem: Fall Risk (Adult)  Goal: Absence of Fall  Outcome: Ongoing (interventions implemented as appropriate)      Problem: Patient Care Overview  Goal: Plan of Care Review  Outcome: Ongoing (interventions implemented as appropriate)   07/01/19 0406   Coping/Psychosocial   Plan of Care Reviewed With patient   OTHER   Outcome Summary Pt alert, though initially though he was in \"[facility]\". Speech slurred and left side weak, though pt able to lift and hold left arm up with slight drift. Face asymmetrical. Ammonia level rechecked around midnight, it had increased from 37 to 154. On call hospitalist notified. No new orders at this time. Pt slept well. No neuro changes. Up x1 to BR, did well.    Plan of Care Review   Progress no change     Goal: Individualization and Mutuality  Outcome: Ongoing (interventions implemented as appropriate)      Problem: Oncology Care (Adult)  Goal: Signs and Symptoms of Listed Potential Problems Will be Absent, Minimized or Managed (Oncology Care)  Outcome: Ongoing (interventions implemented as appropriate)        "

## 2019-07-01 NOTE — PROGRESS NOTES
Discharge Planning Assessment  [facility]     Patient Name: Lukasz Savage  MRN: [id number]  Today's Date: 7/1/2019    Admit Date: 6/29/2019    Discharge Needs Assessment     Row Name 07/01/19 1144       Living Environment    Lives With  parent(s)    Name(s) of Who Lives With Patient  mother Coronado    Current Living Arrangements  home/apartment/condo    Primary Care Provided by  self;parent(s)    Provides Primary Care For  no one, unable/limited ability to care for self    Family Caregiver if Needed  parent(s);child(alethea), adult    Family Caregiver Names  Ivonne, mother;   Julieta, daughter    Quality of Family Relationships  helpful;involved;supportive    Able to Return to Prior Arrangements  yes Pending PT eval    Living Arrangement Comments  Desires home with parent to assist.        Resource/Environmental Concerns    Resource/Environmental Concerns  none    Transportation Concerns  car, none       Transition Planning    Patient/Family Anticipates Transition to  home with family    Patient/Family Anticipated Services at Transition  home health care    Transportation Anticipated  family or friend will provide       Discharge Needs Assessment    Readmission Within the Last 30 Days  other (see comments) readmit interview completed; last admits 9 days ago    Concerns to be Addressed  decision making;discharge planning    Equipment Currently Used at Home  cane, straight;walker, rolling;wheelchair    Anticipated Changes Related to Illness  inability to care for self    Equipment Needed After Discharge  commode;hospital bed;other (see comments) tiffanie walker? vs platform walker    Outpatient/Agency/Support Group Needs  homecare agency    Offered/Gave Vendor List  yes    Patient's Choice of Community Agency(s)  [facility]    Current Discharge Risk  chronically ill    Discharge Coordination/Progress  Spoke with mother, Ivonne, who is primay caregiver.  Requesting hospital bed/Bedside commode at time of dc.  PT eval pending. Right upper extremity flaccid at time of interview. Able to move/lift BLE.  Will follow for PT recommendations. Patient currently is transported to T at [facility] per mother. CHEMO is oral.  Patient/Mother not interested in Hospice at this time.         Discharge Plan    No documentation.       Destination      No service coordination in this encounter.      Durable Medical Equipment      No service coordination in this encounter.      Dialysis/Infusion      No service coordination in this encounter.      Home Medical Care      Service Provider Request Status Selected Services Address Phone Number Fax Number    [facility] Pending - Request Sent N/A [address], [facility] [id number] [phone]      Therapy      No service coordination in this encounter.      Community Resources      No service coordination in this encounter.          Demographic Summary    No documentation.       Functional Status    No documentation.       Psychosocial    No documentation.       Abuse/Neglect    No documentation.       Legal    No documentation.       Substance Abuse    No documentation.       Patient Forms    No documentation.           Esther Samuel RN

## 2019-07-01 NOTE — NURSING NOTE
Tick found on pt's right posterior head.  Removed with tweezers, head attached.  Site cleaned with alcohol.  No redness noted to site.  Shelby Edward RN

## 2019-07-01 NOTE — PROGRESS NOTES
[facility] Medicine Services  INPATIENT PROGRESS NOTE    Patient Name: Lukasz Savage  Date of Admission: 6/29/2019  Today's Date: 07/01/19  Length of Stay: 2  Primary Care Physician: Linda Hoffman, DNP, APRN    Subjective   Chief Complaint: Left upper extremity paresis.   HPI   Very pleasant 54-year-old  gentleman who was recently diagnosed with a malignant astrocytoma.  He has been on Temodar and has been getting palliative radiation.  He has been in and out of the hospital several times over the last month secondary to seizure activity.  He presented on this occasion with left-sided hemiparesis felt secondary to cerebral edema from his mass versus a Melchor's paralysis post seizure.  He has regained much of his strength in the left lower extremity and can hold it against gravity.  However, he has not been able to move his left upper extremity.    He denies headache or other pain to me this morning.    His mother would like to take him home with home health at discharge if able.    He did have his scheduled radiation treatment this morning.    Review of Systems   All pertinent negatives and positives are as above. All other systems have been reviewed and are negative unless otherwise stated.     Objective    Temp:  [96.3 °F (35.7 °C)-98.4 °F (36.9 °C)] 97.9 °F (36.6 °C)  Heart Rate:  [64-94] 64  Resp:  [16-19] 19  BP: (124-147)/(85-97) 124/88  Physical Exam   Constitutional: He is oriented to person, place, and time. He appears well-developed and well-nourished.   In bed.  No distress.  Drowsy.  His mother is present with him.  Discussed with his nurse, Jacque.  Discussed with Dr. Arroyo.    HENT:   Head: Normocephalic and atraumatic.   Eyes: Conjunctivae and EOM are normal. Pupils are equal, round, and reactive to light.   Neck: Neck supple. No JVD present.   Cardiovascular: Normal rate, regular rhythm, normal heart sounds and intact distal pulses. Exam  reveals no gallop.   Pulmonary/Chest: Effort normal and breath sounds normal. No respiratory distress. He exhibits no tenderness.   Abdominal: Soft. Bowel sounds are normal. He exhibits no distension. There is no tenderness. There is no rebound and no guarding.   Musculoskeletal: Normal range of motion. He exhibits no edema, tenderness or deformity.   Neurological: He is alert and oriented to person, place, and time. He displays abnormal reflex. No cranial nerve deficit. He exhibits abnormal muscle tone.   0/5 strength in the left upper extremity.  4/5 strength of the left lower extremity.   Skin: Skin is warm and dry. No rash noted.   Psychiatric:   Flat.      Results Review:  I have reviewed the labs, radiology results, and diagnostic studies.    Laboratory Data:   Results from last 7 days   Lab Units 06/30/19  0634 06/29/19  0948   WBC 10*3/mm3 9.68 9.59   HEMOGLOBIN g/dL 13.4* 12.0*   HEMATOCRIT % 39.9* 36.5*   PLATELETS 10*3/mm3 276 308     Results from last 7 days   Lab Units 06/30/19  0634 06/29/19  0948 06/27/19  1453   SODIUM mmol/L 141 142 146*   POTASSIUM mmol/L 4.5 4.1 4.2   CHLORIDE mmol/L 111* 108 108*   TOTAL CO2 mmol/L  --   --  23.8   CO2 mmol/L 21.0* 23.0*  --    BUN mg/dL 32* 33* 20   CREATININE mg/dL 1.25 1.37 1.33*   CALCIUM mg/dL 8.8 8.9 9.3   BILIRUBIN mg/dL 0.5 0.4 0.2   ALK PHOS U/L 54 51 66   ALT (SGPT) U/L 157* 173* 184*   AST (SGOT) U/L 54* 68* 50*   GLUCOSE mg/dL 70 131*  --      I have reviewed the patient's current medications.     Assessment/Plan     Active Hospital Problems    Diagnosis   • **Left hemiparesis (CMS/HCC)   • Astrocytoma brain tumor (CMS/HCC)   • Seizures (CMS/HCC)   • Elevated transaminase level   • Hypoglycemia   • Anemia   • HTN (hypertension)     Plan:  The patient was originally admitted on 6/29 by Dr. Encinas.  The patient has been undergoing radiation treatments for a malignant astrocytoma.  He has also been on temozolomide.  He is actively being followed by   Jaskaran, Dr. Romero, and Dr. Trevino as an outpatient. He presented with left-sided weakness.  This is felt to either be secondary to mass-effect or a Melchor's paralysis post seizure activity.  He has been admitted to the hospital several times recently for seizure activity.    Adjustments are being made to his antiepileptic drugs by Dr. Arroyo.    He continues on oral dexamethasone.    Dr. Encinas felt that his elevated transaminases are secondary to antiepileptic drug treatment.  However, Temodar can also cause severe hepatotoxicity. I would like oncology to see him for this as well.  His transaminases were normal prior to June 21.  His mother tells me that his Temodar therapy has been interrupted twice while he was in the hospital.  Start lactulose for elevated ammonia.      PT to see.    Palliative care to see.    Discharge Planning: I expect the patient to be discharged to home tomorrow. Home health?  His mother needs durable medical equipment as well.    Jairon Perez,    07/01/19   11:20 AM

## 2019-07-01 NOTE — CONSULTS
MEDICAL ONCOLOGY CONSULTATION    Pt Name: Lukasz Savage  MRN: [id number]  YOB: 1965    Date of Admission: 6/29/2019  Date of Consultation: 7/2/2019  Room: 332    Requesting Physician: Dr. Perez    Reason for Consultation: Anaplastic Astrocytoma    History Obtained From:  PATIENT and CHART    HISTORY OF PRESENT ILLNESS:    Mr. Lukasz Savage is a pleasant 39-year-old  gentleman with inoperable anaplastic astrocytoma who is presently receiving treatment with chemoradiation therapy with Temodar. Lukasz was recently admitted on 2 separate ooccasion for seizure activity. He was admitted from 6/14/2019-6/20/2019 after experiencing new onset of seizure activity requiring intubation and sedation. He was evaluated by Dr. Arroyo and placed on antiepileptic medications, Vimpat, Keppra and Depakote. Lukasz was then admitted to [facility] for refractory local seizures of the left extremities from 6/22/2019-6/23/2019. He was evaluated by Dr. Hall and placed on a fourth antiepileptic medication, Lyrica.     Lukasz was last seen in clinic on 6/26/2019 with left-sided weakness and new onset of intermittent hallucinations. He is presently readmitted to [facility] for hypoglycemia, lethargy and left-sided weakness.    Oncology consultation has been requested for continuation of care.    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 Doyle on 5/24/2019 referred by Dr. Trevino for diagnosis of primary brain tumor, grade 3 astrocytoma. Luaksz was being seen by neurology with complaints of left facial and upper extremity.  4/2/2019-MRI brain with contrast showed changes in the frontal convexity with a fullness of the  sulcal gyral pattern. Specifically, 4.5 x 4.5 x 3.5 cm right frontal lesion. Second, discrete hyperintense nodule measuring 1.1 x 1.9 x 1.5 cm in the subcortical white matter of the paramedian posterior right frontal lobe immediately above the corpus callosum. This was concerning for high-grade glioma.   4/15/2019-he underwent a craniotomy with stereotactic biopsy by Dr. Dillon Trevino at [facility]. 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 Further molecular analysis at [facility] revealed IDH1/2 wild type, MGMT non-methylated, TERT mutation. Complex cytogenetics changes A maximum safe resection was not possible due to concerns of significant sequela. Second opinion was recommended at the [facility].   5/17/2019-he was seen by Dr. Oral Jessica. Recommended concurrent chemoradiation.   5/24/2019-initial oncology consultation, Dr. Doyle recommended concurrent chemoradiation with Temodar.   6/3/2019. CT scan of the head without contrast documented to ill-defined masslike areas of increased attenuation within the right frontal lobe. Largest measuring 3.3 cm, previously measuring 1.8 cm and second lesion in the lateral aspect of the right frontal lobe measuring up to 1.5 cm. No intracranial hemorrhage or midline shift.   6/3/2019- MRI of the brain with and without contrast, compared to 4/2/2019 documented masslike appearance at the right frontal lobe measuring 2.9 x 1.4 cm, previously measuring 1.7 x 1.1 cm with mass effect and possible extension into the corpus callosum. No midline shift.   6/14/2019-CT scan cervical spine without contrast documented no evidence of acute bony injury. Multilevel disc degeneration spondylosis.   6/12/2019- Initiated Temodar along with radiation therapy   6/17/2019- MRI of the brain with and without contrast documented 3 cm enhancing, partially necrotic tumor in the both the right corpus callosum body, consistent with known astrocytoma.  Additional FLAIR signal on both sides of the right central sulcus with faint contrast enhancement in the precentral gyrus, compatible with tumor extension. No hemorrhage or brain herniation.   6/21/2019 - CT scan of the head without contrast hypodense focus of the right frontal lobe measuring 3.3 cm. No intracranial hemorrhage. No abnormal extra-axial fluid collection. Right frontal craniotomy changes.    History  Past Medical History:   Diagnosis Date   • Anemia 6/17/2019   • Angio-edema 7/3/2017   • Anxiety    • Arthritis    • Cancer (CMS/HCC) 05/09/2019    Brain cancer diagnoised yesterday  Dr Trevino    • Clostridium difficile colitis    • Erectile dysfunction 10/6/2016   • GERD (gastroesophageal reflux disease)    • Gout    • HCAP (healthcare-associated pneumonia) 7/11/2017   • Hyperlipidemia    • Malignant hypertension    • MSSA (methicillin susceptible Staphylococcus aureus) infection 7/31/2017   • Obstructive sleep apnea    • S/P shoulder surgery 7/27/2018   • Seizures (CMS/HCC) 7/4/2017     Past Surgical History:   Procedure Laterality Date   • COLONOSCOPY     • CRANIOTOMY Right 4/15/2019    Procedure: CRANIOTOMY WITH BIOPSY RIGHT;  Surgeon: Dillon Trevino MD;  Location: [facility] OR;  Service: Neurosurgery   • SHOULDER ARTHROSCOPY Left    • TRACHEOSTOMY N/A 7/12/2017    Procedure: TRACHEOSTOMY WITH TRACHEOSCOPY;  Surgeon: Jairon Pierson MD;  Location: [facility] OR;  Service:      Family History   Problem Relation Age of Onset   • Hypertension Mother    • Diabetes Mother    • Heart disease Father    • Hypertension Father    • No Known Problems Daughter    • No Known Problems Son    • Diabetes Maternal Grandmother    • No Known Problems Maternal Grandfather    • No Known Problems Paternal Grandmother    • Heart attack Paternal Grandfather    • Kidney disease Sister       Social History     Tobacco Use   • Smoking status: Former Smoker     Packs/day: 0.33     Years: 30.00     Pack years: 9.90     Types:  Cigarettes     Last attempt to quit: 7/3/2017     Years since quittin.9   • Smokeless tobacco: Never Used   Substance Use Topics   • Alcohol use: No     Frequency: Never     Comment: used to drink alot but now just ocasional beer since has seizure in 2017   • Drug use: No     Medications Prior to Admission   Medication Sig Dispense Refill Last Dose   • allopurinol (ZYLOPRIM) 300 MG tablet Take 300 mg by mouth Daily.   2019 at Unknown time   • ALPRAZolam (XANAX) 0.25 MG tablet Take 1 tablet by mouth 2 (Two) Times a Day As Needed for Anxiety. 60 tablet 0 Past Week at Unknown time   • aspirin 81 MG tablet Take 1 tablet by mouth Daily.   2019 at Unknown time   • dexamethasone (DECADRON) 4 MG tablet Take 1 tablet by mouth 3 (Three) Times a Day With Meals. 90 tablet 0 2019 at Unknown time   • divalproex (DEPAKOTE) 500 MG DR tablet Take 3 tablets by mouth Every 8 (Eight) Hours. 90 tablet 0 2019 at Unknown time   • fenofibrate (TRICOR) 145 MG tablet Take 1 tablet by mouth Daily. 90 tablet 1 2019 at Unknown time   • lacosamide (VIMPAT) 50 MG tablet tablet Take 3 tablets by mouth Every 12 (Twelve) Hours for 30 days. (Patient taking differently: Take 100 mg by mouth Every 12 (Twelve) Hours.) 180 tablet 0 2019 at Unknown time   • levETIRAcetam (KEPPRA) 500 MG tablet Take 2.5 tablets by mouth 2 (Two) Times a Day. 30 tablet 0 2019 at Unknown time   • losartan (COZAAR) 100 MG tablet Take 100 mg by mouth Daily.   2019 at Unknown time   • Omega-3 Fatty Acids (FISH OIL) 1000 MG capsule capsule Take 2 capsules by mouth Daily With Breakfast. 180 capsule 1 2019 at Unknown time   • omeprazole (PRILOSEC) 10 MG capsule Take 1 capsule by mouth Daily. For acid reflux 90 capsule 1 2019 at Unknown time   • oxyCODONE-acetaminophen (PERCOCET) 7.5-325 MG per tablet Take 1 tablet by mouth Every 4 (Four) Hours As Needed for Moderate Pain  or Severe Pain . 40 tablet 0 2019 at Unknown time    • pregabalin (LYRICA) 150 MG capsule Take 150 mg by mouth 2 (Two) Times a Day.   6/28/2019 at Unknown time   • promethazine (PHENERGAN) 12.5 MG tablet Take 12.5 mg by mouth Every 4 (Four) Hours As Needed for Nausea or Vomiting.   Past Week at Unknown time   • QUEtiapine (SEROquel) 50 MG tablet Take 1 tablet by mouth Every Night. 30 tablet 0 6/28/2019 at Unknown time   • sulfamethoxazole-trimethoprim (BACTRIM DS,SEPTRA DS) 800-160 MG per tablet Take 1 tablet by mouth 3 (Three) Times a Week. Monday, Wednesday, and Friday.   6/28/2019 at Unknown time   • TEMOZOLOMIDE PO Take 150 mg by mouth Daily. 140 mg + 10 mg daily.   6/28/2019 at Unknown time   • venlafaxine XR (EFFEXOR-XR) 150 MG 24 hr capsule Take 1 capsule by mouth Daily. 90 capsule 1 6/28/2019 at Unknown time   • vitamin B-6 (PYRIDOXINE) 100 MG tablet Take 1 tablet by mouth Daily. 30 tablet 3 6/28/2019 at Unknown time      Scheduled Meds:      aspirin 81 mg Oral Daily   dexamethasone 4 mg Oral TID With Meals   Divalproex Sodium 1,500 mg Oral Q8H   famotidine 20 mg Oral BID AC   lacosamide 150 mg Oral Q12H   lactulose 20 g Oral Daily   levETIRAcetam 1,250 mg Oral BID   nicotine 1 patch Transdermal Q24H   pregabalin 100 mg Oral Q12H   QUEtiapine 50 mg Oral Nightly   sodium chloride 3 mL Intravenous Q12H   temozolomide 10 mg Oral Nightly   And      temozolomide 140 mg Oral Nightly   venlafaxine  mg Oral Daily     PRN Meds:  •  ALPRAZolam  •  bisacodyl  •  Morphine **AND** naloxone  •  ondansetron  •  [COMPLETED] Insert peripheral IV **AND** sodium chloride  •  sodium chloride   Allergies:  Lipitor [atorvastatin] and Lisinopril    Subjective .     REVIEW OF SYSTEMS:   History obtained from chart review and the patient  General: positive for  - fatigue, left-sided weakness   ENT: negative for - oral lesions  Allergy and Immunology: negative   Hematological and Lymphatic: negative for - weight loss  Respiratory: negative for - cough and shortness of  breath  Cardiovascular: negative for - chest pain or palpitations  Gastrointestinal: negative for - appetite loss   Genito-Urinary: negative for - dysuria or hematuria  Musculoskeletal: positive for - flacid left arm, left lower extremity sided weakness  Neurological: negative for - confusion, dizziness or headaches  Dermatological: negative for pruritus and rash      Objective     Vital Signs   Temp:  [97.7 °F (36.5 °C)-98.7 °F (37.1 °C)] 98.7 °F (37.1 °C)  Heart Rate:  [64-89] 66  Resp:  [16-19] 18  BP: (124-143)/(79-94) 128/90    PHYSICAL EXAMINATION:  General Appearance: Arouses easily, cooperative, in no acute distress  Head: Normocephalic, without obvious abnormality, atraumatic  Eyes: Normal conjunctivae and sclerae normal, anicteric, no pallor, corneas clear  Ears: Ears appear intact with no abnormalities noted, hearing grossly normal  Throat: No oral lesions, no thrush, oral mucosa moist  Neck: No adenopathy, supple, trachea midline, no JVD  Lungs: Clear to auscultation, respirations regular, even and unlabored  Heart: Regular rhythm and normal rate, no murmur, no gallop, no rub, no click  Abdomen: Normal bowel sounds, no masses, no organomegaly, soft non-tender, non-distended, no guarding, no rebound tenderness  Genitalia: Deferred  Extremities:  No edema, no cyanosis, no redness. LLE weakness, flaccid left arm  Skin: No bleeding, bruising or rash  Lymph nodes: No palpable adenopathy  Neurologic: Not formally tested. Speech is garbled at times although answering appropriately      CBC  Results from last 7 days   Lab Units 07/02/19  0454 06/30/19  0634 06/29/19  0948   WBC 10*3/mm3 8.82 9.68 9.59   HEMOGLOBIN g/dL 13.7* 13.4* 12.0*   HEMATOCRIT % 41.7 39.9* 36.5*   PLATELETS 10*3/mm3 292 276 308       CMP  Lab Results   Component Value Date    GLUCOSE 93 07/02/2019    BUN 34 (H) 07/02/2019    CREATININE 1.40 07/02/2019    EGFRIFNONA 53 (L) 07/02/2019    EGFRIFAFRI 68 06/27/2019    BCR 24.3 07/02/2019    CO2  27.0 07/02/2019    CALCIUM 8.8 07/02/2019    PROTENTOTREF 6.1 06/27/2019    ALBUMIN 3.50 07/02/2019    LABIL2 1.3 06/27/2019    AST 85 (H) 07/02/2019     (H) 07/02/2019             Imaging Results (last 72 hours)     Procedure Component Value Units Date/Time    CT Head Without Contrast [[id number]] Collected:  06/29/19 1116     Updated:  06/29/19 1126    Narrative:       EXAMINATION: CT HEAD WO CONTRAST- 6/29/2019 11:16 AM CDT     HISTORY: Altered mental status, h/o brain tumor, anaplastic astrocytoma.     DOSE: 714 mGycm (Automatic exposure control technique was implemented in  an effort to keep the radiation dose as low as possible without  compromising image quality)     REPORT: Spiral CT of the head was performed without contrast,  reconstructed coronal and sagittal images were also reviewed.     COMPARISON: CT of the head without contrast 06/21/2019 and brain MRI  with and without contrast 06/17/2019.     There is a small craniotomy defect over the right parietal region as  before, there are 2 hyperattenuating masses in the right frontal lobe  with ill-defined margins that appear similar to the previous CT. The  largest is seen adjacent to the falx on image 19 of series 3 measures up  to 3.2 cm, essentially unchanged compared with the previous CT. There is  a small area of probable central necrosis within this larger mass. The  second more lateral lesion is difficult to measure and appears  elongated, extending superiorly into the right frontal region. This also  appears to be stable.There are no definite new lesions. No intracranial  hemorrhage is identified. The ventricles and basal cisterns appear  normal.       Impression:       1. No acute intracranial abnormality, there are ill-defined masses in  the right frontal lobe compatible the history of brain neoplasm, similar  to the previous head CT. The largest measures about 3.2 cm and shows  mild central necrosis.  This report was finalized on  06/29/2019 11:23 by Dr. Marvin Mar MD.            Assessment/Plan     #1 Inoperable anaplastic glioma  · Continue chemoradiation therapy with Temodar 150 mg daily, will be completing 15 of 30 fractions of SBRT today  · Palliative care assisting  · CT scan of the head without contrast on 6/29/2019 redemonstrated 2 hyperattenuating masses in the right frontal lobe with ill-defined margins that appear similar compared to MRI of the brain on 6/17/2019 and CT of the head on 6/21/2019. Largest lesion measures 3.2 cm and shows mild central necrosis.    #2 Elevated transaminase level, improving and suspect secondary to Temodar  · , AST 91, alkaline phosphatase 93, and total bilirubin 0.2 on 6/26/2019  ·  and AST 85 on 7/2/2019    #3 Seizure activity with residual left-sided weakness  · Neurology assisting and managing antiepileptics (Depakote, Vimpat, Keppra and Lyrica) and dexamethasone dosing (Decadron 4 mg PO TID)      Thank you for the consultation opportunity to participate in Lukasz's plan of care.  Agree with the current plan of care and to continue palliative treatment with chemoradiation therapy.  Will continue monitoring ALT as outpatient as scheduled.  Anticipate discharge home with home health when appropriate with others.      EPI Ferguson  07/02/19  6:57 AM    Extended conference with Mr Savage and his mother regarding diagnosis, prognosis and expectations of teatment.  They desire to proceed with aggressive treatment going forward at this time.    I personally saw and examined this patient 7/2/2019, performing a face-to-face diagnostic evaluation with plan of care reviewed and developed with  MICHELE Ferguson and nursing staff.   I have addended and/or modified the above history of present illness, physical examination, and assessment and plan to reflect my findings and impressions.   Essential elements of the care plan were discussed with  MICHELE Ferguson .   Agree with  findings and assessment/plan as documented above.  Questions were encouraged, asked and answered to their understanding and satisfaction.    Delio Romero MD  7/3/2019 7:39 AM

## 2019-07-01 NOTE — PROGRESS NOTES
Neurology Progress Note      Date of admission: 6/29/2019  9:10 AM  Date of visit: 7/1/2019    Chief Complaint:  F/u left sided weakness    Subjective     Subjective:    Patient's mother at bedside.  No events overnight.  She believes his left side is getting stronger again.  The left arm weakness pre-dated this admission but he could ambulate without assistance at home.      Medications:  Current Facility-Administered Medications   Medication Dose Route Frequency Provider Last Rate Last Dose   • ALPRAZolam (XANAX) tablet 0.25 mg  0.25 mg Oral BID PRN Cristino Encinas DO   0.25 mg at 06/30/19 2303   • aspirin chewable tablet 81 mg  81 mg Oral Daily Cristino Encinas DO   81 mg at 07/01/19 0810   • bisacodyl (DULCOLAX) suppository 10 mg  10 mg Rectal Daily PRN Cristino Encinas DO       • dexamethasone (DECADRON) tablet 4 mg  4 mg Oral TID With Meals Cristino Encinas DO   4 mg at 07/01/19 0801   • Divalproex Sodium (DEPAKOTE SPRINKLE) capsule 1,500 mg  1,500 mg Oral Q8H Cristino Encinas DO   1,500 mg at 07/01/19 0801   • famotidine (PEPCID) injection 20 mg  20 mg Intravenous Q12H Cristino Encinas DO   20 mg at 07/01/19 0810   • lacosamide (VIMPAT) tablet 150 mg  150 mg Oral Q12H Cristino Encinas DO   150 mg at 07/01/19 0836   • levETIRAcetam (KEPPRA) tablet 1,250 mg  1,250 mg Oral BID Cristino Encinas DO   1,250 mg at 07/01/19 0810   • morphine injection 1 mg  1 mg Intravenous Q4H PRN Cristino Encinas DO        And   • naloxone (NARCAN) injection 0.4 mg  0.4 mg Intravenous Q5 Min PRN Cristino Encinas DO       • nicotine (NICODERM CQ) 21 MG/24HR patch 1 patch  1 patch Transdermal Q24H Cristino Encinas DO   1 patch at 06/30/19 1802   • ondansetron (ZOFRAN) injection 4 mg  4 mg Intravenous Q6H PRN Cristino Encinas DO   4 mg at 06/29/19 2045   • pregabalin (LYRICA) capsule 100 mg  100 mg Oral Q12H Doreen Zuniga MD   100 mg at 07/01/19 0810   • QUEtiapine  (SEROquel) tablet 50 mg  50 mg Oral Nightly Cristino Encinas S, DO   50 mg at 06/30/19 2305   • sodium chloride 0.9 % flush 10 mL  10 mL Intravenous PRN Dillon Rios Jr., MD       • sodium chloride 0.9 % flush 3 mL  3 mL Intravenous Q12H Flor Encinasurice S, DO   3 mL at 07/01/19 0811   • sodium chloride 0.9 % flush 3-10 mL  3-10 mL Intravenous PRN Cristino Encinas S, DO       • temozolomide (TEMODAR) chemo capsule 10 mg  10 mg Oral Nightly Encinas, Cristino S, DO   10 mg at 06/30/19 2307    And   • temozolomide (TEMODAR) chemo capsule 140 mg  140 mg Oral Nightly Hayley Encinase S, DO   140 mg at 06/30/19 2307   • venlafaxine XR (EFFEXOR-XR) 24 hr capsule 150 mg  150 mg Oral Daily Cristino Encinas S, DO   150 mg at 07/01/19 0810       Review of Systems:   -A 14 point review of systems is completed and is negative except for persistent but improving left sided weakness and hallucintaions    Objective     Objective      Vital Signs  Temp:  [96.3 °F (35.7 °C)-98.4 °F (36.9 °C)] 97.9 °F (36.6 °C)  Heart Rate:  [64-94] 64  Resp:  [16-19] 19  BP: (124-147)/(85-97) 124/88    Physical Exam:    HEENT:  Neck supple  CVS:  Regular rate and rhythm.  No murmurs  Carotid Examination:  No bruits  Lungs:  Clear to auscultation  Abdomen:  Non-tender, Non-distended  Extremities:  No signs of peripheral edema    Neurologic Exam:    -Awake, Alert, Oriented to person and place  --Follows simple  commands    Cranial nerves II through XII intact. Pupils react. EOMI  No facial weakness    Motor: (strength out of 5:  1= minimal movement, 2 = movement in plane of gravity, 3 = movement against gravity, 4 = movement against some resistance, 5 = full strength)    -Right Upper Ext: Proximal: 5 Distal: 5  -Left Upper Ext: Proximal:3+ Distal: 0    -Right Lower Ext: Proximal: 5 Distal: 5  -Left Lower Ext: Proximal: 4 Distal:2+    DTR:  3+ throughout on left and 2++ on right extremities    Sensory:  -Intact to light  touch  Coordination/Gait:  -No ataxia but weakness contributing to abnormalities seen   Results Review:    I reviewed the patient's new clinical results.    Lab Results (last 24 hours)     Procedure Component Value Units Date/Time    POC Glucose Once [[id number]]  (Normal) Collected:  07/01/19 0743    Specimen:  Blood Updated:  07/01/19 0757     Glucose 74 mg/dL      Comment: : [id number] Garrison ShannonMeter ID: [id number]       Ammonia [[id number]]  (Abnormal) Collected:  06/30/19 2311    Specimen:  Blood Updated:  06/30/19 2335     Ammonia 154 umol/L     POC Glucose Once [[id number]]  (Normal) Collected:  06/30/19 1958    Specimen:  Blood Updated:  06/30/19 2009     Glucose 103 mg/dL      Comment: : [id number] Ramos (Ollie) AmandaMeter ID: [id number]       POC Glucose Once [[id number]]  (Normal) Collected:  06/30/19 1707    Specimen:  Blood Updated:  06/30/19 1719     Glucose 109 mg/dL      Comment: : [id number] Lian MartinezenMeter ID: [id number]       POC Glucose Once [[id number]]  (Normal) Collected:  06/30/19 1141    Specimen:  Blood Updated:  06/30/19 1153     Glucose 83 mg/dL      Comment: : [id number] Lian MartinezenMeter ID: [id number]           Imaging Results (last 24 hours)     ** No results found for the last 24 hours. **          Assessment/Plan     Hospital Problem List      Left hemiparesis (CMS/HCC)    HTN (hypertension)    Seizures (CMS/HCC)    Astrocytoma brain tumor (CMS/HCC)    Anemia    Hypoglycemia    Impression:  1. 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 Seizures on 4 antiepileptics but inadvertent reduction of Vimpat to a previous dose by mother  Per family he had a seizure this morning  3. Hallucinations while on Lyrica  It seemed to have been started at a fairly high dose.   4. 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  Because it is improving already I suspect this is playing a  role and that the episode of hypoglycemia also played a role    Seizures while on 4 anticonvulsants are not likely to be able to fully preventable    Plan:  Reduce Lyrica to 100 mg Bid.  May have to reduce further depending on how he does with confusion and hallucinations.  This may be able to be titrated up. More slowly .  Maintain Vimpat at 150 mg bid as he has bradycardia when Vimpat is higher dose.   Will closely follow  Therapy consult for D/C planning  Considering home with hospice per social work notes.      Harpreet Arroyo MD  07/01/19  10:35 AM

## 2019-07-01 NOTE — PROGRESS NOTES
Case Management Readmission Assessment Note       Case Management Readmission Assessment (all recorded)      Readmission Interview     [facility] Name 07/01/19 1202             Readmission Indications    Is this hospitalization related to the prior hospital diagnosis?  Yes      What was the reason you were admitted?  seizures      [facility] Name 07/01/19 1202             Recommendation for rehospitalization    Did you speak with your physician prior to coming to the hospital  No      Who recommended you return to the hospital?  Other (comment) parent during seizure called 911      Did you seek care elsewhere prior to coming to the hospital?  No      [facility] Name 07/01/19 1202             Follow up appointment    Do you have a PCP?  Yes      Did you have an appointment with PCP/specialist after hospitalization within 7 days?  Yes      Did you keep your appointment?  Yes      [facility] Name 07/01/19 1202             Medications    Did you have newly prescribed medications at discharge?  Yes      Did you understand the reasons for your medications at discharge and how to take them?  Yes      Did you understand the side effects of your medications?  Yes      Are you taking all of you prescribed medications?  Yes      [facility] Name 07/01/19 1202             Discharge Instructions    Did you understand your discharge instructions?  Yes      Did your family/caregiver hear your instructions?  Yes      Were you told to eat a special diet?  No      Did you adhere to the diet?  Yes      Were you given a number of someone to call if you had questions or concerns?  Yes      [facility] Name 07/01/19 1202             Index discharge location/services    Where did you go upon discharge?  Home      Do you have supportive family or friends in the home?  Yes      What services were arranged at discharge?  Other (comment) denied need      [facility] Name 07/01/19 1202             Discharge Readiness    On a scale of 1-5 (5 being well prepared), how ready were you for discharge   5      Recommendation based on interview  Education on diagnosis/self management;Other (comment) home health referral is recommended vs short term rehab. SNF

## 2019-07-01 NOTE — TELEPHONE ENCOUNTER
Patient mother called to see if patient could have Home Health ordered he is currently living with her she is needing help to care for him, and to offer the patient therapy. Patient has recently been dx with brain tumor and has seizures he is undergoing radiation 5 days a week as well. Patient is agreeable to seeing you if you are able to order the home health to possibly help with his care. Please review

## 2019-07-01 NOTE — CONSULTS
Palliative Care Initial Consult   Attending Physician: Jairon Perez DO  Referring Provider: Jairon Perez DO    Reason for Referral: assistance with clarification of goals of care  Family/Support: Patient's mother, Ivonne at bedside  Goals of Care: TBD.  Code Status and Medical Interventions:   Ordered at: 06/29/19 1500     Level Of Support Discussed With:    Patient     Code Status:    CPR     Medical Interventions (Level of Support Prior to Arrest):    Full         HPI:   54 y.o. male with past medical history significant for inoperable grade 3 anaplastic astrocytoma (April 2019, see Dr. Doyle, Dr. Trevino, and Dr. Jessica on oral chemotherapy-Temodar and undergoing radiation 6/12/2019, underwent biopsy and unable to complete craniotomy 4/15/2019-maximum safe resection was not possible due to concerns of significant sequela) and resulting seizure activity.  Per chart review patient is had progressive worsening of left-sided weakness.  Additional health history positive for anemia, and hypertension.  Multiple hospitalizations over the last month secondary to seizure activity. Patient presented to [facility] on 6/29/2019 related to left-sided weakness and low blood sugar.  Family requested and spoke with hospice yesterday.  Neurology note reviewed and patient is currently on 4 anticonvulsants and experiencing seizures. It is felt not likely able to fully prevent seizure activity.  Attending notes concerns for severe hepatotoxicity with Temodar.  Patient's oncologist, Dr. Romero has been consulted. Palliative Care Spoke With: patient and family verbalized decline quality of life and functional status over the last few months related to recurrent seizure and hospitalizations.  Patient has received 14 of planned 30 stereotactic radiation treatments and taking oral chemotherapy.  Patient is also followed up with physician at [facility] who does not feel patient is candidate for  surgical intervention.  Due to the Palliative Care Topics Discussed: palliative care, goals of care, care options, resuscitation status and [facility] we will establish an advance care plan.   Advance Care Planning   Advance Care Planning Discussion: Patient and mother are able to verbalize a good understanding of the patient's current health status.  They recognize patient is currently on 4 antiepileptics having difficulty maintaining control of seizures.  We discussed care options to include aggressive care measures versus transition to focus on more of a comfort care approach with hospice.  Family and patient met with hospice liaison yesterday and at this juncture wish to continue with aggressive care measures including radiation and any other treatment options as recommended by oncology.  We discussed possible associated risk factors and declining quality of life and functional status as a result of treatment interventions.  Patient and family report at this juncture they are willing to take those associated risks.  We further explored patient's current advance directive status. We reviewed purpose and goals for advance care planning. I reviewed with Lukasz qualities to consider when choosing a healthcare agent. Lukasz had selected his daughter Yvette and his mother Ivonne as his healthcare agents. I encouraged Lukasz to discuss his preferences for future care with healthcare agents and others close to him. Goals of medical care for severe, permanent brain injury were explored. Each section of the advance care/living will document was reviewed and discussed. Advance care/living will document finished during this facilitation.      Review of Systems   Constitution: Positive for weakness and malaise/fatigue.   Cardiovascular: Negative for chest pain.   Respiratory: Negative for shortness of breath.    Musculoskeletal: Negative for myalgias.   Gastrointestinal: Negative for melena and nausea.   Genitourinary:  Negative for bladder incontinence and dysuria.   Neurological: Positive for disturbances in coordination and paresthesias.   Psychiatric/Behavioral: The patient is nervous/anxious.         Patient tearful multiple times during this conversation.       Past Medical History:   Diagnosis Date   • Anemia 2019   • Angio-edema 7/3/2017   • Anxiety    • Arthritis    • Cancer (CMS/HCC) 2019    Brain cancer diagnoised yesterday  Dr Trevino    • Clostridium difficile colitis    • Erectile dysfunction 10/6/2016   • GERD (gastroesophageal reflux disease)    • Gout    • HCAP (healthcare-associated pneumonia) 2017   • Hyperlipidemia    • Malignant hypertension    • MSSA (methicillin susceptible Staphylococcus aureus) infection 2017   • Obstructive sleep apnea    • S/P shoulder surgery 2018   • Seizures (CMS/HCC) 2017     Past Surgical History:   Procedure Laterality Date   • COLONOSCOPY     • CRANIOTOMY Right 4/15/2019    Procedure: CRANIOTOMY WITH BIOPSY RIGHT;  Surgeon: Dillon Trevino MD;  Location: [facility] OR;  Service: Neurosurgery   • SHOULDER ARTHROSCOPY Left    • TRACHEOSTOMY N/A 2017    Procedure: TRACHEOSTOMY WITH TRACHEOSCOPY;  Surgeon: Jairon Pierson MD;  Location:  [facility] OR;  Service:      Social History     Socioeconomic History   • Marital status: Single     Spouse name: Not on file   • Number of children: 2   • Years of education: Not on file   • Highest education level: Not on file   Occupational History   • Occupation: ELECTRIAL WORK   Tobacco Use   • Smoking status: Former Smoker     Packs/day: 0.33     Years: 30.00     Pack years: 9.90     Types: Cigarettes     Last attempt to quit: 7/3/2017     Years since quittin.9   • Smokeless tobacco: Never Used   Substance and Sexual Activity   • Alcohol use: No     Frequency: Never     Comment: used to drink alot but now just ocasional beer since has seizure in 2017   • Drug use: No   • Sexual activity: Defer       Allergies  "  Allergen Reactions   • Lipitor [Atorvastatin] Rash   • Lisinopril Angioedema     Swell tongue       Current medication reviewed for route, type, dose and frequency and are current per MAR at time of dictation.      Diagnostics: Reviewed    Patient Active Problem List   Diagnosis   • HTN (hypertension)   • Seizures (CMS/HCC)   • Astrocytoma brain tumor (CMS/HCC)   • Anemia   • Hypoglycemia   • Left hemiparesis (CMS/HCC)   • Elevated transaminase level       Physical Exam:    /85 (BP Location: Left arm, Patient Position: Lying)   Pulse 75   Temp 97.7 °F (36.5 °C) (Oral)   Resp 18   Ht 172.7 cm (68\")   Wt 84.6 kg (186 lb 9.6 oz)   SpO2 97%   BMI 28.37 kg/m²     Physical Exam   Constitutional: He is oriented to person, place, and time. He appears well-developed and well-nourished. He is cooperative. No distress.   HENT:   Head: Normocephalic and atraumatic.   Eyes: Lids are normal.   Neck: Normal range of motion. No JVD present.   Cardiovascular: Normal rate, regular rhythm, normal heart sounds and intact distal pulses.   Pulmonary/Chest: Effort normal and breath sounds normal.   Abdominal: Soft. Bowel sounds are normal.   Musculoskeletal: Normal range of motion.   Neurological: He is alert and oriented to person, place, and time. He exhibits abnormal muscle tone.   0/5 on left upper extremity, 4/5 left lower extremity, 5/5 RUE and RLE.  Noted fasciculations frequent.   Skin: Skin is warm and dry.   Psychiatric: Judgment normal. His speech is slurred. He is slowed. Cognition and memory are normal.   Tearful at multiple times during conversation.     Patient status: Disease state: Controlled with current treatments.  Functional status: Palliative Performance Scale Score: Performance 30% based on the following measures: Ambulation: Totally bed bound, Activity and Evidence of Disease: Unable to do any work, extensive evidence of disease, Self-Care: Total care required,  Intake: Reduced, LOC: Full, drowsy or " confusion   ECOG Status(4) Completely disabled, unable to carry out self-care.  Totally confined to bed or chair.  Nutritional status: Albumin 3.50. Body mass index is 28.37 kg/m².         Family support: The patient receives support from his mother and extended family..  POA/Healthcare surrogate-advance directive completed and on file    Impression/Problem List:    1. Inoperable grade 3 anaplastic astrocytoma   2.  Left-sided weakness  3.  Seizures  4.  Debility      Recommendations/Plan:  1. plan: Goals of care include CODE STATUS CPR\full interventions per attending.     2.  Palliative care encounter  -Plans to continue full care interventions, stereotactic radiation, and any adjuvants including chemotherapy as needed with hopes of gaining a little more time.  -Family and patient met with hospice liaison yesterday.  Plans to discharge home with home health and continue aggressive care measures.  -Advance directive completed on 7/1         Thank you for this consult and allowing us to participate in patient's plan of care. Palliative Care Team will continue to follow patient.     Time spent: 60 minutes spent reviewing medical and medication records, assessing and examining patient, discussing with family, answering questions, providing some guidance about a plan and documentation of care, and coordinating care with other healthcare members, with > 50% time spent face to face.   30 minutes spent on advance care planning.    Laura Collins, APRN  7/1/2019

## 2019-07-01 NOTE — PLAN OF CARE
Problem: Skin Injury Risk (Adult)  Goal: Skin Health and Integrity  Outcome: Ongoing (interventions implemented as appropriate)      Problem: Fall Risk (Adult)  Goal: Absence of Fall  Outcome: Ongoing (interventions implemented as appropriate)      Problem: Patient Care Overview  Goal: Plan of Care Review  Outcome: Ongoing (interventions implemented as appropriate)   07/01/19 1529   Coping/Psychosocial   Plan of Care Reviewed With patient;family   OTHER   Outcome Summary Pt remains confused at times. Speech has been better today. Left side remains weak. Lactulose started. Tearful with advanced care planning.    Plan of Care Review   Progress no change       Problem: Oncology Care (Adult)  Goal: Signs and Symptoms of Listed Potential Problems Will be Absent, Minimized or Managed (Oncology Care)  Outcome: Ongoing (interventions implemented as appropriate)

## 2019-07-02 PROBLEM — C71.9: Chronic | Status: ACTIVE | Noted: 2019-07-02

## 2019-07-02 LAB
ALBUMIN SERPL-MCNC: 3.5 G/DL (ref 3.5–5)
ALBUMIN/GLOB SERPL: 1.3 G/DL (ref 1.1–2.5)
ALP SERPL-CCNC: 57 U/L (ref 24–120)
ALT SERPL W P-5'-P-CCNC: 151 U/L (ref 0–54)
AMMONIA BLD-SCNC: 24 UMOL/L (ref 9–33)
AMMONIA BLD-SCNC: <9 UMOL/L (ref 9–33)
ANION GAP SERPL CALCULATED.3IONS-SCNC: 7 MMOL/L (ref 4–13)
AST SERPL-CCNC: 85 U/L (ref 7–45)
BILIRUB SERPL-MCNC: 0.5 MG/DL (ref 0.1–1)
BUN BLD-MCNC: 34 MG/DL (ref 5–21)
BUN/CREAT SERPL: 24.3 (ref 7–25)
CALCIUM SPEC-SCNC: 8.8 MG/DL (ref 8.4–10.4)
CHLORIDE SERPL-SCNC: 108 MMOL/L (ref 98–110)
CO2 SERPL-SCNC: 27 MMOL/L (ref 24–31)
CREAT BLD-MCNC: 1.4 MG/DL (ref 0.5–1.4)
DEPRECATED RDW RBC AUTO: 52.4 FL (ref 40–54)
ERYTHROCYTE [DISTWIDTH] IN BLOOD BY AUTOMATED COUNT: 16.3 % (ref 12–15)
GFR SERPL CREATININE-BSD FRML MDRD: 53 ML/MIN/1.73
GLOBULIN UR ELPH-MCNC: 2.8 GM/DL
GLUCOSE BLD-MCNC: 93 MG/DL (ref 70–100)
HCT VFR BLD AUTO: 41.7 % (ref 40–52)
HGB BLD-MCNC: 13.7 G/DL (ref 14–18)
MCH RBC QN AUTO: 29.1 PG (ref 28–32)
MCHC RBC AUTO-ENTMCNC: 32.9 G/DL (ref 33–36)
MCV RBC AUTO: 88.5 FL (ref 82–95)
PLATELET # BLD AUTO: 292 10*3/MM3 (ref 130–400)
PMV BLD AUTO: 9.5 FL (ref 6–12)
POTASSIUM BLD-SCNC: 4.5 MMOL/L (ref 3.5–5.3)
PROT SERPL-MCNC: 6.3 G/DL (ref 6.3–8.7)
RBC # BLD AUTO: 4.71 10*6/MM3 (ref 4.8–5.9)
SODIUM BLD-SCNC: 142 MMOL/L (ref 135–145)
WBC NRBC COR # BLD: 8.82 10*3/MM3 (ref 4.8–10.8)

## 2019-07-02 PROCEDURE — 97162 PT EVAL MOD COMPLEX 30 MIN: CPT

## 2019-07-02 PROCEDURE — 85027 COMPLETE CBC AUTOMATED: CPT | Performed by: INTERNAL MEDICINE

## 2019-07-02 PROCEDURE — 80053 COMPREHEN METABOLIC PANEL: CPT | Performed by: INTERNAL MEDICINE

## 2019-07-02 PROCEDURE — 97116 GAIT TRAINING THERAPY: CPT

## 2019-07-02 PROCEDURE — 99232 SBSQ HOSP IP/OBS MODERATE 35: CPT | Performed by: PSYCHIATRY & NEUROLOGY

## 2019-07-02 PROCEDURE — 82140 ASSAY OF AMMONIA: CPT | Performed by: INTERNAL MEDICINE

## 2019-07-02 PROCEDURE — 82140 ASSAY OF AMMONIA: CPT | Performed by: PSYCHIATRY & NEUROLOGY

## 2019-07-02 PROCEDURE — 77412 RADIATION TX DELIVERY LVL 3: CPT | Performed by: RADIOLOGY

## 2019-07-02 PROCEDURE — 97530 THERAPEUTIC ACTIVITIES: CPT

## 2019-07-02 PROCEDURE — 63710000001 DEXAMETHASONE PER 0.25 MG: Performed by: FAMILY MEDICINE

## 2019-07-02 RX ORDER — LACTULOSE 20 G/30ML
20 SOLUTION ORAL 2 TIMES DAILY
Status: DISCONTINUED | OUTPATIENT
Start: 2019-07-02 | End: 2019-07-03 | Stop reason: HOSPADM

## 2019-07-02 RX ADMIN — ALPRAZOLAM 0.25 MG: 0.25 TABLET ORAL at 11:33

## 2019-07-02 RX ADMIN — VENLAFAXINE HYDROCHLORIDE 150 MG: 75 CAPSULE, EXTENDED RELEASE ORAL at 08:35

## 2019-07-02 RX ADMIN — LACTULOSE 20 G: 20 SOLUTION ORAL at 23:40

## 2019-07-02 RX ADMIN — DEXAMETHASONE 4 MG: 4 TABLET ORAL at 17:15

## 2019-07-02 RX ADMIN — DEXAMETHASONE 4 MG: 4 TABLET ORAL at 11:35

## 2019-07-02 RX ADMIN — TEMOZOLOMIDE 10 MG: 5 CAPSULE ORAL at 21:30

## 2019-07-02 RX ADMIN — LACTULOSE 20 G: 20 SOLUTION ORAL at 08:37

## 2019-07-02 RX ADMIN — DIVALPROEX SODIUM 1500 MG: 125 CAPSULE, COATED PELLETS ORAL at 21:28

## 2019-07-02 RX ADMIN — FAMOTIDINE 20 MG: 20 TABLET, FILM COATED ORAL at 17:15

## 2019-07-02 RX ADMIN — QUETIAPINE FUMARATE 50 MG: 25 TABLET, FILM COATED ORAL at 21:25

## 2019-07-02 RX ADMIN — NICOTINE 1 PATCH: 21 PATCH, EXTENDED RELEASE TRANSDERMAL at 17:17

## 2019-07-02 RX ADMIN — LACOSAMIDE 150 MG: 50 TABLET, FILM COATED ORAL at 21:28

## 2019-07-02 RX ADMIN — DIVALPROEX SODIUM 1500 MG: 125 CAPSULE, COATED PELLETS ORAL at 14:59

## 2019-07-02 RX ADMIN — PREGABALIN 100 MG: 100 CAPSULE ORAL at 08:35

## 2019-07-02 RX ADMIN — ASPIRIN 81 MG: 81 TABLET, CHEWABLE ORAL at 08:35

## 2019-07-02 RX ADMIN — PREGABALIN 100 MG: 100 CAPSULE ORAL at 21:28

## 2019-07-02 RX ADMIN — DIVALPROEX SODIUM 1500 MG: 125 CAPSULE, COATED PELLETS ORAL at 05:36

## 2019-07-02 RX ADMIN — LEVETIRACETAM 1250 MG: 500 TABLET, FILM COATED ORAL at 08:36

## 2019-07-02 RX ADMIN — DEXAMETHASONE 4 MG: 4 TABLET ORAL at 08:35

## 2019-07-02 RX ADMIN — LEVETIRACETAM 1250 MG: 500 TABLET, FILM COATED ORAL at 23:10

## 2019-07-02 RX ADMIN — SODIUM CHLORIDE, PRESERVATIVE FREE 3 ML: 5 INJECTION INTRAVENOUS at 08:36

## 2019-07-02 RX ADMIN — FAMOTIDINE 20 MG: 20 TABLET, FILM COATED ORAL at 08:35

## 2019-07-02 RX ADMIN — TEMOZOLOMIDE 140 MG: 140 CAPSULE ORAL at 21:30

## 2019-07-02 RX ADMIN — LACOSAMIDE 150 MG: 50 TABLET, FILM COATED ORAL at 08:35

## 2019-07-02 RX ADMIN — ALPRAZOLAM 0.25 MG: 0.25 TABLET ORAL at 21:28

## 2019-07-02 NOTE — NURSING NOTE
"Palliative Care Nurse Note      Code Status and Medical Interventions:   Ordered at: 06/29/19 1500     Level Of Support Discussed With:    Patient     Code Status:    CPR     Medical Interventions (Level of Support Prior to Arrest):    Full       HPI:   54 y.o. male presented to [facility] on 6/29/2019 related to inoperable astrocytoma    Palliative Care Spoke With: patient and family:  Visit to provide support for family and patient in collaboration with palliative care team.  Patient siting up in chair eating breakfast. Responds to conversation but eyes and head positioned to right.  Able to feed himself breakfast with assist from his mom. Weakness to left side remains. Plans are to discharge home with HH and continue treatment. Mother request to notify patient's  that he was here.  Attempted to notify but no answer.  Will continue to attempt.  Denies any other needs from palliative care at this time.    Last documented vital signs.  /92 (BP Location: Right arm, Patient Position: Lying)   Pulse 68   Temp 96.7 °F (35.9 °C) (Axillary)   Resp 18   Ht 172.7 cm (68\")   Wt 84.6 kg (186 lb 9.6 oz)   SpO2 97%   BMI 28.37 kg/m²     Last documented ESAS and intervention.        Symptom Distress  Non-Pain Symptoms: yes/pos  Non-Pain Symptoms Intervened: yes  Pain Score: no pain   ESAS Tiredness Score: 5  ESAS Nausea Score: No nausea  ESAS Depression Score: 3  ESAS Anxiety Score: 3  ESAS Drowsiness Score: 2  ESAS Lack of Appetite Score: No lack of appetite  ESAS Wellbeing Score: 5  ESAS Dyspnea Score: No shortness of breath  ESAS Source of Information: patient, family caregiver, healthcare professional caregiver  Palliative Performance Scale Score: 30%  Last Bowel Movement: 06/30/19  Bowel movement in last 24-48 hrs?: yes     Interventions  Supportive Measures: active listening utilized, positive reinforcement provided  Family/Support System Care: support provided    Chart reviewed for " palliative metrics.    Dora Gay, RN  7/2/2019

## 2019-07-02 NOTE — PROGRESS NOTES
"Palliative Care Daily Progress Note     other Chart review    Code Status:   Code Status and Medical Interventions:   Ordered at: 06/29/19 1500     Level Of Support Discussed With:    Patient     Code Status:    CPR     Medical Interventions (Level of Support Prior to Arrest):    Full      Advanced Directives: Advance Directive Status: Patient does not have advance directive   Goals of Care: Ongoing.     S: Medical record reviewed. Events noted.  No palliative care needs identified.  Plans to continue palliative radiation and chemotherapy as previously ordered at this juncture per discussion on 7/1/2019.  Provided medical orders for scope of treatment (MOST) form and decision aid.  Plans to continue aggressive care measures.  Patient and family met with hospice on 6/30/2019.    O:       Intake/Output Summary (Last 24 hours) at 7/2/2019 0907  Last data filed at 7/1/2019 1931  Gross per 24 hour   Intake --   Output 1150 ml   Net -1150 ml       Diagnostics: Reviewed    Patient Active Problem List   Diagnosis   • HTN (hypertension)   • Seizures (CMS/HCC)   • Astrocytoma brain tumor (CMS/HCC)   • Anemia   • Hypoglycemia   • Left hemiparesis (CMS/HCC)   • Elevated transaminase level       Physical Exam:    /92 (BP Location: Right arm, Patient Position: Lying)   Pulse 68   Temp 96.7 °F (35.9 °C) (Axillary)   Resp 18   Ht 172.7 cm (68\")   Wt 84.6 kg (186 lb 9.6 oz)   SpO2 97%   BMI 28.37 kg/m²      Patient status: Disease state: Controlled with current treatments.  Functional status: Palliative Performance Scale Score: Performance 30% based on the following measures: Ambulation: Totally bed bound, Activity and Evidence of Disease: Unable to do any work, extensive evidence of disease, Self-Care: Total care required,  Intake: Reduced, LOC: Full, drowsy or confusion   ECOG Status(4) Completely disabled, unable to carry out self-care.  Totally confined to bed or chair.  Nutritional status: Albumin 3.50. Body mass " index is 28.37 kg/m².      Family support: The patient receives support from his mother and extended family..  POA/Healthcare surrogate-advance directive completed and on file     Impression/Problem List:     1. Inoperable grade 3 anaplastic astrocytoma   2.  Left-sided weakness  3.  Seizures  4.  Debility        Recommendations/Plan:  1. plan: Goals of care include CODE STATUS CPR\full interventions per attending.      2.  Palliative care encounter  -Plans to continue full care interventions, stereotactic radiation, and any adjuvants including chemotherapy as needed with hopes of gaining a little more time  per conversation on 7/1.  -Family and patient met with hospice liaison yesterday 6/30.  Plans to discharge home with home health and continue aggressive care measures.  -Advance directive completed on 7/1  -Provided medical orders for scope with treatment (MOST) form and decision aid to further delineate care goals.  Patient and family to further review.      Thank you for allowing us to participate in patient's plan of care. Palliative Care Team will follow patient as needed.     Time spent: 15 minutes spent reviewing medical and medication records, assessing and examining patient, discussing with family, answering questions, providing some guidance about a plan and documentation of care, and coordinating care with other healthcare members, with > 50% time spent face to face.       Laura Collins, APRN  7/2/2019

## 2019-07-02 NOTE — THERAPY EVALUATION
Acute Care - Physical Therapy Initial Evaluation  [facility]     Patient Name: Lukasz Savage  : 1965  MRN: [id number]  Today's Date: 2019   Onset of Illness/Injury or Date of Surgery: 19  Date of Referral to PT: 19  Referring Physician: Dr. Arroyo      Admit Date: 2019    Visit Dx:     ICD-10-CM ICD-9-CM   1. Hypoglycemia E16.2 251.2   2. Altered mental status, unspecified altered mental status type R41.82 780.97   3. Brain tumor (CMS/HCC) D49.6 239.6   4. Dysphagia, unspecified type R13.10 787.20   5. Seizures (CMS/HCC) R56.9 780.39   6. Astrocytoma brain tumor (CMS/HCC) C71.9 191.9   7. Left hemiparesis (CMS/HCC) G81.94 342.90   8. Impaired functional mobility, balance, gait, and endurance Z74.09 V49.89     Patient Active Problem List   Diagnosis   • HTN (hypertension)   • Seizures (CMS/HCC)   • Anemia   • Hypoglycemia   • Left hemiparesis (CMS/HCC)   • Elevated transaminase level   • Inoperable anaplastic glioma of brain      Past Medical History:   Diagnosis Date   • Anemia 2019   • Angio-edema 7/3/2017   • Anxiety    • Arthritis    • Cancer (CMS/HCC) 2019    Brain cancer diagnoised yesterday  Dr Trevino    • Clostridium difficile colitis    • Erectile dysfunction 10/6/2016   • GERD (gastroesophageal reflux disease)    • Gout    • HCAP (healthcare-associated pneumonia) 2017   • Hyperlipidemia    • Malignant hypertension    • MSSA (methicillin susceptible Staphylococcus aureus) infection 2017   • Obstructive sleep apnea    • S/P shoulder surgery 2018   • Seizures (CMS/HCC) 2017     Past Surgical History:   Procedure Laterality Date   • COLONOSCOPY     • CRANIOTOMY Right 4/15/2019    Procedure: CRANIOTOMY WITH BIOPSY RIGHT;  Surgeon: Dillon Trevino MD;  Location: [facility];  Service: Neurosurgery   • SHOULDER ARTHROSCOPY Left    • TRACHEOSTOMY N/A 2017    Procedure: TRACHEOSTOMY WITH TRACHEOSCOPY;  Surgeon: Jairon Pierson MD;  Location:   [facility];  Service:         PT ASSESSMENT (last 12 hours)      Physical Therapy Evaluation     Row Name 07/02/19 0800          PT Evaluation Time/Intention    Subjective Information  complains of;fatigue;pain  -MS (r) AL (t) MS (c)     Document Type  evaluation  -MS (r) AL (t) MS (c)     Mode of Treatment  physical therapy  -MS (r) AL (t) MS (c)     Row Name 07/02/19 0800          General Information    Patient Profile Reviewed?  yes  -MS (r) AL (t) MS (c)     Onset of Illness/Injury or Date of Surgery  06/29/19  -MS (r) AL (t) MS (c)     Referring Physician  Dr. Arroyo  -MS (r) AL (t) MS (c)     Patient Observations  alert;cooperative;agree to therapy  -MS (r) AL (t) MS (c)     Patient/Family Observations  Pt. mother was present at beginning and end  -MS (r) AL (t) MS (c)     General Observations of Patient  Pt in wright's position on RA in no apparant distress  -MS (r) AL (t) MS (c)     Prior Level of Function  independent:;all household mobility;gait;transfer;bed mobility;min assist:;community mobility;dependent:;driving  -MS (r) AL (t) MS (c)     Equipment Currently Used at Home  walker, rolling;cane, straight;shower chair  -MS (r) AL (t) MS (c)     Pertinent History of Current Functional Problem  L sided weakness. Pt has hx of hospitalizatoins due to non-operative brain neoplasm over the past few months. Dx: Anaplastic Astrocytoma  -MS (r) AL (t) MS (c)     Existing Precautions/Restrictions  fall  -MS (r) AL (t) MS (c)     Limitations/Impairments  safety/cognitive  -MS (r) AL (t) MS (c)     Equipment Issued to Patient  walker, tiffanie  -MS (r) AL (t) MS (c)     Risks Reviewed  patient:;LOB;nausea/vomiting;dizziness;increased discomfort  -MS (r) AL (t) MS (c)     Benefits Reviewed  patient:;improve function;increase strength;decrease pain;decrease risk of DVT;improve skin integrity;increase knowledge  -MS (r) AL (t) MS (c)     Barriers to Rehab  previous functional deficit  -MS (r) AL (t) MS (c)     Row Name  07/02/19 0800          Relationship/Environment    Primary Source of Support/Comfort  parent;child(alethea)  -MS (r) AL (t) MS (c)     Lives With  parent(s)  -MS (r) AL (t) MS (c)     Family Caregiver if Needed  child(alethea), adult;parent(s)  -MS (r) AL (t) MS (c)     Row Name 07/02/19 0800          Resource/Environmental Concerns    Current Living Arrangements  home/apartment/condo  -MS (r) AL (t) MS (c)     Row Name 07/02/19 0800          Home Main Entrance    Number of Stairs, Main Entrance  two  -MS (r) AL (t) MS (c)     Stair Railings, Main Entrance  none  -MS (r) AL (t) MS (c)     Stairs Comment, Main Entrance  Pt. stated multipe entrances with steps up to 5 but stated he normally uses back door with 2 steps.   -MS (r) AL (t) MS (c)     Row Name 07/02/19 0800          Cognitive Assessment/Interventions    Additional Documentation  Cognitive Assessment/Intervention (Group)  -MS (r) AL (t) MS (c)     Row Name 07/02/19 0800          Cognitive Assessment/Intervention- PT/OT    Affect/Mental Status (Cognitive)  WFL  -MS (r) AL (t) MS (c)     Orientation Status (Cognition)  oriented x 4;other (see comments) didn't know month but know season and year  -MS (r) AL (t) MS (c)     Follows Commands (Cognition)  follows two step commands;over 90% accuracy;repetition of directions required  -MS (r) AL (t) MS (c)     Cognitive Function (Cognitive)  safety deficit  -MS (r) AL (t) MS (c)     Safety Deficit (Cognitive)  mild deficit;ability to follow commands  -MS (r) AL (t) MS (c)     Personal Safety Interventions  fall prevention program maintained;gait belt;nonskid shoes/slippers when out of bed;supervised activity  -MS (r) AL (t) MS (c)     Row Name 07/02/19 0800          Safety Issues, Functional Mobility    Safety Issues Affecting Function (Mobility)  ability to follow commands  -MS (r) AL (t) MS (c)     Impairments Affecting Function (Mobility)  endurance/activity tolerance;strength;pain;coordination;cognition  -MS (r) AL (t)  MS (c)     Row Name 07/02/19 0800          Bed Mobility Assessment/Treatment    Bed Mobility Assessment/Treatment  supine-sit  -MS (r) AL (t) MS (c)     Supine-Sit [facility] (Bed Mobility)  minimum assist (75% patient effort)  -MS (r) AL (t) MS (c)     Bed Mobility, Safety Issues  decreased use of arms for pushing/pulling  -MS (r) AL (t) MS (c)     Assistive Device (Bed Mobility)  head of bed elevated  -MS (r) AL (t) MS (c)     Row Name 07/02/19 0800          Transfer Assessment/Treatment    Transfer Assessment/Treatment  sit-stand transfer;stand-sit transfer  -MS (r) AL (t) MS (c)     Sit-Stand [facility] (Transfers)  contact guard  -MS (r) AL (t) MS (c)     Stand-Sit [facility] (Transfers)  contact guard  -MS (r) AL (t) MS (c)     Row Name 07/02/19 0800          Sit-Stand Transfer    Assistive Device (Sit-Stand Transfers)  walker, tiffanie  -MS (r) AL (t) MS (c)     Row Name 07/02/19 0800          Stand-Sit Transfer    Assistive Device (Stand-Sit Transfers)  walker, tiffanie  -MS (r) AL (t) MS (c)     Row Name 07/02/19 0800          Gait/Stairs Assessment/Training    [facility] Level (Gait)  contact guard  -MS (r) AL (t) MS (c)     Assistive Device (Gait)  walker, tiffanie  -MS (r) AL (t) MS (c)     Distance in Feet (Gait)  Pt. ambulated 20ft x2 to bathroom and back with decreased gait speed, shuffling gait, and impaired coordination with feet advancement and tiffanie-walker use.   -MS (r) AL (t) MS (c)     Comment (Gait/Stairs)  Pt. ambulated 20ft in 3-4 minutes.   -MS (r) AL (t) MS (c)     Row Name 07/02/19 0800          General ROM    GENERAL ROM COMMENTS  R UE and B LE AROM WFL. L UE PROM WFL  -MS (r) AL (t) MS (c)     Row Name 07/02/19 0800          MMT (Manual Muscle Testing)    General MMT Comments  R UE 4+/5, L UE 1/5, R LE 4+/5 and L LE 4/5.   -MS (r) AL (t) MS (c)     Row Name 07/02/19 0800          Motor Assessment/Intervention    Additional Documentation  Gross Motor Coordination (Group);Balance (Group)  -MS  (r) AL (t) MS (c)     Row Name 07/02/19 0800          Gross Motor Coordination    Gross Motor Skill, Impairments Detail  L Sided reflexes stronger than R side with tricep, bicep, brachioradialis, knee, and achilles reflexes.   -MS (r) AL (t) MS (c)     Row Name 07/02/19 0800          Balance    Balance  static sitting balance;static standing balance  -MS (r) AL (t) MS (c)     Row Name 07/02/19 0800          Static Sitting Balance    Level of [place] (Unsupported Sitting, Static Balance)  conditional independence  -MS (r) AL (t) MS (c)     Sitting Position (Unsupported Sitting, Static Balance)  sitting on edge of bed  -MS (r) AL (t) MS (c)     Time Able to Maintain Position (Unsupported Sitting, Static Balance)  3 to 4 minutes  -MS (r) AL (t) MS (c)     Comment (Unsupported Sitting, Static Balance)  Use of R UE to remain upright  -MS (r) AL (t) MS (c)     Row Name 07/02/19 0800          Static Standing Balance    Level of [place] (Supported Standing, Static Balance)  contact guard assist  -MS (r) AL (t) MS (c)     Time Able to Maintain Position (Supported Standing, Static Balance)  1 to 2 minutes  -MS (r) AL (t) MS (c)     Assistive Device Utilized (Supported Standing, Static Balance)  walker, tiffanie  -MS (r) AL (t) MS (c)     Row Name 07/02/19 0800          Sensory Assessment/Intervention    Sensory General Assessment  no sensation deficits identified  -MS (r) AL (t) MS (c)     Row Name 07/02/19 0800          Vision Assessment/Intervention    Visual Motor Impairment  visual tracking, left;visual tracking, up  -MS (r) AL (t) MS (c)     Visual Processing Deficit  tiffanie-inattention/neglect, left;other (see comments) moderate  -MS (r) AL (t) MS (c)     Row Name 07/02/19 0800          Pain Assessment    Additional Documentation  Pain Scale: Numbers Pre/Post-Treatment (Group)  -MS (r) AL (t) MS (c)     Row Name 07/02/19 0800          Pain Scale: Numbers Pre/Post-Treatment    Pain Scale: Numbers, Pretreatment  4/10   -MS (r) AL (t) MS (c)     Pain Scale: Numbers, Post-Treatment  4/10  -MS (r) AL (t) MS (c)     Pain Location - Orientation  lower  -MS (r) AL (t) MS (c)     Pain Location  back  -MS (r) AL (t) MS (c)     Pain Intervention(s)  Repositioned;Ambulation/increased activity  -MS (r) AL (t) MS (c)     Row Name 07/02/19 0800          Plan of Care Review    Plan of Care Reviewed With  patient  -MS (r) AL (t) MS (c)     Row Name 07/02/19 0800          Physical Therapy Clinical Impression    Date of Referral to PT  07/01/19  -MS (r) AL (t) MS (c)     Patient/Family Goals Statement (PT Clinical Impression)  Pt. stated to be able to walk at home  -MS (r) AL (t) MS (c)     Criteria for Skilled Interventions Met (PT Clinical Impression)  yes  -MS (r) AL (t) MS (c)     Pathology/Pathophysiology Noted (Describe Specifically for Each System)  neuromuscular  -MS (r) AL (t) MS (c)     Impairments Found (describe specific impairments)  aerobic capacity/endurance;arousal, attention, and cognition;gait, locomotion, and balance;muscle performance  -MS (r) AL (t) MS (c)     Rehab Potential (PT Clinical Summary)  good, to achieve stated therapy goals  -MS (r) AL (t) MS (c)     Predicted Duration of Therapy (PT)  until d/c  -MS (r) AL (t) MS (c)     Care Plan Review (PT)  evaluation/treatment results reviewed;care plan/treatment goals reviewed;risks/benefits reviewed;patient/other agree to care plan  -MS (r) AL (t) MS (c)     Row Name 07/02/19 0800          Physical Therapy Goals    Bed Mobility Goal Selection (PT)  bed mobility, PT goal 1  -MS (r) AL (t) MS (c)     Transfer Goal Selection (PT)  transfer, PT goal 1  -MS (r) AL (t) MS (c)     Gait Training Goal Selection (PT)  gait training, PT goal 1  -MS (r) AL (t) MS (c)     Stairs Goal Selection (PT)  stairs, PT goal 1  -MS (r) AL (t) MS (c)     Additional Documentation  Stairs Goal Selection (PT) (Row)  -MS (r) AL (t) MS (c)     Row Name 07/02/19 0800          Bed Mobility Goal 1 (PT)     Activity/Assistive Device (Bed Mobility Goal 1, PT)  sit to supine;supine to sit  -MS (r) AL (t) MS (c)     [place] Level/Cues Needed (Bed Mobility Goal 1, PT)  conditional independence  -MS (r) AL (t) MS (c)     Time Frame (Bed Mobility Goal 1, PT)  long term goal (LTG);by discharge  -MS (r) AL (t) MS (c)     Progress/Outcomes (Bed Mobility Goal 1, PT)  goal ongoing  -MS (r) AL (t) MS (c)     Row Name 07/02/19 0800          Transfer Goal 1 (PT)    Activity/Assistive Device (Transfer Goal 1, PT)  sit-to-stand/stand-to-sit;bed-to-chair/chair-to-bed  -MS (r) AL (t) MS (c)     [place] Level/Cues Needed (Transfer Goal 1, PT)  supervision required  -MS (r) AL (t) MS (c)     Time Frame (Transfer Goal 1, PT)  long term goal (LTG);by discharge  -MS (r) AL (t) MS (c)     Progress/Outcome (Transfer Goal 1, PT)  goal ongoing  -MS (r) AL (t) MS (c)     Row Name 07/02/19 0800          Gait Training Goal 1 (PT)    Activity/Assistive Device (Gait Training Goal 1, PT)  gait (walking locomotion);assistive device use;backward stepping;decrease fall risk;forward stepping;improve balance and speed;increase endurance/gait distance;sidestepping;walker, tiffanie  -MS (r) AL (t) MS (c)     [place] Level (Gait Training Goal 1, PT)  supervision required  -MS (r) AL (t) MS (c)     Distance (Gait Goal 1, PT)  Pt. will ambulate 50ft on even surfaces being able to walk 20ft in 1 minute or less  -MS (r) AL (t) MS (c)     Time Frame (Gait Training Goal 1, PT)  long term goal (LTG);by discharge  -MS (r) AL (t) MS (c)     Progress/Outcome (Gait Training Goal 1, PT)  goal ongoing  -MS (r) AL (t) MS (c)     Row Name 07/02/19 0800          Stairs Goal 1 (PT)    Activity/Assistive Device (Stairs Goal 1, PT)  stairs, all skills;ascending stairs;descending stairs;step-to-step;decrease fall risk;walker, tiffanie;using handrail, left;other (see comments) do if plan is to go home  -MS (r) AL (t) MS (c)     [place] Level/Cues Needed (Stairs Goal  1, PT)  minimum assist (75% or more patient effort)  -MS (r) AL (t) MS (c)     Number of Stairs (Stairs Goal 1, PT)  2  -MS (r) AL (t) MS (c)     Time Frame (Stairs Goal 1, PT)  long term goal (LTG);by discharge  -MS (r) AL (t) MS (c)     Progress/Outcome (Stairs Goal 1, PT)  goal ongoing  -MS (r) AL (t) MS (c)     Row Name 07/02/19 0800          Positioning and Restraints    Pre-Treatment Position  in bed  -MS (r) AL (t) MS (c)     Post Treatment Position  chair  -MS (r) AL (t) MS (c)     In Chair  call light within reach;encouraged to call for assist;with family/caregiver;sitting  -MS (r) AL (t) MS (c)     Row Name 07/02/19 0800          Living Environment    Home Accessibility  stairs to enter home  -MS (r) AL (t) MS (c)       User Key  (r) = Recorded By, (t) = Taken By, (c) = Cosigned By    Initials Name Provider Type    MS Reilly Silvina EMANUEL, PT, DPT, NCS Physical Therapist    Brandon Silver, PT Student PT Student        Physical Therapy Education     Title: PT OT SLP Therapies (Not Started)     Topic: Physical Therapy (In Progress)     Point: Mobility training (Done)     Learning Progress Summary           Patient Acceptance, LAZARO SANDOVAL DU by AL at 7/2/2019  9:20 AM    Comment:  PT educate patient on use of tiffanie-walker and using R UE to assist in bed mobility.                   Point: Precautions (Done)     Learning Progress Summary           Patient Acceptance, LAZARO SANDOVAL NR by AL at 7/2/2019  9:20 AM    Comment:  PT educated patient on fall risk and needing a staff member with him when he wants to get up and move.                               User Key     Initials Effective Dates Name Provider Type Discipline    AL 04/24/19 -  Brandon Phillips, PT Student PT Student PT              PT Recommendation and Plan  Anticipated Discharge Disposition (PT): home with assist, home with home health, transitional care, skilled nursing facility  Planned Therapy Interventions (PT Eval): balance training, bed mobility training, gait training,  stair training, strengthening, transfer training  Therapy Frequency (PT Clinical Impression): 2 times/day  Outcome Summary/Treatment Plan (PT)  Anticipated Equipment Needs at Discharge (PT): tiffanie walker  Anticipated Discharge Disposition (PT): home with assist, home with home health, transitional care, skilled nursing facility  Plan of Care Reviewed With: patient  Progress: improving  Outcome Summary: PT eval completed. Pt presented with L UE flaccid with 1/5 MMT and unable to grasp things. He performed bed mobility and OOB mobility with CGA/Alex. He presented with decreased strength, activity tolerance, gait speed, inattention to the left neededing verbal cues and tactile cues to look left, and with increased pain. He would benefit from continued skilled therapy to work on strengthening overall to help reduce assist for mobility and to help comensate for L UE weakness, to work on endurance to increase tolerance to activity and reduce fatigue, to work on gait speed and tiffanie-walker management to reduce fall risk and improve his independence, and to work on increasing attention to L side. PT recommends home with assist if he is able to perform 2 stairs. If not, PT recommends SNF or transitional care pending progress during stay for d/c.    Outcome Measures     Row Name 07/02/19 0800             How much help from another person do you currently need...    Turning from your back to your side while in flat bed without using bedrails?  3  -MS (r) AL (t) MS (c)      Moving from lying on back to sitting on the side of a flat bed without bedrails?  3  -MS (r) AL (t) MS (c)      Moving to and from a bed to a chair (including a wheelchair)?  3  -MS (r) AL (t) MS (c)      Standing up from a chair using your arms (e.g., wheelchair, bedside chair)?  3  -MS (r) AL (t) MS (c)      Climbing 3-5 steps with a railing?  2  -MS (r) AL (t) MS (c)      To walk in hospital room?  3  -MS (r) AL (t) MS (c)      AM-PAC 6 Clicks Score (PT)   17  -BS (r) AL (t)         Functional Assessment    Outcome Measure Options  AM-PAC 6 Clicks Basic Mobility (PT)  -MS (r) AL (t) MS (c)        User Key  (r) = Recorded By, (t) = Taken By, (c) = Cosigned By    Initials Name Provider Type    Silvina Jarrell, PT, DPT, NCS Physical Therapist    Ava Mccall, RN Registered Nurse    Brandon Silver, PT Student PT Student         Time Calculation:   PT Charges     Row Name 07/02/19 0921             Time Calculation    Start Time  0747 Includes chart time. PT went back in room at [id number] to reinforce getting up with staff and not by self due to request from tech.   -MS (r) AL (t) MS (c)      Stop Time  0853  -MS (r) AL (t) MS (c)      Time Calculation (min)  66 min  -MS (r) AL (t)      PT Non-Billable Time (min)  55 min  -MS (r) AL (t) MS (c)      PT Received On  07/02/19  -MS (r) AL (t) MS (c)      PT Goal Re-Cert Due Date  07/12/19  -MS (r) AL (t) MS (c)         Time Calculation- PT    Total Timed Code Minutes- PT  15 minute(s)  -MS (r) AL (t) MS (c)         Timed Charges    34133 - Gait Training Minutes   15  -MS (r) AL (t) MS (c)        User Key  (r) = Recorded By, (t) = Taken By, (c) = Cosigned By    Initials Name Provider Type    Silvina Jarrell EMANUEL, PT, DPT, NCS Physical Therapist    Brandon Silver, PT Student PT Student        Therapy Charges for Today     Code Description Service Date Service Provider Modifiers Qty    70976820608 HC PT EVAL MOD COMPLEXITY 4 7/2/2019 Brandon Phillips, PT Student GP 1    75053412330 HC GAIT TRAINING EA 15 MIN 7/2/2019 Brandon Phillips, PT Student GP 1          PT G-Codes  Outcome Measure Options: AM-PAC 6 Clicks Basic Mobility (PT)  AM-PAC 6 Clicks Score (PT): 17      BRANDON Phillips PT Student  7/2/2019

## 2019-07-02 NOTE — PROGRESS NOTES
[facility] Medicine Services  INPATIENT PROGRESS NOTE    Patient Name: Lukasz Savage  Date of Admission: 6/29/2019  Today's Date: 07/02/19  Length of Stay: 3  Primary Care Physician: Linda Hoffman, DNP, APRN    Subjective   Chief Complaint: Seizures.   HPI   The patient very much wants to go home today.  However, Dr. Romero wants him to remain in the hospital until after his radiation treatment tomorrow.  He will not undergo radiation from Thursday through Sunday secondary to the long holiday weekend.    His ammonia level has improved.  He was started on lactulose yesterday.  He does remain drowsy at times.    No further seizure activity.    Chandrika Bender brought him some donuts today.     Review of Systems   All pertinent negatives and positives are as above. All other systems have been reviewed and are negative unless otherwise stated.     Objective    Temp:  [96.7 °F (35.9 °C)-98.7 °F (37.1 °C)] 97.9 °F (36.6 °C)  Heart Rate:  [66-89] 74  Resp:  [18-19] 18  BP: (128-141)/() 141/104  Physical Exam  Constitutional: He is oriented to person, place, and time. He appears well-developed and well-nourished. In bed.  No distress. Less drowsy.  His mother is present with him. Discussed with his nurse, Yancy.     Head: Normocephalic and atraumatic.   Eyes: Conjunctivae and EOM are normal. Pupils are equal, round, and reactive to light.   Neck: Neck supple. No JVD present.   Cardiovascular: Normal rate, regular rhythm, normal heart sounds and intact distal pulses. Exam reveals no gallop.   Pulmonary/Chest: Effort normal and breath sounds normal. No respiratory distress. He exhibits no tenderness.   Abdominal: Soft. Bowel sounds are normal. He exhibits no distension. There is no tenderness. There is no rebound and no guarding.   Musculoskeletal: Normal range of motion. He exhibits no edema, tenderness or deformity.   Neurological: He is alert and oriented to person,  place, and time. He displays abnormal reflex. No cranial nerve deficit. He exhibits abnormal muscle tone.   0/5 strength in the left upper extremity.  4/5 strength of the left lower extremity.   Skin: Skin is warm and dry. No rash noted.   Psychiatric: Flat.      Results Review:  I have reviewed the labs, radiology results, and diagnostic studies.    Laboratory Data:   Results from last 7 days   Lab Units 07/02/19  0454 06/30/19  0634 06/29/19  0948   WBC 10*3/mm3 8.82 9.68 9.59   HEMOGLOBIN g/dL 13.7* 13.4* 12.0*   HEMATOCRIT % 41.7 39.9* 36.5*   PLATELETS 10*3/mm3 292 276 308     Results from last 7 days   Lab Units 07/02/19  0454 06/30/19  0634 06/29/19  0948   SODIUM mmol/L 142 141 142   POTASSIUM mmol/L 4.5 4.5 4.1   CHLORIDE mmol/L 108 111* 108   CO2 mmol/L 27.0 21.0* 23.0*   BUN mg/dL 34* 32* 33*   CREATININE mg/dL 1.40 1.25 1.37   CALCIUM mg/dL 8.8 8.8 8.9   BILIRUBIN mg/dL 0.5 0.5 0.4   ALK PHOS U/L 57 54 51   ALT (SGPT) U/L 151* 157* 173*   AST (SGOT) U/L 85* 54* 68*   GLUCOSE mg/dL 93 70 131*     I have reviewed the patient's current medications.     Assessment/Plan     Active Hospital Problems    Diagnosis   • **Left hemiparesis (CMS/HCC)   • Inoperable anaplastic glioma of brain    • Seizures (CMS/HCC)   • Elevated transaminase level   • Hypoglycemia   • Anemia   • HTN (hypertension)     Plan:  The patient was originally admitted on 6/29 by Dr. Encinas.  The patient has been undergoing radiation treatments for an inoperable anaplastic glioma.  He has also been on temozolomide.  He is actively being followed by Dr. Jessica, Dr. Romero, and Dr. Trevino as an outpatient. He presented with left-sided weakness.  This is felt to either be secondary to mass-effect or a Melchor's paralysis post seizure activity.  He has been admitted to the hospital several times recently for seizure activity.     Adjustments are being made to his antiepileptic drugs by Dr. Arroyo.     He continues on oral dexamethasone.       Huang felt that his elevated transaminases are secondary to antiepileptic drug treatment.  However, Temodar can also cause severe hepatotoxicity. Oncology has seen him.  His transaminases were normal prior to June 21.  His mother tells me that his Temodar therapy has been interrupted twice while he was in the hospital.  He continues on this for now.  Oncology notes showed that they will continue to monitor his transaminases and liver function as an outpatient.    Started lactulose for elevated ammonia on 7/1.       PT to see.     Palliative care has seen.  The patient and his mother wish to continue to be aggressive in his care at this point in time.     Discharge Planning: I expect the patient to be discharged to home tomorrow with home health and [facility].      Jairon Perez,    07/02/19   2:02 PM

## 2019-07-02 NOTE — PLAN OF CARE
Problem: Patient Care Overview  Goal: Plan of Care Review   07/02/19 0911   Coping/Psychosocial   Plan of Care Reviewed With patient   OTHER   Outcome Summary PT eval completed. Pt presented with L UE flaccid with 1/5 MMT and unable to grasp things. He performed bed mobility and OOB mobility with CGA/Alex. He presented with decreased strength, activity tolerance, gait speed, inattention to the left neededing verbal cues and tactile cues to look left, and with increased pain. He would benefit from continued skilled therapy to work on strengthening overall to help reduce assist for mobility and to help comensate for L UE weakness, to work on endurance to increase tolerance to activity and reduce fatigue, to work on gait speed and tiffanie-walker management to reduce fall risk and improve his independence, and to work on increasing attention to L side. PT recommends home with assist if he is able to perform 2 stairs. If not, PT recommends SNF or transitional care pending progress during stay for d/c.    Plan of Care Review   Progress improving

## 2019-07-02 NOTE — PLAN OF CARE
Problem: Skin Injury Risk (Adult)  Goal: Skin Health and Integrity  Outcome: Ongoing (interventions implemented as appropriate)      Problem: Fall Risk (Adult)  Goal: Absence of Fall  Outcome: Ongoing (interventions implemented as appropriate)      Problem: Patient Care Overview  Goal: Plan of Care Review  Outcome: Ongoing (interventions implemented as appropriate)   07/02/19 1813   Coping/Psychosocial   Plan of Care Reviewed With patient   OTHER   Outcome Summary Pt alert with confusion. Speech often garbled et difficult to understand. No c/o pain. Family at bedside et supportive of care. Will cont to monitor.    Plan of Care Review   Progress no change       Problem: Oncology Care (Adult)  Goal: Signs and Symptoms of Listed Potential Problems Will be Absent, Minimized or Managed (Oncology Care)  Outcome: Ongoing (interventions implemented as appropriate)      Problem: Seizure Disorder/Epilepsy (Adult)  Goal: Signs and Symptoms of Listed Potential Problems Will be Absent, Minimized or Managed (Seizure Disorder/Epilepsy)  Outcome: Ongoing (interventions implemented as appropriate)      Problem: Pain, Chronic (Adult)  Goal: Identify Related Risk Factors and Signs and Symptoms  Outcome: Ongoing (interventions implemented as appropriate)    Goal: Acceptable Pain/Comfort Level and Functional Ability  Outcome: Ongoing (interventions implemented as appropriate)

## 2019-07-02 NOTE — PROGRESS NOTES
Continued Stay Note   [place]     Patient Name: Lukasz Savage  MRN: [id number]  Today's Date: 7/2/2019    Admit Date: 6/29/2019    Discharge Plan     Row Name 07/02/19 1455       Plan    Plan Comments  HOSPITAL BED AND BSC HAVE BEEN ORDERED THRU [facility] AND HAVE BEEN DELIVERED TO PT HOME. PT MOTHER IS STILL PLANNING ON TAKE PT HOME WITH [facility] AT ID. WILL NEED  ORDERS TO ARRANGE. [facility] AWARE OF ANTICIPATED ORDERS.         Discharge Codes    No documentation.             MAYURI Cotto

## 2019-07-02 NOTE — PROGRESS NOTES
Neurology Progress Note      Date of admission: 6/29/2019  9:10 AM  Date of visit: 7/2/2019    Chief Complaint:  F/u left sided weakness    Subjective     Subjective:    Patient's mother at bedside again. She is concerned as multiple people have attempted palliative care conversations but she feels like Lukasz is aware of his prognosis but currently wants to aggressively treat it.  He wants to go home today; however, Dr. Romero has reportedly recommended that if he were to stay till tomorrow he could get one more round of XRT before the long holiday weekend.      Ammonia levels have fluctuated greatly over the last 24 hours and he did have lactulose for treatment.  Their sharp decline after so few of treatments makes me wonder if this was simply a lab artifact.    Medications:  Current Facility-Administered Medications   Medication Dose Route Frequency Provider Last Rate Last Dose   • ALPRAZolam (XANAX) tablet 0.25 mg  0.25 mg Oral BID PRN Cristino Encinas DO   0.25 mg at 06/30/19 2303   • aspirin chewable tablet 81 mg  81 mg Oral Daily Cristino Encinas DO   81 mg at 07/02/19 0835   • bisacodyl (DULCOLAX) suppository 10 mg  10 mg Rectal Daily PRN Cristino Encinas DO       • dexamethasone (DECADRON) tablet 4 mg  4 mg Oral TID With Meals Cristino Encinas DO   4 mg at 07/02/19 0835   • Divalproex Sodium (DEPAKOTE SPRINKLE) capsule 1,500 mg  1,500 mg Oral Q8H Cristino Encinas DO   1,500 mg at 07/02/19 0536   • famotidine (PEPCID) tablet 20 mg  20 mg Oral BID AC Jairon Perez DO   20 mg at 07/02/19 0835   • lacosamide (VIMPAT) tablet 150 mg  150 mg Oral Q12H Cristino Encinas DO   150 mg at 07/02/19 0835   • lactulose solution 20 g  20 g Oral Daily Jairon Perez DO   20 g at 07/02/19 0837   • levETIRAcetam (KEPPRA) tablet 1,250 mg  1,250 mg Oral BID Cristino Encinas DO   1,250 mg at 07/02/19 0836   • naloxone (NARCAN) injection 0.4 mg  0.4 mg Intravenous Q5 Min PRN Cristino Encinas  S, DO       • nicotine (NICODERM CQ) 21 MG/24HR patch 1 patch  1 patch Transdermal Q24H Cristino Encinas DO   1 patch at 07/01/19 1749   • ondansetron (ZOFRAN) injection 4 mg  4 mg Intravenous Q6H PRN Cristino Encinas DO   4 mg at 06/29/19 2045   • pregabalin (LYRICA) capsule 100 mg  100 mg Oral Q12H Doreen Zuniga MD   100 mg at 07/02/19 0835   • QUEtiapine (SEROquel) tablet 50 mg  50 mg Oral Nightly Cristino Encinas DO   50 mg at 07/01/19 2114   • sodium chloride 0.9 % flush 10 mL  10 mL Intravenous PRN Dillon Rios Jr., MD       • sodium chloride 0.9 % flush 3 mL  3 mL Intravenous Q12H Cristino Encinas DO   3 mL at 07/02/19 0836   • sodium chloride 0.9 % flush 3-10 mL  3-10 mL Intravenous PRN Cristino Encinas DO       • temozolomide (TEMODAR) chemo capsule 10 mg  10 mg Oral Nightly Cristino Encinas DO   10 mg at 07/01/19 2114    And   • temozolomide (TEMODAR) chemo capsule 140 mg  140 mg Oral Nightly Cristino Encinas DO   140 mg at 07/01/19 2114   • venlafaxine XR (EFFEXOR-XR) 24 hr capsule 150 mg  150 mg Oral Daily Cristino Encinas DO   150 mg at 07/02/19 0835       Review of Systems:   -A 14 point review of systems is completed and is negative except for persistent but improving left sided weakness and hallucintaions    Objective     Objective      Vital Signs  Temp:  [96.7 °F (35.9 °C)-98.7 °F (37.1 °C)] 96.7 °F (35.9 °C)  Heart Rate:  [66-89] 68  Resp:  [18-19] 18  BP: (128-143)/(79-94) 136/92    Physical Exam:    HEENT:  Neck supple  CVS:  Regular rate and rhythm.  No murmurs  Carotid Examination:  No bruits  Lungs:  Clear to auscultation  Abdomen:  Non-tender, Non-distended  Extremities:  No signs of peripheral edema    Neurologic Exam:    -Awake, Alert, Oriented to person and place  --Follows simple  commands    Cranial nerves II through XII intact. Pupils react. EOMI  No facial weakness    Motor: (strength out of 5:  1= minimal movement, 2 = movement in plane  of gravity, 3 = movement against gravity, 4 = movement against some resistance, 5 = full strength)    -Right Upper Ext: Proximal: 5 Distal: 5  -Left Upper Ext: Proximal:3+ Distal: 0    -Right Lower Ext: Proximal: 5 Distal: 5  -Left Lower Ext: Proximal: 4 Distal:2+    DTR:  3+ throughout on left and 2++ on right extremities    Sensory:  -Intact to light touch  Coordination/Gait:  -No ataxia but weakness contributing to abnormalities seen   Results Review:    I reviewed the patient's new clinical results.    Lab Results (last 24 hours)     Procedure Component Value Units Date/Time    Comprehensive Metabolic Panel [[id number]]  (Abnormal) Collected:  07/02/19 0454    Specimen:  Blood Updated:  07/02/19 0521     Glucose 93 mg/dL      BUN 34 mg/dL      Creatinine 1.40 mg/dL      Sodium 142 mmol/L      Potassium 4.5 mmol/L      Chloride 108 mmol/L      CO2 27.0 mmol/L      Calcium 8.8 mg/dL      Total Protein 6.3 g/dL      Albumin 3.50 g/dL      ALT (SGPT) 151 U/L      AST (SGOT) 85 U/L      Alkaline Phosphatase 57 U/L      Total Bilirubin 0.5 mg/dL      eGFR Non African Amer 53 mL/min/1.73      Globulin 2.8 gm/dL      A/G Ratio 1.3 g/dL      BUN/Creatinine Ratio 24.3     Anion Gap 7.0 mmol/L     Narrative:       GFR Normal >60  Chronic Kidney Disease <60  Kidney Failure <15    Ammonia [[id number]]  (Abnormal) Collected:  07/02/19 0455    Specimen:  Blood Updated:  07/02/19 0519     Ammonia <9 umol/L     CBC (No Diff) [[id number]]  (Abnormal) Collected:  07/02/19 0454    Specimen:  Blood Updated:  07/02/19 0505     WBC 8.82 10*3/mm3      RBC 4.71 10*6/mm3      Hemoglobin 13.7 g/dL      Hematocrit 41.7 %      MCV 88.5 fL      MCH 29.1 pg      MCHC 32.9 g/dL      RDW 16.3 %      RDW-SD 52.4 fl      MPV 9.5 fL      Platelets 292 10*3/mm3     POC Glucose Once [[id number]]  (Normal) Collected:  07/01/19 1043    Specimen:  Blood Updated:  07/01/19 1106     Glucose 87 mg/dL      Comment: : [id number] Ronaldo MatostonMeter ID:  [id number]           Imaging Results (last 24 hours)     ** No results found for the last 24 hours. **          Assessment/Plan     Hospital Problem List      Left hemiparesis (CMS/HCC)    HTN (hypertension)    Seizures (CMS/HCC)    Astrocytoma brain tumor (CMS/HCC)    Anemia    Hypoglycemia    Elevated transaminase level    Impression:  1. 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 Seizures on 4 antiepileptics but inadvertent reduction of Vimpat to a previous dose by mother  Per family he had a seizure this morning  3. Hallucinations while on Lyrica  It seemed to have been started at a fairly high dose.   4. Temporary elevated ammonia level while on Depakote with some       Seizures while on 4 anticonvulsants are not likely to be able to fully preventable    Plan:  · Reduce Lyrica to 100 mg Bid  · Maintain Vimpat at 150 mg bid as he has bradycardia when Vimpat is higher dose.   · Will continue to watch ammonia and LFT's while on Depakote  · Will check ammonia level in AM along with valproic acid level  · Will plan on D/C tomorrow after XRT if OK with primary team.      Harpreet Arroyo MD  07/02/19  9:53 AM

## 2019-07-02 NOTE — PLAN OF CARE
Problem: Skin Injury Risk (Adult)  Goal: Skin Health and Integrity  Outcome: Ongoing (interventions implemented as appropriate)      Problem: Fall Risk (Adult)  Goal: Absence of Fall  Outcome: Ongoing (interventions implemented as appropriate)      Problem: Patient Care Overview  Goal: Plan of Care Review  Outcome: Ongoing (interventions implemented as appropriate)   07/02/19 0240   Coping/Psychosocial   Plan of Care Reviewed With patient   OTHER   Outcome Summary Pt disoriented to time and place. No seizure activity noted. Gait very unsteady, difficulty ambulating, free from falls. Turns self, weak L side. Cont to monitor.    Plan of Care Review   Progress no change       Problem: Oncology Care (Adult)  Goal: Signs and Symptoms of Listed Potential Problems Will be Absent, Minimized or Managed (Oncology Care)  Outcome: Ongoing (interventions implemented as appropriate)      Problem: Seizure Disorder/Epilepsy (Adult)  Goal: Signs and Symptoms of Listed Potential Problems Will be Absent, Minimized or Managed (Seizure Disorder/Epilepsy)  Outcome: Ongoing (interventions implemented as appropriate)

## 2019-07-03 ENCOUNTER — RESULTS ENCOUNTER (OUTPATIENT)
Dept: FAMILY MEDICINE CLINIC | Facility: CLINIC | Age: 54
End: 2019-07-03

## 2019-07-03 VITALS
HEIGHT: 68 IN | SYSTOLIC BLOOD PRESSURE: 132 MMHG | OXYGEN SATURATION: 100 % | BODY MASS INDEX: 28.28 KG/M2 | TEMPERATURE: 97.8 F | RESPIRATION RATE: 16 BRPM | WEIGHT: 186.6 LBS | DIASTOLIC BLOOD PRESSURE: 94 MMHG | HEART RATE: 77 BPM

## 2019-07-03 DIAGNOSIS — E87.0 HYPERNATREMIA: ICD-10-CM

## 2019-07-03 PROBLEM — E72.20 HYPERAMMONEMIA (HCC): Status: ACTIVE | Noted: 2019-07-03

## 2019-07-03 LAB
ALBUMIN SERPL-MCNC: 2.7 G/DL (ref 3.5–5)
ALBUMIN/GLOB SERPL: 1.1 G/DL (ref 1.1–2.5)
ALP SERPL-CCNC: 70 U/L (ref 24–120)
ALT SERPL W P-5'-P-CCNC: 132 U/L (ref 0–54)
AMMONIA BLD-SCNC: 32 UMOL/L (ref 9–33)
ANION GAP SERPL CALCULATED.3IONS-SCNC: 7 MMOL/L (ref 4–13)
AST SERPL-CCNC: 48 U/L (ref 7–45)
BILIRUB SERPL-MCNC: 0.3 MG/DL (ref 0.1–1)
BUN BLD-MCNC: 36 MG/DL (ref 5–21)
BUN/CREAT SERPL: 31.6 (ref 7–25)
CALCIUM SPEC-SCNC: 8.8 MG/DL (ref 8.4–10.4)
CHLORIDE SERPL-SCNC: 110 MMOL/L (ref 98–110)
CO2 SERPL-SCNC: 24 MMOL/L (ref 24–31)
CREAT BLD-MCNC: 1.14 MG/DL (ref 0.5–1.4)
GFR SERPL CREATININE-BSD FRML MDRD: 67 ML/MIN/1.73
GLOBULIN UR ELPH-MCNC: 2.5 GM/DL
GLUCOSE BLD-MCNC: 127 MG/DL (ref 70–100)
POTASSIUM BLD-SCNC: 4.1 MMOL/L (ref 3.5–5.3)
PROT SERPL-MCNC: 5.2 G/DL (ref 6.3–8.7)
SODIUM BLD-SCNC: 141 MMOL/L (ref 135–145)
VALPROATE SERPL-MCNC: 85.1 MCG/ML (ref 50–100)

## 2019-07-03 PROCEDURE — 80053 COMPREHEN METABOLIC PANEL: CPT | Performed by: INTERNAL MEDICINE

## 2019-07-03 PROCEDURE — 77412 RADIATION TX DELIVERY LVL 3: CPT | Performed by: RADIOLOGY

## 2019-07-03 PROCEDURE — 97116 GAIT TRAINING THERAPY: CPT

## 2019-07-03 PROCEDURE — 99233 SBSQ HOSP IP/OBS HIGH 50: CPT | Performed by: PSYCHIATRY & NEUROLOGY

## 2019-07-03 PROCEDURE — 77336 RADIATION PHYSICS CONSULT: CPT | Performed by: RADIOLOGY

## 2019-07-03 PROCEDURE — 63710000001 DEXAMETHASONE PER 0.25 MG: Performed by: FAMILY MEDICINE

## 2019-07-03 PROCEDURE — 80164 ASSAY DIPROPYLACETIC ACD TOT: CPT | Performed by: PSYCHIATRY & NEUROLOGY

## 2019-07-03 PROCEDURE — 82140 ASSAY OF AMMONIA: CPT | Performed by: PSYCHIATRY & NEUROLOGY

## 2019-07-03 RX ORDER — LACTULOSE 20 G/30ML
20 SOLUTION ORAL DAILY
Qty: 946 ML | Refills: 1 | Status: ON HOLD | OUTPATIENT
Start: 2019-07-03 | End: 2020-01-01

## 2019-07-03 RX ORDER — PREGABALIN 100 MG/1
100 CAPSULE ORAL EVERY 12 HOURS SCHEDULED
Qty: 60 CAPSULE | Refills: 1 | Status: SHIPPED | OUTPATIENT
Start: 2019-07-03 | End: 2019-07-19 | Stop reason: HOSPADM

## 2019-07-03 RX ADMIN — FAMOTIDINE 20 MG: 20 TABLET, FILM COATED ORAL at 06:37

## 2019-07-03 RX ADMIN — DEXAMETHASONE 4 MG: 4 TABLET ORAL at 13:41

## 2019-07-03 RX ADMIN — DEXAMETHASONE 4 MG: 4 TABLET ORAL at 10:05

## 2019-07-03 RX ADMIN — PREGABALIN 100 MG: 100 CAPSULE ORAL at 10:04

## 2019-07-03 RX ADMIN — DIVALPROEX SODIUM 1500 MG: 125 CAPSULE, COATED PELLETS ORAL at 06:37

## 2019-07-03 RX ADMIN — DIVALPROEX SODIUM 1500 MG: 125 CAPSULE, COATED PELLETS ORAL at 13:42

## 2019-07-03 RX ADMIN — ASPIRIN 81 MG: 81 TABLET, CHEWABLE ORAL at 10:05

## 2019-07-03 RX ADMIN — LEVETIRACETAM 1250 MG: 500 TABLET, FILM COATED ORAL at 10:05

## 2019-07-03 RX ADMIN — LACTULOSE 20 G: 20 SOLUTION ORAL at 10:05

## 2019-07-03 RX ADMIN — VENLAFAXINE HYDROCHLORIDE 150 MG: 75 CAPSULE, EXTENDED RELEASE ORAL at 10:05

## 2019-07-03 RX ADMIN — LACOSAMIDE 150 MG: 50 TABLET, FILM COATED ORAL at 10:11

## 2019-07-03 NOTE — PROGRESS NOTES
Case Management Discharge Note    Final Note: Plan for dc home with [facility].  Home health had been arranged per PCP.  DME in home. BSC to be picked up at [place] [facility]. Mother to transport home.     Destination      No service has been selected for the patient.      Durable Medical Equipment      No service has been selected for the patient.      Dialysis/Infusion      No service has been selected for the patient.      Home Medical Care      No service has been selected for the patient.      Therapy      No service has been selected for the patient.      Community Resources      No service has been selected for the patient.             Final Discharge Disposition Code: 06 - home with home health care

## 2019-07-03 NOTE — PLAN OF CARE
Problem: Patient Care Overview  Goal: Plan of Care Review  Outcome: Ongoing (interventions implemented as appropriate)   07/03/19 9153   Coping/Psychosocial   Plan of Care Reviewed With patient;family   OTHER   Outcome Summary going home today, up with asst x1 with gait belt, no c/o pain, following all commands, left arm flaccid, speech clear today, mother given scripts and dc instructions   Plan of Care Review   Progress improving

## 2019-07-03 NOTE — PROGRESS NOTES
Neurology Progress Note      Date of admission: 6/29/2019  9:10 AM  Date of visit: 7/3/2019    Chief Complaint:  F/u left sided weakness    Subjective     Subjective:    No new issues today.  Received XRT this morning.  I had a long conversation this morning with the patient's mother and Mylene Bender, patient care advocate.  They want to sit down and better define goals of care.      Medications:  Current Facility-Administered Medications   Medication Dose Route Frequency Provider Last Rate Last Dose   • ALPRAZolam (XANAX) tablet 0.25 mg  0.25 mg Oral BID PRN Cristino Encinas DO   0.25 mg at 07/02/19 2128   • aspirin chewable tablet 81 mg  81 mg Oral Daily Cristino Encinas DO   81 mg at 07/03/19 1005   • bisacodyl (DULCOLAX) suppository 10 mg  10 mg Rectal Daily PRN Cristino Encinas DO       • dexamethasone (DECADRON) tablet 4 mg  4 mg Oral TID With Meals Cristino Encinas DO   4 mg at 07/03/19 1005   • Divalproex Sodium (DEPAKOTE SPRINKLE) capsule 1,500 mg  1,500 mg Oral Q8H Cristino Encinas DO   1,500 mg at 07/03/19 0637   • famotidine (PEPCID) tablet 20 mg  20 mg Oral BID AC Jairon Perez DO   20 mg at 07/03/19 0637   • lacosamide (VIMPAT) tablet 150 mg  150 mg Oral Q12H Cristino Encinas DO   150 mg at 07/03/19 1011   • lactulose solution 20 g  20 g Oral BID Jairon Perez DO   20 g at 07/03/19 1005   • levETIRAcetam (KEPPRA) tablet 1,250 mg  1,250 mg Oral BID Cristino Encinas DO   1,250 mg at 07/03/19 1005   • naloxone (NARCAN) injection 0.4 mg  0.4 mg Intravenous Q5 Min PRN Cristino Encinas DO       • nicotine (NICODERM CQ) 21 MG/24HR patch 1 patch  1 patch Transdermal Q24H Cristino Encinas DO   1 patch at 07/02/19 1717   • ondansetron (ZOFRAN) injection 4 mg  4 mg Intravenous Q6H PRN Cristino Encinas DO   4 mg at 06/29/19 2045   • pregabalin (LYRICA) capsule 100 mg  100 mg Oral Q12H Doreen Zuniga MD   100 mg at 07/03/19 1004   • QUEtiapine (SEROquel)  tablet 50 mg  50 mg Oral Nightly Cristino Encinas S, DO   50 mg at 07/02/19 2125   • sodium chloride 0.9 % flush 10 mL  10 mL Intravenous PRN Dillon Rios Jr., MD       • sodium chloride 0.9 % flush 3 mL  3 mL Intravenous Q12H Flor Encinasurice S, DO   3 mL at 07/02/19 0836   • sodium chloride 0.9 % flush 3-10 mL  3-10 mL Intravenous PRN Cristino Encinas S, DO       • temozolomide (TEMODAR) chemo capsule 10 mg  10 mg Oral Nightly Encinas, Cristino S, DO   10 mg at 07/02/19 2130    And   • temozolomide (TEMODAR) chemo capsule 140 mg  140 mg Oral Nightly Cristino Encinas S, DO   140 mg at 07/02/19 2130   • venlafaxine XR (EFFEXOR-XR) 24 hr capsule 150 mg  150 mg Oral Daily Cristino Encinas S, DO   150 mg at 07/03/19 1005       Review of Systems:   -A 14 point review of systems is completed and is negative except for persistent but improving left sided weakness and hallucintaions    Objective     Objective      Vital Signs  Temp:  [97.6 °F (36.4 °C)-98.3 °F (36.8 °C)] 97.9 °F (36.6 °C)  Heart Rate:  [68-86] 76  Resp:  [18-20] 18  BP: (114-148)/() 148/92    Physical Exam:    HEENT:  Neck supple  CVS:  Regular rate and rhythm.  No murmurs  Carotid Examination:  No bruits  Lungs:  Clear to auscultation  Abdomen:  Non-tender, Non-distended  Extremities:  No signs of peripheral edema    Neurologic Exam:    -Awake, Alert, Oriented to person and place  --Follows simple  commands    Cranial nerves II through XII intact. Pupils react. EOMI  No facial weakness    Motor: (strength out of 5:  1= minimal movement, 2 = movement in plane of gravity, 3 = movement against gravity, 4 = movement against some resistance, 5 = full strength)    -Right Upper Ext: Proximal: 5 Distal: 5  -Left Upper Ext: Proximal:3+ Distal: 0    -Right Lower Ext: Proximal: 5 Distal: 5  -Left Lower Ext: Proximal: 4 Distal:2+    DTR:  3+ throughout on left and 2++ on right extremities    Sensory:  -Intact to light touch  Coordination/Gait:  -No  ataxia but weakness contributing to abnormalities seen   Results Review:    I reviewed the patient's new clinical results.    Lab Results (last 24 hours)     Procedure Component Value Units Date/Time    Valproic Acid Level, Total [[id number]]  (Normal) Collected:  07/03/19 0541    Specimen:  Blood Updated:  07/03/19 0631     Valproic Acid 85.1 mcg/mL     Comprehensive Metabolic Panel [[id number]]  (Abnormal) Collected:  07/03/19 0541    Specimen:  Blood Updated:  07/03/19 0627     Glucose 127 mg/dL      BUN 36 mg/dL      Creatinine 1.14 mg/dL      Sodium 141 mmol/L      Potassium 4.1 mmol/L      Chloride 110 mmol/L      CO2 24.0 mmol/L      Calcium 8.8 mg/dL      Total Protein 5.2 g/dL      Albumin 2.70 g/dL      ALT (SGPT) 132 U/L      AST (SGOT) 48 U/L      Alkaline Phosphatase 70 U/L      Total Bilirubin 0.3 mg/dL      eGFR Non African Amer 67 mL/min/1.73      Globulin 2.5 gm/dL      A/G Ratio 1.1 g/dL      BUN/Creatinine Ratio 31.6     Anion Gap 7.0 mmol/L     Narrative:       GFR Normal >60  Chronic Kidney Disease <60  Kidney Failure <15    Ammonia [[id number]]  (Normal) Collected:  07/03/19 0541    Specimen:  Blood Updated:  07/03/19 0559     Ammonia 32 umol/L     Ammonia [[id number]]  (Normal) Collected:  07/02/19 1057    Specimen:  Blood Updated:  07/02/19 1136     Ammonia 24 umol/L      Comment: Specimen hemolyzed.  Results may be affected.           Imaging Results (last 24 hours)     ** No results found for the last 24 hours. **          Assessment/Plan     Hospital Problem List      Left hemiparesis (CMS/HCC)    HTN (hypertension)    Seizures (CMS/HCC)    Anemia    Hypoglycemia    Elevated transaminase level    Inoperable anaplastic glioma of brain     Impression:  1. 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 Seizures on 4 antiepileptics but inadvertent reduction of Vimpat to a previous dose by mother  Per family he had a seizure this morning  3. Hallucinations while on Lyrica   It seemed to have been started at a fairly high dose.   4. Temporary elevated ammonia level while on Depakote with some       Seizures while on 4 anticonvulsants are not likely to be able to fully preventable    Plan:  · Reduce Lyrica to 100 mg Bid  · Maintain Vimpat at 150 mg bid as he has bradycardia when Vimpat is higher dose.   · D/C today  · Will repeat Ammonia level and Valproic acid level on 7/9 and follow up in the neurology office on 7/10.  If ammonia level continues to worsen, will likely have to wean Depakote off and start Tegretol.      Harpreet Arroyo MD  07/03/19  10:31 AM

## 2019-07-03 NOTE — PLAN OF CARE
Problem: Skin Injury Risk (Adult)  Goal: Skin Health and Integrity  Outcome: Ongoing (interventions implemented as appropriate)      Problem: Fall Risk (Adult)  Goal: Absence of Fall  Outcome: Ongoing (interventions implemented as appropriate)      Problem: Patient Care Overview  Goal: Plan of Care Review  Outcome: Ongoing (interventions implemented as appropriate)   07/03/19 0315   Coping/Psychosocial   Plan of Care Reviewed With patient   OTHER   Outcome Summary Pt alert, confused at times. Up to BR w assist, free from falls. Slurred speech at times. No seizure activity. Cont to monitor.    Plan of Care Review   Progress no change       Problem: Oncology Care (Adult)  Goal: Signs and Symptoms of Listed Potential Problems Will be Absent, Minimized or Managed (Oncology Care)  Outcome: Ongoing (interventions implemented as appropriate)      Problem: Seizure Disorder/Epilepsy (Adult)  Goal: Signs and Symptoms of Listed Potential Problems Will be Absent, Minimized or Managed (Seizure Disorder/Epilepsy)  Outcome: Ongoing (interventions implemented as appropriate)

## 2019-07-03 NOTE — PROGRESS NOTES
Progress Note    Patient name: Lukasz Savage  Patient : 1965  VISIT # [id number]  MR #[id number]  Room: 332    Subjective  Speech and cognition has improved.  Now able to move/raise left arm.  Continues to have weakness on left side.  Denies pain.  Anticipating discharge today after radiation therapy.  Dr. Arroyo has adjusted antiepileptics regimen dosings.  Ammonia level normalized.      HISTORY OF PRESENT ILLNESS:    Mr. Lukasz Savage is a pleasant 39-year-old  gentleman with inoperable anaplastic astrocytoma who is presently receiving treatment with chemoradiation therapy with Temodar. Lukasz was recently admitted on 2 separate ooccasion for seizure activity. He was admitted from 2019-2019 after experiencing new onset of seizure activity requiring intubation and sedation. He was evaluated by Dr. Arroyo and placed on antiepileptic medications, Vimpat, Keppra and Depakote. Lukasz was then admitted to [facility] for refractory local seizures of the left extremities from 2019-2019. He was evaluated by Dr. Hall and placed on a fourth antiepileptic medication, Lyrica.      Lukasz was last seen in clinic on 2019 with left-sided weakness and new onset of intermittent hallucinations. He is presently readmitted to [facility] for hypoglycemia, lethargy and left-sided weakness.        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 Doyle on 2019 referred by Dr. Trevino for diagnosis of primary brain tumor, grade 3 astrocytoma. Lukasz was being seen by neurology with complaints of left facial and upper extremity.  · 2019-MRI brain with contrast showed changes in  the frontal convexity with a fullness of the sulcal gyral pattern. Specifically, 4.5 x 4.5 x 3.5 cm right frontal lesion. Second, discrete hyperintense nodule measuring 1.1 x 1.9 x 1.5 cm in the subcortical white matter of the paramedian posterior right frontal lobe immediately above the corpus callosum. This was concerning for high-grade glioma.   · 4/15/2019-he underwent a craniotomy with stereotactic biopsy by Dr. Dillon Trevino at [facility]. 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 Further molecular analysis at [facility] revealed IDH1/2 wild type, MGMT non-methylated, TERT mutation. Complex cytogenetics changes A maximum safe resection was not possible due to concerns of significant sequela. Second opinion was recommended at the [facility].   · 5/17/2019-he was seen by Dr. Oral Jessica. Recommended concurrent chemoradiation.   · 5/24/2019-initial oncology consultation, Dr. Doyle recommended concurrent chemoradiation with Temodar.   · 6/3/2019. CT scan of the head without contrast documented to ill-defined masslike areas of increased attenuation within the right frontal lobe. Largest measuring 3.3 cm, previously measuring 1.8 cm and second lesion in the lateral aspect of the right frontal lobe measuring up to 1.5 cm. No intracranial hemorrhage or midline shift.   · 6/3/2019- MRI of the brain with and without contrast, compared to 4/2/2019 documented masslike appearance at the right frontal lobe measuring 2.9 x 1.4 cm, previously measuring 1.7 x 1.1 cm with mass effect and possible extension into the corpus callosum. No midline shift.   · 6/14/2019-CT scan cervical spine without contrast documented no evidence of acute bony injury. Multilevel disc degeneration spondylosis.   · 6/12/2019- Initiated Temodar along with radiation therapy   · 6/17/2019- MRI of the brain with and without contrast documented 3 cm enhancing, partially necrotic tumor in the both the  right corpus callosum body, consistent with known astrocytoma. Additional FLAIR signal on both sides of the right central sulcus with faint contrast enhancement in the precentral gyrus, compatible with tumor extension. No hemorrhage or brain herniation.   · 6/21/2019 - CT scan of the head without contrast hypodense focus of the right frontal lobe measuring 3.3 cm. No intracranial hemorrhage. No abnormal extra-axial fluid collection. Right frontal craniotomy changes.           REVIEW OF SYSTEMS:   History obtained from chart review and the patient  General: positive for  - fatigue and left-sided weakness  ENT: negative for - oral lesions  Allergy and Immunology: negative   Hematological and Lymphatic: positive for - weight loss  Respiratory: negative for - cough and shortness of breath  Cardiovascular: negative for - chest pain or palpitations  Gastrointestinal: positive for - appetite loss and diarrhea  Genito-Urinary: negative for - dysuria or hematuria  Musculoskeletal: positive for - generalized weakness, L>R sided weakness  Neurological: negative for - confusion, dizziness or headaches  Dermatological: negative for - pruritus and rash    Objective     Vital Signs  Temp:  [96.7 °F (35.9 °C)-98.3 °F (36.8 °C)] 98.2 °F (36.8 °C)  Heart Rate:  [68-86] 68  Resp:  [18-20] 20  BP: (114-148)/() 130/92    Intake/Output Summary (Last 24 hours) at 7/3/2019 0715  Last data filed at 7/2/2019 1730  Gross per 24 hour   Intake 200 ml   Output 300 ml   Net -100 ml       PHYSICAL EXAM:  General Appearance: Arouses easily, cooperative, in no acute distress  Head: Normocephalic, without obvious abnormality, atraumatic  Eyes: Normal conjunctivae and sclerae normal, anicteric  Ears: Ears appear intact with no abnormalities noted, hearing grossly normal  Throat: No oral lesions, no thrush, oral mucosa moist  Neck: No adenopathy, supple, trachea midline, no JVD  Lungs: Clear to auscultation, respirations regular, even and  unlabored  Heart: Regular rhythm and normal rate, no murmur  Abdomen: Normal bowel sounds, no masses, no organomegaly, soft non-tender, non-distended  Genitalia: Deferred  Extremities: Moves all extremities with left-sided weakness, no edema, no cyanosis, no redness  Skin: No bleeding or rash  Lymph nodes: No palpable adenopathy  Neurologic: Grossly intact though not formally tested.  Speech improved and responding appropriately.        CBC  Results from last 7 days   Lab Units 07/02/19  0454 06/30/19  0634 06/29/19  0948   WBC 10*3/mm3 8.82 9.68 9.59   HEMOGLOBIN g/dL 13.7* 13.4* 12.0*   HEMATOCRIT % 41.7 39.9* 36.5*   PLATELETS 10*3/mm3 292 276 308       CMP  Lab Results   Component Value Date    GLUCOSE 127 (H) 07/03/2019    BUN 36 (H) 07/03/2019    CREATININE 1.14 07/03/2019    EGFRIFNONA 67 07/03/2019    EGFRIFAFRI 68 06/27/2019    BCR 31.6 (H) 07/03/2019    CO2 24.0 07/03/2019    CALCIUM 8.8 07/03/2019    PROTENTOTREF 6.1 06/27/2019    ALBUMIN 2.70 (L) 07/03/2019    LABIL2 1.3 06/27/2019    AST 48 (H) 07/03/2019     (H) 07/03/2019               Assessment/Plan       #1 Inoperable anaplastic glioma  · Continue chemoradiation therapy with Temodar 150 mg daily, will be completing 16 of 30 fractions of SBRT today  · Palliative care assisting  · CT scan of the head without contrast on 6/29/2019 redemonstrated 2 hyperattenuating masses in the right frontal lobe with ill-defined margins that appear similar compared to MRI of the brain on 6/17/2019 and CT of the head on 6/21/2019. Largest lesion measures 3.2 cm and shows mild central necrosis.     #2 Elevated transaminase level, improving and suspect secondary to Temodar  · , AST 91, alkaline phosphatase 93, and total bilirubin 0.2 on 6/26/2019  ·  and AST 48 on 7/3/2019, continues to improve     #3 Seizure activity with residual left-sided weakness  · Neurology assisting and managing antiepileptics with dose adjustments made on 7/2/2019  (Depakote, Vimpat, Keppra and Lyrica) and dexamethasone dosing (Decadron 4 mg PO TID)    · Ammonia level 32, normalized     Lukasz is eager to be discharged home and anticipating after radiation therapy this morning.  Agree with the current plan of care.  Will continue monitoring ALT as outpatient as scheduled.  Durable medical equipment was delivered to Lukasz's home yesterday.  Anticipating home health with [facility], arrangements to be completed prior to discharge.  Follow-up appointment has been arranged, 7/9/2019 at 9:30 AM    EPI Ferguson  07/03/19  7:15 AM    I personally saw and examined this patient, performing a face-to-face diagnostic evaluation with plan of care reviewed and developed with  MICHELE Ferguson and nursing staff.   I have addended and/or modified the above history of present illness, physical examination, and assessment and plan to reflect my findings and impressions.   Essential elements of the care plan were discussed with  MICHELE Ferguson .   Agree with findings and assessment/plan as documented above.  Questions were encouraged, asked and answered to their understanding and satisfaction.    Delio Romero MD  7/3/2019 7:37 AM

## 2019-07-03 NOTE — DISCHARGE SUMMARY
[facility] Medicine Services  DISCHARGE SUMMARY       Date of Admission: 6/29/2019  Date of Discharge:  7/3/2019  Primary Care Physician: Linda Hoffman, SUSHMA, APRN    Presenting Problem/History of Present Illness:  Seizure.     Final Discharge Diagnoses:  Active Hospital Problems    Diagnosis   • **Left hemiparesis (CMS/HCC)   • Inoperable anaplastic glioma of brain    • Seizures (CMS/HCC)   • Hyperammonemia (CMS/HCC)   • Elevated transaminase level   • Hypoglycemia   • Anemia   • HTN (hypertension)     Consults:   1. Dr. Romero (covering for Claduino) with oncology.   2. Dr. Jessica with XRT.   3. Dr. Simmons and Dr. Arroyo with neurology.   4. EPI Osei with palliative care.     Procedures Performed: None.     Pertinent Test Results:   Imaging Results (last 7 days)     Procedure Component Value Units Date/Time    CT Head Without Contrast [[id number]] Collected:  06/29/19 1116     Updated:  06/29/19 1126    Narrative:       EXAMINATION: CT HEAD WO CONTRAST- 6/29/2019 11:16 AM CDT     HISTORY: Altered mental status, h/o brain tumor, anaplastic astrocytoma.     DOSE: 714 mGycm (Automatic exposure control technique was implemented in  an effort to keep the radiation dose as low as possible without  compromising image quality)     REPORT: Spiral CT of the head was performed without contrast,  reconstructed coronal and sagittal images were also reviewed.     COMPARISON: CT of the head without contrast 06/21/2019 and brain MRI  with and without contrast 06/17/2019.     There is a small craniotomy defect over the right parietal region as  before, there are 2 hyperattenuating masses in the right frontal lobe  with ill-defined margins that appear similar to the previous CT. The  largest is seen adjacent to the falx on image 19 of series 3 measures up  to 3.2 cm, essentially unchanged compared with the previous CT. There is  a small area of probable central  necrosis within this larger mass. The  second more lateral lesion is difficult to measure and appears  elongated, extending superiorly into the right frontal region. This also  appears to be stable.There are no definite new lesions. No intracranial  hemorrhage is identified. The ventricles and basal cisterns appear  normal.       Impression:       1. No acute intracranial abnormality, there are ill-defined masses in  the right frontal lobe compatible the history of brain neoplasm, similar  to the previous head CT. The largest measures about 3.2 cm and shows  mild central necrosis.  This report was finalized on 06/29/2019 11:23 by Dr. Marvin Mar MD.        Lab Results (last 7 days)     Procedure Component Value Units Date/Time    Valproic Acid Level, Total [[id number]]  (Normal) Collected:  07/03/19 0541    Specimen:  Blood Updated:  07/03/19 0631     Valproic Acid 85.1 mcg/mL     Comprehensive Metabolic Panel [[id number]]  (Abnormal) Collected:  07/03/19 0541    Specimen:  Blood Updated:  07/03/19 0627     Glucose 127 mg/dL      BUN 36 mg/dL      Creatinine 1.14 mg/dL      Sodium 141 mmol/L      Potassium 4.1 mmol/L      Chloride 110 mmol/L      CO2 24.0 mmol/L      Calcium 8.8 mg/dL      Total Protein 5.2 g/dL      Albumin 2.70 g/dL      ALT (SGPT) 132 U/L      AST (SGOT) 48 U/L      Alkaline Phosphatase 70 U/L      Total Bilirubin 0.3 mg/dL      eGFR Non African Amer 67 mL/min/1.73      Globulin 2.5 gm/dL      A/G Ratio 1.1 g/dL      BUN/Creatinine Ratio 31.6     Anion Gap 7.0 mmol/L     Narrative:       GFR Normal >60  Chronic Kidney Disease <60  Kidney Failure <15    Ammonia [[id number]]  (Normal) Collected:  07/03/19 0541    Specimen:  Blood Updated:  07/03/19 0559     Ammonia 32 umol/L     Ammonia [[id number]]  (Normal) Collected:  07/02/19 1057    Specimen:  Blood Updated:  07/02/19 1136     Ammonia 24 umol/L      Comment: Specimen hemolyzed.  Results may be affected.       Comprehensive Metabolic Panel  [[id number]]  (Abnormal) Collected:  07/02/19 0454    Specimen:  Blood Updated:  07/02/19 0521     Glucose 93 mg/dL      BUN 34 mg/dL      Creatinine 1.40 mg/dL      Sodium 142 mmol/L      Potassium 4.5 mmol/L      Chloride 108 mmol/L      CO2 27.0 mmol/L      Calcium 8.8 mg/dL      Total Protein 6.3 g/dL      Albumin 3.50 g/dL      ALT (SGPT) 151 U/L      AST (SGOT) 85 U/L      Alkaline Phosphatase 57 U/L      Total Bilirubin 0.5 mg/dL      eGFR Non African Amer 53 mL/min/1.73      Globulin 2.8 gm/dL      A/G Ratio 1.3 g/dL      BUN/Creatinine Ratio 24.3     Anion Gap 7.0 mmol/L     Narrative:       GFR Normal >60  Chronic Kidney Disease <60  Kidney Failure <15    Ammonia [[id number]]  (Abnormal) Collected:  07/02/19 0455    Specimen:  Blood Updated:  07/02/19 0519     Ammonia <9 umol/L     CBC (No Diff) [[id number]]  (Abnormal) Collected:  07/02/19 0454    Specimen:  Blood Updated:  07/02/19 0505     WBC 8.82 10*3/mm3      RBC 4.71 10*6/mm3      Hemoglobin 13.7 g/dL      Hematocrit 41.7 %      MCV 88.5 fL      MCH 29.1 pg      MCHC 32.9 g/dL      RDW 16.3 %      RDW-SD 52.4 fl      MPV 9.5 fL      Platelets 292 10*3/mm3     Ammonia [[id number]]  (Abnormal) Collected:  06/30/19 2311    Specimen:  Blood Updated:  06/30/19 2335     Ammonia 154 umol/L     CBC Auto Differential [[id number]]  (Abnormal) Collected:  06/30/19 0634    Specimen:  Blood Updated:  06/30/19 0707     WBC 9.68 10*3/mm3      RBC 4.63 10*6/mm3      Hemoglobin 13.4 g/dL      Hematocrit 39.9 %      MCV 86.2 fL      MCH 28.9 pg      MCHC 33.6 g/dL      RDW 16.2 %      RDW-SD 49.6 fl      MPV 9.7 fL      Platelets 276 10*3/mm3      Neutrophil % 58.9 %      Lymphocyte % 31.6 %      Monocyte % 5.5 %      Eosinophil % 0.1 %      Basophil % 0.6 %      Immature Grans % 3.3 %      Neutrophils, Absolute 5.70 10*3/mm3      Lymphocytes, Absolute 3.06 10*3/mm3      Monocytes, Absolute 0.53 10*3/mm3      Eosinophils, Absolute 0.01 10*3/mm3      Basophils, Absolute  0.06 10*3/mm3      Immature Grans, Absolute 0.32 10*3/mm3      nRBC 0.0 /100 WBC     Ammonia [[id number]]  (Abnormal) Collected:  06/30/19 0634    Specimen:  Blood Updated:  06/30/19 0658     Ammonia 37 umol/L     Comprehensive Metabolic Panel [[id number]]  (Abnormal) Collected:  06/30/19 0634    Specimen:  Blood Updated:  06/30/19 0656     Glucose 70 mg/dL      BUN 32 mg/dL      Creatinine 1.25 mg/dL      Sodium 141 mmol/L      Potassium 4.5 mmol/L      Chloride 111 mmol/L      CO2 21.0 mmol/L      Calcium 8.8 mg/dL      Total Protein 6.4 g/dL      Albumin 3.50 g/dL      ALT (SGPT) 157 U/L      AST (SGOT) 54 U/L      Alkaline Phosphatase 54 U/L      Total Bilirubin 0.5 mg/dL      eGFR Non African Amer 60 mL/min/1.73      Globulin 2.9 gm/dL      A/G Ratio 1.2 g/dL      BUN/Creatinine Ratio 25.6     Anion Gap 9.0 mmol/L     Narrative:       GFR Normal >60  Chronic Kidney Disease <60  Kidney Failure <15    Valproic Acid Level, Total [[id number]]  (Normal) Collected:  06/29/19 0948    Specimen:  Blood Updated:  06/29/19 1011     Valproic Acid 93.3 mcg/mL     Comprehensive Metabolic Panel [[id number]]  (Abnormal) Collected:  06/29/19 0948    Specimen:  Blood Updated:  06/29/19 1009     Glucose 131 mg/dL      BUN 33 mg/dL      Creatinine 1.37 mg/dL      Sodium 142 mmol/L      Potassium 4.1 mmol/L      Chloride 108 mmol/L      CO2 23.0 mmol/L      Calcium 8.9 mg/dL      Total Protein 6.4 g/dL      Albumin 3.50 g/dL      ALT (SGPT) 173 U/L      AST (SGOT) 68 U/L      Alkaline Phosphatase 51 U/L      Total Bilirubin 0.4 mg/dL      eGFR Non African Amer 54 mL/min/1.73      Globulin 2.9 gm/dL      A/G Ratio 1.2 g/dL      BUN/Creatinine Ratio 24.1     Anion Gap 11.0 mmol/L     Narrative:       GFR Normal >60  Chronic Kidney Disease <60  Kidney Failure <15    CBC Auto Differential [[id number]]  (Abnormal) Collected:  06/29/19 0948    Specimen:  Blood Updated:  06/29/19 0958     WBC 9.59 10*3/mm3      RBC 4.20 10*6/mm3       Hemoglobin 12.0 g/dL      Hematocrit 36.5 %      MCV 86.9 fL      MCH 28.6 pg      MCHC 32.9 g/dL      RDW 16.9 %      RDW-SD 53.3 fl      MPV 9.5 fL      Platelets 308 10*3/mm3      Neutrophil % 62.2 %      Lymphocyte % 28.3 %      Monocyte % 4.8 %      Eosinophil % 0.0 %      Basophil % 0.7 %      Immature Grans % 4.0 %      Neutrophils, Absolute 5.97 10*3/mm3      Lymphocytes, Absolute 2.71 10*3/mm3      Monocytes, Absolute 0.46 10*3/mm3      Eosinophils, Absolute 0.00 10*3/mm3      Basophils, Absolute 0.07 10*3/mm3      Immature Grans, Absolute 0.38 10*3/mm3      nRBC 0.2 /100 WBC     POC Glucose Once [[id number]]  (Abnormal) Collected:  06/29/19 0915    Specimen:  Blood Updated:  06/29/19 0925     Glucose 145 mg/dL      Comment: : [id number] Emmie Parekheter ID: [id number]           Hospital Course:  The patient was originally admitted on 6/29 by Dr. Encinas.  The patient has been undergoing radiation treatments for an inoperable anaplastic glioma.  He has also been on temozolomide.  He is actively being followed by Dr. Jessica, Dr. Romero, and Dr. Trevino as an outpatient. He presented with left-sided weakness.  This is felt to either be secondary to mass-effect or a Melchor's paralysis post seizure activity.  He has been admitted to the hospital several times recently for seizure activity.     Adjustments have been made to his antiepileptic drugs by Dr. Arroyo.       He continues on oral dexamethasone.     Dr. Encinas felt that his elevated transaminases are secondary to antiepileptic drug treatment.  However, Temodar can also cause severe hepatotoxicity. Oncology has seen him.  His transaminases were normal prior to June 21.  His mother tells me that his Temodar therapy has been interrupted twice while he was in the hospital.  He continues on this for now.  Oncology notes showed that they will continue to monitor his transaminases and liver function as an outpatient.     Started lactulose for elevated  "ammonia on 7/1.       PT has seen.     Palliative care has seen.  The patient and his mother wish to continue to be aggressive in his care at this point in time.    He and his mother are ready for him to transition home today.  Home health has been set up.  Durable medical equipment has been delivered.  He has follow-up planned with oncology, radiation oncology, and neurology.  He can also see primary care in 1 week.    Physical Exam on Discharge:  /94 (BP Location: Right arm, Patient Position: Lying)   Pulse 77   Temp 97.8 °F (36.6 °C) (Oral)   Resp 16   Ht 172.7 cm (68\")   Wt 84.6 kg (186 lb 9.6 oz)   SpO2 100%   BMI 28.37 kg/m²   Physical Exam  Constitutional: He is oriented to person, place, and time. He appears well-developed and well-nourished. In bed.  No distress. Less drowsy.  His mother is present with him. Discussed with his nurse, Karen.     Head: Normocephalic and atraumatic.   Eyes: Conjunctivae and EOM are normal. Pupils are equal, round, and reactive to light.   Neck: Neck supple. No JVD present.   Cardiovascular: Normal rate, regular rhythm, normal heart sounds and intact distal pulses. Exam reveals no gallop.   Pulmonary/Chest: Effort normal and breath sounds normal. No respiratory distress. He exhibits no tenderness.   Abdominal: Soft. Bowel sounds are normal. He exhibits no distension. There is no tenderness. There is no rebound and no guarding.   Musculoskeletal: Normal range of motion. He exhibits no edema, tenderness or deformity.   Neurological: He is alert and oriented to person, place, and time. He displays abnormal reflex. No cranial nerve deficit. He exhibits abnormal muscle tone. He can raise his left upper extremity against gravity today where he had been densely hemiparetic.  4/5 strength of the left lower extremity.   Skin: Skin is warm and dry. No rash noted.   Psychiatric: Flat.      Condition on Discharge: Stable.     Discharge Disposition:  Home or Self " Care    Discharge Medications:     Discharge Medications      New Medications      Instructions Start Date   lactulose 20 GM/30ML solution solution   20 g, Oral, Daily         Changes to Medications      Instructions Start Date   lacosamide 50 MG tablet tablet  Commonly known as:  VIMPAT  What changed:  how much to take   150 mg, Oral, Every 12 Hours Scheduled      pregabalin 100 MG capsule  Commonly known as:  LYRICA  What changed:    · medication strength  · how much to take  · when to take this   100 mg, Oral, Every 12 Hours Scheduled         Continue These Medications      Instructions Start Date   allopurinol 300 MG tablet  Commonly known as:  ZYLOPRIM   300 mg, Oral, Daily      ALPRAZolam 0.25 MG tablet  Commonly known as:  XANAX   0.25 mg, Oral, 2 Times Daily PRN      aspirin 81 MG tablet   81 mg, Oral, Daily      dexamethasone 4 MG tablet  Commonly known as:  DECADRON   4 mg, Oral, 3 Times Daily With Meals      divalproex 500 MG DR tablet  Commonly known as:  DEPAKOTE   1,500 mg, Oral, Every 8 Hours Scheduled      fenofibrate 145 MG tablet  Commonly known as:  TRICOR   145 mg, Oral, Daily      fish oil 1000 MG capsule capsule   2,000 mg, Oral, Daily With Breakfast      levETIRAcetam 500 MG tablet  Commonly known as:  KEPPRA   1,250 mg, Oral, 2 Times Daily      omeprazole 10 MG capsule  Commonly known as:  PRILOSEC   10 mg, Oral, Daily, For acid reflux      oxyCODONE-acetaminophen 7.5-325 MG per tablet  Commonly known as:  PERCOCET   1 tablet, Oral, Every 4 Hours PRN      promethazine 12.5 MG tablet  Commonly known as:  PHENERGAN   12.5 mg, Oral, Every 4 Hours PRN      QUEtiapine 50 MG tablet  Commonly known as:  SEROquel   50 mg, Oral, Nightly      sulfamethoxazole-trimethoprim 800-160 MG per tablet  Commonly known as:  BACTRIM DS,SEPTRA DS   1 tablet, Oral, 3 Times Weekly, Monday, Wednesday, and Friday.       TEMOZOLOMIDE PO   150 mg, Oral, Daily, 140 mg + 10 mg daily.      venlafaxine  MG 24 hr  capsule  Commonly known as:  EFFEXOR-XR   150 mg, Oral, Daily      vitamin B-6 100 MG tablet  Commonly known as:  PYRIDOXINE   100 mg, Oral, Daily         Stop These Medications    losartan 100 MG tablet  Commonly known as:  COZAAR          Discharge Diet:   Diet Instructions     Advance Diet As Tolerated          Activity at Discharge:   Activity Instructions     Activity as Tolerated          Follow-up Appointments:   1.  Primary care provider in 1 week.  2.  Radiation oncology on Friday.  3.  Dr. Romero and Dr. Doyle.  There are instructions with follow-up labs as ordered.  4.  Dr. Arroyo at his direction.    Test Results Pending at Discharge: None.     Jairon Perez DO  07/03/19  1:35 PM    Time: 35 minutes.

## 2019-07-03 NOTE — PROGRESS NOTES
Rec'd a referral about Care Choices with [facility]. Spoke with Carlo with palliative care and she said to disregard at this time. Pt is being d/c'ed.

## 2019-07-03 NOTE — THERAPY DISCHARGE NOTE
Acute Care - Physical Therapy Discharge Summary  [facility]       Patient Name: Lukasz Savage  : 1965  MRN: [id number]    Today's Date: 7/3/2019  Onset of Illness/Injury or Date of Surgery: 19    Date of Referral to PT: 19  Referring Physician: Dr. Arroyo      Admit Date: 2019      PT Recommendation and Plan    Visit Dx:    ICD-10-CM ICD-9-CM   1. Hypoglycemia E16.2 251.2   2. Altered mental status, unspecified altered mental status type R41.82 780.97   3. Brain tumor (CMS/HCC) D49.6 239.6   4. Dysphagia, unspecified type R13.10 787.20   5. Seizures (CMS/HCC) R56.9 780.39   6. Astrocytoma brain tumor (CMS/HCC) C71.9 191.9   7. Left hemiparesis (CMS/[facility]) G81.94 342.90   8. Impaired functional mobility, balance, gait, and endurance Z74.09 V49.89   9. Inoperable anaplastic glioma of brain  C71.9 191.9       Outcome Measures     Row Name 19 0800             How much help from another person do you currently need...    Turning from your back to your side while in flat bed without using bedrails?  3  -MS (r) AL (t) MS (c)      Moving from lying on back to sitting on the side of a flat bed without bedrails?  3  -MS (r) AL (t) MS (c)      Moving to and from a bed to a chair (including a wheelchair)?  3  -MS (r) AL (t) MS (c)      Standing up from a chair using your arms (e.g., wheelchair, bedside chair)?  3  -MS (r) AL (t) MS (c)      Climbing 3-5 steps with a railing?  2  -MS (r) AL (t) MS (c)      To walk in hospital room?  3  -MS (r) AL (t) MS (c)      AM-PAC 6 Clicks Score (PT)  17  -BS (r) AL (t)         Functional Assessment    Outcome Measure Options  AM-PAC 6 Clicks Basic Mobility (PT)  -MS (r) AL (t) MS (c)        User Key  (r) = Recorded By, (t) = Taken By, (c) = Cosigned By    Initials Name Provider Type    Silvina Jarrell, PT, DPT, NCS Physical Therapist    Ava Mccall, RN Registered Nurse    Brandon Silver, PT Student PT Student          PT Charges     Row Name  07/03/19 1109             Time Calculation    Start Time  1041  -NW      Stop Time  1109  -NW      Time Calculation (min)  28 min  -NW      PT Received On  07/03/19  -NW      PT Goal Re-Cert Due Date  07/12/19  -NW         Time Calculation- PT    Total Timed Code Minutes- PT  28 minute(s)  -NW         Timed Charges    16818 - Gait Training Minutes   28  -NW        User Key  (r) = Recorded By, (t) = Taken By, (c) = Cosigned By    Initials Name Provider Type    NW Dulce Maria Mendez PTA Physical Therapy Assistant          Rehab Goal Summary     Row Name 07/03/19 1534             Physical Therapy Goals    Bed Mobility Goal Selection (PT)  bed mobility, PT goal 1  -AH      Transfer Goal Selection (PT)  transfer, PT goal 1  -AH      Gait Training Goal Selection (PT)  gait training, PT goal 1  -AH      Stairs Goal Selection (PT)  stairs, PT goal 1  -AH         Bed Mobility Goal 1 (PT)    Activity/Assistive Device (Bed Mobility Goal 1, PT)  sit to supine;supine to sit  -      [place] Level/Cues Needed (Bed Mobility Goal 1, PT)  conditional independence  -      Time Frame (Bed Mobility Goal 1, PT)  long term goal (LTG);by discharge  -      Progress/Outcomes (Bed Mobility Goal 1, PT)  goal not met  -         Transfer Goal 1 (PT)    Activity/Assistive Device (Transfer Goal 1, PT)  sit-to-stand/stand-to-sit;bed-to-chair/chair-to-bed  -      [place] Level/Cues Needed (Transfer Goal 1, PT)  supervision required  -      Time Frame (Transfer Goal 1, PT)  long term goal (LTG);by discharge  -      Progress/Outcome (Transfer Goal 1, PT)  goal not met  -         Gait Training Goal 1 (PT)    Activity/Assistive Device (Gait Training Goal 1, PT)  gait (walking locomotion);assistive device use;backward stepping;decrease fall risk;forward stepping;improve balance and speed;increase endurance/gait distance;sidestepping;walker, tiffanie  -      [place] Level (Gait Training Goal 1, PT)  supervision required  -       Distance (Gait Goal 1, PT)  Pt. will ambulate 50ft on even surfaces being able to walk 20ft in 1 minute or less  -      Time Frame (Gait Training Goal 1, PT)  long term goal (LTG);by discharge  -      Progress/Outcome (Gait Training Goal 1, PT)  goal not met  -         Stairs Goal 1 (PT)    Activity/Assistive Device (Stairs Goal 1, PT)  stairs, all skills;ascending stairs;descending stairs;step-to-step;decrease fall risk;walker, tiffanie;using handrail, left;other (see comments) do if plan is to go home  -      [place] Level/Cues Needed (Stairs Goal 1, PT)  minimum assist (75% or more patient effort)  -      Number of Stairs (Stairs Goal 1, PT)  2  -      Time Frame (Stairs Goal 1, PT)  long term goal (LTG);by discharge  -      Progress/Outcome (Stairs Goal 1, PT)  goal not met  -        User Key  (r) = Recorded By, (t) = Taken By, (c) = Cosigned By    Initials Name Provider Type Discipline    Laura Alvarez PTA Physical Therapy Assistant PT              PT Discharge Summary  Reason for Discharge: Discharge from facility  Outcomes Achieved: Refer to plan of care for updates on goals achieved  Discharge Destination: Home with assist      Laura Edward PTA   7/3/2019

## 2019-07-04 ENCOUNTER — READMISSION MANAGEMENT (OUTPATIENT)
Dept: CALL CENTER | Facility: HOSPITAL | Age: 54
End: 2019-07-04

## 2019-07-04 NOTE — OUTREACH NOTE
Prep Survey      Responses   Facility patient discharged from?  [facility]   Is patient eligible?  Yes   Discharge diagnosis  Left hemiparesis, inoperable anaplastic glioma of brain, seizures, Hyperammonemia, elevated transaminase level, hypoglycemia, anemia, HTN   Does the patient have one of the following disease processes/diagnoses(primary or secondary)?  Other   Does the patient have Home health ordered?  Yes   What is the Home health agency?   [facility]   Is there a DME ordered?  Yes   What DME was ordered?  hospital bed and BSC thru [facility]   Comments regarding appointments  See AVS   Prep survey completed?  Yes          Shobha Lentz RN

## 2019-07-04 NOTE — PAYOR COMM NOTE
"Dc home 7-3-19  [id number]    Lukasz Wahl Mary (54 y.o. Male)     Date of Birth Social Security Number Address Home Phone MRN    1965  [address]  [place] [address] [phone] [id number]    Denominational Marital Status          [facility] Single       Admission Date Admission Type Admitting Provider Attending Provider Department, Room/Bed    6/29/19 Emergency Jairon Perez DO  [facility] 3A, 332/1    Discharge Date Discharge Disposition Discharge Destination        7/3/2019 Home or Self Care              Attending Provider:  (none)   Allergies:  Lipitor [Atorvastatin], Lisinopril    Isolation:  None   Infection:  C.difficile (07/07/17)   Code Status:  Prior    Ht:  172.7 cm (68\")   Wt:  84.6 kg (186 lb 9.6 oz)    Admission Cmt:  None   Principal Problem:  Left hemiparesis (CMS/HCC) [G81.94]                 Active Insurance as of 6/29/2019     Primary Coverage     Payor Plan Insurance Group Employer/Plan Group    [facility] [employer] Reviewed [facility] [employer] [facility]      Payor Plan Address Payor Plan Phone Number Payor Plan Fax Number Effective Dates    [address] [address]   7/1/2017 - None Entered    [place] [address]       Subscriber Name Subscriber Birth Date Member ID       LUKASZ WAHL MARY 1965 [id number]                 Emergency Contacts      (Rel.) Home Phone Work Phone Mobile Phone    Ivonne Wahl (Mother) [phone] -- --    Yvette Pollack (Daughter) [phone] -- [phone]               Discharge Summary      Jairon Perez DO at 7/3/2019  1:32 PM                [facility] Medicine Services  DISCHARGE SUMMARY       Date of Admission: 6/29/2019  Date of Discharge:  7/3/2019  Primary Care Physician: Linda Hoffman, DNP, APRN    Presenting Problem/History of Present Illness:  Seizure.     Final Discharge Diagnoses:  Active Hospital Problems    Diagnosis   • **Left hemiparesis (CMS/HCC)   • Inoperable " anaplastic glioma of brain    • Seizures (CMS/HCC)   • Hyperammonemia (CMS/HCC)   • Elevated transaminase level   • Hypoglycemia   • Anemia   • HTN (hypertension)     Consults:   1. Dr. Romero (covering for Claduino) with oncology.   2. Dr. Jessica with XRT.   3. Dr. Simmons and Dr. Arroyo with neurology.   4. EPI Osei with palliative care.     Procedures Performed: None.     Pertinent Test Results:   Imaging Results (last 7 days)     Procedure Component Value Units Date/Time    CT Head Without Contrast [[id number]] Collected:  06/29/19 1116     Updated:  06/29/19 1126    Narrative:       EXAMINATION: CT HEAD WO CONTRAST- 6/29/2019 11:16 AM CDT     HISTORY: Altered mental status, h/o brain tumor, anaplastic astrocytoma.     DOSE: 714 mGycm (Automatic exposure control technique was implemented in  an effort to keep the radiation dose as low as possible without  compromising image quality)     REPORT: Spiral CT of the head was performed without contrast,  reconstructed coronal and sagittal images were also reviewed.     COMPARISON: CT of the head without contrast 06/21/2019 and brain MRI  with and without contrast 06/17/2019.     There is a small craniotomy defect over the right parietal region as  before, there are 2 hyperattenuating masses in the right frontal lobe  with ill-defined margins that appear similar to the previous CT. The  largest is seen adjacent to the falx on image 19 of series 3 measures up  to 3.2 cm, essentially unchanged compared with the previous CT. There is  a small area of probable central necrosis within this larger mass. The  second more lateral lesion is difficult to measure and appears  elongated, extending superiorly into the right frontal region. This also  appears to be stable.There are no definite new lesions. No intracranial  hemorrhage is identified. The ventricles and basal cisterns appear  normal.       Impression:       1. No acute intracranial abnormality,  there are ill-defined masses in  the right frontal lobe compatible the history of brain neoplasm, similar  to the previous head CT. The largest measures about 3.2 cm and shows  mild central necrosis.  This report was finalized on 06/29/2019 11:23 by Dr. Marvin Mar MD.        Lab Results (last 7 days)     Procedure Component Value Units Date/Time    Valproic Acid Level, Total [[id number]]  (Normal) Collected:  07/03/19 0541    Specimen:  Blood Updated:  07/03/19 0631     Valproic Acid 85.1 mcg/mL     Comprehensive Metabolic Panel [[id number]]  (Abnormal) Collected:  07/03/19 0541    Specimen:  Blood Updated:  07/03/19 0627     Glucose 127 mg/dL      BUN 36 mg/dL      Creatinine 1.14 mg/dL      Sodium 141 mmol/L      Potassium 4.1 mmol/L      Chloride 110 mmol/L      CO2 24.0 mmol/L      Calcium 8.8 mg/dL      Total Protein 5.2 g/dL      Albumin 2.70 g/dL      ALT (SGPT) 132 U/L      AST (SGOT) 48 U/L      Alkaline Phosphatase 70 U/L      Total Bilirubin 0.3 mg/dL      eGFR Non African Amer 67 mL/min/1.73      Globulin 2.5 gm/dL      A/G Ratio 1.1 g/dL      BUN/Creatinine Ratio 31.6     Anion Gap 7.0 mmol/L     Narrative:       GFR Normal >60  Chronic Kidney Disease <60  Kidney Failure <15    Ammonia [[id number]]  (Normal) Collected:  07/03/19 0541    Specimen:  Blood Updated:  07/03/19 0559     Ammonia 32 umol/L     Ammonia [[id number]]  (Normal) Collected:  07/02/19 1057    Specimen:  Blood Updated:  07/02/19 1136     Ammonia 24 umol/L      Comment: Specimen hemolyzed.  Results may be affected.       Comprehensive Metabolic Panel [[id number]]  (Abnormal) Collected:  07/02/19 0454    Specimen:  Blood Updated:  07/02/19 0521     Glucose 93 mg/dL      BUN 34 mg/dL      Creatinine 1.40 mg/dL      Sodium 142 mmol/L      Potassium 4.5 mmol/L      Chloride 108 mmol/L      CO2 27.0 mmol/L      Calcium 8.8 mg/dL      Total Protein 6.3 g/dL      Albumin 3.50 g/dL      ALT (SGPT) 151 U/L      AST (SGOT) 85 U/L      Alkaline  Phosphatase 57 U/L      Total Bilirubin 0.5 mg/dL      eGFR Non African Amer 53 mL/min/1.73      Globulin 2.8 gm/dL      A/G Ratio 1.3 g/dL      BUN/Creatinine Ratio 24.3     Anion Gap 7.0 mmol/L     Narrative:       GFR Normal >60  Chronic Kidney Disease <60  Kidney Failure <15    Ammonia [[id number]]  (Abnormal) Collected:  07/02/19 0455    Specimen:  Blood Updated:  07/02/19 0519     Ammonia <9 umol/L     CBC (No Diff) [[id number]]  (Abnormal) Collected:  07/02/19 0454    Specimen:  Blood Updated:  07/02/19 0505     WBC 8.82 10*3/mm3      RBC 4.71 10*6/mm3      Hemoglobin 13.7 g/dL      Hematocrit 41.7 %      MCV 88.5 fL      MCH 29.1 pg      MCHC 32.9 g/dL      RDW 16.3 %      RDW-SD 52.4 fl      MPV 9.5 fL      Platelets 292 10*3/mm3     Ammonia [[id number]]  (Abnormal) Collected:  06/30/19 2311    Specimen:  Blood Updated:  06/30/19 2335     Ammonia 154 umol/L     CBC Auto Differential [[id number]]  (Abnormal) Collected:  06/30/19 0634    Specimen:  Blood Updated:  06/30/19 0707     WBC 9.68 10*3/mm3      RBC 4.63 10*6/mm3      Hemoglobin 13.4 g/dL      Hematocrit 39.9 %      MCV 86.2 fL      MCH 28.9 pg      MCHC 33.6 g/dL      RDW 16.2 %      RDW-SD 49.6 fl      MPV 9.7 fL      Platelets 276 10*3/mm3      Neutrophil % 58.9 %      Lymphocyte % 31.6 %      Monocyte % 5.5 %      Eosinophil % 0.1 %      Basophil % 0.6 %      Immature Grans % 3.3 %      Neutrophils, Absolute 5.70 10*3/mm3      Lymphocytes, Absolute 3.06 10*3/mm3      Monocytes, Absolute 0.53 10*3/mm3      Eosinophils, Absolute 0.01 10*3/mm3      Basophils, Absolute 0.06 10*3/mm3      Immature Grans, Absolute 0.32 10*3/mm3      nRBC 0.0 /100 WBC     Ammonia [[id number]]  (Abnormal) Collected:  06/30/19 0634    Specimen:  Blood Updated:  06/30/19 0658     Ammonia 37 umol/L     Comprehensive Metabolic Panel [[id number]]  (Abnormal) Collected:  06/30/19 0634    Specimen:  Blood Updated:  06/30/19 0656     Glucose 70 mg/dL      BUN 32 mg/dL      Creatinine  1.25 mg/dL      Sodium 141 mmol/L      Potassium 4.5 mmol/L      Chloride 111 mmol/L      CO2 21.0 mmol/L      Calcium 8.8 mg/dL      Total Protein 6.4 g/dL      Albumin 3.50 g/dL      ALT (SGPT) 157 U/L      AST (SGOT) 54 U/L      Alkaline Phosphatase 54 U/L      Total Bilirubin 0.5 mg/dL      eGFR Non African Amer 60 mL/min/1.73      Globulin 2.9 gm/dL      A/G Ratio 1.2 g/dL      BUN/Creatinine Ratio 25.6     Anion Gap 9.0 mmol/L     Narrative:       GFR Normal >60  Chronic Kidney Disease <60  Kidney Failure <15    Valproic Acid Level, Total [[id number]]  (Normal) Collected:  06/29/19 0948    Specimen:  Blood Updated:  06/29/19 1011     Valproic Acid 93.3 mcg/mL     Comprehensive Metabolic Panel [[id number]]  (Abnormal) Collected:  06/29/19 0948    Specimen:  Blood Updated:  06/29/19 1009     Glucose 131 mg/dL      BUN 33 mg/dL      Creatinine 1.37 mg/dL      Sodium 142 mmol/L      Potassium 4.1 mmol/L      Chloride 108 mmol/L      CO2 23.0 mmol/L      Calcium 8.9 mg/dL      Total Protein 6.4 g/dL      Albumin 3.50 g/dL      ALT (SGPT) 173 U/L      AST (SGOT) 68 U/L      Alkaline Phosphatase 51 U/L      Total Bilirubin 0.4 mg/dL      eGFR Non African Amer 54 mL/min/1.73      Globulin 2.9 gm/dL      A/G Ratio 1.2 g/dL      BUN/Creatinine Ratio 24.1     Anion Gap 11.0 mmol/L     Narrative:       GFR Normal >60  Chronic Kidney Disease <60  Kidney Failure <15    CBC Auto Differential [[id number]]  (Abnormal) Collected:  06/29/19 0948    Specimen:  Blood Updated:  06/29/19 0958     WBC 9.59 10*3/mm3      RBC 4.20 10*6/mm3      Hemoglobin 12.0 g/dL      Hematocrit 36.5 %      MCV 86.9 fL      MCH 28.6 pg      MCHC 32.9 g/dL      RDW 16.9 %      RDW-SD 53.3 fl      MPV 9.5 fL      Platelets 308 10*3/mm3      Neutrophil % 62.2 %      Lymphocyte % 28.3 %      Monocyte % 4.8 %      Eosinophil % 0.0 %      Basophil % 0.7 %      Immature Grans % 4.0 %      Neutrophils, Absolute 5.97 10*3/mm3      Lymphocytes, Absolute 2.71  10*3/mm3      Monocytes, Absolute 0.46 10*3/mm3      Eosinophils, Absolute 0.00 10*3/mm3      Basophils, Absolute 0.07 10*3/mm3      Immature Grans, Absolute 0.38 10*3/mm3      nRBC 0.2 /100 WBC     POC Glucose Once [[id number]]  (Abnormal) Collected:  06/29/19 0915    Specimen:  Blood Updated:  06/29/19 0925     Glucose 145 mg/dL      Comment: : [id number] Emmie Coker ID: [id number]           Hospital Course:  The patient was originally admitted on 6/29 by Dr. Encinas.  The patient has been undergoing radiation treatments for an inoperable anaplastic glioma.  He has also been on temozolomide.  He is actively being followed by Dr. Jessica, Dr. Romero, and Dr. Tervino as an outpatient. He presented with left-sided weakness.  This is felt to either be secondary to mass-effect or a Melchor's paralysis post seizure activity.  He has been admitted to the hospital several times recently for seizure activity.     Adjustments have been made to his antiepileptic drugs by Dr. Arroyo.       He continues on oral dexamethasone.     Dr. Encinas felt that his elevated transaminases are secondary to antiepileptic drug treatment.  However, Temodar can also cause severe hepatotoxicity. Oncology has seen him.  His transaminases were normal prior to June 21.  His mother tells me that his Temodar therapy has been interrupted twice while he was in the hospital.  He continues on this for now.  Oncology notes showed that they will continue to monitor his transaminases and liver function as an outpatient.     Started lactulose for elevated ammonia on 7/1.       PT has seen.     Palliative care has seen.  The patient and his mother wish to continue to be aggressive in his care at this point in time.    He and his mother are ready for him to transition home today.  Home health has been set up.  Durable medical equipment has been delivered.  He has follow-up planned with oncology, radiation oncology, and neurology.  He can also see  "primary care in 1 week.    Physical Exam on Discharge:  /94 (BP Location: Right arm, Patient Position: Lying)   Pulse 77   Temp 97.8 °F (36.6 °C) (Oral)   Resp 16   Ht 172.7 cm (68\")   Wt 84.6 kg (186 lb 9.6 oz)   SpO2 100%   BMI 28.37 kg/m²    Physical Exam  Constitutional: He is oriented to person, place, and time. He appears well-developed and well-nourished. In bed.  No distress. Less drowsy.  His mother is present with him. Discussed with his nurse,  Karen.     Head: Normocephalic and atraumatic.   Eyes: Conjunctivae and EOM are normal. Pupils are equal, round, and reactive to light.   Neck: Neck supple. No JVD present.   Cardiovascular: Normal rate, regular rhythm, normal heart sounds and intact distal pulses. Exam reveals no gallop.   Pulmonary/Chest: Effort normal and breath sounds normal. No respiratory distress. He exhibits no tenderness.   Abdominal: Soft. Bowel sounds are normal. He exhibits no distension. There is no tenderness. There is no rebound and no guarding.   Musculoskeletal: Normal range of motion. He exhibits no edema, tenderness or deformity.   Neurological: He is alert and oriented to person, place, and time. He displays abnormal reflex. No cranial nerve deficit. He exhibits abnormal muscle tone. He can raise his left upper extremity against gravity today where he had been densely hemiparetic.  4/5 strength of the left lower extremity.   Skin: Skin is warm and dry. No rash noted.   Psychiatric: Flat.      Condition on Discharge: Stable.     Discharge Disposition:  Home or Self Care    Discharge Medications:     Discharge Medications      New Medications      Instructions Start Date   lactulose 20 GM/30ML solution solution   20 g, Oral, Daily         Changes to Medications      Instructions Start Date   lacosamide 50 MG tablet tablet  Commonly known as:  VIMPAT  What changed:  how much to take   150 mg, Oral, Every 12 Hours Scheduled      pregabalin 100 MG capsule  Commonly known " as:  ARANZA  What changed:    · medication strength  · how much to take  · when to take this   100 mg, Oral, Every 12 Hours Scheduled         Continue These Medications      Instructions Start Date   allopurinol 300 MG tablet  Commonly known as:  ZYLOPRIM   300 mg, Oral, Daily      ALPRAZolam 0.25 MG tablet  Commonly known as:  XANAX   0.25 mg, Oral, 2 Times Daily PRN      aspirin 81 MG tablet   81 mg, Oral, Daily      dexamethasone 4 MG tablet  Commonly known as:  DECADRON   4 mg, Oral, 3 Times Daily With Meals      divalproex 500 MG DR tablet  Commonly known as:  DEPAKOTE   1,500 mg, Oral, Every 8 Hours Scheduled      fenofibrate 145 MG tablet  Commonly known as:  TRICOR   145 mg, Oral, Daily      fish oil 1000 MG capsule capsule   2,000 mg, Oral, Daily With Breakfast      levETIRAcetam 500 MG tablet  Commonly known as:  KEPPRA   1,250 mg, Oral, 2 Times Daily      omeprazole 10 MG capsule  Commonly known as:  PRILOSEC   10 mg, Oral, Daily, For acid reflux      oxyCODONE-acetaminophen 7.5-325 MG per tablet  Commonly known as:  PERCOCET   1 tablet, Oral, Every 4 Hours PRN      promethazine 12.5 MG tablet  Commonly known as:  PHENERGAN   12.5 mg, Oral, Every 4 Hours PRN      QUEtiapine 50 MG tablet  Commonly known as:  SEROquel   50 mg, Oral, Nightly      sulfamethoxazole-trimethoprim 800-160 MG per tablet  Commonly known as:  BACTRIM DS,SEPTRA DS   1 tablet, Oral, 3 Times Weekly, Monday, Wednesday, and Friday.       TEMOZOLOMIDE PO   150 mg, Oral, Daily, 140 mg + 10 mg daily.      venlafaxine  MG 24 hr capsule  Commonly known as:  EFFEXOR-XR   150 mg, Oral, Daily      vitamin B-6 100 MG tablet  Commonly known as:  PYRIDOXINE   100 mg, Oral, Daily         Stop These Medications    losartan 100 MG tablet  Commonly known as:  COZAAR          Discharge Diet:   Diet Instructions     Advance Diet As Tolerated          Activity at Discharge:   Activity Instructions     Activity as Tolerated          Follow-up  Appointments:   1.  Primary care provider in 1 week.  2.  Radiation oncology on Friday.  3.  Dr. Romero and Dr. Doyle.  There are instructions with follow-up labs as ordered.  4.  Dr. Arroyo at his direction.    Test Results Pending at Discharge: None.     Jairon Perez DO  07/03/19  1:35 PM    Time: 35 minutes.           Electronically signed by Jairon Perez DO at 7/3/2019  1:39 PM

## 2019-07-05 ENCOUNTER — HOSPITAL ENCOUNTER (OUTPATIENT)
Dept: RADIATION ONCOLOGY | Facility: HOSPITAL | Age: 54
Setting detail: RADIATION/ONCOLOGY SERIES
End: 2019-07-05

## 2019-07-05 ENCOUNTER — HOSPITAL ENCOUNTER (OUTPATIENT)
Dept: RADIATION ONCOLOGY | Facility: HOSPITAL | Age: 54
Discharge: HOME | End: 2019-07-05

## 2019-07-05 ENCOUNTER — NURSE TRIAGE (OUTPATIENT)
Dept: CALL CENTER | Facility: HOSPITAL | Age: 54
End: 2019-07-05

## 2019-07-05 PROCEDURE — 77412 RADIATION TX DELIVERY LVL 3: CPT | Performed by: RADIOLOGY

## 2019-07-05 NOTE — TELEPHONE ENCOUNTER
"[facility] Nurse has question son labs ordered for thi Pt. Dr. Vargas was called by Triage Nurse and she will call nurse, April back.     Reason for Disposition  • [1] Follow-up call from patient regarding patient's clinical status AND [2] information urgent    Additional Information  • Negative: Lab calling with strep throat test results and triager can call in prescription  • Negative: Lab calling with urinalysis test results and triager can call in prescription  • Negative: Medication questions  • Negative: ED call to PCP  • Negative: Physician call to PCP  • Negative: Call about patient who is currently hospitalized  • Negative: Lab or radiology calling with CRITICAL test results  • Negative: [1] Prescription not at pharmacy AND [2] was prescribed today by PCP  • Negative: [1] Caller requests to speak ONLY to PCP AND [2] URGENT question  • Negative: [1] Caller requests to speak to PCP now AND [2] won't tell us reason for call  (Exception: if 10 pm to 6 am, caller must first discuss reason for the call)  • Negative: Notification of hospital admission  • Negative: Notification of death  • Negative: Caller requesting lab results    Answer Assessment - Initial Assessment Questions  1. REASON FOR CALL or QUESTION: \"What is your reason for calling today?\" or \"How can I best  help you?\" or \"What question do you have that I can help answer?\"      Needing to clarify orders on labs with HH  2. CALLER: Document the source of call. (e.g., laboratory, patient).    no    Protocols used: PCP CALL - NO TRIAGE-ADULT-      "

## 2019-07-08 ENCOUNTER — DOCUMENTATION (OUTPATIENT)
Dept: RADIATION ONCOLOGY | Facility: HOSPITAL | Age: 54
End: 2019-07-08

## 2019-07-08 ENCOUNTER — OFFICE VISIT (OUTPATIENT)
Dept: NEUROSURGERY | Facility: CLINIC | Age: 54
End: 2019-07-08

## 2019-07-08 ENCOUNTER — READMISSION MANAGEMENT (OUTPATIENT)
Dept: CALL CENTER | Facility: HOSPITAL | Age: 54
End: 2019-07-08

## 2019-07-08 ENCOUNTER — HOSPITAL ENCOUNTER (OUTPATIENT)
Dept: RADIATION ONCOLOGY | Facility: HOSPITAL | Age: 54
Discharge: HOME | End: 2019-07-08

## 2019-07-08 DIAGNOSIS — C71.9 ANAPLASTIC GLIOMA OF BRAIN (HCC): Chronic | ICD-10-CM

## 2019-07-08 DIAGNOSIS — G81.94 LEFT HEMIPARESIS (HCC): ICD-10-CM

## 2019-07-08 DIAGNOSIS — R56.9 SEIZURES (HCC): Primary | ICD-10-CM

## 2019-07-08 PROCEDURE — 99024 POSTOP FOLLOW-UP VISIT: CPT | Performed by: NURSE PRACTITIONER

## 2019-07-08 PROCEDURE — 77412 RADIATION TX DELIVERY LVL 3: CPT | Performed by: RADIOLOGY

## 2019-07-08 NOTE — PROGRESS NOTES
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  Today is  treatment #16 of 30 planned.   The staff reports significant left-sided weakness today that was not present previously.  His mother and daughter are here with him states he left the hospital Wednesday last week, he did very well on Thursday, he was walking around and Friday morning he began to notice a cognitive decline as well as significant left-sided weakness requiring assistance. On exam, he is unable to lift his left arm, move his fingers and is without a  on the left. Right arm/hand twitching, speech is slurred but this is not new. He is on Decadron 4 mg TID. Last CT scan of head was 6/29/2019 which was stable. Appetite is fair.  They had questions regarding his Lyrica. They have an appt with Dr. Trevino this morning and I asked them to ask Dr. Trevino to give us a call with his opinion after his visit with them.

## 2019-07-08 NOTE — PROGRESS NOTES
Chief complaint:   Chief Complaint   Patient presents with   • Seizures        Subjective     HPI: This is a 54-year-old male gentleman who went to the operating room and April 2019 for a stereotactic brain biopsy which did turn out to be an anaplastic glioma.  He is here in follow-up today.  Patient has been admitted to the hospital multiple times due to seizure activity.  He is currently being followed by neurology.  He was just in the hospital last week for seizure activity.  Currently he is taking Temodar and also radiation treatments done.  He is currently taking Keppra, Depakote, and Vimpat for seizure management.  According to the family the patient has been progressively getting weaker since he left the hospital.  He does appear to be more drowsy at this time.  They are having difficulty in maintaining the dedication regimen with his seizure medications.  The patient is also weaker on his left side than the last time that I saw him and they also report that he has been having some shaking in his right hand at this time as well.  Patient is not complaining of pain.  Patient is having some difficulty controlling his bowel and bladder.    Review of Systems   Musculoskeletal: Negative.    Neurological: Positive for seizures and weakness.   Psychiatric/Behavioral: Negative.          Objective      Vital Signs  There were no vitals taken for this visit.    Physical Exam   Constitutional: He is oriented to person, place, and time. He appears well-developed and well-nourished.   HENT:   Head: Normocephalic.   Eyes: EOM are normal. Pupils are equal, round, and reactive to light.   Neck: Normal range of motion.   Pulmonary/Chest: Effort normal.   Musculoskeletal: Normal range of motion.   Neurological: He is alert and oriented to person, place, and time. He has normal reflexes. No cranial nerve deficit or sensory deficit. Gait abnormal. GCS eye subscore is 4. GCS verbal subscore is 5. GCS motor subscore is 6.    Hemiparesis on the left side in particular in the left upper extremity   Skin: Skin is warm.   Psychiatric: He has a normal mood and affect. His speech is normal and behavior is normal. Thought content normal.       Results Review: No new imaging          Assessment/Plan: At this point I did have an extensive discussion with the patient as well as his mother and daughter regarding the patient's disease and current treatment options.  I think it would be best that the patient sitter going to a skilled nursing facility for further help in treatment and management.  The family is supportive of this decision and would like to look into this.  They do feel like the patient may need to go to the emergency room if they cannot get him to a facility today because of the increased shaking and possible seizure activity that he is having as well as the weakness and difficulty in caring for him at home setting in spite of home health.  We will support the family and their decisions however there is nothing from a neurosurgical standpoint that needs to be done at this time.  Patient's needs are more neurological than neurosurgical.  We will have him follow-up with Dr. Trevino 3 months.    BMI shows the patient is Overweight. BMI chart was given to the patient. Patient is a non-smoker    Lukasz was seen today for seizures.    Diagnoses and all orders for this visit:    Seizures (CMS/HCC)    Inoperable anaplastic glioma of brain     Left hemiparesis (CMS/HCC)        I discussed the patients findings and my recommendations with patient  Tommie Thornton, EPI  07/08/19  10:59 AM

## 2019-07-08 NOTE — OUTREACH NOTE
Medical Week 1 Survey      Responses   Facility patient discharged from?  [facility]   Does the patient have one of the following disease processes/diagnoses(primary or secondary)?  Other   Is there a successful TCM telephone encounter documented?  No   Week 1 attempt successful?  Yes   Call start time  0952   Rescheduled  Rescheduled-pt requested   Call end time  0953   Discharge diagnosis  Left hemiparesis, inoperable anaplastic glioma of brain, seizures, Hyperammonemia, elevated transaminase level, hypoglycemia, anemia, HTN          Lucy Crane RN

## 2019-07-09 ENCOUNTER — HOSPITAL ENCOUNTER (OUTPATIENT)
Dept: INFUSION THERAPY | Age: 54
Discharge: HOME | End: 2019-07-09
Payer: MEDICAID

## 2019-07-09 ENCOUNTER — HOSPITAL ENCOUNTER (OUTPATIENT)
Dept: RADIATION ONCOLOGY | Facility: HOSPITAL | Age: 54
Discharge: HOME | End: 2019-07-09

## 2019-07-09 ENCOUNTER — READMISSION MANAGEMENT (OUTPATIENT)
Dept: CALL CENTER | Facility: HOSPITAL | Age: 54
End: 2019-07-09

## 2019-07-09 PROCEDURE — 85025 COMPLETE CBC W/AUTO DIFF WBC: CPT

## 2019-07-09 PROCEDURE — 77417 THER RADIOLOGY PORT IMAGE(S): CPT | Performed by: RADIOLOGY

## 2019-07-09 PROCEDURE — 80053 COMPREHEN METABOLIC PANEL: CPT

## 2019-07-09 PROCEDURE — 36415 COLL VENOUS BLD VENIPUNCTURE: CPT

## 2019-07-09 PROCEDURE — 77334 RADIATION TREATMENT AID(S): CPT | Performed by: RADIOLOGY

## 2019-07-09 PROCEDURE — 77300 RADIATION THERAPY DOSE PLAN: CPT | Performed by: RADIOLOGY

## 2019-07-09 PROCEDURE — 77412 RADIATION TX DELIVERY LVL 3: CPT | Performed by: RADIOLOGY

## 2019-07-09 NOTE — OUTREACH NOTE
Medical Week 1 Survey      Responses   Facility patient discharged from?  [facility]   Does the patient have one of the following disease processes/diagnoses(primary or secondary)?  Other   Is there a successful TCM telephone encounter documented?  No   Week 1 attempt successful?  No   Rescheduled  Revoked [No answer to rescheduled call, revoked.]          Radha Lozada RN

## 2019-07-10 ENCOUNTER — TELEPHONE (OUTPATIENT)
Dept: FAMILY MEDICINE CLINIC | Facility: CLINIC | Age: 54
End: 2019-07-10

## 2019-07-10 ENCOUNTER — DOCUMENTATION (OUTPATIENT)
Dept: ONCOLOGY | Facility: HOSPITAL | Age: 54
End: 2019-07-10

## 2019-07-10 ENCOUNTER — OFFICE VISIT (OUTPATIENT)
Dept: NEUROLOGY | Facility: CLINIC | Age: 54
End: 2019-07-10

## 2019-07-10 ENCOUNTER — HOSPITAL ENCOUNTER (OUTPATIENT)
Dept: RADIATION ONCOLOGY | Facility: HOSPITAL | Age: 54
Discharge: HOME | End: 2019-07-10

## 2019-07-10 VITALS
DIASTOLIC BLOOD PRESSURE: 88 MMHG | HEART RATE: 74 BPM | WEIGHT: 174 LBS | HEIGHT: 68 IN | SYSTOLIC BLOOD PRESSURE: 142 MMHG | BODY MASS INDEX: 26.37 KG/M2

## 2019-07-10 DIAGNOSIS — C71.9 ANAPLASTIC GLIOMA OF BRAIN (HCC): Chronic | ICD-10-CM

## 2019-07-10 DIAGNOSIS — R56.9 SEIZURES (HCC): Primary | ICD-10-CM

## 2019-07-10 DIAGNOSIS — G81.94 LEFT HEMIPARESIS (HCC): ICD-10-CM

## 2019-07-10 DIAGNOSIS — D49.6 BRAIN NEOPLASM (HCC): ICD-10-CM

## 2019-07-10 PROCEDURE — 77336 RADIATION PHYSICS CONSULT: CPT | Performed by: RADIOLOGY

## 2019-07-10 PROCEDURE — 99214 OFFICE O/P EST MOD 30 MIN: CPT | Performed by: PHYSICIAN ASSISTANT

## 2019-07-10 PROCEDURE — 77412 RADIATION TX DELIVERY LVL 3: CPT | Performed by: RADIOLOGY

## 2019-07-10 RX ORDER — MULTIVITAMIN WITH IRON
100 TABLET ORAL DAILY
Qty: 30 TABLET | Refills: 5 | Status: SHIPPED | OUTPATIENT
Start: 2019-07-10 | End: 2020-02-13

## 2019-07-10 NOTE — PROGRESS NOTES
Subjective   Lukasz Paxton Savage is a 54 y.o. male is here today for follow-up.    History of PRES with seizures, AED D/C'd 9/2017 with no further seizure.  Last ETOH 7/2017.  New symptoms including numbness on the left side of his body including the face.  Some weakness in the left side of the face.  The symptoms are associated with headache.  This headache tends to dominate on the right but will eventually involve both sides.  No tonic-clonic seizure activity or persistent neurologic deficit.  Relates that these episodes are brief, lasting 4-8 minutes and resolving completely.      Recently presented with increased difficulty with weakness in his left hand and continues to have occasional episodes of transient symptoms in the left upper extremity, face and at times left lower extremity.  Symptoms include involuntary twitching and moving of the left upper extremity, face on the left without loss of consciousness or generalized seizure-like activity.  The patient also has occasional stumbling involving the left lower extremity and will drop objects from his left hand.  He feels as though his headache has intensified somewhat.    28 March 2019 EEG did not show seizure activity.  2 April 2019 carotid vertebral ultrasound not showing any significant stenosis or issues.  2 April 2019 MRI of the brain shows substantial abnormalities.    The patient is now status post craniotomy and is healing well from this.  He follows with Dr. Trevino of neurosurgery.  Follow-up pathology continues to be pending.    The patient has had his Keppra increased to 1500 mg twice per day but continues to have some breakthrough seizure-like activity.    Interval history, patient was recently hospitalized has begun chemo therapy.  He has a progressive left hemiparesis and has developed some slurring of his speech without trouble swallowing.  He has some occasional mental status issues and has had persistent issues with breakthrough  seizures.      Neurologic Problem   The patient's primary symptoms include an altered mental status, focal sensory loss, focal weakness, a loss of balance, memory loss, slurred speech and weakness. This is a chronic problem. The current episode started more than 1 month ago. The neurological problem developed suddenly. The problem is unchanged. There was left-sided focality noted. Associated symptoms include confusion, fatigue, headaches and nausea. Pertinent negatives include no vomiting. Past treatments include sleep, bed rest, position change and acetaminophen. The treatment provided no relief. (PRES 2017)   Seizures    This is a recurrent problem. The current episode started more than 1 week ago. The problem has not changed since onset.There were more than 10 seizures. The most recent episode lasted 30 to 120 seconds. Associated symptoms include confusion, headaches and nausea. Pertinent negatives include no speech difficulty and no vomiting. Characteristics include rhythmic jerking, loss of consciousness and bit tongue. The episode was witnessed. There was no sensation of an aura present. The seizure(s) had left-sided focality.   Brain Tumor   This is a chronic problem. The current episode started more than 1 month ago. The problem occurs constantly. The problem has been gradually worsening. Associated symptoms include fatigue, headaches, nausea and weakness. Pertinent negatives include no vomiting. The symptoms are aggravated by exertion and stress. He has tried rest for the symptoms. The treatment provided no relief.        The following portions of the patient's history were reviewed and updated as appropriate: allergies, current medications, past family history, past medical history, past social history, past surgical history and problem list.    Review of Systems   Constitutional: Positive for fatigue.   HENT: Negative for trouble swallowing.    Eyes: Positive for photophobia.   Respiratory: Negative.     Cardiovascular: Negative.    Gastrointestinal: Positive for nausea. Negative for vomiting.   Endocrine: Negative.    Genitourinary: Negative.    Musculoskeletal: Positive for gait problem.   Skin: Negative.    Allergic/Immunologic: Negative.    Neurological: Positive for tremors, focal weakness, seizures, loss of consciousness, weakness, headache, loss of balance, memory problem and confusion. Negative for facial asymmetry and speech difficulty.   Hematological: Negative.    Psychiatric/Behavioral: Positive for agitation, decreased concentration, dysphoric mood, hallucinations, memory loss and sleep disturbance. The patient is nervous/anxious.        Objective   Physical Exam   Constitutional: He is oriented to person, place, and time.   HENT:   Head: Normocephalic and atraumatic.   Right Ear: Hearing and external ear normal.   Left Ear: Hearing and external ear normal.   Nose: Nose normal.   Mouth/Throat: Uvula is midline, oropharynx is clear and moist and mucous membranes are normal.   Eyes: Conjunctivae, EOM and lids are normal. Pupils are equal, round, and reactive to light.   Neck: Trachea normal and normal range of motion. No JVD present. Carotid bruit is not present.   Cardiovascular: Normal rate, regular rhythm and normal heart sounds.   No murmur heard.  Pulmonary/Chest: Effort normal and breath sounds normal.   Neurological: He is alert and oriented to person, place, and time. He displays tremor. He displays no atrophy. No cranial nerve deficit or sensory deficit. He exhibits abnormal muscle tone. Gait abnormal. GCS eye subscore is 4. GCS verbal subscore is 5. GCS motor subscore is 6.   Reflex Scores:       Bicep reflexes are 2+ on the right side and 3+ on the left side.       Brachioradialis reflexes are 2+ on the right side and 3+ on the left side.       Patellar reflexes are 2+ on the right side and 3+ on the left side.       Achilles reflexes are 2+ on the right side.  Worsening left hemiparesis  worse in the upper extremity, worse distal to proximal.   Skin: Skin is warm, dry and intact.   Psychiatric: Thought content normal. His mood appears anxious. His speech is slurred. He is slowed. Cognition and memory are normal. He expresses impulsivity. He is inattentive.   Nursing note and vitals reviewed.        Assessment/Plan   Lukasz was seen today for seizures and brain tumor.    Diagnoses and all orders for this visit:    Seizures (CMS/HCC)    Left hemiparesis (CMS/HCC)    Inoperable anaplastic glioma of brain     Today's findings, patient and family concerns, patient and family questions are reviewed in detail.  No changes are made in his antiepileptic drugs today.    Recommendations, seizure precautions, follow-up are addressed as well.      20 minutes of a 25 minute outpatient visit was spent in counseling and coordination of care today.           Dictated utilizing Dragon dictation.

## 2019-07-10 NOTE — PROGRESS NOTES
Social work consult requested due to possibly interest in nursing home placement following hospital discharge.   Patient has declined and family is unsure what to do.  Consulted with floor , rad onc nurse regarding patient needs.  Contacted patient's family by phone and spoke to both his mom and daughter.  He is currently living with his mother.   They have contacted several nursing homes none of which they really feel like meet his needs/interested in.  They would prefer to care for him in the home right now.  [facility] has started an assessment and will be present.  Discussed other options for home health care and respit services.  Discussed the [facility] respit[facility]hosp[facility] center which patient's family declined.  The daughter is currently on fmla to take care of her dad.  He continues to come for radiation and chemo.  Will meet with patient's family tomorrow to continue to explore options and help them to make a decision based on patient's current needs.

## 2019-07-10 NOTE — TELEPHONE ENCOUNTER
Nurse Zapata with [facility] home care left message reporting pt was admitted to home health. Reports pt will be seen once this week then two times weekly there after. Attempted to call nurse back. Message left.

## 2019-07-11 ENCOUNTER — OFFICE VISIT (OUTPATIENT)
Dept: FAMILY MEDICINE CLINIC | Facility: CLINIC | Age: 54
End: 2019-07-11

## 2019-07-11 ENCOUNTER — DOCUMENTATION (OUTPATIENT)
Dept: ONCOLOGY | Facility: HOSPITAL | Age: 54
End: 2019-07-11

## 2019-07-11 ENCOUNTER — HOSPITAL ENCOUNTER (OUTPATIENT)
Dept: RADIATION ONCOLOGY | Facility: HOSPITAL | Age: 54
Discharge: HOME | End: 2019-07-11

## 2019-07-11 VITALS
TEMPERATURE: 97.5 F | DIASTOLIC BLOOD PRESSURE: 92 MMHG | OXYGEN SATURATION: 98 % | SYSTOLIC BLOOD PRESSURE: 138 MMHG | RESPIRATION RATE: 18 BRPM | HEART RATE: 73 BPM

## 2019-07-11 DIAGNOSIS — R41.82 ALTERED MENTAL STATUS, UNSPECIFIED ALTERED MENTAL STATUS TYPE: ICD-10-CM

## 2019-07-11 DIAGNOSIS — I10 ESSENTIAL HYPERTENSION: Primary | ICD-10-CM

## 2019-07-11 DIAGNOSIS — C71.9 ANAPLASTIC GLIOMA OF BRAIN (HCC): Chronic | ICD-10-CM

## 2019-07-11 DIAGNOSIS — Z74.09 IMPAIRED MOBILITY AND ACTIVITIES OF DAILY LIVING: ICD-10-CM

## 2019-07-11 DIAGNOSIS — Z78.9 IMPAIRED MOBILITY AND ACTIVITIES OF DAILY LIVING: ICD-10-CM

## 2019-07-11 PROCEDURE — 99214 OFFICE O/P EST MOD 30 MIN: CPT | Performed by: FAMILY MEDICINE

## 2019-07-11 PROCEDURE — 77412 RADIATION TX DELIVERY LVL 3: CPT | Performed by: RADIOLOGY

## 2019-07-11 RX ORDER — LOSARTAN POTASSIUM 25 MG/1
25 TABLET ORAL DAILY
Qty: 90 TABLET | Refills: 0 | Status: SHIPPED | OUTPATIENT
Start: 2019-07-11 | End: 2019-07-19 | Stop reason: HOSPADM

## 2019-07-11 NOTE — PROGRESS NOTES
Transitional Care Follow Up Visit  Subjective   Cc: home health eval after recent hospitalization  Lukasz Savage is a 54 y.o. male who presents for a transitional care management visit.    Within 48 business hours after discharge our office contacted him via telephone to coordinate his care and needs.      I reviewed and discussed the details of that call along with the discharge summary, hospital problems, inpatient lab results, inpatient diagnostic studies, and consultation reports with Lukasz.     Current outpatient and discharge medications have been reconciled for the patient.    Date of TCM Phone Call 7/20/2017 6/10/2019 6/10/2019 6/26/2019 6/26/2019 7/22/2019   [facility]    Date of Admission 7/3/2017 6/3/2019 6/3/2019 6/14/2019 6/14/2019 7/15/2019   Date of Discharge 7/19/2017 6/6/2019 6/6/2019 6/20/2019 6/20/2019 7/19/2019   Discharge Disposition Home or Self Care - Home or Self Care - - Home-Health Care [facility] [place], [place]     Risk for Readmission (LACE) Score: 16 (7/19/2019  5:00 AM)      History of Present Illness   Course During Hospital Stay:    Copied from DC Note:   The patient has been undergoing radiation treatments for an inoperable anaplastic glioma.  He has also been on temozolomide.  He is actively being followed by Dr. Jessica, Dr. Romero, and Dr. Trevino as an outpatient. He presented with left-sided weakness.  This is felt to either be secondary to mass-effect or a Melchor's paralysis post seizure activity.  He has been admitted to the hospital several times recently for seizure activity.     Adjustments have been made to his antiepileptic drugs by Dr. Arroyo.       He continues on oral dexamethasone.     Dr. Encinas felt that his elevated transaminases are secondary to antiepileptic drug treatment.  However, Temodar can also cause severe hepatotoxicity. Oncology has seen him.  His transaminases were normal  prior to June 21.  His mother tells me that his Temodar therapy has been interrupted twice while he was in the hospital.  He continues on this for now.  Oncology notes showed that they will continue to monitor his transaminases and liver function as an outpatient.     Started lactulose for elevated ammonia on 7/1.       PT has seen.     Palliative care has seen.  The patient and his mother wish to continue to be aggressive in his care at this point in time.  =======================================================   Mother reports when he was in hopsital they took him off his BP medications  When PT came the other day it was elevated: 140/100, 144/90, 123/95, 142/88   Daughter reports he has always had HTN.   Mother reports pt has been eating and drinking fair, BM regular, no acute concerns.   Pt is very lethargic and sleeping the majority of the visit - this has been baseline for him intermittently.     The following portions of the patient's history were reviewed and updated as appropriate: allergies, current medications, past family history, past medical history, past social history, past surgical history and problem list.    Review of Systems   Constitutional: Positive for fatigue. Negative for appetite change and fever.   Respiratory: Negative for cough and shortness of breath.    Cardiovascular: Negative for chest pain and palpitations.   Gastrointestinal: Negative for abdominal pain, constipation, diarrhea, nausea and vomiting.   Musculoskeletal: Positive for gait problem. Negative for arthralgias.   Neurological: Positive for speech difficulty and weakness.   Psychiatric/Behavioral: Positive for behavioral problems. Negative for sleep disturbance.   All other systems reviewed and are negative.      Objective    /92 (BP Location: Left arm, Patient Position: Sitting, Cuff Size: Adult)   Pulse 73   Temp 97.5 °F (36.4 °C) (Oral)   Resp 18   SpO2 98%     Physical Exam   Constitutional: He appears  well-developed and well-nourished. He appears lethargic. He is sleeping. He has a sickly appearance. No distress.   HENT:   Head: Normocephalic and atraumatic.   Right Ear: External ear normal.   Left Ear: External ear normal.   Nose: Nose normal.   Eyes: Conjunctivae are normal. Right eye exhibits no discharge. Left eye exhibits no discharge.   Neck: No tracheal deviation present. No thyromegaly present.   Cardiovascular: Normal rate, regular rhythm and intact distal pulses.   Pulmonary/Chest: Effort normal and breath sounds normal. No stridor. No respiratory distress.   Abdominal: Soft. Bowel sounds are normal. He exhibits no distension. There is no tenderness.   Musculoskeletal: He exhibits no edema or tenderness.   Neurological: He appears lethargic. Coordination and gait abnormal.   Skin: Skin is warm and dry. He is not diaphoretic.   Psychiatric: He is slowed. Cognition and memory are impaired. He is noncommunicative.   Nursing note and vitals reviewed.      Assessment/Plan   Problems Addressed this Visit        Cardiovascular and Mediastinum    HTN (hypertension) - Primary       Nervous and Auditory    Inoperable anaplastic glioma of brain  (Chronic)       Other    Altered mental status      Other Visit Diagnoses     Impaired mobility and activities of daily living        Relevant Orders    Miscellaneous DME        PLAN:     #1 HTN: chronic, mother concerned because it has been running elevated, reviewed medication list, can restart losartan if pt continues to have uncontrolled BP readings     #2 inoperable brain tumor: chronic, pt undergoing therapy with radiation oncology, family is insistent that they want to remain aggressive with treatment, pt is unable to speak for himself today, they would like to work with home health and resume treatment, they have been seen by pallitative care but are not interested           This document has been electronically signed by Malia Vargas MD on July 25, 2019  1:29 PM

## 2019-07-11 NOTE — PROGRESS NOTES
Met with patient's daughter on this date.  Patient was not eligible for [facility] program due to address.  Gave her further resources/information on facilities closer to [facility].  Discussed the [facility] being a good resource.  She reports that he is embarassed to let her or his mother bathe/dress him and that help with that would be great.  She reports that mornings are often difficult for him and that this morning was a tad better after he was prescribed pain medicine yesterday.  She has [facility] paperwork that dr bueno's billing  will complete.  Encouraged daughter to contact this worker for further questions and needs that arise in caretaking.  Gave her resources, info on local support groups and books on caretaking.

## 2019-07-12 ENCOUNTER — HOSPITAL ENCOUNTER (OUTPATIENT)
Dept: RADIATION ONCOLOGY | Facility: HOSPITAL | Age: 54
Discharge: HOME | End: 2019-07-12

## 2019-07-12 ENCOUNTER — TELEPHONE (OUTPATIENT)
Dept: FAMILY MEDICINE CLINIC | Facility: CLINIC | Age: 54
End: 2019-07-12

## 2019-07-12 LAB
ALBUMIN SERPL-MCNC: 3.5 G/DL (ref 3.5–5.2)
ALBUMIN/GLOB SERPL: 1.5 G/DL
ALP SERPL-CCNC: 55 U/L (ref 39–117)
ALT SERPL-CCNC: 56 U/L (ref 1–41)
AST SERPL-CCNC: 26 U/L (ref 1–40)
BILIRUB SERPL-MCNC: 0.3 MG/DL (ref 0.2–1.2)
BUN SERPL-MCNC: 32 MG/DL (ref 6–20)
BUN/CREAT SERPL: 26.2 (ref 7–25)
CALCIUM SERPL-MCNC: 9.2 MG/DL (ref 8.6–10.5)
CHLORIDE SERPL-SCNC: 102 MMOL/L (ref 98–107)
CO2 SERPL-SCNC: 29 MMOL/L (ref 22–29)
CREAT SERPL-MCNC: 1.22 MG/DL (ref 0.76–1.27)
GLOBULIN SER CALC-MCNC: 2.3 GM/DL
GLUCOSE SERPL-MCNC: 90 MG/DL (ref 65–99)
POTASSIUM SERPL-SCNC: 6 MMOL/L (ref 3.5–5.2)
PROT SERPL-MCNC: 5.8 G/DL (ref 6–8.5)
SODIUM SERPL-SCNC: 144 MMOL/L (ref 136–145)

## 2019-07-12 PROCEDURE — 77412 RADIATION TX DELIVERY LVL 3: CPT | Performed by: RADIOLOGY

## 2019-07-12 NOTE — TELEPHONE ENCOUNTER
Called with [facility] Occupational Therapist called requesting home health to assist the patient with nurse aide to help with bathing I gave the verbal order to the intake the nurse that called requesting.

## 2019-07-15 ENCOUNTER — DOCUMENTATION (OUTPATIENT)
Dept: RADIATION ONCOLOGY | Facility: HOSPITAL | Age: 54
End: 2019-07-15

## 2019-07-15 ENCOUNTER — HOSPITAL ENCOUNTER (OUTPATIENT)
Dept: RADIATION ONCOLOGY | Facility: HOSPITAL | Age: 54
Discharge: HOME | End: 2019-07-15

## 2019-07-15 ENCOUNTER — TELEPHONE (OUTPATIENT)
Dept: FAMILY MEDICINE CLINIC | Facility: CLINIC | Age: 54
End: 2019-07-15

## 2019-07-15 ENCOUNTER — APPOINTMENT (OUTPATIENT)
Dept: CT IMAGING | Facility: HOSPITAL | Age: 54
End: 2019-07-15

## 2019-07-15 ENCOUNTER — HOSPITAL ENCOUNTER (INPATIENT)
Facility: HOSPITAL | Age: 54
LOS: 4 days | Discharge: HOME HEALTH | End: 2019-07-19
Attending: EMERGENCY MEDICINE | Admitting: INTERNAL MEDICINE

## 2019-07-15 DIAGNOSIS — R41.82 ALTERED MENTAL STATUS, UNSPECIFIED ALTERED MENTAL STATUS TYPE: Primary | ICD-10-CM

## 2019-07-15 DIAGNOSIS — T42.6X1A VALPROIC ACID TOXICITY, ACCIDENTAL OR UNINTENTIONAL, INITIAL ENCOUNTER: ICD-10-CM

## 2019-07-15 DIAGNOSIS — R74.01 ELEVATED TRANSAMINASE LEVEL: ICD-10-CM

## 2019-07-15 DIAGNOSIS — R13.10 DYSPHAGIA, UNSPECIFIED TYPE: ICD-10-CM

## 2019-07-15 DIAGNOSIS — Z74.09 IMPAIRED MOBILITY AND ADLS: ICD-10-CM

## 2019-07-15 DIAGNOSIS — G93.41 METABOLIC ENCEPHALOPATHY: ICD-10-CM

## 2019-07-15 DIAGNOSIS — R53.1 LEFT-SIDED WEAKNESS: ICD-10-CM

## 2019-07-15 DIAGNOSIS — Z78.9 IMPAIRED MOBILITY AND ADLS: ICD-10-CM

## 2019-07-15 DIAGNOSIS — Z74.09 IMPAIRED FUNCTIONAL MOBILITY, BALANCE, GAIT, AND ENDURANCE: ICD-10-CM

## 2019-07-15 LAB
ALBUMIN SERPL-MCNC: 3 G/DL (ref 3.5–5)
ALBUMIN/GLOB SERPL: 1 G/DL (ref 1.1–2.5)
ALP SERPL-CCNC: 62 U/L (ref 24–120)
ALT SERPL W P-5'-P-CCNC: 61 U/L (ref 0–54)
ANION GAP SERPL CALCULATED.3IONS-SCNC: 9 MMOL/L (ref 4–13)
AST SERPL-CCNC: 46 U/L (ref 7–45)
BASOPHILS # BLD AUTO: 0.02 10*3/MM3 (ref 0–0.2)
BASOPHILS NFR BLD AUTO: 0.2 % (ref 0–2)
BILIRUB SERPL-MCNC: 0.4 MG/DL (ref 0.1–1)
BUN BLD-MCNC: 37 MG/DL (ref 5–21)
BUN/CREAT SERPL: 33.6 (ref 7–25)
CALCIUM SPEC-SCNC: 8.9 MG/DL (ref 8.4–10.4)
CHLORIDE SERPL-SCNC: 102 MMOL/L (ref 98–110)
CO2 SERPL-SCNC: 29 MMOL/L (ref 24–31)
CREAT BLD-MCNC: 1.1 MG/DL (ref 0.5–1.4)
DEPRECATED RDW RBC AUTO: 55.3 FL (ref 40–54)
EOSINOPHIL # BLD AUTO: 0 10*3/MM3 (ref 0–0.7)
EOSINOPHIL NFR BLD AUTO: 0 % (ref 0–4)
ERYTHROCYTE [DISTWIDTH] IN BLOOD BY AUTOMATED COUNT: 17 % (ref 12–15)
GFR SERPL CREATININE-BSD FRML MDRD: 70 ML/MIN/1.73
GLOBULIN UR ELPH-MCNC: 2.9 GM/DL
GLUCOSE BLD-MCNC: 71 MG/DL (ref 70–100)
HCT VFR BLD AUTO: 45.1 % (ref 40–52)
HGB BLD-MCNC: 14.5 G/DL (ref 14–18)
LYMPHOCYTES # BLD AUTO: 1.21 10*3/MM3 (ref 0.72–4.86)
LYMPHOCYTES NFR BLD AUTO: 13.7 % (ref 15–45)
MAGNESIUM SERPL-MCNC: 2.2 MG/DL (ref 1.4–2.2)
MCH RBC QN AUTO: 29.2 PG (ref 28–32)
MCHC RBC AUTO-ENTMCNC: 32.2 G/DL (ref 33–36)
MCV RBC AUTO: 90.9 FL (ref 82–95)
MONOCYTES # BLD AUTO: 0.31 10*3/MM3 (ref 0.19–1.3)
MONOCYTES NFR BLD AUTO: 3.5 % (ref 4–12)
NEUTROPHILS # BLD AUTO: 7.13 10*3/MM3 (ref 1.87–8.4)
NEUTROPHILS NFR BLD AUTO: 81 % (ref 39–78)
PLATELET # BLD AUTO: 124 10*3/MM3 (ref 130–400)
PMV BLD AUTO: 11.1 FL (ref 6–12)
POTASSIUM BLD-SCNC: 4.7 MMOL/L (ref 3.5–5.3)
PROT SERPL-MCNC: 5.9 G/DL (ref 6.3–8.7)
RBC # BLD AUTO: 4.96 10*6/MM3 (ref 4.8–5.9)
SODIUM BLD-SCNC: 140 MMOL/L (ref 135–145)
VALPROATE SERPL-MCNC: 140.2 MCG/ML (ref 50–100)
VALPROATE SERPL-MCNC: 80.7 MCG/ML (ref 50–100)
VIT B12 BLD-MCNC: 845 PG/ML (ref 239–931)
WBC NRBC COR # BLD: 8.81 10*3/MM3 (ref 4.8–10.8)

## 2019-07-15 PROCEDURE — 94799 UNLISTED PULMONARY SVC/PX: CPT

## 2019-07-15 PROCEDURE — 85025 COMPLETE CBC W/AUTO DIFF WBC: CPT | Performed by: EMERGENCY MEDICINE

## 2019-07-15 PROCEDURE — 99223 1ST HOSP IP/OBS HIGH 75: CPT | Performed by: PSYCHIATRY & NEUROLOGY

## 2019-07-15 PROCEDURE — 70450 CT HEAD/BRAIN W/O DYE: CPT

## 2019-07-15 PROCEDURE — 25010000002 DEXAMETHASONE PER 1 MG: Performed by: INTERNAL MEDICINE

## 2019-07-15 PROCEDURE — 82607 VITAMIN B-12: CPT | Performed by: PSYCHIATRY & NEUROLOGY

## 2019-07-15 PROCEDURE — 25010000002 LEVETRIRACETAM PER 10 MG: Performed by: PSYCHIATRY & NEUROLOGY

## 2019-07-15 PROCEDURE — 80164 ASSAY DIPROPYLACETIC ACD TOT: CPT | Performed by: EMERGENCY MEDICINE

## 2019-07-15 PROCEDURE — 80164 ASSAY DIPROPYLACETIC ACD TOT: CPT | Performed by: PSYCHIATRY & NEUROLOGY

## 2019-07-15 PROCEDURE — 25010000002 DEXAMETHASONE PER 1 MG: Performed by: EMERGENCY MEDICINE

## 2019-07-15 PROCEDURE — 80053 COMPREHEN METABOLIC PANEL: CPT | Performed by: EMERGENCY MEDICINE

## 2019-07-15 PROCEDURE — 99284 EMERGENCY DEPT VISIT MOD MDM: CPT

## 2019-07-15 PROCEDURE — 83735 ASSAY OF MAGNESIUM: CPT | Performed by: PSYCHIATRY & NEUROLOGY

## 2019-07-15 RX ORDER — DEXAMETHASONE SODIUM PHOSPHATE 4 MG/ML
2 INJECTION, SOLUTION INTRA-ARTICULAR; INTRALESIONAL; INTRAMUSCULAR; INTRAVENOUS; SOFT TISSUE EVERY 6 HOURS
Status: DISCONTINUED | OUTPATIENT
Start: 2019-07-15 | End: 2019-07-17

## 2019-07-15 RX ORDER — LACTULOSE 20 G/30ML
20 SOLUTION ORAL DAILY PRN
Status: DISCONTINUED | OUTPATIENT
Start: 2019-07-15 | End: 2019-07-16

## 2019-07-15 RX ORDER — ONDANSETRON 4 MG/1
4 TABLET, FILM COATED ORAL EVERY 6 HOURS PRN
Status: DISCONTINUED | OUTPATIENT
Start: 2019-07-15 | End: 2019-07-19 | Stop reason: HOSPADM

## 2019-07-15 RX ORDER — VENLAFAXINE HYDROCHLORIDE 75 MG/1
150 CAPSULE, EXTENDED RELEASE ORAL DAILY
Status: DISCONTINUED | OUTPATIENT
Start: 2019-07-16 | End: 2019-07-19 | Stop reason: HOSPADM

## 2019-07-15 RX ORDER — LEVOCARNITINE 1 G/10ML
400 SOLUTION ORAL 3 TIMES DAILY
Status: DISCONTINUED | OUTPATIENT
Start: 2019-07-15 | End: 2019-07-15

## 2019-07-15 RX ORDER — SODIUM CHLORIDE 0.9 % (FLUSH) 0.9 %
3 SYRINGE (ML) INJECTION EVERY 12 HOURS SCHEDULED
Status: DISCONTINUED | OUTPATIENT
Start: 2019-07-15 | End: 2019-07-19 | Stop reason: HOSPADM

## 2019-07-15 RX ORDER — LACOSAMIDE 50 MG/1
150 TABLET ORAL EVERY 12 HOURS SCHEDULED
Status: DISCONTINUED | OUTPATIENT
Start: 2019-07-15 | End: 2019-07-19 | Stop reason: HOSPADM

## 2019-07-15 RX ORDER — LANOLIN ALCOHOL/MO/W.PET/CERES
100 CREAM (GRAM) TOPICAL DAILY
Status: DISCONTINUED | OUTPATIENT
Start: 2019-07-16 | End: 2019-07-19 | Stop reason: HOSPADM

## 2019-07-15 RX ORDER — PREGABALIN 100 MG/1
100 CAPSULE ORAL EVERY 12 HOURS SCHEDULED
Status: DISCONTINUED | OUTPATIENT
Start: 2019-07-15 | End: 2019-07-15

## 2019-07-15 RX ORDER — LEVOCARNITINE 1 G/10ML
500 SOLUTION ORAL EVERY 12 HOURS SCHEDULED
Status: DISCONTINUED | OUTPATIENT
Start: 2019-07-15 | End: 2019-07-19 | Stop reason: HOSPADM

## 2019-07-15 RX ORDER — SULFAMETHOXAZOLE AND TRIMETHOPRIM 800; 160 MG/1; MG/1
1 TABLET ORAL 3 TIMES WEEKLY
Status: DISCONTINUED | OUTPATIENT
Start: 2019-07-15 | End: 2019-07-19 | Stop reason: HOSPADM

## 2019-07-15 RX ORDER — LEVOCARNITINE 1 G/10ML
5000 SOLUTION ORAL ONCE
Status: DISCONTINUED | OUTPATIENT
Start: 2019-07-15 | End: 2019-07-15

## 2019-07-15 RX ORDER — ONDANSETRON 2 MG/ML
4 INJECTION INTRAMUSCULAR; INTRAVENOUS EVERY 6 HOURS PRN
Status: DISCONTINUED | OUTPATIENT
Start: 2019-07-15 | End: 2019-07-19 | Stop reason: HOSPADM

## 2019-07-15 RX ORDER — ACETAMINOPHEN 325 MG/1
650 TABLET ORAL EVERY 4 HOURS PRN
Status: DISCONTINUED | OUTPATIENT
Start: 2019-07-15 | End: 2019-07-19 | Stop reason: HOSPADM

## 2019-07-15 RX ORDER — PANTOPRAZOLE SODIUM 40 MG/1
40 TABLET, DELAYED RELEASE ORAL
Status: DISCONTINUED | OUTPATIENT
Start: 2019-07-16 | End: 2019-07-19 | Stop reason: HOSPADM

## 2019-07-15 RX ORDER — PREGABALIN 75 MG/1
75 CAPSULE ORAL EVERY 12 HOURS SCHEDULED
Status: DISCONTINUED | OUTPATIENT
Start: 2019-07-15 | End: 2019-07-17

## 2019-07-15 RX ORDER — LORAZEPAM 2 MG/ML
1 INJECTION INTRAMUSCULAR EVERY 4 HOURS PRN
Status: DISCONTINUED | OUTPATIENT
Start: 2019-07-15 | End: 2019-07-19 | Stop reason: HOSPADM

## 2019-07-15 RX ORDER — LACOSAMIDE 50 MG/1
150 TABLET ORAL EVERY 12 HOURS SCHEDULED
Status: DISCONTINUED | OUTPATIENT
Start: 2019-07-15 | End: 2019-07-15

## 2019-07-15 RX ORDER — DEXAMETHASONE SODIUM PHOSPHATE 10 MG/ML
10 INJECTION INTRAMUSCULAR; INTRAVENOUS ONCE
Status: COMPLETED | OUTPATIENT
Start: 2019-07-15 | End: 2019-07-15

## 2019-07-15 RX ORDER — SODIUM CHLORIDE 0.9 % (FLUSH) 0.9 %
3-10 SYRINGE (ML) INJECTION AS NEEDED
Status: DISCONTINUED | OUTPATIENT
Start: 2019-07-15 | End: 2019-07-19 | Stop reason: HOSPADM

## 2019-07-15 RX ADMIN — LEVETIRACETAM 1250 MG: 500 INJECTION, SOLUTION, CONCENTRATE INTRAVENOUS at 22:06

## 2019-07-15 RX ADMIN — DEXAMETHASONE SODIUM PHOSPHATE 10 MG: 10 INJECTION INTRAMUSCULAR; INTRAVENOUS at 11:04

## 2019-07-15 RX ADMIN — SODIUM CHLORIDE 500 ML: 9 INJECTION, SOLUTION INTRAVENOUS at 18:09

## 2019-07-15 RX ADMIN — DEXAMETHASONE SODIUM PHOSPHATE 2 MG: 4 INJECTION, SOLUTION INTRA-ARTICULAR; INTRALESIONAL; INTRAMUSCULAR; INTRAVENOUS; SOFT TISSUE at 18:09

## 2019-07-15 RX ADMIN — SODIUM CHLORIDE, PRESERVATIVE FREE 3 ML: 5 INJECTION INTRAVENOUS at 21:35

## 2019-07-15 RX ADMIN — LACTULOSE 300 ML: 10 SOLUTION ORAL at 17:50

## 2019-07-15 RX ADMIN — SODIUM CHLORIDE 150 MG: 9 INJECTION, SOLUTION INTRAVENOUS at 21:35

## 2019-07-15 NOTE — CONSULTS
Neurology Consult Note    Patient:  Lukasz Savage   YOB: 1965  MRN:  [id number]  Date of Admission:  7/15/2019  9:46 AM    Date: 7/15/2019    Referring Provider: Jairon Webb MD  Reason for Consultation: brain tumor, ? VPA Toxicity per ER      History of present illness:     This is a 54 y.o. 54 y.o. 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 His Valproate level was 140.2 today.  [facility] on Lyrica 150 mg BId and did not tolerated that as a starting dose and was very groggy and began hallucinating.  In his last hospitalization he was in the process of being weaned off but he remains on 100 mg BID.   His home meds also include multiple other sedating meds including percocet, Seroquel, and Xanax. However mother states he takes the percocet as needed and only took 1 in past week (last Thursday)  He had fallen and fractured his ribs and then fell 2 more times so only takes it when he notes significant pain.  His mother will give it to him. She also states Xanax is as needed and that he does not get it more than a couple times a week.     His last ammonia levels have always been normal while on Depakote. AT time of last admission, while he was on the current home dose of 1500 mg TID his ammonia was only 32  but hs Valproate levels then were only 85.1 which was therapeutic.      Ref. Range 6/30/2019 06:34 6/30/2019 23:11 7/2/2019 04:55 7/2/2019 10:57 7/3/2019 05:41   Ammonia : 9 - 33 umol/L 37 (H) 154 (H) <9 (L) 24 32      Ref. Range 6/21/2019 19:32 6/23/2019 02:33 6/29/2019 09:48 7/3/2019 05:41 7/15/2019 11:03   Valproic Acid 50.0 - 100.0 mcg/mL 102.0 (H) 104.4 (C) 93.3 85.1 140.2 (H)       Patient is always very difficult to  wake up in the morning. This has steadily worsened since his last discharge.  He wakes up later in the day.  This morning she could not wake him enough to get him bathed or to go to the bathroom. He had appointment in radiation therapy and between the patient's daughter and the mother they finally got him there but because of his sedation radiation was not done.     Patient has obstructive sleep apnea and something is not right with his CPAP machine.  He has not been using it.  He had an appointment with EPI Paula in sleep clinic but wound up in the hospital on the day of his appointment. Mother is not certain where his machine is but the location is over 45 minutes away.     Seizures have been under good control but on occasion they will not see a right hand twitch.  In the past the seizures have involved the left face, neck and arm.     Past Medical History:   Diagnosis Date   • Anemia 6/17/2019   • Angio-edema 7/3/2017   • Anxiety    • Arthritis    • Cancer (CMS/HCC) 05/09/2019    Brain cancer diagnoised yesterday  Dr Trevino    • Clostridium difficile colitis    • Erectile dysfunction 10/6/2016   • GERD (gastroesophageal reflux disease)    • Gout    • HCAP (healthcare-associated pneumonia) 7/11/2017   • Hyperlipidemia    • Malignant hypertension    • MSSA (methicillin susceptible Staphylococcus aureus) infection 7/31/2017   • Obstructive sleep apnea    • S/P shoulder surgery 7/27/2018   • Seizures (CMS/[facility]) 7/4/2017       Past Surgical History:   Procedure Laterality Date   • COLONOSCOPY     • CRANIOTOMY Right 4/15/2019    Procedure: CRANIOTOMY WITH BIOPSY RIGHT;  Surgeon: Dillon Trevino MD;  Location: [facility] OR;  Service: Neurosurgery   • SHOULDER ARTHROSCOPY Left    • TRACHEOSTOMY N/A 7/12/2017    Procedure: TRACHEOSTOMY WITH TRACHEOSCOPY;  Surgeon: Jairon Pierson MD;  Location: [facility] OR;  Service:        Prior to Admission medications    Medication Sig Start Date End Date Taking? Authorizing  Provider   allopurinol (ZYLOPRIM) 300 MG tablet Take 300 mg by mouth Daily.    Provider, MD Phil   ALPRAZolam (XANAX) 0.25 MG tablet Take 1 tablet by mouth 2 (Two) Times a Day As Needed for Anxiety. 5/20/19   Linda Hoffman DNP, APRN   aspirin 81 MG tablet Take 1 tablet by mouth Daily. 2/28/19   Royal Todd PA   dexamethasone (DECADRON) 4 MG tablet Take 1 tablet by mouth 3 (Three) Times a Day With Meals. 6/25/19   Oral Jessica III, MD   divalproex (DEPAKOTE) 500 MG DR tablet Take 3 tablets by mouth Every 8 (Eight) Hours. 6/20/19   Jairon Webb MD   fenofibrate (TRICOR) 145 MG tablet Take 1 tablet by mouth Daily. 5/20/19   Linda Hoffman DNP, APRN   lacosamide (VIMPAT) 50 MG tablet tablet Take 3 tablets by mouth Every 12 (Twelve) Hours for 30 days. 6/20/19 7/20/19  Jairon Webb MD   lactulose 20 GM/30ML solution solution Take 30 mL by mouth Daily.  Patient taking differently: Take 20 g by mouth As Needed. 7/3/19   Jairon Perez DO   levETIRAcetam (KEPPRA) 500 MG tablet Take 2.5 tablets by mouth 2 (Two) Times a Day. 6/20/19   Jairon Webb MD   losartan (COZAAR) 25 MG tablet Take 1 tablet by mouth Daily. 7/11/19   Malia Vargas MD   Omega-3 Fatty Acids (FISH OIL) 1000 MG capsule capsule Take 2 capsules by mouth Daily With Breakfast. 5/20/19   Linda Hoffman DNP, APRN   omeprazole (PRILOSEC) 10 MG capsule Take 1 capsule by mouth Daily. For acid reflux 5/20/19   Linda Hoffman DNP, APRN   oxyCODONE-acetaminophen (PERCOCET) 7.5-325 MG per tablet Take 1 tablet by mouth Every 4 (Four) Hours As Needed for Moderate Pain  or Severe Pain . 6/25/19   Oral Jessica III, MD   pregabalin (LYRICA) 100 MG capsule Take 1 capsule by mouth Every 12 (Twelve) Hours. 7/3/19   Jairon Perez,    promethazine (PHENERGAN) 12.5 MG tablet Take 12.5 mg by mouth Every 4 (Four) Hours As Needed for Nausea or Vomiting.    Provider, MD Phil   QUEtiapine (SEROquel)  50 MG tablet Take 1 tablet by mouth Every Night. 19   Jairon Webb MD   sulfamethoxazole-trimethoprim (BACTRIM DS,SEPTRA DS) 800-160 MG per tablet Take 1 tablet by mouth 3 (Three) Times a Week. Monday, Wednesday, and Friday.    ProviderPhil MD   TEMOZOLOMIDE PO Take 150 mg by mouth Daily. 140 mg + 10 mg daily.    ProviderPhil MD   venlafaxine XR (EFFEXOR-XR) 150 MG 24 hr capsule Take 1 capsule by mouth Daily. 19   Linda Hoffman, DNP, APRN   vitamin B-6 (PYRIDOXINE) 100 MG tablet TAKE 1 TABLET BY MOUTH DAILY. 7/10/19   Royal Todd PA       Hospital scheduled medications:     dexamethasone 2 mg Intravenous Q6H   lacosamide 150 mg Oral Q12H   lactulose enema 300 mL Rectal Once   levETIRAcetam 1,250 mg Oral BID   levOCARNitine 500 mg Oral Q12H   [START ON 2019] pantoprazole 40 mg Oral Q AM   pregabalin 100 mg Oral Q12H   sodium chloride 500 mL Intravenous Once   sodium chloride 3 mL Intravenous Q12H   sulfamethoxazole-trimethoprim 1 tablet Oral Once per day on    [START ON 2019] venlafaxine  mg Oral Daily   [START ON 2019] vitamin B-6 100 mg Oral Daily     Hospital PRN medications:  •  acetaminophen  •  lactulose  •  LORazepam  •  ondansetron **OR** ondansetron  •  sodium chloride    Allergies   Allergen Reactions   • Lipitor [Atorvastatin] Rash   • Lisinopril Angioedema     Swell tongue       Social History     Socioeconomic History   • Marital status: Single     Spouse name: Not on file   • Number of children: 2   • Years of education: Not on file   • Highest education level: Not on file   Occupational History   • Occupation: ELECTRIAL WORK   Tobacco Use   • Smoking status: Former Smoker     Packs/day: 0.33     Years: 30.00     Pack years: 9.90     Types: Cigarettes     Last attempt to quit: 7/3/2017     Years since quittin.0   • Smokeless tobacco: Never Used   Substance and Sexual Activity   • Alcohol use: No     Frequency: Never      Comment: used to drink alot but now just ocasional beer since has seizure in 2017   • Drug use: No   • Sexual activity: Defer     Family History   Problem Relation Age of Onset   • Hypertension Mother    • Diabetes Mother    • Heart disease Father    • Hypertension Father    • No Known Problems Daughter    • No Known Problems Son    • Diabetes Maternal Grandmother    • No Known Problems Maternal Grandfather    • No Known Problems Paternal Grandmother    • Heart attack Paternal Grandfather    • Kidney disease Sister        Review of Systems  A 14 point review of systems was reviewed and was negative except for hallucination, increased somnolence and right arm twitches    Vital Signs   Temp:  [97.7 °F (36.5 °C)-97.8 °F (36.6 °C)] 97.8 °F (36.6 °C)  Heart Rate:  [] 78  Resp:  [10-18] 16  BP: (133-139)/() 137/103    General Exam:  Head:  Normal cephalic, atraumatic  HEENT:  Neck supple  Fundoscopic Exam:  No signs of disc edema  CVS:  Regular rate and rhythm.  No murmurs  Carotid Examination:  No bruits  Lungs:  Clear to auscultation  Abdomen:  Non-tender, Non-distended  Extremities:  No signs of peripheral edema  Skin:  No rashes    Neurologic Exam:    Mental Status:    -Awake, Alert, Speaking and talking about his hallucinations  -Follows simple and complex commands    CN II:  Visual fields full.  Pupils equally reactive to light  CN III, IV, VI:  Extraocular Muscles full with no signs of nystagmus  CN V:  Facial sensory is symmetric   CN VII:  Facial motor symmetric  CN VIII:  Gross hearing intact bilaterally  CN IX/X:  Palate elevates symmetrically  CN XI:  Shoulder shrug symmetric  CN XII:  Tongue is midline on protrusion    Motor: (strength out of 5:  1= minimal movement, 2 = movement in plane of gravity, 3 = movement against gravity, 4 = movement against some resistance, 5 = full strength)    -Right Upper Ext: Proximal: 5 Distal: 5  -Left Upper Ext: Proximal: 5 Distal: 5    -Right Lower Ext:  Proximal: 5 Distal: 5  -Left Lower Ext: Proximal: 5 Distal: 5    DTR:  -Right   Bicep: 2+ Triceps: 2+ Brachioradialis: 2+   Patella: 2+ Ankle: 2+ Neg Babinski  -Left   Bicep: 2+ Triceps: 2+ Brachioradialis: 2+   Patella: 2+ Ankle: 2+ Neg Babinski    Sensory:  -Intact to light touch, pinprick, temperature, pain, and proprioception    Coordination:  -Finger to nose intact  -Heel to shin intact  -No ataxia    Gait  -Not tested for safety reasons.       Results Review:  Lab Results (last 7 days)     Procedure Component Value Units Date/Time    Comprehensive Metabolic Panel [[id number]]  (Abnormal) Collected:  07/15/19 1225    Specimen:  Blood Updated:  07/15/19 1326     Glucose 71 mg/dL      BUN 37 mg/dL      Creatinine 1.10 mg/dL      Sodium 140 mmol/L      Potassium 4.7 mmol/L      Chloride 102 mmol/L      CO2 29.0 mmol/L      Calcium 8.9 mg/dL      Total Protein 5.9 g/dL      Albumin 3.00 g/dL      ALT (SGPT) 61 U/L      AST (SGOT) 46 U/L      Alkaline Phosphatase 62 U/L      Total Bilirubin 0.4 mg/dL      eGFR Non African Amer 70 mL/min/1.73      Globulin 2.9 gm/dL      A/G Ratio 1.0 g/dL      BUN/Creatinine Ratio 33.6     Anion Gap 9.0 mmol/L     Narrative:       GFR Normal >60  Chronic Kidney Disease <60  Kidney Failure <15    CBC & Differential [[id number]] Collected:  07/15/19 1225    Specimen:  Blood Updated:  07/15/19 8544    Narrative:       The following orders were created for panel order CBC & Differential.  Procedure                               Abnormality         Status                     ---------                               -----------         ------                     CBC Auto Differential[[id number]]        Abnormal            Final result                 Please view results for these tests on the individual orders.    CBC Auto Differential [[id number]]  (Abnormal) Collected:  07/15/19 1225    Specimen:  Blood Updated:  07/15/19 1259     WBC 8.81 10*3/mm3      RBC 4.96 10*6/mm3      Hemoglobin 14.5  g/dL      Hematocrit 45.1 %      MCV 90.9 fL      MCH 29.2 pg      MCHC 32.2 g/dL      RDW 17.0 %      RDW-SD 55.3 fl      MPV 11.1 fL      Platelets 124 10*3/mm3      Neutrophil % 81.0 %      Lymphocyte % 13.7 %      Monocyte % 3.5 %      Eosinophil % 0.0 %      Basophil % 0.2 %      Neutrophils, Absolute 7.13 10*3/mm3      Lymphocytes, Absolute 1.21 10*3/mm3      Monocytes, Absolute 0.31 10*3/mm3      Eosinophils, Absolute 0.00 10*3/mm3      Basophils, Absolute 0.02 10*3/mm3     Valproic Acid Level, Total [[id number]]  (Abnormal) Collected:  07/15/19 1103    Specimen:  Blood Updated:  07/15/19 1203     Valproic Acid 140.2 mcg/mL           .  Imaging Results (last 24 hours)     Procedure Component Value Units Date/Time    CT Head Without Contrast [[id number]] Collected:  07/15/19 1055     Updated:  07/15/19 1102    Narrative:       EXAMINATION: CT HEAD WO CONTRAST- 7/15/2019 10:55 AM CDT     HISTORY: Fatigue     COMPARISON: CT head without contrast 06/29/2019     DOSE: 672 mGy-cm     TECHNIQUE: Sequential imaging was performed from the vertex through the  base of the skull without the use of IV contrast.  Sagittal and coronal  reformations were made from the original source data and reviewed.  Automated exposure control was also utilized to decrease patient  radiation dose.     FINDINGS:   There is redemonstration of a hyperdense lesion in the parafalcine left  frontal lobe which measures up to 3.2 cm in size, stable compared to the  previous exam. There is a right craniotomy defect with hyperdense  material noted in the brain parenchyma just deep to this, also stable  from the prior exam. There is no evidence of new intracranial  hemorrhage. There is no evidence of acute territorial infarct.  Ventricles appear normal in configuration. The basilar cisterns are  patent. Posterior fossa appears grossly normal. Paranasal sinuses and  mastoid air cells appear clear. Of          Impression:       Stable appearance of the  "brain compared to the exam from  06/29/2019, with hyperdense masses in the right frontal lobe without  associated midline shift. Right craniotomy defect is noted with a  hyperdense area just deep to it, also stable from the previous exam.  This may represent an additional mass. No acute intracranial findings  are appreciated.     This report was finalized on 07/15/2019 10:58 by Dr. Driss Ward MD.              Impression  1. Valproate Toxicity  2. Hallucinations--have occurred since on Lyrica and last hospitalization we were in process of weaning off.   3. Seizures--now well controlled on the left but will get \"twitches on the right\"  4. Astrocytomas x 2    Undergoing chemo and radiation treatments  5. Untreated sleep apnea  6. Elevated liver functions  7. Dysphagia  He did not pass bedside swallow x 2 attempts    Plan  · Agree with holding Depakote  · Daily valproate level  · Obtain more recent ammonia level. It is best to obtain fasting  · Reduce Lyrica to 75 mg BId  · Consider reduce Seroquel to 37.5 mg at night but will wait and see wait reduction in Lyrica dose first. Need to see that hallucinations stop with Lyrica reduction before reducing Seroquel  Can give Seroquel earlier to see if this reduces morning somnolence  · Obtain lab results from 7/11/2019 performed at [facility] in [place]  · NPO  · Change Vimpat and Keppra to IV for now until he passes swallow study  · Speech therapy to evaluate tomorrow  · Mother to bring patient's CPAP tomorrow so we can see what is missing       I discussed the patients findings and my recommendations with patient, family, nursing staff and discussed Mr. Savage with  Dr Tracey Parker MD  07/15/19  4:48 PM    "

## 2019-07-15 NOTE — PROGRESS NOTES
Mr. Savage is currently receiving radiation therapy for grade 3 Anaplastic Astrocytoma, 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  Today he presented to our office for his radiation treatment with altered metal changes, very lethargic with slurred speech, unable to hold head up, mother and daughter both said he slept a lot this weekend, seems to wake up more in the afternoons. They are in the processes of seeking placement without success during treatment due to his progressive left sided weakness.    He is on Decadron 4 mg TID, he has not had a pain pill since Friday, he was placed on a BP medication last week but that is the only medication changes.     I spoke to Galen Todd PA-C who saw Mr. Savage on the 10th in the office, it was his opinion as well that Mr. Savage should be admitted for further evaluation.     Dr. Jessica saw patient as well, I called and report was given to JOSHUA Laughlin in the ER. He was transported by [facility].    Thank you  Surekha Sharma PA-C

## 2019-07-15 NOTE — H&P
"    [facility] Medicine Services  HISTORY AND PHYSICAL    Date of Admission: 7/15/2019  Primary Care Physician: Linda Hoffman, DNP, APRN    Subjective     Chief Complaint: lethargy, confusion    History of Present Illness    Mr. Savage is a 53 yo M with an inoperable anaplastic glioma of the brain, recurrent seizures on multiple AEDs, and persistent left-sided weakness.  Patient presented today for radiation treatment and was found to be confused, lethargic and worsening speech.  Patient has been sleeping a lot more lately.  Daughter and mother provide most of the history.  Patient has been very lethargic in the morning and often takes several hours to get up and \"get going\".  They have not been giving him the lactulose at home as he did have some diarrhea for a while and is currently having one BM per day.  Patients left sided weakness is better than last time I cared for him but is still significantly worse than his right-side.    Long-discussion with patient and family regarding goals of care.  They want him to be full code with CPR and intubation but do not want him to have a feeding tube if it gets to that point.  They are aware of his poor prognosis and the terminal condition of his disease.    They asked about his BP having a diastolic >100.  I discussed with them avoiding any other medications that may cause weakness, sedation or potential orthostasis or falls.  Would not worry about tight BP control given the significant of his cancer.      Review of Systems   Constitutional: Positive for activity change, appetite change and fatigue. Negative for chills, diaphoresis, fever and unexpected weight change.   Respiratory: Negative for cough and shortness of breath.    Cardiovascular: Negative for chest pain and palpitations.   Gastrointestinal: Negative for abdominal distention, abdominal pain, diarrhea, nausea and vomiting.   Neurological: Positive for weakness, " light-headedness and headaches.   All other systems reviewed and are negative.       Otherwise complete ROS reviewed and negative except as mentioned in the HPI.    Past Medical History:   Past Medical History:   Diagnosis Date   • Anemia 6/17/2019   • Angio-edema 7/3/2017   • Anxiety    • Arthritis    • Cancer (CMS/HCC) 05/09/2019    Brain cancer diagnoised yesterday  Dr Trevino    • Clostridium difficile colitis    • Erectile dysfunction 10/6/2016   • GERD (gastroesophageal reflux disease)    • Gout    • HCAP (healthcare-associated pneumonia) 7/11/2017   • Hyperlipidemia    • Malignant hypertension    • MSSA (methicillin susceptible Staphylococcus aureus) infection 7/31/2017   • Obstructive sleep apnea    • S/P shoulder surgery 7/27/2018   • Seizures (CMS/HCC) 7/4/2017     Past Surgical History:  Past Surgical History:   Procedure Laterality Date   • COLONOSCOPY     • CRANIOTOMY Right 4/15/2019    Procedure: CRANIOTOMY WITH BIOPSY RIGHT;  Surgeon: Dillon Trevino MD;  Location: [facility] OR;  Service: Neurosurgery   • SHOULDER ARTHROSCOPY Left    • TRACHEOSTOMY N/A 7/12/2017    Procedure: TRACHEOSTOMY WITH TRACHEOSCOPY;  Surgeon: Jairon Pierson MD;  Location: [facility] OR;  Service:      Social History:  reports that he quit smoking about 2 years ago. His smoking use included cigarettes. He has a 9.90 pack-year smoking history. He has never used smokeless tobacco. He reports that he does not drink alcohol or use drugs.    Family History: family history includes Diabetes in his maternal grandmother and mother; Heart attack in his paternal grandfather; Heart disease in his father; Hypertension in his father and mother; Kidney disease in his sister; No Known Problems in his daughter, maternal grandfather, paternal grandmother, and son.       Allergies:  Allergies   Allergen Reactions   • Lipitor [Atorvastatin] Rash   • Lisinopril Angioedema     Swell tongue     Medications:  Prior to Admission medications    Medication  Sig Start Date End Date Taking? Authorizing Provider   allopurinol (ZYLOPRIM) 300 MG tablet Take 300 mg by mouth Daily.    Provider, MD Phil   ALPRAZolam (XANAX) 0.25 MG tablet Take 1 tablet by mouth 2 (Two) Times a Day As Needed for Anxiety. 5/20/19   Linda Hoffman DNP, APRN   aspirin 81 MG tablet Take 1 tablet by mouth Daily. 2/28/19   Royal Todd PA   dexamethasone (DECADRON) 4 MG tablet Take 1 tablet by mouth 3 (Three) Times a Day With Meals. 6/25/19   Oral Jessica III, MD   divalproex (DEPAKOTE) 500 MG DR tablet Take 3 tablets by mouth Every 8 (Eight) Hours. 6/20/19   Jairon Webb MD   fenofibrate (TRICOR) 145 MG tablet Take 1 tablet by mouth Daily. 5/20/19   Linda Hoffman DNP, APRN   lacosamide (VIMPAT) 50 MG tablet tablet Take 3 tablets by mouth Every 12 (Twelve) Hours for 30 days. 6/20/19 7/20/19  Jairon Webb MD   lactulose 20 GM/30ML solution solution Take 30 mL by mouth Daily.  Patient taking differently: Take 20 g by mouth As Needed. 7/3/19   Jairon Perez DO   levETIRAcetam (KEPPRA) 500 MG tablet Take 2.5 tablets by mouth 2 (Two) Times a Day. 6/20/19   Jairon Webb MD   losartan (COZAAR) 25 MG tablet Take 1 tablet by mouth Daily. 7/11/19   Malia Vargas MD   Omega-3 Fatty Acids (FISH OIL) 1000 MG capsule capsule Take 2 capsules by mouth Daily With Breakfast. 5/20/19   Linda Hoffman DNP, APRN   omeprazole (PRILOSEC) 10 MG capsule Take 1 capsule by mouth Daily. For acid reflux 5/20/19   Linda Hoffman DNP, APRN   oxyCODONE-acetaminophen (PERCOCET) 7.5-325 MG per tablet Take 1 tablet by mouth Every 4 (Four) Hours As Needed for Moderate Pain  or Severe Pain . 6/25/19   Oral Jessica III, MD   pregabalin (LYRICA) 100 MG capsule Take 1 capsule by mouth Every 12 (Twelve) Hours. 7/3/19   Jairon Perez,    promethazine (PHENERGAN) 12.5 MG tablet Take 12.5 mg by mouth Every 4 (Four) Hours As Needed for Nausea or Vomiting.     "ProviderPhil MD   QUEtiapine (SEROquel) 50 MG tablet Take 1 tablet by mouth Every Night. 6/20/19   Jairon Webb MD   sulfamethoxazole-trimethoprim (BACTRIM DS,SEPTRA DS) 800-160 MG per tablet Take 1 tablet by mouth 3 (Three) Times a Week. Monday, Wednesday, and Friday.    Phil Constantino MD   TEMOZOLOMIDE PO Take 150 mg by mouth Daily. 140 mg + 10 mg daily.    Phil Constantino MD   venlafaxine XR (EFFEXOR-XR) 150 MG 24 hr capsule Take 1 capsule by mouth Daily. 5/20/19   Linda Hoffman, DNP, APRN   vitamin B-6 (PYRIDOXINE) 100 MG tablet TAKE 1 TABLET BY MOUTH DAILY. 7/10/19   Royal Todd PA     Objective     Vital Signs: /91   Pulse 79   Temp 97.7 °F (36.5 °C) (Oral)   Resp 12   Ht 172.7 cm (68\")   Wt 80.7 kg (178 lb)   SpO2 98%   BMI 27.06 kg/m²   Physical Exam   Constitutional: No distress.   HENT:   Head: Normocephalic and atraumatic.   Eyes: Conjunctivae are normal. No scleral icterus.   Neck: Neck supple. No JVD present.   Cardiovascular: Normal rate and regular rhythm. Exam reveals no gallop and no friction rub.   No murmur heard.  Pulmonary/Chest: Effort normal and breath sounds normal. No stridor. No respiratory distress. He has no wheezes.   Abdominal: Soft. Bowel sounds are normal. He exhibits no distension. There is no tenderness. There is no guarding.   Musculoskeletal: He exhibits no edema.   Neurological:   Awakens to voice and tactile stimuli but goes right back to sleep; weakness on left upper and lower extremity; normal strength on the right side   Skin: Skin is warm and dry. He is not diaphoretic. No erythema.   Psychiatric: He has a normal mood and affect. His behavior is normal.   Lethargic   Nursing note and vitals reviewed.          Results Reviewed:  Lab Results (last 24 hours)     Procedure Component Value Units Date/Time    Comprehensive Metabolic Panel [[id number]]  (Abnormal) Collected:  07/15/19 1225    Specimen:  Blood Updated:  " 07/15/19 1326     Glucose 71 mg/dL      BUN 37 mg/dL      Creatinine 1.10 mg/dL      Sodium 140 mmol/L      Potassium 4.7 mmol/L      Chloride 102 mmol/L      CO2 29.0 mmol/L      Calcium 8.9 mg/dL      Total Protein 5.9 g/dL      Albumin 3.00 g/dL      ALT (SGPT) 61 U/L      AST (SGOT) 46 U/L      Alkaline Phosphatase 62 U/L      Total Bilirubin 0.4 mg/dL      eGFR Non African Amer 70 mL/min/1.73      Globulin 2.9 gm/dL      A/G Ratio 1.0 g/dL      BUN/Creatinine Ratio 33.6     Anion Gap 9.0 mmol/L     Narrative:       GFR Normal >60  Chronic Kidney Disease <60  Kidney Failure <15    CBC & Differential [[id number]] Collected:  07/15/19 1225    Specimen:  Blood Updated:  07/15/19 1254    Narrative:       The following orders were created for panel order CBC & Differential.  Procedure                               Abnormality         Status                     ---------                               -----------         ------                     CBC Auto Differential[[id number]]        Abnormal            Final result                 Please view results for these tests on the individual orders.    CBC Auto Differential [[id number]]  (Abnormal) Collected:  07/15/19 1225    Specimen:  Blood Updated:  07/15/19 1254     WBC 8.81 10*3/mm3      RBC 4.96 10*6/mm3      Hemoglobin 14.5 g/dL      Hematocrit 45.1 %      MCV 90.9 fL      MCH 29.2 pg      MCHC 32.2 g/dL      RDW 17.0 %      RDW-SD 55.3 fl      MPV 11.1 fL      Platelets 124 10*3/mm3      Neutrophil % 81.0 %      Lymphocyte % 13.7 %      Monocyte % 3.5 %      Eosinophil % 0.0 %      Basophil % 0.2 %      Neutrophils, Absolute 7.13 10*3/mm3      Lymphocytes, Absolute 1.21 10*3/mm3      Monocytes, Absolute 0.31 10*3/mm3      Eosinophils, Absolute 0.00 10*3/mm3      Basophils, Absolute 0.02 10*3/mm3     Valproic Acid Level, Total [[id number]]  (Abnormal) Collected:  07/15/19 1103    Specimen:  Blood Updated:  07/15/19 1203     Valproic Acid 140.2 mcg/mL         Imaging  Results (last 24 hours)     Procedure Component Value Units Date/Time    CT Head Without Contrast [[id number]] Collected:  07/15/19 1055     Updated:  07/15/19 1102    Narrative:       EXAMINATION: CT HEAD WO CONTRAST- 7/15/2019 10:55 AM CDT     HISTORY: Fatigue     COMPARISON: CT head without contrast 06/29/2019     DOSE: 672 mGy-cm     TECHNIQUE: Sequential imaging was performed from the vertex through the  base of the skull without the use of IV contrast.  Sagittal and coronal  reformations were made from the original source data and reviewed.  Automated exposure control was also utilized to decrease patient  radiation dose.     FINDINGS:   There is redemonstration of a hyperdense lesion in the parafalcine left  frontal lobe which measures up to 3.2 cm in size, stable compared to the  previous exam. There is a right craniotomy defect with hyperdense  material noted in the brain parenchyma just deep to this, also stable  from the prior exam. There is no evidence of new intracranial  hemorrhage. There is no evidence of acute territorial infarct.  Ventricles appear normal in configuration. The basilar cisterns are  patent. Posterior fossa appears grossly normal. Paranasal sinuses and  mastoid air cells appear clear. Of          Impression:       Stable appearance of the brain compared to the exam from  06/29/2019, with hyperdense masses in the right frontal lobe without  associated midline shift. Right craniotomy defect is noted with a  hyperdense area just deep to it, also stable from the previous exam.  This may represent an additional mass. No acute intracranial findings  are appreciated.     This report was finalized on 07/15/2019 10:58 by Dr. Driss Ward MD.        I have personally reviewed and interpreted the radiology studies and ECG obtained at time of admission.     Assessment / Plan     Assessment:   Active Hospital Problems    Diagnosis   • **Metabolic encephalopathy   • Valproic acid toxicity,  possible    • Left-sided weakness   • Hyperammonemia (CMS/HCC)   • Inoperable anaplastic glioma of brain    • Elevated transaminase level   • Anemia   • Seizures (CMS/HCC)         Plan:   1.  Admit to medicine  2.  Initially was going to give IV loading of carnitine, however, as mental status improved to what appears to me to be close to his new baseline, will hold off on aggressive carnitine treatment.  Will however give the patient 500 mg BID of carnitine as he likely is deficient  3.  Neurology consultation  4.  Oncology consultation  5.  Social work for assistance with placement  6.  Dexamethasone 2 mg q6h  7.  AEDs per neurology, resumed all except depakote for now  8.  Resume lactulose  9.  IVF   10.  SLP    Long goals of care discussion had with family.        Code Status: Full code    Daughter and mother to make decisions if patient unable     I discussed the patient's findings and my recommendations with the Dr. Simmons and family    Estimated length of stay ?    Jairon Webb MD   07/15/19   2:34 PM

## 2019-07-15 NOTE — ED PROVIDER NOTES
Subjective   Patient with altered mental status recently diagnosed astrocytoma he is more sleepy in the morning less sleepy at night may be pharmacologically mediated today is worse the family is aware about his poor prognosis        Altered Mental Status   Presenting symptoms: confusion, lethargy and partial responsiveness    Severity:  Severe  Most recent episode:  Today  Episode history:  Single  Timing:  Intermittent  Progression:  Waxing and waning  Chronicity:  Chronic  Context: recent change in medication and recent illness    Context: not alcohol use, not dementia, not drug use, not head injury, not homeless and not nursing home resident    Associated symptoms: no abdominal pain, normal movement, no bladder incontinence, no decreased appetite, no difficulty breathing, no fever, no hallucinations, no headaches, no light-headedness, no seizures, no slurred speech, no visual change, no vomiting and no weakness        Review of Systems   Unable to perform ROS: Mental status change   Constitutional: Negative for decreased appetite and fever.   Gastrointestinal: Negative for abdominal pain and vomiting.   Genitourinary: Negative for bladder incontinence.   Neurological: Negative for seizures, weakness, light-headedness and headaches.   Psychiatric/Behavioral: Positive for confusion. Negative for hallucinations.       Past Medical History:   Diagnosis Date   • Anemia 6/17/2019   • Angio-edema 7/3/2017   • Anxiety    • Arthritis    • Cancer (CMS/[facility]) 05/09/2019    Brain cancer diagnoised yesterday  Dr Trevino    • Clostridium difficile colitis    • Erectile dysfunction 10/6/2016   • GERD (gastroesophageal reflux disease)    • Gout    • HCAP (healthcare-associated pneumonia) 7/11/2017   • Hyperlipidemia    • Malignant hypertension    • MSSA (methicillin susceptible Staphylococcus aureus) infection 7/31/2017   • Obstructive sleep apnea    • S/P shoulder surgery 7/27/2018   • Seizures (CMS/[facility]) 7/4/2017       Allergies    Allergen Reactions   • Lipitor [Atorvastatin] Rash   • Lisinopril Angioedema     Swell tongue       Past Surgical History:   Procedure Laterality Date   • COLONOSCOPY     • CRANIOTOMY Right 4/15/2019    Procedure: CRANIOTOMY WITH BIOPSY RIGHT;  Surgeon: Dillon Trevino MD;  Location: [facility] OR;  Service: Neurosurgery   • SHOULDER ARTHROSCOPY Left    • TRACHEOSTOMY N/A 2017    Procedure: TRACHEOSTOMY WITH TRACHEOSCOPY;  Surgeon: Jairon Pierson MD;  Location:  [facility] OR;  Service:        Family History   Problem Relation Age of Onset   • Hypertension Mother    • Diabetes Mother    • Heart disease Father    • Hypertension Father    • No Known Problems Daughter    • No Known Problems Son    • Diabetes Maternal Grandmother    • No Known Problems Maternal Grandfather    • No Known Problems Paternal Grandmother    • Heart attack Paternal Grandfather    • Kidney disease Sister        Social History     Socioeconomic History   • Marital status: Single     Spouse name: Not on file   • Number of children: 2   • Years of education: Not on file   • Highest education level: Not on file   Occupational History   • Occupation: ELECTRIAL WORK   Tobacco Use   • Smoking status: Former Smoker     Packs/day: 0.33     Years: 30.00     Pack years: 9.90     Types: Cigarettes     Last attempt to quit: 7/3/2017     Years since quittin.0   • Smokeless tobacco: Never Used   Substance and Sexual Activity   • Alcohol use: No     Frequency: Never     Comment: used to drink alot but now just ocasional beer since has seizure in 2017   • Drug use: No   • Sexual activity: Defer           Objective   Physical Exam   Constitutional: He appears well-developed and well-nourished. He appears lethargic. He is sleeping. He has a sickly appearance. He appears distressed.   HENT:   Head: Normocephalic.   Nose: Nose normal.   Mouth/Throat: Oropharynx is clear and moist.   Eyes: EOM are normal. Pupils are equal, round, and reactive to light. Right  eye exhibits no discharge. Left eye exhibits no discharge. No scleral icterus.   Neck: Normal range of motion. Neck supple. No JVD present. No neck rigidity. No tracheal deviation present. No Brudzinski's sign and no Kernig's sign noted. No thyromegaly present.   Cardiovascular: Normal rate, regular rhythm, normal heart sounds and intact distal pulses. Exam reveals no friction rub.   No murmur heard.  Pulmonary/Chest: Effort normal and breath sounds normal. No stridor. No respiratory distress. He has no wheezes. He has no rales. He exhibits no tenderness.   Abdominal: Soft. Bowel sounds are normal. He exhibits no distension and no mass.   Musculoskeletal: Normal range of motion. He exhibits no edema or tenderness.   Neurological: He has normal reflexes. He appears lethargic. He is disoriented. He displays normal reflexes. No cranial nerve deficit or sensory deficit. He exhibits normal muscle tone. He displays no seizure activity. Coordination normal.   Skin: Skin is warm and dry. No rash noted. He is not diaphoretic. No erythema.   Psychiatric: He has a normal mood and affect.   Nursing note and vitals reviewed.      Procedures           ED Course  ED Course as of Jul 15 1821   Mon Jul 15, 2019   1339 Case was discussed at length with the family they know about the poor prognosis of the patient and the patient appears to be depokate toxic we will admit him to the hospital  [TS]      ED Course User Index  [TS] Ned Wilson MD                  [facility]      Final diagnoses:   Altered mental status, unspecified altered mental status type   Valproic acid toxicity, accidental or unintentional, initial encounter            Ned Wilson MD  07/15/19 1821

## 2019-07-16 ENCOUNTER — DOCUMENTATION (OUTPATIENT)
Dept: ONCOLOGY | Facility: HOSPITAL | Age: 54
End: 2019-07-16

## 2019-07-16 LAB
ALBUMIN SERPL-MCNC: 2.9 G/DL (ref 3.5–5)
ALBUMIN/GLOB SERPL: 1.1 G/DL (ref 1.1–2.5)
ALP SERPL-CCNC: 53 U/L (ref 24–120)
ALT SERPL W P-5'-P-CCNC: 61 U/L (ref 0–54)
AMMONIA BLD-SCNC: <9 UMOL/L (ref 9–33)
AMPHET+METHAMPHET UR QL: NEGATIVE
ANION GAP SERPL CALCULATED.3IONS-SCNC: 6 MMOL/L (ref 4–13)
AST SERPL-CCNC: 34 U/L (ref 7–45)
BARBITURATES UR QL SCN: NEGATIVE
BENZODIAZ UR QL SCN: NEGATIVE
BILIRUB SERPL-MCNC: 0.7 MG/DL (ref 0.1–1)
BUN BLD-MCNC: 40 MG/DL (ref 5–21)
BUN/CREAT SERPL: 35.1 (ref 7–25)
CALCIUM SPEC-SCNC: 9.2 MG/DL (ref 8.4–10.4)
CANNABINOIDS SERPL QL: NEGATIVE
CHLORIDE SERPL-SCNC: 104 MMOL/L (ref 98–110)
CO2 SERPL-SCNC: 31 MMOL/L (ref 24–31)
COCAINE UR QL: NEGATIVE
CREAT BLD-MCNC: 1.14 MG/DL (ref 0.5–1.4)
DEPRECATED RDW RBC AUTO: 53.4 FL (ref 40–54)
ERYTHROCYTE [DISTWIDTH] IN BLOOD BY AUTOMATED COUNT: 16.3 % (ref 12–15)
GFR SERPL CREATININE-BSD FRML MDRD: 67 ML/MIN/1.73
GLOBULIN UR ELPH-MCNC: 2.7 GM/DL
GLUCOSE BLD-MCNC: 74 MG/DL (ref 70–100)
HCT VFR BLD AUTO: 42.2 % (ref 40–52)
HGB BLD-MCNC: 13.7 G/DL (ref 14–18)
MCH RBC QN AUTO: 29 PG (ref 28–32)
MCHC RBC AUTO-ENTMCNC: 32.5 G/DL (ref 33–36)
MCV RBC AUTO: 89.4 FL (ref 82–95)
METHADONE UR QL SCN: NEGATIVE
OPIATES UR QL: NEGATIVE
PCP UR QL SCN: NEGATIVE
PLATELET # BLD AUTO: 112 10*3/MM3 (ref 130–400)
PMV BLD AUTO: 11.8 FL (ref 6–12)
POTASSIUM BLD-SCNC: 4.9 MMOL/L (ref 3.5–5.3)
PROT SERPL-MCNC: 5.6 G/DL (ref 6.3–8.7)
RBC # BLD AUTO: 4.72 10*6/MM3 (ref 4.8–5.9)
SODIUM BLD-SCNC: 141 MMOL/L (ref 135–145)
VALPROATE SERPL-MCNC: 24.1 MCG/ML (ref 50–100)
WBC NRBC COR # BLD: 9.92 10*3/MM3 (ref 4.8–10.8)

## 2019-07-16 PROCEDURE — 92610 EVALUATE SWALLOWING FUNCTION: CPT | Performed by: SPEECH-LANGUAGE PATHOLOGIST

## 2019-07-16 PROCEDURE — 94660 CPAP INITIATION&MGMT: CPT

## 2019-07-16 PROCEDURE — 25010000002 DEXAMETHASONE PER 1 MG: Performed by: INTERNAL MEDICINE

## 2019-07-16 PROCEDURE — 94799 UNLISTED PULMONARY SVC/PX: CPT

## 2019-07-16 PROCEDURE — 80307 DRUG TEST PRSMV CHEM ANLYZR: CPT | Performed by: PSYCHIATRY & NEUROLOGY

## 2019-07-16 PROCEDURE — 99233 SBSQ HOSP IP/OBS HIGH 50: CPT | Performed by: PSYCHIATRY & NEUROLOGY

## 2019-07-16 PROCEDURE — 82140 ASSAY OF AMMONIA: CPT | Performed by: PSYCHIATRY & NEUROLOGY

## 2019-07-16 PROCEDURE — 77412 RADIATION TX DELIVERY LVL 3: CPT | Performed by: RADIOLOGY

## 2019-07-16 PROCEDURE — 80164 ASSAY DIPROPYLACETIC ACD TOT: CPT | Performed by: PSYCHIATRY & NEUROLOGY

## 2019-07-16 PROCEDURE — 97166 OT EVAL MOD COMPLEX 45 MIN: CPT

## 2019-07-16 PROCEDURE — 80053 COMPREHEN METABOLIC PANEL: CPT | Performed by: INTERNAL MEDICINE

## 2019-07-16 PROCEDURE — 94760 N-INVAS EAR/PLS OXIMETRY 1: CPT

## 2019-07-16 PROCEDURE — 85027 COMPLETE CBC AUTOMATED: CPT | Performed by: INTERNAL MEDICINE

## 2019-07-16 PROCEDURE — 97162 PT EVAL MOD COMPLEX 30 MIN: CPT

## 2019-07-16 RX ORDER — LACTULOSE 20 G/30ML
20 SOLUTION ORAL DAILY
Status: DISCONTINUED | OUTPATIENT
Start: 2019-07-16 | End: 2019-07-19 | Stop reason: HOSPADM

## 2019-07-16 RX ADMIN — DEXAMETHASONE SODIUM PHOSPHATE 2 MG: 4 INJECTION, SOLUTION INTRA-ARTICULAR; INTRALESIONAL; INTRAMUSCULAR; INTRAVENOUS; SOFT TISSUE at 23:41

## 2019-07-16 RX ADMIN — LEVETIRACETAM 1250 MG: 500 TABLET, FILM COATED ORAL at 09:27

## 2019-07-16 RX ADMIN — DIVALPROEX SODIUM 1250 MG: 500 TABLET, DELAYED RELEASE ORAL at 20:58

## 2019-07-16 RX ADMIN — DEXAMETHASONE SODIUM PHOSPHATE 2 MG: 4 INJECTION, SOLUTION INTRA-ARTICULAR; INTRALESIONAL; INTRAMUSCULAR; INTRAVENOUS; SOFT TISSUE at 12:02

## 2019-07-16 RX ADMIN — PREGABALIN 75 MG: 75 CAPSULE ORAL at 20:58

## 2019-07-16 RX ADMIN — Medication 100 MG: at 09:28

## 2019-07-16 RX ADMIN — LACTULOSE 20 G: 20 SOLUTION ORAL at 16:22

## 2019-07-16 RX ADMIN — LACOSAMIDE 150 MG: 50 TABLET, FILM COATED ORAL at 20:58

## 2019-07-16 RX ADMIN — DIVALPROEX SODIUM 1250 MG: 500 TABLET, DELAYED RELEASE ORAL at 13:40

## 2019-07-16 RX ADMIN — LEVOCARNITINE 500 MG: 1 SOLUTION ORAL at 20:59

## 2019-07-16 RX ADMIN — DEXAMETHASONE SODIUM PHOSPHATE 2 MG: 4 INJECTION, SOLUTION INTRA-ARTICULAR; INTRALESIONAL; INTRAMUSCULAR; INTRAVENOUS; SOFT TISSUE at 05:56

## 2019-07-16 RX ADMIN — DEXAMETHASONE SODIUM PHOSPHATE 2 MG: 4 INJECTION, SOLUTION INTRA-ARTICULAR; INTRALESIONAL; INTRAMUSCULAR; INTRAVENOUS; SOFT TISSUE at 17:14

## 2019-07-16 RX ADMIN — LEVETIRACETAM 1250 MG: 500 TABLET, FILM COATED ORAL at 20:58

## 2019-07-16 RX ADMIN — DEXAMETHASONE SODIUM PHOSPHATE 2 MG: 4 INJECTION, SOLUTION INTRA-ARTICULAR; INTRALESIONAL; INTRAMUSCULAR; INTRAVENOUS; SOFT TISSUE at 00:19

## 2019-07-16 RX ADMIN — LACOSAMIDE 150 MG: 50 TABLET, FILM COATED ORAL at 09:37

## 2019-07-16 RX ADMIN — SODIUM CHLORIDE, PRESERVATIVE FREE 3 ML: 5 INJECTION INTRAVENOUS at 20:58

## 2019-07-16 RX ADMIN — SODIUM CHLORIDE, PRESERVATIVE FREE 3 ML: 5 INJECTION INTRAVENOUS at 09:30

## 2019-07-16 RX ADMIN — VENLAFAXINE HYDROCHLORIDE 150 MG: 75 CAPSULE, EXTENDED RELEASE ORAL at 09:28

## 2019-07-16 RX ADMIN — LEVOCARNITINE 500 MG: 1 SOLUTION ORAL at 09:30

## 2019-07-16 RX ADMIN — PREGABALIN 75 MG: 75 CAPSULE ORAL at 09:28

## 2019-07-16 NOTE — PROGRESS NOTES
"  Neurology Progress Note      Date of admission: 7/15/2019  9:46 AM  Date of visit: 7/16/2019    Chief Complaint:  F/u valproate toxicity and somnolence    Subjective     Subjective:  Continues to be very sleepy.  Difficult to arouse but was able to eat this morning.  Not able to do PT due to somnolence.  CPAP ordered last night but Respiratory therapist, Lidia Hernandez, refused to place it. Family has no idea where his CPAP is at his home and it is around/over 10 years old and is missing \"parts\" so likely if they find it, it will not be useable.   .   Remains with elevated liver functions but is table from yesterday and improved from earlier in July  Results for EMILIANA WAHL MARY (MRN [id number]) as of 7/16/2019 12:07   Ref. Range 7/2/2019 04:54 7/3/2019 05:41 7/11/2019 09:32 7/15/2019 12:25 7/16/2019 05:10   ALT (SGPT)  0 - 54 U/L 151 (H) 132 (H) 56 (H) 61 (H) 61 (H)   Depakote may caus this and yet his ammonia level is unremarkable so I suspect this is not from [facility].   Medications:  Current Facility-Administered Medications   Medication Dose Route Frequency Provider Last Rate Last Dose   • acetaminophen (TYLENOL) tablet 650 mg  650 mg Oral Q4H PRN Jairon Webb MD       • dexamethasone (DECADRON) injection 2 mg  2 mg Intravenous Q6H Jairon Webb MD   2 mg at 07/16/19 0556   • lacosamide (VIMPAT) 150 mg in sodium chloride 0.9 % 50 mL IVPB  150 mg Intravenous Q12H Doreen Zuniga  mL/hr at 07/15/19 2135 150 mg at 07/15/19 2135    Or   • lacosamide (VIMPAT) tablet 150 mg  150 mg Oral Q12H Doreen Zuniga MD   150 mg at 07/16/19 0937   • lactulose solution 20 g  20 g Oral Daily PRN Jairon Webb MD       • levETIRAcetam (KEPPRA) 1,250 mg in sodium chloride 0.9 % 100 mL IVPB  1,250 mg Intravenous Q12H Doreen Zuniga MD   1,250 mg at 07/15/19 2206    Or   • levETIRAcetam (KEPPRA) tablet 1,250 mg  1,250 mg Oral Q12H Doreen Zuniga MD   1,250 mg at " 07/16/19 0927   • levOCARNitine (CARNITOR) 1 GM/10ML solution 500 mg  500 mg Oral Q12H Jairon Webb MD   500 mg at 07/16/19 0930   • LORazepam (ATIVAN) injection 1 mg  1 mg Intravenous Q4H PRN Jairon Webb MD       • ondansetron (ZOFRAN) tablet 4 mg  4 mg Oral Q6H PRN Jairon Webb MD        Or   • ondansetron (ZOFRAN) injection 4 mg  4 mg Intravenous Q6H PRN Jairon Webb MD       • pantoprazole (PROTONIX) EC tablet 40 mg  40 mg Oral Q AM Jairon Webb MD       • pregabalin (LYRICA) capsule 75 mg  75 mg Oral Q12H Doreen Zuniga MD   75 mg at 07/16/19 0928   • sodium chloride 0.9 % flush 3 mL  3 mL Intravenous Q12H Jairon Webb MD   3 mL at 07/16/19 0930   • sodium chloride 0.9 % flush 3-10 mL  3-10 mL Intravenous PRN Jairon Webb MD       • sulfamethoxazole-trimethoprim (BACTRIM DS,SEPTRA DS) 800-160 MG per tablet 160 mg  1 tablet Oral Once per day on Mon Wed Fri Jairon Webb MD       • temozolomide (TEMODAR) 150 mg  150 mg Oral Daily Luis Miguel Doyle MD       • venlafaxine XR (EFFEXOR-XR) 24 hr capsule 150 mg  150 mg Oral Daily Jairon Webb MD   150 mg at 07/16/19 0928   • vitamin B-6 (PYRIDOXINE) tablet 100 mg  100 mg Oral Daily Jairon Webb MD   100 mg at 07/16/19 0928       Review of Systems:   -A 14 point review of systems is completed and is negative except for increased somnolence and worsening left sided weakness.   Objective     Objective      Vital Signs  Temp:  [97 °F (36.1 °C)-98.3 °F (36.8 °C)] 98 °F (36.7 °C)  Heart Rate:  [58-87] 66  Resp:  [16-18] 16  BP: (119-144)/() 133/95    Physical Exam:    HEENT:  Neck supple  CVS:  Regular rate and rhythm.  No murmurs  Carotid Examination:  No bruits  Lungs:  Clear to auscultation  Abdomen:  Non-tender, Non-distended  Extremities:  No signs of peripheral edema    Neurologic Exam:    -Somnolent and too sleepy to follow commands.   -    Cranial nerves II through XII No overt  abnormalities  Eyes open and move conjugately No overt facial motor weakness. Hearing in  Motor: (strength out of 5:  1= minimal movement, 2 = movement in plane of gravity, 3 = movement against gravity, 4 = movement against some resistance, 5 = full strength)    He moves all 4 extremities spontaneously but moves left side less.     DTR:  2+ throughout in all four extremities    Sensory:  -Intact to light touch, pinprick, temperature, pain, and proprioception    Coordination/Gait:  -No ataxia     Results Review:    I reviewed the patient's new clinical results.    Lab Results (last 24 hours)     Procedure Component Value Units Date/Time    Urine Drug Screen - Urine, Clean Catch [[id number]]  (Normal) Collected:  07/16/19 1015    Specimen:  Urine, Clean Catch Updated:  07/16/19 1056     Amphetamine Screen, Urine Negative     Barbiturates Screen, Urine Negative     Benzodiazepine Screen, Urine Negative     Cocaine Screen, Urine Negative     Methadone Screen, Urine Negative     Opiate Screen Negative     Phencyclidine (PCP), Urine Negative     THC, Screen, Urine Negative    Narrative:       Negative Thresholds For Drugs Screened in Urine:    Amphetamines          500 ng/ml  Barbiturates          200 ng/ml  Benzodiazepines       200 ng/ml  Cocaine               150 ng/ml  Methadone             150 ng/ml  Opiates               300 ng/ml  Phencyclidine         25 ng/ml  THC                      50 ng/ml    The normal value for all drugs tested is negative. This report includes final unconfirmed screening results.  A positive result by this assay can be, at your request, sent to the Reference Lab for confirmation by gas chromatography. Unconfirmed results must not be used for non-medical purposes, such as employment or legal testing. Clinical consideration should be applied to any drug of abuse test result, particularly when unconfirmed results are used.    Valproic Acid Level, Total [[id number]]  (Abnormal) Collected:   07/16/19 0510    Specimen:  Blood Updated:  07/16/19 0613     Valproic Acid 24.1 mcg/mL     Comprehensive Metabolic Panel [[id number]]  (Abnormal) Collected:  07/16/19 0510    Specimen:  Blood Updated:  07/16/19 0612     Glucose 74 mg/dL      BUN 40 mg/dL      Creatinine 1.14 mg/dL      Sodium 141 mmol/L      Potassium 4.9 mmol/L      Chloride 104 mmol/L      CO2 31.0 mmol/L      Calcium 9.2 mg/dL      Total Protein 5.6 g/dL      Albumin 2.90 g/dL      ALT (SGPT) 61 U/L      AST (SGOT) 34 U/L      Alkaline Phosphatase 53 U/L      Total Bilirubin 0.7 mg/dL      eGFR Non African Amer 67 mL/min/1.73      Globulin 2.7 gm/dL      A/G Ratio 1.1 g/dL      BUN/Creatinine Ratio 35.1     Anion Gap 6.0 mmol/L     Narrative:       GFR Normal >60  Chronic Kidney Disease <60  Kidney Failure <15    Ammonia [[id number]]  (Abnormal) Collected:  07/16/19 0510    Specimen:  Blood Updated:  07/16/19 0601     Ammonia <9 umol/L     CBC (No Diff) [[id number]]  (Abnormal) Collected:  07/16/19 0510    Specimen:  Blood Updated:  07/16/19 0553     WBC 9.92 10*3/mm3      RBC 4.72 10*6/mm3      Hemoglobin 13.7 g/dL      Hematocrit 42.2 %      MCV 89.4 fL      MCH 29.0 pg      MCHC 32.5 g/dL      RDW 16.3 %      RDW-SD 53.4 fl      MPV 11.8 fL      Platelets 112 10*3/mm3     Vitamin B12 [[id number]]  (Normal) Collected:  07/15/19 1103    Specimen:  Blood Updated:  07/15/19 1805     Vitamin B-12 845 pg/mL     Valproic Acid Level, Total [[id number]]  (Normal) Collected:  07/15/19 1742    Specimen:  Blood Updated:  07/15/19 1803     Valproic Acid 80.7 mcg/mL     Magnesium [[id number]]  (Normal) Collected:  07/15/19 1225    Specimen:  Blood Updated:  07/15/19 1720     Magnesium 2.2 mg/dL     Comprehensive Metabolic Panel [[id number]]  (Abnormal) Collected:  07/15/19 1225    Specimen:  Blood Updated:  07/15/19 1326     Glucose 71 mg/dL      BUN 37 mg/dL      Creatinine 1.10 mg/dL      Sodium 140 mmol/L      Potassium 4.7 mmol/L      Chloride 102 mmol/L       CO2 29.0 mmol/L      Calcium 8.9 mg/dL      Total Protein 5.9 g/dL      Albumin 3.00 g/dL      ALT (SGPT) 61 U/L      AST (SGOT) 46 U/L      Alkaline Phosphatase 62 U/L      Total Bilirubin 0.4 mg/dL      eGFR Non African Amer 70 mL/min/1.73      Globulin 2.9 gm/dL      A/G Ratio 1.0 g/dL      BUN/Creatinine Ratio 33.6     Anion Gap 9.0 mmol/L     Narrative:       GFR Normal >60  Chronic Kidney Disease <60  Kidney Failure <15    CBC & Differential [[id number]] Collected:  07/15/19 1225    Specimen:  Blood Updated:  07/15/19 1254    Narrative:       The following orders were created for panel order CBC & Differential.  Procedure                               Abnormality         Status                     ---------                               -----------         ------                     CBC Auto Differential[[id number]]        Abnormal            Final result                 Please view results for these tests on the individual orders.    CBC Auto Differential [[id number]]  (Abnormal) Collected:  07/15/19 1225    Specimen:  Blood Updated:  07/15/19 1254     WBC 8.81 10*3/mm3      RBC 4.96 10*6/mm3      Hemoglobin 14.5 g/dL      Hematocrit 45.1 %      MCV 90.9 fL      MCH 29.2 pg      MCHC 32.2 g/dL      RDW 17.0 %      RDW-SD 55.3 fl      MPV 11.1 fL      Platelets 124 10*3/mm3      Neutrophil % 81.0 %      Lymphocyte % 13.7 %      Monocyte % 3.5 %      Eosinophil % 0.0 %      Basophil % 0.2 %      Neutrophils, Absolute 7.13 10*3/mm3      Lymphocytes, Absolute 1.21 10*3/mm3      Monocytes, Absolute 0.31 10*3/mm3      Eosinophils, Absolute 0.00 10*3/mm3      Basophils, Absolute 0.02 10*3/mm3     Valproic Acid Level, Total [[id number]]  (Abnormal) Collected:  07/15/19 1103    Specimen:  Blood Updated:  07/15/19 1203     Valproic Acid 140.2 mcg/mL         Imaging Results (last 24 hours)     ** No results found for the last 24 hours. **      Results for EMILIANA WAHL EDD (MRN [id number]) as of 7/16/2019 15:16   Ref.  Range 7/3/2019 05:41 7/15/2019 11:03 7/15/2019 17:42 7/16/2019 05:10   Valproic Acid 50.0 - 100.0 mcg/mL 85.1 140.2 (H) 80.7 24.1 (L)       Assessment/Plan     Hospital Problem List      Metabolic encephalopathy    Seizures (CMS/HCC)    Anemia    Elevated transaminase level    Inoperable anaplastic glioma of brain     Hyperammonemia (CMS/HCC)    Valproic acid toxicity, possible     Left-sided weakness    Impression:  1. Valproate toxicity now resolved and will need IV Depacon  Was started on this before result of only 24 was received. However per mother it was after receiving Depacon that he became even more sedated  2. Hallucinations--he had them today per mother  3. Seizures  4. Malignant astrocytoma of brain  5. Elevated liver functions  6. Hypersomnolence contributed by meds, medical condition, and sleep apnea  7. Sleep apnea   Untreated will lower seizure threshold.     Plan:  · Patient too somnolent to take oral meds--Unfortunately due to his hypersomnolence all his meds wiill have to be given IV  · Restarted Depakote via Depacon at 1250 mg TID  · Will need very close Valproate levels monitoring as outpatient and daily levels while here for further adjustment  · Goal is to wean off of Lyrica to reduce somnolence and reduce hallucinations --was started at [facility] at high dose and not titrated. Steroids may be contributing too but they also have been reduced.         Doreen Parker MD  07/16/19  12:02 PM

## 2019-07-16 NOTE — CONSULTS
MEDICAL ONCOLOGY CONSULTATION    Pt Name: Lukasz Savage  MRN: [id number]  [id number]  YOB: 1965  Date of evaluation: 7/16/2019    Reason for Consultation:  Continuity of care    Requesting Physician:  Hospitalist    History Obtained From:  FAMILY and CHART    HISTORY OF PRESENT ILLNESS:    Mr. 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  He is completed 20 of 30 planned XRT treatments and was brought to radiation therapy yesterday in anticipation of dose #21 but reported that he had been more lethargic for 2 to 3 days.  He was taken to the emergency room for evaluation.  His mother reports that he has had no significant, obvious seizure activity but has had some trembling of the right upper extremity at times.    ONCOLOGIC HISTORY:     Diagnosis  · Anaplastic glioma, April 2019   · WHO grade 3   · IDH1/2 wild type   · MGMT non-methylated   · TERT mutation   · Complex cytogenetics changes   Treatment summary  · Initiated chemoradiation with Temodar on 6/12/2019     Cancer history  Mr Lukasz Savage was seen in initial oncology consultation by Dr. Doyle on 5/24/2019 referred by Dr. Trevino for diagnosis of primary brain tumor, grade 3 astrocytoma. Lukasz was being seen by neurology with complaints of left facial and upper extremity.  · 4/2/2019-MRI brain with contrast showed changes in the frontal convexity with a fullness of the sulcal gyral pattern. Specifically, 4.5 x 4.5 x 3.5 cm right frontal lesion. Second, discrete hyperintense nodule measuring 1.1 x 1.9 x 1.5 cm in the subcortical white matter of the paramedian posterior right frontal lobe immediately above the corpus callosum. This was concerning for high-grade glioma.   · 4/15/2019-he underwent a craniotomy with stereotactic biopsy by Dr. Dillon Trevino at [facility]. Operative frontal lesion consistent  with anaplastic glioma WHO grade 3. Further molecular analysis at [facility] revealed IDH1/2 wild type, MGMT non-methylated, TERT mutation. Complex cytogenetics changes A maximum safe resection was not possible due to concerns of significant sequela. Second opinion was recommended at the [facility].   · 5/17/2019-he was seen by Dr. Oral Jessica. Recommended concurrent chemoradiation.   · 5/24/2019-initial oncology consultation, Dr. Doyle recommended concurrent chemoradiation with Temodar.   · 6/3/2019. CT scan of the head without contrast documented to ill-defined masslike areas of increased attenuation within the right frontal lobe. Largest measuring 3.3 cm, previously measuring 1.8 cm and second lesion in the lateral aspect of the right frontal lobe measuring up to 1.5 cm. No intracranial hemorrhage or midline shift.   · 6/3/2019- MRI of the brain with and without contrast, compared to 4/2/2019 documented masslike appearance at the right frontal lobe measuring 2.9 x 1.4 cm, previously measuring 1.7 x 1.1 cm with mass effect and possible extension into the corpus callosum. No midline shift.   · 6/14/2019-CT scan cervical spine without contrast documented no evidence of acute bony injury. Multilevel disc degeneration spondylosis.   · 6/12/2019- Initiated Temodar along with radiation therapy   · 6/17/2019- MRI of the brain with and without contrast documented 3 cm enhancing, partially necrotic tumor in the both the right corpus callosum body, consistent with known astrocytoma. Additional FLAIR signal on both sides of the right central sulcus with faint contrast enhancement in the precentral gyrus, compatible with tumor extension. No hemorrhage or brain herniation.   · 6/21/2019 - CT scan of the head without contrast hypodense focus of the right frontal lobe measuring 3.3 cm. No intracranial hemorrhage. No abnormal extra-axial fluid collection. Right frontal craniotomy changes.        Past Medical  History:    Past Medical History:   Diagnosis Date   • Anemia 6/17/2019   • Angio-edema 7/3/2017   • Anxiety    • Arthritis    • Cancer (CMS/HCC) 05/09/2019    Brain cancer diagnoised yesterday  Dr Trevino    • Clostridium difficile colitis    • Erectile dysfunction 10/6/2016   • GERD (gastroesophageal reflux disease)    • Gout    • HCAP (healthcare-associated pneumonia) 7/11/2017   • Hyperlipidemia    • Malignant hypertension    • MSSA (methicillin susceptible Staphylococcus aureus) infection 7/31/2017   • Obstructive sleep apnea    • S/P shoulder surgery 7/27/2018   • Seizures (CMS/HCC) 7/4/2017       Past Surgical History:    Past Surgical History:   Procedure Laterality Date   • COLONOSCOPY     • CRANIOTOMY Right 4/15/2019    Procedure: CRANIOTOMY WITH BIOPSY RIGHT;  Surgeon: Dillon Trevino MD;  Location: [facility] OR;  Service: Neurosurgery   • SHOULDER ARTHROSCOPY Left    • TRACHEOSTOMY N/A 7/12/2017    Procedure: TRACHEOSTOMY WITH TRACHEOSCOPY;  Surgeon: Jairon Pierson MD;  Location: [facility] OR;  Service:        Immunizations:    Immunization History   Administered Date(s) Administered   • Flu Vaccine Quad PF >36MO 11/20/2017   • flucelvax quad pfs =>4 YRS 11/20/2018       Current Hospital Medications:      Current Facility-Administered Medications:   •  acetaminophen (TYLENOL) tablet 650 mg, 650 mg, Oral, Q4H PRN, Jairon Webb MD  •  dexamethasone (DECADRON) injection 2 mg, 2 mg, Intravenous, Q6H, Jairon Webb MD, 2 mg at 07/16/19 0556  •  lacosamide (VIMPAT) 150 mg in sodium chloride 0.9 % 50 mL IVPB, 150 mg, Intravenous, Q12H, Last Rate: 100 mL/hr at 07/15/19 2135, 150 mg at 07/15/19 2135 **OR** lacosamide (VIMPAT) tablet 150 mg, 150 mg, Oral, Q12H, Doreen Zuniga MD  •  lactulose solution 20 g, 20 g, Oral, Daily PRN, Jairon Webb MD  •  levETIRAcetam (KEPPRA) 1,250 mg in sodium chloride 0.9 % 100 mL IVPB, 1,250 mg, Intravenous, Q12H, 1,250 mg at 07/15/19 9005 **OR**  levETIRAcetam (KEPPRA) tablet 1,250 mg, 1,250 mg, Oral, Q12H, Doreen Zuniga MD  •  levOCARNitine (CARNITOR) 1 GM/10ML solution 500 mg, 500 mg, Oral, Q12H, Jairon Webb MD  •  LORazepam (ATIVAN) injection 1 mg, 1 mg, Intravenous, Q4H PRN, Jairon Webb MD  •  ondansetron (ZOFRAN) tablet 4 mg, 4 mg, Oral, Q6H PRN **OR** ondansetron (ZOFRAN) injection 4 mg, 4 mg, Intravenous, Q6H PRN, Jairon Webb MD  •  pantoprazole (PROTONIX) EC tablet 40 mg, 40 mg, Oral, Q AM, Jairon Webb MD  •  Pharmacy Meds to Bed Consult, , Does not apply, Daily, Jairon Webb MD, Stopped at 19 0118  •  pregabalin (LYRICA) capsule 75 mg, 75 mg, Oral, Q12H, Doreen Zuniga MD  •  sodium chloride 0.9 % flush 3 mL, 3 mL, Intravenous, Q12H, Jairon Webb MD, 3 mL at 07/15/19 2135  •  sodium chloride 0.9 % flush 3-10 mL, 3-10 mL, Intravenous, PRN, Jairon Webb MD  •  sulfamethoxazole-trimethoprim (BACTRIM DS,SEPTRA DS) 800-160 MG per tablet 160 mg, 1 tablet, Oral, Once per day on , Jairon Webb MD  •  venlafaxine XR (EFFEXOR-XR) 24 hr capsule 150 mg, 150 mg, Oral, Daily, Jairon Webb MD  •  vitamin B-6 (PYRIDOXINE) tablet 100 mg, 100 mg, Oral, Daily, Jairon Webb MD    Allergies:   Allergies   Allergen Reactions   • Lipitor [Atorvastatin] Rash   • Lisinopril Angioedema     Swell tongue       Social History:    Social History     Socioeconomic History   • Marital status: Single     Spouse name: Not on file   • Number of children: 2   • Years of education: Not on file   • Highest education level: Not on file   Occupational History   • Occupation: ELECTRIAL WORK   Tobacco Use   • Smoking status: Former Smoker     Packs/day: 0.33     Years: 30.00     Pack years: 9.90     Types: Cigarettes     Last attempt to quit: 7/3/2017     Years since quittin.0   • Smokeless tobacco: Never Used   Substance and Sexual Activity   • Alcohol use: No     Frequency:  "Never     Comment: used to drink alot but now just ocasional beer since has seizure in 2017   • Drug use: No   • Sexual activity: Defer       Family History:   Family History   Problem Relation Age of Onset   • Hypertension Mother    • Diabetes Mother    • Heart disease Father    • Hypertension Father    • No Known Problems Daughter    • No Known Problems Son    • Diabetes Maternal Grandmother    • No Known Problems Maternal Grandfather    • No Known Problems Paternal Grandmother    • Heart attack Paternal Grandfather    • Kidney disease Sister        REVIEW OF SYSTEMS  CONSTITUTIONAL: no fever; he was less interactive but he is starting to interact better now.  HEENT: no epistaxis  LUNGS: no cough, no hemoptysis, no wheeze, no shortness of breath;  CARDIOVASCULAR: no palpitation, no chest pain, no shortness of breath;  GI: no abdominal pain, no nausea, no vomiting, no diarrhea, no constipation;  DIPAK: no dysuria, no hematuria, no frequency or urgency, no nephrolithiasis;  MUSCULOSKELETAL: no joint pain, no swelling, no stiffness;  ENDOCRINE: no polyuria, no polydipsia, no cold or heat intolerance;  HEMATOLOGY: no easy bruising or bleeding, no history of clotting disorder;  DERMATOLOGY: no skin rash, no eczema, no pruritus;  PSYCHIATRY: no suicidal ideation, no homicidal ideation;  NEUROLOGY: Lethargy has improved.  He continues with left-sided weakness.    Vitals:    /90 (BP Location: Left arm, Patient Position: Lying)   Pulse 58   Temp 98.1 °F (36.7 °C) (Oral)   Resp 18   Ht 172.7 cm (68\")   Wt 86.9 kg (191 lb 9.6 oz)   SpO2 97%   BMI 29.13 kg/m²     PHYSICAL EXAM:   CONSTITUTIONAL: NAD, interactive this morning, talkative  EYES: Non icteric  ENT: Mucus membranes dry  NECK: Supple, no masses.  No palpable thyroid mass  CHEST/LUNGS: CTA bilaterally, normal respiratory effort   CARDIOVASCULAR: RRR, no murmurs.  No lower extremity edema  ABDOMEN: soft non-tender, active bowel sounds, no HSM.  No palpable " masses  EXTREMITIES: No edema  SKIN: warm, dry with no rashes or lesions  LYMPH: No cervical, clavicular, axillary, or inguinal lymphadenopathy  NEUROLOGIC: Chronic left-sided weakness, interactive, answering questions.  PSYCH: mood and affect appropriate.    CBC  Results from last 7 days   Lab Units 07/16/19  0510 07/15/19  1225   WBC 10*3/mm3 9.92 8.81   HEMOGLOBIN g/dL 13.7* 14.5   HEMATOCRIT % 42.2 45.1   PLATELETS 10*3/mm3 112* 124*         Lab Results   Component Value Date     07/16/2019    K 4.9 07/16/2019     07/16/2019    CO2 31.0 07/16/2019    BUN 40 (H) 07/16/2019    CREATININE 1.14 07/16/2019    GLUCOSE 74 07/16/2019    CALCIUM 9.2 07/16/2019    BILITOT 0.7 07/16/2019    ALKPHOS 53 07/16/2019    AST 34 07/16/2019    ALT 61 (H) 07/16/2019    AGRATIO 1.1 07/16/2019    GLOB 2.7 07/16/2019       Lab Results   Component Value Date    INR 0.94 06/21/2019    INR 0.98 06/03/2019    INR 0.94 03/22/2018    PROTIME 12.8 06/21/2019    PROTIME 13.3 06/03/2019    PROTIME 12.8 03/22/2018       Cultures:    Lab Results   Component Value Date    BLOODCX No growth at 5 days 06/15/2019     No components found for: URINCX     CT HEAD WO CONTRAST- 7/15/2019 10:55 AM CDT     HISTORY: Fatigue     COMPARISON: CT head without contrast 06/29/2019     DOSE: 672 mGy-cm     TECHNIQUE: Sequential imaging was performed from the vertex through the  base of the skull without the use of IV contrast.  Sagittal and coronal  reformations were made from the original source data and reviewed.  Automated exposure control was also utilized to decrease patient  radiation dose.     FINDINGS:   There is redemonstration of a hyperdense lesion in the parafalcine left  frontal lobe which measures up to 3.2 cm in size, stable compared to the  previous exam. There is a right craniotomy defect with hyperdense  material noted in the brain parenchyma just deep to this, also stable  from the prior exam. There is no evidence of new  intracranial  hemorrhage. There is no evidence of acute territorial infarct.  Ventricles appear normal in configuration. The basilar cisterns are  patent. Posterior fossa appears grossly normal. Paranasal sinuses and  mastoid air cells appear clear. Of        IMPRESSION:  Stable appearance of the brain compared to the exam from  06/29/2019, with hyperdense masses in the right frontal lobe without  associated midline shift. Right craniotomy defect is noted with a  hyperdense area just deep to it, also stable from the previous exam.  This may represent an additional mass. No acute intracranial findings  are appreciated.     This report was finalized on 07/15/2019 10:58 by Dr. Driss Ward MD.    ASSESSMENT/PLAN:    Anaplastic grade 3 glioma    XRT to the brain -completed 20 of 30 planned thus far.  Temodar 150 mg daily M-F with XRT - will resume if XRT resumed during hospitalization.    CT head stable    WBC 9.92  Hgb 13.7  ,000    Progressive lethargy    Valproic acid level 140.2 on admission 7/15/2019  -  24.1 this am    He is more interactive today.    Antiseizure medication  Keppra 1250 mg IV every 12 hours          YEVGENIY Hammond    07/16/19  6:43 AM

## 2019-07-16 NOTE — THERAPY EVALUATION
Acute Care - Occupational Therapy Initial Evaluation  [facility]     Patient Name: Lukasz Savage  : 1965  MRN: [id number]  Today's Date: 2019  Onset of Illness/Injury or Date of Surgery: 07/15/19  Date of Referral to OT: 07/15/19  Referring Physician: Dr. Webb    Admit Date: 7/15/2019       ICD-10-CM ICD-9-CM   1. Altered mental status, unspecified altered mental status type R41.82 780.97   2. Valproic acid toxicity, accidental or unintentional, initial encounter T42.6X1A 966.3     E855.0   3. Impaired functional mobility, balance, gait, and endurance Z74.09 V49.89   4. Dysphagia, unspecified type R13.10 787.20   5. Impaired mobility and ADLs Z74.09 799.89     Patient Active Problem List   Diagnosis   • HTN (hypertension)   • Seizures (CMS/HCC)   • Anemia   • Hypoglycemia   • Left hemiparesis (CMS/HCC)   • Elevated transaminase level   • Inoperable anaplastic glioma of brain    • Hyperammonemia (CMS/HCC)   • Altered mental status   • Metabolic encephalopathy   • Valproic acid toxicity, possible    • Left-sided weakness     Past Medical History:   Diagnosis Date   • Anemia 2019   • Angio-edema 7/3/2017   • Anxiety    • Arthritis    • Cancer (CMS/HCC) 2019    Brain cancer diagnoised yesterday  Dr Trevino    • Clostridium difficile colitis    • Erectile dysfunction 10/6/2016   • GERD (gastroesophageal reflux disease)    • Gout    • HCAP (healthcare-associated pneumonia) 2017   • Hyperlipidemia    • Malignant hypertension    • MSSA (methicillin susceptible Staphylococcus aureus) infection 2017   • Obstructive sleep apnea    • S/P shoulder surgery 2018   • Seizures (CMS/HCC) 2017     Past Surgical History:   Procedure Laterality Date   • COLONOSCOPY     • CRANIOTOMY Right 4/15/2019    Procedure: CRANIOTOMY WITH BIOPSY RIGHT;  Surgeon: Dillon Trevino MD;  Location: [facility];  Service: Neurosurgery   • SHOULDER ARTHROSCOPY Left    • TRACHEOSTOMY N/A 2017     Procedure: TRACHEOSTOMY WITH TRACHEOSCOPY;  Surgeon: Jairon Pierson MD;  Location: [facility] OR;  Service:           OT ASSESSMENT FLOWSHEET (last 12 hours)      Occupational Therapy Evaluation     Row Name 07/16/19 0750                   OT Evaluation Time/Intention    Subjective Information  complains of;pain;weakness L sided weakness  -CS        Document Type  evaluation  -CS           General Information    Patient Profile Reviewed?  yes  -CS        Onset of Illness/Injury or Date of Surgery  07/15/19  -CS        Referring Physician  Dr. Webb  -CS        Patient Observations  alert;agree to therapy;cooperative  -CS        Patient/Family Observations  no family present  -CS        General Observations of Patient  fowlers, cont pulse ox  -CS        Prior Level of Function  mod assist:;max assist:;bathing;dressing;independent:;all household mobility  -CS        Equipment Currently Used at Home  walker, rolling;cane, straight;shower chair  -CS        Pertinent History of Current Functional Problem  Pt has progressive brain tumor that is non-operative leaving his L side weak. Dx: Stage 3 anaplastic glioma, Metabolic encephalopathy, seizures   -CS        Existing Precautions/Restrictions  fall  -CS           Relationship/Environment    Primary Source of Support/Comfort  parent  -CS        Name of Support/Comfort Primary Source  mother  -CS           Resource/Environmental Concerns    Current Living Arrangements  home/apartment/condo  -CS           Home Main Entrance    Number of Stairs, Main Entrance  two  -CS        Stair Railings, Main Entrance  railing on right side (ascending)  -CS           Cognitive Assessment/Interventions    Additional Documentation  Cognitive Assessment/Intervention (Group)  -CS           Cognitive Assessment/Intervention- PT/OT    Affect/Mental Status (Cognitive)  confused;low arousal/lethargic  -CS        Orientation Status (Cognition)  oriented to;person;place;situation  -CS         Follows Commands (Cognition)  follows one step commands;over 90% accuracy  -CS        Cognitive Function (Cognitive)  safety deficit  -CS        Safety Deficit (Cognitive)  awareness of need for assistance;insight into deficits/self awareness  -CS        Personal Safety Interventions  fall prevention program maintained;gait belt;nonskid shoes/slippers when out of bed;supervised activity  -CS        Cognitive Assessment/Intervention Comment  Mild hallucinations noted  -CS           Safety Issues, Functional Mobility    Safety Issues Affecting Function (Mobility)  awareness of need for assistance;insight into deficits/self awareness  -CS           Bed Mobility Assessment/Treatment    Bed Mobility Assessment/Treatment  supine-sit;sit-supine  -CS        Supine-Sit [place] (Bed Mobility)  contact guard  -CS        Sit-Supine [place] (Bed Mobility)  contact guard  -CS        Assistive Device (Bed Mobility)  draw sheet;bed rails  -CS           Functional Mobility    Functional Mobility- Ind. Level  contact guard assist;minimum assist (75% patient effort);verbal cues required;nonverbal cues required (demo/gesture)  -        Functional Mobility- Device  -- hand held assist R hand  -CS        Functional Mobility- Comment  Pt walked down hallway and back to bedside.  Pt reports mild dizziness during functional mobility  -CS           Transfer Assessment/Treatment    Transfer Assessment/Treatment  sit-stand transfer;stand-sit transfer  -CS           Sit-Stand Transfer    Sit-Stand Spicer (Transfers)  contact guard;verbal cues  -CS           Stand-Sit Transfer    Stand-Sit [place] (Transfers)  contact guard;verbal cues  -CS           ADL Assessment/Intervention    BADL Assessment/Intervention  lower body dressing  -CS           Lower Body Dressing Assessment/Training    Lower Body Dressing Sp[place] Level  don;doff;contact guard assist;minimum assist (75% patient effort);verbal cues  -CS        Lower  Body Dressing Position  edge of bed sitting  -CS        Comment (Lower Body Dressing)  CGA for balance, min A due to impaired   -CS           BADL Safety/Performance    Impairments, BADL Safety/Performance  balance;cognition;endurance/activity tolerance;strength;range of motion;motor planning;motor control;coordination  -CS           General ROM    GENERAL ROM COMMENTS  BUE AROM WFL with 25% impairment of L shoulder flexion.  All other joints WNL AROM  -CS           MMT (Manual Muscle Testing)    General MMT Comments  RUE 4/5, LUE: 3+/5  -CS           Motor Assessment/Interventions    Additional Documentation  Fine Motor Testing & Training (Group);Gross Motor Coordination (Group)  -CS           Gross Motor Coordination    Gross Motor Skill, Impairments Detail  PETE impaired LUE  -CS           Fine Motor Testing & Training    Comment, Fine Motor Coordination  LUE hand opposition impaired   -CS           Sensory Assessment/Intervention    Sensory General Assessment  no sensation deficits identified  -CS           Positioning and Restraints    Pre-Treatment Position  in bed  -CS        Post Treatment Position  bed  -CS        In Bed  side lying right;call light within reach;encouraged to call for assist;with family/caregiver;side rails up x2;exit alarm on  -CS           Pain Assessment    Additional Documentation  Pain Scale: Numbers Pre/Post-Treatment (Group)  -CS           Pain Scale: Numbers Pre/Post-Treatment    Pain Scale: Numbers, Pretreatment  4/10  -CS        Pain Location - Side  Left  -CS        Pain Location  -- buttocks  -CS        Pain Intervention(s)  Medication (See MAR);Repositioned;Ambulation/increased activity  -CS           Plan of Care Review    Plan of Care Reviewed With  patient;parent  -CS           Clinical Impression (OT)    Date of Referral to OT  07/15/19  -CS        OT Diagnosis  Impaired ADLs and functional mobility  -CS        Patient/Family Goals Statement (OT Eval)  to go home  -CS         Criteria for Skilled Therapeutic Interventions Met (OT Eval)  yes;treatment indicated  -CS        Rehab Potential (OT Eval)  good, to achieve stated therapy goals  -CS        Therapy Frequency (OT Eval)  5 times/wk  -CS        Predicted Duration of Therapy Intervention (Therapy Eval)  until hospital discharge  -CS        Care Plan Review (OT)  evaluation/treatment results reviewed;care plan/treatment goals reviewed;risks/benefits reviewed;current/potential barriers reviewed;patient/other agree to care plan  -CS        Care Plan Review, Other Participant (OT Eval)  mother  -CS        Anticipated Discharge Disposition (OT)  inpatient rehabilitation facility;home with home health;home with assist  -CS           Vital Signs    Pretreatment Heart Rate (beats/min)  58  -CS        Pre SpO2 (%)  95  -CS        O2 Delivery Pre Treatment  room air  -CS           Planned OT Interventions    Planned Therapy Interventions (OT Eval)  activity tolerance training;BADL retraining;cognitive/visual perception retraining;functional balance retraining;transfer/mobility retraining;strengthening exercise;ROM/therapeutic exercise;patient/caregiver education/training;occupation/activity based interventions  -CS           OT Goals    Transfer Goal Selection (OT)  transfer, OT goal 1  -CS        Bathing Goal Selection (OT)  --  -CS        Dressing Goal Selection (OT)  dressing, OT goal 1  -CS        Toileting Goal Selection (OT)  toileting, OT goal 1  -CS           Transfer Goal 1 (OT)    Activity/Assistive Device (Transfer Goal 1, OT)  sit-to-stand/stand-to-sit;bed-to-chair/chair-to-bed;toilet;shower chair;walk-in shower  -CS        [place] Level/Cues Needed (Transfer Goal 1, OT)  verbal cues required;supervision required  -CS        Time Frame (Transfer Goal 1, OT)  10 days  -CS        Progress/Outcome (Transfer Goal 1, OT)  goal ongoing  -CS           Bathing Goal 1 (OT)    Activity/Assistive Device (Bathing Goal 1, OT)  bathing  skills, all  -CS        [place] Level/Cues Needed (Bathing Goal 1, OT)  minimum assist (75% or more patient effort);verbal cues required  -CS        Time Frame (Bathing Goal 1, OT)  10 days  -CS        Progress/Outcomes (Bathing Goal 1, OT)  goal ongoing  -CS           Dressing Goal 1 (OT)    Activity/Assistive Device (Dressing Goal 1, OT)  dressing skills, all  -CS        [place]/Cues Needed (Dressing Goal 1, OT)  standby assist;verbal cues required  -CS        Time Frame (Dressing Goal 1, OT)  10 days  -CS        Progress/Outcome (Dressing Goal 1, OT)  goal ongoing  -CS           Toileting Goal 1 (OT)    Activity/Device (Toileting Goal 1, OT)  toileting skills, all;commode  -CS        [place] Level/Cues Needed (Toileting Goal 1, OT)  supervision required;verbal cues required  -CS        Time Frame (Toileting Goal 1, OT)  10 days  -CS        Progress/Outcome (Toileting Goal 1, OT)  goal ongoing  -CS           Living Environment    Home Accessibility  stairs to enter home;other (see comments) walk in shower  -CS          User Key  (r) = Recorded By, (t) = Taken By, (c) = Cosigned By    Initials Name Effective Dates    CS Donna Hong, OTR/L, CNT 04/02/19 -          Occupational Therapy Education     Title: PT OT SLP Therapies (In Progress)     Topic: Occupational Therapy (In Progress)     Point: ADL training (Done)     Description: Instruct learner(s) on proper safety adaptation and remediation techniques during self care or transfers.   Instruct in proper use of assistive devices.    Learning Progress Summary           Patient Acceptance, E,TB, VU by  at 7/16/2019  9:33 AM    Comment:  OT POC, benefits of activity, discharge planning, ADLs, transfer safety   Family Acceptance, E,TB, VU by  at 7/16/2019  9:33 AM    Comment:  OT POC, benefits of activity, discharge planning, ADLs, transfer safety                   Point: Home exercise program (Done)     Description: Instruct learner(s) on  appropriate technique for monitoring, assisting and/or progressing therapeutic exercises/activities.    Learning Progress Summary           Patient Acceptance, E,TB, VU by  at 7/16/2019  9:33 AM    Comment:  OT POC, benefits of activity, discharge planning, ADLs, transfer safety   Family Acceptance, E,TB, VU by  at 7/16/2019  9:33 AM    Comment:  OT POC, benefits of activity, discharge planning, ADLs, transfer safety                               User Key     Initials Effective Dates Name Provider Type Discipline     04/02/19 -  Donna Hong, OTR/L, CNT Occupational Therapist OT                  OT Recommendation and Plan  Outcome Summary/Treatment Plan (OT)  Anticipated Discharge Disposition (OT): inpatient rehabilitation facility, home with home health, home with assist  Planned Therapy Interventions (OT Eval): activity tolerance training, BADL retraining, cognitive/visual perception retraining, functional balance retraining, transfer/mobility retraining, strengthening exercise, ROM/therapeutic exercise, patient/caregiver education/training, occupation/activity based interventions  Therapy Frequency (OT Eval): 5 times/wk  Plan of Care Review  Plan of Care Reviewed With: patient  Plan of Care Reviewed With: patient  Outcome Summary: OT evaluation completed.  Pt demo need for skilled OT services due to CGA/min A required for LB dressing, CGA for bed mobility and functional transfers and intermittent need for min A with functional mobility due to significant LOB.  HH assist also provided throughout functional mobility.  Pt has limited independence with ADLs and functional mobility due to impaired balance, impaired strength, decreased safety awareness, confusion, impaired LUE coordination, and decreased activity tolerance.  OT will cont to follow however it is recommended for pt to discharge to inpatient rehab prior to returning home with his mother and HH OT and PT in order to increased his strength and  independence and decrease the burden of care for his mother.    Outcome Measures     Row Name 07/16/19 0800 07/16/19 0750          How much help from another person do you currently need...    Turning from your back to your side while in flat bed without using bedrails?  3  (Pended)   -AL  --     Moving from lying on back to sitting on the side of a flat bed without bedrails?  3  (Pended)   -AL  --     Moving to and from a bed to a chair (including a wheelchair)?  3  (Pended)   -AL  --     Standing up from a chair using your arms (e.g., wheelchair, bedside chair)?  3  (Pended)   -AL  --     Climbing 3-5 steps with a railing?  3  (Pended)   -AL  --     To walk in hospital room?  3  (Pended)   -AL  --     AM-PAC 6 Clicks Score (PT)  18  (Pended)   -CS (r) AL (t)  --        How much help from another is currently needed...    Putting on and taking off regular lower body clothing?  --  3  -CS     Bathing (including washing, rinsing, and drying)  --  3  -CS     Toileting (which includes using toilet bed pan or urinal)  --  3  -CS     Putting on and taking off regular upper body clothing  --  3  -CS     Taking care of personal grooming (such as brushing teeth)  --  3  -CS     Eating meals  --  3  -CS     AM-PAC 6 Clicks Score (OT)  --  18  -CS        Functional Assessment    Outcome Measure Options  AM-PAC 6 Clicks Basic Mobility (PT)  (Pended)   -AL  AM-PAC 6 Clicks Daily Activity (OT)  -CS       User Key  (r) = Recorded By, (t) = Taken By, (c) = Cosigned By    Initials Name Provider Type    CS Donna Hong S, OTR/L, CNT Occupational Therapist    Brandon Silver, PT Student PT Student          Time Calculation:   Time Calculation- OT     Row Name 07/16/19 0937             Time Calculation- OT    OT Start Time  0750  -CS      OT Stop Time  0846  -CS      OT Time Calculation (min)  56 min  -CS      Total Timed Code Minutes- OT  56 minute(s)  -CS      OT Received On  07/16/19  -      OT Goal Re-Cert Due Date  07/26/19  -CS         User Key  (r) = Recorded By, (t) = Taken By, (c) = Cosigned By    Initials Name Provider Type    CS Donna Hong OTR/L, BETH Occupational Therapist        Therapy Charges for Today     Code Description Service Date Service Provider Modifiers Qty    08142867638 HC OT EVAL MOD COMPLEXITY 4 7/16/2019 Donna Hong OTR/L, BETH GO 1               Donna S. Hong, OTR/L, BETH  7/16/2019

## 2019-07-16 NOTE — PLAN OF CARE
Problem: Fall Risk (Adult)  Goal: Identify Related Risk Factors and Signs and Symptoms  Outcome: Ongoing (interventions implemented as appropriate)    Goal: Absence of Fall  Outcome: Ongoing (interventions implemented as appropriate)      Problem: Confusion, Acute (Adult)  Goal: Identify Related Risk Factors and Signs and Symptoms  Outcome: Ongoing (interventions implemented as appropriate)    Goal: Cognitive/Functional Impairments Minimized  Outcome: Ongoing (interventions implemented as appropriate)    Goal: Safety  Outcome: Ongoing (interventions implemented as appropriate)      Problem: Patient Care Overview  Goal: Plan of Care Review  Outcome: Ongoing (interventions implemented as appropriate)   07/16/19 1402   Coping/Psychosocial   Plan of Care Reviewed With patient;mother   Plan of Care Review   Progress improving   OTHER   Outcome Summary Pt disoriented to time. Confused at times. Unsteady gait. Up x 1 with cane/walker. LUE weakness. C/o Left lower back pain. Position adjusted frequently with pillows, pt states this is helping his back. Likes pills crushed with applesauce. Safety maintained. Will continue to monitor. Mother at bed side.      Goal: Individualization and Mutuality  Outcome: Ongoing (interventions implemented as appropriate)    Goal: Discharge Needs Assessment  Outcome: Ongoing (interventions implemented as appropriate)    Goal: Interprofessional Rounds/Family Conf  Outcome: Ongoing (interventions implemented as appropriate)      Problem: Skin Injury Risk (Adult)  Goal: Identify Related Risk Factors and Signs and Symptoms  Outcome: Ongoing (interventions implemented as appropriate)    Goal: Skin Health and Integrity  Outcome: Ongoing (interventions implemented as appropriate)

## 2019-07-16 NOTE — PROGRESS NOTES
Palliative care referral made on this date, consulted with palliative care nurse regarding patient's needs.

## 2019-07-16 NOTE — PROGRESS NOTES
Continued Stay Note   [place]     Patient Name: Lukasz Savage  MRN: [id number]  Today's Date: 7/16/2019    Admit Date: 7/15/2019    Discharge Plan     Row Name 07/16/19 1516       Plan    Plan Comments  SPOKE TO PT DTR (LILIANE) AND SHE IS AGREEABLE FOR REFERRAL TO BE SENT TO [facility]. MADE REFERRAL TO [facility] ([id number]), AWAIT ANSWER. IF BED OFFERED, PT INSURANCE WILL REQUIRE A PRIOR AUTH        Discharge Codes    No documentation.             MAYURI Cotto

## 2019-07-16 NOTE — THERAPY EVALUATION
Acute Care - Speech Language Pathology   Swallow Initial Evaluation [facility]     Patient Name: Lukasz Savage  : 1965  MRN: [id number]  Today's Date: 2019  Onset of Illness/Injury or Date of Surgery: 07/15/19     Referring Physician: Dr. Webb      Admit Date: 7/15/2019    Bedside Swallow Evaluation completed.  No overt s/s of aspiration.  Patient does have harsh vocal quality and left side weakness.  Family reports pocketing on the left.  SLP observed some left buccal residue but he was able to clear with cues.  Educated on compensatory strategies.  Rec:   1) Ok for Regular diet with thin liquids   2) Small bites and sips   3) Place food on right side of oral cavity   4) Liquid wash to help clear residue   5) Oral  after meals   6) ST to follow for work on compensatory strategies.  Daniela Bentley, MS CCC-SLP 2019 9:42 AM    Visit Dx:     ICD-10-CM ICD-9-CM   1. Altered mental status, unspecified altered mental status type R41.82 780.97   2. Valproic acid toxicity, accidental or unintentional, initial encounter T42.6X1A 966.3     E855.0   3. Impaired functional mobility, balance, gait, and endurance Z74.09 V49.89   4. Dysphagia, unspecified type R13.10 787.20   5. Impaired mobility and ADLs Z74.09 799.89     Patient Active Problem List   Diagnosis   • HTN (hypertension)   • Seizures (CMS/HCC)   • Anemia   • Hypoglycemia   • Left hemiparesis (CMS/HCC)   • Elevated transaminase level   • Inoperable anaplastic glioma of brain    • Hyperammonemia (CMS/HCC)   • Altered mental status   • Metabolic encephalopathy   • Valproic acid toxicity, possible    • Left-sided weakness     Past Medical History:   Diagnosis Date   • Anemia 2019   • Angio-edema 7/3/2017   • Anxiety    • Arthritis    • Cancer (CMS/HCC) 2019    Brain cancer diagnoised yesterday  Dr Trevino    • Clostridium difficile colitis    • Erectile dysfunction 10/6/2016   • GERD (gastroesophageal reflux disease)    • Gout     • HCAP (healthcare-associated pneumonia) 7/11/2017   • Hyperlipidemia    • Malignant hypertension    • MSSA (methicillin susceptible Staphylococcus aureus) infection 7/31/2017   • Obstructive sleep apnea    • S/P shoulder surgery 7/27/2018   • Seizures (CMS/HCC) 7/4/2017     Past Surgical History:   Procedure Laterality Date   • COLONOSCOPY     • CRANIOTOMY Right 4/15/2019    Procedure: CRANIOTOMY WITH BIOPSY RIGHT;  Surgeon: Dillon Trevino MD;  Location: [facility] OR;  Service: Neurosurgery   • SHOULDER ARTHROSCOPY Left    • TRACHEOSTOMY N/A 7/12/2017    Procedure: TRACHEOSTOMY WITH TRACHEOSCOPY;  Surgeon: Jairon Pierson MD;  Location: [facility] OR;  Service:         SWALLOW EVALUATION (last 72 hours)      SLP Adult Swallow Evaluation     Row Name 07/16/19 0850                   Rehab Evaluation    Document Type  evaluation  -KW        Subjective Information  no complaints  -KW        Patient Observations  alert;cooperative  -KW        Patient/Family Observations  family present  -KW        Patient Effort  good  -KW           General Information    Patient Profile Reviewed  yes  -KW        Current Method of Nutrition  NPO  -KW        Precautions/Limitations, Vision  WFL  -KW        Precautions/Limitations, Hearing  WFL  -KW        Prior Level of Function-Communication  WFL  -KW        Prior Level of Function-Swallowing  no diet consistency restrictions  -KW        Plans/Goals Discussed with  patient and family  -KW        Barriers to Rehab  medically complex;cognitive status  -KW        Patient's Goals for Discharge  return to PO diet  -KW           Pain Assessment    Additional Documentation  Pain Scale: FACES Pre/Post-Treatment (Group)  -KW           Pain Scale: FACES Pre/Post-Treatment    Pain: FACES Scale, Pretreatment  0-->no hurt  -KW        Pain: FACES Scale, Post-Treatment  0-->no hurt  -KW           Oral Motor and Function    Dentition Assessment  natural, present and adequate  -KW        Secretion  Management  WNL/WFL  -KW        Mucosal Quality  dry;sticky  -KW        Volitional Swallow  WFL  -KW        Volitional Cough  WFL  -KW           Oral Musculature and Cranial Nerve Assessment    Oral Motor General Assessment  lingual impairment;oral labial or buccal impairment;vocal impairment  -KW        Oral Labial or Buccal Impairment, Detail, Cranial Nerve VII (Facial):  CN7: Sensory Impairment;left labial droop;reduced taste on left  -KW        Lingual Impairment, Detail. Cranial Nerves IX, XII (Glossopharyngeal and Hypoglossal)  reduced lingual ROM  -KW        Vocal Impairment, Detail. Cranial Nerve X (Vagus)  vocal quality abnormality (see comments)  -KW           General Eating/Swallowing Observations    Respiratory Support Currently in Use  room air  -KW        Eating/Swallowing Skills  self-fed;fed by SLP  -KW        Positioning During Eating  upright in bed  -KW        Utensils Used  spoon;straw  -KW        Consistencies Trialed  regular textures;thin liquids  -KW        Pre SpO2 (%)  97  -KW        Post SpO2 (%)  95  -KW           Clinical Swallow Eval    Oral Prep Phase  impaired  -KW        Oral Transit  WFL  -KW        Oral Residue  impaired  -KW        Pharyngeal Phase  no overt signs/symptoms of pharyngeal impairment  -KW        Esophageal Phase  unremarkable  -KW        Clinical Swallow Evaluation Summary  Bedside Swallow Evaluation completed.  No overt s/s of aspiration.  Patient does have harsh vocal quality and left side weakness.  Family reports pocketing on the left.  SLP observed some left buccal residue but he was able to clear with cues.  Educated on compensatory strategies.  Rec: 1) Ok for Regular diet with thin liquids 2) Small bites and sips 3) Place food on right side of oral cavity 4) Liquid wash to help clear residue 5) Oral  after meals 6) ST to follow for work on compensatory strategies.  -KW           Oral Prep Concerns    Oral Prep Concerns  prolonged mastication;increased prep  time  -KW        Prolonged Mastication  regular consistencies  -KW        Increased Prep Time  regular consistencies  -KW           Oral Residue Concerns    Oral Residue Concerns  lateral sulcus residue, left  -KW        Lateral Sulcus Residue, Left  regular consistencies  -KW           Clinical Impression    SLP Swallowing Diagnosis  mild;oral dysfunction  -KW        Functional Impact  risk of malnutrition  -KW        Rehab Potential/Prognosis, Swallowing  adequate, monitor progress closely  -KW        Swallow Criteria for Skilled Therapeutic Interventions Met  demonstrates skilled criteria  -KW           Recommendations    Therapy Frequency (Swallow)  at least;3 days per week  -KW        Predicted Duration Therapy Intervention (Days)  until discharge  -KW        SLP Diet Recommendation  regular textures;thin liquids  -KW        Recommended Precautions and Strategies  upright posture during/after eating;small bites of food and sips of liquid;check mouth frequently for oral residue/pocketing;alternate between small bites of food and sips of liquid  -KW        SLP Rec. for Method of Medication Administration  meds whole;with pudding or applesauce this is how at home  -KW        Monitor for Signs of Aspiration  notify SLP if any concerns  -KW        Anticipated Dischage Disposition  home with assist  -KW           Swallow Goals (SLP)    Oral Nutrition/Hydration Goal Selection (SLP)  oral nutrition/hydration, SLP goal 1  -KW        Swallow Compensatory Strategies Goal Selection (SLP)  swallow compensatory strategies, SLP goal 1  -KW        Additional Documentation  swallow compensatory strategies goal selection (SLP)  -KW           Oral Nutrition/Hydration Goal 1 (SLP)    Oral Nutrition/Hydration Goal 1, SLP  LTG:  Patient will tolerate Regular Diet with thin liquids using compensatory strategies to reduce pcketing on the left side of oral cavity as determined by SLP  -KW        Time Frame (Oral Nutrition/Hydration  Goal 1, SLP)  by discharge  -KW        Barriers (Oral Nutrition/Hydration Goal 1, SLP)  medically complex  -KW        Progress/Outcomes (Oral Nutrition/Hydration Goal 1, SLP)  goal ongoing  -KW           Swallow Compensatory Strategies Goal 1 (SLP)    Activity (Swallow Compensatory Strategies/Techniques Goal 1, SLP)  compensatory strategies;during meal intake;small cup sips;small straw sips;small bites;food/liquid placed on stronger right side;alternate food/liquid intake;other (see comments) lingual clearing  -KW        [place]/Accuracy (Swallow Compensatory Strategies/Techniques Goal 1, SLP)  independently (over 90% accuracy)  -KW        Time Frame (Swallow Compensatory Strategies/Techniques Goal 1, SLP)  short term goal (STG);by discharge  -KW        Barriers (Swallow Compensatory Strategies/Techniques Goal 1, SLP)  medically complex  -KW        Progress/Outcomes (Swallow Compensatory Strategies/Techniques Goal 1, SLP)  goal ongoing  -KW          User Key  (r) = Recorded By, (t) = Taken By, (c) = Cosigned By    Initials Name Effective Dates    Daniela Lee MS [facility]-SLP 08/02/16 -           EDUCATION  The patient has been educated in the following areas:   Dysphagia (Swallowing Impairment).    SLP Recommendation and Plan  SLP Swallowing Diagnosis: mild, oral dysfunction  SLP Diet Recommendation: regular textures, thin liquids  Recommended Precautions and Strategies: upright posture during/after eating, small bites of food and sips of liquid, check mouth frequently for oral residue/pocketing, alternate between small bites of food and sips of liquid  SLP Rec. for Method of Medication Administration: meds whole, with pudding or applesauce(this is how at home)     Monitor for Signs of Aspiration: notify SLP if any concerns     Swallow Criteria for Skilled Therapeutic Interventions Met: demonstrates skilled criteria  Anticipated Dischage Disposition: home with assist  Rehab Potential/Prognosis, Swallowing:  adequate, monitor progress closely  Therapy Frequency (Swallow): at least, 3 days per week  Predicted Duration Therapy Intervention (Days): until discharge       Plan of Care Reviewed With: patient  Plan of Care Review  Plan of Care Reviewed With: patient  Progress: improving  Outcome Summary: Bedside Swallow Evaluation completed.  No overt s/s of aspiration.  Patient does have harsh vocal quality and left side weakness.  Family reports pocketing on the left.  SLP observed some left buccal residue but he was able to clear with cues.  Educated on compensatory strategies.  Rec: 1) Ok for Regular diet with thin liquids 2) Small bites and sips 3) Place food on right side of oral cavity 4) Liquid wash to help clear residue 5) Oral  after meals 6) ST to follow for work on compensatory strategies.    SLP GOALS     Row Name 19 0850             Oral Nutrition/Hydration Goal 1 (SLP)    Oral Nutrition/Hydration Goal 1, SLP  LTG:  Patient will tolerate Regular Diet with thin liquids using compensatory strategies to reduce pcketing on the left side of oral cavity as determined by SLP  -KW      Time Frame (Oral Nutrition/Hydration Goal 1, SLP)  by discharge  -KW      Barriers (Oral Nutrition/Hydration Goal 1, SLP)  medically complex  -KW      Progress/Outcomes (Oral Nutrition/Hydration Goal 1, SLP)  goal ongoing  -KW         Swallow Compensatory Strategies Goal 1 (SLP)    Activity (Swallow Compensatory Strategies/Techniques Goal 1, SLP)  compensatory strategies;during meal intake;small cup sips;small straw sips;small bites;food/liquid placed on stronger right side;alternate food/liquid intake;other (see comments) lingual clearing  -KW      [place]/Accuracy (Swallow Compensatory Strategies/Techniques Goal 1, SLP)  independently (over 90% accuracy)  -KW      Time Frame (Swallow Compensatory Strategies/Techniques Goal 1, SLP)  short term goal (STG);by discharge  -KW      Barriers (Swallow Compensatory  Strategies/Techniques Goal 1, SLP)  medically complex  -KW      Progress/Outcomes (Swallow Compensatory Strategies/Techniques Goal 1, SLP)  goal ongoing  -KW        User Key  (r) = Recorded By, (t) = Taken By, (c) = Cosigned By    Initials Name Provider Type    Daniela Lee MS CCC-SLP Speech and Language Pathologist             Time Calculation:   Time Calculation- SLP     Row Name 07/16/19 0936             Time Calculation- SLP    SLP Start Time  0850  -KW      SLP Stop Time  0945  -KW      SLP Time Calculation (min)  55 min  -KW      SLP Received On  07/16/19  -      SLP Goal Re-Cert Due Date  07/26/19  -        User Key  (r) = Recorded By, (t) = Taken By, (c) = Cosigned By    Initials Name Provider Type    Daniela Lee MS CCC-SLP Speech and Language Pathologist          Therapy Charges for Today     Code Description Service Date Service Provider Modifiers Qty    [id number] HC ST EVAL ORAL PHARYNG SWALLOW 4 7/16/2019 Daniela Bentley MS CCC-SLP GN 1               Daniela Bentley MS CCC-ALEXIA  7/16/2019

## 2019-07-16 NOTE — PAYOR COMM NOTE
"ADMIT INPT 7-15-19  UR PHONE     Lukasz Wahl Mary (54 y.o. Male)     Date of Birth Social Security Number Address Home Phone MRN    1965  [address] [facility]  [place] [address] [phone] [id number]    [employer] Marital Status          [facility] Single       Admission Date Admission Type Admitting Provider Attending Provider Department, Room/Bed    7/15/19 Emergency Jairon Webb MD Fleming, John Eric, MD [facility] 3A, 329/1    Discharge Date Discharge Disposition Discharge Destination                       Attending Provider:  Jairon Webb MD    Allergies:  Lipitor [Atorvastatin], Lisinopril    Isolation:  None   Infection:  C.difficile (07/07/17)   Code Status:  CPR    Ht:  172.7 cm (68\")   Wt:  86.9 kg (191 lb 9.6 oz)    Admission Cmt:  None   Principal Problem:  Metabolic encephalopathy [G93.41]                 Active Insurance as of 7/15/2019     Primary Coverage     Payor Plan Insurance Group Employer/Plan Group    [facility] Answer[facility]To [facility]      Payor Plan Address Payor Plan Phone Number Payor Plan Fax Number Effective Dates    [address] [address]   7/1/2017 - None Entered    [place] [address]       Subscriber Name Subscriber Birth Date Member ID       LUKASZ WAHL MARY 1965 [id number]                 Emergency Contacts      (Rel.) Home Phone Work Phone Mobile Phone    Ivonne Wahl (Mother) [phone] -- --    Yvette Pollack (Daughter) [phone] -- [phone]               History & Physical      Jairon Webb MD at 7/15/2019  2:34 PM              [facility]  HISTORY AND PHYSICAL    Date of Admission: 7/15/2019  Primary Care Physician: Linda Hoffman, DNP, APRN    Subjective     Chief Complaint: lethargy, confusion    History of Present Illness    Mr. Wahl is a 55 yo M with an inoperable anaplastic glioma of the brain, recurrent seizures " "on multiple AEDs, and persistent left-sided weakness.  Patient presented today for radiation treatment and was found to be confused, lethargic and worsening speech.  Patient has been sleeping a lot more lately.  Daughter and mother provide most of the history.  Patient has been very lethargic in the morning and often takes several hours to get up and \"get going\".  They have not been giving him the lactulose at home as he did have some diarrhea for a while and is currently having one BM per day.  Patients left sided weakness is better than last time I cared for him but is still significantly worse than his right-side.    Long-discussion with patient and family regarding goals of care.  They want him to be full code with CPR and intubation but do not want him to have a feeding tube if it gets to that point.  They are aware of his poor prognosis and the terminal condition of his disease.    They asked about his BP having a diastolic >100.  I discussed with them avoiding any other medications that may cause weakness, sedation or potential orthostasis or falls.  Would not worry about tight BP control given the significant of his cancer.      Review of Systems   Constitutional: Positive for activity change, appetite change and fatigue. Negative for chills, diaphoresis, fever and unexpected weight change.   Respiratory: Negative for cough and shortness of breath.    Cardiovascular: Negative for chest pain and palpitations.   Gastrointestinal: Negative for abdominal distention, abdominal pain, diarrhea, nausea and vomiting.   Neurological: Positive for weakness, light-headedness and headaches.   All other systems reviewed and are negative.       Otherwise complete ROS reviewed and negative except as mentioned in the HPI.    Past Medical History:   Past Medical History:   Diagnosis Date   • Anemia 6/17/2019   • Angio-edema 7/3/2017   • Anxiety    • Arthritis    • Cancer (CMS/HCC) 05/09/2019    Brain cancer diagnoised " yesterday  Dr Trevino    • Clostridium difficile colitis    • Erectile dysfunction 10/6/2016   • GERD (gastroesophageal reflux disease)    • Gout    • HCAP (healthcare-associated pneumonia) 7/11/2017   • Hyperlipidemia    • Malignant hypertension    • MSSA (methicillin susceptible Staphylococcus aureus) infection 7/31/2017   • Obstructive sleep apnea    • S/P shoulder surgery 7/27/2018   • Seizures (CMS/HCC) 7/4/2017     Past Surgical History:  Past Surgical History:   Procedure Laterality Date   • COLONOSCOPY     • CRANIOTOMY Right 4/15/2019    Procedure: CRANIOTOMY WITH BIOPSY RIGHT;  Surgeon: Dillon Trevino MD;  Location: [facility] OR;  Service: Neurosurgery   • SHOULDER ARTHROSCOPY Left    • TRACHEOSTOMY N/A 7/12/2017    Procedure: TRACHEOSTOMY WITH TRACHEOSCOPY;  Surgeon: Jairon Pierson MD;  Location:  [facility] OR;  Service:      Social History:  reports that he quit smoking about 2 years ago. His smoking use included cigarettes. He has a 9.90 pack-year smoking history. He has never used smokeless tobacco. He reports that he does not drink alcohol or use drugs.    Family History: family history includes Diabetes in his maternal grandmother and mother; Heart attack in his paternal grandfather; Heart disease in his father; Hypertension in his father and mother; Kidney disease in his sister; No Known Problems in his daughter, maternal grandfather, paternal grandmother, and son.       Allergies:  Allergies   Allergen Reactions   • Lipitor [Atorvastatin] Rash   • Lisinopril Angioedema     Swell tongue     Medications:  Prior to Admission medications    Medication Sig Start Date End Date Taking? Authorizing Provider   allopurinol (ZYLOPRIM) 300 MG tablet Take 300 mg by mouth Daily.    Provider, MD Phil   ALPRAZolam (XANAX) 0.25 MG tablet Take 1 tablet by mouth 2 (Two) Times a Day As Needed for Anxiety. 5/20/19   Linda Hoffman, DNP, APRN   aspirin 81 MG tablet Take 1 tablet by mouth Daily. 2/28/19   Perez  YEVGENIY Tapia   dexamethasone (DECADRON) 4 MG tablet Take 1 tablet by mouth 3 (Three) Times a Day With Meals. 6/25/19   Oral Jessica III, MD   divalproex (DEPAKOTE) 500 MG DR tablet Take 3 tablets by mouth Every 8 (Eight) Hours. 6/20/19   Jairon Webb MD   fenofibrate (TRICOR) 145 MG tablet Take 1 tablet by mouth Daily. 5/20/19   Linda Hoffman DNP, APRN   lacosamide (VIMPAT) 50 MG tablet tablet Take 3 tablets by mouth Every 12 (Twelve) Hours for 30 days. 6/20/19 7/20/19  Jairon Webb MD   lactulose 20 GM/30ML solution solution Take 30 mL by mouth Daily.  Patient taking differently: Take 20 g by mouth As Needed. 7/3/19   Jairon Perez DO   levETIRAcetam (KEPPRA) 500 MG tablet Take 2.5 tablets by mouth 2 (Two) Times a Day. 6/20/19   Jairon Webb MD   losartan (COZAAR) 25 MG tablet Take 1 tablet by mouth Daily. 7/11/19   Malia Vargas MD   Omega-3 Fatty Acids (FISH OIL) 1000 MG capsule capsule Take 2 capsules by mouth Daily With Breakfast. 5/20/19   Linda Hoffman DNP, APRN   omeprazole (PRILOSEC) 10 MG capsule Take 1 capsule by mouth Daily. For acid reflux 5/20/19   Linda Hoffman DNP, APRN   oxyCODONE-acetaminophen (PERCOCET) 7.5-325 MG per tablet Take 1 tablet by mouth Every 4 (Four) Hours As Needed for Moderate Pain  or Severe Pain . 6/25/19   Oral Jessica III, MD   pregabalin (LYRICA) 100 MG capsule Take 1 capsule by mouth Every 12 (Twelve) Hours. 7/3/19   Jairon Perez DO   promethazine (PHENERGAN) 12.5 MG tablet Take 12.5 mg by mouth Every 4 (Four) Hours As Needed for Nausea or Vomiting.    Provider, MD Phil   QUEtiapine (SEROquel) 50 MG tablet Take 1 tablet by mouth Every Night. 6/20/19   Jaiorn Webb MD   sulfamethoxazole-trimethoprim (BACTRIM DS,SEPTRA DS) 800-160 MG per tablet Take 1 tablet by mouth 3 (Three) Times a Week. Monday, Wednesday, and Friday.    Provider, MD Phil   TEMOZOLOMIDE PO Take 150 mg by mouth Daily. 140 mg  "+ 10 mg daily.    Provider, MD Phil   venlafaxine XR (EFFEXOR-XR) 150 MG 24 hr capsule Take 1 capsule by mouth Daily. 5/20/19   Linda Hoffman, DNP, APRN   vitamin B-6 (PYRIDOXINE) 100 MG tablet TAKE 1 TABLET BY MOUTH DAILY. 7/10/19   Royal Todd PA     Objective     Vital Signs: /91   Pulse 79   Temp 97.7 °F (36.5 °C) (Oral)   Resp 12   Ht 172.7 cm (68\")   Wt 80.7 kg (178 lb)   SpO2 98%   BMI 27.06 kg/m²    Physical Exam   Constitutional: No distress.   HENT:   Head: Normocephalic and atraumatic.   Eyes: Conjunctivae are normal. No scleral icterus.   Neck: Neck supple. No JVD present.   Cardiovascular: Normal rate and regular rhythm. Exam reveals no gallop and no friction rub.   No murmur heard.  Pulmonary/Chest: Effort normal and breath sounds normal. No stridor. No respiratory distress. He has no wheezes.   Abdominal: Soft. Bowel sounds are normal. He exhibits no distension. There is no tenderness. There is no guarding.   Musculoskeletal: He exhibits no edema.   Neurological:   Awakens to voice and tactile stimuli but goes right back to sleep; weakness on left upper and lower extremity; normal strength on the right side   Skin: Skin is warm and dry. He is not diaphoretic. No erythema.   Psychiatric: He has a normal mood and affect. His behavior is normal.   Lethargic   Nursing note and vitals reviewed.          Results Reviewed:  Lab Results (last 24 hours)     Procedure Component Value Units Date/Time    Comprehensive Metabolic Panel [[id number]]  (Abnormal) Collected:  07/15/19 1225    Specimen:  Blood Updated:  07/15/19 1326     Glucose 71 mg/dL      BUN 37 mg/dL      Creatinine 1.10 mg/dL      Sodium 140 mmol/L      Potassium 4.7 mmol/L      Chloride 102 mmol/L      CO2 29.0 mmol/L      Calcium 8.9 mg/dL      Total Protein 5.9 g/dL      Albumin 3.00 g/dL      ALT (SGPT) 61 U/L      AST (SGOT) 46 U/L      Alkaline Phosphatase 62 U/L      Total Bilirubin 0.4 mg/dL      eGFR " Non African Amer 70 mL/min/1.73      Globulin 2.9 gm/dL      A/G Ratio 1.0 g/dL      BUN/Creatinine Ratio 33.6     Anion Gap 9.0 mmol/L     Narrative:       GFR Normal >60  Chronic Kidney Disease <60  Kidney Failure <15    CBC & Differential [[id number]] Collected:  07/15/19 1225    Specimen:  Blood Updated:  07/15/19 1254    Narrative:       The following orders were created for panel order CBC & Differential.  Procedure                               Abnormality         Status                     ---------                               -----------         ------                     CBC Auto Differential[[id number]]        Abnormal            Final result                 Please view results for these tests on the individual orders.    CBC Auto Differential [[id number]]  (Abnormal) Collected:  07/15/19 1225    Specimen:  Blood Updated:  07/15/19 1254     WBC 8.81 10*3/mm3      RBC 4.96 10*6/mm3      Hemoglobin 14.5 g/dL      Hematocrit 45.1 %      MCV 90.9 fL      MCH 29.2 pg      MCHC 32.2 g/dL      RDW 17.0 %      RDW-SD 55.3 fl      MPV 11.1 fL      Platelets 124 10*3/mm3      Neutrophil % 81.0 %      Lymphocyte % 13.7 %      Monocyte % 3.5 %      Eosinophil % 0.0 %      Basophil % 0.2 %      Neutrophils, Absolute 7.13 10*3/mm3      Lymphocytes, Absolute 1.21 10*3/mm3      Monocytes, Absolute 0.31 10*3/mm3      Eosinophils, Absolute 0.00 10*3/mm3      Basophils, Absolute 0.02 10*3/mm3     Valproic Acid Level, Total [[id number]]  (Abnormal) Collected:  07/15/19 1103    Specimen:  Blood Updated:  07/15/19 1203     Valproic Acid 140.2 mcg/mL         Imaging Results (last 24 hours)     Procedure Component Value Units Date/Time    CT Head Without Contrast [[id number]] Collected:  07/15/19 1055     Updated:  07/15/19 1102    Narrative:       EXAMINATION: CT HEAD WO CONTRAST- 7/15/2019 10:55 AM CDT     HISTORY: Fatigue     COMPARISON: CT head without contrast 06/29/2019     DOSE: 672 mGy-cm     TECHNIQUE: Sequential imaging  was performed from the vertex through the  base of the skull without the use of IV contrast.  Sagittal and coronal  reformations were made from the original source data and reviewed.  Automated exposure control was also utilized to decrease patient  radiation dose.     FINDINGS:   There is redemonstration of a hyperdense lesion in the parafalcine left  frontal lobe which measures up to 3.2 cm in size, stable compared to the  previous exam. There is a right craniotomy defect with hyperdense  material noted in the brain parenchyma just deep to this, also stable  from the prior exam. There is no evidence of new intracranial  hemorrhage. There is no evidence of acute territorial infarct.  Ventricles appear normal in configuration. The basilar cisterns are  patent. Posterior fossa appears grossly normal. Paranasal sinuses and  mastoid air cells appear clear. Of          Impression:       Stable appearance of the brain compared to the exam from  06/29/2019, with hyperdense masses in the right frontal lobe without  associated midline shift. Right craniotomy defect is noted with a  hyperdense area just deep to it, also stable from the previous exam.  This may represent an additional mass. No acute intracranial findings  are appreciated.     This report was finalized on 07/15/2019 10:58 by Dr. Driss Ward MD.        I have personally reviewed and interpreted the radiology studies and ECG obtained at time of admission.     Assessment / Plan     Assessment:   Active Hospital Problems    Diagnosis   • **Metabolic encephalopathy   • Valproic acid toxicity, possible    • Left-sided weakness   • Hyperammonemia (CMS/HCC)   • Inoperable anaplastic glioma of brain    • Elevated transaminase level   • Anemia   • Seizures (CMS/HCC)         Plan:   1.  Admit to medicine  2.  Initially was going to give IV loading of carnitine, however, as mental status improved to what appears to me to be close to his new baseline, will hold off on  aggressive carnitine treatment.  Will however give the patient 500 mg BID of carnitine as he likely is deficient  3.  Neurology consultation  4.  Oncology consultation  5.  Social work for assistance with placement  6.  Dexamethasone 2 mg q6h  7.  AEDs per neurology, resumed all except depakote for now  8.  Resume lactulose  9.  IVF   10.  SLP    Long goals of care discussion had with family.        Code Status: Full code    Daughter and mother to make decisions if patient unable     I discussed the patient's findings and my recommendations with the Dr. Simmons and family    Estimated length of stay ?    Jairon Webb MD   07/15/19   2:34 PM            Electronically signed by Jairon Webb MD at 7/15/2019 10:04 PM          Emergency Department Notes      Ned Wilson MD at 7/15/2019 10:10 AM          Subjective   Patient with altered mental status recently diagnosed astrocytoma he is more sleepy in the morning less sleepy at night may be pharmacologically mediated today is worse the family is aware about his poor prognosis        Altered Mental Status   Presenting symptoms: confusion, lethargy and partial responsiveness    Severity:  Severe  Most recent episode:  Today  Episode history:  Single  Timing:  Intermittent  Progression:  Waxing and waning  Chronicity:  Chronic  Context: recent change in medication and recent illness    Context: not alcohol use, not dementia, not drug use, not head injury, not homeless and not nursing home resident    Associated symptoms: no abdominal pain, normal movement, no bladder incontinence, no decreased appetite, no difficulty breathing, no fever, no hallucinations, no headaches, no light-headedness, no seizures, no slurred speech, no visual change, no vomiting and no weakness        Review of Systems   Unable to perform ROS: Mental status change   Constitutional: Negative for decreased appetite and fever.   Gastrointestinal: Negative for abdominal pain and  vomiting.   Genitourinary: Negative for bladder incontinence.   Neurological: Negative for seizures, weakness, light-headedness and headaches.   Psychiatric/Behavioral: Positive for confusion. Negative for hallucinations.       Past Medical History:   Diagnosis Date   • Anemia 6/17/2019   • Angio-edema 7/3/2017   • Anxiety    • Arthritis    • Cancer (CMS/[facility]) 05/09/2019    Brain cancer diagnoised yesterday  Dr Trevino    • Clostridium difficile colitis    • Erectile dysfunction 10/6/2016   • GERD (gastroesophageal reflux disease)    • Gout    • HCAP (healthcare-associated pneumonia) 7/11/2017   • Hyperlipidemia    • Malignant hypertension    • MSSA (methicillin susceptible Staphylococcus aureus) infection 7/31/2017   • Obstructive sleep apnea    • S/P shoulder surgery 7/27/2018   • Seizures (CMS/[facility]) 7/4/2017       Allergies   Allergen Reactions   • Lipitor [Atorvastatin] Rash   • Lisinopril Angioedema     Swell tongue       Past Surgical History:   Procedure Laterality Date   • COLONOSCOPY     • CRANIOTOMY Right 4/15/2019    Procedure: CRANIOTOMY WITH BIOPSY RIGHT;  Surgeon: Dillon Trevino MD;  Location:  [facility] OR;  Service: Neurosurgery   • SHOULDER ARTHROSCOPY Left    • TRACHEOSTOMY N/A 7/12/2017    Procedure: TRACHEOSTOMY WITH TRACHEOSCOPY;  Surgeon: Jairon Pierson MD;  Location:  [facility] OR;  Service:        Family History   Problem Relation Age of Onset   • Hypertension Mother    • Diabetes Mother    • Heart disease Father    • Hypertension Father    • No Known Problems Daughter    • No Known Problems Son    • Diabetes Maternal Grandmother    • No Known Problems Maternal Grandfather    • No Known Problems Paternal Grandmother    • Heart attack Paternal Grandfather    • Kidney disease Sister        Social History     Socioeconomic History   • Marital status: Single     Spouse name: Not on file   • Number of children: 2   • Years of education: Not on file   • Highest education level: Not on file   Occupational  History   • Occupation: ELECTRIAL WORK   Tobacco Use   • Smoking status: Former Smoker     Packs/day: 0.33     Years: 30.00     Pack years: 9.90     Types: Cigarettes     Last attempt to quit: 7/3/2017     Years since quittin.0   • Smokeless tobacco: Never Used   Substance and Sexual Activity   • Alcohol use: No     Frequency: Never     Comment: used to drink alot but now just ocasional beer since has seizure in 2017   • Drug use: No   • Sexual activity: Defer           Objective   Physical Exam   Constitutional: He appears well-developed and well-nourished. He appears lethargic. He is sleeping. He has a sickly appearance. He appears distressed.   HENT:   Head: Normocephalic.   Nose: Nose normal.   Mouth/Throat: Oropharynx is clear and moist.   Eyes: EOM are normal. Pupils are equal, round, and reactive to light. Right eye exhibits no discharge. Left eye exhibits no discharge. No scleral icterus.   Neck: Normal range of motion. Neck supple. No JVD present. No neck rigidity. No tracheal deviation present. No Brudzinski's sign and no Kernig's sign noted. No thyromegaly present.   Cardiovascular: Normal rate, regular rhythm, normal heart sounds and intact distal pulses. Exam reveals no friction rub.   No murmur heard.  Pulmonary/Chest: Effort normal and breath sounds normal. No stridor. No respiratory distress. He has no wheezes. He has no rales. He exhibits no tenderness.   Abdominal: Soft. Bowel sounds are normal. He exhibits no distension and no mass.   Musculoskeletal: Normal range of motion. He exhibits no edema or tenderness.   Neurological: He has normal reflexes. He appears lethargic. He is disoriented. He displays normal reflexes. No cranial nerve deficit or sensory deficit. He exhibits normal muscle tone. He displays no seizure activity. Coordination normal.   Skin: Skin is warm and dry. No rash noted. He is not diaphoretic. No erythema.   Psychiatric: He has a normal mood and affect.   Nursing note and  "vitals reviewed.      Procedures          ED Course  ED Course as of Jul 15 1821   Mon Jul 15, 2019   1339 Case was discussed at length with the family they know about the poor prognosis of the patient and the patient appears to be depokate toxic we will admit him to the hospital  [TS]      ED Course User Index  [TS] Ned Wilson MD                  [facility]      Final diagnoses:   Altered mental status, unspecified altered mental status type   Valproic acid toxicity, accidental or unintentional, initial encounter            Ned Wilson MD  07/15/19 1821      Electronically signed by Ned Wilson MD at 7/15/2019  6:21 PM       ICU Vital Signs     Row Name 07/16/19 0355 07/15/19 2357 07/15/19 2312 07/15/19 2048 07/15/19 1927       Vitals    Temp  98.1 °F (36.7 °C)  --  98.3 °F (36.8 °C)  --  98 °F (36.7 °C)    Temp src  Oral  --  Oral  --  Oral    Pulse  58  --  58  87  68    Heart Rate Source  Monitor  --  Monitor  Monitor  Monitor    Resp  18  --  18  --  16    Resp Rate Source  Visual  --  Visual  --  Visual    BP  144/90  --  143/88  --  130/92    BP Location  Left arm  --  Left arm  --  Left arm    BP Method  Automatic  --  Automatic  --  Automatic    Patient Position  Lying  --  Lying  --  Lying       Oxygen Therapy    SpO2  97 %  --  93 %  95 %  97 %    Pulse Oximetry Type  --  --  --  Intermittent  --    Device (Oxygen Therapy)  room air  -- Vapotherm on stby/ DC after 24 hrs  room air  room air  room air    Row Name 07/15/19 1616 07/15/19 1457 07/15/19 11:05:50 07/15/19 1015 07/15/19 0948       Height and Weight    Height  --  --  --  --  172.7 cm (68\")    Height Method  --  --  --  --  Stated    Weight  86.9 kg (191 lb 9.6 oz)  --  --  --  80.7 kg (178 lb)    Weight Method  Bed scale  --  --  --  Bed scale    Ideal Body Weight (IBW) (kg)  --  --  --  --  70.89    BSA (Calculated - sq m)  --  --  --  --  1.95 sq meters    BMI (Calculated)  --  --  --  --  27.1    Weight in (lb) to have BMI = 25  --  --  -- "  --  164.1       Vitals    Temp  97.8 °F (36.6 °C)  --  --  --  97.7 °F (36.5 °C)    Temp src  Axillary  --  --  --  Oral    Pulse  78  73  79  66  113    Heart Rate Source  Monitor  --  --  Monitor  --    Resp  16  --  12  10  18    Resp Rate Source  Monitor  --  --  Monitor  --    BP  137/103  (Abnormal)  RN and MD aware  133/99  --  --  139/91       Oxygen Therapy    SpO2  95 %  99 %  98 %  95 %  97 %    Pulse Oximetry Type  Intermittent  --  --  Continuous  --    Device (Oxygen Therapy)  room air  --  heated;high-flow nasal cannula  heated;humidified;high-flow nasal cannula  room air    $ High Flow Nasal Cannula Set-Up  --  --  --  yes Vapotherm # 6  --    Flow (L/min)  --  --  30  30  --    Oxygen Concentration (%)  --  --  50  50  --           Intake and Output (last 48 hours)      Intake/Output     Row Name 07/16/19 0611 07/16/19 0355 07/15/19 2206 07/15/19 2200 07/15/19 2135       lacosamide (VIMPAT) 150 mg in sodium chloride 0.9 % 50 mL IVPB     Start: 07/15/19 2100    Dose  --  --  --  --  *150 mg    Volume (mL)  --  --  --  --  50       levETIRAcetam (KEPPRA) 1,250 mg in sodium chloride 0.9 % 100 mL IVPB     Start: 07/15/19 2100    Dose  --  --  *1250 mg  --  --       Urine Output    Urine Unmeasured Occurrence  1  1  --  1  --    Urinary Incontinence  Yes  Yes  --  Yes  --    Urine Amount  Medium  Medium  --  Large  --       Stool Output    Stool Unmeasured Occurrence  --  --  --  1  --    Bowel Incontinence  --  --  --  Yes  --    Stool Amount  --  --  --  moderate  --    Row Name 07/15/19 2100 07/15/19 1813 07/15/19 1809 07/15/19 1616 07/15/19 1225       Weights    Weight  --  --  --  86.9 kg (191 lb 9.6 oz)  --    Weight Method  --  --  --  Bed scale  --       sodium chloride 0.9 % bolus 500 mL     Start: 07/15/19 1715    Dose  --  --  *500 mL  --  --    Volume (mL)  --  --  500  --  --       Magnesium Sulfate    Latest MgSO4 serum level  --  --  --  --  2.2       Urine Output    Urine Unmeasured  "Occurrence  1  1  --  --  --    Urinary Incontinence  Yes  Yes  --  --  --    Urine Amount  Medium  Large  --  --  --       Stool Output    Stool Unmeasured Occurrence  1  1  --  --  --    Bowel Incontinence  Yes  Yes  --  --  --    Stool Amount  moderate  large  --  --  --    Row Name 07/15/19 0948                   Weights    Weight  80.7 kg (178 lb)        Weight Method  Bed scale        Ideal Body Weight (IBW) (kg)  70.89        BSA (Calculated - sq m)  1.95 sq meters        BMI (Calculated)  27.1            Lines, Drains & Airways    Active LDAs     None         Inactive LDAs     None                CIWA (last 2 days)     None        Hospital Medications (all)       Dose Frequency Start End    acetaminophen (TYLENOL) tablet 650 mg 650 mg Every 4 Hours PRN 7/15/2019     Sig - Route: Take 2 tablets by mouth Every 4 (Four) Hours As Needed for Mild Pain . - Oral    dexamethasone (DECADRON) injection 10 mg 10 mg Once 7/15/2019 7/15/2019    Sig - Route: Infuse 1 mL into a venous catheter 1 (One) Time. - Intravenous    dexamethasone (DECADRON) injection 2 mg 2 mg Every 6 Hours 7/15/2019     Sig - Route: Infuse 0.5 mL into a venous catheter Every 6 (Six) Hours. - Intravenous    lacosamide (VIMPAT) 150 mg in sodium chloride 0.9 % 50 mL IVPB 150 mg Every 12 Hours Scheduled 7/15/2019     Sig - Route: Infuse 150 mg into a venous catheter Every 12 (Twelve) Hours. - Intravenous    Linked Group 1:  \"Or\" Linked Group Details        lacosamide (VIMPAT) tablet 150 mg 150 mg Every 12 Hours Scheduled 7/15/2019     Sig - Route: Take 3 tablets by mouth Every 12 (Twelve) Hours. - Oral    Linked Group 1:  \"Or\" Linked Group Details        lactulose enema 300 mL Once 7/15/2019 7/15/2019    Sig - Route: Insert 300 mL into the rectum 1 (One) Time. - Rectal    lactulose solution 20 g 20 g Daily PRN 7/15/2019     Sig - Route: Take 30 mL by mouth Daily As Needed (Daily BM). - Oral    levETIRAcetam (KEPPRA) 1,250 mg in sodium chloride 0.9 % " "100 mL IVPB 1,250 mg Every 12 Hours Scheduled 7/15/2019     Sig - Route: Infuse 1,250 mg into a venous catheter Every 12 (Twelve) Hours. - Intravenous    Linked Group 2:  \"Or\" Linked Group Details        levETIRAcetam (KEPPRA) tablet 1,250 mg 1,250 mg Every 12 Hours Scheduled 7/15/2019     Sig - Route: Take 1,250 mg by mouth Every 12 (Twelve) Hours. - Oral    Linked Group 2:  \"Or\" Linked Group Details        levOCARNitine (CARNITOR) 1 GM/10ML solution 500 mg 500 mg Every 12 Hours Scheduled 7/15/2019     Sig - Route: Take 5 mL by mouth Every 12 (Twelve) Hours. - Oral    LORazepam (ATIVAN) injection 1 mg 1 mg Every 4 Hours PRN 7/15/2019 7/25/2019    Sig - Route: Infuse 0.5 mL into a venous catheter Every 4 (Four) Hours As Needed for Anxiety or Seizures. - Intravenous    ondansetron (ZOFRAN) injection 4 mg 4 mg Every 6 Hours PRN 7/15/2019     Sig - Route: Infuse 2 mL into a venous catheter Every 6 (Six) Hours As Needed for Nausea or Vomiting. - Intravenous    Linked Group 3:  \"Or\" Linked Group Details        ondansetron (ZOFRAN) tablet 4 mg 4 mg Every 6 Hours PRN 7/15/2019     Sig - Route: Take 1 tablet by mouth Every 6 (Six) Hours As Needed for Nausea or Vomiting. - Oral    Linked Group 3:  \"Or\" Linked Group Details        pantoprazole (PROTONIX) EC tablet 40 mg 40 mg Every Early Morning 7/16/2019     Sig - Route: Take 1 tablet by mouth Every Morning. - Oral    Pharmacy Meds to Bed Consult  Daily 7/15/2019     Sig - Route: Daily. - Does not apply    pregabalin (LYRICA) capsule 75 mg 75 mg Every 12 Hours Scheduled 7/15/2019     Sig - Route: Take 1 capsule by mouth Every 12 (Twelve) Hours. - Oral    sodium chloride 0.9 % bolus 500 mL 500 mL Once 7/15/2019 7/15/2019    Sig - Route: Infuse 500 mL into a venous catheter 1 (One) Time. - Intravenous    sodium chloride 0.9 % flush 3 mL 3 mL Every 12 Hours Scheduled 7/15/2019     Sig - Route: Infuse 3 mL into a venous catheter Every 12 (Twelve) Hours. - Intravenous    sodium " chloride 0.9 % flush 3-10 mL 3-10 mL As Needed 7/15/2019     Sig - Route: Infuse 3-10 mL into a venous catheter As Needed for Line Care. - Intravenous    sulfamethoxazole-trimethoprim (BACTRIM DS,SEPTRA DS) 800-160 MG per tablet 160 mg 1 tablet 3 Times Weekly 7/15/2019 8/19/2019    Sig - Route: Take 1 tablet by mouth Once per day on Mon Wed Fri. - Oral    venlafaxine XR (EFFEXOR-XR) 24 hr capsule 150 mg 150 mg Daily 7/16/2019     Sig - Route: Take 2 capsules by mouth Daily. - Oral    vitamin B-6 (PYRIDOXINE) tablet 100 mg 100 mg Daily 7/16/2019     Sig - Route: Take 2 tablets by mouth Daily. - Oral    lacosamide (VIMPAT) tablet 150 mg (Discontinued) 150 mg Every 12 Hours Scheduled 7/15/2019 7/15/2019    Sig - Route: Take 3 tablets by mouth Every 12 (Twelve) Hours. - Oral    lactulose enema (Discontinued) 300 mL Once 7/15/2019 7/15/2019    Sig - Route: Insert 300 mL into the rectum 1 (One) Time. - Rectal    levETIRAcetam (KEPPRA) tablet 1,250 mg (Discontinued) 1,250 mg 2 Times Daily 7/15/2019 7/15/2019    Sig - Route: Take 1,250 mg by mouth 2 (Two) Times a Day. - Oral    levOCARNitine (CARNITOR) 1 GM/10ML solution 400 mg (Discontinued) 400 mg 3 Times Daily 7/15/2019 7/15/2019    Sig - Route: Take 4 mL by mouth 3 (Three) Times a Day. - Oral    levOCARNitine (CARNITOR) 1 GM/10ML solution 5,000 mg (Discontinued) 5,000 mg Once 7/15/2019 7/15/2019    Sig - Route: 50 mL by Per G Tube route 1 (One) Time. - Per G Tube    levOCARNitine (CARNITOR) 1,000 mg in sodium chloride 0.9 % 250 mL IVPB (Discontinued) 1,000 mg Once 7/15/2019 7/15/2019    Sig - Route: Infuse 1,000 mg into a venous catheter 1 (One) Time. - Intravenous    pregabalin (LYRICA) capsule 100 mg (Discontinued) 100 mg Every 12 Hours Scheduled 7/15/2019 7/15/2019    Sig - Route: Take 1 capsule by mouth Every 12 (Twelve) Hours. - Oral          Lab Results (last 48 hours)     Procedure Component Value Units Date/Time    Valproic Acid Level, Total [[id number]]   (Abnormal) Collected:  07/16/19 0510    Specimen:  Blood Updated:  07/16/19 0613     Valproic Acid 24.1 mcg/mL     Comprehensive Metabolic Panel [[id number]]  (Abnormal) Collected:  07/16/19 0510    Specimen:  Blood Updated:  07/16/19 0612     Glucose 74 mg/dL      BUN 40 mg/dL      Creatinine 1.14 mg/dL      Sodium 141 mmol/L      Potassium 4.9 mmol/L      Chloride 104 mmol/L      CO2 31.0 mmol/L      Calcium 9.2 mg/dL      Total Protein 5.6 g/dL      Albumin 2.90 g/dL      ALT (SGPT) 61 U/L      AST (SGOT) 34 U/L      Alkaline Phosphatase 53 U/L      Total Bilirubin 0.7 mg/dL      eGFR Non African Amer 67 mL/min/1.73      Globulin 2.7 gm/dL      A/G Ratio 1.1 g/dL      BUN/Creatinine Ratio 35.1     Anion Gap 6.0 mmol/L     Narrative:       GFR Normal >60  Chronic Kidney Disease <60  Kidney Failure <15    Ammonia [[id number]]  (Abnormal) Collected:  07/16/19 0510    Specimen:  Blood Updated:  07/16/19 0601     Ammonia <9 umol/L     CBC (No Diff) [[id number]]  (Abnormal) Collected:  07/16/19 0510    Specimen:  Blood Updated:  07/16/19 0553     WBC 9.92 10*3/mm3      RBC 4.72 10*6/mm3      Hemoglobin 13.7 g/dL      Hematocrit 42.2 %      MCV 89.4 fL      MCH 29.0 pg      MCHC 32.5 g/dL      RDW 16.3 %      RDW-SD 53.4 fl      MPV 11.8 fL      Platelets 112 10*3/mm3     Vitamin B12 [[id number]]  (Normal) Collected:  07/15/19 1103    Specimen:  Blood Updated:  07/15/19 1805     Vitamin B-12 845 pg/mL     Valproic Acid Level, Total [[id number]]  (Normal) Collected:  07/15/19 1742    Specimen:  Blood Updated:  07/15/19 1803     Valproic Acid 80.7 mcg/mL     Magnesium [[id number]]  (Normal) Collected:  07/15/19 1225    Specimen:  Blood Updated:  07/15/19 1720     Magnesium 2.2 mg/dL     Comprehensive Metabolic Panel [[id number]]  (Abnormal) Collected:  07/15/19 1225    Specimen:  Blood Updated:  07/15/19 1326     Glucose 71 mg/dL      BUN 37 mg/dL      Creatinine 1.10 mg/dL      Sodium 140 mmol/L      Potassium 4.7 mmol/L       Chloride 102 mmol/L      CO2 29.0 mmol/L      Calcium 8.9 mg/dL      Total Protein 5.9 g/dL      Albumin 3.00 g/dL      ALT (SGPT) 61 U/L      AST (SGOT) 46 U/L      Alkaline Phosphatase 62 U/L      Total Bilirubin 0.4 mg/dL      eGFR Non African Amer 70 mL/min/1.73      Globulin 2.9 gm/dL      A/G Ratio 1.0 g/dL      BUN/Creatinine Ratio 33.6     Anion Gap 9.0 mmol/L     Narrative:       GFR Normal >60  Chronic Kidney Disease <60  Kidney Failure <15    CBC & Differential [[id number]] Collected:  07/15/19 1225    Specimen:  Blood Updated:  07/15/19 1254    Narrative:       The following orders were created for panel order CBC & Differential.  Procedure                               Abnormality         Status                     ---------                               -----------         ------                     CBC Auto Differential[[id number]]        Abnormal            Final result                 Please view results for these tests on the individual orders.    CBC Auto Differential [[id number]]  (Abnormal) Collected:  07/15/19 1225    Specimen:  Blood Updated:  07/15/19 1254     WBC 8.81 10*3/mm3      RBC 4.96 10*6/mm3      Hemoglobin 14.5 g/dL      Hematocrit 45.1 %      MCV 90.9 fL      MCH 29.2 pg      MCHC 32.2 g/dL      RDW 17.0 %      RDW-SD 55.3 fl      MPV 11.1 fL      Platelets 124 10*3/mm3      Neutrophil % 81.0 %      Lymphocyte % 13.7 %      Monocyte % 3.5 %      Eosinophil % 0.0 %      Basophil % 0.2 %      Neutrophils, Absolute 7.13 10*3/mm3      Lymphocytes, Absolute 1.21 10*3/mm3      Monocytes, Absolute 0.31 10*3/mm3      Eosinophils, Absolute 0.00 10*3/mm3      Basophils, Absolute 0.02 10*3/mm3     Valproic Acid Level, Total [[id number]]  (Abnormal) Collected:  07/15/19 1103    Specimen:  Blood Updated:  07/15/19 1203     Valproic Acid 140.2 mcg/mL           Lab Results (last 48 hours)     Procedure Component Value Units Date/Time    Valproic Acid Level, Total [[id number]]  (Abnormal) Collected:   07/16/19 0510    Specimen:  Blood Updated:  07/16/19 0613     Valproic Acid 24.1 mcg/mL     Comprehensive Metabolic Panel [[id number]]  (Abnormal) Collected:  07/16/19 0510    Specimen:  Blood Updated:  07/16/19 0612     Glucose 74 mg/dL      BUN 40 mg/dL      Creatinine 1.14 mg/dL      Sodium 141 mmol/L      Potassium 4.9 mmol/L      Chloride 104 mmol/L      CO2 31.0 mmol/L      Calcium 9.2 mg/dL      Total Protein 5.6 g/dL      Albumin 2.90 g/dL      ALT (SGPT) 61 U/L      AST (SGOT) 34 U/L      Alkaline Phosphatase 53 U/L      Total Bilirubin 0.7 mg/dL      eGFR Non African Amer 67 mL/min/1.73      Globulin 2.7 gm/dL      A/G Ratio 1.1 g/dL      BUN/Creatinine Ratio 35.1     Anion Gap 6.0 mmol/L     Narrative:       GFR Normal >60  Chronic Kidney Disease <60  Kidney Failure <15    Ammonia [[id number]]  (Abnormal) Collected:  07/16/19 0510    Specimen:  Blood Updated:  07/16/19 0601     Ammonia <9 umol/L     CBC (No Diff) [[id number]]  (Abnormal) Collected:  07/16/19 0510    Specimen:  Blood Updated:  07/16/19 0553     WBC 9.92 10*3/mm3      RBC 4.72 10*6/mm3      Hemoglobin 13.7 g/dL      Hematocrit 42.2 %      MCV 89.4 fL      MCH 29.0 pg      MCHC 32.5 g/dL      RDW 16.3 %      RDW-SD 53.4 fl      MPV 11.8 fL      Platelets 112 10*3/mm3     Vitamin B12 [[id number]]  (Normal) Collected:  07/15/19 1103    Specimen:  Blood Updated:  07/15/19 1805     Vitamin B-12 845 pg/mL     Valproic Acid Level, Total [[id number]]  (Normal) Collected:  07/15/19 1742    Specimen:  Blood Updated:  07/15/19 1803     Valproic Acid 80.7 mcg/mL     Magnesium [[id number]]  (Normal) Collected:  07/15/19 1225    Specimen:  Blood Updated:  07/15/19 1720     Magnesium 2.2 mg/dL     Comprehensive Metabolic Panel [[id number]]  (Abnormal) Collected:  07/15/19 1225    Specimen:  Blood Updated:  07/15/19 1326     Glucose 71 mg/dL      BUN 37 mg/dL      Creatinine 1.10 mg/dL      Sodium 140 mmol/L      Potassium 4.7 mmol/L      Chloride 102 mmol/L       CO2 29.0 mmol/L      Calcium 8.9 mg/dL      Total Protein 5.9 g/dL      Albumin 3.00 g/dL      ALT (SGPT) 61 U/L      AST (SGOT) 46 U/L      Alkaline Phosphatase 62 U/L      Total Bilirubin 0.4 mg/dL      eGFR Non African Amer 70 mL/min/1.73      Globulin 2.9 gm/dL      A/G Ratio 1.0 g/dL      BUN/Creatinine Ratio 33.6     Anion Gap 9.0 mmol/L     Narrative:       GFR Normal >60  Chronic Kidney Disease <60  Kidney Failure <15    CBC & Differential [[id number]] Collected:  07/15/19 1225    Specimen:  Blood Updated:  07/15/19 1254    Narrative:       The following orders were created for panel order CBC & Differential.  Procedure                               Abnormality         Status                     ---------                               -----------         ------                     CBC Auto Differential[[id number]]        Abnormal            Final result                 Please view results for these tests on the individual orders.    CBC Auto Differential [[id number]]  (Abnormal) Collected:  07/15/19 1225    Specimen:  Blood Updated:  07/15/19 1254     WBC 8.81 10*3/mm3      RBC 4.96 10*6/mm3      Hemoglobin 14.5 g/dL      Hematocrit 45.1 %      MCV 90.9 fL      MCH 29.2 pg      MCHC 32.2 g/dL      RDW 17.0 %      RDW-SD 55.3 fl      MPV 11.1 fL      Platelets 124 10*3/mm3      Neutrophil % 81.0 %      Lymphocyte % 13.7 %      Monocyte % 3.5 %      Eosinophil % 0.0 %      Basophil % 0.2 %      Neutrophils, Absolute 7.13 10*3/mm3      Lymphocytes, Absolute 1.21 10*3/mm3      Monocytes, Absolute 0.31 10*3/mm3      Eosinophils, Absolute 0.00 10*3/mm3      Basophils, Absolute 0.02 10*3/mm3     Valproic Acid Level, Total [[id number]]  (Abnormal) Collected:  07/15/19 1103    Specimen:  Blood Updated:  07/15/19 1203     Valproic Acid 140.2 mcg/mL           Orders (last 48 hrs)     Start     Ordered    07/16/19 0900  venlafaxine XR (EFFEXOR-XR) 24 hr capsule 150 mg  Daily      07/15/19 1634    07/16/19 0900  vitamin B-6  (PYRIDOXINE) tablet 100 mg  Daily      07/15/19 1634    07/16/19 0600  pantoprazole (PROTONIX) EC tablet 40 mg  Every Early Morning      07/15/19 1634    07/16/19 0600  Ammonia  Morning Draw      07/15/19 1652    07/16/19 0600  CBC (No Diff)  Morning Draw      07/15/19 1720    07/16/19 0600  Comprehensive Metabolic Panel  Morning Draw      07/15/19 1720    07/16/19 0000  Obtain Medical Records  Until Discontinued     Comments:  Obtain labs results done on 7/11/19 at [facility] in [place]   Clinic is open 8 to 5 Monday through Friday  Phone is [phone]  Should include ammonia levels and valproate levels.    07/15/19 1734    07/15/19 2203  Case Management  Consult  Once     Provider:  (Not yet assigned)    07/15/19 2204    07/15/19 2202  PT Consult: Eval & Treat As Tolerated; debility, cancer  Once      07/15/19 2204    07/15/19 2202  OT Consult: Eval & Treat debility, cancer  Once      07/15/19 2204    07/15/19 2100  sodium chloride 0.9 % flush 3 mL  Every 12 Hours Scheduled      07/15/19 1618    07/15/19 2100  lacosamide (VIMPAT) tablet 150 mg  Every 12 Hours Scheduled,   Status:  Discontinued      07/15/19 1634    07/15/19 2100  levETIRAcetam (KEPPRA) tablet 1,250 mg  2 Times Daily,   Status:  Discontinued      07/15/19 1634    07/15/19 2100  pregabalin (LYRICA) capsule 100 mg  Every 12 Hours Scheduled,   Status:  Discontinued      07/15/19 1634    07/15/19 2100  pregabalin (LYRICA) capsule 75 mg  Every 12 Hours Scheduled      07/15/19 1734    07/15/19 2100  lacosamide (VIMPAT) 150 mg in sodium chloride 0.9 % 50 mL IVPB  Every 12 Hours Scheduled      07/15/19 1801    07/15/19 2100  lacosamide (VIMPAT) tablet 150 mg  Every 12 Hours Scheduled      07/15/19 1801    07/15/19 2100  levETIRAcetam (KEPPRA) 1,250 mg in sodium chloride 0.9 % 100 mL IVPB  Every 12 Hours Scheduled      07/15/19 1806    07/15/19 2100  levETIRAcetam (KEPPRA) tablet 1,250 mg  Every 12 Hours Scheduled      07/15/19 1806     07/15/19 2015  Pharmacy Meds to Bed Consult  Daily      07/15/19 1929    07/15/19 2000  Vital Signs  Every 4 Hours      07/15/19 1618    07/15/19 2000  Neuro Checks  Every 4 Hours      07/15/19 1618    07/15/19 1835  NIPPV (CPAP or BIPAP)  At Bedtime & As Needed-RT     Comments:  Respiratory to place settings- home unit not available patient confused and unable to give settings    07/15/19 1834    07/15/19 1834  Oxygen Therapy- Nasal Cannula; Titrate for SPO2: 90% - 95%  Continuous      07/15/19 1834    07/15/19 1730  sulfamethoxazole-trimethoprim (BACTRIM DS,SEPTRA DS) 800-160 MG per tablet 160 mg  Once per day on Mon Wed Fri      07/15/19 1634    07/15/19 1720  Diet Regular  Diet Effective Now      07/15/19 1719    07/15/19 1715  dexamethasone (DECADRON) injection 2 mg  Every 6 Hours      07/15/19 1618    07/15/19 1715  sodium chloride 0.9 % bolus 500 mL  Once      07/15/19 1618    07/15/19 1703  Valproic Acid Level, Total  Daily      07/15/19 1702    07/15/19 1701  Vitamin B12  Once      07/15/19 1700    07/15/19 1700  levOCARNitine (CARNITOR) 1 GM/10ML solution 5,000 mg  Once,   Status:  Discontinued      07/15/19 1532    07/15/19 1700  levOCARNitine (CARNITOR) 1 GM/10ML solution 500 mg  Every 12 Hours Scheduled      07/15/19 1558    07/15/19 1657  Urine Drug Screen - Urine, Clean Catch  Once      07/15/19 1656    07/15/19 1654  Magnesium  Once      07/15/19 1653    07/15/19 1638  lactulose solution 20 g  Daily PRN      07/15/19 1634    07/15/19 1632  Seizure Precautions  Continuous      07/15/19 1631    07/15/19 1631  LORazepam (ATIVAN) injection 1 mg  Every 4 Hours PRN      07/15/19 1631    07/15/19 1630  SLP Consult: Eval & Treat Swallow Disorder; Regular - No Dietary Restrictions  Once      07/15/19 1629    07/15/19 1619  Inpatient Neurology Consult General  Once     Specialty:  Neurology  Provider:  Doreen Zuniga MD    07/15/19 1618    07/15/19 1619  Inpatient Oncology Consult  Once     Specialty:   Oncology  Provider:  Luis Miguel Doyle MD    07/15/19 1618    07/15/19 1619  Intake & Output  Every Shift      07/15/19 1618    07/15/19 1619  Weigh Patient  Once      07/15/19 1618    07/15/19 1619  Oxygen Therapy- Nasal Cannula; Titrate for SPO2: 90% - 95%  Continuous,   Status:  Canceled      07/15/19 1618    07/15/19 1619  Insert Peripheral IV  Once      07/15/19 1618    07/15/19 1619  Saline Lock & Maintain IV Access  Continuous      07/15/19 1618    07/15/19 1619  Place Sequential Compression Device  Once      07/15/19 1618    07/15/19 1619  Maintain Sequential Compression Device  Continuous      07/15/19 1618    07/15/19 1619  NPO Diet  Diet Effective Now,   Status:  Canceled      07/15/19 1618    07/15/19 1618  sodium chloride 0.9 % flush 3-10 mL  As Needed      07/15/19 1618    07/15/19 1618  acetaminophen (TYLENOL) tablet 650 mg  Every 4 Hours PRN      07/15/19 1618    07/15/19 1618  ondansetron (ZOFRAN) tablet 4 mg  Every 6 Hours PRN      07/15/19 1618    07/15/19 1618  ondansetron (ZOFRAN) injection 4 mg  Every 6 Hours PRN      07/15/19 1618    07/15/19 1600  levOCARNitine (CARNITOR) 1,000 mg in sodium chloride 0.9 % 250 mL IVPB  Once,   Status:  Discontinued      07/15/19 1503    07/15/19 1600  levOCARNitine (CARNITOR) 1 GM/10ML solution 400 mg  3 Times Daily,   Status:  Discontinued      07/15/19 1558    07/15/19 1443  lactulose enema  Once,   Status:  Discontinued      07/15/19 1441    07/15/19 1438  lactulose enema  Once      07/15/19 1436    07/15/19 1432  Code Status and Medical Interventions:  Continuous      07/15/19 1432    07/15/19 1415  Inpatient Admission  Once      07/15/19 1415    07/15/19 1231  Manual Differential  Once,   Status:  Canceled      07/15/19 1230    07/15/19 1025  Oxygen Therapy- High Flow Nasal Cannula; Titrate for SPO2: 90% - 95%  Continuous,   Status:  Canceled     Comments:  Vapotherm per RT    07/15/19 1024    07/15/19 1017  dexamethasone (DECADRON) injection 10 mg   Once      07/15/19 1015    07/15/19 1016  CT Head Without Contrast  1 Time Imaging      07/15/19 1015    07/15/19 1015  CBC & Differential  Once      07/15/19 1015    07/15/19 1015  Comprehensive Metabolic Panel  Once      07/15/19 1015    07/15/19 1015  CBC Auto Differential  PROCEDURE ONCE      07/15/19 1015    07/15/19 1011  Valproic Acid Level, Total  Once      07/15/19 1015        Physician Progress Notes (last 72 hours) (Notes from 7/13/2019  7:15 AM through 7/16/2019  7:15 AM)     No notes of this type exist for this encounter.           Consult Notes (last 72 hours) (Notes from 7/13/2019  7:15 AM through 7/16/2019  7:15 AM)      Timmy Pratt PA at 7/16/2019  6:43 AM      Consult Orders    1. Inpatient Oncology Consult [[id number]] ordered by Jairon Webb MD at 07/15/19 1432                    MEDICAL ONCOLOGY CONSULTATION    Pt Name: Lukasz Savage  MRN: [id number]  [id number]  YOB: 1965  Date of evaluation: 7/16/2019    Reason for Consultation:  Continuity of care    Requesting Physician:  Hospitalist    History Obtained From:  FAMILY and CHART    HISTORY OF PRESENT ILLNESS:    Mr. 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  He is completed 20 of 30 planned XRT treatments and was brought to radiation therapy yesterday in anticipation of dose #21 but reported that he had been more lethargic for 2 to 3 days.  He was taken to the emergency room for evaluation.  His mother reports that he has had no significant, obvious seizure activity but has had some trembling of the right upper extremity at times.    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 Doyle on 5/24/2019 referred by Dr. Trevino for diagnosis of primary brain tumor, grade 3 astrocytoma. Lukasz was being seen by neurology with complaints of left facial and upper extremity.  · 4/2/2019-MRI brain with contrast showed changes in the frontal convexity with a fullness of the sulcal gyral pattern. Specifically, 4.5 x 4.5 x 3.5 cm right frontal lesion. Second, discrete hyperintense nodule measuring 1.1 x 1.9 x 1.5 cm in the subcortical white matter of the paramedian posterior right frontal lobe immediately above the corpus callosum. This was concerning for high-grade glioma.   · 4/15/2019-he underwent a craniotomy with stereotactic biopsy by Dr. Dillon Trevino at [facility]. 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 Further molecular analysis at [facility] revealed IDH1/2 wild type, MGMT non-methylated, TERT mutation. Complex cytogenetics changes A maximum safe resection was not possible due to concerns of significant sequela. Second opinion was recommended at the [facility].   · 5/17/2019-he was seen by Dr. Oral Jessica. Recommended concurrent chemoradiation.   · 5/24/2019-initial oncology consultation, Dr. Doyle recommended concurrent chemoradiation with Temodar.   · 6/3/2019. CT scan of the head without contrast documented to ill-defined masslike areas of increased attenuation within the right frontal lobe. Largest measuring 3.3 cm, previously measuring 1.8 cm and second lesion in the lateral aspect of the right frontal lobe measuring up to 1.5 cm. No intracranial hemorrhage or midline shift.   · 6/3/2019- MRI of the brain with and without contrast, compared to 4/2/2019 documented masslike appearance at the right frontal lobe measuring 2.9 x 1.4 cm, previously measuring 1.7 x 1.1 cm with mass effect and possible extension into the corpus callosum. No midline shift.    · 6/14/2019-CT scan cervical spine without contrast documented no evidence of acute bony injury. Multilevel disc degeneration spondylosis.   · 6/12/2019- Initiated Temodar along with radiation therapy   · 6/17/2019- MRI of the brain with and without contrast documented 3 cm enhancing, partially necrotic tumor in the both the right corpus callosum body, consistent with known astrocytoma. Additional FLAIR signal on both sides of the right central sulcus with faint contrast enhancement in the precentral gyrus, compatible with tumor extension. No hemorrhage or brain herniation.   · 6/21/2019 - CT scan of the head without contrast hypodense focus of the right frontal lobe measuring 3.3 cm. No intracranial hemorrhage. No abnormal extra-axial fluid collection. Right frontal craniotomy changes.        Past Medical History:    Past Medical History:   Diagnosis Date   • Anemia 6/17/2019   • Angio-edema 7/3/2017   • Anxiety    • Arthritis    • Cancer (CMS/HCC) 05/09/2019    Brain cancer diagnoised yesterday  Dr Trevino    • Clostridium difficile colitis    • Erectile dysfunction 10/6/2016   • GERD (gastroesophageal reflux disease)    • Gout    • HCAP (healthcare-associated pneumonia) 7/11/2017   • Hyperlipidemia    • Malignant hypertension    • MSSA (methicillin susceptible Staphylococcus aureus) infection 7/31/2017   • Obstructive sleep apnea    • S/P shoulder surgery 7/27/2018   • Seizures (CMS/HCC) 7/4/2017       Past Surgical History:    Past Surgical History:   Procedure Laterality Date   • COLONOSCOPY     • CRANIOTOMY Right 4/15/2019    Procedure: CRANIOTOMY WITH BIOPSY RIGHT;  Surgeon: Dillon Trevino MD;  Location: [facility] OR;  Service: Neurosurgery   • SHOULDER ARTHROSCOPY Left    • TRACHEOSTOMY N/A 7/12/2017    Procedure: TRACHEOSTOMY WITH TRACHEOSCOPY;  Surgeon: Jairon Pierson MD;  Location: [facility] OR;  Service:        Immunizations:    Immunization History   Administered Date(s) Administered   • Flu Vaccine Quad  PF >36MO 11/20/2017   • flucelvax quad pfs =>4 YRS 11/20/2018       Current Hospital Medications:      Current Facility-Administered Medications:   •  acetaminophen (TYLENOL) tablet 650 mg, 650 mg, Oral, Q4H PRN, Jairon Webb MD  •  dexamethasone (DECADRON) injection 2 mg, 2 mg, Intravenous, Q6H, Jairon Webb MD, 2 mg at 07/16/19 0556  •  lacosamide (VIMPAT) 150 mg in sodium chloride 0.9 % 50 mL IVPB, 150 mg, Intravenous, Q12H, Last Rate: 100 mL/hr at 07/15/19 2135, 150 mg at 07/15/19 2135 **OR** lacosamide (VIMPAT) tablet 150 mg, 150 mg, Oral, Q12H, Doreen Zuniga MD  •  lactulose solution 20 g, 20 g, Oral, Daily PRN, Jairon Webb MD  •  levETIRAcetam (KEPPRA) 1,250 mg in sodium chloride 0.9 % 100 mL IVPB, 1,250 mg, Intravenous, Q12H, 1,250 mg at 07/15/19 2206 **OR** levETIRAcetam (KEPPRA) tablet 1,250 mg, 1,250 mg, Oral, Q12H, Doreen Zuniga MD  •  levOCARNitine (CARNITOR) 1 GM/10ML solution 500 mg, 500 mg, Oral, Q12H, Jairon Webb MD  •  LORazepam (ATIVAN) injection 1 mg, 1 mg, Intravenous, Q4H PRN, Jairon Webb MD  •  ondansetron (ZOFRAN) tablet 4 mg, 4 mg, Oral, Q6H PRN **OR** ondansetron (ZOFRAN) injection 4 mg, 4 mg, Intravenous, Q6H PRN, Jairon Webb MD  •  pantoprazole (PROTONIX) EC tablet 40 mg, 40 mg, Oral, Q AM, Jairon Webb MD  •  Pharmacy Meds to Bed Consult, , Does not apply, Daily, Jairon Webb MD, Stopped at 07/16/19 0118  •  pregabalin (LYRICA) capsule 75 mg, 75 mg, Oral, Q12H, Doreen Zuniga MD  •  sodium chloride 0.9 % flush 3 mL, 3 mL, Intravenous, Q12H, Jairon Webb MD, 3 mL at 07/15/19 2135  •  sodium chloride 0.9 % flush 3-10 mL, 3-10 mL, Intravenous, PRN, Jairon Webb MD  •  sulfamethoxazole-trimethoprim (BACTRIM DS,SEPTRA DS) 800-160 MG per tablet 160 mg, 1 tablet, Oral, Once per day on Mon Wed Fri, Jairon Webb MD  •  venlafaxine XR (EFFEXOR-XR) 24 hr capsule 150 mg, 150 mg, Oral, Daily,  Jairon Webb MD  •  vitamin B-6 (PYRIDOXINE) tablet 100 mg, 100 mg, Oral, Daily, Jairon Webb MD    Allergies:   Allergies   Allergen Reactions   • Lipitor [Atorvastatin] Rash   • Lisinopril Angioedema     Swell tongue       Social History:    Social History     Socioeconomic History   • Marital status: Single     Spouse name: Not on file   • Number of children: 2   • Years of education: Not on file   • Highest education level: Not on file   Occupational History   • Occupation: ELECTRIAL WORK   Tobacco Use   • Smoking status: Former Smoker     Packs/day: 0.33     Years: 30.00     Pack years: 9.90     Types: Cigarettes     Last attempt to quit: 7/3/2017     Years since quittin.0   • Smokeless tobacco: Never Used   Substance and Sexual Activity   • Alcohol use: No     Frequency: Never     Comment: used to drink alot but now just ocasional beer since has seizure in 2017   • Drug use: No   • Sexual activity: Defer       Family History:   Family History   Problem Relation Age of Onset   • Hypertension Mother    • Diabetes Mother    • Heart disease Father    • Hypertension Father    • No Known Problems Daughter    • No Known Problems Son    • Diabetes Maternal Grandmother    • No Known Problems Maternal Grandfather    • No Known Problems Paternal Grandmother    • Heart attack Paternal Grandfather    • Kidney disease Sister        REVIEW OF SYSTEMS  CONSTITUTIONAL: no fever; he was less interactive but he is starting to interact better now.  HEENT: no epistaxis  LUNGS: no cough, no hemoptysis, no wheeze, no shortness of breath;  CARDIOVASCULAR: no palpitation, no chest pain, no shortness of breath;  GI: no abdominal pain, no nausea, no vomiting, no diarrhea, no constipation;  DIPAK: no dysuria, no hematuria, no frequency or urgency, no nephrolithiasis;  MUSCULOSKELETAL: no joint pain, no swelling, no stiffness;  ENDOCRINE: no polyuria, no polydipsia, no cold or heat intolerance;  HEMATOLOGY: no easy  "bruising or bleeding, no history of clotting disorder;  DERMATOLOGY: no skin rash, no eczema, no pruritus;  PSYCHIATRY: no suicidal ideation, no homicidal ideation;  NEUROLOGY: Lethargy has improved.  He continues with left-sided weakness.    Vitals:    /90 (BP Location: Left arm, Patient Position: Lying)   Pulse 58   Temp 98.1 °F (36.7 °C) (Oral)   Resp 18   Ht 172.7 cm (68\")   Wt 86.9 kg (191 lb 9.6 oz)   SpO2 97%   BMI 29.13 kg/m²      PHYSICAL EXAM:   CONSTITUTIONAL: NAD, interactive this morning, talkative  EYES: Non icteric  ENT: Mucus membranes dry  NECK: Supple, no masses.  No palpable thyroid mass  CHEST/LUNGS: CTA bilaterally, normal respiratory effort   CARDIOVASCULAR: RRR, no murmurs.  No lower extremity edema  ABDOMEN: soft non-tender, active bowel sounds, no HSM.  No palpable masses  EXTREMITIES: No edema  SKIN: warm, dry with no rashes or lesions  LYMPH: No cervical, clavicular, axillary, or inguinal lymphadenopathy  NEUROLOGIC: Chronic left-sided weakness, interactive, answering questions.  PSYCH: mood and affect appropriate.    CBC  Results from last 7 days   Lab Units 07/16/19  0510 07/15/19  1225   WBC 10*3/mm3 9.92 8.81   HEMOGLOBIN g/dL 13.7* 14.5   HEMATOCRIT % 42.2 45.1   PLATELETS 10*3/mm3 112* 124*         Lab Results   Component Value Date     07/16/2019    K 4.9 07/16/2019     07/16/2019    CO2 31.0 07/16/2019    BUN 40 (H) 07/16/2019    CREATININE 1.14 07/16/2019    GLUCOSE 74 07/16/2019    CALCIUM 9.2 07/16/2019    BILITOT 0.7 07/16/2019    ALKPHOS 53 07/16/2019    AST 34 07/16/2019    ALT 61 (H) 07/16/2019    AGRATIO 1.1 07/16/2019    GLOB 2.7 07/16/2019       Lab Results   Component Value Date    INR 0.94 06/21/2019    INR 0.98 06/03/2019    INR 0.94 03/22/2018    PROTIME 12.8 06/21/2019    PROTIME 13.3 06/03/2019    PROTIME 12.8 03/22/2018       Cultures:    Lab Results   Component Value Date    BLOODCX No growth at 5 days 06/15/2019     No components found " for: MARIBELINCX     CT HEAD WO CONTRAST- 7/15/2019 10:55 AM CDT     HISTORY: Fatigue     COMPARISON: CT head without contrast 06/29/2019     DOSE: 672 mGy-cm     TECHNIQUE: Sequential imaging was performed from the vertex through the  base of the skull without the use of IV contrast.  Sagittal and coronal  reformations were made from the original source data and reviewed.  Automated exposure control was also utilized to decrease patient  radiation dose.     FINDINGS:   There is redemonstration of a hyperdense lesion in the parafalcine left  frontal lobe which measures up to 3.2 cm in size, stable compared to the  previous exam. There is a right craniotomy defect with hyperdense  material noted in the brain parenchyma just deep to this, also stable  from the prior exam. There is no evidence of new intracranial  hemorrhage. There is no evidence of acute territorial infarct.  Ventricles appear normal in configuration. The basilar cisterns are  patent. Posterior fossa appears grossly normal. Paranasal sinuses and  mastoid air cells appear clear. Of        IMPRESSION:  Stable appearance of the brain compared to the exam from  06/29/2019, with hyperdense masses in the right frontal lobe without  associated midline shift. Right craniotomy defect is noted with a  hyperdense area just deep to it, also stable from the previous exam.  This may represent an additional mass. No acute intracranial findings  are appreciated.     This report was finalized on 07/15/2019 10:58 by Dr. Driss Ward MD.    ASSESSMENT/PLAN:    Anaplastic grade 3 glioma    XRT to the brain -completed 20 of 30 planned thus far.  Temodar 150 mg daily M-F with XRT - will resume if XRT resumed during hospitalization.    CT head stable    WBC 9.92  Hgb 13.7  ,000    Progressive lethargy    Valproic acid level 140.2 on admission 7/15/2019  -  24.1 this am    He is more interactive today.    Antiseizure medication  Keppra 1250 mg IV every 12  hours          YEVGENIY Hammond    07/16/19  6:43 AM      Electronically signed by Timmy Pratt PA at 7/16/2019  7:13 AM     Doreen Zuniga MD at 7/15/2019  4:48 PM      Consult Orders    1. Inpatient Neurology Consult General [[id number]] ordered by Jairon Webb MD at 07/15/19 1432                    Neurology Consult Note    Patient:  Lukasz Savage   YOB: 1965  MRN:  [id number]  Date of Admission:  7/15/2019  9:46 AM    Date: 7/15/2019    Referring Provider: Jairon Webb MD  Reason for Consultation: brain tumor, ? VPA Toxicity per ER      History of present illness:     This is a 54 y.o. 54 y.o. right handed male with H/O HTN, gout, hyperlipidemia, sleep apnea, and with H/O Grade 3 Anaplastic Astrocytoma, not amendable to resection and H/O seizures  evaluated  for VPA toxicity and decreased level of alertness    For his seizures, he is on Depakote, Vimpat and Keppra. His Valproate level was 140.2 today.  When he was at [facility] he had been placed on Lyrica 150 mg BId and did not tolerated that as a starting dose and was very groggy and began hallucinating.  In his last hospitalization he was in the process of being weaned off but he remains on 100 mg BID.   His home meds also include multiple other sedating meds including percocet, Seroquel, and Xanax. However mother states he takes the percocet as needed and only took 1 in past week (last Thursday)  He had fallen and fractured his ribs and then fell 2 more times so only takes it when he notes significant pain.  His mother will give it to him. She also states Xanax is as needed and that he does not get it more than a couple times a week.     His last ammonia levels have always been normal while on Depakote. AT time of last admission, while he was on the current home dose of 1500 mg TID his ammonia was only 32  but hs Valproate levels then were only 85.1 which was therapeutic.      Ref. Range 6/30/2019 06:34 6/30/2019  23:11 7/2/2019 04:55 7/2/2019 10:57 7/3/2019 05:41   Ammonia : 9 - 33 umol/L 37 (H) 154 (H) <9 (L) 24 32      Ref. Range 6/21/2019 19:32 6/23/2019 02:33 6/29/2019 09:48 7/3/2019 05:41 7/15/2019 11:03   Valproic Acid 50.0 - 100.0 mcg/mL 102.0 (H) 104.4 (C) 93.3 85.1 140.2 (H)       Patient is always very difficult to wake up in the morning. This has steadily worsened since his last discharge.  He wakes up later in the day.  This morning she could not wake him enough to get him bathed or to go to the bathroom. He had appointment in radiation therapy and between the patient's daughter and the mother they finally got him there but because of his sedation radiation was not done.     Patient has obstructive sleep apnea and something is not right with his CPAP machine.  He has not been using it.  He had an appointment with EPI Paula in sleep clinic but wound up in the hospital on the day of his appointment. Mother is not certain where his machine is but the location is over 45 minutes away.     Seizures have been under good control but on occasion they will not see a right hand twitch.  In the past the seizures have involved the left face, neck and arm.     Past Medical History:   Diagnosis Date   • Anemia 6/17/2019   • Angio-edema 7/3/2017   • Anxiety    • Arthritis    • Cancer (CMS/HCC) 05/09/2019    Brain cancer diagnoised yesterday  Dr Trevino    • Clostridium difficile colitis    • Erectile dysfunction 10/6/2016   • GERD (gastroesophageal reflux disease)    • Gout    • HCAP (healthcare-associated pneumonia) 7/11/2017   • Hyperlipidemia    • Malignant hypertension    • MSSA (methicillin susceptible Staphylococcus aureus) infection 7/31/2017   • Obstructive sleep apnea    • S/P shoulder surgery 7/27/2018   • Seizures (CMS/HCC) 7/4/2017       Past Surgical History:   Procedure Laterality Date   • COLONOSCOPY     • CRANIOTOMY Right 4/15/2019    Procedure: CRANIOTOMY WITH BIOPSY RIGHT;  Surgeon: Dillon Trevino  MD JEB;  Location:  [facility] OR;  Service: Neurosurgery   • SHOULDER ARTHROSCOPY Left    • TRACHEOSTOMY N/A 7/12/2017    Procedure: TRACHEOSTOMY WITH TRACHEOSCOPY;  Surgeon: Jairon Pierson MD;  Location: [facility] OR;  Service:        Prior to Admission medications    Medication Sig Start Date End Date Taking? Authorizing Provider   allopurinol (ZYLOPRIM) 300 MG tablet Take 300 mg by mouth Daily.    Provider, MD Phil   ALPRAZolam (XANAX) 0.25 MG tablet Take 1 tablet by mouth 2 (Two) Times a Day As Needed for Anxiety. 5/20/19   Linda Hoffman DNP, APRN   aspirin 81 MG tablet Take 1 tablet by mouth Daily. 2/28/19   Royal Todd PA   dexamethasone (DECADRON) 4 MG tablet Take 1 tablet by mouth 3 (Three) Times a Day With Meals. 6/25/19   Oral Jessica III, MD   divalproex (DEPAKOTE) 500 MG DR tablet Take 3 tablets by mouth Every 8 (Eight) Hours. 6/20/19   Jairon Webb MD   fenofibrate (TRICOR) 145 MG tablet Take 1 tablet by mouth Daily. 5/20/19   Linda Hoffman DNP, APRN   lacosamide (VIMPAT) 50 MG tablet tablet Take 3 tablets by mouth Every 12 (Twelve) Hours for 30 days. 6/20/19 7/20/19  Jairon Webb MD   lactulose 20 GM/30ML solution solution Take 30 mL by mouth Daily.  Patient taking differently: Take 20 g by mouth As Needed. 7/3/19   Jairon Perez DO   levETIRAcetam (KEPPRA) 500 MG tablet Take 2.5 tablets by mouth 2 (Two) Times a Day. 6/20/19   Jairon Webb MD   losartan (COZAAR) 25 MG tablet Take 1 tablet by mouth Daily. 7/11/19   Malia Vargas MD   Omega-3 Fatty Acids (FISH OIL) 1000 MG capsule capsule Take 2 capsules by mouth Daily With Breakfast. 5/20/19   Linda Hoffman DNP, APRN   omeprazole (PRILOSEC) 10 MG capsule Take 1 capsule by mouth Daily. For acid reflux 5/20/19   Linda Hoffman DNP, APRN   oxyCODONE-acetaminophen (PERCOCET) 7.5-325 MG per tablet Take 1 tablet by mouth Every 4 (Four) Hours As Needed for Moderate Pain  or Severe Pain .  6/25/19   Oral Jessica III, MD   pregabalin (LYRICA) 100 MG capsule Take 1 capsule by mouth Every 12 (Twelve) Hours. 7/3/19   Jairon Perez DO   promethazine (PHENERGAN) 12.5 MG tablet Take 12.5 mg by mouth Every 4 (Four) Hours As Needed for Nausea or Vomiting.    ProviderPhil MD   QUEtiapine (SEROquel) 50 MG tablet Take 1 tablet by mouth Every Night. 6/20/19   Jairon Webb MD   sulfamethoxazole-trimethoprim (BACTRIM DS,SEPTRA DS) 800-160 MG per tablet Take 1 tablet by mouth 3 (Three) Times a Week. Monday, Wednesday, and Friday.    ProviderPhil MD   TEMOZOLOMIDE PO Take 150 mg by mouth Daily. 140 mg + 10 mg daily.    Phil Constantino MD   venlafaxine XR (EFFEXOR-XR) 150 MG 24 hr capsule Take 1 capsule by mouth Daily. 5/20/19   Linda Hoffman, DNP, APRN   vitamin B-6 (PYRIDOXINE) 100 MG tablet TAKE 1 TABLET BY MOUTH DAILY. 7/10/19   Royal Todd PA       Hospital scheduled medications:     dexamethasone 2 mg Intravenous Q6H   lacosamide 150 mg Oral Q12H   lactulose enema 300 mL Rectal Once   levETIRAcetam 1,250 mg Oral BID   levOCARNitine 500 mg Oral Q12H   [START ON 7/16/2019] pantoprazole 40 mg Oral Q AM   pregabalin 100 mg Oral Q12H   sodium chloride 500 mL Intravenous Once   sodium chloride 3 mL Intravenous Q12H   sulfamethoxazole-trimethoprim 1 tablet Oral Once per day on Mon Wed Fri   [START ON 7/16/2019] venlafaxine  mg Oral Daily   [START ON 7/16/2019] vitamin B-6 100 mg Oral Daily     Hospital PRN medications:  •  acetaminophen  •  lactulose  •  LORazepam  •  ondansetron **OR** ondansetron  •  sodium chloride    Allergies   Allergen Reactions   • Lipitor [Atorvastatin] Rash   • Lisinopril Angioedema     Swell tongue       Social History     Socioeconomic History   • Marital status: Single     Spouse name: Not on file   • Number of children: 2   • Years of education: Not on file   • Highest education level: Not on file   Occupational History   •  Occupation: ELECTRIAL WORK   Tobacco Use   • Smoking status: Former Smoker     Packs/day: 0.33     Years: 30.00     Pack years: 9.90     Types: Cigarettes     Last attempt to quit: 7/3/2017     Years since quittin.0   • Smokeless tobacco: Never Used   Substance and Sexual Activity   • Alcohol use: No     Frequency: Never     Comment: used to drink alot but now just ocasional beer since has seizure in 2017   • Drug use: No   • Sexual activity: Defer     Family History   Problem Relation Age of Onset   • Hypertension Mother    • Diabetes Mother    • Heart disease Father    • Hypertension Father    • No Known Problems Daughter    • No Known Problems Son    • Diabetes Maternal Grandmother    • No Known Problems Maternal Grandfather    • No Known Problems Paternal Grandmother    • Heart attack Paternal Grandfather    • Kidney disease Sister        Review of Systems  A 14 point review of systems was reviewed and was negative except for hallucination, increased somnolence and right arm twitches    Vital Signs   Temp:  [97.7 °F (36.5 °C)-97.8 °F (36.6 °C)] 97.8 °F (36.6 °C)  Heart Rate:  [] 78  Resp:  [10-18] 16  BP: (133-139)/() 137/103    General Exam:  Head:  Normal cephalic, atraumatic  HEENT:  Neck supple  Fundoscopic Exam:  No signs of disc edema  CVS:  Regular rate and rhythm.  No murmurs  Carotid Examination:  No bruits  Lungs:  Clear to auscultation  Abdomen:  Non-tender, Non-distended  Extremities:  No signs of peripheral edema  Skin:  No rashes    Neurologic Exam:    Mental Status:    -Awake, Alert, Speaking and talking about his hallucinations  -Follows simple and complex commands    CN II:  Visual fields full.  Pupils equally reactive to light  CN III, IV, VI:  Extraocular Muscles full with no signs of nystagmus  CN V:  Facial sensory is symmetric   CN VII:  Facial motor symmetric  CN VIII:  Gross hearing intact bilaterally  CN IX/X:  Palate elevates symmetrically  CN XI:  Shoulder shrug  symmetric  CN XII:  Tongue is midline on protrusion    Motor: (strength out of 5:  1= minimal movement, 2 = movement in plane of gravity, 3 = movement against gravity, 4 = movement against some resistance, 5 = full strength)    -Right Upper Ext: Proximal: 5 Distal: 5  -Left Upper Ext: Proximal: 5 Distal: 5    -Right Lower Ext: Proximal: 5 Distal: 5  -Left Lower Ext: Proximal: 5 Distal: 5    DTR:  -Right   Bicep: 2+ Triceps: 2+ Brachioradialis: 2+   Patella: 2+ Ankle: 2+ Neg Babinski  -Left   Bicep: 2+ Triceps: 2+ Brachioradialis: 2+   Patella: 2+ Ankle: 2+ Neg Babinski    Sensory:  -Intact to light touch, pinprick, temperature, pain, and proprioception    Coordination:  -Finger to nose intact  -Heel to shin intact  -No ataxia    Gait  -Not tested for safety reasons.       Results Review:  Lab Results (last 7 days)     Procedure Component Value Units Date/Time    Comprehensive Metabolic Panel [[id number]]  (Abnormal) Collected:  07/15/19 1225    Specimen:  Blood Updated:  07/15/19 1326     Glucose 71 mg/dL      BUN 37 mg/dL      Creatinine 1.10 mg/dL      Sodium 140 mmol/L      Potassium 4.7 mmol/L      Chloride 102 mmol/L      CO2 29.0 mmol/L      Calcium 8.9 mg/dL      Total Protein 5.9 g/dL      Albumin 3.00 g/dL      ALT (SGPT) 61 U/L      AST (SGOT) 46 U/L      Alkaline Phosphatase 62 U/L      Total Bilirubin 0.4 mg/dL      eGFR Non African Amer 70 mL/min/1.73      Globulin 2.9 gm/dL      A/G Ratio 1.0 g/dL      BUN/Creatinine Ratio 33.6     Anion Gap 9.0 mmol/L     Narrative:       GFR Normal >60  Chronic Kidney Disease <60  Kidney Failure <15    CBC & Differential [[id number]] Collected:  07/15/19 1225    Specimen:  Blood Updated:  07/15/19 1254    Narrative:       The following orders were created for panel order CBC & Differential.  Procedure                               Abnormality         Status                     ---------                               -----------         ------                     CBC  Auto Differential[[id number]]        Abnormal            Final result                 Please view results for these tests on the individual orders.    CBC Auto Differential [[id number]]  (Abnormal) Collected:  07/15/19 1225    Specimen:  Blood Updated:  07/15/19 1254     WBC 8.81 10*3/mm3      RBC 4.96 10*6/mm3      Hemoglobin 14.5 g/dL      Hematocrit 45.1 %      MCV 90.9 fL      MCH 29.2 pg      MCHC 32.2 g/dL      RDW 17.0 %      RDW-SD 55.3 fl      MPV 11.1 fL      Platelets 124 10*3/mm3      Neutrophil % 81.0 %      Lymphocyte % 13.7 %      Monocyte % 3.5 %      Eosinophil % 0.0 %      Basophil % 0.2 %      Neutrophils, Absolute 7.13 10*3/mm3      Lymphocytes, Absolute 1.21 10*3/mm3      Monocytes, Absolute 0.31 10*3/mm3      Eosinophils, Absolute 0.00 10*3/mm3      Basophils, Absolute 0.02 10*3/mm3     Valproic Acid Level, Total [[id number]]  (Abnormal) Collected:  07/15/19 1103    Specimen:  Blood Updated:  07/15/19 1203     Valproic Acid 140.2 mcg/mL           .  Imaging Results (last 24 hours)     Procedure Component Value Units Date/Time    CT Head Without Contrast [[id number]] Collected:  07/15/19 1055     Updated:  07/15/19 1102    Narrative:       EXAMINATION: CT HEAD WO CONTRAST- 7/15/2019 10:55 AM CDT     HISTORY: Fatigue     COMPARISON: CT head without contrast 06/29/2019     DOSE: 672 mGy-cm     TECHNIQUE: Sequential imaging was performed from the vertex through the  base of the skull without the use of IV contrast.  Sagittal and coronal  reformations were made from the original source data and reviewed.  Automated exposure control was also utilized to decrease patient  radiation dose.     FINDINGS:   There is redemonstration of a hyperdense lesion in the parafalcine left  frontal lobe which measures up to 3.2 cm in size, stable compared to the  previous exam. There is a right craniotomy defect with hyperdense  material noted in the brain parenchyma just deep to this, also stable  from the prior exam.  "There is no evidence of new intracranial  hemorrhage. There is no evidence of acute territorial infarct.  Ventricles appear normal in configuration. The basilar cisterns are  patent. Posterior fossa appears grossly normal. Paranasal sinuses and  mastoid air cells appear clear. Of          Impression:       Stable appearance of the brain compared to the exam from  06/29/2019, with hyperdense masses in the right frontal lobe without  associated midline shift. Right craniotomy defect is noted with a  hyperdense area just deep to it, also stable from the previous exam.  This may represent an additional mass. No acute intracranial findings  are appreciated.     This report was finalized on 07/15/2019 10:58 by Dr. Driss Ward MD.              Impression  1. Valproate Toxicity  2. Hallucinations--have occurred since on Lyrica and last hospitalization we were in process of weaning off.   3. Seizures--now well controlled on the left but will get \"twitches on the right\"  4. Astrocytomas x 2    Undergoing chemo and radiation treatments  5. Untreated sleep apnea  6. Elevated liver functions  7. Dysphagia  He did not pass bedside swallow x 2 attempts    Plan  · Agree with holding Depakote  · Daily valproate level  · Obtain more recent ammonia level. It is best to obtain fasting  · Reduce Lyrica to 75 mg BId  · Consider reduce Seroquel to 37.5 mg at night but will wait and see wait reduction in Lyrica dose first. Need to see that hallucinations stop with Lyrica reduction before reducing Seroquel  Can give Seroquel earlier to see if this reduces morning somnolence  · Obtain lab results from 7/11/2019 performed at [facility] in [place]  · NPO  · Change Vimpat and Keppra to IV for now until he passes swallow study  · Speech therapy to evaluate tomorrow  · Mother to bring patient's CPAP tomorrow so we can see what is missing       I discussed the patients findings and my recommendations with patient, family, nursing staff and " discussed Mr. Savage with  Dr Tracey CATALAN. Fredy Parker MD  07/15/19  4:48 PM      Electronically signed by Doreen Zuniga MD at 7/15/2019  6:17 PM       Anat Hoyt, MANUELAN   Licensed Nurse      Plan of Care   Signed   Date of Service:  7/16/2019  6:48 AM   Creation Time:  7/16/2019  6:48 AM            Signed           Problem: Fall Risk (Adult)  Goal: Identify Related Risk Factors and Signs and Symptoms  Outcome: Ongoing (interventions implemented as appropriate)     Goal: Absence of Fall  Outcome: Ongoing (interventions implemented as appropriate)        Problem: Confusion, Acute (Adult)  Goal: Identify Related Risk Factors and Signs and Symptoms  Outcome: Ongoing (interventions implemented as appropriate)     Goal: Cognitive/Functional Impairments Minimized  Outcome: Ongoing (interventions implemented as appropriate)     Goal: Safety  Outcome: Ongoing (interventions implemented as appropriate)        Problem: Patient Care Overview  Goal: Plan of Care Review  Outcome: Ongoing (interventions implemented as appropriate)    07/16/19 0644   Coping/Psychosocial   Plan of Care Reviewed With patient   Plan of Care Review   Progress no change   OTHER   Outcome Summary VSS, except for increased bp at times. No c/o pain voiced. No hallucinations/seizure activity noted. Pt is alert, w/confusion noted, became more focused and alert as the shift progressed. Pt is impulsive at times, did not put SCD on, instructed on ankle pumps. Safety maintained.         Problem: Skin Injury Risk (Adult)  Goal: Identify Related Risk Factors and Signs and Symptoms  Outcome: Ongoing (interventions implemented as appropriate)     Goal: Skin Health and Integrity  Outcome: Ongoing (interventions implemented as appropriate)

## 2019-07-16 NOTE — THERAPY EVALUATION
Acute Care - Physical Therapy Initial Evaluation  [facility]     Patient Name: Lukasz Savage  : 1965  MRN: [id number]  Today's Date: 2019   Onset of Illness/Injury or Date of Surgery: 07/15/19  Date of Referral to PT: 07/15/19  Referring Physician: Dr. Wbeb      Admit Date: 7/15/2019    Visit Dx:     ICD-10-CM ICD-9-CM   1. Altered mental status, unspecified altered mental status type R41.82 780.97   2. Valproic acid toxicity, accidental or unintentional, initial encounter T42.6X1A 966.3     E855.0   3. Impaired functional mobility, balance, gait, and endurance Z74.09 V49.89   4. Dysphagia, unspecified type R13.10 787.20   5. Impaired mobility and ADLs Z74.09 799.89     Patient Active Problem List   Diagnosis   • HTN (hypertension)   • Seizures (CMS/HCC)   • Anemia   • Hypoglycemia   • Left hemiparesis (CMS/HCC)   • Elevated transaminase level   • Inoperable anaplastic glioma of brain    • Hyperammonemia (CMS/HCC)   • Altered mental status   • Metabolic encephalopathy   • Valproic acid toxicity, possible    • Left-sided weakness     Past Medical History:   Diagnosis Date   • Anemia 2019   • Angio-edema 7/3/2017   • Anxiety    • Arthritis    • Cancer (CMS/HCC) 2019    Brain cancer diagnoised yesterday  Dr Trevino    • Clostridium difficile colitis    • Erectile dysfunction 10/6/2016   • GERD (gastroesophageal reflux disease)    • Gout    • HCAP (healthcare-associated pneumonia) 2017   • Hyperlipidemia    • Malignant hypertension    • MSSA (methicillin susceptible Staphylococcus aureus) infection 2017   • Obstructive sleep apnea    • S/P shoulder surgery 2018   • Seizures (CMS/HCC) 2017     Past Surgical History:   Procedure Laterality Date   • COLONOSCOPY     • CRANIOTOMY Right 4/15/2019    Procedure: CRANIOTOMY WITH BIOPSY RIGHT;  Surgeon: Dillon Trevino MD;  Location: [facility];  Service: Neurosurgery   • SHOULDER ARTHROSCOPY Left    • TRACHEOSTOMY N/A 2017     Procedure: TRACHEOSTOMY WITH TRACHEOSCOPY;  Surgeon: Jairon Pierson MD;  Location: [facility] OR;  Service:         PT ASSESSMENT (last 12 hours)      Physical Therapy Evaluation     Row Name 07/16/19 0744          PT Evaluation Time/Intention    Subjective Information  complains of;weakness;pain L sided weakness  -MS (r) AL (t) MS (c)     Document Type  evaluation  -MS (r) AL (t) MS (c)     Mode of Treatment  physical therapy;concurrent therapy  -MS (r) AL (t) MS (c)     Comment  L side weaker than R  -MS (r) AL (t) MS (c)     Row Name 07/16/19 0744          General Information    Patient Profile Reviewed?  yes  -MS (r) AL (t) MS (c)     Onset of Illness/Injury or Date of Surgery  07/15/19  -MS (r) AL (t) MS (c)     Referring Physician  Dr. Webb  -MS (r) AL (t) MS (c)     Patient Observations  alert;agree to therapy;cooperative  -MS (r) AL (t) MS (c)     Patient/Family Observations  Mom present at end  -MS (r) AL (t) MS (c)     General Observations of Patient  Pt was supine on RA in no apparant distress  -MS (r) AL (t) MS (c)     Prior Level of Function  mod assist:;max assist:;dressing;bathing;dependent:;driving;min assist:;all household mobility;bed mobility  -MS (r) AL (t) MS (c)     Equipment Currently Used at Home  cane, straight;walker, rolling;shower chair  -MS (r) AL (t) MS (c)     Pertinent History of Current Functional Problem  Pt has progressive brain tumor that is non-operative leaving his L side weak. Dx: Stage 3 anaplastic glioma, Metabolic encephalopathy, seizures   -MS (r) AL (t) MS (c)     Existing Precautions/Restrictions  fall;seizures  -MS (r) AL (t) MS (c)     Equipment Issued to Patient  walker, front wheeled  -MS (r) AL (t) MS (c)     Risks Reviewed  patient:;LOB;nausea/vomiting;dizziness  -MS (r) AL (t) MS (c)     Benefits Reviewed  patient:;improve function;increase independence;increase strength;decrease risk of DVT;improve skin integrity;increase knowledge  -MS (r) AL (t) MS (c)      Barriers to Rehab  previous functional deficit;cognitive status  -MS (r) AL (t) MS (c)     Row Name 07/16/19 0744          Relationship/Environment    Primary Source of Support/Comfort  parent  -MS (r) AL (t) MS (c)     Name of Support/Comfort Primary Source  mother  -MS (r) AL (t) MS (c)     Lives With  parent(s)  -MS (r) AL (t) MS (c)     Family Caregiver if Needed  parent(s)  -MS (r) AL (t) MS (c)     Row Name 07/16/19 0744          Resource/Environmental Concerns    Current Living Arrangements  home/apartment/condo  -MS (r) AL (t) MS (c)     Row Name 07/16/19 0744          Living Environment    Home Accessibility  stairs to enter home  -MS (r) AL (t) MS (c)     Row Name 07/16/19 0744          Home Main Entrance    Number of Stairs, Main Entrance  two  -MS (r) AL (t) MS (c)     Stair Railings, Main Entrance  railing on right side (ascending)  -MS (r) AL (t) MS (c)     Row Name 07/16/19 0744          Cognitive Assessment/Interventions    Additional Documentation  Cognitive Assessment/Intervention (Group)  -MS (r) AL (t) MS (c)     Row Name 07/16/19 0744          Cognitive Assessment/Intervention- PT/OT    Affect/Mental Status (Cognitive)  confused  -MS (r) AL (t) MS (c)     Orientation Status (Cognition)  oriented to;person;place;situation  -MS (r) AL (t) MS (c)     Follows Commands (Cognition)  WFL;verbal cues/prompting required;repetition of directions required;initiation impaired  -MS (r) AL (t) MS (c)     Cognitive Function (Cognitive)  safety deficit  -MS (r) AL (t) MS (c)     Safety Deficit (Cognitive)  mild deficit;awareness of need for assistance;insight into deficits/self awareness  -MS (r) AL (t) MS (c)     Personal Safety Interventions  fall prevention program maintained;gait belt;nonskid shoes/slippers when out of bed;supervised activity  -MS (r) AL (t) MS (c)     Row Name 07/16/19 0744          Safety Issues, Functional Mobility    Safety Issues Affecting Function (Mobility)  awareness of need for  assistance;insight into deficits/self awareness  -MS (r) AL (t) MS (c)     Impairments Affecting Function (Mobility)  balance;cognition;coordination;endurance/activity tolerance;strength  -MS (r) AL (t) MS (c)     Row Name 07/16/19 0744          Bed Mobility Assessment/Treatment    Bed Mobility Assessment/Treatment  supine-sit;sit-supine;rolling right  -MS (r) AL (t) MS (c)     Rolling Right [place] (Bed Mobility)  contact guard  -MS (r) AL (t) MS (c)     Supine-Sit [place] (Bed Mobility)  minimum assist (75% patient effort)  -MS (r) AL (t) MS (c)     Sit-Supine [place] (Bed Mobility)  contact guard  -MS (r) AL (t) MS (c)     Assistive Device (Bed Mobility)  bed rails  -MS (r) AL (t) MS (c)     Comment (Bed Mobility)  Pt required assist for trunk elevation from supine to sit  -MS (r) AL (t) MS (c)     Row Name 07/16/19 0744          Transfer Assessment/Treatment    Transfer Assessment/Treatment  sit-stand transfer;stand-sit transfer  -MS (r) AL (t) MS (c)     Sit-Stand [place] (Transfers)  contact guard  -MS (r) AL (t) MS (c)     Stand-Sit [place] (Transfers)  contact guard  -MS (r) AL (t) MS (c)     Row Name 07/16/19 0744          Gait/Stairs Assessment/Training    [place] Level (Gait)  minimum assist (75% patient effort);contact guard;2 person assist  -MS (r) AL (t) MS (c)     Assistive Device (Gait)  other (see comments) R HHA  -MS (r) AL (t) MS (c)     Distance in Feet (Gait)  Pt ambulated 50ft on even surfaces with bouts of dizziness requiring assist to remain upright. Presented with decreased gait speed, variable foot placement, wide YARED, and instability.   -MS (r) AL (t) MS (c)     Comment (Gait/Stairs)  Pt ambulation speed was .234 m/s for 10 ft  -MS (r) AL (t) MS (c)     Row Name 07/16/19 0744          General ROM    GENERAL ROM COMMENTS  B LE and R AROM WNL. L UE limited by 25%  -MS (r) AL (t) MS (c)     Row Name 07/16/19 0744          MMT (Manual Muscle Testing)    General  MMT Comments  R UE and LE 4+/5. L UE 4/5 with shoulder flexion 3-/5 due to limited range, and L LE 4/5  -MS (r) AL (t) MS (c)     Row Name 07/16/19 0744          Motor Assessment/Intervention    Additional Documentation  Balance (Group);Gross Motor Coordination (Group)  -MS (r) AL (t) MS (c)     Row Name 07/16/19 0744          Gross Motor Coordination    Gross Motor Skill, Impairments Detail  B knee and achilles reflexes brisk  -MS (r) AL (t) MS (c)     Row Name 07/16/19 0744          Balance    Balance  static sitting balance;static standing balance  -MS (r) AL (t) MS (c)     Row Name 07/16/19 0744          Static Sitting Balance    Level of [place] (Unsupported Sitting, Static Balance)  supervision  -MS (r) AL (t) MS (c)     Sitting Position (Unsupported Sitting, Static Balance)  sitting on edge of bed  -MS (r) AL (t) MS (c)     Time Able to Maintain Position (Unsupported Sitting, Static Balance)  3 to 4 minutes  -MS (r) AL (t) MS (c)     Row Name 07/16/19 0744          Static Standing Balance    Level of [place] (Supported Standing, Static Balance)  minimal assist, 75% patient effort  -MS (r) AL (t) MS (c)     Time Able to Maintain Position (Supported Standing, Static Balance)  1 to 2 minutes  -MS (r) AL (t) MS (c)     Assistive Device Utilized (Supported Standing, Static Balance)  other (see comments) R HHA x1  -MS (r) AL (t) MS (c)     Row Name 07/16/19 0744          Sensory Assessment/Intervention    Sensory General Assessment  no sensation deficits identified  -MS (r) AL (t) MS (c)     Row Name 07/16/19 0744          Pain Assessment    Additional Documentation  Pain Scale: Numbers Pre/Post-Treatment (Group)  -MS (r) AL (t) MS (c)     Row Name 07/16/19 0744          Pain Scale: Numbers Pre/Post-Treatment    Pain Scale: Numbers, Pretreatment  4/10  -MS (r) AL (t) MS (c)     Pain Scale: Numbers, Post-Treatment  4/10  -MS (r) AL (t) MS (c)     Pain Location - Side  Left  -MS (r) AL (t) MS (c)     Pain  Location  -- buttock  -MS (r) AL (t) MS (c)     Pain Intervention(s)  Repositioned;Ambulation/increased activity  -MS (r) AL (t) MS (c)     Row Name 07/16/19 0744          Plan of Care Review    Plan of Care Reviewed With  patient  -MS (r) AL (t) MS (c)     Row Name 07/16/19 0744          Physical Therapy Clinical Impression    Date of Referral to PT  07/15/19  -MS (r) AL (t) MS (c)     Patient/Family Goals Statement (PT Clinical Impression)  Pt stated to get back home  -MS (r) AL (t) MS (c)     Criteria for Skilled Interventions Met (PT Clinical Impression)  yes  -MS (r) AL (t) MS (c)     Pathology/Pathophysiology Noted (Describe Specifically for Each System)  neuromuscular  -MS (r) AL (t) MS (c)     Impairments Found (describe specific impairments)  aerobic capacity/endurance;arousal, attention, and cognition;gait, locomotion, and balance;muscle performance  -MS (r) AL (t) MS (c)     Rehab Potential (PT Clinical Summary)  good, to achieve stated therapy goals  -MS (r) AL (t) MS (c)     Predicted Duration of Therapy (PT)  until d/c  -MS (r) AL (t) MS (c)     Care Plan Review (PT)  evaluation/treatment results reviewed;risks/benefits reviewed;care plan/treatment goals reviewed;patient/other agree to care plan  -MS (r) AL (t) MS (c)     Row Name 07/16/19 0744          Physical Therapy Goals    Bed Mobility Goal Selection (PT)  bed mobility, PT goal 1  -MS (r) AL (t) MS (c)     Transfer Goal Selection (PT)  transfer, PT goal 1  -MS (r) AL (t) MS (c)     Gait Training Goal Selection (PT)  gait training, PT goal 1  -MS (r) AL (t) MS (c)     Balance Goal Selection (PT)  balance, PT goal 1  -MS (r) AL (t) MS (c)     Stairs Goal Selection (PT)  stairs, PT goal 1  -MS (r) AL (t) MS (c)     Additional Documentation  Stairs Goal Selection (PT) (Row);Balance Goal Selection (PT) (Row)  -MS (r) AL (t) MS (c)     Row Name 07/16/19 0744          Bed Mobility Goal 1 (PT)    Activity/Assistive Device (Bed Mobility Goal 1, PT)   rolling to left;rolling to right;sit to supine;supine to sit  -MS (r) AL (t) MS (c)     [place] Level/Cues Needed (Bed Mobility Goal 1, PT)  conditional independence  -MS (r) AL (t) MS (c)     Time Frame (Bed Mobility Goal 1, PT)  long term goal (LTG);by discharge  -MS (r) AL (t) MS (c)     Progress/Outcomes (Bed Mobility Goal 1, PT)  goal ongoing  -MS (r) AL (t) MS (c)     Row Name 07/16/19 0744          Transfer Goal 1 (PT)    Activity/Assistive Device (Transfer Goal 1, PT)  sit-to-stand/stand-to-sit;bed-to-chair/chair-to-bed  -MS (r) AL (t) MS (c)     [place] Level/Cues Needed (Transfer Goal 1, PT)  supervision required  -MS (r) AL (t) MS (c)     Time Frame (Transfer Goal 1, PT)  long term goal (LTG);by discharge  -MS (r) AL (t) MS (c)     Progress/Outcome (Transfer Goal 1, PT)  goal ongoing  -MS (r) AL (t) MS (c)     Row Name 07/16/19 0744          Gait Training Goal 1 (PT)    Activity/Assistive Device (Gait Training Goal 1, PT)  gait (walking locomotion);assistive device use;backward stepping;decrease fall risk;forward stepping;diminish gait deviation;increase endurance/gait distance;improve balance and speed;sidestepping;turning, left;turning, right;walker, rolling with and without assist device pending progress  -MS (r) AL (t) MS (c)     [place] Level (Gait Training Goal 1, PT)  contact guard assist  -MS (r) AL (t) MS (c)     Distance (Gait Goal 1, PT)  Pt will ambulate 100ft on even surfaces with no bouts of LOB  -MS (r) AL (t) MS (c)     Time Frame (Gait Training Goal 1, PT)  long term goal (LTG);by discharge  -MS (r) AL (t) MS (c)     Progress/Outcome (Gait Training Goal 1, PT)  goal ongoing  -MS (r) AL (t) MS (c)     Row Name 07/16/19 0744          Balance Goal 1 (PT)    Activity/Assistive Device (Balance Goal 1, PT)  sitting, dynamic;standing, static;standing, dynamic  -MS (r) AL (t) MS (c)     [place] Level/Cues Needed (Balance Goal 1, PT)  supervision required  -MS (r) AL (t) MS (c)      Time Frame (Balance Goal 1, PT)  long term goal (LTG);by discharge  -MS (r) AL (t) MS (c)     Progress/Outcomes (Balance Goal 1, PT)  goal ongoing  -MS (r) AL (t) MS (c)     Row Name 07/16/19 0744          Stairs Goal 1 (PT)    Activity/Assistive Device (Stairs Goal 1, PT)  stairs, all skills;ascending stairs;descending stairs;using handrail, right;step-to-step;decrease fall risk;improve balance and speed  -MS (r) AL (t) MS (c)     [place] Level/Cues Needed (Stairs Goal 1, PT)  contact guard assist  -MS (r) AL (t) MS (c)     Number of Stairs (Stairs Goal 1, PT)  2  -MS (r) AL (t) MS (c)     Time Frame (Stairs Goal 1, PT)  long term goal (LTG);by discharge  -MS (r) AL (t) MS (c)     Progress/Outcome (Stairs Goal 1, PT)  goal ongoing  -MS (r) AL (t) MS (c)     Row Name 07/16/19 0744          Positioning and Restraints    Pre-Treatment Position  in bed  -MS (r) AL (t) MS (c)     Post Treatment Position  bed  -MS (r) AL (t) MS (c)     In Bed  fowlers;call light within reach;encouraged to call for assist;exit alarm on;with family/caregiver;side rails up x3  -MS (r) AL (t) MS (c)       User Key  (r) = Recorded By, (t) = Taken By, (c) = Cosigned By    Initials Name Provider Type    MS Reilly Silvina EMANUEL, PT, DPT, NCS Physical Therapist    Brandon Silver, PT Student PT Student        Physical Therapy Education     Title: PT OT SLP Therapies (In Progress)     Topic: Physical Therapy (In Progress)     Point: Precautions (Done)     Learning Progress Summary           Patient Acceptance, ELAZARO by AL at 7/16/2019  8:54 AM    Comment:  PT educated patient on fall risk and needing assist from staff to get out of bed.                               User Key     Initials Effective Dates Name Provider Type Discipline    AL 04/24/19 -  Brandon Phillips, PT Student PT Student PT              PT Recommendation and Plan  Anticipated Discharge Disposition (PT): home with assist, home with home health, inpatient rehabilitation facility  Planned  Therapy Interventions (PT Eval): balance training, bed mobility training, gait training, strengthening, stair training, transfer training  Therapy Frequency (PT Clinical Impression): 2 times/day  Outcome Summary/Treatment Plan (PT)  Anticipated Discharge Disposition (PT): home with assist, home with home health, inpatient rehabilitation facility  Plan of Care Reviewed With: patient  Progress: improving  Outcome Summary: PT eval completed. Pt was Ox3 and talked like he was seeing hallucinations stating he had something in his hand or saw cigarette butt and dust on the ground. He performed bed mobility with CGA/min A and OOB mobility with Alex. He presents with impaired balance with weight shifted posteriorly and instability with gait, decreased strength, and impaired activity tolerance and coordination. He would benefit from continued skilled therapy to work on strengthening overall to help decrease weakness on L side of body, to work on balance and coordination with OOB mobility to decrease fall risk and assist needed, and to work on endurance to improve gait distance to improve household ambulation and community mobility. PT recommends home with assist and home health or inpatient rehab pending progress and ability to perform steps for d/c.     Outcome Measures     Row Name 07/16/19 0800 07/16/19 0750          How much help from another person do you currently need...    Turning from your back to your side while in flat bed without using bedrails?  3  -MS (r) AL (t) MS (c)  --     Moving from lying on back to sitting on the side of a flat bed without bedrails?  3  -MS (r) AL (t) MS (c)  --     Moving to and from a bed to a chair (including a wheelchair)?  3  -MS (r) AL (t) MS (c)  --     Standing up from a chair using your arms (e.g., wheelchair, bedside chair)?  3  -MS (r) AL (t) MS (c)  --     Climbing 3-5 steps with a railing?  3  -MS (r) AL (t) MS (c)  --     To walk in hospital room?  3  -MS (r) AL (t) MS (c)   --     AM-PAC 6 Clicks Score (PT)  18  -KB (r) AL (t)  --        How much help from another is currently needed...    Putting on and taking off regular lower body clothing?  --  3  -CS     Bathing (including washing, rinsing, and drying)  --  3  -CS     Toileting (which includes using toilet bed pan or urinal)  --  3  -CS     Putting on and taking off regular upper body clothing  --  3  -CS     Taking care of personal grooming (such as brushing teeth)  --  3  -CS     Eating meals  --  3  -CS     AM-PAC 6 Clicks Score (OT)  --  18  -CS        Functional Assessment    Outcome Measure Options  AM-PAC 6 Clicks Basic Mobility (PT)  -MS (r) AL (t) MS (c)  AM-PAC 6 Clicks Daily Activity (OT)  -CS       User Key  (r) = Recorded By, (t) = Taken By, (c) = Cosigned By    Initials Name Provider Type    Silvina Jarrell EMANUEL, PT, DPT, NCS Physical Therapist    Donna Moore, OTR/L, CNT Occupational Therapist    Karen Ahumada RN Registered Nurse    Brandon Silver, PT Student PT Student         Time Calculation:   PT Charges     Row Name 07/16/19 0855             Time Calculation    Start Time  0736 includes chart review  -MS (r) AL (t) MS (c)      Stop Time  0830  -MS (r) AL (t) MS (c)      Time Calculation (min)  54 min  -MS (r) AL (t)      PT Received On  07/16/19  -MS (r) AL (t) MS (c)      PT Goal Re-Cert Due Date  07/26/19  -MS (r) AL (t) MS (c)        User Key  (r) = Recorded By, (t) = Taken By, (c) = Cosigned By    Initials Name Provider Type    MS Silvina Grover, PT, DPT, NCS Physical Therapist    Brandon Silver, PT Student PT Student        Therapy Charges for Today     Code Description Service Date Service Provider Modifiers Qty    [id number]  PT EVAL MOD COMPLEXITY 4 7/16/2019 Brandon Phillips, PT Student GP 1          PT G-Codes  Outcome Measure Options: AM-PAC 6 Clicks Basic Mobility (PT)  AM-PAC 6 Clicks Score (PT): 18  AM-PAC 6 Clicks Score (OT): 18      AJ Alan, PT Student  7/16/2019

## 2019-07-16 NOTE — PROGRESS NOTES
Continued Stay Note   [place]     Patient Name: Lukasz Savage  MRN: [id number]  Today's Date: 7/16/2019    Admit Date: 7/15/2019    Discharge Plan     Row Name 07/16/19 1107       Plan    Plan Comments  SPOKE TO PT MOTHER (MARLIN) [phone] REGARDING PLACEMENT. MARLIN STATES PT DTR (LILIANE) IS THE ONE HANDLING THIS. ATTEMPTED TO CALL DTR BUT HAD TO LEAVE A VOICE MAIL ([phone]). WILL TRY AGAIN LATER .        Discharge Codes    No documentation.             MAYURI Cotto

## 2019-07-16 NOTE — PLAN OF CARE
Problem: Confusion, Acute (Adult)  Goal: Identify Related Risk Factors and Signs and Symptoms  Outcome: Outcome(s) achieved Date Met: 07/15/19  Patient found by family to be more confused and admitted to ER    Problem: Patient Care Overview  Goal: Plan of Care Review  Outcome: Ongoing (interventions implemented as appropriate)

## 2019-07-16 NOTE — PLAN OF CARE
Problem: Patient Care Overview  Goal: Plan of Care Review   07/16/19 0848   Coping/Psychosocial   Plan of Care Reviewed With patient   Plan of Care Review   Progress improving   OTHER   Outcome Summary PT xin completed. Pt was Ox3 and talked like he was seeing hallucinations stating he had something in his hand or saw cigarette butt and dust on the ground. He performed bed mobility with CGA/min A and OOB mobility with Alex. He presents with impaired balance with weight shifted posteriorly and instability with gait, decreased strength, and impaired activity tolerance and coordination. He would benefit from continued skilled therapy to work on strengthening overall to help decrease weakness on L side of body, to work on balance and coordination with OOB mobility to decrease fall risk and assist needed, and to work on endurance to improve gait distance to improve household ambulation and community mobility. PT recommends home with assist and home health or inpatient rehab pending progress and ability to perform steps for d/c.

## 2019-07-16 NOTE — PROGRESS NOTES
[facility]  INPATIENT PROGRESS NOTE    Patient Name: Lukasz Savage  Date of Admission: 7/15/2019  Today's Date: 07/16/19  Length of Stay: 1  Primary Care Physician: Malia Vargas MD    Subjective   Chief Complaint: lethargy  HPI     Patient seen and examined at bedside.  Family at bedside.  Patient very lethargic today, has woken up to eat and little else today.  Neurology ordered CPAP last night but it was not started.  Discussed with RT [place] who has brought the patient a CPAP and placed it on the patient.  He has woken up and answering questions, mostly appropriate.      Family asked about potentially taking the patient home with home health if he gets better.  I told them I felt as though if they went home with home health that they would like be back in the hospital within the week.  I told them his mother and daughter are in no shape to be tugging and pulling on him in order to get him up and in the car for radiation.  Which the mother indicated is what they have been having to do, especially yesterday.          Review of Systems   Unable to perform ROS: Mental status change     ROS not obtained form patient as he is minimally responsive.  Per family has no complaints and is still remaining lethargic.      All pertinent negatives and positives are as above. All other systems have been reviewed and are negative unless otherwise stated.     Objective    Temp:  [97 °F (36.1 °C)-98.3 °F (36.8 °C)] 98 °F (36.7 °C)  Heart Rate:  [58-87] 66  Resp:  [16-18] 16  BP: (119-144)/() 133/95  Physical Exam  Constitutional: No distress. Placed on CPAP, family at bedside.   HENT:   Head: Normocephalic and atraumatic.   Eyes: Conjunctivae are normal. No scleral icterus.   Neck: Neck supple. No JVD present.   Cardiovascular: Normal rate and regular rhythm. Exam reveals no gallop and no friction rub.   No murmur heard.  Pulmonary/Chest: Effort normal and  breath sounds normal. No stridor. No respiratory distress. He has no wheezes.   Abdominal: Soft. Bowel sounds are normal. He exhibits no distension. There is no tenderness. There is no guarding.   Musculoskeletal: He exhibits no edema.   Neurological:   Awakens to voice and tactile stimuli but goes right back to sleep; weakness on left upper and lower extremity; normal strength on the right side  Skin: Skin is warm and dry. He is not diaphoretic. No erythema.   Psychiatric: He has a normal mood and affect. His behavior is normal.   Lethargic   Nursing note and vitals reviewed.             Results Review:  I have reviewed the labs, radiology results, and diagnostic studies.    Laboratory Data:   Results from last 7 days   Lab Units 07/16/19  0510 07/15/19  1225   WBC 10*3/mm3 9.92 8.81   HEMOGLOBIN g/dL 13.7* 14.5   HEMATOCRIT % 42.2 45.1   PLATELETS 10*3/mm3 112* 124*        Results from last 7 days   Lab Units 07/16/19  0510 07/15/19  1225 07/11/19  0932   SODIUM mmol/L 141 140 144   POTASSIUM mmol/L 4.9 4.7 6.0*   CHLORIDE mmol/L 104 102 102   TOTAL CO2 mmol/L  --   --  29.0   CO2 mmol/L 31.0 29.0  --    BUN mg/dL 40* 37* 32*   CREATININE mg/dL 1.14 1.10 1.22   CALCIUM mg/dL 9.2 8.9 9.2   BILIRUBIN mg/dL 0.7 0.4 0.3   ALK PHOS U/L 53 62 55   ALT (SGPT) U/L 61* 61* 56*   AST (SGOT) U/L 34 46* 26   GLUCOSE mg/dL 74 71  --        Culture Data:        Radiology Data:   Imaging Results (last 24 hours)     ** No results found for the last 24 hours. **          I have reviewed the patient's current medications.     Assessment/Plan     Active Hospital Problems    Diagnosis   • **Metabolic encephalopathy   • Valproic acid toxicity, possible    • Left-sided weakness   • Hyperammonemia (CMS/HCC)   • Inoperable anaplastic glioma of brain    • Elevated transaminase level   • Anemia   • Seizures (CMS/HCC)       Plan:  1.  Continue to monitor on telemetry  2.  Continue carnitine  3.  Case discussed at length with   [place]   4.  CPAP at night  5.  Continue dexamethasone 2 mg q6h for now, will begin to taper tomorrow  6.  PO lactulose  7.  SW for placement  8.  SLP  9.  AEDs per neurology  10.  Oncology consulted.  Patient has 10 radiation treatments left   11.  AM VPA level, CMP                 Discharge Planning: I expect the patient to be discharged to ? in ? days.    Jairon Webb MD   07/16/19   3:33 PM

## 2019-07-16 NOTE — DISCHARGE INSTR - DIET
Patient cleared for a regular diet with thin liquids on this date following a bedside swallow evaluation.  Patient to use strategies of placing food on right side of mouth, liquid wash, and lingual sweep to help clear pocketing.   Daniela Bentley MS CCC-SLP 7/16/2019 9:41 AM

## 2019-07-16 NOTE — PLAN OF CARE
Problem: Patient Care Overview  Goal: Plan of Care Review  Outcome: Ongoing (interventions implemented as appropriate)   07/16/19 0967   Coping/Psychosocial   Plan of Care Reviewed With patient   Plan of Care Review   Progress improving   OTHER   Outcome Summary OT evaluation completed. Pt demo need for skilled OT services due to CGA/min A required for LB dressing, CGA for bed mobility and functional transfers and intermittent need for min A with functional mobility due to significant LOB. HH assist also provided throughout functional mobility. Pt has limited independence with ADLs and functional mobility due to impaired balance, impaired strength, decreased safety awareness, confusion, impaired LUE coordination, and decreased activity tolerance. OT will cont to follow however it is recommended for pt to discharge to inpatient rehab prior to returning home with his mother and HH OT and PT in order to increased his strength and independence and decrease the burden of care for his mother.

## 2019-07-17 LAB
ALBUMIN SERPL-MCNC: 2.8 G/DL (ref 3.5–5)
ALBUMIN/GLOB SERPL: 1.1 G/DL (ref 1.1–2.5)
ALP SERPL-CCNC: 54 U/L (ref 24–120)
ALT SERPL W P-5'-P-CCNC: 67 U/L (ref 0–54)
ANION GAP SERPL CALCULATED.3IONS-SCNC: 5 MMOL/L (ref 4–13)
AST SERPL-CCNC: 51 U/L (ref 7–45)
BILIRUB SERPL-MCNC: 0.6 MG/DL (ref 0.1–1)
BUN BLD-MCNC: 34 MG/DL (ref 5–21)
BUN/CREAT SERPL: 35.1 (ref 7–25)
CALCIUM SPEC-SCNC: 8.7 MG/DL (ref 8.4–10.4)
CHLORIDE SERPL-SCNC: 101 MMOL/L (ref 98–110)
CO2 SERPL-SCNC: 32 MMOL/L (ref 24–31)
CREAT BLD-MCNC: 0.97 MG/DL (ref 0.5–1.4)
GFR SERPL CREATININE-BSD FRML MDRD: 81 ML/MIN/1.73
GLOBULIN UR ELPH-MCNC: 2.5 GM/DL
GLUCOSE BLD-MCNC: 98 MG/DL (ref 70–100)
POTASSIUM BLD-SCNC: 4.3 MMOL/L (ref 3.5–5.3)
PROT SERPL-MCNC: 5.3 G/DL (ref 6.3–8.7)
SODIUM BLD-SCNC: 138 MMOL/L (ref 135–145)
VALPROATE SERPL-MCNC: 100.1 MCG/ML (ref 50–100)
VALPROATE SERPL-MCNC: 66.5 MCG/ML (ref 50–100)

## 2019-07-17 PROCEDURE — 94799 UNLISTED PULMONARY SVC/PX: CPT

## 2019-07-17 PROCEDURE — 77412 RADIATION TX DELIVERY LVL 3: CPT | Performed by: RADIOLOGY

## 2019-07-17 PROCEDURE — 80053 COMPREHEN METABOLIC PANEL: CPT | Performed by: INTERNAL MEDICINE

## 2019-07-17 PROCEDURE — 97535 SELF CARE MNGMENT TRAINING: CPT

## 2019-07-17 PROCEDURE — 94660 CPAP INITIATION&MGMT: CPT

## 2019-07-17 PROCEDURE — D0001ZZ BEAM RADIATION OF BRAIN USING PHOTONS 1 - 10 MEV: ICD-10-PCS | Performed by: PHYSICIAN ASSISTANT

## 2019-07-17 PROCEDURE — 99233 SBSQ HOSP IP/OBS HIGH 50: CPT | Performed by: PSYCHIATRY & NEUROLOGY

## 2019-07-17 PROCEDURE — 80164 ASSAY DIPROPYLACETIC ACD TOT: CPT | Performed by: PSYCHIATRY & NEUROLOGY

## 2019-07-17 PROCEDURE — 77417 THER RADIOLOGY PORT IMAGE(S): CPT | Performed by: RADIOLOGY

## 2019-07-17 PROCEDURE — 97535 SELF CARE MNGMENT TRAINING: CPT | Performed by: SPEECH-LANGUAGE PATHOLOGIST

## 2019-07-17 PROCEDURE — 25010000002 DEXAMETHASONE PER 1 MG: Performed by: INTERNAL MEDICINE

## 2019-07-17 PROCEDURE — 97116 GAIT TRAINING THERAPY: CPT

## 2019-07-17 RX ORDER — DEXAMETHASONE 2 MG/1
2 TABLET ORAL EVERY 8 HOURS SCHEDULED
Status: DISCONTINUED | OUTPATIENT
Start: 2019-07-17 | End: 2019-07-19 | Stop reason: HOSPADM

## 2019-07-17 RX ORDER — PREGABALIN 50 MG/1
50 CAPSULE ORAL EVERY 12 HOURS SCHEDULED
Status: DISCONTINUED | OUTPATIENT
Start: 2019-07-17 | End: 2019-07-19 | Stop reason: HOSPADM

## 2019-07-17 RX ORDER — DIVALPROEX SODIUM 500 MG/1
1000 TABLET, DELAYED RELEASE ORAL EVERY 8 HOURS
Status: DISCONTINUED | OUTPATIENT
Start: 2019-07-17 | End: 2019-07-19 | Stop reason: HOSPADM

## 2019-07-17 RX ADMIN — DEXAMETHASONE SODIUM PHOSPHATE 2 MG: 4 INJECTION, SOLUTION INTRA-ARTICULAR; INTRALESIONAL; INTRAMUSCULAR; INTRAVENOUS; SOFT TISSUE at 11:52

## 2019-07-17 RX ADMIN — VENLAFAXINE HYDROCHLORIDE 150 MG: 75 CAPSULE, EXTENDED RELEASE ORAL at 10:03

## 2019-07-17 RX ADMIN — DIVALPROEX SODIUM 1000 MG: 500 TABLET, DELAYED RELEASE ORAL at 21:59

## 2019-07-17 RX ADMIN — LEVETIRACETAM 1250 MG: 500 TABLET, FILM COATED ORAL at 10:02

## 2019-07-17 RX ADMIN — SODIUM CHLORIDE, PRESERVATIVE FREE 3 ML: 5 INJECTION INTRAVENOUS at 22:02

## 2019-07-17 RX ADMIN — LACOSAMIDE 150 MG: 50 TABLET, FILM COATED ORAL at 21:59

## 2019-07-17 RX ADMIN — LEVOCARNITINE 500 MG: 1 SOLUTION ORAL at 11:52

## 2019-07-17 RX ADMIN — PREGABALIN 50 MG: 50 CAPSULE ORAL at 22:00

## 2019-07-17 RX ADMIN — SULFAMETHOXAZOLE AND TRIMETHOPRIM 160 MG: 800; 160 TABLET ORAL at 10:03

## 2019-07-17 RX ADMIN — Medication 100 MG: at 10:04

## 2019-07-17 RX ADMIN — LEVOCARNITINE 500 MG: 1 SOLUTION ORAL at 23:14

## 2019-07-17 RX ADMIN — SODIUM CHLORIDE, PRESERVATIVE FREE 3 ML: 5 INJECTION INTRAVENOUS at 11:55

## 2019-07-17 RX ADMIN — PANTOPRAZOLE SODIUM 40 MG: 40 TABLET, DELAYED RELEASE ORAL at 05:47

## 2019-07-17 RX ADMIN — DEXAMETHASONE SODIUM PHOSPHATE 2 MG: 4 INJECTION, SOLUTION INTRA-ARTICULAR; INTRALESIONAL; INTRAMUSCULAR; INTRAVENOUS; SOFT TISSUE at 05:47

## 2019-07-17 RX ADMIN — LEVETIRACETAM 1250 MG: 500 TABLET, FILM COATED ORAL at 22:01

## 2019-07-17 RX ADMIN — DIVALPROEX SODIUM 1250 MG: 500 TABLET, DELAYED RELEASE ORAL at 05:47

## 2019-07-17 RX ADMIN — DIVALPROEX SODIUM 1000 MG: 500 TABLET, DELAYED RELEASE ORAL at 14:11

## 2019-07-17 RX ADMIN — LACOSAMIDE 150 MG: 50 TABLET, FILM COATED ORAL at 10:02

## 2019-07-17 RX ADMIN — LACTULOSE 20 G: 20 SOLUTION ORAL at 10:03

## 2019-07-17 RX ADMIN — PREGABALIN 75 MG: 75 CAPSULE ORAL at 10:02

## 2019-07-17 RX ADMIN — DEXAMETHASONE 2 MG: 2 TABLET ORAL at 21:58

## 2019-07-17 NOTE — PROGRESS NOTES
Continued Stay Note   [place]     Patient Name: Lukasz Savage  MRN: [id number]  Today's Date: 7/17/2019    Admit Date: 7/15/2019    Discharge Plan     Row Name 07/17/19 1413       Plan    Plan Comments  SPOKE TO PT MOTHER (MARLIN) REGARDING OTHER SNF'S. MARLIN STATES SHE WANTS PT DTR TO BE THE ONE TO HANDLE THAT PART. MARLIN STATES SHE WILL GET IN TOUCH WITH ISAIAH AND HAVE HER CALL SW. ATTEMPTED AGAIN TO CALL DTR BUT HAD TO LEAVE A MESSAGE.         Discharge Codes    No documentation.             MAYURI Cotto

## 2019-07-17 NOTE — DISCHARGE PLACEMENT REQUEST
"Referral for short term. Pt will need PATS arranged for radiation treatments. Pt needs 8 more XRT treatments. Pt family will be supplying chemo pill. Please call Mary at [phone] after referral reviewed.   Thanks!        Maryuri Lukasz Mary (54 y.o. Male)     Date of Birth Social Security Number Address Home Phone MRN    1965  [id number]  [place] [address] [phone] [id number]    [employer] Marital Status          [facility] Single       Admission Date Admission Type Admitting Provider Attending Provider Department, Room/Bed    7/15/19 Emergency Jairon Webb MD Fleming, John Eric, MD [facility] 3A, 329/1    Discharge Date Discharge Disposition Discharge Destination                       Attending Provider:  Jairon Webb MD    Allergies:  Lipitor [Atorvastatin], Lisinopril    Isolation:  None   Infection:  C.difficile (07/07/17)   Code Status:  CPR    Ht:  172.7 cm (68\")   Wt:  86.9 kg (191 lb 9.6 oz)    Admission Cmt:  None   Principal Problem:  Metabolic encephalopathy [G93.41]                 Active Insurance as of 7/15/2019     Primary Coverage     Payor Plan Insurance Group Employer/Plan Group    [place]      Payor Plan Address Payor Plan Phone Number Payor Plan Fax Number Effective Dates    [address] [address]   7/1/2017 - None Entered    [place] [address]       Subscriber Name Subscriber Birth Date Member ID       MARYURILUKASZ MARY 1965 [id number]                 Emergency Contacts      (Rel.) Home Phone Work Phone Mobile Phone    Ivonne Savage (Mother) [phone] -- --    JakiYvette (Daughter) [phone] -- [phone]            Insurance Information                [employer] Phone:     Subscriber: Lukasz Savage Mary Subscriber#: [id number]    Group#:  Precert#:              History & Physical      Jairon Webb MD at 7/15/2019  2:34 PM " "             [facility] Medicine Services  HISTORY AND PHYSICAL    Date of Admission: 7/15/2019  Primary Care Physician: Linda Hoffman, DNP, APRN    Subjective     Chief Complaint: lethargy, confusion    History of Present Illness    Mr. Savage is a 53 yo M with an inoperable anaplastic glioma of the brain, recurrent seizures on multiple AEDs, and persistent left-sided weakness.  Patient presented today for radiation treatment and was found to be confused, lethargic and worsening speech.  Patient has been sleeping a lot more lately.  Daughter and mother provide most of the history.  Patient has been very lethargic in the morning and often takes several hours to get up and \"get going\".  They have not been giving him the lactulose at home as he did have some diarrhea for a while and is currently having one BM per day.  Patients left sided weakness is better than last time I cared for him but is still significantly worse than his right-side.    Long-discussion with patient and family regarding goals of care.  They want him to be full code with CPR and intubation but do not want him to have a feeding tube if it gets to that point.  They are aware of his poor prognosis and the terminal condition of his disease.    They asked about his BP having a diastolic >100.  I discussed with them avoiding any other medications that may cause weakness, sedation or potential orthostasis or falls.  Would not worry about tight BP control given the significant of his cancer.      Review of Systems   Constitutional: Positive for activity change, appetite change and fatigue. Negative for chills, diaphoresis, fever and unexpected weight change.   Respiratory: Negative for cough and shortness of breath.    Cardiovascular: Negative for chest pain and palpitations.   Gastrointestinal: Negative for abdominal distention, abdominal pain, diarrhea, nausea and vomiting.   Neurological: Positive for weakness, " light-headedness and headaches.   All other systems reviewed and are negative.       Otherwise complete ROS reviewed and negative except as mentioned in the HPI.    Past Medical History:   Past Medical History:   Diagnosis Date   • Anemia 6/17/2019   • Angio-edema 7/3/2017   • Anxiety    • Arthritis    • Cancer (CMS/HCC) 05/09/2019    Brain cancer diagnoised yesterday  Dr Trevino    • Clostridium difficile colitis    • Erectile dysfunction 10/6/2016   • GERD (gastroesophageal reflux disease)    • Gout    • HCAP (healthcare-associated pneumonia) 7/11/2017   • Hyperlipidemia    • Malignant hypertension    • MSSA (methicillin susceptible Staphylococcus aureus) infection 7/31/2017   • Obstructive sleep apnea    • S/P shoulder surgery 7/27/2018   • Seizures (CMS/HCC) 7/4/2017     Past Surgical History:  Past Surgical History:   Procedure Laterality Date   • COLONOSCOPY     • CRANIOTOMY Right 4/15/2019    Procedure: CRANIOTOMY WITH BIOPSY RIGHT;  Surgeon: Dillon Trevino MD;  Location: [facility] OR;  Service: Neurosurgery   • SHOULDER ARTHROSCOPY Left    • TRACHEOSTOMY N/A 7/12/2017    Procedure: TRACHEOSTOMY WITH TRACHEOSCOPY;  Surgeon: Jairon Pierson MD;  Location: [facility] OR;  Service:      Social History:  reports that he quit smoking about 2 years ago. His smoking use included cigarettes. He has a 9.90 pack-year smoking history. He has never used smokeless tobacco. He reports that he does not drink alcohol or use drugs.    Family History: family history includes Diabetes in his maternal grandmother and mother; Heart attack in his paternal grandfather; Heart disease in his father; Hypertension in his father and mother; Kidney disease in his sister; No Known Problems in his daughter, maternal grandfather, paternal grandmother, and son.       Allergies:  Allergies   Allergen Reactions   • Lipitor [Atorvastatin] Rash   • Lisinopril Angioedema     Swell tongue     Medications:  Prior to Admission medications    Medication  Sig Start Date End Date Taking? Authorizing Provider   allopurinol (ZYLOPRIM) 300 MG tablet Take 300 mg by mouth Daily.    Provider, MD Phil   ALPRAZolam (XANAX) 0.25 MG tablet Take 1 tablet by mouth 2 (Two) Times a Day As Needed for Anxiety. 5/20/19   Linda Hoffman DNP, APRN   aspirin 81 MG tablet Take 1 tablet by mouth Daily. 2/28/19   Royal Todd PA   dexamethasone (DECADRON) 4 MG tablet Take 1 tablet by mouth 3 (Three) Times a Day With Meals. 6/25/19   Oral Jessica III, MD   divalproex (DEPAKOTE) 500 MG DR tablet Take 3 tablets by mouth Every 8 (Eight) Hours. 6/20/19   Jairon Webb MD   fenofibrate (TRICOR) 145 MG tablet Take 1 tablet by mouth Daily. 5/20/19   Linda Hoffman DNP, APRN   lacosamide (VIMPAT) 50 MG tablet tablet Take 3 tablets by mouth Every 12 (Twelve) Hours for 30 days. 6/20/19 7/20/19  Jairon Webb MD   lactulose 20 GM/30ML solution solution Take 30 mL by mouth Daily.  Patient taking differently: Take 20 g by mouth As Needed. 7/3/19   Jairon Perez DO   levETIRAcetam (KEPPRA) 500 MG tablet Take 2.5 tablets by mouth 2 (Two) Times a Day. 6/20/19   Jairon Webb MD   losartan (COZAAR) 25 MG tablet Take 1 tablet by mouth Daily. 7/11/19   Malia Vargas MD   Omega-3 Fatty Acids (FISH OIL) 1000 MG capsule capsule Take 2 capsules by mouth Daily With Breakfast. 5/20/19   Linda Hoffman DNP, APRN   omeprazole (PRILOSEC) 10 MG capsule Take 1 capsule by mouth Daily. For acid reflux 5/20/19   Linda Hoffman DNP, APRN   oxyCODONE-acetaminophen (PERCOCET) 7.5-325 MG per tablet Take 1 tablet by mouth Every 4 (Four) Hours As Needed for Moderate Pain  or Severe Pain . 6/25/19   Oral Jessica III, MD   pregabalin (LYRICA) 100 MG capsule Take 1 capsule by mouth Every 12 (Twelve) Hours. 7/3/19   Jairon Perez,    promethazine (PHENERGAN) 12.5 MG tablet Take 12.5 mg by mouth Every 4 (Four) Hours As Needed for Nausea or Vomiting.     "ProviderPhil MD   QUEtiapine (SEROquel) 50 MG tablet Take 1 tablet by mouth Every Night. 6/20/19   Jairon Webb MD   sulfamethoxazole-trimethoprim (BACTRIM DS,SEPTRA DS) 800-160 MG per tablet Take 1 tablet by mouth 3 (Three) Times a Week. Monday, Wednesday, and Friday.    Phil Constantino MD   TEMOZOLOMIDE PO Take 150 mg by mouth Daily. 140 mg + 10 mg daily.    Phil Constantino MD   venlafaxine XR (EFFEXOR-XR) 150 MG 24 hr capsule Take 1 capsule by mouth Daily. 5/20/19   Linda Hoffman, DNP, APRN   vitamin B-6 (PYRIDOXINE) 100 MG tablet TAKE 1 TABLET BY MOUTH DAILY. 7/10/19   Royal Todd PA     Objective     Vital Signs: /91   Pulse 79   Temp 97.7 °F (36.5 °C) (Oral)   Resp 12   Ht 172.7 cm (68\")   Wt 80.7 kg (178 lb)   SpO2 98%   BMI 27.06 kg/m²    Physical Exam   Constitutional: No distress.   HENT:   Head: Normocephalic and atraumatic.   Eyes: Conjunctivae are normal. No scleral icterus.   Neck: Neck supple. No JVD present.   Cardiovascular: Normal rate and regular rhythm. Exam reveals no gallop and no friction rub.   No murmur heard.  Pulmonary/Chest: Effort normal and breath sounds normal. No stridor. No respiratory distress. He has no wheezes.   Abdominal: Soft. Bowel sounds are normal. He exhibits no distension. There is no tenderness. There is no guarding.   Musculoskeletal: He exhibits no edema.   Neurological:   Awakens to voice and tactile stimuli but goes right back to sleep; weakness on left upper and lower extremity; normal strength on the right side   Skin: Skin is warm and dry. He is not diaphoretic. No erythema.   Psychiatric: He has a normal mood and affect. His behavior is normal.   Lethargic   Nursing note and vitals reviewed.          Results Reviewed:  Lab Results (last 24 hours)     Procedure Component Value Units Date/Time    Comprehensive Metabolic Panel [[id number]]  (Abnormal) Collected:  07/15/19 1225    Specimen:  Blood Updated:  " 07/15/19 1326     Glucose 71 mg/dL      BUN 37 mg/dL      Creatinine 1.10 mg/dL      Sodium 140 mmol/L      Potassium 4.7 mmol/L      Chloride 102 mmol/L      CO2 29.0 mmol/L      Calcium 8.9 mg/dL      Total Protein 5.9 g/dL      Albumin 3.00 g/dL      ALT (SGPT) 61 U/L      AST (SGOT) 46 U/L      Alkaline Phosphatase 62 U/L      Total Bilirubin 0.4 mg/dL      eGFR Non African Amer 70 mL/min/1.73      Globulin 2.9 gm/dL      A/G Ratio 1.0 g/dL      BUN/Creatinine Ratio 33.6     Anion Gap 9.0 mmol/L     Narrative:       GFR Normal >60  Chronic Kidney Disease <60  Kidney Failure <15    CBC & Differential [[id number]] Collected:  07/15/19 1225    Specimen:  Blood Updated:  07/15/19 1254    Narrative:       The following orders were created for panel order CBC & Differential.  Procedure                               Abnormality         Status                     ---------                               -----------         ------                     CBC Auto Differential[[id number]]        Abnormal            Final result                 Please view results for these tests on the individual orders.    CBC Auto Differential [[id number]]  (Abnormal) Collected:  07/15/19 1225    Specimen:  Blood Updated:  07/15/19 1254     WBC 8.81 10*3/mm3      RBC 4.96 10*6/mm3      Hemoglobin 14.5 g/dL      Hematocrit 45.1 %      MCV 90.9 fL      MCH 29.2 pg      MCHC 32.2 g/dL      RDW 17.0 %      RDW-SD 55.3 fl      MPV 11.1 fL      Platelets 124 10*3/mm3      Neutrophil % 81.0 %      Lymphocyte % 13.7 %      Monocyte % 3.5 %      Eosinophil % 0.0 %      Basophil % 0.2 %      Neutrophils, Absolute 7.13 10*3/mm3      Lymphocytes, Absolute 1.21 10*3/mm3      Monocytes, Absolute 0.31 10*3/mm3      Eosinophils, Absolute 0.00 10*3/mm3      Basophils, Absolute 0.02 10*3/mm3     Valproic Acid Level, Total [[id number]]  (Abnormal) Collected:  07/15/19 1103    Specimen:  Blood Updated:  07/15/19 1203     Valproic Acid 140.2 mcg/mL         Imaging  Results (last 24 hours)     Procedure Component Value Units Date/Time    CT Head Without Contrast [[id number]] Collected:  07/15/19 1055     Updated:  07/15/19 1102    Narrative:       EXAMINATION: CT HEAD WO CONTRAST- 7/15/2019 10:55 AM CDT     HISTORY: Fatigue     COMPARISON: CT head without contrast 06/29/2019     DOSE: 672 mGy-cm     TECHNIQUE: Sequential imaging was performed from the vertex through the  base of the skull without the use of IV contrast.  Sagittal and coronal  reformations were made from the original source data and reviewed.  Automated exposure control was also utilized to decrease patient  radiation dose.     FINDINGS:   There is redemonstration of a hyperdense lesion in the parafalcine left  frontal lobe which measures up to 3.2 cm in size, stable compared to the  previous exam. There is a right craniotomy defect with hyperdense  material noted in the brain parenchyma just deep to this, also stable  from the prior exam. There is no evidence of new intracranial  hemorrhage. There is no evidence of acute territorial infarct.  Ventricles appear normal in configuration. The basilar cisterns are  patent. Posterior fossa appears grossly normal. Paranasal sinuses and  mastoid air cells appear clear. Of          Impression:       Stable appearance of the brain compared to the exam from  06/29/2019, with hyperdense masses in the right frontal lobe without  associated midline shift. Right craniotomy defect is noted with a  hyperdense area just deep to it, also stable from the previous exam.  This may represent an additional mass. No acute intracranial findings  are appreciated.     This report was finalized on 07/15/2019 10:58 by Dr. Driss Ward MD.        I have personally reviewed and interpreted the radiology studies and ECG obtained at time of admission.     Assessment / Plan     Assessment:   Active Hospital Problems    Diagnosis   • **Metabolic encephalopathy   • Valproic acid toxicity,  possible    • Left-sided weakness   • Hyperammonemia (CMS/HCC)   • Inoperable anaplastic glioma of brain    • Elevated transaminase level   • Anemia   • Seizures (CMS/HCC)         Plan:   1.  Admit to medicine  2.  Initially was going to give IV loading of carnitine, however, as mental status improved to what appears to me to be close to his new baseline, will hold off on aggressive carnitine treatment.  Will however give the patient 500 mg BID of carnitine as he likely is deficient  3.  Neurology consultation  4.  Oncology consultation  5.  Social work for assistance with placement  6.  Dexamethasone 2 mg q6h  7.  AEDs per neurology, resumed all except depakote for now  8.  Resume lactulose  9.  IVF   10.  SLP    Long goals of care discussion had with family.        Code Status: Full code    Daughter and mother to make decisions if patient unable     I discussed the patient's findings and my recommendations with the Dr. Simmons and family    Estimated length of stay ?    Jairon Webb MD   07/15/19   2:34 PM            Electronically signed by Jairon Webb MD at 7/15/2019 10:04 PM       Operative/Procedure Notes (last 7 days) (Notes from 7/10/2019  3:16 PM through 7/17/2019  3:16 PM)     No notes of this type exist for this encounter.           Physician Progress Notes (last 24 hours) (Notes from 7/16/2019  3:16 PM through 7/17/2019  3:16 PM)      Timmy Pratt PA at 7/17/2019  6:24 AM     Attestation signed by Luis Miguel Doyle MD at 7/17/2019  8:38 AM    I have seen, examined and reviewed this patient medication list, appropriate labs and imaging studies. I reviewed relevant medical records and others physician's notes. I discussed the plans of care with the patient. I answered all the questions to the patient's satisfaction  (Please note that portions of this note were completed with a voice recognition program. Efforts were made to edit the dictations but occasionally words are  mis-transcribed.)  He is quite alert this morning.Alert and oriented.  He received radiation yesterday.  Okay to be discharged from a standpoint when medical stable.                                 MEDICAL ONCOLOGY PROGRESS NOTE  Patient name: Lukasz Savage  Patient : 1965  VISIT # [id number]  MR #[id number]   Room 329    SUBJECTIVE: Discussed with his mother.  He was more lethargic yesterday afternoon, but that was after he received Depakote and Lyrica yesterday.  He awakened during the evening and is again awake this morning.  He did get radiation yesterday.    INTERVAL HISTORY:  Mr. 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  He is completed 20 of 30 planned XRT treatments and was brought to radiation therapy yesterday in anticipation of dose #21 but reported that he had been more lethargic for 2 to 3 days.  He was taken to the emergency room for evaluation.  His mother reports that he has had no significant, obvious seizure activity but has had some trembling of the right upper extremity at times.     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 Dolye on 2019 referred by Dr. Trevino for diagnosis of primary brain tumor, grade 3 astrocytoma. Lukasz was being seen by neurology with complaints of left facial and upper extremity.  · 2019-MRI brain with contrast showed changes in the frontal convexity with a fullness of the sulcal gyral pattern. Specifically, 4.5 x 4.5 x 3.5 cm right frontal lesion. Second, discrete hyperintense nodule measuring 1.1 x 1.9 x 1.5 cm in the  subcortical white matter of the paramedian posterior right frontal lobe immediately above the corpus callosum. This was concerning for high-grade glioma.   · 4/15/2019-he underwent a craniotomy with stereotactic biopsy by Dr. Dillon Trevino at [facility]. 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 Further molecular analysis at [facility] revealed IDH1/2 wild type, MGMT non-methylated, TERT mutation. Complex cytogenetics changes A maximum safe resection was not possible due to concerns of significant sequela. Second opinion was recommended at the [facility].   · 5/17/2019-he was seen by Dr. Oral Jessica. Recommended concurrent chemoradiation.   · 5/24/2019-initial oncology consultation, Dr. Doyle recommended concurrent chemoradiation with Temodar.   · 6/3/2019. CT scan of the head without contrast documented to ill-defined masslike areas of increased attenuation within the right frontal lobe. Largest measuring 3.3 cm, previously measuring 1.8 cm and second lesion in the lateral aspect of the right frontal lobe measuring up to 1.5 cm. No intracranial hemorrhage or midline shift.   · 6/3/2019- MRI of the brain with and without contrast, compared to 4/2/2019 documented masslike appearance at the right frontal lobe measuring 2.9 x 1.4 cm, previously measuring 1.7 x 1.1 cm with mass effect and possible extension into the corpus callosum. No midline shift.   · 6/14/2019-CT scan cervical spine without contrast documented no evidence of acute bony injury. Multilevel disc degeneration spondylosis.   · 6/12/2019- Initiated Temodar along with radiation therapy   · 6/17/2019- MRI of the brain with and without contrast documented 3 cm enhancing, partially necrotic tumor in the both the right corpus callosum body, consistent with known astrocytoma. Additional FLAIR signal on both sides of the right central sulcus with faint contrast enhancement in the precentral gyrus, compatible  with tumor extension. No hemorrhage or brain herniation.   · 6/21/2019 - CT scan of the head without contrast hypodense focus of the right frontal lobe measuring 3.3 cm. No intracranial hemorrhage. No abnormal extra-axial fluid collection. Right frontal craniotomy changes.         OBJECTIVE:    REVIEW OF SYSTEMS  CONSTITUTIONAL: no fever, no night sweats, no fatigue;  HEENT: no blurring of vision, no double vision, no hearing difficulty, no tinnitus, no ulceration, no dental caries, and no dysplasia, no epistaxis;  LUNGS: no cough, no hemoptysis, no wheeze, no shortness of breath;  CARDIOVASCULAR: no palpitation, no chest pain, no shortness of breath;  GI: no abdominal pain, no nausea, no vomiting, no diarrhea, no constipation;  DIPAK: no dysuria, no hematuria, no frequency or urgency, no nephrolithiasis;  MUSCULOSKELETAL: no joint pain, no swelling, no stiffness;  ENDOCRINE: no polyuria, no polydipsia, no cold or heat intolerance;  HEMATOLOGY: no easy bruising or bleeding, no history of clotting disorder;  DERMATOLOGY: no skin rash, no eczema, no pruritus;  PSYCHIATRY: no depression, no anxiety, no panic attacks, no suicidal ideation, no homicidal ideation;  NEUROLOGY: no syncope, no seizures, no numbness or tingling of hands, no numbness or tingling of feet, no paresis;      Vitals:    07/17/19 0519   BP: 127/88   Pulse: 70   Resp: 18   Temp: 97.7 °F (36.5 °C)   SpO2: 97%       Intake/Output Summary (Last 24 hours) at 7/17/2019 0624  Last data filed at 7/16/2019 2200  Gross per 24 hour   Intake 440 ml   Output --   Net 440 ml       PHYSICAL EXAM:   CONSTITUTIONAL: Alert, appropriate, no acute distress, wearing CPAP  EYES: Non icteric, EOM intact, pupils equal round   ENT: Mucus membranes moist, no oral pharyngeal lesions, external inspection of ears and nose are normal  NECK: Supple, no masses.  No palpable thyroid mass  CHEST/LUNGS: CTA bilaterally, normal respiratory effort   CARDIOVASCULAR: RRR, no murmurs.  No  lower extremity edema  ABDOMEN: soft non-tender, active bowel sounds, no HSM.  No palpable masses  EXTREMITIES: warm, full ROM in all 4 extremities, no focal weakness.  SKIN: warm, dry with no rashes or lesions  LYMPH: No cervical, clavicular, axillary, or inguinal lymphadenopathy  NEUROLOGIC: follows commands, non focal   PSYCH: mood and affect appropriate.  Alert and oriented to time, place, person    CBC  Results from last 7 days   Lab Units 07/16/19  0510 07/15/19  1225   WBC 10*3/mm3 9.92 8.81   HEMOGLOBIN g/dL 13.7* 14.5   HEMATOCRIT % 42.2 45.1   PLATELETS 10*3/mm3 112* 124*         Lab Results   Component Value Date     07/16/2019    K 4.9 07/16/2019     07/16/2019    CO2 31.0 07/16/2019    BUN 40 (H) 07/16/2019    CREATININE 1.14 07/16/2019    GLUCOSE 74 07/16/2019    CALCIUM 9.2 07/16/2019    BILITOT 0.7 07/16/2019    ALKPHOS 53 07/16/2019    AST 34 07/16/2019    ALT 61 (H) 07/16/2019    AGRATIO 1.1 07/16/2019    GLOB 2.7 07/16/2019       Lab Results   Component Value Date    INR 0.94 06/21/2019    INR 0.98 06/03/2019    INR 0.94 03/22/2018    PROTIME 12.8 06/21/2019    PROTIME 13.3 06/03/2019    PROTIME 12.8 03/22/2018       Cultures:    Lab Results   Component Value Date    BLOODCX No growth at 5 days 06/15/2019     No components found for: URINCX    ASSESSMENT/PLAN:  CT HEAD WO CONTRAST- 7/15/2019 10:55 AM CDT     HISTORY: Fatigue     COMPARISON: CT head without contrast 06/29/2019     DOSE: 672 mGy-cm     TECHNIQUE: Sequential imaging was performed from the vertex through the  base of the skull without the use of IV contrast.  Sagittal and coronal  reformations were made from the original source data and reviewed.  Automated exposure control was also utilized to decrease patient  radiation dose.     FINDINGS:   There is redemonstration of a hyperdense lesion in the parafalcine left  frontal lobe which measures up to 3.2 cm in size, stable compared to the  previous exam. There is a right  craniotomy defect with hyperdense  material noted in the brain parenchyma just deep to this, also stable  from the prior exam. There is no evidence of new intracranial  hemorrhage. There is no evidence of acute territorial infarct.  Ventricles appear normal in configuration. The basilar cisterns are  patent. Posterior fossa appears grossly normal. Paranasal sinuses and  mastoid air cells appear clear.        IMPRESSION:  Stable appearance of the brain compared to the exam from  06/29/2019, with hyperdense masses in the right frontal lobe without  associated midline shift. Right craniotomy defect is noted with a  hyperdense area just deep to it, also stable from the previous exam.  This may represent an additional mass. No acute intracranial findings  are appreciated.     This report was finalized on 07/15/2019 10:58 by Dr. Driss Ward MD.     ASSESSMENT/PLAN:     Anaplastic grade 3 glioma     XRT to the brain -completed 21 of 30 planned thus far.  #22 should be given today.  Temodar 150 mg daily M-F with XRT -  he did get his dose yesterday and will continue while getting XRT     CT head stable     CBC 7/16/2019  WBC 9.92  Hgb 13.7  ,000     Progressive lethargy     Valproic acid level 140.2 on admission 7/15/2019  -  24.1  on 7/16/2019 am     More lethargic yesterday after Depakote and Lyrica given per his mother.     Antiseizure medication  Keppra 1250 mg IV every 12 hours  Lyrica 75 p.o. every 12 hours  Depacon 1250 mg IV every 8 hours    Per Dr. Keith Clark with medical oncology to discharge when stable medically.  Outpatient follow-up as previously arranged next week.      YEVGENIY Hammond    07/17/19  6:24 AM          Electronically signed by Luis Miguel Doyle MD at 7/17/2019  8:38 AM     Jairon Webb MD at 7/16/2019  3:33 PM              [facility] Medicine Services  INPATIENT PROGRESS NOTE    Patient Name: Lukasz Savage  Date of Admission:  7/15/2019  Today's Date: 07/16/19  Length of Stay: 1  Primary Care Physician: Malia Vargas MD    Subjective   Chief Complaint: lethargy  HPI     Patient seen and examined at bedside.  Family at bedside.  Patient very lethargic today, has woken up to eat and little else today.  Neurology ordered CPAP last night but it was not started.  Discussed with RT Yong who has brought the patient a CPAP and placed it on the patient.  He has woken up and answering questions, mostly appropriate.      Family asked about potentially taking the patient home with home health if he gets better.  I told them I felt as though if they went home with home health that they would like be back in the hospital within the week.  I told them his mother and daughter are in no shape to be tugging and pulling on him in order to get him up and in the car for radiation.  Which the mother indicated is what they have been having to do, especially yesterday.          Review of Systems   Unable to perform ROS: Mental status change     ROS not obtained form patient as he is minimally responsive.  Per family has no complaints and is still remaining lethargic.      All pertinent negatives and positives are as above. All other systems have been reviewed and are negative unless otherwise stated.     Objective    Temp:  [97 °F (36.1 °C)-98.3 °F (36.8 °C)] 98 °F (36.7 °C)  Heart Rate:  [58-87] 66  Resp:  [16-18] 16  BP: (119-144)/() 133/95  Physical Exam  Constitutional: No distress. Placed on CPAP, family at bedside.   HENT:   Head: Normocephalic and atraumatic.   Eyes: Conjunctivae are normal. No scleral icterus.   Neck: Neck supple. No JVD present.   Cardiovascular: Normal rate and regular rhythm. Exam reveals no gallop and no friction rub.   No murmur heard.  Pulmonary/Chest: Effort normal and breath sounds normal. No stridor. No respiratory distress. He has no wheezes.   Abdominal: Soft. Bowel sounds are normal. He exhibits no distension.  There is no tenderness. There is no guarding.   Musculoskeletal: He exhibits no edema.   Neurological:   Awakens to voice and tactile stimuli but goes right back to sleep; weakness on left upper and lower extremity; normal strength on the right side  Skin: Skin is warm and dry. He is not diaphoretic. No erythema.   Psychiatric: He has a normal mood and affect. His behavior is normal.   Lethargic   Nursing note and vitals reviewed.             Results Review:  I have reviewed the labs, radiology results, and diagnostic studies.    Laboratory Data:   Results from last 7 days   Lab Units 07/16/19  0510 07/15/19  1225   WBC 10*3/mm3 9.92 8.81   HEMOGLOBIN g/dL 13.7* 14.5   HEMATOCRIT % 42.2 45.1   PLATELETS 10*3/mm3 112* 124*        Results from last 7 days   Lab Units 07/16/19  0510 07/15/19  1225 07/11/19  0932   SODIUM mmol/L 141 140 144   POTASSIUM mmol/L 4.9 4.7 6.0*   CHLORIDE mmol/L 104 102 102   TOTAL CO2 mmol/L  --   --  29.0   CO2 mmol/L 31.0 29.0  --    BUN mg/dL 40* 37* 32*   CREATININE mg/dL 1.14 1.10 1.22   CALCIUM mg/dL 9.2 8.9 9.2   BILIRUBIN mg/dL 0.7 0.4 0.3   ALK PHOS U/L 53 62 55   ALT (SGPT) U/L 61* 61* 56*   AST (SGOT) U/L 34 46* 26   GLUCOSE mg/dL 74 71  --        Culture Data:        Radiology Data:   Imaging Results (last 24 hours)     ** No results found for the last 24 hours. **          I have reviewed the patient's current medications.     Assessment/Plan     Active Hospital Problems    Diagnosis   • **Metabolic encephalopathy   • Valproic acid toxicity, possible    • Left-sided weakness   • Hyperammonemia (CMS/HCC)   • Inoperable anaplastic glioma of brain    • Elevated transaminase level   • Anemia   • Seizures (CMS/HCC)       Plan:  1.  Continue to monitor on telemetry  2.  Continue carnitine  3.  Case discussed at length with Dr. Simmons   4.  CPAP at night  5.  Continue dexamethasone 2 mg q6h for now, will begin to taper tomorrow  6.  PO lactulose  7.  SW for placement  8.   SLP  9.  AEDs per neurology  10.  Oncology consulted.  Patient has 10 radiation treatments left   11.  AM VPA level, CMP                 Discharge Planning: I expect the patient to be discharged to ? in ? days.    Jairon Webb MD   19   3:33 PM    Electronically signed by Jairon Webb MD at 2019  3:59 PM       Consult Notes (last 24 hours) (Notes from 2019  3:16 PM through 2019  3:16 PM)     No notes of this type exist for this encounter.           Physical Therapy Notes (last 24 hours) (Notes from 2019  3:16 PM through 2019  3:16 PM)      Brandon Phillips, PT Student at 2019 11:06 AM  Version 1 of 1         Problem: Patient Care Overview  Goal: Plan of Care Review   19 1051   Coping/Psychosocial   Plan of Care Reviewed With patient   Plan of Care Review   Progress improving   OTHER   Outcome Summary PT tx completed. Pt able to walk with improved stability with RW but still has trouble with dual tasking and head movements keeping up the same pace. He fatigued after walking and declined to perform stairs stating he felt like he couldn't do it. He continues to present with impaired activity tolerance and balance, and decreased strength with mobility. He would continue to benefit from skilled therapy to work on balance with OOB activities to reduce fall risk and improve endurance to reduce fatigue. Continue with PT POC and d/c to home with home health and assist or inpatient rehab.            Electronically signed by Silvina Grover, PT, DPT, NCS at 2019 11:07 AM     Brandon Phillips, PT Student at 2019 11:07 AM  Version 1 of 1         Acute Care - Physical Therapy Treatment Note  [facility]     Patient Name: Lukasz Savage  : 1965  MRN: [id number]  Today's Date: 2019  Onset of Illness/Injury or Date of Surgery: 07/15/19  Date of Referral to PT: 07/15/19  Referring Physician: Dr. Webb    Admit Date: 7/15/2019    Visit Dx:    ICD-10-CM ICD-9-CM   1.  Altered mental status, unspecified altered mental status type R41.82 780.97   2. Valproic acid toxicity, accidental or unintentional, initial encounter T42.6X1A 966.3     E855.0   3. Impaired functional mobility, balance, gait, and endurance Z74.09 V49.89   4. Dysphagia, unspecified type R13.10 787.20   5. Impaired mobility and ADLs Z74.09 799.89     Patient Active Problem List   Diagnosis   • HTN (hypertension)   • Seizures (CMS/HCC)   • Anemia   • Hypoglycemia   • Left hemiparesis (CMS/HCC)   • Elevated transaminase level   • Inoperable anaplastic glioma of brain    • Hyperammonemia (CMS/HCC)   • Altered mental status   • Metabolic encephalopathy   • Valproic acid toxicity, possible    • Left-sided weakness       Therapy Treatment    Rehabilitation Treatment Summary     Row Name 07/17/19 1041 07/17/19 0930          Treatment Time/Intention    Discipline  physical therapist  -MS,AL,MS2  occupational therapy assistant  -TS     Document Type  --  therapy note (daily note)  -TS     Subjective Information  --  no complaints  (Pended)   -TS2     Patient/Family Observations  Mother present  -MS,AL,MS2  --     Care Plan Review  evaluation/treatment results reviewed;care plan/treatment goals reviewed;risks/benefits reviewed;patient/other agree to care plan  -MS,AL,MS2  --     Existing Precautions/Restrictions  fall  -MS,AL,MS2  fall  (Pended)   -TS2     Recorded by [MS,AL,MS2] Silvina Grover, PT, DPT, NCS (r) Brandon Phillips, PT Student (t) Silvina Grover PT, DPT, NCS (c) 07/17/19 1107 [TS] Enma Ellis, AGUILAR/L 07/17/19 0931  [TS2] Enma Ellis, AGUILAR/L 07/17/19 1106     Row Name 07/17/19 1041             Cognitive Assessment/Intervention    Additional Documentation  Cognitive Assessment/Intervention (Group)  -MS,AL,MS2      Recorded by [MS,AL,MS2] Silvina Grover, PT, DPT, NCS (r) Brandon Phillips, PT Student (t) Silvina Grover PT, DPT, NCS (c) 07/17/19 1107      Row Name 07/17/19 1041 07/17/19 0930           Cognitive Assessment/Intervention- PT/OT    Affect/Mental Status (Cognitive)  flat/blunted affect;low arousal/lethargic  -MS,AL,MS2  --     Orientation Status (Cognition)  oriented x 3  -MS,AL,MS2  --     Follows Commands (Cognition)  follows two step commands;75-90% accuracy  -MS,AL,MS2  --     Cognitive Function (Cognitive)  safety deficit  -MS,AL,MS2  --     Safety Deficit (Cognitive)  mild deficit  -MS,AL,MS2  --     Personal Safety Interventions  fall prevention program maintained;gait belt;nonskid shoes/slippers when out of bed;supervised activity  -MS,AL,MS2  fall prevention program maintained;gait belt;nonskid shoes/slippers when out of bed  (Pended)   -TS     Recorded by [MS,AL,MS2] Silvina Grover, PT, DPT, NCS (r) Brandon Phillips, PT Student (t) Silvina Grover PT, DPT, NCS (c) 07/17/19 1107 [TS] Enma Ellis COTA/L 07/17/19 1106     Row Name 07/17/19 1041 07/17/19 0930          Bed Mobility Assessment/Treatment    Bed Mobility Assessment/Treatment  sit-supine;scooting/bridging  -MS,AL,MS2  supine-sit  (Pended)   -TS     Scooting/Bridging [place] (Bed Mobility)  supervision  -MS,AL,MS2  --     Supine-Sit [place] (Bed Mobility)  --  supervision  (Pended)   -TS     Sit-Supine [place] (Bed Mobility)  supervision;verbal cues  -MS,AL,MS2  --     Assistive Device (Bed Mobility)  --  draw sheet;head of bed elevated  (Pended)   -TS     Recorded by [MS,AL,MS2] Silvina Grover, PT, DPT, NCS (r) Brandon Phillips, PT Student (t) Silvina rGover, PT, DPT, NCS (c) 07/17/19 1107 [TS] Enma Ellis AGUILAR/L 07/17/19 1106     Row Name 07/17/19 0930             Functional Mobility    Functional Mobility- Ind. Level  contact guard assist  (Pended)   -TS      Functional Mobility- Device  rolling walker  (Pended)   -TS      Functional Mobility- Comment  in room, in BR  (Pended)   -TS      Recorded by [TS] Enam Ellis, JEFF/L 07/17/19 1106      Row Name 07/17/19 1041 07/17/19 0930          Transfer  Assessment/Treatment    Transfer Assessment/Treatment  sit-stand transfer;stand-sit transfer  -MS,AL,MS2  sit-stand transfer;stand-sit transfer;toilet transfer  (Pended)   -TS     Comment (Transfers)  Cues needed to push up from chair/bed  -MS,AL,MS2  --     Recorded by [MS,AL,MS2] Silvina Grover, PT, DPT, NCS (r) Brandon Phillips, PT Student (t) Silvina Grover, PT, DPT, NCS (c) 07/17/19 1107 [TS] Rhonda Enma N, AGUILAR/L 07/17/19 1106     Row Name 07/17/19 1041             Sit-Stand Transfer    Sit-Stand [place] (Transfers)  supervision;verbal cues  -MS,AL,MS2      Assistive Device (Sit-Stand Transfers)  walker, front-wheeled  -MS,AL,MS2      Recorded by [MS,AL,MS2] Silvina Grover, PT, DPT, NCS (r) Brandon Phillips, PT Student (t) Silvina Grover, PT, DPT, NCS (c) 07/17/19 1107      Row Name 07/17/19 1041             Stand-Sit Transfer    Stand-Sit [place] (Transfers)  supervision;verbal cues  -MS,AL,MS2      Assistive Device (Stand-Sit Transfers)  walker, front-wheeled  -MS,AL,MS2      Recorded by [MS,AL,MS2] Silvina Grover, PT, DPT, NCS (r) Brandon Phillips, PT Student (t) Silvina Grover, PT, DPT, NCS (c) 07/17/19 1107      Row Name 07/17/19 1041             Gait/Stairs Assessment/Training    [place] Level (Gait)  contact guard  -MS,AL,MS2      Assistive Device (Gait)  walker, front-wheeled  -MS,AL,MS2      Distance in Feet (Gait)  Pt ambulated 120ft with CGA with decreased gait speed, improved lateral stability but still unstable when head movements were added needing CGA. Pt also decreased pace when looking in different directions.   -MS,AL,MS2      Pattern (Gait)  2-point  -MS,AL,MS2      Deviations/Abnormal Patterns (Gait)  base of support, wide;mile decreased;gait speed decreased  -MS,AL,MS2      Comment (Gait/Stairs)  Pt stated he felt to weak to try to perform steps today  -MS,AL,MS2      Recorded by [MS,AL,MS2] Silvina Grover, PT, DPT, NCS (r) Brandon Phillips, PT Student (t) Silvina Grover, PT, DPT, NCS  (c) 07/17/19 1107      Row Name 07/17/19 1041             Motor Skills Assessment/Interventions    Additional Documentation  Balance (Group)  -MS,AL,MS2      Recorded by [MS,AL,MS2] Silvina Grover, PT, DPT, NCS (r) Brandon Phillips, PT Student (t) Silvina Grover, PT, DPT, NCS (c) 07/17/19 1107      Row Name 07/17/19 1041             Balance    Balance  static standing balance  -MS,AL,MS2      Recorded by [MS,AL,MS2] Silvina Grover, PT, DPT, NCS (r) Brandon Phillips, PT Student (t) Silvina Grover, PT, DPT, NCS (c) 07/17/19 1107      Row Name 07/17/19 1041             Static Standing Balance    Level of [place] (Unsupported Standing, Static Balance)  minimal assist, 75% patient effort  -MS,AL,MS2      Time Able to Maintain Position (Unsupported Standing, Static Balance)  30 to 45 seconds  -MS,AL,MS2      Comment (Unsupported Standing, Static Balance)  Pt able to perform static standing with feet hip width apart with CGA with slight ant/post sway. Pt had increased sway with EC for 10 seconds but no LOB. He was able to stand feet together and modified tandem with min A for foot placement and lateral sway. Unable to keep balance for >10secs with feet together and EC  -MS,AL,MS2      Recorded by [MS,AL,MS2] Silvina Grover, PT, DPT, NCS (r) Brandon Phillips, PT Student (t) Silvina Grover PT, DPT, NCS (c) 07/17/19 1107      Row Name 07/17/19 1041             Positioning and Restraints    Pre-Treatment Position  sitting in chair/recliner  -MS,AL,MS2      Post Treatment Position  bed  -MS,AL,MS2      In Bed  supine;call light within reach;encouraged to call for assist;exit alarm on;with family/caregiver;side rails up x2  -MS,AL,MS2      Recorded by [MS,AL,MS2] Silvina Grover, PT, DPT, NCS (r) Brandon Phillips, PT Student (t) Silvina Grover, PT, DPT, NCS (c) 07/17/19 1107      Row Name 07/17/19 1041             Pain Assessment    Additional Documentation  Pain Scale: Numbers Pre/Post-Treatment (Group)  -MS,AL,MS2      Recorded by  [MS,AL,MS2] Silvina Grover, PT, DPT, NCS (r) Brandon Phillips, PT Student (t) Silvina Grover, PT, DPT, NCS (c) 07/17/19 1107      Row Name 07/17/19 1041             Pain Scale: Numbers Pre/Post-Treatment    Pain Scale: Numbers, Pretreatment  0/10 - no pain  -MS,AL,MS2      Pain Scale: Numbers, Post-Treatment  0/10 - no pain  -MS,AL,MS2      Recorded by [MS,AL,MS2] Silvina Grover, PT, DPT, NCS (r) Brandon Phillips, PT Student (t) Silvina Grover, PT, DPT, NCS (c) 07/17/19 1107      Row Name 07/17/19 1041             Plan of Care Review    Plan of Care Reviewed With  patient  -MS,AL,MS2      Recorded by [MS,AL,MS2] Silvina Grover, PT, DPT, NCS (r) Brandon Phillips, PT Student (t) Silvina Grover, PT, DPT, NCS (c) 07/17/19 1107      Row Name 07/17/19 1041             Outcome Summary/Treatment Plan (PT)    Daily Summary of Progress (PT)  progress towards functional goals is fair  -MS,AL,MS2      Anticipated Discharge Disposition (PT)  home with assist;home with home health;inpatient rehabilitation facility  -MS,AL,MS2      Recorded by [MS,AL,MS2] Silvina Grover, PT, DPT, NCS (r) Brandon Phillips, PT Student (t) Silvina Grover PT, DPT, NCS (c) 07/17/19 1107        User Key  (r) = Recorded By, (t) = Taken By, (c) = Cosigned By    Initials Name Effective Dates Discipline    TS Enma Ellis, AGUILAR/L 08/02/16 -  OT    Silvina Jarrell, PT, DPT, NCS 06/19/18 -  PT    Brandon Silver, PT Student 04/24/19 -  PT                   Physical Therapy Education     Title: PT OT SLP Therapies (In Progress)     Topic: Physical Therapy (In Progress)     Point: Precautions (Done)     Learning Progress Summary           Patient Acceptance, E VU by AL at 7/16/2019  8:54 AM    Comment:  PT educated patient on fall risk and needing assist from staff to get out of bed.                               User Key     Initials Effective Dates Name Provider Type Discipline    AL 04/24/19 -  Brandon Phillips, PT Student PT Student PT                PT Recommendation and  Plan  Anticipated Discharge Disposition (PT): home with assist, home with home health, inpatient rehabilitation facility  Planned Therapy Interventions (PT Eval): balance training, bed mobility training, gait training, strengthening, stair training, transfer training  Therapy Frequency (PT Clinical Impression): 2 times/day  Outcome Summary/Treatment Plan (PT)  Daily Summary of Progress (PT): progress towards functional goals is fair  Anticipated Discharge Disposition (PT): home with assist, home with home health, inpatient rehabilitation facility  Plan of Care Reviewed With: patient  Progress: improving  Outcome Summary: PT tx completed. Pt able to walk with improved stability with RW but still has trouble with dual tasking and head movements keeping up the same pace. He fatigued after walking and declined to perform stairs stating he felt like he couldn't do it. He continues to present with impaired activity tolerance and balance, and decreased strength with mobility. He would continue to benefit from skilled therapy to work on balance with OOB activities to reduce fall risk and improve endurance to reduce fatigue. Continue with PT POC and d/c to home with home health and assist or inpatient rehab.   Outcome Measures     Row Name 07/17/19 1000 07/16/19 0800 07/16/19 0750       How much help from another person do you currently need...    Turning from your back to your side while in flat bed without using bedrails?  4  -MS (r) AL (t) MS (c)  3  -MS (r) AL (t) MS (c)  --    Moving from lying on back to sitting on the side of a flat bed without bedrails?  3  -MS (r) AL (t) MS (c)  3  -MS (r) AL (t) MS (c)  --    Moving to and from a bed to a chair (including a wheelchair)?  3  -MS (r) AL (t) MS (c)  3  -MS (r) AL (t) MS (c)  --    Standing up from a chair using your arms (e.g., wheelchair, bedside chair)?  3  -MS (r) AL (t) MS (c)  3  -MS (r) AL (t) MS (c)  --    Climbing 3-5 steps with a railing?  3  -MS (r) AL (t)  MS (c)  3  -MS (r) AL (t) MS (c)  --    To walk in hospital room?  3  -MS (r) AL (t) MS (c)  3  -MS (r) AL (t) MS (c)  --    AM-PAC 6 Clicks Score (PT)  19  -MS (r) AL (t)  18  -KB (r) AL (t)  --       How much help from another is currently needed...    Putting on and taking off regular lower body clothing?  --  --  3  -CS    Bathing (including washing, rinsing, and drying)  --  --  3  -CS    Toileting (which includes using toilet bed pan or urinal)  --  --  3  -CS    Putting on and taking off regular upper body clothing  --  --  3  -CS    Taking care of personal grooming (such as brushing teeth)  --  --  3  -CS    Eating meals  --  --  3  -CS    AM-PAC 6 Clicks Score (OT)  --  --  18  -CS       Functional Assessment    Outcome Measure Options  AM-PAC 6 Clicks Basic Mobility (PT)  -MS (r) AL (t) MS (c)  AM-PAC 6 Clicks Basic Mobility (PT)  -MS (r) AL (t) MS (c)  AM-PAC 6 Clicks Daily Activity (OT)  -CS      User Key  (r) = Recorded By, (t) = Taken By, (c) = Cosigned By    Initials Name Provider Type    Silvina Jarrell EMANUEL, PT, DPT, NCS Physical Therapist    CS Donna Hong, OTR/L, CNT Occupational Therapist    Karen Ahumada, RN Registered Nurse    Brandon Silver, PT Student PT Student         Time Calculation:   PT Charges     Row Name 07/17/19 1056             Time Calculation    Start Time  1018  -MS (r) AL (t) MS (c)      Stop Time  1037  -MS (r) AL (t) MS (c)      Time Calculation (min)  19 min  -MS (r) AL (t)      PT Received On  07/17/19  -MS (r) AL (t) MS (c)         Time Calculation- PT    Total Timed Code Minutes- PT  19 minute(s)  -MS (r) AL (t) MS (c)         Timed Charges    35661 -  PT Neuromuscular Reeducation Minutes  7  -MS (r) AL (t) MS (c)      70152 - Gait Training Minutes   12  -MS (r) AL (t) MS (c)        User Key  (r) = Recorded By, (t) = Taken By, (c) = Cosigned By    Initials Name Provider Type    Silvina Jarrell, PT, DPT, NCS Physical Therapist    Brandon Silver, PT Student PT Student         Therapy Charges for Today     Code Description Service Date Service Provider Modifiers Qty    22742118166 HC PT EVAL MOD COMPLEXITY 4 2019 Brandon Phillips, PT Student GP 1    43768464272 HC GAIT TRAINING EA 15 MIN 2019 Brandon Phillips, PT Student GP 1          PT G-Codes  Outcome Measure Options: AM-PAC 6 Clicks Basic Mobility (PT)  AM-PAC 6 Clicks Score (PT): 19  AM-PAC 6 Clicks Score (OT): 18    BRANDON Phillips, PT Student  2019         Electronically signed by Silvina Grover, PT, DPT, NCS at 2019 11:07 AM          Occupational Therapy Notes (last 24 hours) (Notes from 2019  3:16 PM through 2019  3:16 PM)      Enma Ellis COTA/L at 2019 11:13 AM          Acute Care - Occupational Therapy Treatment Note  [facility]     Patient Name: Lukasz Savage  : 1965  MRN: [id number]  Today's Date: 2019  Onset of Illness/Injury or Date of Surgery: 07/15/19  Date of Referral to OT: 07/15/19  Referring Physician: Dr. Webb    Admit Date: 7/15/2019       ICD-10-CM ICD-9-CM   1. Altered mental status, unspecified altered mental status type R41.82 780.97   2. Valproic acid toxicity, accidental or unintentional, initial encounter T42.6X1A 966.3     E855.0   3. Impaired functional mobility, balance, gait, and endurance Z74.09 V49.89   4. Dysphagia, unspecified type R13.10 787.20   5. Impaired mobility and ADLs Z74.09 799.89     Patient Active Problem List   Diagnosis   • HTN (hypertension)   • Seizures (CMS/HCC)   • Anemia   • Hypoglycemia   • Left hemiparesis (CMS/HCC)   • Elevated transaminase level   • Inoperable anaplastic glioma of brain    • Hyperammonemia (CMS/HCC)   • Altered mental status   • Metabolic encephalopathy   • Valproic acid toxicity, possible    • Left-sided weakness     Past Medical History:   Diagnosis Date   • Anemia 2019   • Angio-edema 7/3/2017   • Anxiety    • Arthritis    • Cancer (CMS/HCC) 2019    Brain cancer diagnoised yesterday  Dr Trevino    •  Clostridium difficile colitis    • Erectile dysfunction 10/6/2016   • GERD (gastroesophageal reflux disease)    • Gout    • HCAP (healthcare-associated pneumonia) 7/11/2017   • Hyperlipidemia    • Malignant hypertension    • MSSA (methicillin susceptible Staphylococcus aureus) infection 7/31/2017   • Obstructive sleep apnea    • S/P shoulder surgery 7/27/2018   • Seizures (CMS/HCC) 7/4/2017     Past Surgical History:   Procedure Laterality Date   • COLONOSCOPY     • CRANIOTOMY Right 4/15/2019    Procedure: CRANIOTOMY WITH BIOPSY RIGHT;  Surgeon: Dillon Trevino MD;  Location: [facility] OR;  Service: Neurosurgery   • SHOULDER ARTHROSCOPY Left    • TRACHEOSTOMY N/A 7/12/2017    Procedure: TRACHEOSTOMY WITH TRACHEOSCOPY;  Surgeon: Jairon Pierson MD;  Location: [facility] OR;  Service:        Therapy Treatment    Rehabilitation Treatment Summary     Row Name 07/17/19 1041 07/17/19 0930          Treatment Time/Intention    Discipline  physical therapist  -MS,AL,MS2  occupational therapy assistant  -TS     Document Type  --  therapy note (daily note)  -TS     Subjective Information  --  no complaints  -TS2     Patient/Family Observations  Mother present  -MS,AL,MS2  --     Care Plan Review  evaluation/treatment results reviewed;care plan/treatment goals reviewed;risks/benefits reviewed;patient/other agree to care plan  -MS,AL,MS2  --     Existing Precautions/Restrictions  fall  -MS,AL,MS2  fall  -TS2     Recorded by [MS,AL,MS2] Silvina Grover, PT, DPT, NCS (r) Brandon Phillips, PT Student (t) Silvina Grover PT, DPT, NCS (c) 07/17/19 1107 [TS] Enma Ellis, AGUILAR/L 07/17/19 0931  [TS2] Enma Ellis, AGUILAR/L 07/17/19 1111     Row Name 07/17/19 1041             Cognitive Assessment/Intervention    Additional Documentation  Cognitive Assessment/Intervention (Group)  -MS,AL,MS2      Recorded by [MS,AL,MS2] Silvina Grover, PT, DPT, NCS (r) Brandon Phillips, PT Student (t) Silvina Grover, PT, DPT, NCS (c) 07/17/19 1107      Row  Name 07/17/19 1041 07/17/19 0930          Cognitive Assessment/Intervention- PT/OT    Affect/Mental Status (Cognitive)  flat/blunted affect;low arousal/lethargic  -MS,AL,MS2  --     Orientation Status (Cognition)  oriented x 3  -MS,AL,MS2  --     Follows Commands (Cognition)  follows two step commands;75-90% accuracy  -MS,AL,MS2  --     Cognitive Function (Cognitive)  safety deficit  -MS,AL,MS2  --     Safety Deficit (Cognitive)  mild deficit  -MS,AL,MS2  --     Personal Safety Interventions  fall prevention program maintained;gait belt;nonskid shoes/slippers when out of bed;supervised activity  -MS,AL,MS2  fall prevention program maintained;gait belt;nonskid shoes/slippers when out of bed  -TS     Recorded by [MS,AL,MS2] Silvina Grover, PT, DPT, NCS (r) Brandon Phillips, PT Student (t) Silvina Grover, PT, DPT, NCS (c) 07/17/19 1107 [TS] Enma Ellis COTA/L 07/17/19 1111     Row Name 07/17/19 1041 07/17/19 0930          Bed Mobility Assessment/Treatment    Bed Mobility Assessment/Treatment  sit-supine;scooting/bridging  -MS,AL,MS2  supine-sit  -TS     Scooting/Bridging [place] (Bed Mobility)  supervision  -MS,AL,MS2  --     Supine-Sit [place] (Bed Mobility)  --  supervision  -TS     Sit-Supine [place] (Bed Mobility)  supervision;verbal cues  -MS,AL,MS2  --     Assistive Device (Bed Mobility)  --  draw sheet;head of bed elevated  -TS     Recorded by [MS,AL,MS2] Silvina Grover, PT, DPT, NCS (r) Brandon Phillips, PT Student (t) Silvina Grover, PT, DPT, NCS (c) 07/17/19 1107 [TS] Enma Ellis COTA/L 07/17/19 1111     Row Name 07/17/19 0930             Functional Mobility    Functional Mobility- Ind. Level  contact guard assist  -TS      Functional Mobility- Device  rolling walker  -TS      Functional Mobility- Comment  in room, in BR  -TS      Recorded by [TS] Enma Ellis COTA/L 07/17/19 1111      Row Name 07/17/19 1041 07/17/19 0930          Transfer Assessment/Treatment    Transfer  Assessment/Treatment  sit-stand transfer;stand-sit transfer  -MS,AL,MS2  sit-stand transfer;stand-sit transfer;toilet transfer  -TS     Comment (Transfers)  Cues needed to push up from chair/bed  -MS,AL,MS2  --     Recorded by [MS,AL,MS2] Silvina Grover, PT, DPT, NCS (r) Brandon Phillips, PT Student (t) Silvina Grover, PT, DPT, NCS (c) 07/17/19 1107 [TS] Enma Ellis, AGUILAR/L 07/17/19 1111     Row Name 07/17/19 1041 07/17/19 0930          Sit-Stand Transfer    Sit-Stand [place] (Transfers)  supervision;verbal cues  -MS,AL,MS2  stand by assist;verbal cues  -TS     Assistive Device (Sit-Stand Transfers)  walker, front-wheeled  -MS,AL,MS2  walker, front-wheeled  -TS     Recorded by [MS,AL,MS2] Silvina Grover, PT, DPT, NCS (r) Brandon Phillips, PT Student (t) Silvina Grover, PT, DPT, NCS (c) 07/17/19 1107 [TS] Enma Ellis, AGUILAR/L 07/17/19 1111     Row Name 07/17/19 1041 07/17/19 0930          Stand-Sit Transfer    Stand-Sit [place] (Transfers)  supervision;verbal cues  -MS,AL,MS2  stand by assist  -TS     Assistive Device (Stand-Sit Transfers)  walker, front-wheeled  -MS,AL,MS2  walker, front-wheeled  -TS     Recorded by [MS,AL,MS2] Silvina Grover, PT, DPT, NCS (r) Brandon Phillips, PT Student (t) Silvina Grover, PT, DPT, NCS (c) 07/17/19 1107 [TS] Enma Ellis N, AGUILAR/L 07/17/19 1111     Row Name 07/17/19 0930             Toilet Transfer    Type (Toilet Transfer)  sit-stand;stand-sit  -TS      [place] Level (Toilet Transfer)  stand by assist;verbal cues  -TS      Assistive Device (Toilet Transfer)  commode;grab bars/safety frame  -TS      Recorded by [TS] Enma Ellis, AGUILAR/L 07/17/19 1111      Row Name 07/17/19 1041             Gait/Stairs Assessment/Training    [place] Level (Gait)  contact guard  -MSAL,MS2      Assistive Device (Gait)  walker, front-wheeled  -MS,AL,MS2      Distance in Feet (Gait)  Pt ambulated 120ft with CGA with decreased gait speed, improved lateral stability  but still unstable when head movements were added needing CGA. Pt also decreased pace when looking in different directions.   -MS,AL,MS2      Pattern (Gait)  2-point  -MS,AL,MS2      Deviations/Abnormal Patterns (Gait)  base of support, wide;mile decreased;gait speed decreased  -MS,AL,MS2      Comment (Gait/Stairs)  Pt stated he felt to weak to try to perform steps today  -MS,AL,MS2      Recorded by [MS,AL,MS2] Silvina Grover, PT, DPT, NCS (r) Brandon Phillips, PT Student (t) Silvina Grover, PT, DPT, NCS (c) 07/17/19 1107      Row Name 07/17/19 0930             ADL Assessment/Intervention    BADL Assessment/Intervention  lower body dressing;toileting  -TS      Recorded by [TS] Enma Ellis AGUILAR/L 07/17/19 1111      Row Name 07/17/19 0930             Lower Body Dressing Assessment/Training    Lower Body Dressing [place] Level  don;doff;contact guard assist;undergarment;minimum assist (75% patient effort)  -TS      Lower Body Dressing Position  unsupported sitting;supported standing  -TS      Recorded by [TS] Enma Ellis AGUILAR/L 07/17/19 1111      Row Name 07/17/19 0930             Toileting Assessment/Training    [place] Level (Toileting)  toileting skills;perform perineal hygiene;supervision  -TS      Assistive Devices (Toileting)  commode;grab bar/safety frame  -TS      Toileting Position  unsupported sitting;supported standing  -TS      Recorded by [TS] Enma Ellis AGUILAR/L 07/17/19 1111      Row Name 07/17/19 1041             Motor Skills Assessment/Interventions    Additional Documentation  Balance (Group)  -MS,AL,MS2      Recorded by [MS,AL,MS2] Silvina Grover, PT, DPT, NCS (r) Brandon Phillips, PT Student (t) Silvina Grover PT, DPT, NCS (c) 07/17/19 1107      Row Name 07/17/19 1041             Balance    Balance  static standing balance  -MS,AL,MS2      Recorded by [MS,AL,MS2] Silvina Grover, PT, DPT, NCS (r) Brandon Phillips, PT Student (t) Reilly, Silvina R, PT, DPT, NCS (c) 07/17/19  1107      Row Name 07/17/19 1041             Static Standing Balance    Level of [place] (Unsupported Standing, Static Balance)  minimal assist, 75% patient effort  -MS,AL,MS2      Time Able to Maintain Position (Unsupported Standing, Static Balance)  30 to 45 seconds  -MS,AL,MS2      Comment (Unsupported Standing, Static Balance)  Pt able to perform static standing with feet hip width apart with CGA with slight ant/post sway. Pt had increased sway with EC for 10 seconds but no LOB. He was able to stand feet together and modified tandem with min A for foot placement and lateral sway. Unable to keep balance for >10secs with feet together and EC  -MS,AL,MS2      Recorded by [MS,AL,MS2] Silvina Grover, PT, DPT, NCS (r) Brandon Phillips, PT Student (t) Silvina Grover, PT, DPT, NCS (c) 07/17/19 1107      Row Name 07/17/19 1041 07/17/19 0930          Positioning and Restraints    Pre-Treatment Position  sitting in chair/recliner  -MS,AL,MS2  in bed  -TS     Post Treatment Position  bed  -MS,AL,MS2  chair  -TS     In Bed  supine;call light within reach;encouraged to call for assist;exit alarm on;with family/caregiver;side rails up x2  -MS,AL,MS2  --     In Chair  --  call light within reach;encouraged to call for assist;sitting  -TS     Recorded by [MS,AL,MS2] Silvina Grover, PT, DPT, NCS (r) Brandon Phillips, PT Student (t) Silvina Grover PT, DPT, NCS (c) 07/17/19 1107 [TS] Enma Ellis COTA/L 07/17/19 1111     Row Name 07/17/19 1041             Pain Assessment    Additional Documentation  Pain Scale: Numbers Pre/Post-Treatment (Group)  -MS,AL,MS2      Recorded by [MS,AL,MS2] Silvina Grover, PT, DPT, NCS (r) Brandon Phillips, PT Student (t) Silvina Grover, PT, DPT, NCS (c) 07/17/19 1107      Row Name 07/17/19 1041 07/17/19 0930          Pain Scale: Numbers Pre/Post-Treatment    Pain Scale: Numbers, Pretreatment  0/10 - no pain  -MS,AL,MS2  0/10 - no pain  -TS     Pain Scale: Numbers, Post-Treatment  0/10 - no pain   -MS,AL,MS2  0/10 - no pain  -TS     Recorded by [MS,AL,MS2] Silvina Grover, PT, DPT, NCS (r) Brandon Phillips, PT Student (t) Silvina Grover, PT, DPT, NCS (c) 07/17/19 1107 [TS] Enma Ellis DICKSON, AGUILAR/L 07/17/19 1111     Row Name 07/17/19 1041             Plan of Care Review    Plan of Care Reviewed With  patient  -MS,AL,MS2      Recorded by [MS,AL,MS2] Silvina Grover, PT, DPT, NCS (r) Brandon Phillips, PT Student (t) Silvina Grover, PT, DPT, NCS (c) 07/17/19 1107      Row Name 07/17/19 0930             Outcome Summary/Treatment Plan (OT)    Daily Summary of Progress (OT)  progress toward functional goals is good  -TS      Recorded by [TS] Rhonda, Enma N, AGUILAR/L 07/17/19 1111      Row Name 07/17/19 1041             Outcome Summary/Treatment Plan (PT)    Daily Summary of Progress (PT)  progress towards functional goals is fair  -MS,AL,MS2      Anticipated Discharge Disposition (PT)  home with assist;home with home health;inpatient rehabilitation facility  -MS,AL,MS2      Recorded by [MS,AL,MS2] Silvina Grover, PT, DPT, NCS (r) Brandon Phillips, PT Student (t) Silvina Grover, PT, DPT, NCS (c) 07/17/19 1107        User Key  (r) = Recorded By, (t) = Taken By, (c) = Cosigned By    Initials Name Effective Dates Discipline    TS Enma Ellis DICKSON, AGUILAR/L 08/02/16 -  OT    MS Silvina Grover, PT, DPT, NCS 06/19/18 -  PT    Brandon Silver, PT Student 04/24/19 -  PT             Occupational Therapy Education     Title: PT OT SLP Therapies (In Progress)     Topic: Occupational Therapy (In Progress)     Point: ADL training (Done)     Description: Instruct learner(s) on proper safety adaptation and remediation techniques during self care or transfers.   Instruct in proper use of assistive devices.    Learning Progress Summary           Patient Acceptance, E,TB, VU by  at 7/16/2019  9:33 AM    Comment:  OT POC, benefits of activity, discharge planning, ADLs, transfer safety   Family Acceptance, E,TB, VU by CS at 7/16/2019  9:33 AM     Comment:  OT POC, benefits of activity, discharge planning, ADLs, transfer safety                   Point: Home exercise program (Done)     Description: Instruct learner(s) on appropriate technique for monitoring, assisting and/or progressing therapeutic exercises/activities.    Learning Progress Summary           Patient Acceptance, E,TB, VU by  at 7/16/2019  9:33 AM    Comment:  OT POC, benefits of activity, discharge planning, ADLs, transfer safety   Family Acceptance, E,TB, VU by  at 7/16/2019  9:33 AM    Comment:  OT POC, benefits of activity, discharge planning, ADLs, transfer safety                               User Key     Initials Effective Dates Name Provider Type Discipline     04/02/19 -  Donna Hong, OTR/L, CNT Occupational Therapist OT                OT Recommendation and Plan  Outcome Summary/Treatment Plan (OT)  Daily Summary of Progress (OT): progress toward functional goals is good  Daily Summary of Progress (OT): progress toward functional goals is good  Plan of Care Review  Plan of Care Reviewed With: patient  Plan of Care Reviewed With: patient  Outcome Summary: Pt S for bed mobility with HOB elevated and bed rail. Pt transfers with SBA with RW with verbal cues for hand placement. Pt ambulated to/from BR with CGA with RW. Pt min A for LB dressing and CGA for toileting task. Pt would benefit from rehab prior to discharge. Continue OT POC   Outcome Measures     Row Name 07/17/19 1100 07/17/19 1000 07/16/19 0800       How much help from another person do you currently need...    Turning from your back to your side while in flat bed without using bedrails?  --  4  -MS (r) AL (t) MS (c)  3  -MS (r) AL (t) MS (c)    Moving from lying on back to sitting on the side of a flat bed without bedrails?  --  3  -MS (r) AL (t) MS (c)  3  -MS (r) AL (t) MS (c)    Moving to and from a bed to a chair (including a wheelchair)?  --  3  -MS (r) AL (t) MS (c)  3  -MS (r) AL (t) MS (c)    Standing up  from a chair using your arms (e.g., wheelchair, bedside chair)?  --  3  -MS (r) AL (t) MS (c)  3  -MS (r) AL (t) MS (c)    Climbing 3-5 steps with a railing?  --  3  -MS (r) AL (t) MS (c)  3  -MS (r) AL (t) MS (c)    To walk in hospital room?  --  3  -MS (r) AL (t) MS (c)  3  -MS (r) AL (t) MS (c)    AM-PAC 6 Clicks Score (PT)  --  19  -MS (r) AL (t)  18  -KB (r) AL (t)       How much help from another is currently needed...    Putting on and taking off regular lower body clothing?  3  -TS  --  --    Bathing (including washing, rinsing, and drying)  3  -TS  --  --    Toileting (which includes using toilet bed pan or urinal)  3  -TS  --  --    Putting on and taking off regular upper body clothing  3  -TS  --  --    Taking care of personal grooming (such as brushing teeth)  3  -TS  --  --    Eating meals  4  -TS  --  --    AM-PAC 6 Clicks Score (OT)  19  -TS  --  --       Functional Assessment    Outcome Measure Options  AM-PAC 6 Clicks Daily Activity (OT)  -TS  AM-PAC 6 Clicks Basic Mobility (PT)  -MS (r) AL (t) MS (c)  AM-PAC 6 Clicks Basic Mobility (PT)  -MS (r) AL (t) MS (c)    Row Name 07/16/19 0750             How much help from another is currently needed...    Putting on and taking off regular lower body clothing?  3  -CS      Bathing (including washing, rinsing, and drying)  3  -CS      Toileting (which includes using toilet bed pan or urinal)  3  -CS      Putting on and taking off regular upper body clothing  3  -CS      Taking care of personal grooming (such as brushing teeth)  3  -CS      Eating meals  3  -CS      AM-PAC 6 Clicks Score (OT)  18  -CS         Functional Assessment    Outcome Measure Options  AM-PAC 6 Clicks Daily Activity (OT)  -CS        User Key  (r) = Recorded By, (t) = Taken By, (c) = Cosigned By    Initials Name Provider Type    TS Enma Ellis, AGUILAR/L Occupational Therapy Assistant    Silvina Jarrell, PT, DPT, NCS Physical Therapist    Donna Moore, OTR/L, CNT  Occupational Therapist    Karen Ahumada, RN Registered Nurse    Brandon Silver, PT Student PT Student           Time Calculation:   Time Calculation- OT     Row Name 07/17/19 1113 07/17/19 1056          Time Calculation- OT    OT Start Time  0930  -TS  --     OT Stop Time  1000  -TS  --     OT Time Calculation (min)  30 min  -TS  --     Total Timed Code Minutes- OT  30 minute(s)  -TS  --     OT Received On  07/17/19  -TS  --        Timed Charges    89706 - Gait Training Minutes   --  12  -MS (r) AL (t) MS (c)     34981 - OT Self Care/Mgmt Minutes  30  -TS  --       User Key  (r) = Recorded By, (t) = Taken By, (c) = Cosigned By    Initials Name Provider Type    TS Enma Ellis COTA/L Occupational Therapy Assistant    Silvina Jarrell EMANUEL, PT, DPT, NCS Physical Therapist    Brandon Silver, PT Student PT Student        Therapy Charges for Today     Code Description Service Date Service Provider Modifiers Qty    [id number] HC OT SELF CARE/MGMT/TRAIN EA 15 MIN 7/17/2019 Enma Ellis COTA/L GO 2               Enma FORMAN. JEFF Ellis/SRINIVAS  7/17/2019    Electronically signed by Enma Ellis COTA/L at 7/17/2019 11:14 AM     Rhonda, Enma N, AGUILAR/L at 7/17/2019 11:13 AM          Problem: Patient Care Overview  Goal: Plan of Care Review  Outcome: Ongoing (interventions implemented as appropriate)   07/17/19 1112   Coping/Psychosocial   Plan of Care Reviewed With patient   Plan of Care Review   Progress improving   OTHER   Outcome Summary Pt S for bed mobility with HOB elevated and bed rail. Pt transfers with SBA with RW with verbal cues for hand placement. Pt ambulated to/from BR with CGA with RW. Pt min A for LB dressing and CGA for toileting task. Pt would benefit from rehab prior to discharge. Continue OT POC            Electronically signed by Enma Ellis COTA/L at 7/17/2019 11:13 AM

## 2019-07-17 NOTE — PLAN OF CARE
Problem: Patient Care Overview  Goal: Plan of Care Review   07/17/19 1051   Coping/Psychosocial   Plan of Care Reviewed With patient   Plan of Care Review   Progress improving   OTHER   Outcome Summary PT tx completed. Pt able to walk with improved stability with RW but still has trouble with dual tasking and head movements keeping up the same pace. He fatigued after walking and declined to perform stairs stating he felt like he couldn't do it. He continues to present with impaired activity tolerance and balance, and decreased strength with mobility. He would continue to benefit from skilled therapy to work on balance with OOB activities to reduce fall risk and improve endurance to reduce fatigue. Continue with PT POC and d/c to home with home health and assist or inpatient rehab.

## 2019-07-17 NOTE — THERAPY TREATMENT NOTE
Acute Care - Speech Language Pathology   Swallow Treatment Note [facility]     Patient Name: Lukasz Savage  : 1965  MRN: [id number]  Today's Date: 2019  Onset of Illness/Injury or Date of Surgery: 07/15/19     Referring Physician: Dr. Webb      Admit Date: 7/15/2019    Just missed patient with meal.  He was able to state precaution of liquid wash but not others.  SLP reviewed with patient.  Family states he has been placing bolus on the right.  Oral cavity was clear when SLP checked.  Lungs are clear per RN charting.    Daniela Bentley, MS CCC-SLP 2019 3:26 PM    Visit Dx:      ICD-10-CM ICD-9-CM   1. Altered mental status, unspecified altered mental status type R41.82 780.97   2. Valproic acid toxicity, accidental or unintentional, initial encounter T42.6X1A 966.3     E855.0   3. Impaired functional mobility, balance, gait, and endurance Z74.09 V49.89   4. Dysphagia, unspecified type R13.10 787.20   5. Impaired mobility and ADLs Z74.09 799.89     Patient Active Problem List   Diagnosis   • HTN (hypertension)   • Seizures (CMS/HCC)   • Anemia   • Hypoglycemia   • Left hemiparesis (CMS/HCC)   • Elevated transaminase level   • Inoperable anaplastic glioma of brain    • Hyperammonemia (CMS/HCC)   • Altered mental status   • Metabolic encephalopathy   • Valproic acid toxicity, possible    • Left-sided weakness       Therapy Treatment  Rehabilitation Treatment Summary     Row Name 19 1500 19 1345 19 1041       Treatment Time/Intention    Discipline  physical therapy assistant  -NW  speech language pathologist  -KW  physical therapist  -ALEX GONZALEZMS2    Document Type  therapy note (daily note)  -NW  therapy note (daily note)  -KW  --    Subjective Information  --  no complaints  -KW  --    Mode of Treatment  --  individual therapy;speech-language pathology  -KW  --    Patient/Family Observations  --  family present  -KW  Mother present  -ALEX GONZALEZ,MS2    Care Plan Review  --  care  plan/treatment goals reviewed  -KW  evaluation/treatment results reviewed;care plan/treatment goals reviewed;risks/benefits reviewed;patient/other agree to care plan  -MS,AL,MS2    Patient Effort  --  good  -KW  --    Comment  pt resting request to let rest   -NW  --  --    Existing Precautions/Restrictions  --  --  fall  -MS,AL,MS2    Recorded by [NW] Dulce Maria Mendez, PTA 07/17/19 1514 [KW] Daniela Bentley, MS CCC-SLP 07/17/19 1522 [MS,AL,MS2] Silvina Grover, PT, DPT, NCS (r) Brandon Phillips, PT Student (t) Silvina Grover, PT, DPT, NCS (c) 07/17/19 1107    Row Name 07/17/19 0930             Treatment Time/Intention    Discipline  occupational therapy assistant  -TS      Document Type  therapy note (daily note)  -TS      Subjective Information  no complaints  -TS2      Existing Precautions/Restrictions  fall  -TS2      Recorded by [TS] Enma Elils, AGUILAR/L 07/17/19 0931  [TS2] Enma Ellis, AGUILAR/L 07/17/19 1111      Row Name 07/17/19 1041             Cognitive Assessment/Intervention    Additional Documentation  Cognitive Assessment/Intervention (Group)  -MS,AL,MS2      Recorded by [MS,AL,MS2] Silvina Grover, PT, DPT, NCS (r) Brandon Phillips, PT Student (t) Silvina Grover, PT, DPT, NCS (c) 07/17/19 1107      Row Name 07/17/19 1041 07/17/19 0930          Cognitive Assessment/Intervention- PT/OT    Affect/Mental Status (Cognitive)  flat/blunted affect;low arousal/lethargic  -MS,AL,MS2  --     Orientation Status (Cognition)  oriented x 3  -MS,AL,MS2  --     Follows Commands (Cognition)  follows two step commands;75-90% accuracy  -MS,AL,MS2  --     Cognitive Function (Cognitive)  safety deficit  -MS,AL,MS2  --     Safety Deficit (Cognitive)  mild deficit  -MS,AL,MS2  --     Personal Safety Interventions  fall prevention program maintained;gait belt;nonskid shoes/slippers when out of bed;supervised activity  -MS,AL,MS2  fall prevention program maintained;gait belt;nonskid shoes/slippers when out of bed  -TS      Recorded by [MS,AL,MS2] Silvina Grover, PT, DPT, NCS (r) Brandon Phillips, PT Student (t) Silvina Grover, PT, DPT, NCS (c) 07/17/19 1107 [TS] Enma Ellis, AGUILAR/L 07/17/19 1111     Row Name 07/17/19 1041 07/17/19 0930          Bed Mobility Assessment/Treatment    Bed Mobility Assessment/Treatment  sit-supine;scooting/bridging  -MS,AL,MS2  supine-sit  -TS     Scooting/Bridging [place] (Bed Mobility)  supervision  -MS,AL,MS2  --     Supine-Sit [place] (Bed Mobility)  --  supervision  -TS     Sit-Supine [place] (Bed Mobility)  supervision;verbal cues  -MS,AL,MS2  --     Assistive Device (Bed Mobility)  --  draw sheet;head of bed elevated  -TS     Recorded by [MS,AL,MS2] Silvina Grover, PT, DPT, NCS (r) Brandon Phillips, PT Student (t) Silvina Grover, PT, DPT, NCS (c) 07/17/19 1107 [TS] Enma Ellis, AGUILAR/L 07/17/19 1111     Row Name 07/17/19 0930             Functional Mobility    Functional Mobility- Ind. Level  contact guard assist  -TS      Functional Mobility- Device  rolling walker  -TS      Functional Mobility- Comment  in room, in BR  -TS      Recorded by [TS] Enma Ellis, AGUILAR/L 07/17/19 1111      Row Name 07/17/19 1041 07/17/19 0930          Transfer Assessment/Treatment    Transfer Assessment/Treatment  sit-stand transfer;stand-sit transfer  -MS,AL,MS2  sit-stand transfer;stand-sit transfer;toilet transfer  -TS     Comment (Transfers)  Cues needed to push up from chair/bed  -MS,AL,MS2  --     Recorded by [MS,AL,MS2] Silivna Grover, PT, DPT, NCS (r) Brandon Phillips, PT Student (t) Silvina Grover, PT, DPT, NCS (c) 07/17/19 1107 [TS] Enma Ellis, GAUILAR/L 07/17/19 1111     Row Name 07/17/19 1041 07/17/19 0930          Sit-Stand Transfer    Sit-Stand [place] (Transfers)  supervision;verbal cues  -MS,AL,MS2  stand by assist;verbal cues  -TS     Assistive Device (Sit-Stand Transfers)  walker, front-wheeled  -MS,AL,MS2  walker, front-wheeled  -TS     Recorded by [MS,AL,MS2]  Silvina Grover, PT, DPT, NCS (r) Brandon Phillips, PT Student (t) Silvina Grover, PT, DPT, NCS (c) 07/17/19 1107 [TS] Enma Ellis, AGUILAR/L 07/17/19 1111     Row Name 07/17/19 1041 07/17/19 0930          Stand-Sit Transfer    Stand-Sit [place] (Transfers)  supervision;verbal cues  -MS,AL,MS2  stand by assist  -TS     Assistive Device (Stand-Sit Transfers)  walker, front-wheeled  -MS,AL,MS2  walker, front-wheeled  -TS     Recorded by [MS,AL,MS2] Silvina Grover PT, DPT, NCS (r) Brandon Phillips, PT Student (t) Silvina Grover, PT, DPT, NCS (c) 07/17/19 1107 [TS] Enma Ellis, AGUILAR/L 07/17/19 1111     Row Name 07/17/19 0930             Toilet Transfer    Type (Toilet Transfer)  sit-stand;stand-sit  -TS      [place] Level (Toilet Transfer)  stand by assist;verbal cues  -TS      Assistive Device (Toilet Transfer)  commode;grab bars/safety frame  -TS      Recorded by [TS] Enma Ellis DICKSON, AGUILAR/L 07/17/19 1111      Row Name 07/17/19 1041             Gait/Stairs Assessment/Training    [place] Level (Gait)  contact guard  -MS,AL,MS2      Assistive Device (Gait)  walker, front-wheeled  -MS,AL,MS2      Distance in Feet (Gait)  Pt ambulated 120ft with CGA with decreased gait speed, improved lateral stability but still unstable when head movements were added needing CGA. Pt also decreased pace when looking in different directions.   -MS,AL,MS2      Pattern (Gait)  2-point  -MS,AL,MS2      Deviations/Abnormal Patterns (Gait)  base of support, wide;mile decreased;gait speed decreased  -MS,AL,MS2      Comment (Gait/Stairs)  Pt stated he felt to weak to try to perform steps today  -MS,AL,MS2      Recorded by [MS,AL,MS2] Silvina Grover, PT, DPT, NCS (r) rBandon Phillips, PT Student (t) Reilly, Silvina R, PT, DPT, NCS (c) 07/17/19 1107      Row Name 07/17/19 7729             ADL Assessment/Intervention    BADL Assessment/Intervention  lower body dressing;toileting  -TS      Recorded by [TS] Enma Ellis,  AGUILAR/L 07/17/19 1111      Row Name 07/17/19 0930             Lower Body Dressing Assessment/Training    Lower Body Dressing [place] Level  don;doff;contact guard assist;undergarment;minimum assist (75% patient effort)  -TS      Lower Body Dressing Position  unsupported sitting;supported standing  -TS      Recorded by [TS] Enma Ellis AGUILAR/L 07/17/19 1111      Row Name 07/17/19 0930             Toileting Assessment/Training    [place] Level (Toileting)  toileting skills;perform perineal hygiene;supervision  -TS      Assistive Devices (Toileting)  commode;grab bar/safety frame  -TS      Toileting Position  unsupported sitting;supported standing  -TS      Recorded by [TS] Enma Ellis AGUILAR/L 07/17/19 1111      Row Name 07/17/19 1041             Motor Skills Assessment/Interventions    Additional Documentation  Balance (Group)  -MS,AL,MS2      Recorded by [MS,AL,MS2] Silvina Grover, PT, DPT, NCS (r) Brandon Phillips, PT Student (t) Silvina Grover, PT, DPT, NCS (c) 07/17/19 1107      Row Name 07/17/19 1041             Balance    Balance  static standing balance  -MS,AL,MS2      Recorded by [MS,AL,MS2] Silvina Grover, PT, DPT, NCS (r) Brandon Phillips, PT Student (t) Silvina Grover, PT, DPT, NCS (c) 07/17/19 1107      Row Name 07/17/19 1041             Static Standing Balance    Level of [place] (Unsupported Standing, Static Balance)  minimal assist, 75% patient effort  -MS,AL,MS2      Time Able to Maintain Position (Unsupported Standing, Static Balance)  30 to 45 seconds  -MS,AL,MS2      Comment (Unsupported Standing, Static Balance)  Pt able to perform static standing with feet hip width apart with CGA with slight ant/post sway. Pt had increased sway with EC for 10 seconds but no LOB. He was able to stand feet together and modified tandem with min A for foot placement and lateral sway. Unable to keep balance for >10secs with feet together and EC  -MS,AL,MS2      Recorded by [MS,AL,MS2] Reilly  Silvina CASTELLANOS PT, DPT, NCS (r) Brandon Phillips, PT Student (t) Silvina Grover PT, DPT, NCS (c) 07/17/19 1107      Row Name 07/17/19 1041 07/17/19 0930          Positioning and Restraints    Pre-Treatment Position  sitting in chair/recliner  -MS,AL,MS2  in bed  -TS     Post Treatment Position  bed  -MS,AL,MS2  chair  -TS     In Bed  supine;call light within reach;encouraged to call for assist;exit alarm on;with family/caregiver;side rails up x2  -MS,AL,MS2  --     In Chair  --  call light within reach;encouraged to call for assist;sitting  -TS     Recorded by [MS,AL,MS2] Silvina Grover PT, DPT, NCS (r) Brandon Phillips, PT Student (t) Silvina Grover PT, DPT, NCS (c) 07/17/19 1107 [TS] Enma Ellis, AGUILAR/L 07/17/19 1111     Row Name 07/17/19 1041             Pain Assessment    Additional Documentation  Pain Scale: Numbers Pre/Post-Treatment (Group)  -MS,AL,MS2      Recorded by [MS,AL,MS2] Silvina Grover, PT, DPT, NCS (r) Brandon Phillips, PT Student (t) Silvina Grover PT, DPT, NCS (c) 07/17/19 1107      Row Name 07/17/19 1041 07/17/19 0930          Pain Scale: Numbers Pre/Post-Treatment    Pain Scale: Numbers, Pretreatment  0/10 - no pain  -MS,AL,MS2  0/10 - no pain  -TS     Pain Scale: Numbers, Post-Treatment  0/10 - no pain  -MS,AL,MS2  0/10 - no pain  -TS     Recorded by [MS,AL,MS2] Silvina Grover, PT, DPT, NCS (r) Brandon Phillips, PT Student (t) Silvina Grover, PT, DPT, NCS (c) 07/17/19 1107 [TS] Enma Ellis, AGUILAR/L 07/17/19 1111     Row Name 07/17/19 1345             Pain Scale: FACES Pre/Post-Treatment    Pain: FACES Scale, Pretreatment  0-->no hurt  -KW      Pain: FACES Scale, Post-Treatment  0-->no hurt  -KW      Recorded by [KW] Daniela Bentley, MS CCC-SLP 07/17/19 1522      Row Name 07/17/19 1041             Plan of Care Review    Plan of Care Reviewed With  patient  -MS,AL,MS2      Recorded by [MS,AL,MS2] Silvina Grover, PT, DPT, NCS (r) Brandon Phillips, PT Student (t) Silvina Grover, PT, DPT, NCS (c) 07/17/19  1107      Row Name 07/17/19 0930             Outcome Summary/Treatment Plan (OT)    Daily Summary of Progress (OT)  progress toward functional goals is good  -TS      Recorded by [TS] Myla Ellisrina FORMAN, AGUILAR/L 07/17/19 1111      Row Name 07/17/19 1041             Outcome Summary/Treatment Plan (PT)    Daily Summary of Progress (PT)  progress towards functional goals is fair  -MS,AL,MS2      Anticipated Discharge Disposition (PT)  home with assist;home with home health;inpatient rehabilitation facility  -MS,AL,MS2      Recorded by [MS,AL,MS2] Silvina Grover, PT, DPT, NCS (r) Brandon Phillips, PT Student (t) Silvina Grover, PT, DPT, NCS (c) 07/17/19 1107      Row Name 07/17/19 1345             Outcome Summary/Treatment Plan (SLP)    Daily Summary of Progress (SLP)  progress toward functional goals is good  -KW      Barriers to Overall Progress (SLP)  n/a  -KW      Plan for Continued Treatment (SLP)  continue to follow  -KW      Anticipated Dischage Disposition  home with assist  -KW      Recorded by [KW] Daniela Bentley MS CCC-SLP 07/17/19 1522        User Key  (r) = Recorded By, (t) = Taken By, (c) = Cosigned By    Initials Name Effective Dates Discipline    TS Rhonda Enma N, AGUILAR/L 08/02/16 -  OT    Silvina Jarrell, PT, DPT, NCS 06/19/18 -  PT    KW Daniela Bentley MS CCC-SLP 08/02/16 -  SLP    Dulce Maria Laird PTA 08/02/16 -  PT    Brandon Silver, PT Student 04/24/19 -  PT          Outcome Summary  Outcome Summary/Treatment Plan (SLP)  Daily Summary of Progress (SLP): progress toward functional goals is good (07/17/19 1345 : Daniela Bentley MS CCC-SLP)  Barriers to Overall Progress (SLP): n/a (07/17/19 1345 : Daniela Bentley MS CCC-SLP)  Plan for Continued Treatment (SLP): continue to follow (07/17/19 1345 : Daniela Bentley MS CCC-SLP)  Anticipated Dischage Disposition: home with assist (07/17/19 1345 : Daniela Bentley MS [facility]-SLP)      SLP GOALS     Row Name 07/17/19 1345 07/16/19 0850          Oral  Nutrition/Hydration Goal 1 (SLP)    Oral Nutrition/Hydration Goal 1, SLP  LTG:  Patient will tolerate Regular Diet with thin liquids using compensatory strategies to reduce pcketing on the left side of oral cavity as determined by SLP  -KW  LTG:  Patient will tolerate Regular Diet with thin liquids using compensatory strategies to reduce pcketing on the left side of oral cavity as determined by SLP  -KW     Time Frame (Oral Nutrition/Hydration Goal 1, SLP)  by discharge  -KW  by discharge  -KW     Barriers (Oral Nutrition/Hydration Goal 1, SLP)  medically complex  -KW  medically complex  -KW     Progress/Outcomes (Oral Nutrition/Hydration Goal 1, SLP)  continuing progress toward goal  -KW  goal ongoing  -KW        Swallow Compensatory Strategies Goal 1 (SLP)    Activity (Swallow Compensatory Strategies/Techniques Goal 1, SLP)  compensatory strategies;during meal intake;small cup sips;small straw sips;small bites;food/liquid placed on stronger right side;alternate food/liquid intake;other (see comments)  -KW  compensatory strategies;during meal intake;small cup sips;small straw sips;small bites;food/liquid placed on stronger right side;alternate food/liquid intake;other (see comments) lingual clearing  -KW     [place]/Accuracy (Swallow Compensatory Strategies/Techniques Goal 1, SLP)  independently (over 90% accuracy)  -KW  independently (over 90% accuracy)  -KW     Time Frame (Swallow Compensatory Strategies/Techniques Goal 1, SLP)  short term goal (STG);by discharge  -KW  short term goal (STG);by discharge  -KW     Barriers (Swallow Compensatory Strategies/Techniques Goal 1, SLP)  medically complex  -KW  medically complex  -KW     Progress/Outcomes (Swallow Compensatory Strategies/Techniques Goal 1, SLP)  continuing progress toward goal  -KW  goal ongoing  -KW       User Key  (r) = Recorded By, (t) = Taken By, (c) = Cosigned By    Initials Name Provider Type    Daniela Lee MS CCC-SLP Speech and Language  Pathologist          EDUCATION  The patient has been educated in the following areas:   Dysphagia (Swallowing Impairment).    SLP Recommendation and Plan  Daily Summary of Progress (SLP): progress toward functional goals is good  Barriers to Overall Progress (SLP): n/a  Plan for Continued Treatment (SLP): continue to follow  Anticipated Dischage Disposition: home with assist                    Time Calculation:   Time Calculation- SLP     Row Name 07/17/19 1526             Time Calculation- SLP    SLP Start Time  1345  -KW      SLP Stop Time  1400  -KW      SLP Time Calculation (min)  15 min  -KW      SLP Received On  07/17/19  -KW        User Key  (r) = Recorded By, (t) = Taken By, (c) = Cosigned By    Initials Name Provider Type    Daniela Lee MS CCC-SLP Speech and Language Pathologist          Therapy Charges for Today     Code Description Service Date Service Provider Modifiers Qty    98474684436 HC ST EVAL ORAL PHARYNG SWALLOW 4 7/16/2019 Daniela Bentley MS CCC-SLP GN 1    [id number] HC ST SELF CARE/MGMT/TRAIN EA 15 MIN 7/17/2019 Daniela Bentley MS CCC-SLP GN 1                 MS LEONIDES Alva  7/17/2019

## 2019-07-17 NOTE — THERAPY TREATMENT NOTE
Acute Care - Occupational Therapy Treatment Note  [facility]     Patient Name: Lukasz Savage  : 1965  MRN: [id number]  Today's Date: 2019  Onset of Illness/Injury or Date of Surgery: 07/15/19  Date of Referral to OT: 07/15/19  Referring Physician: Dr. Webb    Admit Date: 7/15/2019       ICD-10-CM ICD-9-CM   1. Altered mental status, unspecified altered mental status type R41.82 780.97   2. Valproic acid toxicity, accidental or unintentional, initial encounter T42.6X1A 966.3     E855.0   3. Impaired functional mobility, balance, gait, and endurance Z74.09 V49.89   4. Dysphagia, unspecified type R13.10 787.20   5. Impaired mobility and ADLs Z74.09 799.89     Patient Active Problem List   Diagnosis   • HTN (hypertension)   • Seizures (CMS/HCC)   • Anemia   • Hypoglycemia   • Left hemiparesis (CMS/HCC)   • Elevated transaminase level   • Inoperable anaplastic glioma of brain    • Hyperammonemia (CMS/HCC)   • Altered mental status   • Metabolic encephalopathy   • Valproic acid toxicity, possible    • Left-sided weakness     Past Medical History:   Diagnosis Date   • Anemia 2019   • Angio-edema 7/3/2017   • Anxiety    • Arthritis    • Cancer (CMS/HCC) 2019    Brain cancer diagnoised yesterday  Dr Trevino    • Clostridium difficile colitis    • Erectile dysfunction 10/6/2016   • GERD (gastroesophageal reflux disease)    • Gout    • HCAP (healthcare-associated pneumonia) 2017   • Hyperlipidemia    • Malignant hypertension    • MSSA (methicillin susceptible Staphylococcus aureus) infection 2017   • Obstructive sleep apnea    • S/P shoulder surgery 2018   • Seizures (CMS/HCC) 2017     Past Surgical History:   Procedure Laterality Date   • COLONOSCOPY     • CRANIOTOMY Right 4/15/2019    Procedure: CRANIOTOMY WITH BIOPSY RIGHT;  Surgeon: Dillon Trevino MD;  Location: [facility];  Service: Neurosurgery   • SHOULDER ARTHROSCOPY Left    • TRACHEOSTOMY N/A 2017    Procedure:  TRACHEOSTOMY WITH TRACHEOSCOPY;  Surgeon: Jairon Pierson MD;  Location: [facility];  Service:        Therapy Treatment    Rehabilitation Treatment Summary     Row Name 07/17/19 1041 07/17/19 0930          Treatment Time/Intention    Discipline  physical therapist  -MS,AL,MS2  occupational therapy assistant  -TS     Document Type  --  therapy note (daily note)  -TS     Subjective Information  --  no complaints  -TS2     Patient/Family Observations  Mother present  -MS,AL,MS2  --     Care Plan Review  evaluation/treatment results reviewed;care plan/treatment goals reviewed;risks/benefits reviewed;patient/other agree to care plan  -MS,AL,MS2  --     Existing Precautions/Restrictions  fall  -MS,AL,MS2  fall  -TS2     Recorded by [MS,AL,MS2] Silvina Grover, PT, DPT, NCS (r) Brandon Phillips, PT Student (t) Silvina Grover, PT, DPT, NCS (c) 07/17/19 1107 [TS] Enma Ellis, AGUILAR/L 07/17/19 0931  [TS2] Enma Ellis, AGUILAR/L 07/17/19 1111     Row Name 07/17/19 1041             Cognitive Assessment/Intervention    Additional Documentation  Cognitive Assessment/Intervention (Group)  -MS,AL,MS2      Recorded by [MS,AL,MS2] Silvina Grover, PT, DPT, NCS (r) Brandon Phillips, PT Student (t) Silvina Grover, PT, DPT, NCS (c) 07/17/19 1107      Row Name 07/17/19 1041 07/17/19 0930          Cognitive Assessment/Intervention- PT/OT    Affect/Mental Status (Cognitive)  flat/blunted affect;low arousal/lethargic  -MS,AL,MS2  --     Orientation Status (Cognition)  oriented x 3  -MS,AL,MS2  --     Follows Commands (Cognition)  follows two step commands;75-90% accuracy  -MS,AL,MS2  --     Cognitive Function (Cognitive)  safety deficit  -MS,AL,MS2  --     Safety Deficit (Cognitive)  mild deficit  -MS,AL,MS2  --     Personal Safety Interventions  fall prevention program maintained;gait belt;nonskid shoes/slippers when out of bed;supervised activity  -MS,AL,MS2  fall prevention program maintained;gait belt;nonskid shoes/slippers when out of  bed  -TS     Recorded by [MS,AL,MS2] Silvina Grover, PT, DPT, NCS (r) Brandon Phillips, PT Student (t) Silvina Grover, PT, DPT, NCS (c) 07/17/19 1107 [TS] Enma Ellis, AGUILAR/L 07/17/19 1111     Row Name 07/17/19 1041 07/17/19 0930          Bed Mobility Assessment/Treatment    Bed Mobility Assessment/Treatment  sit-supine;scooting/bridging  -MS,AL,MS2  supine-sit  -TS     Scooting/Bridging [place] (Bed Mobility)  supervision  -MS,AL,MS2  --     Supine-Sit [place] (Bed Mobility)  --  supervision  -TS     Sit-Supine [place] (Bed Mobility)  supervision;verbal cues  -MS,AL,MS2  --     Assistive Device (Bed Mobility)  --  draw sheet;head of bed elevated  -TS     Recorded by [MS,AL,MS2] Silvina Groevr PT, DPT, NCS (r) Brandon Phillips, PT Student (t) Silvina Grover, PT, DPT, NCS (c) 07/17/19 1107 [TS] Enma Ellis, AGUILAR/L 07/17/19 1111     Row Name 07/17/19 0930             Functional Mobility    Functional Mobility- Ind. Level  contact guard assist  -TS      Functional Mobility- Device  rolling walker  -TS      Functional Mobility- Comment  in room, in BR  -TS      Recorded by [TS] Enma Ellis, AGUILAR/L 07/17/19 1111      Row Name 07/17/19 1041 07/17/19 0930          Transfer Assessment/Treatment    Transfer Assessment/Treatment  sit-stand transfer;stand-sit transfer  -MS,AL,MS2  sit-stand transfer;stand-sit transfer;toilet transfer  -TS     Comment (Transfers)  Cues needed to push up from chair/bed  -MS,AL,MS2  --     Recorded by [MS,AL,MS2] Silvina Grover, PT, DPT, NCS (r) Brandon Phillips, PT Student (t) Silvina Grover, PT, DPT, NCS (c) 07/17/19 1107 [TS] Enma Ellis, AGUILAR/L 07/17/19 1111     Row Name 07/17/19 1041 07/17/19 0930          Sit-Stand Transfer    Sit-Stand [place] (Transfers)  supervision;verbal cues  -MS,AL,MS2  stand by assist;verbal cues  -TS     Assistive Device (Sit-Stand Transfers)  walker, front-wheeled  -MS,AL,MS2  walker, front-wheeled  -TS     Recorded by  [MS,AL,MS2] Silvina Grover, PT, DPT, NCS (r) Brandon Phillips, PT Student (t) Silvina Grover, PT, DPT, NCS (c) 07/17/19 1107 [TS] Enma Ellis, AGUILAR/L 07/17/19 1111     Row Name 07/17/19 1041 07/17/19 0930          Stand-Sit Transfer    Stand-Sit [place] (Transfers)  supervision;verbal cues  -MS,AL,MS2  stand by assist  -TS     Assistive Device (Stand-Sit Transfers)  walker, front-wheeled  -MS,AL,MS2  walker, front-wheeled  -TS     Recorded by [MS,AL,MS2] Silvina Grover PT, DPT, NCS (r) Brandon Phillips, PT Student (t) Silvina Grover, PT, DPT, NCS (c) 07/17/19 1107 [TS] Enma Ellis, AGUILAR/L 07/17/19 1111     Row Name 07/17/19 0930             Toilet Transfer    Type (Toilet Transfer)  sit-stand;stand-sit  -TS      [place] Level (Toilet Transfer)  stand by assist;verbal cues  -TS      Assistive Device (Toilet Transfer)  commode;grab bars/safety frame  -TS      Recorded by [TS] Enma Ellis, AGUILAR/L 07/17/19 1111      Row Name 07/17/19 1041             Gait/Stairs Assessment/Training    [place] Level (Gait)  contact guard  -MS,AL,MS2      Assistive Device (Gait)  walker, front-wheeled  -MS,AL,MS2      Distance in Feet (Gait)  Pt ambulated 120ft with CGA with decreased gait speed, improved lateral stability but still unstable when head movements were added needing CGA. Pt also decreased pace when looking in different directions.   -MS,AL,MS2      Pattern (Gait)  2-point  -MS,AL,MS2      Deviations/Abnormal Patterns (Gait)  base of support, wide;mile decreased;gait speed decreased  -MS,AL,MS2      Comment (Gait/Stairs)  Pt stated he felt to weak to try to perform steps today  -MS,AL,MS2      Recorded by [MS,AL,MS2] Silvina Grover, PT, DPT, NCS (r) Brandon Phillips, PT Student (t) Silvina Grover, PT, DPT, NCS (c) 07/17/19 1107      Row Name 07/17/19 5700             ADL Assessment/Intervention    BADL Assessment/Intervention  lower body dressing;toileting  -TS      Recorded by [TS] Rhonda,  Enma N, AGUILAR/L 07/17/19 1111      Row Name 07/17/19 0930             Lower Body Dressing Assessment/Training    Lower Body Dressing [place] Level  don;doff;contact guard assist;undergarment;minimum assist (75% patient effort)  -TS      Lower Body Dressing Position  unsupported sitting;supported standing  -TS      Recorded by [TS] RhondaEnma AGULIAR/L 07/17/19 1111      Row Name 07/17/19 0930             Toileting Assessment/Training    [place] Level (Toileting)  toileting skills;perform perineal hygiene;supervision  -TS      Assistive Devices (Toileting)  commode;grab bar/safety frame  -TS      Toileting Position  unsupported sitting;supported standing  -TS      Recorded by [TS] Enma Ellis AGUILAR/L 07/17/19 1111      Row Name 07/17/19 1041             Motor Skills Assessment/Interventions    Additional Documentation  Balance (Group)  -MS,AL,MS2      Recorded by [MS,AL,MS2] Silvina Grover, PT, DPT, NCS (r) Brandon Phillips, PT Student (t) Silvina Grover, PT, DPT, NCS (c) 07/17/19 1107      Row Name 07/17/19 1041             Balance    Balance  static standing balance  -MS,AL,MS2      Recorded by [MS,AL,MS2] Silvina Grover, PT, DPT, NCS (r) Brandon Phillips PT Student (t) Silvina Grover, PT, DPT, NCS (c) 07/17/19 1107      Row Name 07/17/19 1041             Static Standing Balance    Level of [place] (Unsupported Standing, Static Balance)  minimal assist, 75% patient effort  -MS,AL,MS2      Time Able to Maintain Position (Unsupported Standing, Static Balance)  30 to 45 seconds  -MS,AL,MS2      Comment (Unsupported Standing, Static Balance)  Pt able to perform static standing with feet hip width apart with CGA with slight ant/post sway. Pt had increased sway with EC for 10 seconds but no LOB. He was able to stand feet together and modified tandem with min A for foot placement and lateral sway. Unable to keep balance for >10secs with feet together and EC  -MS,AL,MS2      Recorded by [MS,AL,MS2]  Silvina Grover, PT, DPT, NCS (r) Brandon Phillips, PT Student (t) Silvina Groevr PT, DPT, NCS (c) 07/17/19 1107      Row Name 07/17/19 1041 07/17/19 0930          Positioning and Restraints    Pre-Treatment Position  sitting in chair/recliner  -MS,AL,MS2  in bed  -TS     Post Treatment Position  bed  -MS,AL,MS2  chair  -TS     In Bed  supine;call light within reach;encouraged to call for assist;exit alarm on;with family/caregiver;side rails up x2  -MS,AL,MS2  --     In Chair  --  call light within reach;encouraged to call for assist;sitting  -TS     Recorded by [MS,AL,MS2] Silvina Grover, PT, DPT, NCS (r) Brandon Phillips, PT Student (t) Silvina Grover PT, DPT, NCS (c) 07/17/19 1107 [TS] Enma Ellis, AGUILAR/L 07/17/19 1111     Row Name 07/17/19 1041             Pain Assessment    Additional Documentation  Pain Scale: Numbers Pre/Post-Treatment (Group)  -MS,AL,MS2      Recorded by [MS,AL,MS2] Silvina Grover, PT, DPT, NCS (r) Brandon Phillips, PT Student (t) Silvina Grover PT, DPT, NCS (c) 07/17/19 1107      Row Name 07/17/19 1041 07/17/19 0930          Pain Scale: Numbers Pre/Post-Treatment    Pain Scale: Numbers, Pretreatment  0/10 - no pain  -MS,AL,MS2  0/10 - no pain  -TS     Pain Scale: Numbers, Post-Treatment  0/10 - no pain  -MS,AL,MS2  0/10 - no pain  -TS     Recorded by [MS,AL,MS2] Silvina Grover, PT, DPT, NCS (r) Brandon Phillips, PT Student (t) Silvina Grover, PT, DPT, NCS (c) 07/17/19 1107 [TS] Enma Ellis, AGUILAR/L 07/17/19 1111     Row Name 07/17/19 1041             Plan of Care Review    Plan of Care Reviewed With  patient  -MS,AL,MS2      Recorded by [MS,AL,MS2] Silvina Grover, PT, DPT, NCS (r) Brandon Phillips, PT Student (t) Silvina Grover, PT, DPT, NCS (c) 07/17/19 1107      Row Name 07/17/19 0930             Outcome Summary/Treatment Plan (OT)    Daily Summary of Progress (OT)  progress toward functional goals is good  -TS      Recorded by [TS] Enma Ellis COTA/L 07/17/19 1111      Row Name  07/17/19 1041             Outcome Summary/Treatment Plan (PT)    Daily Summary of Progress (PT)  progress towards functional goals is fair  -MS,AL,MS2      Anticipated Discharge Disposition (PT)  home with assist;home with home health;inpatient rehabilitation facility  -MS,AL,MS2      Recorded by [MS,AL,MS2] Silvina Grover, PT, DPT, NCS (r) Brandon Phillips, PT Student (t) Silvina Grover, PT, DPT, NCS (c) 07/17/19 1107        User Key  (r) = Recorded By, (t) = Taken By, (c) = Cosigned By    Initials Name Effective Dates Discipline    TS Rhonda Enma DICKSON, AGUILAR/L 08/02/16 -  OT    Silvina Jarrell, PT, DPT, NCS 06/19/18 -  PT    Brandon Silver, PT Student 04/24/19 -  PT             Occupational Therapy Education     Title: PT OT SLP Therapies (In Progress)     Topic: Occupational Therapy (In Progress)     Point: ADL training (Done)     Description: Instruct learner(s) on proper safety adaptation and remediation techniques during self care or transfers.   Instruct in proper use of assistive devices.    Learning Progress Summary           Patient Acceptance, E,TB, VU by CS at 7/16/2019  9:33 AM    Comment:  OT POC, benefits of activity, discharge planning, ADLs, transfer safety   Family Acceptance, E,TB, VU by CS at 7/16/2019  9:33 AM    Comment:  OT POC, benefits of activity, discharge planning, ADLs, transfer safety                   Point: Home exercise program (Done)     Description: Instruct learner(s) on appropriate technique for monitoring, assisting and/or progressing therapeutic exercises/activities.    Learning Progress Summary           Patient Acceptance, E,TB, VU by CS at 7/16/2019  9:33 AM    Comment:  OT POC, benefits of activity, discharge planning, ADLs, transfer safety   Family Acceptance, E,TB, VU by CS at 7/16/2019  9:33 AM    Comment:  OT POC, benefits of activity, discharge planning, ADLs, transfer safety                               User Key     Initials Effective Dates Name Provider Type  Discipline    CS 04/02/19 -  Donna Hong S, OTR/L, CNT Occupational Therapist OT                OT Recommendation and Plan  Outcome Summary/Treatment Plan (OT)  Daily Summary of Progress (OT): progress toward functional goals is good  Daily Summary of Progress (OT): progress toward functional goals is good  Plan of Care Review  Plan of Care Reviewed With: patient  Plan of Care Reviewed With: patient  Outcome Summary: Pt S for bed mobility with HOB elevated and bed rail. Pt transfers with SBA with RW with verbal cues for hand placement. Pt ambulated to/from BR with CGA with RW. Pt min A for LB dressing and CGA for toileting task. Pt would benefit from rehab prior to discharge. Continue OT POC   Outcome Measures     Row Name 07/17/19 1100 07/17/19 1000 07/16/19 0800       How much help from another person do you currently need...    Turning from your back to your side while in flat bed without using bedrails?  --  4  -MS (r) AL (t) MS (c)  3  -MS (r) AL (t) MS (c)    Moving from lying on back to sitting on the side of a flat bed without bedrails?  --  3  -MS (r) AL (t) MS (c)  3  -MS (r) AL (t) MS (c)    Moving to and from a bed to a chair (including a wheelchair)?  --  3  -MS (r) AL (t) MS (c)  3  -MS (r) AL (t) MS (c)    Standing up from a chair using your arms (e.g., wheelchair, bedside chair)?  --  3  -MS (r) AL (t) MS (c)  3  -MS (r) AL (t) MS (c)    Climbing 3-5 steps with a railing?  --  3  -MS (r) AL (t) MS (c)  3  -MS (r) AL (t) MS (c)    To walk in hospital room?  --  3  -MS (r) AL (t) MS (c)  3  -MS (r) AL (t) MS (c)    AM-PAC 6 Clicks Score (PT)  --  19  -MS (r) AL (t)  18  -KB (r) AL (t)       How much help from another is currently needed...    Putting on and taking off regular lower body clothing?  3  -TS  --  --    Bathing (including washing, rinsing, and drying)  3  -TS  --  --    Toileting (which includes using toilet bed pan or urinal)  3  -TS  --  --    Putting on and taking off regular upper  body clothing  3  -TS  --  --    Taking care of personal grooming (such as brushing teeth)  3  -TS  --  --    Eating meals  4  -TS  --  --    AM-PAC 6 Clicks Score (OT)  19  -TS  --  --       Functional Assessment    Outcome Measure Options  AM-PAC 6 Clicks Daily Activity (OT)  -TS  AM-PAC 6 Clicks Basic Mobility (PT)  -MS (r) AL (t) MS (c)  AM-PAC 6 Clicks Basic Mobility (PT)  -MS (r) AL (t) MS (c)    Row Name 07/16/19 0750             How much help from another is currently needed...    Putting on and taking off regular lower body clothing?  3  -CS      Bathing (including washing, rinsing, and drying)  3  -CS      Toileting (which includes using toilet bed pan or urinal)  3  -CS      Putting on and taking off regular upper body clothing  3  -CS      Taking care of personal grooming (such as brushing teeth)  3  -CS      Eating meals  3  -CS      AM-PAC 6 Clicks Score (OT)  18  -CS         Functional Assessment    Outcome Measure Options  AM-PAC 6 Clicks Daily Activity (OT)  -CS        User Key  (r) = Recorded By, (t) = Taken By, (c) = Cosigned By    Initials Name Provider Type    Enma Richards, AGUILAR/L Occupational Therapy Assistant    Silvina Jarrell, PT, DPT, NCS Physical Therapist    CS Donna Hong, OTR/L, CNT Occupational Therapist    Karen Ahumada RN Registered Nurse    Brandon Silver, PT Student PT Student           Time Calculation:   Time Calculation- OT     Row Name 07/17/19 1113 07/17/19 1056          Time Calculation- OT    OT Start Time  0930  -TS  --     OT Stop Time  1000  -TS  --     OT Time Calculation (min)  30 min  -TS  --     Total Timed Code Minutes- OT  30 minute(s)  -TS  --     OT Received On  07/17/19  -TS  --        Timed Charges    08393 - Gait Training Minutes   --  12  -MS (r) AL (t) MS (c)     24842 - OT Self Care/Mgmt Minutes  30  -TS  --       User Key  (r) = Recorded By, (t) = Taken By, (c) = Cosigned By    Initials Name Provider Type     Enma Ellis,  AGUILAR/L Occupational Therapy Assistant    Silvina Jarrell R, PT, DPT, NCS Physical Therapist    AL Alan, Aj, PT Student PT Student        Therapy Charges for Today     Code Description Service Date Service Provider Modifiers Qty    64289965056 HC OT SELF CARE/MGMT/TRAIN EA 15 MIN 7/17/2019 Enma Ellis COTA/L GO 2               Enma FORMAN. CHRISTAL Ellis  7/17/2019

## 2019-07-17 NOTE — PROGRESS NOTES
Neurology Progress Note      Date of admission: 7/15/2019  9:46 AM  Date of visit: 7/17/2019    Chief Complaint:  F/u valproate toxicity and increased somnolence    Subjective     Subjective:    Patient finally was on CPAP yesterday afternoon and last night. He woke up alert and today was able to take all of his meds orally and he was able to participate in PT.  His Valproate level was 100   A friend of his came by and they sat up and talked for a few hours today without him sleeping.  He is now sleeping again  Medications:  Current Facility-Administered Medications   Medication Dose Route Frequency Provider Last Rate Last Dose   • acetaminophen (TYLENOL) tablet 650 mg  650 mg Oral Q4H PRN Jairon Webb MD       • dexamethasone (DECADRON) injection 2 mg  2 mg Intravenous Q6H Jairon Webb MD   2 mg at 07/17/19 1152   • valproate (DEPACON) 1,250 mg in sodium chloride 0.9 % 100 mL IVPB  1,250 mg Intravenous Q8H Doreen Zuniga MD        Or   • divalproex (DEPAKOTE) DR tablet 1,250 mg  1,250 mg Oral Q8H Doreen Zuniga MD   1,250 mg at 07/17/19 0547   • lacosamide (VIMPAT) 150 mg in sodium chloride 0.9 % 50 mL IVPB  150 mg Intravenous Q12H Doreen Zuniga  mL/hr at 07/15/19 2135 150 mg at 07/15/19 2135    Or   • lacosamide (VIMPAT) tablet 150 mg  150 mg Oral Q12H Doreen Zuniga MD   150 mg at 07/17/19 1002   • lactulose solution 20 g  20 g Oral Daily Jairon Webb MD   20 g at 07/17/19 1003   • levETIRAcetam (KEPPRA) 1,250 mg in sodium chloride 0.9 % 100 mL IVPB  1,250 mg Intravenous Q12H Doreen Zuniga MD   1,250 mg at 07/15/19 2206    Or   • levETIRAcetam (KEPPRA) tablet 1,250 mg  1,250 mg Oral Q12H Doreen Zuniga MD   1,250 mg at 07/17/19 1002   • levOCARNitine (CARNITOR) 1 GM/10ML solution 500 mg  500 mg Oral Q12H Jairon Webb MD   500 mg at 07/17/19 1152   • LORazepam (ATIVAN) injection 1 mg  1 mg Intravenous Q4H PRN Jairon Webb  MD Camilo       • ondansetron (ZOFRAN) tablet 4 mg  4 mg Oral Q6H PRN Jairon Webb MD        Or   • ondansetron (ZOFRAN) injection 4 mg  4 mg Intravenous Q6H PRN Jairon Webb MD       • pantoprazole (PROTONIX) EC tablet 40 mg  40 mg Oral Q AM Jairon Webb MD   40 mg at 07/17/19 0547   • pregabalin (LYRICA) capsule 75 mg  75 mg Oral Q12H Doreen Zuniga MD   75 mg at 07/17/19 1002   • sodium chloride 0.9 % flush 3 mL  3 mL Intravenous Q12H Jairon Webb MD   3 mL at 07/17/19 1155   • sodium chloride 0.9 % flush 3-10 mL  3-10 mL Intravenous PRN Jairon Webb MD       • sulfamethoxazole-trimethoprim (BACTRIM DS,SEPTRA DS) 800-160 MG per tablet 160 mg  1 tablet Oral Once per day on Mon Wed Fri Jairon Webb MD   160 mg at 07/17/19 1003   • temozolomide (TEMODAR) 150 mg  150 mg Oral Daily Luis Miguel Doyle MD   150 mg at 07/16/19 2119   • venlafaxine XR (EFFEXOR-XR) 24 hr capsule 150 mg  150 mg Oral Daily Jairon Webb MD   150 mg at 07/17/19 1003   • vitamin B-6 (PYRIDOXINE) tablet 100 mg  100 mg Oral Daily Jairon Webb MD   100 mg at 07/17/19 1004       Review of Systems:   -A 14 point review of systems is completed and is negative   Objective     Objective      Vital Signs  Temp:  [97.6 °F (36.4 °C)-98.2 °F (36.8 °C)] 97.9 °F (36.6 °C)  Heart Rate:  [70-83] 83  Resp:  [16-18] 18  BP: (119-143)/(87-99) 124/87  FiO2 (%):  [21 %] 21 %    Physical Exam:    HEENT:  Neck supple  CVS:  Regular rate and rhythm.  No murmurs  Carotid Examination:  No bruits  Lungs:  Clear to auscultation  Abdomen:  Non-tender, Non-distended  Extremities:  No signs of peripheral edema    Neurologic Exam:    -Somnolent but awakens  -No word finding difficulties  -No aphasia  -No dysarthria  -Follows simple and complex commands    Cranial nerves II through XII intact.  EOMI No facial motor asymmetry Hearing intact to voice Tongue protrudes midline    Motor: (strength out of 5:  1=  minimal movement, 2 = movement in plane of gravity, 3 = movement against gravity, 4 = movement against some resistance, 5 = full strength)    Moving all extremities against gravity    DTR:  2+ throughout in all four extremities    Sensory:  -Intact to light touch    Coordination/Gait:  -No ataxia     Results Review:    I reviewed the patient's new clinical results.    Lab Results (last 24 hours)     Procedure Component Value Units Date/Time    Valproic Acid Level, Total [[id number]]  (Abnormal) Collected:  07/17/19 0627    Specimen:  Blood Updated:  07/17/19 0830     Valproic Acid 100.1 mcg/mL     Comprehensive Metabolic Panel [[id number]]  (Abnormal) Collected:  07/17/19 0627    Specimen:  Blood Updated:  07/17/19 0658     Glucose 98 mg/dL      BUN 34 mg/dL      Creatinine 0.97 mg/dL      Sodium 138 mmol/L      Potassium 4.3 mmol/L      Chloride 101 mmol/L      CO2 32.0 mmol/L      Calcium 8.7 mg/dL      Total Protein 5.3 g/dL      Albumin 2.80 g/dL      ALT (SGPT) 67 U/L      AST (SGOT) 51 U/L      Alkaline Phosphatase 54 U/L      Total Bilirubin 0.6 mg/dL      eGFR Non African Amer 81 mL/min/1.73      Globulin 2.5 gm/dL      A/G Ratio 1.1 g/dL      BUN/Creatinine Ratio 35.1     Anion Gap 5.0 mmol/L     Narrative:       GFR Normal >60  Chronic Kidney Disease <60  Kidney Failure <15        Imaging Results (last 24 hours)     ** No results found for the last 24 hours. **          Assessment/Plan     Hospital Problem List      Metabolic encephalopathy    Seizures (CMS/HCC)    Anemia    Elevated transaminase level    Inoperable anaplastic glioma of brain     Hyperammonemia (CMS/HCC)    Valproic acid toxicity, possible     Left-sided weakness    Impression:  1. Valproate toxicity now resolved but yesterday we restarted  his Depakote to 1250 mg every 8 hours from 1500 mg every 8 on background of  a Valproate level of 24 --down from 140 on admission.  Actually 1250 started before we had results back of the 24 as the day  before it was 80. Mow levels are acceptable at 100 but it rapidly climbed to that.  Will need to see tomorrows level to do any further adjustments.  He did get 1250 mg this morning but starting on early afternoon dose he will get 1000 mg  Every 8. Should be following trough levels.   2. Seizures  No episodes of seizure activity while he has been here  3. DORI--on CPAP. He is now more alert  4. 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   5. Elevated liver functions  Valproic acid is 100% metabolized by the liver which may be a contributing factor for why he is now needing a lower dose.     Plan:  · Reduce Lyrica to 50 mg every 12 hours--goal is to wean off of this completely as he started having  hallucinations once he was placed on this medication while he was at [facility].   · Reduce Depakote to 1000 mg every 8--closely monitor levels  · Obtain Valproate level tomorrow but also tonight--suspect we will have to further reduce Depakote  · He will need very close monitoring of valproate levels with results sent to Galen Todd PA-C when patient is discharge and  while in SNF as I strongly suspect further adjust will be needed.         Doreen Parker MD  07/17/19  1:50 PM

## 2019-07-17 NOTE — PROGRESS NOTES
Continued Stay Note  [facility]     Patient Name: Lukasz Savage  MRN: [id number]  Today's Date: 7/17/2019    Admit Date: 7/15/2019    Discharge Plan     Row Name 07/17/19 0908       Plan    Plan Comments  [facility] IS NOT IN NETWORK WITH PT INSURANCE. [facility] IS IN NETWORK WITH PT [employer]. LEFT MESSAGE WITH PT DTR RICARDO) [phone] TO RETURN CALL TO SEE IF THEY AGREE WITH REFERRAL BEING SENT TO [facility].         Discharge Codes    No documentation.             MAYURI Cotto

## 2019-07-17 NOTE — PROGRESS NOTES
Continued Stay Note  [facility]     Patient Name: Lukasz Savage  MRN: [id number]  Today's Date: 7/17/2019    Admit Date: 7/15/2019    Discharge Plan     Row Name 07/17/19 7332       Plan    Plan Comments  SPOKE TO FAMILY AND THEY WANT REFERRAL SENT TO [facility]. CALLED LEEANNA AT [facility] ([phone]) AND THEY ARE IN NETWORK WITH PT INSURANCE. REFERRAL SENT, AWAIT ANSWER.     Row Name 07/17/19 2348       Plan    Plan Comments  SPOKE TO PT MOTHER (MARLIN) REGARDING OTHER SNF'S. MARLIN STATES SHE WANTS PT DTR TO BE THE ONE TO HANDLE THAT PART. MARLIN STATES SHE WILL GET IN TOUCH WITH ISAIAH AND HAVE HER CALL SW. ATTEMPTED AGAIN TO CALL DTR BUT HAD TO LEAVE A MESSAGE.         Discharge Codes    No documentation.             MAYURI Cotto

## 2019-07-17 NOTE — PROGRESS NOTES
MEDICAL ONCOLOGY PROGRESS NOTE  Patient name: Lukasz Savage  Patient : 1965  VISIT # [id number]  MR #[id number]   Room 329    SUBJECTIVE: Discussed with his mother.  He was more lethargic yesterday afternoon, but that was after he received Depakote and Lyrica yesterday.  He awakened during the evening and is again awake this morning.  He did get radiation yesterday.    INTERVAL HISTORY:  Mr. 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  He is completed 20 of 30 planned XRT treatments and was brought to radiation therapy yesterday in anticipation of dose #21 but reported that he had been more lethargic for 2 to 3 days.  He was taken to the emergency room for evaluation.  His mother reports that he has had no significant, obvious seizure activity but has had some trembling of the right upper extremity at times.     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 Doyle on 2019 referred by Dr. Trevino for diagnosis of primary brain tumor, grade 3 astrocytoma. Lukasz was being seen by neurology with complaints of left facial and upper extremity.  · 2019-MRI brain with contrast showed changes in the frontal convexity with a fullness of the sulcal gyral pattern. Specifically, 4.5 x 4.5 x 3.5 cm right frontal lesion. Second, discrete hyperintense nodule measuring 1.1 x 1.9 x 1.5 cm in the subcortical white matter of the paramedian posterior right frontal lobe immediately above the corpus callosum. This was concerning for high-grade glioma.   · 4/15/2019-he underwent a craniotomy with  stereotactic biopsy by Dr. Dillon Trevino at [facility]. 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 Further molecular analysis at [facility] revealed IDH1/2 wild type, MGMT non-methylated, TERT mutation. Complex cytogenetics changes A maximum safe resection was not possible due to concerns of significant sequela. Second opinion was recommended at the [facility].   · 5/17/2019-he was seen by Dr. Oral Jessica. Recommended concurrent chemoradiation.   · 5/24/2019-initial oncology consultation, Dr. Doyle recommended concurrent chemoradiation with Temodar.   · 6/3/2019. CT scan of the head without contrast documented to ill-defined masslike areas of increased attenuation within the right frontal lobe. Largest measuring 3.3 cm, previously measuring 1.8 cm and second lesion in the lateral aspect of the right frontal lobe measuring up to 1.5 cm. No intracranial hemorrhage or midline shift.   · 6/3/2019- MRI of the brain with and without contrast, compared to 4/2/2019 documented masslike appearance at the right frontal lobe measuring 2.9 x 1.4 cm, previously measuring 1.7 x 1.1 cm with mass effect and possible extension into the corpus callosum. No midline shift.   · 6/14/2019-CT scan cervical spine without contrast documented no evidence of acute bony injury. Multilevel disc degeneration spondylosis.   · 6/12/2019- Initiated Temodar along with radiation therapy   · 6/17/2019- MRI of the brain with and without contrast documented 3 cm enhancing, partially necrotic tumor in the both the right corpus callosum body, consistent with known astrocytoma. Additional FLAIR signal on both sides of the right central sulcus with faint contrast enhancement in the precentral gyrus, compatible with tumor extension. No hemorrhage or brain herniation.   · 6/21/2019 - CT scan of the head without contrast hypodense focus of the right frontal lobe measuring 3.3 cm. No intracranial hemorrhage.  No abnormal extra-axial fluid collection. Right frontal craniotomy changes.         OBJECTIVE:    REVIEW OF SYSTEMS  CONSTITUTIONAL: no fever, no night sweats, no fatigue;  HEENT: no blurring of vision, no double vision, no hearing difficulty, no tinnitus, no ulceration, no dental caries, and no dysplasia, no epistaxis;  LUNGS: no cough, no hemoptysis, no wheeze, no shortness of breath;  CARDIOVASCULAR: no palpitation, no chest pain, no shortness of breath;  GI: no abdominal pain, no nausea, no vomiting, no diarrhea, no constipation;  DIPAK: no dysuria, no hematuria, no frequency or urgency, no nephrolithiasis;  MUSCULOSKELETAL: no joint pain, no swelling, no stiffness;  ENDOCRINE: no polyuria, no polydipsia, no cold or heat intolerance;  HEMATOLOGY: no easy bruising or bleeding, no history of clotting disorder;  DERMATOLOGY: no skin rash, no eczema, no pruritus;  PSYCHIATRY: no depression, no anxiety, no panic attacks, no suicidal ideation, no homicidal ideation;  NEUROLOGY: no syncope, no seizures, no numbness or tingling of hands, no numbness or tingling of feet, no paresis;      Vitals:    07/17/19 0519   BP: 127/88   Pulse: 70   Resp: 18   Temp: 97.7 °F (36.5 °C)   SpO2: 97%       Intake/Output Summary (Last 24 hours) at 7/17/2019 0624  Last data filed at 7/16/2019 2200  Gross per 24 hour   Intake 440 ml   Output --   Net 440 ml       PHYSICAL EXAM:   CONSTITUTIONAL: Alert, appropriate, no acute distress, wearing CPAP  EYES: Non icteric, EOM intact, pupils equal round   ENT: Mucus membranes moist, no oral pharyngeal lesions, external inspection of ears and nose are normal  NECK: Supple, no masses.  No palpable thyroid mass  CHEST/LUNGS: CTA bilaterally, normal respiratory effort   CARDIOVASCULAR: RRR, no murmurs.  No lower extremity edema  ABDOMEN: soft non-tender, active bowel sounds, no HSM.  No palpable masses  EXTREMITIES: warm, full ROM in all 4 extremities, no focal weakness.  SKIN: warm, dry with no rashes  or lesions  LYMPH: No cervical, clavicular, axillary, or inguinal lymphadenopathy  NEUROLOGIC: follows commands, non focal   PSYCH: mood and affect appropriate.  Alert and oriented to time, place, person    CBC  Results from last 7 days   Lab Units 07/16/19  0510 07/15/19  1225   WBC 10*3/mm3 9.92 8.81   HEMOGLOBIN g/dL 13.7* 14.5   HEMATOCRIT % 42.2 45.1   PLATELETS 10*3/mm3 112* 124*         Lab Results   Component Value Date     07/16/2019    K 4.9 07/16/2019     07/16/2019    CO2 31.0 07/16/2019    BUN 40 (H) 07/16/2019    CREATININE 1.14 07/16/2019    GLUCOSE 74 07/16/2019    CALCIUM 9.2 07/16/2019    BILITOT 0.7 07/16/2019    ALKPHOS 53 07/16/2019    AST 34 07/16/2019    ALT 61 (H) 07/16/2019    AGRATIO 1.1 07/16/2019    GLOB 2.7 07/16/2019       Lab Results   Component Value Date    INR 0.94 06/21/2019    INR 0.98 06/03/2019    INR 0.94 03/22/2018    PROTIME 12.8 06/21/2019    PROTIME 13.3 06/03/2019    PROTIME 12.8 03/22/2018       Cultures:    Lab Results   Component Value Date    BLOODCX No growth at 5 days 06/15/2019     No components found for: URINCX    ASSESSMENT/PLAN:  CT HEAD WO CONTRAST- 7/15/2019 10:55 AM CDT     HISTORY: Fatigue     COMPARISON: CT head without contrast 06/29/2019     DOSE: 672 mGy-cm     TECHNIQUE: Sequential imaging was performed from the vertex through the  base of the skull without the use of IV contrast.  Sagittal and coronal  reformations were made from the original source data and reviewed.  Automated exposure control was also utilized to decrease patient  radiation dose.     FINDINGS:   There is redemonstration of a hyperdense lesion in the parafalcine left  frontal lobe which measures up to 3.2 cm in size, stable compared to the  previous exam. There is a right craniotomy defect with hyperdense  material noted in the brain parenchyma just deep to this, also stable  from the prior exam. There is no evidence of new intracranial  hemorrhage. There is no evidence  of acute territorial infarct.  Ventricles appear normal in configuration. The basilar cisterns are  patent. Posterior fossa appears grossly normal. Paranasal sinuses and  mastoid air cells appear clear.        IMPRESSION:  Stable appearance of the brain compared to the exam from  06/29/2019, with hyperdense masses in the right frontal lobe without  associated midline shift. Right craniotomy defect is noted with a  hyperdense area just deep to it, also stable from the previous exam.  This may represent an additional mass. No acute intracranial findings  are appreciated.     This report was finalized on 07/15/2019 10:58 by Dr. Driss Ward MD.     ASSESSMENT/PLAN:     Anaplastic grade 3 glioma     XRT to the brain -completed 21 of 30 planned thus far.  #22 should be given today.  Temodar 150 mg daily M-F with XRT - he did get his dose yesterday and will continue while getting XRT     CT head stable     CBC 7/16/2019  WBC 9.92  Hgb 13.7  ,000     Progressive lethargy     Valproic acid level 140.2 on admission 7/15/2019  -  24.1 on 7/16/2019 am     More lethargic yesterday after Depakote and Lyrica given per his mother.     Antiseizure medication  Keppra 1250 mg IV every 12 hours  Lyrica 75 p.o. every 12 hours  Depacon 1250 mg IV every 8 hours    Per Dr. Keith Clark with medical oncology to discharge when stable medically.  Outpatient follow-up as previously arranged next week.      YEVGENIY Hammond    07/17/19  6:24 AM

## 2019-07-17 NOTE — PLAN OF CARE
Problem: Fall Risk (Adult)  Goal: Identify Related Risk Factors and Signs and Symptoms  Outcome: Ongoing (interventions implemented as appropriate)    Goal: Absence of Fall  Outcome: Ongoing (interventions implemented as appropriate)      Problem: Confusion, Acute (Adult)  Goal: Identify Related Risk Factors and Signs and Symptoms  Outcome: Ongoing (interventions implemented as appropriate)    Goal: Cognitive/Functional Impairments Minimized  Outcome: Ongoing (interventions implemented as appropriate)    Goal: Safety  Outcome: Ongoing (interventions implemented as appropriate)      Problem: Patient Care Overview  Goal: Plan of Care Review  Outcome: Ongoing (interventions implemented as appropriate)   07/17/19 0300   Coping/Psychosocial   Plan of Care Reviewed With patient   Plan of Care Review   Progress improving   OTHER   Outcome Summary VSS, except bp elevated at times. Alert w/some confusion noted at times. Continue w/LUE weakness. No seizure activity noted. Cpap on at hs. Safety maintained.        Problem: Skin Injury Risk (Adult)  Goal: Identify Related Risk Factors and Signs and Symptoms  Outcome: Ongoing (interventions implemented as appropriate)    Goal: Skin Health and Integrity  Outcome: Ongoing (interventions implemented as appropriate)

## 2019-07-17 NOTE — PLAN OF CARE
Problem: Patient Care Overview  Goal: Plan of Care Review  Outcome: Ongoing (interventions implemented as appropriate)   07/17/19 5963   Coping/Psychosocial   Plan of Care Reviewed With patient   Plan of Care Review   Progress improving   OTHER   Outcome Summary Just missed patient with meal. He was able to state precaution of liquid wash but not others. SLP reviewed with patient. Family states he has been placing bolus on the right. Oral cavity was clear when SLP checked. Lungs are clear per RN charting.

## 2019-07-17 NOTE — PROGRESS NOTES
"    [facility] Medicine Services  INPATIENT PROGRESS NOTE    Patient Name: Lukasz Savage  Date of Admission: 7/15/2019  Today's Date: 07/17/19  Length of Stay: 2  Primary Care Physician: Malia Vargas MD    Subjective   Chief Complaint: \"doing good\"  HPI     Patient was seen and examined at bedside.  Patient is much more awake and alert than he has been previously.  Patient was able to ambulate to the bathroom with assistance.  Patient denies any significant complaints, we are waiting for placement.  Patient tolerating p.o. intake.        Review of Systems   Constitutional: Negative for activity change, appetite change, chills, diaphoresis, fatigue and fever.   Respiratory: Negative for cough and shortness of breath.    Cardiovascular: Negative for chest pain and palpitations.   Gastrointestinal: Negative for abdominal distention, constipation, diarrhea, nausea and vomiting.        All pertinent negatives and positives are as above. All other systems have been reviewed and are negative unless otherwise stated.     Objective    Temp:  [97.6 °F (36.4 °C)-98.2 °F (36.8 °C)] 97.9 °F (36.6 °C)  Heart Rate:  [70-83] 83  Resp:  [16-18] 18  BP: (119-143)/(87-99) 124/87  FiO2 (%):  [21 %] 21 %  Physical Exam  Constitutional: No distress. Room air.  Friend at bedside.   HENT:   Head: Normocephalic and atraumatic.   Eyes: Conjunctivae are normal. No scleral icterus.   Neck: Neck supple. No JVD present.   Cardiovascular: Normal rate and regular rhythm. Exam reveals no gallop and no friction rub.   No murmur heard.  Pulmonary/Chest: Effort normal and breath sounds normal. No stridor. No respiratory distress. He has no wheezes.   Abdominal: Soft. Bowel sounds are normal. He exhibits no distension. There is no tenderness. There is no guarding.   Musculoskeletal: He exhibits no edema.   Neurological:   Awake and alert; AO x 3; Follows commands.  Left-side 4/5 strength today   Skin: Skin " is warm and dry. He is not diaphoretic. No erythema.   Psychiatric: He has a normal mood and affect. His behavior is normal.   Nursing note and vitals reviewed.          Results Review:  I have reviewed the labs, radiology results, and diagnostic studies.    Laboratory Data:   Results from last 7 days   Lab Units 07/16/19  0510 07/15/19  1225   WBC 10*3/mm3 9.92 8.81   HEMOGLOBIN g/dL 13.7* 14.5   HEMATOCRIT % 42.2 45.1   PLATELETS 10*3/mm3 112* 124*        Results from last 7 days   Lab Units 07/17/19  0627 07/16/19  0510 07/15/19  1225   SODIUM mmol/L 138 141 140   POTASSIUM mmol/L 4.3 4.9 4.7   CHLORIDE mmol/L 101 104 102   CO2 mmol/L 32.0* 31.0 29.0   BUN mg/dL 34* 40* 37*   CREATININE mg/dL 0.97 1.14 1.10   CALCIUM mg/dL 8.7 9.2 8.9   BILIRUBIN mg/dL 0.6 0.7 0.4   ALK PHOS U/L 54 53 62   ALT (SGPT) U/L 67* 61* 61*   AST (SGOT) U/L 51* 34 46*   GLUCOSE mg/dL 98 74 71       Culture Data:        Radiology Data:   Imaging Results (last 24 hours)     ** No results found for the last 24 hours. **          I have reviewed the patient's current medications.     Assessment/Plan     Active Hospital Problems    Diagnosis   • **Metabolic encephalopathy   • Valproic acid toxicity, possible    • Left-sided weakness   • Hyperammonemia (CMS/HCC)   • Inoperable anaplastic glioma of brain    • Elevated transaminase level   • Anemia   • Seizures (CMS/HCC)       Plan:  1.  Continue to monitor on telemetry  2.  Continue carnitine  3.  LFTs improved  4.  CPAP at night  5.  Switch to decadron 2 mg q8h  6.  PO lactulose  7.  SW for placement  8.  SLP  9.  AEDs per neurology  10.  Oncology consulted.  Chemo and radiation per them.   11.  AM VPA level, CMP                   Discharge Planning: I expect the patient to be discharged to SNF in ? Days.  Patient is medically appropriate for discharge.     Jairon Webb MD   07/17/19   1:37 PM

## 2019-07-17 NOTE — PLAN OF CARE
Problem: Fall Risk (Adult)  Goal: Identify Related Risk Factors and Signs and Symptoms  Outcome: Ongoing (interventions implemented as appropriate)    Goal: Absence of Fall  Outcome: Ongoing (interventions implemented as appropriate)      Problem: Confusion, Acute (Adult)  Goal: Identify Related Risk Factors and Signs and Symptoms  Outcome: Ongoing (interventions implemented as appropriate)    Goal: Cognitive/Functional Impairments Minimized  Outcome: Ongoing (interventions implemented as appropriate)    Goal: Safety  Outcome: Ongoing (interventions implemented as appropriate)      Problem: Patient Care Overview  Goal: Plan of Care Review  Outcome: Ongoing (interventions implemented as appropriate)   07/17/19 1112 07/17/19 1423   Coping/Psychosocial   Plan of Care Reviewed With patient --    Plan of Care Review   Progress improving --    OTHER   Outcome Summary --  vss. pt resting well. med adjustments per Dr. Parker. no c/o pain. family at bedside.        Problem: Skin Injury Risk (Adult)  Goal: Identify Related Risk Factors and Signs and Symptoms  Outcome: Ongoing (interventions implemented as appropriate)

## 2019-07-17 NOTE — PROGRESS NOTES
Case Management Readmission Assessment Note       Case Management Readmission Assessment (all recorded)      Readmission Interview     Row Name 07/17/19 1414             Readmission Indications    Is this hospitalization related to the prior hospital diagnosis?  Yes      What was the reason you were admitted?  seizure med toxicity; lethargy      Row Name 07/17/19 1414             Recommendation for rehospitalization    Did you speak with your physician prior to coming to the hospital  Yes      If yes, what physician did you speak with?  radiation MD      Who recommended you return to the hospital?  Specialist      Did you seek care elsewhere prior to coming to the hospital?  No      Row Name 07/17/19 1414             Follow up appointment    Do you have a PCP?  Yes      Did you have an appointment with PCP/specialist after hospitalization within 7 days?  Yes      Did you keep your appointment?  No      If you did not keep appointment, why?  Other (comment) came to hospital for readmit before appt time      Row Name 07/17/19 1414             Medications    Did you have newly prescribed medications at discharge?  Yes      Did you understand the reasons for your medications at discharge and how to take them?  Yes      Did you understand the side effects of your medications?  Yes      Are you taking all of you prescribed medications?  Yes      Row Name 07/17/19 1414             Discharge Instructions    Did you understand your discharge instructions?  Yes      Did your family/caregiver hear your instructions?  Yes      Were you told to eat a special diet?  No      Were you given a number of someone to call if you had questions or concerns?  Yes      Row Name 07/17/19 1414             Index discharge location/services    Where did you go upon discharge?  Home with services [facility]       Do you have supportive family or friends in the home?  Yes      What services were arranged at discharge?  Home Health      Row Name  07/17/19 1414             Discharge Readiness    On a scale of 1-5 (5 being well prepared), how ready were you for discharge  5      Recommendation based on interview  Education on diagnosis/self management SNF if recommended with this admit; pending bed offer [facility] vs [facility]

## 2019-07-17 NOTE — THERAPY TREATMENT NOTE
Acute Care - Physical Therapy Treatment Note  [facility]     Patient Name: Lukasz Savage  : 1965  MRN: [id number]  Today's Date: 2019  Onset of Illness/Injury or Date of Surgery: 07/15/19  Date of Referral to PT: 07/15/19  Referring Physician: Dr. Webb    Admit Date: 7/15/2019    Visit Dx:    ICD-10-CM ICD-9-CM   1. Altered mental status, unspecified altered mental status type R41.82 780.97   2. Valproic acid toxicity, accidental or unintentional, initial encounter T42.6X1A 966.3     E855.0   3. Impaired functional mobility, balance, gait, and endurance Z74.09 V49.89   4. Dysphagia, unspecified type R13.10 787.20   5. Impaired mobility and ADLs Z74.09 799.89     Patient Active Problem List   Diagnosis   • HTN (hypertension)   • Seizures (CMS/HCC)   • Anemia   • Hypoglycemia   • Left hemiparesis (CMS/HCC)   • Elevated transaminase level   • Inoperable anaplastic glioma of brain    • Hyperammonemia (CMS/HCC)   • Altered mental status   • Metabolic encephalopathy   • Valproic acid toxicity, possible    • Left-sided weakness       Therapy Treatment    Rehabilitation Treatment Summary     Row Name 19 1041 19 0930          Treatment Time/Intention    Discipline  physical therapist  -MS,AL,MS2  occupational therapy assistant  -TS     Document Type  --  therapy note (daily note)  -TS     Subjective Information  --  no complaints  (Pended)   -TS2     Patient/Family Observations  Mother present  -MS,AL,MS2  --     Care Plan Review  evaluation/treatment results reviewed;care plan/treatment goals reviewed;risks/benefits reviewed;patient/other agree to care plan  -MS,AL,MS2  --     Existing Precautions/Restrictions  fall  -MS,AL,MS2  fall  (Pended)   -TS2     Recorded by [MS,AL,MS2] Sivlina Grover, PT, DPT, NCS (r) Brandon Phillips, PT Student (t) Silvina Grover, PT, DPT, NCS (c) 19 1107 [TS] Enma Ellis, AGUILAR/L 19 0931  [TS2] Enma Ellis, AGUILAR/L 19 1106     Diana  Name 07/17/19 1041             Cognitive Assessment/Intervention    Additional Documentation  Cognitive Assessment/Intervention (Group)  -MS,AL,MS2      Recorded by [MS,AL,MS2] Silvina Grover, PT, DPT, NCS (r) Brandon Phillips, PT Student (t) Silvina rGover, PT, DPT, NCS (c) 07/17/19 1107      Row Name 07/17/19 1041 07/17/19 0930          Cognitive Assessment/Intervention- PT/OT    Affect/Mental Status (Cognitive)  flat/blunted affect;low arousal/lethargic  -MS,AL,MS2  --     Orientation Status (Cognition)  oriented x 3  -MS,AL,MS2  --     Follows Commands (Cognition)  follows two step commands;75-90% accuracy  -MS,AL,MS2  --     Cognitive Function (Cognitive)  safety deficit  -MS,AL,MS2  --     Safety Deficit (Cognitive)  mild deficit  -MS,AL,MS2  --     Personal Safety Interventions  fall prevention program maintained;gait belt;nonskid shoes/slippers when out of bed;supervised activity  -MS,AL,MS2  fall prevention program maintained;gait belt;nonskid shoes/slippers when out of bed  (Pended)   -TS     Recorded by [MS,AL,MS2] Silvina Grover, PT, DPT, NCS (r) Brandon Phillips, PT Student (t) Silvina Grover, PT, DPT, NCS (c) 07/17/19 1107 [TS] Enma Ellis COTA/L 07/17/19 1106     Row Name 07/17/19 1041 07/17/19 0930          Bed Mobility Assessment/Treatment    Bed Mobility Assessment/Treatment  sit-supine;scooting/bridging  -MS,AL,MS2  supine-sit  (Pended)   -TS     Scooting/Bridging [place] (Bed Mobility)  supervision  -MS,AL,MS2  --     Supine-Sit [place] (Bed Mobility)  --  supervision  (Pended)   -TS     Sit-Supine [place] (Bed Mobility)  supervision;verbal cues  -MS,AL,MS2  --     Assistive Device (Bed Mobility)  --  draw sheet;head of bed elevated  (Pended)   -TS     Recorded by [MS,AL,MS2] Silvina Grover, PT, DPT, NCS (r) Brandon Phillips, PT Student (t) Silvina Grover, PT, DPT, NCS (c) 07/17/19 1107 [TS] Enma Ellis, JEFF/L 07/17/19 1106     Row Name 07/17/19 0930             Functional  Mobility    Functional Mobility- Ind. Level  contact guard assist  (Pended)   -TS      Functional Mobility- Device  rolling walker  (Pended)   -TS      Functional Mobility- Comment  in room, in BR  (Pended)   -TS      Recorded by [TS] RhondaMyla choirina FORMAN, AGUILAR/L 07/17/19 1106      Row Name 07/17/19 1041 07/17/19 0930          Transfer Assessment/Treatment    Transfer Assessment/Treatment  sit-stand transfer;stand-sit transfer  -MS,AL,MS2  sit-stand transfer;stand-sit transfer;toilet transfer  (Pended)   -TS     Comment (Transfers)  Cues needed to push up from chair/bed  -MS,AL,MS2  --     Recorded by [MS,AL,MS2] Silvina Grover, PT, DPT, NCS (r) Brandon Phillips, PT Student (t) Silvina Grover, PT, DPT, NCS (c) 07/17/19 1107 [TS] Myla Ellisrina FORMAN, AGUILAR/L 07/17/19 1106     Row Name 07/17/19 1041             Sit-Stand Transfer    Sit-Stand [place] (Transfers)  supervision;verbal cues  -MS,AL,MS2      Assistive Device (Sit-Stand Transfers)  walker, front-wheeled  -MS,AL,MS2      Recorded by [MS,AL,MS2] Silvina Grover, PT, DPT, NCS (r) Brandon Phillips, PT Student (t) Silvina Grover, PT, DPT, NCS (c) 07/17/19 1107      Row Name 07/17/19 1041             Stand-Sit Transfer    Stand-Sit [place] (Transfers)  supervision;verbal cues  -MS,AL,MS2      Assistive Device (Stand-Sit Transfers)  walker, front-wheeled  -MS,AL,MS2      Recorded by [MS,AL,MS2] Silvina Grover, PT, DPT, NCS (r) Brandon Phillips, PT Student (t) Silvina Grover, PT, DPT, NCS (c) 07/17/19 1107      Row Name 07/17/19 1041             Gait/Stairs Assessment/Training    [place] Level (Gait)  contact guard  -MS,AL,MS2      Assistive Device (Gait)  walker, front-wheeled  -MS,AL,MS2      Distance in Feet (Gait)  Pt ambulated 120ft with CGA with decreased gait speed, improved lateral stability but still unstable when head movements were added needing CGA. Pt also decreased pace when looking in different directions.   -MS,AL,MS2      Pattern (Gait)  2-point   -MS,AL,MS2      Deviations/Abnormal Patterns (Gait)  base of support, wide;mile decreased;gait speed decreased  -MS,AL,MS2      Comment (Gait/Stairs)  Pt stated he felt to weak to try to perform steps today  -MS,AL,MS2      Recorded by [MS,AL,MS2] Silvina Grover, PT, DPT, NCS (r) Brandon Phillips, PT Student (t) Silvina Grover PT, DPT, NCS (c) 07/17/19 1107      Row Name 07/17/19 1041             Motor Skills Assessment/Interventions    Additional Documentation  Balance (Group)  -MS,AL,MS2      Recorded by [MS,AL,MS2] Silvina Grover, PT, DPT, NCS (r) Brandon Phillips, PT Student (t) Silvina Grover, PT, DPT, NCS (c) 07/17/19 1107      Row Name 07/17/19 1041             Balance    Balance  static standing balance  -MS,AL,MS2      Recorded by [MS,AL,MS2] Silvina Grover PT, DPT, NCS (r) Brandon Phillips, PT Student (t) Silvina Grover PT, DPT, NCS (c) 07/17/19 1107      Row Name 07/17/19 1041             Static Standing Balance    Level of [place] (Unsupported Standing, Static Balance)  minimal assist, 75% patient effort  -MS,AL,MS2      Time Able to Maintain Position (Unsupported Standing, Static Balance)  30 to 45 seconds  -MS,AL,MS2      Comment (Unsupported Standing, Static Balance)  Pt able to perform static standing with feet hip width apart with CGA with slight ant/post sway. Pt had increased sway with EC for 10 seconds but no LOB. He was able to stand feet together and modified tandem with min A for foot placement and lateral sway. Unable to keep balance for >10secs with feet together and EC  -MS,AL,MS2      Recorded by [MS,AL,MS2] Silvina Grover, PT, DPT, NCS (r) Brandon Phillips, PT Student (t) Silvina Grover PT, DPT, NCS (c) 07/17/19 1107      Row Name 07/17/19 1041             Positioning and Restraints    Pre-Treatment Position  sitting in chair/recliner  -MS,AL,MS2      Post Treatment Position  bed  -MS,AL,MS2      In Bed  supine;call light within reach;encouraged to call for assist;exit alarm on;with  family/caregiver;side rails up x2  -MS,AL,MS2      Recorded by [MS,AL,MS2] Silvina Grover, PT, DPT, NCS (r) Brandon Phillips, PT Student (t) Silvina Grover, PT, DPT, NCS (c) 07/17/19 1107      Row Name 07/17/19 1041             Pain Assessment    Additional Documentation  Pain Scale: Numbers Pre/Post-Treatment (Group)  -MS,AL,MS2      Recorded by [MS,AL,MS2] Silvina Grover, PT, DPT, NCS (r) Brandon Phillips, PT Student (t) Silvina Grover, PT, DPT, NCS (c) 07/17/19 1107      Row Name 07/17/19 1041             Pain Scale: Numbers Pre/Post-Treatment    Pain Scale: Numbers, Pretreatment  0/10 - no pain  -MS,AL,MS2      Pain Scale: Numbers, Post-Treatment  0/10 - no pain  -MS,AL,MS2      Recorded by [MS,AL,MS2] Silvina Grover, PT, DPT, NCS (r) Brandon Phillips, PT Student (t) Silvina Grover PT, DPT, NCS (c) 07/17/19 1107      Row Name 07/17/19 1041             Plan of Care Review    Plan of Care Reviewed With  patient  -MS,AL,MS2      Recorded by [MS,AL,MS2] Silvina Grover, PT, DPT, NCS (r) Brandon Phillips, PT Student (t) Silvina Grover, PT, DPT, NCS (c) 07/17/19 1107      Row Name 07/17/19 1041             Outcome Summary/Treatment Plan (PT)    Daily Summary of Progress (PT)  progress towards functional goals is fair  -MS,AL,MS2      Anticipated Discharge Disposition (PT)  home with assist;home with home health;inpatient rehabilitation facility  -MS,AL,MS2      Recorded by [MS,AL,MS2] Silvina Grover, PT, DPT, NCS (r) Brandon Phillips, PT Student (t) Silvina Grover, PT, DPT, NCS (c) 07/17/19 1107        User Key  (r) = Recorded By, (t) = Taken By, (c) = Cosigned By    Initials Name Effective Dates Discipline    TS Enma Ellis COTA/L 08/02/16 -  OT    Silvina Jarrell, PT, DPT, NCS 06/19/18 -  PT    Brandon Silver, PT Student 04/24/19 -  PT                   Physical Therapy Education     Title: PT OT SLP Therapies (In Progress)     Topic: Physical Therapy (In Progress)     Point: Precautions (Done)     Learning Progress Summary            Patient Acceptance, LAZARO SANDOVAL by AL at 7/16/2019  8:54 AM    Comment:  PT educated patient on fall risk and needing assist from staff to get out of bed.                               User Key     Initials Effective Dates Name Provider Type Discipline    AL 04/24/19 -  Brandon Phillips, PT Student PT Student PT                PT Recommendation and Plan  Anticipated Discharge Disposition (PT): home with assist, home with home health, inpatient rehabilitation facility  Planned Therapy Interventions (PT Eval): balance training, bed mobility training, gait training, strengthening, stair training, transfer training  Therapy Frequency (PT Clinical Impression): 2 times/day  Outcome Summary/Treatment Plan (PT)  Daily Summary of Progress (PT): progress towards functional goals is fair  Anticipated Discharge Disposition (PT): home with assist, home with home health, inpatient rehabilitation facility  Plan of Care Reviewed With: patient  Progress: improving  Outcome Summary: PT tx completed. Pt able to walk with improved stability with RW but still has trouble with dual tasking and head movements keeping up the same pace. He fatigued after walking and declined to perform stairs stating he felt like he couldn't do it. He continues to present with impaired activity tolerance and balance, and decreased strength with mobility. He would continue to benefit from skilled therapy to work on balance with OOB activities to reduce fall risk and improve endurance to reduce fatigue. Continue with PT POC and d/c to home with home health and assist or inpatient rehab.   Outcome Measures     Row Name 07/17/19 1000 07/16/19 0800 07/16/19 0750       How much help from another person do you currently need...    Turning from your back to your side while in flat bed without using bedrails?  4  -MS (r) AL (t) MS (c)  3  -MS (r) AL (t) MS (c)  --    Moving from lying on back to sitting on the side of a flat bed without bedrails?  3  -MS (r) AL (t) MS (c)  3   -MS (r) AL (t) MS (c)  --    Moving to and from a bed to a chair (including a wheelchair)?  3  -MS (r) AL (t) MS (c)  3  -MS (r) AL (t) MS (c)  --    Standing up from a chair using your arms (e.g., wheelchair, bedside chair)?  3  -MS (r) AL (t) MS (c)  3  -MS (r) AL (t) MS (c)  --    Climbing 3-5 steps with a railing?  3  -MS (r) AL (t) MS (c)  3  -MS (r) AL (t) MS (c)  --    To walk in hospital room?  3  -MS (r) AL (t) MS (c)  3  -MS (r) AL (t) MS (c)  --    AM-PAC 6 Clicks Score (PT)  19  -MS (r) AL (t)  18  -KB (r) AL (t)  --       How much help from another is currently needed...    Putting on and taking off regular lower body clothing?  --  --  3  -CS    Bathing (including washing, rinsing, and drying)  --  --  3  -CS    Toileting (which includes using toilet bed pan or urinal)  --  --  3  -CS    Putting on and taking off regular upper body clothing  --  --  3  -CS    Taking care of personal grooming (such as brushing teeth)  --  --  3  -CS    Eating meals  --  --  3  -CS    AM-PAC 6 Clicks Score (OT)  --  --  18  -CS       Functional Assessment    Outcome Measure Options  AM-PAC 6 Clicks Basic Mobility (PT)  -MS (r) AL (t) MS (c)  AM-PAC 6 Clicks Basic Mobility (PT)  -MS (r) AL (t) MS (c)  AM-PAC 6 Clicks Daily Activity (OT)  -CS      User Key  (r) = Recorded By, (t) = Taken By, (c) = Cosigned By    Initials Name Provider Type    Silvina Jarrell R, PT, DPT, NCS Physical Therapist    CS Donna Hong, OTR/L, CNT Occupational Therapist    Karen Ahumada, RN Registered Nurse    AL Brandon Phillips, PT Student PT Student         Time Calculation:   PT Charges     Row Name 07/17/19 5394             Time Calculation    Start Time  1018  -MS (r) AL (t) MS (c)      Stop Time  1037  -MS (r) AL (t) MS (c)      Time Calculation (min)  19 min  -MS (r) AL (t)      PT Received On  07/17/19  -MS (r) AL (t) MS (c)         Time Calculation- PT    Total Timed Code Minutes- PT  19 minute(s)  -MS (r) AL (t) MS (c)         Timed  Charges    63615 -  PT Neuromuscular Reeducation Minutes  7  -MS (r) AL (t) MS (c)      08695 - Gait Training Minutes   12  -MS (r) AL (t) MS (c)        User Key  (r) = Recorded By, (t) = Taken By, (c) = Cosigned By    Initials Name Provider Type    Silvina Jarrell R, PT, DPT, NCS Physical Therapist    Brandon Silver, PT Student PT Student        Therapy Charges for Today     Code Description Service Date Service Provider Modifiers Qty    46948167287 HC PT EVAL MOD COMPLEXITY 4 7/16/2019 Brandon Phillips, PT Student GP 1    [id number] HC GAIT TRAINING EA 15 MIN 7/17/2019 Brandon Phillips, PT Student GP 1          PT G-Codes  Outcome Measure Options: AM-PAC 6 Clicks Basic Mobility (PT)  AM-PAC 6 Clicks Score (PT): 19  AM-PAC 6 Clicks Score (OT): 18    BRANDON Phillips PT Student  7/17/2019

## 2019-07-17 NOTE — PLAN OF CARE
Problem: Patient Care Overview  Goal: Plan of Care Review  Outcome: Ongoing (interventions implemented as appropriate)   07/17/19 1112   Coping/Psychosocial   Plan of Care Reviewed With patient   Plan of Care Review   Progress improving   OTHER   Outcome Summary Pt S for bed mobility with HOB elevated and bed rail. Pt transfers with SBA with RW with verbal cues for hand placement. Pt ambulated to/from BR with CGA with RW. Pt min A for LB dressing and CGA for toileting task. Pt would benefit from rehab prior to discharge. Continue OT POC

## 2019-07-18 ENCOUNTER — OUTSIDE FACILITY SERVICE (OUTPATIENT)
Dept: FAMILY MEDICINE CLINIC | Facility: CLINIC | Age: 54
End: 2019-07-18

## 2019-07-18 LAB
ALBUMIN SERPL-MCNC: 2.9 G/DL (ref 3.5–5)
ALBUMIN/GLOB SERPL: 1.2 G/DL (ref 1.1–2.5)
ALP SERPL-CCNC: 66 U/L (ref 24–120)
ALT SERPL W P-5'-P-CCNC: 72 U/L (ref 0–54)
ANION GAP SERPL CALCULATED.3IONS-SCNC: 7 MMOL/L (ref 4–13)
AST SERPL-CCNC: 40 U/L (ref 7–45)
BILIRUB SERPL-MCNC: 0.5 MG/DL (ref 0.1–1)
BUN BLD-MCNC: 28 MG/DL (ref 5–21)
BUN/CREAT SERPL: 28 (ref 7–25)
CALCIUM SPEC-SCNC: 8.8 MG/DL (ref 8.4–10.4)
CHLORIDE SERPL-SCNC: 102 MMOL/L (ref 98–110)
CO2 SERPL-SCNC: 30 MMOL/L (ref 24–31)
CREAT BLD-MCNC: 1 MG/DL (ref 0.5–1.4)
GFR SERPL CREATININE-BSD FRML MDRD: 78 ML/MIN/1.73
GLOBULIN UR ELPH-MCNC: 2.5 GM/DL
GLUCOSE BLD-MCNC: 93 MG/DL (ref 70–100)
POTASSIUM BLD-SCNC: 4.1 MMOL/L (ref 3.5–5.3)
PROT SERPL-MCNC: 5.4 G/DL (ref 6.3–8.7)
SODIUM BLD-SCNC: 139 MMOL/L (ref 135–145)
VALPROATE SERPL-MCNC: 128.4 MCG/ML (ref 50–100)
VALPROATE SERPL-MCNC: 74.2 MCG/ML (ref 50–100)

## 2019-07-18 PROCEDURE — 80164 ASSAY DIPROPYLACETIC ACD TOT: CPT | Performed by: PSYCHIATRY & NEUROLOGY

## 2019-07-18 PROCEDURE — 97535 SELF CARE MNGMENT TRAINING: CPT

## 2019-07-18 PROCEDURE — 94799 UNLISTED PULMONARY SVC/PX: CPT

## 2019-07-18 PROCEDURE — 99233 SBSQ HOSP IP/OBS HIGH 50: CPT | Performed by: PSYCHIATRY & NEUROLOGY

## 2019-07-18 PROCEDURE — 80053 COMPREHEN METABOLIC PANEL: CPT | Performed by: INTERNAL MEDICINE

## 2019-07-18 PROCEDURE — 77336 RADIATION PHYSICS CONSULT: CPT | Performed by: RADIOLOGY

## 2019-07-18 PROCEDURE — 97116 GAIT TRAINING THERAPY: CPT

## 2019-07-18 PROCEDURE — 92526 ORAL FUNCTION THERAPY: CPT | Performed by: SPEECH-LANGUAGE PATHOLOGIST

## 2019-07-18 PROCEDURE — 97110 THERAPEUTIC EXERCISES: CPT

## 2019-07-18 PROCEDURE — 77412 RADIATION TX DELIVERY LVL 3: CPT | Performed by: RADIOLOGY

## 2019-07-18 RX ADMIN — DIVALPROEX SODIUM 1000 MG: 500 TABLET, DELAYED RELEASE ORAL at 20:43

## 2019-07-18 RX ADMIN — SODIUM CHLORIDE, PRESERVATIVE FREE 3 ML: 5 INJECTION INTRAVENOUS at 20:50

## 2019-07-18 RX ADMIN — PREGABALIN 50 MG: 50 CAPSULE ORAL at 20:43

## 2019-07-18 RX ADMIN — LACOSAMIDE 150 MG: 50 TABLET, FILM COATED ORAL at 20:43

## 2019-07-18 RX ADMIN — PANTOPRAZOLE SODIUM 40 MG: 40 TABLET, DELAYED RELEASE ORAL at 05:44

## 2019-07-18 RX ADMIN — DIVALPROEX SODIUM 1000 MG: 500 TABLET, DELAYED RELEASE ORAL at 14:58

## 2019-07-18 RX ADMIN — LEVETIRACETAM 1250 MG: 500 TABLET, FILM COATED ORAL at 21:24

## 2019-07-18 RX ADMIN — VENLAFAXINE HYDROCHLORIDE 150 MG: 75 CAPSULE, EXTENDED RELEASE ORAL at 09:16

## 2019-07-18 RX ADMIN — LEVOCARNITINE 500 MG: 1 SOLUTION ORAL at 09:15

## 2019-07-18 RX ADMIN — LACTULOSE 20 G: 20 SOLUTION ORAL at 09:15

## 2019-07-18 RX ADMIN — LACOSAMIDE 150 MG: 50 TABLET, FILM COATED ORAL at 09:15

## 2019-07-18 RX ADMIN — LEVOCARNITINE 500 MG: 1 SOLUTION ORAL at 21:24

## 2019-07-18 RX ADMIN — LEVETIRACETAM 1250 MG: 500 TABLET, FILM COATED ORAL at 09:16

## 2019-07-18 RX ADMIN — DEXAMETHASONE 2 MG: 2 TABLET ORAL at 22:28

## 2019-07-18 RX ADMIN — DEXAMETHASONE 2 MG: 2 TABLET ORAL at 14:58

## 2019-07-18 RX ADMIN — DEXAMETHASONE 2 MG: 2 TABLET ORAL at 05:45

## 2019-07-18 RX ADMIN — PREGABALIN 50 MG: 50 CAPSULE ORAL at 09:16

## 2019-07-18 RX ADMIN — DIVALPROEX SODIUM 1000 MG: 500 TABLET, DELAYED RELEASE ORAL at 05:44

## 2019-07-18 RX ADMIN — Medication 100 MG: at 09:15

## 2019-07-18 NOTE — PLAN OF CARE
Problem: Fall Risk (Adult)  Goal: Identify Related Risk Factors and Signs and Symptoms  Outcome: Ongoing (interventions implemented as appropriate)    Goal: Absence of Fall  Outcome: Ongoing (interventions implemented as appropriate)      Problem: Confusion, Acute (Adult)  Goal: Identify Related Risk Factors and Signs and Symptoms  Outcome: Ongoing (interventions implemented as appropriate)    Goal: Cognitive/Functional Impairments Minimized  Outcome: Ongoing (interventions implemented as appropriate)    Goal: Safety  Outcome: Ongoing (interventions implemented as appropriate)      Problem: Patient Care Overview  Goal: Plan of Care Review  Outcome: Ongoing (interventions implemented as appropriate)   07/18/19 0502   Coping/Psychosocial   Plan of Care Reviewed With patient   Plan of Care Review   Progress improving   OTHER   Outcome Summary Patient has slept through the night, with some periods of waking up to void. Alert and oriented x 4. Easily falls asleep. Wore bipap during the night. Denies any discomfort, no shortness of breath No seizures noted. Left sided weakness. Follows commands. Takes pills whole in applesauce. Incontinent at times. VSS. Safety maintained.      Goal: Individualization and Mutuality  Outcome: Ongoing (interventions implemented as appropriate)      Problem: Skin Injury Risk (Adult)  Goal: Identify Related Risk Factors and Signs and Symptoms  Outcome: Ongoing (interventions implemented as appropriate)    Goal: Skin Health and Integrity  Outcome: Ongoing (interventions implemented as appropriate)

## 2019-07-18 NOTE — PROGRESS NOTES
Neurology Progress Note      Date of admission: 7/15/2019  9:46 AM  Date of visit: 7/18/2019    Chief Complaint:  F/u valproate toxicity and increased somnolence    Subjective     Subjective:    No seizures.  He has some mild bilateral tremors in his hands likely from the Depakote.  Social work is currently awaiting precert for a bed at a local SNF.  Medications:  Current Facility-Administered Medications   Medication Dose Route Frequency Provider Last Rate Last Dose   • acetaminophen (TYLENOL) tablet 650 mg  650 mg Oral Q4H PRN Jairon Webb MD       • dexamethasone (DECADRON) tablet 2 mg  2 mg Oral Q8H Jairon Webb MD   2 mg at 07/18/19 0545   • valproate (DEPACON) 1,000 mg in sodium chloride 0.9 % 100 mL IVPB  1,000 mg Intravenous Q8H Doreen Zuniga MD        Or   • divalproex (DEPAKOTE) DR tablet 1,000 mg  1,000 mg Oral Q8H Doreen Zuniga MD   1,000 mg at 07/18/19 0544   • lacosamide (VIMPAT) 150 mg in sodium chloride 0.9 % 50 mL IVPB  150 mg Intravenous Q12H Doreen Zuniga  mL/hr at 07/15/19 2135 150 mg at 07/15/19 2135    Or   • lacosamide (VIMPAT) tablet 150 mg  150 mg Oral Q12H Doreen Zuniga MD   150 mg at 07/18/19 0915   • lactulose solution 20 g  20 g Oral Daily Jairon Webb MD   20 g at 07/18/19 0915   • levETIRAcetam (KEPPRA) 1,250 mg in sodium chloride 0.9 % 100 mL IVPB  1,250 mg Intravenous Q12H Doreen Zuniga MD   1,250 mg at 07/15/19 2206    Or   • levETIRAcetam (KEPPRA) tablet 1,250 mg  1,250 mg Oral Q12H Doreen Zuniga MD   1,250 mg at 07/18/19 0916   • levOCARNitine (CARNITOR) 1 GM/10ML solution 500 mg  500 mg Oral Q12H Jairon Webb MD   500 mg at 07/18/19 0915   • LORazepam (ATIVAN) injection 1 mg  1 mg Intravenous Q4H PRN Jairon Webb MD       • ondansetron (ZOFRAN) tablet 4 mg  4 mg Oral Q6H PRN Jairon Webb MD        Or   • ondansetron (ZOFRAN) injection 4 mg  4 mg Intravenous Q6H PRN  Jairon Webb MD       • pantoprazole (PROTONIX) EC tablet 40 mg  40 mg Oral Q AM Jairon Webb MD   40 mg at 07/18/19 0544   • pregabalin (LYRICA) capsule 50 mg  50 mg Oral Q12H Doreen Zuniga MD   50 mg at 07/18/19 0916   • sodium chloride 0.9 % flush 3 mL  3 mL Intravenous Q12H Jairon Webb MD   3 mL at 07/17/19 2202   • sodium chloride 0.9 % flush 3-10 mL  3-10 mL Intravenous PRN Jairon Webb MD       • sulfamethoxazole-trimethoprim (BACTRIM DS,SEPTRA DS) 800-160 MG per tablet 160 mg  1 tablet Oral Once per day on Mon Wed Fri Jairon Webb MD   160 mg at 07/17/19 1003   • temozolomide (TEMODAR) 150 mg  150 mg Oral Daily Luis Miguel Doyle MD   150 mg at 07/17/19 2203   • venlafaxine XR (EFFEXOR-XR) 24 hr capsule 150 mg  150 mg Oral Daily Jairon Webb MD   150 mg at 07/18/19 0916   • vitamin B-6 (PYRIDOXINE) tablet 100 mg  100 mg Oral Daily Jairon Webb MD   100 mg at 07/18/19 0915       Review of Systems:   -A 14 point review of systems is completed and is negative   Objective     Objective      Vital Signs  Temp:  [97.4 °F (36.3 °C)-98.4 °F (36.9 °C)] 97.6 °F (36.4 °C)  Heart Rate:  [69-86] 86  Resp:  [12-22] 14  BP: (112-132)/() 132/105  FiO2 (%):  [21 %] 21 %    Physical Exam:    HEENT:  Neck supple  CVS:  Regular rate and rhythm.  No murmurs  Carotid Examination:  No bruits  Lungs:  Clear to auscultation  Abdomen:  Non-tender, Non-distended  Extremities:  No signs of peripheral edema    Neurologic Exam:    -Somnolent but awakens  -No word finding difficulties  -No aphasia  -No dysarthria  -Follows simple and complex commands    Cranial nerves II through XII intact.  EOMI No facial motor asymmetry Hearing intact to voice Tongue protrudes midline    Motor: (strength out of 5:  1= minimal movement, 2 = movement in plane of gravity, 3 = movement against gravity, 4 = movement against some resistance, 5 = full strength)    Moving all extremities  against gravity    DTR:  2+ throughout in all four extremities    Sensory:  -Intact to light touch    Coordination/Gait:  -walking with a RW down de leon with PT with min assistance  Results Review:    I reviewed the patient's new clinical results.    Lab Results (last 24 hours)     Procedure Component Value Units Date/Time    Valproic Acid Level, Total [[id number]]  (Abnormal) Collected:  07/18/19 0828    Specimen:  Blood Updated:  07/18/19 0917     Valproic Acid 128.4 mcg/mL     Comprehensive Metabolic Panel [[id number]]  (Abnormal) Collected:  07/18/19 0828    Specimen:  Blood Updated:  07/18/19 0911     Glucose 93 mg/dL      BUN 28 mg/dL      Creatinine 1.00 mg/dL      Sodium 139 mmol/L      Potassium 4.1 mmol/L      Chloride 102 mmol/L      CO2 30.0 mmol/L      Calcium 8.8 mg/dL      Total Protein 5.4 g/dL      Albumin 2.90 g/dL      ALT (SGPT) 72 U/L      AST (SGOT) 40 U/L      Alkaline Phosphatase 66 U/L      Total Bilirubin 0.5 mg/dL      eGFR Non African Amer 78 mL/min/1.73      Globulin 2.5 gm/dL      A/G Ratio 1.2 g/dL      BUN/Creatinine Ratio 28.0     Anion Gap 7.0 mmol/L     Narrative:       GFR Normal >60  Chronic Kidney Disease <60  Kidney Failure <15    Valproic Acid Level, Total [[id number]]  (Normal) Collected:  07/17/19 1800    Specimen:  Blood Updated:  07/17/19 1829     Valproic Acid 66.5 mcg/mL         Imaging Results (last 24 hours)     ** No results found for the last 24 hours. **      Valproic acid level is 128.4 today at 08:28 with dose given at 0544 ( 66.5 yesterday) - (THIS IS NOT A TROUGH LEVEL!!)    Assessment/Plan     Hospital Problem List      Metabolic encephalopathy    Seizures (CMS/HCC)    Anemia    Elevated transaminase level    Inoperable anaplastic glioma of brain     Hyperammonemia (CMS/HCC)    Valproic acid toxicity, possible     Left-sided weakness    Impression:  1. Valproic acid level today is not a trough level  2. Seizures  No episodes of seizure activity while he has been  here  3. DORI--on CPAP. He is now more alert  4. 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   5. Elevated liver functions  Valproic acid is 100% metabolized by the liver which may be a contributing factor for why he is now needing a lower dose.     Plan:  · Reduce Lyrica to 50 mg every 12 hours--goal is to wean off of this completely as he started having  hallucinations once he was placed on this medication while he was at [facility].   · Reduce Depakote to 1000 mg every 8--closely monitor levels  · Obtain Valproate level today later as trough level - may have to reduce dose further  · He will need very close monitoring of valproate levels with results sent to Galen Todd PA-C when patient is discharge and  while in SNF as I strongly suspect further adjust will be needed.         Harpreet Arroyo MD  07/18/19  11:38 AM

## 2019-07-18 NOTE — PLAN OF CARE
Problem: Patient Care Overview  Goal: Plan of Care Review  Outcome: Ongoing (interventions implemented as appropriate)   07/18/19 1000   Coping/Psychosocial   Plan of Care Reviewed With patient;mother   Plan of Care Review   Progress improving   OTHER   Outcome Summary Swallow treatment completed. Patient lethargic but cooperative. RN reports no concerns with toleration of diet or meds. Patient completed trials of thin liquids and regular solids. Mild residue in left lateral sulci. Given minimal cues the patient was able to use a lingual sweep to clear. OK to continue a regular diet with thin liquids using compensatory strategies. SLP will follow as needed.

## 2019-07-18 NOTE — THERAPY TREATMENT NOTE
Acute Care - Occupational Therapy Treatment Note  [facility]     Patient Name: Lukasz Savage  : 1965  MRN: [id number]  Today's Date: 2019  Onset of Illness/Injury or Date of Surgery: 07/15/19  Date of Referral to OT: 07/15/19  Referring Physician: Dr. Webb    Admit Date: 7/15/2019       ICD-10-CM ICD-9-CM   1. Altered mental status, unspecified altered mental status type R41.82 780.97   2. Valproic acid toxicity, accidental or unintentional, initial encounter T42.6X1A 966.3     E855.0   3. Impaired functional mobility, balance, gait, and endurance Z74.09 V49.89   4. Dysphagia, unspecified type R13.10 787.20   5. Impaired mobility and ADLs Z74.09 799.89     Patient Active Problem List   Diagnosis   • HTN (hypertension)   • Seizures (CMS/HCC)   • Anemia   • Hypoglycemia   • Left hemiparesis (CMS/HCC)   • Elevated transaminase level   • Inoperable anaplastic glioma of brain    • Hyperammonemia (CMS/HCC)   • Altered mental status   • Metabolic encephalopathy   • Valproic acid toxicity, possible    • Left-sided weakness     Past Medical History:   Diagnosis Date   • Anemia 2019   • Angio-edema 7/3/2017   • Anxiety    • Arthritis    • Cancer (CMS/HCC) 2019    Brain cancer diagnoised yesterday  Dr Trevino    • Clostridium difficile colitis    • Erectile dysfunction 10/6/2016   • GERD (gastroesophageal reflux disease)    • Gout    • HCAP (healthcare-associated pneumonia) 2017   • Hyperlipidemia    • Malignant hypertension    • MSSA (methicillin susceptible Staphylococcus aureus) infection 2017   • Obstructive sleep apnea    • S/P shoulder surgery 2018   • Seizures (CMS/HCC) 2017     Past Surgical History:   Procedure Laterality Date   • COLONOSCOPY     • CRANIOTOMY Right 4/15/2019    Procedure: CRANIOTOMY WITH BIOPSY RIGHT;  Surgeon: Dillon Trevino MD;  Location: [facility];  Service: Neurosurgery   • SHOULDER ARTHROSCOPY Left    • TRACHEOSTOMY N/A 2017    Procedure:  TRACHEOSTOMY WITH TRACHEOSCOPY;  Surgeon: Jairon Pierson MD;  Location: [facility] OR;  Service:        Therapy Treatment    Rehabilitation Treatment Summary     Row Name 07/18/19 1116 07/18/19 1035 07/18/19 0949       Treatment Time/Intention    Discipline  physical therapy assistant  -NW  occupational therapy assistant  -TS  speech language pathologist  -MM    Document Type  therapy note (daily note)  -NW  therapy note (daily note)  -TS  therapy note (daily note)  -MM    Subjective Information  --  complains of;weakness;fatigue  -TS2  no complaints  -MM    Mode of Treatment  --  --  individual therapy;speech-language pathology  -MM    Patient/Family Observations  --  --  Mother present   -MM    Care Plan Review  --  --  care plan/treatment goals reviewed  -MM    Therapy Frequency (Swallow)  --  --  at least;3 days per week  -MM    Patient Effort  --  good  -TS2  good  -MM    Comment  --  pt initially lethargic but did alert and participate with tx  -TS2  --    Existing Precautions/Restrictions  --  fall  -TS2  --    Recorded by [NW] Dulce Maria Mendez, NATALIE 07/18/19 1116 [TS] Enma Ellis AGUILAR/L 07/18/19 1036  [TS2] Enma Ellis AGUILAR/L 07/18/19 1111 [MM] Silvina Lake MS CCC-SLP 07/18/19 1000    Row Name 07/18/19 1035             Cognitive Assessment/Intervention- PT/OT    Personal Safety Interventions  fall prevention program maintained;gait belt;nonskid shoes/slippers when out of bed  -TS      Recorded by [TS] Enma Ellis COTA/L 07/18/19 1111      Row Name 07/18/19 1035             Bed Mobility Assessment/Treatment    Bed Mobility Assessment/Treatment  supine-sit  -TS      Supine-Sit [place] (Bed Mobility)  minimum assist (75% patient effort);verbal cues  -TS      Assistive Device (Bed Mobility)  head of bed elevated;bed rails  -TS      Recorded by [TS] Enma Ellis AGUILAR/L 07/18/19 1111      Row Name 07/18/19 1035             Functional Mobility    Functional Mobility-  Ind. Level  contact guard assist  -TS      Functional Mobility- Device  rolling walker  -TS      Functional Mobility- Comment  in room, required assist with RW management  -TS      Recorded by [TS] Enma Ellis AGUILAR/L 07/18/19 1111      Row Name 07/18/19 1035             Transfer Assessment/Treatment    Transfer Assessment/Treatment  sit-stand transfer;stand-sit transfer  -TS      Recorded by [TS] Enma Ellis AGUILAR/L 07/18/19 1111      Row Name 07/18/19 1035             Sit-Stand Transfer    Sit-Stand [place] (Transfers)  stand by assist;verbal cues  -TS      Assistive Device (Sit-Stand Transfers)  walker, front-wheeled  -TS      Recorded by [TS] Enma Ellis AGUILAR/L 07/18/19 1111      Row Name 07/18/19 1035             Stand-Sit Transfer    Stand-Sit [place] (Transfers)  stand by assist  -TS      Assistive Device (Stand-Sit Transfers)  walker, front-wheeled  -TS      Recorded by [TS] Enma Ellis AGUILAR/L 07/18/19 1111      Row Name 07/18/19 1035             ADL Assessment/Intervention    BADL Assessment/Intervention  grooming  -TS      Recorded by [TS] Enma Ellis AGUILAR/L 07/18/19 1111      Row Name 07/18/19 1035             Grooming Assessment/Training    [place] Level (Grooming)  wash face, hands;oral care regimen washed hair with wash cloth after set up   -TS      Grooming Position  supported sitting  -TS      Recorded by [TS] Enma Ellis AGUILAR/L 07/18/19 1111      Row Name 07/18/19 1035             Positioning and Restraints    Pre-Treatment Position  in bed  -TS      Post Treatment Position  chair  -TS      In Chair  sitting;call light within reach;encouraged to call for assist;with other staff  -TS      Recorded by [TS] Enma Ellis AGUILAR/L 07/18/19 1111      Row Name 07/18/19 0949             Pain Assessment    Additional Documentation  Pain Scale: FACES Pre/Post-Treatment (Group)  -MM      Recorded by [MM] Silvina Lake, MS  [facility] 07/18/19 1000      Row Name 07/18/19 1035             Pain Scale: Numbers Pre/Post-Treatment    Pain Scale: Numbers, Pretreatment  0/10 - no pain  -TS      Pain Scale: Numbers, Post-Treatment  0/10 - no pain  -TS      Recorded by [TS] Enma Ellis AGUILAR/L 07/18/19 1111      Row Name 07/18/19 0949             Pain Scale: FACES Pre/Post-Treatment    Pain: FACES Scale, Pretreatment  0-->no hurt  -MM      Pain: FACES Scale, Post-Treatment  0-->no hurt  -MM      Recorded by [MM] Silvina Lake, MS CCC-SLP 07/18/19 1000      Row Name 07/18/19 0949             Outcome Summary/Treatment Plan (SLP)    Daily Summary of Progress (SLP)  progress toward functional goals is good  -MM      Barriers to Overall Progress (SLP)  n/a  -MM      Plan for Continued Treatment (SLP)  Continue to follow.  -MM      Anticipated Dischage Disposition  home with assist  -MM      Recorded by [MM] Silvina Lake, MS CCC-SLP 07/18/19 1000        User Key  (r) = Recorded By, (t) = Taken By, (c) = Cosigned By    Initials Name Effective Dates Discipline    TS Enma Ellis, AGUILAR/L 08/02/16 -  OT    NW Dulce Maria Mendez, PTA 08/02/16 -  PT    MM Silvina Lake, MS CCC-SLP 05/24/19 -  SLP           Rehab Goal Summary     Row Name 07/18/19 0949             Swallow Goals (SLP)    Oral Nutrition/Hydration Goal Selection (SLP)  oral nutrition/hydration, SLP goal 1  -MM      Swallow Compensatory Strategies Goal Selection (SLP)  swallow compensatory strategies, SLP goal 1  -MM      Additional Documentation  swallow compensatory strategies goal selection (SLP)  -MM         Oral Nutrition/Hydration Goal 1 (SLP)    Oral Nutrition/Hydration Goal 1, SLP  LTG:  Patient will tolerate Regular Diet with thin liquids using compensatory strategies to reduce pcketing on the left side of oral cavity as determined by SLP  -MM      Time Frame (Oral Nutrition/Hydration Goal 1, SLP)  by discharge  -MM      Barriers (Oral Nutrition/Hydration  Goal 1, SLP)  medically complex  -MM      Progress/Outcomes (Oral Nutrition/Hydration Goal 1, SLP)  continuing progress toward goal  -MM         Swallow Compensatory Strategies Goal 1 (SLP)    Activity (Swallow Compensatory Strategies/Techniques Goal 1, SLP)  compensatory strategies;during meal intake;small cup sips;small straw sips;small bites;food/liquid placed on stronger right side;alternate food/liquid intake;other (see comments)  -MM      [place]/Accuracy (Swallow Compensatory Strategies/Techniques Goal 1, SLP)  independently (over 90% accuracy)  -MM      Time Frame (Swallow Compensatory Strategies/Techniques Goal 1, SLP)  short term goal (STG);by discharge  -MM      Barriers (Swallow Compensatory Strategies/Techniques Goal 1, SLP)  medically complex  -MM      Progress/Outcomes (Swallow Compensatory Strategies/Techniques Goal 1, SLP)  continuing progress toward goal  -MM        User Key  (r) = Recorded By, (t) = Taken By, (c) = Cosigned By    Initials Name Provider Type Discipline    Silvina Lopez MS CCC-SLP Speech and Language Pathologist SLP        Occupational Therapy Education     Title: PT OT SLP Therapies (In Progress)     Topic: Occupational Therapy (In Progress)     Point: ADL training (Done)     Description: Instruct learner(s) on proper safety adaptation and remediation techniques during self care or transfers.   Instruct in proper use of assistive devices.    Learning Progress Summary           Patient Acceptance, E,TB, VU by  at 7/16/2019  9:33 AM    Comment:  OT POC, benefits of activity, discharge planning, ADLs, transfer safety   Family Acceptance, E,TB, VU by  at 7/16/2019  9:33 AM    Comment:  OT POC, benefits of activity, discharge planning, ADLs, transfer safety                   Point: Home exercise program (Done)     Description: Instruct learner(s) on appropriate technique for monitoring, assisting and/or progressing therapeutic exercises/activities.    Learning  Progress Summary           Patient Acceptance, E,TB, VU by  at 7/16/2019  9:33 AM    Comment:  OT POC, benefits of activity, discharge planning, ADLs, transfer safety   Family Acceptance, E,TB, VU by  at 7/16/2019  9:33 AM    Comment:  OT POC, benefits of activity, discharge planning, ADLs, transfer safety                               User Key     Initials Effective Dates Name Provider Type Discipline     04/02/19 -  Donna Hong, OTR/L, CNT Occupational Therapist OT                OT Recommendation and Plan  Outcome Summary/Treatment Plan (OT)  Daily Summary of Progress (OT): progress toward functional goals is good  Daily Summary of Progress (OT): progress toward functional goals is good  Plan of Care Review  Plan of Care Reviewed With: patient  Plan of Care Reviewed With: patient  Outcome Summary: Pt initially lethargic when tx began, per mother he had radiation this AM. Pt min A to come to sitting EOB. Pt SBA for transfers with RW. CGA for ambulation with RW due to decreased safety with manipulation of RW. Pt completed grooming tasks while seated in recliner. Pt would benefit from SNF for continued rehab. Continue OT POC   Outcome Measures     Row Name 07/18/19 1100 07/17/19 1100 07/17/19 1000       How much help from another person do you currently need...    Turning from your back to your side while in flat bed without using bedrails?  --  --  4  -MS (r) AL (t) MS (c)    Moving from lying on back to sitting on the side of a flat bed without bedrails?  --  --  3  -MS (r) AL (t) MS (c)    Moving to and from a bed to a chair (including a wheelchair)?  --  --  3  -MS (r) AL (t) MS (c)    Standing up from a chair using your arms (e.g., wheelchair, bedside chair)?  --  --  3  -MS (r) AL (t) MS (c)    Climbing 3-5 steps with a railing?  --  --  3  -MS (r) AL (t) MS (c)    To walk in hospital room?  --  --  3  -MS (r) AL (t) MS (c)    AM-PAC 6 Clicks Score (PT)  --  --  19  -MS (r) AL (t)       How much  help from another is currently needed...    Putting on and taking off regular lower body clothing?  3  -TS  3  -TS  --    Bathing (including washing, rinsing, and drying)  3  -TS  3  -TS  --    Toileting (which includes using toilet bed pan or urinal)  3  -TS  3  -TS  --    Putting on and taking off regular upper body clothing  3  -TS  3  -TS  --    Taking care of personal grooming (such as brushing teeth)  3  -TS  3  -TS  --    Eating meals  4  -TS  4  -TS  --    AM-PAC 6 Clicks Score (OT)  19  -TS  19  -TS  --       Functional Assessment    Outcome Measure Options  AM-PAC 6 Clicks Daily Activity (OT)  -TS  AM-PAC 6 Clicks Daily Activity (OT)  -TS  AM-PAC 6 Clicks Basic Mobility (PT)  -MS (r) AL (t) MS (c)    Row Name 07/16/19 0800 07/16/19 0750          How much help from another person do you currently need...    Turning from your back to your side while in flat bed without using bedrails?  3  -MS (r) AL (t) MS (c)  --     Moving from lying on back to sitting on the side of a flat bed without bedrails?  3  -MS (r) AL (t) MS (c)  --     Moving to and from a bed to a chair (including a wheelchair)?  3  -MS (r) AL (t) MS (c)  --     Standing up from a chair using your arms (e.g., wheelchair, bedside chair)?  3  -MS (r) AL (t) MS (c)  --     Climbing 3-5 steps with a railing?  3  -MS (r) AL (t) MS (c)  --     To walk in hospital room?  3  -MS (r) AL (t) MS (c)  --     AM-PAC 6 Clicks Score (PT)  18  -KB (r) AL (t)  --        How much help from another is currently needed...    Putting on and taking off regular lower body clothing?  --  3  -CS     Bathing (including washing, rinsing, and drying)  --  3  -CS     Toileting (which includes using toilet bed pan or urinal)  --  3  -CS     Putting on and taking off regular upper body clothing  --  3  -CS     Taking care of personal grooming (such as brushing teeth)  --  3  -CS     Eating meals  --  3  -CS     AM-PAC 6 Clicks Score (OT)  --  18  -CS        Functional  Assessment    Outcome Measure Options  AM-PAC 6 Clicks Basic Mobility (PT)  -MS (r) AL (t) MS (c)  AM-PAC 6 Clicks Daily Activity (OT)  -CS       User Key  (r) = Recorded By, (t) = Taken By, (c) = Cosigned By    Initials Name Provider Type    TS Enma Ellis, AGUILAR/L Occupational Therapy Assistant    Silvina Jarrell R, PT, DPT, NCS Physical Therapist    Donna Moore, OTR/L, CNT Occupational Therapist    Karen Ahumada RN Registered Nurse    AL Alan, Aj, PT Student PT Student           Time Calculation:   Time Calculation- OT     Row Name 07/18/19 1116             Time Calculation- OT    OT Start Time  1035  -TS      OT Stop Time  1105  -TS      OT Time Calculation (min)  30 min  -TS      Total Timed Code Minutes- OT  30 minute(s)  -TS      OT Received On  07/18/19  -TS         Timed Charges    27765 - OT Self Care/Mgmt Minutes  30  -TS        User Key  (r) = Recorded By, (t) = Taken By, (c) = Cosigned By    Initials Name Provider Type     Enma Ellis, AGUILAR/L Occupational Therapy Assistant        Therapy Charges for Today     Code Description Service Date Service Provider Modifiers Qty    88393282281 HC OT SELF CARE/MGMT/TRAIN EA 15 MIN 7/17/2019 Enma Ellis AGUILAR/L GO 2    97628446148 HC OT SELF CARE/MGMT/TRAIN EA 15 MIN 7/18/2019 Enma Ellis AGUILAR/L GO 2               Enma FORMAN. Rhonda AGUILAR/SRINIVAS  7/18/2019

## 2019-07-18 NOTE — PROGRESS NOTES
Continued Stay Note   [place]     Patient Name: Lukasz Savage  MRN: [id number]  Today's Date: 7/18/2019    Admit Date: 7/15/2019    Discharge Plan     Row Name 07/18/19 1023       Plan    Plan Comments  [facility] IS REVIEWING REFERRAL AND WILL HAVE AN ANSWER TODAY        Discharge Codes    No documentation.             MAYURI Cotto

## 2019-07-18 NOTE — PROGRESS NOTES
MEDICAL ONCOLOGY PROGRESS NOTE  Patient name: Lukasz Savage  Patient : 1965  VISIT # [id number]  MR #[id number]   Room 329    SUBJECTIVE: Antiseizure medication is being adjusted.  He did get radiation therapy yesterday.    INTERVAL HISTORY:  Mr. 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  He is completed 20 of 30 planned XRT treatments and was brought to radiation therapy yesterday in anticipation of dose #21 but reported that he had been more lethargic for 2 to 3 days.  He was taken to the emergency room for evaluation.  His mother reports that he has had no significant, obvious seizure activity but has had some trembling of the right upper extremity at times.     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 Doyle on 2019 referred by Dr. Trevino for diagnosis of primary brain tumor, grade 3 astrocytoma. Lukasz was being seen by neurology with complaints of left facial and upper extremity.  · 2019-MRI brain with contrast showed changes in the frontal convexity with a fullness of the sulcal gyral pattern. Specifically, 4.5 x 4.5 x 3.5 cm right frontal lesion. Second, discrete hyperintense nodule measuring 1.1 x 1.9 x 1.5 cm in the subcortical white matter of the paramedian posterior right frontal lobe immediately above the corpus callosum. This was concerning for high-grade glioma.   · 4/15/2019-he underwent a craniotomy with stereotactic biopsy by Dr. Dillon Trevino at [facility]. 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 Further  molecular analysis at [facility] revealed IDH1/2 wild type, MGMT non-methylated, TERT mutation. Complex cytogenetics changes A maximum safe resection was not possible due to concerns of significant sequela. Second opinion was recommended at the [facility].   · 5/17/2019-he was seen by Dr. Oral Jessica. Recommended concurrent chemoradiation.   · 5/24/2019-initial oncology consultation, Dr. Doyle recommended concurrent chemoradiation with Temodar.   · 6/3/2019. CT scan of the head without contrast documented to ill-defined masslike areas of increased attenuation within the right frontal lobe. Largest measuring 3.3 cm, previously measuring 1.8 cm and second lesion in the lateral aspect of the right frontal lobe measuring up to 1.5 cm. No intracranial hemorrhage or midline shift.   · 6/3/2019- MRI of the brain with and without contrast, compared to 4/2/2019 documented masslike appearance at the right frontal lobe measuring 2.9 x 1.4 cm, previously measuring 1.7 x 1.1 cm with mass effect and possible extension into the corpus callosum. No midline shift.   · 6/14/2019-CT scan cervical spine without contrast documented no evidence of acute bony injury. Multilevel disc degeneration spondylosis.   · 6/12/2019- Initiated Temodar along with radiation therapy   · 6/17/2019- MRI of the brain with and without contrast documented 3 cm enhancing, partially necrotic tumor in the both the right corpus callosum body, consistent with known astrocytoma. Additional FLAIR signal on both sides of the right central sulcus with faint contrast enhancement in the precentral gyrus, compatible with tumor extension. No hemorrhage or brain herniation.   · 6/21/2019 - CT scan of the head without contrast hypodense focus of the right frontal lobe measuring 3.3 cm. No intracranial hemorrhage. No abnormal extra-axial fluid collection. Right frontal craniotomy changes.         OBJECTIVE:    REVIEW OF SYSTEMS  CONSTITUTIONAL: no fever,  no night sweats, no fatigue;  HEENT: no blurring of vision, no double vision, no hearing difficulty, no tinnitus, no ulceration, no dental caries, and no dysplasia, no epistaxis;  LUNGS: no cough, no hemoptysis, no wheeze, no shortness of breath;  CARDIOVASCULAR: no palpitation, no chest pain, no shortness of breath;  GI: no abdominal pain, no nausea, no vomiting, no diarrhea, no constipation;  DIPAK: no dysuria, no hematuria, no frequency or urgency, no nephrolithiasis;  MUSCULOSKELETAL: no joint pain, no swelling, no stiffness;  ENDOCRINE: no polyuria, no polydipsia, no cold or heat intolerance;  HEMATOLOGY: no easy bruising or bleeding, no history of clotting disorder;  DERMATOLOGY: no skin rash, no eczema, no pruritus;  PSYCHIATRY: no depression, no anxiety, no panic attacks, no suicidal ideation, no homicidal ideation;  NEUROLOGY: no syncope, no seizures, no numbness or tingling of hands, no numbness or tingling of feet, no paresis;      Vitals:    07/18/19 0334   BP: 112/83   Pulse: 74   Resp: 14   Temp: 97.5 °F (36.4 °C)   SpO2: 95%       Intake/Output Summary (Last 24 hours) at 7/18/2019 0612  Last data filed at 7/17/2019 2123  Gross per 24 hour   Intake 200 ml   Output 200 ml   Net 0 ml       PHYSICAL EXAM:   CONSTITUTIONAL: Alert, appropriate, no acute distress, wearing CPAP  EYES: Non icteric, EOM intact, pupils equal round   ENT: Mucus membranes moist, no oral pharyngeal lesions, external inspection of ears and nose are normal  NECK: Supple, no masses.  No palpable thyroid mass  CHEST/LUNGS: CTA bilaterally, normal respiratory effort   CARDIOVASCULAR: RRR, no murmurs.  No lower extremity edema  ABDOMEN: soft non-tender, active bowel sounds, no HSM.  No palpable masses  EXTREMITIES: warm, full ROM in all 4 extremities, no focal weakness.  SKIN: warm, dry with no rashes or lesions  LYMPH: No cervical, clavicular, axillary, or inguinal lymphadenopathy  NEUROLOGIC: follows commands, non focal   PSYCH: mood and  affect appropriate.  Alert and oriented to time, place, person    CBC  Results from last 7 days   Lab Units 07/16/19  0510 07/15/19  1225   WBC 10*3/mm3 9.92 8.81   HEMOGLOBIN g/dL 13.7* 14.5   HEMATOCRIT % 42.2 45.1   PLATELETS 10*3/mm3 112* 124*         Lab Results   Component Value Date     07/17/2019    K 4.3 07/17/2019     07/17/2019    CO2 32.0 (H) 07/17/2019    BUN 34 (H) 07/17/2019    CREATININE 0.97 07/17/2019    GLUCOSE 98 07/17/2019    CALCIUM 8.7 07/17/2019    BILITOT 0.6 07/17/2019    ALKPHOS 54 07/17/2019    AST 51 (H) 07/17/2019    ALT 67 (H) 07/17/2019    AGRATIO 1.1 07/17/2019    GLOB 2.5 07/17/2019       Lab Results   Component Value Date    INR 0.94 06/21/2019    INR 0.98 06/03/2019    INR 0.94 03/22/2018    PROTIME 12.8 06/21/2019    PROTIME 13.3 06/03/2019    PROTIME 12.8 03/22/2018       Cultures:    Lab Results   Component Value Date    BLOODCX No growth at 5 days 06/15/2019     No components found for: URINCX    ASSESSMENT/PLAN:  CT HEAD WO CONTRAST- 7/15/2019 10:55 AM CDT     HISTORY: Fatigue     COMPARISON: CT head without contrast 06/29/2019     DOSE: 672 mGy-cm     TECHNIQUE: Sequential imaging was performed from the vertex through the  base of the skull without the use of IV contrast.  Sagittal and coronal  reformations were made from the original source data and reviewed.  Automated exposure control was also utilized to decrease patient  radiation dose.     FINDINGS:   There is redemonstration of a hyperdense lesion in the parafalcine left  frontal lobe which measures up to 3.2 cm in size, stable compared to the  previous exam. There is a right craniotomy defect with hyperdense  material noted in the brain parenchyma just deep to this, also stable  from the prior exam. There is no evidence of new intracranial  hemorrhage. There is no evidence of acute territorial infarct.  Ventricles appear normal in configuration. The basilar cisterns are  patent. Posterior fossa appears  grossly normal. Paranasal sinuses and  mastoid air cells appear clear.        IMPRESSION:  Stable appearance of the brain compared to the exam from  06/29/2019, with hyperdense masses in the right frontal lobe without  associated midline shift. Right craniotomy defect is noted with a  hyperdense area just deep to it, also stable from the previous exam.  This may represent an additional mass. No acute intracranial findings  are appreciated.     This report was finalized on 07/15/2019 10:58 by Dr. Driss Ward MD.     ASSESSMENT/PLAN:     Anaplastic grade 3 glioma     XRT to the brain -completed 22 of 30 planned thus far.  #23 should be given today.  Temodar 150 mg daily M-F with XRT - he did get his dose yesterday and will continue while getting XRT     CT head stable     CBC 7/16/2019  WBC 9.92  Hgb 13.7  ,000     Progressive lethargy     Valproic acid level 140.2 on admission 7/15/2019  -  66.5 this am          Antiseizure medication  Keppra 1250 mg IV every 12 hours  Lyrica 50 p.o. every 12 hours  - reduced and to be weaned OFF  Depacon 1000 mg IV every 8 hours    Antiseizure medication adjustment Per Dr. Keith Clark with medical oncology to discharge when stable medically.  Outpatient follow-up as previously arranged next week.      YEVGENIY Hammond    07/18/19  6:12 AM

## 2019-07-18 NOTE — PLAN OF CARE
Problem: Fall Risk (Adult)  Goal: Absence of Fall  Outcome: Ongoing (interventions implemented as appropriate)      Problem: Confusion, Acute (Adult)  Goal: Cognitive/Functional Impairments Minimized  Outcome: Ongoing (interventions implemented as appropriate)    Goal: Safety  Outcome: Ongoing (interventions implemented as appropriate)      Problem: Patient Care Overview  Goal: Plan of Care Review  Outcome: Ongoing (interventions implemented as appropriate)   07/18/19 1218   Coping/Psychosocial   Plan of Care Reviewed With patient;family   Plan of Care Review   Progress improving   OTHER   Outcome Summary Pt very alert today. Working well with physical therapy. Eating well. Taking pills whole in applesauce. Reposition for skin care.        Problem: Skin Injury Risk (Adult)  Goal: Skin Health and Integrity  Outcome: Ongoing (interventions implemented as appropriate)

## 2019-07-18 NOTE — THERAPY TREATMENT NOTE
Acute Care - Physical Therapy Treatment Note  [facility]     Patient Name: Lukasz Savage  : 1965  MRN: [id number]  Today's Date: 2019  Onset of Illness/Injury or Date of Surgery: 07/15/19  Date of Referral to PT: 07/15/19  Referring Physician: Dr. Webb    Admit Date: 7/15/2019    Visit Dx:    ICD-10-CM ICD-9-CM   1. Altered mental status, unspecified altered mental status type R41.82 780.97   2. Valproic acid toxicity, accidental or unintentional, initial encounter T42.6X1A 966.3     E855.0   3. Impaired functional mobility, balance, gait, and endurance Z74.09 V49.89   4. Dysphagia, unspecified type R13.10 787.20   5. Impaired mobility and ADLs Z74.09 799.89     Patient Active Problem List   Diagnosis   • HTN (hypertension)   • Seizures (CMS/HCC)   • Anemia   • Hypoglycemia   • Left hemiparesis (CMS/HCC)   • Elevated transaminase level   • Inoperable anaplastic glioma of brain    • Hyperammonemia (CMS/HCC)   • Altered mental status   • Metabolic encephalopathy   • Valproic acid toxicity, possible    • Left-sided weakness       Therapy Treatment    Rehabilitation Treatment Summary     Row Name 19 1116 19 1035 19 0949       Treatment Time/Intention    Discipline  physical therapy assistant  -NW  occupational therapy assistant  -TS  speech language pathologist  -MM    Document Type  therapy note (daily note)  -NW  therapy note (daily note)  -TS  therapy note (daily note)  -MM    Subjective Information  no complaints  -NW2  complains of;weakness;fatigue  -TS2  no complaints  -MM    Mode of Treatment  --  --  individual therapy;speech-language pathology  -MM    Patient/Family Observations  family present  -NW2  --  Mother present   -MM    Care Plan Review  --  --  care plan/treatment goals reviewed  -MM    Therapy Frequency (Swallow)  --  --  at least;3 days per week  -MM    Patient Effort  good  -NW2  good  -TS2  good  -MM    Comment  L side weakness  -NW2  pt initially lethargic  but did alert and participate with tx  -TS2  --    Existing Precautions/Restrictions  fall  -NW2  fall  -TS2  --    Recorded by [NW] Dulce Maria Mendez, PTA 07/18/19 1116  [NW2] Dulce Maria Mendez, PTA 07/18/19 1153 [TS] Enma Ellis AGUILAR/L 07/18/19 1036  [TS2] Enma Ellis AGUILAR/L 07/18/19 1111 [MM] Silvina Lake, MS CCC-SLP 07/18/19 1000    Row Name 07/18/19 1035             Cognitive Assessment/Intervention- PT/OT    Personal Safety Interventions  fall prevention program maintained;gait belt;nonskid shoes/slippers when out of bed  -TS      Recorded by [TS] Enma Ellis COTA/L 07/18/19 1111      Row Name 07/18/19 1116             Safety Issues, Functional Mobility    Safety Issues Affecting Function (Mobility)  awareness of need for assistance;safety precaution awareness  -NW      Impairments Affecting Function (Mobility)  coordination;strength  -NW      Recorded by [NW] Dulce Maria Mendez, PTA 07/18/19 1153      Row Name 07/18/19 1116 07/18/19 1035          Bed Mobility Assessment/Treatment    Bed Mobility Assessment/Treatment  --  supine-sit  -TS     Supine-Sit [place] (Bed Mobility)  --  minimum assist (75% patient effort);verbal cues  -TS     Assistive Device (Bed Mobility)  --  head of bed elevated;bed rails  -TS     Comment (Bed Mobility)  up in chair  -NW  --     Recorded by [NW] Dulce Maria Mendez, PTA 07/18/19 1153 [TS] Enma Ellis AGUILAR/L 07/18/19 1111     Row Name 07/18/19 1035             Functional Mobility    Functional Mobility- Ind. Level  contact guard assist  -TS      Functional Mobility- Device  rolling walker  -TS      Functional Mobility- Comment  in room, required assist with RW management  -TS      Recorded by [TS] Enma Ellis COTA/L 07/18/19 1111      Row Name 07/18/19 1035             Transfer Assessment/Treatment    Transfer Assessment/Treatment  sit-stand transfer;stand-sit transfer  -TS      Recorded by [TS] Enma Ellis AGUILAR/L  07/18/19 1111      Row Name 07/18/19 1116 07/18/19 1035          Sit-Stand Transfer    Sit-Stand [place] (Transfers)  verbal cues;contact guard  -NW  stand by assist;verbal cues  -TS     Assistive Device (Sit-Stand Transfers)  walker, front-wheeled  -NW  walker, front-wheeled  -TS     Recorded by [NW] Dulce Maria Mendez, PTA 07/18/19 1153 [TS] Enma Ellis COTA/L 07/18/19 1111     Row Name 07/18/19 1116 07/18/19 1035          Stand-Sit Transfer    Stand-Sit [place] (Transfers)  verbal cues;stand by assist  -NW  stand by assist  -TS     Assistive Device (Stand-Sit Transfers)  --  walker, front-wheeled  -TS     Recorded by [NW] Dulce Maria Mendez, PTA 07/18/19 1153 [TS] Enma Ellis AGUILAR/L 07/18/19 1111     Row Name 07/18/19 1116             Gait/Stairs Assessment/Training    [place] Level (Gait)  verbal cues;contact guard  -NW      Assistive Device (Gait)  walker, front-wheeled  -NW      Distance in Feet (Gait)  100 100x2  -NW      Deviations/Abnormal Patterns (Gait)  mile decreased;base of support, narrow;stride length decreased  -NW      Bilateral Gait Deviations  heel strike decreased  -NW      Comment (Gait/Stairs)  cues for safety  -NW      Recorded by [NW] Dulce Maria Mendez, PTA 07/18/19 1153      Row Name 07/18/19 1035             ADL Assessment/Intervention    BADL Assessment/Intervention  grooming  -TS      Recorded by [TS] Enma Ellis COTA/L 07/18/19 1111      Row Name 07/18/19 1035             Grooming Assessment/Training    [place] Level (Grooming)  wash face, hands;oral care regimen washed hair with wash cloth after set up   -TS      Grooming Position  supported sitting  -TS      Recorded by [TS] Enma Ellis COTA/L 07/18/19 1111      Row Name 07/18/19 1116             General ROM    GENERAL ROM COMMENTS  AROM BLE x20 sitting  -NW      Recorded by [NW] Dulce Maria Mendez, PTA 07/18/19 1153      Row Name 07/18/19 1116             Balance    Balance   dynamic balance activity  -NW      Recorded by [NW] Dulce Maria Mendez, PTA 07/18/19 1153      Row Name 07/18/19 1116             Dynamic Balance Activity    Therapeutic Training Performed (Dynamic Balance)  -- marching in place  -NW      Recorded by [NW] Dulce Maria Mendez, PTA 07/18/19 1153      Row Name 07/18/19 1116 07/18/19 1035          Positioning and Restraints    Pre-Treatment Position  sitting in chair/recliner  -NW  in bed  -TS     Post Treatment Position  chair  -NW  chair  -TS     In Chair  sitting;call light within reach;encouraged to call for assist;with family/caregiver  -NW  sitting;call light within reach;encouraged to call for assist;with other staff  -TS     Recorded by [NW] Dulce Maria Mendez, [facility] 07/18/19 1153 [TS] Enma Ellis AGUILAR/L 07/18/19 1111     Row Name 07/18/19 0949             Pain Assessment    Additional Documentation  Pain Scale: FACES Pre/Post-Treatment (Group)  -MM      Recorded by [MM] Silvina Lake MS CCC-SLP 07/18/19 1000      Row Name 07/18/19 1116 07/18/19 1035          Pain Scale: Numbers Pre/Post-Treatment    Pain Scale: Numbers, Pretreatment  0/10 - no pain  -NW  0/10 - no pain  -TS     Pain Scale: Numbers, Post-Treatment  --  0/10 - no pain  -TS     Recorded by [NW] Dulce Maria Mendez, [facility] 07/18/19 1153 [TS] Enma Ellis AGUILAR/L 07/18/19 1111     Row Name 07/18/19 0949             Pain Scale: FACES Pre/Post-Treatment    Pain: FACES Scale, Pretreatment  0-->no hurt  -MM      Pain: FACES Scale, Post-Treatment  0-->no hurt  -MM      Recorded by [MM] Silvina Lake MS CCC-SLP 07/18/19 1000      Row Name 07/18/19 0949             Outcome Summary/Treatment Plan (SLP)    Daily Summary of Progress (SLP)  progress toward functional goals is good  -MM      Barriers to Overall Progress (SLP)  n/a  -MM      Plan for Continued Treatment (SLP)  Continue to follow.  -MM      Anticipated Dischage Disposition  home with assist  -MM      Recorded by [MM] Aleksandra  Silvina SRINIVAS, MS CCC-SLP 07/18/19 1000        User Key  (r) = Recorded By, (t) = Taken By, (c) = Cosigned By    Initials Name Effective Dates Discipline    TS Enma Ellis, AGUILAR/L 08/02/16 -  OT    NW Dulce Maria Mendez, PTA 08/02/16 -  PT    MM AleksandraSilvina SRINIVAS, MS CCC-SLP 05/24/19 -  SLP               Rehab Goal Summary     Row Name 07/18/19 0949             Swallow Goals (SLP)    Oral Nutrition/Hydration Goal Selection (SLP)  oral nutrition/hydration, SLP goal 1  -MM      Swallow Compensatory Strategies Goal Selection (SLP)  swallow compensatory strategies, SLP goal 1  -MM      Additional Documentation  swallow compensatory strategies goal selection (SLP)  -MM         Oral Nutrition/Hydration Goal 1 (SLP)    Oral Nutrition/Hydration Goal 1, SLP  LTG:  Patient will tolerate Regular Diet with thin liquids using compensatory strategies to reduce pcketing on the left side of oral cavity as determined by SLP  -MM      Time Frame (Oral Nutrition/Hydration Goal 1, SLP)  by discharge  -MM      Barriers (Oral Nutrition/Hydration Goal 1, SLP)  medically complex  -MM      Progress/Outcomes (Oral Nutrition/Hydration Goal 1, SLP)  continuing progress toward goal  -MM         Swallow Compensatory Strategies Goal 1 (SLP)    Activity (Swallow Compensatory Strategies/Techniques Goal 1, SLP)  compensatory strategies;during meal intake;small cup sips;small straw sips;small bites;food/liquid placed on stronger right side;alternate food/liquid intake;other (see comments)  -MM      [place]/Accuracy (Swallow Compensatory Strategies/Techniques Goal 1, SLP)  independently (over 90% accuracy)  -MM      Time Frame (Swallow Compensatory Strategies/Techniques Goal 1, SLP)  short term goal (STG);by discharge  -MM      Barriers (Swallow Compensatory Strategies/Techniques Goal 1, SLP)  medically complex  -MM      Progress/Outcomes (Swallow Compensatory Strategies/Techniques Goal 1, SLP)  continuing progress toward goal  -MM         User Key  (r) = Recorded By, (t) = Taken By, (c) = Cosigned By    Initials Name Provider Type Discipline    Silvina Lopez MS CCC-SLP Speech and Language Pathologist SLP          Physical Therapy Education     Title: PT OT SLP Therapies (In Progress)     Topic: Physical Therapy (In Progress)     Point: Precautions (Done)     Learning Progress Summary           Patient Acceptance, JAIME VU by AL at 7/16/2019  8:54 AM    Comment:  PT educated patient on fall risk and needing assist from staff to get out of bed.                               User Key     Initials Effective Dates Name Provider Type Discipline    AL 04/24/19 -  Brandon Phillips, PT Student PT Student PT                PT Recommendation and Plan        Outcome Measures     Row Name 07/18/19 1100 07/17/19 1100 07/17/19 1000       How much help from another person do you currently need...    Turning from your back to your side while in flat bed without using bedrails?  4  -NW  --  4  -MS (r) AL (t) MS (c)    Moving from lying on back to sitting on the side of a flat bed without bedrails?  3  -NW  --  3  -MS (r) AL (t) MS (c)    Moving to and from a bed to a chair (including a wheelchair)?  3  -NW  --  3  -MS (r) AL (t) MS (c)    Standing up from a chair using your arms (e.g., wheelchair, bedside chair)?  3  -NW  --  3  -MS (r) AL (t) MS (c)    Climbing 3-5 steps with a railing?  3  -NW  --  3  -MS (r) AL (t) MS (c)    To walk in hospital room?  3  -NW  --  3  -MS (r) AL (t) MS (c)    AM-PAC 6 Clicks Score (PT)  19  -NW  --  19  -MS (r) AL (t)       How much help from another is currently needed...    Putting on and taking off regular lower body clothing?  3  -TS  3  -TS  --    Bathing (including washing, rinsing, and drying)  3  -TS  3  -TS  --    Toileting (which includes using toilet bed pan or urinal)  3  -TS  3  -TS  --    Putting on and taking off regular upper body clothing  3  -TS  3  -TS  --    Taking care of personal grooming (such as brushing  teeth)  3  -TS  3  -TS  --    Eating meals  4  -TS  4  -TS  --    AM-PAC 6 Clicks Score (OT)  19  -TS  19  -TS  --       Functional Assessment    Outcome Measure Options  AM-PAC 6 Clicks Daily Activity (OT)  -TS  AM-PAC 6 Clicks Daily Activity (OT)  -TS  AM-PAC 6 Clicks Basic Mobility (PT)  -MS (r) AL (t) MS (c)    Row Name 07/16/19 0800 07/16/19 0750          How much help from another person do you currently need...    Turning from your back to your side while in flat bed without using bedrails?  3  -MS (r) AL (t) MS (c)  --     Moving from lying on back to sitting on the side of a flat bed without bedrails?  3  -MS (r) AL (t) MS (c)  --     Moving to and from a bed to a chair (including a wheelchair)?  3  -MS (r) AL (t) MS (c)  --     Standing up from a chair using your arms (e.g., wheelchair, bedside chair)?  3  -MS (r) AL (t) MS (c)  --     Climbing 3-5 steps with a railing?  3  -MS (r) AL (t) MS (c)  --     To walk in hospital room?  3  -MS (r) AL (t) MS (c)  --     AM-PAC 6 Clicks Score (PT)  18  -KB (r) AL (t)  --        How much help from another is currently needed...    Putting on and taking off regular lower body clothing?  --  3  -CS     Bathing (including washing, rinsing, and drying)  --  3  -CS     Toileting (which includes using toilet bed pan or urinal)  --  3  -CS     Putting on and taking off regular upper body clothing  --  3  -CS     Taking care of personal grooming (such as brushing teeth)  --  3  -CS     Eating meals  --  3  -CS     AM-PAC 6 Clicks Score (OT)  --  18  -CS        Functional Assessment    Outcome Measure Options  AM-PAC 6 Clicks Basic Mobility (PT)  -MS (r) AL (t) MS (c)  AM-PAC 6 Clicks Daily Activity (OT)  -CS       User Key  (r) = Recorded By, (t) = Taken By, (c) = Cosigned By    Initials Name Provider Type    TS Enma Ellis, AGUILAR/L Occupational Therapy Assistant    Silvina Jarrell, PT, DPT, NCS Physical Therapist    Donna Moore, OTR/L, CNT Occupational  Therapist    NW Dulce Maria Mendez PTA Physical Therapy Assistant    Karen Ahumada RN Registered Nurse    Brandon Silver, PT Student PT Student         Time Calculation:   PT Charges     Row Name 07/18/19 1154             Time Calculation    Start Time  1116  -NW      Stop Time  1154  -NW      Time Calculation (min)  38 min  -NW      PT Non-Billable Time (min)  8 min  -NW      PT Received On  07/18/19  -NW      PT Goal Re-Cert Due Date  07/26/19  -NW         Time Calculation- PT    Total Timed Code Minutes- PT  30 minute(s)  -NW         Timed Charges    94118 - PT Therapeutic Exercise Minutes  10  -NW      98314 - Gait Training Minutes   20  -NW        User Key  (r) = Recorded By, (t) = Taken By, (c) = Cosigned By    Initials Name Provider Type    NW Dulce Maria Mendez PTA Physical Therapy Assistant        Therapy Charges for Today     Code Description Service Date Service Provider Modifiers Qty    44400141315 HC PT THER PROC EA 15 MIN 7/18/2019 Dulce Maria Mendez PTA GP 1    41037666853 HC GAIT TRAINING EA 15 MIN 7/18/2019 Dulce Maria Mendez PTA GP 1          PT G-Codes  Outcome Measure Options: AM-PAC 6 Clicks Daily Activity (OT)  AM-PAC 6 Clicks Score (PT): 19  AM-PAC 6 Clicks Score (OT): 19    Dulce Maria Mendez PTA  7/18/2019

## 2019-07-18 NOTE — THERAPY TREATMENT NOTE
Acute Care - Speech Language Pathology   Swallow Treatment Note [facility]     Patient Name: Lukasz Savage  : 1965  MRN: [id number]  Today's Date: 2019  Onset of Illness/Injury or Date of Surgery: 07/15/19     Referring Physician: Dr. Webb      Admit Date: 7/15/2019  Swallow treatment completed. Patient lethargic but cooperative. RN reports no concerns with toleration of diet or meds. Patient completed trials of thin liquids and regular solids. Mild residue in left lateral sulci. Given minimal cues the patient was able to use a lingual sweep to clear. OK to continue a regular diet with thin liquids using compensatory strategies. SLP will follow as needed.   Silvina Lake, MS CCC-SLP 2019 10:04 AM    Visit Dx:      ICD-10-CM ICD-9-CM   1. Altered mental status, unspecified altered mental status type R41.82 780.97   2. Valproic acid toxicity, accidental or unintentional, initial encounter T42.6X1A 966.3     E855.0   3. Impaired functional mobility, balance, gait, and endurance Z74.09 V49.89   4. Dysphagia, unspecified type R13.10 787.20   5. Impaired mobility and ADLs Z74.09 799.89     Patient Active Problem List   Diagnosis   • HTN (hypertension)   • Seizures (CMS/HCC)   • Anemia   • Hypoglycemia   • Left hemiparesis (CMS/HCC)   • Elevated transaminase level   • Inoperable anaplastic glioma of brain    • Hyperammonemia (CMS/HCC)   • Altered mental status   • Metabolic encephalopathy   • Valproic acid toxicity, possible    • Left-sided weakness       Therapy Treatment  Rehabilitation Treatment Summary     Row Name 19 0949             Treatment Time/Intention    Discipline  speech language pathologist  -MM      Document Type  therapy note (daily note)  -MM      Subjective Information  no complaints  -MM      Mode of Treatment  individual therapy;speech-language pathology  -MM      Patient/Family Observations  Mother present   -MM      Care Plan Review  care plan/treatment goals  reviewed  -MM      Therapy Frequency (Swallow)  at least;3 days per week  -MM      Patient Effort  good  -MM      Recorded by [MM] Silvina Lake MS CCC-SLP 07/18/19 1000      Row Name 07/18/19 0949             Pain Assessment    Additional Documentation  Pain Scale: FACES Pre/Post-Treatment (Group)  -MM      Recorded by [MM] Silvina Lake MS CCC-SLP 07/18/19 1000      Row Name 07/18/19 0949             Pain Scale: FACES Pre/Post-Treatment    Pain: FACES Scale, Pretreatment  0-->no hurt  -MM      Pain: FACES Scale, Post-Treatment  0-->no hurt  -MM      Recorded by [MM] Silvina Lake MS CCC-SLP 07/18/19 1000      Row Name 07/18/19 0949             Outcome Summary/Treatment Plan (SLP)    Daily Summary of Progress (SLP)  progress toward functional goals is good  -MM      Barriers to Overall Progress (SLP)  n/a  -MM      Plan for Continued Treatment (SLP)  Continue to follow.  -MM      Anticipated Dischage Disposition  home with assist  -MM      Recorded by [MM] Silvina Lake MS CCC-SLP 07/18/19 1000        User Key  (r) = Recorded By, (t) = Taken By, (c) = Cosigned By    Initials Name Effective Dates Discipline    MM Silvina Lake MS CCC-SLP 05/24/19 -  SLP          Outcome Summary  Outcome Summary/Treatment Plan (SLP)  Daily Summary of Progress (SLP): progress toward functional goals is good (07/18/19 0949 : Silvina Lake, MS CCC-SLP)  Barriers to Overall Progress (SLP): n/a (07/18/19 0949 : Silvina Lake, MS CCC-SLP)  Plan for Continued Treatment (SLP): Continue to follow. (07/18/19 0949 : Silvina Lake, MS CCC-SLP)  Anticipated Dischage Disposition: home with assist (07/18/19 0949 : Silvina Lake, [facility])      SLP GOALS     Row Name 07/18/19 0949 07/17/19 1345 07/16/19 0850       Oral Nutrition/Hydration Goal 1 (SLP)    Oral Nutrition/Hydration Goal 1, SLP  LTG:  Patient will tolerate Regular Diet with thin liquids using compensatory strategies to reduce  pcketing on the left side of oral cavity as determined by SLP  -MM  LTG:  Patient will tolerate Regular Diet with thin liquids using compensatory strategies to reduce pcketing on the left side of oral cavity as determined by SLP  -KW  LTG:  Patient will tolerate Regular Diet with thin liquids using compensatory strategies to reduce pcketing on the left side of oral cavity as determined by SLP  -KW    Time Frame (Oral Nutrition/Hydration Goal 1, SLP)  by discharge  -MM  by discharge  -KW  by discharge  -KW    Barriers (Oral Nutrition/Hydration Goal 1, SLP)  medically complex  -MM  medically complex  -KW  medically complex  -KW    Progress/Outcomes (Oral Nutrition/Hydration Goal 1, SLP)  continuing progress toward goal  -MM  continuing progress toward goal  -KW  goal ongoing  -KW       Swallow Compensatory Strategies Goal 1 (SLP)    Activity (Swallow Compensatory Strategies/Techniques Goal 1, SLP)  compensatory strategies;during meal intake;small cup sips;small straw sips;small bites;food/liquid placed on stronger right side;alternate food/liquid intake;other (see comments)  -MM  compensatory strategies;during meal intake;small cup sips;small straw sips;small bites;food/liquid placed on stronger right side;alternate food/liquid intake;other (see comments)  -KW  compensatory strategies;during meal intake;small cup sips;small straw sips;small bites;food/liquid placed on stronger right side;alternate food/liquid intake;other (see comments) lingual clearing  -KW    [place]/Accuracy (Swallow Compensatory Strategies/Techniques Goal 1, SLP)  independently (over 90% accuracy)  -MM  independently (over 90% accuracy)  -KW  independently (over 90% accuracy)  -KW    Time Frame (Swallow Compensatory Strategies/Techniques Goal 1, SLP)  short term goal (STG);by discharge  -MM  short term goal (STG);by discharge  -KW  short term goal (STG);by discharge  -KW    Barriers (Swallow Compensatory Strategies/Techniques Goal 1, SLP)   medically complex  -MM  medically complex  -KW  medically complex  -KW    Progress/Outcomes (Swallow Compensatory Strategies/Techniques Goal 1, SLP)  continuing progress toward goal  -MM  continuing progress toward goal  -KW  goal ongoing  -KW      User Key  (r) = Recorded By, (t) = Taken By, (c) = Cosigned By    Initials Name Provider Type    Daniela Lee MS CCC-SLP Speech and Language Pathologist    Silvina Lopez MS CCC-SLP Speech and Language Pathologist          EDUCATION  The patient has been educated in the following areas:   Dysphagia (Swallowing Impairment) Oral Care/Hydration.    SLP Recommendation and Plan  Daily Summary of Progress (SLP): progress toward functional goals is good  Barriers to Overall Progress (SLP): n/a  Plan for Continued Treatment (SLP): Continue to follow.  Anticipated Dischage Disposition: home with assist                    Time Calculation:   Time Calculation- SLP     Row Name 07/18/19 1003             Time Calculation- SLP    SLP Start Time  0949  -MM      SLP Stop Time  0957  -MM      SLP Time Calculation (min)  8 min  -MM      SLP Received On  07/18/19  -MM        User Key  (r) = Recorded By, (t) = Taken By, (c) = Cosigned By    Initials Name Provider Type    Silvina Lopez MS CCC-SLP Speech and Language Pathologist          Therapy Charges for Today     Code Description Service Date Service Provider Modifiers Qty    83858513188 HC ST TREATMENT SWALLOW 1 7/18/2019 Silvina Lake MS CCC-SLP GN 1                 Silvina Lake MS CCC-ALEXIA  7/18/2019

## 2019-07-18 NOTE — PROGRESS NOTES
[facility] Medicine Services  INPATIENT PROGRESS NOTE    Patient Name: Lukasz Savage  Date of Admission: 7/15/2019  Today's Date: 07/18/19  Length of Stay: 3  Primary Care Physician: Malia Vargas MD    Subjective   Chief Complaint: fatigue  HPI     Patient was seen and examined at bedside.  Patient was a coming by his mother at bedside.  Plan is to discharge him home with home health.  I expressed my concerns of his mother and his daughter trying to lift him into the car and get him to radiation.  Also continue to address my concerns of him returning to the hospital within the next few days after discharge.  They indicated that there are more family members willing to help.  Indicates that they feel as though they can take care of things at home.  Patient overall feels well, his appetite is great.        Review of Systems   Constitutional: Positive for fatigue. Negative for activity change, appetite change, chills, diaphoresis and fever.   Respiratory: Negative for cough and shortness of breath.    Cardiovascular: Negative for chest pain and palpitations.   Gastrointestinal: Negative for abdominal distention, abdominal pain, constipation, diarrhea, nausea and vomiting.   Neurological: Negative for weakness.        All pertinent negatives and positives are as above. All other systems have been reviewed and are negative unless otherwise stated.     Objective    Temp:  [97.4 °F (36.3 °C)-98.4 °F (36.9 °C)] 97.6 °F (36.4 °C)  Heart Rate:  [69-86] 86  Resp:  [12-22] 14  BP: (112-132)/() 132/105  FiO2 (%):  [21 %] 21 %  Physical Exam  Constitutional: No distress. Room air.  Mother at bedside.   HENT:   Head: Normocephalic and atraumatic.   Eyes: Conjunctivae are normal. No scleral icterus.   Neck: Neck supple. No JVD present.   Cardiovascular: Normal rate and regular rhythm. Exam reveals no gallop and no friction rub.   No murmur heard.  Pulmonary/Chest: Effort  normal and breath sounds normal. No stridor. No respiratory distress. He has no wheezes.   Abdominal: Soft. Bowel sounds are normal. He exhibits no distension. There is no tenderness. There is no guarding.   Musculoskeletal: He exhibits no edema.   Neurological:   Awake and alert; AO x 3; Follows commands.  Left-side 4/5 strength today   Skin: Skin is warm and dry. He is not diaphoretic. No erythema.   Psychiatric: He has a normal mood and affect. His behavior is normal.   Nursing note and vitals reviewed.          Results Review:  I have reviewed the labs, radiology results, and diagnostic studies.    Laboratory Data:   Results from last 7 days   Lab Units 07/16/19  0510 07/15/19  1225   WBC 10*3/mm3 9.92 8.81   HEMOGLOBIN g/dL 13.7* 14.5   HEMATOCRIT % 42.2 45.1   PLATELETS 10*3/mm3 112* 124*        Results from last 7 days   Lab Units 07/18/19  0828 07/17/19  0627 07/16/19  0510   SODIUM mmol/L 139 138 141   POTASSIUM mmol/L 4.1 4.3 4.9   CHLORIDE mmol/L 102 101 104   CO2 mmol/L 30.0 32.0* 31.0   BUN mg/dL 28* 34* 40*   CREATININE mg/dL 1.00 0.97 1.14   CALCIUM mg/dL 8.8 8.7 9.2   BILIRUBIN mg/dL 0.5 0.6 0.7   ALK PHOS U/L 66 54 53   ALT (SGPT) U/L 72* 67* 61*   AST (SGOT) U/L 40 51* 34   GLUCOSE mg/dL 93 98 74       Culture Data:        Radiology Data:   Imaging Results (last 24 hours)     ** No results found for the last 24 hours. **          I have reviewed the patient's current medications.     Assessment/Plan     Active Hospital Problems    Diagnosis   • **Metabolic encephalopathy   • Valproic acid toxicity, possible    • Left-sided weakness   • Hyperammonemia (CMS/HCC)   • Inoperable anaplastic glioma of brain    • Elevated transaminase level   • Anemia   • Seizures (CMS/HCC)       Plan:  1.  Continue to monitor on telemetry   2.  Continue carnitine while inpatient  3.  LFTs stable  4.  CPAP at night, needs to do at home   5.  Will discharge on decadron 2 mg q8h - Tapering per oncology at discharge   6.   Case discussed with Dr. Arroyo have requested final recommendations on AEDs, lyrica taper, and home health labs to be obtains (VPA level) in preparation for discharge tomorrow  7.  PO lactulose  8.  Will resume home health at discharge  9.  Oncology following, appreciate assistance  10.  AEDs per neurology   11.  AM CMP, VPA levels per neurology              Discharge Planning: I expect the patient to be discharged to home with home health in 1 days.    Jairon Webb MD   07/18/19   1:35 PM

## 2019-07-18 NOTE — PROGRESS NOTES
Continued Stay Note   [place]     Patient Name: Lukasz Savage  MRN: [id number]  Today's Date: 7/18/2019    Admit Date: 7/15/2019    Discharge Plan     Row Name 07/18/19 1434       Plan    Plan Comments  [facility] DECLINED DUE TO COST OF CHEMO MEDICATION AND THEY WILL NOT ALLOW FOR FAMILY TO BRING IN CHEMO PILL. SPOKE TO BOTH PT MOTHER (MARLIN) AND PT DTR (LILIANE) AND THEY BOTH HAVE AGREED TO TAKE PT HOME AT DC WITH HOME HEALTH. THEY DO NOT WANT TO ANOTHER NURSING FACILITY. THEY WOULD LIKE FOR PT TO BE DCD HOME TOMORROW AFTER XRT (IF PT IS READY FOR DC). WILL NEED HOME HEALTH ORDERS. PT WAS ALREADY FOLLOWED BY [facility].         Discharge Codes    No documentation.             MAYURI Cotto

## 2019-07-18 NOTE — PLAN OF CARE
Problem: Patient Care Overview  Goal: Plan of Care Review  Outcome: Ongoing (interventions implemented as appropriate)   07/18/19 1111   Coping/Psychosocial   Plan of Care Reviewed With patient   Plan of Care Review   Progress improving   OTHER   Outcome Summary Pt initially lethargic when tx began, per mother he had radiation this AM. Pt min A to come to sitting EOB. Pt SBA for transfers with RW. CGA for ambulation with RW due to decreased safety with manipulation of RW. Pt completed grooming tasks while seated in recliner. Pt would benefit from SNF for continued rehab. Continue OT POC

## 2019-07-18 NOTE — PROGRESS NOTES
Neurology progress note:    Plan for discharge tomorrow  · Continue Lyrica 50mg Q12H for 7 days then discontinue  · Continue Depakote 1000mg TID  · Recheck Trough Valproic Acid Level in 5 days  · Recheck CMP in 5 days including liver function tests  · Recheck ammonia level in 5 days    Harpreet Arroyo MD  07/18/19  3:32 PM

## 2019-07-19 ENCOUNTER — TELEPHONE (OUTPATIENT)
Dept: FAMILY MEDICINE CLINIC | Facility: CLINIC | Age: 54
End: 2019-07-19

## 2019-07-19 VITALS
HEIGHT: 68 IN | RESPIRATION RATE: 18 BRPM | DIASTOLIC BLOOD PRESSURE: 85 MMHG | BODY MASS INDEX: 29.04 KG/M2 | TEMPERATURE: 98.4 F | HEART RATE: 78 BPM | WEIGHT: 191.6 LBS | OXYGEN SATURATION: 96 % | SYSTOLIC BLOOD PRESSURE: 111 MMHG

## 2019-07-19 DIAGNOSIS — K21.9 GASTROESOPHAGEAL REFLUX DISEASE WITHOUT ESOPHAGITIS: ICD-10-CM

## 2019-07-19 LAB
ALBUMIN SERPL-MCNC: 2.8 G/DL (ref 3.5–5)
ALBUMIN/GLOB SERPL: 1.1 G/DL (ref 1.1–2.5)
ALP SERPL-CCNC: 65 U/L (ref 24–120)
ALT SERPL W P-5'-P-CCNC: 67 U/L (ref 0–54)
ANION GAP SERPL CALCULATED.3IONS-SCNC: 4 MMOL/L (ref 4–13)
AST SERPL-CCNC: 44 U/L (ref 7–45)
BILIRUB SERPL-MCNC: 0.5 MG/DL (ref 0.1–1)
BUN BLD-MCNC: 28 MG/DL (ref 5–21)
BUN/CREAT SERPL: 32.2 (ref 7–25)
CALCIUM SPEC-SCNC: 8.6 MG/DL (ref 8.4–10.4)
CHLORIDE SERPL-SCNC: 105 MMOL/L (ref 98–110)
CO2 SERPL-SCNC: 28 MMOL/L (ref 24–31)
CREAT BLD-MCNC: 0.87 MG/DL (ref 0.5–1.4)
GFR SERPL CREATININE-BSD FRML MDRD: 92 ML/MIN/1.73
GLOBULIN UR ELPH-MCNC: 2.6 GM/DL
GLUCOSE BLD-MCNC: 73 MG/DL (ref 70–100)
POTASSIUM BLD-SCNC: 4.4 MMOL/L (ref 3.5–5.3)
PROT SERPL-MCNC: 5.4 G/DL (ref 6.3–8.7)
SODIUM BLD-SCNC: 137 MMOL/L (ref 135–145)
VALPROATE SERPL-MCNC: 92.1 MCG/ML (ref 50–100)

## 2019-07-19 PROCEDURE — 77412 RADIATION TX DELIVERY LVL 3: CPT | Performed by: RADIOLOGY

## 2019-07-19 PROCEDURE — 77280 THER RAD SIMULAJ FIELD SMPL: CPT | Performed by: RADIOLOGY

## 2019-07-19 PROCEDURE — 80053 COMPREHEN METABOLIC PANEL: CPT | Performed by: INTERNAL MEDICINE

## 2019-07-19 PROCEDURE — 94660 CPAP INITIATION&MGMT: CPT

## 2019-07-19 PROCEDURE — 80164 ASSAY DIPROPYLACETIC ACD TOT: CPT | Performed by: PSYCHIATRY & NEUROLOGY

## 2019-07-19 PROCEDURE — 94799 UNLISTED PULMONARY SVC/PX: CPT

## 2019-07-19 PROCEDURE — 92526 ORAL FUNCTION THERAPY: CPT | Performed by: SPEECH-LANGUAGE PATHOLOGIST

## 2019-07-19 RX ORDER — DEXAMETHASONE 2 MG/1
2 TABLET ORAL 2 TIMES DAILY WITH MEALS
Qty: 14 TABLET | Refills: 0 | Status: SHIPPED | OUTPATIENT
Start: 2019-07-19 | End: 2019-07-26

## 2019-07-19 RX ORDER — DIVALPROEX SODIUM 500 MG/1
1000 TABLET, DELAYED RELEASE ORAL EVERY 8 HOURS SCHEDULED
Qty: 90 TABLET | Refills: 0 | Status: SHIPPED | OUTPATIENT
Start: 2019-07-19 | End: 2019-09-13 | Stop reason: SDUPTHER

## 2019-07-19 RX ORDER — PREGABALIN 50 MG/1
50 CAPSULE ORAL 2 TIMES DAILY
Qty: 14 CAPSULE | Refills: 0 | Status: SHIPPED | OUTPATIENT
Start: 2019-07-19 | End: 2019-07-26

## 2019-07-19 RX ADMIN — Medication 100 MG: at 09:19

## 2019-07-19 RX ADMIN — SULFAMETHOXAZOLE AND TRIMETHOPRIM 160 MG: 800; 160 TABLET ORAL at 09:20

## 2019-07-19 RX ADMIN — DEXAMETHASONE 2 MG: 2 TABLET ORAL at 05:49

## 2019-07-19 RX ADMIN — VENLAFAXINE HYDROCHLORIDE 150 MG: 75 CAPSULE, EXTENDED RELEASE ORAL at 09:19

## 2019-07-19 RX ADMIN — LACTULOSE 20 G: 20 SOLUTION ORAL at 09:19

## 2019-07-19 RX ADMIN — LACOSAMIDE 150 MG: 50 TABLET, FILM COATED ORAL at 09:20

## 2019-07-19 RX ADMIN — PANTOPRAZOLE SODIUM 40 MG: 40 TABLET, DELAYED RELEASE ORAL at 05:49

## 2019-07-19 RX ADMIN — SODIUM CHLORIDE, PRESERVATIVE FREE 3 ML: 5 INJECTION INTRAVENOUS at 09:21

## 2019-07-19 RX ADMIN — DIVALPROEX SODIUM 1000 MG: 500 TABLET, DELAYED RELEASE ORAL at 05:49

## 2019-07-19 RX ADMIN — LEVOCARNITINE 500 MG: 1 SOLUTION ORAL at 09:21

## 2019-07-19 RX ADMIN — LEVETIRACETAM 1250 MG: 500 TABLET, FILM COATED ORAL at 09:20

## 2019-07-19 RX ADMIN — PREGABALIN 50 MG: 50 CAPSULE ORAL at 09:20

## 2019-07-19 NOTE — PAYOR COMM NOTE
"[place] 7-19-19  [id number]    Lukasz Wahl Mary (54 y.o. Male)     Date of Birth Social Security Number Address Home Phone MRN    1965  [address]  [place] [address] [phone] [id number]    Adventism Marital Status          [facility] Single       Admission Date Admission Type Admitting Provider Attending Provider Department, Room/Bed    7/15/19 Emergency Jairon Webb MD  [facility] 3A, 329/1    Discharge Date Discharge Disposition Discharge Destination        7/19/2019 Home-Health Care [facility]              Attending Provider:  (none)   Allergies:  Lipitor [Atorvastatin], Lisinopril    Isolation:  None   Infection:  C.difficile (07/07/17)   Code Status:  CPR    Ht:  172.7 cm (68\")   Wt:  86.9 kg (191 lb 9.6 oz)    Admission Cmt:  None   Principal Problem:  Metabolic encephalopathy [G93.41]             DC HOME 7-19-19    [id number]  Active Insurance as of 7/15/2019     Primary Coverage     Payor Plan Insurance Group Employer/Plan Group    [employer] [place] [employer] [place]      Payor Plan Address Payor Plan Phone Number Payor Plan Fax Number Effective Dates    [address] [address]   7/1/2017 - None Entered    [place] [address]       Subscriber Name Subscriber Birth Date Member ID       LUKASZ WAHL MARY 1965 [id number]                 Emergency Contacts      (Rel.) Home Phone Work Phone Mobile Phone    Ivonne Wahl (Mother) [phone] -- --    Yvette Pollack (Daughter) [phone] -- [phone]            Discharge Summary     No notes of this type exist for this encounter.        Timmy Pratt PA   Physician Assistant   Oncology   Progress Notes   Attested   Date of Service:  7/19/2019  6:36 AM   Creation Time:  7/19/2019  6:36 AM            Attested        Attestation signed by Luis Miguel Doyle MD at 7/19/2019 10:54 AM   I have seen, examined and reviewed this patient medication list, appropriate labs and imaging " studies. I reviewed relevant medical records and others physician's notes. I discussed the plans of care with the patient. I answered all the questions to the patient's satisfaction  (Please note that portions of this note were completed with a voice recognition program. Efforts were made to edit the dictations but occasionally words are mis-transcribed.)  The patient is back to his baseline in terms of mental status.  He has no more seizures.  Doing well.  He is going home today.  He will follow-up with me in clinic in 2 weeks.                  Show:Clear all  [x]Manual[x]Template[x]Copied    Added by:  [x]Timmy Pratt PA      []Jose for details            MEDICAL ONCOLOGY PROGRESS NOTE  Patient name: Lukasz Savage  Patient : 1965  VISIT # [id number]  MR #[id number]   Room 329     SUBJECTIVE: Antiseizure medication is being adjusted.  He continues getting XRT plus his Temodar     INTERVAL HISTORY:  Mr. Savage is a very pleasant but unfortunate 54-year-old  male with grade 3 anaplastic glioma who is currently receiving combination Temodar and XRT to the brain.  He is completed 20 of 30 planned XRT treatments and was brought to radiation therapy yesterday in anticipation of dose #21 but reported that he had been more lethargic for 2 to 3 days.  He was taken to the emergency room for evaluation.  His mother reports that he has had no significant, obvious seizure activity but has had some trembling of the right upper extremity at times.     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 Doyle on 2019 referred by Dr. Trevino for diagnosis of primary brain tumor, grade 3 astrocytoma. Lukasz was  being seen by neurology with complaints of left facial and upper extremity.  · 4/2/2019-MRI brain with contrast showed changes in the frontal convexity with a fullness of the sulcal gyral pattern. Specifically, 4.5 x 4.5 x 3.5 cm right frontal lesion. Second, discrete hyperintense nodule measuring 1.1 x 1.9 x 1.5 cm in the subcortical white matter of the paramedian posterior right frontal lobe immediately above the corpus callosum. This was concerning for high-grade glioma.   · 4/15/2019-he underwent a craniotomy with stereotactic biopsy by Dr. Dillon Trevino at [facility]. 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 Further molecular analysis at [facility] revealed IDH1/2 wild type, MGMT non-methylated, TERT mutation. Complex cytogenetics changes A maximum safe resection was not possible due to concerns of significant sequela. Second opinion was recommended at the [facility].   · 5/17/2019-he was seen by Dr. Oral Jessica. Recommended concurrent chemoradiation.   · 5/24/2019-initial oncology consultation, Dr. Doyle recommended concurrent chemoradiation with Temodar.   · 6/3/2019. CT scan of the head without contrast documented to ill-defined masslike areas of increased attenuation within the right frontal lobe. Largest measuring 3.3 cm, previously measuring 1.8 cm and second lesion in the lateral aspect of the right frontal lobe measuring up to 1.5 cm. No intracranial hemorrhage or midline shift.   · 6/3/2019- MRI of the brain with and without contrast, compared to 4/2/2019 documented masslike appearance at the right frontal lobe measuring 2.9 x 1.4 cm, previously measuring 1.7 x 1.1 cm with mass effect and possible extension into the corpus callosum. No midline shift.   · 6/14/2019-CT scan cervical spine without contrast documented no evidence of acute bony injury. Multilevel disc degeneration spondylosis.   · 6/12/2019- Initiated Temodar along with radiation  therapy   · 6/17/2019- MRI of the brain with and without contrast documented 3 cm enhancing, partially necrotic tumor in the both the right corpus callosum body, consistent with known astrocytoma. Additional FLAIR signal on both sides of the right central sulcus with faint contrast enhancement in the precentral gyrus, compatible with tumor extension. No hemorrhage or brain herniation.   · 6/21/2019 - CT scan of the head without contrast hypodense focus of the right frontal lobe measuring 3.3 cm. No intracranial hemorrhage. No abnormal extra-axial fluid collection. Right frontal craniotomy changes.           OBJECTIVE:     REVIEW OF SYSTEMS  CONSTITUTIONAL: no fever  HEENT: no epistaxis  LUNGS: no cough, no hemoptysis, no wheeze, no shortness of breath;  CARDIOVASCULAR: no palpitation, no chest pain, no shortness of breath;  GI: no abdominal pain, no nausea, no vomiting, no diarrhea, no constipation;  DIPAK: no dysuria, no hematuria, no frequency or urgency, no nephrolithiasis;  MUSCULOSKELETAL: no joint pain, no swelling, no stiffness;  ENDOCRINE: no polyuria, no polydipsia, no cold or heat intolerance;  HEMATOLOGY: no easy bruising or bleeding, no history of clotting disorder;  DERMATOLOGY: no skin rash, no eczema, no pruritus;  PSYCHIATRY: no suicidal ideation, no homicidal ideation;  NEUROLOGY: He continues with left-sided weakness.         Vitals:     07/19/19 0426   BP: 118/86   Pulse: 77   Resp: 16   Temp: 98.3 °F (36.8 °C)   SpO2: 96%         Intake/Output Summary (Last 24 hours) at 7/19/2019 0636  Last data filed at 7/18/2019 2014      Gross per 24 hour   Intake 680 ml   Output --   Net 680 ml      CONSTITUTIONAL: NAD, interactive this morning, talkative  EYES: Non icteric  ENT: Mucus membranes dry  NECK: Supple, no masses.  No palpable thyroid mass  CHEST/LUNGS: CTA bilaterally, normal respiratory effort   CARDIOVASCULAR: RRR, no murmurs.  No lower extremity edema  ABDOMEN: soft non-tender, active bowel  sounds, no HSM.  No palpable masses  EXTREMITIES: No edema  SKIN: warm, dry with no rashes or lesions  LYMPH: No cervical, clavicular, axillary, or inguinal lymphadenopathy  NEUROLOGIC: Chronic left-sided weakness, interactive, answering questions.  PSYCH: mood and affect appropriate.        CBC        Results from last 7 days   Lab Units 07/16/19  0510 07/15/19  1225   WBC 10*3/mm3 9.92 8.81   HEMOGLOBIN g/dL 13.7* 14.5   HEMATOCRIT % 42.2 45.1   PLATELETS 10*3/mm3 112* 124*                  Lab Results   Component Value Date      07/18/2019     K 4.1 07/18/2019      07/18/2019     CO2 30.0 07/18/2019     BUN 28 (H) 07/18/2019     CREATININE 1.00 07/18/2019     GLUCOSE 93 07/18/2019     CALCIUM 8.8 07/18/2019     BILITOT 0.5 07/18/2019     ALKPHOS 66 07/18/2019     AST 40 07/18/2019     ALT 72 (H) 07/18/2019     AGRATIO 1.2 07/18/2019     GLOB 2.5 07/18/2019               Lab Results   Component Value Date     INR 0.94 06/21/2019     INR 0.98 06/03/2019     INR 0.94 03/22/2018     PROTIME 12.8 06/21/2019     PROTIME 13.3 06/03/2019     PROTIME 12.8 03/22/2018         Cultures:           Lab Results   Component Value Date     BLOODCX No growth at 5 days 06/15/2019      No components found for: URINCX     ASSESSMENT/PLAN:  CT HEAD WO CONTRAST- 7/15/2019 10:55 AM CDT     HISTORY: Fatigue     COMPARISON: CT head without contrast 06/29/2019     DOSE: 672 mGy-cm     TECHNIQUE: Sequential imaging was performed from the vertex through the  base of the skull without the use of IV contrast.  Sagittal and coronal  reformations were made from the original source data and reviewed.  Automated exposure control was also utilized to decrease patient  radiation dose.     FINDINGS:   There is redemonstration of a hyperdense lesion in the parafalcine left  frontal lobe which measures up to 3.2 cm in size, stable compared to the  previous exam. There is a right craniotomy defect with hyperdense  material noted in the brain  parenchyma just deep to this, also stable  from the prior exam. There is no evidence of new intracranial  hemorrhage. There is no evidence of acute territorial infarct.  Ventricles appear normal in configuration. The basilar cisterns are  patent. Posterior fossa appears grossly normal. Paranasal sinuses and  mastoid air cells appear clear.        IMPRESSION:  Stable appearance of the brain compared to the exam from  06/29/2019, with hyperdense masses in the right frontal lobe without  associated midline shift. Right craniotomy defect is noted with a  hyperdense area just deep to it, also stable from the previous exam.  This may represent an additional mass. No acute intracranial findings  are appreciated.     This report was finalized on 07/15/2019 10:58 by Dr. Driss Ward MD.     ASSESSMENT/PLAN:     Anaplastic grade 3 glioma     XRT to the brain -completed 23 of 30 planned thus far.  #24 should be given today.  Temodar 150 mg daily M-F with XRT - he did get his dose yesterday and will continue while getting XRT     CT head stable     CBC 7/16/2019  WBC 9.92  Hgb 13.7  ,000     Progressive lethargy     Valproic acid level 140.2 on admission 7/15/2019  -  74.2 on 7/18/19 at 1257 hrs           Antiseizure medication  Keppra 1250 mg IV every 12 hours  Lyrica 50 p.o. every 12 hours  - reduced and to be weaned OFF  Depacon 1000 mg IV every 8 hours     Antiseizure medication adjustment Per Dr. Keith Clark with medical oncology to discharge when stable medically.  Outpatient follow-up as previously arranged next week.  OP f/u arranged.        YEVGENIY Hammond     07/19/19  6:36 AM                         Cosigned by: Luis Miguel Doyle MD at 7/19/2019 10:54 AM   Revision History

## 2019-07-19 NOTE — PROGRESS NOTES
Continued Stay Note   [place]     Patient Name: Lukasz Savage  MRN: [id number]  Today's Date: 7/19/2019    Admit Date: 7/15/2019    Discharge Plan     Row Name 07/19/19 0949       Plan    Final Discharge Disposition Code  06 - home with home health care    Final Note  Pt is being discharged home with family today with resumption orders for HH. Arlen from [facility] is aware of discharge. SW will follow for any additional discharge needs that may arise.         Discharge Codes    No documentation.       Expected Discharge Date and Time     Expected Discharge Date Expected Discharge Time    Jul 19, 2019             Evy De Anda

## 2019-07-19 NOTE — THERAPY DISCHARGE NOTE
Acute Care - Physical Therapy Discharge Summary  [facility]       Patient Name: Lukasz Savage  : 1965  MRN: [id number]    Today's Date: 2019  Onset of Illness/Injury or Date of Surgery: 07/15/19    Date of Referral to PT: 07/15/19  Referring Physician: Dr. Webb      Admit Date: 7/15/2019      PT Recommendation and Plan    Visit Dx:    ICD-10-CM ICD-9-CM   1. Altered mental status, unspecified altered mental status type R41.82 780.97   2. Valproic acid toxicity, accidental or unintentional, initial encounter T42.6X1A 966.3     E855.0   3. Impaired functional mobility, balance, gait, and endurance Z74.09 V49.89   4. Dysphagia, unspecified type R13.10 787.20   5. Impaired mobility and ADLs Z74.09 799.89   6. Elevated transaminase level R74.0 790.4   7. Metabolic encephalopathy G93.41 348.31   8. Left-sided weakness R53.1 728.87       Outcome Measures     Row Name 19 1100 19 1100 19 1000       How much help from another person do you currently need...    Turning from your back to your side while in flat bed without using bedrails?  4  -NW  --  4  -MS (r) AL (t) MS (c)    Moving from lying on back to sitting on the side of a flat bed without bedrails?  3  -NW  --  3  -MS (r) AL (t) MS (c)    Moving to and from a bed to a chair (including a wheelchair)?  3  -NW  --  3  -MS (r) AL (t) MS (c)    Standing up from a chair using your arms (e.g., wheelchair, bedside chair)?  3  -NW  --  3  -MS (r) AL (t) MS (c)    Climbing 3-5 steps with a railing?  3  -NW  --  3  -MS (r) AL (t) MS (c)    To walk in hospital room?  3  -NW  --  3  -MS (r) AL (t) MS (c)    AM-PAC 6 Clicks Score (PT)  19  -NW  --  19  -MS (r) AL (t)       How much help from another is currently needed...    Putting on and taking off regular lower body clothing?  3  -TS  3  -TS  --    Bathing (including washing, rinsing, and drying)  3  -TS  3  -TS  --    Toileting (which includes using toilet bed pan or urinal)  3  -TS  3   -TS  --    Putting on and taking off regular upper body clothing  3  -TS  3  -TS  --    Taking care of personal grooming (such as brushing teeth)  3  -TS  3  -TS  --    Eating meals  4  -TS  4  -TS  --    AM-PAC 6 Clicks Score (OT)  19  -TS  19  -TS  --       Functional Assessment    Outcome Measure Options  AM-PAC 6 Clicks Daily Activity (OT)  -TS  AM-PAC 6 Clicks Daily Activity (OT)  -TS  AM-PAC 6 Clicks Basic Mobility (PT)  -MS (r) AL (t) MS (c)      User Key  (r) = Recorded By, (t) = Taken By, (c) = Cosigned By    Initials Name Provider Type    TS Enma Ellis N, AGUILAR/L Occupational Therapy Assistant    Silvina Jarrell, PT, DPT, NCS Physical Therapist    Dulce Maria Laird, PTA Physical Therapy Assistant    AL Alan, Aj, PT Student PT Student              Rehab Goal Summary     Row Name 07/19/19 1310 07/19/19 0815          Physical Therapy Goals    Bed Mobility Goal Selection (PT)  bed mobility, PT goal 1  -AB  --     Transfer Goal Selection (PT)  transfer, PT goal 1  -AB  --     Gait Training Goal Selection (PT)  gait training, PT goal 1  -AB  --     Balance Goal Selection (PT)  balance, PT goal 1  -AB  --     Stairs Goal Selection (PT)  stairs, PT goal 1  -AB  --        Bed Mobility Goal 1 (PT)    Activity/Assistive Device (Bed Mobility Goal 1, PT)  rolling to left;rolling to right;sit to supine;supine to sit  -AB  --     [place] Level/Cues Needed (Bed Mobility Goal 1, PT)  conditional independence  -AB  --     Time Frame (Bed Mobility Goal 1, PT)  long term goal (LTG);by discharge  -AB  --     Progress/Outcomes (Bed Mobility Goal 1, PT)  goal not met  -AB  --        Transfer Goal 1 (PT)    Activity/Assistive Device (Transfer Goal 1, PT)  sit-to-stand/stand-to-sit;bed-to-chair/chair-to-bed  -AB  --     [place] Level/Cues Needed (Transfer Goal 1, PT)  supervision required  -AB  --     Time Frame (Transfer Goal 1, PT)  long term goal (LTG);by discharge  -AB  --     Progress/Outcome (Transfer  Goal 1, PT)  goal not met  -AB  --        Gait Training Goal 1 (PT)    Activity/Assistive Device (Gait Training Goal 1, PT)  gait (walking locomotion);assistive device use;backward stepping;decrease fall risk;forward stepping;diminish gait deviation;increase endurance/gait distance;improve balance and speed;sidestepping;turning, left;turning, right;walker, rolling with and without assist device pending progress  -AB  --     [place] Level (Gait Training Goal 1, PT)  contact guard assist  -AB  --     Distance (Gait Goal 1, PT)  Pt will ambulate 100ft on even surfaces with no bouts of LOB  -AB  --     Time Frame (Gait Training Goal 1, PT)  long term goal (LTG);by discharge  -AB  --     Progress/Outcome (Gait Training Goal 1, PT)  goal met  -AB  --        Balance Goal 1 (PT)    Activity/Assistive Device (Balance Goal 1, PT)  sitting, dynamic;standing, static;standing, dynamic  -AB  --     [place] Level/Cues Needed (Balance Goal 1, PT)  supervision required  -AB  --     Time Frame (Balance Goal 1, PT)  long term goal (LTG);by discharge  -AB  --     Progress/Outcomes (Balance Goal 1, PT)  goal not met  -AB  --        Stairs Goal 1 (PT)    Activity/Assistive Device (Stairs Goal 1, PT)  stairs, all skills;ascending stairs;descending stairs;using handrail, right;step-to-step;decrease fall risk;improve balance and speed  -AB  --     [place] Level/Cues Needed (Stairs Goal 1, PT)  contact guard assist  -AB  --     Number of Stairs (Stairs Goal 1, PT)  2  -AB  --     Time Frame (Stairs Goal 1, PT)  long term goal (LTG);by discharge  -AB  --     Progress/Outcome (Stairs Goal 1, PT)  goal not met  -AB  --        Oral Nutrition/Hydration Goal 1 (SLP)    Oral Nutrition/Hydration Goal 1, SLP  --  LTG:  Patient will tolerate Regular Diet with thin liquids using compensatory strategies to reduce pcketing on the left side of oral cavity as determined by SLP  -KW     Time Frame (Oral Nutrition/Hydration Goal 1, SLP)  --   by discharge  -KW     Barriers (Oral Nutrition/Hydration Goal 1, SLP)  --  medically complex  -KW     Progress/Outcomes (Oral Nutrition/Hydration Goal 1, SLP)  --  goal met  -KW        Swallow Compensatory Strategies Goal 1 (SLP)    Activity (Swallow Compensatory Strategies/Techniques Goal 1, SLP)  --  compensatory strategies;during meal intake;small cup sips;small straw sips;small bites;food/liquid placed on stronger right side;alternate food/liquid intake;other (see comments)  -KW     [place]/Accuracy (Swallow Compensatory Strategies/Techniques Goal 1, SLP)  --  independently (over 90% accuracy)  -KW     Time Frame (Swallow Compensatory Strategies/Techniques Goal 1, SLP)  --  short term goal (STG);by discharge  -KW     Barriers (Swallow Compensatory Strategies/Techniques Goal 1, SLP)  --  medically complex  -KW     Progress/Outcomes (Swallow Compensatory Strategies/Techniques Goal 1, SLP)  --  goal met  -KW       User Key  (r) = Recorded By, (t) = Taken By, (c) = Cosigned By    Initials Name Provider Type Discipline    Malia Marroquin, PTA Physical Therapy Assistant PT    Daniela Lee, MS CCC-SLP Speech and Language Pathologist SLP              PT Discharge Summary  Anticipated Discharge Disposition (PT): home with home health  Reason for Discharge: Discharge from facility  Outcomes Achieved: Refer to plan of care for updates on goals achieved  Discharge Destination: Home with home health      Malia Roy PTA   7/19/2019

## 2019-07-19 NOTE — PROGRESS NOTES
MEDICAL ONCOLOGY PROGRESS NOTE  Patient name: Lukasz Savage  Patient : 1965  VISIT # [id number]  MR #[id number]   Room 329    SUBJECTIVE: Antiseizure medication is being adjusted.  He continues getting XRT plus his Temodar    INTERVAL HISTORY:  Mr. 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  He is completed 20 of 30 planned XRT treatments and was brought to radiation therapy yesterday in anticipation of dose #21 but reported that he had been more lethargic for 2 to 3 days.  He was taken to the emergency room for evaluation.  His mother reports that he has had no significant, obvious seizure activity but has had some trembling of the right upper extremity at times.     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 Doyle on 2019 referred by Dr. Trevino for diagnosis of primary brain tumor, grade 3 astrocytoma. Lukasz was being seen by neurology with complaints of left facial and upper extremity.  · 2019-MRI brain with contrast showed changes in the frontal convexity with a fullness of the sulcal gyral pattern. Specifically, 4.5 x 4.5 x 3.5 cm right frontal lesion. Second, discrete hyperintense nodule measuring 1.1 x 1.9 x 1.5 cm in the subcortical white matter of the paramedian posterior right frontal lobe immediately above the corpus callosum. This was concerning for high-grade glioma.   · 4/15/2019-he underwent a craniotomy with stereotactic biopsy by Dr. Dillon Trevino at [facility]. 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 Complex cytogenetics changes A maximum safe resection was not possible due to concerns of significant sequela. Second opinion was recommended at the [facility].   · 5/17/2019-he was seen by Dr. Oral Jessica. Recommended concurrent chemoradiation.   · 5/24/2019-initial oncology consultation, Dr. Doyle recommended concurrent chemoradiation with Temodar.   · 6/3/2019. CT scan of the head without contrast documented to ill-defined masslike areas of increased attenuation within the right frontal lobe. Largest measuring 3.3 cm, previously measuring 1.8 cm and second lesion in the lateral aspect of the right frontal lobe measuring up to 1.5 cm. No intracranial hemorrhage or midline shift.   · 6/3/2019- MRI of the brain with and without contrast, compared to 4/2/2019 documented masslike appearance at the right frontal lobe measuring 2.9 x 1.4 cm, previously measuring 1.7 x 1.1 cm with mass effect and possible extension into the corpus callosum. No midline shift.   · 6/14/2019-CT scan cervical spine without contrast documented no evidence of acute bony injury. Multilevel disc degeneration spondylosis.   · 6/12/2019- Initiated Temodar along with radiation therapy   · 6/17/2019- MRI of the brain with and without contrast documented 3 cm enhancing, partially necrotic tumor in the both the right corpus callosum body, consistent with known astrocytoma. Additional FLAIR signal on both sides of the right central sulcus with faint contrast enhancement in the precentral gyrus, compatible with tumor extension. No hemorrhage or brain herniation.   · 6/21/2019 - CT scan of the head without contrast hypodense focus of the right frontal lobe measuring 3.3 cm. No intracranial hemorrhage. No abnormal extra-axial fluid collection. Right frontal craniotomy changes.         OBJECTIVE:    REVIEW OF SYSTEMS  CONSTITUTIONAL: no  fever  HEENT: no epistaxis  LUNGS: no cough, no hemoptysis, no wheeze, no shortness of breath;  CARDIOVASCULAR: no palpitation, no chest pain, no shortness of breath;  GI: no abdominal pain, no nausea, no vomiting, no diarrhea, no constipation;  DIPAK: no dysuria, no hematuria, no frequency or urgency, no nephrolithiasis;  MUSCULOSKELETAL: no joint pain, no swelling, no stiffness;  ENDOCRINE: no polyuria, no polydipsia, no cold or heat intolerance;  HEMATOLOGY: no easy bruising or bleeding, no history of clotting disorder;  DERMATOLOGY: no skin rash, no eczema, no pruritus;  PSYCHIATRY: no suicidal ideation, no homicidal ideation;  NEUROLOGY: He continues with left-sided weakness.     Vitals:    07/19/19 0426   BP: 118/86   Pulse: 77   Resp: 16   Temp: 98.3 °F (36.8 °C)   SpO2: 96%       Intake/Output Summary (Last 24 hours) at 7/19/2019 0636  Last data filed at 7/18/2019 2014  Gross per 24 hour   Intake 680 ml   Output --   Net 680 ml     CONSTITUTIONAL: NAD, interactive this morning, talkative  EYES: Non icteric  ENT: Mucus membranes dry  NECK: Supple, no masses.  No palpable thyroid mass  CHEST/LUNGS: CTA bilaterally, normal respiratory effort   CARDIOVASCULAR: RRR, no murmurs.  No lower extremity edema  ABDOMEN: soft non-tender, active bowel sounds, no HSM.  No palpable masses  EXTREMITIES: No edema  SKIN: warm, dry with no rashes or lesions  LYMPH: No cervical, clavicular, axillary, or inguinal lymphadenopathy  NEUROLOGIC: Chronic left-sided weakness, interactive, answering questions.  PSYCH: mood and affect appropriate.      CBC  Results from last 7 days   Lab Units 07/16/19  0510 07/15/19  1225   WBC 10*3/mm3 9.92 8.81   HEMOGLOBIN g/dL 13.7* 14.5   HEMATOCRIT % 42.2 45.1   PLATELETS 10*3/mm3 112* 124*         Lab Results   Component Value Date     07/18/2019    K 4.1 07/18/2019     07/18/2019    CO2 30.0 07/18/2019    BUN 28 (H) 07/18/2019    CREATININE 1.00 07/18/2019    GLUCOSE 93 07/18/2019     CALCIUM 8.8 07/18/2019    BILITOT 0.5 07/18/2019    ALKPHOS 66 07/18/2019    AST 40 07/18/2019    ALT 72 (H) 07/18/2019    AGRATIO 1.2 07/18/2019    GLOB 2.5 07/18/2019       Lab Results   Component Value Date    INR 0.94 06/21/2019    INR 0.98 06/03/2019    INR 0.94 03/22/2018    PROTIME 12.8 06/21/2019    PROTIME 13.3 06/03/2019    PROTIME 12.8 03/22/2018       Cultures:    Lab Results   Component Value Date    BLOODCX No growth at 5 days 06/15/2019     No components found for: URINCX    ASSESSMENT/PLAN:  CT HEAD WO CONTRAST- 7/15/2019 10:55 AM CDT     HISTORY: Fatigue     COMPARISON: CT head without contrast 06/29/2019     DOSE: 672 mGy-cm     TECHNIQUE: Sequential imaging was performed from the vertex through the  base of the skull without the use of IV contrast.  Sagittal and coronal  reformations were made from the original source data and reviewed.  Automated exposure control was also utilized to decrease patient  radiation dose.     FINDINGS:   There is redemonstration of a hyperdense lesion in the parafalcine left  frontal lobe which measures up to 3.2 cm in size, stable compared to the  previous exam. There is a right craniotomy defect with hyperdense  material noted in the brain parenchyma just deep to this, also stable  from the prior exam. There is no evidence of new intracranial  hemorrhage. There is no evidence of acute territorial infarct.  Ventricles appear normal in configuration. The basilar cisterns are  patent. Posterior fossa appears grossly normal. Paranasal sinuses and  mastoid air cells appear clear.        IMPRESSION:  Stable appearance of the brain compared to the exam from  06/29/2019, with hyperdense masses in the right frontal lobe without  associated midline shift. Right craniotomy defect is noted with a  hyperdense area just deep to it, also stable from the previous exam.  This may represent an additional mass. No acute intracranial findings  are appreciated.     This report was  finalized on 07/15/2019 10:58 by Dr. Driss Ward MD.     ASSESSMENT/PLAN:     Anaplastic grade 3 glioma     XRT to the brain -completed 23 of 30 planned thus far.  #24 should be given today.  Temodar 150 mg daily M-F with XRT - he did get his dose yesterday and will continue while getting XRT     CT head stable     CBC 7/16/2019  WBC 9.92  Hgb 13.7  ,000     Progressive lethargy     Valproic acid level 140.2 on admission 7/15/2019  -  74.2 on 7/18/19 at 1257 hrs          Antiseizure medication  Keppra 1250 mg IV every 12 hours  Lyrica 50 p.o. every 12 hours  - reduced and to be weaned OFF  Depacon 1000 mg IV every 8 hours    Antiseizure medication adjustment Per Dr. Keith Clark with medical oncology to discharge when stable medically.  Outpatient follow-up as previously arranged next week.  OP f/u arranged.      YEVGENIY Hammond    07/19/19  6:36 AM

## 2019-07-19 NOTE — DISCHARGE SUMMARY
[facility] Medicine Services  DISCHARGE SUMMARY       Date of Admission: 7/15/2019  Date of Discharge:  7/19/2019  Primary Care Physician: Malia Vargas MD    Presenting Problem/History of Present Illness:  Encephalopathy    Final Discharge Diagnoses:  Active Hospital Problems    Diagnosis   • **Metabolic encephalopathy   • Valproic acid toxicity, possible    • Left-sided weakness   • Hyperammonemia (CMS/HCC)   • Inoperable anaplastic glioma of brain    • Elevated transaminase level   • Anemia   • Seizures (CMS/HCC)       Consults: Oncology, neurology    Procedures Performed: None    Pertinent Test Results:   Imaging Results (last 7 days)     Procedure Component Value Units Date/Time    CT Head Without Contrast [[id number]] Collected:  07/15/19 1055     Updated:  07/15/19 1102    Narrative:       EXAMINATION: CT HEAD WO CONTRAST- 7/15/2019 10:55 AM CDT     HISTORY: Fatigue     COMPARISON: CT head without contrast 06/29/2019     DOSE: 672 mGy-cm     TECHNIQUE: Sequential imaging was performed from the vertex through the  base of the skull without the use of IV contrast.  Sagittal and coronal  reformations were made from the original source data and reviewed.  Automated exposure control was also utilized to decrease patient  radiation dose.     FINDINGS:   There is redemonstration of a hyperdense lesion in the parafalcine left  frontal lobe which measures up to 3.2 cm in size, stable compared to the  previous exam. There is a right craniotomy defect with hyperdense  material noted in the brain parenchyma just deep to this, also stable  from the prior exam. There is no evidence of new intracranial  hemorrhage. There is no evidence of acute territorial infarct.  Ventricles appear normal in configuration. The basilar cisterns are  patent. Posterior fossa appears grossly normal. Paranasal sinuses and  mastoid air cells appear clear. Of          Impression:       Stable  appearance of the brain compared to the exam from  06/29/2019, with hyperdense masses in the right frontal lobe without  associated midline shift. Right craniotomy defect is noted with a  hyperdense area just deep to it, also stable from the previous exam.  This may represent an additional mass. No acute intracranial findings  are appreciated.     This report was finalized on 07/15/2019 10:58 by Dr. Driss Ward MD.        Lab Results (last 7 days)     Procedure Component Value Units Date/Time    Valproic Acid Level, Total [[id number]]  (Normal) Collected:  07/19/19 0547    Specimen:  Blood Updated:  07/19/19 0727     Valproic Acid 92.1 mcg/mL     Comprehensive Metabolic Panel [[id number]]  (Abnormal) Collected:  07/19/19 0547    Specimen:  Blood Updated:  07/19/19 0644     Glucose 73 mg/dL      BUN 28 mg/dL      Creatinine 0.87 mg/dL      Sodium 137 mmol/L      Potassium 4.4 mmol/L      Chloride 105 mmol/L      CO2 28.0 mmol/L      Calcium 8.6 mg/dL      Total Protein 5.4 g/dL      Albumin 2.80 g/dL      ALT (SGPT) 67 U/L      AST (SGOT) 44 U/L      Alkaline Phosphatase 65 U/L      Total Bilirubin 0.5 mg/dL      eGFR Non African Amer 92 mL/min/1.73      Globulin 2.6 gm/dL      A/G Ratio 1.1 g/dL      BUN/Creatinine Ratio 32.2     Anion Gap 4.0 mmol/L     Narrative:       GFR Normal >60  Chronic Kidney Disease <60  Kidney Failure <15    Valproic Acid Level, Total [[id number]]  (Normal) Collected:  07/18/19 1257    Specimen:  Blood Updated:  07/18/19 1345     Valproic Acid 74.2 mcg/mL     Valproic Acid Level, Total [[id number]]  (Abnormal) Collected:  07/18/19 0828    Specimen:  Blood Updated:  07/18/19 0917     Valproic Acid 128.4 mcg/mL     Comprehensive Metabolic Panel [[id number]]  (Abnormal) Collected:  07/18/19 0828    Specimen:  Blood Updated:  07/18/19 0911     Glucose 93 mg/dL      BUN 28 mg/dL      Creatinine 1.00 mg/dL      Sodium 139 mmol/L      Potassium 4.1 mmol/L      Chloride 102 mmol/L      CO2 30.0  mmol/L      Calcium 8.8 mg/dL      Total Protein 5.4 g/dL      Albumin 2.90 g/dL      ALT (SGPT) 72 U/L      AST (SGOT) 40 U/L      Alkaline Phosphatase 66 U/L      Total Bilirubin 0.5 mg/dL      eGFR Non African Amer 78 mL/min/1.73      Globulin 2.5 gm/dL      A/G Ratio 1.2 g/dL      BUN/Creatinine Ratio 28.0     Anion Gap 7.0 mmol/L     Narrative:       GFR Normal >60  Chronic Kidney Disease <60  Kidney Failure <15    Valproic Acid Level, Total [[id number]]  (Normal) Collected:  07/17/19 1800    Specimen:  Blood Updated:  07/17/19 1829     Valproic Acid 66.5 mcg/mL     Valproic Acid Level, Total [[id number]]  (Abnormal) Collected:  07/17/19 0627    Specimen:  Blood Updated:  07/17/19 0830     Valproic Acid 100.1 mcg/mL     Comprehensive Metabolic Panel [[id number]]  (Abnormal) Collected:  07/17/19 0627    Specimen:  Blood Updated:  07/17/19 0658     Glucose 98 mg/dL      BUN 34 mg/dL      Creatinine 0.97 mg/dL      Sodium 138 mmol/L      Potassium 4.3 mmol/L      Chloride 101 mmol/L      CO2 32.0 mmol/L      Calcium 8.7 mg/dL      Total Protein 5.3 g/dL      Albumin 2.80 g/dL      ALT (SGPT) 67 U/L      AST (SGOT) 51 U/L      Alkaline Phosphatase 54 U/L      Total Bilirubin 0.6 mg/dL      eGFR Non African Amer 81 mL/min/1.73      Globulin 2.5 gm/dL      A/G Ratio 1.1 g/dL      BUN/Creatinine Ratio 35.1     Anion Gap 5.0 mmol/L     Narrative:       GFR Normal >60  Chronic Kidney Disease <60  Kidney Failure <15    Urine Drug Screen - Urine, Clean Catch [[id number]]  (Normal) Collected:  07/16/19 1015    Specimen:  Urine, Clean Catch Updated:  07/16/19 1056     Amphetamine Screen, Urine Negative     Barbiturates Screen, Urine Negative     Benzodiazepine Screen, Urine Negative     Cocaine Screen, Urine Negative     Methadone Screen, Urine Negative     Opiate Screen Negative     Phencyclidine (PCP), Urine Negative     THC, Screen, Urine Negative    Narrative:       Negative Thresholds For Drugs Screened in  Urine:    Amphetamines          500 ng/ml  Barbiturates          200 ng/ml  Benzodiazepines       200 ng/ml  Cocaine               150 ng/ml  Methadone             150 ng/ml  Opiates               300 ng/ml  Phencyclidine         25 ng/ml  THC                      50 ng/ml    The normal value for all drugs tested is negative. This report includes final unconfirmed screening results.  A positive result by this assay can be, at your request, sent to the Reference Lab for confirmation by gas chromatography. Unconfirmed results must not be used for non-medical purposes, such as employment or legal testing. Clinical consideration should be applied to any drug of abuse test result, particularly when unconfirmed results are used.    Valproic Acid Level, Total [[id number]]  (Abnormal) Collected:  07/16/19 0510    Specimen:  Blood Updated:  07/16/19 0613     Valproic Acid 24.1 mcg/mL     Comprehensive Metabolic Panel [[id number]]  (Abnormal) Collected:  07/16/19 0510    Specimen:  Blood Updated:  07/16/19 0612     Glucose 74 mg/dL      BUN 40 mg/dL      Creatinine 1.14 mg/dL      Sodium 141 mmol/L      Potassium 4.9 mmol/L      Chloride 104 mmol/L      CO2 31.0 mmol/L      Calcium 9.2 mg/dL      Total Protein 5.6 g/dL      Albumin 2.90 g/dL      ALT (SGPT) 61 U/L      AST (SGOT) 34 U/L      Alkaline Phosphatase 53 U/L      Total Bilirubin 0.7 mg/dL      eGFR Non African Amer 67 mL/min/1.73      Globulin 2.7 gm/dL      A/G Ratio 1.1 g/dL      BUN/Creatinine Ratio 35.1     Anion Gap 6.0 mmol/L     Narrative:       GFR Normal >60  Chronic Kidney Disease <60  Kidney Failure <15    Ammonia [[id number]]  (Abnormal) Collected:  07/16/19 0510    Specimen:  Blood Updated:  07/16/19 0601     Ammonia <9 umol/L     CBC (No Diff) [[id number]]  (Abnormal) Collected:  07/16/19 0510    Specimen:  Blood Updated:  07/16/19 0553     WBC 9.92 10*3/mm3      RBC 4.72 10*6/mm3      Hemoglobin 13.7 g/dL      Hematocrit 42.2 %      MCV 89.4 fL      MCH  29.0 pg      MCHC 32.5 g/dL      RDW 16.3 %      RDW-SD 53.4 fl      MPV 11.8 fL      Platelets 112 10*3/mm3     Vitamin B12 [[id number]]  (Normal) Collected:  07/15/19 1103    Specimen:  Blood Updated:  07/15/19 1805     Vitamin B-12 845 pg/mL     Valproic Acid Level, Total [[id number]]  (Normal) Collected:  07/15/19 1742    Specimen:  Blood Updated:  07/15/19 1803     Valproic Acid 80.7 mcg/mL     Magnesium [[id number]]  (Normal) Collected:  07/15/19 1225    Specimen:  Blood Updated:  07/15/19 1720     Magnesium 2.2 mg/dL     Comprehensive Metabolic Panel [[id number]]  (Abnormal) Collected:  07/15/19 1225    Specimen:  Blood Updated:  07/15/19 1326     Glucose 71 mg/dL      BUN 37 mg/dL      Creatinine 1.10 mg/dL      Sodium 140 mmol/L      Potassium 4.7 mmol/L      Chloride 102 mmol/L      CO2 29.0 mmol/L      Calcium 8.9 mg/dL      Total Protein 5.9 g/dL      Albumin 3.00 g/dL      ALT (SGPT) 61 U/L      AST (SGOT) 46 U/L      Alkaline Phosphatase 62 U/L      Total Bilirubin 0.4 mg/dL      eGFR Non African Amer 70 mL/min/1.73      Globulin 2.9 gm/dL      A/G Ratio 1.0 g/dL      BUN/Creatinine Ratio 33.6     Anion Gap 9.0 mmol/L     Narrative:       GFR Normal >60  Chronic Kidney Disease <60  Kidney Failure <15    CBC & Differential [[id number]] Collected:  07/15/19 1225    Specimen:  Blood Updated:  07/15/19 1254    Narrative:       The following orders were created for panel order CBC & Differential.  Procedure                               Abnormality         Status                     ---------                               -----------         ------                     CBC Auto Differential[[id number]]        Abnormal            Final result                 Please view results for these tests on the individual orders.    CBC Auto Differential [[id number]]  (Abnormal) Collected:  07/15/19 1225    Specimen:  Blood Updated:  07/15/19 1254     WBC 8.81 10*3/mm3      RBC 4.96 10*6/mm3      Hemoglobin 14.5 g/dL       "Hematocrit 45.1 %      MCV 90.9 fL      MCH 29.2 pg      MCHC 32.2 g/dL      RDW 17.0 %      RDW-SD 55.3 fl      MPV 11.1 fL      Platelets 124 10*3/mm3      Neutrophil % 81.0 %      Lymphocyte % 13.7 %      Monocyte % 3.5 %      Eosinophil % 0.0 %      Basophil % 0.2 %      Neutrophils, Absolute 7.13 10*3/mm3      Lymphocytes, Absolute 1.21 10*3/mm3      Monocytes, Absolute 0.31 10*3/mm3      Eosinophils, Absolute 0.00 10*3/mm3      Basophils, Absolute 0.02 10*3/mm3     Valproic Acid Level, Total [[id number]]  (Abnormal) Collected:  07/15/19 1103    Specimen:  Blood Updated:  07/15/19 1203     Valproic Acid 140.2 mcg/mL         Hospital Course:  The patient is a 54 y.o. male who presented to [facility] with encephalopathy.      HPI from 7/15/19:  \"Mr. Savage is a 53 yo M with an inoperable anaplastic glioma of the brain, recurrent seizures on multiple AEDs, and persistent left-sided weakness.  Patient presented today for radiation treatment and was found to be confused, lethargic and worsening speech.  Patient has been sleeping a lot more lately.  Daughter and mother provide most of the history.  Patient has been very lethargic in the morning and often takes several hours to get up and \"get going\".  They have not been giving him the lactulose at home as he did have some diarrhea for a while and is currently having one BM per day.  Patients left sided weakness is better than last time I cared for him but is still significantly worse than his right-side.     Long-discussion with patient and family regarding goals of care.  They want him to be full code with CPR and intubation but do not want him to have a feeding tube if it gets to that point.  They are aware of his poor prognosis and the terminal condition of his disease.     They asked about his BP having a diastolic >100.  I discussed with them avoiding any other medications that may cause weakness, sedation or potential orthostasis or falls.  Would not " "worry about tight BP control given the significant of his cancer\"    Hospital Course:  Patient initially thought to have toxic metabolic encephalopathy due to VPA toxicity, but patient woke up and returned to baseline without treatment.  Encephalopathy likely multifactorial including VPA toxicity, due to his mass and edema from radiation, polypharmacy, as well as untreated sleep apnea.  Given patients need for long-term VPA treatment, we adjusted his dose but also gave PO carnitine as he is likely relatively deficient.  Patient also had his AEDs adjusted per neurology.      Will arrange for ASAP sleep study via his PCP.  He is often more tired in the morning and has a longhistory of sleep apnea.  Would benefit from an insleep lab polysomnography.    Will discharge with home health.    Continue goals of care discussions with family while inpatient.  Would recommend when completing treatment with radiation and chemotherapy if there is no improvement or continued decline they consider placement or hospice.      Attempted to simplify the patients medications.  Given that the patients treatment of hyperlipidemia and hypertension is more focused on prevention of long-term issues as well as concern of adding antihypertensives to patients regimen given tendency to orthostasis, encephalopathy, and falls, I recommended cessation of this maintenance medications for the time being.  Much of his hypertension may be releated to the high-doses of steroids which will need to be tapered following radiation treatment.         Plan for discharge per neurology  · Continue Lyrica 50mg Q12H for 7 days then discontinue  · Continue Depakote 1000mg TID  · Recheck Trough Valproic Acid Level in 5 days  · Recheck CMP in 5 days including liver function tests  · Recheck ammonia level in 5 days        Physical Exam on Discharge:  /85 (BP Location: Right arm, Patient Position: Lying)   Pulse 78   Temp 98.4 °F (36.9 °C) (Oral)   Resp 18   " "Ht 172.7 cm (68\")   Wt 86.9 kg (191 lb 9.6 oz)   SpO2 96%   BMI 29.13 kg/m²   Physical Exam  Constitutional: No distress. Room air.  Mother at bedside.   HENT:   Head: Normocephalic and atraumatic.   Eyes: Conjunctivae are normal. No scleral icterus.   Neck: Neck supple. No JVD present.   Cardiovascular: Normal rate and regular rhythm. Exam reveals no gallop and no friction rub.   No murmur heard.  Pulmonary/Chest: Effort normal and breath sounds normal. No stridor. No respiratory distress. He has no wheezes.   Abdominal: Soft. Bowel sounds are normal. He exhibits no distension. There is no tenderness. There is no guarding.   Musculoskeletal: He exhibits no edema.   Neurological:   Awake and alert; AO x 3; Follows commands.  Left-side 4/5 strength  Skin: Skin is warm and dry. He is not diaphoretic. No erythema.   Psychiatric: He has a normal mood and affect. His behavior is normal. In good spirits  Nursing note and vitals reviewed.           Condition on Discharge: Baseline    Discharge Disposition:  Home or Self Care    Discharge Medications:     Discharge Medications      Changes to Medications      Instructions Start Date   dexamethasone 2 MG tablet  Commonly known as:  DECADRON  What changed:    · medication strength  · how much to take  · when to take this   2 mg, Oral, 2 Times Daily With Meals      divalproex 500 MG DR tablet  Commonly known as:  DEPAKOTE  What changed:  how much to take   1,000 mg, Oral, Every 8 Hours Scheduled      pregabalin 50 MG capsule  Commonly known as:  LYRICA  What changed:    · medication strength  · how much to take  · when to take this  · additional instructions   50 mg, Oral, 2 Times Daily, Then discontinue         Continue These Medications      Instructions Start Date   ALPRAZolam 0.25 MG tablet  Commonly known as:  XANAX   0.25 mg, Oral, 2 Times Daily PRN      aspirin 81 MG tablet   81 mg, Oral, Daily      fish oil 1000 MG capsule capsule   2,000 mg, Oral, Daily With " Breakfast      lacosamide 50 MG tablet tablet  Commonly known as:  VIMPAT   150 mg, Oral, Every 12 Hours Scheduled      lactulose 20 GM/30ML solution solution   20 g, Oral, Daily      levETIRAcetam 500 MG tablet  Commonly known as:  KEPPRA   1,250 mg, Oral, 2 Times Daily      omeprazole 10 MG capsule  Commonly known as:  PRILOSEC   10 mg, Oral, Daily, For acid reflux      oxyCODONE-acetaminophen 7.5-325 MG per tablet  Commonly known as:  PERCOCET   1 tablet, Oral, Every 4 Hours PRN      promethazine 12.5 MG tablet  Commonly known as:  PHENERGAN   12.5 mg, Oral, Every 4 Hours PRN      TEMOZOLOMIDE PO   150 mg, Oral, Daily, 140 mg + 10 mg daily.      venlafaxine  MG 24 hr capsule  Commonly known as:  EFFEXOR-XR   150 mg, Oral, Daily      vitamin B-6 100 MG tablet  Commonly known as:  PYRIDOXINE   100 mg, Oral, Daily         Stop These Medications    allopurinol 300 MG tablet  Commonly known as:  ZYLOPRIM     fenofibrate 145 MG tablet  Commonly known as:  TRICOR     losartan 25 MG tablet  Commonly known as:  COZAAR     QUEtiapine 50 MG tablet  Commonly known as:  SEROquel     sulfamethoxazole-trimethoprim 800-160 MG per tablet  Commonly known as:  BACTRIM DS,SEPTRA DS          Discharge Diet:   Diet Instructions     Diet: Regular; Thin      Discharge Diet:  Regular    Fluid Consistency:  Thin        Patient cleared for a regular diet with thin liquids on this date following a bedside swallow evaluation.  Patient to use strategies of placing food on right side of mouth, liquid wash, and lingual sweep to help clear pocketing.   Daniela Bentley MS CCC-SLP 7/16/2019 9:41 AM                    Activity at Discharge:   Activity Instructions     Activity as Tolerated            Follow-up Appointments:   Future Appointments   Date Time Provider Department Center   7/22/2019  8:50 AM  [facility] LINEAR ACCELERATOR  [facility]   7/23/2019  8:50 AM  [facility] TRUBEAM [facility] LINEAR ACCELERATOR  [facility]   7/23/2019   2:00 PM Malia Vargas MD [facility] None   7/24/2019  8:50 AM [facility] TRU[facility] LINEAR ACCELERATOR [facility]   7/25/2019  8:50 AM [facility] TRUBEAM [facility] LINEAR ACCELERATOR [facility]   8/7/2019  1:30 PM Royal Todd PA [facility] None   8/27/2019 11:00 AM P[facility] MRI 1  [facility]   8/27/2019 12:30 PM Dillon Trevino MD [facility] None       Test Results Pending at Discharge: None    Jairon Webb MD  07/19/19  9:48 AM    Time: 35 minutes.

## 2019-07-19 NOTE — THERAPY DISCHARGE NOTE
Acute Care - Speech Language Pathology   Swallow Treatment Note/Discharge   [facility]     Patient Name: Lukasz Savage  : 1965  MRN: [id number]  Today's Date: 2019  Onset of Illness/Injury or Date of Surgery: 07/15/19     Referring Physician: Dr. Webb      Admit Date: 7/15/2019    Patinet able to recall and is using all compensatory strategies.  No overt s/s of aspiration with breakfast.  No pocketing with use of strategies.  Patient being d/c today.  He is aware to continue to use strategies.  SLP will sign off on patient.    Daniela Bentley MS CCC-SLP 2019 8:34 AM    Visit Dx:      ICD-10-CM ICD-9-CM   1. Altered mental status, unspecified altered mental status type R41.82 780.97   2. Valproic acid toxicity, accidental or unintentional, initial encounter T42.6X1A 966.3     E855.0   3. Impaired functional mobility, balance, gait, and endurance Z74.09 V49.89   4. Dysphagia, unspecified type R13.10 787.20   5. Impaired mobility and ADLs Z74.09 799.89     Patient Active Problem List   Diagnosis   • HTN (hypertension)   • Seizures (CMS/HCC)   • Anemia   • Hypoglycemia   • Left hemiparesis (CMS/HCC)   • Elevated transaminase level   • Inoperable anaplastic glioma of brain    • Hyperammonemia (CMS/HCC)   • Altered mental status   • Metabolic encephalopathy   • Valproic acid toxicity, possible    • Left-sided weakness       Therapy Treatment  Rehabilitation Treatment Summary     Row Name 19 0815             Treatment Time/Intention    Subjective Information  no complaints  -KW      Mode of Treatment  individual therapy;speech-language pathology  -KW      Patient Effort  good  -KW      Recorded by [KW] Daniela Bentley MS CCC-SLP 19 0827      Row Name 19 0815             Pain Scale: FACES Pre/Post-Treatment    Pain: FACES Scale, Pretreatment  0-->no hurt  -KW      Pain: FACES Scale, Post-Treatment  0-->no hurt  -KW      Recorded by [KW] Daniela Bentley MS CCC-SLP 19 0827       Row Name 07/19/19 0815             Outcome Summary/Treatment Plan (SLP)    Daily Summary of Progress (SLP)  progress toward functional goals is good  -KW      Barriers to Overall Progress (SLP)  n/a  -KW      Plan for Continued Treatment (SLP)  discharge  -KW      Anticipated Dischage Disposition  home with assist  -KW      Recorded by [KW] Daniela Bentley, MS CCC-SLP 07/19/19 0827        User Key  (r) = Recorded By, (t) = Taken By, (c) = Cosigned By    Initials Name Effective Dates Discipline    KW Daniela Bentley, MS CCC-SLP 08/02/16 -  SLP        Outcome Summary  Outcome Summary/Treatment Plan (SLP)  Daily Summary of Progress (SLP): progress toward functional goals is good (07/19/19 0815 : Daniela Bentley, MS CCC-SLP)  Barriers to Overall Progress (SLP): n/a (07/19/19 0815 : Daniela Bentley, MS CCC-SLP)  Plan for Continued Treatment (SLP): discharge (07/19/19 0815 : Daniela Bentley, MS CCC-SLP)  Anticipated Dischage Disposition: home with assist (07/19/19 0815 : Daniela Bentley, MS CCC-SLP)  Reason for Discharge: all goals and outcomes met, no further needs identified (07/19/19 0815 : Daniela Bentley, MS CCC-SLP)  SLP GOALS     Row Name 07/19/19 0815 07/18/19 0949 07/17/19 1345       Oral Nutrition/Hydration Goal 1 (SLP)    Oral Nutrition/Hydration Goal 1, SLP  LTG:  Patient will tolerate Regular Diet with thin liquids using compensatory strategies to reduce pcketing on the left side of oral cavity as determined by SLP  -KW  LTG:  Patient will tolerate Regular Diet with thin liquids using compensatory strategies to reduce pcketing on the left side of oral cavity as determined by SLP  -MM  LTG:  Patient will tolerate Regular Diet with thin liquids using compensatory strategies to reduce pcketing on the left side of oral cavity as determined by SLP  -KW    Time Frame (Oral Nutrition/Hydration Goal 1, SLP)  by discharge  -KW  by discharge  -MM  by discharge  -KW    Barriers (Oral Nutrition/Hydration Goal 1,  SLP)  medically complex  -KW  medically complex  -MM  medically complex  -KW    Progress/Outcomes (Oral Nutrition/Hydration Goal 1, SLP)  goal met  -KW  continuing progress toward goal  -MM  continuing progress toward goal  -KW       Swallow Compensatory Strategies Goal 1 (SLP)    Activity (Swallow Compensatory Strategies/Techniques Goal 1, SLP)  compensatory strategies;during meal intake;small cup sips;small straw sips;small bites;food/liquid placed on stronger right side;alternate food/liquid intake;other (see comments)  -KW  compensatory strategies;during meal intake;small cup sips;small straw sips;small bites;food/liquid placed on stronger right side;alternate food/liquid intake;other (see comments)  -MM  compensatory strategies;during meal intake;small cup sips;small straw sips;small bites;food/liquid placed on stronger right side;alternate food/liquid intake;other (see comments)  -KW    [place]/Accuracy (Swallow Compensatory Strategies/Techniques Goal 1, SLP)  independently (over 90% accuracy)  -KW  independently (over 90% accuracy)  -MM  independently (over 90% accuracy)  -KW    Time Frame (Swallow Compensatory Strategies/Techniques Goal 1, SLP)  short term goal (STG);by discharge  -KW  short term goal (STG);by discharge  -MM  short term goal (STG);by discharge  -KW    Barriers (Swallow Compensatory Strategies/Techniques Goal 1, SLP)  medically complex  -KW  medically complex  -MM  medically complex  -KW    Progress/Outcomes (Swallow Compensatory Strategies/Techniques Goal 1, SLP)  goal met  -KW  continuing progress toward goal  -MM  continuing progress toward goal  -KW    Row Name 07/16/19 0850             Oral Nutrition/Hydration Goal 1 (SLP)    Oral Nutrition/Hydration Goal 1, SLP  LTG:  Patient will tolerate Regular Diet with thin liquids using compensatory strategies to reduce pcketing on the left side of oral cavity as determined by SLP  -KW      Time Frame (Oral Nutrition/Hydration Goal 1, SLP)   by discharge  -KW      Barriers (Oral Nutrition/Hydration Goal 1, SLP)  medically complex  -KW      Progress/Outcomes (Oral Nutrition/Hydration Goal 1, SLP)  goal ongoing  -KW         Swallow Compensatory Strategies Goal 1 (SLP)    Activity (Swallow Compensatory Strategies/Techniques Goal 1, SLP)  compensatory strategies;during meal intake;small cup sips;small straw sips;small bites;food/liquid placed on stronger right side;alternate food/liquid intake;other (see comments) lingual clearing  -KW      [place]/Accuracy (Swallow Compensatory Strategies/Techniques Goal 1, SLP)  independently (over 90% accuracy)  -KW      Time Frame (Swallow Compensatory Strategies/Techniques Goal 1, SLP)  short term goal (STG);by discharge  -KW      Barriers (Swallow Compensatory Strategies/Techniques Goal 1, SLP)  medically complex  -KW      Progress/Outcomes (Swallow Compensatory Strategies/Techniques Goal 1, SLP)  goal ongoing  -KW        User Key  (r) = Recorded By, (t) = Taken By, (c) = Cosigned By    Initials Name Provider Type    Daniela Lee MS CCC-SLP Speech and Language Pathologist    Silvina Lopez MS CCC-SLP Speech and Language Pathologist          EDUCATION  The patient has been educated in the following areas:   Dysphagia (Swallowing Impairment).    SLP Recommendation and Plan  Daily Summary of Progress (SLP): progress toward functional goals is good  Barriers to Overall Progress (SLP): n/a  Plan for Continued Treatment (SLP): discharge  Anticipated Dischage Disposition: home with assist        Reason for Discharge: all goals and outcomes met, no further needs identified           Time Calculation:   Time Calculation- SLP     Row Name 07/19/19 0833             Time Calculation- SLP    SLP Start Time  0815  -KW      SLP Stop Time  0830  -KW      SLP Time Calculation (min)  15 min  -KW      SLP Received On  07/19/19  -        User Key  (r) = Recorded By, (t) = Taken By, (c) = Cosigned By    Initials  Name Provider Type    Daniela Lee MS CCC-SLP Speech and Language Pathologist          Therapy Charges for Today     Code Description Service Date Service Provider Modifiers Qty    38663185439 HC ST TREATMENT SWALLOW 1 7/19/2019 Daniela Bentley MS CCC-SLP GN 1               SLP Discharge Summary  Anticipated Dischage Disposition: home with assist  Reason for Discharge: all goals and outcomes met, no further needs identified  Progress Toward Achieving Short/long Term Goals: all goals met within established timelines  Discharge Destination: home w/ assist    Daniela Bentley MS CCC-SLP  7/19/2019

## 2019-07-19 NOTE — PLAN OF CARE
Problem: Fall Risk (Adult)  Goal: Identify Related Risk Factors and Signs and Symptoms  Outcome: Ongoing (interventions implemented as appropriate)    Goal: Absence of Fall  Outcome: Ongoing (interventions implemented as appropriate)      Problem: Confusion, Acute (Adult)  Goal: Identify Related Risk Factors and Signs and Symptoms  Outcome: Ongoing (interventions implemented as appropriate)    Goal: Cognitive/Functional Impairments Minimized  Outcome: Ongoing (interventions implemented as appropriate)    Goal: Safety  Outcome: Ongoing (interventions implemented as appropriate)      Problem: Patient Care Overview  Goal: Plan of Care Review  Outcome: Ongoing (interventions implemented as appropriate)   07/19/19 0233   Coping/Psychosocial   Plan of Care Reviewed With patient   Plan of Care Review   Progress improving   OTHER   Outcome Summary Alert and oriented x 4. Left sided weakness. Denies any numbness. Up x 1 assist, use of walker. Incontinent at times. Use of bipap while asleep. VSS. Takes pills whole in applesauce. Patient turns self in bed. Possible home with home health.     Goal: Individualization and Mutuality  Outcome: Ongoing (interventions implemented as appropriate)      Problem: Skin Injury Risk (Adult)  Goal: Identify Related Risk Factors and Signs and Symptoms  Outcome: Ongoing (interventions implemented as appropriate)    Goal: Skin Health and Integrity  Outcome: Ongoing (interventions implemented as appropriate)

## 2019-07-19 NOTE — THERAPY DISCHARGE NOTE
Acute Care - Occupational Therapy Discharge Summary  [facility]     Patient Name: Lukasz Savage  : 1965  MRN: [id number]    Today's Date: 2019  Onset of Illness/Injury or Date of Surgery: 07/15/19    Date of Referral to OT: 07/15/19  Referring Physician: Dr. Webb      Admit Date: 7/15/2019        OT Recommendation and Plan    Visit Dx:    ICD-10-CM ICD-9-CM   1. Altered mental status, unspecified altered mental status type R41.82 780.97   2. Valproic acid toxicity, accidental or unintentional, initial encounter T42.6X1A 966.3     E855.0   3. Impaired functional mobility, balance, gait, and endurance Z74.09 V49.89   4. Dysphagia, unspecified type R13.10 787.20   5. Impaired mobility and ADLs Z74.09 799.89   6. Elevated transaminase level R74.0 790.4   7. Metabolic encephalopathy G93.41 348.31   8. Left-sided weakness R53.1 728.87               Rehab Goal Summary     Row Name 19 1400 19 1310 19 0815       Physical Therapy Goals    Bed Mobility Goal Selection (PT)  --  bed mobility, PT goal 1  -AB  --    Transfer Goal Selection (PT)  --  transfer, PT goal 1  -AB  --    Gait Training Goal Selection (PT)  --  gait training, PT goal 1  -AB  --    Balance Goal Selection (PT)  --  balance, PT goal 1  -AB  --    Stairs Goal Selection (PT)  --  stairs, PT goal 1  -AB  --       Bed Mobility Goal 1 (PT)    Activity/Assistive Device (Bed Mobility Goal 1, PT)  --  rolling to left;rolling to right;sit to supine;supine to sit  -AB  --    [place] Level/Cues Needed (Bed Mobility Goal 1, PT)  --  conditional independence  -AB  --    Time Frame (Bed Mobility Goal 1, PT)  --  long term goal (LTG);by discharge  -AB  --    Progress/Outcomes (Bed Mobility Goal 1, PT)  --  goal not met  -AB  --       Transfer Goal 1 (PT)    Activity/Assistive Device (Transfer Goal 1, PT)  --  sit-to-stand/stand-to-sit;bed-to-chair/chair-to-bed  -AB  --    [place] Level/Cues Needed (Transfer Goal 1, PT)  --   supervision required  -AB  --    Time Frame (Transfer Goal 1, PT)  --  long term goal (LTG);by discharge  -AB  --    Progress/Outcome (Transfer Goal 1, PT)  --  goal not met  -AB  --       Gait Training Goal 1 (PT)    Activity/Assistive Device (Gait Training Goal 1, PT)  --  gait (walking locomotion);assistive device use;backward stepping;decrease fall risk;forward stepping;diminish gait deviation;increase endurance/gait distance;improve balance and speed;sidestepping;turning, left;turning, right;walker, rolling with and without assist device pending progress  -AB  --    [place] Level (Gait Training Goal 1, PT)  --  contact guard assist  -AB  --    Distance (Gait Goal 1, PT)  --  Pt will ambulate 100ft on even surfaces with no bouts of LOB  -AB  --    Time Frame (Gait Training Goal 1, PT)  --  long term goal (LTG);by discharge  -AB  --    Progress/Outcome (Gait Training Goal 1, PT)  --  goal met  -AB  --       Balance Goal 1 (PT)    Activity/Assistive Device (Balance Goal 1, PT)  --  sitting, dynamic;standing, static;standing, dynamic  -AB  --    [place] Level/Cues Needed (Balance Goal 1, PT)  --  supervision required  -AB  --    Time Frame (Balance Goal 1, PT)  --  long term goal (LTG);by discharge  -AB  --    Progress/Outcomes (Balance Goal 1, PT)  --  goal not met  -AB  --       Stairs Goal 1 (PT)    Activity/Assistive Device (Stairs Goal 1, PT)  --  stairs, all skills;ascending stairs;descending stairs;using handrail, right;step-to-step;decrease fall risk;improve balance and speed  -AB  --    [place] Level/Cues Needed (Stairs Goal 1, PT)  --  contact guard assist  -AB  --    Number of Stairs (Stairs Goal 1, PT)  --  2  -AB  --    Time Frame (Stairs Goal 1, PT)  --  long term goal (LTG);by discharge  -AB  --    Progress/Outcome (Stairs Goal 1, PT)  --  goal not met  -AB  --       Transfer Goal 1 (OT)    Activity/Assistive Device (Transfer Goal 1, OT)   sit-to-stand/stand-to-sit;bed-to-chair/chair-to-bed;toilet;shower chair;walk-in shower  -TS  --  --    [place] Level/Cues Needed (Transfer Goal 1, OT)  verbal cues required;supervision required  -TS  --  --    Time Frame (Transfer Goal 1, OT)  10 days  -TS  --  --    Progress/Outcome (Transfer Goal 1, OT)  goal not met  -TS  --  --       Bathing Goal 1 (OT)    Activity/Assistive Device (Bathing Goal 1, OT)  bathing skills, all  -TS  --  --    [place] Level/Cues Needed (Bathing Goal 1, OT)  minimum assist (75% or more patient effort);verbal cues required  -TS  --  --    Time Frame (Bathing Goal 1, OT)  10 days  -TS  --  --    Progress/Outcomes (Bathing Goal 1, OT)  goal not met  -TS  --  --       Dressing Goal 1 (OT)    Activity/Assistive Device (Dressing Goal 1, OT)  dressing skills, all  -TS  --  --    [place]/Cues Needed (Dressing Goal 1, OT)  standby assist;verbal cues required  -TS  --  --    Time Frame (Dressing Goal 1, OT)  10 days  -TS  --  --    Progress/Outcome (Dressing Goal 1, OT)  goal not met  -TS  --  --       Toileting Goal 1 (OT)    Activity/Device (Toileting Goal 1, OT)  toileting skills, all;commode  -TS  --  --    [place] Level/Cues Needed (Toileting Goal 1, OT)  supervision required;verbal cues required  -TS  --  --    Time Frame (Toileting Goal 1, OT)  10 days  -TS  --  --    Progress/Outcome (Toileting Goal 1, OT)  goal not met  -TS  --  --       Oral Nutrition/Hydration Goal 1 (SLP)    Oral Nutrition/Hydration Goal 1, SLP  --  --  LTG:  Patient will tolerate Regular Diet with thin liquids using compensatory strategies to reduce pcketing on the left side of oral cavity as determined by SLP  -KW    Time Frame (Oral Nutrition/Hydration Goal 1, SLP)  --  --  by discharge  -KW    Barriers (Oral Nutrition/Hydration Goal 1, SLP)  --  --  medically complex  -KW    Progress/Outcomes (Oral Nutrition/Hydration Goal 1, SLP)  --  --  goal met  -KW       Swallow Compensatory Strategies  Goal 1 (SLP)    Activity (Swallow Compensatory Strategies/Techniques Goal 1, SLP)  --  --  compensatory strategies;during meal intake;small cup sips;small straw sips;small bites;food/liquid placed on stronger right side;alternate food/liquid intake;other (see comments)  -KW    [place]/Accuracy (Swallow Compensatory Strategies/Techniques Goal 1, SLP)  --  --  independently (over 90% accuracy)  -KW    Time Frame (Swallow Compensatory Strategies/Techniques Goal 1, SLP)  --  --  short term goal (STG);by discharge  -KW    Barriers (Swallow Compensatory Strategies/Techniques Goal 1, SLP)  --  --  medically complex  -KW    Progress/Outcomes (Swallow Compensatory Strategies/Techniques Goal 1, SLP)  --  --  goal met  -KW      User Key  (r) = Recorded By, (t) = Taken By, (c) = Cosigned By    Initials Name Provider Type Discipline    AB Malia Roy, PTA Physical Therapy Assistant PT    TS Enma Ellis, AGUILAR/L Occupational Therapy Assistant OT    Daniela Lee MS CCC-SLP Speech and Language Pathologist SLP          Outcome Measures     Row Name 07/18/19 1100 07/17/19 1100 07/17/19 1000       How much help from another person do you currently need...    Turning from your back to your side while in flat bed without using bedrails?  4  -NW  --  4  -MS (r) AL (t) MS (c)    Moving from lying on back to sitting on the side of a flat bed without bedrails?  3  -NW  --  3  -MS (r) AL (t) MS (c)    Moving to and from a bed to a chair (including a wheelchair)?  3  -NW  --  3  -MS (r) AL (t) MS (c)    Standing up from a chair using your arms (e.g., wheelchair, bedside chair)?  3  -NW  --  3  -MS (r) AL (t) MS (c)    Climbing 3-5 steps with a railing?  3  -NW  --  3  -MS (r) AL (t) MS (c)    To walk in hospital room?  3  -NW  --  3  -MS (r) AL (t) MS (c)    AM-PAC 6 Clicks Score (PT)  19  -NW  --  19  -MS (r) AL (t)       How much help from another is currently needed...    Putting on and taking off regular lower  body clothing?  3  -TS  3  -TS  --    Bathing (including washing, rinsing, and drying)  3  -TS  3  -TS  --    Toileting (which includes using toilet bed pan or urinal)  3  -TS  3  -TS  --    Putting on and taking off regular upper body clothing  3  -TS  3  -TS  --    Taking care of personal grooming (such as brushing teeth)  3  -TS  3  -TS  --    Eating meals  4  -TS  4  -TS  --    AM-PAC 6 Clicks Score (OT)  19  -TS  19  -TS  --       Functional Assessment    Outcome Measure Options  AM-PAC 6 Clicks Daily Activity (OT)  -TS  AM-PAC 6 Clicks Daily Activity (OT)  -TS  AM-PAC 6 Clicks Basic Mobility (PT)  -MS (r) AL (t) MS (c)      User Key  (r) = Recorded By, (t) = Taken By, (c) = Cosigned By    Initials Name Provider Type    Enma Richards COTA/L Occupational Therapy Assistant    Silvina Jarrell, PT, DPT, NCS Physical Therapist    NW Dulce Maria Mendez, PTA Physical Therapy Assistant    AL Alan, Aj, PT Student PT Student          Therapy Suggested Charges     Code   Minutes Charges    14725 (CPT®) Hc Ot Neuromusc Re Education Ea 15 Min      89598 (CPT®) Hc Ot Ther Proc Ea 15 Min      66704 (CPT®) Hc Ot Therapeutic Act Ea 15 Min      30387 (CPT®) Hc Ot Manual Therapy Ea 15 Min      51195 (CPT®) Hc Ot Iontophoresis Ea 15 Min      70259 (CPT®) Hc Ot Elec Stim Ea-Per 15 Min      20839 (CPT®) Hc Ot Ultrasound Ea 15 Min      29577 (CPT®) Hc Ot Self Care/Mgmt/Train Ea 15 Min 30 2    Total  30 2          Therapy Charges for Today     Code Description Service Date Service Provider Modifiers Qty    [id number] HC OT SELF CARE/MGMT/TRAIN EA 15 MIN 7/18/2019 Enma Ellis COTA/L GO 2          OT Discharge Summary  Reason for Discharge: Discharge from facility  Outcomes Achieved: Refer to plan of care for updates on goals achieved  Discharge Destination: Home with assist      CHRISTAL Burrell  7/19/2019

## 2019-07-19 NOTE — TELEPHONE ENCOUNTER
Drew Dee with [facility] called asking for pt to be seen on 08/01/2019 and be evaluated for a sleep study. Dr. Webb made this recommendation per Drew Dee.

## 2019-07-20 ENCOUNTER — READMISSION MANAGEMENT (OUTPATIENT)
Dept: CALL CENTER | Facility: HOSPITAL | Age: 54
End: 2019-07-20

## 2019-07-20 NOTE — OUTREACH NOTE
Prep Survey      Responses   Facility patient discharged from?  [place]   Is patient eligible?  Yes   Discharge diagnosis  Metabolic encephalopathy   Does the patient have one of the following disease processes/diagnoses(primary or secondary)?  Other   Does the patient have Home health ordered?  Yes   What is the Home health agency?   [facility]    Is there a DME ordered?  No   Prep survey completed?  Yes          Sari Heard RN

## 2019-07-22 ENCOUNTER — READMISSION MANAGEMENT (OUTPATIENT)
Dept: CALL CENTER | Facility: HOSPITAL | Age: 54
End: 2019-07-22

## 2019-07-22 ENCOUNTER — HOSPITAL ENCOUNTER (OUTPATIENT)
Dept: RADIATION ONCOLOGY | Facility: HOSPITAL | Age: 54
Discharge: HOME | End: 2019-07-22

## 2019-07-22 ENCOUNTER — TRANSITIONAL CARE MANAGEMENT TELEPHONE ENCOUNTER (OUTPATIENT)
Dept: FAMILY MEDICINE CLINIC | Facility: CLINIC | Age: 54
End: 2019-07-22

## 2019-07-22 DIAGNOSIS — K21.9 GASTROESOPHAGEAL REFLUX DISEASE WITHOUT ESOPHAGITIS: ICD-10-CM

## 2019-07-22 PROCEDURE — 77412 RADIATION TX DELIVERY LVL 3: CPT | Performed by: RADIOLOGY

## 2019-07-22 RX ORDER — OMEPRAZOLE 10 MG/1
CAPSULE, DELAYED RELEASE ORAL
Qty: 90 CAPSULE | Refills: 1 | Status: CANCELLED | OUTPATIENT
Start: 2019-07-22

## 2019-07-22 RX ORDER — PROMETHAZINE HYDROCHLORIDE 12.5 MG/1
12.5 TABLET ORAL EVERY 4 HOURS PRN
Qty: 60 TABLET | Refills: 1 | Status: SHIPPED | OUTPATIENT
Start: 2019-07-22 | End: 2019-08-07 | Stop reason: SDUPTHER

## 2019-07-22 RX ORDER — OMEPRAZOLE 10 MG/1
CAPSULE, DELAYED RELEASE ORAL
Qty: 90 CAPSULE | Refills: 1 | Status: SHIPPED | OUTPATIENT
Start: 2019-07-22 | End: 2019-12-10 | Stop reason: SDUPTHER

## 2019-07-22 NOTE — OUTREACH NOTE
Medical Week 1 Survey      Responses   Facility patient discharged from?  [place]   Does the patient have one of the following disease processes/diagnoses(primary or secondary)?  Other   Is there a successful TCM telephone encounter documented?  No   Week 1 attempt successful?  No   Unsuccessful attempts  Attempt 2          Lyn Saleh RN

## 2019-07-22 NOTE — OUTREACH NOTE
Pt is feeling nauseous- will need refill on phenergan and is out of Prilosec. Requested refill for prilosec today to [facility] pharmacy if possible.  Pt's mother states that home health has been conducting their evaluations for pt over the last few days.

## 2019-07-23 ENCOUNTER — READMISSION MANAGEMENT (OUTPATIENT)
Dept: CALL CENTER | Facility: HOSPITAL | Age: 54
End: 2019-07-23

## 2019-07-23 ENCOUNTER — OFFICE VISIT (OUTPATIENT)
Dept: FAMILY MEDICINE CLINIC | Facility: CLINIC | Age: 54
End: 2019-07-23

## 2019-07-23 VITALS
WEIGHT: 181.6 LBS | TEMPERATURE: 98 F | DIASTOLIC BLOOD PRESSURE: 72 MMHG | HEART RATE: 88 BPM | BODY MASS INDEX: 27.61 KG/M2 | OXYGEN SATURATION: 98 % | RESPIRATION RATE: 20 BRPM | SYSTOLIC BLOOD PRESSURE: 118 MMHG

## 2019-07-23 DIAGNOSIS — G93.41 METABOLIC ENCEPHALOPATHY: Primary | ICD-10-CM

## 2019-07-23 DIAGNOSIS — I10 ESSENTIAL HYPERTENSION: ICD-10-CM

## 2019-07-23 DIAGNOSIS — R53.1 LEFT-SIDED WEAKNESS: ICD-10-CM

## 2019-07-23 DIAGNOSIS — C71.9 ANAPLASTIC GLIOMA OF BRAIN (HCC): Chronic | ICD-10-CM

## 2019-07-23 PROCEDURE — 99214 OFFICE O/P EST MOD 30 MIN: CPT | Performed by: FAMILY MEDICINE

## 2019-07-23 RX ORDER — LACOSAMIDE 50 MG/1
150 TABLET ORAL EVERY 12 HOURS SCHEDULED
COMMUNITY
End: 2019-08-07 | Stop reason: DRUGHIGH

## 2019-07-23 RX ORDER — SULFAMETHOXAZOLE AND TRIMETHOPRIM 800; 160 MG/1; MG/1
1 TABLET ORAL
COMMUNITY
End: 2019-08-07

## 2019-07-23 NOTE — OUTREACH NOTE
Medical Week 1 Survey      Responses   Facility patient discharged from?  [place]   Does the patient have one of the following disease processes/diagnoses(primary or secondary)?  Other   Is there a successful TCM telephone encounter documented?  No   Week 1 attempt successful?  No   Unsuccessful attempts  Attempt 3          Pema Bose RN

## 2019-07-24 ENCOUNTER — OFFICE VISIT (OUTPATIENT)
Dept: NEUROLOGY | Facility: CLINIC | Age: 54
End: 2019-07-24

## 2019-07-24 ENCOUNTER — READMISSION MANAGEMENT (OUTPATIENT)
Dept: CALL CENTER | Facility: HOSPITAL | Age: 54
End: 2019-07-24

## 2019-07-24 ENCOUNTER — HOSPITAL ENCOUNTER (OUTPATIENT)
Dept: RADIATION ONCOLOGY | Facility: HOSPITAL | Age: 54
Discharge: HOME | End: 2019-07-24

## 2019-07-24 VITALS
HEART RATE: 80 BPM | HEIGHT: 68 IN | RESPIRATION RATE: 18 BRPM | DIASTOLIC BLOOD PRESSURE: 90 MMHG | SYSTOLIC BLOOD PRESSURE: 128 MMHG | WEIGHT: 183 LBS | BODY MASS INDEX: 27.74 KG/M2

## 2019-07-24 DIAGNOSIS — G47.10 HYPERSOMNOLENCE: ICD-10-CM

## 2019-07-24 DIAGNOSIS — C71.9 ANAPLASTIC GLIOMA OF BRAIN (HCC): Primary | Chronic | ICD-10-CM

## 2019-07-24 DIAGNOSIS — G47.33 OBSTRUCTIVE SLEEP APNEA: ICD-10-CM

## 2019-07-24 DIAGNOSIS — G81.94 LEFT HEMIPARESIS (HCC): ICD-10-CM

## 2019-07-24 PROCEDURE — 77412 RADIATION TX DELIVERY LVL 3: CPT | Performed by: RADIOLOGY

## 2019-07-24 PROCEDURE — 99214 OFFICE O/P EST MOD 30 MIN: CPT | Performed by: NURSE PRACTITIONER

## 2019-07-24 NOTE — PROGRESS NOTES
Subjective     Chief Complaint   Patient presents with   • Sleeping Problem       Lukasz Savage is a 54 y.o. male presents today for evaluation of sleep apnea.  History of Present Illness  Mr. Savage is a pleasant 54-year-old male who presents today with his daughter for evaluation of sleep apnea.  Prior to being off work he was an  and worked at plants at [employer].  He was diagnosed approximately 15 years ago with sleep apnea and his original sleep study was in [place].  I do not have that available for review.  He did wear his CPAP machine for approximately 8 months to year before giving up on it.  He has not been treated for sleep apnea since then.  Patient has most recently been diagnosed with inoperable anaplastic glioma of the brain.  He is undergoing chemo and radiation at this time.  He has had recurrent surgeries secondary to diagnosis and is on multiple AEDs with persistent left-sided weakness.  He is monitored by neurology, YEVGENIY Blair.  He was most recently discharged from the hospital after becoming confused, lethargic, with worsening of speech.  AED medications were adjusted.  It was advised importance in need of having a sleep study for adequate treatment of sleep apnea.He continues to complain of shakiness in the left side but no tonic clonic seizure activity. He has occasional slurring of the speech and continued weakness in upper extremity. He does have significant somnolence.  His sleep questionnaire is reviewed in office today.  He does have excessive daytime fatigue, difficulty with fatigue during the day, frequently falling asleep while watching television and falling asleep at times a day when he is quiet.  He also complains of loud snoring, snoring in all sleep positions, awakening with a dry mouth, waking up gasping for air as well as being told he quit breathing at night.  He also complained of morning headaches.  He has history of waking up with a sour taste or  burning sensation in his chest.  He is currently treated for acid reflux.  He does have sudden onset of sleep Amanda throughout the day but denies any cataplectic or sleep paralysis events.  He does have difficulty with discomfort in his legs at nighttime.  He did have a history of sleepwalking but relates his last sleepwalking event was approximately 4 months ago.  He is currently on multiple AED medications for seizure activity.  He has had a 20 pound weight loss since his diagnosis of his brain tumor.  He does go to bed at 8:30 PM and awakens at 6:30 AM.  He sleeps approximately 8 to 10 hours a night and reports continued somnolence.  Some of this could be secondary to medication effects.   He is on AED medications for seizures following diagnoses of brain tumor. He denies use of tobacco alcohol or illicit drugs. He is a former smoker . Prior to stopping he was a 20 year 1/2 ppd smoker. He also was a heavy drinker until 2017. He was then hospitalized for drinking/ PRES and was AED at that time which was dc'd in 9/2017. He stopped drinking after that.  He was evaluated his primary care provider's office yesterday, Dr. Malia Vargas.  His CPAP machine that he was prescribed 15 years ago is not in working order.  His neck circumference is 17 inches. He is post craniotomy and follows with Dr. Trevino in Neurosurgery. He continues to have occasional mental status issues.         Current Outpatient Medications   Medication Sig Dispense Refill   • ALPRAZolam (XANAX) 0.25 MG tablet Take 1 tablet by mouth 2 (Two) Times a Day As Needed for Anxiety. 60 tablet 0   • aspirin 81 MG tablet Take 1 tablet by mouth Daily.     • dexamethasone (DECADRON) 2 MG tablet Take 1 tablet by mouth 2 (Two) Times a Day With Meals for 7 days. 14 tablet 0   • divalproex (DEPAKOTE) 500 MG DR tablet Take 2 tablets by mouth Every 8 (Eight) Hours. 90 tablet 0   • lacosamide (VIMPAT) 50 MG tablet tablet Take 50 mg by mouth Every 12 (Twelve) Hours.  Take 3tab q12hr     • lactulose 20 GM/30ML solution solution Take 30 mL by mouth Daily. 946 mL 1   • levETIRAcetam (KEPPRA) 500 MG tablet Take 2.5 tablets by mouth 2 (Two) Times a Day. 30 tablet 0   • Omega-3 Fatty Acids (FISH OIL) 1000 MG capsule capsule Take 2 capsules by mouth Daily With Breakfast. 180 capsule 1   • omeprazole (prilOSEC) 10 MG capsule TAKE ONE CAPSULE BY MOUTH DAILY FOR ACID REFLUX 90 capsule 1   • oxyCODONE-acetaminophen (PERCOCET) 7.5-325 MG per tablet Take 1 tablet by mouth Every 4 (Four) Hours As Needed for Moderate Pain  or Severe Pain . 40 tablet 0   • pregabalin (LYRICA) 50 MG capsule Take 1 capsule by mouth 2 (Two) Times a Day for 7 days. Then discontinue 14 capsule 0   • promethazine (PHENERGAN) 12.5 MG tablet Take 1 tablet by mouth Every 4 (Four) Hours As Needed for Nausea or Vomiting. 60 tablet 1   • sulfamethoxazole-trimethoprim (BACTRIM DS,SEPTRA DS) 800-160 MG per tablet Take 1 tablet by mouth. Take with chemo     • TEMOZOLOMIDE PO Take 150 mg by mouth Daily. 140 mg + 10 mg daily.     • venlafaxine XR (EFFEXOR-XR) 150 MG 24 hr capsule Take 1 capsule by mouth Daily. 90 capsule 1   • vitamin B-6 (PYRIDOXINE) 100 MG tablet TAKE 1 TABLET BY MOUTH DAILY. 30 tablet 5     No current facility-administered medications for this visit.        Past Medical History:   Diagnosis Date   • Anemia 6/17/2019   • Angio-edema 7/3/2017   • Anxiety    • Arthritis    • Cancer (CMS/HCC) 05/09/2019    Brain cancer diagnoised yesterday  Dr Trevino    • Clostridium difficile colitis    • Erectile dysfunction 10/6/2016   • GERD (gastroesophageal reflux disease)    • Gout    • HCAP (healthcare-associated pneumonia) 7/11/2017   • Hyperlipidemia    • Malignant hypertension    • MSSA (methicillin susceptible Staphylococcus aureus) infection 7/31/2017   • Obstructive sleep apnea    • S/P shoulder surgery 7/27/2018   • Seizures (CMS/HCC) 7/4/2017       Past Surgical History:   Procedure Laterality Date   •  "COLONOSCOPY     • CRANIOTOMY Right 4/15/2019    Procedure: CRANIOTOMY WITH BIOPSY RIGHT;  Surgeon: Dillon Trevino MD;  Location:  [facility] OR;  Service: Neurosurgery   • SHOULDER ARTHROSCOPY Left    • TRACHEOSTOMY N/A 2017    Procedure: TRACHEOSTOMY WITH TRACHEOSCOPY;  Surgeon: Jairon Pierson MD;  Location:  [facility] OR;  Service:        family history includes Diabetes in his maternal grandmother and mother; Heart attack in his paternal grandfather; Heart disease in his father; Hypertension in his father and mother; Kidney disease in his sister; No Known Problems in his daughter, maternal grandfather, paternal grandmother, and son.    Social History     Tobacco Use   • Smoking status: Former Smoker     Packs/day: 0.33     Years: 30.00     Pack years: 9.90     Types: Cigarettes     Last attempt to quit: 7/3/2017     Years since quittin.0   • Smokeless tobacco: Never Used   Substance Use Topics   • Alcohol use: No     Frequency: Never     Comment: used to drink alot but now just ocasional beer since has seizure in 2017   • Drug use: No       Review of Systems   Constitutional: Positive for activity change, fatigue and unexpected weight loss.   HENT: Positive for postnasal drip, tinnitus, trouble swallowing and voice change.    Eyes: Negative.    Respiratory: Negative.    Cardiovascular: Negative.    Gastrointestinal: Positive for nausea, GERD and indigestion.   Endocrine: Negative.    Genitourinary: Negative.    Neurological: Positive for dizziness, seizures, speech difficulty, weakness, light-headedness, numbness, headache, memory problem and confusion.   Psychiatric/Behavioral: Positive for sleep disturbance and depressed mood. The patient is nervous/anxious.        Objective     /90 (BP Location: Left arm, Patient Position: Sitting)   Pulse 80   Resp 18   Ht 172.7 cm (68\")   Wt 83 kg (183 lb)   BMI 27.83 kg/m² , Body mass index is 27.83 kg/m².    Physical Exam   Constitutional: He is oriented to " person, place, and time. He appears well-developed and well-nourished.   HENT:   Head: Normocephalic.   Mallampati class III anatomy.   Eyes: EOM are normal. Pupils are equal, round, and reactive to light.   Neck: Normal range of motion. Neck supple.   Cardiovascular: Normal rate and regular rhythm.   Pulmonary/Chest: Effort normal and breath sounds normal.   Abdominal: Soft.   Musculoskeletal: Normal range of motion.   Weakness noted to left upper extremity with some ataxia which is worse distal to proximal. Ambulates with single point cane    Neurological: He is alert and oriented to person, place, and time.   Skin: Skin is warm and dry.   Psychiatric: He has a normal mood and affect.         ASSESSMENT/PLAN    Lukasz was seen today for sleeping problem.    Diagnoses and all orders for this visit:    Inoperable anaplastic glioma of brain   -     Polysomnography 4 or More Parameters; Future    Obstructive sleep apnea  -     Polysomnography 4 or More Parameters; Future    Hypersomnolence  -     Polysomnography 4 or More Parameters; Future    Left hemiparesis (CMS/HCC)  -     Polysomnography 4 or More Parameters; Future          Allergies and all known medications/prescriptions have been reviewed using resources available on this encounter.    Patient's Body mass index is 27.83 kg/m². BMI is above normal parameters. Recommendations include: none (medical contraindication).    Return in about 4 weeks (around 2019).    MEDICAL DECISION MAKIN.  Reviewed sleep questionnaire with patient.  He has a past medical history of diagnosis of sleep apnea and did wear CPAP therapy approximately 15 years ago for 8 months to a year.  Given recent diagnosis of brain tumor as well as persistent seizures following diagnosis it is imperative for him to be evaluated as soon as possible for sleep apnea as hypoxia and sleep deprivation from untreated sleep apnea will lower seizure threshold. Given patients complex diagnosis  with memory issues and left hemiparesis/seizure activity requiring multple AED's patient is not a candidate for HST and recommend in lab split night testing. He is agreeable. We will try to expedite approval with his insurance and get his scheduled for testing in lab next week. Patient is agreeable.   2. Counseled on multimodal approach to treatment of sleep apnea should he be diagnosed to include but not limited to diet, exercise, sleep hygiene, compliance with pap therapy. Encouraged lateral sleeping position and to avoid sedatives or alcohol close to bedtime. Risks of untreated sleep apnea were discussed to include but not limited to HTN, heart disease, stroke, cardiac arrhythmia such as AFIB, and dementia.   3.  Keep follow-up as scheduled with YEVGENIY Blair with neurology.  Remains on multiple AEDs at this time.  Denies any tonic-clonic seizure activity.  4.  Keep follow-up appoint with neurosurgery as scheduled.  5.  Keep all follow-up appointments with oncology patient is undergoing chemo and radiation at this time.      EPI Yoon

## 2019-07-24 NOTE — OUTREACH NOTE
Medical Week 1 Survey      Responses   Facility patient discharged from?  [facility]   Does the patient have one of the following disease processes/diagnoses(primary or secondary)?  Other   Is there a successful TCM telephone encounter documented?  No   Week 1 attempt successful?  No   Unsuccessful attempts  Attempt 4          Adam Seals RN

## 2019-07-25 ENCOUNTER — TELEPHONE (OUTPATIENT)
Dept: FAMILY MEDICINE CLINIC | Facility: CLINIC | Age: 54
End: 2019-07-25

## 2019-07-25 ENCOUNTER — HOSPITAL ENCOUNTER (OUTPATIENT)
Dept: RADIATION ONCOLOGY | Facility: HOSPITAL | Age: 54
Discharge: HOME | End: 2019-07-25

## 2019-07-25 PROCEDURE — 77417 THER RADIOLOGY PORT IMAGE(S): CPT | Performed by: RADIOLOGY

## 2019-07-25 PROCEDURE — 77412 RADIATION TX DELIVERY LVL 3: CPT | Performed by: RADIOLOGY

## 2019-07-25 NOTE — TELEPHONE ENCOUNTER
Laura with [facility] called wanting a order for personal care two times weekly for four weeks.     Verbal order was given.

## 2019-07-26 ENCOUNTER — HOSPITAL ENCOUNTER (OUTPATIENT)
Dept: RADIATION ONCOLOGY | Facility: HOSPITAL | Age: 54
Discharge: HOME | End: 2019-07-26

## 2019-07-26 PROCEDURE — 77412 RADIATION TX DELIVERY LVL 3: CPT | Performed by: RADIOLOGY

## 2019-07-29 ENCOUNTER — HOSPITAL ENCOUNTER (OUTPATIENT)
Dept: RADIATION ONCOLOGY | Facility: HOSPITAL | Age: 54
Discharge: HOME | End: 2019-07-29

## 2019-07-29 PROCEDURE — 77412 RADIATION TX DELIVERY LVL 3: CPT | Performed by: RADIOLOGY

## 2019-07-30 ENCOUNTER — HOSPITAL ENCOUNTER (OUTPATIENT)
Dept: RADIATION ONCOLOGY | Facility: HOSPITAL | Age: 54
Discharge: HOME | End: 2019-07-30

## 2019-07-30 PROCEDURE — 77412 RADIATION TX DELIVERY LVL 3: CPT | Performed by: RADIOLOGY

## 2019-07-30 PROCEDURE — 77336 RADIATION PHYSICS CONSULT: CPT | Performed by: RADIOLOGY

## 2019-07-31 ENCOUNTER — READMISSION MANAGEMENT (OUTPATIENT)
Dept: CALL CENTER | Facility: HOSPITAL | Age: 54
End: 2019-07-31

## 2019-07-31 ENCOUNTER — RESULTS ENCOUNTER (OUTPATIENT)
Dept: FAMILY MEDICINE CLINIC | Facility: CLINIC | Age: 54
End: 2019-07-31

## 2019-07-31 DIAGNOSIS — R89.9 ABNORMAL LABORATORY TEST: ICD-10-CM

## 2019-07-31 DIAGNOSIS — R89.9 ABNORMAL LABORATORY TEST: Primary | ICD-10-CM

## 2019-07-31 NOTE — OUTREACH NOTE
Medical Week 2 Survey      Responses   Facility patient discharged from?  [facility]   Does the patient have one of the following disease processes/diagnoses(primary or secondary)?  Other   Week 2 attempt successful?  No   Unsuccessful attempts  Attempt 1          Diya Forman RN

## 2019-08-01 ENCOUNTER — READMISSION MANAGEMENT (OUTPATIENT)
Dept: CALL CENTER | Facility: HOSPITAL | Age: 54
End: 2019-08-01

## 2019-08-01 ENCOUNTER — TELEPHONE (OUTPATIENT)
Dept: FAMILY MEDICINE CLINIC | Facility: CLINIC | Age: 54
End: 2019-08-01

## 2019-08-02 ENCOUNTER — HOSPITAL ENCOUNTER (OUTPATIENT)
Dept: INFUSION THERAPY | Age: 54
Discharge: HOME | End: 2019-08-02
Payer: MEDICAID

## 2019-08-06 NOTE — PROGRESS NOTES
Transitional Care Follow Up Visit  Subjective   Cc: Hospital follow-up for encephalopathy  Lukasz Matta Hussein is a 54 y.o. male who presents for a transitional care management visit.    Within 48 business hours after discharge our office contacted him via telephone to coordinate his care and needs.      I reviewed and discussed the details of that call along with the discharge summary, hospital problems, inpatient lab results, inpatient diagnostic studies, and consultation reports with Lukasz.     Current outpatient and discharge medications have been reconciled for the patient.    Date of TCM Phone Call 7/20/2017 6/10/2019 6/10/2019 6/26/2019 6/26/2019 7/22/2019   [facility]    Date of Admission 7/3/2017 6/3/2019 6/3/2019 6/14/2019 6/14/2019 7/15/2019   Date of Discharge 7/19/2017 6/6/2019 6/6/2019 6/20/2019 6/20/2019 7/19/2019   Discharge Disposition Home or Self Care - Home or Self Care - - Home-Health Care Svc     Risk for Readmission (LACE) No Data Recorded    History of Present Illness   Course During Hospital Stay: Copied from discharge note:    Hospital Course:  Patient initially thought to have toxic metabolic encephalopathy due to VPA toxicity, but patient woke up and returned to baseline without treatment.  Encephalopathy likely multifactorial including VPA toxicity, due to his mass and edema from radiation, polypharmacy, as well as untreated sleep apnea.  Given patients need for long-term VPA treatment, we adjusted his dose but also gave PO carnitine as he is likely relatively deficient.  Patient also had his AEDs adjusted per neurology.       Will arrange for ASAP sleep study via his PCP.  He is often more tired in the morning and has a longhistory of sleep apnea.  Would benefit from an insleep lab polysomnography.     Will discharge with home health.     Continue goals of care discussions with family while  inpatient.  Would recommend when completing treatment with radiation and chemotherapy if there is no improvement or continued decline they consider placement or hospice.       Attempted to simplify the patients medications.  Given that the patients treatment of hyperlipidemia and hypertension is more focused on prevention of long-term issues as well as concern of adding antihypertensives to patients regimen given tendency to orthostasis, encephalopathy, and falls, I recommended cessation of this maintenance medications for the time being.  Much of his hypertension may be releated to the high-doses of steroids which will need to be tapered following radiation treatment.     Plan for discharge per neurology  · Continue Lyrica 50mg Q12H for 7 days then discontinue  · Continue Depakote 1000mg TID  · Recheck Trough Valproic Acid Level in 5 days  · Recheck CMP in 5 days including liver function tests  · Recheck ammonia level in 5 days    ====================================    Patient has been doing extremely well since returning home.  Patient walked into the office on his own today.  He is very alert.  He is answering questions appropriately and able to carry on direct conversation regarding his help and his current needs.  Patient is accompanied by his mother.  Patient is currently receiving home health services.  They would like to get a sleep study set up.  Patient had previously been referred to sleep medicine.  We will check on the status of that appointment.  Patient already has appointment with neurology scheduled.  All questions answered.  Long discussion with patient and his mother regarding advanced directive and his wishes when he is unable to speak for himself.     The following portions of the patient's history were reviewed and updated as appropriate: allergies, current medications, past family history, past medical history, past social history, past surgical history and problem list.    Review of Systems    Constitutional: Positive for activity change. Negative for appetite change, chills, diaphoresis, fatigue, fever and unexpected weight change.   Respiratory: Negative for cough and shortness of breath.    Cardiovascular: Negative for chest pain and palpitations.   Gastrointestinal: Negative for abdominal pain, constipation, diarrhea, nausea and vomiting.   Neurological: Positive for seizures and weakness. Negative for syncope.   Psychiatric/Behavioral: Positive for dysphoric mood. Negative for confusion. The patient is nervous/anxious.    All other systems reviewed and are negative.      Objective    /72 (BP Location: Right arm, Patient Position: Sitting, Cuff Size: Adult)   Pulse 88   Temp 98 °F (36.7 °C) (Oral)   Resp 20   Wt 82.4 kg (181 lb 9.6 oz)   SpO2 98%   BMI 27.61 kg/m²     Physical Exam   Constitutional: He is oriented to person, place, and time. He appears well-developed and well-nourished. No distress.   HENT:   Head: Normocephalic and atraumatic.   Nose: Nose normal.   Mouth/Throat: Oropharynx is clear and moist.   Eyes: Conjunctivae and EOM are normal. Right eye exhibits no discharge. Left eye exhibits no discharge.   Neck: Normal range of motion. No tracheal deviation present. No thyromegaly present.   Cardiovascular: Normal rate, regular rhythm, normal heart sounds and intact distal pulses.   Pulmonary/Chest: Effort normal and breath sounds normal. No stridor. No respiratory distress. He has no wheezes. He exhibits no tenderness.   Abdominal: Soft. Bowel sounds are normal. He exhibits no distension. There is no tenderness.   Musculoskeletal: He exhibits no edema.   Left-sided weakness   Lymphadenopathy:     He has no cervical adenopathy.   Neurological: He is alert and oriented to person, place, and time. He exhibits normal muscle tone.   Skin: Skin is warm and dry. He is not diaphoretic.   Psychiatric: He has a normal mood and affect. His behavior is normal. Judgment and thought  content normal.   Nursing note and vitals reviewed.      Assessment/Plan   Problems Addressed this Visit        Cardiovascular and Mediastinum    HTN (hypertension)       Nervous and Auditory    Inoperable anaplastic glioma of brain  (Chronic)    Metabolic encephalopathy - Primary    Left-sided weakness        Plan:    1.  Inoperable anaplastic glioma of the brain: Chronic, discussed patient's treatment plan, patient wishes to continue aggressive therapy.  He is to complete chemo and radiation and have follow-up scans.  Discussed the importance of advanced directive.    2.  Hypertension: Chronic, controlled.  Reviewed patient's medications.  Continue on current medications.    3.  Metabolic encephalopathy: New, resolved.  Patient is extremely alert today.  Reviewed patient's hospital imaging and lab testing as well as hospital course as dictated in discharge summary.  Discussed how his encephalopathy was likely multifactorial.  Advised on warning signs to monitor for.  We will recheck labs including ammonia.    4.  Left-sided weakness: Chronic, stable.  Patient is ambulating well today.  Continue working with home health and physical therapy services.          This document has been electronically signed by Malia Vargas MD on August 6, 2019 3:26 PM

## 2019-08-07 ENCOUNTER — OFFICE VISIT (OUTPATIENT)
Dept: NEUROLOGY | Facility: CLINIC | Age: 54
End: 2019-08-07

## 2019-08-07 VITALS
WEIGHT: 188 LBS | HEART RATE: 70 BPM | SYSTOLIC BLOOD PRESSURE: 120 MMHG | DIASTOLIC BLOOD PRESSURE: 88 MMHG | BODY MASS INDEX: 28.49 KG/M2 | HEIGHT: 68 IN

## 2019-08-07 DIAGNOSIS — C71.9 ANAPLASTIC GLIOMA OF BRAIN (HCC): Primary | Chronic | ICD-10-CM

## 2019-08-07 DIAGNOSIS — G47.33 OBSTRUCTIVE SLEEP APNEA: ICD-10-CM

## 2019-08-07 DIAGNOSIS — R56.9 SEIZURES (HCC): ICD-10-CM

## 2019-08-07 DIAGNOSIS — G81.94 LEFT HEMIPARESIS (HCC): ICD-10-CM

## 2019-08-07 PROCEDURE — 99214 OFFICE O/P EST MOD 30 MIN: CPT | Performed by: PHYSICIAN ASSISTANT

## 2019-08-07 RX ORDER — LEVETIRACETAM 250 MG/1
250 TABLET ORAL 2 TIMES DAILY
Qty: 60 TABLET | Refills: 5 | Status: SHIPPED | OUTPATIENT
Start: 2019-08-07 | End: 2019-12-30

## 2019-08-07 RX ORDER — LEVETIRACETAM 1000 MG/1
1000 TABLET ORAL 2 TIMES DAILY
Qty: 60 TABLET | Refills: 5 | Status: SHIPPED | OUTPATIENT
Start: 2019-08-07 | End: 2019-12-30

## 2019-08-07 RX ORDER — PROMETHAZINE HYDROCHLORIDE 12.5 MG/1
12.5 TABLET ORAL EVERY 4 HOURS PRN
Qty: 60 TABLET | Refills: 1 | Status: SHIPPED | OUTPATIENT
Start: 2019-08-07 | End: 2019-09-03 | Stop reason: SDUPTHER

## 2019-08-07 NOTE — PROGRESS NOTES
Subjective   Lukasz Paxton Savage is a 54 y.o. male is here today for follow-up.    History of PRES with seizures, AED D/C'd 9/2017 with no further seizure.  Last ETOH 7/2017.  New symptoms including numbness on the left side of his body including the face.  Some weakness in the left side of the face.  The symptoms are associated with headache.  This headache tends to dominate on the right but will eventually involve both sides.  No tonic-clonic seizure activity or persistent neurologic deficit.  Relates that these episodes are brief, lasting 4-8 minutes and resolving completely.      Recently presented with increased difficulty with weakness in his left hand and continues to have occasional episodes of transient symptoms in the left upper extremity, face and at times left lower extremity.  Symptoms include involuntary twitching and moving of the left upper extremity, face on the left without loss of consciousness or generalized seizure-like activity.  The patient also has occasional stumbling involving the left lower extremity and will drop objects from his left hand.  He feels as though his headache has intensified somewhat.    28 March 2019 EEG did not show seizure activity.  2 April 2019 carotid vertebral ultrasound not showing any significant stenosis or issues.  2 April 2019 MRI of the brain shows substantial abnormalities.    Since last seen the patient has had no reported seizures, Keppra dose is at 1250 mg twice per day, Vimpat 150 mg twice per day, Depakote 1000 mg 3 times per day.  He reports that he is converting to maintenance chemotherapy and has completed radiation therapy.  He has follow-up scheduled neurosurgery (Dr. Trevino) with a follow-up MRI this month.  Subjectively, patient reports overall he is improved over his last visit.      Neurologic Problem   The patient's primary symptoms include an altered mental status, focal sensory loss, focal weakness, a loss of balance, memory loss, slurred  speech and weakness. This is a chronic problem. The current episode started more than 1 month ago. The neurological problem developed suddenly. The problem has been gradually improving since onset. There was left-sided focality noted. Associated symptoms include fatigue, headaches, nausea and vomiting. Pertinent negatives include no confusion. Past treatments include sleep, bed rest, position change and acetaminophen. The treatment provided no relief. (PRES 2017)   Seizures    This is a recurrent problem. The current episode started more than 1 week ago. The problem has not changed since onset.There were more than 10 seizures. The most recent episode lasted 30 to 120 seconds. Associated symptoms include headaches, nausea and vomiting. Pertinent negatives include no confusion and no speech difficulty. Characteristics include rhythmic jerking, loss of consciousness and bit tongue. The episode was witnessed. There was no sensation of an aura present. The seizure(s) had left-sided focality.   Brain Tumor   This is a chronic problem. The current episode started more than 1 month ago. The problem occurs constantly. The problem has been unchanged. Associated symptoms include fatigue, headaches, nausea, vomiting and weakness. The symptoms are aggravated by exertion and stress. He has tried rest for the symptoms. The treatment provided mild relief.        The following portions of the patient's history were reviewed and updated as appropriate: allergies, current medications, past family history, past medical history, past social history, past surgical history and problem list.    Review of Systems   Constitutional: Positive for fatigue.   HENT: Negative for trouble swallowing.    Eyes: Positive for photophobia.   Respiratory: Negative.    Cardiovascular: Negative.    Gastrointestinal: Positive for nausea and vomiting.   Endocrine: Negative.    Genitourinary: Negative.    Musculoskeletal: Positive for gait problem.   Skin:  Negative.    Allergic/Immunologic: Negative.    Neurological: Positive for tremors, focal weakness, loss of consciousness, facial asymmetry, weakness, headache, loss of balance and memory problem. Negative for seizures, speech difficulty and confusion.   Hematological: Negative.    Psychiatric/Behavioral: Positive for agitation, decreased concentration, dysphoric mood, hallucinations, memory loss and sleep disturbance. The patient is nervous/anxious.        Objective   Physical Exam   Constitutional: He is oriented to person, place, and time.   HENT:   Head: Normocephalic and atraumatic.   Right Ear: Hearing and external ear normal.   Left Ear: Hearing and external ear normal.   Nose: Nose normal.   Mouth/Throat: Uvula is midline, oropharynx is clear and moist and mucous membranes are normal.   Eyes: Conjunctivae, EOM and lids are normal. Pupils are equal, round, and reactive to light.   Neck: Trachea normal and normal range of motion. No JVD present. Carotid bruit is not present.   Cardiovascular: Normal rate, regular rhythm and normal heart sounds.   No murmur heard.  Pulmonary/Chest: Effort normal and breath sounds normal.   Neurological: He is alert and oriented to person, place, and time. He displays tremor. He displays no atrophy. No cranial nerve deficit or sensory deficit. He exhibits abnormal muscle tone. Gait abnormal. GCS eye subscore is 4. GCS verbal subscore is 5. GCS motor subscore is 6.   Reflex Scores:       Bicep reflexes are 2+ on the right side and 3+ on the left side.       Brachioradialis reflexes are 2+ on the right side and 3+ on the left side.       Patellar reflexes are 2+ on the right side and 3+ on the left side.  Worsening left hemiparesis worse in the upper extremity, worse distal to proximal.   Skin: Skin is warm, dry and intact.   Psychiatric: His speech is normal. Judgment and thought content normal. His mood appears anxious. He is slowed. Cognition and memory are normal. He is  attentive.   Nursing note and vitals reviewed.        Assessment/Plan   Lukasz was seen today for seizures.    Diagnoses and all orders for this visit:    Inoperable anaplastic glioma of brain     Seizures (CMS/HCC)    Left hemiparesis (CMS/HCC)    Obstructive sleep apnea    At this point the patient is responding fairly well to therapy.  His left-sided hemiparesis is stable, seizures have been under better control and there have not been any recent admissions for seizure control.  No changes are recommended in his present medication other than we will have him utilize a combination of Keppra 1000 mg tablets and 250 mg tablets to make dosage easier and will change his Vimpat to 150 mg tablets for the same reason.    The patient and his mother have expressed their concerns and questions, today's findings and recommendations, actions to take care of seizure, signs or symptoms that would require immediate medical or neurologic attention are all reviewed in detail.      20 minutes of a 25 minute outpatient visit was spent in counseling and coordination of care today.           Dictated utilizing Dragon dictation.

## 2019-08-08 ENCOUNTER — OUTSIDE FACILITY SERVICE (OUTPATIENT)
Dept: FAMILY MEDICINE CLINIC | Facility: CLINIC | Age: 54
End: 2019-08-08

## 2019-08-08 ENCOUNTER — READMISSION MANAGEMENT (OUTPATIENT)
Dept: CALL CENTER | Facility: HOSPITAL | Age: 54
End: 2019-08-08

## 2019-08-08 NOTE — OUTREACH NOTE
Medical Week 3 Survey      Responses   Facility patient discharged from?  [facility]   Does the patient have one of the following disease processes/diagnoses(primary or secondary)?  Other   Week 3 attempt successful?  No   Unsuccessful attempts  Attempt 1          Lindsey Heath RN

## 2019-08-09 ENCOUNTER — READMISSION MANAGEMENT (OUTPATIENT)
Dept: CALL CENTER | Facility: HOSPITAL | Age: 54
End: 2019-08-09

## 2019-08-09 NOTE — OUTREACH NOTE
Medical Week 3 Survey      Responses   Facility patient discharged from?  [facility]   Does the patient have one of the following disease processes/diagnoses(primary or secondary)?  Other   Week 3 attempt successful?  No   Unsuccessful attempts  Attempt 2          Syeda Deleon RN

## 2019-08-12 DIAGNOSIS — C71.9 ANAPLASTIC GLIOMA OF BRAIN (HCC): Primary | ICD-10-CM

## 2019-08-12 DIAGNOSIS — G81.94 LEFT HEMIPARESIS (HCC): ICD-10-CM

## 2019-08-12 DIAGNOSIS — G47.10 HYPERSOMNOLENCE: ICD-10-CM

## 2019-08-12 DIAGNOSIS — G47.33 OBSTRUCTIVE SLEEP APNEA (ADULT) (PEDIATRIC): ICD-10-CM

## 2019-08-15 ENCOUNTER — TELEPHONE (OUTPATIENT)
Dept: FAMILY MEDICINE CLINIC | Facility: CLINIC | Age: 54
End: 2019-08-15

## 2019-08-15 RX ORDER — LACOSAMIDE 150 MG/1
150 TABLET ORAL 2 TIMES DAILY
Qty: 60 TABLET | Refills: 2 | OUTPATIENT
Start: 2019-08-15 | End: 2019-10-01 | Stop reason: SDUPTHER

## 2019-08-15 NOTE — TELEPHONE ENCOUNTER
Patient mother called and states that patient is no longer receiving home health and that she needed to know when the patient needed to have another ammonia level drawn if you could please review and adivse.

## 2019-08-15 NOTE — TELEPHONE ENCOUNTER
I would recommend drawing it in 2 weeks unless he is having worsening confusion or lethargy before then

## 2019-08-16 ENCOUNTER — HOSPITAL ENCOUNTER (OUTPATIENT)
Dept: SLEEP MEDICINE | Facility: HOSPITAL | Age: 54
Discharge: HOME | End: 2019-08-16
Admitting: NURSE PRACTITIONER

## 2019-08-16 VITALS
SYSTOLIC BLOOD PRESSURE: 132 MMHG | OXYGEN SATURATION: 97 % | WEIGHT: 183 LBS | HEIGHT: 71 IN | RESPIRATION RATE: 18 BRPM | BODY MASS INDEX: 25.62 KG/M2 | HEART RATE: 71 BPM | DIASTOLIC BLOOD PRESSURE: 101 MMHG

## 2019-08-16 DIAGNOSIS — G47.33 OBSTRUCTIVE SLEEP APNEA: ICD-10-CM

## 2019-08-16 DIAGNOSIS — G47.10 HYPERSOMNOLENCE: ICD-10-CM

## 2019-08-16 DIAGNOSIS — G47.33 OBSTRUCTIVE SLEEP APNEA (ADULT) (PEDIATRIC): ICD-10-CM

## 2019-08-16 DIAGNOSIS — C71.9 ANAPLASTIC GLIOMA OF BRAIN (HCC): Chronic | ICD-10-CM

## 2019-08-16 DIAGNOSIS — C71.9 ANAPLASTIC GLIOMA OF BRAIN (HCC): ICD-10-CM

## 2019-08-16 DIAGNOSIS — G81.94 LEFT HEMIPARESIS (HCC): ICD-10-CM

## 2019-08-16 PROCEDURE — 95810 POLYSOM 6/> YRS 4/> PARAM: CPT

## 2019-08-16 PROCEDURE — 95810 POLYSOM 6/> YRS 4/> PARAM: CPT | Performed by: PSYCHIATRY & NEUROLOGY

## 2019-08-16 NOTE — TELEPHONE ENCOUNTER
Patient mother reports that he has been very lethargic past 3 days patient mother may bring him in MOnday for lab

## 2019-08-22 ENCOUNTER — TRANSCRIBE ORDERS (OUTPATIENT)
Dept: SLEEP MEDICINE | Facility: HOSPITAL | Age: 54
End: 2019-08-22

## 2019-08-22 DIAGNOSIS — G47.33 OBSTRUCTIVE SLEEP APNEA: Primary | ICD-10-CM

## 2019-08-27 ENCOUNTER — TRANSCRIBE ORDERS (OUTPATIENT)
Dept: ADMINISTRATIVE | Facility: HOSPITAL | Age: 54
End: 2019-08-27

## 2019-08-27 ENCOUNTER — OFFICE VISIT (OUTPATIENT)
Dept: NEUROSURGERY | Facility: CLINIC | Age: 54
End: 2019-08-27

## 2019-08-27 ENCOUNTER — APPOINTMENT (OUTPATIENT)
Dept: LAB | Facility: HOSPITAL | Age: 54
End: 2019-08-27

## 2019-08-27 ENCOUNTER — HOSPITAL ENCOUNTER (OUTPATIENT)
Dept: MRI IMAGING | Facility: HOSPITAL | Age: 54
Discharge: HOME | End: 2019-08-27
Admitting: NEUROLOGICAL SURGERY

## 2019-08-27 VITALS
WEIGHT: 183 LBS | HEIGHT: 71 IN | SYSTOLIC BLOOD PRESSURE: 138 MMHG | DIASTOLIC BLOOD PRESSURE: 80 MMHG | BODY MASS INDEX: 25.62 KG/M2

## 2019-08-27 DIAGNOSIS — Z78.9 NON-SMOKER: ICD-10-CM

## 2019-08-27 DIAGNOSIS — C71.9 ASTROCYTOMA BRAIN TUMOR (HCC): ICD-10-CM

## 2019-08-27 DIAGNOSIS — R41.82 ALTERED MENTAL STATUS, UNSPECIFIED ALTERED MENTAL STATUS TYPE: ICD-10-CM

## 2019-08-27 DIAGNOSIS — E55.9 VITAMIN D DEFICIENCY, UNSPECIFIED: ICD-10-CM

## 2019-08-27 DIAGNOSIS — N18.30 CHRONIC KIDNEY DISEASE, STAGE III (MODERATE) (HCC): Primary | ICD-10-CM

## 2019-08-27 DIAGNOSIS — C71.9 ASTROCYTOMA BRAIN TUMOR (HCC): Primary | ICD-10-CM

## 2019-08-27 LAB
25(OH)D3 SERPL-MCNC: 36.2 NG/ML (ref 30–100)
ALBUMIN SERPL-MCNC: 3.7 G/DL (ref 3.5–5)
ALBUMIN/GLOB SERPL: 1.3 G/DL (ref 1.1–2.5)
ALP SERPL-CCNC: 53 U/L (ref 24–120)
ALT SERPL W P-5'-P-CCNC: 48 U/L (ref 0–54)
ANION GAP SERPL CALCULATED.3IONS-SCNC: 5 MMOL/L (ref 4–13)
AST SERPL-CCNC: 57 U/L (ref 7–45)
BILIRUB SERPL-MCNC: 0.6 MG/DL (ref 0.1–1)
BILIRUB UR QL STRIP: NEGATIVE
BUN BLD-MCNC: 10 MG/DL (ref 5–21)
BUN/CREAT SERPL: 11.5 (ref 7–25)
CALCIUM SPEC-SCNC: 9.3 MG/DL (ref 8.4–10.4)
CHLORIDE SERPL-SCNC: 106 MMOL/L (ref 98–110)
CLARITY UR: CLEAR
CO2 SERPL-SCNC: 31 MMOL/L (ref 24–31)
COLOR UR: ABNORMAL
CREAT BLD-MCNC: 0.87 MG/DL (ref 0.5–1.4)
CREAT BLDA-MCNC: 1 MG/DL (ref 0.6–1.3)
CREAT UR-MCNC: 253.5 MG/DL
DEPRECATED RDW RBC AUTO: 57.1 FL (ref 37–54)
ERYTHROCYTE [DISTWIDTH] IN BLOOD BY AUTOMATED COUNT: 16.9 % (ref 12.3–15.4)
GFR SERPL CREATININE-BSD FRML MDRD: 92 ML/MIN/1.73
GLOBULIN UR ELPH-MCNC: 2.8 GM/DL
GLUCOSE BLD-MCNC: 90 MG/DL (ref 70–100)
GLUCOSE UR STRIP-MCNC: NEGATIVE MG/DL
HCT VFR BLD AUTO: 38.4 % (ref 37.5–51)
HGB BLD-MCNC: 12.7 G/DL (ref 13–17.7)
HGB UR QL STRIP.AUTO: NEGATIVE
KETONES UR QL STRIP: ABNORMAL
LEUKOCYTE ESTERASE UR QL STRIP.AUTO: NEGATIVE
MAGNESIUM SERPL-MCNC: 1.9 MG/DL (ref 1.4–2.2)
MCH RBC QN AUTO: 30.2 PG (ref 26.6–33)
MCHC RBC AUTO-ENTMCNC: 33.1 G/DL (ref 31.5–35.7)
MCV RBC AUTO: 91.4 FL (ref 79–97)
NITRITE UR QL STRIP: NEGATIVE
PH UR STRIP.AUTO: 7.5 [PH] (ref 5–8)
PHOSPHATE SERPL-MCNC: 3.7 MG/DL (ref 2.5–4.5)
PLATELET # BLD AUTO: 139 10*3/MM3 (ref 140–450)
PMV BLD AUTO: 9.7 FL (ref 6–12)
POTASSIUM BLD-SCNC: 3.9 MMOL/L (ref 3.5–5.3)
PROT SERPL-MCNC: 6.5 G/DL (ref 6.3–8.7)
PROT UR QL STRIP: NEGATIVE
PROT UR-MCNC: 21 MG/DL (ref 0–13.5)
PTH-INTACT SERPL-MCNC: 52.4 PG/ML (ref 7.5–53.5)
RBC # BLD AUTO: 4.2 10*6/MM3 (ref 4.14–5.8)
SODIUM BLD-SCNC: 142 MMOL/L (ref 135–145)
SP GR UR STRIP: >1.03 (ref 1–1.03)
URATE SERPL-MCNC: 8.5 MG/DL (ref 3.5–8.5)
UROBILINOGEN UR QL STRIP: ABNORMAL
WBC NRBC COR # BLD: 8.08 10*3/MM3 (ref 3.4–10.8)

## 2019-08-27 PROCEDURE — 82306 VITAMIN D 25 HYDROXY: CPT | Performed by: NURSE PRACTITIONER

## 2019-08-27 PROCEDURE — 80053 COMPREHEN METABOLIC PANEL: CPT | Performed by: NURSE PRACTITIONER

## 2019-08-27 PROCEDURE — 82570 ASSAY OF URINE CREATININE: CPT | Performed by: NURSE PRACTITIONER

## 2019-08-27 PROCEDURE — 0 GADOBENATE DIMEGLUMINE 529 MG/ML SOLUTION: Performed by: NEUROLOGICAL SURGERY

## 2019-08-27 PROCEDURE — 81003 URINALYSIS AUTO W/O SCOPE: CPT | Performed by: NURSE PRACTITIONER

## 2019-08-27 PROCEDURE — 99214 OFFICE O/P EST MOD 30 MIN: CPT | Performed by: NEUROLOGICAL SURGERY

## 2019-08-27 PROCEDURE — A9577 INJ MULTIHANCE: HCPCS | Performed by: NEUROLOGICAL SURGERY

## 2019-08-27 PROCEDURE — 84100 ASSAY OF PHOSPHORUS: CPT | Performed by: NURSE PRACTITIONER

## 2019-08-27 PROCEDURE — 82565 ASSAY OF CREATININE: CPT

## 2019-08-27 PROCEDURE — 70553 MRI BRAIN STEM W/O & W/DYE: CPT

## 2019-08-27 PROCEDURE — 83970 ASSAY OF PARATHORMONE: CPT | Performed by: NURSE PRACTITIONER

## 2019-08-27 PROCEDURE — 85027 COMPLETE CBC AUTOMATED: CPT | Performed by: NURSE PRACTITIONER

## 2019-08-27 PROCEDURE — 84550 ASSAY OF BLOOD/URIC ACID: CPT | Performed by: NURSE PRACTITIONER

## 2019-08-27 PROCEDURE — 83735 ASSAY OF MAGNESIUM: CPT | Performed by: NURSE PRACTITIONER

## 2019-08-27 PROCEDURE — 36415 COLL VENOUS BLD VENIPUNCTURE: CPT | Performed by: NURSE PRACTITIONER

## 2019-08-27 PROCEDURE — 84156 ASSAY OF PROTEIN URINE: CPT | Performed by: NURSE PRACTITIONER

## 2019-08-27 RX ORDER — LEVETIRACETAM 750 MG/1
TABLET ORAL
Refills: 2 | COMMUNITY
Start: 2019-08-22 | End: 2019-09-04

## 2019-08-27 RX ORDER — LOSARTAN POTASSIUM 25 MG/1
TABLET ORAL
Refills: 0 | COMMUNITY
Start: 2019-08-22 | End: 2019-09-04

## 2019-08-27 RX ORDER — ALLOPURINOL 300 MG/1
300 TABLET ORAL DAILY
Refills: 0 | COMMUNITY
Start: 2019-08-22 | End: 2019-09-04

## 2019-08-27 RX ORDER — DEXAMETHASONE 2 MG/1
TABLET ORAL
Qty: 51 TABLET | Refills: 0 | Status: SHIPPED | OUTPATIENT
Start: 2019-08-27 | End: 2019-09-12

## 2019-08-27 RX ADMIN — GADOBENATE DIMEGLUMINE 15 ML: 529 INJECTION, SOLUTION INTRAVENOUS at 12:42

## 2019-08-27 NOTE — PROGRESS NOTES
SUBJECTIVE:  Patient ID: Lukasz Savage is a 54 y.o. male is here today for follow-up.    Chief Complaint: Anaplastic astrocytoma  Chief Complaint   Patient presents with   • Brain Tumor     patient is here for a follow up; patient had an MRI today @ [facility]       HPI  This is a 54-year-old male gentleman who went to the operating room and April 2019 for a stereotactic brain biopsy which did turn out to be an anaplastic glioma.  He is here in follow-up today.  Patient has been admitted to the hospital multiple times due to seizure activity.  He is currently being followed by neurology.   Currently he is taking Temodar.  His radiation treatments are completed. He is currently taking Keppra, Depakote, and Vimpat for seizure management.  He is complaining of some left hand numbness and tingling in left upper and lower extremity weakness.  He has had no seizures in about 6 weeks since he was discharged July 19, 2019.  Recently completed a course of home health physical therapy.    The following portions of the patient's history were reviewed and updated as appropriate: allergies, current medications, past family history, past medical history, past social history, past surgical history and problem list.    OBJECTIVE:    Review of Systems   Musculoskeletal: Positive for gait problem.   Neurological: Positive for seizures, weakness and numbness.   All other systems reviewed and are negative.         Physical Exam   Constitutional: He is oriented to person, place, and time.   Neurological: He is oriented to person, place, and time. He has an abnormal Tandem Gait Test. He has a normal Romberg Test.   Psychiatric: His speech is normal.       Neurologic Exam     Mental Status   Oriented to person, place, and time.   Attention: normal.   Speech: speech is normal   Level of consciousness: alert  Knowledge: good.     Cranial Nerves   Cranial nerves II through XII intact.     Motor Exam   Muscle bulk: normal  Overall muscle tone:  normal  Right arm pronator drift: absent  Left arm pronator drift: absentRight upper and lower extremity 5 out of 5 in all flexion-extension movements    Left upper and lower extremity 3-4 out of 5 in all flexion extension movements.     Sensory Exam   Light touch normal.   Pinprick normal.     Gait, Coordination, and Reflexes     Gait  Gait: (Slow gait with short step length but steady.)    Coordination   Romberg: negative  Tandem walking coordination: abnormal    Tremor   Resting tremor: absent  Intention tremor: absent  Action tremor: absent    Reflexes   Reflexes 2+ except as noted.       Independent Review of Radiographic Studies:                                   June 2019 AUGUST 2019      ASSESSMENT/PLAN:  The patient is recovering well from his difficulties with seizures in July.  I do think he has some improvement still to make.  I did extensive discussion with the radiologist about the MRI findings I think that the overall tumor burden has not increased in fact it may be a little better.  I think the white matter changes that were seeing her edema/radiation effect.  We are to put him on a dedicated course of steroids for about 9 days and we are in order outpatient physical therapy for strength gait and balance training.  We will see him in follow-up in about 3 months with repeat imaging.      1. Astrocytoma brain tumor (CMS/HCC)    2. Altered mental status, unspecified altered mental status type    3. Non-smoker            No Follow-up on file.      Dillon Trevino MD

## 2019-08-28 ENCOUNTER — OFFICE VISIT (OUTPATIENT)
Dept: HEMATOLOGY | Age: 54
End: 2019-08-28
Payer: MEDICAID

## 2019-08-28 ENCOUNTER — HOSPITAL ENCOUNTER (OUTPATIENT)
Dept: INFUSION THERAPY | Age: 54
Discharge: HOME | End: 2019-08-28
Payer: MEDICAID

## 2019-08-28 VITALS
HEIGHT: 68 IN | DIASTOLIC BLOOD PRESSURE: 86 MMHG | WEIGHT: 184.7 LBS | HEART RATE: 80 BPM | OXYGEN SATURATION: 96 % | SYSTOLIC BLOOD PRESSURE: 142 MMHG | BODY MASS INDEX: 27.99 KG/M2

## 2019-08-28 DIAGNOSIS — T50.905A DRUG-INDUCED HEPATIC TOXICITY: ICD-10-CM

## 2019-08-28 DIAGNOSIS — R53.83 FATIGUE DUE TO TREATMENT: ICD-10-CM

## 2019-08-28 DIAGNOSIS — K71.6 DRUG-INDUCED HEPATIC TOXICITY: ICD-10-CM

## 2019-08-28 DIAGNOSIS — C71.9 ASTROCYTOMA (HCC): ICD-10-CM

## 2019-08-28 DIAGNOSIS — C71.9 ANAPLASTIC GLIOMA OF BRAIN (HCC): Primary | ICD-10-CM

## 2019-08-28 DIAGNOSIS — Z74.3 REQUIRES CONTINUOUS SUPERVISION FOR ACTIVITIES OF DAILY LIVING (ADL): ICD-10-CM

## 2019-08-28 DIAGNOSIS — T45.1X5A ANEMIA ASSOCIATED WITH CHEMOTHERAPY: ICD-10-CM

## 2019-08-28 DIAGNOSIS — C71.9 ASTROCYTOMA (HCC): Primary | ICD-10-CM

## 2019-08-28 DIAGNOSIS — D69.59 THROMBOCYTOPENIA DUE TO DRUGS: ICD-10-CM

## 2019-08-28 DIAGNOSIS — D64.81 ANEMIA ASSOCIATED WITH CHEMOTHERAPY: ICD-10-CM

## 2019-08-28 DIAGNOSIS — T50.905A THROMBOCYTOPENIA DUE TO DRUGS: ICD-10-CM

## 2019-08-28 PROCEDURE — 85025 COMPLETE CBC W/AUTO DIFF WBC: CPT

## 2019-08-28 PROCEDURE — 99214 OFFICE O/P EST MOD 30 MIN: CPT | Performed by: NURSE PRACTITIONER

## 2019-08-28 RX ORDER — TEMOZOLOMIDE 100 MG/1
CAPSULE ORAL
Qty: 15 CAPSULE | Refills: 5 | Status: SHIPPED | OUTPATIENT
Start: 2019-08-28 | End: 2020-01-10

## 2019-08-28 NOTE — PROGRESS NOTES
Progress Note      Pt Name: Ade Garcia  YOB: 1965  MRN: [id number]    Date of evaluation: 9/6/2019  History Obtained From:  patient, electronic medical record    CHIEF COMPLAINT:    Chief Complaint   Patient presents with    Other     Astrocytoma    Fatigue    Nausea     HISTORY OF PRESENT ILLNESS:    Ade Garcia is a 49 y.o.  male with significant PMH of anaplastic glioma, diagnosed April 2019. He completed combined modality therapy with radiation and Temodar as of 7/30/2019. He tolerated treatment overall fairly well with the exception of several hospitalizations related to seizure activity and decline in performance status. Michael Bueno returns today for evaluation to initiate palliative Temodar 150 mg/m² days 1-5 on a 28 day regimen. Temodar will be continued until intolerance or progression of disease. Michael Bueno presents today accompanied by his mother, who is his primary caregiver. Michael Bueno continues to have ongoing left sided weakness, decreased coordination and gait problems. He is ambulating with a cane and is able to perform most of his own self-care. He reports having no seizure activity in the last 6 weeks, Dr. Remi Kirk is managing his anticonvulsant regimen (Vimpat, Keppra and Depakote. Michael Bueno continues to have some memory issues, although verbally responds appropriately today. He continues to have complaint of intermittent nausea that is overall controlled with Zofran. His weight remains stable and he reports his appetite is good. Michael Bueno indicates that his headaches are presently tolerable and controlled with the Percocet. He continues to have significant fatigue and his mother reports that he is sleeping all the time. I encouraged Michael Bueno to take frequent periods of rest between activity.  I discussed with his mother that Michael Bueno is expected to sleep quite a bit, although he needs to get up frequently and ambulate and participate in Current Medications:    Current Outpatient Medications   Medication Sig Dispense Refill    ALPRAZolam (XANAX) 0.25 MG tablet Take 0.25 mg by mouth 2 times daily as needed for Sleep.  aspirin 81 MG tablet Take 81 mg by mouth daily      divalproex (DEPAKOTE) 500 MG DR tablet Take 1,500 mg by mouth 3 times daily      Omega-3 Fatty Acids (FISH OIL) 1000 MG CAPS Take 2,000 mg by mouth daily (with breakfast)      lacosamide (VIMPAT) 50 MG TABS tablet Take 150 mg by mouth every 12 hours.  levETIRAcetam (KEPPRA) 500 MG tablet Take 1,250 mg by mouth 2 times daily      oxyCODONE-acetaminophen (PERCOCET) 5-325 MG per tablet Take 1 tablet by mouth every 6 hours as needed for Pain.  vitamin B-6 (PYRIDOXINE) 50 MG tablet Take 100 mg by mouth daily      ondansetron (ZOFRAN) 4 MG tablet Take 1 tablet by mouth daily as needed for Nausea or Vomiting 20 tablet 0    omeprazole (PRILOSEC) 10 MG delayed release capsule Take 10 mg by mouth daily      venlafaxine (EFFEXOR XR) 75 MG extended release capsule Take 150 mg by mouth daily       LYRICA 100 MG capsule   0    temozolomide (TEMODAR) 100 MG chemo capsule Take 3 capsules (300mg) once a day on days 1 -5  Every 28 days 15 capsule 5    diazepam (DIASTAT) 10 MG GEL Place 10 mg rectally once as needed (seizure) for up to 1 dose.  15 each 3    allopurinol (ZYLOPRIM) 300 MG tablet Take 300 mg by mouth daily      dexamethasone (DECADRON) 2 MG tablet Take 2 mg by mouth 2 times daily (with meals) Starting 6/21 take 3 tabs by mouth 2 times a day for 3 days, then 1.5 tabs 2 times a day for 10 days      QUEtiapine (SEROQUEL) 50 MG tablet Take 50 mg by mouth nightly      spironolactone (ALDACTONE) 25 MG tablet Take 25 mg by mouth daily      carvedilol (COREG) 25 MG tablet Take 25 mg by mouth 2 times daily (with meals)      losartan (COZAAR) 100 MG tablet Take 100 mg by mouth daily      fenofibrate (TRICOR) 145 MG tablet Take 145 mg by mouth nightly       No

## 2019-09-03 RX ORDER — PROMETHAZINE HYDROCHLORIDE 12.5 MG/1
12.5 TABLET ORAL EVERY 4 HOURS PRN
Qty: 60 TABLET | Refills: 1 | Status: SHIPPED | OUTPATIENT
Start: 2019-09-03 | End: 2019-11-27 | Stop reason: SDUPTHER

## 2019-09-04 ENCOUNTER — OFFICE VISIT (OUTPATIENT)
Dept: NEUROLOGY | Facility: CLINIC | Age: 54
End: 2019-09-04

## 2019-09-04 ENCOUNTER — APPOINTMENT (OUTPATIENT)
Dept: LAB | Facility: HOSPITAL | Age: 54
End: 2019-09-04

## 2019-09-04 VITALS
DIASTOLIC BLOOD PRESSURE: 100 MMHG | HEART RATE: 76 BPM | SYSTOLIC BLOOD PRESSURE: 142 MMHG | HEIGHT: 71 IN | WEIGHT: 184 LBS | BODY MASS INDEX: 25.76 KG/M2

## 2019-09-04 DIAGNOSIS — I10 ESSENTIAL HYPERTENSION: ICD-10-CM

## 2019-09-04 DIAGNOSIS — R56.9 SEIZURES (HCC): ICD-10-CM

## 2019-09-04 DIAGNOSIS — C71.9 ASTROCYTOMA BRAIN TUMOR (HCC): Primary | ICD-10-CM

## 2019-09-04 DIAGNOSIS — G81.94 LEFT HEMIPARESIS (HCC): ICD-10-CM

## 2019-09-04 DIAGNOSIS — G47.33 OBSTRUCTIVE SLEEP APNEA: ICD-10-CM

## 2019-09-04 LAB
ALBUMIN SERPL-MCNC: 3.4 G/DL (ref 3.5–5)
ALBUMIN/GLOB SERPL: 1.4 G/DL (ref 1.1–2.5)
ALP SERPL-CCNC: 57 U/L (ref 24–120)
ALT SERPL W P-5'-P-CCNC: 53 U/L (ref 0–54)
AMMONIA BLD-SCNC: <9 UMOL/L (ref 9–33)
ANION GAP SERPL CALCULATED.3IONS-SCNC: 5 MMOL/L (ref 4–13)
AST SERPL-CCNC: 49 U/L (ref 7–45)
BASOPHILS # BLD AUTO: 0.01 10*3/MM3 (ref 0–0.2)
BASOPHILS NFR BLD AUTO: 0.1 % (ref 0–1.5)
BILIRUB SERPL-MCNC: 0.4 MG/DL (ref 0.1–1)
BUN BLD-MCNC: 26 MG/DL (ref 5–21)
BUN/CREAT SERPL: 28.9 (ref 7–25)
CALCIUM SPEC-SCNC: 8.7 MG/DL (ref 8.4–10.4)
CHLORIDE SERPL-SCNC: 105 MMOL/L (ref 98–110)
CO2 SERPL-SCNC: 30 MMOL/L (ref 24–31)
CREAT BLD-MCNC: 0.9 MG/DL (ref 0.5–1.4)
DEPRECATED RDW RBC AUTO: 55.6 FL (ref 37–54)
EOSINOPHIL # BLD AUTO: 0.01 10*3/MM3 (ref 0–0.4)
EOSINOPHIL NFR BLD AUTO: 0.1 % (ref 0.3–6.2)
ERYTHROCYTE [DISTWIDTH] IN BLOOD BY AUTOMATED COUNT: 16.9 % (ref 12.3–15.4)
GFR SERPL CREATININE-BSD FRML MDRD: 88 ML/MIN/1.73
GLOBULIN UR ELPH-MCNC: 2.4 GM/DL
GLUCOSE BLD-MCNC: 97 MG/DL (ref 70–100)
HCT VFR BLD AUTO: 36.2 % (ref 37.5–51)
HGB BLD-MCNC: 12.2 G/DL (ref 13–17.7)
IMM GRANULOCYTES # BLD AUTO: 0.07 10*3/MM3 (ref 0–0.05)
IMM GRANULOCYTES NFR BLD AUTO: 0.9 % (ref 0–0.5)
LYMPHOCYTES # BLD AUTO: 1.27 10*3/MM3 (ref 0.7–3.1)
LYMPHOCYTES NFR BLD AUTO: 15.7 % (ref 19.6–45.3)
MCH RBC QN AUTO: 30.7 PG (ref 26.6–33)
MCHC RBC AUTO-ENTMCNC: 33.7 G/DL (ref 31.5–35.7)
MCV RBC AUTO: 91 FL (ref 79–97)
MONOCYTES # BLD AUTO: 0.91 10*3/MM3 (ref 0.1–0.9)
MONOCYTES NFR BLD AUTO: 11.2 % (ref 5–12)
NEUTROPHILS # BLD AUTO: 5.83 10*3/MM3 (ref 1.7–7)
NEUTROPHILS NFR BLD AUTO: 72 % (ref 42.7–76)
NRBC BLD AUTO-RTO: 0 /100 WBC (ref 0–0.2)
PLATELET # BLD AUTO: 235 10*3/MM3 (ref 140–450)
PMV BLD AUTO: 9.2 FL (ref 6–12)
POTASSIUM BLD-SCNC: 3.5 MMOL/L (ref 3.5–5.3)
PROT SERPL-MCNC: 5.8 G/DL (ref 6.3–8.7)
RBC # BLD AUTO: 3.98 10*6/MM3 (ref 4.14–5.8)
SODIUM BLD-SCNC: 140 MMOL/L (ref 135–145)
VALPROATE SERPL-MCNC: 94 MCG/ML (ref 50–100)
WBC NRBC COR # BLD: 8.1 10*3/MM3 (ref 3.4–10.8)

## 2019-09-04 PROCEDURE — 99214 OFFICE O/P EST MOD 30 MIN: CPT | Performed by: PHYSICIAN ASSISTANT

## 2019-09-04 PROCEDURE — 85025 COMPLETE CBC W/AUTO DIFF WBC: CPT | Performed by: PHYSICIAN ASSISTANT

## 2019-09-04 PROCEDURE — 36415 COLL VENOUS BLD VENIPUNCTURE: CPT | Performed by: PHYSICIAN ASSISTANT

## 2019-09-04 PROCEDURE — 82140 ASSAY OF AMMONIA: CPT | Performed by: PHYSICIAN ASSISTANT

## 2019-09-04 PROCEDURE — 80164 ASSAY DIPROPYLACETIC ACD TOT: CPT | Performed by: PHYSICIAN ASSISTANT

## 2019-09-04 PROCEDURE — 80053 COMPREHEN METABOLIC PANEL: CPT | Performed by: PHYSICIAN ASSISTANT

## 2019-09-04 RX ORDER — METOPROLOL SUCCINATE 25 MG/1
25 TABLET, EXTENDED RELEASE ORAL DAILY
Qty: 30 TABLET | Refills: 6 | Status: SHIPPED | OUTPATIENT
Start: 2019-09-04 | End: 2020-01-24 | Stop reason: SDUPTHER

## 2019-09-06 PROBLEM — T50.905A THROMBOCYTOPENIA DUE TO DRUGS: Status: ACTIVE | Noted: 2019-09-06

## 2019-09-06 PROBLEM — T50.905A DRUG-INDUCED HEPATIC TOXICITY: Status: ACTIVE | Noted: 2019-09-06

## 2019-09-06 PROBLEM — D69.59 THROMBOCYTOPENIA DUE TO DRUGS: Status: ACTIVE | Noted: 2019-09-06

## 2019-09-06 PROBLEM — D64.81 ANEMIA ASSOCIATED WITH CHEMOTHERAPY: Status: ACTIVE | Noted: 2019-09-06

## 2019-09-06 PROBLEM — R53.83 FATIGUE DUE TO TREATMENT: Status: ACTIVE | Noted: 2019-09-06

## 2019-09-06 PROBLEM — K71.6 DRUG-INDUCED HEPATIC TOXICITY: Status: ACTIVE | Noted: 2019-09-06

## 2019-09-06 PROBLEM — T45.1X5A ANEMIA ASSOCIATED WITH CHEMOTHERAPY: Status: ACTIVE | Noted: 2019-09-06

## 2019-09-06 PROBLEM — Z74.3 REQUIRES CONTINUOUS SUPERVISION FOR ACTIVITIES OF DAILY LIVING (ADL): Status: ACTIVE | Noted: 2019-09-06

## 2019-09-06 ASSESSMENT — ENCOUNTER SYMPTOMS
CONSTIPATION: 0
SORE THROAT: 0
WHEEZING: 0
BLOOD IN STOOL: 0
DIARRHEA: 0
EYE REDNESS: 0
VOMITING: 0
ABDOMINAL PAIN: 0
NAUSEA: 1
RESPIRATORY NEGATIVE: 1
SHORTNESS OF BREATH: 0
COUGH: 0
BACK PAIN: 0
EYE PAIN: 0
EYE DISCHARGE: 0

## 2019-09-06 NOTE — PROGRESS NOTES
Subjective   Lukasz Savage is a 54 y.o. male is here today for follow-up.    History of PRES with seizures, AED D/C'd 9/2017 with no further seizure.  Last ETOH 7/2017.  New symptoms including numbness on the left side of his body including the face.  Some weakness in the left side of the face.  The symptoms are associated with headache.  This headache tends to dominate on the right but will eventually involve both sides.  No tonic-clonic seizure activity or persistent neurologic deficit.  Relates that these episodes are brief, lasting 4-8 minutes and resolving completely.      Presented with increased difficulty with weakness in his left hand and continues to have occasional episodes of transient symptoms in the left upper extremity, face and at times left lower extremity.  Symptoms include involuntary twitching and moving of the left upper extremity, face on the left without loss of consciousness or generalized seizure-like activity.  The patient also has occasional stumbling involving the left lower extremity and will drop objects from his left hand.  He feels as though his headache has intensified somewhat.    28 March 2019 EEG did not show seizure activity.  2 April 2019 carotid vertebral ultrasound not showing any significant stenosis or issues.  2 April 2019 MRI of the brain shows substantial abnormalities.    He is following with neurosurgery status post excisional biopsy and radiation.  He is reporting no seizures since last seen.  He has had difficulty with some mental status changes that are occasionally exacerbated.  He has had some hypertension and has previously been on medications for this.  He and his family are very concerned about this with his past history of PRES        Neurologic Problem   The patient's primary symptoms include an altered mental status, focal sensory loss, focal weakness, a loss of balance, memory loss, slurred speech and weakness. This is a chronic problem. The current  episode started more than 1 month ago. The neurological problem developed suddenly. The problem has been gradually improving since onset. There was left-sided focality noted. Associated symptoms include fatigue, headaches, nausea and vomiting. Pertinent negatives include no confusion. Past treatments include sleep, bed rest, position change and acetaminophen. The treatment provided no relief. (PRES 2017)   Seizures    This is a recurrent problem. The current episode started more than 1 week ago. The problem has not changed since onset.There were more than 10 seizures. The most recent episode lasted 30 to 120 seconds. Associated symptoms include headaches, nausea and vomiting. Pertinent negatives include no confusion and no speech difficulty. Characteristics include rhythmic jerking, loss of consciousness and bit tongue. The episode was witnessed. There was no sensation of an aura present. The seizure(s) had left-sided focality.   Brain Tumor   This is a chronic problem. The current episode started more than 1 month ago. The problem occurs constantly. The problem has been unchanged. Associated symptoms include fatigue, headaches, nausea, vomiting and weakness. The symptoms are aggravated by exertion and stress. He has tried rest for the symptoms. The treatment provided mild relief.        The following portions of the patient's history were reviewed and updated as appropriate: allergies, current medications, past family history, past medical history, past social history, past surgical history and problem list.    Review of Systems   Constitutional: Positive for fatigue.   HENT: Negative for trouble swallowing.    Eyes: Positive for photophobia.   Respiratory: Negative.    Cardiovascular: Negative.    Gastrointestinal: Positive for nausea and vomiting.   Endocrine: Negative.    Genitourinary: Negative.    Musculoskeletal: Positive for gait problem.   Skin: Negative.    Allergic/Immunologic: Negative.    Neurological:  Positive for tremors, focal weakness, loss of consciousness, facial asymmetry, weakness, headache, loss of balance and memory problem. Negative for seizures, speech difficulty and confusion.   Hematological: Negative.    Psychiatric/Behavioral: Positive for agitation, decreased concentration, dysphoric mood, hallucinations, memory loss and sleep disturbance. The patient is nervous/anxious.        Objective   Physical Exam   Constitutional: He is oriented to person, place, and time.   HENT:   Head: Normocephalic and atraumatic.   Right Ear: Hearing and external ear normal.   Left Ear: Hearing and external ear normal.   Nose: Nose normal.   Mouth/Throat: Uvula is midline, oropharynx is clear and moist and mucous membranes are normal.   Eyes: Conjunctivae, EOM and lids are normal. Pupils are equal, round, and reactive to light.   Neck: Trachea normal and normal range of motion. No JVD present. Carotid bruit is not present.   Cardiovascular: Normal rate, regular rhythm and normal heart sounds.   No murmur heard.  Pulmonary/Chest: Effort normal and breath sounds normal.   Neurological: He is alert and oriented to person, place, and time. He displays tremor. He displays no atrophy. No cranial nerve deficit or sensory deficit. He exhibits abnormal muscle tone. Gait abnormal. GCS eye subscore is 4. GCS verbal subscore is 5. GCS motor subscore is 6.   Reflex Scores:       Bicep reflexes are 2+ on the right side and 3+ on the left side.       Brachioradialis reflexes are 2+ on the right side and 3+ on the left side.       Patellar reflexes are 2+ on the right side and 3+ on the left side.  Worsening left hemiparesis worse in the upper extremity, worse distal to proximal.   Skin: Skin is warm, dry and intact.   Psychiatric: His speech is normal. Judgment and thought content normal. His mood appears anxious. He is slowed. Cognition and memory are normal. He is attentive.   Nursing note and vitals  reviewed.        Assessment/Plan   Lukasz was seen today for seizures.    Diagnoses and all orders for this visit:    Astrocytoma brain tumor (CMS/HCC)  -     Ammonia  -     CBC & Differential  -     Comprehensive Metabolic Panel  -     Valproic Acid Level, Total  -     CBC Auto Differential    Seizures (CMS/HCC)  -     Ammonia  -     CBC & Differential  -     Comprehensive Metabolic Panel  -     Valproic Acid Level, Total  -     CBC Auto Differential    Left hemiparesis (CMS/HCC)    Obstructive sleep apnea    Essential hypertension  -     metoprolol succinate XL (TOPROL XL) 25 MG 24 hr tablet; Take 1 tablet by mouth Daily.    Recommended follow-up labs today including Depakote level and ammonia level.    Recommended to begin metoprolol XL 25 mg daily with daily blood pressure measurements.    The patient and his mother's questions and concerns are reviewed in detail.      20 minutes of a 30 minute outpatient visit was spent in counseling and coordination of care today.           Dictated utilizing Dragon dictation.

## 2019-09-10 ENCOUNTER — HOSPITAL ENCOUNTER (OUTPATIENT)
Dept: SLEEP MEDICINE | Facility: HOSPITAL | Age: 54
Discharge: HOME | End: 2019-09-10
Admitting: NURSE PRACTITIONER

## 2019-09-10 VITALS
HEART RATE: 77 BPM | DIASTOLIC BLOOD PRESSURE: 103 MMHG | HEIGHT: 68 IN | BODY MASS INDEX: 27.74 KG/M2 | WEIGHT: 183 LBS | SYSTOLIC BLOOD PRESSURE: 146 MMHG | OXYGEN SATURATION: 98 % | RESPIRATION RATE: 12 BRPM

## 2019-09-10 DIAGNOSIS — G47.33 OBSTRUCTIVE SLEEP APNEA: ICD-10-CM

## 2019-09-10 PROCEDURE — 95811 POLYSOM 6/>YRS CPAP 4/> PARM: CPT | Performed by: PSYCHIATRY & NEUROLOGY

## 2019-09-10 PROCEDURE — 95811 POLYSOM 6/>YRS CPAP 4/> PARM: CPT

## 2019-09-12 ENCOUNTER — TELEPHONE (OUTPATIENT)
Dept: FAMILY MEDICINE CLINIC | Facility: CLINIC | Age: 54
End: 2019-09-12

## 2019-09-12 ENCOUNTER — OFFICE VISIT (OUTPATIENT)
Dept: FAMILY MEDICINE CLINIC | Facility: CLINIC | Age: 54
End: 2019-09-12

## 2019-09-12 ENCOUNTER — HOSPITAL ENCOUNTER (OUTPATIENT)
Dept: GENERAL RADIOLOGY | Facility: HOSPITAL | Age: 54
Discharge: HOME | End: 2019-09-12
Admitting: FAMILY MEDICINE

## 2019-09-12 VITALS
HEIGHT: 68 IN | RESPIRATION RATE: 18 BRPM | OXYGEN SATURATION: 98 % | SYSTOLIC BLOOD PRESSURE: 162 MMHG | WEIGHT: 181.2 LBS | DIASTOLIC BLOOD PRESSURE: 100 MMHG | TEMPERATURE: 98.3 F | HEART RATE: 72 BPM | BODY MASS INDEX: 27.46 KG/M2

## 2019-09-12 DIAGNOSIS — R05.9 COUGH: Primary | ICD-10-CM

## 2019-09-12 DIAGNOSIS — C71.9 ASTROCYTOMA BRAIN TUMOR (HCC): ICD-10-CM

## 2019-09-12 DIAGNOSIS — J40 BRONCHITIS: ICD-10-CM

## 2019-09-12 DIAGNOSIS — I10 ESSENTIAL HYPERTENSION: ICD-10-CM

## 2019-09-12 PROCEDURE — 99214 OFFICE O/P EST MOD 30 MIN: CPT | Performed by: FAMILY MEDICINE

## 2019-09-12 PROCEDURE — 71046 X-RAY EXAM CHEST 2 VIEWS: CPT

## 2019-09-12 RX ORDER — AZITHROMYCIN 250 MG/1
TABLET, FILM COATED ORAL
Qty: 6 TABLET | Refills: 0 | Status: SHIPPED | OUTPATIENT
Start: 2019-09-12 | End: 2019-10-02

## 2019-09-12 NOTE — TELEPHONE ENCOUNTER
Pt mother called to report that while in office for todays visit she could start pt back on losartan. Mother reports that they have 25mg and 100mg tablets. Please verify what dose pt needs to take.

## 2019-09-12 NOTE — PROGRESS NOTES
Subjective cc: cough   Lukasz Savage is a 54 y.o. male who presents for complaint of cough and fatigue.     He has had repeat scans - showing improving.   He is very alert and talking and doing much better.    His BP has been trending up.     Cough   This is a new problem. The current episode started 1 to 4 weeks ago. The problem has been gradually worsening. The problem occurs every few minutes. The cough is productive of sputum. Pertinent negatives include no chest pain, chills, ear congestion, ear pain, fever, headaches, heartburn, hemoptysis, myalgias, nasal congestion, postnasal drip, rash, rhinorrhea, sore throat, shortness of breath, sweats, weight loss or wheezing. Associated symptoms comments: Sleeping more . Nothing aggravates the symptoms. Risk factors for lung disease include smoking/tobacco exposure. The treatment provided no relief.   Fatigue   Associated symptoms include coughing, fatigue and weakness. Pertinent negatives include no chest pain, chills, fever, headaches, myalgias, rash or sore throat.        The following portions of the patient's history were reviewed and updated as appropriate: allergies, current medications, past family history, past medical history, past social history, past surgical history and problem list.        Review of Systems   Constitutional: Positive for fatigue. Negative for activity change, appetite change, chills, fever and weight loss.   HENT: Negative for ear pain, postnasal drip, rhinorrhea and sore throat.    Respiratory: Positive for cough. Negative for hemoptysis, choking, shortness of breath and wheezing.    Cardiovascular: Negative for chest pain.   Gastrointestinal: Negative for heartburn.   Musculoskeletal: Negative for myalgias.   Skin: Negative for rash.   Allergic/Immunologic: Positive for immunocompromised state.   Neurological: Positive for tremors, seizures and weakness. Negative for headaches.   Psychiatric/Behavioral: Negative for confusion.  "  All other systems reviewed and are negative.      Objective   Blood pressure 162/100, pulse 72, temperature 98.3 °F (36.8 °C), temperature source Oral, resp. rate 18, height 172.7 cm (68\"), weight 82.2 kg (181 lb 3.2 oz), SpO2 98 %.  Physical Exam   Constitutional: He is oriented to person, place, and time. He appears well-developed and well-nourished. No distress.   HENT:   Head: Normocephalic and atraumatic.   Nose: Nose normal.   Mouth/Throat: Oropharynx is clear and moist.   Eyes: Conjunctivae and EOM are normal. Right eye exhibits no discharge. Left eye exhibits no discharge.   Neck: Normal range of motion. No tracheal deviation present. No thyromegaly present.   Cardiovascular: Normal rate, regular rhythm, normal heart sounds and intact distal pulses.   Pulmonary/Chest: Effort normal and breath sounds normal. No stridor. No respiratory distress. He has no wheezes. He exhibits no tenderness.   Abdominal: Soft. Bowel sounds are normal. He exhibits no distension. There is no tenderness.   Musculoskeletal: He exhibits no edema.   Lymphadenopathy:     He has no cervical adenopathy.   Neurological: He is alert and oriented to person, place, and time. Coordination abnormal.   Skin: Skin is warm and dry. He is not diaphoretic.   Psychiatric: He has a normal mood and affect. His behavior is normal. Judgment and thought content normal.   Nursing note and vitals reviewed.      Assessment/Plan   Problems Addressed this Visit        Cardiovascular and Mediastinum    HTN (hypertension)       Nervous and Auditory    Astrocytoma brain tumor (CMS/HCC)      Other Visit Diagnoses     Cough    -  Primary    Relevant Orders    XR Chest PA & Lateral (In Office) (Completed)    Bronchitis        Relevant Medications    azithromycin (ZITHROMAX Z-PARKER) 250 MG tablet        PLAN:     #1 bronchitis: new, will start on azithro, will get CXR to r/o PNA, advise don warning signs, return if not improving    #2 hypertension: Chronic, labile. "  Patient's blood pressure has been trending up.  Patient  To call and let us know whiuch BP medication they have and we will restart, advised on blood pressure goals.  DASH diet.    3.  Brain tumor: Chronic, following with oncology.  Reviewed recent scans.  Reviewed patient's symptoms.  Keep follow-up as scheduled.          This document has been electronically signed by Malia Vargas MD on September 24, 2019 6:07 PM

## 2019-09-13 RX ORDER — DIVALPROEX SODIUM 500 MG/1
1000 TABLET, DELAYED RELEASE ORAL EVERY 8 HOURS SCHEDULED
Qty: 90 TABLET | Refills: 3 | OUTPATIENT
Start: 2019-09-13 | End: 2019-11-26 | Stop reason: SDUPTHER

## 2019-09-13 RX ORDER — LOSARTAN POTASSIUM 25 MG/1
25 TABLET ORAL DAILY
COMMUNITY
End: 2019-11-07 | Stop reason: SDUPTHER

## 2019-09-21 DIAGNOSIS — R56.9 SEIZURES (HCC): ICD-10-CM

## 2019-09-23 RX ORDER — DIVALPROEX SODIUM 500 MG/1
1000 TABLET, DELAYED RELEASE ORAL EVERY 8 HOURS
Qty: 180 TABLET | Refills: 3 | Status: SHIPPED | OUTPATIENT
Start: 2019-09-23 | End: 2020-03-04

## 2019-09-26 ENCOUNTER — OFFICE VISIT (OUTPATIENT)
Dept: HEMATOLOGY | Age: 54
End: 2019-09-26
Payer: MEDICAID

## 2019-09-26 ENCOUNTER — HOSPITAL ENCOUNTER (OUTPATIENT)
Dept: INFUSION THERAPY | Age: 54
Discharge: HOME | End: 2019-09-26
Payer: MEDICAID

## 2019-09-26 VITALS
HEART RATE: 67 BPM | SYSTOLIC BLOOD PRESSURE: 134 MMHG | WEIGHT: 186.8 LBS | BODY MASS INDEX: 28.31 KG/M2 | DIASTOLIC BLOOD PRESSURE: 86 MMHG | OXYGEN SATURATION: 98 % | HEIGHT: 68 IN

## 2019-09-26 DIAGNOSIS — T45.1X5A ANEMIA ASSOCIATED WITH CHEMOTHERAPY: Primary | ICD-10-CM

## 2019-09-26 DIAGNOSIS — R53.83 FATIGUE DUE TO TREATMENT: ICD-10-CM

## 2019-09-26 DIAGNOSIS — T50.905A THROMBOCYTOPENIA DUE TO DRUGS: ICD-10-CM

## 2019-09-26 DIAGNOSIS — C71.9 ASTROCYTOMA (HCC): ICD-10-CM

## 2019-09-26 DIAGNOSIS — D64.81 ANEMIA ASSOCIATED WITH CHEMOTHERAPY: Primary | ICD-10-CM

## 2019-09-26 DIAGNOSIS — T45.1X5A ANEMIA ASSOCIATED WITH CHEMOTHERAPY: ICD-10-CM

## 2019-09-26 DIAGNOSIS — Z74.3 REQUIRES CONTINUOUS SUPERVISION FOR ACTIVITIES OF DAILY LIVING (ADL): ICD-10-CM

## 2019-09-26 DIAGNOSIS — D69.59 THROMBOCYTOPENIA DUE TO DRUGS: ICD-10-CM

## 2019-09-26 DIAGNOSIS — D64.81 ANEMIA ASSOCIATED WITH CHEMOTHERAPY: ICD-10-CM

## 2019-09-26 DIAGNOSIS — C71.9 ASTROCYTOMA (HCC): Primary | ICD-10-CM

## 2019-09-26 PROCEDURE — 85025 COMPLETE CBC W/AUTO DIFF WBC: CPT

## 2019-09-26 PROCEDURE — 99213 OFFICE O/P EST LOW 20 MIN: CPT | Performed by: NURSE PRACTITIONER

## 2019-09-26 PROCEDURE — 80053 COMPREHEN METABOLIC PANEL: CPT

## 2019-09-26 PROCEDURE — 36415 COLL VENOUS BLD VENIPUNCTURE: CPT

## 2019-09-26 RX ORDER — AZITHROMYCIN 250 MG/1
TABLET, FILM COATED ORAL
COMMUNITY
Start: 2019-09-12 | End: 2020-03-13

## 2019-09-26 RX ORDER — METOPROLOL SUCCINATE 25 MG/1
25 TABLET, EXTENDED RELEASE ORAL
COMMUNITY
Start: 2019-09-04 | End: 2020-03-13

## 2019-09-26 RX ORDER — LEVETIRACETAM 250 MG/1
250 TABLET ORAL 2 TIMES DAILY
Refills: 5 | COMMUNITY
Start: 2019-09-03 | End: 2020-09-09 | Stop reason: ALTCHOICE

## 2019-09-26 RX ORDER — PROMETHAZINE HYDROCHLORIDE 12.5 MG/1
TABLET ORAL
Refills: 1 | COMMUNITY
Start: 2019-09-03 | End: 2020-03-13

## 2019-10-01 PROBLEM — Z87.891 FORMER SMOKER: Status: ACTIVE | Noted: 2019-10-01

## 2019-10-01 PROBLEM — Z92.3 HISTORY OF RADIATION THERAPY: Status: ACTIVE | Noted: 2019-10-01

## 2019-10-02 ENCOUNTER — OFFICE VISIT (OUTPATIENT)
Dept: RADIATION ONCOLOGY | Facility: HOSPITAL | Age: 54
End: 2019-10-02

## 2019-10-02 ENCOUNTER — HOSPITAL ENCOUNTER (OUTPATIENT)
Dept: RADIATION ONCOLOGY | Facility: HOSPITAL | Age: 54
Setting detail: RADIATION/ONCOLOGY SERIES
End: 2019-10-02

## 2019-10-02 VITALS
HEIGHT: 68 IN | WEIGHT: 187 LBS | BODY MASS INDEX: 28.34 KG/M2 | SYSTOLIC BLOOD PRESSURE: 137 MMHG | DIASTOLIC BLOOD PRESSURE: 90 MMHG

## 2019-10-02 DIAGNOSIS — C71.9 ASTROCYTOMA BRAIN TUMOR (HCC): Primary | ICD-10-CM

## 2019-10-02 DIAGNOSIS — Z87.891 FORMER SMOKER: ICD-10-CM

## 2019-10-02 DIAGNOSIS — Z92.3 HISTORY OF RADIATION THERAPY: ICD-10-CM

## 2019-10-02 PROCEDURE — G0463 HOSPITAL OUTPT CLINIC VISIT: HCPCS | Performed by: RADIOLOGY

## 2019-10-02 NOTE — PATIENT INSTRUCTIONS
1. Return to Dr. Jessica in 3 months  2. Continue to follow with Dr. Trevino  3. Referral placed to Dr. Garrdio in pain management.

## 2019-10-02 NOTE — PROGRESS NOTES
[facility]  MD Surekha Shirley BSN, PA-C  ___________________________________________________________  [facility]  Department of Radiation Oncology  [address] [place] [id number]  Office: [phone]  Fax: [phone]    DATE:  10/02/2019    PATIENT:   Lukasz Savage 1965                                   MEDICAL RECORD #:  [id number]                                                       REASON FOR FOLLOW UP VISIT:  Astrocytoma brain tumor (CMS/HCC) [C71.9]    Lukasz Savage is a very pleasant 54 y.o. patient that has completed radiation therapy to the brain and returns to the clinic today for inital follow up exam. Reports fatigue, nausea, urgency with urination, dizziness, weakness, light-headedness, and headaches. Uses cane for assistance with ambulation. Denies activity change, appetite change, unexpected weight change, vomiting, diarrhea, seizures, facial asymmetry, and speech difficulty. He continues to follow  and .     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 He completed radiation therapy to the brain on 07/30/2019.     07/03/2017 - CT Head without contrast:  • Bilateral occipital lobe hypodensity compatible with subacute ischemic change. MRI correlation recommended.    07/03/2017 - MRI brain with and without contrast:  • Posterior reversible encephalopathy syndrome.   • Follow-up in 8 weeks is suggested.    07/08/2017 - CT head with and without contrast:  • Negative CT brain without and with contrast    08/29/2017 - MRI brain with and without contrast:  • Interval resolution of posterior cortical and subcortical FLAIR signal.  • No stroke, hemorrhage, or abnormal enhancement.  • No new or increased abnormality    04/02/2019 - MRI Brain with and without contrast:  • There is an approximately 4.5 x 4.5 x 3.5 cm focus of masslike FLAIR  hyperintensity in the posterior right frontal lobe (series 401-image 23 and series 901-image 16).   • There is a second discrete FLAIR hyperintense nodule in the subcortical white matter of the paramedian posterior right frontal lobe immediately above the corpus callosum measuring 1.1 x 1.9 x 1.5 cm (series 401-image 22 and series 901-image 16).   • Notable diffusion signal hyperintensity in this second area with a faint signal hyperintensity in the larger area.   • ADC shows areas of low signal centrally within the larger area of FLAIR abnormality as well as diffusely throughout the smaller nodular hyperintensity (series 204-images 172 and 156).     04/08/2019 - Appointment with :  • Presents from his neurology office after left facial and upper extremity focal motor seizure. MRI was read as abnormal he is here to discuss the MRI results.  In July 2017 he was hospitalized for PRES syndrome. Presented as a seizure at that time and was intubated trached with an extended hospital stay and recovery.  He did very well however and fully recovered.    • In February 2019 he started to develop left facial twitching and left upper extremity twitching.  He has been started on Keppra 500 mg twice daily for just a few days.    • He has not seen any improvement in the focal motor seizures.    • He also relates some difficulty with speech and focus and concentration.    Recommendations:  • We will get him preop and scheduled for a right stereotactic brain biopsy as soon as possible.    04/15/2019 - Craniotomy with biopsy per :  1-3.  Brain, right frontal, biopsy:  • Anaplastic Infiltrating Glioma (see comment)    04/15/2019 - CT Head without contrast:  • Status post right craniotomy with a tract of air extending to the medial right frontal lobe which is presumably related to a biopsy tract.  • Ill-defined hyperdense area in the medial right frontal lobe corresponds with the known mass seen on recent MRI  exam.    05/09/2019 - Appointment with :  • Pathology is consistent with Grade 3 Anaplastic Astrocytoma.   • In my opinion this is not amenable to a maximal safe surgical resection I think any effective surgery that would give a survival benefit would likely severely disabled him.   • I am going to insist that he get a second opinion from the [facility] we will make a referral to Drew Gutierrez.   • We will also make a referral to our radiation oncology and our local oncologist for chemotherapeutic discussions.   • I did extensive discussion with the patient and his family. Their questions and concerns were patiently addressed. I will reach out to the [facility] to discuss the case with Dr. Gutierrez and we will get him set up for radiation oncology and oncology referrals.   •  I will see him in follow-up in about 3 months with a repeat MRI of the brain    05/10/2019 - Presented to ER after falling in the bathroom earlier this morning.  Patient states he slipped and fell while getting out of the bathtub striking his back on the bathtub.  He denies syncope or chest pain before this event.  He states he just slipped and fell. He denies any other injury.     05/10/2019 - CT lumbar spine with and without contrast:  • No CT evidence of acute osseous abnormality.  • Multilevel disc degeneration and spondylosis    05/17/2019 - Consult with :  • The patient and his family verbalize understanding of this discussion, voice no further questions and wish to proceed with recommended therapy of combined modality Temodar and radiation therapy for definitive treatment, anticipate a course of 0772-3836 cGy, final treatment plan to be determined  • We will perform CT simulation on May 20th (Monday) to initiate the treatment planning with the intent to begin treatments as soon as possible.     06/03/2019 - CT Head:  • Evidence of previous right craniotomy for biopsy of 2 right frontal  masses which appear increased in size compared with the previous examinations, concerning for tumor progression.   • No intracranial hemorrhage or midline shift is identified.    06/03/2019 - MRI Brain:  • Increased size of right frontal lobe mass lesions compared to 04/02/2019, which are described in more detail above.    06/12/2019 - 07/30/2019 - Completed Radiation course:  • Received 6000 cGy in 30 fractions to brain via external beam radiation therapy.    06/14/2019 - CT Head:  • Stable CT appearance of the head compared to 06/03/2019, as described above.           06/17/2019 - MRI Brain:  • Approximately 3 cm enhancing partially necrotic tumor immediately above the right corpus callosum body. Associated FLAIR signal is noted immediately above it. Faint enhancement and FLAIR signal extending to the right precentral gyrus, consistent with additional tumor extension.    06/21/2019 - CT Head:  • The hyperdense foci of the right frontal lobe are similar to the 06/14/2019 CT head exam representing the peripherally enhancing centrally necrotic lesion with adjacent lateral enhancement in the right frontal lobe adjacent the corpus callosum on the MRI brain of 06/17/2019. No intracranial hemorrhage identified.    06/29/2019 - CT Head:  • No acute intracranial abnormality, there are ill-defined masses in the right frontal lobe compatible the history of brain neoplasm, similar to the previous head CT. The largest measures about 3.2 cm and shows mild central necrosis.    07/15/2019 - CT Head:  • Stable appearance of the brain compared to the exam from 06/29/2019, with hyperdense masses in the right frontal lobe without associated midline shift. Right craniotomy defect is noted with a hyperdense area just deep to it, also stable from the previous exam. This may represent an additional mass. No acute intracranial findings are appreciated.    08/27/2019 - MRI Brain:  • Interval decreased size and mass effect of the solid  enhancing nodule adjacent to the posterior body/splenium of the corpus callosum. This suggests a positive treatment response. The increasing FLAIR abnormality in this area is attributed to an early radiation induced change.  • The more superficial signal abnormality involving the posterior right frontal lobe is stable or perhaps slightly less prominent on FLAIR.   • No new enhancing nodule in this area to suggest disease progression.    08/27/2019 - Appointment with :  • The patient is recovering well from his difficulties with seizures in July.   •  I do think he has some improvement still to make.    • I did extensive discussion with the radiologist about the MRI findings I think that the overall tumor burden has not increased in fact it may be a little better.    • I think the white matter changes that were seeing her edema/radiation effect.    • We are to put him on a dedicated course of steroids for about 9 days and we are in order outpatient physical therapy for strength gait and balance training.    • We will see him in follow-up in about 3 months with repeat imaging.    09/26/2019 - Appointment with :    History obtained from  PATIENT, FAMILY and CHART    PAST MEDICAL HISTORY   Past Medical History:   Diagnosis Date   • Anemia 6/17/2019   • Angio-edema 7/3/2017   • Anxiety    • Arthritis    • Cancer (CMS/HCC) 05/09/2019    Brain cancer diagnoised yesterday  Dr Trevino    • Clostridium difficile colitis    • Erectile dysfunction 10/6/2016   • GERD (gastroesophageal reflux disease)    • Gout    • HCAP (healthcare-associated pneumonia) 7/11/2017   • Hyperlipidemia    • Malignant hypertension    • MSSA (methicillin susceptible Staphylococcus aureus) infection 7/31/2017   • Obstructive sleep apnea    • S/P shoulder surgery 7/27/2018   • Seizures (CMS/HCC) 7/4/2017      PAST SURGICAL HISTORY   Past Surgical History:   Procedure Laterality Date   • COLONOSCOPY     • CRANIOTOMY Right 4/15/2019     Procedure: CRANIOTOMY WITH BIOPSY RIGHT;  Surgeon: Dillon Trevino MD;  Location: [facility] OR;  Service: Neurosurgery   • SHOULDER ARTHROSCOPY Left    • TRACHEOSTOMY N/A 2017    Procedure: TRACHEOSTOMY WITH TRACHEOSCOPY;  Surgeon: Jairon Pierson MD;  Location: [facility] OR;  Service:       FAMILY HISTORY  family history includes Diabetes in his maternal grandmother and mother; Heart attack in his paternal grandfather; Heart disease in his father; Hypertension in his father and mother; Kidney disease in his sister; No Known Problems in his daughter, maternal grandfather, paternal grandmother, and son.     SOCIAL HISTORY   Social History     Socioeconomic History   • Marital status: Single     Spouse name: Not on file   • Number of children: 2   • Years of education: Not on file   • Highest education level: Not on file   Occupational History   • Occupation: ELECTRIAL WORK   Tobacco Use   • Smoking status: Former Smoker     Packs/day: 0.33     Years: 30.00     Pack years: 9.90     Types: Cigarettes     Last attempt to quit: 7/3/2017     Years since quittin.2   • Smokeless tobacco: Never Used   Substance and Sexual Activity   • Alcohol use: No     Frequency: Never     Comment: used to drink alot but now just ocasional beer since has seizure in 2017   • Drug use: No   • Sexual activity: Defer      ALLERGIES  Lipitor [atorvastatin] and Lisinopril     MEDICATIONS   Current Outpatient Medications   Medication Sig Dispense Refill   • ALPRAZolam (XANAX) 0.25 MG tablet Take 1 tablet by mouth 2 (Two) Times a Day As Needed for Anxiety. 60 tablet 0   • aspirin 81 MG tablet Take 1 tablet by mouth Daily.     • divalproex (DEPAKOTE) 500 MG DR tablet Take 2 tablets by mouth Every 8 (Eight) Hours. 90 tablet 3   • divalproex (DEPAKOTE) 500 MG DR tablet Take 2 tablets by mouth Every 8 (Eight) Hours. 180 tablet 3   • Lacosamide 150 MG tablet Take 150 mg by mouth 2 (Two) Times a Day. 60 tablet 2   • levETIRAcetam (KEPPRA) 1000 MG  tablet Take 1 tablet by mouth 2 (Two) Times a Day. 60 tablet 5   • levETIRAcetam (KEPPRA) 250 MG tablet Take 1 tablet by mouth 2 (Two) Times a Day. 60 tablet 5   • losartan (COZAAR) 25 MG tablet Take 25 mg by mouth Daily.     • metoprolol succinate XL (TOPROL XL) 25 MG 24 hr tablet Take 1 tablet by mouth Daily. 30 tablet 6   • Omega-3 Fatty Acids (FISH OIL) 1000 MG capsule capsule Take 2 capsules by mouth Daily With Breakfast. 180 capsule 1   • omeprazole (prilOSEC) 10 MG capsule TAKE ONE CAPSULE BY MOUTH DAILY FOR ACID REFLUX 90 capsule 1   • oxyCODONE-acetaminophen (PERCOCET) 7.5-325 MG per tablet Take 1 tablet by mouth Every 4 (Four) Hours As Needed for Moderate Pain  or Severe Pain . 40 tablet 0   • promethazine (PHENERGAN) 12.5 MG tablet TAKE 1 TABLET BY MOUTH EVERY 4 (FOUR) HOURS AS NEEDED FOR NAUSEA OR VOMITING. 60 tablet 1   • venlafaxine XR (EFFEXOR-XR) 150 MG 24 hr capsule Take 1 capsule by mouth Daily. 90 capsule 1   • vitamin B-6 (PYRIDOXINE) 100 MG tablet TAKE 1 TABLET BY MOUTH DAILY. 30 tablet 5   • lactulose 20 GM/30ML solution solution Take 30 mL by mouth Daily. (Patient taking differently: Take 20 g by mouth As Needed.) 946 mL 1     No current facility-administered medications for this visit.       The following portions of the patient's history were reviewed and updated as appropriate: allergies, current medications, past family history, past medical history, past social history, past surgical history and problem list.    REVIEW OF SYSTEMS  Review of Systems   Constitutional: Positive for fatigue. Negative for activity change, appetite change, chills, diaphoresis, fever and unexpected weight change.   Eyes: Negative.    Respiratory: Negative.         Sleep apnea  Has CPAP     Cardiovascular: Negative.    Gastrointestinal: Positive for nausea. Negative for abdominal distention, abdominal pain, anal bleeding, blood in stool, constipation, diarrhea, rectal pain and vomiting.   Endocrine: Negative.   "  Genitourinary: Positive for urgency. Negative for difficulty urinating, discharge, dysuria, enuresis, flank pain, frequency, genital sores, hematuria, penile pain, penile swelling, scrotal swelling and testicular pain.        Urgency   Musculoskeletal:        Bilateral knee pain as well as ankles  Back pain  Using a cane to ambulate   Allergic/Immunologic: Negative.    Neurological: Positive for dizziness, weakness, light-headedness and headaches. Negative for tremors, seizures, syncope, facial asymmetry and speech difficulty.        Left arm shakes at times (pt is talking while this is going on)  Has trouble being able to  things with left hand   Hematological: Negative.    Psychiatric/Behavioral: Negative.      PHYSICAL EXAM  VITAL SIGNS:   Vitals:    10/02/19 1441   BP: 137/90   Weight: 84.8 kg (187 lb)   Height: 172.7 cm (68\")   PainSc: 0-No pain     General:  Alert and oriented, no acute distress, well developed, Vitals reviewed.  Head/Neck:  Normocephalic, without obvious abnormality, atraumatic.  The trachea is midline.   Nose/Sinuses:  Nares normal. Septum midline. Mucosa normal. No drainage or sinus tenderness.  Mouth/Throat:  Mucosa moist, no lesions; pharynx without erythema, edema or exudate.  Neck:  supple, no mass, non-tender  Eyes: No gross abnormalities,  no scleral jaundice or erythema.    Ears: Ears appear intact with no abnormalities noted, hearing grossly normal  Chest:  Respiratory efforts are normal and unlabored, chest is clear to auscultation. There are no wheezes, rhonchi, rales.  Cardiovascular: Regular rate and rhythm without murmurs, rubs, or gallops.   Abdomen:  Soft, non-tender, normal bowel sounds; no noted organomegaly or masses. no CVA tenderness   Extremities:  ROME well, warm to touch, no evidence of cyanosis or edema.  Lymphatics:  No evidence of adenopathy in the cervical or supraclavicular areas.  Skin: No suspicious lesions or rashes of concern, skin color, texture, " turgor are normal  Neurologic:  Alert and oriented, gait normal, strength and sensation grossly normal  Psych: Mood and affect are appropriate for circumstance. Eye contact is appropriate.     Clinical Quality Measures  -Pain Documented by Standardized Tool, FPS Lukasz Savage reports a pain score of 0 - (often complaints of bilateral knee, ankles, and back pain). Given his pain assessment as noted, treatment options were discussed and the following options were decided upon as a follow-up plan to address the patient's pain: referral to specialist for assistance in pain treatment guidance. Patient is to return in 3 months for reassessment of his pain.    -Advanced Care Planning Documented  -Body Mass Index Screening and Follow-Up Plan Patient's Body mass index is 28.43 kg/m². BMI is above normal parameters. Recommendations include: educational material.  -Tobacco Use: Screening and Cessation Intervention Social History    Tobacco Use      Smoking status: Former Smoker        Packs/day: 0.33        Years: 30.00        Pack years: 9.9        Types: Cigarettes        Quit date: 7/3/2017        Years since quittin.2      Smokeless tobacco: Never Used     ASSESSMENT AND PLAN   1. Astrocytoma brain tumor (CMS/HCC)    2. History of radiation therapy    3. Former smoker      RECOMMENDATIONS: Lukasz Savage returns to our clinic today for follow up evaluation after completion of radiation on 2019. . 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 He completed radiation therapy to the brain on 2019.    Recent brain MRI was completed on 2019 revealing interval decreased size and mass effect of the solid enhancing nodule adjacent to the posterior body/splenium of the corpus callosum and the more superficial signal abnormality involving the posterior right frontal lobe is stable or perhaps slightly less prominent on FLAIR.     There is no clinical evidence suggestive of  local recurrence of cancer on exam at this time. Mr. Savage has done remarkably well, his seizure medication is being monitored by neurology.     He reports a persistent chronic pain in back, knees and ankles. Will refer to pain mgmt.     Will follow up with him in 3 months or sooner if needed. I have asked them to call clinic for problems between now and next office visit.    Orders Placed This Encounter   Procedures   • Ambulatory Referral to Pain Management     Patient Instructions   1. Return to Dr. Jessica in 3 months  2. Continue to follow with Dr. Trevino  3. Referral placed to Dr. Garrido in pain management.      Todays appointment time was spent in counseling and coordination of care related to patients diagnosis, radiation therapy possible and probable after effects and surveillance according to NCCN Guidelines.  10/02/2019   Oral Jessica III, MD

## 2019-10-11 RX ORDER — LACOSAMIDE 150 MG/1
TABLET, FILM COATED ORAL
Qty: 60 TABLET | Refills: 2 | OUTPATIENT
Start: 2019-10-11 | End: 2020-01-30

## 2019-10-14 DIAGNOSIS — G89.3 CANCER ASSOCIATED PAIN: Primary | ICD-10-CM

## 2019-10-14 RX ORDER — OXYCODONE AND ACETAMINOPHEN 7.5; 325 MG/1; MG/1
TABLET ORAL
Qty: 120 TABLET | Refills: 0 | Status: SHIPPED | OUTPATIENT
Start: 2019-10-14 | End: 2019-11-13

## 2019-10-21 ENCOUNTER — CLINICAL SUPPORT (OUTPATIENT)
Dept: FAMILY MEDICINE CLINIC | Facility: CLINIC | Age: 54
End: 2019-10-21

## 2019-10-21 DIAGNOSIS — Z23 NEEDS FLU SHOT: Primary | ICD-10-CM

## 2019-10-21 PROCEDURE — 90674 CCIIV4 VAC NO PRSV 0.5 ML IM: CPT | Performed by: NURSE PRACTITIONER

## 2019-10-21 PROCEDURE — 90471 IMMUNIZATION ADMIN: CPT | Performed by: NURSE PRACTITIONER

## 2019-10-24 ENCOUNTER — OFFICE VISIT (OUTPATIENT)
Dept: HEMATOLOGY | Age: 54
End: 2019-10-24
Payer: MEDICAID

## 2019-10-24 ENCOUNTER — HOSPITAL ENCOUNTER (OUTPATIENT)
Dept: VASCULAR LAB | Age: 54
Discharge: HOME | End: 2019-10-24
Payer: MEDICAID

## 2019-10-24 ENCOUNTER — HOSPITAL ENCOUNTER (OUTPATIENT)
Dept: INFUSION THERAPY | Age: 54
Discharge: HOME | End: 2019-10-24
Payer: MEDICAID

## 2019-10-24 VITALS
BODY MASS INDEX: 28.14 KG/M2 | DIASTOLIC BLOOD PRESSURE: 100 MMHG | SYSTOLIC BLOOD PRESSURE: 140 MMHG | OXYGEN SATURATION: 96 % | HEART RATE: 76 BPM | WEIGHT: 185.7 LBS | HEIGHT: 68 IN

## 2019-10-24 DIAGNOSIS — T45.1X5A ANEMIA ASSOCIATED WITH CHEMOTHERAPY: ICD-10-CM

## 2019-10-24 DIAGNOSIS — D69.59 THROMBOCYTOPENIA DUE TO DRUGS: ICD-10-CM

## 2019-10-24 DIAGNOSIS — D64.81 ANEMIA ASSOCIATED WITH CHEMOTHERAPY: ICD-10-CM

## 2019-10-24 DIAGNOSIS — G89.3 CANCER ASSOCIATED PAIN: ICD-10-CM

## 2019-10-24 DIAGNOSIS — Z74.3 REQUIRES CONTINUOUS SUPERVISION FOR ACTIVITIES OF DAILY LIVING (ADL): ICD-10-CM

## 2019-10-24 DIAGNOSIS — T50.905A THROMBOCYTOPENIA DUE TO DRUGS: ICD-10-CM

## 2019-10-24 DIAGNOSIS — R53.83 FATIGUE DUE TO TREATMENT: ICD-10-CM

## 2019-10-24 DIAGNOSIS — T50.905A DRUG-INDUCED HEPATIC TOXICITY: ICD-10-CM

## 2019-10-24 DIAGNOSIS — C71.9 ASTROCYTOMA (HCC): ICD-10-CM

## 2019-10-24 DIAGNOSIS — G89.3 CANCER ASSOCIATED PAIN: Primary | ICD-10-CM

## 2019-10-24 DIAGNOSIS — K71.6 DRUG-INDUCED HEPATIC TOXICITY: ICD-10-CM

## 2019-10-24 PROCEDURE — 80053 COMPREHEN METABOLIC PANEL: CPT

## 2019-10-24 PROCEDURE — 85025 COMPLETE CBC W/AUTO DIFF WBC: CPT

## 2019-10-24 PROCEDURE — 93971 EXTREMITY STUDY: CPT

## 2019-10-24 PROCEDURE — 84550 ASSAY OF BLOOD/URIC ACID: CPT

## 2019-10-24 PROCEDURE — 99214 OFFICE O/P EST MOD 30 MIN: CPT | Performed by: NURSE PRACTITIONER

## 2019-10-24 PROCEDURE — 36415 COLL VENOUS BLD VENIPUNCTURE: CPT

## 2019-10-24 RX ORDER — ONDANSETRON 4 MG/1
8 TABLET, FILM COATED ORAL EVERY 8 HOURS PRN
Qty: 30 TABLET | Refills: 2 | Status: SHIPPED | OUTPATIENT
Start: 2019-10-24 | End: 2019-11-11 | Stop reason: SDUPTHER

## 2019-10-29 DIAGNOSIS — I10 ESSENTIAL HYPERTENSION: ICD-10-CM

## 2019-10-29 DIAGNOSIS — C71.9 ASTROCYTOMA BRAIN TUMOR (HCC): Primary | ICD-10-CM

## 2019-10-29 RX ORDER — LOSARTAN POTASSIUM 25 MG/1
TABLET ORAL
Qty: 90 TABLET | Refills: 0 | OUTPATIENT
Start: 2019-10-29

## 2019-11-01 DIAGNOSIS — I10 ESSENTIAL HYPERTENSION: ICD-10-CM

## 2019-11-04 DIAGNOSIS — E78.2 MIXED HYPERLIPIDEMIA: ICD-10-CM

## 2019-11-04 RX ORDER — CHLORAL HYDRATE 500 MG
CAPSULE ORAL
Qty: 180 CAPSULE | Refills: 1 | Status: SHIPPED | OUTPATIENT
Start: 2019-11-04 | End: 2020-05-11

## 2019-11-07 RX ORDER — LOSARTAN POTASSIUM 25 MG/1
25 TABLET ORAL DAILY
Qty: 30 TABLET | Refills: 2 | Status: SHIPPED | OUTPATIENT
Start: 2019-11-07 | End: 2020-01-24 | Stop reason: SDUPTHER

## 2019-11-07 NOTE — TELEPHONE ENCOUNTER
Patient has been started back on the medication due to bp being elevated patient is needing this refilled.

## 2019-11-08 RX ORDER — LOSARTAN POTASSIUM 25 MG/1
TABLET ORAL
Qty: 90 TABLET | Refills: 0 | OUTPATIENT
Start: 2019-11-08

## 2019-11-11 DIAGNOSIS — T45.1X5A ANEMIA ASSOCIATED WITH CHEMOTHERAPY: ICD-10-CM

## 2019-11-11 DIAGNOSIS — D64.81 ANEMIA ASSOCIATED WITH CHEMOTHERAPY: ICD-10-CM

## 2019-11-11 RX ORDER — ONDANSETRON 4 MG/1
8 TABLET, FILM COATED ORAL EVERY 8 HOURS PRN
Qty: 30 TABLET | Refills: 2 | Status: SHIPPED | OUTPATIENT
Start: 2019-11-11 | End: 2020-05-11

## 2019-11-14 DIAGNOSIS — G89.3 CANCER ASSOCIATED PAIN: Primary | ICD-10-CM

## 2019-11-15 RX ORDER — OXYCODONE AND ACETAMINOPHEN 7.5; 325 MG/1; MG/1
TABLET ORAL
Qty: 120 TABLET | Refills: 0 | Status: SHIPPED | OUTPATIENT
Start: 2019-11-15 | End: 2019-12-15

## 2019-11-21 ENCOUNTER — OFFICE VISIT (OUTPATIENT)
Dept: HEMATOLOGY | Age: 54
End: 2019-11-21
Payer: MEDICAID

## 2019-11-21 ENCOUNTER — HOSPITAL ENCOUNTER (OUTPATIENT)
Dept: INFUSION THERAPY | Age: 54
Discharge: HOME | End: 2019-11-21
Payer: MEDICAID

## 2019-11-21 VITALS
BODY MASS INDEX: 29.27 KG/M2 | SYSTOLIC BLOOD PRESSURE: 130 MMHG | HEART RATE: 79 BPM | HEIGHT: 68 IN | WEIGHT: 193.1 LBS | DIASTOLIC BLOOD PRESSURE: 80 MMHG | OXYGEN SATURATION: 97 %

## 2019-11-21 DIAGNOSIS — M25.562 CHRONIC PAIN OF BOTH KNEES: ICD-10-CM

## 2019-11-21 DIAGNOSIS — D64.81 ANEMIA ASSOCIATED WITH CHEMOTHERAPY: Primary | ICD-10-CM

## 2019-11-21 DIAGNOSIS — C71.9 ASTROCYTOMA (HCC): ICD-10-CM

## 2019-11-21 DIAGNOSIS — G89.29 CHRONIC PAIN OF BOTH KNEES: ICD-10-CM

## 2019-11-21 DIAGNOSIS — M25.561 CHRONIC PAIN OF BOTH KNEES: ICD-10-CM

## 2019-11-21 DIAGNOSIS — R53.83 FATIGUE DUE TO TREATMENT: ICD-10-CM

## 2019-11-21 DIAGNOSIS — T45.1X5A ANEMIA ASSOCIATED WITH CHEMOTHERAPY: Primary | ICD-10-CM

## 2019-11-21 DIAGNOSIS — Z74.3 REQUIRES CONTINUOUS SUPERVISION FOR ACTIVITIES OF DAILY LIVING (ADL): ICD-10-CM

## 2019-11-21 PROBLEM — G89.3 CANCER ASSOCIATED PAIN: Status: ACTIVE | Noted: 2019-11-21

## 2019-11-21 PROCEDURE — 99211 OFF/OP EST MAY X REQ PHY/QHP: CPT

## 2019-11-21 PROCEDURE — 99213 OFFICE O/P EST LOW 20 MIN: CPT | Performed by: NURSE PRACTITIONER

## 2019-11-21 PROCEDURE — 80053 COMPREHEN METABOLIC PANEL: CPT

## 2019-11-21 PROCEDURE — 85025 COMPLETE CBC W/AUTO DIFF WBC: CPT

## 2019-11-21 PROCEDURE — 36415 COLL VENOUS BLD VENIPUNCTURE: CPT

## 2019-11-21 ASSESSMENT — ENCOUNTER SYMPTOMS
BACK PAIN: 1
VOMITING: 0
ABDOMINAL PAIN: 0
WHEEZING: 0
NAUSEA: 1
BLOOD IN STOOL: 0
EYE REDNESS: 0
CONSTIPATION: 0
SHORTNESS OF BREATH: 0
EYES NEGATIVE: 1
EYE DISCHARGE: 0
SORE THROAT: 0
RESPIRATORY NEGATIVE: 1
COUGH: 0
DIARRHEA: 0
EYE PAIN: 0

## 2019-11-26 ENCOUNTER — OFFICE VISIT (OUTPATIENT)
Dept: NEUROSURGERY | Facility: CLINIC | Age: 54
End: 2019-11-26

## 2019-11-26 ENCOUNTER — HOSPITAL ENCOUNTER (OUTPATIENT)
Dept: MRI IMAGING | Facility: HOSPITAL | Age: 54
Discharge: HOME | End: 2019-11-26
Admitting: NEUROLOGICAL SURGERY

## 2019-11-26 VITALS
DIASTOLIC BLOOD PRESSURE: 76 MMHG | BODY MASS INDEX: 28.34 KG/M2 | HEIGHT: 68 IN | SYSTOLIC BLOOD PRESSURE: 138 MMHG | WEIGHT: 187 LBS

## 2019-11-26 DIAGNOSIS — C71.9 ASTROCYTOMA BRAIN TUMOR (HCC): ICD-10-CM

## 2019-11-26 DIAGNOSIS — R41.82 ALTERED MENTAL STATUS, UNSPECIFIED ALTERED MENTAL STATUS TYPE: ICD-10-CM

## 2019-11-26 DIAGNOSIS — Z78.9 NON-SMOKER: ICD-10-CM

## 2019-11-26 DIAGNOSIS — C71.9 ASTROCYTOMA BRAIN TUMOR (HCC): Primary | ICD-10-CM

## 2019-11-26 LAB — CREAT BLDA-MCNC: 1.2 MG/DL (ref 0.6–1.3)

## 2019-11-26 PROCEDURE — 0 GADOBENATE DIMEGLUMINE 529 MG/ML SOLUTION: Performed by: NEUROLOGICAL SURGERY

## 2019-11-26 PROCEDURE — A9577 INJ MULTIHANCE: HCPCS | Performed by: NEUROLOGICAL SURGERY

## 2019-11-26 PROCEDURE — 70553 MRI BRAIN STEM W/O & W/DYE: CPT

## 2019-11-26 PROCEDURE — 82565 ASSAY OF CREATININE: CPT

## 2019-11-26 PROCEDURE — 99213 OFFICE O/P EST LOW 20 MIN: CPT | Performed by: NEUROLOGICAL SURGERY

## 2019-11-26 RX ORDER — ONDANSETRON 4 MG/1
8 TABLET, FILM COATED ORAL EVERY 8 HOURS PRN
Refills: 2 | COMMUNITY
Start: 2019-11-11

## 2019-11-26 RX ORDER — ALLOPURINOL 300 MG/1
300 TABLET ORAL AS NEEDED
Refills: 0 | Status: ON HOLD | COMMUNITY
Start: 2019-09-21 | End: 2020-01-01

## 2019-11-26 RX ADMIN — GADOBENATE DIMEGLUMINE 20 ML: 529 INJECTION, SOLUTION INTRAVENOUS at 13:30

## 2019-11-26 NOTE — PROGRESS NOTES
SUBJECTIVE:  Patient ID: Lukasz Savage is a 54 y.o. male is here today for follow-up.    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  He is on Temodar.  He has underwent IMRT radiation treatments.  He has had several issues with seizures since his diagnosis.  He has not had a seizure since July 2019.  He is on Vimpat Depakote and Keppra for seizure control.  The last time we saw him we did send him an order for a dedicated course of physical therapy but due to some logistical issues he was not able to start that therapy.    The following portions of the patient's history were reviewed and updated as appropriate: allergies, current medications, past family history, past medical history, past social history, past surgical history and problem list.    OBJECTIVE:    Review of Systems   Neurological: Positive for seizures.   All other systems reviewed and are negative.         Physical Exam   Constitutional: He is oriented to person, place, and time.   Neurological: He is oriented to person, place, and time.   Psychiatric: His speech is normal.       Neurologic Exam     Mental Status   Oriented to person, place, and time.   Attention: normal.   Speech: speech is normal   Level of consciousness: alert  Knowledge: good.     Cranial Nerves   Cranial nerves II through XII intact.     Motor Exam   Muscle bulk: normal  Overall muscle tone: normal  Right arm pronator drift: absent  Left arm pronator drift: absentLeft upper extremity weakness distal greater than proximal.  Some ataxia with the left upper and lower extremity also     Sensory Exam   Light touch normal.   Pinprick normal.     Gait, Coordination, and Reflexes     Gait  Gait:  (Mildly unsteady gait but walks without assistance)    Reflexes   Reflexes 2+ except as noted. Left upper extremity dysmetria and dysdiadochokinesia       Independent Review of Radiographic Studies:   MRI of the brain with and without contrast shows improvement in the white matter changes of the right frontal lobe.  With the enhancing portion of the deep frontal lobe appears to be unchanged.    ASSESSMENT/PLAN:  The patient seems to be tolerating his treatments well.  His MRI is stable as far as the enhancing portion and improved to start the white matter changes.  We will see him in follow-up in about 3 months      1. Astrocytoma brain tumor (CMS/HCC)    2. Non-smoker            No Follow-up on file.      Dillon Trevino MD

## 2019-11-27 RX ORDER — PROMETHAZINE HYDROCHLORIDE 12.5 MG/1
12.5 TABLET ORAL EVERY 4 HOURS PRN
Qty: 60 TABLET | Refills: 1 | Status: SHIPPED | OUTPATIENT
Start: 2019-11-27 | End: 2019-12-05 | Stop reason: SDUPTHER

## 2019-12-03 ENCOUNTER — OFFICE VISIT (OUTPATIENT)
Dept: FAMILY MEDICINE CLINIC | Facility: CLINIC | Age: 54
End: 2019-12-03

## 2019-12-03 VITALS
SYSTOLIC BLOOD PRESSURE: 162 MMHG | HEART RATE: 77 BPM | DIASTOLIC BLOOD PRESSURE: 88 MMHG | HEIGHT: 68 IN | OXYGEN SATURATION: 98 % | RESPIRATION RATE: 18 BRPM | TEMPERATURE: 97.9 F | WEIGHT: 192.8 LBS | BODY MASS INDEX: 29.22 KG/M2

## 2019-12-03 DIAGNOSIS — G89.29 CHRONIC PAIN OF LEFT KNEE: Primary | ICD-10-CM

## 2019-12-03 DIAGNOSIS — M25.562 CHRONIC PAIN OF LEFT KNEE: Primary | ICD-10-CM

## 2019-12-03 PROCEDURE — 99213 OFFICE O/P EST LOW 20 MIN: CPT | Performed by: FAMILY MEDICINE

## 2019-12-03 NOTE — PROGRESS NOTES
Subjective cc: left knee pain   Lukasz Savage is a 54 y.o. male with presents with complaint of left knee pain. Pt was referred to [facility] but was told he had been dismissed in 2018. They are requesting referral somewhere else. Pt currently has pain medication that he can take for the knee pain but he wants to be off of it because it is interfering with some of his cancer treatments. He is interested in having injections. The left is the worse but now his right is starting to hurt as well because of hte way he has been walking.      He is having nausea currently due to recent chemo treatment.     Knee Pain    The incident occurred more than 1 week ago. The incident occurred at home. There was no injury mechanism. The pain is present in the left knee and right knee. The quality of the pain is described as stabbing. The pain is moderate. The pain has been worsening since onset. Associated symptoms include muscle weakness. Pertinent negatives include no inability to bear weight, loss of motion, numbness or tingling. He reports no foreign bodies present. The symptoms are aggravated by palpation and movement. He has tried rest for the symptoms. The treatment provided no relief.        The following portions of the patient's history were reviewed and updated as appropriate: allergies, current medications, past family history, past medical history, past social history, past surgical history and problem list.        Review of Systems   Constitutional: Positive for fatigue. Negative for activity change, appetite change and fever.   Gastrointestinal: Positive for nausea and vomiting.   Musculoskeletal: Positive for arthralgias, gait problem and joint swelling.   Skin: Negative for color change, rash and wound.   Neurological: Positive for seizures and weakness. Negative for tingling and numbness.       Objective   Blood pressure 162/88, pulse 77, temperature 97.9 °F (36.6 °C), temperature source Oral, resp. rate  "18, height 172.7 cm (68\"), weight 87.5 kg (192 lb 12.8 oz), SpO2 98 %.  Physical Exam   Constitutional: He appears well-developed and well-nourished. No distress.   Cardiovascular: Normal rate and intact distal pulses.   Pulmonary/Chest: Effort normal. No respiratory distress.   Musculoskeletal: He exhibits tenderness.        Right knee: He exhibits decreased range of motion. He exhibits no swelling and no bony tenderness. Tenderness found. No medial joint line and no lateral joint line tenderness noted.        Left knee: He exhibits decreased range of motion. He exhibits no swelling, no erythema and no bony tenderness. Tenderness found. No medial joint line and no lateral joint line tenderness noted.        Legs:  Neurological: He is alert.   Skin: Skin is warm and dry. No rash noted. He is not diaphoretic. No erythema.   Psychiatric: He has a normal mood and affect. His behavior is normal. Judgment and thought content normal.   Nursing note and vitals reviewed.      Assessment/Plan   Problems Addressed this Visit     None      Visit Diagnoses     Chronic pain of left knee    -  Primary    Relevant Orders    Ambulatory Referral to Orthopedic Surgery        PLAN:     #1 knee pain: new, referral to ortho to consider injections so he does not have to use pain medication           This document has been electronically signed by Malia Vargas MD on December 3, 2019 3:23 PM             "

## 2019-12-05 RX ORDER — PROMETHAZINE HYDROCHLORIDE 12.5 MG/1
12.5 TABLET ORAL EVERY 4 HOURS PRN
Qty: 60 TABLET | Refills: 1 | Status: SHIPPED | OUTPATIENT
Start: 2019-12-05 | End: 2020-05-08

## 2019-12-10 DIAGNOSIS — F33.1 MODERATE EPISODE OF RECURRENT MAJOR DEPRESSIVE DISORDER (HCC): ICD-10-CM

## 2019-12-10 DIAGNOSIS — K21.9 GASTROESOPHAGEAL REFLUX DISEASE WITHOUT ESOPHAGITIS: ICD-10-CM

## 2019-12-10 RX ORDER — OMEPRAZOLE 10 MG/1
CAPSULE, DELAYED RELEASE ORAL
Qty: 90 CAPSULE | Refills: 1 | Status: SHIPPED | OUTPATIENT
Start: 2019-12-10 | End: 2020-09-16

## 2019-12-10 RX ORDER — VENLAFAXINE HYDROCHLORIDE 150 MG/1
CAPSULE, EXTENDED RELEASE ORAL
Qty: 90 CAPSULE | Refills: 1 | Status: SHIPPED | OUTPATIENT
Start: 2019-12-10 | End: 2020-06-12

## 2019-12-17 ENCOUNTER — HOSPITAL ENCOUNTER (OUTPATIENT)
Dept: INFUSION THERAPY | Age: 54
Discharge: HOME | End: 2019-12-17
Payer: MEDICAID

## 2019-12-17 ENCOUNTER — OFFICE VISIT (OUTPATIENT)
Dept: HEMATOLOGY | Age: 54
End: 2019-12-17
Payer: MEDICAID

## 2019-12-17 VITALS
HEART RATE: 90 BPM | SYSTOLIC BLOOD PRESSURE: 148 MMHG | OXYGEN SATURATION: 97 % | HEIGHT: 68 IN | WEIGHT: 190.9 LBS | DIASTOLIC BLOOD PRESSURE: 80 MMHG | BODY MASS INDEX: 28.93 KG/M2

## 2019-12-17 DIAGNOSIS — C71.9 ASTROCYTOMA (HCC): Primary | ICD-10-CM

## 2019-12-17 DIAGNOSIS — M25.562 CHRONIC PAIN OF BOTH KNEES: ICD-10-CM

## 2019-12-17 DIAGNOSIS — D64.81 ANEMIA ASSOCIATED WITH CHEMOTHERAPY: ICD-10-CM

## 2019-12-17 DIAGNOSIS — Z74.3 REQUIRES CONTINUOUS SUPERVISION FOR ACTIVITIES OF DAILY LIVING (ADL): ICD-10-CM

## 2019-12-17 DIAGNOSIS — C71.9 ASTROCYTOMA (HCC): ICD-10-CM

## 2019-12-17 DIAGNOSIS — R53.83 FATIGUE DUE TO TREATMENT: ICD-10-CM

## 2019-12-17 DIAGNOSIS — T45.1X5A ANEMIA ASSOCIATED WITH CHEMOTHERAPY: ICD-10-CM

## 2019-12-17 DIAGNOSIS — G89.29 CHRONIC PAIN OF BOTH KNEES: ICD-10-CM

## 2019-12-17 DIAGNOSIS — M25.561 CHRONIC PAIN OF BOTH KNEES: ICD-10-CM

## 2019-12-17 PROCEDURE — 99211 OFF/OP EST MAY X REQ PHY/QHP: CPT

## 2019-12-17 PROCEDURE — 80053 COMPREHEN METABOLIC PANEL: CPT

## 2019-12-17 PROCEDURE — 36415 COLL VENOUS BLD VENIPUNCTURE: CPT

## 2019-12-17 PROCEDURE — 99213 OFFICE O/P EST LOW 20 MIN: CPT | Performed by: NURSE PRACTITIONER

## 2019-12-17 PROCEDURE — 85025 COMPLETE CBC W/AUTO DIFF WBC: CPT

## 2019-12-17 RX ORDER — LACTULOSE 20 G/30ML
SOLUTION ORAL PRN
COMMUNITY

## 2019-12-17 RX ORDER — OXYCODONE AND ACETAMINOPHEN 7.5; 325 MG/1; MG/1
1 TABLET ORAL EVERY 4 HOURS PRN
Qty: 120 TABLET | Refills: 0 | Status: SHIPPED | OUTPATIENT
Start: 2019-12-17 | End: 2020-01-16

## 2019-12-17 ASSESSMENT — ENCOUNTER SYMPTOMS
DIARRHEA: 0
BLOOD IN STOOL: 0
VOMITING: 0
EYES NEGATIVE: 1
EYE DISCHARGE: 0
BACK PAIN: 1
SORE THROAT: 0
EYE PAIN: 0
WHEEZING: 0
RESPIRATORY NEGATIVE: 1
EYE REDNESS: 0
ABDOMINAL PAIN: 0
CONSTIPATION: 0
COUGH: 0
SHORTNESS OF BREATH: 0

## 2019-12-30 RX ORDER — LEVETIRACETAM 1000 MG/1
1000 TABLET ORAL 2 TIMES DAILY
Qty: 60 TABLET | Refills: 5 | Status: SHIPPED | OUTPATIENT
Start: 2019-12-30 | End: 2020-06-24

## 2019-12-30 RX ORDER — LEVETIRACETAM 250 MG/1
250 TABLET ORAL 2 TIMES DAILY
Qty: 60 TABLET | Refills: 5 | Status: SHIPPED | OUTPATIENT
Start: 2019-12-30 | End: 2020-06-24

## 2020-01-01 ENCOUNTER — OFFICE VISIT (OUTPATIENT)
Dept: NEUROSURGERY | Facility: CLINIC | Age: 55
End: 2020-01-01

## 2020-01-01 ENCOUNTER — HOSPITAL ENCOUNTER (INPATIENT)
Facility: HOSPITAL | Age: 55
LOS: 3 days | Discharge: NURSING, REHAB OR LONG-TERM CARE | End: 2020-12-31
Attending: EMERGENCY MEDICINE | Admitting: INTERNAL MEDICINE

## 2020-01-01 ENCOUNTER — OFFICE VISIT (OUTPATIENT)
Dept: FAMILY MEDICINE CLINIC | Facility: CLINIC | Age: 55
End: 2020-01-01

## 2020-01-01 ENCOUNTER — APPOINTMENT (OUTPATIENT)
Dept: CT IMAGING | Facility: HOSPITAL | Age: 55
End: 2020-01-01

## 2020-01-01 ENCOUNTER — TELEPHONE (OUTPATIENT)
Dept: FAMILY MEDICINE CLINIC | Facility: CLINIC | Age: 55
End: 2020-01-01

## 2020-01-01 ENCOUNTER — TELEPHONE (OUTPATIENT)
Dept: NEUROSURGERY | Facility: CLINIC | Age: 55
End: 2020-01-01

## 2020-01-01 ENCOUNTER — APPOINTMENT (OUTPATIENT)
Dept: GENERAL RADIOLOGY | Facility: HOSPITAL | Age: 55
End: 2020-01-01

## 2020-01-01 ENCOUNTER — HOSPITAL ENCOUNTER (OUTPATIENT)
Dept: MRI IMAGING | Facility: HOSPITAL | Age: 55
Discharge: HOME | End: 2020-12-07
Admitting: NEUROLOGICAL SURGERY

## 2020-01-01 VITALS
HEART RATE: 99 BPM | WEIGHT: 197.4 LBS | BODY MASS INDEX: 29.92 KG/M2 | HEIGHT: 68 IN | RESPIRATION RATE: 18 BRPM | TEMPERATURE: 97.8 F | SYSTOLIC BLOOD PRESSURE: 159 MMHG | DIASTOLIC BLOOD PRESSURE: 109 MMHG | OXYGEN SATURATION: 98 %

## 2020-01-01 VITALS
SYSTOLIC BLOOD PRESSURE: 127 MMHG | HEART RATE: 77 BPM | HEIGHT: 70 IN | BODY MASS INDEX: 25.87 KG/M2 | DIASTOLIC BLOOD PRESSURE: 77 MMHG | WEIGHT: 180.7 LBS | OXYGEN SATURATION: 95 % | RESPIRATION RATE: 16 BRPM | TEMPERATURE: 98.2 F

## 2020-01-01 VITALS
WEIGHT: 200 LBS | DIASTOLIC BLOOD PRESSURE: 78 MMHG | SYSTOLIC BLOOD PRESSURE: 122 MMHG | HEIGHT: 68 IN | BODY MASS INDEX: 30.31 KG/M2

## 2020-01-01 DIAGNOSIS — N40.1 BPH ASSOCIATED WITH NOCTURIA: Primary | ICD-10-CM

## 2020-01-01 DIAGNOSIS — Z23 FLU VACCINE NEED: ICD-10-CM

## 2020-01-01 DIAGNOSIS — K59.00 CONSTIPATION, UNSPECIFIED CONSTIPATION TYPE: ICD-10-CM

## 2020-01-01 DIAGNOSIS — R56.9 SEIZURES (HCC): ICD-10-CM

## 2020-01-01 DIAGNOSIS — I10 ESSENTIAL HYPERTENSION: ICD-10-CM

## 2020-01-01 DIAGNOSIS — C71.9 ASTROCYTOMA BRAIN TUMOR (HCC): ICD-10-CM

## 2020-01-01 DIAGNOSIS — R13.12 OROPHARYNGEAL DYSPHAGIA: ICD-10-CM

## 2020-01-01 DIAGNOSIS — C71.9 ASTROCYTOMA (HCC): ICD-10-CM

## 2020-01-01 DIAGNOSIS — C71.9 ASTROCYTOMA BRAIN TUMOR (HCC): Primary | ICD-10-CM

## 2020-01-01 DIAGNOSIS — R56.9 SEIZURES (HCC): Primary | ICD-10-CM

## 2020-01-01 DIAGNOSIS — R53.1 GENERALIZED WEAKNESS: ICD-10-CM

## 2020-01-01 DIAGNOSIS — Z78.9 NON-SMOKER: ICD-10-CM

## 2020-01-01 DIAGNOSIS — I10 ESSENTIAL HYPERTENSION: Primary | ICD-10-CM

## 2020-01-01 DIAGNOSIS — Z74.09 IMPAIRED MOBILITY: ICD-10-CM

## 2020-01-01 DIAGNOSIS — K42.9 UMBILICAL HERNIA WITHOUT OBSTRUCTION AND WITHOUT GANGRENE: ICD-10-CM

## 2020-01-01 DIAGNOSIS — Z74.09 IMPAIRED MOBILITY AND ADLS: ICD-10-CM

## 2020-01-01 DIAGNOSIS — Z74.1 REQUIRES ASSISTANCE WITH ACTIVITIES OF DAILY LIVING (ADL): Primary | ICD-10-CM

## 2020-01-01 DIAGNOSIS — Z78.9 IMPAIRED MOBILITY AND ADLS: ICD-10-CM

## 2020-01-01 DIAGNOSIS — R23.3 EASY BRUISABILITY: Primary | ICD-10-CM

## 2020-01-01 DIAGNOSIS — E87.6 HYPOKALEMIA: ICD-10-CM

## 2020-01-01 DIAGNOSIS — C71.9 ANAPLASTIC GLIOMA OF BRAIN (HCC): Chronic | ICD-10-CM

## 2020-01-01 DIAGNOSIS — R35.1 BPH ASSOCIATED WITH NOCTURIA: Primary | ICD-10-CM

## 2020-01-01 LAB
ALBUMIN SERPL-MCNC: 3.3 G/DL (ref 3.5–5.2)
ALBUMIN/GLOB SERPL: 1.3 G/DL
ALP SERPL-CCNC: 56 U/L (ref 39–117)
ALT SERPL W P-5'-P-CCNC: 23 U/L (ref 1–41)
ANION GAP SERPL CALCULATED.3IONS-SCNC: 7 MMOL/L (ref 5–15)
ANION GAP SERPL CALCULATED.3IONS-SCNC: 7 MMOL/L (ref 5–15)
ANION GAP SERPL CALCULATED.3IONS-SCNC: 9 MMOL/L (ref 5–15)
AST SERPL-CCNC: 30 U/L (ref 1–40)
BASOPHILS # BLD AUTO: 0.03 10*3/MM3 (ref 0–0.2)
BASOPHILS NFR BLD AUTO: 0.5 % (ref 0–1.5)
BILIRUB SERPL-MCNC: 0.2 MG/DL (ref 0–1.2)
BILIRUB UR QL STRIP: ABNORMAL
BUN SERPL-MCNC: 12 MG/DL (ref 6–20)
BUN SERPL-MCNC: 14 MG/DL (ref 6–20)
BUN SERPL-MCNC: 16 MG/DL (ref 6–20)
BUN/CREAT SERPL: 15.6 (ref 7–25)
BUN/CREAT SERPL: 17.5 (ref 7–25)
BUN/CREAT SERPL: 18.8 (ref 7–25)
CALCIUM SPEC-SCNC: 8.8 MG/DL (ref 8.6–10.5)
CALCIUM SPEC-SCNC: 8.9 MG/DL (ref 8.6–10.5)
CALCIUM SPEC-SCNC: 9.2 MG/DL (ref 8.6–10.5)
CHLORIDE SERPL-SCNC: 106 MMOL/L (ref 98–107)
CHLORIDE SERPL-SCNC: 107 MMOL/L (ref 98–107)
CHLORIDE SERPL-SCNC: 108 MMOL/L (ref 98–107)
CLARITY UR: CLEAR
CO2 SERPL-SCNC: 27 MMOL/L (ref 22–29)
CO2 SERPL-SCNC: 29 MMOL/L (ref 22–29)
CO2 SERPL-SCNC: 29 MMOL/L (ref 22–29)
COLOR UR: ABNORMAL
CREAT BLDA-MCNC: 1 MG/DL (ref 0.6–1.3)
CREAT SERPL-MCNC: 0.77 MG/DL (ref 0.76–1.27)
CREAT SERPL-MCNC: 0.8 MG/DL (ref 0.76–1.27)
CREAT SERPL-MCNC: 0.85 MG/DL (ref 0.76–1.27)
DEPRECATED RDW RBC AUTO: 43.3 FL (ref 37–54)
DEPRECATED RDW RBC AUTO: 45.1 FL (ref 37–54)
EOSINOPHIL # BLD AUTO: 0.18 10*3/MM3 (ref 0–0.4)
EOSINOPHIL NFR BLD AUTO: 3.2 % (ref 0.3–6.2)
ERYTHROCYTE [DISTWIDTH] IN BLOOD BY AUTOMATED COUNT: 13.2 % (ref 12.3–15.4)
ERYTHROCYTE [DISTWIDTH] IN BLOOD BY AUTOMATED COUNT: 13.2 % (ref 12.3–15.4)
GFR SERPL CREATININE-BSD FRML MDRD: 101 ML/MIN/1.73
GFR SERPL CREATININE-BSD FRML MDRD: 105 ML/MIN/1.73
GFR SERPL CREATININE-BSD FRML MDRD: 94 ML/MIN/1.73
GLOBULIN UR ELPH-MCNC: 2.6 GM/DL
GLUCOSE BLDC GLUCOMTR-MCNC: 89 MG/DL (ref 70–130)
GLUCOSE SERPL-MCNC: 106 MG/DL (ref 65–99)
GLUCOSE SERPL-MCNC: 85 MG/DL (ref 65–99)
GLUCOSE SERPL-MCNC: 98 MG/DL (ref 65–99)
GLUCOSE UR STRIP-MCNC: NEGATIVE MG/DL
HCT VFR BLD AUTO: 34.8 % (ref 37.5–51)
HCT VFR BLD AUTO: 39.4 % (ref 37.5–51)
HGB BLD-MCNC: 12.6 G/DL (ref 13–17.7)
HGB BLD-MCNC: 13.9 G/DL (ref 13–17.7)
HGB UR QL STRIP.AUTO: NEGATIVE
HOLD SPECIMEN: NORMAL
HOLD SPECIMEN: NORMAL
IMM GRANULOCYTES # BLD AUTO: 0.04 10*3/MM3 (ref 0–0.05)
IMM GRANULOCYTES NFR BLD AUTO: 0.7 % (ref 0–0.5)
KETONES UR QL STRIP: ABNORMAL
LEUKOCYTE ESTERASE UR QL STRIP.AUTO: NEGATIVE
LYMPHOCYTES # BLD AUTO: 1.67 10*3/MM3 (ref 0.7–3.1)
LYMPHOCYTES NFR BLD AUTO: 29.8 % (ref 19.6–45.3)
MCH RBC QN AUTO: 32.1 PG (ref 26.6–33)
MCH RBC QN AUTO: 32.4 PG (ref 26.6–33)
MCHC RBC AUTO-ENTMCNC: 35.3 G/DL (ref 31.5–35.7)
MCHC RBC AUTO-ENTMCNC: 36.2 G/DL (ref 31.5–35.7)
MCV RBC AUTO: 89.5 FL (ref 79–97)
MCV RBC AUTO: 91 FL (ref 79–97)
MONOCYTES # BLD AUTO: 0.91 10*3/MM3 (ref 0.1–0.9)
MONOCYTES NFR BLD AUTO: 16.2 % (ref 5–12)
NEUTROPHILS NFR BLD AUTO: 2.78 10*3/MM3 (ref 1.7–7)
NEUTROPHILS NFR BLD AUTO: 49.6 % (ref 42.7–76)
NITRITE UR QL STRIP: NEGATIVE
NRBC BLD AUTO-RTO: 0 /100 WBC (ref 0–0.2)
PH UR STRIP.AUTO: 6 [PH] (ref 5–8)
PLATELET # BLD AUTO: 160 10*3/MM3 (ref 140–450)
PLATELET # BLD AUTO: 164 10*3/MM3 (ref 140–450)
PMV BLD AUTO: 10.5 FL (ref 6–12)
PMV BLD AUTO: 9.8 FL (ref 6–12)
POTASSIUM SERPL-SCNC: 3.4 MMOL/L (ref 3.5–5.2)
POTASSIUM SERPL-SCNC: 3.7 MMOL/L (ref 3.5–5.2)
POTASSIUM SERPL-SCNC: 3.8 MMOL/L (ref 3.5–5.2)
PROT SERPL-MCNC: 5.9 G/DL (ref 6–8.5)
PROT UR QL STRIP: NEGATIVE
RBC # BLD AUTO: 3.89 10*6/MM3 (ref 4.14–5.8)
RBC # BLD AUTO: 4.33 10*6/MM3 (ref 4.14–5.8)
SARS-COV-2 RNA PNL SPEC NAA+PROBE: NOT DETECTED
SODIUM SERPL-SCNC: 142 MMOL/L (ref 136–145)
SODIUM SERPL-SCNC: 142 MMOL/L (ref 136–145)
SODIUM SERPL-SCNC: 145 MMOL/L (ref 136–145)
SP GR UR STRIP: >1.03 (ref 1–1.03)
UROBILINOGEN UR QL STRIP: ABNORMAL
VALPROATE SERPL-MCNC: 92.2 MCG/ML (ref 50–125)
WBC # BLD AUTO: 5.61 10*3/MM3 (ref 3.4–10.8)
WBC # BLD AUTO: 7.4 10*3/MM3 (ref 3.4–10.8)
WHOLE BLOOD HOLD SPECIMEN: NORMAL

## 2020-01-01 PROCEDURE — A9577 INJ MULTIHANCE: HCPCS | Performed by: NEUROLOGICAL SURGERY

## 2020-01-01 PROCEDURE — 82962 GLUCOSE BLOOD TEST: CPT

## 2020-01-01 PROCEDURE — 80164 ASSAY DIPROPYLACETIC ACD TOT: CPT | Performed by: EMERGENCY MEDICINE

## 2020-01-01 PROCEDURE — 87635 SARS-COV-2 COVID-19 AMP PRB: CPT | Performed by: EMERGENCY MEDICINE

## 2020-01-01 PROCEDURE — 36415 COLL VENOUS BLD VENIPUNCTURE: CPT

## 2020-01-01 PROCEDURE — 97530 THERAPEUTIC ACTIVITIES: CPT

## 2020-01-01 PROCEDURE — 92526 ORAL FUNCTION THERAPY: CPT | Performed by: SPEECH-LANGUAGE PATHOLOGIST

## 2020-01-01 PROCEDURE — 85025 COMPLETE CBC W/AUTO DIFF WBC: CPT | Performed by: EMERGENCY MEDICINE

## 2020-01-01 PROCEDURE — 25010000002 DEXAMETHASONE PER 1 MG: Performed by: EMERGENCY MEDICINE

## 2020-01-01 PROCEDURE — 97163 PT EVAL HIGH COMPLEX 45 MIN: CPT | Performed by: PHYSICAL THERAPIST

## 2020-01-01 PROCEDURE — 97535 SELF CARE MNGMENT TRAINING: CPT

## 2020-01-01 PROCEDURE — 97166 OT EVAL MOD COMPLEX 45 MIN: CPT | Performed by: OCCUPATIONAL THERAPIST

## 2020-01-01 PROCEDURE — 97530 THERAPEUTIC ACTIVITIES: CPT | Performed by: PHYSICAL THERAPIST

## 2020-01-01 PROCEDURE — 25010000002 IOPAMIDOL 61 % SOLUTION: Performed by: EMERGENCY MEDICINE

## 2020-01-01 PROCEDURE — 70553 MRI BRAIN STEM W/O & W/DYE: CPT

## 2020-01-01 PROCEDURE — 80048 BASIC METABOLIC PNL TOTAL CA: CPT | Performed by: NURSE PRACTITIONER

## 2020-01-01 PROCEDURE — 82565 ASSAY OF CREATININE: CPT

## 2020-01-01 PROCEDURE — 85027 COMPLETE CBC AUTOMATED: CPT | Performed by: NURSE PRACTITIONER

## 2020-01-01 PROCEDURE — 92610 EVALUATE SWALLOWING FUNCTION: CPT | Performed by: SPEECH-LANGUAGE PATHOLOGIST

## 2020-01-01 PROCEDURE — 90471 IMMUNIZATION ADMIN: CPT | Performed by: FAMILY MEDICINE

## 2020-01-01 PROCEDURE — 99497 ADVNCD CARE PLAN 30 MIN: CPT | Performed by: CLINICAL NURSE SPECIALIST

## 2020-01-01 PROCEDURE — 25010000002 LEVETRIRACETAM PER 10 MG: Performed by: INTERNAL MEDICINE

## 2020-01-01 PROCEDURE — 97112 NEUROMUSCULAR REEDUCATION: CPT | Performed by: PHYSICAL THERAPIST

## 2020-01-01 PROCEDURE — 99214 OFFICE O/P EST MOD 30 MIN: CPT | Performed by: FAMILY MEDICINE

## 2020-01-01 PROCEDURE — 81003 URINALYSIS AUTO W/O SCOPE: CPT | Performed by: NURSE PRACTITIONER

## 2020-01-01 PROCEDURE — 0 GADOBENATE DIMEGLUMINE 529 MG/ML SOLUTION: Performed by: NEUROLOGICAL SURGERY

## 2020-01-01 PROCEDURE — 71045 X-RAY EXAM CHEST 1 VIEW: CPT

## 2020-01-01 PROCEDURE — 70460 CT HEAD/BRAIN W/DYE: CPT

## 2020-01-01 PROCEDURE — 73030 X-RAY EXAM OF SHOULDER: CPT

## 2020-01-01 PROCEDURE — 99213 OFFICE O/P EST LOW 20 MIN: CPT | Performed by: NEUROLOGICAL SURGERY

## 2020-01-01 PROCEDURE — 99222 1ST HOSP IP/OBS MODERATE 55: CPT | Performed by: CLINICAL NURSE SPECIALIST

## 2020-01-01 PROCEDURE — 90686 IIV4 VACC NO PRSV 0.5 ML IM: CPT | Performed by: FAMILY MEDICINE

## 2020-01-01 PROCEDURE — 80053 COMPREHEN METABOLIC PANEL: CPT | Performed by: EMERGENCY MEDICINE

## 2020-01-01 PROCEDURE — 25010000002 TEMOZOLOMIDE PER 5 MG: Performed by: NURSE PRACTITIONER

## 2020-01-01 PROCEDURE — 99285 EMERGENCY DEPT VISIT HI MDM: CPT

## 2020-01-01 PROCEDURE — 99254 IP/OBS CNSLTJ NEW/EST MOD 60: CPT | Performed by: NEUROLOGICAL SURGERY

## 2020-01-01 RX ORDER — FLUTICASONE PROPIONATE 50 MCG
2 SPRAY, SUSPENSION (ML) NASAL DAILY PRN
COMMUNITY

## 2020-01-01 RX ORDER — CLOBETASOL PROPIONATE 0.46 MG/ML
SOLUTION TOPICAL EVERY 12 HOURS SCHEDULED
Status: DISCONTINUED | OUTPATIENT
Start: 2020-01-01 | End: 2020-01-01 | Stop reason: CLARIF

## 2020-01-01 RX ORDER — CLOBETASOL PROPIONATE 0.46 MG/ML
1 SOLUTION TOPICAL
Status: ON HOLD | COMMUNITY
Start: 2020-01-01 | End: 2021-01-01

## 2020-01-01 RX ORDER — TRIAMCINOLONE ACETONIDE 1 MG/G
1 CREAM TOPICAL 2 TIMES DAILY PRN
Status: ON HOLD | COMMUNITY
Start: 2020-01-01 | End: 2021-01-01

## 2020-01-01 RX ORDER — CLOBETASOL PROPIONATE 0.5 MG/G
CREAM TOPICAL EVERY 12 HOURS SCHEDULED
Status: DISCONTINUED | OUTPATIENT
Start: 2020-01-01 | End: 2021-01-01 | Stop reason: HOSPADM

## 2020-01-01 RX ORDER — ALPRAZOLAM 0.25 MG/1
TABLET ORAL
Qty: 60 TABLET | Refills: 0 | Status: SHIPPED | OUTPATIENT
Start: 2020-01-01 | End: 2021-01-01 | Stop reason: HOSPADM

## 2020-01-01 RX ORDER — PROMETHAZINE HYDROCHLORIDE 25 MG/1
12.5 TABLET ORAL EVERY 4 HOURS PRN
Status: DISCONTINUED | OUTPATIENT
Start: 2020-01-01 | End: 2021-01-01 | Stop reason: HOSPADM

## 2020-01-01 RX ORDER — ALLOPURINOL 300 MG/1
300 TABLET ORAL AS NEEDED
Status: DISCONTINUED | OUTPATIENT
Start: 2020-01-01 | End: 2020-01-01

## 2020-01-01 RX ORDER — OXYBUTYNIN CHLORIDE 5 MG/1
5 TABLET ORAL DAILY
Status: DISCONTINUED | OUTPATIENT
Start: 2020-01-01 | End: 2021-01-01 | Stop reason: HOSPADM

## 2020-01-01 RX ORDER — POLYETHYLENE GLYCOL 3350 17 G/17G
17 POWDER, FOR SOLUTION ORAL DAILY
Status: DISCONTINUED | OUTPATIENT
Start: 2020-01-01 | End: 2021-01-01 | Stop reason: HOSPADM

## 2020-01-01 RX ORDER — POLYETHYLENE GLYCOL 3350 17 G/17G
17 POWDER, FOR SOLUTION ORAL DAILY
Status: ON HOLD
Start: 2021-01-01 | End: 2021-01-01

## 2020-01-01 RX ORDER — DIVALPROEX SODIUM 500 MG/1
1000 TABLET, DELAYED RELEASE ORAL EVERY 8 HOURS SCHEDULED
Status: DISCONTINUED | OUTPATIENT
Start: 2020-01-01 | End: 2021-01-01 | Stop reason: HOSPADM

## 2020-01-01 RX ORDER — LOSARTAN POTASSIUM 100 MG/1
100 TABLET ORAL DAILY
Qty: 90 TABLET | Refills: 1 | Status: SHIPPED | OUTPATIENT
Start: 2020-01-01

## 2020-01-01 RX ORDER — SODIUM CHLORIDE 0.9 % (FLUSH) 0.9 %
10 SYRINGE (ML) INJECTION AS NEEDED
Status: DISCONTINUED | OUTPATIENT
Start: 2020-01-01 | End: 2021-01-01 | Stop reason: HOSPADM

## 2020-01-01 RX ORDER — METOPROLOL SUCCINATE 50 MG/1
50 TABLET, EXTENDED RELEASE ORAL NIGHTLY
Status: DISCONTINUED | OUTPATIENT
Start: 2020-01-01 | End: 2021-01-01 | Stop reason: HOSPADM

## 2020-01-01 RX ORDER — ACETAMINOPHEN 650 MG/1
650 SUPPOSITORY RECTAL EVERY 4 HOURS PRN
Status: DISCONTINUED | OUTPATIENT
Start: 2020-01-01 | End: 2021-01-01 | Stop reason: HOSPADM

## 2020-01-01 RX ORDER — LEVETIRACETAM 250 MG/1
TABLET ORAL
Qty: 60 TABLET | Refills: 5 | Status: ON HOLD | OUTPATIENT
Start: 2020-01-01 | End: 2021-01-01

## 2020-01-01 RX ORDER — LEVETIRACETAM 1000 MG/1
TABLET ORAL
Qty: 60 TABLET | Refills: 5 | Status: ON HOLD | OUTPATIENT
Start: 2020-01-01 | End: 2021-01-01

## 2020-01-01 RX ORDER — AMLODIPINE BESYLATE 5 MG/1
5 TABLET ORAL DAILY
Qty: 30 TABLET | Refills: 0 | Status: SHIPPED | OUTPATIENT
Start: 2020-01-01 | End: 2021-01-01

## 2020-01-01 RX ORDER — CYCLOBENZAPRINE HCL 10 MG
5 TABLET ORAL 3 TIMES DAILY PRN
Status: DISCONTINUED | OUTPATIENT
Start: 2020-01-01 | End: 2021-01-01 | Stop reason: HOSPADM

## 2020-01-01 RX ORDER — OXYCODONE AND ACETAMINOPHEN 10; 325 MG/1; MG/1
1 TABLET ORAL EVERY 4 HOURS PRN
Status: DISCONTINUED | OUTPATIENT
Start: 2020-01-01 | End: 2021-01-01 | Stop reason: HOSPADM

## 2020-01-01 RX ORDER — LACOSAMIDE 150 MG/1
TABLET, FILM COATED ORAL
Qty: 60 TABLET | Refills: 2 | Status: ON HOLD | OUTPATIENT
Start: 2020-01-01 | End: 2021-01-01

## 2020-01-01 RX ORDER — METOPROLOL SUCCINATE 50 MG/1
25 TABLET, EXTENDED RELEASE ORAL DAILY
Qty: 90 TABLET | Refills: 0 | Status: ON HOLD | OUTPATIENT
Start: 2020-01-01 | End: 2021-01-01

## 2020-01-01 RX ORDER — LEVETIRACETAM 250 MG/1
250 TABLET ORAL 2 TIMES DAILY
Status: DISCONTINUED | OUTPATIENT
Start: 2020-01-01 | End: 2020-01-01

## 2020-01-01 RX ORDER — ACETAMINOPHEN 325 MG/1
650 TABLET ORAL EVERY 4 HOURS PRN
Status: ON HOLD
Start: 2020-01-01 | End: 2021-01-01

## 2020-01-01 RX ORDER — LACTULOSE 20 G/30ML
20 SOLUTION ORAL DAILY PRN
Status: DISCONTINUED | OUTPATIENT
Start: 2020-01-01 | End: 2021-01-01 | Stop reason: HOSPADM

## 2020-01-01 RX ORDER — FUROSEMIDE 20 MG/1
TABLET ORAL
Status: ON HOLD | COMMUNITY
Start: 2020-01-01 | End: 2020-01-01

## 2020-01-01 RX ORDER — TEMOZOLOMIDE 100 MG/1
300 CAPSULE ORAL DAILY
Status: ACTIVE | OUTPATIENT
Start: 2020-01-01 | End: 2020-01-01

## 2020-01-01 RX ORDER — LACOSAMIDE 50 MG/1
150 TABLET ORAL EVERY 12 HOURS SCHEDULED
Status: DISCONTINUED | OUTPATIENT
Start: 2020-01-01 | End: 2021-01-01 | Stop reason: HOSPADM

## 2020-01-01 RX ORDER — SODIUM CHLORIDE, SODIUM LACTATE, POTASSIUM CHLORIDE, CALCIUM CHLORIDE 600; 310; 30; 20 MG/100ML; MG/100ML; MG/100ML; MG/100ML
75 INJECTION, SOLUTION INTRAVENOUS CONTINUOUS
Status: DISCONTINUED | OUTPATIENT
Start: 2020-01-01 | End: 2020-01-01

## 2020-01-01 RX ORDER — LOSARTAN POTASSIUM 50 MG/1
100 TABLET ORAL DAILY
Status: DISCONTINUED | OUTPATIENT
Start: 2020-01-01 | End: 2021-01-01 | Stop reason: HOSPADM

## 2020-01-01 RX ORDER — ACETAMINOPHEN 325 MG/1
650 TABLET ORAL EVERY 4 HOURS PRN
Status: DISCONTINUED | OUTPATIENT
Start: 2020-01-01 | End: 2021-01-01 | Stop reason: HOSPADM

## 2020-01-01 RX ORDER — POTASSIUM CHLORIDE 750 MG/1
20 CAPSULE, EXTENDED RELEASE ORAL ONCE
Status: COMPLETED | OUTPATIENT
Start: 2020-01-01 | End: 2020-01-01

## 2020-01-01 RX ORDER — ONDANSETRON 2 MG/ML
4 INJECTION INTRAMUSCULAR; INTRAVENOUS EVERY 6 HOURS PRN
Status: DISCONTINUED | OUTPATIENT
Start: 2020-01-01 | End: 2021-01-01 | Stop reason: HOSPADM

## 2020-01-01 RX ORDER — AMLODIPINE BESYLATE 5 MG/1
5 TABLET ORAL DAILY
Status: DISCONTINUED | OUTPATIENT
Start: 2020-01-01 | End: 2021-01-01 | Stop reason: HOSPADM

## 2020-01-01 RX ORDER — MEGESTROL ACETATE 40 MG/ML
400 SUSPENSION ORAL DAILY
Status: DISCONTINUED | OUTPATIENT
Start: 2020-01-01 | End: 2021-01-01 | Stop reason: HOSPADM

## 2020-01-01 RX ORDER — ALPRAZOLAM 0.25 MG/1
0.25 TABLET ORAL 2 TIMES DAILY PRN
Status: DISCONTINUED | OUTPATIENT
Start: 2020-01-01 | End: 2021-01-01 | Stop reason: HOSPADM

## 2020-01-01 RX ORDER — DEXAMETHASONE SODIUM PHOSPHATE 10 MG/ML
10 INJECTION INTRAMUSCULAR; INTRAVENOUS ONCE
Status: COMPLETED | OUTPATIENT
Start: 2020-01-01 | End: 2020-01-01

## 2020-01-01 RX ORDER — METOPROLOL SUCCINATE 50 MG/1
50 TABLET, EXTENDED RELEASE ORAL DAILY
Status: DISCONTINUED | OUTPATIENT
Start: 2020-01-01 | End: 2020-01-01

## 2020-01-01 RX ORDER — TAMSULOSIN HYDROCHLORIDE 0.4 MG/1
0.4 CAPSULE ORAL DAILY
Status: DISCONTINUED | OUTPATIENT
Start: 2020-01-01 | End: 2021-01-01 | Stop reason: HOSPADM

## 2020-01-01 RX ORDER — AMOXICILLIN 250 MG
2 CAPSULE ORAL NIGHTLY
Status: DISCONTINUED | OUTPATIENT
Start: 2020-01-01 | End: 2021-01-01 | Stop reason: HOSPADM

## 2020-01-01 RX ORDER — LACTULOSE 20 G/30ML
20 SOLUTION ORAL DAILY PRN
Qty: 450 ML
Start: 2020-01-01

## 2020-01-01 RX ORDER — VENLAFAXINE HYDROCHLORIDE 75 MG/1
150 CAPSULE, EXTENDED RELEASE ORAL DAILY
Status: DISCONTINUED | OUTPATIENT
Start: 2020-01-01 | End: 2021-01-01 | Stop reason: HOSPADM

## 2020-01-01 RX ORDER — TAMSULOSIN HYDROCHLORIDE 0.4 MG/1
CAPSULE ORAL
Qty: 30 CAPSULE | Refills: 0 | Status: ON HOLD | OUTPATIENT
Start: 2020-01-01 | End: 2020-01-01

## 2020-01-01 RX ORDER — SODIUM CHLORIDE 0.9 % (FLUSH) 0.9 %
10 SYRINGE (ML) INJECTION EVERY 12 HOURS SCHEDULED
Status: DISCONTINUED | OUTPATIENT
Start: 2020-01-01 | End: 2021-01-01 | Stop reason: HOSPADM

## 2020-01-01 RX ORDER — OXYCODONE AND ACETAMINOPHEN 10; 325 MG/1; MG/1
1 TABLET ORAL EVERY 6 HOURS PRN
Qty: 12 TABLET | Refills: 0 | Status: ON HOLD | OUTPATIENT
Start: 2020-01-01 | End: 2021-01-01

## 2020-01-01 RX ORDER — PANTOPRAZOLE SODIUM 40 MG/1
TABLET, DELAYED RELEASE ORAL
Qty: 90 TABLET | Refills: 0 | Status: ON HOLD | OUTPATIENT
Start: 2020-01-01 | End: 2021-01-01

## 2020-01-01 RX ORDER — LEVETIRACETAM 500 MG/1
1250 TABLET ORAL 2 TIMES DAILY
Status: DISCONTINUED | OUTPATIENT
Start: 2020-01-01 | End: 2020-01-01

## 2020-01-01 RX ORDER — LORAZEPAM 0.5 MG/1
TABLET ORAL
Qty: 2 TABLET | Refills: 0 | Status: ON HOLD | OUTPATIENT
Start: 2020-01-01 | End: 2020-01-01

## 2020-01-01 RX ORDER — MEGESTROL ACETATE 40 MG/ML
400 SUSPENSION ORAL DAILY
Status: ON HOLD
Start: 2021-01-01 | End: 2021-01-01

## 2020-01-01 RX ORDER — PANTOPRAZOLE SODIUM 40 MG/1
40 TABLET, DELAYED RELEASE ORAL DAILY
Status: DISCONTINUED | OUTPATIENT
Start: 2020-01-01 | End: 2021-01-01 | Stop reason: HOSPADM

## 2020-01-01 RX ORDER — LABETALOL HYDROCHLORIDE 5 MG/ML
10 INJECTION, SOLUTION INTRAVENOUS EVERY 6 HOURS PRN
Status: DISCONTINUED | OUTPATIENT
Start: 2020-01-01 | End: 2021-01-01 | Stop reason: HOSPADM

## 2020-01-01 RX ORDER — CYCLOBENZAPRINE HCL 5 MG
5 TABLET ORAL 3 TIMES DAILY PRN
Qty: 30 TABLET | Refills: 0
Start: 2020-01-01 | End: 2021-01-01

## 2020-01-01 RX ORDER — OXYCODONE AND ACETAMINOPHEN 10; 325 MG/1; MG/1
1 TABLET ORAL ONCE AS NEEDED
Status: COMPLETED | OUTPATIENT
Start: 2020-01-01 | End: 2020-01-01

## 2020-01-01 RX ORDER — ACETAMINOPHEN 160 MG/5ML
650 SOLUTION ORAL EVERY 4 HOURS PRN
Status: DISCONTINUED | OUTPATIENT
Start: 2020-01-01 | End: 2021-01-01 | Stop reason: HOSPADM

## 2020-01-01 RX ORDER — ONDANSETRON 4 MG/1
4 TABLET, FILM COATED ORAL EVERY 6 HOURS PRN
Status: DISCONTINUED | OUTPATIENT
Start: 2020-01-01 | End: 2021-01-01 | Stop reason: HOSPADM

## 2020-01-01 RX ADMIN — LABETALOL HYDROCHLORIDE 10 MG: 5 INJECTION, SOLUTION INTRAVENOUS at 09:49

## 2020-01-01 RX ADMIN — LACOSAMIDE 150 MG: 50 TABLET, FILM COATED ORAL at 10:24

## 2020-01-01 RX ADMIN — DIVALPROEX SODIUM 1000 MG: 500 TABLET, DELAYED RELEASE ORAL at 16:29

## 2020-01-01 RX ADMIN — DEXAMETHASONE SODIUM PHOSPHATE 10 MG: 10 INJECTION INTRAMUSCULAR; INTRAVENOUS at 11:24

## 2020-01-01 RX ADMIN — AMLODIPINE BESYLATE 5 MG: 5 TABLET ORAL at 11:42

## 2020-01-01 RX ADMIN — SODIUM CHLORIDE, POTASSIUM CHLORIDE, SODIUM LACTATE AND CALCIUM CHLORIDE 75 ML/HR: 600; 310; 30; 20 INJECTION, SOLUTION INTRAVENOUS at 18:59

## 2020-01-01 RX ADMIN — IOPAMIDOL 100 ML: 612 INJECTION, SOLUTION INTRAVENOUS at 13:33

## 2020-01-01 RX ADMIN — OXYBUTYNIN CHLORIDE 5 MG: 5 TABLET ORAL at 11:38

## 2020-01-01 RX ADMIN — LOSARTAN POTASSIUM 100 MG: 50 TABLET, FILM COATED ORAL at 09:05

## 2020-01-01 RX ADMIN — METOPROLOL SUCCINATE 50 MG: 50 TABLET, EXTENDED RELEASE ORAL at 21:27

## 2020-01-01 RX ADMIN — LACOSAMIDE 150 MG: 50 TABLET, FILM COATED ORAL at 21:34

## 2020-01-01 RX ADMIN — DIVALPROEX SODIUM 1000 MG: 500 TABLET, DELAYED RELEASE ORAL at 15:22

## 2020-01-01 RX ADMIN — TEMOZOLOMIDE 300 MG: 100 CAPSULE ORAL at 10:28

## 2020-01-01 RX ADMIN — DIVALPROEX SODIUM 1000 MG: 500 TABLET, DELAYED RELEASE ORAL at 13:00

## 2020-01-01 RX ADMIN — MEGESTROL ACETATE 400 MG: 40 SUSPENSION ORAL at 09:05

## 2020-01-01 RX ADMIN — VENLAFAXINE HYDROCHLORIDE 150 MG: 75 CAPSULE, EXTENDED RELEASE ORAL at 11:38

## 2020-01-01 RX ADMIN — MEGESTROL ACETATE 400 MG: 40 SUSPENSION ORAL at 10:25

## 2020-01-01 RX ADMIN — LEVETIRACETAM 1250 MG: 500 TABLET, FILM COATED ORAL at 21:34

## 2020-01-01 RX ADMIN — CLOBETASOL PROPIONATE: 0.5 CREAM TOPICAL at 09:06

## 2020-01-01 RX ADMIN — POTASSIUM CHLORIDE 20 MEQ: 750 CAPSULE, EXTENDED RELEASE ORAL at 10:25

## 2020-01-01 RX ADMIN — DIVALPROEX SODIUM 1000 MG: 500 TABLET, DELAYED RELEASE ORAL at 21:27

## 2020-01-01 RX ADMIN — SODIUM CHLORIDE, POTASSIUM CHLORIDE, SODIUM LACTATE AND CALCIUM CHLORIDE 75 ML/HR: 600; 310; 30; 20 INJECTION, SOLUTION INTRAVENOUS at 22:00

## 2020-01-01 RX ADMIN — DIVALPROEX SODIUM 1000 MG: 500 TABLET, DELAYED RELEASE ORAL at 05:42

## 2020-01-01 RX ADMIN — SODIUM CHLORIDE, PRESERVATIVE FREE 10 ML: 5 INJECTION INTRAVENOUS at 21:33

## 2020-01-01 RX ADMIN — LACOSAMIDE 150 MG: 50 TABLET, FILM COATED ORAL at 12:55

## 2020-01-01 RX ADMIN — OXYCODONE HYDROCHLORIDE AND ACETAMINOPHEN 1 TABLET: 10; 325 TABLET ORAL at 09:08

## 2020-01-01 RX ADMIN — OXYCODONE HYDROCHLORIDE AND ACETAMINOPHEN 1 TABLET: 10; 325 TABLET ORAL at 07:09

## 2020-01-01 RX ADMIN — POLYETHYLENE GLYCOL (3350) 17 G: 17 POWDER, FOR SOLUTION ORAL at 09:06

## 2020-01-01 RX ADMIN — LOSARTAN POTASSIUM 100 MG: 50 TABLET, FILM COATED ORAL at 11:41

## 2020-01-01 RX ADMIN — AMLODIPINE BESYLATE 5 MG: 5 TABLET ORAL at 09:05

## 2020-01-01 RX ADMIN — MEGESTROL ACETATE 400 MG: 40 SUSPENSION ORAL at 12:55

## 2020-01-01 RX ADMIN — LACOSAMIDE 150 MG: 50 TABLET, FILM COATED ORAL at 09:05

## 2020-01-01 RX ADMIN — CLOBETASOL PROPIONATE: 0.5 CREAM TOPICAL at 12:55

## 2020-01-01 RX ADMIN — DIVALPROEX SODIUM 1000 MG: 500 TABLET, DELAYED RELEASE ORAL at 21:34

## 2020-01-01 RX ADMIN — LEVETIRACETAM 1250 MG: 500 TABLET, FILM COATED ORAL at 10:24

## 2020-01-01 RX ADMIN — GADOBENATE DIMEGLUMINE 18 ML: 529 INJECTION, SOLUTION INTRAVENOUS at 12:05

## 2020-01-01 RX ADMIN — PANTOPRAZOLE SODIUM 40 MG: 40 TABLET, DELAYED RELEASE ORAL at 10:25

## 2020-01-01 RX ADMIN — POLYETHYLENE GLYCOL (3350) 17 G: 17 POWDER, FOR SOLUTION ORAL at 12:55

## 2020-01-01 RX ADMIN — TAMSULOSIN HYDROCHLORIDE 0.4 MG: 0.4 CAPSULE ORAL at 11:41

## 2020-01-01 RX ADMIN — SODIUM CHLORIDE, PRESERVATIVE FREE 10 ML: 5 INJECTION INTRAVENOUS at 21:29

## 2020-01-01 RX ADMIN — DOCUSATE SODIUM 50 MG AND SENNOSIDES 8.6 MG 2 TABLET: 8.6; 5 TABLET, FILM COATED ORAL at 21:27

## 2020-01-01 RX ADMIN — TAMSULOSIN HYDROCHLORIDE 0.4 MG: 0.4 CAPSULE ORAL at 10:25

## 2020-01-01 RX ADMIN — METOPROLOL SUCCINATE 50 MG: 50 TABLET, EXTENDED RELEASE ORAL at 21:34

## 2020-01-01 RX ADMIN — POLYETHYLENE GLYCOL (3350) 17 G: 17 POWDER, FOR SOLUTION ORAL at 10:25

## 2020-01-01 RX ADMIN — CLOBETASOL PROPIONATE: 0.5 CREAM TOPICAL at 10:25

## 2020-01-01 RX ADMIN — SODIUM CHLORIDE, PRESERVATIVE FREE 10 ML: 5 INJECTION INTRAVENOUS at 10:28

## 2020-01-01 RX ADMIN — OXYCODONE HYDROCHLORIDE AND ACETAMINOPHEN 1 TABLET: 10; 325 TABLET ORAL at 14:49

## 2020-01-01 RX ADMIN — DIVALPROEX SODIUM 1000 MG: 500 TABLET, DELAYED RELEASE ORAL at 05:31

## 2020-01-01 RX ADMIN — SODIUM CHLORIDE, PRESERVATIVE FREE 10 ML: 5 INJECTION INTRAVENOUS at 09:06

## 2020-01-01 RX ADMIN — LEVETIRACETAM 1250 MG: 500 TABLET, FILM COATED ORAL at 09:05

## 2020-01-01 RX ADMIN — LOSARTAN POTASSIUM 100 MG: 50 TABLET, FILM COATED ORAL at 10:26

## 2020-01-01 RX ADMIN — LEVETIRACETAM 1250 MG: 100 INJECTION, SOLUTION INTRAVENOUS at 11:36

## 2020-01-01 RX ADMIN — CLOBETASOL PROPIONATE 1 APPLICATION: 0.5 CREAM TOPICAL at 21:35

## 2020-01-01 RX ADMIN — VENLAFAXINE HYDROCHLORIDE 150 MG: 75 CAPSULE, EXTENDED RELEASE ORAL at 09:05

## 2020-01-01 RX ADMIN — DOCUSATE SODIUM 50 MG AND SENNOSIDES 8.6 MG 2 TABLET: 8.6; 5 TABLET, FILM COATED ORAL at 21:34

## 2020-01-01 RX ADMIN — OXYBUTYNIN CHLORIDE 5 MG: 5 TABLET ORAL at 09:05

## 2020-01-01 RX ADMIN — LEVETIRACETAM 1250 MG: 100 INJECTION, SOLUTION INTRAVENOUS at 22:01

## 2020-01-01 RX ADMIN — OXYCODONE HYDROCHLORIDE AND ACETAMINOPHEN 1 TABLET: 10; 325 TABLET ORAL at 18:57

## 2020-01-01 RX ADMIN — PANTOPRAZOLE SODIUM 40 MG: 40 TABLET, DELAYED RELEASE ORAL at 09:06

## 2020-01-01 RX ADMIN — CLOBETASOL PROPIONATE: 0.5 CREAM TOPICAL at 21:30

## 2020-01-01 RX ADMIN — VENLAFAXINE HYDROCHLORIDE 150 MG: 75 CAPSULE, EXTENDED RELEASE ORAL at 10:25

## 2020-01-01 RX ADMIN — SODIUM CHLORIDE, PRESERVATIVE FREE 10 ML: 5 INJECTION INTRAVENOUS at 22:01

## 2020-01-01 RX ADMIN — AMLODIPINE BESYLATE 5 MG: 5 TABLET ORAL at 10:26

## 2020-01-01 RX ADMIN — LACOSAMIDE 150 MG: 50 TABLET, FILM COATED ORAL at 21:29

## 2020-01-01 RX ADMIN — LEVETIRACETAM 1250 MG: 500 TABLET, FILM COATED ORAL at 21:27

## 2020-01-01 RX ADMIN — OXYBUTYNIN CHLORIDE 5 MG: 5 TABLET ORAL at 10:26

## 2020-01-01 NOTE — PROGRESS NOTES
[facility]  MD Surekha Shirley, LAITHN, PA-C  ___________________________________________________________  [facility]  Department of Radiation Oncology  [address] [place] [id number]  Office: [phone]  Fax: [phone]    DATE:  01/02/2020    PATIENT:   Lukasz Savage 1965                                   MEDICAL RECORD #:  [id number]                                                       REASON FOR FOLLOW UP VISIT:  Astrocytoma brain tumor (CMS/HCC) [C71.9]    Lukasz Savage is a very pleasant 55 y.o. patient that has completed radiation therapy to the brain and returns to the clinic today for routine follow up exam.  Reports doing well, headaches occasionally which Tylenol controls.  States he has right shoulder pain, no acute injury, pain is managed by Dr. Luis Miguel Doyle's office.    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 Underwent right frontal craniotomy on April 16, 2019, lesion was 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  Treated with Temodar concurrent with radiation therapy to the brain, 6000 cGy on 07/30/2019.     07/03/2017 - CT Head without contrast:  • Bilateral occipital lobe hypodensity compatible with subacute ischemic change. MRI correlation recommended.    07/03/2017 - MRI brain with and without contrast:  • Posterior reversible encephalopathy syndrome.   • Follow-up in 8 weeks is suggested.    07/08/2017 - CT head with and without contrast:  • Negative CT brain without and with contrast    08/29/2017 - MRI brain with and without contrast:  • Interval resolution of posterior cortical and subcortical FLAIR signal.  • No stroke, hemorrhage, or abnormal enhancement.  • No new or increased abnormality    04/02/2019 - MRI Brain with and without contrast:  • There is an approximately 4.5 x 4.5 x 3.5 cm focus of masslike FLAIR hyperintensity in the  posterior right frontal lobe (series 401-image 23 and series 901-image 16).   • There is a second discrete FLAIR hyperintense nodule in the subcortical white matter of the paramedian posterior right frontal lobe immediately above the corpus callosum measuring 1.1 x 1.9 x 1.5 cm (series 401-image 22 and series 901-image 16).   • Notable diffusion signal hyperintensity in this second area with a faint signal hyperintensity in the larger area.   • ADC shows areas of low signal centrally within the larger area of FLAIR abnormality as well as diffusely throughout the smaller nodular hyperintensity (series 204-images 172 and 156).     04/08/2019 - Appointment with :  • Presents from his neurology office after left facial and upper extremity focal motor seizure. MRI was read as abnormal he is here to discuss the MRI results.  In July 2017 he was hospitalized for PRES syndrome. Presented as a seizure at that time and was intubated trached with an extended hospital stay and recovery.  He did very well however and fully recovered.    • In February 2019 he started to develop left facial twitching and left upper extremity twitching.  He has been started on Keppra 500 mg twice daily for just a few days.    • He has not seen any improvement in the focal motor seizures.    • He also relates some difficulty with speech and focus and concentration.    Recommendations:  • We will get him preop and scheduled for a right stereotactic brain biopsy as soon as possible.    04/15/2019 - Craniotomy with biopsy per :  1-3.  Brain, right frontal, biopsy:  • Anaplastic Infiltrating Glioma (see comment)    04/15/2019 - CT Head without contrast:  • Status post right craniotomy with a tract of air extending to the medial right frontal lobe which is presumably related to a biopsy tract.  • Ill-defined hyperdense area in the medial right frontal lobe corresponds with the known mass seen on recent MRI exam.    05/09/2019 -  Appointment with :  • Pathology is consistent with Grade 3 Anaplastic Astrocytoma.   • In my opinion this is not amenable to a maximal safe surgical resection I think any effective surgery that would give a survival benefit would likely severely disabled him.   • I am going to insist that he get a second opinion from the [facility] we will make a referral to Drew Gutierrez.   • We will also make a referral to our radiation oncology and our local oncologist for chemotherapeutic discussions.   • I did extensive discussion with the patient and his family. Their questions and concerns were patiently addressed. I will reach out to the [facility] to discuss the case with Dr. Gutierrez and we will get him set up for radiation oncology and oncology referrals.   •  I will see him in follow-up in about 3 months with a repeat MRI of the brain    05/10/2019 - Presented to ER after falling in the bathroom earlier this morning.  Patient states he slipped and fell while getting out of the bathtub striking his back on the bathtub.  He denies syncope or chest pain before this event.  He states he just slipped and fell. He denies any other injury.     05/10/2019 - CT lumbar spine with and without contrast:  • No CT evidence of acute osseous abnormality.  • Multilevel disc degeneration and spondylosis    05/17/2019 - Consult with :  • The patient and his family verbalize understanding of this discussion, voice no further questions and wish to proceed with recommended therapy of combined modality Temodar and radiation therapy for definitive treatment, anticipate a course of 7895-4771 cGy, final treatment plan to be determined  • We will perform CT simulation on May 20th (Monday) to initiate the treatment planning with the intent to begin treatments as soon as possible.     06/03/2019 - CT Head:  • Evidence of previous right craniotomy for biopsy of 2 right frontal masses which appear  increased in size compared with the previous examinations, concerning for tumor progression.   • No intracranial hemorrhage or midline shift is identified.    06/03/2019 - MRI Brain:  • Increased size of right frontal lobe mass lesions compared to 04/02/2019, which are described in more detail above.    06/12/2019 - 07/30/2019 - Completed Radiation course:  • Received 6000 cGy in 30 fractions to brain via external beam radiation therapy.    06/14/2019 - CT Head:  • Stable CT appearance of the head compared to 06/03/2019, as described above.           06/17/2019 - MRI Brain:  • Approximately 3 cm enhancing partially necrotic tumor immediately above the right corpus callosum body. Associated FLAIR signal is noted immediately above it. Faint enhancement and FLAIR signal extending to the right precentral gyrus, consistent with additional tumor extension.    06/21/2019 - CT Head:  • The hyperdense foci of the right frontal lobe are similar to the 06/14/2019 CT head exam representing the peripherally enhancing centrally necrotic lesion with adjacent lateral enhancement in the right frontal lobe adjacent the corpus callosum on the MRI brain of 06/17/2019. No intracranial hemorrhage identified.    06/29/2019 - CT Head:  • No acute intracranial abnormality, there are ill-defined masses in the right frontal lobe compatible the history of brain neoplasm, similar to the previous head CT. The largest measures about 3.2 cm and shows mild central necrosis.    07/15/2019 - CT Head:  • Stable appearance of the brain compared to the exam from 06/29/2019, with hyperdense masses in the right frontal lobe without associated midline shift. Right craniotomy defect is noted with a hyperdense area just deep to it, also stable from the previous exam. This may represent an additional mass. No acute intracranial findings are appreciated.    08/27/2019 - MRI Brain:  • Interval decreased size and mass effect of the solid enhancing nodule  adjacent to the posterior body/splenium of the corpus callosum. This suggests a positive treatment response. The increasing FLAIR abnormality in this area is attributed to an early radiation induced change.  • The more superficial signal abnormality involving the posterior right frontal lobe is stable or perhaps slightly less prominent on FLAIR.   • No new enhancing nodule in this area to suggest disease progression.    08/27/2019 - Appointment with :  • The patient is recovering well from his difficulties with seizures in July.   •  I do think he has some improvement still to make.    • I did extensive discussion with the radiologist about the MRI findings I think that the overall tumor burden has not increased in fact it may be a little better.    • I think the white matter changes that were seeing her edema/radiation effect.    • We are to put him on a dedicated course of steroids for about 9 days and we are in order outpatient physical therapy for strength gait and balance training.    • We will see him in follow-up in about 3 months with repeat imaging.    10/02/2019 - Appointment with :  • Return to Dr. Jessica in 3 months  • Continue to follow with Dr. Trevino  • Referral placed to Dr. Garrido in pain management.    11/26/2019 - MRI Brain:  • Decreased abnormal FLAIR signal in the right frontal lobe.  • No new area of abnormal enhancement to suggest progression of disease.    11/21/2019 - Appointment with :  • Follow-up appointment given for 4 weeks  • Continue to follow-up with other medical providers as recommended  • Anticipating initiating physical therapy and evaluation by Dr. Garrido  • Follow-up with Dr. Trevino as scheduled    11/26/2019 - Appointment with :  • The patient seems to be tolerating his treatments well.    • His MRI is stable as far as the enhancing portion and improved to start the white matter changes.    • We will see him in follow-up in about 3  months    12/17/2019 - Appointment with     History obtained from  PATIENT, FAMILY and CHART    PAST MEDICAL HISTORY   Past Medical History:   Diagnosis Date   • Anemia 6/17/2019   • Angio-edema 7/3/2017   • Anxiety    • Arthritis    • Cancer (CMS/HCC) 05/09/2019    Brain cancer diagnoised yesterday  Dr Trevino    • Clostridium difficile colitis    • Drug-induced hepatic toxicity 9/6/2019   • Erectile dysfunction 10/6/2016   • GERD (gastroesophageal reflux disease)    • Gout    • HCAP (healthcare-associated pneumonia) 7/11/2017   • Hyperlipidemia    • Malignant hypertension    • MSSA (methicillin susceptible Staphylococcus aureus) infection 7/31/2017   • Obstructive sleep apnea    • S/P shoulder surgery 7/27/2018   • Seizures (CMS/HCC) 7/4/2017   • 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 9/6/2019      PAST SURGICAL HISTORY   Past Surgical History:   Procedure Laterality Date   • COLONOSCOPY     • CRANIOTOMY Right 4/15/2019    Procedure: CRANIOTOMY WITH BIOPSY RIGHT;  Surgeon: Dillon Trevino MD;  Location:  [facility] OR;  Service: Neurosurgery   • SHOULDER ARTHROSCOPY Left    • TRACHEOSTOMY N/A 7/12/2017    Procedure: TRACHEOSTOMY WITH TRACHEOSCOPY;  Surgeon: Jairon Pierson MD;  Location:  [facility] OR;  Service:       FAMILY HISTORY  family history includes Diabetes in his maternal grandmother and mother; Heart attack in his paternal grandfather; Heart disease in his father; Hypertension in his father and mother; Kidney disease in his sister; No Known Problems in his daughter, maternal grandfather, paternal grandmother, and son.     SOCIAL HISTORY   Social History     Socioeconomic History   • Marital status: Single     Spouse name: Not on file   • Number of children: 2   • Years of education: Not on file   • Highest education level: Not on file   Occupational History   • Occupation: ELECTRIAL WORK   Tobacco Use   • Smoking status: Former Smoker     Packs/day: 0.33     Years: 30.00     Pack years: 9.90     Types:  Cigarettes     Last attempt to quit: 7/3/2017     Years since quittin.5   • Smokeless tobacco: Never Used   Substance and Sexual Activity   • Alcohol use: No     Frequency: Never     Comment: used to drink alot but now just ocasional beer since has seizure in 2017   • Drug use: No   • Sexual activity: Defer      ALLERGIES  Lipitor [atorvastatin]; Lisinopril; and Pregabalin     MEDICATIONS   Current Outpatient Medications   Medication Sig Dispense Refill   • allopurinol (ZYLOPRIM) 300 MG tablet   0   • aspirin 81 MG tablet Take 1 tablet by mouth Daily.     • divalproex (DEPAKOTE) 500 MG DR tablet Take 2 tablets by mouth Every 8 (Eight) Hours. 180 tablet 3   • levETIRAcetam (KEPPRA) 1000 MG tablet TAKE 1 TABLET BY MOUTH 2 (TWO) TIMES A DAY. 60 tablet 5   • levETIRAcetam (KEPPRA) 250 MG tablet TAKE 1 TABLET BY MOUTH 2 (TWO) TIMES A DAY. 60 tablet 5   • losartan (COZAAR) 25 MG tablet Take 1 tablet by mouth Daily. 30 tablet 2   • metoprolol succinate XL (TOPROL XL) 25 MG 24 hr tablet Take 1 tablet by mouth Daily. 30 tablet 6   • Omega-3 Fatty Acids (FISH OIL) 1000 MG capsule capsule TAKE TWO CAPSULES BY MOUTH EVERY MORNING WITH BREAKFAST 180 capsule 1   • omeprazole (prilOSEC) 10 MG capsule TAKE ONE CAPSULE BY MOUTH DAILY FOR ACID REFLUX 90 capsule 1   • ondansetron (ZOFRAN) 4 MG tablet   2   • oxyCODONE-acetaminophen (PERCOCET) 7.5-325 MG per tablet Take 1 tablet by mouth Every 4 (Four) Hours As Needed for Moderate Pain  or Severe Pain . 40 tablet 0   • promethazine (PHENERGAN) 12.5 MG tablet TAKE 1 TABLET BY MOUTH EVERY 4 (FOUR) HOURS AS NEEDED FOR NAUSEA OR VOMITING. 60 tablet 1   • venlafaxine XR (EFFEXOR-XR) 150 MG 24 hr capsule TAKE ONE CAPSULE BY MOUTH DAILY 90 capsule 1   • VIMPAT 150 MG tablet TAKE ONE TABLET BY MOUTH TWICE A DAY 60 tablet 2   • vitamin B-6 (PYRIDOXINE) 100 MG tablet TAKE 1 TABLET BY MOUTH DAILY. 30 tablet 5   • ALPRAZolam (XANAX) 0.25 MG tablet Take 1 tablet by mouth 2 (Two) Times a Day As  "Needed for Anxiety. 60 tablet 0   • lactulose 20 GM/30ML solution solution Take 30 mL by mouth Daily. (Patient taking differently: Take 20 g by mouth As Needed.) 946 mL 1     No current facility-administered medications for this visit.       The following portions of the patient's history were reviewed and updated as appropriate: allergies, current medications, past family history, past medical history, past social history, past surgical history and problem list.    REVIEW OF SYSTEMS  Review of Systems   Constitutional: Positive for fatigue. Negative for activity change, appetite change, chills, diaphoresis, fever and unexpected weight change.   Eyes: Negative.    Respiratory: Negative.    Cardiovascular: Negative.    Gastrointestinal: Negative.    Endocrine: Negative.    Genitourinary: Negative.    Musculoskeletal: Negative.         Gout  Allopurinol  Right shoulder is painful  Bruising noted   Skin: Negative.    Allergic/Immunologic: Negative.    Neurological: Positive for headaches. Negative for dizziness, tremors, seizures, syncope, facial asymmetry, speech difficulty, weakness, light-headedness and numbness.   Hematological: Negative.    Psychiatric/Behavioral: Negative.      PHYSICAL EXAM  VITAL SIGNS:   Vitals:    01/02/20 1344   BP: 142/93   Weight: 88.5 kg (195 lb)   Height: 172.7 cm (68\")   PainSc:   2   PainLoc: Head     General Appearance:  awake, alert, oriented, in no acute distress, cooperative. Appears stated age.   Head: Normocephalic, old surgical scars noted.  Eyes: Conjunctiva pink, pupils equal and reactive.   Ears:  Normal externally.  Hearing- normal to conversation  Nose/Sinuses:  Mucosa normal. No drainage or sinus tenderness.  Mouth/Throat:  Mucosa moist, no lesions; pharynx without erythema, edema or exudate.  Neck: Supple, no mass, non-tender  Back:  Symmetric, no curvature, ROM normal, no CVA tenderness   Lungs:  Normal expansion.  Clear to auscultation.  No rales, rhonchi, or " wheezing.  Heart:  Heart sounds are normal.  Regular rate and rhythm without murmur, gallop or rub.  Abdomen:  Soft, non-tender, normal bowel sounds; no bruits, organomegaly or masses.  Extremities: Warm to touch, pink, with no edema. Pulses 2+ bilaterally  Musculoskeletal: strength and sensation grossly normal, altered gait  Neurologic:  Alert and oriented, left hemaparesis  Psych exam: normal situational behavior   Skin:  Warm and moist. No suspicious lesions or rashes of concern     Clinical Quality Measures  -Pain Documented by Standardized Tool, FPS Lukasz Savage reports a pain score of 2.  Given his pain assessment as noted, treatment options were discussed and the following options were decided upon as a follow-up plan to address the patient's pain: continuation of current treatment plan for pain.  -Advanced Care Planning Documented  -Body Mass Index Screening and Follow-Up Plan Patient's Body mass index is 29.65 kg/m². BMI is above normal parameters. Recommendations include: educational material.  -Tobacco Use: Screening and Cessation Intervention Social History    Tobacco Use      Smoking status: Former Smoker        Packs/day: 0.33        Years: 30.00        Pack years: 9.9        Types: Cigarettes        Quit date: 7/3/2017        Years since quittin.5      Smokeless tobacco: Never Used    ASSESSMENT AND PLAN   1. Astrocytoma brain tumor (CMS/HCC)    2. History of radiation therapy    3. Right shoulder pain, unspecified chronicity    4. Former smoker      RECOMMENDATIONS:  Lukasz Savage returns to our clinic today for follow up evaluation after completing radiation in 2019. 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 Underwent right frontal craniotomy on 2019, lesion was 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  Treated with Temodar concurrent with radiation therapy to the brain, 6000 cGy on 2019.     Brain MRI completed on  11/26/2019 revealed redemonstration of the right frontoparietal craniotomy defects with expected extradural fluid and atelectasis artifact, previously seen area of T1 shortening with slight enhancement in the right medial frontal lobe posteriorly is redemonstrated associated extensive T2/FLAIR abnormal signal in the posterior right frontal lobe as significantly decreased, and no new area of abnormal enhancement.     There is no clinical evidence suggestive of local recurrence on exam at this time.  He follows closely with Dr. Trevino and Dr. Dumont with plans to see Dr. Trevino in March with an MRI of the brain.  I will not schedule a routine follow-up in our office but will be happy to see him in the future if needed     Patient Instructions   1. Return to Dr. Jessica only as needed, no routine follow up  2. Continue to follow with Dr. Trevino and Dr. Doyle as scheduled      Todays appointment time was spent in counseling and coordination of care related to patients diagnosis, radiation therapy possible and probable after effects and surveillance according to NCCN Guidelines.  01/02/2020   Oral Jessica III, MD

## 2020-01-02 ENCOUNTER — HOSPITAL ENCOUNTER (OUTPATIENT)
Dept: RADIATION ONCOLOGY | Facility: HOSPITAL | Age: 55
Setting detail: RADIATION/ONCOLOGY SERIES
End: 2020-01-02

## 2020-01-02 ENCOUNTER — OFFICE VISIT (OUTPATIENT)
Dept: RADIATION ONCOLOGY | Facility: HOSPITAL | Age: 55
End: 2020-01-02

## 2020-01-02 VITALS
WEIGHT: 195 LBS | HEIGHT: 68 IN | DIASTOLIC BLOOD PRESSURE: 93 MMHG | SYSTOLIC BLOOD PRESSURE: 142 MMHG | BODY MASS INDEX: 29.55 KG/M2

## 2020-01-02 DIAGNOSIS — C71.9 ASTROCYTOMA BRAIN TUMOR (HCC): Primary | ICD-10-CM

## 2020-01-02 DIAGNOSIS — Z92.3 HISTORY OF RADIATION THERAPY: ICD-10-CM

## 2020-01-02 DIAGNOSIS — Z87.891 FORMER SMOKER: ICD-10-CM

## 2020-01-02 DIAGNOSIS — M25.511 RIGHT SHOULDER PAIN, UNSPECIFIED CHRONICITY: ICD-10-CM

## 2020-01-02 PROCEDURE — G0463 HOSPITAL OUTPT CLINIC VISIT: HCPCS | Performed by: RADIOLOGY

## 2020-01-02 NOTE — PATIENT INSTRUCTIONS
1. Return to Dr. Jessica only as needed, no routine follow up  2. Continue to follow with Dr. Trevino and Dr. Doyle as scheduled

## 2020-01-06 ENCOUNTER — OFFICE VISIT (OUTPATIENT)
Dept: FAMILY MEDICINE CLINIC | Facility: CLINIC | Age: 55
End: 2020-01-06

## 2020-01-06 VITALS
BODY MASS INDEX: 29.34 KG/M2 | WEIGHT: 193.6 LBS | RESPIRATION RATE: 18 BRPM | OXYGEN SATURATION: 98 % | SYSTOLIC BLOOD PRESSURE: 130 MMHG | TEMPERATURE: 98.1 F | HEIGHT: 68 IN | HEART RATE: 86 BPM | DIASTOLIC BLOOD PRESSURE: 80 MMHG

## 2020-01-06 DIAGNOSIS — E87.6 HYPOKALEMIA: ICD-10-CM

## 2020-01-06 DIAGNOSIS — C71.9 ASTROCYTOMA BRAIN TUMOR (HCC): ICD-10-CM

## 2020-01-06 DIAGNOSIS — Z74.3 REQUIRES CONTINUOUS SUPERVISION FOR ACTIVITIES OF DAILY LIVING (ADL): ICD-10-CM

## 2020-01-06 DIAGNOSIS — G89.3 CANCER ASSOCIATED PAIN: ICD-10-CM

## 2020-01-06 DIAGNOSIS — M25.511 ACUTE PAIN OF RIGHT SHOULDER: Primary | ICD-10-CM

## 2020-01-06 PROCEDURE — 99214 OFFICE O/P EST MOD 30 MIN: CPT | Performed by: NURSE PRACTITIONER

## 2020-01-06 RX ORDER — ALPRAZOLAM 0.25 MG/1
0.25 TABLET ORAL 2 TIMES DAILY PRN
Qty: 60 TABLET | Refills: 0 | Status: SHIPPED | OUTPATIENT
Start: 2020-01-06 | End: 2020-04-24

## 2020-01-06 NOTE — PROGRESS NOTES
Chief Complaint   Patient presents with   • Shoulder Pain     Complaints of shoulder pain.   Would like referral to ortho.          Allergies   Allergen Reactions   • Lipitor [Atorvastatin] Rash   • Lisinopril Angioedema     Swell tongue       History provided by: self     HPI:  Subjective   Lukasz Paxton Savage is a 54 y.o. male presents today for follow up for chronic problems and severe shoulder pain.  Nishant has had a hard couple years.  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  The lesion was 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 He has completed his radiation treatments.  He struggles with left hand numbness and tingling in the left Upper extremity and lower extremities.  He sees neuro Galen Todd and Dr. Bennett, sees Dr. Romero and Dr. Jessica for his cancer.  He says it has been several months since last seizure.  His condition has changed everything in his life.  He is now disabled and has to live with his parents who care for him.  His mother comes to all the appts. Pt had been having problems with mental status changes however, that has improved. Today he is complaining of right shoulder pain that has gotten progressively worse over the last week. Denies any injury to R shoulder, says both shoulders are hurting and wants referral to Ortho. Below please see note from Dr. Jessica for the history of cancer:     HISTORY OF PRESENT ILLNESS  Diagnosed in April 2019 with Grade 3 right frontal lobe anaplastic Astrocytoma, 4.5 x 4.5 x 3.5 cm. Underwent right frontal craniotomy on April 16, 2019, lesion was 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  Treated with Temodar concurrent with radiation therapy to the brain, 6000 cGy on 07/30/2019.      07/03/2017 - CT Head without contrast:  · Bilateral occipital lobe hypodensity compatible with subacute ischemic change. MRI correlation  recommended.     07/03/2017 - MRI brain with and without contrast:  · Posterior reversible encephalopathy syndrome.   · Follow-up in 8 weeks is suggested.     07/08/2017 - CT head with and without contrast:  · Negative CT brain without and with contrast     08/29/2017 - MRI brain with and without contrast:  · Interval resolution of posterior cortical and subcortical FLAIR signal.  · No stroke, hemorrhage, or abnormal enhancement.  · No new or increased abnormality     04/02/2019 - MRI Brain with and without contrast:  · There is an approximately 4.5 x 4.5 x 3.5 cm focus of masslike FLAIR hyperintensity in the posterior right frontal lobe (series 401-image 23 and series 901-image 16).   · There is a second discrete FLAIR hyperintense nodule in the subcortical white matter of the paramedian posterior right frontal lobe immediately above the corpus callosum measuring 1.1 x 1.9 x 1.5 cm (series 401-image 22 and series 901-image 16).   · Notable diffusion signal hyperintensity in this second area with a faint signal hyperintensity in the larger area.   · ADC shows areas of low signal centrally within the larger area of FLAIR abnormality as well as diffusely throughout the smaller nodular hyperintensity (series 204-images 172 and 156).      04/08/2019 - Appointment with :  · Presents from his neurology office after left facial and upper extremity focal motor seizure. MRI was read as abnormal he is here to discuss the MRI results.  In July 2017 he was hospitalized for PRES syndrome. Presented as a seizure at that time and was intubated trached with an extended hospital stay and recovery.  He did very well however and fully recovered.    · In February 2019 he started to develop left facial twitching and left upper extremity twitching.  He has been started on Keppra 500 mg twice daily for just a few days.    · He has not seen any improvement in the focal motor seizures.    · He also relates some difficulty with  speech and focus and concentration.    Recommendations:  · We will get him preop and scheduled for a right stereotactic brain biopsy as soon as possible.     04/15/2019 - Craniotomy with biopsy per :  1-3.  Brain, right frontal, biopsy:  · Anaplastic Infiltrating Glioma (see comment)     04/15/2019 - CT Head without contrast:  · Status post right craniotomy with a tract of air extending to the medial right frontal lobe which is presumably related to a biopsy tract.  · Ill-defined hyperdense area in the medial right frontal lobe corresponds with the known mass seen on recent MRI exam.     05/09/2019 - Appointment with :  · Pathology is consistent with Grade 3 Anaplastic Astrocytoma.   · In my opinion this is not amenable to a maximal safe surgical resection I think any effective surgery that would give a survival benefit would likely severely disabled him.   · I am going to insist that he get a second opinion from the [facility] we will make a referral to Drew Gutierrez.   · We will also make a referral to our radiation oncology and our local oncologist for chemotherapeutic discussions.   · I did extensive discussion with the patient and his family. Their questions and concerns were patiently addressed. I will reach out to the [facility] to discuss the case with Dr. Gutierrez and we will get him set up for radiation oncology and oncology referrals.   ·  I will see him in follow-up in about 3 months with a repeat MRI of the brain     05/10/2019 - Presented to ER after falling in the bathroom earlier this morning.  Patient states he slipped and fell while getting out of the bathtub striking his back on the bathtub.  He denies syncope or chest pain before this event.  He states he just slipped and fell. He denies any other injury.      05/10/2019 - CT lumbar spine with and without contrast:  · No CT evidence of acute osseous abnormality.  · Multilevel disc degeneration and  spondylosis     05/17/2019 - Consult with :  · The patient and his family verbalize understanding of this discussion, voice no further questions and wish to proceed with recommended therapy of combined modality Temodar and radiation therapy for definitive treatment, anticipate a course of 9055-8145 cGy, final treatment plan to be determined  · We will perform CT simulation on May 20th (Monday) to initiate the treatment planning with the intent to begin treatments as soon as possible.      06/03/2019 - CT Head:  · Evidence of previous right craniotomy for biopsy of 2 right frontal masses which appear increased in size compared with the previous examinations, concerning for tumor progression.   · No intracranial hemorrhage or midline shift is identified.     06/03/2019 - MRI Brain:  · Increased size of right frontal lobe mass lesions compared to 04/02/2019, which are described in more detail above.     06/12/2019 - 07/30/2019 - Completed Radiation course:  · Received 6000 cGy in 30 fractions to brain via external beam radiation therapy.     06/14/2019 - CT Head:  · Stable CT appearance of the head compared to 06/03/2019, as described above.            06/17/2019 - MRI Brain:  · Approximately 3 cm enhancing partially necrotic tumor immediately above the right corpus callosum body. Associated FLAIR signal is noted immediately above it. Faint enhancement and FLAIR signal extending to the right precentral gyrus, consistent with additional tumor extension.     06/21/2019 - CT Head:  · The hyperdense foci of the right frontal lobe are similar to the 06/14/2019 CT head exam representing the peripherally enhancing centrally necrotic lesion with adjacent lateral enhancement in the right frontal lobe adjacent the corpus callosum on the MRI brain of 06/17/2019. No intracranial hemorrhage identified.     06/29/2019 - CT Head:  · No acute intracranial abnormality, there are ill-defined masses in the right frontal lobe  compatible the history of brain neoplasm, similar to the previous head CT. The largest measures about 3.2 cm and shows mild central necrosis.     07/15/2019 - CT Head:  · Stable appearance of the brain compared to the exam from 06/29/2019, with hyperdense masses in the right frontal lobe without associated midline shift. Right craniotomy defect is noted with a hyperdense area just deep to it, also stable from the previous exam. This may represent an additional mass. No acute intracranial findings are appreciated.     08/27/2019 - MRI Brain:  · Interval decreased size and mass effect of the solid enhancing nodule adjacent to the posterior body/splenium of the corpus callosum. This suggests a positive treatment response. The increasing FLAIR abnormality in this area is attributed to an early radiation induced change.  · The more superficial signal abnormality involving the posterior right frontal lobe is stable or perhaps slightly less prominent on FLAIR.   · No new enhancing nodule in this area to suggest disease progression.     08/27/2019 - Appointment with :  · The patient is recovering well from his difficulties with seizures in July.   ·  I do think he has some improvement still to make.    · I did extensive discussion with the radiologist about the MRI findings I think that the overall tumor burden has not increased in fact it may be a little better.    · I think the white matter changes that were seeing her edema/radiation effect.    · We are to put him on a dedicated course of steroids for about 9 days and we are in order outpatient physical therapy for strength gait and balance training.    · We will see him in follow-up in about 3 months with repeat imaging.     10/02/2019 - Appointment with :  · Return to Dr. Jessica in 3 months  · Continue to follow with Dr. Trevino  · Referral placed to Dr. Garrido in pain management.     11/26/2019 - MRI Brain:  · Decreased abnormal FLAIR signal in the  right frontal lobe.  · No new area of abnormal enhancement to suggest progression of disease.     11/21/2019 - Appointment with :  · Follow-up appointment given for 4 weeks  · Continue to follow-up with other medical providers as recommended  · Anticipating initiating physical therapy and evaluation by Dr. Garrido  · Follow-up with Dr. Trevino as scheduled     11/26/2019 - Appointment with :  · The patient seems to be tolerating his treatments well.    · His MRI is stable as far as the enhancing portion and improved to start the white matter changes.    · We will see him in follow-up in about 3 months     12/17/2019 - Appointment with      Chronic problems:  Gout stable with allopurinol, seizures stable with depakote and keppra, hyperlipidemia stable with omega 3 fatty acids, gerd stable with omeprazole, chronic nausea stable with ondansetron, anxiety stable with alprazolam, depression stable with venlafaxine, htn stable with losartan and metoprolol, chronic pain stable with oxycodone, and cancer as listed above       PCP currently listed as Linda Hoffman, DNP, APRN.     The following portions of the patient's history were reviewed and updated as appropriate: allergies, current medications, past family history, past medical history, past social history, past surgical history and problem list      Current Outpatient Medications:   •  allopurinol (ZYLOPRIM) 300 MG tablet, , Disp: , Rfl: 0  •  ALPRAZolam (XANAX) 0.25 MG tablet, Take 1 tablet by mouth 2 (Two) Times a Day As Needed for Anxiety., Disp: 60 tablet, Rfl: 0  •  aspirin 81 MG tablet, Take 1 tablet by mouth Daily., Disp: , Rfl:   •  divalproex (DEPAKOTE) 500 MG DR tablet, Take 2 tablets by mouth Every 8 (Eight) Hours., Disp: 180 tablet, Rfl: 3  •  lactulose 20 GM/30ML solution solution, Take 30 mL by mouth Daily. (Patient taking differently: Take 20 g by mouth As Needed.), Disp: 946 mL, Rfl: 1  •  levETIRAcetam (KEPPRA) 1000 MG  tablet, TAKE 1 TABLET BY MOUTH 2 (TWO) TIMES A DAY., Disp: 60 tablet, Rfl: 5  •  levETIRAcetam (KEPPRA) 250 MG tablet, TAKE 1 TABLET BY MOUTH 2 (TWO) TIMES A DAY., Disp: 60 tablet, Rfl: 5  •  losartan (COZAAR) 25 MG tablet, Take 1 tablet by mouth Daily., Disp: 30 tablet, Rfl: 2  •  metoprolol succinate XL (TOPROL XL) 25 MG 24 hr tablet, Take 1 tablet by mouth Daily., Disp: 30 tablet, Rfl: 6  •  Omega-3 Fatty Acids (FISH OIL) 1000 MG capsule capsule, TAKE TWO CAPSULES BY MOUTH EVERY MORNING WITH BREAKFAST, Disp: 180 capsule, Rfl: 1  •  omeprazole (prilOSEC) 10 MG capsule, TAKE ONE CAPSULE BY MOUTH DAILY FOR ACID REFLUX, Disp: 90 capsule, Rfl: 1  •  ondansetron (ZOFRAN) 4 MG tablet, , Disp: , Rfl: 2  •  oxyCODONE-acetaminophen (PERCOCET) 7.5-325 MG per tablet, Take 1 tablet by mouth Every 4 (Four) Hours As Needed for Moderate Pain  or Severe Pain ., Disp: 40 tablet, Rfl: 0  •  promethazine (PHENERGAN) 12.5 MG tablet, TAKE 1 TABLET BY MOUTH EVERY 4 (FOUR) HOURS AS NEEDED FOR NAUSEA OR VOMITING., Disp: 60 tablet, Rfl: 1  •  venlafaxine XR (EFFEXOR-XR) 150 MG 24 hr capsule, TAKE ONE CAPSULE BY MOUTH DAILY, Disp: 90 capsule, Rfl: 1  •  VIMPAT 150 MG tablet, TAKE ONE TABLET BY MOUTH TWICE A DAY, Disp: 60 tablet, Rfl: 2  •  vitamin B-6 (PYRIDOXINE) 100 MG tablet, TAKE 1 TABLET BY MOUTH DAILY., Disp: 30 tablet, Rfl: 5  Past Medical History:   Diagnosis Date   • Anemia 6/17/2019   • Angio-edema 7/3/2017   • Anxiety    • Arthritis    • Cancer (CMS/HCC) 05/09/2019    Brain cancer diagnoised yesterday  Dr Trevino    • Clostridium difficile colitis    • Erectile dysfunction 10/6/2016   • GERD (gastroesophageal reflux disease)    • Gout    • HCAP (healthcare-associated pneumonia) 7/11/2017   • Hyperlipidemia    • Malignant hypertension    • MSSA (methicillin susceptible Staphylococcus aureus) infection 7/31/2017   • Obstructive sleep apnea    • S/P shoulder surgery 7/27/2018   • Seizures (CMS/HCC) 7/4/2017     Past Surgical  History:   Procedure Laterality Date   • COLONOSCOPY     • CRANIOTOMY Right 4/15/2019    Procedure: CRANIOTOMY WITH BIOPSY RIGHT;  Surgeon: Dillon Trevino MD;  Location: [facility] OR;  Service: Neurosurgery   • SHOULDER ARTHROSCOPY Left    • TRACHEOSTOMY N/A 2017    Procedure: TRACHEOSTOMY WITH TRACHEOSCOPY;  Surgeon: Jairon Pierson MD;  Location: [facility] OR;  Service:      Social History     Socioeconomic History   • Marital status: Single     Spouse name: Not on file   • Number of children: 2   • Years of education: Not on file   • Highest education level: Not on file   Occupational History   • Occupation: ELECTRIAL WORK   Tobacco Use   • Smoking status: Former Smoker     Packs/day: 0.33     Years: 30.00     Pack years: 9.90     Types: Cigarettes     Last attempt to quit: 7/3/2017     Years since quittin.5   • Smokeless tobacco: Never Used   Substance and Sexual Activity   • Alcohol use: No     Frequency: Never     Comment: used to drink alot but now just ocasional beer since has seizure in 2017   • Drug use: No   • Sexual activity: Defer     Family History   Problem Relation Age of Onset   • Hypertension Mother    • Diabetes Mother    • Heart disease Father    • Hypertension Father    • No Known Problems Daughter    • No Known Problems Son    • Diabetes Maternal Grandmother    • No Known Problems Maternal Grandfather    • No Known Problems Paternal Grandmother    • Heart attack Paternal Grandfather    • Kidney disease Sister        REVIEW OF SYMPTOMS: (Positives bolded)  General:  weight loss, fever, chills, night sweats, fatigue, appetite loss  HEENT:  blurry vision, eye pain, eye discharge, dry eyes, decreased vision  Respiratory: shortness of breath, cough, hemoptysis, wheezing, pleurisy,   Cardiovascular:  chest pain, PND, palpitation, edema, orthopnea, syncope  Gastro: Nausea, vomiting, diarrhea, hematemesis, abdominal pain, constipation  Genito: hematuria, dysuria, glycosuria, hesitancy, frequency,  "incontinence  Musckelo: Arthralgia, myalgia, muscle weakness, joint swelling, NSAID use  Skin: rash, pruritis, sores, nail changes, skin thickening, change in wart/mole, itching   Neuro:  Migraine, numbness, ataxia, tremor, vertigo, weakness, memory loss  \"All other systems reviewed and negative, except as listed above.”    OBJECTIVE:  Constitutional:  No acute distress, Consistent with stated age. Oriented x 3,  Gait normal. Patient is pleasant and cooperative with the interview and exam.    Integumentary: No rashes, ulcers or lesions. No edema.  Normal skin moisture/turgor. Skin is warm to touch, no increased warmth.      Eye: Bilaterally PERRLA, EOMI.   Upper and lower eyelids are normal. Sclera/conjunctiva normal without discharge. Cornea is normal and clear. Lens is normal.  Eyeball appears normal.     ENMT:  Canals  normal without erythema or discharge, no excessive cerumen. Tympanic membranes Grey/pearly, normal light reflex and anatomy. Hearing normal to conversational speech at 2-5 feet. Nares airflow, no discharge. Dentition assessed and discussed appropriate oral care. Tongue normal midline.  no pharyngeal erythema, Uvula midline. No post nasal drip.     CHEST/LUNG: Lungs clear throughout lung fields without rale, rhonchi or wheezes. no distress, no use of accessory muscles.       CARDIOVASCULAR:  Regular rate and rhythm. No murmur noted in sitting, supine positions.      ABDOMEN:  Bowel sounds normal, no abdominal bruits. Abd soft, non-tender, no rebound tenderness, no rigidity (guarding), no jar tenderness, no masses.  no hepatomegaly, no splenomegaly     Neuropsych: Normal speech, Thought- normal. No hallucinations, delusions, obsessions.   Appropriate judgement and memory.    Musculoskeletal:  digital clubbing or cyanosis, neurovascularly intact all four extremities.  R Upper extremity- Symmetrical posture.  No visible deformity.  Normal sensation along medial and lateral upper extremity proximally " "and distally. Left upper extremity weakness distal greater than proximal.  Some ataxia with the left upper and lower extremity.  Right shoulder has tenderness on palpation of the whole shoulder and under shoulder blade.  Has decreased rom, can not cross over, can not put arm behind back, has weakness.   weak 4/5 strength 4/5 bilaterally.  Has difficulty with pronation and supination.  .  Elbow palpated, no tenderness overlying olecranon.  Bicep insertion/tricep insertion appear normal without obvious pathology. Rotator cuff evaluated and intact.  Normal wrist ROM bilaterally. Normal hand movement, intrinsic muscles of hands normal. No tenderness to palpation of hands/wrists/elbows.  Lower extremity  Knee ROM normal at 0-120 degrees. No tenderness overlying trochanters, no tenderness about patella, quad tendon, patellar tendon. No tenderness at tibial tuberosity. Ankle normal ROM not tender to palpation along medial/lateral malleolus. Normal movement of toes, no tenderness bilateral feet/toes. Normal foot type. Calves symmetrical. Stretching demonstrated today  Cervical: Normal ROM with turning head right to left and touching chin to chest. Has no tenderness on palpation of the cervical spine  Lumbar Spine:  Normal ROM with bending over, twisting right and left.  No tenderness on palpation of the lumbar spine or sciatic notch.  Straight leg raises normal bilaterally. Can heel/toe walk.  Inversion/eversion normal   /80 (BP Location: Left arm, Patient Position: Sitting, Cuff Size: Large Adult)   Pulse 86   Temp 98.1 °F (36.7 °C) (Oral)   Resp 18   Ht 172.7 cm (67.99\")   Wt 87.8 kg (193 lb 9.6 oz)   SpO2 98%   BMI 29.44 kg/m²     ASSESSMENT  Lukasz was seen today for shoulder pain.    Diagnoses and all orders for this visit:    Acute pain of right shoulder  -     Ambulatory Referral to Orthopedic Surgery    Hypokalemia  -     ALPRAZolam (XANAX) 0.25 MG tablet; Take 1 tablet by mouth 2 (Two) Times a " Day As Needed for Anxiety.    Astrocytoma brain tumor (CMS/HCC)  Cancer associated pain         -   Continue treatment as prescribed by Dr. Bennett, Dr. Hernandez, and Dr. Romero    Requires continuous supervision for activities of daily living (ADL)      [facility] Report:     As part of this patient's treatment plan, I am prescribing controlled substances. The patient has been made aware of appropriate use of such medications, including potential risk of opiod use, somnolence, limited ability to drive and /or work safely, and potential for dependence or overdose, and opiods should be used sparingly and are not recommended for long term use.  It has also been made clear that these medications are for use by this patient only, without concomitant use of alcohol or other substances unless prescribed.     Patient has completed prescribing agreement detailing terms of continued prescribing of controlled substances, including monitoring [facility] reports, urine drug screening yearly an random drug screens to monitor patients compliance to treatment plan, and pill counts if necessary. The patient is aware that inappropriate use will result in cessation of prescribing such medications.    Toxassure:  I have ordered a urine drug screen to monitor patients predisposition to and patterns of drug use/misuse in order to establish and maintain the safe and effective use of analgesics in the treatment of chronic pain      [facility] report has been reviewed by: Linda Hoffman, DNP, APRN.   The report was scanned into the patient's chart.      -     Pt is aware of the potential for addiction and dependence.    Addiction was discussed.  The  Risks, benefits and alternative options of drug therapy discussed.  It was reinforced about the risks and benefits of opioids.  It was reinforced that patient may not call early for medication, may not ask for increase in the number of pills given monthly or increase in dosage of medication, may not  jump around to different pharmacies or providers and may not receive any narcotics from other providers including ER, or the contract will be broken and narcotics will no longer be prescribed from this facility if any of these criteria has been broken. Last Dose was:  Last dose: today  Last Fill was:  Dec  Date of last KVNG: did not pull kvng I do not write for the oxycodone      R/B/A of medications/treatment options d/w  the pt/family today. The patient/family are aware and accept that medications can have side effects.  If there any obvious side effects they should call or return to clinic as soon as possible.  Appropriate f/u discussed for topics addressed today. All questions were answered to the satisfactory state of patient/family.  Should symptoms fail to improve or worsen they agree to call or return to clinic or to go to the ER. Education handouts were offered on any new Rx if requested.  Discussed the importance of f/u with any needed screening tests/labs/specialist appointments and any requested follow-up recommended by me today.  Importance of maintaining f/u discussed and patient accepts that missed appointments can delay diagnosis and potentially lead to worsening of conditions.    Using medications as prescribed.  Discussed need to take regularly as prescribed, to avoid missing doses as able.  Medications reviewed with patient today. We discussed cessation/continuation of medications for current problem.  Needed Rx refills will be provided.  No side effects from medications.       Return in about 1 month (around 2/6/2020) for Recheck chronic.    Electronically signed by Linda Hoffman, SUSHMA, APRN, 01/06/20, 11:57 AM.

## 2020-01-06 NOTE — PATIENT INSTRUCTIONS
Shoulder Pain  Many things can cause shoulder pain, including:  · An injury.  · Moving the shoulder in the same way again and again (overuse).  · Joint pain (arthritis).  Pain can come from:  · Swelling and irritation (inflammation) of any part of the shoulder.  · An injury to the shoulder joint.  · An injury to:  ? Tissues that connect muscle to bone (tendons).  ? Tissues that connect bones to each other (ligaments).  ? Bones.  Follow these instructions at home:  Watch for changes in your symptoms. Let your doctor know about them. Follow these instructions to help with your pain.  If you have a sling:  · Wear the sling as told by your doctor. Remove it only as told by your doctor.  · Loosen the sling if your fingers:  ? Tingle.  ? Become numb.  ? Turn cold and blue.  · Keep the sling clean.  · If the sling is not waterproof:  ? Do not let it get wet.  ? Take the sling off when you shower or bathe.  Managing pain, stiffness, and swelling    · If told, put ice on the painful area:  ? Put ice in a plastic bag.  ? Place a towel between your skin and the bag.  ? Leave the ice on for 20 minutes, 2-3 times a day. Stop putting ice on if it does not help with the pain.  · Squeeze a soft ball or a foam pad as much as possible. This prevents swelling in the shoulder. It also helps to strengthen the arm.  General instructions  · Take over-the-counter and prescription medicines only as told by your doctor.  · Keep all follow-up visits as told by your doctor. This is important.  Contact a doctor if:  · Your pain gets worse.  · Medicine does not help your pain.  · You have new pain in your arm, hand, or fingers.  Get help right away if:  · Your arm, hand, or fingers:  ? Tingle.  ? Are numb.  ? Are swollen.  ? Are painful.  ? Turn white or blue.  Summary  · Shoulder pain can be caused by many things. These include injury, moving the shoulder in the same away again and again, and joint pain.  · Watch for changes in your symptoms.  Let your doctor know about them.  · This condition may be treated with a sling, ice, and pain medicine.  · Contact your doctor if the pain gets worse or you have new pain. Get help right away if your arm, hand, or fingers tingle or get numb, swollen, or painful.  · Keep all follow-up visits as told by your doctor. This is important.  This information is not intended to replace advice given to you by your health care provider. Make sure you discuss any questions you have with your health care provider.  Document Released: 06/05/2009 Document Revised: 07/02/2019 Document Reviewed: 07/02/2019  [employer] Interactive Patient Education © 2019 [employer]

## 2020-01-10 RX ORDER — TEMOZOLOMIDE 100 MG/1
CAPSULE ORAL
Qty: 15 CAPSULE | Refills: 4 | Status: SHIPPED | OUTPATIENT
Start: 2020-01-10 | End: 2020-07-02

## 2020-01-13 PROBLEM — M25.562 CHRONIC PAIN OF BOTH KNEES: Status: ACTIVE | Noted: 2019-11-21

## 2020-01-13 PROBLEM — M25.561 CHRONIC PAIN OF BOTH KNEES: Status: ACTIVE | Noted: 2019-11-21

## 2020-01-13 PROBLEM — D69.59 THROMBOCYTOPENIA DUE TO DRUGS: Status: ACTIVE | Noted: 2019-09-06

## 2020-01-13 PROBLEM — T45.1X5A ANEMIA ASSOCIATED WITH CHEMOTHERAPY: Status: ACTIVE | Noted: 2019-06-17

## 2020-01-13 PROBLEM — G89.29 CHRONIC PAIN OF BOTH KNEES: Status: ACTIVE | Noted: 2019-11-21

## 2020-01-13 PROBLEM — K71.6 DRUG-INDUCED HEPATIC TOXICITY: Status: ACTIVE | Noted: 2019-09-06

## 2020-01-13 PROBLEM — T50.905A DRUG-INDUCED HEPATIC TOXICITY: Status: ACTIVE | Noted: 2019-09-06

## 2020-01-13 PROBLEM — G89.3 CANCER ASSOCIATED PAIN: Status: ACTIVE | Noted: 2019-11-21

## 2020-01-13 PROBLEM — R53.83 FATIGUE DUE TO TREATMENT: Status: ACTIVE | Noted: 2019-09-06

## 2020-01-13 PROBLEM — Z74.3 REQUIRES CONTINUOUS SUPERVISION FOR ACTIVITIES OF DAILY LIVING (ADL): Status: ACTIVE | Noted: 2019-09-06

## 2020-01-13 PROBLEM — T50.905A THROMBOCYTOPENIA DUE TO DRUGS: Status: ACTIVE | Noted: 2019-09-06

## 2020-01-13 PROBLEM — D64.81 ANEMIA ASSOCIATED WITH CHEMOTHERAPY: Status: ACTIVE | Noted: 2019-06-17

## 2020-01-16 DIAGNOSIS — F33.1 MODERATE EPISODE OF RECURRENT MAJOR DEPRESSIVE DISORDER (HCC): ICD-10-CM

## 2020-01-16 RX ORDER — VENLAFAXINE HYDROCHLORIDE 150 MG/1
CAPSULE, EXTENDED RELEASE ORAL
Qty: 90 CAPSULE | Refills: 1 | OUTPATIENT
Start: 2020-01-16

## 2020-01-17 ENCOUNTER — HOSPITAL ENCOUNTER (OUTPATIENT)
Dept: INFUSION THERAPY | Age: 55
Discharge: HOME | End: 2020-01-17
Payer: MEDICAID

## 2020-01-17 ENCOUNTER — OFFICE VISIT (OUTPATIENT)
Dept: HEMATOLOGY | Age: 55
End: 2020-01-17
Payer: MEDICAID

## 2020-01-17 VITALS
OXYGEN SATURATION: 97 % | WEIGHT: 196.4 LBS | DIASTOLIC BLOOD PRESSURE: 98 MMHG | HEIGHT: 68 IN | BODY MASS INDEX: 29.77 KG/M2 | SYSTOLIC BLOOD PRESSURE: 140 MMHG | HEART RATE: 82 BPM

## 2020-01-17 DIAGNOSIS — R56.9 SEIZURES (HCC): ICD-10-CM

## 2020-01-17 DIAGNOSIS — D64.81 ANEMIA ASSOCIATED WITH CHEMOTHERAPY: ICD-10-CM

## 2020-01-17 DIAGNOSIS — C71.9 ANAPLASTIC GLIOMA OF BRAIN (HCC): ICD-10-CM

## 2020-01-17 DIAGNOSIS — C71.9 ASTROCYTOMA (HCC): ICD-10-CM

## 2020-01-17 DIAGNOSIS — T45.1X5A ANEMIA ASSOCIATED WITH CHEMOTHERAPY: ICD-10-CM

## 2020-01-17 PROCEDURE — 80053 COMPREHEN METABOLIC PANEL: CPT

## 2020-01-17 PROCEDURE — 85025 COMPLETE CBC W/AUTO DIFF WBC: CPT

## 2020-01-17 PROCEDURE — 99213 OFFICE O/P EST LOW 20 MIN: CPT | Performed by: NURSE PRACTITIONER

## 2020-01-17 PROCEDURE — 99211 OFF/OP EST MAY X REQ PHY/QHP: CPT

## 2020-01-17 RX ORDER — OXYCODONE AND ACETAMINOPHEN 7.5; 325 MG/1; MG/1
1 TABLET ORAL EVERY 4 HOURS PRN
Qty: 120 TABLET | Refills: 0 | Status: SHIPPED | OUTPATIENT
Start: 2020-01-17 | End: 2020-02-14 | Stop reason: SDUPTHER

## 2020-01-17 ASSESSMENT — ENCOUNTER SYMPTOMS
CONSTIPATION: 0
DIARRHEA: 0
EYE PAIN: 0
SORE THROAT: 0
WHEEZING: 0
ABDOMINAL PAIN: 0
EYES NEGATIVE: 1
EYE DISCHARGE: 0
BLOOD IN STOOL: 0
SHORTNESS OF BREATH: 0
EYE REDNESS: 0
VOMITING: 0
COUGH: 0
RESPIRATORY NEGATIVE: 1

## 2020-01-17 NOTE — PROGRESS NOTES
follow-up with Dr. Raciel Martines at the end of February. He reports he follows up with Dr. Jd Gama with neurology on 2/27/2019. CBC today is within normal limits as documented below. CMP on 12/17/2020 was within normal limits. Army Rea will continue with Temodar as prescribed. 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 · 8/29/2019- anticipate initiating Temadar 150 mg/m2 days 1-5 on a 28 day regimen     Cancer history  Mr Davidson was seen in initial oncology consultation by Dr. James Garcia on 5/24/2019 referred by Dr. Raciel Martines for diagnosis of primary brain tumor, grade 3 astrocytoma. Army Rea was being seen by neurology with complaints of left facial and upper extremity. · 4/2/2019-MRI brain with contrast showed changes in the frontal convexity with a fullness of the sulcal gyral pattern. Specifically, 4.5 x 4.5 x 3.5 cm right frontal lesion. Second, discrete hyperintense nodule measuring 1.1 x 1.9 x 1.5 cm in the subcortical white matter of the paramedian posterior right frontal lobe immediately above the corpus callosum. This was concerning for high-grade glioma. · 4/15/2019-he underwent a craniotomy with stereotactic biopsy by Dr. Karly Mallory at [facility]. 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 Further molecular analysis at [facility] revealed IDH1/2 wild type, MGMT non-methylated, TERT mutation. Complex cytogenetics changes A maximum safe resection was not possible due to concerns of significant sequela. Second opinion was recommended at the [facility]. · 5/17/2019-he was seen by Dr. Sarthak Diaz. Recommended concurrent chemoradiation.    · 5/24/2019-initial oncology consultation, Dr. James Garcia (SEROQUEL) 50 MG tablet Take 50 mg by mouth nightly      vitamin B-6 (PYRIDOXINE) 50 MG tablet Take 100 mg by mouth daily      spironolactone (ALDACTONE) 25 MG tablet Take 25 mg by mouth daily      carvedilol (COREG) 25 MG tablet Take 25 mg by mouth 2 times daily (with meals)      omeprazole (PRILOSEC) 10 MG delayed release capsule Take 10 mg by mouth daily      losartan (COZAAR) 100 MG tablet Take 100 mg by mouth daily      fenofibrate (TRICOR) 145 MG tablet Take 145 mg by mouth nightly      venlafaxine (EFFEXOR XR) 75 MG extended release capsule Take 150 mg by mouth daily       oxyCODONE-acetaminophen (PERCOCET) 7.5-325 MG per tablet Take 1 tablet by mouth every 4 hours as needed for Pain for up to 30 days. 120 tablet 0    diazepam (DIASTAT) 10 MG GEL Place 10 mg rectally once as needed (seizure) for up to 1 dose. 15 each 3     No current facility-administered medications for this visit. Allergies: Allergies   Allergen Reactions    Lisinopril Swelling     BRAIN SWELLING/COMA    Lipitor [Atorvastatin] Rash       Social History:    Social History     Tobacco Use    Smoking status: Former Smoker     Packs/day: 0.50     Years: 20.00     Pack years: 10.00     Types: Cigarettes     Last attempt to quit: 2015     Years since quittin.5    Smokeless tobacco: Current User   Substance Use Topics    Alcohol use: Yes     Comment: OCC    Drug use: No       Family History:   No family history on file. Vitals:  Vitals:    20 1213   BP: (!) 140/98   Pulse: 82   SpO2: 97%   Weight: 196 lb 6.4 oz (89.1 kg)   Height: 5' 8\" (1.727 m)        Subjective   REVIEW OF SYSTEMS:   Review of Systems   Constitutional: Positive for fatigue. Negative for chills, diaphoresis and fever. HENT: Negative. Negative for congestion, ear pain, hearing loss, nosebleeds, sore throat and tinnitus. Eyes: Negative. Negative for pain, discharge and redness. Respiratory: Negative.   Negative for cough, shortness of breath and wheezing. Cardiovascular: Negative. Negative for chest pain, palpitations and leg swelling. Gastrointestinal: Positive for nausea. Negative for abdominal pain, blood in stool, constipation, diarrhea and vomiting. Endocrine: Negative for polydipsia. Genitourinary: Negative for dysuria, flank pain, frequency, hematuria and urgency. Musculoskeletal: Positive for arthralgias and myalgias. Negative for back pain and neck pain. Bilateral knee pain   Skin: Negative. Negative for rash. Neurological: Positive for speech difficulty, weakness and headaches. Negative for dizziness, tremors and seizures. Hematological: Does not bruise/bleed easily. Psychiatric/Behavioral: Negative. The patient is not nervous/anxious. Objective   PHYSICAL EXAM:  Physical Exam  Vitals signs reviewed. Constitutional:       General: He is not in acute distress. Appearance: He is well-developed. He is not diaphoretic. HENT:      Head: Normocephalic and atraumatic. Mouth/Throat:      Pharynx: Uvula midline. Tonsils: No tonsillar exudate. Eyes:      General: Lids are normal. No scleral icterus. Right eye: No discharge. Left eye: No discharge. Conjunctiva/sclera: Conjunctivae normal.      Pupils: Pupils are equal, round, and reactive to light. Neck:      Musculoskeletal: Normal range of motion and neck supple. Thyroid: No thyroid mass or thyromegaly. Vascular: No JVD. Trachea: Trachea normal. No tracheal deviation. Cardiovascular:      Rate and Rhythm: Normal rate and regular rhythm. Heart sounds: Normal heart sounds. No murmur. No friction rub. No gallop. Pulmonary:      Effort: Pulmonary effort is normal. No respiratory distress. Breath sounds: Normal breath sounds. No wheezing or rales. Chest:      Chest wall: No tenderness. Abdominal:      General: Bowel sounds are normal. There is no distension.       Palpations: hours.   Urine Culture Recent : No results for input(s): LABURIN in the last 720 hours. Organism Recent : No results for input(s): ORG in the last 720 hours. ASSESSMENT/PLAN:      1. Anaplastic glioma, unresectable MGMT un-methylated. Status post combined modality therapy with radiation and Temodar. MRI of the brain on 11/26/2019 continues to suggest a positive response to treatment. Presently receiving Temodar 150 mg/m² days 1-5 on a 28-day regimen. Anticipate continuing with Temodar until intolerance or progression of disease.  - Continue Temodar days 1 through 5, 28-day regimen  - Anticipating repeat MRI of the brain in 3 months along with follow-up with Dr. Sathya Georges  - CBC Auto Differential; Future  - Comprehensive Metabolic Panel; Future    CMP on 12/17/2019 within normal limits without evidence of hepatic toxicity    2. Fatigue due to treatment and disease process. Encouraged to increase in activity as tolerated. -Encouraged to initiate physical therapy  - Monitor closely for changes      3. Chronic pain of both knees, continue physical therapy  -Continue to follow with Dr. Олег Anand at orthopedic clinic for injections     4. Anemia associated with chemotherapy. Hemoglobin normalized, hemoglobin 14.1 with an MCV of 95.3    5. Requires continuous supervision for activities of daily living (ADL) secondary to disease process and seizure activity. Continues to have ongoing left-sided and bilateral lower extremity weakness, decreased coordination and gait problems. Seizure activity is currently controlled with anticonvulsants managed by Dr. Mer White. 6.  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 Follow-up appointment given for 4 weeks  2. Continue to follow-up with other medical providers as recommended  3. Follow-up with Dr. Олег Anand related to knee pain.   4. Follow-up with Dr. Sathya Georges as

## 2020-01-19 PROBLEM — C71.9: Status: ACTIVE | Noted: 2020-01-19

## 2020-01-19 ASSESSMENT — ENCOUNTER SYMPTOMS
NAUSEA: 1
BACK PAIN: 0

## 2020-01-24 ENCOUNTER — OFFICE VISIT (OUTPATIENT)
Dept: FAMILY MEDICINE CLINIC | Facility: CLINIC | Age: 55
End: 2020-01-24

## 2020-01-24 VITALS
SYSTOLIC BLOOD PRESSURE: 110 MMHG | HEART RATE: 86 BPM | TEMPERATURE: 98.1 F | DIASTOLIC BLOOD PRESSURE: 86 MMHG | OXYGEN SATURATION: 97 % | BODY MASS INDEX: 29.55 KG/M2 | RESPIRATION RATE: 18 BRPM | WEIGHT: 195 LBS | HEIGHT: 68 IN

## 2020-01-24 DIAGNOSIS — T14.8XXA BRUISING: Primary | ICD-10-CM

## 2020-01-24 DIAGNOSIS — I10 ESSENTIAL HYPERTENSION: ICD-10-CM

## 2020-01-24 PROCEDURE — 99213 OFFICE O/P EST LOW 20 MIN: CPT | Performed by: FAMILY MEDICINE

## 2020-01-24 RX ORDER — TEMOZOLOMIDE 100 MG/1
CAPSULE ORAL
COMMUNITY
Start: 2020-01-08

## 2020-01-24 RX ORDER — METOPROLOL SUCCINATE 25 MG/1
25 TABLET, EXTENDED RELEASE ORAL DAILY
Qty: 30 TABLET | Refills: 6 | Status: SHIPPED | OUTPATIENT
Start: 2020-01-24 | End: 2020-07-30

## 2020-01-24 RX ORDER — LOSARTAN POTASSIUM 25 MG/1
50 TABLET ORAL DAILY
Qty: 180 TABLET | Refills: 1 | Status: SHIPPED | OUTPATIENT
Start: 2020-01-24 | End: 2020-05-13 | Stop reason: SDUPTHER

## 2020-02-03 RX ORDER — LACOSAMIDE 150 MG/1
TABLET, FILM COATED ORAL
Qty: 60 TABLET | Refills: 2 | OUTPATIENT
Start: 2020-02-03 | End: 2020-03-25

## 2020-02-13 DIAGNOSIS — D49.6 BRAIN NEOPLASM (HCC): ICD-10-CM

## 2020-02-13 RX ORDER — MULTIVITAMIN WITH IRON
100 TABLET ORAL DAILY
Qty: 30 TABLET | Refills: 5 | Status: SHIPPED | OUTPATIENT
Start: 2020-02-13 | End: 2020-08-12

## 2020-02-14 ENCOUNTER — OFFICE VISIT (OUTPATIENT)
Dept: HEMATOLOGY | Age: 55
End: 2020-02-14
Payer: MEDICAID

## 2020-02-14 ENCOUNTER — HOSPITAL ENCOUNTER (OUTPATIENT)
Dept: INFUSION THERAPY | Age: 55
Discharge: HOME | End: 2020-02-14
Payer: MEDICAID

## 2020-02-14 VITALS
DIASTOLIC BLOOD PRESSURE: 98 MMHG | HEART RATE: 86 BPM | OXYGEN SATURATION: 97 % | BODY MASS INDEX: 30.86 KG/M2 | SYSTOLIC BLOOD PRESSURE: 138 MMHG | HEIGHT: 68 IN | WEIGHT: 203.6 LBS

## 2020-02-14 DIAGNOSIS — C71.9 ANAPLASTIC GLIOMA OF BRAIN (HCC): ICD-10-CM

## 2020-02-14 DIAGNOSIS — D64.81 ANEMIA ASSOCIATED WITH CHEMOTHERAPY: ICD-10-CM

## 2020-02-14 DIAGNOSIS — C71.9 ASTROCYTOMA (HCC): ICD-10-CM

## 2020-02-14 DIAGNOSIS — T45.1X5A ANEMIA ASSOCIATED WITH CHEMOTHERAPY: ICD-10-CM

## 2020-02-14 PROCEDURE — 85025 COMPLETE CBC W/AUTO DIFF WBC: CPT

## 2020-02-14 PROCEDURE — 99211 OFF/OP EST MAY X REQ PHY/QHP: CPT

## 2020-02-14 PROCEDURE — 99213 OFFICE O/P EST LOW 20 MIN: CPT | Performed by: NURSE PRACTITIONER

## 2020-02-14 RX ORDER — COLCHICINE 0.6 MG/1
TABLET ORAL
COMMUNITY
End: 2020-03-13

## 2020-02-14 RX ORDER — DIVALPROEX SODIUM 500 MG/1
500 TABLET, DELAYED RELEASE ORAL 3 TIMES DAILY
COMMUNITY

## 2020-02-14 RX ORDER — TEMOZOLOMIDE 100 MG/1
CAPSULE ORAL
COMMUNITY
Start: 2020-01-08 | End: 2020-03-13

## 2020-02-14 RX ORDER — OXYCODONE AND ACETAMINOPHEN 7.5; 325 MG/1; MG/1
TABLET ORAL
COMMUNITY
End: 2020-06-12

## 2020-02-14 RX ORDER — ALPRAZOLAM 0.25 MG/1
TABLET ORAL
COMMUNITY
Start: 2020-01-06 | End: 2020-03-13

## 2020-02-14 RX ORDER — OXYCODONE AND ACETAMINOPHEN 7.5; 325 MG/1; MG/1
1 TABLET ORAL EVERY 4 HOURS PRN
Qty: 120 TABLET | Refills: 0 | Status: SHIPPED | OUTPATIENT
Start: 2020-02-14 | End: 2020-03-13

## 2020-02-14 RX ORDER — MULTIVITAMIN WITH IRON
TABLET ORAL
COMMUNITY
Start: 2020-01-02

## 2020-02-14 RX ORDER — LOSARTAN POTASSIUM 25 MG/1
TABLET ORAL
COMMUNITY
Start: 2020-01-24 | End: 2020-02-14 | Stop reason: ALTCHOICE

## 2020-02-14 RX ORDER — LEVETIRACETAM 1000 MG/1
1000 TABLET ORAL
COMMUNITY
Start: 2019-12-30 | End: 2020-03-13

## 2020-02-14 RX ORDER — ALLOPURINOL 300 MG/1
300 TABLET ORAL
COMMUNITY
Start: 2019-09-21 | End: 2020-03-13

## 2020-02-14 RX ORDER — CHLORAL HYDRATE 500 MG
CAPSULE ORAL
COMMUNITY

## 2020-02-14 RX ORDER — OMEPRAZOLE 10 MG/1
CAPSULE, DELAYED RELEASE ORAL
COMMUNITY
End: 2020-03-13

## 2020-02-14 RX ORDER — LACOSAMIDE 150 MG/1
TABLET, FILM COATED ORAL
COMMUNITY
Start: 2020-01-30 | End: 2020-03-13

## 2020-02-14 RX ORDER — MORPHINE SULFATE 15 MG/1
15 TABLET, FILM COATED, EXTENDED RELEASE ORAL 2 TIMES DAILY
Qty: 60 TABLET | Refills: 0 | Status: SHIPPED | OUTPATIENT
Start: 2020-02-14 | End: 2020-03-13

## 2020-02-14 RX ORDER — VENLAFAXINE HYDROCHLORIDE 150 MG/1
CAPSULE, EXTENDED RELEASE ORAL
COMMUNITY

## 2020-02-14 RX ORDER — 1.1% SODIUM FLUORIDE PRESCRIPTION DENTAL CREAM 5 MG/G
CREAM DENTAL
COMMUNITY
Start: 2019-12-21 | End: 2020-03-13

## 2020-02-14 RX ORDER — LEVETIRACETAM 1000 MG/1
1000 TABLET ORAL 2 TIMES DAILY
COMMUNITY

## 2020-02-14 RX ORDER — PROMETHAZINE HYDROCHLORIDE 12.5 MG/1
TABLET ORAL
COMMUNITY
End: 2020-10-09 | Stop reason: SDUPTHER

## 2020-02-14 RX ORDER — METOPROLOL SUCCINATE 25 MG/1
100 TABLET, EXTENDED RELEASE ORAL
COMMUNITY
Start: 2020-01-24

## 2020-02-14 RX ORDER — ASPIRIN 81 MG/1
TABLET ORAL
COMMUNITY

## 2020-02-14 ASSESSMENT — ENCOUNTER SYMPTOMS
ABDOMINAL PAIN: 0
COUGH: 0
WHEEZING: 0
DIARRHEA: 0
EYE DISCHARGE: 0
EYE REDNESS: 0
VOMITING: 0
EYE PAIN: 0
NAUSEA: 1
BLOOD IN STOOL: 0
EYES NEGATIVE: 1
SHORTNESS OF BREATH: 0
BACK PAIN: 0
CONSTIPATION: 0
RESPIRATORY NEGATIVE: 1
SORE THROAT: 0

## 2020-02-14 NOTE — PROGRESS NOTES
Anju Bahena. His last MRI of the brain 11/26/2019 documented decrease abnormal flair signal in the right frontal lobe with no new area of abnormal enhancement to suggest progression of disease. He has a follow-up appointment with Dr. Kaleigh Gonzalez with neurology on 2/27/2019. CBC today remains within normal limits. CMP on 1/17/2020 was within normal limits. Prashanth Singleton also reported having a lesion on his left tongue, upon evaluation it appears to be a blister. Prashanth Singleton thinks that it is from where he might have bit his tongue. He is anticipating an appointment with ENT for further evaluation. 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 · 8/29/2019- anticipate initiating Temadar 150 mg/m2 days 1-5 on a 28 day regimen     Cancer history  Mr Mustapha Santos was seen in initial oncology consultation by Dr. Meghann Belle on 5/24/2019 referred by Dr. Anju Bahena for diagnosis of primary brain tumor, grade 3 astrocytoma. Prashanth Singleton was being seen by neurology with complaints of left facial and upper extremity. · 4/2/2019-MRI brain with contrast showed changes in the frontal convexity with a fullness of the sulcal gyral pattern. Specifically, 4.5 x 4.5 x 3.5 cm right frontal lesion. Second, discrete hyperintense nodule measuring 1.1 x 1.9 x 1.5 cm in the subcortical white matter of the paramedian posterior right frontal lobe immediately above the corpus callosum. This was concerning for high-grade glioma. · 4/15/2019-he underwent a craniotomy with stereotactic biopsy by Dr. Lisa Matta at [facility]. 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 Further molecular analysis at [facility] revealed IDH1/2 wild type, MGMT non-methylated, TERT mutation. 12.5 MG tablet promethazine 12.5 mg tablet   one po q4h      vitamin B-6 (PYRIDOXINE) 100 MG tablet       temozolomide (TEMODAR) 100 MG chemo capsule Takes 3 capsules for 5 days, off 23 days.  venlafaxine (EFFEXOR XR) 150 MG extended release capsule venlafaxine  mg capsule,extended release 24 hr      temozolomide (TEMODAR) 100 MG chemo capsule TAKE 3 CAPSULES (300MG) BY MOUTH ONCE A DAY ON DAYS 1-5 OF EACH 28 DAYCYCLE. 15 capsule 4    lactulose 20 GM/30ML SOLN Take by mouth as needed      ondansetron (ZOFRAN) 4 MG tablet TAKE 2 TABLETS BY MOUTH EVERY 8 HOURS AS NEEDED FOR NAUSEA OR VOMITING 30 tablet 2    azithromycin (ZITHROMAX) 250 MG tablet Take 2 tablets the first day, then 1 tablet daily for 4 days.  levETIRAcetam (KEPPRA) 1000 MG tablet 2 times daily   5    metoprolol succinate (TOPROL XL) 25 MG extended release tablet Take 25 mg by mouth      promethazine (PHENERGAN) 12.5 MG tablet   1    LYRICA 100 MG capsule   0    allopurinol (ZYLOPRIM) 300 MG tablet Take 300 mg by mouth daily      ALPRAZolam (XANAX) 0.25 MG tablet Take 0.25 mg by mouth 2 times daily as needed for Sleep.  aspirin 81 MG tablet Take 81 mg by mouth daily      dexamethasone (DECADRON) 2 MG tablet Take 2 mg by mouth 2 times daily (with meals) Starting 6/21 take 3 tabs by mouth 2 times a day for 3 days, then 1.5 tabs 2 times a day for 10 days      divalproex (DEPAKOTE) 500 MG DR tablet Take 1,500 mg by mouth 3 times daily      Omega-3 Fatty Acids (FISH OIL) 1000 MG CAPS Take 2,000 mg by mouth daily (with breakfast)      lacosamide (VIMPAT) 50 MG TABS tablet Take 150 mg by mouth every 12 hours.       QUEtiapine (SEROQUEL) 50 MG tablet Take 50 mg by mouth nightly      vitamin B-6 (PYRIDOXINE) 50 MG tablet Take 100 mg by mouth daily      spironolactone (ALDACTONE) 25 MG tablet Take 25 mg by mouth daily      carvedilol (COREG) 25 MG tablet Take 25 mg by mouth 2 times daily (with meals)      omeprazole (PRILOSEC) 10 MG delayed release capsule Take 10 mg by mouth daily      losartan (COZAAR) 100 MG tablet Take 50 mg by mouth daily       fenofibrate (TRICOR) 145 MG tablet Take 145 mg by mouth nightly      venlafaxine (EFFEXOR XR) 75 MG extended release capsule Take 150 mg by mouth daily       morphine (MS CONTIN) 15 MG extended release tablet Take 1 tablet by mouth 2 times daily for 30 days. 60 tablet 0    oxyCODONE-acetaminophen (PERCOCET) 7.5-325 MG per tablet Take 1 tablet by mouth every 4 hours as needed for Pain for up to 30 days. 120 tablet 0    diazepam (DIASTAT) 10 MG GEL Place 10 mg rectally once as needed (seizure) for up to 1 dose. 15 each 3     No current facility-administered medications for this visit. Allergies: Allergies   Allergen Reactions    Lisinopril Swelling     BRAIN SWELLING/COMA    Lipitor [Atorvastatin] Rash       Social History:    Social History     Tobacco Use    Smoking status: Former Smoker     Packs/day: 0.50     Years: 20.00     Pack years: 10.00     Types: Cigarettes     Last attempt to quit: 2015     Years since quittin.6    Smokeless tobacco: Current User   Substance Use Topics    Alcohol use: Yes     Comment: OCC    Drug use: No       Family History:   No family history on file. Vitals:  Vitals:    20 1125   BP: (!) 138/98   Pulse: 86   SpO2: 97%   Weight: 203 lb 9.6 oz (92.4 kg)   Height: 5' 8\" (1.727 m)        Subjective   REVIEW OF SYSTEMS:   Review of Systems   Constitutional: Positive for fatigue. Negative for chills, diaphoresis and fever. HENT: Negative. Negative for congestion, ear pain, hearing loss, nosebleeds, sore throat and tinnitus. Eyes: Negative. Negative for pain, discharge and redness. Respiratory: Negative. Negative for cough, shortness of breath and wheezing. Cardiovascular: Negative. Negative for chest pain, palpitations and leg swelling. Gastrointestinal: Positive for nausea.  Negative for abdominal

## 2020-02-18 PROBLEM — Z51.11 CHEMOTHERAPY MANAGEMENT, ENCOUNTER FOR: Status: ACTIVE | Noted: 2020-02-18

## 2020-02-27 ENCOUNTER — OFFICE VISIT (OUTPATIENT)
Dept: NEUROLOGY | Facility: CLINIC | Age: 55
End: 2020-02-27

## 2020-02-27 VITALS
DIASTOLIC BLOOD PRESSURE: 70 MMHG | HEIGHT: 68 IN | SYSTOLIC BLOOD PRESSURE: 122 MMHG | OXYGEN SATURATION: 98 % | BODY MASS INDEX: 31.52 KG/M2 | WEIGHT: 208 LBS | HEART RATE: 85 BPM

## 2020-02-27 DIAGNOSIS — Z51.81 MEDICATION MONITORING ENCOUNTER: ICD-10-CM

## 2020-02-27 DIAGNOSIS — R56.9 SEIZURES (HCC): Primary | ICD-10-CM

## 2020-02-27 DIAGNOSIS — G47.33 OBSTRUCTIVE SLEEP APNEA: ICD-10-CM

## 2020-02-27 DIAGNOSIS — C71.9 ASTROCYTOMA BRAIN TUMOR (HCC): ICD-10-CM

## 2020-02-27 DIAGNOSIS — G81.94 LEFT HEMIPARESIS (HCC): ICD-10-CM

## 2020-02-27 PROBLEM — R74.01 ELEVATED TRANSAMINASE LEVEL: Status: RESOLVED | Noted: 2019-07-01 | Resolved: 2020-02-27

## 2020-02-27 PROBLEM — T50.905A DRUG-INDUCED HEPATIC TOXICITY: Status: RESOLVED | Noted: 2019-09-06 | Resolved: 2020-02-27

## 2020-02-27 PROBLEM — E72.20 HYPERAMMONEMIA (HCC): Status: RESOLVED | Noted: 2019-07-03 | Resolved: 2020-02-27

## 2020-02-27 PROBLEM — K71.6 DRUG-INDUCED HEPATIC TOXICITY: Status: RESOLVED | Noted: 2019-09-06 | Resolved: 2020-02-27

## 2020-02-27 PROBLEM — E16.2 HYPOGLYCEMIA: Status: RESOLVED | Noted: 2019-06-29 | Resolved: 2020-02-27

## 2020-02-27 PROBLEM — G93.41 METABOLIC ENCEPHALOPATHY: Status: RESOLVED | Noted: 2019-07-15 | Resolved: 2020-02-27

## 2020-02-27 PROBLEM — R41.82 ALTERED MENTAL STATUS: Status: RESOLVED | Noted: 2019-07-15 | Resolved: 2020-02-27

## 2020-02-27 PROCEDURE — 99214 OFFICE O/P EST MOD 30 MIN: CPT | Performed by: PHYSICIAN ASSISTANT

## 2020-03-03 ENCOUNTER — LAB (OUTPATIENT)
Dept: LAB | Facility: HOSPITAL | Age: 55
End: 2020-03-03

## 2020-03-03 ENCOUNTER — HOSPITAL ENCOUNTER (OUTPATIENT)
Dept: MRI IMAGING | Facility: HOSPITAL | Age: 55
Discharge: HOME | End: 2020-03-03
Admitting: NEUROLOGICAL SURGERY

## 2020-03-03 ENCOUNTER — OFFICE VISIT (OUTPATIENT)
Dept: NEUROSURGERY | Facility: CLINIC | Age: 55
End: 2020-03-03

## 2020-03-03 VITALS
SYSTOLIC BLOOD PRESSURE: 120 MMHG | HEIGHT: 68 IN | BODY MASS INDEX: 31.52 KG/M2 | WEIGHT: 208 LBS | DIASTOLIC BLOOD PRESSURE: 64 MMHG

## 2020-03-03 DIAGNOSIS — Z78.9 NON-SMOKER: ICD-10-CM

## 2020-03-03 DIAGNOSIS — C71.9 ASTROCYTOMA BRAIN TUMOR (HCC): ICD-10-CM

## 2020-03-03 DIAGNOSIS — G89.3 CANCER ASSOCIATED PAIN: ICD-10-CM

## 2020-03-03 DIAGNOSIS — C71.9 ASTROCYTOMA BRAIN TUMOR (HCC): Primary | ICD-10-CM

## 2020-03-03 LAB
ALBUMIN SERPL-MCNC: 4.2 G/DL (ref 3.5–5.2)
ALP SERPL-CCNC: 59 U/L (ref 39–117)
ALT SERPL W P-5'-P-CCNC: 20 U/L (ref 1–41)
AMMONIA BLD-SCNC: 47 UMOL/L (ref 16–60)
AST SERPL-CCNC: 21 U/L (ref 1–40)
BILIRUB CONJ SERPL-MCNC: <0.2 MG/DL (ref 0.2–0.3)
BILIRUB INDIRECT SERPL-MCNC: ABNORMAL MG/DL
BILIRUB SERPL-MCNC: 0.3 MG/DL (ref 0.2–1.2)
CREAT BLDA-MCNC: 1.2 MG/DL (ref 0.6–1.3)
PROT SERPL-MCNC: 6.6 G/DL (ref 6–8.5)
VALPROATE SERPL-MCNC: 72.5 MCG/ML (ref 50–125)

## 2020-03-03 PROCEDURE — 99213 OFFICE O/P EST LOW 20 MIN: CPT | Performed by: NEUROLOGICAL SURGERY

## 2020-03-03 PROCEDURE — 82140 ASSAY OF AMMONIA: CPT | Performed by: PHYSICIAN ASSISTANT

## 2020-03-03 PROCEDURE — A9577 INJ MULTIHANCE: HCPCS | Performed by: NEUROLOGICAL SURGERY

## 2020-03-03 PROCEDURE — 82565 ASSAY OF CREATININE: CPT | Performed by: PHYSICIAN ASSISTANT

## 2020-03-03 PROCEDURE — 36415 COLL VENOUS BLD VENIPUNCTURE: CPT | Performed by: PHYSICIAN ASSISTANT

## 2020-03-03 PROCEDURE — 80076 HEPATIC FUNCTION PANEL: CPT | Performed by: PHYSICIAN ASSISTANT

## 2020-03-03 PROCEDURE — 0 GADOBENATE DIMEGLUMINE 529 MG/ML SOLUTION: Performed by: NEUROLOGICAL SURGERY

## 2020-03-03 PROCEDURE — 80164 ASSAY DIPROPYLACETIC ACD TOT: CPT | Performed by: PHYSICIAN ASSISTANT

## 2020-03-03 PROCEDURE — 80177 DRUG SCRN QUAN LEVETIRACETAM: CPT | Performed by: PHYSICIAN ASSISTANT

## 2020-03-03 PROCEDURE — 70553 MRI BRAIN STEM W/O & W/DYE: CPT

## 2020-03-03 PROCEDURE — 80235 DRUG ASSAY LACOSAMIDE: CPT | Performed by: PHYSICIAN ASSISTANT

## 2020-03-03 RX ORDER — MORPHINE SULFATE 15 MG/1
TABLET, FILM COATED, EXTENDED RELEASE ORAL
COMMUNITY
Start: 2020-02-14 | End: 2020-06-26 | Stop reason: SINTOL

## 2020-03-03 RX ADMIN — GADOBENATE DIMEGLUMINE 20 ML: 529 INJECTION, SOLUTION INTRAVENOUS at 15:09

## 2020-03-03 NOTE — PROGRESS NOTES
SUBJECTIVE:  Patient ID: Lukasz Savage is a 55 y.o. male is here today for follow-up.    Chief Complaint: Grade 3 anaplastic glioma  No chief complaint on file.      HPI  This is a 54-year-old male gentleman who went to the operating room and April 2019 for a stereotactic brain biopsy which did turn out to be an grade 3 anaplastic glioma.  He is on Temodar.  He has underwent IMRT radiation treatments.  He has had several issues with seizures since his diagnosis.   He relates no seizures since July 2019.  He is on Vimpat Keppra and Depakote.  He did do a short course of physical therapy for gait and balance training but the [facility] did not feel that he needed continued therapy after only a couple of sessions.  That being said overall he does feel he is doing better and moving better and walking better.    The following portions of the patient's history were reviewed and updated as appropriate: allergies, current medications, past family history, past medical history, past social history, past surgical history and problem list.    OBJECTIVE:    Review of Systems   Musculoskeletal: Positive for gait problem.   Neurological: Positive for weakness.   All other systems reviewed and are negative.         Physical Exam    Neurologic Exam  Mental Status   Oriented to person, place, and time.   Attention: normal.   Speech: speech is normal   Level of consciousness: alert  Knowledge: good.      Cranial Nerves   Cranial nerves II through XII intact.      Motor Exam   Muscle bulk: normal  Overall muscle tone: normal  Right arm pronator drift: absent  Left arm pronator drift: absentLeft upper extremity weakness distal greater than proximal.  Some ataxia with the left upper and lower extremity also      Sensory Exam   Light touch normal.   Pinprick normal.      Gait, Coordination, and Reflexes      Gait  Gait: (Mildly unsteady gait but walks without assistance)     Reflexes   Reflexes 2+ except as noted. Left  upper extremity dysmetria and dysdiadochokinesia   Independent Review of Radiographic Studies:   MRI of the brain with and without contrast shows no new areas of enhancement with stable right frontal FLAIR changes.    ASSESSMENT/PLAN:  The patient's MRI does not really demonstrate any evidence of disease progression.  He seems to be doing well clinically.  He is had no further seizures.  We are going to renew an order for physical therapy and gait and balance training.  I recommend that he see Dr. Soria in [place] for this.  He is to follow-up with Dr. Ortiz for some orthopedic issues.  I see no reason why he cannot engage in any treatment that Dr. Ortiz may feel is necessary.  See him in follow-up in 3 months with a repeat MRI.      No diagnosis found.        No follow-ups on file.      Dillon Trevino MD

## 2020-03-03 NOTE — PATIENT INSTRUCTIONS
"PATIENT TO CONTINUE TO FOLLOW UP WITH HIS PRIMARY CARE PROVIDER FOR YEARLY PHYSICAL EXAMS TO ENSURE COMPLETE HEALTH MAINTENANCE        BMI for Adults    Body mass index (BMI) is a number that is calculated from a person's weight and height. BMI may help to estimate how much of a person's weight is composed of fat. BMI can help identify those who may be at higher risk for certain medical problems.  How is BMI used with adults?  BMI is used as a screening tool to identify possible weight problems. It is used to check whether a person is obese, overweight, healthy weight, or underweight.  How is BMI calculated?  BMI measures your weight and compares it to your height. This can be done either in English ([place] or metric measurements. Note that charts are available to help you find your BMI quickly and easily without having to do these calculations yourself.  To calculate your BMI in English ([place] measurements, your health care provider will:  1. Measure your weight in pounds (lb).  2. Multiply the number of pounds by 703.  ? For example, for a person who weighs 180 lb, multiply that number by 703, which equals 126,540.  3. Measure your height in inches (in). Then multiply that number by itself to get a measurement called \"inches squared.\"  ? For example, for a person who is 70 in tall, the \"inches squared\" measurement is 70 in x 70 in, which equals 4900 inches squared.  4. Divide the total from Step 2 (number of lb x 703) by the total from Step 3 (inches squared): 126,540 ÷ 4900 = 25.8. This is your BMI.  To calculate your BMI in metric measurements, your health care provider will:  1. Measure your weight in kilograms (kg).  2. Measure your height in meters (m). Then multiply that number by itself to get a measurement called \"meters squared.\"  ? For example, for a person who is 1.75 m tall, the \"meters squared\" measurement is 1.75 m x 1.75 m, which is equal to 3.1 meters squared.  3. Divide the number of kilograms " (your weight) by the meters squared number. In this example: 70 ÷ 3.1 = 22.6. This is your BMI.  How is BMI interpreted?  To interpret your results, your health care provider will use BMI charts to identify whether you are underweight, normal weight, overweight, or obese. The following guidelines will be used:  · Underweight: BMI less than 18.5.  · Normal weight: BMI between 18.5 and 24.9.  · Overweight: BMI between 25 and 29.9.  · Obese: BMI of 30 and above.  Please note:  · Weight includes both fat and muscle, so someone with a muscular build, such as an athlete, may have a BMI that is higher than 24.9. In cases like these, BMI is not an accurate measure of body fat.  · To determine if excess body fat is the cause of a BMI of 25 or higher, further assessments may need to be done by a health care provider.  · BMI is usually interpreted in the same way for men and women.  Why is BMI a useful tool?  BMI is useful in two ways:  · Identifying a weight problem that may be related to a medical condition, or that may increase the risk for medical problems.  · Promoting lifestyle and diet changes in order to reach a healthy weight.  Summary  · Body mass index (BMI) is a number that is calculated from a person's weight and height.  · BMI may help to estimate how much of a person's weight is composed of fat. BMI can help identify those who may be at higher risk for certain medical problems.  · BMI can be measured using English measurements or metric measurements.  · To interpret your results, your health care provider will use BMI charts to identify whether you are underweight, normal weight, overweight, or obese.  This information is not intended to replace advice given to you by your health care provider. Make sure you discuss any questions you have with your health care provider.  Document Released: 08/29/2005 Document Revised: 10/31/2018 Document Reviewed: 10/31/2018  [employer] Interactive Patient Education © 2020  [employer]

## 2020-03-04 DIAGNOSIS — R56.9 SEIZURES (HCC): ICD-10-CM

## 2020-03-04 LAB — LACOSAMIDE: 7.3 UG/ML (ref 5–10)

## 2020-03-04 RX ORDER — DIVALPROEX SODIUM 500 MG/1
TABLET, DELAYED RELEASE ORAL
Qty: 180 TABLET | Refills: 5 | Status: SHIPPED | OUTPATIENT
Start: 2020-03-04 | End: 2020-09-16

## 2020-03-05 LAB — LEVETIRACETAM SERPL-MCNC: 39.4 UG/ML (ref 10–40)

## 2020-03-11 ENCOUNTER — OFFICE VISIT (OUTPATIENT)
Dept: FAMILY MEDICINE CLINIC | Facility: CLINIC | Age: 55
End: 2020-03-11

## 2020-03-11 ENCOUNTER — HOSPITAL ENCOUNTER (OUTPATIENT)
Dept: GENERAL RADIOLOGY | Facility: HOSPITAL | Age: 55
Discharge: HOME | End: 2020-03-11
Admitting: NURSE PRACTITIONER

## 2020-03-11 VITALS
TEMPERATURE: 98.2 F | OXYGEN SATURATION: 97 % | SYSTOLIC BLOOD PRESSURE: 160 MMHG | BODY MASS INDEX: 31.1 KG/M2 | HEART RATE: 72 BPM | DIASTOLIC BLOOD PRESSURE: 94 MMHG | RESPIRATION RATE: 18 BRPM | HEIGHT: 68 IN | WEIGHT: 205.2 LBS

## 2020-03-11 DIAGNOSIS — S22.42XA CLOSED FRACTURE OF MULTIPLE RIBS OF LEFT SIDE, INITIAL ENCOUNTER: ICD-10-CM
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
## 2020-03-11 DIAGNOSIS — R07.81 RIB PAIN ON LEFT SIDE: Primary | ICD-10-CM

## 2020-03-11 PROCEDURE — 71101 X-RAY EXAM UNILAT RIBS/CHEST: CPT

## 2020-03-11 PROCEDURE — 99213 OFFICE O/P EST LOW 20 MIN: CPT | Performed by: NURSE PRACTITIONER

## 2020-03-11 RX ORDER — DICLOFENAC SODIUM 75 MG/1
75 TABLET, DELAYED RELEASE ORAL 2 TIMES DAILY
Qty: 20 TABLET | Refills: 0 | Status: SHIPPED | OUTPATIENT
Start: 2020-03-11 | End: 2020-06-26

## 2020-03-11 RX ORDER — CYCLOBENZAPRINE HCL 5 MG
5 TABLET ORAL 3 TIMES DAILY PRN
Qty: 30 TABLET | Refills: 0 | Status: ON HOLD | OUTPATIENT
Start: 2020-03-11 | End: 2020-01-01

## 2020-03-11 NOTE — PROGRESS NOTES
Subjective   Lukasz Paxton Savage is a 55 y.o. male presents with rib pain after fall.     History of Present Illness   2 nights ago. Playing with dog and stepped into hole in yard and fell on left and chest on concrete. Left front ribs hurting bad since.  No trouble breathing other than some pain.  No dyspnea, no hematuria.  Did not lose consciousness.      The following portions of the patient's history were reviewed and updated as appropriate: allergies, current medications, past family history, past medical history, past social history, past surgical history and problem list.    Review of Systems   Constitutional: Negative for activity change, appetite change, chills, diaphoresis, fatigue and fever.   Respiratory: Negative for cough, choking, chest tightness, shortness of breath and wheezing.    Cardiovascular: Positive for chest pain (left anterior/lateral, painful to touch and deep breathing.). Negative for palpitations.   Genitourinary: Negative for difficulty urinating and hematuria.   Neurological: Negative for dizziness, syncope, weakness, light-headedness and headaches.       Objective     Vitals:    03/11/20 0859   BP: 160/94   Pulse: 72   Resp: 18   Temp: 98.2 °F (36.8 °C)   SpO2: 97%       Physical Exam   Constitutional: He is oriented to person, place, and time. Vital signs are normal. He appears well-developed and well-nourished.   HENT:   Head: Normocephalic and atraumatic.   Eyes: Conjunctivae are normal.   Neck: Neck supple.   Cardiovascular: Normal rate and regular rhythm.   Pulmonary/Chest: Effort normal. Chest wall is not dull to percussion. He exhibits tenderness, bony tenderness and swelling. He exhibits no mass, no laceration, no crepitus, no edema, no deformity and no retraction.       Lymphadenopathy:     He has no cervical adenopathy.   Neurological: He is alert and oriented to person, place, and time.   Skin: No abrasion and no bruising noted.   Psychiatric: He has a normal mood and  affect.   Nursing note and vitals reviewed.         Patient's Body mass index is 31.2 kg/m². BMI is above normal parameters. Recommendations include: none (medical contraindication) (In acute pain today).      Assessment/Plan   Lukasz was seen today for chest pain.    Diagnoses and all orders for this visit:    Rib pain on left side  -     XR Ribs Left With PA Chest    Fall, initial encounter  -     XR Ribs Left With PA Chest    Closed fracture of multiple ribs of left side, initial encounter    Other orders  -     diclofenac (VOLTAREN) 75 MG EC tablet; Take 1 tablet by mouth 2 (Two) Times a Day.  -     cyclobenzaprine (FLEXERIL) 5 MG tablet; Take 1 tablet by mouth 3 (Three) Times a Day As Needed for Muscle Spasms.      Xr Ribs Left With Pa Chest    Result Date: 3/11/2020  EXAMINATION: XR RIBS LEFT W PA CHEST-  3/11/2020 10:00 AM CDT  HISTORY: left anterior rib pain after fall; R07.81-Pleurodynia; W19.XXXA-Unspecified fall, initial encounter  Chest x-ray and left rib films, 5 views.  Normal heart and mediastinum.  Adequately expanded lungs.  Minimally displaced left sixth and seventh rib fractures. Underlying mild pleural thickening and atelectasis. No pneumothorax.  Summary: 1. Left sixth and seventh rib fractures. 2. No pneumothorax. 3. Mild atelectasis. This report was finalized on 03/11/2020 10:22 by Dr. Camilo Almodovar MD.        Blood pressure is mildly elevated today, I suspect this is due to acute pain.  Patient has appointment with oncology in 2 days advised to monitor this blood pressure and return to clinic if staying this elevated.    Advised to continue to cough and deep breathe to prevent pneumonia.  Do not splint ribs.  Advised this will take about 6 weeks to heal.  Return to clinic if any difficulty with breathing.  Patient agrees.  Advised to not take any other NSAIDs while on the diclofenac.  Renal function stable.        Return in about 4 weeks (around 4/8/2020), or if symptoms worsen or fail to  improve.             Electronically signed by Zakia Riojas, EPI, 03/11/20, 9:53 AM.

## 2020-03-13 ENCOUNTER — OFFICE VISIT (OUTPATIENT)
Dept: HEMATOLOGY | Age: 55
End: 2020-03-13
Payer: MEDICAID

## 2020-03-13 ENCOUNTER — HOSPITAL ENCOUNTER (OUTPATIENT)
Dept: INFUSION THERAPY | Age: 55
Discharge: HOME | End: 2020-03-13
Payer: MEDICAID

## 2020-03-13 VITALS
SYSTOLIC BLOOD PRESSURE: 138 MMHG | HEART RATE: 68 BPM | OXYGEN SATURATION: 96 % | BODY MASS INDEX: 31.89 KG/M2 | WEIGHT: 210.4 LBS | DIASTOLIC BLOOD PRESSURE: 70 MMHG | HEIGHT: 68 IN

## 2020-03-13 DIAGNOSIS — C71.9 ASTROCYTOMA (HCC): ICD-10-CM

## 2020-03-13 DIAGNOSIS — T45.1X5A ANEMIA ASSOCIATED WITH CHEMOTHERAPY: ICD-10-CM

## 2020-03-13 DIAGNOSIS — D64.81 ANEMIA ASSOCIATED WITH CHEMOTHERAPY: ICD-10-CM

## 2020-03-13 PROCEDURE — 80053 COMPREHEN METABOLIC PANEL: CPT

## 2020-03-13 PROCEDURE — 85025 COMPLETE CBC W/AUTO DIFF WBC: CPT

## 2020-03-13 PROCEDURE — 99211 OFF/OP EST MAY X REQ PHY/QHP: CPT

## 2020-03-13 PROCEDURE — 99214 OFFICE O/P EST MOD 30 MIN: CPT | Performed by: NURSE PRACTITIONER

## 2020-03-13 PROCEDURE — 36415 COLL VENOUS BLD VENIPUNCTURE: CPT

## 2020-03-13 RX ORDER — OXYCODONE AND ACETAMINOPHEN 10; 325 MG/1; MG/1
1 TABLET ORAL EVERY 4 HOURS PRN
Qty: 120 TABLET | Refills: 0 | Status: SHIPPED | OUTPATIENT
Start: 2020-03-13 | End: 2020-04-10 | Stop reason: SDUPTHER

## 2020-03-13 ASSESSMENT — ENCOUNTER SYMPTOMS
ABDOMINAL PAIN: 0
SHORTNESS OF BREATH: 0
COUGH: 0
EYE DISCHARGE: 0
CONSTIPATION: 0
VOMITING: 0
RESPIRATORY NEGATIVE: 1
EYE PAIN: 0
SORE THROAT: 0
WHEEZING: 0
EYE REDNESS: 0
BLOOD IN STOOL: 0
DIARRHEA: 0
EYES NEGATIVE: 1

## 2020-03-13 NOTE — PROGRESS NOTES
Progress Note      Pt Name: Teja Powell  YOB: 1965  MRN: [id number]    Date of evaluation: 3/13/2020  History Obtained From:  patient, electronic medical record    CHIEF COMPLAINT:    Chief Complaint   Patient presents with    Follow-up     Anaplastic glioma of brain     Pain    Discuss Labs     Discuss MRI results    Treatment    Fatigue    Oral Chemotherapy Monitoring     HISTORY OF PRESENT ILLNESS:    Teja Powell is a 48 y.o.  male with significant PMH of anaplastic glioma, diagnosed April 2019. He is presently receiving palliative Temodar 150 mg/m² days 1-5 on a 28-day regimen with anticipation of continuing until intolerance or progression of disease. Grazyna éPrez has been tolerating Temodar without significant difficulty other than nausea that is overall controlled with Zofran. He returns today in scheduled 4-week follow-up for evaluation, side effect monitoring and lab monitoring. He is accompanied by his mother whom is his primary caregiver. Grazyna Pérez indicates that he fell on 3/11/2020 and x-ray revealed left sixth and seventh rib fractures. He continues to have significant discomfort from these fractures. At [address] [place] [address],[address] last follow-up he complained of having increased headaches with increased intensity. He was provided with a prescription for MS Contin 15 mg every 12 hours, his mother reports that he did not do well with the medication and was experiencing hallucinations and significant lethargy. Grazyna Pérez is now only taking the Percocet 7.5 mg every 4 hours. He denied any vision changes or other neurological complaints. He continues to have chronic left-sided weakness and decreased organizational skills. He denies any seizure activity and continues to take anticonvulsant medications as prescribed.     Grazyna Pérez had an MRI of the brain with and without contrast on 3/3/2020 documented a slightly decreased abnormal flair signal in the right frontoparietal immediately above the corpus callosum. This was concerning for high-grade glioma. · 4/15/2019-he underwent a craniotomy with stereotactic biopsy by Dr. Prentice Duane at [facility]. 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 Further molecular analysis at [facility] revealed IDH1/2 wild type, MGMT non-methylated, TERT mutation. Complex cytogenetics changes A maximum safe resection was not possible due to concerns of significant sequela. Second opinion was recommended at the [facility]. · 5/17/2019-he was seen by Dr. Ijeoma Ochoa. Recommended concurrent chemoradiation. · 5/24/2019-initial oncology consultation, Dr. Isidro Liao recommended concurrent chemoradiation with Temodar. · 6/3/2019. CT scan of the head without contrast documented to ill-defined masslike areas of increased attenuation within the right frontal lobe. Largest measuring 3.3 cm, previously measuring 1.8 cm and second lesion in the lateral aspect of the right frontal lobe measuring up to 1.5 cm. No intracranial hemorrhage or midline shift. · 6/3/2019- MRI of the brain with and without contrast, compared to 4/2/2019 documented masslike appearance at the right frontal lobe measuring 2.9 x 1.4 cm, previously measuring 1.7 x 1.1 cm with mass effect and possible extension into the corpus callosum. No midline shift. · 6/14/2019-CT scan cervical spine without contrast documented no evidence of acute bony injury. Multilevel disc degeneration spondylosis. · 6/12/2019- Initiated Temodar along with radiation therapy   · 6/17/2019- MRI of the brain with and without contrast documented 3 cm enhancing, partially necrotic tumor in the both the right corpus callosum body, consistent with known astrocytoma. Additional FLAIR signal on both sides of the right central sulcus with faint contrast enhancement in the precentral gyrus, compatible with tumor extension. No hemorrhage or brain herniation.    · 6/21/2019 (TOPROL XL) 25 MG extended release tablet Take 25 mg by mouth      oxyCODONE-acetaminophen (PERCOCET) 7.5-325 MG per tablet oxycodone-acetaminophen 7.5 mg-325 mg tablet      promethazine (PHENERGAN) 12.5 MG tablet promethazine 12.5 mg tablet   one po q4h      vitamin B-6 (PYRIDOXINE) 100 MG tablet       venlafaxine (EFFEXOR XR) 150 MG extended release capsule venlafaxine  mg capsule,extended release 24 hr      temozolomide (TEMODAR) 100 MG chemo capsule TAKE 3 CAPSULES (300MG) BY MOUTH ONCE A DAY ON DAYS 1-5 OF EACH 28 DAYCYCLE. 15 capsule 4    lactulose 20 GM/30ML SOLN Take by mouth as needed      ondansetron (ZOFRAN) 4 MG tablet TAKE 2 TABLETS BY MOUTH EVERY 8 HOURS AS NEEDED FOR NAUSEA OR VOMITING 30 tablet 2    levETIRAcetam (KEPPRA) 250 MG tablet Take 250 mg by mouth 2 times daily   5    ALPRAZolam (XANAX) 0.25 MG tablet Take 0.25 mg by mouth 2 times daily as needed for Sleep.  lacosamide (VIMPAT) 50 MG TABS tablet Take 150 mg by mouth every 12 hours.  omeprazole (PRILOSEC) 10 MG delayed release capsule Take 10 mg by mouth daily      losartan (COZAAR) 100 MG tablet Take 50 mg by mouth daily       oxyCODONE-acetaminophen (PERCOCET)  MG per tablet Take 1 tablet by mouth every 4 hours as needed for Pain for up to 30 days. Intended supply: 30 days 120 tablet 0     No current facility-administered medications for this visit. Allergies:    Allergies   Allergen Reactions    Pregabalin Other (See Comments)    Lisinopril Swelling     BRAIN SWELLING/COMA    Lipitor [Atorvastatin] Rash       Social History:    Social History     Tobacco Use    Smoking status: Former Smoker     Packs/day: 0.50     Years: 20.00     Pack years: 10.00     Types: Cigarettes     Last attempt to quit: 2015     Years since quittin.7    Smokeless tobacco: Current User   Substance Use Topics    Alcohol use: Yes     Comment: OCC    Drug use: No       Family History:   No family history on results for input(s): PHART, KDS9XOG, PO2ART, TGJ3XNU, R6DRBNSK, BEART in the last 72 hours. 30 Day lookback of cultures:    Blood Culture Recent: No results for input(s): BC in the last 720 hours. Gram Stain Recent: No results for input(s): LABGRAM in the last 720 hours. Resp Culture Recent: No results for input(s): CULTRESP in the last 720 hours. Body Fluid Recent : No results for input(s): BFCX in the last 720 hours. MRSA Recent : No results for input(s): [address] in the last 720 hours. Urine Culture Recent : No results for input(s): LABURIN in the last 720 hours. Organism Recent : No results for input(s): ORG in the last 720 hours. ASSESSMENT/PLAN:      1. Anaplastic glioma, unresectable MGMT un-methylated. Status post combined modality therapy with radiation and Temodar. MRI of the brain on 3/3/2020 continues to suggest a positive response to treatment Dr. Madeleine Mayfield has recommended repeating MRI in 3 months. Continue Temodar 150 mg/m² days 1 through 5 on a 28-day regimen. Anticipate continue with Temodar until intolerance or disease progression.      - Continue Temodar days 1 through 5, 28-day regimen  - Anticipating repeat MRI of the brain and follow-up with Dr. Madeleine Mayfield in 3 months  - CBC Auto Differential; Future  - Comprehensive Metabolic Panel; Future    CMP on 2/14/2020 was within normal limits without evidence of liver toxicity. 2. Fatigue due to treatment and disease process.   -Encouraged to increase activity as tolerated  -Close monitoring     3. Chronic pain of both knees  -Follow-up with Dr. Eber Wilkerson     4. Requires continuous supervision for activities of daily living (ADL) secondary to disease process and seizure activity.   -Anticipating physical therapy with Dr. Domenica Molina in [facility] as per Dr. Malcolm Gregory recommendation   -Seizure activity is controlled with anticonvulsants    5.   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 twice daily, due to adverse side effects    6. Side effect monitoring of chemotherapy  - Fatigue grade 2, stable  -Nausea remains controlled with Zofran    7. Recent left rib fractures  -Percocet PRN    FOLLOW UP:  1. Follow-up appointment given for 4 weeks  2. Follow-up with Dr. Nadja Ramsay in [facility] for physical therapy  3. Follow-up with Dr. Raj Patel related to knee pain. 4. Follow-up with Dr. Kesha Jenkins as scheduled  5. Follow-up with other medical providers as recommended    Over 50% of the total visit time of 25 minutes in face to face encounter with the patient, out of which more than 50% of the time was spent in counseling patient  and coordination of care. Counseling included but was not limited to time spent reviewing labs, imaging studies/ treatment plan and answering questions. This does not include charting.     Electronically signed by VLADIMIR Sainz on 3/16/2020 at 2:12 PM

## 2020-03-16 ASSESSMENT — ENCOUNTER SYMPTOMS
BACK PAIN: 1
NAUSEA: 0
GASTROINTESTINAL NEGATIVE: 1

## 2020-03-20 ENCOUNTER — APPOINTMENT (OUTPATIENT)
Dept: NEUROLOGY | Facility: HOSPITAL | Age: 55
End: 2020-03-20

## 2020-03-23 NOTE — PROGRESS NOTES
Subjective   Lukasz Savage is a 55 y.o. male is here today for follow-up.    No recent seizures, last known seizure was in August 2019.  He follows regularly as directed with neurosurgery.  Overall interested in increasing his activities and decreasing medications when possible.      Neurologic Problem   The patient's primary symptoms include an altered mental status, focal sensory loss, focal weakness, a loss of balance, memory loss, slurred speech and weakness. This is a chronic problem. The current episode started more than 1 month ago. The neurological problem developed suddenly. The problem has been gradually improving since onset. There was left-sided focality noted. Associated symptoms include fatigue, nausea and vomiting. Pertinent negatives include no confusion. Past treatments include sleep, bed rest, position change and acetaminophen. The treatment provided no relief. (PRES 2017)   Seizures    This is a recurrent problem. The current episode started more than 1 week ago. The problem has not changed since onset.There were more than 10 seizures. The most recent episode lasted 30 to 120 seconds. Associated symptoms include nausea and vomiting. Pertinent negatives include no confusion and no speech difficulty. Characteristics include rhythmic jerking, loss of consciousness and bit tongue. The episode was witnessed. There was no sensation of an aura present. The seizure(s) had left-sided focality.   Brain Tumor   This is a chronic problem. The current episode started more than 1 month ago. The problem occurs constantly. The problem has been unchanged. Associated symptoms include fatigue, nausea, vomiting and weakness. The symptoms are aggravated by exertion and stress. He has tried rest for the symptoms. The treatment provided mild relief.        The following portions of the patient's history were reviewed and updated as appropriate: allergies, current medications, past family history, past medical  history, past social history, past surgical history and problem list.    Review of Systems   Constitutional: Positive for fatigue.   HENT: Negative.    Eyes: Positive for photophobia.   Respiratory: Negative.    Cardiovascular: Negative.    Gastrointestinal: Positive for nausea and vomiting.   Endocrine: Negative.    Genitourinary: Negative.    Musculoskeletal: Positive for gait problem.   Skin: Negative.    Allergic/Immunologic: Negative.    Neurological: Positive for tremors, focal weakness, loss of consciousness, facial asymmetry, weakness, headache, loss of balance and memory problem. Negative for seizures, speech difficulty and confusion.   Hematological: Negative.    Psychiatric/Behavioral: Positive for agitation, decreased concentration, dysphoric mood, hallucinations, memory loss and sleep disturbance. The patient is nervous/anxious.        Objective   Physical Exam   Constitutional: He is oriented to person, place, and time. Vital signs are normal.   HENT:   Head: Normocephalic.   Right Ear: Hearing and external ear normal.   Left Ear: Hearing and external ear normal.   Nose: Nose normal.   Mouth/Throat: Uvula is midline, oropharynx is clear and moist and mucous membranes are normal.   Eyes: Pupils are equal, round, and reactive to light. Conjunctivae, EOM and lids are normal. No scleral icterus.   Neck: Trachea normal, normal range of motion and phonation normal. No JVD present. Carotid bruit is not present.   Cardiovascular: Normal rate, regular rhythm and normal heart sounds.   No murmur heard.  Pulmonary/Chest: Effort normal and breath sounds normal.   Neurological: He is alert and oriented to person, place, and time. He displays tremor. He displays no atrophy. No cranial nerve deficit or sensory deficit. He exhibits abnormal muscle tone. Gait abnormal. GCS eye subscore is 4. GCS verbal subscore is 5. GCS motor subscore is 6.   Reflex Scores:       Bicep reflexes are 2+ on the right side and 3+ on the  left side.       Brachioradialis reflexes are 2+ on the right side and 3+ on the left side.       Patellar reflexes are 2+ on the right side and 3+ on the left side.  Stable left hemiparesis worse in the upper extremity, worse distal to proximal.   Skin: Skin is warm, dry and intact.   Psychiatric: His speech is normal. Thought content normal. His mood appears anxious. He is slowed. Cognition and memory are normal. He expresses impulsivity.   Nursing note and vitals reviewed.        Assessment/Plan   Lukasz was seen today for brain tumor and seizures.    Diagnoses and all orders for this visit:    Seizures (CMS/HCC)  -     Valproic Acid Level, Total  -     Lacosamide Blood  -     Levetiracetam Level (Keppra)  -     Hepatic Function Panel  -     Ammonia  -     EEG; Future    Left hemiparesis (CMS/HCC)    Astrocytoma brain tumor (CMS/HCC)  -     EEG; Future    Obstructive sleep apnea    Medication monitoring encounter  -     Valproic Acid Level, Total  -     Lacosamide Blood  -     Levetiracetam Level (Keppra)  -     Hepatic Function Panel  -     Ammonia    Today's findings and recommendations were reviewed with the patient.  I recommended monitoring levels and a follow-up EEG prior to considering changing his antiepileptic medication regimen.  The patient has multiple questions and concerns these are discussed with his mother and the patient.  Medication recommendations, expectations, safety precautions are reviewed with the patient and his mother.      20 minutes of a 25 minute outpatient visit was spent in counseling and coordination of care today.           Dictated utilizing Dragon dictation.

## 2020-03-26 RX ORDER — LACOSAMIDE 150 MG/1
TABLET, FILM COATED ORAL
Qty: 60 TABLET | Refills: 2 | OUTPATIENT
Start: 2020-03-26 | End: 2020-06-24

## 2020-04-10 ENCOUNTER — OFFICE VISIT (OUTPATIENT)
Dept: HEMATOLOGY | Age: 55
End: 2020-04-10
Payer: MEDICAID

## 2020-04-10 ENCOUNTER — HOSPITAL ENCOUNTER (OUTPATIENT)
Dept: INFUSION THERAPY | Age: 55
Discharge: HOME | End: 2020-04-10
Payer: MEDICAID

## 2020-04-10 VITALS
SYSTOLIC BLOOD PRESSURE: 138 MMHG | WEIGHT: 205.9 LBS | HEART RATE: 88 BPM | TEMPERATURE: 97.9 F | DIASTOLIC BLOOD PRESSURE: 74 MMHG | BODY MASS INDEX: 31.2 KG/M2 | HEIGHT: 68 IN | OXYGEN SATURATION: 95 %

## 2020-04-10 PROCEDURE — 36415 COLL VENOUS BLD VENIPUNCTURE: CPT

## 2020-04-10 PROCEDURE — 80053 COMPREHEN METABOLIC PANEL: CPT

## 2020-04-10 PROCEDURE — 99213 OFFICE O/P EST LOW 20 MIN: CPT | Performed by: NURSE PRACTITIONER

## 2020-04-10 PROCEDURE — 85025 COMPLETE CBC W/AUTO DIFF WBC: CPT

## 2020-04-10 PROCEDURE — 99212 OFFICE O/P EST SF 10 MIN: CPT

## 2020-04-10 RX ORDER — OXYCODONE AND ACETAMINOPHEN 10; 325 MG/1; MG/1
1 TABLET ORAL EVERY 4 HOURS PRN
Qty: 180 TABLET | Refills: 0
Start: 2020-04-10 | End: 2020-05-08 | Stop reason: SDUPTHER

## 2020-04-10 RX ORDER — OXYCODONE AND ACETAMINOPHEN 10; 325 MG/1; MG/1
1 TABLET ORAL EVERY 4 HOURS PRN
Qty: 120 TABLET | Refills: 0 | Status: SHIPPED | OUTPATIENT
Start: 2020-04-10 | End: 2020-04-10 | Stop reason: SDUPTHER

## 2020-04-10 ASSESSMENT — ENCOUNTER SYMPTOMS
SORE THROAT: 0
GASTROINTESTINAL NEGATIVE: 1
NAUSEA: 0
DIARRHEA: 0
EYE REDNESS: 0
CONSTIPATION: 0
EYES NEGATIVE: 1
WHEEZING: 0
EYE DISCHARGE: 0
SHORTNESS OF BREATH: 0
ABDOMINAL PAIN: 0
VOMITING: 0
BLOOD IN STOOL: 0
RESPIRATORY NEGATIVE: 1
BACK PAIN: 1
EYE PAIN: 0
COUGH: 0

## 2020-04-10 NOTE — PROGRESS NOTES
area  · 3/3/2020 - MRI of the brain with and without contrast documented a slightly decreased abnormal flair signal in the right frontoparietal region. Decrease in the enhancement with only subtle residual enhancement noted with no new regions of abnormal enhancement. Past Medical History:    Past Medical History:   Diagnosis Date    Cancer ([facility] [address]     brain    GERD (gastroesophageal reflux disease)     Hyperlipidemia     Hypertension     Seizure ([facility] [address] 07/03/2017       Past Surgical History:    Past Surgical History:   Procedure Laterality Date    COLONOSCOPY      TN SHLDR ARTHROSCOP,SURG,CAPSULORRHAPHY Left 7/2/2018    SHOULDER ARTHROSCOPIC BANKART REPAIR, ANTERIOR, POSTERIOR, AND INFERIOR LABRAL REPAIR performed by Modesta Llanos MD at [facility]       Current Medications:    Current Outpatient Medications   Medication Sig Dispense Refill    Omega-3 Fatty Acids (FISH OIL) 1000 MG CAPS Fish Oil 340 mg-1,000 mg capsule      aspirin (ASPIR-LOW) 81 MG EC tablet Aspir-Low 81 mg tablet,delayed release   Take 1 tablet every day by oral route.  divalproex (DEPAKOTE) 500 MG DR tablet Take 500 mg by mouth 3 times daily       levETIRAcetam (KEPPRA) 1000 MG tablet Take 1,000 mg by mouth 2 times daily       metoprolol succinate (TOPROL XL) 25 MG extended release tablet Take 25 mg by mouth      oxyCODONE-acetaminophen (PERCOCET) 7.5-325 MG per tablet oxycodone-acetaminophen 7.5 mg-325 mg tablet      promethazine (PHENERGAN) 12.5 MG tablet promethazine 12.5 mg tablet   one po q4h      vitamin B-6 (PYRIDOXINE) 100 MG tablet       venlafaxine (EFFEXOR XR) 150 MG extended release capsule venlafaxine  mg capsule,extended release 24 hr      temozolomide (TEMODAR) 100 MG chemo capsule TAKE 3 CAPSULES (300MG) BY MOUTH ONCE A DAY ON DAYS 1-5 OF EACH 28 DAYCYCLE.  15 capsule 4    lactulose 20 GM/30ML SOLN Take by mouth as needed      ondansetron (ZOFRAN) 4 MG tablet TAKE 2 TABLETS BY

## 2020-04-13 ENCOUNTER — APPOINTMENT (OUTPATIENT)
Dept: NEUROLOGY | Facility: HOSPITAL | Age: 55
End: 2020-04-13

## 2020-04-24 DIAGNOSIS — E87.6 HYPOKALEMIA: ICD-10-CM

## 2020-04-24 RX ORDER — ALPRAZOLAM 0.25 MG/1
0.25 TABLET ORAL 2 TIMES DAILY PRN
Qty: 60 TABLET | Refills: 0 | Status: SHIPPED | OUTPATIENT
Start: 2020-04-24 | End: 2020-01-01

## 2020-05-08 ENCOUNTER — OFFICE VISIT (OUTPATIENT)
Dept: HEMATOLOGY | Age: 55
End: 2020-05-08
Payer: MEDICAID

## 2020-05-08 ENCOUNTER — HOSPITAL ENCOUNTER (OUTPATIENT)
Dept: INFUSION THERAPY | Age: 55
Discharge: HOME | End: 2020-05-08
Payer: MEDICAID

## 2020-05-08 VITALS
HEIGHT: 68 IN | TEMPERATURE: 97.5 F | HEART RATE: 77 BPM | BODY MASS INDEX: 31.3 KG/M2 | OXYGEN SATURATION: 97 % | DIASTOLIC BLOOD PRESSURE: 120 MMHG | WEIGHT: 206.5 LBS | SYSTOLIC BLOOD PRESSURE: 160 MMHG

## 2020-05-08 DIAGNOSIS — E78.2 MIXED HYPERLIPIDEMIA: ICD-10-CM

## 2020-05-08 DIAGNOSIS — I10 ESSENTIAL HYPERTENSION: ICD-10-CM

## 2020-05-08 DIAGNOSIS — T45.1X5A ANEMIA ASSOCIATED WITH CHEMOTHERAPY: ICD-10-CM

## 2020-05-08 DIAGNOSIS — C71.9 ANAPLASTIC GLIOMA OF BRAIN (HCC): ICD-10-CM

## 2020-05-08 DIAGNOSIS — D64.81 ANEMIA ASSOCIATED WITH CHEMOTHERAPY: ICD-10-CM

## 2020-05-08 DIAGNOSIS — C71.9 ASTROCYTOMA (HCC): ICD-10-CM

## 2020-05-08 PROCEDURE — 99212 OFFICE O/P EST SF 10 MIN: CPT

## 2020-05-08 PROCEDURE — 99214 OFFICE O/P EST MOD 30 MIN: CPT | Performed by: NURSE PRACTITIONER

## 2020-05-08 PROCEDURE — 85025 COMPLETE CBC W/AUTO DIFF WBC: CPT

## 2020-05-08 PROCEDURE — 80053 COMPREHEN METABOLIC PANEL: CPT

## 2020-05-08 PROCEDURE — 36415 COLL VENOUS BLD VENIPUNCTURE: CPT

## 2020-05-08 RX ORDER — OXYCODONE AND ACETAMINOPHEN 10; 325 MG/1; MG/1
1 TABLET ORAL EVERY 4 HOURS PRN
Qty: 180 TABLET | Refills: 0 | Status: SHIPPED | OUTPATIENT
Start: 2020-05-08 | End: 2020-06-07

## 2020-05-08 RX ORDER — PROMETHAZINE HYDROCHLORIDE 12.5 MG/1
12.5 TABLET ORAL EVERY 4 HOURS PRN
Qty: 60 TABLET | Refills: 1 | Status: SHIPPED | OUTPATIENT
Start: 2020-05-08 | End: 2021-01-01 | Stop reason: HOSPADM

## 2020-05-08 RX ORDER — OXYCODONE AND ACETAMINOPHEN 10; 325 MG/1; MG/1
1 TABLET ORAL EVERY 4 HOURS PRN
Qty: 180 TABLET | Refills: 0 | Status: CANCELLED | OUTPATIENT
Start: 2020-05-08 | End: 2020-06-07

## 2020-05-08 ASSESSMENT — ENCOUNTER SYMPTOMS
EYE PAIN: 0
DIARRHEA: 0
WHEEZING: 0
VOMITING: 0
GASTROINTESTINAL NEGATIVE: 1
EYE DISCHARGE: 0
EYES NEGATIVE: 1
CONSTIPATION: 0
RESPIRATORY NEGATIVE: 1
SORE THROAT: 0
BACK PAIN: 0
SHORTNESS OF BREATH: 0
ABDOMINAL PAIN: 0
COUGH: 0
EYE REDNESS: 0
NAUSEA: 0
BLOOD IN STOOL: 0

## 2020-05-08 NOTE — PROGRESS NOTES
Progress Note      Pt Name: Fernando Hampton  YOB: 1965  MRN: [id number]    Date of evaluation: 5/8/2020  History Obtained From:  patient, electronic medical record    CHIEF COMPLAINT:    Chief Complaint   Patient presents with    Other     Astrocytoma    Follow-up    Discuss Labs    Oral Chemotherapy Monitoring    Medication Refill    Fatigue    Headache     HISTORY OF PRESENT ILLNESS:    Fernando Hampton is a 48 y.o.  male with significant PMH of anaplastic glioma, diagnosed April 2019. He continues to receive palliative Temodar 150 mg/m² days 1 through 5, 28-day regimen with anticipation of continuing until intolerance or progression of disease. Tom Barboza had a repeat MRI of the brain on 3/3/2020 that demonstrated no evidence of disease progression. He returns today in scheduled 4-week follow-up for evaluation, side effect monitoring and lab monitoring. Tom Barboza is accompanied again today by his mother whom is his primary caregiver. Tom Barboza continues to tolerate Temodar without significant difficulty other than nausea while taking the dosing. His nausea is overall controlled with promethazine. He continues to have chronic left-sided weakness and decreased organizational skills although these have significantly improved. He denies any seizure activity and continues to take his anticonvulsant medications as prescribed. He has complaint of headache and light sensitivity, he has a history of migraines. Today he is hypertensive with a BP of 160/120 in the right arm repeated with 15-minute interval and BP of 140/110 in the left arm. I contacted VLADIMIR Perez (primary care provider that manages his hypertension at [facility] in [address]) and discussed [place] hypertension.   Warrant recommended increasing his losartan to 100 mg in the morning and continuing metoprolol 25 mg in p.m. 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 Further molecular analysis at [facility] revealed IDH1/2 wild type, MGMT non-methylated, TERT mutation. Complex cytogenetics changes A maximum safe resection was not possible due to concerns of significant sequela. Second opinion was recommended at the [address] [place] [address]. · 5/17/2019-he was seen by Dr. Radha Payne. Recommended concurrent chemoradiation. · 5/24/2019-initial oncology consultation, Dr. Cassidy Kemp recommended concurrent chemoradiation with Temodar. · 6/3/2019. CT scan of the head without contrast documented to ill-defined masslike areas of increased attenuation within the right frontal lobe. Largest measuring 3.3 cm, previously measuring 1.8 cm and second lesion in the lateral aspect of the right frontal lobe measuring up to 1.5 cm. No intracranial hemorrhage or midline shift. · 6/3/2019- MRI of the brain with and without contrast, compared to 4/2/2019 documented masslike appearance at the right frontal lobe measuring 2.9 x 1.4 cm, previously measuring 1.7 x 1.1 cm with mass effect and possible extension into the corpus callosum. No midline shift. · 6/14/2019-CT scan cervical spine without contrast documented no evidence of acute bony injury. Multilevel disc degeneration spondylosis. · 6/12/2019- Initiated Temodar along with radiation therapy   · 6/17/2019- MRI of the brain with and without contrast documented 3 cm enhancing, partially necrotic tumor in the both the right corpus callosum body, consistent with known astrocytoma. Additional FLAIR signal on both sides of the right central sulcus with faint contrast enhancement in the precentral gyrus, compatible with tumor extension. No hemorrhage or brain herniation. · 6/21/2019 - CT scan of the head without contrast hypodense focus of the right frontal lobe measuring 3.3 cm. No intracranial hemorrhage. No abnormal extra-axial fluid collection. Right frontal craniotomy changes.    · 6/12/2019- 7/30/2019 Hypertension- BP of 160/120 in the right arm repeated with 15-minute interval and BP of 140/110 in the left arm. I contacted VLADIMIR Birch (primary care provider that manages his hypertension at [facility] in [address]) and discussed Al[place] hypertension. Warrant recommended increasing his losartan to 100 mg in the morning and continuing metoprolol 25 mg in p.m. and she also requested that he come by the clinic next week for BP monitoring. FOLLOW UP:  1. Follow-up appointment given for 4 weeks  2. Anticipate physical therapy in the near future. 3. Follow-up with Dr. Carla Munoz related to knee pain. 4. Follow-up with Dr. Josep Greenberg as scheduled  5. Follow-up with other medical providers as recommended  6. Monitor BP closely    Over 50% of the total visit time of 25 minutes in face to face encounter with the patient, out of which more than 50% of the time was spent in counseling patient  and coordination of care. Counseling included but was not limited to time spent reviewing labs, imaging studies/ treatment plan and answering questions. This does not include charting. Discussed precautions related to [address] and being at increased risk. Discussed proper handwashing to be done frequently, limit exposure to other individuals and maintain social distancing of 6 feet. Recommend contacting primary care provider if having respiratory symptoms for further recommendations and consideration for testing.     (Please note that portions of this note were completed with a voice recognition program. Efforts were made to edit the dictations but occasionally words are mis-transcribed.)      Electronically signed by VLADIMIR Moon on 5/8/2020 at 3:22 PM

## 2020-05-08 NOTE — TELEPHONE ENCOUNTER
Lauren Narvaez called and said Nishant is in her office and his bp is 160/120.  He complains of headache.  We will increase his losartan to 100 mg twice daily.  He has enough pills at home and Lauren said she will instruct him to increase his medication and come by clinic next week and check his bp

## 2020-05-11 RX ORDER — CHLORAL HYDRATE 500 MG
CAPSULE ORAL
Qty: 180 CAPSULE | Refills: 1 | Status: SHIPPED | OUTPATIENT
Start: 2020-05-11 | End: 2020-09-01

## 2020-05-11 RX ORDER — ONDANSETRON 4 MG/1
8 TABLET, FILM COATED ORAL EVERY 8 HOURS PRN
Qty: 30 TABLET | Refills: 2 | Status: SHIPPED | OUTPATIENT
Start: 2020-05-11 | End: 2020-10-23

## 2020-05-13 RX ORDER — LOSARTAN POTASSIUM 100 MG/1
100 TABLET ORAL DAILY
Qty: 90 TABLET | Refills: 1 | Status: SHIPPED | OUTPATIENT
Start: 2020-05-13 | End: 2020-01-01

## 2020-05-13 NOTE — TELEPHONE ENCOUNTER
Patient in office for blood pressure check. 132/80. Patient reports no headache or symptoms of hypertension. Mother reports patient is taking 100mg losartan daily (recent increase) and metoprolol 25mg at bedtime. Mother is asking for a refill on 100mg losartan daily.

## 2020-06-04 ENCOUNTER — OFFICE VISIT (OUTPATIENT)
Dept: NEUROSURGERY | Facility: CLINIC | Age: 55
End: 2020-06-04

## 2020-06-04 ENCOUNTER — HOSPITAL ENCOUNTER (OUTPATIENT)
Dept: MRI IMAGING | Facility: HOSPITAL | Age: 55
Discharge: HOME | End: 2020-06-04
Admitting: NEUROLOGICAL SURGERY

## 2020-06-04 ENCOUNTER — HOSPITAL ENCOUNTER (OUTPATIENT)
Dept: NEUROLOGY | Facility: HOSPITAL | Age: 55
Discharge: HOME | End: 2020-06-04

## 2020-06-04 VITALS
SYSTOLIC BLOOD PRESSURE: 126 MMHG | DIASTOLIC BLOOD PRESSURE: 74 MMHG | BODY MASS INDEX: 31.07 KG/M2 | HEIGHT: 68 IN | WEIGHT: 205 LBS

## 2020-06-04 DIAGNOSIS — C71.9 ASTROCYTOMA BRAIN TUMOR (HCC): Primary | ICD-10-CM

## 2020-06-04 DIAGNOSIS — Z78.9 NON-SMOKER: ICD-10-CM

## 2020-06-04 DIAGNOSIS — C71.9 ASTROCYTOMA BRAIN TUMOR (HCC): ICD-10-CM

## 2020-06-04 DIAGNOSIS — R56.9 SEIZURES (HCC): ICD-10-CM

## 2020-06-04 PROCEDURE — 99213 OFFICE O/P EST LOW 20 MIN: CPT | Performed by: NEUROLOGICAL SURGERY

## 2020-06-04 PROCEDURE — 70553 MRI BRAIN STEM W/O & W/DYE: CPT

## 2020-06-04 PROCEDURE — 0 GADOBENATE DIMEGLUMINE 529 MG/ML SOLUTION: Performed by: NEUROLOGICAL SURGERY

## 2020-06-04 PROCEDURE — 82565 ASSAY OF CREATININE: CPT

## 2020-06-04 PROCEDURE — A9577 INJ MULTIHANCE: HCPCS | Performed by: NEUROLOGICAL SURGERY

## 2020-06-04 PROCEDURE — 95816 EEG AWAKE AND DROWSY: CPT

## 2020-06-04 PROCEDURE — 95816 EEG AWAKE AND DROWSY: CPT | Performed by: PSYCHIATRY & NEUROLOGY

## 2020-06-04 RX ORDER — OXYCODONE AND ACETAMINOPHEN 10; 325 MG/1; MG/1
1 TABLET ORAL EVERY 4 HOURS PRN
Status: ON HOLD | COMMUNITY
Start: 2020-05-08 | End: 2020-01-01 | Stop reason: SDUPTHER

## 2020-06-04 RX ADMIN — GADOBENATE DIMEGLUMINE 20 ML: 529 INJECTION, SOLUTION INTRAVENOUS at 13:11

## 2020-06-04 NOTE — PROGRESS NOTES
SUBJECTIVE:  Patient ID: Lukasz Savage is a 55 y.o. male is here today for follow-up.    Chief Complaint: Anaplastic astrocytoma  Chief Complaint   Patient presents with   • Brain Tumor     patient is here for a 3 month follow up with MRI today @ [facility]  This is a 55-year-old male gentleman who went to the operating room and April 2019 for a stereotactic brain biopsy which did turn out to be an grade 3 anaplastic glioma.  He is on Temodar.  He has underwent IMRT radiation treatments.  He has had several issues with seizures since his diagnosis.   He is on Vimpat, Depakote, Keppra.  He has had no recent seizures.  He was scheduled to participate in a dedicated course of physical therapy for his gait and balance but that was delayed due to pandemic restrictions.  The following portions of the patient's history were reviewed and updated as appropriate: allergies, current medications, past family history, past medical history, past social history, past surgical history and problem list.    OBJECTIVE:    Review of Systems   Musculoskeletal: Positive for gait problem.   Neurological: Positive for seizures and weakness.   All other systems reviewed and are negative.         Physical Exam    Neurologic Exam  Mental Status   Oriented to person, place, and time.   Attention: normal.   Speech: speech is normal   Level of consciousness: alert  Knowledge: good.      Cranial Nerves   Cranial nerves II through XII intact.      Motor Exam   Muscle bulk: normal  Overall muscle tone: Some increased tone in the left upper extremity compared to the right.  Right arm pronator drift: absent  Left arm pronator drift: absentLeft upper extremity weakness distal greater than proximal.  Some ataxia with the left upper and lower extremity also      Sensory Exam   Light touch normal.   Pinprick normal.      Gait, Coordination, and Reflexes      Gait  Gait: (Mildly unsteady gait but walks without assistance)     Reflexes   Reflexes  2+ except as noted. Left upper extremity dysmetria and dysdiadochokinesia   Independent Review of Radiographic Studies:   MRI of the brain with and without contrast shows no new areas of enhancement.  FLAIR changes are stable.    ASSESSMENT/PLAN:  Neurologically and radiographically I think the patient is essentially stable.  I do think he would continue to benefit from a dedicated course of physical therapy for gait and balance training.  Now that the pandemic restrictions have been lifted we will reorder his therapy.  We will see him in follow-up in about 3 months with repeat imaging.      1. Astrocytoma brain tumor (CMS/HCC)    2. Seizures (CMS/HCC)    3. Non-smoker    4. BMI 31.0-31.9,adult            Return in about 3 months (around 9/4/2020) for follow up w/scan - DR MEJIA (BRAIN).      Dillon Mejia MD

## 2020-06-04 NOTE — PATIENT INSTRUCTIONS
"PATIENT TO CONTINUE TO FOLLOW UP WITH HIS PRIMARY CARE PROVIDER FOR YEARLY PHYSICAL EXAMS TO ENSURE COMPLETE HEALTH MAINTENANCE        BMI for Adults    Body mass index (BMI) is a number that is calculated from a person's weight and height. BMI may help to estimate how much of a person's weight is composed of fat. BMI can help identify those who may be at higher risk for certain medical problems.  How is BMI used with adults?  BMI is used as a screening tool to identify possible weight problems. It is used to check whether a person is obese, overweight, healthy weight, or underweight.  How is BMI calculated?  BMI measures your weight and compares it to your height. This can be done either in English (U.S[place] or metric measurements. Note that charts are available to help you find your BMI quickly and easily without having to do these calculations yourself.  To calculate your BMI in English (U.S[place] measurements, your health care provider will:  1. Measure your weight in pounds (lb).  2. Multiply the number of pounds by 703.  ? For example, for a person who weighs 180 lb, multiply that number by 703, which equals 126,540.  3. Measure your height in inches (in). Then multiply that number by itself to get a measurement called \"inches squared.\"  ? For example, for a person who is 70 in tall, the \"inches squared\" measurement is 70 in x 70 in, which equals 4900 inches squared.  4. Divide the total from Step 2 (number of lb x 703) by the total from Step 3 (inches squared): 126,540 ÷ 4900 = 25.8. This is your BMI.  To calculate your BMI in metric measurements, your health care provider will:  1. Measure your weight in kilograms (kg).  2. Measure your height in meters (m). Then multiply that number by itself to get a measurement called \"meters squared.\"  ? For example, for a person who is 1.75 m tall, the \"meters squared\" measurement is 1.75 m x 1.75 m, which is equal to 3.1 meters squared.  3. Divide the number of kilograms " (your weight) by the meters squared number. In this example: 70 ÷ 3.1 = 22.6. This is your BMI.  How is BMI interpreted?  To interpret your results, your health care provider will use BMI charts to identify whether you are underweight, normal weight, overweight, or obese. The following guidelines will be used:  · Underweight: BMI less than 18.5.  · Normal weight: BMI between 18.5 and 24.9.  · Overweight: BMI between 25 and 29.9.  · Obese: BMI of 30 and above.  Please note:  · Weight includes both fat and muscle, so someone with a muscular build, such as an athlete, may have a BMI that is higher than 24.9. In cases like these, BMI is not an accurate measure of body fat.  · To determine if excess body fat is the cause of a BMI of 25 or higher, further assessments may need to be done by a health care provider.  · BMI is usually interpreted in the same way for men and women.  Why is BMI a useful tool?  BMI is useful in two ways:  · Identifying a weight problem that may be related to a medical condition, or that may increase the risk for medical problems.  · Promoting lifestyle and diet changes in order to reach a healthy weight.  Summary  · Body mass index (BMI) is a number that is calculated from a person's weight and height.  · BMI may help to estimate how much of a person's weight is composed of fat. BMI can help identify those who may be at higher risk for certain medical problems.  · BMI can be measured using English measurements or metric measurements.  · To interpret your results, your health care provider will use BMI charts to identify whether you are underweight, normal weight, overweight, or obese.  This information is not intended to replace advice given to you by your health care provider. Make sure you discuss any questions you have with your health care provider.  Document Released: 08/29/2005 Document Revised: 11/30/2018 Document Reviewed: 10/31/2018  Elsevier Patient Education © 2020 [employer]

## 2020-06-10 LAB — CREAT BLDA-MCNC: 1 MG/DL (ref 0.6–1.3)

## 2020-06-12 ENCOUNTER — OFFICE VISIT (OUTPATIENT)
Dept: HEMATOLOGY | Age: 55
End: 2020-06-12
Payer: MEDICAID

## 2020-06-12 ENCOUNTER — HOSPITAL ENCOUNTER (OUTPATIENT)
Dept: INFUSION THERAPY | Age: 55
Discharge: HOME | End: 2020-06-12
Payer: MEDICAID

## 2020-06-12 VITALS
HEIGHT: 68 IN | DIASTOLIC BLOOD PRESSURE: 80 MMHG | OXYGEN SATURATION: 97 % | BODY MASS INDEX: 30.68 KG/M2 | WEIGHT: 202.4 LBS | SYSTOLIC BLOOD PRESSURE: 152 MMHG | TEMPERATURE: 98.1 F | HEART RATE: 74 BPM

## 2020-06-12 DIAGNOSIS — F33.1 MODERATE EPISODE OF RECURRENT MAJOR DEPRESSIVE DISORDER (HCC): ICD-10-CM

## 2020-06-12 DIAGNOSIS — T45.1X5A ANEMIA ASSOCIATED WITH CHEMOTHERAPY: ICD-10-CM

## 2020-06-12 DIAGNOSIS — D64.81 ANEMIA ASSOCIATED WITH CHEMOTHERAPY: ICD-10-CM

## 2020-06-12 LAB
ALBUMIN SERPL-MCNC: 3.9 G/DL (ref 3.5–5.2)
ALP BLD-CCNC: 56 U/L (ref 40–130)
ALT SERPL-CCNC: 30 U/L (ref 21–72)
ANION GAP SERPL CALCULATED.3IONS-SCNC: 6 MMOL/L (ref 7–19)
AST SERPL-CCNC: 33 U/L (ref 17–59)
BASOPHILS ABSOLUTE: 0.03 K/UL (ref 0.01–0.08)
BASOPHILS RELATIVE PERCENT: 0.5 % (ref 0.1–1.2)
BILIRUB SERPL-MCNC: 0.4 MG/DL (ref 0.2–1.3)
BUN BLDV-MCNC: 15 MG/DL (ref 9–20)
CALCIUM SERPL-MCNC: 8.9 MG/DL (ref 8.4–10.2)
CHLORIDE BLD-SCNC: 106 MMOL/L (ref 98–111)
CO2: 30 MMOL/L (ref 22–29)
CREAT SERPL-MCNC: 0.8 MG/DL (ref 0.6–1.2)
EOSINOPHILS ABSOLUTE: 0.14 K/UL (ref 0.04–0.54)
EOSINOPHILS RELATIVE PERCENT: 2.2 % (ref 0.7–7)
GFR NON-AFRICAN AMERICAN: >60
GLUCOSE BLD-MCNC: 60 MG/DL (ref 74–106)
HCT VFR BLD CALC: 39.8 % (ref 40.1–51)
HEMOGLOBIN: 13 G/DL (ref 13.7–17.5)
LYMPHOCYTES ABSOLUTE: 2.03 K/UL (ref 1.18–3.74)
LYMPHOCYTES RELATIVE PERCENT: 32.3 % (ref 19.3–53.1)
MCH RBC QN AUTO: 32.3 PG (ref 25.7–32.2)
MCHC RBC AUTO-ENTMCNC: 32.7 G/DL (ref 32.3–36.5)
MCV RBC AUTO: 99 FL (ref 79–92.2)
MONOCYTES ABSOLUTE: 0.79 K/UL (ref 0.24–0.82)
MONOCYTES RELATIVE PERCENT: 12.6 % (ref 4.7–12.5)
NEUTROPHILS ABSOLUTE: 3.29 K/UL (ref 1.56–6.13)
NEUTROPHILS RELATIVE PERCENT: 52.4 % (ref 34–71.1)
PDW BLD-RTO: 13.3 % (ref 11.6–14.4)
PLATELET # BLD: 183 K/UL (ref 163–337)
PMV BLD AUTO: 9.2 FL (ref 7.4–10.4)
POTASSIUM SERPL-SCNC: 4.4 MMOL/L (ref 3.5–5.1)
RBC # BLD: 4.02 M/UL (ref 4.63–6.08)
SODIUM BLD-SCNC: 142 MMOL/L (ref 137–145)
TOTAL PROTEIN: 6.2 G/DL (ref 6.3–8.2)
WBC # BLD: 6.28 K/UL (ref 4.23–9.07)

## 2020-06-12 PROCEDURE — 36415 COLL VENOUS BLD VENIPUNCTURE: CPT | Performed by: NURSE PRACTITIONER

## 2020-06-12 PROCEDURE — 99211 OFF/OP EST MAY X REQ PHY/QHP: CPT

## 2020-06-12 PROCEDURE — 36415 COLL VENOUS BLD VENIPUNCTURE: CPT

## 2020-06-12 PROCEDURE — 99213 OFFICE O/P EST LOW 20 MIN: CPT | Performed by: NURSE PRACTITIONER

## 2020-06-12 PROCEDURE — 80053 COMPREHEN METABOLIC PANEL: CPT

## 2020-06-12 PROCEDURE — 85025 COMPLETE CBC W/AUTO DIFF WBC: CPT

## 2020-06-12 RX ORDER — OXYCODONE AND ACETAMINOPHEN 10; 325 MG/1; MG/1
1 TABLET ORAL EVERY 4 HOURS PRN
Qty: 180 TABLET | Refills: 0 | Status: SHIPPED | OUTPATIENT
Start: 2020-06-12 | End: 2020-07-12

## 2020-06-12 RX ORDER — OXYCODONE AND ACETAMINOPHEN 10; 325 MG/1; MG/1
1 TABLET ORAL EVERY 4 HOURS PRN
Qty: 180 TABLET | Refills: 0
Start: 2020-06-12 | End: 2020-07-10 | Stop reason: SDUPTHER

## 2020-06-12 RX ORDER — FLUTICASONE PROPIONATE 50 MCG
2 SPRAY, SUSPENSION (ML) NASAL DAILY
Qty: 1 BOTTLE | Refills: 1 | Status: SHIPPED | OUTPATIENT
Start: 2020-06-12

## 2020-06-12 RX ORDER — VENLAFAXINE HYDROCHLORIDE 150 MG/1
CAPSULE, EXTENDED RELEASE ORAL
Qty: 90 CAPSULE | Refills: 1 | Status: SHIPPED | OUTPATIENT
Start: 2020-06-12 | End: 2020-07-16

## 2020-06-12 ASSESSMENT — ENCOUNTER SYMPTOMS
BLOOD IN STOOL: 0
CONSTIPATION: 0
DIARRHEA: 0
RESPIRATORY NEGATIVE: 1
SHORTNESS OF BREATH: 0
WHEEZING: 0
BACK PAIN: 0
SORE THROAT: 0
EYE PAIN: 0
EYE REDNESS: 0
VOMITING: 0
ABDOMINAL PAIN: 0
EYES NEGATIVE: 1
EYE DISCHARGE: 0
COUGH: 0

## 2020-06-12 NOTE — PROGRESS NOTES
Progress Note      Pt Name: Brigette Solares  YOB: 1965  MRN: [id number]    Date of evaluation: 6/12/2020  History Obtained From:  patient, electronic medical record    CHIEF COMPLAINT:    Chief Complaint   Patient presents with    Follow-up     Anaplastic glioma of brain      HISTORY OF PRESENT ILLNESS:    Brigette Solares is a 48 y.o.  male with significant PMH of anaplastic glioma, diagnosed April 2019. Ramez Jimenez was placed on palliative Temodar on 8/29/2019 and has continued to tolerate well and appears to be having a positive response. He is currently taking Temodar 150 mg/m² days 1 through 5 every 28 days and will continue until intolerance or disease progression. Ramez Jimenez is also closely followed by Dr. Rona Schrader and had a repeat MRI of the brain on 6/4/2020 that continued to show no residual enhancement of the lesion above the right corpus callosum. He returns today in follow up for evaluation, side effect monitoring, lab monitoring and consideration to continue Temodar. Ramez Jimenez is accompanied by his mother who is his primary caregiver. Ramez Jimenez presents today indicating that overall he is doing well other than his chronic bilateral knee pain and he has been experiencing increasing headaches with nasal congestion. He denied any shortness of breath, cough or respiratory complaints, suspect to the nasal congestion and possibly the headaches are being enhanced by sinus congestion, recommended over-the-counter Zyrtec and sent a prescription for Flonase (both are compatible with Temodar). He reports he continues to tolerate the Temodar without significant difficulty other than the nausea which is overall controlled with promethazine. He continues to have chronic left-sided weakness and decreased organizational skills although these have significant improved and not change from previous evaluation.   He denies any seizure activity and continues to take his anticonvulsant medications as prescribed. Tom Barboza has been experiencing some episodes of hypertension, initial /100 and repeat BP 15 minutes after relaxing of 152/80. VLADIMIR Perez has been making adjustments with his metoprolol and losartan. I encouraged close monitoring of BP and if continues to have elevation need to contact Ms. sEquivel for further recommendations. CBC today is within normal limits. CMP on 5/8/2020 was within normal limits. Tom Barboza was evaluated by Dr. Marcy Brown on 6/4/2020 and recommendation was continue with current plan of care and scheduled 3-month repeat MRI of the brain and follow-up. 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 · 8/29/2019- anticipate initiating Temadar 150 mg/m2 days 1-5 on a 28 day regimen     Cancer history  Mr Fernando Hampton was seen in initial oncology consultation by Dr. Gagandeep Stone on 5/24/2019 referred by Dr. Marcy Brown for diagnosis of primary brain tumor, grade 3 astrocytoma. Tom Barboza was being seen by neurology with complaints of left facial and upper extremity. · 4/2/2019-MRI brain with contrast showed changes in the frontal convexity with a fullness of the sulcal gyral pattern. Specifically, 4.5 x 4.5 x 3.5 cm right frontal lesion. Second, discrete hyperintense nodule measuring 1.1 x 1.9 x 1.5 cm in the subcortical white matter of the paramedian posterior right frontal lobe immediately above the corpus callosum. This was concerning for high-grade glioma. · 4/15/2019-he underwent a craniotomy with stereotactic biopsy by Dr. Dudley Pedroza at [facility]. 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  Further molecular analysis at [facility] revealed IDH1/2 wild type, MGMT non-methylated, TERT mutation. Complex cytogenetics changes A maximum safe resection was not possible due to concerns of significant sequela. Second opinion was recommended at the [facility]. · 5/17/2019-he was seen by Dr. Fabain Robins. Recommended concurrent chemoradiation. · 5/24/2019-initial oncology consultation, Dr. Magen Kidd recommended concurrent chemoradiation with Temodar. · 6/3/2019. CT scan of the head without contrast documented to ill-defined masslike areas of increased attenuation within the right frontal lobe. Largest measuring 3.3 cm, previously measuring 1.8 cm and second lesion in the lateral aspect of the right frontal lobe measuring up to 1.5 cm. No intracranial hemorrhage or midline shift. · 6/3/2019- MRI of the brain with and without contrast, compared to 4/2/2019 documented masslike appearance at the right frontal lobe measuring 2.9 x 1.4 cm, previously measuring 1.7 x 1.1 cm with mass effect and possible extension into the corpus callosum. No midline shift. · 6/14/2019-CT scan cervical spine without contrast documented no evidence of acute bony injury. Multilevel disc degeneration spondylosis. · 6/12/2019- Initiated Temodar along with radiation therapy   · 6/17/2019- MRI of the brain with and without contrast documented 3 cm enhancing, partially necrotic tumor in the both the right corpus callosum body, consistent with known astrocytoma. Additional FLAIR signal on both sides of the right central sulcus with faint contrast enhancement in the precentral gyrus, compatible with tumor extension. No hemorrhage or brain herniation. · 6/21/2019 - CT scan of the head without contrast hypodense focus of the right frontal lobe measuring 3.3 cm. No intracranial hemorrhage. No abnormal extra-axial fluid collection. Right frontal craniotomy changes. · 6/12/2019- 7/30/2019 -Chemoradiation for a total of 6000 cGy with Temodar daily.   · 8/27/2019- MRI of the brain with and without contrast at hospitals revealed interval decrease in size and mass effect of the solid enhancing nodule now measuring 7 mm compared to 2 cm, suggesting a positive response to treatment. Interval increasing in FLAIR abnormality in the lateral ventricles is suspected to be induced by early radiation changes. No new enhancing nodule in this area  · 3/3/2020 - MRI of the brain with and without contrast documented a slightly decreased abnormal flair signal in the right frontoparietal region. Decrease in the enhancement with only subtle residual enhancement noted with no new regions of abnormal enhancement. · 6/4/2020-MRI of the brain with and without contrast documented residual gliosis in the right parietal white matter. No residual enhancement of the lesion above the right corpus callosum. Past Medical History:    Past Medical History:   Diagnosis Date    Cancer ([facility] [address]     brain    GERD (gastroesophageal reflux disease)     Hyperlipidemia     Hypertension     Seizure ([facility] [address] 07/03/2017       Past Surgical History:    Past Surgical History:   Procedure Laterality Date    COLONOSCOPY      ME SHLDR ARTHROSCOP,SURG,CAPSULORRHAPHY Left 7/2/2018    SHOULDER ARTHROSCOPIC BANKART REPAIR, ANTERIOR, POSTERIOR, AND INFERIOR LABRAL REPAIR [place] [facility]       Current Medications:    Current Outpatient Medications   Medication Sig Dispense Refill    fluticasone (FLONASE) 50 MCG/ACT nasal spray 2 sprays by Each Nostril route daily 1 Bottle 1    ondansetron (ZOFRAN) 4 MG tablet TAKE 2 TABLETS BY MOUTH EVERY 8 HOURS AS NEEDED FOR NAUSEA OR VOMITING 30 tablet 2    Omega-3 Fatty Acids (FISH OIL) 1000 MG CAPS Fish Oil 340 mg-1,000 mg capsule      aspirin (ASPIR-LOW) 81 MG EC tablet Aspir-Low 81 mg tablet,delayed release   Take 1 tablet every day by oral route.       divalproex (DEPAKOTE) 500 MG DR tablet Take 500 mg by mouth 3 times daily       levETIRAcetam (KEPPRA) 1000 MG Normal range of motion and neck supple. Thyroid: No thyroid mass or thyromegaly. Vascular: No JVD. Trachea: Trachea normal. No tracheal deviation. Cardiovascular:      Rate and Rhythm: Normal rate and regular rhythm. Heart sounds: Normal heart sounds. No murmur. No friction rub. No gallop. Pulmonary:      Effort: Pulmonary effort is normal. No respiratory distress. Breath sounds: Normal breath sounds. No wheezing or rales. Chest:      Chest wall: No tenderness. Abdominal:      General: Bowel sounds are normal. There is no distension. Palpations: Abdomen is soft. There is no mass. Tenderness: There is no abdominal tenderness. There is no guarding or rebound. Hernia: No hernia is present. Musculoskeletal:         General: No tenderness or deformity. Comments: Range of motion within normal limits x4 extremities  Left-sided weakness, chronic   Skin:     General: Skin is warm. Coloration: Skin is not pale. Findings: No erythema or rash. Neurological:      Mental Status: He is alert and oriented to person, place, and time. Cranial Nerves: No cranial nerve deficit. Coordination: Coordination normal.   Psychiatric:         Behavior: Behavior normal.         Thought Content: Thought content normal.         Labs: WBC 6.28, ANC 3.29, hemoglobin 13, MCV 99 and platelet count 460,099    CBC: No results for input(s): WBC, HGB, PLT in the last 72 hours. CMP: No results for input(s): GLUCOSE, BUN, CREATININE, BCR, CO2, CALCIUM, ALBUMIN, LABIL2, ALKPHOS, AST, ALT in the last 72 hours. Invalid input(s): EGFRIFNONA, EGFRIFAFRI, SODIUM, POTASSIUM, CHLORIDE, PROTENTOTREF, GLOBULIN, BILIRUBIN  Hepatic: No results for input(s): AST, ALT, ALB, BILITOT, ALKPHOS in the last 72 hours. Troponin: No results for input(s): TROPONINI in the last 72 hours. BNP: No results for input(s): BNP in the last 72 hours.   Lipids: No results for input(s): CHOL, HDL in the last made to edit the dictations but occasionally words are mis-transcribed.)    Electronically signed by VLADIMIR Sue on 6/16/2020 at 10:01 AM

## 2020-06-16 PROBLEM — R51.9 NONINTRACTABLE EPISODIC HEADACHE: Status: ACTIVE | Noted: 2020-06-16

## 2020-06-16 ASSESSMENT — ENCOUNTER SYMPTOMS: NAUSEA: 1

## 2020-06-23 DIAGNOSIS — R56.9 SEIZURES (HCC): Primary | ICD-10-CM

## 2020-06-23 DIAGNOSIS — C71.9 ASTROCYTOMA BRAIN TUMOR (HCC): ICD-10-CM

## 2020-06-24 RX ORDER — LEVETIRACETAM 250 MG/1
TABLET ORAL
Qty: 60 TABLET | Refills: 5 | Status: SHIPPED | OUTPATIENT
Start: 2020-06-24 | End: 2020-01-01

## 2020-06-24 RX ORDER — LEVETIRACETAM 1000 MG/1
TABLET ORAL
Qty: 60 TABLET | Refills: 5 | Status: SHIPPED | OUTPATIENT
Start: 2020-06-24 | End: 2020-01-01

## 2020-06-26 ENCOUNTER — HOSPITAL ENCOUNTER (OUTPATIENT)
Dept: GENERAL RADIOLOGY | Facility: HOSPITAL | Age: 55
Discharge: HOME | End: 2020-06-26
Admitting: FAMILY MEDICINE

## 2020-06-26 ENCOUNTER — HOSPITAL ENCOUNTER (OUTPATIENT)
Dept: GENERAL RADIOLOGY | Facility: HOSPITAL | Age: 55
Discharge: HOME | End: 2020-06-26

## 2020-06-26 ENCOUNTER — OFFICE VISIT (OUTPATIENT)
Dept: FAMILY MEDICINE CLINIC | Facility: CLINIC | Age: 55
End: 2020-06-26

## 2020-06-26 VITALS
DIASTOLIC BLOOD PRESSURE: 78 MMHG | BODY MASS INDEX: 30.31 KG/M2 | RESPIRATION RATE: 18 BRPM | WEIGHT: 200 LBS | OXYGEN SATURATION: 98 % | HEART RATE: 76 BPM | TEMPERATURE: 97.7 F | HEIGHT: 68 IN | SYSTOLIC BLOOD PRESSURE: 156 MMHG

## 2020-06-26 DIAGNOSIS — R35.0 FREQUENCY OF URINATION: Primary | ICD-10-CM

## 2020-06-26 DIAGNOSIS — M54.50 ACUTE LEFT-SIDED LOW BACK PAIN WITHOUT SCIATICA: ICD-10-CM

## 2020-06-26 DIAGNOSIS — R56.9 SEIZURES (HCC): ICD-10-CM

## 2020-06-26 DIAGNOSIS — C71.9 ASTROCYTOMA BRAIN TUMOR (HCC): ICD-10-CM

## 2020-06-26 DIAGNOSIS — G81.94 LEFT HEMIPARESIS (HCC): ICD-10-CM

## 2020-06-26 DIAGNOSIS — F32.1 CURRENT MODERATE EPISODE OF MAJOR DEPRESSIVE DISORDER WITHOUT PRIOR EPISODE (HCC): ICD-10-CM

## 2020-06-26 DIAGNOSIS — M54.6 ACUTE LEFT-SIDED THORACIC BACK PAIN: ICD-10-CM

## 2020-06-26 DIAGNOSIS — R32 URINARY INCONTINENCE, UNSPECIFIED TYPE: ICD-10-CM

## 2020-06-26 LAB
BILIRUB BLD-MCNC: ABNORMAL MG/DL
CLARITY, POC: CLEAR
COLOR UR: YELLOW
GLUCOSE UR STRIP-MCNC: NEGATIVE MG/DL
KETONES UR QL: ABNORMAL
LEUKOCYTE EST, POC: NEGATIVE
NITRITE UR-MCNC: NEGATIVE MG/ML
PH UR: 7.5 [PH] (ref 5–8)
PROT UR STRIP-MCNC: NEGATIVE MG/DL
RBC # UR STRIP: NEGATIVE /UL
SP GR UR: 1.02 (ref 1–1.03)
UROBILINOGEN UR QL: ABNORMAL

## 2020-06-26 PROCEDURE — 99214 OFFICE O/P EST MOD 30 MIN: CPT | Performed by: FAMILY MEDICINE

## 2020-06-26 PROCEDURE — 72072 X-RAY EXAM THORAC SPINE 3VWS: CPT

## 2020-06-26 PROCEDURE — 72100 X-RAY EXAM L-S SPINE 2/3 VWS: CPT

## 2020-06-26 NOTE — PROGRESS NOTES
Subjective cc: urinary incontinence   Lukasz Savage is a 55 y.o. 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   He can get up to go urinate but very quickly afterward he has to go again. He is now wearing pull up at night because he cannot make it all the way to the bathroom before urinating. This started about 2-3 weeks ago but it is worsening.     He does have pain in his lower back - started a few weeks ago as well - worse on left side.   He does notice his urine is dark in color.   He is drinking plenty of water.   He has fallen a couple times.     Sometimes he will have difficulty talking and moving his left side. It will happen intermittently. His left side is chronically weaker. They were concerned it could be due to his medications - but it happens even when he has not had his medicaiton.  He is aware when it is happening.  He has an appt to see neuro next week. He has had a repeat MRi of the brain a few weeks ago and it looked good.     He also complains of worsening depression - he is currently taking effexor and xanax PRN without much difference.     Urinary Tract Infection    This is a new problem. The current episode started 1 to 4 weeks ago. The problem occurs every urination. The problem has been gradually worsening. There has been no fever. Associated symptoms include flank pain, frequency, hesitancy and urgency. Pertinent negatives include no chills, discharge, hematuria, nausea, sweats or vomiting. He has tried nothing for the symptoms. The treatment provided no relief.        The following portions of the patient's history were reviewed and updated as appropriate: allergies, current medications, past family history, past medical history, past social history, past surgical history and problem list.        Review of Systems   Constitutional: Negative for activity change,  "appetite change and chills.   Gastrointestinal: Negative for nausea and vomiting.   Genitourinary: Positive for flank pain, frequency, hesitancy and urgency. Negative for hematuria.   Musculoskeletal: Positive for arthralgias, back pain and myalgias.   Neurological: Positive for speech difficulty and weakness. Negative for syncope.   Psychiatric/Behavioral: Positive for dysphoric mood.   All other systems reviewed and are negative.      Objective   Blood pressure 156/78, pulse 76, temperature 97.7 °F (36.5 °C), temperature source Infrared, resp. rate 18, height 172.7 cm (68\"), weight 90.7 kg (200 lb), SpO2 98 %.  Physical Exam   Constitutional: He is oriented to person, place, and time. He appears well-developed and well-nourished. No distress.   HENT:   Head: Normocephalic.   Right Ear: External ear normal.   Left Ear: External ear normal.   Eyes: Conjunctivae and EOM are normal. Right eye exhibits no discharge. Left eye exhibits no discharge.   Cardiovascular: Normal rate, regular rhythm, normal heart sounds and intact distal pulses.   Pulmonary/Chest: Effort normal and breath sounds normal. No stridor. No respiratory distress.   Musculoskeletal: He exhibits tenderness.        Thoracic back: He exhibits tenderness and pain.        Lumbar back: He exhibits tenderness and pain.        Back:    Neurological: He is alert and oriented to person, place, and time. He exhibits abnormal muscle tone. Coordination abnormal.   Skin: Skin is warm and dry. He is not diaphoretic.   Psychiatric: He has a normal mood and affect. His behavior is normal. Judgment and thought content normal.   Nursing note and vitals reviewed.    Lab Results (last 24 hours)     Procedure Component Value Units Date/Time    POCT urinalysis dipstick, multipro [[id number]]  (Abnormal) Collected:  06/26/20 1507    Specimen:  Urine Updated:  06/26/20 1507     Color Yellow     Clarity, UA Clear     Glucose, UA Negative mg/dL      Bilirubin Small (1+)     " Ketones, UA Trace     Specific Gravity  1.020     Blood, UA Negative     pH, Urine 7.5     Protein, POC Negative mg/dL      Urobilinogen, UA 4 E.U./dL      Nitrite, UA Negative     Leukocytes Negative            Assessment/Plan   Problems Addressed this Visit        Nervous and Auditory    Seizures (CMS/[facility])    Left hemiparesis (CMS/[facility])    Astrocytoma brain tumor (CMS/[facility])    Relevant Orders    MRI Lumbar Spine With & Without Contrast      Other Visit Diagnoses     Frequency of urination    -  Primary    Relevant Orders    POCT urinalysis dipstick, multipro (Completed)    Urine Culture - Urine, Urine, Clean Catch    Urinary incontinence, unspecified type        Relevant Orders    XR Spine Thoracic 3 View (In Office) (Completed)    XR Spine Lumbar 2 or 3 View (In Office) (Completed)    Miscellaneous DME    MRI Lumbar Spine With & Without Contrast    Acute left-sided low back pain without sciatica        Relevant Orders    XR Spine Lumbar 2 or 3 View (In Office) (Completed)    MRI Lumbar Spine With & Without Contrast    Acute left-sided thoracic back pain        Relevant Orders    XR Spine Thoracic 3 View (In Office) (Completed)    Current moderate episode of major depressive disorder without prior episode (CMS/[facility])        Relevant Orders    Ambulatory Referral to Behavioral Health        PLAN:     #1 urinary urgency/back pain: new, needs further evaluation, reviewed UA, not consistent with infection but will send for culture to be certain. Concern for development of neurogenic bladder and possibilty of nerve damage versus new tumor - will get XR of spine today, likely need additional imaging of spine as well to rule out tumor or other spinal cord injury    #2 depression: chronic, worsening, continue on current medications, will refer to counseling for further eval - I think this is better htan changing his medications at this time especially with the other problems he is having     #3 impaired speech and weakness:  new, concern for TIA vs seizure activity - reviewed records, advised to keep appt with neuro next week for further eval - if symtpoms develop again recommend going directly to ED for further eval in case this could be a TIA.     Greater than 25 minutes spent face to face with pt discussing concerns and treatment plan           This document has been electronically signed by Malia Vargas MD on June 26, 2020 17:44

## 2020-06-28 LAB
BACTERIA UR CULT: NORMAL
BACTERIA UR CULT: NORMAL

## 2020-06-29 DIAGNOSIS — R35.0 FREQUENCY OF URINATION: Primary | ICD-10-CM

## 2020-06-29 DIAGNOSIS — R32 URINARY INCONTINENCE, UNSPECIFIED TYPE: ICD-10-CM

## 2020-07-02 ENCOUNTER — OFFICE VISIT (OUTPATIENT)
Dept: NEUROLOGY | Facility: CLINIC | Age: 55
End: 2020-07-02

## 2020-07-02 VITALS
HEART RATE: 74 BPM | OXYGEN SATURATION: 99 % | WEIGHT: 198 LBS | SYSTOLIC BLOOD PRESSURE: 124 MMHG | DIASTOLIC BLOOD PRESSURE: 82 MMHG | BODY MASS INDEX: 30.01 KG/M2 | HEIGHT: 68 IN

## 2020-07-02 DIAGNOSIS — Z74.3 REQUIRES CONTINUOUS SUPERVISION FOR ACTIVITIES OF DAILY LIVING (ADL): ICD-10-CM

## 2020-07-02 DIAGNOSIS — G81.94 LEFT HEMIPARESIS (HCC): ICD-10-CM

## 2020-07-02 DIAGNOSIS — R56.9 SEIZURES (HCC): ICD-10-CM

## 2020-07-02 DIAGNOSIS — C71.9 ASTROCYTOMA BRAIN TUMOR (HCC): Primary | ICD-10-CM

## 2020-07-02 DIAGNOSIS — G47.33 OBSTRUCTIVE SLEEP APNEA: ICD-10-CM

## 2020-07-02 DIAGNOSIS — R35.1 BPH ASSOCIATED WITH NOCTURIA: ICD-10-CM

## 2020-07-02 DIAGNOSIS — N40.1 BPH ASSOCIATED WITH NOCTURIA: ICD-10-CM

## 2020-07-02 DIAGNOSIS — Z92.3 HISTORY OF RADIATION THERAPY: ICD-10-CM

## 2020-07-02 PROCEDURE — 99214 OFFICE O/P EST MOD 30 MIN: CPT | Performed by: PHYSICIAN ASSISTANT

## 2020-07-02 RX ORDER — TEMOZOLOMIDE 100 MG/1
CAPSULE ORAL
Qty: 15 CAPSULE | Refills: 4 | Status: SHIPPED | OUTPATIENT
Start: 2020-07-02 | End: 2020-12-02

## 2020-07-02 RX ORDER — TAMSULOSIN HYDROCHLORIDE 0.4 MG/1
1 CAPSULE ORAL DAILY
Qty: 30 CAPSULE | Refills: 3 | Status: SHIPPED | OUTPATIENT
Start: 2020-07-02 | End: 2020-08-12

## 2020-07-06 RX ORDER — LACOSAMIDE 150 MG/1
TABLET, FILM COATED ORAL
Qty: 60 TABLET | Refills: 2 | OUTPATIENT
Start: 2020-07-06 | End: 2020-09-25

## 2020-07-08 ENCOUNTER — HOSPITAL ENCOUNTER (OUTPATIENT)
Dept: MRI IMAGING | Facility: HOSPITAL | Age: 55
Discharge: HOME | End: 2020-07-08
Admitting: FAMILY MEDICINE

## 2020-07-08 DIAGNOSIS — R32 URINARY INCONTINENCE, UNSPECIFIED TYPE: ICD-10-CM

## 2020-07-08 DIAGNOSIS — M54.50 ACUTE LEFT-SIDED LOW BACK PAIN WITHOUT SCIATICA: ICD-10-CM

## 2020-07-08 DIAGNOSIS — C71.9 ASTROCYTOMA BRAIN TUMOR (HCC): ICD-10-CM

## 2020-07-08 LAB — CREAT BLDA-MCNC: 1 MG/DL (ref 0.6–1.3)

## 2020-07-08 PROCEDURE — A9577 INJ MULTIHANCE: HCPCS | Performed by: FAMILY MEDICINE

## 2020-07-08 PROCEDURE — 72158 MRI LUMBAR SPINE W/O & W/DYE: CPT

## 2020-07-08 PROCEDURE — 0 GADOBENATE DIMEGLUMINE 529 MG/ML SOLUTION: Performed by: FAMILY MEDICINE

## 2020-07-08 PROCEDURE — 82565 ASSAY OF CREATININE: CPT

## 2020-07-08 RX ADMIN — GADOBENATE DIMEGLUMINE 17 ML: 529 INJECTION, SOLUTION INTRAVENOUS at 12:26

## 2020-07-10 ENCOUNTER — OFFICE VISIT (OUTPATIENT)
Dept: HEMATOLOGY | Age: 55
End: 2020-07-10
Payer: MEDICAID

## 2020-07-10 ENCOUNTER — HOSPITAL ENCOUNTER (OUTPATIENT)
Dept: INFUSION THERAPY | Age: 55
Discharge: HOME | End: 2020-07-10
Payer: MEDICAID

## 2020-07-10 VITALS
HEART RATE: 68 BPM | HEIGHT: 68 IN | WEIGHT: 195.1 LBS | OXYGEN SATURATION: 98 % | SYSTOLIC BLOOD PRESSURE: 160 MMHG | TEMPERATURE: 98 F | BODY MASS INDEX: 29.57 KG/M2 | DIASTOLIC BLOOD PRESSURE: 90 MMHG

## 2020-07-10 DIAGNOSIS — T45.1X5A ANEMIA ASSOCIATED WITH CHEMOTHERAPY: ICD-10-CM

## 2020-07-10 DIAGNOSIS — R56.9 SEIZURES (HCC): ICD-10-CM

## 2020-07-10 DIAGNOSIS — D64.81 ANEMIA ASSOCIATED WITH CHEMOTHERAPY: ICD-10-CM

## 2020-07-10 DIAGNOSIS — C71.9 ANAPLASTIC GLIOMA OF BRAIN (HCC): ICD-10-CM

## 2020-07-10 LAB
ALBUMIN SERPL-MCNC: 3.8 G/DL (ref 3.5–5.2)
ALP BLD-CCNC: 57 U/L (ref 40–130)
ALT SERPL-CCNC: 31 U/L (ref 21–72)
ANION GAP SERPL CALCULATED.3IONS-SCNC: 5 MMOL/L (ref 7–19)
AST SERPL-CCNC: 24 U/L (ref 17–59)
BASOPHILS ABSOLUTE: 0.01 K/UL (ref 0.01–0.08)
BASOPHILS RELATIVE PERCENT: 0.1 % (ref 0.1–1.2)
BILIRUB SERPL-MCNC: 0.6 MG/DL (ref 0.2–1.3)
BUN BLDV-MCNC: 23 MG/DL (ref 9–20)
CALCIUM SERPL-MCNC: 9.1 MG/DL (ref 8.4–10.2)
CHLORIDE BLD-SCNC: 106 MMOL/L (ref 98–111)
CO2: 30 MMOL/L (ref 22–29)
CREAT SERPL-MCNC: 0.8 MG/DL (ref 0.6–1.2)
EOSINOPHILS ABSOLUTE: 0.02 K/UL (ref 0.04–0.54)
EOSINOPHILS RELATIVE PERCENT: 0.2 % (ref 0.7–7)
GFR NON-AFRICAN AMERICAN: >60
GLOBULIN: 2.2 G/DL
GLUCOSE BLD-MCNC: 86 MG/DL (ref 74–106)
HCT VFR BLD CALC: 41.4 % (ref 40.1–51)
HEMOGLOBIN: 13.8 G/DL (ref 13.7–17.5)
LYMPHOCYTES ABSOLUTE: 1.86 K/UL (ref 1.18–3.74)
LYMPHOCYTES RELATIVE PERCENT: 19.5 % (ref 19.3–53.1)
MCH RBC QN AUTO: 33.4 PG (ref 25.7–32.2)
MCHC RBC AUTO-ENTMCNC: 33.3 G/DL (ref 32.3–36.5)
MCV RBC AUTO: 100.2 FL (ref 79–92.2)
MONOCYTES ABSOLUTE: 1.11 K/UL (ref 0.24–0.82)
MONOCYTES RELATIVE PERCENT: 11.7 % (ref 4.7–12.5)
NEUTROPHILS ABSOLUTE: 6.52 K/UL (ref 1.56–6.13)
NEUTROPHILS RELATIVE PERCENT: 68.5 % (ref 34–71.1)
PDW BLD-RTO: 13.7 % (ref 11.6–14.4)
PLATELET # BLD: 144 K/UL (ref 163–337)
PMV BLD AUTO: 10.2 FL (ref 7.4–10.4)
POTASSIUM SERPL-SCNC: 4.1 MMOL/L (ref 3.5–5.1)
RBC # BLD: 4.13 M/UL (ref 4.63–6.08)
SODIUM BLD-SCNC: 141 MMOL/L (ref 137–145)
TOTAL PROTEIN: 6.1 G/DL (ref 6.3–8.2)
WBC # BLD: 9.52 K/UL (ref 4.23–9.07)

## 2020-07-10 PROCEDURE — 80053 COMPREHEN METABOLIC PANEL: CPT

## 2020-07-10 PROCEDURE — 99213 OFFICE O/P EST LOW 20 MIN: CPT | Performed by: NURSE PRACTITIONER

## 2020-07-10 PROCEDURE — 85025 COMPLETE CBC W/AUTO DIFF WBC: CPT

## 2020-07-10 PROCEDURE — 99211 OFF/OP EST MAY X REQ PHY/QHP: CPT

## 2020-07-10 RX ORDER — TAMSULOSIN HYDROCHLORIDE 0.4 MG/1
0.4 CAPSULE ORAL DAILY
COMMUNITY
End: 2020-11-06

## 2020-07-10 RX ORDER — OXYCODONE AND ACETAMINOPHEN 10; 325 MG/1; MG/1
1 TABLET ORAL EVERY 4 HOURS PRN
Qty: 180 TABLET | Refills: 0 | Status: SHIPPED | OUTPATIENT
Start: 2020-07-10 | End: 2020-08-07 | Stop reason: SDUPTHER

## 2020-07-10 ASSESSMENT — ENCOUNTER SYMPTOMS
DIARRHEA: 0
BLOOD IN STOOL: 0
RESPIRATORY NEGATIVE: 1
CONSTIPATION: 0
EYES NEGATIVE: 1
WHEEZING: 0
EYE PAIN: 0
VOMITING: 0
SORE THROAT: 0
EYE REDNESS: 0
ABDOMINAL PAIN: 0
SHORTNESS OF BREATH: 0
COUGH: 0
EYE DISCHARGE: 0

## 2020-07-10 NOTE — PROGRESS NOTES
mutation   · Complex cytogenetics changes      Treatment summary  · 6/12/2019- 7/30/2019 -Chemoradiation for a total of 6000 cGy with Temodar daily. · 8/29/2019- anticipate initiating Temadar 150 mg/m2 days 1-5 on a 28 day regimen     Cancer history  Mr Fidel Gallardo was seen in initial oncology consultation by Dr. Vaishali Garcia on 5/24/2019 referred by Dr. Francois Dillard for diagnosis of primary brain tumor, grade 3 astrocytoma. Landy Ridley was being seen by neurology with complaints of left facial and upper extremity. · 4/2/2019-MRI brain with contrast showed changes in the frontal convexity with a fullness of the sulcal gyral pattern. Specifically, 4.5 x 4.5 x 3.5 cm right frontal lesion. Second, discrete hyperintense nodule measuring 1.1 x 1.9 x 1.5 cm in the subcortical white matter of the paramedian posterior right frontal lobe immediately above the corpus callosum. This was concerning for high-grade glioma. · 4/15/2019-he underwent a craniotomy with stereotactic biopsy by Dr. Lucinda Britton at [facility]. 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 Further molecular analysis at [facility] revealed IDH1/2 wild type, MGMT non-methylated, TERT mutation. Complex cytogenetics changes A maximum safe resection was not possible due to concerns of significant sequela. Second opinion was recommended at the [facility]. · 5/17/2019-he was seen by Dr. Cindy Peres. Recommended concurrent chemoradiation. · 5/24/2019-initial oncology consultation, Dr. Vaishali Garcia recommended concurrent chemoradiation with Temodar. · 6/3/2019. CT scan of the head without contrast documented to ill-defined masslike areas of increased attenuation within the right frontal lobe. Largest measuring 3.3 cm, previously measuring 1.8 cm and second lesion in the lateral aspect of the right frontal lobe measuring up to 1.5 cm. No intracranial hemorrhage or midline shift.    · 6/3/2019- MRI of the brain with and corpus callosum. Past Medical History:    Past Medical History:   Diagnosis Date    Cancer ([facility]     brain    GERD (gastroesophageal reflux disease)     Hyperlipidemia     Hypertension     Seizure ([facility] [address] 07/03/2017       Past Surgical History:    Past Surgical History:   Procedure Laterality Date    COLONOSCOPY      WA SHLDR ARTHROSCOP,SURG,CAPSULORRHAPHY Left 7/2/2018    SHOULDER ARTHROSCOPIC BANKART REPAIR, ANTERIOR, POSTERIOR, AND INFERIOR LABRAL REPAIR performed by Adrian Anderson MD at [facility] [place]       Current Medications:    Current Outpatient Medications   Medication Sig Dispense Refill    tamsulosin (FLOMAX) 0.4 MG capsule Take 0.4 mg by mouth daily      temozolomide (TEMODAR) 100 MG chemo capsule TAKE 3 CAPSULES BY MOUTH DAILY ON DAYS 1 THROUGH 5 OF EACH 28 DAY CYCLE. 15 capsule 4    fluticasone (FLONASE) 50 MCG/ACT nasal spray 2 sprays by Each Nostril route daily 1 Bottle 1    ondansetron (ZOFRAN) 4 MG tablet TAKE 2 TABLETS BY MOUTH EVERY 8 HOURS AS NEEDED FOR NAUSEA OR VOMITING 30 tablet 2    Omega-3 Fatty Acids (FISH OIL) 1000 MG CAPS Fish Oil 340 mg-1,000 mg capsule      aspirin (ASPIR-LOW) 81 MG EC tablet Aspir-Low 81 mg tablet,delayed release   Take 1 tablet every day by oral route.       divalproex (DEPAKOTE) 500 MG DR tablet Take 500 mg by mouth 3 times daily       levETIRAcetam (KEPPRA) 1000 MG tablet Take 1,000 mg by mouth 2 times daily       metoprolol succinate (TOPROL XL) 25 MG extended release tablet Take 25 mg by mouth      promethazine (PHENERGAN) 12.5 MG tablet promethazine 12.5 mg tablet   one po q4h      vitamin B-6 (PYRIDOXINE) 100 MG tablet       venlafaxine (EFFEXOR XR) 150 MG extended release capsule venlafaxine  mg capsule,extended release 24 hr      lactulose 20 GM/30ML SOLN Take by mouth as needed      levETIRAcetam (KEPPRA) 250 MG tablet Take 250 mg by mouth 2 times daily   5    ALPRAZolam (XANAX) 0.25 MG tablet Take 0.25 mg by frequency, hematuria and urgency. Musculoskeletal: Positive for arthralgias, back pain (Chronic), gait problem and myalgias. Negative for neck pain. Skin: Negative. Negative for rash. Neurological: Positive for weakness (Generalized). Negative for dizziness, tremors, seizures and headaches. Hematological: Does not bruise/bleed easily. Psychiatric/Behavioral: Negative. The patient is not nervous/anxious. Objective   PHYSICAL EXAM:  Physical Exam  Vitals signs reviewed. Constitutional:       General: He is not in acute distress. Appearance: He is well-developed. He is not diaphoretic. HENT:      Head: Normocephalic and atraumatic. Mouth/Throat:      Pharynx: Uvula midline. Tonsils: No tonsillar exudate. Eyes:      General: Lids are normal. No scleral icterus. Right eye: No discharge. Left eye: No discharge. Conjunctiva/sclera: Conjunctivae normal.      Pupils: Pupils are equal, round, and reactive to light. Neck:      Musculoskeletal: Normal range of motion and neck supple. Thyroid: No thyroid mass or thyromegaly. Vascular: No JVD. Trachea: Trachea normal. No tracheal deviation. Cardiovascular:      Rate and Rhythm: Normal rate and regular rhythm. Heart sounds: Normal heart sounds. No murmur. No friction rub. No gallop. Pulmonary:      Effort: Pulmonary effort is normal. No respiratory distress. Breath sounds: Normal breath sounds. No wheezing or rales. Chest:      Chest wall: No tenderness. Abdominal:      General: Bowel sounds are normal. There is no distension. Palpations: Abdomen is soft. There is no mass. Tenderness: There is no abdominal tenderness. There is no guarding or rebound. Hernia: No hernia is present. Musculoskeletal:         General: No tenderness or deformity.       Comments: Range of motion within normal limits x4 extremities  Chronic left-sided weakness and bilateral lower extremity weakness, no change from previous exam   Skin:     General: Skin is warm. Coloration: Skin is not pale. Findings: No erythema or rash. Neurological:      Mental Status: He is alert and oriented to person, place, and time. Cranial Nerves: No cranial nerve deficit. Coordination: Coordination normal.   Psychiatric:         Behavior: Behavior normal.         Thought Content: Thought content normal.         Labs: WBC 9.52, ANC 6.52, hemoglobin 13.8, .2 and a platelet count of 495,613. CBC:   No results for input(s): WBC, HGB, PLT in the last 72 hours. CMP: No results for input(s): GLUCOSE, BUN, CREATININE, BCR, CO2, CALCIUM, ALBUMIN, LABIL2, ALKPHOS, AST, ALT in the last 72 hours. Invalid input(s): EGFRIFNONA, EGFRIFAFRI, SODIUM, POTASSIUM, CHLORIDE, PROTENTOTREF, GLOBULIN, BILIRUBIN  Hepatic: No results for input(s): AST, ALT, ALB, BILITOT, ALKPHOS in the last 72 hours. Troponin: No results for input(s): TROPONINI in the last 72 hours. BNP: No results for input(s): BNP in the last 72 hours. Lipids: No results for input(s): CHOL, HDL in the last 72 hours. Invalid input(s): LDL  INR: No results for input(s): INR in the last 72 hours. ABG: No results for input(s): PHART, MQF1DRU, PO2ART, EIX5FPY, S3YWZIEV, BEART in the last 72 hours. 30 Day lookback of cultures:    Blood Culture Recent: No results for input(s): BC in the last 720 hours. Gram Stain Recent: No results for input(s): LABGRAM in the last 720 hours. Resp Culture Recent: No results for input(s): CULTRESP in the last 720 hours. Body Fluid Recent : No results for input(s): BFCX in the last 720 hours. MRSA Recent : No results for input(s): [address] in the last 720 hours. Urine Culture Recent : No results for input(s): LABURIN in the last 720 hours. Organism Recent : No results for input(s): ORG in the last 720 hours. ASSESSMENT/PLAN:      1. Anaplastic glioma, unresectable MGMT un-methylated.   Status post combined modality therapy with radiation and Temodar. Continues to take palliative Temodar 150 mg/m² days 1 through 5 on a 28-day regimen. Follow-up MRI of the brain on 6/4/2020 continue to show positive response to treatment.    -Continue Temodar as prescribed  -Continue to follow with Dr. Alverna Goodell every 3 months as recommended, next appointment in September with MRI of the brain  -CBC and CMP today    2. Fatigue due to treatment and disease process. Fatigue, grade 2 with no significant change from previous evaluation.    -Encouraged increasing activity level as tolerated. 3. Chronic pain of both knees, no change from previous evaluation  -Follow-up with Dr. Michael Muniz  -Is anticipating initiating physical therapy. 4. Requires continuous supervision for activities of daily living (ADL) secondary to disease process and seizure activity.   -Anticipating physical therapy with Dr. Avelina Britton in [place]   -Seizure activity is controlled with anticonvulsants managed by neurology  -Mother request that Dilantin level has been checked    5. 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 Intolerant to long-acting medications, experiences hallucinations and significantly lethargic. Reports pain is overall controlled with current regimen.    -Continue Percocet 10/325, 1 tablet every 4 hours as needed for pain, new prescription provided    6. Side effect monitoring of chemotherapy  -Fatigue grade 2, stable  -Nausea, currently controlled with promethazine and no change from previous evaluations    7. Hypertension BP today 160/90 today. VLADIMIR Avilez is managing his hypertension and he is currently taking metoprolol and losartan. I encouraged close monitoring of his BP and contacting Darnell Grubbs for further recommendations if the elevation continues to be persistent. He denies any chest pain, shortness of breath or vision changes. FOLLOW UP:  1.  Follow-up appointment given for 4 weeks, sooner if needed  2. Continue physical therapy as recommended   3. Continue to follow with other medical providers as recommended    Over 50% of the total visit time of 1 stoppages 5 minutes in face to face encounter with the patient, out of which more than 50% of the time was spent in counseling patient  and coordination of care. Counseling included but was not limited to time spent reviewing labs, imaging studies/ treatment plan and answering questions. This does not include charting. Discussed precautions related to [address] and being at increased risk. Discussed proper handwashing to be done frequently, limit exposure to other individuals and maintain social distancing of 6 feet. Recommend contacting primary care provider if having respiratory symptoms for further recommendations and consideration for testing.     (Please note that portions of this note were completed with a voice recognition program. Efforts were made to edit the dictations but occasionally words are mis-transcribed.)    Electronically signed by VLADIMIR Watters on 7/14/2020 at [address] PM

## 2020-07-14 ASSESSMENT — ENCOUNTER SYMPTOMS
BACK PAIN: 1
NAUSEA: 1

## 2020-07-14 NOTE — PROGRESS NOTES
Subjective    Mr. Savage is 55 y.o. male    Chief Complaint: LUTS    History of Present Illness     Lower Urinary tract symptoms  Patient is here for her lower urinary tract symptoms. The patient is bothered by slow stream, nocturia, urgency, urge incontinence, frequency, but is without hematuria.  Onset has been sudden.  The patient perceives the voided amount is Symptoms have been present for several months.  Severity is described as Moderate.  Course has been worsening. The patient perceives the amount voided is less than expected for the urge. Previous  history includes diagnosis of BPH.  Previous treatment includes alpha blockers  which was too early to assess effectiveness.         The following portions of the patient's history were reviewed and updated as appropriate: allergies, current medications, past family history, past medical history, past social history, past surgical history and problem list.    Review of Systems   Constitutional: Negative for appetite change and fever.   HENT: Negative for hearing loss and sore throat.    Eyes: Negative for pain and redness.   Respiratory: Negative for cough and shortness of breath.    Cardiovascular: Negative for chest pain and leg swelling.   Gastrointestinal: Negative for anal bleeding, nausea and vomiting.   Endocrine: Negative for cold intolerance and heat intolerance.   Genitourinary: Positive for frequency and urgency. Negative for dysuria, flank pain and hematuria.   Musculoskeletal: Negative for joint swelling and myalgias.   Skin: Negative for color change and rash.   Allergic/Immunologic: Negative for immunocompromised state.   Neurological: Negative for dizziness and speech difficulty.   Hematological: Negative for adenopathy. Does not bruise/bleed easily.   Psychiatric/Behavioral: Negative for dysphoric mood and suicidal ideas.         Current Outpatient Medications:   •  allopurinol (ZYLOPRIM) 300 MG tablet, 300 mg As Needed., Disp: , Rfl: 0  •   ALPRAZolam (XANAX) 0.25 MG tablet, TAKE 1 TABLET BY MOUTH 2 (TWO) TIMES A DAY AS NEEDED FOR ANXIETY., Disp: 60 tablet, Rfl: 0  •  aspirin 81 MG tablet, Take 1 tablet by mouth Daily., Disp: , Rfl:   •  cyclobenzaprine (FLEXERIL) 5 MG tablet, Take 1 tablet by mouth 3 (Three) Times a Day As Needed for Muscle Spasms., Disp: 30 tablet, Rfl: 0  •  divalproex (DEPAKOTE) 500 MG DR tablet, TAKE TWO TABLETS BY MOUTH EVERY 8 HOURS., Disp: 180 tablet, Rfl: 5  •  lactulose 20 GM/30ML solution solution, Take 30 mL by mouth Daily. (Patient taking differently: Take 20 g by mouth As Needed.), Disp: 946 mL, Rfl: 1  •  levETIRAcetam (KEPPRA) 1000 MG tablet, TAKE ONE TABLET BY MOUTH TWICE A DAY, Disp: 60 tablet, Rfl: 5  •  levETIRAcetam (KEPPRA) 250 MG tablet, TAKE ONE TABLET BY MOUTH TWICE A DAY, Disp: 60 tablet, Rfl: 5  •  losartan (COZAAR) 100 MG tablet, Take 1 tablet by mouth Daily., Disp: 90 tablet, Rfl: 1  •  metoprolol succinate XL (TOPROL XL) 25 MG 24 hr tablet, Take 1 tablet by mouth Daily., Disp: 30 tablet, Rfl: 6  •  Omega-3 Fatty Acids (FISH OIL) 1000 MG capsule capsule, TAKE TWO CAPSULES BY MOUTH EVERY MORNING WITH BREAKFAST, Disp: 180 capsule, Rfl: 1  •  omeprazole (prilOSEC) 10 MG capsule, TAKE ONE CAPSULE BY MOUTH DAILY FOR ACID REFLUX, Disp: 90 capsule, Rfl: 1  •  ondansetron (ZOFRAN) 4 MG tablet, Take 4 mg by mouth Every 8 (Eight) Hours As Needed., Disp: , Rfl: 2  •  oxyCODONE-acetaminophen (PERCOCET)  MG per tablet, Take 1 tablet by mouth Every 4 (Four) Hours As Needed., Disp: , Rfl:   •  promethazine (PHENERGAN) 12.5 MG tablet, TAKE 1 TABLET BY MOUTH EVERY 4 (FOUR) HOURS AS NEEDED FOR NAUSEA OR VOMITING., Disp: 60 tablet, Rfl: 1  •  tamsulosin (FLOMAX) 0.4 MG capsule 24 hr capsule, Take 1 capsule by mouth Daily., Disp: 30 capsule, Rfl: 3  •  temozolomide (TEMODAR) 100 MG chemo capsule, Takes 3 capsules for 5 days, off 23 days., Disp: , Rfl:   •  venlafaxine XR (EFFEXOR-XR) 150 MG 24 hr capsule, TAKE ONE  CAPSULE BY MOUTH DAILY, Disp: 90 capsule, Rfl: 1  •  VIMPAT 150 MG tablet, TAKE ONE TABLET BY MOUTH TWICE A DAY, Disp: 60 tablet, Rfl: 2  •  vitamin B-6 (PYRIDOXINE) 100 MG tablet, TAKE 1 TABLET BY MOUTH DAILY., Disp: 30 tablet, Rfl: 5    Past Medical History:   Diagnosis Date   • Anemia 6/17/2019   • Angio-edema 7/3/2017   • Anxiety    • Arthritis    • Cancer (CMS/HCC) 05/09/2019    Brain cancer diagnoised yesterday  Dr Trevino    • Clostridium difficile colitis    • Drug-induced hepatic toxicity 9/6/2019   • Erectile dysfunction 10/6/2016   • GERD (gastroesophageal reflux disease)    • Gout    • HCAP (healthcare-associated pneumonia) 7/11/2017   • Hyperlipidemia    • Malignant hypertension    • MSSA (methicillin susceptible Staphylococcus aureus) infection 7/31/2017   • Obstructive sleep apnea    • S/P shoulder surgery 7/27/2018   • Seizures (CMS/HCC) 7/4/2017   • 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 9/6/2019       Past Surgical History:   Procedure Laterality Date   • COLONOSCOPY     • CRANIOTOMY Right 4/15/2019    Procedure: CRANIOTOMY WITH BIOPSY RIGHT;  Surgeon: Dillon Trevino MD;  Location: [facility] OR;  Service: Neurosurgery   • SHOULDER ARTHROSCOPY Left    • TRACHEOSTOMY N/A 7/12/2017    Procedure: TRACHEOSTOMY WITH TRACHEOSCOPY;  Surgeon: Jairon Pierson MD;  Location: [facility] OR;  Service:        Social History     Socioeconomic History   • Marital status: Single     Spouse name: Not on file   • Number of children: 2   • Years of education: Not on file   • Highest education level: Not on file   Occupational History   • Occupation: ELECTRIAL WORK   Tobacco Use   • Smoking status: Former Smoker     Packs/day: 0.33     Years: 30.00     Pack years: 9.90     Types: Cigarettes     Last attempt to quit: 7/3/2017     Years since quitting: 3.0   • Smokeless tobacco: Never Used   Substance and Sexual Activity   • Alcohol use: No     Frequency: Never     Comment: used to drink alot but now just ocasional beer since has  seizure in 2017   • Drug use: No   • Sexual activity: Defer       Family History   Problem Relation Age of Onset   • Hypertension Mother    • Diabetes Mother    • Heart disease Father    • Hypertension Father    • No Known Problems Daughter    • No Known Problems Son    • Diabetes Maternal Grandmother    • No Known Problems Maternal Grandfather    • No Known Problems Paternal Grandmother    • Heart attack Paternal Grandfather    • Kidney disease Sister        Objective    There were no vitals taken for this visit.    Physical Exam   Constitutional: He is oriented to person, place, and time. He appears well-developed and well-nourished. No distress.   HENT:   Nose: Nose normal.   Neck: Normal range of motion. Neck supple. No tracheal deviation present. No thyromegaly present.   Cardiovascular: Normal rate, regular rhythm and intact distal pulses.   No significant edema or tenderness    Pulmonary/Chest: Breath sounds normal. No accessory muscle usage. No respiratory distress.   Abdominal: Soft. Bowel sounds are normal. He exhibits no distension, no ascites and no mass. There is no hepatosplenomegaly. There is no tenderness. There is no rebound, no guarding and no CVA tenderness. No hernia.   Stool specimen is not indicated for my portion of the exam   Genitourinary: Testes normal and penis normal. Rectal exam shows no mass, no tenderness, anal tone normal and guaiac negative stool. Tender: no nodules. Right testis shows no mass, no swelling and no tenderness. Left testis shows no mass, no swelling and no tenderness. No penile tenderness (no lesion or deformities).   Genitourinary Comments:  The urethral meatus normal in position without evidence of stricture. Epididymis without mass or tenderness. Vas Deferens is palpably normal.Anus and perineum without mass or tenderness. The seminal vesicle are without mass or enlargement. The prostate is approximately 20 ml. It is Symmetric, with a Soft consistency. There are no  nodules present. . The seminal vesicles are Not palpable due to the size of the prostate.     Lymphadenopathy:     He has no cervical adenopathy. No inguinal adenopathy noted on the right or left side.        Right: No inguinal adenopathy present.        Left: No inguinal adenopathy present.   Neurological: He is alert and oriented to person, place, and time.   Skin: Skin is warm and dry. No rash noted. He is not diaphoretic. No pallor.   Psychiatric: He has a normal mood and affect. His behavior is normal.   Vitals reviewed.          Results for orders placed or performed during the hospital encounter of 07/08/20   POC Creatinine   Result Value Ref Range    Creatinine 1.00 0.60 - 1.30 mg/dL     Bladder Scan interpretation  Estimation of residual urine via abdominal ultrasound  Residual Urine: 80 ml  Indication: Frequency/incontinence  Position: Supine  Examination: Incremental scanning of the suprapubic area using 3 MHz transducer using copious amounts of acoustic gel.   Findings: An anechoic area was demonstrated which represented the bladder, with measurement of residual urine as noted. I inspected this myself. In that the residual urine was stable or insignificant, no treatment will be necessary at this time.         Assessment and Plan    Diagnoses and all orders for this visit:    Urinary incontinence, unspecified type  -     POC Urinalysis Dipstick, Multipro    Urinary frequency  -     POC Urinalysis Dipstick, Multipro      Patient with history of inoperable brain tumor sp radiation/chemo.  He has irritative voiding symptoms.  He was recently placed on flomax.  Unable to assess its effectiveness.        I am going to have him continue Flomax for a week if his symptoms are improved start anticholinergic.  Follow-up in 6 months.

## 2020-07-14 NOTE — PROGRESS NOTES
Subjective   Lukasz Savage is a 55 y.o. male is here today for follow-up.    No recent seizures, last known seizure was in August 2019.  He follows regularly as directed with neurosurgery.  His most recent MRI was on 4 June 2020.  He saw neurosurgery (Dr. Trevino) on the same day.  He has started Temodar.  Overall, it is reported that his images indicate disease stability.  The patient and his mother report an undulating course of weakness on the left with difficulty with gait and some day-to-day activities.  A suggestion and orders for physical therapy has been made but the patient has not been able to attend because of the COVID restrictions.    The patient reports increasing difficulty with nocturia and frequent nighttime awakenings.  He describes nighttime frequency, urgency, intermittency and has received a referral to urology but this appointment has not been made yet.      Neurologic Problem   The patient's primary symptoms include an altered mental status, focal sensory loss, focal weakness, a loss of balance, memory loss, slurred speech and weakness. This is a chronic problem. The current episode started more than 1 month ago. The neurological problem developed suddenly. The problem has been gradually improving since onset. There was left-sided focality noted. Associated symptoms include fatigue, nausea and vomiting. Pertinent negatives include no confusion. Past treatments include sleep, bed rest, position change and acetaminophen. Improvement on treatment: Stable. (PRES 2017)   Seizures    This is a recurrent problem. The current episode started more than 1 week ago. The problem has not changed since onset.There were more than 10 seizures. The most recent episode lasted 30 to 120 seconds. Associated symptoms include nausea and vomiting. Pertinent negatives include no confusion and no speech difficulty. Characteristics include rhythmic jerking, loss of consciousness and bit tongue. The episode was  witnessed. There was no sensation of an aura present. The seizure(s) had left-sided focality.   Brain Tumor   This is a chronic problem. The current episode started more than 1 month ago. The problem occurs constantly. The problem has been unchanged. Associated symptoms include fatigue, nausea, vomiting and weakness. The symptoms are aggravated by exertion and stress. He has tried rest for the symptoms. Improvement on treatment: Stable.        The following portions of the patient's history were reviewed and updated as appropriate: allergies, current medications, past family history, past medical history, past social history, past surgical history and problem list.    Review of Systems   Constitutional: Positive for fatigue.   HENT: Negative.    Eyes: Positive for photophobia.   Respiratory: Negative.    Cardiovascular: Negative.    Gastrointestinal: Positive for nausea and vomiting.   Endocrine: Negative.    Genitourinary: Positive for difficulty urinating, dysuria, frequency, nocturia and urgency.   Musculoskeletal: Positive for gait problem.   Skin: Negative.    Allergic/Immunologic: Negative.    Neurological: Positive for tremors, focal weakness, loss of consciousness, facial asymmetry, weakness, headache, loss of balance and memory problem. Negative for seizures, speech difficulty and confusion.   Hematological: Negative.    Psychiatric/Behavioral: Positive for agitation, decreased concentration, dysphoric mood, hallucinations, memory loss and sleep disturbance. The patient is nervous/anxious.          Current Outpatient Medications:   •  allopurinol (ZYLOPRIM) 300 MG tablet, 300 mg As Needed., Disp: , Rfl: 0  •  ALPRAZolam (XANAX) 0.25 MG tablet, TAKE 1 TABLET BY MOUTH 2 (TWO) TIMES A DAY AS NEEDED FOR ANXIETY., Disp: 60 tablet, Rfl: 0  •  aspirin 81 MG tablet, Take 1 tablet by mouth Daily., Disp: , Rfl:   •  cyclobenzaprine (FLEXERIL) 5 MG tablet, Take 1 tablet by mouth 3 (Three) Times a Day As Needed for  Muscle Spasms., Disp: 30 tablet, Rfl: 0  •  divalproex (DEPAKOTE) 500 MG DR tablet, TAKE TWO TABLETS BY MOUTH EVERY 8 HOURS., Disp: 180 tablet, Rfl: 5  •  lactulose 20 GM/30ML solution solution, Take 30 mL by mouth Daily. (Patient taking differently: Take 20 g by mouth As Needed.), Disp: 946 mL, Rfl: 1  •  levETIRAcetam (KEPPRA) 1000 MG tablet, TAKE ONE TABLET BY MOUTH TWICE A DAY, Disp: 60 tablet, Rfl: 5  •  levETIRAcetam (KEPPRA) 250 MG tablet, TAKE ONE TABLET BY MOUTH TWICE A DAY, Disp: 60 tablet, Rfl: 5  •  losartan (COZAAR) 100 MG tablet, Take 1 tablet by mouth Daily., Disp: 90 tablet, Rfl: 1  •  metoprolol succinate XL (TOPROL XL) 25 MG 24 hr tablet, Take 1 tablet by mouth Daily., Disp: 30 tablet, Rfl: 6  •  Omega-3 Fatty Acids (FISH OIL) 1000 MG capsule capsule, TAKE TWO CAPSULES BY MOUTH EVERY MORNING WITH BREAKFAST, Disp: 180 capsule, Rfl: 1  •  omeprazole (prilOSEC) 10 MG capsule, TAKE ONE CAPSULE BY MOUTH DAILY FOR ACID REFLUX, Disp: 90 capsule, Rfl: 1  •  ondansetron (ZOFRAN) 4 MG tablet, Take 4 mg by mouth Every 8 (Eight) Hours As Needed., Disp: , Rfl: 2  •  oxyCODONE-acetaminophen (PERCOCET)  MG per tablet, Take 1 tablet by mouth Every 4 (Four) Hours As Needed., Disp: , Rfl:   •  promethazine (PHENERGAN) 12.5 MG tablet, TAKE 1 TABLET BY MOUTH EVERY 4 (FOUR) HOURS AS NEEDED FOR NAUSEA OR VOMITING., Disp: 60 tablet, Rfl: 1  •  temozolomide (TEMODAR) 100 MG chemo capsule, Takes 3 capsules for 5 days, off 23 days., Disp: , Rfl:   •  venlafaxine XR (EFFEXOR-XR) 150 MG 24 hr capsule, TAKE ONE CAPSULE BY MOUTH DAILY, Disp: 90 capsule, Rfl: 1  •  vitamin B-6 (PYRIDOXINE) 100 MG tablet, TAKE 1 TABLET BY MOUTH DAILY., Disp: 30 tablet, Rfl: 5  •  tamsulosin (FLOMAX) 0.4 MG capsule 24 hr capsule, Take 1 capsule by mouth Daily., Disp: 30 capsule, Rfl: 3  •  VIMPAT 150 MG tablet, TAKE ONE TABLET BY MOUTH TWICE A DAY, Disp: 60 tablet, Rfl: 2     Objective   Physical Exam   Constitutional: He is oriented to  person, place, and time. Vital signs are normal.   HENT:   Head: Normocephalic.   Right Ear: Hearing and external ear normal.   Left Ear: Hearing and external ear normal.   Nose: Nose normal.   Mouth/Throat: Uvula is midline, oropharynx is clear and moist and mucous membranes are normal.   Eyes: Pupils are equal, round, and reactive to light. Conjunctivae, EOM and lids are normal. No scleral icterus.   Neck: Trachea normal, normal range of motion and phonation normal. No JVD present. Carotid bruit is not present.   Cardiovascular: Normal rate and normal heart sounds.   Pulmonary/Chest: Effort normal and breath sounds normal.   Neurological: He is alert and oriented to person, place, and time. He displays tremor. He displays no atrophy. No cranial nerve deficit or sensory deficit. He exhibits abnormal muscle tone. Gait abnormal. GCS eye subscore is 4. GCS verbal subscore is 5. GCS motor subscore is 6.   Reflex Scores:       Bicep reflexes are 2+ on the right side and 3+ on the left side.       Brachioradialis reflexes are 2+ on the right side and 3+ on the left side.       Patellar reflexes are 2+ on the right side and 3+ on the left side.  Stable left hemiparesis worse in the upper extremity, worse distal to proximal.   Skin: Skin is warm, dry and intact.   Psychiatric: His speech is normal. Thought content normal. His mood appears anxious. He is slowed. Cognition and memory are normal. He expresses impulsivity.   Nursing note and vitals reviewed.        Assessment/Plan   Lukasz was seen today for seizures.    Diagnoses and all orders for this visit:    Astrocytoma brain tumor (CMS/HCC)    Seizures (CMS/HCC)    Left hemiparesis (CMS/HCC)    Obstructive sleep apnea    History of radiation therapy    Requires continuous supervision for activities of daily living (ADL)    BPH associated with nocturia  -     tamsulosin (FLOMAX) 0.4 MG capsule 24 hr capsule; Take 1 capsule by mouth Daily.    The patient is  neurologically stable with no evident recurrent seizures.  I have discussed seizure management and medications with the patient in detail today and recommended no changes with regard to his seizure management.  I have reviewed expectations regarding his seizures and the likelihood that he will need to continue on antiepileptic medication for the foreseeable future.    Safety instructions and home exercises are reviewed with regard to his left hemiparesis.  The nature of his weakness and expectations regarding rehabilitation are reviewed with the patient and his mother.  I did also emphasize that physical therapy may be very helpful with regard to gait training, adaptive measures or devices.    Management of sleep apnea and the use of BiPAP are encouraged and reviewed with the patient and his mother.  The patient's frequent nighttime awakening and nocturia is likely not just from sleep apnea.  The frequent nighttime arousals are significantly interfering with his use of BiPAP.  Because of his weakness in the left upper extremity, he requires assistance to place the mask and often does not ask for it in the middle of the night.    Safety awareness, reliance on family, use of family members to maintain compliance with medications and BiPAP are reviewed and encouraged for the patient today.    I have recommended a trial of Flomax 0.4 mg once per day.  I will reevaluate the patient in 2 weeks via video visit and in 3 months for regular office visit.  The patient and his mother are encouraged to follow-up with urology when the appointment is scheduled.    The patient and his mother have asked several questions and expressed concerns which are reviewed in detail today.      25 minutes of a 30 minute outpatient visit was spent in counseling and coordination of care today.           Dictated utilizing Dragon dictation.

## 2020-07-15 ENCOUNTER — TELEMEDICINE (OUTPATIENT)
Dept: NEUROLOGY | Facility: CLINIC | Age: 55
End: 2020-07-15

## 2020-07-15 VITALS — HEIGHT: 68 IN | WEIGHT: 198 LBS | BODY MASS INDEX: 30.01 KG/M2

## 2020-07-15 DIAGNOSIS — R56.9 SEIZURES (HCC): ICD-10-CM

## 2020-07-15 DIAGNOSIS — D49.6 BRAIN NEOPLASM (HCC): ICD-10-CM

## 2020-07-15 DIAGNOSIS — F33.1 MODERATE EPISODE OF RECURRENT MAJOR DEPRESSIVE DISORDER (HCC): ICD-10-CM

## 2020-07-15 DIAGNOSIS — G47.33 OBSTRUCTIVE SLEEP APNEA: ICD-10-CM

## 2020-07-15 DIAGNOSIS — C71.9 ASTROCYTOMA BRAIN TUMOR (HCC): Primary | ICD-10-CM

## 2020-07-15 PROCEDURE — 99442 PR PHYS/QHP TELEPHONE EVALUATION 11-20 MIN: CPT | Performed by: PHYSICIAN ASSISTANT

## 2020-07-15 RX ORDER — MULTIVITAMIN WITH IRON
TABLET ORAL
Qty: 30 TABLET | Refills: 5 | OUTPATIENT
Start: 2020-07-15

## 2020-07-16 RX ORDER — VENLAFAXINE HYDROCHLORIDE 150 MG/1
CAPSULE, EXTENDED RELEASE ORAL
Qty: 90 CAPSULE | Refills: 1 | Status: ON HOLD | OUTPATIENT
Start: 2020-07-16 | End: 2021-01-01

## 2020-07-17 ENCOUNTER — OFFICE VISIT (OUTPATIENT)
Dept: UROLOGY | Facility: CLINIC | Age: 55
End: 2020-07-17

## 2020-07-17 DIAGNOSIS — R35.0 URINARY FREQUENCY: ICD-10-CM

## 2020-07-17 DIAGNOSIS — N39.41 URGE INCONTINENCE OF URINE: Primary | ICD-10-CM

## 2020-07-17 LAB
BILIRUB BLD-MCNC: NEGATIVE MG/DL
CLARITY, POC: CLEAR
COLOR UR: YELLOW
GLUCOSE UR STRIP-MCNC: NEGATIVE MG/DL
KETONES UR QL: ABNORMAL
LEUKOCYTE EST, POC: NEGATIVE
NITRITE UR-MCNC: NEGATIVE MG/ML
PH UR: 8.5 [PH] (ref 5–8)
PROT UR STRIP-MCNC: ABNORMAL MG/DL
RBC # UR STRIP: NEGATIVE /UL
SP GR UR: 1.01 (ref 1–1.03)
UROBILINOGEN UR QL: NORMAL

## 2020-07-17 PROCEDURE — 51798 US URINE CAPACITY MEASURE: CPT | Performed by: UROLOGY

## 2020-07-17 PROCEDURE — 99204 OFFICE O/P NEW MOD 45 MIN: CPT | Performed by: UROLOGY

## 2020-07-17 RX ORDER — SOLIFENACIN SUCCINATE 5 MG/1
5 TABLET, FILM COATED ORAL DAILY
Qty: 90 TABLET | Refills: 3 | Status: SHIPPED | OUTPATIENT
Start: 2020-07-17 | End: 2020-08-12

## 2020-07-21 NOTE — TELEPHONE ENCOUNTER
Needs chronic appt   Drysol Counseling:  I discussed with the patient the risks of drysol/aluminum chloride including but not limited to skin rash, itching, irritation, burning.

## 2020-07-22 ENCOUNTER — TELEPHONE (OUTPATIENT)
Dept: UROLOGY | Facility: CLINIC | Age: 55
End: 2020-07-22

## 2020-07-22 NOTE — TELEPHONE ENCOUNTER
Patient called and said the med that were called in yesterday were not covered by his insurance is there anything else that can be called in. His call back number is [phone] thanks.

## 2020-07-23 ENCOUNTER — TELEPHONE (OUTPATIENT)
Dept: UROLOGY | Facility: CLINIC | Age: 55
End: 2020-07-23

## 2020-07-23 NOTE — PROGRESS NOTES
This visit was conducted via telehealth/[employer] (patient portal).     Subjective   Lukasz Savage is a 55 y.o. male follow-up.    No recent seizures, last known seizure was in August 2019.  He follows regularly as directed with neurosurgery.  His most recent MRI was on 4 June 2020.  He saw neurosurgery (Dr. Trevino) on the same day.  He has started Temodar.  Overall, it is reported that his images indicate disease stability.  The patient and his mother report an undulating course of weakness on the left with difficulty with gait and some day-to-day activities.  A suggestion and orders for physical therapy has been made but the patient has not been able to attend because of the COVID restrictions.    The patient reports increasing difficulty with nocturia and frequent nighttime awakenings.  He describes nighttime frequency, urgency, intermittency and has received a referral to urology but this appointment has not been made yet.    Interim history: The patient has a video follow-up today as a follow-up from last visit.  He is subjectively reporting that he has been able to be more compliant with sleep apnea management and has noticed some improvement.  He is not sure if Flomax has been helpful.  He has a urology appointment on Friday.      Neurologic Problem   The patient's primary symptoms include an altered mental status, focal sensory loss, focal weakness, a loss of balance, memory loss, slurred speech and weakness. This is a chronic problem. The current episode started more than 1 month ago. The neurological problem developed suddenly. The problem has been gradually improving since onset. There was left-sided focality noted. Associated symptoms include fatigue, nausea and vomiting. Pertinent negatives include no confusion. Past treatments include sleep, bed rest, position change and acetaminophen. Improvement on treatment: Stable. (PRES 2017)   Seizures    This is a recurrent problem. The current episode  started more than 1 week ago. The problem has not changed since onset.There were more than 10 seizures. The most recent episode lasted 30 to 120 seconds. Associated symptoms include nausea and vomiting. Pertinent negatives include no confusion and no speech difficulty. Characteristics include rhythmic jerking, loss of consciousness and bit tongue. The episode was witnessed. There was no sensation of an aura present. The seizure(s) had left-sided focality.   Brain Tumor   This is a chronic problem. The current episode started more than 1 month ago. The problem occurs constantly. The problem has been unchanged. Associated symptoms include fatigue, nausea, vomiting and weakness. The symptoms are aggravated by exertion and stress. He has tried rest for the symptoms. Improvement on treatment: Stable.        The following portions of the patient's history were reviewed and updated as appropriate: allergies, current medications, past family history, past medical history, past social history, past surgical history and problem list.        Review of Systems   Constitutional: Positive for fatigue.   HENT: Negative.    Eyes: Positive for photophobia.   Respiratory: Negative.    Cardiovascular: Negative.    Gastrointestinal: Positive for nausea and vomiting.   Endocrine: Negative.    Genitourinary: Positive for difficulty urinating, dysuria, frequency, nocturia and urgency.   Musculoskeletal: Positive for gait problem.   Skin: Negative.    Allergic/Immunologic: Negative.    Neurological: Positive for tremors, focal weakness, loss of consciousness, facial asymmetry, weakness, headache, loss of balance and memory problem. Negative for seizures, speech difficulty and confusion.   Hematological: Negative.    Psychiatric/Behavioral: Positive for agitation, decreased concentration, dysphoric mood, hallucinations, memory loss and sleep disturbance. The patient is nervous/anxious.          Current Outpatient Medications:   •   allopurinol (ZYLOPRIM) 300 MG tablet, 300 mg As Needed., Disp: , Rfl: 0  •  ALPRAZolam (XANAX) 0.25 MG tablet, TAKE 1 TABLET BY MOUTH 2 (TWO) TIMES A DAY AS NEEDED FOR ANXIETY., Disp: 60 tablet, Rfl: 0  •  aspirin 81 MG tablet, Take 1 tablet by mouth Daily., Disp: , Rfl:   •  cyclobenzaprine (FLEXERIL) 5 MG tablet, Take 1 tablet by mouth 3 (Three) Times a Day As Needed for Muscle Spasms., Disp: 30 tablet, Rfl: 0  •  divalproex (DEPAKOTE) 500 MG DR tablet, TAKE TWO TABLETS BY MOUTH EVERY 8 HOURS., Disp: 180 tablet, Rfl: 5  •  lactulose 20 GM/30ML solution solution, Take 30 mL by mouth Daily. (Patient taking differently: Take 20 g by mouth As Needed.), Disp: 946 mL, Rfl: 1  •  levETIRAcetam (KEPPRA) 1000 MG tablet, TAKE ONE TABLET BY MOUTH TWICE A DAY, Disp: 60 tablet, Rfl: 5  •  levETIRAcetam (KEPPRA) 250 MG tablet, TAKE ONE TABLET BY MOUTH TWICE A DAY, Disp: 60 tablet, Rfl: 5  •  losartan (COZAAR) 100 MG tablet, Take 1 tablet by mouth Daily., Disp: 90 tablet, Rfl: 1  •  metoprolol succinate XL (TOPROL XL) 25 MG 24 hr tablet, Take 1 tablet by mouth Daily., Disp: 30 tablet, Rfl: 6  •  Omega-3 Fatty Acids (FISH OIL) 1000 MG capsule capsule, TAKE TWO CAPSULES BY MOUTH EVERY MORNING WITH BREAKFAST, Disp: 180 capsule, Rfl: 1  •  omeprazole (prilOSEC) 10 MG capsule, TAKE ONE CAPSULE BY MOUTH DAILY FOR ACID REFLUX, Disp: 90 capsule, Rfl: 1  •  ondansetron (ZOFRAN) 4 MG tablet, Take 4 mg by mouth Every 8 (Eight) Hours As Needed., Disp: , Rfl: 2  •  oxyCODONE-acetaminophen (PERCOCET)  MG per tablet, Take 1 tablet by mouth Every 4 (Four) Hours As Needed., Disp: , Rfl:   •  promethazine (PHENERGAN) 12.5 MG tablet, TAKE 1 TABLET BY MOUTH EVERY 4 (FOUR) HOURS AS NEEDED FOR NAUSEA OR VOMITING., Disp: 60 tablet, Rfl: 1  •  tamsulosin (FLOMAX) 0.4 MG capsule 24 hr capsule, Take 1 capsule by mouth Daily., Disp: 30 capsule, Rfl: 3  •  temozolomide (TEMODAR) 100 MG chemo capsule, Takes 3 capsules for 5 days, off 23 days., Disp: ,  Rfl:   •  VIMPAT 150 MG tablet, TAKE ONE TABLET BY MOUTH TWICE A DAY, Disp: 60 tablet, Rfl: 2  •  vitamin B-6 (PYRIDOXINE) 100 MG tablet, TAKE 1 TABLET BY MOUTH DAILY., Disp: 30 tablet, Rfl: 5  •  solifenacin (VESICARE) 5 MG tablet, Take 1 tablet by mouth Daily., Disp: 90 tablet, Rfl: 3  •  venlafaxine XR (EFFEXOR-XR) 150 MG 24 hr capsule, TAKE ONE CAPSULE BY MOUTH DAILY, Disp: 90 capsule, Rfl: 1     Objective   Physical Exam   Constitutional: He is oriented to person, place, and time.   HENT:   Head: Normocephalic.   Right Ear: Hearing and external ear normal.   Left Ear: Hearing and external ear normal.   Nose: Nose normal.   Eyes: Lids are normal. No scleral icterus.   Neck: Normal range of motion and phonation normal.   Neurological: He is alert and oriented to person, place, and time. GCS eye subscore is 4. GCS verbal subscore is 5. GCS motor subscore is 6.   Stable left hemiparesis worse in the upper extremity, worse distal to proximal.   Psychiatric: His speech is normal. Thought content normal. His mood appears anxious. He is slowed. Cognition and memory are normal. He expresses impulsivity.   Nursing note reviewed.        Assessment/Plan   Lukasz was seen today for seizures.    Diagnoses and all orders for this visit:    Astrocytoma brain tumor (CMS/HCC)    Seizures (CMS/HCC)    Obstructive sleep apnea    The patient is neurologically stable.  He has been able to resume sleep apnea treatment and this appears to be beneficial.  I have encouraged him to continue with this.  I have encouraged him to continue with Flomax until he sees urology (Dr. Boland) later this week.    The patient's questions and concerns are reviewed with the patient and his mother in detail today.  Follow-up appointment is arranged.                 Patient voices understanding and agreement with plan of care. Advised to notify the office of any additional concerns or questions in the interim.     This was a 15 minute video  teleconference.      Dictated utilizing Dragon dictation.

## 2020-07-24 DIAGNOSIS — N39.41 URGE INCONTINENCE OF URINE: Primary | ICD-10-CM

## 2020-07-24 RX ORDER — OXYBUTYNIN CHLORIDE 5 MG/1
5 TABLET ORAL EVERY 24 HOURS
Qty: 90 TABLET | Refills: 3 | Status: SHIPPED | OUTPATIENT
Start: 2020-07-24

## 2020-07-30 DIAGNOSIS — I10 ESSENTIAL HYPERTENSION: ICD-10-CM

## 2020-07-30 RX ORDER — METOPROLOL SUCCINATE 25 MG/1
25 TABLET, EXTENDED RELEASE ORAL DAILY
Qty: 30 TABLET | Refills: 6 | Status: SHIPPED | OUTPATIENT
Start: 2020-07-30 | End: 2020-01-01 | Stop reason: SDUPTHER

## 2020-08-07 ENCOUNTER — HOSPITAL ENCOUNTER (OUTPATIENT)
Dept: INFUSION THERAPY | Age: 55
Discharge: HOME | End: 2020-08-07
Payer: MEDICAID

## 2020-08-07 ENCOUNTER — OFFICE VISIT (OUTPATIENT)
Dept: HEMATOLOGY | Age: 55
End: 2020-08-07
Payer: MEDICAID

## 2020-08-07 VITALS
HEART RATE: 64 BPM | DIASTOLIC BLOOD PRESSURE: 66 MMHG | WEIGHT: 201.4 LBS | HEIGHT: 68 IN | SYSTOLIC BLOOD PRESSURE: 118 MMHG | OXYGEN SATURATION: 97 % | BODY MASS INDEX: 30.52 KG/M2 | TEMPERATURE: 97.3 F

## 2020-08-07 DIAGNOSIS — D64.81 ANEMIA ASSOCIATED WITH CHEMOTHERAPY: ICD-10-CM

## 2020-08-07 DIAGNOSIS — C71.9 ANAPLASTIC GLIOMA OF BRAIN (HCC): ICD-10-CM

## 2020-08-07 DIAGNOSIS — T45.1X5A ANEMIA ASSOCIATED WITH CHEMOTHERAPY: ICD-10-CM

## 2020-08-07 LAB
ALBUMIN SERPL-MCNC: 3.7 G/DL (ref 3.5–5.2)
ALP BLD-CCNC: 49 U/L (ref 40–130)
ALT SERPL-CCNC: 32 U/L (ref 21–72)
ANION GAP SERPL CALCULATED.3IONS-SCNC: 2 MMOL/L (ref 7–19)
AST SERPL-CCNC: 22 U/L (ref 17–59)
BASOPHILS ABSOLUTE: 0.02 K/UL (ref 0.01–0.08)
BASOPHILS RELATIVE PERCENT: 0.3 % (ref 0.1–1.2)
BILIRUB SERPL-MCNC: 0.4 MG/DL (ref 0.2–1.3)
BUN BLDV-MCNC: 20 MG/DL (ref 9–20)
CALCIUM SERPL-MCNC: 9.1 MG/DL (ref 8.4–10.2)
CHLORIDE BLD-SCNC: 105 MMOL/L (ref 98–111)
CO2: 34 MMOL/L (ref 22–29)
CREAT SERPL-MCNC: 0.8 MG/DL (ref 0.6–1.2)
EOSINOPHILS ABSOLUTE: 0.03 K/UL (ref 0.04–0.54)
EOSINOPHILS RELATIVE PERCENT: 0.4 % (ref 0.7–7)
GFR NON-AFRICAN AMERICAN: >60
GLOBULIN: 2 G/DL
GLUCOSE BLD-MCNC: 93 MG/DL (ref 74–106)
HCT VFR BLD CALC: 39.5 % (ref 40.1–51)
HEMOGLOBIN: 13 G/DL (ref 13.7–17.5)
LYMPHOCYTES ABSOLUTE: 1.99 K/UL (ref 1.18–3.74)
LYMPHOCYTES RELATIVE PERCENT: 29.7 % (ref 19.3–53.1)
MCH RBC QN AUTO: 34.1 PG (ref 25.7–32.2)
MCHC RBC AUTO-ENTMCNC: 32.9 G/DL (ref 32.3–36.5)
MCV RBC AUTO: 103.7 FL (ref 79–92.2)
MONOCYTES ABSOLUTE: 0.88 K/UL (ref 0.24–0.82)
MONOCYTES RELATIVE PERCENT: 13.2 % (ref 4.7–12.5)
NEUTROPHILS ABSOLUTE: 3.77 K/UL (ref 1.56–6.13)
NEUTROPHILS RELATIVE PERCENT: 56.4 % (ref 34–71.1)
PDW BLD-RTO: 14.1 % (ref 11.6–14.4)
PLATELET # BLD: 162 K/UL (ref 163–337)
PMV BLD AUTO: 9.7 FL (ref 7.4–10.4)
POTASSIUM SERPL-SCNC: 4.3 MMOL/L (ref 3.5–5.1)
RBC # BLD: 3.81 M/UL (ref 4.63–6.08)
SODIUM BLD-SCNC: 141 MMOL/L (ref 137–145)
TOTAL PROTEIN: 5.7 G/DL (ref 6.3–8.2)
WBC # BLD: 6.69 K/UL (ref 4.23–9.07)

## 2020-08-07 PROCEDURE — 99211 OFF/OP EST MAY X REQ PHY/QHP: CPT

## 2020-08-07 PROCEDURE — 99213 OFFICE O/P EST LOW 20 MIN: CPT | Performed by: NURSE PRACTITIONER

## 2020-08-07 PROCEDURE — 80053 COMPREHEN METABOLIC PANEL: CPT

## 2020-08-07 PROCEDURE — 85025 COMPLETE CBC W/AUTO DIFF WBC: CPT

## 2020-08-07 RX ORDER — OXYCODONE AND ACETAMINOPHEN 10; 325 MG/1; MG/1
1 TABLET ORAL EVERY 4 HOURS PRN
Qty: 180 TABLET | Refills: 0 | Status: SHIPPED | OUTPATIENT
Start: 2020-08-07 | End: 2020-09-06

## 2020-08-07 ASSESSMENT — ENCOUNTER SYMPTOMS
RESPIRATORY NEGATIVE: 1
SHORTNESS OF BREATH: 0
EYES NEGATIVE: 1
WHEEZING: 0
VOMITING: 0
ABDOMINAL PAIN: 0
BLOOD IN STOOL: 0
CONSTIPATION: 0
EYE REDNESS: 0
SORE THROAT: 0
DIARRHEA: 0
COUGH: 0
EYE PAIN: 0
EYE DISCHARGE: 0

## 2020-08-07 NOTE — PROGRESS NOTES
Progress Note      Pt Name: Chasity Vergara  YOB: 1965  MRN: [id number]    Date of evaluation: 8/7/2020  History Obtained From:  patient, electronic medical record    CHIEF COMPLAINT:    Chief Complaint   Patient presents with    Follow-up       Anaplastic glioma of brain       HISTORY OF PRESENT ILLNESS:    Chasity Vergara is a 48 y.o.  male with a significant past medical history of anaplastic glioma, diagnosed April 2019 and placed on palliative Temodar on 8/29/2019. Tal Jalloh has continued to have a positive response to treatment and continues to tolerate treatment well. He is currently taking Temodar 150 mg/m² days 1 through 5 every 28 days and will continue until intolerance of drug or progression of disease. Tal Jalloh is also followed closely by Dr. Reny Katz with every 3-month follow-up MRI of the brain, last MRI on 6/4/2020 continues show no residual enhancement of the lesion above the corpus callosum. He returns today in scheduled follow-up for evaluation, side effect monitoring, lab monitoring and consideration to continue with current treatment. Tal Jalloh is accompanied by his mother again today, she is his primary caregiver. Tal Jalloh presents today with complaints of increased weakness in his left lower extremity. He reports falling several times over the past few days, denies any injuries. He reports he is not been able to go to physical therapy for the past 3 weeks due to multiple reasons and reports he will resume therapy on 8/10/2020. He indicates that he is walking daily up and down the driveway and uses a cane for assistance. He needs to have headaches although there is no change from previous evaluation. Tal Jalloh is scheduled for repeat MRI of the brain and to see Dr. Reny Katz in September. CBC today is within normal limits.     At his last office visit on 7/10/2020, Mr. Rena Redd voiced concern related about no one checking his valproic acid level for Eduard. Recommended concurrent chemoradiation. · 5/24/2019-initial oncology consultation, Dr. Anahy Ramsay recommended concurrent chemoradiation with Temodar. · 6/3/2019. CT scan of the head without contrast documented to ill-defined masslike areas of increased attenuation within the right frontal lobe. Largest measuring 3.3 cm, previously measuring 1.8 cm and second lesion in the lateral aspect of the right frontal lobe measuring up to 1.5 cm. No intracranial hemorrhage or midline shift. · 6/3/2019- MRI of the brain with and without contrast, compared to 4/2/2019 documented masslike appearance at the right frontal lobe measuring 2.9 x 1.4 cm, previously measuring 1.7 x 1.1 cm with mass effect and possible extension into the corpus callosum. No midline shift. · 6/14/2019-CT scan cervical spine without contrast documented no evidence of acute bony injury. Multilevel disc degeneration spondylosis. · 6/12/2019- Initiated Temodar along with radiation therapy   · 6/17/2019- MRI of the brain with and without contrast documented 3 cm enhancing, partially necrotic tumor in the both the right corpus callosum body, consistent with known astrocytoma. Additional FLAIR signal on both sides of the right central sulcus with faint contrast enhancement in the precentral gyrus, compatible with tumor extension. No hemorrhage or brain herniation. · 6/21/2019 - CT scan of the head without contrast hypodense focus of the right frontal lobe measuring 3.3 cm. No intracranial hemorrhage. No abnormal extra-axial fluid collection. Right frontal craniotomy changes. · 6/12/2019- 7/30/2019 -Chemoradiation for a total of 6000 cGy with Temodar daily. · 8/27/2019- MRI of the brain with and without contrast at [facility] revealed interval decrease in size and mass effect of the solid enhancing nodule now measuring 7 mm compared to 2 cm, suggesting a positive response to treatment.  Interval increasing in FLAIR abnormality in the lateral ventricles is suspected to be induced by early radiation changes. No new enhancing nodule in this area  · 3/3/2020 - MRI of the brain with and without contrast documented a slightly decreased abnormal flair signal in the right frontoparietal region. Decrease in the enhancement with only subtle residual enhancement noted with no new regions of abnormal enhancement. · 6/4/2020-MRI of the brain with and without contrast documented residual gliosis in the right parietal white matter. No residual enhancement of the lesion above the right corpus callosum. Past Medical History:    Past Medical History:   Diagnosis Date    Cancer ([facility] [address]     brain    GERD (gastroesophageal reflux disease)     Hyperlipidemia     Hypertension     Seizure ([facility] [address] 07/03/2017       Past Surgical History:    Past Surgical History:   Procedure Laterality Date    COLONOSCOPY      MT SHLDR ARTHROSCOP,SURG,CAPSULORRHAPHY Left 7/2/2018    SHOULDER ARTHROSCOPIC BANKART REPAIR, ANTERIOR, POSTERIOR, AND INFERIOR LABRAL REPAIR performed by Azam Mooney MD at [facility]       Current Medications:    Current Outpatient Medications   Medication Sig Dispense Refill    oxyCODONE-acetaminophen (PERCOCET)  MG per tablet Take 1 tablet by mouth every 4 hours as needed for Pain for up to 30 days. Intended supply: 30 days 180 tablet 0    tamsulosin (FLOMAX) 0.4 MG capsule Take 0.4 mg by mouth daily      temozolomide (TEMODAR) 100 MG chemo capsule TAKE 3 CAPSULES BY MOUTH DAILY ON DAYS 1 THROUGH 5 OF EACH 28 DAY CYCLE.  15 capsule 4    fluticasone (FLONASE) 50 MCG/ACT nasal spray 2 sprays by Each Nostril route daily 1 Bottle 1    ondansetron (ZOFRAN) 4 MG tablet TAKE 2 TABLETS BY MOUTH EVERY 8 HOURS AS NEEDED FOR NAUSEA OR VOMITING 30 tablet 2    Omega-3 Fatty Acids (FISH OIL) 1000 MG CAPS Fish Oil 340 mg-1,000 mg capsule      aspirin (ASPIR-LOW) 81 MG EC tablet Aspir-Low 81 mg tablet,delayed release   Take 1 tablet every day by oral route.  divalproex (DEPAKOTE) 500 MG DR tablet Take 500 mg by mouth 3 times daily       levETIRAcetam (KEPPRA) 1000 MG tablet Take 1,000 mg by mouth 2 times daily       metoprolol succinate (TOPROL XL) 25 MG extended release tablet Take 25 mg by mouth      promethazine (PHENERGAN) 12.5 MG tablet promethazine 12.5 mg tablet   one po q4h      vitamin B-6 (PYRIDOXINE) 100 MG tablet       venlafaxine (EFFEXOR XR) 150 MG extended release capsule venlafaxine  mg capsule,extended release 24 hr      lactulose 20 GM/30ML SOLN Take by mouth as needed      levETIRAcetam (KEPPRA) 250 MG tablet Take 250 mg by mouth 2 times daily   5    ALPRAZolam (XANAX) 0.25 MG tablet Take 0.25 mg by mouth 2 times daily as needed for Sleep.  lacosamide (VIMPAT) 50 MG TABS tablet Take 150 mg by mouth every 12 hours.  omeprazole (PRILOSEC) 10 MG delayed release capsule Take 10 mg by mouth daily      losartan (COZAAR) 100 MG tablet Take 50 mg by mouth daily        No current facility-administered medications for this visit. Allergies: Allergies   Allergen Reactions    Pregabalin Other (See Comments)    Lisinopril Swelling     BRAIN SWELLING/COMA    Lipitor [Atorvastatin] Rash       Social History:    Social History     Tobacco Use    Smoking status: Former Smoker     Packs/day: 0.50     Years: 20.00     Pack years: 10.00     Types: Cigarettes     Last attempt to quit: 2015     Years since quittin.1    Smokeless tobacco: Current User   Substance Use Topics    Alcohol use: Yes     Comment: OCC    Drug use: No       Family History:   No family history on file. Vitals:  Vitals:    20 1152   BP: 118/66   Pulse: 64   Temp: 97.3 °F (36.3 °C)   SpO2: 97%   Weight: 201 lb 6.4 oz (91.4 kg)   Height: 5' 8\" (1.727 m)        Subjective   REVIEW OF SYSTEMS:   Review of Systems   Constitutional: Positive for fatigue. Negative for chills, diaphoresis and fever. HENT: Negative. Negative for congestion, ear pain, hearing loss, nosebleeds, sore throat and tinnitus. Eyes: Negative. Negative for pain, discharge and redness. Respiratory: Negative. Negative for cough, shortness of breath and wheezing. Cardiovascular: Negative. Negative for chest pain, palpitations and leg swelling. Gastrointestinal: Positive for nausea (While taking Temodar). Negative for abdominal pain, blood in stool, constipation, diarrhea and vomiting. Endocrine: Negative for polydipsia. Genitourinary: Negative for dysuria, flank pain, frequency, hematuria and urgency. Musculoskeletal: Positive for arthralgias and back pain. Negative for myalgias and neck pain. Skin: Negative. Negative for rash. Neurological: Positive for weakness (Generalized and increased weakness in the left lower extremity). Negative for dizziness, tremors, seizures and headaches. Hematological: Does not bruise/bleed easily. Psychiatric/Behavioral: Negative. The patient is not nervous/anxious. Objective   PHYSICAL EXAM:  Physical Exam  Vitals signs reviewed. Constitutional:       General: He is not in acute distress. Appearance: He is well-developed. He is not diaphoretic. HENT:      Head: Normocephalic and atraumatic. Mouth/Throat:      Pharynx: Uvula midline. Tonsils: No tonsillar exudate. Eyes:      General: Lids are normal. No scleral icterus. Right eye: No discharge. Left eye: No discharge. Conjunctiva/sclera: Conjunctivae normal.      Pupils: Pupils are equal, round, and reactive to light. Neck:      Musculoskeletal: Normal range of motion and neck supple. Thyroid: No thyroid mass or thyromegaly. Vascular: No JVD. Trachea: Trachea normal. No tracheal deviation. Cardiovascular:      Rate and Rhythm: Normal rate and regular rhythm. Heart sounds: Normal heart sounds. No murmur. No friction rub. No gallop.     Pulmonary:      Effort: Pulmonary effort is normal. No respiratory distress. Breath sounds: Normal breath sounds. No wheezing or rales. Chest:      Chest wall: No tenderness. Abdominal:      General: Bowel sounds are normal. There is no distension. Palpations: Abdomen is soft. There is no mass. Tenderness: There is no abdominal tenderness. There is no guarding or rebound. Hernia: No hernia is present. Musculoskeletal:         General: No tenderness or deformity. Comments: Range of motion within normal limits x4 extremities  Generalized weakness  Left lower extremity with increased weakness causing unsteady gait   Skin:     General: Skin is warm. Coloration: Skin is not pale. Findings: No erythema or rash. Neurological:      Mental Status: He is alert and oriented to person, place, and time. Cranial Nerves: No cranial nerve deficit. Coordination: Coordination normal.   Psychiatric:         Behavior: Behavior normal.         Thought Content: Thought content normal.         Labs: WBC 6.69, ANC 3.77, hemoglobin 13, .7 and a platelet count of [phone]. CBC:   No results for input(s): WBC, HGB, PLT in the last 72 hours. CMP: No results for input(s): GLUCOSE, BUN, CREATININE, BCR, CO2, CALCIUM, ALBUMIN, LABIL2, ALKPHOS, AST, ALT in the last 72 hours. Invalid input(s): EGFRIFNONA, EGFRIFAFRI, SODIUM, POTASSIUM, CHLORIDE, PROTENTOTREF, GLOBULIN, BILIRUBIN  Hepatic: No results for input(s): AST, ALT, ALB, BILITOT, ALKPHOS in the last 72 hours. Troponin: No results for input(s): TROPONINI in the last 72 hours. BNP: No results for input(s): BNP in the last 72 hours. Lipids: No results for input(s): CHOL, HDL in the last 72 hours. Invalid input(s): LDL  INR: No results for input(s): INR in the last 72 hours. ABG: No results for input(s): PHART, XWX7MIX, PO2ART, TNP7MVB, F8ZHERBL, BEART in the last 72 hours.     30 Day lookback of cultures:    Blood Culture Recent: No results for input(s): BC in the last overall controlled with current regimen.     -Continue Percocet 10/325 mg 1 tablet every 4 hours as needed for pain. New prescription provided. 6.  Side effect monitoring of chemotherapy  -Fatigue grade 2 stable with no change from previous evaluation  -Nausea while taking Temodar, continue Zofran      7. Hypertension /66. Currently taking metoprolol and losartan under the direction of VLADIMIR Dunbar. FOLLOW UP:  1. Follow-up appointment given for 4 weeks, sooner if needed  2. Resume physical therapy on 8/10/2020 recommended  3. Continue to follow with other medical providers as recommended    Over 50% of the total visit time of 15 minutes in face to face encounter with the patient, out of which more than 50% of the time was spent in counseling patient  and coordination of care. Counseling included but was not limited to time spent reviewing labs, imaging studies/ treatment plan and answering questions. This does not include charting. Discussed precautions related to [address] and being at increased risk. Discussed proper handwashing to be done frequently, limit exposure to other individuals and maintain social distancing of 6 feet. Recommend contacting primary care provider if having respiratory symptoms for further recommendations and consideration for testing.     (Please note that portions of this note were completed with a voice recognition program. Efforts were made to edit the dictations but occasionally words are mis-transcribed.)    Electronically signed by VLADIMIR Alonzo on 8/11/2020 at 8:12 AM

## 2020-08-11 ASSESSMENT — ENCOUNTER SYMPTOMS
NAUSEA: 1
BACK PAIN: 1

## 2020-08-12 ENCOUNTER — LAB (OUTPATIENT)
Dept: LAB | Facility: HOSPITAL | Age: 55
End: 2020-08-12

## 2020-08-12 ENCOUNTER — OFFICE VISIT (OUTPATIENT)
Dept: NEUROLOGY | Facility: CLINIC | Age: 55
End: 2020-08-12

## 2020-08-12 ENCOUNTER — HOSPITAL ENCOUNTER (OUTPATIENT)
Dept: CT IMAGING | Facility: HOSPITAL | Age: 55
Discharge: HOME | End: 2020-08-12
Admitting: PHYSICIAN ASSISTANT

## 2020-08-12 VITALS
OXYGEN SATURATION: 99 % | HEART RATE: 58 BPM | BODY MASS INDEX: 30.01 KG/M2 | WEIGHT: 198 LBS | HEIGHT: 68 IN | DIASTOLIC BLOOD PRESSURE: 78 MMHG | SYSTOLIC BLOOD PRESSURE: 122 MMHG

## 2020-08-12 DIAGNOSIS — G81.94 LEFT HEMIPARESIS (HCC): ICD-10-CM

## 2020-08-12 DIAGNOSIS — C71.9 ASTROCYTOMA BRAIN TUMOR (HCC): ICD-10-CM

## 2020-08-12 DIAGNOSIS — C71.9 ASTROCYTOMA BRAIN TUMOR (HCC): Primary | ICD-10-CM

## 2020-08-12 DIAGNOSIS — D49.6 BRAIN NEOPLASM (HCC): ICD-10-CM

## 2020-08-12 DIAGNOSIS — R56.9 SEIZURES (HCC): ICD-10-CM

## 2020-08-12 LAB
ALBUMIN SERPL-MCNC: 3.8 G/DL (ref 3.5–5)
ALBUMIN/GLOB SERPL: 1.4 G/DL (ref 1.1–2.5)
ALP SERPL-CCNC: 44 U/L (ref 24–120)
ALT SERPL W P-5'-P-CCNC: 33 U/L (ref 0–50)
ANION GAP SERPL CALCULATED.3IONS-SCNC: 4 MMOL/L (ref 4–13)
AST SERPL-CCNC: 33 U/L (ref 7–45)
AUTO MIXED CELLS #: 0.7 10*3/MM3 (ref 0.1–2.6)
AUTO MIXED CELLS %: 11.2 % (ref 0.1–24)
BILIRUB SERPL-MCNC: 0.5 MG/DL (ref 0.1–1)
BUN SERPL-MCNC: 26 MG/DL (ref 5–21)
BUN/CREAT SERPL: 24.8
CALCIUM SPEC-SCNC: 9 MG/DL (ref 8.4–10.4)
CHLORIDE SERPL-SCNC: 105 MMOL/L (ref 98–110)
CO2 SERPL-SCNC: 30 MMOL/L (ref 24–31)
CREAT SERPL-MCNC: 1.05 MG/DL (ref 0.5–1.4)
ERYTHROCYTE [DISTWIDTH] IN BLOOD BY AUTOMATED COUNT: 14.4 % (ref 12.3–15.4)
GFR SERPL CREATININE-BSD FRML MDRD: 74 ML/MIN/1.73
GLOBULIN UR ELPH-MCNC: 2.8 GM/DL
GLUCOSE SERPL-MCNC: 82 MG/DL (ref 70–100)
HCT VFR BLD AUTO: 37.5 % (ref 37.5–51)
HGB BLD-MCNC: 12.8 G/DL (ref 13–17.7)
LYMPHOCYTES # BLD AUTO: 1.5 10*3/MM3 (ref 0.7–3.1)
LYMPHOCYTES NFR BLD AUTO: 23.6 % (ref 19.6–45.3)
MCH RBC QN AUTO: 32.7 PG (ref 26.6–33)
MCHC RBC AUTO-ENTMCNC: 34.1 G/DL (ref 31.5–35.7)
MCV RBC AUTO: 95.7 FL (ref 79–97)
NEUTROPHILS NFR BLD AUTO: 4.3 10*3/MM3 (ref 1.7–7)
NEUTROPHILS NFR BLD AUTO: 65.2 % (ref 42.7–76)
PLATELET # BLD AUTO: 172 10*3/MM3 (ref 140–450)
PMV BLD AUTO: 8.6 FL (ref 6–12)
POTASSIUM SERPL-SCNC: 3.8 MMOL/L (ref 3.5–5.3)
PROT SERPL-MCNC: 6.6 G/DL (ref 6.3–8.7)
RBC # BLD AUTO: 3.92 10*6/MM3 (ref 4.14–5.8)
SODIUM SERPL-SCNC: 139 MMOL/L (ref 135–145)
WBC # BLD AUTO: 6.5 10*3/MM3 (ref 3.4–10.8)

## 2020-08-12 PROCEDURE — 70450 CT HEAD/BRAIN W/O DYE: CPT

## 2020-08-12 PROCEDURE — 36415 COLL VENOUS BLD VENIPUNCTURE: CPT | Performed by: PHYSICIAN ASSISTANT

## 2020-08-12 PROCEDURE — 99214 OFFICE O/P EST MOD 30 MIN: CPT | Performed by: PHYSICIAN ASSISTANT

## 2020-08-12 PROCEDURE — 80177 DRUG SCRN QUAN LEVETIRACETAM: CPT | Performed by: PHYSICIAN ASSISTANT

## 2020-08-12 PROCEDURE — 85025 COMPLETE CBC W/AUTO DIFF WBC: CPT | Performed by: PHYSICIAN ASSISTANT

## 2020-08-12 PROCEDURE — 80235 DRUG ASSAY LACOSAMIDE: CPT | Performed by: PHYSICIAN ASSISTANT

## 2020-08-12 PROCEDURE — 80053 COMPREHEN METABOLIC PANEL: CPT | Performed by: PHYSICIAN ASSISTANT

## 2020-08-12 RX ORDER — MULTIVITAMIN WITH IRON
TABLET ORAL
Qty: 30 TABLET | Refills: 5 | Status: ON HOLD | OUTPATIENT
Start: 2020-08-12 | End: 2021-01-01

## 2020-08-12 NOTE — PROGRESS NOTES
Subjective   Lukasz Savage is a 55 y.o. male is here today for follow-up.    The patient presents with 2 weeks of progressive symptoms of worsening somnolence, increased left-sided weakness, several falls.  Patient has been more compliant with his sleep apnea management subjectively.  This is supported by his mother.  The patient did see urology and has had some medication adjustments there.    There has been no clear typical seizure activity for him.  He has been compliant with his medications.  His symptoms increase does correspond with his chemotherapy schedule.  He is scheduled for a follow-up MRI of the brain in September as well as follow-up with Dr. Trevino of neurosurgery at that time.      Seizures    This is a recurrent problem. The current episode started more than 1 week ago. The problem has not changed since onset.There were more than 10 seizures. The most recent episode lasted 30 to 120 seconds. Associated symptoms include nausea and vomiting. Pertinent negatives include no confusion and no speech difficulty. Characteristics include rhythmic jerking, loss of consciousness and bit tongue. The episode was witnessed. There was no sensation of an aura present. The seizure(s) had left-sided focality.   Neurologic Problem   The patient's primary symptoms include an altered mental status, focal sensory loss, focal weakness, a loss of balance, memory loss, slurred speech and weakness. This is a chronic problem. The current episode started more than 1 month ago. The neurological problem developed suddenly. The problem has been gradually improving since onset. There was left-sided focality noted. Associated symptoms include fatigue, nausea and vomiting. Pertinent negatives include no confusion. Past treatments include sleep, bed rest, position change and acetaminophen. Improvement on treatment: Stable. (PRES 2017)   Brain Tumor   This is a chronic problem. The current episode started more than 1 month ago.  The problem occurs constantly. The problem has been unchanged. Associated symptoms include fatigue, nausea, vomiting and weakness. The symptoms are aggravated by exertion and stress. He has tried rest for the symptoms. Improvement on treatment: Stable.        The following portions of the patient's history were reviewed and updated as appropriate: allergies, current medications, past family history, past medical history, past social history, past surgical history and problem list.    Review of Systems   Constitutional: Positive for fatigue.   HENT: Negative.    Eyes: Positive for photophobia.   Respiratory: Negative.    Cardiovascular: Negative.    Gastrointestinal: Positive for nausea and vomiting.   Endocrine: Negative.    Genitourinary: Positive for difficulty urinating, dysuria, frequency, nocturia and urgency.   Musculoskeletal: Positive for gait problem.   Skin: Negative.    Allergic/Immunologic: Negative.    Neurological: Positive for tremors, focal weakness, loss of consciousness, facial asymmetry, weakness, headache, loss of balance and memory problem. Negative for seizures, speech difficulty and confusion.   Hematological: Negative.    Psychiatric/Behavioral: Positive for agitation, decreased concentration, dysphoric mood, hallucinations, memory loss and sleep disturbance. The patient is nervous/anxious.          Current Outpatient Medications:   •  allopurinol (ZYLOPRIM) 300 MG tablet, 300 mg As Needed., Disp: , Rfl: 0  •  ALPRAZolam (XANAX) 0.25 MG tablet, TAKE 1 TABLET BY MOUTH 2 (TWO) TIMES A DAY AS NEEDED FOR ANXIETY., Disp: 60 tablet, Rfl: 0  •  aspirin 81 MG tablet, Take 1 tablet by mouth Daily., Disp: , Rfl:   •  cyclobenzaprine (FLEXERIL) 5 MG tablet, Take 1 tablet by mouth 3 (Three) Times a Day As Needed for Muscle Spasms., Disp: 30 tablet, Rfl: 0  •  divalproex (DEPAKOTE) 500 MG DR tablet, TAKE TWO TABLETS BY MOUTH EVERY 8 HOURS., Disp: 180 tablet, Rfl: 5  •  lactulose 20 GM/30ML solution  solution, Take 30 mL by mouth Daily. (Patient taking differently: Take 20 g by mouth As Needed.), Disp: 946 mL, Rfl: 1  •  levETIRAcetam (KEPPRA) 1000 MG tablet, TAKE ONE TABLET BY MOUTH TWICE A DAY, Disp: 60 tablet, Rfl: 5  •  levETIRAcetam (KEPPRA) 250 MG tablet, TAKE ONE TABLET BY MOUTH TWICE A DAY, Disp: 60 tablet, Rfl: 5  •  losartan (COZAAR) 100 MG tablet, Take 1 tablet by mouth Daily., Disp: 90 tablet, Rfl: 1  •  metoprolol succinate XL (TOPROL-XL) 25 MG 24 hr tablet, TAKE 1 TABLET BY MOUTH DAILY., Disp: 30 tablet, Rfl: 6  •  Omega-3 Fatty Acids (FISH OIL) 1000 MG capsule capsule, TAKE TWO CAPSULES BY MOUTH EVERY MORNING WITH BREAKFAST, Disp: 180 capsule, Rfl: 1  •  omeprazole (prilOSEC) 10 MG capsule, TAKE ONE CAPSULE BY MOUTH DAILY FOR ACID REFLUX, Disp: 90 capsule, Rfl: 1  •  ondansetron (ZOFRAN) 4 MG tablet, Take 4 mg by mouth Every 8 (Eight) Hours As Needed., Disp: , Rfl: 2  •  oxybutynin (DITROPAN) 5 MG tablet, Take 1 tablet by mouth Daily., Disp: 90 tablet, Rfl: 3  •  oxyCODONE-acetaminophen (PERCOCET)  MG per tablet, Take 1 tablet by mouth Every 4 (Four) Hours As Needed., Disp: , Rfl:   •  promethazine (PHENERGAN) 12.5 MG tablet, TAKE 1 TABLET BY MOUTH EVERY 4 (FOUR) HOURS AS NEEDED FOR NAUSEA OR VOMITING., Disp: 60 tablet, Rfl: 1  •  temozolomide (TEMODAR) 100 MG chemo capsule, Takes 3 capsules for 5 days, off 23 days., Disp: , Rfl:   •  venlafaxine XR (EFFEXOR-XR) 150 MG 24 hr capsule, TAKE ONE CAPSULE BY MOUTH DAILY, Disp: 90 capsule, Rfl: 1  •  VIMPAT 150 MG tablet, TAKE ONE TABLET BY MOUTH TWICE A DAY, Disp: 60 tablet, Rfl: 2  •  vitamin B-6 (PYRIDOXINE) 100 MG tablet, TAKE ONE TABLET BY MOUTH DAILY, Disp: 30 tablet, Rfl: 5     Objective   Physical Exam   Constitutional: He is oriented to person, place, and time. Vital signs are normal.   HENT:   Head: Normocephalic.   Right Ear: Hearing and external ear normal.   Left Ear: Hearing and external ear normal.   Nose: Nose normal.    Mouth/Throat: Uvula is midline, oropharynx is clear and moist and mucous membranes are normal.   Eyes: Pupils are equal, round, and reactive to light. Conjunctivae, EOM and lids are normal. No scleral icterus.   Neck: Trachea normal, normal range of motion and phonation normal. No JVD present. Carotid bruit is not present.   Cardiovascular: Normal rate and normal heart sounds.   Pulmonary/Chest: Effort normal and breath sounds normal.   Neurological: He is alert and oriented to person, place, and time. He displays tremor. He displays no atrophy. No cranial nerve deficit or sensory deficit. He exhibits abnormal muscle tone. Gait abnormal. GCS eye subscore is 4. GCS verbal subscore is 5. GCS motor subscore is 6.   Reflex Scores:       Bicep reflexes are 2+ on the right side and 3+ on the left side.       Brachioradialis reflexes are 2+ on the right side and 3+ on the left side.       Patellar reflexes are 2+ on the right side and 3+ on the left side.  Stable left hemiparesis worse in the upper extremity, worse distal to proximal.   Skin: Skin is warm, dry and intact.   Psychiatric: His speech is normal. Thought content normal. His mood appears anxious. He is slowed. Cognition and memory are normal. He expresses impulsivity.   Nursing note and vitals reviewed.        Assessment/Plan   Lukasz was seen today for seizures.    Diagnoses and all orders for this visit:    Astrocytoma brain tumor (CMS/HCC)  -     CBC & Differential  -     Comprehensive Metabolic Panel  -     Levetiracetam Level (Keppra)  -     Lacosamide Blood  -     CT Head Without Contrast; Future  -     CBC Auto Differential    Left hemiparesis (CMS/HCC)  -     CBC & Differential  -     Comprehensive Metabolic Panel  -     Levetiracetam Level (Keppra)  -     Lacosamide Blood  -     CT Head Without Contrast; Future  -     CBC Auto Differential    Seizures (CMS/HCC)  -     CBC & Differential  -     Comprehensive Metabolic Panel  -     Levetiracetam Level  (Keppra)  -     Lacosamide Blood  -     CT Head Without Contrast; Future  -     CBC Auto Differential    Patient and his mother are reporting some recent decline in status.  Patient tends to have weakness and increased symptoms when undergoing chemo, this is presently the case.  However, the patient is experiencing increased somnolence, more frequent falls, increased left-sided weakness and has had one fall that resulted in a blow to his head which also damaged a wall.  I have recommended that lab work including Keppra and Vimpat levels be obtained today.  I also feel that he meets the threshold for looking at a CT scan of the brain today.  The studies will be obtained immediately after this visit.  I did consider whether these falls might represent seizure.  Given history does not really support seizure, he is on adequate doses of 2 antiepileptics.    The patient is encouraged to continue follow-up with oncology and neurosurgery as directed.  We will contact the patient regarding the results of testing done today.      25 minutes of a 30 minute outpatient visit was spent in counseling and coordination of care today.           Dictated utilizing Dragon dictation.

## 2020-08-15 LAB
LACOSAMIDE: 7.5 UG/ML (ref 5–10)
LEVETIRACETAM SERPL-MCNC: 41.4 UG/ML (ref 10–40)

## 2020-08-19 DIAGNOSIS — I10 ESSENTIAL HYPERTENSION: ICD-10-CM

## 2020-08-19 RX ORDER — LOSARTAN POTASSIUM 100 MG/1
100 TABLET ORAL DAILY
Qty: 90 TABLET | Refills: 1 | OUTPATIENT
Start: 2020-08-19

## 2020-09-01 DIAGNOSIS — E78.2 MIXED HYPERLIPIDEMIA: ICD-10-CM

## 2020-09-01 RX ORDER — CHLORAL HYDRATE 500 MG
CAPSULE ORAL
Qty: 180 CAPSULE | Refills: 1 | Status: ON HOLD | OUTPATIENT
Start: 2020-09-01 | End: 2021-01-01

## 2020-09-08 ENCOUNTER — HOSPITAL ENCOUNTER (OUTPATIENT)
Dept: MRI IMAGING | Facility: HOSPITAL | Age: 55
Discharge: HOME | End: 2020-09-08
Admitting: NEUROLOGICAL SURGERY

## 2020-09-08 ENCOUNTER — OFFICE VISIT (OUTPATIENT)
Dept: NEUROSURGERY | Facility: CLINIC | Age: 55
End: 2020-09-08

## 2020-09-08 VITALS — HEIGHT: 68 IN | WEIGHT: 198 LBS | BODY MASS INDEX: 30.01 KG/M2

## 2020-09-08 DIAGNOSIS — C71.9 ASTROCYTOMA BRAIN TUMOR (HCC): ICD-10-CM

## 2020-09-08 DIAGNOSIS — Z87.891 FORMER SMOKER: ICD-10-CM

## 2020-09-08 DIAGNOSIS — R56.9 SEIZURES (HCC): ICD-10-CM

## 2020-09-08 DIAGNOSIS — C71.9 ASTROCYTOMA BRAIN TUMOR (HCC): Primary | ICD-10-CM

## 2020-09-08 LAB — CREAT BLDA-MCNC: 1 MG/DL (ref 0.6–1.3)

## 2020-09-08 PROCEDURE — 99213 OFFICE O/P EST LOW 20 MIN: CPT | Performed by: NEUROLOGICAL SURGERY

## 2020-09-08 PROCEDURE — 82565 ASSAY OF CREATININE: CPT

## 2020-09-08 PROCEDURE — 0 GADOBENATE DIMEGLUMINE 529 MG/ML SOLUTION: Performed by: NEUROLOGICAL SURGERY

## 2020-09-08 PROCEDURE — 70553 MRI BRAIN STEM W/O & W/DYE: CPT

## 2020-09-08 PROCEDURE — A9577 INJ MULTIHANCE: HCPCS | Performed by: NEUROLOGICAL SURGERY

## 2020-09-08 RX ORDER — TAMSULOSIN HYDROCHLORIDE 0.4 MG/1
CAPSULE ORAL
COMMUNITY
Start: 2020-08-31 | End: 2020-09-25

## 2020-09-08 RX ADMIN — GADOBENATE DIMEGLUMINE 17 ML: 529 INJECTION, SOLUTION INTRAVENOUS at 10:30

## 2020-09-08 NOTE — PROGRESS NOTES
"SUBJECTIVE:  Patient ID: Lukasz Savage is a 55 y.o. male is here today for follow-up.    Chief Complaint: Anaplastic astrocytoma  Chief Complaint   Patient presents with   • Brain Tumor     patient is here for a 3 month follow up with MRI today @ [facility]; of note, patient is very nauseated today and states \"my chemo is really kicking in\".       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  He has had IMRT and Temodar.  He has had several issues with difficult to control seizures and is followed by our neurology department.  He is at the end of his Temodar cycle today which typically is associated with nausea and vomiting.  Again he has been trying to participate in physical therapy for gait and balance training.  That is been a little bit of inconsistent program for a variety of reasons.  He still has difficulty with walking and falls.  He has weakness in the left leg and severe right knee pain which she is followed by an orthopedic surgeon for.  He has had injections in his right knee.  The following portions of the patient's history were reviewed and updated as appropriate: allergies, current medications, past family history, past medical history, past social history, past surgical history and problem list.    OBJECTIVE:    Review of Systems   Neurological: Positive for seizures and weakness.   All other systems reviewed and are negative.         Physical Exam   Constitutional: He is oriented to person, place, and time.   Eyes: Pupils are equal, round, and reactive to light. EOM are normal.   Neurological: He is oriented to person, place, and time. He has a normal Finger-Nose-Finger Test.   Reflex Scores:       Patellar reflexes are 3+ on the right side and 3+ on the left side.  Psychiatric: His speech is normal.       Neurologic Exam     Mental Status   Oriented to person, place, and " time.   Attention: normal.   Speech: speech is normal   Level of consciousness: alert  Knowledge: good.     Cranial Nerves     CN II   Visual fields full to confrontation.     CN III, IV, VI   Pupils are equal, round, and reactive to light.  Extraocular motions are normal.     CN V   Facial sensation intact.     CN VII   Facial expression full, symmetric.     CN VIII   CN VIII normal.     CN IX, X   CN IX normal.   CN X normal.     CN XI   CN XI normal.     CN XII   CN XII normal.     Motor Exam   Muscle bulk: normal  Overall muscle tone: normal  Right arm pronator drift: absent  Left arm pronator drift: absent    Strength   Strength 5/5 except as noted. Left upper extremity weakness 3-4 out of 5.  Distal greater than proximal.  Difficulty with rapid alternating movements in the left hand.      Lower extremity weakness 3-4 out of 5, proximal greater than distal.     Sensory Exam   Light touch normal.   Pinprick normal.     Gait, Coordination, and Reflexes     Gait  Gait: (Mildly unsteady gait.  Has trouble bringing the left leg forward likely due to ataxia and weakness combined.)    Coordination   Finger to nose coordination: normal    Tremor   Resting tremor: absent  Intention tremor: absent  Action tremor: absent    Reflexes   Reflexes 2+ except as noted.   Right patellar: 3+  Left patellar: 3+      Independent Review of Radiographic Studies:                             9/8/2020                                                            3/3/2020      ASSESSMENT/PLAN:  The MRI of the brain with and without contrast does not show any new enhancement areas.  There is some increased flair change since March in the left white matter compared to the right.  I am not convinced this is indicative tumor progression.  Clinically his exam is about the same as it was 3 months ago.  We will see him in follow-up in about 3 months with repeat imaging.      1. Astrocytoma brain tumor (CMS/HCC)    2. Seizures (CMS/HCC)    3.  Former smoker            No follow-ups on file.      Dillon Trevino MD

## 2020-09-09 ENCOUNTER — HOSPITAL ENCOUNTER (OUTPATIENT)
Dept: CT IMAGING | Facility: HOSPITAL | Age: 55
Discharge: HOME | End: 2020-09-09
Admitting: NURSE PRACTITIONER

## 2020-09-09 ENCOUNTER — OFFICE VISIT (OUTPATIENT)
Dept: HEMATOLOGY | Age: 55
End: 2020-09-09
Payer: MEDICAID

## 2020-09-09 ENCOUNTER — TRANSCRIBE ORDERS (OUTPATIENT)
Dept: ADMINISTRATIVE | Facility: HOSPITAL | Age: 55
End: 2020-09-09

## 2020-09-09 ENCOUNTER — HOSPITAL ENCOUNTER (OUTPATIENT)
Dept: INFUSION THERAPY | Age: 55
Discharge: HOME | End: 2020-09-09
Payer: MEDICAID

## 2020-09-09 ENCOUNTER — TELEPHONE (OUTPATIENT)
Dept: FAMILY MEDICINE CLINIC | Facility: CLINIC | Age: 55
End: 2020-09-09

## 2020-09-09 VITALS
HEART RATE: 81 BPM | DIASTOLIC BLOOD PRESSURE: 88 MMHG | SYSTOLIC BLOOD PRESSURE: 146 MMHG | OXYGEN SATURATION: 97 % | TEMPERATURE: 97.7 F | HEIGHT: 68 IN | BODY MASS INDEX: 29.98 KG/M2 | WEIGHT: 197.8 LBS

## 2020-09-09 DIAGNOSIS — D64.81 ANEMIA ASSOCIATED WITH CHEMOTHERAPY: ICD-10-CM

## 2020-09-09 DIAGNOSIS — C71.9 ANAPLASTIC GLIOMA OF BRAIN (HCC): Primary | ICD-10-CM

## 2020-09-09 DIAGNOSIS — C71.9 ANAPLASTIC GLIOMA OF BRAIN (HCC): ICD-10-CM

## 2020-09-09 DIAGNOSIS — T45.1X5A ANEMIA ASSOCIATED WITH CHEMOTHERAPY: ICD-10-CM

## 2020-09-09 LAB
ALBUMIN SERPL-MCNC: 3.6 G/DL (ref 3.5–5.2)
ALP BLD-CCNC: 49 U/L (ref 40–130)
ALT SERPL-CCNC: 25 U/L (ref 21–72)
ANION GAP SERPL CALCULATED.3IONS-SCNC: 6 MMOL/L (ref 7–19)
AST SERPL-CCNC: 26 U/L (ref 17–59)
BASOPHILS ABSOLUTE: 0.03 K/UL (ref 0.01–0.08)
BASOPHILS RELATIVE PERCENT: 0.4 % (ref 0.1–1.2)
BILIRUB SERPL-MCNC: 0.5 MG/DL (ref 0.2–1.3)
BUN BLDV-MCNC: 19 MG/DL (ref 9–20)
CALCIUM SERPL-MCNC: 8.9 MG/DL (ref 8.4–10.2)
CHLORIDE BLD-SCNC: 106 MMOL/L (ref 98–111)
CO2: 30 MMOL/L (ref 22–29)
CREAT SERPL-MCNC: 0.9 MG/DL (ref 0.6–1.2)
EOSINOPHILS ABSOLUTE: 0.08 K/UL (ref 0.04–0.54)
EOSINOPHILS RELATIVE PERCENT: 1.1 % (ref 0.7–7)
GFR NON-AFRICAN AMERICAN: >60
GLOBULIN: 2.1 G/DL
GLUCOSE BLD-MCNC: 93 MG/DL (ref 74–106)
HCT VFR BLD CALC: 41 % (ref 40.1–51)
HEMOGLOBIN: 13.6 G/DL (ref 13.7–17.5)
LYMPHOCYTES ABSOLUTE: 1.71 K/UL (ref 1.18–3.74)
LYMPHOCYTES RELATIVE PERCENT: 24.2 % (ref 19.3–53.1)
MCH RBC QN AUTO: 33.7 PG (ref 25.7–32.2)
MCHC RBC AUTO-ENTMCNC: 33.2 G/DL (ref 32.3–36.5)
MCV RBC AUTO: 101.5 FL (ref 79–92.2)
MONOCYTES ABSOLUTE: 0.86 K/UL (ref 0.24–0.82)
MONOCYTES RELATIVE PERCENT: 12.1 % (ref 4.7–12.5)
NEUTROPHILS ABSOLUTE: 4.4 K/UL (ref 1.56–6.13)
NEUTROPHILS RELATIVE PERCENT: 62.2 % (ref 34–71.1)
PDW BLD-RTO: 13.4 % (ref 11.6–14.4)
PLATELET # BLD: 171 K/UL (ref 163–337)
PMV BLD AUTO: 9.6 FL (ref 7.4–10.4)
POTASSIUM SERPL-SCNC: 4.4 MMOL/L (ref 3.5–5.1)
RBC # BLD: 4.04 M/UL (ref 4.63–6.08)
SODIUM BLD-SCNC: 142 MMOL/L (ref 137–145)
TOTAL PROTEIN: 5.8 G/DL (ref 6.3–8.2)
WBC # BLD: 7.08 K/UL (ref 4.23–9.07)

## 2020-09-09 PROCEDURE — 85025 COMPLETE CBC W/AUTO DIFF WBC: CPT

## 2020-09-09 PROCEDURE — 80053 COMPREHEN METABOLIC PANEL: CPT

## 2020-09-09 PROCEDURE — 70450 CT HEAD/BRAIN W/O DYE: CPT

## 2020-09-09 PROCEDURE — 99212 OFFICE O/P EST SF 10 MIN: CPT

## 2020-09-09 PROCEDURE — 99213 OFFICE O/P EST LOW 20 MIN: CPT | Performed by: NURSE PRACTITIONER

## 2020-09-09 RX ORDER — OXYBUTYNIN CHLORIDE 5 MG/1
5 TABLET ORAL EVERY 24 HOURS
COMMUNITY
Start: 2020-07-24

## 2020-09-09 RX ORDER — OXYCODONE AND ACETAMINOPHEN 10; 325 MG/1; MG/1
1 TABLET ORAL EVERY 4 HOURS PRN
COMMUNITY
Start: 2020-05-08 | End: 2020-09-09 | Stop reason: SDUPTHER

## 2020-09-09 RX ORDER — SOLIFENACIN SUCCINATE 5 MG/1
TABLET, FILM COATED ORAL
COMMUNITY
Start: 2020-07-17

## 2020-09-09 RX ORDER — DICLOFENAC SODIUM 75 MG/1
TABLET, DELAYED RELEASE ORAL
COMMUNITY
End: 2020-09-09 | Stop reason: ALTCHOICE

## 2020-09-09 RX ORDER — OXYCODONE AND ACETAMINOPHEN 10; 325 MG/1; MG/1
1 TABLET ORAL EVERY 4 HOURS PRN
Qty: 180 TABLET | Refills: 0 | Status: CANCELLED | OUTPATIENT
Start: 2020-09-09 | End: 2020-10-09

## 2020-09-09 RX ORDER — OXYCODONE AND ACETAMINOPHEN 10; 325 MG/1; MG/1
1 TABLET ORAL EVERY 4 HOURS PRN
Qty: 180 TABLET | Refills: 0 | Status: SHIPPED | OUTPATIENT
Start: 2020-09-09 | End: 2020-10-09 | Stop reason: SDUPTHER

## 2020-09-09 ASSESSMENT — ENCOUNTER SYMPTOMS
EYE DISCHARGE: 0
SHORTNESS OF BREATH: 0
VOMITING: 0
EYE PAIN: 0
SORE THROAT: 0
DIARRHEA: 0
CONSTIPATION: 0
WHEEZING: 0
RESPIRATORY NEGATIVE: 1
EYE REDNESS: 0
COUGH: 0
BLOOD IN STOOL: 0
ABDOMINAL PAIN: 0
EYES NEGATIVE: 1
BACK PAIN: 1

## 2020-09-09 NOTE — TELEPHONE ENCOUNTER
Patient pharmacy called and is requesting a refill on patients Metoprolol Succinate XL 25mg patient record shows that he is taking it once daily, and the mother has been giving it twice daily I have reviewed the last ov note and I do not see a mention of this. Please review and advise the current dose that you are wanting the patient to be taking.

## 2020-09-09 NOTE — PROGRESS NOTES
further evaluation. Raúl's mother reported that his blood pressure was elevated prior to his fall at 174/110 and after his fall it was 155/121. BP is 146/88, encouraged her to follow-up with VLADIMIR Vernon for blood pressure recommendations. He is currently taking losartan and metoprolol. CBC today is within normal limits. 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 · 8/29/2019- anticipate initiating Temadar 150 mg/m2 days 1-5 on a 28 day regimen     Cancer history  Mr Essence Brewster was seen in initial oncology consultation by Dr. Suzanne Ray on 5/24/2019 referred by Dr. Ciro Bautista for diagnosis of primary brain tumor, grade 3 astrocytoma. Grady Rodrigez was being seen by neurology with complaints of left facial and upper extremity. · 4/2/2019-MRI brain with contrast showed changes in the frontal convexity with a fullness of the sulcal gyral pattern. Specifically, 4.5 x 4.5 x 3.5 cm right frontal lesion. Second, discrete hyperintense nodule measuring 1.1 x 1.9 x 1.5 cm in the subcortical white matter of the paramedian posterior right frontal lobe immediately above the corpus callosum. This was concerning for high-grade glioma. · 4/15/2019-he underwent a craniotomy with stereotactic biopsy by Dr. Raman Daniels at [facility]. 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 Further molecular analysis at [facility] revealed IDH1/2 wild type, MGMT non-methylated, TERT mutation. Complex cytogenetics changes A maximum safe resection was not possible due to concerns of significant sequela. Second opinion was recommended at the [facility]. · 5/17/2019-he was seen by Dr. Yonatan Gabriel. Recommended concurrent chemoradiation. · 5/24/2019-initial oncology consultation, Dr. Anthony Morales recommended concurrent chemoradiation with Temodar. · 6/3/2019. CT scan of the head without contrast documented to ill-defined masslike areas of increased attenuation within the right frontal lobe. Largest measuring 3.3 cm, previously measuring 1.8 cm and second lesion in the lateral aspect of the right frontal lobe measuring up to 1.5 cm. No intracranial hemorrhage or midline shift. · 6/3/2019- MRI of the brain with and without contrast, compared to 4/2/2019 documented masslike appearance at the right frontal lobe measuring 2.9 x 1.4 cm, previously measuring 1.7 x 1.1 cm with mass effect and possible extension into the corpus callosum. No midline shift. · 6/14/2019-CT scan cervical spine without contrast documented no evidence of acute bony injury. Multilevel disc degeneration spondylosis. · 6/12/2019- Initiated Temodar along with radiation therapy   · 6/17/2019- MRI of the brain with and without contrast documented 3 cm enhancing, partially necrotic tumor in the both the right corpus callosum body, consistent with known astrocytoma. Additional FLAIR signal on both sides of the right central sulcus with faint contrast enhancement in the precentral gyrus, compatible with tumor extension. No hemorrhage or brain herniation. · 6/21/2019 - CT scan of the head without contrast hypodense focus of the right frontal lobe measuring 3.3 cm. No intracranial hemorrhage. No abnormal extra-axial fluid collection. Right frontal craniotomy changes. · 6/12/2019- 7/30/2019 -Chemoradiation for a total of 6000 cGy with Temodar daily. · 8/27/2019- MRI of the brain with and without contrast at [facility] revealed interval decrease in size and mass effect of the solid enhancing nodule now measuring 7 mm compared to 2 cm, suggesting a positive response to treatment.  Interval increasing in FLAIR abnormality in the lateral ventricles is suspected to be induced by early radiation changes. No new enhancing nodule in this area  · 3/3/2020 - MRI of the brain with and without contrast documented a slightly decreased abnormal flair signal in the right frontoparietal region. Decrease in the enhancement with only subtle residual enhancement noted with no new regions of abnormal enhancement. · 6/4/2020-MRI of the brain with and without contrast documented residual gliosis in the right parietal white matter. No residual enhancement of the lesion above the right corpus callosum. · 9/8/2020-MRI of the brain with and without contrast documented stable exam without evidence of recurrent disease. Past Medical History:    Past Medical History:   Diagnosis Date    Cancer ([facility] [address]     brain    GERD (gastroesophageal reflux disease)     Hyperlipidemia     Hypertension     Seizure ([facility] [address] 07/03/2017       Past Surgical History:    Past Surgical History:   Procedure Laterality Date    COLONOSCOPY      [place] SHLDR ARTHROSCOP,SURG,CAPSULORRHAPHY Left 7/2/2018    SHOULDER ARTHROSCOPIC BANKART REPAIR, ANTERIOR, POSTERIOR, AND INFERIOR LABRAL REPAIR performed by Brii Khanna MD at [facility]       Current Medications:    Current Outpatient Medications   Medication Sig Dispense Refill    oxybutynin (DITROPAN) 5 MG tablet Take 5 mg by mouth every 24 hours      solifenacin (VESICARE) 5 MG tablet       tamsulosin (FLOMAX) 0.4 MG capsule Take 0.4 mg by mouth daily      temozolomide (TEMODAR) 100 MG chemo capsule TAKE 3 CAPSULES BY MOUTH DAILY ON DAYS 1 THROUGH 5 OF EACH 28 DAY CYCLE.  15 capsule 4    fluticasone (FLONASE) 50 MCG/ACT nasal spray 2 sprays by Each Nostril route daily 1 Bottle 1    ondansetron (ZOFRAN) 4 MG tablet TAKE 2 TABLETS BY MOUTH EVERY 8 HOURS AS NEEDED FOR NAUSEA OR VOMITING 30 tablet 2    Omega-3 Fatty Acids (FISH OIL) 1000 MG CAPS Fish Oil 340 mg-1,000 mg capsule      aspirin (ASPIR-LOW) 81 MG EC tablet Aspir-Low 81 mg tablet,delayed release   Take 1 Constitutional: Positive for fatigue. Negative for chills, diaphoresis and fever. HENT: Negative. Negative for congestion, ear pain, hearing loss, nosebleeds, sore throat and tinnitus. Eyes: Negative. Negative for pain, discharge and redness. Respiratory: Negative. Negative for cough, shortness of breath and wheezing. Cardiovascular: Negative. Negative for chest pain, palpitations and leg swelling. Gastrointestinal: Negative. Negative for abdominal pain, blood in stool, constipation, diarrhea, nausea and vomiting. Endocrine: Negative for polydipsia. Genitourinary: Negative for dysuria, flank pain, frequency, hematuria and urgency. Musculoskeletal: Positive for arthralgias, back pain and gait problem. Negative for myalgias and neck pain. Skin: Negative. Negative for rash. Neurological: Positive for weakness and headaches. Negative for dizziness, tremors and seizures. Hematological: Does not bruise/bleed easily. Psychiatric/Behavioral: Negative. The patient is not nervous/anxious. Objective   PHYSICAL EXAM:  Physical Exam  Vitals signs reviewed. Constitutional:       General: He is not in acute distress. Appearance: He is well-developed. He is not diaphoretic. HENT:      Head: Normocephalic and atraumatic. Mouth/Throat:      Pharynx: Uvula midline. Tonsils: No tonsillar exudate. Eyes:      General: Lids are normal. No scleral icterus. Right eye: No discharge. Left eye: No discharge. Conjunctiva/sclera: Conjunctivae normal.      Pupils: Pupils are equal, round, and reactive to light. Neck:      Musculoskeletal: Normal range of motion and neck supple. Thyroid: No thyroid mass or thyromegaly. Vascular: No JVD. Trachea: Trachea normal. No tracheal deviation. Cardiovascular:      Rate and Rhythm: Normal rate and regular rhythm. Heart sounds: Normal heart sounds. No murmur. No friction rub. No gallop.     Pulmonary: Effort: Pulmonary effort is normal. No respiratory distress. Breath sounds: Normal breath sounds. No wheezing or rales. Chest:      Chest wall: No tenderness. Abdominal:      General: Bowel sounds are normal. There is no distension. Palpations: Abdomen is soft. There is no mass. Tenderness: There is no abdominal tenderness. There is no guarding or rebound. Hernia: No hernia is present. Musculoskeletal:         General: No tenderness or deformity. Comments: Range of motion within normal limits x4 extremities  LLE chronic weakness  Requiring a wheelchair today for transportation   Skin:     General: Skin is warm. Coloration: Skin is not pale. Findings: No erythema or rash. Neurological:      Mental Status: He is alert and oriented to person, place, and time. Cranial Nerves: No cranial nerve deficit. Motor: Weakness present. Coordination: Coordination normal.      Gait: Gait abnormal.   Psychiatric:         Speech: Speech normal.         Behavior: Behavior normal.         Thought Content: Thought content normal.      Comments: Somewhat lethargic after fall, arouses easily and oriented times         Labs: WBC 7.08, ANC 4.40, hemoglobin 13.6, .5 and a platelet count of 317,490  CBC:   No results for input(s): WBC, HGB, PLT in the last 72 hours. CMP: No results for input(s): GLUCOSE, BUN, CREATININE, BCR, CO2, CALCIUM, ALBUMIN, LABIL2, ALKPHOS, AST, ALT in the last 72 hours. Invalid input(s): EGFRIFNONA, EGFRIFAFRI, SODIUM, POTASSIUM, CHLORIDE, PROTENTOTREF, GLOBULIN, BILIRUBIN  Hepatic: No results for input(s): AST, ALT, ALB, BILITOT, ALKPHOS in the last 72 hours. Troponin: No results for input(s): TROPONINI in the last 72 hours. BNP: No results for input(s): BNP in the last 72 hours. Lipids: No results for input(s): CHOL, HDL in the last 72 hours. Invalid input(s): LDL  INR: No results for input(s): INR in the last 72 hours.   ABG: No results for input(s): PHART, [id number], PO2ART, CQD4GXW, R1EPLPOB, BEART in the last 72 hours. 30 Day lookback of cultures:    Blood Culture Recent: No results for input(s): BC in the last 720 hours. Gram Stain Recent: No results for input(s): LABGRAM in the last 720 hours. Resp Culture Recent: No results for input(s): CULTRESP in the last 720 hours. Body Fluid Recent : No results for input(s): BFCX in the last 720 hours. MRSA Recent : No results for input(s): [address] in the last 720 hours. Urine Culture Recent : No results for input(s): LABURIN in the last 720 hours. Organism Recent : No results for input(s): ORG in the last 720 hours. ASSESSMENT/PLAN:      1. Anaplastic glioma, unresectable MGMT un-methylated. Status post combined modality therapy with radiation and Temodar. Continues to take palliative Temodar 150 mg/m² days 1 through 5 every 28 days. Has overall been tolerating treatment fairly well with the exception of nausea. MRI of the brain on 9/8/2020 continues show positive response to treatment. Completed day 5 yesterday, 9/8/2020.    -Continue Temodar, next cycle to begin on 10/10/2020  -Anticipate MRI of the brain and follow-up appointment with Dr. Tony Norwood in 3 months  -CBC and CMP today    2. Fatigue due to treatment and disease process. Continues to have grade 2 fatigue. He has had some difficulty continuing with physical therapy due to multiple reasons.   -Encouraged increasing activity as tolerated     3. Chronic pain of both knees, no change from previous evaluations  -Follow-up with Dr. Sumner Him  -Resume physical therapy as tolerated  -Continue to follow with orthopedic clinic    4. Requires continuous supervision for activities of daily living (ADL) secondary to disease process and seizure activity.   -Seizure activity continues to be controlled with anticonvulsants managed by neurology     5. 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   Unfortunately intolerant to long-acting medications, experiences hallucinations and significant lethargy. Reports pain is overall controlled as long as he is taking Percocet every 4 hours.    -Continue Percocet 10/325 mg 1 tablet every 4 hours as needed for pain. New prescription provided. 6.  Side effect monitoring of chemotherapy  -Fatigue grade 2, slightly worsened from previous evaluation  -Nausea with Temodar, continue Zofran as needed    7. Hypertension -Raúl's mother reported that his blood pressure was elevated prior to his fall this morning, 174/110 and after his fall it was 155/121. BP is 146/88, encouraged her to follow-up with VLADIMIR Márquez for blood pressure recommendations. He is currently taking losartan and metoprolol. 8.  Change in level of consciousness after a recent fall with protruding bump on posterior head and increased lethargy. Amanuel Heckler this morning and has been lethargic at times since the fall. He is oriented x4 although closes eyes and appears to be nodding off during office visit. He denies any vision changes or headaches.   -Schedule a stat CT scan of the head for further evaluation. FOLLOW UP:  1. Follow-up appointment given for 4 weeks with   2. Resume physical therapy as soon as appropriate  3. Continue to follow with other medical providers as recommended    Over 50% of the total visit time of 15 minutes in face to face encounter with the patient, out of which more than 50% of the time was spent in counseling patient  and coordination of care. Counseling included but was not limited to time spent reviewing labs, imaging studies/ treatment plan and answering questions. This does not include charting. Discussed precautions related to [address] and being at increased risk. Discussed proper handwashing to be done frequently, limit exposure to other individuals and maintain social distancing of 6 feet.   Recommend contacting primary care provider if having respiratory symptoms for further recommendations and consideration for testing.     (Please note that portions of this note were completed with a voice recognition program. Efforts were made to edit the dictations but occasionally words are mis-transcribed.)    Electronically signed by VLADIMIR Salazar on 9/16/2020 at 12:57 PM

## 2020-09-11 NOTE — TELEPHONE ENCOUNTER
I have spoke with patient mother and advised her of Dr Vargas instructions. Patient mother will bring the patient in for his apt 09/15/2020 they have been advised to bring all med bottle.

## 2020-09-15 ENCOUNTER — TELEPHONE (OUTPATIENT)
Dept: FAMILY MEDICINE CLINIC | Facility: CLINIC | Age: 55
End: 2020-09-15

## 2020-09-15 NOTE — TELEPHONE ENCOUNTER
PATIENT MOTHER CALLED STATING SHE WOULD LIKE A CALL BACK BECAUSE WAS WONDERING IF SHE COULD COME IN FOR HER SONS VISIT AS SHE IS HAVING A VERY DIFFICULT TIME GETTING HIM TO COME INTO THE OFFICE FOR HIS VISIT.    PLEASE CALL AND ADVISE  [phone]

## 2020-09-16 DIAGNOSIS — K21.9 GASTROESOPHAGEAL REFLUX DISEASE WITHOUT ESOPHAGITIS: ICD-10-CM

## 2020-09-16 DIAGNOSIS — R56.9 SEIZURES (HCC): ICD-10-CM

## 2020-09-16 PROBLEM — R41.89: Status: ACTIVE | Noted: 2020-09-16

## 2020-09-16 RX ORDER — DIVALPROEX SODIUM 500 MG/1
TABLET, DELAYED RELEASE ORAL
Qty: 180 TABLET | Refills: 5 | Status: ON HOLD | OUTPATIENT
Start: 2020-09-16 | End: 2021-01-01

## 2020-09-16 RX ORDER — OMEPRAZOLE 10 MG/1
CAPSULE, DELAYED RELEASE ORAL
Qty: 90 CAPSULE | Refills: 1 | Status: SHIPPED | OUTPATIENT
Start: 2020-09-16 | End: 2020-09-21 | Stop reason: CLARIF

## 2020-09-16 ASSESSMENT — ENCOUNTER SYMPTOMS
GASTROINTESTINAL NEGATIVE: 1
NAUSEA: 0

## 2020-09-18 ENCOUNTER — TELEPHONE (OUTPATIENT)
Dept: FAMILY MEDICINE CLINIC | Facility: CLINIC | Age: 55
End: 2020-09-18

## 2020-09-18 NOTE — TELEPHONE ENCOUNTER
Patient insurance will no longer cover the Omeprazole I have submitted the PA request and it has returned unfavorable. Please review and send a different medication to the pharmacy for the patient.

## 2020-09-21 RX ORDER — PANTOPRAZOLE SODIUM 40 MG/1
40 TABLET, DELAYED RELEASE ORAL DAILY
Qty: 90 TABLET | Refills: 0 | Status: SHIPPED | OUTPATIENT
Start: 2020-09-21 | End: 2020-01-01

## 2020-09-25 DIAGNOSIS — R56.9 SEIZURES (HCC): ICD-10-CM

## 2020-09-25 DIAGNOSIS — C71.9 ASTROCYTOMA BRAIN TUMOR (HCC): ICD-10-CM

## 2020-09-25 RX ORDER — TAMSULOSIN HYDROCHLORIDE 0.4 MG/1
CAPSULE ORAL
Qty: 30 CAPSULE | Refills: 0 | Status: SHIPPED | OUTPATIENT
Start: 2020-09-25 | End: 2020-01-01

## 2020-09-29 RX ORDER — LACOSAMIDE 150 MG/1
TABLET, FILM COATED ORAL
Qty: 60 TABLET | Refills: 2 | OUTPATIENT
Start: 2020-09-29 | End: 2020-01-01

## 2020-10-09 ENCOUNTER — OFFICE VISIT (OUTPATIENT)
Dept: HEMATOLOGY | Age: 55
End: 2020-10-09
Payer: MEDICAID

## 2020-10-09 ENCOUNTER — HOSPITAL ENCOUNTER (OUTPATIENT)
Dept: INFUSION THERAPY | Age: 55
Discharge: HOME | End: 2020-10-09
Payer: MEDICAID

## 2020-10-09 VITALS
WEIGHT: 199.4 LBS | SYSTOLIC BLOOD PRESSURE: 136 MMHG | HEART RATE: 74 BPM | TEMPERATURE: 97.8 F | HEIGHT: 68 IN | BODY MASS INDEX: 30.22 KG/M2 | DIASTOLIC BLOOD PRESSURE: 72 MMHG | OXYGEN SATURATION: 96 %

## 2020-10-09 DIAGNOSIS — D64.81 ANEMIA ASSOCIATED WITH CHEMOTHERAPY: ICD-10-CM

## 2020-10-09 DIAGNOSIS — T45.1X5A ANEMIA ASSOCIATED WITH CHEMOTHERAPY: ICD-10-CM

## 2020-10-09 DIAGNOSIS — C71.9 ANAPLASTIC GLIOMA OF BRAIN (HCC): ICD-10-CM

## 2020-10-09 LAB
ALBUMIN SERPL-MCNC: 3.7 G/DL (ref 3.5–5.2)
ALP BLD-CCNC: 53 U/L (ref 40–130)
ALT SERPL-CCNC: 27 U/L (ref 21–72)
ANION GAP SERPL CALCULATED.3IONS-SCNC: 7 MMOL/L (ref 7–19)
AST SERPL-CCNC: 23 U/L (ref 17–59)
BASOPHILS ABSOLUTE: 0.04 K/UL (ref 0.01–0.08)
BASOPHILS RELATIVE PERCENT: 0.6 % (ref 0.1–1.2)
BILIRUB SERPL-MCNC: 0.3 MG/DL (ref 0.2–1.3)
BUN BLDV-MCNC: 18 MG/DL (ref 9–20)
CALCIUM SERPL-MCNC: 9 MG/DL (ref 8.4–10.2)
CHLORIDE BLD-SCNC: 105 MMOL/L (ref 98–111)
CO2: 31 MMOL/L (ref 22–29)
CREAT SERPL-MCNC: 0.9 MG/DL (ref 0.6–1.2)
EOSINOPHILS ABSOLUTE: 0.09 K/UL (ref 0.04–0.54)
EOSINOPHILS RELATIVE PERCENT: 1.3 % (ref 0.7–7)
GFR NON-AFRICAN AMERICAN: >60
GLOBULIN: 2.1 G/DL
GLUCOSE BLD-MCNC: 87 MG/DL (ref 74–106)
HCT VFR BLD CALC: 40.9 % (ref 40.1–51)
HEMOGLOBIN: 13.3 G/DL (ref 13.7–17.5)
LYMPHOCYTES ABSOLUTE: 1.72 K/UL (ref 1.18–3.74)
LYMPHOCYTES RELATIVE PERCENT: 25.3 % (ref 19.3–53.1)
MCH RBC QN AUTO: 33.9 PG (ref 25.7–32.2)
MCHC RBC AUTO-ENTMCNC: 32.5 G/DL (ref 32.3–36.5)
MCV RBC AUTO: 104.3 FL (ref 79–92.2)
MONOCYTES ABSOLUTE: 0.82 K/UL (ref 0.24–0.82)
MONOCYTES RELATIVE PERCENT: 12.1 % (ref 4.7–12.5)
NEUTROPHILS ABSOLUTE: 4.12 K/UL (ref 1.56–6.13)
NEUTROPHILS RELATIVE PERCENT: 60.7 % (ref 34–71.1)
PDW BLD-RTO: 13.3 % (ref 11.6–14.4)
PLATELET # BLD: 165 K/UL (ref 163–337)
PMV BLD AUTO: 9.3 FL (ref 7.4–10.4)
POTASSIUM SERPL-SCNC: 4.9 MMOL/L (ref 3.5–5.1)
RBC # BLD: 3.92 M/UL (ref 4.63–6.08)
SODIUM BLD-SCNC: 143 MMOL/L (ref 137–145)
TOTAL PROTEIN: 5.7 G/DL (ref 6.3–8.2)
WBC # BLD: 6.79 K/UL (ref 4.23–9.07)

## 2020-10-09 PROCEDURE — 85025 COMPLETE CBC W/AUTO DIFF WBC: CPT

## 2020-10-09 PROCEDURE — 80053 COMPREHEN METABOLIC PANEL: CPT

## 2020-10-09 PROCEDURE — 99214 OFFICE O/P EST MOD 30 MIN: CPT | Performed by: INTERNAL MEDICINE

## 2020-10-09 PROCEDURE — 99211 OFF/OP EST MAY X REQ PHY/QHP: CPT

## 2020-10-09 PROCEDURE — 36415 COLL VENOUS BLD VENIPUNCTURE: CPT

## 2020-10-09 RX ORDER — PROMETHAZINE HYDROCHLORIDE 12.5 MG/1
12.5 TABLET ORAL EVERY 6 HOURS PRN
Qty: 120 TABLET | Refills: 5 | Status: SHIPPED | OUTPATIENT
Start: 2020-10-09

## 2020-10-09 RX ORDER — OXYCODONE AND ACETAMINOPHEN 10; 325 MG/1; MG/1
1 TABLET ORAL EVERY 4 HOURS PRN
Qty: 180 TABLET | Refills: 0 | Status: SHIPPED | OUTPATIENT
Start: 2020-10-09 | End: 2020-11-06 | Stop reason: SDUPTHER

## 2020-10-09 NOTE — PROGRESS NOTES
extremity. · 4/2/2019-MRI brain with contrast showed changes in the frontal convexity with a fullness of the sulcal gyral pattern. Specifically, 4.5 x 4.5 x 3.5 cm right frontal lesion. Second, discrete hyperintense nodule measuring 1.1 x 1.9 x 1.5 cm in the subcortical white matter of the paramedian posterior right frontal lobe immediately above the corpus callosum. This was concerning for high-grade glioma. · 4/15/2019-he underwent a craniotomy with stereotactic biopsy by Dr. Bryanna Hoffmann at [facility]. 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 Further molecular analysis at [facility] revealed IDH1/2 wild type, MGMT non-methylated, TERT mutation. Complex cytogenetics changes A maximum safe resection was not possible due to concerns of significant sequela. Second opinion was recommended at the [facility]. · 5/17/2019-he was seen by Dr. Brett Millan concurrent chemoradiation. · 5/24/2019-initial oncology consultation, Dr. Jenny Crawford concurrent chemoradiation with Temodar. · 6/3/2019. CT scan of the head without contrast documented to ill-defined masslike areas of increased attenuation within the right frontal lobe. Largest measuring 3.3 cm, previously measuring 1.8 cm and second lesion in the lateral aspect of the right frontal lobe measuring up to 1.5 cm. No intracranial hemorrhage or midline shift. · 6/3/2019- MRI of the brain with and without contrast, compared to 4/2/2019 documented masslike appearance at the right frontal lobe measuring 2.9 x 1.4 cm, previously measuring 1.7 x 1.1 cm with mass effect and possible extension into the corpus callosum. No midline shift. · 6/14/2019-CT scan cervical spine without contrast documented no evidence of acute bony injury. Multilevel disc degeneration spondylosis.    · 6/12/2019- Initiated Temodar along with radiation therapy   · 6/17/2019- MRI of the brain with and without contrast documented 3 cm enhancing, partially necrotic tumor in the both the right corpus callosum body, consistent with known astrocytoma. Additional FLAIR signal on both sides of the right central sulcus with faint contrast enhancement in the precentral gyrus, compatible with tumor extension. No hemorrhage or brain herniation. · 6/21/2019 - CT scan of the head without contrast hypodense focus of the right frontal lobe measuring 3.3 cm. No intracranial hemorrhage. No abnormal extra-axial fluid collection. Right frontal craniotomy changes. · 6/12/2019- 7/30/2019 -Chemoradiation for a total of 6000 cGy with Temodar daily. · 8/27/2019- MRI of the brain with and without contrast at [facility] revealed interval decrease in size and mass effect of the solid enhancing nodule now measuring 7 mm compared to 2 cm, suggesting a positive response to treatment. Interval increasing in FLAIR abnormality in the lateral ventricles is suspected to be induced by early radiation changes. No new enhancing nodule in this area  · 3/3/2020 - MRI of the brain with and without contrast documented a slightly decreased abnormal flair signal in the right frontoparietal region. Decrease in the enhancement with only subtle residual enhancement noted with no new regions of abnormal enhancement. · 6/4/2020-MRI of the brain with and without contrast documented residual gliosis in the right parietal white matter. No residual enhancement of the lesion above the right corpus callosum. · 9/8/2020-MRI of the brain with and without contrast documented stable exam without evidence of recurrent disease. · 9/9/2020 CT Head- Stable CT brain is compared to the 12/20/2020 examination.  No acute intracranial process.                            PAST MEDICAL HISTORY:   Past Medical History:   Diagnosis Date    Cancer ([address]     brain    GERD (gastroesophageal reflux disease)     Hyperlipidemia     Hypertension     Seizure ([facility] [address] 07/03/2017          PAST SURGICAL HISTORY:  Past Surgical History:   Procedure Laterality Date    COLONOSCOPY      [place] SHLDR ARTHROSCOP,SURG,CAPSULORRHAPHY Left 2018    SHOULDER ARTHROSCOPIC BANKART REPAIR, ANTERIOR, POSTERIOR, AND INFERIOR LABRAL REPAIR performed [facility] Warner Serrano MD [facility] HISTORY:  Social History     Socioeconomic History    Marital status: Single     Spouse name: None    Number of children: None    Years of education: None    Highest education level: None   Occupational History    None   Social Needs    Financial resource strain: None    Food insecurity     Worry: None     Inability: None    Transportation needs     Medical: None     Non-medical: None   Tobacco Use    Smoking status: Former Smoker     Packs/day: 0.50     Years: 20.00     Pack years: 10.00     Types: Cigarettes     Last attempt to quit: 2015     Years since quittin.2    Smokeless tobacco: Current User   Substance and Sexual Activity    Alcohol use: Yes     Comment: OCC    Drug use: No    Sexual activity: Yes     Partners: Female   Lifestyle    Physical activity     Days per week: None     Minutes per session: None    Stress: None   Relationships    Social connections     Talks on phone: None     Gets together: None     Attends [employer] service: None     Active member of club or organization: None     Attends meetings of clubs or organizations: None     Relationship status: None    Intimate partner violence     Fear of current or ex partner: None     Emotionally abused: None     Physically abused: None     Forced sexual activity: None   Other Topics Concern    None   Social History Narrative    None       FAMILY HISTORY:  No family history on file.      Current Outpatient Medications   Medication Sig Dispense Refill    promethazine (PHENERGAN) 12.5 MG tablet Take 1 tablet by mouth every 6 hours as needed for Nausea 120 tablet 5    oxyCODONE-acetaminophen (PERCOCET)  MG per tablet Take 1 tablet by mouth every 4 hours as needed for Pain for up to 30 days. 180 tablet 0    oxybutynin (DITROPAN) 5 MG tablet Take 5 mg by mouth every 24 hours      solifenacin (VESICARE) 5 MG tablet       tamsulosin (FLOMAX) 0.4 MG capsule Take 0.4 mg by mouth daily      temozolomide (TEMODAR) 100 MG chemo capsule TAKE 3 CAPSULES BY MOUTH DAILY ON DAYS 1 THROUGH 5 OF EACH 28 DAY CYCLE. 15 capsule 4    fluticasone (FLONASE) 50 MCG/ACT nasal spray 2 sprays by Each Nostril route daily 1 Bottle 1    ondansetron (ZOFRAN) 4 MG tablet TAKE 2 TABLETS BY MOUTH EVERY 8 HOURS AS NEEDED FOR NAUSEA OR VOMITING 30 tablet 2    Omega-3 Fatty Acids (FISH OIL) 1000 MG CAPS Fish Oil 340 mg-1,000 mg capsule      aspirin (ASPIR-LOW) 81 MG EC tablet Aspir-Low 81 mg tablet,delayed release   Take 1 tablet every day by oral route.  divalproex (DEPAKOTE) 500 MG DR tablet Take 500 mg by mouth 3 times daily       levETIRAcetam (KEPPRA) 1000 MG tablet Take 1,000 mg by mouth 2 times daily       metoprolol succinate (TOPROL XL) 25 MG extended release tablet Take 100 mg by mouth       vitamin B-6 (PYRIDOXINE) 100 MG tablet       venlafaxine (EFFEXOR XR) 150 MG extended release capsule venlafaxine  mg capsule,extended release 24 hr      lactulose 20 GM/30ML SOLN Take by mouth as needed      ALPRAZolam (XANAX) 0.25 MG tablet Take 0.25 mg by mouth 2 times daily as needed for Sleep.  lacosamide (VIMPAT) 50 MG TABS tablet Take 150 mg by mouth every 12 hours.  omeprazole (PRILOSEC) 10 MG delayed release capsule Take 10 mg by mouth daily      losartan (COZAAR) 100 MG tablet Take 50 mg by mouth daily        No current facility-administered medications for this visit.          REVIEW OF SYSTEMS:    Constitutional: no fever, no night sweats, fatigue;   HEENT: no blurring of vision, no double vision, no hearing difficulty, no tinnitus,no ulceration, no dysphagia  Lungs: no cough, no shortness of breath, no HCT 40.9 10/09/2020    .3 (H) 10/09/2020     10/09/2020    LYMPHOPCT 25.3 10/09/2020    RBC 3.92 (L) 10/09/2020    MCH 33.9 (H) 10/09/2020    MCHC 32.5 10/09/2020    RDW 13.3 10/09/2020     Lab Results   Component Value Date     09/09/2020    K 4.4 09/09/2020     09/09/2020    CO2 30 (H) 09/09/2020    BUN 19 09/09/2020    CREATININE 0.9 09/09/2020    GLUCOSE 93 09/09/2020    CALCIUM 8.9 09/09/2020    PROT 5.8 (L) 09/09/2020    LABALBU 3.6 09/09/2020    BILITOT 0.5 09/09/2020    ALKPHOS 49 09/09/2020    AST 26 09/09/2020    ALT 25 09/09/2020    LABGLOM >60 09/09/2020    GFRAA 89 02/14/2020    AGRATIO 1.9 02/14/2020    GLOB 2.1 09/09/2020       Relevant Imaging studies/reviewed by me:  No results found. ASSESSMENT    Orders Placed This Encounter   Procedures    Comprehensive Metabolic Panel     Standing Status:   Future     Number of Occurrences:   1     Standing Expiration Date:   10/9/2021      Mar Ahumada was seen today for follow-up. Diagnoses and all orders for this visit:    Anaplastic glioma of brain ([facility] [address]  -     promethazine (PHENERGAN) 12.5 MG tablet; Take 1 tablet by mouth every 6 hours as needed for Nausea  -     oxyCODONE-acetaminophen (PERCOCET)  MG per tablet; Take 1 tablet by mouth every 4 hours as needed for Pain for up to 30 days. -     Comprehensive Metabolic Panel; Future    Cancer associated pain  -     oxyCODONE-acetaminophen (PERCOCET)  MG per tablet; Take 1 tablet by mouth every 4 hours as needed for Pain for up to 30 days. Chemotherapy management, encounter for    Chemotherapy induced nausea and vomiting  -     promethazine (PHENERGAN) 12.5 MG tablet; Take 1 tablet by mouth every 6 hours as needed for Nausea    Adverse effect of chemotherapy, subsequent encounter    Easy bruising    Care plan discussed with patient         1. Anaplastic glioma, unresectable MGMT un-methylated.  Status post combined modality therapy with radiation and Temodar.   Continues to take palliative Temodar 150 mg/m² days 1 through 5 every 28 days. Has overall been tolerating treatment fairly well with the exception of nausea. MRI of the brain on 9/8/2020 continues show positive response to treatment. Completed day 5 yesterday, 9/8/2020.     -Continue Temodar  -Anticipate MRI of the brain and follow-up appointment with Dr. Oneal Duval in 3 months  -CBC and CMP today     2. Fatigue due to treatment and disease process. Continues to have grade 2 fatigue. He has had some difficulty continuing with physical therapy due to multiple reasons.   -Encouraged increasing activity as tolerated      3. Chronic pain of both knees, no change from previous evaluations  -Follow-up with Dr. Carmina Hunt  -Resume physical therapy as tolerated  -Continue to follow with orthopedic clinic     4. Requires continuous supervision for activities of daily living (ADL) secondary to disease process and seizure activity.   -Seizure activity continues to be controlled with anticonvulsants managed by neurology      5. 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 Unfortunately intolerant to long-acting medications, experiences hallucinations and significant lethargy. Reports pain is overall controlled as long as he is taking Percocet every 4 hours.     -Continue Percocet 10/325 mg 1 tablet every 4 hours as needed for pain. New prescription provided.     6. Side effect monitoring of chemotherapy  -Fatigue grade 2, slightly worsened from previous evaluation  -Nausea with Temodar, continue Zofran as needed     7. Hypertension -Raúl's mother reported that his blood pressure was elevated prior to his fall this morning, 174/110 and after his fall it was 155/121. BP is 146/88, encouraged her to follow-up with VLADIMIR Garcia for blood pressure recommendations. He is currently taking losartan and metoprolol.     8.   Change in level of consciousness after a recent fall with protruding bump on posterior head and increased lethargy. Velvet Sox this morning and has been lethargic at times since the fall. He is oriented x4 although closes eyes and appears to be nodding off during office visit. He denies any vision changes or headaches.   -Schedule a stat CT scan of the head for further evaluation. 9. Easy bruising- Stop Aspirin.       FOLLOW UP:  1. Follow-up appointment given for 4 weeks with [place]  2. Discontinue Aspirin  3. CMP   4. Continue to follow with other medical providers as recommended       Follow Up:     Return in about 4 weeks (around 11/6/2020) for appointment with Joey Tapia Later, am scribing for Eyal Woody MD. Electronically signed by Sierra Burnette RN on 10/9/2020 at 9:56 PM CDT. I, Dr Ras Estrada, personally performed the services described in this documentation as scribed by Sierra Burnette RN in my presence and is both accurate and complete. I, Dr Ras Estrada, personally performed the services described in this documentation as scribed by Yumiko Childers MA in my presence and is both accurate and complete. Over 50% of the total visit time of 25 minutes in face to face encounter with the patient, out of which more than 50% of the time was spent in counseling patient or family and coordination of care. Counseling included but was not limited to time spent reviewing labs, imaging studies/ treatment plan and answering questions.

## 2020-10-13 DIAGNOSIS — F33.1 MODERATE EPISODE OF RECURRENT MAJOR DEPRESSIVE DISORDER (HCC): ICD-10-CM

## 2020-10-13 RX ORDER — VENLAFAXINE HYDROCHLORIDE 150 MG/1
CAPSULE, EXTENDED RELEASE ORAL
Qty: 90 CAPSULE | Refills: 1 | OUTPATIENT
Start: 2020-10-13

## 2020-10-23 RX ORDER — ONDANSETRON 4 MG/1
8 TABLET, FILM COATED ORAL EVERY 8 HOURS PRN
Qty: 30 TABLET | Refills: 2 | Status: SHIPPED | OUTPATIENT
Start: 2020-10-23

## 2020-11-06 ENCOUNTER — OFFICE VISIT (OUTPATIENT)
Dept: HEMATOLOGY | Age: 55
End: 2020-11-06
Payer: MEDICAID

## 2020-11-06 ENCOUNTER — HOSPITAL ENCOUNTER (OUTPATIENT)
Dept: INFUSION THERAPY | Age: 55
Discharge: HOME | End: 2020-11-06
Payer: MEDICAID

## 2020-11-06 VITALS
HEART RATE: 61 BPM | TEMPERATURE: 97.8 F | OXYGEN SATURATION: 95 % | WEIGHT: 200.7 LBS | HEIGHT: 68 IN | BODY MASS INDEX: 30.42 KG/M2 | SYSTOLIC BLOOD PRESSURE: 120 MMHG | DIASTOLIC BLOOD PRESSURE: 76 MMHG

## 2020-11-06 DIAGNOSIS — D64.81 ANEMIA ASSOCIATED WITH CHEMOTHERAPY: ICD-10-CM

## 2020-11-06 DIAGNOSIS — T45.1X5A ANEMIA ASSOCIATED WITH CHEMOTHERAPY: ICD-10-CM

## 2020-11-06 LAB
ALBUMIN SERPL-MCNC: 3.7 G/DL (ref 3.5–5.2)
ALP BLD-CCNC: 45 U/L (ref 40–130)
ALT SERPL-CCNC: 29 U/L (ref 21–72)
ANION GAP SERPL CALCULATED.3IONS-SCNC: 7 MMOL/L (ref 7–19)
AST SERPL-CCNC: 31 U/L (ref 17–59)
BASOPHILS ABSOLUTE: 0.01 K/UL (ref 0.01–0.08)
BASOPHILS RELATIVE PERCENT: 0.2 % (ref 0.1–1.2)
BILIRUB SERPL-MCNC: 0.4 MG/DL (ref 0.2–1.3)
BUN BLDV-MCNC: 17 MG/DL (ref 9–20)
CALCIUM SERPL-MCNC: 9.1 MG/DL (ref 8.4–10.2)
CHLORIDE BLD-SCNC: 106 MMOL/L (ref 98–111)
CO2: 31 MMOL/L (ref 22–29)
CREAT SERPL-MCNC: 1 MG/DL (ref 0.6–1.2)
EOSINOPHILS ABSOLUTE: 0.07 K/UL (ref 0.04–0.54)
EOSINOPHILS RELATIVE PERCENT: 1.2 % (ref 0.7–7)
GFR NON-AFRICAN AMERICAN: >60
GLOBULIN: 2 G/DL
GLUCOSE BLD-MCNC: 83 MG/DL (ref 74–106)
HCT VFR BLD CALC: 37.3 % (ref 40.1–51)
HEMOGLOBIN: 13 G/DL (ref 13.7–17.5)
LYMPHOCYTES ABSOLUTE: 1.73 K/UL (ref 1.18–3.74)
LYMPHOCYTES RELATIVE PERCENT: 30.2 % (ref 19.3–53.1)
MCH RBC QN AUTO: 33.9 PG (ref 25.7–32.2)
MCHC RBC AUTO-ENTMCNC: 34.9 G/DL (ref 32.3–36.5)
MCV RBC AUTO: 97.4 FL (ref 79–92.2)
MONOCYTES ABSOLUTE: 0.82 K/UL (ref 0.24–0.82)
MONOCYTES RELATIVE PERCENT: 14.3 % (ref 4.7–12.5)
NEUTROPHILS ABSOLUTE: 3.1 K/UL (ref 1.56–6.13)
NEUTROPHILS RELATIVE PERCENT: 54.1 % (ref 34–71.1)
PDW BLD-RTO: 12.3 % (ref 11.6–14.4)
PLATELET # BLD: 172 K/UL (ref 163–337)
PMV BLD AUTO: 9.6 FL (ref 7.4–10.4)
POTASSIUM SERPL-SCNC: 3.9 MMOL/L (ref 3.5–5.1)
RBC # BLD: 3.83 M/UL (ref 4.63–6.08)
SODIUM BLD-SCNC: 144 MMOL/L (ref 137–145)
TOTAL PROTEIN: 5.8 G/DL (ref 6.3–8.2)
WBC # BLD: 5.73 K/UL (ref 4.23–9.07)

## 2020-11-06 PROCEDURE — 85025 COMPLETE CBC W/AUTO DIFF WBC: CPT

## 2020-11-06 PROCEDURE — 99211 OFF/OP EST MAY X REQ PHY/QHP: CPT

## 2020-11-06 PROCEDURE — 80053 COMPREHEN METABOLIC PANEL: CPT

## 2020-11-06 PROCEDURE — 99213 OFFICE O/P EST LOW 20 MIN: CPT | Performed by: NURSE PRACTITIONER

## 2020-11-06 RX ORDER — FUROSEMIDE 20 MG/1
20 TABLET ORAL DAILY
Qty: 30 TABLET | Refills: 0 | Status: SHIPPED | OUTPATIENT
Start: 2020-11-06

## 2020-11-06 RX ORDER — OXYCODONE AND ACETAMINOPHEN 10; 325 MG/1; MG/1
1 TABLET ORAL EVERY 4 HOURS PRN
Qty: 180 TABLET | Refills: 0 | Status: SHIPPED | OUTPATIENT
Start: 2020-11-06 | End: 2020-12-04 | Stop reason: SDUPTHER

## 2020-11-06 ASSESSMENT — ENCOUNTER SYMPTOMS
SORE THROAT: 0
CONSTIPATION: 0
EYES NEGATIVE: 1
EYE DISCHARGE: 0
SHORTNESS OF BREATH: 0
EYE PAIN: 0
DIARRHEA: 0
COUGH: 0
BACK PAIN: 1
ABDOMINAL PAIN: 0
VOMITING: 0
WHEEZING: 0
EYE REDNESS: 0
BLOOD IN STOOL: 0
RESPIRATORY NEGATIVE: 1

## 2020-11-06 NOTE — PROGRESS NOTES
Progress Note      Pt Name: Coty Flanagan  YOB: 1965  MRN: [id number]    Date of evaluation: 11/6/2020  History Obtained From:  patient, electronic medical record    CHIEF COMPLAINT:    Chief Complaint   Patient presents with    Follow-up     Anaplastic glioma of brain      HISTORY OF PRESENT ILLNESS:    Coty Flanagan is a 48 y.o.  male with a significant past medical history of anaplastic glioma, diagnosed April 2019 and placed on palliative Temodar on 8/29/2019. Zoe Gaines is currently taking Temodar 150 mg/m² days 1 through 5 every 28 days and has continued to have a positive response and overall tolerating treatment well. He is routinely followed by Dr. Radha Garibay with MRI of the brain every 3 months, last completed on 9/8/2020 that continue to show no evidence of recurrent disease. He returns today in scheduled follow-up for evaluation, side effect monitoring, lab monitoring and consideration to continue with Temodar. Zoe Gaines is accompanied by his mother as always, she is his primary caregiver. He is scheduled to follow-up for an MRI of the brain on 12/7/2018 with Dr. Radha Garibay on 12/8/2020. Zoe Gaines presents today in a wheelchair, he has continued to have progressive lower extremity weakness which is secondary to his joints in his knees. He reports falling at least 1-2 times a week, \" my left knee just gives out\". He has been evaluated by orthopedics and reports that he will be starting with a new physical therapist next week. Zoe Gaines continues to have intermittent headaches. He denies any vision changes or seizure activity, he continues with his anticonvulsant drug regimen as per neurology. He reports experiencing some nausea on the days that he is taking Temodar that is controlled with the Zofran.     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 · 8/29/2019- anticipate initiating Temadar 150 mg/m2 days 1-5 on a 28 day regimen     Cancer history  Mr Julia Narayanan was seen in initial oncology consultation by Dr. Berry Hanks on 5/24/2019 referred by Dr. Lacie Alamo for diagnosis of primary brain tumor, grade 3 astrocytoma. Fabian Edward was being seen by neurology with complaints of left facial and upper extremity. · 4/2/2019-MRI brain with contrast showed changes in the frontal convexity with a fullness of the sulcal gyral pattern. Specifically, 4.5 x 4.5 x 3.5 cm right frontal lesion. Second, discrete hyperintense nodule measuring 1.1 x 1.9 x 1.5 cm in the subcortical white matter of the paramedian posterior right frontal lobe immediately above the corpus callosum. This was concerning for high-grade glioma. · 4/15/2019-he underwent a craniotomy with stereotactic biopsy by Dr. Bob Wolf at [facility]. 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 Further molecular analysis at [facility] revealed IDH1/2 wild type, MGMT non-methylated, TERT mutation. Complex cytogenetics changes A maximum safe resection was not possible due to concerns of significant sequela. Second opinion was recommended at the [facility]. · 5/17/2019-he was seen by Dr. Israel Silvestre. Recommended concurrent chemoradiation. · 5/24/2019-initial oncology consultation, Dr. Berry Hanks recommended concurrent chemoradiation with Temodar. · 6/3/2019. CT scan of the head without contrast documented to ill-defined masslike areas of increased attenuation within the right frontal lobe. Largest measuring 3.3 cm, previously measuring 1.8 cm and second lesion in the lateral aspect of the right frontal lobe measuring up to 1.5 cm. No intracranial hemorrhage or midline shift.    · 6/3/2019- MRI of the brain with and without contrast, compared to 4/2/2019 documented masslike appearance at the right frontal lobe measuring 2.9 x 1.4 cm, previously measuring 1.7 x 1.1 cm with mass effect and possible extension into the corpus callosum. No midline shift. · 6/14/2019-CT scan cervical spine without contrast documented no evidence of acute bony injury. Multilevel disc degeneration spondylosis. · 6/12/2019- Initiated Temodar along with radiation therapy   · 6/17/2019- MRI of the brain with and without contrast documented 3 cm enhancing, partially necrotic tumor in the both the right corpus callosum body, consistent with known astrocytoma. Additional FLAIR signal on both sides of the right central sulcus with faint contrast enhancement in the precentral gyrus, compatible with tumor extension. No hemorrhage or brain herniation. · 6/21/2019 - CT scan of the head without contrast hypodense focus of the right frontal lobe measuring 3.3 cm. No intracranial hemorrhage. No abnormal extra-axial fluid collection. Right frontal craniotomy changes. · 6/12/2019- 7/30/2019 -Chemoradiation for a total of 6000 cGy with Temodar daily. · 8/27/2019- MRI of the brain with and without contrast at [facility] revealed interval decrease in size and mass effect of the solid enhancing nodule now measuring 7 mm compared to 2 cm, suggesting a positive response to treatment. Interval increasing in FLAIR abnormality in the lateral ventricles is suspected to be induced by early radiation changes. No new enhancing nodule in this area  · 3/3/2020 - MRI of the brain with and without contrast documented a slightly decreased abnormal flair signal in the right frontoparietal region. Decrease in the enhancement with only subtle residual enhancement noted with no new regions of abnormal enhancement. · 6/4/2020-MRI of the brain with and without contrast documented residual gliosis in the right parietal white matter.   No residual enhancement of the lesion above the right corpus callosum. · 9/8/2020-MRI of the brain with and without contrast documented stable exam without evidence of recurrent disease. Past Medical History:    Past Medical History:   Diagnosis Date    Cancer ([facility] [address]     brain    GERD (gastroesophageal reflux disease)     Hyperlipidemia     Hypertension     Seizure ([facility] [address] 07/03/2017       Past Surgical History:    Past Surgical History:   Procedure Laterality Date    COLONOSCOPY      NM SHLDR ARTHROSCOP,SURG,CAPSULORRHAPHY Left 7/2/2018    SHOULDER ARTHROSCOPIC BANKART REPAIR, ANTERIOR, POSTERIOR, AND INFERIOR LABRAL REPAIR performed by Christa Rondon MD at [facility]       Current Medications:    Current Outpatient Medications   Medication Sig Dispense Refill    furosemide (LASIX) 20 MG tablet Take 1 tablet by mouth daily 30 tablet 0    ondansetron (ZOFRAN) 4 MG tablet TAKE 2 TABLETS BY MOUTH EVERY 8 HOURS AS NEEDED FOR NAUSEA OR VOMITING 30 tablet 2    promethazine (PHENERGAN) 12.5 MG tablet Take 1 tablet by mouth every 6 hours as needed for Nausea 120 tablet 5    oxybutynin (DITROPAN) 5 MG tablet Take 5 mg by mouth every 24 hours      solifenacin (VESICARE) 5 MG tablet       temozolomide (TEMODAR) 100 MG chemo capsule TAKE 3 CAPSULES BY MOUTH DAILY ON DAYS 1 THROUGH 5 OF EACH 28 DAY CYCLE. 15 capsule 4    fluticasone (FLONASE) 50 MCG/ACT nasal spray 2 sprays by Each Nostril route daily 1 Bottle 1    Omega-3 Fatty Acids (FISH OIL) 1000 MG CAPS Fish Oil 340 mg-1,000 mg capsule      aspirin (ASPIR-LOW) 81 MG EC tablet Aspir-Low 81 mg tablet,delayed release   Take 1 tablet every day by oral route.       divalproex (DEPAKOTE) 500 MG DR tablet Take 500 mg by mouth 3 times daily       levETIRAcetam (KEPPRA) 1000 MG tablet Take 1,000 mg by mouth 2 times daily       metoprolol succinate (TOPROL XL) 25 MG extended release tablet Take 100 mg by mouth       vitamin B-6 (PYRIDOXINE) 100 MG tablet       venlafaxine (EFFEXOR XR) 150 MG extended release capsule venlafaxine  mg capsule,extended release 24 hr      lactulose 20 GM/30ML SOLN Take by mouth as needed      ALPRAZolam (XANAX) 0.25 MG tablet Take 0.25 mg by mouth 2 times daily as needed for Sleep.  lacosamide (VIMPAT) 50 MG TABS tablet Take 150 mg by mouth every 12 hours.  omeprazole (PRILOSEC) 10 MG delayed release capsule Take 10 mg by mouth daily      losartan (COZAAR) 100 MG tablet Take 50 mg by mouth daily       oxyCODONE-acetaminophen (PERCOCET)  MG per tablet Take 1 tablet by mouth every 4 hours as needed for Pain for up to 30 days. 180 tablet 0     No current facility-administered medications for this visit. Allergies: Allergies   Allergen Reactions    Pregabalin Other (See Comments)    Lisinopril Swelling     BRAIN SWELLING/COMA    Lipitor [Atorvastatin] Rash       Social History:    Social History     Tobacco Use    Smoking status: Former Smoker     Packs/day: 0.50     Years: 20.00     Pack years: 10.00     Types: Cigarettes     Last attempt to quit: 2015     Years since quittin.3    Smokeless tobacco: Current User   Substance Use Topics    Alcohol use: Yes     Comment: OCC    Drug use: No       Family History:   No family history on file. Vitals:  Vitals:    20 1227   BP: 120/76   Pulse: 61   Temp: 97.8 °F (36.6 °C)   SpO2: 95%   Weight: 200 lb 11.2 oz (91 kg)   Height: 5' 8\" (1.727 m)        Subjective   REVIEW OF SYSTEMS:   Review of Systems   Constitutional: Positive for fatigue. Negative for chills, diaphoresis and fever. HENT: Negative. Negative for congestion, ear pain, hearing loss, nosebleeds, sore throat and tinnitus. Eyes: Negative. Negative for pain, discharge and redness. Respiratory: Negative. Negative for cough, shortness of breath and wheezing. Cardiovascular: Positive for leg swelling. Negative for chest pain and palpitations. Gastrointestinal: Positive for nausea.  Negative for abdominal abdominal tenderness. There is no guarding or rebound. Hernia: No hernia is present. Musculoskeletal:         General: No tenderness or deformity. Right lower leg: Edema present. Left lower leg: Edema present. Comments: Range of motion within normal limits x4 extremities   Skin:     General: Skin is warm. Coloration: Skin is not pale. Findings: Bruising (Right upper extremity) present. No erythema or rash. Neurological:      Mental Status: He is alert and oriented to person, place, and time. Cranial Nerves: No cranial nerve deficit. Motor: Weakness (Bilateral lower extremity left greater than right) present. Coordination: Coordination normal.      Gait: Gait abnormal.   Psychiatric:         Behavior: Behavior normal.         Thought Content: Thought content normal.         Labs: WBC 5.73, ANC 3.10, hemoglobin 13, MCV 97.4 and a platelet count of 862,996  CBC:   No results for input(s): WBC, HGB, PLT in the last 72 hours. CMP: No results for input(s): GLUCOSE, BUN, CREATININE, BCR, CO2, CALCIUM, ALBUMIN, LABIL2, ALKPHOS, AST, ALT in the last 72 hours. Invalid input(s): EGFRIFNONA, EGFRIFAFRI, SODIUM, POTASSIUM, CHLORIDE, PROTENTOTREF, GLOBULIN, BILIRUBIN  Hepatic: No results for input(s): AST, ALT, ALB, BILITOT, ALKPHOS in the last 72 hours. Troponin: No results for input(s): TROPONINI in the last 72 hours. BNP: No results for input(s): BNP in the last 72 hours. Lipids: No results for input(s): CHOL, HDL in the last 72 hours. Invalid input(s): LDL  INR: No results for input(s): INR in the last 72 hours. ABG: No results for input(s): PHART, YOV1GUG, PO2ART, QNW7BHS, W1UCQELY, BEART in the last 72 hours. 30 Day lookback of cultures:    Blood Culture Recent: No results for input(s): BC in the last 720 hours. Gram Stain Recent: No results for input(s): LABGRAM in the last 720 hours.   Resp Culture Recent: No results for input(s): CULTRESP in the last 720 hours.  Body Fluid Recent : No results for input(s): BFCX in the last 720 hours. MRSA Recent : No results for input(s): [address] in the last 720 hours. Urine Culture Recent : No results for input(s): LABURIN in the last 720 hours. Organism Recent : No results for input(s): ORG in the last 720 hours. ASSESSMENT/PLAN:      1. Anaplastic glioma, unresectable MGMT un-methylated. Continues to take palliative Temodar 150 mg/m² days 1 through 5 every 28 days. He continues to tolerate treatment fairly well with the exception of fatigue and nausea while taking Temodar. MRI of the brain on 9/8/2020 continues show positive response to treatment.     -Taking Temodar days 1 through 5 every 28 days  - Anticipating repeating MRI of the brain on 12/7/2020 and follow-up with Dr. Lázaro Cárdenas on 12/8/2020  -CBC and CMP today and every visit    2. Fatigue due to treatment and disease process. He continues to have grade 2-3 fatigue.   -Increase activity with supervision as tolerated      3. Chronic pain of both knees, increased weakness in the left knee leading to frequent falls  -Follow-up with Dr. Morena Hunt and orthopedic clinic as recommended  -Anticipating initiating physical therapy next week    4. Requires continuous supervision for activities of daily living (ADL) secondary to disease process and seizure activity.   -Seizure activity continues to be controlled with anticonvulsants managed by neurology     5. 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 Unfortunately intolerant to long-acting medications, experiences hallucinations and significant lethargy. Reports pain is overall controlled as long as he is taking Percocet every 4 hours.      -Continue Percocet 10/325 mg 1 tablet every 4 hours as needed for pain, prescription provided by Dr. Daniel August    6.   Side effect monitoring of chemotherapy  -Fatigue grade 2-3 weakness to lower extremities  -Nausea while taking Temodar, continue Zofran for 8 hours    7. Hypertension -/76 today with a heart rate of 61, significantly and proved. Only taking losartan, metoprolol  mg daily and Lasix 20 mg daily under the direction of VLADIMIR Rivas       FOLLOW UP:  1. Follow-up appointment given for 4 weeks, sooner if needed  2. Continue to follow with other medical providers as recommended    Over 50% of the total visit time of 15 minutes in face to face encounter with the patient, out of which more than 50% of the time was spent in counseling patient  and coordination of care. Counseling included but was not limited to time spent reviewing labs, imaging studies/ treatment plan and answering questions. This does not include charting. Discussed precautions related to [address] and being at increased risk. Discussed proper handwashing to be done frequently, limit exposure to other individuals and maintain social distancing of 6 feet. Recommend contacting primary care provider if having respiratory symptoms for further recommendations and consideration for testing.     (Please note that portions of this note were completed with a voice recognition program. Efforts were made to edit the dictations but occasionally words are mis-transcribed.)      Electronically signed by VLADIMIR Walker on 11/13/2020 at 4:06 PM

## 2020-11-13 ASSESSMENT — ENCOUNTER SYMPTOMS: NAUSEA: 1

## 2020-11-23 NOTE — PLAN OF CARE
Problem: Patient Care Overview  Goal: Plan of Care Review  Outcome: Ongoing (interventions implemented as appropriate)   19 1343   Coping/Psychosocial   Plan of Care Reviewed With patient   Plan of Care Review   Progress improving   OTHER   Outcome Summary Bedside Swallow Evaluation completed. No overt s/s of aspiration. Patient does have harsh vocal quality and left side weakness. Family reports pocketing on the left. SLP observed some left buccal residue but he was able to clear with cues. Educated on compensatory strategies. Rec: 1) Ok for Regular diet with thin liquids 2) Small bites and sips 3) Place food on right side of oral cavity 4) Liquid wash to help clear residue 5) Oral  after meals 6) ST to follow for work on compensatory strategies.          [employer] Preamble Text (Leave Blank If You Do Not Want): Undermining was performed with blunt dissection.

## 2020-12-02 RX ORDER — TEMOZOLOMIDE 100 MG/1
CAPSULE ORAL
Qty: 15 CAPSULE | Refills: 3 | Status: SHIPPED | OUTPATIENT
Start: 2020-12-02

## 2020-12-04 ENCOUNTER — OFFICE VISIT (OUTPATIENT)
Dept: HEMATOLOGY | Age: 55
End: 2020-12-04
Payer: MEDICAID

## 2020-12-04 ENCOUNTER — HOSPITAL ENCOUNTER (OUTPATIENT)
Dept: INFUSION THERAPY | Age: 55
Discharge: HOME | End: 2020-12-04
Payer: MEDICAID

## 2020-12-04 VITALS
BODY MASS INDEX: 30.31 KG/M2 | DIASTOLIC BLOOD PRESSURE: 84 MMHG | SYSTOLIC BLOOD PRESSURE: 146 MMHG | TEMPERATURE: 97.1 F | WEIGHT: 200 LBS | OXYGEN SATURATION: 99 % | HEART RATE: 79 BPM | HEIGHT: 68 IN

## 2020-12-04 DIAGNOSIS — C71.9 ANAPLASTIC GLIOMA OF BRAIN (HCC): ICD-10-CM

## 2020-12-04 DIAGNOSIS — D64.81 ANEMIA ASSOCIATED WITH CHEMOTHERAPY: ICD-10-CM

## 2020-12-04 DIAGNOSIS — T45.1X5A ANEMIA ASSOCIATED WITH CHEMOTHERAPY: ICD-10-CM

## 2020-12-04 LAB
ALBUMIN SERPL-MCNC: 3.5 G/DL (ref 3.5–5.2)
ALP BLD-CCNC: 63 U/L (ref 40–130)
ALT SERPL-CCNC: 25 U/L (ref 21–72)
ANION GAP SERPL CALCULATED.3IONS-SCNC: 12 MMOL/L (ref 7–19)
AST SERPL-CCNC: 28 U/L (ref 17–59)
BASOPHILS ABSOLUTE: 0.03 K/UL (ref 0.01–0.08)
BASOPHILS RELATIVE PERCENT: 0.4 % (ref 0.1–1.2)
BILIRUB SERPL-MCNC: 0.5 MG/DL (ref 0.2–1.3)
BUN BLDV-MCNC: 15 MG/DL (ref 9–20)
CALCIUM SERPL-MCNC: 9.4 MG/DL (ref 8.4–10.2)
CHLORIDE BLD-SCNC: 103 MMOL/L (ref 98–111)
CO2: 29 MMOL/L (ref 22–29)
CREAT SERPL-MCNC: 0.8 MG/DL (ref 0.6–1.2)
EOSINOPHILS ABSOLUTE: 0.19 K/UL (ref 0.04–0.54)
EOSINOPHILS RELATIVE PERCENT: 2.8 % (ref 0.7–7)
GFR NON-AFRICAN AMERICAN: >60
GLOBULIN: 2.4 G/DL
GLUCOSE BLD-MCNC: 107 MG/DL (ref 74–106)
HCT VFR BLD CALC: 44.2 % (ref 40.1–51)
HEMOGLOBIN: 14.7 G/DL (ref 13.7–17.5)
LYMPHOCYTES ABSOLUTE: 1.62 K/UL (ref 1.18–3.74)
LYMPHOCYTES RELATIVE PERCENT: 23.6 % (ref 19.3–53.1)
MCH RBC QN AUTO: 33 PG (ref 25.7–32.2)
MCHC RBC AUTO-ENTMCNC: 33.3 G/DL (ref 32.3–36.5)
MCV RBC AUTO: 99.3 FL (ref 79–92.2)
MONOCYTES ABSOLUTE: 0.96 K/UL (ref 0.24–0.82)
MONOCYTES RELATIVE PERCENT: 14 % (ref 4.7–12.5)
NEUTROPHILS ABSOLUTE: 4.06 K/UL (ref 1.56–6.13)
NEUTROPHILS RELATIVE PERCENT: 59.2 % (ref 34–71.1)
PDW BLD-RTO: 12.9 % (ref 11.6–14.4)
PLATELET # BLD: 174 K/UL (ref 163–337)
PMV BLD AUTO: 10.1 FL (ref 7.4–10.4)
POTASSIUM SERPL-SCNC: 3.9 MMOL/L (ref 3.5–5.1)
RBC # BLD: 4.45 M/UL (ref 4.63–6.08)
SODIUM BLD-SCNC: 144 MMOL/L (ref 137–145)
TOTAL PROTEIN: 5.9 G/DL (ref 6.3–8.2)
WBC # BLD: 6.86 K/UL (ref 4.23–9.07)

## 2020-12-04 PROCEDURE — 99214 OFFICE O/P EST MOD 30 MIN: CPT | Performed by: NURSE PRACTITIONER

## 2020-12-04 PROCEDURE — 99211 OFF/OP EST MAY X REQ PHY/QHP: CPT

## 2020-12-04 PROCEDURE — 80053 COMPREHEN METABOLIC PANEL: CPT

## 2020-12-04 PROCEDURE — 85025 COMPLETE CBC W/AUTO DIFF WBC: CPT

## 2020-12-04 RX ORDER — OXYCODONE AND ACETAMINOPHEN 10; 325 MG/1; MG/1
1 TABLET ORAL EVERY 4 HOURS PRN
Qty: 180 TABLET | Refills: 0 | Status: SHIPPED | OUTPATIENT
Start: 2020-12-04 | End: 2021-01-03

## 2020-12-04 ASSESSMENT — ENCOUNTER SYMPTOMS
EYE PAIN: 0
WHEEZING: 0
RESPIRATORY NEGATIVE: 1
SHORTNESS OF BREATH: 0
EYES NEGATIVE: 1
BLOOD IN STOOL: 0
SORE THROAT: 0
COUGH: 0
ABDOMINAL PAIN: 0
CONSTIPATION: 0
EYE DISCHARGE: 0
EYE REDNESS: 0
DIARRHEA: 0
VOMITING: 0

## 2020-12-04 NOTE — PROGRESS NOTES
Progress Note      Pt Name: Laurence Sheldon  YOB: 1965  MRN: [id number]    Date of evaluation: 12/4/2020   History Obtained From:  patient, electronic medical record    CHIEF COMPLAINT:    Chief Complaint   Patient presents with    Follow-up     Anaplastic glioma of brain (     HISTORY OF PRESENT ILLNESS:    Laurence Sheldon is a 48 y.o.  male with a significant past medical history of anaplastic glioma, diagnosed April 2019 and placed on palliative Temodar on 8/29/2019. Ousmane Palmer is currently taking Temodar 150 mg/m² days 1 through 5 every 28 days and has continued to have a positive response and overall tolerating treatment well. He is routinely followed by Dr. Katerina Isaac with MRI of the brain every 3 months, last completed on 9/8/2020 that continue to show no evidence of recurrent disease. Ousmane Palmer has been experiencing a decline in performance status over the past several months with experiencing increased frequent falls and lower extremity weakness along with pain to his knees and joints. He returns today in scheduled follow-up for evaluation, side effect monitoring and lab monitoring. He is accompanied by his mother as always, she is his primary caregiver and she also voices concerned of his overall decreased performance status. Ousmane Palmer reports that he continues to tolerate the Temodar without significant difficulty other than the nausea which is overall controlled with Zofran. He is scheduled for MRI of the brain and follow-up with Dr. Katerina Isaac on 12/7/2020. Today Ousmane Palmer and his mother indicate that he is no longer ambulating and walking outside as he previously was and now is requiring a wheelchair for mobility within and out of the house. He reports that he is unable to stand for even short periods of time. He denies any seizure activity.   He has attempted outpatient physical therapy and unfortunately he is having significant difficulty getting in and out of the vehicle. Ms. Lexy Magana (his mother) also verbalizes concern related to Ragena Goodpasture having episodes of where he appears to be \"zoning in and out\" and having some difficulty with word finding at times. Physical examination today did not reveal any signs or symptoms of acute stroke although increase in left-sided weakness is noted. Solis Latif also has complaint of severe tailbone pain, he reports this began after he fell. Offered to obtain a plain film x-ray and he declined at this time. 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 · 8/29/2019- anticipate initiating Temadar 150 mg/m2 days 1-5 on a 28 day regimen     Cancer history  Mr Jensen Holguin was seen in initial oncology consultation by Dr. Bay Garcia on 5/24/2019 referred by Dr. Radha Weiss for diagnosis of primary brain tumor, grade 3 astrocytoma. Ragena Goodpasture was being seen by neurology with complaints of left facial and upper extremity. · 4/2/2019-MRI brain with contrast showed changes in the frontal convexity with a fullness of the sulcal gyral pattern. Specifically, 4.5 x 4.5 x 3.5 cm right frontal lesion. Second, discrete hyperintense nodule measuring 1.1 x 1.9 x 1.5 cm in the subcortical white matter of the paramedian posterior right frontal lobe immediately above the corpus callosum. This was concerning for high-grade glioma. · 4/15/2019-he underwent a craniotomy with stereotactic biopsy by Dr. Juanis Leigh at [facility]. 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 Further molecular analysis at [facility] revealed IDH1/2 wild type, MGMT non-methylated, TERT mutation. Complex cytogenetics changes A maximum safe resection was not possible due to concerns of significant sequela.  Second opinion was recommended at the [facility]. · 5/17/2019-he was seen by Dr. Rosie Ascencio. Recommended concurrent chemoradiation. · 5/24/2019-initial oncology consultation, Dr. Momo Hi recommended concurrent chemoradiation with Temodar. · 6/3/2019. CT scan of the head without contrast documented to ill-defined masslike areas of increased attenuation within the right frontal lobe. Largest measuring 3.3 cm, previously measuring 1.8 cm and second lesion in the lateral aspect of the right frontal lobe measuring up to 1.5 cm. No intracranial hemorrhage or midline shift. · 6/3/2019- MRI of the brain with and without contrast, compared to 4/2/2019 documented masslike appearance at the right frontal lobe measuring 2.9 x 1.4 cm, previously measuring 1.7 x 1.1 cm with mass effect and possible extension into the corpus callosum. No midline shift. · 6/14/2019-CT scan cervical spine without contrast documented no evidence of acute bony injury. Multilevel disc degeneration spondylosis. · 6/12/2019- Initiated Temodar along with radiation therapy   · 6/17/2019- MRI of the brain with and without contrast documented 3 cm enhancing, partially necrotic tumor in the both the right corpus callosum body, consistent with known astrocytoma. Additional FLAIR signal on both sides of the right central sulcus with faint contrast enhancement in the precentral gyrus, compatible with tumor extension. No hemorrhage or brain herniation. · 6/21/2019 - CT scan of the head without contrast hypodense focus of the right frontal lobe measuring 3.3 cm. No intracranial hemorrhage. No abnormal extra-axial fluid collection. Right frontal craniotomy changes. · 6/12/2019- 7/30/2019 -Chemoradiation for a total of 6000 cGy with Temodar daily.   · 8/27/2019- MRI of the brain with and without contrast at [facility] revealed interval decrease in size and mass effect of the solid enhancing nodule now measuring 7 mm compared to 2 cm, suggesting a fluticasone (FLONASE) 50 MCG/ACT nasal spray 2 sprays by Each Nostril route daily 1 Bottle 1    Omega-3 Fatty Acids (FISH OIL) 1000 MG CAPS Fish Oil 340 mg-1,000 mg capsule      aspirin (ASPIR-LOW) 81 MG EC tablet Aspir-Low 81 mg tablet,delayed release   Take 1 tablet every day by oral route.  divalproex (DEPAKOTE) 500 MG DR tablet Take 500 mg by mouth 3 times daily       levETIRAcetam (KEPPRA) 1000 MG tablet Take 1,000 mg by mouth 2 times daily       metoprolol succinate (TOPROL XL) 25 MG extended release tablet Take 100 mg by mouth       vitamin B-6 (PYRIDOXINE) 100 MG tablet       venlafaxine (EFFEXOR XR) 150 MG extended release capsule venlafaxine  mg capsule,extended release 24 hr      lactulose 20 GM/30ML SOLN Take by mouth as needed      ALPRAZolam (XANAX) 0.25 MG tablet Take 0.25 mg by mouth 2 times daily as needed for Sleep.  lacosamide (VIMPAT) 50 MG TABS tablet Take 150 mg by mouth every 12 hours.  omeprazole (PRILOSEC) 10 MG delayed release capsule Take 10 mg by mouth daily      losartan (COZAAR) 100 MG tablet Take 50 mg by mouth daily       oxyCODONE-acetaminophen (PERCOCET)  MG per tablet Take 1 tablet by mouth every 4 hours as needed for Pain for up to 30 days. 180 tablet 0     No current facility-administered medications for this visit. Allergies: Allergies   Allergen Reactions    Pregabalin Other (See Comments)    Lisinopril Swelling     BRAIN SWELLING/COMA    Lipitor [Atorvastatin] Rash       Social History:    Social History     Tobacco Use    Smoking status: Former Smoker     Packs/day: 0.50     Years: 20.00     Pack years: 10.00     Types: Cigarettes     Last attempt to quit: 2015     Years since quittin.4    Smokeless tobacco: Current User   Substance Use Topics    Alcohol use: Yes     Comment: OCC    Drug use: No       Family History:   No family history on file.     Vitals:  Vitals:    20 1244   BP: (!) 146/84   Pulse: 79 Temp: 97.1 °F (36.2 °C)   TempSrc: Temporal   SpO2: 99%   Weight: 200 lb (90.7 kg)   Height: 5' 8\" (1.727 m)        Subjective   REVIEW OF SYSTEMS:   Review of Systems   Constitutional: Positive for activity change (Decreased performance status) and fatigue. Negative for chills, diaphoresis and fever. HENT: Negative. Negative for congestion, ear pain, hearing loss, nosebleeds, sore throat and tinnitus. Eyes: Negative. Negative for pain, discharge and redness. Respiratory: Negative. Negative for cough, shortness of breath and wheezing. Cardiovascular: Negative. Negative for chest pain, palpitations and leg swelling. Gastrointestinal: Negative. Negative for abdominal pain, blood in stool, constipation, diarrhea, nausea and vomiting. Endocrine: Negative for polydipsia. Genitourinary: Negative for dysuria, flank pain, frequency, hematuria and urgency. Musculoskeletal: Positive for arthralgias, gait problem and joint swelling. Negative for back pain, myalgias and neck pain. Skin: Negative. Negative for rash. Scattered ecchymosis to bilateral upper extremities   Neurological: Positive for tremors and weakness. Negative for dizziness, seizures and headaches. Hematological: Does not bruise/bleed easily. Psychiatric/Behavioral: Negative. The patient is not nervous/anxious. Difficulty with word finding at times and loss of attention       Objective   PHYSICAL EXAM:  Physical Exam  Vitals signs reviewed. Constitutional:       General: He is not in acute distress. Appearance: He is well-developed. He is ill-appearing. He is not diaphoretic. Comments: Declining status   HENT:      Head: Normocephalic and atraumatic. Mouth/Throat:      Pharynx: Uvula midline. Tonsils: No tonsillar exudate. Eyes:      General: Lids are normal. No scleral icterus. Right eye: No discharge. Left eye: No discharge.       Conjunctiva/sclera: Conjunctivae normal. Pupils: Pupils are equal, round, and reactive to light. Neck:      Musculoskeletal: Normal range of motion and neck supple. Thyroid: No thyroid mass or thyromegaly. Vascular: No JVD. Trachea: Trachea normal. No tracheal deviation. Cardiovascular:      Rate and Rhythm: Normal rate and regular rhythm. Heart sounds: Normal heart sounds. No murmur. No friction rub. No gallop. Pulmonary:      Effort: Pulmonary effort is normal. No respiratory distress. Breath sounds: Normal breath sounds. No wheezing or rales. Chest:      Chest wall: No tenderness. Abdominal:      General: Bowel sounds are normal. There is no distension. Palpations: Abdomen is soft. There is no mass. Tenderness: There is no abdominal tenderness. There is no guarding or rebound. Hernia: No hernia is present. Musculoskeletal:         General: No tenderness or deformity. Comments: Range of motion within normal limits x4 extremities  Significant left upper extremity weakness and bilateral lower extremity weakness   Skin:     General: Skin is warm and dry. Coloration: Skin is not pale. Findings: Bruising (Bilateral upper extremities) present. No erythema or rash. Neurological:      Mental Status: He is alert and oriented to person, place, and time. Cranial Nerves: No cranial nerve deficit. Motor: Weakness present. Coordination: Coordination normal.      Gait: Gait abnormal.   Psychiatric:         Speech: Speech is delayed. Behavior: Behavior normal.         Thought Content: Thought content normal.         Labs: CBC: WBC 6.86, ANC 4.06, hemoglobin 14.7, MCV 99.3 and a platelet count of 250,353    No results for input(s): WBC, HGB, PLT in the last 72 hours. CMP: No results for input(s): GLUCOSE, BUN, CREATININE, BCR, CO2, CALCIUM, ALBUMIN, LABIL2, ALKPHOS, AST, ALT in the last 72 hours.     Invalid input(s): EGFRIFNONA, EGFRIFAFRI, SODIUM, POTASSIUM, CHLORIDE, PROTENTOTREF, GLOBULIN, BILIRUBIN  Hepatic: No results for input(s): AST, ALT, ALB, BILITOT, ALKPHOS in the last 72 hours. Troponin: No results for input(s): TROPONINI in the last 72 hours. BNP: No results for input(s): BNP in the last 72 hours. Lipids: No results for input(s): CHOL, HDL in the last 72 hours. Invalid input(s): LDL  INR: No results for input(s): INR in the last 72 hours. ABG: No results for input(s): PHART, [id number], PO2ART, YLK4FTQ, G9MAXFAR, BEART in the last 72 hours. 30 Day lookback of cultures:    Blood Culture Recent: No results for input(s): BC in the last 720 hours. Gram Stain Recent: No results for input(s): LABGRAM in the last 720 hours. Resp Culture Recent: No results for input(s): CULTRESP in the last 720 hours. Body Fluid Recent : No results for input(s): BFCX in the last 720 hours. MRSA Recent : No results for input(s): [address] in the last 720 hours. Urine Culture Recent : No results for input(s): LABURIN in the last 720 hours. Organism Recent : No results for input(s): ORG in the last 720 hours. ASSESSMENT/PLAN:      1. Anaplastic glioma, unresectable MGMT un-methylated. Continues to take palliative Temodar 150 mg/m² days 1 through 5 every 28 days. Continuing to tolerate treatment with the exception of nausea that is controlled with Zofran and significant fatigue MRI of the brain on 9/8/2020 continues show positive response to treatment.      -Continue Temodar days 1 through 5 every 28 days   -Keep appointment for MRI of the brain on 12/7/2020 and follow-up with Dr. Cam Cruz on 12/8/2020  -CEA and CMP today and every visit    2. Fatigue due to treatment and disease process. Significant decline in overall performance status, grade 2-3. Having increased falls and inability to ambulate due to severe bilateral lower extremity weakness.   -Consult home health for skilled nursing and PT/OT    3.  Chronic pain of both knees, increased weakness in the left knee leading to frequent falls  -Follow-up with Dr. Leesa Mclean and orthopedic clinic as recommended  -Will request home PT/OT to evaluate and treat    4. Requires continuous supervision for activities of daily living (ADL) secondary to disease process and seizure activity.   -Seizure activity continues to be controlled with anticonvulsants managed by neurology     5. 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 Unfortunately intolerant to long-acting medications, experiences hallucinations and significant lethargy. Reports pain is overall controlled as long as he is taking his Percocet every 4 hours. -Refill provided for Percocet 10/325 mg 1 tablet every 4 hours as needed for pain    6. Side effect monitoring of chemotherapy  -Fatigue grade 2-3, has had significant decline over the past 2 to 3 months  -Nausea while taking Temodar, continue Zofran for 8 hours    7. Hypertension -/84 today with a heart rate of 79, stable. Taking losartan, metoprolol  mg daily and Lasix 20 mg daily under the direction of VLADIMIR Chung       FOLLOW UP:  1. Follow-up appointment given for 4 weeks, sooner if needed  2. Continue to follow echo providers as recommended    Over 50% of the total visit time of 25 minutes in face to face encounter with the patient, out of which more than 50% of the time was spent in counseling patient  and coordination of care. Counseling included but was not limited to time spent reviewing labs, imaging studies/ treatment plan and answering questions. This does not include charting. Discussed precautions related to [address] and being at increased risk. Discussed proper handwashing to be done frequently, limit exposure to other individuals and maintain social distancing of 6 feet. Recommend contacting primary care provider if having respiratory symptoms for further recommendations and consideration for testing.     (Please note that portions of this note were completed with a voice recognition program. Efforts were made to edit the dictations but occasionally words are mis-transcribed.)      Electronically signed by VLADIMIR Lomas on 12/10/2020 at 2:28 PM

## 2020-12-08 NOTE — PATIENT INSTRUCTIONS
"PATIENT TO CONTINUE TO FOLLOW UP WITH HIS PRIMARY CARE PROVIDER FOR YEARLY PHYSICAL EXAMS TO ENSURE COMPLETE HEALTH MAINTENANCE        BMI for Adults  What is BMI?  Body mass index (BMI) is a number that is calculated from a person's weight and height. BMI can help estimate how much of a person's weight is composed of fat. BMI does not measure body fat directly. Rather, it is an alternative to procedures that directly measure body fat, which can be difficult and expensive.  BMI can help identify people who may be at higher risk for certain medical problems.  What are BMI measurements used for?  BMI is used as a screening tool to identify possible weight problems. It helps determine whether a person is obese, overweight, a healthy weight, or underweight.  BMI is useful for:  · Identifying a weight problem that may be related to a medical condition or may increase the risk for medical problems.  · Promoting changes, such as changes in diet and exercise, to help reach a healthy weight. BMI screening can be repeated to see if these changes are working.  How is BMI calculated?  BMI involves measuring your weight in relation to your height. Both height and weight are measured, and the BMI is calculated from those numbers. This can be done either in English ([place] or metric measurements. Note that charts and online BMI calculators are available to help you find your BMI quickly and easily without having to do these calculations yourself.  To calculate your BMI in English ([place] measurements:    1. Measure your weight in pounds (lb).  2. Multiply the number of pounds by 703.  ? For example, for a person who weighs 180 lb, multiply that number by 703, which equals 126,540.  3. Measure your height in inches. Then multiply that number by itself to get a measurement called \"inches squared.\"  ? For example, for a person who is 70 inches tall, the \"inches squared\" measurement is 70 inches x 70 inches, which equals 4,900 inches " "squared.  4. Divide the total from step 2 (number of lb x 703) by the total from step 3 (inches squared): 126,540 ÷ 4,900 = 25.8. This is your BMI.  To calculate your BMI in metric measurements:  1. Measure your weight in kilograms (kg).  2. Measure your height in meters (m). Then multiply that number by itself to get a measurement called \"meters squared.\"  ? For example, for a person who is 1.75 m tall, the \"meters squared\" measurement is 1.75 m x 1.75 m, which is equal to 3.1 meters squared.  3. Divide the number of kilograms (your weight) by the meters squared number. In this example: 70 ÷ 3.1 = 22.6. This is your BMI.  What do the results mean?  BMI charts are used to identify whether you are underweight, normal weight, overweight, or obese. The following guidelines will be used:  · Underweight: BMI less than 18.5.  · Normal weight: BMI between 18.5 and 24.9.  · Overweight: BMI between 25 and 29.9.  · Obese: BMI of 30 or above.  Keep these notes in mind:  · Weight includes both fat and muscle, so someone with a muscular build, such as an athlete, may have a BMI that is higher than 24.9. In cases like these, BMI is not an accurate measure of body fat.  · To determine if excess body fat is the cause of a BMI of 25 or higher, further assessments may need to be done by a health care provider.  · BMI is usually interpreted in the same way for men and women.  Where to find more information  For more information about BMI, including tools to quickly calculate your BMI, go to these websites:  · Centers for Disease Control and Prevention: www.cdc.gov  · American Heart Association: www.heart.org  · National Heart, Lung, and Blood [place]: www.nhlbi.nih.gov  Summary  · Body mass index (BMI) is a number that is calculated from a person's weight and height.  · BMI may help estimate how much of a person's weight is composed of fat. BMI can help identify those who may be at higher risk for certain medical problems.  · BMI " "can be measured using English measurements or metric measurements.  · BMI charts are used to identify whether you are underweight, normal weight, overweight, or obese.  This information is not intended to replace advice given to you by your health care provider. Make sure you discuss any questions you have with your health care provider.  Document Revised: 09/09/2020 Document Reviewed: 07/17/2020  [employer] Patient Education © 2020 [employer]      DASH Eating Plan  DASH stands for \"Dietary Approaches to Stop Hypertension.\" The DASH eating plan is a healthy eating plan that has been shown to reduce high blood pressure (hypertension). It may also reduce your risk for type 2 diabetes, heart disease, and stroke. The DASH eating plan may also help with weight loss.  What are tips for following this plan?    General guidelines  · Avoid eating more than 2,300 mg (milligrams) of salt (sodium) a day. If you have hypertension, you may need to reduce your sodium intake to 1,500 mg a day.  · Limit alcohol intake to no more than 1 drink a day for nonpregnant women and 2 drinks a day for men. One drink equals 12 oz of beer, 5 oz of wine, or 1½ oz of hard liquor.  · Work with your health care provider to maintain a healthy body weight or to lose weight. Ask what an ideal weight is for you.  · Get at least 30 minutes of exercise that causes your heart to beat faster (aerobic exercise) most days of the week. Activities may include walking, swimming, or biking.  · Work with your health care provider or diet and nutrition specialist (dietitian) to adjust your eating plan to your individual calorie needs.  Reading food labels    · Check food labels for the amount of sodium per serving. Choose foods with less than 5 percent of the Daily Value of sodium. Generally, foods with less than 300 mg of sodium per serving fit into this eating plan.  · To find whole grains, look for the word \"whole\" as the first word in the ingredient " "list.  Shopping  · Buy products labeled as \"low-sodium\" or \"no salt added.\"  · Buy fresh foods. Avoid canned foods and premade or frozen meals.  Cooking  · Avoid adding salt when cooking. Use salt-free seasonings or herbs instead of table salt or sea salt. Check with your health care provider or pharmacist before using salt substitutes.  · Do not larkin foods. Cook foods using healthy methods such as baking, boiling, grilling, and broiling instead.  · Cook with heart-healthy oils, such as olive, canola, soybean, or sunflower oil.  Meal planning  · Eat a balanced diet that includes:  ? 5 or more servings of fruits and vegetables each day. At each meal, try to fill half of your plate with fruits and vegetables.  ? Up to 6-8 servings of whole grains each day.  ? Less than 6 oz of lean meat, poultry, or fish each day. A 3-oz serving of meat is about the same size as a deck of cards. One egg equals 1 oz.  ? 2 servings of low-fat dairy each day.  ? A serving of nuts, seeds, or beans 5 times each week.  ? Heart-healthy fats. Healthy fats called Omega-3 fatty acids are found in foods such as flaxseeds and coldwater fish, like sardines, salmon, and mackerel.  · Limit how much you eat of the following:  ? Canned or prepackaged foods.  ? Food that is high in trans fat, such as fried foods.  ? Food that is high in saturated fat, such as fatty meat.  ? Sweets, desserts, sugary drinks, and other foods with added sugar.  ? Full-fat dairy products.  · Do not salt foods before eating.  · Try to eat at least 2 vegetarian meals each week.  · Eat more home-cooked food and less restaurant, buffet, and fast food.  · When eating at a restaurant, ask that your food be prepared with less salt or no salt, if possible.  What foods are recommended?  The items listed may not be a complete list. Talk with your dietitian about what dietary choices are best for you.  Grains  Whole-grain or whole-wheat bread. Whole-grain or whole-wheat pasta. Brown " rice. Oatmeal. Quinoa. Bulgur. Whole-grain and low-sodium cereals. Apurva bread. Low-fat, low-sodium crackers. Whole-wheat flour tortillas.  Vegetables  Fresh or frozen vegetables (raw, steamed, roasted, or grilled). Low-sodium or reduced-sodium tomato and vegetable juice. Low-sodium or reduced-sodium tomato sauce and tomato paste. Low-sodium or reduced-sodium canned vegetables.  Fruits  All fresh, dried, or frozen fruit. Canned fruit in natural juice (without added sugar).  Meat and other protein foods  Skinless chicken or turkey. Ground chicken or turkey. Pork with fat trimmed off. Fish and seafood. Egg whites. Dried beans, peas, or lentils. Unsalted nuts, nut butters, and seeds. Unsalted canned beans. Lean cuts of beef with fat trimmed off. Low-sodium, lean deli meat.  Dairy  Low-fat (1%) or fat-free (skim) milk. Fat-free, low-fat, or reduced-fat cheeses. Nonfat, low-sodium ricotta or cottage cheese. Low-fat or nonfat yogurt. Low-fat, low-sodium cheese.  Fats and oils  Soft margarine without trans fats. Vegetable oil. Low-fat, reduced-fat, or light mayonnaise and salad dressings (reduced-sodium). Canola, safflower, olive, soybean, and sunflower oils. Avocado.  Seasoning and other foods  Herbs. Spices. Seasoning mixes without salt. Unsalted popcorn and pretzels. Fat-free sweets.  What foods are not recommended?  The items listed may not be a complete list. Talk with your dietitian about what dietary choices are best for you.  Grains  Baked goods made with fat, such as croissants, muffins, or some breads. Dry pasta or rice meal packs.  Vegetables  Creamed or fried vegetables. Vegetables in a cheese sauce. Regular canned vegetables (not low-sodium or reduced-sodium). Regular canned tomato sauce and paste (not low-sodium or reduced-sodium). Regular tomato and vegetable juice (not low-sodium or reduced-sodium). Pickles. Olives.  Fruits  Canned fruit in a light or heavy syrup. Fried fruit. Fruit in cream or butter  sauce.  Meat and other protein foods  Fatty cuts of meat. Ribs. Fried meat. Mills. Sausage. Bologna and other processed lunch meats. Salami. Fatback. Hotdogs. Bratwurst. Salted nuts and seeds. Canned beans with added salt. Canned or smoked fish. Whole eggs or egg yolks. Chicken or turkey with skin.  Dairy  Whole or 2% milk, cream, and half-and-half. Whole or full-fat cream cheese. Whole-fat or sweetened yogurt. Full-fat cheese. Nondairy creamers. Whipped toppings. Processed cheese and cheese spreads.  Fats and oils  Butter. Stick margarine. Lard. Shortening. Ghee. Mills fat. Tropical oils, such as coconut, palm kernel, or palm oil.  Seasoning and other foods  Salted popcorn and pretzels. Onion salt, garlic salt, seasoned salt, table salt, and sea salt. Worcestershire sauce. Tartar sauce. Barbecue sauce. Teriyaki sauce. Soy sauce, including reduced-sodium. Steak sauce. Canned and packaged gravies. Fish sauce. Oyster sauce. Cocktail sauce. Horseradish that you find on the shelf. Ketchup. Mustard. Meat flavorings and tenderizers. Bouillon cubes. Hot sauce and Tabasco sauce. Premade or packaged marinades. Premade or packaged taco seasonings. Relishes. Regular salad dressings.  Where to find more information:  · National Heart, Lung, and Blood [place]: www.nhlbi.nih.gov  · American Heart Association: www.heart.org  Summary  · The DASH eating plan is a healthy eating plan that has been shown to reduce high blood pressure (hypertension). It may also reduce your risk for type 2 diabetes, heart disease, and stroke.  · With the DASH eating plan, you should limit salt (sodium) intake to 2,300 mg a day. If you have hypertension, you may need to reduce your sodium intake to 1,500 mg a day.  · When on the DASH eating plan, aim to eat more fresh fruits and vegetables, whole grains, lean proteins, low-fat dairy, and heart-healthy fats.  · Work with your health care provider or diet and nutrition specialist (dietitian) to adjust  your eating plan to your individual calorie needs.  This information is not intended to replace advice given to you by your health care provider. Make sure you discuss any questions you have with your health care provider.  Document Revised: 11/30/2018 Document Reviewed: 12/11/2017  [employer] Patient Education © 2020 [employer]vier Inc.

## 2020-12-08 NOTE — PROGRESS NOTES
SUBJECTIVE:  Patient ID: Lukasz Savage is a 55 y.o. male is here today for follow-up.    Chief Complaint:astrocytoma  Chief Complaint   Patient presents with   • Brain Tumor     patient is here for a 3 month follow up with MRI @ [facility] 12/7/2020.       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  He has had IMRT and Temodar.  He has had several issues with difficult to control seizures and is followed by our neurology department.    The following portions of the patient's history were reviewed and updated as appropriate: allergies, current medications, past family history, past medical history, past social history, past surgical history and problem list.    OBJECTIVE:    Review of Systems   Musculoskeletal: Positive for gait problem.   Neurological: Positive for weakness.   All other systems reviewed and are negative.         Physical Exam  Eyes:      Extraocular Movements: EOM normal.      Pupils: Pupils are equal, round, and reactive to light.   Neurological:      Mental Status: He is oriented to person, place, and time.      Coordination: Finger-Nose-Finger Test normal.      Deep Tendon Reflexes: Strength normal.   Psychiatric:         Speech: Speech normal.         Neurologic Exam     Mental Status   Oriented to person, place, and time.   Attention: normal.   Speech: speech is normal   Level of consciousness: alert  Knowledge: good.   Flat affect     Cranial Nerves     CN II   Visual fields full to confrontation.     CN III, IV, VI   Pupils are equal, round, and reactive to light.  Extraocular motions are normal.     CN V   Facial sensation intact.     CN VII   Facial expression full, symmetric.     CN VIII   CN VIII normal.     CN IX, X   CN IX normal.   CN X normal.     CN XI   CN XI normal.     CN XII   CN XII normal.     Motor Exam   Muscle bulk: normal  Overall muscle tone:  normal  Right arm pronator drift: absent  Left arm pronator drift: absent    Strength   Strength 5/5 throughout. Left upper extremity weakness 3 out of 5    Left lower extremity weakness proximal greater than distal.     Sensory Exam   Light touch normal.   Pinprick normal.     Gait, Coordination, and Reflexes     Gait  Gait: (Unsteady gait has trouble flexing left hip and advancing for ambulation.)    Coordination   Finger to nose coordination: normal    Tremor   Resting tremor: absent  Intention tremor: absent  Action tremor: absent    Reflexes   Reflexes 2+ except as noted.       Independent Review of Radiographic Studies:   MRI of the brain without contrast is stable compared to previous imaging.  There is right-sided atrophy.    ASSESSMENT/PLAN:  The patient's MRI does not show any obvious enhancing progression of disease however the patient is struggling neurologically and slowly over time progressively has more trouble with left-sided function.  I think this is a combination of microscopic progression of disease not evident on the MRI and long-term effect of IMRT radiation.  We discussed this and explained it to him and his mother extensively.  They are interested in inpatient rehab placement if that is possible.  He has home health ordered currently.  We will continue with home health and look into inpatient acute rehab placement.  Otherwise we will see him in follow-up in about 3 months with a repeat MRI.      1. Astrocytoma brain tumor (CMS/HCC)    2. Non-smoker        The patient's Body mass index is 30.41 kg/m².. BMI is above normal parameters. Recommendations include: educational material    No follow-ups on file.      Dillon Trevino MD

## 2020-12-10 ENCOUNTER — CLINICAL DOCUMENTATION (OUTPATIENT)
Dept: INFUSION THERAPY | Age: 55
End: 2020-12-10

## 2020-12-10 ASSESSMENT — ENCOUNTER SYMPTOMS
GASTROINTESTINAL NEGATIVE: 1
BACK PAIN: 0
NAUSEA: 0

## 2020-12-17 ENCOUNTER — TELEPHONE (OUTPATIENT)
Dept: INFUSION THERAPY | Age: 55
End: 2020-12-17

## 2020-12-17 NOTE — TELEPHONE ENCOUNTER
Elyssa has been called with the new order and she will be advising the patient of the new medication

## 2020-12-17 NOTE — TELEPHONE ENCOUNTER
Dr. zavala said to add norvasc 5 mg daily, home health to continue to monitor bp and if continues to be elevated call back for further medication adjustments

## 2020-12-17 NOTE — TELEPHONE ENCOUNTER
Voicemail received from Juana Wong OT with [facility] stating that when they arrived at the patient's home his BP was 188/110. Patient had PRN Clonidine but the medication had  in May. Darinel stated that she had notified his PCP and was waiting for a return call. Attempted to contact Juana Wong but no answer. Nurse spoke with patient's mother who stated that his PCP had called in new BP meds and she was going to get them. Advised mother to call if anymore questions or concerns and she voiced understanding.

## 2020-12-17 NOTE — TELEPHONE ENCOUNTER
Patient has had home health ordered by Oncology Dr Doyle they have been out to see patient they are reporting a blood pressure being high after several attempts they will be reporting this to Dr Doyle office as well    Patient has no complaints    Right Arm 168/102 this was a resting BP    Left Arm 188/110 this was a resting BP

## 2020-12-28 PROBLEM — Z74.1 REQUIRES ASSISTANCE WITH ACTIVITIES OF DAILY LIVING (ADL): Status: ACTIVE | Noted: 2020-01-01

## 2020-12-28 PROBLEM — G40.909 SEIZURE DISORDER (HCC): Status: ACTIVE | Noted: 2017-07-04

## 2020-12-28 PROBLEM — C71.9: Chronic | Status: RESOLVED | Noted: 2019-07-02 | Resolved: 2020-01-01

## 2020-12-28 PROBLEM — R56.9 SEIZURES (HCC): Status: RESOLVED | Noted: 2017-07-04 | Resolved: 2020-01-01

## 2020-12-28 NOTE — TELEPHONE ENCOUNTER
We didn't discuss referring him to physical therapy b/c she said it was too hard to get him in and out of a vehicle to do therapy.  We did discuss having him admitted to a rehab facility ([facility] rehab, [facility], etc) for extensive rehab but the mother was suppose to let me know after the holidays if that is still something they were interested in.    I was aware of the patient being in the ER today as I follow along the ER admission.    raine quarles CMA

## 2020-12-28 NOTE — TELEPHONE ENCOUNTER
Provider: ROBERTO  Caller: MARLIN WAHL  Relationship to Patient: MOTHER  Pharmacy: [facility]  Phone Number: [phone]  Reason for Call: THE PATIENT'S MOTHER CALLED BECAUSE THE PATIENT WAS TAKEN TO THE ER TODAY AS ADVISED BY HOME HEALTH. WHEN THE PATIENT LAST SAW DR MEJIA, HE WAS TOLD THAT HE WAS GOING TO BE REFERRED TO PHYSICAL THERAPY. MARLIN WAS CALLING TO LET THE OFFICE KNOW THAT THE PATIENT IS CURRENTLY IN THE ER, AS SHE WASN'T SURE IF THAT WOULD CHANGE THE PLANS TO REFER THE PATIENT.   When was the patient last seen: 12/8/20

## 2020-12-30 PROBLEM — E44.0 MODERATE MALNUTRITION (HCC): Status: ACTIVE | Noted: 2020-01-01

## 2021-01-01 ENCOUNTER — TELEPHONE (OUTPATIENT)
Dept: NEUROSURGERY | Facility: CLINIC | Age: 56
End: 2021-01-01

## 2021-01-01 ENCOUNTER — READMISSION MANAGEMENT (OUTPATIENT)
Dept: CALL CENTER | Facility: HOSPITAL | Age: 56
End: 2021-01-01

## 2021-01-01 ENCOUNTER — TELEPHONE (OUTPATIENT)
Dept: FAMILY MEDICINE CLINIC | Facility: CLINIC | Age: 56
End: 2021-01-01

## 2021-01-01 ENCOUNTER — HOSPITAL ENCOUNTER (OUTPATIENT)
Dept: NEUROLOGY | Facility: HOSPITAL | Age: 56
Discharge: HOME | End: 2021-01-15
Admitting: PHYSICIAN ASSISTANT

## 2021-01-01 ENCOUNTER — TELEPHONE (OUTPATIENT)
Dept: NEUROLOGY | Facility: CLINIC | Age: 56
End: 2021-01-01

## 2021-01-01 ENCOUNTER — APPOINTMENT (OUTPATIENT)
Dept: MRI IMAGING | Facility: HOSPITAL | Age: 56
End: 2021-01-01

## 2021-01-01 ENCOUNTER — APPOINTMENT (OUTPATIENT)
Dept: CT IMAGING | Facility: HOSPITAL | Age: 56
End: 2021-01-01

## 2021-01-01 ENCOUNTER — APPOINTMENT (OUTPATIENT)
Dept: GENERAL RADIOLOGY | Facility: HOSPITAL | Age: 56
End: 2021-01-01

## 2021-01-01 ENCOUNTER — TRANSITIONAL CARE MANAGEMENT TELEPHONE ENCOUNTER (OUTPATIENT)
Dept: CALL CENTER | Facility: HOSPITAL | Age: 56
End: 2021-01-01

## 2021-01-01 ENCOUNTER — HOSPITAL ENCOUNTER (INPATIENT)
Facility: HOSPITAL | Age: 56
LOS: 2 days | Discharge: HOSPICE | End: 2021-02-26
Attending: FAMILY MEDICINE | Admitting: FAMILY MEDICINE

## 2021-01-01 ENCOUNTER — TRANSCRIBE ORDERS (OUTPATIENT)
Dept: ADMINISTRATIVE | Facility: HOSPITAL | Age: 56
End: 2021-01-01

## 2021-01-01 ENCOUNTER — HOSPITAL ENCOUNTER (OUTPATIENT)
Dept: MRI IMAGING | Facility: HOSPITAL | Age: 56
Discharge: HOME | End: 2021-02-24
Admitting: NURSE PRACTITIONER

## 2021-01-01 ENCOUNTER — OFFICE VISIT (OUTPATIENT)
Dept: FAMILY MEDICINE CLINIC | Facility: CLINIC | Age: 56
End: 2021-01-01

## 2021-01-01 ENCOUNTER — HOSPITAL ENCOUNTER (EMERGENCY)
Facility: HOSPITAL | Age: 56
Discharge: NURSING, REHAB OR LONG-TERM CARE | End: 2021-01-18
Admitting: INTERNAL MEDICINE

## 2021-01-01 VITALS
TEMPERATURE: 97.8 F | DIASTOLIC BLOOD PRESSURE: 89 MMHG | BODY MASS INDEX: 25.55 KG/M2 | SYSTOLIC BLOOD PRESSURE: 129 MMHG | OXYGEN SATURATION: 92 % | HEIGHT: 68 IN | WEIGHT: 168.6 LBS | RESPIRATION RATE: 18 BRPM | HEART RATE: 63 BPM

## 2021-01-01 VITALS
RESPIRATION RATE: 18 BRPM | DIASTOLIC BLOOD PRESSURE: 74 MMHG | OXYGEN SATURATION: 96 % | SYSTOLIC BLOOD PRESSURE: 126 MMHG | BODY MASS INDEX: 27.02 KG/M2 | TEMPERATURE: 99 F | WEIGHT: 182.4 LBS | HEART RATE: 97 BPM | HEIGHT: 69 IN

## 2021-01-01 VITALS — HEART RATE: 62 BPM | SYSTOLIC BLOOD PRESSURE: 154 MMHG | OXYGEN SATURATION: 96 % | DIASTOLIC BLOOD PRESSURE: 101 MMHG

## 2021-01-01 DIAGNOSIS — C71.9 ANAPLASTIC GLIOMA OF BRAIN (HCC): Chronic | ICD-10-CM

## 2021-01-01 DIAGNOSIS — S22.43XA CLOSED FRACTURE OF MULTIPLE RIBS OF BOTH SIDES, INITIAL ENCOUNTER: Primary | ICD-10-CM

## 2021-01-01 DIAGNOSIS — G40.909 SEIZURE DISORDER (HCC): Primary | ICD-10-CM
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
## 2021-01-01 DIAGNOSIS — C71.9 ANAPLASTIC GLIOMA OF BRAIN (HCC): Primary | ICD-10-CM

## 2021-01-01 DIAGNOSIS — K59.00 CONSTIPATION, UNSPECIFIED CONSTIPATION TYPE: ICD-10-CM

## 2021-01-01 DIAGNOSIS — I10 ESSENTIAL HYPERTENSION: ICD-10-CM

## 2021-01-01 DIAGNOSIS — G89.3 CANCER ASSOCIATED PAIN: ICD-10-CM

## 2021-01-01 DIAGNOSIS — E44.0 MODERATE MALNUTRITION (HCC): ICD-10-CM

## 2021-01-01 DIAGNOSIS — C71.9 ANAPLASTIC GLIOMA OF BRAIN (HCC): ICD-10-CM

## 2021-01-01 DIAGNOSIS — C71.9 ASTROCYTOMA BRAIN TUMOR (HCC): Primary | ICD-10-CM

## 2021-01-01 DIAGNOSIS — G40.909 SEIZURE DISORDER (HCC): ICD-10-CM

## 2021-01-01 DIAGNOSIS — C71.9 ANAPLASTIC GLIOMA OF BRAIN (HCC): Primary | Chronic | ICD-10-CM

## 2021-01-01 DIAGNOSIS — K42.9 UMBILICAL HERNIA WITHOUT OBSTRUCTION AND WITHOUT GANGRENE: ICD-10-CM

## 2021-01-01 DIAGNOSIS — R53.1 WEAKNESS: ICD-10-CM

## 2021-01-01 DIAGNOSIS — R41.82 ALTERED MENTAL STATUS, UNSPECIFIED ALTERED MENTAL STATUS TYPE: ICD-10-CM

## 2021-01-01 DIAGNOSIS — R53.1 WEAKNESS GENERALIZED: ICD-10-CM

## 2021-01-01 DIAGNOSIS — R41.0 CONFUSION: ICD-10-CM

## 2021-01-01 DIAGNOSIS — R13.10 DYSPHAGIA, UNSPECIFIED TYPE: ICD-10-CM

## 2021-01-01 DIAGNOSIS — Z51.5 HOSPICE CARE: ICD-10-CM

## 2021-01-01 DIAGNOSIS — Z74.3 REQUIRES CONTINUOUS SUPERVISION FOR ACTIVITIES OF DAILY LIVING (ADL): ICD-10-CM

## 2021-01-01 LAB
ALBUMIN SERPL-MCNC: 2.5 G/DL (ref 3.5–5.2)
ALBUMIN SERPL-MCNC: 2.9 G/DL (ref 3.5–5.2)
ALBUMIN SERPL-MCNC: 3.3 G/DL (ref 3.5–5.2)
ALBUMIN/GLOB SERPL: 1.2 G/DL
ALBUMIN/GLOB SERPL: 1.4 G/DL
ALBUMIN/GLOB SERPL: 1.4 G/DL
ALP SERPL-CCNC: 40 U/L (ref 39–117)
ALP SERPL-CCNC: 43 U/L (ref 39–117)
ALP SERPL-CCNC: 47 U/L (ref 39–117)
ALT SERPL W P-5'-P-CCNC: 41 U/L (ref 1–41)
ALT SERPL W P-5'-P-CCNC: 8 U/L (ref 1–41)
ALT SERPL W P-5'-P-CCNC: 9 U/L (ref 1–41)
ANION GAP SERPL CALCULATED.3IONS-SCNC: 11 MMOL/L (ref 5–15)
ANION GAP SERPL CALCULATED.3IONS-SCNC: 5 MMOL/L (ref 5–15)
ANION GAP SERPL CALCULATED.3IONS-SCNC: 9 MMOL/L (ref 5–15)
APTT PPP: 29.2 SECONDS (ref 24.1–35)
AST SERPL-CCNC: 11 U/L (ref 1–40)
AST SERPL-CCNC: 13 U/L (ref 1–40)
AST SERPL-CCNC: 36 U/L (ref 1–40)
BACTERIA SPEC AEROBE CULT: NORMAL
BACTERIA SPEC AEROBE CULT: NORMAL
BACTERIA UR QL AUTO: ABNORMAL /HPF
BASOPHILS # BLD AUTO: 0.02 10*3/MM3 (ref 0–0.2)
BASOPHILS # BLD AUTO: 0.02 10*3/MM3 (ref 0–0.2)
BASOPHILS NFR BLD AUTO: 0.2 % (ref 0–1.5)
BASOPHILS NFR BLD AUTO: 0.2 % (ref 0–1.5)
BILIRUB SERPL-MCNC: 0.4 MG/DL (ref 0–1.2)
BILIRUB SERPL-MCNC: 0.4 MG/DL (ref 0–1.2)
BILIRUB SERPL-MCNC: 0.5 MG/DL (ref 0–1.2)
BILIRUB UR QL STRIP: NEGATIVE
BILIRUB UR QL STRIP: NEGATIVE
BUN SERPL-MCNC: 14 MG/DL (ref 6–20)
BUN SERPL-MCNC: 18 MG/DL (ref 6–20)
BUN SERPL-MCNC: 20 MG/DL (ref 6–20)
BUN/CREAT SERPL: 16.7 (ref 7–25)
BUN/CREAT SERPL: 28.6 (ref 7–25)
BUN/CREAT SERPL: 30 (ref 7–25)
CALCIUM SPEC-SCNC: 7.9 MG/DL (ref 8.6–10.5)
CALCIUM SPEC-SCNC: 8.7 MG/DL (ref 8.6–10.5)
CALCIUM SPEC-SCNC: 8.8 MG/DL (ref 8.6–10.5)
CHLORIDE SERPL-SCNC: 103 MMOL/L (ref 98–107)
CHLORIDE SERPL-SCNC: 106 MMOL/L (ref 98–107)
CHLORIDE SERPL-SCNC: 112 MMOL/L (ref 98–107)
CLARITY UR: CLEAR
CLARITY UR: CLEAR
CO2 SERPL-SCNC: 24 MMOL/L (ref 22–29)
CO2 SERPL-SCNC: 25 MMOL/L (ref 22–29)
CO2 SERPL-SCNC: 25 MMOL/L (ref 22–29)
COLOR UR: ABNORMAL
COLOR UR: YELLOW
CREAT BLDA-MCNC: 1 MG/DL (ref 0.6–1.3)
CREAT SERPL-MCNC: 0.6 MG/DL (ref 0.76–1.27)
CREAT SERPL-MCNC: 0.7 MG/DL (ref 0.76–1.27)
CREAT SERPL-MCNC: 0.84 MG/DL (ref 0.76–1.27)
D-LACTATE SERPL-SCNC: 1.7 MMOL/L (ref 0.5–2)
DEPRECATED RDW RBC AUTO: 48.9 FL (ref 37–54)
DEPRECATED RDW RBC AUTO: 55.3 FL (ref 37–54)
DEPRECATED RDW RBC AUTO: 56.5 FL (ref 37–54)
EOSINOPHIL # BLD AUTO: 0 10*3/MM3 (ref 0–0.4)
EOSINOPHIL # BLD AUTO: 0 10*3/MM3 (ref 0–0.4)
EOSINOPHIL # BLD MANUAL: 0.07 10*3/MM3 (ref 0–0.4)
EOSINOPHIL NFR BLD AUTO: 0 % (ref 0.3–6.2)
EOSINOPHIL NFR BLD AUTO: 0 % (ref 0.3–6.2)
EOSINOPHIL NFR BLD MANUAL: 1 % (ref 0.3–6.2)
ERYTHROCYTE [DISTWIDTH] IN BLOOD BY AUTOMATED COUNT: 14.6 % (ref 12.3–15.4)
ERYTHROCYTE [DISTWIDTH] IN BLOOD BY AUTOMATED COUNT: 16.3 % (ref 12.3–15.4)
ERYTHROCYTE [DISTWIDTH] IN BLOOD BY AUTOMATED COUNT: 16.5 % (ref 12.3–15.4)
GFR SERPL CREATININE-BSD FRML MDRD: 117 ML/MIN/1.73
GFR SERPL CREATININE-BSD FRML MDRD: 140 ML/MIN/1.73
GFR SERPL CREATININE-BSD FRML MDRD: 95 ML/MIN/1.73
GLOBULIN UR ELPH-MCNC: 1.8 GM/DL
GLOBULIN UR ELPH-MCNC: 2.1 GM/DL
GLOBULIN UR ELPH-MCNC: 2.7 GM/DL
GLUCOSE SERPL-MCNC: 69 MG/DL (ref 65–99)
GLUCOSE SERPL-MCNC: 71 MG/DL (ref 65–99)
GLUCOSE SERPL-MCNC: 98 MG/DL (ref 65–99)
GLUCOSE UR STRIP-MCNC: NEGATIVE MG/DL
GLUCOSE UR STRIP-MCNC: NEGATIVE MG/DL
HCT VFR BLD AUTO: 33.9 % (ref 37.5–51)
HCT VFR BLD AUTO: 36.5 % (ref 37.5–51)
HCT VFR BLD AUTO: 39.7 % (ref 37.5–51)
HGB BLD-MCNC: 11.9 G/DL (ref 13–17.7)
HGB BLD-MCNC: 12.5 G/DL (ref 13–17.7)
HGB BLD-MCNC: 13.5 G/DL (ref 13–17.7)
HGB UR QL STRIP.AUTO: NEGATIVE
HGB UR QL STRIP.AUTO: NEGATIVE
HOLD SPECIMEN: NORMAL
HOLD SPECIMEN: NORMAL
HYALINE CASTS UR QL AUTO: ABNORMAL /LPF
IMM GRANULOCYTES # BLD AUTO: 0.15 10*3/MM3 (ref 0–0.05)
IMM GRANULOCYTES # BLD AUTO: 0.16 10*3/MM3 (ref 0–0.05)
IMM GRANULOCYTES NFR BLD AUTO: 1.1 % (ref 0–0.5)
IMM GRANULOCYTES NFR BLD AUTO: 1.4 % (ref 0–0.5)
INR PPP: 1.22 (ref 0.91–1.09)
KETONES UR QL STRIP: NEGATIVE
KETONES UR QL STRIP: NEGATIVE
LEUKOCYTE ESTERASE UR QL STRIP.AUTO: ABNORMAL
LEUKOCYTE ESTERASE UR QL STRIP.AUTO: NEGATIVE
LIPASE SERPL-CCNC: 15 U/L (ref 13–60)
LYMPHOCYTES # BLD AUTO: 1.82 10*3/MM3 (ref 0.7–3.1)
LYMPHOCYTES # BLD AUTO: 2.05 10*3/MM3 (ref 0.7–3.1)
LYMPHOCYTES # BLD MANUAL: 0.61 10*3/MM3 (ref 0.7–3.1)
LYMPHOCYTES NFR BLD AUTO: 13.9 % (ref 19.6–45.3)
LYMPHOCYTES NFR BLD AUTO: 18.3 % (ref 19.6–45.3)
LYMPHOCYTES NFR BLD MANUAL: 17.5 % (ref 5–12)
LYMPHOCYTES NFR BLD MANUAL: 8.2 % (ref 19.6–45.3)
MCH RBC QN AUTO: 32 PG (ref 26.6–33)
MCH RBC QN AUTO: 32.1 PG (ref 26.6–33)
MCH RBC QN AUTO: 32.2 PG (ref 26.6–33)
MCHC RBC AUTO-ENTMCNC: 34 G/DL (ref 31.5–35.7)
MCHC RBC AUTO-ENTMCNC: 34.2 G/DL (ref 31.5–35.7)
MCHC RBC AUTO-ENTMCNC: 35.1 G/DL (ref 31.5–35.7)
MCV RBC AUTO: 91.9 FL (ref 79–97)
MCV RBC AUTO: 93.6 FL (ref 79–97)
MCV RBC AUTO: 94.1 FL (ref 79–97)
MONOCYTES # BLD AUTO: 1.09 10*3/MM3 (ref 0.1–0.9)
MONOCYTES # BLD AUTO: 1.14 10*3/MM3 (ref 0.1–0.9)
MONOCYTES # BLD AUTO: 1.3 10*3/MM3 (ref 0.1–0.9)
MONOCYTES NFR BLD AUTO: 10.2 % (ref 5–12)
MONOCYTES NFR BLD AUTO: 8.3 % (ref 5–12)
NEUTROPHILS # BLD AUTO: 4.97 10*3/MM3 (ref 1.7–7)
NEUTROPHILS NFR BLD AUTO: 10.04 10*3/MM3 (ref 1.7–7)
NEUTROPHILS NFR BLD AUTO: 69.9 % (ref 42.7–76)
NEUTROPHILS NFR BLD AUTO: 7.86 10*3/MM3 (ref 1.7–7)
NEUTROPHILS NFR BLD AUTO: 76.5 % (ref 42.7–76)
NEUTROPHILS NFR BLD MANUAL: 45.4 % (ref 42.7–76)
NEUTS BAND NFR BLD MANUAL: 21.6 % (ref 0–5)
NITRITE UR QL STRIP: NEGATIVE
NITRITE UR QL STRIP: NEGATIVE
NRBC BLD AUTO-RTO: 0 /100 WBC (ref 0–0.2)
NRBC BLD AUTO-RTO: 0 /100 WBC (ref 0–0.2)
PH UR STRIP.AUTO: 6 [PH] (ref 5–8)
PH UR STRIP.AUTO: 6 [PH] (ref 5–8)
PLATELET # BLD AUTO: 102 10*3/MM3 (ref 140–450)
PLATELET # BLD AUTO: 103 10*3/MM3 (ref 140–450)
PLATELET # BLD AUTO: 122 10*3/MM3 (ref 140–450)
PMV BLD AUTO: 10.3 FL (ref 6–12)
PMV BLD AUTO: 11.1 FL (ref 6–12)
PMV BLD AUTO: 11.9 FL (ref 6–12)
POTASSIUM SERPL-SCNC: 3.8 MMOL/L (ref 3.5–5.2)
POTASSIUM SERPL-SCNC: 4 MMOL/L (ref 3.5–5.2)
POTASSIUM SERPL-SCNC: 4.3 MMOL/L (ref 3.5–5.2)
PROCALCITONIN SERPL-MCNC: 0.07 NG/ML (ref 0–0.25)
PROT SERPL-MCNC: 4.3 G/DL (ref 6–8.5)
PROT SERPL-MCNC: 5 G/DL (ref 6–8.5)
PROT SERPL-MCNC: 6 G/DL (ref 6–8.5)
PROT UR QL STRIP: ABNORMAL
PROT UR QL STRIP: NEGATIVE
PROTHROMBIN TIME: 15 SECONDS (ref 11.9–14.6)
QT INTERVAL: 376 MS
QTC INTERVAL: 384 MS
RBC # BLD AUTO: 3.69 10*6/MM3 (ref 4.14–5.8)
RBC # BLD AUTO: 3.9 10*6/MM3 (ref 4.14–5.8)
RBC # BLD AUTO: 4.22 10*6/MM3 (ref 4.14–5.8)
RBC # UR: ABNORMAL /HPF
RBC MORPH BLD: NORMAL
REF LAB TEST METHOD: ABNORMAL
SARS-COV-2 RDRP RESP QL NAA+PROBE: NORMAL
SMALL PLATELETS BLD QL SMEAR: ABNORMAL
SODIUM SERPL-SCNC: 136 MMOL/L (ref 136–145)
SODIUM SERPL-SCNC: 139 MMOL/L (ref 136–145)
SODIUM SERPL-SCNC: 145 MMOL/L (ref 136–145)
SP GR UR STRIP: 1.02 (ref 1–1.03)
SP GR UR STRIP: 1.02 (ref 1–1.03)
SQUAMOUS #/AREA URNS HPF: ABNORMAL /HPF
TROPONIN T SERPL-MCNC: <0.01 NG/ML (ref 0–0.03)
UROBILINOGEN UR QL STRIP: ABNORMAL
UROBILINOGEN UR QL STRIP: NORMAL
VALPROATE SERPL-MCNC: 30.7 MCG/ML (ref 50–125)
VARIANT LYMPHS NFR BLD MANUAL: 6.2 % (ref 0–5)
WBC # BLD AUTO: 11.23 10*3/MM3 (ref 3.4–10.8)
WBC # BLD AUTO: 13.12 10*3/MM3 (ref 3.4–10.8)
WBC # BLD AUTO: 7.42 10*3/MM3 (ref 3.4–10.8)
WBC MORPH BLD: NORMAL
WBC UR QL AUTO: ABNORMAL /HPF
WHOLE BLOOD HOLD SPECIMEN: NORMAL
WHOLE BLOOD HOLD SPECIMEN: NORMAL

## 2021-01-01 PROCEDURE — 25010000002 MORPHINE PER 10 MG: Performed by: EMERGENCY MEDICINE

## 2021-01-01 PROCEDURE — 80053 COMPREHEN METABOLIC PANEL: CPT | Performed by: NURSE PRACTITIONER

## 2021-01-01 PROCEDURE — 85730 THROMBOPLASTIN TIME PARTIAL: CPT | Performed by: NURSE PRACTITIONER

## 2021-01-01 PROCEDURE — 85025 COMPLETE CBC W/AUTO DIFF WBC: CPT | Performed by: FAMILY MEDICINE

## 2021-01-01 PROCEDURE — 94799 UNLISTED PULMONARY SVC/PX: CPT

## 2021-01-01 PROCEDURE — 72125 CT NECK SPINE W/O DYE: CPT

## 2021-01-01 PROCEDURE — 84484 ASSAY OF TROPONIN QUANT: CPT | Performed by: NURSE PRACTITIONER

## 2021-01-01 PROCEDURE — 71260 CT THORAX DX C+: CPT

## 2021-01-01 PROCEDURE — 71045 X-RAY EXAM CHEST 1 VIEW: CPT

## 2021-01-01 PROCEDURE — 73502 X-RAY EXAM HIP UNI 2-3 VIEWS: CPT

## 2021-01-01 PROCEDURE — 93005 ELECTROCARDIOGRAM TRACING: CPT | Performed by: NURSE PRACTITIONER

## 2021-01-01 PROCEDURE — 92610 EVALUATE SWALLOWING FUNCTION: CPT

## 2021-01-01 PROCEDURE — 96375 TX/PRO/DX INJ NEW DRUG ADDON: CPT

## 2021-01-01 PROCEDURE — 85610 PROTHROMBIN TIME: CPT | Performed by: NURSE PRACTITIONER

## 2021-01-01 PROCEDURE — 95816 EEG AWAKE AND DROWSY: CPT

## 2021-01-01 PROCEDURE — 95816 EEG AWAKE AND DROWSY: CPT | Performed by: PSYCHIATRY & NEUROLOGY

## 2021-01-01 PROCEDURE — 80053 COMPREHEN METABOLIC PANEL: CPT | Performed by: FAMILY MEDICINE

## 2021-01-01 PROCEDURE — 72128 CT CHEST SPINE W/O DYE: CPT

## 2021-01-01 PROCEDURE — 99443 PR PHYS/QHP TELEPHONE EVALUATION 21-30 MIN: CPT | Performed by: FAMILY MEDICINE

## 2021-01-01 PROCEDURE — 99285 EMERGENCY DEPT VISIT HI MDM: CPT

## 2021-01-01 PROCEDURE — 0 GADOBENATE DIMEGLUMINE 529 MG/ML SOLUTION: Performed by: NURSE PRACTITIONER

## 2021-01-01 PROCEDURE — 85007 BL SMEAR W/DIFF WBC COUNT: CPT | Performed by: PHYSICIAN ASSISTANT

## 2021-01-01 PROCEDURE — 80053 COMPREHEN METABOLIC PANEL: CPT | Performed by: PHYSICIAN ASSISTANT

## 2021-01-01 PROCEDURE — 85025 COMPLETE CBC W/AUTO DIFF WBC: CPT | Performed by: PHYSICIAN ASSISTANT

## 2021-01-01 PROCEDURE — 63710000001 DEXAMETHASONE PER 0.25 MG: Performed by: FAMILY MEDICINE

## 2021-01-01 PROCEDURE — 93010 ELECTROCARDIOGRAM REPORT: CPT | Performed by: INTERNAL MEDICINE

## 2021-01-01 PROCEDURE — 70553 MRI BRAIN STEM W/O & W/DYE: CPT

## 2021-01-01 PROCEDURE — 99231 SBSQ HOSP IP/OBS SF/LOW 25: CPT | Performed by: NEUROLOGICAL SURGERY

## 2021-01-01 PROCEDURE — 72131 CT LUMBAR SPINE W/O DYE: CPT

## 2021-01-01 PROCEDURE — A9577 INJ MULTIHANCE: HCPCS | Performed by: NURSE PRACTITIONER

## 2021-01-01 PROCEDURE — 85025 COMPLETE CBC W/AUTO DIFF WBC: CPT | Performed by: NURSE PRACTITIONER

## 2021-01-01 PROCEDURE — 25010000002 ONDANSETRON PER 1 MG: Performed by: EMERGENCY MEDICINE

## 2021-01-01 PROCEDURE — 87635 SARS-COV-2 COVID-19 AMP PRB: CPT | Performed by: NURSE PRACTITIONER

## 2021-01-01 PROCEDURE — 25010000002 IOPAMIDOL 61 % SOLUTION: Performed by: PHYSICIAN ASSISTANT

## 2021-01-01 PROCEDURE — 74177 CT ABD & PELVIS W/CONTRAST: CPT

## 2021-01-01 PROCEDURE — 92526 ORAL FUNCTION THERAPY: CPT

## 2021-01-01 PROCEDURE — 83605 ASSAY OF LACTIC ACID: CPT | Performed by: NURSE PRACTITIONER

## 2021-01-01 PROCEDURE — 84145 PROCALCITONIN (PCT): CPT | Performed by: NURSE PRACTITIONER

## 2021-01-01 PROCEDURE — 83690 ASSAY OF LIPASE: CPT | Performed by: NURSE PRACTITIONER

## 2021-01-01 PROCEDURE — 25010000002 ENOXAPARIN PER 10 MG: Performed by: FAMILY MEDICINE

## 2021-01-01 PROCEDURE — 99284 EMERGENCY DEPT VISIT MOD MDM: CPT

## 2021-01-01 PROCEDURE — 87040 BLOOD CULTURE FOR BACTERIA: CPT | Performed by: NURSE PRACTITIONER

## 2021-01-01 PROCEDURE — P9612 CATHETERIZE FOR URINE SPEC: HCPCS

## 2021-01-01 PROCEDURE — 81001 URINALYSIS AUTO W/SCOPE: CPT | Performed by: PHYSICIAN ASSISTANT

## 2021-01-01 PROCEDURE — 99254 IP/OBS CNSLTJ NEW/EST MOD 60: CPT | Performed by: NEUROLOGICAL SURGERY

## 2021-01-01 PROCEDURE — 96374 THER/PROPH/DIAG INJ IV PUSH: CPT

## 2021-01-01 PROCEDURE — 81003 URINALYSIS AUTO W/O SCOPE: CPT | Performed by: NURSE PRACTITIONER

## 2021-01-01 PROCEDURE — 70450 CT HEAD/BRAIN W/O DYE: CPT

## 2021-01-01 PROCEDURE — 80164 ASSAY DIPROPYLACETIC ACD TOT: CPT | Performed by: PHYSICIAN ASSISTANT

## 2021-01-01 PROCEDURE — 82565 ASSAY OF CREATININE: CPT

## 2021-01-01 RX ORDER — SODIUM CHLORIDE 0.9 % (FLUSH) 0.9 %
10 SYRINGE (ML) INJECTION AS NEEDED
Status: DISCONTINUED | OUTPATIENT
Start: 2021-01-01 | End: 2021-01-01 | Stop reason: HOSPADM

## 2021-01-01 RX ORDER — LEVETIRACETAM 500 MG/1
1000 TABLET ORAL 2 TIMES DAILY
Status: DISCONTINUED | OUTPATIENT
Start: 2021-01-01 | End: 2021-01-01 | Stop reason: HOSPADM

## 2021-01-01 RX ORDER — LOSARTAN POTASSIUM 100 MG/1
TABLET ORAL
Qty: 90 TABLET | Refills: 1 | OUTPATIENT
Start: 2021-01-01

## 2021-01-01 RX ORDER — LACOSAMIDE 150 MG/1
150 TABLET, FILM COATED ORAL 2 TIMES DAILY
COMMUNITY

## 2021-01-01 RX ORDER — DIVALPROEX SODIUM 500 MG/1
500 TABLET, DELAYED RELEASE ORAL 3 TIMES DAILY
Status: DISCONTINUED | OUTPATIENT
Start: 2021-01-01 | End: 2021-01-01 | Stop reason: HOSPADM

## 2021-01-01 RX ORDER — VENLAFAXINE HYDROCHLORIDE 75 MG/1
150 CAPSULE, EXTENDED RELEASE ORAL DAILY
Status: DISCONTINUED | OUTPATIENT
Start: 2021-01-01 | End: 2021-01-01 | Stop reason: HOSPADM

## 2021-01-01 RX ORDER — MULTIVITAMIN WITH IRON
100 TABLET ORAL DAILY
COMMUNITY

## 2021-01-01 RX ORDER — OXYCODONE AND ACETAMINOPHEN 10; 325 MG/1; MG/1
1 TABLET ORAL EVERY 4 HOURS PRN
COMMUNITY

## 2021-01-01 RX ORDER — SODIUM CHLORIDE 0.9 % (FLUSH) 0.9 %
10 SYRINGE (ML) INJECTION EVERY 12 HOURS SCHEDULED
Status: DISCONTINUED | OUTPATIENT
Start: 2021-01-01 | End: 2021-01-01 | Stop reason: HOSPADM

## 2021-01-01 RX ORDER — LACOSAMIDE 50 MG/1
150 TABLET ORAL 2 TIMES DAILY
Status: DISCONTINUED | OUTPATIENT
Start: 2021-01-01 | End: 2021-01-01 | Stop reason: HOSPADM

## 2021-01-01 RX ORDER — DIVALPROEX SODIUM 500 MG/1
1000 TABLET, DELAYED RELEASE ORAL EVERY 8 HOURS
COMMUNITY

## 2021-01-01 RX ORDER — CHLORAL HYDRATE 500 MG
2000 CAPSULE ORAL EVERY MORNING
COMMUNITY

## 2021-01-01 RX ORDER — LORAZEPAM 0.5 MG/1
0.5 TABLET ORAL EVERY 8 HOURS PRN
Qty: 9 TABLET | Refills: 0 | Status: SHIPPED | OUTPATIENT
Start: 2021-01-01 | End: 2021-01-01

## 2021-01-01 RX ORDER — VENLAFAXINE HYDROCHLORIDE 150 MG/1
CAPSULE, EXTENDED RELEASE ORAL
Qty: 90 CAPSULE | Refills: 1 | OUTPATIENT
Start: 2021-01-01

## 2021-01-01 RX ORDER — OXYCODONE AND ACETAMINOPHEN 10; 325 MG/1; MG/1
1 TABLET ORAL EVERY 4 HOURS PRN
Status: DISCONTINUED | OUTPATIENT
Start: 2021-01-01 | End: 2021-01-01 | Stop reason: HOSPADM

## 2021-01-01 RX ORDER — LEVETIRACETAM 250 MG/1
250 TABLET ORAL 2 TIMES DAILY
Status: DISCONTINUED | OUTPATIENT
Start: 2021-01-01 | End: 2021-01-01 | Stop reason: HOSPADM

## 2021-01-01 RX ORDER — LORAZEPAM 0.5 MG/1
0.5 TABLET ORAL EVERY 8 HOURS PRN
Status: DISCONTINUED | OUTPATIENT
Start: 2021-01-01 | End: 2021-01-01 | Stop reason: HOSPADM

## 2021-01-01 RX ORDER — DEXAMETHASONE 4 MG/1
4 TABLET ORAL 2 TIMES DAILY WITH MEALS
COMMUNITY
Start: 2021-01-01

## 2021-01-01 RX ORDER — METOPROLOL SUCCINATE 50 MG/1
50 TABLET, EXTENDED RELEASE ORAL DAILY
Status: DISCONTINUED | OUTPATIENT
Start: 2021-01-01 | End: 2021-01-01 | Stop reason: HOSPADM

## 2021-01-01 RX ORDER — METOPROLOL SUCCINATE 50 MG/1
50 TABLET, EXTENDED RELEASE ORAL NIGHTLY
COMMUNITY

## 2021-01-01 RX ORDER — PANTOPRAZOLE SODIUM 40 MG/1
40 TABLET, DELAYED RELEASE ORAL DAILY
Status: DISCONTINUED | OUTPATIENT
Start: 2021-01-01 | End: 2021-01-01 | Stop reason: HOSPADM

## 2021-01-01 RX ORDER — PROMETHAZINE HYDROCHLORIDE 12.5 MG/1
12.5 TABLET ORAL EVERY 4 HOURS PRN
COMMUNITY
Start: 2021-01-01

## 2021-01-01 RX ORDER — VENLAFAXINE HYDROCHLORIDE 150 MG/1
150 CAPSULE, EXTENDED RELEASE ORAL DAILY
COMMUNITY

## 2021-01-01 RX ORDER — PANTOPRAZOLE SODIUM 40 MG/1
40 TABLET, DELAYED RELEASE ORAL DAILY
COMMUNITY

## 2021-01-01 RX ORDER — SODIUM CHLORIDE 9 MG/ML
100 INJECTION, SOLUTION INTRAVENOUS CONTINUOUS
Status: DISCONTINUED | OUTPATIENT
Start: 2021-01-01 | End: 2021-01-01

## 2021-01-01 RX ORDER — DEXAMETHASONE 4 MG/1
4 TABLET ORAL 2 TIMES DAILY
Status: DISCONTINUED | OUTPATIENT
Start: 2021-01-01 | End: 2021-01-01 | Stop reason: HOSPADM

## 2021-01-01 RX ORDER — LACOSAMIDE 150 MG/1
TABLET, FILM COATED ORAL
Qty: 60 TABLET | Refills: 2 | OUTPATIENT
Start: 2021-01-01

## 2021-01-01 RX ORDER — ALPRAZOLAM 1 MG/1
TABLET ORAL
Status: ON HOLD | COMMUNITY
Start: 2021-01-01 | End: 2021-01-01

## 2021-01-01 RX ORDER — LEVETIRACETAM 250 MG/1
250 TABLET ORAL 2 TIMES DAILY
COMMUNITY

## 2021-01-01 RX ORDER — ONDANSETRON 2 MG/ML
4 INJECTION INTRAMUSCULAR; INTRAVENOUS ONCE
Status: COMPLETED | OUTPATIENT
Start: 2021-01-01 | End: 2021-01-01

## 2021-01-01 RX ORDER — PANTOPRAZOLE SODIUM 40 MG/1
TABLET, DELAYED RELEASE ORAL
Qty: 90 TABLET | Refills: 0 | OUTPATIENT
Start: 2021-01-01

## 2021-01-01 RX ORDER — OXYCODONE AND ACETAMINOPHEN 10; 325 MG/1; MG/1
1 TABLET ORAL EVERY 6 HOURS PRN
Status: DISCONTINUED | OUTPATIENT
Start: 2021-01-01 | End: 2021-01-01 | Stop reason: HOSPADM

## 2021-01-01 RX ORDER — AMLODIPINE BESYLATE 5 MG/1
5 TABLET ORAL DAILY
Qty: 30 TABLET | Refills: 5 | Status: SHIPPED | OUTPATIENT
Start: 2021-01-01

## 2021-01-01 RX ORDER — LEVETIRACETAM 1000 MG/1
1000 TABLET ORAL 2 TIMES DAILY
COMMUNITY

## 2021-01-01 RX ADMIN — SODIUM CHLORIDE 1000 ML: 9 INJECTION, SOLUTION INTRAVENOUS at 20:18

## 2021-01-01 RX ADMIN — LEVETIRACETAM 1000 MG: 500 TABLET, FILM COATED ORAL at 09:28

## 2021-01-01 RX ADMIN — SODIUM CHLORIDE, PRESERVATIVE FREE 10 ML: 5 INJECTION INTRAVENOUS at 09:49

## 2021-01-01 RX ADMIN — ONDANSETRON HYDROCHLORIDE 4 MG: 2 SOLUTION INTRAMUSCULAR; INTRAVENOUS at 15:59

## 2021-01-01 RX ADMIN — LACOSAMIDE 150 MG: 50 TABLET, FILM COATED ORAL at 20:59

## 2021-01-01 RX ADMIN — GADOBENATE DIMEGLUMINE 16 ML: 529 INJECTION, SOLUTION INTRAVENOUS at 12:49

## 2021-01-01 RX ADMIN — SODIUM CHLORIDE, PRESERVATIVE FREE 10 ML: 5 INJECTION INTRAVENOUS at 09:26

## 2021-01-01 RX ADMIN — DIVALPROEX SODIUM 500 MG: 500 TABLET, DELAYED RELEASE ORAL at 09:36

## 2021-01-01 RX ADMIN — SODIUM CHLORIDE 500 ML: 9 INJECTION, SOLUTION INTRAVENOUS at 15:59

## 2021-01-01 RX ADMIN — SODIUM CHLORIDE 100 ML/HR: 9 INJECTION, SOLUTION INTRAVENOUS at 02:11

## 2021-01-01 RX ADMIN — LACOSAMIDE 150 MG: 50 TABLET, FILM COATED ORAL at 09:47

## 2021-01-01 RX ADMIN — DIVALPROEX SODIUM 500 MG: 500 TABLET, DELAYED RELEASE ORAL at 20:59

## 2021-01-01 RX ADMIN — LEVETIRACETAM 250 MG: 250 TABLET ORAL at 09:28

## 2021-01-01 RX ADMIN — LACOSAMIDE 150 MG: 50 TABLET, FILM COATED ORAL at 09:27

## 2021-01-01 RX ADMIN — LEVETIRACETAM 1000 MG: 500 TABLET, FILM COATED ORAL at 09:48

## 2021-01-01 RX ADMIN — ENOXAPARIN SODIUM 40 MG: 40 INJECTION SUBCUTANEOUS at 09:52

## 2021-01-01 RX ADMIN — LEVETIRACETAM 250 MG: 250 TABLET ORAL at 09:49

## 2021-01-01 RX ADMIN — ENOXAPARIN SODIUM 40 MG: 40 INJECTION SUBCUTANEOUS at 09:26

## 2021-01-01 RX ADMIN — OXYCODONE HYDROCHLORIDE AND ACETAMINOPHEN 1 TABLET: 10; 325 TABLET ORAL at 21:33

## 2021-01-01 RX ADMIN — LEVETIRACETAM 1000 MG: 500 TABLET, FILM COATED ORAL at 21:00

## 2021-01-01 RX ADMIN — METOPROLOL SUCCINATE 50 MG: 50 TABLET, FILM COATED, EXTENDED RELEASE ORAL at 09:27

## 2021-01-01 RX ADMIN — PANTOPRAZOLE SODIUM 40 MG: 40 TABLET, DELAYED RELEASE ORAL at 09:47

## 2021-01-01 RX ADMIN — PANTOPRAZOLE SODIUM 40 MG: 40 TABLET, DELAYED RELEASE ORAL at 09:28

## 2021-01-01 RX ADMIN — VENLAFAXINE HYDROCHLORIDE 150 MG: 75 CAPSULE, EXTENDED RELEASE ORAL at 09:48

## 2021-01-01 RX ADMIN — VENLAFAXINE HYDROCHLORIDE 150 MG: 75 CAPSULE, EXTENDED RELEASE ORAL at 09:27

## 2021-01-01 RX ADMIN — SODIUM CHLORIDE, PRESERVATIVE FREE 10 ML: 5 INJECTION INTRAVENOUS at 02:12

## 2021-01-01 RX ADMIN — DEXAMETHASONE 4 MG: 4 TABLET ORAL at 20:59

## 2021-01-01 RX ADMIN — SODIUM CHLORIDE, PRESERVATIVE FREE 10 ML: 5 INJECTION INTRAVENOUS at 20:59

## 2021-01-01 RX ADMIN — DIVALPROEX SODIUM 500 MG: 500 TABLET, DELAYED RELEASE ORAL at 15:16

## 2021-01-01 RX ADMIN — DEXAMETHASONE 4 MG: 4 TABLET ORAL at 09:47

## 2021-01-01 RX ADMIN — DIVALPROEX SODIUM 500 MG: 500 TABLET, DELAYED RELEASE ORAL at 16:33

## 2021-01-01 RX ADMIN — LEVETIRACETAM 250 MG: 250 TABLET ORAL at 21:00

## 2021-01-01 RX ADMIN — DEXAMETHASONE 4 MG: 4 TABLET ORAL at 09:28

## 2021-01-01 RX ADMIN — MORPHINE SULFATE 4 MG: 4 INJECTION, SOLUTION INTRAMUSCULAR; INTRAVENOUS at 15:59

## 2021-01-01 RX ADMIN — IOPAMIDOL 100 ML: 612 INJECTION, SOLUTION INTRAVENOUS at 18:55

## 2021-01-01 RX ADMIN — DIVALPROEX SODIUM 500 MG: 500 TABLET, DELAYED RELEASE ORAL at 09:48

## 2021-01-01 RX ADMIN — OXYCODONE HYDROCHLORIDE AND ACETAMINOPHEN 1 TABLET: 10; 325 TABLET ORAL at 11:50

## 2021-01-01 RX ADMIN — METOPROLOL SUCCINATE 50 MG: 50 TABLET, FILM COATED, EXTENDED RELEASE ORAL at 09:48

## 2021-01-01 NOTE — PAYOR COMM NOTE
"DC TO SNF 12-31-20    Lukasz Wahl Mary (55 y.o. Male)     Date of Birth Social Security Number Address Home Phone MRN    1965  [address] [facility] [place] [address] [phone] [id number]    Caodaism Marital Status          [facility] [place] Single       Admission Date Admission Type Admitting Provider Attending Provider Department, Room/Bed    12/28/20 Emergency Jairon Webb MD  [facility] 3A, 349/1    Discharge Date Discharge Disposition Discharge Destination        12/31/2020 Skilled Nursing Facility (DC - External)              Attending Provider: (none)   Allergies: Lipitor [Atorvastatin], Lisinopril, Pregabalin, Allopurinol    Isolation: None   Infection: C.difficile (07/07/17), COVID (rule out) (12/31/20)   Code Status: Prior    Ht: 176.8 cm (69.6\")   Wt: 82 kg (180 lb 11.2 oz)    Admission Cmt: None   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]                 Active Insurance as of 12/28/2020     Primary Coverage     Payor Plan Insurance Group Employer/Plan Group    [employer] [place] [employer] [place]      Payor Plan Address Payor Plan Phone Number Payor Plan Fax Number Effective Dates    [address] [address]   7/1/2017 - None Entered    [email or web address] [place] [address]       Subscriber Name Subscriber Birth Date Member ID       LUKASZ WAHL MARY 1965 [id number]                 Emergency Contacts      (Rel.) Home Phone Work Phone Mobile Phone    Ivonne Wahl (Mother) [phone] -- [phone]    Yvette Pollack (Daughter) [phone] -- [phone]               Discharge Summary      Jairon Webb MD at 12/31/20 1507              [facility]  DISCHARGE SUMMARY     Date of Admission: 12/28/2020  Date of Discharge:  12/31/2020  Primary Care Physician: Malia Vargas MD    Presenting Problem/History of Present Illness:  Requires assistance with activities of daily " living (ADL) [Z74.1]     Discharge Diagnoses:  Active Hospital Problems    Diagnosis   • **Inoperable anaplastic glioma of brain    • Moderate malnutrition (CMS/HCC)   • Requires continuous supervision for activities of daily living (ADL)   • Obstructive sleep apnea   • Altered mental status   • Left hemiparesis (CMS/HCC)   • Seizure disorder (CMS/HCC)   • Poorly-controlled hypertension     Chief Complaint on Day of Discharge: No complaints.  Mother in room.  He denies nausea, vomiting or abdominal pain.      History of Present Illness on Day of Discharge:   Lying in bed.  No oxygen in use.  Mother in room.  He does report dry mouth.  Denies nausea, vomiting or abdominal pain.  Denies chest pain, palpitations, shortness of breath.  Reports tenderness left shoulder.    Consults:   1.  Dr. Dillon Trevino, neurosurgery  2.  Laura Collins,, APRN palliative care    Procedures Performed: None    Pertinent Test Results:     Laboratory Data Last 7 Days:  Results from last 7 days   Lab Units 12/30/20  0659 12/28/20  1119   WBC 10*3/mm3 7.40 5.61   HEMOGLOBIN g/dL 12.6* 13.9   HEMATOCRIT % 34.8* 39.4   PLATELETS 10*3/mm3 164 160     Results from last 7 days   Lab Units 12/31/20  0503 12/30/20  0541 12/28/20  1119   SODIUM mmol/L 142 145 142   POTASSIUM mmol/L 3.7 3.4* 3.8   CHLORIDE mmol/L 108* 107 106   CO2 mmol/L 27.0 29.0 29.0   BUN mg/dL 14 12 16   CREATININE mg/dL 0.80 0.77 0.85   GLUCOSE mg/dL 98 85 106*   CALCIUM mg/dL 8.9 9.2 8.8   ALT (SGPT) U/L  --   --  23     Laboratory Data Last 7 Days:    Lab Results (last 7 days)     Procedure Component Value Units Date/Time    Extra Tubes [[id number]] Collected: 12/31/20 0503    Specimen: Blood, Venous Line Updated: 12/31/20 0800    Narrative:      The following orders were created for panel order Extra Tubes.  Procedure                               Abnormality         Status                     ---------                               -----------         ------                      Lavender Top[[id number]]                                     Final result                 Please view results for these tests on the individual orders.    Lavender Top [[id number]] Collected: 12/31/20 0503    Specimen: Blood Updated: 12/31/20 0800     Extra Tube hold for add-on     Comment: Auto resulted       Basic Metabolic Panel [[id number]]  (Abnormal) Collected: 12/31/20 0503    Specimen: Blood Updated: 12/31/20 0618     Glucose 98 mg/dL      BUN 14 mg/dL      Creatinine 0.80 mg/dL      Sodium 142 mmol/L      Potassium 3.7 mmol/L      Comment: Slight hemolysis detected by analyzer. Results may be affected.        Chloride 108 mmol/L      CO2 27.0 mmol/L      Calcium 8.9 mg/dL      eGFR Non African Amer 101 mL/min/1.73      BUN/Creatinine Ratio 17.5     Anion Gap 7.0 mmol/L     Narrative:      GFR Normal >60  Chronic Kidney Disease <60  Kidney Failure <15      CBC (No Diff) [[id number]]  (Abnormal) Collected: 12/30/20 0659    Specimen: Blood Updated: 12/30/20 0721     WBC 7.40 10*3/mm3      RBC 3.89 10*6/mm3      Hemoglobin 12.6 g/dL      Hematocrit 34.8 %      MCV 89.5 fL      MCH 32.4 pg      MCHC 36.2 g/dL      RDW 13.2 %      RDW-SD 43.3 fl      MPV 10.5 fL      Platelets 164 10*3/mm3     Basic Metabolic Panel [[id number]]  (Abnormal) Collected: 12/30/20 0541    Specimen: Blood Updated: 12/30/20 0610     Glucose 85 mg/dL      BUN 12 mg/dL      Creatinine 0.77 mg/dL      Sodium 145 mmol/L      Potassium 3.4 mmol/L      Chloride 107 mmol/L      CO2 29.0 mmol/L      Calcium 9.2 mg/dL      eGFR Non African Amer 105 mL/min/1.73      BUN/Creatinine Ratio 15.6     Anion Gap 9.0 mmol/L     Narrative:      GFR Normal >60  Chronic Kidney Disease <60  Kidney Failure <15      Urinalysis With Culture If Indicated - Urine, Catheter In/Out [[id number]]  (Abnormal) Collected: 12/29/20 1133    Specimen: Urine, Catheter In/Out Updated: 12/29/20 1201     Color, UA Dark Yellow     Appearance, UA Clear     pH, UA 6.0      Specific Gravity, UA >1.030     Glucose, UA Negative     Ketones, UA Trace     Bilirubin, UA Small (1+)     Blood, UA Negative     Protein, UA Negative     Leuk Esterase, UA Negative     Nitrite, UA Negative     Urobilinogen, UA 1.0 E.U./dL    Narrative:      Urine microscopic not indicated.    COVID PRE-OP / PRE-PROCEDURE SCREENING ORDER (NO ISOLATION) - Swab, Nasal Cavity [[id number]]  (Normal) Collected: 12/28/20 1653    Specimen: Swab from Nasal Cavity Updated: 12/28/20 1807    Narrative:      The following orders were created for panel order COVID PRE-OP / PRE-PROCEDURE SCREENING ORDER (NO ISOLATION) - Swab, Nasal Cavity.  Procedure                               Abnormality         Status                     ---------                               -----------         ------                     COVID-19,[employer] IN-VIOLETA...[[id number]]  Normal              Final result                 Please view results for these tests on the individual orders.    COVID-19,[employer] IN-HOUSE,Nasal Swab No Transport Media 3-4 HR TAT - Swab, Nasal Cavity [[id number]]  (Normal) Collected: 12/28/20 1653    Specimen: Swab from Nasal Cavity Updated: 12/28/20 1807     COVID19 Not Detected    Narrative:      Fact sheet for providers: https://www.fda.gov/media/582479/download     Fact sheet for patients: https://www.fda.gov/media/595423/download    Test performed by PCR.    [place] Draw [[id number]] Collected: 12/28/20 1119    Specimen: Blood Updated: 12/28/20 1230    Narrative:      The following orders were created for panel order [place] Draw.  Procedure                               Abnormality         Status                     ---------                               -----------         ------                     Light Blue Top[[id number]]                                   Final result               Green Top (Gel)[[id number]]                                  Final result               Lavender Top[[id number]]                                      Final result               Red Top[[id number]]                                          Final result                 Please view results for these tests on the individual orders.    Light Blue Top [[id number]] Collected: 12/28/20 1119    Specimen: Blood Updated: 12/28/20 1230     Extra Tube hold for add-on     Comment: Auto resulted       Green Top (Gel) [[id number]] Collected: 12/28/20 1119    Specimen: Blood Updated: 12/28/20 1230     Extra Tube Hold for add-ons.     Comment: Auto resulted.       Lavender Top [[id number]] Collected: 12/28/20 1119    Specimen: Blood Updated: 12/28/20 1230     Extra Tube hold for add-on     Comment: Auto resulted       Red Top [[id number]] Collected: 12/28/20 1119    Specimen: Blood Updated: 12/28/20 1230     Extra Tube Hold for add-ons.     Comment: Auto resulted.       Comprehensive Metabolic Panel [[id number]]  (Abnormal) Collected: 12/28/20 1119    Specimen: Blood Updated: 12/28/20 1144     Glucose 106 mg/dL      BUN 16 mg/dL      Creatinine 0.85 mg/dL      Sodium 142 mmol/L      Potassium 3.8 mmol/L      Chloride 106 mmol/L      CO2 29.0 mmol/L      Calcium 8.8 mg/dL      Total Protein 5.9 g/dL      Albumin 3.30 g/dL      ALT (SGPT) 23 U/L      AST (SGOT) 30 U/L      Alkaline Phosphatase 56 U/L      Total Bilirubin 0.2 mg/dL      eGFR Non African Amer 94 mL/min/1.73      Globulin 2.6 gm/dL      A/G Ratio 1.3 g/dL      BUN/Creatinine Ratio 18.8     Anion Gap 7.0 mmol/L     Narrative:      GFR Normal >60  Chronic Kidney Disease <60  Kidney Failure <15      Valproic Acid Level, Total [[id number]]  (Normal) Collected: 12/28/20 1119    Specimen: Blood Updated: 12/28/20 1142     Valproic Acid 92.2 mcg/mL     POC Glucose Once [[id number]]  (Normal) Collected: 12/28/20 1123    Specimen: Blood Updated: 12/28/20 1138     Glucose 89 mg/dL      Comment: : [id number] [facility] ID: [id number]       CBC & Differential [[id number]]  (Abnormal) Collected: 12/28/20 1119    Specimen: Blood  Updated: 12/28/20 1126    Narrative:      The following orders were created for panel order CBC & Differential.  Procedure                               Abnormality         Status                     ---------                               -----------         ------                     CBC Auto Differential[[id number]]        Abnormal            Final result                 Please view results for these tests on the individual orders.    CBC Auto Differential [[id number]]  (Abnormal) Collected: 12/28/20 1119    Specimen: Blood Updated: 12/28/20 1126     WBC 5.61 10*3/mm3      RBC 4.33 10*6/mm3      Hemoglobin 13.9 g/dL      Hematocrit 39.4 %      MCV 91.0 fL      MCH 32.1 pg      MCHC 35.3 g/dL      RDW 13.2 %      RDW-SD 45.1 fl      MPV 9.8 fL      Platelets 160 10*3/mm3      Neutrophil % 49.6 %      Lymphocyte % 29.8 %      Monocyte % 16.2 %      Eosinophil % 3.2 %      Basophil % 0.5 %      Immature Grans % 0.7 %      Neutrophils, Absolute 2.78 10*3/mm3      Lymphocytes, Absolute 1.67 10*3/mm3      Monocytes, Absolute 0.91 10*3/mm3      Eosinophils, Absolute 0.18 10*3/mm3      Basophils, Absolute 0.03 10*3/mm3      Immature Grans, Absolute 0.04 10*3/mm3      nRBC 0.0 /100 WBC            Imaging Results (All)     Procedure Component Value Units Date/Time    XR Shoulder 2+ View Left [[id number]] Collected: 12/31/20 1658     Updated: 12/31/20 1709    Narrative:      HISTORY: Pain and swelling left shoulder     Left shoulder: 2 views left shoulder are obtained.     There is lateral downsloping of the acromium. There is mild arthritic  change of the common clavicular and glenohumeral joints. Insertional  cysts suspected on the greater tuberosity. No acute fracture or  dislocation.       Impression:      1. Arthritic changes of the left shoulder with lateral downsloping of  the acromion. No acute bony pathology.  This report was finalized on 12/31/2020 17:05 by Dr. Sri Rivera MD.    CT Head With Contrast [[id number]]  Collected: 12/28/20 1343     Updated: 12/28/20 1354    Narrative:      Exam: CT head with IV contrast     Indication: Mental status change, unknown cause     Comparison: 9/9/2020     Dose length product: 704 mGy cm. Automated exposure control was also  utilized to decrease patient radiation dose.     Findings:     Performed exam is a CT head with IV contrast. This significantly  decreases sensitivity for detection of acute intracranial hemorrhage.     No large extra-axial fluid collection or evidence of acute intracranial  hemorrhage. No loss of gray-white differentiation to suggest acute  infarct. Chronic microvascular ischemic white matter change is again  noted.      Postoperative change of prior RIGHT frontal craniotomy. No short  interval change in punctate high attenuation in the RIGHT frontal lobe  compared to recent noncontrast exams. No definite enhancement within the  RIGHT frontal treatment site although evaluation is limited given lack  of precontrast imaging.     No midline shift or mass effect. Lateral ventricles are stable in  caliber. Basilar cisterns are patent. No acute orbital finding. Mastoid  air cells and visualized paranasal sinuses are clear other than for mild  mucosal thickening in the maxillary sinuses. No acute osseous finding.       Impression:      Impression:     1.  No acute intracranial findings.  2.  Stable posttreatment change in the RIGHT frontal lobe with unchanged  area of RIGHT frontal parenchymal calcification.  3.  Performed exam is a CT head with IV contrast. This significantly  decreases sensitivity for detection of acute intracranial hemorrhage.  This report was finalized on 12/28/2020 13:51 by Dr. Reilly Gallardo MD.    XR Chest 1 View [[id number]] Collected: 12/28/20 1158     Updated: 12/28/20 1201    Narrative:      Frontal upright radiograph of the chest 12/28/2020 11:22 AM CST     HISTORY: Altered mental status     COMPARISON: Chest exam dated 3/11/2020.     FINDINGS:       No lung consolidation. No pleural effusion or pneumothorax. The  cardiomediastinal silhouette and pulmonary vascularity are within normal  limits. The osseous structures and surrounding soft tissues demonstrate  no acute abnormality. Chronic right midshaft clavicle fracture.       Impression:      1. Stable chest exam without acute process.        This report was finalized on 12/28/2020 11:58 by Dr Vincent Faith, .      Hospital Course  Patient is a 55 y.o. male presented to [facility] emergency room 12/28/2020 with complaints of worsening confusion.  He has a medical history of anaplastic glioma status post craniotomy with biopsy 4/15/2019.  He has undergone both radiation and chemotherapy.  He is followed by Dr. Romero oncology.  Patient is cared for by his mother.  He was brought to the emergency room due to worsening confusion and decreased functional decline.  Recent office visit note 12/9/2020 discussed inpatient rehab.  Patient does have home health.  Dr. Trevino evaluated patient and felt as though weakness and confusion more likely progression of microscopic disease.  Patient has left-sided weakness, poorly controlled seizures, poorly controlled hypertension.  He was reported to have intermittent confusion on admission.  Mother was requesting rehab at [facility] and social service consulted.  Mother unable to care for patient at home at this time.  Creatinine 0.85, WBC 5.61.  CT scan of the head no acute intracranial findings.  Stable posttreatment changes in the right frontal lobe with unchanged area of right frontal parenchymal calcification.  Chest x-ray stable.    He was admitted to the neurology floor with inoperable angioplastic glioma of the brain requiring supervision for activity of daily living.  Physical therapy, speech therapy occupational therapy consulted.  SCDs placed for deep vein thrombosis prophylaxis.  IV fluids at 75 mL/h.  Oxygen as needed.  Patient has sleep apnea and has  "CPAP machine at home.    Neurology consulted and he was seen by Dr. Trevino who reviewed CT scan of the brain that did not demonstrate any obvious progression of disease or worsening edema.  Patient has gradually decline in function over the last several months.  This is either microscopic progression of disease that is not visible on MRI or long-term side effects of radiation treatment.  Elderly mother can no longer provide care for him at this point.  Rehab or skilled facility reasonable to see if patient may regain any functional mobility.  Palliative care consult and he was seen by EPI Osei.    Moderate malnutrition per dietitian.  Boost daily with breakfast.  Magic cup twice daily with lunch and supper.  Patient started on Megace.    Social service consulted for discharge planning.  Referral made to [facility] but patient did was not offered bed.  Mother requested referral to [facility].    On 12/31/2020 he has been offered bed at [facility] for ongoing physical therapy, Occupational Therapy and speech therapy in hopes that patient may regain some function.  He denies chest pain, palpitations, shortness of breath.  He remains weak on the left side.  He attempts to follow commands.  Answers most questions appropriately.  Denies nausea, vomiting or abdominal pain.    Physical Exam on Discharge:  /77 (BP Location: Right arm, Patient Position: Lying)   Pulse 77   Temp 98.2 °F (36.8 °C) (Oral)   Resp 16   Ht 176.8 cm (69.6\")   Wt 82 kg (180 lb 11.2 oz)   SpO2 95%   BMI 26.23 kg/m²   Physical Exam  Vitals signs and nursing note reviewed.   Constitutional:       Comments: Lying in bed.  No oxygen in use.  Mother in room.  Answers most questions appropriately.   HENT:      Head: Normocephalic and atraumatic.      Mouth/Throat:      Pharynx: No oropharyngeal exudate.   Eyes:      Extraocular Movements: Extraocular movements intact.      Pupils: Pupils are equal, round, and reactive to light. "   Neck:      Musculoskeletal: Neck supple.   Cardiovascular:      Rate and Rhythm: Normal rate and regular rhythm.      Comments: Normal sinus rhythm  on telemetry  Pulmonary:      Effort: Pulmonary effort is normal.      Breath sounds: No wheezing, rhonchi or rales.      Comments: No oxygen in use.  Abdominal:      Palpations: Abdomen is soft.   Genitourinary:     Comments: Voiding.  Musculoskeletal:         General: Tenderness (Left shoulder) present.   Neurological:      Comments: Answers most questions appropriately.  Oriented to person place and time.  Identifies mother.  Moves extremities to command.  Weak movement left foot.  Left side weaker than right.   Psychiatric:         Mood and Affect: Mood normal.         Behavior: Behavior normal.       Condition on Discharge: Stable    Discharge Disposition: [facility]    Discharge Diet:   Diet Instructions     Advance Diet As Tolerated        Soft texture.  Advance as tolerated.  Boost daily with breakfast.  Magic cup twice daily with lunch and dinner.    Activity at Discharge:   Activity Instructions     Activity as Tolerated        As tolerated    Discharge Care Plan / Instructions:   1.  Physical therapy and Occupational Therapy twice daily  2.  SCDs  3.  Boost with breakfast.  Magic cup with lunch and dinner.  4.  Follow-up with Dr. Malia Vargas after discharged from [facility].  5.  CPAP machine at night and during the day with naps.    Discharge Medications:     Discharge Medications      New Medications      Instructions Start Date   acetaminophen 325 MG tablet  Commonly known as: TYLENOL   650 mg, Oral, Every 4 Hours PRN      megestrol 40 MG/ML suspension  Commonly known as: MEGACE   400 mg, Oral, Daily   Start Date: January 1, 2021     polyethylene glycol 17 g packet  Commonly known as: MIRALAX   17 g, Oral, Daily   Start Date: January 1, 2021        Changes to Medications      Instructions Start Date   divalproex 500 MG DR tablet  Commonly known  as: DEPAKOTE  What changed: See the new instructions.   TAKE TWO TABLETS BY MOUTH EVERY 8 HOURS      fish oil 1000 MG capsule capsule  What changed: See the new instructions.   TAKE TWO CAPSULES BY MOUTH EVERY MORNING WITH BREAKFAST      lactulose 20 GM/30ML solution solution  What changed:   · when to take this  · reasons to take this   20 g, Oral, Daily PRN      oxyCODONE-acetaminophen  MG per tablet  Commonly known as: PERCOCET  What changed:   · when to take this  · reasons to take this   1 tablet, Oral, Every 6 Hours PRN      Vimpat 150 MG tablet  Generic drug: Lacosamide  What changed: when to take this   TAKE ONE TABLET BY MOUTH TWICE A DAY.         Continue These Medications      Instructions Start Date   amLODIPine 5 MG tablet  Commonly known as: Norvasc   5 mg, Oral, Daily      clobetasol 0.05 % external solution  Commonly known as: TEMOVATE   1 application, Apply to affected area on the scalp twice daily in the morning and evening for 2 weeks as needed.      cyclobenzaprine 5 MG tablet  Commonly known as: FLEXERIL   5 mg, Oral, 3 Times Daily PRN      fluticasone 50 MCG/ACT nasal spray  Commonly known as: FLONASE   2 sprays, Nasal, Daily PRN      hydrocortisone 2.5 % cream   1 application, Topical, Daily PRN, Apply to affected area on the face once daily for one week as needed.      levETIRAcetam 1000 MG tablet  Commonly known as: KEPPRA   TAKE ONE TABLET BY MOUTH TWICE A DAY      levETIRAcetam 250 MG tablet  Commonly known as: KEPPRA   TAKE ONE TABLET BY MOUTH TWICE A DAY      losartan 100 MG tablet  Commonly known as: COZAAR   100 mg, Oral, Daily      metoprolol succinate XL 50 MG 24 hr tablet  Commonly known as: TOPROL-XL   25 mg, Oral, Daily      mupirocin 2 % ointment  Commonly known as: BACTROBAN   1 application, Topical, 3 Times Daily, Apply a small amount to scabs on arms three times daily.      ondansetron 4 MG tablet  Commonly known as: ZOFRAN   8 mg, Oral, Every 8 Hours PRN      oxybutynin  5 MG tablet  Commonly known as: DITROPAN   5 mg, Oral, Every 24 Hours      pantoprazole 40 MG EC tablet  Commonly known as: PROTONIX   TAKE ONE TABLET BY MOUTH DAILY.      temozolomide 100 MG chemo capsule  Commonly known as: TEMODAR   Takes 3 capsules for 5 days, off 23 days.      triamcinolone 0.1 % cream  Commonly known as: KENALOG   1 application, Topical, 2 Times Daily PRN, Apply to Affected Areas on hands and arms twice daily for 2 weeks then as needed for itchy skin.      venlafaxine  MG 24 hr capsule  Commonly known as: EFFEXOR-XR   TAKE ONE CAPSULE BY MOUTH DAILY      vitamin B-6 100 MG tablet  Commonly known as: PYRIDOXINE   TAKE ONE TABLET BY MOUTH DAILY         Stop These Medications    ALPRAZolam 0.25 MG tablet  Commonly known as: XANAX     promethazine 12.5 MG tablet  Commonly known as: PHENERGAN          Follow-up Appointments:    Contact information for follow-up providers     Malia Vargas MD Follow up.    Specialties: Family Medicine, Emergency Medicine, Urgent Care  Why: After discharge from [facility]  Contact information:  [address] [address]  [phone]                   Contact information for after-discharge care     Destination     [facility] .    Service: Skilled Nursing  Contact information:  [address]  [facility] [address]  [phone]                           Future Appointments:  Future Appointments   Date Time Provider Department Center   1/20/2021 11:30 AM Royal Todd PA [facility] N PAD PAD   1/20/2021  1:10 PM Froylan Boland MD [facility] U PAD None   3/16/2021  1:30 PM Dillon Trevino MD [facility] NS PAD PAD       Test Results Pending at Discharge: None    The above documentation resulted from a face-to-face encounter by me Aracelis CHOWDARY, [facility].    Electronically signed by EPI Rice, 12/31/2020, 17:31 CST.    Time: This discharge process required greater than 35 minutes for  completion.    Plan discussed with Dr. Webb, patient, and mother.    Time spent in face-to-face evaluation, chart review, planning and education greater than 35 minutes.    I personally evaluated and examined the patient in conjunction with EPI Benítez and agree with the assessment, treatment plan, and disposition of the patient as recorded by her. My history, exam, and further recommendations are:     Patient seen and examined.  Appropriate for placement.  Discussed with patient and his mother.    Electronically signed by Jairon Webb MD, 12/31/2020, 21:16 CST.      Electronically signed by Jairon Webb MD at 12/31/20 5180

## 2021-01-01 NOTE — THERAPY DISCHARGE NOTE
Acute Care - Physical Therapy Discharge Summary  [facility]       Patient Name: Lukasz Savage  : 1965  MRN: [id number]    Today's Date: 2021                 Admit Date: 2020      PT Recommendation and Plan    Visit Dx:    ICD-10-CM ICD-9-CM   1. Requires assistance with activities of daily living (ADL)  Z74.1 V49.89   2. 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 Generalized weakness  R53.1 780.79   4. Constipation, unspecified constipation type  K59.00 564.00   5. Oropharyngeal dysphagia  R13.12 787.22   6. Impaired mobility  Z74.09 799.89   7. Impaired mobility and ADLs  Z74.09 V49.89    Z78.9    8. 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    Putting on and taking off regular lower body clothing?  2  -TS      Bathing (including washing, rinsing, and drying)  2  -TS      Toileting (which includes using toilet bed pan or urinal)  2  -TS      Putting on and taking off regular upper body clothing  2  -TS      Taking care of personal grooming (such as brushing teeth)  3  -TS      Eating meals  3  -TS      AM-PAC 6 Clicks Score (OT)  14  -TS        User Key  (r) = Recorded By, (t) = Taken By, (c) = Cosigned By    Initials Name Provider Type    TS Enma Ellis, JEFF/L Occupational Therapy Assistant              Rehab Goal Summary     Row Name 21 1549 21 1500          Bed Mobility Goal 1 (PT)    Activity/Assistive Device (Bed Mobility Goal 1, PT)  rolling to left;rolling to right;sit to supine;supine to sit  -KJ  --     [place] Level/Cues Needed (Bed Mobility Goal 1, PT)  minimum assist (75% or more patient effort);tactile cues required;verbal cues required  -KJ  --     Time Frame (Bed Mobility Goal 1, PT)  long term goal (LTG);by discharge  -KJ  --     Progress/Outcomes (Bed Mobility Goal 1, PT)  goal not met  -KJ  --        Transfer Goal 1 (PT)     Activity/Assistive Device (Transfer Goal 1, PT)  sit-to-stand/stand-to-sit;bed-to-chair/chair-to-bed;walker, rolling  -KJ  --     [place] Level/Cues Needed (Transfer Goal 1, PT)  minimum assist (75% or more patient effort);tactile cues required;verbal cues required  -KJ  --     Time Frame (Transfer Goal 1, PT)  long term goal (LTG);by discharge  -KJ  --     Progress/Outcome (Transfer Goal 1, PT)  goal not met  -KJ  --        Gait Training Goal 1 (PT)    Activity/Assistive Device (Gait Training Goal 1, PT)  gait (walking locomotion);assistive device use;decrease fall risk;increase endurance/gait distance;walker, rolling  -KJ  --     [place] Level (Gait Training Goal 1, PT)  minimum assist (75% or more patient effort);tactile cues required;verbal cues required  -KJ  --     Distance (Gait Training Goal 1, PT)  25ft  -KJ  --     Time Frame (Gait Training Goal 1, PT)  long term goal (LTG);by discharge  -KJ  --     Progress/Outcome (Gait Training Goal 1, PT)  goal not met  -KJ  --        Dressing Goal 1 (OT)    Activity/Device (Dressing Goal 1, OT)  --  upper body dressing  -TS     [place]/Cues Needed (Dressing Goal 1, OT)  --  minimum assist (75% or more patient effort);set-up required;tactile cues required;verbal cues required  -TS     Time Frame (Dressing Goal 1, OT)  --  long term goal (LTG);by discharge  -TS     Progress/Outcome (Dressing Goal 1, OT)  --  goal not met  -TS        Grooming Goal 1 (OT)    Activity/Device (Grooming Goal 1, OT)  --  grooming skills, all  -TS     [place] (Grooming Goal 1, OT)  --  minimum assist (75% or more patient effort);tactile cues required;set-up required;verbal cues required  -TS     Time Frame (Grooming Goal 1, OT)  --  long term goal (LTG);by discharge  -TS     Progress/Outcome (Grooming Goal 1, OT)  --  goal not met  -TS        Self-Feeding Goal 1 (OT)    Activity/Device (Self-Feeding Goal 1, OT)  --  self-feeding skills, all  -TS     [place] Level/Cues  Needed (Self-Feeding Goal 1, OT)  --  minimum assist (75% or more patient effort);verbal cues required;tactile cues required;set-up required  -TS     Time Frame (Self-Feeding Goal 1, OT)  --  long term goal (LTG);by discharge  -TS     Progress/Outcomes (Self-Feeding Goal 1, OT)  --  goal not met  -TS        Oral Nutrition/Hydration Goal 1 (SLP)    Oral Nutrition/Hydration Goal 1, SLP  --  Pt will tolerate LRD without s/s of aspiration   -MB     Time Frame (Oral Nutrition/Hydration Goal 1, SLP)  --  short term goal (STG);by discharge  -MB     Barriers (Oral Nutrition/Hydration Goal 1, SLP)  --  n/a  -MB     Progress/Outcomes (Oral Nutrition/Hydration Goal 1, SLP)  --  goal partially met  -MB       User Key  (r) = Recorded By, (t) = Taken By, (c) = Cosigned By    Initials Name Provider Type Discipline    Daniela Brock, CCC-SLP Speech and Language Pathologist SLP    Trinity Hubbard, PTA Physical Therapy Assistant PT    TS Enma Ellis, AGUILAR/L Occupational Therapy Assistant OT              PT Discharge Summary  Anticipated Discharge Disposition (PT): skilled nursing facility  Reason for Discharge: Discharge from facility  Outcomes Achieved: Refer to plan of care for updates on goals achieved  Discharge Destination: SNF      Trinity Vargas PTA   1/1/2021

## 2021-01-01 NOTE — PLAN OF CARE
Goal Outcome Evaluation:  Plan of Care Reviewed With: patient  Progress: improving   Oriented to person, place, and situation. Confused. Hypertensive, PRN BP meds given with good relief. Room air. Home C Pap when patient is sleeping. No , pt pulls at lines. Refusing tele. Incontinent. Turning. Left shoulder x-ray today, due to c/o pain. Discharge to [facility] report called to RAMY Calles. [facility] notified of patient's discharge at 1837. Safety maintained.

## 2021-01-01 NOTE — THERAPY DISCHARGE NOTE
Acute Care - Speech Language Pathology Discharge Summary  [facility]       Patient Name: Lukasz Savage  : 1965  MRN: [id number]    Today's Date: 2021                   Admit Date: 2020      SLP Recommendation and Plan  Mechanical soft solids and thin liquids    Visit Dx:    ICD-10-CM ICD-9-CM   1. Requires assistance with activities of daily living (ADL)  Z74.1 V49.89   2. 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 Generalized weakness  R53.1 780.79   4. Constipation, unspecified constipation type  K59.00 564.00   5. Oropharyngeal dysphagia  R13.12 787.22   6. Impaired mobility  Z74.09 799.89   7. Impaired mobility and ADLs  Z74.09 V49.89    Z78.9    8. 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               SLP GOALS     Row Name 21 1500 20 0937          Oral Nutrition/Hydration Goal 1 (SLP)    Oral Nutrition/Hydration Goal 1, SLP  Pt will tolerate LRD without s/s of aspiration   -MB  Pt will tolerate LRD without s/s of aspiration   -MM     Time Frame (Oral Nutrition/Hydration Goal 1, SLP)  short term goal (STG);by discharge  -MB  short term goal (STG);by discharge  -MM     Barriers (Oral Nutrition/Hydration Goal 1, SLP)  n/a  -MB  n/a  -MM     Progress/Outcomes (Oral Nutrition/Hydration Goal 1, SLP)  goal partially met  -MB  continuing progress toward goal  -MM       User Key  (r) = Recorded By, (t) = Taken By, (c) = Cosigned By    Initials Name Provider Type    Daniela Brock, CCC-SLP Speech and Language Pathologist    Silvina Lopez, MS CCC-SLP Speech and Language Pathologist                  SLP Discharge Summary  Anticipated Discharge Disposition (SLP): inpatient rehabilitation facility  Reason for Discharge: discharge from this facility  Progress Toward Achieving Short/long Term Goals: goals partially met within established timelines  Discharge Destination: SNF      Daniela Heck CCC-SLP  2021

## 2021-01-01 NOTE — THERAPY DISCHARGE NOTE
Acute Care - Occupational Therapy Discharge Summary  [facility]     Patient Name: Lukasz Savage  : 1965  MRN: [id number]    Today's Date: 2021                 Admit Date: 2020        OT Recommendation and Plan    Visit Dx:    ICD-10-CM ICD-9-CM   1. Requires assistance with activities of daily living (ADL)  Z74.1 V49.89   2. 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 Generalized weakness  R53.1 780.79   4. Constipation, unspecified constipation type  K59.00 564.00   5. Oropharyngeal dysphagia  R13.12 787.22   6. Impaired mobility  Z74.09 799.89   7. Impaired mobility and ADLs  Z74.09 V49.89    Z78.9    8. 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               Rehab Goal Summary     Row Name 21 1500             Dressing Goal 1 (OT)    Activity/Device (Dressing Goal 1, OT)  upper body dressing  -TS      [place]/Cues Needed (Dressing Goal 1, OT)  minimum assist (75% or more patient effort);set-up required;tactile cues required;verbal cues required  -TS      Time Frame (Dressing Goal 1, OT)  long term goal (LTG);by discharge  -TS      Progress/Outcome (Dressing Goal 1, OT)  goal not met  -TS         Grooming Goal 1 (OT)    Activity/Device (Grooming Goal 1, OT)  grooming skills, all  -TS      [place] (Grooming Goal 1, OT)  minimum assist (75% or more patient effort);tactile cues required;set-up required;verbal cues required  -TS      Time Frame (Grooming Goal 1, OT)  long term goal (LTG);by discharge  -TS      Progress/Outcome (Grooming Goal 1, OT)  goal not met  -TS         Self-Feeding Goal 1 (OT)    Activity/Device (Self-Feeding Goal 1, OT)  self-feeding skills, all  -TS      [place] Level/Cues Needed (Self-Feeding Goal 1, OT)  minimum assist (75% or more patient effort);verbal cues required;tactile cues required;set-up required  -TS      Time Frame (Self-Feeding Goal 1, OT)  long term goal (LTG);by discharge  -TS      Progress/Outcomes (Self-Feeding Goal 1, OT)   goal not met  -TS        User Key  (r) = Recorded By, (t) = Taken By, (c) = Cosigned By    Initials Name Provider Type Discipline    Enma Richards COTA/L Occupational Therapy Assistant OT          Outcome Measures     Row Name 12/31/20 1300             How much help from another is currently needed...    Putting on and taking off regular lower body clothing?  2  -TS      Bathing (including washing, rinsing, and drying)  2  -TS      Toileting (which includes using toilet bed pan or urinal)  2  -TS      Putting on and taking off regular upper body clothing  2  -TS      Taking care of personal grooming (such as brushing teeth)  3  -TS      Eating meals  3  -TS      AM-PAC 6 Clicks Score (OT)  14  -TS        User Key  (r) = Recorded By, (t) = Taken By, (c) = Cosigned By    Initials Name Provider Type    Enma Richards COTA/L Occupational Therapy Assistant          Timed Therapy Charges  Total Units: 3    Charges  Total Units: 3    Procedure Name Documented Minutes Units Code    HC OT SELF CARE/MGMT/TRAIN EA 15 MIN 44  3    31133 (CPT®)               Documented Minutes  Total Minutes: 44    Therapy Provided Minutes    60959 - OT Self Care/Mgmt Minutes 44                Therapy Charges for Today     Code Description Service Date Service Provider Modifiers Qty    16763976103 HC OT SELF CARE/MGMT/TRAIN EA 15 MIN 12/31/2020 Enma Ellis COTA/L GO 3          OT Discharge Summary  Anticipated Discharge Disposition (OT): skilled nursing facility  Reason for Discharge: Discharge from facility  Outcomes Achieved: Refer to plan of care for updates on goals achieved  Discharge Destination: SNF      CHRISTAL Burrell  1/1/2021

## 2021-01-04 ENCOUNTER — HOSPITAL ENCOUNTER (OUTPATIENT)
Dept: INFUSION THERAPY | Age: 56
End: 2021-01-04
Payer: MEDICAID

## 2021-01-11 NOTE — TELEPHONE ENCOUNTER
Ivonne said Lukasz started declining the first week of December and has become progressively worse since.  He was unable to stand and talked to people that weren't there.  She thought he had a stroke and took him to ER.  He is in [facility] for rehab.  She has observed his right arm shaking and a family member noticed his face drawing.  He has had several falls at the nursing home and is confused.  She wants to know what Galen suggests.  Contacted [facility], notified Galen ordered lab and EEG, lab orders faxed.  They said he doesn't have to quarantine after a doctor visit.  Ivonne notified Galen ordered an EEG and lab.

## 2021-01-11 NOTE — TELEPHONE ENCOUNTER
Caller: MARLIN    Relationship to patient: MOTHER    Best call back number: [phone]    Chief complaint: PT IS GETTING WORSE. MARLIN STATES PT IS CURRENTLY IN REHAB FROM A RECENT HOSP STAY BUT STATES HE IS IN NEED OF AN APPT WITH LISETH BANEGAS. SHE STATES HE IS FALLING OUT OF BED AND HIS WHEELCHAIR AND SEEMS TO BE HALLUCINATING. HE WENT TO THE HOSP Monday AFTER LUCRECIA AND THE HALLUCINATIONS STARTED BEFORE LUCRECIA. SHE THINKS IT COULD BE MEDICATION RELATED AND WOULD LIKE AN APPT TO DISCUSS. SHE STATES HIS FACE ALSO INTERMITTENTLY JERKING AND HIS ARMS INTERMITTENTLY TWITCH. SHE STATES THE ARM TWITCHING WAS NOTICED IN THE HOSP AND THE FACE JERKING HAS BEEN NOTICED OVER THE LAST FEW DAYS . MARLIN WOULD LIKE THE NURSE TO GIVE HER A CALL BACK TO DISCUSS PT 'S ISSUES AS SHE STATES THEY SEEM TO BE GETTING WORSE.     Type of visit: FOLLOW UP    Requested date: ASAP    If rescheduling, when is the original appointment: 1/20/21      Additional notes: PLEASE ADVISE MARLIN IF SOONER APPT CAN BE MADE FOR THE PT AND CALL HER BACK PER REQUEST AS SHE WOULD LIKE TO SPEAK TO THE NURSE

## 2021-01-18 NOTE — TELEPHONE ENCOUNTER
Ivonne notified I called and talked to her earlier, we haven't tried to contact her since.  She said the nursing home told her Lukasz fell this morning, he c/o pain in his left hip, head, and left arm.  He had tested positive for COVID and they were going to move him to another facility with a COVID wing.  He was unable to hold his head up after they put him in a w/c and was having difficulty breathing so they sent him to ER.

## 2021-01-18 NOTE — TELEPHONE ENCOUNTER
DELETE AFTER REVIEWING: Telephone encounter to be sent to the clinical pool.    Caller: MARLIN WAHL    Relationship: MOTHER    Best call back number: [phone]    Caller requesting test results: MOTHER    What test was performed: EEG    When was the test performed: 1/15/21    Where was the test performed: [facility]    Additional notes: PT'S UPCOMING F/U ON 1/20/21 HAS BEEN CANCELLED DUE TO POSITIVE COVID-19 TEST. PT'S MOTHER TO CALL BACK TO RESCHEDULE WHEN READY. SHE HAS ALSO REQUESTED EEG RESULTS TO BE READ AT EARLIEST CONVENIENCE.        DELETE AFTER READING TO PATIENT: “Thank you for sharing this information with me. I will send a message to the clinical team. Please allow 48 hours for the clinical staff to follow up on this request.”

## 2021-01-18 NOTE — TELEPHONE ENCOUNTER
MARLIN WAHL, PT'S MOTHER CALLED BACK STATING SHE RECEIVED A CALL FROM THE OFFICE AT 2:38 AND SHE THINKS IT'S REGARDING EEG RESULTS. I DIDN'T SEE DOCUMENTATION OF THE CALL IN PT'S CHART. PLEASE CALL HER AT EARLIE[facility] CONVENIENCE.    CALL BACK- [phone]

## 2021-01-18 NOTE — ED PROVIDER NOTES
Subjective   History of Present Illness    Patient is a 56-year-old male presenting to ED via EMS from [facility] with a fall.  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    Patient reports that earlier today he got up and was walking at which time he had a mechanical trip and fall over his feet on a set of stairs landing on the ground hitting his posterior head, posterior cervical spine, as well as left side.  Patient reports that he had just to do from his wheelchair and was ambulating and denies any preceding symptoms to the fall including chest pain, dizziness, lightheadedness, or syncope.  Patient reports he has a history of brain cancer and intermittently is confused.  Patient reports at this time the most significant pain is to his posterior head, neck, as well as his left hip.  Patient noted that he is currently being treated for Covid but denies any new or changes to his illness.  Patient denies any visual changes or tinnitus.  Patient notes pain is well to his left lateral lower ribs as well as left upper quadrant abdomen.  Patient denies any nausea, vomiting.  Patient notes chronic left-sided weakness but denies any changes since his fall.    Patient was given no interventions prior to arrival of EMS.    Patient has no medication allergies to Lipitor, lisinopril, pregabalin, allopurinol.  Patient has no documented blood thinners.  Patient has a medical history significant for hyperlipidemia, arthritis, erectile dysfunction, previous malignant hypertension, GERD, gout, seizures, obstructive sleep apnea, brain cancer, history of drug-induced hepatic toxicity, anxiety, history of C. difficile.  Patient is a surgical history positive for craniotomy with right-sided biopsy on 4/15/2019, colonoscopy, left shoulder arthroscopy, tracheostomy tube placement in 2017.  Patient is a former cigarette smoker with no current use  of tobacco products.  Patient denies use of alcohol or any IV/recreational/illicit drugs.    Records reviewed show patient was admitted from 12/28/2020 until 12/31/2024 requires assistance with ADLs, inoperable anaplastic glioma of brain, moderate malnutrition, obstructive sleep apnea, AMS, left hemiparesis, seizure disorder, poorly controlled hypertension.  During this admission patient had a head CT on 12/20/2020 which showed: No acute intracranial findings, stable posttreatment change in the right frontal lobe with unchanged area of right frontal parenchymal calcification.  Patient was noted to have left-sided weakness, poorly controlled seizures, poorly controlled hypertension.  Patient was transferred on 12/31/2022 [place] for ongoing physical therapy, Occupational Therapy, speech therapy.    Review of Systems   HENT: Negative for tinnitus.    Eyes: Negative.  Negative for photophobia and visual disturbance.   Respiratory: Negative.  Negative for shortness of breath.    Cardiovascular: Positive for chest pain (Left lower lateral chest wall).   Gastrointestinal: Positive for abdominal pain (LUQ). Negative for nausea and vomiting.   Genitourinary: Negative.  Negative for hematuria.   Musculoskeletal: Positive for arthralgias (left hip) and neck pain. Negative for back pain.   Skin: Negative.  Negative for wound.   Neurological: Positive for weakness (left sided, chronic) and headaches (posterior). Negative for dizziness, syncope and light-headedness.        Reports + head injury  Denies LOC   Psychiatric/Behavioral: Positive for confusion (intermitent, chronic due to known glimoa).   All other systems reviewed and are negative.      Past Medical History:   Diagnosis Date   • Anemia 6/17/2019   • Angio-edema 7/3/2017   • Anxiety    • Arthritis    • Cancer (CMS/HCC) 05/09/2019    Brain cancer diagnoised yesterday  Dr Trevino    • Clostridium difficile colitis    • Drug-induced hepatic toxicity 9/6/2019   •  Erectile dysfunction 10/6/2016   • GERD (gastroesophageal reflux disease)    • Gout    • HCAP (healthcare-associated pneumonia) 7/11/2017   • Hyperlipidemia    • Malignant hypertension    • MSSA (methicillin susceptible Staphylococcus aureus) infection 7/31/2017   • Obstructive sleep apnea    • S/P shoulder surgery 7/27/2018   • Seizures (CMS/HCC) 7/4/2017   • 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 9/6/2019       Allergies   Allergen Reactions   • Lipitor [Atorvastatin] Rash   • Lisinopril Angioedema     Swell tongue   • Pregabalin Mental Status Change and Other (See Comments)   • Allopurinol Unknown - High Severity       Past Surgical History:   Procedure Laterality Date   • COLONOSCOPY     • CRANIOTOMY Right 4/15/2019    Procedure: CRANIOTOMY WITH BIOPSY RIGHT;  Surgeon: Dillon Trevino MD;  Location: [facility] OR;  Service: Neurosurgery   • SHOULDER ARTHROSCOPY Left    • TRACHEOSTOMY N/A 7/12/2017    Procedure: TRACHEOSTOMY WITH TRACHEOSCOPY;  Surgeon: Jairon Pierson MD;  Location:  [facility] OR;  Service:        Family History   Problem Relation Age of Onset   • Hypertension Mother    • Diabetes Mother    • Heart disease Father    • Hypertension Father    • No Known Problems Daughter    • No Known Problems Son    • Diabetes Maternal Grandmother    • No Known Problems Maternal Grandfather    • No Known Problems Paternal Grandmother    • Heart attack Paternal Grandfather    • Kidney disease Sister        Social History     Socioeconomic History   • Marital status: Single     Spouse name: Not on file   • Number of children: 2   • Years of education: Not on file   • Highest education level: Not on file   Occupational History   • Occupation: ELECTRIAL WORK   Tobacco Use   • Smoking status: Former Smoker     Packs/day: 0.33     Years: 30.00     Pack years: 9.90     Types: Cigarettes     Quit date: 7/3/2017     Years since quitting: 3.5   • Smokeless tobacco: Never Used   Substance and Sexual Activity   • Alcohol use: No      Frequency: Never     Comment: used to drink alot but now just ocasional beer since has seizure in 2017   • Drug use: No   • Sexual activity: Defer           Objective   Physical Exam  Vitals signs and nursing note reviewed.   Constitutional:       Appearance: Normal appearance. He is overweight. He is not diaphoretic.   HENT:      Head: Normocephalic and atraumatic. No abrasion, contusion or laceration.      Jaw: There is normal jaw occlusion.      Comments: Reproducible tenderness to palpitation diffusely of occipital region with no overlying abrasion, contusion, lacerations, or other signs of injury      Mouth/Throat:      Mouth: Mucous membranes are dry.      Pharynx: Oropharynx is clear.   Eyes:      General: No scleral icterus.     Extraocular Movements: Extraocular movements intact.      Conjunctiva/sclera: Conjunctivae normal.      Pupils: Pupils are equal, round, and reactive to light.   Cardiovascular:      Rate and Rhythm: Normal rate and regular rhythm.      Heart sounds: Normal heart sounds.   Pulmonary:      Effort: Pulmonary effort is normal. No tachypnea, respiratory distress or retractions.      Breath sounds: Decreased breath sounds present. No wheezing, rhonchi or rales.   Chest:      Chest wall: Tenderness (Reproducible tenderness to palpitation of the left lower lateral chest wall with no overlying evidence of injury including no abrasions, no bruising, no contusions) present.   Abdominal:      General: Bowel sounds are normal. There are no signs of injury.      Palpations: Abdomen is soft.      Tenderness: There is abdominal tenderness in the left upper quadrant. There is no guarding.   Musculoskeletal:      Cervical back: He exhibits tenderness and bony tenderness.   Skin:     General: Skin is warm and dry.      Findings: Abrasion (old, healing superficial abrasion to right mid lateral calf) present. No bruising, signs of injury or laceration.   Neurological:      Mental Status: He is alert and  oriented to person, place, and time. Mental status is at baseline. He is confused.      GCS: GCS eye subscore is 4. GCS verbal subscore is 5. GCS motor subscore is 6.      Comments: Patient is alert and oriented x3 and answers most questions appropriately with intermittent statements of confusion. Left-sided extremities slightly weaker than right sided extremities.   Psychiatric:         Attention and Perception: Attention normal.         Mood and Affect: Mood and affect normal.         Speech: Speech normal.         Behavior: Behavior normal. Behavior is cooperative.         Procedures           ED Course  ED Course as of Jan 19 0024   Mon Jan 18, 2021   1513 XR pending dictation.     Remainder of work up pending collection.     [JS]   1525 Left hip XR shows: Negative left hip.    [JS]   1537 Labs pending.     [JS]   1544 CXR shows: Nondisplaced lateral left seventh rib fracture. Associated atelectasis in the left lung base.     [JS]   1548 CBC with no leukocytosis/leukopenia.  H&H stable.  Thrombocytopenia 103.Differential pending.    [JS]   1613 Valproic acid decreased at 30.7. Four days ago level was adequate at 106.9.    Manual differential with decreased relative of 16.2%, elevated relative monocytes 17.5.  Elevated relative bands at 21.6%.    CMP with elevated chloride of 112 and no other acute findings.  Normal renal functions, normal LFTs.  No anion gap.Urinalysis pending collection.    CT imaging continues to be pending transportation to department.    [JS]   1645 CT continues to be pending transportation to department.     [JS]   1717 Urine studies continue to be pending collection.     CT continue to be pending transportation to department.     [JS]   1744 CT continues to be pending transportation to department.     [JS]   1828 Urine studies continue to be pending collection.     CT continue to be pending transportation to department.       [JS]   1841 Patient in CT at this time.     [JS]   1933 Urine  studies with trace leukocytes, 2 urobilinogen, nitrite negative. Trace protein, 0-2 RBC, 0-2 WBC, no bacteria, 0-2 squamous epithelium.    CT imaging pending dictation.    [JS]   2014 Head CT shows: No acute intracranial findings. Similar postoperative changes, chronic microvascular changes and volume loss.    Cervical spine CT shows: No acute fracture. Degenerative changes as above.    Thoracic spine CT shows: No convincing acute fracture or malalignment. Similar minimal retrolisthesis T12 and L1 compared to 6/26/2020. Subacute appearing fractures of the left ninth, 10th and 11th ribs. Chronic appearing left posterior rib fracture.     Chest CT shows: 1. Left basilar opacity, which could represent at infection, aspiration or atelectasis.  2. Subacute appearing fractures of the left anterolateral fifth and  sixth ribs. Subacute appearing left ninth, 10th, 11th posterior medial rib fractures. Chronic appearing left 12th rib fracture. See separately dictated CT thoracic spine of the same day.  3. Scattered coronary artery calcifications.    [JS]   2031 Abd/pel CT shows: 1. No acute intra-abdominal finding.  2. Large amount of stool at the rectum. Colonic diverticula without evidence of acute diverticulitis.  3. Fat-containing umbilical and small bilateral inguinal hernias.  4. Subacute and chronic appearing left posterior rib fractures.  5. See separately dictated reports of the same day.    Lumbar CT shows: 1. No acute fracture. Multilevel degenerative changes as above.  2. See separately dictated CT abdomen and pelvis of the same day.    [JS]   2100 Discussed case at this time with Dr. Bruce Holliday.  In the setting of no seizure activity Dr. Holliday does not advise any further valproic acid.  Reports the patient is safe for return to [place] at this time with continued incentive spirometer use as well as close follow-up.  No further recommendations.    [JS]      ED Course User Index  [JS] Mervin Cantu,  JOSHUA                                           [facility]  Number of Diagnoses or Management Options  Closed fracture of multiple ribs of both sides, initial encounter:   Constipation, unspecified constipation type:   Fall, initial encounter:   Umbilical hernia without obstruction and without gangrene:      Amount and/or Complexity of Data Reviewed  Clinical lab tests: reviewed and ordered  Tests in the radiology section of CPT®: reviewed and ordered  Tests in the medicine section of CPT®: ordered and reviewed  Decide to obtain previous medical records or to obtain history from someone other than the patient: yes  Review and summarize past medical records: yes  Discuss the patient with other providers: yes (Dr. Holliday (attending))    Patient Progress  Patient progress: stable      Final diagnoses:   Fall, initial encounter   Constipation, unspecified constipation type   Closed fracture of multiple ribs of both sides, initial encounter   Umbilical hernia without obstruction and without gangrene            Mervin Cantu PA-C  01/19/21 0024

## 2021-01-19 NOTE — ED NOTES
PT INCONTINENT OF STOOL.  PT CLEANED, NEW BRIEF, LINEN, AND GOWN PLACED BEFORE EMS DEPARTURE.      Drew Charles RN  01/18/21 3064

## 2021-01-19 NOTE — ED NOTES
PT INCONTINENT OF URINE, PT CLEANED,  NEW BRIEF AND LINES REPLACED.  PT AGAIN REPOSITIONED AND SEIZURE PADS SECURED.       Drew Charles, RN  01/18/21 2207       Drew Charles RN  01/18/21 2207

## 2021-01-19 NOTE — ED NOTES
LILIANE WAHL, DAUGHTER UPDATED OF PATIENTS STATUS/ DISPOSITION.     Drew Charles, RN  01/18/21 1943

## 2021-01-19 NOTE — ED NOTES
[facility] EMS RETURNED CALL.  PT TO BE TRANSPORTED TO [facility].     Drew Charles RN  01/18/21 9778

## 2021-01-19 NOTE — ED NOTES
[facility] EMS NOTIFIED OF RETURN TRANSPORT TO [facility].      Drew Charles, RN  01/18/21 2327

## 2021-01-19 NOTE — DISCHARGE INSTRUCTIONS
Today you were noted to have new fractures of the left side of your ribs.  Please make sure that you are continuing to use the incentive spirometer every hour to help prevent further complications such as pneumonia.  Please continue using your pain medication of close follow with your primary care provider for reevaluation and discussion of pain management associated with these fractures.  Today your valproic acid was noted to be lower than it should be.  Continue to follow-up with your primary care provider to have this rechecked to assure that you are at adequate levels and do not set yourself up for a seizure.

## 2021-01-19 NOTE — ED NOTES
REPORT GIVEN TO JOSUE [facility] WHO REPORTS THE PATIENT HAD PREVIOUSLY BEEN ACCEPTED TO [facility] BEFORE BEING TRANSPORTED TO THIS FACILITY THIS DATE.  JOSUE IS TO RETURN A CALL WITH CONTACT INFORMATION AND BED OF ACCEPTANCE .     Drew Charles, RN  01/18/21 5677

## 2021-01-19 NOTE — TELEPHONE ENCOUNTER
Ivonne notified Galen reviewed the lab and EEG results.  The lab results were OK, Vimpat level is pending, the EEG was normal.

## 2021-01-19 NOTE — ED NOTES
LILIANE AWARE OF PTS DISCHARGE TO [facility].  DAUGHTER PROCLAIMED DISCONTENT WITH PT NOT BEING ADMITTED BECAUSE OF HIS CONFUSION, DESPITE ER EVALUATION AND WORKUP      Drew Charles, RN  01/18/21 2245       Drew Charles, RN  01/18/21 2246       Drew Charles, RN  01/18/21 2242

## 2021-01-29 NOTE — TELEPHONE ENCOUNTER
"I called the patient's daughter back and told her I could put an order in for home health to come in and evaluate him but I could not guarantee when that would be & that we do not \"get approval\" for home health.  She also stated that his insurance said they would not approve home health until his doctor called the nursing home and requested he be discharged from the facility.  I explained to her that we do not have any control of the nursing home and the orders given there as far as discharge.  I told her she would have to discuss this with the facility and talk to them about their protocol.    I will place the home health order for Dr Trevino to sign today.        raine quarles CMA  "

## 2021-01-29 NOTE — TELEPHONE ENCOUNTER
Caller: LILIANE NEUMANN    Relationship: DAUGHTER    Best call back number: [phone]    What orders are you requesting (i.e. lab or imaging): HOME HEALTH    In what timeframe would the patient need to come in: AFTER Monday 2/1/21    Where will you receive your lab/imaging services: ANY    Additional notes: PATIENT IS CURRENTLY IN A NURSING HOME DAUGHTER WOULD LIKE ORDERS FOR HOME HEALTH TO GET HIM OUT OF THE NURSING HOME. PATIENT CONTRACTED COVID 19 WHILE IN THE NURSING HOME, HE IS CURRENTLY QUARATINING AND CAN NOT LEAVE UNTIL MONDAY. DAUGHTER WOULD LIKE TO BE CALLED BACK @ [phone] ASAP REGARDING GETTING APPROVAL FOR HOME HEALTH.

## 2021-02-02 ENCOUNTER — CLINICAL DOCUMENTATION (OUTPATIENT)
Dept: HEMATOLOGY | Age: 56
End: 2021-02-02

## 2021-02-02 NOTE — PROGRESS NOTES
Spoke with VLADIMIR Caba with [address] and rehab related to [facility]. He is currently a resident at [facility] after being hospitalized at [facility] from 12/28/2020 to 12/31/2020 for altered mental status, decline performance status, and left hemiparesis. He required a second trip to the emergency room after falling at [facility] and was identified with bilateral rib close fractures. He has also been in isolation on the COVID-19 unit and is now in a regular room as of 2/1/2021. [place] expressed that the family was having a difficult time with his current prognosis and current condition, she requested a follow-up appointment in clinic. Appointment was made for 2/5/2021.

## 2021-02-03 NOTE — TELEPHONE ENCOUNTER
Caller: [facility]    Relationship to patient:  FACILITY     Best call back number: [phone]    New or established patient?  [] New  [x] Established    Date of discharge:2.5.21    Facility discharged from: [facility]

## 2021-02-04 NOTE — OUTREACH NOTE
Prep Survey      Responses   [facility] facility patient discharged from?  Non-BH   Is LACE score < 7 ?  Non-BH Discharge   Emergency Room discharge w/ pulse ox?  No   Eligibility  [facility]   Date of Discharge  02/05/21   Discharge diagnosis  unknown   Does the patient have one of the following disease processes/diagnoses(primary or secondary)?  Other   Prep survey completed?  Yes          Kiki Alfaro RN         n/a

## 2021-02-05 ENCOUNTER — HOSPITAL ENCOUNTER (OUTPATIENT)
Dept: INFUSION THERAPY | Age: 56
Discharge: HOME | End: 2021-02-05
Payer: MEDICAID

## 2021-02-05 ENCOUNTER — OFFICE VISIT (OUTPATIENT)
Dept: HEMATOLOGY | Age: 56
End: 2021-02-05
Payer: MEDICAID

## 2021-02-05 VITALS
OXYGEN SATURATION: 95 % | DIASTOLIC BLOOD PRESSURE: 86 MMHG | HEART RATE: 115 BPM | TEMPERATURE: 96.9 F | BODY MASS INDEX: 25.37 KG/M2 | SYSTOLIC BLOOD PRESSURE: 122 MMHG | WEIGHT: 167.4 LBS | HEIGHT: 68 IN

## 2021-02-05 DIAGNOSIS — T45.1X5A ANEMIA ASSOCIATED WITH CHEMOTHERAPY: ICD-10-CM

## 2021-02-05 DIAGNOSIS — G89.3 CANCER ASSOCIATED PAIN: ICD-10-CM

## 2021-02-05 DIAGNOSIS — T45.1X5D ADVERSE EFFECT OF CHEMOTHERAPY, SUBSEQUENT ENCOUNTER: ICD-10-CM

## 2021-02-05 DIAGNOSIS — Z51.11 CHEMOTHERAPY MANAGEMENT, ENCOUNTER FOR: ICD-10-CM

## 2021-02-05 DIAGNOSIS — D64.81 ANEMIA ASSOCIATED WITH CHEMOTHERAPY: ICD-10-CM

## 2021-02-05 DIAGNOSIS — Z71.89 CARE PLAN DISCUSSED WITH PATIENT: ICD-10-CM

## 2021-02-05 DIAGNOSIS — T45.1X5A CHEMOTHERAPY INDUCED NAUSEA AND VOMITING: ICD-10-CM

## 2021-02-05 DIAGNOSIS — C71.9 ANAPLASTIC GLIOMA OF BRAIN (HCC): ICD-10-CM

## 2021-02-05 DIAGNOSIS — G89.3 CANCER ASSOCIATED PAIN: Primary | ICD-10-CM

## 2021-02-05 DIAGNOSIS — I10 ESSENTIAL HYPERTENSION: ICD-10-CM

## 2021-02-05 DIAGNOSIS — C71.9 ANAPLASTIC GLIOMA OF BRAIN (HCC): Primary | ICD-10-CM

## 2021-02-05 DIAGNOSIS — Z74.3 REQUIRES CONTINUOUS SUPERVISION FOR ACTIVITIES OF DAILY LIVING (ADL): ICD-10-CM

## 2021-02-05 DIAGNOSIS — R11.2 CHEMOTHERAPY INDUCED NAUSEA AND VOMITING: ICD-10-CM

## 2021-02-05 DIAGNOSIS — R56.9 SEIZURES (HCC): ICD-10-CM

## 2021-02-05 DIAGNOSIS — R53.83 FATIGUE DUE TO TREATMENT: ICD-10-CM

## 2021-02-05 LAB
ALBUMIN SERPL-MCNC: 3.4 G/DL (ref 3.5–5.2)
ALP BLD-CCNC: 62 U/L (ref 40–130)
ALT SERPL-CCNC: 43 U/L (ref 21–72)
ANION GAP SERPL CALCULATED.3IONS-SCNC: 9 MMOL/L (ref 7–19)
AST SERPL-CCNC: 32 U/L (ref 17–59)
BASOPHILS ABSOLUTE: 0.03 K/UL (ref 0.01–0.08)
BASOPHILS RELATIVE PERCENT: 0.4 % (ref 0.1–1.2)
BILIRUB SERPL-MCNC: 0.5 MG/DL (ref 0.2–1.3)
BUN BLDV-MCNC: 10 MG/DL (ref 9–20)
CALCIUM SERPL-MCNC: 9 MG/DL (ref 8.4–10.2)
CHLORIDE BLD-SCNC: 114 MMOL/L (ref 98–111)
CO2: 23 MMOL/L (ref 22–29)
CREAT SERPL-MCNC: 0.6 MG/DL (ref 0.6–1.2)
EOSINOPHILS ABSOLUTE: 0.09 K/UL (ref 0.04–0.54)
EOSINOPHILS RELATIVE PERCENT: 1.3 % (ref 0.7–7)
GFR NON-AFRICAN AMERICAN: >60
GLOBULIN: 2.8 G/DL
GLUCOSE BLD-MCNC: 122 MG/DL (ref 74–106)
HCT VFR BLD CALC: 36.1 % (ref 40.1–51)
HEMOGLOBIN: 12.6 G/DL (ref 13.7–17.5)
LYMPHOCYTES ABSOLUTE: 1.89 K/UL (ref 1.18–3.74)
LYMPHOCYTES RELATIVE PERCENT: 28.3 % (ref 19.3–53.1)
MCH RBC QN AUTO: 32.6 PG (ref 25.7–32.2)
MCHC RBC AUTO-ENTMCNC: 34.9 G/DL (ref 32.3–36.5)
MCV RBC AUTO: 93.3 FL (ref 79–92.2)
MONOCYTES ABSOLUTE: 1.31 K/UL (ref 0.24–0.82)
MONOCYTES RELATIVE PERCENT: 19.6 % (ref 4.7–12.5)
NEUTROPHILS ABSOLUTE: 3.36 K/UL (ref 1.56–6.13)
NEUTROPHILS RELATIVE PERCENT: 50.4 % (ref 34–71.1)
PDW BLD-RTO: 15.1 % (ref 11.6–14.4)
PLATELET # BLD: 358 K/UL (ref 163–337)
PMV BLD AUTO: 9.3 FL (ref 7.4–10.4)
POTASSIUM SERPL-SCNC: 3.5 MMOL/L (ref 3.5–5.1)
RBC # BLD: 3.87 M/UL (ref 4.63–6.08)
SODIUM BLD-SCNC: 146 MMOL/L (ref 137–145)
TOTAL PROTEIN: 6.3 G/DL (ref 6.3–8.2)
WBC # BLD: 6.68 K/UL (ref 4.23–9.07)

## 2021-02-05 PROCEDURE — 80053 COMPREHEN METABOLIC PANEL: CPT

## 2021-02-05 PROCEDURE — 99214 OFFICE O/P EST MOD 30 MIN: CPT | Performed by: NURSE PRACTITIONER

## 2021-02-05 PROCEDURE — 85025 COMPLETE CBC W/AUTO DIFF WBC: CPT

## 2021-02-05 PROCEDURE — 99211 OFF/OP EST MAY X REQ PHY/QHP: CPT

## 2021-02-05 RX ORDER — ALPRAZOLAM 1 MG/1
1 TABLET ORAL ONCE
Qty: 1 TABLET | Refills: 0 | Status: SHIPPED | OUTPATIENT
Start: 2021-02-05 | End: 2021-02-05

## 2021-02-05 RX ORDER — OXYCODONE AND ACETAMINOPHEN 10; 325 MG/1; MG/1
1 TABLET ORAL EVERY 4 HOURS PRN
Qty: 120 TABLET | Refills: 0 | Status: SHIPPED | OUTPATIENT
Start: 2021-02-05 | End: 2021-03-07

## 2021-02-05 RX ORDER — DEXAMETHASONE 4 MG/1
4 TABLET ORAL 2 TIMES DAILY WITH MEALS
Qty: 60 TABLET | Refills: 0 | Status: SHIPPED | OUTPATIENT
Start: 2021-02-05 | End: 2021-03-07

## 2021-02-05 ASSESSMENT — ENCOUNTER SYMPTOMS
SHORTNESS OF BREATH: 0
EYES NEGATIVE: 1
NAUSEA: 0
WHEEZING: 0
EYE PAIN: 0
SORE THROAT: 0
GASTROINTESTINAL NEGATIVE: 1
EYE REDNESS: 0
COUGH: 0
BACK PAIN: 0
BLOOD IN STOOL: 0
DIARRHEA: 0
CONSTIPATION: 0
VOMITING: 0
ABDOMINAL PAIN: 0
RESPIRATORY NEGATIVE: 1
EYE DISCHARGE: 0

## 2021-02-05 NOTE — PROGRESS NOTES
Progress Note      Pt Name: Anne Ruiz  YOB: 1965  MRN: [id number]    Date of evaluation: 2/5/2021  History Obtained From:  patient, electronic medical record    CHIEF COMPLAINT:    Chief Complaint   Patient presents with    Follow-up     Anaplastic glioma of brain ([facility] [address]     HISTORY OF PRESENT ILLNESS:    Anne Ruiz is a 55 y.o.  male with a significant past medical history of anaplastic glioma, diagnosed April 2019 and placed on palliative Temodar on 8/29/2019. Marquis Quan has been taking Temodar 150 mg/m² days 1 through 5 every 28 days with a continued positive response and overall tolerating treatment well. He is routinely followed by Dr. Jud Denver with MRI of the brain every 3 months, last completed on 12/7/2020 that continue to show no convincing evidence of recurrent disease with stable post therapeutic changes of the right posterior frontal lobe. Marquis Quan was last seen in clinic on 12/4/2020 with continued decline in weakness typically to the right side. He returns today for follow-up for evaluation, side effect monitoring, lab monitoring and treatment recommendations. Ms. Marlin Kelly mother who is his primary caregiver is accompanying him today and she reports he last had Temodar in January and his next dose is due on 2/20/2021. On 12/28/2020, Feliz Richards presented to [facility] emergency room with worsening confusion/altered mental status and decreased functional status. CT scan of the head on 12/28/2020 documented no acute intracranial findings, stable posttreatment change in the right frontal lobe with unchanged area of right frontal parenchymal calcification. He was evaluated by Dr. Jake Olvera who suggested either microscopic progression of disease that is not visible on MRI or long-term effects of radiation treatment. It was felt that he would benefit from physical rehabilitation and he was discharged on 12/31/2022 [facility] and rehab. Unfortunately while at [facility] he was diagnosed with COVID-19 requiring isolation, he was discharged from [facility] to home with home health earlier today. Feliz Richards continues to have significant weakness especially to his right side. He has a weight loss of 33 pounds over the past 8 weeks. Muscle wasting is noted and he has several lesions to his left elbow, reports obtain those from falls at [facility]. I reviewed the above findings with Dr. Meena De Paz and he agreed with holding treatment, repeating MRI of the brain and initiating dexamethasone 4 mg twice daily. Concern for microscopic progression of disease is warranted. CBC was reviewed today, is in within acceptable limits and is as documented below. 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    · 8/29/2019- anticipate initiating Temadar 150 mg/m2 days 1-5 on a 28 day regimen     Cancer history · 6/14/2019-CT scan cervical spine without contrast documented no evidence of acute bony injury. Multilevel disc degeneration spondylosis. · 6/12/2019- Initiated Temodar along with radiation therapy   · 6/17/2019- MRI of the brain with and without contrast documented 3 cm enhancing, partially necrotic tumor in the both the right corpus callosum body, consistent with known astrocytoma. Additional FLAIR signal on both sides of the right central sulcus with faint contrast enhancement in the precentral gyrus, compatible with tumor extension. No hemorrhage or brain herniation. · 6/21/2019 - CT scan of the head without contrast hypodense focus of the right frontal lobe measuring 3.3 cm. No intracranial hemorrhage. No abnormal extra-axial fluid collection. Right frontal craniotomy changes. · 6/12/2019- 7/30/2019 -Chemoradiation for a total of 6000 cGy with Temodar daily. · 8/27/2019- MRI of the brain with and without contrast at [facility] revealed interval decrease in size and mass effect of the solid enhancing nodule now measuring 7 mm compared to 2 cm, suggesting a positive response to treatment. Interval increasing in FLAIR abnormality in the lateral ventricles is suspected to be induced by early radiation changes. No new enhancing nodule in this area  · 3/3/2020 - MRI of the brain with and without contrast documented a slightly decreased abnormal flair signal in the right frontoparietal region. Decrease in the enhancement with only subtle residual enhancement noted with no new regions of abnormal enhancement. · 6/4/2020-MRI of the brain with and without contrast documented residual gliosis in the right parietal white matter. No residual enhancement of the lesion above the right corpus callosum. · 9/8/2020-MRI of the brain with and without contrast documented stable exam without evidence of recurrent disease. Take 1 tablet every day by oral route.  divalproex (DEPAKOTE) 500 MG DR tablet Take 500 mg by mouth 3 times daily       levETIRAcetam (KEPPRA) 1000 MG tablet Take 1,000 mg by mouth 2 times daily       metoprolol succinate (TOPROL XL) 25 MG extended release tablet Take 100 mg by mouth       vitamin B-6 (PYRIDOXINE) 100 MG tablet       venlafaxine (EFFEXOR XR) 150 MG extended release capsule venlafaxine  mg capsule,extended release 24 hr      lactulose 20 GM/30ML SOLN Take by mouth as needed      ALPRAZolam (XANAX) 0.25 MG tablet Take 0.25 mg by mouth 2 times daily as needed for Sleep.  lacosamide (VIMPAT) 50 MG TABS tablet Take 150 mg by mouth every 12 hours.  omeprazole (PRILOSEC) 10 MG delayed release capsule Take 10 mg by mouth daily      losartan (COZAAR) 100 MG tablet Take 50 mg by mouth daily       oxyCODONE-acetaminophen (PERCOCET)  MG per tablet Take 1 tablet by mouth every 4 hours as needed for Pain for up to 30 days. Intended supply: 30 days 120 tablet 0     No current facility-administered medications for this visit. Allergies: Allergies   Allergen Reactions    Pregabalin Other (See Comments)    Lisinopril Swelling     BRAIN SWELLING/COMA    Lipitor [Atorvastatin] Rash       Social History:    Social History     Tobacco Use    Smoking status: Former Smoker     Packs/day: 0.50     Years: 20.00     Pack years: 10.00     Types: Cigarettes     Quit date: 2015     Years since quittin.6    Smokeless tobacco: Current User   Substance Use Topics    Alcohol use: Yes     Comment: OCC    Drug use: No       Family History:   No family history on file.     Vitals:  Vitals:    21 1345   BP: 122/86   Pulse: 115   Temp: 96.9 °F (36.1 °C)   SpO2: 95%   Weight: 167 lb 6.4 oz (75.9 kg)  Comment: per Charly Grover's latest weight on 21 (RONNY today   Height: 5' 8\" (1.727 m)        Subjective   REVIEW OF SYSTEMS:   Review of Systems Constitutional: Positive for activity change, fatigue and unexpected weight change (33 pounds over 8 weeks). Negative for chills, diaphoresis and fever. HENT: Negative. Negative for congestion, ear pain, hearing loss, nosebleeds, sore throat and tinnitus. Eyes: Negative. Negative for pain, discharge and redness. Respiratory: Negative. Negative for cough, shortness of breath and wheezing. Cardiovascular: Negative. Negative for chest pain, palpitations and leg swelling. Gastrointestinal: Negative. Negative for abdominal pain, blood in stool, constipation, diarrhea, nausea and vomiting. Endocrine: Negative for polydipsia. Genitourinary: Negative for dysuria, flank pain, frequency, hematuria and urgency. Musculoskeletal: Positive for arthralgias and gait problem. Negative for back pain, myalgias and neck pain. Skin: Negative. Negative for rash. Skin lesions to left elbow   Neurological: Positive for speech difficulty (Occasional difficulty with word finding) and weakness. Negative for dizziness, tremors, seizures and headaches. Hematological: Bruises/bleeds easily (Scattered ecchymosis to bilateral arms). Psychiatric/Behavioral: Negative. The patient is not nervous/anxious. Objective   PHYSICAL EXAM:  Physical Exam  Vitals signs reviewed. Constitutional:       General: He is not in acute distress. Appearance: He is well-developed. He is ill-appearing. He is not diaphoretic. HENT:      Head: Normocephalic and atraumatic. Mouth/Throat:      Pharynx: Uvula midline. Tonsils: No tonsillar exudate. Eyes:      General: Lids are normal. No scleral icterus. Right eye: No discharge. Left eye: No discharge. Conjunctiva/sclera: Conjunctivae normal.      Pupils: Pupils are equal, round, and reactive to light. Neck:      Musculoskeletal: Normal range of motion and neck supple. Thyroid: No thyroid mass or thyromegaly. Vascular: No JVD. Trachea: Trachea normal. No tracheal deviation. Cardiovascular:      Rate and Rhythm: Normal rate and regular rhythm. Heart sounds: Normal heart sounds. No murmur. No friction rub. No gallop. Pulmonary:      Effort: Pulmonary effort is normal. No respiratory distress. Breath sounds: Normal breath sounds. No wheezing or rales. Chest:      Chest wall: No tenderness. Abdominal:      General: Bowel sounds are normal. There is no distension. Palpations: Abdomen is soft. There is no mass. Tenderness: There is no abdominal tenderness. There is no guarding or rebound. Hernia: No hernia is present. Musculoskeletal:         General: No tenderness or deformity. Comments: Declined range of motion  x4 extremities  Significant weakness, left greater than right  Wheelchair-bound and requiring public assistance for transportation, no longer able to get in and out of vehicle   Skin:     General: Skin is warm. Coloration: Skin is not pale. Findings: Bruising and lesion (Abrasions left elbow) present. No erythema or rash. Neurological:      Mental Status: He is alert and oriented to person, place, and time. Cranial Nerves: No cranial nerve deficit. Motor: Weakness present. Coordination: Coordination normal.      Gait: Gait abnormal.   Psychiatric:         Behavior: Behavior normal.         Thought Content:  Thought content normal.      Comments: Occasional difficulty with word finding         Labs:     Lab Results   Component Value Date    WBC 6.68 02/05/2021    HGB 12.6 (L) 02/05/2021    HCT 36.1 (L) 02/05/2021    MCV 93.3 (H) 02/05/2021     (H) 02/05/2021     Lab Results   Component Value Date     (H) 02/05/2021    K 3.5 02/05/2021     (H) 02/05/2021    CO2 23 02/05/2021    BUN 10 02/05/2021    CREATININE 0.6 02/05/2021    GLUCOSE 122 (H) 02/05/2021    CALCIUM 9.0 02/05/2021    PROT 6.3 02/05/2021    LABALBU 3.4 (L) 02/05/2021 BILITOT 0.5 02/05/2021    ALKPHOS 62 02/05/2021    AST 32 02/05/2021    ALT 43 02/05/2021    LABGLOM >60 02/05/2021    GFRAA 89 02/14/2020    AGRATIO 1.9 02/14/2020    GLOB 2.8 02/05/2021     Lab Results   Component Value Date    NEUTROABS 3.36 02/05/2021         ASSESSMENT/PLAN:      1. Anaplastic glioma, unresectable MGMT un-methylated. Has been receiving palliative Temodar 150 mg/m² days 1 through 5 every 28 days, next dosing due 2/20/2021. MRI of the brain on 12/7/2020 continued to show no convincing evidence of recurrent disease with stable post therapeutic changes of the right posterior frontal lobe. Unfortunately Janny Coronado has experienced significant decline in performance status with severe weakness. There is concern for microscopic progression of disease. On 12/28/2020, Janny Coronado presented to [facility] emergency room with worsening confusion/altered mental status and decreased functional status. CT scan of the head on 12/28/2020 documented no acute intracranial findings, stable posttreatment change in the right frontal lobe with unchanged area of right frontal parenchymal calcification. He was evaluated by Dr. Tyra Mcrae who suggested either microscopic progression of disease that is not visible on MRI or long-term effects of radiation treatment. It was felt that he would benefit from physical rehabilitation and he was discharged on 12/31/2022 [facility] and rehab. Unfortunately while at [facility] he was diagnosed with COVID-19 requiring isolation, he was discharged from [facility] to home with home health earlier today. Janny Coronado continues to have significant weakness especially to his right side. He has a weight loss of 33 pounds over the past 8 weeks. Muscle wasting is noted and he has several lesions to his left elbow, reports obtain those from falls at [facility]. I reviewed the above findings with Dr. Cintia Hairston and he agreed with holding treatment, repeating MRI of the brain and initiating dexamethasone 4 mg twice daily. Concern for microscopic progression of disease is warranted.    -Hold Temodar, last taking in January 2021  -[facility] today  -Schedule MRI of the brain  -Electronic prescription sent for dexamethasone 4 mg p.o. twice daily, continue to follow-up  -Electronic prescription for Xanax 1 mg x 1 tab to be taken 30 minutes prior to MRI due to anxiety    2. Fatigue due to treatment and disease process. Significant decline in overall performance status, grade 3, continues to have increased falls and inability to ambulate. Discharged from [facility] skilled nursing rehab today, anticipating home health with physical therapy. 3. Chronic pain of both knees, increased weakness in the left knee leading to frequent falls, no change from previous evaluation  -Follow-up with Dr. Arnoldo Lynch and orthopedic clinic as recommended      4. Requires continuous supervision for activities of daily living (ADL) secondary to disease process and seizure activity.   -Seizure activity continues to be controlled with anticonvulsants managed by neurology     5. 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 Unfortunately intolerant to long-acting medications, experiences hallucinations and significant lethargy. Pain has been managed with Percocet 10 mg every 4 hours as needed    -Refill provided for Percocet 10/325 mg 1 tablet every 4 hours as needed for pain    6. Side effect monitoring of chemotherapy  -Fatigue grade 3, cannot decline over the past several months. -Experiences nausea with Temodar has Zofran, last dosing of Temodar in January 2021    7.  Hypertension -/86 today with a heart rate of 115, asymptomatic and taking losartan, metoprolol  mg daily and Lasix 20 mg daily under the direction of VLADIMIR Merino       FOLLOW UP: 1. Follow-up appointment given for 3 weeks, sooner if needed  2. Continue to follow echo providers as recommended    Discussed precautions related to [address] and being at increased risk. Discussed proper handwashing to be done frequently, limit exposure to other individuals and maintain social distancing of 6 feet. Recommend contacting primary care provider if having respiratory symptoms for further recommendations and consideration for testing.     (Please note that portions of this note were completed with a voice recognition program. Efforts were made to edit the dictations but occasionally words are mis-transcribed,  Also, portions of this note have been copied forward, however, changed to reflect the most current clinical status of this patient.)      Electronically signed by VLADIMIR Gusman on 2/8/2021 at 12:31 PM

## 2021-02-08 NOTE — OUTREACH NOTE
"Call Center TCM Note      Responses   [facility] patient discharged from?  Non-   Does the patient have one of the following disease processes/diagnoses(primary or secondary)?  Other   TCM attempt successful?  Yes   Call start time  1253   Call end time  1258   Person spoke with today (if not patient) and relationship  Mother/Ivonne   Is the patient taking all medications as directed (includes completed medication regime)?  Yes   Medication comments  States he will be on steroids for 4 weeks   Does the patient have an appointment with their PCP within 7 days of discharge?  Yes   Comments regarding PCP     Has the patient kept scheduled appointments due by today?  N/A   Comments  States they need to cancel appt because he is having an MRI that day.  Will message office to cancel appt and reschedule   What is the Home health agency?   [facility]   Has home health visited the patient within 72 hours of discharge?  Yes   Home health comments  States PT told her they were only there to show her how to transport him.  She thought he was going to get some type of therapy.  Advised to discuss with home health nurse when she comes tomorrow and also to discuss with Dr. Vargas at office visit   What is the patient's perception of their health status since discharge?  Same   Is the patient/caregiver able to teach back signs and symptoms related to disease process for when to call PCP?  Yes   Is the patient/caregiver able to teach back signs and symptoms related to disease process for when to call 911?  Yes   Is the patient/caregiver able to teach back the hierarchy of who to call/visit for symptoms/problems? PCP, Specialist, Home health nurse, Urgent Care, ED, 911  Yes   Additional teach back comments  States he is doing \"ok\".  They have put him on steroids for 4 weeks.     TCM call completed?  Yes   Wrap up additional comments  State he has appt Thurs with PCP and would like to cancel and reschedule due to have a MRI " that day.          Aimee Hendricks LPN    2/8/2021, 13:03 EST

## 2021-02-15 NOTE — PROGRESS NOTES
Subjective cc: rehab follow up   Lukasz Savage is a 56 y.o. male who presents via phone visit to discuss his rehab follow up. He consents to phone visit. Discussion mainly with his mother whom is his caretaker.     After [facility] sent him to [facility]    They wanted him to have PT   JENNA he was sent to [place] rehab - he was there for a week, on Monday he tested positive for COVID - asymptomatic - sent to [facility] where they had a covid unit.  He was then sent back to [facility]. He stayed there until 1 week ago - was DC to home.     Since home, he sleeps a lot. Mother thought it was his medication. Home health has been coming - they didn't see anything that should sedate him. He takes xanax very rarely. He is not taking his pain medication as often as he was. somedays doesn't take the pain pills at all.   They are not current taking lactulose - his BM have been moving well.   PT came and did eval - they havent seen anyone since then.   His right arm shakes a lot - reports this is like when his left side was twitching and he was dx with cancer.   He is scheduled for an MRI but hasnt been able to go because of weather - is supposed to go Thursday but will have to reschedule.   He then has follow up with Lauren Narvaez with Dr Doyle for his chemo and Dr Trevino - neurosurg.   They want to take him off chemo and put him on 1 month of steroids and get him stronger.   He can sit up in wheelchair  He can stand long enough to get wheelchair under him.   He can pivot to bed side toilet.   He is also saying that he feels something in his left hand but there is nothing there.     HTN has been well controlled - she checked it right after his trip to the bathroom this morning and he was in pain and it was high. She rechecked it now and it is 151/95 with HR 83. They will continue to monitor. Takes his norvasc about 1 pm     History of Present Illness     The following portions of the patient's history were  reviewed and updated as appropriate: allergies, current medications, past family history, past medical history, past social history, past surgical history and problem list.        Review of Systems   Constitutional: Positive for activity change and fatigue.   Gastrointestinal: Negative for constipation.   Musculoskeletal: Positive for gait problem.   Neurological: Positive for tremors and weakness.   Psychiatric/Behavioral: Negative for confusion. The patient is nervous/anxious.    All other systems reviewed and are negative.      Objective   Blood pressure (!) 154/101, pulse 62, SpO2 96 %.  Physical Exam    Assessment/Plan   Problems Addressed this Visit        Hematology and Neoplasia    Inoperable anaplastic glioma of brain  - Primary (Chronic)    Cancer associated pain       Neuro    Seizure disorder (CMS/HCC)      Other Visit Diagnoses     Essential hypertension        Weakness generalized          Diagnoses       Codes Comments    Inoperable anaplastic glioma of brain     -  Primary ICD-10-CM: C71.9  ICD-9-CM: 191.9     Cancer associated pain     ICD-10-CM: G89.3  ICD-9-CM: 338.3     Seizure disorder (CMS/HCC)     ICD-10-CM: G40.909  ICD-9-CM: 345.90     Essential hypertension     ICD-10-CM: I10  ICD-9-CM: 401.9     Weakness generalized     ICD-10-CM: R53.1  ICD-9-CM: 780.79           PLAN:     #1 weakness: new, worsening, pt with homehealth and PT, will check on status of PT, mother will also check on PATS for safer transport for him to appts and imaging - call us if needed     #2 HTN: chronic, uncontrolled, advised to monitor and if staying above goal increase norvasc to 10mg daily, DASH diet     #3 glioma: chronic, worsening, pt with tremors of arm and hallucinations which are new - will have repeat imaging could be recurrence or worsening of tumors, keep follow up with neurosurg and oncology     #4 fatigue/lethargy: new, will check labs to make sure medications do not need to be adjusted    #5 seizures 2/2  #3: chronic, stable, continue jenny current medication     40 minutes in care - complex patient           This document has been electronically signed by Malia Vargas MD on February 15, 2021 10:18 CST

## 2021-02-22 NOTE — TELEPHONE ENCOUNTER
I have called [facility] and spoke with Robson verbal orders given for labs and he advised that nursing is to see patient today and will draw and PT is to see patient 02/24/2021

## 2021-02-22 NOTE — TELEPHONE ENCOUNTER
----- Message from Malia Vargas MD sent at 2/15/2021 10:19 AM CST -----  Please send lab orders to [facility] to be drawn next time they go out and check on when PT plans to return to see him - only been there for initial eval

## 2021-02-23 NOTE — TELEPHONE ENCOUNTER
CONOR WITH [facility] CALLED TO REPORT THAT PT IS UNABLE TO AMBULATE, STATES PT'S MOTHER IS UNABLE TO HELP HIM OUT OF BED    CONOR ALSO REPORTS THAT PT'S [facility] WAS NOT ABLE TO BE PROCESSED BUT IS BEING RE-DRAWN TOMORROW    CALLBACK [phone]

## 2021-02-24 NOTE — TELEPHONE ENCOUNTER
Called pt's mom.  She stated that physical therapy has only came out 1 time and she feels pt needs more therapy. She wouldn't mind for him to be evaluated but doesn't want him sent to a facility. Mom stated that pt was at a facility and he fell multiple times and has concerns.   Pt's mom stated she would like a person (caregiver) that could help with daily things if that is possible.

## 2021-02-24 NOTE — TELEPHONE ENCOUNTER
Sri is there today to assess the patient and to let us know that they are discharging him from home health; however, she states the patient's family reports significant decline over the last couple of days.  She states the patient has modeling in both feet, he is hyper-extended in the cervical region, and again just overall decline reported.    She also requested the patient have an ammonia level but I told her we wouldn't order that but she will need to reach out to Dr Vargas's office since she is the medical doctor for the patient & she agreed.  Sri did report VS:  /60  HR 57  Temp 97.4  87% on room air      Of note: the patient did have an MRI today @ [facility] and the report/imaging is in his chart.    I told Sri that I would report this to Dr Trevino but that at this time he is in the OR and I won't see him or talk to him until tomorrow around 10 am.  She expressed understanding and is going to contact Dr Vargas's office.    I will call Sri & the patient's mother back tomorrow when Dr Trevino advises on what to do next.  The patient has an appt in our office on 3/16/21.      raine quarles CMA

## 2021-02-24 NOTE — TELEPHONE ENCOUNTER
Please contact pt mother and see if they feel like he needs additional assistance, ED evaluation or transfer to a facility?

## 2021-02-26 ENCOUNTER — HOSPITAL ENCOUNTER (OUTPATIENT)
Dept: INFUSION THERAPY | Age: 56
End: 2021-02-26
Payer: MEDICAID

## 2021-03-10 NOTE — TELEPHONE ENCOUNTER
Caller: Ivonne Savage    Relationship to patient: Mother    Best call back number: [phone]    Patient is needing:     Patients Mother states chemo has been discontinued and hospice has been called in. She is wondering if his appointment on 3/15 is needed, or if he is needing to consult with the hospice physicians from this point ? Please advise.

## 2021-03-10 NOTE — TELEPHONE ENCOUNTER
Spoke with mother who reports that patient is now on hospice care. Advised patient does not have to come to upcoming appt.

## 2021-04-19 NOTE — TELEPHONE ENCOUNTER
Ivonne said Lukasz is taking Trokendi XR 200mg 2 daily.  He is continuing to have seizures, his face twitches and he talks like he is drunk.  They last 3 to 5 minutes.  Notified Galen has prescribed Depakote 500mg BID and it may make him sleepy.  He has samples of Aptiom 800mg that he will keep in case he needs them in the future.   Tachypnea/Labored

## 2021-05-24 NOTE — TELEPHONE ENCOUNTER
Ivonne said Lukasz is home with Hospice.  The last MRI showed the cancer is growing again.  Hospice is taking care of his medications.  [facility] notified Hospice is prescribing his medications.

## 2023-03-23 NOTE — THERAPY EVALUATION
Acute Care - Physical Therapy Initial Evaluation  [facility]     Patient Name: Lukasz Savage  : 1965  MRN: [id number]  Today's Date: 2019   Onset of Illness/Injury or Date of Surgery: 19  Date of Referral to PT: 19  Referring Physician: Dr. Webb      Admit Date: 2019    Visit Dx:     ICD-10-CM ICD-9-CM   1. Status epilepticus (CMS/HCC) G40.901 345.3   2. 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 Dysphagia, unspecified type R13.10 787.20   4. Impaired mobility Z74.09 799.89     Patient Active Problem List   Diagnosis   • Hyperlipidemia   • HTN (hypertension)   • Gout   • Anxiety   • Arthritis   • Erectile dysfunction   • Angio-edema   • Seizures (CMS/HCC)   • HCAP (healthcare-associated pneumonia)   • MSSA (methicillin susceptible Staphylococcus aureus) infection   • Foot deformity   • Chronic gout of right foot   • Peripheral edema   • S/P shoulder surgery   • Claudication (CMS/HCC)   • Cigarette smoker   • BMI 30.0-30.9,adult   • BMI 33.0-33.9,adult   • Astrocytoma brain tumor (CMS/HCC)   • Encounter for consultation   • Benign neoplasm of brain (CMS/HCC)   • Status epilepticus (CMS/HCC)   • Hyperkalemia   • Acute kidney injury (CMS/HCC) on chronic kidney disease stage 3   • Leukocytosis   • Anemia     Past Medical History:   Diagnosis Date   • Anemia 2019   • Angio-edema 7/3/2017   • Anxiety    • Arthritis    • Cancer (CMS/HCC) 2019    Brain cancer diagnoised yesterday  Dr Trevino    • Clostridium difficile colitis    • Erectile dysfunction 10/6/2016   • GERD (gastroesophageal reflux disease)    • Gout    • HCAP (healthcare-associated pneumonia) 2017   • Hyperlipidemia    • Malignant hypertension    • MSSA (methicillin susceptible Staphylococcus aureus) infection 2017   • Obstructive sleep apnea    • Seizures (CMS/HCC) 2017     Past Surgical History:   Procedure Laterality Date   • COLONOSCOPY     • CRANIOTOMY Right 4/15/2019    Procedure:  CRANIOTOMY WITH BIOPSY RIGHT;  Surgeon: Dillon Trevino MD;  Location: [facility] OR;  Service: Neurosurgery   • SHOULDER ARTHROSCOPY Left    • TRACHEOSTOMY N/A 7/12/2017    Procedure: TRACHEOSTOMY WITH TRACHEOSCOPY;  Surgeon: Jairon Pierson MD;  Location:  [facility] OR;  Service:         PT ASSESSMENT (last 12 hours)      Physical Therapy Evaluation     Row Name 06/17/19 0915          PT Evaluation Time/Intention    Subjective Information  complains of;weakness;fatigue;pain  -MARJORIE     Document Type  evaluation  -MARJORIE     Mode of Treatment  physical therapy  -MARJORIE     Row Name 06/17/19 0915          General Information    Patient Profile Reviewed?  yes  -MARJORIE     Onset of Illness/Injury or Date of Surgery  06/14/19  -MARJORIE     Referring Physician  Dr Mehta  -MARJORIE     Patient Observations  alert;cooperative;agree to therapy  -MARJORIE     Patient/Family Observations  Daughter present  -MARJORIE     General Observations of Patient  Fowlers in bed  -MARJORIE     Prior Level of Function  independent:;all household mobility;ADL's;dressing;bathing  -MARJORIE     Equipment Currently Used at Home  cane, straight  -MARJORIE     Pertinent History of Current Functional Problem  Admitted with seizure. Dx: grade 3 astrocytoma, seizures with h/o seizures, MILES on CKD 3. L sided deficits from previous episodes, Pt began raditiation and chemo treatments June 13  -MARJORIE     Existing Precautions/Restrictions  fall  -MARJORIE     Limitations/Impairments  safety/cognitive  -MARJORIE     Risks Reviewed  patient and family:;LOB;nausea/vomiting;dizziness;increased discomfort;change in vital signs;lines disloged  -MARJORIE     Benefits Reviewed  patient and family:;improve function;increase independence;increase strength;increase balance;decrease pain;increase knowledge;improve skin integrity  -MARJORIE     Barriers to Rehab  medically complex;previous functional deficit  -MARJORIE     Row Name 06/17/19 0915          Relationship/Environment    Lives With  parent(s)  -MARJORIE     Row Name 06/17/19 0915           Resource/Environmental Concerns    Current Living Arrangements  home/apartment/condo  -MARJORIE     Row Name 06/17/19 0915          Home Main Entrance    Number of Stairs, Main Entrance  three  -MARJORIE     Row Name 06/17/19 0915          Cognitive Assessment/Interventions    Additional Documentation  Cognitive Assessment/Intervention (Group)  -MARJORIE     Row Name 06/17/19 0915          Cognitive Assessment/Intervention- PT/OT    Affect/Mental Status (Cognitive)  WFL  -MARJORIE     Orientation Status (Cognition)  oriented x 4  -MARJORIE     Follows Commands (Cognition)  WFL  -MARJORIE     Personal Safety Interventions  fall prevention program maintained;gait belt;muscle strengthening facilitated;nonskid shoes/slippers when out of bed  -MARJORIE     Row Name 06/17/19 0915          Safety Issues, Functional Mobility    Impairments Affecting Function (Mobility)  balance;endurance/activity tolerance;postural/trunk control  -MARJORIE     Row Name 06/17/19 0915          Bed Mobility Assessment/Treatment    Bed Mobility Assessment/Treatment  supine-sit;sit-supine  -MARJORIE     Supine-Sit [place] (Bed Mobility)  contact guard  -MARJORIE     Sit-Supine [place] (Bed Mobility)  contact guard  -MARJORIE     Assistive Device (Bed Mobility)  head of bed elevated  -MARJORIE     Row Name 06/17/19 0915          Transfer Assessment/Treatment    Transfer Assessment/Treatment  sit-stand transfer;stand-sit transfer  -MARJORIE     Sit-Stand [place] (Transfers)  contact guard;2 person assist  -MARJORIE     Stand-Sit [place] (Transfers)  contact guard;2 person assist  -MARJORIE     Row Name 06/17/19 0915          Gait/Stairs Assessment/Training    Gait/Stairs Assessment/Training  gait/ambulation independence;distance ambulated;gait pattern;gait deviations  -MARJORIE     [place] Level (Gait)  contact guard;2 person assist  -MARJORIE     Distance in Feet (Gait)  125  -MARJORIE     Pattern (Gait)  swing-to  -MARJORIE     Deviations/Abnormal Patterns (Gait)  left sided deviations;stride length decreased demos a left lateral  type lean  -MARJORIE Ortiz Name 06/17/19 0915          General ROM    GENERAL ROM COMMENTS  B LE AROM WFL  -MARJROIE Ortiz Name 06/17/19 0915          MMT (Manual Muscle Testing)    General MMT Comments  B LE 4+/5  -MARJORIE Ortiz Name 06/17/19 0915          Motor Assessment/Intervention    Additional Documentation  Balance (Group)  -MARJORIE Estrada 06/17/19 0915          Balance    Balance  static sitting balance;static standing balance  -MARJORIE Ortiz Name 06/17/19 0915          Static Sitting Balance    Level of [place] (Unsupported Sitting, Static Balance)  standby assist  -MARJORIE     Sitting Position (Unsupported Sitting, Static Balance)  sitting on edge of bed  -MARJORIE Estrada 06/17/19 0915          Static Standing Balance    Level of [place] (Supported Standing, Static Balance)  contact guard assist;2 person assist  -MARJORIE Estrada 06/17/19 0915          Sensory Assessment/Intervention    Sensory General Assessment  no sensation deficits identified B LE, per pt  -MARJORIE Estrada 06/17/19 0915          Vision Assessment/Intervention    Visual Processing Deficit  [place]-inattention/neglect, left  (Significant)   -MARJORIE Ortiz Name 06/17/19 0915          Pain Assessment    Additional Documentation  Pain Scale: Numbers Pre/Post-Treatment (Group)  -MARJORIE Ortiz Name 06/17/19 0915          Pain Scale: Numbers Pre/Post-Treatment    Pain Scale: Numbers, Pretreatment  0/10 - no pain  -MARJORIE     Pain Scale: Numbers, Post-Treatment  0/10 - no pain  -MARJORIE Ortiz Name 06/17/19 0915          Health Promotion    Additional Documentation  Coping (Group);Plan of Care Review (Group)  -MARJORIE Ortiz Name 06/17/19 0915          Coping    Observed Emotional State  accepting;cooperative  -MARJORIE Ortiz Name 06/17/19 0915          Plan of Care Review    Plan of Care Reviewed With  patient;daughter;mother  -MARJORIE Ortiz Name 06/17/19 0915          Physical Therapy Clinical Impression    Date of Referral to PT  06/17/19  -MARJORIE     Patient/Family Goals  Statement (PT Clinical Impression)  Go home  -MARJORIE     Criteria for Skilled Interventions Met (PT Clinical Impression)  yes;treatment indicated  -MARJORIE     Impairments Found (describe specific impairments)  gait, locomotion, and balance  -MARJORIE     Rehab Potential (PT Clinical Summary)  good, to achieve stated therapy goals  -MARJORIE     Predicted Duration of Therapy (PT)  until d/c  -MARJORIE     Care Plan Review (PT)  evaluation/treatment results reviewed;risks/benefits reviewed;current/potential barriers reviewed;patient/other agree to care plan  -MARJORIE     Row Name 06/17/19 0915          Physical Therapy Goals    Transfer Goal Selection (PT)  transfer, PT goal 1  -MARJORIE     Gait Training Goal Selection (PT)  gait training, PT goal 1  -MARJORIE     Stairs Goal Selection (PT)  stairs, PT goal 1  -MARJORIE     Additional Documentation  Stairs Goal Selection (PT) (Row)  -MARJORIE     Row Name 06/17/19 0915          Transfer Goal 1 (PT)    Activity/Assistive Device (Transfer Goal 1, PT)  sit-to-stand/stand-to-sit;bed-to-chair/chair-to-bed  -MARJORIE     [place] Level/Cues Needed (Transfer Goal 1, PT)  conditional independence;supervision required  -MARJORIE     Time Frame (Transfer Goal 1, PT)  long term goal (LTG);10 days  -MARJORIE     Progress/Outcome (Transfer Goal 1, PT)  goal ongoing  -MARJORIE     Row Name 06/17/19 0915          Gait Training Goal 1 (PT)    Activity/Assistive Device (Gait Training Goal 1, PT)  gait (walking locomotion);decrease fall risk;improve balance and speed;increase endurance/gait distance  -MARJORIE     [place] Level (Gait Training Goal 1, PT)  conditional independence;supervision required  -MARJORIE     Distance (Gait Goal 1, PT)  200  -MARJORIE     Time Frame (Gait Training Goal 1, PT)  long term goal (LTG);10 days  -MARJORIE     Progress/Outcome (Gait Training Goal 1, PT)  goal ongoing  -MARJORIE     Row Name 06/17/19 0915          Stairs Goal 1 (PT)    Activity/Assistive Device (Stairs Goal 1, PT)  ascending stairs;descending stairs  -MARJORIE     [place] Level/Cues  Needed (Stairs Goal 1, PT)  supervision required  -MARJORIE     Number of Stairs (Stairs Goal 1, PT)  3  -MARJORIE     Time Frame (Stairs Goal 1, PT)  long term goal (LTG);10 days  -MARJORIE     Progress/Outcome (Stairs Goal 1, PT)  goal ongoing  -MARJORIE     Row Name 06/17/19 0915          Positioning and Restraints    Pre-Treatment Position  in bed  -MARJORIE     Post Treatment Position  bed  -MARJORIE     In Bed  notified nsg;fowlers;call light within reach;encouraged to call for assist;exit alarm on;patient within staff view;with family/caregiver  -MARJORIE     Row Name 06/17/19 0915          Living Environment    Home Accessibility  stairs to enter home;tub/shower is not walk in  -MARJORIE       User Key  (r) = Recorded By, (t) = Taken By, (c) = Cosigned By    Initials Name Provider Type    Jaime Haile, PT DPT Physical Therapist        Physical Therapy Education     Title: PT OT SLP Therapies (Not Started)     Topic: Physical Therapy (In Progress)     Point: Mobility training (Done)     Learning Progress Summary           Patient Acceptance, E, VU,NR by MARJORIE at 6/17/2019  9:15 AM    Comment:  Educated pt/daughter on progression of PT POC and benefits of activity   Family Acceptance, E, VU,NR by MARJORIE at 6/17/2019  9:15 AM    Comment:  Educated pt/daughter on progression of PT POC and benefits of activity   Mother Acceptance, E, VU,NR by MARJORIE at 6/17/2019  9:15 AM    Comment:  Educated pt/daughter on progression of PT POC and benefits of activity                               User Key     Initials Effective Dates Name Provider Type Tamir AMADOR 08/02/16 -  Jaime Olsen, PT DPT Physical Therapist PT              PT Recommendation and Plan  Anticipated Discharge Disposition (PT): home with assist, home with home health, home with 24/7 care, home with OP services  Planned Therapy Interventions (PT Eval): bed mobility training, transfer training, gait training, balance training, home exercise program, patient/family education, postural re-education,  stair training, strengthening, motor coordination training  Therapy Frequency (PT Clinical Impression): daily  Outcome Summary/Treatment Plan (PT)  Anticipated Discharge Disposition (PT): home with assist, home with home health, home with 24/7 care, home with OP services  Plan of Care Reviewed With: patient, daughter, mother  Outcome Summary: PT eval completed. He presents alert and ready to get OOB. She demos equal B LE strength and was able to ambulate ~ 125 ft. He demos a left lateral type lean with gait. He also demos left side neglect/decreased left side awareness. PT will continue to progress his gait safety and dynamic balance. I anticipate he will d.c home with family, consider HH or outpatient therapy if needed.   Outcome Measures     Row Name 06/17/19 1100 06/17/19 0915          How much help from another person do you currently need...    Turning from your back to your side while in flat bed without using bedrails?  --  3  -MARJORIE     Moving from lying on back to sitting on the side of a flat bed without bedrails?  --  3  -MARJORIE     Moving to and from a bed to a chair (including a wheelchair)?  --  3  -MARJORIE     Standing up from a chair using your arms (e.g., wheelchair, bedside chair)?  --  3  -MARJORIE     Climbing 3-5 steps with a railing?  --  3  -MARJORIE     To walk in hospital room?  --  3  -MARJORIE     AM-PAC 6 Clicks Score  --  18  -MARJORIE        How much help from another is currently needed...    Putting on and taking off regular lower body clothing?  2  -MM  --     Bathing (including washing, rinsing, and drying)  2  -MM  --     Toileting (which includes using toilet bed pan or urinal)  2  -MM  --     Putting on and taking off regular upper body clothing  2  -MM  --     Taking care of personal grooming (such as brushing teeth)  3  -MM  --     Eating meals  3  -MM  --     Score  14  -MM  --        Functional Assessment    Outcome Measure Options  AM-PAC 6 Clicks Daily Activity (OT)  -MM  AM-PAC 6 Clicks Basic Mobility (PT)   -MARJORIE       User Key  (r) = Recorded By, (t) = Taken By, (c) = Cosigned By    Initials Name Provider Type    Jaime Haile, PT DPT Physical Therapist    Aaron Perry, OTR/L Occupational Therapist         Time Calculation:   PT Charges     Row Name 06/17/19 1043             Time Calculation    Start Time  0915  -MARJORIE      Stop Time  1010  -MARJORIE      Time Calculation (min)  55 min  -MARJORIE      PT Received On  06/17/19  -MARJORIE      PT Goal Re-Cert Due Date  06/27/19  -MARJORIE        User Key  (r) = Recorded By, (t) = Taken By, (c) = Cosigned By    Initials Name Provider Type    Jaime Haile, PT DPT Physical Therapist        Therapy Charges for Today     Code Description Service Date Service Provider Modifiers Qty    [id number] HC PT EVAL MOD COMPLEXITY 4 6/17/2019 Jaime Olsen, PT DPT GP 1          PT G-Codes  Outcome Measure Options: AM-PAC 6 Clicks Daily Activity (OT)  AM-PAC 6 Clicks Score: 18  Score: 14      Jaime Olsen, PT DPT  6/17/2019        Information: Selecting Yes will display possible errors in your note based on the variables you have selected. This validation is only offered as a suggestion for you. PLEASE NOTE THAT THE VALIDATION TEXT WILL BE REMOVED WHEN YOU FINALIZE YOUR NOTE. IF YOU WANT TO FAX A PRELIMINARY NOTE YOU WILL NEED TO TOGGLE THIS TO 'NO' IF YOU DO NOT WANT IT IN YOUR FAXED NOTE.

## 2023-03-30 NOTE — PLAN OF CARE
Great!  Does he feel comfortable with starting it immediately?   Problem: Patient Care Overview (Adult)  Goal: Plan of Care Review  Outcome: Ongoing (interventions implemented as appropriate)    07/18/17 1056   Coping/Psychosocial Response Interventions   Plan Of Care Reviewed With patient;family   Patient Care Overview   Progress progress toward functional goals as expected   Outcome Evaluation   Outcome Summary/Follow up Plan Pt appears to be tolerating valve with no s/s of distress. SLP educated on proper cleaning methods for valve and provided a shower collar for his trach. Pt completed trials of thin liquids with no overt s/s of aspiration.         Problem: Inpatient SLP  Goal: Dysphagia- Patient will safely consume diet as per recommendation with no signs/symptoms of aspiration  Outcome: Ongoing (interventions implemented as appropriate)    07/15/17 1157 07/16/17 1141 07/18/17 1056   Safely Consume Diet   Safely Consume Diet- SLP, Date Established 07/15/17 --  --    Safely Consume Diet- SLP, Time to Achieve by discharge --  --    Safely Consume Diet- SLP, Additional Goal Patient will tolerate upgraded PO trials with SLP w/ no s/s of aspiration. --  --    Safely Consume Diet- SLP, Date Goal Reviewed --  --  07/18/17   Safely Consume Diet- SLP, Outcome --  goal ongoing --        Goal: SLP Additional Goal 1  Outcome: Ongoing (interventions implemented as appropriate)    07/16/17 1139 07/17/17 1506 07/18/17 1056   SLP- Additional Goal 1   Additional Goal 1- SLP, Date Established 07/16/17 --  --    Additional Goal 1- SLP, Time to Achieve by discharge --  --    Additional Goal 1- SLP, Activity Level Patient will tolerate PMV without any s/s of dsitress and produce speech over background noise at 3 feet away. --  --    Additional Goal 1- SLP, Additional Goal Patient will independently place and remove PMV. --  --    Additional Goal 1- SLP, Date Goal Reviewed --  --  07/18/17   Additional Goal 1- SLP, Outcome --  goal ongoing --

## 2023-05-16 NOTE — TELEPHONE ENCOUNTER
Agree   
Eulalia Weeks with [facility] called to report another order is needed for a . Reports that prior order is no good due to insurance. Verbal order given for order MSW.   
Products Recommended: Daily facial moisturizer - Cetaphil oil control moisturizer with SPF 30
Detail Level: Zone
General Sunscreen Counseling: I recommended over-the-counter SPF 30 or higher sunscreen applied prior to going outdoors, even on cloudy days, and the use of broad-brimmed hats and sun-protective clothing. I advised that sunscreen should be reapplied every 2 hours, and immediately after swimming. I recommended the use of a daily facial moisturizer containing SPF 30 or higher sunscreen.

## 2023-08-20 NOTE — TELEPHONE ENCOUNTER
Ask him to come in and I will make referral to ortho.   
I HAVE CALLED PATIENT LEFT MESSAGE FOR HIM TO RETURN MY CALL  
PATIENT HAS BEEN CALLED HE WILL SCHEDULE APPT TO BE SEEN   
PATIENT IS HAVING EXTREME PAIN IN HIS KNEES AND IS WANTING TO KNOW WHAT HE CAN TAKE HE HAS BEEN ADVISED TO NOT TAKE NSAIDS DUE TO ACUTE KIDNEY PROCESS WORSENING FROM LAST CMP PLEASE REVIEW AND ADVISE WHAT I CAN PROVIDE TO THE PATIENT TO HELP RELIEVE THE KNEE PAIN.  
Patient states no history

## 2023-09-11 NOTE — OUTREACH NOTE
DR WHITAKER PT    PT PHONED OFFICE WANTING TO KNOW IF CB CAN SEND OVER HER LETTER/SCRIPT FOR HER PREVNAR 20. PT HAS DISCUSSED THIS WITH CB ALREADY AT LAST VISIT.    [facility]   Medical Week 2 Survey      Responses   Facility patient discharged from?  [place]   Does the patient have one of the following disease processes/diagnoses(primary or secondary)?  Other   Week 2 attempt successful?  Yes   Call start time  1409   Call end time  1413   Is patient permission given to speak with other caregiver?  Yes   List who call center can speak with  Mother Coronado   Meds reviewed with patient/caregiver?  Yes   Is the patient having any side effects they believe may be caused by any medication additions or changes?  No   Does the patient have all medications ordered at discharge?  Yes   Is the patient taking all medications as directed (includes completed medication regime)?  Yes   Does the patient have a primary care provider?   Yes   Does the patient have an appointment with their PCP within 7 days of discharge?  Yes   Comments regarding PCP     Has the patient kept scheduled appointments due by today?  Yes   What is the Home health agency?   [facility]    Has all DME been delivered?  Yes   Psychosocial issues?  Yes   Comments  He is more sleepy again today, is concerned, getting back to where he was   Did the patient receive a copy of their discharge instructions?  Yes   Nursing interventions  Reviewed instructions with patient   What is the patient's perception of their health status since discharge?  Same   Is the patient/caregiver able to teach back signs and symptoms related to disease process for when to call PCP?  Yes   Is the patient/caregiver able to teach back signs and symptoms related to disease process for when to call 911?  Yes   Is the patient/caregiver able to teach back the hierarchy of who to call/visit for symptoms/problems? PCP, Specialist, Home health nurse, Urgent Care, ED, 911  Yes   Week 2 Call Completed?  Yes   Wrap up additional comments  He is sleepy last few days, mother is worried told her to call the  Nurse and if cannot get nurse to call Dr. Vargas, not dangerous  level of sleepy just more like before hospital stay          Syeda Deleon, RN

## 2023-12-20 NOTE — PAYOR COMM NOTE
"ADMIT INPT 6-29-19  UR PHONE [phone]    Lukasz Wahl Mary (54 y.o. Male)     Date of Birth Social Security Number Address Home Phone MRN    [id number]  [address] [facility]  [place] [address] [phone] [id number]    Amish Marital Status          [facility] Single       Admission Date Admission Type Admitting Provider Attending Provider Department, Room/Bed    6/29/19 Emergency Jairon Perez DO Hancock, John C, DO [facility] 3A, 332/1    Discharge Date Discharge Disposition Discharge Destination                       Attending Provider:  Jairon Perez DO    Allergies:  Lipitor [Atorvastatin], Lisinopril    Isolation:  None   Infection:  C.difficile (07/07/17)   Code Status:  CPR    Ht:  172.7 cm (68\")   Wt:  84.6 kg (186 lb 9.6 oz)    Admission Cmt:  None   Principal Problem:  Left hemiparesis (CMS/HCC) [G81.94]                 Active Insurance as of 6/29/2019     Primary Coverage     Payor Plan Insurance Group Employer/Plan Group    [employer] [facility] [place]      Payor Plan Address Payor Plan Phone Number Payor Plan Fax Number Effective Dates    [address] [address]   7/1/2017 - None Entered    [place] [address]       Subscriber Name Subscriber Birth Date Member ID       LUKASZ WAHL MARY 1965 [id number]                 Emergency Contacts      (Rel.) Home Phone Work Phone Mobile Phone    Ivonne Wahl (Mother) [phone] -- --    Yvette Pollack (Daughter) [phone] -- [phone]               History & Physical      Cristino Encinas DO at 6/29/2019  3:01 PM              [facility]  HISTORY AND PHYSICAL    Date of Admission: 6/29/2019  Primary Care Physician: Linda Hoffman, DNP, APRN    Subjective     Chief Complaint:   Low blood sugar, left-sided weakness    History of Present Illness    This 54-year-old white male was admitted with a chief complaint of low blood " ----- Message from Chasity Neves DO sent at 12/20/2023 11:53 AM EST -----  Please inform patient that ultrasound shows fatty liver, recommend healthy diet and regular exercise.  Prior findings that appeared consistent with liver cysts on prior CT are not seen on current imaging.    sugar and left-sided weakness.  Patient's mother provides a majority of the history as the patient very somnolent off and on.  Patient's mother states that he was difficult to arouse this morning so she decided to let him sleep in.  She finally managed to awaken him at around 7:30 AM and checked his blood sugar noting that it was 65.  She called EMS and when they arrived at the patient's blood sugar was 49.  The patient was administered an amp of D50 by EMS.  He improved significantly and became less lethargic but is still much more lethargic than typical.  Patient has a history of inoperable astrocytoma and seizure activity resulting from that.  He is currently on oral chemotherapy.  He has lost use of his left side and his mother states that it has been  progressively worsening.  When interviewed the patient awakens, garbles and mumbles unintelligibly then falls back to sleep.    CT of the head without contrast in the emergency department shows ill-defined masses in the right frontal lobe compatible with a history of brain neoplasm.  The largest mass measures 3.2 cm and shows mild central necrosis.  Chest x-ray shows no acute cardiopulmonary disease.  CMP shows a glucose of 131, , AST 68, valproic acid level within normal limits, normal white blood cell count, hemoglobin low at 12.  The patient was admitted for further evaluation.  The patient's family and I briefly discussed hospice care at home versus palliative care at a nursing facility.    Review of Systems   Constitutional: Negative for decreased appetite and fever.   HENT: Negative.    Respiratory: Negative.    Cardiovascular: Negative.  Negative for palpitations.   Gastrointestinal: Negative.  Negative for abdominal pain, nausea and vomiting.   Genitourinary: Negative.  Negative for bladder incontinence.   Musculoskeletal: Negative.    Skin: Negative for rash.   Neurological: Positive for weakness and left partial hemiparesis. Negative for seizures,  light-headedness and headaches.   Hematological: Negative.    Psychiatric/Behavioral: Negative for agitation and hallucinations.      Otherwise complete ROS reviewed and negative except as mentioned in the HPI.      Past Medical History:     Past Medical History:   Diagnosis Date   • Anemia 6/17/2019   • Angio-edema 7/3/2017   • Anxiety    • Arthritis    • Cancer (CMS/HCC) 05/09/2019    Brain cancer diagnoised yesterday  Dr Trevino    • Clostridium difficile colitis    • Erectile dysfunction 10/6/2016   • GERD (gastroesophageal reflux disease)    • Gout    • HCAP (healthcare-associated pneumonia) 7/11/2017   • Hyperlipidemia    • Malignant hypertension    • MSSA (methicillin susceptible Staphylococcus aureus) infection 7/31/2017   • Obstructive sleep apnea    • S/P shoulder surgery 7/27/2018   • Seizures (CMS/HCC) 7/4/2017       Past Surgical History:  Past Surgical History:   Procedure Laterality Date   • COLONOSCOPY     • CRANIOTOMY Right 4/15/2019    Procedure: CRANIOTOMY WITH BIOPSY RIGHT;  Surgeon: Dillon Trevino MD;  Location:  [facility] OR;  Service: Neurosurgery   • SHOULDER ARTHROSCOPY Left    • TRACHEOSTOMY N/A 7/12/2017    Procedure: TRACHEOSTOMY WITH TRACHEOSCOPY;  Surgeon: Jairon Pierson MD;  Location:  [facility] OR;  Service:        Family History:   family history includes Diabetes in his maternal grandmother and mother; Heart attack in his paternal grandfather; Heart disease in his father; Hypertension in his father and mother; Kidney disease in his sister; No Known Problems in his daughter, maternal grandfather, paternal grandmother, and son.    Social History:    reports that he quit smoking about 1 years ago. His smoking use included cigarettes. He has a 9.90 pack-year smoking history. He has never used smokeless tobacco. He reports that he does not drink alcohol or use drugs.    Medications:  Prior to Admission medications    Medication Sig Start Date End Date Taking? Authorizing Provider    allopurinol (ZYLOPRIM) 300 MG tablet Take 300 mg by mouth Daily.    Phil Constantino MD   ALPRAZolam (XANAX) 0.25 MG tablet Take 1 tablet by mouth 2 (Two) Times a Day As Needed for Anxiety. 5/20/19   Linda Hoffman DNP, APRN   aspirin 81 MG tablet Take 1 tablet by mouth Daily. 2/28/19   Royal Todd PA   dexamethasone (DECADRON) 4 MG tablet Take 1 tablet by mouth 3 (Three) Times a Day With Meals. 6/25/19   Oral Jessica III, MD   divalproex (DEPAKOTE) 500 MG DR tablet Take 3 tablets by mouth Every 8 (Eight) Hours. 6/20/19   Jairon Webb MD   fenofibrate (TRICOR) 145 MG tablet Take 1 tablet by mouth Daily. 5/20/19   Linda Hoffman DNP, APRN   lacosamide (VIMPAT) 50 MG tablet tablet Take 3 tablets by mouth Every 12 (Twelve) Hours for 30 days. 6/20/19 7/20/19  Jairon Webb MD   lacosamide (VIMPAT) 50 MG tablet tablet Take 3 tablets by mouth every 12 hours 6/20/19   Jairon Webb MD   levETIRAcetam (KEPPRA) 500 MG tablet Take 2.5 tablets by mouth 2 (Two) Times a Day. 6/20/19   Jairon Webb MD   losartan (COZAAR) 100 MG tablet Take 100 mg by mouth Daily.    ProviderPhil MD   Omega-3 Fatty Acids (FISH OIL) 1000 MG capsule capsule Take 2 capsules by mouth Daily With Breakfast. 5/20/19   Linda Hoffman DNP, APRN   omeprazole (PRILOSEC) 10 MG capsule Take 1 capsule by mouth Daily. For acid reflux 5/20/19   Linda Hoffman DNP, APRN   oxyCODONE-acetaminophen (PERCOCET) 7.5-325 MG per tablet Take 1 tablet by mouth Every 4 (Four) Hours As Needed for Moderate Pain  or Severe Pain . 6/25/19   Oral Jessica III, MD   pregabalin (LYRICA) 150 MG capsule Take 150 mg by mouth 2 (Two) Times a Day.    Phil Constantino MD   QUEtiapine (SEROquel) 50 MG tablet Take 1 tablet by mouth Every Night. 6/20/19   Jairon Webb MD   TEMOZOLOMIDE PO Take 10 mg by mouth Daily.    Provider, MD Phil   venlafaxine XR (EFFEXOR-XR) 150 MG 24 hr capsule Take 1  "capsule by mouth Daily. 5/20/19   Linda Hoffman, DNP, APRN   vitamin B-6 (PYRIDOXINE) 100 MG tablet Take 1 tablet by mouth Daily. 4/3/19   Royal Todd PA       Allergies:  Allergies   Allergen Reactions   • Lipitor [Atorvastatin] Rash   • Lisinopril Angioedema     Swell tongue       Objective     Vital Signs:   /90   Pulse 63   Temp 98 °F (36.7 °C)   Resp 14   Ht 172.7 cm (68\")   Wt 84.6 kg (186 lb 9.6 oz)   SpO2 97%   BMI 28.37 kg/m²      Physical Exam   Constitutional: He appears well-developed and well-nourished. He appears lethargic. He is cooperative. He has a sickly appearance.   Moon face.   HENT:   Head: Normocephalic and atraumatic.   Right Ear: External ear normal.   Left Ear: External ear normal.   Nose: Nose normal.   Mouth/Throat: Oropharynx is clear and moist.   Eyes: Conjunctivae and EOM are normal. Pupils are equal, round, and reactive to light.   Neck: Normal range of motion. Neck supple.   Cardiovascular: Normal rate, regular rhythm and normal heart sounds.   No murmur heard.  Pulmonary/Chest: Effort normal and breath sounds normal. No respiratory distress.   Abdominal: Soft. Bowel sounds are normal. He exhibits no distension and no mass. There is no tenderness.   Musculoskeletal: Normal range of motion. He exhibits no edema or tenderness.   Neurological: He appears lethargic. He exhibits abnormal muscle tone ( Flaccid left upper extremity). Coordination abnormal.   3/5 strength left lower extremity.  5/5 strength right upper and lower extremities   Skin: Skin is warm and dry.   Psychiatric: His speech is slurred. He is slowed.           Results Reviewed:  Lab Results (last 24 hours)     Procedure Component Value Units Date/Time    POC Glucose Once [[id number]]  (Abnormal) Collected:  06/29/19 1323    Specimen:  Blood Updated:  06/29/19 1335     Glucose 69 mg/dL      Comment: : [id number] Lian Beckman ID: [id number]       POC Glucose Once [[id number]]  " (Normal) Collected:  06/29/19 1155    Specimen:  Blood Updated:  06/29/19 1206     Glucose 71 mg/dL      Comment: : [id number] Emmie Cokre ID: [id number]       [place] Draw [[id number]] Collected:  06/29/19 0950    Specimen:  Blood Updated:  06/29/19 1100    Narrative:       The following orders were created for panel order [place] Draw.  Procedure                               Abnormality         Status                     ---------                               -----------         ------                     Green Top (Gel)[[id number]]                                  Final result               [place] Blood Culture Baudilio...[[id number]]                      Final result                 Please view results for these tests on the individual orders.    Green Top (Gel) [[id number]] Collected:  06/29/19 0950    Specimen:  Blood Updated:  06/29/19 1100     Extra Tube Hold for add-ons.     Comment: Auto resulted.       [place] Blood Culture Bottle Set [[id number]] Collected:  06/29/19 0950    Specimen:  Blood Updated:  06/29/19 1100     Extra Tube Hold for add-ons.     Comment: Auto resulted.       Valproic Acid Level, Total [[id number]]  (Normal) Collected:  06/29/19 0948    Specimen:  Blood Updated:  06/29/19 1011     Valproic Acid 93.3 mcg/mL     Comprehensive Metabolic Panel [[id number]]  (Abnormal) Collected:  06/29/19 0948    Specimen:  Blood Updated:  06/29/19 1009     Glucose 131 mg/dL      BUN 33 mg/dL      Creatinine 1.37 mg/dL      Sodium 142 mmol/L      Potassium 4.1 mmol/L      Chloride 108 mmol/L      CO2 23.0 mmol/L      Calcium 8.9 mg/dL      Total Protein 6.4 g/dL      Albumin 3.50 g/dL      ALT (SGPT) 173 U/L      AST (SGOT) 68 U/L      Alkaline Phosphatase 51 U/L      Total Bilirubin 0.4 mg/dL      eGFR Non African Amer 54 mL/min/1.73      Globulin 2.9 gm/dL      A/G Ratio 1.2 g/dL      BUN/Creatinine Ratio 24.1     Anion Gap 11.0 mmol/L     Narrative:       GFR Normal >60  Chronic Kidney Disease  <60  Kidney Failure <15    CBC & Differential [[id number]] Collected:  06/29/19 0948    Specimen:  Blood Updated:  06/29/19 0958    Narrative:       The following orders were created for panel order CBC & Differential.  Procedure                               Abnormality         Status                     ---------                               -----------         ------                     CBC Auto Differential[[id number]]        Abnormal            Final result                 Please view results for these tests on the individual orders.    CBC Auto Differential [[id number]]  (Abnormal) Collected:  06/29/19 0948    Specimen:  Blood Updated:  06/29/19 0958     WBC 9.59 10*3/mm3      RBC 4.20 10*6/mm3      Hemoglobin 12.0 g/dL      Hematocrit 36.5 %      MCV 86.9 fL      MCH 28.6 pg      MCHC 32.9 g/dL      RDW 16.9 %      RDW-SD 53.3 fl      MPV 9.5 fL      Platelets 308 10*3/mm3      Neutrophil % 62.2 %      Lymphocyte % 28.3 %      Monocyte % 4.8 %      Eosinophil % 0.0 %      Basophil % 0.7 %      Immature Grans % 4.0 %      Neutrophils, Absolute 5.97 10*3/mm3      Lymphocytes, Absolute 2.71 10*3/mm3      Monocytes, Absolute 0.46 10*3/mm3      Eosinophils, Absolute 0.00 10*3/mm3      Basophils, Absolute 0.07 10*3/mm3      Immature Grans, Absolute 0.38 10*3/mm3      nRBC 0.2 /100 WBC     POC Glucose Once [[id number]]  (Abnormal) Collected:  06/29/19 0915    Specimen:  Blood Updated:  06/29/19 0925     Glucose 145 mg/dL      Comment: : [id number] Emmie Coker ID: [id number]             Ct Head Without Contrast    Result Date: 6/29/2019  Narrative: EXAMINATION: CT HEAD WO CONTRAST- 6/29/2019 11:16 AM CDT  HISTORY: Altered mental status, h/o brain tumor, anaplastic astrocytoma.  DOSE: 714 mGycm (Automatic exposure control technique was implemented in an effort to keep the radiation dose as low as possible without compromising image quality)  REPORT: Spiral CT of the head was performed without contrast,  reconstructed coronal and sagittal images were also reviewed.  COMPARISON: CT of the head without contrast 06/21/2019 and brain MRI with and without contrast 06/17/2019.  There is a small craniotomy defect over the right parietal region as before, there are 2 hyperattenuating masses in the right frontal lobe with ill-defined margins that appear similar to the previous CT. The largest is seen adjacent to the falx on image 19 of series 3 measures up to 3.2 cm, essentially unchanged compared with the previous CT. There is a small area of probable central necrosis within this larger mass. The second more lateral lesion is difficult to measure and appears elongated, extending superiorly into the right frontal region. This also appears to be stable.There are no definite new lesions. No intracranial hemorrhage is identified. The ventricles and basal cisterns appear normal.      Impression: 1. No acute intracranial abnormality, there are ill-defined masses in the right frontal lobe compatible the history of brain neoplasm, similar to the previous head CT. The largest measures about 3.2 cm and shows mild central necrosis. This report was finalized on 06/29/2019 11:23 by Dr. Marvin Mar MD.    Ct Head Without Contrast    Result Date: 6/21/2019  Narrative: CT HEAD WO CONTRAST- 6/21/2019 7:21 PM CDT  HISTORY: sz  COMPARISON: 06/14/2019, MRI brain 06/17/2019  DOSE LENGTH PRODUCT: 694 mGy cm. Automated exposure control was also utilized to decrease patient radiation dose.  TECHNIQUE: Axial images of brain obtained without IV contrast  FINDINGS:  The ventricles are stable in size and configuration. There are stable hyperdense foci the right frontal region measuring 3.3 cm medially 9 mm more laterally (best seen on image 18 and 19. Along the superior aspect of the corpus callosum within the right frontal lobe with adjacent linear enhancement. Findings represent the hyperdense foci in the nonenhanced CT head study. No  intracranial hemorrhage is visualized. There is no abnormal extra axial fluid collection. There are right frontal craniotomy changes. No depressed skull fracture.      Impression: 1. The hyperdense foci of the right frontal lobe are similar to the 06/14/2019 CT head exam representing the peripherally enhancing centrally necrotic lesion with adjacent lateral enhancement in the right frontal lobe adjacent the corpus callosum on the MRI brain of 06/17/2019. No intracranial hemorrhage identified. This report was finalized on 06/21/2019 19:44 by Dr. Sri Rivera MD.    Ct Head Without Contrast    Result Date: 6/14/2019  Narrative: EXAMINATION: CT HEAD WO CONTRAST-  6/14/2019 7:55 AM CDT  CT SCAN OF THE HEAD, WITHOUT CONTRAST:  HISTORY: Seizures, history of brain tumor previous right-sided frontal craniotomy.  COMPARISONS: 06/03/2019 head CT  TECHNIQUE:  Radiation dose equals  mGy-cm.  Automated exposure control dose reduction technique was implemented.   CT evaluation of the head without intravenous contrast. 5 mm transaxial images were obtained.   2-D sagittal and coronal reconstruction images were generated.  FINDINGS: Examination was sent to statrad for preliminary interpretation.  Changes from right craniotomy observed.  The right sided high density lesion is again observed superiorly in the right frontal lobe, near the midline, extending from the corpus callosum was noted previously and is essentially unchanged. Second probable high density lesion observed more laterally in the right frontal lobe near the craniotomy defect. The CT appearance is most likely unchanged.  There is no mass effect or midline shift.  There is no hydrocephalus.  There is no developing hemorrhage.  Mucosal thickening observed in the ethmoid sinuses.           Impression: 1. Stable CT appearance of the head compared to 06/03/2019, as described above.                      This report was finalized on 06/14/2019 08:08 by Dr. Jay  MD Maxim.    Ct Head Without Contrast    Result Date: 6/3/2019  Narrative: EXAMINATION: CT HEAD WO CONTRAST- 6/3/2019 7:25 PM CDT  HISTORY: Bilateral upper extremity weakness, history of brain tumor (anaplastic infiltrating glioma) and previous right-sided frontal craniotomy.  DOSE: 633 mGycm (Automatic exposure control technique was implemented in an effort to keep the radiation dose as low as possible without compromising image quality)  REPORT: Spiral CT of the head was performed without contrast, reconstructed coronal and sagittal images are also reviewed.  COMPARISON: CT the head without contrast 04/15/2019, MRI of the brain with without contrast 04/02/2019.  There is evidence of previous right craniotomy, with resolution of a small amount of pneumocephalus is seen on the previous head CT. There are 2 ill-defined masslike areas of increased attenuation within the right frontal lobe, largest is medially and abuts the falx, just superior to the right frontal horn. This measures approximately 3.3 cm, appears to significantly increase in size compared with the previous head CT, when it measured approximately 1.8 cm. This mass appears to involve the corpus callosum on the right and has mild mass effect on the frontal horn. There are areas of increased attenuation is in the lateral aspect of the right frontal lobe and is ill-defined, measuring up to 1.5 cm. This also appears slightly increased compared with the previous CT. The ventricles and basal cisterns are within normal limits and there is no hydrocephalus. No intracranial hemorrhage is identified. There is no midline shift or evidence of tonsillar or uncal herniation.      Impression: 1. Evidence of previous right craniotomy for biopsy of 2 right frontal masses which appear increased in size compared with the previous examinations, concerning for tumor progression. No intracranial hemorrhage or midline shift is identified.  This report was finalized on  06/03/2019 19:31 by Dr. Marvin Mar MD.    Ct Cervical Spine Without Contrast    Result Date: 6/14/2019  Narrative: EXAMINATION:  CT CERVICAL SPINE WO CONTRAST-  6/14/2019 4:46 AM CDT  HISTORY: fall, seizure  COMPARISON: No comparison study.  TECHNIQUE: Radiation dose equals  mGy-cm.  Automated exposure control dose reduction technique was implemented.  Thin section axial imaging was obtained without intravenous contrast. 2-D sagittal and coronal reconstruction images were generated.  FINDINGS: Examination was sent to statrad for preliminary interpretation.  The facets are appropriately aligned.  The vertebral bodies are maintained. Prevertebral soft tissues are normal without acute fracture or subluxation.  There is multilevel disc degeneration with disc space narrowing endplate irregularities and osteophyte formation C6-C7, C5-C6, C4-C5 and to a lesser degree C3-C4.  There is mild posterior listhesis CT for on C5 and mild anterolisthesis of C3 on C4.  There is relative canal foraminal narrowing at multiple levels particularly at C5-C6.  There is no CT evidence of posttraumatic disc herniation.      Impression: 1. No CT evidence of acute bony injury to the cervical spine. 2. Multilevel disc degeneration spondylosis. This report was finalized on 06/14/2019 08:11 by Dr. Pierre Pan MD.    Mri Brain With & Without Contrast    Result Date: 6/17/2019  Narrative:  History: 54-year-old with seizures. Anaplastic astrocytoma.  Reference Brain MRI June 2019.  Technique Routine pre and postcontrast enhanced brain MRI.  Findings There is no diffusion abnormality.  3.7 cm of FLAIR hyperintensity involving the inferior/posterior right frontal lobe with involvement of the cortex is unchanged. This is immediately cephalad to the right lateral ventricular body. There is 3 cm necrotic enhancing soft tissue immediately below this area of FLAIR signal causing mass effect but not clearly invading the right corpus callosum  body (see sagittal postcontrast images). This is all consistent with patient's known astrocytoma. There is additional FLAIR signal on both sides of the right central sulcus with faint contrast enhancement in the precentral gyrus. This is compatible with tumor extension. No tumor is visible in the left cerebral hemisphere or posterior fossa. There is no brain herniation. No hemorrhage is seen. Dural venous sinuses are patent. Ventricular system maintains normal caliber.       Impression: Approximately 3 cm enhancing partially necrotic tumor immediately above the right corpus callosum body. Associated FLAIR signal is noted immediately above it. Faint enhancement and FLAIR signal extending to the right precentral gyrus, consistent with additional tumor extension. This report was finalized on 06/17/2019 21:09 by Dr Eugene Thomas, .    Mri Brain With & Without Contrast    Result Date: 6/4/2019  Narrative: EXAM: MRI BRAIN W WO CONTRAST- - 6/3/2019 9:49 PM CDT  HISTORY: new L arm weakness; D33.2-Benign neoplasm of brain, unspecified; R29.898-Other symptoms and signs involving the musculoskeletal system. Neurosurgery office visit note 05/09/2019 indicates right frontal biopsy under stereotactic guidance 04/15/2019 with grade 3 anaplastic astrocytoma.  COMPARISON: 06/03/2019.  TECHNIQUE: Routine protocol MRI performed of the brain without and with intravenous contrast.  FINDINGS: There is a masslike appearance at the right frontal lobe, parafalcine location measuring about 2.9 x 1.4 cm, previously 1.7 x 1.1 cm, with mass effect and possible extension into the corpus callosum. The aforementioned mass does efface the adjacent sulci, but there is no significant midline shift. The second previously described larger area has indistinct borders and is difficult to measure, appearing similar to slightly increased in size and involves the right motor cortex. There is associated faint enhancement and mild restricted diffusion. The mass  lesions do not appear to cross midline. There is some questionable precontrast T1 hyperintensity such as axial series serial one image 1 22-99. This could represent hemorrhage/necrosis, postbiopsy/posttreatment changes or possibly artifact. On T2 star series, the biopsy tract is visualized.  Ventricles, cisterns and sulci are normal in size and configuration. Sella within normal limits. Brainstem and posterior fossa are within normal limits.  Visualized contrasted vessels and noncontrasted flow voids are within normal limits. Visualized portions of the orbits, paranasal sinuses and mastoid air cells are within normal limits. Visualized overlying soft tissues within normal limits.       Impression: 1. Increased size of right frontal lobe mass lesions compared to 04/02/2019, which are described in more detail above.  This report was finalized on 06/04/2019 08:12 by Dr Sindy Mota MD.    Xr Chest 1 View    Result Date: 6/21/2019  Narrative: HISTORY: Seizure new CXR: Frontal view the chest obtained.  COMPARISON: 06/16/2019  FINDINGS: There is a diminished level of inspiration. There are basilar density secondary mild central vascular prominence. Heart appears mildly enlarged with left ventricular prominence. There is no pleural effusion or pneumothorax. There is an old healed right clavicle fracture.      Impression: 1. Diminished level of inspiration with probable basilar atelectasis and mild venous congestion versus vascular crowding. This report was finalized on 06/21/2019 21:28 by Dr. Sri Rivera MD.    Xr Chest 1 View    Result Date: 6/16/2019  Narrative: Frontal upright radiograph of the chest 6/16/2019 3:49 AM CDT  COMPARISON: 06/15/2019.  FINDINGS: The lungs are clear. The cardiac silhouettes upper limits of normal. Nasogastric tube and endotracheal tube are satisfactorily position.  The osseous structures and surrounding soft tissues demonstrate no acute abnormality.      Impression: 1. No radiographic  evidence of acute cardiopulmonary process.   This report was finalized on 06/16/2019 08:00 by Dr. Adolph Echols MD.    Xr Chest 1 View    Result Date: 6/15/2019  Narrative: Frontal upright radiograph of the chest 6/15/2019 3:45 AM CDT  COMPARISON: 06/14/2019.  HISTORY: Patient intubated.  FINDINGS: The lungs are clear. The cardiac silhouette is normal. Endotracheal tube and nasogastric tube are satisfactorily position.  The osseous structures and surrounding soft tissues demonstrate no acute abnormality.      Impression: 1. No radiographic evidence of acute cardiopulmonary process.   This report was finalized on 06/15/2019 08:10 by Dr. Adolph Echols MD.    Xr Chest 1 View    Result Date: 6/14/2019  Narrative: EXAM: XR CHEST 1 VW- - 6/14/2019 4:15 AM CDT  HISTORY: post intubation   COMPARISON: 07/15/2017.  TECHNIQUE:  1 images.  Frontal view of the chest.  FINDINGS:  Endotracheal tube tip projects 1.5 cm from the jolly. No pneumothorax or large pleural effusion. No dense focal consolidation. Prominent cardiac silhouette. Upper mediastinum within normal limits. No acute or suspicious bony findings. Probable old right clavicle fracture.       Impression: 1. Endotracheal tube tip projects 1.5 cm above the jolly. 2. No focal consolidation. This report was finalized on 06/14/2019 07:06 by Dr Sindy Mota MD.    Xr Abdomen Kub    Result Date: 6/14/2019  Narrative: XR ABDOMEN KUB- 6/14/2019 12:25 PM CDT  HISTORY: New OG needing to give meds via this route; G40.901-Epilepsy, unspecified, not intractable, with status epilepticus; C71.9-Malignant neoplasm of brain, unspecified   COMPARISON: 07/14/2017  FINDINGS: There is a nonspecific bowel gas pattern. Nasogastric tube is present the distal tip satisfactorily positioned in the region of the body the stomach. Endotracheal tube is present just proximal to the jolly.  No acute skeletal abnormality is identified.      Impression: 1. Nasogastric tube satisfactorily  position distal tip region of the body the stomach.   This report was finalized on 06/14/2019 13:14 by Dr. Adolph Echols MD.     I have personally reviewed and interpreted the radiology studies and ECG obtained at time of admission.     Assessment / Plan      Assessment & Plan  Active Hospital Problems    Diagnosis   • **Left hemiparesis (CMS/HCC)   • Hypoglycemia   • Anemia   • Astrocytoma brain tumor (CMS/HCC)   • Seizures (CMS/HCC)   • HTN (hypertension)       PLAN:   Admit to the medical surgical floor  Normal saline at 100 cc/h  Fingerstick blood sugar before meals and at bedtime  Neurology consultation  Restart the bulk of home medications  Case management/ consultation for probable SNF placement    Code Status:   Full code  Surrogate decision makers the patient's daughter     I discussed the patient's findings and my recommendations with: Patient, his daughter and mother    Estimated length of stay: Unknown    Cristino Encinas DO   06/29/19   3:01 PM                Electronically signed by Cristino Encinas DO at 6/29/2019  3:12 PM          Emergency Department Notes      Dillon Rios Jr., MD at 6/29/2019 10:12 AM          Subjective   Patient is brought to the emergency room by ambulance with his mother in attendance.  She says he normally gets up about 6:30 in the morning as she gives him his medicine then but this morning he did not wake up as usual.  She had decided to let him sleep enzyme but that he still would not arouse at 730.  He normally sleeps on the couch and she sleeps in the recliner next to him.  She managed to finally get him to set up but when she checked his sugar it was 65 and then when EMS got there was 49 so they gave him some D50.  He has gradually come around to some degree but mother says he still very lethargic compared to his usual self.  He does have a history of brain tumor and she says they have been told it is inoperable and incurable.  However he is  taking chemo for it.  He has lost most of the use of his left side.  He denies any pain to me when I awaken and talk to him but he does not seem to be able to focus well and does drift back off to sleep when left alone.        History provided by:  Patient and parent   used: No    Altered Mental Status   Presenting symptoms: lethargy and partial responsiveness    Severity:  Severe  Most recent episode:  Today  Episode history:  Single  Duration:  3 hours  Timing:  Constant  Progression:  Partially resolved  Chronicity:  Recurrent  Context: not alcohol use, not dementia, not drug use, not head injury, not homeless, taking medications as prescribed, not nursing home resident, not recent change in medication, not recent illness and not recent infection    Associated symptoms: weakness    Associated symptoms: no abdominal pain, normal movement, no agitation, no bladder incontinence, no decreased appetite, no depression, no difficulty breathing, no eye deviation, no fever, no hallucinations, no headaches, no light-headedness, no nausea, no palpitations, no rash, no seizures, no slurred speech, no suicidal behavior, no visual change and no vomiting        Review of Systems   Constitutional: Negative for decreased appetite and fever.   HENT: Negative.    Respiratory: Negative.    Cardiovascular: Negative.  Negative for palpitations.   Gastrointestinal: Negative.  Negative for abdominal pain, nausea and vomiting.   Genitourinary: Negative.  Negative for bladder incontinence.   Musculoskeletal: Negative.    Skin: Negative for rash.   Neurological: Positive for weakness. Negative for seizures, light-headedness and headaches.   Hematological: Negative.    Psychiatric/Behavioral: Negative for agitation and hallucinations.   All other systems reviewed and are negative.        Objective   Physical Exam   Constitutional: He appears well-developed and well-nourished. He appears lethargic.   HENT:   Head:  Normocephalic and atraumatic.   Eyes: EOM are normal. Pupils are equal, round, and reactive to light.   Neck: Normal range of motion. Neck supple.   Cardiovascular: Normal rate and regular rhythm.   Pulmonary/Chest: Effort normal and breath sounds normal.   Musculoskeletal: Normal range of motion.   Neurological: He appears lethargic.   Patient does awaken to voice and answers questions correctly but then just back off to sleep.  He does not move his left extremities.   Psychiatric:   Patient is lethargic.   Nursing note and vitals reviewed.      Procedures      ED Course as of Jun 29 1258   Sat Jun 29, 2019   1256 I spent some time talking with the patient's mother and daughter about his condition and the findings.  I told him that everything looked pretty stable right now but that this is the kind of thing that I would expect with his brain tumor.  If they are being told it is incurable that means there will be progression and as it progresses he will have more seizures and eventually become less responsive.  Right now he cannot swallow anything adequately to protect his airway.  He cannot move his left side and walk.  He is less responsive than usual right now.  I have told family to be thinking of future options if they want to consider hospice or something else like that.  At the present time will admit to stabilize because his sugar continues to drop.  I spoke with Dr. Nair and he is aware.  [TR]      ED Course User Index  [TR] Dillon Rios Jr., MD          MDM  Number of Diagnoses or Management Options  Altered mental status, unspecified altered mental status type: established and worsening  Brain tumor (CMS/HCC): established and worsening  Hypoglycemia: new and requires workup     Amount and/or Complexity of Data Reviewed  Clinical lab tests: ordered and reviewed  Tests in the radiology section of CPT®:  ordered and reviewed  Tests in the medicine section of CPT®:  reviewed and ordered  Discuss the  patient with other providers: yes    Risk of Complications, Morbidity, and/or Mortality  Presenting problems: moderate  Diagnostic procedures: moderate  Management options: moderate    Patient Progress  Patient progress: stable      Final diagnoses:   Hypoglycemia   Altered mental status, unspecified altered mental status type   Brain tumor (CMS/HCC)          Dillon Rios Jr., MD  06/29/19 1258      Electronically signed by Dillon Rios Jr., MD at 6/29/2019 12:58 PM       ICU Vital Signs     Row Name 07/01/19 0723 07/01/19 0435 07/01/19 0009 06/30/19 2030 06/30/19 1604       Vitals    Temp  --  96.3 °F (35.7 °C)  98.3 °F (36.8 °C)  98.1 °F (36.7 °C)  98.4 °F (36.9 °C)    Temp src  --  Axillary  Oral  Oral  Oral    Pulse  68  76  83  94  83    Heart Rate Source  Monitor  Monitor  Monitor  Monitor  Monitor    Resp  16  18  16  16  17    Resp Rate Source  Visual  Visual  Visual  Visual  Visual    BP  --  141/94  137/95  136/85  135/97    Noninvasive MAP (mmHg)  --  --  --  --  106    BP Location  --  Right arm  Right arm  Right arm  Right arm    BP Method  --  Automatic  Automatic  Automatic  Automatic    Patient Position  --  Lying  Lying  Lying  Lying       Oxygen Therapy    SpO2  97 %  99 %  98 %  98 %  98 %    Pulse Oximetry Type  Intermittent  Intermittent  Intermittent  Intermittent  Intermittent    Device (Oxygen Therapy)  room air  room air  room air  room air  room air    Row Name 06/30/19 1146 06/30/19 0805 06/30/19 0733 06/30/19 0300 06/29/19 2354       Vitals    Temp  --  98.2 °F (36.8 °C)  --  97.9 °F (36.6 °C)  98.8 °F (37.1 °C)    Temp src  --  Oral  --  Oral  Oral    Pulse  82  69  --  66  66    Heart Rate Source  Monitor  Monitor  --  Monitor  Monitor    Resp  18  18  --  18  18    Resp Rate Source  Visual  Visual  --  Visual  Visual    BP  147/97  139/98  --  122/85  137/92    Noninvasive MAP (mmHg)  108  106  --  --  --    BP Location  Right arm  Right arm  --  Right arm  Right arm    BP  "Method  Automatic  Automatic  --  Automatic  Automatic    Patient Position  Lying  Lying  --  Lying  Lying       Oxygen Therapy    SpO2  98 %  98 %  95 %  100 %  100 %    Pulse Oximetry Type  Intermittent  Intermittent  Intermittent  Intermittent  Intermittent    Device (Oxygen Therapy)  room air  room air  room air  room air  room air    Row Name 06/29/19 2016 06/29/19 2000 06/29/19 1528 06/29/19 12:46:41 06/29/19 1216       Vitals    Temp  98 °F (36.7 °C)  --  98.2 °F (36.8 °C)  98 °F (36.7 °C)  --    Temp src  Oral  --  Oral  --  --    Pulse  63  --  63  63  61    Heart Rate Source  Monitor  --  Monitor  Monitor  --    Resp  18  --  16  14  --    Resp Rate Source  Visual  --  Visual  Visual  --    BP  151/94  --  138/86  135/90  148/86    Noninvasive MAP (mmHg)  --  --  --  99  --    BP Location  Right arm  --  Left arm  --  --    BP Method  Automatic  --  Automatic  --  --    Patient Position  Lying  --  Lying  --  --       Oxygen Therapy    SpO2  99 %  --  100 %  97 %  --    Pulse Oximetry Type  Intermittent  --  Intermittent  Continuous  --    Device (Oxygen Therapy)  room air  room air  room air  room air  --    Row Name 06/29/19 0938 06/29/19 0917                Height and Weight    Height  --  172.7 cm (68\")       Height Method  --  Stated       Weight  --  84.6 kg (186 lb 9.6 oz)       Weight Method  --  Bed scale       Ideal Body Weight (IBW) (kg)  --  70.89       BSA (Calculated - sq m)  --  1.98 sq meters       BMI (Calculated)  --  28.4       Weight in (lb) to have BMI = 25  --  164.1          Vitals    Temp  --  97.9 °F (36.6 °C)       Temp src  --  Oral       Pulse  --  59       Heart Rate Source  --  Monitor       Resp  --  12       Resp Rate Source  --  Monitor       BP  --  142/85       BP Location  --  Right arm       BP Method  --  Automatic       Patient Position  --  Lying          Oxygen Therapy    SpO2  92 %  --       Pulse Oximetry Type  Continuous  --       Device (Oxygen Therapy)  room " air  --              Intake and Output (last 48 hours)      Intake/Output     Row Name 07/01/19 0410 07/01/19 0400 07/01/19 0000 06/30/19 2030 06/30/19 0700       Urine Output    Urine  --  --  --  --  750 mL    Urine Unmeasured Occurrence  1  1  1  1  --    Urinary Incontinence  No  Yes  No  No  --       Stool Output    Stool Unmeasured Occurrence  --  --  1  --  --    Bowel Incontinence  --  --  No  --  --    Row Name 06/30/19 0533 06/30/19 0532 06/30/19 0100 06/29/19 2100 06/29/19 2032       dextrose 5 % and sodium chloride 0.45 % with KCl 20 mEq/L infusion - Peripheral IV 06/29/19 0930 Left Antecubital     Start: 06/29/19 1218    Rate  --  --  --  --  0 mL/hr       sodium chloride 0.9 % infusion     Start: 06/29/19 1545    Rate  100 mL/hr  0 mL/hr  --  --  --    Volume (mL)  --  1025 mL  --  --  --       Urine Output    Urine  --  --  725 mL  350 mL  --    Row Name 06/29/19 1647 06/29/19 1234 06/29/19 0917             Weights    Weight  --  --  84.6 kg (186 lb 9.6 oz)      Weight Method  --  --  Bed scale      Ideal Body Weight (IBW) (kg)  --  --  70.89      BSA (Calculated - sq m)  --  --  1.98 sq meters      BMI (Calculated)  --  --  28.4         dextrose 5 % and sodium chloride 0.45 % with KCl 20 mEq/L infusion - Peripheral IV 06/29/19 0930 Left Antecubital     Start: 06/29/19 1218    Rate  --  125 mL/hr  --         sodium chloride 0.9 % infusion     Start: 06/29/19 1545    Rate  100 mL/hr  --  --            Hospital Medications (all)       Dose Frequency Start End    ALPRAZolam (XANAX) tablet 0.25 mg 0.25 mg 2 Times Daily PRN 6/29/2019     Sig - Route: Take 1 tablet by mouth 2 (Two) Times a Day As Needed for Anxiety. - Oral    aspirin chewable tablet 81 mg 81 mg Daily 6/30/2019     Sig - Route: Chew 1 tablet Daily. - Oral    bisacodyl (DULCOLAX) suppository 10 mg 10 mg Daily PRN 6/29/2019     Sig - Route: Insert 1 suppository into the rectum Daily As Needed for Constipation. - Rectal    dexamethasone  "(DECADRON) injection 10 mg 10 mg Once 6/29/2019 6/29/2019    Sig - Route: Infuse 1 mL into a venous catheter 1 (One) Time. - Intravenous    dexamethasone (DECADRON) tablet 4 mg 4 mg 3 Times Daily With Meals 6/29/2019     Sig - Route: Take 1 tablet by mouth 3 (Three) Times a Day With Meals. - Oral    Divalproex Sodium (DEPAKOTE SPRINKLE) capsule 1,500 mg 1,500 mg Every 8 Hours Scheduled 6/29/2019     Sig - Route: Take 12 capsules by mouth Every 8 (Eight) Hours. - Oral    famotidine (PEPCID) injection 20 mg 20 mg Every 12 Hours Scheduled 6/29/2019     Sig - Route: Infuse 2 mL into a venous catheter Every 12 (Twelve) Hours. - Intravenous    lacosamide (VIMPAT) tablet 150 mg 150 mg Every 12 Hours Scheduled 6/29/2019 7/20/2019    Sig - Route: Take 3 tablets by mouth Every 12 (Twelve) Hours. - Oral    levETIRAcetam (KEPPRA) tablet 1,250 mg 1,250 mg 2 Times Daily 6/29/2019     Sig - Route: Take 1,250 mg by mouth 2 (Two) Times a Day. - Oral    levETIRAcetam in NaCl 0.75% (KEPPRA) IVPB 1,000 mg 1,000 mg Once 6/29/2019 6/29/2019    Sig - Route: Infuse 100 mL into a venous catheter 1 (One) Time. - Intravenous    morphine injection 1 mg 1 mg Every 4 Hours PRN 6/29/2019 7/9/2019    Sig - Route: Infuse 0.5 mL into a venous catheter Every 4 (Four) Hours As Needed for Moderate Pain . - Intravenous    Linked Group 1:  \"And\" Linked Group Details        naloxone (NARCAN) injection 0.4 mg 0.4 mg Every 5 Minutes PRN 6/29/2019     Sig - Route: Infuse 1 mL into a venous catheter Every 5 (Five) Minutes As Needed for Respiratory Depression. - Intravenous    Linked Group 1:  \"And\" Linked Group Details        nicotine (NICODERM CQ) 21 MG/24HR patch 1 patch 1 patch Every 24 Hours Scheduled 6/29/2019     Sig - Route: Place 1 patch on the skin as directed by provider Daily. - Transdermal    ondansetron (ZOFRAN) injection 4 mg 4 mg Every 6 Hours PRN 6/29/2019     Sig - Route: Infuse 2 mL into a venous catheter Every 6 (Six) Hours As Needed for " "Nausea or Vomiting. - Intravenous    pregabalin (LYRICA) capsule 100 mg 100 mg Every 12 Hours Scheduled 6/30/2019     Sig - Route: Take 1 capsule by mouth Every 12 (Twelve) Hours. - Oral    QUEtiapine (SEROquel) tablet 50 mg 50 mg Nightly 6/29/2019     Sig - Route: Take 2 tablets by mouth Every Night. - Oral    sodium chloride 0.9 % flush 10 mL 10 mL As Needed 6/29/2019     Sig - Route: Infuse 10 mL into a venous catheter As Needed for Line Care. - Intravenous    Linked Group 2:  \"And\" Linked Group Details        sodium chloride 0.9 % flush 3 mL 3 mL Every 12 Hours Scheduled 6/29/2019     Sig - Route: Infuse 3 mL into a venous catheter Every 12 (Twelve) Hours. - Intravenous    sodium chloride 0.9 % flush 3-10 mL 3-10 mL As Needed 6/29/2019     Sig - Route: Infuse 3-10 mL into a venous catheter As Needed for Line Care. - Intravenous    temozolomide (TEMODAR) chemo capsule 10 mg 10 mg Nightly 6/29/2019 7/25/2019    Sig - Route: Take 2 capsules by mouth Every Night. - Oral    Linked Group 3:  \"And\" Linked Group Details        temozolomide (TEMODAR) chemo capsule 140 mg 140 mg Nightly 6/29/2019 7/25/2019    Sig - Route: Take 1 capsule by mouth Every Night. - Oral    Linked Group 3:  \"And\" Linked Group Details        venlafaxine XR (EFFEXOR-XR) 24 hr capsule 150 mg 150 mg Daily 6/30/2019     Sig - Route: Take 2 capsules by mouth Daily. - Oral    dextrose 5 % and sodium chloride 0.45 % with KCl 20 mEq/L infusion (Discontinued) 125 mL/hr Continuous 6/29/2019 6/30/2019    Sig - Route: Infuse 125 mL/hr into a venous catheter Continuous. - Intravenous    divalproex (DEPAKOTE) DR tablet 1,500 mg (Discontinued) 1,500 mg Every 8 Hours Scheduled 6/29/2019 6/29/2019    Sig - Route: Take 3 tablets by mouth Every 8 (Eight) Hours. - Oral    divalproex (DEPAKOTE) DR tablet 1,500 mg (Discontinued) 1,500 mg Every 8 Hours Scheduled 6/30/2019 6/29/2019    Sig - Route: Take 3 tablets by mouth Every 8 (Eight) Hours. - Oral    Cosign for " Ordering: Accepted by Cristino Encinas DO on 6/29/2019  5:33 PM    Divalproex Sodium (DEPAKOTE SPRINKLE) capsule 1,500 mg (Discontinued) 1,500 mg 3 Times Daily 6/29/2019 6/29/2019    Sig - Route: Take 12 capsules by mouth 3 (Three) Times a Day. - Oral    Cosign for Ordering: Accepted by Cristino Encinas DO on 6/29/2019  5:33 PM    pregabalin (LYRICA) capsule 150 mg (Discontinued) 150 mg Every 12 Hours Scheduled 6/29/2019 6/30/2019    Sig - Route: Take 2 capsules by mouth Every 12 (Twelve) Hours. - Oral    sodium chloride 0.9 % infusion (Discontinued) 100 mL/hr Continuous 6/29/2019 6/30/2019    Sig - Route: Infuse 100 mL/hr into a venous catheter Continuous. - Intravenous    temozolomide (TEMODAR) chemo capsule 10 mg (Discontinued) 10 mg Daily 6/29/2019 6/29/2019    Sig - Route: Take 2 capsules by mouth Daily. - Oral    Reason for Discontinue: Reorder          Lab Results (last 48 hours)     Procedure Component Value Units Date/Time    Ammonia [[id number]]  (Abnormal) Collected:  06/30/19 2311    Specimen:  Blood Updated:  06/30/19 2335     Ammonia 154 umol/L     POC Glucose Once [[id number]]  (Normal) Collected:  06/30/19 1958    Specimen:  Blood Updated:  06/30/19 2009     Glucose 103 mg/dL      Comment: : [id number] Ramos (Kiera) AmandaMeter ID: [id number]       POC Glucose Once [[id number]]  (Normal) Collected:  06/30/19 1707    Specimen:  Blood Updated:  06/30/19 1719     Glucose 109 mg/dL      Comment: : [id number] Lian MartinezenMeter ID: [id number]       POC Glucose Once [[id number]]  (Normal) Collected:  06/30/19 1141    Specimen:  Blood Updated:  06/30/19 1153     Glucose 83 mg/dL      Comment: : [id number] Lian MartinezenMeter ID: [id number]       POC Glucose Once [[id number]]  (Normal) Collected:  06/30/19 0712    Specimen:  Blood Updated:  06/30/19 0724     Glucose 85 mg/dL      Comment: : [id number] Lian Beckman ID: [id number]       CBC Auto Differential [[id number]]  (Abnormal)  Collected:  06/30/19 0634    Specimen:  Blood Updated:  06/30/19 0707     WBC 9.68 10*3/mm3      RBC 4.63 10*6/mm3      Hemoglobin 13.4 g/dL      Hematocrit 39.9 %      MCV 86.2 fL      MCH 28.9 pg      MCHC 33.6 g/dL      RDW 16.2 %      RDW-SD 49.6 fl      MPV 9.7 fL      Platelets 276 10*3/mm3      Neutrophil % 58.9 %      Lymphocyte % 31.6 %      Monocyte % 5.5 %      Eosinophil % 0.1 %      Basophil % 0.6 %      Immature Grans % 3.3 %      Neutrophils, Absolute 5.70 10*3/mm3      Lymphocytes, Absolute 3.06 10*3/mm3      Monocytes, Absolute 0.53 10*3/mm3      Eosinophils, Absolute 0.01 10*3/mm3      Basophils, Absolute 0.06 10*3/mm3      Immature Grans, Absolute 0.32 10*3/mm3      nRBC 0.0 /100 WBC     Ammonia [[id number]]  (Abnormal) Collected:  06/30/19 0634    Specimen:  Blood Updated:  06/30/19 0658     Ammonia 37 umol/L     Comprehensive Metabolic Panel [[id number]]  (Abnormal) Collected:  06/30/19 0634    Specimen:  Blood Updated:  06/30/19 0656     Glucose 70 mg/dL      BUN 32 mg/dL      Creatinine 1.25 mg/dL      Sodium 141 mmol/L      Potassium 4.5 mmol/L      Chloride 111 mmol/L      CO2 21.0 mmol/L      Calcium 8.8 mg/dL      Total Protein 6.4 g/dL      Albumin 3.50 g/dL      ALT (SGPT) 157 U/L      AST (SGOT) 54 U/L      Alkaline Phosphatase 54 U/L      Total Bilirubin 0.5 mg/dL      eGFR Non African Amer 60 mL/min/1.73      Globulin 2.9 gm/dL      A/G Ratio 1.2 g/dL      BUN/Creatinine Ratio 25.6     Anion Gap 9.0 mmol/L     Narrative:       GFR Normal >60  Chronic Kidney Disease <60  Kidney Failure <15    POC Glucose Once [[id number]]  (Normal) Collected:  06/29/19 1855    Specimen:  Blood Updated:  06/29/19 1918     Glucose 102 mg/dL      Comment: : [id number] Lian MontesKennedi ID: [id number]       POC Glucose Once [[id number]]  (Abnormal) Collected:  06/29/19 1323    Specimen:  Blood Updated:  06/29/19 1335     Glucose 69 mg/dL      Comment: : [id number] Lian eBckman ID: [id number]        POC Glucose Once [[id number]]  (Normal) Collected:  06/29/19 1155    Specimen:  Blood Updated:  06/29/19 1206     Glucose 71 mg/dL      Comment: : [id number] Emmie Coker ID: [id number]       [place] Draw [[id number]] Collected:  06/29/19 0950    Specimen:  Blood Updated:  06/29/19 1100    Narrative:       The following orders were created for panel order [place] [facility].  Procedure                               Abnormality         Status                     ---------                               -----------         ------                     Green Top (Gel)[[id number]]                                  Final result               [place] Blood Culture Baudilio...[[id number]]                      Final result                 Please view results for these tests on the individual orders.    Green Top (Gel) [[id number]] Collected:  06/29/19 0950    Specimen:  Blood Updated:  06/29/19 1100     Extra Tube Hold for add-ons.     Comment: Auto resulted.       [place] Blood Culture Bottle Set [[id number]] Collected:  06/29/19 0950    Specimen:  Blood Updated:  06/29/19 1100     Extra Tube Hold for add-ons.     Comment: Auto resulted.       Valproic Acid Level, Total [[id number]]  (Normal) Collected:  06/29/19 0948    Specimen:  Blood Updated:  06/29/19 1011     Valproic Acid 93.3 mcg/mL     Comprehensive Metabolic Panel [[id number]]  (Abnormal) Collected:  06/29/19 0948    Specimen:  Blood Updated:  06/29/19 1009     Glucose 131 mg/dL      BUN 33 mg/dL      Creatinine 1.37 mg/dL      Sodium 142 mmol/L      Potassium 4.1 mmol/L      Chloride 108 mmol/L      CO2 23.0 mmol/L      Calcium 8.9 mg/dL      Total Protein 6.4 g/dL      Albumin 3.50 g/dL      ALT (SGPT) 173 U/L      AST (SGOT) 68 U/L      Alkaline Phosphatase 51 U/L      Total Bilirubin 0.4 mg/dL      eGFR Non African Amer 54 mL/min/1.73      Globulin 2.9 gm/dL      A/G Ratio 1.2 g/dL      BUN/Creatinine Ratio 24.1     Anion Gap 11.0 mmol/L     Narrative:       GFR  Normal >60  Chronic Kidney Disease <60  Kidney Failure <15    CBC & Differential [[id number]] Collected:  06/29/19 0948    Specimen:  Blood Updated:  06/29/19 0958    Narrative:       The following orders were created for panel order CBC & Differential.  Procedure                               Abnormality         Status                     ---------                               -----------         ------                     CBC Auto Differential[[id number]]        Abnormal            Final result                 Please view results for these tests on the individual orders.    CBC Auto Differential [[id number]]  (Abnormal) Collected:  06/29/19 0948    Specimen:  Blood Updated:  06/29/19 0958     WBC 9.59 10*3/mm3      RBC 4.20 10*6/mm3      Hemoglobin 12.0 g/dL      Hematocrit 36.5 %      MCV 86.9 fL      MCH 28.6 pg      MCHC 32.9 g/dL      RDW 16.9 %      RDW-SD 53.3 fl      MPV 9.5 fL      Platelets 308 10*3/mm3      Neutrophil % 62.2 %      Lymphocyte % 28.3 %      Monocyte % 4.8 %      Eosinophil % 0.0 %      Basophil % 0.7 %      Immature Grans % 4.0 %      Neutrophils, Absolute 5.97 10*3/mm3      Lymphocytes, Absolute 2.71 10*3/mm3      Monocytes, Absolute 0.46 10*3/mm3      Eosinophils, Absolute 0.00 10*3/mm3      Basophils, Absolute 0.07 10*3/mm3      Immature Grans, Absolute 0.38 10*3/mm3      nRBC 0.2 /100 WBC     POC Glucose Once [[id number]]  (Abnormal) Collected:  06/29/19 0915    Specimen:  Blood Updated:  06/29/19 0925     Glucose 145 mg/dL      Comment: : [id number] Emmie [facility] ID: [id number]             Lab Results (last 48 hours)     Procedure Component Value Units Date/Time    Ammonia [[id number]]  (Abnormal) Collected:  06/30/19 2311    Specimen:  Blood Updated:  06/30/19 2335     Ammonia 154 umol/L     POC Glucose Once [[id number]]  (Normal) Collected:  06/30/19 1958    Specimen:  Blood Updated:  06/30/19 2009     Glucose 103 mg/dL      Comment: : [id number] Ramos (Roscoe) AmandaMeter ID:  [id number]       POC Glucose Once [[id number]]  (Normal) Collected:  06/30/19 1707    Specimen:  Blood Updated:  06/30/19 1719     Glucose 109 mg/dL      Comment: : [id number] Lian MontesMeter ID: [id number]       POC Glucose Once [[id number]]  (Normal) Collected:  06/30/19 1141    Specimen:  Blood Updated:  06/30/19 1153     Glucose 83 mg/dL      Comment: : [id number] Lian MontesMeter ID: [id number]       POC Glucose Once [[id number]]  (Normal) Collected:  06/30/19 0712    Specimen:  Blood Updated:  06/30/19 0724     Glucose 85 mg/dL      Comment: : [id number] Lian MontesMeter ID: [id number]       CBC Auto Differential [[id number]]  (Abnormal) Collected:  06/30/19 0634    Specimen:  Blood Updated:  06/30/19 0707     WBC 9.68 10*3/mm3      RBC 4.63 10*6/mm3      Hemoglobin 13.4 g/dL      Hematocrit 39.9 %      MCV 86.2 fL      MCH 28.9 pg      MCHC 33.6 g/dL      RDW 16.2 %      RDW-SD 49.6 fl      MPV 9.7 fL      Platelets 276 10*3/mm3      Neutrophil % 58.9 %      Lymphocyte % 31.6 %      Monocyte % 5.5 %      Eosinophil % 0.1 %      Basophil % 0.6 %      Immature Grans % 3.3 %      Neutrophils, Absolute 5.70 10*3/mm3      Lymphocytes, Absolute 3.06 10*3/mm3      Monocytes, Absolute 0.53 10*3/mm3      Eosinophils, Absolute 0.01 10*3/mm3      Basophils, Absolute 0.06 10*3/mm3      Immature Grans, Absolute 0.32 10*3/mm3      nRBC 0.0 /100 WBC     Ammonia [[id number]]  (Abnormal) Collected:  06/30/19 0634    Specimen:  Blood Updated:  06/30/19 0658     Ammonia 37 umol/L     Comprehensive Metabolic Panel [[id number]]  (Abnormal) Collected:  06/30/19 0634    Specimen:  Blood Updated:  06/30/19 0656     Glucose 70 mg/dL      BUN 32 mg/dL      Creatinine 1.25 mg/dL      Sodium 141 mmol/L      Potassium 4.5 mmol/L      Chloride 111 mmol/L      CO2 21.0 mmol/L      Calcium 8.8 mg/dL      Total Protein 6.4 g/dL      Albumin 3.50 g/dL      ALT (SGPT) 157 U/L      AST (SGOT) 54 U/L      Alkaline Phosphatase  54 U/L      Total Bilirubin 0.5 mg/dL      eGFR Non African Amer 60 mL/min/1.73      Globulin 2.9 gm/dL      A/G Ratio 1.2 g/dL      BUN/Creatinine Ratio 25.6     Anion Gap 9.0 mmol/L     Narrative:       GFR Normal >60  Chronic Kidney Disease <60  Kidney Failure <15    POC Glucose Once [[id number]]  (Normal) Collected:  06/29/19 1855    Specimen:  Blood Updated:  06/29/19 1918     Glucose 102 mg/dL      Comment: : [id number] Lian MartinezenMeter ID: [id number]       POC Glucose Once [[id number]]  (Abnormal) Collected:  06/29/19 1323    Specimen:  Blood Updated:  06/29/19 1335     Glucose 69 mg/dL      Comment: : [id number] Lain MartinezenMeter ID: [id number]       POC Glucose Once [[id number]]  (Normal) Collected:  06/29/19 1155    Specimen:  Blood Updated:  06/29/19 1206     Glucose 71 mg/dL      Comment: : [id number] Emmie Coker ID: [id number]       [place] Draw [[id number]] Collected:  06/29/19 0950    Specimen:  Blood Updated:  06/29/19 1100    Narrative:       The following orders were created for panel order [place] Draw.  Procedure                               Abnormality         Status                     ---------                               -----------         ------                     Green Top (Gel)[[id number]]                                  Final result               [place] Blood Culture Baudilio...[[id number]]                      Final result                 Please view results for these tests on the individual orders.    Green Top (Gel) [[id number]] Collected:  06/29/19 0950    Specimen:  Blood Updated:  06/29/19 1100     Extra Tube Hold for add-ons.     Comment: Auto resulted.       [place] Blood Culture Bottle Set [[id number]] Collected:  06/29/19 0950    Specimen:  Blood Updated:  06/29/19 1100     Extra Tube Hold for add-ons.     Comment: Auto resulted.       Valproic Acid Level, Total [[id number]]  (Normal) Collected:  06/29/19 0948    Specimen:  Blood Updated:  06/29/19 1011      Valproic Acid 93.3 mcg/mL     Comprehensive Metabolic Panel [[id number]]  (Abnormal) Collected:  06/29/19 0948    Specimen:  Blood Updated:  06/29/19 1009     Glucose 131 mg/dL      BUN 33 mg/dL      Creatinine 1.37 mg/dL      Sodium 142 mmol/L      Potassium 4.1 mmol/L      Chloride 108 mmol/L      CO2 23.0 mmol/L      Calcium 8.9 mg/dL      Total Protein 6.4 g/dL      Albumin 3.50 g/dL      ALT (SGPT) 173 U/L      AST (SGOT) 68 U/L      Alkaline Phosphatase 51 U/L      Total Bilirubin 0.4 mg/dL      eGFR Non African Amer 54 mL/min/1.73      Globulin 2.9 gm/dL      A/G Ratio 1.2 g/dL      BUN/Creatinine Ratio 24.1     Anion Gap 11.0 mmol/L     Narrative:       GFR Normal >60  Chronic Kidney Disease <60  Kidney Failure <15    CBC & Differential [[id number]] Collected:  06/29/19 0948    Specimen:  Blood Updated:  06/29/19 0958    Narrative:       The following orders were created for panel order CBC & Differential.  Procedure                               Abnormality         Status                     ---------                               -----------         ------                     CBC Auto Differential[[id number]]        Abnormal            Final result                 Please view results for these tests on the individual orders.    CBC Auto Differential [[id number]]  (Abnormal) Collected:  06/29/19 0948    Specimen:  Blood Updated:  06/29/19 0958     WBC 9.59 10*3/mm3      RBC 4.20 10*6/mm3      Hemoglobin 12.0 g/dL      Hematocrit 36.5 %      MCV 86.9 fL      MCH 28.6 pg      MCHC 32.9 g/dL      RDW 16.9 %      RDW-SD 53.3 fl      MPV 9.5 fL      Platelets 308 10*3/mm3      Neutrophil % 62.2 %      Lymphocyte % 28.3 %      Monocyte % 4.8 %      Eosinophil % 0.0 %      Basophil % 0.7 %      Immature Grans % 4.0 %      Neutrophils, Absolute 5.97 10*3/mm3      Lymphocytes, Absolute 2.71 10*3/mm3      Monocytes, Absolute 0.46 10*3/mm3      Eosinophils, Absolute 0.00 10*3/mm3      Basophils, Absolute 0.07 10*3/mm3       Immature Grans, Absolute 0.38 10*3/mm3      nRBC 0.2 /100 WBC     POC Glucose Once [[id number]]  (Abnormal) Collected:  06/29/19 0915    Specimen:  Blood Updated:  06/29/19 0925     Glucose 145 mg/dL      Comment: : [id number] Emmie Coker ID: [id number]             Orders (last 48 hrs)     Start     Ordered    07/01/19 0000  Ammonia  Once      06/30/19 1312    06/30/19 2100  pregabalin (LYRICA) capsule 100 mg  Every 12 Hours Scheduled      06/30/19 1440    06/30/19 2010  POC Glucose Once  Once      06/30/19 1958    06/30/19 1720  POC Glucose Once  Once      06/30/19 1707    06/30/19 1400  divalproex (DEPAKOTE) DR tablet 1,500 mg  Every 8 Hours Scheduled,   Status:  Discontinued      06/29/19 1652    06/30/19 1154  POC Glucose Once  Once      06/30/19 1141    06/30/19 1110  Case Management  Consult  Once     Provider:  (Not yet assigned)    06/30/19 1110    06/30/19 0900  aspirin chewable tablet 81 mg  Daily      06/29/19 1459    06/30/19 0900  venlafaxine XR (EFFEXOR-XR) 24 hr capsule 150 mg  Daily      06/29/19 1459    06/30/19 0838  Diet Regular; Thin  Diet Effective Now     Comments:  Meds whole with thin liquids or applesauce.    06/30/19 0837    06/30/19 0724  POC Glucose Once  Once      06/30/19 0712    06/30/19 0600  Comprehensive Metabolic Panel  Morning Draw      06/29/19 1459    06/30/19 0600  CBC Auto Differential  Morning Draw      06/29/19 1459    06/30/19 0600  Ammonia  Morning Draw      06/29/19 1707    06/29/19 2200  Divalproex Sodium (DEPAKOTE SPRINKLE) capsule 1,500 mg  Every 8 Hours Scheduled      06/29/19 1735    06/29/19 2100  lacosamide (VIMPAT) tablet 150 mg  Every 12 Hours Scheduled      06/29/19 1459    06/29/19 2100  levETIRAcetam (KEPPRA) tablet 1,250 mg  2 Times Daily      06/29/19 1459    06/29/19 2100  famotidine (PEPCID) injection 20 mg  Every 12 Hours Scheduled      06/29/19 1459    06/29/19 2100  QUEtiapine (SEROquel) tablet 50 mg  Nightly      06/29/19 1459     06/29/19 2100  pregabalin (LYRICA) capsule 150 mg  Every 12 Hours Scheduled,   Status:  Discontinued      06/29/19 1459    06/29/19 2100  sodium chloride 0.9 % flush 3 mL  Every 12 Hours Scheduled      06/29/19 1459    06/29/19 2100  temozolomide (TEMODAR) chemo capsule 10 mg  Nightly      06/29/19 1936 06/29/19 2100  temozolomide (TEMODAR) chemo capsule 140 mg  Nightly      06/29/19 1936 06/29/19 1919  POC Glucose Once  Once      06/29/19 1855    06/29/19 1800  dexamethasone (DECADRON) tablet 4 mg  3 Times Daily With Meals      06/29/19 1459    06/29/19 1800  nicotine (NICODERM CQ) 21 MG/24HR patch 1 patch  Every 24 Hours Scheduled      06/29/19 1733    06/29/19 1745  Divalproex Sodium (DEPAKOTE SPRINKLE) capsule 1,500 mg  3 Times Daily,   Status:  Discontinued      06/29/19 1652    06/29/19 1700  POC Glucose Finger 4x Daily AC & at Bedtime  4 Times Daily Before Meals & at Bedtime      06/29/19 1500    06/29/19 1600  Vital Signs  Every 4 Hours      06/29/19 1459    06/29/19 1545  divalproex (DEPAKOTE) DR tablet 1,500 mg  Every 8 Hours Scheduled,   Status:  Discontinued      06/29/19 1459    06/29/19 1545  temozolomide (TEMODAR) chemo capsule 10 mg  Daily,   Status:  Discontinued      06/29/19 1459    06/29/19 1545  sodium chloride 0.9 % infusion  Continuous,   Status:  Discontinued      06/29/19 1459    06/29/19 1511  Case Management  Consult  Once     Provider:  (Not yet assigned)    06/29/19 1510    06/29/19 1459  ondansetron (ZOFRAN) injection 4 mg  Every 6 Hours PRN      06/29/19 1459    06/29/19 1459  bisacodyl (DULCOLAX) suppository 10 mg  Daily PRN      06/29/19 1459    06/29/19 1458  morphine injection 1 mg  Every 4 Hours PRN      06/29/19 1459    06/29/19 1458  naloxone (NARCAN) injection 0.4 mg  Every 5 Minutes PRN      06/29/19 1459    06/29/19 1458  Inpatient Neurology Consult General  Once     Specialty:  Neurology  Provider:  Doreen Zuniga MD    06/29/19 1452     19 1457  Place Sequential Compression Device  Once      19 1459    19 1457  Maintain Sequential Compression Device  Continuous      19 1459    19 1455  Code Status and Medical Interventions:  Continuous      19 1459    19 1455  Intake & Output  Every Shift      19 1459    19 1455  Weigh Patient  Once      19 1459    19 1455  Oxygen Therapy- Nasal Cannula; Titrate for SPO2: 90% - 95%  Continuous      19 1459    19 1455  Insert Peripheral IV  Once      19 1459    19 1455  Saline Lock & Maintain IV Access  Continuous      19 1459    19 1454  sodium chloride 0.9 % flush 3-10 mL  As Needed      19 1459    19 1451  ALPRAZolam (XANAX) tablet 0.25 mg  2 Times Daily PRN      19 1459    19 1424  NPO Diet  Diet Effective Now,   Status:  Canceled     Comments:  Except for necessary meds crushed in applesauce with consistent cues to initiate a timely swallow. If pt alert and requesting PO, family and staff may provide ice chips.    19 1424    19 1424  SLP Plan of Care Cert / Re-Cert  Once     Comments:  Speech Language Pathology Plan of Care  Initial Certification  Certification Period: 2019 - 2019    Patient Name: Lukasz Savage  : 1965    (E16.2) Hypoglycemia  (primary encounter diagnosis)  (R41.82) Altered mental status, unspecified altered mental status type  (D49.6) Brain tumor (CMS/HCC)  (R13.10) Dysphagia, unspecified type                Assessment  SLP Assessment  Prior Level of Function-Communication: cognitive-linguistic impairment, other (see comments)(brain tumor)  Prior Level of Function-Swallowing: no diet consistency restrictions  SLP Swallowing Diagnosis: mild-moderate, oral dysfunction, other (see comments)(r/o pharyngeal dysphagia)  Plan of Care Reviewed With: mother  Swallow Criteria for Skilled Therapeutic Interventions Met: demonstrates skilled  criteria      IP SLP Goals     Row Name 06/29/19 1342             Oral Nutrition/Hydration Goal 1 (SLP)    Oral Nutrition/Hydration Goal 1, SLP  Pt will tolerate least restrictive   diet with no overt s/s of aspiration.   -MB      Time Frame (Oral Nutrition/Hydration Goal 1, SLP)  by discharge  -MB      Barriers (Oral Nutrition/Hydration Goal 1, SLP)  n/a  -MB      Progress/Outcomes (Oral Nutrition/Hydration Goal 1, SLP)  goal ongoing    -MB        User Key  (r) = Recorded By, (t) = Taken By, (c) = Cosigned By    Initials Name Provider Type    Daniela Brock CCC-SLP Speech and Language Pathologist                  Plan    SLP Plan  Therapy Frequency (Swallow): PRN  Predicted Duration Therapy Intervention (Days): until discharge        Daniela Heck CCC-SLP  6/29/2019      By cosigning this order, either electronically or physically, I certify that the therapy services are furnished while this patient is under my care, the services outline above are required by this patient, and will be reviewed every 90 days.        M.D.:__________________________________________ Date: ______________    06/29/19 1424    06/29/19 1336  POC Glucose Once  Once      06/29/19 1323    06/29/19 1236  Inpatient Admission  Once      06/29/19 1235    06/29/19 1218  dextrose 5 % and sodium chloride 0.45 % with KCl 20 mEq/L infusion  Continuous,   Status:  Discontinued      06/29/19 1216    06/29/19 1207  POC Glucose Once  Once      06/29/19 1155    06/29/19 1028  NPO Diet  Diet Effective Now,   Status:  Canceled     Comments:  Should remain in effect until a complete SLP evaluation occurs and a superceding MD order is received.    06/29/19 1027    06/29/19 1028  SLP Consult: Eval & Treat RN Dysphagia Screen Failed  Once      06/29/19 1027    06/29/19 1023  dexamethasone (DECADRON) injection 10 mg  Once      06/29/19 1021    06/29/19 1023  levETIRAcetam in NaCl 0.75% (KEPPRA) IVPB 1,000 mg  Once      06/29/19 1021    06/29/19 0950   [place] Draw  STAT      06/29/19 0950    06/29/19 0950  Green Top (Gel)  PROCEDURE ONCE      06/29/19 0950    06/29/19 0950  [place] Blood Culture Bottle Set  PROCEDURE ONCE      06/29/19 0950    06/29/19 0941  Insert peripheral IV  Once      06/29/19 0940    06/29/19 0941  Valproic Acid Level, Total  Once      06/29/19 0940    06/29/19 0940  sodium chloride 0.9 % flush 10 mL  As Needed      06/29/19 0940    06/29/19 0940  CBC & Differential  Once      06/29/19 0940    06/29/19 0940  Comprehensive Metabolic Panel  Once      06/29/19 0940    06/29/19 0940  CT Head Without Contrast  1 Time Imaging      06/29/19 0940    06/29/19 0940  CBC Auto Differential  PROCEDURE ONCE      06/29/19 0940    06/29/19 0935  ECG 12 Lead  STAT      06/29/19 0934    06/29/19 0926  POC Glucose Once  Once      06/29/19 0915    Unscheduled  Up With Assistance  As Needed      06/29/19 1459    --  sulfamethoxazole-trimethoprim (BACTRIM DS,SEPTRA DS) 800-160 MG per tablet  3 Times Weekly      06/29/19 1611    --  promethazine (PHENERGAN) 12.5 MG tablet  Every 4 Hours PRN      06/29/19 1611        Ventilator/Non-Invasive Ventilation Settings (From admission, onward)    None        Operative/Procedure Notes (all)     No notes of this type exist for this encounter.           Physician Progress Notes (last 72 hours) (Notes from 6/28/2019  7:33 AM through 7/1/2019  7:33 AM)      Doreen Zuniga MD at 6/30/2019  1:39 PM            Neurology Progress Note      Date of admission: 6/29/2019  9:10 AM  Date of visit: 6/30/2019    Chief Complaint:  F/u left sided weakness    Subjective     Subjective:    Patient's mother was giving him Vimpat 100 mg Bid instead of the 150 mg bid.  She sleeps near him but admits she may not have noted if he seized. She had difficulty waking him yesterday morning and could not get him to take his meds and per her the EMS noted blood glucose to be in the 60's.   He went to [facility] last Friday and he was started on  "Lyrica at 150 mg BID and has had hallucinations ever since.  He has been throwing imaginary things or \"talking out of his head\"     He is now back on Vimpat 150 mg BID.He is continuing on Keppra dn Depakote. He is also on Decadron 4 mg Tid.     He still is unable to move his left arm and his left leg is weaker than his right but movement is definitely improving. .       Objective      Vital Signs  Temp:  [97.9 °F (36.6 °C)-98.8 °F (37.1 °C)] 98.2 °F (36.8 °C)  Heart Rate:  [63-82] 82  Resp:  [16-18] 18  BP: (122-151)/(85-98) 147/97    Physical Exam:    HEENT:  Neck supple  CVS:  Regular rate and rhythm.  No murmurs  Carotid Examination:  No bruits  Lungs:  Clear to auscultation  Abdomen:  Non-tender, Non-distended  Extremities:  No signs of peripheral edema    Neurologic Exam:    -Awake, Alert, Oriented to person and place  --Follows simple  commands    Cranial nerves II through XII intact. Pupils react. EOMI  No facial weakness    Motor: (strength out of 5:  1= minimal movement, 2 = movement in plane of gravity, 3 = movement against gravity, 4 = movement against some resistance, 5 = full strength)    -Right Upper Ext: Proximal: 5 Distal: 5  -Left Upper Ext: Proximal:3+ Distal: 0    -Right Lower Ext: Proximal: 5 Distal: 5  -Left Lower Ext: Proximal: 4 Distal:2+    DTR:  3+ throughout on left and 2++ on right extremities    Sensory:  -Intact to light touch  Coordination/Gait:  -No ataxia but weakness contributing to abnormalities seen   Results Review:    I reviewed the patient's new clinical results.      Hospital Problem List      Left hemiparesis (CMS/HCC)    HTN (hypertension)    Seizures (CMS/HCC)    Astrocytoma brain tumor (CMS/HCC)    Anemia    Hypoglycemia    Impression:  1. 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 Seizures on 4 antiepileptics but inadvertent reduction of Vimpat to a previous dose by mother  Per family he had a seizure this morning  3. Hallucinations while on " Lyrica  It seemed to have been started at a fairly high dose.   4. 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  Because it is improving already I suspect this is playing a role and that the episode of hypoglycemia also played a role    Seizures while on 4 anticonvulsants are not likely to be able to fully preventable    Plan:  Reduce Lyrica to 100 mg Bid.  May have to reduce further depending on how he does with confusion and hallucinations.  This may be able to be titrated up. More slowly .  Maintain Vimpat at 150 mg bid as he has bradycardia when Vimpat is higher dose.   Will closely follow      Doreen Parker MD  06/30/19  1:39 PM        Electronically signed by Doreen Zuniga MD at 6/30/2019  2:49 PM     Cristino Encinas DO at 6/30/2019  1:09 PM              [facility] Medicine Services  INPATIENT PROGRESS NOTE    Length of Stay: 1  Date of Admission: 6/29/2019  Primary Care Physician: Linda Hoffman, DNP, APRN    Subjective     Chief Complaint:     Left-sided weakness    HPI     Patient is much more alert today he is interactive and talkative.  His speech is slurred and he has reduced use of his left facial muscles.  His left arm remains flaccid and his left leg is weaker than the right.  The patient's daughter is at bedside and we had a discussion regarding placement and hospice.  She is meeting with the hospice representative today at 4:00.  CBC is unremarkable except for hemoglobin of 13.4.  CMP shows an elevated ALT at 157, AST 54 and a serum ammonia level improved to 37.  Transaminase elevation is likely secondary to a combination of antiepileptic drugs.  I am doubtful that hyperammonemia played a significant role in his presenting symptoms.    Speech therapy has evaluated the patient and advanced him to a regular solid diet with thin liquids.    Review of Systems     All pertinent  negatives and positives are as above. All other systems have been reviewed and are negative unless otherwise stated.     Objective    Temp:  [97.9 °F (36.6 °C)-98.8 °F (37.1 °C)] 98.2 °F (36.8 °C)  Heart Rate:  [63-82] 82  Resp:  [16-18] 18  BP: (122-151)/(85-98) 147/97  Physical Exam    Constitutional: He appears well-developed and well-nourished. He is alert and oriented. He is cooperative. He has a sickly appearance. Moon face.   HENT:   Head: Normocephalic and atraumatic.   Right Ear: External ear normal.   Left Ear: External ear normal.   Nose: Nose normal.   Mouth/Throat: Oropharynx is clear and moist.   Eyes: Conjunctivae are normal.   Neck: Normal range of motion. Neck supple.   Cardiovascular: Normal rate, regular rhythm and normal heart sounds.   No murmur heard.  Pulmonary/Chest: Effort normal and breath sounds normal. No respiratory distress.   Abdominal: Soft. Bowel sounds are normal.    Musculoskeletal: Normal range of motion. He exhibits no edema or tenderness.   Neurological: He exhibits abnormal muscle tone (Flaccid left upper extremity). Coordination abnorma l on left. 3/5 strength left lower extremity.  5/5 strength right upper and lower extremities   Skin: Skin is warm and dry.   Psychiatric: His speech is slurred. He is slowed.         Results Review:  I have reviewed the labs, radiology results, and diagnostic studies since my last progress note and made treatment changes reflective of the results.   I have reviewed the current medications.    Assessment/Plan     Active Hospital Problems    Diagnosis   • **Left hemiparesis (CMS/HCC)   • Hypoglycemia   • Anemia   • Astrocytoma brain tumor (CMS/HCC)   • Seizures (CMS/HCC)   • HTN (hypertension)       PLAN:  Discontinue IV fluids  Recheck ammonia level in a.m.    Cristino Encinas DO   06/30/19   1:10 PM      Electronically signed by Cristino Encinas DO at 6/30/2019  1:24 PM          Consult Notes (last 48 hours) (Notes from 6/29/2019  7:33  AM through 7/1/2019  7:33 AM)      Doreen Zuniga MD at 6/29/2019  4:57 PM      Consult Orders    1. Inpatient Neurology Consult General [[id number]] ordered by Cristino Encinas DO at 06/29/19 1459                    Neurology Consult Note    Patient:  Lukasz Savage   YOB: 1965  MRN:  [id number]  Date of Admission:  6/29/2019  9:10 AM    Date: 6/29/2019    Referring Provider: Cristino Encinas DO  Reason for Consultation: Left partial hemiparesis      History of present illness:     This is a 54 y.o. 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 No report of any seizure activity however,  He was difficult to awaken and EMS found his blood glucose to be 49. He was given amp of D50.       Patient is on 3 AED'S: Vimpat 150 mg BID (became bradycardic on 200 mg BID when he was hospitalized) , Depakote 1500 mg every 8 hours and Keppra records show 1000 mg BID but last hospitalization he was on 1250 mg BID  Valproate level today is 93.3    He has episodes of not being able to move his left arm  He was up and walking a lot yesterday and did not eat.    He already has cognitively recovered and is awake and oriented but still cannot move his left arm    Vital Signs   Temp:  [97.9 °F (36.6 °C)-98.2 °F (36.8 °C)] 98.2 °F (36.8 °C)  Heart Rate:  [59-63] 63  Resp:  [12-16] 16  BP: (135-148)/(85-90) 138/86    General Exam:  Head:  Normal cephalic, atraumatic  HEENT:  Neck supple  Fundoscopic Exam:  No signs of disc edema  CVS:  Regular rate and rhythm.  No murmurs  Carotid Examination:  No bruits  Lungs:  Clear to auscultation  Abdomen:  Non-tender, Non-distended  Extremities:  No signs of peripheral edema  Skin:  No rashes    Neurologic Exam:    Mental Status:    -Awake, Alert, Oriented X 3  -No word finding difficulties  -No  aphasia  -No dysarthria  -Follows simple and complex commands    CN II:  Visual fields full.  Pupils equally reactive to light  CN III, IV, VI:  Extraocular Muscles full with no signs of nystagmus  CN V:  Facial sensory is symmetric   CN VII:  Facial motor symmetric  CN VIII:  Gross hearing intact bilaterally  CN IX/X:  Palate elevates symmetrically  CN XI:  Shoulder shrug symmetric  CN XII:  Tongue is midline on protrusion    Motor: (strength out of 5:  1= minimal movement, 2 = movement in plane of gravity, 3 = movement against gravity, 4 = movement against some resistance, 5 = full strength)    -Right Upper Ext: Proximal: 5 Distal: 5  -Left Upper Ext: 0/5  -Right Lower Ext: Proximal: 5 Distal: 5  -Left Lower Ext: 1 to 2/5  DTR:  -Right   Bicep: 2+ Triceps: 2+ Brachioradialis: 2+   Patella: 2+ Ankle: 2+ Neg Babinski  -Left   Bicep: 2+ Triceps: 2+ Brachioradialis: 2+   Patella: 2+ Ankle: 2+ Neg Babinski    Sensory:  -Intact to light touch, pinprick  Coordination:  -Finger to nose intact on right  -Heel to shin intact on right-No ataxia    Gait  -Not able to walk right now due to weakness of left lower extremity    Impression  1. Left sided weakness  There were no witnessed seizures this time and he has episodes of this that are seemingly unrelated to seizures but always a concern for a Melchor's due to his seizures.  He always got better with steroids. It is possible the hypoglycemic episode is playing a role and could lower seizure threshold but may transiently bring out old deficits.   2. Anaplastic astrocytoma    Patient wanting to go home.        Plan  Fasting ammonia level since he is on Depakote  Will monitor and if no better tomorrow he will need MRI    I discussed the patients findings and my recommendations with patient and family    Doreen Parker MD  06/29/19  4:58 PM      Electronically signed by Doreen Zuniga MD at 6/29/2019  5:42 PM

## 2024-04-06 NOTE — PROGRESS NOTES
Continued Stay Note   Сергей     Patient Name: Lukasz Savage  MRN: [id number]  Today's Date: 6/20/2019    Admit Date: 6/14/2019    Discharge Plan     Row Name 06/20/19 1445       Plan    Final Discharge Disposition Code  01 - home or self-care    Final Note  PT IS BEING DCD HOME TODAY WITH NO DC NEEDS. PT IS AT BASELINE WITH [facility]. HH NOT NEEDED.         Discharge Codes    No documentation.       Expected Discharge Date and Time     Expected Discharge Date Expected Discharge Time    Jun 20, 2019             MAYURI Cotto     response to care/treatments:

## 2024-12-17 NOTE — TELEPHONE ENCOUNTER
Jenny already left a message for the patient letting him know that he needs to be seen.   No care due was identified.  Health [facility] Embedded Care Due Messages. Reference number: [id number].   12/17/2024 2:58:09 PM CST

## (undated) DEVICE — PK CRANI 30

## (undated) DEVICE — DRSNG GZ PETROLTM CURAD 3X9IN STRL

## (undated) DEVICE — CANNULA ARTHSCP L7CM ID575MM CRYS SMOOTH W OBT

## (undated) DEVICE — 2.3MM TAPERED ROUTER

## (undated) DEVICE — TOTAL TRAY, 16FR 10ML SIL FOLEY, URN: Brand: MEDLINE

## (undated) DEVICE — PIN SKULL A/ W/PROTECT CAP PK/3

## (undated) DEVICE — SUT GUT CHRM 3/0 SH 27IN G122H

## (undated) DEVICE — SUTURE ETHLN SZ 3-0 L18IN NONABSORBABLE BLK FS-1 L24MM 3/8 663H

## (undated) DEVICE — Device

## (undated) DEVICE — NDL BIOP 1.8X235MM 5PC

## (undated) DEVICE — SUT SILK 2/0 SH 30IN K833H

## (undated) DEVICE — OCCLUSIVE GAUZE STRIP,3% BISMUTH TRIBROMOPHENATE IN PETROLATUM BLEND: Brand: XEROFORM

## (undated) DEVICE — GLOVE SURG SZ 75 L12IN FNGR THK94MIL TRNSLUC YEL LTX

## (undated) DEVICE — GLV SURG TRIUMPH GREEN W/ALOE PF LTX 8 STRL

## (undated) DEVICE — TRY PREP SCRB VAG PVP

## (undated) DEVICE — SOLUTION IV IRRIG POUR BRL 0.9% SODIUM CHL 2F7124

## (undated) DEVICE — PROXIMATE RH ROTATING HEAD SKIN STAPLERS (35 REGULAR) CONTAINS 35 STAINLESS STEEL STAPLES: Brand: PROXIMATE

## (undated) DEVICE — UTILITY MARKER W/MED LABELS: Brand: MEDLINE

## (undated) DEVICE — PK ENT HD AND NK 30

## (undated) DEVICE — SUT NUROLON 4/0 TF18 CR8 I8IN C584D

## (undated) DEVICE — CLTH CLENS READYCLEANSE PERI CARE PK/5

## (undated) DEVICE — STERILE POLYISOPRENE POWDER-FREE SURGICAL GLOVES: Brand: PROTEXIS

## (undated) DEVICE — SKIN AFFIX SURG ADHESIVE 72/CS 0.55ML: Brand: MEDLINE

## (undated) DEVICE — ANTIBACTERIAL UNDYED BRAIDED (POLYGLACTIN 910), SYNTHETIC ABSORBABLE SUTURE: Brand: COATED VICRYL

## (undated) DEVICE — KIT CG8901 CLIP GUN 10 PK: Brand: CLIP GUN

## (undated) DEVICE — ELECTRD BLD EDGE/INSUL1P 2.4X5.1MM STRL

## (undated) DEVICE — SPHR MARKR LOCATION CI SYS REFL 3PK 30BX

## (undated) DEVICE — DISPOSABLE BIPOLAR CABLE 12FT. (3.6M): Brand: KIRWAN

## (undated) DEVICE — GLV SURG BIOGEL LTX PF 8

## (undated) DEVICE — DISCONTINUED NO SUB IDED TG GLOVE SURG SENSICARE ALOE LT LF PF ST GRN SZ 8

## (undated) DEVICE — SUTURE PDS II SZ 1 L54IN ABSRB VLT L65MM TP-1 1/2 CIR Z879G

## (undated) DEVICE — DERMAHOOK 1/2HOOK PK/6

## (undated) DEVICE — 90-S MAX, SUCTION PROBE, NON-BENDABLE, MAX CUT LEVEL 11: Brand: SERFAS ENERGY

## (undated) DEVICE — SUTURE PDS + SZ 1 L96IN ABSRB VLT L65MM TP-1 1/2 CIR PDP880G

## (undated) DEVICE — DECANTER: Brand: UNBRANDED

## (undated) DEVICE — CATH IV ANGIO FEP 12G 3IN LTBLU 10PK

## (undated) DEVICE — GLOVE SURG SZ 75 L12IN FNGR THK87MIL DK GRN LTX FREE ISOLEX

## (undated) DEVICE — DRSNG TRACH POLYMEM FEN 3.5X3.5IN

## (undated) DEVICE — CANNULA ARTHSCP L7CM DIA7MM TRNSLUC THRD FLX W/ NO SQUIRT

## (undated) DEVICE — PK TURNOVER RM ADV

## (undated) DEVICE — ST TB EXT STANDARDBORE 30IN

## (undated) DEVICE — 6.0MM ACORN

## (undated) DEVICE — FLUID CONTROL SHOULDER PACK: Brand: CONVERTORS

## (undated) DEVICE — SUT VIC 0 MO4 CR8 18IN VCP701D

## (undated) DEVICE — SPNG DISSCT SECTO KTTNER PK/5

## (undated) DEVICE — GLV SURG BIOGEL LTX PF 6 1/2

## (undated) DEVICE — Z INACTIVE USE 2660664 SOLUTION IRRIG 3000ML 0.9% SOD CHL USP UROMATIC PLAS CONT

## (undated) DEVICE — STRAP TRAC ARM TRAPS FOR SHLDR SUSP

## (undated) DEVICE — CONN FLX BREATHE CIRCT

## (undated) DEVICE — Device: Brand: IQ SYSTEM

## (undated) DEVICE — BANDAGE,GAUZE,BULKEE II,4.5"X4.1YD,STRL: Brand: MEDLINE

## (undated) DEVICE — SHOULDER CDS

## (undated) DEVICE — 4.5 MM INCISOR PLUS STRAIGHT                                    BLADES, POWER/EP-1, VIOLET, PACKAGED                                    6 PER BOX, STERILE

## (undated) DEVICE — INTENDED FOR TISSUE SEPARATION, AND OTHER PROCEDURES THAT REQUIRE A SHARP SURGICAL BLADE TO PUNCTURE OR CUT.: Brand: BARD-PARKER ® STAINLESS STEEL BLADES

## (undated) DEVICE — MEDI-VAC NON-CONDUCTIVE SUCTION TUBING 6MM X 6.1M (20 FT.) L: Brand: CARDINAL HEALTH

## (undated) DEVICE — ABDOMINAL PAD: Brand: DERMACEA

## (undated) DEVICE — TUBING PMP IRRIG GOFLO

## (undated) DEVICE — THREE QUARTER SHEET: Brand: CONVERTORS